# Patient Record
Sex: MALE | Race: WHITE | NOT HISPANIC OR LATINO | Employment: OTHER | URBAN - METROPOLITAN AREA
[De-identification: names, ages, dates, MRNs, and addresses within clinical notes are randomized per-mention and may not be internally consistent; named-entity substitution may affect disease eponyms.]

---

## 2020-05-20 ENCOUNTER — OFFICE VISIT (OUTPATIENT)
Dept: URGENT CARE | Facility: CLINIC | Age: 65
End: 2020-05-20
Payer: MEDICARE

## 2020-05-20 VITALS — HEART RATE: 71 BPM | OXYGEN SATURATION: 96 % | TEMPERATURE: 97 F

## 2020-05-20 DIAGNOSIS — Z11.9 ENCOUNTER FOR SCREENING EXAMINATION FOR INFECTIOUS DISEASE: Primary | ICD-10-CM

## 2020-05-20 PROCEDURE — 86769 SARS-COV-2 COVID-19 ANTIBODY: CPT

## 2020-05-20 PROCEDURE — U0003 INFECTIOUS AGENT DETECTION BY NUCLEIC ACID (DNA OR RNA); SEVERE ACUTE RESPIRATORY SYNDROME CORONAVIRUS 2 (SARS-COV-2) (CORONAVIRUS DISEASE [COVID-19]), AMPLIFIED PROBE TECHNIQUE, MAKING USE OF HIGH THROUGHPUT TECHNOLOGIES AS DESCRIBED BY CMS-2020-01-R: HCPCS

## 2020-05-20 PROCEDURE — 99201 PR OFFICE/OUTPT VISIT,NEW,LEVL I: ICD-10-PCS | Mod: S$GLB,,, | Performed by: PHYSICIAN ASSISTANT

## 2020-05-20 PROCEDURE — 99201 PR OFFICE/OUTPT VISIT,NEW,LEVL I: CPT | Mod: S$GLB,,, | Performed by: PHYSICIAN ASSISTANT

## 2020-05-20 RX ORDER — METFORMIN HYDROCHLORIDE 1000 MG/1
1000 TABLET ORAL 2 TIMES DAILY WITH MEALS
Status: ON HOLD | COMMUNITY
Start: 2020-03-09 | End: 2022-01-13 | Stop reason: HOSPADM

## 2020-05-20 RX ORDER — GLIPIZIDE 10 MG/1
TABLET ORAL
Status: ON HOLD | COMMUNITY
Start: 2020-02-29 | End: 2021-01-20 | Stop reason: HOSPADM

## 2020-05-20 RX ORDER — LOVASTATIN 20 MG/1
20 TABLET ORAL NIGHTLY
Status: ON HOLD | COMMUNITY
Start: 2020-03-09 | End: 2022-01-13 | Stop reason: HOSPADM

## 2020-05-20 RX ORDER — INSULIN GLARGINE 100 [IU]/ML
INJECTION, SOLUTION SUBCUTANEOUS
Status: ON HOLD | COMMUNITY
Start: 2020-03-09 | End: 2021-01-20 | Stop reason: SDUPTHER

## 2020-05-20 RX ORDER — LISINOPRIL 20 MG/1
20 TABLET ORAL DAILY
Status: ON HOLD | COMMUNITY
Start: 2020-03-09 | End: 2021-06-09 | Stop reason: HOSPADM

## 2020-05-20 NOTE — PROGRESS NOTES
Subjective:       Patient ID: Tej Purdy is a 64 y.o. male.    Vitals:  temperature is 97.2 °F (36.2 °C). His pulse is 71. His oxygen saturation is 96%.     Chief Complaint: Covid Swab    Patient presents in need of covid swab. Asymptomatic with no complaints    Other   Pertinent negatives include no arthralgias, chest pain, chills, congestion, coughing, fatigue, fever, headaches, joint swelling, myalgias, nausea, rash, sore throat, vertigo or vomiting.       Constitution: Negative for chills, fatigue and fever.   HENT: Negative for congestion and sore throat.    Neck: Negative for painful lymph nodes.   Cardiovascular: Negative for chest pain and leg swelling.   Eyes: Negative for double vision and blurred vision.   Respiratory: Negative for cough and shortness of breath.    Gastrointestinal: Negative for nausea, vomiting and diarrhea.   Genitourinary: Negative for dysuria, frequency and urgency.   Musculoskeletal: Negative for joint pain, joint swelling, muscle cramps and muscle ache.   Skin: Negative for color change, pale and rash.   Allergic/Immunologic: Negative for seasonal allergies.   Neurological: Negative for dizziness, history of vertigo, light-headedness, passing out and headaches.   Hematologic/Lymphatic: Negative for swollen lymph nodes, easy bruising/bleeding and history of blood clots. Does not bruise/bleed easily.   Psychiatric/Behavioral: Negative for nervous/anxious, sleep disturbance and depression. The patient is not nervous/anxious.        Objective:      Physical Exam   Constitutional: He is oriented to person, place, and time. He appears well-developed and well-nourished. No distress.   HENT:   Head: Normocephalic and atraumatic.   Right Ear: External ear normal.   Left Ear: External ear normal.   Eyes: Conjunctivae are normal.   Neck: Normal range of motion. Neck supple.   Cardiovascular: Normal rate.   Pulmonary/Chest: Effort normal. No respiratory distress.   Neurological: He is  alert and oriented to person, place, and time.   Skin: Skin is not diaphoretic.   Psychiatric: He has a normal mood and affect. His behavior is normal. Judgment and thought content normal.         Assessment:       1. Encounter for screening examination for infectious disease        Plan:       Discussed antibody test and meaning. All questions answered    Encounter for screening examination for infectious disease  -     COVID-19 Routine Screening  -     COVID-19 (SARS CoV-2) IgG Antibody    Labs reviewed, pertinent imaging reviewed, previous medical records, medical history, surgical history, social history, family history reviewed.       Patient Instructions   We will call you with results

## 2020-05-22 LAB
SARS-COV-2 IGG SERPLBLD QL IA.RAPID: NEGATIVE
SARS-COV-2 RNA RESP QL NAA+PROBE: NOT DETECTED

## 2020-05-23 ENCOUNTER — TELEPHONE (OUTPATIENT)
Dept: URGENT CARE | Facility: CLINIC | Age: 65
End: 2020-05-23

## 2020-05-23 NOTE — TELEPHONE ENCOUNTER
----- Message from Geo Navarro NP sent at 5/22/2020  4:39 PM CDT -----  Okay to call pt with result: COVID19 swab negative, follow up with pcp if still having symptoms

## 2020-11-20 ENCOUNTER — TELEPHONE (OUTPATIENT)
Dept: TRANSPLANT | Facility: CLINIC | Age: 65
End: 2020-11-20

## 2020-11-20 ENCOUNTER — TELEPHONE (OUTPATIENT)
Dept: HEPATOLOGY | Facility: CLINIC | Age: 65
End: 2020-11-20

## 2020-11-20 DIAGNOSIS — R16.0 LIVER MASS: Primary | ICD-10-CM

## 2020-11-20 NOTE — PROGRESS NOTES
Patient looking for second opinion for work-up of abdominal mass. Have placed referral to hepatology    Brief history:   Symptomatic with abdominal pain. Had ultrasound then follow up CT 10/2020 noted 3.5cm liver lesion and early cirrhosis. Saw Dr Rk Gibbs at [facility] and recommended laproscopic procedure to remove the lesion and small portion of liver. Then met with Dr Devin Burkett with [facility] surgery and reportedly recommended removal of lesion and 50% of liver (after cardiac stress testing). Now asking for second opinion prior to massive liver resection

## 2020-11-20 NOTE — TELEPHONE ENCOUNTER
"Patient: Tej Purdy       MRN: [id number]      : 1955     Age: 65 y.o.  [address] [facility] [address]    Providers  Hepatologists: Thais Maxwell MD  Surgeons: n/a  Radiologists: n/a  Advanced Practice providers:     Priority of review: Transplant related    Patient Transplant Status: Other resection offered at [facility]    Reason for presentation: Initial staging for transplant    Clinical Summary: Pt will be seen on Tuesday  65 yr old- He is Poppy Eh's "uncle-in-law". Noted to have hypervascular liver lesion with washout: 3.5 cm in segment 5/6 c/w HCC. Noted to have cirrhosis. Plt count 133. Atherosclerosis noted on ct scan. Resection offered at [facility].  Tbil 0.6, creat 1.0, albumin 4.7, Na 139, ALT 22  Imaging to be reviewed: 10/22/20 ct scan at [facility]. To be uploaded on Monday    HCC Treatment History: n/a    ABO:     Platelets: No results found for: PLT, EXTPLATELETS  Creatinine: No results found for: CREATININE, EXTCREATININ  Bilirubin: No results found for: BILITOT, EXTBILITOTAL, EXTBILIRUBIN  AFP Last 3 each if available: No results found for: AFP, EXTAFP    MELD: Computed MELD-Na score unavailable. Necessary lab results were not found in the last year.  Computed MELD score unavailable. Necessary lab results were not found in the last year.    Plan:     Follow-up Provider:   "

## 2020-11-23 PROBLEM — K76.6 PORTAL HYPERTENSION: Status: ACTIVE | Noted: 2020-11-23

## 2020-11-23 PROBLEM — N20.0 KIDNEY STONES: Status: ACTIVE | Noted: 2020-11-23

## 2020-11-23 PROBLEM — I10 HTN (HYPERTENSION): Status: ACTIVE | Noted: 2020-11-23

## 2020-11-23 PROBLEM — K74.60 CIRRHOSIS: Status: ACTIVE | Noted: 2020-11-23

## 2020-11-23 PROBLEM — E11.9 DIABETES: Status: ACTIVE | Noted: 2020-11-23

## 2020-11-23 PROBLEM — C44.90 SKIN CANCER: Status: ACTIVE | Noted: 2020-11-23

## 2020-11-23 PROBLEM — R16.0 LIVER MASS: Status: ACTIVE | Noted: 2020-11-23

## 2020-11-23 PROBLEM — K75.81 NASH (NONALCOHOLIC STEATOHEPATITIS): Status: ACTIVE | Noted: 2020-11-23

## 2020-11-23 PROBLEM — E78.5 HYPERLIPIDEMIA: Status: ACTIVE | Noted: 2020-11-23

## 2020-11-23 NOTE — PROGRESS NOTES
HEPATOLOGY CONSULTATION    Referring Physician: Jessika Carbone MD  Current Corresponding Physician: Jessika Carbone MD, Deepika Stauffer MD    Reason for Consultation: Consultation for evaluation of liver mass in the setting of cirrhosis    HPI  The patient is a 65 yr old male with DM, diabetic neuropathy, HTN, hyperlipidemia, who developed abdominal pain. To evaluate this, he underwent a CT scan 10/20 that revealed a 3.5 cm hypervascular mass in segment 5/6. This is suspicious for HCC. The CT scan also revealed cirrhosis of the liver. He has been offered resection at [facility].    He has cirrhosis, compensated presumed to be from CASTILLO. Stopped all alcohol 15 years ago- not a heavy drinker prior to that. The patient denies any symptoms of decompensated cirrhosis, including no ascites or edema, cognitive problems that would suggest hepatic encephalopathy, or GI bleeding from varices.  Plts <133  Tbil 0.6, creat 1.0, albumin 4.7, Na 139, ALT 22.    Radiology review today 11/25/20: 3.5 cm HCC. TACE and ablation suggested for locoregional therapy    Diabetes: neuropathy on gabapentin  Dental: gum disease  Skin cancer: hx of basal cell ca of the hand 20 yrs ago; no evaluation in ~3 yrs  Possible PVD: gets leg cramps with walking short distances    Nicotine addiction: still currently smokes    Social support: has identified his long-term partner, Aniya as social support for liver transplant.      Past Medical History:   Diagnosis Date    Cirrhosis 11/23/2020    Diabetes 11/23/2020    Diabetes mellitus, type 2     Disorder of kidney and ureter     HTN (hypertension) 11/23/2020    Hyperlipidemia 11/23/2020    Kidney stones 11/23/2020    S/p lithotripsy 2020    Liver mass 11/23/2020    CASTILLO (nonalcoholic steatohepatitis) 11/23/2020    Portal hypertension 11/23/2020    Skin cancer 11/23/2020     Outpatient Encounter Medications as of 11/24/2020   Medication Sig Dispense Refill    glipiZIDE (GLUCOTROL) 10 MG tablet        insulin glargine (LANTUS) 100 unit/mL injection       lisinopriL (PRINIVIL,ZESTRIL) 20 MG tablet Take 20 mg by mouth.      lovastatin (MEVACOR) 20 MG tablet Take 20 mg by mouth.      metFORMIN (GLUCOPHAGE) 1000 MG tablet Take 1,000 mg by mouth.       No facility-administered encounter medications on file as of 11/24/2020.      Review of patient's allergies indicates:   Allergen Reactions    Bee pollens Swelling     BEE STINGS swells body     No family history on file.    Social History     Socioeconomic History    Marital status: Single     Spouse name: Not on file    Number of children: Not on file    Years of education: Not on file    Highest education level: Not on file   Occupational History    Not on file   Social Needs    Financial resource strain: Not on file    Food insecurity     Worry: Not on file     Inability: Not on file    Transportation needs     Medical: Not on file     Non-medical: Not on file   Tobacco Use    Smoking status: Never Smoker    Smokeless tobacco: Never Used   Substance and Sexual Activity    Alcohol use: Not Currently    Drug use: Not on file    Sexual activity: Not on file   Lifestyle    Physical activity     Days per week: Not on file     Minutes per session: Not on file    Stress: Not on file   Relationships    Social connections     Talks on phone: Not on file     Gets together: Not on file     Attends Bahai service: Not on file     Active member of club or organization: Not on file     Attends meetings of clubs or organizations: Not on file     Relationship status: Not on file   Other Topics Concern    Not on file   Social History Narrative    Not on file     Review of Systems   Constitutional: Negative.    HENT: Negative.    Eyes: Negative.    Respiratory: Negative.    Cardiovascular: Negative.    Gastrointestinal: Negative.    Genitourinary: Negative.    Musculoskeletal: Negative.    Skin: Negative.    Neurological: Negative.    Psychiatric/Behavioral:  Negative.      Vitals:    11/24/20 0850   BP: (!) 151/66   Pulse: 94       Physical Exam  Vitals signs reviewed.   Constitutional:       Appearance: He is well-developed.   HENT:      Head: Normocephalic and atraumatic.   Eyes:      General: No scleral icterus.     Conjunctiva/sclera: Conjunctivae normal.      Pupils: Pupils are equal, round, and reactive to light.   Neck:      Musculoskeletal: Normal range of motion and neck supple.      Thyroid: No thyromegaly.   Cardiovascular:      Rate and Rhythm: Normal rate and regular rhythm.      Heart sounds: Normal heart sounds.   Pulmonary:      Effort: Pulmonary effort is normal.      Breath sounds: Normal breath sounds. No rales.   Abdominal:      General: Bowel sounds are normal. There is no distension.      Palpations: Abdomen is soft. There is no mass.      Tenderness: There is no abdominal tenderness.   Musculoskeletal: Normal range of motion.   Skin:     General: Skin is warm and dry.      Findings: No rash.   Neurological:      Mental Status: He is alert and oriented to person, place, and time.           Assessment and Plan:  Patient Active Problem List   Diagnosis    Liver mass    Diabetes    Hyperlipidemia    HTN (hypertension)    CASTILLO (nonalcoholic steatohepatitis)    Skin cancer    Cirrhosis    Portal hypertension    Kidney stones     Tej Purdy is a 65 y.o. male with an HCC in the setting of cirrhosis. Current recommendations:  1. HCC: recommend liver tx evaluation; locoregional therapy with TACE/ablation. Discussed resection. Patient has opted for liver transplant  2. Cirrhosis, compensated: check meld labs every 3 months; full serologic w/u for chronic liver disease.  3. Atherosclerosis seen on ct: needs careful assessment for CAD, including cardiology consultation at the time of evaluation.  4. Gum disease: to see a dentist; amoxicillin 1 hr prior to dental procedures  5. Hx of skin cancer, basal cell: dermatology evaluation now  6. Leg  cramping with walking: vascular evaluation during tx evaluation; to see Dr Corbin  7. Atherosclerosis seen on CT: carfeful evaluation for CAD; will do stress test Monday as scheduled at [facility]; to see cardiology  8. Nicotine addiction: patient instructed to stop smoking  9. Diabetes: check hemoglobin A1c    Return 8 weeks

## 2020-11-24 ENCOUNTER — OFFICE VISIT (OUTPATIENT)
Dept: HEPATOLOGY | Facility: CLINIC | Age: 65
End: 2020-11-24
Payer: MEDICARE

## 2020-11-24 ENCOUNTER — TELEPHONE (OUTPATIENT)
Dept: TRANSPLANT | Facility: CLINIC | Age: 65
End: 2020-11-24

## 2020-11-24 ENCOUNTER — CONFERENCE (OUTPATIENT)
Dept: TRANSPLANT | Facility: CLINIC | Age: 65
End: 2020-11-24

## 2020-11-24 VITALS
OXYGEN SATURATION: 99 % | HEART RATE: 94 BPM | SYSTOLIC BLOOD PRESSURE: 151 MMHG | DIASTOLIC BLOOD PRESSURE: 66 MMHG | WEIGHT: 212.31 LBS | HEIGHT: 72 IN | BODY MASS INDEX: 28.76 KG/M2

## 2020-11-24 DIAGNOSIS — K06.9 GUM DISEASE: Primary | ICD-10-CM

## 2020-11-24 DIAGNOSIS — K75.81 NASH (NONALCOHOLIC STEATOHEPATITIS): ICD-10-CM

## 2020-11-24 DIAGNOSIS — R16.0 LIVER MASS: ICD-10-CM

## 2020-11-24 DIAGNOSIS — K76.6 PORTAL HYPERTENSION: ICD-10-CM

## 2020-11-24 DIAGNOSIS — Z01.818 PRE-TRANSPLANT EVALUATION FOR LIVER TRANSPLANT: ICD-10-CM

## 2020-11-24 DIAGNOSIS — C44.90 SKIN CANCER: ICD-10-CM

## 2020-11-24 DIAGNOSIS — E08.610 DIABETES MELLITUS DUE TO UNDERLYING CONDITION WITH DIABETIC NEUROPATHIC ARTHROPATHY: ICD-10-CM

## 2020-11-24 DIAGNOSIS — E78.5 HYPERLIPIDEMIA, UNSPECIFIED HYPERLIPIDEMIA TYPE: ICD-10-CM

## 2020-11-24 DIAGNOSIS — N20.0 KIDNEY STONES: ICD-10-CM

## 2020-11-24 DIAGNOSIS — I10 HYPERTENSION, UNSPECIFIED TYPE: ICD-10-CM

## 2020-11-24 DIAGNOSIS — K74.60 HEPATIC CIRRHOSIS, UNSPECIFIED HEPATIC CIRRHOSIS TYPE, UNSPECIFIED WHETHER ASCITES PRESENT: ICD-10-CM

## 2020-11-24 DIAGNOSIS — C22.0 HCC (HEPATOCELLULAR CARCINOMA): ICD-10-CM

## 2020-11-24 PROBLEM — E11.40 DIABETIC NEUROPATHY: Status: ACTIVE | Noted: 2020-11-24

## 2020-11-24 PROCEDURE — 1101F PR PT FALLS ASSESS DOC 0-1 FALLS W/OUT INJ PAST YR: ICD-10-PCS | Mod: CPTII,S$GLB,, | Performed by: INTERNAL MEDICINE

## 2020-11-24 PROCEDURE — 1126F AMNT PAIN NOTED NONE PRSNT: CPT | Mod: S$GLB,,, | Performed by: INTERNAL MEDICINE

## 2020-11-24 PROCEDURE — 3288F FALL RISK ASSESSMENT DOCD: CPT | Mod: CPTII,S$GLB,, | Performed by: INTERNAL MEDICINE

## 2020-11-24 PROCEDURE — 99999 PR PBB SHADOW E&M-EST. PATIENT-LVL V: ICD-10-PCS | Mod: PBBFAC,,, | Performed by: INTERNAL MEDICINE

## 2020-11-24 PROCEDURE — 1159F MED LIST DOCD IN RCRD: CPT | Mod: S$GLB,,, | Performed by: INTERNAL MEDICINE

## 2020-11-24 PROCEDURE — 3008F BODY MASS INDEX DOCD: CPT | Mod: CPTII,S$GLB,, | Performed by: INTERNAL MEDICINE

## 2020-11-24 PROCEDURE — 1126F PR PAIN SEVERITY QUANTIFIED, NO PAIN PRESENT: ICD-10-PCS | Mod: S$GLB,,, | Performed by: INTERNAL MEDICINE

## 2020-11-24 PROCEDURE — 1101F PT FALLS ASSESS-DOCD LE1/YR: CPT | Mod: CPTII,S$GLB,, | Performed by: INTERNAL MEDICINE

## 2020-11-24 PROCEDURE — 1159F PR MEDICATION LIST DOCUMENTED IN MEDICAL RECORD: ICD-10-PCS | Mod: S$GLB,,, | Performed by: INTERNAL MEDICINE

## 2020-11-24 PROCEDURE — 99999 PR PBB SHADOW E&M-EST. PATIENT-LVL V: CPT | Mod: PBBFAC,,, | Performed by: INTERNAL MEDICINE

## 2020-11-24 PROCEDURE — 3288F PR FALLS RISK ASSESSMENT DOCUMENTED: ICD-10-PCS | Mod: CPTII,S$GLB,, | Performed by: INTERNAL MEDICINE

## 2020-11-24 PROCEDURE — 99205 OFFICE O/P NEW HI 60 MIN: CPT | Mod: S$GLB,,, | Performed by: INTERNAL MEDICINE

## 2020-11-24 PROCEDURE — 99205 PR OFFICE/OUTPT VISIT, NEW, LEVL V, 60-74 MIN: ICD-10-PCS | Mod: S$GLB,,, | Performed by: INTERNAL MEDICINE

## 2020-11-24 PROCEDURE — 3008F PR BODY MASS INDEX (BMI) DOCUMENTED: ICD-10-PCS | Mod: CPTII,S$GLB,, | Performed by: INTERNAL MEDICINE

## 2020-11-24 RX ORDER — AMOXICILLIN 500 MG/1
2000 CAPSULE ORAL
Qty: 4 CAPSULE | Refills: 2 | Status: SHIPPED | OUTPATIENT
Start: 2020-11-24 | End: 2021-01-15 | Stop reason: CLARIF

## 2020-11-24 RX ORDER — PANTOPRAZOLE SODIUM 20 MG/1
20 TABLET, DELAYED RELEASE ORAL DAILY
COMMUNITY
End: 2021-09-13 | Stop reason: SDUPTHER

## 2020-11-24 RX ORDER — GABAPENTIN 300 MG/1
300 CAPSULE ORAL 3 TIMES DAILY
COMMUNITY
End: 2021-02-17 | Stop reason: SDUPTHER

## 2020-11-24 RX ORDER — AMLODIPINE BESYLATE 5 MG/1
5 TABLET ORAL DAILY
Status: ON HOLD | COMMUNITY
End: 2021-06-09 | Stop reason: HOSPADM

## 2020-11-24 NOTE — PATIENT INSTRUCTIONS
1. Peripheral vascular disease w/u during transplant evaluation  2. Amoxicillin 1 hour prior to dental procedures  3. Liver transplant evaluation  4. IR consult for locoregional therapy  5. Dermatology evaluation  6. Proceed with stress test as scheduled  7. Magnesium gummies for constipation  Return 8 weeks    [id number]

## 2020-11-24 NOTE — TELEPHONE ENCOUNTER
Referral received from Dr Maxwell.     Patient with CASTILLO with HCC.   ICD-10:  k74.60  Referred for liver transplant for  EVALUATION.    Referral completed and forwarded to [facility].          Insurance: EPIC

## 2020-11-24 NOTE — LETTER
November 24, 2020      Deepika Stauffer MD  [address] [facility] [address]           [facility] - Hepatology  [address] [address]  Phone: [phone]  Fax: [phone]          Patient: Tej Purdy   MR Number: [id number]   YOB: 1955   Date of Visit: 11/24/2020       Dear Dr. Deepika Stauffer:    Thank you for referring Tej Purdy to me for evaluation. Attached you will find relevant portions of my assessment and plan of care.    If you have questions, please do not hesitate to call me. I look forward to following Tej Purdy along with you.    Sincerely,    Thais Maxwell MD    Enclosure  CC:  No Recipients    If you would like to receive this communication electronically, please contact [email or web address] or [phone] to request more information on [employer] access.    For providers and/or their staff who would like to refer a patient to [facility], please contact us through our one-stop-shop provider referral line, [facility], at [phone].    If you feel you have received this communication in error or would no longer like to receive these types of communications, please e-mail [email or web address]

## 2020-11-25 NOTE — TELEPHONE ENCOUNTER
"Patient: Tej Purdy       MRN: [id number]      : 1955     Age: 65 y.o.  [address] [facility] [address]    Providers  Hepatologists: Thais Maxwell MD  Surgeons: n/a  Radiologists: n/a  Advanced Practice providers:     Priority of review: Transplant related    Patient Transplant Status: Other resection offered at [facility]    Reason for presentation: Initial staging for transplant    Clinical Summary: Pt will be seen on Tuesday  65 yr old- He is Poppy Eh's "uncle-in-law". Noted to have hypervascular liver lesion with washout: 3.5 cm in segment 5/6 c/w HCC. Noted to have cirrhosis. Plt count 133. Atherosclerosis noted on ct scan. Resection offered at [facility].  Tbil 0.6, creat 1.0, albumin 4.7, Na 139, ALT 22  Imaging to be reviewed: 10/22/20 ct scan at [facility]. To be uploaded on Monday    HCC Treatment History: n/a    ABO:     Platelets: No results found for: PLT, EXTPLATELETS  Creatinine: No results found for: CREATININE, EXTCREATININ  Bilirubin: No results found for: BILITOT, EXTBILITOTAL, EXTBILIRUBIN  AFP Last 3 each if available: No results found for: AFP, EXTAFP    MELD: Computed MELD-Na score unavailable. Necessary lab results were not found in the last year.  Computed MELD score unavailable. Necessary lab results were not found in the last year.    Plan: 3.5cm mass in seg VI with arterial enhancement and PV washout as well as pseudocapsule c/w HCC.  Within [facility].  If transplant candidate, would favor TACE/MWA for bridging.       Committee Discussion: Nodular liver; cirrhotic appearance. 3.5 cm enhancing lesion with washout in segment 6, c/w HCC. No significant evidence of portal HTN. Consider MD dominguez to discuss with patient. IR consult for TACE/Ablate.     Follow-up Provider: Thais Maxwell MD  "

## 2020-12-01 ENCOUNTER — OFFICE VISIT (OUTPATIENT)
Dept: INTERVENTIONAL RADIOLOGY/VASCULAR | Facility: CLINIC | Age: 65
End: 2020-12-01
Payer: MEDICARE

## 2020-12-01 VITALS
HEART RATE: 89 BPM | WEIGHT: 212.75 LBS | BODY MASS INDEX: 28.82 KG/M2 | HEIGHT: 72 IN | SYSTOLIC BLOOD PRESSURE: 140 MMHG | DIASTOLIC BLOOD PRESSURE: 73 MMHG

## 2020-12-01 DIAGNOSIS — C22.0 HCC (HEPATOCELLULAR CARCINOMA): Primary | ICD-10-CM

## 2020-12-01 DIAGNOSIS — Z01.812 PRE-PROCEDURE LAB EXAM: ICD-10-CM

## 2020-12-01 PROCEDURE — 99204 PR OFFICE/OUTPT VISIT, NEW, LEVL IV, 45-59 MIN: ICD-10-PCS | Mod: NTX,S$GLB,, | Performed by: FAMILY MEDICINE

## 2020-12-01 PROCEDURE — 1159F PR MEDICATION LIST DOCUMENTED IN MEDICAL RECORD: ICD-10-PCS | Mod: NTX,S$GLB,, | Performed by: FAMILY MEDICINE

## 2020-12-01 PROCEDURE — 99999 PR PBB SHADOW E&M-EST. PATIENT-LVL III: ICD-10-PCS | Mod: PBBFAC,TXP,, | Performed by: FAMILY MEDICINE

## 2020-12-01 PROCEDURE — 99999 PR PBB SHADOW E&M-EST. PATIENT-LVL III: CPT | Mod: PBBFAC,TXP,, | Performed by: FAMILY MEDICINE

## 2020-12-01 PROCEDURE — 3008F PR BODY MASS INDEX (BMI) DOCUMENTED: ICD-10-PCS | Mod: CPTII,NTX,S$GLB, | Performed by: FAMILY MEDICINE

## 2020-12-01 PROCEDURE — 1159F MED LIST DOCD IN RCRD: CPT | Mod: NTX,S$GLB,, | Performed by: FAMILY MEDICINE

## 2020-12-01 PROCEDURE — 3008F BODY MASS INDEX DOCD: CPT | Mod: CPTII,NTX,S$GLB, | Performed by: FAMILY MEDICINE

## 2020-12-01 PROCEDURE — 99204 OFFICE O/P NEW MOD 45 MIN: CPT | Mod: NTX,S$GLB,, | Performed by: FAMILY MEDICINE

## 2020-12-01 RX ORDER — ASPIRIN 81 MG/1
81 TABLET ORAL DAILY
Status: ON HOLD | COMMUNITY
End: 2024-01-30 | Stop reason: HOSPADM

## 2020-12-01 RX ORDER — AMOXICILLIN 500 MG
1 CAPSULE ORAL DAILY
COMMUNITY

## 2020-12-01 NOTE — LETTER
December 1, 2020    Tej Purdy  [address] [facility]  [place] [address]     [facility] 9th Fl  [address]  [facility] [address]  Phone: [phone] PRE-PROCEDURE INSTRUCTIONS    Your procedure with Interventional Radiology is scheduled for 12/29/2020 and 12/30/2020. Please arrive by 7:00am. Please note your appointment time in [facility] will be different.    You must check-in and receive a wristband before going to your procedure. Your check-in location is Laboratory then Day of Surgery Center on second floor.    DO NOT take aspirin for 5 days before your procedure.  DO NOT take fish oil for 5 days before your procedure.    **Do not eat or drink anything between midnight and the time of your procedure. This includes gum, mints, and candy lemon drops.    **Do not smoke or drink alcoholic beverages 24 hours prior to your procedure.    **If you wear contact lenses, dentures, hearing aids, or glasses, bring a container to put them in during the procedure and give them to a family member for safekeeping.    **If you have been diagnosed with sleep apnea please bring your CPAP machine.    **If your doctor has scheduled you for an overnight stay, bring a small overnight bag with any personal items that you may need.    **Make arrangements in advance for transportation home by a responsible adult. It is not safe to drive a vehicle during the 24 hours following the procedure.    **All [facility] facilities and properties are tobacco free. Smoking is NOT allowed.    PLEASE NOTE: The procedure schedule has many variables which affect the time of your procedure. Family members should be available if your surgery time changes.    If you have any questions about these instructions call Interventional Radiology at [phone] Monday - Friday between 8:00am and 4:00pm or [phone] (ask for interventional radiology resident) for after hours.

## 2020-12-01 NOTE — PROGRESS NOTES
Subjective:       Patient ID: Tej Purdy is a 65 y.o. male.    Chief Complaint: Hepatocellular Carcinoma    Patient referred to Interventional Radiology by Dr. Maxwell to discuss treatment of hepatocellular carcinoma. He has a history of CASTILLO cirrhosis. He tells me approximately 6 months ago he began to have intense nausea that would cause him to have hot and cold flashes. He tells me this would happen every 1-2 weeks. He also has complaints of constipation. Workup revealed a 3.5 cm hypervascular mass in segment 5/6. He endorses relief from the nausea and hot and cold flashes since starting pantoprazole. He continues to have complaints of constipation. He is accompanied by his significant other. He was reviewed in liver conference.     Review of Systems   Constitutional: Positive for appetite change (decreased), chills and fatigue. Negative for activity change and fever.   HENT: Negative for nasal congestion, drooling, ear discharge, postnasal drip, sneezing and trouble swallowing.    Eyes: Negative for pain, discharge, redness and itching.        Wears glasses   Respiratory: Negative for cough, shortness of breath, wheezing and stridor.    Cardiovascular: Negative for chest pain, palpitations and leg swelling.   Gastrointestinal: Positive for abdominal distention, constipation and diarrhea. Negative for abdominal pain, nausea and vomiting.   Genitourinary: Negative for difficulty urinating, dysuria, frequency and urgency.   Musculoskeletal: Negative for arthralgias, back pain, gait problem, joint swelling, myalgias and neck pain.        Legs after walking 1 block start to hurt  Hx of neuropathy   Integumentary:  Negative for color change, pallor and rash.   Neurological: Positive for weakness (with decreased appetite). Negative for dizziness and headaches.         Objective:      Physical Exam  Vitals signs reviewed.   Constitutional:       General: He is not in acute distress.     Appearance: He is  well-developed. He is not diaphoretic.   HENT:      Head: Normocephalic and atraumatic.      Right Ear: External ear normal.      Left Ear: External ear normal.      Nose: Nose normal.      Mouth/Throat:      Pharynx: No oropharyngeal exudate.   Eyes:      General: No scleral icterus.        Right eye: No discharge.         Left eye: No discharge.      Conjunctiva/sclera: Conjunctivae normal.      Pupils: Pupils are equal, round, and reactive to light.   Neck:      Musculoskeletal: Neck supple.      Thyroid: No thyromegaly.      Vascular: No JVD.      Trachea: No tracheal deviation.   Cardiovascular:      Rate and Rhythm: Normal rate and regular rhythm.      Heart sounds: Murmur present. No friction rub. No gallop.    Pulmonary:      Effort: Pulmonary effort is normal. No respiratory distress.      Breath sounds: Normal breath sounds. No stridor. No wheezing or rales.   Abdominal:      General: Bowel sounds are normal. There is no distension.      Palpations: Abdomen is soft. There is no mass.      Tenderness: There is no abdominal tenderness. There is no guarding or rebound.   Lymphadenopathy:      Cervical: No cervical adenopathy.   Skin:     General: Skin is warm and dry.      Nails: There is no clubbing.     Neurological:      Mental Status: He is alert and oriented to person, place, and time.      Gait: Gait normal.       CT 10/22/2020       Assessment:       1. HCC (hepatocellular carcinoma)    2. Pre-procedure lab exam        Plan:         Explained to patient recommendation is to treat with a combination of both chemoembolization and microwave ablation. Chemoembolization procedure discussed in detail with the patient including risks (including, but not limited to, pain, bleeding, infection, damage to nearby structures, and the need for additional procedures), benefits, potential complications, usual pre and post procedure course, as well as potential to develop post-embolization syndrome. Utilized images  from the patient's chart, the internet, and illustrations to help explain procedure.     Discussed how the microwave abltion will be performed, risks (including, but not limited to, pain, bleeding, infection, damage to nearby structures, and the need for additional procedures), benefits, possible complications, pre-post procedure expectations, and alternatives. Explained plan would be to treat with chemoembolization then treat with microwave ablation the next day. Explained 1 month post procedure would need to obtain MRI and labs. Patient tells me he has extreme anxiety with MRI. Will obtain CT scan instead. The patient voices understanding and all questions have been answered.  The patient agrees to proceed as planned. Patient scheduled for TACE on 12/29/2020. Patient scheduled for COVID screen on 12/27/2020. Pre-procedure handout with clinic phone number provided.

## 2020-12-01 NOTE — Clinical Note
"Thank you for referring Mr. Purdy to Interventional Radiology at the [facility]. Please don't hesitate to contact us if there are any questions regarding this evaluation at [phone]. If you have any other patients for whom you would like a consultation, please place an order for "[id number]", and we will be happy to review their case.    Sincerely,  NATE Montesinos, FNP  Interventional Radiology"

## 2020-12-05 NOTE — PROGRESS NOTES
Orders entered and appointment card completed for follow-up clinic appointment with labs the week of December 7th.    Will proceed with scheduling evaluation for liver transplant, including additional consults/testing, as directed by MD

## 2020-12-07 ENCOUNTER — TELEPHONE (OUTPATIENT)
Dept: TRANSPLANT | Facility: CLINIC | Age: 65
End: 2020-12-07

## 2020-12-07 DIAGNOSIS — C44.90 SKIN CANCER: ICD-10-CM

## 2020-12-07 DIAGNOSIS — Z01.818 PRE-TRANSPLANT EVALUATION FOR LIVER TRANSPLANT: ICD-10-CM

## 2020-12-07 DIAGNOSIS — C22.0 HEPATOCELLULAR CARCINOMA: ICD-10-CM

## 2020-12-07 DIAGNOSIS — K76.6 PORTAL HYPERTENSION: Primary | ICD-10-CM

## 2020-12-07 DIAGNOSIS — Z12.5 ENCOUNTER FOR SCREENING FOR MALIGNANT NEOPLASM OF PROSTATE: ICD-10-CM

## 2020-12-07 DIAGNOSIS — E11.40 TYPE 2 DIABETES MELLITUS WITH DIABETIC NEUROPATHY, UNSPECIFIED WHETHER LONG TERM INSULIN USE: ICD-10-CM

## 2020-12-07 DIAGNOSIS — K74.60 HEPATIC CIRRHOSIS, UNSPECIFIED HEPATIC CIRRHOSIS TYPE, UNSPECIFIED WHETHER ASCITES PRESENT: ICD-10-CM

## 2020-12-07 DIAGNOSIS — K75.81 NASH (NONALCOHOLIC STEATOHEPATITIS): ICD-10-CM

## 2020-12-08 NOTE — PROGRESS NOTES
HEPATOLOGY FOLLOW UP    Referring Physician: Jessika Carbone MD  Current Corresponding Physician: Jessika Carbone MD    Tej Purdy is here for follow up of HCC    HPI  Since Tej NARCISO Gurwinder's last visit I was able to review his films in radiology conference: 3.5 cm hypervascular mass in segment 5/6. Combination of TACE and RFA was recommended and is scheduled for the end of the month. At the same time a liver tx evaluation was recommended. His insurance has been authorized for liver transplant evaluation.    He continues to deny any symptoms of decompensated cirrhosis, including no ascites or edema, cognitive problems that would suggest hepatic encephalopathy, or GI bleeding from varices.    Stress test at [facility]: negative for ischemia  2D echo reveals moderate aortic stenosis    He saw the dermatologist today: BCC to R hand, excised over 20 years ago; L groin lesion- underwent shave biopsy today    Outpatient Encounter Medications as of 12/9/2020   Medication Sig Dispense Refill    amLODIPine (NORVASC) 5 MG tablet Take 5 mg by mouth once daily. Take 5 mg by mouth once daily.      amoxicillin (AMOXIL) 500 MG capsule Take 4 capsules (2,000 mg total) by mouth On call Procedure (take one hour before the dental procedure). 4 capsule 2    aspirin (ECOTRIN) 81 MG EC tablet Take 81 mg by mouth once daily.      gabapentin (NEURONTIN) 300 MG capsule Take 300 mg by mouth 3 (three) times daily.      glipiZIDE (GLUCOTROL) 10 MG tablet       insulin glargine (LANTUS) 100 unit/mL injection       lisinopriL (PRINIVIL,ZESTRIL) 20 MG tablet Take 20 mg by mouth.      lovastatin (MEVACOR) 20 MG tablet Take 20 mg by mouth.      metFORMIN (GLUCOPHAGE) 1000 MG tablet Take 1,000 mg by mouth.      omega-3 fatty acids/fish oil (FISH OIL-OMEGA-3 FATTY ACIDS) 300-1,000 mg capsule Take by mouth once daily.      pantoprazole (PROTONIX) 20 MG tablet Take 20 mg by mouth once daily.       No facility-administered encounter medications  on file as of 12/9/2020.      Review of patient's allergies indicates:   Allergen Reactions    Bee pollens Swelling     BEE STINGS swells body     Past Medical History:   Diagnosis Date    Cirrhosis 11/23/2020    Diabetes mellitus, type 2     Diabetic neuropathy 11/24/2020    Disorder of kidney and ureter     HTN (hypertension) 11/23/2020    Hyperlipidemia 11/23/2020    Kidney stones 11/23/2020    S/p lithotripsy 2020    Liver mass 11/23/2020    CASTILLO (nonalcoholic steatohepatitis) 11/23/2020    Portal hypertension 11/23/2020    Skin cancer 11/23/2020       Review of Systems   Constitutional: Negative.    HENT: Negative.    Eyes: Negative.    Respiratory: Negative.    Cardiovascular: Negative.    Gastrointestinal: Negative.    Genitourinary: Negative.    Musculoskeletal: Negative.    Skin: Negative.    Neurological: Negative.    Psychiatric/Behavioral: Negative.      Vitals:    12/09/20 1437   BP: (!) 119/56   Pulse: 82       Physical Exam  Vitals signs reviewed.   Constitutional:       Appearance: He is well-developed.   HENT:      Head: Normocephalic and atraumatic.   Eyes:      General: No scleral icterus.     Conjunctiva/sclera: Conjunctivae normal.      Pupils: Pupils are equal, round, and reactive to light.   Neck:      Musculoskeletal: Normal range of motion and neck supple.      Thyroid: No thyromegaly.   Cardiovascular:      Rate and Rhythm: Normal rate and regular rhythm.      Heart sounds: Normal heart sounds.   Pulmonary:      Effort: Pulmonary effort is normal.      Breath sounds: Normal breath sounds. No rales.   Abdominal:      General: Bowel sounds are normal. There is no distension.      Palpations: Abdomen is soft. There is no mass.      Tenderness: There is no abdominal tenderness.   Musculoskeletal: Normal range of motion.   Skin:     General: Skin is warm and dry.      Findings: No rash.   Neurological:      Mental Status: He is alert and oriented to person, place, and time.          MELD-Na score: 6 at 12/9/2020  9:05 AM  MELD score: 6 at 12/9/2020  9:05 AM  Calculated from:  Serum Creatinine: 0.8 mg/dL (Rounded to 1 mg/dL) at 12/9/2020  9:05 AM  Serum Sodium: 139 mmol/L (Rounded to 137 mmol/L) at 12/9/2020  9:05 AM  Total Bilirubin: 0.6 mg/dL (Rounded to 1 mg/dL) at 12/9/2020  9:05 AM  INR(ratio): 1.0 at 12/9/2020  9:05 AM  Age: 65 years 1 month    No results found for: GLU, BUN, CREATININE, CALCIUM, NA, K, CL, PROT, CO2, ANIONGAP, WBC, RBC, HGB, HCT, MCV, MCH, MCHC  No results found for: RDW, PLT, MPV, GRAN, LYMPH, MONO, EOSINOPHIL, BASOPHIL, EOS, BASO, APTT, GROUPTRH, EZEKIEL, BNP, CHOL, TRIG, HDL, CHOLHDL, TOTALCHOLEST, ALBUMIN, BILIDIR, AST, ALT, ALKPHOS, MG, LABPROT, INR, PSA, QUANTIFERON    Assessment and Plan:  Patient Active Problem List   Diagnosis    Liver mass    Diabetes    Hyperlipidemia    HTN (hypertension)    CASTILLO (nonalcoholic steatohepatitis)    Skin cancer    Cirrhosis    Portal hypertension    Kidney stone    Diabetic neuropathy     Tej Purdy is a 65 y.o. male with compensated cirrhosis and HCC. Current recommendations:  1. HCC: TACE and RFA; complete liver tx evaluation.  2. Compensated cirrhosis: full serologic w/u; monitor meld labs monthly after TACE and RFA x 3 months  3. Atherosclerosis seen on ct: needs careful assessment for CAD, including cardiology consultation at the time of evaluation; negative stress test reassuting  4. Aortic stenosis, moderate- 2D echo now and to see Dr Shelton of cardiology  5. Gum disease: to see a dentist; amoxicillin 1 hr prior to dental procedures  6. Hx of skin cancer, basal cell-remote-cleared for tx; await shave biopsy of left inguinal lesion.  7. Leg cramping with walking: vascular evaluation during tx evaluation; to see Dr Corbni  8. THC addiction: patient instructed to stop smoking THC  9. Diabetes: check hemoglobin A1c- consider coming off metformin  10. Depression- situational given new dx of HCC and need  for liver tx- referral to oncology psychology at [facility] (tx coordinator to arrange)    Return 2 weeks after IR procedures

## 2020-12-08 NOTE — TELEPHONE ENCOUNTER
Spoke with Marcus Persauddelfina in reference to coordinating dates for liver transplant evaluation.  Standard and Fast Pass evaluation procedures reviewed, as well as tentative start dates.  Patient expressed understanding and agreed to Fast Pass evaluation Jan 14th and 15th.   Tentative evaluation schedule reviewed. Patient states recent c-scope done at [facility]; will request results. Denies having prior egd and request to schedule at [facility]. Advised of the need to fast after midnight Jan 13th and need to have caregiver attend.  All questions and concerns addressed. Caregiver and Fast Pass appointment letters mailed.

## 2020-12-09 ENCOUNTER — OFFICE VISIT (OUTPATIENT)
Dept: TRANSPLANT | Facility: CLINIC | Age: 65
End: 2020-12-09
Payer: MEDICARE

## 2020-12-09 ENCOUNTER — LAB VISIT (OUTPATIENT)
Dept: LAB | Facility: HOSPITAL | Age: 65
End: 2020-12-09
Attending: INTERNAL MEDICINE
Payer: MEDICARE

## 2020-12-09 ENCOUNTER — OFFICE VISIT (OUTPATIENT)
Dept: DERMATOLOGY | Facility: CLINIC | Age: 65
End: 2020-12-09
Payer: MEDICARE

## 2020-12-09 VITALS
OXYGEN SATURATION: 97 % | HEART RATE: 82 BPM | SYSTOLIC BLOOD PRESSURE: 119 MMHG | DIASTOLIC BLOOD PRESSURE: 56 MMHG | BODY MASS INDEX: 28.7 KG/M2 | WEIGHT: 211.88 LBS | HEIGHT: 72 IN

## 2020-12-09 DIAGNOSIS — N20.0 KIDNEY STONES: ICD-10-CM

## 2020-12-09 DIAGNOSIS — K75.81 NASH (NONALCOHOLIC STEATOHEPATITIS): ICD-10-CM

## 2020-12-09 DIAGNOSIS — E11.40 TYPE 2 DIABETES MELLITUS WITH DIABETIC NEUROPATHY, UNSPECIFIED WHETHER LONG TERM INSULIN USE: ICD-10-CM

## 2020-12-09 DIAGNOSIS — L82.1 SEBORRHEIC KERATOSES: ICD-10-CM

## 2020-12-09 DIAGNOSIS — K06.9 GUM DISEASE: ICD-10-CM

## 2020-12-09 DIAGNOSIS — Z01.818 PRE-TRANSPLANT EVALUATION FOR LIVER TRANSPLANT: ICD-10-CM

## 2020-12-09 DIAGNOSIS — K74.60 HEPATIC CIRRHOSIS, UNSPECIFIED HEPATIC CIRRHOSIS TYPE, UNSPECIFIED WHETHER ASCITES PRESENT: Primary | ICD-10-CM

## 2020-12-09 DIAGNOSIS — K74.60 HEPATIC CIRRHOSIS, UNSPECIFIED HEPATIC CIRRHOSIS TYPE, UNSPECIFIED WHETHER ASCITES PRESENT: ICD-10-CM

## 2020-12-09 DIAGNOSIS — I10 HYPERTENSION, UNSPECIFIED TYPE: ICD-10-CM

## 2020-12-09 DIAGNOSIS — E08.610 DIABETES MELLITUS DUE TO UNDERLYING CONDITION WITH DIABETIC NEUROPATHIC ARTHROPATHY: ICD-10-CM

## 2020-12-09 DIAGNOSIS — K76.6 PORTAL HYPERTENSION: ICD-10-CM

## 2020-12-09 DIAGNOSIS — E78.5 HYPERLIPIDEMIA, UNSPECIFIED HYPERLIPIDEMIA TYPE: ICD-10-CM

## 2020-12-09 DIAGNOSIS — C22.0 HCC (HEPATOCELLULAR CARCINOMA): ICD-10-CM

## 2020-12-09 DIAGNOSIS — C44.90 SKIN CANCER: ICD-10-CM

## 2020-12-09 DIAGNOSIS — C22.0 HEPATOCELLULAR CARCINOMA: ICD-10-CM

## 2020-12-09 DIAGNOSIS — R16.0 LIVER MASS: ICD-10-CM

## 2020-12-09 DIAGNOSIS — E13.65 OTHER SPECIFIED DIABETES MELLITUS WITH HYPERGLYCEMIA: ICD-10-CM

## 2020-12-09 DIAGNOSIS — Z12.83 SCREENING EXAM FOR SKIN CANCER: ICD-10-CM

## 2020-12-09 DIAGNOSIS — D48.9 NEOPLASM, UNCERTAIN WHETHER BENIGN OR MALIGNANT: Primary | ICD-10-CM

## 2020-12-09 DIAGNOSIS — B35.6 TINEA CRURIS: ICD-10-CM

## 2020-12-09 LAB
AFP SERPL-MCNC: 2.8 NG/ML (ref 0–8.4)
AFP SERPL-MCNC: 2.8 NG/ML (ref 0–8.4)
ALBUMIN SERPL BCP-MCNC: 4.4 G/DL (ref 3.5–5.2)
ALP SERPL-CCNC: 74 U/L (ref 55–135)
ALT SERPL W/O P-5'-P-CCNC: 34 U/L (ref 10–44)
ANION GAP SERPL CALC-SCNC: 12 MMOL/L (ref 8–16)
AST SERPL-CCNC: 29 U/L (ref 10–40)
BASOPHILS # BLD AUTO: 0.07 K/UL (ref 0–0.2)
BASOPHILS NFR BLD: 0.6 % (ref 0–1.9)
BILIRUB SERPL-MCNC: 0.6 MG/DL (ref 0.1–1)
BUN SERPL-MCNC: 24 MG/DL (ref 8–23)
CALCIUM SERPL-MCNC: 9.9 MG/DL (ref 8.7–10.5)
CHLORIDE SERPL-SCNC: 103 MMOL/L (ref 95–110)
CO2 SERPL-SCNC: 24 MMOL/L (ref 23–29)
CREAT SERPL-MCNC: 0.8 MG/DL (ref 0.5–1.4)
DIFFERENTIAL METHOD: ABNORMAL
EOSINOPHIL # BLD AUTO: 0.2 K/UL (ref 0–0.5)
EOSINOPHIL NFR BLD: 1.5 % (ref 0–8)
ERYTHROCYTE [DISTWIDTH] IN BLOOD BY AUTOMATED COUNT: 13.2 % (ref 11.5–14.5)
EST. GFR  (AFRICAN AMERICAN): >60 ML/MIN/1.73 M^2
EST. GFR  (NON AFRICAN AMERICAN): >60 ML/MIN/1.73 M^2
ESTIMATED AVG GLUCOSE: 117 MG/DL (ref 68–131)
GLUCOSE SERPL-MCNC: 113 MG/DL (ref 70–110)
HBA1C MFR BLD HPLC: 5.7 % (ref 4–5.6)
HCT VFR BLD AUTO: 49.9 % (ref 40–54)
HGB BLD-MCNC: 16.2 G/DL (ref 14–18)
IMM GRANULOCYTES # BLD AUTO: 0.1 K/UL (ref 0–0.04)
IMM GRANULOCYTES NFR BLD AUTO: 0.8 % (ref 0–0.5)
INR PPP: 1 (ref 0.8–1.2)
LYMPHOCYTES # BLD AUTO: 1.9 K/UL (ref 1–4.8)
LYMPHOCYTES NFR BLD: 16.3 % (ref 18–48)
MCH RBC QN AUTO: 29.8 PG (ref 27–31)
MCHC RBC AUTO-ENTMCNC: 32.5 G/DL (ref 32–36)
MCV RBC AUTO: 92 FL (ref 82–98)
MONOCYTES # BLD AUTO: 1 K/UL (ref 0.3–1)
MONOCYTES NFR BLD: 8.1 % (ref 4–15)
NEUTROPHILS # BLD AUTO: 8.7 K/UL (ref 1.8–7.7)
NEUTROPHILS NFR BLD: 72.7 % (ref 38–73)
NRBC BLD-RTO: 0 /100 WBC
PLATELET # BLD AUTO: 161 K/UL (ref 150–350)
PMV BLD AUTO: 11.6 FL (ref 9.2–12.9)
POTASSIUM SERPL-SCNC: 4.1 MMOL/L (ref 3.5–5.1)
PROT SERPL-MCNC: 7.9 G/DL (ref 6–8.4)
PROTHROMBIN TIME: 11 SEC (ref 9–12.5)
RBC # BLD AUTO: 5.44 M/UL (ref 4.6–6.2)
SODIUM SERPL-SCNC: 139 MMOL/L (ref 136–145)
WBC # BLD AUTO: 11.91 K/UL (ref 3.9–12.7)

## 2020-12-09 PROCEDURE — 88305 TISSUE EXAM BY PATHOLOGIST: CPT | Mod: 26,NTX,, | Performed by: PATHOLOGY

## 2020-12-09 PROCEDURE — 3008F BODY MASS INDEX DOCD: CPT | Mod: CPTII,S$GLB,TXP, | Performed by: INTERNAL MEDICINE

## 2020-12-09 PROCEDURE — 99203 PR OFFICE/OUTPT VISIT, NEW, LEVL III, 30-44 MIN: ICD-10-PCS | Mod: 25,S$GLB,, | Performed by: DERMATOLOGY

## 2020-12-09 PROCEDURE — 3288F PR FALLS RISK ASSESSMENT DOCUMENTED: ICD-10-PCS | Mod: CPTII,S$GLB,TXP, | Performed by: INTERNAL MEDICINE

## 2020-12-09 PROCEDURE — 1159F MED LIST DOCD IN RCRD: CPT | Mod: S$GLB,TXP,, | Performed by: INTERNAL MEDICINE

## 2020-12-09 PROCEDURE — 99999 PR PBB SHADOW E&M-EST. PATIENT-LVL II: ICD-10-PCS | Mod: PBBFAC,,,

## 2020-12-09 PROCEDURE — 99999 PR PBB SHADOW E&M-EST. PATIENT-LVL IV: ICD-10-PCS | Mod: PBBFAC,TXP,, | Performed by: INTERNAL MEDICINE

## 2020-12-09 PROCEDURE — 99215 OFFICE O/P EST HI 40 MIN: CPT | Mod: S$GLB,TXP,, | Performed by: INTERNAL MEDICINE

## 2020-12-09 PROCEDURE — 88305 TISSUE EXAM BY PATHOLOGIST: ICD-10-PCS | Mod: 26,NTX,, | Performed by: PATHOLOGY

## 2020-12-09 PROCEDURE — 1125F AMNT PAIN NOTED PAIN PRSNT: CPT | Mod: S$GLB,TXP,, | Performed by: INTERNAL MEDICINE

## 2020-12-09 PROCEDURE — 3288F FALL RISK ASSESSMENT DOCD: CPT | Mod: CPTII,S$GLB,TXP, | Performed by: INTERNAL MEDICINE

## 2020-12-09 PROCEDURE — 83036 HEMOGLOBIN GLYCOSYLATED A1C: CPT | Mod: NTX

## 2020-12-09 PROCEDURE — 3044F HG A1C LEVEL LT 7.0%: CPT | Mod: CPTII,S$GLB,TXP, | Performed by: INTERNAL MEDICINE

## 2020-12-09 PROCEDURE — 99215 PR OFFICE/OUTPT VISIT, EST, LEVL V, 40-54 MIN: ICD-10-PCS | Mod: S$GLB,TXP,, | Performed by: INTERNAL MEDICINE

## 2020-12-09 PROCEDURE — 11102 TANGNTL BX SKIN SINGLE LES: CPT | Mod: GC,S$GLB,, | Performed by: STUDENT IN AN ORGANIZED HEALTH CARE EDUCATION/TRAINING PROGRAM

## 2020-12-09 PROCEDURE — 36415 COLL VENOUS BLD VENIPUNCTURE: CPT | Mod: NTX

## 2020-12-09 PROCEDURE — 99203 OFFICE O/P NEW LOW 30 MIN: CPT | Mod: 25,S$GLB,, | Performed by: DERMATOLOGY

## 2020-12-09 PROCEDURE — 1101F PT FALLS ASSESS-DOCD LE1/YR: CPT | Mod: CPTII,S$GLB,TXP, | Performed by: INTERNAL MEDICINE

## 2020-12-09 PROCEDURE — 85610 PROTHROMBIN TIME: CPT | Mod: TXP

## 2020-12-09 PROCEDURE — 1159F PR MEDICATION LIST DOCUMENTED IN MEDICAL RECORD: ICD-10-PCS | Mod: S$GLB,,, | Performed by: DERMATOLOGY

## 2020-12-09 PROCEDURE — 80321 ALCOHOLS BIOMARKERS 1OR 2: CPT | Mod: NTX

## 2020-12-09 PROCEDURE — 1101F PR PT FALLS ASSESS DOC 0-1 FALLS W/OUT INJ PAST YR: ICD-10-PCS | Mod: CPTII,S$GLB,TXP, | Performed by: INTERNAL MEDICINE

## 2020-12-09 PROCEDURE — 99999 PR PBB SHADOW E&M-EST. PATIENT-LVL II: CPT | Mod: PBBFAC,,,

## 2020-12-09 PROCEDURE — 80053 COMPREHEN METABOLIC PANEL: CPT | Mod: TXP

## 2020-12-09 PROCEDURE — 1159F MED LIST DOCD IN RCRD: CPT | Mod: S$GLB,,, | Performed by: DERMATOLOGY

## 2020-12-09 PROCEDURE — 82105 ALPHA-FETOPROTEIN SERUM: CPT | Mod: TXP

## 2020-12-09 PROCEDURE — 3044F PR MOST RECENT HEMOGLOBIN A1C LEVEL <7.0%: ICD-10-PCS | Mod: CPTII,S$GLB,TXP, | Performed by: INTERNAL MEDICINE

## 2020-12-09 PROCEDURE — 3008F PR BODY MASS INDEX (BMI) DOCUMENTED: ICD-10-PCS | Mod: CPTII,S$GLB,TXP, | Performed by: INTERNAL MEDICINE

## 2020-12-09 PROCEDURE — 1159F PR MEDICATION LIST DOCUMENTED IN MEDICAL RECORD: ICD-10-PCS | Mod: S$GLB,TXP,, | Performed by: INTERNAL MEDICINE

## 2020-12-09 PROCEDURE — 99999 PR PBB SHADOW E&M-EST. PATIENT-LVL IV: CPT | Mod: PBBFAC,TXP,, | Performed by: INTERNAL MEDICINE

## 2020-12-09 PROCEDURE — 11102 PR TANGENTIAL BIOPSY, SKIN, SINGLE LESION: ICD-10-PCS | Mod: GC,S$GLB,, | Performed by: STUDENT IN AN ORGANIZED HEALTH CARE EDUCATION/TRAINING PROGRAM

## 2020-12-09 PROCEDURE — 1125F PR PAIN SEVERITY QUANTIFIED, PAIN PRESENT: ICD-10-PCS | Mod: S$GLB,TXP,, | Performed by: INTERNAL MEDICINE

## 2020-12-09 PROCEDURE — 85025 COMPLETE CBC W/AUTO DIFF WBC: CPT | Mod: TXP

## 2020-12-09 PROCEDURE — 88305 TISSUE EXAM BY PATHOLOGIST: CPT | Mod: NTX | Performed by: PATHOLOGY

## 2020-12-09 RX ORDER — SERTRALINE HYDROCHLORIDE 25 MG/1
25 TABLET, FILM COATED ORAL DAILY
Status: ON HOLD | COMMUNITY
Start: 2020-12-01 | End: 2022-01-13 | Stop reason: SDUPTHER

## 2020-12-09 RX ORDER — KETOCONAZOLE 20 MG/G
CREAM TOPICAL
Qty: 60 G | Refills: 6 | Status: ON HOLD | OUTPATIENT
Start: 2020-12-09 | End: 2021-01-20 | Stop reason: HOSPADM

## 2020-12-09 NOTE — PROGRESS NOTES
Subjective:       Patient ID:  Tej Purdy is a 65 y.o. male who presents for   Chief Complaint   Patient presents with    Skin Check     HPI  64 y/o M with h/o hepatocellular carcinoma and NMSC presenting for FBSE. Pt his a h/o BCC to R hand, excised over 20 years ago. Pt reports he has seen a dermatologist in the last year but has not had a FBSE since his skin cancer was diagnosed. Today he reports a lesion on his left groin that has been present for several years. Has gotten darker and enlarged. No other growing, changing, or nonhealing lesions. Noncompliant with daily SPF or sun protective clothing.     Review of Systems   Constitutional: Negative.    HENT: Negative.    Respiratory: Negative.    Gastrointestinal: Negative.    Skin: Negative for itching, rash, daily sunscreen use, activity-related sunscreen use and wears hat.   Allergic/Immunologic: Negative.         Objective:    Physical Exam   Constitutional: He appears well-developed and well-nourished.   Neurological: He is alert and oriented to person, place, and time.   Skin:   Areas Examined (abnormalities noted in diagram):   Scalp / Hair Palpated and Inspected  Head / Face Inspection Performed  Neck Inspection Performed  Chest / Axilla Inspection Performed  Abdomen Inspection Performed  Genitals / Buttocks / Groin Inspection Performed  Back Inspection Performed  RUE Inspected  LUE Inspection Performed  RLE Inspected  LLE Inspection Performed  Nails and Digits Inspection Performed              Diagram Legend     Erythematous scaling macule/papule c/w actinic keratosis       Vascular papule c/w angioma      Pigmented verrucoid papule/plaque c/w seborrheic keratosis      Yellow umbilicated papule c/w sebaceous hyperplasia      Irregularly shaped tan macule c/w lentigo     1-2 mm smooth white papules consistent with Milia      Movable subcutaneous cyst with punctum c/w epidermal inclusion cyst      Subcutaneous movable cyst c/w pilar cyst      Firm  pink to brown papule c/w dermatofibroma      Pedunculated fleshy papule(s) c/w skin tag(s)      Evenly pigmented macule c/w junctional nevus     Mildly variegated pigmented, slightly irregular-bordered macule c/w mildly atypical nevus      Flesh colored to evenly pigmented papule c/w intradermal nevus       Pink pearly papule/plaque c/w basal cell carcinoma      Erythematous hyperkeratotic cursted plaque c/w SCC      Surgical scar with no sign of skin cancer recurrence      Open and closed comedones      Inflammatory papules and pustules      Verrucoid papule consistent consistent with wart     Erythematous eczematous patches and plaques     Dystrophic onycholytic nail with subungual debris c/w onychomycosis     Umbilicated papule    Erythematous-base heme-crusted tan verrucoid plaque consistent with inflamed seborrheic keratosis     Erythematous Silvery Scaling Plaque c/w Psoriasis     See annotation          Assessment / Plan:      Pathology Orders:     Normal Orders This Visit    Specimen to Pathology, Dermatology     Comments:    Number of Specimens:->1  ------------------------->-------------------------  Spec 1 Procedure:->Biopsy  Spec 1 Clinical Impression:->ISK vs MM vs other  Spec 1 Source:->L inguinal fold    Questions:    Procedure Type: Dermatology and skin neoplasms    Number of Specimens: 1    ------------------------: -------------------------    Spec 1 Procedure: Biopsy    Spec 1 Clinical Impression: ISK vs MM vs other    Spec 1 Source: L inguinal fold    Clinical Information: 64 y/o M with h/o dark brown plaque to L groin, gradually enlarging and changing in color        Neoplasm uncertain behavior, left inguinal fold  -ISK vs MN vs MM  -after verbal consent obtained, shave biopsy performed  -wound care discussed with patient   Shave biopsy procedure note:    Shave biopsy performed after verbal consent including risk of infection, scar, recurrence, need for additional treatment of site. Area prepped  with alcohol, anesthetized with approximately 1.0cc of 1% lidocaine with epinephrine. Lesional tissue shaved with razor blade. Hemostasis achieved with application of aluminum chloride. No complications. Dressing applied. Wound care explained.    SK, scalp and R inguinal fold  -provided reassurance  If right scalp SKs    History of NMSC   -counseled on sun protective measures  -continue yearly FBSE   -RTC sooner if concerning lesions develop    Tinea cruris  Ketoconazole cream bid x 4 weeks    HCC -  Pre transplant  Pt will require yearly skin exams         Follow up in about 1 year (around 12/9/2021).

## 2020-12-09 NOTE — PATIENT INSTRUCTIONS
Shave Biopsy Wound Care    Your doctor has performed a shave biopsy today.  A band aid and vaseline ointment has been placed over the site.  This should remain in place for 24 hours.  It is recommended that you keep the area dry for the first 24 hours.  After 24 hours, you may remove the band aid and wash the area with warm soap and water and apply Vaseline jelly.  Many patients prefer to use Neosporin or Bacitracin ointment.  This is acceptable; however, know that you can develop an allergy to this medication even if you have used it safely for years.  It is important to keep the area moist.  Letting it dry out and get air slows healing time, and will worsen the scar.  Band aid is optional after first 24 hours.      If you notice increasing redness, tenderness, pain, or yellow drainage at the biopsy site, please notify your doctor.  These are signs of an infection.    If your biopsy site is bleeding, apply firm pressure for 15 minutes straight.  Repeat for another 15 minutes, if it is still bleeding.   If the surgical site continues to bleed, then please contact your doctor.      For [facility] users:   You will receive your biopsy results in [facility] as soon as they are available. Please be assured that your physician/provider will review your results and will then determine what further treatment, evaluation, or planning is required. You should be contacted by your physician's/provider's office within 5 business days of receiving your results; If not, please reach out to directly. This is one more way [facility] is putting you first.     [address] [facility] [place] [address]/ [phone] FAX/ [email or web address]

## 2020-12-09 NOTE — PATIENT INSTRUCTIONS
1. Moderate aortic stenosis- repeat 2D echo and to see Dr Shelton  2.liver transplant evaluation  3. Referral to oncology psychologist at [facility]    Return 2 weeks after procedures

## 2020-12-09 NOTE — LETTER
December 9, 2020        Thais Maxwell  [address]  [facility] [address]  Phone: [phone]  Fax: [phone]             [facility]  [address]""[address] [address] [address]  Phone: [phone]  Fax: [phone]   Patient: Tej Purdy   MR Number: [id number]   YOB: 1955   Date of Visit: 12/9/2020       Dear Dr. Thais Maxwell    Thank you for referring Tej Purdy to me for evaluation. Attached you will find relevant portions of my assessment and plan of care.    If you have questions, please do not hesitate to call me. I look forward to following Tej Purdy along with you.    Sincerely,    Thais Maxwell MD    Enclosure    If you would like to receive this communication electronically, please contact [email or web address] or [phone] to request [employer] access.    [employer] is a tool which provides read-only access to select patient information with whom you have a relationship. Its easy to use and provides real time access to review your patients record including encounter summaries, notes, results, and demographic information.    If you feel you have received this communication in error or would no longer like to receive these types of communications, please e-mail [email or web address]

## 2020-12-09 NOTE — Clinical Note
1. Moderate aortic stenosis- repeat 2D echo and to see Dr Shelton  2.liver transplant evaluation  3. Referral to oncology psychologist at [facility]    Return 2 weeks after procedures

## 2020-12-11 DIAGNOSIS — I73.9 PAD (PERIPHERAL ARTERY DISEASE): Primary | ICD-10-CM

## 2020-12-11 LAB
FINAL PATHOLOGIC DIAGNOSIS: NORMAL
GROSS: NORMAL
MICROSCOPIC EXAM: NORMAL

## 2020-12-14 ENCOUNTER — TELEPHONE (OUTPATIENT)
Dept: DERMATOLOGY | Facility: CLINIC | Age: 65
End: 2020-12-14

## 2020-12-14 NOTE — TELEPHONE ENCOUNTER
Called patient with biopsy results below. Patient understands benign nature of lesion. No further treatment needed.    Skin, left inguinal fold, shave biopsy:   -SEBORRHEIC KERATOSIS, PIGMENTED

## 2020-12-16 LAB — PHOSPHATIDYLETHANOL (PETH): NEGATIVE NG/ML

## 2020-12-17 ENCOUNTER — INITIAL CONSULT (OUTPATIENT)
Dept: VASCULAR SURGERY | Facility: CLINIC | Age: 65
End: 2020-12-17
Attending: SURGERY
Payer: MEDICARE

## 2020-12-17 ENCOUNTER — HOSPITAL ENCOUNTER (OUTPATIENT)
Dept: VASCULAR SURGERY | Facility: CLINIC | Age: 65
Discharge: HOME | End: 2020-12-17
Attending: SURGERY
Payer: MEDICARE

## 2020-12-17 VITALS
SYSTOLIC BLOOD PRESSURE: 133 MMHG | DIASTOLIC BLOOD PRESSURE: 61 MMHG | BODY MASS INDEX: 29.56 KG/M2 | HEIGHT: 72 IN | HEART RATE: 65 BPM | WEIGHT: 218.25 LBS | TEMPERATURE: 99 F

## 2020-12-17 DIAGNOSIS — I73.9 PAD (PERIPHERAL ARTERY DISEASE): ICD-10-CM

## 2020-12-17 DIAGNOSIS — C22.0 HEPATOCELLULAR CARCINOMA: ICD-10-CM

## 2020-12-17 DIAGNOSIS — K75.81 NASH (NONALCOHOLIC STEATOHEPATITIS): ICD-10-CM

## 2020-12-17 DIAGNOSIS — K76.6 PORTAL HYPERTENSION: ICD-10-CM

## 2020-12-17 DIAGNOSIS — E11.40 TYPE 2 DIABETES MELLITUS WITH DIABETIC NEUROPATHY, UNSPECIFIED WHETHER LONG TERM INSULIN USE: ICD-10-CM

## 2020-12-17 DIAGNOSIS — I73.9 CLAUDICATION: ICD-10-CM

## 2020-12-17 DIAGNOSIS — I73.9 PAD (PERIPHERAL ARTERY DISEASE): Primary | ICD-10-CM

## 2020-12-17 DIAGNOSIS — K74.60 HEPATIC CIRRHOSIS, UNSPECIFIED HEPATIC CIRRHOSIS TYPE, UNSPECIFIED WHETHER ASCITES PRESENT: ICD-10-CM

## 2020-12-17 DIAGNOSIS — Z01.818 PRE-TRANSPLANT EVALUATION FOR LIVER TRANSPLANT: ICD-10-CM

## 2020-12-17 DIAGNOSIS — C44.90 SKIN CANCER: ICD-10-CM

## 2020-12-17 PROCEDURE — 93923 UPR/LXTR ART STDY 3+ LVLS: CPT | Mod: S$GLB,TXP,, | Performed by: SURGERY

## 2020-12-17 PROCEDURE — 99999 PR PBB SHADOW E&M-EST. PATIENT-LVL IV: CPT | Mod: PBBFAC,TXP,, | Performed by: SURGERY

## 2020-12-17 PROCEDURE — 3044F PR MOST RECENT HEMOGLOBIN A1C LEVEL <7.0%: ICD-10-PCS | Mod: CPTII,S$GLB,TXP, | Performed by: SURGERY

## 2020-12-17 PROCEDURE — 99204 PR OFFICE/OUTPT VISIT, NEW, LEVL IV, 45-59 MIN: ICD-10-PCS | Mod: S$GLB,TXP,, | Performed by: SURGERY

## 2020-12-17 PROCEDURE — 3044F HG A1C LEVEL LT 7.0%: CPT | Mod: CPTII,S$GLB,TXP, | Performed by: SURGERY

## 2020-12-17 PROCEDURE — 93923 PR NON-INVASIVE PHYSIOLOGIC STUDY EXTREMITY 3 LEVELS: ICD-10-PCS | Mod: S$GLB,TXP,, | Performed by: SURGERY

## 2020-12-17 PROCEDURE — 1159F PR MEDICATION LIST DOCUMENTED IN MEDICAL RECORD: ICD-10-PCS | Mod: S$GLB,TXP,, | Performed by: SURGERY

## 2020-12-17 PROCEDURE — 99999 PR PBB SHADOW E&M-EST. PATIENT-LVL IV: ICD-10-PCS | Mod: PBBFAC,TXP,, | Performed by: SURGERY

## 2020-12-17 PROCEDURE — 99204 OFFICE O/P NEW MOD 45 MIN: CPT | Mod: S$GLB,TXP,, | Performed by: SURGERY

## 2020-12-17 PROCEDURE — 1159F MED LIST DOCD IN RCRD: CPT | Mod: S$GLB,TXP,, | Performed by: SURGERY

## 2020-12-27 ENCOUNTER — LAB VISIT (OUTPATIENT)
Dept: URGENT CARE | Facility: CLINIC | Age: 65
End: 2020-12-27
Payer: MEDICARE

## 2020-12-27 DIAGNOSIS — Z01.812 PRE-PROCEDURE LAB EXAM: ICD-10-CM

## 2020-12-27 PROCEDURE — 99000 SPECIMEN HANDLING OFFICE-LAB: CPT | Mod: S$GLB,TXP,, | Performed by: FAMILY MEDICINE

## 2020-12-27 PROCEDURE — U0003 INFECTIOUS AGENT DETECTION BY NUCLEIC ACID (DNA OR RNA); SEVERE ACUTE RESPIRATORY SYNDROME CORONAVIRUS 2 (SARS-COV-2) (CORONAVIRUS DISEASE [COVID-19]), AMPLIFIED PROBE TECHNIQUE, MAKING USE OF HIGH THROUGHPUT TECHNOLOGIES AS DESCRIBED BY CMS-2020-01-R: HCPCS | Mod: TXP

## 2020-12-27 PROCEDURE — 99000 PR SPECIMEN HANDLING,DR OFF->LAB: ICD-10-PCS | Mod: S$GLB,TXP,, | Performed by: FAMILY MEDICINE

## 2020-12-28 DIAGNOSIS — C22.0 HCC (HEPATOCELLULAR CARCINOMA): Primary | ICD-10-CM

## 2020-12-28 LAB — SARS-COV-2 RNA RESP QL NAA+PROBE: NOT DETECTED

## 2020-12-28 RX ORDER — MIDAZOLAM HYDROCHLORIDE 1 MG/ML
1 INJECTION INTRAMUSCULAR; INTRAVENOUS
Status: CANCELLED | OUTPATIENT
Start: 2020-12-28

## 2020-12-28 RX ORDER — HEPARIN SODIUM 200 [USP'U]/100ML
1000 INJECTION, SOLUTION INTRAVENOUS CONTINUOUS
Status: CANCELLED | OUTPATIENT
Start: 2020-12-28

## 2020-12-28 RX ORDER — FENTANYL CITRATE 50 UG/ML
50 INJECTION, SOLUTION INTRAMUSCULAR; INTRAVENOUS
Status: CANCELLED | OUTPATIENT
Start: 2020-12-28

## 2020-12-29 ENCOUNTER — HOSPITAL ENCOUNTER (OUTPATIENT)
Dept: INTERVENTIONAL RADIOLOGY/VASCULAR | Facility: HOSPITAL | Age: 65
Discharge: HOME | End: 2020-12-30
Attending: STUDENT IN AN ORGANIZED HEALTH CARE EDUCATION/TRAINING PROGRAM | Admitting: STUDENT IN AN ORGANIZED HEALTH CARE EDUCATION/TRAINING PROGRAM
Payer: MEDICARE

## 2020-12-29 ENCOUNTER — RESEARCH ENCOUNTER (OUTPATIENT)
Dept: RESEARCH | Facility: HOSPITAL | Age: 65
End: 2020-12-29

## 2020-12-29 DIAGNOSIS — C22.0 HCC (HEPATOCELLULAR CARCINOMA): ICD-10-CM

## 2020-12-29 DIAGNOSIS — C22.0 HCC (HEPATOCELLULAR CARCINOMA): Primary | ICD-10-CM

## 2020-12-29 LAB
POCT GLUCOSE: 115 MG/DL (ref 70–110)
POCT GLUCOSE: 153 MG/DL (ref 70–110)

## 2020-12-29 PROCEDURE — 76380 CAT SCAN FOLLOW-UP STUDY: CPT | Mod: 26,59,NTX, | Performed by: STUDENT IN AN ORGANIZED HEALTH CARE EDUCATION/TRAINING PROGRAM

## 2020-12-29 PROCEDURE — 25000003 PHARM REV CODE 250: Mod: NTX | Performed by: STUDENT IN AN ORGANIZED HEALTH CARE EDUCATION/TRAINING PROGRAM

## 2020-12-29 PROCEDURE — 37243 VASC EMBOLIZE/OCCLUDE ORGAN: CPT | Mod: NTX,,, | Performed by: STUDENT IN AN ORGANIZED HEALTH CARE EDUCATION/TRAINING PROGRAM

## 2020-12-29 PROCEDURE — 99152 MOD SED SAME PHYS/QHP 5/>YRS: CPT | Mod: NTX

## 2020-12-29 PROCEDURE — 63600175 PHARM REV CODE 636 W HCPCS: Mod: NTX | Performed by: PHYSICIAN ASSISTANT

## 2020-12-29 PROCEDURE — 75774 ARTERY X-RAY EACH VESSEL: CPT | Mod: 26,59,NTX, | Performed by: STUDENT IN AN ORGANIZED HEALTH CARE EDUCATION/TRAINING PROGRAM

## 2020-12-29 PROCEDURE — 36248 INS CATH ABD/L-EXT ART ADDL: CPT | Mod: NTX

## 2020-12-29 PROCEDURE — 25000003 PHARM REV CODE 250: Mod: NTX | Performed by: PHYSICIAN ASSISTANT

## 2020-12-29 PROCEDURE — 25000003 PHARM REV CODE 250: Mod: TXP | Performed by: PHYSICIAN ASSISTANT

## 2020-12-29 PROCEDURE — 37243 IR EMBOLIZATION COMP FOR TUMOR_ORGAN ISCHEMIA_INFARC: ICD-10-PCS | Mod: NTX,,, | Performed by: STUDENT IN AN ORGANIZED HEALTH CARE EDUCATION/TRAINING PROGRAM

## 2020-12-29 PROCEDURE — 36247 INS CATH ABD/L-EXT ART 3RD: CPT | Mod: NTX

## 2020-12-29 PROCEDURE — G0378 HOSPITAL OBSERVATION PER HR: HCPCS | Mod: NTX

## 2020-12-29 PROCEDURE — C1894 INTRO/SHEATH, NON-LASER: HCPCS | Mod: NTX

## 2020-12-29 PROCEDURE — 36247 INS CATH ABD/L-EXT ART 3RD: CPT | Mod: 51,NTX,, | Performed by: STUDENT IN AN ORGANIZED HEALTH CARE EDUCATION/TRAINING PROGRAM

## 2020-12-29 PROCEDURE — 36248 INS CATH ABD/L-EXT ART ADDL: CPT | Mod: NTX,,, | Performed by: STUDENT IN AN ORGANIZED HEALTH CARE EDUCATION/TRAINING PROGRAM

## 2020-12-29 PROCEDURE — 96420 CHEMO IA PUSH TECNIQUE: CPT | Mod: TXP

## 2020-12-29 PROCEDURE — 11000001 HC ACUTE MED/SURG PRIVATE ROOM: Mod: NTX

## 2020-12-29 PROCEDURE — 76380 CAT SCAN FOLLOW-UP STUDY: CPT | Mod: TC,NTX,59

## 2020-12-29 PROCEDURE — 75726 ARTERY X-RAYS ABDOMEN: CPT | Mod: 26,59,NTX, | Performed by: STUDENT IN AN ORGANIZED HEALTH CARE EDUCATION/TRAINING PROGRAM

## 2020-12-29 PROCEDURE — 36247 PR PLACE CATH SUBSUBSELECT ART,ABD/PEL: ICD-10-PCS | Mod: 51,NTX,, | Performed by: STUDENT IN AN ORGANIZED HEALTH CARE EDUCATION/TRAINING PROGRAM

## 2020-12-29 PROCEDURE — G0269 OCCLUSIVE DEVICE IN VEIN ART: HCPCS | Mod: NTX

## 2020-12-29 PROCEDURE — 75887 PR  PERCUT XHEPATIC PORTOGRAM: ICD-10-PCS | Mod: 26,NTX,, | Performed by: STUDENT IN AN ORGANIZED HEALTH CARE EDUCATION/TRAINING PROGRAM

## 2020-12-29 PROCEDURE — 77263 PR  RADIATION THERAPY PLAN COMPLEX: ICD-10-PCS | Mod: NTX,,, | Performed by: STUDENT IN AN ORGANIZED HEALTH CARE EDUCATION/TRAINING PROGRAM

## 2020-12-29 PROCEDURE — 77263 THER RADIOLOGY TX PLNG CPLX: CPT | Mod: NTX,,, | Performed by: STUDENT IN AN ORGANIZED HEALTH CARE EDUCATION/TRAINING PROGRAM

## 2020-12-29 PROCEDURE — 76377 PR  3D RENDERING W/ IMAGE POSTPROCESS: ICD-10-PCS | Mod: 26,NTX,, | Performed by: STUDENT IN AN ORGANIZED HEALTH CARE EDUCATION/TRAINING PROGRAM

## 2020-12-29 PROCEDURE — G0379 DIRECT REFER HOSPITAL OBSERV: HCPCS | Mod: TXP

## 2020-12-29 PROCEDURE — 76377 3D RENDER W/INTRP POSTPROCES: CPT | Mod: TC,TXP

## 2020-12-29 PROCEDURE — 76377 3D RENDER W/INTRP POSTPROCES: CPT | Mod: 26,NTX,, | Performed by: STUDENT IN AN ORGANIZED HEALTH CARE EDUCATION/TRAINING PROGRAM

## 2020-12-29 PROCEDURE — 76380 PR  CT SCAN,LIMITED/LOCALIZED F/U STUDY: ICD-10-PCS | Mod: 26,59,NTX, | Performed by: STUDENT IN AN ORGANIZED HEALTH CARE EDUCATION/TRAINING PROGRAM

## 2020-12-29 PROCEDURE — 76937 PR  US GUIDE, VASCULAR ACCESS: ICD-10-PCS | Mod: 26,NTX,, | Performed by: STUDENT IN AN ORGANIZED HEALTH CARE EDUCATION/TRAINING PROGRAM

## 2020-12-29 PROCEDURE — 25000003 PHARM REV CODE 250: Mod: TXP | Performed by: STUDENT IN AN ORGANIZED HEALTH CARE EDUCATION/TRAINING PROGRAM

## 2020-12-29 PROCEDURE — 63600175 PHARM REV CODE 636 W HCPCS: Mod: TXP | Performed by: STUDENT IN AN ORGANIZED HEALTH CARE EDUCATION/TRAINING PROGRAM

## 2020-12-29 PROCEDURE — 75726 ARTERY X-RAYS ABDOMEN: CPT | Mod: TC,NTX,59

## 2020-12-29 PROCEDURE — 75774 PR  ANGIO EA ADDNL SELECTV VESSEL: ICD-10-PCS | Mod: 26,59,NTX, | Performed by: STUDENT IN AN ORGANIZED HEALTH CARE EDUCATION/TRAINING PROGRAM

## 2020-12-29 PROCEDURE — 75887 VEIN X-RAY LIVER W/O HEMODYN: CPT | Mod: 26,NTX,, | Performed by: STUDENT IN AN ORGANIZED HEALTH CARE EDUCATION/TRAINING PROGRAM

## 2020-12-29 PROCEDURE — 63600175 PHARM REV CODE 636 W HCPCS: Mod: NTX | Performed by: STUDENT IN AN ORGANIZED HEALTH CARE EDUCATION/TRAINING PROGRAM

## 2020-12-29 PROCEDURE — 27200996 IR EMBOLIZATION COMP FOR TUMOR_ORGAN ISCHEMIA_INFARC: Mod: NTX

## 2020-12-29 PROCEDURE — 82962 GLUCOSE BLOOD TEST: CPT | Mod: NTX

## 2020-12-29 PROCEDURE — 75887 VEIN X-RAY LIVER W/O HEMODYN: CPT | Mod: TC,TXP

## 2020-12-29 PROCEDURE — 75774 ARTERY X-RAY EACH VESSEL: CPT | Mod: TC,TXP

## 2020-12-29 PROCEDURE — 25500020 PHARM REV CODE 255: Mod: NTX | Performed by: STUDENT IN AN ORGANIZED HEALTH CARE EDUCATION/TRAINING PROGRAM

## 2020-12-29 PROCEDURE — 63600175 PHARM REV CODE 636 W HCPCS: Mod: NTX

## 2020-12-29 PROCEDURE — 76937 US GUIDE VASCULAR ACCESS: CPT | Mod: TC,TXP

## 2020-12-29 PROCEDURE — 36248 PR PR INS CATH ABD/L-EXT ART ADDL 2ND ORD/3RD ORD/BYD: ICD-10-PCS | Mod: NTX,,, | Performed by: STUDENT IN AN ORGANIZED HEALTH CARE EDUCATION/TRAINING PROGRAM

## 2020-12-29 PROCEDURE — 75726 CHG ANGIO VISCERAL SELECTV/SUBSELEC: ICD-10-PCS | Mod: 26,59,NTX, | Performed by: STUDENT IN AN ORGANIZED HEALTH CARE EDUCATION/TRAINING PROGRAM

## 2020-12-29 PROCEDURE — 99153 MOD SED SAME PHYS/QHP EA: CPT | Mod: NTX

## 2020-12-29 PROCEDURE — 76937 US GUIDE VASCULAR ACCESS: CPT | Mod: 26,NTX,, | Performed by: STUDENT IN AN ORGANIZED HEALTH CARE EDUCATION/TRAINING PROGRAM

## 2020-12-29 RX ORDER — AMLODIPINE BESYLATE 5 MG/1
5 TABLET ORAL DAILY
Status: DISCONTINUED | OUTPATIENT
Start: 2020-12-30 | End: 2020-12-30 | Stop reason: HOSPADM

## 2020-12-29 RX ORDER — OXYCODONE HYDROCHLORIDE 5 MG/1
5 TABLET ORAL ONCE
Status: DISCONTINUED | OUTPATIENT
Start: 2020-12-29 | End: 2020-12-30 | Stop reason: HOSPADM

## 2020-12-29 RX ORDER — IBUPROFEN 200 MG
24 TABLET ORAL
Status: DISCONTINUED | OUTPATIENT
Start: 2020-12-29 | End: 2020-12-30 | Stop reason: HOSPADM

## 2020-12-29 RX ORDER — ONDANSETRON 2 MG/ML
INJECTION INTRAMUSCULAR; INTRAVENOUS CODE/TRAUMA/SEDATION MEDICATION
Status: COMPLETED | OUTPATIENT
Start: 2020-12-29 | End: 2020-12-29

## 2020-12-29 RX ORDER — FENTANYL CITRATE 50 UG/ML
INJECTION, SOLUTION INTRAMUSCULAR; INTRAVENOUS CODE/TRAUMA/SEDATION MEDICATION
Status: COMPLETED | OUTPATIENT
Start: 2020-12-29 | End: 2020-12-29

## 2020-12-29 RX ORDER — LIDOCAINE HYDROCHLORIDE 10 MG/ML
INJECTION INFILTRATION; PERINEURAL CODE/TRAUMA/SEDATION MEDICATION
Status: COMPLETED | OUTPATIENT
Start: 2020-12-29 | End: 2020-12-29

## 2020-12-29 RX ORDER — MIDAZOLAM HYDROCHLORIDE 1 MG/ML
INJECTION INTRAMUSCULAR; INTRAVENOUS CODE/TRAUMA/SEDATION MEDICATION
Status: COMPLETED | OUTPATIENT
Start: 2020-12-29 | End: 2020-12-29

## 2020-12-29 RX ORDER — LIDOCAINE HYDROCHLORIDE 10 MG/ML
1 INJECTION, SOLUTION EPIDURAL; INFILTRATION; INTRACAUDAL; PERINEURAL ONCE AS NEEDED
Status: COMPLETED | OUTPATIENT
Start: 2020-12-29 | End: 2020-12-29

## 2020-12-29 RX ORDER — HEPARIN SODIUM 200 [USP'U]/100ML
INJECTION, SOLUTION INTRAVENOUS
Status: COMPLETED | OUTPATIENT
Start: 2020-12-29 | End: 2020-12-29

## 2020-12-29 RX ORDER — SODIUM CHLORIDE 9 MG/ML
INJECTION, SOLUTION INTRAVENOUS CONTINUOUS
Status: DISCONTINUED | OUTPATIENT
Start: 2020-12-29 | End: 2020-12-30 | Stop reason: HOSPADM

## 2020-12-29 RX ORDER — GLUCAGON 1 MG
1 KIT INJECTION
Status: DISCONTINUED | OUTPATIENT
Start: 2020-12-29 | End: 2020-12-30 | Stop reason: HOSPADM

## 2020-12-29 RX ORDER — KETOROLAC TROMETHAMINE 30 MG/ML
15 INJECTION, SOLUTION INTRAMUSCULAR; INTRAVENOUS ONCE
Status: COMPLETED | OUTPATIENT
Start: 2020-12-29 | End: 2020-12-29

## 2020-12-29 RX ORDER — PANTOPRAZOLE SODIUM 40 MG/1
40 TABLET, DELAYED RELEASE ORAL DAILY
Status: DISCONTINUED | OUTPATIENT
Start: 2020-12-30 | End: 2020-12-30

## 2020-12-29 RX ORDER — DIPHENHYDRAMINE HYDROCHLORIDE 50 MG/ML
50 INJECTION INTRAMUSCULAR; INTRAVENOUS ONCE
Status: COMPLETED | OUTPATIENT
Start: 2020-12-29 | End: 2020-12-29

## 2020-12-29 RX ORDER — LISINOPRIL 20 MG/1
20 TABLET ORAL ONCE
Status: COMPLETED | OUTPATIENT
Start: 2020-12-29 | End: 2020-12-29

## 2020-12-29 RX ORDER — ONDANSETRON 8 MG/1
8 TABLET, ORALLY DISINTEGRATING ORAL EVERY 6 HOURS PRN
Status: DISCONTINUED | OUTPATIENT
Start: 2020-12-29 | End: 2020-12-30 | Stop reason: HOSPADM

## 2020-12-29 RX ORDER — GABAPENTIN 300 MG/1
300 CAPSULE ORAL 3 TIMES DAILY
Status: DISCONTINUED | OUTPATIENT
Start: 2020-12-29 | End: 2020-12-30 | Stop reason: HOSPADM

## 2020-12-29 RX ORDER — OXYCODONE HYDROCHLORIDE 5 MG/1
5 TABLET ORAL EVERY 4 HOURS PRN
Status: DISCONTINUED | OUTPATIENT
Start: 2020-12-29 | End: 2020-12-30 | Stop reason: HOSPADM

## 2020-12-29 RX ORDER — LISINOPRIL 20 MG/1
20 TABLET ORAL DAILY
Status: DISCONTINUED | OUTPATIENT
Start: 2020-12-30 | End: 2020-12-30 | Stop reason: HOSPADM

## 2020-12-29 RX ORDER — IBUPROFEN 200 MG
16 TABLET ORAL
Status: DISCONTINUED | OUTPATIENT
Start: 2020-12-29 | End: 2020-12-30 | Stop reason: HOSPADM

## 2020-12-29 RX ORDER — INSULIN ASPART 100 [IU]/ML
0-5 INJECTION, SOLUTION INTRAVENOUS; SUBCUTANEOUS
Status: DISCONTINUED | OUTPATIENT
Start: 2020-12-29 | End: 2020-12-30 | Stop reason: HOSPADM

## 2020-12-29 RX ADMIN — SODIUM CHLORIDE 75 ML/HR: 0.9 INJECTION, SOLUTION INTRAVENOUS at 12:12

## 2020-12-29 RX ADMIN — HYDROCORTISONE SODIUM SUCCINATE 200 MG: 100 INJECTION, POWDER, FOR SOLUTION INTRAMUSCULAR; INTRAVENOUS at 02:12

## 2020-12-29 RX ADMIN — FENTANYL CITRATE 50 MCG: 50 INJECTION, SOLUTION INTRAMUSCULAR; INTRAVENOUS at 04:12

## 2020-12-29 RX ADMIN — SODIUM CHLORIDE 75 ML/HR: 0.9 INJECTION, SOLUTION INTRAVENOUS at 09:12

## 2020-12-29 RX ADMIN — OXYCODONE HYDROCHLORIDE 5 MG: 5 TABLET ORAL at 09:12

## 2020-12-29 RX ADMIN — FENTANYL CITRATE 50 MCG: 50 INJECTION, SOLUTION INTRAMUSCULAR; INTRAVENOUS at 03:12

## 2020-12-29 RX ADMIN — KETOROLAC TROMETHAMINE 15 MG: 30 INJECTION, SOLUTION INTRAMUSCULAR; INTRAVENOUS at 07:12

## 2020-12-29 RX ADMIN — LIDOCAINE HYDROCHLORIDE 2 MG: 10 INJECTION, SOLUTION EPIDURAL; INFILTRATION; INTRACAUDAL at 12:12

## 2020-12-29 RX ADMIN — IOHEXOL 60 ML: 300 INJECTION, SOLUTION INTRAVENOUS at 04:12

## 2020-12-29 RX ADMIN — HEPARIN SODIUM IN SODIUM CHLORIDE 2000 UNITS/HR: 200 INJECTION INTRAVENOUS at 03:12

## 2020-12-29 RX ADMIN — LIDOCAINE HYDROCHLORIDE 5 ML: 10 INJECTION, SOLUTION INFILTRATION; PERINEURAL at 03:12

## 2020-12-29 RX ADMIN — ONDANSETRON 4 MG: 2 INJECTION, SOLUTION INTRAMUSCULAR; INTRAVENOUS at 03:12

## 2020-12-29 RX ADMIN — OXYCODONE HYDROCHLORIDE 5 MG: 5 TABLET ORAL at 04:12

## 2020-12-29 RX ADMIN — MIDAZOLAM HYDROCHLORIDE 1 MG: 1 INJECTION, SOLUTION INTRAMUSCULAR; INTRAVENOUS at 03:12

## 2020-12-29 RX ADMIN — DIPHENHYDRAMINE HYDROCHLORIDE 50 MG: 50 INJECTION, SOLUTION INTRAMUSCULAR; INTRAVENOUS at 02:12

## 2020-12-29 RX ADMIN — LISINOPRIL 20 MG: 20 TABLET ORAL at 10:12

## 2020-12-29 RX ADMIN — AMPICILLIN SODIUM AND SULBACTAM SODIUM 3 G: 2; 1 INJECTION, POWDER, FOR SOLUTION INTRAMUSCULAR; INTRAVENOUS at 02:12

## 2020-12-29 RX ADMIN — GABAPENTIN 300 MG: 300 CAPSULE ORAL at 10:12

## 2020-12-29 RX ADMIN — MIDAZOLAM HYDROCHLORIDE 1 MG: 1 INJECTION, SOLUTION INTRAMUSCULAR; INTRAVENOUS at 04:12

## 2020-12-29 NOTE — PROGRESS NOTES
RESEARCH STUDY CONSENT ENCOUNTER  ORGAN TRANSPLANT  [facility] [address]    Study Title: Role of Tumor-Induced Immune Tolerance in the Patient Response to Locoregional Therapy: Implications in Assessment Risk of Hepatocellular Carcinoma Recurrence Following Liver Transplantation    IRB #: [id number].B    IRB Approval Date: 6/8/2016    : Werner Zamarripa MD  Sub-investigator: Lorne Maradiaga, PhD    Patient Number: P1**    Patient was scheduled for a TACE on (date: 12/28/2020).     This study is an observational study to evaluate patients receiving transarterial chemoembolization (TACE) or transarterial radioembolization (TARE) therapy to define the link between tumor-elicited peripheral cell populations, and the risk of hepatocellular carcinoma (HCC) recurrence before orthotopic liver transplantation (OLT). [IRB 2016.131.B]      Present for discussion: Patient was alone   Is LAR Consenting for Subject: YES/NO: no    Prior to the Informed Consent (IC) being signed, or any protocol required testing, procedure, or intervention being performed, the following was done or discussed with Tej Purdy:    Purpose of the Study, Qualifications to Participate: N/A  Study Design, Schedule and Procedures: N/A  Risks, Benefits, Alternative Treatments, Compensation and Costs: N/A  Confidentiality and HIPAA Authorization for Release of Medical Records for the research trial/subject's right/study related injury: N/A  Study related contact information: N/A  Voluntary Participation and Withdrawal from the research trial at any time: N/A  Patient has been offered the opportunity to ask questions regarding the study and all questions were answered satisfactorily: N/A  Patient verbalizes understanding of the study/procedures and agrees to participate: Declined   CRC and PI contact information given to patient:N/A  Verification the ICF was appropriately completed: N/A  Signed copy given to patient: N/A  Copy in patient's  "chart and original uploaded to [facility]: N/A    Research staff present during consent: Gracie Zahng     Mr. Purdy declined on 12/29/2020. During time of consent patient stated he was not interested in any study. Yelled and said " WALK AWAY NOW".       No study procedures (I.e. specimen collection) were performed before the informed consent was signed.     Specimens were collected in [facility] at the time of peripheral IV line placement: N/A  Specimens were collected in [facility] RAD IR: N/A    Gracie Zhang  Admin Research- Liver Transplant    "

## 2020-12-30 ENCOUNTER — ANESTHESIA (OUTPATIENT)
Dept: INTERVENTIONAL RADIOLOGY/VASCULAR | Facility: HOSPITAL | Age: 65
End: 2020-12-30
Payer: MEDICARE

## 2020-12-30 VITALS
HEIGHT: 72 IN | SYSTOLIC BLOOD PRESSURE: 189 MMHG | RESPIRATION RATE: 5 BRPM | OXYGEN SATURATION: 92 % | HEART RATE: 77 BPM | TEMPERATURE: 97 F | DIASTOLIC BLOOD PRESSURE: 77 MMHG | BODY MASS INDEX: 29.44 KG/M2 | WEIGHT: 217.38 LBS

## 2020-12-30 LAB
ALBUMIN SERPL BCP-MCNC: 4 G/DL (ref 3.5–5.2)
ALP SERPL-CCNC: 78 U/L (ref 55–135)
ALT SERPL W/O P-5'-P-CCNC: 23 U/L (ref 10–44)
ANION GAP SERPL CALC-SCNC: 12 MMOL/L (ref 8–16)
AST SERPL-CCNC: 20 U/L (ref 10–40)
BASOPHILS # BLD AUTO: 0.06 K/UL (ref 0–0.2)
BASOPHILS NFR BLD: 0.5 % (ref 0–1.9)
BILIRUB SERPL-MCNC: 0.7 MG/DL (ref 0.1–1)
BUN SERPL-MCNC: 24 MG/DL (ref 8–23)
CALCIUM SERPL-MCNC: 8.6 MG/DL (ref 8.7–10.5)
CHLORIDE SERPL-SCNC: 103 MMOL/L (ref 95–110)
CO2 SERPL-SCNC: 22 MMOL/L (ref 23–29)
CREAT SERPL-MCNC: 0.8 MG/DL (ref 0.5–1.4)
DIFFERENTIAL METHOD: ABNORMAL
EOSINOPHIL # BLD AUTO: 0.1 K/UL (ref 0–0.5)
EOSINOPHIL NFR BLD: 0.5 % (ref 0–8)
ERYTHROCYTE [DISTWIDTH] IN BLOOD BY AUTOMATED COUNT: 13.8 % (ref 11.5–14.5)
EST. GFR  (AFRICAN AMERICAN): >60 ML/MIN/1.73 M^2
EST. GFR  (NON AFRICAN AMERICAN): >60 ML/MIN/1.73 M^2
GLUCOSE SERPL-MCNC: 108 MG/DL (ref 70–110)
HCT VFR BLD AUTO: 44.8 % (ref 40–54)
HGB BLD-MCNC: 14.9 G/DL (ref 14–18)
IMM GRANULOCYTES # BLD AUTO: 0.07 K/UL (ref 0–0.04)
IMM GRANULOCYTES NFR BLD AUTO: 0.5 % (ref 0–0.5)
LYMPHOCYTES # BLD AUTO: 1.2 K/UL (ref 1–4.8)
LYMPHOCYTES NFR BLD: 9.6 % (ref 18–48)
MCH RBC QN AUTO: 30 PG (ref 27–31)
MCHC RBC AUTO-ENTMCNC: 33.3 G/DL (ref 32–36)
MCV RBC AUTO: 90 FL (ref 82–98)
MONOCYTES # BLD AUTO: 1.3 K/UL (ref 0.3–1)
MONOCYTES NFR BLD: 10.5 % (ref 4–15)
NEUTROPHILS # BLD AUTO: 10 K/UL (ref 1.8–7.7)
NEUTROPHILS NFR BLD: 78.4 % (ref 38–73)
NRBC BLD-RTO: 0 /100 WBC
PLATELET # BLD AUTO: 163 K/UL (ref 150–350)
PMV BLD AUTO: 10.6 FL (ref 9.2–12.9)
POCT GLUCOSE: 102 MG/DL (ref 70–110)
POCT GLUCOSE: 117 MG/DL (ref 70–110)
POCT GLUCOSE: 122 MG/DL (ref 70–110)
POCT GLUCOSE: 126 MG/DL (ref 70–110)
POTASSIUM SERPL-SCNC: 4 MMOL/L (ref 3.5–5.1)
PROT SERPL-MCNC: 7.3 G/DL (ref 6–8.4)
RBC # BLD AUTO: 4.97 M/UL (ref 4.6–6.2)
SODIUM SERPL-SCNC: 137 MMOL/L (ref 136–145)
WBC # BLD AUTO: 12.73 K/UL (ref 3.9–12.7)

## 2020-12-30 PROCEDURE — 80053 COMPREHEN METABOLIC PANEL: CPT | Mod: NTX

## 2020-12-30 PROCEDURE — 25000003 PHARM REV CODE 250: Mod: NTX | Performed by: STUDENT IN AN ORGANIZED HEALTH CARE EDUCATION/TRAINING PROGRAM

## 2020-12-30 PROCEDURE — 85025 COMPLETE CBC W/AUTO DIFF WBC: CPT | Mod: TXP

## 2020-12-30 PROCEDURE — 25000003 PHARM REV CODE 250: Mod: TXP | Performed by: STUDENT IN AN ORGANIZED HEALTH CARE EDUCATION/TRAINING PROGRAM

## 2020-12-30 PROCEDURE — 82962 GLUCOSE BLOOD TEST: CPT | Mod: NTX

## 2020-12-30 PROCEDURE — D9220A PRA ANESTHESIA: ICD-10-PCS | Mod: ANES,NTX,, | Performed by: ANESTHESIOLOGY

## 2020-12-30 PROCEDURE — D9220A PRA ANESTHESIA: Mod: CRNA,NTX,, | Performed by: NURSE ANESTHETIST, CERTIFIED REGISTERED

## 2020-12-30 PROCEDURE — D9220A PRA ANESTHESIA: Mod: ANES,NTX,, | Performed by: ANESTHESIOLOGY

## 2020-12-30 PROCEDURE — 63600175 PHARM REV CODE 636 W HCPCS: Mod: NTX | Performed by: NURSE ANESTHETIST, CERTIFIED REGISTERED

## 2020-12-30 PROCEDURE — 36415 COLL VENOUS BLD VENIPUNCTURE: CPT | Mod: NTX

## 2020-12-30 PROCEDURE — 94761 N-INVAS EAR/PLS OXIMETRY MLT: CPT | Mod: NTX

## 2020-12-30 PROCEDURE — 25000003 PHARM REV CODE 250: Mod: NTX | Performed by: NURSE ANESTHETIST, CERTIFIED REGISTERED

## 2020-12-30 PROCEDURE — G0378 HOSPITAL OBSERVATION PER HR: HCPCS | Mod: NTX

## 2020-12-30 PROCEDURE — D9220A PRA ANESTHESIA: ICD-10-PCS | Mod: CRNA,NTX,, | Performed by: NURSE ANESTHETIST, CERTIFIED REGISTERED

## 2020-12-30 RX ORDER — HALOPERIDOL 5 MG/ML
0.5 INJECTION INTRAMUSCULAR EVERY 10 MIN PRN
Status: DISCONTINUED | OUTPATIENT
Start: 2020-12-30 | End: 2020-12-30 | Stop reason: HOSPADM

## 2020-12-30 RX ORDER — ROCURONIUM BROMIDE 10 MG/ML
INJECTION, SOLUTION INTRAVENOUS
Status: DISCONTINUED | OUTPATIENT
Start: 2020-12-30 | End: 2020-12-30

## 2020-12-30 RX ORDER — FENTANYL CITRATE 50 UG/ML
25 INJECTION, SOLUTION INTRAMUSCULAR; INTRAVENOUS EVERY 5 MIN PRN
Status: DISCONTINUED | OUTPATIENT
Start: 2020-12-30 | End: 2020-12-30 | Stop reason: HOSPADM

## 2020-12-30 RX ORDER — FENTANYL CITRATE 50 UG/ML
INJECTION, SOLUTION INTRAMUSCULAR; INTRAVENOUS
Status: DISCONTINUED | OUTPATIENT
Start: 2020-12-30 | End: 2020-12-30

## 2020-12-30 RX ORDER — LIDOCAINE HCL/PF 100 MG/5ML
SYRINGE (ML) INTRAVENOUS
Status: DISCONTINUED | OUTPATIENT
Start: 2020-12-30 | End: 2020-12-30

## 2020-12-30 RX ORDER — SODIUM CHLORIDE 0.9 % (FLUSH) 0.9 %
3 SYRINGE (ML) INJECTION
Status: DISCONTINUED | OUTPATIENT
Start: 2020-12-30 | End: 2020-12-30 | Stop reason: HOSPADM

## 2020-12-30 RX ORDER — SODIUM CHLORIDE 9 MG/ML
INJECTION, SOLUTION INTRAVENOUS CONTINUOUS
Status: DISCONTINUED | OUTPATIENT
Start: 2020-12-30 | End: 2020-12-30 | Stop reason: HOSPADM

## 2020-12-30 RX ORDER — SUCCINYLCHOLINE CHLORIDE 20 MG/ML
INJECTION INTRAMUSCULAR; INTRAVENOUS
Status: DISCONTINUED | OUTPATIENT
Start: 2020-12-30 | End: 2020-12-30

## 2020-12-30 RX ORDER — KETOROLAC TROMETHAMINE 30 MG/ML
15 INJECTION, SOLUTION INTRAMUSCULAR; INTRAVENOUS EVERY 6 HOURS PRN
Status: DISCONTINUED | OUTPATIENT
Start: 2020-12-30 | End: 2020-12-30 | Stop reason: HOSPADM

## 2020-12-30 RX ORDER — CIPROFLOXACIN 500 MG/1
500 TABLET ORAL EVERY 12 HOURS
Status: DISCONTINUED | OUTPATIENT
Start: 2020-12-30 | End: 2020-12-30 | Stop reason: HOSPADM

## 2020-12-30 RX ORDER — PANTOPRAZOLE SODIUM 40 MG/1
40 TABLET, DELAYED RELEASE ORAL DAILY
Status: DISCONTINUED | OUTPATIENT
Start: 2020-12-30 | End: 2020-12-30 | Stop reason: HOSPADM

## 2020-12-30 RX ORDER — LIDOCAINE HYDROCHLORIDE 10 MG/ML
INJECTION INFILTRATION; PERINEURAL CODE/TRAUMA/SEDATION MEDICATION
Status: DISCONTINUED | OUTPATIENT
Start: 2020-12-30 | End: 2020-12-30 | Stop reason: HOSPADM

## 2020-12-30 RX ORDER — MIDAZOLAM HYDROCHLORIDE 1 MG/ML
INJECTION INTRAMUSCULAR; INTRAVENOUS
Status: DISCONTINUED | OUTPATIENT
Start: 2020-12-30 | End: 2020-12-30

## 2020-12-30 RX ORDER — ONDANSETRON 8 MG/1
8 TABLET, ORALLY DISINTEGRATING ORAL EVERY 6 HOURS PRN
Qty: 28 TABLET | Refills: 0 | Status: SHIPPED | OUTPATIENT
Start: 2020-12-30 | End: 2021-01-06

## 2020-12-30 RX ORDER — PHENYLEPHRINE HYDROCHLORIDE 10 MG/ML
INJECTION INTRAVENOUS
Status: DISCONTINUED | OUTPATIENT
Start: 2020-12-30 | End: 2020-12-30

## 2020-12-30 RX ORDER — PROPOFOL 10 MG/ML
VIAL (ML) INTRAVENOUS
Status: DISCONTINUED | OUTPATIENT
Start: 2020-12-30 | End: 2020-12-30

## 2020-12-30 RX ORDER — OXYCODONE HYDROCHLORIDE 5 MG/1
5 TABLET ORAL EVERY 6 HOURS PRN
Qty: 20 TABLET | Refills: 0 | Status: ON HOLD | OUTPATIENT
Start: 2020-12-30 | End: 2021-01-20 | Stop reason: HOSPADM

## 2020-12-30 RX ORDER — NEOSTIGMINE METHYLSULFATE 0.5 MG/ML
INJECTION, SOLUTION INTRAVENOUS
Status: DISCONTINUED | OUTPATIENT
Start: 2020-12-30 | End: 2020-12-30

## 2020-12-30 RX ORDER — CEFAZOLIN SODIUM 1 G/3ML
INJECTION, POWDER, FOR SOLUTION INTRAMUSCULAR; INTRAVENOUS
Status: DISCONTINUED | OUTPATIENT
Start: 2020-12-30 | End: 2020-12-30

## 2020-12-30 RX ORDER — CIPROFLOXACIN 500 MG/1
500 TABLET ORAL EVERY 12 HOURS
Qty: 10 TABLET | Refills: 0 | Status: SHIPPED | OUTPATIENT
Start: 2020-12-30 | End: 2021-01-15 | Stop reason: CLARIF

## 2020-12-30 RX ADMIN — GABAPENTIN 300 MG: 300 CAPSULE ORAL at 03:12

## 2020-12-30 RX ADMIN — GABAPENTIN 300 MG: 300 CAPSULE ORAL at 10:12

## 2020-12-30 RX ADMIN — PHENYLEPHRINE HYDROCHLORIDE 500 MCG: 10 INJECTION INTRAVENOUS at 08:12

## 2020-12-30 RX ADMIN — ONDANSETRON 8 MG: 8 TABLET, ORALLY DISINTEGRATING ORAL at 01:12

## 2020-12-30 RX ADMIN — OXYCODONE HYDROCHLORIDE 5 MG: 5 TABLET ORAL at 03:12

## 2020-12-30 RX ADMIN — CIPROFLOXACIN 500 MG: 500 TABLET, FILM COATED ORAL at 10:12

## 2020-12-30 RX ADMIN — PHENYLEPHRINE HYDROCHLORIDE 500 MCG: 10 INJECTION INTRAVENOUS at 09:12

## 2020-12-30 RX ADMIN — NEOSTIGMINE METHYLSULFATE 2.5 MG: 0.5 INJECTION INTRAVENOUS at 09:12

## 2020-12-30 RX ADMIN — PANTOPRAZOLE SODIUM 40 MG: 40 TABLET, DELAYED RELEASE ORAL at 05:12

## 2020-12-30 RX ADMIN — CEFAZOLIN 2 G: 330 INJECTION, POWDER, FOR SOLUTION INTRAMUSCULAR; INTRAVENOUS at 08:12

## 2020-12-30 RX ADMIN — LIDOCAINE HYDROCHLORIDE 5 ML: 10 INJECTION, SOLUTION INFILTRATION; PERINEURAL at 08:12

## 2020-12-30 RX ADMIN — PROPOFOL 10 MG: 10 INJECTION, EMULSION INTRAVENOUS at 09:12

## 2020-12-30 RX ADMIN — Medication 100 MG: at 08:12

## 2020-12-30 RX ADMIN — PROPOFOL 170 MG: 10 INJECTION, EMULSION INTRAVENOUS at 08:12

## 2020-12-30 RX ADMIN — PROPOFOL 30 MG: 10 INJECTION, EMULSION INTRAVENOUS at 09:12

## 2020-12-30 RX ADMIN — OXYCODONE HYDROCHLORIDE 5 MG: 5 TABLET ORAL at 04:12

## 2020-12-30 RX ADMIN — ROCURONIUM BROMIDE 10 MG: 10 INJECTION, SOLUTION INTRAVENOUS at 08:12

## 2020-12-30 RX ADMIN — ROCURONIUM BROMIDE 30 MG: 10 INJECTION, SOLUTION INTRAVENOUS at 08:12

## 2020-12-30 RX ADMIN — SUCCINYLCHOLINE CHLORIDE 170 MG: 20 INJECTION, SOLUTION INTRAMUSCULAR; INTRAVENOUS at 08:12

## 2020-12-30 RX ADMIN — MIDAZOLAM HYDROCHLORIDE 2 MG: 1 INJECTION, SOLUTION INTRAMUSCULAR; INTRAVENOUS at 08:12

## 2020-12-30 RX ADMIN — LISINOPRIL 20 MG: 10 TABLET ORAL at 02:12

## 2020-12-30 RX ADMIN — FENTANYL CITRATE 100 MCG: 50 INJECTION, SOLUTION INTRAMUSCULAR; INTRAVENOUS at 08:12

## 2020-12-30 RX ADMIN — AMLODIPINE BESYLATE 5 MG: 5 TABLET ORAL at 10:12

## 2020-12-30 RX ADMIN — OXYCODONE HYDROCHLORIDE 5 MG: 5 TABLET ORAL at 10:12

## 2020-12-30 RX ADMIN — ONDANSETRON 8 MG: 8 TABLET, ORALLY DISINTEGRATING ORAL at 08:12

## 2020-12-30 NOTE — TRANSFER OF CARE
Anesthesia Transfer of Care Note    Patient: Tej Purdy    Procedure(s) Performed: * No procedures listed *    Patient location: PACU    Anesthesia Type: general    Transport from OR: Transported from OR on 6-10 L/min O2 by face mask with adequate spontaneous ventilation    Post pain: adequate analgesia    Post assessment: no apparent anesthetic complications and tolerated procedure well    Post vital signs: stable    Level of consciousness: awake and alert    Nausea/Vomiting: no nausea/vomiting    Complications: none    Transfer of care protocol was followed      Last vitals:   Visit Vitals  BP (!) 209/95 (BP Location: Left arm, Patient Position: Lying)   Pulse (!) 128   Temp 36.2 °C (97.2 °F) (Axillary)   Resp 20   Ht 6' (1.829 m)   Wt 98.6 kg (217 lb 6 oz)   SpO2 (!) 94%   BMI 29.48 kg/m²

## 2020-12-30 NOTE — ANESTHESIA PREPROCEDURE EVALUATION
12/30/2020  Pre-operative evaluation for * No procedures listed *    Tej Purdy is a 65 y.o. male HTN, tobacco abuse, HCC s/p TACE (chemo implant) now with active N/V. Here for hepatic tumor embolization.     Patient Active Problem List   Diagnosis    Liver mass    Diabetes    Hyperlipidemia    HTN (hypertension)    CASTILLO (nonalcoholic steatohepatitis)    Skin cancer    Cirrhosis    Portal hypertension    Kidney stone    Diabetic neuropathy    PAD (peripheral artery disease)    HCC (hepatocellular carcinoma)       Review of patient's allergies indicates:   Allergen Reactions    Bee pollens Swelling     BEE STINGS swells body       No current facility-administered medications on file prior to encounter.      Current Outpatient Medications on File Prior to Encounter   Medication Sig Dispense Refill    amLODIPine (NORVASC) 5 MG tablet Take 5 mg by mouth once daily. Take 5 mg by mouth once daily.      gabapentin (NEURONTIN) 300 MG capsule Take 300 mg by mouth 3 (three) times daily.      glipiZIDE (GLUCOTROL) 10 MG tablet       insulin glargine (LANTUS) 100 unit/mL injection       ketoconazole (NIZORAL) 2 % cream Apply to itchy rash on groin twice daily for one month. 60 g 6    lisinopriL (PRINIVIL,ZESTRIL) 20 MG tablet Take 20 mg by mouth.      lovastatin (MEVACOR) 20 MG tablet Take 20 mg by mouth.      metFORMIN (GLUCOPHAGE) 1000 MG tablet Take 1,000 mg by mouth.      pantoprazole (PROTONIX) 20 MG tablet Take 20 mg by mouth once daily.      sertraline (ZOLOFT) 25 MG tablet       amoxicillin (AMOXIL) 500 MG capsule Take 4 capsules (2,000 mg total) by mouth On call Procedure (take one hour before the dental procedure). 4 capsule 2    aspirin (ECOTRIN) 81 MG EC tablet Take 81 mg by mouth once daily.      omega-3 fatty acids/fish oil (FISH OIL-OMEGA-3 FATTY ACIDS) 300-1,000 mg capsule  Take by mouth once daily.         Past Surgical History:   Procedure Laterality Date    COLONOSCOPY  10/2020    ESOPHAGOGASTRODUODENOSCOPY  10/2020    EXCISION OF HYDROCELE Right     hemorroid surgery      hemorroidectomy      KIDNEY STONE SURGERY               CBC:   Recent Labs     20  1008 20  0521   WBC 12.69 12.73*   RBC 5.31 4.97   HGB 15.8 14.9   HCT 49.0 44.8    163   MCV 92 90   MCH 29.8 30.0   MCHC 32.2 33.3       CMP:   Recent Labs     20  1008 20  0521    137   K 4.2 4.0    103   CO2 20* 22*   BUN 23 24*   CREATININE 0.8 0.8   * 108   CALCIUM 9.6 8.6*   ALBUMIN 4.4 4.0   PROT 8.1 7.3   ALKPHOS 86 78   ALT 25 23   AST 21 20   BILITOT 0.9 0.7       INR  Recent Labs     20  1008   INR 1.0           Diagnostic Studies:      EKD Echo:  No results found for this or any previous visit.      Anesthesia Evaluation    I have reviewed the Patient Summary Reports.   I have reviewed the NPO Status.      Review of Systems  Anesthesia Hx:  History of prior surgery of interest to airway management or planning: Denies Family Hx of Anesthesia complications.   Denies Personal Hx of Anesthesia complications.       Physical Exam  General:  Obesity    Airway/Jaw/Neck:  Airway Findings: Mouth Opening: Normal Tongue: Normal  General Airway Assessment: Adult  Mallampati: II  Improves to I with phonation.  TM Distance: Normal, at least 6 cm  Jaw/Neck Findings:  Neck ROM: Normal ROM      Dental:  Dental Findings: Periodontal disease, Mild    Chest/Lungs:  Chest/Lungs Findings: Clear to auscultation, Normal Respiratory Rate         Mental Status:  Mental Status Findings:  Cooperative, Alert and Oriented         Anesthesia Plan  Type of Anesthesia, risks & benefits discussed:  Anesthesia Type:  general  Patient's Preference:   Intra-op Monitoring Plan: standard ASA monitors  Intra-op Monitoring Plan Comments:   Post Op Pain Control Plan: multimodal  analgesia  Post Op Pain Control Plan Comments:   Induction:   IV  Beta Blocker:  Patient is not currently on a Beta-Blocker (No further documentation required).       Informed Consent: Patient understands risks and agrees with Anesthesia plan.  Questions answered. Anesthesia consent signed with patient.  ASA Score: 3     Day of Surgery Review of History & Physical:    H&P update referred to the provider.         Ready For Surgery From Anesthesia Perspective.

## 2021-01-04 ENCOUNTER — TELEPHONE (OUTPATIENT)
Dept: DERMATOLOGY | Facility: CLINIC | Age: 66
End: 2021-01-04

## 2021-01-11 DIAGNOSIS — I35.0 AORTIC STENOSIS, MODERATE: ICD-10-CM

## 2021-01-11 DIAGNOSIS — Z01.818 PRE-TRANSPLANT EVALUATION FOR LIVER TRANSPLANT: Primary | ICD-10-CM

## 2021-01-11 DIAGNOSIS — C22.0 HEPATOCELLULAR CARCINOMA: ICD-10-CM

## 2021-01-12 ENCOUNTER — TELEPHONE (OUTPATIENT)
Dept: TRANSPLANT | Facility: CLINIC | Age: 66
End: 2021-01-12

## 2021-01-13 ENCOUNTER — TELEPHONE (OUTPATIENT)
Dept: TRANSPLANT | Facility: CLINIC | Age: 66
End: 2021-01-13

## 2021-01-13 DIAGNOSIS — C22.0 HEPATOCELLULAR CARCINOMA: ICD-10-CM

## 2021-01-13 DIAGNOSIS — Z01.818 PRE-TRANSPLANT EVALUATION FOR LIVER TRANSPLANT: Primary | ICD-10-CM

## 2021-01-14 ENCOUNTER — TELEPHONE (OUTPATIENT)
Dept: TRANSPLANT | Facility: CLINIC | Age: 66
End: 2021-01-14

## 2021-01-14 NOTE — PROGRESS NOTES
Transplant evaluation scheduled Jan 14th/15th.  Monthly labs and RTC to be scheduled based on LRT  2D echo and Cardiology referral entered with request for consult with Dr. Shelton   A1C added to labs  Will speak with patient about scheduling dental appointment  Referral to Vascular Surgery entered w/request for consult with Dr. Ahumada  Will follow-up on shave biopsy  Will continue to monitor urine compliance per guidelines

## 2021-01-15 ENCOUNTER — OFFICE VISIT (OUTPATIENT)
Dept: TRANSPLANT | Facility: CLINIC | Age: 66
DRG: 815 | End: 2021-01-15
Payer: MEDICARE

## 2021-01-15 ENCOUNTER — HOSPITAL ENCOUNTER (INPATIENT)
Facility: HOSPITAL | Age: 66
LOS: 5 days | Discharge: HOME | DRG: 815 | End: 2021-01-20
Attending: EMERGENCY MEDICINE | Admitting: STUDENT IN AN ORGANIZED HEALTH CARE EDUCATION/TRAINING PROGRAM
Payer: MEDICARE

## 2021-01-15 VITALS
HEART RATE: 95 BPM | OXYGEN SATURATION: 95 % | WEIGHT: 200.5 LBS | TEMPERATURE: 98 F | RESPIRATION RATE: 17 BRPM | DIASTOLIC BLOOD PRESSURE: 75 MMHG | HEIGHT: 72 IN | BODY MASS INDEX: 27.16 KG/M2 | SYSTOLIC BLOOD PRESSURE: 114 MMHG

## 2021-01-15 DIAGNOSIS — E11.9 TYPE 2 DIABETES MELLITUS WITHOUT COMPLICATION, UNSPECIFIED WHETHER LONG TERM INSULIN USE: ICD-10-CM

## 2021-01-15 DIAGNOSIS — K74.60 HEPATIC CIRRHOSIS, UNSPECIFIED HEPATIC CIRRHOSIS TYPE, UNSPECIFIED WHETHER ASCITES PRESENT: ICD-10-CM

## 2021-01-15 DIAGNOSIS — D72.829 LEUKOCYTOSIS, UNSPECIFIED TYPE: ICD-10-CM

## 2021-01-15 DIAGNOSIS — Z01.818 PRE-TRANSPLANT EVALUATION FOR LIVER TRANSPLANT: ICD-10-CM

## 2021-01-15 DIAGNOSIS — R94.31 QT PROLONGATION: ICD-10-CM

## 2021-01-15 DIAGNOSIS — D72.828 OTHER ELEVATED WHITE BLOOD CELL (WBC) COUNT: ICD-10-CM

## 2021-01-15 DIAGNOSIS — C44.90 SKIN CANCER: ICD-10-CM

## 2021-01-15 DIAGNOSIS — D72.829 LEUKOCYTOSIS: ICD-10-CM

## 2021-01-15 DIAGNOSIS — R00.0 TACHYCARDIA: ICD-10-CM

## 2021-01-15 DIAGNOSIS — K76.89 PERIHEPATIC FLUID COLLECTION: Primary | ICD-10-CM

## 2021-01-15 DIAGNOSIS — K75.81 NASH (NONALCOHOLIC STEATOHEPATITIS): ICD-10-CM

## 2021-01-15 DIAGNOSIS — C22.0 HCC (HEPATOCELLULAR CARCINOMA): Primary | ICD-10-CM

## 2021-01-15 DIAGNOSIS — R10.11 RIGHT UPPER QUADRANT ABDOMINAL PAIN: ICD-10-CM

## 2021-01-15 DIAGNOSIS — C24.9 MALIGNANT NEOPLASM OF BILIARY TRACT, UNSPECIFIED: ICD-10-CM

## 2021-01-15 DIAGNOSIS — R16.0 LIVER MASS: ICD-10-CM

## 2021-01-15 DIAGNOSIS — R10.9 ABDOMINAL PAIN, UNSPECIFIED ABDOMINAL LOCATION: ICD-10-CM

## 2021-01-15 DIAGNOSIS — C22.0 HCC (HEPATOCELLULAR CARCINOMA): ICD-10-CM

## 2021-01-15 LAB
BACTERIA #/AREA URNS AUTO: ABNORMAL /HPF
BILIRUB UR QL STRIP: NEGATIVE
CLARITY UR REFRACT.AUTO: ABNORMAL
COLOR UR AUTO: ABNORMAL
CTP QC/QA: YES
GLUCOSE UR QL STRIP: NEGATIVE
HGB UR QL STRIP: NEGATIVE
HYALINE CASTS UR QL AUTO: 14 /LPF
KETONES UR QL STRIP: NEGATIVE
LACTATE SERPL-SCNC: 2.2 MMOL/L (ref 0.5–2.2)
LEUKOCYTE ESTERASE UR QL STRIP: NEGATIVE
MICROSCOPIC COMMENT: ABNORMAL
NITRITE UR QL STRIP: NEGATIVE
PH UR STRIP: 5 [PH] (ref 5–8)
POCT GLUCOSE: 93 MG/DL (ref 70–110)
PROT UR QL STRIP: ABNORMAL
RBC #/AREA URNS AUTO: 1 /HPF (ref 0–4)
SARS-COV-2 RDRP RESP QL NAA+PROBE: NEGATIVE
SP GR UR STRIP: 1.01 (ref 1–1.03)
SQUAMOUS #/AREA URNS AUTO: 0 /HPF
URN SPEC COLLECT METH UR: ABNORMAL
WBC #/AREA URNS AUTO: 4 /HPF (ref 0–5)

## 2021-01-15 PROCEDURE — 99285 EMERGENCY DEPT VISIT HI MDM: CPT | Mod: 25,NTX

## 2021-01-15 PROCEDURE — 99285 PR EMERGENCY DEPT VISIT,LEVEL V: ICD-10-PCS | Mod: NTX,CS,, | Performed by: EMERGENCY MEDICINE

## 2021-01-15 PROCEDURE — 99999 PR PBB SHADOW E&M-EST. PATIENT-LVL V: ICD-10-PCS | Mod: PBBFAC,TXP,, | Performed by: INTERNAL MEDICINE

## 2021-01-15 PROCEDURE — 1125F AMNT PAIN NOTED PAIN PRSNT: CPT | Mod: S$GLB,TXP,, | Performed by: INTERNAL MEDICINE

## 2021-01-15 PROCEDURE — 1125F PR PAIN SEVERITY QUANTIFIED, PAIN PRESENT: ICD-10-PCS | Mod: S$GLB,TXP,, | Performed by: INTERNAL MEDICINE

## 2021-01-15 PROCEDURE — 99215 OFFICE O/P EST HI 40 MIN: CPT | Mod: S$GLB,TXP,, | Performed by: INTERNAL MEDICINE

## 2021-01-15 PROCEDURE — 1159F PR MEDICATION LIST DOCUMENTED IN MEDICAL RECORD: ICD-10-PCS | Mod: S$GLB,TXP,, | Performed by: INTERNAL MEDICINE

## 2021-01-15 PROCEDURE — 1101F PT FALLS ASSESS-DOCD LE1/YR: CPT | Mod: CPTII,S$GLB,TXP, | Performed by: INTERNAL MEDICINE

## 2021-01-15 PROCEDURE — 99223 PR INITIAL HOSPITAL CARE,LEVL III: ICD-10-PCS | Mod: AI,NTX,, | Performed by: PHYSICIAN ASSISTANT

## 2021-01-15 PROCEDURE — 25500020 PHARM REV CODE 255: Mod: NTX | Performed by: EMERGENCY MEDICINE

## 2021-01-15 PROCEDURE — 87040 BLOOD CULTURE FOR BACTERIA: CPT | Mod: 59,NTX

## 2021-01-15 PROCEDURE — 3078F PR MOST RECENT DIASTOLIC BLOOD PRESSURE < 80 MM HG: ICD-10-PCS | Mod: CPTII,S$GLB,TXP, | Performed by: INTERNAL MEDICINE

## 2021-01-15 PROCEDURE — U0002 COVID-19 LAB TEST NON-CDC: HCPCS | Mod: NTX | Performed by: EMERGENCY MEDICINE

## 2021-01-15 PROCEDURE — 99285 EMERGENCY DEPT VISIT HI MDM: CPT | Mod: NTX,CS,, | Performed by: EMERGENCY MEDICINE

## 2021-01-15 PROCEDURE — 25000003 PHARM REV CODE 250: Mod: NTX | Performed by: PHYSICIAN ASSISTANT

## 2021-01-15 PROCEDURE — 1101F PR PT FALLS ASSESS DOC 0-1 FALLS W/OUT INJ PAST YR: ICD-10-PCS | Mod: CPTII,S$GLB,TXP, | Performed by: INTERNAL MEDICINE

## 2021-01-15 PROCEDURE — 3008F PR BODY MASS INDEX (BMI) DOCUMENTED: ICD-10-PCS | Mod: CPTII,S$GLB,TXP, | Performed by: INTERNAL MEDICINE

## 2021-01-15 PROCEDURE — 1159F MED LIST DOCD IN RCRD: CPT | Mod: S$GLB,TXP,, | Performed by: INTERNAL MEDICINE

## 2021-01-15 PROCEDURE — 99223 1ST HOSP IP/OBS HIGH 75: CPT | Mod: AI,NTX,, | Performed by: PHYSICIAN ASSISTANT

## 2021-01-15 PROCEDURE — 25000003 PHARM REV CODE 250: Mod: NTX | Performed by: EMERGENCY MEDICINE

## 2021-01-15 PROCEDURE — 99999 PR PBB SHADOW E&M-EST. PATIENT-LVL V: CPT | Mod: PBBFAC,TXP,, | Performed by: INTERNAL MEDICINE

## 2021-01-15 PROCEDURE — 3288F FALL RISK ASSESSMENT DOCD: CPT | Mod: CPTII,S$GLB,TXP, | Performed by: INTERNAL MEDICINE

## 2021-01-15 PROCEDURE — 3008F BODY MASS INDEX DOCD: CPT | Mod: CPTII,S$GLB,TXP, | Performed by: INTERNAL MEDICINE

## 2021-01-15 PROCEDURE — 3074F PR MOST RECENT SYSTOLIC BLOOD PRESSURE < 130 MM HG: ICD-10-PCS | Mod: CPTII,S$GLB,TXP, | Performed by: INTERNAL MEDICINE

## 2021-01-15 PROCEDURE — 3288F PR FALLS RISK ASSESSMENT DOCUMENTED: ICD-10-PCS | Mod: CPTII,S$GLB,TXP, | Performed by: INTERNAL MEDICINE

## 2021-01-15 PROCEDURE — 63600175 PHARM REV CODE 636 W HCPCS: Mod: NTX | Performed by: PHYSICIAN ASSISTANT

## 2021-01-15 PROCEDURE — 3078F DIAST BP <80 MM HG: CPT | Mod: CPTII,S$GLB,TXP, | Performed by: INTERNAL MEDICINE

## 2021-01-15 PROCEDURE — 99215 PR OFFICE/OUTPT VISIT, EST, LEVL V, 40-54 MIN: ICD-10-PCS | Mod: S$GLB,TXP,, | Performed by: INTERNAL MEDICINE

## 2021-01-15 PROCEDURE — 83605 ASSAY OF LACTIC ACID: CPT | Mod: NTX

## 2021-01-15 PROCEDURE — 20600001 HC STEP DOWN PRIVATE ROOM: Mod: NTX

## 2021-01-15 PROCEDURE — 81001 URINALYSIS AUTO W/SCOPE: CPT | Mod: NTX

## 2021-01-15 PROCEDURE — 3074F SYST BP LT 130 MM HG: CPT | Mod: CPTII,S$GLB,TXP, | Performed by: INTERNAL MEDICINE

## 2021-01-15 RX ORDER — ACETAMINOPHEN 325 MG/1
650 TABLET ORAL EVERY 8 HOURS PRN
Status: DISCONTINUED | OUTPATIENT
Start: 2021-01-15 | End: 2021-01-20 | Stop reason: HOSPADM

## 2021-01-15 RX ORDER — BISACODYL 5 MG
5 TABLET, DELAYED RELEASE (ENTERIC COATED) ORAL DAILY PRN
Status: DISCONTINUED | OUTPATIENT
Start: 2021-01-15 | End: 2021-01-20 | Stop reason: HOSPADM

## 2021-01-15 RX ORDER — ASPIRIN 81 MG/1
81 TABLET ORAL DAILY
Status: DISCONTINUED | OUTPATIENT
Start: 2021-01-16 | End: 2021-01-20 | Stop reason: HOSPADM

## 2021-01-15 RX ORDER — GLUCAGON 1 MG
1 KIT INJECTION
Status: DISCONTINUED | OUTPATIENT
Start: 2021-01-15 | End: 2021-01-20 | Stop reason: HOSPADM

## 2021-01-15 RX ORDER — GABAPENTIN 300 MG/1
300 CAPSULE ORAL 3 TIMES DAILY
Status: DISCONTINUED | OUTPATIENT
Start: 2021-01-15 | End: 2021-01-20 | Stop reason: HOSPADM

## 2021-01-15 RX ORDER — LACTULOSE 10 G/15ML
30 SOLUTION ORAL; RECTAL 3 TIMES DAILY
Status: ON HOLD | COMMUNITY
End: 2022-01-13 | Stop reason: HOSPADM

## 2021-01-15 RX ORDER — ONDANSETRON 8 MG/1
8 TABLET, ORALLY DISINTEGRATING ORAL EVERY 8 HOURS PRN
Status: DISCONTINUED | OUTPATIENT
Start: 2021-01-15 | End: 2021-01-20 | Stop reason: HOSPADM

## 2021-01-15 RX ORDER — CEFEPIME HYDROCHLORIDE 1 G/1
1 INJECTION, POWDER, FOR SOLUTION INTRAMUSCULAR; INTRAVENOUS
Status: DISCONTINUED | OUTPATIENT
Start: 2021-01-15 | End: 2021-01-19

## 2021-01-15 RX ORDER — IBUPROFEN 200 MG
24 TABLET ORAL
Status: DISCONTINUED | OUTPATIENT
Start: 2021-01-15 | End: 2021-01-20 | Stop reason: HOSPADM

## 2021-01-15 RX ORDER — INSULIN ASPART 100 [IU]/ML
0-5 INJECTION, SOLUTION INTRAVENOUS; SUBCUTANEOUS
Status: DISCONTINUED | OUTPATIENT
Start: 2021-01-15 | End: 2021-01-20 | Stop reason: HOSPADM

## 2021-01-15 RX ORDER — BISACODYL 10 MG
10 SUPPOSITORY, RECTAL RECTAL DAILY PRN
Status: DISCONTINUED | OUTPATIENT
Start: 2021-01-15 | End: 2021-01-20 | Stop reason: HOSPADM

## 2021-01-15 RX ORDER — TRAZODONE HYDROCHLORIDE 50 MG/1
50 TABLET ORAL NIGHTLY
Status: DISCONTINUED | OUTPATIENT
Start: 2021-01-15 | End: 2021-01-20 | Stop reason: HOSPADM

## 2021-01-15 RX ORDER — IBUPROFEN 200 MG
16 TABLET ORAL
Status: DISCONTINUED | OUTPATIENT
Start: 2021-01-15 | End: 2021-01-20 | Stop reason: HOSPADM

## 2021-01-15 RX ORDER — TRAZODONE HYDROCHLORIDE 50 MG/1
50 TABLET ORAL NIGHTLY PRN
Qty: 60 TABLET | Refills: 11 | Status: ON HOLD | OUTPATIENT
Start: 2021-01-15 | End: 2021-01-20 | Stop reason: HOSPADM

## 2021-01-15 RX ORDER — LACTULOSE 10 G/15ML
30 SOLUTION ORAL 3 TIMES DAILY
Status: DISCONTINUED | OUTPATIENT
Start: 2021-01-15 | End: 2021-01-20 | Stop reason: HOSPADM

## 2021-01-15 RX ORDER — AMLODIPINE BESYLATE 5 MG/1
5 TABLET ORAL DAILY
Status: DISCONTINUED | OUTPATIENT
Start: 2021-01-16 | End: 2021-01-20 | Stop reason: HOSPADM

## 2021-01-15 RX ORDER — DOCUSATE SODIUM 100 MG/1
100 CAPSULE, LIQUID FILLED ORAL 2 TIMES DAILY
Status: DISCONTINUED | OUTPATIENT
Start: 2021-01-15 | End: 2021-01-20 | Stop reason: HOSPADM

## 2021-01-15 RX ORDER — SODIUM CHLORIDE 0.9 % (FLUSH) 0.9 %
10 SYRINGE (ML) INJECTION
Status: DISCONTINUED | OUTPATIENT
Start: 2021-01-15 | End: 2021-01-20 | Stop reason: HOSPADM

## 2021-01-15 RX ORDER — POLYETHYLENE GLYCOL 3350 17 G/17G
17 POWDER, FOR SOLUTION ORAL DAILY
Status: DISCONTINUED | OUTPATIENT
Start: 2021-01-16 | End: 2021-01-20 | Stop reason: HOSPADM

## 2021-01-15 RX ADMIN — VANCOMYCIN HYDROCHLORIDE 1750 MG: 750 INJECTION, POWDER, LYOPHILIZED, FOR SOLUTION INTRAVENOUS at 11:01

## 2021-01-15 RX ADMIN — CEFEPIME 1 G: 1 INJECTION, POWDER, FOR SOLUTION INTRAMUSCULAR; INTRAVENOUS at 09:01

## 2021-01-15 RX ADMIN — IOHEXOL 100 ML: 350 INJECTION, SOLUTION INTRAVENOUS at 08:01

## 2021-01-15 RX ADMIN — GABAPENTIN 300 MG: 300 CAPSULE ORAL at 09:01

## 2021-01-15 RX ADMIN — TRAZODONE HYDROCHLORIDE 50 MG: 50 TABLET ORAL at 11:01

## 2021-01-15 RX ADMIN — RIFAXIMIN 550 MG: 550 TABLET ORAL at 09:01

## 2021-01-15 RX ADMIN — SODIUM CHLORIDE 500 ML: 0.9 INJECTION, SOLUTION INTRAVENOUS at 07:01

## 2021-01-15 RX ADMIN — DOCUSATE SODIUM 100 MG: 100 CAPSULE, LIQUID FILLED ORAL at 09:01

## 2021-01-16 LAB
ALBUMIN SERPL BCP-MCNC: 2.7 G/DL (ref 3.5–5.2)
ALP SERPL-CCNC: 131 U/L (ref 55–135)
ALT SERPL W/O P-5'-P-CCNC: 26 U/L (ref 10–44)
ANION GAP SERPL CALC-SCNC: 9 MMOL/L (ref 8–16)
AST SERPL-CCNC: 21 U/L (ref 10–40)
BASOPHILS # BLD AUTO: 0.07 K/UL (ref 0–0.2)
BASOPHILS NFR BLD: 0.9 % (ref 0–1.9)
BILIRUB SERPL-MCNC: 0.4 MG/DL (ref 0.1–1)
BUN SERPL-MCNC: 24 MG/DL (ref 8–23)
CALCIUM SERPL-MCNC: 8.4 MG/DL (ref 8.7–10.5)
CHLORIDE SERPL-SCNC: 98 MMOL/L (ref 95–110)
CO2 SERPL-SCNC: 23 MMOL/L (ref 23–29)
CREAT SERPL-MCNC: 0.8 MG/DL (ref 0.5–1.4)
DIFFERENTIAL METHOD: ABNORMAL
EOSINOPHIL # BLD AUTO: 0.1 K/UL (ref 0–0.5)
EOSINOPHIL NFR BLD: 1 % (ref 0–8)
ERYTHROCYTE [DISTWIDTH] IN BLOOD BY AUTOMATED COUNT: 13.6 % (ref 11.5–14.5)
EST. GFR  (AFRICAN AMERICAN): >60 ML/MIN/1.73 M^2
EST. GFR  (NON AFRICAN AMERICAN): >60 ML/MIN/1.73 M^2
GLUCOSE SERPL-MCNC: 84 MG/DL (ref 70–110)
HCT VFR BLD AUTO: 37.7 % (ref 40–54)
HGB BLD-MCNC: 11.7 G/DL (ref 14–18)
IMM GRANULOCYTES # BLD AUTO: 0.05 K/UL (ref 0–0.04)
IMM GRANULOCYTES NFR BLD AUTO: 0.6 % (ref 0–0.5)
INR PPP: 1.1 (ref 0.8–1.2)
LYMPHOCYTES # BLD AUTO: 1.2 K/UL (ref 1–4.8)
LYMPHOCYTES NFR BLD: 14.1 % (ref 18–48)
MAGNESIUM SERPL-MCNC: 1.5 MG/DL (ref 1.6–2.6)
MCH RBC QN AUTO: 29 PG (ref 27–31)
MCHC RBC AUTO-ENTMCNC: 31 G/DL (ref 32–36)
MCV RBC AUTO: 94 FL (ref 82–98)
MONOCYTES # BLD AUTO: 1.1 K/UL (ref 0.3–1)
MONOCYTES NFR BLD: 13.1 % (ref 4–15)
NEUTROPHILS # BLD AUTO: 5.7 K/UL (ref 1.8–7.7)
NEUTROPHILS NFR BLD: 70.3 % (ref 38–73)
NRBC BLD-RTO: 0 /100 WBC
PHOSPHATE SERPL-MCNC: 4.1 MG/DL (ref 2.7–4.5)
PLATELET # BLD AUTO: 277 K/UL (ref 150–350)
PMV BLD AUTO: 11 FL (ref 9.2–12.9)
POCT GLUCOSE: 115 MG/DL (ref 70–110)
POCT GLUCOSE: 151 MG/DL (ref 70–110)
POCT GLUCOSE: 203 MG/DL (ref 70–110)
POCT GLUCOSE: 210 MG/DL (ref 70–110)
POTASSIUM SERPL-SCNC: 4 MMOL/L (ref 3.5–5.1)
PROT SERPL-MCNC: 6.6 G/DL (ref 6–8.4)
PROTHROMBIN TIME: 11.7 SEC (ref 9–12.5)
RBC # BLD AUTO: 4.03 M/UL (ref 4.6–6.2)
SODIUM SERPL-SCNC: 130 MMOL/L (ref 136–145)
WBC # BLD AUTO: 8.15 K/UL (ref 3.9–12.7)

## 2021-01-16 PROCEDURE — 25000003 PHARM REV CODE 250: Mod: NTX | Performed by: PHYSICIAN ASSISTANT

## 2021-01-16 PROCEDURE — 85610 PROTHROMBIN TIME: CPT | Mod: NTX

## 2021-01-16 PROCEDURE — 20600001 HC STEP DOWN PRIVATE ROOM: Mod: NTX

## 2021-01-16 PROCEDURE — 36415 COLL VENOUS BLD VENIPUNCTURE: CPT | Mod: NTX

## 2021-01-16 PROCEDURE — 99232 SBSQ HOSP IP/OBS MODERATE 35: CPT | Mod: NTX,,, | Performed by: INTERNAL MEDICINE

## 2021-01-16 PROCEDURE — 84100 ASSAY OF PHOSPHORUS: CPT | Mod: NTX

## 2021-01-16 PROCEDURE — 83735 ASSAY OF MAGNESIUM: CPT | Mod: NTX

## 2021-01-16 PROCEDURE — 80053 COMPREHEN METABOLIC PANEL: CPT | Mod: NTX

## 2021-01-16 PROCEDURE — 85025 COMPLETE CBC W/AUTO DIFF WBC: CPT | Mod: NTX

## 2021-01-16 PROCEDURE — 25000003 PHARM REV CODE 250: Mod: NTX | Performed by: EMERGENCY MEDICINE

## 2021-01-16 PROCEDURE — 99232 PR SUBSEQUENT HOSPITAL CARE,LEVL II: ICD-10-PCS | Mod: NTX,,, | Performed by: INTERNAL MEDICINE

## 2021-01-16 PROCEDURE — 63600175 PHARM REV CODE 636 W HCPCS: Mod: NTX | Performed by: PHYSICIAN ASSISTANT

## 2021-01-16 PROCEDURE — 63600175 PHARM REV CODE 636 W HCPCS: Mod: NTX | Performed by: EMERGENCY MEDICINE

## 2021-01-16 RX ADMIN — RIFAXIMIN 550 MG: 550 TABLET ORAL at 08:01

## 2021-01-16 RX ADMIN — DOCUSATE SODIUM 100 MG: 100 CAPSULE, LIQUID FILLED ORAL at 08:01

## 2021-01-16 RX ADMIN — CEFEPIME 1 G: 1 INJECTION, POWDER, FOR SOLUTION INTRAMUSCULAR; INTRAVENOUS at 05:01

## 2021-01-16 RX ADMIN — VANCOMYCIN HYDROCHLORIDE 1250 MG: 1.25 INJECTION, POWDER, LYOPHILIZED, FOR SOLUTION INTRAVENOUS at 08:01

## 2021-01-16 RX ADMIN — TRAZODONE HYDROCHLORIDE 50 MG: 50 TABLET ORAL at 08:01

## 2021-01-16 RX ADMIN — CEFEPIME 1 G: 1 INJECTION, POWDER, FOR SOLUTION INTRAMUSCULAR; INTRAVENOUS at 08:01

## 2021-01-16 RX ADMIN — LACTULOSE 30 G: 20 SOLUTION ORAL at 04:01

## 2021-01-16 RX ADMIN — POLYETHYLENE GLYCOL 3350 17 G: 17 POWDER, FOR SOLUTION ORAL at 08:01

## 2021-01-16 RX ADMIN — ASPIRIN 81 MG: 81 TABLET, COATED ORAL at 09:01

## 2021-01-16 RX ADMIN — GABAPENTIN 300 MG: 300 CAPSULE ORAL at 08:01

## 2021-01-16 RX ADMIN — LACTULOSE 30 G: 20 SOLUTION ORAL at 08:01

## 2021-01-16 RX ADMIN — CEFEPIME 1 G: 1 INJECTION, POWDER, FOR SOLUTION INTRAMUSCULAR; INTRAVENOUS at 12:01

## 2021-01-16 RX ADMIN — INSULIN ASPART 1 UNITS: 100 INJECTION, SOLUTION INTRAVENOUS; SUBCUTANEOUS at 08:01

## 2021-01-16 RX ADMIN — AMLODIPINE BESYLATE 5 MG: 5 TABLET ORAL at 08:01

## 2021-01-16 RX ADMIN — GABAPENTIN 300 MG: 300 CAPSULE ORAL at 04:01

## 2021-01-17 LAB
ALBUMIN SERPL BCP-MCNC: 2.9 G/DL (ref 3.5–5.2)
ALP SERPL-CCNC: 145 U/L (ref 55–135)
ALT SERPL W/O P-5'-P-CCNC: 28 U/L (ref 10–44)
ANION GAP SERPL CALC-SCNC: 9 MMOL/L (ref 8–16)
AST SERPL-CCNC: 22 U/L (ref 10–40)
BASOPHILS # BLD AUTO: 0.06 K/UL (ref 0–0.2)
BASOPHILS NFR BLD: 0.7 % (ref 0–1.9)
BILIRUB SERPL-MCNC: 0.4 MG/DL (ref 0.1–1)
BUN SERPL-MCNC: 17 MG/DL (ref 8–23)
CALCIUM SERPL-MCNC: 9.3 MG/DL (ref 8.7–10.5)
CHLORIDE SERPL-SCNC: 101 MMOL/L (ref 95–110)
CO2 SERPL-SCNC: 26 MMOL/L (ref 23–29)
CREAT SERPL-MCNC: 0.7 MG/DL (ref 0.5–1.4)
DIFFERENTIAL METHOD: ABNORMAL
EOSINOPHIL # BLD AUTO: 0.1 K/UL (ref 0–0.5)
EOSINOPHIL NFR BLD: 1 % (ref 0–8)
ERYTHROCYTE [DISTWIDTH] IN BLOOD BY AUTOMATED COUNT: 13.6 % (ref 11.5–14.5)
EST. GFR  (AFRICAN AMERICAN): >60 ML/MIN/1.73 M^2
EST. GFR  (NON AFRICAN AMERICAN): >60 ML/MIN/1.73 M^2
GLUCOSE SERPL-MCNC: 129 MG/DL (ref 70–110)
HCT VFR BLD AUTO: 37.9 % (ref 40–54)
HGB BLD-MCNC: 12.4 G/DL (ref 14–18)
IMM GRANULOCYTES # BLD AUTO: 0.06 K/UL (ref 0–0.04)
IMM GRANULOCYTES NFR BLD AUTO: 0.7 % (ref 0–0.5)
INR PPP: 1.1 (ref 0.8–1.2)
LYMPHOCYTES # BLD AUTO: 1.2 K/UL (ref 1–4.8)
LYMPHOCYTES NFR BLD: 14.4 % (ref 18–48)
MAGNESIUM SERPL-MCNC: 1.7 MG/DL (ref 1.6–2.6)
MCH RBC QN AUTO: 29.7 PG (ref 27–31)
MCHC RBC AUTO-ENTMCNC: 32.7 G/DL (ref 32–36)
MCV RBC AUTO: 91 FL (ref 82–98)
MONOCYTES # BLD AUTO: 1 K/UL (ref 0.3–1)
MONOCYTES NFR BLD: 12.4 % (ref 4–15)
NEUTROPHILS # BLD AUTO: 6 K/UL (ref 1.8–7.7)
NEUTROPHILS NFR BLD: 70.8 % (ref 38–73)
NRBC BLD-RTO: 0 /100 WBC
PHOSPHATE SERPL-MCNC: 3.6 MG/DL (ref 2.7–4.5)
PLATELET # BLD AUTO: 265 K/UL (ref 150–350)
PMV BLD AUTO: 10.6 FL (ref 9.2–12.9)
POCT GLUCOSE: 160 MG/DL (ref 70–110)
POCT GLUCOSE: 181 MG/DL (ref 70–110)
POCT GLUCOSE: 190 MG/DL (ref 70–110)
POCT GLUCOSE: 228 MG/DL (ref 70–110)
POTASSIUM SERPL-SCNC: 4.2 MMOL/L (ref 3.5–5.1)
PROT SERPL-MCNC: 6.9 G/DL (ref 6–8.4)
PROTHROMBIN TIME: 11.6 SEC (ref 9–12.5)
RBC # BLD AUTO: 4.17 M/UL (ref 4.6–6.2)
SODIUM SERPL-SCNC: 136 MMOL/L (ref 136–145)
VANCOMYCIN SERPL-MCNC: 11.8 UG/ML
WBC # BLD AUTO: 8.41 K/UL (ref 3.9–12.7)

## 2021-01-17 PROCEDURE — 25000003 PHARM REV CODE 250: Mod: NTX | Performed by: PHYSICIAN ASSISTANT

## 2021-01-17 PROCEDURE — 84100 ASSAY OF PHOSPHORUS: CPT | Mod: NTX

## 2021-01-17 PROCEDURE — 85025 COMPLETE CBC W/AUTO DIFF WBC: CPT | Mod: NTX

## 2021-01-17 PROCEDURE — 80053 COMPREHEN METABOLIC PANEL: CPT | Mod: NTX

## 2021-01-17 PROCEDURE — 80202 ASSAY OF VANCOMYCIN: CPT | Mod: NTX

## 2021-01-17 PROCEDURE — 63600175 PHARM REV CODE 636 W HCPCS: Mod: NTX | Performed by: EMERGENCY MEDICINE

## 2021-01-17 PROCEDURE — 85610 PROTHROMBIN TIME: CPT | Mod: NTX

## 2021-01-17 PROCEDURE — 36415 COLL VENOUS BLD VENIPUNCTURE: CPT | Mod: NTX

## 2021-01-17 PROCEDURE — 63600175 PHARM REV CODE 636 W HCPCS: Mod: NTX | Performed by: PHYSICIAN ASSISTANT

## 2021-01-17 PROCEDURE — 99232 PR SUBSEQUENT HOSPITAL CARE,LEVL II: ICD-10-PCS | Mod: NTX,,, | Performed by: INTERNAL MEDICINE

## 2021-01-17 PROCEDURE — 20600001 HC STEP DOWN PRIVATE ROOM: Mod: NTX

## 2021-01-17 PROCEDURE — 99232 SBSQ HOSP IP/OBS MODERATE 35: CPT | Mod: NTX,,, | Performed by: INTERNAL MEDICINE

## 2021-01-17 PROCEDURE — 83735 ASSAY OF MAGNESIUM: CPT | Mod: NTX

## 2021-01-17 RX ADMIN — GABAPENTIN 300 MG: 300 CAPSULE ORAL at 02:01

## 2021-01-17 RX ADMIN — VANCOMYCIN HYDROCHLORIDE 1250 MG: 1.25 INJECTION, POWDER, LYOPHILIZED, FOR SOLUTION INTRAVENOUS at 08:01

## 2021-01-17 RX ADMIN — GABAPENTIN 300 MG: 300 CAPSULE ORAL at 08:01

## 2021-01-17 RX ADMIN — ASPIRIN 81 MG: 81 TABLET, COATED ORAL at 08:01

## 2021-01-17 RX ADMIN — LACTULOSE 30 G: 20 SOLUTION ORAL at 08:01

## 2021-01-17 RX ADMIN — DOCUSATE SODIUM 100 MG: 100 CAPSULE, LIQUID FILLED ORAL at 08:01

## 2021-01-17 RX ADMIN — RIFAXIMIN 550 MG: 550 TABLET ORAL at 08:01

## 2021-01-17 RX ADMIN — CEFEPIME 1 G: 1 INJECTION, POWDER, FOR SOLUTION INTRAMUSCULAR; INTRAVENOUS at 01:01

## 2021-01-17 RX ADMIN — POLYETHYLENE GLYCOL 3350 17 G: 17 POWDER, FOR SOLUTION ORAL at 08:01

## 2021-01-17 RX ADMIN — TRAZODONE HYDROCHLORIDE 50 MG: 50 TABLET ORAL at 08:01

## 2021-01-17 RX ADMIN — LACTULOSE 30 G: 20 SOLUTION ORAL at 02:01

## 2021-01-17 RX ADMIN — INSULIN ASPART 2 UNITS: 100 INJECTION, SOLUTION INTRAVENOUS; SUBCUTANEOUS at 12:01

## 2021-01-17 RX ADMIN — CEFEPIME 1 G: 1 INJECTION, POWDER, FOR SOLUTION INTRAMUSCULAR; INTRAVENOUS at 08:01

## 2021-01-17 RX ADMIN — CEFEPIME 1 G: 1 INJECTION, POWDER, FOR SOLUTION INTRAMUSCULAR; INTRAVENOUS at 04:01

## 2021-01-17 RX ADMIN — AMLODIPINE BESYLATE 5 MG: 5 TABLET ORAL at 08:01

## 2021-01-18 LAB
ALBUMIN SERPL BCP-MCNC: 2.9 G/DL (ref 3.5–5.2)
ALP SERPL-CCNC: 138 U/L (ref 55–135)
ALT SERPL W/O P-5'-P-CCNC: 31 U/L (ref 10–44)
ANION GAP SERPL CALC-SCNC: 10 MMOL/L (ref 8–16)
AST SERPL-CCNC: 24 U/L (ref 10–40)
BASOPHILS # BLD AUTO: 0.08 K/UL (ref 0–0.2)
BASOPHILS NFR BLD: 0.9 % (ref 0–1.9)
BILIRUB SERPL-MCNC: 0.5 MG/DL (ref 0.1–1)
BUN SERPL-MCNC: 16 MG/DL (ref 8–23)
CALCIUM SERPL-MCNC: 8.8 MG/DL (ref 8.7–10.5)
CHLORIDE SERPL-SCNC: 102 MMOL/L (ref 95–110)
CO2 SERPL-SCNC: 24 MMOL/L (ref 23–29)
CREAT SERPL-MCNC: 0.7 MG/DL (ref 0.5–1.4)
DIFFERENTIAL METHOD: ABNORMAL
EOSINOPHIL # BLD AUTO: 0.1 K/UL (ref 0–0.5)
EOSINOPHIL NFR BLD: 1.2 % (ref 0–8)
ERYTHROCYTE [DISTWIDTH] IN BLOOD BY AUTOMATED COUNT: 13.4 % (ref 11.5–14.5)
EST. GFR  (AFRICAN AMERICAN): >60 ML/MIN/1.73 M^2
EST. GFR  (NON AFRICAN AMERICAN): >60 ML/MIN/1.73 M^2
GLUCOSE SERPL-MCNC: 134 MG/DL (ref 70–110)
HCT VFR BLD AUTO: 38.3 % (ref 40–54)
HGB BLD-MCNC: 12.2 G/DL (ref 14–18)
IMM GRANULOCYTES # BLD AUTO: 0.06 K/UL (ref 0–0.04)
IMM GRANULOCYTES NFR BLD AUTO: 0.7 % (ref 0–0.5)
INR PPP: 1.1 (ref 0.8–1.2)
LYMPHOCYTES # BLD AUTO: 1.2 K/UL (ref 1–4.8)
LYMPHOCYTES NFR BLD: 13.5 % (ref 18–48)
MAGNESIUM SERPL-MCNC: 1.5 MG/DL (ref 1.6–2.6)
MCH RBC QN AUTO: 29.5 PG (ref 27–31)
MCHC RBC AUTO-ENTMCNC: 31.9 G/DL (ref 32–36)
MCV RBC AUTO: 93 FL (ref 82–98)
MONOCYTES # BLD AUTO: 1 K/UL (ref 0.3–1)
MONOCYTES NFR BLD: 11.5 % (ref 4–15)
NEUTROPHILS # BLD AUTO: 6.6 K/UL (ref 1.8–7.7)
NEUTROPHILS NFR BLD: 72.2 % (ref 38–73)
NRBC BLD-RTO: 0 /100 WBC
PHOSPHATE SERPL-MCNC: 3.1 MG/DL (ref 2.7–4.5)
PLATELET # BLD AUTO: 250 K/UL (ref 150–350)
PMV BLD AUTO: 10.9 FL (ref 9.2–12.9)
POCT GLUCOSE: 183 MG/DL (ref 70–110)
POCT GLUCOSE: 194 MG/DL (ref 70–110)
POCT GLUCOSE: 212 MG/DL (ref 70–110)
POTASSIUM SERPL-SCNC: 3.9 MMOL/L (ref 3.5–5.1)
PROT SERPL-MCNC: 6.8 G/DL (ref 6–8.4)
PROTHROMBIN TIME: 11.7 SEC (ref 9–12.5)
RBC # BLD AUTO: 4.14 M/UL (ref 4.6–6.2)
SODIUM SERPL-SCNC: 136 MMOL/L (ref 136–145)
WBC # BLD AUTO: 9.07 K/UL (ref 3.9–12.7)

## 2021-01-18 PROCEDURE — 25500020 PHARM REV CODE 255: Mod: NTX | Performed by: STUDENT IN AN ORGANIZED HEALTH CARE EDUCATION/TRAINING PROGRAM

## 2021-01-18 PROCEDURE — 63600175 PHARM REV CODE 636 W HCPCS: Mod: NTX | Performed by: PHYSICIAN ASSISTANT

## 2021-01-18 PROCEDURE — 83735 ASSAY OF MAGNESIUM: CPT | Mod: NTX

## 2021-01-18 PROCEDURE — 84100 ASSAY OF PHOSPHORUS: CPT | Mod: NTX

## 2021-01-18 PROCEDURE — 85025 COMPLETE CBC W/AUTO DIFF WBC: CPT | Mod: NTX

## 2021-01-18 PROCEDURE — 36415 COLL VENOUS BLD VENIPUNCTURE: CPT | Mod: NTX

## 2021-01-18 PROCEDURE — 85610 PROTHROMBIN TIME: CPT | Mod: NTX

## 2021-01-18 PROCEDURE — 99233 SBSQ HOSP IP/OBS HIGH 50: CPT | Mod: GC,NTX,, | Performed by: INTERNAL MEDICINE

## 2021-01-18 PROCEDURE — 25000003 PHARM REV CODE 250: Mod: NTX | Performed by: PHYSICIAN ASSISTANT

## 2021-01-18 PROCEDURE — 80053 COMPREHEN METABOLIC PANEL: CPT | Mod: NTX

## 2021-01-18 PROCEDURE — 99233 PR SUBSEQUENT HOSPITAL CARE,LEVL III: ICD-10-PCS | Mod: GC,NTX,, | Performed by: INTERNAL MEDICINE

## 2021-01-18 PROCEDURE — 20600001 HC STEP DOWN PRIVATE ROOM: Mod: NTX

## 2021-01-18 PROCEDURE — 63600175 PHARM REV CODE 636 W HCPCS: Mod: NTX | Performed by: EMERGENCY MEDICINE

## 2021-01-18 RX ADMIN — GABAPENTIN 300 MG: 300 CAPSULE ORAL at 08:01

## 2021-01-18 RX ADMIN — DOCUSATE SODIUM 100 MG: 100 CAPSULE, LIQUID FILLED ORAL at 08:01

## 2021-01-18 RX ADMIN — CEFEPIME 1 G: 1 INJECTION, POWDER, FOR SOLUTION INTRAMUSCULAR; INTRAVENOUS at 09:01

## 2021-01-18 RX ADMIN — GABAPENTIN 300 MG: 300 CAPSULE ORAL at 02:01

## 2021-01-18 RX ADMIN — VANCOMYCIN HYDROCHLORIDE 1250 MG: 1.25 INJECTION, POWDER, LYOPHILIZED, FOR SOLUTION INTRAVENOUS at 09:01

## 2021-01-18 RX ADMIN — VANCOMYCIN HYDROCHLORIDE 1250 MG: 1.25 INJECTION, POWDER, LYOPHILIZED, FOR SOLUTION INTRAVENOUS at 08:01

## 2021-01-18 RX ADMIN — ASPIRIN 81 MG: 81 TABLET, COATED ORAL at 08:01

## 2021-01-18 RX ADMIN — CEFEPIME 1 G: 1 INJECTION, POWDER, FOR SOLUTION INTRAMUSCULAR; INTRAVENOUS at 02:01

## 2021-01-18 RX ADMIN — LACTULOSE 30 G: 20 SOLUTION ORAL at 02:01

## 2021-01-18 RX ADMIN — RIFAXIMIN 550 MG: 550 TABLET ORAL at 08:01

## 2021-01-18 RX ADMIN — TRAZODONE HYDROCHLORIDE 50 MG: 50 TABLET ORAL at 09:01

## 2021-01-18 RX ADMIN — LACTULOSE 30 G: 20 SOLUTION ORAL at 08:01

## 2021-01-18 RX ADMIN — IOHEXOL 75 ML: 350 INJECTION, SOLUTION INTRAVENOUS at 12:01

## 2021-01-18 RX ADMIN — GABAPENTIN 300 MG: 300 CAPSULE ORAL at 09:01

## 2021-01-18 RX ADMIN — INSULIN ASPART 2 UNITS: 100 INJECTION, SOLUTION INTRAVENOUS; SUBCUTANEOUS at 05:01

## 2021-01-18 RX ADMIN — RIFAXIMIN 550 MG: 550 TABLET ORAL at 09:01

## 2021-01-18 RX ADMIN — AMLODIPINE BESYLATE 5 MG: 5 TABLET ORAL at 08:01

## 2021-01-18 RX ADMIN — CEFEPIME 1 G: 1 INJECTION, POWDER, FOR SOLUTION INTRAMUSCULAR; INTRAVENOUS at 05:01

## 2021-01-18 RX ADMIN — POLYETHYLENE GLYCOL 3350 17 G: 17 POWDER, FOR SOLUTION ORAL at 08:01

## 2021-01-19 ENCOUNTER — CONFERENCE (OUTPATIENT)
Dept: TRANSPLANT | Facility: CLINIC | Age: 66
End: 2021-01-19

## 2021-01-19 ENCOUNTER — TELEPHONE (OUTPATIENT)
Dept: TRANSPLANT | Facility: HOSPITAL | Age: 66
End: 2021-01-19

## 2021-01-19 PROBLEM — E11.9 TYPE 2 DIABETES MELLITUS: Status: ACTIVE | Noted: 2021-01-19

## 2021-01-19 LAB
ALBUMIN SERPL BCP-MCNC: 2.8 G/DL (ref 3.5–5.2)
ALP SERPL-CCNC: 139 U/L (ref 55–135)
ALT SERPL W/O P-5'-P-CCNC: 36 U/L (ref 10–44)
ANION GAP SERPL CALC-SCNC: 9 MMOL/L (ref 8–16)
AST SERPL-CCNC: 26 U/L (ref 10–40)
BASOPHILS # BLD AUTO: 0.06 K/UL (ref 0–0.2)
BASOPHILS NFR BLD: 0.6 % (ref 0–1.9)
BILIRUB SERPL-MCNC: 0.3 MG/DL (ref 0.1–1)
BUN SERPL-MCNC: 14 MG/DL (ref 8–23)
CALCIUM SERPL-MCNC: 8.6 MG/DL (ref 8.7–10.5)
CHLORIDE SERPL-SCNC: 102 MMOL/L (ref 95–110)
CO2 SERPL-SCNC: 23 MMOL/L (ref 23–29)
CREAT SERPL-MCNC: 0.8 MG/DL (ref 0.5–1.4)
DIFFERENTIAL METHOD: ABNORMAL
EOSINOPHIL # BLD AUTO: 0.1 K/UL (ref 0–0.5)
EOSINOPHIL NFR BLD: 1.2 % (ref 0–8)
ERYTHROCYTE [DISTWIDTH] IN BLOOD BY AUTOMATED COUNT: 13.2 % (ref 11.5–14.5)
EST. GFR  (AFRICAN AMERICAN): >60 ML/MIN/1.73 M^2
EST. GFR  (NON AFRICAN AMERICAN): >60 ML/MIN/1.73 M^2
GLUCOSE SERPL-MCNC: 272 MG/DL (ref 70–110)
HCT VFR BLD AUTO: 36.9 % (ref 40–54)
HGB BLD-MCNC: 11.8 G/DL (ref 14–18)
IMM GRANULOCYTES # BLD AUTO: 0.08 K/UL (ref 0–0.04)
IMM GRANULOCYTES NFR BLD AUTO: 0.8 % (ref 0–0.5)
INR PPP: 1.1 (ref 0.8–1.2)
LYMPHOCYTES # BLD AUTO: 0.8 K/UL (ref 1–4.8)
LYMPHOCYTES NFR BLD: 7.8 % (ref 18–48)
MAGNESIUM SERPL-MCNC: 1.6 MG/DL (ref 1.6–2.6)
MCH RBC QN AUTO: 29.5 PG (ref 27–31)
MCHC RBC AUTO-ENTMCNC: 32 G/DL (ref 32–36)
MCV RBC AUTO: 92 FL (ref 82–98)
MONOCYTES # BLD AUTO: 1.1 K/UL (ref 0.3–1)
MONOCYTES NFR BLD: 10.7 % (ref 4–15)
NEUTROPHILS # BLD AUTO: 7.9 K/UL (ref 1.8–7.7)
NEUTROPHILS NFR BLD: 78.9 % (ref 38–73)
NRBC BLD-RTO: 0 /100 WBC
PHOSPHATE SERPL-MCNC: 2.6 MG/DL (ref 2.7–4.5)
PLATELET # BLD AUTO: 198 K/UL (ref 150–350)
PMV BLD AUTO: 11.1 FL (ref 9.2–12.9)
POCT GLUCOSE: 176 MG/DL (ref 70–110)
POCT GLUCOSE: 212 MG/DL (ref 70–110)
POCT GLUCOSE: 247 MG/DL (ref 70–110)
POCT GLUCOSE: 261 MG/DL (ref 70–110)
POTASSIUM SERPL-SCNC: 3.5 MMOL/L (ref 3.5–5.1)
PROT SERPL-MCNC: 6.8 G/DL (ref 6–8.4)
PROTHROMBIN TIME: 11.6 SEC (ref 9–12.5)
RBC # BLD AUTO: 4 M/UL (ref 4.6–6.2)
SODIUM SERPL-SCNC: 134 MMOL/L (ref 136–145)
WBC # BLD AUTO: 10.06 K/UL (ref 3.9–12.7)

## 2021-01-19 PROCEDURE — 25000003 PHARM REV CODE 250: Mod: NTX | Performed by: STUDENT IN AN ORGANIZED HEALTH CARE EDUCATION/TRAINING PROGRAM

## 2021-01-19 PROCEDURE — 25000003 PHARM REV CODE 250: Mod: NTX | Performed by: NURSE PRACTITIONER

## 2021-01-19 PROCEDURE — C9399 UNCLASSIFIED DRUGS OR BIOLOG: HCPCS | Mod: NTX | Performed by: NURSE PRACTITIONER

## 2021-01-19 PROCEDURE — 99223 1ST HOSP IP/OBS HIGH 75: CPT | Mod: NTX,,, | Performed by: INTERNAL MEDICINE

## 2021-01-19 PROCEDURE — 85025 COMPLETE CBC W/AUTO DIFF WBC: CPT | Mod: NTX

## 2021-01-19 PROCEDURE — 36415 COLL VENOUS BLD VENIPUNCTURE: CPT | Mod: NTX

## 2021-01-19 PROCEDURE — 83735 ASSAY OF MAGNESIUM: CPT | Mod: NTX

## 2021-01-19 PROCEDURE — 63600175 PHARM REV CODE 636 W HCPCS: Mod: NTX | Performed by: EMERGENCY MEDICINE

## 2021-01-19 PROCEDURE — 25000003 PHARM REV CODE 250: Mod: NTX | Performed by: PHYSICIAN ASSISTANT

## 2021-01-19 PROCEDURE — 99222 1ST HOSP IP/OBS MODERATE 55: CPT | Mod: NTX,,, | Performed by: NURSE PRACTITIONER

## 2021-01-19 PROCEDURE — 85610 PROTHROMBIN TIME: CPT | Mod: NTX

## 2021-01-19 PROCEDURE — 80053 COMPREHEN METABOLIC PANEL: CPT | Mod: NTX

## 2021-01-19 PROCEDURE — 63600175 PHARM REV CODE 636 W HCPCS: Mod: NTX | Performed by: PHYSICIAN ASSISTANT

## 2021-01-19 PROCEDURE — 99222 PR INITIAL HOSPITAL CARE,LEVL II: ICD-10-PCS | Mod: NTX,,, | Performed by: NURSE PRACTITIONER

## 2021-01-19 PROCEDURE — 93010 EKG 12-LEAD: ICD-10-PCS | Mod: NTX,,, | Performed by: INTERNAL MEDICINE

## 2021-01-19 PROCEDURE — 99233 SBSQ HOSP IP/OBS HIGH 50: CPT | Mod: NTX,,, | Performed by: NURSE PRACTITIONER

## 2021-01-19 PROCEDURE — 63600175 PHARM REV CODE 636 W HCPCS: Mod: NTX | Performed by: STUDENT IN AN ORGANIZED HEALTH CARE EDUCATION/TRAINING PROGRAM

## 2021-01-19 PROCEDURE — 93010 ELECTROCARDIOGRAM REPORT: CPT | Mod: NTX,,, | Performed by: INTERNAL MEDICINE

## 2021-01-19 PROCEDURE — 84100 ASSAY OF PHOSPHORUS: CPT | Mod: NTX

## 2021-01-19 PROCEDURE — 20600001 HC STEP DOWN PRIVATE ROOM: Mod: NTX

## 2021-01-19 PROCEDURE — 99233 PR SUBSEQUENT HOSPITAL CARE,LEVL III: ICD-10-PCS | Mod: NTX,,, | Performed by: NURSE PRACTITIONER

## 2021-01-19 PROCEDURE — 99223 PR INITIAL HOSPITAL CARE,LEVL III: ICD-10-PCS | Mod: NTX,,, | Performed by: INTERNAL MEDICINE

## 2021-01-19 PROCEDURE — 93005 ELECTROCARDIOGRAM TRACING: CPT | Mod: NTX

## 2021-01-19 RX ORDER — SERTRALINE HYDROCHLORIDE 25 MG/1
25 TABLET, FILM COATED ORAL DAILY
Status: DISCONTINUED | OUTPATIENT
Start: 2021-01-19 | End: 2021-01-20 | Stop reason: HOSPADM

## 2021-01-19 RX ORDER — FLUCONAZOLE 200 MG/1
400 TABLET ORAL DAILY
Status: DISCONTINUED | OUTPATIENT
Start: 2021-01-20 | End: 2021-01-20 | Stop reason: HOSPADM

## 2021-01-19 RX ADMIN — AMPICILLIN SODIUM AND SULBACTAM SODIUM 3 G: 2; 1 INJECTION, POWDER, FOR SOLUTION INTRAMUSCULAR; INTRAVENOUS at 08:01

## 2021-01-19 RX ADMIN — CEFEPIME 1 G: 1 INJECTION, POWDER, FOR SOLUTION INTRAMUSCULAR; INTRAVENOUS at 05:01

## 2021-01-19 RX ADMIN — VANCOMYCIN HYDROCHLORIDE 1250 MG: 1.25 INJECTION, POWDER, LYOPHILIZED, FOR SOLUTION INTRAVENOUS at 08:01

## 2021-01-19 RX ADMIN — GABAPENTIN 300 MG: 300 CAPSULE ORAL at 08:01

## 2021-01-19 RX ADMIN — AMPICILLIN SODIUM AND SULBACTAM SODIUM 3 G: 2; 1 INJECTION, POWDER, FOR SOLUTION INTRAMUSCULAR; INTRAVENOUS at 03:01

## 2021-01-19 RX ADMIN — GABAPENTIN 300 MG: 300 CAPSULE ORAL at 02:01

## 2021-01-19 RX ADMIN — RIFAXIMIN 550 MG: 550 TABLET ORAL at 08:01

## 2021-01-19 RX ADMIN — LACTULOSE 30 G: 20 SOLUTION ORAL at 05:01

## 2021-01-19 RX ADMIN — SERTRALINE HYDROCHLORIDE 25 MG: 25 TABLET ORAL at 12:01

## 2021-01-19 RX ADMIN — INSULIN ASPART 2 UNITS: 100 INJECTION, SOLUTION INTRAVENOUS; SUBCUTANEOUS at 08:01

## 2021-01-19 RX ADMIN — INSULIN ASPART 1 UNITS: 100 INJECTION, SOLUTION INTRAVENOUS; SUBCUTANEOUS at 08:01

## 2021-01-19 RX ADMIN — INSULIN DETEMIR 24 UNITS: 100 INJECTION, SOLUTION SUBCUTANEOUS at 12:01

## 2021-01-19 RX ADMIN — CEFEPIME 1 G: 1 INJECTION, POWDER, FOR SOLUTION INTRAMUSCULAR; INTRAVENOUS at 01:01

## 2021-01-19 RX ADMIN — ASPIRIN 81 MG: 81 TABLET, COATED ORAL at 08:01

## 2021-01-19 RX ADMIN — TRAZODONE HYDROCHLORIDE 50 MG: 50 TABLET ORAL at 08:01

## 2021-01-19 RX ADMIN — INSULIN ASPART 2 UNITS: 100 INJECTION, SOLUTION INTRAVENOUS; SUBCUTANEOUS at 12:01

## 2021-01-19 RX ADMIN — DOCUSATE SODIUM 100 MG: 100 CAPSULE, LIQUID FILLED ORAL at 08:01

## 2021-01-19 RX ADMIN — AMLODIPINE BESYLATE 5 MG: 5 TABLET ORAL at 08:01

## 2021-01-20 ENCOUNTER — TELEPHONE (OUTPATIENT)
Dept: ENDOCRINOLOGY | Facility: HOSPITAL | Age: 66
End: 2021-01-20

## 2021-01-20 VITALS
HEIGHT: 73 IN | WEIGHT: 205.94 LBS | HEART RATE: 94 BPM | RESPIRATION RATE: 17 BRPM | TEMPERATURE: 98 F | DIASTOLIC BLOOD PRESSURE: 65 MMHG | BODY MASS INDEX: 27.29 KG/M2 | SYSTOLIC BLOOD PRESSURE: 111 MMHG | OXYGEN SATURATION: 97 %

## 2021-01-20 LAB
ALBUMIN SERPL BCP-MCNC: 3 G/DL (ref 3.5–5.2)
ALP SERPL-CCNC: 146 U/L (ref 55–135)
ALT SERPL W/O P-5'-P-CCNC: 36 U/L (ref 10–44)
ANION GAP SERPL CALC-SCNC: 10 MMOL/L (ref 8–16)
AST SERPL-CCNC: 28 U/L (ref 10–40)
BACTERIA BLD CULT: NORMAL
BACTERIA BLD CULT: NORMAL
BASOPHILS # BLD AUTO: 0.08 K/UL (ref 0–0.2)
BASOPHILS NFR BLD: 0.7 % (ref 0–1.9)
BILIRUB SERPL-MCNC: 0.4 MG/DL (ref 0.1–1)
BUN SERPL-MCNC: 15 MG/DL (ref 8–23)
CALCIUM SERPL-MCNC: 8.9 MG/DL (ref 8.7–10.5)
CHLORIDE SERPL-SCNC: 103 MMOL/L (ref 95–110)
CO2 SERPL-SCNC: 25 MMOL/L (ref 23–29)
CREAT SERPL-MCNC: 0.8 MG/DL (ref 0.5–1.4)
DIFFERENTIAL METHOD: ABNORMAL
EOSINOPHIL # BLD AUTO: 0.1 K/UL (ref 0–0.5)
EOSINOPHIL NFR BLD: 0.8 % (ref 0–8)
ERYTHROCYTE [DISTWIDTH] IN BLOOD BY AUTOMATED COUNT: 13.4 % (ref 11.5–14.5)
EST. GFR  (AFRICAN AMERICAN): >60 ML/MIN/1.73 M^2
EST. GFR  (NON AFRICAN AMERICAN): >60 ML/MIN/1.73 M^2
GLUCOSE SERPL-MCNC: 133 MG/DL (ref 70–110)
HCT VFR BLD AUTO: 39.8 % (ref 40–54)
HGB BLD-MCNC: 12.7 G/DL (ref 14–18)
IMM GRANULOCYTES # BLD AUTO: 0.09 K/UL (ref 0–0.04)
IMM GRANULOCYTES NFR BLD AUTO: 0.8 % (ref 0–0.5)
INR PPP: 1 (ref 0.8–1.2)
LYMPHOCYTES # BLD AUTO: 1.4 K/UL (ref 1–4.8)
LYMPHOCYTES NFR BLD: 12.5 % (ref 18–48)
MAGNESIUM SERPL-MCNC: 1.7 MG/DL (ref 1.6–2.6)
MCH RBC QN AUTO: 29.8 PG (ref 27–31)
MCHC RBC AUTO-ENTMCNC: 31.9 G/DL (ref 32–36)
MCV RBC AUTO: 93 FL (ref 82–98)
MONOCYTES # BLD AUTO: 1.5 K/UL (ref 0.3–1)
MONOCYTES NFR BLD: 13.8 % (ref 4–15)
NEUTROPHILS # BLD AUTO: 7.8 K/UL (ref 1.8–7.7)
NEUTROPHILS NFR BLD: 71.4 % (ref 38–73)
NRBC BLD-RTO: 0 /100 WBC
PHOSPHATE SERPL-MCNC: 2.8 MG/DL (ref 2.7–4.5)
PLATELET # BLD AUTO: 251 K/UL (ref 150–350)
PMV BLD AUTO: 11.2 FL (ref 9.2–12.9)
POCT GLUCOSE: 158 MG/DL (ref 70–110)
POCT GLUCOSE: 234 MG/DL (ref 70–110)
POTASSIUM SERPL-SCNC: 3.7 MMOL/L (ref 3.5–5.1)
PROT SERPL-MCNC: 7.4 G/DL (ref 6–8.4)
PROTHROMBIN TIME: 11.2 SEC (ref 9–12.5)
RBC # BLD AUTO: 4.26 M/UL (ref 4.6–6.2)
SODIUM SERPL-SCNC: 138 MMOL/L (ref 136–145)
WBC # BLD AUTO: 10.9 K/UL (ref 3.9–12.7)

## 2021-01-20 PROCEDURE — 85610 PROTHROMBIN TIME: CPT | Mod: NTX

## 2021-01-20 PROCEDURE — 25000003 PHARM REV CODE 250: Mod: NTX | Performed by: STUDENT IN AN ORGANIZED HEALTH CARE EDUCATION/TRAINING PROGRAM

## 2021-01-20 PROCEDURE — 99239 PR HOSPITAL DISCHARGE DAY,>30 MIN: ICD-10-PCS | Mod: NTX,,, | Performed by: NURSE PRACTITIONER

## 2021-01-20 PROCEDURE — 99239 HOSP IP/OBS DSCHRG MGMT >30: CPT | Mod: NTX,,, | Performed by: NURSE PRACTITIONER

## 2021-01-20 PROCEDURE — 63600175 PHARM REV CODE 636 W HCPCS: Mod: NTX | Performed by: STUDENT IN AN ORGANIZED HEALTH CARE EDUCATION/TRAINING PROGRAM

## 2021-01-20 PROCEDURE — 36415 COLL VENOUS BLD VENIPUNCTURE: CPT | Mod: NTX

## 2021-01-20 PROCEDURE — 99232 SBSQ HOSP IP/OBS MODERATE 35: CPT | Mod: NTX,,, | Performed by: NURSE PRACTITIONER

## 2021-01-20 PROCEDURE — 80053 COMPREHEN METABOLIC PANEL: CPT | Mod: NTX

## 2021-01-20 PROCEDURE — 83735 ASSAY OF MAGNESIUM: CPT | Mod: NTX

## 2021-01-20 PROCEDURE — 84100 ASSAY OF PHOSPHORUS: CPT | Mod: NTX

## 2021-01-20 PROCEDURE — A4216 STERILE WATER/SALINE, 10 ML: HCPCS | Mod: NTX | Performed by: INTERNAL MEDICINE

## 2021-01-20 PROCEDURE — 99232 PR SUBSEQUENT HOSPITAL CARE,LEVL II: ICD-10-PCS | Mod: NTX,,, | Performed by: NURSE PRACTITIONER

## 2021-01-20 PROCEDURE — 76937 US GUIDE VASCULAR ACCESS: CPT | Mod: NTX

## 2021-01-20 PROCEDURE — 25000003 PHARM REV CODE 250: Mod: NTX | Performed by: INTERNAL MEDICINE

## 2021-01-20 PROCEDURE — 85025 COMPLETE CBC W/AUTO DIFF WBC: CPT | Mod: NTX

## 2021-01-20 PROCEDURE — 25000003 PHARM REV CODE 250: Mod: NTX | Performed by: PHYSICIAN ASSISTANT

## 2021-01-20 PROCEDURE — 36573 INSJ PICC RS&I 5 YR+: CPT | Mod: NTX

## 2021-01-20 PROCEDURE — C1751 CATH, INF, PER/CENT/MIDLINE: HCPCS | Mod: NTX

## 2021-01-20 PROCEDURE — 25000003 PHARM REV CODE 250: Mod: NTX | Performed by: NURSE PRACTITIONER

## 2021-01-20 RX ORDER — FLUCONAZOLE 200 MG/1
400 TABLET ORAL DAILY
Qty: 24 TABLET | Refills: 0 | Status: SHIPPED | OUTPATIENT
Start: 2021-01-21 | End: 2021-02-03

## 2021-01-20 RX ORDER — SODIUM CHLORIDE 0.9 % (FLUSH) 0.9 %
10 SYRINGE (ML) INJECTION EVERY 6 HOURS
Status: DISCONTINUED | OUTPATIENT
Start: 2021-01-20 | End: 2021-01-20 | Stop reason: HOSPADM

## 2021-01-20 RX ORDER — PANTOPRAZOLE SODIUM 40 MG/1
40 TABLET, DELAYED RELEASE ORAL DAILY
Status: DISCONTINUED | OUTPATIENT
Start: 2021-01-20 | End: 2021-01-20 | Stop reason: HOSPADM

## 2021-01-20 RX ORDER — INSULIN GLARGINE 100 [IU]/ML
34 INJECTION, SOLUTION SUBCUTANEOUS NIGHTLY
Status: ON HOLD | COMMUNITY
End: 2021-06-09 | Stop reason: SDUPTHER

## 2021-01-20 RX ORDER — MUPIROCIN 20 MG/G
OINTMENT TOPICAL 2 TIMES DAILY
Status: DISCONTINUED | OUTPATIENT
Start: 2021-01-20 | End: 2021-01-20 | Stop reason: HOSPADM

## 2021-01-20 RX ORDER — SODIUM CHLORIDE 0.9 % (FLUSH) 0.9 %
10 SYRINGE (ML) INJECTION
Status: DISCONTINUED | OUTPATIENT
Start: 2021-01-20 | End: 2021-01-20 | Stop reason: HOSPADM

## 2021-01-20 RX ORDER — FLUCONAZOLE 200 MG/1
400 TABLET ORAL DAILY
Qty: 26 TABLET | Refills: 0 | Status: SHIPPED | OUTPATIENT
Start: 2021-01-21 | End: 2021-01-20

## 2021-01-20 RX ORDER — FLUCONAZOLE 200 MG/1
400 TABLET ORAL DAILY
Qty: 24 TABLET | Refills: 0 | Status: SHIPPED | OUTPATIENT
Start: 2021-01-21 | End: 2021-01-20

## 2021-01-20 RX ORDER — INSULIN ASPART 100 [IU]/ML
6 INJECTION, SOLUTION INTRAVENOUS; SUBCUTANEOUS
Status: DISCONTINUED | OUTPATIENT
Start: 2021-01-20 | End: 2021-01-20 | Stop reason: HOSPADM

## 2021-01-20 RX ORDER — CALCIUM CARBONATE 200(500)MG
500 TABLET,CHEWABLE ORAL DAILY PRN
Status: DISCONTINUED | OUTPATIENT
Start: 2021-01-20 | End: 2021-01-20 | Stop reason: HOSPADM

## 2021-01-20 RX ORDER — INSULIN GLARGINE 100 [IU]/ML
30 INJECTION, SOLUTION SUBCUTANEOUS NIGHTLY
Qty: 10 ML | Refills: 2 | Status: SHIPPED | OUTPATIENT
Start: 2021-01-20 | End: 2021-01-20 | Stop reason: HOSPADM

## 2021-01-20 RX ADMIN — MUPIROCIN: 20 OINTMENT TOPICAL at 10:01

## 2021-01-20 RX ADMIN — LACTULOSE 30 G: 20 SOLUTION ORAL at 08:01

## 2021-01-20 RX ADMIN — SERTRALINE HYDROCHLORIDE 25 MG: 25 TABLET ORAL at 08:01

## 2021-01-20 RX ADMIN — Medication 10 ML: at 12:01

## 2021-01-20 RX ADMIN — CALCIUM CARBONATE (ANTACID) CHEW TAB 500 MG 500 MG: 500 CHEW TAB at 01:01

## 2021-01-20 RX ADMIN — BISACODYL 10 MG: 10 SUPPOSITORY RECTAL at 10:01

## 2021-01-20 RX ADMIN — RIFAXIMIN 550 MG: 550 TABLET ORAL at 08:01

## 2021-01-20 RX ADMIN — POLYETHYLENE GLYCOL 3350 17 G: 17 POWDER, FOR SOLUTION ORAL at 08:01

## 2021-01-20 RX ADMIN — GABAPENTIN 300 MG: 300 CAPSULE ORAL at 08:01

## 2021-01-20 RX ADMIN — ASPIRIN 81 MG: 81 TABLET, COATED ORAL at 08:01

## 2021-01-20 RX ADMIN — AMPICILLIN SODIUM AND SULBACTAM SODIUM 3 G: 2; 1 INJECTION, POWDER, FOR SOLUTION INTRAMUSCULAR; INTRAVENOUS at 04:01

## 2021-01-20 RX ADMIN — FLUCONAZOLE 400 MG: 200 TABLET ORAL at 08:01

## 2021-01-20 RX ADMIN — DOCUSATE SODIUM 100 MG: 100 CAPSULE, LIQUID FILLED ORAL at 08:01

## 2021-01-20 RX ADMIN — AMPICILLIN SODIUM AND SULBACTAM SODIUM 3 G: 2; 1 INJECTION, POWDER, FOR SOLUTION INTRAMUSCULAR; INTRAVENOUS at 08:01

## 2021-01-20 RX ADMIN — PANTOPRAZOLE SODIUM 40 MG: 40 TABLET, DELAYED RELEASE ORAL at 01:01

## 2021-01-20 RX ADMIN — INSULIN DETEMIR 24 UNITS: 100 INJECTION, SOLUTION SUBCUTANEOUS at 08:01

## 2021-01-20 RX ADMIN — INSULIN ASPART 2 UNITS: 100 INJECTION, SOLUTION INTRAVENOUS; SUBCUTANEOUS at 12:01

## 2021-01-20 RX ADMIN — AMLODIPINE BESYLATE 5 MG: 5 TABLET ORAL at 08:01

## 2021-01-22 ENCOUNTER — TELEPHONE (OUTPATIENT)
Dept: TRANSPLANT | Facility: CLINIC | Age: 66
End: 2021-01-22

## 2021-01-22 ENCOUNTER — HOSPITAL ENCOUNTER (OUTPATIENT)
Dept: RADIOLOGY | Facility: HOSPITAL | Age: 66
Discharge: HOME | End: 2021-01-22
Attending: INTERNAL MEDICINE
Payer: MEDICARE

## 2021-01-22 ENCOUNTER — HOSPITAL ENCOUNTER (OUTPATIENT)
Dept: CARDIOLOGY | Facility: HOSPITAL | Age: 66
Discharge: HOME | End: 2021-01-22
Attending: INTERNAL MEDICINE
Payer: MEDICARE

## 2021-01-22 VITALS
HEIGHT: 73 IN | BODY MASS INDEX: 27.17 KG/M2 | WEIGHT: 205 LBS | SYSTOLIC BLOOD PRESSURE: 111 MMHG | DIASTOLIC BLOOD PRESSURE: 65 MMHG | HEART RATE: 70 BPM

## 2021-01-22 DIAGNOSIS — C24.9 MALIGNANT NEOPLASM OF BILIARY TRACT, UNSPECIFIED: ICD-10-CM

## 2021-01-22 DIAGNOSIS — I35.0 AORTIC STENOSIS, MODERATE: ICD-10-CM

## 2021-01-22 DIAGNOSIS — Z01.818 PRE-TRANSPLANT EVALUATION FOR LIVER TRANSPLANT: ICD-10-CM

## 2021-01-22 DIAGNOSIS — C22.0 HEPATOCELLULAR CARCINOMA: ICD-10-CM

## 2021-01-22 LAB
ASCENDING AORTA: 3.64 CM
AV INDEX (PROSTH): 0.27
AV MEAN GRADIENT: 32 MMHG
AV PEAK GRADIENT: 55 MMHG
AV VALVE AREA: 1.22 CM2
AV VELOCITY RATIO: 0.27
BSA FOR ECHO PROCEDURE: 2.19 M2
CV ECHO LV RWT: 0.42 CM
DOP CALC AO PEAK VEL: 3.7 M/S
DOP CALC AO VTI: 81.7 CM
DOP CALC LVOT AREA: 4.5 CM2
DOP CALC LVOT DIAMETER: 2.4 CM
DOP CALC LVOT PEAK VEL: 1 M/S
DOP CALC LVOT STROKE VOLUME: 99.38 CM3
DOP CALCLVOT PEAK VEL VTI: 21.98 CM
E WAVE DECELERATION TIME: 222.56 MSEC
E/A RATIO: 0.82
E/E' RATIO: 6.24 M/S
ECHO LV POSTERIOR WALL: 1.13 CM (ref 0.6–1.1)
FRACTIONAL SHORTENING: 36 % (ref 28–44)
INTERVENTRICULAR SEPTUM: 0.85 CM (ref 0.6–1.1)
IVRT: 71.36 MSEC
LA MAJOR: 5.54 CM
LA MINOR: 5.69 CM
LA WIDTH: 5.5 CM
LEFT ATRIUM SIZE: 4.13 CM
LEFT ATRIUM VOLUME INDEX MOD: 48.6 ML/M2
LEFT ATRIUM VOLUME INDEX: 49.8 ML/M2
LEFT ATRIUM VOLUME MOD: 105.64 CM3
LEFT ATRIUM VOLUME: 108.39 CM3
LEFT INTERNAL DIMENSION IN SYSTOLE: 3.41 CM (ref 2.1–4)
LEFT VENTRICLE DIASTOLIC VOLUME INDEX: 63.24 ML/M2
LEFT VENTRICLE DIASTOLIC VOLUME: 137.52 ML
LEFT VENTRICLE MASS INDEX: 92 G/M2
LEFT VENTRICLE SYSTOLIC VOLUME INDEX: 21.9 ML/M2
LEFT VENTRICLE SYSTOLIC VOLUME: 47.7 ML
LEFT VENTRICULAR INTERNAL DIMENSION IN DIASTOLE: 5.34 CM (ref 3.5–6)
LEFT VENTRICULAR MASS: 200.24 G
LV LATERAL E/E' RATIO: 6.63 M/S
LV SEPTAL E/E' RATIO: 5.89 M/S
MV A" WAVE DURATION": 17.13 MSEC
MV PEAK A VEL: 0.65 M/S
MV PEAK E VEL: 0.53 M/S
PISA TR MAX VEL: 2.8 M/S
PULM VEIN S/D RATIO: 1.17
PV PEAK D VEL: 0.46 M/S
PV PEAK S VEL: 0.54 M/S
RA MAJOR: 5 CM
RA PRESSURE: 8 MMHG
RA WIDTH: 3.35 CM
RIGHT VENTRICULAR END-DIASTOLIC DIMENSION: 3.48 CM
RV TISSUE DOPPLER FREE WALL SYSTOLIC VELOCITY 1 (APICAL 4 CHAMBER VIEW): 16.12 CM/S
SINUS: 3.69 CM
STJ: 3.21 CM
TDI LATERAL: 0.08 M/S
TDI SEPTAL: 0.09 M/S
TDI: 0.09 M/S
TR MAX PG: 31 MMHG
TRICUSPID ANNULAR PLANE SYSTOLIC EXCURSION: 2.53 CM
TV REST PULMONARY ARTERY PRESSURE: 39 MMHG

## 2021-01-22 PROCEDURE — 74177 CT ABD & PELVIS W/CONTRAST: CPT | Mod: TC,TXP

## 2021-01-22 PROCEDURE — 93306 TTE W/DOPPLER COMPLETE: CPT | Mod: 26,TXP,, | Performed by: INTERNAL MEDICINE

## 2021-01-22 PROCEDURE — G0180 MD CERTIFICATION HHA PATIENT: HCPCS | Mod: NTX,,, | Performed by: NURSE PRACTITIONER

## 2021-01-22 PROCEDURE — 74177 CT ABDOMEN PELVIS WITH CONTRAST: ICD-10-PCS | Mod: 26,TXP,, | Performed by: RADIOLOGY

## 2021-01-22 PROCEDURE — 93306 TTE W/DOPPLER COMPLETE: CPT | Mod: TXP

## 2021-01-22 PROCEDURE — 74177 CT ABD & PELVIS W/CONTRAST: CPT | Mod: 26,TXP,, | Performed by: RADIOLOGY

## 2021-01-22 PROCEDURE — G0180 PR HOME HEALTH MD CERTIFICATION: ICD-10-PCS | Mod: NTX,,, | Performed by: NURSE PRACTITIONER

## 2021-01-22 PROCEDURE — 25500020 PHARM REV CODE 255: Mod: TXP | Performed by: INTERNAL MEDICINE

## 2021-01-22 PROCEDURE — 93306 ECHO (CUPID ONLY): ICD-10-PCS | Mod: 26,TXP,, | Performed by: INTERNAL MEDICINE

## 2021-01-22 RX ADMIN — IOHEXOL 100 ML: 350 INJECTION, SOLUTION INTRAVENOUS at 05:01

## 2021-01-25 ENCOUNTER — OFFICE VISIT (OUTPATIENT)
Dept: CARDIOLOGY | Facility: CLINIC | Age: 66
End: 2021-01-25
Payer: MEDICARE

## 2021-01-25 ENCOUNTER — TELEPHONE (OUTPATIENT)
Dept: TRANSPLANT | Facility: CLINIC | Age: 66
End: 2021-01-25

## 2021-01-25 ENCOUNTER — HOSPITAL ENCOUNTER (OUTPATIENT)
Dept: RADIOLOGY | Facility: CLINIC | Age: 66
Discharge: HOME | End: 2021-01-25
Attending: INTERNAL MEDICINE
Payer: MEDICARE

## 2021-01-25 ENCOUNTER — EDUCATION (OUTPATIENT)
Dept: CARDIOLOGY | Facility: CLINIC | Age: 66
End: 2021-01-25

## 2021-01-25 VITALS
BODY MASS INDEX: 28.4 KG/M2 | DIASTOLIC BLOOD PRESSURE: 65 MMHG | OXYGEN SATURATION: 96 % | SYSTOLIC BLOOD PRESSURE: 128 MMHG | WEIGHT: 209.69 LBS | HEART RATE: 73 BPM | HEIGHT: 72 IN

## 2021-01-25 DIAGNOSIS — C22.0 HEPATOCELLULAR CARCINOMA: ICD-10-CM

## 2021-01-25 DIAGNOSIS — E11.40 TYPE 2 DIABETES MELLITUS WITH DIABETIC NEUROPATHY, UNSPECIFIED WHETHER LONG TERM INSULIN USE: ICD-10-CM

## 2021-01-25 DIAGNOSIS — I25.10 CORONARY ARTERY DISEASE, ANGINA PRESENCE UNSPECIFIED, UNSPECIFIED VESSEL OR LESION TYPE, UNSPECIFIED WHETHER NATIVE OR TRANSPLANTED HEART: Primary | ICD-10-CM

## 2021-01-25 DIAGNOSIS — K75.81 NASH (NONALCOHOLIC STEATOHEPATITIS): ICD-10-CM

## 2021-01-25 DIAGNOSIS — Z01.818 PRE-TRANSPLANT EVALUATION FOR LIVER TRANSPLANT: ICD-10-CM

## 2021-01-25 DIAGNOSIS — C44.90 SKIN CANCER: ICD-10-CM

## 2021-01-25 DIAGNOSIS — C22.0 HCC (HEPATOCELLULAR CARCINOMA): ICD-10-CM

## 2021-01-25 DIAGNOSIS — Z01.812 PRE-PROCEDURE LAB EXAM: Primary | ICD-10-CM

## 2021-01-25 DIAGNOSIS — F32.89 OTHER DEPRESSION: Primary | ICD-10-CM

## 2021-01-25 DIAGNOSIS — K74.60 HEPATIC CIRRHOSIS, UNSPECIFIED HEPATIC CIRRHOSIS TYPE, UNSPECIFIED WHETHER ASCITES PRESENT: ICD-10-CM

## 2021-01-25 DIAGNOSIS — Z01.812 PRE-PROCEDURE LAB EXAM: ICD-10-CM

## 2021-01-25 DIAGNOSIS — K76.6 PORTAL HYPERTENSION: ICD-10-CM

## 2021-01-25 DIAGNOSIS — R07.9 CHEST PAIN, UNSPECIFIED TYPE: ICD-10-CM

## 2021-01-25 DIAGNOSIS — F32.A DEPRESSION, UNSPECIFIED DEPRESSION TYPE: ICD-10-CM

## 2021-01-25 DIAGNOSIS — I48.11 LONGSTANDING PERSISTENT ATRIAL FIBRILLATION: Primary | ICD-10-CM

## 2021-01-25 PROCEDURE — 77080 DEXA BONE DENSITY SPINE HIP: ICD-10-PCS | Mod: 26,TXP,, | Performed by: INTERNAL MEDICINE

## 2021-01-25 PROCEDURE — 3008F BODY MASS INDEX DOCD: CPT | Mod: CPTII,S$GLB,TXP, | Performed by: INTERNAL MEDICINE

## 2021-01-25 PROCEDURE — 77080 DXA BONE DENSITY AXIAL: CPT | Mod: TC,TXP

## 2021-01-25 PROCEDURE — 77080 DXA BONE DENSITY AXIAL: CPT | Mod: 26,TXP,, | Performed by: INTERNAL MEDICINE

## 2021-01-25 PROCEDURE — 3074F PR MOST RECENT SYSTOLIC BLOOD PRESSURE < 130 MM HG: ICD-10-PCS | Mod: CPTII,S$GLB,TXP, | Performed by: INTERNAL MEDICINE

## 2021-01-25 PROCEDURE — 99999 PR PBB SHADOW E&M-EST. PATIENT-LVL IV: CPT | Mod: PBBFAC,TXP,, | Performed by: INTERNAL MEDICINE

## 2021-01-25 PROCEDURE — 3008F PR BODY MASS INDEX (BMI) DOCUMENTED: ICD-10-PCS | Mod: CPTII,S$GLB,TXP, | Performed by: INTERNAL MEDICINE

## 2021-01-25 PROCEDURE — 1125F PR PAIN SEVERITY QUANTIFIED, PAIN PRESENT: ICD-10-PCS | Mod: S$GLB,TXP,, | Performed by: INTERNAL MEDICINE

## 2021-01-25 PROCEDURE — 3078F PR MOST RECENT DIASTOLIC BLOOD PRESSURE < 80 MM HG: ICD-10-PCS | Mod: CPTII,S$GLB,TXP, | Performed by: INTERNAL MEDICINE

## 2021-01-25 PROCEDURE — 99204 PR OFFICE/OUTPT VISIT, NEW, LEVL IV, 45-59 MIN: ICD-10-PCS | Mod: S$GLB,TXP,, | Performed by: INTERNAL MEDICINE

## 2021-01-25 PROCEDURE — 99204 OFFICE O/P NEW MOD 45 MIN: CPT | Mod: S$GLB,TXP,, | Performed by: INTERNAL MEDICINE

## 2021-01-25 PROCEDURE — 3044F HG A1C LEVEL LT 7.0%: CPT | Mod: CPTII,S$GLB,TXP, | Performed by: INTERNAL MEDICINE

## 2021-01-25 PROCEDURE — 1125F AMNT PAIN NOTED PAIN PRSNT: CPT | Mod: S$GLB,TXP,, | Performed by: INTERNAL MEDICINE

## 2021-01-25 PROCEDURE — 3078F DIAST BP <80 MM HG: CPT | Mod: CPTII,S$GLB,TXP, | Performed by: INTERNAL MEDICINE

## 2021-01-25 PROCEDURE — 3044F PR MOST RECENT HEMOGLOBIN A1C LEVEL <7.0%: ICD-10-PCS | Mod: CPTII,S$GLB,TXP, | Performed by: INTERNAL MEDICINE

## 2021-01-25 PROCEDURE — 1159F PR MEDICATION LIST DOCUMENTED IN MEDICAL RECORD: ICD-10-PCS | Mod: S$GLB,TXP,, | Performed by: INTERNAL MEDICINE

## 2021-01-25 PROCEDURE — 99999 PR PBB SHADOW E&M-EST. PATIENT-LVL IV: ICD-10-PCS | Mod: PBBFAC,TXP,, | Performed by: INTERNAL MEDICINE

## 2021-01-25 PROCEDURE — 3074F SYST BP LT 130 MM HG: CPT | Mod: CPTII,S$GLB,TXP, | Performed by: INTERNAL MEDICINE

## 2021-01-25 PROCEDURE — 1159F MED LIST DOCD IN RCRD: CPT | Mod: S$GLB,TXP,, | Performed by: INTERNAL MEDICINE

## 2021-01-25 RX ORDER — SODIUM CHLORIDE 9 MG/ML
INJECTION, SOLUTION INTRAVENOUS CONTINUOUS
Status: CANCELLED | OUTPATIENT
Start: 2021-01-25 | End: 2021-01-25

## 2021-01-25 RX ORDER — ASPIRIN 81 MG/1
81 TABLET ORAL DAILY
COMMUNITY
End: 2021-02-25 | Stop reason: SDUPTHER

## 2021-01-25 RX ORDER — DIPHENHYDRAMINE HCL 25 MG
50 CAPSULE ORAL ONCE
Status: CANCELLED | OUTPATIENT
Start: 2021-01-25 | End: 2021-01-25

## 2021-01-25 RX ORDER — CLOPIDOGREL BISULFATE 75 MG/1
75 TABLET ORAL DAILY
Qty: 5 TABLET | Refills: 0 | Status: ON HOLD | OUTPATIENT
Start: 2021-01-25 | End: 2021-01-27 | Stop reason: HOSPADM

## 2021-01-26 ENCOUNTER — LAB VISIT (OUTPATIENT)
Dept: PRIMARY CARE CLINIC | Facility: CLINIC | Age: 66
End: 2021-01-26
Payer: MEDICARE

## 2021-01-26 ENCOUNTER — TELEPHONE (OUTPATIENT)
Dept: TRANSPLANT | Facility: CLINIC | Age: 66
End: 2021-01-26

## 2021-01-26 ENCOUNTER — CONFERENCE (OUTPATIENT)
Dept: TRANSPLANT | Facility: CLINIC | Age: 66
End: 2021-01-26

## 2021-01-26 ENCOUNTER — TELEPHONE (OUTPATIENT)
Dept: HEPATOLOGY | Facility: CLINIC | Age: 66
End: 2021-01-26

## 2021-01-26 DIAGNOSIS — Z01.812 PRE-PROCEDURE LAB EXAM: ICD-10-CM

## 2021-01-26 PROCEDURE — U0003 INFECTIOUS AGENT DETECTION BY NUCLEIC ACID (DNA OR RNA); SEVERE ACUTE RESPIRATORY SYNDROME CORONAVIRUS 2 (SARS-COV-2) (CORONAVIRUS DISEASE [COVID-19]), AMPLIFIED PROBE TECHNIQUE, MAKING USE OF HIGH THROUGHPUT TECHNOLOGIES AS DESCRIBED BY CMS-2020-01-R: HCPCS | Mod: NTX

## 2021-01-27 ENCOUNTER — TELEPHONE (OUTPATIENT)
Dept: TRANSPLANT | Facility: CLINIC | Age: 66
End: 2021-01-27

## 2021-01-27 ENCOUNTER — HOSPITAL ENCOUNTER (OUTPATIENT)
Facility: HOSPITAL | Age: 66
Discharge: HOME | End: 2021-01-27
Attending: INTERNAL MEDICINE | Admitting: INTERNAL MEDICINE
Payer: MEDICARE

## 2021-01-27 VITALS
RESPIRATION RATE: 18 BRPM | BODY MASS INDEX: 28.31 KG/M2 | HEART RATE: 73 BPM | DIASTOLIC BLOOD PRESSURE: 67 MMHG | WEIGHT: 209 LBS | TEMPERATURE: 98 F | OXYGEN SATURATION: 95 % | SYSTOLIC BLOOD PRESSURE: 142 MMHG | HEIGHT: 72 IN

## 2021-01-27 DIAGNOSIS — Z01.812 PRE-PROCEDURE LAB EXAM: ICD-10-CM

## 2021-01-27 DIAGNOSIS — I25.10 CORONARY ARTERY DISEASE, ANGINA PRESENCE UNSPECIFIED, UNSPECIFIED VESSEL OR LESION TYPE, UNSPECIFIED WHETHER NATIVE OR TRANSPLANTED HEART: ICD-10-CM

## 2021-01-27 DIAGNOSIS — R07.9 CHEST PAIN, UNSPECIFIED TYPE: ICD-10-CM

## 2021-01-27 LAB
ABO + RH BLD: NORMAL
BLD GP AB SCN CELLS X3 SERPL QL: NORMAL
PLATELET RESPONSE PLAVIX: 271 PRU (ref 194–418)
POCT GLUCOSE: 125 MG/DL (ref 70–110)
SARS-COV-2 RNA RESP QL NAA+PROBE: NOT DETECTED

## 2021-01-27 PROCEDURE — C1769 GUIDE WIRE: HCPCS | Mod: TXP | Performed by: INTERNAL MEDICINE

## 2021-01-27 PROCEDURE — 25500020 PHARM REV CODE 255: Mod: TXP | Performed by: INTERNAL MEDICINE

## 2021-01-27 PROCEDURE — C1894 INTRO/SHEATH, NON-LASER: HCPCS | Mod: TXP | Performed by: INTERNAL MEDICINE

## 2021-01-27 PROCEDURE — 99152 MOD SED SAME PHYS/QHP 5/>YRS: CPT | Mod: TXP | Performed by: INTERNAL MEDICINE

## 2021-01-27 PROCEDURE — 93454 CORONARY ARTERY ANGIO S&I: CPT | Mod: 26,TXP,, | Performed by: INTERNAL MEDICINE

## 2021-01-27 PROCEDURE — 25000003 PHARM REV CODE 250: Mod: TXP | Performed by: STUDENT IN AN ORGANIZED HEALTH CARE EDUCATION/TRAINING PROGRAM

## 2021-01-27 PROCEDURE — C1887 CATHETER, GUIDING: HCPCS | Mod: TXP | Performed by: INTERNAL MEDICINE

## 2021-01-27 PROCEDURE — 82962 GLUCOSE BLOOD TEST: CPT | Mod: TXP | Performed by: INTERNAL MEDICINE

## 2021-01-27 PROCEDURE — 93010 EKG 12-LEAD: ICD-10-PCS | Mod: TXP,,, | Performed by: INTERNAL MEDICINE

## 2021-01-27 PROCEDURE — 86900 BLOOD TYPING SEROLOGIC ABO: CPT | Mod: TXP

## 2021-01-27 PROCEDURE — 93005 ELECTROCARDIOGRAM TRACING: CPT | Mod: TXP

## 2021-01-27 PROCEDURE — 93010 ELECTROCARDIOGRAM REPORT: CPT | Mod: TXP,,, | Performed by: INTERNAL MEDICINE

## 2021-01-27 PROCEDURE — 93454 PR CATH PLACE/CORONARY ANGIO, IMG SUPER/INTERP: ICD-10-PCS | Mod: 26,TXP,, | Performed by: INTERNAL MEDICINE

## 2021-01-27 PROCEDURE — 25000003 PHARM REV CODE 250: Mod: TXP | Performed by: INTERNAL MEDICINE

## 2021-01-27 PROCEDURE — 63600175 PHARM REV CODE 636 W HCPCS: Mod: TXP | Performed by: INTERNAL MEDICINE

## 2021-01-27 PROCEDURE — 85576 BLOOD PLATELET AGGREGATION: CPT | Mod: TXP

## 2021-01-27 PROCEDURE — 93454 CORONARY ARTERY ANGIO S&I: CPT | Mod: TXP | Performed by: INTERNAL MEDICINE

## 2021-01-27 RX ORDER — HEPARIN SOD,PORCINE/0.9 % NACL 1000/500ML
INTRAVENOUS SOLUTION INTRAVENOUS
Status: DISCONTINUED | OUTPATIENT
Start: 2021-01-27 | End: 2021-01-27 | Stop reason: HOSPADM

## 2021-01-27 RX ORDER — DIPHENHYDRAMINE HCL 50 MG
50 CAPSULE ORAL ONCE
Status: COMPLETED | OUTPATIENT
Start: 2021-01-27 | End: 2021-01-27

## 2021-01-27 RX ORDER — NAPROXEN SODIUM 220 MG/1
81 TABLET, FILM COATED ORAL ONCE
Status: COMPLETED | OUTPATIENT
Start: 2021-01-27 | End: 2021-01-27

## 2021-01-27 RX ORDER — LIDOCAINE HYDROCHLORIDE 20 MG/ML
INJECTION, SOLUTION INFILTRATION; PERINEURAL
Status: DISCONTINUED | OUTPATIENT
Start: 2021-01-27 | End: 2021-01-27 | Stop reason: HOSPADM

## 2021-01-27 RX ORDER — NITROGLYCERIN 5 MG/ML
INJECTION, SOLUTION INTRAVENOUS
Status: DISCONTINUED | OUTPATIENT
Start: 2021-01-27 | End: 2021-01-27 | Stop reason: HOSPADM

## 2021-01-27 RX ORDER — ONDANSETRON 8 MG/1
8 TABLET, ORALLY DISINTEGRATING ORAL EVERY 8 HOURS PRN
Status: DISCONTINUED | OUTPATIENT
Start: 2021-01-27 | End: 2021-01-27 | Stop reason: HOSPADM

## 2021-01-27 RX ORDER — ACETAMINOPHEN 325 MG/1
650 TABLET ORAL EVERY 4 HOURS PRN
Status: DISCONTINUED | OUTPATIENT
Start: 2021-01-27 | End: 2021-01-27 | Stop reason: HOSPADM

## 2021-01-27 RX ORDER — SODIUM CHLORIDE 9 MG/ML
INJECTION, SOLUTION INTRAVENOUS CONTINUOUS
Status: DISCONTINUED | OUTPATIENT
Start: 2021-01-27 | End: 2021-01-27 | Stop reason: HOSPADM

## 2021-01-27 RX ORDER — HEPARIN SODIUM 1000 [USP'U]/ML
INJECTION, SOLUTION INTRAVENOUS; SUBCUTANEOUS
Status: DISCONTINUED | OUTPATIENT
Start: 2021-01-27 | End: 2021-01-27 | Stop reason: HOSPADM

## 2021-01-27 RX ORDER — MIDAZOLAM HYDROCHLORIDE 1 MG/ML
INJECTION, SOLUTION INTRAMUSCULAR; INTRAVENOUS
Status: DISCONTINUED | OUTPATIENT
Start: 2021-01-27 | End: 2021-01-27 | Stop reason: HOSPADM

## 2021-01-27 RX ORDER — SODIUM CHLORIDE 9 MG/ML
INJECTION, SOLUTION INTRAVENOUS CONTINUOUS
Status: ACTIVE | OUTPATIENT
Start: 2021-01-27 | End: 2021-01-27

## 2021-01-27 RX ORDER — SODIUM CHLORIDE 9 MG/ML
INJECTION, SOLUTION INTRAVENOUS
Status: DISCONTINUED | OUTPATIENT
Start: 2021-01-27 | End: 2021-01-27 | Stop reason: HOSPADM

## 2021-01-27 RX ADMIN — SODIUM CHLORIDE: 0.9 INJECTION, SOLUTION INTRAVENOUS at 08:01

## 2021-01-27 RX ADMIN — DIPHENHYDRAMINE HYDROCHLORIDE 50 MG: 50 CAPSULE ORAL at 08:01

## 2021-01-27 RX ADMIN — ASPIRIN 81 MG: 81 TABLET, CHEWABLE ORAL at 08:01

## 2021-01-28 ENCOUNTER — TELEPHONE (OUTPATIENT)
Dept: TRANSPLANT | Facility: CLINIC | Age: 66
End: 2021-01-28

## 2021-01-28 ENCOUNTER — TELEPHONE (OUTPATIENT)
Dept: INFUSION THERAPY | Facility: HOSPITAL | Age: 66
End: 2021-01-28

## 2021-01-28 ENCOUNTER — OFFICE VISIT (OUTPATIENT)
Dept: HEPATOLOGY | Facility: CLINIC | Age: 66
End: 2021-01-28
Payer: MEDICARE

## 2021-01-28 DIAGNOSIS — K76.6 PORTAL HYPERTENSION: ICD-10-CM

## 2021-01-28 DIAGNOSIS — I73.9 PAD (PERIPHERAL ARTERY DISEASE): ICD-10-CM

## 2021-01-28 DIAGNOSIS — C44.90 SKIN CANCER: ICD-10-CM

## 2021-01-28 DIAGNOSIS — K75.81 NASH (NONALCOHOLIC STEATOHEPATITIS): ICD-10-CM

## 2021-01-28 DIAGNOSIS — K74.60 HEPATIC CIRRHOSIS, UNSPECIFIED HEPATIC CIRRHOSIS TYPE, UNSPECIFIED WHETHER ASCITES PRESENT: ICD-10-CM

## 2021-01-28 DIAGNOSIS — R16.0 LIVER MASS: ICD-10-CM

## 2021-01-28 DIAGNOSIS — R10.9 ABDOMINAL PAIN, UNSPECIFIED ABDOMINAL LOCATION: ICD-10-CM

## 2021-01-28 DIAGNOSIS — C22.0 HCC (HEPATOCELLULAR CARCINOMA): Primary | ICD-10-CM

## 2021-01-28 DIAGNOSIS — R10.11 RIGHT UPPER QUADRANT ABDOMINAL PAIN: ICD-10-CM

## 2021-01-28 PROCEDURE — 99215 OFFICE O/P EST HI 40 MIN: CPT | Mod: 95,TXP,, | Performed by: INTERNAL MEDICINE

## 2021-01-28 PROCEDURE — 1159F MED LIST DOCD IN RCRD: CPT | Mod: 95,TXP,, | Performed by: INTERNAL MEDICINE

## 2021-01-28 PROCEDURE — 99215 PR OFFICE/OUTPT VISIT, EST, LEVL V, 40-54 MIN: ICD-10-PCS | Mod: 95,TXP,, | Performed by: INTERNAL MEDICINE

## 2021-01-28 PROCEDURE — 1159F PR MEDICATION LIST DOCUMENTED IN MEDICAL RECORD: ICD-10-PCS | Mod: 95,TXP,, | Performed by: INTERNAL MEDICINE

## 2021-01-29 ENCOUNTER — LAB VISIT (OUTPATIENT)
Dept: LAB | Facility: HOSPITAL | Age: 66
End: 2021-01-29
Attending: INTERNAL MEDICINE
Payer: MEDICARE

## 2021-01-29 DIAGNOSIS — C22.0 HCC (HEPATOCELLULAR CARCINOMA): ICD-10-CM

## 2021-01-29 DIAGNOSIS — C44.90 SKIN CANCER: ICD-10-CM

## 2021-01-29 DIAGNOSIS — E78.5 HYPERLIPIDEMIA, UNSPECIFIED HYPERLIPIDEMIA TYPE: ICD-10-CM

## 2021-01-29 DIAGNOSIS — K74.60 HEPATIC CIRRHOSIS, UNSPECIFIED HEPATIC CIRRHOSIS TYPE, UNSPECIFIED WHETHER ASCITES PRESENT: ICD-10-CM

## 2021-01-29 DIAGNOSIS — K76.6 PORTAL HYPERTENSION: ICD-10-CM

## 2021-01-29 DIAGNOSIS — R16.0 LIVER MASS: ICD-10-CM

## 2021-01-29 DIAGNOSIS — Z01.818 PRE-TRANSPLANT EVALUATION FOR LIVER TRANSPLANT: ICD-10-CM

## 2021-01-29 DIAGNOSIS — E08.610 DIABETES MELLITUS DUE TO UNDERLYING CONDITION WITH DIABETIC NEUROPATHIC ARTHROPATHY: ICD-10-CM

## 2021-01-29 DIAGNOSIS — N20.0 KIDNEY STONES: ICD-10-CM

## 2021-01-29 DIAGNOSIS — K75.81 NASH (NONALCOHOLIC STEATOHEPATITIS): ICD-10-CM

## 2021-01-29 DIAGNOSIS — E13.65 OTHER SPECIFIED DIABETES MELLITUS WITH HYPERGLYCEMIA: ICD-10-CM

## 2021-01-29 DIAGNOSIS — I10 HYPERTENSION, UNSPECIFIED TYPE: ICD-10-CM

## 2021-01-29 DIAGNOSIS — K06.9 GUM DISEASE: ICD-10-CM

## 2021-01-29 LAB
ALBUMIN SERPL BCP-MCNC: 3.1 G/DL (ref 3.5–5.2)
ALP SERPL-CCNC: 133 U/L (ref 55–135)
ALT SERPL W/O P-5'-P-CCNC: 17 U/L (ref 10–44)
ANION GAP SERPL CALC-SCNC: 12 MMOL/L (ref 8–16)
AST SERPL-CCNC: 18 U/L (ref 10–40)
BASOPHILS # BLD AUTO: 0.06 K/UL (ref 0–0.2)
BASOPHILS NFR BLD: 0.7 % (ref 0–1.9)
BILIRUB SERPL-MCNC: 0.4 MG/DL (ref 0.1–1)
BUN SERPL-MCNC: 19 MG/DL (ref 8–23)
CALCIUM SERPL-MCNC: 9.4 MG/DL (ref 8.7–10.5)
CHLORIDE SERPL-SCNC: 102 MMOL/L (ref 95–110)
CO2 SERPL-SCNC: 26 MMOL/L (ref 23–29)
CREAT SERPL-MCNC: 0.8 MG/DL (ref 0.5–1.4)
DIFFERENTIAL METHOD: ABNORMAL
EOSINOPHIL # BLD AUTO: 0.2 K/UL (ref 0–0.5)
EOSINOPHIL NFR BLD: 2.3 % (ref 0–8)
ERYTHROCYTE [DISTWIDTH] IN BLOOD BY AUTOMATED COUNT: 13.8 % (ref 11.5–14.5)
EST. GFR  (AFRICAN AMERICAN): >60 ML/MIN/1.73 M^2
EST. GFR  (NON AFRICAN AMERICAN): >60 ML/MIN/1.73 M^2
GLUCOSE SERPL-MCNC: 203 MG/DL (ref 70–110)
HCT VFR BLD AUTO: 39.3 % (ref 40–54)
HGB BLD-MCNC: 12.6 G/DL (ref 14–18)
IMM GRANULOCYTES # BLD AUTO: 0.08 K/UL (ref 0–0.04)
IMM GRANULOCYTES NFR BLD AUTO: 1 % (ref 0–0.5)
INR PPP: 1 (ref 0.8–1.2)
LYMPHOCYTES # BLD AUTO: 1.1 K/UL (ref 1–4.8)
LYMPHOCYTES NFR BLD: 13.8 % (ref 18–48)
MCH RBC QN AUTO: 29.9 PG (ref 27–31)
MCHC RBC AUTO-ENTMCNC: 32.1 G/DL (ref 32–36)
MCV RBC AUTO: 93 FL (ref 82–98)
MONOCYTES # BLD AUTO: 0.8 K/UL (ref 0.3–1)
MONOCYTES NFR BLD: 9.5 % (ref 4–15)
NEUTROPHILS # BLD AUTO: 5.9 K/UL (ref 1.8–7.7)
NEUTROPHILS NFR BLD: 72.7 % (ref 38–73)
NRBC BLD-RTO: 0 /100 WBC
PLATELET # BLD AUTO: 210 K/UL (ref 150–350)
PMV BLD AUTO: 11.3 FL (ref 9.2–12.9)
POTASSIUM SERPL-SCNC: 4.4 MMOL/L (ref 3.5–5.1)
PROT SERPL-MCNC: 7.7 G/DL (ref 6–8.4)
PROTHROMBIN TIME: 11.4 SEC (ref 9–12.5)
RBC # BLD AUTO: 4.21 M/UL (ref 4.6–6.2)
SODIUM SERPL-SCNC: 140 MMOL/L (ref 136–145)
WBC # BLD AUTO: 8.17 K/UL (ref 3.9–12.7)

## 2021-01-29 PROCEDURE — 36415 COLL VENOUS BLD VENIPUNCTURE: CPT | Mod: TXP

## 2021-01-29 PROCEDURE — 80053 COMPREHEN METABOLIC PANEL: CPT | Mod: TXP

## 2021-01-29 PROCEDURE — 85025 COMPLETE CBC W/AUTO DIFF WBC: CPT | Mod: TXP

## 2021-01-29 PROCEDURE — 85610 PROTHROMBIN TIME: CPT | Mod: TXP

## 2021-02-01 ENCOUNTER — HOSPITAL ENCOUNTER (OUTPATIENT)
Dept: RADIOLOGY | Facility: HOSPITAL | Age: 66
Discharge: HOME | End: 2021-02-01
Attending: FAMILY MEDICINE
Payer: MEDICARE

## 2021-02-01 DIAGNOSIS — K74.60 HEPATIC CIRRHOSIS, UNSPECIFIED HEPATIC CIRRHOSIS TYPE, UNSPECIFIED WHETHER ASCITES PRESENT: ICD-10-CM

## 2021-02-01 DIAGNOSIS — Z01.812 PRE-PROCEDURE LAB EXAM: ICD-10-CM

## 2021-02-01 DIAGNOSIS — K76.6 PORTAL HYPERTENSION: ICD-10-CM

## 2021-02-01 DIAGNOSIS — C44.90 SKIN CANCER: ICD-10-CM

## 2021-02-01 DIAGNOSIS — C22.0 HCC (HEPATOCELLULAR CARCINOMA): ICD-10-CM

## 2021-02-01 DIAGNOSIS — C22.0 HEPATOCELLULAR CARCINOMA: ICD-10-CM

## 2021-02-01 DIAGNOSIS — E11.40 TYPE 2 DIABETES MELLITUS WITH DIABETIC NEUROPATHY, UNSPECIFIED WHETHER LONG TERM INSULIN USE: ICD-10-CM

## 2021-02-01 DIAGNOSIS — K75.81 NASH (NONALCOHOLIC STEATOHEPATITIS): ICD-10-CM

## 2021-02-01 DIAGNOSIS — Z01.818 PRE-TRANSPLANT EVALUATION FOR LIVER TRANSPLANT: ICD-10-CM

## 2021-02-01 PROCEDURE — 71250 CT THORAX DX C-: CPT | Mod: 26,TXP,, | Performed by: RADIOLOGY

## 2021-02-01 PROCEDURE — 74160 CT ABDOMEN W/CONTRAST: CPT | Mod: TC,TXP

## 2021-02-01 PROCEDURE — 71250 CT THORAX DX C-: CPT | Mod: TC,TXP

## 2021-02-01 PROCEDURE — 25500020 PHARM REV CODE 255: Mod: TXP | Performed by: FAMILY MEDICINE

## 2021-02-01 PROCEDURE — 71250 CT CHEST WITHOUT CONTRAST: ICD-10-PCS | Mod: 26,TXP,, | Performed by: RADIOLOGY

## 2021-02-01 PROCEDURE — 74160 CT ABDOMEN WITH CONTRAST: ICD-10-PCS | Mod: 26,TXP,, | Performed by: INTERNAL MEDICINE

## 2021-02-01 PROCEDURE — 74160 CT ABDOMEN W/CONTRAST: CPT | Mod: 26,TXP,, | Performed by: INTERNAL MEDICINE

## 2021-02-01 RX ADMIN — IOHEXOL 100 ML: 350 INJECTION, SOLUTION INTRAVENOUS at 11:02

## 2021-02-02 ENCOUNTER — OFFICE VISIT (OUTPATIENT)
Dept: INFECTIOUS DISEASES | Facility: CLINIC | Age: 66
End: 2021-02-02
Payer: MEDICARE

## 2021-02-02 VITALS
BODY MASS INDEX: 27.74 KG/M2 | HEART RATE: 118 BPM | TEMPERATURE: 99 F | WEIGHT: 204.81 LBS | SYSTOLIC BLOOD PRESSURE: 162 MMHG | HEIGHT: 72 IN | DIASTOLIC BLOOD PRESSURE: 82 MMHG

## 2021-02-02 DIAGNOSIS — K75.0 ABSCESS OF LIVER: Primary | ICD-10-CM

## 2021-02-02 DIAGNOSIS — Z79.2 RECEIVING INTRAVENOUS ANTIBIOTIC TREATMENT AS OUTPATIENT: ICD-10-CM

## 2021-02-02 PROCEDURE — 1126F PR PAIN SEVERITY QUANTIFIED, NO PAIN PRESENT: ICD-10-PCS | Mod: NTX,S$GLB,, | Performed by: INTERNAL MEDICINE

## 2021-02-02 PROCEDURE — 1159F MED LIST DOCD IN RCRD: CPT | Mod: NTX,S$GLB,, | Performed by: INTERNAL MEDICINE

## 2021-02-02 PROCEDURE — 3079F DIAST BP 80-89 MM HG: CPT | Mod: CPTII,NTX,S$GLB, | Performed by: INTERNAL MEDICINE

## 2021-02-02 PROCEDURE — 3288F FALL RISK ASSESSMENT DOCD: CPT | Mod: CPTII,NTX,S$GLB, | Performed by: INTERNAL MEDICINE

## 2021-02-02 PROCEDURE — 1101F PR PT FALLS ASSESS DOC 0-1 FALLS W/OUT INJ PAST YR: ICD-10-PCS | Mod: CPTII,NTX,S$GLB, | Performed by: INTERNAL MEDICINE

## 2021-02-02 PROCEDURE — 99214 PR OFFICE/OUTPT VISIT, EST, LEVL IV, 30-39 MIN: ICD-10-PCS | Mod: NTX,S$GLB,, | Performed by: INTERNAL MEDICINE

## 2021-02-02 PROCEDURE — 1159F PR MEDICATION LIST DOCUMENTED IN MEDICAL RECORD: ICD-10-PCS | Mod: NTX,S$GLB,, | Performed by: INTERNAL MEDICINE

## 2021-02-02 PROCEDURE — 3079F PR MOST RECENT DIASTOLIC BLOOD PRESSURE 80-89 MM HG: ICD-10-PCS | Mod: CPTII,NTX,S$GLB, | Performed by: INTERNAL MEDICINE

## 2021-02-02 PROCEDURE — 1101F PT FALLS ASSESS-DOCD LE1/YR: CPT | Mod: CPTII,NTX,S$GLB, | Performed by: INTERNAL MEDICINE

## 2021-02-02 PROCEDURE — 3008F BODY MASS INDEX DOCD: CPT | Mod: CPTII,NTX,S$GLB, | Performed by: INTERNAL MEDICINE

## 2021-02-02 PROCEDURE — 3077F SYST BP >= 140 MM HG: CPT | Mod: CPTII,NTX,S$GLB, | Performed by: INTERNAL MEDICINE

## 2021-02-02 PROCEDURE — 99999 PR PBB SHADOW E&M-EST. PATIENT-LVL III: CPT | Mod: PBBFAC,TXP,, | Performed by: INTERNAL MEDICINE

## 2021-02-02 PROCEDURE — 99214 OFFICE O/P EST MOD 30 MIN: CPT | Mod: NTX,S$GLB,, | Performed by: INTERNAL MEDICINE

## 2021-02-02 PROCEDURE — 99999 PR PBB SHADOW E&M-EST. PATIENT-LVL III: ICD-10-PCS | Mod: PBBFAC,TXP,, | Performed by: INTERNAL MEDICINE

## 2021-02-02 PROCEDURE — 1126F AMNT PAIN NOTED NONE PRSNT: CPT | Mod: NTX,S$GLB,, | Performed by: INTERNAL MEDICINE

## 2021-02-02 PROCEDURE — 3077F PR MOST RECENT SYSTOLIC BLOOD PRESSURE >= 140 MM HG: ICD-10-PCS | Mod: CPTII,NTX,S$GLB, | Performed by: INTERNAL MEDICINE

## 2021-02-02 PROCEDURE — 3288F PR FALLS RISK ASSESSMENT DOCUMENTED: ICD-10-PCS | Mod: CPTII,NTX,S$GLB, | Performed by: INTERNAL MEDICINE

## 2021-02-02 PROCEDURE — 3008F PR BODY MASS INDEX (BMI) DOCUMENTED: ICD-10-PCS | Mod: CPTII,NTX,S$GLB, | Performed by: INTERNAL MEDICINE

## 2021-02-03 ENCOUNTER — TELEPHONE (OUTPATIENT)
Dept: ENDOCRINOLOGY | Facility: CLINIC | Age: 66
End: 2021-02-03

## 2021-02-03 ENCOUNTER — OFFICE VISIT (OUTPATIENT)
Dept: ENDOCRINOLOGY | Facility: CLINIC | Age: 66
End: 2021-02-03
Payer: MEDICARE

## 2021-02-03 VITALS
HEIGHT: 72 IN | WEIGHT: 202.81 LBS | OXYGEN SATURATION: 99 % | SYSTOLIC BLOOD PRESSURE: 153 MMHG | DIASTOLIC BLOOD PRESSURE: 71 MMHG | HEART RATE: 102 BPM | BODY MASS INDEX: 27.47 KG/M2

## 2021-02-03 DIAGNOSIS — R20.8 OTHER DISTURBANCES OF SKIN SENSATION (CODE): ICD-10-CM

## 2021-02-03 DIAGNOSIS — Z79.4 TYPE 2 DIABETES MELLITUS WITHOUT COMPLICATION, WITH LONG-TERM CURRENT USE OF INSULIN: Primary | ICD-10-CM

## 2021-02-03 DIAGNOSIS — E78.5 HYPERLIPIDEMIA, UNSPECIFIED HYPERLIPIDEMIA TYPE: ICD-10-CM

## 2021-02-03 DIAGNOSIS — E11.42 DIABETIC POLYNEUROPATHY ASSOCIATED WITH TYPE 2 DIABETES MELLITUS: ICD-10-CM

## 2021-02-03 DIAGNOSIS — I10 ESSENTIAL HYPERTENSION: ICD-10-CM

## 2021-02-03 DIAGNOSIS — E11.9 TYPE 2 DIABETES MELLITUS WITHOUT COMPLICATION, WITH LONG-TERM CURRENT USE OF INSULIN: Primary | ICD-10-CM

## 2021-02-03 DIAGNOSIS — E66.3 OVERWEIGHT (BMI 25.0-29.9): ICD-10-CM

## 2021-02-03 PROCEDURE — 3288F FALL RISK ASSESSMENT DOCD: CPT | Mod: CPTII,NTX,S$GLB, | Performed by: INTERNAL MEDICINE

## 2021-02-03 PROCEDURE — 3008F PR BODY MASS INDEX (BMI) DOCUMENTED: ICD-10-PCS | Mod: CPTII,NTX,S$GLB, | Performed by: INTERNAL MEDICINE

## 2021-02-03 PROCEDURE — 1126F AMNT PAIN NOTED NONE PRSNT: CPT | Mod: NTX,S$GLB,, | Performed by: INTERNAL MEDICINE

## 2021-02-03 PROCEDURE — 1101F PR PT FALLS ASSESS DOC 0-1 FALLS W/OUT INJ PAST YR: ICD-10-PCS | Mod: CPTII,NTX,S$GLB, | Performed by: INTERNAL MEDICINE

## 2021-02-03 PROCEDURE — 99215 PR OFFICE/OUTPT VISIT, EST, LEVL V, 40-54 MIN: ICD-10-PCS | Mod: NTX,S$GLB,, | Performed by: INTERNAL MEDICINE

## 2021-02-03 PROCEDURE — 99215 OFFICE O/P EST HI 40 MIN: CPT | Mod: NTX,S$GLB,, | Performed by: INTERNAL MEDICINE

## 2021-02-03 PROCEDURE — 3008F BODY MASS INDEX DOCD: CPT | Mod: CPTII,NTX,S$GLB, | Performed by: INTERNAL MEDICINE

## 2021-02-03 PROCEDURE — 3288F PR FALLS RISK ASSESSMENT DOCUMENTED: ICD-10-PCS | Mod: CPTII,NTX,S$GLB, | Performed by: INTERNAL MEDICINE

## 2021-02-03 PROCEDURE — 1126F PR PAIN SEVERITY QUANTIFIED, NO PAIN PRESENT: ICD-10-PCS | Mod: NTX,S$GLB,, | Performed by: INTERNAL MEDICINE

## 2021-02-03 PROCEDURE — 1101F PT FALLS ASSESS-DOCD LE1/YR: CPT | Mod: CPTII,NTX,S$GLB, | Performed by: INTERNAL MEDICINE

## 2021-02-03 RX ORDER — INSULIN PUMP SYRINGE, 3 ML
EACH MISCELLANEOUS
Qty: 1 EACH | Refills: 0 | Status: SHIPPED | OUTPATIENT
Start: 2021-02-03 | End: 2021-02-05 | Stop reason: SDUPTHER

## 2021-02-03 RX ORDER — LANCETS
EACH MISCELLANEOUS
Qty: 100 EACH | Refills: 11 | Status: SHIPPED | OUTPATIENT
Start: 2021-02-03 | End: 2021-02-05 | Stop reason: SDUPTHER

## 2021-02-04 ENCOUNTER — PATIENT MESSAGE (OUTPATIENT)
Dept: ENDOCRINOLOGY | Facility: CLINIC | Age: 66
End: 2021-02-04

## 2021-02-04 ENCOUNTER — PATIENT MESSAGE (OUTPATIENT)
Dept: TRANSPLANT | Facility: CLINIC | Age: 66
End: 2021-02-04

## 2021-02-04 ENCOUNTER — OFFICE VISIT (OUTPATIENT)
Dept: INTERVENTIONAL RADIOLOGY/VASCULAR | Facility: CLINIC | Age: 66
End: 2021-02-04
Payer: MEDICARE

## 2021-02-04 VITALS
HEIGHT: 72 IN | HEART RATE: 95 BPM | SYSTOLIC BLOOD PRESSURE: 145 MMHG | DIASTOLIC BLOOD PRESSURE: 71 MMHG | WEIGHT: 204.56 LBS | BODY MASS INDEX: 27.71 KG/M2

## 2021-02-04 DIAGNOSIS — C22.0 HCC (HEPATOCELLULAR CARCINOMA): Primary | ICD-10-CM

## 2021-02-04 PROCEDURE — 3077F PR MOST RECENT SYSTOLIC BLOOD PRESSURE >= 140 MM HG: ICD-10-PCS | Mod: CPTII,NTX,S$GLB, | Performed by: FAMILY MEDICINE

## 2021-02-04 PROCEDURE — 3078F DIAST BP <80 MM HG: CPT | Mod: CPTII,NTX,S$GLB, | Performed by: FAMILY MEDICINE

## 2021-02-04 PROCEDURE — 99999 PR PBB SHADOW E&M-EST. PATIENT-LVL III: CPT | Mod: PBBFAC,TXP,, | Performed by: FAMILY MEDICINE

## 2021-02-04 PROCEDURE — 3008F PR BODY MASS INDEX (BMI) DOCUMENTED: ICD-10-PCS | Mod: CPTII,NTX,S$GLB, | Performed by: FAMILY MEDICINE

## 2021-02-04 PROCEDURE — 1159F MED LIST DOCD IN RCRD: CPT | Mod: NTX,S$GLB,, | Performed by: FAMILY MEDICINE

## 2021-02-04 PROCEDURE — 99213 OFFICE O/P EST LOW 20 MIN: CPT | Mod: NTX,S$GLB,, | Performed by: FAMILY MEDICINE

## 2021-02-04 PROCEDURE — 1159F PR MEDICATION LIST DOCUMENTED IN MEDICAL RECORD: ICD-10-PCS | Mod: NTX,S$GLB,, | Performed by: FAMILY MEDICINE

## 2021-02-04 PROCEDURE — 99213 PR OFFICE/OUTPT VISIT, EST, LEVL III, 20-29 MIN: ICD-10-PCS | Mod: NTX,S$GLB,, | Performed by: FAMILY MEDICINE

## 2021-02-04 PROCEDURE — 99999 PR PBB SHADOW E&M-EST. PATIENT-LVL III: ICD-10-PCS | Mod: PBBFAC,TXP,, | Performed by: FAMILY MEDICINE

## 2021-02-04 PROCEDURE — 3077F SYST BP >= 140 MM HG: CPT | Mod: CPTII,NTX,S$GLB, | Performed by: FAMILY MEDICINE

## 2021-02-04 PROCEDURE — 3078F PR MOST RECENT DIASTOLIC BLOOD PRESSURE < 80 MM HG: ICD-10-PCS | Mod: CPTII,NTX,S$GLB, | Performed by: FAMILY MEDICINE

## 2021-02-04 PROCEDURE — 3008F BODY MASS INDEX DOCD: CPT | Mod: CPTII,NTX,S$GLB, | Performed by: FAMILY MEDICINE

## 2021-02-05 ENCOUNTER — TELEPHONE (OUTPATIENT)
Dept: ENDOCRINOLOGY | Facility: CLINIC | Age: 66
End: 2021-02-05

## 2021-02-05 DIAGNOSIS — Z79.4 TYPE 2 DIABETES MELLITUS WITHOUT COMPLICATION, WITH LONG-TERM CURRENT USE OF INSULIN: ICD-10-CM

## 2021-02-05 DIAGNOSIS — E11.9 TYPE 2 DIABETES MELLITUS WITHOUT COMPLICATION, WITH LONG-TERM CURRENT USE OF INSULIN: ICD-10-CM

## 2021-02-05 RX ORDER — INSULIN PUMP SYRINGE, 3 ML
EACH MISCELLANEOUS
Qty: 1 EACH | Refills: 0 | Status: SHIPPED | OUTPATIENT
Start: 2021-02-05 | End: 2021-07-16 | Stop reason: SDUPTHER

## 2021-02-05 RX ORDER — LANCETS
EACH MISCELLANEOUS
Qty: 100 EACH | Refills: 11 | Status: SHIPPED | OUTPATIENT
Start: 2021-02-05 | End: 2021-07-09 | Stop reason: SDUPTHER

## 2021-02-09 ENCOUNTER — TELEPHONE (OUTPATIENT)
Dept: TRANSPLANT | Facility: CLINIC | Age: 66
End: 2021-02-09

## 2021-02-09 ENCOUNTER — CONFERENCE (OUTPATIENT)
Dept: TRANSPLANT | Facility: CLINIC | Age: 66
End: 2021-02-09

## 2021-02-09 DIAGNOSIS — C22.0 HCC (HEPATOCELLULAR CARCINOMA): Primary | ICD-10-CM

## 2021-02-09 DIAGNOSIS — K75.81 NASH (NONALCOHOLIC STEATOHEPATITIS): ICD-10-CM

## 2021-02-09 DIAGNOSIS — R91.1 SOLITARY PULMONARY NODULE: ICD-10-CM

## 2021-02-09 DIAGNOSIS — C24.9 MALIGNANT NEOPLASM OF BILIARY TRACT, UNSPECIFIED: ICD-10-CM

## 2021-02-09 DIAGNOSIS — Z01.818 PRE-TRANSPLANT EVALUATION FOR LIVER TRANSPLANT: ICD-10-CM

## 2021-02-10 ENCOUNTER — EXTERNAL HOME HEALTH (OUTPATIENT)
Dept: HOME HEALTH SERVICES | Facility: HOSPITAL | Age: 66
End: 2021-02-10
Payer: MEDICARE

## 2021-02-11 ENCOUNTER — TELEPHONE (OUTPATIENT)
Dept: TRANSPLANT | Facility: CLINIC | Age: 66
End: 2021-02-11

## 2021-02-15 ENCOUNTER — PATIENT MESSAGE (OUTPATIENT)
Dept: ENDOCRINOLOGY | Facility: CLINIC | Age: 66
End: 2021-02-15

## 2021-02-17 RX ORDER — GABAPENTIN 300 MG/1
300 CAPSULE ORAL 3 TIMES DAILY PRN
Qty: 90 CAPSULE | Refills: 5 | Status: SHIPPED | OUTPATIENT
Start: 2021-02-17 | End: 2021-03-22 | Stop reason: SDUPTHER

## 2021-02-19 ENCOUNTER — OFFICE VISIT (OUTPATIENT)
Dept: PSYCHIATRY | Facility: CLINIC | Age: 66
End: 2021-02-19
Payer: MEDICARE

## 2021-02-19 ENCOUNTER — TELEPHONE (OUTPATIENT)
Dept: TRANSPLANT | Facility: CLINIC | Age: 66
End: 2021-02-19

## 2021-02-19 DIAGNOSIS — F32.89 OTHER DEPRESSION: ICD-10-CM

## 2021-02-19 DIAGNOSIS — F32.A DEPRESSION, UNSPECIFIED DEPRESSION TYPE: ICD-10-CM

## 2021-02-19 DIAGNOSIS — C22.0 HCC (HEPATOCELLULAR CARCINOMA): ICD-10-CM

## 2021-02-19 DIAGNOSIS — Z01.818 PRE-TRANSPLANT EVALUATION FOR LIVER TRANSPLANT: Primary | ICD-10-CM

## 2021-02-19 DIAGNOSIS — F41.9 ANXIETY: Primary | ICD-10-CM

## 2021-02-19 PROCEDURE — 90832 PSYTX W PT 30 MINUTES: CPT | Mod: S$GLB,TXP,, | Performed by: PSYCHOLOGIST

## 2021-02-19 PROCEDURE — 99999 PR PBB SHADOW E&M-EST. PATIENT-LVL II: ICD-10-PCS | Mod: PBBFAC,TXP,, | Performed by: PSYCHOLOGIST

## 2021-02-19 PROCEDURE — 90832 PR PSYCHOTHERAPY W/PATIENT, 30 MIN: ICD-10-PCS | Mod: S$GLB,TXP,, | Performed by: PSYCHOLOGIST

## 2021-02-19 PROCEDURE — 99999 PR PBB SHADOW E&M-EST. PATIENT-LVL II: CPT | Mod: PBBFAC,TXP,, | Performed by: PSYCHOLOGIST

## 2021-02-22 ENCOUNTER — PATIENT MESSAGE (OUTPATIENT)
Dept: TRANSPLANT | Facility: CLINIC | Age: 66
End: 2021-02-22

## 2021-02-24 ENCOUNTER — TELEPHONE (OUTPATIENT)
Dept: TRANSPLANT | Facility: CLINIC | Age: 66
End: 2021-02-24

## 2021-02-24 ENCOUNTER — PATIENT MESSAGE (OUTPATIENT)
Dept: TRANSPLANT | Facility: CLINIC | Age: 66
End: 2021-02-24

## 2021-02-24 ENCOUNTER — OFFICE VISIT (OUTPATIENT)
Dept: TRANSPLANT | Facility: CLINIC | Age: 66
End: 2021-02-24
Payer: MEDICARE

## 2021-02-24 DIAGNOSIS — K75.81 NASH (NONALCOHOLIC STEATOHEPATITIS): ICD-10-CM

## 2021-02-24 DIAGNOSIS — C22.0 HCC (HEPATOCELLULAR CARCINOMA): ICD-10-CM

## 2021-02-24 DIAGNOSIS — R16.0 LIVER MASS: Primary | ICD-10-CM

## 2021-02-24 PROCEDURE — 1159F PR MEDICATION LIST DOCUMENTED IN MEDICAL RECORD: ICD-10-PCS | Mod: 95,TXP,, | Performed by: INTERNAL MEDICINE

## 2021-02-24 PROCEDURE — 1159F MED LIST DOCD IN RCRD: CPT | Mod: 95,TXP,, | Performed by: INTERNAL MEDICINE

## 2021-02-24 PROCEDURE — 99215 OFFICE O/P EST HI 40 MIN: CPT | Mod: 95,TXP,, | Performed by: INTERNAL MEDICINE

## 2021-02-24 PROCEDURE — 99215 PR OFFICE/OUTPT VISIT, EST, LEVL V, 40-54 MIN: ICD-10-PCS | Mod: 95,TXP,, | Performed by: INTERNAL MEDICINE

## 2021-02-25 ENCOUNTER — HOSPITAL ENCOUNTER (OUTPATIENT)
Dept: RADIOLOGY | Facility: HOSPITAL | Age: 66
Discharge: HOME | End: 2021-02-25
Attending: INTERNAL MEDICINE
Payer: MEDICARE

## 2021-02-25 ENCOUNTER — CLINICAL SUPPORT (OUTPATIENT)
Dept: TRANSPLANT | Facility: CLINIC | Age: 66
End: 2021-02-25
Payer: MEDICARE

## 2021-02-25 ENCOUNTER — INITIAL CONSULT (OUTPATIENT)
Dept: TRANSPLANT | Facility: CLINIC | Age: 66
End: 2021-02-25
Payer: MEDICARE

## 2021-02-25 VITALS
WEIGHT: 214.06 LBS | HEIGHT: 71 IN | DIASTOLIC BLOOD PRESSURE: 74 MMHG | RESPIRATION RATE: 16 BRPM | HEART RATE: 96 BPM | SYSTOLIC BLOOD PRESSURE: 116 MMHG | BODY MASS INDEX: 29.97 KG/M2 | OXYGEN SATURATION: 99 % | TEMPERATURE: 98 F

## 2021-02-25 VITALS
HEART RATE: 96 BPM | RESPIRATION RATE: 16 BRPM | DIASTOLIC BLOOD PRESSURE: 74 MMHG | OXYGEN SATURATION: 99 % | BODY MASS INDEX: 29.97 KG/M2 | SYSTOLIC BLOOD PRESSURE: 116 MMHG | TEMPERATURE: 98 F | WEIGHT: 214.06 LBS | HEIGHT: 71 IN

## 2021-02-25 DIAGNOSIS — K74.60 HEPATIC CIRRHOSIS, UNSPECIFIED HEPATIC CIRRHOSIS TYPE, UNSPECIFIED WHETHER ASCITES PRESENT: ICD-10-CM

## 2021-02-25 DIAGNOSIS — C22.0 HEPATOCELLULAR CARCINOMA: ICD-10-CM

## 2021-02-25 DIAGNOSIS — E11.40 TYPE 2 DIABETES MELLITUS WITH DIABETIC NEUROPATHY, UNSPECIFIED WHETHER LONG TERM INSULIN USE: ICD-10-CM

## 2021-02-25 DIAGNOSIS — Z01.818 PRE-TRANSPLANT EVALUATION FOR LIVER TRANSPLANT: ICD-10-CM

## 2021-02-25 DIAGNOSIS — K76.6 PORTAL HYPERTENSION: ICD-10-CM

## 2021-02-25 DIAGNOSIS — C44.90 SKIN CANCER: ICD-10-CM

## 2021-02-25 DIAGNOSIS — K75.81 NASH (NONALCOHOLIC STEATOHEPATITIS): ICD-10-CM

## 2021-02-25 DIAGNOSIS — C22.0 HCC (HEPATOCELLULAR CARCINOMA): ICD-10-CM

## 2021-02-25 LAB
AMPHET+METHAMPHET UR QL: NEGATIVE
BARBITURATES UR QL SCN>200 NG/ML: NEGATIVE
BENZODIAZ UR QL SCN>200 NG/ML: NEGATIVE
BILIRUB UR QL STRIP: NEGATIVE
BZE UR QL SCN: NEGATIVE
CANNABINOIDS UR QL SCN: NORMAL
CLARITY UR REFRACT.AUTO: CLEAR
COLOR UR AUTO: YELLOW
CREAT UR-MCNC: 109 MG/DL (ref 23–375)
ETHANOL UR-MCNC: <10 MG/DL
GLUCOSE UR QL STRIP: NEGATIVE
HGB UR QL STRIP: NEGATIVE
KETONES UR QL STRIP: NEGATIVE
LEUKOCYTE ESTERASE UR QL STRIP: NEGATIVE
METHADONE UR QL SCN>300 NG/ML: NEGATIVE
NITRITE UR QL STRIP: NEGATIVE
OPIATES UR QL SCN: NEGATIVE
PCP UR QL SCN>25 NG/ML: NEGATIVE
PH UR STRIP: 5 [PH] (ref 5–8)
PROT UR QL STRIP: NEGATIVE
SP GR UR STRIP: 1.02 (ref 1–1.03)
TOXICOLOGY INFORMATION: NORMAL
URN SPEC COLLECT METH UR: NORMAL

## 2021-02-25 PROCEDURE — 71046 X-RAY EXAM CHEST 2 VIEWS: CPT | Mod: 26,TXP,, | Performed by: RADIOLOGY

## 2021-02-25 PROCEDURE — 81003 URINALYSIS AUTO W/O SCOPE: CPT | Mod: TXP

## 2021-02-25 PROCEDURE — 3044F PR MOST RECENT HEMOGLOBIN A1C LEVEL <7.0%: ICD-10-PCS | Mod: CPTII,S$GLB,TXP, | Performed by: SURGERY

## 2021-02-25 PROCEDURE — 3078F PR MOST RECENT DIASTOLIC BLOOD PRESSURE < 80 MM HG: ICD-10-PCS | Mod: CPTII,S$GLB,TXP, | Performed by: SURGERY

## 2021-02-25 PROCEDURE — 3074F SYST BP LT 130 MM HG: CPT | Mod: CPTII,S$GLB,TXP, | Performed by: SURGERY

## 2021-02-25 PROCEDURE — 3044F HG A1C LEVEL LT 7.0%: CPT | Mod: CPTII,S$GLB,TXP, | Performed by: SURGERY

## 2021-02-25 PROCEDURE — 99244 OFF/OP CNSLTJ NEW/EST MOD 40: CPT | Mod: S$GLB,TXP,, | Performed by: SURGERY

## 2021-02-25 PROCEDURE — 99999 PR PBB SHADOW E&M-EST. PATIENT-LVL III: CPT | Mod: PBBFAC,TXP,,

## 2021-02-25 PROCEDURE — 99244 PR OFFICE CONSULTATION,LEVEL IV: ICD-10-PCS | Mod: S$GLB,TXP,, | Performed by: SURGERY

## 2021-02-25 PROCEDURE — 71046 XR CHEST PA AND LATERAL: ICD-10-PCS | Mod: 26,TXP,, | Performed by: RADIOLOGY

## 2021-02-25 PROCEDURE — 3078F DIAST BP <80 MM HG: CPT | Mod: CPTII,S$GLB,TXP, | Performed by: SURGERY

## 2021-02-25 PROCEDURE — 1159F MED LIST DOCD IN RCRD: CPT | Mod: S$GLB,TXP,, | Performed by: SURGERY

## 2021-02-25 PROCEDURE — 97802 MEDICAL NUTRITION INDIV IN: CPT | Mod: S$GLB,TXP,, | Performed by: DIETITIAN, REGISTERED

## 2021-02-25 PROCEDURE — 99999 PR PBB SHADOW E&M-EST. PATIENT-LVL III: ICD-10-PCS | Mod: PBBFAC,TXP,,

## 2021-02-25 PROCEDURE — 99214 OFFICE O/P EST MOD 30 MIN: CPT | Mod: S$GLB,TXP,, | Performed by: SURGERY

## 2021-02-25 PROCEDURE — 3074F PR MOST RECENT SYSTOLIC BLOOD PRESSURE < 130 MM HG: ICD-10-PCS | Mod: CPTII,S$GLB,TXP, | Performed by: SURGERY

## 2021-02-25 PROCEDURE — 80307 DRUG TEST PRSMV CHEM ANLYZR: CPT | Mod: TXP

## 2021-02-25 PROCEDURE — 99214 PR OFFICE/OUTPT VISIT, EST, LEVL IV, 30-39 MIN: ICD-10-PCS | Mod: S$GLB,TXP,, | Performed by: SURGERY

## 2021-02-25 PROCEDURE — 97802 PR MED NUTR THER, 1ST, INDIV, EA 15 MIN: ICD-10-PCS | Mod: S$GLB,TXP,, | Performed by: DIETITIAN, REGISTERED

## 2021-02-25 PROCEDURE — 99999 PR PBB SHADOW E&M-EST. PATIENT-LVL III: CPT | Mod: PBBFAC,TXP,, | Performed by: SURGERY

## 2021-02-25 PROCEDURE — 99999 PR PBB SHADOW E&M-EST. PATIENT-LVL III: ICD-10-PCS | Mod: PBBFAC,TXP,, | Performed by: SURGERY

## 2021-02-25 PROCEDURE — 71046 X-RAY EXAM CHEST 2 VIEWS: CPT | Mod: TC,FY,TXP

## 2021-02-25 PROCEDURE — 1159F PR MEDICATION LIST DOCUMENTED IN MEDICAL RECORD: ICD-10-PCS | Mod: S$GLB,TXP,, | Performed by: SURGERY

## 2021-02-26 ENCOUNTER — HOSPITAL ENCOUNTER (OUTPATIENT)
Dept: RADIOLOGY | Facility: HOSPITAL | Age: 66
Discharge: HOME | End: 2021-02-26
Attending: INTERNAL MEDICINE
Payer: MEDICARE

## 2021-02-26 DIAGNOSIS — K75.0 ABSCESS OF LIVER: ICD-10-CM

## 2021-02-26 PROCEDURE — 25500020 PHARM REV CODE 255: Mod: TXP | Performed by: INTERNAL MEDICINE

## 2021-02-26 PROCEDURE — 74177 CT ABD & PELVIS W/CONTRAST: CPT | Mod: 26,TXP,, | Performed by: RADIOLOGY

## 2021-02-26 PROCEDURE — 74177 CT ABD & PELVIS W/CONTRAST: CPT | Mod: TC,TXP

## 2021-02-26 PROCEDURE — 74177 CT ABDOMEN PELVIS WITH CONTRAST: ICD-10-PCS | Mod: 26,TXP,, | Performed by: RADIOLOGY

## 2021-02-26 RX ADMIN — IOHEXOL 100 ML: 350 INJECTION, SOLUTION INTRAVENOUS at 11:02

## 2021-03-01 ENCOUNTER — TELEPHONE (OUTPATIENT)
Dept: INFECTIOUS DISEASES | Facility: CLINIC | Age: 66
End: 2021-03-01

## 2021-03-01 ENCOUNTER — HOSPITAL ENCOUNTER (OUTPATIENT)
Dept: RADIOLOGY | Facility: HOSPITAL | Age: 66
Discharge: HOME | End: 2021-03-01
Attending: INTERNAL MEDICINE
Payer: MEDICARE

## 2021-03-01 ENCOUNTER — TELEPHONE (OUTPATIENT)
Dept: TRANSPLANT | Facility: CLINIC | Age: 66
End: 2021-03-01

## 2021-03-01 DIAGNOSIS — C22.0 HEPATOCELLULAR CARCINOMA: ICD-10-CM

## 2021-03-01 DIAGNOSIS — Z01.818 PRE-TRANSPLANT EVALUATION FOR LIVER TRANSPLANT: ICD-10-CM

## 2021-03-01 DIAGNOSIS — K75.81 NASH (NONALCOHOLIC STEATOHEPATITIS): ICD-10-CM

## 2021-03-01 DIAGNOSIS — C44.90 SKIN CANCER: ICD-10-CM

## 2021-03-01 DIAGNOSIS — E11.40 TYPE 2 DIABETES MELLITUS WITH DIABETIC NEUROPATHY, UNSPECIFIED WHETHER LONG TERM INSULIN USE: ICD-10-CM

## 2021-03-01 DIAGNOSIS — K74.60 HEPATIC CIRRHOSIS, UNSPECIFIED HEPATIC CIRRHOSIS TYPE, UNSPECIFIED WHETHER ASCITES PRESENT: ICD-10-CM

## 2021-03-01 DIAGNOSIS — K76.6 PORTAL HYPERTENSION: ICD-10-CM

## 2021-03-01 PROCEDURE — 93975 US ABDOMEN COMP WITH DOPPLER_PRE LIVER TRANSPLANT: ICD-10-PCS | Mod: 26,TXP,, | Performed by: RADIOLOGY

## 2021-03-01 PROCEDURE — 93975 VASCULAR STUDY: CPT | Mod: 26,TXP,, | Performed by: RADIOLOGY

## 2021-03-01 PROCEDURE — 76700 US ABDOMEN COMP WITH DOPPLER_PRE LIVER TRANSPLANT: ICD-10-PCS | Mod: 26,TXP,, | Performed by: RADIOLOGY

## 2021-03-01 PROCEDURE — 76700 US EXAM ABDOM COMPLETE: CPT | Mod: 26,TXP,, | Performed by: RADIOLOGY

## 2021-03-01 PROCEDURE — 93975 VASCULAR STUDY: CPT | Mod: TC,TXP

## 2021-03-02 ENCOUNTER — TELEPHONE (OUTPATIENT)
Dept: INFECTIOUS DISEASES | Facility: CLINIC | Age: 66
End: 2021-03-02

## 2021-03-02 ENCOUNTER — TELEPHONE (OUTPATIENT)
Dept: TRANSPLANT | Facility: CLINIC | Age: 66
End: 2021-03-02

## 2021-03-03 ENCOUNTER — COMMITTEE REVIEW (OUTPATIENT)
Dept: TRANSPLANT | Facility: CLINIC | Age: 66
End: 2021-03-03

## 2021-03-03 ENCOUNTER — TELEPHONE (OUTPATIENT)
Dept: TRANSPLANT | Facility: CLINIC | Age: 66
End: 2021-03-03

## 2021-03-03 ENCOUNTER — PATIENT MESSAGE (OUTPATIENT)
Dept: TRANSPLANT | Facility: CLINIC | Age: 66
End: 2021-03-03

## 2021-03-09 ENCOUNTER — PATIENT MESSAGE (OUTPATIENT)
Dept: TRANSPLANT | Facility: CLINIC | Age: 66
End: 2021-03-09

## 2021-03-16 ENCOUNTER — PATIENT MESSAGE (OUTPATIENT)
Dept: TRANSPLANT | Facility: CLINIC | Age: 66
End: 2021-03-16

## 2021-03-16 ENCOUNTER — TELEPHONE (OUTPATIENT)
Dept: TRANSPLANT | Facility: CLINIC | Age: 66
End: 2021-03-16

## 2021-03-25 ENCOUNTER — OFFICE VISIT (OUTPATIENT)
Dept: HEPATOLOGY | Facility: CLINIC | Age: 66
End: 2021-03-25
Payer: MEDICARE

## 2021-03-25 ENCOUNTER — LAB VISIT (OUTPATIENT)
Dept: LAB | Facility: HOSPITAL | Age: 66
End: 2021-03-25
Attending: INTERNAL MEDICINE
Payer: MEDICARE

## 2021-03-25 ENCOUNTER — TELEPHONE (OUTPATIENT)
Dept: HEPATOLOGY | Facility: CLINIC | Age: 66
End: 2021-03-25

## 2021-03-25 VITALS
WEIGHT: 222.44 LBS | OXYGEN SATURATION: 97 % | DIASTOLIC BLOOD PRESSURE: 63 MMHG | SYSTOLIC BLOOD PRESSURE: 116 MMHG | TEMPERATURE: 98 F | HEIGHT: 72 IN | RESPIRATION RATE: 19 BRPM | HEART RATE: 94 BPM | BODY MASS INDEX: 30.13 KG/M2

## 2021-03-25 DIAGNOSIS — K75.81 NASH (NONALCOHOLIC STEATOHEPATITIS): ICD-10-CM

## 2021-03-25 DIAGNOSIS — R16.0 LIVER MASS: ICD-10-CM

## 2021-03-25 DIAGNOSIS — C22.0 HCC (HEPATOCELLULAR CARCINOMA): ICD-10-CM

## 2021-03-25 DIAGNOSIS — E78.5 HYPERLIPIDEMIA, UNSPECIFIED HYPERLIPIDEMIA TYPE: ICD-10-CM

## 2021-03-25 DIAGNOSIS — K74.60 HEPATIC CIRRHOSIS, UNSPECIFIED HEPATIC CIRRHOSIS TYPE, UNSPECIFIED WHETHER ASCITES PRESENT: ICD-10-CM

## 2021-03-25 DIAGNOSIS — C22.0 HCC (HEPATOCELLULAR CARCINOMA): Primary | ICD-10-CM

## 2021-03-25 DIAGNOSIS — K06.9 GUM DISEASE: ICD-10-CM

## 2021-03-25 DIAGNOSIS — Z01.818 PRE-TRANSPLANT EVALUATION FOR LIVER TRANSPLANT: ICD-10-CM

## 2021-03-25 DIAGNOSIS — E08.610 DIABETES MELLITUS DUE TO UNDERLYING CONDITION WITH DIABETIC NEUROPATHIC ARTHROPATHY: ICD-10-CM

## 2021-03-25 DIAGNOSIS — C44.90 SKIN CANCER: ICD-10-CM

## 2021-03-25 DIAGNOSIS — N20.0 KIDNEY STONES: ICD-10-CM

## 2021-03-25 DIAGNOSIS — I10 HYPERTENSION, UNSPECIFIED TYPE: ICD-10-CM

## 2021-03-25 DIAGNOSIS — K76.6 PORTAL HYPERTENSION: ICD-10-CM

## 2021-03-25 DIAGNOSIS — E13.65 OTHER SPECIFIED DIABETES MELLITUS WITH HYPERGLYCEMIA: ICD-10-CM

## 2021-03-25 PROBLEM — R10.11 RIGHT UPPER QUADRANT ABDOMINAL PAIN: Status: RESOLVED | Noted: 2021-01-15 | Resolved: 2021-03-25

## 2021-03-25 LAB
ALBUMIN SERPL BCP-MCNC: 3.7 G/DL (ref 3.5–5.2)
ALP SERPL-CCNC: 71 U/L (ref 55–135)
ALT SERPL W/O P-5'-P-CCNC: 16 U/L (ref 10–44)
ANION GAP SERPL CALC-SCNC: 10 MMOL/L (ref 8–16)
AST SERPL-CCNC: 21 U/L (ref 10–40)
BASOPHILS # BLD AUTO: 0.04 K/UL (ref 0–0.2)
BASOPHILS NFR BLD: 0.8 % (ref 0–1.9)
BILIRUB SERPL-MCNC: 0.5 MG/DL (ref 0.1–1)
BUN SERPL-MCNC: 15 MG/DL (ref 8–23)
CALCIUM SERPL-MCNC: 8.9 MG/DL (ref 8.7–10.5)
CHLORIDE SERPL-SCNC: 103 MMOL/L (ref 95–110)
CO2 SERPL-SCNC: 25 MMOL/L (ref 23–29)
CREAT SERPL-MCNC: 0.9 MG/DL (ref 0.5–1.4)
DIFFERENTIAL METHOD: ABNORMAL
EOSINOPHIL # BLD AUTO: 0.1 K/UL (ref 0–0.5)
EOSINOPHIL NFR BLD: 1.5 % (ref 0–8)
ERYTHROCYTE [DISTWIDTH] IN BLOOD BY AUTOMATED COUNT: 13.5 % (ref 11.5–14.5)
EST. GFR  (AFRICAN AMERICAN): >60 ML/MIN/1.73 M^2
EST. GFR  (NON AFRICAN AMERICAN): >60 ML/MIN/1.73 M^2
GLUCOSE SERPL-MCNC: 236 MG/DL (ref 70–110)
HCT VFR BLD AUTO: 43.7 % (ref 40–54)
HGB BLD-MCNC: 14.3 G/DL (ref 14–18)
IMM GRANULOCYTES # BLD AUTO: 0.01 K/UL (ref 0–0.04)
IMM GRANULOCYTES NFR BLD AUTO: 0.2 % (ref 0–0.5)
INR PPP: 1 (ref 0.8–1.2)
LYMPHOCYTES # BLD AUTO: 0.9 K/UL (ref 1–4.8)
LYMPHOCYTES NFR BLD: 18.4 % (ref 18–48)
MCH RBC QN AUTO: 30.3 PG (ref 27–31)
MCHC RBC AUTO-ENTMCNC: 32.7 G/DL (ref 32–36)
MCV RBC AUTO: 93 FL (ref 82–98)
MONOCYTES # BLD AUTO: 0.6 K/UL (ref 0.3–1)
MONOCYTES NFR BLD: 11.6 % (ref 4–15)
NEUTROPHILS # BLD AUTO: 3.2 K/UL (ref 1.8–7.7)
NEUTROPHILS NFR BLD: 67.5 % (ref 38–73)
NRBC BLD-RTO: 0 /100 WBC
PLATELET # BLD AUTO: 111 K/UL (ref 150–350)
PMV BLD AUTO: 10.9 FL (ref 9.2–12.9)
POTASSIUM SERPL-SCNC: 4.2 MMOL/L (ref 3.5–5.1)
PROT SERPL-MCNC: 7.4 G/DL (ref 6–8.4)
PROTHROMBIN TIME: 11.3 SEC (ref 9–12.5)
RBC # BLD AUTO: 4.72 M/UL (ref 4.6–6.2)
SODIUM SERPL-SCNC: 138 MMOL/L (ref 136–145)
WBC # BLD AUTO: 4.73 K/UL (ref 3.9–12.7)

## 2021-03-25 PROCEDURE — 3288F PR FALLS RISK ASSESSMENT DOCUMENTED: ICD-10-PCS | Mod: CPTII,S$GLB,TXP, | Performed by: INTERNAL MEDICINE

## 2021-03-25 PROCEDURE — 3078F DIAST BP <80 MM HG: CPT | Mod: CPTII,S$GLB,TXP, | Performed by: INTERNAL MEDICINE

## 2021-03-25 PROCEDURE — 99214 OFFICE O/P EST MOD 30 MIN: CPT | Mod: S$GLB,TXP,, | Performed by: INTERNAL MEDICINE

## 2021-03-25 PROCEDURE — 3008F BODY MASS INDEX DOCD: CPT | Mod: CPTII,S$GLB,TXP, | Performed by: INTERNAL MEDICINE

## 2021-03-25 PROCEDURE — 3078F PR MOST RECENT DIASTOLIC BLOOD PRESSURE < 80 MM HG: ICD-10-PCS | Mod: CPTII,S$GLB,TXP, | Performed by: INTERNAL MEDICINE

## 2021-03-25 PROCEDURE — 99999 PR PBB SHADOW E&M-EST. PATIENT-LVL V: CPT | Mod: PBBFAC,TXP,, | Performed by: INTERNAL MEDICINE

## 2021-03-25 PROCEDURE — 85025 COMPLETE CBC W/AUTO DIFF WBC: CPT | Mod: TXP | Performed by: INTERNAL MEDICINE

## 2021-03-25 PROCEDURE — 80053 COMPREHEN METABOLIC PANEL: CPT | Mod: TXP | Performed by: INTERNAL MEDICINE

## 2021-03-25 PROCEDURE — 99999 PR PBB SHADOW E&M-EST. PATIENT-LVL V: ICD-10-PCS | Mod: PBBFAC,TXP,, | Performed by: INTERNAL MEDICINE

## 2021-03-25 PROCEDURE — 3074F SYST BP LT 130 MM HG: CPT | Mod: CPTII,S$GLB,TXP, | Performed by: INTERNAL MEDICINE

## 2021-03-25 PROCEDURE — 1101F PT FALLS ASSESS-DOCD LE1/YR: CPT | Mod: CPTII,S$GLB,TXP, | Performed by: INTERNAL MEDICINE

## 2021-03-25 PROCEDURE — 1159F PR MEDICATION LIST DOCUMENTED IN MEDICAL RECORD: ICD-10-PCS | Mod: S$GLB,TXP,, | Performed by: INTERNAL MEDICINE

## 2021-03-25 PROCEDURE — 1126F AMNT PAIN NOTED NONE PRSNT: CPT | Mod: S$GLB,TXP,, | Performed by: INTERNAL MEDICINE

## 2021-03-25 PROCEDURE — 99214 PR OFFICE/OUTPT VISIT, EST, LEVL IV, 30-39 MIN: ICD-10-PCS | Mod: S$GLB,TXP,, | Performed by: INTERNAL MEDICINE

## 2021-03-25 PROCEDURE — 3074F PR MOST RECENT SYSTOLIC BLOOD PRESSURE < 130 MM HG: ICD-10-PCS | Mod: CPTII,S$GLB,TXP, | Performed by: INTERNAL MEDICINE

## 2021-03-25 PROCEDURE — 3008F PR BODY MASS INDEX (BMI) DOCUMENTED: ICD-10-PCS | Mod: CPTII,S$GLB,TXP, | Performed by: INTERNAL MEDICINE

## 2021-03-25 PROCEDURE — 3288F FALL RISK ASSESSMENT DOCD: CPT | Mod: CPTII,S$GLB,TXP, | Performed by: INTERNAL MEDICINE

## 2021-03-25 PROCEDURE — 1101F PR PT FALLS ASSESS DOC 0-1 FALLS W/OUT INJ PAST YR: ICD-10-PCS | Mod: CPTII,S$GLB,TXP, | Performed by: INTERNAL MEDICINE

## 2021-03-25 PROCEDURE — 36415 COLL VENOUS BLD VENIPUNCTURE: CPT | Mod: TXP | Performed by: INTERNAL MEDICINE

## 2021-03-25 PROCEDURE — 1126F PR PAIN SEVERITY QUANTIFIED, NO PAIN PRESENT: ICD-10-PCS | Mod: S$GLB,TXP,, | Performed by: INTERNAL MEDICINE

## 2021-03-25 PROCEDURE — 1159F MED LIST DOCD IN RCRD: CPT | Mod: S$GLB,TXP,, | Performed by: INTERNAL MEDICINE

## 2021-03-25 PROCEDURE — 85610 PROTHROMBIN TIME: CPT | Mod: TXP | Performed by: INTERNAL MEDICINE

## 2021-03-25 RX ORDER — TRAZODONE HYDROCHLORIDE 50 MG/1
50 TABLET ORAL NIGHTLY
COMMUNITY
End: 2021-10-04

## 2021-03-26 ENCOUNTER — PATIENT MESSAGE (OUTPATIENT)
Dept: TRANSPLANT | Facility: CLINIC | Age: 66
End: 2021-03-26

## 2021-03-29 ENCOUNTER — OFFICE VISIT (OUTPATIENT)
Dept: INFECTIOUS DISEASES | Facility: CLINIC | Age: 66
End: 2021-03-29
Payer: MEDICARE

## 2021-03-29 VITALS
HEIGHT: 72 IN | WEIGHT: 225.06 LBS | BODY MASS INDEX: 30.48 KG/M2 | DIASTOLIC BLOOD PRESSURE: 73 MMHG | HEART RATE: 94 BPM | SYSTOLIC BLOOD PRESSURE: 120 MMHG | TEMPERATURE: 98 F

## 2021-03-29 DIAGNOSIS — Z71.85 VACCINE COUNSELING: Primary | ICD-10-CM

## 2021-03-29 DIAGNOSIS — K76.6 PORTAL HYPERTENSION: ICD-10-CM

## 2021-03-29 DIAGNOSIS — E11.40 TYPE 2 DIABETES MELLITUS WITH DIABETIC NEUROPATHY, UNSPECIFIED WHETHER LONG TERM INSULIN USE: ICD-10-CM

## 2021-03-29 DIAGNOSIS — C22.0 HEPATOCELLULAR CARCINOMA: ICD-10-CM

## 2021-03-29 DIAGNOSIS — K74.60 HEPATIC CIRRHOSIS, UNSPECIFIED HEPATIC CIRRHOSIS TYPE, UNSPECIFIED WHETHER ASCITES PRESENT: ICD-10-CM

## 2021-03-29 DIAGNOSIS — K75.81 NASH (NONALCOHOLIC STEATOHEPATITIS): ICD-10-CM

## 2021-03-29 DIAGNOSIS — Z01.818 PRE-TRANSPLANT EVALUATION FOR LIVER TRANSPLANT: ICD-10-CM

## 2021-03-29 PROCEDURE — 1126F AMNT PAIN NOTED NONE PRSNT: CPT | Mod: S$GLB,TXP,, | Performed by: INTERNAL MEDICINE

## 2021-03-29 PROCEDURE — 1101F PR PT FALLS ASSESS DOC 0-1 FALLS W/OUT INJ PAST YR: ICD-10-PCS | Mod: CPTII,S$GLB,TXP, | Performed by: INTERNAL MEDICINE

## 2021-03-29 PROCEDURE — 3008F PR BODY MASS INDEX (BMI) DOCUMENTED: ICD-10-PCS | Mod: CPTII,S$GLB,TXP, | Performed by: INTERNAL MEDICINE

## 2021-03-29 PROCEDURE — 3074F PR MOST RECENT SYSTOLIC BLOOD PRESSURE < 130 MM HG: ICD-10-PCS | Mod: CPTII,S$GLB,TXP, | Performed by: INTERNAL MEDICINE

## 2021-03-29 PROCEDURE — 1101F PT FALLS ASSESS-DOCD LE1/YR: CPT | Mod: CPTII,S$GLB,TXP, | Performed by: INTERNAL MEDICINE

## 2021-03-29 PROCEDURE — 3288F PR FALLS RISK ASSESSMENT DOCUMENTED: ICD-10-PCS | Mod: CPTII,S$GLB,TXP, | Performed by: INTERNAL MEDICINE

## 2021-03-29 PROCEDURE — 1126F PR PAIN SEVERITY QUANTIFIED, NO PAIN PRESENT: ICD-10-PCS | Mod: S$GLB,TXP,, | Performed by: INTERNAL MEDICINE

## 2021-03-29 PROCEDURE — 1159F PR MEDICATION LIST DOCUMENTED IN MEDICAL RECORD: ICD-10-PCS | Mod: S$GLB,TXP,, | Performed by: INTERNAL MEDICINE

## 2021-03-29 PROCEDURE — 3008F BODY MASS INDEX DOCD: CPT | Mod: CPTII,S$GLB,TXP, | Performed by: INTERNAL MEDICINE

## 2021-03-29 PROCEDURE — 3044F PR MOST RECENT HEMOGLOBIN A1C LEVEL <7.0%: ICD-10-PCS | Mod: CPTII,S$GLB,TXP, | Performed by: INTERNAL MEDICINE

## 2021-03-29 PROCEDURE — 3078F PR MOST RECENT DIASTOLIC BLOOD PRESSURE < 80 MM HG: ICD-10-PCS | Mod: CPTII,S$GLB,TXP, | Performed by: INTERNAL MEDICINE

## 2021-03-29 PROCEDURE — 3288F FALL RISK ASSESSMENT DOCD: CPT | Mod: CPTII,S$GLB,TXP, | Performed by: INTERNAL MEDICINE

## 2021-03-29 PROCEDURE — 99215 PR OFFICE/OUTPT VISIT, EST, LEVL V, 40-54 MIN: ICD-10-PCS | Mod: S$GLB,TXP,, | Performed by: INTERNAL MEDICINE

## 2021-03-29 PROCEDURE — 3078F DIAST BP <80 MM HG: CPT | Mod: CPTII,S$GLB,TXP, | Performed by: INTERNAL MEDICINE

## 2021-03-29 PROCEDURE — 1159F MED LIST DOCD IN RCRD: CPT | Mod: S$GLB,TXP,, | Performed by: INTERNAL MEDICINE

## 2021-03-29 PROCEDURE — 3044F HG A1C LEVEL LT 7.0%: CPT | Mod: CPTII,S$GLB,TXP, | Performed by: INTERNAL MEDICINE

## 2021-03-29 PROCEDURE — 99999 PR PBB SHADOW E&M-EST. PATIENT-LVL V: ICD-10-PCS | Mod: PBBFAC,TXP,, | Performed by: INTERNAL MEDICINE

## 2021-03-29 PROCEDURE — 3074F SYST BP LT 130 MM HG: CPT | Mod: CPTII,S$GLB,TXP, | Performed by: INTERNAL MEDICINE

## 2021-03-29 PROCEDURE — 99215 OFFICE O/P EST HI 40 MIN: CPT | Mod: S$GLB,TXP,, | Performed by: INTERNAL MEDICINE

## 2021-03-29 PROCEDURE — 99999 PR PBB SHADOW E&M-EST. PATIENT-LVL V: CPT | Mod: PBBFAC,TXP,, | Performed by: INTERNAL MEDICINE

## 2021-03-30 ENCOUNTER — TELEPHONE (OUTPATIENT)
Dept: TRANSPLANT | Facility: CLINIC | Age: 66
End: 2021-03-30

## 2021-03-31 DIAGNOSIS — I73.9 PAD (PERIPHERAL ARTERY DISEASE): Primary | ICD-10-CM

## 2021-04-05 ENCOUNTER — PATIENT MESSAGE (OUTPATIENT)
Dept: HEPATOLOGY | Facility: CLINIC | Age: 66
End: 2021-04-05

## 2021-04-12 ENCOUNTER — CLINICAL SUPPORT (OUTPATIENT)
Dept: INFECTIOUS DISEASES | Facility: CLINIC | Age: 66
End: 2021-04-12
Payer: MEDICARE

## 2021-04-12 DIAGNOSIS — Z01.818 PRE-TRANSPLANT EVALUATION FOR LIVER TRANSPLANT: ICD-10-CM

## 2021-04-12 DIAGNOSIS — E11.40 TYPE 2 DIABETES MELLITUS WITH DIABETIC NEUROPATHY, UNSPECIFIED WHETHER LONG TERM INSULIN USE: ICD-10-CM

## 2021-04-12 DIAGNOSIS — C22.0 HEPATOCELLULAR CARCINOMA: ICD-10-CM

## 2021-04-12 DIAGNOSIS — Z71.85 VACCINE COUNSELING: ICD-10-CM

## 2021-04-12 DIAGNOSIS — K74.60 HEPATIC CIRRHOSIS, UNSPECIFIED HEPATIC CIRRHOSIS TYPE, UNSPECIFIED WHETHER ASCITES PRESENT: ICD-10-CM

## 2021-04-12 DIAGNOSIS — K76.6 PORTAL HYPERTENSION: ICD-10-CM

## 2021-04-12 DIAGNOSIS — K75.81 NASH (NONALCOHOLIC STEATOHEPATITIS): ICD-10-CM

## 2021-04-12 PROCEDURE — 90750 HZV VACC RECOMBINANT IM: CPT | Mod: S$GLB,TXP,, | Performed by: INTERNAL MEDICINE

## 2021-04-12 PROCEDURE — 90472 PNEUMOCOCCAL CONJUGATE VACCINE 13-VALENT LESS THAN 5YO & GREATER THAN: ICD-10-PCS | Mod: S$GLB,TXP,, | Performed by: INTERNAL MEDICINE

## 2021-04-12 PROCEDURE — 90670 PNEUMOCOCCAL CONJUGATE VACCINE 13-VALENT LESS THAN 5YO & GREATER THAN: ICD-10-PCS | Mod: S$GLB,TXP,, | Performed by: INTERNAL MEDICINE

## 2021-04-12 PROCEDURE — G0010 HEPATITIS A VACCINE ADULT IM: ICD-10-PCS | Mod: S$GLB,TXP,, | Performed by: INTERNAL MEDICINE

## 2021-04-12 PROCEDURE — G0009 ADMIN PNEUMOCOCCAL VACCINE: HCPCS | Mod: S$GLB,TXP,, | Performed by: INTERNAL MEDICINE

## 2021-04-12 PROCEDURE — 99999 PR PBB SHADOW E&M-EST. PATIENT-LVL III: CPT | Mod: PBBFAC,TXP,,

## 2021-04-12 PROCEDURE — 90670 PCV13 VACCINE IM: CPT | Mod: S$GLB,TXP,, | Performed by: INTERNAL MEDICINE

## 2021-04-12 PROCEDURE — 90750 ZOSTER RECOMBINANT VACCINE: ICD-10-PCS | Mod: S$GLB,TXP,, | Performed by: INTERNAL MEDICINE

## 2021-04-12 PROCEDURE — 99999 PR PBB SHADOW E&M-EST. PATIENT-LVL III: ICD-10-PCS | Mod: PBBFAC,TXP,,

## 2021-04-12 PROCEDURE — G0009 HEPATITIS B (RECOMBINANT) ADJUVANTED, 2 DOSE: ICD-10-PCS | Mod: S$GLB,TXP,, | Performed by: INTERNAL MEDICINE

## 2021-04-12 PROCEDURE — 90472 IMMUNIZATION ADMIN EACH ADD: CPT | Mod: S$GLB,TXP,, | Performed by: INTERNAL MEDICINE

## 2021-04-12 PROCEDURE — 90632 HEPATITIS A VACCINE ADULT IM: ICD-10-PCS | Mod: S$GLB,TXP,, | Performed by: INTERNAL MEDICINE

## 2021-04-12 PROCEDURE — 90632 HEPA VACCINE ADULT IM: CPT | Mod: S$GLB,TXP,, | Performed by: INTERNAL MEDICINE

## 2021-04-12 PROCEDURE — 90739 HEPATITIS B (RECOMBINANT) ADJUVANTED, 2 DOSE: ICD-10-PCS | Mod: S$GLB,TXP,, | Performed by: INTERNAL MEDICINE

## 2021-04-12 PROCEDURE — 90715 TDAP VACCINE 7 YRS/> IM: CPT | Mod: S$GLB,TXP,, | Performed by: INTERNAL MEDICINE

## 2021-04-12 PROCEDURE — G0010 ADMIN HEPATITIS B VACCINE: HCPCS | Mod: S$GLB,TXP,, | Performed by: INTERNAL MEDICINE

## 2021-04-12 PROCEDURE — 90715 TDAP VACCINE GREATER THAN OR EQUAL TO 7YO IM: ICD-10-PCS | Mod: S$GLB,TXP,, | Performed by: INTERNAL MEDICINE

## 2021-04-12 PROCEDURE — 90739 HEPB VACC 2/4 DOSE ADULT IM: CPT | Mod: S$GLB,TXP,, | Performed by: INTERNAL MEDICINE

## 2021-04-15 ENCOUNTER — PATIENT MESSAGE (OUTPATIENT)
Dept: RESEARCH | Facility: HOSPITAL | Age: 66
End: 2021-04-15

## 2021-04-22 ENCOUNTER — OFFICE VISIT (OUTPATIENT)
Dept: VASCULAR SURGERY | Facility: CLINIC | Age: 66
End: 2021-04-22
Attending: SURGERY
Payer: MEDICARE

## 2021-04-22 ENCOUNTER — HOSPITAL ENCOUNTER (OUTPATIENT)
Dept: VASCULAR SURGERY | Facility: CLINIC | Age: 66
Discharge: HOME | End: 2021-04-22
Attending: SURGERY
Payer: MEDICARE

## 2021-04-22 VITALS
SYSTOLIC BLOOD PRESSURE: 134 MMHG | HEART RATE: 106 BPM | BODY MASS INDEX: 30.16 KG/M2 | HEIGHT: 72 IN | DIASTOLIC BLOOD PRESSURE: 88 MMHG | TEMPERATURE: 97 F | WEIGHT: 222.69 LBS

## 2021-04-22 DIAGNOSIS — I73.9 PAD (PERIPHERAL ARTERY DISEASE): ICD-10-CM

## 2021-04-22 DIAGNOSIS — I73.9 CLAUDICATION: ICD-10-CM

## 2021-04-22 DIAGNOSIS — I73.9 PAD (PERIPHERAL ARTERY DISEASE): Primary | ICD-10-CM

## 2021-04-22 PROCEDURE — 1101F PR PT FALLS ASSESS DOC 0-1 FALLS W/OUT INJ PAST YR: ICD-10-PCS | Mod: CPTII,S$GLB,TXP, | Performed by: SURGERY

## 2021-04-22 PROCEDURE — 1101F PT FALLS ASSESS-DOCD LE1/YR: CPT | Mod: CPTII,S$GLB,TXP, | Performed by: SURGERY

## 2021-04-22 PROCEDURE — 93923 UPR/LXTR ART STDY 3+ LVLS: CPT | Mod: S$GLB,TXP,, | Performed by: SURGERY

## 2021-04-22 PROCEDURE — 3008F PR BODY MASS INDEX (BMI) DOCUMENTED: ICD-10-PCS | Mod: CPTII,S$GLB,TXP, | Performed by: SURGERY

## 2021-04-22 PROCEDURE — 3008F BODY MASS INDEX DOCD: CPT | Mod: CPTII,S$GLB,TXP, | Performed by: SURGERY

## 2021-04-22 PROCEDURE — 1126F PR PAIN SEVERITY QUANTIFIED, NO PAIN PRESENT: ICD-10-PCS | Mod: S$GLB,TXP,, | Performed by: SURGERY

## 2021-04-22 PROCEDURE — 99999 PR PBB SHADOW E&M-EST. PATIENT-LVL IV: ICD-10-PCS | Mod: PBBFAC,TXP,, | Performed by: SURGERY

## 2021-04-22 PROCEDURE — 99204 OFFICE O/P NEW MOD 45 MIN: CPT | Mod: S$GLB,TXP,, | Performed by: SURGERY

## 2021-04-22 PROCEDURE — 93925 PR DUPLEX LO EXTREM ART BILAT: ICD-10-PCS | Mod: S$GLB,TXP,, | Performed by: SURGERY

## 2021-04-22 PROCEDURE — 99204 PR OFFICE/OUTPT VISIT, NEW, LEVL IV, 45-59 MIN: ICD-10-PCS | Mod: S$GLB,TXP,, | Performed by: SURGERY

## 2021-04-22 PROCEDURE — 1159F MED LIST DOCD IN RCRD: CPT | Mod: S$GLB,TXP,, | Performed by: SURGERY

## 2021-04-22 PROCEDURE — 1159F PR MEDICATION LIST DOCUMENTED IN MEDICAL RECORD: ICD-10-PCS | Mod: S$GLB,TXP,, | Performed by: SURGERY

## 2021-04-22 PROCEDURE — 93925 LOWER EXTREMITY STUDY: CPT | Mod: S$GLB,TXP,, | Performed by: SURGERY

## 2021-04-22 PROCEDURE — 1126F AMNT PAIN NOTED NONE PRSNT: CPT | Mod: S$GLB,TXP,, | Performed by: SURGERY

## 2021-04-22 PROCEDURE — 99999 PR PBB SHADOW E&M-EST. PATIENT-LVL IV: CPT | Mod: PBBFAC,TXP,, | Performed by: SURGERY

## 2021-04-22 PROCEDURE — 3288F FALL RISK ASSESSMENT DOCD: CPT | Mod: CPTII,S$GLB,TXP, | Performed by: SURGERY

## 2021-04-22 PROCEDURE — 93923 PR NON-INVASIVE PHYSIOLOGIC STUDY EXTREMITY 3 LEVELS: ICD-10-PCS | Mod: S$GLB,TXP,, | Performed by: SURGERY

## 2021-04-22 PROCEDURE — 3288F PR FALLS RISK ASSESSMENT DOCUMENTED: ICD-10-PCS | Mod: CPTII,S$GLB,TXP, | Performed by: SURGERY

## 2021-04-23 ENCOUNTER — TELEPHONE (OUTPATIENT)
Dept: TRANSPLANT | Facility: CLINIC | Age: 66
End: 2021-04-23

## 2021-04-27 ENCOUNTER — PATIENT MESSAGE (OUTPATIENT)
Dept: PSYCHIATRY | Facility: CLINIC | Age: 66
End: 2021-04-27

## 2021-04-28 ENCOUNTER — TELEPHONE (OUTPATIENT)
Dept: TRANSPLANT | Facility: CLINIC | Age: 66
End: 2021-04-28

## 2021-04-28 ENCOUNTER — PATIENT MESSAGE (OUTPATIENT)
Dept: TRANSPLANT | Facility: CLINIC | Age: 66
End: 2021-04-28

## 2021-04-28 DIAGNOSIS — C22.0 HEPATOCELLULAR CARCINOMA: ICD-10-CM

## 2021-04-28 DIAGNOSIS — I35.0 AORTIC STENOSIS, MODERATE: ICD-10-CM

## 2021-04-28 DIAGNOSIS — I25.10 CORONARY ARTERY DISEASE, ANGINA PRESENCE UNSPECIFIED, UNSPECIFIED VESSEL OR LESION TYPE, UNSPECIFIED WHETHER NATIVE OR TRANSPLANTED HEART: ICD-10-CM

## 2021-04-28 DIAGNOSIS — K75.81 NASH (NONALCOHOLIC STEATOHEPATITIS): ICD-10-CM

## 2021-04-28 DIAGNOSIS — Z01.818 PRE-TRANSPLANT EVALUATION FOR LIVER TRANSPLANT: ICD-10-CM

## 2021-04-28 DIAGNOSIS — K74.60 HEPATIC CIRRHOSIS, UNSPECIFIED HEPATIC CIRRHOSIS TYPE, UNSPECIFIED WHETHER ASCITES PRESENT: Primary | ICD-10-CM

## 2021-04-30 ENCOUNTER — LAB VISIT (OUTPATIENT)
Dept: LAB | Facility: HOSPITAL | Age: 66
End: 2021-04-30
Payer: MEDICARE

## 2021-04-30 DIAGNOSIS — R16.0 LIVER MASS: ICD-10-CM

## 2021-04-30 DIAGNOSIS — K75.81 NASH (NONALCOHOLIC STEATOHEPATITIS): ICD-10-CM

## 2021-04-30 DIAGNOSIS — I25.10 CORONARY ARTERY DISEASE INVOLVING NATIVE CORONARY ARTERY OF NATIVE HEART WITHOUT ANGINA PECTORIS: Primary | ICD-10-CM

## 2021-04-30 DIAGNOSIS — C22.0 HCC (HEPATOCELLULAR CARCINOMA): ICD-10-CM

## 2021-04-30 DIAGNOSIS — K74.60 HEPATIC CIRRHOSIS, UNSPECIFIED HEPATIC CIRRHOSIS TYPE, UNSPECIFIED WHETHER ASCITES PRESENT: ICD-10-CM

## 2021-04-30 DIAGNOSIS — C24.9 MALIGNANT NEOPLASM OF BILIARY TRACT, UNSPECIFIED: ICD-10-CM

## 2021-04-30 DIAGNOSIS — Z01.818 PRE-TRANSPLANT EVALUATION FOR LIVER TRANSPLANT: ICD-10-CM

## 2021-04-30 LAB
AFP SERPL-MCNC: 2.6 NG/ML (ref 0–8.4)
ALBUMIN SERPL BCP-MCNC: 3.8 G/DL (ref 3.5–5.2)
ALP SERPL-CCNC: 67 U/L (ref 55–135)
ALT SERPL W/O P-5'-P-CCNC: 18 U/L (ref 10–44)
ANION GAP SERPL CALC-SCNC: 9 MMOL/L (ref 8–16)
AST SERPL-CCNC: 22 U/L (ref 10–40)
BASOPHILS # BLD AUTO: 0.05 K/UL (ref 0–0.2)
BASOPHILS NFR BLD: 0.9 % (ref 0–1.9)
BILIRUB SERPL-MCNC: 0.4 MG/DL (ref 0.1–1)
BUN SERPL-MCNC: 16 MG/DL (ref 8–23)
CALCIUM SERPL-MCNC: 9.7 MG/DL (ref 8.7–10.5)
CHLORIDE SERPL-SCNC: 107 MMOL/L (ref 95–110)
CO2 SERPL-SCNC: 25 MMOL/L (ref 23–29)
CREAT SERPL-MCNC: 0.8 MG/DL (ref 0.5–1.4)
DIFFERENTIAL METHOD: ABNORMAL
EOSINOPHIL # BLD AUTO: 0.1 K/UL (ref 0–0.5)
EOSINOPHIL NFR BLD: 2.3 % (ref 0–8)
ERYTHROCYTE [DISTWIDTH] IN BLOOD BY AUTOMATED COUNT: 13.5 % (ref 11.5–14.5)
EST. GFR  (AFRICAN AMERICAN): >60 ML/MIN/1.73 M^2
EST. GFR  (NON AFRICAN AMERICAN): >60 ML/MIN/1.73 M^2
GLUCOSE SERPL-MCNC: 146 MG/DL (ref 70–110)
HCT VFR BLD AUTO: 45.3 % (ref 40–54)
HGB BLD-MCNC: 14.8 G/DL (ref 14–18)
IMM GRANULOCYTES # BLD AUTO: 0.02 K/UL (ref 0–0.04)
IMM GRANULOCYTES NFR BLD AUTO: 0.4 % (ref 0–0.5)
INR PPP: 1 (ref 0.8–1.2)
LYMPHOCYTES # BLD AUTO: 1.2 K/UL (ref 1–4.8)
LYMPHOCYTES NFR BLD: 20.3 % (ref 18–48)
MCH RBC QN AUTO: 29.2 PG (ref 27–31)
MCHC RBC AUTO-ENTMCNC: 32.7 G/DL (ref 32–36)
MCV RBC AUTO: 89 FL (ref 82–98)
MONOCYTES # BLD AUTO: 0.6 K/UL (ref 0.3–1)
MONOCYTES NFR BLD: 10.9 % (ref 4–15)
NEUTROPHILS # BLD AUTO: 3.7 K/UL (ref 1.8–7.7)
NEUTROPHILS NFR BLD: 65.2 % (ref 38–73)
NRBC BLD-RTO: 0 /100 WBC
PLATELET # BLD AUTO: 119 K/UL (ref 150–450)
PMV BLD AUTO: 11.4 FL (ref 9.2–12.9)
POTASSIUM SERPL-SCNC: 4.3 MMOL/L (ref 3.5–5.1)
PROT SERPL-MCNC: 7.2 G/DL (ref 6–8.4)
PROTHROMBIN TIME: 11 SEC (ref 9–12.5)
RBC # BLD AUTO: 5.07 M/UL (ref 4.6–6.2)
SODIUM SERPL-SCNC: 141 MMOL/L (ref 136–145)
WBC # BLD AUTO: 5.67 K/UL (ref 3.9–12.7)

## 2021-04-30 PROCEDURE — 36415 COLL VENOUS BLD VENIPUNCTURE: CPT | Mod: TXP | Performed by: INTERNAL MEDICINE

## 2021-04-30 PROCEDURE — 85025 COMPLETE CBC W/AUTO DIFF WBC: CPT | Mod: TXP | Performed by: INTERNAL MEDICINE

## 2021-04-30 PROCEDURE — 82105 ALPHA-FETOPROTEIN SERUM: CPT | Mod: TXP | Performed by: INTERNAL MEDICINE

## 2021-04-30 PROCEDURE — 80053 COMPREHEN METABOLIC PANEL: CPT | Mod: TXP | Performed by: INTERNAL MEDICINE

## 2021-04-30 PROCEDURE — 80321 ALCOHOLS BIOMARKERS 1OR 2: CPT | Mod: TXP | Performed by: INTERNAL MEDICINE

## 2021-04-30 PROCEDURE — 85610 PROTHROMBIN TIME: CPT | Mod: TXP | Performed by: INTERNAL MEDICINE

## 2021-05-03 ENCOUNTER — HOSPITAL ENCOUNTER (OUTPATIENT)
Dept: RADIOLOGY | Facility: HOSPITAL | Age: 66
Discharge: HOME | End: 2021-05-03
Attending: INTERNAL MEDICINE
Payer: MEDICARE

## 2021-05-03 DIAGNOSIS — C24.9 MALIGNANT NEOPLASM OF BILIARY TRACT, UNSPECIFIED: ICD-10-CM

## 2021-05-03 PROCEDURE — 25500020 PHARM REV CODE 255: Mod: TXP | Performed by: INTERNAL MEDICINE

## 2021-05-03 PROCEDURE — 74160 CT ABDOMEN W/CONTRAST: CPT | Mod: TC,TXP

## 2021-05-03 PROCEDURE — 74160 CT ABDOMEN WITH CONTRAST: ICD-10-PCS | Mod: 26,TXP,, | Performed by: INTERNAL MEDICINE

## 2021-05-03 PROCEDURE — 74160 CT ABDOMEN W/CONTRAST: CPT | Mod: 26,TXP,, | Performed by: INTERNAL MEDICINE

## 2021-05-03 RX ADMIN — IOHEXOL 100 ML: 350 INJECTION, SOLUTION INTRAVENOUS at 11:05

## 2021-05-04 ENCOUNTER — TELEPHONE (OUTPATIENT)
Dept: TRANSPLANT | Facility: CLINIC | Age: 66
End: 2021-05-04

## 2021-05-04 DIAGNOSIS — R91.8 OTHER NONSPECIFIC ABNORMAL FINDING OF LUNG FIELD: ICD-10-CM

## 2021-05-04 DIAGNOSIS — Z01.818 PRE-TRANSPLANT EVALUATION FOR LIVER TRANSPLANT: Primary | ICD-10-CM

## 2021-05-04 DIAGNOSIS — C24.9 MALIGNANT NEOPLASM OF BILIARY TRACT, UNSPECIFIED: ICD-10-CM

## 2021-05-04 LAB — PHOSPHATIDYLETHANOL (PETH): NEGATIVE NG/ML

## 2021-05-05 ENCOUNTER — DOCUMENTATION ONLY (OUTPATIENT)
Dept: CARDIOLOGY | Facility: CLINIC | Age: 66
End: 2021-05-05

## 2021-05-05 ENCOUNTER — OFFICE VISIT (OUTPATIENT)
Dept: CARDIOLOGY | Facility: CLINIC | Age: 66
End: 2021-05-05
Payer: MEDICARE

## 2021-05-05 ENCOUNTER — PATIENT MESSAGE (OUTPATIENT)
Dept: TRANSPLANT | Facility: CLINIC | Age: 66
End: 2021-05-05

## 2021-05-05 ENCOUNTER — LAB VISIT (OUTPATIENT)
Dept: INTERNAL MEDICINE | Facility: CLINIC | Age: 66
End: 2021-05-05
Payer: MEDICARE

## 2021-05-05 VITALS
SYSTOLIC BLOOD PRESSURE: 157 MMHG | DIASTOLIC BLOOD PRESSURE: 77 MMHG | WEIGHT: 221.56 LBS | OXYGEN SATURATION: 95 % | HEIGHT: 72 IN | HEART RATE: 102 BPM | BODY MASS INDEX: 30.01 KG/M2

## 2021-05-05 DIAGNOSIS — K74.60 HEPATIC CIRRHOSIS, UNSPECIFIED HEPATIC CIRRHOSIS TYPE, UNSPECIFIED WHETHER ASCITES PRESENT: ICD-10-CM

## 2021-05-05 DIAGNOSIS — E66.3 OVERWEIGHT (BMI 25.0-29.9): ICD-10-CM

## 2021-05-05 DIAGNOSIS — Z01.818 PRE-TRANSPLANT EVALUATION FOR LIVER TRANSPLANT: ICD-10-CM

## 2021-05-05 DIAGNOSIS — Z01.818 PREOP TESTING: ICD-10-CM

## 2021-05-05 DIAGNOSIS — I35.0 AORTIC STENOSIS, MODERATE: ICD-10-CM

## 2021-05-05 DIAGNOSIS — I73.9 PAD (PERIPHERAL ARTERY DISEASE): ICD-10-CM

## 2021-05-05 DIAGNOSIS — K75.81 NASH (NONALCOHOLIC STEATOHEPATITIS): ICD-10-CM

## 2021-05-05 DIAGNOSIS — I10 ESSENTIAL HYPERTENSION: ICD-10-CM

## 2021-05-05 DIAGNOSIS — C22.0 HEPATOCELLULAR CARCINOMA: ICD-10-CM

## 2021-05-05 DIAGNOSIS — C22.0 HCC (HEPATOCELLULAR CARCINOMA): ICD-10-CM

## 2021-05-05 DIAGNOSIS — Z76.82 AWAITING LIVER TRANSPLANT: ICD-10-CM

## 2021-05-05 DIAGNOSIS — I25.10 CORONARY ARTERY DISEASE, ANGINA PRESENCE UNSPECIFIED, UNSPECIFIED VESSEL OR LESION TYPE, UNSPECIFIED WHETHER NATIVE OR TRANSPLANTED HEART: ICD-10-CM

## 2021-05-05 DIAGNOSIS — E11.9 TYPE 2 DIABETES MELLITUS WITHOUT COMPLICATION, UNSPECIFIED WHETHER LONG TERM INSULIN USE: ICD-10-CM

## 2021-05-05 DIAGNOSIS — E78.5 HYPERLIPIDEMIA, UNSPECIFIED HYPERLIPIDEMIA TYPE: ICD-10-CM

## 2021-05-05 DIAGNOSIS — I25.10 CORONARY ARTERY DISEASE INVOLVING NATIVE CORONARY ARTERY OF NATIVE HEART WITHOUT ANGINA PECTORIS: ICD-10-CM

## 2021-05-05 PROCEDURE — 1126F AMNT PAIN NOTED NONE PRSNT: CPT | Mod: S$GLB,TXP,, | Performed by: INTERNAL MEDICINE

## 2021-05-05 PROCEDURE — 99215 OFFICE O/P EST HI 40 MIN: CPT | Mod: S$GLB,TXP,, | Performed by: INTERNAL MEDICINE

## 2021-05-05 PROCEDURE — 3008F PR BODY MASS INDEX (BMI) DOCUMENTED: ICD-10-PCS | Mod: CPTII,S$GLB,TXP, | Performed by: INTERNAL MEDICINE

## 2021-05-05 PROCEDURE — 3078F DIAST BP <80 MM HG: CPT | Mod: CPTII,S$GLB,TXP, | Performed by: INTERNAL MEDICINE

## 2021-05-05 PROCEDURE — 3008F BODY MASS INDEX DOCD: CPT | Mod: CPTII,S$GLB,TXP, | Performed by: INTERNAL MEDICINE

## 2021-05-05 PROCEDURE — 3078F PR MOST RECENT DIASTOLIC BLOOD PRESSURE < 80 MM HG: ICD-10-PCS | Mod: CPTII,S$GLB,TXP, | Performed by: INTERNAL MEDICINE

## 2021-05-05 PROCEDURE — 3077F SYST BP >= 140 MM HG: CPT | Mod: CPTII,S$GLB,TXP, | Performed by: INTERNAL MEDICINE

## 2021-05-05 PROCEDURE — U0003 INFECTIOUS AGENT DETECTION BY NUCLEIC ACID (DNA OR RNA); SEVERE ACUTE RESPIRATORY SYNDROME CORONAVIRUS 2 (SARS-COV-2) (CORONAVIRUS DISEASE [COVID-19]), AMPLIFIED PROBE TECHNIQUE, MAKING USE OF HIGH THROUGHPUT TECHNOLOGIES AS DESCRIBED BY CMS-2020-01-R: HCPCS | Performed by: INTERNAL MEDICINE

## 2021-05-05 PROCEDURE — 3077F PR MOST RECENT SYSTOLIC BLOOD PRESSURE >= 140 MM HG: ICD-10-PCS | Mod: CPTII,S$GLB,TXP, | Performed by: INTERNAL MEDICINE

## 2021-05-05 PROCEDURE — 99999 PR PBB SHADOW E&M-EST. PATIENT-LVL V: ICD-10-PCS | Mod: PBBFAC,TXP,, | Performed by: INTERNAL MEDICINE

## 2021-05-05 PROCEDURE — 99999 PR PBB SHADOW E&M-EST. PATIENT-LVL V: CPT | Mod: PBBFAC,TXP,, | Performed by: INTERNAL MEDICINE

## 2021-05-05 PROCEDURE — 1159F MED LIST DOCD IN RCRD: CPT | Mod: S$GLB,TXP,, | Performed by: INTERNAL MEDICINE

## 2021-05-05 PROCEDURE — 1126F PR PAIN SEVERITY QUANTIFIED, NO PAIN PRESENT: ICD-10-PCS | Mod: S$GLB,TXP,, | Performed by: INTERNAL MEDICINE

## 2021-05-05 PROCEDURE — 3044F PR MOST RECENT HEMOGLOBIN A1C LEVEL <7.0%: ICD-10-PCS | Mod: CPTII,S$GLB,TXP, | Performed by: INTERNAL MEDICINE

## 2021-05-05 PROCEDURE — U0005 INFEC AGEN DETEC AMPLI PROBE: HCPCS | Performed by: INTERNAL MEDICINE

## 2021-05-05 PROCEDURE — 1159F PR MEDICATION LIST DOCUMENTED IN MEDICAL RECORD: ICD-10-PCS | Mod: S$GLB,TXP,, | Performed by: INTERNAL MEDICINE

## 2021-05-05 PROCEDURE — 3044F HG A1C LEVEL LT 7.0%: CPT | Mod: CPTII,S$GLB,TXP, | Performed by: INTERNAL MEDICINE

## 2021-05-05 PROCEDURE — 99215 PR OFFICE/OUTPT VISIT, EST, LEVL V, 40-54 MIN: ICD-10-PCS | Mod: S$GLB,TXP,, | Performed by: INTERNAL MEDICINE

## 2021-05-05 RX ORDER — DIPHENHYDRAMINE HCL 50 MG
50 CAPSULE ORAL ONCE
Status: CANCELLED | OUTPATIENT
Start: 2021-05-05 | End: 2021-05-05

## 2021-05-06 ENCOUNTER — TELEPHONE (OUTPATIENT)
Dept: TRANSPLANT | Facility: CLINIC | Age: 66
End: 2021-05-06

## 2021-05-06 PROBLEM — Z76.82 AWAITING LIVER TRANSPLANT: Status: ACTIVE | Noted: 2021-05-06

## 2021-05-06 LAB — SARS-COV-2 RNA RESP QL NAA+PROBE: NOT DETECTED

## 2021-05-07 ENCOUNTER — TELEPHONE (OUTPATIENT)
Dept: TRANSPLANT | Facility: CLINIC | Age: 66
End: 2021-05-07

## 2021-05-12 ENCOUNTER — TELEPHONE (OUTPATIENT)
Dept: ELECTROPHYSIOLOGY | Facility: CLINIC | Age: 66
End: 2021-05-12

## 2021-05-12 DIAGNOSIS — I48.11 LONGSTANDING PERSISTENT ATRIAL FIBRILLATION: Primary | ICD-10-CM

## 2021-05-14 ENCOUNTER — PATIENT MESSAGE (OUTPATIENT)
Dept: ELECTROPHYSIOLOGY | Facility: CLINIC | Age: 66
End: 2021-05-14

## 2021-05-14 ENCOUNTER — OFFICE VISIT (OUTPATIENT)
Dept: ELECTROPHYSIOLOGY | Facility: CLINIC | Age: 66
End: 2021-05-14
Payer: MEDICARE

## 2021-05-14 ENCOUNTER — HOSPITAL ENCOUNTER (OUTPATIENT)
Dept: CARDIOLOGY | Facility: CLINIC | Age: 66
Discharge: HOME | End: 2021-05-14
Payer: MEDICARE

## 2021-05-14 VITALS
HEIGHT: 72 IN | BODY MASS INDEX: 30.73 KG/M2 | WEIGHT: 226.88 LBS | DIASTOLIC BLOOD PRESSURE: 61 MMHG | HEART RATE: 112 BPM | SYSTOLIC BLOOD PRESSURE: 125 MMHG

## 2021-05-14 DIAGNOSIS — R00.0 TACHYCARDIA: ICD-10-CM

## 2021-05-14 DIAGNOSIS — E11.00 TYPE 2 DIABETES MELLITUS WITH HYPEROSMOLARITY WITHOUT COMA, UNSPECIFIED WHETHER LONG TERM INSULIN USE: Primary | ICD-10-CM

## 2021-05-14 DIAGNOSIS — I48.19 PERSISTENT ATRIAL FIBRILLATION: ICD-10-CM

## 2021-05-14 DIAGNOSIS — I48.19 PERSISTENT ATRIAL FIBRILLATION: Primary | ICD-10-CM

## 2021-05-14 DIAGNOSIS — E66.3 OVERWEIGHT (BMI 25.0-29.9): ICD-10-CM

## 2021-05-14 DIAGNOSIS — I25.10 CORONARY ARTERY DISEASE INVOLVING NATIVE CORONARY ARTERY OF NATIVE HEART WITHOUT ANGINA PECTORIS: ICD-10-CM

## 2021-05-14 DIAGNOSIS — I48.11 LONGSTANDING PERSISTENT ATRIAL FIBRILLATION: ICD-10-CM

## 2021-05-14 DIAGNOSIS — I10 ESSENTIAL HYPERTENSION: ICD-10-CM

## 2021-05-14 PROCEDURE — 3288F FALL RISK ASSESSMENT DOCD: CPT | Mod: CPTII,NTX,S$GLB, | Performed by: INTERNAL MEDICINE

## 2021-05-14 PROCEDURE — 99999 PR PBB SHADOW E&M-EST. PATIENT-LVL IV: ICD-10-PCS | Mod: PBBFAC,TXP,, | Performed by: INTERNAL MEDICINE

## 2021-05-14 PROCEDURE — 99205 PR OFFICE/OUTPT VISIT, NEW, LEVL V, 60-74 MIN: ICD-10-PCS | Mod: NTX,S$GLB,, | Performed by: INTERNAL MEDICINE

## 2021-05-14 PROCEDURE — 99205 OFFICE O/P NEW HI 60 MIN: CPT | Mod: NTX,S$GLB,, | Performed by: INTERNAL MEDICINE

## 2021-05-14 PROCEDURE — 3044F PR MOST RECENT HEMOGLOBIN A1C LEVEL <7.0%: ICD-10-PCS | Mod: CPTII,NTX,S$GLB, | Performed by: INTERNAL MEDICINE

## 2021-05-14 PROCEDURE — 99999 PR PBB SHADOW E&M-EST. PATIENT-LVL IV: CPT | Mod: PBBFAC,TXP,, | Performed by: INTERNAL MEDICINE

## 2021-05-14 PROCEDURE — 1159F MED LIST DOCD IN RCRD: CPT | Mod: NTX,S$GLB,, | Performed by: INTERNAL MEDICINE

## 2021-05-14 PROCEDURE — 93005 RHYTHM STRIP: ICD-10-PCS | Mod: NTX,S$GLB,, | Performed by: INTERNAL MEDICINE

## 2021-05-14 PROCEDURE — 93010 ELECTROCARDIOGRAM REPORT: CPT | Mod: NTX,S$GLB,, | Performed by: INTERNAL MEDICINE

## 2021-05-14 PROCEDURE — 1101F PR PT FALLS ASSESS DOC 0-1 FALLS W/OUT INJ PAST YR: ICD-10-PCS | Mod: CPTII,NTX,S$GLB, | Performed by: INTERNAL MEDICINE

## 2021-05-14 PROCEDURE — 1101F PT FALLS ASSESS-DOCD LE1/YR: CPT | Mod: CPTII,NTX,S$GLB, | Performed by: INTERNAL MEDICINE

## 2021-05-14 PROCEDURE — 3008F PR BODY MASS INDEX (BMI) DOCUMENTED: ICD-10-PCS | Mod: CPTII,NTX,S$GLB, | Performed by: INTERNAL MEDICINE

## 2021-05-14 PROCEDURE — 93005 ELECTROCARDIOGRAM TRACING: CPT | Mod: NTX,S$GLB,, | Performed by: INTERNAL MEDICINE

## 2021-05-14 PROCEDURE — 3008F BODY MASS INDEX DOCD: CPT | Mod: CPTII,NTX,S$GLB, | Performed by: INTERNAL MEDICINE

## 2021-05-14 PROCEDURE — 1159F PR MEDICATION LIST DOCUMENTED IN MEDICAL RECORD: ICD-10-PCS | Mod: NTX,S$GLB,, | Performed by: INTERNAL MEDICINE

## 2021-05-14 PROCEDURE — 3044F HG A1C LEVEL LT 7.0%: CPT | Mod: CPTII,NTX,S$GLB, | Performed by: INTERNAL MEDICINE

## 2021-05-14 PROCEDURE — 93010 RHYTHM STRIP: ICD-10-PCS | Mod: NTX,S$GLB,, | Performed by: INTERNAL MEDICINE

## 2021-05-14 PROCEDURE — 3288F PR FALLS RISK ASSESSMENT DOCUMENTED: ICD-10-PCS | Mod: CPTII,NTX,S$GLB, | Performed by: INTERNAL MEDICINE

## 2021-05-14 RX ORDER — METOPROLOL SUCCINATE 25 MG/1
50 TABLET, EXTENDED RELEASE ORAL DAILY
Qty: 30 TABLET | Refills: 3 | Status: SHIPPED | OUTPATIENT
Start: 2021-05-14 | End: 2021-06-18 | Stop reason: SDUPTHER

## 2021-05-17 ENCOUNTER — LAB VISIT (OUTPATIENT)
Dept: INTERNAL MEDICINE | Facility: CLINIC | Age: 66
End: 2021-05-17
Payer: MEDICARE

## 2021-05-17 DIAGNOSIS — Z01.818 PRE-OP TESTING: ICD-10-CM

## 2021-05-17 PROCEDURE — U0003 INFECTIOUS AGENT DETECTION BY NUCLEIC ACID (DNA OR RNA); SEVERE ACUTE RESPIRATORY SYNDROME CORONAVIRUS 2 (SARS-COV-2) (CORONAVIRUS DISEASE [COVID-19]), AMPLIFIED PROBE TECHNIQUE, MAKING USE OF HIGH THROUGHPUT TECHNOLOGIES AS DESCRIBED BY CMS-2020-01-R: HCPCS | Performed by: INTERNAL MEDICINE

## 2021-05-17 PROCEDURE — U0005 INFEC AGEN DETEC AMPLI PROBE: HCPCS | Performed by: INTERNAL MEDICINE

## 2021-05-18 ENCOUNTER — TELEPHONE (OUTPATIENT)
Dept: CARDIOLOGY | Facility: HOSPITAL | Age: 66
End: 2021-05-18

## 2021-05-18 ENCOUNTER — PATIENT MESSAGE (OUTPATIENT)
Dept: CARDIOLOGY | Facility: HOSPITAL | Age: 66
End: 2021-05-18

## 2021-05-18 LAB — SARS-COV-2 RNA RESP QL NAA+PROBE: NOT DETECTED

## 2021-05-19 ENCOUNTER — ANESTHESIA (OUTPATIENT)
Dept: MEDSURG UNIT | Facility: HOSPITAL | Age: 66
End: 2021-05-19
Payer: MEDICARE

## 2021-05-19 ENCOUNTER — CLINICAL SUPPORT (OUTPATIENT)
Dept: CARDIOLOGY | Facility: HOSPITAL | Age: 66
End: 2021-05-19
Attending: INTERNAL MEDICINE
Payer: MEDICARE

## 2021-05-19 ENCOUNTER — ANESTHESIA EVENT (OUTPATIENT)
Dept: MEDSURG UNIT | Facility: HOSPITAL | Age: 66
End: 2021-05-19
Payer: MEDICARE

## 2021-05-19 ENCOUNTER — HOSPITAL ENCOUNTER (OUTPATIENT)
Dept: CARDIOLOGY | Facility: HOSPITAL | Age: 66
Discharge: HOME | End: 2021-05-19
Attending: INTERNAL MEDICINE | Admitting: INTERNAL MEDICINE
Payer: MEDICARE

## 2021-05-19 ENCOUNTER — HOSPITAL ENCOUNTER (OUTPATIENT)
Facility: HOSPITAL | Age: 66
Discharge: HOME | End: 2021-05-19
Attending: INTERNAL MEDICINE | Admitting: INTERNAL MEDICINE
Payer: MEDICARE

## 2021-05-19 VITALS
DIASTOLIC BLOOD PRESSURE: 68 MMHG | SYSTOLIC BLOOD PRESSURE: 108 MMHG | RESPIRATION RATE: 12 BRPM | OXYGEN SATURATION: 94 % | BODY MASS INDEX: 29.8 KG/M2 | HEART RATE: 81 BPM | HEIGHT: 72 IN | WEIGHT: 220 LBS | TEMPERATURE: 97 F

## 2021-05-19 VITALS
HEIGHT: 72 IN | WEIGHT: 220 LBS | SYSTOLIC BLOOD PRESSURE: 128 MMHG | DIASTOLIC BLOOD PRESSURE: 74 MMHG | BODY MASS INDEX: 29.8 KG/M2

## 2021-05-19 DIAGNOSIS — I48.19 PERSISTENT ATRIAL FIBRILLATION: ICD-10-CM

## 2021-05-19 DIAGNOSIS — I48.91 ATRIAL FIBRILLATION, UNSPECIFIED TYPE: ICD-10-CM

## 2021-05-19 DIAGNOSIS — I48.91 ATRIAL FIBRILLATION: ICD-10-CM

## 2021-05-19 DIAGNOSIS — I48.91 AF (ATRIAL FIBRILLATION): ICD-10-CM

## 2021-05-19 LAB
BSA FOR ECHO PROCEDURE: 2.25 M2
EJECTION FRACTION: 53 %
POCT GLUCOSE: 160 MG/DL (ref 70–110)

## 2021-05-19 PROCEDURE — 93312 TRANSESOPHAGEAL ECHO (TEE) (CUPID ONLY): ICD-10-PCS | Mod: 26,NTX,, | Performed by: INTERNAL MEDICINE

## 2021-05-19 PROCEDURE — D9220A PRA ANESTHESIA: Mod: ANES,NTX,, | Performed by: ANESTHESIOLOGY

## 2021-05-19 PROCEDURE — 93005 ELECTROCARDIOGRAM TRACING: CPT | Mod: TXP

## 2021-05-19 PROCEDURE — 93272 ECG/REVIEW INTERPRET ONLY: CPT | Mod: NTX,,, | Performed by: INTERNAL MEDICINE

## 2021-05-19 PROCEDURE — 93312 ECHO TRANSESOPHAGEAL: CPT | Mod: 26,NTX,, | Performed by: INTERNAL MEDICINE

## 2021-05-19 PROCEDURE — 92960 PR CARDIOVERSION, ELECTIVE;EXTERN: ICD-10-PCS | Mod: NTX,,, | Performed by: INTERNAL MEDICINE

## 2021-05-19 PROCEDURE — D9220A PRA ANESTHESIA: Mod: CRNA,NTX,, | Performed by: STUDENT IN AN ORGANIZED HEALTH CARE EDUCATION/TRAINING PROGRAM

## 2021-05-19 PROCEDURE — 93325 TRANSESOPHAGEAL ECHO (TEE) (CUPID ONLY): ICD-10-PCS | Mod: 26,NTX,, | Performed by: INTERNAL MEDICINE

## 2021-05-19 PROCEDURE — 25000003 PHARM REV CODE 250: Mod: TXP | Performed by: INTERNAL MEDICINE

## 2021-05-19 PROCEDURE — 63600175 PHARM REV CODE 636 W HCPCS: Mod: NTX | Performed by: STUDENT IN AN ORGANIZED HEALTH CARE EDUCATION/TRAINING PROGRAM

## 2021-05-19 PROCEDURE — 93010 EKG 12-LEAD: ICD-10-PCS | Mod: NTX,,, | Performed by: INTERNAL MEDICINE

## 2021-05-19 PROCEDURE — 93010 ELECTROCARDIOGRAM REPORT: CPT | Mod: 76,NTX,, | Performed by: INTERNAL MEDICINE

## 2021-05-19 PROCEDURE — 25000003 PHARM REV CODE 250: Mod: TXP | Performed by: STUDENT IN AN ORGANIZED HEALTH CARE EDUCATION/TRAINING PROGRAM

## 2021-05-19 PROCEDURE — 25000003 PHARM REV CODE 250: Mod: NTX | Performed by: INTERNAL MEDICINE

## 2021-05-19 PROCEDURE — 37000009 HC ANESTHESIA EA ADD 15 MINS: Mod: TXP | Performed by: INTERNAL MEDICINE

## 2021-05-19 PROCEDURE — 93010 EKG 12-LEAD: ICD-10-PCS | Mod: 76,NTX,, | Performed by: INTERNAL MEDICINE

## 2021-05-19 PROCEDURE — 93010 ELECTROCARDIOGRAM REPORT: CPT | Mod: NTX,,, | Performed by: INTERNAL MEDICINE

## 2021-05-19 PROCEDURE — 92960 CARDIOVERSION ELECTRIC EXT: CPT | Mod: NTX,,, | Performed by: INTERNAL MEDICINE

## 2021-05-19 PROCEDURE — D9220A PRA ANESTHESIA: ICD-10-PCS | Mod: ANES,NTX,, | Performed by: ANESTHESIOLOGY

## 2021-05-19 PROCEDURE — 93271 ECG/MONITORING AND ANALYSIS: CPT | Mod: NTX

## 2021-05-19 PROCEDURE — 93320 DOPPLER ECHO COMPLETE: CPT | Mod: 26,NTX,, | Performed by: INTERNAL MEDICINE

## 2021-05-19 PROCEDURE — 93325 DOPPLER ECHO COLOR FLOW MAPG: CPT | Mod: 26,NTX,, | Performed by: INTERNAL MEDICINE

## 2021-05-19 PROCEDURE — 93312 ECHO TRANSESOPHAGEAL: CPT | Mod: TXP

## 2021-05-19 PROCEDURE — 93320 TRANSESOPHAGEAL ECHO (TEE) (CUPID ONLY): ICD-10-PCS | Mod: 26,NTX,, | Performed by: INTERNAL MEDICINE

## 2021-05-19 PROCEDURE — 93272 CARDIAC EVENT MONITOR (CUPID ONLY): ICD-10-PCS | Mod: NTX,,, | Performed by: INTERNAL MEDICINE

## 2021-05-19 PROCEDURE — 82962 GLUCOSE BLOOD TEST: CPT | Mod: NTX | Performed by: INTERNAL MEDICINE

## 2021-05-19 PROCEDURE — D9220A PRA ANESTHESIA: ICD-10-PCS | Mod: CRNA,NTX,, | Performed by: STUDENT IN AN ORGANIZED HEALTH CARE EDUCATION/TRAINING PROGRAM

## 2021-05-19 PROCEDURE — 92960 CARDIOVERSION ELECTRIC EXT: CPT | Mod: TXP | Performed by: INTERNAL MEDICINE

## 2021-05-19 PROCEDURE — 37000008 HC ANESTHESIA 1ST 15 MINUTES: Mod: NTX | Performed by: INTERNAL MEDICINE

## 2021-05-19 RX ORDER — SILVER SULFADIAZINE 10 G/1000G
CREAM TOPICAL
Status: DISCONTINUED | OUTPATIENT
Start: 2021-05-19 | End: 2021-05-19 | Stop reason: HOSPADM

## 2021-05-19 RX ORDER — VASOPRESSIN 20 [USP'U]/ML
INJECTION, SOLUTION INTRAMUSCULAR; SUBCUTANEOUS
Status: DISCONTINUED | OUTPATIENT
Start: 2021-05-19 | End: 2021-05-19

## 2021-05-19 RX ORDER — PROPOFOL 10 MG/ML
VIAL (ML) INTRAVENOUS
Status: DISCONTINUED | OUTPATIENT
Start: 2021-05-19 | End: 2021-05-19

## 2021-05-19 RX ORDER — SODIUM CHLORIDE 0.9 % (FLUSH) 0.9 %
3 SYRINGE (ML) INJECTION
Status: DISCONTINUED | OUTPATIENT
Start: 2021-05-19 | End: 2021-05-19 | Stop reason: HOSPADM

## 2021-05-19 RX ORDER — LIDOCAINE HYDROCHLORIDE 20 MG/ML
SOLUTION OROPHARYNGEAL
Status: DISCONTINUED | OUTPATIENT
Start: 2021-05-19 | End: 2021-05-19

## 2021-05-19 RX ORDER — PROPOFOL 10 MG/ML
VIAL (ML) INTRAVENOUS CONTINUOUS PRN
Status: DISCONTINUED | OUTPATIENT
Start: 2021-05-19 | End: 2021-05-19

## 2021-05-19 RX ORDER — FLECAINIDE ACETATE 100 MG/1
100 TABLET ORAL EVERY 12 HOURS
Qty: 180 TABLET | Refills: 11 | Status: ON HOLD | OUTPATIENT
Start: 2021-05-19 | End: 2021-05-31 | Stop reason: SDUPTHER

## 2021-05-19 RX ORDER — LIDOCAINE HYDROCHLORIDE 20 MG/ML
INJECTION, SOLUTION EPIDURAL; INFILTRATION; INTRACAUDAL; PERINEURAL
Status: DISCONTINUED | OUTPATIENT
Start: 2021-05-19 | End: 2021-05-19

## 2021-05-19 RX ORDER — PHENYLEPHRINE HCL IN 0.9% NACL 1 MG/10 ML
SYRINGE (ML) INTRAVENOUS
Status: DISCONTINUED | OUTPATIENT
Start: 2021-05-19 | End: 2021-05-19

## 2021-05-19 RX ORDER — SODIUM CHLORIDE 9 MG/ML
INJECTION, SOLUTION INTRAVENOUS CONTINUOUS PRN
Status: DISCONTINUED | OUTPATIENT
Start: 2021-05-19 | End: 2021-05-19

## 2021-05-19 RX ADMIN — VASOPRESSIN 1 UNITS: 20 INJECTION INTRAVENOUS at 12:05

## 2021-05-19 RX ADMIN — VASOPRESSIN 2 UNITS: 20 INJECTION INTRAVENOUS at 12:05

## 2021-05-19 RX ADMIN — SODIUM CHLORIDE: 9 INJECTION, SOLUTION INTRAVENOUS at 11:05

## 2021-05-19 RX ADMIN — LIDOCAINE HYDROCHLORIDE 80 MG: 20 INJECTION, SOLUTION EPIDURAL; INFILTRATION; INTRACAUDAL; PERINEURAL at 11:05

## 2021-05-19 RX ADMIN — Medication 200 MCG: at 12:05

## 2021-05-19 RX ADMIN — PROPOFOL 80 MG: 10 INJECTION, EMULSION INTRAVENOUS at 11:05

## 2021-05-19 RX ADMIN — Medication 100 MCG: at 12:05

## 2021-05-19 RX ADMIN — PROPOFOL 150 MCG/KG/MIN: 10 INJECTION, EMULSION INTRAVENOUS at 11:05

## 2021-05-19 RX ADMIN — LIDOCAINE HYDROCHLORIDE 15 ML: 20 SOLUTION ORAL; TOPICAL at 11:05

## 2021-05-20 LAB — POCT GLUCOSE: 158 MG/DL (ref 70–110)

## 2021-05-21 ENCOUNTER — TELEPHONE (OUTPATIENT)
Dept: TRANSPLANT | Facility: CLINIC | Age: 66
End: 2021-05-21

## 2021-05-21 ENCOUNTER — HOSPITAL ENCOUNTER (INPATIENT)
Facility: HOSPITAL | Age: 66
LOS: 3 days | Discharge: HOME | DRG: 291 | End: 2021-05-24
Attending: INTERNAL MEDICINE | Admitting: INTERNAL MEDICINE
Payer: MEDICARE

## 2021-05-21 DIAGNOSIS — I48.91 ATRIAL FIBRILLATION: ICD-10-CM

## 2021-05-21 DIAGNOSIS — K76.81 HEPATOPULMONARY SHUNTING: ICD-10-CM

## 2021-05-21 DIAGNOSIS — J96.00 ACUTE RESPIRATORY FAILURE: ICD-10-CM

## 2021-05-21 DIAGNOSIS — I50.9 HEART FAILURE: ICD-10-CM

## 2021-05-21 PROBLEM — I50.33 ACUTE ON CHRONIC DIASTOLIC HEART FAILURE: Status: ACTIVE | Noted: 2021-05-21

## 2021-05-21 PROBLEM — J96.01 ACUTE HYPOXEMIC RESPIRATORY FAILURE: Status: ACTIVE | Noted: 2021-05-21

## 2021-05-21 LAB
ANION GAP SERPL CALC-SCNC: 7 MMOL/L (ref 8–16)
BNP SERPL-MCNC: 178 PG/ML (ref 0–99)
BUN SERPL-MCNC: 26 MG/DL (ref 8–23)
CALCIUM SERPL-MCNC: 9.8 MG/DL (ref 8.7–10.5)
CHLORIDE SERPL-SCNC: 103 MMOL/L (ref 95–110)
CO2 SERPL-SCNC: 27 MMOL/L (ref 23–29)
CREAT SERPL-MCNC: 0.8 MG/DL (ref 0.5–1.4)
EST. GFR  (AFRICAN AMERICAN): >60 ML/MIN/1.73 M^2
EST. GFR  (NON AFRICAN AMERICAN): >60 ML/MIN/1.73 M^2
GLUCOSE SERPL-MCNC: 196 MG/DL (ref 70–110)
POCT GLUCOSE: 211 MG/DL (ref 70–110)
POTASSIUM SERPL-SCNC: 4.1 MMOL/L (ref 3.5–5.1)
SODIUM SERPL-SCNC: 137 MMOL/L (ref 136–145)

## 2021-05-21 PROCEDURE — 99223 1ST HOSP IP/OBS HIGH 75: CPT | Mod: AI,NTX,, | Performed by: HOSPITALIST

## 2021-05-21 PROCEDURE — 93005 ELECTROCARDIOGRAM TRACING: CPT | Mod: NTX

## 2021-05-21 PROCEDURE — 99223 PR INITIAL HOSPITAL CARE,LEVL III: ICD-10-PCS | Mod: AI,NTX,, | Performed by: HOSPITALIST

## 2021-05-21 PROCEDURE — 83880 ASSAY OF NATRIURETIC PEPTIDE: CPT | Mod: NTX | Performed by: HOSPITALIST

## 2021-05-21 PROCEDURE — 36415 COLL VENOUS BLD VENIPUNCTURE: CPT | Mod: NTX | Performed by: HOSPITALIST

## 2021-05-21 PROCEDURE — 93010 EKG 12-LEAD: ICD-10-PCS | Mod: NTX,,, | Performed by: INTERNAL MEDICINE

## 2021-05-21 PROCEDURE — 25000003 PHARM REV CODE 250: Mod: NTX | Performed by: HOSPITALIST

## 2021-05-21 PROCEDURE — 20600001 HC STEP DOWN PRIVATE ROOM: Mod: NTX

## 2021-05-21 PROCEDURE — 80048 BASIC METABOLIC PNL TOTAL CA: CPT | Mod: NTX | Performed by: HOSPITALIST

## 2021-05-21 PROCEDURE — 93010 ELECTROCARDIOGRAM REPORT: CPT | Mod: NTX,,, | Performed by: INTERNAL MEDICINE

## 2021-05-21 RX ORDER — LOVASTATIN 10 MG/1
20 TABLET ORAL NIGHTLY
Status: DISCONTINUED | OUTPATIENT
Start: 2021-05-21 | End: 2021-05-24 | Stop reason: HOSPADM

## 2021-05-21 RX ORDER — MUPIROCIN 20 MG/G
OINTMENT TOPICAL 2 TIMES DAILY
Status: DISCONTINUED | OUTPATIENT
Start: 2021-05-22 | End: 2021-05-24 | Stop reason: HOSPADM

## 2021-05-21 RX ORDER — SODIUM CHLORIDE 0.9 % (FLUSH) 0.9 %
10 SYRINGE (ML) INJECTION
Status: DISCONTINUED | OUTPATIENT
Start: 2021-05-21 | End: 2021-05-24 | Stop reason: HOSPADM

## 2021-05-21 RX ORDER — METOPROLOL SUCCINATE 50 MG/1
50 TABLET, EXTENDED RELEASE ORAL DAILY
Status: DISCONTINUED | OUTPATIENT
Start: 2021-05-22 | End: 2021-05-24 | Stop reason: HOSPADM

## 2021-05-21 RX ORDER — PANTOPRAZOLE SODIUM 20 MG/1
20 TABLET, DELAYED RELEASE ORAL DAILY
Status: DISCONTINUED | OUTPATIENT
Start: 2021-05-22 | End: 2021-05-24 | Stop reason: HOSPADM

## 2021-05-21 RX ORDER — GLUCAGON 1 MG
1 KIT INJECTION
Status: DISCONTINUED | OUTPATIENT
Start: 2021-05-21 | End: 2021-05-24 | Stop reason: HOSPADM

## 2021-05-21 RX ORDER — INSULIN ASPART 100 [IU]/ML
1-10 INJECTION, SOLUTION INTRAVENOUS; SUBCUTANEOUS
Status: DISCONTINUED | OUTPATIENT
Start: 2021-05-21 | End: 2021-05-24 | Stop reason: HOSPADM

## 2021-05-21 RX ORDER — GABAPENTIN 300 MG/1
300 CAPSULE ORAL 3 TIMES DAILY PRN
Status: DISCONTINUED | OUTPATIENT
Start: 2021-05-21 | End: 2021-05-24 | Stop reason: HOSPADM

## 2021-05-21 RX ORDER — IBUPROFEN 200 MG
16 TABLET ORAL
Status: DISCONTINUED | OUTPATIENT
Start: 2021-05-21 | End: 2021-05-24 | Stop reason: HOSPADM

## 2021-05-21 RX ORDER — FUROSEMIDE 10 MG/ML
20 INJECTION INTRAMUSCULAR; INTRAVENOUS 2 TIMES DAILY
Status: DISCONTINUED | OUTPATIENT
Start: 2021-05-21 | End: 2021-05-23

## 2021-05-21 RX ORDER — FLECAINIDE ACETATE 100 MG/1
100 TABLET ORAL EVERY 12 HOURS
Status: DISCONTINUED | OUTPATIENT
Start: 2021-05-21 | End: 2021-05-24 | Stop reason: HOSPADM

## 2021-05-21 RX ORDER — IPRATROPIUM BROMIDE AND ALBUTEROL SULFATE 2.5; .5 MG/3ML; MG/3ML
3 SOLUTION RESPIRATORY (INHALATION) EVERY 6 HOURS PRN
Status: DISCONTINUED | OUTPATIENT
Start: 2021-05-21 | End: 2021-05-24 | Stop reason: HOSPADM

## 2021-05-21 RX ORDER — TALC
6 POWDER (GRAM) TOPICAL NIGHTLY PRN
Status: DISCONTINUED | OUTPATIENT
Start: 2021-05-21 | End: 2021-05-24 | Stop reason: HOSPADM

## 2021-05-21 RX ORDER — IBUPROFEN 200 MG
24 TABLET ORAL
Status: DISCONTINUED | OUTPATIENT
Start: 2021-05-21 | End: 2021-05-24 | Stop reason: HOSPADM

## 2021-05-21 RX ORDER — LISINOPRIL 20 MG/1
20 TABLET ORAL DAILY
Status: DISCONTINUED | OUTPATIENT
Start: 2021-05-22 | End: 2021-05-24 | Stop reason: HOSPADM

## 2021-05-21 RX ORDER — LACTULOSE 10 G/15ML
30 SOLUTION ORAL 3 TIMES DAILY
Status: DISCONTINUED | OUTPATIENT
Start: 2021-05-22 | End: 2021-05-21

## 2021-05-21 RX ORDER — ACETAMINOPHEN 325 MG/1
325 TABLET ORAL EVERY 4 HOURS PRN
Status: DISCONTINUED | OUTPATIENT
Start: 2021-05-21 | End: 2021-05-24 | Stop reason: HOSPADM

## 2021-05-21 RX ORDER — AMLODIPINE BESYLATE 5 MG/1
5 TABLET ORAL DAILY
Status: DISCONTINUED | OUTPATIENT
Start: 2021-05-22 | End: 2021-05-24 | Stop reason: HOSPADM

## 2021-05-21 RX ORDER — ONDANSETRON 2 MG/ML
4 INJECTION INTRAMUSCULAR; INTRAVENOUS EVERY 8 HOURS PRN
Status: DISCONTINUED | OUTPATIENT
Start: 2021-05-21 | End: 2021-05-24 | Stop reason: HOSPADM

## 2021-05-21 RX ORDER — TRAZODONE HYDROCHLORIDE 50 MG/1
50 TABLET ORAL NIGHTLY
Status: DISCONTINUED | OUTPATIENT
Start: 2021-05-21 | End: 2021-05-21

## 2021-05-21 RX ORDER — ASPIRIN 81 MG/1
81 TABLET ORAL DAILY
Status: DISCONTINUED | OUTPATIENT
Start: 2021-05-22 | End: 2021-05-24 | Stop reason: HOSPADM

## 2021-05-21 RX ORDER — LACTULOSE 10 G/15ML
20 SOLUTION ORAL 2 TIMES DAILY
Status: DISCONTINUED | OUTPATIENT
Start: 2021-05-21 | End: 2021-05-24 | Stop reason: HOSPADM

## 2021-05-21 RX ORDER — SERTRALINE HYDROCHLORIDE 25 MG/1
25 TABLET, FILM COATED ORAL DAILY
Status: DISCONTINUED | OUTPATIENT
Start: 2021-05-22 | End: 2021-05-24 | Stop reason: HOSPADM

## 2021-05-21 RX ADMIN — LACTULOSE 20 G: 10 SOLUTION ORAL at 11:05

## 2021-05-21 RX ADMIN — FLECAINIDE ACETATE 100 MG: 100 TABLET ORAL at 10:05

## 2021-05-21 RX ADMIN — MELATONIN TAB 3 MG 6 MG: 3 TAB at 10:05

## 2021-05-21 RX ADMIN — LOVASTATIN 20 MG: 10 TABLET ORAL at 10:05

## 2021-05-21 RX ADMIN — GABAPENTIN 300 MG: 300 CAPSULE ORAL at 10:05

## 2021-05-21 RX ADMIN — APIXABAN 5 MG: 5 TABLET, FILM COATED ORAL at 11:05

## 2021-05-21 RX ADMIN — RIFAXIMIN 550 MG: 550 TABLET ORAL at 10:05

## 2021-05-22 LAB
ALBUMIN SERPL BCP-MCNC: 3.2 G/DL (ref 3.5–5.2)
ALLENS TEST: ABNORMAL
ALP SERPL-CCNC: 57 U/L (ref 55–135)
ALT SERPL W/O P-5'-P-CCNC: 13 U/L (ref 10–44)
AMMONIA PLAS-SCNC: 45 UMOL/L (ref 10–50)
ANION GAP SERPL CALC-SCNC: 8 MMOL/L (ref 8–16)
AST SERPL-CCNC: 19 U/L (ref 10–40)
BASOPHILS # BLD AUTO: 0.04 K/UL (ref 0–0.2)
BASOPHILS NFR BLD: 0.6 % (ref 0–1.9)
BILIRUB SERPL-MCNC: 0.4 MG/DL (ref 0.1–1)
BUN SERPL-MCNC: 27 MG/DL (ref 8–23)
CALCIUM SERPL-MCNC: 9.4 MG/DL (ref 8.7–10.5)
CHLORIDE SERPL-SCNC: 105 MMOL/L (ref 95–110)
CO2 SERPL-SCNC: 27 MMOL/L (ref 23–29)
CREAT SERPL-MCNC: 0.8 MG/DL (ref 0.5–1.4)
DELSYS: ABNORMAL
DIFFERENTIAL METHOD: ABNORMAL
EOSINOPHIL # BLD AUTO: 0.1 K/UL (ref 0–0.5)
EOSINOPHIL NFR BLD: 1.6 % (ref 0–8)
ERYTHROCYTE [DISTWIDTH] IN BLOOD BY AUTOMATED COUNT: 13.7 % (ref 11.5–14.5)
EST. GFR  (AFRICAN AMERICAN): >60 ML/MIN/1.73 M^2
EST. GFR  (NON AFRICAN AMERICAN): >60 ML/MIN/1.73 M^2
FIO2: 21
GLUCOSE SERPL-MCNC: 141 MG/DL (ref 70–110)
HCO3 UR-SCNC: 28.5 MMOL/L (ref 24–28)
HCT VFR BLD AUTO: 39.1 % (ref 40–54)
HGB BLD-MCNC: 12.4 G/DL (ref 14–18)
IMM GRANULOCYTES # BLD AUTO: 0.02 K/UL (ref 0–0.04)
IMM GRANULOCYTES NFR BLD AUTO: 0.3 % (ref 0–0.5)
INR PPP: 1.1 (ref 0.8–1.2)
LYMPHOCYTES # BLD AUTO: 1.3 K/UL (ref 1–4.8)
LYMPHOCYTES NFR BLD: 20.3 % (ref 18–48)
MCH RBC QN AUTO: 28.1 PG (ref 27–31)
MCHC RBC AUTO-ENTMCNC: 31.7 G/DL (ref 32–36)
MCV RBC AUTO: 89 FL (ref 82–98)
MODE: ABNORMAL
MONOCYTES # BLD AUTO: 0.8 K/UL (ref 0.3–1)
MONOCYTES NFR BLD: 11.9 % (ref 4–15)
NEUTROPHILS # BLD AUTO: 4.2 K/UL (ref 1.8–7.7)
NEUTROPHILS NFR BLD: 65.3 % (ref 38–73)
NRBC BLD-RTO: 0 /100 WBC
PCO2 BLDA: 44.5 MMHG (ref 35–45)
PH SMN: 7.42 [PH] (ref 7.35–7.45)
PLATELET # BLD AUTO: 96 K/UL (ref 150–450)
PMV BLD AUTO: 12 FL (ref 9.2–12.9)
PO2 BLDA: 72 MMHG (ref 80–100)
POC BE: 4 MMOL/L
POC SATURATED O2: 94 % (ref 95–100)
POC TCO2: 30 MMOL/L (ref 23–27)
POCT GLUCOSE: 124 MG/DL (ref 70–110)
POCT GLUCOSE: 188 MG/DL (ref 70–110)
POCT GLUCOSE: 215 MG/DL (ref 70–110)
POCT GLUCOSE: 227 MG/DL (ref 70–110)
POTASSIUM SERPL-SCNC: 3.8 MMOL/L (ref 3.5–5.1)
PROT SERPL-MCNC: 6.3 G/DL (ref 6–8.4)
PROTHROMBIN TIME: 11.9 SEC (ref 9–12.5)
RBC # BLD AUTO: 4.41 M/UL (ref 4.6–6.2)
SAMPLE: ABNORMAL
SITE: ABNORMAL
SODIUM SERPL-SCNC: 140 MMOL/L (ref 136–145)
SP02: 92
WBC # BLD AUTO: 6.36 K/UL (ref 3.9–12.7)

## 2021-05-22 PROCEDURE — 99900035 HC TECH TIME PER 15 MIN (STAT): Mod: NTX

## 2021-05-22 PROCEDURE — 99223 1ST HOSP IP/OBS HIGH 75: CPT | Mod: NTX,,, | Performed by: INTERNAL MEDICINE

## 2021-05-22 PROCEDURE — 27000221 HC OXYGEN, UP TO 24 HOURS: Mod: NTX

## 2021-05-22 PROCEDURE — 80053 COMPREHEN METABOLIC PANEL: CPT | Mod: NTX | Performed by: HOSPITALIST

## 2021-05-22 PROCEDURE — 99223 PR INITIAL HOSPITAL CARE,LEVL III: ICD-10-PCS | Mod: NTX,,, | Performed by: INTERNAL MEDICINE

## 2021-05-22 PROCEDURE — 99233 PR SUBSEQUENT HOSPITAL CARE,LEVL III: ICD-10-PCS | Mod: NTX,,, | Performed by: HOSPITALIST

## 2021-05-22 PROCEDURE — 99233 SBSQ HOSP IP/OBS HIGH 50: CPT | Mod: NTX,,, | Performed by: HOSPITALIST

## 2021-05-22 PROCEDURE — 25000003 PHARM REV CODE 250: Mod: NTX | Performed by: HOSPITALIST

## 2021-05-22 PROCEDURE — 82140 ASSAY OF AMMONIA: CPT | Mod: NTX | Performed by: HOSPITALIST

## 2021-05-22 PROCEDURE — 85610 PROTHROMBIN TIME: CPT | Mod: NTX | Performed by: HOSPITALIST

## 2021-05-22 PROCEDURE — 36415 COLL VENOUS BLD VENIPUNCTURE: CPT | Mod: NTX | Performed by: HOSPITALIST

## 2021-05-22 PROCEDURE — 20600001 HC STEP DOWN PRIVATE ROOM: Mod: NTX

## 2021-05-22 PROCEDURE — C9399 UNCLASSIFIED DRUGS OR BIOLOG: HCPCS | Mod: NTX | Performed by: HOSPITALIST

## 2021-05-22 PROCEDURE — 85025 COMPLETE CBC W/AUTO DIFF WBC: CPT | Mod: NTX | Performed by: HOSPITALIST

## 2021-05-22 PROCEDURE — 36600 WITHDRAWAL OF ARTERIAL BLOOD: CPT | Mod: NTX

## 2021-05-22 PROCEDURE — 63600175 PHARM REV CODE 636 W HCPCS: Mod: NTX | Performed by: HOSPITALIST

## 2021-05-22 RX ADMIN — LACTULOSE 20 G: 10 SOLUTION ORAL at 09:05

## 2021-05-22 RX ADMIN — LOVASTATIN 20 MG: 10 TABLET ORAL at 09:05

## 2021-05-22 RX ADMIN — PANTOPRAZOLE SODIUM 20 MG: 20 TABLET, DELAYED RELEASE ORAL at 09:05

## 2021-05-22 RX ADMIN — SERTRALINE HYDROCHLORIDE 25 MG: 25 TABLET ORAL at 09:05

## 2021-05-22 RX ADMIN — MUPIROCIN: 20 OINTMENT TOPICAL at 09:05

## 2021-05-22 RX ADMIN — MELATONIN TAB 3 MG 6 MG: 3 TAB at 09:05

## 2021-05-22 RX ADMIN — INSULIN ASPART 4 UNITS: 100 INJECTION, SOLUTION INTRAVENOUS; SUBCUTANEOUS at 06:05

## 2021-05-22 RX ADMIN — APIXABAN 5 MG: 5 TABLET, FILM COATED ORAL at 09:05

## 2021-05-22 RX ADMIN — INSULIN ASPART 4 UNITS: 100 INJECTION, SOLUTION INTRAVENOUS; SUBCUTANEOUS at 12:05

## 2021-05-22 RX ADMIN — FLECAINIDE ACETATE 100 MG: 100 TABLET ORAL at 09:05

## 2021-05-22 RX ADMIN — LISINOPRIL 20 MG: 20 TABLET ORAL at 09:05

## 2021-05-22 RX ADMIN — FUROSEMIDE 20 MG: 10 INJECTION, SOLUTION INTRAMUSCULAR; INTRAVENOUS at 09:05

## 2021-05-22 RX ADMIN — RIFAXIMIN 550 MG: 550 TABLET ORAL at 09:05

## 2021-05-22 RX ADMIN — INSULIN DETEMIR 30 UNITS: 100 INJECTION, SOLUTION SUBCUTANEOUS at 09:05

## 2021-05-22 RX ADMIN — AMLODIPINE BESYLATE 5 MG: 5 TABLET ORAL at 09:05

## 2021-05-22 RX ADMIN — THERA TABS 1 TABLET: TAB at 09:05

## 2021-05-22 RX ADMIN — FUROSEMIDE 20 MG: 10 INJECTION, SOLUTION INTRAMUSCULAR; INTRAVENOUS at 12:05

## 2021-05-22 RX ADMIN — ASPIRIN 81 MG: 81 TABLET, COATED ORAL at 09:05

## 2021-05-22 RX ADMIN — METOPROLOL SUCCINATE 50 MG: 50 TABLET, EXTENDED RELEASE ORAL at 09:05

## 2021-05-23 LAB
ALBUMIN SERPL BCP-MCNC: 3.2 G/DL (ref 3.5–5.2)
ALP SERPL-CCNC: 64 U/L (ref 55–135)
ALT SERPL W/O P-5'-P-CCNC: 14 U/L (ref 10–44)
ANION GAP SERPL CALC-SCNC: 10 MMOL/L (ref 8–16)
ASCENDING AORTA: 3.45 CM
AST SERPL-CCNC: 17 U/L (ref 10–40)
AV INDEX (PROSTH): 0.27
AV MEAN GRADIENT: 25 MMHG
AV PEAK GRADIENT: 38 MMHG
AV VALVE AREA: 1.21 CM2
AV VELOCITY RATIO: 0.26
BASOPHILS # BLD AUTO: 0.05 K/UL (ref 0–0.2)
BASOPHILS NFR BLD: 1 % (ref 0–1.9)
BILIRUB SERPL-MCNC: 0.4 MG/DL (ref 0.1–1)
BSA FOR ECHO PROCEDURE: 2.33 M2
BUN SERPL-MCNC: 25 MG/DL (ref 8–23)
CALCIUM SERPL-MCNC: 9.1 MG/DL (ref 8.7–10.5)
CHLORIDE SERPL-SCNC: 102 MMOL/L (ref 95–110)
CO2 SERPL-SCNC: 27 MMOL/L (ref 23–29)
CREAT SERPL-MCNC: 0.8 MG/DL (ref 0.5–1.4)
CV ECHO LV RWT: 0.41 CM
DIFFERENTIAL METHOD: ABNORMAL
DOP CALC AO PEAK VEL: 3.08 M/S
DOP CALC AO VTI: 52.93 CM
DOP CALC LVOT AREA: 4.5 CM2
DOP CALC LVOT DIAMETER: 2.4 CM
DOP CALC LVOT PEAK VEL: 0.79 M/S
DOP CALC LVOT STROKE VOLUME: 64.21 CM3
DOP CALCLVOT PEAK VEL VTI: 14.2 CM
E/E' RATIO: 8.29 M/S
ECHO LV POSTERIOR WALL: 1.01 CM (ref 0.6–1.1)
EJECTION FRACTION: 65 %
EOSINOPHIL # BLD AUTO: 0.1 K/UL (ref 0–0.5)
EOSINOPHIL NFR BLD: 2.3 % (ref 0–8)
ERYTHROCYTE [DISTWIDTH] IN BLOOD BY AUTOMATED COUNT: 13.8 % (ref 11.5–14.5)
EST. GFR  (AFRICAN AMERICAN): >60 ML/MIN/1.73 M^2
EST. GFR  (NON AFRICAN AMERICAN): >60 ML/MIN/1.73 M^2
FRACTIONAL SHORTENING: 22 % (ref 28–44)
GLUCOSE SERPL-MCNC: 228 MG/DL (ref 70–110)
HCT VFR BLD AUTO: 41 % (ref 40–54)
HGB BLD-MCNC: 13 G/DL (ref 14–18)
IMM GRANULOCYTES # BLD AUTO: 0.02 K/UL (ref 0–0.04)
IMM GRANULOCYTES NFR BLD AUTO: 0.4 % (ref 0–0.5)
INR PPP: 1.1 (ref 0.8–1.2)
INTERVENTRICULAR SEPTUM: 1.13 CM (ref 0.6–1.1)
IVRT: 65.65 MSEC
LA MAJOR: 6.39 CM
LA MINOR: 6.38 CM
LA WIDTH: 4.58 CM
LEFT ATRIUM SIZE: 4.47 CM
LEFT ATRIUM VOLUME INDEX MOD: 31.1 ML/M2
LEFT ATRIUM VOLUME INDEX: 48.7 ML/M2
LEFT ATRIUM VOLUME MOD: 71.02 CM3
LEFT ATRIUM VOLUME: 111.11 CM3
LEFT INTERNAL DIMENSION IN SYSTOLE: 3.85 CM (ref 2.1–4)
LEFT VENTRICLE DIASTOLIC VOLUME INDEX: 50.8 ML/M2
LEFT VENTRICLE DIASTOLIC VOLUME: 115.83 ML
LEFT VENTRICLE MASS INDEX: 86 G/M2
LEFT VENTRICLE SYSTOLIC VOLUME INDEX: 28 ML/M2
LEFT VENTRICLE SYSTOLIC VOLUME: 63.81 ML
LEFT VENTRICULAR INTERNAL DIMENSION IN DIASTOLE: 4.96 CM (ref 3.5–6)
LEFT VENTRICULAR MASS: 196.86 G
LV LATERAL E/E' RATIO: 8.7 M/S
LV SEPTAL E/E' RATIO: 7.91 M/S
LYMPHOCYTES # BLD AUTO: 1.2 K/UL (ref 1–4.8)
LYMPHOCYTES NFR BLD: 23.4 % (ref 18–48)
MCH RBC QN AUTO: 28.3 PG (ref 27–31)
MCHC RBC AUTO-ENTMCNC: 31.7 G/DL (ref 32–36)
MCV RBC AUTO: 89 FL (ref 82–98)
MONOCYTES # BLD AUTO: 0.7 K/UL (ref 0.3–1)
MONOCYTES NFR BLD: 13.1 % (ref 4–15)
MV PEAK E VEL: 0.87 M/S
NEUTROPHILS # BLD AUTO: 3.1 K/UL (ref 1.8–7.7)
NEUTROPHILS NFR BLD: 59.8 % (ref 38–73)
NRBC BLD-RTO: 0 /100 WBC
PISA TR MAX VEL: 2.9 M/S
PLATELET # BLD AUTO: 101 K/UL (ref 150–450)
PMV BLD AUTO: 12.1 FL (ref 9.2–12.9)
POCT GLUCOSE: 158 MG/DL (ref 70–110)
POCT GLUCOSE: 203 MG/DL (ref 70–110)
POCT GLUCOSE: 239 MG/DL (ref 70–110)
POCT GLUCOSE: 249 MG/DL (ref 70–110)
POTASSIUM SERPL-SCNC: 3.7 MMOL/L (ref 3.5–5.1)
PROT SERPL-MCNC: 6.2 G/DL (ref 6–8.4)
PROTHROMBIN TIME: 11.8 SEC (ref 9–12.5)
RA MAJOR: 5.91 CM
RA PRESSURE: 8 MMHG
RA WIDTH: 4.36 CM
RBC # BLD AUTO: 4.6 M/UL (ref 4.6–6.2)
RIGHT VENTRICULAR END-DIASTOLIC DIMENSION: 3.84 CM
RV TISSUE DOPPLER FREE WALL SYSTOLIC VELOCITY 1 (APICAL 4 CHAMBER VIEW): 11.49 CM/S
SINUS: 3.39 CM
SODIUM SERPL-SCNC: 139 MMOL/L (ref 136–145)
STJ: 2.71 CM
TDI LATERAL: 0.1 M/S
TDI SEPTAL: 0.11 M/S
TDI: 0.11 M/S
TR MAX PG: 34 MMHG
TRICUSPID ANNULAR PLANE SYSTOLIC EXCURSION: 1.55 CM
TV REST PULMONARY ARTERY PRESSURE: 42 MMHG
WBC # BLD AUTO: 5.13 K/UL (ref 3.9–12.7)

## 2021-05-23 PROCEDURE — 80053 COMPREHEN METABOLIC PANEL: CPT | Mod: NTX | Performed by: HOSPITALIST

## 2021-05-23 PROCEDURE — 36415 COLL VENOUS BLD VENIPUNCTURE: CPT | Mod: NTX | Performed by: HOSPITALIST

## 2021-05-23 PROCEDURE — 99233 PR SUBSEQUENT HOSPITAL CARE,LEVL III: ICD-10-PCS | Mod: NTX,,, | Performed by: HOSPITALIST

## 2021-05-23 PROCEDURE — 97116 GAIT TRAINING THERAPY: CPT | Mod: NTX

## 2021-05-23 PROCEDURE — 20600001 HC STEP DOWN PRIVATE ROOM: Mod: NTX

## 2021-05-23 PROCEDURE — 99233 SBSQ HOSP IP/OBS HIGH 50: CPT | Mod: NTX,,, | Performed by: HOSPITALIST

## 2021-05-23 PROCEDURE — 25000003 PHARM REV CODE 250: Mod: NTX | Performed by: HOSPITALIST

## 2021-05-23 PROCEDURE — 97161 PT EVAL LOW COMPLEX 20 MIN: CPT | Mod: NTX

## 2021-05-23 PROCEDURE — 85025 COMPLETE CBC W/AUTO DIFF WBC: CPT | Mod: NTX | Performed by: HOSPITALIST

## 2021-05-23 PROCEDURE — 85610 PROTHROMBIN TIME: CPT | Mod: NTX | Performed by: HOSPITALIST

## 2021-05-23 PROCEDURE — 63600175 PHARM REV CODE 636 W HCPCS: Mod: NTX | Performed by: HOSPITALIST

## 2021-05-23 RX ORDER — FUROSEMIDE 40 MG/1
40 TABLET ORAL 2 TIMES DAILY
Status: DISCONTINUED | OUTPATIENT
Start: 2021-05-23 | End: 2021-05-24 | Stop reason: HOSPADM

## 2021-05-23 RX ORDER — SPIRONOLACTONE 25 MG/1
50 TABLET ORAL DAILY
Status: DISCONTINUED | OUTPATIENT
Start: 2021-05-23 | End: 2021-05-24 | Stop reason: HOSPADM

## 2021-05-23 RX ADMIN — LACTULOSE 20 G: 10 SOLUTION ORAL at 09:05

## 2021-05-23 RX ADMIN — FLECAINIDE ACETATE 100 MG: 100 TABLET ORAL at 09:05

## 2021-05-23 RX ADMIN — RIFAXIMIN 550 MG: 550 TABLET ORAL at 09:05

## 2021-05-23 RX ADMIN — PANTOPRAZOLE SODIUM 20 MG: 20 TABLET, DELAYED RELEASE ORAL at 09:05

## 2021-05-23 RX ADMIN — FUROSEMIDE 20 MG: 10 INJECTION, SOLUTION INTRAMUSCULAR; INTRAVENOUS at 09:05

## 2021-05-23 RX ADMIN — MUPIROCIN: 20 OINTMENT TOPICAL at 09:05

## 2021-05-23 RX ADMIN — AMLODIPINE BESYLATE 5 MG: 5 TABLET ORAL at 09:05

## 2021-05-23 RX ADMIN — INSULIN ASPART 2 UNITS: 100 INJECTION, SOLUTION INTRAVENOUS; SUBCUTANEOUS at 09:05

## 2021-05-23 RX ADMIN — LOVASTATIN 20 MG: 10 TABLET ORAL at 09:05

## 2021-05-23 RX ADMIN — INSULIN ASPART 4 UNITS: 100 INJECTION, SOLUTION INTRAVENOUS; SUBCUTANEOUS at 05:05

## 2021-05-23 RX ADMIN — APIXABAN 5 MG: 5 TABLET, FILM COATED ORAL at 09:05

## 2021-05-23 RX ADMIN — METOPROLOL SUCCINATE 50 MG: 50 TABLET, EXTENDED RELEASE ORAL at 09:05

## 2021-05-23 RX ADMIN — THERA TABS 1 TABLET: TAB at 09:05

## 2021-05-23 RX ADMIN — ASPIRIN 81 MG: 81 TABLET, COATED ORAL at 09:05

## 2021-05-23 RX ADMIN — FUROSEMIDE 40 MG: 40 TABLET ORAL at 09:05

## 2021-05-23 RX ADMIN — SPIRONOLACTONE 50 MG: 25 TABLET, FILM COATED ORAL at 09:05

## 2021-05-23 RX ADMIN — LISINOPRIL 20 MG: 20 TABLET ORAL at 09:05

## 2021-05-23 RX ADMIN — INSULIN ASPART 4 UNITS: 100 INJECTION, SOLUTION INTRAVENOUS; SUBCUTANEOUS at 12:05

## 2021-05-23 RX ADMIN — SERTRALINE HYDROCHLORIDE 25 MG: 25 TABLET ORAL at 09:05

## 2021-05-24 VITALS
SYSTOLIC BLOOD PRESSURE: 94 MMHG | DIASTOLIC BLOOD PRESSURE: 66 MMHG | HEIGHT: 72 IN | BODY MASS INDEX: 31.83 KG/M2 | OXYGEN SATURATION: 92 % | WEIGHT: 235 LBS | RESPIRATION RATE: 18 BRPM | TEMPERATURE: 97 F | HEART RATE: 86 BPM

## 2021-05-24 LAB
ALBUMIN SERPL BCP-MCNC: 3.4 G/DL (ref 3.5–5.2)
ALP SERPL-CCNC: 59 U/L (ref 55–135)
ALT SERPL W/O P-5'-P-CCNC: 16 U/L (ref 10–44)
ANION GAP SERPL CALC-SCNC: 12 MMOL/L (ref 8–16)
AST SERPL-CCNC: 20 U/L (ref 10–40)
BASOPHILS # BLD AUTO: 0.05 K/UL (ref 0–0.2)
BASOPHILS NFR BLD: 0.8 % (ref 0–1.9)
BILIRUB SERPL-MCNC: 0.4 MG/DL (ref 0.1–1)
BSA FOR ECHO PROCEDURE: 2.33 M2
BUN SERPL-MCNC: 25 MG/DL (ref 8–23)
CALCIUM SERPL-MCNC: 9 MG/DL (ref 8.7–10.5)
CHLORIDE SERPL-SCNC: 102 MMOL/L (ref 95–110)
CO2 SERPL-SCNC: 25 MMOL/L (ref 23–29)
CREAT SERPL-MCNC: 0.9 MG/DL (ref 0.5–1.4)
DIFFERENTIAL METHOD: ABNORMAL
EOSINOPHIL # BLD AUTO: 0.1 K/UL (ref 0–0.5)
EOSINOPHIL NFR BLD: 2.2 % (ref 0–8)
ERYTHROCYTE [DISTWIDTH] IN BLOOD BY AUTOMATED COUNT: 13.6 % (ref 11.5–14.5)
EST. GFR  (AFRICAN AMERICAN): >60 ML/MIN/1.73 M^2
EST. GFR  (NON AFRICAN AMERICAN): >60 ML/MIN/1.73 M^2
GLUCOSE SERPL-MCNC: 175 MG/DL (ref 70–110)
HCT VFR BLD AUTO: 42 % (ref 40–54)
HGB BLD-MCNC: 13.6 G/DL (ref 14–18)
IMM GRANULOCYTES # BLD AUTO: 0.02 K/UL (ref 0–0.04)
IMM GRANULOCYTES NFR BLD AUTO: 0.3 % (ref 0–0.5)
INR PPP: 1.1 (ref 0.8–1.2)
LYMPHOCYTES # BLD AUTO: 1.5 K/UL (ref 1–4.8)
LYMPHOCYTES NFR BLD: 24.3 % (ref 18–48)
MCH RBC QN AUTO: 28.6 PG (ref 27–31)
MCHC RBC AUTO-ENTMCNC: 32.4 G/DL (ref 32–36)
MCV RBC AUTO: 88 FL (ref 82–98)
MONOCYTES # BLD AUTO: 0.8 K/UL (ref 0.3–1)
MONOCYTES NFR BLD: 13.1 % (ref 4–15)
NEUTROPHILS # BLD AUTO: 3.6 K/UL (ref 1.8–7.7)
NEUTROPHILS NFR BLD: 59.3 % (ref 38–73)
NRBC BLD-RTO: 0 /100 WBC
PLATELET # BLD AUTO: 113 K/UL (ref 150–450)
PMV BLD AUTO: 12.4 FL (ref 9.2–12.9)
POCT GLUCOSE: 137 MG/DL (ref 70–110)
POCT GLUCOSE: 179 MG/DL (ref 70–110)
POCT GLUCOSE: 203 MG/DL (ref 70–110)
POCT GLUCOSE: 219 MG/DL (ref 70–110)
POTASSIUM SERPL-SCNC: 3.7 MMOL/L (ref 3.5–5.1)
PROT SERPL-MCNC: 6.6 G/DL (ref 6–8.4)
PROTHROMBIN TIME: 11.5 SEC (ref 9–12.5)
RBC # BLD AUTO: 4.76 M/UL (ref 4.6–6.2)
SODIUM SERPL-SCNC: 139 MMOL/L (ref 136–145)
WBC # BLD AUTO: 6.04 K/UL (ref 3.9–12.7)

## 2021-05-24 PROCEDURE — 25000003 PHARM REV CODE 250: Mod: NTX | Performed by: HOSPITALIST

## 2021-05-24 PROCEDURE — 99239 PR HOSPITAL DISCHARGE DAY,>30 MIN: ICD-10-PCS | Mod: NTX,,, | Performed by: HOSPITALIST

## 2021-05-24 PROCEDURE — 94761 N-INVAS EAR/PLS OXIMETRY MLT: CPT | Mod: NTX

## 2021-05-24 PROCEDURE — 85025 COMPLETE CBC W/AUTO DIFF WBC: CPT | Mod: NTX | Performed by: HOSPITALIST

## 2021-05-24 PROCEDURE — 99239 HOSP IP/OBS DSCHRG MGMT >30: CPT | Mod: NTX,,, | Performed by: HOSPITALIST

## 2021-05-24 PROCEDURE — 36415 COLL VENOUS BLD VENIPUNCTURE: CPT | Mod: NTX | Performed by: HOSPITALIST

## 2021-05-24 PROCEDURE — 85610 PROTHROMBIN TIME: CPT | Mod: NTX | Performed by: HOSPITALIST

## 2021-05-24 PROCEDURE — 80053 COMPREHEN METABOLIC PANEL: CPT | Mod: NTX | Performed by: HOSPITALIST

## 2021-05-24 RX ORDER — SPIRONOLACTONE 50 MG/1
50 TABLET, FILM COATED ORAL DAILY
Qty: 30 TABLET | Refills: 2 | Status: SHIPPED | OUTPATIENT
Start: 2021-05-25 | End: 2021-05-26

## 2021-05-24 RX ORDER — FUROSEMIDE 40 MG/1
40 TABLET ORAL 2 TIMES DAILY
Qty: 60 TABLET | Refills: 2 | Status: ON HOLD | OUTPATIENT
Start: 2021-05-24 | End: 2021-06-09 | Stop reason: HOSPADM

## 2021-05-24 RX ADMIN — RIFAXIMIN 550 MG: 550 TABLET ORAL at 09:05

## 2021-05-24 RX ADMIN — PANTOPRAZOLE SODIUM 20 MG: 20 TABLET, DELAYED RELEASE ORAL at 09:05

## 2021-05-24 RX ADMIN — LISINOPRIL 20 MG: 20 TABLET ORAL at 09:05

## 2021-05-24 RX ADMIN — SPIRONOLACTONE 50 MG: 25 TABLET, FILM COATED ORAL at 09:05

## 2021-05-24 RX ADMIN — ASPIRIN 81 MG: 81 TABLET, COATED ORAL at 09:05

## 2021-05-24 RX ADMIN — SERTRALINE HYDROCHLORIDE 25 MG: 25 TABLET ORAL at 09:05

## 2021-05-24 RX ADMIN — METOPROLOL SUCCINATE 50 MG: 50 TABLET, EXTENDED RELEASE ORAL at 09:05

## 2021-05-24 RX ADMIN — AMLODIPINE BESYLATE 5 MG: 5 TABLET ORAL at 09:05

## 2021-05-24 RX ADMIN — FLECAINIDE ACETATE 100 MG: 100 TABLET ORAL at 09:05

## 2021-05-24 RX ADMIN — LACTULOSE 20 G: 10 SOLUTION ORAL at 09:05

## 2021-05-24 RX ADMIN — MUPIROCIN: 20 OINTMENT TOPICAL at 09:05

## 2021-05-24 RX ADMIN — THERA TABS 1 TABLET: TAB at 09:05

## 2021-05-24 RX ADMIN — APIXABAN 5 MG: 5 TABLET, FILM COATED ORAL at 09:05

## 2021-05-24 RX ADMIN — FUROSEMIDE 40 MG: 40 TABLET ORAL at 09:05

## 2021-05-26 ENCOUNTER — TELEPHONE (OUTPATIENT)
Dept: CARDIOLOGY | Facility: HOSPITAL | Age: 66
End: 2021-05-26

## 2021-05-26 ENCOUNTER — OFFICE VISIT (OUTPATIENT)
Dept: TRANSPLANT | Facility: CLINIC | Age: 66
End: 2021-05-26
Payer: MEDICARE

## 2021-05-26 ENCOUNTER — HOSPITAL ENCOUNTER (OUTPATIENT)
Dept: CARDIOLOGY | Facility: CLINIC | Age: 66
Discharge: HOME | End: 2021-05-26
Payer: MEDICARE

## 2021-05-26 VITALS
BODY MASS INDEX: 30.79 KG/M2 | HEART RATE: 96 BPM | DIASTOLIC BLOOD PRESSURE: 97 MMHG | WEIGHT: 227.31 LBS | OXYGEN SATURATION: 98 % | SYSTOLIC BLOOD PRESSURE: 154 MMHG | RESPIRATION RATE: 17 BRPM | HEIGHT: 72 IN | TEMPERATURE: 98 F

## 2021-05-26 DIAGNOSIS — K75.81 NASH (NONALCOHOLIC STEATOHEPATITIS): ICD-10-CM

## 2021-05-26 DIAGNOSIS — Z01.818 PRE-TRANSPLANT EVALUATION FOR LIVER TRANSPLANT: ICD-10-CM

## 2021-05-26 DIAGNOSIS — I48.0 PAROXYSMAL ATRIAL FIBRILLATION: ICD-10-CM

## 2021-05-26 DIAGNOSIS — I48.11 LONGSTANDING PERSISTENT ATRIAL FIBRILLATION: ICD-10-CM

## 2021-05-26 DIAGNOSIS — I10 ESSENTIAL HYPERTENSION: Primary | ICD-10-CM

## 2021-05-26 DIAGNOSIS — K74.60 HEPATIC CIRRHOSIS, UNSPECIFIED HEPATIC CIRRHOSIS TYPE, UNSPECIFIED WHETHER ASCITES PRESENT: ICD-10-CM

## 2021-05-26 DIAGNOSIS — R16.0 LIVER MASS: ICD-10-CM

## 2021-05-26 DIAGNOSIS — C22.0 HCC (HEPATOCELLULAR CARCINOMA): ICD-10-CM

## 2021-05-26 PROCEDURE — 1126F PR PAIN SEVERITY QUANTIFIED, NO PAIN PRESENT: ICD-10-PCS | Mod: S$GLB,TXP,, | Performed by: INTERNAL MEDICINE

## 2021-05-26 PROCEDURE — 1159F MED LIST DOCD IN RCRD: CPT | Mod: S$GLB,TXP,, | Performed by: INTERNAL MEDICINE

## 2021-05-26 PROCEDURE — 1101F PT FALLS ASSESS-DOCD LE1/YR: CPT | Mod: CPTII,S$GLB,TXP, | Performed by: INTERNAL MEDICINE

## 2021-05-26 PROCEDURE — 1159F PR MEDICATION LIST DOCUMENTED IN MEDICAL RECORD: ICD-10-PCS | Mod: S$GLB,TXP,, | Performed by: INTERNAL MEDICINE

## 2021-05-26 PROCEDURE — 3288F FALL RISK ASSESSMENT DOCD: CPT | Mod: CPTII,S$GLB,TXP, | Performed by: INTERNAL MEDICINE

## 2021-05-26 PROCEDURE — 99214 OFFICE O/P EST MOD 30 MIN: CPT | Mod: S$GLB,TXP,, | Performed by: INTERNAL MEDICINE

## 2021-05-26 PROCEDURE — 93010 RHYTHM STRIP: ICD-10-PCS | Mod: NTX,S$GLB,, | Performed by: INTERNAL MEDICINE

## 2021-05-26 PROCEDURE — 93010 ELECTROCARDIOGRAM REPORT: CPT | Mod: NTX,S$GLB,, | Performed by: INTERNAL MEDICINE

## 2021-05-26 PROCEDURE — 99999 PR PBB SHADOW E&M-EST. PATIENT-LVL V: ICD-10-PCS | Mod: PBBFAC,TXP,, | Performed by: INTERNAL MEDICINE

## 2021-05-26 PROCEDURE — 3008F PR BODY MASS INDEX (BMI) DOCUMENTED: ICD-10-PCS | Mod: CPTII,S$GLB,TXP, | Performed by: INTERNAL MEDICINE

## 2021-05-26 PROCEDURE — 1126F AMNT PAIN NOTED NONE PRSNT: CPT | Mod: S$GLB,TXP,, | Performed by: INTERNAL MEDICINE

## 2021-05-26 PROCEDURE — 93005 ELECTROCARDIOGRAM TRACING: CPT | Mod: NTX,S$GLB,, | Performed by: INTERNAL MEDICINE

## 2021-05-26 PROCEDURE — 1101F PR PT FALLS ASSESS DOC 0-1 FALLS W/OUT INJ PAST YR: ICD-10-PCS | Mod: CPTII,S$GLB,TXP, | Performed by: INTERNAL MEDICINE

## 2021-05-26 PROCEDURE — 99214 PR OFFICE/OUTPT VISIT, EST, LEVL IV, 30-39 MIN: ICD-10-PCS | Mod: S$GLB,TXP,, | Performed by: INTERNAL MEDICINE

## 2021-05-26 PROCEDURE — 99999 PR PBB SHADOW E&M-EST. PATIENT-LVL V: CPT | Mod: PBBFAC,TXP,, | Performed by: INTERNAL MEDICINE

## 2021-05-26 PROCEDURE — 3008F BODY MASS INDEX DOCD: CPT | Mod: CPTII,S$GLB,TXP, | Performed by: INTERNAL MEDICINE

## 2021-05-26 PROCEDURE — 93005 RHYTHM STRIP: ICD-10-PCS | Mod: NTX,S$GLB,, | Performed by: INTERNAL MEDICINE

## 2021-05-26 PROCEDURE — 3288F PR FALLS RISK ASSESSMENT DOCUMENTED: ICD-10-PCS | Mod: CPTII,S$GLB,TXP, | Performed by: INTERNAL MEDICINE

## 2021-05-28 ENCOUNTER — TELEPHONE (OUTPATIENT)
Dept: ELECTROPHYSIOLOGY | Facility: CLINIC | Age: 66
End: 2021-05-28

## 2021-05-28 ENCOUNTER — PATIENT MESSAGE (OUTPATIENT)
Dept: ELECTROPHYSIOLOGY | Facility: CLINIC | Age: 66
End: 2021-05-28

## 2021-05-28 DIAGNOSIS — I48.19 PERSISTENT ATRIAL FIBRILLATION: Primary | ICD-10-CM

## 2021-05-31 ENCOUNTER — ANESTHESIA EVENT (OUTPATIENT)
Dept: MEDSURG UNIT | Facility: HOSPITAL | Age: 66
End: 2021-05-31
Payer: MEDICARE

## 2021-05-31 ENCOUNTER — HOSPITAL ENCOUNTER (OUTPATIENT)
Facility: HOSPITAL | Age: 66
Discharge: HOME | End: 2021-05-31
Attending: INTERNAL MEDICINE | Admitting: INTERNAL MEDICINE
Payer: MEDICARE

## 2021-05-31 ENCOUNTER — ANESTHESIA (OUTPATIENT)
Dept: MEDSURG UNIT | Facility: HOSPITAL | Age: 66
End: 2021-05-31
Payer: MEDICARE

## 2021-05-31 ENCOUNTER — TELEPHONE (OUTPATIENT)
Dept: CARDIOLOGY | Facility: HOSPITAL | Age: 66
End: 2021-05-31

## 2021-05-31 ENCOUNTER — TELEPHONE (OUTPATIENT)
Dept: ELECTROPHYSIOLOGY | Facility: CLINIC | Age: 66
End: 2021-05-31

## 2021-05-31 VITALS
RESPIRATION RATE: 22 BRPM | BODY MASS INDEX: 29.8 KG/M2 | HEIGHT: 72 IN | DIASTOLIC BLOOD PRESSURE: 50 MMHG | OXYGEN SATURATION: 98 % | WEIGHT: 220 LBS | HEART RATE: 56 BPM | SYSTOLIC BLOOD PRESSURE: 81 MMHG | TEMPERATURE: 98 F

## 2021-05-31 DIAGNOSIS — I48.91 AFIB: ICD-10-CM

## 2021-05-31 DIAGNOSIS — I48.19 PERSISTENT ATRIAL FIBRILLATION: ICD-10-CM

## 2021-05-31 DIAGNOSIS — I48.11 LONGSTANDING PERSISTENT ATRIAL FIBRILLATION: Primary | ICD-10-CM

## 2021-05-31 DIAGNOSIS — I48.91 ATRIAL FIBRILLATION: ICD-10-CM

## 2021-05-31 LAB
POCT GLUCOSE: 120 MG/DL (ref 70–110)
POCT GLUCOSE: 158 MG/DL (ref 70–110)

## 2021-05-31 PROCEDURE — 37000009 HC ANESTHESIA EA ADD 15 MINS: Mod: TXP | Performed by: INTERNAL MEDICINE

## 2021-05-31 PROCEDURE — 82962 GLUCOSE BLOOD TEST: CPT | Mod: TXP | Performed by: INTERNAL MEDICINE

## 2021-05-31 PROCEDURE — 93010 EKG 12-LEAD: ICD-10-PCS | Mod: NTX,,, | Performed by: INTERNAL MEDICINE

## 2021-05-31 PROCEDURE — 92960 PR CARDIOVERSION, ELECTIVE;EXTERN: ICD-10-PCS | Mod: NTX,,, | Performed by: INTERNAL MEDICINE

## 2021-05-31 PROCEDURE — 93010 ELECTROCARDIOGRAM REPORT: CPT | Mod: NTX,,, | Performed by: INTERNAL MEDICINE

## 2021-05-31 PROCEDURE — 82962 GLUCOSE BLOOD TEST: CPT | Mod: NTX,91 | Performed by: INTERNAL MEDICINE

## 2021-05-31 PROCEDURE — 25000003 PHARM REV CODE 250: Mod: NTX | Performed by: INTERNAL MEDICINE

## 2021-05-31 PROCEDURE — 25000003 PHARM REV CODE 250: Mod: TXP | Performed by: INTERNAL MEDICINE

## 2021-05-31 PROCEDURE — 63600175 PHARM REV CODE 636 W HCPCS: Mod: NTX | Performed by: NURSE ANESTHETIST, CERTIFIED REGISTERED

## 2021-05-31 PROCEDURE — 93005 ELECTROCARDIOGRAM TRACING: CPT | Mod: TXP

## 2021-05-31 PROCEDURE — D9220A PRA ANESTHESIA: Mod: ANES,NTX,, | Performed by: ANESTHESIOLOGY

## 2021-05-31 PROCEDURE — 99499 NO LOS: ICD-10-PCS | Mod: NTX,,, | Performed by: INTERNAL MEDICINE

## 2021-05-31 PROCEDURE — 92960 CARDIOVERSION ELECTRIC EXT: CPT | Mod: NTX,,, | Performed by: INTERNAL MEDICINE

## 2021-05-31 PROCEDURE — D9220A PRA ANESTHESIA: ICD-10-PCS | Mod: ANES,NTX,, | Performed by: ANESTHESIOLOGY

## 2021-05-31 PROCEDURE — D9220A PRA ANESTHESIA: Mod: CRNA,NTX,, | Performed by: NURSE ANESTHETIST, CERTIFIED REGISTERED

## 2021-05-31 PROCEDURE — 92960 CARDIOVERSION ELECTRIC EXT: CPT | Mod: NTX | Performed by: INTERNAL MEDICINE

## 2021-05-31 PROCEDURE — 93010 ELECTROCARDIOGRAM REPORT: CPT | Mod: 59,76,NTX, | Performed by: INTERNAL MEDICINE

## 2021-05-31 PROCEDURE — 37000008 HC ANESTHESIA 1ST 15 MINUTES: Mod: TXP | Performed by: INTERNAL MEDICINE

## 2021-05-31 PROCEDURE — 99499 UNLISTED E&M SERVICE: CPT | Mod: NTX,,, | Performed by: INTERNAL MEDICINE

## 2021-05-31 PROCEDURE — 25000003 PHARM REV CODE 250: Mod: TXP | Performed by: NURSE ANESTHETIST, CERTIFIED REGISTERED

## 2021-05-31 PROCEDURE — 93005 ELECTROCARDIOGRAM TRACING: CPT | Mod: NTX

## 2021-05-31 PROCEDURE — D9220A PRA ANESTHESIA: ICD-10-PCS | Mod: CRNA,NTX,, | Performed by: NURSE ANESTHETIST, CERTIFIED REGISTERED

## 2021-05-31 RX ORDER — LIDOCAINE HYDROCHLORIDE 20 MG/ML
INJECTION INTRAVENOUS
Status: DISCONTINUED | OUTPATIENT
Start: 2021-05-31 | End: 2021-05-31

## 2021-05-31 RX ORDER — TALC
6 POWDER (GRAM) TOPICAL NIGHTLY PRN
COMMUNITY
End: 2021-08-11

## 2021-05-31 RX ORDER — SODIUM CHLORIDE 0.9 % (FLUSH) 0.9 %
3 SYRINGE (ML) INJECTION
Status: DISCONTINUED | OUTPATIENT
Start: 2021-05-31 | End: 2021-05-31 | Stop reason: HOSPADM

## 2021-05-31 RX ORDER — DIPHENHYDRAMINE HYDROCHLORIDE 50 MG/ML
25 INJECTION INTRAMUSCULAR; INTRAVENOUS EVERY 6 HOURS PRN
Status: DISCONTINUED | OUTPATIENT
Start: 2021-05-31 | End: 2021-05-31 | Stop reason: HOSPADM

## 2021-05-31 RX ORDER — PHENYLEPHRINE HYDROCHLORIDE 10 MG/ML
INJECTION INTRAVENOUS
Status: DISCONTINUED | OUTPATIENT
Start: 2021-05-31 | End: 2021-05-31

## 2021-05-31 RX ORDER — MIDAZOLAM HYDROCHLORIDE 1 MG/ML
INJECTION INTRAMUSCULAR; INTRAVENOUS
Status: DISCONTINUED | OUTPATIENT
Start: 2021-05-31 | End: 2021-05-31

## 2021-05-31 RX ORDER — PROPOFOL 10 MG/ML
VIAL (ML) INTRAVENOUS
Status: DISCONTINUED | OUTPATIENT
Start: 2021-05-31 | End: 2021-05-31

## 2021-05-31 RX ORDER — FLECAINIDE ACETATE 150 MG/1
150 TABLET ORAL EVERY 12 HOURS
Qty: 180 TABLET | Refills: 3 | Status: ON HOLD | OUTPATIENT
Start: 2021-05-31 | End: 2021-06-09 | Stop reason: HOSPADM

## 2021-05-31 RX ORDER — EPHEDRINE SULFATE 50 MG/ML
INJECTION, SOLUTION INTRAVENOUS
Status: DISCONTINUED | OUTPATIENT
Start: 2021-05-31 | End: 2021-05-31

## 2021-05-31 RX ORDER — ONDANSETRON 2 MG/ML
4 INJECTION INTRAMUSCULAR; INTRAVENOUS ONCE AS NEEDED
Status: DISCONTINUED | OUTPATIENT
Start: 2021-05-31 | End: 2021-05-31 | Stop reason: HOSPADM

## 2021-05-31 RX ORDER — SILVER SULFADIAZINE 10 G/1000G
CREAM TOPICAL
Status: DISCONTINUED | OUTPATIENT
Start: 2021-05-31 | End: 2021-05-31 | Stop reason: HOSPADM

## 2021-05-31 RX ORDER — FENTANYL CITRATE 50 UG/ML
25 INJECTION, SOLUTION INTRAMUSCULAR; INTRAVENOUS EVERY 5 MIN PRN
Status: DISCONTINUED | OUTPATIENT
Start: 2021-05-31 | End: 2021-05-31 | Stop reason: HOSPADM

## 2021-05-31 RX ORDER — SODIUM CHLORIDE 9 MG/ML
INJECTION, SOLUTION INTRAVENOUS CONTINUOUS PRN
Status: DISCONTINUED | OUTPATIENT
Start: 2021-05-31 | End: 2021-05-31

## 2021-05-31 RX ADMIN — EPHEDRINE SULFATE 25 MG: 50 INJECTION, SOLUTION INTRAMUSCULAR; INTRAVENOUS; SUBCUTANEOUS at 12:05

## 2021-05-31 RX ADMIN — PROPOFOL 80 MG: 10 INJECTION, EMULSION INTRAVENOUS at 11:05

## 2021-05-31 RX ADMIN — SODIUM CHLORIDE: 0.9 INJECTION, SOLUTION INTRAVENOUS at 11:05

## 2021-05-31 RX ADMIN — PHENYLEPHRINE HYDROCHLORIDE 200 MCG: 10 INJECTION, SOLUTION INTRAMUSCULAR; INTRAVENOUS; SUBCUTANEOUS at 11:05

## 2021-05-31 RX ADMIN — PHENYLEPHRINE HYDROCHLORIDE 300 MCG: 10 INJECTION, SOLUTION INTRAMUSCULAR; INTRAVENOUS; SUBCUTANEOUS at 12:05

## 2021-05-31 RX ADMIN — MIDAZOLAM HYDROCHLORIDE 2 MG: 1 INJECTION, SOLUTION INTRAMUSCULAR; INTRAVENOUS at 11:05

## 2021-05-31 RX ADMIN — LIDOCAINE HYDROCHLORIDE 50 MG: 20 INJECTION, SOLUTION INTRAVENOUS at 11:05

## 2021-06-04 ENCOUNTER — TELEPHONE (OUTPATIENT)
Dept: TRANSPLANT | Facility: CLINIC | Age: 66
End: 2021-06-04

## 2021-06-07 ENCOUNTER — TELEPHONE (OUTPATIENT)
Dept: TRANSPLANT | Facility: CLINIC | Age: 66
End: 2021-06-07

## 2021-06-07 ENCOUNTER — HOSPITAL ENCOUNTER (OUTPATIENT)
Dept: CARDIOLOGY | Facility: CLINIC | Age: 66
Discharge: HOME | DRG: 683 | End: 2021-06-07
Payer: MEDICARE

## 2021-06-07 ENCOUNTER — PATIENT MESSAGE (OUTPATIENT)
Dept: TRANSPLANT | Facility: CLINIC | Age: 66
End: 2021-06-07

## 2021-06-07 DIAGNOSIS — I48.19 PERSISTENT ATRIAL FIBRILLATION: ICD-10-CM

## 2021-06-07 PROCEDURE — 93005 ELECTROCARDIOGRAM TRACING: CPT | Mod: NTX,S$GLB,, | Performed by: INTERNAL MEDICINE

## 2021-06-07 PROCEDURE — 93010 RHYTHM STRIP: ICD-10-PCS | Mod: NTX,S$GLB,, | Performed by: INTERNAL MEDICINE

## 2021-06-07 PROCEDURE — 93005 RHYTHM STRIP: ICD-10-PCS | Mod: NTX,S$GLB,, | Performed by: INTERNAL MEDICINE

## 2021-06-07 PROCEDURE — 93010 ELECTROCARDIOGRAM REPORT: CPT | Mod: NTX,S$GLB,, | Performed by: INTERNAL MEDICINE

## 2021-06-08 ENCOUNTER — HOSPITAL ENCOUNTER (INPATIENT)
Facility: HOSPITAL | Age: 66
LOS: 1 days | Discharge: HOME | DRG: 683 | End: 2021-06-09
Attending: EMERGENCY MEDICINE | Admitting: HOSPITALIST
Payer: MEDICARE

## 2021-06-08 ENCOUNTER — TELEPHONE (OUTPATIENT)
Dept: TRANSPLANT | Facility: HOSPITAL | Age: 66
End: 2021-06-08

## 2021-06-08 ENCOUNTER — OFFICE VISIT (OUTPATIENT)
Dept: CARDIOLOGY | Facility: CLINIC | Age: 66
DRG: 683 | End: 2021-06-08
Payer: MEDICARE

## 2021-06-08 VITALS
BODY MASS INDEX: 31.12 KG/M2 | SYSTOLIC BLOOD PRESSURE: 78 MMHG | HEART RATE: 85 BPM | DIASTOLIC BLOOD PRESSURE: 44 MMHG | WEIGHT: 229.75 LBS | HEIGHT: 72 IN

## 2021-06-08 DIAGNOSIS — I95.9 HYPOTENSION, UNSPECIFIED HYPOTENSION TYPE: Primary | ICD-10-CM

## 2021-06-08 DIAGNOSIS — I95.9 HYPOTENSION: ICD-10-CM

## 2021-06-08 DIAGNOSIS — E78.5 HYPERLIPIDEMIA, UNSPECIFIED HYPERLIPIDEMIA TYPE: ICD-10-CM

## 2021-06-08 DIAGNOSIS — I10 ESSENTIAL HYPERTENSION: ICD-10-CM

## 2021-06-08 DIAGNOSIS — N17.9 AKI (ACUTE KIDNEY INJURY): Primary | ICD-10-CM

## 2021-06-08 DIAGNOSIS — R94.31 PROLONGATION OF QRS COMPLEX ON ELECTROCARDIOGRAPHY: ICD-10-CM

## 2021-06-08 DIAGNOSIS — C22.0 HCC (HEPATOCELLULAR CARCINOMA): ICD-10-CM

## 2021-06-08 DIAGNOSIS — I73.9 PAD (PERIPHERAL ARTERY DISEASE): ICD-10-CM

## 2021-06-08 DIAGNOSIS — R94.31 QT PROLONGATION: ICD-10-CM

## 2021-06-08 DIAGNOSIS — E11.9 TYPE 2 DIABETES MELLITUS WITHOUT COMPLICATION, UNSPECIFIED WHETHER LONG TERM INSULIN USE: ICD-10-CM

## 2021-06-08 DIAGNOSIS — I48.0 PAROXYSMAL ATRIAL FIBRILLATION: ICD-10-CM

## 2021-06-08 DIAGNOSIS — Z87.891 FORMER SMOKER, STOPPED SMOKING MANY YEARS AGO: ICD-10-CM

## 2021-06-08 LAB
ALBUMIN SERPL BCP-MCNC: 3.9 G/DL (ref 3.5–5.2)
ALP SERPL-CCNC: 66 U/L (ref 55–135)
ALT SERPL W/O P-5'-P-CCNC: 16 U/L (ref 10–44)
AMMONIA PLAS-SCNC: 35 UMOL/L (ref 10–50)
ANION GAP SERPL CALC-SCNC: 16 MMOL/L (ref 8–16)
AST SERPL-CCNC: 22 U/L (ref 10–40)
BASOPHILS # BLD AUTO: 0.11 K/UL (ref 0–0.2)
BASOPHILS NFR BLD: 1 % (ref 0–1.9)
BILIRUB SERPL-MCNC: 0.5 MG/DL (ref 0.1–1)
BNP SERPL-MCNC: 154 PG/ML (ref 0–99)
BUN SERPL-MCNC: 57 MG/DL (ref 8–23)
CALCIUM SERPL-MCNC: 9.3 MG/DL (ref 8.7–10.5)
CHLORIDE SERPL-SCNC: 99 MMOL/L (ref 95–110)
CO2 SERPL-SCNC: 18 MMOL/L (ref 23–29)
CREAT SERPL-MCNC: 2.3 MG/DL (ref 0.5–1.4)
CREAT UR-MCNC: 113 MG/DL (ref 23–375)
CTP QC/QA: YES
DIFFERENTIAL METHOD: ABNORMAL
EOSINOPHIL # BLD AUTO: 0.3 K/UL (ref 0–0.5)
EOSINOPHIL NFR BLD: 2.4 % (ref 0–8)
EOSINOPHIL URNS QL WRIGHT STN: NORMAL
ERYTHROCYTE [DISTWIDTH] IN BLOOD BY AUTOMATED COUNT: 13.7 % (ref 11.5–14.5)
EST. GFR  (AFRICAN AMERICAN): 32.7 ML/MIN/1.73 M^2
EST. GFR  (NON AFRICAN AMERICAN): 28.3 ML/MIN/1.73 M^2
ESTIMATED AVG GLUCOSE: 180 MG/DL (ref 68–131)
GLUCOSE SERPL-MCNC: 116 MG/DL (ref 70–110)
HBA1C MFR BLD: 7.9 % (ref 4–5.6)
HCT VFR BLD AUTO: 44.2 % (ref 40–54)
HGB BLD-MCNC: 14.6 G/DL (ref 14–18)
IMM GRANULOCYTES # BLD AUTO: 0.1 K/UL (ref 0–0.04)
IMM GRANULOCYTES NFR BLD AUTO: 0.9 % (ref 0–0.5)
LYMPHOCYTES # BLD AUTO: 2 K/UL (ref 1–4.8)
LYMPHOCYTES NFR BLD: 17.2 % (ref 18–48)
MCH RBC QN AUTO: 29 PG (ref 27–31)
MCHC RBC AUTO-ENTMCNC: 33 G/DL (ref 32–36)
MCV RBC AUTO: 88 FL (ref 82–98)
MONOCYTES # BLD AUTO: 1.4 K/UL (ref 0.3–1)
MONOCYTES NFR BLD: 11.9 % (ref 4–15)
NEUTROPHILS # BLD AUTO: 7.6 K/UL (ref 1.8–7.7)
NEUTROPHILS NFR BLD: 66.6 % (ref 38–73)
NRBC BLD-RTO: 0 /100 WBC
PLATELET # BLD AUTO: 167 K/UL (ref 150–450)
PMV BLD AUTO: 11.5 FL (ref 9.2–12.9)
POCT GLUCOSE: 160 MG/DL (ref 70–110)
POTASSIUM SERPL-SCNC: 4.7 MMOL/L (ref 3.5–5.1)
PROT SERPL-MCNC: 7.6 G/DL (ref 6–8.4)
RBC # BLD AUTO: 5.03 M/UL (ref 4.6–6.2)
SARS-COV-2 RDRP RESP QL NAA+PROBE: NEGATIVE
SODIUM SERPL-SCNC: 133 MMOL/L (ref 136–145)
SODIUM UR-SCNC: 48 MMOL/L (ref 20–250)
TROPONIN I SERPL DL<=0.01 NG/ML-MCNC: 0.01 NG/ML (ref 0–0.03)
UUN UR-MCNC: 523 MG/DL (ref 140–1050)
WBC # BLD AUTO: 11.44 K/UL (ref 3.9–12.7)

## 2021-06-08 PROCEDURE — 84300 ASSAY OF URINE SODIUM: CPT | Mod: NTX | Performed by: HOSPITALIST

## 2021-06-08 PROCEDURE — 1111F DSCHRG MED/CURRENT MED MERGE: CPT | Mod: CPTII,S$GLB,TXP, | Performed by: INTERNAL MEDICINE

## 2021-06-08 PROCEDURE — 99285 EMERGENCY DEPT VISIT HI MDM: CPT | Mod: 25,NTX

## 2021-06-08 PROCEDURE — 99223 PR INITIAL HOSPITAL CARE,LEVL III: ICD-10-PCS | Mod: AI,NTX,, | Performed by: HOSPITALIST

## 2021-06-08 PROCEDURE — 84540 ASSAY OF URINE/UREA-N: CPT | Mod: NTX | Performed by: HOSPITALIST

## 2021-06-08 PROCEDURE — 83036 HEMOGLOBIN GLYCOSYLATED A1C: CPT | Mod: NTX | Performed by: STUDENT IN AN ORGANIZED HEALTH CARE EDUCATION/TRAINING PROGRAM

## 2021-06-08 PROCEDURE — 11000001 HC ACUTE MED/SURG PRIVATE ROOM: Mod: NTX

## 2021-06-08 PROCEDURE — 82570 ASSAY OF URINE CREATININE: CPT | Mod: NTX | Performed by: HOSPITALIST

## 2021-06-08 PROCEDURE — 80053 COMPREHEN METABOLIC PANEL: CPT | Mod: NTX | Performed by: STUDENT IN AN ORGANIZED HEALTH CARE EDUCATION/TRAINING PROGRAM

## 2021-06-08 PROCEDURE — 99214 PR OFFICE/OUTPT VISIT, EST, LEVL IV, 30-39 MIN: ICD-10-PCS | Mod: S$GLB,TXP,, | Performed by: INTERNAL MEDICINE

## 2021-06-08 PROCEDURE — 93005 ELECTROCARDIOGRAM TRACING: CPT | Mod: NTX

## 2021-06-08 PROCEDURE — 1159F PR MEDICATION LIST DOCUMENTED IN MEDICAL RECORD: ICD-10-PCS | Mod: S$GLB,TXP,, | Performed by: INTERNAL MEDICINE

## 2021-06-08 PROCEDURE — U0002 COVID-19 LAB TEST NON-CDC: HCPCS | Mod: NTX | Performed by: STUDENT IN AN ORGANIZED HEALTH CARE EDUCATION/TRAINING PROGRAM

## 2021-06-08 PROCEDURE — 93010 EKG 12-LEAD: ICD-10-PCS | Mod: NTX,,, | Performed by: INTERNAL MEDICINE

## 2021-06-08 PROCEDURE — 87205 SMEAR GRAM STAIN: CPT | Mod: NTX | Performed by: HOSPITALIST

## 2021-06-08 PROCEDURE — 99285 PR EMERGENCY DEPT VISIT,LEVEL V: ICD-10-PCS | Mod: CS,,, | Performed by: EMERGENCY MEDICINE

## 2021-06-08 PROCEDURE — 83880 ASSAY OF NATRIURETIC PEPTIDE: CPT | Mod: NTX | Performed by: STUDENT IN AN ORGANIZED HEALTH CARE EDUCATION/TRAINING PROGRAM

## 2021-06-08 PROCEDURE — 1125F PR PAIN SEVERITY QUANTIFIED, PAIN PRESENT: ICD-10-PCS | Mod: S$GLB,TXP,, | Performed by: INTERNAL MEDICINE

## 2021-06-08 PROCEDURE — 93010 EKG 12-LEAD: ICD-10-PCS | Mod: TXP,,, | Performed by: INTERNAL MEDICINE

## 2021-06-08 PROCEDURE — 99999 PR PBB SHADOW E&M-EST. PATIENT-LVL IV: ICD-10-PCS | Mod: PBBFAC,TXP,, | Performed by: INTERNAL MEDICINE

## 2021-06-08 PROCEDURE — 99223 1ST HOSP IP/OBS HIGH 75: CPT | Mod: AI,NTX,, | Performed by: HOSPITALIST

## 2021-06-08 PROCEDURE — 1111F PR DISCHARGE MEDS RECONCILED W/ CURRENT OUTPATIENT MED LIST: ICD-10-PCS | Mod: CPTII,S$GLB,TXP, | Performed by: INTERNAL MEDICINE

## 2021-06-08 PROCEDURE — 3008F PR BODY MASS INDEX (BMI) DOCUMENTED: ICD-10-PCS | Mod: CPTII,S$GLB,TXP, | Performed by: INTERNAL MEDICINE

## 2021-06-08 PROCEDURE — 1159F MED LIST DOCD IN RCRD: CPT | Mod: S$GLB,TXP,, | Performed by: INTERNAL MEDICINE

## 2021-06-08 PROCEDURE — 93010 ELECTROCARDIOGRAM REPORT: CPT | Mod: NTX,,, | Performed by: INTERNAL MEDICINE

## 2021-06-08 PROCEDURE — 82140 ASSAY OF AMMONIA: CPT | Mod: NTX | Performed by: HOSPITALIST

## 2021-06-08 PROCEDURE — 96360 HYDRATION IV INFUSION INIT: CPT | Mod: NTX

## 2021-06-08 PROCEDURE — 99999 PR PBB SHADOW E&M-EST. PATIENT-LVL IV: CPT | Mod: PBBFAC,TXP,, | Performed by: INTERNAL MEDICINE

## 2021-06-08 PROCEDURE — 25000003 PHARM REV CODE 250: Mod: NTX | Performed by: STUDENT IN AN ORGANIZED HEALTH CARE EDUCATION/TRAINING PROGRAM

## 2021-06-08 PROCEDURE — 1125F AMNT PAIN NOTED PAIN PRSNT: CPT | Mod: S$GLB,TXP,, | Performed by: INTERNAL MEDICINE

## 2021-06-08 PROCEDURE — 84484 ASSAY OF TROPONIN QUANT: CPT | Mod: NTX | Performed by: STUDENT IN AN ORGANIZED HEALTH CARE EDUCATION/TRAINING PROGRAM

## 2021-06-08 PROCEDURE — 99285 EMERGENCY DEPT VISIT HI MDM: CPT | Mod: CS,,, | Performed by: EMERGENCY MEDICINE

## 2021-06-08 PROCEDURE — 25000003 PHARM REV CODE 250: Mod: NTX | Performed by: HOSPITALIST

## 2021-06-08 PROCEDURE — 63600175 PHARM REV CODE 636 W HCPCS: Mod: NTX | Performed by: HOSPITALIST

## 2021-06-08 PROCEDURE — 93010 ELECTROCARDIOGRAM REPORT: CPT | Mod: TXP,,, | Performed by: INTERNAL MEDICINE

## 2021-06-08 PROCEDURE — 99223 1ST HOSP IP/OBS HIGH 75: CPT | Mod: NTX,,, | Performed by: INTERNAL MEDICINE

## 2021-06-08 PROCEDURE — 85025 COMPLETE CBC W/AUTO DIFF WBC: CPT | Mod: NTX | Performed by: STUDENT IN AN ORGANIZED HEALTH CARE EDUCATION/TRAINING PROGRAM

## 2021-06-08 PROCEDURE — 99223 PR INITIAL HOSPITAL CARE,LEVL III: ICD-10-PCS | Mod: NTX,,, | Performed by: INTERNAL MEDICINE

## 2021-06-08 PROCEDURE — 3008F BODY MASS INDEX DOCD: CPT | Mod: CPTII,S$GLB,TXP, | Performed by: INTERNAL MEDICINE

## 2021-06-08 PROCEDURE — 99214 OFFICE O/P EST MOD 30 MIN: CPT | Mod: S$GLB,TXP,, | Performed by: INTERNAL MEDICINE

## 2021-06-08 PROCEDURE — 93005 ELECTROCARDIOGRAM TRACING: CPT | Mod: TXP

## 2021-06-08 RX ORDER — ACETAMINOPHEN 325 MG/1
650 TABLET ORAL EVERY 6 HOURS PRN
Status: DISCONTINUED | OUTPATIENT
Start: 2021-06-08 | End: 2021-06-08

## 2021-06-08 RX ORDER — GLUCAGON 1 MG
1 KIT INJECTION
Status: DISCONTINUED | OUTPATIENT
Start: 2021-06-08 | End: 2021-06-09

## 2021-06-08 RX ORDER — IBUPROFEN 200 MG
24 TABLET ORAL
Status: DISCONTINUED | OUTPATIENT
Start: 2021-06-08 | End: 2021-06-09

## 2021-06-08 RX ORDER — SODIUM CHLORIDE 0.9 % (FLUSH) 0.9 %
10 SYRINGE (ML) INJECTION
Status: DISCONTINUED | OUTPATIENT
Start: 2021-06-08 | End: 2021-06-09 | Stop reason: HOSPADM

## 2021-06-08 RX ORDER — SODIUM CHLORIDE, SODIUM LACTATE, POTASSIUM CHLORIDE, CALCIUM CHLORIDE 600; 310; 30; 20 MG/100ML; MG/100ML; MG/100ML; MG/100ML
INJECTION, SOLUTION INTRAVENOUS CONTINUOUS
Status: DISCONTINUED | OUTPATIENT
Start: 2021-06-08 | End: 2021-06-09

## 2021-06-08 RX ORDER — METOPROLOL SUCCINATE 50 MG/1
50 TABLET, EXTENDED RELEASE ORAL DAILY
Status: DISCONTINUED | OUTPATIENT
Start: 2021-06-09 | End: 2021-06-09 | Stop reason: HOSPADM

## 2021-06-08 RX ORDER — ACETAMINOPHEN 500 MG
500 TABLET ORAL EVERY 6 HOURS PRN
Status: DISCONTINUED | OUTPATIENT
Start: 2021-06-08 | End: 2021-06-09 | Stop reason: HOSPADM

## 2021-06-08 RX ORDER — IBUPROFEN 200 MG
16 TABLET ORAL
Status: DISCONTINUED | OUTPATIENT
Start: 2021-06-08 | End: 2021-06-09

## 2021-06-08 RX ORDER — PANTOPRAZOLE SODIUM 20 MG/1
20 TABLET, DELAYED RELEASE ORAL DAILY
Status: DISCONTINUED | OUTPATIENT
Start: 2021-06-08 | End: 2021-06-09 | Stop reason: HOSPADM

## 2021-06-08 RX ADMIN — SODIUM CHLORIDE, SODIUM LACTATE, POTASSIUM CHLORIDE, AND CALCIUM CHLORIDE 500 ML: .6; .31; .03; .02 INJECTION, SOLUTION INTRAVENOUS at 04:06

## 2021-06-08 RX ADMIN — SODIUM CHLORIDE 250 ML: 0.9 INJECTION, SOLUTION INTRAVENOUS at 02:06

## 2021-06-08 RX ADMIN — RIFAXIMIN 550 MG: 550 TABLET ORAL at 08:06

## 2021-06-08 RX ADMIN — APIXABAN 5 MG: 5 TABLET, FILM COATED ORAL at 08:06

## 2021-06-08 RX ADMIN — SODIUM CHLORIDE, SODIUM LACTATE, POTASSIUM CHLORIDE, AND CALCIUM CHLORIDE: .6; .31; .03; .02 INJECTION, SOLUTION INTRAVENOUS at 06:06

## 2021-06-08 RX ADMIN — SODIUM CHLORIDE 500 ML: 0.9 INJECTION, SOLUTION INTRAVENOUS at 12:06

## 2021-06-09 VITALS
OXYGEN SATURATION: 93 % | HEART RATE: 74 BPM | BODY MASS INDEX: 31.42 KG/M2 | TEMPERATURE: 99 F | SYSTOLIC BLOOD PRESSURE: 119 MMHG | HEIGHT: 72 IN | WEIGHT: 231.94 LBS | RESPIRATION RATE: 17 BRPM | DIASTOLIC BLOOD PRESSURE: 70 MMHG

## 2021-06-09 PROBLEM — N17.9 AKI (ACUTE KIDNEY INJURY): Status: RESOLVED | Noted: 2021-06-08 | Resolved: 2021-06-09

## 2021-06-09 PROBLEM — I50.33 ACUTE ON CHRONIC DIASTOLIC HEART FAILURE: Status: RESOLVED | Noted: 2021-05-21 | Resolved: 2021-06-09

## 2021-06-09 LAB
ALBUMIN SERPL BCP-MCNC: 3.5 G/DL (ref 3.5–5.2)
ALBUMIN SERPL BCP-MCNC: 3.5 G/DL (ref 3.5–5.2)
ALP SERPL-CCNC: 67 U/L (ref 55–135)
ALT SERPL W/O P-5'-P-CCNC: 13 U/L (ref 10–44)
ANION GAP SERPL CALC-SCNC: 10 MMOL/L (ref 8–16)
ANION GAP SERPL CALC-SCNC: 12 MMOL/L (ref 8–16)
AST SERPL-CCNC: 22 U/L (ref 10–40)
BASOPHILS # BLD AUTO: 0.06 K/UL (ref 0–0.2)
BASOPHILS NFR BLD: 0.7 % (ref 0–1.9)
BILIRUB SERPL-MCNC: 0.5 MG/DL (ref 0.1–1)
BUN SERPL-MCNC: 30 MG/DL (ref 8–23)
BUN SERPL-MCNC: 47 MG/DL (ref 8–23)
CALCIUM SERPL-MCNC: 9.1 MG/DL (ref 8.7–10.5)
CALCIUM SERPL-MCNC: 9.3 MG/DL (ref 8.7–10.5)
CHLORIDE SERPL-SCNC: 101 MMOL/L (ref 95–110)
CHLORIDE SERPL-SCNC: 102 MMOL/L (ref 95–110)
CHOLEST SERPL-MCNC: 138 MG/DL (ref 120–199)
CHOLEST/HDLC SERPL: 4.8 {RATIO} (ref 2–5)
CO2 SERPL-SCNC: 23 MMOL/L (ref 23–29)
CO2 SERPL-SCNC: 23 MMOL/L (ref 23–29)
CREAT SERPL-MCNC: 1.2 MG/DL (ref 0.5–1.4)
CREAT SERPL-MCNC: 1.6 MG/DL (ref 0.5–1.4)
DIFFERENTIAL METHOD: ABNORMAL
EOSINOPHIL # BLD AUTO: 0.2 K/UL (ref 0–0.5)
EOSINOPHIL NFR BLD: 2.6 % (ref 0–8)
ERYTHROCYTE [DISTWIDTH] IN BLOOD BY AUTOMATED COUNT: 13.6 % (ref 11.5–14.5)
EST. GFR  (AFRICAN AMERICAN): 50.8 ML/MIN/1.73 M^2
EST. GFR  (AFRICAN AMERICAN): >60 ML/MIN/1.73 M^2
EST. GFR  (NON AFRICAN AMERICAN): 43.9 ML/MIN/1.73 M^2
EST. GFR  (NON AFRICAN AMERICAN): >60 ML/MIN/1.73 M^2
GLUCOSE SERPL-MCNC: 128 MG/DL (ref 70–110)
GLUCOSE SERPL-MCNC: 207 MG/DL (ref 70–110)
HCT VFR BLD AUTO: 41.9 % (ref 40–54)
HDLC SERPL-MCNC: 29 MG/DL (ref 40–75)
HDLC SERPL: 21 % (ref 20–50)
HGB BLD-MCNC: 13.6 G/DL (ref 14–18)
IMM GRANULOCYTES # BLD AUTO: 0.06 K/UL (ref 0–0.04)
IMM GRANULOCYTES NFR BLD AUTO: 0.7 % (ref 0–0.5)
LDLC SERPL CALC-MCNC: 61 MG/DL (ref 63–159)
LYMPHOCYTES # BLD AUTO: 1.6 K/UL (ref 1–4.8)
LYMPHOCYTES NFR BLD: 20.3 % (ref 18–48)
MAGNESIUM SERPL-MCNC: 1.5 MG/DL (ref 1.6–2.6)
MCH RBC QN AUTO: 28.6 PG (ref 27–31)
MCHC RBC AUTO-ENTMCNC: 32.5 G/DL (ref 32–36)
MCV RBC AUTO: 88 FL (ref 82–98)
MONOCYTES # BLD AUTO: 0.9 K/UL (ref 0.3–1)
MONOCYTES NFR BLD: 10.6 % (ref 4–15)
NEUTROPHILS # BLD AUTO: 5.2 K/UL (ref 1.8–7.7)
NEUTROPHILS NFR BLD: 65.1 % (ref 38–73)
NONHDLC SERPL-MCNC: 109 MG/DL
NRBC BLD-RTO: 0 /100 WBC
PHOSPHATE SERPL-MCNC: 2.6 MG/DL (ref 2.7–4.5)
PHOSPHATE SERPL-MCNC: 3.4 MG/DL (ref 2.7–4.5)
PLATELET # BLD AUTO: 128 K/UL (ref 150–450)
PMV BLD AUTO: 11.7 FL (ref 9.2–12.9)
POCT GLUCOSE: 141 MG/DL (ref 70–110)
POCT GLUCOSE: 166 MG/DL (ref 70–110)
POCT GLUCOSE: 198 MG/DL (ref 70–110)
POTASSIUM SERPL-SCNC: 4.4 MMOL/L (ref 3.5–5.1)
POTASSIUM SERPL-SCNC: 4.5 MMOL/L (ref 3.5–5.1)
PROT SERPL-MCNC: 6.9 G/DL (ref 6–8.4)
RBC # BLD AUTO: 4.76 M/UL (ref 4.6–6.2)
SODIUM SERPL-SCNC: 135 MMOL/L (ref 136–145)
SODIUM SERPL-SCNC: 136 MMOL/L (ref 136–145)
TRIGL SERPL-MCNC: 240 MG/DL (ref 30–150)
WBC # BLD AUTO: 8.04 K/UL (ref 3.9–12.7)

## 2021-06-09 PROCEDURE — 36415 COLL VENOUS BLD VENIPUNCTURE: CPT | Mod: NTX | Performed by: HOSPITALIST

## 2021-06-09 PROCEDURE — 93005 ELECTROCARDIOGRAM TRACING: CPT | Mod: NTX

## 2021-06-09 PROCEDURE — 93010 EKG 12-LEAD: ICD-10-PCS | Mod: 59,NTX,, | Performed by: INTERNAL MEDICINE

## 2021-06-09 PROCEDURE — 99239 PR HOSPITAL DISCHARGE DAY,>30 MIN: ICD-10-PCS | Mod: NTX,,, | Performed by: HOSPITALIST

## 2021-06-09 PROCEDURE — 93010 ELECTROCARDIOGRAM REPORT: CPT | Mod: 59,NTX,, | Performed by: INTERNAL MEDICINE

## 2021-06-09 PROCEDURE — 80053 COMPREHEN METABOLIC PANEL: CPT | Mod: NTX | Performed by: HOSPITALIST

## 2021-06-09 PROCEDURE — 63600175 PHARM REV CODE 636 W HCPCS: Mod: NTX | Performed by: HOSPITALIST

## 2021-06-09 PROCEDURE — 25000003 PHARM REV CODE 250: Mod: NTX | Performed by: HOSPITALIST

## 2021-06-09 PROCEDURE — 83735 ASSAY OF MAGNESIUM: CPT | Mod: NTX | Performed by: HOSPITALIST

## 2021-06-09 PROCEDURE — 99239 HOSP IP/OBS DSCHRG MGMT >30: CPT | Mod: NTX,,, | Performed by: HOSPITALIST

## 2021-06-09 PROCEDURE — 93010 ELECTROCARDIOGRAM REPORT: CPT | Mod: NTX,,, | Performed by: INTERNAL MEDICINE

## 2021-06-09 PROCEDURE — 80061 LIPID PANEL: CPT | Mod: NTX | Performed by: HOSPITALIST

## 2021-06-09 PROCEDURE — 80069 RENAL FUNCTION PANEL: CPT | Mod: NTX | Performed by: HOSPITALIST

## 2021-06-09 PROCEDURE — 85025 COMPLETE CBC W/AUTO DIFF WBC: CPT | Mod: NTX | Performed by: HOSPITALIST

## 2021-06-09 PROCEDURE — 84100 ASSAY OF PHOSPHORUS: CPT | Mod: NTX | Performed by: HOSPITALIST

## 2021-06-09 RX ORDER — SODIUM,POTASSIUM PHOSPHATES 280-250MG
2 POWDER IN PACKET (EA) ORAL ONCE
Status: COMPLETED | OUTPATIENT
Start: 2021-06-09 | End: 2021-06-09

## 2021-06-09 RX ORDER — OMEGA-3-ACID ETHYL ESTERS 1 G/1
1 CAPSULE, LIQUID FILLED ORAL DAILY
Status: DISCONTINUED | OUTPATIENT
Start: 2021-06-09 | End: 2021-06-09

## 2021-06-09 RX ORDER — IBUPROFEN 200 MG
24 TABLET ORAL
Status: DISCONTINUED | OUTPATIENT
Start: 2021-06-09 | End: 2021-06-09 | Stop reason: HOSPADM

## 2021-06-09 RX ORDER — SERTRALINE HYDROCHLORIDE 25 MG/1
25 TABLET, FILM COATED ORAL DAILY
Status: DISCONTINUED | OUTPATIENT
Start: 2021-06-09 | End: 2021-06-09 | Stop reason: HOSPADM

## 2021-06-09 RX ORDER — INSULIN ASPART 100 [IU]/ML
0-5 INJECTION, SOLUTION INTRAVENOUS; SUBCUTANEOUS
Status: DISCONTINUED | OUTPATIENT
Start: 2021-06-09 | End: 2021-06-09 | Stop reason: HOSPADM

## 2021-06-09 RX ORDER — GLUCAGON 1 MG
1 KIT INJECTION
Status: DISCONTINUED | OUTPATIENT
Start: 2021-06-09 | End: 2021-06-09 | Stop reason: HOSPADM

## 2021-06-09 RX ORDER — MAGNESIUM SULFATE HEPTAHYDRATE 40 MG/ML
2 INJECTION, SOLUTION INTRAVENOUS ONCE
Status: COMPLETED | OUTPATIENT
Start: 2021-06-09 | End: 2021-06-09

## 2021-06-09 RX ORDER — OMEGA-3/DHA/EPA/FISH OIL 300-1000MG
1 CAPSULE,DELAYED RELEASE (ENTERIC COATED) ORAL DAILY
Status: DISCONTINUED | OUTPATIENT
Start: 2021-06-09 | End: 2021-06-09

## 2021-06-09 RX ORDER — TALC
6 POWDER (GRAM) TOPICAL NIGHTLY PRN
Status: DISCONTINUED | OUTPATIENT
Start: 2021-06-09 | End: 2021-06-09 | Stop reason: HOSPADM

## 2021-06-09 RX ORDER — INSULIN GLARGINE 100 [IU]/ML
25 INJECTION, SOLUTION SUBCUTANEOUS NIGHTLY
Qty: 1 BOX | Refills: 1 | Status: SHIPPED | OUTPATIENT
Start: 2021-06-09 | End: 2021-11-17 | Stop reason: SDUPTHER

## 2021-06-09 RX ORDER — IBUPROFEN 200 MG
16 TABLET ORAL
Status: DISCONTINUED | OUTPATIENT
Start: 2021-06-09 | End: 2021-06-09 | Stop reason: HOSPADM

## 2021-06-09 RX ADMIN — PANTOPRAZOLE SODIUM 20 MG: 20 TABLET, DELAYED RELEASE ORAL at 09:06

## 2021-06-09 RX ADMIN — THERA TABS 1 TABLET: TAB at 09:06

## 2021-06-09 RX ADMIN — POTASSIUM & SODIUM PHOSPHATES POWDER PACK 280-160-250 MG 2 PACKET: 280-160-250 PACK at 09:06

## 2021-06-09 RX ADMIN — MAGNESIUM SULFATE HEPTAHYDRATE 2 G: 40 INJECTION, SOLUTION INTRAVENOUS at 09:06

## 2021-06-09 RX ADMIN — APIXABAN 5 MG: 5 TABLET, FILM COATED ORAL at 08:06

## 2021-06-09 RX ADMIN — APIXABAN 5 MG: 5 TABLET, FILM COATED ORAL at 09:06

## 2021-06-09 RX ADMIN — RIFAXIMIN 550 MG: 550 TABLET ORAL at 09:06

## 2021-06-09 RX ADMIN — SERTRALINE HYDROCHLORIDE 25 MG: 25 TABLET ORAL at 11:06

## 2021-06-09 RX ADMIN — RIFAXIMIN 550 MG: 550 TABLET ORAL at 08:06

## 2021-06-09 RX ADMIN — METOPROLOL SUCCINATE 50 MG: 50 TABLET, EXTENDED RELEASE ORAL at 09:06

## 2021-06-11 ENCOUNTER — PATIENT OUTREACH (OUTPATIENT)
Dept: ADMINISTRATIVE | Facility: CLINIC | Age: 66
End: 2021-06-11

## 2021-06-11 ENCOUNTER — TELEPHONE (OUTPATIENT)
Dept: RESEARCH | Facility: HOSPITAL | Age: 66
End: 2021-06-11

## 2021-06-14 ENCOUNTER — CLINICAL SUPPORT (OUTPATIENT)
Dept: INFECTIOUS DISEASES | Facility: CLINIC | Age: 66
End: 2021-06-14
Payer: MEDICARE

## 2021-06-14 DIAGNOSIS — K75.81 NASH (NONALCOHOLIC STEATOHEPATITIS): ICD-10-CM

## 2021-06-14 DIAGNOSIS — K76.6 PORTAL HYPERTENSION: ICD-10-CM

## 2021-06-14 DIAGNOSIS — K74.60 HEPATIC CIRRHOSIS, UNSPECIFIED HEPATIC CIRRHOSIS TYPE, UNSPECIFIED WHETHER ASCITES PRESENT: ICD-10-CM

## 2021-06-14 DIAGNOSIS — Z01.818 PRE-TRANSPLANT EVALUATION FOR LIVER TRANSPLANT: ICD-10-CM

## 2021-06-14 DIAGNOSIS — Z71.85 VACCINE COUNSELING: ICD-10-CM

## 2021-06-14 DIAGNOSIS — C22.0 HEPATOCELLULAR CARCINOMA: ICD-10-CM

## 2021-06-14 DIAGNOSIS — E11.40 TYPE 2 DIABETES MELLITUS WITH DIABETIC NEUROPATHY, UNSPECIFIED WHETHER LONG TERM INSULIN USE: ICD-10-CM

## 2021-06-14 PROCEDURE — G0009 HEPATITIS B (RECOMBINANT) ADJUVANTED, 2 DOSE: ICD-10-PCS | Mod: S$GLB,TXP,, | Performed by: INTERNAL MEDICINE

## 2021-06-14 PROCEDURE — G0010 ADMIN HEPATITIS B VACCINE: HCPCS | Mod: S$GLB,TXP,, | Performed by: INTERNAL MEDICINE

## 2021-06-14 PROCEDURE — 90750 HZV VACC RECOMBINANT IM: CPT | Mod: S$GLB,TXP,, | Performed by: INTERNAL MEDICINE

## 2021-06-14 PROCEDURE — 90732 PPSV23 VACC 2 YRS+ SUBQ/IM: CPT | Mod: S$GLB,TXP,, | Performed by: INTERNAL MEDICINE

## 2021-06-14 PROCEDURE — 90472 ZOSTER RECOMBINANT VACCINE: ICD-10-PCS | Mod: S$GLB,TXP,, | Performed by: INTERNAL MEDICINE

## 2021-06-14 PROCEDURE — G0010 PNEUMOCOCCAL POLYSACCHARIDE VACCINE 23-VALENT =>2YO SQ IM: ICD-10-PCS | Mod: S$GLB,TXP,, | Performed by: INTERNAL MEDICINE

## 2021-06-14 PROCEDURE — 99999 PR PBB SHADOW E&M-EST. PATIENT-LVL III: CPT | Mod: PBBFAC,TXP,,

## 2021-06-14 PROCEDURE — 90739 HEPB VACC 2/4 DOSE ADULT IM: CPT | Mod: S$GLB,TXP,, | Performed by: INTERNAL MEDICINE

## 2021-06-14 PROCEDURE — G0009 ADMIN PNEUMOCOCCAL VACCINE: HCPCS | Mod: S$GLB,TXP,, | Performed by: INTERNAL MEDICINE

## 2021-06-14 PROCEDURE — 90750 ZOSTER RECOMBINANT VACCINE: ICD-10-PCS | Mod: S$GLB,TXP,, | Performed by: INTERNAL MEDICINE

## 2021-06-14 PROCEDURE — 90732 PNEUMOCOCCAL POLYSACCHARIDE VACCINE 23-VALENT =>2YO SQ IM: ICD-10-PCS | Mod: S$GLB,TXP,, | Performed by: INTERNAL MEDICINE

## 2021-06-14 PROCEDURE — 90739 HEPATITIS B (RECOMBINANT) ADJUVANTED, 2 DOSE: ICD-10-PCS | Mod: S$GLB,TXP,, | Performed by: INTERNAL MEDICINE

## 2021-06-14 PROCEDURE — 99999 PR PBB SHADOW E&M-EST. PATIENT-LVL III: ICD-10-PCS | Mod: PBBFAC,TXP,,

## 2021-06-14 PROCEDURE — 90472 IMMUNIZATION ADMIN EACH ADD: CPT | Mod: S$GLB,TXP,, | Performed by: INTERNAL MEDICINE

## 2021-06-18 ENCOUNTER — OFFICE VISIT (OUTPATIENT)
Dept: ELECTROPHYSIOLOGY | Facility: CLINIC | Age: 66
End: 2021-06-18
Payer: MEDICARE

## 2021-06-18 ENCOUNTER — HOSPITAL ENCOUNTER (OUTPATIENT)
Dept: CARDIOLOGY | Facility: CLINIC | Age: 66
Discharge: HOME | End: 2021-06-18
Payer: MEDICARE

## 2021-06-18 VITALS
SYSTOLIC BLOOD PRESSURE: 120 MMHG | HEIGHT: 72 IN | WEIGHT: 221.31 LBS | BODY MASS INDEX: 29.97 KG/M2 | HEART RATE: 99 BPM | DIASTOLIC BLOOD PRESSURE: 55 MMHG

## 2021-06-18 DIAGNOSIS — Z79.01 CURRENT USE OF ANTICOAGULANT THERAPY: ICD-10-CM

## 2021-06-18 DIAGNOSIS — I48.19 PERSISTENT ATRIAL FIBRILLATION: Primary | ICD-10-CM

## 2021-06-18 DIAGNOSIS — Z92.89 HISTORY OF CARDIOVERSION: ICD-10-CM

## 2021-06-18 DIAGNOSIS — I10 ESSENTIAL HYPERTENSION: ICD-10-CM

## 2021-06-18 DIAGNOSIS — N17.9 AKI (ACUTE KIDNEY INJURY): ICD-10-CM

## 2021-06-18 DIAGNOSIS — I48.11 LONGSTANDING PERSISTENT ATRIAL FIBRILLATION: ICD-10-CM

## 2021-06-18 PROCEDURE — 3288F PR FALLS RISK ASSESSMENT DOCUMENTED: ICD-10-PCS | Mod: CPTII,NTX,S$GLB, | Performed by: NURSE PRACTITIONER

## 2021-06-18 PROCEDURE — 99214 OFFICE O/P EST MOD 30 MIN: CPT | Mod: NTX,S$GLB,, | Performed by: NURSE PRACTITIONER

## 2021-06-18 PROCEDURE — 3288F FALL RISK ASSESSMENT DOCD: CPT | Mod: CPTII,NTX,S$GLB, | Performed by: NURSE PRACTITIONER

## 2021-06-18 PROCEDURE — 1159F MED LIST DOCD IN RCRD: CPT | Mod: NTX,S$GLB,, | Performed by: NURSE PRACTITIONER

## 2021-06-18 PROCEDURE — 3008F BODY MASS INDEX DOCD: CPT | Mod: CPTII,NTX,S$GLB, | Performed by: NURSE PRACTITIONER

## 2021-06-18 PROCEDURE — 93010 RHYTHM STRIP: ICD-10-PCS | Mod: NTX,S$GLB,, | Performed by: INTERNAL MEDICINE

## 2021-06-18 PROCEDURE — 93005 ELECTROCARDIOGRAM TRACING: CPT | Mod: NTX,S$GLB,, | Performed by: INTERNAL MEDICINE

## 2021-06-18 PROCEDURE — 1101F PR PT FALLS ASSESS DOC 0-1 FALLS W/OUT INJ PAST YR: ICD-10-PCS | Mod: CPTII,NTX,S$GLB, | Performed by: NURSE PRACTITIONER

## 2021-06-18 PROCEDURE — 93010 ELECTROCARDIOGRAM REPORT: CPT | Mod: NTX,S$GLB,, | Performed by: INTERNAL MEDICINE

## 2021-06-18 PROCEDURE — 1111F PR DISCHARGE MEDS RECONCILED W/ CURRENT OUTPATIENT MED LIST: ICD-10-PCS | Mod: CPTII,NTX,S$GLB, | Performed by: NURSE PRACTITIONER

## 2021-06-18 PROCEDURE — 1126F PR PAIN SEVERITY QUANTIFIED, NO PAIN PRESENT: ICD-10-PCS | Mod: NTX,S$GLB,, | Performed by: NURSE PRACTITIONER

## 2021-06-18 PROCEDURE — 99999 PR PBB SHADOW E&M-EST. PATIENT-LVL IV: ICD-10-PCS | Mod: PBBFAC,TXP,, | Performed by: NURSE PRACTITIONER

## 2021-06-18 PROCEDURE — 1111F DSCHRG MED/CURRENT MED MERGE: CPT | Mod: CPTII,NTX,S$GLB, | Performed by: NURSE PRACTITIONER

## 2021-06-18 PROCEDURE — 3008F PR BODY MASS INDEX (BMI) DOCUMENTED: ICD-10-PCS | Mod: CPTII,NTX,S$GLB, | Performed by: NURSE PRACTITIONER

## 2021-06-18 PROCEDURE — 99999 PR PBB SHADOW E&M-EST. PATIENT-LVL IV: CPT | Mod: PBBFAC,TXP,, | Performed by: NURSE PRACTITIONER

## 2021-06-18 PROCEDURE — 99214 PR OFFICE/OUTPT VISIT, EST, LEVL IV, 30-39 MIN: ICD-10-PCS | Mod: NTX,S$GLB,, | Performed by: NURSE PRACTITIONER

## 2021-06-18 PROCEDURE — 1126F AMNT PAIN NOTED NONE PRSNT: CPT | Mod: NTX,S$GLB,, | Performed by: NURSE PRACTITIONER

## 2021-06-18 PROCEDURE — 1159F PR MEDICATION LIST DOCUMENTED IN MEDICAL RECORD: ICD-10-PCS | Mod: NTX,S$GLB,, | Performed by: NURSE PRACTITIONER

## 2021-06-18 PROCEDURE — 1101F PT FALLS ASSESS-DOCD LE1/YR: CPT | Mod: CPTII,NTX,S$GLB, | Performed by: NURSE PRACTITIONER

## 2021-06-18 PROCEDURE — 93005 RHYTHM STRIP: ICD-10-PCS | Mod: NTX,S$GLB,, | Performed by: INTERNAL MEDICINE

## 2021-06-23 ENCOUNTER — PATIENT MESSAGE (OUTPATIENT)
Dept: ELECTROPHYSIOLOGY | Facility: CLINIC | Age: 66
End: 2021-06-23

## 2021-06-23 ENCOUNTER — TELEPHONE (OUTPATIENT)
Dept: ENDOSCOPY | Facility: HOSPITAL | Age: 66
End: 2021-06-23

## 2021-06-24 ENCOUNTER — PATIENT MESSAGE (OUTPATIENT)
Dept: ELECTROPHYSIOLOGY | Facility: CLINIC | Age: 66
End: 2021-06-24

## 2021-06-28 ENCOUNTER — PATIENT MESSAGE (OUTPATIENT)
Dept: ENDOSCOPY | Facility: HOSPITAL | Age: 66
End: 2021-06-28

## 2021-06-28 ENCOUNTER — TELEPHONE (OUTPATIENT)
Dept: ENDOSCOPY | Facility: HOSPITAL | Age: 66
End: 2021-06-28

## 2021-06-28 DIAGNOSIS — I48.19 PERSISTENT ATRIAL FIBRILLATION: Primary | ICD-10-CM

## 2021-06-28 DIAGNOSIS — K74.60 HEPATIC CIRRHOSIS, UNSPECIFIED HEPATIC CIRRHOSIS TYPE, UNSPECIFIED WHETHER ASCITES PRESENT: Primary | ICD-10-CM

## 2021-06-29 ENCOUNTER — PATIENT MESSAGE (OUTPATIENT)
Dept: ELECTROPHYSIOLOGY | Facility: CLINIC | Age: 66
End: 2021-06-29

## 2021-06-29 ENCOUNTER — TELEPHONE (OUTPATIENT)
Dept: ELECTROPHYSIOLOGY | Facility: CLINIC | Age: 66
End: 2021-06-29

## 2021-06-29 ENCOUNTER — LAB VISIT (OUTPATIENT)
Dept: LAB | Facility: OTHER | Age: 66
End: 2021-06-29
Attending: INTERNAL MEDICINE
Payer: MEDICARE

## 2021-06-29 DIAGNOSIS — E11.9 TYPE 2 DIABETES MELLITUS WITHOUT COMPLICATION, WITH LONG-TERM CURRENT USE OF INSULIN: ICD-10-CM

## 2021-06-29 DIAGNOSIS — I48.19 PERSISTENT ATRIAL FIBRILLATION: Primary | ICD-10-CM

## 2021-06-29 DIAGNOSIS — Z79.4 TYPE 2 DIABETES MELLITUS WITHOUT COMPLICATION, WITH LONG-TERM CURRENT USE OF INSULIN: ICD-10-CM

## 2021-06-29 DIAGNOSIS — R20.8 OTHER DISTURBANCES OF SKIN SENSATION (CODE): ICD-10-CM

## 2021-06-29 DIAGNOSIS — I48.19 PERSISTENT ATRIAL FIBRILLATION: ICD-10-CM

## 2021-06-29 DIAGNOSIS — E11.42 DIABETIC POLYNEUROPATHY ASSOCIATED WITH TYPE 2 DIABETES MELLITUS: ICD-10-CM

## 2021-06-29 LAB
ANION GAP SERPL CALC-SCNC: 11 MMOL/L (ref 8–16)
APTT BLDCRRT: 30.5 SEC (ref 21–32)
BUN SERPL-MCNC: 21 MG/DL (ref 8–23)
CALCIUM SERPL-MCNC: 9.7 MG/DL (ref 8.7–10.5)
CHLORIDE SERPL-SCNC: 103 MMOL/L (ref 95–110)
CHOLEST SERPL-MCNC: 133 MG/DL (ref 120–199)
CHOLEST/HDLC SERPL: 4.2 {RATIO} (ref 2–5)
CO2 SERPL-SCNC: 26 MMOL/L (ref 23–29)
CREAT SERPL-MCNC: 1 MG/DL (ref 0.5–1.4)
ERYTHROCYTE [DISTWIDTH] IN BLOOD BY AUTOMATED COUNT: 14.4 % (ref 11.5–14.5)
EST. GFR  (AFRICAN AMERICAN): >60 ML/MIN/1.73 M^2
EST. GFR  (NON AFRICAN AMERICAN): >60 ML/MIN/1.73 M^2
ESTIMATED AVG GLUCOSE: 160 MG/DL (ref 68–131)
GLUCOSE SERPL-MCNC: 121 MG/DL (ref 70–110)
HBA1C MFR BLD: 7.2 % (ref 4–5.6)
HCT VFR BLD AUTO: 47.8 % (ref 40–54)
HDLC SERPL-MCNC: 32 MG/DL (ref 40–75)
HDLC SERPL: 24.1 % (ref 20–50)
HGB BLD-MCNC: 15.4 G/DL (ref 14–18)
INR PPP: 1 (ref 0.8–1.2)
LDLC SERPL CALC-MCNC: 69.2 MG/DL (ref 63–159)
MCH RBC QN AUTO: 28.8 PG (ref 27–31)
MCHC RBC AUTO-ENTMCNC: 32.2 G/DL (ref 32–36)
MCV RBC AUTO: 90 FL (ref 82–98)
NONHDLC SERPL-MCNC: 101 MG/DL
PLATELET # BLD AUTO: 153 K/UL (ref 150–450)
PMV BLD AUTO: 11.4 FL (ref 9.2–12.9)
POTASSIUM SERPL-SCNC: 4.7 MMOL/L (ref 3.5–5.1)
PROTHROMBIN TIME: 11.4 SEC (ref 9–12.5)
RBC # BLD AUTO: 5.34 M/UL (ref 4.6–6.2)
SODIUM SERPL-SCNC: 140 MMOL/L (ref 136–145)
TRIGL SERPL-MCNC: 159 MG/DL (ref 30–150)
VIT B12 SERPL-MCNC: 291 PG/ML (ref 210–950)
WBC # BLD AUTO: 7.15 K/UL (ref 3.9–12.7)

## 2021-06-29 PROCEDURE — 85027 COMPLETE CBC AUTOMATED: CPT | Mod: NTX | Performed by: INTERNAL MEDICINE

## 2021-06-29 PROCEDURE — 82607 VITAMIN B-12: CPT | Mod: NTX | Performed by: INTERNAL MEDICINE

## 2021-06-29 PROCEDURE — 85610 PROTHROMBIN TIME: CPT | Mod: NTX | Performed by: INTERNAL MEDICINE

## 2021-06-29 PROCEDURE — 80048 BASIC METABOLIC PNL TOTAL CA: CPT | Mod: NTX | Performed by: INTERNAL MEDICINE

## 2021-06-29 PROCEDURE — 36415 COLL VENOUS BLD VENIPUNCTURE: CPT | Mod: NTX | Performed by: INTERNAL MEDICINE

## 2021-06-29 PROCEDURE — 83036 HEMOGLOBIN GLYCOSYLATED A1C: CPT | Mod: TXP | Performed by: INTERNAL MEDICINE

## 2021-06-29 PROCEDURE — 80061 LIPID PANEL: CPT | Mod: NTX | Performed by: INTERNAL MEDICINE

## 2021-06-29 PROCEDURE — 85730 THROMBOPLASTIN TIME PARTIAL: CPT | Mod: NTX | Performed by: INTERNAL MEDICINE

## 2021-06-30 ENCOUNTER — ANESTHESIA EVENT (OUTPATIENT)
Dept: ENDOSCOPY | Facility: HOSPITAL | Age: 66
End: 2021-06-30
Payer: MEDICARE

## 2021-07-01 ENCOUNTER — HOSPITAL ENCOUNTER (OUTPATIENT)
Facility: HOSPITAL | Age: 66
Discharge: HOME | End: 2021-07-01
Attending: INTERNAL MEDICINE | Admitting: INTERNAL MEDICINE
Payer: MEDICARE

## 2021-07-01 ENCOUNTER — ANESTHESIA (OUTPATIENT)
Dept: ENDOSCOPY | Facility: HOSPITAL | Age: 66
End: 2021-07-01
Payer: MEDICARE

## 2021-07-01 VITALS
WEIGHT: 220 LBS | HEIGHT: 72 IN | OXYGEN SATURATION: 95 % | HEART RATE: 85 BPM | RESPIRATION RATE: 16 BRPM | SYSTOLIC BLOOD PRESSURE: 110 MMHG | TEMPERATURE: 98 F | BODY MASS INDEX: 29.8 KG/M2 | DIASTOLIC BLOOD PRESSURE: 74 MMHG

## 2021-07-01 DIAGNOSIS — K74.69 OTHER CIRRHOSIS OF LIVER: Primary | ICD-10-CM

## 2021-07-01 LAB — POCT GLUCOSE: 157 MG/DL (ref 70–110)

## 2021-07-01 PROCEDURE — 43239 EGD BIOPSY SINGLE/MULTIPLE: CPT | Mod: TXP,,, | Performed by: INTERNAL MEDICINE

## 2021-07-01 PROCEDURE — 27201012 HC FORCEPS, HOT/COLD, DISP: Mod: TXP | Performed by: INTERNAL MEDICINE

## 2021-07-01 PROCEDURE — 63600175 PHARM REV CODE 636 W HCPCS: Mod: TXP | Performed by: NURSE ANESTHETIST, CERTIFIED REGISTERED

## 2021-07-01 PROCEDURE — E9220 PRA ENDO ANESTHESIA: HCPCS | Mod: TXP,,, | Performed by: NURSE ANESTHETIST, CERTIFIED REGISTERED

## 2021-07-01 PROCEDURE — 37000008 HC ANESTHESIA 1ST 15 MINUTES: Mod: TXP | Performed by: INTERNAL MEDICINE

## 2021-07-01 PROCEDURE — 88305 TISSUE EXAM BY PATHOLOGIST: CPT | Mod: 59,TXP | Performed by: PATHOLOGY

## 2021-07-01 PROCEDURE — 25000003 PHARM REV CODE 250: Mod: TXP | Performed by: NURSE ANESTHETIST, CERTIFIED REGISTERED

## 2021-07-01 PROCEDURE — 37000009 HC ANESTHESIA EA ADD 15 MINS: Mod: TXP | Performed by: INTERNAL MEDICINE

## 2021-07-01 PROCEDURE — 43239 PR EGD, FLEX, W/BIOPSY, SGL/MULTI: ICD-10-PCS | Mod: TXP,,, | Performed by: INTERNAL MEDICINE

## 2021-07-01 PROCEDURE — 88305 TISSUE EXAM BY PATHOLOGIST: CPT | Mod: 26,TXP,, | Performed by: PATHOLOGY

## 2021-07-01 PROCEDURE — 88305 TISSUE EXAM BY PATHOLOGIST: ICD-10-PCS | Mod: 26,TXP,, | Performed by: PATHOLOGY

## 2021-07-01 PROCEDURE — 25000003 PHARM REV CODE 250: Mod: TXP | Performed by: INTERNAL MEDICINE

## 2021-07-01 PROCEDURE — E9220 PRA ENDO ANESTHESIA: ICD-10-PCS | Mod: TXP,,, | Performed by: NURSE ANESTHETIST, CERTIFIED REGISTERED

## 2021-07-01 PROCEDURE — 43239 EGD BIOPSY SINGLE/MULTIPLE: CPT | Mod: TXP | Performed by: INTERNAL MEDICINE

## 2021-07-01 RX ORDER — PROPOFOL 10 MG/ML
VIAL (ML) INTRAVENOUS CONTINUOUS PRN
Status: DISCONTINUED | OUTPATIENT
Start: 2021-07-01 | End: 2021-07-01

## 2021-07-01 RX ORDER — PHENYLEPHRINE HYDROCHLORIDE 10 MG/ML
INJECTION INTRAVENOUS
Status: DISCONTINUED | OUTPATIENT
Start: 2021-07-01 | End: 2021-07-01

## 2021-07-01 RX ORDER — SODIUM CHLORIDE 9 MG/ML
INJECTION, SOLUTION INTRAVENOUS CONTINUOUS
Status: DISCONTINUED | OUTPATIENT
Start: 2021-07-01 | End: 2021-07-01 | Stop reason: HOSPADM

## 2021-07-01 RX ORDER — LIDOCAINE HYDROCHLORIDE 20 MG/ML
INJECTION INTRAVENOUS
Status: DISCONTINUED | OUTPATIENT
Start: 2021-07-01 | End: 2021-07-01

## 2021-07-01 RX ORDER — PROPOFOL 10 MG/ML
VIAL (ML) INTRAVENOUS
Status: DISCONTINUED | OUTPATIENT
Start: 2021-07-01 | End: 2021-07-01

## 2021-07-01 RX ADMIN — LIDOCAINE HYDROCHLORIDE 100 MG: 20 INJECTION, SOLUTION INTRAVENOUS at 08:07

## 2021-07-01 RX ADMIN — PROPOFOL 80 MG: 10 INJECTION, EMULSION INTRAVENOUS at 08:07

## 2021-07-01 RX ADMIN — SODIUM CHLORIDE: 0.9 INJECTION, SOLUTION INTRAVENOUS at 08:07

## 2021-07-01 RX ADMIN — PHENYLEPHRINE HYDROCHLORIDE 200 MCG: 10 INJECTION INTRAVENOUS at 08:07

## 2021-07-01 RX ADMIN — PHENYLEPHRINE HYDROCHLORIDE 300 MCG: 10 INJECTION INTRAVENOUS at 08:07

## 2021-07-01 RX ADMIN — Medication 175 MCG/KG/MIN: at 08:07

## 2021-07-06 ENCOUNTER — TELEPHONE (OUTPATIENT)
Dept: ELECTROPHYSIOLOGY | Facility: CLINIC | Age: 66
End: 2021-07-06

## 2021-07-07 ENCOUNTER — ANESTHESIA (OUTPATIENT)
Dept: MEDSURG UNIT | Facility: HOSPITAL | Age: 66
End: 2021-07-07
Payer: MEDICARE

## 2021-07-07 ENCOUNTER — ANESTHESIA EVENT (OUTPATIENT)
Dept: MEDSURG UNIT | Facility: HOSPITAL | Age: 66
End: 2021-07-07
Payer: MEDICARE

## 2021-07-07 ENCOUNTER — HOSPITAL ENCOUNTER (OUTPATIENT)
Facility: HOSPITAL | Age: 66
Discharge: HOME | End: 2021-07-07
Attending: INTERNAL MEDICINE | Admitting: INTERNAL MEDICINE
Payer: MEDICARE

## 2021-07-07 ENCOUNTER — HOSPITAL ENCOUNTER (OUTPATIENT)
Dept: CARDIOLOGY | Facility: HOSPITAL | Age: 66
Discharge: HOME | End: 2021-07-07
Attending: INTERNAL MEDICINE
Payer: MEDICARE

## 2021-07-07 VITALS
HEIGHT: 72 IN | HEART RATE: 84 BPM | SYSTOLIC BLOOD PRESSURE: 133 MMHG | RESPIRATION RATE: 15 BRPM | OXYGEN SATURATION: 98 % | WEIGHT: 220 LBS | BODY MASS INDEX: 29.8 KG/M2 | DIASTOLIC BLOOD PRESSURE: 76 MMHG | TEMPERATURE: 98 F

## 2021-07-07 VITALS
BODY MASS INDEX: 29.8 KG/M2 | WEIGHT: 220 LBS | HEIGHT: 72 IN | SYSTOLIC BLOOD PRESSURE: 146 MMHG | DIASTOLIC BLOOD PRESSURE: 78 MMHG

## 2021-07-07 DIAGNOSIS — I48.91 ATRIAL FIBRILLATION: ICD-10-CM

## 2021-07-07 DIAGNOSIS — I48.19 PERSISTENT ATRIAL FIBRILLATION: ICD-10-CM

## 2021-07-07 LAB
BSA FOR ECHO PROCEDURE: 2.25 M2
EJECTION FRACTION: 65 %
POCT GLUCOSE: 122 MG/DL (ref 70–110)
POCT GLUCOSE: 86 MG/DL (ref 70–110)

## 2021-07-07 PROCEDURE — D9220A PRA ANESTHESIA: Mod: CRNA,NTX,, | Performed by: NURSE ANESTHETIST, CERTIFIED REGISTERED

## 2021-07-07 PROCEDURE — D9220A PRA ANESTHESIA: Mod: ANES,NTX,, | Performed by: ANESTHESIOLOGY

## 2021-07-07 PROCEDURE — D9220A PRA ANESTHESIA: ICD-10-PCS | Mod: ANES,NTX,, | Performed by: ANESTHESIOLOGY

## 2021-07-07 PROCEDURE — 63600175 PHARM REV CODE 636 W HCPCS: Mod: TXP | Performed by: NURSE ANESTHETIST, CERTIFIED REGISTERED

## 2021-07-07 PROCEDURE — 93320 DOPPLER ECHO COMPLETE: CPT | Mod: TXP

## 2021-07-07 PROCEDURE — 93325 TRANSESOPHAGEAL ECHO (TEE) (CUPID ONLY): ICD-10-PCS | Mod: 26,NTX,, | Performed by: INTERNAL MEDICINE

## 2021-07-07 PROCEDURE — 93312 ECHO TRANSESOPHAGEAL: CPT | Mod: 26,NTX,, | Performed by: INTERNAL MEDICINE

## 2021-07-07 PROCEDURE — 82962 GLUCOSE BLOOD TEST: CPT | Mod: NTX | Performed by: INTERNAL MEDICINE

## 2021-07-07 PROCEDURE — 93325 DOPPLER ECHO COLOR FLOW MAPG: CPT | Mod: 26,NTX,, | Performed by: INTERNAL MEDICINE

## 2021-07-07 PROCEDURE — 92960 CARDIOVERSION ELECTRIC EXT: CPT | Mod: NTX,,, | Performed by: INTERNAL MEDICINE

## 2021-07-07 PROCEDURE — 93005 ELECTROCARDIOGRAM TRACING: CPT | Mod: TXP

## 2021-07-07 PROCEDURE — 93320 TRANSESOPHAGEAL ECHO (TEE) (CUPID ONLY): ICD-10-PCS | Mod: 26,NTX,, | Performed by: INTERNAL MEDICINE

## 2021-07-07 PROCEDURE — 37000008 HC ANESTHESIA 1ST 15 MINUTES: Mod: TXP | Performed by: INTERNAL MEDICINE

## 2021-07-07 PROCEDURE — 25000003 PHARM REV CODE 250: Mod: NTX | Performed by: NURSE ANESTHETIST, CERTIFIED REGISTERED

## 2021-07-07 PROCEDURE — 37000009 HC ANESTHESIA EA ADD 15 MINS: Mod: TXP | Performed by: INTERNAL MEDICINE

## 2021-07-07 PROCEDURE — 93320 DOPPLER ECHO COMPLETE: CPT | Mod: 26,NTX,, | Performed by: INTERNAL MEDICINE

## 2021-07-07 PROCEDURE — 92960 CARDIOVERSION ELECTRIC EXT: CPT | Mod: TXP | Performed by: INTERNAL MEDICINE

## 2021-07-07 PROCEDURE — 93312 TRANSESOPHAGEAL ECHO (TEE) (CUPID ONLY): ICD-10-PCS | Mod: 26,NTX,, | Performed by: INTERNAL MEDICINE

## 2021-07-07 PROCEDURE — 92960 PR CARDIOVERSION, ELECTIVE;EXTERN: ICD-10-PCS | Mod: NTX,,, | Performed by: INTERNAL MEDICINE

## 2021-07-07 PROCEDURE — 93010 ELECTROCARDIOGRAM REPORT: CPT | Mod: NTX,,, | Performed by: INTERNAL MEDICINE

## 2021-07-07 PROCEDURE — 93010 EKG 12-LEAD: ICD-10-PCS | Mod: 76,NTX,, | Performed by: INTERNAL MEDICINE

## 2021-07-07 PROCEDURE — D9220A PRA ANESTHESIA: ICD-10-PCS | Mod: CRNA,NTX,, | Performed by: NURSE ANESTHETIST, CERTIFIED REGISTERED

## 2021-07-07 RX ORDER — DIPHENHYDRAMINE HYDROCHLORIDE 50 MG/ML
25 INJECTION INTRAMUSCULAR; INTRAVENOUS EVERY 6 HOURS PRN
Status: DISCONTINUED | OUTPATIENT
Start: 2021-07-07 | End: 2021-07-07 | Stop reason: HOSPADM

## 2021-07-07 RX ORDER — SODIUM CHLORIDE 0.9 % (FLUSH) 0.9 %
3 SYRINGE (ML) INJECTION
Status: DISCONTINUED | OUTPATIENT
Start: 2021-07-07 | End: 2021-07-07 | Stop reason: HOSPADM

## 2021-07-07 RX ORDER — LIDOCAINE HYDROCHLORIDE 20 MG/ML
INJECTION INTRAVENOUS
Status: DISCONTINUED | OUTPATIENT
Start: 2021-07-07 | End: 2021-07-07

## 2021-07-07 RX ORDER — EPHEDRINE SULFATE 50 MG/ML
INJECTION, SOLUTION INTRAVENOUS
Status: DISCONTINUED | OUTPATIENT
Start: 2021-07-07 | End: 2021-07-07

## 2021-07-07 RX ORDER — PROPOFOL 10 MG/ML
VIAL (ML) INTRAVENOUS
Status: DISCONTINUED | OUTPATIENT
Start: 2021-07-07 | End: 2021-07-07

## 2021-07-07 RX ORDER — PROCHLORPERAZINE EDISYLATE 5 MG/ML
5 INJECTION INTRAMUSCULAR; INTRAVENOUS EVERY 30 MIN PRN
Status: DISCONTINUED | OUTPATIENT
Start: 2021-07-07 | End: 2021-07-07 | Stop reason: HOSPADM

## 2021-07-07 RX ORDER — LISINOPRIL 20 MG/1
20 TABLET ORAL DAILY
Status: ON HOLD | COMMUNITY
End: 2022-01-13 | Stop reason: HOSPADM

## 2021-07-07 RX ORDER — PHENYLEPHRINE HYDROCHLORIDE 10 MG/ML
INJECTION INTRAVENOUS
Status: DISCONTINUED | OUTPATIENT
Start: 2021-07-07 | End: 2021-07-07

## 2021-07-07 RX ORDER — FENTANYL CITRATE 50 UG/ML
25 INJECTION, SOLUTION INTRAMUSCULAR; INTRAVENOUS EVERY 5 MIN PRN
Status: DISCONTINUED | OUTPATIENT
Start: 2021-07-07 | End: 2021-07-07 | Stop reason: HOSPADM

## 2021-07-07 RX ORDER — AMIODARONE HYDROCHLORIDE 200 MG/1
TABLET ORAL
Qty: 146 TABLET | Refills: 0 | Status: SHIPPED | OUTPATIENT
Start: 2021-07-07 | End: 2021-11-14 | Stop reason: SDUPTHER

## 2021-07-07 RX ORDER — HYDROMORPHONE HYDROCHLORIDE 1 MG/ML
0.2 INJECTION, SOLUTION INTRAMUSCULAR; INTRAVENOUS; SUBCUTANEOUS EVERY 5 MIN PRN
Status: DISCONTINUED | OUTPATIENT
Start: 2021-07-07 | End: 2021-07-07 | Stop reason: HOSPADM

## 2021-07-07 RX ORDER — PROPOFOL 10 MG/ML
VIAL (ML) INTRAVENOUS CONTINUOUS PRN
Status: DISCONTINUED | OUTPATIENT
Start: 2021-07-07 | End: 2021-07-07

## 2021-07-07 RX ORDER — LIDOCAINE HYDROCHLORIDE 20 MG/ML
SOLUTION OROPHARYNGEAL
Status: DISCONTINUED | OUTPATIENT
Start: 2021-07-07 | End: 2021-07-07

## 2021-07-07 RX ADMIN — PHENYLEPHRINE HYDROCHLORIDE 200 MCG: 10 INJECTION, SOLUTION INTRAMUSCULAR; INTRAVENOUS; SUBCUTANEOUS at 09:07

## 2021-07-07 RX ADMIN — EPHEDRINE SULFATE 50 MG: 50 INJECTION, SOLUTION INTRAMUSCULAR; INTRAVENOUS; SUBCUTANEOUS at 10:07

## 2021-07-07 RX ADMIN — PHENYLEPHRINE HYDROCHLORIDE 100 MCG: 10 INJECTION, SOLUTION INTRAMUSCULAR; INTRAVENOUS; SUBCUTANEOUS at 09:07

## 2021-07-07 RX ADMIN — PROPOFOL 50 MG: 10 INJECTION, EMULSION INTRAVENOUS at 09:07

## 2021-07-07 RX ADMIN — Medication 175 MCG/KG/MIN: at 09:07

## 2021-07-07 RX ADMIN — GLYCOPYRROLATE 0.2 MG: 0.2 INJECTION, SOLUTION INTRAMUSCULAR; INTRAVITREAL at 09:07

## 2021-07-07 RX ADMIN — SODIUM CHLORIDE: 0.9 INJECTION, SOLUTION INTRAVENOUS at 09:07

## 2021-07-07 RX ADMIN — LIDOCAINE HYDROCHLORIDE 10 ML: 20 SOLUTION ORAL; TOPICAL at 09:07

## 2021-07-07 RX ADMIN — LIDOCAINE HYDROCHLORIDE 25 MG: 20 INJECTION, SOLUTION INTRAVENOUS at 09:07

## 2021-07-09 ENCOUNTER — TELEPHONE (OUTPATIENT)
Dept: ENDOCRINOLOGY | Facility: CLINIC | Age: 66
End: 2021-07-09

## 2021-07-09 DIAGNOSIS — E11.9 TYPE 2 DIABETES MELLITUS WITHOUT COMPLICATION, WITH LONG-TERM CURRENT USE OF INSULIN: ICD-10-CM

## 2021-07-09 DIAGNOSIS — Z79.4 TYPE 2 DIABETES MELLITUS WITHOUT COMPLICATION, WITH LONG-TERM CURRENT USE OF INSULIN: ICD-10-CM

## 2021-07-09 RX ORDER — LANCETS
EACH MISCELLANEOUS
Qty: 100 EACH | Refills: 11 | Status: SHIPPED | OUTPATIENT
Start: 2021-07-09 | End: 2022-02-15 | Stop reason: SDUPTHER

## 2021-07-12 ENCOUNTER — TELEPHONE (OUTPATIENT)
Dept: CARDIOLOGY | Facility: CLINIC | Age: 66
End: 2021-07-12

## 2021-07-13 ENCOUNTER — PATIENT MESSAGE (OUTPATIENT)
Dept: GASTROENTEROLOGY | Facility: CLINIC | Age: 66
End: 2021-07-13

## 2021-07-13 LAB
FINAL PATHOLOGIC DIAGNOSIS: NORMAL
GROSS: NORMAL
Lab: NORMAL

## 2021-07-14 ENCOUNTER — TELEPHONE (OUTPATIENT)
Dept: GASTROENTEROLOGY | Facility: CLINIC | Age: 66
End: 2021-07-14

## 2021-07-14 ENCOUNTER — PATIENT MESSAGE (OUTPATIENT)
Dept: GASTROENTEROLOGY | Facility: CLINIC | Age: 66
End: 2021-07-14

## 2021-07-15 ENCOUNTER — HOSPITAL ENCOUNTER (OUTPATIENT)
Dept: CARDIOLOGY | Facility: OTHER | Age: 66
Discharge: HOME | End: 2021-07-15
Payer: MEDICARE

## 2021-07-15 DIAGNOSIS — I48.19 PERSISTENT ATRIAL FIBRILLATION: ICD-10-CM

## 2021-07-15 PROCEDURE — 93005 ELECTROCARDIOGRAM TRACING: CPT | Mod: TXP

## 2021-07-15 PROCEDURE — 93010 ELECTROCARDIOGRAM REPORT: CPT | Mod: NTX,,, | Performed by: INTERNAL MEDICINE

## 2021-07-15 PROCEDURE — 93010 EKG 12-LEAD: ICD-10-PCS | Mod: NTX,,, | Performed by: INTERNAL MEDICINE

## 2021-07-16 ENCOUNTER — OFFICE VISIT (OUTPATIENT)
Dept: ENDOCRINOLOGY | Facility: CLINIC | Age: 66
End: 2021-07-16
Payer: MEDICARE

## 2021-07-16 VITALS
WEIGHT: 220 LBS | BODY MASS INDEX: 29.8 KG/M2 | HEIGHT: 72 IN | HEART RATE: 41 BPM | SYSTOLIC BLOOD PRESSURE: 160 MMHG | DIASTOLIC BLOOD PRESSURE: 70 MMHG

## 2021-07-16 DIAGNOSIS — E66.3 OVERWEIGHT (BMI 25.0-29.9): ICD-10-CM

## 2021-07-16 DIAGNOSIS — Z79.4 TYPE 2 DIABETES MELLITUS WITH HYPERGLYCEMIA, WITH LONG-TERM CURRENT USE OF INSULIN: ICD-10-CM

## 2021-07-16 DIAGNOSIS — Z79.4 TYPE 2 DIABETES MELLITUS WITHOUT COMPLICATION, WITH LONG-TERM CURRENT USE OF INSULIN: ICD-10-CM

## 2021-07-16 DIAGNOSIS — E11.42 DIABETIC POLYNEUROPATHY ASSOCIATED WITH TYPE 2 DIABETES MELLITUS: ICD-10-CM

## 2021-07-16 DIAGNOSIS — I10 ESSENTIAL HYPERTENSION: ICD-10-CM

## 2021-07-16 DIAGNOSIS — E78.2 MIXED HYPERLIPIDEMIA: ICD-10-CM

## 2021-07-16 DIAGNOSIS — E11.9 TYPE 2 DIABETES MELLITUS WITHOUT COMPLICATION, WITH LONG-TERM CURRENT USE OF INSULIN: ICD-10-CM

## 2021-07-16 DIAGNOSIS — E11.65 TYPE 2 DIABETES MELLITUS WITH HYPERGLYCEMIA, WITH LONG-TERM CURRENT USE OF INSULIN: ICD-10-CM

## 2021-07-16 PROCEDURE — 3008F PR BODY MASS INDEX (BMI) DOCUMENTED: ICD-10-PCS | Mod: CPTII,NTX,S$GLB, | Performed by: INTERNAL MEDICINE

## 2021-07-16 PROCEDURE — 1101F PT FALLS ASSESS-DOCD LE1/YR: CPT | Mod: CPTII,NTX,S$GLB, | Performed by: INTERNAL MEDICINE

## 2021-07-16 PROCEDURE — 99214 OFFICE O/P EST MOD 30 MIN: CPT | Mod: NTX,S$GLB,, | Performed by: INTERNAL MEDICINE

## 2021-07-16 PROCEDURE — 3051F PR MOST RECENT HEMOGLOBIN A1C LEVEL 7.0 - < 8.0%: ICD-10-PCS | Mod: CPTII,NTX,S$GLB, | Performed by: INTERNAL MEDICINE

## 2021-07-16 PROCEDURE — 3051F HG A1C>EQUAL 7.0%<8.0%: CPT | Mod: CPTII,NTX,S$GLB, | Performed by: INTERNAL MEDICINE

## 2021-07-16 PROCEDURE — 3008F BODY MASS INDEX DOCD: CPT | Mod: CPTII,NTX,S$GLB, | Performed by: INTERNAL MEDICINE

## 2021-07-16 PROCEDURE — 99214 PR OFFICE/OUTPT VISIT, EST, LEVL IV, 30-39 MIN: ICD-10-PCS | Mod: NTX,S$GLB,, | Performed by: INTERNAL MEDICINE

## 2021-07-16 PROCEDURE — 3288F FALL RISK ASSESSMENT DOCD: CPT | Mod: CPTII,NTX,S$GLB, | Performed by: INTERNAL MEDICINE

## 2021-07-16 PROCEDURE — 3288F PR FALLS RISK ASSESSMENT DOCUMENTED: ICD-10-PCS | Mod: CPTII,NTX,S$GLB, | Performed by: INTERNAL MEDICINE

## 2021-07-16 PROCEDURE — 3078F PR MOST RECENT DIASTOLIC BLOOD PRESSURE < 80 MM HG: ICD-10-PCS | Mod: CPTII,NTX,S$GLB, | Performed by: INTERNAL MEDICINE

## 2021-07-16 PROCEDURE — 1126F AMNT PAIN NOTED NONE PRSNT: CPT | Mod: NTX,S$GLB,, | Performed by: INTERNAL MEDICINE

## 2021-07-16 PROCEDURE — 1101F PR PT FALLS ASSESS DOC 0-1 FALLS W/OUT INJ PAST YR: ICD-10-PCS | Mod: CPTII,NTX,S$GLB, | Performed by: INTERNAL MEDICINE

## 2021-07-16 PROCEDURE — 3077F PR MOST RECENT SYSTOLIC BLOOD PRESSURE >= 140 MM HG: ICD-10-PCS | Mod: CPTII,NTX,S$GLB, | Performed by: INTERNAL MEDICINE

## 2021-07-16 PROCEDURE — 1126F PR PAIN SEVERITY QUANTIFIED, NO PAIN PRESENT: ICD-10-PCS | Mod: NTX,S$GLB,, | Performed by: INTERNAL MEDICINE

## 2021-07-16 PROCEDURE — 3077F SYST BP >= 140 MM HG: CPT | Mod: CPTII,NTX,S$GLB, | Performed by: INTERNAL MEDICINE

## 2021-07-16 PROCEDURE — 3078F DIAST BP <80 MM HG: CPT | Mod: CPTII,NTX,S$GLB, | Performed by: INTERNAL MEDICINE

## 2021-07-16 RX ORDER — INSULIN PUMP SYRINGE, 3 ML
EACH MISCELLANEOUS
Qty: 1 EACH | Refills: 0 | Status: SHIPPED | OUTPATIENT
Start: 2021-07-16

## 2021-07-19 ENCOUNTER — PATIENT MESSAGE (OUTPATIENT)
Dept: ELECTROPHYSIOLOGY | Facility: CLINIC | Age: 66
End: 2021-07-19

## 2021-07-20 ENCOUNTER — PATIENT MESSAGE (OUTPATIENT)
Dept: ELECTROPHYSIOLOGY | Facility: CLINIC | Age: 66
End: 2021-07-20

## 2021-07-20 DIAGNOSIS — I48.19 PERSISTENT ATRIAL FIBRILLATION: Primary | ICD-10-CM

## 2021-07-26 ENCOUNTER — TELEPHONE (OUTPATIENT)
Dept: ELECTROPHYSIOLOGY | Facility: CLINIC | Age: 66
End: 2021-07-26

## 2021-07-27 ENCOUNTER — HOSPITAL ENCOUNTER (OUTPATIENT)
Facility: HOSPITAL | Age: 66
Discharge: HOME | End: 2021-07-27
Attending: INTERNAL MEDICINE | Admitting: INTERNAL MEDICINE
Payer: MEDICARE

## 2021-07-27 ENCOUNTER — ANESTHESIA EVENT (OUTPATIENT)
Dept: MEDSURG UNIT | Facility: HOSPITAL | Age: 66
End: 2021-07-27
Payer: MEDICARE

## 2021-07-27 ENCOUNTER — ANESTHESIA (OUTPATIENT)
Dept: MEDSURG UNIT | Facility: HOSPITAL | Age: 66
End: 2021-07-27
Payer: MEDICARE

## 2021-07-27 VITALS
RESPIRATION RATE: 20 BRPM | BODY MASS INDEX: 29.8 KG/M2 | OXYGEN SATURATION: 96 % | HEART RATE: 72 BPM | SYSTOLIC BLOOD PRESSURE: 141 MMHG | TEMPERATURE: 98 F | HEIGHT: 72 IN | DIASTOLIC BLOOD PRESSURE: 89 MMHG | WEIGHT: 220 LBS

## 2021-07-27 DIAGNOSIS — I48.19 PERSISTENT ATRIAL FIBRILLATION: ICD-10-CM

## 2021-07-27 DIAGNOSIS — I48.91 ATRIAL FIBRILLATION: ICD-10-CM

## 2021-07-27 LAB
ANION GAP SERPL CALC-SCNC: 9 MMOL/L (ref 8–16)
BUN SERPL-MCNC: 19 MG/DL (ref 8–23)
CALCIUM SERPL-MCNC: 9.6 MG/DL (ref 8.7–10.5)
CHLORIDE SERPL-SCNC: 106 MMOL/L (ref 95–110)
CO2 SERPL-SCNC: 24 MMOL/L (ref 23–29)
CREAT SERPL-MCNC: 0.9 MG/DL (ref 0.5–1.4)
EST. GFR  (AFRICAN AMERICAN): >60 ML/MIN/1.73 M^2
EST. GFR  (NON AFRICAN AMERICAN): >60 ML/MIN/1.73 M^2
GLUCOSE SERPL-MCNC: 102 MG/DL (ref 70–110)
POCT GLUCOSE: 105 MG/DL (ref 70–110)
POCT GLUCOSE: 99 MG/DL (ref 70–110)
POTASSIUM SERPL-SCNC: 4.3 MMOL/L (ref 3.5–5.1)
SODIUM SERPL-SCNC: 139 MMOL/L (ref 136–145)

## 2021-07-27 PROCEDURE — 37000008 HC ANESTHESIA 1ST 15 MINUTES: Mod: TXP | Performed by: INTERNAL MEDICINE

## 2021-07-27 PROCEDURE — 82962 GLUCOSE BLOOD TEST: CPT | Mod: TXP | Performed by: INTERNAL MEDICINE

## 2021-07-27 PROCEDURE — 93005 ELECTROCARDIOGRAM TRACING: CPT | Mod: NTX,59

## 2021-07-27 PROCEDURE — 63600175 PHARM REV CODE 636 W HCPCS: Mod: NTX | Performed by: NURSE ANESTHETIST, CERTIFIED REGISTERED

## 2021-07-27 PROCEDURE — 92960 PR CARDIOVERSION, ELECTIVE;EXTERN: ICD-10-PCS | Mod: NTX,,, | Performed by: INTERNAL MEDICINE

## 2021-07-27 PROCEDURE — 93010 ELECTROCARDIOGRAM REPORT: CPT | Mod: NTX,,, | Performed by: INTERNAL MEDICINE

## 2021-07-27 PROCEDURE — 93010 EKG 12-LEAD: ICD-10-PCS | Mod: NTX,,, | Performed by: INTERNAL MEDICINE

## 2021-07-27 PROCEDURE — 25000003 PHARM REV CODE 250: Mod: NTX | Performed by: NURSE ANESTHETIST, CERTIFIED REGISTERED

## 2021-07-27 PROCEDURE — 92960 CARDIOVERSION ELECTRIC EXT: CPT | Mod: NTX,,, | Performed by: INTERNAL MEDICINE

## 2021-07-27 PROCEDURE — 93005 ELECTROCARDIOGRAM TRACING: CPT | Mod: NTX

## 2021-07-27 PROCEDURE — D9220A PRA ANESTHESIA: ICD-10-PCS | Mod: CRNA,NTX,, | Performed by: NURSE ANESTHETIST, CERTIFIED REGISTERED

## 2021-07-27 PROCEDURE — D9220A PRA ANESTHESIA: Mod: ANES,NTX,, | Performed by: ANESTHESIOLOGY

## 2021-07-27 PROCEDURE — 80048 BASIC METABOLIC PNL TOTAL CA: CPT | Mod: NTX | Performed by: NURSE PRACTITIONER

## 2021-07-27 PROCEDURE — 37000009 HC ANESTHESIA EA ADD 15 MINS: Mod: NTX | Performed by: INTERNAL MEDICINE

## 2021-07-27 PROCEDURE — D9220A PRA ANESTHESIA: ICD-10-PCS | Mod: ANES,NTX,, | Performed by: ANESTHESIOLOGY

## 2021-07-27 PROCEDURE — 92960 CARDIOVERSION ELECTRIC EXT: CPT | Mod: TXP | Performed by: INTERNAL MEDICINE

## 2021-07-27 PROCEDURE — D9220A PRA ANESTHESIA: Mod: CRNA,NTX,, | Performed by: NURSE ANESTHETIST, CERTIFIED REGISTERED

## 2021-07-27 RX ORDER — SODIUM CHLORIDE 0.9 % (FLUSH) 0.9 %
10 SYRINGE (ML) INJECTION
Status: DISCONTINUED | OUTPATIENT
Start: 2021-07-27 | End: 2021-07-27 | Stop reason: HOSPADM

## 2021-07-27 RX ORDER — PHENYLEPHRINE HYDROCHLORIDE 10 MG/ML
INJECTION INTRAVENOUS
Status: DISCONTINUED | OUTPATIENT
Start: 2021-07-27 | End: 2021-07-27

## 2021-07-27 RX ORDER — LIDOCAINE HYDROCHLORIDE 20 MG/ML
INJECTION INTRAVENOUS
Status: DISCONTINUED | OUTPATIENT
Start: 2021-07-27 | End: 2021-07-27

## 2021-07-27 RX ORDER — PROPOFOL 10 MG/ML
VIAL (ML) INTRAVENOUS
Status: DISCONTINUED | OUTPATIENT
Start: 2021-07-27 | End: 2021-07-27

## 2021-07-27 RX ORDER — PROPOFOL 10 MG/ML
VIAL (ML) INTRAVENOUS CONTINUOUS PRN
Status: DISCONTINUED | OUTPATIENT
Start: 2021-07-27 | End: 2021-07-27

## 2021-07-27 RX ADMIN — LIDOCAINE HYDROCHLORIDE 80 MG: 20 INJECTION, SOLUTION INTRAVENOUS at 10:07

## 2021-07-27 RX ADMIN — SODIUM CHLORIDE: 9 INJECTION, SOLUTION INTRAVENOUS at 10:07

## 2021-07-27 RX ADMIN — Medication 100 MCG/KG/MIN: at 10:07

## 2021-07-27 RX ADMIN — PROPOFOL 50 MG: 10 INJECTION, EMULSION INTRAVENOUS at 10:07

## 2021-07-27 RX ADMIN — PHENYLEPHRINE HYDROCHLORIDE 200 MCG: 10 INJECTION, SOLUTION INTRAMUSCULAR; INTRAVENOUS; SUBCUTANEOUS at 10:07

## 2021-07-27 RX ADMIN — PHENYLEPHRINE HYDROCHLORIDE 100 MCG: 10 INJECTION, SOLUTION INTRAMUSCULAR; INTRAVENOUS; SUBCUTANEOUS at 10:07

## 2021-08-03 ENCOUNTER — HOSPITAL ENCOUNTER (OUTPATIENT)
Dept: CARDIOLOGY | Facility: OTHER | Age: 66
Discharge: HOME | End: 2021-08-03
Payer: MEDICARE

## 2021-08-03 DIAGNOSIS — I48.19 PERSISTENT ATRIAL FIBRILLATION: ICD-10-CM

## 2021-08-11 ENCOUNTER — HOSPITAL ENCOUNTER (OUTPATIENT)
Dept: RADIOLOGY | Facility: HOSPITAL | Age: 66
Discharge: HOME | End: 2021-08-11
Attending: INTERNAL MEDICINE
Payer: MEDICARE

## 2021-08-11 ENCOUNTER — OFFICE VISIT (OUTPATIENT)
Dept: TRANSPLANT | Facility: CLINIC | Age: 66
End: 2021-08-11
Payer: MEDICARE

## 2021-08-11 VITALS
TEMPERATURE: 98 F | SYSTOLIC BLOOD PRESSURE: 130 MMHG | BODY MASS INDEX: 31.69 KG/M2 | WEIGHT: 234 LBS | HEART RATE: 88 BPM | HEIGHT: 72 IN | DIASTOLIC BLOOD PRESSURE: 60 MMHG | OXYGEN SATURATION: 99 % | RESPIRATION RATE: 18 BRPM

## 2021-08-11 DIAGNOSIS — C24.9 MALIGNANT NEOPLASM OF BILIARY TRACT, UNSPECIFIED: ICD-10-CM

## 2021-08-11 DIAGNOSIS — R91.1 SOLITARY PULMONARY NODULE: ICD-10-CM

## 2021-08-11 DIAGNOSIS — G47.00 INSOMNIA, UNSPECIFIED TYPE: Primary | ICD-10-CM

## 2021-08-11 PROCEDURE — 71250 CT THORAX DX C-: CPT | Mod: 26,TXP,, | Performed by: RADIOLOGY

## 2021-08-11 PROCEDURE — 74160 CT ABDOMEN WITH CONTRAST: ICD-10-PCS | Mod: 26,TXP,, | Performed by: STUDENT IN AN ORGANIZED HEALTH CARE EDUCATION/TRAINING PROGRAM

## 2021-08-11 PROCEDURE — 3008F PR BODY MASS INDEX (BMI) DOCUMENTED: ICD-10-PCS | Mod: CPTII,S$GLB,TXP, | Performed by: INTERNAL MEDICINE

## 2021-08-11 PROCEDURE — 3008F BODY MASS INDEX DOCD: CPT | Mod: CPTII,S$GLB,TXP, | Performed by: INTERNAL MEDICINE

## 2021-08-11 PROCEDURE — 1160F PR REVIEW ALL MEDS BY PRESCRIBER/CLIN PHARMACIST DOCUMENTED: ICD-10-PCS | Mod: CPTII,S$GLB,TXP, | Performed by: INTERNAL MEDICINE

## 2021-08-11 PROCEDURE — 1159F PR MEDICATION LIST DOCUMENTED IN MEDICAL RECORD: ICD-10-PCS | Mod: CPTII,S$GLB,TXP, | Performed by: INTERNAL MEDICINE

## 2021-08-11 PROCEDURE — 99214 OFFICE O/P EST MOD 30 MIN: CPT | Mod: S$GLB,TXP,, | Performed by: INTERNAL MEDICINE

## 2021-08-11 PROCEDURE — 3051F PR MOST RECENT HEMOGLOBIN A1C LEVEL 7.0 - < 8.0%: ICD-10-PCS | Mod: CPTII,S$GLB,TXP, | Performed by: INTERNAL MEDICINE

## 2021-08-11 PROCEDURE — 1101F PT FALLS ASSESS-DOCD LE1/YR: CPT | Mod: CPTII,S$GLB,TXP, | Performed by: INTERNAL MEDICINE

## 2021-08-11 PROCEDURE — 1160F RVW MEDS BY RX/DR IN RCRD: CPT | Mod: CPTII,S$GLB,TXP, | Performed by: INTERNAL MEDICINE

## 2021-08-11 PROCEDURE — 3075F PR MOST RECENT SYSTOLIC BLOOD PRESS GE 130-139MM HG: ICD-10-PCS | Mod: CPTII,S$GLB,TXP, | Performed by: INTERNAL MEDICINE

## 2021-08-11 PROCEDURE — 1126F PR PAIN SEVERITY QUANTIFIED, NO PAIN PRESENT: ICD-10-PCS | Mod: CPTII,S$GLB,TXP, | Performed by: INTERNAL MEDICINE

## 2021-08-11 PROCEDURE — 74160 CT ABDOMEN W/CONTRAST: CPT | Mod: TC,TXP

## 2021-08-11 PROCEDURE — 1101F PR PT FALLS ASSESS DOC 0-1 FALLS W/OUT INJ PAST YR: ICD-10-PCS | Mod: CPTII,S$GLB,TXP, | Performed by: INTERNAL MEDICINE

## 2021-08-11 PROCEDURE — 3078F DIAST BP <80 MM HG: CPT | Mod: CPTII,S$GLB,TXP, | Performed by: INTERNAL MEDICINE

## 2021-08-11 PROCEDURE — 99214 PR OFFICE/OUTPT VISIT, EST, LEVL IV, 30-39 MIN: ICD-10-PCS | Mod: S$GLB,TXP,, | Performed by: INTERNAL MEDICINE

## 2021-08-11 PROCEDURE — 3051F HG A1C>EQUAL 7.0%<8.0%: CPT | Mod: CPTII,S$GLB,TXP, | Performed by: INTERNAL MEDICINE

## 2021-08-11 PROCEDURE — 99999 PR PBB SHADOW E&M-EST. PATIENT-LVL V: ICD-10-PCS | Mod: PBBFAC,TXP,, | Performed by: INTERNAL MEDICINE

## 2021-08-11 PROCEDURE — 1159F MED LIST DOCD IN RCRD: CPT | Mod: CPTII,S$GLB,TXP, | Performed by: INTERNAL MEDICINE

## 2021-08-11 PROCEDURE — 71250 CT CHEST WITHOUT CONTRAST: ICD-10-PCS | Mod: 26,TXP,, | Performed by: RADIOLOGY

## 2021-08-11 PROCEDURE — 71250 CT THORAX DX C-: CPT | Mod: TC,TXP

## 2021-08-11 PROCEDURE — 74160 CT ABDOMEN W/CONTRAST: CPT | Mod: 26,TXP,, | Performed by: STUDENT IN AN ORGANIZED HEALTH CARE EDUCATION/TRAINING PROGRAM

## 2021-08-11 PROCEDURE — 25500020 PHARM REV CODE 255: Mod: TXP | Performed by: INTERNAL MEDICINE

## 2021-08-11 PROCEDURE — 3078F PR MOST RECENT DIASTOLIC BLOOD PRESSURE < 80 MM HG: ICD-10-PCS | Mod: CPTII,S$GLB,TXP, | Performed by: INTERNAL MEDICINE

## 2021-08-11 PROCEDURE — 3288F FALL RISK ASSESSMENT DOCD: CPT | Mod: CPTII,S$GLB,TXP, | Performed by: INTERNAL MEDICINE

## 2021-08-11 PROCEDURE — 99999 PR PBB SHADOW E&M-EST. PATIENT-LVL V: CPT | Mod: PBBFAC,TXP,, | Performed by: INTERNAL MEDICINE

## 2021-08-11 PROCEDURE — 3288F PR FALLS RISK ASSESSMENT DOCUMENTED: ICD-10-PCS | Mod: CPTII,S$GLB,TXP, | Performed by: INTERNAL MEDICINE

## 2021-08-11 PROCEDURE — 1126F AMNT PAIN NOTED NONE PRSNT: CPT | Mod: CPTII,S$GLB,TXP, | Performed by: INTERNAL MEDICINE

## 2021-08-11 PROCEDURE — 3075F SYST BP GE 130 - 139MM HG: CPT | Mod: CPTII,S$GLB,TXP, | Performed by: INTERNAL MEDICINE

## 2021-08-11 RX ORDER — ZOLPIDEM TARTRATE 5 MG/1
5 TABLET ORAL NIGHTLY PRN
Qty: 30 TABLET | Refills: 0 | Status: SHIPPED | OUTPATIENT
Start: 2021-08-11 | End: 2021-11-11 | Stop reason: SDUPTHER

## 2021-08-11 RX ADMIN — IOHEXOL 100 ML: 350 INJECTION, SOLUTION INTRAVENOUS at 01:08

## 2021-08-12 ENCOUNTER — TELEPHONE (OUTPATIENT)
Dept: RESEARCH | Facility: HOSPITAL | Age: 66
End: 2021-08-12

## 2021-08-18 ENCOUNTER — ANTI-COAG VISIT (OUTPATIENT)
Dept: CARDIOLOGY | Facility: CLINIC | Age: 66
End: 2021-08-18

## 2021-08-18 ENCOUNTER — HOSPITAL ENCOUNTER (OUTPATIENT)
Dept: CARDIOLOGY | Facility: CLINIC | Age: 66
Discharge: HOME | End: 2021-08-18
Payer: MEDICARE

## 2021-08-18 ENCOUNTER — OFFICE VISIT (OUTPATIENT)
Dept: ELECTROPHYSIOLOGY | Facility: CLINIC | Age: 66
End: 2021-08-18
Payer: MEDICARE

## 2021-08-18 VITALS
HEART RATE: 52 BPM | WEIGHT: 226.19 LBS | DIASTOLIC BLOOD PRESSURE: 73 MMHG | SYSTOLIC BLOOD PRESSURE: 140 MMHG | BODY MASS INDEX: 30.64 KG/M2 | HEIGHT: 72 IN

## 2021-08-18 DIAGNOSIS — Z79.01 CURRENT USE OF ANTICOAGULANT THERAPY: ICD-10-CM

## 2021-08-18 DIAGNOSIS — I48.19 PERSISTENT ATRIAL FIBRILLATION: Primary | ICD-10-CM

## 2021-08-18 DIAGNOSIS — I48.11 LONGSTANDING PERSISTENT ATRIAL FIBRILLATION: ICD-10-CM

## 2021-08-18 DIAGNOSIS — I48.19 PERSISTENT ATRIAL FIBRILLATION: ICD-10-CM

## 2021-08-18 DIAGNOSIS — Z92.89 HISTORY OF CARDIOVERSION: ICD-10-CM

## 2021-08-18 DIAGNOSIS — Z01.818 PRE-TRANSPLANT EVALUATION FOR LIVER TRANSPLANT: ICD-10-CM

## 2021-08-18 DIAGNOSIS — I10 ESSENTIAL HYPERTENSION: ICD-10-CM

## 2021-08-18 DIAGNOSIS — Z79.01 CURRENT USE OF ANTICOAGULANT THERAPY: Primary | ICD-10-CM

## 2021-08-18 PROCEDURE — 3077F SYST BP >= 140 MM HG: CPT | Mod: CPTII,NTX,S$GLB, | Performed by: NURSE PRACTITIONER

## 2021-08-18 PROCEDURE — 3008F PR BODY MASS INDEX (BMI) DOCUMENTED: ICD-10-PCS | Mod: CPTII,NTX,S$GLB, | Performed by: NURSE PRACTITIONER

## 2021-08-18 PROCEDURE — 93005 ELECTROCARDIOGRAM TRACING: CPT | Mod: NTX,S$GLB,, | Performed by: INTERNAL MEDICINE

## 2021-08-18 PROCEDURE — 3008F BODY MASS INDEX DOCD: CPT | Mod: CPTII,NTX,S$GLB, | Performed by: NURSE PRACTITIONER

## 2021-08-18 PROCEDURE — 1101F PR PT FALLS ASSESS DOC 0-1 FALLS W/OUT INJ PAST YR: ICD-10-PCS | Mod: CPTII,NTX,S$GLB, | Performed by: NURSE PRACTITIONER

## 2021-08-18 PROCEDURE — 99999 PR PBB SHADOW E&M-EST. PATIENT-LVL IV: ICD-10-PCS | Mod: PBBFAC,TXP,, | Performed by: NURSE PRACTITIONER

## 2021-08-18 PROCEDURE — 3051F HG A1C>EQUAL 7.0%<8.0%: CPT | Mod: CPTII,NTX,S$GLB, | Performed by: NURSE PRACTITIONER

## 2021-08-18 PROCEDURE — 3288F PR FALLS RISK ASSESSMENT DOCUMENTED: ICD-10-PCS | Mod: CPTII,NTX,S$GLB, | Performed by: NURSE PRACTITIONER

## 2021-08-18 PROCEDURE — 1159F MED LIST DOCD IN RCRD: CPT | Mod: CPTII,NTX,S$GLB, | Performed by: NURSE PRACTITIONER

## 2021-08-18 PROCEDURE — 3288F FALL RISK ASSESSMENT DOCD: CPT | Mod: CPTII,NTX,S$GLB, | Performed by: NURSE PRACTITIONER

## 2021-08-18 PROCEDURE — 3078F DIAST BP <80 MM HG: CPT | Mod: CPTII,NTX,S$GLB, | Performed by: NURSE PRACTITIONER

## 2021-08-18 PROCEDURE — 3051F PR MOST RECENT HEMOGLOBIN A1C LEVEL 7.0 - < 8.0%: ICD-10-PCS | Mod: CPTII,NTX,S$GLB, | Performed by: NURSE PRACTITIONER

## 2021-08-18 PROCEDURE — 3077F PR MOST RECENT SYSTOLIC BLOOD PRESSURE >= 140 MM HG: ICD-10-PCS | Mod: CPTII,NTX,S$GLB, | Performed by: NURSE PRACTITIONER

## 2021-08-18 PROCEDURE — 1126F AMNT PAIN NOTED NONE PRSNT: CPT | Mod: CPTII,NTX,S$GLB, | Performed by: NURSE PRACTITIONER

## 2021-08-18 PROCEDURE — 93010 RHYTHM STRIP: ICD-10-PCS | Mod: NTX,S$GLB,, | Performed by: INTERNAL MEDICINE

## 2021-08-18 PROCEDURE — 1126F PR PAIN SEVERITY QUANTIFIED, NO PAIN PRESENT: ICD-10-PCS | Mod: CPTII,NTX,S$GLB, | Performed by: NURSE PRACTITIONER

## 2021-08-18 PROCEDURE — 99214 PR OFFICE/OUTPT VISIT, EST, LEVL IV, 30-39 MIN: ICD-10-PCS | Mod: NTX,S$GLB,, | Performed by: NURSE PRACTITIONER

## 2021-08-18 PROCEDURE — 99999 PR PBB SHADOW E&M-EST. PATIENT-LVL IV: CPT | Mod: PBBFAC,TXP,, | Performed by: NURSE PRACTITIONER

## 2021-08-18 PROCEDURE — 3078F PR MOST RECENT DIASTOLIC BLOOD PRESSURE < 80 MM HG: ICD-10-PCS | Mod: CPTII,NTX,S$GLB, | Performed by: NURSE PRACTITIONER

## 2021-08-18 PROCEDURE — 93010 ELECTROCARDIOGRAM REPORT: CPT | Mod: NTX,S$GLB,, | Performed by: INTERNAL MEDICINE

## 2021-08-18 PROCEDURE — 93005 RHYTHM STRIP: ICD-10-PCS | Mod: NTX,S$GLB,, | Performed by: INTERNAL MEDICINE

## 2021-08-18 PROCEDURE — 1101F PT FALLS ASSESS-DOCD LE1/YR: CPT | Mod: CPTII,NTX,S$GLB, | Performed by: NURSE PRACTITIONER

## 2021-08-18 PROCEDURE — 1159F PR MEDICATION LIST DOCUMENTED IN MEDICAL RECORD: ICD-10-PCS | Mod: CPTII,NTX,S$GLB, | Performed by: NURSE PRACTITIONER

## 2021-08-18 PROCEDURE — 99214 OFFICE O/P EST MOD 30 MIN: CPT | Mod: NTX,S$GLB,, | Performed by: NURSE PRACTITIONER

## 2021-08-18 RX ORDER — WARFARIN SODIUM 5 MG/1
5 TABLET ORAL DAILY
Qty: 30 TABLET | Refills: 11 | Status: SHIPPED | OUTPATIENT
Start: 2021-08-18 | End: 2021-11-16 | Stop reason: SDUPTHER

## 2021-08-19 ENCOUNTER — TELEPHONE (OUTPATIENT)
Dept: TRANSPLANT | Facility: CLINIC | Age: 66
End: 2021-08-19

## 2021-08-19 DIAGNOSIS — Z01.818 PRE-TRANSPLANT EVALUATION FOR LIVER TRANSPLANT: ICD-10-CM

## 2021-08-19 DIAGNOSIS — K75.81 NASH (NONALCOHOLIC STEATOHEPATITIS): ICD-10-CM

## 2021-08-19 DIAGNOSIS — C22.0 HCC (HEPATOCELLULAR CARCINOMA): Primary | ICD-10-CM

## 2021-08-20 ENCOUNTER — OFFICE VISIT (OUTPATIENT)
Dept: CARDIOLOGY | Facility: CLINIC | Age: 66
End: 2021-08-20
Payer: MEDICARE

## 2021-08-20 VITALS
WEIGHT: 227.75 LBS | SYSTOLIC BLOOD PRESSURE: 140 MMHG | HEIGHT: 72 IN | HEART RATE: 54 BPM | DIASTOLIC BLOOD PRESSURE: 80 MMHG | OXYGEN SATURATION: 92 % | BODY MASS INDEX: 30.85 KG/M2

## 2021-08-20 DIAGNOSIS — Z01.818 PRE-TRANSPLANT EVALUATION FOR LIVER TRANSPLANT: Primary | ICD-10-CM

## 2021-08-20 DIAGNOSIS — I73.9 PAD (PERIPHERAL ARTERY DISEASE): ICD-10-CM

## 2021-08-20 DIAGNOSIS — I35.0 NONRHEUMATIC AORTIC VALVE STENOSIS: ICD-10-CM

## 2021-08-20 DIAGNOSIS — I10 ESSENTIAL HYPERTENSION: ICD-10-CM

## 2021-08-20 DIAGNOSIS — Z87.891 FORMER SMOKER: ICD-10-CM

## 2021-08-20 DIAGNOSIS — I48.0 AF (PAROXYSMAL ATRIAL FIBRILLATION): ICD-10-CM

## 2021-08-20 DIAGNOSIS — E11.9 TYPE 2 DIABETES MELLITUS WITHOUT COMPLICATION, UNSPECIFIED WHETHER LONG TERM INSULIN USE: ICD-10-CM

## 2021-08-20 DIAGNOSIS — C22.0 HCC (HEPATOCELLULAR CARCINOMA): ICD-10-CM

## 2021-08-20 PROCEDURE — 3008F PR BODY MASS INDEX (BMI) DOCUMENTED: ICD-10-PCS | Mod: CPTII,S$GLB,TXP, | Performed by: INTERNAL MEDICINE

## 2021-08-20 PROCEDURE — 3051F HG A1C>EQUAL 7.0%<8.0%: CPT | Mod: CPTII,S$GLB,TXP, | Performed by: INTERNAL MEDICINE

## 2021-08-20 PROCEDURE — 3288F FALL RISK ASSESSMENT DOCD: CPT | Mod: CPTII,S$GLB,TXP, | Performed by: INTERNAL MEDICINE

## 2021-08-20 PROCEDURE — 1159F MED LIST DOCD IN RCRD: CPT | Mod: CPTII,S$GLB,TXP, | Performed by: INTERNAL MEDICINE

## 2021-08-20 PROCEDURE — 1159F PR MEDICATION LIST DOCUMENTED IN MEDICAL RECORD: ICD-10-PCS | Mod: CPTII,S$GLB,TXP, | Performed by: INTERNAL MEDICINE

## 2021-08-20 PROCEDURE — 1101F PR PT FALLS ASSESS DOC 0-1 FALLS W/OUT INJ PAST YR: ICD-10-PCS | Mod: CPTII,S$GLB,TXP, | Performed by: INTERNAL MEDICINE

## 2021-08-20 PROCEDURE — 3079F DIAST BP 80-89 MM HG: CPT | Mod: CPTII,S$GLB,TXP, | Performed by: INTERNAL MEDICINE

## 2021-08-20 PROCEDURE — 99999 PR PBB SHADOW E&M-EST. PATIENT-LVL V: CPT | Mod: PBBFAC,TXP,, | Performed by: INTERNAL MEDICINE

## 2021-08-20 PROCEDURE — 99214 OFFICE O/P EST MOD 30 MIN: CPT | Mod: S$GLB,TXP,, | Performed by: INTERNAL MEDICINE

## 2021-08-20 PROCEDURE — 1126F AMNT PAIN NOTED NONE PRSNT: CPT | Mod: CPTII,S$GLB,TXP, | Performed by: INTERNAL MEDICINE

## 2021-08-20 PROCEDURE — 99999 PR PBB SHADOW E&M-EST. PATIENT-LVL V: ICD-10-PCS | Mod: PBBFAC,TXP,, | Performed by: INTERNAL MEDICINE

## 2021-08-20 PROCEDURE — 3288F PR FALLS RISK ASSESSMENT DOCUMENTED: ICD-10-PCS | Mod: CPTII,S$GLB,TXP, | Performed by: INTERNAL MEDICINE

## 2021-08-20 PROCEDURE — 3051F PR MOST RECENT HEMOGLOBIN A1C LEVEL 7.0 - < 8.0%: ICD-10-PCS | Mod: CPTII,S$GLB,TXP, | Performed by: INTERNAL MEDICINE

## 2021-08-20 PROCEDURE — 99214 PR OFFICE/OUTPT VISIT, EST, LEVL IV, 30-39 MIN: ICD-10-PCS | Mod: S$GLB,TXP,, | Performed by: INTERNAL MEDICINE

## 2021-08-20 PROCEDURE — 3008F BODY MASS INDEX DOCD: CPT | Mod: CPTII,S$GLB,TXP, | Performed by: INTERNAL MEDICINE

## 2021-08-20 PROCEDURE — 3077F SYST BP >= 140 MM HG: CPT | Mod: CPTII,S$GLB,TXP, | Performed by: INTERNAL MEDICINE

## 2021-08-20 PROCEDURE — 1101F PT FALLS ASSESS-DOCD LE1/YR: CPT | Mod: CPTII,S$GLB,TXP, | Performed by: INTERNAL MEDICINE

## 2021-08-20 PROCEDURE — 3079F PR MOST RECENT DIASTOLIC BLOOD PRESSURE 80-89 MM HG: ICD-10-PCS | Mod: CPTII,S$GLB,TXP, | Performed by: INTERNAL MEDICINE

## 2021-08-20 PROCEDURE — 3077F PR MOST RECENT SYSTOLIC BLOOD PRESSURE >= 140 MM HG: ICD-10-PCS | Mod: CPTII,S$GLB,TXP, | Performed by: INTERNAL MEDICINE

## 2021-08-20 PROCEDURE — 1126F PR PAIN SEVERITY QUANTIFIED, NO PAIN PRESENT: ICD-10-PCS | Mod: CPTII,S$GLB,TXP, | Performed by: INTERNAL MEDICINE

## 2021-08-23 ENCOUNTER — ANTI-COAG VISIT (OUTPATIENT)
Dept: CARDIOLOGY | Facility: CLINIC | Age: 66
End: 2021-08-23
Payer: MEDICARE

## 2021-08-23 ENCOUNTER — TELEPHONE (OUTPATIENT)
Dept: TRANSPLANT | Facility: CLINIC | Age: 66
End: 2021-08-23

## 2021-08-23 ENCOUNTER — LAB VISIT (OUTPATIENT)
Dept: LAB | Facility: HOSPITAL | Age: 66
End: 2021-08-23
Attending: INTERNAL MEDICINE
Payer: MEDICARE

## 2021-08-23 DIAGNOSIS — C22.0 HCC (HEPATOCELLULAR CARCINOMA): Primary | ICD-10-CM

## 2021-08-23 DIAGNOSIS — K75.81 NASH (NONALCOHOLIC STEATOHEPATITIS): ICD-10-CM

## 2021-08-23 DIAGNOSIS — Z01.818 PRE-TRANSPLANT EVALUATION FOR LIVER TRANSPLANT: ICD-10-CM

## 2021-08-23 DIAGNOSIS — I48.19 PERSISTENT ATRIAL FIBRILLATION: ICD-10-CM

## 2021-08-23 DIAGNOSIS — I48.19 PERSISTENT ATRIAL FIBRILLATION: Primary | ICD-10-CM

## 2021-08-23 DIAGNOSIS — Z79.01 CURRENT USE OF ANTICOAGULANT THERAPY: ICD-10-CM

## 2021-08-23 LAB
INR PPP: 1.1 (ref 0.8–1.2)
PROTHROMBIN TIME: 12.2 SEC (ref 9–12.5)

## 2021-08-23 PROCEDURE — 93793 PR ANTICOAGULANT MGMT FOR PT TAKING WARFARIN: ICD-10-PCS | Mod: S$GLB,,,

## 2021-08-23 PROCEDURE — 85610 PROTHROMBIN TIME: CPT | Mod: NTX | Performed by: INTERNAL MEDICINE

## 2021-08-23 PROCEDURE — 93793 ANTICOAG MGMT PT WARFARIN: CPT | Mod: S$GLB,,,

## 2021-08-23 PROCEDURE — 36415 COLL VENOUS BLD VENIPUNCTURE: CPT | Mod: PN,TXP | Performed by: INTERNAL MEDICINE

## 2021-08-25 ENCOUNTER — PATIENT MESSAGE (OUTPATIENT)
Dept: TRANSPLANT | Facility: CLINIC | Age: 66
End: 2021-08-25

## 2021-08-25 ENCOUNTER — COMMITTEE REVIEW (OUTPATIENT)
Dept: TRANSPLANT | Facility: CLINIC | Age: 66
End: 2021-08-25

## 2021-08-25 ENCOUNTER — TELEPHONE (OUTPATIENT)
Dept: TRANSPLANT | Facility: CLINIC | Age: 66
End: 2021-08-25

## 2021-08-25 DIAGNOSIS — I35.0 AORTIC STENOSIS, MODERATE: ICD-10-CM

## 2021-08-25 DIAGNOSIS — C22.0 HCC (HEPATOCELLULAR CARCINOMA): Primary | ICD-10-CM

## 2021-08-25 DIAGNOSIS — K75.81 NASH (NONALCOHOLIC STEATOHEPATITIS): ICD-10-CM

## 2021-08-25 DIAGNOSIS — Z01.818 PRE-TRANSPLANT EVALUATION FOR LIVER TRANSPLANT: ICD-10-CM

## 2021-08-25 NOTE — COMMITTEE REVIEW
Tej Purdy's case presented to selection committee.  Patient has been deferred for liver transplant due ECHO on 7/7/21 showing moderate to severe aortic stenosis. Concern is if any evolution of aortic stenosis on recent [facility]. Dr. Maxwell will follow up with cardiology for any concerns or any additional testing needed.     I was present at the committee meeting and attest to the decision of the committee.    Shan Lopez  08/25/2021

## 2021-08-27 ENCOUNTER — ANTI-COAG VISIT (OUTPATIENT)
Dept: CARDIOLOGY | Facility: CLINIC | Age: 66
End: 2021-08-27
Payer: MEDICARE

## 2021-08-27 ENCOUNTER — LAB VISIT (OUTPATIENT)
Dept: LAB | Facility: HOSPITAL | Age: 66
End: 2021-08-27
Payer: MEDICARE

## 2021-08-27 DIAGNOSIS — Z01.818 PRE-TRANSPLANT EVALUATION FOR LIVER TRANSPLANT: ICD-10-CM

## 2021-08-27 DIAGNOSIS — E11.40 TYPE 2 DIABETES MELLITUS WITH DIABETIC NEUROPATHY, UNSPECIFIED WHETHER LONG TERM INSULIN USE: ICD-10-CM

## 2021-08-27 DIAGNOSIS — C22.0 HEPATOCELLULAR CARCINOMA: ICD-10-CM

## 2021-08-27 DIAGNOSIS — K75.81 NASH (NONALCOHOLIC STEATOHEPATITIS): ICD-10-CM

## 2021-08-27 DIAGNOSIS — I48.19 PERSISTENT ATRIAL FIBRILLATION: Primary | ICD-10-CM

## 2021-08-27 DIAGNOSIS — K74.60 HEPATIC CIRRHOSIS, UNSPECIFIED HEPATIC CIRRHOSIS TYPE, UNSPECIFIED WHETHER ASCITES PRESENT: ICD-10-CM

## 2021-08-27 DIAGNOSIS — Z79.01 CURRENT USE OF ANTICOAGULANT THERAPY: ICD-10-CM

## 2021-08-27 DIAGNOSIS — K76.6 PORTAL HYPERTENSION: ICD-10-CM

## 2021-08-27 DIAGNOSIS — C44.90 SKIN CANCER: ICD-10-CM

## 2021-08-27 LAB
INR PPP: 1.7 (ref 0.8–1.2)
PROTHROMBIN TIME: 18.1 SEC (ref 9–12.5)

## 2021-08-27 PROCEDURE — 85610 PROTHROMBIN TIME: CPT | Mod: TXP | Performed by: INTERNAL MEDICINE

## 2021-08-27 PROCEDURE — 93793 PR ANTICOAGULANT MGMT FOR PT TAKING WARFARIN: ICD-10-PCS | Mod: S$GLB,,,

## 2021-08-27 PROCEDURE — 36415 COLL VENOUS BLD VENIPUNCTURE: CPT | Mod: PN,TXP | Performed by: INTERNAL MEDICINE

## 2021-08-27 PROCEDURE — 93793 ANTICOAG MGMT PT WARFARIN: CPT | Mod: S$GLB,,,

## 2021-09-01 ENCOUNTER — PATIENT MESSAGE (OUTPATIENT)
Dept: HEPATOLOGY | Facility: CLINIC | Age: 66
End: 2021-09-01

## 2021-09-01 ENCOUNTER — PATIENT MESSAGE (OUTPATIENT)
Dept: TRANSPLANT | Facility: CLINIC | Age: 66
End: 2021-09-01

## 2021-09-01 ENCOUNTER — PATIENT MESSAGE (OUTPATIENT)
Dept: ELECTROPHYSIOLOGY | Facility: CLINIC | Age: 66
End: 2021-09-01

## 2021-09-02 ENCOUNTER — ANTI-COAG VISIT (OUTPATIENT)
Dept: CARDIOLOGY | Facility: CLINIC | Age: 66
End: 2021-09-02
Payer: MEDICARE

## 2021-09-02 ENCOUNTER — LAB VISIT (OUTPATIENT)
Dept: LAB | Facility: HOSPITAL | Age: 66
End: 2021-09-02
Attending: INTERNAL MEDICINE
Payer: MEDICARE

## 2021-09-02 ENCOUNTER — PATIENT MESSAGE (OUTPATIENT)
Dept: TRANSPLANT | Facility: CLINIC | Age: 66
End: 2021-09-02

## 2021-09-02 DIAGNOSIS — I48.19 PERSISTENT ATRIAL FIBRILLATION: ICD-10-CM

## 2021-09-02 DIAGNOSIS — Z01.818 PRE-TRANSPLANT EVALUATION FOR LIVER TRANSPLANT: ICD-10-CM

## 2021-09-02 DIAGNOSIS — I48.19 PERSISTENT ATRIAL FIBRILLATION: Primary | ICD-10-CM

## 2021-09-02 DIAGNOSIS — Z79.01 CURRENT USE OF ANTICOAGULANT THERAPY: ICD-10-CM

## 2021-09-02 LAB
INR PPP: 1.1 (ref 0.8–1.2)
PROTHROMBIN TIME: 12 SEC (ref 9–12.5)

## 2021-09-02 PROCEDURE — 36415 COLL VENOUS BLD VENIPUNCTURE: CPT | Mod: PN,NTX | Performed by: INTERNAL MEDICINE

## 2021-09-02 PROCEDURE — 85610 PROTHROMBIN TIME: CPT | Mod: NTX | Performed by: INTERNAL MEDICINE

## 2021-09-02 PROCEDURE — 93793 ANTICOAG MGMT PT WARFARIN: CPT | Mod: S$GLB,,,

## 2021-09-02 PROCEDURE — 93793 PR ANTICOAGULANT MGMT FOR PT TAKING WARFARIN: ICD-10-PCS | Mod: S$GLB,,,

## 2021-09-07 ENCOUNTER — PATIENT MESSAGE (OUTPATIENT)
Dept: HEPATOLOGY | Facility: CLINIC | Age: 66
End: 2021-09-07

## 2021-09-07 ENCOUNTER — HOSPITAL ENCOUNTER (OUTPATIENT)
Dept: CARDIOLOGY | Facility: HOSPITAL | Age: 66
Discharge: HOME | End: 2021-09-07
Attending: INTERNAL MEDICINE
Payer: MEDICARE

## 2021-09-07 VITALS
DIASTOLIC BLOOD PRESSURE: 62 MMHG | BODY MASS INDEX: 30.75 KG/M2 | WEIGHT: 227 LBS | HEART RATE: 57 BPM | HEIGHT: 72 IN | SYSTOLIC BLOOD PRESSURE: 136 MMHG

## 2021-09-07 DIAGNOSIS — C22.0 HCC (HEPATOCELLULAR CARCINOMA): ICD-10-CM

## 2021-09-07 DIAGNOSIS — Z01.818 PRE-TRANSPLANT EVALUATION FOR LIVER TRANSPLANT: ICD-10-CM

## 2021-09-07 DIAGNOSIS — I35.0 AORTIC STENOSIS, MODERATE: ICD-10-CM

## 2021-09-07 DIAGNOSIS — K75.81 NASH (NONALCOHOLIC STEATOHEPATITIS): ICD-10-CM

## 2021-09-07 LAB
ASCENDING AORTA: 3.35 CM
AV INDEX (PROSTH): 0.26
AV MEAN GRADIENT: 25 MMHG
AV PEAK GRADIENT: 40 MMHG
AV VALVE AREA: 1.1 CM2
AV VELOCITY RATIO: 0.26
BSA FOR ECHO PROCEDURE: 2.29 M2
CV ECHO LV RWT: 0.37 CM
DOP CALC AO PEAK VEL: 3.17 M/S
DOP CALC AO VTI: 79.43 CM
DOP CALC LVOT AREA: 4.3 CM2
DOP CALC LVOT DIAMETER: 2.33 CM
DOP CALC LVOT PEAK VEL: 0.83 M/S
DOP CALC LVOT STROKE VOLUME: 87.66 CM3
DOP CALCLVOT PEAK VEL VTI: 20.57 CM
E WAVE DECELERATION TIME: 290.96 MSEC
E/A RATIO: 0.72
E/E' RATIO: 8.91 M/S
ECHO LV POSTERIOR WALL: 1.01 CM (ref 0.6–1.1)
EJECTION FRACTION: 60 %
FRACTIONAL SHORTENING: 42 % (ref 28–44)
INTERVENTRICULAR SEPTUM: 1.13 CM (ref 0.6–1.1)
LA MAJOR: 5.13 CM
LA MINOR: 5.29 CM
LA WIDTH: 4.37 CM
LEFT ATRIUM SIZE: 3.62 CM
LEFT ATRIUM VOLUME INDEX MOD: 27.4 ML/M2
LEFT ATRIUM VOLUME INDEX: 31.1 ML/M2
LEFT ATRIUM VOLUME MOD: 61.73 CM3
LEFT ATRIUM VOLUME: 70.04 CM3
LEFT INTERNAL DIMENSION IN SYSTOLE: 3.2 CM (ref 2.1–4)
LEFT VENTRICLE DIASTOLIC VOLUME INDEX: 65.09 ML/M2
LEFT VENTRICLE DIASTOLIC VOLUME: 146.45 ML
LEFT VENTRICLE MASS INDEX: 103 G/M2
LEFT VENTRICLE SYSTOLIC VOLUME INDEX: 18.2 ML/M2
LEFT VENTRICLE SYSTOLIC VOLUME: 40.94 ML
LEFT VENTRICULAR INTERNAL DIMENSION IN DIASTOLE: 5.48 CM (ref 3.5–6)
LEFT VENTRICULAR MASS: 231.8 G
LV LATERAL E/E' RATIO: 8.17 M/S
LV SEPTAL E/E' RATIO: 9.8 M/S
MV A" WAVE DURATION": 9.99 MSEC
MV PEAK A VEL: 0.68 M/S
MV PEAK E VEL: 0.49 M/S
MV STENOSIS PRESSURE HALF TIME: 84.38 MS
MV VALVE AREA P 1/2 METHOD: 2.61 CM2
PISA TR MAX VEL: 2.71 M/S
PULM VEIN S/D RATIO: 0.9
PV PEAK D VEL: 0.41 M/S
PV PEAK S VEL: 0.37 M/S
RA MAJOR: 5.04 CM
RA PRESSURE: 3 MMHG
RA WIDTH: 4.21 CM
RIGHT VENTRICULAR END-DIASTOLIC DIMENSION: 4.29 CM
RV TISSUE DOPPLER FREE WALL SYSTOLIC VELOCITY 1 (APICAL 4 CHAMBER VIEW): 9.84 CM/S
SINUS: 3.04 CM
STJ: 2.69 CM
TDI LATERAL: 0.06 M/S
TDI SEPTAL: 0.05 M/S
TDI: 0.06 M/S
TR MAX PG: 29 MMHG
TRICUSPID ANNULAR PLANE SYSTOLIC EXCURSION: 2.04 CM
TV REST PULMONARY ARTERY PRESSURE: 32 MMHG

## 2021-09-07 PROCEDURE — 93306 TTE W/DOPPLER COMPLETE: CPT | Mod: TXP

## 2021-09-07 PROCEDURE — 93306 ECHO (CUPID ONLY): ICD-10-PCS | Mod: 26,TXP,, | Performed by: INTERNAL MEDICINE

## 2021-09-07 PROCEDURE — 93306 TTE W/DOPPLER COMPLETE: CPT | Mod: 26,TXP,, | Performed by: INTERNAL MEDICINE

## 2021-09-08 ENCOUNTER — ANTI-COAG VISIT (OUTPATIENT)
Dept: CARDIOLOGY | Facility: CLINIC | Age: 66
End: 2021-09-08
Payer: MEDICARE

## 2021-09-08 DIAGNOSIS — Z79.01 CURRENT USE OF ANTICOAGULANT THERAPY: ICD-10-CM

## 2021-09-08 DIAGNOSIS — Z01.818 PRE-TRANSPLANT EVALUATION FOR LIVER TRANSPLANT: ICD-10-CM

## 2021-09-08 DIAGNOSIS — I48.19 PERSISTENT ATRIAL FIBRILLATION: Primary | ICD-10-CM

## 2021-09-08 PROCEDURE — 93793 ANTICOAG MGMT PT WARFARIN: CPT | Mod: S$GLB,,,

## 2021-09-08 PROCEDURE — 93793 PR ANTICOAGULANT MGMT FOR PT TAKING WARFARIN: ICD-10-PCS | Mod: S$GLB,,,

## 2021-09-10 ENCOUNTER — DOCUMENTATION ONLY (OUTPATIENT)
Dept: CARDIOLOGY | Facility: CLINIC | Age: 66
End: 2021-09-10

## 2021-09-13 ENCOUNTER — PATIENT MESSAGE (OUTPATIENT)
Dept: TRANSPLANT | Facility: CLINIC | Age: 66
End: 2021-09-13

## 2021-09-13 DIAGNOSIS — K76.6 PORTAL HYPERTENSION: Primary | ICD-10-CM

## 2021-09-13 DIAGNOSIS — Z76.82 AWAITING LIVER TRANSPLANT: ICD-10-CM

## 2021-09-13 DIAGNOSIS — K74.69 OTHER CIRRHOSIS OF LIVER: ICD-10-CM

## 2021-09-13 RX ORDER — PANTOPRAZOLE SODIUM 20 MG/1
20 TABLET, DELAYED RELEASE ORAL DAILY
Qty: 30 TABLET | Refills: 3 | Status: ON HOLD | OUTPATIENT
Start: 2021-09-13 | End: 2022-01-13 | Stop reason: SDUPTHER

## 2021-09-14 ENCOUNTER — LAB VISIT (OUTPATIENT)
Dept: LAB | Facility: HOSPITAL | Age: 66
End: 2021-09-14
Attending: INTERNAL MEDICINE
Payer: MEDICARE

## 2021-09-14 DIAGNOSIS — Z79.01 CURRENT USE OF ANTICOAGULANT THERAPY: ICD-10-CM

## 2021-09-14 DIAGNOSIS — I48.19 PERSISTENT ATRIAL FIBRILLATION: ICD-10-CM

## 2021-09-14 LAB
INR PPP: 2.3 (ref 0.8–1.2)
PROTHROMBIN TIME: 23.5 SEC (ref 9–12.5)

## 2021-09-14 PROCEDURE — 36415 COLL VENOUS BLD VENIPUNCTURE: CPT | Mod: PN,TXP | Performed by: INTERNAL MEDICINE

## 2021-09-14 PROCEDURE — 85610 PROTHROMBIN TIME: CPT | Mod: TXP | Performed by: INTERNAL MEDICINE

## 2021-09-15 ENCOUNTER — ANTI-COAG VISIT (OUTPATIENT)
Dept: CARDIOLOGY | Facility: CLINIC | Age: 66
End: 2021-09-15
Payer: MEDICARE

## 2021-09-15 ENCOUNTER — TELEPHONE (OUTPATIENT)
Dept: TRANSPLANT | Facility: CLINIC | Age: 66
End: 2021-09-15

## 2021-09-15 ENCOUNTER — COMMITTEE REVIEW (OUTPATIENT)
Dept: TRANSPLANT | Facility: CLINIC | Age: 66
End: 2021-09-15
Payer: MEDICARE

## 2021-09-15 DIAGNOSIS — Z79.01 CURRENT USE OF ANTICOAGULANT THERAPY: ICD-10-CM

## 2021-09-15 DIAGNOSIS — I48.19 PERSISTENT ATRIAL FIBRILLATION: Primary | ICD-10-CM

## 2021-09-15 DIAGNOSIS — Z01.818 PRE-TRANSPLANT EVALUATION FOR LIVER TRANSPLANT: ICD-10-CM

## 2021-09-15 PROCEDURE — 93793 ANTICOAG MGMT PT WARFARIN: CPT | Mod: S$GLB,,,

## 2021-09-15 PROCEDURE — 93793 PR ANTICOAGULANT MGMT FOR PT TAKING WARFARIN: ICD-10-PCS | Mod: S$GLB,,,

## 2021-09-16 ENCOUNTER — PATIENT MESSAGE (OUTPATIENT)
Dept: TRANSPLANT | Facility: CLINIC | Age: 66
End: 2021-09-16

## 2021-09-16 ENCOUNTER — TELEPHONE (OUTPATIENT)
Dept: TRANSPLANT | Facility: CLINIC | Age: 66
End: 2021-09-16

## 2021-09-24 ENCOUNTER — TELEPHONE (OUTPATIENT)
Dept: TRANSPLANT | Facility: CLINIC | Age: 66
End: 2021-09-24

## 2021-09-24 ENCOUNTER — NURSE TRIAGE (OUTPATIENT)
Dept: ADMINISTRATIVE | Facility: CLINIC | Age: 66
End: 2021-09-24

## 2021-09-24 ENCOUNTER — HOSPITAL ENCOUNTER (EMERGENCY)
Facility: HOSPITAL | Age: 66
Discharge: HOME | End: 2021-09-24
Attending: EMERGENCY MEDICINE
Payer: MEDICARE

## 2021-09-24 ENCOUNTER — PATIENT MESSAGE (OUTPATIENT)
Dept: TRANSPLANT | Facility: CLINIC | Age: 66
End: 2021-09-24

## 2021-09-24 VITALS
RESPIRATION RATE: 18 BRPM | TEMPERATURE: 98 F | HEIGHT: 72 IN | BODY MASS INDEX: 29.8 KG/M2 | DIASTOLIC BLOOD PRESSURE: 81 MMHG | WEIGHT: 220 LBS | OXYGEN SATURATION: 97 % | HEART RATE: 54 BPM | SYSTOLIC BLOOD PRESSURE: 187 MMHG

## 2021-09-24 DIAGNOSIS — N13.2 URETERAL STONE WITH HYDRONEPHROSIS: Primary | ICD-10-CM

## 2021-09-24 LAB
BACTERIA #/AREA URNS AUTO: ABNORMAL /HPF
BASOPHILS # BLD AUTO: 0.07 K/UL (ref 0–0.2)
BASOPHILS NFR BLD: 0.7 % (ref 0–1.9)
BILIRUB UR QL STRIP: NEGATIVE
CLARITY UR REFRACT.AUTO: ABNORMAL
COLOR UR AUTO: YELLOW
DIFFERENTIAL METHOD: ABNORMAL
EOSINOPHIL # BLD AUTO: 0.2 K/UL (ref 0–0.5)
EOSINOPHIL NFR BLD: 1.5 % (ref 0–8)
ERYTHROCYTE [DISTWIDTH] IN BLOOD BY AUTOMATED COUNT: 13.2 % (ref 11.5–14.5)
GLUCOSE UR QL STRIP: ABNORMAL
HCT VFR BLD AUTO: 46.7 % (ref 40–54)
HGB BLD-MCNC: 15.4 G/DL (ref 14–18)
HGB UR QL STRIP: ABNORMAL
HYALINE CASTS UR QL AUTO: 0 /LPF
IMM GRANULOCYTES # BLD AUTO: 0.06 K/UL (ref 0–0.04)
IMM GRANULOCYTES NFR BLD AUTO: 0.6 % (ref 0–0.5)
INR PPP: 1.9 (ref 0.8–1.2)
KETONES UR QL STRIP: NEGATIVE
LEUKOCYTE ESTERASE UR QL STRIP: NEGATIVE
LYMPHOCYTES # BLD AUTO: 1 K/UL (ref 1–4.8)
LYMPHOCYTES NFR BLD: 9.8 % (ref 18–48)
MCH RBC QN AUTO: 29.7 PG (ref 27–31)
MCHC RBC AUTO-ENTMCNC: 33 G/DL (ref 32–36)
MCV RBC AUTO: 90 FL (ref 82–98)
MICROSCOPIC COMMENT: ABNORMAL
MONOCYTES # BLD AUTO: 0.9 K/UL (ref 0.3–1)
MONOCYTES NFR BLD: 8.6 % (ref 4–15)
NEUTROPHILS # BLD AUTO: 7.9 K/UL (ref 1.8–7.7)
NEUTROPHILS NFR BLD: 78.8 % (ref 38–73)
NITRITE UR QL STRIP: NEGATIVE
NRBC BLD-RTO: 0 /100 WBC
PH UR STRIP: 5 [PH] (ref 5–8)
PLATELET # BLD AUTO: 147 K/UL (ref 150–450)
PMV BLD AUTO: 11.1 FL (ref 9.2–12.9)
PROT UR QL STRIP: ABNORMAL
PROTHROMBIN TIME: 20.4 SEC (ref 9–12.5)
RBC # BLD AUTO: 5.18 M/UL (ref 4.6–6.2)
RBC #/AREA URNS AUTO: >100 /HPF (ref 0–4)
SP GR UR STRIP: 1.02 (ref 1–1.03)
URN SPEC COLLECT METH UR: ABNORMAL
WBC # BLD AUTO: 10.05 K/UL (ref 3.9–12.7)
WBC #/AREA URNS AUTO: 8 /HPF (ref 0–5)

## 2021-09-24 PROCEDURE — 80047 BASIC METABLC PNL IONIZED CA: CPT | Mod: NTX

## 2021-09-24 PROCEDURE — 96361 HYDRATE IV INFUSION ADD-ON: CPT | Mod: NTX

## 2021-09-24 PROCEDURE — 85610 PROTHROMBIN TIME: CPT | Mod: NTX | Performed by: EMERGENCY MEDICINE

## 2021-09-24 PROCEDURE — 81001 URINALYSIS AUTO W/SCOPE: CPT | Mod: NTX | Performed by: EMERGENCY MEDICINE

## 2021-09-24 PROCEDURE — 99284 PR EMERGENCY DEPT VISIT,LEVEL IV: ICD-10-PCS | Mod: NTX,,, | Performed by: EMERGENCY MEDICINE

## 2021-09-24 PROCEDURE — 25000003 PHARM REV CODE 250: Mod: NTX | Performed by: EMERGENCY MEDICINE

## 2021-09-24 PROCEDURE — 99284 EMERGENCY DEPT VISIT MOD MDM: CPT | Mod: 25,NTX

## 2021-09-24 PROCEDURE — 96374 THER/PROPH/DIAG INJ IV PUSH: CPT | Mod: NTX

## 2021-09-24 PROCEDURE — 63600175 PHARM REV CODE 636 W HCPCS: Mod: NTX | Performed by: EMERGENCY MEDICINE

## 2021-09-24 PROCEDURE — 96376 TX/PRO/DX INJ SAME DRUG ADON: CPT | Mod: NTX

## 2021-09-24 PROCEDURE — 99284 EMERGENCY DEPT VISIT MOD MDM: CPT | Mod: NTX,,, | Performed by: EMERGENCY MEDICINE

## 2021-09-24 PROCEDURE — 85025 COMPLETE CBC W/AUTO DIFF WBC: CPT | Mod: NTX | Performed by: EMERGENCY MEDICINE

## 2021-09-24 RX ORDER — MORPHINE SULFATE 4 MG/ML
4 INJECTION, SOLUTION INTRAMUSCULAR; INTRAVENOUS
Status: COMPLETED | OUTPATIENT
Start: 2021-09-24 | End: 2021-09-24

## 2021-09-24 RX ORDER — TAMSULOSIN HYDROCHLORIDE 0.4 MG/1
0.4 CAPSULE ORAL DAILY
Qty: 10 CAPSULE | Refills: 0 | Status: SHIPPED | OUTPATIENT
Start: 2021-09-24 | End: 2021-09-29

## 2021-09-24 RX ORDER — OXYCODONE AND ACETAMINOPHEN 5; 325 MG/1; MG/1
1 TABLET ORAL EVERY 4 HOURS PRN
Qty: 18 TABLET | Refills: 0 | Status: ON HOLD | OUTPATIENT
Start: 2021-09-24 | End: 2022-01-13 | Stop reason: HOSPADM

## 2021-09-24 RX ORDER — ACETAMINOPHEN 325 MG/1
650 TABLET ORAL
Status: COMPLETED | OUTPATIENT
Start: 2021-09-24 | End: 2021-09-24

## 2021-09-24 RX ADMIN — ACETAMINOPHEN 650 MG: 325 TABLET ORAL at 10:09

## 2021-09-24 RX ADMIN — MORPHINE SULFATE 4 MG: 4 INJECTION INTRAVENOUS at 07:09

## 2021-09-24 RX ADMIN — MORPHINE SULFATE 4 MG: 4 INJECTION INTRAVENOUS at 10:09

## 2021-09-24 RX ADMIN — SODIUM CHLORIDE 500 ML: 0.9 INJECTION, SOLUTION INTRAVENOUS at 10:09

## 2021-09-25 LAB
BUN SERPL-MCNC: 22 MG/DL (ref 6–30)
CHLORIDE SERPL-SCNC: 103 MMOL/L (ref 95–110)
CREAT SERPL-MCNC: 0.9 MG/DL (ref 0.5–1.4)
GLUCOSE SERPL-MCNC: 187 MG/DL (ref 70–110)
HCT VFR BLD CALC: 47 %PCV (ref 36–54)
POC IONIZED CALCIUM: 1.23 MMOL/L (ref 1.06–1.42)
POC TCO2 (MEASURED): 24 MMOL/L (ref 23–29)
POTASSIUM BLD-SCNC: 4.2 MMOL/L (ref 3.5–5.1)
SAMPLE: ABNORMAL
SODIUM BLD-SCNC: 140 MMOL/L (ref 136–145)

## 2021-09-28 ENCOUNTER — LAB VISIT (OUTPATIENT)
Dept: LAB | Facility: HOSPITAL | Age: 66
End: 2021-09-28
Attending: INTERNAL MEDICINE
Payer: MEDICARE

## 2021-09-28 DIAGNOSIS — E78.5 HYPERLIPIDEMIA, UNSPECIFIED HYPERLIPIDEMIA TYPE: ICD-10-CM

## 2021-09-28 DIAGNOSIS — Z79.01 CURRENT USE OF ANTICOAGULANT THERAPY: ICD-10-CM

## 2021-09-28 DIAGNOSIS — I48.19 PERSISTENT ATRIAL FIBRILLATION: ICD-10-CM

## 2021-09-28 DIAGNOSIS — E08.610 DIABETES MELLITUS DUE TO UNDERLYING CONDITION WITH DIABETIC NEUROPATHIC ARTHROPATHY: ICD-10-CM

## 2021-09-28 DIAGNOSIS — K76.6 PORTAL HYPERTENSION: ICD-10-CM

## 2021-09-28 DIAGNOSIS — N20.0 KIDNEY STONES: ICD-10-CM

## 2021-09-28 DIAGNOSIS — K06.9 GUM DISEASE: ICD-10-CM

## 2021-09-28 DIAGNOSIS — R16.0 LIVER MASS: ICD-10-CM

## 2021-09-28 DIAGNOSIS — K75.81 NASH (NONALCOHOLIC STEATOHEPATITIS): ICD-10-CM

## 2021-09-28 DIAGNOSIS — I10 HYPERTENSION, UNSPECIFIED TYPE: ICD-10-CM

## 2021-09-28 DIAGNOSIS — E13.65 OTHER SPECIFIED DIABETES MELLITUS WITH HYPERGLYCEMIA: ICD-10-CM

## 2021-09-28 DIAGNOSIS — C22.0 HCC (HEPATOCELLULAR CARCINOMA): ICD-10-CM

## 2021-09-28 DIAGNOSIS — C44.90 SKIN CANCER: ICD-10-CM

## 2021-09-28 DIAGNOSIS — Z01.818 PRE-TRANSPLANT EVALUATION FOR LIVER TRANSPLANT: ICD-10-CM

## 2021-09-28 DIAGNOSIS — K74.60 HEPATIC CIRRHOSIS, UNSPECIFIED HEPATIC CIRRHOSIS TYPE, UNSPECIFIED WHETHER ASCITES PRESENT: ICD-10-CM

## 2021-09-28 LAB
ALBUMIN SERPL BCP-MCNC: 3.5 G/DL (ref 3.5–5.2)
ALP SERPL-CCNC: 64 U/L (ref 55–135)
ALT SERPL W/O P-5'-P-CCNC: 20 U/L (ref 10–44)
ANION GAP SERPL CALC-SCNC: 12 MMOL/L (ref 8–16)
AST SERPL-CCNC: 22 U/L (ref 10–40)
BILIRUB SERPL-MCNC: 0.5 MG/DL (ref 0.1–1)
BUN SERPL-MCNC: 18 MG/DL (ref 8–23)
CALCIUM SERPL-MCNC: 9.3 MG/DL (ref 8.7–10.5)
CHLORIDE SERPL-SCNC: 104 MMOL/L (ref 95–110)
CO2 SERPL-SCNC: 23 MMOL/L (ref 23–29)
CREAT SERPL-MCNC: 0.8 MG/DL (ref 0.5–1.4)
EST. GFR  (AFRICAN AMERICAN): >60 ML/MIN/1.73 M^2
EST. GFR  (NON AFRICAN AMERICAN): >60 ML/MIN/1.73 M^2
GLUCOSE SERPL-MCNC: 95 MG/DL (ref 70–110)
INR PPP: 2.6 (ref 0.8–1.2)
POTASSIUM SERPL-SCNC: 4.2 MMOL/L (ref 3.5–5.1)
PROT SERPL-MCNC: 7.1 G/DL (ref 6–8.4)
PROTHROMBIN TIME: 27.3 SEC (ref 9–12.5)
SODIUM SERPL-SCNC: 139 MMOL/L (ref 136–145)

## 2021-09-28 PROCEDURE — 36415 COLL VENOUS BLD VENIPUNCTURE: CPT | Mod: PN,TXP | Performed by: INTERNAL MEDICINE

## 2021-09-28 PROCEDURE — 80053 COMPREHEN METABOLIC PANEL: CPT | Mod: NTX | Performed by: INTERNAL MEDICINE

## 2021-09-28 PROCEDURE — 85610 PROTHROMBIN TIME: CPT | Mod: NTX | Performed by: INTERNAL MEDICINE

## 2021-09-29 ENCOUNTER — TELEPHONE (OUTPATIENT)
Dept: TRANSPLANT | Facility: CLINIC | Age: 66
End: 2021-09-29

## 2021-09-29 ENCOUNTER — OFFICE VISIT (OUTPATIENT)
Dept: UROLOGY | Facility: CLINIC | Age: 66
End: 2021-09-29
Payer: MEDICARE

## 2021-09-29 ENCOUNTER — ANTI-COAG VISIT (OUTPATIENT)
Dept: CARDIOLOGY | Facility: CLINIC | Age: 66
End: 2021-09-29
Payer: MEDICARE

## 2021-09-29 VITALS
SYSTOLIC BLOOD PRESSURE: 142 MMHG | BODY MASS INDEX: 31.05 KG/M2 | HEART RATE: 80 BPM | DIASTOLIC BLOOD PRESSURE: 76 MMHG | HEIGHT: 72 IN | WEIGHT: 229.25 LBS

## 2021-09-29 DIAGNOSIS — N20.1 LEFT URETERAL STONE: Primary | ICD-10-CM

## 2021-09-29 DIAGNOSIS — Z79.01 CURRENT USE OF ANTICOAGULANT THERAPY: ICD-10-CM

## 2021-09-29 DIAGNOSIS — N20.0 NEPHROLITHIASIS: ICD-10-CM

## 2021-09-29 DIAGNOSIS — R31.0 GROSS HEMATURIA: ICD-10-CM

## 2021-09-29 DIAGNOSIS — Z01.818 PRE-TRANSPLANT EVALUATION FOR LIVER TRANSPLANT: ICD-10-CM

## 2021-09-29 DIAGNOSIS — I48.19 PERSISTENT ATRIAL FIBRILLATION: Primary | ICD-10-CM

## 2021-09-29 PROCEDURE — 3066F PR DOCUMENTATION OF TREATMENT FOR NEPHROPATHY: ICD-10-PCS | Mod: CPTII,S$GLB,TXP, | Performed by: PHYSICIAN ASSISTANT

## 2021-09-29 PROCEDURE — 3008F PR BODY MASS INDEX (BMI) DOCUMENTED: ICD-10-PCS | Mod: CPTII,S$GLB,TXP, | Performed by: PHYSICIAN ASSISTANT

## 2021-09-29 PROCEDURE — 1126F PR PAIN SEVERITY QUANTIFIED, NO PAIN PRESENT: ICD-10-PCS | Mod: CPTII,S$GLB,TXP, | Performed by: PHYSICIAN ASSISTANT

## 2021-09-29 PROCEDURE — 93793 PR ANTICOAGULANT MGMT FOR PT TAKING WARFARIN: ICD-10-PCS | Mod: S$GLB,,,

## 2021-09-29 PROCEDURE — 3066F NEPHROPATHY DOC TX: CPT | Mod: CPTII,S$GLB,TXP, | Performed by: PHYSICIAN ASSISTANT

## 2021-09-29 PROCEDURE — 88112 CYTOPATH CELL ENHANCE TECH: CPT | Mod: 26,TXP,, | Performed by: PATHOLOGY

## 2021-09-29 PROCEDURE — 3077F PR MOST RECENT SYSTOLIC BLOOD PRESSURE >= 140 MM HG: ICD-10-PCS | Mod: CPTII,S$GLB,TXP, | Performed by: PHYSICIAN ASSISTANT

## 2021-09-29 PROCEDURE — 3078F DIAST BP <80 MM HG: CPT | Mod: CPTII,S$GLB,TXP, | Performed by: PHYSICIAN ASSISTANT

## 2021-09-29 PROCEDURE — 88112 PR  CYTOPATH, CELL ENHANCE TECH: ICD-10-PCS | Mod: 26,TXP,, | Performed by: PATHOLOGY

## 2021-09-29 PROCEDURE — 3078F PR MOST RECENT DIASTOLIC BLOOD PRESSURE < 80 MM HG: ICD-10-PCS | Mod: CPTII,S$GLB,TXP, | Performed by: PHYSICIAN ASSISTANT

## 2021-09-29 PROCEDURE — 3051F HG A1C>EQUAL 7.0%<8.0%: CPT | Mod: CPTII,S$GLB,TXP, | Performed by: PHYSICIAN ASSISTANT

## 2021-09-29 PROCEDURE — 1160F RVW MEDS BY RX/DR IN RCRD: CPT | Mod: CPTII,S$GLB,TXP, | Performed by: PHYSICIAN ASSISTANT

## 2021-09-29 PROCEDURE — 1160F PR REVIEW ALL MEDS BY PRESCRIBER/CLIN PHARMACIST DOCUMENTED: ICD-10-PCS | Mod: CPTII,S$GLB,TXP, | Performed by: PHYSICIAN ASSISTANT

## 2021-09-29 PROCEDURE — 3061F NEG MICROALBUMINURIA REV: CPT | Mod: CPTII,S$GLB,TXP, | Performed by: PHYSICIAN ASSISTANT

## 2021-09-29 PROCEDURE — 1159F MED LIST DOCD IN RCRD: CPT | Mod: CPTII,S$GLB,TXP, | Performed by: PHYSICIAN ASSISTANT

## 2021-09-29 PROCEDURE — 3061F PR NEG MICROALBUMINURIA RESULT DOCUMENTED/REVIEW: ICD-10-PCS | Mod: CPTII,S$GLB,TXP, | Performed by: PHYSICIAN ASSISTANT

## 2021-09-29 PROCEDURE — 99204 OFFICE O/P NEW MOD 45 MIN: CPT | Mod: S$GLB,TXP,, | Performed by: PHYSICIAN ASSISTANT

## 2021-09-29 PROCEDURE — 3077F SYST BP >= 140 MM HG: CPT | Mod: CPTII,S$GLB,TXP, | Performed by: PHYSICIAN ASSISTANT

## 2021-09-29 PROCEDURE — 93793 ANTICOAG MGMT PT WARFARIN: CPT | Mod: S$GLB,,,

## 2021-09-29 PROCEDURE — 88112 CYTOPATH CELL ENHANCE TECH: CPT | Mod: TXP | Performed by: PATHOLOGY

## 2021-09-29 PROCEDURE — 3008F BODY MASS INDEX DOCD: CPT | Mod: CPTII,S$GLB,TXP, | Performed by: PHYSICIAN ASSISTANT

## 2021-09-29 PROCEDURE — 99999 PR PBB SHADOW E&M-EST. PATIENT-LVL IV: CPT | Mod: PBBFAC,TXP,, | Performed by: PHYSICIAN ASSISTANT

## 2021-09-29 PROCEDURE — 4010F ACE/ARB THERAPY RXD/TAKEN: CPT | Mod: CPTII,S$GLB,TXP, | Performed by: PHYSICIAN ASSISTANT

## 2021-09-29 PROCEDURE — 1126F AMNT PAIN NOTED NONE PRSNT: CPT | Mod: CPTII,S$GLB,TXP, | Performed by: PHYSICIAN ASSISTANT

## 2021-09-29 PROCEDURE — 99999 PR PBB SHADOW E&M-EST. PATIENT-LVL IV: ICD-10-PCS | Mod: PBBFAC,TXP,, | Performed by: PHYSICIAN ASSISTANT

## 2021-09-29 PROCEDURE — 4010F PR ACE/ARB THEARPY RXD/TAKEN: ICD-10-PCS | Mod: CPTII,S$GLB,TXP, | Performed by: PHYSICIAN ASSISTANT

## 2021-09-29 PROCEDURE — 99204 PR OFFICE/OUTPT VISIT, NEW, LEVL IV, 45-59 MIN: ICD-10-PCS | Mod: S$GLB,TXP,, | Performed by: PHYSICIAN ASSISTANT

## 2021-09-29 PROCEDURE — 3051F PR MOST RECENT HEMOGLOBIN A1C LEVEL 7.0 - < 8.0%: ICD-10-PCS | Mod: CPTII,S$GLB,TXP, | Performed by: PHYSICIAN ASSISTANT

## 2021-09-29 PROCEDURE — 1159F PR MEDICATION LIST DOCUMENTED IN MEDICAL RECORD: ICD-10-PCS | Mod: CPTII,S$GLB,TXP, | Performed by: PHYSICIAN ASSISTANT

## 2021-09-29 RX ORDER — TAMSULOSIN HYDROCHLORIDE 0.4 MG/1
0.4 CAPSULE ORAL NIGHTLY
Qty: 30 CAPSULE | Refills: 1 | Status: ON HOLD | OUTPATIENT
Start: 2021-09-29 | End: 2022-01-13 | Stop reason: SDUPTHER

## 2021-09-29 RX ORDER — CIPROFLOXACIN 500 MG/1
500 TABLET ORAL ONCE
Status: CANCELLED | OUTPATIENT
Start: 2021-09-29 | End: 2021-09-29

## 2021-09-29 RX ORDER — LIDOCAINE HYDROCHLORIDE 20 MG/ML
JELLY TOPICAL ONCE
Status: CANCELLED | OUTPATIENT
Start: 2021-09-29 | End: 2021-09-29

## 2021-09-30 ENCOUNTER — TELEPHONE (OUTPATIENT)
Dept: UROLOGY | Facility: CLINIC | Age: 66
End: 2021-09-30

## 2021-09-30 DIAGNOSIS — N20.1 URETERAL STONE: Primary | ICD-10-CM

## 2021-10-01 ENCOUNTER — HOSPITAL ENCOUNTER (OUTPATIENT)
Dept: RADIOLOGY | Facility: HOSPITAL | Age: 66
Discharge: HOME | End: 2021-10-01
Attending: PHYSICIAN ASSISTANT
Payer: MEDICARE

## 2021-10-01 DIAGNOSIS — N20.1 URETERAL STONE: ICD-10-CM

## 2021-10-01 LAB
FINAL PATHOLOGIC DIAGNOSIS: NORMAL
Lab: NORMAL

## 2021-10-01 PROCEDURE — 74018 RADEX ABDOMEN 1 VIEW: CPT | Mod: TC,TXP

## 2021-10-01 PROCEDURE — 74018 XR ABDOMEN AP 1 VIEW: ICD-10-PCS | Mod: 26,NTX,, | Performed by: RADIOLOGY

## 2021-10-01 PROCEDURE — 74018 RADEX ABDOMEN 1 VIEW: CPT | Mod: 26,NTX,, | Performed by: RADIOLOGY

## 2021-10-04 ENCOUNTER — OFFICE VISIT (OUTPATIENT)
Dept: UROLOGY | Facility: CLINIC | Age: 66
End: 2021-10-04
Payer: MEDICARE

## 2021-10-04 ENCOUNTER — TELEPHONE (OUTPATIENT)
Dept: TRANSPLANT | Facility: CLINIC | Age: 66
End: 2021-10-04

## 2021-10-04 VITALS
HEIGHT: 72 IN | WEIGHT: 235.44 LBS | BODY MASS INDEX: 31.89 KG/M2 | SYSTOLIC BLOOD PRESSURE: 155 MMHG | HEART RATE: 64 BPM | DIASTOLIC BLOOD PRESSURE: 66 MMHG

## 2021-10-04 DIAGNOSIS — N20.1 URETERAL CALCULUS: Primary | ICD-10-CM

## 2021-10-04 PROCEDURE — 99999 PR PBB SHADOW E&M-EST. PATIENT-LVL V: CPT | Mod: PBBFAC,TXP,, | Performed by: UROLOGY

## 2021-10-04 PROCEDURE — 3061F PR NEG MICROALBUMINURIA RESULT DOCUMENTED/REVIEW: ICD-10-PCS | Mod: CPTII,S$GLB,TXP, | Performed by: UROLOGY

## 2021-10-04 PROCEDURE — 1159F PR MEDICATION LIST DOCUMENTED IN MEDICAL RECORD: ICD-10-PCS | Mod: CPTII,S$GLB,TXP, | Performed by: UROLOGY

## 2021-10-04 PROCEDURE — 99214 PR OFFICE/OUTPT VISIT, EST, LEVL IV, 30-39 MIN: ICD-10-PCS | Mod: S$GLB,TXP,, | Performed by: UROLOGY

## 2021-10-04 PROCEDURE — 3078F PR MOST RECENT DIASTOLIC BLOOD PRESSURE < 80 MM HG: ICD-10-PCS | Mod: CPTII,S$GLB,TXP, | Performed by: UROLOGY

## 2021-10-04 PROCEDURE — 4010F PR ACE/ARB THEARPY RXD/TAKEN: ICD-10-PCS | Mod: CPTII,S$GLB,TXP, | Performed by: UROLOGY

## 2021-10-04 PROCEDURE — 3078F DIAST BP <80 MM HG: CPT | Mod: CPTII,S$GLB,TXP, | Performed by: UROLOGY

## 2021-10-04 PROCEDURE — 3288F PR FALLS RISK ASSESSMENT DOCUMENTED: ICD-10-PCS | Mod: CPTII,S$GLB,TXP, | Performed by: UROLOGY

## 2021-10-04 PROCEDURE — 3077F PR MOST RECENT SYSTOLIC BLOOD PRESSURE >= 140 MM HG: ICD-10-PCS | Mod: CPTII,S$GLB,TXP, | Performed by: UROLOGY

## 2021-10-04 PROCEDURE — 99999 PR PBB SHADOW E&M-EST. PATIENT-LVL V: ICD-10-PCS | Mod: PBBFAC,TXP,, | Performed by: UROLOGY

## 2021-10-04 PROCEDURE — 1160F RVW MEDS BY RX/DR IN RCRD: CPT | Mod: CPTII,S$GLB,TXP, | Performed by: UROLOGY

## 2021-10-04 PROCEDURE — 3288F FALL RISK ASSESSMENT DOCD: CPT | Mod: CPTII,S$GLB,TXP, | Performed by: UROLOGY

## 2021-10-04 PROCEDURE — 1101F PR PT FALLS ASSESS DOC 0-1 FALLS W/OUT INJ PAST YR: ICD-10-PCS | Mod: CPTII,S$GLB,TXP, | Performed by: UROLOGY

## 2021-10-04 PROCEDURE — 3008F BODY MASS INDEX DOCD: CPT | Mod: CPTII,S$GLB,TXP, | Performed by: UROLOGY

## 2021-10-04 PROCEDURE — 1159F MED LIST DOCD IN RCRD: CPT | Mod: CPTII,S$GLB,TXP, | Performed by: UROLOGY

## 2021-10-04 PROCEDURE — 4010F ACE/ARB THERAPY RXD/TAKEN: CPT | Mod: CPTII,S$GLB,TXP, | Performed by: UROLOGY

## 2021-10-04 PROCEDURE — 3051F PR MOST RECENT HEMOGLOBIN A1C LEVEL 7.0 - < 8.0%: ICD-10-PCS | Mod: CPTII,S$GLB,TXP, | Performed by: UROLOGY

## 2021-10-04 PROCEDURE — 3066F PR DOCUMENTATION OF TREATMENT FOR NEPHROPATHY: ICD-10-PCS | Mod: CPTII,S$GLB,TXP, | Performed by: UROLOGY

## 2021-10-04 PROCEDURE — 1126F PR PAIN SEVERITY QUANTIFIED, NO PAIN PRESENT: ICD-10-PCS | Mod: CPTII,S$GLB,TXP, | Performed by: UROLOGY

## 2021-10-04 PROCEDURE — 3051F HG A1C>EQUAL 7.0%<8.0%: CPT | Mod: CPTII,S$GLB,TXP, | Performed by: UROLOGY

## 2021-10-04 PROCEDURE — 1160F PR REVIEW ALL MEDS BY PRESCRIBER/CLIN PHARMACIST DOCUMENTED: ICD-10-PCS | Mod: CPTII,S$GLB,TXP, | Performed by: UROLOGY

## 2021-10-04 PROCEDURE — 3008F PR BODY MASS INDEX (BMI) DOCUMENTED: ICD-10-PCS | Mod: CPTII,S$GLB,TXP, | Performed by: UROLOGY

## 2021-10-04 PROCEDURE — 1101F PT FALLS ASSESS-DOCD LE1/YR: CPT | Mod: CPTII,S$GLB,TXP, | Performed by: UROLOGY

## 2021-10-04 PROCEDURE — 1126F AMNT PAIN NOTED NONE PRSNT: CPT | Mod: CPTII,S$GLB,TXP, | Performed by: UROLOGY

## 2021-10-04 PROCEDURE — 3077F SYST BP >= 140 MM HG: CPT | Mod: CPTII,S$GLB,TXP, | Performed by: UROLOGY

## 2021-10-04 PROCEDURE — 3066F NEPHROPATHY DOC TX: CPT | Mod: CPTII,S$GLB,TXP, | Performed by: UROLOGY

## 2021-10-04 PROCEDURE — 3061F NEG MICROALBUMINURIA REV: CPT | Mod: CPTII,S$GLB,TXP, | Performed by: UROLOGY

## 2021-10-04 PROCEDURE — 99214 OFFICE O/P EST MOD 30 MIN: CPT | Mod: S$GLB,TXP,, | Performed by: UROLOGY

## 2021-10-05 ENCOUNTER — PATIENT MESSAGE (OUTPATIENT)
Dept: TRANSPLANT | Facility: CLINIC | Age: 66
End: 2021-10-05

## 2021-10-12 ENCOUNTER — LAB VISIT (OUTPATIENT)
Dept: LAB | Facility: HOSPITAL | Age: 66
End: 2021-10-12
Attending: INTERNAL MEDICINE
Payer: MEDICARE

## 2021-10-12 ENCOUNTER — CLINICAL SUPPORT (OUTPATIENT)
Dept: INFECTIOUS DISEASES | Facility: CLINIC | Age: 66
End: 2021-10-12
Payer: MEDICARE

## 2021-10-12 ENCOUNTER — ANTI-COAG VISIT (OUTPATIENT)
Dept: CARDIOLOGY | Facility: CLINIC | Age: 66
End: 2021-10-12
Payer: MEDICARE

## 2021-10-12 DIAGNOSIS — K76.6 PORTAL HYPERTENSION: ICD-10-CM

## 2021-10-12 DIAGNOSIS — Z01.818 PRE-TRANSPLANT EVALUATION FOR LIVER TRANSPLANT: ICD-10-CM

## 2021-10-12 DIAGNOSIS — E11.40 TYPE 2 DIABETES MELLITUS WITH DIABETIC NEUROPATHY, UNSPECIFIED WHETHER LONG TERM INSULIN USE: ICD-10-CM

## 2021-10-12 DIAGNOSIS — I48.19 PERSISTENT ATRIAL FIBRILLATION: ICD-10-CM

## 2021-10-12 DIAGNOSIS — Z79.01 CURRENT USE OF ANTICOAGULANT THERAPY: ICD-10-CM

## 2021-10-12 DIAGNOSIS — K74.60 HEPATIC CIRRHOSIS, UNSPECIFIED HEPATIC CIRRHOSIS TYPE, UNSPECIFIED WHETHER ASCITES PRESENT: ICD-10-CM

## 2021-10-12 DIAGNOSIS — K75.81 NASH (NONALCOHOLIC STEATOHEPATITIS): ICD-10-CM

## 2021-10-12 DIAGNOSIS — C22.0 HEPATOCELLULAR CARCINOMA: ICD-10-CM

## 2021-10-12 DIAGNOSIS — I48.19 PERSISTENT ATRIAL FIBRILLATION: Primary | ICD-10-CM

## 2021-10-12 DIAGNOSIS — Z71.85 VACCINE COUNSELING: ICD-10-CM

## 2021-10-12 LAB
INR PPP: 2.6 (ref 0.8–1.2)
PROTHROMBIN TIME: 26.8 SEC (ref 9–12.5)

## 2021-10-12 PROCEDURE — 85610 PROTHROMBIN TIME: CPT | Mod: NTX | Performed by: INTERNAL MEDICINE

## 2021-10-12 PROCEDURE — 90632 HEPA VACCINE ADULT IM: CPT | Mod: S$GLB,TXP,, | Performed by: INTERNAL MEDICINE

## 2021-10-12 PROCEDURE — 90632 HEPATITIS A VACCINE ADULT IM: ICD-10-PCS | Mod: S$GLB,TXP,, | Performed by: INTERNAL MEDICINE

## 2021-10-12 PROCEDURE — 90471 IMMUNIZATION ADMIN: CPT | Mod: S$GLB,TXP,, | Performed by: INTERNAL MEDICINE

## 2021-10-12 PROCEDURE — 90471 HEPATITIS A VACCINE ADULT IM: ICD-10-PCS | Mod: S$GLB,TXP,, | Performed by: INTERNAL MEDICINE

## 2021-10-12 PROCEDURE — 93793 ANTICOAG MGMT PT WARFARIN: CPT | Mod: S$GLB,,,

## 2021-10-12 PROCEDURE — 36415 COLL VENOUS BLD VENIPUNCTURE: CPT | Mod: PN,TXP | Performed by: INTERNAL MEDICINE

## 2021-10-12 PROCEDURE — 93793 PR ANTICOAGULANT MGMT FOR PT TAKING WARFARIN: ICD-10-PCS | Mod: S$GLB,,,

## 2021-10-21 ENCOUNTER — OFFICE VISIT (OUTPATIENT)
Dept: UROLOGY | Facility: CLINIC | Age: 66
End: 2021-10-21
Payer: MEDICARE

## 2021-10-21 ENCOUNTER — HOSPITAL ENCOUNTER (OUTPATIENT)
Dept: RADIOLOGY | Facility: HOSPITAL | Age: 66
Discharge: HOME | End: 2021-10-21
Attending: UROLOGY
Payer: MEDICARE

## 2021-10-21 VITALS
HEIGHT: 73 IN | HEART RATE: 60 BPM | SYSTOLIC BLOOD PRESSURE: 163 MMHG | DIASTOLIC BLOOD PRESSURE: 73 MMHG | BODY MASS INDEX: 30.45 KG/M2 | WEIGHT: 229.75 LBS

## 2021-10-21 DIAGNOSIS — N20.1 URETERAL CALCULUS: ICD-10-CM

## 2021-10-21 DIAGNOSIS — N20.1 URETERAL CALCULUS: Primary | ICD-10-CM

## 2021-10-21 PROCEDURE — 3078F PR MOST RECENT DIASTOLIC BLOOD PRESSURE < 80 MM HG: ICD-10-PCS | Mod: CPTII,NTX,S$GLB, | Performed by: UROLOGY

## 2021-10-21 PROCEDURE — 3008F PR BODY MASS INDEX (BMI) DOCUMENTED: ICD-10-PCS | Mod: CPTII,NTX,S$GLB, | Performed by: UROLOGY

## 2021-10-21 PROCEDURE — 1126F AMNT PAIN NOTED NONE PRSNT: CPT | Mod: CPTII,NTX,S$GLB, | Performed by: UROLOGY

## 2021-10-21 PROCEDURE — 3051F HG A1C>EQUAL 7.0%<8.0%: CPT | Mod: CPTII,NTX,S$GLB, | Performed by: UROLOGY

## 2021-10-21 PROCEDURE — 74018 RADEX ABDOMEN 1 VIEW: CPT | Mod: TC,TXP

## 2021-10-21 PROCEDURE — 99999 PR PBB SHADOW E&M-EST. PATIENT-LVL IV: CPT | Mod: PBBFAC,TXP,, | Performed by: UROLOGY

## 2021-10-21 PROCEDURE — 74018 XR ABDOMEN AP 1 VIEW: ICD-10-PCS | Mod: 26,NTX,, | Performed by: RADIOLOGY

## 2021-10-21 PROCEDURE — 4010F PR ACE/ARB THEARPY RXD/TAKEN: ICD-10-PCS | Mod: CPTII,NTX,S$GLB, | Performed by: UROLOGY

## 2021-10-21 PROCEDURE — 1159F PR MEDICATION LIST DOCUMENTED IN MEDICAL RECORD: ICD-10-PCS | Mod: CPTII,NTX,S$GLB, | Performed by: UROLOGY

## 2021-10-21 PROCEDURE — 3066F PR DOCUMENTATION OF TREATMENT FOR NEPHROPATHY: ICD-10-PCS | Mod: CPTII,NTX,S$GLB, | Performed by: UROLOGY

## 2021-10-21 PROCEDURE — 99213 OFFICE O/P EST LOW 20 MIN: CPT | Mod: NTX,S$GLB,, | Performed by: UROLOGY

## 2021-10-21 PROCEDURE — 3061F NEG MICROALBUMINURIA REV: CPT | Mod: CPTII,NTX,S$GLB, | Performed by: UROLOGY

## 2021-10-21 PROCEDURE — 1126F PR PAIN SEVERITY QUANTIFIED, NO PAIN PRESENT: ICD-10-PCS | Mod: CPTII,NTX,S$GLB, | Performed by: UROLOGY

## 2021-10-21 PROCEDURE — 99213 PR OFFICE/OUTPT VISIT, EST, LEVL III, 20-29 MIN: ICD-10-PCS | Mod: NTX,S$GLB,, | Performed by: UROLOGY

## 2021-10-21 PROCEDURE — 1159F MED LIST DOCD IN RCRD: CPT | Mod: CPTII,NTX,S$GLB, | Performed by: UROLOGY

## 2021-10-21 PROCEDURE — 4010F ACE/ARB THERAPY RXD/TAKEN: CPT | Mod: CPTII,NTX,S$GLB, | Performed by: UROLOGY

## 2021-10-21 PROCEDURE — 3077F PR MOST RECENT SYSTOLIC BLOOD PRESSURE >= 140 MM HG: ICD-10-PCS | Mod: CPTII,NTX,S$GLB, | Performed by: UROLOGY

## 2021-10-21 PROCEDURE — 99999 PR PBB SHADOW E&M-EST. PATIENT-LVL IV: ICD-10-PCS | Mod: PBBFAC,TXP,, | Performed by: UROLOGY

## 2021-10-21 PROCEDURE — 3288F PR FALLS RISK ASSESSMENT DOCUMENTED: ICD-10-PCS | Mod: CPTII,NTX,S$GLB, | Performed by: UROLOGY

## 2021-10-21 PROCEDURE — 1101F PT FALLS ASSESS-DOCD LE1/YR: CPT | Mod: CPTII,NTX,S$GLB, | Performed by: UROLOGY

## 2021-10-21 PROCEDURE — 3078F DIAST BP <80 MM HG: CPT | Mod: CPTII,NTX,S$GLB, | Performed by: UROLOGY

## 2021-10-21 PROCEDURE — 3061F PR NEG MICROALBUMINURIA RESULT DOCUMENTED/REVIEW: ICD-10-PCS | Mod: CPTII,NTX,S$GLB, | Performed by: UROLOGY

## 2021-10-21 PROCEDURE — 1101F PR PT FALLS ASSESS DOC 0-1 FALLS W/OUT INJ PAST YR: ICD-10-PCS | Mod: CPTII,NTX,S$GLB, | Performed by: UROLOGY

## 2021-10-21 PROCEDURE — 3008F BODY MASS INDEX DOCD: CPT | Mod: CPTII,NTX,S$GLB, | Performed by: UROLOGY

## 2021-10-21 PROCEDURE — 74018 RADEX ABDOMEN 1 VIEW: CPT | Mod: 26,NTX,, | Performed by: RADIOLOGY

## 2021-10-21 PROCEDURE — 3077F SYST BP >= 140 MM HG: CPT | Mod: CPTII,NTX,S$GLB, | Performed by: UROLOGY

## 2021-10-21 PROCEDURE — 3051F PR MOST RECENT HEMOGLOBIN A1C LEVEL 7.0 - < 8.0%: ICD-10-PCS | Mod: CPTII,NTX,S$GLB, | Performed by: UROLOGY

## 2021-10-21 PROCEDURE — 3288F FALL RISK ASSESSMENT DOCD: CPT | Mod: CPTII,NTX,S$GLB, | Performed by: UROLOGY

## 2021-10-21 PROCEDURE — 3066F NEPHROPATHY DOC TX: CPT | Mod: CPTII,NTX,S$GLB, | Performed by: UROLOGY

## 2021-10-26 ENCOUNTER — LAB VISIT (OUTPATIENT)
Dept: LAB | Facility: HOSPITAL | Age: 66
End: 2021-10-26
Attending: INTERNAL MEDICINE
Payer: MEDICARE

## 2021-10-26 DIAGNOSIS — I48.19 PERSISTENT ATRIAL FIBRILLATION: ICD-10-CM

## 2021-10-26 DIAGNOSIS — Z79.01 CURRENT USE OF ANTICOAGULANT THERAPY: ICD-10-CM

## 2021-10-26 LAB
INR PPP: 2.7 (ref 0.8–1.2)
PROTHROMBIN TIME: 28.2 SEC (ref 9–12.5)

## 2021-10-26 PROCEDURE — 85610 PROTHROMBIN TIME: CPT | Mod: TXP | Performed by: INTERNAL MEDICINE

## 2021-10-26 PROCEDURE — 36415 COLL VENOUS BLD VENIPUNCTURE: CPT | Mod: PN,NTX | Performed by: INTERNAL MEDICINE

## 2021-10-27 ENCOUNTER — PATIENT MESSAGE (OUTPATIENT)
Dept: UROLOGY | Facility: CLINIC | Age: 66
End: 2021-10-27
Payer: MEDICARE

## 2021-10-27 ENCOUNTER — ANTI-COAG VISIT (OUTPATIENT)
Dept: CARDIOLOGY | Facility: CLINIC | Age: 66
End: 2021-10-27
Payer: MEDICARE

## 2021-10-27 DIAGNOSIS — Z01.818 PRE-TRANSPLANT EVALUATION FOR LIVER TRANSPLANT: ICD-10-CM

## 2021-10-27 DIAGNOSIS — I48.19 PERSISTENT ATRIAL FIBRILLATION: Primary | ICD-10-CM

## 2021-10-27 DIAGNOSIS — Z79.01 CURRENT USE OF ANTICOAGULANT THERAPY: ICD-10-CM

## 2021-10-27 PROCEDURE — 93793 PR ANTICOAGULANT MGMT FOR PT TAKING WARFARIN: ICD-10-PCS | Mod: S$GLB,,,

## 2021-10-27 PROCEDURE — 93793 ANTICOAG MGMT PT WARFARIN: CPT | Mod: S$GLB,,,

## 2021-11-04 ENCOUNTER — TELEPHONE (OUTPATIENT)
Dept: TRANSPLANT | Facility: CLINIC | Age: 66
End: 2021-11-04
Payer: MEDICARE

## 2021-11-10 ENCOUNTER — HOSPITAL ENCOUNTER (OUTPATIENT)
Dept: RADIOLOGY | Facility: HOSPITAL | Age: 66
Discharge: HOME | End: 2021-11-10
Attending: INTERNAL MEDICINE
Payer: MEDICARE

## 2021-11-10 DIAGNOSIS — C24.9 MALIGNANT NEOPLASM OF BILIARY TRACT, UNSPECIFIED: ICD-10-CM

## 2021-11-10 LAB
CREAT SERPL-MCNC: 0.9 MG/DL (ref 0.5–1.4)
SAMPLE: NORMAL

## 2021-11-10 PROCEDURE — 25500020 PHARM REV CODE 255: Mod: TXP | Performed by: INTERNAL MEDICINE

## 2021-11-10 PROCEDURE — 74160 CT ABDOMEN W/CONTRAST: CPT | Mod: 26,TXP,, | Performed by: RADIOLOGY

## 2021-11-10 PROCEDURE — 74160 CT ABDOMEN W/CONTRAST: CPT | Mod: TC,TXP

## 2021-11-10 PROCEDURE — 74160 CT ABDOMEN WITH CONTRAST: ICD-10-PCS | Mod: 26,TXP,, | Performed by: RADIOLOGY

## 2021-11-10 RX ADMIN — IOHEXOL 100 ML: 350 INJECTION, SOLUTION INTRAVENOUS at 01:11

## 2021-11-11 ENCOUNTER — TELEPHONE (OUTPATIENT)
Dept: RESEARCH | Facility: HOSPITAL | Age: 66
End: 2021-11-11
Payer: MEDICARE

## 2021-11-11 ENCOUNTER — OFFICE VISIT (OUTPATIENT)
Dept: HEPATOLOGY | Facility: CLINIC | Age: 66
End: 2021-11-11
Payer: MEDICARE

## 2021-11-11 VITALS
RESPIRATION RATE: 17 BRPM | DIASTOLIC BLOOD PRESSURE: 65 MMHG | SYSTOLIC BLOOD PRESSURE: 154 MMHG | WEIGHT: 229.06 LBS | OXYGEN SATURATION: 97 % | HEART RATE: 58 BPM | HEIGHT: 72 IN | BODY MASS INDEX: 31.03 KG/M2 | TEMPERATURE: 98 F

## 2021-11-11 DIAGNOSIS — Z76.82 AWAITING LIVER TRANSPLANT: ICD-10-CM

## 2021-11-11 DIAGNOSIS — C22.0 HCC (HEPATOCELLULAR CARCINOMA): Primary | ICD-10-CM

## 2021-11-11 DIAGNOSIS — K74.69 OTHER CIRRHOSIS OF LIVER: ICD-10-CM

## 2021-11-11 DIAGNOSIS — K75.81 NASH (NONALCOHOLIC STEATOHEPATITIS): ICD-10-CM

## 2021-11-11 DIAGNOSIS — G47.00 INSOMNIA, UNSPECIFIED TYPE: ICD-10-CM

## 2021-11-11 DIAGNOSIS — K76.6 PORTAL HYPERTENSION: ICD-10-CM

## 2021-11-11 PROCEDURE — 99214 OFFICE O/P EST MOD 30 MIN: CPT | Mod: S$GLB,TXP,, | Performed by: INTERNAL MEDICINE

## 2021-11-11 PROCEDURE — 3288F PR FALLS RISK ASSESSMENT DOCUMENTED: ICD-10-PCS | Mod: CPTII,S$GLB,TXP, | Performed by: INTERNAL MEDICINE

## 2021-11-11 PROCEDURE — 1126F PR PAIN SEVERITY QUANTIFIED, NO PAIN PRESENT: ICD-10-PCS | Mod: CPTII,S$GLB,TXP, | Performed by: INTERNAL MEDICINE

## 2021-11-11 PROCEDURE — 3078F PR MOST RECENT DIASTOLIC BLOOD PRESSURE < 80 MM HG: ICD-10-PCS | Mod: CPTII,S$GLB,TXP, | Performed by: INTERNAL MEDICINE

## 2021-11-11 PROCEDURE — 3008F BODY MASS INDEX DOCD: CPT | Mod: CPTII,S$GLB,TXP, | Performed by: INTERNAL MEDICINE

## 2021-11-11 PROCEDURE — 1126F AMNT PAIN NOTED NONE PRSNT: CPT | Mod: CPTII,S$GLB,TXP, | Performed by: INTERNAL MEDICINE

## 2021-11-11 PROCEDURE — 99999 PR PBB SHADOW E&M-EST. PATIENT-LVL V: ICD-10-PCS | Mod: PBBFAC,TXP,, | Performed by: INTERNAL MEDICINE

## 2021-11-11 PROCEDURE — 3078F DIAST BP <80 MM HG: CPT | Mod: CPTII,S$GLB,TXP, | Performed by: INTERNAL MEDICINE

## 2021-11-11 PROCEDURE — 99214 PR OFFICE/OUTPT VISIT, EST, LEVL IV, 30-39 MIN: ICD-10-PCS | Mod: S$GLB,TXP,, | Performed by: INTERNAL MEDICINE

## 2021-11-11 PROCEDURE — 3288F FALL RISK ASSESSMENT DOCD: CPT | Mod: CPTII,S$GLB,TXP, | Performed by: INTERNAL MEDICINE

## 2021-11-11 PROCEDURE — 3077F SYST BP >= 140 MM HG: CPT | Mod: CPTII,S$GLB,TXP, | Performed by: INTERNAL MEDICINE

## 2021-11-11 PROCEDURE — 1101F PR PT FALLS ASSESS DOC 0-1 FALLS W/OUT INJ PAST YR: ICD-10-PCS | Mod: CPTII,S$GLB,TXP, | Performed by: INTERNAL MEDICINE

## 2021-11-11 PROCEDURE — 3077F PR MOST RECENT SYSTOLIC BLOOD PRESSURE >= 140 MM HG: ICD-10-PCS | Mod: CPTII,S$GLB,TXP, | Performed by: INTERNAL MEDICINE

## 2021-11-11 PROCEDURE — 3008F PR BODY MASS INDEX (BMI) DOCUMENTED: ICD-10-PCS | Mod: CPTII,S$GLB,TXP, | Performed by: INTERNAL MEDICINE

## 2021-11-11 PROCEDURE — 99999 PR PBB SHADOW E&M-EST. PATIENT-LVL V: CPT | Mod: PBBFAC,TXP,, | Performed by: INTERNAL MEDICINE

## 2021-11-11 PROCEDURE — 1101F PT FALLS ASSESS-DOCD LE1/YR: CPT | Mod: CPTII,S$GLB,TXP, | Performed by: INTERNAL MEDICINE

## 2021-11-11 RX ORDER — ZOLPIDEM TARTRATE 5 MG/1
5 TABLET ORAL NIGHTLY PRN
Qty: 30 TABLET | Refills: 0 | Status: ON HOLD | OUTPATIENT
Start: 2021-11-11 | End: 2022-01-13 | Stop reason: HOSPADM

## 2021-11-11 NOTE — PROGRESS NOTES
HEPATOLOGY FOLLOW UP    Referring Physician: Thais Maxwell MD  Current Corresponding Physician: Thais Maxwell MD    Tej Purdy is here for follow up of HCC    HPI   Tej Purdy is now listed for liver transplant with a MELD fo 17 (9/29/21- expires 12/28/21); current MELD 15; MELD exception for HCC pending (due Mar 4th, 2022). He underwent tx for HCC (3.5 cm hypervascular mass in segment 5/6):    HCC Tx:   TACE 12/29/20  RFA 12/30/20  He was admitted shortly after the procedure for pain and leukocytosis and placed on IV abx. Ct scan revealed: Sizeable area of heterogeneous attenuation mostly hypoattenuation within the right hepatic lobe measuring approximately 13.7 x 6.1 x 7.9 cm, the treated area. This was not sampled but concerning for possible evolving abscess/source of infection. He was discharged on a prolonged course of abx and did not develop an abscess.   CT abdomen 11/10/21: Stable post treatment changes of hepatic cm and VI.  No new enhancement.  No new enhancing hepatic lesions elsewhere.  CT chest 8/11/21: no netastases  AFP 11/10/21: 3.1    Cardiac issues:  New onset AFIB with RVR: Patient presented for a RHC on 5/7/2021 and a pre procedure ECG showed AFIB with RVR. This was a new diagnosis for him and he appeared to be fairly asymptomatic. Reported mild dizziness which was was not sure had any associated with afib. He was subsequently started on metoprolol 25 mg  ER daily and apixaban 5 mg bid and referred to arrhythmia clinic. Had successful cardioverion 5/19/21; now on amiodarone and   Admission 5/21/21-5/24/21 for hypertensive emergency and acute respiratory failure form pulmonary edema: Hypoxemic on arrival with SpO2 50%, started on NRB then transitioned to CPAP. Chest imaging revealed diffuse pulmonary edema. Exam with diffuse crackles. CTA- negative for clot, diffuse GGOs and small bilateral pleural effusions; Echo - bedside normal EF, no effusions; ECG sinus tachycardia, left  axis deviation; Troponin <0.05; . IV lasix and CPAP started  Cardioversion 7/27/21: had cardioversion; one week later - ekg shows pt has sinus bradycardia; due to f/u one month after cardioversion with cardiology; on amiodarone and warfarin    Ureteral stone: 9/24/21: Dx w/o bstructing kidney stone- passed spontaneously- not seen on xray done 10/21/21; cleared for liver tx    He feels well. He denies fevers or chills. No SOB  Ascites: none  HE: on rifaximin and lactulose, improved    Esophageal varices: None on EGD 7/21    MELD-Na score: 15 at 11/10/2021 11:45 AM  MELD score: 15 at 11/10/2021 11:45 AM  Calculated from:  Serum Creatinine: 0.9 mg/dL (Using min of 1 mg/dL) at 11/10/2021 11:45 AM  Serum Sodium: 140 mmol/L (Using max of 137 mmol/L) at 11/10/2021 11:45 AM  Total Bilirubin: 0.7 mg/dL (Using min of 1 mg/dL) at 11/10/2021 11:45 AM  INR(ratio): 2.1 at 11/10/2021 11:45 AM  Age: 66 years    Outpatient Encounter Medications as of 11/11/2021   Medication Sig Dispense Refill    amiodarone (PACERONE) 200 MG Tab Take 2 tablets (400 mg total) by mouth 2 (two) times daily for 14 days, THEN 1 tablet (200 mg total) once daily. 146 tablet 0    aspirin (ECOTRIN) 81 MG EC tablet Take 81 mg by mouth once daily.      blood sugar diagnostic Strp To check BG 2 times daily, to use with insurance preferred meter 100 strip 11    blood-glucose meter kit To check BG 2 times daily, to use with insurance preferred meter 1 each 0    gabapentin (NEURONTIN) 300 MG capsule Take 1 capsule (300 mg total) by mouth 3 (three) times daily as needed (neuropathy). 90 capsule 5    insulin (LANTUS SOLOSTAR U-100 INSULIN) glargine 100 units/mL (3mL) SubQ pen Inject 25 Units into the skin every evening. 1 Box 1    lactulose (CHRONULAC) 10 gram/15 mL solution Take 30 g by mouth 3 (three) times daily.       lancets Misc To check BG 2 times daily, to use with insurance preferred meter 100 each 11    lisinopriL (PRINIVIL,ZESTRIL) 20 MG  tablet Take 20 mg by mouth once daily.      lovastatin (MEVACOR) 20 MG tablet Take 20 mg by mouth every evening.       metFORMIN (GLUCOPHAGE) 1000 MG tablet Take 1,000 mg by mouth 2 (two) times daily with meals.       metoprolol succinate (TOPROL-XL) 25 MG 24 hr tablet Take 2 tablets (50 mg total) by mouth once daily. 60 tablet 0    multivitamin capsule Take 1 capsule by mouth once daily.      omega-3 fatty acids/fish oil (FISH OIL-OMEGA-3 FATTY ACIDS) 300-1,000 mg capsule Take 1 capsule by mouth once daily.       oxyCODONE-acetaminophen (PERCOCET) 5-325 mg per tablet Take 1 tablet by mouth every 4 (four) hours as needed for Pain. 18 tablet 0    pantoprazole (PROTONIX) 20 MG tablet Take 1 tablet (20 mg total) by mouth once daily. 30 tablet 3    rifAXIMin (XIFAXAN) 550 mg Tab Take 1 tablet (550 mg total) by mouth 2 (two) times daily. 60 tablet 11    sertraline (ZOLOFT) 25 MG tablet Take 25 mg by mouth once daily.       tamsulosin (FLOMAX) 0.4 mg Cap Take 1 capsule (0.4 mg total) by mouth every evening. 30 capsule 1    warfarin (COUMADIN) 5 MG tablet Take 1 tablet (5 mg total) by mouth Daily. DO NOT START UNTIL DIRECTED BY COUMADIN CLINIC 30 tablet 11    zolpidem (AMBIEN) 5 MG Tab Take 1 tablet (5 mg total) by mouth nightly as needed (insomnia). 30 tablet 0     Facility-Administered Encounter Medications as of 11/11/2021   Medication Dose Route Frequency Provider Last Rate Last Admin    sodium chloride 0.9% bolus 1,000 mL  1,000 mL Intravenous Once Solange Bill NP         Review of patient's allergies indicates:   Allergen Reactions    Bee pollens Swelling     BEE STINGS swells body     Past Medical History:   Diagnosis Date    Cirrhosis 11/23/2020    Diabetes mellitus, type 2     Diabetic neuropathy 11/24/2020    Disorder of kidney and ureter     HTN (hypertension) 11/23/2020    Hyperlipidemia 11/23/2020    Kidney stones 11/23/2020    S/p lithotripsy 2020    Leukocytosis 1/15/2021    Liver  mass 11/23/2020    CASTILLO (nonalcoholic steatohepatitis) 11/23/2020    PAD (peripheral artery disease)     Portal hypertension 11/23/2020    Skin cancer 11/23/2020       Review of Systems   Constitutional: Negative.    HENT: Negative.    Eyes: Negative.    Respiratory: Negative.    Cardiovascular: Negative.    Gastrointestinal: Negative.    Genitourinary: Negative.    Musculoskeletal: Negative.    Skin: Negative.    Neurological: Negative.    Psychiatric/Behavioral: Negative.      Vitals:    11/11/21 1002   BP: (!) 154/65   Pulse: (!) 58   Resp: 17   Temp: 97.8 °F (36.6 °C)       Physical Exam  Vitals reviewed.   Constitutional:       Appearance: He is well-developed.   HENT:      Head: Normocephalic and atraumatic.   Eyes:      General: No scleral icterus.     Conjunctiva/sclera: Conjunctivae normal.      Pupils: Pupils are equal, round, and reactive to light.   Neck:      Thyroid: No thyromegaly.   Cardiovascular:      Rate and Rhythm: Normal rate and regular rhythm.      Heart sounds: Normal heart sounds.   Pulmonary:      Effort: Pulmonary effort is normal.      Breath sounds: Normal breath sounds. No rales.   Abdominal:      General: Bowel sounds are normal. There is no distension.      Palpations: Abdomen is soft. There is no mass.      Tenderness: There is no abdominal tenderness.   Musculoskeletal:         General: Normal range of motion.      Cervical back: Normal range of motion and neck supple.   Skin:     General: Skin is warm and dry.      Findings: No rash.   Neurological:      Mental Status: He is alert and oriented to person, place, and time.         MELD-Na score: 15 at 11/10/2021 11:45 AM  MELD score: 15 at 11/10/2021 11:45 AM  Calculated from:  Serum Creatinine: 0.9 mg/dL (Using min of 1 mg/dL) at 11/10/2021 11:45 AM  Serum Sodium: 140 mmol/L (Using max of 137 mmol/L) at 11/10/2021 11:45 AM  Total Bilirubin: 0.7 mg/dL (Using min of 1 mg/dL) at 11/10/2021 11:45 AM  INR(ratio): 2.1 at 11/10/2021  11:45 AM  Age: 66 years    Lab Results   Component Value Date     11/10/2021    BUN 19 11/10/2021    CREATININE 0.9 11/10/2021    CALCIUM 9.3 11/10/2021     11/10/2021    K 4.4 11/10/2021     11/10/2021    PROT 7.1 11/10/2021    CO2 29 11/10/2021    ANIONGAP 10 11/10/2021    WBC 7.24 11/10/2021    RBC 5.08 11/10/2021    HGB 14.9 11/10/2021    HCT 47.2 11/10/2021    HCT 47 09/24/2021    MCV 93 11/10/2021    MCH 29.3 11/10/2021    MCHC 31.6 (L) 11/10/2021     Lab Results   Component Value Date    RDW 13.7 11/10/2021     (L) 11/10/2021    MPV 10.8 11/10/2021    GRAN 4.9 11/10/2021    GRAN 67.8 11/10/2021    LYMPH 1.4 11/10/2021    LYMPH 18.6 11/10/2021    MONO 0.7 11/10/2021    MONO 9.3 11/10/2021    EOSINOPHIL 2.8 11/10/2021    BASOPHIL 0.7 11/10/2021    EOS 0.2 11/10/2021    BASO 0.05 11/10/2021    APTT 30.5 06/29/2021    GROUPTRH O NEG 02/25/2021     (H) 06/08/2021    CHOL 133 06/29/2021    TRIG 159 (H) 06/29/2021    HDL 32 (L) 06/29/2021    CHOLHDL 24.1 06/29/2021    TOTALCHOLEST 4.2 06/29/2021    ALBUMIN 3.9 11/10/2021    BILIDIR 0.2 11/10/2021    AST 30 11/10/2021    ALT 28 11/10/2021    ALKPHOS 63 11/10/2021    MG 1.5 (L) 06/09/2021    LABPROT 22.0 (H) 11/10/2021    INR 2.1 (H) 11/10/2021    PSA 0.48 02/25/2021       Assessment and Plan:  Tej Purdy is a 66 y.o. male with compensated cirrhosis and HCC s/p TACE and RFA Current recommendations:  1 . HCC: s/p TACE and RFA-f/u imaging stable after procedures; surveillance for tumor every 3 months; ct chest every 6 months  2. Compensated cirrhosis:  monitor meld labs every 8 weeks;   3. HE: continue lactulose and rifaximin  4. AFIB: now s/p cardioversion on amiodarone and coumadin-cleared for liver tx  5. Ureteral stone- cleared; monitor    Return 12 weeks

## 2021-11-11 NOTE — PATIENT INSTRUCTIONS
1. Repeat ct abdo in 3 mos. Will do ct chest at that time as well  2. Will clarify use of amiodarone  3. Continue warfarin  Return 12 weeks

## 2021-11-12 ENCOUNTER — TELEPHONE (OUTPATIENT)
Dept: TRANSPLANT | Facility: CLINIC | Age: 66
End: 2021-11-12
Payer: MEDICARE

## 2021-11-12 ENCOUNTER — PATIENT MESSAGE (OUTPATIENT)
Dept: TRANSPLANT | Facility: CLINIC | Age: 66
End: 2021-11-12
Payer: MEDICARE

## 2021-11-14 DIAGNOSIS — I48.19 PERSISTENT ATRIAL FIBRILLATION: Primary | ICD-10-CM

## 2021-11-14 RX ORDER — AMIODARONE HYDROCHLORIDE 200 MG/1
200 TABLET ORAL DAILY
Qty: 30 TABLET | Refills: 11 | Status: ON HOLD | OUTPATIENT
Start: 2021-11-14 | End: 2022-01-13 | Stop reason: SDUPTHER

## 2021-11-16 ENCOUNTER — LAB VISIT (OUTPATIENT)
Dept: LAB | Facility: HOSPITAL | Age: 66
End: 2021-11-16
Attending: INTERNAL MEDICINE
Payer: MEDICARE

## 2021-11-16 ENCOUNTER — ANTI-COAG VISIT (OUTPATIENT)
Dept: CARDIOLOGY | Facility: CLINIC | Age: 66
End: 2021-11-16
Payer: MEDICARE

## 2021-11-16 DIAGNOSIS — I48.19 PERSISTENT ATRIAL FIBRILLATION: ICD-10-CM

## 2021-11-16 DIAGNOSIS — C24.9 MALIGNANT NEOPLASM OF BILIARY TRACT, UNSPECIFIED: ICD-10-CM

## 2021-11-16 DIAGNOSIS — Z79.01 CURRENT USE OF ANTICOAGULANT THERAPY: Primary | ICD-10-CM

## 2021-11-16 DIAGNOSIS — Z01.818 PRE-TRANSPLANT EVALUATION FOR LIVER TRANSPLANT: ICD-10-CM

## 2021-11-16 DIAGNOSIS — R91.8 OTHER NONSPECIFIC ABNORMAL FINDING OF LUNG FIELD: ICD-10-CM

## 2021-11-16 LAB
INR PPP: 1.8 (ref 0.8–1.2)
PROTHROMBIN TIME: 18.8 SEC (ref 9–12.5)

## 2021-11-16 PROCEDURE — 36415 COLL VENOUS BLD VENIPUNCTURE: CPT | Mod: PN,NTX | Performed by: INTERNAL MEDICINE

## 2021-11-16 PROCEDURE — 93793 PR ANTICOAGULANT MGMT FOR PT TAKING WARFARIN: ICD-10-PCS | Mod: S$GLB,,,

## 2021-11-16 PROCEDURE — 93793 ANTICOAG MGMT PT WARFARIN: CPT | Mod: S$GLB,,,

## 2021-11-16 PROCEDURE — 85610 PROTHROMBIN TIME: CPT | Mod: TXP | Performed by: INTERNAL MEDICINE

## 2021-11-16 RX ORDER — WARFARIN SODIUM 5 MG/1
TABLET ORAL
Qty: 45 TABLET | Refills: 5 | Status: ON HOLD | OUTPATIENT
Start: 2021-11-16 | End: 2022-01-13 | Stop reason: HOSPADM

## 2021-11-17 ENCOUNTER — OFFICE VISIT (OUTPATIENT)
Dept: ENDOCRINOLOGY | Facility: CLINIC | Age: 66
End: 2021-11-17
Payer: MEDICARE

## 2021-11-17 VITALS
BODY MASS INDEX: 31.12 KG/M2 | DIASTOLIC BLOOD PRESSURE: 54 MMHG | HEIGHT: 72 IN | WEIGHT: 229.75 LBS | HEART RATE: 68 BPM | SYSTOLIC BLOOD PRESSURE: 118 MMHG

## 2021-11-17 DIAGNOSIS — E78.2 MIXED HYPERLIPIDEMIA: ICD-10-CM

## 2021-11-17 DIAGNOSIS — E66.9 CLASS 1 OBESITY WITH SERIOUS COMORBIDITY AND BODY MASS INDEX (BMI) OF 31.0 TO 31.9 IN ADULT, UNSPECIFIED OBESITY TYPE: ICD-10-CM

## 2021-11-17 DIAGNOSIS — E11.65 TYPE 2 DIABETES MELLITUS WITH HYPERGLYCEMIA, WITH LONG-TERM CURRENT USE OF INSULIN: ICD-10-CM

## 2021-11-17 DIAGNOSIS — Z79.4 TYPE 2 DIABETES MELLITUS WITH HYPERGLYCEMIA, WITH LONG-TERM CURRENT USE OF INSULIN: ICD-10-CM

## 2021-11-17 DIAGNOSIS — E11.42 DIABETIC POLYNEUROPATHY ASSOCIATED WITH TYPE 2 DIABETES MELLITUS: ICD-10-CM

## 2021-11-17 DIAGNOSIS — I10 PRIMARY HYPERTENSION: ICD-10-CM

## 2021-11-17 PROBLEM — E66.811 CLASS 1 OBESITY WITH SERIOUS COMORBIDITY AND BODY MASS INDEX (BMI) OF 31.0 TO 31.9 IN ADULT: Status: ACTIVE | Noted: 2021-02-03

## 2021-11-17 PROCEDURE — 4010F PR ACE/ARB THEARPY RXD/TAKEN: ICD-10-PCS | Mod: CPTII,NTX,S$GLB, | Performed by: INTERNAL MEDICINE

## 2021-11-17 PROCEDURE — 1159F MED LIST DOCD IN RCRD: CPT | Mod: CPTII,NTX,S$GLB, | Performed by: INTERNAL MEDICINE

## 2021-11-17 PROCEDURE — 3061F PR NEG MICROALBUMINURIA RESULT DOCUMENTED/REVIEW: ICD-10-PCS | Mod: CPTII,NTX,S$GLB, | Performed by: INTERNAL MEDICINE

## 2021-11-17 PROCEDURE — 3044F PR MOST RECENT HEMOGLOBIN A1C LEVEL <7.0%: ICD-10-PCS | Mod: CPTII,NTX,S$GLB, | Performed by: INTERNAL MEDICINE

## 2021-11-17 PROCEDURE — 1159F PR MEDICATION LIST DOCUMENTED IN MEDICAL RECORD: ICD-10-PCS | Mod: CPTII,NTX,S$GLB, | Performed by: INTERNAL MEDICINE

## 2021-11-17 PROCEDURE — 1101F PT FALLS ASSESS-DOCD LE1/YR: CPT | Mod: CPTII,NTX,S$GLB, | Performed by: INTERNAL MEDICINE

## 2021-11-17 PROCEDURE — 4010F ACE/ARB THERAPY RXD/TAKEN: CPT | Mod: CPTII,NTX,S$GLB, | Performed by: INTERNAL MEDICINE

## 2021-11-17 PROCEDURE — 3074F SYST BP LT 130 MM HG: CPT | Mod: CPTII,NTX,S$GLB, | Performed by: INTERNAL MEDICINE

## 2021-11-17 PROCEDURE — 3074F PR MOST RECENT SYSTOLIC BLOOD PRESSURE < 130 MM HG: ICD-10-PCS | Mod: CPTII,NTX,S$GLB, | Performed by: INTERNAL MEDICINE

## 2021-11-17 PROCEDURE — 1126F PR PAIN SEVERITY QUANTIFIED, NO PAIN PRESENT: ICD-10-PCS | Mod: CPTII,NTX,S$GLB, | Performed by: INTERNAL MEDICINE

## 2021-11-17 PROCEDURE — 3288F FALL RISK ASSESSMENT DOCD: CPT | Mod: CPTII,NTX,S$GLB, | Performed by: INTERNAL MEDICINE

## 2021-11-17 PROCEDURE — 3078F PR MOST RECENT DIASTOLIC BLOOD PRESSURE < 80 MM HG: ICD-10-PCS | Mod: CPTII,NTX,S$GLB, | Performed by: INTERNAL MEDICINE

## 2021-11-17 PROCEDURE — 1101F PR PT FALLS ASSESS DOC 0-1 FALLS W/OUT INJ PAST YR: ICD-10-PCS | Mod: CPTII,NTX,S$GLB, | Performed by: INTERNAL MEDICINE

## 2021-11-17 PROCEDURE — 1160F PR REVIEW ALL MEDS BY PRESCRIBER/CLIN PHARMACIST DOCUMENTED: ICD-10-PCS | Mod: CPTII,NTX,S$GLB, | Performed by: INTERNAL MEDICINE

## 2021-11-17 PROCEDURE — 3288F PR FALLS RISK ASSESSMENT DOCUMENTED: ICD-10-PCS | Mod: CPTII,NTX,S$GLB, | Performed by: INTERNAL MEDICINE

## 2021-11-17 PROCEDURE — 99214 PR OFFICE/OUTPT VISIT, EST, LEVL IV, 30-39 MIN: ICD-10-PCS | Mod: NTX,S$GLB,, | Performed by: INTERNAL MEDICINE

## 2021-11-17 PROCEDURE — 3008F BODY MASS INDEX DOCD: CPT | Mod: CPTII,NTX,S$GLB, | Performed by: INTERNAL MEDICINE

## 2021-11-17 PROCEDURE — 3044F HG A1C LEVEL LT 7.0%: CPT | Mod: CPTII,NTX,S$GLB, | Performed by: INTERNAL MEDICINE

## 2021-11-17 PROCEDURE — 3066F NEPHROPATHY DOC TX: CPT | Mod: CPTII,NTX,S$GLB, | Performed by: INTERNAL MEDICINE

## 2021-11-17 PROCEDURE — 3066F PR DOCUMENTATION OF TREATMENT FOR NEPHROPATHY: ICD-10-PCS | Mod: CPTII,NTX,S$GLB, | Performed by: INTERNAL MEDICINE

## 2021-11-17 PROCEDURE — 1160F RVW MEDS BY RX/DR IN RCRD: CPT | Mod: CPTII,NTX,S$GLB, | Performed by: INTERNAL MEDICINE

## 2021-11-17 PROCEDURE — 99214 OFFICE O/P EST MOD 30 MIN: CPT | Mod: NTX,S$GLB,, | Performed by: INTERNAL MEDICINE

## 2021-11-17 PROCEDURE — 3008F PR BODY MASS INDEX (BMI) DOCUMENTED: ICD-10-PCS | Mod: CPTII,NTX,S$GLB, | Performed by: INTERNAL MEDICINE

## 2021-11-17 PROCEDURE — 3061F NEG MICROALBUMINURIA REV: CPT | Mod: CPTII,NTX,S$GLB, | Performed by: INTERNAL MEDICINE

## 2021-11-17 PROCEDURE — 3078F DIAST BP <80 MM HG: CPT | Mod: CPTII,NTX,S$GLB, | Performed by: INTERNAL MEDICINE

## 2021-11-17 PROCEDURE — 1126F AMNT PAIN NOTED NONE PRSNT: CPT | Mod: CPTII,NTX,S$GLB, | Performed by: INTERNAL MEDICINE

## 2021-11-17 RX ORDER — INSULIN GLARGINE 100 [IU]/ML
22 INJECTION, SOLUTION SUBCUTANEOUS NIGHTLY
Qty: 15 ML | Refills: 11 | Status: ON HOLD | OUTPATIENT
Start: 2021-11-17 | End: 2022-01-13 | Stop reason: SDUPTHER

## 2021-11-17 RX ORDER — PEN NEEDLE, DIABETIC 30 GX3/16"
NEEDLE, DISPOSABLE MISCELLANEOUS
Qty: 100 EACH | Refills: 3 | Status: SHIPPED | OUTPATIENT
Start: 2021-11-17 | End: 2022-03-19 | Stop reason: SDUPTHER

## 2021-11-18 ENCOUNTER — TELEPHONE (OUTPATIENT)
Dept: TRANSPLANT | Facility: CLINIC | Age: 66
End: 2021-11-18
Payer: MEDICARE

## 2021-11-19 ENCOUNTER — PATIENT MESSAGE (OUTPATIENT)
Dept: ELECTROPHYSIOLOGY | Facility: CLINIC | Age: 66
End: 2021-11-19
Payer: MEDICARE

## 2021-11-23 ENCOUNTER — CONFERENCE (OUTPATIENT)
Dept: TRANSPLANT | Facility: CLINIC | Age: 66
End: 2021-11-23
Payer: MEDICARE

## 2021-11-30 ENCOUNTER — PATIENT MESSAGE (OUTPATIENT)
Dept: ENDOCRINOLOGY | Facility: CLINIC | Age: 66
End: 2021-11-30
Payer: MEDICARE

## 2021-11-30 ENCOUNTER — PATIENT MESSAGE (OUTPATIENT)
Dept: UROLOGY | Facility: CLINIC | Age: 66
End: 2021-11-30
Payer: MEDICARE

## 2021-11-30 ENCOUNTER — PATIENT MESSAGE (OUTPATIENT)
Dept: ELECTROPHYSIOLOGY | Facility: CLINIC | Age: 66
End: 2021-11-30
Payer: MEDICARE

## 2021-11-30 ENCOUNTER — LAB VISIT (OUTPATIENT)
Dept: LAB | Facility: HOSPITAL | Age: 66
End: 2021-11-30
Attending: INTERNAL MEDICINE
Payer: MEDICARE

## 2021-11-30 DIAGNOSIS — Z79.01 CURRENT USE OF ANTICOAGULANT THERAPY: ICD-10-CM

## 2021-11-30 DIAGNOSIS — I48.19 PERSISTENT ATRIAL FIBRILLATION: ICD-10-CM

## 2021-11-30 LAB
INR PPP: 1.3 (ref 0.8–1.2)
PROTHROMBIN TIME: 14.5 SEC (ref 9–12.5)

## 2021-11-30 PROCEDURE — 85610 PROTHROMBIN TIME: CPT | Mod: NTX | Performed by: INTERNAL MEDICINE

## 2021-11-30 PROCEDURE — 36415 COLL VENOUS BLD VENIPUNCTURE: CPT | Mod: PN,TXP | Performed by: INTERNAL MEDICINE

## 2021-11-30 RX ORDER — GABAPENTIN 300 MG/1
300 CAPSULE ORAL 3 TIMES DAILY PRN
Qty: 90 CAPSULE | Refills: 5 | Status: ON HOLD | OUTPATIENT
Start: 2021-11-30 | End: 2022-01-13 | Stop reason: SDUPTHER

## 2021-12-01 ENCOUNTER — ANTI-COAG VISIT (OUTPATIENT)
Dept: CARDIOLOGY | Facility: CLINIC | Age: 66
End: 2021-12-01
Payer: MEDICARE

## 2021-12-01 DIAGNOSIS — I48.19 PERSISTENT ATRIAL FIBRILLATION: Primary | ICD-10-CM

## 2021-12-01 DIAGNOSIS — Z79.01 CURRENT USE OF ANTICOAGULANT THERAPY: ICD-10-CM

## 2021-12-01 DIAGNOSIS — Z01.818 PRE-TRANSPLANT EVALUATION FOR LIVER TRANSPLANT: ICD-10-CM

## 2021-12-01 PROCEDURE — 93793 ANTICOAG MGMT PT WARFARIN: CPT | Mod: S$GLB,,,

## 2021-12-01 PROCEDURE — 93793 PR ANTICOAGULANT MGMT FOR PT TAKING WARFARIN: ICD-10-PCS | Mod: S$GLB,,,

## 2021-12-01 RX ORDER — METOPROLOL SUCCINATE 25 MG/1
50 TABLET, EXTENDED RELEASE ORAL DAILY
Qty: 180 TABLET | Refills: 3 | Status: ON HOLD | OUTPATIENT
Start: 2021-12-01 | End: 2022-01-13 | Stop reason: HOSPADM

## 2021-12-07 ENCOUNTER — LAB VISIT (OUTPATIENT)
Dept: LAB | Facility: HOSPITAL | Age: 66
End: 2021-12-07
Attending: INTERNAL MEDICINE
Payer: MEDICARE

## 2021-12-07 DIAGNOSIS — Z79.01 CURRENT USE OF ANTICOAGULANT THERAPY: ICD-10-CM

## 2021-12-07 DIAGNOSIS — I48.19 PERSISTENT ATRIAL FIBRILLATION: ICD-10-CM

## 2021-12-07 LAB
INR PPP: 1.3 (ref 0.8–1.2)
PROTHROMBIN TIME: 14.1 SEC (ref 9–12.5)

## 2021-12-07 PROCEDURE — 36415 COLL VENOUS BLD VENIPUNCTURE: CPT | Mod: PN,TXP | Performed by: INTERNAL MEDICINE

## 2021-12-07 PROCEDURE — 85610 PROTHROMBIN TIME: CPT | Mod: TXP | Performed by: INTERNAL MEDICINE

## 2021-12-08 ENCOUNTER — ANTI-COAG VISIT (OUTPATIENT)
Dept: CARDIOLOGY | Facility: CLINIC | Age: 66
End: 2021-12-08
Payer: MEDICARE

## 2021-12-08 DIAGNOSIS — Z79.01 CURRENT USE OF ANTICOAGULANT THERAPY: ICD-10-CM

## 2021-12-08 DIAGNOSIS — Z01.818 PRE-TRANSPLANT EVALUATION FOR LIVER TRANSPLANT: ICD-10-CM

## 2021-12-08 DIAGNOSIS — I48.19 PERSISTENT ATRIAL FIBRILLATION: Primary | ICD-10-CM

## 2021-12-08 PROCEDURE — 93793 PR ANTICOAGULANT MGMT FOR PT TAKING WARFARIN: ICD-10-PCS | Mod: S$GLB,,,

## 2021-12-08 PROCEDURE — 93793 ANTICOAG MGMT PT WARFARIN: CPT | Mod: S$GLB,,,

## 2021-12-14 ENCOUNTER — LAB VISIT (OUTPATIENT)
Dept: LAB | Facility: HOSPITAL | Age: 66
End: 2021-12-14
Attending: INTERNAL MEDICINE
Payer: MEDICARE

## 2021-12-14 DIAGNOSIS — Z79.01 CURRENT USE OF ANTICOAGULANT THERAPY: ICD-10-CM

## 2021-12-14 DIAGNOSIS — I48.19 PERSISTENT ATRIAL FIBRILLATION: ICD-10-CM

## 2021-12-14 LAB
INR PPP: 2.6 (ref 0.8–1.2)
PROTHROMBIN TIME: 26.7 SEC (ref 9–12.5)

## 2021-12-14 PROCEDURE — 36415 COLL VENOUS BLD VENIPUNCTURE: CPT | Mod: PN,TXP | Performed by: INTERNAL MEDICINE

## 2021-12-14 PROCEDURE — 85610 PROTHROMBIN TIME: CPT | Mod: TXP | Performed by: INTERNAL MEDICINE

## 2021-12-15 ENCOUNTER — ANTI-COAG VISIT (OUTPATIENT)
Dept: CARDIOLOGY | Facility: CLINIC | Age: 66
End: 2021-12-15
Payer: MEDICARE

## 2021-12-15 DIAGNOSIS — Z01.818 PRE-TRANSPLANT EVALUATION FOR LIVER TRANSPLANT: ICD-10-CM

## 2021-12-15 DIAGNOSIS — Z79.01 CURRENT USE OF ANTICOAGULANT THERAPY: ICD-10-CM

## 2021-12-15 DIAGNOSIS — I48.19 PERSISTENT ATRIAL FIBRILLATION: Primary | ICD-10-CM

## 2021-12-15 PROCEDURE — 93793 ANTICOAG MGMT PT WARFARIN: CPT | Mod: S$GLB,,,

## 2021-12-15 PROCEDURE — 93793 PR ANTICOAGULANT MGMT FOR PT TAKING WARFARIN: ICD-10-PCS | Mod: S$GLB,,,

## 2021-12-21 ENCOUNTER — LAB VISIT (OUTPATIENT)
Dept: LAB | Facility: HOSPITAL | Age: 66
End: 2021-12-21
Attending: INTERNAL MEDICINE
Payer: MEDICARE

## 2021-12-21 DIAGNOSIS — I48.19 PERSISTENT ATRIAL FIBRILLATION: ICD-10-CM

## 2021-12-21 DIAGNOSIS — Z79.01 CURRENT USE OF ANTICOAGULANT THERAPY: ICD-10-CM

## 2021-12-21 LAB
INR PPP: 3.7 (ref 0.8–1.2)
PROTHROMBIN TIME: 36.9 SEC (ref 9–12.5)

## 2021-12-21 PROCEDURE — 85610 PROTHROMBIN TIME: CPT | Mod: NTX | Performed by: INTERNAL MEDICINE

## 2021-12-21 PROCEDURE — 36415 COLL VENOUS BLD VENIPUNCTURE: CPT | Mod: PN,NTX | Performed by: INTERNAL MEDICINE

## 2021-12-22 ENCOUNTER — ANTI-COAG VISIT (OUTPATIENT)
Dept: CARDIOLOGY | Facility: CLINIC | Age: 66
End: 2021-12-22
Payer: MEDICARE

## 2021-12-22 ENCOUNTER — TELEPHONE (OUTPATIENT)
Dept: TRANSPLANT | Facility: CLINIC | Age: 66
End: 2021-12-22
Payer: MEDICARE

## 2021-12-22 DIAGNOSIS — Z79.01 CURRENT USE OF ANTICOAGULANT THERAPY: ICD-10-CM

## 2021-12-22 DIAGNOSIS — K75.81 NASH (NONALCOHOLIC STEATOHEPATITIS): ICD-10-CM

## 2021-12-22 DIAGNOSIS — I48.19 PERSISTENT ATRIAL FIBRILLATION: Primary | ICD-10-CM

## 2021-12-22 DIAGNOSIS — Z01.818 PRE-TRANSPLANT EVALUATION FOR LIVER TRANSPLANT: ICD-10-CM

## 2021-12-22 DIAGNOSIS — Z76.82 ORGAN TRANSPLANT CANDIDATE: Primary | ICD-10-CM

## 2021-12-22 PROCEDURE — 93793 PR ANTICOAGULANT MGMT FOR PT TAKING WARFARIN: ICD-10-PCS | Mod: S$GLB,,,

## 2021-12-22 PROCEDURE — 93793 ANTICOAG MGMT PT WARFARIN: CPT | Mod: S$GLB,,,

## 2021-12-24 ENCOUNTER — TELEPHONE (OUTPATIENT)
Dept: TRANSPLANT | Facility: CLINIC | Age: 66
End: 2021-12-24
Payer: MEDICARE

## 2021-12-24 ENCOUNTER — PATIENT MESSAGE (OUTPATIENT)
Dept: HEPATOLOGY | Facility: CLINIC | Age: 66
End: 2021-12-24
Payer: MEDICARE

## 2021-12-24 NOTE — TELEPHONE ENCOUNTER
"[facility] RISK CRITERIA BEHAVIOR DONOR ORGAN OFFER NOTE     Notified by Juanpablo Carreon, , of liver offer with donor information.  Donor and recipient information read back and verified.  Spoke with Tej Purdy and identified no acute medical issues during telephone assessment.      Patient verbalized understanding that he/she has been called as backup recipient.    The donor's reported social history includes PHS risk criteria:  Male to Male sex    The risk of getting HIV or other hepatitis viruses from this donor is very small compared to the risk of dying while waiting for another liver. The risk of clinical illness from any transmitted infection is also small due to the availability of highly effective oral drugs for all three of these viruses. While the patient has the right to decline any organ for any reason, available scientific data do not support turning down an organ based on Hepatitis C or behavioral risk factors as the risks associated with waiting longer for the transplant are many times greater. Informed patient that Dr. Lozada thinks that this is a good liver for the patient.     Patient was also informed that if he turned down the offer, "A transplant doctor will call you after we hang up". Patient was also informed that if he turned down this donor, he would not be punished or removed from the transplant list, that he would remain on the list at his current status/MELD score. However, by waiting on the list longer rather than receiving this transplant, he will face a greater risk of death than any risk from the slight possibility of transmitted infection.    Patient was also informed that he would have the opportunity to see and talk to the surgeon when he got to the hospital and before he went down to the operating room.    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 Once a recipient is transplanted with an organ " "from a recipient who is + for syphilis, penicillin treatment will begin as syphilis is curable.   Mr. Purdy then began to ask if he turned the organ down would he be placed lower on the list? Would it count against him.  This coordinator explained that his MELD was 17 and that would not change.    Mr. Purdy stated, "I just don't think I want an organ from someone who had syphilis"  His significant other asked, "Why is the donor going to the operating room? Are they dead? Why are they sick?"   This coordinator explained the reason for the donor's illness could not be divulged. And explained more that organs are not taken from people who are well and must be removed in the operating room.   Mr. Purdy then verbalized concern about HIV.    He was reassured that all testing done on the donor did not reveal HIV.  Nor did it reveal Hep B or Hep C.   He was further informed that on his profile it stated that he would accept organs that were Hep C and Hep B +.   Explained that it may be a good idea for him to see his hepatologist for an appt soon to discuss his concerns and would send request in EPIC    "

## 2021-12-28 ENCOUNTER — LAB VISIT (OUTPATIENT)
Dept: LAB | Facility: HOSPITAL | Age: 66
End: 2021-12-28
Attending: INTERNAL MEDICINE
Payer: MEDICARE

## 2021-12-28 ENCOUNTER — ANTI-COAG VISIT (OUTPATIENT)
Dept: CARDIOLOGY | Facility: CLINIC | Age: 66
End: 2021-12-28
Payer: MEDICARE

## 2021-12-28 ENCOUNTER — TELEPHONE (OUTPATIENT)
Dept: HEPATOLOGY | Facility: CLINIC | Age: 66
End: 2021-12-28
Payer: MEDICARE

## 2021-12-28 DIAGNOSIS — Z79.01 CURRENT USE OF ANTICOAGULANT THERAPY: ICD-10-CM

## 2021-12-28 DIAGNOSIS — Z01.818 PRE-TRANSPLANT EVALUATION FOR LIVER TRANSPLANT: ICD-10-CM

## 2021-12-28 DIAGNOSIS — I48.19 PERSISTENT ATRIAL FIBRILLATION: ICD-10-CM

## 2021-12-28 DIAGNOSIS — I48.19 PERSISTENT ATRIAL FIBRILLATION: Primary | ICD-10-CM

## 2021-12-28 LAB
INR PPP: 4.3 (ref 0.8–1.2)
PROTHROMBIN TIME: 43.4 SEC (ref 9–12.5)

## 2021-12-28 PROCEDURE — 85610 PROTHROMBIN TIME: CPT | Mod: NTX | Performed by: INTERNAL MEDICINE

## 2021-12-28 PROCEDURE — 93793 PR ANTICOAGULANT MGMT FOR PT TAKING WARFARIN: ICD-10-PCS | Mod: S$GLB,,,

## 2021-12-28 PROCEDURE — 36415 COLL VENOUS BLD VENIPUNCTURE: CPT | Mod: PN,TXP | Performed by: INTERNAL MEDICINE

## 2021-12-28 PROCEDURE — 93793 ANTICOAG MGMT PT WARFARIN: CPT | Mod: S$GLB,,,

## 2021-12-29 ENCOUNTER — TELEPHONE (OUTPATIENT)
Dept: TRANSPLANT | Facility: CLINIC | Age: 66
End: 2021-12-29
Payer: MEDICARE

## 2022-01-05 ENCOUNTER — ANTI-COAG VISIT (OUTPATIENT)
Dept: CARDIOLOGY | Facility: CLINIC | Age: 67
End: 2022-01-05
Payer: MEDICARE

## 2022-01-05 ENCOUNTER — TELEPHONE (OUTPATIENT)
Dept: TRANSPLANT | Facility: CLINIC | Age: 67
End: 2022-01-05
Payer: MEDICARE

## 2022-01-05 ENCOUNTER — EPISODE CHANGES (OUTPATIENT)
Dept: TRANSPLANT | Facility: CLINIC | Age: 67
End: 2022-01-05

## 2022-01-05 ENCOUNTER — PATIENT MESSAGE (OUTPATIENT)
Dept: ADMINISTRATIVE | Facility: OTHER | Age: 67
End: 2022-01-05
Payer: MEDICARE

## 2022-01-05 ENCOUNTER — HOSPITAL ENCOUNTER (INPATIENT)
Facility: HOSPITAL | Age: 67
LOS: 8 days | Discharge: HOME | DRG: 006 | End: 2022-01-13
Attending: TRANSPLANT SURGERY | Admitting: TRANSPLANT SURGERY
Payer: MEDICARE

## 2022-01-05 ENCOUNTER — ANESTHESIA EVENT (OUTPATIENT)
Dept: SURGERY | Facility: HOSPITAL | Age: 67
DRG: 006 | End: 2022-01-05
Payer: MEDICARE

## 2022-01-05 DIAGNOSIS — I48.91 ATRIAL FIBRILLATION: ICD-10-CM

## 2022-01-05 DIAGNOSIS — T38.0X5S ADVERSE EFFECT OF ADRENAL CORTICAL STEROIDS, SEQUELA: ICD-10-CM

## 2022-01-05 DIAGNOSIS — K72.10 END STAGE LIVER DISEASE: ICD-10-CM

## 2022-01-05 DIAGNOSIS — Z01.818 PRE-OP EVALUATION: ICD-10-CM

## 2022-01-05 DIAGNOSIS — I48.0 PAROXYSMAL ATRIAL FIBRILLATION: ICD-10-CM

## 2022-01-05 DIAGNOSIS — Z99.11 ENCOUNTER FOR WEANING FROM VENTILATOR: ICD-10-CM

## 2022-01-05 DIAGNOSIS — I48.91 ATRIAL FIBRILLATION WITH RVR: ICD-10-CM

## 2022-01-05 DIAGNOSIS — Z01.818 PRE-TRANSPLANT EVALUATION FOR LIVER TRANSPLANT: ICD-10-CM

## 2022-01-05 DIAGNOSIS — E11.65 TYPE 2 DIABETES MELLITUS WITH HYPERGLYCEMIA, WITH LONG-TERM CURRENT USE OF INSULIN: ICD-10-CM

## 2022-01-05 DIAGNOSIS — I48.0 PAROXYSMAL A-FIB: ICD-10-CM

## 2022-01-05 DIAGNOSIS — I48.19 PERSISTENT ATRIAL FIBRILLATION: Primary | ICD-10-CM

## 2022-01-05 DIAGNOSIS — R00.0 TACHYCARDIA: ICD-10-CM

## 2022-01-05 DIAGNOSIS — K74.69 OTHER CIRRHOSIS OF LIVER: ICD-10-CM

## 2022-01-05 DIAGNOSIS — Z79.01 CURRENT USE OF ANTICOAGULANT THERAPY: ICD-10-CM

## 2022-01-05 DIAGNOSIS — Z91.89 AT RISK FOR OPPORTUNISTIC INFECTIONS: ICD-10-CM

## 2022-01-05 DIAGNOSIS — Z29.89 PROPHYLACTIC IMMUNOTHERAPY: ICD-10-CM

## 2022-01-05 DIAGNOSIS — D62 ACUTE BLOOD LOSS ANEMIA: ICD-10-CM

## 2022-01-05 DIAGNOSIS — Z79.4 TYPE 2 DIABETES MELLITUS WITH HYPERGLYCEMIA, WITH LONG-TERM CURRENT USE OF INSULIN: ICD-10-CM

## 2022-01-05 DIAGNOSIS — K76.6 PORTAL HYPERTENSION: ICD-10-CM

## 2022-01-05 DIAGNOSIS — E11.21 TYPE 2 DIABETES MELLITUS WITH DIABETIC NEPHROPATHY, WITH LONG-TERM CURRENT USE OF INSULIN: ICD-10-CM

## 2022-01-05 DIAGNOSIS — T14.8XXA SURGICAL WOUND PRESENT: ICD-10-CM

## 2022-01-05 DIAGNOSIS — Z76.82 AWAITING LIVER TRANSPLANT: ICD-10-CM

## 2022-01-05 DIAGNOSIS — Z94.4 S/P LIVER TRANSPLANT: Primary | ICD-10-CM

## 2022-01-05 DIAGNOSIS — D63.8 ANEMIA OF CHRONIC DISEASE: ICD-10-CM

## 2022-01-05 DIAGNOSIS — I48.19 PERSISTENT ATRIAL FIBRILLATION: ICD-10-CM

## 2022-01-05 DIAGNOSIS — Z79.4 TYPE 2 DIABETES MELLITUS WITH DIABETIC NEPHROPATHY, WITH LONG-TERM CURRENT USE OF INSULIN: ICD-10-CM

## 2022-01-05 LAB
ABO + RH BLD: NORMAL
ALBUMIN SERPL BCP-MCNC: 3.8 G/DL (ref 3.5–5.2)
ALP SERPL-CCNC: 71 U/L (ref 55–135)
ALT SERPL W/O P-5'-P-CCNC: 24 U/L (ref 10–44)
ANION GAP SERPL CALC-SCNC: 9 MMOL/L (ref 8–16)
APTT BLDCRRT: 39.4 SEC (ref 21–32)
AST SERPL-CCNC: 25 U/L (ref 10–40)
BASOPHILS # BLD AUTO: 0.06 K/UL (ref 0–0.2)
BASOPHILS NFR BLD: 1 % (ref 0–1.9)
BILIRUB DIRECT SERPL-MCNC: 0.1 MG/DL (ref 0.1–0.3)
BILIRUB SERPL-MCNC: 0.4 MG/DL (ref 0.1–1)
BILIRUB UR QL STRIP: NEGATIVE
BLD GP AB SCN CELLS X3 SERPL QL: NORMAL
BLOOD BANK HEPATITIS FREEZE AND HOLD: NORMAL
BUN SERPL-MCNC: 20 MG/DL (ref 8–23)
CALCIUM SERPL-MCNC: 8.9 MG/DL (ref 8.7–10.5)
CHLORIDE SERPL-SCNC: 103 MMOL/L (ref 95–110)
CLARITY UR REFRACT.AUTO: CLEAR
CO2 SERPL-SCNC: 24 MMOL/L (ref 23–29)
COLOR UR AUTO: YELLOW
CREAT SERPL-MCNC: 0.8 MG/DL (ref 0.5–1.4)
DIFFERENTIAL METHOD: ABNORMAL
EOSINOPHIL # BLD AUTO: 0.1 K/UL (ref 0–0.5)
EOSINOPHIL NFR BLD: 2.3 % (ref 0–8)
ERYTHROCYTE [DISTWIDTH] IN BLOOD BY AUTOMATED COUNT: 13.6 % (ref 11.5–14.5)
EST. GFR  (AFRICAN AMERICAN): >60 ML/MIN/1.73 M^2
EST. GFR  (NON AFRICAN AMERICAN): >60 ML/MIN/1.73 M^2
ESTIMATED AVG GLUCOSE: 120 MG/DL (ref 68–131)
FIBRINOGEN PPP-MCNC: 317 MG/DL (ref 182–400)
GLUCOSE SERPL-MCNC: 139 MG/DL (ref 70–110)
GLUCOSE UR QL STRIP: ABNORMAL
HBA1C MFR BLD: 5.8 % (ref 4–5.6)
HCT VFR BLD AUTO: 37.2 % (ref 40–54)
HGB BLD-MCNC: 12.1 G/DL (ref 14–18)
HGB UR QL STRIP: NEGATIVE
IMM GRANULOCYTES # BLD AUTO: 0.04 K/UL (ref 0–0.04)
IMM GRANULOCYTES NFR BLD AUTO: 0.7 % (ref 0–0.5)
INR PPP: 2.8 (ref 0.8–1.2)
KETONES UR QL STRIP: NEGATIVE
LEUKOCYTE ESTERASE UR QL STRIP: NEGATIVE
LYMPHOCYTES # BLD AUTO: 1 K/UL (ref 1–4.8)
LYMPHOCYTES NFR BLD: 16.1 % (ref 18–48)
MAGNESIUM SERPL-MCNC: 1.3 MG/DL (ref 1.6–2.6)
MCH RBC QN AUTO: 30.2 PG (ref 27–31)
MCHC RBC AUTO-ENTMCNC: 32.5 G/DL (ref 32–36)
MCV RBC AUTO: 93 FL (ref 82–98)
MONOCYTES # BLD AUTO: 0.7 K/UL (ref 0.3–1)
MONOCYTES NFR BLD: 11.1 % (ref 4–15)
NEUTROPHILS # BLD AUTO: 4.1 K/UL (ref 1.8–7.7)
NEUTROPHILS NFR BLD: 68.8 % (ref 38–73)
NITRITE UR QL STRIP: NEGATIVE
NRBC BLD-RTO: 0 /100 WBC
PH UR STRIP: 5 [PH] (ref 5–8)
PLATELET # BLD AUTO: 132 K/UL (ref 150–450)
PMV BLD AUTO: 11.1 FL (ref 9.2–12.9)
POTASSIUM SERPL-SCNC: 4 MMOL/L (ref 3.5–5.1)
PROT SERPL-MCNC: 7.1 G/DL (ref 6–8.4)
PROT UR QL STRIP: NEGATIVE
PROTHROMBIN TIME: 29.2 SEC (ref 9–12.5)
RBC # BLD AUTO: 4.01 M/UL (ref 4.6–6.2)
SARS-COV-2 RDRP RESP QL NAA+PROBE: NEGATIVE
SODIUM SERPL-SCNC: 136 MMOL/L (ref 136–145)
SP GR UR STRIP: 1.02 (ref 1–1.03)
URN SPEC COLLECT METH UR: ABNORMAL
WBC # BLD AUTO: 5.97 K/UL (ref 3.9–12.7)

## 2022-01-05 PROCEDURE — 87522 HEPATITIS C REVRS TRNSCRPJ: CPT | Performed by: PHYSICIAN ASSISTANT

## 2022-01-05 PROCEDURE — 86803 HEPATITIS C AB TEST: CPT | Performed by: PHYSICIAN ASSISTANT

## 2022-01-05 PROCEDURE — 83735 ASSAY OF MAGNESIUM: CPT | Performed by: PHYSICIAN ASSISTANT

## 2022-01-05 PROCEDURE — 85610 PROTHROMBIN TIME: CPT | Performed by: PHYSICIAN ASSISTANT

## 2022-01-05 PROCEDURE — 85384 FIBRINOGEN ACTIVITY: CPT | Performed by: PHYSICIAN ASSISTANT

## 2022-01-05 PROCEDURE — 82248 BILIRUBIN DIRECT: CPT | Performed by: PHYSICIAN ASSISTANT

## 2022-01-05 PROCEDURE — 93010 EKG 12-LEAD: ICD-10-PCS | Mod: ,,, | Performed by: INTERNAL MEDICINE

## 2022-01-05 PROCEDURE — 36415 COLL VENOUS BLD VENIPUNCTURE: CPT | Performed by: PHYSICIAN ASSISTANT

## 2022-01-05 PROCEDURE — 93005 ELECTROCARDIOGRAM TRACING: CPT | Mod: NTX

## 2022-01-05 PROCEDURE — 20600001 HC STEP DOWN PRIVATE ROOM: Mod: NTX

## 2022-01-05 PROCEDURE — 99223 1ST HOSP IP/OBS HIGH 75: CPT | Mod: 57,NTX,, | Performed by: PHYSICIAN ASSISTANT

## 2022-01-05 PROCEDURE — 93010 ELECTROCARDIOGRAM REPORT: CPT | Mod: ,,, | Performed by: INTERNAL MEDICINE

## 2022-01-05 PROCEDURE — 85025 COMPLETE CBC W/AUTO DIFF WBC: CPT | Performed by: PHYSICIAN ASSISTANT

## 2022-01-05 PROCEDURE — 86706 HEP B SURFACE ANTIBODY: CPT | Performed by: PHYSICIAN ASSISTANT

## 2022-01-05 PROCEDURE — U0002 COVID-19 LAB TEST NON-CDC: HCPCS | Performed by: PHYSICIAN ASSISTANT

## 2022-01-05 PROCEDURE — 93793 PR ANTICOAGULANT MGMT FOR PT TAKING WARFARIN: ICD-10-PCS | Mod: S$GLB,,,

## 2022-01-05 PROCEDURE — 87340 HEPATITIS B SURFACE AG IA: CPT | Performed by: PHYSICIAN ASSISTANT

## 2022-01-05 PROCEDURE — 86850 RBC ANTIBODY SCREEN: CPT | Performed by: PHYSICIAN ASSISTANT

## 2022-01-05 PROCEDURE — 99000 SPECIMEN HANDLING OFFICE-LAB: CPT | Performed by: PHYSICIAN ASSISTANT

## 2022-01-05 PROCEDURE — 80053 COMPREHEN METABOLIC PANEL: CPT | Performed by: PHYSICIAN ASSISTANT

## 2022-01-05 PROCEDURE — 87389 HIV-1 AG W/HIV-1&-2 AB AG IA: CPT | Performed by: PHYSICIAN ASSISTANT

## 2022-01-05 PROCEDURE — 99223 PR INITIAL HOSPITAL CARE,LEVL III: ICD-10-PCS | Mod: 57,NTX,, | Performed by: PHYSICIAN ASSISTANT

## 2022-01-05 PROCEDURE — 86920 COMPATIBILITY TEST SPIN: CPT | Performed by: PHYSICIAN ASSISTANT

## 2022-01-05 PROCEDURE — 81003 URINALYSIS AUTO W/O SCOPE: CPT | Performed by: PHYSICIAN ASSISTANT

## 2022-01-05 PROCEDURE — 86704 HEP B CORE ANTIBODY TOTAL: CPT | Performed by: PHYSICIAN ASSISTANT

## 2022-01-05 PROCEDURE — 87086 URINE CULTURE/COLONY COUNT: CPT | Performed by: PHYSICIAN ASSISTANT

## 2022-01-05 PROCEDURE — 85730 THROMBOPLASTIN TIME PARTIAL: CPT | Performed by: PHYSICIAN ASSISTANT

## 2022-01-05 PROCEDURE — 93793 ANTICOAG MGMT PT WARFARIN: CPT | Mod: S$GLB,,,

## 2022-01-05 PROCEDURE — 83036 HEMOGLOBIN GLYCOSYLATED A1C: CPT | Performed by: PHYSICIAN ASSISTANT

## 2022-01-05 RX ORDER — MUPIROCIN 20 MG/G
OINTMENT TOPICAL
Status: DISCONTINUED | OUTPATIENT
Start: 2022-01-05 | End: 2022-01-06

## 2022-01-05 RX ORDER — HYDROCODONE BITARTRATE AND ACETAMINOPHEN 500; 5 MG/1; MG/1
TABLET ORAL
Status: DISCONTINUED | OUTPATIENT
Start: 2022-01-05 | End: 2022-01-06

## 2022-01-05 RX ORDER — METHYLPREDNISOLONE SODIUM SUCCINATE 500 MG/8ML
500 INJECTION INTRAMUSCULAR; INTRAVENOUS
Status: DISCONTINUED | OUTPATIENT
Start: 2022-01-05 | End: 2022-01-06

## 2022-01-06 ENCOUNTER — ANESTHESIA (OUTPATIENT)
Dept: SURGERY | Facility: HOSPITAL | Age: 67
DRG: 006 | End: 2022-01-06
Payer: MEDICARE

## 2022-01-06 PROBLEM — T14.8XXA SURGICAL WOUND PRESENT: Status: ACTIVE | Noted: 2022-01-06

## 2022-01-06 PROBLEM — T38.0X5S ADVERSE EFFECT OF ADRENAL CORTICAL STEROIDS, SEQUELA: Status: ACTIVE | Noted: 2022-01-06

## 2022-01-06 PROBLEM — Z29.89 PROPHYLACTIC IMMUNOTHERAPY: Status: ACTIVE | Noted: 2022-01-06

## 2022-01-06 PROBLEM — Z94.4 S/P LIVER TRANSPLANT: Status: ACTIVE | Noted: 2022-01-06

## 2022-01-06 PROBLEM — Z91.89 AT RISK FOR OPPORTUNISTIC INFECTIONS: Status: ACTIVE | Noted: 2022-01-06

## 2022-01-06 LAB
ALBUMIN SERPL BCP-MCNC: 2.6 G/DL (ref 3.5–5.2)
ALBUMIN SERPL BCP-MCNC: 2.9 G/DL (ref 3.5–5.2)
ALBUMIN SERPL BCP-MCNC: 3.3 G/DL (ref 3.5–5.2)
ALLENS TEST: ABNORMAL
ALP SERPL-CCNC: 55 U/L (ref 55–135)
ALT SERPL W/O P-5'-P-CCNC: 404 U/L (ref 10–44)
ALT SERPL W/O P-5'-P-CCNC: 452 U/L (ref 10–44)
AMYLASE SERPL-CCNC: 29 U/L (ref 20–110)
ANION GAP SERPL CALC-SCNC: 14 MMOL/L (ref 8–16)
ANION GAP SERPL CALC-SCNC: 19 MMOL/L (ref 8–16)
APTT BLDCRRT: 32.6 SEC (ref 21–32)
APTT BLDCRRT: 38.8 SEC (ref 21–32)
APTT BLDCRRT: 40.8 SEC (ref 21–32)
AST SERPL-CCNC: 1057 U/L (ref 10–40)
AST SERPL-CCNC: 537 U/L (ref 10–40)
AST SERPL-CCNC: 732 U/L (ref 10–40)
AST SERPL-CCNC: 902 U/L (ref 10–40)
BASOPHILS # BLD AUTO: 0.01 K/UL (ref 0–0.2)
BASOPHILS # BLD AUTO: 0.03 K/UL (ref 0–0.2)
BASOPHILS # BLD AUTO: 0.04 K/UL (ref 0–0.2)
BASOPHILS NFR BLD: 0.1 % (ref 0–1.9)
BASOPHILS NFR BLD: 0.3 % (ref 0–1.9)
BASOPHILS NFR BLD: 0.4 % (ref 0–1.9)
BILIRUB SERPL-MCNC: 1.2 MG/DL (ref 0.1–1)
BLOOD BANK HEPATITIS FREEZE AND HOLD: NORMAL
BUN SERPL-MCNC: 16 MG/DL (ref 8–23)
BUN SERPL-MCNC: 17 MG/DL (ref 8–23)
CA-I BLDV-SCNC: 1.08 MMOL/L (ref 1.06–1.42)
CA-I BLDV-SCNC: 1.16 MMOL/L (ref 1.06–1.42)
CALCIUM SERPL-MCNC: 8 MG/DL (ref 8.7–10.5)
CALCIUM SERPL-MCNC: 8.5 MG/DL (ref 8.7–10.5)
CHLORIDE SERPL-SCNC: 102 MMOL/L (ref 95–110)
CHLORIDE SERPL-SCNC: 104 MMOL/L (ref 95–110)
CO2 SERPL-SCNC: 21 MMOL/L (ref 23–29)
CO2 SERPL-SCNC: 25 MMOL/L (ref 23–29)
CREAT SERPL-MCNC: 1 MG/DL (ref 0.5–1.4)
CREAT SERPL-MCNC: 1.2 MG/DL (ref 0.5–1.4)
DELSYS: ABNORMAL
DIFFERENTIAL METHOD: ABNORMAL
EOSINOPHIL # BLD AUTO: 0 K/UL (ref 0–0.5)
EOSINOPHIL NFR BLD: 0 % (ref 0–8)
EOSINOPHIL NFR BLD: 0.1 % (ref 0–8)
EOSINOPHIL NFR BLD: 0.2 % (ref 0–8)
ERYTHROCYTE [DISTWIDTH] IN BLOOD BY AUTOMATED COUNT: 14.4 % (ref 11.5–14.5)
ERYTHROCYTE [DISTWIDTH] IN BLOOD BY AUTOMATED COUNT: 14.5 % (ref 11.5–14.5)
ERYTHROCYTE [DISTWIDTH] IN BLOOD BY AUTOMATED COUNT: 14.6 % (ref 11.5–14.5)
ERYTHROCYTE [SEDIMENTATION RATE] IN BLOOD BY WESTERGREN METHOD: 18 MM/H
EST. GFR  (AFRICAN AMERICAN): >60 ML/MIN/1.73 M^2
EST. GFR  (AFRICAN AMERICAN): >60 ML/MIN/1.73 M^2
EST. GFR  (NON AFRICAN AMERICAN): >60 ML/MIN/1.73 M^2
EST. GFR  (NON AFRICAN AMERICAN): >60 ML/MIN/1.73 M^2
FIBRINOGEN PPP-MCNC: 176 MG/DL (ref 182–400)
FIBRINOGEN PPP-MCNC: 189 MG/DL (ref 182–400)
FIBRINOGEN PPP-MCNC: 281 MG/DL (ref 182–400)
FIO2: 100
FIO2: 50
FIO2: 50
GLUCOSE SERPL-MCNC: 112 MG/DL (ref 70–110)
GLUCOSE SERPL-MCNC: 118 MG/DL (ref 70–110)
GLUCOSE SERPL-MCNC: 123 MG/DL (ref 70–110)
GLUCOSE SERPL-MCNC: 206 MG/DL (ref 70–110)
GLUCOSE SERPL-MCNC: 224 MG/DL (ref 70–110)
GLUCOSE SERPL-MCNC: 224 MG/DL (ref 70–110)
GLUCOSE SERPL-MCNC: 232 MG/DL (ref 70–110)
GLUCOSE SERPL-MCNC: 234 MG/DL (ref 70–110)
GLUCOSE SERPL-MCNC: 242 MG/DL (ref 70–110)
GLUCOSE SERPL-MCNC: 274 MG/DL (ref 70–110)
HBV CORE AB SERPL QL IA: NEGATIVE
HBV SURFACE AB SER-ACNC: NEGATIVE M[IU]/ML
HBV SURFACE AG SERPL QL IA: NEGATIVE
HCO3 UR-SCNC: 19.8 MMOL/L (ref 24–28)
HCO3 UR-SCNC: 20.6 MMOL/L (ref 24–28)
HCO3 UR-SCNC: 21.3 MMOL/L (ref 24–28)
HCO3 UR-SCNC: 23.1 MMOL/L (ref 24–28)
HCO3 UR-SCNC: 23.9 MMOL/L (ref 24–28)
HCO3 UR-SCNC: 24.7 MMOL/L (ref 24–28)
HCO3 UR-SCNC: 26.4 MMOL/L (ref 24–28)
HCO3 UR-SCNC: 27.8 MMOL/L (ref 24–28)
HCO3 UR-SCNC: 28.7 MMOL/L (ref 24–28)
HCT VFR BLD AUTO: 32.4 % (ref 40–54)
HCT VFR BLD AUTO: 32.9 % (ref 40–54)
HCT VFR BLD AUTO: 32.9 % (ref 40–54)
HCT VFR BLD AUTO: 35.6 % (ref 40–54)
HCT VFR BLD AUTO: 38 % (ref 40–54)
HCT VFR BLD CALC: 24 %PCV (ref 36–54)
HCT VFR BLD CALC: 29 %PCV (ref 36–54)
HCT VFR BLD CALC: 29 %PCV (ref 36–54)
HCT VFR BLD CALC: 30 %PCV (ref 36–54)
HCT VFR BLD CALC: 30 %PCV (ref 36–54)
HCT VFR BLD CALC: 32 %PCV (ref 36–54)
HCT VFR BLD CALC: 33 %PCV (ref 36–54)
HCT VFR BLD CALC: 33 %PCV (ref 36–54)
HCV AB SERPL QL IA: NEGATIVE
HGB BLD-MCNC: 10.6 G/DL (ref 14–18)
HGB BLD-MCNC: 11.1 G/DL (ref 14–18)
HGB BLD-MCNC: 11.4 G/DL (ref 14–18)
HGB BLD-MCNC: 11.5 G/DL (ref 14–18)
HGB BLD-MCNC: 12.5 G/DL (ref 14–18)
HIV 1+2 AB+HIV1 P24 AG SERPL QL IA: NEGATIVE
IMM GRANULOCYTES # BLD AUTO: 0.04 K/UL (ref 0–0.04)
IMM GRANULOCYTES # BLD AUTO: 0.05 K/UL (ref 0–0.04)
IMM GRANULOCYTES # BLD AUTO: 0.07 K/UL (ref 0–0.04)
IMM GRANULOCYTES NFR BLD AUTO: 0.5 % (ref 0–0.5)
IMM GRANULOCYTES NFR BLD AUTO: 0.5 % (ref 0–0.5)
IMM GRANULOCYTES NFR BLD AUTO: 0.6 % (ref 0–0.5)
INR PPP: 1.7 (ref 0.8–1.2)
INR PPP: 1.7 (ref 0.8–1.2)
INR PPP: 1.8 (ref 0.8–1.2)
INR PPP: 2.5 (ref 0.8–1.2)
INR PPP: 3 (ref 0.8–1.2)
LDH SERPL L TO P-CCNC: 1501 U/L (ref 110–260)
LDH SERPL L TO P-CCNC: 4.73 MMOL/L (ref 0.36–1.25)
LDH SERPL L TO P-CCNC: 6.78 MMOL/L (ref 0.36–1.25)
LDH SERPL L TO P-CCNC: 8.63 MMOL/L (ref 0.36–1.25)
LYMPHOCYTES # BLD AUTO: 0.2 K/UL (ref 1–4.8)
LYMPHOCYTES # BLD AUTO: 0.3 K/UL (ref 1–4.8)
LYMPHOCYTES # BLD AUTO: 0.4 K/UL (ref 1–4.8)
LYMPHOCYTES NFR BLD: 2.6 % (ref 18–48)
LYMPHOCYTES NFR BLD: 2.8 % (ref 18–48)
LYMPHOCYTES NFR BLD: 4.3 % (ref 18–48)
MAGNESIUM SERPL-MCNC: 1.3 MG/DL (ref 1.6–2.6)
MAGNESIUM SERPL-MCNC: 1.4 MG/DL (ref 1.6–2.6)
MAGNESIUM SERPL-MCNC: 1.4 MG/DL (ref 1.6–2.6)
MCH RBC QN AUTO: 29.6 PG (ref 27–31)
MCH RBC QN AUTO: 29.9 PG (ref 27–31)
MCH RBC QN AUTO: 30.8 PG (ref 27–31)
MCHC RBC AUTO-ENTMCNC: 32.3 G/DL (ref 32–36)
MCHC RBC AUTO-ENTMCNC: 32.9 G/DL (ref 32–36)
MCHC RBC AUTO-ENTMCNC: 34.7 G/DL (ref 32–36)
MCV RBC AUTO: 89 FL (ref 82–98)
MCV RBC AUTO: 90 FL (ref 82–98)
MCV RBC AUTO: 93 FL (ref 82–98)
MODE: ABNORMAL
MONOCYTES # BLD AUTO: 0.6 K/UL (ref 0.3–1)
MONOCYTES # BLD AUTO: 1 K/UL (ref 0.3–1)
MONOCYTES # BLD AUTO: 1.3 K/UL (ref 0.3–1)
MONOCYTES NFR BLD: 10 % (ref 4–15)
MONOCYTES NFR BLD: 11.4 % (ref 4–15)
MONOCYTES NFR BLD: 7.9 % (ref 4–15)
NEUTROPHILS # BLD AUTO: 6.8 K/UL (ref 1.8–7.7)
NEUTROPHILS # BLD AUTO: 8.1 K/UL (ref 1.8–7.7)
NEUTROPHILS # BLD AUTO: 9.8 K/UL (ref 1.8–7.7)
NEUTROPHILS NFR BLD: 84.6 % (ref 38–73)
NEUTROPHILS NFR BLD: 85 % (ref 38–73)
NEUTROPHILS NFR BLD: 88.7 % (ref 38–73)
NRBC BLD-RTO: 0 /100 WBC
PCO2 BLDA: 36.9 MMHG (ref 35–45)
PCO2 BLDA: 41 MMHG (ref 35–45)
PCO2 BLDA: 41.1 MMHG (ref 35–45)
PCO2 BLDA: 41.1 MMHG (ref 35–45)
PCO2 BLDA: 42.4 MMHG (ref 35–45)
PCO2 BLDA: 43.3 MMHG (ref 35–45)
PCO2 BLDA: 44.4 MMHG (ref 35–45)
PCO2 BLDA: 48.6 MMHG (ref 35–45)
PCO2 BLDA: 48.9 MMHG (ref 35–45)
PEEP: 5
PH SMN: 7.29 [PH] (ref 7.35–7.45)
PH SMN: 7.3 [PH] (ref 7.35–7.45)
PH SMN: 7.31 [PH] (ref 7.35–7.45)
PH SMN: 7.31 [PH] (ref 7.35–7.45)
PH SMN: 7.34 [PH] (ref 7.35–7.45)
PH SMN: 7.36 [PH] (ref 7.35–7.45)
PH SMN: 7.37 [PH] (ref 7.35–7.45)
PH SMN: 7.42 [PH] (ref 7.35–7.45)
PH SMN: 7.48 [PH] (ref 7.35–7.45)
PHOSPHATE SERPL-MCNC: 1.9 MG/DL (ref 2.7–4.5)
PLATELET # BLD AUTO: 117 K/UL (ref 150–450)
PLATELET # BLD AUTO: 118 K/UL (ref 150–450)
PLATELET # BLD AUTO: 122 K/UL (ref 150–450)
PLATELET # BLD AUTO: 134 K/UL (ref 150–450)
PLATELET # BLD AUTO: 71 K/UL (ref 150–450)
PMV BLD AUTO: 10.9 FL (ref 9.2–12.9)
PMV BLD AUTO: 11.2 FL (ref 9.2–12.9)
PMV BLD AUTO: 11.2 FL (ref 9.2–12.9)
PMV BLD AUTO: 11.4 FL (ref 9.2–12.9)
PMV BLD AUTO: 12.1 FL (ref 9.2–12.9)
PO2 BLDA: 142 MMHG (ref 80–100)
PO2 BLDA: 185 MMHG (ref 80–100)
PO2 BLDA: 198 MMHG (ref 80–100)
PO2 BLDA: 204 MMHG (ref 80–100)
PO2 BLDA: 227 MMHG (ref 80–100)
PO2 BLDA: 267 MMHG (ref 80–100)
PO2 BLDA: 294 MMHG (ref 80–100)
PO2 BLDA: 325 MMHG (ref 80–100)
PO2 BLDA: 36 MMHG (ref 40–60)
PO2 BLDA: 76 MMHG (ref 80–100)
POC BE: -1 MMOL/L
POC BE: -2 MMOL/L
POC BE: -3 MMOL/L
POC BE: -5 MMOL/L
POC BE: -6 MMOL/L
POC BE: -7 MMOL/L
POC BE: 1 MMOL/L
POC BE: 4 MMOL/L
POC BE: 4 MMOL/L
POC IONIZED CALCIUM: 0.89 MMOL/L (ref 1.06–1.42)
POC IONIZED CALCIUM: 1.04 MMOL/L (ref 1.06–1.42)
POC IONIZED CALCIUM: 1.07 MMOL/L (ref 1.06–1.42)
POC IONIZED CALCIUM: 1.09 MMOL/L (ref 1.06–1.42)
POC IONIZED CALCIUM: 1.1 MMOL/L (ref 1.06–1.42)
POC IONIZED CALCIUM: 1.12 MMOL/L (ref 1.06–1.42)
POC IONIZED CALCIUM: 1.15 MMOL/L (ref 1.06–1.42)
POC IONIZED CALCIUM: 1.2 MMOL/L (ref 1.06–1.42)
POC SATURATED O2: 100 % (ref 95–100)
POC SATURATED O2: 66 % (ref 95–100)
POC SATURATED O2: 95 % (ref 95–100)
POC SATURATED O2: 99 % (ref 95–100)
POC SATURATED O2: 99 % (ref 95–100)
POC TCO2: 21 MMOL/L (ref 23–27)
POC TCO2: 22 MMOL/L (ref 23–27)
POC TCO2: 23 MMOL/L (ref 23–27)
POC TCO2: 24 MMOL/L (ref 23–27)
POC TCO2: 25 MMOL/L (ref 23–27)
POC TCO2: 26 MMOL/L (ref 23–27)
POC TCO2: 28 MMOL/L (ref 23–27)
POC TCO2: 29 MMOL/L (ref 23–27)
POC TCO2: 30 MMOL/L (ref 23–27)
POCT GLUCOSE: 118 MG/DL (ref 70–110)
POCT GLUCOSE: 121 MG/DL (ref 70–110)
POCT GLUCOSE: 125 MG/DL (ref 70–110)
POCT GLUCOSE: 128 MG/DL (ref 70–110)
POCT GLUCOSE: 144 MG/DL (ref 70–110)
POCT GLUCOSE: 145 MG/DL (ref 70–110)
POCT GLUCOSE: 158 MG/DL (ref 70–110)
POCT GLUCOSE: 164 MG/DL (ref 70–110)
POCT GLUCOSE: 171 MG/DL (ref 70–110)
POCT GLUCOSE: 216 MG/DL (ref 70–110)
POCT GLUCOSE: 222 MG/DL (ref 70–110)
POTASSIUM BLD-SCNC: 3.1 MMOL/L (ref 3.5–5.1)
POTASSIUM BLD-SCNC: 3.1 MMOL/L (ref 3.5–5.1)
POTASSIUM BLD-SCNC: 3.2 MMOL/L (ref 3.5–5.1)
POTASSIUM BLD-SCNC: 3.5 MMOL/L (ref 3.5–5.1)
POTASSIUM BLD-SCNC: 3.6 MMOL/L (ref 3.5–5.1)
POTASSIUM BLD-SCNC: 3.9 MMOL/L (ref 3.5–5.1)
POTASSIUM BLD-SCNC: 4 MMOL/L (ref 3.5–5.1)
POTASSIUM BLD-SCNC: 4.4 MMOL/L (ref 3.5–5.1)
POTASSIUM SERPL-SCNC: 3.2 MMOL/L (ref 3.5–5.1)
POTASSIUM SERPL-SCNC: 3.3 MMOL/L (ref 3.5–5.1)
POTASSIUM SERPL-SCNC: 4.1 MMOL/L (ref 3.5–5.1)
POTASSIUM SERPL-SCNC: 4.6 MMOL/L (ref 3.5–5.1)
PROT SERPL-MCNC: 4.9 G/DL (ref 6–8.4)
PROTHROMBIN TIME: 17.6 SEC (ref 9–12.5)
PROTHROMBIN TIME: 17.8 SEC (ref 9–12.5)
PROTHROMBIN TIME: 18.7 SEC (ref 9–12.5)
PROTHROMBIN TIME: 26 SEC (ref 9–12.5)
PROTHROMBIN TIME: 30.7 SEC (ref 9–12.5)
RBC # BLD AUTO: 3.7 M/UL (ref 4.6–6.2)
RBC # BLD AUTO: 3.85 M/UL (ref 4.6–6.2)
RBC # BLD AUTO: 4.23 M/UL (ref 4.6–6.2)
SAMPLE: ABNORMAL
SITE: ABNORMAL
SODIUM BLD-SCNC: 138 MMOL/L (ref 136–145)
SODIUM BLD-SCNC: 139 MMOL/L (ref 136–145)
SODIUM BLD-SCNC: 140 MMOL/L (ref 136–145)
SODIUM BLD-SCNC: 140 MMOL/L (ref 136–145)
SODIUM BLD-SCNC: 141 MMOL/L (ref 136–145)
SODIUM BLD-SCNC: 142 MMOL/L (ref 136–145)
SODIUM SERPL-SCNC: 137 MMOL/L (ref 136–145)
SODIUM SERPL-SCNC: 140 MMOL/L (ref 136–145)
SODIUM SERPL-SCNC: 142 MMOL/L (ref 136–145)
SODIUM SERPL-SCNC: 143 MMOL/L (ref 136–145)
VT: 550
WBC # BLD AUTO: 11.53 K/UL (ref 3.9–12.7)
WBC # BLD AUTO: 7.62 K/UL (ref 3.9–12.7)
WBC # BLD AUTO: 9.57 K/UL (ref 3.9–12.7)

## 2022-01-06 PROCEDURE — D9220A PRA ANESTHESIA: ICD-10-PCS | Mod: ANES,,, | Performed by: ANESTHESIOLOGY

## 2022-01-06 PROCEDURE — 85014 HEMATOCRIT: CPT

## 2022-01-06 PROCEDURE — C1894 INTRO/SHEATH, NON-LASER: HCPCS | Mod: NTX | Performed by: ANESTHESIOLOGY

## 2022-01-06 PROCEDURE — 63600175 PHARM REV CODE 636 W HCPCS: Mod: JG

## 2022-01-06 PROCEDURE — 27100021 HC MULTIPORT INFUSION MANIFOLD: Mod: NTX | Performed by: ANESTHESIOLOGY

## 2022-01-06 PROCEDURE — 88313 SPECIAL STAINS GROUP 2: CPT | Mod: 26,,, | Performed by: PATHOLOGY

## 2022-01-06 PROCEDURE — 36620 INSERTION CATHETER ARTERY: CPT | Mod: 59,,, | Performed by: ANESTHESIOLOGY

## 2022-01-06 PROCEDURE — 84450 TRANSFERASE (AST) (SGOT): CPT | Mod: 91 | Performed by: STUDENT IN AN ORGANIZED HEALTH CARE EDUCATION/TRAINING PROGRAM

## 2022-01-06 PROCEDURE — 80048 BASIC METABOLIC PNL TOTAL CA: CPT | Performed by: STUDENT IN AN ORGANIZED HEALTH CARE EDUCATION/TRAINING PROGRAM

## 2022-01-06 PROCEDURE — 36415 COLL VENOUS BLD VENIPUNCTURE: CPT | Performed by: TRANSPLANT SURGERY

## 2022-01-06 PROCEDURE — 88342 IMHCHEM/IMCYTCHM 1ST ANTB: CPT | Performed by: PATHOLOGY

## 2022-01-06 PROCEDURE — 84295 ASSAY OF SERUM SODIUM: CPT | Mod: 91 | Performed by: TRANSPLANT SURGERY

## 2022-01-06 PROCEDURE — 63600175 PHARM REV CODE 636 W HCPCS: Mod: JG | Performed by: STUDENT IN AN ORGANIZED HEALTH CARE EDUCATION/TRAINING PROGRAM

## 2022-01-06 PROCEDURE — 85730 THROMBOPLASTIN TIME PARTIAL: CPT | Performed by: STUDENT IN AN ORGANIZED HEALTH CARE EDUCATION/TRAINING PROGRAM

## 2022-01-06 PROCEDURE — 99291 PR CRITICAL CARE, E/M 30-74 MINUTES: ICD-10-PCS | Mod: GC,,, | Performed by: STUDENT IN AN ORGANIZED HEALTH CARE EDUCATION/TRAINING PROGRAM

## 2022-01-06 PROCEDURE — 84460 ALANINE AMINO (ALT) (SGPT): CPT | Performed by: TRANSPLANT SURGERY

## 2022-01-06 PROCEDURE — 83735 ASSAY OF MAGNESIUM: CPT | Performed by: TRANSPLANT SURGERY

## 2022-01-06 PROCEDURE — 47135 TRANSPLANTATION OF LIVER: CPT | Mod: ,,, | Performed by: TRANSPLANT SURGERY

## 2022-01-06 PROCEDURE — 88309 TISSUE EXAM BY PATHOLOGIST: CPT | Mod: 26,,, | Performed by: PATHOLOGY

## 2022-01-06 PROCEDURE — 25000003 PHARM REV CODE 250: Performed by: STUDENT IN AN ORGANIZED HEALTH CARE EDUCATION/TRAINING PROGRAM

## 2022-01-06 PROCEDURE — 85384 FIBRINOGEN ACTIVITY: CPT | Mod: 91 | Performed by: TRANSPLANT SURGERY

## 2022-01-06 PROCEDURE — 88377 M/PHMTRC ALYS ISHQUANT/SEMIQ: CPT | Performed by: PATHOLOGY

## 2022-01-06 PROCEDURE — 27201041 HC RESERVOIR, CARDIOTOMY

## 2022-01-06 PROCEDURE — C1769 GUIDE WIRE: HCPCS | Mod: NTX | Performed by: ANESTHESIOLOGY

## 2022-01-06 PROCEDURE — 27100019 HC AMBU BAG ADULT/PED: Mod: NTX | Performed by: ANESTHESIOLOGY

## 2022-01-06 PROCEDURE — 85384 FIBRINOGEN ACTIVITY: CPT | Mod: 91 | Performed by: STUDENT IN AN ORGANIZED HEALTH CARE EDUCATION/TRAINING PROGRAM

## 2022-01-06 PROCEDURE — 94761 N-INVAS EAR/PLS OXIMETRY MLT: CPT

## 2022-01-06 PROCEDURE — 82150 ASSAY OF AMYLASE: CPT | Performed by: STUDENT IN AN ORGANIZED HEALTH CARE EDUCATION/TRAINING PROGRAM

## 2022-01-06 PROCEDURE — 88341 PR IHC OR ICC EACH ADD'L SINGLE ANTIBODY  STAINPR: ICD-10-PCS | Mod: 26,,, | Performed by: PATHOLOGY

## 2022-01-06 PROCEDURE — 93503 PR INSERT/PLACE FLOW DIRECT CATH: ICD-10-PCS | Mod: 59,,, | Performed by: ANESTHESIOLOGY

## 2022-01-06 PROCEDURE — 88307 PR  SURG PATH,LEVEL V: ICD-10-PCS | Mod: 26,,, | Performed by: PATHOLOGY

## 2022-01-06 PROCEDURE — 27800506 HC CATH, RAPID INFUSION (7.5&8.5): Mod: NTX | Performed by: ANESTHESIOLOGY

## 2022-01-06 PROCEDURE — 84132 ASSAY OF SERUM POTASSIUM: CPT | Performed by: TRANSPLANT SURGERY

## 2022-01-06 PROCEDURE — P9021 RED BLOOD CELLS UNIT: HCPCS | Performed by: PHYSICIAN ASSISTANT

## 2022-01-06 PROCEDURE — 88341 IMHCHEM/IMCYTCHM EA ADD ANTB: CPT | Performed by: PATHOLOGY

## 2022-01-06 PROCEDURE — 47143 PR TRANSPLANT,PREP DONOR LIVER, WHOLE: ICD-10-PCS | Mod: 51,,, | Performed by: TRANSPLANT SURGERY

## 2022-01-06 PROCEDURE — 83615 LACTATE (LD) (LDH) ENZYME: CPT | Performed by: STUDENT IN AN ORGANIZED HEALTH CARE EDUCATION/TRAINING PROGRAM

## 2022-01-06 PROCEDURE — 99223 PR INITIAL HOSPITAL CARE,LEVL III: ICD-10-PCS | Mod: ,,, | Performed by: NURSE PRACTITIONER

## 2022-01-06 PROCEDURE — 85025 COMPLETE CBC W/AUTO DIFF WBC: CPT | Mod: 91 | Performed by: STUDENT IN AN ORGANIZED HEALTH CARE EDUCATION/TRAINING PROGRAM

## 2022-01-06 PROCEDURE — 88313 SPECIAL STAINS GROUP 2: CPT | Mod: 59 | Performed by: PATHOLOGY

## 2022-01-06 PROCEDURE — 47135 TRANSPLANTATION OF LIVER: CPT | Mod: 82,,, | Performed by: TRANSPLANT SURGERY

## 2022-01-06 PROCEDURE — 37000009 HC ANESTHESIA EA ADD 15 MINS: Performed by: TRANSPLANT SURGERY

## 2022-01-06 PROCEDURE — 63600175 PHARM REV CODE 636 W HCPCS: Performed by: STUDENT IN AN ORGANIZED HEALTH CARE EDUCATION/TRAINING PROGRAM

## 2022-01-06 PROCEDURE — 99900017 HC EXTUBATION W/PARAMETERS (STAT)

## 2022-01-06 PROCEDURE — 88342 IMHCHEM/IMCYTCHM 1ST ANTB: CPT | Mod: 26,,, | Performed by: PATHOLOGY

## 2022-01-06 PROCEDURE — 85610 PROTHROMBIN TIME: CPT | Mod: 91 | Performed by: TRANSPLANT SURGERY

## 2022-01-06 PROCEDURE — 82947 ASSAY GLUCOSE BLOOD QUANT: CPT | Mod: 91 | Performed by: TRANSPLANT SURGERY

## 2022-01-06 PROCEDURE — C1751 CATH, INF, PER/CENT/MIDLINE: HCPCS | Mod: NTX | Performed by: ANESTHESIOLOGY

## 2022-01-06 PROCEDURE — 84295 ASSAY OF SERUM SODIUM: CPT

## 2022-01-06 PROCEDURE — 82040 ASSAY OF SERUM ALBUMIN: CPT | Mod: 91 | Performed by: TRANSPLANT SURGERY

## 2022-01-06 PROCEDURE — 82330 ASSAY OF CALCIUM: CPT | Performed by: TRANSPLANT SURGERY

## 2022-01-06 PROCEDURE — 80053 COMPREHEN METABOLIC PANEL: CPT | Performed by: STUDENT IN AN ORGANIZED HEALTH CARE EDUCATION/TRAINING PROGRAM

## 2022-01-06 PROCEDURE — 82803 BLOOD GASES ANY COMBINATION: CPT

## 2022-01-06 PROCEDURE — 37000008 HC ANESTHESIA 1ST 15 MINUTES: Performed by: TRANSPLANT SURGERY

## 2022-01-06 PROCEDURE — 36620 ARTERIAL: ICD-10-PCS | Mod: 59,,, | Performed by: ANESTHESIOLOGY

## 2022-01-06 PROCEDURE — 36000930 HC OR TIME LEV VII 1ST 15 MIN: Performed by: TRANSPLANT SURGERY

## 2022-01-06 PROCEDURE — 27100025 HC TUBING, SET FLUID WARMER: Mod: NTX | Performed by: ANESTHESIOLOGY

## 2022-01-06 PROCEDURE — 84295 ASSAY OF SERUM SODIUM: CPT | Performed by: TRANSPLANT SURGERY

## 2022-01-06 PROCEDURE — 85049 AUTOMATED PLATELET COUNT: CPT | Mod: 91 | Performed by: TRANSPLANT SURGERY

## 2022-01-06 PROCEDURE — 83735 ASSAY OF MAGNESIUM: CPT | Mod: 91 | Performed by: TRANSPLANT SURGERY

## 2022-01-06 PROCEDURE — C1729 CATH, DRAINAGE: HCPCS | Performed by: TRANSPLANT SURGERY

## 2022-01-06 PROCEDURE — 47135 PR TRANSPLANT LIVER,ALLOTRANSPLANT: ICD-10-PCS | Mod: 82,,, | Performed by: TRANSPLANT SURGERY

## 2022-01-06 PROCEDURE — 85730 THROMBOPLASTIN TIME PARTIAL: CPT | Mod: 91 | Performed by: TRANSPLANT SURGERY

## 2022-01-06 PROCEDURE — 82330 ASSAY OF CALCIUM: CPT | Mod: 91 | Performed by: TRANSPLANT SURGERY

## 2022-01-06 PROCEDURE — 85610 PROTHROMBIN TIME: CPT | Performed by: STUDENT IN AN ORGANIZED HEALTH CARE EDUCATION/TRAINING PROGRAM

## 2022-01-06 PROCEDURE — 63600175 PHARM REV CODE 636 W HCPCS: Mod: JG | Performed by: TRANSPLANT SURGERY

## 2022-01-06 PROCEDURE — D9220A PRA ANESTHESIA: Mod: ANES,,, | Performed by: ANESTHESIOLOGY

## 2022-01-06 PROCEDURE — 27201423 OPTIME MED/SURG SUP & DEVICES STERILE SUPPLY: Performed by: TRANSPLANT SURGERY

## 2022-01-06 PROCEDURE — 84132 ASSAY OF SERUM POTASSIUM: CPT | Mod: 91 | Performed by: TRANSPLANT SURGERY

## 2022-01-06 PROCEDURE — 88342 IMHCHEM/IMCYTCHM 1ST ANTB: CPT | Mod: 91 | Performed by: PATHOLOGY

## 2022-01-06 PROCEDURE — 88341 IMHCHEM/IMCYTCHM EA ADD ANTB: CPT | Mod: 59 | Performed by: PATHOLOGY

## 2022-01-06 PROCEDURE — 27000191 HC C-V MONITORING

## 2022-01-06 PROCEDURE — 84132 ASSAY OF SERUM POTASSIUM: CPT

## 2022-01-06 PROCEDURE — 82947 ASSAY GLUCOSE BLOOD QUANT: CPT | Performed by: TRANSPLANT SURGERY

## 2022-01-06 PROCEDURE — P9017 PLASMA 1 DONOR FRZ W/IN 8 HR: HCPCS | Performed by: PHYSICIAN ASSISTANT

## 2022-01-06 PROCEDURE — D9220A PRA ANESTHESIA: ICD-10-PCS | Mod: CRNA,,, | Performed by: NURSE ANESTHETIST, CERTIFIED REGISTERED

## 2022-01-06 PROCEDURE — P9045 ALBUMIN (HUMAN), 5%, 250 ML: HCPCS | Mod: JG | Performed by: TRANSPLANT SURGERY

## 2022-01-06 PROCEDURE — 81300005 HC LIVER TRANSPORT, GROUND 4-5 HOURS

## 2022-01-06 PROCEDURE — 85384 FIBRINOGEN ACTIVITY: CPT | Performed by: TRANSPLANT SURGERY

## 2022-01-06 PROCEDURE — 47147 PR TRANSPLANT,PREP DONOR LIVER/ARTERIAL: ICD-10-PCS | Mod: 51,,, | Performed by: TRANSPLANT SURGERY

## 2022-01-06 PROCEDURE — 82040 ASSAY OF SERUM ALBUMIN: CPT | Performed by: TRANSPLANT SURGERY

## 2022-01-06 PROCEDURE — 99291 CRITICAL CARE FIRST HOUR: CPT | Mod: GC,,, | Performed by: STUDENT IN AN ORGANIZED HEALTH CARE EDUCATION/TRAINING PROGRAM

## 2022-01-06 PROCEDURE — 83605 ASSAY OF LACTIC ACID: CPT

## 2022-01-06 PROCEDURE — 36415 COLL VENOUS BLD VENIPUNCTURE: CPT | Performed by: STUDENT IN AN ORGANIZED HEALTH CARE EDUCATION/TRAINING PROGRAM

## 2022-01-06 PROCEDURE — 36000931 HC OR TIME LEV VII EA ADD 15 MIN: Performed by: TRANSPLANT SURGERY

## 2022-01-06 PROCEDURE — P9045 ALBUMIN (HUMAN), 5%, 250 ML: HCPCS | Mod: JG

## 2022-01-06 PROCEDURE — 94002 VENT MGMT INPAT INIT DAY: CPT

## 2022-01-06 PROCEDURE — 88342 CHG IMMUNOCYTOCHEMISTRY: ICD-10-PCS | Mod: 26,,, | Performed by: PATHOLOGY

## 2022-01-06 PROCEDURE — 27100088 HC CELL SAVER

## 2022-01-06 PROCEDURE — 82330 ASSAY OF CALCIUM: CPT

## 2022-01-06 PROCEDURE — P9045 ALBUMIN (HUMAN), 5%, 250 ML: HCPCS | Mod: JG | Performed by: STUDENT IN AN ORGANIZED HEALTH CARE EDUCATION/TRAINING PROGRAM

## 2022-01-06 PROCEDURE — 88309 PR  SURG PATH,LEVEL VI: ICD-10-PCS | Mod: 26,,, | Performed by: PATHOLOGY

## 2022-01-06 PROCEDURE — 47147 PREP DONOR LIVER/ARTERIAL: CPT | Mod: 51,,, | Performed by: TRANSPLANT SURGERY

## 2022-01-06 PROCEDURE — D9220A PRA ANESTHESIA: Mod: CRNA,,, | Performed by: NURSE ANESTHETIST, CERTIFIED REGISTERED

## 2022-01-06 PROCEDURE — 84450 TRANSFERASE (AST) (SGOT): CPT | Mod: 91 | Performed by: TRANSPLANT SURGERY

## 2022-01-06 PROCEDURE — 99000 SPECIMEN HANDLING OFFICE-LAB: CPT | Performed by: STUDENT IN AN ORGANIZED HEALTH CARE EDUCATION/TRAINING PROGRAM

## 2022-01-06 PROCEDURE — 88341 IMHCHEM/IMCYTCHM EA ADD ANTB: CPT | Mod: 26,,, | Performed by: PATHOLOGY

## 2022-01-06 PROCEDURE — 88313 PR  SPECIAL STAINS,GROUP II: ICD-10-PCS | Mod: 26,,, | Performed by: PATHOLOGY

## 2022-01-06 PROCEDURE — 85002 BLEEDING TIME TEST: CPT

## 2022-01-06 PROCEDURE — 88309 TISSUE EXAM BY PATHOLOGIST: CPT | Performed by: PATHOLOGY

## 2022-01-06 PROCEDURE — 25000003 PHARM REV CODE 250: Performed by: NURSE ANESTHETIST, CERTIFIED REGISTERED

## 2022-01-06 PROCEDURE — 88307 TISSUE EXAM BY PATHOLOGIST: CPT | Performed by: PATHOLOGY

## 2022-01-06 PROCEDURE — 37799 UNLISTED PX VASCULAR SURGERY: CPT

## 2022-01-06 PROCEDURE — 85520 HEPARIN ASSAY: CPT

## 2022-01-06 PROCEDURE — 27200656 HC CATH, FEMORAL ARTERY: Mod: NTX | Performed by: ANESTHESIOLOGY

## 2022-01-06 PROCEDURE — P9045 ALBUMIN (HUMAN), 5%, 250 ML: HCPCS | Mod: JG | Performed by: NURSE ANESTHETIST, CERTIFIED REGISTERED

## 2022-01-06 PROCEDURE — 83735 ASSAY OF MAGNESIUM: CPT | Mod: 91 | Performed by: STUDENT IN AN ORGANIZED HEALTH CARE EDUCATION/TRAINING PROGRAM

## 2022-01-06 PROCEDURE — 99900035 HC TECH TIME PER 15 MIN (STAT)

## 2022-01-06 PROCEDURE — 27000221 HC OXYGEN, UP TO 24 HOURS

## 2022-01-06 PROCEDURE — 93503 INSERT/PLACE HEART CATHETER: CPT | Mod: 59,,, | Performed by: ANESTHESIOLOGY

## 2022-01-06 PROCEDURE — C9248 INJ, CLEVIDIPINE BUTYRATE: HCPCS | Mod: JG | Performed by: STUDENT IN AN ORGANIZED HEALTH CARE EDUCATION/TRAINING PROGRAM

## 2022-01-06 PROCEDURE — 47135 PR TRANSPLANT LIVER,ALLOTRANSPLANT: ICD-10-PCS | Mod: ,,, | Performed by: TRANSPLANT SURGERY

## 2022-01-06 PROCEDURE — 88307 TISSUE EXAM BY PATHOLOGIST: CPT | Mod: 26,,, | Performed by: PATHOLOGY

## 2022-01-06 PROCEDURE — 81300000 HC LIVER ACQUISITION CHARGE

## 2022-01-06 PROCEDURE — 85018 HEMOGLOBIN: CPT | Mod: 91 | Performed by: TRANSPLANT SURGERY

## 2022-01-06 PROCEDURE — 99223 1ST HOSP IP/OBS HIGH 75: CPT | Mod: ,,, | Performed by: NURSE PRACTITIONER

## 2022-01-06 PROCEDURE — 84100 ASSAY OF PHOSPHORUS: CPT | Performed by: STUDENT IN AN ORGANIZED HEALTH CARE EDUCATION/TRAINING PROGRAM

## 2022-01-06 PROCEDURE — 63600175 PHARM REV CODE 636 W HCPCS: Performed by: NURSE ANESTHETIST, CERTIFIED REGISTERED

## 2022-01-06 PROCEDURE — 85014 HEMATOCRIT: CPT | Mod: 91 | Performed by: TRANSPLANT SURGERY

## 2022-01-06 PROCEDURE — 20000000 HC ICU ROOM

## 2022-01-06 PROCEDURE — 85014 HEMATOCRIT: CPT | Performed by: TRANSPLANT SURGERY

## 2022-01-06 PROCEDURE — 85049 AUTOMATED PLATELET COUNT: CPT | Performed by: TRANSPLANT SURGERY

## 2022-01-06 PROCEDURE — 85018 HEMOGLOBIN: CPT | Performed by: TRANSPLANT SURGERY

## 2022-01-06 RX ORDER — VASOPRESSIN 20 [USP'U]/ML
INJECTION, SOLUTION INTRAMUSCULAR; SUBCUTANEOUS
Status: DISCONTINUED | OUTPATIENT
Start: 2022-01-06 | End: 2022-01-06

## 2022-01-06 RX ORDER — MIDAZOLAM HYDROCHLORIDE 1 MG/ML
INJECTION INTRAMUSCULAR; INTRAVENOUS
Status: DISCONTINUED | OUTPATIENT
Start: 2022-01-06 | End: 2022-01-06

## 2022-01-06 RX ORDER — MORPHINE SULFATE 2 MG/ML
1 INJECTION, SOLUTION INTRAMUSCULAR; INTRAVENOUS
Status: DISCONTINUED | OUTPATIENT
Start: 2022-01-06 | End: 2022-01-06

## 2022-01-06 RX ORDER — PROPOFOL 10 MG/ML
VIAL (ML) INTRAVENOUS
Status: DISCONTINUED | OUTPATIENT
Start: 2022-01-06 | End: 2022-01-06

## 2022-01-06 RX ORDER — PROPOFOL 10 MG/ML
0-50 INJECTION, EMULSION INTRAVENOUS CONTINUOUS
Status: DISCONTINUED | OUTPATIENT
Start: 2022-01-06 | End: 2022-01-07

## 2022-01-06 RX ORDER — ALBUMIN HUMAN 50 G/1000ML
SOLUTION INTRAVENOUS CONTINUOUS PRN
Status: DISCONTINUED | OUTPATIENT
Start: 2022-01-06 | End: 2022-01-06

## 2022-01-06 RX ORDER — SODIUM CHLORIDE 0.9 % (FLUSH) 0.9 %
10 SYRINGE (ML) INJECTION
Status: DISCONTINUED | OUTPATIENT
Start: 2022-01-06 | End: 2022-01-13 | Stop reason: HOSPADM

## 2022-01-06 RX ORDER — POTASSIUM CHLORIDE 29.8 MG/ML
40 INJECTION INTRAVENOUS
Status: DISCONTINUED | OUTPATIENT
Start: 2022-01-06 | End: 2022-01-08

## 2022-01-06 RX ORDER — PREDNISONE 20 MG/1
20 TABLET ORAL DAILY
Status: DISCONTINUED | OUTPATIENT
Start: 2022-01-12 | End: 2022-01-13 | Stop reason: HOSPADM

## 2022-01-06 RX ORDER — ROCURONIUM BROMIDE 10 MG/ML
INJECTION, SOLUTION INTRAVENOUS
Status: DISCONTINUED | OUTPATIENT
Start: 2022-01-06 | End: 2022-01-06

## 2022-01-06 RX ORDER — ALBUMIN HUMAN 50 G/1000ML
SOLUTION INTRAVENOUS
Status: COMPLETED
Start: 2022-01-06 | End: 2022-01-06

## 2022-01-06 RX ORDER — OXYCODONE HYDROCHLORIDE 5 MG/1
5 TABLET ORAL EVERY 4 HOURS PRN
Status: DISCONTINUED | OUTPATIENT
Start: 2022-01-06 | End: 2022-01-13 | Stop reason: HOSPADM

## 2022-01-06 RX ORDER — MANNITOL 20 G/100ML
INJECTION, SOLUTION INTRAVENOUS
Status: DISCONTINUED | OUTPATIENT
Start: 2022-01-06 | End: 2022-01-06

## 2022-01-06 RX ORDER — GABAPENTIN 300 MG/1
300 CAPSULE ORAL 3 TIMES DAILY PRN
Status: DISCONTINUED | OUTPATIENT
Start: 2022-01-06 | End: 2022-01-13 | Stop reason: HOSPADM

## 2022-01-06 RX ORDER — HEPARIN SODIUM 5000 [USP'U]/ML
5000 INJECTION, SOLUTION INTRAVENOUS; SUBCUTANEOUS EVERY 8 HOURS
Status: DISCONTINUED | OUTPATIENT
Start: 2022-01-06 | End: 2022-01-11

## 2022-01-06 RX ORDER — TAMSULOSIN HYDROCHLORIDE 0.4 MG/1
0.4 CAPSULE ORAL NIGHTLY
Status: DISCONTINUED | OUTPATIENT
Start: 2022-01-06 | End: 2022-01-13 | Stop reason: HOSPADM

## 2022-01-06 RX ORDER — FENTANYL CITRATE 50 UG/ML
INJECTION, SOLUTION INTRAMUSCULAR; INTRAVENOUS
Status: DISCONTINUED | OUTPATIENT
Start: 2022-01-06 | End: 2022-01-06

## 2022-01-06 RX ORDER — VALGANCICLOVIR 450 MG/1
450 TABLET, FILM COATED ORAL DAILY
Status: DISCONTINUED | OUTPATIENT
Start: 2022-01-16 | End: 2022-01-13 | Stop reason: HOSPADM

## 2022-01-06 RX ORDER — ALBUMIN HUMAN 50 G/1000ML
25 SOLUTION INTRAVENOUS ONCE
Status: COMPLETED | OUTPATIENT
Start: 2022-01-06 | End: 2022-01-06

## 2022-01-06 RX ORDER — MAGNESIUM SULFATE HEPTAHYDRATE 40 MG/ML
4 INJECTION, SOLUTION INTRAVENOUS
Status: DISCONTINUED | OUTPATIENT
Start: 2022-01-06 | End: 2022-01-08

## 2022-01-06 RX ORDER — MORPHINE SULFATE 2 MG/ML
2 INJECTION, SOLUTION INTRAMUSCULAR; INTRAVENOUS
Status: DISCONTINUED | OUTPATIENT
Start: 2022-01-06 | End: 2022-01-06

## 2022-01-06 RX ORDER — ALBUMIN HUMAN 50 G/1000ML
SOLUTION INTRAVENOUS
Status: DISCONTINUED | OUTPATIENT
Start: 2022-01-06 | End: 2022-01-06 | Stop reason: HOSPADM

## 2022-01-06 RX ORDER — POTASSIUM CHLORIDE 14.9 MG/ML
60 INJECTION INTRAVENOUS
Status: DISCONTINUED | OUTPATIENT
Start: 2022-01-06 | End: 2022-01-08

## 2022-01-06 RX ORDER — FAMOTIDINE 10 MG/ML
20 INJECTION INTRAVENOUS EVERY 12 HOURS
Status: DISCONTINUED | OUTPATIENT
Start: 2022-01-06 | End: 2022-01-07

## 2022-01-06 RX ORDER — MAGNESIUM SULFATE HEPTAHYDRATE 40 MG/ML
2 INJECTION, SOLUTION INTRAVENOUS
Status: DISCONTINUED | OUTPATIENT
Start: 2022-01-06 | End: 2022-01-08

## 2022-01-06 RX ORDER — KETAMINE HCL IN 0.9 % NACL 50 MG/5 ML
SYRINGE (ML) INTRAVENOUS
Status: DISCONTINUED | OUTPATIENT
Start: 2022-01-06 | End: 2022-01-06

## 2022-01-06 RX ORDER — NOREPINEPHRINE BITARTRATE/D5W 4MG/250ML
0-3 PLASTIC BAG, INJECTION (ML) INTRAVENOUS CONTINUOUS
Status: DISCONTINUED | OUTPATIENT
Start: 2022-01-06 | End: 2022-01-07

## 2022-01-06 RX ORDER — EPINEPHRINE 1 MG/ML
INJECTION, SOLUTION INTRACARDIAC; INTRAMUSCULAR; INTRAVENOUS; SUBCUTANEOUS
Status: DISCONTINUED | OUTPATIENT
Start: 2022-01-06 | End: 2022-01-06

## 2022-01-06 RX ORDER — MUPIROCIN 20 MG/G
1 OINTMENT TOPICAL 2 TIMES DAILY
Status: DISPENSED | OUTPATIENT
Start: 2022-01-06 | End: 2022-01-11

## 2022-01-06 RX ORDER — ONDANSETRON 2 MG/ML
INJECTION INTRAMUSCULAR; INTRAVENOUS
Status: DISCONTINUED | OUTPATIENT
Start: 2022-01-06 | End: 2022-01-06

## 2022-01-06 RX ORDER — SULFAMETHOXAZOLE AND TRIMETHOPRIM 400; 80 MG/1; MG/1
1 TABLET ORAL EVERY MORNING
Status: DISCONTINUED | OUTPATIENT
Start: 2022-01-13 | End: 2022-01-13 | Stop reason: HOSPADM

## 2022-01-06 RX ORDER — LIDOCAINE HCL/PF 100 MG/5ML
SYRINGE (ML) INTRAVENOUS
Status: DISCONTINUED | OUTPATIENT
Start: 2022-01-06 | End: 2022-01-06

## 2022-01-06 RX ORDER — MYCOPHENOLATE MOFETIL 200 MG/ML
1000 POWDER, FOR SUSPENSION ORAL 2 TIMES DAILY
Status: DISCONTINUED | OUTPATIENT
Start: 2022-01-06 | End: 2022-01-07

## 2022-01-06 RX ORDER — MORPHINE SULFATE 2 MG/ML
2 INJECTION, SOLUTION INTRAMUSCULAR; INTRAVENOUS
Status: DISCONTINUED | OUTPATIENT
Start: 2022-01-06 | End: 2022-01-07

## 2022-01-06 RX ORDER — HEPARIN SODIUM 1000 [USP'U]/ML
INJECTION, SOLUTION INTRAVENOUS; SUBCUTANEOUS
Status: DISCONTINUED | OUTPATIENT
Start: 2022-01-06 | End: 2022-01-06 | Stop reason: HOSPADM

## 2022-01-06 RX ORDER — NYSTATIN 100000 [USP'U]/ML
500000 SUSPENSION ORAL
Status: DISCONTINUED | OUTPATIENT
Start: 2022-01-06 | End: 2022-01-13 | Stop reason: HOSPADM

## 2022-01-06 RX ORDER — FUROSEMIDE 10 MG/ML
INJECTION INTRAMUSCULAR; INTRAVENOUS
Status: DISCONTINUED | OUTPATIENT
Start: 2022-01-06 | End: 2022-01-06

## 2022-01-06 RX ORDER — SODIUM BICARBONATE 42 MG/ML
INJECTION, SOLUTION INTRAVENOUS
Status: DISCONTINUED | OUTPATIENT
Start: 2022-01-06 | End: 2022-01-06

## 2022-01-06 RX ORDER — OXYCODONE HYDROCHLORIDE 10 MG/1
10 TABLET ORAL EVERY 4 HOURS PRN
Status: DISCONTINUED | OUTPATIENT
Start: 2022-01-06 | End: 2022-01-13 | Stop reason: HOSPADM

## 2022-01-06 RX ORDER — HEPARIN SODIUM 1000 [USP'U]/ML
INJECTION, SOLUTION INTRAVENOUS; SUBCUTANEOUS
Status: DISCONTINUED | OUTPATIENT
Start: 2022-01-06 | End: 2022-01-06

## 2022-01-06 RX ORDER — PHENYLEPHRINE HYDROCHLORIDE 10 MG/ML
INJECTION INTRAVENOUS
Status: DISCONTINUED | OUTPATIENT
Start: 2022-01-06 | End: 2022-01-06

## 2022-01-06 RX ORDER — AMIODARONE HYDROCHLORIDE 200 MG/1
200 TABLET ORAL DAILY
Status: DISCONTINUED | OUTPATIENT
Start: 2022-01-07 | End: 2022-01-13 | Stop reason: HOSPADM

## 2022-01-06 RX ORDER — POTASSIUM CHLORIDE 29.8 MG/ML
80 INJECTION INTRAVENOUS
Status: DISCONTINUED | OUTPATIENT
Start: 2022-01-06 | End: 2022-01-08

## 2022-01-06 RX ADMIN — ROCURONIUM BROMIDE 50 MG: 10 INJECTION, SOLUTION INTRAVENOUS at 11:01

## 2022-01-06 RX ADMIN — MANNITOL 25 G: 20 INJECTION, SOLUTION INTRAVENOUS at 10:01

## 2022-01-06 RX ADMIN — SODIUM BICARBONATE 50 MEQ: 42 INJECTION, SOLUTION INTRAVENOUS at 10:01

## 2022-01-06 RX ADMIN — CALCIUM CHLORIDE 500 MG: 100 INJECTION, SOLUTION INTRAVENOUS at 10:01

## 2022-01-06 RX ADMIN — PROPOFOL 120 MG: 10 INJECTION, EMULSION INTRAVENOUS at 07:01

## 2022-01-06 RX ADMIN — OXYCODONE HYDROCHLORIDE 10 MG: 10 TABLET ORAL at 11:01

## 2022-01-06 RX ADMIN — TAMSULOSIN HYDROCHLORIDE 0.4 MG: 0.4 CAPSULE ORAL at 09:01

## 2022-01-06 RX ADMIN — SODIUM CHLORIDE, SODIUM GLUCONATE, SODIUM ACETATE, POTASSIUM CHLORIDE, MAGNESIUM CHLORIDE, SODIUM PHOSPHATE, DIBASIC, AND POTASSIUM PHOSPHATE: .53; .5; .37; .037; .03; .012; .00082 INJECTION, SOLUTION INTRAVENOUS at 08:01

## 2022-01-06 RX ADMIN — OXYCODONE HYDROCHLORIDE 10 MG: 10 TABLET ORAL at 07:01

## 2022-01-06 RX ADMIN — MUPIROCIN 1 G: 20 OINTMENT TOPICAL at 09:01

## 2022-01-06 RX ADMIN — FUROSEMIDE 100 MG: 10 INJECTION, SOLUTION INTRAMUSCULAR; INTRAVENOUS at 08:01

## 2022-01-06 RX ADMIN — MIDAZOLAM HYDROCHLORIDE 2 MG: 1 INJECTION, SOLUTION INTRAMUSCULAR; INTRAVENOUS at 06:01

## 2022-01-06 RX ADMIN — SODIUM CHLORIDE 3 G: 9 INJECTION, SOLUTION INTRAVENOUS at 08:01

## 2022-01-06 RX ADMIN — NOREPINEPHRINE BITARTRATE 0.02 MCG/KG/MIN: 1 INJECTION, SOLUTION, CONCENTRATE INTRAVENOUS at 08:01

## 2022-01-06 RX ADMIN — EPINEPHRINE 10 MCG: 1 INJECTION, SOLUTION INTRAMUSCULAR; SUBCUTANEOUS at 07:01

## 2022-01-06 RX ADMIN — VASOPRESSIN 1 UNITS: 20 INJECTION, SOLUTION INTRAMUSCULAR; SUBCUTANEOUS at 07:01

## 2022-01-06 RX ADMIN — VASOPRESSIN 0.04 UNITS/MIN: 20 INJECTION INTRAVENOUS at 08:01

## 2022-01-06 RX ADMIN — EPINEPHRINE 10 MCG: 1 INJECTION, SOLUTION INTRAMUSCULAR; SUBCUTANEOUS at 10:01

## 2022-01-06 RX ADMIN — EPINEPHRINE 10 MCG: 1 INJECTION, SOLUTION INTRAMUSCULAR; SUBCUTANEOUS at 08:01

## 2022-01-06 RX ADMIN — INSULIN HUMAN 10 UNITS: 100 INJECTION, SOLUTION PARENTERAL at 10:01

## 2022-01-06 RX ADMIN — POTASSIUM CHLORIDE 60 MEQ: 200 INJECTION, SOLUTION INTRAVENOUS at 02:01

## 2022-01-06 RX ADMIN — MORPHINE SULFATE 1 MG: 2 INJECTION, SOLUTION INTRAMUSCULAR; INTRAVENOUS at 05:01

## 2022-01-06 RX ADMIN — GLYCOPYRROLATE 0.2 MG: 0.2 INJECTION, SOLUTION INTRAMUSCULAR; INTRAVITREAL at 07:01

## 2022-01-06 RX ADMIN — ALBUMIN HUMAN 25 G: 50 SOLUTION INTRAVENOUS at 05:01

## 2022-01-06 RX ADMIN — ROCURONIUM BROMIDE 50 MG: 10 INJECTION, SOLUTION INTRAVENOUS at 07:01

## 2022-01-06 RX ADMIN — TACROLIMUS 1 MG: 1 CAPSULE, GELATIN COATED ORAL at 05:01

## 2022-01-06 RX ADMIN — SODIUM CHLORIDE, SODIUM GLUCONATE, SODIUM ACETATE, POTASSIUM CHLORIDE, MAGNESIUM CHLORIDE, SODIUM PHOSPHATE, DIBASIC, AND POTASSIUM PHOSPHATE: .53; .5; .37; .037; .03; .012; .00082 INJECTION, SOLUTION INTRAVENOUS at 07:01

## 2022-01-06 RX ADMIN — FUROSEMIDE 100 MG: 10 INJECTION, SOLUTION INTRAMUSCULAR; INTRAVENOUS at 10:01

## 2022-01-06 RX ADMIN — GLYCOPYRROLATE 0.2 MG: 0.2 INJECTION, SOLUTION INTRAMUSCULAR; INTRAVITREAL at 10:01

## 2022-01-06 RX ADMIN — AMPICILLIN SODIUM AND SULBACTAM SODIUM 3 G: 2; 1 INJECTION, POWDER, FOR SOLUTION INTRAMUSCULAR; INTRAVENOUS at 11:01

## 2022-01-06 RX ADMIN — HEPARIN SODIUM 5000 UNITS: 5000 INJECTION INTRAVENOUS; SUBCUTANEOUS at 09:01

## 2022-01-06 RX ADMIN — INSULIN HUMAN 4.8 UNITS/HR: 1 INJECTION, SOLUTION INTRAVENOUS at 05:01

## 2022-01-06 RX ADMIN — MYCOPHENOLATE MOFETIL 1000 MG: 200 POWDER, FOR SUSPENSION ORAL at 09:01

## 2022-01-06 RX ADMIN — EPINEPHRINE 0.02 MCG/KG/MIN: 1 INJECTION INTRAMUSCULAR; INTRAVENOUS; SUBCUTANEOUS at 10:01

## 2022-01-06 RX ADMIN — LIDOCAINE HYDROCHLORIDE 80 MG: 20 INJECTION, SOLUTION INTRAVENOUS at 07:01

## 2022-01-06 RX ADMIN — PHYTONADIONE 10 MG: 10 INJECTION, EMULSION INTRAMUSCULAR; INTRAVENOUS; SUBCUTANEOUS at 09:01

## 2022-01-06 RX ADMIN — CALCIUM CHLORIDE 500 MG: 100 INJECTION, SOLUTION INTRAVENOUS at 11:01

## 2022-01-06 RX ADMIN — Medication 30 MG: at 08:01

## 2022-01-06 RX ADMIN — SODIUM CHLORIDE 3 UNITS/HR: 9 INJECTION, SOLUTION INTRAVENOUS at 10:01

## 2022-01-06 RX ADMIN — PHYTONADIONE 10 MG: 10 INJECTION, EMULSION INTRAMUSCULAR; INTRAVENOUS; SUBCUTANEOUS at 02:01

## 2022-01-06 RX ADMIN — DEXTROSE MONOHYDRATE 12.5 G: 25 INJECTION, SOLUTION INTRAVENOUS at 10:01

## 2022-01-06 RX ADMIN — ALBUMIN (HUMAN): 12.5 SOLUTION INTRAVENOUS at 10:01

## 2022-01-06 RX ADMIN — ROCURONIUM BROMIDE 50 MG: 10 INJECTION, SOLUTION INTRAVENOUS at 08:01

## 2022-01-06 RX ADMIN — FENTANYL CITRATE 100 MCG: 50 INJECTION, SOLUTION INTRAMUSCULAR; INTRAVENOUS at 07:01

## 2022-01-06 RX ADMIN — PHENYLEPHRINE HYDROCHLORIDE 200 MCG: 10 INJECTION INTRAVENOUS at 07:01

## 2022-01-06 RX ADMIN — FENTANYL CITRATE 25 MCG: 50 INJECTION, SOLUTION INTRAMUSCULAR; INTRAVENOUS at 12:01

## 2022-01-06 RX ADMIN — FAMOTIDINE 20 MG: 10 INJECTION INTRAVENOUS at 09:01

## 2022-01-06 RX ADMIN — CLEVIPIDINE 1 MG/HR: 0.5 EMULSION INTRAVENOUS at 07:01

## 2022-01-06 RX ADMIN — HEPARIN SODIUM 2500 UNITS: 1000 INJECTION, SOLUTION INTRAVENOUS; SUBCUTANEOUS at 10:01

## 2022-01-06 RX ADMIN — Medication 20 MG: at 11:01

## 2022-01-06 RX ADMIN — MORPHINE SULFATE 2 MG: 2 INJECTION, SOLUTION INTRAMUSCULAR; INTRAVENOUS at 09:01

## 2022-01-06 RX ADMIN — MANNITOL 25 G: 20 INJECTION, SOLUTION INTRAVENOUS at 08:01

## 2022-01-06 RX ADMIN — VASOPRESSIN 2 UNITS: 20 INJECTION, SOLUTION INTRAMUSCULAR; SUBCUTANEOUS at 07:01

## 2022-01-06 RX ADMIN — SODIUM CHLORIDE 3 G: 9 INJECTION, SOLUTION INTRAVENOUS at 10:01

## 2022-01-06 RX ADMIN — ALBUMIN (HUMAN) 25 G: 12.5 SOLUTION INTRAVENOUS at 05:01

## 2022-01-06 RX ADMIN — MYCOPHENOLATE MOFETIL 1000 MG: 200 POWDER, FOR SUSPENSION ORAL at 03:01

## 2022-01-06 RX ADMIN — ALBUMIN (HUMAN) 25 G: 12.5 INJECTION, SOLUTION INTRAVENOUS at 05:01

## 2022-01-06 RX ADMIN — NYSTATIN 500000 UNITS: 500000 SUSPENSION ORAL at 07:01

## 2022-01-06 RX ADMIN — CALCIUM CHLORIDE 1000 MG: 100 INJECTION, SOLUTION INTRAVENOUS at 10:01

## 2022-01-06 RX ADMIN — AMPICILLIN SODIUM AND SULBACTAM SODIUM 3 G: 2; 1 INJECTION, POWDER, FOR SOLUTION INTRAMUSCULAR; INTRAVENOUS at 04:01

## 2022-01-06 NOTE — ANESTHESIA PROCEDURE NOTES
[facility]    Diagnosis: HCC  Patient location during procedure: done in OR  Timeout: 1/6/2022 7:30 AM    Staffing  Authorizing Provider: Doug Dominguez MD  Performing Provider: Doug Dominguez MD    Anesthesiologist was present at the time of the procedure.  Preanesthetic Checklist  Completed: patient identified, IV checked, site marked, risks and benefits discussed, surgical consent, monitors and equipment checked, pre-op evaluation, timeout performed and anesthesia consent given  [facility]  Skin Prep: chlorhexidine gluconate and isopropyl alcohol  Local Infiltration: none  Location: right,  internal jugular vein  Vessel Caliber: medium, patent  Vascular Doppler:  not done  Introducer: 9 Fr single lumen, manometry used.  Device: CCO/Oximetric Catheter  Catheter Size: 8 Fr  Catheter placement by yes. Heme positive aspiration all ports. PAC floated with balloon up not wedgedSterile sheath usedInsertion Attempts: 1  Indication: hemodynamic monitoring  Ultrasound Guidance  Needle advanced into vessel with real time Ultrasound guidance.  Guidewire confirmed in vessel.  Sterile sheath used.  Assessment  Central Line Bundle Protocol followed. Hand hygiene before procedure, surgical cap worn, surgical mask worn, sterile surgical gloves worn, large sterile drape used.  Dressing: sutured in place and taped and tegaderm

## 2022-01-06 NOTE — PLAN OF CARE
Pt aaox4. VSS on RA. Standing Bps obtained. Pt NPO overnight for liver tx this am. EKG, chest xray, labs/urine, and preop checklist completed. CHG bath completed this am. SCDs/TEDs applied. Consents signed. Pt picked up in stretcher this morning.

## 2022-01-06 NOTE — OP NOTE
Operative Report    Date of Procedure: 1/6/2022  Date of Transplant (UNOS): 1/6/22    Surgeons:  Surgeon(s) and Role:     * Lizet Garcia MD - Primary     * Melvin Lozada MD - Resident - Assisting     * Cem Fonseca MD - Fellow    First Assistant Attestation:  The presence of an additional attending surgeon functioning as first assistant was required due to the complexity of the procedure relative to any available residents. I certify that no resident was available who was qualified to serve as first assistant. Duties performed by the assistant included assisting the primary surgeon.    Pre-operative Diagnosis (UNOS): End Stage Liver Disease due to Primary Liver Malignancy: Hepatoma (HCC) and Cirrhosis, Cirrhosis: Fatty Liver ([place]) and Liver cell carcinoma C22.0    Post-operative Diagnosis: Same; portal vein status:  patent    Principal Procedure Performed: Orthotopic Liver Transplant  (whole liver, DBD donor, biliary reconstruction end to end donor common hepatic duct to recipient common bile duct  )  Additional Procedures Performed:   None    Anesthesia: General endotracheal  Findings: cirrhosis, portal hypertension and adhesions    Preamble  Indications and Patient Counseling: The patient is a 66 y.o. year-old male with Primary Liver Malignancy: Hepatoma (HCC) and Cirrhosis, Cirrhosis: Fatty Liver ([place]) (UNOS terminology) who has been evaluated for a liver transplant.  The procedure was thoroughly discussed with the patient, including potential risks, complications, and alternatives. Specific complications mentioned included death, graft non-function, bleeding, infection, rejection, renal failure, respiratory failure, and neurologic deficits, as well as the possibility of other complications not specifically mentioned.    Donor Risk Factors:  Prior to the operation, the patient was advised of any donor-specific risk factors requiring specific disclosure. Factors in this case included nothing that required  specific disclosure.      Specific [facility] Donor Risk criteria for the organ donor include:  None    All questions were answered, the patient voiced appropriate understanding, and he agreed to proceed with the planned procedure.    ABO Confirmation: Immediately following arrival of the donor organ and prior to implantation, a formal ABO confirmation was done according to hospital and UNOS policies.  I confirmed the UNOS ID number of the donor organ ([id number]) and the donor and recipient ABO types, directly verifying these data by comparison with the UNOS Match Run report ([id number].  This confirmation was personally done by an attending surgeon and circulating nurse, and is officially documented elsewhere.    Time-Out: A complete time out was carried out prior to incision, with confirmation of patient identity, correct procedure, correct operative site, appropriate antibiotic prophylaxis, review of any known allergies, and presence of all needed equipment.    Procedure in Detail  Following the induction of general endotracheal anesthesia, appropriate arterial and venous lines were placed by the anesthesia team.  Care was taken to pad all pressure points and avoid any potential traction injuries from positioning. The urinary bladder was catheterized.  Sequential compression boots and Keesha Huggers were used. The chest, abdomen, and upper thighs were sterilely prepped and draped.     The abdomen was entered via a wide bilateral subcostal incision. 2,000 mL of ascites was removed. The round ligament was ligated and divided. The falciform, left triangular, and gastrohepatic ligaments were divided using the electrocautery, with 2-0 silk ties as needed. The Sanchez self-retaining retractor was placed to provide exposure. Adhesions between the abdominal viscera and the abdominal wall and the undersurface of the liver were divided as needed using cautery dissection and silk ties or suture ligatures. Hilar dissection was then  carried out, with ligation of the right and left hepatic arteries as well as the cystic duct and the common hepatic duct. The recipient arterial anatomy was found to be: standard. The portal vein was exposed circumferentially from its bifurcation down to the superior border of the pancreas. The portal vein was found to be patent.  Attention was next directed to the right side of the liver where the right triangular ligament was taken down, exposing the bare area of the liver, and the IVC. The infrahepatic IVC was isolated, followed by mobilization of the retrohepatic cava, division of the right adrenal vein, and isolation of the suprahepatic IVC. The patient was given 2500 units of heparin prior to caval cross-clamping. . After assuring adequate stability after cross-clamping, the native liver was excised and the allograft liver was brought to the operative field.  The liver was perfused with cold 5% albumin during implantation to displace the organ preservation solution.  IVC reconstruction technique consisted of end to end ivc using 3-0 and 4-0 polypropylene as appropriate.  The donor portal vein was then anastomosed to the recipient portal inflow site (end portal vein) with 5-0 polypropylene. Once this was completed, anesthesia was notified and the liver was re-perfused. Copious irrigation with warm saline and temporary finger occlusion of portal inflow were used as needed to avoid any problems with hypothermia. Temporary packing, electrocautery, and polypropylene suture ligatures were used as appropriate to provide hemostasis. Once this was satisfactory, attention was directed to the arterial reconstruction. This liver did not come from a DCD donor. Arterial inflow was provided to the graft by anastomosing the recipient right-left hepatic branch patch to the donor  sa branch patch using 6-0 polypropylene. The liver was assessed for adequacy of perfusion, which was satisfactory. The gallbladder was then removed  from the allograft liver, and a temporary packing period was carried out to help assure hemostatis.   Attention was next directed toward the bile duct. The donor bile duct was prepared and assessed for adequate vascular supply. Biliary reconstruction was then performed by anastomosing the recipient common bile duct to the donor duct using 6-0 PDS sutures.  The biliary stent used was: none.  A final check for hemostasis was made. 2 19f Ankit drains were placed through separate incisions below the transverse abdominal incision and placed in the usual positions. The cavity was irrigated with saline. The abdomen was closed in layers using running #1 PDS suture. The incision was irrigated and the skin was closed with staples. At the end of the case all instrument, needle, and sponge counts were correct. The patient was taken to the ICU in good condition.     Fluids Administered:   Crystalloid (mL) 5,000   RBC (Units) 2   FFP (Units) 3   Cell Saver (CCs) 600   Albumin (Units) 500      Specimens: Explant liver  Drains: 19f Ankit drains x 2    Additional Findings:    Estimated Blood Loss: 2,000 mL  Ascites Evacuated: 2,000 mL    Ischemic Times:  Time of portal reperfusion: 1/6/2022 10:45 AM  Time of arterial reperfusion: 1/6/2022 11:13 AM  Anastomosis (warm ischemia) time: 24 minutes  Cold ischemia time: 306 minutes    Surgical Data:  Graft type (whole vs. partial): whole liver  IVC reconstruction: end to end ivc  Portal vein status: patent  Portal graft performed? no  Donor arterial anatomy: replaced left hepatic from left gastric  Donor arterial inflow: sa branch patch  Recipient arterial anatomy: standard  Recipient arterial inflow: right-left hepatic branch patch  Arterial graft performed? no  Biliary reconstruction: end to end (donor common hepatic duct to recipient common bile duct)  Biliary stent: none  Disposition of Vessels from donor of transplanted organ: sent to blood bank  Damage during procurement:  no  Procurement damage comments:      Donor Factors:  UNOS ID: )[id number]  UNOS Match Run: [id number]  Donor type: donation after brain death  Donor with reported PHS increased risk criteria: no  Donor CMV serologic status: Positive  Donor with known cancer: no  Donor HCV serologic status: Negative  Donor HBcAb: Negative  Donor HBV JAYLIN: Negative  Donor HCV JAYLIN: Negative  Liver weight: 1307 grams

## 2022-01-06 NOTE — HPI
Mr Tej Purdy is a 67 y/o M with Afib (on coumadin), CASTILLO cirrhosis, and HCC s/p TACE and RFA who is listed for liver transplant with MELD 24. He now presents to the hospital for liver transplant. He reports being in his usual state of health. Denies fever/chills. Pre op labs and imaging pending.

## 2022-01-06 NOTE — ANESTHESIA PREPROCEDURE EVALUATION
01/06/2022  Procedure(s) (LRB):  TRANSPLANT, LIVER (N/A)     Tej Purdy is a 66 y.o. male here for liver transplant.. Last echo reviewed: normal biventricular systolic function, moderate AS. Hx of CASTILLO cirrhosis with portal HTN    Patient Active Problem List   Diagnosis    Liver mass    Diabetes    Hyperlipidemia    HTN (hypertension)    CASTILLO (nonalcoholic steatohepatitis)    Skin cancer    Cirrhosis    Portal hypertension    Kidney stone    Diabetic neuropathy    PAD (peripheral artery disease)    HCC (hepatocellular carcinoma)    Leukocytosis    Type 2 diabetes mellitus    Tachycardia    Coronary artery disease    Class 1 obesity with serious comorbidity and body mass index (BMI) of 31.0 to 31.9 in adult    Awaiting liver transplant    Persistent atrial fibrillation    Atrial fibrillation    Acute hypoxemic respiratory failure    History of cardioversion    Current use of anticoagulant therapy    Pre-transplant evaluation for liver transplant    End stage liver disease       Review of patient's allergies indicates:   Allergen Reactions    Bee pollens Swelling     BEE STINGS swells body       Current Facility-Administered Medications on File Prior to Visit   Medication Dose Route Frequency Provider Last Rate Last Admin    0.9%  NaCl infusion (for blood administration)   Intravenous Q24H PRN Jane Richards PA-C        0.9%  NaCl infusion (for blood administration)   Intravenous Q24H PRN Jane Richards PA-C        ampicillin-sulbactam 3 g in sodium chloride 0.9 % 100 mL IVPB (ready to mix system)  3 g Intravenous On Call Procedure Jane Richards PA-C        methylPREDNISolone sodium succinate injection 500 mg  500 mg Intravenous On Call Procedure Jane Richards PA-C        mupirocin 2 % ointment   Nasal On Call Procedure Jane Richards PA-C        sodium  chloride 0.9% bolus 1,000 mL  1,000 mL Intravenous Once Solange Bill NP         Current Outpatient Medications on File Prior to Visit   Medication Sig Dispense Refill    amiodarone (PACERONE) 200 MG Tab Take 1 tablet (200 mg total) by mouth once daily. 30 tablet 11    aspirin (ECOTRIN) 81 MG EC tablet Take 81 mg by mouth once daily.      blood sugar diagnostic Strp To check BG 2 times daily, to use with insurance preferred meter 100 strip 11    blood-glucose meter kit To check BG 2 times daily, to use with insurance preferred meter 1 each 0    gabapentin (NEURONTIN) 300 MG capsule Take 1 capsule (300 mg total) by mouth 3 (three) times daily as needed (neuropathy). 90 capsule 5    insulin (LANTUS SOLOSTAR U-100 INSULIN) glargine 100 units/mL (3mL) SubQ pen Inject 22 Units into the skin every evening. Adjust dose as directed until AM glucose at goal . Max daily dose 30 units/day 15 mL 11    lactulose (CHRONULAC) 10 gram/15 mL solution Take 30 g by mouth 3 (three) times daily.       lancets Misc To check BG 2 times daily, to use with insurance preferred meter 100 each 11    lisinopriL (PRINIVIL,ZESTRIL) 20 MG tablet Take 20 mg by mouth once daily.      lovastatin (MEVACOR) 20 MG tablet Take 20 mg by mouth every evening.       metFORMIN (GLUCOPHAGE) 1000 MG tablet Take 1,000 mg by mouth 2 (two) times daily with meals.       metoprolol succinate (TOPROL-XL) 25 MG 24 hr tablet Take 2 tablets (50 mg total) by mouth once daily. 180 tablet 3    multivitamin capsule Take 1 capsule by mouth once daily.      omega-3 fatty acids/fish oil (FISH OIL-OMEGA-3 FATTY ACIDS) 300-1,000 mg capsule Take 1 capsule by mouth once daily.       oxyCODONE-acetaminophen (PERCOCET) 5-325 mg per tablet Take 1 tablet by mouth every 4 (four) hours as needed for Pain. (Patient not taking: Reported on 11/11/2021) 18 tablet 0    pantoprazole (PROTONIX) 20 MG tablet Take 1 tablet (20 mg total) by mouth once daily. 30 tablet 3  "   pen needle, diabetic (BD ULTRA-FINE GÉENSIS PEN NEEDLE) 32 gauge x 5/32" Ndle Use with insulin once daily 100 each 3    rifAXIMin (XIFAXAN) 550 mg Tab Take 1 tablet (550 mg total) by mouth 2 (two) times daily. 60 tablet 11    sertraline (ZOLOFT) 25 MG tablet Take 25 mg by mouth once daily.       tamsulosin (FLOMAX) 0.4 mg Cap Take 1 capsule (0.4 mg total) by mouth every evening. 30 capsule 1    warfarin (COUMADIN) 5 MG tablet 7.5mg PO daily except 5mg PO Tues/Sat -- OR AS DIRECTED BY COUMADIN CLINIC 45 tablet 5    zolpidem (AMBIEN) 5 MG Tab Take 1 tablet (5 mg total) by mouth nightly as needed (insomnia). 30 tablet 0       Past Surgical History:   Procedure Laterality Date    COLONOSCOPY  10/2020    ESOPHAGOGASTRODUODENOSCOPY  10/2020    ESOPHAGOGASTRODUODENOSCOPY N/A 7/1/2021    Procedure: EGD (ESOPHAGOGASTRODUODENOSCOPY);  Surgeon: Jovan David MD;  Location: [facility] ENDO ([address] [place] [address]);  Service: Endoscopy;  Laterality: N/A;  HCC. Listed for liver transplant. EGD for variceal surveillance.  cardiac clearance and blood thinenr approval received, see telephone encounter 6/23/21-BB  fully vaccinated-BB  labs same day of procedure-BB    EXCISION OF HYDROCELE Right     hemorroid surgery      hemorroidectomy      KIDNEY STONE SURGERY      LEFT HEART CATHETERIZATION N/A 1/27/2021    Procedure: Left heart cath;  Surgeon: Kris Shelton MD;  Location: [facility] CATH LAB;  Service: Cardiology;  Laterality: N/A;    liver mass removal      RIGHT HEART CATHETERIZATION Right 5/7/2021    Procedure: INSERTION, CATHETER, RIGHT HEART;  Surgeon: Jaden Schuster MD;  Location: [facility] CATH LAB;  Service: Cardiology;  Laterality: Right;    TREATMENT OF CARDIAC ARRHYTHMIA N/A 5/19/2021    Procedure: CARDIOVERSION;  Surgeon: Didier Tilley MD;  Location: [facility] EP LAB;  Service: Cardiology;  Laterality: N/A;  a fib, dccv/johny, mac, dm. sscu    TREATMENT OF CARDIAC ARRHYTHMIA N/A 5/31/2021    Procedure: CARDIOVERSION;  Surgeon: " Daniel Arambula MD;  Location: [facility] EP LAB;  Service: Cardiology;  Laterality: N/A;  afib, DCCV, anest., DM, 3 prep    TREATMENT OF CARDIAC ARRHYTHMIA N/A 2021    Procedure: Cardioversion or Defibrillation;  Surgeon: Didier Tilley MD;  Location: [facility] EP LAB;  Service: Cardiology;  Laterality: N/A;  AF, ASHISH (cancel if complaint), DCCV, MAC, DM, 3 Prep    TREATMENT OF CARDIAC ARRHYTHMIA N/A 2021    Procedure: Cardioversion or Defibrillation;  Surgeon: Didier Tilley MD;  Location: [facility] EP LAB;  Service: Cardiology;  Laterality: N/A;  AF, ASHISH (cx if complaint), DCCV, MAC, DM, 3 Prep       Social History     Socioeconomic History    Marital status:    Tobacco Use    Smoking status: Former Smoker     Quit date: 1980     Years since quittin.9    Smokeless tobacco: Never Used   Substance and Sexual Activity    Alcohol use: Not Currently    Drug use: Never     Social Determinants of Health     Financial Resource Strain: Unknown    Difficulty of Paying Living Expenses: Patient refused   Food Insecurity: Unknown    Worried About Running Out of Food in the Last Year: Patient refused    Ran Out of Food in the Last Year: Patient refused   Transportation Needs: No Transportation Needs    Lack of Transportation (Medical): No    Lack of Transportation (Non-Medical): No   Physical Activity: Unknown    Days of Exercise per Week: 0 days    Minutes of Exercise per Session: Patient refused   Stress: Stress Concern Present    Feeling of Stress : To some extent   Social Connections: Unknown    Frequency of Communication with Friends and Family: More than three times a week    Frequency of Social Gatherings with Friends and Family: Once a week    Active Member of Clubs or Organizations: Yes    Attends Club or Organization Meetings: 1 to 4 times per year    Marital Status: Living with partner   Housing Stability: Low Risk     Unable to Pay for Housing in the Last Year: No    Number of  Places Lived in the Last Year: 1    Unstable Housing in the Last Year: No           Pre-op Assessment    I have reviewed the Patient Summary Reports.     I have reviewed the Nursing Notes. I have reviewed the NPO Status.      Review of Systems  Anesthesia Hx:  No problems with previous Anesthesia    Cardiovascular:   Hypertension CAD      Pulmonary:  Pulmonary Normal    Renal/:   renal calculi    Hepatic/GI:   Liver Disease, Hepatitis    Endocrine:   Diabetes        Physical Exam  General:  Well nourished, Large Beard    Airway/Jaw/Neck:  Airway Findings: Mouth Opening: Normal Tongue: Normal  Mallampati: II  TM Distance: Normal, at least 6 cm      Dental:  Dental Findings: Upper partial dentures, Lower partial dentures    Chest/Lungs:  Chest/Lungs Findings: Normal Respiratory Rate     Heart/Vascular:  Heart Findings: Rate: Normal             Anesthesia Plan  Type of Anesthesia, risks & benefits discussed:  Anesthesia Type:  general    Patient's Preference:   Plan Factors:          Intra-op Monitoring Plan: standard ASA monitors, arterial line, central line, cardiac output and [place]  Intra-op Monitoring Plan Comments:   Post Op Pain Control Plan: multimodal analgesia and IV/PO Opioids PRN  Post Op Pain Control Plan Comments:     Induction:   IV  Beta Blocker:         Informed Consent: Patient understands risks and agrees with Anesthesia plan.  Questions answered. Anesthesia consent signed with patient.  ASA Score: 4  emergent   Day of Surgery Review of History & Physical:    H&P update referred to the surgeon.     Anesthesia Plan Notes: Discussed a-lines, CVCs, SICU admission post-op and likely staying intubated post-operatively         Ready For Surgery From Anesthesia Perspective.

## 2022-01-06 NOTE — ANESTHESIA POSTPROCEDURE EVALUATION
Anesthesia Post Evaluation    Patient: Tej Purdy    Procedure(s) Performed: Procedure(s) (LRB):  TRANSPLANT, LIVER (N/A)    Final Anesthesia Type: general      Patient location during evaluation: ICU  Patient participation: No - Unable to Participate, Intubation  Level of consciousness: sedated  Post-procedure vital signs: reviewed and stable  Pain management: adequate  Airway patency: patent    PONV status at discharge: No PONV  Anesthetic complications: no      Cardiovascular status: blood pressure returned to baseline  Respiratory status: unassisted  Hydration status: euvolemic  Follow-up not needed.          Vitals Value Taken Time   BP 85/44 01/06/22 1400   Temp 37.7 °C (99.8 °F) 01/06/22 1315   Pulse 57 01/06/22 1441   Resp 4 01/06/22 1441   SpO2 99 % 01/06/22 1441   Vitals shown include unvalidated device data.      No case tracking events are documented in the log.      Pain/Cordelia Score: No data recorded

## 2022-01-06 NOTE — H&P
[facility] - Transplant Stepdown  Liver Transplant  History & Physical    Patient Name: Tej Purdy  MRN: [id number]  Admission Date: 1/5/2022  Code Status: Full Code  Primary Care Provider: Thais Maxwell MD    Subjective:     History of Present Illness:  Mr Tej Purdy is a 67 y/o M with Afib (on coumadin), CASTILLO cirrhosis, and HCC s/p TACE and RFA who is listed for liver transplant with MELD 24. He now presents to the hospital for liver transplant. He reports being in his usual state of health. Denies fever/chills. Pre op labs and imaging pending.           Past Medical History:   Diagnosis Date    Cirrhosis 11/23/2020    Diabetes mellitus, type 2     Diabetic neuropathy 11/24/2020    Disorder of kidney and ureter     HTN (hypertension) 11/23/2020    Hyperlipidemia 11/23/2020    Kidney stones 11/23/2020    S/p lithotripsy 2020    Leukocytosis 1/15/2021    Liver mass 11/23/2020    CASTILLO (nonalcoholic steatohepatitis) 11/23/2020    PAD (peripheral artery disease)     Portal hypertension 11/23/2020    Skin cancer 11/23/2020       Past Surgical History:   Procedure Laterality Date    COLONOSCOPY  10/2020    ESOPHAGOGASTRODUODENOSCOPY  10/2020    ESOPHAGOGASTRODUODENOSCOPY N/A 7/1/2021    Procedure: EGD (ESOPHAGOGASTRODUODENOSCOPY);  Surgeon: Jovan David MD;  Location: [address]);  Service: Endoscopy;  Laterality: N/A;  HCC. Listed for liver transplant. EGD for variceal surveillance.  cardiac clearance and blood thinenr approval received, see telephone encounter 6/23/21-BB  fully vaccinated-BB  labs same day of procedure-BB    EXCISION OF HYDROCELE Right     hemorroid surgery      hemorroidectomy      KIDNEY STONE SURGERY      LEFT HEART CATHETERIZATION N/A 1/27/2021    Procedure: Left heart cath;  Surgeon: Kris Shelton MD;  Location: [facility] CATH LAB;  Service: Cardiology;  Laterality: N/A;    liver mass removal      RIGHT HEART CATHETERIZATION Right 5/7/2021    Procedure:  INSERTION, CATHETER, RIGHT HEART;  Surgeon: Jaden Schuster MD;  Location: [facility] CATH LAB;  Service: Cardiology;  Laterality: Right;    TREATMENT OF CARDIAC ARRHYTHMIA N/A 2021    Procedure: CARDIOVERSION;  Surgeon: Didier Tilley MD;  Location: [facility] EP LAB;  Service: Cardiology;  Laterality: N/A;  a fib, dccv/johny, mac, dm. sscu    TREATMENT OF CARDIAC ARRHYTHMIA N/A 2021    Procedure: CARDIOVERSION;  Surgeon: Daniel Arambula MD;  Location: [facility] EP LAB;  Service: Cardiology;  Laterality: N/A;  afib, DCCV, anest., DM, 3 prep    TREATMENT OF CARDIAC ARRHYTHMIA N/A 2021    Procedure: Cardioversion or Defibrillation;  Surgeon: Didier Tilley MD;  Location: [facility] EP LAB;  Service: Cardiology;  Laterality: N/A;  AF, JOHNY (cancel if complaint), DCCV, MAC, DM, 3 Prep    TREATMENT OF CARDIAC ARRHYTHMIA N/A 2021    Procedure: Cardioversion or Defibrillation;  Surgeon: Didier Tilley MD;  Location: [facility] EP LAB;  Service: Cardiology;  Laterality: N/A;  AF, JOHNY (cx if complaint), DCCV, MAC, DM, 3 Prep       Review of patient's allergies indicates:   Allergen Reactions    Bee pollens Swelling     BEE STINGS swells body       Family History    None       Tobacco Use    Smoking status: Former Smoker     Quit date: 1980     Years since quittin.9    Smokeless tobacco: Never Used   Substance and Sexual Activity    Alcohol use: Not Currently    Drug use: Never    Sexual activity: Not on file       No medications prior to admission.       Review of Systems   Constitutional: Negative for activity change and appetite change.   Respiratory: Negative for shortness of breath.    Cardiovascular: Negative for leg swelling.   Gastrointestinal: Negative for abdominal distention and abdominal pain.   Neurological: Negative for weakness.   Psychiatric/Behavioral: Negative for confusion.     Objective:     Vital Signs (Most Recent):    Vital Signs (24h Range):           There is no height or weight on file  to calculate BMI.    No intake or output data in the 24 hours ending 01/05/22 2128    Physical Exam  Vitals and nursing note reviewed.   Cardiovascular:      Rate and Rhythm: Normal rate and regular rhythm.      Heart sounds: No murmur heard.  No gallop.    Pulmonary:      Effort: Pulmonary effort is normal.      Breath sounds: No wheezing or rales.   Abdominal:      General: There is no distension.      Tenderness: There is no abdominal tenderness.   Neurological:      Mental Status: He is alert.   Psychiatric:         Mood and Affect: Mood normal.         Behavior: Behavior normal.         Thought Content: Thought content normal.         Judgment: Judgment normal.         Laboratory:  CBC:   Recent Labs   Lab 01/05/22 2213   WBC 5.97   RBC 4.01*   HGB 12.1*   HCT 37.2*   *   MCV 93   MCH 30.2   MCHC 32.5     CMP:   Recent Labs   Lab 01/05/22 2213   *   CALCIUM 8.9   ALBUMIN 3.8   PROT 7.1      K 4.0   CO2 24      BUN 20   CREATININE 0.8   ALKPHOS 71   ALT 24   AST 25   BILITOT 0.4     Labs within the past 24 hours have been reviewed.    Diagnostic Results:  Pre op imaging pending    Assessment/Plan:     * End stage liver disease  - Steroid induction  - Primary surgeon Dr. Garcia  - To OR 5 AM          The patient presents for liver transplant.  There are no apparent contraindications to proceeding with the planned transplant.  The patient understands that the transplant could potentially be cancelled pending detailed assessment of the donor organ.    MELD-Na score: 19 at 1/5/2022 10:13 PM  MELD score: 18 at 1/5/2022 10:13 PM  Calculated from:  Serum Creatinine: 0.8 mg/dL (Using min of 1 mg/dL) at 1/5/2022 10:13 PM  Serum Sodium: 136 mmol/L at 1/5/2022 10:13 PM  Total Bilirubin: 0.4 mg/dL (Using min of 1 mg/dL) at 1/5/2022 10:13 PM  INR(ratio): 2.8 at 1/5/2022 10:13 PM  Age: 66 years    He will receive IV steroids pulse induction.    Patient was SARS-CoV-2 /COVID-19 tested with negative  results.     A complete discussion of the transplant procedure, including risks, complications, and alternatives, as well as any donor-specific risk factors requiring specific disclosure, will be carried out by the responsible staff surgeon prior to the procedure.     Discharge Planning: Not a candidate for discharge at this time  No Patient Care Coordination Note on file.      Jane Richards PA-C  Liver Transplant  [facility] - Transplant Stepdown

## 2022-01-06 NOTE — ANESTHESIA PROCEDURE NOTES
Central Line    Diagnosis: HCC  Patient location during procedure: done in OR  Timeout: 1/6/2022 7:30 AM    Staffing  Authorizing Provider: Doug Dominguez MD  Performing Provider: Doug Dominguez MD    Staffing  Performed: anesthesiologist   Anesthesiologist: Doug Dominguez MD  Anesthesiologist was present at the time of the procedure.  Preanesthetic Checklist  Completed: patient identified, IV checked, site marked, risks and benefits discussed, surgical consent, monitors and equipment checked, pre-op evaluation, timeout performed and anesthesia consent given  Indication   Indication: vascular access, med administration     Anesthesia   general anesthesia    Central Line   Skin Prep: skin prepped with ChloraPrep, skin prep agent completely dried prior to procedure  Sterile Barriers Followed: Yes    All five maximal barriers used- gloves, gown, cap, mask, and large sterile sheet    hand hygiene performed prior to central venous catheter insertion  Location: right internal jugular.   Catheter type: triple lumen  Catheter Size: 12 Fr  Ultrasound: vascular probe with ultrasound  Vessel Caliber: medium, patent  Vascular Doppler:  not done  Needle advanced into vessel with real time Ultrasound guidance.  Guidewire confirmed in vessel.  sterile gel and probe cover used in ultrasound-guided central venous catheter insertion   Manometry: Venous cannualation confirmed by visual estimation of blood vessel pressure using manometry.  Insertion Attempts: 1   Securement:chlorhexidine patch, sterile dressing applied, line sutured and blood return through all ports    Post-Procedure   Adverse Events:none      Guidewire Guidewire removed intact.

## 2022-01-06 NOTE — HPI
Reason for Consult: Management of T2DM, Hyperglycemia     Surgical Procedure and Date: Liver Transplant 01/06/2022    Diabetes diagnosis year: 2000    Home Diabetes Medications:  per Dr. CINDA Argueta   Lantus 22 units HS  Metformin 1000 mg BID    How often checking glucose at home?  YUSUF (intubated)    BG readings on regimen: YUSUF  Hypoglycemia on the regimen? YUSUF  Missed doses on regimen? YUSUF    Diabetes Complications include:     Hyperglycemia and Diabetic peripheral neuropathy     Complicating diabetes co morbidities:   CIRRHOSIS and Active Cancer (skin), PAD, CASTILLO, HLD,       HPI:   Patient is a 66 y.o. male with a diagnosis of Afib (on coumadin), CASTILLO cirrhosis, and HCC s/p TACE and RFA who is listed for liver transplant with MELD 24. He now presents to the hospital for liver transplant on 01/06/2022. Endocrinology consulted to manage glycemic control s/p transplant surgery.     Lab Results   Component Value Date    HGBA1C 5.8 (H) 01/05/2022

## 2022-01-06 NOTE — NURSING
Pt admitted to SICU [id number] from OR s/p Liver tx intubated and sedated on Levo, Vaso, Prop, Insulin gtts. Pt connected to ventilator in room and continuous telemetry monitor. VSS. ABG/Labs drawn and sent. Liver tx resident MD Enoch and fellow MD Raymundo at bedside placing orders. Plan to wean to extubate. [facility].   Stable

## 2022-01-06 NOTE — TRANSFER OF CARE
"Anesthesia Transfer of Care Note    Patient: Tej Purdy    Procedure(s) Performed: Procedure(s) (LRB):  TRANSPLANT, LIVER (N/A)    Patient location: ICU    Anesthesia Type: general    Transport from OR: Continuous ECG monitoring in transport. Continuos invasive BP monitoring in transport. Upon arrival to PACU/ICU, patient attached to ventilator and auscultated to confirm bilateral breath sounds and adequate TV. Continuous SpO2 monitoring in transport    Post pain: adequate analgesia    Post assessment: no apparent anesthetic complications and tolerated procedure well    Post vital signs: stable    Level of consciousness: sedated    Nausea/Vomiting: no nausea/vomiting    Complications: none    Transfer of care protocol was followed      Last vitals:   Visit Vitals  BP (!) 120/58 (BP Location: Left arm, Patient Position: Sitting)   Pulse (!) 57   Temp 36.7 °C (98.1 °F) (Oral)   Resp 18   Ht 6' 3" (1.905 m)   Wt 102.5 kg (225 lb 15.5 oz)   SpO2 95%   BMI 28.24 kg/m²     "

## 2022-01-06 NOTE — ANESTHESIA PROCEDURE NOTES
Arterial    Diagnosis: ESLD  Doctor requesting consult: Radha    Patient location during procedure: done in OR  Procedure start time: 1/6/2022 7:19 AM  Timeout: 1/6/2022 7:19 AM  Procedure end time: 1/6/2022 7:23 AM    Staffing  Authorizing Provider: Doug Dominguez MD  Performing Provider: Maira Tanner CRNA    Anesthesiologist was present at the time of the procedure.    Preanesthetic Checklist  Completed: patient identified, IV checked, site marked, risks and benefits discussed, surgical consent, monitors and equipment checked, pre-op evaluation, timeout performed and anesthesia consent givenArterial  Skin Prep: chlorhexidine gluconate  Local Infiltration: none  Orientation: left  Location: radial    Catheter Size: 20 G  Catheter placement by Ultrasound guidance. Heme positive aspiration all ports.  Vessel Caliber: medium, patent, compressibility normal  Vascular Doppler:  not done  Needle advanced into vessel with real time Ultrasound guidance.  Guidewire confirmed in vessel.  Sterile sheath used.Insertion Attempts: 1  Assessment  Dressing: secured with tape and tegaderm  Patient: Tolerated well

## 2022-01-06 NOTE — OP NOTE
Certification of Assistant at Surgery       Surgery Date: 1/6/2022     Participating Surgeons:  Surgeon(s) and Role:     * Lizet Garcia MD - Primary     * Melvin Lozada MD - Resident - Assisting     * Cem Fonseca MD - Fellow    Procedures:  Procedure(s) (LRB):  TRANSPLANT, LIVER (N/A)    Assistant Surgeon's Certification of Necessity:  I understand that section 1842 (b) (6) (d) of the Social Security Act generally prohibits Medicare Part B reasonable charge payment for the services of assistants at surgery in teaching hospitals when qualified residents are available to furnish such services. I certify that the services for which payment is claimed were medically necessary, and that no qualified resident was available to perform the services. I further understand that these services are subject to post-payment review by the Medicare carrier.      Melvin Lozada MD    01/06/2022  12:22 PM

## 2022-01-06 NOTE — PROGRESS NOTES
Autotransfusion/Rapid Infusion Record:      01/06/2022  Autotransfusionist:  Sujatha Bhagat    Surgeon(s) and Role:     * Lizet Garcia MD - Primary     * Melvin Lozada MD - Resident - Assisting     * Cem Fonseca MD - Fellow  Anesthesiologist:  Doug Dominguez MD    Past Medical History:   Diagnosis Date    Cirrhosis 11/23/2020    Diabetes mellitus, type 2     Diabetic neuropathy 11/24/2020    Disorder of kidney and ureter     HTN (hypertension) 11/23/2020    Hyperlipidemia 11/23/2020    Kidney stones 11/23/2020    S/p lithotripsy 2020    Leukocytosis 1/15/2021    Liver mass 11/23/2020    CASTILLO (nonalcoholic steatohepatitis) 11/23/2020    PAD (peripheral artery disease)     Portal hypertension 11/23/2020    Skin cancer 11/23/2020       Procedure(s) (LRB):  TRANSPLANT, LIVER (N/A)     1:10 PM    Equipment:    Cell Saver     R.I.S.  : [employer] Model: CATSmart or CATSplus : [employer]   Model: [id number]     Serial number: [id number]   Serial number:    Disposable lot #:    Disposable lot #:     Were extra cardiotomies used for cell saver?  Yes   if yes, #:  1    Solutions:  Anticoagulant: ACD-A   Expiration date: 03/2023 Volume used: 1500 ml   Wash solution: 0.9% NaCl   Expiration date: 10/2024 Volume used: 7870 ml     Cell saver checklist  Time completed:           [x]   Circuit assembled correctly     [x]   Cell saver powered and operational     [x]   Vacuum connected, functional, adjust to max -150mmHg     [x]   Anticoagulant drip rate adjusted     [x]   Transfer bag properly labeled with patient name, expiration time, volume,       anticoagulant, OR number, and initials     [x]   Cell saver disinfected after use (completed at end of case)       Cell Saver volumes:    Total volume processed:     2541 mL     Total volume pRBCs recovered     847 mL     Volume pRBCs infused     847 mL         RIS checklist   Time completed:  []   RIS circuit assembled correctly     []   RIS  power and operational     []   RIS disinfected after use (completed at end of case)       RIS volumes:    Total volume infused:    (see anesthesia record for blood       product information)    mL       Additional comments:

## 2022-01-06 NOTE — TELEPHONE ENCOUNTER
ORGAN OFFER NOTE    Notified by Procurement Juanpablo Segura , , of liver offer with donor information.  Donor and recipient information read back and verified.  Spoke with Tej Purdy and identified no acute medical issues during telephone assessment.   Patient instructed to leave within 30 minutes of this phone call and report to the ER/admit office.  Patient verbalized understanding and willingness to come in for transplant.  ETA 30 minutes.    Patient was asked if they have had a positive COVID-19 test or if they have any signs or symptoms. Informed patient that they will be tested for COVID-19 upon arrival to the hospital, unless have a previous positive result. If tested and result is positive, the transplant will not be able to occur, they will be inactivated on the wait list for 28 days per protocol and required to quarantine.

## 2022-01-06 NOTE — OP NOTE
Operative Report    Date of Procedure: 1/6/2022    Surgeon: Melvin Lozada MD  First Assistant: BASSAM Chakraborty    Pre-operative Diagnosis: Allograft liver for transplantation  Post-operative Diagnosis: Same    Procedure(s) Performed:   1. Back Table Preparation of Liver with needle biopsy vascular reconstruction of replaced left hepatic artery.    Anesthesia: Not applicable  Estimated Blood Loss: Not applicable  Fluids Administered: Not applicable    Findings: Estimated steatosis 0-10%, replaced left hepatic artery.  Drains: Not applicable  Specimens: Core biopsy of allograft liver      Preamble  Indications: This report describes only the backbench preparation of the liver prior to transplantation.  The transplant operation itself is described in a separate report.    ABO Confirmation: Immediately following arrival of the donor organ and prior to implantation, a formal ABO confirmation was done according to hospital and UNOS policies.  I confirmed the UNOS ID number of the donor organ and the donor and recipient ABO types, directly verifying these data by comparison with the UNOS [facility] report.  This confirmation was personally done by an attending surgeon and circulating nurse, and is officially documented elsewhere.    Time-Out: A complete time out was carried out prior to the procedure, with confirmation of patient identity, correct procedure, correct operative site, appropriate antibiotic prophylaxis, review of any known allergies, and presence of all needed equipment.    Procedure in Detail  The liver was recovered from the transport cooler and inspected for vascular anatomy and overall suitability for transplantation, with findings as noted above.  The remnant of the diaphragm was dissected away.  The phrenic veins and adrenal vein were identified and ligated or sutured as appropriate.  The portal vein and hepatic artery were mobilized toward the hilum of the liver, and extrahepatic branches were  ligated.  Replaced left hepatic artery was reconstructed to the common hepatic artery in an [id number]-fashion using 7-0 Prolene suture. .  The vessels were tested for leaks.  A needle biopsy was obtained for permanent section histology using a spring-loaded biopsy device. The liver was kept in ice temperature organ preservation solution until the time of implantation.

## 2022-01-06 NOTE — PLAN OF CARE
Pre-operative Discussion Note  Liver Transplant Surgery    Tej Prudy is a 66 y.o. male admitted for liver transplant.  I discussed the planned procedure in detail, including expected hospital course and outcomes, benefits, risks, and potential complications.  Complications discussed included death, graft failure, bleeding, infection, rejection, and neurologic problems.  I discussed the risks of anesthesia, as well as the potential need for re-operation.  The possibility of other complications not specifically mentioned was also discussed.  Also, I discussed the need for lifelong immunosuppression and the possibility of serious complications from immunosuppressive drugs.    The discussion included the risks that the patient will incur if he elects to not have the proposed procedure.    Relevant donor-specific risk factors were disclosed and discussed with the patient, including:   none    Specific PHS donor risk criteria for the organ donor include:  None    HCV Infection Not applicable.    Hepatocellular Carcinoma Recurrence: I explained that the transplant procedure would begin with a search for extrahepatic tumor, and if such tumor is found, the transplant operation will be aborted.  If there is no detectable tumor outside the liver and the transplant proceeds as planned, there is still a risk of tumor recurrence following transplant, which can be fatal.    The patient was SARS-CoV-2 /COVID-19 tested with negative results.    COVID-19: I discussed the possibility of COVID-19 transmission with the patient. Although JAYLIN testing is available for the virus using a technique which in theory should be very accurate, there is no data yet regarding the likelihood of a  false-negative test leading to virus transmission. Based on accuracy of testing for other viruses, it is expected that this risk is extremely small.     I also discussed that transplant immunosuppression will increase susceptibility to COVID-19 and  other viruses, and that although we use stringent precautions to protect patients from infection, it is possible for a transplant recipient to contract this infection. If COVID-19 infection should occur, it would be a serious matter with a significant risk of death.    All questions were answered.  The patient and available family members voice understanding and agree to proceed with the transplant.    UNOS Patient Status  Note on scores:  ICU = 10 = total assistance  TSU = 20-30 = partial assistance  Outpatient admitted for transplant requiring medical care in last year = 40-50 = partial assistance  Scores 60 or higher indicate no assistance, meaning no need for medical care in last year. This would be very unusual for a transplant candidate.    Functional Status: 50% - Requires considerable assistance and frequent medical care  Physical Capacity: No Limitations

## 2022-01-06 NOTE — SUBJECTIVE & OBJECTIVE
Past Medical History:   Diagnosis Date    Cirrhosis 11/23/2020    Diabetes mellitus, type 2     Diabetic neuropathy 11/24/2020    Disorder of kidney and ureter     HTN (hypertension) 11/23/2020    Hyperlipidemia 11/23/2020    Kidney stones 11/23/2020    S/p lithotripsy 2020    Leukocytosis 1/15/2021    Liver mass 11/23/2020    CASTILLO (nonalcoholic steatohepatitis) 11/23/2020    PAD (peripheral artery disease)     Portal hypertension 11/23/2020    Skin cancer 11/23/2020       Past Surgical History:   Procedure Laterality Date    COLONOSCOPY  10/2020    ESOPHAGOGASTRODUODENOSCOPY  10/2020    ESOPHAGOGASTRODUODENOSCOPY N/A 7/1/2021    Procedure: EGD (ESOPHAGOGASTRODUODENOSCOPY);  Surgeon: Jovan David MD;  Location: [address]);  Service: Endoscopy;  Laterality: N/A;  HCC. Listed for liver transplant. EGD for variceal surveillance.  cardiac clearance and blood thinenr approval received, see telephone encounter 6/23/21-BB  fully vaccinated-BB  labs same day of procedure-BB    EXCISION OF HYDROCELE Right     hemorroid surgery      hemorroidectomy      KIDNEY STONE SURGERY      LEFT HEART CATHETERIZATION N/A 1/27/2021    Procedure: Left heart cath;  Surgeon: Kris Shelton MD;  Location: [facility] CATH LAB;  Service: Cardiology;  Laterality: N/A;    liver mass removal      RIGHT HEART CATHETERIZATION Right 5/7/2021    Procedure: INSERTION, CATHETER, RIGHT HEART;  Surgeon: Jaden Schuster MD;  Location: [facility] CATH LAB;  Service: Cardiology;  Laterality: Right;    TREATMENT OF CARDIAC ARRHYTHMIA N/A 5/19/2021    Procedure: CARDIOVERSION;  Surgeon: Didier Tilley MD;  Location: [facility] EP LAB;  Service: Cardiology;  Laterality: N/A;  a fib, dccv/johny, mac, dm. sscu    TREATMENT OF CARDIAC ARRHYTHMIA N/A 5/31/2021    Procedure: CARDIOVERSION;  Surgeon: Daniel Arambula MD;  Location: [facility] EP LAB;  Service: Cardiology;  Laterality: N/A;  afib, DCCV, anest., DM, 3 prep    TREATMENT OF CARDIAC  ARRHYTHMIA N/A 2021    Procedure: Cardioversion or Defibrillation;  Surgeon: Didier Tilley MD;  Location: [facility] EP LAB;  Service: Cardiology;  Laterality: N/A;  AF, ASHISH (cancel if complaint), DCCV, MAC, DM, 3 Prep    TREATMENT OF CARDIAC ARRHYTHMIA N/A 2021    Procedure: Cardioversion or Defibrillation;  Surgeon: Didier Tilley MD;  Location: [facility] EP LAB;  Service: Cardiology;  Laterality: N/A;  AF, ASHISH (cx if complaint), DCCV, MAC, DM, 3 Prep       Review of patient's allergies indicates:   Allergen Reactions    Bee pollens Swelling     BEE STINGS swells body       Family History    None       Tobacco Use    Smoking status: Former Smoker     Quit date: 1980     Years since quittin.9    Smokeless tobacco: Never Used   Substance and Sexual Activity    Alcohol use: Not Currently    Drug use: Never    Sexual activity: Not on file       No medications prior to admission.       Review of Systems   Constitutional: Negative for activity change and appetite change.   Respiratory: Negative for shortness of breath.    Cardiovascular: Negative for leg swelling.   Gastrointestinal: Negative for abdominal distention and abdominal pain.   Neurological: Negative for weakness.   Psychiatric/Behavioral: Negative for confusion.     Objective:     Vital Signs (Most Recent):    Vital Signs (24h Range):           There is no height or weight on file to calculate BMI.    No intake or output data in the 24 hours ending 22    Physical Exam  Vitals and nursing note reviewed.   Cardiovascular:      Rate and Rhythm: Normal rate and regular rhythm.      Heart sounds: No murmur heard.  No gallop.    Pulmonary:      Effort: Pulmonary effort is normal.      Breath sounds: No wheezing or rales.   Abdominal:      General: There is no distension.      Tenderness: There is no abdominal tenderness.   Neurological:      Mental Status: He is alert.   Psychiatric:         Mood and Affect: Mood normal.          Behavior: Behavior normal.         Thought Content: Thought content normal.         Judgment: Judgment normal.         Laboratory:  CBC:   Recent Labs   Lab 01/05/22  2213   WBC 5.97   RBC 4.01*   HGB 12.1*   HCT 37.2*   *   MCV 93   MCH 30.2   MCHC 32.5     CMP:   Recent Labs   Lab 01/05/22  2213   *   CALCIUM 8.9   ALBUMIN 3.8   PROT 7.1      K 4.0   CO2 24      BUN 20   CREATININE 0.8   ALKPHOS 71   ALT 24   AST 25   BILITOT 0.4     Labs within the past 24 hours have been reviewed.    Diagnostic Results:  Pre op imaging pending

## 2022-01-06 NOTE — SUBJECTIVE & OBJECTIVE
"Interval HPI:   Overnight events:  Patient Intubated in SICU. BG above goal with elevated insulin infusion rates. Endo will continue to follow, and manage glycemic control while inpatient.   Diet NPO Except for: Sips with Medication  Day of Surgery    Eating:   NPO  Nausea: No  Hypoglycemia and intervention: No  Fever: No  TPN and/or TF: No  If yes, type of TF/TPN and rate: None    PMH, PSH, FH, SH updated and reviewed     ROS:  Unable to obtain due to: Sedation,Intubation,Altered mental status,Critical illness,Reviewed ROS from note dated 01/05/2021 per primary team.       Current Medications and/or Treatments Impacting Glycemic Control  Immunotherapy:    Immunosuppressants         Stop Route Frequency     tacrolimus (PROGRAF) 1 mg/mL oral syringe         -- Oral 2 times daily     mycophenolate mofetil 200 mg/mL suspension 1,000 mg         -- Oral 2 times daily        Steroids:   Hormones (From admission, onward)            Start     Stop Route Frequency Ordered    01/12/22 0900  predniSONE tablet 20 mg  (methylprednisolone taper panel)        "Followed by" Linked Group Details    -- Oral Daily 01/06/22 1314    01/11/22 0900  methylPREDNISolone sodium succinate injection 40 mg  (methylprednisolone taper panel)        "Followed by" Linked Group Details    01/12 0859 IV Daily 01/06/22 1314    01/10/22 0900  methylPREDNISolone sodium succinate injection 80 mg  (methylprednisolone taper panel)        "Followed by" Linked Group Details    01/11 0859 IV Daily 01/06/22 1314    01/09/22 0900  methylPREDNISolone sodium succinate injection 120 mg  (methylprednisolone taper panel)        "Followed by" Linked Group Details    01/10 0859 IV Daily 01/06/22 1314    01/08/22 0900  methylPREDNISolone sodium succinate injection 160 mg  (methylprednisolone taper panel)        "Followed by" Linked Group Details    01/09 0859 IV Daily 01/06/22 1314    01/07/22 0900  methylPREDNISolone sodium succinate injection 200 mg  " "(methylprednisolone taper panel)        "Followed by" Linked Group Details    01/08 0859 IV Daily 01/06/22 1314    01/06/22 1445  vasopressin (PITRESSIN) 0.2 Units/mL in dextrose 5 % 100 mL infusion         -- IV Continuous 01/06/22 1332        Pressors:    Autonomic Drugs (From admission, onward)            Start     Stop Route Frequency Ordered    01/06/22 1445  NORepinephrine 4 mg in dextrose 5% 250 mL infusion (premix) (titrating)        Question Answer Comment   Begin at (in mcg/kg/min): 0.06    Titrate by: (in mcg/kg/min) 0.01    Titrate interval: (in minutes) 10    Titrate to maintain: (MAP or SBP) MAP    Greater than: (in mmHg) 65    Maximum dose: (in mcg/kg/min) 0.15        -- IV Continuous 01/06/22 1332        Hyperglycemia/Diabetes Medications:   Antihyperglycemics (From admission, onward)            Start     Stop Route Frequency Ordered    01/06/22 1500  insulin regular in 0.9 % NaCl 100 unit/100 mL (1 unit/mL) infusion        Question:  Enter initial dose from Infusion Protocol Chart (Units/hr):  Answer:  6    -- IV Continuous 01/06/22 1350             PHYSICAL EXAMINATION:  Vitals:    01/06/22 1406   BP:    Pulse: 60   Resp: (!) 6   Temp:      Body mass index is 28.24 kg/m².    Physical Exam   Constitutional:  Well developed, well nourished, NAD.  ENT: External ears no masses with nose patent  Neck:  Supple; trachea midline  Cardiovascular: Normal heart sounds, no LE edema.     Lungs:  Normal effort; lungs anterior bilaterally clear to auscultation.  Intubated on a ventilator.  Abdomen:  Soft, no masses, no hernias. Hypoactive BS noted.  MS: No clubbing or cyanosis of nails noted; unable to assess gait.  Skin: No rashes, lesions, or ulcers; no nodules.  Injection sites are ok. No lipo hypertropthy or atrophy.  Chevron abdominal incision with dressing; DANNIELLE x 2 to RLQ.  Psychiatric: YUSUF  Neurological: YUSUF  Foot: Nails in good condition, no amputations noted.  "

## 2022-01-06 NOTE — PROGRESS NOTES
Admit Note/Transplant Note    Met with significant other to assess patients needs due to pt being intubated and in recovery s/p liver transplant. Pt's s/o, Reena, is at home and will be here later today.  Patient is a 66 y.o.  male, admitted for liver transplant.     Patient admitted from home on 1/5/2022 .  At this time, patient presents as intubated.  At this time, patients caregiver presents as alert and oriented x 4 and communicative.      Household/Family Systems (as reported by patients caregiver)     Patient resides with patient's significant other, at:   [address]  [address]  [place] [address]   This is a 2nd floor apt, pt has had no issues getting up and down the stairs prior to this admission.    Support system includes s/o, reena and son, Addison Purdy.  Patient does not have dependents that are need of being cared for.   Reena, c: [phone]  Addisonrodriguez Purdy, pt son, c: [phone]     Patients primary caregiver is Reena, patients significant other, phone number [phone].  Confirmed patients contact information is [phone] (home); Pt's cell.  Telephone Information:   Mobile [phone]     During admission, patient's caregiver plans to stay at home/in pt's room.  Confirmed patient and patients caregivers do have access to reliable transportation.    Cognitive Status/Learning     Patients caregiver reports patients reading ability as college and states patient does have difficulty with seeing and wears glasses.  Patients caregiver reports patient learns best by reading/hand on.   Needed: No.   Highest education level: Associate/Bachelor Degree    Vocation/Disability (as reported by patients caregiver)    Working for Income: No  If no, reason not working: Patient Choice - Retired  Patient is retired. Pt previously worked as a teacher in [employer].    Adherence     Patients caregiver reports patient has a high level of adherence to patients health care  regimen.  Adherence counseling and education provided.  Patient's caregiver verbalizes understanding.    Substance Use    Patients caregiver reports patients substance usage as the following:    Tobacco: none, patient denies any use.  Alcohol: No use since , pt drank socially in the past.  Illicit Drugs/Non-prescribed Medications: none, patient denies any use.  Patients caregiver states clear understanding of the potential impact of substance use.  Substance abstinence/cessation counseling, education and resources provided and reviewed.     Services Utilizing/ADLS (as reported by patients caregiver)    Infusion Service: Prior to admission, patient utilizing? no, [facility] in the past  Home Health: Prior to admission, patient utilizing? no, [facility] in the past  DME: Prior to admission, no  Pulmonary/Cardiac Rehab: Prior to admission, no  Dialysis:  Prior to admission, no  Transplant Specialty Pharmacy:  Prior to admission, no.    Prior to admission, patients caregiver reports patient was independent with ADLS and was driving.  Patients caregiver reports patient is not able to care for self at this time due to compromised medical condition (as documented in medical record) and physical weakness.  Patients caregiver reports patient indicates a willingness to care for self once medically cleared to do so.    Insurance/Medications    Insured by   Payer/Plan Subscr  Sex Relation Sub. Ins. ID Effective Group Num   1. [employer]* KENYA VEGA 1955 Male Self [id number]      Primary Insurance (for UNOS reporting): Public Insurance - Medicare & Choice  Secondary Insurance (for UNOS reporting): None    Patients caregiver reports patient is able to obtain and afford medications at this time and at time of discharge.    Living Will/Healthcare Power of     Patients caregiver reports patient does not have a LW and/or HCPA.    provided education regarding LW and HCPA and the completion of forms.    Coping/Mental Health (as reported by patients caregiver)    Patient is coping adequately with the aid of  family members. Patients caregiver is coping adequately with the aid of  family members.      Discharge Planning (as reported by patients caregiver)    At time of discharge, patient plans to return to patient's home under the care of self, s/o and son.  Patients significant other will transport patient.  Per rounds today, expected discharge date has not been medically determined at this time. Patients caretaker verbalizes understanding and is involved in treatment planning and discharge process.    Additional Concerns    Patient's caretaker denies additional needs and/or concerns at this time.  providing ongoing psychosocial support, education, resources and d/c planning as needed.  SW remains available.  remains available.

## 2022-01-06 NOTE — ASSESSMENT & PLAN NOTE
BG goal 140 - 180     - Continue IV insulin infusion protocol  - Requires intensive BG monitoring while on protocol   - BG check q1hr    ** Please call Endocrine for any BG related issues **  ** Please notify Endocrine for any change and/or advance in diet**  Lab Results   Component Value Date    HGBA1C 5.8 (H) 01/05/2022       Discharge planning: YANIRA

## 2022-01-06 NOTE — RESPIRATORY THERAPY
Received patient from OR. Placed on ventilator with documented settings. ABG done. Will continue to monitor.

## 2022-01-06 NOTE — PLAN OF CARE
POC reviewed with pt and pt S.O. at the bedside. Pt extubated, on 3 L NC, SpO2 98%. ABGs trended q4hr, lactic trending down. HR NSR/sinus cornel 50s-60s. SBP maintained 100-160, Levo and Vaso weaned to off. 1 L Albumin given for low CVP and hypotension with improvement noted. Accuchecks performed q1hr, Insulin gtt titrated per nomogram. TMAX 99.8. Pt AAOx4, follows commands, moves all extremities. 2 DANNIELLE drains with minimal bloody output-see assessment flowsheets for more details. Alvares UOP ~200-500 cc/hr, ~2 L/shift. Labs trended and electrolytes replaced per order. AST still trending up, MD aware. Skin: foams applied to heels and sacrum, waffle mattress inflated, chevron incision with island/border and DANNIELLE drain dsgs CDI, no skin breakdown noted.

## 2022-01-06 NOTE — PROGRESS NOTES
Notified K ZAFAR emerson of pts arrival to floor. Oriented to room, visitor restriction and call bell. Covid swab sent.

## 2022-01-06 NOTE — ANESTHESIA PROCEDURE NOTES
Intubation    Date/Time: 1/6/2022 7:16 AM  Performed by: Bev Quintana CRNA  Authorized by: Doug Dominguez MD     Intubation:     Induction:  Intravenous    Intubated:  Postinduction    Mask Ventilation:  Moderately difficult with oral airway    Attempts:  1    Attempted By:  CRNA    Method of Intubation:  Video laryngoscopy    Blade:  Glidescope 3    Laryngeal View Grade: Grade I - full view of cords      Difficult Airway Encountered?: No      Complications:  None    Airway Device:  Oral endotracheal tube    Airway Device Size:  7.5    Style/Cuff Inflation:  Cuffed (inflated to minimal occlusive pressure)    Tube secured:  23    Secured at:  The lips    Placement Verified By:  Capnometry    Complicating Factors:  None    Findings Post-Intubation:  BS equal bilateral and atraumatic/condition of teeth unchanged

## 2022-01-06 NOTE — CARE UPDATE
Advanced ETT 2cm from 23 to 25 at the lip per MD. Patient displayed no apparent distress. Receiving appropriate tidal volumes. Bilateral breath sounds. Will continue to monitor.

## 2022-01-07 LAB
ALBUMIN SERPL BCP-MCNC: 3.6 G/DL (ref 3.5–5.2)
ALBUMIN SERPL BCP-MCNC: 3.9 G/DL (ref 3.5–5.2)
ALLENS TEST: ABNORMAL
ALLENS TEST: ABNORMAL
ALP SERPL-CCNC: 55 U/L (ref 55–135)
ALP SERPL-CCNC: 56 U/L (ref 55–135)
ALT SERPL W/O P-5'-P-CCNC: 507 U/L (ref 10–44)
ALT SERPL W/O P-5'-P-CCNC: 533 U/L (ref 10–44)
ANION GAP SERPL CALC-SCNC: 11 MMOL/L (ref 8–16)
ANION GAP SERPL CALC-SCNC: 11 MMOL/L (ref 8–16)
ANION GAP SERPL CALC-SCNC: 13 MMOL/L (ref 8–16)
APTT BLDCRRT: 31.9 SEC (ref 21–32)
AST SERPL-CCNC: 448 U/L (ref 10–40)
AST SERPL-CCNC: 549 U/L (ref 10–40)
AST SERPL-CCNC: 655 U/L (ref 10–40)
AST SERPL-CCNC: 671 U/L (ref 10–40)
AST SERPL-CCNC: 792 U/L (ref 10–40)
AST SERPL-CCNC: 816 U/L (ref 10–40)
BACTERIA UR CULT: NORMAL
BASOPHILS # BLD AUTO: 0.01 K/UL (ref 0–0.2)
BASOPHILS NFR BLD: 0.1 % (ref 0–1.9)
BILIRUB DIRECT SERPL-MCNC: 1.1 MG/DL (ref 0.1–0.3)
BILIRUB SERPL-MCNC: 2.6 MG/DL (ref 0.1–1)
BILIRUB SERPL-MCNC: 2.6 MG/DL (ref 0.1–1)
BUN SERPL-MCNC: 18 MG/DL (ref 8–23)
BUN SERPL-MCNC: 19 MG/DL (ref 8–23)
BUN SERPL-MCNC: 19 MG/DL (ref 8–23)
CA-I BLDV-SCNC: 1.01 MMOL/L (ref 1.06–1.42)
CALCIUM SERPL-MCNC: 8.1 MG/DL (ref 8.7–10.5)
CALCIUM SERPL-MCNC: 8.4 MG/DL (ref 8.7–10.5)
CALCIUM SERPL-MCNC: 8.7 MG/DL (ref 8.7–10.5)
CHLORIDE SERPL-SCNC: 103 MMOL/L (ref 95–110)
CHLORIDE SERPL-SCNC: 103 MMOL/L (ref 95–110)
CHLORIDE SERPL-SCNC: 105 MMOL/L (ref 95–110)
CO2 SERPL-SCNC: 24 MMOL/L (ref 23–29)
CO2 SERPL-SCNC: 26 MMOL/L (ref 23–29)
CO2 SERPL-SCNC: 27 MMOL/L (ref 23–29)
CREAT SERPL-MCNC: 1 MG/DL (ref 0.5–1.4)
CREAT SERPL-MCNC: 1.1 MG/DL (ref 0.5–1.4)
CREAT SERPL-MCNC: 1.2 MG/DL (ref 0.5–1.4)
DELSYS: ABNORMAL
DELSYS: ABNORMAL
DIFFERENTIAL METHOD: ABNORMAL
EOSINOPHIL # BLD AUTO: 0 K/UL (ref 0–0.5)
EOSINOPHIL NFR BLD: 0 % (ref 0–8)
ERYTHROCYTE [DISTWIDTH] IN BLOOD BY AUTOMATED COUNT: 14.2 % (ref 11.5–14.5)
ERYTHROCYTE [DISTWIDTH] IN BLOOD BY AUTOMATED COUNT: 14.3 % (ref 11.5–14.5)
ERYTHROCYTE [DISTWIDTH] IN BLOOD BY AUTOMATED COUNT: 14.4 % (ref 11.5–14.5)
ERYTHROCYTE [DISTWIDTH] IN BLOOD BY AUTOMATED COUNT: 14.6 % (ref 11.5–14.5)
ERYTHROCYTE [DISTWIDTH] IN BLOOD BY AUTOMATED COUNT: 14.8 % (ref 11.5–14.5)
EST. GFR  (AFRICAN AMERICAN): >60 ML/MIN/1.73 M^2
EST. GFR  (NON AFRICAN AMERICAN): >60 ML/MIN/1.73 M^2
GLUCOSE SERPL-MCNC: 152 MG/DL (ref 70–110)
GLUCOSE SERPL-MCNC: 154 MG/DL (ref 70–110)
GLUCOSE SERPL-MCNC: 157 MG/DL (ref 70–110)
HCO3 UR-SCNC: 29.3 MMOL/L (ref 24–28)
HCO3 UR-SCNC: 30.6 MMOL/L (ref 24–28)
HCT VFR BLD AUTO: 33.8 % (ref 40–54)
HCT VFR BLD AUTO: 34.4 % (ref 40–54)
HCT VFR BLD AUTO: 34.9 % (ref 40–54)
HCT VFR BLD AUTO: 35.3 % (ref 40–54)
HCT VFR BLD AUTO: 36.9 % (ref 40–54)
HGB BLD-MCNC: 11.3 G/DL (ref 14–18)
HGB BLD-MCNC: 11.3 G/DL (ref 14–18)
HGB BLD-MCNC: 11.6 G/DL (ref 14–18)
HGB BLD-MCNC: 11.8 G/DL (ref 14–18)
HGB BLD-MCNC: 12.1 G/DL (ref 14–18)
IMM GRANULOCYTES # BLD AUTO: 0.03 K/UL (ref 0–0.04)
IMM GRANULOCYTES # BLD AUTO: 0.03 K/UL (ref 0–0.04)
IMM GRANULOCYTES # BLD AUTO: 0.05 K/UL (ref 0–0.04)
IMM GRANULOCYTES # BLD AUTO: 0.06 K/UL (ref 0–0.04)
IMM GRANULOCYTES # BLD AUTO: 0.07 K/UL (ref 0–0.04)
IMM GRANULOCYTES NFR BLD AUTO: 0.3 % (ref 0–0.5)
IMM GRANULOCYTES NFR BLD AUTO: 0.3 % (ref 0–0.5)
IMM GRANULOCYTES NFR BLD AUTO: 0.5 % (ref 0–0.5)
IMM GRANULOCYTES NFR BLD AUTO: 0.5 % (ref 0–0.5)
IMM GRANULOCYTES NFR BLD AUTO: 0.6 % (ref 0–0.5)
INR PPP: 1.3 (ref 0.8–1.2)
INR PPP: 1.4 (ref 0.8–1.2)
INR PPP: 1.6 (ref 0.8–1.2)
LYMPHOCYTES # BLD AUTO: 0.2 K/UL (ref 1–4.8)
LYMPHOCYTES NFR BLD: 1.3 % (ref 18–48)
LYMPHOCYTES NFR BLD: 1.9 % (ref 18–48)
LYMPHOCYTES NFR BLD: 2 % (ref 18–48)
LYMPHOCYTES NFR BLD: 2.3 % (ref 18–48)
LYMPHOCYTES NFR BLD: 2.3 % (ref 18–48)
MAGNESIUM SERPL-MCNC: 1.2 MG/DL (ref 1.6–2.6)
MCH RBC QN AUTO: 29.2 PG (ref 27–31)
MCH RBC QN AUTO: 29.7 PG (ref 27–31)
MCH RBC QN AUTO: 29.8 PG (ref 27–31)
MCH RBC QN AUTO: 30 PG (ref 27–31)
MCH RBC QN AUTO: 30.4 PG (ref 27–31)
MCHC RBC AUTO-ENTMCNC: 32.8 G/DL (ref 32–36)
MCHC RBC AUTO-ENTMCNC: 32.8 G/DL (ref 32–36)
MCHC RBC AUTO-ENTMCNC: 32.9 G/DL (ref 32–36)
MCHC RBC AUTO-ENTMCNC: 33.4 G/DL (ref 32–36)
MCHC RBC AUTO-ENTMCNC: 33.8 G/DL (ref 32–36)
MCV RBC AUTO: 88 FL (ref 82–98)
MCV RBC AUTO: 89 FL (ref 82–98)
MCV RBC AUTO: 89 FL (ref 82–98)
MCV RBC AUTO: 92 FL (ref 82–98)
MCV RBC AUTO: 92 FL (ref 82–98)
MODE: ABNORMAL
MODE: ABNORMAL
MONOCYTES # BLD AUTO: 0.5 K/UL (ref 0.3–1)
MONOCYTES # BLD AUTO: 0.6 K/UL (ref 0.3–1)
MONOCYTES # BLD AUTO: 0.7 K/UL (ref 0.3–1)
MONOCYTES NFR BLD: 5.1 % (ref 4–15)
MONOCYTES NFR BLD: 5.2 % (ref 4–15)
MONOCYTES NFR BLD: 5.7 % (ref 4–15)
MONOCYTES NFR BLD: 5.9 % (ref 4–15)
MONOCYTES NFR BLD: 6.2 % (ref 4–15)
NEUTROPHILS # BLD AUTO: 10.1 K/UL (ref 1.8–7.7)
NEUTROPHILS # BLD AUTO: 13.4 K/UL (ref 1.8–7.7)
NEUTROPHILS # BLD AUTO: 8.2 K/UL (ref 1.8–7.7)
NEUTROPHILS # BLD AUTO: 9.2 K/UL (ref 1.8–7.7)
NEUTROPHILS # BLD AUTO: 9.3 K/UL (ref 1.8–7.7)
NEUTROPHILS NFR BLD: 91.1 % (ref 38–73)
NEUTROPHILS NFR BLD: 91.5 % (ref 38–73)
NEUTROPHILS NFR BLD: 91.8 % (ref 38–73)
NEUTROPHILS NFR BLD: 92 % (ref 38–73)
NEUTROPHILS NFR BLD: 93 % (ref 38–73)
NRBC BLD-RTO: 0 /100 WBC
PCO2 BLDA: 45.3 MMHG (ref 35–45)
PCO2 BLDA: 47.6 MMHG (ref 35–45)
PH SMN: 7.42 [PH] (ref 7.35–7.45)
PH SMN: 7.42 [PH] (ref 7.35–7.45)
PLATELET # BLD AUTO: 68 K/UL (ref 150–450)
PLATELET # BLD AUTO: 73 K/UL (ref 150–450)
PLATELET # BLD AUTO: 92 K/UL (ref 150–450)
PMV BLD AUTO: 11.1 FL (ref 9.2–12.9)
PMV BLD AUTO: 11.5 FL (ref 9.2–12.9)
PMV BLD AUTO: 11.6 FL (ref 9.2–12.9)
PMV BLD AUTO: 11.7 FL (ref 9.2–12.9)
PMV BLD AUTO: 12 FL (ref 9.2–12.9)
PO2 BLDA: 74 MMHG (ref 80–100)
PO2 BLDA: 80 MMHG (ref 80–100)
POC BE: 5 MMOL/L
POC BE: 6 MMOL/L
POC SATURATED O2: 95 % (ref 95–100)
POC SATURATED O2: 96 % (ref 95–100)
POC TCO2: 31 MMOL/L (ref 23–27)
POC TCO2: 32 MMOL/L (ref 23–27)
POCT GLUCOSE: 147 MG/DL (ref 70–110)
POCT GLUCOSE: 149 MG/DL (ref 70–110)
POCT GLUCOSE: 151 MG/DL (ref 70–110)
POCT GLUCOSE: 153 MG/DL (ref 70–110)
POCT GLUCOSE: 157 MG/DL (ref 70–110)
POCT GLUCOSE: 159 MG/DL (ref 70–110)
POCT GLUCOSE: 161 MG/DL (ref 70–110)
POCT GLUCOSE: 165 MG/DL (ref 70–110)
POCT GLUCOSE: 180 MG/DL (ref 70–110)
POCT GLUCOSE: 225 MG/DL (ref 70–110)
POTASSIUM SERPL-SCNC: 4.2 MMOL/L (ref 3.5–5.1)
POTASSIUM SERPL-SCNC: 4.4 MMOL/L (ref 3.5–5.1)
POTASSIUM SERPL-SCNC: 4.7 MMOL/L (ref 3.5–5.1)
PROT SERPL-MCNC: 5.4 G/DL (ref 6–8.4)
PROT SERPL-MCNC: 5.7 G/DL (ref 6–8.4)
PROTHROMBIN TIME: 14.1 SEC (ref 9–12.5)
PROTHROMBIN TIME: 15.2 SEC (ref 9–12.5)
PROTHROMBIN TIME: 17 SEC (ref 9–12.5)
RBC # BLD AUTO: 3.8 M/UL (ref 4.6–6.2)
RBC # BLD AUTO: 3.82 M/UL (ref 4.6–6.2)
RBC # BLD AUTO: 3.87 M/UL (ref 4.6–6.2)
RBC # BLD AUTO: 3.96 M/UL (ref 4.6–6.2)
RBC # BLD AUTO: 4.03 M/UL (ref 4.6–6.2)
SAMPLE: ABNORMAL
SAMPLE: ABNORMAL
SITE: ABNORMAL
SITE: ABNORMAL
SODIUM SERPL-SCNC: 140 MMOL/L (ref 136–145)
SODIUM SERPL-SCNC: 141 MMOL/L (ref 136–145)
SODIUM SERPL-SCNC: 142 MMOL/L (ref 136–145)
TACROLIMUS BLD-MCNC: 4.9 NG/ML (ref 5–15)
WBC # BLD AUTO: 10.08 K/UL (ref 3.9–12.7)
WBC # BLD AUTO: 10.12 K/UL (ref 3.9–12.7)
WBC # BLD AUTO: 10.97 K/UL (ref 3.9–12.7)
WBC # BLD AUTO: 14.4 K/UL (ref 3.9–12.7)
WBC # BLD AUTO: 9.03 K/UL (ref 3.9–12.7)

## 2022-01-07 PROCEDURE — 82330 ASSAY OF CALCIUM: CPT | Performed by: TRANSPLANT SURGERY

## 2022-01-07 PROCEDURE — 83735 ASSAY OF MAGNESIUM: CPT | Performed by: TRANSPLANT SURGERY

## 2022-01-07 PROCEDURE — 25000003 PHARM REV CODE 250: Performed by: STUDENT IN AN ORGANIZED HEALTH CARE EDUCATION/TRAINING PROGRAM

## 2022-01-07 PROCEDURE — 97530 THERAPEUTIC ACTIVITIES: CPT

## 2022-01-07 PROCEDURE — 85025 COMPLETE CBC W/AUTO DIFF WBC: CPT | Performed by: STUDENT IN AN ORGANIZED HEALTH CARE EDUCATION/TRAINING PROGRAM

## 2022-01-07 PROCEDURE — 84450 TRANSFERASE (AST) (SGOT): CPT | Performed by: STUDENT IN AN ORGANIZED HEALTH CARE EDUCATION/TRAINING PROGRAM

## 2022-01-07 PROCEDURE — 99232 PR SUBSEQUENT HOSPITAL CARE,LEVL II: ICD-10-PCS | Mod: ,,, | Performed by: NURSE PRACTITIONER

## 2022-01-07 PROCEDURE — 82803 BLOOD GASES ANY COMBINATION: CPT

## 2022-01-07 PROCEDURE — 99232 SBSQ HOSP IP/OBS MODERATE 35: CPT | Mod: ,,, | Performed by: NURSE PRACTITIONER

## 2022-01-07 PROCEDURE — 63600175 PHARM REV CODE 636 W HCPCS: Performed by: STUDENT IN AN ORGANIZED HEALTH CARE EDUCATION/TRAINING PROGRAM

## 2022-01-07 PROCEDURE — 94761 N-INVAS EAR/PLS OXIMETRY MLT: CPT

## 2022-01-07 PROCEDURE — 85610 PROTHROMBIN TIME: CPT | Mod: 91 | Performed by: STUDENT IN AN ORGANIZED HEALTH CARE EDUCATION/TRAINING PROGRAM

## 2022-01-07 PROCEDURE — 85730 THROMBOPLASTIN TIME PARTIAL: CPT | Performed by: STUDENT IN AN ORGANIZED HEALTH CARE EDUCATION/TRAINING PROGRAM

## 2022-01-07 PROCEDURE — 25000003 PHARM REV CODE 250: Performed by: NURSE PRACTITIONER

## 2022-01-07 PROCEDURE — 82248 BILIRUBIN DIRECT: CPT | Performed by: STUDENT IN AN ORGANIZED HEALTH CARE EDUCATION/TRAINING PROGRAM

## 2022-01-07 PROCEDURE — 97161 PT EVAL LOW COMPLEX 20 MIN: CPT

## 2022-01-07 PROCEDURE — 27000221 HC OXYGEN, UP TO 24 HOURS

## 2022-01-07 PROCEDURE — 80053 COMPREHEN METABOLIC PANEL: CPT | Performed by: STUDENT IN AN ORGANIZED HEALTH CARE EDUCATION/TRAINING PROGRAM

## 2022-01-07 PROCEDURE — 84450 TRANSFERASE (AST) (SGOT): CPT | Mod: 91 | Performed by: STUDENT IN AN ORGANIZED HEALTH CARE EDUCATION/TRAINING PROGRAM

## 2022-01-07 PROCEDURE — 99900035 HC TECH TIME PER 15 MIN (STAT)

## 2022-01-07 PROCEDURE — 36415 COLL VENOUS BLD VENIPUNCTURE: CPT | Performed by: TRANSPLANT SURGERY

## 2022-01-07 PROCEDURE — 99291 CRITICAL CARE FIRST HOUR: CPT | Mod: GC,,, | Performed by: STUDENT IN AN ORGANIZED HEALTH CARE EDUCATION/TRAINING PROGRAM

## 2022-01-07 PROCEDURE — 99291 PR CRITICAL CARE, E/M 30-74 MINUTES: ICD-10-PCS | Mod: GC,,, | Performed by: STUDENT IN AN ORGANIZED HEALTH CARE EDUCATION/TRAINING PROGRAM

## 2022-01-07 PROCEDURE — 85610 PROTHROMBIN TIME: CPT | Performed by: STUDENT IN AN ORGANIZED HEALTH CARE EDUCATION/TRAINING PROGRAM

## 2022-01-07 PROCEDURE — 37799 UNLISTED PX VASCULAR SURGERY: CPT

## 2022-01-07 PROCEDURE — 80048 BASIC METABOLIC PNL TOTAL CA: CPT | Performed by: STUDENT IN AN ORGANIZED HEALTH CARE EDUCATION/TRAINING PROGRAM

## 2022-01-07 PROCEDURE — 63600175 PHARM REV CODE 636 W HCPCS: Performed by: NURSE PRACTITIONER

## 2022-01-07 PROCEDURE — 85025 COMPLETE CBC W/AUTO DIFF WBC: CPT | Mod: 91 | Performed by: TRANSPLANT SURGERY

## 2022-01-07 PROCEDURE — 20600001 HC STEP DOWN PRIVATE ROOM

## 2022-01-07 PROCEDURE — 80197 ASSAY OF TACROLIMUS: CPT | Performed by: STUDENT IN AN ORGANIZED HEALTH CARE EDUCATION/TRAINING PROGRAM

## 2022-01-07 PROCEDURE — 80053 COMPREHEN METABOLIC PANEL: CPT | Mod: 91 | Performed by: TRANSPLANT SURGERY

## 2022-01-07 PROCEDURE — 84450 TRANSFERASE (AST) (SGOT): CPT | Mod: 91 | Performed by: TRANSPLANT SURGERY

## 2022-01-07 RX ORDER — FAMOTIDINE 20 MG/1
20 TABLET, FILM COATED ORAL NIGHTLY
Status: DISCONTINUED | OUTPATIENT
Start: 2022-01-07 | End: 2022-01-11

## 2022-01-07 RX ORDER — SERTRALINE HYDROCHLORIDE 25 MG/1
25 TABLET, FILM COATED ORAL DAILY
Status: DISCONTINUED | OUTPATIENT
Start: 2022-01-07 | End: 2022-01-13 | Stop reason: HOSPADM

## 2022-01-07 RX ORDER — PREDNISONE 5 MG/1
TABLET ORAL
Qty: 75 TABLET | Refills: 0 | Status: SHIPPED | OUTPATIENT
Start: 2022-01-07 | End: 2022-02-15 | Stop reason: ALTCHOICE

## 2022-01-07 RX ORDER — MYCOPHENOLATE MOFETIL 250 MG/1
1000 CAPSULE ORAL 2 TIMES DAILY
Status: DISCONTINUED | OUTPATIENT
Start: 2022-01-07 | End: 2022-01-13 | Stop reason: HOSPADM

## 2022-01-07 RX ORDER — IBUPROFEN 200 MG
16 TABLET ORAL
Status: DISCONTINUED | OUTPATIENT
Start: 2022-01-07 | End: 2022-01-08

## 2022-01-07 RX ORDER — VALGANCICLOVIR 450 MG/1
450 TABLET, FILM COATED ORAL DAILY
Qty: 30 TABLET | Refills: 5 | Status: SHIPPED | OUTPATIENT
Start: 2022-01-16 | End: 2022-05-24 | Stop reason: SINTOL

## 2022-01-07 RX ORDER — IBUPROFEN 200 MG
24 TABLET ORAL
Status: DISCONTINUED | OUTPATIENT
Start: 2022-01-07 | End: 2022-01-08

## 2022-01-07 RX ORDER — METOPROLOL SUCCINATE 50 MG/1
50 TABLET, EXTENDED RELEASE ORAL DAILY
Status: DISCONTINUED | OUTPATIENT
Start: 2022-01-07 | End: 2022-01-09

## 2022-01-07 RX ORDER — NYSTATIN 100000 [USP'U]/ML
500000 SUSPENSION ORAL
Qty: 250 ML | Refills: 0 | Status: ON HOLD | OUTPATIENT
Start: 2022-01-07 | End: 2022-02-06 | Stop reason: HOSPADM

## 2022-01-07 RX ORDER — SULFAMETHOXAZOLE AND TRIMETHOPRIM 400; 80 MG/1; MG/1
1 TABLET ORAL EVERY MORNING
Qty: 30 TABLET | Refills: 5 | Status: SHIPPED | OUTPATIENT
Start: 2022-01-13 | End: 2022-05-24 | Stop reason: SINTOL

## 2022-01-07 RX ORDER — MYCOPHENOLATE MOFETIL 250 MG/1
1000 CAPSULE ORAL 2 TIMES DAILY
Qty: 240 CAPSULE | Refills: 2 | Status: ON HOLD | OUTPATIENT
Start: 2022-01-07 | End: 2022-02-06 | Stop reason: SDUPTHER

## 2022-01-07 RX ORDER — INSULIN ASPART 100 [IU]/ML
0-10 INJECTION, SOLUTION INTRAVENOUS; SUBCUTANEOUS
Status: DISCONTINUED | OUTPATIENT
Start: 2022-01-07 | End: 2022-01-08

## 2022-01-07 RX ORDER — TACROLIMUS 1 MG/1
6 CAPSULE ORAL EVERY 12 HOURS
Qty: 360 CAPSULE | Refills: 11 | Status: SHIPPED | OUTPATIENT
Start: 2022-01-07 | End: 2022-01-13

## 2022-01-07 RX ORDER — METOPROLOL SUCCINATE 50 MG/1
50 TABLET, EXTENDED RELEASE ORAL DAILY
Status: DISCONTINUED | OUTPATIENT
Start: 2022-01-07 | End: 2022-01-07

## 2022-01-07 RX ORDER — LIDOCAINE HYDROCHLORIDE 10 MG/ML
INJECTION INFILTRATION; PERINEURAL
Status: DISPENSED
Start: 2022-01-07 | End: 2022-01-07

## 2022-01-07 RX ORDER — DOCUSATE SODIUM 100 MG/1
100 CAPSULE, LIQUID FILLED ORAL 3 TIMES DAILY
Status: DISCONTINUED | OUTPATIENT
Start: 2022-01-07 | End: 2022-01-13 | Stop reason: HOSPADM

## 2022-01-07 RX ORDER — GLUCAGON 1 MG
1 KIT INJECTION
Status: DISCONTINUED | OUTPATIENT
Start: 2022-01-07 | End: 2022-01-08

## 2022-01-07 RX ORDER — TACROLIMUS 1 MG/1
1 CAPSULE ORAL 2 TIMES DAILY
Status: DISCONTINUED | OUTPATIENT
Start: 2022-01-07 | End: 2022-01-08

## 2022-01-07 RX ADMIN — DOCUSATE SODIUM 100 MG: 100 CAPSULE ORAL at 03:01

## 2022-01-07 RX ADMIN — TACROLIMUS 1 MG: 1 CAPSULE ORAL at 05:01

## 2022-01-07 RX ADMIN — INSULIN ASPART 2 UNITS: 100 INJECTION, SOLUTION INTRAVENOUS; SUBCUTANEOUS at 09:01

## 2022-01-07 RX ADMIN — CALCIUM GLUCONATE 2 G: 98 INJECTION, SOLUTION INTRAVENOUS at 04:01

## 2022-01-07 RX ADMIN — FAMOTIDINE 20 MG: 20 TABLET ORAL at 09:01

## 2022-01-07 RX ADMIN — METOPROLOL SUCCINATE 50 MG: 50 TABLET, EXTENDED RELEASE ORAL at 09:01

## 2022-01-07 RX ADMIN — OXYCODONE HYDROCHLORIDE 10 MG: 10 TABLET ORAL at 05:01

## 2022-01-07 RX ADMIN — MAGNESIUM SULFATE 4 G: 2 INJECTION INTRAVENOUS at 01:01

## 2022-01-07 RX ADMIN — PHYTONADIONE 10 MG: 10 INJECTION, EMULSION INTRAMUSCULAR; INTRAVENOUS; SUBCUTANEOUS at 05:01

## 2022-01-07 RX ADMIN — HEPARIN SODIUM 5000 UNITS: 5000 INJECTION INTRAVENOUS; SUBCUTANEOUS at 09:01

## 2022-01-07 RX ADMIN — OXYCODONE 5 MG: 5 TABLET ORAL at 12:01

## 2022-01-07 RX ADMIN — MUPIROCIN 1 G: 20 OINTMENT TOPICAL at 10:01

## 2022-01-07 RX ADMIN — MYCOPHENOLATE MOFETIL 1000 MG: 200 POWDER, FOR SUSPENSION ORAL at 09:01

## 2022-01-07 RX ADMIN — HEPARIN SODIUM 5000 UNITS: 5000 INJECTION INTRAVENOUS; SUBCUTANEOUS at 03:01

## 2022-01-07 RX ADMIN — MORPHINE SULFATE 2 MG: 2 INJECTION, SOLUTION INTRAMUSCULAR; INTRAVENOUS at 08:01

## 2022-01-07 RX ADMIN — OXYCODONE HYDROCHLORIDE 10 MG: 10 TABLET ORAL at 09:01

## 2022-01-07 RX ADMIN — OXYCODONE HYDROCHLORIDE 10 MG: 10 TABLET ORAL at 10:01

## 2022-01-07 RX ADMIN — NYSTATIN 500000 UNITS: 500000 SUSPENSION ORAL at 09:01

## 2022-01-07 RX ADMIN — NYSTATIN 500000 UNITS: 500000 SUSPENSION ORAL at 07:01

## 2022-01-07 RX ADMIN — NYSTATIN 500000 UNITS: 500000 SUSPENSION ORAL at 03:01

## 2022-01-07 RX ADMIN — MORPHINE SULFATE 2 MG: 2 INJECTION, SOLUTION INTRAMUSCULAR; INTRAVENOUS at 10:01

## 2022-01-07 RX ADMIN — AMPICILLIN SODIUM AND SULBACTAM SODIUM 3 G: 2; 1 INJECTION, POWDER, FOR SOLUTION INTRAMUSCULAR; INTRAVENOUS at 03:01

## 2022-01-07 RX ADMIN — HEPARIN SODIUM 5000 UNITS: 5000 INJECTION INTRAVENOUS; SUBCUTANEOUS at 05:01

## 2022-01-07 RX ADMIN — AMIODARONE HYDROCHLORIDE 200 MG: 200 TABLET ORAL at 09:01

## 2022-01-07 RX ADMIN — TAMSULOSIN HYDROCHLORIDE 0.4 MG: 0.4 CAPSULE ORAL at 09:01

## 2022-01-07 RX ADMIN — TACROLIMUS 1 MG: 1 CAPSULE, GELATIN COATED ORAL at 09:01

## 2022-01-07 RX ADMIN — DOCUSATE SODIUM 100 MG: 100 CAPSULE ORAL at 09:01

## 2022-01-07 RX ADMIN — MORPHINE SULFATE 2 MG: 2 INJECTION, SOLUTION INTRAMUSCULAR; INTRAVENOUS at 03:01

## 2022-01-07 RX ADMIN — METHYLPREDNISOLONE SODIUM SUCCINATE 200 MG: 40 INJECTION, POWDER, FOR SOLUTION INTRAMUSCULAR; INTRAVENOUS at 09:01

## 2022-01-07 RX ADMIN — MYCOPHENOLATE MOFETIL 1000 MG: 250 CAPSULE ORAL at 09:01

## 2022-01-07 RX ADMIN — FAMOTIDINE 20 MG: 10 INJECTION INTRAVENOUS at 09:01

## 2022-01-07 RX ADMIN — INSULIN HUMAN 1.2 UNITS/HR: 1 INJECTION, SOLUTION INTRAVENOUS at 10:01

## 2022-01-07 NOTE — PLAN OF CARE
Recommendations    1. Continue Diabetic diet     2. If PO intake < 50%, add Boost Glucose Control TID     3. Post transplant diet education to be provided    Goals: Pt to meet % EEN/EPN over the course of 7 days  Nutrition Goal Status: new  Communication of NICKO Recs:  (POC)

## 2022-01-07 NOTE — CONSULTS
[facility]  Endocrinology  Diabetes Consult Note    Consult Requested by: Leland Munoz MD   Reason for admit: End stage liver disease    HISTORY OF PRESENT ILLNESS:  Reason for Consult: Management of T2DM, Hyperglycemia     Surgical Procedure and Date: Liver Transplant 01/06/2022    Diabetes diagnosis year: 2000    Home Diabetes Medications:  per Dr. CINDA Argueta   Lantus 22 units HS  Metformin 1000 mg BID    How often checking glucose at home?  YUSUF (intubated)    BG readings on regimen: YUSUF  Hypoglycemia on the regimen? YUSUF  Missed doses on regimen? YUSUF    Diabetes Complications include:     Hyperglycemia and Diabetic peripheral neuropathy     Complicating diabetes co morbidities:   CIRRHOSIS and Active Cancer (skin), PAD, CASTILLO, HLD,       HPI:   Patient is a 66 y.o. male with a diagnosis of Afib (on coumadin), CASTILLO cirrhosis, and HCC s/p TACE and RFA who is listed for liver transplant with MELD 24. He now presents to the hospital for liver transplant on 01/06/2022. Endocrinology consulted to manage glycemic control s/p transplant surgery.     Lab Results   Component Value Date    HGBA1C 5.8 (H) 01/05/2022                 Interval HPI:   Overnight events:  Patient Intubated in SICU. BG above goal with elevated insulin infusion rates. Endo will continue to follow, and manage glycemic control while inpatient.   Diet NPO Except for: Sips with Medication  Day of Surgery    Eating:   NPO  Nausea: No  Hypoglycemia and intervention: No  Fever: No  TPN and/or TF: No  If yes, type of TF/TPN and rate: None    PMH, PSH, FH, SH updated and reviewed     ROS:  Unable to obtain due to: Sedation,Intubation,Altered mental status,Critical illness,Reviewed ROS from note dated 01/05/2021 per primary team.       Current Medications and/or Treatments Impacting Glycemic Control  Immunotherapy:    Immunosuppressants         Stop Route Frequency     tacrolimus (PROGRAF) 1 mg/mL oral syringe         -- Oral 2  "times daily     mycophenolate mofetil 200 mg/mL suspension 1,000 mg         -- Oral 2 times daily        Steroids:   Hormones (From admission, onward)            Start     Stop Route Frequency Ordered    01/12/22 0900  predniSONE tablet 20 mg  (methylprednisolone taper panel)        "Followed by" Linked Group Details    -- Oral Daily 01/06/22 1314    01/11/22 0900  methylPREDNISolone sodium succinate injection 40 mg  (methylprednisolone taper panel)        "Followed by" Linked Group Details    01/12 0859 IV Daily 01/06/22 1314    01/10/22 0900  methylPREDNISolone sodium succinate injection 80 mg  (methylprednisolone taper panel)        "Followed by" Linked Group Details    01/11 0859 IV Daily 01/06/22 1314    01/09/22 0900  methylPREDNISolone sodium succinate injection 120 mg  (methylprednisolone taper panel)        "Followed by" Linked Group Details    01/10 0859 IV Daily 01/06/22 1314    01/08/22 0900  methylPREDNISolone sodium succinate injection 160 mg  (methylprednisolone taper panel)        "Followed by" Linked Group Details    01/09 0859 IV Daily 01/06/22 1314    01/07/22 0900  methylPREDNISolone sodium succinate injection 200 mg  (methylprednisolone taper panel)        "Followed by" Linked Group Details    01/08 0859 IV Daily 01/06/22 1314    01/06/22 1445  vasopressin (PITRESSIN) 0.2 Units/mL in dextrose 5 % 100 mL infusion         -- IV Continuous 01/06/22 1332        Pressors:    Autonomic Drugs (From admission, onward)            Start     Stop Route Frequency Ordered    01/06/22 1445  NORepinephrine 4 mg in dextrose 5% 250 mL infusion (premix) (titrating)        Question Answer Comment   Begin at (in mcg/kg/min): 0.06    Titrate by: (in mcg/kg/min) 0.01    Titrate interval: (in minutes) 10    Titrate to maintain: (MAP or SBP) MAP    Greater than: (in mmHg) 65    Maximum dose: (in mcg/kg/min) 0.15        -- IV Continuous 01/06/22 1332        Hyperglycemia/Diabetes Medications:   Antihyperglycemics (From " admission, onward)            Start     Stop Route Frequency Ordered    01/06/22 1500  insulin regular in 0.9 % NaCl 100 unit/100 mL (1 unit/mL) infusion        Question:  Enter initial dose from Infusion Protocol Chart (Units/hr):  Answer:  6    -- IV Continuous 01/06/22 1350             PHYSICAL EXAMINATION:  Vitals:    01/06/22 1406   BP:    Pulse: 60   Resp: (!) 6   Temp:      Body mass index is 28.24 kg/m².    Physical Exam   Constitutional:  Well developed, well nourished, NAD.  ENT: External ears no masses with nose patent  Neck:  Supple; trachea midline  Cardiovascular: Normal heart sounds, no LE edema.     Lungs:  Normal effort; lungs anterior bilaterally clear to auscultation.  Intubated on a ventilator.  Abdomen:  Soft, no masses, no hernias. Hypoactive BS noted.  MS: No clubbing or cyanosis of nails noted; unable to assess gait.  Skin: No rashes, lesions, or ulcers; no nodules.  Injection sites are ok. No lipo hypertropthy or atrophy.  Chevron abdominal incision with dressing; DANNIELLE x 2 to RLQ.  Psychiatric: YUSUF  Neurological: YUSUF  Foot: Nails in good condition, no amputations noted.      Labs Reviewed and Include   Recent Labs   Lab 01/06/22  1319 01/06/22  1319 01/06/22  1600   *  --   --    CALCIUM 8.0*  --   --    ALBUMIN 2.9*  --   --    PROT 4.9*  --   --      --   --    K 3.3*  --   --    CO2 21*  --   --      --   --    BUN 16  --   --    CREATININE 1.0  --   --    ALKPHOS 55  --   --    *  --   --    *   < > 1,057*   BILITOT 1.2*  --   --     < > = values in this interval not displayed.     Lab Results   Component Value Date    WBC 11.53 01/06/2022    HGB 12.5 (L) 01/06/2022    HCT 33 (L) 01/06/2022    MCV 90 01/06/2022     (L) 01/06/2022     No results for input(s): TSH, FREET4 in the last 168 hours.  Lab Results   Component Value Date    HGBA1C 5.8 (H) 01/05/2022       Nutritional status:   Body mass index is 28.24 kg/m².  Lab Results   Component Value Date     ALBUMIN 2.9 (L) 01/06/2022    ALBUMIN 2.6 (L) 01/06/2022    ALBUMIN 3.3 (L) 01/06/2022     No results found for: PREALBUMIN    Estimated Creatinine Clearance: 91.7 mL/min (based on SCr of 1 mg/dL).    Accu-Checks  Recent Labs     01/06/22  1316 01/06/22  1407 01/06/22  1506 01/06/22  1606 01/06/22  1701 01/06/22  1803   POCTGLUCOSE 222* 216* 171* 164* 145* 125*        ASSESSMENT and PLAN    * End stage liver disease  Managed per primary team  Avoid hypoglycemia        Type 2 diabetes mellitus  BG goal 140 - 180     - Continue IV insulin infusion protocol  - Requires intensive BG monitoring while on protocol   - BG check q1hr    ** Please call Endocrine for any BG related issues **  ** Please notify Endocrine for any change and/or advance in diet**  Lab Results   Component Value Date    HGBA1C 5.8 (H) 01/05/2022       Discharge planning: TBD          S/P liver transplant  Managed per primary team  Avoid hypoglycemia        Adverse effect of adrenal cortical steroids, sequela  On steroid therapy per transplant team; may elevate BG readings        Surgical wound present  Optimize BG control to improve wound healing            Plan discussed with patient, family, and RN at bedside.        João Bender, DNP, FNP  Endocrinology  [facility]

## 2022-01-07 NOTE — PROGRESS NOTES
Patient arrived from ICU via wheelchair.  Patient AAOx4, VSS, and in NAD.  Patient with skin intact.  Patient ambulated to restroom with one person standby assist.  Patient and significant other, Aniya, orientated to room and visitor policy.  Insulin drip infusing at 1.2 units/hr.  Will continue to monitor patient.

## 2022-01-07 NOTE — SUBJECTIVE & OBJECTIVE
Interval History/Significant Events:     POD1 s/p liver transplantation  NAEON. AF, HDS now off pressors  Extubated in ICU last night  Pain well controlled  Tolerating clear liquids  Not yet OOB  Drain #1 148ml out, drain #2 252ml out  UOP 5400ml     Follow-up For: Procedure(s) (LRB):  TRANSPLANT, LIVER (N/A)    Post-Operative Day: 1 Day Post-Op    Objective:     Vital Signs (Most Recent):  Temp: 99.1 °F (37.3 °C) (01/07/22 0300)  Pulse: 78 (01/07/22 0600)  Resp: 10 (01/07/22 0600)  BP: 128/60 (01/07/22 0400)  SpO2: 96 % (01/07/22 0600) Vital Signs (24h Range):  Temp:  [99.1 °F (37.3 °C)-99.8 °F (37.7 °C)] 99.1 °F (37.3 °C)  Pulse:  [54-83] 78  Resp:  [6-26] 10  SpO2:  [92 %-100 %] 96 %  BP: ()/(44-67) 128/60  Arterial Line BP: ()/(44-64) 147/58     Weight: 102.6 kg (226 lb 3.1 oz)  Body mass index is 28.27 kg/m².      Intake/Output Summary (Last 24 hours) at 1/7/2022 0648  Last data filed at 1/7/2022 0600  Gross per 24 hour   Intake 8587.71 ml   Output 7800 ml   Net 787.71 ml       Physical Exam  Constitutional:       General: He is not in acute distress.  Eyes:      Extraocular Movements: Extraocular movements intact.      Conjunctiva/sclera: Conjunctivae normal.   Cardiovascular:      Rate and Rhythm: Normal rate and regular rhythm.      Pulses: Normal pulses.   Pulmonary:      Effort: Pulmonary effort is normal.   Abdominal:      General: There is no distension.      Palpations: Abdomen is soft.      Comments: Incision with dressings in place, c/d/i  Drains with serous output  Soft, depressible, appropriately tender   Musculoskeletal:         General: No swelling.      Cervical back: Normal range of motion and neck supple.   Skin:     General: Skin is warm and dry.      Coloration: Skin is not jaundiced.   Neurological:      General: No focal deficit present.      Mental Status: He is alert and oriented to person, place, and time.   Psychiatric:         Mood and Affect: Mood normal.          Vents:  Vent Mode: Spont (01/06/22 1737)  Ventilator Initiated: Yes (01/06/22 1311)  Set Rate: 18 BPM (01/06/22 1701)  Vt Set: 550 mL (01/06/22 1701)  Pressure Support: 10 cmH20 (01/06/22 1737)  PEEP/CPAP: 5 cmH20 (01/06/22 1737)  Oxygen Concentration (%): 50 (01/06/22 1737)  Peak Airway Pressure: 16 cmH2O (01/06/22 1737)  Plateau Pressure: 0 cmH20 (01/06/22 1737)  Total Ve: 4.95 mL (01/06/22 1737)  Negative Inspiratory Force (cm H2O): -25 (01/06/22 1737)  F/VT Ratio<105 (RSBI): (!) 31.47 (01/06/22 1701)    Lines/Drains/Airways     Central Venous Catheter Line            Introducer 01/06/22 right internal jugular 1 day    Trialysis (Dialysis) Catheter 01/06/22 right internal jugular 1 day    Pulmonary Artery Catheter Assessment  01/06/22 0813 <1 day          Drain                 Closed/Suction Drain 01/06/22 1222 Right Abdomen Bulb 19 Fr. <1 day         Closed/Suction Drain 01/06/22 1222 Right Abdomen Bulb 19 Fr. <1 day         Urethral Catheter 01/06/22 0730 Double-lumen 16 Fr. <1 day          Arterial Line            Arterial Line 01/06/22 0719 Left Radial <1 day    Arterial Line 01/06/22 0756 Left Femoral <1 day          Peripheral Intravenous Line                 Peripheral IV - Single Lumen 01/05/22 2300 20 G Posterior;Right Forearm 1 day         LEONCIO 01/06/22 0737 Left Antecubital <1 day                Significant Labs:    CBC/Anemia Profile:  Recent Labs   Lab 01/06/22 2000 01/07/22  0002 01/07/22  0353   WBC 7.62 9.03 10.08   HGB 11.4* 11.3* 11.3*   HCT 32.9* 33.8* 34.4*   PLT 71* 73* 73*   MCV 89 89 89   RDW 14.6* 14.4 14.3        Chemistries:  Recent Labs   Lab 01/06/22  1122 01/06/22  1122 01/06/22  1319 01/06/22  1600 01/06/22 2000 01/07/22  0002 01/07/22  0353      < > 142   < > 143 142 140   K 3.2*   < > 3.3*   < > 4.6 4.7 4.4   CL  --   --  102   < > 104 105 103   CO2  --   --  21*   < > 25 24 26   BUN  --   --  16   < > 17 19 19   CREATININE  --   --  1.0   < > 1.2 1.2 1.1   CALCIUM   --   --  8.0*   < > 8.5* 8.4* 8.1*   ALBUMIN 2.6*   < > 2.9*  --   --  3.9 3.6   PROT  --   --  4.9*  --   --  5.7* 5.4*   BILITOT  --   --  1.2*  --   --  2.6* 2.6*   ALKPHOS  --   --  55  --   --  55 56   *   < > 452*  --   --  533* 507*   *   < > 732*   < > 902* 816*  792* 655*  671*   MG 1.4*  --  1.4*  --   --  1.2*  --    PHOS  --   --  1.9*  --   --   --   --     < > = values in this interval not displayed.         Significant Imaging:  I have reviewed all pertinent imaging results/findings within the past 24 hours.

## 2022-01-07 NOTE — PLAN OF CARE
Discharge Recommendation: Home, no PT needs.    Evaluation completed today. PT goals appropriate.    Patient is safe to perform ambulation with CGA and HHA with nursing staff.    Please continue Progressive Mobility Protocol as appropriate.    Solange Caruso, PT, DPT  2022  Pager: [phone]      Problem: Physical Therapy Goal  Goal: Physical Therapy Goal  Description: Goals to be met by: 2022     Patient will increase functional independence with mobility by performin. Sit to stand transfer with Modified [place]  2. Bed to chair transfer with Supervision using LRAD  3. Gait  x 250 feet with Supervision using LRAD and standing rest breaks prn.   4. Ascend/descend 19 stair with left Handrails Stand-by Assistance using LRAD.   5. Lower extremity exercise program x30 reps per handout, with independence    Outcome: Ongoing, Progressing

## 2022-01-07 NOTE — CONSULTS
"[facility]  Adult Nutrition  Consult Note    SUMMARY     Recommendations    1. Continue Diabetic diet     2. If PO intake < 50%, add Boost Glucose Control TID     3. Post transplant diet education to be provided    Goals: Pt to meet % EEN/EPN over the course of 7 days  Nutrition Goal Status: new  Communication of RD Recs:  (POC)    Assessment and Plan    Nutrition Problem  Increased nutrient needs, protein    Related to (etiology):   Physiological demands, healing    Signs and Symptoms (as evidenced by):   S/p OLTx on 1/6    Interventions (treatment strategy):  Collaboration with other providers  CHO modified diet  Nutrition education    Nutrition Diagnosis Status:   New       Reason for Assessment    Reason For Assessment: consult  Diagnosis: liver disease  Relevant Medical History: Afib , CASTILLO cirrhosis, HCC s/p TACE and RFA, DM2, HTN, HLD    General Information Comments: S/p OLTx on 1/6. Per chart review, pt noted with intentional wt loss due to portion control and better food choices. Good appetite PTA with good PO intake per outpt RD note. Unable to assess recent PTA PO intake and nutrition hx. #. NFPE to be completed upon f/u. No s/s of malnutrition with current information.    Nutrition Discharge Planning: Post transplant diet education to be provided on TSU    Nutrition Risk Screen    Nutrition Risk Screen: no indicators present    Nutrition/Diet History    Spiritual, Cultural Beliefs, Baptism Practices, Values that Affect Care: no  Food Allergies: NKFA  Factors Affecting Nutritional Intake: None identified at this time    Anthropometrics    Temp: 99.1 °F (37.3 °C)  Height Method: Stated  Height: 6' 3" (190.5 cm)  Height (inches): 75 in  Weight Method: Bed Scale  Weight: 102.6 kg (226 lb 3.1 oz)  Weight (lb): 226.19 lb  Ideal Body Weight (IBW), Male: 196 lb  % Ideal Body Weight, Male (lb): 115.29 %  BMI (Calculated): 28.3  BMI Grade: 25 - 29.9 - overweight  Weight " Loss: intentional       Lab/Procedures/Meds    Pertinent Labs Reviewed: reviewed  Pertinent Labs Comments: glu 157, bili 2.6, ,   Pertinent Medications Reviewed: reviewed  Pertinent Medications Comments: famotidine, heparin, methylprednisolone, prednisone, metoprolol, mycophenolate, statin, tacrolimus, valganciclovir, cleviprex, insulin    Estimated/Assessed Needs    Weight Used For Calorie Calculations: 102.6 kg (226 lb 3.1 oz)  Energy Calorie Requirements (kcal): 2258 kcal/day  Energy Need Method: [place]-St Jeor (PAL 1.2)  Protein Requirements: 103-123 g/day (1.0-1.2 g/kg)  Weight Used For Protein Calculations: 102.6 kg (226 lb 3.1 oz)  Fluid Requirements (mL): 1 mL/kcal or per MD  Estimated Fluid Requirement Method: RDA Method  RDA Method (mL): 2258  CHO Requirement: 282 gm      Nutrition Prescription Ordered    Current Diet Order: Diabetic 2000 kcal    Evaluation of Received Nutrient/Fluid Intake    I/O: +878mL since admit  Energy Calories Required: not meeting needs  Protein Required: not meeting needs  Fluid Required: other (see comments) (Per MD)  Comments: LBM: 1/5  Tolerance: tolerating  % Intake of Estimated Energy Needs: 25 - 50 %  % Meal Intake: 25 - 50 %    Nutrition Risk    Level of Risk/Frequency of Follow-up: high       Monitor and Evaluation    Food and Nutrient Intake: energy intake  Food and Nutrient Adminstration: diet order  Knowledge/Beliefs/Attitudes: food and nutrition knowledge/skill  Anthropometric Measurements: weight,weight change  Biochemical Data, Medical Tests and Procedures: electrolyte and renal panel,gastrointestinal profile,glucose/endocrine profile,inflammatory profile,lipid profile  Nutrition-Focused Physical Findings: overall appearance       Nutrition Follow-Up    RD Follow-up?: Yes

## 2022-01-07 NOTE — NURSING TRANSFER
Nursing Transfer Note      1/7/2022     Reason patient is being transferred: Lower Level of care    Transfer To: [id number]    Transfer via wheelchair    Transfer with cardiac monitoring    Transported by RN x2    Medicines sent: none    Any special needs or follow-up needed: CBC and AST due at 1600    Chart send with patient: Yes    Notified: spouse    Patient reassessed at: 1300 1/7/22     Upon arrival to floor: cardiac monitor applied, patient oriented to room, call bell in reach and bed in lowest position

## 2022-01-07 NOTE — H&P
Transplant Surgery  History & Physical    SUBJECTIVE:       History of Present Illness:  Mr Tej Purdy is a 67 y/o M with Afib (on coumadin), CASTILLO cirrhosis, and HCC s/p TACE and RFA who is listed for liver transplant with MELD 24. He now presents to the hospital for liver transplant. He reports being in his usual state of health. Denies fever/chills. Patient transported to the ICU after liver transplant, intubated, sedated, paralyzed, and requiring pressor support. Details regarding the operation are outlined below:     Intra-op fluids and products:    Crystalloid (mL) 5,000   RBC (Units) 2   FFP (Units) 3   Cell Saver (CCs) 600   Albumin (Units) 500     Surgical data:  Graft type (whole vs. partial): whole liver  IVC reconstruction: end to end ivc  Portal vein status: patent  Portal graft performed? no  Donor arterial anatomy: replaced left hepatic from left gastric  Donor arterial inflow: sa branch patch  Recipient arterial anatomy: standard  Recipient arterial inflow: right-left hepatic branch patch  Arterial graft performed? no  Biliary reconstruction: end to end (donor common hepatic duct to recipient common bile duct)  Biliary stent: none  Disposition of Vessels from donor of transplanted organ: sent to blood bank  Damage during procurement: no  Procurement damage comments:       Donor Factors:  UNOS ID: [id number]  UNOS Match Run: [id number]  Donor type: donation after brain death  Donor with reported PHS risk criteria: no  Donor CMV serologic status: Positive  Donor with known cancer: no  Donor HCV serologic status: Negative  Donor HBcAb: Negative  Donor HBV JAYLIN: Negative  Donor HCV JAYLIN: Negative  Liver weight: 1307 grams      Allergies:  Review of patient's allergies indicates:   Allergen Reactions    Bee pollens Swelling     BEE STINGS swells body       Home Medications:  Current Facility-Administered Medications on File Prior to Encounter   Medication    sodium chloride 0.9% bolus 1,000 mL     Current  "Outpatient Medications on File Prior to Encounter   Medication Sig    amiodarone (PACERONE) 200 MG Tab Take 1 tablet (200 mg total) by mouth once daily.    aspirin (ECOTRIN) 81 MG EC tablet Take 81 mg by mouth once daily.    blood sugar diagnostic Strp To check BG 2 times daily, to use with insurance preferred meter    blood-glucose meter kit To check BG 2 times daily, to use with insurance preferred meter    gabapentin (NEURONTIN) 300 MG capsule Take 1 capsule (300 mg total) by mouth 3 (three) times daily as needed (neuropathy).    insulin (LANTUS SOLOSTAR U-100 INSULIN) glargine 100 units/mL (3mL) SubQ pen Inject 22 Units into the skin every evening. Adjust dose as directed until AM glucose at goal . Max daily dose 30 units/day    lactulose (CHRONULAC) 10 gram/15 mL solution Take 30 g by mouth 3 (three) times daily.     lancets Misc To check BG 2 times daily, to use with insurance preferred meter    lisinopriL (PRINIVIL,ZESTRIL) 20 MG tablet Take 20 mg by mouth once daily.    lovastatin (MEVACOR) 20 MG tablet Take 20 mg by mouth every evening.     metFORMIN (GLUCOPHAGE) 1000 MG tablet Take 1,000 mg by mouth 2 (two) times daily with meals.     metoprolol succinate (TOPROL-XL) 25 MG 24 hr tablet Take 2 tablets (50 mg total) by mouth once daily.    multivitamin capsule Take 1 capsule by mouth once daily.    omega-3 fatty acids/fish oil (FISH OIL-OMEGA-3 FATTY ACIDS) 300-1,000 mg capsule Take 1 capsule by mouth once daily.     oxyCODONE-acetaminophen (PERCOCET) 5-325 mg per tablet Take 1 tablet by mouth every 4 (four) hours as needed for Pain. (Patient not taking: Reported on 11/11/2021)    pantoprazole (PROTONIX) 20 MG tablet Take 1 tablet (20 mg total) by mouth once daily.    pen needle, diabetic (BD ULTRA-FINE GÉNESIS PEN NEEDLE) 32 gauge x 5/32" Ndle Use with insulin once daily    rifAXIMin (XIFAXAN) 550 mg Tab Take 1 tablet (550 mg total) by mouth 2 (two) times daily.    sertraline (ZOLOFT) " 25 MG tablet Take 25 mg by mouth once daily.     tamsulosin (FLOMAX) 0.4 mg Cap Take 1 capsule (0.4 mg total) by mouth every evening.    warfarin (COUMADIN) 5 MG tablet 7.5mg PO daily except 5mg PO Tues/Sat -- OR AS DIRECTED BY COUMADIN CLINIC    zolpidem (AMBIEN) 5 MG Tab Take 1 tablet (5 mg total) by mouth nightly as needed (insomnia).       Past Medical History:   Diagnosis Date    Cirrhosis 11/23/2020    Diabetes mellitus, type 2     Diabetic neuropathy 11/24/2020    Disorder of kidney and ureter     HTN (hypertension) 11/23/2020    Hyperlipidemia 11/23/2020    Kidney stones 11/23/2020    S/p lithotripsy 2020    Leukocytosis 1/15/2021    Liver mass 11/23/2020    CASTILLO (nonalcoholic steatohepatitis) 11/23/2020    PAD (peripheral artery disease)     Portal hypertension 11/23/2020    Skin cancer 11/23/2020     Past Surgical History:   Procedure Laterality Date    COLONOSCOPY  10/2020    ESOPHAGOGASTRODUODENOSCOPY  10/2020    ESOPHAGOGASTRODUODENOSCOPY N/A 7/1/2021    Procedure: EGD (ESOPHAGOGASTRODUODENOSCOPY);  Surgeon: Jovan David MD;  Location: [facility] (4TH FLR);  Service: Endoscopy;  Laterality: N/A;  HCC. Listed for liver transplant. EGD for variceal surveillance.  cardiac clearance and blood thinenr approval received, see telephone encounter 6/23/21-BB  fully vaccinated-BB  labs same day of procedure-BB    EXCISION OF HYDROCELE Right     hemorroid surgery      hemorroidectomy      KIDNEY STONE SURGERY      LEFT HEART CATHETERIZATION N/A 1/27/2021    Procedure: Left heart cath;  Surgeon: Kris Shelton MD;  Location: [facility] CATH LAB;  Service: Cardiology;  Laterality: N/A;    liver mass removal      LIVER TRANSPLANT N/A 1/6/2022    Procedure: TRANSPLANT, LIVER;  Surgeon: Lizet Garcia MD;  [facility];  Service: Transplant;  Laterality: N/A;    RIGHT HEART CATHETERIZATION Right 5/7/2021    Procedure: INSERTION, CATHETER, RIGHT HEART;  Surgeon: Jaden Schuster  MD;  Location: [facility] CATH LAB;  Service: Cardiology;  Laterality: Right;    TREATMENT OF CARDIAC ARRHYTHMIA N/A 2021    Procedure: CARDIOVERSION;  Surgeon: Didier Tilley MD;  Location: [facility] EP LAB;  Service: Cardiology;  Laterality: N/A;  a fib, dccv/johny, mac, dm. sscu    TREATMENT OF CARDIAC ARRHYTHMIA N/A 2021    Procedure: CARDIOVERSION;  Surgeon: Daniel Arambula MD;  Location: [facility] EP LAB;  Service: Cardiology;  Laterality: N/A;  afib, DCCV, anest., DM, 3 prep    TREATMENT OF CARDIAC ARRHYTHMIA N/A 2021    Procedure: Cardioversion or Defibrillation;  Surgeon: Didier Tilley MD;  Location: [facility] EP LAB;  Service: Cardiology;  Laterality: N/A;  AF, JOHNY (cancel if complaint), DCCV, MAC, DM, 3 Prep    TREATMENT OF CARDIAC ARRHYTHMIA N/A 2021    Procedure: Cardioversion or Defibrillation;  Surgeon: Didier Tilley MD;  Location: [facility] EP LAB;  Service: Cardiology;  Laterality: N/A;  AF, JOHNY (cx if complaint), DCCV, MAC, DM, 3 Prep     History reviewed. No pertinent family history.  Social History     Tobacco Use    Smoking status: Former Smoker     Quit date: 1980     Years since quittin.9    Smokeless tobacco: Never Used   Substance Use Topics    Alcohol use: Not Currently    Drug use: Never        Review of Systems:  Unable to obtain while pt remains intubated/sedated    OBJECTIVE:     Vital Signs:  Temp: 99.1 °F (37.3 °C) (22 0300)  Pulse: 76 (22 07)  Resp: (!) 8 (22)  BP: 117/63 (22)  SpO2: 96 % (22)    Physical Exam:  General: intubated, sedated, and paralyzed  HEENT: normocephalic, atraumatic,no scleral icterus  Neck: HD cath, pulm artery cath, ETT, OGT in place  Lungs: equal chest movement b/l, mechanically ventilated on minimal settings  Cardiovascular: regular rate and rhythm.  On multiple pressor support  Extremities: no cyanosis or edema, or clubbing, distal pulses palpable and symmetric  Abdomen: soft, non-tender to  palpation, no distention, chevron incision with dressings in place, R sided DANNIELLE drains with serosanguinous output  Skin: Skin color, texture, turgor normal. No rashes or lesions  Musculoskeletal:no clubbing, cyanosis, no deformities  Neurologic: intubated, sedated, paralyzed    Labs and Imaging:     Post op labs and imaging pending at this time      ASSESSMENT:     66 year old male with PMH significant for HCC, CASTILLO, a-fib (on warfarin), HTN, CAD. Presents to ICU following liver transplant, intubated, sedated, paralyzed, and requiring multiple pressor support. No immediate post operative complications.    PLAN:       Neurological  - wean sedation as tolerated  - PRN pain control once weaned off     Respiratory  - On minimal vent settings  - Wean to extubate tonight if able  - post op CXR pending     Cardiovascular  - wean pressor support  - restart home rate and rhythm control once off pressors     Endocrine  - continue insulin drip per endocrinology     FEN/GI  - NPO with OGT  - PRN electrolyte replacements  - Will resuscitate with albumin boluses as needed  - Liver ultrasound in AM  - Continue q4h AST, CBC, PT/INR - may decrease frequency if remain stable tomorrow  - Daily tacro and CMP  - Pepcid for GI ppx/ baseline GERD  - Drains serosang, continue at this time     Renal  - Trend hourly UOP  - Continue irwin  - Continue strict ins and outs     Heme/ID  - Afebrile, WBC in expected range post op  - Continue valgancyclovir, bactrim ppx  - On home warfarin for a-fib, reversed pre-op. Continue to hold therapeutic anticoagulation at this time  - ppx SQH and SCDs     LDAs  - L femoral a-line  - L radial a-line  - RIJ introducer and PA catheter  - RIJ HD catheter  - Irwin  - DANNIELLE drains x2  - ETT  - OGT     Ppx  - SQH, SCDs  - Pepcid  - Bactrim

## 2022-01-07 NOTE — PLAN OF CARE
Pt remains AAO x4. Follows commands. OLEARY. NC 1 L, O2 sat >95%. Temp max 99.6*F. CVP 3-5 flat. MD aware. SBP<160 mmHg maintained. SVO2 65%. S1S2. Clear, diminished breath sounds bilat. Abd tender c palpation. Hypoactive bowel sounds noted. Chevron incision dressing clean, dry, and intact. Minimal serous/serosanguinous drainage to bilat DANNIELLE drains. Liver US performed. UO >50 ml/hr. Bilat radial and dp pulses 2+, bilat pt 1+. Remains on insulin. Cleviprex weaned off. Pain managed well c prn oxycodone and morphine. Repositioned q 2 hours. No skin breakdown noted. Bilat heals floated in boots, waffle mattress in use. Pt and family updated on POC. See chart and doc flowsheet for details. Will continue to monitor.

## 2022-01-07 NOTE — PT/OT/SLP EVAL
Physical Therapy Evaluation and Treatment    Patient Name:  Tej Purdy   MRN:  [id number]  Admit Date: 1/5/2022  Admitting Diagnosis:  End stage liver disease   Length of Stay: 2 days  Recent Surgery: Procedure(s) (LRB):  TRANSPLANT, LIVER (N/A) 1 Day Post-Op    Recommendations:     Discharge Recommendations:  home   Discharge Equipment Recommendations: none   Barriers to discharge: post-op, pain, decreased activity tolerance, impaired endurance, increased risk for falls, inaccessible home    Assessment:     Tej Purdy is a 66 y.o. male admitted with a medical diagnosis of End stage liver disease.  He presents with the following impairments/functional limitations: impaired endurance,impaired functional mobilty,gait instability,impaired balance,decreased lower extremity function,pain,impaired skin,decreased upper extremity function,impaired self care skills. Pt tolerated session well today. Pt presents s/p liver transplant 1/6/2022. Pt with good functional strength and required no assist for mobility on this date. At this time pt is primarily limited by post op pain as well as decreased activity tolerance. Pt not at baseline level of function and would benefit from skilled PT services. Pt will continue to benefit from skilled PT services during this hospital admit to continue to improve transfer ability and efficiency as well as continue to progress pt's ambulation distance and cardiopulmonary endurance to maximize pt's functional independence and return to PLOF.      Rehab Prognosis: Good; patient would benefit from acute skilled PT services to address these deficits and reach maximum level of function.      Plan:     During this hospitalization, patient to be seen 4 x/week to address the identified rehab impairments via gait training,therapeutic activities,therapeutic exercises,neuromuscular re-education and progress towards the established goals.    · Plan of Care Expires:  02/06/22    Subjective     RN,  "Samara, notified prior to session. No family/friends present upon PT entrance into room.    Chief Complaint: "my incision hurts when I move"  Patient/Family Comments/goals: get stronger, go home  Pain/Comfort:  · Pain Rating 1: other (see comments) (reporting increased pain with transitional movements but did not rate)  · Location - Side 1: Bilateral  · Location - Orientation 1: generalized  · Location 1: abdomen  · Pain Addressed 1: Distraction,Cessation of Activity  · Pain Rating Post-Intervention 1: 0/10 (once laying down or standing up)    Social History:  Residence: lives with significant other, Aniya, in [place] in a 1-story house with 19 CAPRICE and Lt HR in front entrance and BHR in back entrance. Pt's bathroom has a tub/shower.  Support available: family  Equipment Used: none  Equipment Owned (not using): shower chair  Prior level of function: independent  Hand Dominance: right.   Work: retired .   Drive: yes.   Managing Medicines/Managing Home: yes.   Hobbies: spending time with family    Patient reports they will have assistance from significant other upon discharge.    Objective:     Additional staff present: none    Patient found HOB elevated with: peripheral IV (VISI monitor)     General Precautions: Standard, Cardiac fall   Orthopedic Precautions:N/A   Braces: N/A   Body mass index is 28.27 kg/m².  Oxygen Device: Room Air  Vitals: /74 (BP Location: Right arm, Patient Position: Lying)   Pulse 83   Temp 98 °F (36.7 °C) (Oral)   Resp 19   Ht 6' 3" (1.905 m)   Wt 102.6 kg (226 lb 3.1 oz)   SpO2 (!) 93%   BMI 28.27 kg/m²     Exams:  · Cognition:   · Alert and Cooperative  · AxOx4  · Command following: Follows multistep verbal commands  · Fluency: clear/fluent  · Hearing: Intact  · Vision:  Intact  · Skin Integrity: Visible skin intact and Intact incision present along abdomen s/p liver transplant  · Postural Assessment: no deviations noted  · Physical Exam:    " Left UE Left LE Right UE Right LE   Edema absent absent absent absent   ROM AROM WFL AROM WFL AROM WFL AROM WFL   Modified Victoria Scale 0: No increase in muscle tone 0: No increase in muscle tone 0: No increase in muscle tone 0: No increase in muscle tone   Strength within normal limits within normal limits within normal limits within normal limits   Sensation intact to light touch intact to light touch intact to light touch intact to light touch   Coordination normal normal normal normal     Outcome Measures:  AM-PAC 6 CLICK MOBILITY  Turning over in bed (including adjusting bedclothes, sheets and blankets)?: 3  Sitting down on and standing up from a chair with arms (e.g., wheelchair, bedside commode, etc.): 3  Moving from lying on back to sitting on the side of the bed?: 3  Moving to and from a bed to a chair (including a wheelchair)?: 3  Need to walk in hospital room?: 3  Climbing 3-5 steps with a railing?: 3  Basic Mobility Total Score: 18     Functional Mobility:    Bed Mobility:   · Supine to Sit: contact guard assistance and with side rail; from Rt side of bed  · Cueing for log roll technique to decrease pressure on abdomen  · Scooting anteriorly to EOB to have both feet planted on floor: stand by assistance  · Sit to Supine: stand by assistance; to Rt side of bed    Sitting Balance at Edge of Bed:   Static Sitting Balance: Good- : able to accept minimal resistance   Dynamic Sitting Balance: Good- : able to sit unsupported and weight shift across midline minimally   Assistance Level Required: Stand-by Assistance    Transfers:   · Sit <> Stand Transfer: stand by assistance with no assistive device   · Stand <> Sit Transfer: stand by assistance with no assistive device   · [id number] from EOB    Standing Balance:   Static Standing Balance: Fair : able to stand unsupported without UE support and without LOB for 1 minute   Dynamic Standing Balance: Fair : stand independently unsupported, weight shift, and  reach ipsilaterally. LOB noted when crossing midline.   Assistance Level Required: Contact Guard Assistance   Patient used: no assistive device     Gait:   · Patient ambulated: 20 ft   · Patient required: contact guard  · Patient used:  IV pole for balance   · Gait Pattern observed: reciprocal gait  · Gait Deviation(s): decreased step length, wide base of support, decreased weight shift, shuffle gait, decreased mik and decreased arm swing  · Impairments due to: pain and decreased endurance  · all lines remained intact throughout ambulation trial  · Comments: Pt with wide ANA and decreased step length with no foot clearance noted when no UE support given. Pt able to clear feet with use of IV pole for balance - likely due to increased sensory input and ability to offload LE in gait. No LOB noted but unsteady 2/2 lack of foot clearance in swing.     Education:   Time provided for education, counseling and discussion of health disposition in regards to patient's current status   All questions answered within PT scope of practice and to patient's satisfaction   PT role in POC to address current functional deficits   Pt educated on proper body mechanics, safety techniques, and energy conservation with PT facilitation and cueing throughout session   Call nursing/pct to transfer to chair/use bathroom. Pt stated understanding.   Whiteboard updated with therapist name and pt's current mobility status documented above   Safe to perform step transfer to/from chair/bedside commode CGA and HHA or IV pole w/ nursing/PCT present   Pt to ambulate 2x/day CGA and IV pole or HHA with nsg/PCT/family in order to maintain functional mobility   Importance of OOB tolerance prn hrs/day to improve lung ventilation and expansion as well as strengthen postural musculature    Patient left HOB elevated with all lines intact, call button in reach and RN notified.    GOALS:   Multidisciplinary Problems     Physical Therapy Goals         Problem: Physical Therapy Goal    Goal Priority Disciplines Outcome Goal Variances Interventions   Physical Therapy Goal     PT, PT/OT Ongoing, Progressing     Description: Goals to be met by: 2022     Patient will increase functional independence with mobility by performin. Sit to stand transfer with Modified [place]  2. Bed to chair transfer with Supervision using LRAD  3. Gait  x 250 feet with Supervision using LRAD and standing rest breaks prn.   4. Ascend/descend 19 stair with left Handrails Stand-by Assistance using LRAD.   5. Lower extremity exercise program x30 reps per handout, with independence                     History:     Past Medical History:   Diagnosis Date    Cirrhosis 2020    Diabetes mellitus, type 2     Diabetic neuropathy 2020    Disorder of kidney and ureter     HTN (hypertension) 2020    Hyperlipidemia 2020    Kidney stones 2020    S/p lithotripsy     Leukocytosis 1/15/2021    Liver mass 2020    CASTILLO (nonalcoholic steatohepatitis) 2020    PAD (peripheral artery disease)     Portal hypertension 2020    Skin cancer 2020       Past Surgical History:   Procedure Laterality Date    COLONOSCOPY  10/2020    ESOPHAGOGASTRODUODENOSCOPY  10/2020    ESOPHAGOGASTRODUODENOSCOPY N/A 2021    Procedure: EGD (ESOPHAGOGASTRODUODENOSCOPY);  Surgeon: Jovan David MD;  Location: [address]);  Service: Endoscopy;  Laterality: N/A;  HCC. Listed for liver transplant. EGD for variceal surveillance.  cardiac clearance and blood thinenr approval received, see telephone encounter 21-BB  fully vaccinated-BB  labs same day of procedure-BB    EXCISION OF HYDROCELE Right     hemorroid surgery      hemorroidectomy      KIDNEY STONE SURGERY      LEFT HEART CATHETERIZATION N/A 2021    Procedure: Left heart cath;  Surgeon: Kris Shelton MD;  Location: [facility] CATH LAB;  Service: Cardiology;   Laterality: N/A;    liver mass removal      LIVER TRANSPLANT N/A 1/6/2022    Procedure: TRANSPLANT, LIVER;  Surgeon: Lizet Garcia MD;  Location: [facility] FLR;  Service: Transplant;  Laterality: N/A;    RIGHT HEART CATHETERIZATION Right 5/7/2021    Procedure: INSERTION, CATHETER, RIGHT HEART;  Surgeon: Jaden Schuster MD;  Location: [facility] CATH LAB;  Service: Cardiology;  Laterality: Right;    TREATMENT OF CARDIAC ARRHYTHMIA N/A 5/19/2021    Procedure: CARDIOVERSION;  Surgeon: Didier Tilley MD;  Location: [facility] EP LAB;  Service: Cardiology;  Laterality: N/A;  a fib, dccv/johny, mac, dm. sscu    TREATMENT OF CARDIAC ARRHYTHMIA N/A 5/31/2021    Procedure: CARDIOVERSION;  Surgeon: Daniel Arambula MD;  Location: [facility] EP LAB;  Service: Cardiology;  Laterality: N/A;  afib, DCCV, anest., DM, 3 prep    TREATMENT OF CARDIAC ARRHYTHMIA N/A 7/7/2021    Procedure: Cardioversion or Defibrillation;  Surgeon: Didier Tilley MD;  Location: [facility] EP LAB;  Service: Cardiology;  Laterality: N/A;  AF, JOHNY (cancel if complaint), DCCV, MAC, DM, 3 Prep    TREATMENT OF CARDIAC ARRHYTHMIA N/A 7/27/2021    Procedure: Cardioversion or Defibrillation;  Surgeon: Didier Tilley MD;  Location: [facility] EP LAB;  Service: Cardiology;  Laterality: N/A;  AF, JOHNY (cx if complaint), DCCV, MAC, DM, 3 Prep       Time Tracking:     PT Received On: 01/07/22  PT Start Time: 1435     PT Stop Time: 1454  PT Total Time (min): 19 min     Billable Minutes: Evaluation 1 procedure and Therapeutic Activity 9 minutes    Solange Caruso, PT, DPT  1/7/2022  Pager: [phone]

## 2022-01-07 NOTE — ASSESSMENT & PLAN NOTE
BG goal 140 - 180     - Discontinue IV insulin infusion protocol  - Start transition drip at 1.2 units/hr (based on steady state while on IIP).  - Start Novolog MDC (150/25) prn for BG excursions.   - BG checks AC/HS/0200    ** Please call Endocrine for any BG related issues **  ** Please notify Endocrine for any change and/or advance in diet**  Lab Results   Component Value Date    HGBA1C 5.8 (H) 01/05/2022       Discharge planning: YANIRA

## 2022-01-07 NOTE — PROGRESS NOTES
TRANSPLANT NOTE:    Admit Date: 1/5/2022    ORGAN:   LIVER  Disease Etiology: Primary Liver Malignancy: Hepatoma (HCC) and Cirrhosis  Donor CMV Status: Positive  Donor HCV Status: Negative  Donor HBcAb: Negative  Donor HBV JAYLIN: Negative  Donor HCV JAYLIN: Negative  Whole or Partial: Whole Liver  Biliary Anastomosis: End to End  Arterial Anatomy: Replaced Left Hepatic from Left Gastric    Tej Purdy is a 66 y.o. male s/p    Donation after Brain Death liver transplant on 1/6/2022 (Liver) for Primary Liver Malignancy: Hepatoma (HCC) and Cirrhosis.  This patient will follow the Steroid Induction protocol.  This patients immunosuppression will include a steroid taper over 5 weeks, Cellcept for 3 month and Prograf maintenance.  Opportunistic infection prophylaxis will include Valcyte for 6 months (CMV D+ R-), Bactrim for 6 months, and nystatin for 4 weeks.  Osteoporosis risk assessment identifies as normal, therapy will include daily ca/vit D.  I have reviewed the pre-op medications and those have been restarted those, as appropriate.

## 2022-01-07 NOTE — PROGRESS NOTES
"[facility] - [facility] Intensive Care  Endocrinology  Progress Note    Admit Date: 1/5/2022     Reason for Consult: Management of T2DM, Hyperglycemia     Surgical Procedure and Date: Liver Transplant 01/06/2022    Diabetes diagnosis year: 2000    Home Diabetes Medications:  per Dr. CINDA Haines 22 units HS  Metformin 1000 mg BID    How often checking glucose at home?  YUSUF (intubated)    BG readings on regimen: YUSUF  Hypoglycemia on the regimen? YUSUF  Missed doses on regimen? YUSUF    Diabetes Complications include:     Hyperglycemia and Diabetic peripheral neuropathy     Complicating diabetes co morbidities:   CIRRHOSIS and Active Cancer (skin), PAD, CASTILLO, HLD,       HPI:   Patient is a 66 y.o. male with a diagnosis of Afib (on coumadin), CASTILLO cirrhosis, and HCC s/p TACE and RFA who is listed for liver transplant with MELD 24. He now presents to the hospital for liver transplant on 01/06/2022. Endocrinology consulted to manage glycemic control s/p transplant surgery.     Lab Results   Component Value Date    HGBA1C 5.8 (H) 01/05/2022                 Interval HPI:   Overnight events: No acute events overnight. Patient on the SICU in room 92908 CVICU/83733 CVICU A. Blood glucose improving. BG at goal on current insulin regimen. Steroid use- Solumedrol 200 mg. 1 Day Post-Op  Renal function- Normal  Vasopressors-  None      Diet diabetic [facility]; 2000 Calorie     Eating:   <25%  Nausea: No  Hypoglycemia and intervention: No  Fever: No  TPN and/or TF: No    /73 (BP Location: Right arm, Patient Position: Lying)   Pulse 79   Temp 99.1 °F (37.3 °C) (Core ([place]))   Resp 10   Ht 6' 3" (1.905 m)   Wt 102.6 kg (226 lb 3.1 oz)   SpO2 (!) 88%   BMI 28.27 kg/m²     Labs Reviewed and Include    Recent Labs   Lab 01/07/22  0353 01/07/22  0353 01/07/22  0800   *   < > 157*   CALCIUM 8.1*   < > 8.7   ALBUMIN 3.6  --   --    PROT 5.4*  --   --       < > 141   K 4.4   < > 4.2   CO2 26   < > 27   CL " "103   < > 103   BUN 19   < > 18   CREATININE 1.1   < > 1.0   ALKPHOS 56  --   --    *  --   --    *  671*   < > 549*   BILITOT 2.6*  --   --     < > = values in this interval not displayed.     Lab Results   Component Value Date    WBC 10.97 01/07/2022    HGB 11.8 (L) 01/07/2022    HCT 34.9 (L) 01/07/2022    MCV 88 01/07/2022    PLT 73 (L) 01/07/2022     No results for input(s): TSH, FREET4 in the last 168 hours.  Lab Results   Component Value Date    HGBA1C 5.8 (H) 01/05/2022       Nutritional status:   Body mass index is 28.27 kg/m².  Lab Results   Component Value Date    ALBUMIN 3.6 01/07/2022    ALBUMIN 3.9 01/07/2022    ALBUMIN 2.9 (L) 01/06/2022     No results found for: PREALBUMIN    Estimated Creatinine Clearance: 91.7 mL/min (based on SCr of 1 mg/dL).    Accu-Checks  Recent Labs     01/06/22  2103 01/06/22  2210 01/06/22  2303 01/07/22  0001 01/07/22  0105 01/07/22  0204 01/07/22  0352 01/07/22  0454 01/07/22  0559 01/07/22  0817   POCTGLUCOSE 128* 144* 158* 147* 157* 165* 151* 149* 161* 159*       Current Medications and/or Treatments Impacting Glycemic Control  Immunotherapy:    Immunosuppressants         Stop Route Frequency     tacrolimus (PROGRAF) 1 mg/mL oral syringe         -- Oral 2 times daily     mycophenolate mofetil 200 mg/mL suspension 1,000 mg         -- Oral 2 times daily        Steroids:   Hormones (From admission, onward)            Start     Stop Route Frequency Ordered    01/12/22 0900  predniSONE tablet 20 mg  (methylprednisolone taper panel)        "Followed by" Linked Group Details    -- Oral Daily 01/06/22 1314    01/11/22 0900  methylPREDNISolone sodium succinate injection 40 mg  (methylprednisolone taper panel)        "Followed by" Linked Group Details    01/12 0859 IV Daily 01/06/22 1314    01/10/22 0900  methylPREDNISolone sodium succinate injection 80 mg  (methylprednisolone taper panel)        "Followed by" Linked Group Details    01/11 8718 IV Daily 01/06/22 2984 " "   01/09/22 0900  methylPREDNISolone sodium succinate injection 120 mg  (methylprednisolone taper panel)        "Followed by" Linked Group Details    01/10 0859 IV Daily 01/06/22 1314    01/08/22 0900  methylPREDNISolone sodium succinate injection 160 mg  (methylprednisolone taper panel)        "Followed by" Linked Group Details    01/09 0859 IV Daily 01/06/22 1314        Pressors:    Autonomic Drugs (From admission, onward)            None        Hyperglycemia/Diabetes Medications:   Antihyperglycemics (From admission, onward)            Start     Stop Route Frequency Ordered    01/07/22 0930  insulin regular in 0.9 % NaCl 100 unit/100 mL (1 unit/mL) infusion        Question:  Enter initial dose (Units/hr):  Answer:  1.2    -- IV Continuous 01/07/22 0826 01/07/22 0925  insulin aspart U-100 pen 0-10 Units         -- SubQ As needed (PRN) 01/07/22 0826          ASSESSMENT and PLAN    * End stage liver disease  Managed per primary team  Avoid hypoglycemia        Type 2 diabetes mellitus  BG goal 140 - 180     - Discontinue IV insulin infusion protocol  - Start transition drip at 1.2 units/hr (based on steady state while on IIP).  - Start Novolog MDC (150/25) prn for BG excursions.   - BG checks AC/HS/0200    ** Please call Endocrine for any BG related issues **  ** Please notify Endocrine for any change and/or advance in diet**  Lab Results   Component Value Date    HGBA1C 5.8 (H) 01/05/2022       Discharge planning: TBD          S/P liver transplant  Managed per primary team  Avoid hypoglycemia        Adverse effect of adrenal cortical steroids, sequela  On steroid therapy per transplant team; may elevate BG readings        Surgical wound present  Optimize BG control to improve wound healing             João Bender, DNP, FNP  Endocrinology  [facility] - Surgical Intensive Care  "

## 2022-01-07 NOTE — PROGRESS NOTES
[facility] - [facility]  Critical Care - Surgery  Progress Note    Patient Name: Tej Purdy  MRN: [id number]  Admission Date: 1/5/2022  Hospital Length of Stay: 2 days  Code Status: Prior  Attending Provider: Leland Munoz MD  Primary Care Provider: Thais Maxwell MD   Principal Problem: End stage liver disease    Subjective:     Hospital/ICU Course:  No notes on file    Interval History/Significant Events:     POD1 s/p liver transplantation  NAEON. AF, HDS now off pressors  Extubated in ICU last night  Pain well controlled  Tolerating clear liquids  Not yet OOB  Drain #1 148ml out, drain #2 252ml out  UOP 5400ml     Follow-up For: Procedure(s) (LRB):  TRANSPLANT, LIVER (N/A)    Post-Operative Day: 1 Day Post-Op    Objective:     Vital Signs (Most Recent):  Temp: 99.1 °F (37.3 °C) (01/07/22 0300)  Pulse: 78 (01/07/22 0600)  Resp: 10 (01/07/22 0600)  BP: 128/60 (01/07/22 0400)  SpO2: 96 % (01/07/22 0600) Vital Signs (24h Range):  Temp:  [99.1 °F (37.3 °C)-99.8 °F (37.7 °C)] 99.1 °F (37.3 °C)  Pulse:  [54-83] 78  Resp:  [6-26] 10  SpO2:  [92 %-100 %] 96 %  BP: ()/(44-67) 128/60  Arterial Line BP: ()/(44-64) 147/58     Weight: 102.6 kg (226 lb 3.1 oz)  Body mass index is 28.27 kg/m².      Intake/Output Summary (Last 24 hours) at 1/7/2022 0648  Last data filed at 1/7/2022 0600  Gross per 24 hour   Intake 8587.71 ml   Output 7800 ml   Net 787.71 ml       Physical Exam  Constitutional:       General: He is not in acute distress.  Eyes:      Extraocular Movements: Extraocular movements intact.      Conjunctiva/sclera: Conjunctivae normal.   Cardiovascular:      Rate and Rhythm: Normal rate and regular rhythm.      Pulses: Normal pulses.   Pulmonary:      Effort: Pulmonary effort is normal.   Abdominal:      General: There is no distension.      Palpations: Abdomen is soft.      Comments: Incision with dressings in place, c/d/i  Drains with serous output  Soft, depressible, appropriately  tender   Musculoskeletal:         General: No swelling.      Cervical back: Normal range of motion and neck supple.   Skin:     General: Skin is warm and dry.      Coloration: Skin is not jaundiced.   Neurological:      General: No focal deficit present.      Mental Status: He is alert and oriented to person, place, and time.   Psychiatric:         Mood and Affect: Mood normal.         Vents:  Vent Mode: Spont (01/06/22 1737)  Ventilator Initiated: Yes (01/06/22 1311)  Set Rate: 18 BPM (01/06/22 1701)  Vt Set: 550 mL (01/06/22 1701)  Pressure Support: 10 cmH20 (01/06/22 1737)  PEEP/CPAP: 5 cmH20 (01/06/22 1737)  Oxygen Concentration (%): 50 (01/06/22 1737)  Peak Airway Pressure: 16 cmH2O (01/06/22 1737)  Plateau Pressure: 0 cmH20 (01/06/22 1737)  Total Ve: 4.95 mL (01/06/22 1737)  Negative Inspiratory Force (cm H2O): -25 (01/06/22 1737)  F/VT Ratio<105 (RSBI): (!) 31.47 (01/06/22 1701)    Lines/Drains/Airways     Central Venous Catheter Line            Introducer 01/06/22 right internal jugular 1 day    Trialysis (Dialysis) Catheter 01/06/22 right internal jugular 1 day    Pulmonary Artery Catheter Assessment  01/06/22 0813 <1 day          Drain                 Closed/Suction Drain 01/06/22 1222 Right Abdomen Bulb 19 Fr. <1 day         Closed/Suction Drain 01/06/22 1222 Right Abdomen Bulb 19 Fr. <1 day         Urethral Catheter 01/06/22 0730 Double-lumen 16 Fr. <1 day          Arterial Line            Arterial Line 01/06/22 0719 Left Radial <1 day    Arterial Line 01/06/22 0756 Left Femoral <1 day          Peripheral Intravenous Line                 Peripheral IV - Single Lumen 01/05/22 2300 20 G Posterior;Right Forearm 1 day         LEONCIO 01/06/22 0737 Left Antecubital <1 day                Significant Labs:    CBC/Anemia Profile:  Recent Labs   Lab 01/06/22  2000 01/07/22  0002 01/07/22  0353   WBC 7.62 9.03 10.08   HGB 11.4* 11.3* 11.3*   HCT 32.9* 33.8* 34.4*   PLT 71* 73* 73*   MCV 89 89 89   RDW 14.6* 14.4 14.3         Chemistries:  Recent Labs   Lab 01/06/22  1122 01/06/22  1122 01/06/22  1319 01/06/22  1600 01/06/22  2000 01/07/22  0002 01/07/22  0353      < > 142   < > 143 142 140   K 3.2*   < > 3.3*   < > 4.6 4.7 4.4   CL  --   --  102   < > 104 105 103   CO2  --   --  21*   < > 25 24 26   BUN  --   --  16   < > 17 19 19   CREATININE  --   --  1.0   < > 1.2 1.2 1.1   CALCIUM  --   --  8.0*   < > 8.5* 8.4* 8.1*   ALBUMIN 2.6*   < > 2.9*  --   --  3.9 3.6   PROT  --   --  4.9*  --   --  5.7* 5.4*   BILITOT  --   --  1.2*  --   --  2.6* 2.6*   ALKPHOS  --   --  55  --   --  55 56   *   < > 452*  --   --  533* 507*   *   < > 732*   < > 902* 816*  792* 655*  671*   MG 1.4*  --  1.4*  --   --  1.2*  --    PHOS  --   --  1.9*  --   --   --   --     < > = values in this interval not displayed.         Significant Imaging:  I have reviewed all pertinent imaging results/findings within the past 24 hours.    Assessment/Plan:     * End stage liver disease  Neurological  - Pain well controlled, on oxy, morphine, gabapentin  - A&Ox4    Respiratory  - Sating well on 1L NC O2  - no increased work of breathing  - Encourage IS  - CXR without acute process, lines in good position    Cardiovascular  - on pressors immediately after OR, now weaned off  - remains hemodynamically stable  - restarting home ammiodarone and metoprolol    Endocrine  - continue insulin drip  - weaned to 1.2 U/hr yesterday    FEN/GI  - Continue CLD  - PRN electrolyte replacements  - Will resuscitate with albumin boluses as needed  - Liver ultrasound pending read this AM  - Continue q4h AST, CBC, PT/INR - may decrease frequency if remain stable today  - Daily tacro and CMP  - Pepcid for GI ppx/ baseline GERD  - Drains serosang, continue at this time    Renal  - High UOP noted post op, trending down  - Continue strict ins and outs  - Alvares in place, may discontinue today if pt remains stable    Heme/ID  - Afebrile, WBC in expected range post  op  - Continue valgancyclovir, bactrim ppx  - On home warfarin for a-fib, reversed pre-op. Continue to hold therapeutic anticoagulation at this time    LDAs  - L femoral a-line  - L radial a-line  - RIJ introducer and PA catheter  - RIJ HD catheter  - Irwin  - DANNIELLE drains x2    Ppx  - SQH, SCDs  - Pepcid  - Bactrim           Critical Care Daily Checklist:    A: Awake: RASS Goal/Actual Goal: RASS Goal: 0-->alert and calm  Actual: [place] Agitation Sedation Scale (RASS): Alert and calm   B: Spontaneous Breathing Trial Performed?     C: SAT & SBT Coordinated?  n/a                      D: Delirium: CAM-ICU Overall CAM-ICU: Negative   E: Early Mobility Performed? No   F: Feeding Goal:    Status:     Current Diet Order   Procedures    Diet clear liquid [facility]; Consistent Carbohydrate; Thin     Order Specific Question:   Indicate patient location for additional diet options:     Answer:   [facility]     Order Specific Question:   Additional Diet Options:     Answer:   Consistent Carbohydrate     Order Specific Question:   Fluid consistency:     Answer:   Thin      AS: Analgesia/Sedation Oxy, morphine, gabapentin   T: Thromboembolic Prophylaxis SQH, SCDs   H: HOB > 300 Yes   U: Stress Ulcer Prophylaxis (if needed) Pepcid   G: Glucose Control Insulin gtt   B: Bowel Function Stool Occurrence: 0   I: Indwelling Catheter (Lines & Irwin) Necessity irwin   D: De-escalation of Antimicrobials/Pharmacotherapies n/a    Plan for the day/ETD Continue current level of care. Discontinue lines if remains stable, follow up ultrasound. Mobilize OOB    Code Status:  Family/Goals of Care: Prior  Continue current level of care       Critical secondary to Patient has a condition that poses threat to life and bodily function: liver failure s/p liver transplant     Critical care was time spent personally by me on the following activities: development of treatment plan with patient or surrogate and bedside caregivers, discussions  with consultants, evaluation of patient's response to treatment, examination of patient, ordering and performing treatments and interventions, ordering and review of laboratory studies, ordering and review of radiographic studies, pulse oximetry, re-evaluation of patient's condition.  This critical care time did not overlap with that of any other provider or involve time for any procedures.     Dalia Kendall MD  Critical Care - Surgery  [facility] - [facility]

## 2022-01-07 NOTE — ASSESSMENT & PLAN NOTE
Neurological  - Pain well controlled, on oxy, morphine, gabapentin  - A&Ox4    Respiratory  - Sating well on 1L NC O2  - no increased work of breathing  - Encourage IS  - CXR without acute process, lines in good position    Cardiovascular  - on pressors immediately after OR, now weaned off  - remains hemodynamically stable  - restarting home ammiodarone and metoprolol    Endocrine  - continue insulin drip  - weaned to 1.2 U/hr yesterday    FEN/GI  - Continue CLD  - PRN electrolyte replacements  - Will resuscitate with albumin boluses as needed  - Liver ultrasound pending read this AM  - Continue q4h AST, CBC, PT/INR - may decrease frequency if remain stable today  - Daily tacro and CMP  - Pepcid for GI ppx/ baseline GERD  - Drains serosang, continue at this time    Renal  - High UOP noted post op, trending down  - Continue strict ins and outs  - Alvares in place, may discontinue today if pt remains stable    Heme/ID  - Afebrile, WBC in expected range post op  - Continue valgancyclovir, bactrim ppx  - On home warfarin for a-fib, reversed pre-op. Continue to hold therapeutic anticoagulation at this time    LDAs  - L femoral a-line  - L radial a-line  - RIJ introducer and PA catheter  - RIJ HD catheter  - Alvares  - DANNIELLE drains x2    Ppx  - SQH, SCDs  - Pepcid  - Bactrim

## 2022-01-07 NOTE — PROGRESS NOTES
Dr. Garcia notified -170s, prn pain med given. MD ordered cleviprex. TORB. See orders for details.Will continue to monitor.

## 2022-01-07 NOTE — SUBJECTIVE & OBJECTIVE
"Interval HPI:   Overnight events: No acute events overnight. Patient on the SICU in room 65159 CVICU/68299 CVICU A. Blood glucose improving. BG at goal on current insulin regimen. Steroid use- Solumedrol 200 mg. 1 Day Post-Op  Renal function- Normal  Vasopressors-  None      Diet diabetic [facility]; 2000 Calorie     Eating:   <25%  Nausea: No  Hypoglycemia and intervention: No  Fever: No  TPN and/or TF: No    /73 (BP Location: Right arm, Patient Position: Lying)   Pulse 79   Temp 99.1 °F (37.3 °C) (Core ([place]))   Resp 10   Ht 6' 3" (1.905 m)   Wt 102.6 kg (226 lb 3.1 oz)   SpO2 (!) 88%   BMI 28.27 kg/m²     Labs Reviewed and Include    Recent Labs   Lab 01/07/22  0353 01/07/22  0353 01/07/22  0800   *   < > 157*   CALCIUM 8.1*   < > 8.7   ALBUMIN 3.6  --   --    PROT 5.4*  --   --       < > 141   K 4.4   < > 4.2   CO2 26   < > 27      < > 103   BUN 19   < > 18   CREATININE 1.1   < > 1.0   ALKPHOS 56  --   --    *  --   --    *  671*   < > 549*   BILITOT 2.6*  --   --     < > = values in this interval not displayed.     Lab Results   Component Value Date    WBC 10.97 01/07/2022    HGB 11.8 (L) 01/07/2022    HCT 34.9 (L) 01/07/2022    MCV 88 01/07/2022    PLT 73 (L) 01/07/2022     No results for input(s): TSH, FREET4 in the last 168 hours.  Lab Results   Component Value Date    HGBA1C 5.8 (H) 01/05/2022       Nutritional status:   Body mass index is 28.27 kg/m².  Lab Results   Component Value Date    ALBUMIN 3.6 01/07/2022    ALBUMIN 3.9 01/07/2022    ALBUMIN 2.9 (L) 01/06/2022     No results found for: PREALBUMIN    Estimated Creatinine Clearance: 91.7 mL/min (based on SCr of 1 mg/dL).    Accu-Checks  Recent Labs     01/06/22  2103 01/06/22  2210 01/06/22  2303 01/07/22  0001 01/07/22  0105 01/07/22  0204 01/07/22  0352 01/07/22  0454 01/07/22  0559 01/07/22  0817   POCTGLUCOSE 128* 144* 158* 147* 157* 165* 151* 149* 161* 159*       Current Medications and/or " "Treatments Impacting Glycemic Control  Immunotherapy:    Immunosuppressants         Stop Route Frequency     tacrolimus (PROGRAF) 1 mg/mL oral syringe         -- Oral 2 times daily     mycophenolate mofetil 200 mg/mL suspension 1,000 mg         -- Oral 2 times daily        Steroids:   Hormones (From admission, onward)            Start     Stop Route Frequency Ordered    01/12/22 0900  predniSONE tablet 20 mg  (methylprednisolone taper panel)        "Followed by" Linked Group Details    -- Oral Daily 01/06/22 1314    01/11/22 0900  methylPREDNISolone sodium succinate injection 40 mg  (methylprednisolone taper panel)        "Followed by" Linked Group Details    01/12 0859 IV Daily 01/06/22 1314    01/10/22 0900  methylPREDNISolone sodium succinate injection 80 mg  (methylprednisolone taper panel)        "Followed by" Linked Group Details    01/11 0859 IV Daily 01/06/22 1314    01/09/22 0900  methylPREDNISolone sodium succinate injection 120 mg  (methylprednisolone taper panel)        "Followed by" Linked Group Details    01/10 0859 IV Daily 01/06/22 1314    01/08/22 0900  methylPREDNISolone sodium succinate injection 160 mg  (methylprednisolone taper panel)        "Followed by" Linked Group Details    01/09 0859 IV Daily 01/06/22 1314        Pressors:    Autonomic Drugs (From admission, onward)            None        Hyperglycemia/Diabetes Medications:   Antihyperglycemics (From admission, onward)            Start     Stop Route Frequency Ordered    01/07/22 0930  insulin regular in 0.9 % NaCl 100 unit/100 mL (1 unit/mL) infusion        Question:  Enter initial dose (Units/hr):  Answer:  1.2    -- IV Continuous 01/07/22 0826 01/07/22 0925  insulin aspart U-100 pen 0-10 Units         -- SubQ As needed (PRN) 01/07/22 0826        "

## 2022-01-07 NOTE — PROGRESS NOTES
Met with patient and his wife in the ICU this morning.  Gave him My New Journey: Living Smart After My Liver Transplant.  Asked them to read the book in preparation for education.  Allowed time for questions and answers.

## 2022-01-08 PROBLEM — T14.8XXA SURGICAL WOUND PRESENT: Status: RESOLVED | Noted: 2022-01-06 | Resolved: 2022-01-08

## 2022-01-08 PROBLEM — K72.10 END STAGE LIVER DISEASE: Status: RESOLVED | Noted: 2022-01-05 | Resolved: 2022-01-08

## 2022-01-08 LAB
ALBUMIN SERPL BCP-MCNC: 3.4 G/DL (ref 3.5–5.2)
ALBUMIN SERPL BCP-MCNC: 3.7 G/DL (ref 3.5–5.2)
ALP SERPL-CCNC: 63 U/L (ref 55–135)
ALP SERPL-CCNC: 64 U/L (ref 55–135)
ALT SERPL W/O P-5'-P-CCNC: 411 U/L (ref 10–44)
ALT SERPL W/O P-5'-P-CCNC: 483 U/L (ref 10–44)
ANION GAP SERPL CALC-SCNC: 10 MMOL/L (ref 8–16)
ANION GAP SERPL CALC-SCNC: 12 MMOL/L (ref 8–16)
APTT BLDCRRT: 24.8 SEC (ref 21–32)
AST SERPL-CCNC: 231 U/L (ref 10–40)
AST SERPL-CCNC: 326 U/L (ref 10–40)
BASOPHILS # BLD AUTO: 0.01 K/UL (ref 0–0.2)
BASOPHILS # BLD AUTO: 0.01 K/UL (ref 0–0.2)
BASOPHILS NFR BLD: 0.1 % (ref 0–1.9)
BASOPHILS NFR BLD: 0.1 % (ref 0–1.9)
BILIRUB SERPL-MCNC: 1.2 MG/DL (ref 0.1–1)
BILIRUB SERPL-MCNC: 1.4 MG/DL (ref 0.1–1)
BLD PROD TYP BPU: NORMAL
BLOOD UNIT EXPIRATION DATE: NORMAL
BLOOD UNIT TYPE CODE: 5100
BLOOD UNIT TYPE CODE: 9500
BLOOD UNIT TYPE: NORMAL
BUN SERPL-MCNC: 36 MG/DL (ref 8–23)
BUN SERPL-MCNC: 46 MG/DL (ref 8–23)
CALCIUM SERPL-MCNC: 8.3 MG/DL (ref 8.7–10.5)
CALCIUM SERPL-MCNC: 8.7 MG/DL (ref 8.7–10.5)
CHLORIDE SERPL-SCNC: 95 MMOL/L (ref 95–110)
CHLORIDE SERPL-SCNC: 97 MMOL/L (ref 95–110)
CO2 SERPL-SCNC: 27 MMOL/L (ref 23–29)
CO2 SERPL-SCNC: 28 MMOL/L (ref 23–29)
CODING SYSTEM: NORMAL
CREAT SERPL-MCNC: 1.4 MG/DL (ref 0.5–1.4)
CREAT SERPL-MCNC: 1.5 MG/DL (ref 0.5–1.4)
DIFFERENTIAL METHOD: ABNORMAL
DIFFERENTIAL METHOD: ABNORMAL
DISPENSE STATUS: NORMAL
EOSINOPHIL # BLD AUTO: 0 K/UL (ref 0–0.5)
EOSINOPHIL # BLD AUTO: 0 K/UL (ref 0–0.5)
EOSINOPHIL NFR BLD: 0 % (ref 0–8)
EOSINOPHIL NFR BLD: 0 % (ref 0–8)
ERYTHROCYTE [DISTWIDTH] IN BLOOD BY AUTOMATED COUNT: 14 % (ref 11.5–14.5)
ERYTHROCYTE [DISTWIDTH] IN BLOOD BY AUTOMATED COUNT: 14.1 % (ref 11.5–14.5)
EST. GFR  (AFRICAN AMERICAN): 54.5 ML/MIN/1.73 M^2
EST. GFR  (AFRICAN AMERICAN): 59.3 ML/MIN/1.73 M^2
EST. GFR  (NON AFRICAN AMERICAN): 47.2 ML/MIN/1.73 M^2
EST. GFR  (NON AFRICAN AMERICAN): 51.3 ML/MIN/1.73 M^2
GLUCOSE SERPL-MCNC: 171 MG/DL (ref 70–110)
GLUCOSE SERPL-MCNC: 233 MG/DL (ref 70–110)
HCT VFR BLD AUTO: 34 % (ref 40–54)
HCT VFR BLD AUTO: 36.5 % (ref 40–54)
HEPATITIS C VIRUS (HCV) RNA DETECTION/QUANTIFICATION RT-PCR: NORMAL IU/ML
HGB BLD-MCNC: 11.2 G/DL (ref 14–18)
HGB BLD-MCNC: 12 G/DL (ref 14–18)
IMM GRANULOCYTES # BLD AUTO: 0.18 K/UL (ref 0–0.04)
IMM GRANULOCYTES # BLD AUTO: 0.25 K/UL (ref 0–0.04)
IMM GRANULOCYTES NFR BLD AUTO: 1.2 % (ref 0–0.5)
IMM GRANULOCYTES NFR BLD AUTO: 2.1 % (ref 0–0.5)
INR PPP: 1.1 (ref 0.8–1.2)
LYMPHOCYTES # BLD AUTO: 0.2 K/UL (ref 1–4.8)
LYMPHOCYTES # BLD AUTO: 0.4 K/UL (ref 1–4.8)
LYMPHOCYTES NFR BLD: 1.5 % (ref 18–48)
LYMPHOCYTES NFR BLD: 2.6 % (ref 18–48)
MCH RBC QN AUTO: 29.7 PG (ref 27–31)
MCH RBC QN AUTO: 30 PG (ref 27–31)
MCHC RBC AUTO-ENTMCNC: 32.9 G/DL (ref 32–36)
MCHC RBC AUTO-ENTMCNC: 32.9 G/DL (ref 32–36)
MCV RBC AUTO: 90 FL (ref 82–98)
MCV RBC AUTO: 91 FL (ref 82–98)
MONOCYTES # BLD AUTO: 0.7 K/UL (ref 0.3–1)
MONOCYTES # BLD AUTO: 1.2 K/UL (ref 0.3–1)
MONOCYTES NFR BLD: 5.6 % (ref 4–15)
MONOCYTES NFR BLD: 7.4 % (ref 4–15)
NEUTROPHILS # BLD AUTO: 11.1 K/UL (ref 1.8–7.7)
NEUTROPHILS # BLD AUTO: 13.9 K/UL (ref 1.8–7.7)
NEUTROPHILS NFR BLD: 88.7 % (ref 38–73)
NEUTROPHILS NFR BLD: 90.7 % (ref 38–73)
NRBC BLD-RTO: 0 /100 WBC
NRBC BLD-RTO: 0 /100 WBC
NUM UNITS TRANS FFP: NORMAL
PLATELET # BLD AUTO: 89 K/UL (ref 150–450)
PLATELET # BLD AUTO: 91 K/UL (ref 150–450)
PMV BLD AUTO: 11.5 FL (ref 9.2–12.9)
PMV BLD AUTO: 12.3 FL (ref 9.2–12.9)
POCT GLUCOSE: 179 MG/DL (ref 70–110)
POCT GLUCOSE: 199 MG/DL (ref 70–110)
POCT GLUCOSE: 215 MG/DL (ref 70–110)
POCT GLUCOSE: 241 MG/DL (ref 70–110)
POCT GLUCOSE: 255 MG/DL (ref 70–110)
POTASSIUM SERPL-SCNC: 4.6 MMOL/L (ref 3.5–5.1)
POTASSIUM SERPL-SCNC: 4.8 MMOL/L (ref 3.5–5.1)
PROT SERPL-MCNC: 5.5 G/DL (ref 6–8.4)
PROT SERPL-MCNC: 5.9 G/DL (ref 6–8.4)
PROTHROMBIN TIME: 11.9 SEC (ref 9–12.5)
RBC # BLD AUTO: 3.73 M/UL (ref 4.6–6.2)
RBC # BLD AUTO: 4.04 M/UL (ref 4.6–6.2)
SODIUM SERPL-SCNC: 133 MMOL/L (ref 136–145)
SODIUM SERPL-SCNC: 136 MMOL/L (ref 136–145)
TACROLIMUS BLD-MCNC: 9.4 NG/ML (ref 5–15)
WBC # BLD AUTO: 12.19 K/UL (ref 3.9–12.7)
WBC # BLD AUTO: 15.6 K/UL (ref 3.9–12.7)

## 2022-01-08 PROCEDURE — 86644 CMV ANTIBODY: CPT | Performed by: STUDENT IN AN ORGANIZED HEALTH CARE EDUCATION/TRAINING PROGRAM

## 2022-01-08 PROCEDURE — 63600175 PHARM REV CODE 636 W HCPCS: Performed by: STUDENT IN AN ORGANIZED HEALTH CARE EDUCATION/TRAINING PROGRAM

## 2022-01-08 PROCEDURE — 85610 PROTHROMBIN TIME: CPT | Performed by: STUDENT IN AN ORGANIZED HEALTH CARE EDUCATION/TRAINING PROGRAM

## 2022-01-08 PROCEDURE — 93005 ELECTROCARDIOGRAM TRACING: CPT

## 2022-01-08 PROCEDURE — 63600175 PHARM REV CODE 636 W HCPCS: Performed by: NURSE PRACTITIONER

## 2022-01-08 PROCEDURE — 85025 COMPLETE CBC W/AUTO DIFF WBC: CPT | Mod: 91 | Performed by: NURSE PRACTITIONER

## 2022-01-08 PROCEDURE — C9399 UNCLASSIFIED DRUGS OR BIOLOG: HCPCS | Performed by: NURSE PRACTITIONER

## 2022-01-08 PROCEDURE — 85730 THROMBOPLASTIN TIME PARTIAL: CPT | Performed by: STUDENT IN AN ORGANIZED HEALTH CARE EDUCATION/TRAINING PROGRAM

## 2022-01-08 PROCEDURE — 25000003 PHARM REV CODE 250: Performed by: STUDENT IN AN ORGANIZED HEALTH CARE EDUCATION/TRAINING PROGRAM

## 2022-01-08 PROCEDURE — 99232 PR SUBSEQUENT HOSPITAL CARE,LEVL II: ICD-10-PCS | Mod: ,,, | Performed by: NURSE PRACTITIONER

## 2022-01-08 PROCEDURE — 80053 COMPREHEN METABOLIC PANEL: CPT | Mod: 91 | Performed by: NURSE PRACTITIONER

## 2022-01-08 PROCEDURE — 93010 EKG 12-LEAD: ICD-10-PCS | Mod: ,,, | Performed by: INTERNAL MEDICINE

## 2022-01-08 PROCEDURE — 93010 ELECTROCARDIOGRAM REPORT: CPT | Mod: ,,, | Performed by: INTERNAL MEDICINE

## 2022-01-08 PROCEDURE — 85025 COMPLETE CBC W/AUTO DIFF WBC: CPT | Performed by: STUDENT IN AN ORGANIZED HEALTH CARE EDUCATION/TRAINING PROGRAM

## 2022-01-08 PROCEDURE — 20600001 HC STEP DOWN PRIVATE ROOM

## 2022-01-08 PROCEDURE — 25000003 PHARM REV CODE 250: Performed by: NURSE PRACTITIONER

## 2022-01-08 PROCEDURE — 99232 SBSQ HOSP IP/OBS MODERATE 35: CPT | Mod: ,,, | Performed by: NURSE PRACTITIONER

## 2022-01-08 PROCEDURE — 80053 COMPREHEN METABOLIC PANEL: CPT | Performed by: STUDENT IN AN ORGANIZED HEALTH CARE EDUCATION/TRAINING PROGRAM

## 2022-01-08 PROCEDURE — 80197 ASSAY OF TACROLIMUS: CPT | Performed by: STUDENT IN AN ORGANIZED HEALTH CARE EDUCATION/TRAINING PROGRAM

## 2022-01-08 PROCEDURE — 86850 RBC ANTIBODY SCREEN: CPT | Performed by: TRANSPLANT SURGERY

## 2022-01-08 RX ORDER — IBUPROFEN 200 MG
16 TABLET ORAL
Status: DISCONTINUED | OUTPATIENT
Start: 2022-01-08 | End: 2022-01-13 | Stop reason: HOSPADM

## 2022-01-08 RX ORDER — METOPROLOL TARTRATE 25 MG/1
25 TABLET, FILM COATED ORAL ONCE
Status: COMPLETED | OUTPATIENT
Start: 2022-01-08 | End: 2022-01-08

## 2022-01-08 RX ORDER — INSULIN ASPART 100 [IU]/ML
1-10 INJECTION, SOLUTION INTRAVENOUS; SUBCUTANEOUS
Status: DISCONTINUED | OUTPATIENT
Start: 2022-01-08 | End: 2022-01-13 | Stop reason: HOSPADM

## 2022-01-08 RX ORDER — GLUCAGON 1 MG
1 KIT INJECTION
Status: DISCONTINUED | OUTPATIENT
Start: 2022-01-08 | End: 2022-01-13 | Stop reason: HOSPADM

## 2022-01-08 RX ORDER — TACROLIMUS 0.5 MG/1
0.5 CAPSULE ORAL 2 TIMES DAILY
Status: DISCONTINUED | OUTPATIENT
Start: 2022-01-09 | End: 2022-01-10

## 2022-01-08 RX ORDER — IBUPROFEN 200 MG
24 TABLET ORAL
Status: DISCONTINUED | OUTPATIENT
Start: 2022-01-08 | End: 2022-01-13 | Stop reason: HOSPADM

## 2022-01-08 RX ORDER — LIDOCAINE HYDROCHLORIDE 10 MG/ML
5 INJECTION INFILTRATION; PERINEURAL ONCE
Status: COMPLETED | OUTPATIENT
Start: 2022-01-08 | End: 2022-01-08

## 2022-01-08 RX ADMIN — INSULIN ASPART 2 UNITS: 100 INJECTION, SOLUTION INTRAVENOUS; SUBCUTANEOUS at 08:01

## 2022-01-08 RX ADMIN — OXYCODONE HYDROCHLORIDE 10 MG: 10 TABLET ORAL at 03:01

## 2022-01-08 RX ADMIN — INSULIN ASPART 4 UNITS: 100 INJECTION, SOLUTION INTRAVENOUS; SUBCUTANEOUS at 06:01

## 2022-01-08 RX ADMIN — INSULIN ASPART 4 UNITS: 100 INJECTION, SOLUTION INTRAVENOUS; SUBCUTANEOUS at 12:01

## 2022-01-08 RX ADMIN — TAMSULOSIN HYDROCHLORIDE 0.4 MG: 0.4 CAPSULE ORAL at 08:01

## 2022-01-08 RX ADMIN — MYCOPHENOLATE MOFETIL 1000 MG: 250 CAPSULE ORAL at 08:01

## 2022-01-08 RX ADMIN — OXYCODONE HYDROCHLORIDE 10 MG: 10 TABLET ORAL at 06:01

## 2022-01-08 RX ADMIN — HEPARIN SODIUM 5000 UNITS: 5000 INJECTION INTRAVENOUS; SUBCUTANEOUS at 01:01

## 2022-01-08 RX ADMIN — MUPIROCIN 1 G: 20 OINTMENT TOPICAL at 08:01

## 2022-01-08 RX ADMIN — MUPIROCIN 1 G: 20 OINTMENT TOPICAL at 09:01

## 2022-01-08 RX ADMIN — METOPROLOL TARTRATE 25 MG: 25 TABLET, FILM COATED ORAL at 01:01

## 2022-01-08 RX ADMIN — SERTRALINE HYDROCHLORIDE 25 MG: 25 TABLET ORAL at 09:01

## 2022-01-08 RX ADMIN — DOCUSATE SODIUM 100 MG: 100 CAPSULE ORAL at 09:01

## 2022-01-08 RX ADMIN — DOCUSATE SODIUM 100 MG: 100 CAPSULE ORAL at 08:01

## 2022-01-08 RX ADMIN — NYSTATIN 500000 UNITS: 500000 SUSPENSION ORAL at 08:01

## 2022-01-08 RX ADMIN — INSULIN ASPART 2 UNITS: 100 INJECTION, SOLUTION INTRAVENOUS; SUBCUTANEOUS at 02:01

## 2022-01-08 RX ADMIN — OXYCODONE HYDROCHLORIDE 10 MG: 10 TABLET ORAL at 07:01

## 2022-01-08 RX ADMIN — NYSTATIN 500000 UNITS: 500000 SUSPENSION ORAL at 01:01

## 2022-01-08 RX ADMIN — AMIODARONE HYDROCHLORIDE 200 MG: 200 TABLET ORAL at 09:01

## 2022-01-08 RX ADMIN — OXYCODONE HYDROCHLORIDE 10 MG: 10 TABLET ORAL at 02:01

## 2022-01-08 RX ADMIN — FAMOTIDINE 20 MG: 20 TABLET ORAL at 08:01

## 2022-01-08 RX ADMIN — MYCOPHENOLATE MOFETIL 1000 MG: 250 CAPSULE ORAL at 09:01

## 2022-01-08 RX ADMIN — METOPROLOL SUCCINATE 50 MG: 50 TABLET, EXTENDED RELEASE ORAL at 09:01

## 2022-01-08 RX ADMIN — METHYLPREDNISOLONE SODIUM SUCCINATE 160 MG: 40 INJECTION, POWDER, FOR SOLUTION INTRAMUSCULAR; INTRAVENOUS at 09:01

## 2022-01-08 RX ADMIN — TACROLIMUS 1 MG: 1 CAPSULE ORAL at 09:01

## 2022-01-08 RX ADMIN — LIDOCAINE HYDROCHLORIDE 5 ML: 10 INJECTION, SOLUTION INFILTRATION; PERINEURAL at 02:01

## 2022-01-08 RX ADMIN — DOCUSATE SODIUM 100 MG: 100 CAPSULE ORAL at 01:01

## 2022-01-08 RX ADMIN — INSULIN DETEMIR 28 UNITS: 100 INJECTION, SOLUTION SUBCUTANEOUS at 12:01

## 2022-01-08 RX ADMIN — HEPARIN SODIUM 5000 UNITS: 5000 INJECTION INTRAVENOUS; SUBCUTANEOUS at 08:01

## 2022-01-08 RX ADMIN — OXYCODONE HYDROCHLORIDE 10 MG: 10 TABLET ORAL at 11:01

## 2022-01-08 RX ADMIN — HEPARIN SODIUM 5000 UNITS: 5000 INJECTION INTRAVENOUS; SUBCUTANEOUS at 06:01

## 2022-01-08 RX ADMIN — NYSTATIN 500000 UNITS: 500000 SUSPENSION ORAL at 09:01

## 2022-01-08 NOTE — ASSESSMENT & PLAN NOTE
-H/O A fib restarted Hhome metoprolol and amiodarone (holding Coumadin).   -Plan for Eliquis over wkd verses Monday.

## 2022-01-08 NOTE — SUBJECTIVE & OBJECTIVE
"Interval HPI:   Overnight events: No acute events overnight. Patient on the TSU in room 30309/36274 A. Blood glucose stable. BG at goal on current insulin regimen. Steroid use- Solumedrol 160 mg. 2 Days Post-Op  Renal function- Abnormal - Creatinine 1.5  Vasopressors-  None      Diet diabetic [facility];  Calorie     Eatin%  Nausea: No  Hypoglycemia and intervention: No  Fever: No  TPN and/or TF: No      /73   Pulse 79   Temp 98.3 °F (36.8 °C)   Resp 18   Ht 6' 3" (1.905 m)   Wt 102.2 kg (225 lb 5 oz)   SpO2 (!) 93%   BMI 28.16 kg/m²     Labs Reviewed and Include    Recent Labs   Lab 22  0353   *   CALCIUM 8.7   ALBUMIN 3.7   PROT 5.9*      K 4.8   CO2 27   CL 97   BUN 36*   CREATININE 1.5*   ALKPHOS 64   *   *   BILITOT 1.4*     Lab Results   Component Value Date    WBC 15.60 (H) 2022    HGB 12.0 (L) 2022    HCT 36.5 (L) 2022    MCV 90 2022    PLT 91 (L) 2022     No results for input(s): TSH, FREET4 in the last 168 hours.  Lab Results   Component Value Date    HGBA1C 5.8 (H) 2022       Nutritional status:   Body mass index is 28.16 kg/m².  Lab Results   Component Value Date    ALBUMIN 3.7 2022    ALBUMIN 3.6 2022    ALBUMIN 3.9 2022     No results found for: PREALBUMIN    Estimated Creatinine Clearance: 61.1 mL/min (A) (based on SCr of 1.5 mg/dL (H)).    Accu-Checks  Recent Labs     22  0204 22  0352 22  0454 22  0559 22  0817 22  1233 22  1726 22  2147 22  0158 22  0747   POCTGLUCOSE 165* 151* 149* 161* 159* 180* 153* 225* 199* 179*       Current Medications and/or Treatments Impacting Glycemic Control  Immunotherapy:    Immunosuppressants         Stop Route Frequency     tacrolimus capsule 0.5 mg         -- Oral 2 times daily     mycophenolate capsule 1,000 mg         -- Oral 2 times daily        Steroids:   Hormones (From admission, " "onward)            Start     Stop Route Frequency Ordered    01/12/22 0900  predniSONE tablet 20 mg  (methylprednisolone taper panel)        "Followed by" Linked Group Details    -- Oral Daily 01/06/22 1314    01/11/22 0900  methylPREDNISolone sodium succinate injection 40 mg  (methylprednisolone taper panel)        "Followed by" Linked Group Details    01/12 0859 IV Daily 01/06/22 1314    01/10/22 0900  methylPREDNISolone sodium succinate injection 80 mg  (methylprednisolone taper panel)        "Followed by" Linked Group Details    01/11 0859 IV Daily 01/06/22 1314    01/09/22 0900  methylPREDNISolone sodium succinate injection 120 mg  (methylprednisolone taper panel)        "Followed by" Linked Group Details    01/10 0859 IV Daily 01/06/22 1314        Pressors:    Autonomic Drugs (From admission, onward)            None        Hyperglycemia/Diabetes Medications:   Antihyperglycemics (From admission, onward)            Start     Stop Route Frequency Ordered    01/08/22 1100  insulin detemir U-100 pen 28 Units         -- SubQ Daily 01/08/22 0947    01/08/22 1047  insulin aspart U-100 pen 1-10 Units         -- SubQ Before meals & nightly PRN 01/08/22 0947        "

## 2022-01-08 NOTE — PROGRESS NOTES
[facility]  Liver Transplant  Progress Note    Patient Name: Tej Purdy  MRN: [id number]  Admission Date: 1/5/2022  Hospital Length of Stay: 3 days  Code Status: Full Code  Primary Care Provider: Thais Maxwell MD  Post-Operative Day: 2    ORGAN:   LIVER  Disease Etiology: Primary Liver Malignancy: Hepatoma (HCC) and Cirrhosis  Donor Type:   Donation after Brain Death  CDC High Risk:   No  Donor CMV Status:   Donor CMV Status: Positive  Donor HBcAB:   Negative  Donor HCV Status:   Negative  Donor HBV JAYLIN: Negative  Donor HCV JAYLIN: Negative  Whole or Partial: Whole Liver  Biliary Anastomosis: End to End  Arterial Anatomy: Replaced Left Hepatic from Left Gastric  Subjective:     History of Present Illness:  Mr Tej Purdy is a 65 y/o M with Afib (on coumadin), CASTILLO cirrhosis, and HCC s/p TACE and RFA who is listed for liver transplant with MELD 24. He now presents to the hospital for liver transplant. He reports being in his usual state of health. Denies fever/chills. Pre op labs and imaging pending.           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 Replaced LHA, reconstructed.  H/O A fib resumed home metoprolol and amiodarone holding Warfarin, Plan for Eliquis over wkd verses Monday. No acute events overnight. Pt reports feeling well this am. Progressing well post-op. Tolerating diet, ambulating, +flatus -BM.  Cr level elevated today encouraged fluid intake.  Pt improving UOP. LFTs stable.  VSS, monitor.         Scheduled Meds:   amiodarone  200 mg Oral Daily    docusate sodium  100 mg Oral TID    famotidine  20 mg Oral Nightly    heparin (porcine)  5,000 Units Subcutaneous Q8H    insulin detemir U-100  28 Units Subcutaneous Daily    LIDOcaine HCL 10 mg/ml (1%)  5 mL Other Once    [START ON 1/9/2022] methylPREDNISolone sodium succinate injection  120 mg Intravenous Daily    Followed by    [START ON  1/10/2022] methylPREDNISolone sodium succinate injection  80 mg Intravenous Daily    Followed by    [START ON 1/11/2022] methylPREDNISolone sodium succinate injection  40 mg Intravenous Daily    Followed by    [START ON 1/12/2022] predniSONE  20 mg Oral Daily    metoprolol succinate  50 mg Oral Daily    mupirocin  1 g Nasal BID    mycophenolate  1,000 mg Oral BID    nystatin  500,000 Units Mouth/Throat TID PC    sertraline  25 mg Oral Daily    [START ON 1/13/2022] sulfamethoxazole-trimethoprim 400-80mg  1 tablet Oral Daily AM    [START ON 1/9/2022] tacrolimus  0.5 mg Oral BID    tamsulosin  0.4 mg Oral QHS    [START ON 1/16/2022] valGANciclovir  450 mg Oral Daily     Continuous Infusions:  PRN Meds:dextrose 50%, dextrose 50%, gabapentin, glucagon (human recombinant), glucose, glucose, insulin aspart U-100, oxyCODONE, oxyCODONE, sodium chloride 0.9%    Review of Systems   Constitutional: Positive for activity change and appetite change.   Eyes: Negative for visual disturbance.   Respiratory: Negative for cough and shortness of breath.    Cardiovascular: Negative for chest pain, palpitations and leg swelling.   Gastrointestinal: Positive for abdominal pain. Negative for abdominal distention, constipation, diarrhea, nausea and vomiting.   Genitourinary: Negative for difficulty urinating.   Musculoskeletal: Negative for arthralgias.   Skin: Negative for wound.   Allergic/Immunologic: Positive for immunocompromised state.   Neurological: Negative for dizziness.   Hematological: Negative for adenopathy. Does not bruise/bleed easily.   Psychiatric/Behavioral: Negative for agitation.     Objective:     Vital Signs (Most Recent):  Temp: 98.3 °F (36.8 °C) (01/08/22 0845)  Pulse: 79 (01/08/22 0845)  Resp: 18 (01/08/22 1120)  BP: 138/73 (01/08/22 0930)  SpO2: (!) 93 % (01/08/22 0845) Vital Signs (24h Range):  Temp:  [98 °F (36.7 °C)-98.9 °F (37.2 °C)] 98.3 °F (36.8 °C)  Pulse:  [60-79] 79  Resp:  [18-24] 18  SpO2:   [92 %-94 %] 93 %  BP: (104-138)/(57-73) 138/73     Weight: 102.2 kg (225 lb 5 oz)  Body mass index is 28.16 kg/m².    Intake/Output - Last 3 Shifts       01/06 0700  01/07 0659 01/07 0700 01/08 0659 01/08 0700 01/09 0659    P.O.  840     I.V. (mL/kg) 6494.7 (63.3) 23.9 (0.2)     Blood 1288      IV Piggyback 805.1 89.1     Total Intake(mL/kg) 8587.7 (83.7) 953 (9.3)     Urine (mL/kg/hr) 5400 (2.2) 1250 (0.5) 150 (0.2)    Drains 400 200 70    Stool  0 0    Blood 2000      Total Output 7800 1450 220    Net +787.7 -497 -220           Stool Occurrence  0 x 0 x          Physical Exam  Vitals and nursing note reviewed.   Constitutional:       Appearance: He is well-developed and well-nourished.   HENT:      Head: Normocephalic.   Eyes:      Conjunctiva/sclera: Conjunctivae normal.   Cardiovascular:      Rate and Rhythm: Normal rate and regular rhythm.      Heart sounds: No murmur heard.      Pulmonary:      Effort: Pulmonary effort is normal.      Breath sounds: Normal breath sounds.   Abdominal:      General: Bowel sounds are normal. There is no distension.      Palpations: Abdomen is soft.      Tenderness: There is no abdominal tenderness.          Comments: Staples CDI    Musculoskeletal:         General: Normal range of motion.      Cervical back: Normal range of motion.   Skin:     General: Skin is warm and dry.      Capillary Refill: Capillary refill takes less than 2 seconds.   Neurological:      Mental Status: He is alert and oriented to person, place, and time.   Psychiatric:         Mood and Affect: Mood and affect normal.         Behavior: Behavior normal.         Thought Content: Thought content normal.         Judgment: Judgment normal.         Laboratory:  Immunosuppressants         Stop Route Frequency     tacrolimus capsule 0.5 mg         -- Oral 2 times daily     mycophenolate capsule 1,000 mg         -- Oral 2 times daily        CBC:   Recent Labs   Lab 01/08/22  0353   WBC 15.60*   RBC 4.04*   HGB  12.0*   HCT 36.5*   PLT 91*   MCV 90   MCH 29.7   MCHC 32.9     BMP:   Recent Labs   Lab 01/08/22  0353   *      K 4.8   CL 97   CO2 27   BUN 36*   CREATININE 1.5*   CALCIUM 8.7     CMP:   Recent Labs   Lab 01/08/22  0353   *   CALCIUM 8.7   ALBUMIN 3.7   PROT 5.9*      K 4.8   CO2 27   CL 97   BUN 36*   CREATININE 1.5*   ALKPHOS 64   *   *   BILITOT 1.4*     Coagulation:   Recent Labs   Lab 01/08/22  0353   INR 1.1   APTT 24.8     Labs within the past 24 hours have been reviewed.    Diagnostic Results:  US Liver Transplant Post:  Results for orders placed during the hospital encounter of 01/05/22    US Doppler Liver Transplant Post (xpd)    Narrative  EXAMINATION:  US DOPPLER LIVER TRANSPLANT POST (XPD)    CLINICAL HISTORY:  Assess perfusion following liver transplant;    TECHNIQUE:  Limited abdominal ultrasound of the transplant liver with Doppler evaluation.  Color and spectral Doppler were performed.    COMPARISON:  None.    FINDINGS:  Patient is status post orthotopic liver transplant 01/06/2022.  Liver allograft is normal in size.  The liver demonstrates homogeneous echotexture.  No focal hepatic lesions are seen.  No fluid collections.    The common duct is not dilated, measuring 3 mm.  No dilated intrahepatic radicles are seen.    Color flow and spectral waveform analysis was performed.  The main portal vein, right portal vein, left portal vein, middle hepatic vein, right hepatic vein, left hepatic vein, and IVC are patent with proper directional flow.    Anastomosis site of the main hepatic artery demonstrates a peak systolic velocity 103 cm/sec.    Main hepatic artery demonstrates resistive index 0.82 with normal waveform.    Left hepatic artery shows resistive index 0.71 with normal waveform.    Anterior branch of the right hepatic artery demonstrates resistive index 0.76 with normal waveform.    Posterior branch of the right hepatic artery demonstrates resistive  index 0.81 with normal waveform.    Impression  Satisfactory Doppler evaluation of liver allograft.    Findings were discussed with Dr. Munoz by Dr. Alvarenga at approximately 08:10 a.m. on 01/07/2022.    Electronically signed by resident: David Meléndez  Date:    01/07/2022  Time:    06:28    Electronically signed by: Kris Alvarenga MD  Date:    01/07/2022  Time:    08:12    Debility/Functional status: Patient debilitated by evidence of Weakness. Physical and occupational therapy ordered daily to evaluate and treat. Debility was: present on admission.    Assessment/Plan:     Adverse effect of adrenal cortical steroids, sequela  -monitor       Prophylactic immunotherapy  - continue prograf  - continue to monitor for toxic side effects and check daily levels. Will adjust for therapeutic dose        At risk for opportunistic infections  - Bactrim for PCP ppx.  - Valcyte for CMV ppx.      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 Replaced LHA, reconstructed.      Atrial fibrillation  -H/O A fib restarted Hhome metoprolol and amiodarone (holding Coumadin).   -Plan for Eliquis over wkd verses Monday.         Type 2 diabetes mellitus  - Endocrine following patient, appreciate rec's   - Monitor         VTE Risk Mitigation (From admission, onward)         Ordered     heparin (porcine) injection 5,000 Units  Every 8 hours         01/06/22 1314     IP VTE HIGH RISK PATIENT  Once         01/06/22 1314                The patients clinical status was discussed at multidisplinary rounds, involving transplant surgery, transplant medicine, pharmacy, nursing, nutrition, and social work    Discharge Planning:  No Patient Care Coordination Note on file.      GENE Hou  Liver Transplant  [facility]

## 2022-01-08 NOTE — PROGRESS NOTES
"[facility] - Transplant [facility]  Endocrinology  Progress Note    Admit Date: 2022     Reason for Consult: Management of T2DM, Hyperglycemia     Surgical Procedure and Date: Liver Transplant 2022    Diabetes diagnosis year:     Home Diabetes Medications:  per Dr. CINDA Argueta   Lantus 22 units HS  Metformin 1000 mg BID    How often checking glucose at home?  YUSUF (intubated)    BG readings on regimen: YUSUF  Hypoglycemia on the regimen? YUSUF  Missed doses on regimen? YUSUF    Diabetes Complications include:     Hyperglycemia and Diabetic peripheral neuropathy     Complicating diabetes co morbidities:   CIRRHOSIS and Active Cancer (skin), PAD, CASTILLO, HLD,       HPI:   Patient is a 66 y.o. male with a diagnosis of Afib (on coumadin), CASTILLO cirrhosis, and HCC s/p TACE and RFA who is listed for liver transplant with MELD 24. He now presents to the hospital for liver transplant on 2022. Endocrinology consulted to manage glycemic control s/p transplant surgery.     Lab Results   Component Value Date    HGBA1C 5.8 (H) 2022                 Interval HPI:   Overnight events: No acute events overnight. Patient on the TSU in room 95603/10242 A. Blood glucose stable. BG at goal on current insulin regimen. Steroid use- Solumedrol 160 mg. 2 Days Post-Op  Renal function- Abnormal - Creatinine 1.5  Vasopressors-  None      Diet diabetic [facility];  Calorie     Eatin%  Nausea: No  Hypoglycemia and intervention: No  Fever: No  TPN and/or TF: No      /73   Pulse 79   Temp 98.3 °F (36.8 °C)   Resp 18   Ht 6' 3" (1.905 m)   Wt 102.2 kg (225 lb 5 oz)   SpO2 (!) 93%   BMI 28.16 kg/m²     Labs Reviewed and Include    Recent Labs   Lab 22  0353   *   CALCIUM 8.7   ALBUMIN 3.7   PROT 5.9*      K 4.8   CO2 27   CL 97   BUN 36*   CREATININE 1.5*   ALKPHOS 64   *   *   BILITOT 1.4*     Lab Results   Component Value Date    WBC 15.60 (H) 2022    HGB 12.0 (L) " "01/08/2022    HCT 36.5 (L) 01/08/2022    MCV 90 01/08/2022    PLT 91 (L) 01/08/2022     No results for input(s): TSH, FREET4 in the last 168 hours.  Lab Results   Component Value Date    HGBA1C 5.8 (H) 01/05/2022       Nutritional status:   Body mass index is 28.16 kg/m².  Lab Results   Component Value Date    ALBUMIN 3.7 01/08/2022    ALBUMIN 3.6 01/07/2022    ALBUMIN 3.9 01/07/2022     No results found for: PREALBUMIN    Estimated Creatinine Clearance: 61.1 mL/min (A) (based on SCr of 1.5 mg/dL (H)).    Accu-Checks  Recent Labs     01/07/22  0204 01/07/22  0352 01/07/22  0454 01/07/22  0559 01/07/22  0817 01/07/22  1233 01/07/22  1726 01/07/22  2147 01/08/22  0158 01/08/22  0747   POCTGLUCOSE 165* 151* 149* 161* 159* 180* 153* 225* 199* 179*       Current Medications and/or Treatments Impacting Glycemic Control  Immunotherapy:    Immunosuppressants         Stop Route Frequency     tacrolimus capsule 0.5 mg         -- Oral 2 times daily     mycophenolate capsule 1,000 mg         -- Oral 2 times daily        Steroids:   Hormones (From admission, onward)            Start     Stop Route Frequency Ordered    01/12/22 0900  predniSONE tablet 20 mg  (methylprednisolone taper panel)        "Followed by" Linked Group Details    -- Oral Daily 01/06/22 1314    01/11/22 0900  methylPREDNISolone sodium succinate injection 40 mg  (methylprednisolone taper panel)        "Followed by" Linked Group Details    01/12 0859 IV Daily 01/06/22 1314    01/10/22 0900  methylPREDNISolone sodium succinate injection 80 mg  (methylprednisolone taper panel)        "Followed by" Linked Group Details    01/11 0859 IV Daily 01/06/22 1314    01/09/22 0900  methylPREDNISolone sodium succinate injection 120 mg  (methylprednisolone taper panel)        "Followed by" Linked Group Details    01/10 0859 IV Daily 01/06/22 1314        Pressors:    Autonomic Drugs (From admission, onward)            None        Hyperglycemia/Diabetes Medications: "   Antihyperglycemics (From admission, onward)            Start     Stop Route Frequency Ordered    01/08/22 1100  insulin detemir U-100 pen 28 Units         -- SubQ Daily 01/08/22 0947    01/08/22 1047  insulin aspart U-100 pen 1-10 Units         -- SubQ Before meals & nightly PRN 01/08/22 0947          ASSESSMENT and PLAN    * End stage liver disease  Managed per primary team  Avoid hypoglycemia        Type 2 diabetes mellitus  BG goal 140 - 180     - D/C transition drip at 1.2 units/hr (based on steady state while on IIP).  - Start Levemir 28 units qd (based on insulin needs while on the drip).   - Start Novolog MDC (150/25) prn for BG excursions.   - BG checks AC/HS    ** Please call Endocrine for any BG related issues **  ** Please notify Endocrine for any change and/or advance in diet**  Lab Results   Component Value Date    HGBA1C 5.8 (H) 01/05/2022       Discharge planning: TBD          S/P liver transplant  Managed per primary team  Avoid hypoglycemia        Adverse effect of adrenal cortical steroids, sequela  On steroid therapy per transplant team; may elevate BG readings        Surgical wound present  Optimize BG control to improve wound healing             João eBnder, DNP, FNP  Endocrinology  [facility]

## 2022-01-08 NOTE — SUBJECTIVE & OBJECTIVE
Scheduled Meds:   amiodarone  200 mg Oral Daily    docusate sodium  100 mg Oral TID    famotidine  20 mg Oral Nightly    heparin (porcine)  5,000 Units Subcutaneous Q8H    insulin detemir U-100  28 Units Subcutaneous Daily    LIDOcaine HCL 10 mg/ml (1%)  5 mL Other Once    [START ON 1/9/2022] methylPREDNISolone sodium succinate injection  120 mg Intravenous Daily    Followed by    [START ON 1/10/2022] methylPREDNISolone sodium succinate injection  80 mg Intravenous Daily    Followed by    [START ON 1/11/2022] methylPREDNISolone sodium succinate injection  40 mg Intravenous Daily    Followed by    [START ON 1/12/2022] predniSONE  20 mg Oral Daily    metoprolol succinate  50 mg Oral Daily    mupirocin  1 g Nasal BID    mycophenolate  1,000 mg Oral BID    nystatin  500,000 Units Mouth/Throat TID PC    sertraline  25 mg Oral Daily    [START ON 1/13/2022] sulfamethoxazole-trimethoprim 400-80mg  1 tablet Oral Daily AM    [START ON 1/9/2022] tacrolimus  0.5 mg Oral BID    tamsulosin  0.4 mg Oral QHS    [START ON 1/16/2022] valGANciclovir  450 mg Oral Daily     Continuous Infusions:  PRN Meds:dextrose 50%, dextrose 50%, gabapentin, glucagon (human recombinant), glucose, glucose, insulin aspart U-100, oxyCODONE, oxyCODONE, sodium chloride 0.9%    Review of Systems   Constitutional: Positive for activity change and appetite change.   Eyes: Negative for visual disturbance.   Respiratory: Negative for cough and shortness of breath.    Cardiovascular: Negative for chest pain, palpitations and leg swelling.   Gastrointestinal: Positive for abdominal pain. Negative for abdominal distention, constipation, diarrhea, nausea and vomiting.   Genitourinary: Negative for difficulty urinating.   Musculoskeletal: Negative for arthralgias.   Skin: Negative for wound.   Allergic/Immunologic: Positive for immunocompromised state.   Neurological: Negative for dizziness.   Hematological: Negative for adenopathy. Does not  bruise/bleed easily.   Psychiatric/Behavioral: Negative for agitation.     Objective:     Vital Signs (Most Recent):  Temp: 98.3 °F (36.8 °C) (01/08/22 0845)  Pulse: 79 (01/08/22 0845)  Resp: 18 (01/08/22 1120)  BP: 138/73 (01/08/22 0930)  SpO2: (!) 93 % (01/08/22 0845) Vital Signs (24h Range):  Temp:  [98 °F (36.7 °C)-98.9 °F (37.2 °C)] 98.3 °F (36.8 °C)  Pulse:  [60-79] 79  Resp:  [18-24] 18  SpO2:  [92 %-94 %] 93 %  BP: (104-138)/(57-73) 138/73     Weight: 102.2 kg (225 lb 5 oz)  Body mass index is 28.16 kg/m².    Intake/Output - Last 3 Shifts       01/06 0700  01/07 0659 01/07 0700 01/08 0659 01/08 0700 01/09 0659    P.O.  840     I.V. (mL/kg) 6494.7 (63.3) 23.9 (0.2)     Blood 1288      IV Piggyback 805.1 89.1     Total Intake(mL/kg) 8587.7 (83.7) 953 (9.3)     Urine (mL/kg/hr) 5400 (2.2) 1250 (0.5) 150 (0.2)    Drains 400 200 70    Stool  0 0    Blood 2000      Total Output 7800 1450 220    Net +787.7 -497 -220           Stool Occurrence  0 x 0 x          Physical Exam  Vitals and nursing note reviewed.   Constitutional:       Appearance: He is well-developed and well-nourished.   HENT:      Head: Normocephalic.   Eyes:      Conjunctiva/sclera: Conjunctivae normal.   Cardiovascular:      Rate and Rhythm: Normal rate and regular rhythm.      Heart sounds: No murmur heard.      Pulmonary:      Effort: Pulmonary effort is normal.      Breath sounds: Normal breath sounds.   Abdominal:      General: Bowel sounds are normal. There is no distension.      Palpations: Abdomen is soft.      Tenderness: There is no abdominal tenderness.          Comments: Staples CDI    Musculoskeletal:         General: Normal range of motion.      Cervical back: Normal range of motion.   Skin:     General: Skin is warm and dry.      Capillary Refill: Capillary refill takes less than 2 seconds.   Neurological:      Mental Status: He is alert and oriented to person, place, and time.   Psychiatric:         Mood and Affect: Mood and  affect normal.         Behavior: Behavior normal.         Thought Content: Thought content normal.         Judgment: Judgment normal.         Laboratory:  Immunosuppressants         Stop Route Frequency     tacrolimus capsule 0.5 mg         -- Oral 2 times daily     mycophenolate capsule 1,000 mg         -- Oral 2 times daily        CBC:   Recent Labs   Lab 01/08/22  0353   WBC 15.60*   RBC 4.04*   HGB 12.0*   HCT 36.5*   PLT 91*   MCV 90   MCH 29.7   MCHC 32.9     BMP:   Recent Labs   Lab 01/08/22  0353   *      K 4.8   CL 97   CO2 27   BUN 36*   CREATININE 1.5*   CALCIUM 8.7     CMP:   Recent Labs   Lab 01/08/22  0353   *   CALCIUM 8.7   ALBUMIN 3.7   PROT 5.9*      K 4.8   CO2 27   CL 97   BUN 36*   CREATININE 1.5*   ALKPHOS 64   *   *   BILITOT 1.4*     Coagulation:   Recent Labs   Lab 01/08/22 0353   INR 1.1   APTT 24.8     Labs within the past 24 hours have been reviewed.    Diagnostic Results:  US Liver Transplant Post:  Results for orders placed during the hospital encounter of 01/05/22    US Doppler Liver Transplant Post (xpd)    Narrative  EXAMINATION:  US DOPPLER LIVER TRANSPLANT POST (XPD)    CLINICAL HISTORY:  Assess perfusion following liver transplant;    TECHNIQUE:  Limited abdominal ultrasound of the transplant liver with Doppler evaluation.  Color and spectral Doppler were performed.    COMPARISON:  None.    FINDINGS:  Patient is status post orthotopic liver transplant 01/06/2022.  Liver allograft is normal in size.  The liver demonstrates homogeneous echotexture.  No focal hepatic lesions are seen.  No fluid collections.    The common duct is not dilated, measuring 3 mm.  No dilated intrahepatic radicles are seen.    Color flow and spectral waveform analysis was performed.  The main portal vein, right portal vein, left portal vein, middle hepatic vein, right hepatic vein, left hepatic vein, and IVC are patent with proper directional flow.    Anastomosis  site of the main hepatic artery demonstrates a peak systolic velocity 103 cm/sec.    Main hepatic artery demonstrates resistive index 0.82 with normal waveform.    Left hepatic artery shows resistive index 0.71 with normal waveform.    Anterior branch of the right hepatic artery demonstrates resistive index 0.76 with normal waveform.    Posterior branch of the right hepatic artery demonstrates resistive index 0.81 with normal waveform.    Impression  Satisfactory Doppler evaluation of liver allograft.    Findings were discussed with Dr. Munoz by Dr. Alvarenga at approximately 08:10 a.m. on 01/07/2022.    Electronically signed by resident: David Meléndez  Date:    01/07/2022  Time:    06:28    Electronically signed by: Kris Alvarenga MD  Date:    01/07/2022  Time:    08:12    Debility/Functional status: Patient debilitated by evidence of Weakness. Physical and occupational therapy ordered daily to evaluate and treat. Debility was: present on admission.

## 2022-01-08 NOTE — HOSPITAL COURSE
"Stepdown to TSU on 1/7 Mr Purdy is now S/p DBD LT 1/6 for ESLD 2/2 CASTILLO/HCC. Replaced LHA, reconstructed. POD#1 and POD#5 liver US satisfactory. H/O A fib resumed home metoprolol and amiodarone, held Warfarin. Stepped down to TSU 1/7. On 1/8, patient converted to A-fib on 1/8 heart rates from 90's 140's with ambulation. Cardiology consulted.Followed recs and increased Toprol to 50 BID 1/9 then 100 mg daily 1/10. Cont Amio at current dose (200 mg daily). Rate better controlled 1/10. No plan for cardioversion at this time, per cards. Resumed Eliquis 1/11. Sinus cornel (40s) 1/12 with lightheadedness. Hydrated with albumin and decreased Toprol dose to 50 mg daily with hold parameters. Pt not feeling well 1/11 - c/o abdominal pain/distention and indigestion. +flatus, no BM. Started Protonix, adjusted bowel regimen, added suppository. Patient was able to have a BM with suppository.    Interval History: Pt reports feeling "lousy" this AM primarily due to heartburn and abdominal pain. He was able to have a BM  yesterday after suppository, but will repeat suppository this AM in case constipation is contributing. Continue Protonix and PRN Tums/Maalox for heartburn and keep head of bed elevated. Pt remains bradycardic (sinus) in 40s with occasionally lightheadedness with standing. He did not receive his Toprol this AM due to hold parameters. Spoke with cardiology who recommended discontinuing all beta blockers for now (done). Pt slightly hypertensive, may need non BB anti-hypertensive added if remains so. LFTs trending down. Hope to be able to dc patient tomorrow. Monitor.  "

## 2022-01-08 NOTE — PROGRESS NOTES
Patient noted to be in Afib 100-110's.  Notified Karmen Blum NP and obtained order for EKG.  Additional 25mg Lopressor administered.  Will continue to monitor patient.

## 2022-01-08 NOTE — ASSESSMENT & PLAN NOTE
- continue prograf  - continue to monitor for toxic side effects and check daily levels. Will adjust for therapeutic dose

## 2022-01-08 NOTE — PROGRESS NOTES
Patient being assisted to restroom.  Patient reports about half way there that he was feeling lightheaded.  Patient noted on telemetry 140's Afib.  Patient began to lean backwards and stated dizziness.  Significant other able to roll chair behind patient and sit patient.  Patient BP lying back in 130's/70's.  Sitting forward in chair noted 89/60.  BG checked and noted at 255.  Patient O2 sats > 92% on room air.  Karmen Blum NP notified.  Patient also noted to only have about 250cc urine output for the day and low oral intake.  Patient assisted back into bed.  HR fluctuating in 100's-120's.  BP now 120's/70's.  Patient states he waould like to rest.  2L nasal cannula O2 placed for comfort and sats = 94%.  Will continue to monitor patient.

## 2022-01-08 NOTE — ASSESSMENT & PLAN NOTE
BG goal 140 - 180     - D/C transition drip at 1.2 units/hr (based on steady state while on IIP).  - Start Levemir 28 units qd (based on insulin needs while on the drip).   - Start Novolog MDC (150/25) prn for BG excursions.   - BG checks AC/HS    ** Please call Endocrine for any BG related issues **  ** Please notify Endocrine for any change and/or advance in diet**  Lab Results   Component Value Date    HGBA1C 5.8 (H) 01/05/2022       Discharge planning: TBD

## 2022-01-08 NOTE — PROGRESS NOTES
Insulin drip discontinued and Levamir administered.  Patient eating approximately 25% of meals.  Patient belching but not passing gas and noted hypoactive bowel sounds.  Patient in afib at about 1300 today.  Patient asymptomatic at rest but noted to get hypotensive and lightheaded with ambulation.  Patient assisted into chair when episode occurred and patient verbalizes understand ing that he is not to get OOB without assistance.  Extra dose of Metaprolol added and labs drawn.  Reviewed self medications with patient and patient able to correctly state what medications to take and proper amount.  Patient reports pain 9/10 with ambulation and states moderate relief with 10mg Oxycodone.  Incision painted with betadine and educated patient's significant other on incision care.  DANNIELLE drain with 70cc of serous output.  Drain removal attempted, but drain left in place.  Patient voiding only 250cc for 8 hours and encouraged to increase oral intake.  Central line dressing changed.  No s/s of infection noted and patient remains afebrile.  Patient sat up in chair for 4 hours today.  No skin breakdown noted to buttocks or heels.

## 2022-01-08 NOTE — PLAN OF CARE
Pt aao x 4. VSS.Bed in low locked pos, call light within reach. Pt calls for assistance when needed. Insulin gtt cont as ordered. Ted carmona with staples, DANNIELLE x 1 - serosanguinous drainage. Pain controlled with prn meds. AM labs ordered.

## 2022-01-08 NOTE — PLAN OF CARE
Patient remaining free from injury and ambulating with standby assist.  Patient has central line CDI and will change dressing tomorrow.  Patient incision CDI.  Patient liver enzymes trending down.  Patient BG being monitored and < 180.  Diet advanced and patient tolerating well.  Plan to transition to Levamir tomorrow.  Patient reports pain well controlled with pain medication and when at rest.  Patient worked with PT.  Began reviewing medications with patient.  Patient bradycardic in 50's when resting.  No afib noted at this time.  No skin breakdown.  DANNIELLE drain with small amount of serosang drainage.  Patient voided after irwin removed.

## 2022-01-09 LAB
ABO + RH BLD: NORMAL
ALBUMIN SERPL BCP-MCNC: 3.4 G/DL (ref 3.5–5.2)
ALP SERPL-CCNC: 67 U/L (ref 55–135)
ALT SERPL W/O P-5'-P-CCNC: 364 U/L (ref 10–44)
ANION GAP SERPL CALC-SCNC: 11 MMOL/L (ref 8–16)
APTT BLDCRRT: 25.4 SEC (ref 21–32)
AST SERPL-CCNC: 159 U/L (ref 10–40)
BASOPHILS # BLD AUTO: 0.02 K/UL (ref 0–0.2)
BASOPHILS NFR BLD: 0.1 % (ref 0–1.9)
BILIRUB SERPL-MCNC: 1 MG/DL (ref 0.1–1)
BLD GP AB SCN CELLS X3 SERPL QL: NORMAL
BLD PROD TYP BPU: NORMAL
BLOOD UNIT EXPIRATION DATE: NORMAL
BLOOD UNIT TYPE CODE: 5100
BLOOD UNIT TYPE: NORMAL
BUN SERPL-MCNC: 52 MG/DL (ref 8–23)
CALCIUM SERPL-MCNC: 8.4 MG/DL (ref 8.7–10.5)
CHLORIDE SERPL-SCNC: 94 MMOL/L (ref 95–110)
CO2 SERPL-SCNC: 27 MMOL/L (ref 23–29)
CODING SYSTEM: NORMAL
CREAT SERPL-MCNC: 1.4 MG/DL (ref 0.5–1.4)
DIFFERENTIAL METHOD: ABNORMAL
DISPENSE STATUS: NORMAL
EOSINOPHIL # BLD AUTO: 0 K/UL (ref 0–0.5)
EOSINOPHIL NFR BLD: 0 % (ref 0–8)
ERYTHROCYTE [DISTWIDTH] IN BLOOD BY AUTOMATED COUNT: 13.9 % (ref 11.5–14.5)
EST. GFR  (AFRICAN AMERICAN): 59.3 ML/MIN/1.73 M^2
EST. GFR  (NON AFRICAN AMERICAN): 51.3 ML/MIN/1.73 M^2
GLUCOSE SERPL-MCNC: 214 MG/DL (ref 70–110)
HCT VFR BLD AUTO: 36.8 % (ref 40–54)
HGB BLD-MCNC: 12.2 G/DL (ref 14–18)
IMM GRANULOCYTES # BLD AUTO: 0.5 K/UL (ref 0–0.04)
IMM GRANULOCYTES NFR BLD AUTO: 2.8 % (ref 0–0.5)
INR PPP: 1.1 (ref 0.8–1.2)
LYMPHOCYTES # BLD AUTO: 0.4 K/UL (ref 1–4.8)
LYMPHOCYTES NFR BLD: 2.5 % (ref 18–48)
MAGNESIUM SERPL-MCNC: 2.3 MG/DL (ref 1.6–2.6)
MAGNESIUM SERPL-MCNC: 2.3 MG/DL (ref 1.6–2.6)
MCH RBC QN AUTO: 29.5 PG (ref 27–31)
MCHC RBC AUTO-ENTMCNC: 33.2 G/DL (ref 32–36)
MCV RBC AUTO: 89 FL (ref 82–98)
MONOCYTES # BLD AUTO: 1.2 K/UL (ref 0.3–1)
MONOCYTES NFR BLD: 6.5 % (ref 4–15)
NEUTROPHILS # BLD AUTO: 15.8 K/UL (ref 1.8–7.7)
NEUTROPHILS NFR BLD: 88.1 % (ref 38–73)
NRBC BLD-RTO: 0 /100 WBC
PLATELET # BLD AUTO: 135 K/UL (ref 150–450)
PMV BLD AUTO: 11.6 FL (ref 9.2–12.9)
POCT GLUCOSE: 181 MG/DL (ref 70–110)
POCT GLUCOSE: 182 MG/DL (ref 70–110)
POCT GLUCOSE: 248 MG/DL (ref 70–110)
POCT GLUCOSE: 331 MG/DL (ref 70–110)
POTASSIUM SERPL-SCNC: 4.5 MMOL/L (ref 3.5–5.1)
PROT SERPL-MCNC: 5.7 G/DL (ref 6–8.4)
PROTHROMBIN TIME: 11.7 SEC (ref 9–12.5)
RBC # BLD AUTO: 4.13 M/UL (ref 4.6–6.2)
SODIUM SERPL-SCNC: 132 MMOL/L (ref 136–145)
TACROLIMUS BLD-MCNC: 6.8 NG/ML (ref 5–15)
TRANS ERYTHROCYTES VOL PATIENT: NORMAL ML
WBC # BLD AUTO: 17.93 K/UL (ref 3.9–12.7)

## 2022-01-09 PROCEDURE — 25000003 PHARM REV CODE 250: Performed by: STUDENT IN AN ORGANIZED HEALTH CARE EDUCATION/TRAINING PROGRAM

## 2022-01-09 PROCEDURE — 99232 SBSQ HOSP IP/OBS MODERATE 35: CPT | Mod: ,,, | Performed by: NURSE PRACTITIONER

## 2022-01-09 PROCEDURE — 85730 THROMBOPLASTIN TIME PARTIAL: CPT | Performed by: STUDENT IN AN ORGANIZED HEALTH CARE EDUCATION/TRAINING PROGRAM

## 2022-01-09 PROCEDURE — 63600175 PHARM REV CODE 636 W HCPCS: Performed by: STUDENT IN AN ORGANIZED HEALTH CARE EDUCATION/TRAINING PROGRAM

## 2022-01-09 PROCEDURE — 63600175 PHARM REV CODE 636 W HCPCS: Performed by: NURSE PRACTITIONER

## 2022-01-09 PROCEDURE — 94761 N-INVAS EAR/PLS OXIMETRY MLT: CPT

## 2022-01-09 PROCEDURE — 80053 COMPREHEN METABOLIC PANEL: CPT | Performed by: STUDENT IN AN ORGANIZED HEALTH CARE EDUCATION/TRAINING PROGRAM

## 2022-01-09 PROCEDURE — 85025 COMPLETE CBC W/AUTO DIFF WBC: CPT | Performed by: STUDENT IN AN ORGANIZED HEALTH CARE EDUCATION/TRAINING PROGRAM

## 2022-01-09 PROCEDURE — 83735 ASSAY OF MAGNESIUM: CPT | Mod: 91 | Performed by: STUDENT IN AN ORGANIZED HEALTH CARE EDUCATION/TRAINING PROGRAM

## 2022-01-09 PROCEDURE — 99223 PR INITIAL HOSPITAL CARE,LEVL III: ICD-10-PCS | Mod: ,,, | Performed by: INTERNAL MEDICINE

## 2022-01-09 PROCEDURE — 85610 PROTHROMBIN TIME: CPT | Performed by: STUDENT IN AN ORGANIZED HEALTH CARE EDUCATION/TRAINING PROGRAM

## 2022-01-09 PROCEDURE — 99232 PR SUBSEQUENT HOSPITAL CARE,LEVL II: ICD-10-PCS | Mod: ,,, | Performed by: NURSE PRACTITIONER

## 2022-01-09 PROCEDURE — 99900035 HC TECH TIME PER 15 MIN (STAT)

## 2022-01-09 PROCEDURE — 25000003 PHARM REV CODE 250: Performed by: NURSE PRACTITIONER

## 2022-01-09 PROCEDURE — 80197 ASSAY OF TACROLIMUS: CPT | Performed by: STUDENT IN AN ORGANIZED HEALTH CARE EDUCATION/TRAINING PROGRAM

## 2022-01-09 PROCEDURE — 99223 1ST HOSP IP/OBS HIGH 75: CPT | Mod: ,,, | Performed by: INTERNAL MEDICINE

## 2022-01-09 PROCEDURE — 83735 ASSAY OF MAGNESIUM: CPT | Performed by: NURSE PRACTITIONER

## 2022-01-09 PROCEDURE — 20600001 HC STEP DOWN PRIVATE ROOM

## 2022-01-09 RX ORDER — METOPROLOL SUCCINATE 50 MG/1
50 TABLET, EXTENDED RELEASE ORAL 2 TIMES DAILY
Status: DISCONTINUED | OUTPATIENT
Start: 2022-01-09 | End: 2022-01-09

## 2022-01-09 RX ORDER — METOPROLOL SUCCINATE 50 MG/1
100 TABLET, EXTENDED RELEASE ORAL DAILY
Status: DISCONTINUED | OUTPATIENT
Start: 2022-01-10 | End: 2022-01-11

## 2022-01-09 RX ORDER — FUROSEMIDE 10 MG/ML
20 INJECTION INTRAMUSCULAR; INTRAVENOUS ONCE
Status: COMPLETED | OUTPATIENT
Start: 2022-01-09 | End: 2022-01-09

## 2022-01-09 RX ORDER — FUROSEMIDE 10 MG/ML
20 INJECTION INTRAMUSCULAR; INTRAVENOUS ONCE
Status: CANCELLED | OUTPATIENT
Start: 2022-01-09

## 2022-01-09 RX ORDER — INSULIN ASPART 100 [IU]/ML
4-8 INJECTION, SOLUTION INTRAVENOUS; SUBCUTANEOUS
Status: DISCONTINUED | OUTPATIENT
Start: 2022-01-09 | End: 2022-01-13 | Stop reason: HOSPADM

## 2022-01-09 RX ORDER — METOPROLOL SUCCINATE 50 MG/1
50 TABLET, EXTENDED RELEASE ORAL 2 TIMES DAILY
Status: COMPLETED | OUTPATIENT
Start: 2022-01-09 | End: 2022-01-09

## 2022-01-09 RX ADMIN — OXYCODONE HYDROCHLORIDE 10 MG: 10 TABLET ORAL at 04:01

## 2022-01-09 RX ADMIN — OXYCODONE HYDROCHLORIDE 10 MG: 10 TABLET ORAL at 03:01

## 2022-01-09 RX ADMIN — INSULIN ASPART 2 UNITS: 100 INJECTION, SOLUTION INTRAVENOUS; SUBCUTANEOUS at 05:01

## 2022-01-09 RX ADMIN — MYCOPHENOLATE MOFETIL 1000 MG: 250 CAPSULE ORAL at 08:01

## 2022-01-09 RX ADMIN — MUPIROCIN 1 G: 20 OINTMENT TOPICAL at 08:01

## 2022-01-09 RX ADMIN — NYSTATIN 500000 UNITS: 500000 SUSPENSION ORAL at 08:01

## 2022-01-09 RX ADMIN — TACROLIMUS 0.5 MG: 0.5 CAPSULE ORAL at 08:01

## 2022-01-09 RX ADMIN — TAMSULOSIN HYDROCHLORIDE 0.4 MG: 0.4 CAPSULE ORAL at 08:01

## 2022-01-09 RX ADMIN — HEPARIN SODIUM 5000 UNITS: 5000 INJECTION INTRAVENOUS; SUBCUTANEOUS at 01:01

## 2022-01-09 RX ADMIN — DOCUSATE SODIUM 100 MG: 100 CAPSULE ORAL at 08:01

## 2022-01-09 RX ADMIN — HEPARIN SODIUM 5000 UNITS: 5000 INJECTION INTRAVENOUS; SUBCUTANEOUS at 08:01

## 2022-01-09 RX ADMIN — HEPARIN SODIUM 5000 UNITS: 5000 INJECTION INTRAVENOUS; SUBCUTANEOUS at 05:01

## 2022-01-09 RX ADMIN — NYSTATIN 500000 UNITS: 500000 SUSPENSION ORAL at 05:01

## 2022-01-09 RX ADMIN — FUROSEMIDE 20 MG: 10 INJECTION, SOLUTION INTRAVENOUS at 11:01

## 2022-01-09 RX ADMIN — NYSTATIN 500000 UNITS: 500000 SUSPENSION ORAL at 01:01

## 2022-01-09 RX ADMIN — AMIODARONE HYDROCHLORIDE 200 MG: 200 TABLET ORAL at 08:01

## 2022-01-09 RX ADMIN — INSULIN ASPART 8 UNITS: 100 INJECTION, SOLUTION INTRAVENOUS; SUBCUTANEOUS at 12:01

## 2022-01-09 RX ADMIN — INSULIN ASPART 4 UNITS: 100 INJECTION, SOLUTION INTRAVENOUS; SUBCUTANEOUS at 12:01

## 2022-01-09 RX ADMIN — OXYCODONE HYDROCHLORIDE 10 MG: 10 TABLET ORAL at 08:01

## 2022-01-09 RX ADMIN — SERTRALINE HYDROCHLORIDE 25 MG: 25 TABLET ORAL at 08:01

## 2022-01-09 RX ADMIN — METHYLPREDNISOLONE SODIUM SUCCINATE 120 MG: 40 INJECTION, POWDER, FOR SOLUTION INTRAMUSCULAR; INTRAVENOUS at 08:01

## 2022-01-09 RX ADMIN — FAMOTIDINE 20 MG: 20 TABLET ORAL at 08:01

## 2022-01-09 RX ADMIN — DOCUSATE SODIUM 100 MG: 100 CAPSULE ORAL at 01:01

## 2022-01-09 RX ADMIN — METOPROLOL SUCCINATE 50 MG: 50 TABLET, EXTENDED RELEASE ORAL at 08:01

## 2022-01-09 RX ADMIN — INSULIN ASPART 4 UNITS: 100 INJECTION, SOLUTION INTRAVENOUS; SUBCUTANEOUS at 05:01

## 2022-01-09 RX ADMIN — INSULIN DETEMIR 28 UNITS: 100 INJECTION, SOLUTION SUBCUTANEOUS at 08:01

## 2022-01-09 RX ADMIN — INSULIN ASPART 4 UNITS: 100 INJECTION, SOLUTION INTRAVENOUS; SUBCUTANEOUS at 08:01

## 2022-01-09 RX ADMIN — TACROLIMUS 0.5 MG: 0.5 CAPSULE ORAL at 05:01

## 2022-01-09 NOTE — PROGRESS NOTES
"[facility] - [facility]  Endocrinology  Progress Note    Admit Date: 2022     Reason for Consult: Management of T2DM, Hyperglycemia     Surgical Procedure and Date: Liver Transplant 2022    Diabetes diagnosis year:     Home Diabetes Medications:  per Dr. CINDA Argueta   Lantus 22 units HS  Metformin 1000 mg BID    How often checking glucose at home?  YUSUF (intubated)    BG readings on regimen: YUSUF  Hypoglycemia on the regimen? YUSUF  Missed doses on regimen? YUSUF    Diabetes Complications include:     Hyperglycemia and Diabetic peripheral neuropathy     Complicating diabetes co morbidities:   CIRRHOSIS and Active Cancer (skin), PAD, CASTILLO, HLD,       HPI:   Patient is a 66 y.o. male with a diagnosis of Afib (on coumadin), CASTILLO cirrhosis, and HCC s/p TACE and RFA who is listed for liver transplant with MELD 24. He now presents to the hospital for liver transplant on 2022. Endocrinology consulted to manage glycemic control s/p transplant surgery.     Lab Results   Component Value Date    HGBA1C 5.8 (H) 2022                 Interval HPI:   Overnight events: No acute events overnight. Patient on the TSU in room 54964/37821 A. Blood glucose worsening. BG above goal on current insulin regimen. Steroid use- Solumedrol 120 mg. 3 Days Post-Op  Renal function- Normal  Vasopressors-  None      Diet diabetic [facility];  Calorie     Eatin%  Nausea: No  Hypoglycemia and intervention: No  Fever: No  TPN and/or TF: No    /71 (BP Location: Left arm, Patient Position: Sitting)   Pulse 100   Temp 98 °F (36.7 °C) (Oral)   Resp 18   Ht 6' 3" (1.905 m)   Wt 103.7 kg (228 lb 9.9 oz)   SpO2 95%   BMI 28.58 kg/m²     Labs Reviewed and Include    Recent Labs   Lab 22  0524   *   CALCIUM 8.4*   ALBUMIN 3.4*   PROT 5.7*   *   K 4.5   CO2 27   CL 94*   BUN 52*   CREATININE 1.4   ALKPHOS 67   *   *   BILITOT 1.0     Lab Results   Component Value Date    WBC " "17.93 (H) 01/09/2022    HGB 12.2 (L) 01/09/2022    HCT 36.8 (L) 01/09/2022    MCV 89 01/09/2022     (L) 01/09/2022     No results for input(s): TSH, FREET4 in the last 168 hours.  Lab Results   Component Value Date    HGBA1C 5.8 (H) 01/05/2022       Nutritional status:   Body mass index is 28.58 kg/m².  Lab Results   Component Value Date    ALBUMIN 3.4 (L) 01/09/2022    ALBUMIN 3.4 (L) 01/08/2022    ALBUMIN 3.7 01/08/2022     No results found for: PREALBUMIN    Estimated Creatinine Clearance: 65.9 mL/min (based on SCr of 1.4 mg/dL).    Accu-Checks  Recent Labs     01/07/22  0817 01/07/22  1233 01/07/22  1726 01/07/22  2147 01/08/22  0158 01/08/22  0747 01/08/22  1201 01/08/22  1523 01/08/22  2029 01/09/22  0756   POCTGLUCOSE 159* 180* 153* 225* 199* 179* 215* 255* 241* 248*       Current Medications and/or Treatments Impacting Glycemic Control  Immunotherapy:    Immunosuppressants         Stop Route Frequency     tacrolimus capsule 0.5 mg         -- Oral 2 times daily     mycophenolate capsule 1,000 mg         -- Oral 2 times daily        Steroids:   Hormones (From admission, onward)            Start     Stop Route Frequency Ordered    01/12/22 0900  predniSONE tablet 20 mg  (methylprednisolone taper panel)        "Followed by" Linked Group Details    -- Oral Daily 01/06/22 1314    01/11/22 0900  methylPREDNISolone sodium succinate injection 40 mg  (methylprednisolone taper panel)        "Followed by" Linked Group Details    01/12 0859 IV Daily 01/06/22 1314    01/10/22 0900  methylPREDNISolone sodium succinate injection 80 mg  (methylprednisolone taper panel)        "Followed by" Linked Group Details    01/11 0859 IV Daily 01/06/22 1314        Pressors:    Autonomic Drugs (From admission, onward)            None        Hyperglycemia/Diabetes Medications:   Antihyperglycemics (From admission, onward)            Start     Stop Route Frequency Ordered    01/09/22 1130  insulin aspart U-100 pen 4-8 Units         " -- SubQ 3 times daily with meals 01/09/22 0812    01/08/22 1100  insulin detemir U-100 pen 28 Units         -- SubQ Daily 01/08/22 0947    01/08/22 1047  insulin aspart U-100 pen 1-10 Units         -- SubQ Before meals & nightly PRN 01/08/22 0947          ASSESSMENT and PLAN    Type 2 diabetes mellitus  BG goal 140 - 180     - Levemir 28 units qd  - Start Novolog 4-8 TIDWM (Weight-based at 0.5 units/kg/day; started due prandial excursions).   - Start Novolog MDC (150/25) prn for BG excursions.   - BG checks AC/HS    ** Please call Endocrine for any BG related issues **  ** Please notify Endocrine for any change and/or advance in diet**  Lab Results   Component Value Date    HGBA1C 5.8 (H) 01/05/2022       Discharge planning: TBD          S/P liver transplant  Managed per primary team  Avoid hypoglycemia        Adverse effect of adrenal cortical steroids, sequela  On steroid therapy per transplant team; may elevate BG readings             João Bender, DNP, FNP  Endocrinology  [facility] - [facility]

## 2022-01-09 NOTE — SUBJECTIVE & OBJECTIVE
"Interval HPI:   Overnight events: No acute events overnight. Patient on the TSU in room 86947/59970 A. Blood glucose worsening. BG above goal on current insulin regimen. Steroid use- Solumedrol 120 mg. 3 Days Post-Op  Renal function- Normal  Vasopressors-  None      Diet diabetic [facility];  Calorie     Eatin%  Nausea: No  Hypoglycemia and intervention: No  Fever: No  TPN and/or TF: No    /71 (BP Location: Left arm, Patient Position: Sitting)   Pulse 100   Temp 98 °F (36.7 °C) (Oral)   Resp 18   Ht 6' 3" (1.905 m)   Wt 103.7 kg (228 lb 9.9 oz)   SpO2 95%   BMI 28.58 kg/m²     Labs Reviewed and Include    Recent Labs   Lab 22  0524   *   CALCIUM 8.4*   ALBUMIN 3.4*   PROT 5.7*   *   K 4.5   CO2 27   CL 94*   BUN 52*   CREATININE 1.4   ALKPHOS 67   *   *   BILITOT 1.0     Lab Results   Component Value Date    WBC 17.93 (H) 2022    HGB 12.2 (L) 2022    HCT 36.8 (L) 2022    MCV 89 2022     (L) 2022     No results for input(s): TSH, FREET4 in the last 168 hours.  Lab Results   Component Value Date    HGBA1C 5.8 (H) 2022       Nutritional status:   Body mass index is 28.58 kg/m².  Lab Results   Component Value Date    ALBUMIN 3.4 (L) 2022    ALBUMIN 3.4 (L) 2022    ALBUMIN 3.7 2022     No results found for: PREALBUMIN    Estimated Creatinine Clearance: 65.9 mL/min (based on SCr of 1.4 mg/dL).    Accu-Checks  Recent Labs     22  0817 22  1233 22  1726 22  2147 22  0158 22  0747 22  1201 22  1523 22  0756   POCTGLUCOSE 159* 180* 153* 225* 199* 179* 215* 255* 241* 248*       Current Medications and/or Treatments Impacting Glycemic Control  Immunotherapy:    Immunosuppressants         Stop Route Frequency     tacrolimus capsule 0.5 mg         -- Oral 2 times daily     mycophenolate capsule 1,000 mg         -- Oral 2 times daily    " "    Steroids:   Hormones (From admission, onward)            Start     Stop Route Frequency Ordered    01/12/22 0900  predniSONE tablet 20 mg  (methylprednisolone taper panel)        "Followed by" Linked Group Details    -- Oral Daily 01/06/22 1314    01/11/22 0900  methylPREDNISolone sodium succinate injection 40 mg  (methylprednisolone taper panel)        "Followed by" Linked Group Details    01/12 0859 IV Daily 01/06/22 1314    01/10/22 0900  methylPREDNISolone sodium succinate injection 80 mg  (methylprednisolone taper panel)        "Followed by" Linked Group Details    01/11 0859 IV Daily 01/06/22 1314        Pressors:    Autonomic Drugs (From admission, onward)            None        Hyperglycemia/Diabetes Medications:   Antihyperglycemics (From admission, onward)            Start     Stop Route Frequency Ordered    01/09/22 1130  insulin aspart U-100 pen 4-8 Units         -- SubQ 3 times daily with meals 01/09/22 0812    01/08/22 1100  insulin detemir U-100 pen 28 Units         -- SubQ Daily 01/08/22 0947    01/08/22 1047  insulin aspart U-100 pen 1-10 Units         -- SubQ Before meals & nightly PRN 01/08/22 0947        "

## 2022-01-09 NOTE — PLAN OF CARE
Patient is AAOx4.  Afebrile  RA  AC&HS glucose monitoring.   Telemetry monitoring in place.  RIJ trialysis CDI.   RLQ DANNIELLE drain removed inadvertently per patient. Site leaking. Abdominal pad applied.  Chevron incision IVANIA with staples, patient applied betadine to incision per self med.   Importance of pulling self medications reinforced.   Instructed to call for assistance.   Nonskid socks when oob.   Bed is in lowest position with wheels locked.   [facility]

## 2022-01-09 NOTE — ASSESSMENT & PLAN NOTE
-H/O A fib restarted Hhome metoprolol and amiodarone (holding Coumadin).   -Plan for Eliquis  Monday.   - converted to A-fib on 1/8 Cards consulted- apprec rec's   - monitor

## 2022-01-09 NOTE — CONSULTS
[facility] [place]  Cardiology Consult       Tej Purdy  YOB: 1955  Medical Record Number:  [id number]  Attending Physician:  Leland Munoz MD   Date of Admission: 1/5/2022       Hospital Day:  4  Current Principal Problem:  End stage liver disease      History     Cc: post op atrial fibrillation     HPI  Mr Tej Purdy is a 66 year old male with PMHx of CASTILLO cirrhosis, HTN, HLD, paroxysmal atrial fibrillation, DM2 who presented to [facility] for liver transplant. He underwent surgery on 1/6 without apparent complication. Had been doing well post operatively, extubated on 1/7 and stepped down to floor. Tolerating diet, however on afternoon of 1/8 he developed atrial fibrillation with RVR. Cardiology was consulted for further recommendations.     Regarding his cardiac history, afib was first diagnosed around 5/2021. He follows with Dr. Tilley. He was initially cardioverted and placed on flecainide for rhythm control, and anticoagulated with warfarin. He had several recurrences of his atrial fib requiring multiple repeat cardioversions, last one 7/27/21. He was transitioned from flecainide to amiodarone prior to that. He is often asymptomatic when in AF but hasn't had any documented recurrences since then, or at least none of clinical significance. He has required hospitalization for RVR and acute pulmonary edema in the past.     Review of telemetry shows he went into AF ~12:45 on afternoon of 1/8. He has been continued on his home toprol 50 mg daily and amiodarone 200 mg daily. Anticoagulation currently held in the perioperative period. His rates have ranged ~100-130 over the past 24 hours. He has remained hemodynamically stable. Largely asymptomatic but reports he felt weak and lightheaded briefly yesterday, no symptoms today.       Medications - Outpatient  Prior to Admission medications    Medication Sig Start Date End Date Taking? Authorizing Provider   amiodarone (PACERONE)  200 MG Tab Take 1 tablet (200 mg total) by mouth once daily. 11/14/21   Solange Bill NP   apixaban (ELIQUIS) 5 mg Tab Take 1 tablet (5 mg total) by mouth 2 (two) times daily. 1/7/22   Leland Munoz MD   aspirin (ECOTRIN) 81 MG EC tablet Take 81 mg by mouth once daily.    Historical Provider   blood sugar diagnostic Strp To check BG 2 times daily, to use with insurance preferred meter 7/9/21   Carlos Argueta MD   blood-glucose meter kit To check BG 2 times daily, to use with insurance preferred meter 7/16/21   Carlos Argueta MD   gabapentin (NEURONTIN) 300 MG capsule Take 1 capsule (300 mg total) by mouth 3 (three) times daily as needed (neuropathy). 11/30/21   Carlos Arugeta MD   insulin (LANTUS SOLOSTAR U-100 INSULIN) glargine 100 units/mL (3mL) SubQ pen Inject 22 Units into the skin every evening. Adjust dose as directed until AM glucose at goal . Max daily dose 30 units/day 11/17/21   Carlos Argueta MD   lactulose (CHRONULAC) 10 gram/15 mL solution Take 30 g by mouth 3 (three) times daily.     Historical Provider   lancets Misc To check BG 2 times daily, to use with insurance preferred meter 7/9/21   Carlos Argueta MD   lisinopriL (PRINIVIL,ZESTRIL) 20 MG tablet Take 20 mg by mouth once daily.    Historical Provider   lovastatin (MEVACOR) 20 MG tablet Take 20 mg by mouth every evening.  3/9/20   Historical Provider   metFORMIN (GLUCOPHAGE) 1000 MG tablet Take 1,000 mg by mouth 2 (two) times daily with meals.  3/9/20   Historical Provider   metoprolol succinate (TOPROL-XL) 25 MG 24 hr tablet Take 2 tablets (50 mg total) by mouth once daily. 12/1/21   Didier Tilley MD   multivitamin capsule Take 1 capsule by mouth once daily.    Historical Provider   mycophenolate (CELLCEPT) 250 mg Cap Take 4 capsules (1,000 mg total) by mouth 2 (two) times daily. 1/7/22   Leland Munoz MD   nystatin (MYCOSTATIN) 100,000 unit/mL suspension Take 5 mLs (500,000 Units total) by mouth 3 (three)  "times daily after meals. STOP 1/26/22 1/7/22   Leland Munoz MD   omega-3 fatty acids/fish oil (FISH OIL-OMEGA-3 FATTY ACIDS) 300-1,000 mg capsule Take 1 capsule by mouth once daily.     Historical Provider   oxyCODONE-acetaminophen (PERCOCET) 5-325 mg per tablet Take 1 tablet by mouth every 4 (four) hours as needed for Pain.  Patient not taking: Reported on 11/11/2021 9/24/21   Susie Green MD   pantoprazole (PROTONIX) 20 MG tablet Take 1 tablet (20 mg total) by mouth once daily. 9/13/21   Thais Maxwell MD   pen needle, diabetic (BD ULTRA-FINE GÉNESIS PEN NEEDLE) 32 gauge x 5/32" Ndle Use with insulin once daily 11/17/21   Carlos Argueta MD   predniSONE (DELTASONE) 5 MG tablet Take by mouth daily:  20mg 1/12-1/18, 15mg 1/19-1/25, 10mg 1/26-2/1, 5mg 2/2-2/8, STOP 2/9/22 1/7/22   Leland Munoz MD   rifAXIMin (XIFAXAN) 550 mg Tab Take 1 tablet (550 mg total) by mouth 2 (two) times daily. 6/7/21   Thais Maxwell MD   sertraline (ZOLOFT) 25 MG tablet Take 25 mg by mouth once daily.  12/1/20   Historical Provider   sulfamethoxazole-trimethoprim 400-80mg (BACTRIM,SEPTRA) 400-80 mg per tablet Take 1 tablet by mouth every morning. STOP 7/7/22 1/13/22   Leland Munoz MD   tacrolimus (PROGRAF) 1 MG Cap Take 6 capsules (6 mg total) by mouth every 12 (twelve) hours. 1/7/22   Leland Munoz MD   tamsulosin (FLOMAX) 0.4 mg Cap Take 1 capsule (0.4 mg total) by mouth every evening. 9/29/21 9/29/22  Sandy Kay PA-C   valGANciclovir (VALCYTE) 450 mg Tab Take 1 tablet (450 mg total) by mouth once daily. STOP 7/7/22 1/16/22 1/16/23  Leland Munoz MD   warfarin (COUMADIN) 5 MG tablet 7.5mg PO daily except 5mg PO Tues/Sat -- OR AS DIRECTED BY COUMADIN CLINIC 11/16/21   Didier Tilley MD   zolpidem (AMBIEN) 5 MG Tab Take 1 tablet (5 mg total) by mouth nightly as needed (insomnia). 11/11/21 5/12/22  Thais Maxwell MD         Medications - Current  Scheduled Meds:   " amiodarone  200 mg Oral Daily    docusate sodium  100 mg Oral TID    famotidine  20 mg Oral Nightly    heparin (porcine)  5,000 Units Subcutaneous Q8H    insulin aspart U-100  4-8 Units Subcutaneous TIDWM    insulin detemir U-100  28 Units Subcutaneous Daily    [START ON 1/10/2022] methylPREDNISolone sodium succinate injection  80 mg Intravenous Daily    Followed by    [START ON 1/11/2022] methylPREDNISolone sodium succinate injection  40 mg Intravenous Daily    Followed by    [START ON 1/12/2022] predniSONE  20 mg Oral Daily    metoprolol succinate  50 mg Oral Daily    mupirocin  1 g Nasal BID    mycophenolate  1,000 mg Oral BID    nystatin  500,000 Units Mouth/Throat TID PC    sertraline  25 mg Oral Daily    [START ON 1/13/2022] sulfamethoxazole-trimethoprim 400-80mg  1 tablet Oral Daily AM    tacrolimus  0.5 mg Oral BID    tamsulosin  0.4 mg Oral QHS    [START ON 1/16/2022] valGANciclovir  450 mg Oral Daily     Continuous Infusions:  PRN Meds:.dextrose 50%, dextrose 50%, gabapentin, glucagon (human recombinant), glucose, glucose, insulin aspart U-100, oxyCODONE, oxyCODONE, sodium chloride 0.9%      Allergies  Review of patient's allergies indicates:   Allergen Reactions    Bee pollens Swelling     BEE STINGS swells body         Past Medical History  Past Medical History:   Diagnosis Date    Cirrhosis 11/23/2020    Diabetes mellitus, type 2     Diabetic neuropathy 11/24/2020    Disorder of kidney and ureter     HTN (hypertension) 11/23/2020    Hyperlipidemia 11/23/2020    Kidney stones 11/23/2020    S/p lithotripsy 2020    Leukocytosis 1/15/2021    Liver mass 11/23/2020    CASTILLO (nonalcoholic steatohepatitis) 11/23/2020    PAD (peripheral artery disease)     Portal hypertension 11/23/2020    Skin cancer 11/23/2020         Past Surgical History  Past Surgical History:   Procedure Laterality Date    COLONOSCOPY  10/2020    ESOPHAGOGASTRODUODENOSCOPY  10/2020     ESOPHAGOGASTRODUODENOSCOPY N/A 7/1/2021    Procedure: EGD (ESOPHAGOGASTRODUODENOSCOPY);  Surgeon: Jovan David MD;  Location: [facility] ENDO (4TH FLR);  Service: Endoscopy;  Laterality: N/A;  HCC. Listed for liver transplant. EGD for variceal surveillance.  cardiac clearance and blood thinenr approval received, see telephone encounter 6/23/21-BB  fully vaccinated-BB  labs same day of procedure-BB    EXCISION OF HYDROCELE Right     hemorroid surgery      hemorroidectomy      KIDNEY STONE SURGERY      LEFT HEART CATHETERIZATION N/A 1/27/2021    Procedure: Left heart cath;  Surgeon: Kris Shelton MD;  Location: [facility] CATH LAB;  Service: Cardiology;  Laterality: N/A;    liver mass removal      LIVER TRANSPLANT N/A 1/6/2022    Procedure: TRANSPLANT, LIVER;  Surgeon: Lizet Garcia MD;  Location: [facility] OR 2ND FLR;  Service: Transplant;  Laterality: N/A;    RIGHT HEART CATHETERIZATION Right 5/7/2021    Procedure: INSERTION, CATHETER, RIGHT HEART;  Surgeon: Jaden Schuster MD;  Location: [facility] CATH LAB;  Service: Cardiology;  Laterality: Right;    TREATMENT OF CARDIAC ARRHYTHMIA N/A 5/19/2021    Procedure: CARDIOVERSION;  Surgeon: Didier Tilley MD;  Location: [facility] EP LAB;  Service: Cardiology;  Laterality: N/A;  a fib, dccv/johny, mac, dm. sscu    TREATMENT OF CARDIAC ARRHYTHMIA N/A 5/31/2021    Procedure: CARDIOVERSION;  Surgeon: Daniel Arambula MD;  Location: [facility] EP LAB;  Service: Cardiology;  Laterality: N/A;  afib, DCCV, anest., DM, 3 prep    TREATMENT OF CARDIAC ARRHYTHMIA N/A 7/7/2021    Procedure: Cardioversion or Defibrillation;  Surgeon: Didier Tilley MD;  Location: [facility] EP LAB;  Service: Cardiology;  Laterality: N/A;  AF, JOHNY (cancel if complaint), DCCV, MAC, DM, 3 Prep    TREATMENT OF CARDIAC ARRHYTHMIA N/A 7/27/2021    Procedure: Cardioversion or Defibrillation;  Surgeon: Didier Tilley MD;  Location: [facility] EP LAB;  Service: Cardiology;  Laterality: N/A;  AF, JOHNY (cx if complaint), DCCV,  MAC, DM, 3 Prep         Social History  Social History     Socioeconomic History    Marital status:    Tobacco Use    Smoking status: Former Smoker     Quit date: 1980     Years since quittin.9    Smokeless tobacco: Never Used   Substance and Sexual Activity    Alcohol use: Not Currently    Drug use: Never     Social Determinants of Health     Financial Resource Strain: Unknown    Difficulty of Paying Living Expenses: Patient refused   Food Insecurity: Unknown    Worried About Running Out of Food in the Last Year: Patient refused    Ran Out of Food in the Last Year: Patient refused   Transportation Needs: No Transportation Needs    Lack of Transportation (Medical): No    Lack of Transportation (Non-Medical): No   Physical Activity: Unknown    Days of Exercise per Week: 0 days    Minutes of Exercise per Session: Patient refused   Stress: Stress Concern Present    Feeling of Stress : To some extent   Social Connections: Unknown    Frequency of Communication with Friends and Family: More than three times a week    Frequency of Social Gatherings with Friends and Family: Once a week    Active Member of Clubs or Organizations: Yes    Attends Club or Organization Meetings: 1 to 4 times per year    Marital Status: Living with partner   Housing Stability: Low Risk     Unable to Pay for Housing in the Last Year: No    Number of Places Lived in the Last Year: 1    Unstable Housing in the Last Year: No         ROS  10 point ROS performed and negative except as stated in HPI     Physical Examination         Vital Signs  Vitals  Temp: 98 °F (36.7 °C)  Temp src: Oral  Pulse: 100  Heart Rate Source: Monitor  Resp: 18  SpO2: 95 %  Pulse Oximetry Type: Continuous  Flow (L/min): 2  Oxygen Concentration (%): 50  O2 Device (Oxygen Therapy): room air  BP: 120/71  MAP (mmHg): 90  BP Location: Left arm  BP Method: Automatic  Patient Position: Sitting    Invasive Hemodynamic Monitoring  PAP:   PAP  (Mean): 16 mmHg  CVP (mean): 3 mmHg (flat)  SVO2 (%): 65 %  CO (L/min): 6.7 L/min  CI (L/min/m2): 2.9 L/min/m2  SVR (dyne*sec)/cm5: 996 (dyne*sec)/cm5  PVR (dyne*sec)/cm5: 91 (dyne*sec)/cm5  SQI: 1     24 Hour VS Range    Temp:  [98 °F (36.7 °C)-99.5 °F (37.5 °C)]   Pulse:  []   Resp:  [16-20]   BP: ()/(60-75)   SpO2:  [90 %-99 %]     Intake/Output Summary (Last 24 hours) at 1/9/2022 1353  Last data filed at 1/9/2022 0523  Gross per 24 hour   Intake 990 ml   Output 975 ml   Net 15 ml         Physical Exam:   Constitutional: no acute distress  HEENT: NCAT, EOMI, no scleral icterus  Cardiovascular: Irregularly irregular rhythm, 3/6 crescendo-decrescendo systolic murmur at RUSB, 2+ carotid, radial pulses bilaterally. No JVD or edema   Pulmonary: Clear to auscultation bilaterally   Abdomen: nontender, non-distended   Neuro: alert and oriented, no focal deficits  Extremities: warm, no edema   MSK: no deformities  Integument: intact, no rashes       Data       Recent Labs   Lab 01/07/22  0800 01/07/22  1513 01/08/22  0353 01/08/22  1628 01/09/22  0524   WBC 10.97 14.40* 15.60* 12.19 17.93*   HGB 11.8* 12.1* 12.0* 11.2* 12.2*   HCT 34.9* 36.9* 36.5* 34.0* 36.8*   PLT 73* 92* 91* 89* 135*        Recent Labs   Lab 01/07/22  0002 01/07/22  0353 01/07/22  0800 01/08/22  0353 01/09/22  0524   INR 1.6* 1.4* 1.3* 1.1 1.1        Recent Labs   Lab 01/07/22  0353 01/07/22  0800 01/08/22  0353 01/08/22  1628 01/09/22  0524    141 136 133* 132*   K 4.4 4.2 4.8 4.6 4.5    103 97 95 94*   CO2 26 27 27 28 27   BUN 19 18 36* 46* 52*   CREATININE 1.1 1.0 1.5* 1.4 1.4   ANIONGAP 11 11 12 10 11   CALCIUM 8.1* 8.7 8.7 8.3* 8.4*        Recent Labs   Lab 01/07/22  0002 01/07/22  0002 01/07/22  0353 01/07/22  0353 01/07/22  0800 01/07/22  1513 01/08/22  0353 01/08/22  1628 01/09/22  0524   PROT 5.7*  --  5.4*  --   --   --  5.9* 5.5* 5.7*   ALBUMIN 3.9  --  3.6  --   --   --  3.7 3.4* 3.4*   BILITOT 2.6*  --  2.6*  --    --   --  1.4* 1.2* 1.0   ALKPHOS 55  --  56  --   --   --  64 63 67   *  792*   < > 655*  671*   < > 549* 448* 326* 231* 159*   *  --  507*  --   --   --  483* 411* 364*    < > = values in this interval not displayed.        No results for input(s): TROPONINI in the last 168 hours.     BNP (pg/mL)   Date Value   06/08/2021 154 (H)   05/21/2021 178 (H)       No results for input(s): LABBLOO in the last 168 hours.     Telemetry: onset of atrial fibrillation 12:45 PM on 1/8. Telemetry only dates back to morning of 1/8, unable to see strips prior to that.         Assessment & Plan   66 year old male with PMHx of CASTILLO cirrhosis, HTN, HLD, paroxysmal atrial fibrillation, DM2 [place] [facility] [place] Did well in surgery however went back into atrial fib with RVR yesterday afternoon.     Atrial fibrillation with RVR:   S/p multiple cardioversions, previously failed flecainide and most recently on amiodarone. Was doing well over the last few months but had recurrence in the post op setting. Given multiple failed cardioversions in the past, will attempt rate control for the time being. If this proves difficult will get EP involved.   -Increase toprol to 50 BID tonight then 100 mg daily tomorrow  -Continue amiodarone at current dose  -Restart anticoagulation when primary team comfortable. DOAC preferred   -If rate controlled and asymptomatic may just continue that strategy and follow up with EP outpatient   -NPO at midnight for possibility of cardioversion       Thank you for the consult. We will follow up with patient.     Cody Birmingham MD  [facility]   Cardiovascular Disease PGY-IV

## 2022-01-09 NOTE — SUBJECTIVE & OBJECTIVE
Scheduled Meds:   amiodarone  200 mg Oral Daily    docusate sodium  100 mg Oral TID    famotidine  20 mg Oral Nightly    heparin (porcine)  5,000 Units Subcutaneous Q8H    insulin aspart U-100  4-8 Units Subcutaneous TIDWM    insulin detemir U-100  28 Units Subcutaneous Daily    [START ON 1/10/2022] methylPREDNISolone sodium succinate injection  80 mg Intravenous Daily    Followed by    [START ON 1/11/2022] methylPREDNISolone sodium succinate injection  40 mg Intravenous Daily    Followed by    [START ON 1/12/2022] predniSONE  20 mg Oral Daily    metoprolol succinate  50 mg Oral Daily    mupirocin  1 g Nasal BID    mycophenolate  1,000 mg Oral BID    nystatin  500,000 Units Mouth/Throat TID PC    sertraline  25 mg Oral Daily    [START ON 1/13/2022] sulfamethoxazole-trimethoprim 400-80mg  1 tablet Oral Daily AM    tacrolimus  0.5 mg Oral BID    tamsulosin  0.4 mg Oral QHS    [START ON 1/16/2022] valGANciclovir  450 mg Oral Daily     Continuous Infusions:  PRN Meds:dextrose 50%, dextrose 50%, gabapentin, glucagon (human recombinant), glucose, glucose, insulin aspart U-100, oxyCODONE, oxyCODONE, sodium chloride 0.9%    Review of Systems   Constitutional: Positive for activity change and appetite change.   Eyes: Negative for visual disturbance.   Respiratory: Negative for cough and shortness of breath.    Cardiovascular: Negative for chest pain, palpitations and leg swelling.   Gastrointestinal: Positive for abdominal pain. Negative for abdominal distention, constipation, diarrhea, nausea and vomiting.   Genitourinary: Negative for difficulty urinating.   Musculoskeletal: Negative for arthralgias.   Skin: Negative for wound.   Allergic/Immunologic: Positive for immunocompromised state.   Neurological: Negative for dizziness.   Hematological: Negative for adenopathy. Does not bruise/bleed easily.   Psychiatric/Behavioral: Negative for agitation.     Objective:     Vital Signs (Most Recent):  Temp: 98 °F  (36.7 °C) (01/09/22 0800)  Pulse: 100 (01/09/22 0800)  Resp: 18 (01/09/22 0800)  BP: 120/71 (01/09/22 0800)  SpO2: 95 % (01/09/22 0846) Vital Signs (24h Range):  Temp:  [98 °F (36.7 °C)-99.5 °F (37.5 °C)] 98 °F (36.7 °C)  Pulse:  [] 100  Resp:  [16-20] 18  SpO2:  [90 %-99 %] 95 %  BP: ()/(60-75) 120/71     Weight: 103.7 kg (228 lb 9.9 oz)  Body mass index is 28.58 kg/m².    Intake/Output - Last 3 Shifts       01/07 0700  01/08 0659 01/08 0700 01/09 0659 01/09 0700  01/10 0659    P.O. 840 1350     I.V. (mL/kg) 23.9 (0.2)      Blood       IV Piggyback 89.1      Total Intake(mL/kg) 953 (9.3) 1350 (13)     Urine (mL/kg/hr) 1250 (0.5) 950 (0.4)     Drains 200 245     Stool 0 0     Blood       Total Output 1450 1195     Net -497 +155            Stool Occurrence 0 x 0 x           Physical Exam  Vitals and nursing note reviewed.   Constitutional:       Appearance: He is well-developed.   HENT:      Head: Normocephalic.   Eyes:      Conjunctiva/sclera: Conjunctivae normal.   Cardiovascular:      Rate and Rhythm: Normal rate and regular rhythm.      Heart sounds: No murmur heard.      Pulmonary:      Effort: Pulmonary effort is normal.      Breath sounds: Normal breath sounds.   Abdominal:      General: Bowel sounds are normal. There is no distension.      Palpations: Abdomen is soft.      Tenderness: There is no abdominal tenderness.          Comments: Staples CDI        Musculoskeletal:         General: Normal range of motion.      Cervical back: Normal range of motion.   Skin:     General: Skin is warm and dry.      Capillary Refill: Capillary refill takes less than 2 seconds.   Neurological:      Mental Status: He is alert and oriented to person, place, and time.   Psychiatric:         Behavior: Behavior normal.         Thought Content: Thought content normal.         Judgment: Judgment normal.         Laboratory:  Immunosuppressants         Stop Route Frequency     tacrolimus capsule 0.5 mg         -- Oral 2  times daily     mycophenolate capsule 1,000 mg         -- Oral 2 times daily        CBC:   Recent Labs   Lab 01/09/22 0524   WBC 17.93*   RBC 4.13*   HGB 12.2*   HCT 36.8*   *   MCV 89   MCH 29.5   MCHC 33.2     BMP:   Recent Labs   Lab 01/09/22 0524   *   *   K 4.5   CL 94*   CO2 27   BUN 52*   CREATININE 1.4   CALCIUM 8.4*     CMP:   Recent Labs   Lab 01/09/22 0524   *   CALCIUM 8.4*   ALBUMIN 3.4*   PROT 5.7*   *   K 4.5   CO2 27   CL 94*   BUN 52*   CREATININE 1.4   ALKPHOS 67   *   *   BILITOT 1.0     Coagulation:   Recent Labs   Lab 01/09/22 0524   INR 1.1   APTT 25.4     Labs within the past 24 hours have been reviewed.    Diagnostic Results:  US Liver Transplant Post:  Results for orders placed during the hospital encounter of 01/05/22    US Doppler Liver Transplant Post (xpd)    Narrative  EXAMINATION:  US DOPPLER LIVER TRANSPLANT POST (XPD)    CLINICAL HISTORY:  Assess perfusion following liver transplant;    TECHNIQUE:  Limited abdominal ultrasound of the transplant liver with Doppler evaluation.  Color and spectral Doppler were performed.    COMPARISON:  None.    FINDINGS:  Patient is status post orthotopic liver transplant 01/06/2022.  Liver allograft is normal in size.  The liver demonstrates homogeneous echotexture.  No focal hepatic lesions are seen.  No fluid collections.    The common duct is not dilated, measuring 3 mm.  No dilated intrahepatic radicles are seen.    Color flow and spectral waveform analysis was performed.  The main portal vein, right portal vein, left portal vein, middle hepatic vein, right hepatic vein, left hepatic vein, and IVC are patent with proper directional flow.    Anastomosis site of the main hepatic artery demonstrates a peak systolic velocity 103 cm/sec.    Main hepatic artery demonstrates resistive index 0.82 with normal waveform.    Left hepatic artery shows resistive index 0.71 with normal waveform.    Anterior  branch of the right hepatic artery demonstrates resistive index 0.76 with normal waveform.    Posterior branch of the right hepatic artery demonstrates resistive index 0.81 with normal waveform.    Impression  Satisfactory Doppler evaluation of liver allograft.    Findings were discussed with Dr. Munoz by Dr. Alvarenga at approximately 08:10 a.m. on 01/07/2022.    Electronically signed by resident: David Meléndez  Date:    01/07/2022  Time:    06:28    Electronically signed by: Kris Alvarenga MD  Date:    01/07/2022  Time:    08:12    Debility/Functional status: Patient debilitated by evidence of Weakness. Physical and occupational therapy ordered daily to evaluate and treat. Debility was: present on admission.

## 2022-01-09 NOTE — PROGRESS NOTES
[facility]  Liver Transplant  Progress Note    Patient Name: Tej Purdy  MRN: [id number]  Admission Date: 1/5/2022  Hospital Length of Stay: 4 days  Code Status: Full Code  Primary Care Provider: Thais Maxwell MD  Post-Operative Day: 3    ORGAN:   LIVER  Disease Etiology: Primary Liver Malignancy: Hepatoma (HCC) and Cirrhosis  Donor Type:   Donation after Brain Death  CDC High Risk:   No  Donor CMV Status:   Donor CMV Status: Positive  Donor HBcAB:   Negative  Donor HCV Status:   Negative  Donor HBV JAYLIN: Negative  Donor HCV JAYLIN: Negative  Whole or Partial: Whole Liver  Biliary Anastomosis: End to End  Arterial Anatomy: Replaced Left Hepatic from Left Gastric  Subjective:     History of Present Illness:  Mr Tej Purdy is a 67 y/o M with Afib (on coumadin), CASTILLO cirrhosis, and HCC s/p TACE and RFA who is listed for liver transplant with MELD 24. He now presents to the hospital for liver transplant. He reports being in his usual state of health. Denies fever/chills. Pre op labs and imaging pending.           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 Replaced LHA, reconstructed. H/O A fib resumed home metoprolol and amiodarone holding Warfarin, Plan for Eliquis Monday.    HOSPITAL INTERVAL: Patient converted to A-fib on 1/8 heart rates from 90's 140's with ambulation. Cards consulted otherwise  Pt reports feeling well this am. Progressing well post-op. Tolerating diet, ambulating, +flatus -BM.  Encouraged oral  intake.  Pt improving UOP. LFTs stable, monitoring Cr giving one dose of IV lasix today.   VSS, monitor.         Scheduled Meds:   amiodarone  200 mg Oral Daily    docusate sodium  100 mg Oral TID    famotidine  20 mg Oral Nightly    heparin (porcine)  5,000 Units Subcutaneous Q8H    insulin aspart U-100  4-8 Units Subcutaneous TIDWM    insulin detemir U-100  28 Units Subcutaneous Daily    [START ON 1/10/2022]  methylPREDNISolone sodium succinate injection  80 mg Intravenous Daily    Followed by    [START ON 1/11/2022] methylPREDNISolone sodium succinate injection  40 mg Intravenous Daily    Followed by    [START ON 1/12/2022] predniSONE  20 mg Oral Daily    metoprolol succinate  50 mg Oral Daily    mupirocin  1 g Nasal BID    mycophenolate  1,000 mg Oral BID    nystatin  500,000 Units Mouth/Throat TID PC    sertraline  25 mg Oral Daily    [START ON 1/13/2022] sulfamethoxazole-trimethoprim 400-80mg  1 tablet Oral Daily AM    tacrolimus  0.5 mg Oral BID    tamsulosin  0.4 mg Oral QHS    [START ON 1/16/2022] valGANciclovir  450 mg Oral Daily     Continuous Infusions:  PRN Meds:dextrose 50%, dextrose 50%, gabapentin, glucagon (human recombinant), glucose, glucose, insulin aspart U-100, oxyCODONE, oxyCODONE, sodium chloride 0.9%    Review of Systems   Constitutional: Positive for activity change and appetite change.   Eyes: Negative for visual disturbance.   Respiratory: Negative for cough and shortness of breath.    Cardiovascular: Negative for chest pain, palpitations and leg swelling.   Gastrointestinal: Positive for abdominal pain. Negative for abdominal distention, constipation, diarrhea, nausea and vomiting.   Genitourinary: Negative for difficulty urinating.   Musculoskeletal: Negative for arthralgias.   Skin: Negative for wound.   Allergic/Immunologic: Positive for immunocompromised state.   Neurological: Negative for dizziness.   Hematological: Negative for adenopathy. Does not bruise/bleed easily.   Psychiatric/Behavioral: Negative for agitation.     Objective:     Vital Signs (Most Recent):  Temp: 98 °F (36.7 °C) (01/09/22 0800)  Pulse: 100 (01/09/22 0800)  Resp: 18 (01/09/22 0800)  BP: 120/71 (01/09/22 0800)  SpO2: 95 % (01/09/22 0846) Vital Signs (24h Range):  Temp:  [98 °F (36.7 °C)-99.5 °F (37.5 °C)] 98 °F (36.7 °C)  Pulse:  [] 100  Resp:  [16-20] 18  SpO2:  [90 %-99 %] 95 %  BP:  ()/(60-75) 120/71     Weight: 103.7 kg (228 lb 9.9 oz)  Body mass index is 28.58 kg/m².    Intake/Output - Last 3 Shifts       01/07 0700 01/08 0659 01/08 0700 01/09 0659 01/09 0700  01/10 0659    P.O. 840 1350     I.V. (mL/kg) 23.9 (0.2)      Blood       IV Piggyback 89.1      Total Intake(mL/kg) 953 (9.3) 1350 (13)     Urine (mL/kg/hr) 1250 (0.5) 950 (0.4)     Drains 200 245     Stool 0 0     Blood       Total Output 1450 1195     Net -497 +155            Stool Occurrence 0 x 0 x           Physical Exam  Vitals and nursing note reviewed.   Constitutional:       Appearance: He is well-developed.   HENT:      Head: Normocephalic.   Eyes:      Conjunctiva/sclera: Conjunctivae normal.   Cardiovascular:      Rate and Rhythm: Normal rate and regular rhythm.      Heart sounds: No murmur heard.      Pulmonary:      Effort: Pulmonary effort is normal.      Breath sounds: Normal breath sounds.   Abdominal:      General: Bowel sounds are normal. There is no distension.      Palpations: Abdomen is soft.      Tenderness: There is no abdominal tenderness.          Comments: Staples CDI        Musculoskeletal:         General: Normal range of motion.      Cervical back: Normal range of motion.   Skin:     General: Skin is warm and dry.      Capillary Refill: Capillary refill takes less than 2 seconds.   Neurological:      Mental Status: He is alert and oriented to person, place, and time.   Psychiatric:         Behavior: Behavior normal.         Thought Content: Thought content normal.         Judgment: Judgment normal.         Laboratory:  Immunosuppressants         Stop Route Frequency     tacrolimus capsule 0.5 mg         -- Oral 2 times daily     mycophenolate capsule 1,000 mg         -- Oral 2 times daily        CBC:   Recent Labs   Lab 01/09/22  0524   WBC 17.93*   RBC 4.13*   HGB 12.2*   HCT 36.8*   *   MCV 89   MCH 29.5   MCHC 33.2     BMP:   Recent Labs   Lab 01/09/22 0524   *   *   K 4.5    CL 94*   CO2 27   BUN 52*   CREATININE 1.4   CALCIUM 8.4*     CMP:   Recent Labs   Lab 01/09/22  0524   *   CALCIUM 8.4*   ALBUMIN 3.4*   PROT 5.7*   *   K 4.5   CO2 27   CL 94*   BUN 52*   CREATININE 1.4   ALKPHOS 67   *   *   BILITOT 1.0     Coagulation:   Recent Labs   Lab 01/09/22  0524   INR 1.1   APTT 25.4     Labs within the past 24 hours have been reviewed.    Diagnostic Results:  US Liver Transplant Post:  Results for orders placed during the hospital encounter of 01/05/22    US Doppler Liver Transplant Post (xpd)    Narrative  EXAMINATION:  US DOPPLER LIVER TRANSPLANT POST (XPD)    CLINICAL HISTORY:  Assess perfusion following liver transplant;    TECHNIQUE:  Limited abdominal ultrasound of the transplant liver with Doppler evaluation.  Color and spectral Doppler were performed.    COMPARISON:  None.    FINDINGS:  Patient is status post orthotopic liver transplant 01/06/2022.  Liver allograft is normal in size.  The liver demonstrates homogeneous echotexture.  No focal hepatic lesions are seen.  No fluid collections.    The common duct is not dilated, measuring 3 mm.  No dilated intrahepatic radicles are seen.    Color flow and spectral waveform analysis was performed.  The main portal vein, right portal vein, left portal vein, middle hepatic vein, right hepatic vein, left hepatic vein, and IVC are patent with proper directional flow.    Anastomosis site of the main hepatic artery demonstrates a peak systolic velocity 103 cm/sec.    Main hepatic artery demonstrates resistive index 0.82 with normal waveform.    Left hepatic artery shows resistive index 0.71 with normal waveform.    Anterior branch of the right hepatic artery demonstrates resistive index 0.76 with normal waveform.    Posterior branch of the right hepatic artery demonstrates resistive index 0.81 with normal waveform.    Impression  Satisfactory Doppler evaluation of liver allograft.    Findings were discussed with  Dr. Munoz by Dr. Alvarenga at approximately 08:10 a.m. on 01/07/2022.    Electronically signed by resident: David Meléndez  Date:    01/07/2022  Time:    06:28    Electronically signed by: Kris Alvarenga MD  Date:    01/07/2022  Time:    08:12    Debility/Functional status: Patient debilitated by evidence of Weakness. Physical and occupational therapy ordered daily to evaluate and treat. Debility was: present on admission.    Assessment/Plan:     Adverse effect of adrenal cortical steroids, sequela  -monitor       Prophylactic immunotherapy  - continue prograf  - continue to monitor for toxic side effects and check daily levels. Will adjust for therapeutic dose        At risk for opportunistic infections  - Bactrim for PCP ppx.  - Valcyte for CMV ppx.      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 Replaced LHA, reconstructed.      Atrial fibrillation  -H/O A fib restarted Hhome metoprolol and amiodarone (holding Coumadin).   -Plan for Eliquis  Monday.   - converted to A-fib on 1/8 Cards consulted- apprec rec's   - monitor          Type 2 diabetes mellitus  - Endocrine following patient, appreciate rec's   - Monitor         VTE Risk Mitigation (From admission, onward)         Ordered     heparin (porcine) injection 5,000 Units  Every 8 hours         01/06/22 1314     IP VTE HIGH RISK PATIENT  Once         01/06/22 1314                The patients clinical status was discussed at multidisplinary rounds, involving transplant surgery, transplant medicine, pharmacy, nursing, nutrition, and social work    Discharge Planning:  No Patient Care Coordination Note on file.      GENE Hou  Liver Transplant  [facility]

## 2022-01-10 ENCOUNTER — TELEPHONE (OUTPATIENT)
Dept: TRANSPLANT | Facility: CLINIC | Age: 67
End: 2022-01-10
Payer: MEDICARE

## 2022-01-10 PROBLEM — K76.6 PORTAL HYPERTENSION: Status: RESOLVED | Noted: 2020-11-23 | Resolved: 2022-01-10

## 2022-01-10 PROBLEM — C22.0 HCC (HEPATOCELLULAR CARCINOMA): Status: RESOLVED | Noted: 2020-12-29 | Resolved: 2022-01-10

## 2022-01-10 PROBLEM — K75.81 NASH (NONALCOHOLIC STEATOHEPATITIS): Status: RESOLVED | Noted: 2020-11-23 | Resolved: 2022-01-10

## 2022-01-10 PROBLEM — R16.0 LIVER MASS: Status: RESOLVED | Noted: 2020-11-23 | Resolved: 2022-01-10

## 2022-01-10 PROBLEM — K74.60 CIRRHOSIS: Status: RESOLVED | Noted: 2020-11-23 | Resolved: 2022-01-10

## 2022-01-10 PROBLEM — J96.01 ACUTE HYPOXEMIC RESPIRATORY FAILURE: Status: RESOLVED | Noted: 2021-05-21 | Resolved: 2022-01-10

## 2022-01-10 PROBLEM — Z76.82 AWAITING LIVER TRANSPLANT: Status: RESOLVED | Noted: 2021-05-06 | Resolved: 2022-01-10

## 2022-01-10 PROBLEM — I10 HTN (HYPERTENSION): Status: RESOLVED | Noted: 2020-11-23 | Resolved: 2022-01-10

## 2022-01-10 PROBLEM — D62 ACUTE BLOOD LOSS ANEMIA: Status: ACTIVE | Noted: 2022-01-10

## 2022-01-10 PROBLEM — D63.8 ANEMIA OF CHRONIC DISEASE: Status: ACTIVE | Noted: 2022-01-10

## 2022-01-10 PROBLEM — Z01.818 PRE-TRANSPLANT EVALUATION FOR LIVER TRANSPLANT: Status: RESOLVED | Noted: 2021-08-18 | Resolved: 2022-01-10

## 2022-01-10 LAB
ALBUMIN SERPL BCP-MCNC: 3.3 G/DL (ref 3.5–5.2)
ALP SERPL-CCNC: 71 U/L (ref 55–135)
ALT SERPL W/O P-5'-P-CCNC: 280 U/L (ref 10–44)
ANION GAP SERPL CALC-SCNC: 9 MMOL/L (ref 8–16)
APTT BLDCRRT: 22.5 SEC (ref 21–32)
AST SERPL-CCNC: 95 U/L (ref 10–40)
BASOPHILS # BLD AUTO: 0.03 K/UL (ref 0–0.2)
BASOPHILS NFR BLD: 0.2 % (ref 0–1.9)
BILIRUB SERPL-MCNC: 1.1 MG/DL (ref 0.1–1)
BUN SERPL-MCNC: 54 MG/DL (ref 8–23)
CALCIUM SERPL-MCNC: 8.9 MG/DL (ref 8.7–10.5)
CHLORIDE SERPL-SCNC: 97 MMOL/L (ref 95–110)
CO2 SERPL-SCNC: 29 MMOL/L (ref 23–29)
CREAT SERPL-MCNC: 1.1 MG/DL (ref 0.5–1.4)
DIFFERENTIAL METHOD: ABNORMAL
EOSINOPHIL # BLD AUTO: 0 K/UL (ref 0–0.5)
EOSINOPHIL NFR BLD: 0.1 % (ref 0–8)
ERYTHROCYTE [DISTWIDTH] IN BLOOD BY AUTOMATED COUNT: 13.9 % (ref 11.5–14.5)
EST. GFR  (AFRICAN AMERICAN): >60 ML/MIN/1.73 M^2
EST. GFR  (NON AFRICAN AMERICAN): >60 ML/MIN/1.73 M^2
GLUCOSE SERPL-MCNC: 141 MG/DL (ref 70–110)
HCT VFR BLD AUTO: 37.7 % (ref 40–54)
HGB BLD-MCNC: 12.6 G/DL (ref 14–18)
IMM GRANULOCYTES # BLD AUTO: 0.29 K/UL (ref 0–0.04)
IMM GRANULOCYTES NFR BLD AUTO: 1.9 % (ref 0–0.5)
INR PPP: 1.1 (ref 0.8–1.2)
LYMPHOCYTES # BLD AUTO: 0.7 K/UL (ref 1–4.8)
LYMPHOCYTES NFR BLD: 4.7 % (ref 18–48)
MAGNESIUM SERPL-MCNC: 2.2 MG/DL (ref 1.6–2.6)
MCH RBC QN AUTO: 30.3 PG (ref 27–31)
MCHC RBC AUTO-ENTMCNC: 33.4 G/DL (ref 32–36)
MCV RBC AUTO: 91 FL (ref 82–98)
MONOCYTES # BLD AUTO: 1.3 K/UL (ref 0.3–1)
MONOCYTES NFR BLD: 8.3 % (ref 4–15)
NEUTROPHILS # BLD AUTO: 13.1 K/UL (ref 1.8–7.7)
NEUTROPHILS NFR BLD: 84.8 % (ref 38–73)
NRBC BLD-RTO: 0 /100 WBC
PLATELET # BLD AUTO: 151 K/UL (ref 150–450)
PMV BLD AUTO: 11.5 FL (ref 9.2–12.9)
POCT GLUCOSE: 135 MG/DL (ref 70–110)
POCT GLUCOSE: 145 MG/DL (ref 70–110)
POCT GLUCOSE: 213 MG/DL (ref 70–110)
POCT GLUCOSE: 229 MG/DL (ref 70–110)
POTASSIUM SERPL-SCNC: 4.3 MMOL/L (ref 3.5–5.1)
PROT SERPL-MCNC: 5.7 G/DL (ref 6–8.4)
PROTHROMBIN TIME: 12 SEC (ref 9–12.5)
RBC # BLD AUTO: 4.16 M/UL (ref 4.6–6.2)
SODIUM SERPL-SCNC: 135 MMOL/L (ref 136–145)
TACROLIMUS BLD-MCNC: 5.4 NG/ML (ref 5–15)
WBC # BLD AUTO: 15.47 K/UL (ref 3.9–12.7)

## 2022-01-10 PROCEDURE — 80197 ASSAY OF TACROLIMUS: CPT | Performed by: STUDENT IN AN ORGANIZED HEALTH CARE EDUCATION/TRAINING PROGRAM

## 2022-01-10 PROCEDURE — 63600175 PHARM REV CODE 636 W HCPCS: Performed by: STUDENT IN AN ORGANIZED HEALTH CARE EDUCATION/TRAINING PROGRAM

## 2022-01-10 PROCEDURE — 80053 COMPREHEN METABOLIC PANEL: CPT | Performed by: STUDENT IN AN ORGANIZED HEALTH CARE EDUCATION/TRAINING PROGRAM

## 2022-01-10 PROCEDURE — 99233 SBSQ HOSP IP/OBS HIGH 50: CPT | Mod: 24,,, | Performed by: PHYSICIAN ASSISTANT

## 2022-01-10 PROCEDURE — 25000003 PHARM REV CODE 250: Performed by: STUDENT IN AN ORGANIZED HEALTH CARE EDUCATION/TRAINING PROGRAM

## 2022-01-10 PROCEDURE — 63600175 PHARM REV CODE 636 W HCPCS: Performed by: PHYSICIAN ASSISTANT

## 2022-01-10 PROCEDURE — 25000003 PHARM REV CODE 250: Performed by: PHYSICIAN ASSISTANT

## 2022-01-10 PROCEDURE — 85730 THROMBOPLASTIN TIME PARTIAL: CPT | Performed by: STUDENT IN AN ORGANIZED HEALTH CARE EDUCATION/TRAINING PROGRAM

## 2022-01-10 PROCEDURE — 85610 PROTHROMBIN TIME: CPT | Performed by: STUDENT IN AN ORGANIZED HEALTH CARE EDUCATION/TRAINING PROGRAM

## 2022-01-10 PROCEDURE — 83735 ASSAY OF MAGNESIUM: CPT | Performed by: NURSE PRACTITIONER

## 2022-01-10 PROCEDURE — 85025 COMPLETE CBC W/AUTO DIFF WBC: CPT | Performed by: STUDENT IN AN ORGANIZED HEALTH CARE EDUCATION/TRAINING PROGRAM

## 2022-01-10 PROCEDURE — 99233 PR SUBSEQUENT HOSPITAL CARE,LEVL III: ICD-10-PCS | Mod: 24,,, | Performed by: PHYSICIAN ASSISTANT

## 2022-01-10 PROCEDURE — 25000003 PHARM REV CODE 250: Performed by: NURSE PRACTITIONER

## 2022-01-10 PROCEDURE — 20600001 HC STEP DOWN PRIVATE ROOM

## 2022-01-10 RX ORDER — TACROLIMUS 1 MG/1
1 CAPSULE ORAL 2 TIMES DAILY
Status: DISCONTINUED | OUTPATIENT
Start: 2022-01-10 | End: 2022-01-11

## 2022-01-10 RX ORDER — LIDOCAINE HYDROCHLORIDE 10 MG/ML
1 INJECTION INFILTRATION; PERINEURAL ONCE
Status: COMPLETED | OUTPATIENT
Start: 2022-01-10 | End: 2022-01-10

## 2022-01-10 RX ORDER — TACROLIMUS 0.5 MG/1
0.5 CAPSULE ORAL ONCE
Status: COMPLETED | OUTPATIENT
Start: 2022-01-10 | End: 2022-01-10

## 2022-01-10 RX ORDER — CALCIUM CARBONATE 200(500)MG
500 TABLET,CHEWABLE ORAL 3 TIMES DAILY PRN
Status: DISCONTINUED | OUTPATIENT
Start: 2022-01-10 | End: 2022-01-13 | Stop reason: HOSPADM

## 2022-01-10 RX ADMIN — CALCIUM CARBONATE (ANTACID) CHEW TAB 500 MG 500 MG: 500 CHEW TAB at 01:01

## 2022-01-10 RX ADMIN — MYCOPHENOLATE MOFETIL 1000 MG: 250 CAPSULE ORAL at 09:01

## 2022-01-10 RX ADMIN — DOCUSATE SODIUM 100 MG: 100 CAPSULE ORAL at 03:01

## 2022-01-10 RX ADMIN — INSULIN ASPART 8 UNITS: 100 INJECTION, SOLUTION INTRAVENOUS; SUBCUTANEOUS at 01:01

## 2022-01-10 RX ADMIN — INSULIN ASPART 8 UNITS: 100 INJECTION, SOLUTION INTRAVENOUS; SUBCUTANEOUS at 05:01

## 2022-01-10 RX ADMIN — NYSTATIN 500000 UNITS: 500000 SUSPENSION ORAL at 01:01

## 2022-01-10 RX ADMIN — LIDOCAINE HYDROCHLORIDE 1 ML: 10 INJECTION, SOLUTION INFILTRATION; PERINEURAL at 08:01

## 2022-01-10 RX ADMIN — TAMSULOSIN HYDROCHLORIDE 0.4 MG: 0.4 CAPSULE ORAL at 09:01

## 2022-01-10 RX ADMIN — METHYLPREDNISOLONE SODIUM SUCCINATE 80 MG: 40 INJECTION, POWDER, FOR SOLUTION INTRAMUSCULAR; INTRAVENOUS at 08:01

## 2022-01-10 RX ADMIN — DOCUSATE SODIUM 100 MG: 100 CAPSULE ORAL at 09:01

## 2022-01-10 RX ADMIN — FAMOTIDINE 20 MG: 20 TABLET ORAL at 09:01

## 2022-01-10 RX ADMIN — HEPARIN SODIUM 5000 UNITS: 5000 INJECTION INTRAVENOUS; SUBCUTANEOUS at 05:01

## 2022-01-10 RX ADMIN — TACROLIMUS 0.5 MG: 0.5 CAPSULE ORAL at 08:01

## 2022-01-10 RX ADMIN — OXYCODONE HYDROCHLORIDE 10 MG: 10 TABLET ORAL at 11:01

## 2022-01-10 RX ADMIN — NYSTATIN 500000 UNITS: 500000 SUSPENSION ORAL at 09:01

## 2022-01-10 RX ADMIN — HEPARIN SODIUM 5000 UNITS: 5000 INJECTION INTRAVENOUS; SUBCUTANEOUS at 01:01

## 2022-01-10 RX ADMIN — INSULIN DETEMIR 28 UNITS: 100 INJECTION, SOLUTION SUBCUTANEOUS at 08:01

## 2022-01-10 RX ADMIN — DOCUSATE SODIUM 100 MG: 100 CAPSULE ORAL at 08:01

## 2022-01-10 RX ADMIN — TACROLIMUS 0.5 MG: 0.5 CAPSULE ORAL at 11:01

## 2022-01-10 RX ADMIN — SERTRALINE HYDROCHLORIDE 25 MG: 25 TABLET ORAL at 08:01

## 2022-01-10 RX ADMIN — INSULIN ASPART 4 UNITS: 100 INJECTION, SOLUTION INTRAVENOUS; SUBCUTANEOUS at 05:01

## 2022-01-10 RX ADMIN — MUPIROCIN 1 G: 20 OINTMENT TOPICAL at 08:01

## 2022-01-10 RX ADMIN — INSULIN ASPART 4 UNITS: 100 INJECTION, SOLUTION INTRAVENOUS; SUBCUTANEOUS at 01:01

## 2022-01-10 RX ADMIN — TACROLIMUS 1 MG: 1 CAPSULE ORAL at 05:01

## 2022-01-10 RX ADMIN — METOPROLOL SUCCINATE 100 MG: 50 TABLET, EXTENDED RELEASE ORAL at 08:01

## 2022-01-10 RX ADMIN — NYSTATIN 500000 UNITS: 500000 SUSPENSION ORAL at 05:01

## 2022-01-10 RX ADMIN — MUPIROCIN 1 G: 20 OINTMENT TOPICAL at 09:01

## 2022-01-10 RX ADMIN — MYCOPHENOLATE MOFETIL 1000 MG: 250 CAPSULE ORAL at 08:01

## 2022-01-10 RX ADMIN — CALCIUM CARBONATE (ANTACID) CHEW TAB 500 MG 500 MG: 500 CHEW TAB at 05:01

## 2022-01-10 RX ADMIN — HEPARIN SODIUM 5000 UNITS: 5000 INJECTION INTRAVENOUS; SUBCUTANEOUS at 09:01

## 2022-01-10 RX ADMIN — AMIODARONE HYDROCHLORIDE 200 MG: 200 TABLET ORAL at 08:01

## 2022-01-10 NOTE — PROGRESS NOTES
[facility]  Liver Transplant  Progress Note    Patient Name: Tej Purdy  MRN: [id number]  Admission Date: 1/5/2022  Hospital Length of Stay: 5 days  Code Status: Full Code  Primary Care Provider: Thais Maxwell MD  Post-Operative Day: 4    ORGAN:   LIVER  Disease Etiology: Primary Liver Malignancy: Hepatoma (HCC) and Cirrhosis  Donor Type:   Donation after Brain Death  CDC High Risk:   No  Donor CMV Status:   Donor CMV Status: Positive  Donor HBcAB:   Negative  Donor HCV Status:   Negative  Donor HBV JAYLIN: Negative  Donor HCV JAYLIN: Negative  Whole or Partial: Whole Liver  Biliary Anastomosis: End to End  Arterial Anatomy: Replaced Left Hepatic from Left Gastric  Subjective:     History of Present Illness:  Mr Tej Purdy is a 65 y/o M with Afib (on coumadin), CASTILLO cirrhosis, and HCC s/p TACE and RFA who is listed for liver transplant with MELD 24. He now presents to the hospital for liver transplant. He reports being in his usual state of health. Denies fever/chills. Pre op labs and imaging pending.           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 Replaced LHA, reconstructed. POD#1 liver US satisfactory. H/O A fib resumed home metoprolol and amiodarone, held Warfarin. Stepped down to TSU 1/7. On 1/8, patient converted to A-fib on 1/8 heart rates from 90's 140's with ambulation. Cardiology consulted.    Interval History: No acute events overnight. Cardiology consulted for recurrent afib -> Followed recs and increased Toprol to 50 BID 1/9 then 100 mg daily 1/10. Cont Amio at current dose (200 mg daily). Rate better controlled 1/10. No plan for cardioversion at this time, per cards. Plan to resume Eliquis 1/11. Patient is tolerating diet, ambulating, +flatus -BM, pain well controlled. Sutured medial drain site that had been leaking. Cr stable. LFTs trending down. Plan for liver US in AM with possible dc to follow (POD#5).  Monitor.      Scheduled Meds:   amiodarone  200 mg Oral Daily    docusate sodium  100 mg Oral TID    famotidine  20 mg Oral Nightly    heparin (porcine)  5,000 Units Subcutaneous Q8H    insulin aspart U-100  4-8 Units Subcutaneous TIDWM    insulin detemir U-100  28 Units Subcutaneous Daily    [START ON 1/11/2022] methylPREDNISolone sodium succinate injection  40 mg Intravenous Daily    Followed by    [START ON 1/12/2022] predniSONE  20 mg Oral Daily    metoprolol succinate  100 mg Oral Daily    mupirocin  1 g Nasal BID    mycophenolate  1,000 mg Oral BID    nystatin  500,000 Units Mouth/Throat TID PC    sertraline  25 mg Oral Daily    [START ON 1/13/2022] sulfamethoxazole-trimethoprim 400-80mg  1 tablet Oral Daily AM    tacrolimus  0.5 mg Oral Once    tacrolimus  1 mg Oral BID    tamsulosin  0.4 mg Oral QHS    [START ON 1/16/2022] valGANciclovir  450 mg Oral Daily     Continuous Infusions:  PRN Meds:dextrose 50%, dextrose 50%, gabapentin, glucagon (human recombinant), glucose, glucose, insulin aspart U-100, oxyCODONE, oxyCODONE, sodium chloride 0.9%    Review of Systems   Constitutional: Negative for chills and fever.   HENT: Negative for facial swelling and trouble swallowing.    Eyes: Negative for photophobia and visual disturbance.   Respiratory: Negative for cough, shortness of breath, wheezing and stridor.    Cardiovascular: Negative for chest pain, palpitations and leg swelling.   Gastrointestinal: Positive for abdominal pain. Negative for abdominal distention, constipation, diarrhea, nausea and vomiting.   Genitourinary: Negative for difficulty urinating and dysuria.   Musculoskeletal: Negative for arthralgias.   Skin: Positive for wound.   Allergic/Immunologic: Positive for immunocompromised state.   Neurological: Negative for dizziness and tremors.   Hematological: Negative for adenopathy. Does not bruise/bleed easily.   Psychiatric/Behavioral: Positive for dysphoric mood. Negative for  agitation, behavioral problems, confusion and decreased concentration.     Objective:     Vital Signs (Most Recent):  Temp: 97.7 °F (36.5 °C) (01/10/22 0735)  Pulse: 76 (01/10/22 0735)  Resp: 16 (01/10/22 0735)  BP: 121/65 (01/10/22 0735)  SpO2: (!) 94 % (01/10/22 0735) Vital Signs (24h Range):  Temp:  [97.7 °F (36.5 °C)-98.6 °F (37 °C)] 97.7 °F (36.5 °C)  Pulse:  [76-91] 76  Resp:  [16-20] 16  SpO2:  [91 %-95 %] 94 %  BP: (113-124)/(60-87) 121/65     Weight: 103.9 kg (229 lb 0.9 oz)  Body mass index is 28.63 kg/m².    Intake/Output - Last 3 Shifts       01/08 0700 01/09 0659 01/09 0700  01/10 0659 01/10 0700  01/11 0659    P.O. 1350 780     I.V. (mL/kg)       IV Piggyback       Total Intake(mL/kg) 1350 (13) 780 (7.5)     Urine (mL/kg/hr) 950 (0.4) 1200 (0.5)     Emesis/NG output  0     Drains 245      Stool 0 0     Total Output 1195 1200     Net +155 -420            Stool Occurrence 0 x 0 x     Emesis Occurrence  0 x           Physical Exam  Vitals and nursing note reviewed.   Constitutional:       General: He is not in acute distress.     Appearance: He is well-developed.   HENT:      Head: Normocephalic.   Eyes:      General: No scleral icterus.        Right eye: No discharge.         Left eye: No discharge.      Conjunctiva/sclera: Conjunctivae normal.   Cardiovascular:      Rate and Rhythm: Normal rate. Rhythm irregular.      Heart sounds: No murmur heard.       Comments: afib  Pulmonary:      Effort: Pulmonary effort is normal. No respiratory distress.      Breath sounds: No wheezing or rales.   Abdominal:      General: Bowel sounds are normal. There is no distension.      Palpations: Abdomen is soft.      Tenderness: There is no abdominal tenderness. There is no guarding.          Comments: Staples CDI   R DANNIELLE drain sites sutured no s/s/i     Musculoskeletal:         General: Normal range of motion.      Cervical back: Normal range of motion.      Right lower leg: No edema.      Left lower leg: No edema.    Skin:     General: Skin is warm and dry.      Capillary Refill: Capillary refill takes less than 2 seconds.      Coloration: Skin is not jaundiced.   Neurological:      Mental Status: He is alert and oriented to person, place, and time.   Psychiatric:         Behavior: Behavior normal.         Thought Content: Thought content normal.         Judgment: Judgment normal.         Laboratory:  Immunosuppressants         Stop Route Frequency     tacrolimus capsule 1 mg         -- Oral 2 times daily     tacrolimus capsule 0.5 mg         -- Oral Once     mycophenolate capsule 1,000 mg         -- Oral 2 times daily        CBC:   Recent Labs   Lab 01/10/22  0605   WBC 15.47*   RBC 4.16*   HGB 12.6*   HCT 37.7*      MCV 91   MCH 30.3   MCHC 33.4     BMP:   Recent Labs   Lab 01/10/22  0605   *   *   K 4.3   CL 97   CO2 29   BUN 54*   CREATININE 1.1   CALCIUM 8.9     CMP:   Recent Labs   Lab 01/10/22  0605   *   CALCIUM 8.9   ALBUMIN 3.3*   PROT 5.7*   *   K 4.3   CO2 29   CL 97   BUN 54*   CREATININE 1.1   ALKPHOS 71   *   AST 95*   BILITOT 1.1*     Coagulation:   Recent Labs   Lab 01/10/22  0605   INR 1.1   APTT 22.5     Labs within the past 24 hours have been reviewed.    Diagnostic Results:  US Liver Transplant Post:  Results for orders placed during the hospital encounter of 01/05/22    US Doppler Liver Transplant Post (xpd)    Narrative  EXAMINATION:  US DOPPLER LIVER TRANSPLANT POST (XPD)    CLINICAL HISTORY:  Assess perfusion following liver transplant;    TECHNIQUE:  Limited abdominal ultrasound of the transplant liver with Doppler evaluation.  Color and spectral Doppler were performed.    COMPARISON:  None.    FINDINGS:  Patient is status post orthotopic liver transplant 01/06/2022.  Liver allograft is normal in size.  The liver demonstrates homogeneous echotexture.  No focal hepatic lesions are seen.  No fluid collections.    The common duct is not dilated, measuring 3 mm.  No  "dilated intrahepatic radicles are seen.    Color flow and spectral waveform analysis was performed.  The main portal vein, right portal vein, left portal vein, middle hepatic vein, right hepatic vein, left hepatic vein, and IVC are patent with proper directional flow.    Anastomosis site of the main hepatic artery demonstrates a peak systolic velocity 103 cm/sec.    Main hepatic artery demonstrates resistive index 0.82 with normal waveform.    Left hepatic artery shows resistive index 0.71 with normal waveform.    Anterior branch of the right hepatic artery demonstrates resistive index 0.76 with normal waveform.    Posterior branch of the right hepatic artery demonstrates resistive index 0.81 with normal waveform.    Impression  Satisfactory Doppler evaluation of liver allograft.    Findings were discussed with Dr. Munoz by Dr. Alvarenga at approximately 08:10 a.m. on 01/07/2022.    Electronically signed by resident: David Meléndez  Date:    01/07/2022  Time:    06:28    Electronically signed by: Kris Alvarenga MD  Date:    01/07/2022  Time:    08:12    Debility/Functional status: Patient debilitated by evidence of Weakness. Physical and occupational therapy ordered daily to evaluate and treat. Debility was: present on admission.    Assessment/Plan:     Acute blood loss anemia  - Expected post liver transplant + anemia of chronic disease (ESLD)  - H/H stable.  - Monitor with daily cbc.      Anemia of chronic disease  - See "acute blood loss anemia"      Adverse effect of adrenal cortical steroids, sequela  - Monitor.      Prophylactic immunotherapy  - Continue Prograf.  - Continue to monitor for toxic side effects and check daily levels. Will adjust for therapeutic dose.  - Continue Cellcept.   - Continue steroids.      At risk for opportunistic infections  - Bactrim for PCP ppx.  - Valcyte for CMV ppx.  - Nystatin for thrush ppx.    S/P liver transplant  - S/p DBD LT 1/6 for ESLD 2/2 CASTILLO/HCC. Replaced LHA, " reconstructed.  - POD#1 US satisfactory  - Tolerating diet, ambulating, +flatus -BM, pain well controlled.  - Cont bowel regimen and encourage ambulation.  - Drains and irwin out.  - LFTs trending down.   - Plan for liver US 1/11 with possible dc to follow (POD#5).   - Monitor    Atrial fibrillation  - H/O A fib restarted Hhome metoprolol and amiodarone (holding Coumadin).   - Initial plan was for Eliquis 1/10   - Converted to A-fib on 1/8  - Cardiology consulted for recurrent afib  -> Followed recs and increased Toprol to 50 BID 1/9 then 100 mg daily 1/10. Cont Amio at current dose (200 mg daily).  - Rate better controlled 1/10. No plan for cardioversion at this time, per cards.   - Plan to resume Eliquis 1/11.   - Will need outpatient cards/EP f/u.  - Monitor          Type 2 diabetes mellitus  - Endocrine following patient, appreciate rec's   - Monitor           VTE Risk Mitigation (From admission, onward)         Ordered     heparin (porcine) injection 5,000 Units  Every 8 hours         01/06/22 1314     IP VTE HIGH RISK PATIENT  Once         01/06/22 1314                The patients clinical status was discussed at multidisplinary rounds, involving transplant surgery, transplant medicine, pharmacy, nursing, nutrition, and social work    Discharge Planning:  Tentative dc tomorrow, 1/11.    Blanche Shafer PA-C  Liver Transplant  [facility]

## 2022-01-10 NOTE — TELEPHONE ENCOUNTER
Spoke with patient to schedule teaching appt at 2 pm. Informed patient that attempted to call SO and unable to LVM.  He stated she would be coming to the hospital.

## 2022-01-10 NOTE — PROGRESS NOTES
EDUCATION NOTE:    Met with Tej STUART Hungdelfina and his caregivers to provide teaching re: immunosuppressant medications.  Reviewed medication section of the Liver Transplant Education book that was provided.  Emphasized the importance of compliance, role of the blue medication card, concerns for drug interactions, and process of obtaining refills.  Counseled regarding Prograf, Cellcept , prednisone, including directions for use, monitoring, how to handle missed doses, and side effects.  Mr. Purdy and his wife verbalized understanding and had the opportunity to ask questions.

## 2022-01-10 NOTE — ASSESSMENT & PLAN NOTE
66 year old male with PMHx of CASTILLO cirrhosis, HTN, HLD, paroxysmal atrial fibrillation, DM2 who [place] [facility] [place]. Did well in surgery however went back into atrial fib with RVR on 1/8/22.      Atrial fibrillation with RVR:   S/p multiple cardioversions, previously failed flecainide and most recently on amiodarone. Was doing well over the last few months but had recurrence in the post op setting. Given multiple failed cardioversions in the past, will attempt rate control for the time being.    Plan/recs:  -Continue metoprolol 100 mg daily  -Continue amiodarone at current dose  -Restart anticoagulation when primary team comfortable (Eliquis preferred, pt has been on in past).  -EP outpatient f/u to consider ablation therapy         Thank you for the consult. Cardiology will sign off at this time.

## 2022-01-10 NOTE — ASSESSMENT & PLAN NOTE
- Continue Prograf.  - Continue to monitor for toxic side effects and check daily levels. Will adjust for therapeutic dose.  - Continue Cellcept.   - Continue steroids.

## 2022-01-10 NOTE — DISCHARGE SUMMARY
[facility]  Liver Transplant  Discharge Summary      Patient Name: Tej Purdy  MRN: [id number]  Admission Date: 1/5/2022  Hospital Length of Stay: 8 days  Discharge Date and Time:  01/13/2022 12:09 PM  Attending Physician: Gus Campos MD   Discharging Provider: Blanche Shafer PA-C  Primary Care Provider: Thais Maxwell MD     Post-Operative Day: 7     ORGAN:   LIVER  Disease Etiology: Primary Liver Malignancy: Hepatoma (HCC) and Cirrhosis  Donor Type:   Donation after Brain Death  CDC High Risk:   No  Donor CMV Status:   Donor CMV Status: Positive  Donor HBcAB:   Negative  Donor HCV Status:   Negative  Whole or Partial: Whole Liver  Biliary Anastomosis: End to End  Arterial Anatomy: Replaced Left Hepatic from Left Gastric    HPI:   Mr Tej Purdy is a 67 y/o M with Afib (on coumadin), CASTILLO cirrhosis, and HCC s/p TACE and RFA who is listed for liver transplant with MELD 24. He now presents to the hospital for liver transplant. He reports being in his usual state of health. Denies fever/chills. Pre op labs and imaging pending.           Procedure(s) (LRB):  TRANSPLANT, LIVER (N/A)     Hospital Course:    Mr. 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 Operation notable for replaced LHA, reconstructed. POD#1 liver US satisfactory. LFTs have trended down daily post operatively. Drains and irwin are out. POD#5 US 1/11 satisfactory.    Patient has a h/o A fib. Warfarin held and home Metoprolol and Amiodarone resumed post op. Stepped down to TSU 1/7. On 1/8, patient converted to A-fib on 1/8 heart rates from 90's 140's with ambulation. Cardiology consulted. Followed recs and increased Toprol to 50 BID 1/9 then 100 mg daily 1/10. Continued Amiodarone at same dose (200 mg daily). Eliquis resumed 1/11. He converted to sinus cornel 1/11 with HR dipping sustaining in the 40s + lightheadedness with standing. Hydrated with albumin and decreased Toprol dose. HR remained too low to  receive dose. Stopped beta blocker on 1/12 per cardiology recommendation (d/w cards on phone). Patient's HR improved to 60s, but BP increased as well. Amlodipine started for BP control on 1/13 AM with improvement noted. Patient needs to follow up with cards/EP outpatient.    Patient c/o heartburn/indigestion post transplant. Switched Pepsid to Protonix and gave PRN tums/maalox. Also gave suppository on 1/11 at which time patient had his first post op BM. He refused another suppository on 1/12. He is passing flatus. Senna added to bowel regimen on 1/13. Pt reported feeling better 1/13 PM prior to discharge.    Patient is stable and ready for discharge at this time. He is tolerating diet, ambulating, +flatus +BM, pain well controlled. Follow up to include labs and transplant clinic appointment tomorrow, 1/14/22. Patient is in agreement with discharge from the hospital today and follow up plan.    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] 01/06/2022 Not Applicable    Type 2 diabetes mellitus [E11.9] 01/19/2021 Yes      Problems Resolved During this Admission:    Diagnosis Date Noted Date Resolved POA    Acute blood loss anemia [D62] 01/10/2022 01/13/2022 Yes    Encounter for weaning from ventilator [Z99.11]  01/08/2022 Not Applicable    Surgical wound present [T14.8XXA] 01/06/2022 01/08/2022 No    End stage liver disease [K72.10] 01/05/2022 01/08/2022 Yes    Atrial fibrillation with RVR [I48.91] 05/19/2021 01/13/2022 Yes       Consults (From admission, onward)        Status Ordering  Provider     Inpatient consult to Cardiology  Once        Provider:  (Not yet assigned)    Completed CEASR CHAPMAN     Inpatient consult to Endocrinology  Once        Provider:  (Not yet assigned)    Completed SHARON MONTES DE OCA     Inpatient consult to Registered Dietitian/Nutritionist  Once        Provider:  (Not yet assigned)    Completed SHARON MONTES DE OCA          Pending Diagnostic Studies:     Procedure Component Value Units Date/Time    AST (SGOT) [[id number]] Collected: 01/07/22 2021    Order Status: Sent Lab Status: In process Updated: 01/07/22 2021    Specimen: Blood     CBC auto differential [[id number]] Collected: 01/07/22 2021    Order Status: Sent Lab Status: In process Updated: 01/07/22 2021    Specimen: Blood     Specimen to Pathology, Surgery Liver [[id number]] Collected: 01/06/22 1226    Order Status: Sent Lab Status: In process Updated: 01/06/22 1556        Significant Diagnostic Studies: Labs:   CMP   Recent Labs   Lab 01/12/22  0650 01/13/22  0635    134*   K 4.3 4.2    101   CO2 29 24   * 136*   BUN 42* 41*   CREATININE 1.1 0.9   CALCIUM 9.1 8.6*   PROT 5.8* 5.1*   ALBUMIN 3.4* 2.9*   BILITOT 1.1* 0.9   ALKPHOS 60 54*   AST 42* 31   * 114*   ANIONGAP 9 9   ESTGFRAFRICA >60.0 >60.0   EGFRNONAA >60.0 >60.0   , CBC   Recent Labs   Lab 01/12/22  0650 01/12/22  0650 01/13/22  0635   WBC 9.05  --  9.70   HGB 11.8*  --  12.2*   HCT 35.9*   < > 36.9*   *  --  109*    < > = values in this interval not displayed.    and INR   Lab Results   Component Value Date    INR 1.2 01/13/2022    INR 1.2 01/12/2022    INR 1.2 01/11/2022       The patients clinical status was discussed at multidisplinary rounds, involving transplant surgery, transplant medicine, pharmacy, nursing, nutrition, and social work    Discharged Condition: stable    Disposition: Home or Self CareDc home    Follow Up: See above    Patient Instructions:      Diet diabetic     Notify your health care provider if you  experience any of the following:  increased confusion or weakness     Notify your health care provider if you experience any of the following:  persistent dizziness, light-headedness, or visual disturbances     Notify your health care provider if you experience any of the following:  worsening rash     Notify your health care provider if you experience any of the following:  severe persistent headache     Notify your health care provider if you experience any of the following:  difficulty breathing or increased cough     Notify your health care provider if you experience any of the following:  redness, tenderness, or signs of infection (pain, swelling, redness, odor or green/yellow discharge around incision site)     Notify your health care provider if you experience any of the following:  severe uncontrolled pain     Notify your health care provider if you experience any of the following:  temperature >100.4     Notify your health care provider if you experience any of the following:  persistent nausea and vomiting or diarrhea     Activity as tolerated     Medications:  Reconciled Home Medications:      Medication List      START taking these medications    amLODIPine 10 MG tablet  Commonly known as: NORVASC  Take 1 tablet (10 mg total) by mouth once daily.  Start taking on: January 14, 2022     apixaban 5 mg Tab  Commonly known as: ELIQUIS  Take 1 tablet (5 mg total) by mouth 2 (two) times daily.     calcium carbonate 200 mg calcium (500 mg) chewable tablet  Commonly known as: TUMS  Take 1 tablet (500 mg total) by mouth 3 (three) times daily as needed for Heartburn.     calcium carbonate-vitamin D3 600 mg-20 mcg (800 unit) Tab  Take 1 tablet by mouth once daily.     HumaLOG KwikPen Insulin 100 unit/mL pen  Generic drug: insulin lispro  Inject 6 Units into the skin 3 (three) times daily. Plus sliding scale, MDD: 48 units     mycophenolate 250 mg Cap  Commonly known as: CELLCEPT  Take 4 capsules (1,000 mg total) by  mouth 2 (two) times daily.     nystatin 100,000 unit/mL suspension  Commonly known as: MYCOSTATIN  Take 5 mLs (500,000 Units total) by mouth 3 (three) times daily after meals. STOP 1/26/22     oxyCODONE 10 mg Tab immediate release tablet  Commonly known as: ROXICODONE  Take 1 tablet (10 mg total) by mouth every 4 (four) hours as needed for Pain.     predniSONE 5 MG tablet  Commonly known as: DELTASONE  Take by mouth daily:  20mg 1/12-1/18, 15mg 1/19-1/25, 10mg 1/26-2/1, 5mg 2/2-2/8, STOP 2/9/22     sulfamethoxazole-trimethoprim 400-80mg 400-80 mg per tablet  Commonly known as: BACTRIM,SEPTRA  Take 1 tablet by mouth every morning. STOP 7/7/22     tacrolimus 1 MG Cap  Commonly known as: PROGRAF  Take 2 capsules (2 mg total) by mouth every 12 (twelve) hours.     valGANciclovir 450 mg Tab  Commonly known as: VALCYTE  Take 1 tablet (450 mg total) by mouth once daily. STOP 7/7/22  Start taking on: January 16, 2022        CHANGE how you take these medications    LANTUS SOLOSTAR U-100 INSULIN glargine 100 units/mL (3mL) SubQ pen  Generic drug: insulin  Inject 24 Units into the skin every evening.  What changed:   · how much to take  · additional instructions     pantoprazole 40 MG tablet  Commonly known as: PROTONIX  Take 1 tablet (40 mg total) by mouth once daily.  What changed:   · medication strength  · how much to take        CONTINUE taking these medications    amiodarone 200 MG Tab  Commonly known as: PACERONE  Take 1 tablet (200 mg total) by mouth once daily.     aspirin 81 MG EC tablet  Commonly known as: ECOTRIN  Take 81 mg by mouth once daily.     blood sugar diagnostic Strp  To check BG 2 times daily, to use with insurance preferred meter     blood-glucose meter kit  To check BG 2 times daily, to use with insurance preferred meter     fish oil-omega-3 fatty acids 300-1,000 mg capsule  Take 1 capsule by mouth once daily.     gabapentin 300 MG capsule  Commonly known as: NEURONTIN  Take 1 capsule (300 mg total) by  "mouth 3 (three) times daily as needed (neuropathy).     lancets Misc  To check BG 2 times daily, to use with insurance preferred meter     multivitamin capsule  Take 1 capsule by mouth once daily.     pen needle, diabetic 32 gauge x 5/32" Ndle  Commonly known as: BD ULTRA-FINE GÉNESIS PEN NEEDLE  Use with insulin once daily     sertraline 25 MG tablet  Commonly known as: ZOLOFT  Take 1 tablet (25 mg total) by mouth once daily.     tamsulosin 0.4 mg Cap  Commonly known as: FLOMAX  Take 1 capsule (0.4 mg total) by mouth every evening.        STOP taking these medications    lactulose 10 gram/15 mL solution  Commonly known as: CHRONULAC     lisinopriL 20 MG tablet  Commonly known as: PRINIVIL,ZESTRIL     lovastatin 20 MG tablet  Commonly known as: MEVACOR     metFORMIN 1000 MG tablet  Commonly known as: GLUCOPHAGE     metoprolol succinate 25 MG 24 hr tablet  Commonly known as: TOPROL-XL     oxyCODONE-acetaminophen 5-325 mg per tablet  Commonly known as: PERCOCET     rifAXIMin 550 mg Tab  Commonly known as: XIFAXAN     warfarin 5 MG tablet  Commonly known as: COUMADIN     zolpidem 5 MG Tab  Commonly known as: AMBIEN          Time spent caring for patient (Greater than 1/2 spent in direct face-to-face contact): > 30 minutes    Blanche Shafer PA-C  Liver Transplant  [facility]  "

## 2022-01-10 NOTE — ASSESSMENT & PLAN NOTE
- Expected post liver transplant + anemia of chronic disease (ESLD)  - H/H stable.  - Monitor with daily cbc.

## 2022-01-10 NOTE — ASSESSMENT & PLAN NOTE
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 Replaced LHA, reconstructed.  - POD#1 US satisfactory  - Tolerating diet, ambulating, +flatus -BM, pain well controlled.  - Cont bowel regimen and encourage ambulation.  - Drains and irwin out.  - LFTs trending down.   - Plan for liver US 1/11 with possible dc to follow (POD#5).   - Monitor

## 2022-01-10 NOTE — HPI
HPI  Mr Tej Purdy is a 66 year old male with PMHx of CASTILLO cirrhosis, HTN, HLD, paroxysmal atrial fibrillation, DM2 who presented to [facility] for liver transplant. He underwent surgery on 1/6 without apparent complication. Had been doing well post operatively, extubated on 1/7 and stepped down to floor. Tolerating diet, however on afternoon of 1/8 he developed atrial fibrillation with RVR. Cardiology was consulted for further recommendations.      Regarding his cardiac history, afib was first diagnosed around 5/2021. He follows with Dr. Tilley. He was initially cardioverted and placed on flecainide for rhythm control, and anticoagulated with warfarin. He had several recurrences of his atrial fib requiring multiple repeat cardioversions, last one 7/27/21. He was transitioned from flecainide to amiodarone prior to that. He is often asymptomatic when in AF but hasn't had any documented recurrences since then, or at least none of clinical significance. He has required hospitalization for RVR and acute pulmonary edema in the past.      Review of telemetry shows he went into AF ~12:45 on afternoon of 1/8. He has been continued on his home toprol 50 mg daily and amiodarone 200 mg daily. Anticoagulation currently held in the perioperative period. His rates have ranged ~100-130 over the past 24 hours. He has remained hemodynamically stable. Largely asymptomatic but reports he felt weak and lightheaded briefly yesterday, no symptoms today.

## 2022-01-10 NOTE — SUBJECTIVE & OBJECTIVE
Scheduled Meds:   amiodarone  200 mg Oral Daily    docusate sodium  100 mg Oral TID    famotidine  20 mg Oral Nightly    heparin (porcine)  5,000 Units Subcutaneous Q8H    insulin aspart U-100  4-8 Units Subcutaneous TIDWM    insulin detemir U-100  28 Units Subcutaneous Daily    [START ON 1/11/2022] methylPREDNISolone sodium succinate injection  40 mg Intravenous Daily    Followed by    [START ON 1/12/2022] predniSONE  20 mg Oral Daily    metoprolol succinate  100 mg Oral Daily    mupirocin  1 g Nasal BID    mycophenolate  1,000 mg Oral BID    nystatin  500,000 Units Mouth/Throat TID PC    sertraline  25 mg Oral Daily    [START ON 1/13/2022] sulfamethoxazole-trimethoprim 400-80mg  1 tablet Oral Daily AM    tacrolimus  0.5 mg Oral Once    tacrolimus  1 mg Oral BID    tamsulosin  0.4 mg Oral QHS    [START ON 1/16/2022] valGANciclovir  450 mg Oral Daily     Continuous Infusions:  PRN Meds:dextrose 50%, dextrose 50%, gabapentin, glucagon (human recombinant), glucose, glucose, insulin aspart U-100, oxyCODONE, oxyCODONE, sodium chloride 0.9%    Review of Systems   Constitutional: Negative for chills and fever.   HENT: Negative for facial swelling and trouble swallowing.    Eyes: Negative for photophobia and visual disturbance.   Respiratory: Negative for cough, shortness of breath, wheezing and stridor.    Cardiovascular: Negative for chest pain, palpitations and leg swelling.   Gastrointestinal: Positive for abdominal pain. Negative for abdominal distention, constipation, diarrhea, nausea and vomiting.   Genitourinary: Negative for difficulty urinating and dysuria.   Musculoskeletal: Negative for arthralgias.   Skin: Positive for wound.   Allergic/Immunologic: Positive for immunocompromised state.   Neurological: Negative for dizziness and tremors.   Hematological: Negative for adenopathy. Does not bruise/bleed easily.   Psychiatric/Behavioral: Positive for dysphoric mood. Negative for agitation,  behavioral problems, confusion and decreased concentration.     Objective:     Vital Signs (Most Recent):  Temp: 97.7 °F (36.5 °C) (01/10/22 0735)  Pulse: 76 (01/10/22 0735)  Resp: 16 (01/10/22 0735)  BP: 121/65 (01/10/22 0735)  SpO2: (!) 94 % (01/10/22 0735) Vital Signs (24h Range):  Temp:  [97.7 °F (36.5 °C)-98.6 °F (37 °C)] 97.7 °F (36.5 °C)  Pulse:  [76-91] 76  Resp:  [16-20] 16  SpO2:  [91 %-95 %] 94 %  BP: (113-124)/(60-87) 121/65     Weight: 103.9 kg (229 lb 0.9 oz)  Body mass index is 28.63 kg/m².    Intake/Output - Last 3 Shifts       01/08 0700  01/09 0659 01/09 0700  01/10 0659 01/10 0700  01/11 0659    P.O. 1350 780     I.V. (mL/kg)       IV Piggyback       Total Intake(mL/kg) 1350 (13) 780 (7.5)     Urine (mL/kg/hr) 950 (0.4) 1200 (0.5)     Emesis/NG output  0     Drains 245      Stool 0 0     Total Output 1195 1200     Net +155 -420            Stool Occurrence 0 x 0 x     Emesis Occurrence  0 x           Physical Exam  Vitals and nursing note reviewed.   Constitutional:       General: He is not in acute distress.     Appearance: He is well-developed.   HENT:      Head: Normocephalic.   Eyes:      General: No scleral icterus.        Right eye: No discharge.         Left eye: No discharge.      Conjunctiva/sclera: Conjunctivae normal.   Cardiovascular:      Rate and Rhythm: Normal rate. Rhythm irregular.      Heart sounds: No murmur heard.       Comments: afib  Pulmonary:      Effort: Pulmonary effort is normal. No respiratory distress.      Breath sounds: No wheezing or rales.   Abdominal:      General: Bowel sounds are normal. There is no distension.      Palpations: Abdomen is soft.      Tenderness: There is no abdominal tenderness. There is no guarding.          Comments: Staples CDI   R DANNIELLE drain sites sutured no s/s/i     Musculoskeletal:         General: Normal range of motion.      Cervical back: Normal range of motion.      Right lower leg: No edema.      Left lower leg: No edema.   Skin:      General: Skin is warm and dry.      Capillary Refill: Capillary refill takes less than 2 seconds.      Coloration: Skin is not jaundiced.   Neurological:      Mental Status: He is alert and oriented to person, place, and time.   Psychiatric:         Behavior: Behavior normal.         Thought Content: Thought content normal.         Judgment: Judgment normal.         Laboratory:  Immunosuppressants         Stop Route Frequency     tacrolimus capsule 1 mg         -- Oral 2 times daily     tacrolimus capsule 0.5 mg         -- Oral Once     mycophenolate capsule 1,000 mg         -- Oral 2 times daily        CBC:   Recent Labs   Lab 01/10/22  0605   WBC 15.47*   RBC 4.16*   HGB 12.6*   HCT 37.7*      MCV 91   MCH 30.3   MCHC 33.4     BMP:   Recent Labs   Lab 01/10/22  0605   *   *   K 4.3   CL 97   CO2 29   BUN 54*   CREATININE 1.1   CALCIUM 8.9     CMP:   Recent Labs   Lab 01/10/22  0605   *   CALCIUM 8.9   ALBUMIN 3.3*   PROT 5.7*   *   K 4.3   CO2 29   CL 97   BUN 54*   CREATININE 1.1   ALKPHOS 71   *   AST 95*   BILITOT 1.1*     Coagulation:   Recent Labs   Lab 01/10/22  0605   INR 1.1   APTT 22.5     Labs within the past 24 hours have been reviewed.    Diagnostic Results:  US Liver Transplant Post:  Results for orders placed during the hospital encounter of 01/05/22    US Doppler Liver Transplant Post (xpd)    Narrative  EXAMINATION:  US DOPPLER LIVER TRANSPLANT POST (XPD)    CLINICAL HISTORY:  Assess perfusion following liver transplant;    TECHNIQUE:  Limited abdominal ultrasound of the transplant liver with Doppler evaluation.  Color and spectral Doppler were performed.    COMPARISON:  None.    FINDINGS:  Patient is status post orthotopic liver transplant 01/06/2022.  Liver allograft is normal in size.  The liver demonstrates homogeneous echotexture.  No focal hepatic lesions are seen.  No fluid collections.    The common duct is not dilated, measuring 3 mm.  No dilated  intrahepatic radicles are seen.    Color flow and spectral waveform analysis was performed.  The main portal vein, right portal vein, left portal vein, middle hepatic vein, right hepatic vein, left hepatic vein, and IVC are patent with proper directional flow.    Anastomosis site of the main hepatic artery demonstrates a peak systolic velocity 103 cm/sec.    Main hepatic artery demonstrates resistive index 0.82 with normal waveform.    Left hepatic artery shows resistive index 0.71 with normal waveform.    Anterior branch of the right hepatic artery demonstrates resistive index 0.76 with normal waveform.    Posterior branch of the right hepatic artery demonstrates resistive index 0.81 with normal waveform.    Impression  Satisfactory Doppler evaluation of liver allograft.    Findings were discussed with Dr. Munoz by Dr. Alvarenga at approximately 08:10 a.m. on 01/07/2022.    Electronically signed by resident: David Meléndez  Date:    01/07/2022  Time:    06:28    Electronically signed by: Kris Alvarenga MD  Date:    01/07/2022  Time:    08:12    Debility/Functional status: Patient debilitated by evidence of Weakness. Physical and occupational therapy ordered daily to evaluate and treat. Debility was: present on admission.

## 2022-01-10 NOTE — PROGRESS NOTES
[facility] Stepdown  Adult Nutrition  Progress Note    SUMMARY       Recommendations    1. Continue Diabetic diet     2. If PO intake <50%, add Boost Glucose Control BID     3. Intensify bowel regimen prn (LBM 1/5)     4. Post-transplant nutrition education provided 1/10    Goals: Pt to meet % EEN/EPN over the course of 7 days  Nutrition Goal Status: progressing towards goal    Communication of RD Recs: reviewed with RN    Assessment and Plan    Nutrition Problem  Increased nutrient needs, protein    Related to (etiology):   Physiological demands, healing    Signs and Symptoms (as evidenced by):   S/p OLTx on 1/6    Interventions/Recommendations (treatment strategy):  Collaboration with other providers  Nutrition education    Nutrition Diagnosis Status:   Continues     Malnutrition Assessment       Orbital Region (Subcutaneous Fat Loss): well nourished  Upper Arm Region (Subcutaneous Fat Loss): well nourished   [place] Region (Muscle Loss): well nourished  Clavicle Bone Region (Muscle Loss): well nourished  Clavicle and Acromion Bone Region (Muscle Loss): well nourished  Scapular Bone Region (Muscle Loss): well nourished  Dorsal Hand (Muscle Loss): well nourished   Edema (Fluid Accumulation): 2-->mild   Subcutaneous Fat Loss (Final Summary): well nourished  Muscle Loss Evaluation (Final Summary): well nourished         Reason for Assessment    Reason For Assessment: RD follow-up  Diagnosis:  (S/p OLTx)  Relevant Medical History: Afib , CASTILLO cirrhosis, HCC s/p TACE and RFA, DM2, HTN, HLD  Interdisciplinary Rounds: did not attend    General Information Comments: S/p OLTx on 1/6. Pt reports good appetite since surgery tolerating 25-75% PO intake. Good appetite PTA following Low Na diet at home. Pt reports some nausea but denies v/d/c or difficulties chewing/swallowing. Reports constipation (LBM 1/5) and no gas yet, but hiccups. # per pt; no recent wt loss noted. NFPE completed 1/10; pt appears  "nourished with no s/s of malnutrition.    Nutrition Discharge Planning: Post-transplant nutrition education provided on 1/10. Food safety/drug interactions emphasized. General heatlhy/low salt diet recommended. Education material left. No other needs identified.    Nutrition Risk Screen    Nutrition Risk Screen: no indicators present    Nutrition/Diet History    Patient Reported Diet/Restrictions/Preferences: low salt  Spiritual, Cultural Beliefs, Hoahaoism Practices, Values that Affect Care: no  Food Allergies: NKFA  Factors Affecting Nutritional Intake: None identified at this time    Anthropometrics    Temp: 98.2 °F (36.8 °C)  Height Method: Stated  Height: 6' 3" (190.5 cm)  Height (inches): 75 in  Weight Method: Bed Scale  Weight: 103.9 kg (229 lb 0.9 oz)  Weight (lb): 229.06 lb  Ideal Body Weight (IBW), Male: 196 lb  % Ideal Body Weight, Male (lb): 115.29 %  BMI (Calculated): 28.6  BMI Grade: 25 - 29.9 - overweight  Weight Loss: intentional  Usual Body Weight (UBW), k.45 kg  % Usual Body Weight: 109.08       Lab/Procedures/Meds    Pertinent Labs Reviewed: reviewed  Pertinent Labs Comments: Na 135, BUN 54, Glu 141, Total protein 5.7, Alb 3.3, AST 95,   Pertinent Medications Reviewed: reviewed  Pertinent Medications Comments: amiodorane, senna, famotidine, heparin, insulin, prednisone, mycophenolate, tacrolimus, tamsulosin    Estimated/Assessed Needs    Weight Used For Calorie Calculations: 102.6 kg (226 lb 3.1 oz)  Energy Calorie Requirements (kcal): 2258 kcal/day  Energy Need Method: [place] (PAL 1.2)  Protein Requirements: 103-123 g/day (1.0-1.2 g/kg)  Weight Used For Protein Calculations: 102.6 kg (226 lb 3.1 oz)  Fluid Requirements (mL): 1 mL/kcal or per MD  Estimated Fluid Requirement Method: RDA Method  RDA Method (mL): 2258  CHO Requirement: 282 gm    Nutrition Prescription Ordered    Current Diet Order: Diabetic Diet 2000 kcal    Evaluation of Received Nutrient/Fluid Intake    I/O: " +26 mL since admit  Energy Calories Required: not meeting needs  Protein Required: not meeting needs  Fluid Required: other (see comments) (Per MD)  Comments: LBM 1/5  Tolerance: tolerating  % Intake of Estimated Energy Needs: 50 - 75 %  % Meal Intake: 50 - 75 %    Nutrition Risk    Level of Risk/Frequency of Follow-up: low     Monitor and Evaluation    Food and Nutrient Intake: energy intake,food and beverage intake  Food and Nutrient Adminstration: diet order  Knowledge/Beliefs/Attitudes: food and nutrition knowledge/skill  Physical Activity and Function: nutrition-related ADLs and IADLs  Anthropometric Measurements: weight,weight change  Biochemical Data, Medical Tests and Procedures: electrolyte and renal panel,gastrointestinal profile,glucose/endocrine profile,inflammatory profile,lipid profile  Nutrition-Focused Physical Findings: overall appearance,extremities, muscles and bones     Nutrition Follow-Up    RD Follow-up?: Yes

## 2022-01-10 NOTE — CARE UPDATE
Care Update:     No acute events overnight. Patient on the TSU in room 56763/81415 A. Blood glucose stable. BG at and slightly above goal on current insulin regimen. Steroid use- Solumedrol 80 mg. 4 Days Post-Op  Renal function- Normal  Vasopressors-  None      Diet diabetic [facility]; 2000 Calorie     POCT Glucose   Date Value Ref Range Status   01/10/2022 229 (H) 70 - 110 mg/dL Final   01/10/2022 145 (H) 70 - 110 mg/dL Final   01/09/2022 181 (H) 70 - 110 mg/dL Final   01/09/2022 182 (H) 70 - 110 mg/dL Final   01/09/2022 331 (H) 70 - 110 mg/dL Final   01/09/2022 248 (H) 70 - 110 mg/dL Final   01/08/2022 241 (H) 70 - 110 mg/dL Final   01/08/2022 255 (H) 70 - 110 mg/dL Final   01/08/2022 215 (H) 70 - 110 mg/dL Final   01/08/2022 179 (H) 70 - 110 mg/dL Final   01/08/2022 199 (H) 70 - 110 mg/dL Final   01/07/2022 225 (H) 70 - 110 mg/dL Final   01/07/2022 153 (H) 70 - 110 mg/dL Final     Lab Results   Component Value Date    HGBA1C 5.8 (H) 01/05/2022       Endocrinology consulted for BG management.   BG goal 140-180    - Levemir 28 units qd  - Start Novolog 4-8 TIDWM (Weight-based at 0.5 units/kg/day; started due prandial excursions).   - Start Novolog MDC (150/25) prn for BG excursions.   - BG checks AC/HS    ** Please notify Endocrine for any change and/or advance in diet**  ** Please call Endocrine for any BG related issues **    Discharge Planning:   TBD. Please notify endocrinology prior to discharge.      João Bender DNP, FNP-C  Department of Endocrinology  Inpatient Glycemic Management

## 2022-01-10 NOTE — PROGRESS NOTES
[facility] - [facility] Step[facility]  Cardiology  Progress Note    Patient Name: Tej Purdy  MRN: [id number]  Admission Date: 1/5/2022  Hospital Length of Stay: 5 days  Code Status: Full Code   Attending Physician: Werner Zamarripa MD   Primary Care Physician: Thais Maxwell MD  Expected Discharge Date: 1/13/2022  Principal Problem:End stage liver disease    Subjective:     Hospital Course:   66 y.o m who developed atrial fibrillation with RVR s/p liver tx surgery on 1/6/22. Patient was rate controlled with metoprolol 50mg BID and eventually started on metoprolol 100 mg daily. His home amiodarone 200mg daily was continued. Cardioversion was not done during this admission.       Interval History: Patient sitting in chair, no respiratory distress. Patient with some abdominal pain post liver tx surgery on 1/6. Patient with heart rates in the 60's.    Review of Systems   Constitutional: Negative for decreased appetite and fever.   Cardiovascular: Positive for irregular heartbeat. Negative for chest pain, leg swelling, near-syncope, orthopnea and syncope.   Respiratory: Negative for shortness of breath.    Gastrointestinal: Positive for abdominal pain and flatus.   Neurological: Positive for dizziness and light-headedness.   Psychiatric/Behavioral: Negative for memory loss.     Objective:     Vital Signs (Most Recent):  Temp: 98.2 °F (36.8 °C) (01/10/22 1200)  Pulse: 61 (01/10/22 1200)  Resp: 18 (01/10/22 1200)  BP: 109/61 (01/10/22 1200)  SpO2: (!) 90 % (01/10/22 1200) Vital Signs (24h Range):  Temp:  [97.7 °F (36.5 °C)-98.6 °F (37 °C)] 98.2 °F (36.8 °C)  Pulse:  [] 61  Resp:  [16-20] 18  SpO2:  [90 %-95 %] 90 %  BP: (109-124)/(60-87) 109/61     Weight: 103.9 kg (229 lb 0.9 oz)  Body mass index is 28.63 kg/m².     SpO2: (!) 90 %  O2 Device (Oxygen Therapy): room air      Intake/Output Summary (Last 24 hours) at 1/10/2022 1357  Last data filed at 1/10/2022 0600  Gross per 24 hour   Intake 300 ml   Output 1200 ml   Net  -900 ml       Lines/Drains/Airways     Peripheral Intravenous Line                 Peripheral IV - Single Lumen 01/10/22 1231 22 G Distal;Left;Posterior Forearm <1 day                Physical Exam  Constitutional:       General: He is not in acute distress.     Appearance: He is ill-appearing. He is not toxic-appearing or diaphoretic.   HENT:      Head: Normocephalic.      Right Ear: External ear normal.      Left Ear: External ear normal.   Eyes:      General:         Right eye: No discharge.         Left eye: No discharge.   Cardiovascular:      Rate and Rhythm: Normal rate. Rhythm irregular.      Heart sounds: Murmur heard.       Pulmonary:      Effort: No respiratory distress.      Breath sounds: Normal breath sounds.   Abdominal:      Tenderness: There is abdominal tenderness.   Musculoskeletal:      Right lower leg: No edema.      Left lower leg: No edema.   Skin:     General: Skin is warm and dry.   Neurological:      Mental Status: He is alert.   Psychiatric:         Mood and Affect: Mood normal.         Behavior: Behavior normal.         Significant Labs:      Recent Labs   Lab 01/09/22  0524 01/09/22  1128 01/10/22  0605   *  --  141*   *  --  135*   K 4.5  --  4.3   CL 94*  --  97   CO2 27  --  29   BUN 52*  --  54*   CREATININE 1.4  --  1.1   CALCIUM 8.4*  --  8.9   MG 2.3 2.3 2.2     Recent Labs   Lab 01/09/22  0524 01/10/22  0605   * 135*   K 4.5 4.3   CL 94* 97   CO2 27 29   * 141*   BUN 52* 54*   CREATININE 1.4 1.1   CALCIUM 8.4* 8.9   PROT 5.7* 5.7*   ALBUMIN 3.4* 3.3*   BILITOT 1.0 1.1*   ALKPHOS 67 71   * 95*   * 280*   ANIONGAP 11 9   ESTGFRAFRICA 59.3* >60.0   EGFRNONAA 51.3* >60.0      Recent Labs   Lab 01/09/22  0524 01/09/22  0524 01/10/22  0605   WBC 17.93*  --  15.47*   HGB 12.2*  --  12.6*   HCT 36.8*   < > 37.7*   *  --  151    < > = values in this interval not displayed.     Recent Labs   Lab 01/09/22  0524 01/10/22  0605   INR 1.1 1.1        Significant Imaging: All pertinent imaging from past 24 hours has been reviewed.    Assessment and Plan:         Atrial fibrillation with RVR  66 year old male with PMHx of CASTILLO cirrhosis, HTN, HLD, paroxysmal atrial fibrillation, DM2 who presented to [facility] for liver transplant. Did well in surgery however went back into atrial fib with RVR on 1/8/22.      S/p multiple cardioversions, previously failed flecainide and most recently on amiodarone. Was doing well over the last few months but had recurrence in the post op setting. Given multiple failed cardioversions in the past, will attempt rate control for the time being. Patient responding well to medications and has been rate controlled since episode on 1/8/22.    Plan/recs:  -Continue metoprolol 100 mg daily  -Continue amiodarone at current dose  -Restart anticoagulation when primary team comfortable (Eliquis preferred, pt has been on in past).  -EP outpatient f/u to consider ablation therapy         Thank you for the consult. Cardiology will sign off at this time.       VTE Risk Mitigation (From admission, onward)         Ordered     heparin (porcine) injection 5,000 Units  Every 8 hours         01/06/22 1314     IP VTE HIGH RISK PATIENT  Once         01/06/22 1314                Pradip Dickerson MD  Cardiology  [facility] - [facility]

## 2022-01-10 NOTE — ASSESSMENT & PLAN NOTE
- H/O A fib restarted Hhome metoprolol and amiodarone (holding Coumadin).   - Initial plan was for Eliquis 1/10   - Converted to A-fib on 1/8  - Cardiology consulted for recurrent afib  -> Followed recs and increased Toprol to 50 BID 1/9 then 100 mg daily 1/10. Cont Amio at current dose (200 mg daily).  - Rate better controlled 1/10. No plan for cardioversion at this time, per cards.   - Plan to resume Eliquis 1/11.   - Will need outpatient cards/EP f/u.  - Monitor

## 2022-01-10 NOTE — HOSPITAL COURSE
66 y.o m who developed atrial fibrillation with RVR s/p liver tx surgery on 1/6/22. Patient was rate controlled with metoprolol 50mg BID and eventually started on metoprolol 100 mg daily. His home amiodarone 200mg daily was continued. Cardioversion was not done during this admission.

## 2022-01-10 NOTE — PROGRESS NOTES
Met with patient and his significant other, Aniya, for  discharge teaching.  Reviewed My New Journey: Living Smart After My Liver Transplant.  Sections reviewed were: First Steps, when to call your coordinator, lab and dr Appointments and infection and rejection Prevention, Medication will be reviewed by Pharm D.  Allowed time for questions and answers.  Asked patient to complete the review questions in the discharge book.

## 2022-01-10 NOTE — SUBJECTIVE & OBJECTIVE
Interval History: Patient sitting in chair, no respiratory distress. Patient with some abdominal pain post liver tx surgery on 1/6. Patient with heart rates in the 60's.    Review of Systems   Constitutional: Negative for decreased appetite and fever.   Cardiovascular: Positive for irregular heartbeat. Negative for chest pain, leg swelling, near-syncope, orthopnea and syncope.   Respiratory: Negative for shortness of breath.    Gastrointestinal: Positive for abdominal pain and flatus.   Neurological: Positive for dizziness and light-headedness.   Psychiatric/Behavioral: Negative for memory loss.     Objective:     Vital Signs (Most Recent):  Temp: 98.2 °F (36.8 °C) (01/10/22 1200)  Pulse: 61 (01/10/22 1200)  Resp: 18 (01/10/22 1200)  BP: 109/61 (01/10/22 1200)  SpO2: (!) 90 % (01/10/22 1200) Vital Signs (24h Range):  Temp:  [97.7 °F (36.5 °C)-98.6 °F (37 °C)] 98.2 °F (36.8 °C)  Pulse:  [] 61  Resp:  [16-20] 18  SpO2:  [90 %-95 %] 90 %  BP: (109-124)/(60-87) 109/61     Weight: 103.9 kg (229 lb 0.9 oz)  Body mass index is 28.63 kg/m².     SpO2: (!) 90 %  O2 Device (Oxygen Therapy): room air      Intake/Output Summary (Last 24 hours) at 1/10/2022 1357  Last data filed at 1/10/2022 0600  Gross per 24 hour   Intake 300 ml   Output 1200 ml   Net -900 ml       Lines/Drains/Airways     Peripheral Intravenous Line                 Peripheral IV - Single Lumen 01/10/22 1231 22 G Distal;Left;Posterior Forearm <1 day                Physical Exam  Constitutional:       General: He is not in acute distress.     Appearance: He is ill-appearing. He is not toxic-appearing or diaphoretic.   HENT:      Head: Normocephalic.      Right Ear: External ear normal.      Left Ear: External ear normal.   Eyes:      General:         Right eye: No discharge.         Left eye: No discharge.   Cardiovascular:      Rate and Rhythm: Normal rate. Rhythm irregular.      Heart sounds: Murmur heard.       Pulmonary:      Effort: No respiratory  distress.      Breath sounds: Normal breath sounds.   Abdominal:      Tenderness: There is abdominal tenderness.   Musculoskeletal:      Right lower leg: No edema.      Left lower leg: No edema.   Skin:     General: Skin is warm and dry.   Neurological:      Mental Status: He is alert.   Psychiatric:         Mood and Affect: Mood normal.         Behavior: Behavior normal.         Significant Labs:      Recent Labs   Lab 01/09/22  0524 01/09/22  1128 01/10/22  0605   *  --  141*   *  --  135*   K 4.5  --  4.3   CL 94*  --  97   CO2 27  --  29   BUN 52*  --  54*   CREATININE 1.4  --  1.1   CALCIUM 8.4*  --  8.9   MG 2.3 2.3 2.2     Recent Labs   Lab 01/09/22  0524 01/10/22  0605   * 135*   K 4.5 4.3   CL 94* 97   CO2 27 29   * 141*   BUN 52* 54*   CREATININE 1.4 1.1   CALCIUM 8.4* 8.9   PROT 5.7* 5.7*   ALBUMIN 3.4* 3.3*   BILITOT 1.0 1.1*   ALKPHOS 67 71   * 95*   * 280*   ANIONGAP 11 9   ESTGFRAFRICA 59.3* >60.0   EGFRNONAA 51.3* >60.0      Recent Labs   Lab 01/09/22 0524 01/09/22  0524 01/10/22  0605   WBC 17.93*  --  15.47*   HGB 12.2*  --  12.6*   HCT 36.8*   < > 37.7*   *  --  151    < > = values in this interval not displayed.     Recent Labs   Lab 01/09/22  0524 01/10/22  0605   INR 1.1 1.1       Significant Imaging: All pertinent imaging from past 24 hours has been reviewed.

## 2022-01-11 LAB
ALBUMIN SERPL BCP-MCNC: 2.9 G/DL (ref 3.5–5.2)
ALP SERPL-CCNC: 55 U/L (ref 55–135)
ALT SERPL W/O P-5'-P-CCNC: 186 U/L (ref 10–44)
ANION GAP SERPL CALC-SCNC: 6 MMOL/L (ref 8–16)
APTT BLDCRRT: 22.5 SEC (ref 21–32)
AST SERPL-CCNC: 53 U/L (ref 10–40)
BASOPHILS # BLD AUTO: 0.03 K/UL (ref 0–0.2)
BASOPHILS NFR BLD: 0.4 % (ref 0–1.9)
BILIRUB SERPL-MCNC: 1 MG/DL (ref 0.1–1)
BUN SERPL-MCNC: 45 MG/DL (ref 8–23)
CALCIUM SERPL-MCNC: 8.6 MG/DL (ref 8.7–10.5)
CHLORIDE SERPL-SCNC: 99 MMOL/L (ref 95–110)
CMV IGG SERPL QL IA: REACTIVE
CO2 SERPL-SCNC: 31 MMOL/L (ref 23–29)
CREAT SERPL-MCNC: 1 MG/DL (ref 0.5–1.4)
DIFFERENTIAL METHOD: ABNORMAL
EOSINOPHIL # BLD AUTO: 0.1 K/UL (ref 0–0.5)
EOSINOPHIL NFR BLD: 1.2 % (ref 0–8)
ERYTHROCYTE [DISTWIDTH] IN BLOOD BY AUTOMATED COUNT: 13.9 % (ref 11.5–14.5)
EST. GFR  (AFRICAN AMERICAN): >60 ML/MIN/1.73 M^2
EST. GFR  (NON AFRICAN AMERICAN): >60 ML/MIN/1.73 M^2
GLUCOSE SERPL-MCNC: 97 MG/DL (ref 70–110)
HCT VFR BLD AUTO: 37.7 % (ref 40–54)
HGB BLD-MCNC: 12.3 G/DL (ref 14–18)
IMM GRANULOCYTES # BLD AUTO: 0.19 K/UL (ref 0–0.04)
IMM GRANULOCYTES NFR BLD AUTO: 2.3 % (ref 0–0.5)
INR PPP: 1.2 (ref 0.8–1.2)
LYMPHOCYTES # BLD AUTO: 0.5 K/UL (ref 1–4.8)
LYMPHOCYTES NFR BLD: 6.2 % (ref 18–48)
MAGNESIUM SERPL-MCNC: 1.9 MG/DL (ref 1.6–2.6)
MCH RBC QN AUTO: 30.1 PG (ref 27–31)
MCHC RBC AUTO-ENTMCNC: 32.6 G/DL (ref 32–36)
MCV RBC AUTO: 92 FL (ref 82–98)
MONOCYTES # BLD AUTO: 0.9 K/UL (ref 0.3–1)
MONOCYTES NFR BLD: 10.7 % (ref 4–15)
NEUTROPHILS # BLD AUTO: 6.7 K/UL (ref 1.8–7.7)
NEUTROPHILS NFR BLD: 79.2 % (ref 38–73)
NRBC BLD-RTO: 0 /100 WBC
PHOSPHATE SERPL-MCNC: 2.8 MG/DL (ref 2.7–4.5)
PLATELET # BLD AUTO: 105 K/UL (ref 150–450)
PMV BLD AUTO: 11.4 FL (ref 9.2–12.9)
POCT GLUCOSE: 114 MG/DL (ref 70–110)
POCT GLUCOSE: 155 MG/DL (ref 70–110)
POCT GLUCOSE: 186 MG/DL (ref 70–110)
POCT GLUCOSE: 202 MG/DL (ref 70–110)
POTASSIUM SERPL-SCNC: 4.2 MMOL/L (ref 3.5–5.1)
PROT SERPL-MCNC: 5.3 G/DL (ref 6–8.4)
PROTHROMBIN TIME: 12.6 SEC (ref 9–12.5)
RBC # BLD AUTO: 4.09 M/UL (ref 4.6–6.2)
SODIUM SERPL-SCNC: 136 MMOL/L (ref 136–145)
TACROLIMUS BLD-MCNC: 4.4 NG/ML (ref 5–15)
WBC # BLD AUTO: 8.42 K/UL (ref 3.9–12.7)

## 2022-01-11 PROCEDURE — 25000003 PHARM REV CODE 250: Performed by: NURSE PRACTITIONER

## 2022-01-11 PROCEDURE — 25000003 PHARM REV CODE 250: Performed by: STUDENT IN AN ORGANIZED HEALTH CARE EDUCATION/TRAINING PROGRAM

## 2022-01-11 PROCEDURE — 25000003 PHARM REV CODE 250: Performed by: PHYSICIAN ASSISTANT

## 2022-01-11 PROCEDURE — 99233 PR SUBSEQUENT HOSPITAL CARE,LEVL III: ICD-10-PCS | Mod: 24,,, | Performed by: PHYSICIAN ASSISTANT

## 2022-01-11 PROCEDURE — 84100 ASSAY OF PHOSPHORUS: CPT | Performed by: PHYSICIAN ASSISTANT

## 2022-01-11 PROCEDURE — 85610 PROTHROMBIN TIME: CPT | Performed by: STUDENT IN AN ORGANIZED HEALTH CARE EDUCATION/TRAINING PROGRAM

## 2022-01-11 PROCEDURE — 80053 COMPREHEN METABOLIC PANEL: CPT | Performed by: STUDENT IN AN ORGANIZED HEALTH CARE EDUCATION/TRAINING PROGRAM

## 2022-01-11 PROCEDURE — 97116 GAIT TRAINING THERAPY: CPT | Mod: CQ

## 2022-01-11 PROCEDURE — 63600175 PHARM REV CODE 636 W HCPCS: Performed by: STUDENT IN AN ORGANIZED HEALTH CARE EDUCATION/TRAINING PROGRAM

## 2022-01-11 PROCEDURE — 85025 COMPLETE CBC W/AUTO DIFF WBC: CPT | Performed by: STUDENT IN AN ORGANIZED HEALTH CARE EDUCATION/TRAINING PROGRAM

## 2022-01-11 PROCEDURE — 99233 SBSQ HOSP IP/OBS HIGH 50: CPT | Mod: 24,,, | Performed by: PHYSICIAN ASSISTANT

## 2022-01-11 PROCEDURE — 94761 N-INVAS EAR/PLS OXIMETRY MLT: CPT

## 2022-01-11 PROCEDURE — 93010 EKG 12-LEAD: ICD-10-PCS | Mod: ,,, | Performed by: INTERNAL MEDICINE

## 2022-01-11 PROCEDURE — 93010 ELECTROCARDIOGRAM REPORT: CPT | Mod: ,,, | Performed by: INTERNAL MEDICINE

## 2022-01-11 PROCEDURE — 93005 ELECTROCARDIOGRAM TRACING: CPT

## 2022-01-11 PROCEDURE — 63600175 PHARM REV CODE 636 W HCPCS: Performed by: PHYSICIAN ASSISTANT

## 2022-01-11 PROCEDURE — 85730 THROMBOPLASTIN TIME PARTIAL: CPT | Performed by: STUDENT IN AN ORGANIZED HEALTH CARE EDUCATION/TRAINING PROGRAM

## 2022-01-11 PROCEDURE — 80197 ASSAY OF TACROLIMUS: CPT | Performed by: STUDENT IN AN ORGANIZED HEALTH CARE EDUCATION/TRAINING PROGRAM

## 2022-01-11 PROCEDURE — 20600001 HC STEP DOWN PRIVATE ROOM

## 2022-01-11 PROCEDURE — P9045 ALBUMIN (HUMAN), 5%, 250 ML: HCPCS | Mod: JG | Performed by: PHYSICIAN ASSISTANT

## 2022-01-11 PROCEDURE — 83735 ASSAY OF MAGNESIUM: CPT | Performed by: NURSE PRACTITIONER

## 2022-01-11 RX ORDER — ALUMINUM HYDROXIDE, MAGNESIUM HYDROXIDE, AND SIMETHICONE 2400; 240; 2400 MG/30ML; MG/30ML; MG/30ML
30 SUSPENSION ORAL EVERY 6 HOURS PRN
Status: DISCONTINUED | OUTPATIENT
Start: 2022-01-11 | End: 2022-01-13 | Stop reason: HOSPADM

## 2022-01-11 RX ORDER — BISACODYL 10 MG
10 SUPPOSITORY, RECTAL RECTAL DAILY PRN
Status: DISCONTINUED | OUTPATIENT
Start: 2022-01-11 | End: 2022-01-13 | Stop reason: HOSPADM

## 2022-01-11 RX ORDER — TACROLIMUS 1 MG/1
1 CAPSULE ORAL ONCE
Status: COMPLETED | OUTPATIENT
Start: 2022-01-11 | End: 2022-01-11

## 2022-01-11 RX ORDER — METOPROLOL SUCCINATE 50 MG/1
50 TABLET, EXTENDED RELEASE ORAL DAILY
Status: DISCONTINUED | OUTPATIENT
Start: 2022-01-12 | End: 2022-01-12

## 2022-01-11 RX ORDER — ALBUMIN HUMAN 50 G/1000ML
25 SOLUTION INTRAVENOUS ONCE
Status: COMPLETED | OUTPATIENT
Start: 2022-01-11 | End: 2022-01-11

## 2022-01-11 RX ORDER — TACROLIMUS 1 MG/1
2 CAPSULE ORAL 2 TIMES DAILY
Status: DISCONTINUED | OUTPATIENT
Start: 2022-01-11 | End: 2022-01-13 | Stop reason: HOSPADM

## 2022-01-11 RX ORDER — PANTOPRAZOLE SODIUM 40 MG/1
40 TABLET, DELAYED RELEASE ORAL DAILY
Status: DISCONTINUED | OUTPATIENT
Start: 2022-01-11 | End: 2022-01-13 | Stop reason: HOSPADM

## 2022-01-11 RX ORDER — BISACODYL 5 MG
5 TABLET, DELAYED RELEASE (ENTERIC COATED) ORAL NIGHTLY
Status: DISCONTINUED | OUTPATIENT
Start: 2022-01-11 | End: 2022-01-13 | Stop reason: HOSPADM

## 2022-01-11 RX ADMIN — ALBUMIN (HUMAN) 25 G: 12.5 SOLUTION INTRAVENOUS at 11:01

## 2022-01-11 RX ADMIN — HEPARIN SODIUM 5000 UNITS: 5000 INJECTION INTRAVENOUS; SUBCUTANEOUS at 05:01

## 2022-01-11 RX ADMIN — ALUMINUM HYDROXIDE, MAGNESIUM HYDROXIDE, AND DIMETHICONE 30 ML: 400; 400; 40 SUSPENSION ORAL at 05:01

## 2022-01-11 RX ADMIN — MYCOPHENOLATE MOFETIL 1000 MG: 250 CAPSULE ORAL at 08:01

## 2022-01-11 RX ADMIN — OXYCODONE HYDROCHLORIDE 10 MG: 10 TABLET ORAL at 01:01

## 2022-01-11 RX ADMIN — TACROLIMUS 2 MG: 1 CAPSULE ORAL at 05:01

## 2022-01-11 RX ADMIN — CALCIUM CARBONATE (ANTACID) CHEW TAB 500 MG 500 MG: 500 CHEW TAB at 07:01

## 2022-01-11 RX ADMIN — BISACODYL 10 MG: 10 SUPPOSITORY RECTAL at 10:01

## 2022-01-11 RX ADMIN — TACROLIMUS 1 MG: 1 CAPSULE ORAL at 10:01

## 2022-01-11 RX ADMIN — TACROLIMUS 1 MG: 1 CAPSULE ORAL at 07:01

## 2022-01-11 RX ADMIN — INSULIN DETEMIR 28 UNITS: 100 INJECTION, SOLUTION SUBCUTANEOUS at 08:01

## 2022-01-11 RX ADMIN — DOCUSATE SODIUM 100 MG: 100 CAPSULE ORAL at 08:01

## 2022-01-11 RX ADMIN — MUPIROCIN 1 G: 20 OINTMENT TOPICAL at 08:01

## 2022-01-11 RX ADMIN — INSULIN ASPART 2 UNITS: 100 INJECTION, SOLUTION INTRAVENOUS; SUBCUTANEOUS at 05:01

## 2022-01-11 RX ADMIN — METOPROLOL SUCCINATE 100 MG: 50 TABLET, EXTENDED RELEASE ORAL at 08:01

## 2022-01-11 RX ADMIN — INSULIN ASPART 4 UNITS: 100 INJECTION, SOLUTION INTRAVENOUS; SUBCUTANEOUS at 08:01

## 2022-01-11 RX ADMIN — TAMSULOSIN HYDROCHLORIDE 0.4 MG: 0.4 CAPSULE ORAL at 08:01

## 2022-01-11 RX ADMIN — METHYLPREDNISOLONE SODIUM SUCCINATE 40 MG: 40 INJECTION, POWDER, FOR SOLUTION INTRAMUSCULAR; INTRAVENOUS at 08:01

## 2022-01-11 RX ADMIN — CALCIUM CARBONATE (ANTACID) CHEW TAB 500 MG 500 MG: 500 CHEW TAB at 09:01

## 2022-01-11 RX ADMIN — AMIODARONE HYDROCHLORIDE 200 MG: 200 TABLET ORAL at 08:01

## 2022-01-11 RX ADMIN — HEPARIN SODIUM 5000 UNITS: 5000 INJECTION INTRAVENOUS; SUBCUTANEOUS at 08:01

## 2022-01-11 RX ADMIN — DOCUSATE SODIUM 100 MG: 100 CAPSULE ORAL at 03:01

## 2022-01-11 RX ADMIN — INSULIN ASPART 2 UNITS: 100 INJECTION, SOLUTION INTRAVENOUS; SUBCUTANEOUS at 08:01

## 2022-01-11 RX ADMIN — BISACODYL 5 MG: 5 TABLET, COATED ORAL at 10:01

## 2022-01-11 RX ADMIN — NYSTATIN 500000 UNITS: 500000 SUSPENSION ORAL at 08:01

## 2022-01-11 RX ADMIN — PANTOPRAZOLE SODIUM 40 MG: 40 TABLET, DELAYED RELEASE ORAL at 10:01

## 2022-01-11 RX ADMIN — INSULIN ASPART 4 UNITS: 100 INJECTION, SOLUTION INTRAVENOUS; SUBCUTANEOUS at 05:01

## 2022-01-11 RX ADMIN — OXYCODONE HYDROCHLORIDE 10 MG: 10 TABLET ORAL at 12:01

## 2022-01-11 RX ADMIN — APIXABAN 5 MG: 5 TABLET, FILM COATED ORAL at 08:01

## 2022-01-11 RX ADMIN — HEPARIN SODIUM 5000 UNITS: 5000 INJECTION INTRAVENOUS; SUBCUTANEOUS at 03:01

## 2022-01-11 RX ADMIN — OXYCODONE HYDROCHLORIDE 10 MG: 10 TABLET ORAL at 07:01

## 2022-01-11 RX ADMIN — OXYCODONE HYDROCHLORIDE 10 MG: 10 TABLET ORAL at 05:01

## 2022-01-11 RX ADMIN — NYSTATIN 500000 UNITS: 500000 SUSPENSION ORAL at 03:01

## 2022-01-11 RX ADMIN — SERTRALINE HYDROCHLORIDE 25 MG: 25 TABLET ORAL at 08:01

## 2022-01-11 RX ADMIN — INSULIN ASPART 4 UNITS: 100 INJECTION, SOLUTION INTRAVENOUS; SUBCUTANEOUS at 12:01

## 2022-01-11 RX ADMIN — NYSTATIN 500000 UNITS: 500000 SUSPENSION ORAL at 05:01

## 2022-01-11 RX ADMIN — ALUMINUM HYDROXIDE, MAGNESIUM HYDROXIDE, AND DIMETHICONE 30 ML: 400; 400; 40 SUSPENSION ORAL at 10:01

## 2022-01-11 RX ADMIN — APIXABAN 5 MG: 5 TABLET, FILM COATED ORAL at 10:01

## 2022-01-11 RX ADMIN — CALCIUM CARBONATE (ANTACID) CHEW TAB 500 MG 500 MG: 500 CHEW TAB at 01:01

## 2022-01-11 NOTE — ASSESSMENT & PLAN NOTE
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 Replaced LHA, reconstructed.  - POD#1 US satisfactory  - Tolerating diet, ambulating, +flatus -BM.  - Add dulcolax and suppository to bowel regimen.   - Drains and irwin out.  - LFTs trending down. POD 5 Liver US satisfactory.   - Monitor

## 2022-01-11 NOTE — SUBJECTIVE & OBJECTIVE
Scheduled Meds:   albumin human 5%  25 g Intravenous Once    amiodarone  200 mg Oral Daily    apixaban  5 mg Oral BID    bisacodyL  5 mg Oral QHS    docusate sodium  100 mg Oral TID    heparin (porcine)  5,000 Units Subcutaneous Q8H    insulin aspart U-100  4-8 Units Subcutaneous TIDWM    insulin detemir U-100  28 Units Subcutaneous Daily    metoprolol succinate  100 mg Oral Daily    mupirocin  1 g Nasal BID    mycophenolate  1,000 mg Oral BID    nystatin  500,000 Units Mouth/Throat TID PC    pantoprazole  40 mg Oral Daily    [START ON 1/12/2022] predniSONE  20 mg Oral Daily    sertraline  25 mg Oral Daily    [START ON 1/13/2022] sulfamethoxazole-trimethoprim 400-80mg  1 tablet Oral Daily AM    tacrolimus  2 mg Oral BID    tamsulosin  0.4 mg Oral QHS    [START ON 1/16/2022] valGANciclovir  450 mg Oral Daily     Continuous Infusions:  PRN Meds:aluminum & magnesium hydroxide-simethicone, bisacodyL, calcium carbonate, dextrose 50%, dextrose 50%, gabapentin, glucagon (human recombinant), glucose, glucose, insulin aspart U-100, oxyCODONE, oxyCODONE, sodium chloride 0.9%    Review of Systems   Constitutional: Negative for chills and fever.   HENT: Negative for facial swelling and trouble swallowing.    Eyes: Negative for photophobia and visual disturbance.   Respiratory: Negative for cough, shortness of breath and wheezing.    Cardiovascular: Negative for chest pain, palpitations and leg swelling.   Gastrointestinal: Positive for abdominal distention and abdominal pain. Negative for constipation, diarrhea, nausea and vomiting.        +indigestion   Genitourinary: Negative for difficulty urinating and dysuria.   Musculoskeletal: Negative for arthralgias.   Skin: Positive for wound.   Allergic/Immunologic: Positive for immunocompromised state.   Neurological: Positive for light-headedness. Negative for dizziness and tremors.   Hematological: Negative for adenopathy. Does not bruise/bleed easily.    Psychiatric/Behavioral: Positive for dysphoric mood. Negative for agitation, behavioral problems, confusion and decreased concentration.     Objective:     Vital Signs (Most Recent):  Temp: 97.5 °F (36.4 °C) (01/11/22 1158)  Pulse: (!) 52 (01/11/22 1158)  Resp: 18 (01/11/22 1158)  BP: (!) 146/65 (01/11/22 0800)  SpO2: (!) 93 % (01/11/22 1158) Vital Signs (24h Range):  Temp:  [97.5 °F (36.4 °C)-98.6 °F (37 °C)] 97.5 °F (36.4 °C)  Pulse:  [] 52  Resp:  [17-18] 18  SpO2:  [90 %-96 %] 93 %  BP: (126-168)/(60-78) 146/65     Weight: 99 kg (218 lb 4.1 oz)  Body mass index is 27.28 kg/m².    Intake/Output - Last 3 Shifts       01/09 0700  01/10 0659 01/10 0700 01/11 0659 01/11 0700 01/12 0659    P.O. 780 610 360    I.V. (mL/kg)   500 (5.1)    Total Intake(mL/kg) 780 (7.5) 610 (6.2) 860 (8.7)    Urine (mL/kg/hr) 1200 (0.5) 900 (0.4) 200 (0.4)    Emesis/NG output 0 0     Drains       Other  0     Stool 0 0     Blood  0     Total Output 1200 900 200    Net -420 -290 +660           Urine Occurrence  0 x     Stool Occurrence 0 x 0 x     Emesis Occurrence 0 x 0 x           Physical Exam  Vitals and nursing note reviewed.   Constitutional:       General: He is not in acute distress.     Appearance: He is well-developed.   HENT:      Head: Normocephalic.   Eyes:      General: No scleral icterus.        Right eye: No discharge.         Left eye: No discharge.      Conjunctiva/sclera: Conjunctivae normal.   Cardiovascular:      Rate and Rhythm: Regular rhythm. Bradycardia present.      Pulses: Normal pulses.      Heart sounds: Normal heart sounds.      Comments:    Pulmonary:      Effort: Pulmonary effort is normal. No respiratory distress.      Breath sounds: No wheezing or rales.   Abdominal:      General: Bowel sounds are normal. There is no distension.      Palpations: Abdomen is soft.      Tenderness: There is no abdominal tenderness. There is no guarding.          Comments: Staples CDI   R DANNIELLE drain sites sutured no  s/s/i     Musculoskeletal:         General: Normal range of motion.      Cervical back: Normal range of motion.      Right lower leg: No edema.      Left lower leg: No edema.   Skin:     General: Skin is warm and dry.      Coloration: Skin is not jaundiced.   Neurological:      General: No focal deficit present.      Mental Status: He is alert and oriented to person, place, and time.   Psychiatric:         Behavior: Behavior normal.         Thought Content: Thought content normal.         Judgment: Judgment normal.         Laboratory:  Immunosuppressants         Stop Route Frequency     tacrolimus capsule 2 mg         -- Oral 2 times daily     mycophenolate capsule 1,000 mg         -- Oral 2 times daily        CBC:   Recent Labs   Lab 01/11/22  0650   WBC 8.42   RBC 4.09*   HGB 12.3*   HCT 37.7*   *   MCV 92   MCH 30.1   MCHC 32.6     CMP:   Recent Labs   Lab 01/11/22  0651   GLU 97   CALCIUM 8.6*   ALBUMIN 2.9*   PROT 5.3*      K 4.2   CO2 31*   CL 99   BUN 45*   CREATININE 1.0   ALKPHOS 55   *   AST 53*   BILITOT 1.0     Labs within the past 24 hours have been reviewed.    Diagnostic Results:  I have personally reviewed all pertinent imaging studies.    Debility/Functional status: No debility noted.

## 2022-01-11 NOTE — PROGRESS NOTES
[facility] - [facility]  Liver Transplant  Progress Note    Patient Name: Tej Purdy  MRN: [id number]  Admission Date: 1/5/2022  Hospital Length of Stay: 6 days  Code Status: Full Code  Primary Care Provider: Thais Maxwell MD  Post-Operative Day: 5    ORGAN:   LIVER  Disease Etiology: Primary Liver Malignancy: Hepatoma (HCC) and Cirrhosis  Donor Type:   Donation after Brain Death  CDC High Risk:   No  Donor CMV Status:   Donor CMV Status: Positive  Donor HBcAB:   Negative  Donor HCV Status:   Negative  Donor HBV JAYLIN: Negative  Donor HCV JAYLIN: Negative  Whole or Partial: Whole Liver  Biliary Anastomosis: End to End  Arterial Anatomy: Replaced Left Hepatic from Left Gastric  Subjective:     History of Present Illness:  Mr Tej Purdy is a 65 y/o M with Afib (on coumadin), CASTILLO cirrhosis, and HCC s/p TACE and RFA who is listed for liver transplant with MELD 24. He now presents to the hospital for liver transplant. He reports being in his usual state of health. Denies fever/chills. Pre op labs and imaging pending.     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 Replaced LHA, reconstructed. POD#1 liver US satisfactory. H/O A fib resumed home metoprolol and amiodarone, held Warfarin. Stepped down to TSU 1/7. On 1/8, patient converted to A-fib on 1/8 heart rates from 90's 140's with ambulation. Cardiology consulted.    Interval History: No acute events overnight. Pt not feeling well today - c/o abdominal pain/distention and indigestion. +flatus, no BM. Start protonix, adjust bowel regimen, add suppository. Feeling lightheaded with standing, will hydrate with 5% albumin. Cardiology consulted for recurrent afib -> Followed recs and increased Toprol to 50 BID 1/9 then 100 mg daily 1/10. Cont Amio at current dose (200 mg daily). Rate better controlled 1/10. No plan for cardioversion at this time, per cards. Resumed Eliquis 1/11.  Cr stable. LFTs trending down.  Liver US today satisfactory. Monitor.       Scheduled Meds:   albumin human 5%  25 g Intravenous Once    amiodarone  200 mg Oral Daily    apixaban  5 mg Oral BID    bisacodyL  5 mg Oral QHS    docusate sodium  100 mg Oral TID    heparin (porcine)  5,000 Units Subcutaneous Q8H    insulin aspart U-100  4-8 Units Subcutaneous TIDWM    insulin detemir U-100  28 Units Subcutaneous Daily    metoprolol succinate  100 mg Oral Daily    mupirocin  1 g Nasal BID    mycophenolate  1,000 mg Oral BID    nystatin  500,000 Units Mouth/Throat TID PC    pantoprazole  40 mg Oral Daily    [START ON 1/12/2022] predniSONE  20 mg Oral Daily    sertraline  25 mg Oral Daily    [START ON 1/13/2022] sulfamethoxazole-trimethoprim 400-80mg  1 tablet Oral Daily AM    tacrolimus  2 mg Oral BID    tamsulosin  0.4 mg Oral QHS    [START ON 1/16/2022] valGANciclovir  450 mg Oral Daily     Continuous Infusions:  PRN Meds:aluminum & magnesium hydroxide-simethicone, bisacodyL, calcium carbonate, dextrose 50%, dextrose 50%, gabapentin, glucagon (human recombinant), glucose, glucose, insulin aspart U-100, oxyCODONE, oxyCODONE, sodium chloride 0.9%    Review of Systems   Constitutional: Negative for chills and fever.   HENT: Negative for facial swelling and trouble swallowing.    Eyes: Negative for photophobia and visual disturbance.   Respiratory: Negative for cough, shortness of breath and wheezing.    Cardiovascular: Negative for chest pain, palpitations and leg swelling.   Gastrointestinal: Positive for abdominal distention and abdominal pain. Negative for constipation, diarrhea, nausea and vomiting.        +indigestion   Genitourinary: Negative for difficulty urinating and dysuria.   Musculoskeletal: Negative for arthralgias.   Skin: Positive for wound.   Allergic/Immunologic: Positive for immunocompromised state.   Neurological: Positive for light-headedness. Negative for dizziness and tremors.   Hematological: Negative for  adenopathy. Does not bruise/bleed easily.   Psychiatric/Behavioral: Positive for dysphoric mood. Negative for agitation, behavioral problems, confusion and decreased concentration.     Objective:     Vital Signs (Most Recent):  Temp: 97.5 °F (36.4 °C) (01/11/22 1158)  Pulse: (!) 52 (01/11/22 1158)  Resp: 18 (01/11/22 1158)  BP: (!) 146/65 (01/11/22 0800)  SpO2: (!) 93 % (01/11/22 1158) Vital Signs (24h Range):  Temp:  [97.5 °F (36.4 °C)-98.6 °F (37 °C)] 97.5 °F (36.4 °C)  Pulse:  [] 52  Resp:  [17-18] 18  SpO2:  [90 %-96 %] 93 %  BP: (126-168)/(60-78) 146/65     Weight: 99 kg (218 lb 4.1 oz)  Body mass index is 27.28 kg/m².    Intake/Output - Last 3 Shifts       01/09 0700  01/10 0659 01/10 0700  01/11 0659 01/11 0700  01/12 0659    P.O. 780 610 360    I.V. (mL/kg)   500 (5.1)    Total Intake(mL/kg) 780 (7.5) 610 (6.2) 860 (8.7)    Urine (mL/kg/hr) 1200 (0.5) 900 (0.4) 200 (0.4)    Emesis/NG output 0 0     Drains       Other  0     Stool 0 0     Blood  0     Total Output 1200 900 200    Net -420 -290 +660           Urine Occurrence  0 x     Stool Occurrence 0 x 0 x     Emesis Occurrence 0 x 0 x           Physical Exam  Vitals and nursing note reviewed.   Constitutional:       General: He is not in acute distress.     Appearance: He is well-developed.   HENT:      Head: Normocephalic.   Eyes:      General: No scleral icterus.        Right eye: No discharge.         Left eye: No discharge.      Conjunctiva/sclera: Conjunctivae normal.   Cardiovascular:      Rate and Rhythm: Regular rhythm. Bradycardia present.      Pulses: Normal pulses.      Heart sounds: Normal heart sounds.      Comments:    Pulmonary:      Effort: Pulmonary effort is normal. No respiratory distress.      Breath sounds: No wheezing or rales.   Abdominal:      General: Bowel sounds are normal. There is no distension.      Palpations: Abdomen is soft.      Tenderness: There is no abdominal tenderness. There is no guarding.          Comments:  "Staples CDI   R DANNIELLE drain sites sutured no s/s/i     Musculoskeletal:         General: Normal range of motion.      Cervical back: Normal range of motion.      Right lower leg: No edema.      Left lower leg: No edema.   Skin:     General: Skin is warm and dry.      Coloration: Skin is not jaundiced.   Neurological:      General: No focal deficit present.      Mental Status: He is alert and oriented to person, place, and time.   Psychiatric:         Behavior: Behavior normal.         Thought Content: Thought content normal.         Judgment: Judgment normal.         Laboratory:  Immunosuppressants         Stop Route Frequency     tacrolimus capsule 2 mg         -- Oral 2 times daily     mycophenolate capsule 1,000 mg         -- Oral 2 times daily        CBC:   Recent Labs   Lab 01/11/22  0650   WBC 8.42   RBC 4.09*   HGB 12.3*   HCT 37.7*   *   MCV 92   MCH 30.1   MCHC 32.6     CMP:   Recent Labs   Lab 01/11/22  0651   GLU 97   CALCIUM 8.6*   ALBUMIN 2.9*   PROT 5.3*      K 4.2   CO2 31*   CL 99   BUN 45*   CREATININE 1.0   ALKPHOS 55   *   AST 53*   BILITOT 1.0     Labs within the past 24 hours have been reviewed.    Diagnostic Results:  I have personally reviewed all pertinent imaging studies.    Debility/Functional status: No debility noted.    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 Replaced LHA, reconstructed.  - POD#1 US satisfactory  - Tolerating diet, ambulating, +flatus -BM.  - Add dulcolax and suppository to bowel regimen.   - Drains and irwin out.  - LFTs trending down. POD 5 Liver US satisfactory.   - Monitor      Acute blood loss anemia  - Expected post liver transplant + anemia of chronic disease (ESLD)  - H/H stable.  - Monitor with daily cbc.      Anemia of chronic disease  - See "acute blood loss anemia"      Adverse effect of adrenal cortical steroids, sequela  - Monitor.      Prophylactic immunotherapy  - Continue Prograf.  - Continue to " monitor for toxic side effects and check daily levels. Will adjust for therapeutic dose.  - Continue Cellcept.   - Continue steroids.      At risk for opportunistic infections  - Bactrim for PCP ppx.  - Valcyte for CMV ppx.  - Nystatin for thrush ppx.      Atrial fibrillation with RVR  - H/O A fib restarted Hhome metoprolol and amiodarone (holding Coumadin).   - Initial plan was for Eliquis 1/10   - Converted to A-fib on 1/8  - Cardiology consulted for recurrent afib  -> Followed recs and increased Toprol to 50 BID 1/9 then 100 mg daily 1/10. Cont Amio at current dose (200 mg daily).  - Rate better controlled 1/10. No plan for cardioversion at this time, per cards.   - Eliquis restarted 1/11.  HR now 50-60s. Repeat EKG.   - Will need outpatient cards/EP f/u.  - Monitor          Type 2 diabetes mellitus  - Endocrine following patient, appreciate rec's   - Monitor           VTE Risk Mitigation (From admission, onward)         Ordered     apixaban tablet 5 mg  2 times daily         01/11/22 0954     heparin (porcine) injection 5,000 Units  Every 8 hours         01/06/22 1314     IP VTE HIGH RISK PATIENT  Once         01/06/22 1314                The patients clinical status was discussed at multidisplinary rounds, involving transplant surgery, transplant medicine, pharmacy, nursing, nutrition, and social work    Discharge Planning: Not a candidate for discharge at this time. Possible d/c in next 1-2 days if continues to progress well.     Ana Alvarado PA-C  Liver Transplant  [facility]

## 2022-01-11 NOTE — PLAN OF CARE
Patient is AAOx3. [employer] IVANIA with staples, patient is painting the incision with betadine. Patient had a liver US this morning. Patient received albumin and a dulcolax suppository. Patient reports 1 BM. Blue card updated and meds filled. PT/OT are working with the patient. Patient sat up in the chair for a few hours today. Patient is SB on telemetry. Monitoring the patient's blood sugar AC&HS. Reminded the patient to call for assistance. Call light and personal items are within reach.

## 2022-01-11 NOTE — PROGRESS NOTES
Patient's HR alarming 40s on telemetry. Upon entering the patient's room, patient is asleep. Woke patient up, patient reports that he feels sleepy after taking the pain medication. All of VSS. OT at the bedside, patient now sitting up on side of the bed. HR is now in the lower 50s. Paige STEPHEN notified and reports that she will change the dose of the lopressor and add hold parameters.

## 2022-01-11 NOTE — CARE UPDATE
BG goal 140-180    -A1C   Lab Results   Component Value Date    HGBA1C 5.8 (H) 01/05/2022         -HOME REGIMEN: Lantus 22 units HS  Metformin 1000 mg BID    -INPATIENT REGIMEN: levemir 28 units and novolog 4 units with meals     -GLUCOSE TREND FOR THE PAST 24HRS:     -NO HYPOGYCEMIAS NOTED     -TOLERATING % OF PO DIET     -Diet: Diet diabetic [facility]; 2000 Calorie    -Steroids - solumedrol 40 mg       Remains in TSU, NAEON. BG reasonably well controlled with current regimen.       Plan:  Decrease to levemir 24 units daily; FBG slightly below goal  Continue novolog 4-8 units with meals.   BG monitoring ac/hs and moderate dose correction scale.     Discharge planning:tbd     Endocrine to continue to follow    ** Please call Endocrine for any BG related issues **

## 2022-01-11 NOTE — PLAN OF CARE
Patient is AAOx4.  Afebrile  RA  AC&HS glucose monitoring.   Telemetry monitoring in place.  RIJ trialysis pulled. Dressing in placed CDI. No signs of bleeding.   RLQ DANNIELLE site with dressing. Stitch placed this AM.   Chevron incision IVANIA with staples, patient applied betadine to incision per self med.   Importance of pulling self medications reinforced. Blue card updated.   Instructed to call for assistance.   Nonskid socks when oob.   Bed is in lowest position with wheels locked.   TM

## 2022-01-11 NOTE — PLAN OF CARE
Pt. AAOx4, VSS, spo2> 94% on room air. Sinus cornel on tele (50s). Pt. Ambulating independently. Prn pain medication administered per pt request, PRN tums given per pt request. Self medications opened and taken before RN could check--wrappers pulled from trash can and checked--RN re-enforced to pt that all medications are not to be opened before being checked by RN. Bed in low locked position, call light within reach, pt instructed to call for assistance needed, pt verbalized understanding, remains free from falls this shift. WCTM.

## 2022-01-11 NOTE — PT/OT/SLP PROGRESS
Physical Therapy Treatment    Patient Name:  Tej Purdy   MRN:  [id number]    Recommendations:     Discharge Recommendations:  home   Discharge Equipment Recommendations: none   Barriers to discharge: decrease endurance    Assessment:     Tej Purdy is a 66 y.o. male admitted with a medical diagnosis of End stage liver disease.  He presents with the following impairments/functional limitations:  weakness,impaired endurance,impaired functional mobilty,gait instability,decreased coordination.    Pt appear very fatigue upon therapist entering room, pt was easily awaken. Treatment session limited secondary to high pain and fatigue. Pt ambulated 25ft with CGA and RW, slightly more than previous visit. Pt heart maintained within the 50s throughout session. Nurse was notified. Pt is progressing well and continues to benefit from therapy to improve functional mobilities.     Rehab Prognosis: Good; patient would benefit from acute skilled PT services to address these deficits and reach maximum level of function.    Recent Surgery: Procedure(s) (LRB):  TRANSPLANT, LIVER (N/A) 5 Days Post-Op    Plan:     During this hospitalization, patient to be seen 4 x/week to address the identified rehab impairments via gait training,therapeutic activities,therapeutic exercises,neuromuscular re-education and progress toward the following goals:    · Plan of Care Expires:  02/06/22    Subjective     Patient/Family Comments/goals: pt agreeable to therapy  Pain/Comfort:  · Pain Rating 1: 10/10  · Location - Side 1: Right  · Location - Orientation 1: generalized  · Location 1: abdomen  · Pain Addressed 1: Pre-medicate for activity,Reposition,Cessation of Activity  · Pain Rating Post-Intervention 1: 10/10      Objective:     Communicated with nurse prior to session.  Patient found HOB elevated with peripheral IV upon PT entry to room.     General Precautions: Standard, fall   Orthopedic Precautions:N/A   Braces: N/A  Respiratory Status:  Room air     Functional Mobility:  · Bed Mobility:     · Supine to Sit: stand by assistance  · Sit to Supine: minimum assistance  · Transfers:     · Sit to Stand:  contact guard assistance with rolling walker  · Gait: pt ambulated 25ft with CGA and RW. Pt demonstrated mildly unsteady gait with flexed posture, decrease mik, and decrease step length.       AM-PAC 6 CLICK MOBILITY  Turning over in bed (including adjusting bedclothes, sheets and blankets)?: 3  Sitting down on and standing up from a chair with arms (e.g., wheelchair, bedside commode, etc.): 3  Moving from lying on back to sitting on the side of the bed?: 3  Moving to and from a bed to a chair (including a wheelchair)?: 3  Need to walk in hospital room?: 3  Climbing 3-5 steps with a railing?: 3  Basic Mobility Total Score: 18       Therapeutic Activities and Exercises:   Pt's heart was in the 50s during therapy session. Nurse notified.   Pt appear fatigue, but was easily awoken upon sounds.   Pt demonstrated good EOB sitting balance while performing seated therex.   Seated BLE therex x10: LAQ, marching, toe/heel raise.   Pt educated about performing therex throughout the day. Pt verbalized understanding.     Patient left HOB elevated with all lines intact, call button in reach and nurse notified..    GOALS:   Multidisciplinary Problems     Physical Therapy Goals        Problem: Physical Therapy Goal    Goal Priority Disciplines Outcome Goal Variances Interventions   Physical Therapy Goal     PT, PT/OT Ongoing, Progressing     Description: Goals to be met by: 2022     Patient will increase functional independence with mobility by performin. Sit to stand transfer with Modified [place]  2. Bed to chair transfer with Supervision using LRAD  3. Gait  x 250 feet with Supervision using LRAD and standing rest breaks prn.   4. Ascend/descend 19 stair with left Handrails Stand-by Assistance using LRAD.   5. Lower extremity exercise program x30  reps per handout, with independence                     Time Tracking:     PT Received On: 01/11/22  PT Start Time: 1447     PT Stop Time: 1459  PT Total Time (min): 12 min     Billable Minutes: Gait Training 12    Treatment Type: Treatment  PT/PTA: PTA     PTA Visit Number: 1     01/11/2022

## 2022-01-11 NOTE — ASSESSMENT & PLAN NOTE
- H/O A fib restarted Hhome metoprolol and amiodarone (holding Coumadin).   - Initial plan was for Eliquis 1/10   - Converted to A-fib on 1/8  - Cardiology consulted for recurrent afib  -> Followed recs and increased Toprol to 50 BID 1/9 then 100 mg daily 1/10. Cont Amio at current dose (200 mg daily).  - Rate better controlled 1/10. No plan for cardioversion at this time, per cards.   - Eliquis restarted 1/11.  HR now 50-60s. Repeat EKG.   - Will need outpatient cards/EP f/u.  - Monitor

## 2022-01-12 PROBLEM — D72.829 LEUKOCYTOSIS: Status: RESOLVED | Noted: 2021-01-15 | Resolved: 2022-01-12

## 2022-01-12 LAB
ALBUMIN SERPL BCP-MCNC: 3.4 G/DL (ref 3.5–5.2)
ALP SERPL-CCNC: 60 U/L (ref 55–135)
ALT SERPL W/O P-5'-P-CCNC: 152 U/L (ref 10–44)
ANION GAP SERPL CALC-SCNC: 9 MMOL/L (ref 8–16)
APTT BLDCRRT: 26.4 SEC (ref 21–32)
AST SERPL-CCNC: 42 U/L (ref 10–40)
BASOPHILS # BLD AUTO: 0.03 K/UL (ref 0–0.2)
BASOPHILS NFR BLD: 0.3 % (ref 0–1.9)
BILIRUB SERPL-MCNC: 1.1 MG/DL (ref 0.1–1)
BUN SERPL-MCNC: 42 MG/DL (ref 8–23)
CALCIUM SERPL-MCNC: 9.1 MG/DL (ref 8.7–10.5)
CHLORIDE SERPL-SCNC: 100 MMOL/L (ref 95–110)
CO2 SERPL-SCNC: 29 MMOL/L (ref 23–29)
CREAT SERPL-MCNC: 1.1 MG/DL (ref 0.5–1.4)
DIFFERENTIAL METHOD: ABNORMAL
EOSINOPHIL # BLD AUTO: 0.2 K/UL (ref 0–0.5)
EOSINOPHIL NFR BLD: 2.7 % (ref 0–8)
ERYTHROCYTE [DISTWIDTH] IN BLOOD BY AUTOMATED COUNT: 13.5 % (ref 11.5–14.5)
EST. GFR  (AFRICAN AMERICAN): >60 ML/MIN/1.73 M^2
EST. GFR  (NON AFRICAN AMERICAN): >60 ML/MIN/1.73 M^2
GLUCOSE SERPL-MCNC: 127 MG/DL (ref 70–110)
HCT VFR BLD AUTO: 35.9 % (ref 40–54)
HGB BLD-MCNC: 11.8 G/DL (ref 14–18)
IMM GRANULOCYTES # BLD AUTO: 0.19 K/UL (ref 0–0.04)
IMM GRANULOCYTES NFR BLD AUTO: 2.1 % (ref 0–0.5)
INR PPP: 1.2 (ref 0.8–1.2)
LYMPHOCYTES # BLD AUTO: 0.5 K/UL (ref 1–4.8)
LYMPHOCYTES NFR BLD: 5 % (ref 18–48)
MAGNESIUM SERPL-MCNC: 1.9 MG/DL (ref 1.6–2.6)
MCH RBC QN AUTO: 29.6 PG (ref 27–31)
MCHC RBC AUTO-ENTMCNC: 32.9 G/DL (ref 32–36)
MCV RBC AUTO: 90 FL (ref 82–98)
MONOCYTES # BLD AUTO: 1.1 K/UL (ref 0.3–1)
MONOCYTES NFR BLD: 11.9 % (ref 4–15)
NEUTROPHILS # BLD AUTO: 7.1 K/UL (ref 1.8–7.7)
NEUTROPHILS NFR BLD: 78 % (ref 38–73)
NRBC BLD-RTO: 0 /100 WBC
PHOSPHATE SERPL-MCNC: 2.8 MG/DL (ref 2.7–4.5)
PLATELET # BLD AUTO: 111 K/UL (ref 150–450)
PMV BLD AUTO: 11.3 FL (ref 9.2–12.9)
POCT GLUCOSE: 136 MG/DL (ref 70–110)
POCT GLUCOSE: 144 MG/DL (ref 70–110)
POCT GLUCOSE: 162 MG/DL (ref 70–110)
POCT GLUCOSE: 165 MG/DL (ref 70–110)
POTASSIUM SERPL-SCNC: 4.3 MMOL/L (ref 3.5–5.1)
PROT SERPL-MCNC: 5.8 G/DL (ref 6–8.4)
PROTHROMBIN TIME: 12.9 SEC (ref 9–12.5)
RBC # BLD AUTO: 3.99 M/UL (ref 4.6–6.2)
SODIUM SERPL-SCNC: 138 MMOL/L (ref 136–145)
TACROLIMUS BLD-MCNC: 7.1 NG/ML (ref 5–15)
WBC # BLD AUTO: 9.05 K/UL (ref 3.9–12.7)

## 2022-01-12 PROCEDURE — 80053 COMPREHEN METABOLIC PANEL: CPT | Performed by: STUDENT IN AN ORGANIZED HEALTH CARE EDUCATION/TRAINING PROGRAM

## 2022-01-12 PROCEDURE — 63600175 PHARM REV CODE 636 W HCPCS: Performed by: STUDENT IN AN ORGANIZED HEALTH CARE EDUCATION/TRAINING PROGRAM

## 2022-01-12 PROCEDURE — 80197 ASSAY OF TACROLIMUS: CPT | Performed by: STUDENT IN AN ORGANIZED HEALTH CARE EDUCATION/TRAINING PROGRAM

## 2022-01-12 PROCEDURE — 84100 ASSAY OF PHOSPHORUS: CPT | Performed by: PHYSICIAN ASSISTANT

## 2022-01-12 PROCEDURE — 83735 ASSAY OF MAGNESIUM: CPT | Performed by: NURSE PRACTITIONER

## 2022-01-12 PROCEDURE — 85025 COMPLETE CBC W/AUTO DIFF WBC: CPT | Performed by: STUDENT IN AN ORGANIZED HEALTH CARE EDUCATION/TRAINING PROGRAM

## 2022-01-12 PROCEDURE — 99233 PR SUBSEQUENT HOSPITAL CARE,LEVL III: ICD-10-PCS | Mod: 24,,, | Performed by: PHYSICIAN ASSISTANT

## 2022-01-12 PROCEDURE — 99233 SBSQ HOSP IP/OBS HIGH 50: CPT | Mod: 24,,, | Performed by: PHYSICIAN ASSISTANT

## 2022-01-12 PROCEDURE — 97116 GAIT TRAINING THERAPY: CPT | Mod: CQ

## 2022-01-12 PROCEDURE — 85730 THROMBOPLASTIN TIME PARTIAL: CPT | Performed by: STUDENT IN AN ORGANIZED HEALTH CARE EDUCATION/TRAINING PROGRAM

## 2022-01-12 PROCEDURE — 99232 PR SUBSEQUENT HOSPITAL CARE,LEVL II: ICD-10-PCS | Mod: ,,, | Performed by: NURSE PRACTITIONER

## 2022-01-12 PROCEDURE — 25000003 PHARM REV CODE 250: Performed by: STUDENT IN AN ORGANIZED HEALTH CARE EDUCATION/TRAINING PROGRAM

## 2022-01-12 PROCEDURE — 63600175 PHARM REV CODE 636 W HCPCS: Performed by: PHYSICIAN ASSISTANT

## 2022-01-12 PROCEDURE — 99232 SBSQ HOSP IP/OBS MODERATE 35: CPT | Mod: ,,, | Performed by: NURSE PRACTITIONER

## 2022-01-12 PROCEDURE — 20600001 HC STEP DOWN PRIVATE ROOM

## 2022-01-12 PROCEDURE — 25000003 PHARM REV CODE 250: Performed by: PHYSICIAN ASSISTANT

## 2022-01-12 PROCEDURE — 85610 PROTHROMBIN TIME: CPT | Performed by: STUDENT IN AN ORGANIZED HEALTH CARE EDUCATION/TRAINING PROGRAM

## 2022-01-12 RX ORDER — HYDRALAZINE HYDROCHLORIDE 20 MG/ML
10 INJECTION INTRAMUSCULAR; INTRAVENOUS EVERY 6 HOURS PRN
Status: DISCONTINUED | OUTPATIENT
Start: 2022-01-12 | End: 2022-01-12

## 2022-01-12 RX ORDER — HYDRALAZINE HYDROCHLORIDE 20 MG/ML
10 INJECTION INTRAMUSCULAR; INTRAVENOUS EVERY 6 HOURS PRN
Status: DISCONTINUED | OUTPATIENT
Start: 2022-01-12 | End: 2022-01-13 | Stop reason: HOSPADM

## 2022-01-12 RX ADMIN — INSULIN ASPART 2 UNITS: 100 INJECTION, SOLUTION INTRAVENOUS; SUBCUTANEOUS at 05:01

## 2022-01-12 RX ADMIN — INSULIN ASPART 4 UNITS: 100 INJECTION, SOLUTION INTRAVENOUS; SUBCUTANEOUS at 07:01

## 2022-01-12 RX ADMIN — HYDRALAZINE HYDROCHLORIDE 10 MG: 20 INJECTION, SOLUTION INTRAMUSCULAR; INTRAVENOUS at 05:01

## 2022-01-12 RX ADMIN — NYSTATIN 500000 UNITS: 500000 SUSPENSION ORAL at 07:01

## 2022-01-12 RX ADMIN — MYCOPHENOLATE MOFETIL 1000 MG: 250 CAPSULE ORAL at 07:01

## 2022-01-12 RX ADMIN — PANTOPRAZOLE SODIUM 40 MG: 40 TABLET, DELAYED RELEASE ORAL at 07:01

## 2022-01-12 RX ADMIN — SERTRALINE HYDROCHLORIDE 25 MG: 25 TABLET ORAL at 07:01

## 2022-01-12 RX ADMIN — INSULIN ASPART 4 UNITS: 100 INJECTION, SOLUTION INTRAVENOUS; SUBCUTANEOUS at 12:01

## 2022-01-12 RX ADMIN — OXYCODONE HYDROCHLORIDE 10 MG: 10 TABLET ORAL at 11:01

## 2022-01-12 RX ADMIN — DOCUSATE SODIUM 100 MG: 100 CAPSULE ORAL at 03:01

## 2022-01-12 RX ADMIN — APIXABAN 5 MG: 5 TABLET, FILM COATED ORAL at 07:01

## 2022-01-12 RX ADMIN — NYSTATIN 500000 UNITS: 500000 SUSPENSION ORAL at 03:01

## 2022-01-12 RX ADMIN — OXYCODONE HYDROCHLORIDE 10 MG: 10 TABLET ORAL at 05:01

## 2022-01-12 RX ADMIN — NYSTATIN 500000 UNITS: 500000 SUSPENSION ORAL at 05:01

## 2022-01-12 RX ADMIN — AMIODARONE HYDROCHLORIDE 200 MG: 200 TABLET ORAL at 07:01

## 2022-01-12 RX ADMIN — TAMSULOSIN HYDROCHLORIDE 0.4 MG: 0.4 CAPSULE ORAL at 08:01

## 2022-01-12 RX ADMIN — PREDNISONE 20 MG: 20 TABLET ORAL at 07:01

## 2022-01-12 RX ADMIN — TACROLIMUS 2 MG: 1 CAPSULE ORAL at 07:01

## 2022-01-12 RX ADMIN — APIXABAN 5 MG: 5 TABLET, FILM COATED ORAL at 08:01

## 2022-01-12 RX ADMIN — DOCUSATE SODIUM 100 MG: 100 CAPSULE ORAL at 08:01

## 2022-01-12 RX ADMIN — BISACODYL 5 MG: 5 TABLET, COATED ORAL at 08:01

## 2022-01-12 RX ADMIN — DOCUSATE SODIUM 100 MG: 100 CAPSULE ORAL at 07:01

## 2022-01-12 RX ADMIN — INSULIN ASPART 2 UNITS: 100 INJECTION, SOLUTION INTRAVENOUS; SUBCUTANEOUS at 12:01

## 2022-01-12 RX ADMIN — TACROLIMUS 2 MG: 1 CAPSULE ORAL at 05:01

## 2022-01-12 RX ADMIN — ALUMINUM HYDROXIDE, MAGNESIUM HYDROXIDE, AND DIMETHICONE 30 ML: 400; 400; 40 SUSPENSION ORAL at 12:01

## 2022-01-12 RX ADMIN — MYCOPHENOLATE MOFETIL 1000 MG: 250 CAPSULE ORAL at 08:01

## 2022-01-12 NOTE — ASSESSMENT & PLAN NOTE
BG goal 140 - 180     - Levemir 24 units qd  - continue Novolog 4-8 TIDWM (Weight-based at 0.5 units/kg/day; started due prandial excursions).   - continue Novolog MDC (150/25) prn for BG excursions.   - BG checks AC/HS    ** Please call Endocrine for any BG related issues **  ** Please notify Endocrine for any change and/or advance in diet**  Lab Results   Component Value Date    HGBA1C 5.8 (H) 01/05/2022       Discharge planning: TBD

## 2022-01-12 NOTE — PT/OT/SLP PROGRESS
"Physical Therapy Treatment    Patient Name:  Tej Purdy   MRN:  [id number]    Recommendations:     Discharge Recommendations:  home   Discharge Equipment Recommendations: none   Barriers to discharge: decrease functional mobility     Assessment:     Tej Purdy is a 66 y.o. male admitted with a medical diagnosis of S/P liver transplant.  He presents with the following impairments/functional limitations:  weakness,impaired endurance,impaired self care skills,impaired functional mobilty,gait instability,decreased coordination,pain.  Pt appear fatigue upon initial visit, but agreeable to therapy after getting pain medication. Pt required less assistance with bed mobility, but limited gait training today. Pt reports feeling dizzy during gait training. BP was taken seated in chair, 198/88. Nurse was notified and present at end of treatment session.     Rehab Prognosis: Fair; patient would benefit from acute skilled PT services to address these deficits and reach maximum level of function.    Recent Surgery: Procedure(s) (LRB):  TRANSPLANT, LIVER (N/A) 6 Days Post-Op    Plan:     During this hospitalization, patient to be seen 4 x/week to address the identified rehab impairments via gait training,therapeutic activities,therapeutic exercises,neuromuscular re-education and progress toward the following goals:    · Plan of Care Expires:  02/06/22    Subjective     Chief Complaint: "I'm tired"  Pain/Comfort:  · Pain Rating 1: 6/10  · Location - Side 1: Right  · Location - Orientation 1: generalized  · Location 1: abdomen  · Pain Addressed 1: Pre-medicate for activity,Reposition,Distraction,Nurse notified  · Pain Rating Post-Intervention 1: 6/10      Objective:     Communicated with nurse prior to session.  Patient found HOB elevated with telemetry,peripheral IV upon PT entry to room.     General Precautions: Standard, fall   Orthopedic Precautions:N/A   Braces: N/A  Respiratory Status: Room air     Functional " Mobility:  · Bed Mobility:     · Supine to Sit: stand by assistance  · Transfers:     · Sit to Stand:  contact guard assistance with rolling walker  · Bed to Chair: contact guard assistance with  rolling walker  using  Step Transfer  · Gait: pt ambulated ~16ft with CGA and RW. Pt demonstrated mildly unsteady gait with decrease heel strike, narrow ANA, and decrease step length. Verbal cues to  feet with minimal compliance.   · Pt reports feeling dizzy during gait training. Pt seated in bedside chair while PCT measured BP and took vitals.   · BP seated post gait: 198/88  SpO2: 94%  · Nurse notified and present at end of treatment session      AM-PAC 6 CLICK MOBILITY  Turning over in bed (including adjusting bedclothes, sheets and blankets)?: 4  Sitting down on and standing up from a chair with arms (e.g., wheelchair, bedside commode, etc.): 3  Moving from lying on back to sitting on the side of the bed?: 3  Moving to and from a bed to a chair (including a wheelchair)?: 3  Need to walk in hospital room?: 3  Climbing 3-5 steps with a railing?: 3  Basic Mobility Total Score: 19       Therapeutic Activities and Exercises:   pt appear very fatigue upon therapist entering room. Pt was easily awoken by sound.   Treatment session was limited secondary to increase BP following gait training.    Patient left up in chair with all lines intact, call button in reach, nurse notified and present..    GOALS:   Multidisciplinary Problems     Physical Therapy Goals        Problem: Physical Therapy Goal    Goal Priority Disciplines Outcome Goal Variances Interventions   Physical Therapy Goal     PT, PT/OT Ongoing, Progressing     Description: Goals to be met by: 2022     Patient will increase functional independence with mobility by performin. Sit to stand transfer with Modified [place]  2. Bed to chair transfer with Supervision using LRAD  3. Gait  x 250 feet with Supervision using LRAD and standing rest breaks  prn.   4. Ascend/descend 19 stair with left Handrails Stand-by Assistance using LRAD.   5. Lower extremity exercise program x30 reps per handout, with independence                     Time Tracking:     PT Received On: 01/12/22  PT Start Time: 1155     PT Stop Time: 1213  PT Total Time (min): 18 min     Billable Minutes: Gait Training 18    Treatment Type: Treatment  PT/PTA: PTA     PTA Visit Number: 2     01/12/2022

## 2022-01-12 NOTE — SUBJECTIVE & OBJECTIVE
"Interval HPI:   Overnight events:  Remains in TSU. NAEON. Tentative discharge today but c/o pain - on hold. BG reasonably well controlled with current regimen. Prednisone 20 mg daily.   Eating:  Diet diabetic [facility]; 2000 Calorie 50%  Nausea: No  Hypoglycemia and intervention: No  Fever: No  TPN and/or TF: No    BP (!) 198/88 (BP Location: Right arm, Patient Position: Sitting)   Pulse (!) 51   Temp 97.9 °F (36.6 °C) (Oral)   Resp 20   Ht 6' 3" (1.905 m)   Wt 102 kg (224 lb 13.9 oz)   SpO2 95%   BMI 28.11 kg/m²     Labs Reviewed and Include    Recent Labs   Lab 01/12/22  0650   *   CALCIUM 9.1   ALBUMIN 3.4*   PROT 5.8*      K 4.3   CO2 29      BUN 42*   CREATININE 1.1   ALKPHOS 60   *   AST 42*   BILITOT 1.1*     Lab Results   Component Value Date    WBC 9.05 01/12/2022    HGB 11.8 (L) 01/12/2022    HCT 35.9 (L) 01/12/2022    MCV 90 01/12/2022     (L) 01/12/2022     No results for input(s): TSH, FREET4 in the last 168 hours.  Lab Results   Component Value Date    HGBA1C 5.8 (H) 01/05/2022       Nutritional status:   Body mass index is 28.11 kg/m².  Lab Results   Component Value Date    ALBUMIN 3.4 (L) 01/12/2022    ALBUMIN 2.9 (L) 01/11/2022    ALBUMIN 3.3 (L) 01/10/2022     No results found for: PREALBUMIN    Estimated Creatinine Clearance: 83.2 mL/min (based on SCr of 1.1 mg/dL).    Accu-Checks  Recent Labs     01/10/22  0757 01/10/22  1226 01/10/22  1733 01/10/22  2134 01/11/22  0848 01/11/22  1225 01/11/22  1742 01/11/22  2052 01/12/22  0736 01/12/22  1214   POCTGLUCOSE 145* 229* 213* 135* 114* 155* 186* 202* 144* 162*       Current Medications and/or Treatments Impacting Glycemic Control  Immunotherapy:    Immunosuppressants         Stop Route Frequency     tacrolimus capsule 2 mg         -- Oral 2 times daily     mycophenolate capsule 1,000 mg         -- Oral 2 times daily        Steroids:   Hormones (From admission, onward)            Start     Stop Route " "Frequency Ordered    01/12/22 0900  predniSONE tablet 20 mg  (methylprednisolone taper panel)        "Followed by" Linked Group Details    -- Oral Daily 01/06/22 1314        Pressors:    Autonomic Drugs (From admission, onward)            None        Hyperglycemia/Diabetes Medications:   Antihyperglycemics (From admission, onward)            Start     Stop Route Frequency Ordered    01/12/22 0900  insulin detemir U-100 pen 24 Units         -- SubQ Daily 01/12/22 0717    01/09/22 1130  insulin aspart U-100 pen 4-8 Units         -- SubQ 3 times daily with meals 01/09/22 0812    01/08/22 1047  insulin aspart U-100 pen 1-10 Units         -- SubQ Before meals & nightly PRN 01/08/22 0947        "

## 2022-01-12 NOTE — ASSESSMENT & PLAN NOTE
- H/O A fib restarted [facility] metoprolol and amiodarone (holding Coumadin).   - Initial plan was for Eliquis 1/10   - Converted to A-fib on 1/8  - Cardiology consulted for recurrent afib  -> Followed recs and increased Toprol to 50 BID 1/9 then 100 mg daily 1/10. Cont Amio at current dose (200 mg daily).  - Rate better controlled 1/10. No plan for cardioversion at this time, per cards.   - Eliquis restarted 1/11.  HR now 40-60s. Repeat EKG sinus cornel. Decreased Toprol to 50 mg daily with hold parameters.   - Still cornel in 40s 1/12 -> d/w cards. DC beta blocker and monitor.  - Will need outpatient cards/EP f/u.  - Monitor

## 2022-01-12 NOTE — PLAN OF CARE
Patient is AAOx3. [employer] IVANIA with staples, patient is painting the incision with betadine.  Patient refused a dulcolax suppository today. Blue card updated and meds filled. PT/OT are working with the patient. Patient sat up in the chair for a few hours today. Patient is SB on telemetry. Monitoring the patient's blood sugar AC&HS. Reminded the patient to call for assistance. Call light and personal items are within reach.

## 2022-01-12 NOTE — SUBJECTIVE & OBJECTIVE
"Interval HPI:   Overnight events: Remains in TSU. PITAON. Tentative discharge. BG reasonably well controlled with current regimen. Prednisone 20 mg daily.   Eating:  Diet diabetic [facility]; 2000 Calorie  Diet diabetic 25%   Nausea: No  Hypoglycemia and intervention: No  Fever: No  TPN and/or TF: No    /66   Pulse 76   Temp 98.3 °F (36.8 °C) (Oral)   Resp 18   Ht 6' 3" (1.905 m)   Wt 101.6 kg (223 lb 15.8 oz)   SpO2 95%   BMI 28.00 kg/m²     Labs Reviewed and Include    Recent Labs   Lab 01/13/22  0635   *   CALCIUM 8.6*   ALBUMIN 2.9*   PROT 5.1*   *   K 4.2   CO2 24      BUN 41*   CREATININE 0.9   ALKPHOS 54*   *   AST 31   BILITOT 0.9     Lab Results   Component Value Date    WBC 9.70 01/13/2022    HGB 12.2 (L) 01/13/2022    HCT 36.9 (L) 01/13/2022    MCV 92 01/13/2022     (L) 01/13/2022     No results for input(s): TSH, FREET4 in the last 168 hours.  Lab Results   Component Value Date    HGBA1C 5.8 (H) 01/05/2022       Nutritional status:   Body mass index is 28 kg/m².  Lab Results   Component Value Date    ALBUMIN 2.9 (L) 01/13/2022    ALBUMIN 3.4 (L) 01/12/2022    ALBUMIN 2.9 (L) 01/11/2022     No results found for: PREALBUMIN    Estimated Creatinine Clearance: 101.4 mL/min (based on SCr of 0.9 mg/dL).    Accu-Checks  Recent Labs     01/11/22  0848 01/11/22  1225 01/11/22  1742 01/11/22 2052 01/12/22  0736 01/12/22  1214 01/12/22  1721 01/12/22  2056 01/13/22  0753 01/13/22  1240   POCTGLUCOSE 114* 155* 186* 202* 144* 162* 165* 136* 146* 183*       Current Medications and/or Treatments Impacting Glycemic Control  Immunotherapy:    Immunosuppressants         Stop Route Frequency     tacrolimus capsule 2 mg         -- Oral 2 times daily     mycophenolate capsule 1,000 mg         -- Oral 2 times daily        Steroids:   Hormones (From admission, onward)            Start     Stop Route Frequency Ordered    01/12/22 0900  predniSONE tablet 20 mg  " "(methylprednisolone taper panel)        "Followed by" Linked Group Details    -- Oral Daily 01/06/22 1314        Pressors:    Autonomic Drugs (From admission, onward)            None        Hyperglycemia/Diabetes Medications:   Antihyperglycemics (From admission, onward)            Start     Stop Route Frequency Ordered    01/12/22 0900  insulin detemir U-100 pen 24 Units         -- SubQ Daily 01/12/22 0717    01/09/22 1130  insulin aspart U-100 pen 4-8 Units         -- SubQ 3 times daily with meals 01/09/22 0812    01/08/22 1047  insulin aspart U-100 pen 1-10 Units         -- SubQ Before meals & nightly PRN 01/08/22 0947        "

## 2022-01-12 NOTE — PROVIDER TRANSFER
Patient's HR remains in the 40s, all other VSS. Paige STEPHEN notified. No new orders at this time.

## 2022-01-12 NOTE — ASSESSMENT & PLAN NOTE
BG goal 140 - 180     - Levemir 24 units qd  - continue Novolog 4-8 TIDWM (Weight-based at 0.5 units/kg/day; started due prandial excursions).   - continue Novolog MDC (150/25) prn for BG excursions.   - BG checks AC/HS    ** Please call Endocrine for any BG related issues **  ** Please notify Endocrine for any change and/or advance in diet**  Lab Results   Component Value Date    HGBA1C 5.8 (H) 01/05/2022       Discharge planning: lantus 24 units daily and novolog 6 units with meals plus moderate dose correction scale 180/25. Reviewed s/sx of hypoglycemia and treatment. Reviewed and given bg logs at bedside. Denies questions- on insulin prior to transplant. BG logs in 1 week. Follow up in 1-2 weeks.

## 2022-01-12 NOTE — ASSESSMENT & PLAN NOTE
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 Replaced LHA, reconstructed.  - POD#1 US satisfactory  - Tolerating diet, ambulating, +flatus -BM.  - Added dulcolax and suppository to bowel regimen 1/11- able to have BM. Repeat suppository 1/12.   - Drains and irwin out.  - LFTs trending down. POD 5 Liver US satisfactory.   - Monitor

## 2022-01-12 NOTE — CARE UPDATE
BG goal 140-180    -A1C   Lab Results   Component Value Date    HGBA1C 5.8 (H) 01/05/2022         -HOME REGIMEN: Lantus 22 units HS  Metformin 1000 mg BID    -INPATIENT REGIMEN: levemir 28 units and novolog 4-8 units with meals     -GLUCOSE TREND FOR THE PAST 24HRS: 114-202    -NO HYPOGYCEMIAS NOTED     -TOLERATING % OF PO DIET     -Diet: Diet diabetic [facility]; 2000 Calorie    -Steroids - prednisone 20 mg today       Remains in U, [facility]. BG reasonably well controlled with current regimen.       Plan:  Decrease to levemir 24 units daily; FBG slightly below goal  Continue novolog 4-8 units with meals.   BG monitoring ac/hs and moderate dose correction scale.     Discharge planning:tbd     Endocrine to continue to follow    ** Please call Endocrine for any BG related issues **

## 2022-01-12 NOTE — SUBJECTIVE & OBJECTIVE
Scheduled Meds:   amiodarone  200 mg Oral Daily    apixaban  5 mg Oral BID    bisacodyL  5 mg Oral QHS    docusate sodium  100 mg Oral TID    insulin aspart U-100  4-8 Units Subcutaneous TIDWM    insulin detemir U-100  24 Units Subcutaneous Daily    metoprolol succinate  50 mg Oral Daily    mycophenolate  1,000 mg Oral BID    nystatin  500,000 Units Mouth/Throat TID PC    pantoprazole  40 mg Oral Daily    predniSONE  20 mg Oral Daily    sertraline  25 mg Oral Daily    [START ON 1/13/2022] sulfamethoxazole-trimethoprim 400-80mg  1 tablet Oral Daily AM    tacrolimus  2 mg Oral BID    tamsulosin  0.4 mg Oral QHS    [START ON 1/16/2022] valGANciclovir  450 mg Oral Daily     Continuous Infusions:  PRN Meds:aluminum & magnesium hydroxide-simethicone, bisacodyL, calcium carbonate, dextrose 50%, dextrose 50%, gabapentin, glucagon (human recombinant), glucose, glucose, insulin aspart U-100, oxyCODONE, oxyCODONE, sodium chloride 0.9%    Review of Systems   Constitutional: Negative for chills and fever.   HENT: Negative for facial swelling and trouble swallowing.    Eyes: Negative for photophobia and visual disturbance.   Respiratory: Negative for cough, shortness of breath and wheezing.    Cardiovascular: Negative for chest pain, palpitations and leg swelling.   Gastrointestinal: Positive for abdominal distention and abdominal pain. Negative for constipation, diarrhea, nausea and vomiting.        +indigestion   Genitourinary: Negative for decreased urine volume, difficulty urinating and dysuria.   Musculoskeletal: Negative for arthralgias.   Skin: Positive for wound.   Allergic/Immunologic: Positive for immunocompromised state.   Neurological: Positive for weakness and light-headedness. Negative for dizziness and tremors.   Hematological: Negative for adenopathy. Does not bruise/bleed easily.   Psychiatric/Behavioral: Positive for dysphoric mood and sleep disturbance. Negative for agitation, behavioral  problems, confusion and decreased concentration.     Objective:     Vital Signs (Most Recent):  Temp: 97.9 °F (36.6 °C) (01/12/22 1209)  Pulse: (!) 51 (01/12/22 1209)  Resp: 20 (01/12/22 1209)  BP: (!) 198/88 (01/12/22 1209)  SpO2: 95 % (01/12/22 1209) Vital Signs (24h Range):  Temp:  [97.7 °F (36.5 °C)-98.6 °F (37 °C)] 97.9 °F (36.6 °C)  Pulse:  [45-59] 51  Resp:  [16-20] 20  SpO2:  [92 %-96 %] 95 %  BP: (140-198)/(66-88) 198/88     Weight: 102 kg (224 lb 13.9 oz)  Body mass index is 28.11 kg/m².    Intake/Output - Last 3 Shifts       01/10 0700  01/11 0659 01/11 0700  01/12 0659 01/12 0700  01/13 0659    P.O. 610 1080     I.V. (mL/kg)  500 (4.9)     Total Intake(mL/kg) 610 (6.2) 1580 (15.5)     Urine (mL/kg/hr) 900 (0.4) 800 (0.3)     Emesis/NG output 0      Other 0      Stool 0 0     Blood 0      Total Output 900 800     Net -290 +780            Urine Occurrence 0 x 1 x     Stool Occurrence 0 x 1 x     Emesis Occurrence 0 x            Physical Exam  Vitals and nursing note reviewed.   Constitutional:       General: He is not in acute distress.     Appearance: He is well-developed.   HENT:      Head: Normocephalic.   Eyes:      General: No scleral icterus.        Right eye: No discharge.         Left eye: No discharge.      Conjunctiva/sclera: Conjunctivae normal.   Cardiovascular:      Rate and Rhythm: Regular rhythm. Bradycardia present.      Pulses: Normal pulses.      Heart sounds: Normal heart sounds.      Comments:    Pulmonary:      Effort: Pulmonary effort is normal. No respiratory distress.      Breath sounds: No wheezing or rales.   Abdominal:      General: Bowel sounds are normal. There is distension.      Palpations: Abdomen is soft.      Tenderness: There is abdominal tenderness. There is no guarding.          Comments: Staples CDI   R DANNIELLE drain sites sutured no s/s/i     Musculoskeletal:         General: Normal range of motion.      Cervical back: Normal range of motion.      Right lower leg: No  edema.      Left lower leg: No edema.   Skin:     General: Skin is warm and dry.      Coloration: Skin is not jaundiced.   Neurological:      General: No focal deficit present.      Mental Status: He is alert and oriented to person, place, and time.   Psychiatric:         Behavior: Behavior normal.         Thought Content: Thought content normal.         Judgment: Judgment normal.         Laboratory:  Immunosuppressants         Stop Route Frequency     tacrolimus capsule 2 mg         -- Oral 2 times daily     mycophenolate capsule 1,000 mg         -- Oral 2 times daily        CBC:   Recent Labs   Lab 01/12/22  0650   WBC 9.05   RBC 3.99*   HGB 11.8*   HCT 35.9*   *   MCV 90   MCH 29.6   MCHC 32.9     CMP:   Recent Labs   Lab 01/12/22  0650   *   CALCIUM 9.1   ALBUMIN 3.4*   PROT 5.8*      K 4.3   CO2 29      BUN 42*   CREATININE 1.1   ALKPHOS 60   *   AST 42*   BILITOT 1.1*     Labs within the past 24 hours have been reviewed.    Diagnostic Results:  I have personally reviewed all pertinent imaging studies.    Debility/Functional status: No debility noted.

## 2022-01-12 NOTE — PROGRESS NOTES
"[facility] - [facility]  Endocrinology  Progress Note    Admit Date: 1/5/2022     Reason for Consult: Management of T2DM, Hyperglycemia     Surgical Procedure and Date: Liver Transplant 01/06/2022    Diabetes diagnosis year: 2000    Home Diabetes Medications:  per Dr. CINDA Argueta   Lantus 22 units HS  Metformin 1000 mg BID    How often checking glucose at home?  YUSUF (intubated)    BG readings on regimen: YUSUF  Hypoglycemia on the regimen? YUSUF  Missed doses on regimen? YUSUF    Diabetes Complications include:     Hyperglycemia and Diabetic peripheral neuropathy     Complicating diabetes co morbidities:   CIRRHOSIS and Active Cancer (skin), PAD, CASTILLO, HLD,       HPI:   Patient is a 66 y.o. male with a diagnosis of Afib (on coumadin), CASTILLO cirrhosis, and HCC s/p TACE and RFA who is listed for liver transplant with MELD 24. He now presents to the hospital for liver transplant on 01/06/2022. Endocrinology consulted to manage glycemic control s/p transplant surgery.     Lab Results   Component Value Date    HGBA1C 5.8 (H) 01/05/2022                 Interval HPI:   Overnight events:  Remains in TSU. NAEON. Tentative discharge today but c/o pain - on hold. BG reasonably well controlled with current regimen. Prednisone 20 mg daily.   Eating:  Diet diabetic [facility]; 2000 Calorie 50%  Nausea: No  Hypoglycemia and intervention: No  Fever: No  TPN and/or TF: No    BP (!) 198/88 (BP Location: Right arm, Patient Position: Sitting)   Pulse (!) 51   Temp 97.9 °F (36.6 °C) (Oral)   Resp 20   Ht 6' 3" (1.905 m)   Wt 102 kg (224 lb 13.9 oz)   SpO2 95%   BMI 28.11 kg/m²     Labs Reviewed and Include    Recent Labs   Lab 01/12/22  0650   *   CALCIUM 9.1   ALBUMIN 3.4*   PROT 5.8*      K 4.3   CO2 29      BUN 42*   CREATININE 1.1   ALKPHOS 60   *   AST 42*   BILITOT 1.1*     Lab Results   Component Value Date    WBC 9.05 01/12/2022    HGB 11.8 (L) 01/12/2022    HCT 35.9 (L) 01/12/2022    MCV 90 " "01/12/2022     (L) 01/12/2022     No results for input(s): TSH, FREET4 in the last 168 hours.  Lab Results   Component Value Date    HGBA1C 5.8 (H) 01/05/2022       Nutritional status:   Body mass index is 28.11 kg/m².  Lab Results   Component Value Date    ALBUMIN 3.4 (L) 01/12/2022    ALBUMIN 2.9 (L) 01/11/2022    ALBUMIN 3.3 (L) 01/10/2022     No results found for: PREALBUMIN    Estimated Creatinine Clearance: 83.2 mL/min (based on SCr of 1.1 mg/dL).    Accu-Checks  Recent Labs     01/10/22  0757 01/10/22  1226 01/10/22  1733 01/10/22  2134 01/11/22  0848 01/11/22  1225 01/11/22  1742 01/11/22  2052 01/12/22  0736 01/12/22  1214   POCTGLUCOSE 145* 229* 213* 135* 114* 155* 186* 202* 144* 162*       Current Medications and/or Treatments Impacting Glycemic Control  Immunotherapy:    Immunosuppressants         Stop Route Frequency     tacrolimus capsule 2 mg         -- Oral 2 times daily     mycophenolate capsule 1,000 mg         -- Oral 2 times daily        Steroids:   Hormones (From admission, onward)            Start     Stop Route Frequency Ordered    01/12/22 0900  predniSONE tablet 20 mg  (methylprednisolone taper panel)        "Followed by" Linked Group Details    -- Oral Daily 01/06/22 1314        Pressors:    Autonomic Drugs (From admission, onward)            None        Hyperglycemia/Diabetes Medications:   Antihyperglycemics (From admission, onward)            Start     Stop Route Frequency Ordered    01/12/22 0900  insulin detemir U-100 pen 24 Units         -- SubQ Daily 01/12/22 0717    01/09/22 1130  insulin aspart U-100 pen 4-8 Units         -- SubQ 3 times daily with meals 01/09/22 0812    01/08/22 1047  insulin aspart U-100 pen 1-10 Units         -- SubQ Before meals & nightly PRN 01/08/22 0947          ASSESSMENT and PLAN    * S/P liver transplant  Managed per primary team  Avoid hypoglycemia        Type 2 diabetes mellitus  BG goal 140 - 180     - Levemir 24 units qd  - continue Novolog 4-8 " TIDWM (Weight-based at 0.5 units/kg/day; started due prandial excursions).   - continue Novolog MDC (150/25) prn for BG excursions.   - BG checks AC/HS    ** Please call Endocrine for any BG related issues **  ** Please notify Endocrine for any change and/or advance in diet**  Lab Results   Component Value Date    HGBA1C 5.8 (H) 01/05/2022       Discharge planning: TBD          Prophylactic immunotherapy  May increase insulin resistance.         Adverse effect of adrenal cortical steroids, sequela  On steroid therapy per transplant team; may elevate BG readings            Lizet Rivero NP  Endocrinology  [facility] - [facility]

## 2022-01-13 ENCOUNTER — PATIENT MESSAGE (OUTPATIENT)
Dept: ENDOCRINOLOGY | Facility: HOSPITAL | Age: 67
End: 2022-01-13
Payer: MEDICARE

## 2022-01-13 VITALS
OXYGEN SATURATION: 95 % | TEMPERATURE: 98 F | RESPIRATION RATE: 18 BRPM | DIASTOLIC BLOOD PRESSURE: 66 MMHG | HEIGHT: 75 IN | WEIGHT: 224 LBS | SYSTOLIC BLOOD PRESSURE: 138 MMHG | HEART RATE: 76 BPM | BODY MASS INDEX: 27.85 KG/M2

## 2022-01-13 DIAGNOSIS — Z94.4 LIVER REPLACED BY TRANSPLANT: Primary | ICD-10-CM

## 2022-01-13 DIAGNOSIS — E83.42 HYPOMAGNESEMIA: ICD-10-CM

## 2022-01-13 PROBLEM — I48.91 ATRIAL FIBRILLATION WITH RVR: Status: RESOLVED | Noted: 2021-05-19 | Resolved: 2022-01-13

## 2022-01-13 PROBLEM — D62 ACUTE BLOOD LOSS ANEMIA: Status: RESOLVED | Noted: 2022-01-10 | Resolved: 2022-01-13

## 2022-01-13 LAB
ALBUMIN SERPL BCP-MCNC: 2.9 G/DL (ref 3.5–5.2)
ALP SERPL-CCNC: 54 U/L (ref 55–135)
ALT SERPL W/O P-5'-P-CCNC: 114 U/L (ref 10–44)
ANION GAP SERPL CALC-SCNC: 9 MMOL/L (ref 8–16)
APTT BLDCRRT: 25.6 SEC (ref 21–32)
AST SERPL-CCNC: 31 U/L (ref 10–40)
BASOPHILS # BLD AUTO: 0.04 K/UL (ref 0–0.2)
BASOPHILS NFR BLD: 0.4 % (ref 0–1.9)
BILIRUB SERPL-MCNC: 0.9 MG/DL (ref 0.1–1)
BUN SERPL-MCNC: 41 MG/DL (ref 8–23)
CALCIUM SERPL-MCNC: 8.6 MG/DL (ref 8.7–10.5)
CHLORIDE SERPL-SCNC: 101 MMOL/L (ref 95–110)
CO2 SERPL-SCNC: 24 MMOL/L (ref 23–29)
CREAT SERPL-MCNC: 0.9 MG/DL (ref 0.5–1.4)
DIFFERENTIAL METHOD: ABNORMAL
EOSINOPHIL # BLD AUTO: 0.2 K/UL (ref 0–0.5)
EOSINOPHIL NFR BLD: 2.5 % (ref 0–8)
ERYTHROCYTE [DISTWIDTH] IN BLOOD BY AUTOMATED COUNT: 14 % (ref 11.5–14.5)
EST. GFR  (AFRICAN AMERICAN): >60 ML/MIN/1.73 M^2
EST. GFR  (NON AFRICAN AMERICAN): >60 ML/MIN/1.73 M^2
GLUCOSE SERPL-MCNC: 136 MG/DL (ref 70–110)
HCT VFR BLD AUTO: 36.9 % (ref 40–54)
HGB BLD-MCNC: 12.2 G/DL (ref 14–18)
IMM GRANULOCYTES # BLD AUTO: 0.41 K/UL (ref 0–0.04)
IMM GRANULOCYTES NFR BLD AUTO: 4.2 % (ref 0–0.5)
INR PPP: 1.2 (ref 0.8–1.2)
LYMPHOCYTES # BLD AUTO: 0.6 K/UL (ref 1–4.8)
LYMPHOCYTES NFR BLD: 6.2 % (ref 18–48)
MAGNESIUM SERPL-MCNC: 1.8 MG/DL (ref 1.6–2.6)
MCH RBC QN AUTO: 30.3 PG (ref 27–31)
MCHC RBC AUTO-ENTMCNC: 33.1 G/DL (ref 32–36)
MCV RBC AUTO: 92 FL (ref 82–98)
MONOCYTES # BLD AUTO: 1.1 K/UL (ref 0.3–1)
MONOCYTES NFR BLD: 11.2 % (ref 4–15)
NEUTROPHILS # BLD AUTO: 7.3 K/UL (ref 1.8–7.7)
NEUTROPHILS NFR BLD: 75.5 % (ref 38–73)
NRBC BLD-RTO: 0 /100 WBC
PHOSPHATE SERPL-MCNC: 3.2 MG/DL (ref 2.7–4.5)
PLATELET # BLD AUTO: 109 K/UL (ref 150–450)
PMV BLD AUTO: 11.9 FL (ref 9.2–12.9)
POCT GLUCOSE: 146 MG/DL (ref 70–110)
POCT GLUCOSE: 183 MG/DL (ref 70–110)
POTASSIUM SERPL-SCNC: 4.2 MMOL/L (ref 3.5–5.1)
PROT SERPL-MCNC: 5.1 G/DL (ref 6–8.4)
PROTHROMBIN TIME: 12.9 SEC (ref 9–12.5)
RBC # BLD AUTO: 4.02 M/UL (ref 4.6–6.2)
SODIUM SERPL-SCNC: 134 MMOL/L (ref 136–145)
TACROLIMUS BLD-MCNC: 8.6 NG/ML (ref 5–15)
WBC # BLD AUTO: 9.7 K/UL (ref 3.9–12.7)

## 2022-01-13 PROCEDURE — 83735 ASSAY OF MAGNESIUM: CPT | Performed by: NURSE PRACTITIONER

## 2022-01-13 PROCEDURE — 85025 COMPLETE CBC W/AUTO DIFF WBC: CPT | Performed by: STUDENT IN AN ORGANIZED HEALTH CARE EDUCATION/TRAINING PROGRAM

## 2022-01-13 PROCEDURE — 25000003 PHARM REV CODE 250: Performed by: PHYSICIAN ASSISTANT

## 2022-01-13 PROCEDURE — 25000003 PHARM REV CODE 250: Performed by: STUDENT IN AN ORGANIZED HEALTH CARE EDUCATION/TRAINING PROGRAM

## 2022-01-13 PROCEDURE — 85610 PROTHROMBIN TIME: CPT | Performed by: STUDENT IN AN ORGANIZED HEALTH CARE EDUCATION/TRAINING PROGRAM

## 2022-01-13 PROCEDURE — 80197 ASSAY OF TACROLIMUS: CPT | Performed by: STUDENT IN AN ORGANIZED HEALTH CARE EDUCATION/TRAINING PROGRAM

## 2022-01-13 PROCEDURE — 63600175 PHARM REV CODE 636 W HCPCS: Performed by: PHYSICIAN ASSISTANT

## 2022-01-13 PROCEDURE — 63600175 PHARM REV CODE 636 W HCPCS: Performed by: STUDENT IN AN ORGANIZED HEALTH CARE EDUCATION/TRAINING PROGRAM

## 2022-01-13 PROCEDURE — 84100 ASSAY OF PHOSPHORUS: CPT | Performed by: PHYSICIAN ASSISTANT

## 2022-01-13 PROCEDURE — 97116 GAIT TRAINING THERAPY: CPT

## 2022-01-13 PROCEDURE — 80053 COMPREHEN METABOLIC PANEL: CPT | Performed by: STUDENT IN AN ORGANIZED HEALTH CARE EDUCATION/TRAINING PROGRAM

## 2022-01-13 PROCEDURE — 85730 THROMBOPLASTIN TIME PARTIAL: CPT | Performed by: STUDENT IN AN ORGANIZED HEALTH CARE EDUCATION/TRAINING PROGRAM

## 2022-01-13 PROCEDURE — 99232 SBSQ HOSP IP/OBS MODERATE 35: CPT | Mod: ,,, | Performed by: NURSE PRACTITIONER

## 2022-01-13 PROCEDURE — 99232 PR SUBSEQUENT HOSPITAL CARE,LEVL II: ICD-10-PCS | Mod: ,,, | Performed by: NURSE PRACTITIONER

## 2022-01-13 PROCEDURE — 99239 HOSP IP/OBS DSCHRG MGMT >30: CPT | Mod: 24,,, | Performed by: PHYSICIAN ASSISTANT

## 2022-01-13 PROCEDURE — 99239 PR HOSPITAL DISCHARGE DAY,>30 MIN: ICD-10-PCS | Mod: 24,,, | Performed by: PHYSICIAN ASSISTANT

## 2022-01-13 RX ORDER — TAMSULOSIN HYDROCHLORIDE 0.4 MG/1
0.4 CAPSULE ORAL NIGHTLY
Qty: 30 CAPSULE | Refills: 1 | Status: SHIPPED | OUTPATIENT
Start: 2022-01-13 | End: 2022-05-09 | Stop reason: SDUPTHER

## 2022-01-13 RX ORDER — GABAPENTIN 300 MG/1
300 CAPSULE ORAL 3 TIMES DAILY PRN
Qty: 90 CAPSULE | Refills: 5 | Status: SHIPPED | OUTPATIENT
Start: 2022-01-13 | End: 2022-10-03

## 2022-01-13 RX ORDER — OXYCODONE HYDROCHLORIDE 10 MG/1
10 TABLET ORAL EVERY 4 HOURS PRN
Qty: 30 TABLET | Refills: 0 | Status: SHIPPED | OUTPATIENT
Start: 2022-01-13 | End: 2022-03-30

## 2022-01-13 RX ORDER — AMLODIPINE BESYLATE 10 MG/1
10 TABLET ORAL DAILY
Qty: 30 TABLET | Refills: 11 | Status: SHIPPED | OUTPATIENT
Start: 2022-01-14 | End: 2022-04-01

## 2022-01-13 RX ORDER — AMIODARONE HYDROCHLORIDE 200 MG/1
200 TABLET ORAL DAILY
Qty: 30 TABLET | Refills: 11 | Status: SHIPPED | OUTPATIENT
Start: 2022-01-13 | End: 2023-03-08 | Stop reason: SDUPTHER

## 2022-01-13 RX ORDER — ACETAMINOPHEN 500 MG
1 TABLET ORAL DAILY
Qty: 30 TABLET | Refills: 5 | Status: ON HOLD | OUTPATIENT
Start: 2022-01-13 | End: 2024-01-30 | Stop reason: CLARIF

## 2022-01-13 RX ORDER — INSULIN GLARGINE 100 [IU]/ML
24 INJECTION, SOLUTION SUBCUTANEOUS NIGHTLY
Qty: 9 ML | Refills: 11 | Status: SHIPPED | OUTPATIENT
Start: 2022-01-13 | End: 2022-11-25

## 2022-01-13 RX ORDER — PANTOPRAZOLE SODIUM 40 MG/1
40 TABLET, DELAYED RELEASE ORAL DAILY
Qty: 30 TABLET | Refills: 5 | Status: SHIPPED | OUTPATIENT
Start: 2022-01-13 | End: 2022-07-13 | Stop reason: SDUPTHER

## 2022-01-13 RX ORDER — TACROLIMUS 1 MG/1
2 CAPSULE ORAL EVERY 12 HOURS
Qty: 120 CAPSULE | Refills: 11 | Status: SHIPPED | OUTPATIENT
Start: 2022-01-13 | End: 2022-01-15

## 2022-01-13 RX ORDER — AMLODIPINE BESYLATE 10 MG/1
10 TABLET ORAL DAILY
Status: DISCONTINUED | OUTPATIENT
Start: 2022-01-13 | End: 2022-01-13 | Stop reason: HOSPADM

## 2022-01-13 RX ORDER — CALCIUM CARBONATE 200(500)MG
500 TABLET,CHEWABLE ORAL 3 TIMES DAILY PRN
Qty: 90 TABLET | Refills: 5 | Status: SHIPPED | OUTPATIENT
Start: 2022-01-13 | End: 2023-12-11 | Stop reason: SDUPTHER

## 2022-01-13 RX ORDER — SENNOSIDES 8.6 MG/1
8.6 TABLET ORAL DAILY PRN
Status: DISCONTINUED | OUTPATIENT
Start: 2022-01-13 | End: 2022-01-13 | Stop reason: HOSPADM

## 2022-01-13 RX ORDER — SERTRALINE HYDROCHLORIDE 25 MG/1
25 TABLET, FILM COATED ORAL DAILY
Qty: 30 TABLET | Refills: 5 | Status: SHIPPED | OUTPATIENT
Start: 2022-01-13 | End: 2023-08-17 | Stop reason: SDUPTHER

## 2022-01-13 RX ORDER — INSULIN LISPRO 100 [IU]/ML
6 INJECTION, SOLUTION INTRAVENOUS; SUBCUTANEOUS 3 TIMES DAILY
Qty: 15 ML | Refills: 4 | Status: SHIPPED | OUTPATIENT
Start: 2022-01-13 | End: 2023-03-08 | Stop reason: SDUPTHER

## 2022-01-13 RX ADMIN — INSULIN ASPART 4 UNITS: 100 INJECTION, SOLUTION INTRAVENOUS; SUBCUTANEOUS at 07:01

## 2022-01-13 RX ADMIN — SENNOSIDES 8.6 MG: 8.6 TABLET, FILM COATED ORAL at 10:01

## 2022-01-13 RX ADMIN — TACROLIMUS 2 MG: 1 CAPSULE ORAL at 07:01

## 2022-01-13 RX ADMIN — SERTRALINE HYDROCHLORIDE 25 MG: 25 TABLET ORAL at 07:01

## 2022-01-13 RX ADMIN — BISACODYL 5 MG: 5 TABLET, COATED ORAL at 10:01

## 2022-01-13 RX ADMIN — INSULIN ASPART 4 UNITS: 100 INJECTION, SOLUTION INTRAVENOUS; SUBCUTANEOUS at 12:01

## 2022-01-13 RX ADMIN — PREDNISONE 20 MG: 20 TABLET ORAL at 08:01

## 2022-01-13 RX ADMIN — AMIODARONE HYDROCHLORIDE 200 MG: 200 TABLET ORAL at 07:01

## 2022-01-13 RX ADMIN — MYCOPHENOLATE MOFETIL 1000 MG: 250 CAPSULE ORAL at 08:01

## 2022-01-13 RX ADMIN — PANTOPRAZOLE SODIUM 40 MG: 40 TABLET, DELAYED RELEASE ORAL at 07:01

## 2022-01-13 RX ADMIN — SULFAMETHOXAZOLE AND TRIMETHOPRIM 1 TABLET: 400; 80 TABLET ORAL at 07:01

## 2022-01-13 RX ADMIN — APIXABAN 5 MG: 5 TABLET, FILM COATED ORAL at 07:01

## 2022-01-13 RX ADMIN — DOCUSATE SODIUM 100 MG: 100 CAPSULE ORAL at 07:01

## 2022-01-13 RX ADMIN — INSULIN ASPART 2 UNITS: 100 INJECTION, SOLUTION INTRAVENOUS; SUBCUTANEOUS at 12:01

## 2022-01-13 RX ADMIN — AMLODIPINE BESYLATE 10 MG: 10 TABLET ORAL at 09:01

## 2022-01-13 NOTE — PT/OT/SLP PROGRESS
Physical Therapy Treatment    Patient Name:  Tej Purdy   MRN:  [id number]    Recommendations:     Discharge Recommendations:  home   Discharge Equipment Recommendations: none   Barriers to discharge: None    Assessment:     Tej Purdy is a 66 y.o. male admitted with a medical diagnosis of S/P liver transplant.  He presents with the following impairments/functional limitations:  weakness,impaired endurance,impaired self care skills,impaired functional mobilty,gait instability,impaired balance Pt. cooperative and tolerated treatment well. Pt. progressing with mobility.    Rehab Prognosis: Good; patient would benefit from acute skilled PT services to address these deficits and reach maximum level of function.    Recent Surgery: Procedure(s) (LRB):  TRANSPLANT, LIVER (N/A) 7 Days Post-Op    Plan:     During this hospitalization, patient to be seen 4 x/week to address the identified rehab impairments via gait training,therapeutic activities,therapeutic exercises and progress toward the following goals:    · Plan of Care Expires:  02/06/22    Subjective     Chief Complaint: weakness  Patient/Family Comments/goals: to go home  Pain/Comfort:  · Pain Rating 1:  (pt. did not rate)      Objective:     Communicated with nursing prior to session.  Patient found supine with peripheral IV,telemetry upon PT entry to room.     General Precautions: Standard, fall   Orthopedic Precautions:N/A   Braces: N/A  Respiratory Status: Room air     Functional Mobility:  · Bed Mobility:     · Rolling Left:  supervision  · Scooting: supervision  · Supine to Sit: supervision  · Sit to Supine: supervision  · Transfers:     · Sit to Stand:  supervision with no AD  · Gait: 75' with Supervision without AD or LOB. Pt. amb. with decreased step length/mik  · Balance: fair+  · Stairs:  Pt ascended/descended 11 stair(s) with No Assistive Device with bilateral handrails with Stand-by Assistance.       AM-PAC 6 CLICK MOBILITY  Turning over in  bed (including adjusting bedclothes, sheets and blankets)?: 4  Sitting down on and standing up from a chair with arms (e.g., wheelchair, bedside commode, etc.): 4  Moving from lying on back to sitting on the side of the bed?: 4  Moving to and from a bed to a chair (including a wheelchair)?: 4  Need to walk in hospital room?: 3  Climbing 3-5 steps with a railing?: 3  Basic Mobility Total Score: 22       Therapeutic Activities and Exercises:   Pt. amb. to bathroom and stood to use urinal on his own. Pt. then amb. in hallway towards stairs, but required to sit on rolling office chair due to fatigue. Pt. transported to stairs via rolling office chair. Pt. Ascended 11 stairs before he needed to sit on stairs due to fatigue. Pt. descended stairs and was transported back to his room via rolling office chair.    Patient left supine with all lines intact and call button in reach..    GOALS:   Multidisciplinary Problems     Physical Therapy Goals        Problem: Physical Therapy Goal    Goal Priority Disciplines Outcome Goal Variances Interventions   Physical Therapy Goal     PT, PT/OT Ongoing, Progressing     Description: Goals to be met by: 2022     Patient will increase functional independence with mobility by performin. Sit to stand transfer with Modified [place]  2. Bed to chair transfer with Supervision using LRAD  3. Gait  x 250 feet with Supervision using LRAD and standing rest breaks prn.   4. Ascend/descend 19 stair with left Handrails Stand-by Assistance using LRAD.   5. Lower extremity exercise program x30 reps per handout, with independence                     Time Tracking:     PT Received On: 22  PT Start Time: 1015     PT Stop Time: 1038  PT Total Time (min): 23 min     Billable Minutes: Gait Training 23    Treatment Type: Treatment  PT/PTA: PT     PTA Visit Number: 0     2022

## 2022-01-13 NOTE — PROGRESS NOTES
Met with patient and wife in preparation for discharge today.  Reviewed their answers to the questions in [facility].  One question was missed.  Rationale for correct answer given.  These questions cover: post op care, medication management, infection prevention and emergency contacts.  Outpatient coordinator assigned. Outpatient appointments reviewed.  Allowed time for questions and answers.

## 2022-01-13 NOTE — PLAN OF CARE
Pt has call bell in reach, non slip socks on, and bedrails up x2.  Pt encouraged to wash hands.  Pt on tele monitoring with sinus bradycardia and dr are aware and adjusted the heart po medications.  Pt on maalox and tums for acid reflux. I have him sitting up in bed too.  Pt on self meds.

## 2022-01-13 NOTE — PROGRESS NOTES
Social Work Discharge Note    Social Work met with patient to assess for discharge plan and any concerns or needs. Patient presented sitting up in bed and alert and oriented x 4. Patient reported his significant other Aniya  will be transporting patient. Patient will discharge home today with no needs. Patient reported understanding discharge process and is without any psychosocial concerns or questions at this time. Social work will continue to follow and remains available.

## 2022-01-13 NOTE — NURSING
"Pt offered x 3 for assistance with showering. Pt adamantly refused shower stating "I'm going home. I want to take a shower at home."  "

## 2022-01-13 NOTE — PROGRESS NOTES
"[facility] - [facility]plant Stepdown  Endocrinology  Progress Note    Admit Date: 1/5/2022     Reason for Consult: Management of T2DM, Hyperglycemia     Surgical Procedure and Date: Liver Transplant 01/06/2022    Diabetes diagnosis year: 2000    Home Diabetes Medications:  per Dr. CINDA Argueta   Lanankitus 22 units HS  Metformin 1000 mg BID    How often checking glucose at home?  YUSUF (intubated)    BG readings on regimen: YUSUF  Hypoglycemia on the regimen? YUSUF  Missed doses on regimen? YUSUF    Diabetes Complications include:     Hyperglycemia and Diabetic peripheral neuropathy     Complicating diabetes co morbidities:   CIRRHOSIS and Active Cancer (skin), PAD, CASTILLO, HLD,       HPI:   Patient is a 66 y.o. male with a diagnosis of Afib (on coumadin), CASTILLO cirrhosis, and HCC s/p TACE and RFA who is listed for liver transplant with MELD 24. He now presents to the hospital for liver transplant on 01/06/2022. Endocrinology consulted to manage glycemic control s/p transplant surgery.     Lab Results   Component Value Date    HGBA1C 5.8 (H) 01/05/2022                 Interval HPI:   Overnight events: Remains in TSU. NAEON. Tentative discharge. BG reasonably well controlled with current regimen. Prednisone 20 mg daily.   Eating:  Diet diabetic [facility]; 2000 Calorie  Diet diabetic 25%   Nausea: No  Hypoglycemia and intervention: No  Fever: No  TPN and/or TF: No    /66   Pulse 76   Temp 98.3 °F (36.8 °C) (Oral)   Resp 18   Ht 6' 3" (1.905 m)   Wt 101.6 kg (223 lb 15.8 oz)   SpO2 95%   BMI 28.00 kg/m²     Labs Reviewed and Include    Recent Labs   Lab 01/13/22  0635   *   CALCIUM 8.6*   ALBUMIN 2.9*   PROT 5.1*   *   K 4.2   CO2 24      BUN 41*   CREATININE 0.9   ALKPHOS 54*   *   AST 31   BILITOT 0.9     Lab Results   Component Value Date    WBC 9.70 01/13/2022    HGB 12.2 (L) 01/13/2022    HCT 36.9 (L) 01/13/2022    MCV 92 01/13/2022     (L) 01/13/2022     No results for input(s): TSH, " "FREET4 in the last 168 hours.  Lab Results   Component Value Date    HGBA1C 5.8 (H) 01/05/2022       Nutritional status:   Body mass index is 28 kg/m².  Lab Results   Component Value Date    ALBUMIN 2.9 (L) 01/13/2022    ALBUMIN 3.4 (L) 01/12/2022    ALBUMIN 2.9 (L) 01/11/2022     No results found for: PREALBUMIN    Estimated Creatinine Clearance: 101.4 mL/min (based on SCr of 0.9 mg/dL).    Accu-Checks  Recent Labs     01/11/22  0848 01/11/22  1225 01/11/22  1742 01/11/22 2052 01/12/22  0736 01/12/22  1214 01/12/22  1721 01/12/22 2056 01/13/22  0753 01/13/22  1240   POCTGLUCOSE 114* 155* 186* 202* 144* 162* 165* 136* 146* 183*       Current Medications and/or Treatments Impacting Glycemic Control  Immunotherapy:    Immunosuppressants         Stop Route Frequency     tacrolimus capsule 2 mg         -- Oral 2 times daily     mycophenolate capsule 1,000 mg         -- Oral 2 times daily        Steroids:   Hormones (From admission, onward)            Start     Stop Route Frequency Ordered    01/12/22 0900  predniSONE tablet 20 mg  (methylprednisolone taper panel)        "Followed by" Linked Group Details    -- Oral Daily 01/06/22 1314        Pressors:    Autonomic Drugs (From admission, onward)            None        Hyperglycemia/Diabetes Medications:   Antihyperglycemics (From admission, onward)            Start     Stop Route Frequency Ordered    01/12/22 0900  insulin detemir U-100 pen 24 Units         -- SubQ Daily 01/12/22 0717    01/09/22 1130  insulin aspart U-100 pen 4-8 Units         -- SubQ 3 times daily with meals 01/09/22 0812    01/08/22 1047  insulin aspart U-100 pen 1-10 Units         -- SubQ Before meals & nightly PRN 01/08/22 0947          ASSESSMENT and PLAN    * S/P liver transplant  Managed per primary team  Avoid hypoglycemia        Type 2 diabetes mellitus  BG goal 140 - 180     - Levemir 24 units qd  - continue Novolog 4-8 TIDWM (Weight-based at 0.5 units/kg/day; started due prandial " excursions).   - continue Novolog [facility] [place] [place] (150/25) prn for BG excursions.   - BG checks AC/HS    ** Please call Endocrine for any BG related issues **  ** Please notify Endocrine for any change and/or advance in diet**  Lab Results   Component Value Date    HGBA1C 5.8 (H) 01/05/2022       Discharge planning: lantus 24 units daily and novolog 6 units with meals plus moderate dose correction scale 180/25. Reviewed s/sx of hypoglycemia and treatment. Reviewed and given bg logs at bedside. Denies questions- on insulin prior to transplant. BG logs in 1 week. Follow up in 1-2 weeks.           Prophylactic immunotherapy  May increase insulin resistance.         Adverse effect of adrenal cortical steroids, sequela  On steroid therapy per transplant team; may elevate BG readings            Lizet Rivero NP  Endocrinology  [facility] - Transplant Stepdown

## 2022-01-14 ENCOUNTER — PATIENT MESSAGE (OUTPATIENT)
Dept: TRANSPLANT | Facility: CLINIC | Age: 67
End: 2022-01-14

## 2022-01-14 ENCOUNTER — CLINICAL SUPPORT (OUTPATIENT)
Dept: TRANSPLANT | Facility: CLINIC | Age: 67
End: 2022-01-14
Payer: MEDICARE

## 2022-01-14 ENCOUNTER — LAB VISIT (OUTPATIENT)
Dept: LAB | Facility: HOSPITAL | Age: 67
End: 2022-01-14
Attending: SURGERY
Payer: MEDICARE

## 2022-01-14 ENCOUNTER — TELEPHONE (OUTPATIENT)
Dept: TRANSPLANT | Facility: CLINIC | Age: 67
End: 2022-01-14

## 2022-01-14 ENCOUNTER — PATIENT OUTREACH (OUTPATIENT)
Dept: ADMINISTRATIVE | Facility: CLINIC | Age: 67
End: 2022-01-14
Payer: MEDICARE

## 2022-01-14 VITALS
HEART RATE: 97 BPM | HEIGHT: 72 IN | RESPIRATION RATE: 16 BRPM | WEIGHT: 225.75 LBS | BODY MASS INDEX: 30.58 KG/M2 | TEMPERATURE: 97 F | WEIGHT: 225.75 LBS | RESPIRATION RATE: 16 BRPM | DIASTOLIC BLOOD PRESSURE: 74 MMHG | DIASTOLIC BLOOD PRESSURE: 74 MMHG | BODY MASS INDEX: 30.58 KG/M2 | SYSTOLIC BLOOD PRESSURE: 155 MMHG | OXYGEN SATURATION: 96 % | TEMPERATURE: 97 F | HEART RATE: 97 BPM | SYSTOLIC BLOOD PRESSURE: 155 MMHG | OXYGEN SATURATION: 96 % | HEIGHT: 72 IN

## 2022-01-14 DIAGNOSIS — Z94.4 LIVER REPLACED BY TRANSPLANT: ICD-10-CM

## 2022-01-14 LAB
ALBUMIN SERPL BCP-MCNC: 3.3 G/DL (ref 3.5–5.2)
ALP SERPL-CCNC: 64 U/L (ref 55–135)
ALT SERPL W/O P-5'-P-CCNC: 101 U/L (ref 10–44)
ANION GAP SERPL CALC-SCNC: 10 MMOL/L (ref 8–16)
ANISOCYTOSIS BLD QL SMEAR: SLIGHT
AST SERPL-CCNC: 31 U/L (ref 10–40)
BASOPHILS # BLD AUTO: ABNORMAL K/UL (ref 0–0.2)
BASOPHILS NFR BLD: 1 % (ref 0–1.9)
BILIRUB DIRECT SERPL-MCNC: 0.5 MG/DL (ref 0.1–0.3)
BILIRUB SERPL-MCNC: 1.2 MG/DL (ref 0.1–1)
BUN SERPL-MCNC: 38 MG/DL (ref 8–23)
CALCIUM SERPL-MCNC: 8.7 MG/DL (ref 8.7–10.5)
CHLORIDE SERPL-SCNC: 99 MMOL/L (ref 95–110)
CO2 SERPL-SCNC: 26 MMOL/L (ref 23–29)
CREAT SERPL-MCNC: 1.1 MG/DL (ref 0.5–1.4)
DIFFERENTIAL METHOD: ABNORMAL
EOSINOPHIL # BLD AUTO: ABNORMAL K/UL (ref 0–0.5)
EOSINOPHIL NFR BLD: 1 % (ref 0–8)
ERYTHROCYTE [DISTWIDTH] IN BLOOD BY AUTOMATED COUNT: 14.1 % (ref 11.5–14.5)
EST. GFR  (AFRICAN AMERICAN): >60 ML/MIN/1.73 M^2
EST. GFR  (NON AFRICAN AMERICAN): >60 ML/MIN/1.73 M^2
GLUCOSE SERPL-MCNC: 126 MG/DL (ref 70–110)
HCT VFR BLD AUTO: 37.8 % (ref 40–54)
HGB BLD-MCNC: 12.7 G/DL (ref 14–18)
HYPOCHROMIA BLD QL SMEAR: ABNORMAL
IMM GRANULOCYTES # BLD AUTO: ABNORMAL K/UL (ref 0–0.04)
IMM GRANULOCYTES NFR BLD AUTO: ABNORMAL % (ref 0–0.5)
LYMPHOCYTES # BLD AUTO: ABNORMAL K/UL (ref 1–4.8)
LYMPHOCYTES NFR BLD: 6 % (ref 18–48)
MCH RBC QN AUTO: 30.2 PG (ref 27–31)
MCHC RBC AUTO-ENTMCNC: 33.6 G/DL (ref 32–36)
MCV RBC AUTO: 90 FL (ref 82–98)
MONOCYTES # BLD AUTO: ABNORMAL K/UL (ref 0.3–1)
MONOCYTES NFR BLD: 5 % (ref 4–15)
NEUTROPHILS NFR BLD: 86 % (ref 38–73)
NEUTS BAND NFR BLD MANUAL: 1 %
NRBC BLD-RTO: 0 /100 WBC
OVALOCYTES BLD QL SMEAR: ABNORMAL
PLATELET # BLD AUTO: 171 K/UL (ref 150–450)
PMV BLD AUTO: 11.3 FL (ref 9.2–12.9)
POIKILOCYTOSIS BLD QL SMEAR: SLIGHT
POLYCHROMASIA BLD QL SMEAR: ABNORMAL
POTASSIUM SERPL-SCNC: 3.9 MMOL/L (ref 3.5–5.1)
PROT SERPL-MCNC: 5.4 G/DL (ref 6–8.4)
RBC # BLD AUTO: 4.2 M/UL (ref 4.6–6.2)
SODIUM SERPL-SCNC: 135 MMOL/L (ref 136–145)
TACROLIMUS BLD-MCNC: 16.9 NG/ML (ref 5–15)
WBC # BLD AUTO: 11.47 K/UL (ref 3.9–12.7)

## 2022-01-14 PROCEDURE — 85007 BL SMEAR W/DIFF WBC COUNT: CPT | Performed by: SURGERY

## 2022-01-14 PROCEDURE — 36415 COLL VENOUS BLD VENIPUNCTURE: CPT | Performed by: SURGERY

## 2022-01-14 PROCEDURE — 82248 BILIRUBIN DIRECT: CPT | Performed by: SURGERY

## 2022-01-14 PROCEDURE — 80197 ASSAY OF TACROLIMUS: CPT | Performed by: SURGERY

## 2022-01-14 PROCEDURE — 99999 PR PBB SHADOW E&M-EST. PATIENT-LVL II: ICD-10-PCS | Mod: PBBFAC,,,

## 2022-01-14 PROCEDURE — 85027 COMPLETE CBC AUTOMATED: CPT | Performed by: SURGERY

## 2022-01-14 PROCEDURE — 99999 PR PBB SHADOW E&M-EST. PATIENT-LVL IV: ICD-10-PCS | Mod: PBBFAC,,,

## 2022-01-14 PROCEDURE — 99999 PR PBB SHADOW E&M-EST. PATIENT-LVL II: CPT | Mod: PBBFAC,,,

## 2022-01-14 PROCEDURE — 99999 PR PBB SHADOW E&M-EST. PATIENT-LVL IV: CPT | Mod: PBBFAC,,,

## 2022-01-14 PROCEDURE — 80053 COMPREHEN METABOLIC PANEL: CPT | Performed by: SURGERY

## 2022-01-14 NOTE — PROGRESS NOTES
Clinic Note: First Return to Clinic Post-  Liver Transplant    Tej Purdy  is a 66 y.o. male  S/p   LIVER transplant on 1/6/2022 (Liver) for Primary Liver Malignancy: Hepatoma (HCC) and Cirrhosis.      Discharge Course (Issues/Concerns):none    Objective:   There were no vitals filed for this visit.    Met with patient and his caregiver in the clinic to review current medication list.     Current Outpatient Medications   Medication Sig Dispense Refill    amiodarone (PACERONE) 200 MG Tab Take 1 tablet (200 mg total) by mouth once daily. 30 tablet 11    amLODIPine (NORVASC) 10 MG tablet Take 1 tablet (10 mg total) by mouth once daily. 30 tablet 11    apixaban (ELIQUIS) 5 mg Tab Take 1 tablet (5 mg total) by mouth 2 (two) times daily. 60 tablet 11    aspirin (ECOTRIN) 81 MG EC tablet Take 81 mg by mouth once daily.      blood sugar diagnostic Strp To check BG 2 times daily, to use with insurance preferred meter 100 strip 11    blood-glucose meter kit To check BG 2 times daily, to use with insurance preferred meter 1 each 0    calcium carbonate (TUMS) 200 mg calcium (500 mg) chewable tablet Take 1 tablet (500 mg total) by mouth 3 (three) times daily as needed for Heartburn. 90 tablet 5    calcium carbonate-vitamin D3 600 mg-20 mcg (800 unit) Tab Take 1 tablet by mouth once daily. 30 tablet 5    gabapentin (NEURONTIN) 300 MG capsule Take 1 capsule (300 mg total) by mouth 3 (three) times daily as needed (neuropathy). 90 capsule 5    insulin (LANTUS SOLOSTAR U-100 INSULIN) glargine 100 units/mL (3mL) SubQ pen Inject 24 Units into the skin every evening. 9 mL 11    insulin lispro (HUMALOG KWIKPEN INSULIN) 100 unit/mL pen Inject 6 Units into the skin 3 (three) times daily. Plus sliding scale, MDD: 48 units 15 mL 4    lancets Misc To check BG 2 times daily, to use with insurance preferred meter 100 each 11    multivitamin capsule Take 1 capsule by mouth once daily.      mycophenolate (CELLCEPT) 250 mg Cap  "Take 4 capsules (1,000 mg total) by mouth 2 (two) times daily. 240 capsule 2    nystatin (MYCOSTATIN) 100,000 unit/mL suspension Take 5 mLs (500,000 Units total) by mouth 3 (three) times daily after meals. STOP 1/26/22 250 mL 0    omega-3 fatty acids/fish oil (FISH OIL-OMEGA-3 FATTY ACIDS) 300-1,000 mg capsule Take 1 capsule by mouth once daily.       oxyCODONE (ROXICODONE) 10 mg Tab immediate release tablet Take 1 tablet (10 mg total) by mouth every 4 (four) hours as needed for Pain. 30 tablet 0    pantoprazole (PROTONIX) 40 MG tablet Take 1 tablet (40 mg total) by mouth once daily. 30 tablet 5    pen needle, diabetic (BD ULTRA-FINE GÉNESIS PEN NEEDLE) 32 gauge x 5/32" Ndle Use with insulin once daily 100 each 3    predniSONE (DELTASONE) 5 MG tablet Take by mouth daily:  20mg 1/12-1/18, 15mg 1/19-1/25, 10mg 1/26-2/1, 5mg 2/2-2/8, STOP 2/9/22 75 tablet 0    sertraline (ZOLOFT) 25 MG tablet Take 1 tablet (25 mg total) by mouth once daily. 30 tablet 5    sulfamethoxazole-trimethoprim 400-80mg (BACTRIM,SEPTRA) 400-80 mg per tablet Take 1 tablet by mouth every morning. STOP 7/7/22 30 tablet 5    tacrolimus (PROGRAF) 1 MG Cap Take 2 capsules (2 mg total) by mouth every 12 (twelve) hours. 120 capsule 11    tamsulosin (FLOMAX) 0.4 mg Cap Take 1 capsule (0.4 mg total) by mouth every evening. 30 capsule 1    [START ON 1/16/2022] valGANciclovir (VALCYTE) 450 mg Tab Take 1 tablet (450 mg total) by mouth once daily. STOP 7/7/22 30 tablet 5     No current facility-administered medications for this visit.     Facility-Administered Medications Ordered in Other Visits   Medication Dose Route Frequency Provider Last Rate Last Admin    sodium chloride 0.9% bolus 1,000 mL  1,000 mL Intravenous Once Solange Bill NP           Pharmacy Interventions/Recommendations:     1) Graft Function & Immunosuppression Issues: tac/MMF/prednisone taper per protocol    2) Opportunistic Infection prophylaxis:   PCP ppx: bactrim x 6 " months  CMV ppx: Valcyte x 6 months.   Fungal ppx: nystatin    3) Donor Serologies & Monitoring:     Donor CMV Status: Positive  Donor HCV Status: Negative  Donor HBcAb: Negative  Donor HBV JAYLIN: Negative  Donor HCV JAYLIN: Negative      4) Pain Management & Bowel Regimen: Discharged with oxycodone 10 mg every 4 hours as needed    5) Blood Pressure Management: amlodipine started during admit, beta blocker held for bradycardia-- history of pA-fib    6) Blood Sugar Management & Follow-up: pre-transplant history of DM, discharged with lantus 24 units daily and novolog 6 units with meals plus moderate dose correction scale 180/25. Patient to follow BG logs in 1 week. Follow up in 1-2 weeks.    7) Electrolyte Management: n/a    8) Osteoporosis Prevention Strategy (Liver Transplant): pre-txp DEXA did not demonstrate osteopenia or osteoporosis, continue Calcium for prevention of steroid-induced bone disease    8) OTHER medication follow-up (patient assistance, held medications, etc): none    9) Reinforced medication education conducted in the hospital, including medication indications, dosing, administration, side effects, monitoring-- including timing of immunosuppressant levels.     Patient received their FIRST fill of medications from [facility].  Discussed the process for obtaining refills of medications, including verifying the dose and mailing address to have medications delivered.     Tej and his caregivers verbalized understanding and had the opportunity to ask questions.

## 2022-01-14 NOTE — TELEPHONE ENCOUNTER
Called to let patient know labs reviewed with Dr Zamarripa.  Tacrolimus level too high, will need to hold Tacrolimus and not take it until labs come back Saturday.

## 2022-01-14 NOTE — TELEPHONE ENCOUNTER
Returned call to clarify that patient is holding Prograf tonight and tomorrow until we call with labs results.    ----- Message from Trent Blevins sent at 1/14/2022  4:07 PM CST -----  Regarding: call back  Pt's wife returning Aniya call    Call

## 2022-01-14 NOTE — PROGRESS NOTES
1ST NURSING VISIT POST DISCHARGE NOTE    1st RN appointment with Tej Purdy post discharge 1/14/2022 s/p liver transplant 1/6/22.  Patient's patient and spouse accompanied him.  Upon assessment, patient complains of incisional pain.  Incision intact with staples.  Patient that he is able to explain daily incision care and showering instructions.  Medication list and rationale were reviewed.  Patient states  did bring blue medication card and medication bottles for review.  Self-assessment reviewed with patient and patient and spouse; time allowed for questions.  Patient expressed understanding of daily care including BID VS and medications.  Patient aware that nurse will review today's labs with a transplant physician and call with any does changes indicated.  Next labs and first post-operative transplant team appointment with labs scheduled for 1/18/2022.

## 2022-01-15 ENCOUNTER — LAB VISIT (OUTPATIENT)
Dept: LAB | Facility: HOSPITAL | Age: 67
End: 2022-01-15
Attending: SURGERY
Payer: MEDICARE

## 2022-01-15 DIAGNOSIS — E83.42 HYPOMAGNESEMIA: ICD-10-CM

## 2022-01-15 DIAGNOSIS — Z94.4 LIVER REPLACED BY TRANSPLANT: ICD-10-CM

## 2022-01-15 LAB
ALBUMIN SERPL BCP-MCNC: 3.3 G/DL (ref 3.5–5.2)
ALP SERPL-CCNC: 67 U/L (ref 55–135)
ALT SERPL W/O P-5'-P-CCNC: 93 U/L (ref 10–44)
ANION GAP SERPL CALC-SCNC: 9 MMOL/L (ref 8–16)
ANISOCYTOSIS BLD QL SMEAR: SLIGHT
AST SERPL-CCNC: 27 U/L (ref 10–40)
BASO STIPL BLD QL SMEAR: ABNORMAL
BASOPHILS NFR BLD: 1 % (ref 0–1.9)
BILIRUB DIRECT SERPL-MCNC: 0.4 MG/DL (ref 0.1–0.3)
BILIRUB SERPL-MCNC: 0.8 MG/DL (ref 0.1–1)
BUN SERPL-MCNC: 35 MG/DL (ref 8–23)
CALCIUM SERPL-MCNC: 9.1 MG/DL (ref 8.7–10.5)
CHLORIDE SERPL-SCNC: 98 MMOL/L (ref 95–110)
CO2 SERPL-SCNC: 28 MMOL/L (ref 23–29)
CREAT SERPL-MCNC: 1.1 MG/DL (ref 0.5–1.4)
DIFFERENTIAL METHOD: ABNORMAL
EOSINOPHIL NFR BLD: 4 % (ref 0–8)
ERYTHROCYTE [DISTWIDTH] IN BLOOD BY AUTOMATED COUNT: 14.2 % (ref 11.5–14.5)
EST. GFR  (AFRICAN AMERICAN): >60 ML/MIN/1.73 M^2
EST. GFR  (NON AFRICAN AMERICAN): >60 ML/MIN/1.73 M^2
GLUCOSE SERPL-MCNC: 101 MG/DL (ref 70–110)
HCT VFR BLD AUTO: 38.8 % (ref 40–54)
HGB BLD-MCNC: 12.6 G/DL (ref 14–18)
IMM GRANULOCYTES # BLD AUTO: ABNORMAL K/UL (ref 0–0.04)
IMM GRANULOCYTES NFR BLD AUTO: ABNORMAL % (ref 0–0.5)
LYMPHOCYTES NFR BLD: 15 % (ref 18–48)
MAGNESIUM SERPL-MCNC: 1.7 MG/DL (ref 1.6–2.6)
MCH RBC QN AUTO: 29.9 PG (ref 27–31)
MCHC RBC AUTO-ENTMCNC: 32.5 G/DL (ref 32–36)
MCV RBC AUTO: 92 FL (ref 82–98)
METAMYELOCYTES NFR BLD MANUAL: 2 %
MONOCYTES NFR BLD: 8 % (ref 4–15)
MYELOCYTES NFR BLD MANUAL: 1 %
NEUTROPHILS NFR BLD: 67 % (ref 38–73)
NEUTS BAND NFR BLD MANUAL: 2 %
NRBC BLD-RTO: 0 /100 WBC
PLATELET # BLD AUTO: 179 K/UL (ref 150–450)
PLATELET BLD QL SMEAR: ABNORMAL
PMV BLD AUTO: 11 FL (ref 9.2–12.9)
POTASSIUM SERPL-SCNC: 4.5 MMOL/L (ref 3.5–5.1)
PROT SERPL-MCNC: 5.6 G/DL (ref 6–8.4)
RBC # BLD AUTO: 4.22 M/UL (ref 4.6–6.2)
SODIUM SERPL-SCNC: 135 MMOL/L (ref 136–145)
TACROLIMUS BLD-MCNC: 8.7 NG/ML (ref 5–15)
WBC # BLD AUTO: 11.06 K/UL (ref 3.9–12.7)

## 2022-01-15 PROCEDURE — 80053 COMPREHEN METABOLIC PANEL: CPT | Performed by: SURGERY

## 2022-01-15 PROCEDURE — 85007 BL SMEAR W/DIFF WBC COUNT: CPT | Performed by: SURGERY

## 2022-01-15 PROCEDURE — 80197 ASSAY OF TACROLIMUS: CPT | Performed by: SURGERY

## 2022-01-15 PROCEDURE — 83735 ASSAY OF MAGNESIUM: CPT | Performed by: SURGERY

## 2022-01-15 PROCEDURE — 36415 COLL VENOUS BLD VENIPUNCTURE: CPT | Performed by: SURGERY

## 2022-01-15 PROCEDURE — 85027 COMPLETE CBC AUTOMATED: CPT | Performed by: SURGERY

## 2022-01-15 PROCEDURE — 82248 BILIRUBIN DIRECT: CPT | Performed by: SURGERY

## 2022-01-15 NOTE — TELEPHONE ENCOUNTER
Labs reviewed by Dr Zamarripa. Instructed pt to start prograf 1mg twice a day . Starting tonight. Repeat labs Tuesday. Pt understood

## 2022-01-16 RX ORDER — TACROLIMUS 1 MG/1
1 CAPSULE ORAL EVERY 12 HOURS
Qty: 60 CAPSULE | Refills: 11 | Status: SHIPPED | OUTPATIENT
Start: 2022-01-16 | End: 2022-01-18

## 2022-01-18 ENCOUNTER — LAB VISIT (OUTPATIENT)
Dept: LAB | Facility: HOSPITAL | Age: 67
End: 2022-01-18
Attending: SURGERY
Payer: MEDICARE

## 2022-01-18 ENCOUNTER — OFFICE VISIT (OUTPATIENT)
Dept: TRANSPLANT | Facility: CLINIC | Age: 67
End: 2022-01-18
Payer: MEDICARE

## 2022-01-18 VITALS
OXYGEN SATURATION: 93 % | HEART RATE: 99 BPM | TEMPERATURE: 98 F | WEIGHT: 231.25 LBS | HEIGHT: 72 IN | SYSTOLIC BLOOD PRESSURE: 162 MMHG | BODY MASS INDEX: 31.32 KG/M2 | RESPIRATION RATE: 18 BRPM | DIASTOLIC BLOOD PRESSURE: 71 MMHG

## 2022-01-18 DIAGNOSIS — Z51.81 ENCOUNTER FOR THERAPEUTIC DRUG MONITORING: ICD-10-CM

## 2022-01-18 DIAGNOSIS — E83.42 HYPOMAGNESEMIA: ICD-10-CM

## 2022-01-18 DIAGNOSIS — Z94.4 LIVER REPLACED BY TRANSPLANT: Primary | ICD-10-CM

## 2022-01-18 DIAGNOSIS — Z94.4 LIVER REPLACED BY TRANSPLANT: ICD-10-CM

## 2022-01-18 LAB
ALBUMIN SERPL BCP-MCNC: 3.3 G/DL (ref 3.5–5.2)
ALP SERPL-CCNC: 76 U/L (ref 55–135)
ALT SERPL W/O P-5'-P-CCNC: 52 U/L (ref 10–44)
ANION GAP SERPL CALC-SCNC: 9 MMOL/L (ref 8–16)
AST SERPL-CCNC: 20 U/L (ref 10–40)
BASOPHILS # BLD AUTO: 0.07 K/UL (ref 0–0.2)
BASOPHILS NFR BLD: 0.4 % (ref 0–1.9)
BILIRUB DIRECT SERPL-MCNC: 0.2 MG/DL (ref 0.1–0.3)
BILIRUB SERPL-MCNC: 0.5 MG/DL (ref 0.1–1)
BUN SERPL-MCNC: 29 MG/DL (ref 8–23)
CALCIUM SERPL-MCNC: 9 MG/DL (ref 8.7–10.5)
CHLORIDE SERPL-SCNC: 101 MMOL/L (ref 95–110)
CO2 SERPL-SCNC: 27 MMOL/L (ref 23–29)
COMMENT: NORMAL
CREAT SERPL-MCNC: 1 MG/DL (ref 0.5–1.4)
DIFFERENTIAL METHOD: ABNORMAL
EOSINOPHIL # BLD AUTO: 0.1 K/UL (ref 0–0.5)
EOSINOPHIL NFR BLD: 0.8 % (ref 0–8)
ERYTHROCYTE [DISTWIDTH] IN BLOOD BY AUTOMATED COUNT: 14.7 % (ref 11.5–14.5)
EST. GFR  (AFRICAN AMERICAN): >60 ML/MIN/1.73 M^2
EST. GFR  (NON AFRICAN AMERICAN): >60 ML/MIN/1.73 M^2
FINAL PATHOLOGIC DIAGNOSIS: NORMAL
GLUCOSE SERPL-MCNC: 179 MG/DL (ref 70–110)
GROSS: NORMAL
HCT VFR BLD AUTO: 34.5 % (ref 40–54)
HGB BLD-MCNC: 11.2 G/DL (ref 14–18)
IMM GRANULOCYTES # BLD AUTO: 0.36 K/UL (ref 0–0.04)
IMM GRANULOCYTES NFR BLD AUTO: 2.3 % (ref 0–0.5)
LYMPHOCYTES # BLD AUTO: 1.3 K/UL (ref 1–4.8)
LYMPHOCYTES NFR BLD: 8.1 % (ref 18–48)
Lab: NORMAL
MAGNESIUM SERPL-MCNC: 1.4 MG/DL (ref 1.6–2.6)
MCH RBC QN AUTO: 30.4 PG (ref 27–31)
MCHC RBC AUTO-ENTMCNC: 32.5 G/DL (ref 32–36)
MCV RBC AUTO: 94 FL (ref 82–98)
MONOCYTES # BLD AUTO: 1 K/UL (ref 0.3–1)
MONOCYTES NFR BLD: 6.1 % (ref 4–15)
NEUTROPHILS # BLD AUTO: 12.9 K/UL (ref 1.8–7.7)
NEUTROPHILS NFR BLD: 82.3 % (ref 38–73)
NRBC BLD-RTO: 0 /100 WBC
PLATELET # BLD AUTO: 162 K/UL (ref 150–450)
PMV BLD AUTO: 10.4 FL (ref 9.2–12.9)
POTASSIUM SERPL-SCNC: 4.9 MMOL/L (ref 3.5–5.1)
PROT SERPL-MCNC: 5.7 G/DL (ref 6–8.4)
RBC # BLD AUTO: 3.69 M/UL (ref 4.6–6.2)
SODIUM SERPL-SCNC: 137 MMOL/L (ref 136–145)
TACROLIMUS BLD-MCNC: 5.1 NG/ML (ref 5–15)
WBC # BLD AUTO: 15.69 K/UL (ref 3.9–12.7)

## 2022-01-18 PROCEDURE — 99999 PR PBB SHADOW E&M-EST. PATIENT-LVL V: ICD-10-PCS | Mod: PBBFAC,,, | Performed by: SURGERY

## 2022-01-18 PROCEDURE — 83735 ASSAY OF MAGNESIUM: CPT | Performed by: SURGERY

## 2022-01-18 PROCEDURE — 99214 PR OFFICE/OUTPT VISIT, EST, LEVL IV, 30-39 MIN: ICD-10-PCS | Mod: 24,S$GLB,, | Performed by: SURGERY

## 2022-01-18 PROCEDURE — 3044F HG A1C LEVEL LT 7.0%: CPT | Mod: CPTII,S$GLB,, | Performed by: SURGERY

## 2022-01-18 PROCEDURE — 3077F PR MOST RECENT SYSTOLIC BLOOD PRESSURE >= 140 MM HG: ICD-10-PCS | Mod: CPTII,S$GLB,, | Performed by: SURGERY

## 2022-01-18 PROCEDURE — 1159F MED LIST DOCD IN RCRD: CPT | Mod: CPTII,S$GLB,, | Performed by: SURGERY

## 2022-01-18 PROCEDURE — 3008F BODY MASS INDEX DOCD: CPT | Mod: CPTII,S$GLB,, | Performed by: SURGERY

## 2022-01-18 PROCEDURE — 99214 OFFICE O/P EST MOD 30 MIN: CPT | Mod: 24,S$GLB,, | Performed by: SURGERY

## 2022-01-18 PROCEDURE — 1101F PT FALLS ASSESS-DOCD LE1/YR: CPT | Mod: CPTII,S$GLB,, | Performed by: SURGERY

## 2022-01-18 PROCEDURE — 36415 COLL VENOUS BLD VENIPUNCTURE: CPT | Performed by: SURGERY

## 2022-01-18 PROCEDURE — 82248 BILIRUBIN DIRECT: CPT | Performed by: SURGERY

## 2022-01-18 PROCEDURE — 3044F PR MOST RECENT HEMOGLOBIN A1C LEVEL <7.0%: ICD-10-PCS | Mod: CPTII,S$GLB,, | Performed by: SURGERY

## 2022-01-18 PROCEDURE — 3288F PR FALLS RISK ASSESSMENT DOCUMENTED: ICD-10-PCS | Mod: CPTII,S$GLB,, | Performed by: SURGERY

## 2022-01-18 PROCEDURE — 3078F DIAST BP <80 MM HG: CPT | Mod: CPTII,S$GLB,, | Performed by: SURGERY

## 2022-01-18 PROCEDURE — 1101F PR PT FALLS ASSESS DOC 0-1 FALLS W/OUT INJ PAST YR: ICD-10-PCS | Mod: CPTII,S$GLB,, | Performed by: SURGERY

## 2022-01-18 PROCEDURE — 3078F PR MOST RECENT DIASTOLIC BLOOD PRESSURE < 80 MM HG: ICD-10-PCS | Mod: CPTII,S$GLB,, | Performed by: SURGERY

## 2022-01-18 PROCEDURE — 99999 PR PBB SHADOW E&M-EST. PATIENT-LVL V: CPT | Mod: PBBFAC,,, | Performed by: SURGERY

## 2022-01-18 PROCEDURE — 3077F SYST BP >= 140 MM HG: CPT | Mod: CPTII,S$GLB,, | Performed by: SURGERY

## 2022-01-18 PROCEDURE — 1111F PR DISCHARGE MEDS RECONCILED W/ CURRENT OUTPATIENT MED LIST: ICD-10-PCS | Mod: CPTII,S$GLB,, | Performed by: SURGERY

## 2022-01-18 PROCEDURE — 1125F AMNT PAIN NOTED PAIN PRSNT: CPT | Mod: CPTII,S$GLB,, | Performed by: SURGERY

## 2022-01-18 PROCEDURE — 1159F PR MEDICATION LIST DOCUMENTED IN MEDICAL RECORD: ICD-10-PCS | Mod: CPTII,S$GLB,, | Performed by: SURGERY

## 2022-01-18 PROCEDURE — 1125F PR PAIN SEVERITY QUANTIFIED, PAIN PRESENT: ICD-10-PCS | Mod: CPTII,S$GLB,, | Performed by: SURGERY

## 2022-01-18 PROCEDURE — 80053 COMPREHEN METABOLIC PANEL: CPT | Performed by: SURGERY

## 2022-01-18 PROCEDURE — 85025 COMPLETE CBC W/AUTO DIFF WBC: CPT | Performed by: SURGERY

## 2022-01-18 PROCEDURE — 80197 ASSAY OF TACROLIMUS: CPT | Performed by: SURGERY

## 2022-01-18 PROCEDURE — 3008F PR BODY MASS INDEX (BMI) DOCUMENTED: ICD-10-PCS | Mod: CPTII,S$GLB,, | Performed by: SURGERY

## 2022-01-18 PROCEDURE — 3288F FALL RISK ASSESSMENT DOCD: CPT | Mod: CPTII,S$GLB,, | Performed by: SURGERY

## 2022-01-18 PROCEDURE — 1111F DSCHRG MED/CURRENT MED MERGE: CPT | Mod: CPTII,S$GLB,, | Performed by: SURGERY

## 2022-01-18 RX ORDER — TACROLIMUS 1 MG/1
2 CAPSULE ORAL EVERY 12 HOURS
Qty: 120 CAPSULE | Refills: 11 | Status: SHIPPED | OUTPATIENT
Start: 2022-01-18 | End: 2022-02-14

## 2022-01-18 RX ORDER — AMOXICILLIN 500 MG/1
500 CAPSULE ORAL EVERY 8 HOURS
Qty: 21 CAPSULE | Refills: 0 | Status: SHIPPED | OUTPATIENT
Start: 2022-01-18 | End: 2022-01-21

## 2022-01-18 NOTE — PROGRESS NOTES
Admitted 1/5-1/13 and is now s/p liver transplant. Warfarin was d/c so we will d/c patient from our care.

## 2022-01-18 NOTE — LETTER
January 18, 2022        Jessika Carbone  [address]  [facility] [address]  Phone: [phone]  Fax: [phone]             [facility]  [address]  [facility] [address]  Phone: [phone]   Patient: Tej Purdy   MR Number: [id number]   YOB: 1955   Date of Visit: 1/18/2022       Dear Dr. Jessika Carbone    Thank you for referring Tej Purdy to me for evaluation. Attached you will find relevant portions of my assessment and plan of care.    If you have questions, please do not hesitate to call me. I look forward to following Tej Purdy along with you.    Sincerely,    Sagar Gauthier MD    Enclosure    If you would like to receive this communication electronically, please contact [email or web address] or [phone] to request [employer] access.    [employer] is a tool which provides read-only access to select patient information with whom you have a relationship. Its easy to use and provides real time access to review your patients record including encounter summaries, notes, results, and demographic information.    If you feel you have received this communication in error or would no longer like to receive these types of communications, please e-mail [email or web address]

## 2022-01-18 NOTE — PROGRESS NOTES
Transplant Surgery  Liver Transplant Recipient Follow-up    Original Referring Physician: Jessika Carbone  Current Corresponding Physician: Jessika Carbone    Chief Complaint: Tej is here for follow up of his liver transplant performed 1/6/2022 for the primary diagnosis (UNOS) of Primary Liver Malignancy: Hepatoma (HCC) and Cirrhosis    ORGAN: LIVER  Whole or Partial: whole liver  Donor Type: donation after brain death  PHS Increased Risk: no  Donor CMV Status: Positive  Donor HCV Status: Negative  Donor HBcAb: Negative  Donor HBV JAYLIN: Negative  Donor HCV JAYLIN: Negative    Biliary Anastomosis: end to end  Arterial Anatomy: replaced left hepatic from left gastric  IVC reconstruction: end to end ivc  Portal vein status: patent    Subjective:     History of Present Illness: He has had the following complications since transplant: iv wound infection area.  The noted complications needs to be addressed more thoroughly today.    Interval History: Currently, he is doing well.  Current complaints include none.  Tej is here for management of his immunosuppression medication.    External provider notes reviewed: No    Review of Systems   Constitutional: Negative for fatigue.   HENT: Negative for drooling, postnasal drip and sore throat.    Eyes: Negative for discharge and itching.   Respiratory: Negative for choking and stridor.    Gastrointestinal: Negative for rectal pain.   Endocrine: Negative for polydipsia.   Genitourinary: Negative for enuresis and genital sores.   Musculoskeletal: Negative for back pain, neck pain and neck stiffness.   Allergic/Immunologic: Positive for immunocompromised state.   Neurological: Negative for facial asymmetry and numbness.   Hematological: Negative for adenopathy.   Psychiatric/Behavioral: Negative for behavioral problems, self-injury and suicidal ideas.     Objective:     Physical Exam  Abdominal:          Comments: Wound healing well    Has a slight area of infection at left upper  extremity at antecubital area       Lab Results   Component Value Date    BILITOT 0.5 01/18/2022    AST 20 01/18/2022    ALT 52 (H) 01/18/2022    ALKPHOS 76 01/18/2022    CREATININE 1.0 01/18/2022    ALBUMIN 3.3 (L) 01/18/2022     Lab Results   Component Value Date    WBC 15.69 (H) 01/18/2022    HGB 11.2 (L) 01/18/2022    HCT 34.5 (L) 01/18/2022    HCT 33 (L) 01/06/2022     01/18/2022     Lab Results   Component Value Date    TACROLIMUS 5.1 01/18/2022       Diagnostics:  The following labs have been reviewed: CBC  CMP      Assessment/Plan:          · S/P liver transplant.  · Chronic immunosuppressive medications for rejection prophylaxis at target.  Plan: no adjustment needed.  · Continue monitoring symptoms, labs and drug levels for drug-related toxicity and side effects.  · Incision: staples in place; wound clean, dry, and intact  · Femoral arterial line site: no complications evident    Additional testing to be completed according to Written Order Guidelines for Post-Liver and Combined Liver/Kidney Transplant Follow-up (LI-09)    Interpretation of tests and discussion of patient management involves all members of the multidisciplinary transplant team  Patient advised that it is recommended that all transplanted patients, and their close contacts and household members receive Covid vaccination.  Sagar Gauthier MD       [facility] Patient Status  Functional Status: 80% - Normal activity with effort: some symptoms of disease  Physical Capacity: No Limitations

## 2022-01-20 ENCOUNTER — PATIENT MESSAGE (OUTPATIENT)
Dept: ADMINISTRATIVE | Facility: OTHER | Age: 67
End: 2022-01-20
Payer: MEDICARE

## 2022-01-20 ENCOUNTER — TELEPHONE (OUTPATIENT)
Dept: TRANSPLANT | Facility: CLINIC | Age: 67
End: 2022-01-20
Payer: MEDICARE

## 2022-01-20 ENCOUNTER — CONFERENCE (OUTPATIENT)
Dept: TRANSPLANT | Facility: CLINIC | Age: 67
End: 2022-01-20
Payer: MEDICARE

## 2022-01-20 ENCOUNTER — PATIENT MESSAGE (OUTPATIENT)
Dept: TRANSPLANT | Facility: CLINIC | Age: 67
End: 2022-01-20
Payer: MEDICARE

## 2022-01-20 DIAGNOSIS — C22.1 CHOLANGIOCARCINOMA: Primary | ICD-10-CM

## 2022-01-20 NOTE — TELEPHONE ENCOUNTER
Spoke to Dr Zamarripa about patients abdominal pain, Eugenio DALY also went over his meds.  He will be seen in clinic tomorrow, so he will look at it then.  Hold Amoxaillin for now.  Called patient to let him know sent message to patient, they did not answer.

## 2022-01-20 NOTE — TELEPHONE ENCOUNTER
Patient: Tej Purdy       MRN: [id number]      : 1955     Age: 66 y.o.  [address]   [place] [address]    Providers  Hepatologists: Thais Maxwell MD  Surgeons: Lizet Garcia MD  Radiologists: Pierre Dobbs MD  Advanced Practice providers:     Priority of review: Cancer- explant shows cholangiocarcinoma and not HCC; review pre-transplant films -we treated him like he had HCC    Patient Transplant Status: Other post-transplant    Reason for presentation: Reassessment of films pre transplant that led to diagnosis of HCC    Clinical Summary: post transplant; pre transplant had TACE 20  RFA 20    Imaging to be reviewed: pre-transplant films  HCC Treatment History: as above    ABO: O NEG    Platelets:   Lab Results   Component Value Date/Time     2022 10:11 AM     Creatinine:   Lab Results   Component Value Date/Time    CREATININE 1.0 2022 10:11 AM     Bilirubin:   Lab Results   Component Value Date/Time    BILITOT 0.5 2022 10:11 AM     AFP Last 3 each if available:   Lab Results   Component Value Date/Time    AFP 3.1 11/10/2021 11:45 AM    AFP 3.1 2021 11:50 AM    AFP 2.6 2021 01:10 PM       MELD: MELD-Na score: 9 at 1/15/2022  8:09 AM  MELD score: 9 at 1/15/2022  8:09 AM  Calculated from:  Serum Creatinine: 1.1 mg/dL at 1/15/2022  8:09 AM  Serum Sodium: 135 mmol/L at 1/15/2022  8:09 AM  Total Bilirubin: 0.8 mg/dL (Using min of 1 mg/dL) at 1/15/2022  8:09 AM  INR(ratio): 1.2 at 2022  6:35 AM  Age: 66 years    Plan:     Follow-up Provider:

## 2022-01-20 NOTE — TELEPHONE ENCOUNTER
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

## 2022-01-21 ENCOUNTER — HOSPITAL ENCOUNTER (OUTPATIENT)
Dept: CARDIOLOGY | Facility: CLINIC | Age: 67
Discharge: HOME | End: 2022-01-21
Payer: MEDICARE

## 2022-01-21 ENCOUNTER — TELEPHONE (OUTPATIENT)
Dept: TRANSPLANT | Facility: CLINIC | Age: 67
End: 2022-01-21

## 2022-01-21 ENCOUNTER — OFFICE VISIT (OUTPATIENT)
Dept: TRANSPLANT | Facility: CLINIC | Age: 67
End: 2022-01-21
Payer: MEDICARE

## 2022-01-21 ENCOUNTER — TELEPHONE (OUTPATIENT)
Dept: HEMATOLOGY/ONCOLOGY | Facility: CLINIC | Age: 67
End: 2022-01-21
Payer: MEDICARE

## 2022-01-21 ENCOUNTER — OFFICE VISIT (OUTPATIENT)
Dept: ELECTROPHYSIOLOGY | Facility: CLINIC | Age: 67
End: 2022-01-21
Payer: MEDICARE

## 2022-01-21 VITALS
BODY MASS INDEX: 30.81 KG/M2 | HEIGHT: 72 IN | OXYGEN SATURATION: 95 % | DIASTOLIC BLOOD PRESSURE: 73 MMHG | SYSTOLIC BLOOD PRESSURE: 127 MMHG | TEMPERATURE: 97 F | HEART RATE: 102 BPM | RESPIRATION RATE: 17 BRPM | WEIGHT: 227.5 LBS

## 2022-01-21 VITALS
BODY MASS INDEX: 30.75 KG/M2 | HEART RATE: 92 BPM | WEIGHT: 227.06 LBS | DIASTOLIC BLOOD PRESSURE: 73 MMHG | SYSTOLIC BLOOD PRESSURE: 127 MMHG | HEIGHT: 72 IN

## 2022-01-21 DIAGNOSIS — I48.11 LONGSTANDING PERSISTENT ATRIAL FIBRILLATION: ICD-10-CM

## 2022-01-21 DIAGNOSIS — C22.1 CHOLANGIOCARCINOMA: Primary | ICD-10-CM

## 2022-01-21 DIAGNOSIS — I25.10 CORONARY ARTERY DISEASE INVOLVING NATIVE CORONARY ARTERY OF NATIVE HEART WITHOUT ANGINA PECTORIS: ICD-10-CM

## 2022-01-21 DIAGNOSIS — Z51.81 ENCOUNTER FOR THERAPEUTIC DRUG MONITORING: Primary | ICD-10-CM

## 2022-01-21 DIAGNOSIS — Z94.4 S/P LIVER TRANSPLANT: ICD-10-CM

## 2022-01-21 DIAGNOSIS — I48.19 PERSISTENT ATRIAL FIBRILLATION: ICD-10-CM

## 2022-01-21 DIAGNOSIS — Z92.89 HISTORY OF CARDIOVERSION: ICD-10-CM

## 2022-01-21 DIAGNOSIS — Z94.4 LIVER REPLACED BY TRANSPLANT: ICD-10-CM

## 2022-01-21 DIAGNOSIS — I48.0 PAROXYSMAL A-FIB: Primary | ICD-10-CM

## 2022-01-21 DIAGNOSIS — E78.00 PURE HYPERCHOLESTEROLEMIA: ICD-10-CM

## 2022-01-21 PROCEDURE — 3078F DIAST BP <80 MM HG: CPT | Mod: CPTII,S$GLB,, | Performed by: SURGERY

## 2022-01-21 PROCEDURE — 1125F PR PAIN SEVERITY QUANTIFIED, PAIN PRESENT: ICD-10-PCS | Mod: CPTII,S$GLB,, | Performed by: SURGERY

## 2022-01-21 PROCEDURE — 1160F PR REVIEW ALL MEDS BY PRESCRIBER/CLIN PHARMACIST DOCUMENTED: ICD-10-PCS | Mod: CPTII,S$GLB,, | Performed by: SURGERY

## 2022-01-21 PROCEDURE — 4010F PR ACE/ARB THEARPY RXD/TAKEN: ICD-10-PCS | Mod: CPTII,S$GLB,, | Performed by: INTERNAL MEDICINE

## 2022-01-21 PROCEDURE — 3008F PR BODY MASS INDEX (BMI) DOCUMENTED: ICD-10-PCS | Mod: CPTII,S$GLB,, | Performed by: SURGERY

## 2022-01-21 PROCEDURE — 3288F PR FALLS RISK ASSESSMENT DOCUMENTED: ICD-10-PCS | Mod: CPTII,S$GLB,, | Performed by: INTERNAL MEDICINE

## 2022-01-21 PROCEDURE — 3288F FALL RISK ASSESSMENT DOCD: CPT | Mod: CPTII,S$GLB,, | Performed by: INTERNAL MEDICINE

## 2022-01-21 PROCEDURE — 99999 PR PBB SHADOW E&M-EST. PATIENT-LVL V: ICD-10-PCS | Mod: PBBFAC,,, | Performed by: INTERNAL MEDICINE

## 2022-01-21 PROCEDURE — 3074F SYST BP LT 130 MM HG: CPT | Mod: CPTII,S$GLB,, | Performed by: SURGERY

## 2022-01-21 PROCEDURE — 4010F ACE/ARB THERAPY RXD/TAKEN: CPT | Mod: CPTII,S$GLB,, | Performed by: SURGERY

## 2022-01-21 PROCEDURE — 99214 PR OFFICE/OUTPT VISIT, EST, LEVL IV, 30-39 MIN: ICD-10-PCS | Mod: S$GLB,,, | Performed by: INTERNAL MEDICINE

## 2022-01-21 PROCEDURE — 1160F RVW MEDS BY RX/DR IN RCRD: CPT | Mod: CPTII,S$GLB,, | Performed by: INTERNAL MEDICINE

## 2022-01-21 PROCEDURE — 3074F SYST BP LT 130 MM HG: CPT | Mod: CPTII,S$GLB,, | Performed by: INTERNAL MEDICINE

## 2022-01-21 PROCEDURE — 99999 PR PBB SHADOW E&M-EST. PATIENT-LVL V: CPT | Mod: PBBFAC,,,

## 2022-01-21 PROCEDURE — 93010 RHYTHM STRIP: ICD-10-PCS | Mod: S$GLB,,, | Performed by: INTERNAL MEDICINE

## 2022-01-21 PROCEDURE — 1111F DSCHRG MED/CURRENT MED MERGE: CPT | Mod: CPTII,S$GLB,, | Performed by: INTERNAL MEDICINE

## 2022-01-21 PROCEDURE — 3008F PR BODY MASS INDEX (BMI) DOCUMENTED: ICD-10-PCS | Mod: CPTII,S$GLB,, | Performed by: INTERNAL MEDICINE

## 2022-01-21 PROCEDURE — 1111F PR DISCHARGE MEDS RECONCILED W/ CURRENT OUTPATIENT MED LIST: ICD-10-PCS | Mod: CPTII,S$GLB,, | Performed by: INTERNAL MEDICINE

## 2022-01-21 PROCEDURE — 1101F PR PT FALLS ASSESS DOC 0-1 FALLS W/OUT INJ PAST YR: ICD-10-PCS | Mod: CPTII,S$GLB,, | Performed by: SURGERY

## 2022-01-21 PROCEDURE — 3044F HG A1C LEVEL LT 7.0%: CPT | Mod: CPTII,S$GLB,, | Performed by: INTERNAL MEDICINE

## 2022-01-21 PROCEDURE — 3288F FALL RISK ASSESSMENT DOCD: CPT | Mod: CPTII,S$GLB,, | Performed by: SURGERY

## 2022-01-21 PROCEDURE — 3074F PR MOST RECENT SYSTOLIC BLOOD PRESSURE < 130 MM HG: ICD-10-PCS | Mod: CPTII,S$GLB,, | Performed by: SURGERY

## 2022-01-21 PROCEDURE — 99499 NO LOS: ICD-10-PCS | Mod: S$GLB,,, | Performed by: SURGERY

## 2022-01-21 PROCEDURE — 1126F PR PAIN SEVERITY QUANTIFIED, NO PAIN PRESENT: ICD-10-PCS | Mod: CPTII,S$GLB,, | Performed by: INTERNAL MEDICINE

## 2022-01-21 PROCEDURE — 3044F PR MOST RECENT HEMOGLOBIN A1C LEVEL <7.0%: ICD-10-PCS | Mod: CPTII,S$GLB,, | Performed by: SURGERY

## 2022-01-21 PROCEDURE — 3078F DIAST BP <80 MM HG: CPT | Mod: CPTII,S$GLB,, | Performed by: INTERNAL MEDICINE

## 2022-01-21 PROCEDURE — 1159F PR MEDICATION LIST DOCUMENTED IN MEDICAL RECORD: ICD-10-PCS | Mod: CPTII,S$GLB,, | Performed by: INTERNAL MEDICINE

## 2022-01-21 PROCEDURE — 1160F PR REVIEW ALL MEDS BY PRESCRIBER/CLIN PHARMACIST DOCUMENTED: ICD-10-PCS | Mod: CPTII,S$GLB,, | Performed by: INTERNAL MEDICINE

## 2022-01-21 PROCEDURE — 1101F PR PT FALLS ASSESS DOC 0-1 FALLS W/OUT INJ PAST YR: ICD-10-PCS | Mod: CPTII,S$GLB,, | Performed by: INTERNAL MEDICINE

## 2022-01-21 PROCEDURE — 1126F AMNT PAIN NOTED NONE PRSNT: CPT | Mod: CPTII,S$GLB,, | Performed by: INTERNAL MEDICINE

## 2022-01-21 PROCEDURE — 1101F PT FALLS ASSESS-DOCD LE1/YR: CPT | Mod: CPTII,S$GLB,, | Performed by: INTERNAL MEDICINE

## 2022-01-21 PROCEDURE — 3044F HG A1C LEVEL LT 7.0%: CPT | Mod: CPTII,S$GLB,, | Performed by: SURGERY

## 2022-01-21 PROCEDURE — 93010 ELECTROCARDIOGRAM REPORT: CPT | Mod: S$GLB,,, | Performed by: INTERNAL MEDICINE

## 2022-01-21 PROCEDURE — 99999 PR PBB SHADOW E&M-EST. PATIENT-LVL V: ICD-10-PCS | Mod: PBBFAC,,,

## 2022-01-21 PROCEDURE — 99999 PR PBB SHADOW E&M-EST. PATIENT-LVL V: CPT | Mod: PBBFAC,,, | Performed by: INTERNAL MEDICINE

## 2022-01-21 PROCEDURE — 3078F PR MOST RECENT DIASTOLIC BLOOD PRESSURE < 80 MM HG: ICD-10-PCS | Mod: CPTII,S$GLB,, | Performed by: SURGERY

## 2022-01-21 PROCEDURE — 3008F BODY MASS INDEX DOCD: CPT | Mod: CPTII,S$GLB,, | Performed by: SURGERY

## 2022-01-21 PROCEDURE — 1159F MED LIST DOCD IN RCRD: CPT | Mod: CPTII,S$GLB,, | Performed by: INTERNAL MEDICINE

## 2022-01-21 PROCEDURE — 4010F PR ACE/ARB THEARPY RXD/TAKEN: ICD-10-PCS | Mod: CPTII,S$GLB,, | Performed by: SURGERY

## 2022-01-21 PROCEDURE — 1159F MED LIST DOCD IN RCRD: CPT | Mod: CPTII,S$GLB,, | Performed by: SURGERY

## 2022-01-21 PROCEDURE — 4010F ACE/ARB THERAPY RXD/TAKEN: CPT | Mod: CPTII,S$GLB,, | Performed by: INTERNAL MEDICINE

## 2022-01-21 PROCEDURE — 3074F PR MOST RECENT SYSTOLIC BLOOD PRESSURE < 130 MM HG: ICD-10-PCS | Mod: CPTII,S$GLB,, | Performed by: INTERNAL MEDICINE

## 2022-01-21 PROCEDURE — 1159F PR MEDICATION LIST DOCUMENTED IN MEDICAL RECORD: ICD-10-PCS | Mod: CPTII,S$GLB,, | Performed by: SURGERY

## 2022-01-21 PROCEDURE — 3008F BODY MASS INDEX DOCD: CPT | Mod: CPTII,S$GLB,, | Performed by: INTERNAL MEDICINE

## 2022-01-21 PROCEDURE — 99499 UNLISTED E&M SERVICE: CPT | Mod: S$GLB,,, | Performed by: SURGERY

## 2022-01-21 PROCEDURE — 1101F PT FALLS ASSESS-DOCD LE1/YR: CPT | Mod: CPTII,S$GLB,, | Performed by: SURGERY

## 2022-01-21 PROCEDURE — 93005 ELECTROCARDIOGRAM TRACING: CPT | Mod: S$GLB,,, | Performed by: INTERNAL MEDICINE

## 2022-01-21 PROCEDURE — 99214 OFFICE O/P EST MOD 30 MIN: CPT | Mod: S$GLB,,, | Performed by: INTERNAL MEDICINE

## 2022-01-21 PROCEDURE — 1125F AMNT PAIN NOTED PAIN PRSNT: CPT | Mod: CPTII,S$GLB,, | Performed by: SURGERY

## 2022-01-21 PROCEDURE — 3044F PR MOST RECENT HEMOGLOBIN A1C LEVEL <7.0%: ICD-10-PCS | Mod: CPTII,S$GLB,, | Performed by: INTERNAL MEDICINE

## 2022-01-21 PROCEDURE — 93005 RHYTHM STRIP: ICD-10-PCS | Mod: S$GLB,,, | Performed by: INTERNAL MEDICINE

## 2022-01-21 PROCEDURE — 3288F PR FALLS RISK ASSESSMENT DOCUMENTED: ICD-10-PCS | Mod: CPTII,S$GLB,, | Performed by: SURGERY

## 2022-01-21 PROCEDURE — 1160F RVW MEDS BY RX/DR IN RCRD: CPT | Mod: CPTII,S$GLB,, | Performed by: SURGERY

## 2022-01-21 PROCEDURE — 3078F PR MOST RECENT DIASTOLIC BLOOD PRESSURE < 80 MM HG: ICD-10-PCS | Mod: CPTII,S$GLB,, | Performed by: INTERNAL MEDICINE

## 2022-01-21 RX ORDER — LOVASTATIN 20 MG/1
20 TABLET ORAL DAILY
COMMUNITY
Start: 2022-01-20 | End: 2023-12-11

## 2022-01-21 RX ORDER — LISINOPRIL 20 MG/1
TABLET ORAL
COMMUNITY
Start: 2022-01-20 | End: 2022-01-21

## 2022-01-21 NOTE — TELEPHONE ENCOUNTER
Reviewed labs with Dr Zamarripa, stable no changes.  Called patient to let him know to repeat labs on Monday.

## 2022-01-21 NOTE — PROGRESS NOTES
Subjective:   Patient ID:  Tej Purdy is a 66 y.o. male who presents for follow-up of atrial fibrillation s/p DCCV    Mr. Purdy is a 66 y.o. male with history of HTN, HLD, DM2 , PAD, HCC s/p TACE/RFA (12/2020), and persistent AF.   OLT 1/6/2021    On 5/7/21, Mr. Purdy presented for RHC and pre-procedure ECG showed AF with RVR. He was relatively asymptomatic, noting some mild dizziness. He was subsequently started on metoprolol 25 mg ER daily and apixaban 5 mg bid. He was referred to EP.      At initial EP visit, ECG showed AF with RVR. Metoprolol was increased to 50 mg daily and ASHISH/DCCV recommended. On 5/19/21, Mr. Neumann underwent successful cardioversion and was started on flecainide 100 mg bid at discharge. Several days later on 5/24/21 he presented to the ED with SOB and HTN, resulting in flash pulmonary edema. He was started on lasix. He returned to [facility] on 5/31/21 for ASHISH/DCCV. He had restoration of sinus rhythm and flecainide was increased to 150 mg bid. Approximately one week later, Mr. Neumann was seen in cardiology clinic where he was found to be hypotensive with NEVIN due to over diuresis.  He was sent to the ED where ECG showed AF. Flecainide and lasix were stopped.    6/16/2021 he did not notice any significant symptom improvement following DCCV and has continued to feel lightheaded and weak. Returned on 7/7/21 for ASHISH/DCCV. He had restoration of sinus rhythm and was then started on amiodarone with oral load. ECG at one week follow up showed recurrence of AF.   7/27/21 Mr. Purdy returned for cardioversion and had restoration of sinus rhythm.   He was continued on amiodarone and eliquis.      He underwent OLT on 1/6/21. Of note, path shows cholangiocarcinoma, not HCC as previously diagnosed. He's had ups and downs since then, but nothing c/w AF.    My interpretation of today's ECG is NSR      Current Outpatient Medications   Medication Sig    amiodarone (PACERONE) 200 MG Tab Take 1 tablet (200 mg  "total) by mouth once daily.    amLODIPine (NORVASC) 10 MG tablet Take 1 tablet (10 mg total) by mouth once daily.    apixaban (ELIQUIS) 5 mg Tab Take 1 tablet (5 mg total) by mouth 2 (two) times daily.    aspirin (ECOTRIN) 81 MG EC tablet Take 81 mg by mouth once daily.    blood sugar diagnostic Strp To check BG 2 times daily, to use with insurance preferred meter    blood-glucose meter kit To check BG 2 times daily, to use with insurance preferred meter    calcium carbonate (TUMS) 200 mg calcium (500 mg) chewable tablet Take 1 tablet (500 mg total) by mouth 3 (three) times daily as needed for Heartburn.    calcium carbonate-vitamin D3 600 mg-20 mcg (800 unit) Tab Take 1 tablet by mouth once daily.    gabapentin (NEURONTIN) 300 MG capsule Take 1 capsule (300 mg total) by mouth 3 (three) times daily as needed (neuropathy).    insulin (LANTUS SOLOSTAR U-100 INSULIN) glargine 100 units/mL (3mL) SubQ pen Inject 24 Units into the skin every evening.    insulin lispro (HUMALOG KWIKPEN INSULIN) 100 unit/mL pen Inject 6 Units into the skin 3 (three) times daily. Plus sliding scale, MDD: 48 units    lancets Misc To check BG 2 times daily, to use with insurance preferred meter    multivitamin capsule Take 1 capsule by mouth once daily.    mycophenolate (CELLCEPT) 250 mg Cap Take 4 capsules (1,000 mg total) by mouth 2 (two) times daily.    nystatin (MYCOSTATIN) 100,000 unit/mL suspension Take 5 mLs (500,000 Units total) by mouth 3 (three) times daily after meals. STOP 1/26/22    omega-3 fatty acids/fish oil (FISH OIL-OMEGA-3 FATTY ACIDS) 300-1,000 mg capsule Take 1 capsule by mouth once daily.     oxyCODONE (ROXICODONE) 10 mg Tab immediate release tablet Take 1 tablet (10 mg total) by mouth every 4 (four) hours as needed for Pain.    pantoprazole (PROTONIX) 40 MG tablet Take 1 tablet (40 mg total) by mouth once daily.    pen needle, diabetic (BD ULTRA-FINE GÉNESIS PEN NEEDLE) 32 gauge x 5/32" Ndle Use with " insulin once daily    predniSONE (DELTASONE) 5 MG tablet Take by mouth daily:  20mg 1/12-1/18, 15mg 1/19-1/25, 10mg 1/26-2/1, 5mg 2/2-2/8, STOP 2/9/22    sertraline (ZOLOFT) 25 MG tablet Take 1 tablet (25 mg total) by mouth once daily.    sulfamethoxazole-trimethoprim 400-80mg (BACTRIM,SEPTRA) 400-80 mg per tablet Take 1 tablet by mouth every morning. STOP 7/7/22    tacrolimus (PROGRAF) 1 MG Cap Take 2 capsules (2 mg total) by mouth every 12 (twelve) hours.    tamsulosin (FLOMAX) 0.4 mg Cap Take 1 capsule (0.4 mg total) by mouth every evening.    valGANciclovir (VALCYTE) 450 mg Tab Take 1 tablet (450 mg total) by mouth once daily. STOP 7/7/22    lovastatin (MEVACOR) 20 MG tablet      No current facility-administered medications for this visit.     Facility-Administered Medications Ordered in Other Visits   Medication    sodium chloride 0.9% bolus 1,000 mL       Review of Systems   Constitutional: Positive for malaise/fatigue.   HENT: Negative.  Negative for ear pain and tinnitus.    Eyes: Negative for blurred vision.   Cardiovascular: Negative.  Negative for chest pain, dyspnea on exertion, irregular heartbeat, leg swelling, near-syncope, palpitations and syncope.   Respiratory: Negative.  Negative for shortness of breath.    Endocrine: Negative.  Negative for polyuria.   Hematologic/Lymphatic: Negative for bleeding problem. Does not bruise/bleed easily.   Skin: Negative.  Negative for rash.   Musculoskeletal: Negative.  Negative for joint pain and muscle weakness.   Gastrointestinal: Negative.  Negative for abdominal pain and change in bowel habit.   Genitourinary: Negative for frequency.   Neurological: Negative.  Negative for dizziness, light-headedness and weakness.   Psychiatric/Behavioral: Negative.  Negative for depression. The patient is not nervous/anxious.    Allergic/Immunologic: Negative for environmental allergies.       Objective:          /73   Pulse 92   Ht 6' (1.829 m)   Wt 103 kg  (227 lb 1.2 oz)   BMI 30.80 kg/m²     Physical Exam  Vitals and nursing note reviewed.   Constitutional:       Appearance: He is well-developed.   HENT:      Head: Normocephalic and atraumatic.   Eyes:      General: Lids are normal. No scleral icterus.     Conjunctiva/sclera: Conjunctivae normal.   Neck:      Thyroid: No thyromegaly.      Vascular: No JVD.      Trachea: No tracheal deviation.   Cardiovascular:      Rate and Rhythm: Normal rate and regular rhythm.      Pulses: Intact distal pulses.           Radial pulses are 2+ on the right side and 2+ on the left side.      Heart sounds: Normal heart sounds, S1 normal and S2 normal.   Pulmonary:      Effort: Pulmonary effort is normal. No tachypnea, accessory muscle usage or respiratory distress.      Breath sounds: Normal breath sounds. No wheezing.   Abdominal:      General: There is no distension.   Musculoskeletal:         General: Normal range of motion.      Cervical back: Normal range of motion.   Skin:     General: Skin is warm and dry.   Neurological:      Mental Status: He is alert and oriented to person, place, and time.      Motor: No abnormal muscle tone.      Deep Tendon Reflexes: Reflexes are normal and symmetric.   Psychiatric:         Behavior: Behavior normal.       Lab Results   Component Value Date     (L) 01/21/2022    K 4.2 01/21/2022    MG 1.4 (L) 01/21/2022    BUN 28 (H) 01/21/2022    CREATININE 1.3 01/21/2022    ALT 33 01/21/2022    AST 17 01/21/2022    HGB 11.1 (L) 01/21/2022    HCT 34.5 (L) 01/21/2022    HCT 33 (L) 01/06/2022    TSH 2.671 11/10/2021    LDLCALC 69.2 06/29/2021       Recent Labs   Lab 01/10/22  0605 01/11/22  0650 01/12/22  0650 01/13/22  0635   INR 1.1 1.2 1.2 1.2         Assessment:     1. Paroxysmal A-fib    2. Pure hypercholesterolemia    3. History of cardioversion    4. Coronary artery disease involving native coronary artery of native heart without angina pectoris    5. Persistent atrial fibrillation    6. S/P  liver transplant        Plan:     In summary, Mr. Purdy is a 66 y.o. male with AF, HTN, PAD, DM here for follow up after OLT.    Maintaining NSR on amio; continue for now.  On f/u in 6 mos, we'll consider continuing amio, vs. trying other options (e.g., trial of return to a 1C agent, or tikosyn, or PVI).

## 2022-01-21 NOTE — PROGRESS NOTES
Reviewed pathology report and patient given a copy.  Patient understands he has a cholangiocarcinoma and will be referred to oncology for any further treatment recommendations.  He will need ongoing surveillance.  All questions answered.

## 2022-01-22 ENCOUNTER — PATIENT MESSAGE (OUTPATIENT)
Dept: TRANSPLANT | Facility: CLINIC | Age: 67
End: 2022-01-22
Payer: MEDICARE

## 2022-01-24 ENCOUNTER — TELEPHONE (OUTPATIENT)
Dept: TRANSPLANT | Facility: CLINIC | Age: 67
End: 2022-01-24
Payer: MEDICARE

## 2022-01-24 ENCOUNTER — LAB VISIT (OUTPATIENT)
Dept: LAB | Facility: HOSPITAL | Age: 67
End: 2022-01-24
Attending: SURGERY
Payer: MEDICARE

## 2022-01-24 DIAGNOSIS — Z94.4 LIVER REPLACED BY TRANSPLANT: ICD-10-CM

## 2022-01-24 DIAGNOSIS — E83.42 HYPOMAGNESEMIA: ICD-10-CM

## 2022-01-24 LAB
ALBUMIN SERPL BCP-MCNC: 3.2 G/DL (ref 3.5–5.2)
ALP SERPL-CCNC: 61 U/L (ref 55–135)
ALT SERPL W/O P-5'-P-CCNC: 22 U/L (ref 10–44)
ANION GAP SERPL CALC-SCNC: 9 MMOL/L (ref 8–16)
AST SERPL-CCNC: 15 U/L (ref 10–40)
BASOPHILS # BLD AUTO: 0.03 K/UL (ref 0–0.2)
BASOPHILS NFR BLD: 0.4 % (ref 0–1.9)
BILIRUB DIRECT SERPL-MCNC: 0.3 MG/DL (ref 0.1–0.3)
BILIRUB SERPL-MCNC: 0.7 MG/DL (ref 0.1–1)
BUN SERPL-MCNC: 29 MG/DL (ref 8–23)
CALCIUM SERPL-MCNC: 8.8 MG/DL (ref 8.7–10.5)
CHLORIDE SERPL-SCNC: 103 MMOL/L (ref 95–110)
CO2 SERPL-SCNC: 26 MMOL/L (ref 23–29)
CREAT SERPL-MCNC: 1.2 MG/DL (ref 0.5–1.4)
DIFFERENTIAL METHOD: ABNORMAL
EOSINOPHIL # BLD AUTO: 0 K/UL (ref 0–0.5)
EOSINOPHIL NFR BLD: 0.6 % (ref 0–8)
ERYTHROCYTE [DISTWIDTH] IN BLOOD BY AUTOMATED COUNT: 14.9 % (ref 11.5–14.5)
EST. GFR  (AFRICAN AMERICAN): >60 ML/MIN/1.73 M^2
EST. GFR  (NON AFRICAN AMERICAN): >60 ML/MIN/1.73 M^2
GLUCOSE SERPL-MCNC: 143 MG/DL (ref 70–110)
HCT VFR BLD AUTO: 31.8 % (ref 40–54)
HGB BLD-MCNC: 10.1 G/DL (ref 14–18)
IMM GRANULOCYTES # BLD AUTO: 0.04 K/UL (ref 0–0.04)
IMM GRANULOCYTES NFR BLD AUTO: 0.6 % (ref 0–0.5)
LYMPHOCYTES # BLD AUTO: 0.9 K/UL (ref 1–4.8)
LYMPHOCYTES NFR BLD: 12.2 % (ref 18–48)
MAGNESIUM SERPL-MCNC: 1.3 MG/DL (ref 1.6–2.6)
MCH RBC QN AUTO: 30.5 PG (ref 27–31)
MCHC RBC AUTO-ENTMCNC: 31.8 G/DL (ref 32–36)
MCV RBC AUTO: 96 FL (ref 82–98)
MONOCYTES # BLD AUTO: 0.4 K/UL (ref 0.3–1)
MONOCYTES NFR BLD: 5.7 % (ref 4–15)
NEUTROPHILS # BLD AUTO: 5.8 K/UL (ref 1.8–7.7)
NEUTROPHILS NFR BLD: 80.5 % (ref 38–73)
NRBC BLD-RTO: 0 /100 WBC
PLATELET # BLD AUTO: 154 K/UL (ref 150–450)
PMV BLD AUTO: 10.3 FL (ref 9.2–12.9)
POTASSIUM SERPL-SCNC: 4.4 MMOL/L (ref 3.5–5.1)
PROT SERPL-MCNC: 5.8 G/DL (ref 6–8.4)
RBC # BLD AUTO: 3.31 M/UL (ref 4.6–6.2)
SODIUM SERPL-SCNC: 138 MMOL/L (ref 136–145)
TACROLIMUS BLD-MCNC: 7.1 NG/ML (ref 5–15)
WBC # BLD AUTO: 7.14 K/UL (ref 3.9–12.7)

## 2022-01-24 PROCEDURE — 36415 COLL VENOUS BLD VENIPUNCTURE: CPT | Performed by: SURGERY

## 2022-01-24 PROCEDURE — 85025 COMPLETE CBC W/AUTO DIFF WBC: CPT | Performed by: SURGERY

## 2022-01-24 PROCEDURE — 83735 ASSAY OF MAGNESIUM: CPT | Performed by: SURGERY

## 2022-01-24 PROCEDURE — 80053 COMPREHEN METABOLIC PANEL: CPT | Performed by: SURGERY

## 2022-01-24 PROCEDURE — 80197 ASSAY OF TACROLIMUS: CPT | Performed by: SURGERY

## 2022-01-24 PROCEDURE — 82248 BILIRUBIN DIRECT: CPT | Performed by: SURGERY

## 2022-01-24 NOTE — TELEPHONE ENCOUNTER
Called patient to let him know labs stable, no changes.  Will repeat labs Monday, closer to his home.  Will be seen in clinic tomorrow.    ----- Message from Werner Zamarripa MD sent at 1/24/2022  2:12 PM CST -----  Results ok. No action needed

## 2022-01-25 ENCOUNTER — CONFERENCE (OUTPATIENT)
Dept: TRANSPLANT | Facility: CLINIC | Age: 67
End: 2022-01-25
Payer: MEDICARE

## 2022-01-25 ENCOUNTER — PATIENT MESSAGE (OUTPATIENT)
Dept: TRANSPLANT | Facility: CLINIC | Age: 67
End: 2022-01-25

## 2022-01-25 ENCOUNTER — OFFICE VISIT (OUTPATIENT)
Dept: TRANSPLANT | Facility: CLINIC | Age: 67
End: 2022-01-25
Payer: MEDICARE

## 2022-01-25 VITALS
HEART RATE: 98 BPM | DIASTOLIC BLOOD PRESSURE: 60 MMHG | BODY MASS INDEX: 30.7 KG/M2 | WEIGHT: 226.63 LBS | OXYGEN SATURATION: 92 % | RESPIRATION RATE: 18 BRPM | SYSTOLIC BLOOD PRESSURE: 122 MMHG | TEMPERATURE: 98 F | HEIGHT: 72 IN

## 2022-01-25 DIAGNOSIS — Z79.899 ENCOUNTER FOR LONG-TERM (CURRENT) USE OF OTHER MEDICATIONS: ICD-10-CM

## 2022-01-25 DIAGNOSIS — Z51.81 ENCOUNTER FOR THERAPEUTIC DRUG MONITORING: Primary | ICD-10-CM

## 2022-01-25 DIAGNOSIS — Z94.4 LIVER REPLACED BY TRANSPLANT: ICD-10-CM

## 2022-01-25 PROCEDURE — 3078F DIAST BP <80 MM HG: CPT | Mod: CPTII,S$GLB,, | Performed by: TRANSPLANT SURGERY

## 2022-01-25 PROCEDURE — 3008F BODY MASS INDEX DOCD: CPT | Mod: CPTII,S$GLB,, | Performed by: TRANSPLANT SURGERY

## 2022-01-25 PROCEDURE — 99999 PR PBB SHADOW E&M-EST. PATIENT-LVL III: CPT | Mod: PBBFAC,,,

## 2022-01-25 PROCEDURE — 3008F PR BODY MASS INDEX (BMI) DOCUMENTED: ICD-10-PCS | Mod: CPTII,S$GLB,, | Performed by: TRANSPLANT SURGERY

## 2022-01-25 PROCEDURE — 3074F PR MOST RECENT SYSTOLIC BLOOD PRESSURE < 130 MM HG: ICD-10-PCS | Mod: CPTII,S$GLB,, | Performed by: TRANSPLANT SURGERY

## 2022-01-25 PROCEDURE — 99999 PR PBB SHADOW E&M-EST. PATIENT-LVL III: ICD-10-PCS | Mod: PBBFAC,,,

## 2022-01-25 PROCEDURE — 1111F DSCHRG MED/CURRENT MED MERGE: CPT | Mod: CPTII,S$GLB,, | Performed by: TRANSPLANT SURGERY

## 2022-01-25 PROCEDURE — 3078F PR MOST RECENT DIASTOLIC BLOOD PRESSURE < 80 MM HG: ICD-10-PCS | Mod: CPTII,S$GLB,, | Performed by: TRANSPLANT SURGERY

## 2022-01-25 PROCEDURE — 1111F PR DISCHARGE MEDS RECONCILED W/ CURRENT OUTPATIENT MED LIST: ICD-10-PCS | Mod: CPTII,S$GLB,, | Performed by: TRANSPLANT SURGERY

## 2022-01-25 PROCEDURE — 99215 OFFICE O/P EST HI 40 MIN: CPT | Mod: 24,S$GLB,, | Performed by: TRANSPLANT SURGERY

## 2022-01-25 PROCEDURE — 3074F SYST BP LT 130 MM HG: CPT | Mod: CPTII,S$GLB,, | Performed by: TRANSPLANT SURGERY

## 2022-01-25 PROCEDURE — 4010F ACE/ARB THERAPY RXD/TAKEN: CPT | Mod: CPTII,S$GLB,, | Performed by: TRANSPLANT SURGERY

## 2022-01-25 PROCEDURE — 3044F PR MOST RECENT HEMOGLOBIN A1C LEVEL <7.0%: ICD-10-PCS | Mod: CPTII,S$GLB,, | Performed by: TRANSPLANT SURGERY

## 2022-01-25 PROCEDURE — 1159F MED LIST DOCD IN RCRD: CPT | Mod: CPTII,S$GLB,, | Performed by: TRANSPLANT SURGERY

## 2022-01-25 PROCEDURE — 1125F PR PAIN SEVERITY QUANTIFIED, PAIN PRESENT: ICD-10-PCS | Mod: CPTII,S$GLB,, | Performed by: TRANSPLANT SURGERY

## 2022-01-25 PROCEDURE — 4010F PR ACE/ARB THEARPY RXD/TAKEN: ICD-10-PCS | Mod: CPTII,S$GLB,, | Performed by: TRANSPLANT SURGERY

## 2022-01-25 PROCEDURE — 3044F HG A1C LEVEL LT 7.0%: CPT | Mod: CPTII,S$GLB,, | Performed by: TRANSPLANT SURGERY

## 2022-01-25 PROCEDURE — 1125F AMNT PAIN NOTED PAIN PRSNT: CPT | Mod: CPTII,S$GLB,, | Performed by: TRANSPLANT SURGERY

## 2022-01-25 PROCEDURE — 99215 PR OFFICE/OUTPT VISIT, EST, LEVL V, 40-54 MIN: ICD-10-PCS | Mod: 24,S$GLB,, | Performed by: TRANSPLANT SURGERY

## 2022-01-25 PROCEDURE — 1159F PR MEDICATION LIST DOCUMENTED IN MEDICAL RECORD: ICD-10-PCS | Mod: CPTII,S$GLB,, | Performed by: TRANSPLANT SURGERY

## 2022-01-25 NOTE — PROGRESS NOTES
Transplant Surgery  Liver Transplant Recipient Follow-up    Original Referring Physician: Jessika Carbone  Current Corresponding Physician: Jessika Carbone    Chief Complaint: Tej is here for follow up of his liver transplant performed 1/6/2022 for the primary diagnosis (UNOS) of Primary Liver Malignancy: Hepatoma (HCC) and Cirrhosis    ORGAN: LIVER  Whole or Partial: whole liver  Donor Type: donation after brain death  PHS Increased Risk: no  Donor CMV Status: Positive  Donor HCV Status: Negative  Donor HBcAb: Negative  Donor HBV JAYLIN: Negative  Donor HCV JAYLIN: Negative    Biliary Anastomosis: end to end  Arterial Anatomy: replaced left hepatic from left gastric  IVC reconstruction: end to end ivc  Portal vein status: patent    Subjective:     History of Present Illness: He has had the following complications since transplant: none.  The noted complications are well controlled.    Interval History: Currently, he is doing adequately.  Current complaints include none.  Tej is here for management of his immunosuppression medication.    External provider notes reviewed: Yes    Review of Systems   Constitutional: Negative for activity change and appetite change.   HENT: Negative for congestion and tinnitus.    Eyes: Negative for redness and visual disturbance.   Respiratory: Negative for cough and shortness of breath.    Cardiovascular: Negative for chest pain and palpitations.   Gastrointestinal: Positive for abdominal distention. Negative for abdominal pain.   Endocrine: Negative for polydipsia and polyuria.   Genitourinary: Negative for decreased urine volume and dysuria.   Musculoskeletal: Negative for arthralgias and back pain.   Skin: Negative for pallor and rash.   Allergic/Immunologic: Positive for immunocompromised state. Negative for environmental allergies.   Neurological: Negative for tremors and headaches.   Hematological: Negative for adenopathy. Does not bruise/bleed easily.   Psychiatric/Behavioral:  Negative for behavioral problems and confusion.     Objective:     Physical Exam  Constitutional:       General: He is not in acute distress.     Appearance: He is well-developed and well-nourished. He is not diaphoretic.   HENT:      Head: Normocephalic.   Eyes:      General: No scleral icterus.  Neck:      Vascular: No JVD.   Cardiovascular:      Rate and Rhythm: Normal rate and regular rhythm.      Pulses: Intact distal pulses.   Pulmonary:      Effort: Pulmonary effort is normal. No respiratory distress.   Abdominal:      Palpations: Abdomen is soft. There is no mass.      Tenderness: There is no guarding or rebound.       Musculoskeletal:         General: Normal range of motion.      Cervical back: Normal range of motion and neck supple.   Skin:     General: Skin is warm and dry.   Neurological:      Mental Status: He is alert and oriented to person, place, and time.   Psychiatric:         Mood and Affect: Mood and affect normal.         Behavior: Behavior normal.         Thought Content: Thought content normal.         Judgment: Judgment normal.       Lab Results   Component Value Date    BILITOT 0.7 01/24/2022    AST 15 01/24/2022    ALT 22 01/24/2022    ALKPHOS 61 01/24/2022    CREATININE 1.2 01/24/2022    ALBUMIN 3.2 (L) 01/24/2022     Lab Results   Component Value Date    WBC 7.14 01/24/2022    HGB 10.1 (L) 01/24/2022    HCT 31.8 (L) 01/24/2022    HCT 33 (L) 01/06/2022     01/24/2022     Lab Results   Component Value Date    TACROLIMUS 7.1 01/24/2022       Diagnostics:  The following labs have been reviewed: CBC  CMP  TACROLIMUS LEVEL  The following radiology images have been independently reviewed and interpreted:     Assessment/Plan:          · S/P liver transplant.  · Chronic immunosuppressive medications for rejection prophylaxis at target.  Plan: no adjustment needed.  · Continue monitoring symptoms, labs and drug levels for drug-related toxicity and side effects.  · Incision: staples in place;  wound clean, dry, and intact - Remove staples  · Femoral arterial line site: no complications evident   · Appt with medical oncology tomorrow to discuss potential role for systemic therapy    Additional testing to be completed according to Written Order Guidelines for Post-Liver and Combined Liver/Kidney Transplant Follow-up (LI-09)    Interpretation of tests and discussion of patient management involves all members of the multidisciplinary transplant team  Patient advised that it is recommended that all transplanted patients, and their close contacts and household members receive Covid vaccination.  Melvin Lozada MD       [facility] Patient Status  Functional Status: 50% - Requires considerable assistance and frequent medical care  Physical Capacity: Limited Mobility

## 2022-01-25 NOTE — TELEPHONE ENCOUNTER
Patient: Tej Purdy       MRN: [id number]      : 1955     Age: 66 y.o.  [address]  [address] [place] [address]    Providers  Hepatologists: Thais Maxwell MD  Surgeons: Lizet Garcia MD  Radiologists: Pierre Dobbs MD  Advanced Practice providers:     Priority of review: Cancer- explant shows cholangiocarcinoma and not HCC; review pre-transplant films -we treated him like he had HCC    Patient Transplant Status: Other post-transplant    Reason for presentation: Reassessment of films pre transplant that led to diagnosis of HCC    Clinical Summary: post transplant; pre transplant had TACE 20  RFA 20    Imaging to be reviewed: pre-transplant films  HCC Treatment History: as above    ABO: O NEG    Platelets:   Lab Results   Component Value Date/Time     2022 09:05 AM     Creatinine:   Lab Results   Component Value Date/Time    CREATININE 1.2 2022 09:05 AM     Bilirubin:   Lab Results   Component Value Date/Time    BILITOT 0.7 2022 09:05 AM     AFP Last 3 each if available:   Lab Results   Component Value Date/Time    AFP 3.1 11/10/2021 11:45 AM    AFP 3.1 2021 11:50 AM    AFP 2.6 2021 01:10 PM       MELD: MELD-Na score: 9 at 1/15/2022  8:09 AM  MELD score: 9 at 1/15/2022  8:09 AM  Calculated from:  Serum Creatinine: 1.1 mg/dL at 1/15/2022  8:09 AM  Serum Sodium: 135 mmol/L at 1/15/2022  8:09 AM  Total Bilirubin: 0.8 mg/dL (Using min of 1 mg/dL) at 1/15/2022  8:09 AM  INR(ratio): 1.2 at 2022  6:35 AM  Age: 66 years    Plan: Initial imaging had 3.2 cm lesion with enhancement, washout and pseudocapsule in segment 5/6. Patient had TACE/ablation and developed large hypodense area following procedure that was believed to represent infarct. this gradually decreased in size, but never fully regressed. This area never demonstrated enhancement following treatment.      Committee Discussion: First imaging we have is an outside study done 10/2020  which showed a 3.5 cm mass demonstrating arterial enhancement; delayed phase shows pseudocapsule and washout, all of which are consistent with HCC. No capsular retraction or ductal dilatation. All of this on CT would favor HCC. s/p TACE/Ablate w/development of infarct post procedure (??abscess). This samreen would gradually decrease over time on surveillance imaging. ~10 months after treatment the ablation cavity; still has area of what looks like infarct with no real arterial enhancement or duct dilatation.  Stable. November study shows ablation cavity with a somewhat smaller area of hypodensity. All favors cancer resolution.     Rec: Proceed with Oncology as scheduled, will discuss  adjuvant therapy.     Note forwarded to Aniya Amaya RN to coordinate follow-up.     Follow-up Provider: Thais Maxwell MD

## 2022-01-25 NOTE — PROGRESS NOTES
STAPLE REMOVAL NOTE    Staples removed from liver transplant incision, steri-strips applied with Benzoin per Dr. Lozada's order.  Patient tolerated procedure well.  Skin dry and intact.  Patient instructed to shower with back to water spray and to pat dry incision and to let the steri-strips wear off on their own.  Patient instructed to report any redness, warmth, or drainage from incision to transplant coordinators.  All questions answered.

## 2022-01-26 ENCOUNTER — TELEPHONE (OUTPATIENT)
Dept: TRANSPLANT | Facility: CLINIC | Age: 67
End: 2022-01-26
Payer: MEDICARE

## 2022-01-26 ENCOUNTER — RESEARCH ENCOUNTER (OUTPATIENT)
Dept: RESEARCH | Facility: HOSPITAL | Age: 67
End: 2022-01-26
Payer: MEDICARE

## 2022-01-26 ENCOUNTER — LAB VISIT (OUTPATIENT)
Dept: TRANSPLANT | Facility: CLINIC | Age: 67
End: 2022-01-26
Payer: MEDICARE

## 2022-01-26 DIAGNOSIS — U07.1 COVID-19: Primary | ICD-10-CM

## 2022-01-26 DIAGNOSIS — R05.9 COUGH: Primary | ICD-10-CM

## 2022-01-26 LAB — SARS-COV-2 RDRP RESP QL NAA+PROBE: POSITIVE

## 2022-01-26 PROCEDURE — U0002 COVID-19 LAB TEST NON-CDC: HCPCS | Performed by: SURGERY

## 2022-01-26 NOTE — TELEPHONE ENCOUNTER
Called patient to let him know he has to go to the [facility] to get the rapid COVID test done.  He will need to make a appointment on My chart.

## 2022-01-26 NOTE — PROGRESS NOTES
The Patient was called today regarding the patient's participation in (IRB #[id number] PI: Mynor).   The Verbal Informed Consent was read and discussed by the consenter. The following was discussed:   Types of specimens to be collected   All medical information released to researchers will be stripped of identifiers and no patient information will be given to anyone outside of this research project.    Participating in a research study is not the same as getting regular medical care and will not improve the patient's health. The purpose of a research study is to gather information.  Being in this study does not interfere with your regular medical care.   The patient/LAR does not have to participate in this study. If they do not join, their care at [facility] will not be affected.  The person granting permission was provided adequate time to ask questions regarding the scope and purpose of the study.  Permission was obtained by telephone.   The above statements were read by the person obtaining permission to the person granting permission and witnessed by Dona Lowe, PhD, who also confirmed the patient's consent to the study. The witness information was documented on the verbal consent form as well.  This Verbal Informed Consent process was conducted prior to initiation of any study procedures.

## 2022-01-27 ENCOUNTER — PATIENT MESSAGE (OUTPATIENT)
Dept: TRANSPLANT | Facility: CLINIC | Age: 67
End: 2022-01-27
Payer: MEDICARE

## 2022-01-27 ENCOUNTER — PATIENT MESSAGE (OUTPATIENT)
Dept: ADMINISTRATIVE | Facility: OTHER | Age: 67
End: 2022-01-27
Payer: MEDICARE

## 2022-01-27 ENCOUNTER — PATIENT MESSAGE (OUTPATIENT)
Dept: RESEARCH | Facility: HOSPITAL | Age: 67
End: 2022-01-27
Payer: MEDICARE

## 2022-01-27 ENCOUNTER — INFUSION (OUTPATIENT)
Dept: INFECTIOUS DISEASES | Facility: HOSPITAL | Age: 67
End: 2022-01-27
Attending: INTERNAL MEDICINE
Payer: MEDICARE

## 2022-01-27 VITALS
WEIGHT: 220 LBS | BODY MASS INDEX: 29.8 KG/M2 | TEMPERATURE: 98 F | HEIGHT: 72 IN | SYSTOLIC BLOOD PRESSURE: 117 MMHG | DIASTOLIC BLOOD PRESSURE: 58 MMHG | RESPIRATION RATE: 18 BRPM | HEART RATE: 88 BPM | OXYGEN SATURATION: 98 %

## 2022-01-27 DIAGNOSIS — U07.1 COVID-19: ICD-10-CM

## 2022-01-27 DIAGNOSIS — U07.1 COVID-19: Primary | ICD-10-CM

## 2022-01-27 PROCEDURE — 25000003 PHARM REV CODE 250: Performed by: INTERNAL MEDICINE

## 2022-01-27 PROCEDURE — M0247 HC IV INFUSION, SOTROVIMAB, INCL POST ADMIN MONIT: HCPCS | Performed by: INTERNAL MEDICINE

## 2022-01-27 PROCEDURE — 63600175 PHARM REV CODE 636 W HCPCS: Performed by: INTERNAL MEDICINE

## 2022-01-27 RX ORDER — EPINEPHRINE 0.3 MG/.3ML
0.3 INJECTION SUBCUTANEOUS
Status: DISCONTINUED | OUTPATIENT
Start: 2022-01-27 | End: 2022-02-06 | Stop reason: HOSPADM

## 2022-01-27 RX ORDER — ACETAMINOPHEN 325 MG/1
650 TABLET ORAL ONCE AS NEEDED
Status: DISCONTINUED | OUTPATIENT
Start: 2022-01-27 | End: 2022-02-06 | Stop reason: HOSPADM

## 2022-01-27 RX ORDER — ALBUTEROL SULFATE 90 UG/1
2 AEROSOL, METERED RESPIRATORY (INHALATION)
Status: DISCONTINUED | OUTPATIENT
Start: 2022-01-27 | End: 2022-02-06 | Stop reason: HOSPADM

## 2022-01-27 RX ORDER — SODIUM CHLORIDE 0.9 % (FLUSH) 0.9 %
10 SYRINGE (ML) INJECTION
Status: DISCONTINUED | OUTPATIENT
Start: 2022-01-27 | End: 2022-02-06 | Stop reason: HOSPADM

## 2022-01-27 RX ORDER — DIPHENHYDRAMINE HYDROCHLORIDE 50 MG/ML
25 INJECTION INTRAMUSCULAR; INTRAVENOUS ONCE AS NEEDED
Status: DISCONTINUED | OUTPATIENT
Start: 2022-01-27 | End: 2022-02-06 | Stop reason: HOSPADM

## 2022-01-27 RX ORDER — ONDANSETRON 4 MG/1
4 TABLET, ORALLY DISINTEGRATING ORAL ONCE AS NEEDED
Status: DISCONTINUED | OUTPATIENT
Start: 2022-01-27 | End: 2022-02-06 | Stop reason: HOSPADM

## 2022-01-27 RX ADMIN — SODIUM CHLORIDE 500 MG: 9 INJECTION, SOLUTION INTRAVENOUS at 01:01

## 2022-01-27 NOTE — PROGRESS NOTES
Patient remains with no signs of complications noted. Patient received sotrovimab infusion with filtered tubing according to FDA recommendations and [facility] SOP without complications noted and left with mask in place. Drug fact sheet provided. Pt discharged home. Ambulatory. Remains AAox3. No distress noted. RR even and unlabored.

## 2022-01-27 NOTE — PROGRESS NOTES
Patient remains with no signs of complications noted. Patient received sotrovimab infusion with filtered tubing according to FDA recommendations and [facility] SOP without complications noted and left with mask in place. Drug fact sheet provided. Pt discharged home. Ambulatory. Remains AAox3. No distress noted. RR even and unlabored.

## 2022-01-27 NOTE — PROGRESS NOTES
Patient arrives for sotrovimab infusion. Ambulatory. Pt AAox3. No distress noted. RR even and unlabored.     Symptoms and onset date: Cough, weakness. Onset 1/24/22.      Tested COVID + on 1/26/22.

## 2022-01-31 ENCOUNTER — TELEPHONE (OUTPATIENT)
Dept: HEMATOLOGY/ONCOLOGY | Facility: CLINIC | Age: 67
End: 2022-01-31
Payer: MEDICARE

## 2022-01-31 ENCOUNTER — TELEPHONE (OUTPATIENT)
Dept: TRANSPLANT | Facility: CLINIC | Age: 67
End: 2022-01-31
Payer: MEDICARE

## 2022-01-31 ENCOUNTER — PATIENT MESSAGE (OUTPATIENT)
Dept: TRANSPLANT | Facility: CLINIC | Age: 67
End: 2022-01-31
Payer: MEDICARE

## 2022-01-31 DIAGNOSIS — Z94.4 LIVER REPLACED BY TRANSPLANT: Primary | ICD-10-CM

## 2022-01-31 NOTE — TELEPHONE ENCOUNTER
Called to see why patient canceled labs.  He reports just feeling tired when he does stuff.  I will let Dr Zamarripa know and let patient know of any recommendation.s  He will try to go to lab tomorrow by his home.

## 2022-02-01 ENCOUNTER — HOSPITAL ENCOUNTER (INPATIENT)
Facility: HOSPITAL | Age: 67
LOS: 5 days | Discharge: HOME | DRG: 441 | End: 2022-02-06
Attending: TRANSPLANT SURGERY | Admitting: TRANSPLANT SURGERY
Payer: MEDICARE

## 2022-02-01 ENCOUNTER — TELEPHONE (OUTPATIENT)
Dept: TRANSPLANT | Facility: CLINIC | Age: 67
End: 2022-02-01
Payer: MEDICARE

## 2022-02-01 DIAGNOSIS — D63.8 ANEMIA OF CHRONIC DISEASE: ICD-10-CM

## 2022-02-01 DIAGNOSIS — E78.5 HYPERLIPIDEMIA, UNSPECIFIED HYPERLIPIDEMIA TYPE: ICD-10-CM

## 2022-02-01 DIAGNOSIS — Z29.89 PROPHYLACTIC IMMUNOTHERAPY: ICD-10-CM

## 2022-02-01 DIAGNOSIS — Z79.4 TYPE 2 DIABETES MELLITUS WITH DIABETIC NEPHROPATHY, WITH LONG-TERM CURRENT USE OF INSULIN: ICD-10-CM

## 2022-02-01 DIAGNOSIS — Z91.89 AT RISK FOR OPPORTUNISTIC INFECTIONS: ICD-10-CM

## 2022-02-01 DIAGNOSIS — Z94.4 S/P LIVER TRANSPLANT: ICD-10-CM

## 2022-02-01 DIAGNOSIS — R53.1 WEAKNESS: ICD-10-CM

## 2022-02-01 DIAGNOSIS — D64.9 ANEMIA DUE TO UNKNOWN MECHANISM: Primary | ICD-10-CM

## 2022-02-01 DIAGNOSIS — I48.91 ATRIAL FIBRILLATION: ICD-10-CM

## 2022-02-01 DIAGNOSIS — Z79.4 TYPE 2 DIABETES MELLITUS WITH HYPERGLYCEMIA, WITH LONG-TERM CURRENT USE OF INSULIN: ICD-10-CM

## 2022-02-01 DIAGNOSIS — I48.19 PERSISTENT ATRIAL FIBRILLATION: ICD-10-CM

## 2022-02-01 DIAGNOSIS — E11.42 DIABETIC POLYNEUROPATHY ASSOCIATED WITH TYPE 2 DIABETES MELLITUS: ICD-10-CM

## 2022-02-01 DIAGNOSIS — E11.65 TYPE 2 DIABETES MELLITUS WITH HYPERGLYCEMIA, WITH LONG-TERM CURRENT USE OF INSULIN: ICD-10-CM

## 2022-02-01 DIAGNOSIS — Z79.60 LONG-TERM USE OF IMMUNOSUPPRESSANT MEDICATION: ICD-10-CM

## 2022-02-01 DIAGNOSIS — U07.1 COVID-19: ICD-10-CM

## 2022-02-01 DIAGNOSIS — E11.21 TYPE 2 DIABETES MELLITUS WITH DIABETIC NEPHROPATHY, WITH LONG-TERM CURRENT USE OF INSULIN: ICD-10-CM

## 2022-02-01 LAB
ABO + RH BLD: NORMAL
ALBUMIN SERPL BCP-MCNC: 3 G/DL (ref 3.5–5.2)
ALP SERPL-CCNC: 47 U/L (ref 55–135)
ALT SERPL W/O P-5'-P-CCNC: 12 U/L (ref 10–44)
ANION GAP SERPL CALC-SCNC: 7 MMOL/L (ref 8–16)
ANISOCYTOSIS BLD QL SMEAR: SLIGHT
AST SERPL-CCNC: 11 U/L (ref 10–40)
BASO STIPL BLD QL SMEAR: ABNORMAL
BASOPHILS # BLD AUTO: 0.01 K/UL (ref 0–0.2)
BASOPHILS NFR BLD: 0.2 % (ref 0–1.9)
BILIRUB SERPL-MCNC: 0.5 MG/DL (ref 0.1–1)
BLD GP AB SCN CELLS X3 SERPL QL: NORMAL
BLD PROD TYP BPU: NORMAL
BLOOD UNIT EXPIRATION DATE: NORMAL
BLOOD UNIT TYPE CODE: 9500
BLOOD UNIT TYPE: NORMAL
BNP SERPL-MCNC: 45 PG/ML (ref 0–99)
BUN SERPL-MCNC: 30 MG/DL (ref 8–23)
CALCIUM SERPL-MCNC: 9.2 MG/DL (ref 8.7–10.5)
CHLORIDE SERPL-SCNC: 97 MMOL/L (ref 95–110)
CK SERPL-CCNC: 30 U/L (ref 20–200)
CO2 SERPL-SCNC: 27 MMOL/L (ref 23–29)
CODING SYSTEM: NORMAL
CREAT SERPL-MCNC: 1.3 MG/DL (ref 0.5–1.4)
CRP SERPL-MCNC: 6.7 MG/L (ref 0–8.2)
D DIMER PPP IA.FEU-MCNC: 1.75 MG/L FEU
DIFFERENTIAL METHOD: ABNORMAL
DISPENSE STATUS: NORMAL
EOSINOPHIL # BLD AUTO: 0 K/UL (ref 0–0.5)
EOSINOPHIL NFR BLD: 0.5 % (ref 0–8)
ERYTHROCYTE [DISTWIDTH] IN BLOOD BY AUTOMATED COUNT: 16.4 % (ref 11.5–14.5)
EST. GFR  (AFRICAN AMERICAN): >60 ML/MIN/1.73 M^2
EST. GFR  (NON AFRICAN AMERICAN): 56.1 ML/MIN/1.73 M^2
FERRITIN SERPL-MCNC: 77 NG/ML (ref 20–300)
FERRITIN SERPL-MCNC: 81 NG/ML (ref 20–300)
GLUCOSE SERPL-MCNC: 269 MG/DL (ref 70–110)
HAPTOGLOB SERPL-MCNC: 135 MG/DL (ref 30–250)
HCT VFR BLD AUTO: 16.8 % (ref 40–54)
HCT VFR BLD AUTO: 17.9 % (ref 40–54)
HGB BLD-MCNC: 5.2 G/DL (ref 14–18)
HGB BLD-MCNC: 5.5 G/DL (ref 14–18)
HYPOCHROMIA BLD QL SMEAR: ABNORMAL
IMM GRANULOCYTES # BLD AUTO: 0.11 K/UL (ref 0–0.04)
IMM GRANULOCYTES NFR BLD AUTO: 1.8 % (ref 0–0.5)
INR PPP: 1.2 (ref 0.8–1.2)
IRON SERPL-MCNC: 22 UG/DL (ref 45–160)
LDH SERPL L TO P-CCNC: 139 U/L (ref 110–260)
LDH SERPL L TO P-CCNC: 160 U/L (ref 110–260)
LYMPHOCYTES # BLD AUTO: 0.3 K/UL (ref 1–4.8)
LYMPHOCYTES NFR BLD: 5.3 % (ref 18–48)
MAGNESIUM SERPL-MCNC: 1.4 MG/DL (ref 1.6–2.6)
MCH RBC QN AUTO: 30.2 PG (ref 27–31)
MCHC RBC AUTO-ENTMCNC: 30.7 G/DL (ref 32–36)
MCV RBC AUTO: 98 FL (ref 82–98)
MONOCYTES # BLD AUTO: 0.3 K/UL (ref 0.3–1)
MONOCYTES NFR BLD: 5.7 % (ref 4–15)
NEUTROPHILS # BLD AUTO: 5.2 K/UL (ref 1.8–7.7)
NEUTROPHILS NFR BLD: 86.5 % (ref 38–73)
NRBC BLD-RTO: 1 /100 WBC
PLATELET # BLD AUTO: 220 K/UL (ref 150–450)
PMV BLD AUTO: 10.3 FL (ref 9.2–12.9)
POCT GLUCOSE: 304 MG/DL (ref 70–110)
POLYCHROMASIA BLD QL SMEAR: ABNORMAL
POTASSIUM SERPL-SCNC: 4.9 MMOL/L (ref 3.5–5.1)
PROCALCITONIN SERPL IA-MCNC: 0.13 NG/ML
PROT SERPL-MCNC: 5.4 G/DL (ref 6–8.4)
PROTHROMBIN TIME: 12.8 SEC (ref 9–12.5)
RBC # BLD AUTO: 1.82 M/UL (ref 4.6–6.2)
RETICS/RBC NFR AUTO: 10 % (ref 0.4–2)
SATURATED IRON: 5 % (ref 20–50)
SODIUM SERPL-SCNC: 131 MMOL/L (ref 136–145)
TOTAL IRON BINDING CAPACITY: 410 UG/DL (ref 250–450)
TRANS ERYTHROCYTES VOL PATIENT: NORMAL ML
TRANSFERRIN SERPL-MCNC: 277 MG/DL (ref 200–375)
TROPONIN I SERPL DL<=0.01 NG/ML-MCNC: 0.02 NG/ML (ref 0–0.03)
WBC # BLD AUTO: 6.01 K/UL (ref 3.9–12.7)

## 2022-02-01 PROCEDURE — 84484 ASSAY OF TROPONIN QUANT: CPT

## 2022-02-01 PROCEDURE — P9021 RED BLOOD CELLS UNIT: HCPCS | Performed by: NURSE PRACTITIONER

## 2022-02-01 PROCEDURE — 20600001 HC STEP DOWN PRIVATE ROOM

## 2022-02-01 PROCEDURE — 86920 COMPATIBILITY TEST SPIN: CPT | Performed by: PHYSICIAN ASSISTANT

## 2022-02-01 PROCEDURE — 82550 ASSAY OF CK (CPK): CPT

## 2022-02-01 PROCEDURE — 85018 HEMOGLOBIN: CPT | Performed by: NURSE PRACTITIONER

## 2022-02-01 PROCEDURE — 82728 ASSAY OF FERRITIN: CPT

## 2022-02-01 PROCEDURE — 93010 ELECTROCARDIOGRAM REPORT: CPT | Mod: ,,, | Performed by: INTERNAL MEDICINE

## 2022-02-01 PROCEDURE — 93005 ELECTROCARDIOGRAM TRACING: CPT

## 2022-02-01 PROCEDURE — 82955 ASSAY OF G6PD ENZYME: CPT

## 2022-02-01 PROCEDURE — 25000003 PHARM REV CODE 250

## 2022-02-01 PROCEDURE — 85014 HEMATOCRIT: CPT | Performed by: NURSE PRACTITIONER

## 2022-02-01 PROCEDURE — 85045 AUTOMATED RETICULOCYTE COUNT: CPT | Performed by: NURSE PRACTITIONER

## 2022-02-01 PROCEDURE — 84145 PROCALCITONIN (PCT): CPT

## 2022-02-01 PROCEDURE — 99222 1ST HOSP IP/OBS MODERATE 55: CPT | Mod: ,,, | Performed by: NURSE PRACTITIONER

## 2022-02-01 PROCEDURE — 83010 ASSAY OF HAPTOGLOBIN QUANT: CPT | Performed by: NURSE PRACTITIONER

## 2022-02-01 PROCEDURE — 85610 PROTHROMBIN TIME: CPT | Performed by: NURSE PRACTITIONER

## 2022-02-01 PROCEDURE — 99223 PR INITIAL HOSPITAL CARE,LEVL III: ICD-10-PCS | Mod: AI,,,

## 2022-02-01 PROCEDURE — 36415 COLL VENOUS BLD VENIPUNCTURE: CPT

## 2022-02-01 PROCEDURE — 83735 ASSAY OF MAGNESIUM: CPT

## 2022-02-01 PROCEDURE — 83880 ASSAY OF NATRIURETIC PEPTIDE: CPT

## 2022-02-01 PROCEDURE — 25000003 PHARM REV CODE 250: Performed by: NURSE PRACTITIONER

## 2022-02-01 PROCEDURE — 63600175 PHARM REV CODE 636 W HCPCS

## 2022-02-01 PROCEDURE — 85379 FIBRIN DEGRADATION QUANT: CPT

## 2022-02-01 PROCEDURE — 36415 COLL VENOUS BLD VENIPUNCTURE: CPT | Performed by: NURSE PRACTITIONER

## 2022-02-01 PROCEDURE — 86920 COMPATIBILITY TEST SPIN: CPT | Performed by: NURSE PRACTITIONER

## 2022-02-01 PROCEDURE — 83615 LACTATE (LD) (LDH) ENZYME: CPT

## 2022-02-01 PROCEDURE — C9399 UNCLASSIFIED DRUGS OR BIOLOG: HCPCS

## 2022-02-01 PROCEDURE — 99222 PR INITIAL HOSPITAL CARE,LEVL II: ICD-10-PCS | Mod: ,,, | Performed by: NURSE PRACTITIONER

## 2022-02-01 PROCEDURE — 93010 EKG 12-LEAD: ICD-10-PCS | Mod: ,,, | Performed by: INTERNAL MEDICINE

## 2022-02-01 PROCEDURE — 84466 ASSAY OF TRANSFERRIN: CPT | Performed by: NURSE PRACTITIONER

## 2022-02-01 PROCEDURE — 80053 COMPREHEN METABOLIC PANEL: CPT

## 2022-02-01 PROCEDURE — 85025 COMPLETE CBC W/AUTO DIFF WBC: CPT

## 2022-02-01 PROCEDURE — 83615 LACTATE (LD) (LDH) ENZYME: CPT | Mod: 91 | Performed by: NURSE PRACTITIONER

## 2022-02-01 PROCEDURE — 99223 1ST HOSP IP/OBS HIGH 75: CPT | Mod: AI,,,

## 2022-02-01 PROCEDURE — 86140 C-REACTIVE PROTEIN: CPT

## 2022-02-01 PROCEDURE — 87040 BLOOD CULTURE FOR BACTERIA: CPT | Mod: 59

## 2022-02-01 PROCEDURE — 27000207 HC ISOLATION

## 2022-02-01 PROCEDURE — 82728 ASSAY OF FERRITIN: CPT | Mod: 91 | Performed by: NURSE PRACTITIONER

## 2022-02-01 PROCEDURE — 86901 BLOOD TYPING SEROLOGIC RH(D): CPT | Performed by: NURSE PRACTITIONER

## 2022-02-01 RX ORDER — PRAVASTATIN SODIUM 20 MG/1
20 TABLET ORAL DAILY
Status: DISCONTINUED | OUTPATIENT
Start: 2022-02-02 | End: 2022-02-06 | Stop reason: HOSPADM

## 2022-02-01 RX ORDER — VALGANCICLOVIR 450 MG/1
450 TABLET, FILM COATED ORAL DAILY
Status: DISCONTINUED | OUTPATIENT
Start: 2022-02-02 | End: 2022-02-06 | Stop reason: HOSPADM

## 2022-02-01 RX ORDER — BISACODYL 5 MG
10 TABLET, DELAYED RELEASE (ENTERIC COATED) ORAL DAILY PRN
Status: DISCONTINUED | OUTPATIENT
Start: 2022-02-01 | End: 2022-02-06 | Stop reason: HOSPADM

## 2022-02-01 RX ORDER — SERTRALINE HYDROCHLORIDE 25 MG/1
25 TABLET, FILM COATED ORAL DAILY
Status: DISCONTINUED | OUTPATIENT
Start: 2022-02-02 | End: 2022-02-06 | Stop reason: HOSPADM

## 2022-02-01 RX ORDER — TALC
6 POWDER (GRAM) TOPICAL NIGHTLY PRN
Status: DISCONTINUED | OUTPATIENT
Start: 2022-02-01 | End: 2022-02-04

## 2022-02-01 RX ORDER — ACETAMINOPHEN 325 MG/1
650 TABLET ORAL EVERY 8 HOURS PRN
Status: DISCONTINUED | OUTPATIENT
Start: 2022-02-01 | End: 2022-02-06 | Stop reason: HOSPADM

## 2022-02-01 RX ORDER — AMLODIPINE BESYLATE 10 MG/1
10 TABLET ORAL DAILY
Status: DISCONTINUED | OUTPATIENT
Start: 2022-02-02 | End: 2022-02-06 | Stop reason: HOSPADM

## 2022-02-01 RX ORDER — ASPIRIN 81 MG/1
81 TABLET ORAL DAILY
Status: DISCONTINUED | OUTPATIENT
Start: 2022-02-02 | End: 2022-02-01

## 2022-02-01 RX ORDER — ONDANSETRON 2 MG/ML
4 INJECTION INTRAMUSCULAR; INTRAVENOUS EVERY 6 HOURS PRN
Status: DISCONTINUED | OUTPATIENT
Start: 2022-02-01 | End: 2022-02-06 | Stop reason: HOSPADM

## 2022-02-01 RX ORDER — PANTOPRAZOLE SODIUM 40 MG/1
40 TABLET, DELAYED RELEASE ORAL DAILY
Status: DISCONTINUED | OUTPATIENT
Start: 2022-02-02 | End: 2022-02-06 | Stop reason: HOSPADM

## 2022-02-01 RX ORDER — INSULIN ASPART 100 [IU]/ML
4 INJECTION, SOLUTION INTRAVENOUS; SUBCUTANEOUS
Status: DISCONTINUED | OUTPATIENT
Start: 2022-02-01 | End: 2022-02-02

## 2022-02-01 RX ORDER — INSULIN ASPART 100 [IU]/ML
0-5 INJECTION, SOLUTION INTRAVENOUS; SUBCUTANEOUS
Status: DISCONTINUED | OUTPATIENT
Start: 2022-02-01 | End: 2022-02-06 | Stop reason: HOSPADM

## 2022-02-01 RX ORDER — GABAPENTIN 300 MG/1
300 CAPSULE ORAL 3 TIMES DAILY PRN
Status: DISCONTINUED | OUTPATIENT
Start: 2022-02-01 | End: 2022-02-06 | Stop reason: HOSPADM

## 2022-02-01 RX ORDER — PREDNISONE 5 MG/1
5 TABLET ORAL DAILY
Status: DISCONTINUED | OUTPATIENT
Start: 2022-02-02 | End: 2022-02-06 | Stop reason: HOSPADM

## 2022-02-01 RX ORDER — GLUCAGON 1 MG
1 KIT INJECTION
Status: DISCONTINUED | OUTPATIENT
Start: 2022-02-01 | End: 2022-02-06 | Stop reason: HOSPADM

## 2022-02-01 RX ORDER — LANOLIN ALCOHOL/MO/W.PET/CERES
400 CREAM (GRAM) TOPICAL 2 TIMES DAILY
Status: DISCONTINUED | OUTPATIENT
Start: 2022-02-01 | End: 2022-02-06 | Stop reason: HOSPADM

## 2022-02-01 RX ORDER — AMIODARONE HYDROCHLORIDE 200 MG/1
200 TABLET ORAL DAILY
Status: DISCONTINUED | OUTPATIENT
Start: 2022-02-02 | End: 2022-02-06 | Stop reason: HOSPADM

## 2022-02-01 RX ORDER — TAMSULOSIN HYDROCHLORIDE 0.4 MG/1
0.4 CAPSULE ORAL NIGHTLY
Status: DISCONTINUED | OUTPATIENT
Start: 2022-02-01 | End: 2022-02-06 | Stop reason: HOSPADM

## 2022-02-01 RX ORDER — HEPARIN SODIUM 5000 [USP'U]/ML
5000 INJECTION, SOLUTION INTRAVENOUS; SUBCUTANEOUS EVERY 8 HOURS
Status: DISCONTINUED | OUTPATIENT
Start: 2022-02-01 | End: 2022-02-01

## 2022-02-01 RX ORDER — IBUPROFEN 200 MG
24 TABLET ORAL
Status: DISCONTINUED | OUTPATIENT
Start: 2022-02-01 | End: 2022-02-06 | Stop reason: HOSPADM

## 2022-02-01 RX ORDER — HYDROCODONE BITARTRATE AND ACETAMINOPHEN 500; 5 MG/1; MG/1
TABLET ORAL
Status: DISCONTINUED | OUTPATIENT
Start: 2022-02-01 | End: 2022-02-06 | Stop reason: HOSPADM

## 2022-02-01 RX ORDER — SULFAMETHOXAZOLE AND TRIMETHOPRIM 400; 80 MG/1; MG/1
1 TABLET ORAL EVERY MORNING
Status: DISCONTINUED | OUTPATIENT
Start: 2022-02-02 | End: 2022-02-06 | Stop reason: HOSPADM

## 2022-02-01 RX ORDER — SODIUM CHLORIDE 0.9 % (FLUSH) 0.9 %
10 SYRINGE (ML) INJECTION
Status: DISCONTINUED | OUTPATIENT
Start: 2022-02-01 | End: 2022-02-06 | Stop reason: HOSPADM

## 2022-02-01 RX ORDER — TACROLIMUS 1 MG/1
2 CAPSULE ORAL 2 TIMES DAILY
Status: DISCONTINUED | OUTPATIENT
Start: 2022-02-01 | End: 2022-02-02

## 2022-02-01 RX ORDER — ALBUMIN HUMAN 50 G/1000ML
25 SOLUTION INTRAVENOUS ONCE
Status: DISCONTINUED | OUTPATIENT
Start: 2022-02-01 | End: 2022-02-01

## 2022-02-01 RX ORDER — IBUPROFEN 200 MG
16 TABLET ORAL
Status: DISCONTINUED | OUTPATIENT
Start: 2022-02-01 | End: 2022-02-06 | Stop reason: HOSPADM

## 2022-02-01 RX ADMIN — DOCUSATE SODIUM 50 MG: 50 CAPSULE, LIQUID FILLED ORAL at 09:02

## 2022-02-01 RX ADMIN — INSULIN DETEMIR 18 UNITS: 100 INJECTION, SOLUTION SUBCUTANEOUS at 09:02

## 2022-02-01 RX ADMIN — TACROLIMUS 2 MG: 1 CAPSULE ORAL at 06:02

## 2022-02-01 RX ADMIN — INSULIN ASPART 4 UNITS: 100 INJECTION, SOLUTION INTRAVENOUS; SUBCUTANEOUS at 06:02

## 2022-02-01 RX ADMIN — HEPARIN SODIUM 5000 UNITS: 5000 INJECTION, SOLUTION INTRAVENOUS; SUBCUTANEOUS at 03:02

## 2022-02-01 RX ADMIN — TAMSULOSIN HYDROCHLORIDE 0.4 MG: 0.4 CAPSULE ORAL at 09:02

## 2022-02-01 RX ADMIN — Medication 400 MG: at 09:02

## 2022-02-01 RX ADMIN — REMDESIVIR 200 MG: 100 INJECTION, POWDER, LYOPHILIZED, FOR SOLUTION INTRAVENOUS at 06:02

## 2022-02-01 NOTE — ASSESSMENT & PLAN NOTE
- patient unable to obtain labs because of weakness   - tested positive 1/26  - infusion 1/27 without complication   - ID consulted for treatment recs  - start remdesivir 2/1  - Sats 92-94% on RA, obtain CXR  - consider dexamethasone vs PO pred  - cellcept held

## 2022-02-01 NOTE — HPI
Mr. 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 Operation notable for replaced LHA, reconstructed. Post op course without complication. Patient with history of afib followed by cardiology who recommends continuing amiodarone with elliquis anticoagulation. 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 Patient to follow up with medical oncology for possible further treatment. Appointment scheduled for 1/26 cancelled due to testing positive for COVID19 1/26. He had an infusion for COVID on 1/27 without complication. Patient is being admitted for COVID treatment and monitoring. He was unable to make his lab appointments due to weakness. Patient reports generalized weakness, especially with activity, as well as decreased appetite, light headedness, and constipation. Denies cough, SOB, CP, palpitations, NVD, changes in urinary function. Due to recent transplant, infectious workup started on admission. Will treat COVID with remdesivir. Cellcept held for infection. Cardiac workup in process as well.

## 2022-02-01 NOTE — SUBJECTIVE & OBJECTIVE
Interval HPI:   Overnight events:  Admitted to COVID floor. No POCT glucose on file. Prednisone 5 mg daily ordered. Diet diabetic [facility];  Calorie  Eatin%  Nausea: No  Hypoglycemia and intervention: No  Fever: No  TPN and/or TF: No  If yes, type of TF/TPN and rate: N/A    PMH, PSH, FH, SH reviewed     ROS:  Constitutional: Positive for activity change, appetite decreased, and chills.  Eyes: Negative for visual disturbance.  Respiratory: Negative for cough.   Cardiovascular: Negative for chest pain.  Gastrointestinal: Negative for nausea.  Endocrine: Negative for polyuria, polydipsia.  Musculoskeletal: Negative for back pain.  Skin: Negative for rash.  Neurological: Positive for weakness.  Psychiatric/Behavioral: Negative for depression.    Review of Systems    Current Medications and/or Treatments Impacting Glycemic Control  Immunotherapy:    Immunosuppressants         Stop Route Frequency     tacrolimus capsule 2 mg         -- Oral 2 times daily        Steroids:   Hormones (From admission, onward)            Start     Stop Route Frequency Ordered    22 1448  melatonin tablet 6 mg         -- Oral Nightly PRN 22 1449        Pressors:    Autonomic Drugs (From admission, onward)            None        Hyperglycemia/Diabetes Medications:   Antihyperglycemics (From admission, onward)            Start     Stop Route Frequency Ordered    22 2100  insulin detemir U-100 pen 18 Units         -- SubQ Nightly 22 1449    22 1645  insulin aspart U-100 pen 4 Units         -- SubQ 3 times daily with meals 22 1449    22 1449  insulin aspart U-100 pen 0-5 Units         -- SubQ Before meals & nightly PRN 22 1449             PHYSICAL EXAMINATION:  There were no vitals filed for this visit.  Body mass index is 29.84 kg/m².    Physical Exam   Deferred to prevent risk/exposure of COVID-19.

## 2022-02-01 NOTE — ASSESSMENT & PLAN NOTE
- patient with covid 1/26   - missed labs due to weakness   - will treat covid, w/u for other potential infectious process, EKG for a fib  - labs and imaging pending

## 2022-02-01 NOTE — ASSESSMENT & PLAN NOTE
- 67 y/o man s/p DBD LTx on 1/6/21 for 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 Patient to follow up with medical oncology for possible further treatment. Appointment scheduled for 1/26 cancelled due to testing positive for COVID19 1/26.

## 2022-02-01 NOTE — HPI
Reason for Consult: Management of T2DM, Hyperglycemia     Surgical Procedure and Date: Liver Transplant 01/06/2022    Diabetes diagnosis year: 2000    Home Diabetes Medications:  Lantus 24 units q HS, Humalog 6 units TID with meals with correction scale    How often checking glucose at home?  4x daily    BG readings on regimen: 90-low 100s in the am and 200s throughout the afternoon  Hypoglycemia on the regimen?  No  Missed doses on regimen?  No    Diabetes Complications include:     Hyperglycemia and Diabetic peripheral neuropathy     Complicating diabetes co morbidities:   CIRRHOSIS and Active Cancer (skin), PAD, CASTILLO, HLD      HPI:   Patient is a 66 y.o. male with a diagnosis of s/p DBD LTx on 1/6/21 for ESLD 2/2 CASTILLO/HCC. Operation notable for replaced LHA, reconstructed. Post op course without complication. Patient with history of afib followed by cardiology who recommends continuing amiodarone with elliquis anticoagulation. Appointment scheduled for 1/26 cancelled due to testing positive for COVID19 1/26. He had an infusion for COVID on 1/27 without complication. Patient is being admitted for COVID treatment and monitoring. He was unable to make his lab appointments due to weakness. Patient reports generalized weakness, especially with activity, as well as decreased appetite, light headedness, and constipation. Denies cough, SOB, CP, palpitations, NVD, changes in urinary function. Due to recent transplant, infectious workup started on admission. Will treat COVID with remdesivir. Endocrinology consulted for management of T2DM.        Lab Results   Component Value Date    HGBA1C 5.8 (H) 01/05/2022

## 2022-02-01 NOTE — TELEPHONE ENCOUNTER
Spoke to patient's wife he could not get labs, at [facility] they refused him.  She is concerned with him feeling very weak.  Let Dr Zamarripa know he wants to admit.  Called admit, Dr Zamarripa to speak to Dr Lozada, I will let MOY know.

## 2022-02-01 NOTE — CONSULTS
[facility] - Tele[facility] Stepdown  Endocrinology  Diabetes Consult Note    Consult Requested by: Melvin Lozada MD   Reason for admit: COVID-19    HISTORY OF PRESENT ILLNESS:  Reason for Consult: Management of T2DM, Hyperglycemia     Surgical Procedure and Date: Liver Transplant 01/06/2022    Diabetes diagnosis year: 2000    Home Diabetes Medications:  Lantus 24 units q HS, Humalog 6 units TID with meals with correction scale    How often checking glucose at home?  4x daily    BG readings on regimen: 90-low 100s in the am and 200s throughout the afternoon  Hypoglycemia on the regimen?  No  Missed doses on regimen?  No    Diabetes Complications include:     Hyperglycemia and Diabetic peripheral neuropathy     Complicating diabetes co morbidities:   CIRRHOSIS and Active Cancer (skin), PAD, CASTILLO, HLD      HPI:   Patient is a 66 y.o. male with a diagnosis of s/p DBD LTx on 1/6/21 for ESLD 2/2 CASTILLO/HCC. Operation notable for replaced LHA, reconstructed. Post op course without complication. Patient with history of afib followed by cardiology who recommends continuing amiodarone with elliquis anticoagulation. Appointment scheduled for 1/26 cancelled due to testing positive for COVID19 1/26. He had an infusion for COVID on 1/27 without complication. Patient is being admitted for COVID treatment and monitoring. He was unable to make his lab appointments due to weakness. Patient reports generalized weakness, especially with activity, as well as decreased appetite, light headedness, and constipation. Denies cough, SOB, CP, palpitations, NVD, changes in urinary function. Due to recent transplant, infectious workup started on admission. Will treat COVID with remdesivir. Endocrinology consulted for management of T2DM.        Lab Results   Component Value Date    HGBA1C 5.8 (H) 01/05/2022           Interval HPI:   Overnight events:  Admitted to COVID floor. No POCT glucose on file. Prednisone 5 mg daily ordered. Diet diabetic [facility];  Calorie  Eatin%  Nausea: No  Hypoglycemia and intervention: No  Fever: No  TPN and/or TF: No  If yes, type of TF/TPN and rate: N/A    PMH, PSH, FH, SH reviewed     ROS:  Constitutional: Positive for activity change, appetite decreased, and chills.  Eyes: Negative for visual disturbance.  Respiratory: Negative for cough.   Cardiovascular: Negative for chest pain.  Gastrointestinal: Negative for nausea.  Endocrine: Negative for polyuria, polydipsia.  Musculoskeletal: Negative for back pain.  Skin: Negative for rash.  Neurological: Positive for weakness.  Psychiatric/Behavioral: Negative for depression.    Review of Systems    Current Medications and/or Treatments Impacting Glycemic Control  Immunotherapy:    Immunosuppressants         Stop Route Frequency     tacrolimus capsule 2 mg         -- Oral 2 times daily        Steroids:   Hormones (From admission, onward)            Start     Stop Route Frequency Ordered    22 1448  melatonin tablet 6 mg         -- Oral Nightly PRN 22 1449        Pressors:    Autonomic Drugs (From admission, onward)            None        Hyperglycemia/Diabetes Medications:   Antihyperglycemics (From admission, onward)            Start     Stop Route Frequency Ordered    22 2100  insulin detemir U-100 pen 18 Units         -- SubQ Nightly 22 1449    22 1645  insulin aspart U-100 pen 4 Units         -- SubQ 3 times daily with meals 22 1449    22 1449  insulin aspart U-100 pen 0-5 Units         -- SubQ Before meals & nightly PRN 22 1449             PHYSICAL EXAMINATION:  There were no vitals filed for this visit.  Body mass index is 29.84 kg/m².    Physical Exam   Deferred to prevent risk/exposure of COVID-19.       Labs Reviewed and Include   No results for input(s): GLU, CALCIUM, ALBUMIN, PROT, NA, K, CO2, CL, BUN, CREATININE, ALKPHOS, ALT, AST, BILITOT in the last 24 hours.  Lab Results   Component Value Date    WBC 7.14  01/24/2022    HGB 10.1 (L) 01/24/2022    HCT 31.8 (L) 01/24/2022    MCV 96 01/24/2022     01/24/2022     No results for input(s): TSH, FREET4 in the last 168 hours.  Lab Results   Component Value Date    HGBA1C 5.8 (H) 01/05/2022       Nutritional status:   Body mass index is 29.84 kg/m².  Lab Results   Component Value Date    ALBUMIN 3.2 (L) 01/24/2022    ALBUMIN 3.2 (L) 01/21/2022    ALBUMIN 3.3 (L) 01/18/2022     No results found for: PREALBUMIN    CrCl cannot be calculated (Patient's most recent lab result is older than the maximum 7 days allowed.).    Accu-Checks  No results for input(s): POCTGLUCOSE in the last 72 hours.     ASSESSMENT and PLAN    * COVID-19  May increase insulin resistance.         Type 2 diabetes mellitus with hyperglycemia  BG goal 140-180    Start Levemir 18 units q HS (30% dose reduction)  Start Novolog 4 units TID with meals (30% dose reduction)  Low Dose Correction Scale  BG monitoring ac/hs    ** Please call Endocrine for any BG related issues **    Discharge plans: TBD      Hyperlipidemia  May increase insulin resistance.         S/P liver transplant  Managed per primary team  Optimize BG control        Prophylactic immunotherapy  May increase insulin resistance.             Plan discussed with patient via phone do to HERRERAID isolationMarcus Lorenz NP  Endocrinology  [facility] - Tele[facility] Stepdown

## 2022-02-01 NOTE — H&P
[facility] - Tele[facility] Stepdown  Liver Transplant  History & Physical    Patient Name: Tej Purdy  MRN: [id number]  Admission Date: 2/1/2022  Code Status: Full Code  Primary Care Provider: Thais Maxwell MD  Post-Operative Day: 26     ORGAN:   LIVER  Disease Etiology: Primary Liver Malignancy: Hepatoma (HCC) and Cirrhosis  Donor Type:   Donation after Brain Death  CDC High Risk:   No  Donor CMV Status:   Donor CMV Status: Positive  Donor HBcAB:   Negative  Donor HCV Status:   Negative  Donor HBV JAYLIN: Negative  Donor HCV JAYLIN: Negative  Whole or Partial: Whole Liver  Biliary Anastomosis: End to End  Arterial Anatomy: Replaced Left Hepatic from Left Gastric    Subjective:     History of Present Illness:  Mr. 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 Operation notable for replaced LHA, reconstructed. Post op course without complication. Patient with history of afib followed by cardiology who recommends continuing amiodarone with elliquis anticoagulation. 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 Patient to follow up with medical oncology for possible further treatment. Appointment scheduled for 1/26 cancelled due to testing positive for COVID19 1/26. He had an infusion for COVID on 1/27 without complication. Patient is being admitted for COVID treatment and monitoring. He was unable to make his lab appointments due to weakness. Patient reports generalized weakness, especially with activity, as well as decreased appetite, light headedness, and constipation. Denies cough, SOB, CP, palpitations, NVD, changes in urinary function. Due to recent transplant, infectious workup started on admission. Will treat COVID with remdesivir. Cellcept held for infection. Cardiac workup in process as well.       Past Medical History:   Diagnosis Date    Atrial fibrillation     Cirrhosis 11/23/2020    Diabetes mellitus, type 2     Diabetic neuropathy 11/24/2020    Disorder of  kidney and ureter     HTN (hypertension) 11/23/2020    Hyperlipidemia 11/23/2020    Kidney stones 11/23/2020    S/p lithotripsy 2020    Leukocytosis 1/15/2021    Liver mass 11/23/2020    CASTILLO (nonalcoholic steatohepatitis) 11/23/2020    PAD (peripheral artery disease)     Portal hypertension 11/23/2020    Skin cancer 11/23/2020       Past Surgical History:   Procedure Laterality Date    COLONOSCOPY  10/2020    ESOPHAGOGASTRODUODENOSCOPY  10/2020    ESOPHAGOGASTRODUODENOSCOPY N/A 7/1/2021    Procedure: EGD (ESOPHAGOGASTRODUODENOSCOPY);  Surgeon: Jovan David MD;  Location: [facility] (4TH FLR);  Service: Endoscopy;  Laterality: N/A;  HCC. Listed for liver transplant. EGD for variceal surveillance.  cardiac clearance and blood thinenr approval received, see telephone encounter 6/23/21-BB  fully vaccinated-BB  labs same day of procedure-BB    EXCISION OF HYDROCELE Right     hemorroid surgery      hemorroidectomy      KIDNEY STONE SURGERY      LEFT HEART CATHETERIZATION N/A 1/27/2021    Procedure: Left heart cath;  Surgeon: Kris Shelton MD;  Location: [facility] CATH LAB;  Service: Cardiology;  Laterality: N/A;    liver mass removal      LIVER TRANSPLANT N/A 1/6/2022    Procedure: TRANSPLANT, LIVER;  Surgeon: Lizet Garcia MD;  Location: [facility] OR 2ND FLR;  Service: Transplant;  Laterality: N/A;    RIGHT HEART CATHETERIZATION Right 5/7/2021    Procedure: INSERTION, CATHETER, RIGHT HEART;  Surgeon: Jaden Schuster MD;  Location: [facility] CATH LAB;  Service: Cardiology;  Laterality: Right;    TREATMENT OF CARDIAC ARRHYTHMIA N/A 5/19/2021    Procedure: CARDIOVERSION;  Surgeon: Didier Tilley MD;  Location: [facility] EP LAB;  Service: Cardiology;  Laterality: N/A;  a fib, dccv/johny, mac, dm. sscu    TREATMENT OF CARDIAC ARRHYTHMIA N/A 5/31/2021    Procedure: CARDIOVERSION;  Surgeon: Daniel Arambula MD;  Location: [facility] EP LAB;  Service: Cardiology;  Laterality: N/A;  afib, DCCV, anest., DM, 3 prep     TREATMENT OF CARDIAC ARRHYTHMIA N/A 2021    Procedure: Cardioversion or Defibrillation;  Surgeon: Didier Tilley MD;  Location: [facility] EP LAB;  Service: Cardiology;  Laterality: N/A;  AF, ASHISH (cancel if complaint), DCCV, MAC, DM, 3 Prep    TREATMENT OF CARDIAC ARRHYTHMIA N/A 2021    Procedure: Cardioversion or Defibrillation;  Surgeon: Didier Tilley MD;  Location: [facility] EP LAB;  Service: Cardiology;  Laterality: N/A;  AF, ASHISH (cx if complaint), DCCV, MAC, DM, 3 Prep       Review of patient's allergies indicates:   Allergen Reactions    Bee pollens Swelling     BEE STINGS swells body       Family History    None       Tobacco Use    Smoking status: Former Smoker     Quit date: 1980     Years since quittin.0    Smokeless tobacco: Never Used   Substance and Sexual Activity    Alcohol use: Not Currently    Drug use: Never    Sexual activity: Not on file       Facility-Administered Medications Prior to Admission   Medication    acetaminophen tablet 650 mg    albuterol inhaler 2 puff    diphenhydrAMINE injection 25 mg    EPINEPHrine (EPIPEN) 0.3 mg/0.3 mL pen injection 0.3 mg    methylPREDNISolone sodium succinate injection 40 mg    ondansetron disintegrating tablet 4 mg    sodium chloride 0.9% 500 mL flush bag    sodium chloride 0.9% flush 10 mL     PTA Medications   Medication Sig    amiodarone (PACERONE) 200 MG Tab Take 1 tablet (200 mg total) by mouth once daily.    amLODIPine (NORVASC) 10 MG tablet Take 1 tablet (10 mg total) by mouth once daily.    apixaban (ELIQUIS) 5 mg Tab Take 1 tablet (5 mg total) by mouth 2 (two) times daily.    aspirin (ECOTRIN) 81 MG EC tablet Take 81 mg by mouth once daily.    blood sugar diagnostic Strp To check BG 2 times daily, to use with insurance preferred meter    blood-glucose meter kit To check BG 2 times daily, to use with insurance preferred meter    calcium carbonate (TUMS) 200 mg calcium (500 mg) chewable tablet Take 1 tablet (500 mg  "total) by mouth 3 (three) times daily as needed for Heartburn.    calcium carbonate-vitamin D3 600 mg-20 mcg (800 unit) Tab Take 1 tablet by mouth once daily.    gabapentin (NEURONTIN) 300 MG capsule Take 1 capsule (300 mg total) by mouth 3 (three) times daily as needed (neuropathy).    insulin (LANTUS SOLOSTAR U-100 INSULIN) glargine 100 units/mL (3mL) SubQ pen Inject 24 Units into the skin every evening.    insulin lispro (HUMALOG KWIKPEN INSULIN) 100 unit/mL pen Inject 6 Units into the skin 3 (three) times daily. Plus sliding scale, MDD: 48 units    lancets Misc To check BG 2 times daily, to use with insurance preferred meter    lovastatin (MEVACOR) 20 MG tablet     multivitamin capsule Take 1 capsule by mouth once daily.    mycophenolate (CELLCEPT) 250 mg Cap Take 4 capsules (1,000 mg total) by mouth 2 (two) times daily.    nystatin (MYCOSTATIN) 100,000 unit/mL suspension Take 5 mLs (500,000 Units total) by mouth 3 (three) times daily after meals. STOP 1/26/22    omega-3 fatty acids/fish oil (FISH OIL-OMEGA-3 FATTY ACIDS) 300-1,000 mg capsule Take 1 capsule by mouth once daily.     oxyCODONE (ROXICODONE) 10 mg Tab immediate release tablet Take 1 tablet (10 mg total) by mouth every 4 (four) hours as needed for Pain.    pantoprazole (PROTONIX) 40 MG tablet Take 1 tablet (40 mg total) by mouth once daily.    pen needle, diabetic (BD ULTRA-FINE GÉNESIS PEN NEEDLE) 32 gauge x 5/32" Ndle Use with insulin once daily    predniSONE (DELTASONE) 5 MG tablet Take by mouth daily:  20mg 1/12-1/18, 15mg 1/19-1/25, 10mg 1/26-2/1, 5mg 2/2-2/8, STOP 2/9/22    sertraline (ZOLOFT) 25 MG tablet Take 1 tablet (25 mg total) by mouth once daily.    sulfamethoxazole-trimethoprim 400-80mg (BACTRIM,SEPTRA) 400-80 mg per tablet Take 1 tablet by mouth every morning. STOP 7/7/22    tacrolimus (PROGRAF) 1 MG Cap Take 2 capsules (2 mg total) by mouth every 12 (twelve) hours.    tamsulosin (FLOMAX) 0.4 mg Cap Take 1 capsule (0.4 " mg total) by mouth every evening.    valGANciclovir (VALCYTE) 450 mg Tab Take 1 tablet (450 mg total) by mouth once daily. STOP 7/7/22       Review of Systems   Constitutional: Positive for activity change (low energy), appetite change (decreased) and chills. Negative for fatigue and fever.   HENT: Negative for congestion, sinus pressure and sore throat.    Respiratory: Negative for cough, chest tightness, shortness of breath and wheezing.    Cardiovascular: Negative for chest pain, palpitations and leg swelling.   Gastrointestinal: Positive for abdominal distention and constipation. Negative for abdominal pain, diarrhea, nausea and vomiting.   Genitourinary: Negative for decreased urine volume, difficulty urinating, dysuria and frequency.   Musculoskeletal: Negative for arthralgias and back pain.   Skin: Negative for wound.   Allergic/Immunologic: Positive for immunocompromised state.   Neurological: Positive for light-headedness. Negative for dizziness, syncope (+ for near syncope), weakness and headaches.   Hematological: Bruises/bleeds easily.     Objective:     Vital Signs (Most Recent):  Temp: 97.8 °F (36.6 °C) (02/01/22 1503)  Pulse: 87 (02/01/22 1503)  Resp: 17 (02/01/22 1503)  BP: (!) 119/56 (02/01/22 1503)  SpO2: (!) 93 % (02/01/22 1503) Vital Signs (24h Range):  Temp:  [97.8 °F (36.6 °C)] 97.8 °F (36.6 °C)  Pulse:  [87] 87  Resp:  [17] 17  SpO2:  [93 %] 93 %  BP: (119)/(56) 119/56     Weight: 99.8 kg (220 lb 0.3 oz)  Body mass index is 29.84 kg/m².    No intake or output data in the 24 hours ending 02/01/22 1534    Physical Exam  Vitals reviewed.   Constitutional:       General: He is not in acute distress.     Appearance: Normal appearance.   HENT:      Head: Normocephalic and atraumatic.      Nose: No congestion.      Mouth/Throat:      Mouth: Mucous membranes are moist.   Eyes:      General: No scleral icterus.     Extraocular Movements: Extraocular movements intact.      Conjunctiva/sclera:  Conjunctivae normal.      Pupils: Pupils are equal, round, and reactive to light.   Cardiovascular:      Rate and Rhythm: Normal rate and regular rhythm.      Pulses: Normal pulses.      Heart sounds: Murmur (systolic in nature) heard.       Pulmonary:      Effort: Pulmonary effort is normal. No respiratory distress.      Breath sounds: Normal breath sounds. No wheezing, rhonchi or rales.   Abdominal:      General: Bowel sounds are normal. There is no distension.      Palpations: Abdomen is soft.      Tenderness: There is no abdominal tenderness. There is no guarding.       Musculoskeletal:         General: Normal range of motion.      Cervical back: Normal range of motion and neck supple.   Skin:     General: Skin is warm and dry.      Findings: Bruising present.   Neurological:      General: No focal deficit present.      Mental Status: He is alert and oriented to person, place, and time. Mental status is at baseline.      Motor: No weakness.   Psychiatric:         Mood and Affect: Mood normal.         Behavior: Behavior normal.         Thought Content: Thought content normal.         Judgment: Judgment normal.         Laboratory:  Pending     Diagnostic Results:  Pending     Assessment/Plan:     * Weakness  - patient with covid 1/26   - missed labs due to weakness   - will treat covid, w/u for other potential infectious process, EKG for a fib  - labs and imaging pending       Long-term use of immunosuppressant medication  - see prophylactic immunotherapy       COVID-19  - patient unable to obtain labs because of weakness   - tested positive 1/26  - infusion 1/27 without complication   - ID consulted for treatment recs  - start remdesivir 2/1  - Sats 92-94% on RA, obtain CXR  - consider dexamethasone vs PO pred  - cellcept held       Anemia of chronic disease  - h/h stable    - continue to monitor with CBC     Prophylactic immunotherapy  - maintanence on prograf, cellcept, prednisone   - prograf levels monitored  daily for toxicity, therapeutic levels   - cellcept held for covid infection     At risk for opportunistic infections  - continue OI prophylaxis per protocol       S/P liver transplant  - 67 y/o man s/p DBD LTx on 1/6/21 for 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 Patient to follow up with medical oncology for possible further treatment. Appointment scheduled for 1/26 cancelled due to testing positive for COVID19 1/26.    Persistent atrial fibrillation  - continue home amiodarone   - continue home elliquis   - admit EKG pending     Type 2 diabetes mellitus  - monitor bg closely   - endocrine consulted, appreciate help    Diabetic neuropathy  - continuing home gabapentin      Hyperlipidemia  - continuing home asa and statin        Discharge Planning:  Not yet stable for dc    KYE SavageC  Liver Transplant  [facility] - Telemetry Stepdown

## 2022-02-01 NOTE — SUBJECTIVE & OBJECTIVE
Past Medical History:   Diagnosis Date    Atrial fibrillation     Cirrhosis 11/23/2020    Diabetes mellitus, type 2     Diabetic neuropathy 11/24/2020    Disorder of kidney and ureter     HTN (hypertension) 11/23/2020    Hyperlipidemia 11/23/2020    Kidney stones 11/23/2020    S/p lithotripsy 2020    Leukocytosis 1/15/2021    Liver mass 11/23/2020    CASTILLO (nonalcoholic steatohepatitis) 11/23/2020    PAD (peripheral artery disease)     Portal hypertension 11/23/2020    Skin cancer 11/23/2020       Past Surgical History:   Procedure Laterality Date    COLONOSCOPY  10/2020    ESOPHAGOGASTRODUODENOSCOPY  10/2020    ESOPHAGOGASTRODUODENOSCOPY N/A 7/1/2021    Procedure: EGD (ESOPHAGOGASTRODUODENOSCOPY);  Surgeon: Jovan David MD;  Location: [facility] (4TH FLR);  Service: Endoscopy;  Laterality: N/A;  HCC. Listed for liver transplant. EGD for variceal surveillance.  cardiac clearance and blood thinenr approval received, see telephone encounter 6/23/21-BB  fully vaccinated-BB  labs same day of procedure-BB    EXCISION OF HYDROCELE Right     hemorroid surgery      hemorroidectomy      KIDNEY STONE SURGERY      LEFT HEART CATHETERIZATION N/A 1/27/2021    Procedure: Left heart cath;  Surgeon: Kris Shelton MD;  Location: [facility] CATH LAB;  Service: Cardiology;  Laterality: N/A;    liver mass removal      LIVER TRANSPLANT N/A 1/6/2022    Procedure: TRANSPLANT, LIVER;  Surgeon: Lizet Garcia MD;  Location: [facility] OR 2ND FLR;  Service: Transplant;  Laterality: N/A;    RIGHT HEART CATHETERIZATION Right 5/7/2021    Procedure: INSERTION, CATHETER, RIGHT HEART;  Surgeon: Jaden Schuster MD;  Location: [facility] CATH LAB;  Service: Cardiology;  Laterality: Right;    TREATMENT OF CARDIAC ARRHYTHMIA N/A 5/19/2021    Procedure: CARDIOVERSION;  Surgeon: Didier Tilley MD;  Location: [facility] EP LAB;  Service: Cardiology;  Laterality: N/A;  a fib, dccv/johny, mac, dm. sscu    TREATMENT OF CARDIAC ARRHYTHMIA N/A  2021    Procedure: CARDIOVERSION;  Surgeon: Daniel Arambula MD;  Location: [facility] EP LAB;  Service: Cardiology;  Laterality: N/A;  afib, DCCV, anest., DM, 3 prep    TREATMENT OF CARDIAC ARRHYTHMIA N/A 2021    Procedure: Cardioversion or Defibrillation;  Surgeon: Didier Tilley MD;  Location: [facility] EP LAB;  Service: Cardiology;  Laterality: N/A;  AF, ASHISH (cancel if complaint), DCCV, MAC, DM, 3 Prep    TREATMENT OF CARDIAC ARRHYTHMIA N/A 2021    Procedure: Cardioversion or Defibrillation;  Surgeon: Didier Tilley MD;  Location: [facility] EP LAB;  Service: Cardiology;  Laterality: N/A;  AF, ASHISH (cx if complaint), DCCV, MAC, DM, 3 Prep       Review of patient's allergies indicates:   Allergen Reactions    Bee pollens Swelling     BEE STINGS swells body       Family History    None       Tobacco Use    Smoking status: Former Smoker     Quit date: 1980     Years since quittin.0    Smokeless tobacco: Never Used   Substance and Sexual Activity    Alcohol use: Not Currently    Drug use: Never    Sexual activity: Not on file       Facility-Administered Medications Prior to Admission   Medication    acetaminophen tablet 650 mg    albuterol inhaler 2 puff    diphenhydrAMINE injection 25 mg    EPINEPHrine (EPIPEN) 0.3 mg/0.3 mL pen injection 0.3 mg    methylPREDNISolone sodium succinate injection 40 mg    ondansetron disintegrating tablet 4 mg    sodium chloride 0.9% 500 mL flush bag    sodium chloride 0.9% flush 10 mL     PTA Medications   Medication Sig    amiodarone (PACERONE) 200 MG Tab Take 1 tablet (200 mg total) by mouth once daily.    amLODIPine (NORVASC) 10 MG tablet Take 1 tablet (10 mg total) by mouth once daily.    apixaban (ELIQUIS) 5 mg Tab Take 1 tablet (5 mg total) by mouth 2 (two) times daily.    aspirin (ECOTRIN) 81 MG EC tablet Take 81 mg by mouth once daily.    blood sugar diagnostic Strp To check BG 2 times daily, to use with insurance preferred meter     "blood-glucose meter kit To check BG 2 times daily, to use with insurance preferred meter    calcium carbonate (TUMS) 200 mg calcium (500 mg) chewable tablet Take 1 tablet (500 mg total) by mouth 3 (three) times daily as needed for Heartburn.    calcium carbonate-vitamin D3 600 mg-20 mcg (800 unit) Tab Take 1 tablet by mouth once daily.    gabapentin (NEURONTIN) 300 MG capsule Take 1 capsule (300 mg total) by mouth 3 (three) times daily as needed (neuropathy).    insulin (LANTUS SOLOSTAR U-100 INSULIN) glargine 100 units/mL (3mL) SubQ pen Inject 24 Units into the skin every evening.    insulin lispro (HUMALOG KWIKPEN INSULIN) 100 unit/mL pen Inject 6 Units into the skin 3 (three) times daily. Plus sliding scale, MDD: 48 units    lancets Misc To check BG 2 times daily, to use with insurance preferred meter    lovastatin (MEVACOR) 20 MG tablet     multivitamin capsule Take 1 capsule by mouth once daily.    mycophenolate (CELLCEPT) 250 mg Cap Take 4 capsules (1,000 mg total) by mouth 2 (two) times daily.    nystatin (MYCOSTATIN) 100,000 unit/mL suspension Take 5 mLs (500,000 Units total) by mouth 3 (three) times daily after meals. STOP 1/26/22    omega-3 fatty acids/fish oil (FISH OIL-OMEGA-3 FATTY ACIDS) 300-1,000 mg capsule Take 1 capsule by mouth once daily.     oxyCODONE (ROXICODONE) 10 mg Tab immediate release tablet Take 1 tablet (10 mg total) by mouth every 4 (four) hours as needed for Pain.    pantoprazole (PROTONIX) 40 MG tablet Take 1 tablet (40 mg total) by mouth once daily.    pen needle, diabetic (BD ULTRA-FINE GÉNESIS PEN NEEDLE) 32 gauge x 5/32" Ndle Use with insulin once daily    predniSONE (DELTASONE) 5 MG tablet Take by mouth daily:  20mg 1/12-1/18, 15mg 1/19-1/25, 10mg 1/26-2/1, 5mg 2/2-2/8, STOP 2/9/22    sertraline (ZOLOFT) 25 MG tablet Take 1 tablet (25 mg total) by mouth once daily.    sulfamethoxazole-trimethoprim 400-80mg (BACTRIM,SEPTRA) 400-80 mg per tablet Take 1 tablet by mouth " every morning. STOP 7/7/22    tacrolimus (PROGRAF) 1 MG Cap Take 2 capsules (2 mg total) by mouth every 12 (twelve) hours.    tamsulosin (FLOMAX) 0.4 mg Cap Take 1 capsule (0.4 mg total) by mouth every evening.    valGANciclovir (VALCYTE) 450 mg Tab Take 1 tablet (450 mg total) by mouth once daily. STOP 7/7/22       Review of Systems   Constitutional: Positive for activity change (low energy), appetite change (decreased) and chills. Negative for fatigue and fever.   HENT: Negative for congestion, sinus pressure and sore throat.    Respiratory: Negative for cough, chest tightness, shortness of breath and wheezing.    Cardiovascular: Negative for chest pain, palpitations and leg swelling.   Gastrointestinal: Positive for abdominal distention and constipation. Negative for abdominal pain, diarrhea, nausea and vomiting.   Genitourinary: Negative for decreased urine volume, difficulty urinating, dysuria and frequency.   Musculoskeletal: Negative for arthralgias and back pain.   Skin: Negative for wound.   Allergic/Immunologic: Positive for immunocompromised state.   Neurological: Positive for light-headedness. Negative for dizziness, syncope (+ for near syncope), weakness and headaches.   Hematological: Bruises/bleeds easily.     Objective:     Vital Signs (Most Recent):  Temp: 97.8 °F (36.6 °C) (02/01/22 1503)  Pulse: 87 (02/01/22 1503)  Resp: 17 (02/01/22 1503)  BP: (!) 119/56 (02/01/22 1503)  SpO2: (!) 93 % (02/01/22 1503) Vital Signs (24h Range):  Temp:  [97.8 °F (36.6 °C)] 97.8 °F (36.6 °C)  Pulse:  [87] 87  Resp:  [17] 17  SpO2:  [93 %] 93 %  BP: (119)/(56) 119/56     Weight: 99.8 kg (220 lb 0.3 oz)  Body mass index is 29.84 kg/m².    No intake or output data in the 24 hours ending 02/01/22 1534    Physical Exam  Vitals reviewed.   Constitutional:       General: He is not in acute distress.     Appearance: Normal appearance.   HENT:      Head: Normocephalic and atraumatic.      Nose: No congestion.       Mouth/Throat:      Mouth: Mucous membranes are moist.   Eyes:      General: No scleral icterus.     Extraocular Movements: Extraocular movements intact.      Conjunctiva/sclera: Conjunctivae normal.      Pupils: Pupils are equal, round, and reactive to light.   Cardiovascular:      Rate and Rhythm: Normal rate and regular rhythm.      Pulses: Normal pulses.      Heart sounds: Murmur (systolic in nature) heard.       Pulmonary:      Effort: Pulmonary effort is normal. No respiratory distress.      Breath sounds: Normal breath sounds. No wheezing, rhonchi or rales.   Abdominal:      General: Bowel sounds are normal. There is no distension.      Palpations: Abdomen is soft.      Tenderness: There is no abdominal tenderness. There is no guarding.       Musculoskeletal:         General: Normal range of motion.      Cervical back: Normal range of motion and neck supple.   Skin:     General: Skin is warm and dry.      Findings: Bruising present.   Neurological:      General: No focal deficit present.      Mental Status: He is alert and oriented to person, place, and time. Mental status is at baseline.      Motor: No weakness.   Psychiatric:         Mood and Affect: Mood normal.         Behavior: Behavior normal.         Thought Content: Thought content normal.         Judgment: Judgment normal.         Laboratory:  Pending     Diagnostic Results:  Pending

## 2022-02-01 NOTE — ASSESSMENT & PLAN NOTE
BG goal 140-180    Start Levemir 18 units q HS (30% dose reduction)  Start Novolog 4 units TID with meals (30% dose reduction)  Low Dose Correction Scale  BG monitoring ac/hs    ** Please call Endocrine for any BG related issues **    Discharge plans: TBD

## 2022-02-01 NOTE — ASSESSMENT & PLAN NOTE
- maintanence on prograf, cellcept, prednisone   - prograf levels monitored daily for toxicity, therapeutic levels   - cellcept held for covid infection

## 2022-02-02 PROBLEM — D64.9 ANEMIA DUE TO UNKNOWN MECHANISM: Status: ACTIVE | Noted: 2022-02-02

## 2022-02-02 LAB
ALBUMIN SERPL BCP-MCNC: 2.8 G/DL (ref 3.5–5.2)
ALP SERPL-CCNC: 45 U/L (ref 55–135)
ALT SERPL W/O P-5'-P-CCNC: 8 U/L (ref 10–44)
ANION GAP SERPL CALC-SCNC: 6 MMOL/L (ref 8–16)
AST SERPL-CCNC: 11 U/L (ref 10–40)
BACTERIA #/AREA URNS AUTO: ABNORMAL /HPF
BASOPHILS # BLD AUTO: 0.02 K/UL (ref 0–0.2)
BASOPHILS # BLD AUTO: 0.03 K/UL (ref 0–0.2)
BASOPHILS # BLD AUTO: 0.03 K/UL (ref 0–0.2)
BASOPHILS NFR BLD: 0.3 % (ref 0–1.9)
BASOPHILS NFR BLD: 0.5 % (ref 0–1.9)
BASOPHILS NFR BLD: 0.6 % (ref 0–1.9)
BILIRUB SERPL-MCNC: 0.7 MG/DL (ref 0.1–1)
BILIRUB UR QL STRIP: NEGATIVE
BLD PROD TYP BPU: NORMAL
BLOOD UNIT EXPIRATION DATE: NORMAL
BLOOD UNIT TYPE CODE: 9500
BLOOD UNIT TYPE: NORMAL
BUN SERPL-MCNC: 35 MG/DL (ref 8–23)
CALCIUM SERPL-MCNC: 9 MG/DL (ref 8.7–10.5)
CHLORIDE SERPL-SCNC: 97 MMOL/L (ref 95–110)
CLARITY UR REFRACT.AUTO: CLEAR
CO2 SERPL-SCNC: 28 MMOL/L (ref 23–29)
CODING SYSTEM: NORMAL
COLOR UR AUTO: YELLOW
CREAT SERPL-MCNC: 1.7 MG/DL (ref 0.5–1.4)
DIFFERENTIAL METHOD: ABNORMAL
DISPENSE STATUS: NORMAL
EOSINOPHIL # BLD AUTO: 0 K/UL (ref 0–0.5)
EOSINOPHIL # BLD AUTO: 0.1 K/UL (ref 0–0.5)
EOSINOPHIL # BLD AUTO: 0.1 K/UL (ref 0–0.5)
EOSINOPHIL NFR BLD: 0.3 % (ref 0–8)
EOSINOPHIL NFR BLD: 1.1 % (ref 0–8)
EOSINOPHIL NFR BLD: 1.1 % (ref 0–8)
ERYTHROCYTE [DISTWIDTH] IN BLOOD BY AUTOMATED COUNT: 15.3 % (ref 11.5–14.5)
ERYTHROCYTE [DISTWIDTH] IN BLOOD BY AUTOMATED COUNT: 16.1 % (ref 11.5–14.5)
ERYTHROCYTE [DISTWIDTH] IN BLOOD BY AUTOMATED COUNT: 16.3 % (ref 11.5–14.5)
EST. GFR  (AFRICAN AMERICAN): 46.9 ML/MIN/1.73 M^2
EST. GFR  (NON AFRICAN AMERICAN): 40.5 ML/MIN/1.73 M^2
GLUCOSE SERPL-MCNC: 176 MG/DL (ref 70–110)
GLUCOSE UR QL STRIP: NEGATIVE
HCT VFR BLD AUTO: 19.6 % (ref 40–54)
HCT VFR BLD AUTO: 19.7 % (ref 40–54)
HCT VFR BLD AUTO: 25.6 % (ref 40–54)
HCT VFR BLD AUTO: 25.7 % (ref 40–54)
HGB BLD-MCNC: 6 G/DL (ref 14–18)
HGB BLD-MCNC: 6 G/DL (ref 14–18)
HGB BLD-MCNC: 8 G/DL (ref 14–18)
HGB BLD-MCNC: 8.3 G/DL (ref 14–18)
HGB UR QL STRIP: NEGATIVE
HYALINE CASTS UR QL AUTO: 7 /LPF
IMM GRANULOCYTES # BLD AUTO: 0.11 K/UL (ref 0–0.04)
IMM GRANULOCYTES # BLD AUTO: 0.12 K/UL (ref 0–0.04)
IMM GRANULOCYTES # BLD AUTO: 0.13 K/UL (ref 0–0.04)
IMM GRANULOCYTES NFR BLD AUTO: 1.9 % (ref 0–0.5)
IMM GRANULOCYTES NFR BLD AUTO: 2 % (ref 0–0.5)
IMM GRANULOCYTES NFR BLD AUTO: 2 % (ref 0–0.5)
KETONES UR QL STRIP: NEGATIVE
LEUKOCYTE ESTERASE UR QL STRIP: NEGATIVE
LYMPHOCYTES # BLD AUTO: 0.2 K/UL (ref 1–4.8)
LYMPHOCYTES # BLD AUTO: 0.6 K/UL (ref 1–4.8)
LYMPHOCYTES # BLD AUTO: 0.6 K/UL (ref 1–4.8)
LYMPHOCYTES NFR BLD: 11.6 % (ref 18–48)
LYMPHOCYTES NFR BLD: 3.8 % (ref 18–48)
LYMPHOCYTES NFR BLD: 8.5 % (ref 18–48)
MAGNESIUM SERPL-MCNC: 1.4 MG/DL (ref 1.6–2.6)
MCH RBC QN AUTO: 29.2 PG (ref 27–31)
MCH RBC QN AUTO: 29.6 PG (ref 27–31)
MCH RBC QN AUTO: 30.3 PG (ref 27–31)
MCHC RBC AUTO-ENTMCNC: 30.6 G/DL (ref 32–36)
MCHC RBC AUTO-ENTMCNC: 31.1 G/DL (ref 32–36)
MCHC RBC AUTO-ENTMCNC: 32.4 G/DL (ref 32–36)
MCV RBC AUTO: 93 FL (ref 82–98)
MCV RBC AUTO: 94 FL (ref 82–98)
MCV RBC AUTO: 97 FL (ref 82–98)
MICROSCOPIC COMMENT: ABNORMAL
MONOCYTES # BLD AUTO: 0.5 K/UL (ref 0.3–1)
MONOCYTES NFR BLD: 7.9 % (ref 4–15)
MONOCYTES NFR BLD: 8.1 % (ref 4–15)
MONOCYTES NFR BLD: 8.8 % (ref 4–15)
NEUTROPHILS # BLD AUTO: 4.1 K/UL (ref 1.8–7.7)
NEUTROPHILS # BLD AUTO: 5.1 K/UL (ref 1.8–7.7)
NEUTROPHILS # BLD AUTO: 5.4 K/UL (ref 1.8–7.7)
NEUTROPHILS NFR BLD: 75.9 % (ref 38–73)
NEUTROPHILS NFR BLD: 79.8 % (ref 38–73)
NEUTROPHILS NFR BLD: 85.8 % (ref 38–73)
NITRITE UR QL STRIP: NEGATIVE
NRBC BLD-RTO: 1 /100 WBC
NRBC BLD-RTO: 1 /100 WBC
NRBC BLD-RTO: 2 /100 WBC
PH UR STRIP: 5 [PH] (ref 5–8)
PLATELET # BLD AUTO: 197 K/UL (ref 150–450)
PLATELET # BLD AUTO: 204 K/UL (ref 150–450)
PLATELET # BLD AUTO: 222 K/UL (ref 150–450)
PMV BLD AUTO: 10 FL (ref 9.2–12.9)
PMV BLD AUTO: 10.1 FL (ref 9.2–12.9)
PMV BLD AUTO: 9.9 FL (ref 9.2–12.9)
POCT GLUCOSE: 214 MG/DL (ref 70–110)
POCT GLUCOSE: 235 MG/DL (ref 70–110)
POCT GLUCOSE: 242 MG/DL (ref 70–110)
POCT GLUCOSE: 255 MG/DL (ref 70–110)
POCT GLUCOSE: 274 MG/DL (ref 70–110)
POTASSIUM SERPL-SCNC: 5 MMOL/L (ref 3.5–5.1)
PROT SERPL-MCNC: 5.1 G/DL (ref 6–8.4)
PROT UR QL STRIP: NEGATIVE
RBC # BLD AUTO: 2.03 M/UL (ref 4.6–6.2)
RBC # BLD AUTO: 2.74 M/UL (ref 4.6–6.2)
RBC # BLD AUTO: 2.74 M/UL (ref 4.6–6.2)
RBC #/AREA URNS AUTO: 2 /HPF (ref 0–4)
SODIUM SERPL-SCNC: 131 MMOL/L (ref 136–145)
SP GR UR STRIP: 1.02 (ref 1–1.03)
TACROLIMUS BLD-MCNC: 9.1 NG/ML (ref 5–15)
TRANS ERYTHROCYTES VOL PATIENT: NORMAL ML
URN SPEC COLLECT METH UR: NORMAL
WBC # BLD AUTO: 5.44 K/UL (ref 3.9–12.7)
WBC # BLD AUTO: 6.24 K/UL (ref 3.9–12.7)
WBC # BLD AUTO: 6.44 K/UL (ref 3.9–12.7)
WBC #/AREA URNS AUTO: 2 /HPF (ref 0–5)

## 2022-02-02 PROCEDURE — 25000003 PHARM REV CODE 250: Performed by: NURSE PRACTITIONER

## 2022-02-02 PROCEDURE — 99232 PR SUBSEQUENT HOSPITAL CARE,LEVL II: ICD-10-PCS | Mod: ,,, | Performed by: NURSE PRACTITIONER

## 2022-02-02 PROCEDURE — 99223 1ST HOSP IP/OBS HIGH 75: CPT | Mod: ,,, | Performed by: INTERNAL MEDICINE

## 2022-02-02 PROCEDURE — 83735 ASSAY OF MAGNESIUM: CPT

## 2022-02-02 PROCEDURE — 25000003 PHARM REV CODE 250: Performed by: PHYSICIAN ASSISTANT

## 2022-02-02 PROCEDURE — 80197 ASSAY OF TACROLIMUS: CPT

## 2022-02-02 PROCEDURE — 27000207 HC ISOLATION

## 2022-02-02 PROCEDURE — 80053 COMPREHEN METABOLIC PANEL: CPT

## 2022-02-02 PROCEDURE — 36415 COLL VENOUS BLD VENIPUNCTURE: CPT | Performed by: PHYSICIAN ASSISTANT

## 2022-02-02 PROCEDURE — P9021 RED BLOOD CELLS UNIT: HCPCS | Performed by: PHYSICIAN ASSISTANT

## 2022-02-02 PROCEDURE — 85014 HEMATOCRIT: CPT | Performed by: PHYSICIAN ASSISTANT

## 2022-02-02 PROCEDURE — 81001 URINALYSIS AUTO W/SCOPE: CPT

## 2022-02-02 PROCEDURE — 99223 PR INITIAL HOSPITAL CARE,LEVL III: ICD-10-PCS | Mod: ,,, | Performed by: INTERNAL MEDICINE

## 2022-02-02 PROCEDURE — 99233 SBSQ HOSP IP/OBS HIGH 50: CPT | Mod: ,,,

## 2022-02-02 PROCEDURE — 25000003 PHARM REV CODE 250

## 2022-02-02 PROCEDURE — 85025 COMPLETE CBC W/AUTO DIFF WBC: CPT

## 2022-02-02 PROCEDURE — 99233 PR SUBSEQUENT HOSPITAL CARE,LEVL III: ICD-10-PCS | Mod: ,,,

## 2022-02-02 PROCEDURE — 63600175 PHARM REV CODE 636 W HCPCS: Performed by: PHYSICIAN ASSISTANT

## 2022-02-02 PROCEDURE — 20600001 HC STEP DOWN PRIVATE ROOM

## 2022-02-02 PROCEDURE — 63600175 PHARM REV CODE 636 W HCPCS

## 2022-02-02 PROCEDURE — 99232 SBSQ HOSP IP/OBS MODERATE 35: CPT | Mod: ,,, | Performed by: NURSE PRACTITIONER

## 2022-02-02 PROCEDURE — 85018 HEMOGLOBIN: CPT | Performed by: PHYSICIAN ASSISTANT

## 2022-02-02 PROCEDURE — 85025 COMPLETE CBC W/AUTO DIFF WBC: CPT | Mod: 91 | Performed by: PHYSICIAN ASSISTANT

## 2022-02-02 PROCEDURE — 82272 OCCULT BLD FECES 1-3 TESTS: CPT

## 2022-02-02 RX ORDER — BISACODYL 10 MG
10 SUPPOSITORY, RECTAL RECTAL DAILY PRN
Status: DISCONTINUED | OUTPATIENT
Start: 2022-02-02 | End: 2022-02-02

## 2022-02-02 RX ORDER — POLYETHYLENE GLYCOL 3350 17 G/17G
17 POWDER, FOR SOLUTION ORAL 2 TIMES DAILY PRN
Status: DISCONTINUED | OUTPATIENT
Start: 2022-02-02 | End: 2022-02-02

## 2022-02-02 RX ORDER — INSULIN ASPART 100 [IU]/ML
6 INJECTION, SOLUTION INTRAVENOUS; SUBCUTANEOUS
Status: DISCONTINUED | OUTPATIENT
Start: 2022-02-02 | End: 2022-02-06 | Stop reason: HOSPADM

## 2022-02-02 RX ORDER — TACROLIMUS 1 MG/1
2 CAPSULE ORAL EVERY MORNING
Status: DISCONTINUED | OUTPATIENT
Start: 2022-02-03 | End: 2022-02-03

## 2022-02-02 RX ORDER — BISACODYL 10 MG
10 SUPPOSITORY, RECTAL RECTAL DAILY
Status: DISCONTINUED | OUTPATIENT
Start: 2022-02-02 | End: 2022-02-04

## 2022-02-02 RX ORDER — POLYETHYLENE GLYCOL 3350 17 G/17G
17 POWDER, FOR SOLUTION ORAL 2 TIMES DAILY
Status: DISCONTINUED | OUTPATIENT
Start: 2022-02-02 | End: 2022-02-05

## 2022-02-02 RX ORDER — MAGNESIUM SULFATE HEPTAHYDRATE 40 MG/ML
2 INJECTION, SOLUTION INTRAVENOUS ONCE
Status: CANCELLED | OUTPATIENT
Start: 2022-02-02 | End: 2022-02-02

## 2022-02-02 RX ORDER — HYDROCODONE BITARTRATE AND ACETAMINOPHEN 500; 5 MG/1; MG/1
TABLET ORAL
Status: DISCONTINUED | OUTPATIENT
Start: 2022-02-02 | End: 2022-02-06 | Stop reason: HOSPADM

## 2022-02-02 RX ORDER — TACROLIMUS 1 MG/1
1 CAPSULE ORAL EVERY EVENING
Status: DISCONTINUED | OUTPATIENT
Start: 2022-02-02 | End: 2022-02-03

## 2022-02-02 RX ORDER — MAGNESIUM SULFATE HEPTAHYDRATE 40 MG/ML
2 INJECTION, SOLUTION INTRAVENOUS ONCE
Status: COMPLETED | OUTPATIENT
Start: 2022-02-02 | End: 2022-02-02

## 2022-02-02 RX ADMIN — AMIODARONE HYDROCHLORIDE 200 MG: 200 TABLET ORAL at 09:02

## 2022-02-02 RX ADMIN — VALGANCICLOVIR 450 MG: 450 TABLET, FILM COATED ORAL at 09:02

## 2022-02-02 RX ADMIN — Medication 400 MG: at 09:02

## 2022-02-02 RX ADMIN — SERTRALINE HYDROCHLORIDE 25 MG: 25 TABLET ORAL at 09:02

## 2022-02-02 RX ADMIN — SULFAMETHOXAZOLE AND TRIMETHOPRIM 1 TABLET: 400; 80 TABLET ORAL at 09:02

## 2022-02-02 RX ADMIN — BISACODYL 10 MG: 5 TABLET, COATED ORAL at 03:02

## 2022-02-02 RX ADMIN — PANTOPRAZOLE SODIUM 40 MG: 40 TABLET, DELAYED RELEASE ORAL at 09:02

## 2022-02-02 RX ADMIN — DOCUSATE SODIUM 50 MG: 50 CAPSULE, LIQUID FILLED ORAL at 09:02

## 2022-02-02 RX ADMIN — PRAVASTATIN SODIUM 20 MG: 20 TABLET ORAL at 09:02

## 2022-02-02 RX ADMIN — TAMSULOSIN HYDROCHLORIDE 0.4 MG: 0.4 CAPSULE ORAL at 08:02

## 2022-02-02 RX ADMIN — AMLODIPINE BESYLATE 10 MG: 10 TABLET ORAL at 09:02

## 2022-02-02 RX ADMIN — DOCUSATE SODIUM 50 MG: 50 CAPSULE, LIQUID FILLED ORAL at 03:02

## 2022-02-02 RX ADMIN — INSULIN ASPART 4 UNITS: 100 INJECTION, SOLUTION INTRAVENOUS; SUBCUTANEOUS at 09:02

## 2022-02-02 RX ADMIN — REMDESIVIR 100 MG: 100 INJECTION, POWDER, LYOPHILIZED, FOR SOLUTION INTRAVENOUS at 09:02

## 2022-02-02 RX ADMIN — INSULIN ASPART 2 UNITS: 100 INJECTION, SOLUTION INTRAVENOUS; SUBCUTANEOUS at 06:02

## 2022-02-02 RX ADMIN — DOCUSATE SODIUM 50 MG: 50 CAPSULE, LIQUID FILLED ORAL at 08:02

## 2022-02-02 RX ADMIN — Medication 400 MG: at 08:02

## 2022-02-02 RX ADMIN — TACROLIMUS 1 MG: 1 CAPSULE ORAL at 06:02

## 2022-02-02 RX ADMIN — INSULIN ASPART 2 UNITS: 100 INJECTION, SOLUTION INTRAVENOUS; SUBCUTANEOUS at 09:02

## 2022-02-02 RX ADMIN — PREDNISONE 5 MG: 5 TABLET ORAL at 09:02

## 2022-02-02 RX ADMIN — TACROLIMUS 2 MG: 1 CAPSULE ORAL at 09:02

## 2022-02-02 RX ADMIN — MAGNESIUM SULFATE 2 G: 2 INJECTION INTRAVENOUS at 09:02

## 2022-02-02 RX ADMIN — INSULIN ASPART 6 UNITS: 100 INJECTION, SOLUTION INTRAVENOUS; SUBCUTANEOUS at 06:02

## 2022-02-02 NOTE — PLAN OF CARE
[facility] - Telemetry Stepdown  Initial Discharge Assessment       Primary Care Provider: Thais Maxwell MD    Admission Diagnosis: COVID-19 [U07.1]    Admission Date: 2/1/2022  Expected Discharge Date:          Payor: [employer] / Plan: [employer] / Product Type: Medicare Advantage /     Extended Emergency Contact Information  Primary Emergency Contact: Aniya Berkowitz  Mobile Phone: [phone]  Relation: Significant other              [facility]  [address]  [facility] [address]  Phone: [phone] Fax: [phone]    [id number] - [place], [place] - [address]  [address]  [place] [address]  Phone: [phone] Fax: [phone]    [facility]  [address]  [facility] [address]  Phone: [phone] Fax: [phone]      Initial Assessment (most recent)     Adult Discharge Assessment - 02/02/22 0740        Discharge Assessment    Assessment Type Discharge Planning Assessment                 Pt will be followed by transplant CM/SW/UR.

## 2022-02-02 NOTE — ASSESSMENT & PLAN NOTE
- patient unable to obtain labs because of weakness   - tested positive 1/26  - infusion 1/27 without complication   - ID consulted for treatment recs - started remdesivir 2/1, continuing home PO pred   - Sats 92-94% on RA, CXR without evidence of cardiopulmonary disease  - cellcept held

## 2022-02-02 NOTE — PLAN OF CARE
Problem: Adult Inpatient Plan of Care  Goal: Plan of Care Review  Outcome: Ongoing, Progressing  Flowsheets (Taken 2/2/2022 1657)  Plan of Care Reviewed With: patient     Problem: Diabetes Comorbidity  Goal: Blood Glucose Level Within Targeted Range  Outcome: Ongoing, Progressing  Intervention: Monitor and Manage Glycemia  Flowsheets (Taken 2/2/2022 1657)  Glycemic Management:   blood glucose monitored   supplemental insulin given     Safety maintained. VSS. Pt ambulated. No complaints of pain. 1 unit of PRBC given per order .H/H improved

## 2022-02-02 NOTE — ASSESSMENT & PLAN NOTE
66 y.o. male with PMH CASTILLO Cirrhosis c/b HE and HCC s/p TACE and RFA in 12/2020, s/p DBDLT on 1/6/2022 (intra-op with replaced LHA reconstructed; CMV D+/R-, on [place]/MMF/Pred), recent COVID-19 positive on 1/26 s/p sotrovimab on 1/27, T2DM, HTN, Afib, HLD; admitted on 2/1/2022 with progressive weakness and fatigue for 1 week.  Work up with new decreased Hgb (from 10 on 1/24 to 5 on 2/1), elevated retic 10. CT ab/pelvis with moderate ascites and R pleural effusion. Unknown source of blood loss, but not likely DIC/hemolysis (no schistocyte, normal LDH/haptoglobin). ID consulted for COVID-19 management.    Recommendations:  - Continue remdesivir (day 2) per protocol. Given recent sotrovimab, patient does not have to stay inpatient to complete full remdesivir course.   - Follow up CMV.  - Agreed with IR paracentesis. Will follow up results.

## 2022-02-02 NOTE — CONSULTS
[facility] - Telemetry Stepdown  Infectious Diseases  Consult Note    Patient Name: Tej Purdy  MRN: [id number]  Admission Date: 2/1/2022  Hospital Length of Stay: 1 days  Attending Physician: Melvin Lozada MD  Primary Care Provider: Thais Maxwell MD     Isolation Status: Airborne and Contact and Droplet    Patient information was obtained from patient, past medical records and ER records.      Inpatient consult to Infectious Diseases  Consult performed by: Yosef Amezquita MD  Consult ordered by: Blanche Parker PA-C        Assessment/Plan:     COVID-19  66 y.o. male with PMH CASTILLO Cirrhosis c/b HE and HCC s/p TACE and RFA in 12/2020, s/p DBDLT on 1/6/2022 (intra-op with replaced LHA reconstructed; CMV D+/R-, on [place]/MMF/Pred), recent COVID-19 positive on 1/26 s/p sotrovimab on 1/27, T2DM, HTN, Afib, HLD; admitted on 2/1/2022 with progressive weakness and fatigue for 1 week.  Work up with new decreased Hgb (from 10 on 1/24 to 5 on 2/1), elevated retic 10. CT ab/pelvis with moderate ascites and R pleural effusion. Unknown source of blood loss, but not likely DIC/hemolysis (no schistocyte, normal LDH/haptoglobin). ID consulted for COVID-19 management.    Recommendations:  - Continue remdesivir (day 2) per protocol. Given recent sotrovimab, patient does not have to stay inpatient to complete full remdesivir course.   - Follow up CMV.  - Agreed with IR paracentesis. Will follow up results.         Thank you for your consult. I will follow-up with patient. Please contact us if you have any additional questions.    Yosef Amezquita MD  Infectious Diseases  [facility] - Telemetry Stepdown    Subjective:     Principal Problem: Anemia due to unknown mechanism    HPI: Mr. Tej Purdy is a 66 y.o.  male with PMH CASTILLO Cirrhosis c/b HE and HCC s/p TACE and RFA in 12/2020, s/p DBDLT (intra-op with replaced LHA reconstructed; CMV D+/R-, on [place]/MMF/Pred), recent COVID-19 positive on 1/26 s/p sotrovimab on 1/27,  "T2DM, HTN, Afib, HLD; admitted on 2/1/2022 with progressive weakness and fatigue for 1 week. He reports abdominal distension "swelling" and constipation, but otherwise no fever/chills, N/V/D, dysuria, cough/SOB, chest pain. He reports chronic abdominal pain at transplant/surgery site, but no change in intensity/frequency of pain. Work up with new decreased in Hgb (from 10 on 1/24 to 5 on 2/1), elevated retic 10. CT ab/pelvis with moderate ascites and R pleural effusion. IR consulted for paracentesis. Our service is consulted for recommendations on COVID-19.          Past Medical History:   Diagnosis Date    Atrial fibrillation     Cirrhosis 11/23/2020    Diabetes mellitus, type 2     Diabetic neuropathy 11/24/2020    Disorder of kidney and ureter     HTN (hypertension) 11/23/2020    Hyperlipidemia 11/23/2020    Kidney stones 11/23/2020    S/p lithotripsy 2020    Leukocytosis 1/15/2021    Liver mass 11/23/2020    CASTILLO (nonalcoholic steatohepatitis) 11/23/2020    PAD (peripheral artery disease)     Portal hypertension 11/23/2020    Skin cancer 11/23/2020       Past Surgical History:   Procedure Laterality Date    COLONOSCOPY  10/2020    ESOPHAGOGASTRODUODENOSCOPY  10/2020    ESOPHAGOGASTRODUODENOSCOPY N/A 7/1/2021    Procedure: EGD (ESOPHAGOGASTRODUODENOSCOPY);  Surgeon: Jovan David MD;  Location: [facility] ([address] [place] [address]);  Service: Endoscopy;  Laterality: N/A;  HCC. Listed for liver transplant. EGD for variceal surveillance.  cardiac clearance and blood thinenr approval received, see telephone encounter 6/23/21-BB  fully vaccinated-BB  labs same day of procedure-BB    EXCISION OF HYDROCELE Right     hemorroid surgery      hemorroidectomy      KIDNEY STONE SURGERY      LEFT HEART CATHETERIZATION N/A 1/27/2021    Procedure: Left heart cath;  Surgeon: Kris Shelton MD;  Location: [facility] CATH LAB;  Service: Cardiology;  Laterality: N/A;    liver mass removal      LIVER TRANSPLANT N/A 1/6/2022 "    Procedure: TRANSPLANT, LIVER;  Surgeon: Lizet Garcia MD;  Location: [facility] OR [facility] FLR;  Service: Transplant;  Laterality: N/A;    RIGHT HEART CATHETERIZATION Right 5/7/2021    Procedure: INSERTION, CATHETER, RIGHT HEART;  Surgeon: Jaden Schuster MD;  Location: [facility] CATH LAB;  Service: Cardiology;  Laterality: Right;    TREATMENT OF CARDIAC ARRHYTHMIA N/A 5/19/2021    Procedure: CARDIOVERSION;  Surgeon: Didier Tilley MD;  Location: [facility] EP LAB;  Service: Cardiology;  Laterality: N/A;  a fib, dccv/johny, mac, dm. sscu    TREATMENT OF CARDIAC ARRHYTHMIA N/A 5/31/2021    Procedure: CARDIOVERSION;  Surgeon: Daniel Arambula MD;  Location: [facility] EP LAB;  Service: Cardiology;  Laterality: N/A;  afib, DCCV, anest., DM, 3 prep    TREATMENT OF CARDIAC ARRHYTHMIA N/A 7/7/2021    Procedure: Cardioversion or Defibrillation;  Surgeon: Didier Tilley MD;  Location: [facility] EP LAB;  Service: Cardiology;  Laterality: N/A;  AF, JOHNY (cancel if complaint), DCCV, MAC, DM, 3 Prep    TREATMENT OF CARDIAC ARRHYTHMIA N/A 7/27/2021    Procedure: Cardioversion or Defibrillation;  Surgeon: Didier Tilley MD;  Location: [facility] EP LAB;  Service: Cardiology;  Laterality: N/A;  AF, JOHNY (cx if complaint), DCCV, MAC, DM, 3 Prep       Review of patient's allergies indicates:   Allergen Reactions    Bee pollens Swelling     BEE STINGS swells body       Medications:  Facility-Administered Medications Prior to Admission   Medication    acetaminophen tablet 650 mg    albuterol inhaler 2 puff    diphenhydrAMINE injection 25 mg    EPINEPHrine (EPIPEN) 0.3 mg/0.3 mL pen injection 0.3 mg    methylPREDNISolone sodium succinate injection 40 mg    ondansetron disintegrating tablet 4 mg    sodium chloride 0.9% 500 mL flush bag    sodium chloride 0.9% flush 10 mL     Medications Prior to Admission   Medication Sig    amiodarone (PACERONE) 200 MG Tab Take 1 tablet (200 mg total) by mouth once daily.    amLODIPine (NORVASC) 10 MG tablet Take 1  "tablet (10 mg total) by mouth once daily.    apixaban (ELIQUIS) 5 mg Tab Take 1 tablet (5 mg total) by mouth 2 (two) times daily.    aspirin (ECOTRIN) 81 MG EC tablet Take 81 mg by mouth once daily.    blood sugar diagnostic Strp To check BG 2 times daily, to use with insurance preferred meter    blood-glucose meter kit To check BG 2 times daily, to use with insurance preferred meter    calcium carbonate (TUMS) 200 mg calcium (500 mg) chewable tablet Take 1 tablet (500 mg total) by mouth 3 (three) times daily as needed for Heartburn.    calcium carbonate-vitamin D3 600 mg-20 mcg (800 unit) Tab Take 1 tablet by mouth once daily.    gabapentin (NEURONTIN) 300 MG capsule Take 1 capsule (300 mg total) by mouth 3 (three) times daily as needed (neuropathy).    insulin (LANTUS SOLOSTAR U-100 INSULIN) glargine 100 units/mL (3mL) SubQ pen Inject 24 Units into the skin every evening.    insulin lispro (HUMALOG KWIKPEN INSULIN) 100 unit/mL pen Inject 6 Units into the skin 3 (three) times daily. Plus sliding scale, MDD: 48 units    lancets Misc To check BG 2 times daily, to use with insurance preferred meter    lovastatin (MEVACOR) 20 MG tablet     multivitamin capsule Take 1 capsule by mouth once daily.    mycophenolate (CELLCEPT) 250 mg Cap Take 4 capsules (1,000 mg total) by mouth 2 (two) times daily.    nystatin (MYCOSTATIN) 100,000 unit/mL suspension Take 5 mLs (500,000 Units total) by mouth 3 (three) times daily after meals. STOP 1/26/22    omega-3 fatty acids/fish oil (FISH OIL-OMEGA-3 FATTY ACIDS) 300-1,000 mg capsule Take 1 capsule by mouth once daily.     oxyCODONE (ROXICODONE) 10 mg Tab immediate release tablet Take 1 tablet (10 mg total) by mouth every 4 (four) hours as needed for Pain.    pantoprazole (PROTONIX) 40 MG tablet Take 1 tablet (40 mg total) by mouth once daily.    pen needle, diabetic (BD ULTRA-FINE GÉNESIS PEN NEEDLE) 32 gauge x 5/32" Ndle Use with insulin once daily    predniSONE " "(DELTASONE) 5 MG tablet Take by mouth daily:  20mg 1/12-1/18, 15mg 1/19-1/25, 10mg 1/26-2/1, 5mg 2/2-2/8, STOP 2/9/22    sertraline (ZOLOFT) 25 MG tablet Take 1 tablet (25 mg total) by mouth once daily.    sulfamethoxazole-trimethoprim 400-80mg (BACTRIM,SEPTRA) 400-80 mg per tablet Take 1 tablet by mouth every morning. STOP 7/7/22    tacrolimus (PROGRAF) 1 MG Cap Take 2 capsules (2 mg total) by mouth every 12 (twelve) hours.    tamsulosin (FLOMAX) 0.4 mg Cap Take 1 capsule (0.4 mg total) by mouth every evening.    valGANciclovir (VALCYTE) 450 mg Tab Take 1 tablet (450 mg total) by mouth once daily. STOP 7/7/22     Antibiotics (From admission, onward)            Start     Stop Route Frequency Ordered    02/02/22 0700  sulfamethoxazole-trimethoprim 400-80mg per tablet 1 tablet         -- Oral Every morning 02/01/22 1449        Antifungals (From admission, onward)            None        Antivirals (From admission, onward)        Stop Route Frequency     valGANciclovir         -- Oral Daily     remdesivir 100 mg        "Followed by" Linked Group Details    02/06 0859 IV Daily           Immunization History   Administered Date(s) Administered    COVID-19, MRNA, LN-S, PF (Pfizer) (Purple Cap) 03/05/2021, 03/26/2021, 09/24/2021    Hepatitis A, Adult 04/12/2021, 10/12/2021    Hepatitis B (recombinant) Adjuvanted, 2 dose 04/12/2021, 06/14/2021    Influenza 11/17/2009, 11/17/2009, 02/29/2020    Influenza - Quadrivalent - MDCK - PF 02/29/2020    Influenza - Trivalent (ADULT) 11/17/2009    Influenza A (H1N1) 2009 Monovalent - IM 11/17/2009    Pneumococcal Conjugate - 13 Valent 12/18/2018, 04/12/2021    Pneumococcal Polysaccharide - 23 Valent 06/14/2021    Tdap 04/12/2021    Zoster Recombinant 04/12/2021, 06/14/2021       Family History    None       Social History     Socioeconomic History    Marital status:    Tobacco Use    Smoking status: Former Smoker     Quit date: 1/25/1980     Years since " quittin.0    Smokeless tobacco: Never Used   Substance and Sexual Activity    Alcohol use: Not Currently    Drug use: Never     Social Determinants of Health     Financial Resource Strain: Low Risk     Difficulty of Paying Living Expenses: Not very hard   Food Insecurity: No Food Insecurity    Worried About Running Out of Food in the Last Year: Never true    Ran Out of Food in the Last Year: Never true   Transportation Needs: No Transportation Needs    Lack of Transportation (Medical): No    Lack of Transportation (Non-Medical): No   Physical Activity: Unknown    Days of Exercise per Week: 0 days    Minutes of Exercise per Session: Patient refused   Stress: Stress Concern Present    Feeling of Stress : To some extent   Social Connections: Unknown    Frequency of Communication with Friends and Family: Once a week    Frequency of Social Gatherings with Friends and Family: Once a week    Active Member of Clubs or Organizations: Yes    Attends Club or Organization Meetings: 1 to 4 times per year    Marital Status:    Housing Stability: Low Risk     Unable to Pay for Housing in the Last Year: No    Number of Places Lived in the Last Year: 1    Unstable Housing in the Last Year: No     Review of Systems   Constitutional: Positive for activity change, appetite change and chills. Negative for fatigue and fever.   HENT: Negative for congestion and sore throat.    Eyes: Negative for visual disturbance.   Respiratory: Negative for cough, chest tightness, shortness of breath and wheezing.    Cardiovascular: Negative for chest pain and leg swelling.   Gastrointestinal: Positive for abdominal distention and constipation. Negative for abdominal pain, diarrhea, nausea and vomiting.   Genitourinary: Negative for decreased urine volume, dysuria and frequency.   Musculoskeletal: Negative for arthralgias and back pain.   Skin: Negative for wound.   Allergic/Immunologic: Positive for immunocompromised  state.   Neurological: Positive for light-headedness. Negative for dizziness, weakness and headaches.   Hematological: Bruises/bleeds easily.   Psychiatric/Behavioral: Positive for sleep disturbance. Negative for confusion.     Objective:     Vital Signs (Most Recent):  Temp: 97.9 °F (36.6 °C) (02/02/22 1554)  Pulse: 95 (02/02/22 1554)  Resp: (!) 22 (02/02/22 1554)  BP: (!) 122/57 (02/02/22 1554)  SpO2: (!) 91 % (02/02/22 1554) Vital Signs (24h Range):  Temp:  [97.8 °F (36.6 °C)-98.7 °F (37.1 °C)] 97.9 °F (36.6 °C)  Pulse:  [69-95] 95  Resp:  [14-22] 22  SpO2:  [91 %-96 %] 91 %  BP: (117-134)/(57-75) 122/57     Weight: 99.8 kg (220 lb 0.3 oz)  Body mass index is 29.84 kg/m².    Estimated Creatinine Clearance: 50.9 mL/min (A) (based on SCr of 1.7 mg/dL (H)).    Physical Exam  Vitals and nursing note reviewed.   Constitutional:       General: He is not in acute distress.  HENT:      Head: Normocephalic and atraumatic.      Right Ear: External ear normal.      Left Ear: External ear normal.      Nose: Nose normal.      Mouth/Throat:      Mouth: Mucous membranes are moist.   Eyes:      General: No scleral icterus.     Extraocular Movements: Extraocular movements intact.      Conjunctiva/sclera: Conjunctivae normal.   Cardiovascular:      Rate and Rhythm: Normal rate and regular rhythm.      Pulses: Normal pulses.      Heart sounds: Murmur (grade 1/6 systolic murmur along LSB) heard.       Pulmonary:      Effort: Pulmonary effort is normal. No respiratory distress.      Breath sounds: Normal breath sounds. No rhonchi.   Abdominal:      General: Bowel sounds are normal. There is no distension.      Palpations: Abdomen is soft.      Tenderness: There is no abdominal tenderness.      Comments: Surgical scar intact.    Musculoskeletal:      Cervical back: Normal range of motion and neck supple.      Right lower leg: No edema.      Left lower leg: No edema.   Skin:     General: Skin is warm and dry.      Capillary Refill:  Capillary refill takes less than 2 seconds.   Neurological:      General: No focal deficit present.      Mental Status: He is alert and oriented to person, place, and time. Mental status is at baseline.      Motor: No weakness.   Psychiatric:         Mood and Affect: Mood normal.         Behavior: Behavior normal.         Thought Content: Thought content normal.         Judgment: Judgment normal.         Significant Labs:   CBC:   Recent Labs   Lab 02/01/22  1557 02/01/22  1557 02/01/22  1716 02/02/22  0431 02/02/22  1407   WBC 6.01  --   --  5.44 6.44   HGB 5.5*   < > 5.2* 6.0*  6.0* 8.0*   HCT 17.9*   < > 16.8* 19.7*  19.6* 25.7*     --   --  197 222    < > = values in this interval not displayed.     CMP:   Recent Labs   Lab 02/01/22  1557 02/02/22  0431   * 131*   K 4.9 5.0   CL 97 97   CO2 27 28   * 176*   BUN 30* 35*   CREATININE 1.3 1.7*   CALCIUM 9.2 9.0   PROT 5.4* 5.1*   ALBUMIN 3.0* 2.8*   BILITOT 0.5 0.7   ALKPHOS 47* 45*   AST 11 11   ALT 12 8*   ANIONGAP 7* 6*   EGFRNONAA 56.1* 40.5*     All pertinent labs within the past 24 hours have been reviewed.    Significant Imaging: I have reviewed all pertinent imaging results/findings within the past 24 hours.

## 2022-02-02 NOTE — SUBJECTIVE & OBJECTIVE
Past Medical History:   Diagnosis Date    Atrial fibrillation     Cirrhosis 11/23/2020    Diabetes mellitus, type 2     Diabetic neuropathy 11/24/2020    Disorder of kidney and ureter     HTN (hypertension) 11/23/2020    Hyperlipidemia 11/23/2020    Kidney stones 11/23/2020    S/p lithotripsy 2020    Leukocytosis 1/15/2021    Liver mass 11/23/2020    CASTILLO (nonalcoholic steatohepatitis) 11/23/2020    PAD (peripheral artery disease)     Portal hypertension 11/23/2020    Skin cancer 11/23/2020       Past Surgical History:   Procedure Laterality Date    COLONOSCOPY  10/2020    ESOPHAGOGASTRODUODENOSCOPY  10/2020    ESOPHAGOGASTRODUODENOSCOPY N/A 7/1/2021    Procedure: EGD (ESOPHAGOGASTRODUODENOSCOPY);  Surgeon: Jovan David MD;  Location: [facility] (4TH FLR);  Service: Endoscopy;  Laterality: N/A;  HCC. Listed for liver transplant. EGD for variceal surveillance.  cardiac clearance and blood thinenr approval received, see telephone encounter 6/23/21-BB  fully vaccinated-BB  labs same day of procedure-BB    EXCISION OF HYDROCELE Right     hemorroid surgery      hemorroidectomy      KIDNEY STONE SURGERY      LEFT HEART CATHETERIZATION N/A 1/27/2021    Procedure: Left heart cath;  Surgeon: Kris Shelton MD;  Location: [facility] CATH LAB;  Service: Cardiology;  Laterality: N/A;    liver mass removal      LIVER TRANSPLANT N/A 1/6/2022    Procedure: TRANSPLANT, LIVER;  Surgeon: Lizet Garcia MD;  Location: [facility] OR 2ND FLR;  Service: Transplant;  Laterality: N/A;    RIGHT HEART CATHETERIZATION Right 5/7/2021    Procedure: INSERTION, CATHETER, RIGHT HEART;  Surgeon: Jaden Schuster MD;  Location: [facility] CATH LAB;  Service: Cardiology;  Laterality: Right;    TREATMENT OF CARDIAC ARRHYTHMIA N/A 5/19/2021    Procedure: CARDIOVERSION;  Surgeon: Didier Tilley MD;  Location: [facility] EP LAB;  Service: Cardiology;  Laterality: N/A;  a fib, dccv/johny, mac, dm. sscu    TREATMENT OF CARDIAC ARRHYTHMIA N/A  5/31/2021    Procedure: CARDIOVERSION;  Surgeon: Daniel Arambula MD;  Location: [facility] EP LAB;  Service: Cardiology;  Laterality: N/A;  afib, DCCV, anest., DM, 3 prep    TREATMENT OF CARDIAC ARRHYTHMIA N/A 7/7/2021    Procedure: Cardioversion or Defibrillation;  Surgeon: Didier Tilley MD;  Location: [facility] EP LAB;  Service: Cardiology;  Laterality: N/A;  AF, ASHISH (cancel if complaint), DCCV, MAC, DM, 3 Prep    TREATMENT OF CARDIAC ARRHYTHMIA N/A 7/27/2021    Procedure: Cardioversion or Defibrillation;  Surgeon: Didier Tilley MD;  Location: [facility] EP LAB;  Service: Cardiology;  Laterality: N/A;  AF, ASHISH (cx if complaint), DCCV, MAC, DM, 3 Prep       Review of patient's allergies indicates:   Allergen Reactions    Bee pollens Swelling     BEE STINGS swells body       Medications:  Facility-Administered Medications Prior to Admission   Medication    acetaminophen tablet 650 mg    albuterol inhaler 2 puff    diphenhydrAMINE injection 25 mg    EPINEPHrine (EPIPEN) 0.3 mg/0.3 mL pen injection 0.3 mg    methylPREDNISolone sodium succinate injection 40 mg    ondansetron disintegrating tablet 4 mg    sodium chloride 0.9% 500 mL flush bag    sodium chloride 0.9% flush 10 mL     Medications Prior to Admission   Medication Sig    amiodarone (PACERONE) 200 MG Tab Take 1 tablet (200 mg total) by mouth once daily.    amLODIPine (NORVASC) 10 MG tablet Take 1 tablet (10 mg total) by mouth once daily.    apixaban (ELIQUIS) 5 mg Tab Take 1 tablet (5 mg total) by mouth 2 (two) times daily.    aspirin (ECOTRIN) 81 MG EC tablet Take 81 mg by mouth once daily.    blood sugar diagnostic Strp To check BG 2 times daily, to use with insurance preferred meter    blood-glucose meter kit To check BG 2 times daily, to use with insurance preferred meter    calcium carbonate (TUMS) 200 mg calcium (500 mg) chewable tablet Take 1 tablet (500 mg total) by mouth 3 (three) times daily as needed for Heartburn.    calcium  "carbonate-vitamin D3 600 mg-20 mcg (800 unit) Tab Take 1 tablet by mouth once daily.    gabapentin (NEURONTIN) 300 MG capsule Take 1 capsule (300 mg total) by mouth 3 (three) times daily as needed (neuropathy).    insulin (LANTUS SOLOSTAR U-100 INSULIN) glargine 100 units/mL (3mL) SubQ pen Inject 24 Units into the skin every evening.    insulin lispro (HUMALOG KWIKPEN INSULIN) 100 unit/mL pen Inject 6 Units into the skin 3 (three) times daily. Plus sliding scale, MDD: 48 units    lancets Misc To check BG 2 times daily, to use with insurance preferred meter    lovastatin (MEVACOR) 20 MG tablet     multivitamin capsule Take 1 capsule by mouth once daily.    mycophenolate (CELLCEPT) 250 mg Cap Take 4 capsules (1,000 mg total) by mouth 2 (two) times daily.    nystatin (MYCOSTATIN) 100,000 unit/mL suspension Take 5 mLs (500,000 Units total) by mouth 3 (three) times daily after meals. STOP 1/26/22    omega-3 fatty acids/fish oil (FISH OIL-OMEGA-3 FATTY ACIDS) 300-1,000 mg capsule Take 1 capsule by mouth once daily.     oxyCODONE (ROXICODONE) 10 mg Tab immediate release tablet Take 1 tablet (10 mg total) by mouth every 4 (four) hours as needed for Pain.    pantoprazole (PROTONIX) 40 MG tablet Take 1 tablet (40 mg total) by mouth once daily.    pen needle, diabetic (BD ULTRA-FINE GÉNESIS PEN NEEDLE) 32 gauge x 5/32" Ndle Use with insulin once daily    predniSONE (DELTASONE) 5 MG tablet Take by mouth daily:  20mg 1/12-1/18, 15mg 1/19-1/25, 10mg 1/26-2/1, 5mg 2/2-2/8, STOP 2/9/22    sertraline (ZOLOFT) 25 MG tablet Take 1 tablet (25 mg total) by mouth once daily.    sulfamethoxazole-trimethoprim 400-80mg (BACTRIM,SEPTRA) 400-80 mg per tablet Take 1 tablet by mouth every morning. STOP 7/7/22    tacrolimus (PROGRAF) 1 MG Cap Take 2 capsules (2 mg total) by mouth every 12 (twelve) hours.    tamsulosin (FLOMAX) 0.4 mg Cap Take 1 capsule (0.4 mg total) by mouth every evening.    valGANciclovir (VALCYTE) 450 mg Tab " "Take 1 tablet (450 mg total) by mouth once daily. STOP 22     Antibiotics (From admission, onward)            Start     Stop Route Frequency Ordered    22 0700  sulfamethoxazole-trimethoprim 400-80mg per tablet 1 tablet         -- Oral Every morning 22 1449        Antifungals (From admission, onward)            None        Antivirals (From admission, onward)        Stop Route Frequency     valGANciclovir         -- Oral Daily     remdesivir 100 mg        "Followed by" Linked Group Details     0859 IV Daily           Immunization History   Administered Date(s) Administered    COVID-19, MRNA, LN-S, PF (Pfizer) (Purple Cap) 2021, 2021, 2021    Hepatitis A, Adult 2021, 10/12/2021    Hepatitis B (recombinant) Adjuvanted, 2 dose 2021, 2021    Influenza 2009, 2009, 2020    Influenza - Quadrivalent - MDCK - PF 2020    Influenza - Trivalent (ADULT) 2009    Influenza A (H1N1) 2009 Monovalent - IM 2009    Pneumococcal Conjugate - 13 Valent 2018, 2021    Pneumococcal Polysaccharide - 23 Valent 2021    Tdap 2021    Zoster Recombinant 2021, 2021       Family History    None       Social History     Socioeconomic History    Marital status:    Tobacco Use    Smoking status: Former Smoker     Quit date: 1980     Years since quittin.0    Smokeless tobacco: Never Used   Substance and Sexual Activity    Alcohol use: Not Currently    Drug use: Never     Social Determinants of Health     Financial Resource Strain: Low Risk     Difficulty of Paying Living Expenses: Not very hard   Food Insecurity: No Food Insecurity    Worried About Running Out of Food in the Last Year: Never true    Ran Out of Food in the Last Year: Never true   Transportation Needs: No Transportation Needs    Lack of Transportation (Medical): No    Lack of Transportation (Non-Medical): No   Physical " Activity: Unknown    Days of Exercise per Week: 0 days    Minutes of Exercise per Session: Patient refused   Stress: Stress Concern Present    Feeling of Stress : To some extent   Social Connections: Unknown    Frequency of Communication with Friends and Family: Once a week    Frequency of Social Gatherings with Friends and Family: Once a week    Active Member of Clubs or Organizations: Yes    Attends Club or Organization Meetings: 1 to 4 times per year    Marital Status:    Housing Stability: Low Risk     Unable to Pay for Housing in the Last Year: No    Number of Places Lived in the Last Year: 1    Unstable Housing in the Last Year: No     Review of Systems   Constitutional: Positive for activity change, appetite change and chills. Negative for fatigue and fever.   HENT: Negative for congestion and sore throat.    Eyes: Negative for visual disturbance.   Respiratory: Negative for cough, chest tightness, shortness of breath and wheezing.    Cardiovascular: Negative for chest pain and leg swelling.   Gastrointestinal: Positive for abdominal distention and constipation. Negative for abdominal pain, diarrhea, nausea and vomiting.   Genitourinary: Negative for decreased urine volume, dysuria and frequency.   Musculoskeletal: Negative for arthralgias and back pain.   Skin: Negative for wound.   Allergic/Immunologic: Positive for immunocompromised state.   Neurological: Positive for light-headedness. Negative for dizziness, weakness and headaches.   Hematological: Bruises/bleeds easily.   Psychiatric/Behavioral: Positive for sleep disturbance. Negative for confusion.     Objective:     Vital Signs (Most Recent):  Temp: 97.9 °F (36.6 °C) (02/02/22 1554)  Pulse: 95 (02/02/22 1554)  Resp: (!) 22 (02/02/22 1554)  BP: (!) 122/57 (02/02/22 1554)  SpO2: (!) 91 % (02/02/22 1554) Vital Signs (24h Range):  Temp:  [97.8 °F (36.6 °C)-98.7 °F (37.1 °C)] 97.9 °F (36.6 °C)  Pulse:  [69-95] 95  Resp:  [14-22]  22  SpO2:  [91 %-96 %] 91 %  BP: (117-134)/(57-75) 122/57     Weight: 99.8 kg (220 lb 0.3 oz)  Body mass index is 29.84 kg/m².    Estimated Creatinine Clearance: 50.9 mL/min (A) (based on SCr of 1.7 mg/dL (H)).    Physical Exam  Vitals and nursing note reviewed.   Constitutional:       General: He is not in acute distress.  HENT:      Head: Normocephalic and atraumatic.      Right Ear: External ear normal.      Left Ear: External ear normal.      Nose: Nose normal.      Mouth/Throat:      Mouth: Mucous membranes are moist.   Eyes:      General: No scleral icterus.     Extraocular Movements: Extraocular movements intact.      Conjunctiva/sclera: Conjunctivae normal.   Cardiovascular:      Rate and Rhythm: Normal rate and regular rhythm.      Pulses: Normal pulses.      Heart sounds: Murmur (grade 1/6 systolic murmur along LSB) heard.       Pulmonary:      Effort: Pulmonary effort is normal. No respiratory distress.      Breath sounds: Normal breath sounds. No rhonchi.   Abdominal:      General: Bowel sounds are normal. There is no distension.      Palpations: Abdomen is soft.      Tenderness: There is no abdominal tenderness.      Comments: Surgical scar intact.    Musculoskeletal:      Cervical back: Normal range of motion and neck supple.      Right lower leg: No edema.      Left lower leg: No edema.   Skin:     General: Skin is warm and dry.      Capillary Refill: Capillary refill takes less than 2 seconds.   Neurological:      General: No focal deficit present.      Mental Status: He is alert and oriented to person, place, and time. Mental status is at baseline.      Motor: No weakness.   Psychiatric:         Mood and Affect: Mood normal.         Behavior: Behavior normal.         Thought Content: Thought content normal.         Judgment: Judgment normal.         Significant Labs:   CBC:   Recent Labs   Lab 02/01/22  1557 02/01/22  1557 02/01/22  1716 02/02/22  0431 02/02/22  1407   WBC 6.01  --   --  5.44 6.44    HGB 5.5*   < > 5.2* 6.0*  6.0* 8.0*   HCT 17.9*   < > 16.8* 19.7*  19.6* 25.7*     --   --  197 222    < > = values in this interval not displayed.     CMP:   Recent Labs   Lab 02/01/22  1557 02/02/22  0431   * 131*   K 4.9 5.0   CL 97 97   CO2 27 28   * 176*   BUN 30* 35*   CREATININE 1.3 1.7*   CALCIUM 9.2 9.0   PROT 5.4* 5.1*   ALBUMIN 3.0* 2.8*   BILITOT 0.5 0.7   ALKPHOS 47* 45*   AST 11 11   ALT 12 8*   ANIONGAP 7* 6*   EGFRNONAA 56.1* 40.5*     All pertinent labs within the past 24 hours have been reviewed.    Significant Imaging: I have reviewed all pertinent imaging results/findings within the past 24 hours.

## 2022-02-02 NOTE — ASSESSMENT & PLAN NOTE
BG goal 140-180    Increase Levemir to 24 units q HS (home dose; fasting BG above goal ranges)   Increase Novolog to 6 units TID with meals (20% dose increase; prandial BG excursions noted)   Low Dose Correction Scale  BG monitoring ac/hs    ** Please call Endocrine for any BG related issues **    Discharge plans: TBD

## 2022-02-02 NOTE — PLAN OF CARE
Problem: Adult Inpatient Plan of Care  Goal: Plan of Care Review  Outcome: Ongoing, Not Progressing  Goal: Patient-Specific Goal (Individualized)  Outcome: Ongoing, Not Progressing  Goal: Absence of Hospital-Acquired Illness or Injury  Outcome: Ongoing, Not Progressing  Goal: Optimal Comfort and Wellbeing  Outcome: Ongoing, Not Progressing     Problem: Adult Inpatient Plan of Care  Goal: Absence of Hospital-Acquired Illness or Injury  Outcome: Ongoing, Not Progressing     Problem: Diabetes Comorbidity  Goal: Blood Glucose Level Within Targeted Range  Outcome: Ongoing, Not Progressing    Patient direct admit. Patient v/s assessed. Patient A&0x4 . Patient get SOB when standing. Patient orthostatics complete. Patient Ivs started. Patient has no noted distress at this time.

## 2022-02-02 NOTE — HOSPITAL COURSE
Mr. Purdy was admitted 2/1 for increasing weakness. Labs on admission revealed a h/h of 5.5/17.9. Has received 2 units of PRBCs as of 2/2, h/h responded appropriately. ASA and elliquis held. CT abd/pelvis was attained to assess for hemorrhage. CT revealed new moderate abdominopelvic ascites that appears slightly complex in the pelvis. Para 2/3 with 4.5L of bloody fluid drained, labs with some concern for SBP, cefepime started. Occult blood stool negative. Cardiac w/u without acute findings, infectious w/u with BCs NGTD, clean UA. CXR with no acute cardiopulmonary disease. Now transitioned to cefpoxodine  on 2/5 for 7 days Patient has remained afebrile. At discharge resumed ASA and apixaban.  Patient reports feeling stronger OF NOTE: Resume Cellcept on 2/11.   Discharge instructions reviewed by pharmacist, nursing and transplant coordinator.  Patient  verbalized understanding and in agreement with discharge from hospital today.  Patient is medically stable for discharge. Patient will f/u with labs per transplant coordinator.

## 2022-02-02 NOTE — PROGRESS NOTES
Admit Note     Spoke with patient via phone to assess needs, due to pt being admitted for covid and is on the covid unit. Patient is a 66 y.o.  male, admitted for covid.      Patient admitted from the ED on 2/1/2022 .  At this time, patient presents as alert and oriented x 4 and agitated.  At this time, patients caregiver presents as not present.    Household/Family Systems     Patient resides with patient's significant other, at:     [address] [facility]  [place] [address].      S/O Aniya, c: [phone]  Pt cell: [phone]  Addison Purdy, pt son, c: [phone]    Support system includes s/o, Aniya and son.  Patient does not have dependents that are need of being cared for.     Patients primary caregiver is Anyia, patients significant other.  Confirmed patients contact information is [phone] (home);   Telephone Information:   Mobile [phone]     During admission, patient's caregiver plans to stay at home.  Confirmed patient and patients caregivers do have access to reliable transportation.    Cognitive Status/Learning     Patient reports reading ability as college and states patient does have difficulty with seeing and wears glasses.  Patient reports patient learns best by reading and hands on.   Needed: No.   Highest education level: Associate/Bachelor Degree    Vocation/Disability     Working for Income: No  If no, reason not working: Patient Choice - Retired  Patient is retired, pt previously a teacher in [employer].    Adherence     Patient reports a high level of adherence to patients health care regimen.  Adherence counseling and education provided. Patient verbalizes understanding.    Substance Use    Patient reports the following substance usage.    Tobacco: none, patient denies any use.  Alcohol: No use since 2006, pt reports social usage prior to this.  Illicit Drugs/Non-prescribed Medications: none, patient denies any use.  Patient states clear  understanding of the potential impact of substance use.  Substance abstinence/cessation counseling, education and resources provided and reviewed.     Services Utilizing/ADLS    Infusion Service: Prior to admission, patient utilizing? no, [facility] in the past  Home Health: Prior to admission, patient utilizing? no, [facility] in the past  DME: Prior to admission, no  Pulmonary/Cardiac Rehab: Prior to admission, no  Dialysis:  Prior to admission, no  Transplant Specialty Pharmacy:  Prior to admission, yes; [facility].    Prior to admission, patient reports patient was not independent with ADLS and was not driving.  Patient reports patient is not able to care for self at this time due to compromised medical condition (as documented in medical record) and physical weakness..  Patient indicates a willingness to care for self once medically cleared to do so.    Insurance/Medications    Insured by   Payer/Plan Subscr  Sex Relation Sub. Ins. ID Effective Group Num   1. [employer]* KENYA VEGA 1955 Male Self [id number]                                   [address]      Primary Insurance (for UNOS reporting): Public Insurance - Medicare FFS (Fee For Service)  Secondary Insurance (for UNOS reporting): None    Patient reports patient is able to obtain and afford medications at this time and at time of discharge.    Living Will/Healthcare Power of     Patient states patient does not have a LW and/or HCPA.   provided education regarding LW and HCPA and the completion of forms.    Coping/Mental Health    Patient is coping adequately with the aid of  family members.   Patient denies mental health difficulties.     Discharge Planning    At time of discharge, patient plans to return to patient's home under the care of self, s/o and son.  Patients significant other will transport patient.  Per rounds today, expected discharge date has not been medically determined at  this time. Patient and patients caregiver  verbalize understanding and are involved in treatment planning and discharge process.    Additional Concerns    Patient is being followed for needs, education, resources, information, emotional support, supportive counseling, and for supportive and skilled discharge plan of care.  providing ongoing psychosocial support, education, resources and d/c planning as needed.  SW remains available.  remains available.

## 2022-02-02 NOTE — SUBJECTIVE & OBJECTIVE
Interval HPI:   Overnight events: Remains on COVID floor. BG at or above goal ranges on current SQ insulin regimen. Remains on Prednisone 5 mg daily. Diet diabetic [facility]; 2000 Calorie  Eating 100%  Nausea: No  Hypoglycemia and intervention: No  Fever: No  TPN and/or TF: No  If yes, type of TF/TPN and rate: n/a    /75   Pulse 91   Temp 97.8 °F (36.6 °C)   Resp 16   Ht 6' (1.829 m)   Wt 99.8 kg (220 lb 0.3 oz)   SpO2 95%   BMI 29.84 kg/m²     Labs Reviewed and Include    Recent Labs   Lab 02/02/22  0431   *   CALCIUM 9.0   ALBUMIN 2.8*   PROT 5.1*   *   K 5.0   CO2 28   CL 97   BUN 35*   CREATININE 1.7*   ALKPHOS 45*   ALT 8*   AST 11   BILITOT 0.7     Lab Results   Component Value Date    WBC 5.44 02/02/2022    HGB 6.0 (L) 02/02/2022    HGB 6.0 (L) 02/02/2022    HCT 19.6 (LL) 02/02/2022    HCT 19.7 (LL) 02/02/2022    MCV 97 02/02/2022     02/02/2022     No results for input(s): TSH, FREET4 in the last 168 hours.  Lab Results   Component Value Date    HGBA1C 5.8 (H) 01/05/2022       Nutritional status:   Body mass index is 29.84 kg/m².  Lab Results   Component Value Date    ALBUMIN 2.8 (L) 02/02/2022    ALBUMIN 3.0 (L) 02/01/2022    ALBUMIN 3.2 (L) 01/24/2022     No results found for: PREALBUMIN    Estimated Creatinine Clearance: 50.9 mL/min (A) (based on SCr of 1.7 mg/dL (H)).    Accu-Checks  Recent Labs     02/01/22 2124   POCTGLUCOSE 304*       Current Medications and/or Treatments Impacting Glycemic Control  Immunotherapy:    Immunosuppressants         Stop Route Frequency     tacrolimus capsule 2 mg         -- Oral 2 times daily        Steroids:   Hormones (From admission, onward)            Start     Stop Route Frequency Ordered    02/02/22 0900  predniSONE tablet 5 mg         02/09 0859 Oral Daily 02/01/22 1528    02/01/22 1448  melatonin tablet 6 mg         -- Oral Nightly PRN 02/01/22 1449        Pressors:    Autonomic Drugs (From admission, onward)            None         Hyperglycemia/Diabetes Medications:   Antihyperglycemics (From admission, onward)            Start     Stop Route Frequency Ordered    02/01/22 2100  insulin detemir U-100 pen 18 Units         -- SubQ Nightly 02/01/22 1449    02/01/22 1645  insulin aspart U-100 pen 4 Units         -- SubQ 3 times daily with meals 02/01/22 1449    02/01/22 1449  insulin aspart U-100 pen 0-5 Units         -- SubQ Before meals & nightly PRN 02/01/22 1449

## 2022-02-02 NOTE — HPI
"Mr. Tej Purdy is a 66 y.o.  male with PMH CASTILLO Cirrhosis c/b HE and HCC s/p TACE and RFA in 12/2020, s/p DBDLT (intra-op with replaced LHA reconstructed; CMV D+/R-, on [place]ro/MMF/Pred), recent COVID-19 positive on 1/26 s/p sotrovimab on 1/27, T2DM, HTN, Afib, HLD; admitted on 2/1/2022 with progressive weakness and fatigue for 1 week. He reports abdominal distension "swelling" and constipation, but otherwise no fever/chills, N/V/D, dysuria, cough/SOB, chest pain. He reports chronic abdominal pain at transplant/surgery site, but no change in intensity/frequency of pain. Work up with new decreased in Hgb (from 10 on 1/24 to 5 on 2/1), elevated retic 10. CT ab/pelvis with moderate ascites and R pleural effusion. IR consulted for paracentesis. Our service is consulted for recommendations on COVID-19.      "

## 2022-02-02 NOTE — ASSESSMENT & PLAN NOTE
- patient with covid 1/26   - missed labs due to weakness   - will treat covid  - w/u for other potential infectious process negative thus far   - EKG with NSR, troponin/bnp negative, cxr stable   - h/h low, see anemia of unknown origin

## 2022-02-02 NOTE — ASSESSMENT & PLAN NOTE
- hgb 5.5 on admission   - ASA and elliquis held  - 2 units PRBCs transfused 2/2  - 1st unit with hgb response to 6   - 2nd unit response hgb pending   - RBC studies with some type of potential hemolytic process, retics elevated   - continuing evaluation with plan for para, CMV, occult blood stool  - monitor closely

## 2022-02-02 NOTE — PROGRESS NOTES
[facility] Tele[facility]  Liver Transplant  Progress Note    Patient Name: Tej Purdy  MRN: [id number]  Admission Date: 2/1/2022  Hospital Length of Stay: 1 days  Code Status: Full Code  Primary Care Provider: Thais Maxwell MD  Post-Operative Day: 27    ORGAN:   LIVER  Disease Etiology: Primary Liver Malignancy: Hepatoma (HCC) and Cirrhosis  Donor Type:   Donation after Brain Death  CDC High Risk:   No  Donor CMV Status:   Donor CMV Status: Positive  Donor HBcAB:   Negative  Donor HCV Status:   Negative  Donor HBV JAYLIN: Negative  Donor HCV JAYLIN: Negative  Whole or Partial: Whole Liver  Biliary Anastomosis: End to End  Arterial Anatomy: Replaced Left Hepatic from Left Gastric  Subjective:     History of Present Illness:  Mr. 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 Operation notable for replaced LHA, reconstructed. Post op course without complication. Patient with history of afib followed by cardiology who recommends continuing amiodarone with elliquis anticoagulation. 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 Patient to follow up with medical oncology for possible further treatment. Appointment scheduled for 1/26 cancelled due to testing positive for COVID19 1/26. He had an infusion for COVID on 1/27 without complication. Patient is being admitted for COVID treatment and monitoring. He was unable to make his lab appointments due to weakness. Patient reports generalized weakness, especially with activity, as well as decreased appetite, light headedness, and constipation. Denies cough, SOB, CP, palpitations, NVD, changes in urinary function. Due to recent transplant, infectious workup started on admission. Will treat COVID with remdesivir. Cellcept held for infection. Cardiac workup in process as well.       Hospital Course:  Mr. Purdy was admitted 2/1 for increasing weakness. Labs on admission revealed a h/h of 5.5/17.9. 1 unit of PRBCs was administered  with hgb response of 6. ASA and elliquis held. CT abd/pelvis was attained to assess for hemorrhage. CT revealed new moderate abdominopelvic ascites that appears slightly complex in the pelvis. Discussed CT with Dr. Lozada, unsure if this is where blood loss has come from. Para ordered 2/2 for evaluation of fluid. Cardiac w/u without acute findings, infectious w/u with BCs NGTD, clean UA. CXR with no acute cardiopulmonary disease.    Interval History   Patient feeling well this morning but tired. Continuing investigation into anemia today. Has received 2 units of PRBCs as of 2/2. Repeat CBC this afternoon. Ordered para for eval of fluid 2/2. Occult blood stool ordered. He has not had a bowel movement in ~3 days. Added to regimen. Replaced electrolytes. VSS, labs otherwise stable. Continue to monitor patient and labs closely.      Scheduled Meds:   amiodarone  200 mg Oral Daily    amLODIPine  10 mg Oral Daily    docusate sodium  50 mg Oral BID    insulin aspart U-100  4 Units Subcutaneous TIDWM    insulin detemir U-100  18 Units Subcutaneous QHS    magnesium oxide  400 mg Oral BID    pantoprazole  40 mg Oral Daily    pravastatin  20 mg Oral Daily    predniSONE  5 mg Oral Daily    remdesivir infusion  100 mg Intravenous Daily    sertraline  25 mg Oral Daily    sulfamethoxazole-trimethoprim 400-80mg  1 tablet Oral QAM    tacrolimus  2 mg Oral BID    tamsulosin  0.4 mg Oral QHS    valGANciclovir  450 mg Oral Daily     Continuous Infusions:  PRN Meds:sodium chloride, sodium chloride, acetaminophen, bisacodyL, dextrose 50%, dextrose 50%, gabapentin, glucagon (human recombinant), glucose, glucose, insulin aspart U-100, melatonin, ondansetron, sodium chloride 0.9%    Review of Systems   Constitutional: Positive for activity change (low energy), appetite change (decreased) and fatigue. Negative for chills and fever.   HENT: Negative for congestion, sinus pressure and sore throat.    Respiratory: Negative for  cough, chest tightness, shortness of breath and wheezing.    Cardiovascular: Negative for chest pain, palpitations and leg swelling.   Gastrointestinal: Positive for abdominal distention and constipation. Negative for abdominal pain, diarrhea, nausea and vomiting.   Genitourinary: Negative for decreased urine volume, difficulty urinating, dysuria and frequency.   Musculoskeletal: Negative for arthralgias and back pain.   Skin: Negative for wound.   Allergic/Immunologic: Positive for immunocompromised state.   Neurological: Positive for light-headedness. Negative for dizziness, syncope, weakness and headaches.   Hematological: Bruises/bleeds easily.     Objective:     Vital Signs (Most Recent):  Temp: 98 °F (36.7 °C) (02/02/22 1112)  Pulse: 84 (02/02/22 1112)  Resp: 15 (02/02/22 1112)  BP: 124/61 (02/02/22 1112)  SpO2: (!) 94 % (02/02/22 1112) Vital Signs (24h Range):  Temp:  [97 °F (36.1 °C)-98.7 °F (37.1 °C)] 98 °F (36.7 °C)  Pulse:  [69-97] 84  Resp:  [14-22] 15  SpO2:  [91 %-96 %] 94 %  BP: (103-147)/(55-92) 124/61     Weight: 99.8 kg (220 lb 0.3 oz)  Body mass index is 29.84 kg/m².    Intake/Output - Last 3 Shifts       01/31 0700  02/01 0659 02/01 0700  02/02 0659 02/02 0700  02/03 0659    P.O.  550     Blood  338.8     Other  300     IV Piggyback  250     Total Intake(mL/kg)  1438.8 (14.4)     Urine (mL/kg/hr)  420     Total Output  420     Net  +1018.8                  Physical Exam  Vitals reviewed.   Constitutional:       General: He is not in acute distress.     Appearance: Normal appearance.   HENT:      Head: Normocephalic and atraumatic.      Nose: No congestion.      Mouth/Throat:      Mouth: Mucous membranes are moist.   Eyes:      General: No scleral icterus.     Extraocular Movements: Extraocular movements intact.      Conjunctiva/sclera: Conjunctivae normal.      Pupils: Pupils are equal, round, and reactive to light.   Cardiovascular:      Rate and Rhythm: Normal rate and regular rhythm.       Pulses: Normal pulses.      Heart sounds: Murmur (systolic in nature) heard.       Pulmonary:      Effort: Pulmonary effort is normal. No respiratory distress.      Breath sounds: Normal breath sounds. No wheezing, rhonchi or rales.   Abdominal:      General: Bowel sounds are normal. There is no distension.      Palpations: Abdomen is soft.      Tenderness: There is no abdominal tenderness. There is no guarding.       Musculoskeletal:         General: Normal range of motion.      Cervical back: Normal range of motion and neck supple.   Skin:     General: Skin is warm and dry.      Findings: Bruising present.   Neurological:      General: No focal deficit present.      Mental Status: He is alert and oriented to person, place, and time. Mental status is at baseline.      Motor: No weakness.   Psychiatric:         Mood and Affect: Mood normal.         Behavior: Behavior normal.         Thought Content: Thought content normal.         Judgment: Judgment normal.         Laboratory:  Immunosuppressants         Stop Route Frequency     tacrolimus capsule 2 mg         -- Oral 2 times daily        CBC:   Recent Labs   Lab 02/02/22  0431   WBC 5.44   RBC 2.03*   HGB 6.0*  6.0*   HCT 19.7*  19.6*      MCV 97   MCH 29.6   MCHC 30.6*     CMP:   Recent Labs   Lab 02/02/22  0431   *   CALCIUM 9.0   ALBUMIN 2.8*   PROT 5.1*   *   K 5.0   CO2 28   CL 97   BUN 35*   CREATININE 1.7*   ALKPHOS 45*   ALT 8*   AST 11   BILITOT 0.7     Coagulation:   Recent Labs   Lab 02/01/22  1716   INR 1.2     Labs within the past 24 hours have been reviewed.    Diagnostic Results:  CT Abd/Pelvis: Results for orders placed during the hospital encounter of 02/01/22    CT Abdomen Pelvis  Without Contrast    Narrative  EXAMINATION:  CT ABDOMEN PELVIS WITHOUT CONTRAST    CLINICAL HISTORY:  s/p liver transplant 26 days ago. Admit with weakness, significant anemia. r/o bleeding. covid +;    TECHNIQUE:  Low dose axial images, sagittal  and coronal reformations were obtained from the lung bases to the pubic symphysis, Oral contrast was not administered.    COMPARISON:  Liver Doppler ultrasound 01/11/2022.  CT abdomen 11/10/2021.    FINDINGS:  Heart: Normal in size.  Trace pericardial effusion.  Aortic valve and coronary artery calcifications.    Lung Bases: Small right pleural effusion with subsegmental consolidation and air bronchogram involving the right lower lobe.  Minimal atelectatic changes involving the left lower lobe.    Liver: Status post liver transplant.  The hepatic parenchyma appears unremarkable.  Moderate abdominopelvic ascites that appears slightly complex and in the pelvis.    Gallbladder: Surgically absent.    Bile Ducts: No evidence of dilated ducts.    Pancreas: Multiple punctate pancreatic calcifications.    Spleen: Punctate calcified granuloma.    Adrenals: Unremarkable.    Kidneys/ Ureters: Bilateral renal hypodensities some are consistent with simple cysts and some are too small to characterize.  0.6 cm hyperdense focus in the left kidney which is too small to characterize and may represent a hemorrhagic/proteinaceous cyst (series 2, image 99).  No hydronephrosis or hydroureter.  Bilateral renal calcifications may represent vascular calcifications and/or nonobstructing stones.    Bladder: No evidence of wall thickening.    Reproductive organs: Dystrophic prostate calcifications.    GI Tract/Mesentery: Colonic diverticulosis without evidence of diverticulitis.  No evidence of bowel obstruction or inflammation.  The appendix is visualized and appear unremarkable.  No free intraperitoneal air.    Retroperitoneum: No significant adenopathy.    Abdominal wall: There is an umbilical hernia containing omental fat.  There are bilateral fat containing inguinal hernias.  There is minimal generalized body wall edema..    Vasculature: Atherosclerotic changes of the aorta and its branches.  No aneurysmal dilatation.    Bones: No acute  fracture or destructive process.  Degenerative changes of the spine.    Impression  Status post liver transplant.  New moderate abdominopelvic ascites that appears slightly complex in the pelvis.  No obvious hematocrit level to suggest hemorrhage.  Continued short-term follow-up is suggested.    Small right pleural effusion with subsegmental consolidation involving the right lower lobe.  These findings could be related to pneumonia or atelectasis.    Additional stable findings as above.    Electronically signed by resident: Toni Campoverde MD  Date:    02/01/2022  Time:    19:13    Electronically signed by: Rodrigo Temple MD  Date:    02/01/2022  Time:    19:45  XR Chest: Results for orders placed during the hospital encounter of 02/01/22    X-Ray Chest 1 View    Narrative  EXAMINATION:  XR CHEST 1 VIEW    CLINICAL HISTORY:  covid;    TECHNIQUE:  Single frontal view of the chest was performed.    COMPARISON:  01/13/2022    FINDINGS:  The heart size is upper normal.  Mediastinum shows aortic atherosclerosis.  Lungs are expanded and clear.  No acute lung consolidation or pleural fluid is detected.  He may have an old fracture of the right 5th rib.    Impression  No acute cardiopulmonary disease      Electronically signed by: Heriberto Hopkins MD  Date:    02/01/2022  Time:    15:26      Assessment/Plan:     * Anemia due to unknown mechanism  - hgb 5.5 on admission   - ASA and elliquis held  - 2 units PRBCs transfused 2/2  - 1st unit with hgb response to 6   - 2nd unit response hgb pending   - RBC studies with some type of potential hemolytic process, retics elevated   - continuing evaluation with plan for para, CMV, occult blood stool  - monitor closely     Type 2 diabetes mellitus with hyperglycemia  - endocrine consulted, appreciate help    Weakness  - patient with covid 1/26   - missed labs due to weakness   - will treat covid  - w/u for other potential infectious process negative thus far   - EKG with NSR, troponin/bnp  negative, cxr stable   - h/h low, see anemia of unknown origin       Long-term use of immunosuppressant medication  - see prophylactic immunotherapy       COVID-19  - patient unable to obtain labs because of weakness   - tested positive 1/26  - infusion 1/27 without complication   - ID consulted for treatment recs - started remdesivir 2/1, continuing home PO pred   - Sats 92-94% on RA, CXR without evidence of cardiopulmonary disease  - cellcept held       Anemia of chronic disease  - h/h decreased  - see anemia of unknown mechanism   - continue to monitor with CBC     Prophylactic immunotherapy  - maintanence on prograf, cellcept, prednisone   - prograf levels monitored daily for toxicity, therapeutic levels   - cellcept held for covid infection     At risk for opportunistic infections  - continue OI prophylaxis per protocol       S/P liver transplant  - 67 y/o man s/p DBD LTx on 1/6/21 for 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 Patient to follow up with medical oncology for possible further treatment. Appointment scheduled for 1/26 cancelled due to testing positive for COVID19 1/26.    Persistent atrial fibrillation  - continue home amiodarone   - holding home elliquis for decreased h/h  - admit EKG NSR    Type 2 diabetes mellitus  - monitor bg closely   - endocrine consulted, appreciate help    Diabetic neuropathy  - continuing home gabapentin      Hyperlipidemia  - continuing home statin        VTE Risk Mitigation (From admission, onward)         Ordered     IP VTE HIGH RISK PATIENT  Once         02/01/22 1449     Place sequential compression device  Until discontinued         02/01/22 1449                The patients clinical status was discussed at multidisplinary rounds, involving transplant surgery, transplant medicine, pharmacy, nursing, nutrition, and  social work    Discharge Planning:  PharmD review: will need endocrine follow up ( 1/24/22)  not yet stable for dc     Blanche Parker PA-C  Liver Transplant  [facility]

## 2022-02-02 NOTE — PROGRESS NOTES
[facility] - Tele[facility] Step[facility]  Endocrinology  Progress Note    Admit Date: 2/1/2022     Reason for Consult: Management of T2DM, Hyperglycemia     Surgical Procedure and Date: Liver Transplant 01/06/2022    Diabetes diagnosis year: 2000    Home Diabetes Medications:  Lantus 24 units q HS, Humalog 6 units TID with meals with correction scale    How often checking glucose at home?  4x daily    BG readings on regimen: 90-low 100s in the am and 200s throughout the afternoon  Hypoglycemia on the regimen?  No  Missed doses on regimen?  No    Diabetes Complications include:     Hyperglycemia and Diabetic peripheral neuropathy     Complicating diabetes co morbidities:   CIRRHOSIS and Active Cancer (skin), PAD, CASTILLO, HLD      HPI:   Patient is a 66 y.o. male with a diagnosis of s/p DBD LTx on 1/6/21 for ESLD 2/2 [facility]O/HCC. Operation notable for replaced LHA, reconstructed. Post op course without complication. Patient with history of afib followed by cardiology who recommends continuing amiodarone with elliquis anticoagulation. Appointment scheduled for 1/26 cancelled due to testing positive for COVID19 1/26. He had an infusion for COVID on 1/27 without complication. Patient is being admitted for COVID treatment and monitoring. He was unable to make his lab appointments due to weakness. Patient reports generalized weakness, especially with activity, as well as decreased appetite, light headedness, and constipation. Denies cough, SOB, CP, palpitations, NVD, changes in urinary function. Due to recent transplant, infectious workup started on admission. Will treat COVID with remdesivir. Endocrinology consulted for management of T2DM.        Lab Results   Component Value Date    HGBA1C 5.8 (H) 01/05/2022           Interval HPI:   Overnight events: Remains on COVID floor. BG at or above goal ranges on current SQ insulin regimen. Remains on Prednisone 5 mg daily. Diet diabetic [facility]; 2000 Calorie  Eating 100%  Nausea:  No  Hypoglycemia and intervention: No  Fever: No  TPN and/or TF: No  If yes, type of TF/TPN and rate: n/a    /75   Pulse 91   Temp 97.8 °F (36.6 °C)   Resp 16   Ht 6' (1.829 m)   Wt 99.8 kg (220 lb 0.3 oz)   SpO2 95%   BMI 29.84 kg/m²     Labs Reviewed and Include    Recent Labs   Lab 02/02/22  0431   *   CALCIUM 9.0   ALBUMIN 2.8*   PROT 5.1*   *   K 5.0   CO2 28   CL 97   BUN 35*   CREATININE 1.7*   ALKPHOS 45*   ALT 8*   AST 11   BILITOT 0.7     Lab Results   Component Value Date    WBC 5.44 02/02/2022    HGB 6.0 (L) 02/02/2022    HGB 6.0 (L) 02/02/2022    HCT 19.6 (LL) 02/02/2022    HCT 19.7 (LL) 02/02/2022    MCV 97 02/02/2022     02/02/2022     No results for input(s): TSH, FREET4 in the last 168 hours.  Lab Results   Component Value Date    HGBA1C 5.8 (H) 01/05/2022       Nutritional status:   Body mass index is 29.84 kg/m².  Lab Results   Component Value Date    ALBUMIN 2.8 (L) 02/02/2022    ALBUMIN 3.0 (L) 02/01/2022    ALBUMIN 3.2 (L) 01/24/2022     No results found for: PREALBUMIN    Estimated Creatinine Clearance: 50.9 mL/min (A) (based on SCr of 1.7 mg/dL (H)).    Accu-Checks  Recent Labs     02/01/22 2124   POCTGLUCOSE 304*       Current Medications and/or Treatments Impacting Glycemic Control  Immunotherapy:    Immunosuppressants         Stop Route Frequency     tacrolimus capsule 2 mg         -- Oral 2 times daily        Steroids:   Hormones (From admission, onward)            Start     Stop Route Frequency Ordered    02/02/22 0900  predniSONE tablet 5 mg         02/09 0859 Oral Daily 02/01/22 1528    02/01/22 1448  melatonin tablet 6 mg         -- Oral Nightly PRN 02/01/22 1449        Pressors:    Autonomic Drugs (From admission, onward)            None        Hyperglycemia/Diabetes Medications:   Antihyperglycemics (From admission, onward)            Start     Stop Route Frequency Ordered    02/01/22 2100  insulin detemir U-100 pen 18 Units         -- SubQ Nightly  02/01/22 1449    02/01/22 1645  insulin aspart U-100 pen 4 Units         -- SubQ 3 times daily with meals 02/01/22 1449    02/01/22 1449  insulin aspart U-100 pen 0-5 Units         -- SubQ Before meals & nightly PRN 02/01/22 1449          ASSESSMENT and PLAN    * Anemia due to unknown mechanism  Managed per primary team  May alter the accuracy of hemoglobin a1c        Type 2 diabetes mellitus with hyperglycemia  BG goal 140-180    Increase Levemir to 24 units q HS (home dose; fasting BG above goal ranges)   Increase Novolog to 6 units TID with meals (20% dose increase; prandial BG excursions noted)   Low Dose Correction Scale  BG monitoring ac/hs    ** Please call Endocrine for any BG related issues **    Discharge plans: TBD      COVID-19  May increase insulin resistance.         S/P liver transplant  Managed per primary team  Optimize BG control        Prophylactic immunotherapy  May increase insulin resistance.         Hyperlipidemia  May increase insulin resistance.             Yari Lorenz NP  Endocrinology  [facility] Tele[facility]

## 2022-02-02 NOTE — SUBJECTIVE & OBJECTIVE
Scheduled Meds:   amiodarone  200 mg Oral Daily    amLODIPine  10 mg Oral Daily    docusate sodium  50 mg Oral BID    insulin aspart U-100  4 Units Subcutaneous TIDWM    insulin detemir U-100  18 Units Subcutaneous QHS    magnesium oxide  400 mg Oral BID    pantoprazole  40 mg Oral Daily    pravastatin  20 mg Oral Daily    predniSONE  5 mg Oral Daily    remdesivir infusion  100 mg Intravenous Daily    sertraline  25 mg Oral Daily    sulfamethoxazole-trimethoprim 400-80mg  1 tablet Oral QAM    tacrolimus  2 mg Oral BID    tamsulosin  0.4 mg Oral QHS    valGANciclovir  450 mg Oral Daily     Continuous Infusions:  PRN Meds:sodium chloride, sodium chloride, acetaminophen, bisacodyL, dextrose 50%, dextrose 50%, gabapentin, glucagon (human recombinant), glucose, glucose, insulin aspart U-100, melatonin, ondansetron, sodium chloride 0.9%    Review of Systems   Constitutional: Positive for activity change (low energy), appetite change (decreased) and fatigue. Negative for chills and fever.   HENT: Negative for congestion, sinus pressure and sore throat.    Respiratory: Negative for cough, chest tightness, shortness of breath and wheezing.    Cardiovascular: Negative for chest pain, palpitations and leg swelling.   Gastrointestinal: Positive for abdominal distention and constipation. Negative for abdominal pain, diarrhea, nausea and vomiting.   Genitourinary: Negative for decreased urine volume, difficulty urinating, dysuria and frequency.   Musculoskeletal: Negative for arthralgias and back pain.   Skin: Negative for wound.   Allergic/Immunologic: Positive for immunocompromised state.   Neurological: Positive for light-headedness. Negative for dizziness, syncope, weakness and headaches.   Hematological: Bruises/bleeds easily.     Objective:     Vital Signs (Most Recent):  Temp: 98 °F (36.7 °C) (02/02/22 1112)  Pulse: 84 (02/02/22 1112)  Resp: 15 (02/02/22 1112)  BP: 124/61 (02/02/22 1112)  SpO2: (!) 94 %  (02/02/22 1112) Vital Signs (24h Range):  Temp:  [97 °F (36.1 °C)-98.7 °F (37.1 °C)] 98 °F (36.7 °C)  Pulse:  [69-97] 84  Resp:  [14-22] 15  SpO2:  [91 %-96 %] 94 %  BP: (103-147)/(55-92) 124/61     Weight: 99.8 kg (220 lb 0.3 oz)  Body mass index is 29.84 kg/m².    Intake/Output - Last 3 Shifts       01/31 0700  02/01 0659 02/01 0700  02/02 0659 02/02 0700  02/03 0659    P.O.  550     Blood  338.8     Other  300     IV Piggyback  250     Total Intake(mL/kg)  1438.8 (14.4)     Urine (mL/kg/hr)  420     Total Output  420     Net  +1018.8                  Physical Exam  Vitals reviewed.   Constitutional:       General: He is not in acute distress.     Appearance: Normal appearance.   HENT:      Head: Normocephalic and atraumatic.      Nose: No congestion.      Mouth/Throat:      Mouth: Mucous membranes are moist.   Eyes:      General: No scleral icterus.     Extraocular Movements: Extraocular movements intact.      Conjunctiva/sclera: Conjunctivae normal.      Pupils: Pupils are equal, round, and reactive to light.   Cardiovascular:      Rate and Rhythm: Normal rate and regular rhythm.      Pulses: Normal pulses.      Heart sounds: Murmur (systolic in nature) heard.       Pulmonary:      Effort: Pulmonary effort is normal. No respiratory distress.      Breath sounds: Normal breath sounds. No wheezing, rhonchi or rales.   Abdominal:      General: Bowel sounds are normal. There is no distension.      Palpations: Abdomen is soft.      Tenderness: There is no abdominal tenderness. There is no guarding.       Musculoskeletal:         General: Normal range of motion.      Cervical back: Normal range of motion and neck supple.   Skin:     General: Skin is warm and dry.      Findings: Bruising present.   Neurological:      General: No focal deficit present.      Mental Status: He is alert and oriented to person, place, and time. Mental status is at baseline.      Motor: No weakness.   Psychiatric:         Mood and Affect:  Mood normal.         Behavior: Behavior normal.         Thought Content: Thought content normal.         Judgment: Judgment normal.         Laboratory:  Immunosuppressants         Stop Route Frequency     tacrolimus capsule 2 mg         -- Oral 2 times daily        CBC:   Recent Labs   Lab 02/02/22  0431   WBC 5.44   RBC 2.03*   HGB 6.0*  6.0*   HCT 19.7*  19.6*      MCV 97   MCH 29.6   MCHC 30.6*     CMP:   Recent Labs   Lab 02/02/22  0431   *   CALCIUM 9.0   ALBUMIN 2.8*   PROT 5.1*   *   K 5.0   CO2 28   CL 97   BUN 35*   CREATININE 1.7*   ALKPHOS 45*   ALT 8*   AST 11   BILITOT 0.7     Coagulation:   Recent Labs   Lab 02/01/22  1716   INR 1.2     Labs within the past 24 hours have been reviewed.    Diagnostic Results:  CT Abd/Pelvis: Results for orders placed during the hospital encounter of 02/01/22    CT Abdomen Pelvis  Without Contrast    Narrative  EXAMINATION:  CT ABDOMEN PELVIS WITHOUT CONTRAST    CLINICAL HISTORY:  s/p liver transplant 26 days ago. Admit with weakness, significant anemia. r/o bleeding. covid +;    TECHNIQUE:  Low dose axial images, sagittal and coronal reformations were obtained from the lung bases to the pubic symphysis, Oral contrast was not administered.    COMPARISON:  Liver Doppler ultrasound 01/11/2022.  CT abdomen 11/10/2021.    FINDINGS:  Heart: Normal in size.  Trace pericardial effusion.  Aortic valve and coronary artery calcifications.    Lung Bases: Small right pleural effusion with subsegmental consolidation and air bronchogram involving the right lower lobe.  Minimal atelectatic changes involving the left lower lobe.    Liver: Status post liver transplant.  The hepatic parenchyma appears unremarkable.  Moderate abdominopelvic ascites that appears slightly complex and in the pelvis.    Gallbladder: Surgically absent.    Bile Ducts: No evidence of dilated ducts.    Pancreas: Multiple punctate pancreatic calcifications.    Spleen: Punctate calcified  granuloma.    Adrenals: Unremarkable.    Kidneys/ Ureters: Bilateral renal hypodensities some are consistent with simple cysts and some are too small to characterize.  0.6 cm hyperdense focus in the left kidney which is too small to characterize and may represent a hemorrhagic/proteinaceous cyst (series 2, image 99).  No hydronephrosis or hydroureter.  Bilateral renal calcifications may represent vascular calcifications and/or nonobstructing stones.    Bladder: No evidence of wall thickening.    Reproductive organs: Dystrophic prostate calcifications.    GI Tract/Mesentery: Colonic diverticulosis without evidence of diverticulitis.  No evidence of bowel obstruction or inflammation.  The appendix is visualized and appear unremarkable.  No free intraperitoneal air.    Retroperitoneum: No significant adenopathy.    Abdominal wall: There is an umbilical hernia containing omental fat.  There are bilateral fat containing inguinal hernias.  There is minimal generalized body wall edema..    Vasculature: Atherosclerotic changes of the aorta and its branches.  No aneurysmal dilatation.    Bones: No acute fracture or destructive process.  Degenerative changes of the spine.    Impression  Status post liver transplant.  New moderate abdominopelvic ascites that appears slightly complex in the pelvis.  No obvious hematocrit level to suggest hemorrhage.  Continued short-term follow-up is suggested.    Small right pleural effusion with subsegmental consolidation involving the right lower lobe.  These findings could be related to pneumonia or atelectasis.    Additional stable findings as above.    Electronically signed by resident: Toni Campoverde MD  Date:    02/01/2022  Time:    19:13    Electronically signed by: Rodrigo Temple MD  Date:    02/01/2022  Time:    19:45  XR Chest: Results for orders placed during the hospital encounter of 02/01/22    X-Ray Chest 1 View    Narrative  EXAMINATION:  XR CHEST 1 VIEW    CLINICAL  HISTORY:  covid;    TECHNIQUE:  Single frontal view of the chest was performed.    COMPARISON:  01/13/2022    FINDINGS:  The heart size is upper normal.  Mediastinum shows aortic atherosclerosis.  Lungs are expanded and clear.  No acute lung consolidation or pleural fluid is detected.  He may have an old fracture of the right 5th rib.    Impression  No acute cardiopulmonary disease      Electronically signed by: Heriberto Hopkins MD  Date:    02/01/2022  Time:    15:26

## 2022-02-03 LAB
ALBUMIN SERPL BCP-MCNC: 2.9 G/DL (ref 3.5–5.2)
ALP SERPL-CCNC: 48 U/L (ref 55–135)
ALT SERPL W/O P-5'-P-CCNC: 10 U/L (ref 10–44)
ANION GAP SERPL CALC-SCNC: 8 MMOL/L (ref 8–16)
APPEARANCE FLD: NORMAL
AST SERPL-CCNC: 11 U/L (ref 10–40)
BASOPHILS # BLD AUTO: 0.01 K/UL (ref 0–0.2)
BASOPHILS # BLD AUTO: 0.02 K/UL (ref 0–0.2)
BASOPHILS NFR BLD: 0.3 % (ref 0–1.9)
BASOPHILS NFR BLD: 0.3 % (ref 0–1.9)
BASOPHILS NFR BLD: 0.4 % (ref 0–1.9)
BASOPHILS NFR BLD: 0.4 % (ref 0–1.9)
BILIRUB FLD-MCNC: 2.4 MG/DL
BILIRUB SERPL-MCNC: 0.7 MG/DL (ref 0.1–1)
BODY FLD TYPE: NORMAL
BODY FLUID COMMENTS: NORMAL
BODY FLUID SOURCE, BILIRUBIN: NORMAL
BUN SERPL-MCNC: 32 MG/DL (ref 8–23)
CALCIUM SERPL-MCNC: 8.7 MG/DL (ref 8.7–10.5)
CHLORIDE SERPL-SCNC: 100 MMOL/L (ref 95–110)
CO2 SERPL-SCNC: 27 MMOL/L (ref 23–29)
COLOR FLD: NORMAL
CREAT SERPL-MCNC: 1.3 MG/DL (ref 0.5–1.4)
DIFFERENTIAL METHOD: ABNORMAL
EOSINOPHIL # BLD AUTO: 0 K/UL (ref 0–0.5)
EOSINOPHIL NFR BLD: 0.5 % (ref 0–8)
EOSINOPHIL NFR BLD: 0.6 % (ref 0–8)
ERYTHROCYTE [DISTWIDTH] IN BLOOD BY AUTOMATED COUNT: 16.2 % (ref 11.5–14.5)
ERYTHROCYTE [DISTWIDTH] IN BLOOD BY AUTOMATED COUNT: 16.3 % (ref 11.5–14.5)
ERYTHROCYTE [DISTWIDTH] IN BLOOD BY AUTOMATED COUNT: 16.4 % (ref 11.5–14.5)
ERYTHROCYTE [DISTWIDTH] IN BLOOD BY AUTOMATED COUNT: 16.5 % (ref 11.5–14.5)
EST. GFR  (AFRICAN AMERICAN): >60 ML/MIN/1.73 M^2
EST. GFR  (NON AFRICAN AMERICAN): 56.1 ML/MIN/1.73 M^2
GLUCOSE SERPL-MCNC: 169 MG/DL (ref 70–110)
HCT VFR BLD AUTO: 23.9 % (ref 40–54)
HCT VFR BLD AUTO: 24 % (ref 40–54)
HCT VFR BLD AUTO: 25.6 % (ref 40–54)
HCT VFR BLD AUTO: 27.9 % (ref 40–54)
HGB BLD-MCNC: 7.5 G/DL (ref 14–18)
HGB BLD-MCNC: 7.5 G/DL (ref 14–18)
HGB BLD-MCNC: 8.4 G/DL (ref 14–18)
HGB BLD-MCNC: 8.7 G/DL (ref 14–18)
IMM GRANULOCYTES # BLD AUTO: 0.05 K/UL (ref 0–0.04)
IMM GRANULOCYTES # BLD AUTO: 0.09 K/UL (ref 0–0.04)
IMM GRANULOCYTES # BLD AUTO: 0.09 K/UL (ref 0–0.04)
IMM GRANULOCYTES # BLD AUTO: 0.11 K/UL (ref 0–0.04)
IMM GRANULOCYTES NFR BLD AUTO: 1.3 % (ref 0–0.5)
IMM GRANULOCYTES NFR BLD AUTO: 1.4 % (ref 0–0.5)
IMM GRANULOCYTES NFR BLD AUTO: 1.7 % (ref 0–0.5)
IMM GRANULOCYTES NFR BLD AUTO: 2 % (ref 0–0.5)
LYMPHOCYTES # BLD AUTO: 0.2 K/UL (ref 1–4.8)
LYMPHOCYTES # BLD AUTO: 0.3 K/UL (ref 1–4.8)
LYMPHOCYTES # BLD AUTO: 0.4 K/UL (ref 1–4.8)
LYMPHOCYTES # BLD AUTO: 0.4 K/UL (ref 1–4.8)
LYMPHOCYTES NFR BLD: 4.6 % (ref 18–48)
LYMPHOCYTES NFR BLD: 6.3 % (ref 18–48)
LYMPHOCYTES NFR BLD: 6.5 % (ref 18–48)
LYMPHOCYTES NFR BLD: 7.6 % (ref 18–48)
LYMPHOCYTES NFR FLD MANUAL: 9 %
MAGNESIUM SERPL-MCNC: 1.6 MG/DL (ref 1.6–2.6)
MCH RBC QN AUTO: 29.3 PG (ref 27–31)
MCH RBC QN AUTO: 29.4 PG (ref 27–31)
MCH RBC QN AUTO: 29.5 PG (ref 27–31)
MCH RBC QN AUTO: 29.7 PG (ref 27–31)
MCHC RBC AUTO-ENTMCNC: 31.2 G/DL (ref 32–36)
MCHC RBC AUTO-ENTMCNC: 31.3 G/DL (ref 32–36)
MCHC RBC AUTO-ENTMCNC: 31.4 G/DL (ref 32–36)
MCHC RBC AUTO-ENTMCNC: 32.8 G/DL (ref 32–36)
MCV RBC AUTO: 91 FL (ref 82–98)
MCV RBC AUTO: 93 FL (ref 82–98)
MCV RBC AUTO: 94 FL (ref 82–98)
MCV RBC AUTO: 95 FL (ref 82–98)
MONOCYTES # BLD AUTO: 0.4 K/UL (ref 0.3–1)
MONOCYTES # BLD AUTO: 0.5 K/UL (ref 0.3–1)
MONOCYTES NFR BLD: 7.1 % (ref 4–15)
MONOCYTES NFR BLD: 8.7 % (ref 4–15)
MONOCYTES NFR BLD: 9.5 % (ref 4–15)
MONOCYTES NFR BLD: 9.6 % (ref 4–15)
MONOS+MACROS NFR FLD MANUAL: 16 %
NEUTROPHILS # BLD AUTO: 3.3 K/UL (ref 1.8–7.7)
NEUTROPHILS # BLD AUTO: 4.3 K/UL (ref 1.8–7.7)
NEUTROPHILS # BLD AUTO: 4.5 K/UL (ref 1.8–7.7)
NEUTROPHILS # BLD AUTO: 5.5 K/UL (ref 1.8–7.7)
NEUTROPHILS NFR BLD: 79.9 % (ref 38–73)
NEUTROPHILS NFR BLD: 82.3 % (ref 38–73)
NEUTROPHILS NFR BLD: 83.8 % (ref 38–73)
NEUTROPHILS NFR BLD: 84.2 % (ref 38–73)
NEUTROPHILS NFR FLD MANUAL: 75 %
NRBC BLD-RTO: 1 /100 WBC
OB PNL STL: NEGATIVE
PLATELET # BLD AUTO: 166 K/UL (ref 150–450)
PLATELET # BLD AUTO: 197 K/UL (ref 150–450)
PLATELET # BLD AUTO: 204 K/UL (ref 150–450)
PLATELET # BLD AUTO: 253 K/UL (ref 150–450)
PMV BLD AUTO: 10.3 FL (ref 9.2–12.9)
PMV BLD AUTO: 9.5 FL (ref 9.2–12.9)
PMV BLD AUTO: 9.7 FL (ref 9.2–12.9)
PMV BLD AUTO: 9.8 FL (ref 9.2–12.9)
POCT GLUCOSE: 148 MG/DL (ref 70–110)
POCT GLUCOSE: 164 MG/DL (ref 70–110)
POCT GLUCOSE: 165 MG/DL (ref 70–110)
POCT GLUCOSE: 167 MG/DL (ref 70–110)
POCT GLUCOSE: 183 MG/DL (ref 70–110)
POTASSIUM SERPL-SCNC: 4.7 MMOL/L (ref 3.5–5.1)
PROT SERPL-MCNC: 5.2 G/DL (ref 6–8.4)
RBC # BLD AUTO: 2.55 M/UL (ref 4.6–6.2)
RBC # BLD AUTO: 2.56 M/UL (ref 4.6–6.2)
RBC # BLD AUTO: 2.83 M/UL (ref 4.6–6.2)
RBC # BLD AUTO: 2.95 M/UL (ref 4.6–6.2)
SODIUM SERPL-SCNC: 135 MMOL/L (ref 136–145)
TACROLIMUS BLD-MCNC: 9.2 NG/ML (ref 5–15)
WBC # BLD AUTO: 3.91 K/UL (ref 3.9–12.7)
WBC # BLD AUTO: 5.2 K/UL (ref 3.9–12.7)
WBC # BLD AUTO: 5.64 K/UL (ref 3.9–12.7)
WBC # BLD AUTO: 6.58 K/UL (ref 3.9–12.7)
WBC # FLD: 686 /CU MM

## 2022-02-03 PROCEDURE — 85025 COMPLETE CBC W/AUTO DIFF WBC: CPT | Mod: 91 | Performed by: PHYSICIAN ASSISTANT

## 2022-02-03 PROCEDURE — 20600001 HC STEP DOWN PRIVATE ROOM

## 2022-02-03 PROCEDURE — 36415 COLL VENOUS BLD VENIPUNCTURE: CPT | Performed by: PHYSICIAN ASSISTANT

## 2022-02-03 PROCEDURE — 83735 ASSAY OF MAGNESIUM: CPT

## 2022-02-03 PROCEDURE — 99233 SBSQ HOSP IP/OBS HIGH 50: CPT | Mod: ,,,

## 2022-02-03 PROCEDURE — 80053 COMPREHEN METABOLIC PANEL: CPT

## 2022-02-03 PROCEDURE — 87102 FUNGUS ISOLATION CULTURE: CPT

## 2022-02-03 PROCEDURE — 25000003 PHARM REV CODE 250: Performed by: NURSE PRACTITIONER

## 2022-02-03 PROCEDURE — 99233 PR SUBSEQUENT HOSPITAL CARE,LEVL III: ICD-10-PCS | Mod: ,,,

## 2022-02-03 PROCEDURE — 89051 BODY FLUID CELL COUNT: CPT

## 2022-02-03 PROCEDURE — 87070 CULTURE OTHR SPECIMN AEROBIC: CPT

## 2022-02-03 PROCEDURE — 87075 CULTR BACTERIA EXCEPT BLOOD: CPT

## 2022-02-03 PROCEDURE — 63600175 PHARM REV CODE 636 W HCPCS

## 2022-02-03 PROCEDURE — 87205 SMEAR GRAM STAIN: CPT

## 2022-02-03 PROCEDURE — 27000207 HC ISOLATION

## 2022-02-03 PROCEDURE — 82247 BILIRUBIN TOTAL: CPT

## 2022-02-03 PROCEDURE — 36415 COLL VENOUS BLD VENIPUNCTURE: CPT

## 2022-02-03 PROCEDURE — 25000003 PHARM REV CODE 250: Performed by: PHYSICIAN ASSISTANT

## 2022-02-03 PROCEDURE — 25000003 PHARM REV CODE 250

## 2022-02-03 PROCEDURE — 63600175 PHARM REV CODE 636 W HCPCS: Performed by: TRANSPLANT SURGERY

## 2022-02-03 PROCEDURE — 94761 N-INVAS EAR/PLS OXIMETRY MLT: CPT

## 2022-02-03 PROCEDURE — 80197 ASSAY OF TACROLIMUS: CPT

## 2022-02-03 RX ORDER — MAGNESIUM SULFATE HEPTAHYDRATE 40 MG/ML
2 INJECTION, SOLUTION INTRAVENOUS ONCE
Status: DISCONTINUED | OUTPATIENT
Start: 2022-02-03 | End: 2022-02-04

## 2022-02-03 RX ORDER — TACROLIMUS 1 MG/1
1 CAPSULE ORAL 2 TIMES DAILY
Status: DISCONTINUED | OUTPATIENT
Start: 2022-02-03 | End: 2022-02-06

## 2022-02-03 RX ORDER — CEFEPIME HYDROCHLORIDE 1 G/50ML
2 INJECTION, SOLUTION INTRAVENOUS
Status: DISCONTINUED | OUTPATIENT
Start: 2022-02-03 | End: 2022-02-04

## 2022-02-03 RX ORDER — CEFEPIME HYDROCHLORIDE 1 G/50ML
2 INJECTION, SOLUTION INTRAVENOUS
Status: DISCONTINUED | OUTPATIENT
Start: 2022-02-03 | End: 2022-02-03

## 2022-02-03 RX ADMIN — AMIODARONE HYDROCHLORIDE 200 MG: 200 TABLET ORAL at 08:02

## 2022-02-03 RX ADMIN — INSULIN ASPART 6 UNITS: 100 INJECTION, SOLUTION INTRAVENOUS; SUBCUTANEOUS at 08:02

## 2022-02-03 RX ADMIN — TAMSULOSIN HYDROCHLORIDE 0.4 MG: 0.4 CAPSULE ORAL at 09:02

## 2022-02-03 RX ADMIN — Medication 400 MG: at 08:02

## 2022-02-03 RX ADMIN — PRAVASTATIN SODIUM 20 MG: 20 TABLET ORAL at 08:02

## 2022-02-03 RX ADMIN — TACROLIMUS 2 MG: 1 CAPSULE ORAL at 08:02

## 2022-02-03 RX ADMIN — REMDESIVIR 100 MG: 100 INJECTION, POWDER, LYOPHILIZED, FOR SOLUTION INTRAVENOUS at 10:02

## 2022-02-03 RX ADMIN — Medication 400 MG: at 09:02

## 2022-02-03 RX ADMIN — DOCUSATE SODIUM 50 MG: 50 CAPSULE, LIQUID FILLED ORAL at 08:02

## 2022-02-03 RX ADMIN — DOCUSATE SODIUM 50 MG: 50 CAPSULE, LIQUID FILLED ORAL at 09:02

## 2022-02-03 RX ADMIN — SERTRALINE HYDROCHLORIDE 25 MG: 25 TABLET ORAL at 08:02

## 2022-02-03 RX ADMIN — PREDNISONE 5 MG: 5 TABLET ORAL at 08:02

## 2022-02-03 RX ADMIN — VALGANCICLOVIR 450 MG: 450 TABLET, FILM COATED ORAL at 08:02

## 2022-02-03 RX ADMIN — TACROLIMUS 1 MG: 1 CAPSULE ORAL at 05:02

## 2022-02-03 RX ADMIN — SULFAMETHOXAZOLE AND TRIMETHOPRIM 1 TABLET: 400; 80 TABLET ORAL at 08:02

## 2022-02-03 RX ADMIN — CEFEPIME HYDROCHLORIDE 2 G: 2 INJECTION, SOLUTION INTRAVENOUS at 11:02

## 2022-02-03 RX ADMIN — AMLODIPINE BESYLATE 10 MG: 10 TABLET ORAL at 08:02

## 2022-02-03 RX ADMIN — PANTOPRAZOLE SODIUM 40 MG: 40 TABLET, DELAYED RELEASE ORAL at 08:02

## 2022-02-03 NOTE — PROCEDURES
Radiology Post-Procedure Note    Pre Op Diagnosis: Ascites  Post Op Diagnosis: Same    Procedure: Ultrasound Guided Paracentesis    Procedure performed by: Jes Peñaloza PA-C    Written Informed Consent Obtained: Yes  Specimen Removed: YES max  Estimated Blood Loss: Minimal    Findings:   Successful paracentesis.  RLQ.  Albumin administered PRN per protocol.    Patient tolerated procedure well.    Jes Peñaloza PA-C  Interventional Radiology  Clinic [phone]

## 2022-02-03 NOTE — ASSESSMENT & PLAN NOTE
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 Patient to follow up with medical oncology for possible further treatment. Appointment scheduled for 1/26 cancelled due to testing positive for COVID19 1/26.

## 2022-02-03 NOTE — H&P
Inpatient Radiology Pre-procedure Note    History of Present Illness:  Tej Purdy is a 66 y.o. male who presents for ultrasound guided paracentesis.  Admission H&P reviewed.  Past Medical History:   Diagnosis Date    Atrial fibrillation     Cirrhosis 11/23/2020    Diabetes mellitus, type 2     Diabetic neuropathy 11/24/2020    Disorder of kidney and ureter     HTN (hypertension) 11/23/2020    Hyperlipidemia 11/23/2020    Kidney stones 11/23/2020    S/p lithotripsy 2020    Leukocytosis 1/15/2021    Liver mass 11/23/2020    CASITLLO (nonalcoholic steatohepatitis) 11/23/2020    PAD (peripheral artery disease)     Portal hypertension 11/23/2020    Skin cancer 11/23/2020     Past Surgical History:   Procedure Laterality Date    COLONOSCOPY  10/2020    ESOPHAGOGASTRODUODENOSCOPY  10/2020    ESOPHAGOGASTRODUODENOSCOPY N/A 7/1/2021    Procedure: EGD (ESOPHAGOGASTRODUODENOSCOPY);  Surgeon: Jovan David MD;  Location: [facility] (4TH FLR);  Service: Endoscopy;  Laterality: N/A;  HCC. Listed for liver transplant. EGD for variceal surveillance.  cardiac clearance and blood thinenr approval received, see telephone encounter 6/23/21-BB  fully vaccinated-BB  labs same day of procedure-BB    EXCISION OF HYDROCELE Right     hemorroid surgery      hemorroidectomy      KIDNEY STONE SURGERY      LEFT HEART CATHETERIZATION N/A 1/27/2021    Procedure: Left heart cath;  Surgeon: Kris Shelton MD;  Location: [facility] CATH LAB;  Service: Cardiology;  Laterality: N/A;    liver mass removal      LIVER TRANSPLANT N/A 1/6/2022    Procedure: TRANSPLANT, LIVER;  Surgeon: Lizet Garcia MD;  Location: [facility] 2ND FLR;  Service: Transplant;  Laterality: N/A;    RIGHT HEART CATHETERIZATION Right 5/7/2021    Procedure: INSERTION, CATHETER, RIGHT HEART;  Surgeon: Jaden Schuster MD;  Location: [facility] CATH LAB;  Service: Cardiology;  Laterality: Right;    TREATMENT OF CARDIAC ARRHYTHMIA N/A 5/19/2021    Procedure:  CARDIOVERSION;  Surgeon: Didier Tilley MD;  Location: [facility] EP LAB;  Service: Cardiology;  Laterality: N/A;  a fib, dccv/johny, mac, dm. sscu    TREATMENT OF CARDIAC ARRHYTHMIA N/A 5/31/2021    Procedure: CARDIOVERSION;  Surgeon: Daniel Arambula MD;  Location: [facility] EP LAB;  Service: Cardiology;  Laterality: N/A;  afib, DCCV, anest., DM, 3 prep    TREATMENT OF CARDIAC ARRHYTHMIA N/A 7/7/2021    Procedure: Cardioversion or Defibrillation;  Surgeon: Didier Tilley MD;  Location: [facility] EP LAB;  Service: Cardiology;  Laterality: N/A;  AF, JOHNY (cancel if complaint), DCCV, MAC, DM, 3 Prep    TREATMENT OF CARDIAC ARRHYTHMIA N/A 7/27/2021    Procedure: Cardioversion or Defibrillation;  Surgeon: Didier Tilley MD;  Location: [facility] EP LAB;  Service: Cardiology;  Laterality: N/A;  AF, JOHNY (cx if complaint), DCCV, MAC, DM, 3 Prep       Review of Systems:   As documented in primary team H&P    Home Meds:   Prior to Admission medications    Medication Sig Start Date End Date Taking? Authorizing Provider   amiodarone (PACERONE) 200 MG Tab Take 1 tablet (200 mg total) by mouth once daily. 1/13/22   Werner Zamarripa MD   amLODIPine (NORVASC) 10 MG tablet Take 1 tablet (10 mg total) by mouth once daily. 1/14/22 1/14/23  Werner Zamarripa MD   apixaban (ELIQUIS) 5 mg Tab Take 1 tablet (5 mg total) by mouth 2 (two) times daily. 1/7/22   Leland Munoz MD   aspirin (ECOTRIN) 81 MG EC tablet Take 81 mg by mouth once daily.    Historical Provider   blood sugar diagnostic Strp To check BG 2 times daily, to use with insurance preferred meter 7/9/21   Carlos Argueta MD   blood-glucose meter kit To check BG 2 times daily, to use with insurance preferred meter 7/16/21   Carlos Argueta MD   calcium carbonate (TUMS) 200 mg calcium (500 mg) chewable tablet Take 1 tablet (500 mg total) by mouth 3 (three) times daily as needed for Heartburn. 1/13/22 1/13/23  Werner Zamarripa MD   calcium carbonate-vitamin D3 600 mg-20 mcg (800 unit) Tab Take  "1 tablet by mouth once daily. 1/13/22   Werner Zamarripa MD   gabapentin (NEURONTIN) 300 MG capsule Take 1 capsule (300 mg total) by mouth 3 (three) times daily as needed (neuropathy). 1/13/22   Werner Zamarripa MD   insulin (LANTUS SOLOSTAR U-100 INSULIN) glargine 100 units/mL (3mL) SubQ pen Inject 24 Units into the skin every evening. 1/13/22   Werner Zamarripa MD   insulin lispro (HUMALOG KWIKPEN INSULIN) 100 unit/mL pen Inject 6 Units into the skin 3 (three) times daily. Plus sliding scale, MDD: 48 units 1/13/22 1/13/23  Werner Zamarripa MD   lancets [facility] To check BG 2 times daily, to use with insurance preferred meter 7/9/21   Carlos Argueta MD   lovastatin (MEVACOR) 20 MG tablet  1/20/22   Historical Provider   multivitamin capsule Take 1 capsule by mouth once daily.    Historical Provider   mycophenolate (CELLCEPT) 250 mg Cap Take 4 capsules (1,000 mg total) by mouth 2 (two) times daily. 1/7/22   Leland Munoz MD   nystatin (MYCOSTATIN) 100,000 unit/mL suspension Take 5 mLs (500,000 Units total) by mouth 3 (three) times daily after meals. STOP 1/26/22 1/7/22   Leland Munoz MD   omega-3 fatty acids/fish oil (FISH OIL-OMEGA-3 FATTY ACIDS) 300-1,000 mg capsule Take 1 capsule by mouth once daily.     Historical Provider   oxyCODONE (ROXICODONE) 10 mg Tab immediate release tablet Take 1 tablet (10 mg total) by mouth every 4 (four) hours as needed for Pain. 1/13/22   Blanche Shafer PA-C   pantoprazole (PROTONIX) 40 MG tablet Take 1 tablet (40 mg total) by mouth once daily. 1/13/22   Werner Zamarripa MD   pen needle, diabetic (BD ULTRA-FINE GÉNESIS PEN NEEDLE) 32 gauge x 5/32" Ndle Use with insulin once daily 11/17/21   Carlos Argueta MD   predniSONE (DELTASONE) 5 MG tablet Take by mouth daily:  20mg 1/12-1/18, 15mg 1/19-1/25, 10mg 1/26-2/1, 5mg 2/2-2/8, STOP 2/9/22 1/7/22   Leland Munoz MD   sertraline (ZOLOFT) 25 MG tablet Take 1 tablet (25 mg total) by mouth once daily. 1/13/22   Werner Zamarripa, " MD   sulfamethoxazole-trimethoprim 400-80mg (BACTRIM,SEPTRA) 400-80 mg per tablet Take 1 tablet by mouth every morning. STOP 7/7/22 1/13/22   Leland Munoz MD   tacrolimus (PROGRAF) 1 MG Cap Take 2 capsules (2 mg total) by mouth every 12 (twelve) hours. 1/18/22   Sagar Gauthier MD   tamsulosin (FLOMAX) 0.4 mg Cap Take 1 capsule (0.4 mg total) by mouth every evening. 1/13/22 1/13/23  Werner Zamarripa MD   valGANciclovir (VALCYTE) 450 mg Tab Take 1 tablet (450 mg total) by mouth once daily. STOP 7/7/22 1/16/22 1/16/23  Leland Munoz MD     Scheduled Meds:    amiodarone  200 mg Oral Daily    amLODIPine  10 mg Oral Daily    bisacodyL  10 mg Rectal Daily    docusate sodium  50 mg Oral BID    insulin aspart U-100  6 Units Subcutaneous TIDWM    insulin detemir U-100  24 Units Subcutaneous QHS    magnesium oxide  400 mg Oral BID    magnesium sulfate IVPB  2 g Intravenous Once    pantoprazole  40 mg Oral Daily    polyethylene glycol  17 g Oral BID    pravastatin  20 mg Oral Daily    predniSONE  5 mg Oral Daily    sertraline  25 mg Oral Daily    sulfamethoxazole-trimethoprim 400-80mg  1 tablet Oral QAM    tacrolimus  1 mg Oral Daily PM    tacrolimus  2 mg Oral Daily AM    tamsulosin  0.4 mg Oral QHS    valGANciclovir  450 mg Oral Daily     Continuous Infusions:   PRN Meds:sodium chloride, sodium chloride, acetaminophen, bisacodyL, dextrose 50%, dextrose 50%, gabapentin, glucagon (human recombinant), glucose, glucose, insulin aspart U-100, melatonin, ondansetron, sodium chloride 0.9%  Anticoagulants/Antiplatelets: aspirin and eliquis    Allergies:   Review of patient's allergies indicates:   Allergen Reactions    Bee pollens Swelling     BEE STINGS swells body     Sedation Hx: have not been any systemic reactions    Vitals:  Temp: 98.1 °F (36.7 °C) (02/03/22 0723)  Pulse: 83 (02/03/22 0955)  Resp: 16 (02/03/22 0723)  BP: (!) 122/57 (02/03/22 0955)  SpO2: (!) 92 % (02/03/22 0723)     Physical  Exam:  ASA: 3  Mallampati: n/a    General: no acute distress  Mental Status: alert and oriented to person, place and time  HEENT: normocephalic, atraumatic  Chest: unlabored breathing  Heart: regular heart rate  Abdomen: nondistended  Extremity: moves all extremities    Plan: ultrasound guided paracentesis  Sedation Plan: local    Jes Peñaloza PA-C  Interventional Radiology  Clinic [phone]

## 2022-02-03 NOTE — ASSESSMENT & PLAN NOTE
- hgb 5.5 on admission   - ASA and elliquis held  - 2 units PRBCs transfused 2/2  - 1st unit with hgb response to 6   - 2nd unit response hgb ~8.5  - RBC studies with some type of potential hemolytic process, retics elevated   - para with 4.5L max fluid out  - CMV pending, occult blood stool negative  - monitor closely

## 2022-02-03 NOTE — PROGRESS NOTES
[facility] - Telemetry Stepdown  Liver Transplant  Progress Note    Patient Name: Tej Purdy  MRN: [id number]  Admission Date: 2/1/2022  Hospital Length of Stay: 2 days  Code Status: Full Code  Primary Care Provider: Thais Maxwell MD  Post-Operative Day: 28    ORGAN:   LIVER  Disease Etiology: Primary Liver Malignancy: Hepatoma (HCC) and Cirrhosis  Donor Type:   Donation after Brain Death  CDC High Risk:   No  Donor CMV Status:   Donor CMV Status: Positive  Donor HBcAB:   Negative  Donor HCV Status:   Negative  Donor HBV JAYLIN: Negative  Donor HCV JAYLIN: Negative  Whole or Partial: Whole Liver  Biliary Anastomosis: End to End  Arterial Anatomy: Replaced Left Hepatic from Left Gastric  Subjective:     History of Present Illness:  Mr. 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 Operation notable for replaced LHA, reconstructed. Post op course without complication. Patient with history of afib followed by cardiology who recommends continuing amiodarone with elliquis anticoagulation. 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 Patient to follow up with medical oncology for possible further treatment. Appointment scheduled for 1/26 cancelled due to testing positive for COVID19 1/26. He had an infusion for COVID on 1/27 without complication. Patient is being admitted for COVID treatment and monitoring. He was unable to make his lab appointments due to weakness. Patient reports generalized weakness, especially with activity, as well as decreased appetite, light headedness, and constipation. Denies cough, SOB, CP, palpitations, NVD, changes in urinary function. Due to recent transplant, infectious workup started on admission. Will treat COVID with remdesivir. Cellcept held for infection. Cardiac workup in process as well.       Hospital Course:  Mr. Purdy was admitted 2/1 for increasing weakness. Labs on admission revealed a h/h of 5.5/17.9. Has received 2 units of PRBCs as  of 2/2, h/h responded appropriately. ASA and elliquis held. CT abd/pelvis was attained to assess for hemorrhage. CT revealed new moderate abdominopelvic ascites that appears slightly complex in the pelvis. Discussed CT with Dr. Lozada, unsure if this is where blood loss has come from. Para 2/3 with 4.5L of max fluid drained, labs pending. Occult blood stool negative. Cardiac w/u without acute findings, infectious w/u with BCs NGTD, clean UA. CXR with no acute cardiopulmonary disease.     Interval History   Patient not feeling great this AM. Very light headed with walking despite 2 units of blood. Episodes of nausea overnight. Continuing investigation into anemia. Repeat CBC this afternoon. Occult blood stool negative. Para 2/3 with 4.5L of max colored fluid out, labs pending. Replaced electrolytes. VSS, labs otherwise stable. CMV pending.       Scheduled Meds:   amiodarone  200 mg Oral Daily    amLODIPine  10 mg Oral Daily    bisacodyL  10 mg Rectal Daily    docusate sodium  50 mg Oral BID    insulin aspart U-100  6 Units Subcutaneous TIDWM    insulin detemir U-100  24 Units Subcutaneous QHS    magnesium oxide  400 mg Oral BID    magnesium sulfate IVPB  2 g Intravenous Once    pantoprazole  40 mg Oral Daily    polyethylene glycol  17 g Oral BID    pravastatin  20 mg Oral Daily    predniSONE  5 mg Oral Daily    sertraline  25 mg Oral Daily    sulfamethoxazole-trimethoprim 400-80mg  1 tablet Oral QAM    tacrolimus  1 mg Oral BID    tamsulosin  0.4 mg Oral QHS    valGANciclovir  450 mg Oral Daily     Continuous Infusions:  PRN Meds:sodium chloride, sodium chloride, acetaminophen, bisacodyL, dextrose 50%, dextrose 50%, gabapentin, glucagon (human recombinant), glucose, glucose, insulin aspart U-100, melatonin, ondansetron, sodium chloride 0.9%    Review of Systems   Constitutional: Positive for activity change (low energy), appetite change (decreased) and fatigue. Negative for chills and fever.    HENT: Negative for congestion, sinus pressure and sore throat.    Respiratory: Negative for cough, chest tightness, shortness of breath and wheezing.    Cardiovascular: Negative for chest pain, palpitations and leg swelling.   Gastrointestinal: Positive for abdominal distention, abdominal pain, constipation and nausea. Negative for diarrhea and vomiting.   Genitourinary: Negative for decreased urine volume, difficulty urinating, dysuria and frequency.   Musculoskeletal: Negative for arthralgias and back pain.   Skin: Negative for wound.   Allergic/Immunologic: Positive for immunocompromised state.   Neurological: Positive for weakness and light-headedness. Negative for dizziness, syncope and headaches.   Hematological: Bruises/bleeds easily.     Objective:     Vital Signs (Most Recent):  Temp: 98.1 °F (36.7 °C) (02/03/22 1114)  Pulse: 73 (02/03/22 1150)  Resp: 16 (02/03/22 1150)  BP: 133/63 (02/03/22 1150)  SpO2: (!) 94 % (02/03/22 1150) Vital Signs (24h Range):  Temp:  [97.8 °F (36.6 °C)-98.1 °F (36.7 °C)] 98.1 °F (36.7 °C)  Pulse:  [69-95] 73  Resp:  [16-22] 16  SpO2:  [91 %-98 %] 94 %  BP: (121-138)/(57-70) 133/63     Weight: 99.8 kg (220 lb 0.3 oz)  Body mass index is 29.84 kg/m².    Intake/Output - Last 3 Shifts       02/01 0700  02/02 0659 02/02 0700 02/03 0659 02/03 0700  02/04 0659    P.O. 550 1000     I.V. (mL/kg)  49.8 (0.5)     Blood 338.8 287.5     Other 300      IV Piggyback 250 247.3     Total Intake(mL/kg) 1438.8 (14.4) 1584.6 (15.9)     Urine (mL/kg/hr) 420 200 (0.1)     Other   4450    Total Output     Net +1018.8 +1384.6 -4450                 Physical Exam  Vitals and nursing note reviewed.   Constitutional:       General: He is not in acute distress.     Appearance: Normal appearance.   HENT:      Head: Normocephalic and atraumatic.      Nose: No congestion.      Mouth/Throat:      Mouth: Mucous membranes are moist.   Eyes:      General: No scleral icterus.     Extraocular  Movements: Extraocular movements intact.      Conjunctiva/sclera: Conjunctivae normal.      Pupils: Pupils are equal, round, and reactive to light.   Cardiovascular:      Rate and Rhythm: Normal rate and regular rhythm.      Pulses: Normal pulses.      Heart sounds: Murmur (systolic in nature) heard.       Pulmonary:      Effort: Pulmonary effort is normal. No respiratory distress.      Breath sounds: Normal breath sounds. No wheezing, rhonchi or rales.   Abdominal:      General: Bowel sounds are normal. There is no distension.      Palpations: Abdomen is soft.      Tenderness: There is no abdominal tenderness. There is no guarding.       Musculoskeletal:         General: Normal range of motion.      Cervical back: Normal range of motion and neck supple.      Right lower leg: No edema.      Left lower leg: No edema.   Skin:     General: Skin is warm and dry.      Findings: Bruising present.   Neurological:      General: No focal deficit present.      Mental Status: He is alert and oriented to person, place, and time. Mental status is at baseline.      Motor: No weakness.   Psychiatric:         Mood and Affect: Mood normal.         Behavior: Behavior normal.         Thought Content: Thought content normal.         Judgment: Judgment normal.         Laboratory:  Immunosuppressants         Stop Route Frequency     tacrolimus capsule 1 mg         -- Oral 2 times daily        CBC:   Recent Labs   Lab 02/03/22  1333   WBC 3.91   RBC 2.56*   HGB 7.5*   HCT 23.9*      MCV 93   MCH 29.3   MCHC 31.4*     CMP:   Recent Labs   Lab 02/03/22  0551   *   CALCIUM 8.7   ALBUMIN 2.9*   PROT 5.2*   *   K 4.7   CO2 27      BUN 32*   CREATININE 1.3   ALKPHOS 48*   ALT 10   AST 11   BILITOT 0.7     Labs within the past 24 hours have been reviewed.    Diagnostic Results:  Reviewed or pending     Assessment/Plan:     * Anemia due to unknown mechanism  - hgb 5.5 on admission   - ASA and elliquis held  - 2 units  PRBCs transfused 2/2  - 1st unit with hgb response to 6   - 2nd unit response hgb ~8.5  - RBC studies with some type of potential hemolytic process, retics elevated   - para with 4.5L max fluid out  - CMV pending, occult blood stool negative  - monitor closely     Type 2 diabetes mellitus with hyperglycemia  - endocrine consulted, appreciate help    Weakness  - patient with covid 1/26   - missed labs due to weakness   - will treat covid  - w/u for other potential infectious process negative thus far   - EKG with NSR, troponin/bnp negative, cxr stable   - h/h low, see anemia of unknown origin       Long-term use of immunosuppressant medication  - see prophylactic immunotherapy       COVID-19  - patient unable to obtain labs because of weakness   - tested positive 1/26  - infusion 1/27 without complication   - ID consulted for treatment recs - started remdesivir 2/1-2/3, continuing home PO pred   - Sats 92-94% on RA, CXR without evidence of cardiopulmonary disease  - cellcept held       Anemia of chronic disease  - h/h decreased  - see anemia of unknown mechanism   - continue to monitor with CBC   - CT without definite bleed and occult blood stool negative   - CMV pending, para results pending     Prophylactic immunotherapy  - maintanence on prograf, cellcept, prednisone   - prograf levels monitored daily for toxicity, therapeutic levels   - cellcept held for covid infection     At risk for opportunistic infections  - continue OI prophylaxis per protocol       S/P liver transplant  - 67 y/o man s/p DBD LTx on 1/6/21 for 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 Patient to follow up with medical oncology for possible further treatment. Appointment scheduled for 1/26 cancelled due to testing positive for COVID19 1/26.    Persistent atrial fibrillation  - continue home  amiodarone   - holding home elliquis for decreased h/h  - admit EKG NSR    Type 2 diabetes mellitus  - monitor bg closely   - endocrine consulted, appreciate help    Diabetic neuropathy  - continuing home gabapentin      Hyperlipidemia  - continuing home statin        VTE Risk Mitigation (From admission, onward)         Ordered     IP VTE HIGH RISK PATIENT  Once         02/01/22 1449     Place sequential compression device  Until discontinued         02/01/22 1449                The patients clinical status was discussed at multidisplinary rounds, involving transplant surgery, transplant medicine, pharmacy, nursing, nutrition, and social work    Discharge Planning:  PharmD review: will need endocrine follow up ( 1/24/22)  not yet stable for dc    Blanche Parker PA-C  Liver Transplant  [facility] - Tele[facility] Step[facility]

## 2022-02-03 NOTE — CARE UPDATE
"BG goal 140 -180   Diet NPO       BG stable while on current SQ insulin regimen, which is comparable to home insulin regimen. Paracentesis performed today. Endo will continue to follow, and manage glycemic control while inpatient.     - Continue Levemir 24 units HS.   - Continue Novolog 6 units TIDWM.   - Low Dose SQ Insulin Correction Scale.  - BG Monitoring AC/HS     ** Please call Endocrine for any BG related issues **  ** Please notify Endocrine for any change and/or advance in diet**  Lab Results   Component Value Date    HGBA1C 5.8 (H) 01/05/2022       Discharge Planning:   TBD. Please notify endocrinology prior to discharge.     - Decrease Lantus to 24 units HS.  - Continue Humalog 6 units in addition to the following. Correction scale:    150 - 200 + 1 unit    201 - 250 + 2 units    251 - 300 + 3 units    301 - 350 + 4 units     > 350   + 5 units  - Insurance preferred diabetes testing supplies  - Ultra-Fine Sarah Pen Needles 4mm x 32 G (5/32" x 0.23mm).   - Patient is to monitor BG ACHS, and follow-up with PCP for continued DM control and maintenance.         "

## 2022-02-03 NOTE — PROGRESS NOTES
Paracentesis complete, 4450 MLs removed. Specimen sent to lab. Dressing applied to RLQ puncture site, dressing clean dry and intact. Pt given site care instructions  pt verbalizes understanding. Questions answered. Pt denies pain and discomfort. Report given to RN

## 2022-02-03 NOTE — ASSESSMENT & PLAN NOTE
- h/h decreased  - see anemia of unknown mechanism   - continue to monitor with CBC   - CT without definite bleed and occult blood stool negative   - CMV pending, para results pending

## 2022-02-03 NOTE — SUBJECTIVE & OBJECTIVE
Scheduled Meds:   amiodarone  200 mg Oral Daily    amLODIPine  10 mg Oral Daily    bisacodyL  10 mg Rectal Daily    docusate sodium  50 mg Oral BID    insulin aspart U-100  6 Units Subcutaneous TIDWM    insulin detemir U-100  24 Units Subcutaneous QHS    magnesium oxide  400 mg Oral BID    magnesium sulfate IVPB  2 g Intravenous Once    pantoprazole  40 mg Oral Daily    polyethylene glycol  17 g Oral BID    pravastatin  20 mg Oral Daily    predniSONE  5 mg Oral Daily    sertraline  25 mg Oral Daily    sulfamethoxazole-trimethoprim 400-80mg  1 tablet Oral QAM    tacrolimus  1 mg Oral BID    tamsulosin  0.4 mg Oral QHS    valGANciclovir  450 mg Oral Daily     Continuous Infusions:  PRN Meds:sodium chloride, sodium chloride, acetaminophen, bisacodyL, dextrose 50%, dextrose 50%, gabapentin, glucagon (human recombinant), glucose, glucose, insulin aspart U-100, melatonin, ondansetron, sodium chloride 0.9%    Review of Systems   Constitutional: Positive for activity change (low energy), appetite change (decreased) and fatigue. Negative for chills and fever.   HENT: Negative for congestion, sinus pressure and sore throat.    Respiratory: Negative for cough, chest tightness, shortness of breath and wheezing.    Cardiovascular: Negative for chest pain, palpitations and leg swelling.   Gastrointestinal: Positive for abdominal distention, abdominal pain, constipation and nausea. Negative for diarrhea and vomiting.   Genitourinary: Negative for decreased urine volume, difficulty urinating, dysuria and frequency.   Musculoskeletal: Negative for arthralgias and back pain.   Skin: Negative for wound.   Allergic/Immunologic: Positive for immunocompromised state.   Neurological: Positive for weakness and light-headedness. Negative for dizziness, syncope and headaches.   Hematological: Bruises/bleeds easily.     Objective:     Vital Signs (Most Recent):  Temp: 98.1 °F (36.7 °C) (02/03/22 1114)  Pulse: 73 (02/03/22  1150)  Resp: 16 (02/03/22 1150)  BP: 133/63 (02/03/22 1150)  SpO2: (!) 94 % (02/03/22 1150) Vital Signs (24h Range):  Temp:  [97.8 °F (36.6 °C)-98.1 °F (36.7 °C)] 98.1 °F (36.7 °C)  Pulse:  [69-95] 73  Resp:  [16-22] 16  SpO2:  [91 %-98 %] 94 %  BP: (121-138)/(57-70) 133/63     Weight: 99.8 kg (220 lb 0.3 oz)  Body mass index is 29.84 kg/m².    Intake/Output - Last 3 Shifts       02/01 0700  02/02 0659 02/02 0700  02/03 0659 02/03 0700  02/04 0659    P.O. 550 1000     I.V. (mL/kg)  49.8 (0.5)     Blood 338.8 287.5     Other 300      IV Piggyback 250 247.3     Total Intake(mL/kg) 1438.8 (14.4) 1584.6 (15.9)     Urine (mL/kg/hr) 420 200 (0.1)     Other   4450    Total Output     Net +1018.8 +1384.6 -4450                 Physical Exam  Vitals and nursing note reviewed.   Constitutional:       General: He is not in acute distress.     Appearance: Normal appearance.   HENT:      Head: Normocephalic and atraumatic.      Nose: No congestion.      Mouth/Throat:      Mouth: Mucous membranes are moist.   Eyes:      General: No scleral icterus.     Extraocular Movements: Extraocular movements intact.      Conjunctiva/sclera: Conjunctivae normal.      Pupils: Pupils are equal, round, and reactive to light.   Cardiovascular:      Rate and Rhythm: Normal rate and regular rhythm.      Pulses: Normal pulses.      Heart sounds: Murmur (systolic in nature) heard.       Pulmonary:      Effort: Pulmonary effort is normal. No respiratory distress.      Breath sounds: Normal breath sounds. No wheezing, rhonchi or rales.   Abdominal:      General: Bowel sounds are normal. There is no distension.      Palpations: Abdomen is soft.      Tenderness: There is no abdominal tenderness. There is no guarding.       Musculoskeletal:         General: Normal range of motion.      Cervical back: Normal range of motion and neck supple.      Right lower leg: No edema.      Left lower leg: No edema.   Skin:     General: Skin is warm and dry.       Findings: Bruising present.   Neurological:      General: No focal deficit present.      Mental Status: He is alert and oriented to person, place, and time. Mental status is at baseline.      Motor: No weakness.   Psychiatric:         Mood and Affect: Mood normal.         Behavior: Behavior normal.         Thought Content: Thought content normal.         Judgment: Judgment normal.         Laboratory:  Immunosuppressants         Stop Route Frequency     tacrolimus capsule 1 mg         -- Oral 2 times daily        CBC:   Recent Labs   Lab 02/03/22  1333   WBC 3.91   RBC 2.56*   HGB 7.5*   HCT 23.9*      MCV 93   MCH 29.3   MCHC 31.4*     CMP:   Recent Labs   Lab 02/03/22  0551   *   CALCIUM 8.7   ALBUMIN 2.9*   PROT 5.2*   *   K 4.7   CO2 27      BUN 32*   CREATININE 1.3   ALKPHOS 48*   ALT 10   AST 11   BILITOT 0.7     Labs within the past 24 hours have been reviewed.    Diagnostic Results:  Reviewed or pending

## 2022-02-03 NOTE — ASSESSMENT & PLAN NOTE
- patient unable to obtain labs because of weakness   - tested positive 1/26  - infusion 1/27 without complication   - ID consulted for treatment recs - started remdesivir 2/1-2/3, continuing home PO pred   - Sats 92-94% on RA, CXR without evidence of cardiopulmonary disease  - cellcept held

## 2022-02-04 ENCOUNTER — PATIENT MESSAGE (OUTPATIENT)
Dept: TRANSPLANT | Facility: CLINIC | Age: 67
End: 2022-02-04
Payer: MEDICARE

## 2022-02-04 LAB
ALBUMIN SERPL BCP-MCNC: 2.7 G/DL (ref 3.5–5.2)
ALP SERPL-CCNC: 44 U/L (ref 55–135)
ALT SERPL W/O P-5'-P-CCNC: 10 U/L (ref 10–44)
ANION GAP SERPL CALC-SCNC: 5 MMOL/L (ref 8–16)
AST SERPL-CCNC: 12 U/L (ref 10–40)
BASOPHILS # BLD AUTO: 0.02 K/UL (ref 0–0.2)
BASOPHILS # BLD AUTO: 0.02 K/UL (ref 0–0.2)
BASOPHILS NFR BLD: 0.5 % (ref 0–1.9)
BASOPHILS NFR BLD: 0.5 % (ref 0–1.9)
BILIRUB SERPL-MCNC: 0.7 MG/DL (ref 0.1–1)
BUN SERPL-MCNC: 24 MG/DL (ref 8–23)
CALCIUM SERPL-MCNC: 8.1 MG/DL (ref 8.7–10.5)
CHLORIDE SERPL-SCNC: 100 MMOL/L (ref 95–110)
CO2 SERPL-SCNC: 28 MMOL/L (ref 23–29)
CREAT SERPL-MCNC: 1 MG/DL (ref 0.5–1.4)
CYTOMEGALOVIRUS DNA: NOT DETECTED
CYTOMEGALOVIRUS LOG (IU/ML): NOT DETECTED LOGIU/ML
CYTOMEGALOVIRUS PCR, QUANT: NOT DETECTED IU/ML
DIFFERENTIAL METHOD: ABNORMAL
DIFFERENTIAL METHOD: ABNORMAL
EOSINOPHIL # BLD AUTO: 0 K/UL (ref 0–0.5)
EOSINOPHIL # BLD AUTO: 0 K/UL (ref 0–0.5)
EOSINOPHIL NFR BLD: 0.5 % (ref 0–8)
EOSINOPHIL NFR BLD: 1.1 % (ref 0–8)
ERYTHROCYTE [DISTWIDTH] IN BLOOD BY AUTOMATED COUNT: 16.1 % (ref 11.5–14.5)
ERYTHROCYTE [DISTWIDTH] IN BLOOD BY AUTOMATED COUNT: 16.2 % (ref 11.5–14.5)
EST. GFR  (AFRICAN AMERICAN): >60 ML/MIN/1.73 M^2
EST. GFR  (NON AFRICAN AMERICAN): >60 ML/MIN/1.73 M^2
GLUCOSE SERPL-MCNC: 138 MG/DL (ref 70–110)
HCT VFR BLD AUTO: 23.7 % (ref 40–54)
HCT VFR BLD AUTO: 25.6 % (ref 40–54)
HGB BLD-MCNC: 7.6 G/DL (ref 14–18)
HGB BLD-MCNC: 7.9 G/DL (ref 14–18)
IMM GRANULOCYTES # BLD AUTO: 0.07 K/UL (ref 0–0.04)
IMM GRANULOCYTES # BLD AUTO: 0.08 K/UL (ref 0–0.04)
IMM GRANULOCYTES NFR BLD AUTO: 1.9 % (ref 0–0.5)
IMM GRANULOCYTES NFR BLD AUTO: 1.9 % (ref 0–0.5)
LYMPHOCYTES # BLD AUTO: 0.2 K/UL (ref 1–4.8)
LYMPHOCYTES # BLD AUTO: 0.3 K/UL (ref 1–4.8)
LYMPHOCYTES NFR BLD: 5.6 % (ref 18–48)
LYMPHOCYTES NFR BLD: 8.7 % (ref 18–48)
MAGNESIUM SERPL-MCNC: 1.5 MG/DL (ref 1.6–2.6)
MCH RBC QN AUTO: 28.8 PG (ref 27–31)
MCH RBC QN AUTO: 29.5 PG (ref 27–31)
MCHC RBC AUTO-ENTMCNC: 30.9 G/DL (ref 32–36)
MCHC RBC AUTO-ENTMCNC: 32.1 G/DL (ref 32–36)
MCV RBC AUTO: 92 FL (ref 82–98)
MCV RBC AUTO: 93 FL (ref 82–98)
MONOCYTES # BLD AUTO: 0.4 K/UL (ref 0.3–1)
MONOCYTES # BLD AUTO: 0.4 K/UL (ref 0.3–1)
MONOCYTES NFR BLD: 10.3 % (ref 4–15)
MONOCYTES NFR BLD: 10.3 % (ref 4–15)
NEUTROPHILS # BLD AUTO: 2.9 K/UL (ref 1.8–7.7)
NEUTROPHILS # BLD AUTO: 3.5 K/UL (ref 1.8–7.7)
NEUTROPHILS NFR BLD: 77.5 % (ref 38–73)
NEUTROPHILS NFR BLD: 81.2 % (ref 38–73)
NRBC BLD-RTO: 0 /100 WBC
NRBC BLD-RTO: 1 /100 WBC
PLATELET # BLD AUTO: 182 K/UL (ref 150–450)
PLATELET # BLD AUTO: 194 K/UL (ref 150–450)
PMV BLD AUTO: 10.1 FL (ref 9.2–12.9)
PMV BLD AUTO: 10.1 FL (ref 9.2–12.9)
POCT GLUCOSE: 154 MG/DL (ref 70–110)
POCT GLUCOSE: 179 MG/DL (ref 70–110)
POCT GLUCOSE: 201 MG/DL (ref 70–110)
POCT GLUCOSE: 217 MG/DL (ref 70–110)
POCT GLUCOSE: 222 MG/DL (ref 70–110)
POCT GLUCOSE: 230 MG/DL (ref 70–110)
POTASSIUM SERPL-SCNC: 4.1 MMOL/L (ref 3.5–5.1)
PROT SERPL-MCNC: 4.9 G/DL (ref 6–8.4)
RBC # BLD AUTO: 2.58 M/UL (ref 4.6–6.2)
RBC # BLD AUTO: 2.74 M/UL (ref 4.6–6.2)
SODIUM SERPL-SCNC: 133 MMOL/L (ref 136–145)
T4 FREE SERPL-MCNC: 0.96 NG/DL (ref 0.71–1.51)
TACROLIMUS BLD-MCNC: 8.2 NG/ML (ref 5–15)
TSH SERPL DL<=0.005 MIU/L-ACNC: 1.42 UIU/ML (ref 0.4–4)
WBC # BLD AUTO: 3.69 K/UL (ref 3.9–12.7)
WBC # BLD AUTO: 4.27 K/UL (ref 3.9–12.7)

## 2022-02-04 PROCEDURE — 80197 ASSAY OF TACROLIMUS: CPT

## 2022-02-04 PROCEDURE — 12000002 HC ACUTE/MED SURGE SEMI-PRIVATE ROOM

## 2022-02-04 PROCEDURE — 25000003 PHARM REV CODE 250

## 2022-02-04 PROCEDURE — 25000003 PHARM REV CODE 250: Performed by: NURSE PRACTITIONER

## 2022-02-04 PROCEDURE — 80053 COMPREHEN METABOLIC PANEL: CPT

## 2022-02-04 PROCEDURE — 27000207 HC ISOLATION

## 2022-02-04 PROCEDURE — 94760 N-INVAS EAR/PLS OXIMETRY 1: CPT

## 2022-02-04 PROCEDURE — 85025 COMPLETE CBC W/AUTO DIFF WBC: CPT | Mod: 91

## 2022-02-04 PROCEDURE — 85025 COMPLETE CBC W/AUTO DIFF WBC: CPT | Performed by: PHYSICIAN ASSISTANT

## 2022-02-04 PROCEDURE — 99233 PR SUBSEQUENT HOSPITAL CARE,LEVL III: ICD-10-PCS | Mod: CR,,,

## 2022-02-04 PROCEDURE — 84439 ASSAY OF FREE THYROXINE: CPT

## 2022-02-04 PROCEDURE — 84443 ASSAY THYROID STIM HORMONE: CPT

## 2022-02-04 PROCEDURE — 36415 COLL VENOUS BLD VENIPUNCTURE: CPT

## 2022-02-04 PROCEDURE — 83735 ASSAY OF MAGNESIUM: CPT

## 2022-02-04 PROCEDURE — 63600175 PHARM REV CODE 636 W HCPCS

## 2022-02-04 PROCEDURE — 99233 SBSQ HOSP IP/OBS HIGH 50: CPT | Mod: CR,,,

## 2022-02-04 PROCEDURE — 25000003 PHARM REV CODE 250: Performed by: PHYSICIAN ASSISTANT

## 2022-02-04 PROCEDURE — 94761 N-INVAS EAR/PLS OXIMETRY MLT: CPT

## 2022-02-04 PROCEDURE — 63600175 PHARM REV CODE 636 W HCPCS: Performed by: TRANSPLANT SURGERY

## 2022-02-04 RX ORDER — TALC
6 POWDER (GRAM) TOPICAL NIGHTLY
Status: DISCONTINUED | OUTPATIENT
Start: 2022-02-04 | End: 2022-02-06 | Stop reason: HOSPADM

## 2022-02-04 RX ORDER — BISACODYL 10 MG
10 SUPPOSITORY, RECTAL RECTAL DAILY PRN
Status: DISCONTINUED | OUTPATIENT
Start: 2022-02-04 | End: 2022-02-06 | Stop reason: HOSPADM

## 2022-02-04 RX ORDER — MAGNESIUM SULFATE HEPTAHYDRATE 40 MG/ML
2 INJECTION, SOLUTION INTRAVENOUS ONCE
Status: COMPLETED | OUTPATIENT
Start: 2022-02-04 | End: 2022-02-04

## 2022-02-04 RX ORDER — CEFEPIME HYDROCHLORIDE 1 G/50ML
1 INJECTION, SOLUTION INTRAVENOUS
Status: DISCONTINUED | OUTPATIENT
Start: 2022-02-04 | End: 2022-02-05

## 2022-02-04 RX ADMIN — DOCUSATE SODIUM 50 MG: 50 CAPSULE, LIQUID FILLED ORAL at 09:02

## 2022-02-04 RX ADMIN — INSULIN ASPART 6 UNITS: 100 INJECTION, SOLUTION INTRAVENOUS; SUBCUTANEOUS at 06:02

## 2022-02-04 RX ADMIN — CEFEPIME HYDROCHLORIDE 1 G: 1 INJECTION, SOLUTION INTRAVENOUS at 11:02

## 2022-02-04 RX ADMIN — PANTOPRAZOLE SODIUM 40 MG: 40 TABLET, DELAYED RELEASE ORAL at 09:02

## 2022-02-04 RX ADMIN — SULFAMETHOXAZOLE AND TRIMETHOPRIM 1 TABLET: 400; 80 TABLET ORAL at 06:02

## 2022-02-04 RX ADMIN — TACROLIMUS 1 MG: 1 CAPSULE ORAL at 09:02

## 2022-02-04 RX ADMIN — PREDNISONE 5 MG: 5 TABLET ORAL at 09:02

## 2022-02-04 RX ADMIN — VALGANCICLOVIR 450 MG: 450 TABLET, FILM COATED ORAL at 09:02

## 2022-02-04 RX ADMIN — INSULIN ASPART 2 UNITS: 100 INJECTION, SOLUTION INTRAVENOUS; SUBCUTANEOUS at 06:02

## 2022-02-04 RX ADMIN — INSULIN ASPART 6 UNITS: 100 INJECTION, SOLUTION INTRAVENOUS; SUBCUTANEOUS at 09:02

## 2022-02-04 RX ADMIN — CEFEPIME HYDROCHLORIDE 1 G: 1 INJECTION, SOLUTION INTRAVENOUS at 04:02

## 2022-02-04 RX ADMIN — TAMSULOSIN HYDROCHLORIDE 0.4 MG: 0.4 CAPSULE ORAL at 09:02

## 2022-02-04 RX ADMIN — CEFEPIME HYDROCHLORIDE 2 G: 2 INJECTION, SOLUTION INTRAVENOUS at 06:02

## 2022-02-04 RX ADMIN — PRAVASTATIN SODIUM 20 MG: 20 TABLET ORAL at 09:02

## 2022-02-04 RX ADMIN — MAGNESIUM SULFATE IN WATER 2 G: 40 INJECTION, SOLUTION INTRAVENOUS at 09:02

## 2022-02-04 RX ADMIN — Medication 400 MG: at 09:02

## 2022-02-04 RX ADMIN — INSULIN ASPART 2 UNITS: 100 INJECTION, SOLUTION INTRAVENOUS; SUBCUTANEOUS at 01:02

## 2022-02-04 RX ADMIN — INSULIN ASPART 6 UNITS: 100 INJECTION, SOLUTION INTRAVENOUS; SUBCUTANEOUS at 01:02

## 2022-02-04 RX ADMIN — AMLODIPINE BESYLATE 10 MG: 10 TABLET ORAL at 09:02

## 2022-02-04 RX ADMIN — SERTRALINE HYDROCHLORIDE 25 MG: 25 TABLET ORAL at 09:02

## 2022-02-04 RX ADMIN — INSULIN ASPART 1 UNITS: 100 INJECTION, SOLUTION INTRAVENOUS; SUBCUTANEOUS at 10:02

## 2022-02-04 RX ADMIN — TACROLIMUS 1 MG: 1 CAPSULE ORAL at 06:02

## 2022-02-04 RX ADMIN — AMIODARONE HYDROCHLORIDE 200 MG: 200 TABLET ORAL at 09:02

## 2022-02-04 RX ADMIN — Medication 6 MG: at 09:02

## 2022-02-04 NOTE — CARE UPDATE
"BG goal 140 -180   Diet diabetic [facility]; 2000 Calorie; Isolation Tray - Styrofoam       BG stable while on current SQ insulin regimen, which is comparable to home insulin regimen. Endo will continue to follow, and manage glycemic control while inpatient.     - Continue Levemir 24 units HS.   - Continue Novolog 6 units TIDWM.   - Low Dose SQ Insulin Correction Scale.  - BG Monitoring AC/HS     ** Please call Endocrine for any BG related issues **  ** Please notify Endocrine for any change and/or advance in diet**    Lab Results   Component Value Date    HGBA1C 5.8 (H) 01/05/2022       Discharge Planning:   TBD. Please notify endocrinology prior to discharge.     -  Lantus to 24 units HS.  - Continue Humalog 6 units in addition to the following. Correction scale:    150 - 200 + 1 unit    201 - 250 + 2 units    251 - 300 + 3 units    301 - 350 + 4 units     > 350   + 5 units  - Insurance preferred diabetes testing supplies  - Ultra-Fine Sarah Pen Needles 4mm x 32 G (5/32" x 0.23mm).   - Patient is to monitor BG ACHS, and follow-up with PCP for continued DM control and maintenance.         "

## 2022-02-04 NOTE — SUBJECTIVE & OBJECTIVE
Scheduled Meds:   amiodarone  200 mg Oral Daily    amLODIPine  10 mg Oral Daily    ceFEPime (MAXIPIME) IVPB  1 g Intravenous Q8H    docusate sodium  50 mg Oral BID    insulin aspart U-100  6 Units Subcutaneous TIDWM    insulin detemir U-100  24 Units Subcutaneous QHS    magnesium oxide  400 mg Oral BID    melatonin  6 mg Oral Nightly    pantoprazole  40 mg Oral Daily    polyethylene glycol  17 g Oral BID    pravastatin  20 mg Oral Daily    predniSONE  5 mg Oral Daily    sertraline  25 mg Oral Daily    sulfamethoxazole-trimethoprim 400-80mg  1 tablet Oral QAM    tacrolimus  1 mg Oral BID    tamsulosin  0.4 mg Oral QHS    valGANciclovir  450 mg Oral Daily     Continuous Infusions:  PRN Meds:sodium chloride, sodium chloride, acetaminophen, bisacodyL, bisacodyL, dextrose 50%, dextrose 50%, gabapentin, glucagon (human recombinant), glucose, glucose, insulin aspart U-100, ondansetron, sodium chloride 0.9%    Review of Systems   Constitutional: Positive for activity change (low energy), appetite change (decreased) and fatigue. Negative for chills and fever.   HENT: Negative for congestion, sinus pressure and sore throat.    Respiratory: Negative for cough, chest tightness, shortness of breath and wheezing.    Cardiovascular: Negative for chest pain, palpitations and leg swelling.   Gastrointestinal: Negative for abdominal distention, abdominal pain, constipation, diarrhea, nausea and vomiting.   Genitourinary: Negative for decreased urine volume, difficulty urinating, dysuria and frequency.   Musculoskeletal: Negative for arthralgias and back pain.   Skin: Negative for wound.   Allergic/Immunologic: Positive for immunocompromised state.   Neurological: Positive for weakness and light-headedness. Negative for dizziness, syncope and headaches.   Hematological: Bruises/bleeds easily.     Objective:     Vital Signs (Most Recent):  Temp: 97.8 °F (36.6 °C) (02/04/22 1144)  Pulse: 87 (02/04/22 1144)  Resp: 18  (02/04/22 1144)  BP: (!) 108/55 (02/04/22 1144)  SpO2: (!) 94 % (02/04/22 1144) Vital Signs (24h Range):  Temp:  [97.6 °F (36.4 °C)-98.5 °F (36.9 °C)] 97.8 °F (36.6 °C)  Pulse:  [] 87  Resp:  [14-18] 18  SpO2:  [92 %-99 %] 94 %  BP: (100-163)/(51-97) 108/55     Weight: 99.8 kg (220 lb 0.3 oz)  Body mass index is 29.84 kg/m².    Intake/Output - Last 3 Shifts       02/02 0700  02/03 0659 02/03 0700  02/04 0659 02/04 0700  02/05 0659    P.O. 1000 500     I.V. (mL/kg) 49.8 (0.5)      Blood 287.5      Other       IV Piggyback 247.3 50     Total Intake(mL/kg) 1584.6 (15.9) 550 (5.5)     Urine (mL/kg/hr) 200 (0.1) 1400 (0.6)     Other  4450     Stool  0     Total Output 200 5850     Net +1384.6 -5300            Stool Occurrence  0 x           Physical Exam  Vitals and nursing note reviewed.   Constitutional:       General: He is not in acute distress.     Appearance: Normal appearance.   HENT:      Head: Normocephalic and atraumatic.      Nose: No congestion.      Mouth/Throat:      Mouth: Mucous membranes are moist.   Eyes:      General: No scleral icterus.     Extraocular Movements: Extraocular movements intact.      Conjunctiva/sclera: Conjunctivae normal.      Pupils: Pupils are equal, round, and reactive to light.   Cardiovascular:      Rate and Rhythm: Normal rate and regular rhythm.      Pulses: Normal pulses.      Heart sounds: Murmur (systolic in nature) heard.       Pulmonary:      Effort: Pulmonary effort is normal. No respiratory distress.      Breath sounds: Normal breath sounds. No wheezing, rhonchi or rales.   Abdominal:      General: Bowel sounds are normal. There is no distension.      Palpations: Abdomen is soft.      Tenderness: There is no abdominal tenderness. There is no guarding.       Musculoskeletal:         General: Normal range of motion.      Cervical back: Normal range of motion and neck supple.      Right lower leg: No edema.      Left lower leg: No edema.   Skin:     General: Skin is  warm and dry.      Findings: Bruising present.   Neurological:      General: No focal deficit present.      Mental Status: He is alert and oriented to person, place, and time. Mental status is at baseline.      Motor: No weakness.   Psychiatric:         Mood and Affect: Mood normal.         Behavior: Behavior normal.         Thought Content: Thought content normal.         Judgment: Judgment normal.         Laboratory:  Immunosuppressants         Stop Route Frequency     tacrolimus capsule 1 mg         -- Oral 2 times daily        CBC:   Recent Labs   Lab 02/04/22  0536   WBC 3.69*   RBC 2.58*   HGB 7.6*   HCT 23.7*      MCV 92   MCH 29.5   MCHC 32.1     CMP:   Recent Labs   Lab 02/04/22  0536   *   CALCIUM 8.1*   ALBUMIN 2.7*   PROT 4.9*   *   K 4.1   CO2 28      BUN 24*   CREATININE 1.0   ALKPHOS 44*   ALT 10   AST 12   BILITOT 0.7     Labs within the past 24 hours have been reviewed.    Diagnostic Results:  I have personally reviewed all pertinent imaging studies.

## 2022-02-04 NOTE — ASSESSMENT & PLAN NOTE
- hgb 5.5 on admission   - ASA and elliquis held  - 2 units PRBCs transfused 2/2  - 1st unit with hgb response to 6   - 2nd unit response hgb ~8.5  - RBC studies with some type of potential hemolytic process, retics elevated   - para with 4.5L bloody fluid out  - CMV pending, occult blood stool negative  - monitor closely, afternoon CBC 2/4 pending

## 2022-02-04 NOTE — PROGRESS NOTES
[facility] - Tele[facility] Stepdown  Liver Transplant  Progress Note    Patient Name: Tej Purdy  MRN: [id number]  Admission Date: 2/1/2022  Hospital Length of Stay: 3 days  Code Status: Full Code  Primary Care Provider: Thais Maxwell MD  Post-Operative Day: 29    ORGAN:   LIVER  Disease Etiology: Primary Liver Malignancy: Hepatoma (HCC) and Cirrhosis  Donor Type:   Donation after Brain Death  CDC High Risk:   No  Donor CMV Status:   Donor CMV Status: Positive  Donor HBcAB:   Negative  Donor HCV Status:   Negative  Donor HBV JAYLIN: Negative  Donor HCV JAYLIN: Negative  Whole or Partial: Whole Liver  Biliary Anastomosis: End to End  Arterial Anatomy: Replaced Left Hepatic from Left Gastric  Subjective:     History of Present Illness:  Mr. 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 Operation notable for replaced LHA, reconstructed. Post op course without complication. Patient with history of afib followed by cardiology who recommends continuing amiodarone with elliquis anticoagulation. 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 Patient to follow up with medical oncology for possible further treatment. Appointment scheduled for 1/26 cancelled due to testing positive for COVID19 1/26. He had an infusion for COVID on 1/27 without complication. Patient is being admitted for COVID treatment and monitoring. He was unable to make his lab appointments due to weakness. Patient reports generalized weakness, especially with activity, as well as decreased appetite, light headedness, and constipation. Denies cough, SOB, CP, palpitations, NVD, changes in urinary function. Due to recent transplant, infectious workup started on admission. Will treat COVID with remdesivir. Cellcept held for infection. Cardiac workup in process as well.       Hospital Course:  Mr. Purdy was admitted 2/1 for increasing weakness. Labs on admission revealed a h/h of 5.5/17.9. Has received 2 units of PRBCs as  of 2/2, h/h responded appropriately. ASA and elliquis held. CT abd/pelvis was attained to assess for hemorrhage. CT revealed new moderate abdominopelvic ascites that appears slightly complex in the pelvis. Para 2/3 with 4.5L of bloody fluid drained, labs with some concern for SBP, cefepime started. Occult blood stool negative. Cardiac w/u without acute findings, infectious w/u with BCs NGTD, clean UA. CXR with no acute cardiopulmonary disease.    Interval History   NAEON. Patient feeling ok this AM. Remains light headed with walking/moving. Of note, patient with low appetite, boost ordered. Continuing investigation into anemia/weakness. Thyroid studied normal. Repeat CBC this afternoon to assess for need of more blood pending. Replaced electrolytes. VSS, labs otherwise stable. CMV pending.     Spoke to patient's wife, Aniya ([phone]) this AM about patient's care and progress since admission. She understands and is without questions at the moment.       Scheduled Meds:   amiodarone  200 mg Oral Daily    amLODIPine  10 mg Oral Daily    ceFEPime (MAXIPIME) IVPB  1 g Intravenous Q8H    docusate sodium  50 mg Oral BID    insulin aspart U-100  6 Units Subcutaneous TIDWM    insulin detemir U-100  24 Units Subcutaneous QHS    magnesium oxide  400 mg Oral BID    melatonin  6 mg Oral Nightly    pantoprazole  40 mg Oral Daily    polyethylene glycol  17 g Oral BID    pravastatin  20 mg Oral Daily    predniSONE  5 mg Oral Daily    sertraline  25 mg Oral Daily    sulfamethoxazole-trimethoprim 400-80mg  1 tablet Oral QAM    tacrolimus  1 mg Oral BID    tamsulosin  0.4 mg Oral QHS    valGANciclovir  450 mg Oral Daily     Continuous Infusions:  PRN Meds:sodium chloride, sodium chloride, acetaminophen, bisacodyL, bisacodyL, dextrose 50%, dextrose 50%, gabapentin, glucagon (human recombinant), glucose, glucose, insulin aspart U-100, ondansetron, sodium chloride 0.9%    Review of Systems   Constitutional: Positive  for activity change (low energy), appetite change (decreased) and fatigue. Negative for chills and fever.   HENT: Negative for congestion, sinus pressure and sore throat.    Respiratory: Negative for cough, chest tightness, shortness of breath and wheezing.    Cardiovascular: Negative for chest pain, palpitations and leg swelling.   Gastrointestinal: Negative for abdominal distention, abdominal pain, constipation, diarrhea, nausea and vomiting.   Genitourinary: Negative for decreased urine volume, difficulty urinating, dysuria and frequency.   Musculoskeletal: Negative for arthralgias and back pain.   Skin: Negative for wound.   Allergic/Immunologic: Positive for immunocompromised state.   Neurological: Positive for weakness and light-headedness. Negative for dizziness, syncope and headaches.   Hematological: Bruises/bleeds easily.     Objective:     Vital Signs (Most Recent):  Temp: 97.8 °F (36.6 °C) (02/04/22 1144)  Pulse: 87 (02/04/22 1144)  Resp: 18 (02/04/22 1144)  BP: (!) 108/55 (02/04/22 1144)  SpO2: (!) 94 % (02/04/22 1144) Vital Signs (24h Range):  Temp:  [97.6 °F (36.4 °C)-98.5 °F (36.9 °C)] 97.8 °F (36.6 °C)  Pulse:  [] 87  Resp:  [14-18] 18  SpO2:  [92 %-99 %] 94 %  BP: (100-163)/(51-97) 108/55     Weight: 99.8 kg (220 lb 0.3 oz)  Body mass index is 29.84 kg/m².    Intake/Output - Last 3 Shifts       02/02 0700  02/03 0659 02/03 0700  02/04 0659 02/04 0700  02/05 0659    P.O. 1000 500     I.V. (mL/kg) 49.8 (0.5)      Blood 287.5      Other       IV Piggyback 247.3 50     Total Intake(mL/kg) 1584.6 (15.9) 550 (5.5)     Urine (mL/kg/hr) 200 (0.1) 1400 (0.6)     Other  4450     Stool  0     Total Output 200 5850     Net +1384.6 -5300            Stool Occurrence  0 x           Physical Exam  Vitals and nursing note reviewed.   Constitutional:       General: He is not in acute distress.     Appearance: Normal appearance.   HENT:      Head: Normocephalic and atraumatic.      Nose: No congestion.       Mouth/Throat:      Mouth: Mucous membranes are moist.   Eyes:      General: No scleral icterus.     Extraocular Movements: Extraocular movements intact.      Conjunctiva/sclera: Conjunctivae normal.      Pupils: Pupils are equal, round, and reactive to light.   Cardiovascular:      Rate and Rhythm: Normal rate and regular rhythm.      Pulses: Normal pulses.      Heart sounds: Murmur (systolic in nature) heard.       Pulmonary:      Effort: Pulmonary effort is normal. No respiratory distress.      Breath sounds: Normal breath sounds. No wheezing, rhonchi or rales.   Abdominal:      General: Bowel sounds are normal. There is no distension.      Palpations: Abdomen is soft.      Tenderness: There is no abdominal tenderness. There is no guarding.       Musculoskeletal:         General: Normal range of motion.      Cervical back: Normal range of motion and neck supple.      Right lower leg: No edema.      Left lower leg: No edema.   Skin:     General: Skin is warm and dry.      Findings: Bruising present.   Neurological:      General: No focal deficit present.      Mental Status: He is alert and oriented to person, place, and time. Mental status is at baseline.      Motor: No weakness.   Psychiatric:         Mood and Affect: Mood normal.         Behavior: Behavior normal.         Thought Content: Thought content normal.         Judgment: Judgment normal.         Laboratory:  Immunosuppressants         Stop Route Frequency     tacrolimus capsule 1 mg         -- Oral 2 times daily        CBC:   Recent Labs   Lab 02/04/22  0536   WBC 3.69*   RBC 2.58*   HGB 7.6*   HCT 23.7*      MCV 92   MCH 29.5   MCHC 32.1     CMP:   Recent Labs   Lab 02/04/22  0536   *   CALCIUM 8.1*   ALBUMIN 2.7*   PROT 4.9*   *   K 4.1   CO2 28      BUN 24*   CREATININE 1.0   ALKPHOS 44*   ALT 10   AST 12   BILITOT 0.7     Labs within the past 24 hours have been reviewed.    Diagnostic Results:  I have personally reviewed all  pertinent imaging studies.      Assessment/Plan:     * Anemia due to unknown mechanism  - hgb 5.5 on admission   - ASA and elliquis held  - 2 units PRBCs transfused 2/2  - 1st unit with hgb response to 6   - 2nd unit response hgb ~8.5  - RBC studies with some type of potential hemolytic process, retics elevated   - para with 4.5L bloody fluid out  - CMV pending, occult blood stool negative  - monitor closely, afternoon CBC 2/4 pending     Type 2 diabetes mellitus with hyperglycemia  - endocrine consulted, appreciate help    Weakness  - patient with covid 1/26   - missed labs due to weakness   - will treat covid  - w/u for other potential infectious process negative thus far   - EKG with NSR, troponin/bnp negative, cxr stable   - h/h low, see anemia of unknown origin   - PT ordered      Long-term use of immunosuppressant medication  - see prophylactic immunotherapy       COVID-19  - patient unable to obtain labs because of weakness   - tested positive 1/26  - infusion 1/27 without complication   - ID consulted for treatment recs - started remdesivir 2/1-2/3, continuing home PO pred   - Sats 92-94% on RA, CXR without evidence of cardiopulmonary disease  - cellcept held       Anemia of chronic disease  - h/h decreased  - see anemia of unknown mechanism   - continue to monitor with CBC   - CT without definite bleed and occult blood stool negative   - CMV pending  - para with bloody fluid, 4.5L out, increased WBC/segs - started cefepime     Prophylactic immunotherapy  - maintanence on prograf, cellcept, prednisone   - prograf levels monitored daily for toxicity, therapeutic levels   - cellcept held for covid infection     At risk for opportunistic infections  - continue OI prophylaxis per protocol       S/P liver transplant  - 67 y/o man s/p DBD LTx on 1/6/21 for 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 Post op course without complication  - Trend LFTs    Of note: pathology from transplant  with cholangiocarcinoma, not HCC. Patient to follow up with medical oncology for possible further treatment. Appointment scheduled for 1/26 cancelled due to testing positive for COVID19 1/26.    Persistent atrial fibrillation  - continue home amiodarone   - holding home elliquis for decreased h/h  - admit EKG NSR    Type 2 diabetes mellitus  - monitor bg closely   - endocrine consulted, appreciate help    Diabetic neuropathy  - continuing home gabapentin      Hyperlipidemia  - continuing home statin        VTE Risk Mitigation (From admission, onward)         Ordered     IP VTE HIGH RISK PATIENT  Once         02/01/22 1449     Place sequential compression device  Until discontinued         02/01/22 1449                The patients clinical status was discussed at multidisplinary rounds, involving transplant surgery, transplant medicine, pharmacy, nursing, nutrition, and social work    Discharge Planning:  PharmD review: will need endocrine follow up ( 1/24/22)  not yet stable for dc     Blanche Parker PARositaC  Liver Transplant  [facility] - Tele[facility] Step[facility]

## 2022-02-04 NOTE — PLAN OF CARE
Problem: Adult Inpatient Plan of Care  Goal: Plan of Care Review  Outcome: Ongoing, Progressing  Goal: Patient-Specific Goal (Individualized)  Outcome: Ongoing, Progressing  Goal: Absence of Hospital-Acquired Illness or Injury  Outcome: Ongoing, Progressing     Safety maintained. VSS. Q2 turns. No complaints of pain.

## 2022-02-04 NOTE — ASSESSMENT & PLAN NOTE
- patient with covid 1/26   - missed labs due to weakness   - will treat covid  - w/u for other potential infectious process negative thus far   - EKG with NSR, troponin/bnp negative, cxr stable   - h/h low, see anemia of unknown origin   - PT ordered

## 2022-02-04 NOTE — ASSESSMENT & PLAN NOTE
- h/h decreased  - see anemia of unknown mechanism   - continue to monitor with CBC   - CT without definite bleed and occult blood stool negative   - CMV pending  - para with bloody fluid, 4.5L out, increased WBC/segs - started cefepime

## 2022-02-05 LAB
ALBUMIN SERPL BCP-MCNC: 2.6 G/DL (ref 3.5–5.2)
ALP SERPL-CCNC: 43 U/L (ref 55–135)
ALT SERPL W/O P-5'-P-CCNC: 11 U/L (ref 10–44)
ANION GAP SERPL CALC-SCNC: 7 MMOL/L (ref 8–16)
AST SERPL-CCNC: 12 U/L (ref 10–40)
BASOPHILS # BLD AUTO: 0.02 K/UL (ref 0–0.2)
BASOPHILS NFR BLD: 0.6 % (ref 0–1.9)
BILIRUB SERPL-MCNC: 0.6 MG/DL (ref 0.1–1)
BUN SERPL-MCNC: 22 MG/DL (ref 8–23)
CALCIUM SERPL-MCNC: 8.1 MG/DL (ref 8.7–10.5)
CHLORIDE SERPL-SCNC: 99 MMOL/L (ref 95–110)
CO2 SERPL-SCNC: 25 MMOL/L (ref 23–29)
CREAT SERPL-MCNC: 0.9 MG/DL (ref 0.5–1.4)
DIFFERENTIAL METHOD: ABNORMAL
EOSINOPHIL # BLD AUTO: 0 K/UL (ref 0–0.5)
EOSINOPHIL NFR BLD: 1.1 % (ref 0–8)
ERYTHROCYTE [DISTWIDTH] IN BLOOD BY AUTOMATED COUNT: 15.6 % (ref 11.5–14.5)
EST. GFR  (AFRICAN AMERICAN): >60 ML/MIN/1.73 M^2
EST. GFR  (NON AFRICAN AMERICAN): >60 ML/MIN/1.73 M^2
GLUCOSE SERPL-MCNC: 162 MG/DL (ref 70–110)
HCT VFR BLD AUTO: 23.4 % (ref 40–54)
HGB BLD-MCNC: 7.4 G/DL (ref 14–18)
IMM GRANULOCYTES # BLD AUTO: 0.04 K/UL (ref 0–0.04)
IMM GRANULOCYTES NFR BLD AUTO: 1.1 % (ref 0–0.5)
LYMPHOCYTES # BLD AUTO: 0.4 K/UL (ref 1–4.8)
LYMPHOCYTES NFR BLD: 11.8 % (ref 18–48)
MAGNESIUM SERPL-MCNC: 1.7 MG/DL (ref 1.6–2.6)
MCH RBC QN AUTO: 29.1 PG (ref 27–31)
MCHC RBC AUTO-ENTMCNC: 31.6 G/DL (ref 32–36)
MCV RBC AUTO: 92 FL (ref 82–98)
MONOCYTES # BLD AUTO: 0.4 K/UL (ref 0.3–1)
MONOCYTES NFR BLD: 11.8 % (ref 4–15)
NEUTROPHILS # BLD AUTO: 2.6 K/UL (ref 1.8–7.7)
NEUTROPHILS NFR BLD: 73.6 % (ref 38–73)
NRBC BLD-RTO: 0 /100 WBC
PLATELET # BLD AUTO: 182 K/UL (ref 150–450)
PMV BLD AUTO: 10.1 FL (ref 9.2–12.9)
POCT GLUCOSE: 139 MG/DL (ref 70–110)
POCT GLUCOSE: 203 MG/DL (ref 70–110)
POCT GLUCOSE: 208 MG/DL (ref 70–110)
POCT GLUCOSE: 239 MG/DL (ref 70–110)
POCT GLUCOSE: 259 MG/DL (ref 70–110)
POTASSIUM SERPL-SCNC: 4.1 MMOL/L (ref 3.5–5.1)
PROT SERPL-MCNC: 4.8 G/DL (ref 6–8.4)
RBC # BLD AUTO: 2.54 M/UL (ref 4.6–6.2)
SODIUM SERPL-SCNC: 131 MMOL/L (ref 136–145)
TACROLIMUS BLD-MCNC: 7.5 NG/ML (ref 5–15)
WBC # BLD AUTO: 3.57 K/UL (ref 3.9–12.7)

## 2022-02-05 PROCEDURE — 80197 ASSAY OF TACROLIMUS: CPT

## 2022-02-05 PROCEDURE — 99900035 HC TECH TIME PER 15 MIN (STAT)

## 2022-02-05 PROCEDURE — 12000002 HC ACUTE/MED SURGE SEMI-PRIVATE ROOM

## 2022-02-05 PROCEDURE — 25000003 PHARM REV CODE 250: Performed by: NURSE PRACTITIONER

## 2022-02-05 PROCEDURE — 36415 COLL VENOUS BLD VENIPUNCTURE: CPT

## 2022-02-05 PROCEDURE — 25000003 PHARM REV CODE 250

## 2022-02-05 PROCEDURE — 63600175 PHARM REV CODE 636 W HCPCS

## 2022-02-05 PROCEDURE — 27000207 HC ISOLATION

## 2022-02-05 PROCEDURE — 85025 COMPLETE CBC W/AUTO DIFF WBC: CPT

## 2022-02-05 PROCEDURE — 80053 COMPREHEN METABOLIC PANEL: CPT

## 2022-02-05 PROCEDURE — 94761 N-INVAS EAR/PLS OXIMETRY MLT: CPT

## 2022-02-05 PROCEDURE — 83735 ASSAY OF MAGNESIUM: CPT

## 2022-02-05 PROCEDURE — 25000003 PHARM REV CODE 250: Performed by: PHYSICIAN ASSISTANT

## 2022-02-05 RX ORDER — CEFPODOXIME PROXETIL 200 MG/1
400 TABLET, FILM COATED ORAL EVERY 12 HOURS
Status: DISCONTINUED | OUTPATIENT
Start: 2022-02-05 | End: 2022-02-06

## 2022-02-05 RX ORDER — POLYETHYLENE GLYCOL 3350 17 G/17G
17 POWDER, FOR SOLUTION ORAL 2 TIMES DAILY PRN
Status: DISCONTINUED | OUTPATIENT
Start: 2022-02-05 | End: 2022-02-06 | Stop reason: HOSPADM

## 2022-02-05 RX ADMIN — INSULIN ASPART 6 UNITS: 100 INJECTION, SOLUTION INTRAVENOUS; SUBCUTANEOUS at 01:02

## 2022-02-05 RX ADMIN — DOCUSATE SODIUM 50 MG: 50 CAPSULE, LIQUID FILLED ORAL at 08:02

## 2022-02-05 RX ADMIN — PRAVASTATIN SODIUM 20 MG: 20 TABLET ORAL at 10:02

## 2022-02-05 RX ADMIN — CEFEPIME HYDROCHLORIDE 1 G: 1 INJECTION, SOLUTION INTRAVENOUS at 08:02

## 2022-02-05 RX ADMIN — INSULIN ASPART 6 UNITS: 100 INJECTION, SOLUTION INTRAVENOUS; SUBCUTANEOUS at 05:02

## 2022-02-05 RX ADMIN — DOCUSATE SODIUM 50 MG: 50 CAPSULE, LIQUID FILLED ORAL at 09:02

## 2022-02-05 RX ADMIN — INSULIN ASPART 2 UNITS: 100 INJECTION, SOLUTION INTRAVENOUS; SUBCUTANEOUS at 05:02

## 2022-02-05 RX ADMIN — TAMSULOSIN HYDROCHLORIDE 0.4 MG: 0.4 CAPSULE ORAL at 09:02

## 2022-02-05 RX ADMIN — TACROLIMUS 1 MG: 1 CAPSULE ORAL at 08:02

## 2022-02-05 RX ADMIN — Medication 6 MG: at 09:02

## 2022-02-05 RX ADMIN — SULFAMETHOXAZOLE AND TRIMETHOPRIM 1 TABLET: 400; 80 TABLET ORAL at 11:02

## 2022-02-05 RX ADMIN — INSULIN ASPART 6 UNITS: 100 INJECTION, SOLUTION INTRAVENOUS; SUBCUTANEOUS at 08:02

## 2022-02-05 RX ADMIN — INSULIN ASPART 2 UNITS: 100 INJECTION, SOLUTION INTRAVENOUS; SUBCUTANEOUS at 01:02

## 2022-02-05 RX ADMIN — VALGANCICLOVIR 450 MG: 450 TABLET, FILM COATED ORAL at 10:02

## 2022-02-05 RX ADMIN — PANTOPRAZOLE SODIUM 40 MG: 40 TABLET, DELAYED RELEASE ORAL at 08:02

## 2022-02-05 RX ADMIN — Medication 400 MG: at 09:02

## 2022-02-05 RX ADMIN — Medication 400 MG: at 10:02

## 2022-02-05 RX ADMIN — CEFPODOXIME PROXETIL 400 MG: 200 TABLET, FILM COATED ORAL at 09:02

## 2022-02-05 RX ADMIN — PREDNISONE 5 MG: 5 TABLET ORAL at 10:02

## 2022-02-05 RX ADMIN — AMIODARONE HYDROCHLORIDE 200 MG: 200 TABLET ORAL at 10:02

## 2022-02-05 RX ADMIN — CEFPODOXIME PROXETIL 400 MG: 200 TABLET, FILM COATED ORAL at 01:02

## 2022-02-05 RX ADMIN — AMLODIPINE BESYLATE 10 MG: 10 TABLET ORAL at 10:02

## 2022-02-05 RX ADMIN — INSULIN ASPART 1 UNITS: 100 INJECTION, SOLUTION INTRAVENOUS; SUBCUTANEOUS at 09:02

## 2022-02-05 RX ADMIN — TACROLIMUS 1 MG: 1 CAPSULE ORAL at 05:02

## 2022-02-05 RX ADMIN — SERTRALINE HYDROCHLORIDE 25 MG: 25 TABLET ORAL at 10:02

## 2022-02-05 NOTE — ASSESSMENT & PLAN NOTE
- hgb 5.5 on admission   - ASA and elliquis held  - 2 units PRBCs transfused 2/2  - 1st unit with hgb response to 6   - 2nd unit response hgb ~8.5  - RBC studies with some type of potential hemolytic process, retics elevated   - para with 4.5L bloody fluid out  - CMV NOT detected, occult blood stool negative  - monitor closely

## 2022-02-05 NOTE — SUBJECTIVE & OBJECTIVE
Scheduled Meds:   amiodarone  200 mg Oral Daily    amLODIPine  10 mg Oral Daily    cefpodoxime  400 mg Oral Q12H    docusate sodium  50 mg Oral BID    insulin aspart U-100  6 Units Subcutaneous TIDWM    insulin detemir U-100  24 Units Subcutaneous QHS    magnesium oxide  400 mg Oral BID    melatonin  6 mg Oral Nightly    pantoprazole  40 mg Oral Daily    pravastatin  20 mg Oral Daily    predniSONE  5 mg Oral Daily    sertraline  25 mg Oral Daily    sulfamethoxazole-trimethoprim 400-80mg  1 tablet Oral QAM    tacrolimus  1 mg Oral BID    tamsulosin  0.4 mg Oral QHS    valGANciclovir  450 mg Oral Daily     Continuous Infusions:  PRN Meds:sodium chloride, sodium chloride, acetaminophen, bisacodyL, bisacodyL, dextrose 50%, dextrose 50%, gabapentin, glucagon (human recombinant), glucose, glucose, insulin aspart U-100, ondansetron, polyethylene glycol, sodium chloride 0.9%    Review of Systems   Constitutional: Positive for activity change (low energy), appetite change (decreased) and fatigue. Negative for chills and fever.   HENT: Negative for congestion, sinus pressure and sore throat.    Respiratory: Negative for cough, chest tightness, shortness of breath and wheezing.    Cardiovascular: Negative for chest pain, palpitations and leg swelling.   Gastrointestinal: Negative for abdominal distention, abdominal pain, constipation, diarrhea, nausea and vomiting.   Genitourinary: Negative for decreased urine volume, difficulty urinating, dysuria and frequency.   Musculoskeletal: Negative for arthralgias and back pain.   Skin: Negative for wound.   Allergic/Immunologic: Positive for immunocompromised state.   Neurological: Positive for weakness and light-headedness. Negative for dizziness, syncope and headaches.   Hematological: Bruises/bleeds easily.     Objective:     Vital Signs (Most Recent):  Temp: 98.3 °F (36.8 °C) (02/05/22 1144)  Pulse: 68 (02/05/22 1144)  Resp: 18 (02/05/22 1144)  BP: (!) 116/57  (02/05/22 1144)  SpO2: (!) 94 % (02/05/22 1144) Vital Signs (24h Range):  Temp:  [97.6 °F (36.4 °C)-98.3 °F (36.8 °C)] 98.3 °F (36.8 °C)  Pulse:  [61-78] 68  Resp:  [18-19] 18  SpO2:  [93 %-97 %] 94 %  BP: (106-121)/(55-57) 116/57     Weight: 99.6 kg (219 lb 9.3 oz)  Body mass index is 29.78 kg/m².    Intake/Output - Last 3 Shifts       02/03 0700  02/04 0659 02/04 0700  02/05 0659 02/05 0700  02/06 0659    P.O. 500 340     I.V. (mL/kg)       Blood       IV Piggyback 50      Total Intake(mL/kg) 550 (5.5) 340 (3.4)     Urine (mL/kg/hr) 1400 (0.6)      Other 4450      Stool 0      Total Output 5850      Net -5300 +340            Urine Occurrence  1 x 2 x    Stool Occurrence 0 x  0 x          Physical Exam  Vitals and nursing note reviewed.   Constitutional:       General: He is not in acute distress.     Appearance: Normal appearance.   HENT:      Head: Normocephalic and atraumatic.      Nose: No congestion.      Mouth/Throat:      Mouth: Mucous membranes are moist.   Eyes:      General: No scleral icterus.     Extraocular Movements: Extraocular movements intact.      Conjunctiva/sclera: Conjunctivae normal.      Pupils: Pupils are equal, round, and reactive to light.   Cardiovascular:      Rate and Rhythm: Normal rate and regular rhythm.      Pulses: Normal pulses.      Heart sounds: Murmur (systolic in nature) heard.       Pulmonary:      Effort: Pulmonary effort is normal. No respiratory distress.      Breath sounds: Normal breath sounds. No wheezing, rhonchi or rales.   Abdominal:      General: Bowel sounds are normal. There is no distension.      Palpations: Abdomen is soft.      Tenderness: There is no abdominal tenderness. There is no guarding.       Musculoskeletal:         General: Normal range of motion.      Cervical back: Normal range of motion and neck supple.      Right lower leg: No edema.      Left lower leg: No edema.   Skin:     General: Skin is warm and dry.      Findings: Bruising present.    Neurological:      General: No focal deficit present.      Mental Status: He is alert and oriented to person, place, and time. Mental status is at baseline.      Motor: No weakness.   Psychiatric:         Mood and Affect: Mood normal.         Behavior: Behavior normal.         Thought Content: Thought content normal.         Judgment: Judgment normal.         Laboratory:  Immunosuppressants         Stop Route Frequency     tacrolimus capsule 1 mg         -- Oral 2 times daily        CBC:   Recent Labs   Lab 02/05/22 0222   WBC 3.57*   RBC 2.54*   HGB 7.4*   HCT 23.4*      MCV 92   MCH 29.1   MCHC 31.6*     CMP:   Recent Labs   Lab 02/05/22 0222   *   CALCIUM 8.1*   ALBUMIN 2.6*   PROT 4.8*   *   K 4.1   CO2 25   CL 99   BUN 22   CREATININE 0.9   ALKPHOS 43*   ALT 11   AST 12   BILITOT 0.6     Labs within the past 24 hours have been reviewed.    Diagnostic Results:  I have personally reviewed all pertinent imaging studies.

## 2022-02-05 NOTE — PLAN OF CARE
Problem: Adult Inpatient Plan of Care  Goal: Plan of Care Review  Outcome: Ongoing, Progressing     Problem: Fluid Volume Deficit  Goal: Fluid Balance  Outcome: Ongoing, Progressing  Bleeding assessed and no signs of bleeding noted.      Problem: Diabetes Comorbidity  Goal: Blood Glucose Level Within Targeted Range  Outcome: Ongoing, Progressing  Cbg monitored to manage pt DM2 and insulin administered per MAR ordered.     Will continue to monitor pt.

## 2022-02-05 NOTE — ASSESSMENT & PLAN NOTE
- h/h decreased  - see anemia of unknown mechanism   - continue to monitor with CBC   - CT without definite bleed and occult blood stool negative   - CMV pending  - para with bloody fluid, 4.5L out, increased WBC/segs - started cefepime, now changed to cefpodoxime for 7 days   - monitor

## 2022-02-05 NOTE — PROGRESS NOTES
[facility] ([place] [address])  Liver Transplant  Progress Note    Patient Name: Tej Purdy  MRN: [id number]  Admission Date: 2/1/2022  Hospital Length of Stay: 4 days  Code Status: Full Code  Primary Care Provider: Thais Maxwell MD  Post-Operative Day: 30    ORGAN:   LIVER  Disease Etiology: Primary Liver Malignancy: Hepatoma (HCC) and Cirrhosis  Donor Type:   Donation after Brain Death  CDC High Risk:   No  Donor CMV Status:   Donor CMV Status: Positive  Donor HBcAB:   Negative  Donor HCV Status:   Negative  Donor HBV JAYLIN: Negative  Donor HCV JAYLIN: Negative  Whole or Partial: Whole Liver  Biliary Anastomosis: End to End  Arterial Anatomy: Replaced Left Hepatic from Left Gastric  Subjective:     History of Present Illness:  Mr. 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 Operation notable for replaced LHA, reconstructed. Post op course without complication. Patient with history of afib followed by cardiology who recommends continuing amiodarone with elliquis anticoagulation. 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 Patient to follow up with medical oncology for possible further treatment. Appointment scheduled for 1/26 cancelled due to testing positive for COVID19 1/26. He had an infusion for COVID on 1/27 without complication. Patient is being admitted for COVID treatment and monitoring. He was unable to make his lab appointments due to weakness. Patient reports generalized weakness, especially with activity, as well as decreased appetite, light headedness, and constipation. Denies cough, SOB, CP, palpitations, NVD, changes in urinary function. Due to recent transplant, infectious workup started on admission. Will treat COVID with remdesivir. Cellcept held for infection. Cardiac workup in process as well.       Hospital Course:  Mr. Purdy was admitted 2/1 for increasing weakness. Labs on admission revealed a h/h of 5.5/17.9. Has received 2 units  of PRBCs as of 2/2, h/h responded appropriately. ASA and elliquis held. CT abd/pelvis was attained to assess for hemorrhage. CT revealed new moderate abdominopelvic ascites that appears slightly complex in the pelvis. Para 2/3 with 4.5L of bloody fluid drained, labs with some concern for SBP, cefepime started. Occult blood stool negative. Cardiac w/u without acute findings, infectious w/u with BCs NGTD, clean UA. CXR with no acute cardiopulmonary disease.    Interval History   NAEON. Patient feeling ok this AM.  H/H stable.  Changed IV Abx to oral, will monitor if remains afebrile, then possible D/C on Sunday.  Thyroid studied normal. Replace electrolytes PRN. VSS, labs otherwise stable. CMV pending.           Scheduled Meds:   amiodarone  200 mg Oral Daily    amLODIPine  10 mg Oral Daily    cefpodoxime  400 mg Oral Q12H    docusate sodium  50 mg Oral BID    insulin aspart U-100  6 Units Subcutaneous TIDWM    insulin detemir U-100  24 Units Subcutaneous QHS    magnesium oxide  400 mg Oral BID    melatonin  6 mg Oral Nightly    pantoprazole  40 mg Oral Daily    pravastatin  20 mg Oral Daily    predniSONE  5 mg Oral Daily    sertraline  25 mg Oral Daily    sulfamethoxazole-trimethoprim 400-80mg  1 tablet Oral QAM    tacrolimus  1 mg Oral BID    tamsulosin  0.4 mg Oral QHS    valGANciclovir  450 mg Oral Daily     Continuous Infusions:  PRN Meds:sodium chloride, sodium chloride, acetaminophen, bisacodyL, bisacodyL, dextrose 50%, dextrose 50%, gabapentin, glucagon (human recombinant), glucose, glucose, insulin aspart U-100, ondansetron, polyethylene glycol, sodium chloride 0.9%    Review of Systems   Constitutional: Positive for activity change (low energy), appetite change (decreased) and fatigue. Negative for chills and fever.   HENT: Negative for congestion, sinus pressure and sore throat.    Respiratory: Negative for cough, chest tightness, shortness of breath and wheezing.    Cardiovascular: Negative  for chest pain, palpitations and leg swelling.   Gastrointestinal: Negative for abdominal distention, abdominal pain, constipation, diarrhea, nausea and vomiting.   Genitourinary: Negative for decreased urine volume, difficulty urinating, dysuria and frequency.   Musculoskeletal: Negative for arthralgias and back pain.   Skin: Negative for wound.   Allergic/Immunologic: Positive for immunocompromised state.   Neurological: Positive for weakness and light-headedness. Negative for dizziness, syncope and headaches.   Hematological: Bruises/bleeds easily.     Objective:     Vital Signs (Most Recent):  Temp: 98.3 °F (36.8 °C) (02/05/22 1144)  Pulse: 68 (02/05/22 1144)  Resp: 18 (02/05/22 1144)  BP: (!) 116/57 (02/05/22 1144)  SpO2: (!) 94 % (02/05/22 1144) Vital Signs (24h Range):  Temp:  [97.6 °F (36.4 °C)-98.3 °F (36.8 °C)] 98.3 °F (36.8 °C)  Pulse:  [61-78] 68  Resp:  [18-19] 18  SpO2:  [93 %-97 %] 94 %  BP: (106-121)/(55-57) 116/57     Weight: 99.6 kg (219 lb 9.3 oz)  Body mass index is 29.78 kg/m².    Intake/Output - Last 3 Shifts       02/03 0700  02/04 0659 02/04 0700  02/05 0659 02/05 0700  02/06 0659    P.O. 500 340     I.V. (mL/kg)       Blood       IV Piggyback 50      Total Intake(mL/kg) 550 (5.5) 340 (3.4)     Urine (mL/kg/hr) 1400 (0.6)      Other 4450      Stool 0      Total Output 5850      Net -5300 +340            Urine Occurrence  1 x 2 x    Stool Occurrence 0 x  0 x          Physical Exam  Vitals and nursing note reviewed.   Constitutional:       General: He is not in acute distress.     Appearance: Normal appearance.   HENT:      Head: Normocephalic and atraumatic.      Nose: No congestion.      Mouth/Throat:      Mouth: Mucous membranes are moist.   Eyes:      General: No scleral icterus.     Extraocular Movements: Extraocular movements intact.      Conjunctiva/sclera: Conjunctivae normal.      Pupils: Pupils are equal, round, and reactive to light.   Cardiovascular:      Rate and Rhythm: Normal  rate and regular rhythm.      Pulses: Normal pulses.      Heart sounds: Murmur (systolic in nature) heard.       Pulmonary:      Effort: Pulmonary effort is normal. No respiratory distress.      Breath sounds: Normal breath sounds. No wheezing, rhonchi or rales.   Abdominal:      General: Bowel sounds are normal. There is no distension.      Palpations: Abdomen is soft.      Tenderness: There is no abdominal tenderness. There is no guarding.       Musculoskeletal:         General: Normal range of motion.      Cervical back: Normal range of motion and neck supple.      Right lower leg: No edema.      Left lower leg: No edema.   Skin:     General: Skin is warm and dry.      Findings: Bruising present.   Neurological:      General: No focal deficit present.      Mental Status: He is alert and oriented to person, place, and time. Mental status is at baseline.      Motor: No weakness.   Psychiatric:         Mood and Affect: Mood normal.         Behavior: Behavior normal.         Thought Content: Thought content normal.         Judgment: Judgment normal.         Laboratory:  Immunosuppressants         Stop Route Frequency     tacrolimus capsule 1 mg         -- Oral 2 times daily        CBC:   Recent Labs   Lab 02/05/22 0222   WBC 3.57*   RBC 2.54*   HGB 7.4*   HCT 23.4*      MCV 92   MCH 29.1   MCHC 31.6*     CMP:   Recent Labs   Lab 02/05/22 0222   *   CALCIUM 8.1*   ALBUMIN 2.6*   PROT 4.8*   *   K 4.1   CO2 25   CL 99   BUN 22   CREATININE 0.9   ALKPHOS 43*   ALT 11   AST 12   BILITOT 0.6     Labs within the past 24 hours have been reviewed.    Diagnostic Results:  I have personally reviewed all pertinent imaging studies.      Assessment/Plan:     * Anemia due to unknown mechanism  - hgb 5.5 on admission   - ASA and elliquis held  - 2 units PRBCs transfused 2/2  - 1st unit with hgb response to 6   - 2nd unit response hgb ~8.5  - RBC studies with some type of potential hemolytic process, retics  elevated   - para with 4.5L bloody fluid out  - CMV NOT detected, occult blood stool negative  - monitor closely    Type 2 diabetes mellitus with hyperglycemia  - endocrine consulted, appreciate help    Weakness  - patient with covid 1/26   - missed labs due to weakness   - will treat covid  - w/u for other potential infectious process negative thus far   - EKG with NSR, troponin/bnp negative, cxr stable   - h/h low, see anemia of unknown origin   - PT ordered      Long-term use of immunosuppressant medication  - see prophylactic immunotherapy       COVID-19  - patient unable to obtain labs because of weakness   - tested positive 1/26  - infusion 1/27 without complication   - ID consulted for treatment recs - started remdesivir 2/1-2/3, continuing home PO pred   - Sats 92-94% on RA, CXR without evidence of cardiopulmonary disease  - cellcept held       Anemia of chronic disease  - h/h decreased  - see anemia of unknown mechanism   - continue to monitor with CBC   - CT without definite bleed and occult blood stool negative   - CMV pending  - para with bloody fluid, 4.5L out, increased WBC/segs - started cefepime, now changed to cefpodoxime for 7 days   - monitor      Prophylactic immunotherapy  - maintanence on prograf, cellcept, prednisone   - prograf levels monitored daily for toxicity, therapeutic levels   - cellcept held for covid infection     At risk for opportunistic infections  - continue OI prophylaxis per protocol       S/P liver transplant  - 67 y/o man s/p DBD LTx on 1/6/21 for 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 Patient to follow up with medical oncology for possible further treatment. Appointment scheduled for 1/26 cancelled due to testing positive for COVID19 1/26.    Persistent atrial fibrillation  - continue home amiodarone   - holding home  ban for decreased h/h  - admit EKG NSR    Type 2 diabetes mellitus  - monitor bg closely   - endocrine consulted, appreciate help    Diabetic neuropathy  - continuing home gabapentin      Hyperlipidemia  - continuing home statin      VTE Risk Mitigation (From admission, onward)         Ordered     IP VTE HIGH RISK PATIENT  Once         02/01/22 1449     Place sequential compression device  Until discontinued         02/01/22 1449                The patients clinical status was discussed at multidisplinary rounds, involving transplant surgery, transplant medicine, pharmacy, nursing, nutrition, and social work    Discharge Planning:  PharmD review: will need endocrine follow up ( 1/24/22)      GENE Hou  Liver Transplant  [facility] ([place])

## 2022-02-05 NOTE — PLAN OF CARE
Problem: Adult Inpatient Plan of Care  Goal: Plan of Care Review  Outcome: Ongoing, Progressing     Problem: Adult Inpatient Plan of Care  Goal: Patient-Specific Goal (Individualized)  Outcome: Ongoing, Progressing     Problem: Adult Inpatient Plan of Care  Goal: Absence of Hospital-Acquired Illness or Injury  Outcome: Ongoing, Progressing     Problem: Adult Inpatient Plan of Care  Goal: Optimal Comfort and Wellbeing  Outcome: Ongoing, Progressing     Problem: Adult Inpatient Plan of Care  Goal: Readiness for Transition of Care  Outcome: Ongoing, Progressing     Problem: Diabetes Comorbidity  Goal: Blood Glucose Level Within Targeted Range  Outcome: Ongoing, Progressing     Problem: Fluid Volume Deficit  Goal: Fluid Balance  Outcome: Ongoing, Progressing     Problem: Pain Acute  Goal: Acceptable Pain Control and Functional Ability  Outcome: Ongoing, Progressing     Problem: Fall Injury Risk  Goal: Absence of Fall and Fall-Related Injury  Outcome: Ongoing, Progressing   Patient AAOX4 no acute distress noted denies any complaint of pain or discomfort remains afebrile throughout this shift. safety measures maintained will cont to monitor.

## 2022-02-06 VITALS
HEIGHT: 72 IN | DIASTOLIC BLOOD PRESSURE: 56 MMHG | OXYGEN SATURATION: 94 % | HEART RATE: 69 BPM | SYSTOLIC BLOOD PRESSURE: 120 MMHG | BODY MASS INDEX: 29.74 KG/M2 | TEMPERATURE: 98 F | WEIGHT: 219.56 LBS | RESPIRATION RATE: 16 BRPM

## 2022-02-06 LAB
ALBUMIN SERPL BCP-MCNC: 2.7 G/DL (ref 3.5–5.2)
ALP SERPL-CCNC: 47 U/L (ref 55–135)
ALT SERPL W/O P-5'-P-CCNC: 10 U/L (ref 10–44)
ANION GAP SERPL CALC-SCNC: 7 MMOL/L (ref 8–16)
AST SERPL-CCNC: 11 U/L (ref 10–40)
BACTERIA BLD CULT: NORMAL
BACTERIA BLD CULT: NORMAL
BASOPHILS # BLD AUTO: 0.02 K/UL (ref 0–0.2)
BASOPHILS NFR BLD: 0.5 % (ref 0–1.9)
BILIRUB SERPL-MCNC: 0.6 MG/DL (ref 0.1–1)
BUN SERPL-MCNC: 21 MG/DL (ref 8–23)
CALCIUM SERPL-MCNC: 8.2 MG/DL (ref 8.7–10.5)
CHLORIDE SERPL-SCNC: 103 MMOL/L (ref 95–110)
CO2 SERPL-SCNC: 26 MMOL/L (ref 23–29)
CREAT SERPL-MCNC: 0.9 MG/DL (ref 0.5–1.4)
DIFFERENTIAL METHOD: ABNORMAL
EOSINOPHIL # BLD AUTO: 0 K/UL (ref 0–0.5)
EOSINOPHIL NFR BLD: 0.7 % (ref 0–8)
ERYTHROCYTE [DISTWIDTH] IN BLOOD BY AUTOMATED COUNT: 15.3 % (ref 11.5–14.5)
EST. GFR  (AFRICAN AMERICAN): >60 ML/MIN/1.73 M^2
EST. GFR  (NON AFRICAN AMERICAN): >60 ML/MIN/1.73 M^2
GLUCOSE SERPL-MCNC: 200 MG/DL (ref 70–110)
HCT VFR BLD AUTO: 24.5 % (ref 40–54)
HGB BLD-MCNC: 7.8 G/DL (ref 14–18)
IMM GRANULOCYTES # BLD AUTO: 0.04 K/UL (ref 0–0.04)
IMM GRANULOCYTES NFR BLD AUTO: 1 % (ref 0–0.5)
LYMPHOCYTES # BLD AUTO: 0.5 K/UL (ref 1–4.8)
LYMPHOCYTES NFR BLD: 13.1 % (ref 18–48)
MAGNESIUM SERPL-MCNC: 1.6 MG/DL (ref 1.6–2.6)
MCH RBC QN AUTO: 29.1 PG (ref 27–31)
MCHC RBC AUTO-ENTMCNC: 31.8 G/DL (ref 32–36)
MCV RBC AUTO: 91 FL (ref 82–98)
MONOCYTES # BLD AUTO: 0.4 K/UL (ref 0.3–1)
MONOCYTES NFR BLD: 10.9 % (ref 4–15)
NEUTROPHILS # BLD AUTO: 3 K/UL (ref 1.8–7.7)
NEUTROPHILS NFR BLD: 73.8 % (ref 38–73)
NRBC BLD-RTO: 0 /100 WBC
PLATELET # BLD AUTO: 171 K/UL (ref 150–450)
PMV BLD AUTO: 10.3 FL (ref 9.2–12.9)
POCT GLUCOSE: 110 MG/DL (ref 70–110)
POCT GLUCOSE: 160 MG/DL (ref 70–110)
POTASSIUM SERPL-SCNC: 4.2 MMOL/L (ref 3.5–5.1)
PROT SERPL-MCNC: 4.9 G/DL (ref 6–8.4)
RBC # BLD AUTO: 2.68 M/UL (ref 4.6–6.2)
SODIUM SERPL-SCNC: 136 MMOL/L (ref 136–145)
TACROLIMUS BLD-MCNC: 6.9 NG/ML (ref 5–15)
WBC # BLD AUTO: 4.05 K/UL (ref 3.9–12.7)

## 2022-02-06 PROCEDURE — 80053 COMPREHEN METABOLIC PANEL: CPT

## 2022-02-06 PROCEDURE — 99238 HOSP IP/OBS DSCHRG MGMT 30/<: CPT | Mod: ,,, | Performed by: NURSE PRACTITIONER

## 2022-02-06 PROCEDURE — 99900035 HC TECH TIME PER 15 MIN (STAT)

## 2022-02-06 PROCEDURE — 83735 ASSAY OF MAGNESIUM: CPT

## 2022-02-06 PROCEDURE — 25000003 PHARM REV CODE 250: Performed by: NURSE PRACTITIONER

## 2022-02-06 PROCEDURE — 99238 PR HOSPITAL DISCHARGE DAY,<30 MIN: ICD-10-PCS | Mod: ,,, | Performed by: NURSE PRACTITIONER

## 2022-02-06 PROCEDURE — 63600175 PHARM REV CODE 636 W HCPCS

## 2022-02-06 PROCEDURE — 85025 COMPLETE CBC W/AUTO DIFF WBC: CPT

## 2022-02-06 PROCEDURE — 97161 PT EVAL LOW COMPLEX 20 MIN: CPT

## 2022-02-06 PROCEDURE — 94761 N-INVAS EAR/PLS OXIMETRY MLT: CPT

## 2022-02-06 PROCEDURE — 80197 ASSAY OF TACROLIMUS: CPT

## 2022-02-06 PROCEDURE — 25000003 PHARM REV CODE 250

## 2022-02-06 PROCEDURE — 36415 COLL VENOUS BLD VENIPUNCTURE: CPT

## 2022-02-06 PROCEDURE — 25000003 PHARM REV CODE 250: Performed by: PHYSICIAN ASSISTANT

## 2022-02-06 RX ORDER — MYCOPHENOLATE MOFETIL 250 MG/1
1000 CAPSULE ORAL 2 TIMES DAILY
Qty: 240 CAPSULE | Refills: 2 | Status: SHIPPED | OUTPATIENT
Start: 2022-02-11 | End: 2022-04-18

## 2022-02-06 RX ORDER — CEFPODOXIME PROXETIL 200 MG/1
400 TABLET, FILM COATED ORAL EVERY 12 HOURS
Qty: 16 TABLET | Refills: 0 | Status: SHIPPED | OUTPATIENT
Start: 2022-02-06 | End: 2022-02-10

## 2022-02-06 RX ORDER — LANOLIN ALCOHOL/MO/W.PET/CERES
400 CREAM (GRAM) TOPICAL 2 TIMES DAILY
Qty: 60 TABLET | Refills: 5 | Status: SHIPPED | OUTPATIENT
Start: 2022-02-06 | End: 2022-04-05

## 2022-02-06 RX ORDER — TACROLIMUS 1 MG/1
2 CAPSULE ORAL 2 TIMES DAILY
Status: DISCONTINUED | OUTPATIENT
Start: 2022-02-06 | End: 2022-02-06 | Stop reason: HOSPADM

## 2022-02-06 RX ORDER — CEFPODOXIME PROXETIL 200 MG/1
400 TABLET, FILM COATED ORAL EVERY 12 HOURS
Status: DISCONTINUED | OUTPATIENT
Start: 2022-02-06 | End: 2022-02-06 | Stop reason: HOSPADM

## 2022-02-06 RX ADMIN — DOCUSATE SODIUM 50 MG: 50 CAPSULE, LIQUID FILLED ORAL at 08:02

## 2022-02-06 RX ADMIN — PREDNISONE 5 MG: 5 TABLET ORAL at 08:02

## 2022-02-06 RX ADMIN — INSULIN ASPART 6 UNITS: 100 INJECTION, SOLUTION INTRAVENOUS; SUBCUTANEOUS at 08:02

## 2022-02-06 RX ADMIN — SULFAMETHOXAZOLE AND TRIMETHOPRIM 1 TABLET: 400; 80 TABLET ORAL at 07:02

## 2022-02-06 RX ADMIN — CEFPODOXIME PROXETIL 400 MG: 200 TABLET, FILM COATED ORAL at 08:02

## 2022-02-06 RX ADMIN — TACROLIMUS 1 MG: 1 CAPSULE ORAL at 07:02

## 2022-02-06 RX ADMIN — APIXABAN 5 MG: 5 TABLET, FILM COATED ORAL at 11:02

## 2022-02-06 RX ADMIN — VALGANCICLOVIR 450 MG: 450 TABLET, FILM COATED ORAL at 08:02

## 2022-02-06 RX ADMIN — PRAVASTATIN SODIUM 20 MG: 20 TABLET ORAL at 08:02

## 2022-02-06 RX ADMIN — AMIODARONE HYDROCHLORIDE 200 MG: 200 TABLET ORAL at 08:02

## 2022-02-06 RX ADMIN — Medication 400 MG: at 08:02

## 2022-02-06 RX ADMIN — SERTRALINE HYDROCHLORIDE 25 MG: 25 TABLET ORAL at 08:02

## 2022-02-06 RX ADMIN — INSULIN ASPART 6 UNITS: 100 INJECTION, SOLUTION INTRAVENOUS; SUBCUTANEOUS at 01:02

## 2022-02-06 RX ADMIN — PANTOPRAZOLE SODIUM 40 MG: 40 TABLET, DELAYED RELEASE ORAL at 08:02

## 2022-02-06 NOTE — PT/OT/SLP EVAL
Physical Therapy Evaluation    Patient Name:  Tej Purdy   MRN:  [id number]    Recommendations:     Discharge Recommendations:  home health PT   Discharge Equipment Recommendations: none   Barriers to discharge: Inaccessible home (1 flight of stairs to enter home)    Assessment:     Tej Purdy is a 66 y.o. male admitted with a medical diagnosis of Anemia due to unknown mechanism.  He presents with the following impairments/functional limitations:  weakness,impaired balance,impaired endurance,impaired cardiopulmonary response to activity,impaired functional mobilty,gait instability. Pt completing functional mobility without physical assist or use of DME. Ambulated short household distance within room without LOB or major gait deviations noted. However, pt with onset of lightheadedness during gait trial, limiting further progression. Pt would continue to benefit from skilled acute PT in order to address current deficits and progress functional mobility.     Rehab Prognosis: Good; patient would benefit from acute skilled PT services to address these deficits and reach maximum level of function.    Recent Surgery: * No surgery found *      Plan:     During this hospitalization, patient to be seen 3 x/week to address the identified rehab impairments via gait training,therapeutic activities,therapeutic exercises,neuromuscular re-education and progress toward the following goals:    · Plan of Care Expires:  03/07/22    Subjective     Chief Complaint: lightheadedness during ambulation  Patient/Family Comments/goals: return home  Pain/Comfort:  · Pain Rating 1: 0/10    Patients cultural, spiritual, Jainism conflicts given the current situation: no    Living Environment:  Pt lives with his S.O. in a 2nd floor apt with 1 flight of stairs to enter, L handrail.   Prior to admission, patients level of function was independent with ambulation and ADLs. Pt recently d/c home following liver transplant 1/6/22, and has  required some assist with ADLs and mobility during recovery. Equipment used at home: cane, straight.  DME owned (not currently used): none.  Upon discharge, patient will have assistance from S.O..    Objective:     Communicated with RN prior to session.  Patient found supine with telemetry  upon PT entry to room.    General Precautions: Standard, fall,airborne,contact,droplet   Orthopedic Precautions:N/A   Braces: N/A  Respiratory Status: Room air    Exams:  · Cognitive Exam:  Patient is oriented to Person, Place, Time and Situation  · Sensation:    · -       Intact  · RLE ROM: WFL  · RLE Strength: WFL  · LLE ROM: WFL  · LLE Strength: WFL    Functional Mobility:  · Bed Mobility:     · Supine to Sit: supervision with HOB elevated and using bed rail   · Transfers:     · Sit to Stand:  stand by assistance with no AD from EOB   · Gait: ~25 ft. within room with SBA without AD  · No LOB or major deviations noted  · Onset of lightheadedness reported during gait trial, requiring return to chair     Therapeutic Activities and Exercises:  Pt educated on role of PT and PT POC. Pt verbalized understanding.   Pt educated on the effects of bed rest and the importance of OOB activity. Pt encouraged to sit UIC majority of day as tolerated and continue daily ambulation with nursing assist. Pt verbalized understanding.  Pt oriented to call bell and instructed to call for staff assist with standing tasks/transfers. Pt verbalized understanding.     AM-PAC 6 CLICK MOBILITY  Total Score:20     Patient left up in chair with all lines intact, call button in reach and RN notified.    GOALS:   Multidisciplinary Problems     Physical Therapy Goals        Problem: Physical Therapy Goal    Goal Priority Disciplines Outcome Goal Variances Interventions   Physical Therapy Goal     PT, PT/OT Ongoing, Progressing     Description: Goals to be met by: 2022     Patient will increase functional independence with mobility by performin.  Supine to sit with Modified [place]  2. Sit to stand transfer with Supervision  3. Gait  x 150 feet with Supervision without AD or using LRAD as needed.   4. Ascend/descend 1 flight of stairs with left Handrails Stand-by Assistance.   5. Lower extremity exercise program x15 reps, with supervision, in order to increase LE strength and (I) with functional mobility.                       History:     Past Medical History:   Diagnosis Date    Atrial fibrillation     Cirrhosis 11/23/2020    Diabetes mellitus, type 2     Diabetic neuropathy 11/24/2020    Disorder of kidney and ureter     HTN (hypertension) 11/23/2020    Hyperlipidemia 11/23/2020    Kidney stones 11/23/2020    S/p lithotripsy 2020    Leukocytosis 1/15/2021    Liver mass 11/23/2020    CASTILLO (nonalcoholic steatohepatitis) 11/23/2020    PAD (peripheral artery disease)     Portal hypertension 11/23/2020    Skin cancer 11/23/2020       Past Surgical History:   Procedure Laterality Date    COLONOSCOPY  10/2020    ESOPHAGOGASTRODUODENOSCOPY  10/2020    ESOPHAGOGASTRODUODENOSCOPY N/A 7/1/2021    Procedure: EGD (ESOPHAGOGASTRODUODENOSCOPY);  Surgeon: Jovan David MD;  [facility]  Service: Endoscopy;  Laterality: N/A;  HCC. Listed for liver transplant. EGD for variceal surveillance.  cardiac clearance and blood thinenr approval received, see telephone encounter 6/23/21-BB  fully vaccinated-BB  labs same day of procedure-BB    EXCISION OF HYDROCELE Right     hemorroid surgery      hemorroidectomy      KIDNEY STONE SURGERY      LEFT HEART CATHETERIZATION N/A 1/27/2021    Procedure: Left heart cath;  Surgeon: Kris Shelton MD;  Location: [facility] CATH LAB;  Service: Cardiology;  Laterality: N/A;    liver mass removal      LIVER TRANSPLANT N/A 1/6/2022    Procedure: TRANSPLANT, LIVER;  Surgeon: Lizet Garcia MD;  Location: [facility] OR 2ND FLR;  Service: Transplant;  Laterality: N/A;    RIGHT HEART CATHETERIZATION  Right 5/7/2021    Procedure: INSERTION, CATHETER, RIGHT HEART;  Surgeon: Jaden Schuster MD;  Location: [facility] CATH LAB;  Service: Cardiology;  Laterality: Right;    TREATMENT OF CARDIAC ARRHYTHMIA N/A 5/19/2021    Procedure: CARDIOVERSION;  Surgeon: Didier Tilley MD;  Location: [facility] EP LAB;  Service: Cardiology;  Laterality: N/A;  a fib, dccv/johny, mac, dm. sscu    TREATMENT OF CARDIAC ARRHYTHMIA N/A 5/31/2021    Procedure: CARDIOVERSION;  Surgeon: Daneil Arambula MD;  Location: [facility] EP LAB;  Service: Cardiology;  Laterality: N/A;  afib, DCCV, anest., DM, 3 prep    TREATMENT OF CARDIAC ARRHYTHMIA N/A 7/7/2021    Procedure: Cardioversion or Defibrillation;  Surgeon: Didier Tilley MD;  Location: [facility] EP LAB;  Service: Cardiology;  Laterality: N/A;  AF, JOHNY (cancel if complaint), DCCV, MAC, DM, 3 Prep    TREATMENT OF CARDIAC ARRHYTHMIA N/A 7/27/2021    Procedure: Cardioversion or Defibrillation;  Surgeon: Didier Tilley MD;  Location: [facility] EP LAB;  Service: Cardiology;  Laterality: N/A;  AF, JOHNY (cx if complaint), DCCV, MAC, DM, 3 Prep       Time Tracking:     PT Received On: 02/06/22  PT Start Time: 1144     PT Stop Time: 1200  PT Total Time (min): 16 min     Billable Minutes: Evaluation 16      02/06/2022

## 2022-02-06 NOTE — NURSING
Nurse went to bedside to ask if pt wants to shower before discharge but pt refused. He said he rather shower at home.

## 2022-02-06 NOTE — PLAN OF CARE
PT evaluation complete and appropriate goals established.    2022    Problem: Physical Therapy Goal  Goal: Physical Therapy Goal  Description: Goals to be met by: 2022     Patient will increase functional independence with mobility by performin. Supine to sit with Modified [place]  2. Sit to stand transfer with Supervision  3. Gait  x 150 feet with Supervision without AD or using LRAD as needed.   4. Ascend/descend 1 flight of stairs with left Handrails Stand-by Assistance.   5. Lower extremity exercise program x15 reps, with supervision, in order to increase LE strength and (I) with functional mobility.      Outcome: Ongoing, Progressing

## 2022-02-06 NOTE — CARE UPDATE
"BG goal 140 -180   Diet diabetic [facility]; 2000 Calorie; Isolation Tray - Styrofoam  Diet Adult Regular       BG stable while on current SQ insulin regimen, which is comparable to home insulin regimen. Endo will continue to follow, and manage glycemic control while inpatient.     - Continue Levemir 24 units HS.   - Continue Novolog 6 units TIDWM.   - Low Dose SQ Insulin Correction Scale.  - BG Monitoring AC/HS     ** Please call Endocrine for any BG related issues **  ** Please notify Endocrine for any change and/or advance in diet**    Lab Results   Component Value Date    HGBA1C 5.8 (H) 01/05/2022       Discharge Planning:   TBD. Please notify endocrinology prior to discharge.     -  Lantus to 24 units HS.  - Continue Humalog 6 units in addition to the following. Correction scale:    150 - 200 + 1 unit    201 - 250 + 2 units    251 - 300 + 3 units    301 - 350 + 4 units     > 350   + 5 units  - Insurance preferred diabetes testing supplies  - Ultra-Fine Sarah Pen Needles 4mm x 32 G (5/32" x 0.23mm).   - Patient is to monitor BG ACHS, and follow-up with PCP for continued DM control and maintenance.         "

## 2022-02-06 NOTE — PROGRESS NOTES
Discharge Medication Note:    Hospital Course: PIO Purdy is a 66 y.o. 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   Admitted 2/1 for weakness, discovered to also have COVID, being treated with remdesivir.  Paracentesis done on 2/4 concerning for SBP, to complete 7 days with cefpodoxime.      Met with Tej Purdy at discharge to review discharge medications and to update the blue medication card.           Medication List        START taking these medications      cefpodoxime 200 MG tablet  Commonly known as: VANTIN  Take 2 tablets (400 mg total) by mouth every 12 (twelve) hours. for 4 days     magnesium oxide 400 mg (241.3 mg magnesium) tablet  Commonly known as: MAG-OX  Take 1 tablet (400 mg total) by mouth 2 (two) times daily.     pulse oximeter device  Commonly known as: pulse oximeter  by Apply Externally route 2 (two) times a day. Use twice daily at 8 AM and 3 PM and record the value in [facility] as directed.            CHANGE how you take these medications      mycophenolate 250 mg Cap  Commonly known as: CELLCEPT  Take 4 capsules (1,000 mg total) by mouth 2 (two) times daily. Resume on 2/11/22  Start taking on: February 11, 2022  What changed:   additional instructions  These instructions start on February 11, 2022. If you are unsure what to do until then, ask your doctor or other care provider.            CONTINUE taking these medications      amiodarone 200 MG Tab  Commonly known as: PACERONE  Take 1 tablet (200 mg total) by mouth once daily.     amLODIPine 10 MG tablet  Commonly known as: NORVASC  Take 1 tablet (10 mg total) by mouth once daily.     aspirin 81 MG EC tablet  Commonly known as: ECOTRIN     blood sugar diagnostic Strp  To check BG 2 times daily, to use with insurance preferred meter     blood-glucose meter kit  To check BG 2 times daily, to use with insurance preferred meter     RADHA-GEST  "ANTACID 200 mg calcium (500 mg) chewable tablet  Generic drug: calcium carbonate  Take 1 tablet (500 mg total) by mouth 3 (three) times daily as needed for Heartburn.     calcium carbonate-vitamin D3 600 mg-20 mcg (800 unit) Tab  Take 1 tablet by mouth once daily.     ELIQUIS 5 mg Tab  Generic drug: apixaban  Take 1 tablet (5 mg total) by mouth 2 (two) times daily.     fish oil-omega-3 fatty acids 300-1,000 mg capsule     gabapentin 300 MG capsule  Commonly known as: NEURONTIN  Take 1 capsule (300 mg total) by mouth 3 (three) times daily as needed (neuropathy).     HumaLOG KwikPen Insulin 100 unit/mL pen  Generic drug: insulin lispro  Inject 6 Units into the skin 3 (three) times daily. Plus sliding scale, MDD: 48 units     lancets Misc  To check BG 2 times daily, to use with insurance preferred meter     LANTUS SOLOSTAR U-100 INSULIN glargine 100 units/mL (3mL) SubQ pen  Generic drug: insulin  Inject 24 Units into the skin every evening.     lovastatin 20 MG tablet  Commonly known as: MEVACOR     multivitamin capsule     oxyCODONE 10 mg Tab immediate release tablet  Commonly known as: ROXICODONE  Take 1 tablet (10 mg total) by mouth every 4 (four) hours as needed for Pain.     pantoprazole 40 MG tablet  Commonly known as: PROTONIX  Take 1 tablet (40 mg total) by mouth once daily.     pen needle, diabetic 32 gauge x 5/32" Ndle  Commonly known as: BD ULTRA-FINE GÉNESIS PEN NEEDLE  Use with insulin once daily     predniSONE 5 MG tablet  Commonly known as: DELTASONE  Take by mouth daily:  20mg 1/12-1/18, 15mg 1/19-1/25, 10mg 1/26-2/1, 5mg 2/2-2/8, STOP 2/9/22     sertraline 25 MG tablet  Commonly known as: ZOLOFT  Take 1 tablet (25 mg total) by mouth once daily.     sulfamethoxazole-trimethoprim 400-80mg 400-80 mg per tablet  Commonly known as: BACTRIM,SEPTRA  Take 1 tablet by mouth every morning. STOP 7/7/22     tacrolimus 1 MG Cap  Commonly known as: PROGRAF  Take 2 capsules (2 mg total) by mouth every 12 (twelve) " hours.     tamsulosin 0.4 mg Cap  Commonly known as: FLOMAX  Take 1 capsule (0.4 mg total) by mouth every evening.     valGANciclovir 450 mg Tab  Commonly known as: VALCYTE  Take 1 tablet (450 mg total) by mouth once daily. STOP 7/7/22            STOP taking these medications      nystatin 100,000 unit/mL suspension  Commonly known as: MYCOSTATIN               Where to Get Your Medications        These medications were sent to [facility]  [address] [place], [place] [address] [address]      Hours: Mon-Fri 7a-7p, Sat-Sun 10a-4p Phone: [phone]   cefpodoxime 200 MG tablet  magnesium oxide 400 mg (241.3 mg magnesium) tablet  mycophenolate 250 mg Cap  pulse oximeter device            The following medications have been placed on HOLD and should be restarted in the outpatient setting (when appropriate): MMF to resume on 2/11    Tej Purdy verbalized understanding and had the opportunity to ask questions.

## 2022-02-06 NOTE — DISCHARGE SUMMARY
[facility] - [facility] ([place] [address])  Liver Transplant  Discharge Summary      Patient Name: Tej Purdy  MRN: [id number]  Admission Date: 2/1/2022  Hospital Length of Stay: 5 days  Discharge Date and Time:  02/06/2022 10:59 AM  Attending Physician: Melvin Lozada MD   Discharging Provider: GENE Hou  Primary Care Provider: Thais Maxwell MD  Post-Operative Day: 31     ORGAN:   LIVER  Disease Etiology: Primary Liver Malignancy: Hepatoma (HCC) and Cirrhosis  Donor Type:   Donation after Brain Death  CDC High Risk:   No  Donor CMV Status:   Donor CMV Status: Positive  Donor HBcAB:   Negative  Donor HCV Status:   Negative  Whole or Partial: Whole Liver  Biliary Anastomosis: End to End  Arterial Anatomy: Replaced Left Hepatic from Left Gastric    HPI:   Mr. 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 Operation notable for replaced LHA, reconstructed. Post op course without complication. Patient with history of afib followed by cardiology who recommends continuing amiodarone with elliquis anticoagulation. 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 Patient to follow up with medical oncology for possible further treatment. Appointment scheduled for 1/26 cancelled due to testing positive for COVID19 1/26. He had an infusion for COVID on 1/27 without complication. Patient is being admitted for COVID treatment and monitoring. He was unable to make his lab appointments due to weakness. Patient reports generalized weakness, especially with activity, as well as decreased appetite, light headedness, and constipation. Denies cough, SOB, CP, palpitations, NVD, changes in urinary function. Due to recent transplant, infectious workup started on admission. Will treat COVID with remdesivir. Cellcept held for infection. Cardiac workup in process as well.     Hospital Course:    Mr. Purdy was admitted 2/1 for increasing weakness. Labs on admission revealed a h/h of  5.5/17.9. Has received 2 units of PRBCs as of 2/2, h/h responded appropriately. ASA and elliquis held. CT abd/pelvis was attained to assess for hemorrhage. CT revealed new moderate abdominopelvic ascites that appears slightly complex in the pelvis. Para 2/3 with 4.5L of bloody fluid drained, labs with some concern for SBP, cefepime started. Occult blood stool negative. Cardiac w/u without acute findings, infectious w/u with BCs NGTD, clean UA. CXR with no acute cardiopulmonary disease. Now transitioned to cefpoxodine  on 2/5 for 7 days Patient has remained afebrile. At discharge resumed ASA and apixaban.  Patient reports feeling stronger OF NOTE: Resume Cellcept on 2/11.   Discharge instructions reviewed by pharmacist, nursing and transplant coordinator.  Patient  verbalized understanding and in agreement with discharge from hospital today.  Patient is medically stable for discharge. Patient will f/u with labs per transplant coordinator.             Goals of Care Treatment Preferences:  Code Status: Full Code      Final Active Diagnoses:    Diagnosis Date Noted POA    PRINCIPAL PROBLEM:  Anemia due to unknown mechanism [D64.9] 02/02/2022 Yes    COVID-19 [U07.1] 02/01/2022 Yes    Long-term use of immunosuppressant medication [Z79.899] 02/01/2022 Not Applicable    Weakness [R53.1] 02/01/2022 Yes    Type 2 diabetes mellitus with hyperglycemia [E11.65] 02/01/2022 Yes    Anemia of chronic disease [D63.8] 01/10/2022 Yes    Prophylactic immunotherapy [Z29.8] 01/06/2022 Not Applicable    At risk for opportunistic infections [Z91.89] 01/06/2022 Yes    S/P liver transplant [Z94.4] 01/06/2022 Not Applicable    Persistent atrial fibrillation [I48.19] 05/14/2021 Yes    Diabetic neuropathy [E11.40] 11/24/2020 Yes    Hyperlipidemia [E78.5] 11/23/2020 Yes      Problems Resolved During this Admission:    Diagnosis Date Noted Date Resolved POA    Atrial fibrillation [I48.91]  02/02/2022 Yes       Consults (From  admission, onward)        Status Ordering Provider     Inpatient consult to Endocrinology  Once        Provider:  (Not yet assigned)    Completed TARIQ LYONS     Inpatient consult to Infectious Diseases  Once        Provider:  (Not yet assigned)    Completed TARIQ LYONS          Pending Diagnostic Studies:     Procedure Component Value Units Date/Time    G6PD,Quantitative [[id number]] Collected: 02/01/22 1557    Order Status: Sent Lab Status: In process Updated: 02/01/22 1557    Specimen: Blood         Significant Diagnostic Studies: Labs:   BMP:   Recent Labs   Lab 02/05/22 0222 02/06/22 0259   * 200*   * 136   K 4.1 4.2   CL 99 103   CO2 25 26   BUN 22 21   CREATININE 0.9 0.9   CALCIUM 8.1* 8.2*   MG 1.7 1.6   , CMP   Recent Labs   Lab 02/05/22 0222 02/06/22 0259   * 136   K 4.1 4.2   CL 99 103   CO2 25 26   * 200*   BUN 22 21   CREATININE 0.9 0.9   CALCIUM 8.1* 8.2*   PROT 4.8* 4.9*   ALBUMIN 2.6* 2.7*   BILITOT 0.6 0.6   ALKPHOS 43* 47*   AST 12 11   ALT 11 10   ANIONGAP 7* 7*   ESTGFRAFRICA >60.0 >60.0   EGFRNONAA >60.0 >60.0   , CBC   Recent Labs   Lab 02/05/22 0222 02/05/22 0222 02/06/22 0259   WBC 3.57*  --  4.05   HGB 7.4*  --  7.8*   HCT 23.4*   < > 24.5*     --  171    < > = values in this interval not displayed.    and All labs within the past 24 hours have been reviewed    The patients clinical status was discussed at multidisplinary rounds, involving transplant surgery, transplant medicine, pharmacy, nursing, nutrition, and social work    Discharged Condition: stable    Disposition:     Follow Up:    Patient Instructions:      Diet Adult Regular     Notify your health care provider if you experience any of the following:     Notify your health care provider if you experience any of the following:  increased confusion or weakness     Notify your health care provider if you experience any of the following:  persistent dizziness,  light-headedness, or visual disturbances     Notify your health care provider if you experience any of the following:  worsening rash     Notify your health care provider if you experience any of the following:  severe persistent headache     Notify your health care provider if you experience any of the following:  difficulty breathing or increased cough     Notify your health care provider if you experience any of the following:  redness, tenderness, or signs of infection (pain, swelling, redness, odor or green/yellow discharge around incision site)     Notify your health care provider if you experience any of the following:  severe uncontrolled pain     Notify your health care provider if you experience any of the following:  persistent nausea and vomiting or diarrhea     Notify your health care provider if you experience any of the following:  temperature >100.4     COVID-19 Surveillance Program     Order Specific Question Answer Comments   Does patient have a smartphone? Yes    Does patient have the [facility] on their smartphone? Yes    While in surveillance program, will patient be using home oxygen? No      Activity as tolerated     Medications:  Reconciled Home Medications:      Medication List      START taking these medications    cefpodoxime 200 MG tablet  Commonly known as: VANTIN  Take 2 tablets (400 mg total) by mouth every 12 (twelve) hours. for 4 days     magnesium oxide 400 mg (241.3 mg magnesium) tablet  Commonly known as: MAG-OX  Take 1 tablet (400 mg total) by mouth 2 (two) times daily.     pulse oximeter device  Commonly known as: pulse oximeter  by Apply Externally route 2 (two) times a day. Use twice daily at 8 AM and 3 PM and record the value in MyChart as directed.        CHANGE how you take these medications    mycophenolate 250 mg Cap  Commonly known as: CELLCEPT  Take 4 capsules (1,000 mg total) by mouth 2 (two) times daily. Resume on 2/11/22  Start taking on: February 11, 2022  What  changed:   · additional instructions  · These instructions start on February 11, 2022. If you are unsure what to do until then, ask your doctor or other care provider.        CONTINUE taking these medications    amiodarone 200 MG Tab  Commonly known as: PACERONE  Take 1 tablet (200 mg total) by mouth once daily.     amLODIPine 10 MG tablet  Commonly known as: NORVASC  Take 1 tablet (10 mg total) by mouth once daily.     aspirin 81 MG EC tablet  Commonly known as: ECOTRIN  Take 81 mg by mouth once daily.     blood sugar diagnostic Strp  To check BG 2 times daily, to use with insurance preferred meter     blood-glucose meter kit  To check BG 2 times daily, to use with insurance preferred meter     RADHA-GEST ANTACID 200 mg calcium (500 mg) chewable tablet  Generic drug: calcium carbonate  Take 1 tablet (500 mg total) by mouth 3 (three) times daily as needed for Heartburn.     calcium carbonate-vitamin D3 600 mg-20 mcg (800 unit) Tab  Take 1 tablet by mouth once daily.     ELIQUIS 5 mg Tab  Generic drug: apixaban  Take 1 tablet (5 mg total) by mouth 2 (two) times daily.     fish oil-omega-3 fatty acids 300-1,000 mg capsule  Take 1 capsule by mouth once daily.     gabapentin 300 MG capsule  Commonly known as: NEURONTIN  Take 1 capsule (300 mg total) by mouth 3 (three) times daily as needed (neuropathy).     HumaLOG KwikPen Insulin 100 unit/mL pen  Generic drug: insulin lispro  Inject 6 Units into the skin 3 (three) times daily. Plus sliding scale, MDD: 48 units     lancets Misc  To check BG 2 times daily, to use with insurance preferred meter     LANTUS SOLOSTAR U-100 INSULIN glargine 100 units/mL (3mL) SubQ pen  Generic drug: insulin  Inject 24 Units into the skin every evening.     lovastatin 20 MG tablet  Commonly known as: MEVACOR  Take 20 mg by mouth once daily.     multivitamin capsule  Take 1 capsule by mouth once daily.     oxyCODONE 10 mg Tab immediate release tablet  Commonly known as: ROXICODONE  Take 1 tablet  "(10 mg total) by mouth every 4 (four) hours as needed for Pain.     pantoprazole 40 MG tablet  Commonly known as: PROTONIX  Take 1 tablet (40 mg total) by mouth once daily.     pen needle, diabetic 32 gauge x 5/32" Ndle  Commonly known as: BD ULTRA-FINE GÉNESIS PEN NEEDLE  Use with insulin once daily     predniSONE 5 MG tablet  Commonly known as: DELTASONE  Take by mouth daily:  20mg 1/12-1/18, 15mg 1/19-1/25, 10mg 1/26-2/1, 5mg 2/2-2/8, STOP 2/9/22     sertraline 25 MG tablet  Commonly known as: ZOLOFT  Take 1 tablet (25 mg total) by mouth once daily.     sulfamethoxazole-trimethoprim 400-80mg 400-80 mg per tablet  Commonly known as: BACTRIM,SEPTRA  Take 1 tablet by mouth every morning. STOP 7/7/22     tacrolimus 1 MG Cap  Commonly known as: PROGRAF  Take 2 capsules (2 mg total) by mouth every 12 (twelve) hours.     tamsulosin 0.4 mg Cap  Commonly known as: FLOMAX  Take 1 capsule (0.4 mg total) by mouth every evening.     valGANciclovir 450 mg Tab  Commonly known as: VALCYTE  Take 1 tablet (450 mg total) by mouth once daily. STOP 7/7/22        STOP taking these medications    nystatin 100,000 unit/mL suspension  Commonly known as: MYCOSTATIN          Time spent caring for patient (Greater than 1/2 spent in direct face-to-face contact): > 30 minutes    GENE Hou  Liver Transplant  [facility] - Intensive Care ([place] [address])  "

## 2022-02-07 ENCOUNTER — TELEPHONE (OUTPATIENT)
Dept: TRANSPLANT | Facility: HOSPITAL | Age: 67
End: 2022-02-07
Payer: MEDICARE

## 2022-02-07 ENCOUNTER — PATIENT OUTREACH (OUTPATIENT)
Dept: ADMINISTRATIVE | Facility: CLINIC | Age: 67
End: 2022-02-07
Payer: MEDICARE

## 2022-02-07 ENCOUNTER — TELEPHONE (OUTPATIENT)
Dept: HEMATOLOGY/ONCOLOGY | Facility: CLINIC | Age: 67
End: 2022-02-07
Payer: MEDICARE

## 2022-02-07 LAB
BACTERIA FLD AEROBE CULT: NO GROWTH
G6PD RBC-CCNT: 13.3 U/G HB (ref 8–11.9)
GRAM STN SPEC: NORMAL
GRAM STN SPEC: NORMAL

## 2022-02-07 NOTE — TELEPHONE ENCOUNTER
ALEXSANDRA Unexpected Weekend Discharge Note    SW called the patient and spoke with him over the phone. Pt unexpectedly discharged over the weekend and returned to his apartment in [place], [place] under the care of his wife. Pt's wife transported the pt home. Pt states that he has been feeling better since he returned home. Pt states he is glad to be back in his bed. SW informed the pt that PT had recommended the pt receive home health PT in the home while he was admitted. Pt denies the need for any home health services at this time and states he is ambulating on his own. Pt states he is coping well and has no current needs. Pt states his wife is taking good care of him in the home. Pt's wife denies any concerns or needs. SW providing continued psychosocial and counseling support, education, resources, assistance, and discharge planning as indicated. SW remains available.

## 2022-02-08 ENCOUNTER — LAB VISIT (OUTPATIENT)
Dept: LAB | Facility: HOSPITAL | Age: 67
End: 2022-02-08
Attending: SURGERY
Payer: MEDICARE

## 2022-02-08 DIAGNOSIS — E83.42 HYPOMAGNESEMIA: ICD-10-CM

## 2022-02-08 DIAGNOSIS — Z94.4 LIVER REPLACED BY TRANSPLANT: ICD-10-CM

## 2022-02-08 LAB
ALBUMIN SERPL BCP-MCNC: 3 G/DL (ref 3.5–5.2)
ALP SERPL-CCNC: 60 U/L (ref 55–135)
ALT SERPL W/O P-5'-P-CCNC: 11 U/L (ref 10–44)
ANION GAP SERPL CALC-SCNC: 4 MMOL/L (ref 8–16)
AST SERPL-CCNC: 14 U/L (ref 10–40)
BASOPHILS # BLD AUTO: 0.05 K/UL (ref 0–0.2)
BASOPHILS NFR BLD: 1.1 % (ref 0–1.9)
BILIRUB DIRECT SERPL-MCNC: 0.3 MG/DL (ref 0.1–0.3)
BILIRUB SERPL-MCNC: 0.6 MG/DL (ref 0.1–1)
BUN SERPL-MCNC: 14 MG/DL (ref 8–23)
CALCIUM SERPL-MCNC: 8.7 MG/DL (ref 8.7–10.5)
CHLORIDE SERPL-SCNC: 104 MMOL/L (ref 95–110)
CO2 SERPL-SCNC: 29 MMOL/L (ref 23–29)
CREAT SERPL-MCNC: 0.8 MG/DL (ref 0.5–1.4)
DIFFERENTIAL METHOD: ABNORMAL
EOSINOPHIL # BLD AUTO: 0 K/UL (ref 0–0.5)
EOSINOPHIL NFR BLD: 0.9 % (ref 0–8)
ERYTHROCYTE [DISTWIDTH] IN BLOOD BY AUTOMATED COUNT: 15.1 % (ref 11.5–14.5)
EST. GFR  (AFRICAN AMERICAN): >60 ML/MIN/1.73 M^2
EST. GFR  (NON AFRICAN AMERICAN): >60 ML/MIN/1.73 M^2
GLUCOSE SERPL-MCNC: 171 MG/DL (ref 70–110)
HCT VFR BLD AUTO: 28.9 % (ref 40–54)
HGB BLD-MCNC: 8.6 G/DL (ref 14–18)
IMM GRANULOCYTES # BLD AUTO: 0.06 K/UL (ref 0–0.04)
IMM GRANULOCYTES NFR BLD AUTO: 1.3 % (ref 0–0.5)
LYMPHOCYTES # BLD AUTO: 0.7 K/UL (ref 1–4.8)
LYMPHOCYTES NFR BLD: 14 % (ref 18–48)
MAGNESIUM SERPL-MCNC: 1.3 MG/DL (ref 1.6–2.6)
MCH RBC QN AUTO: 28.4 PG (ref 27–31)
MCHC RBC AUTO-ENTMCNC: 29.8 G/DL (ref 32–36)
MCV RBC AUTO: 95 FL (ref 82–98)
MONOCYTES # BLD AUTO: 0.5 K/UL (ref 0.3–1)
MONOCYTES NFR BLD: 9.7 % (ref 4–15)
NEUTROPHILS # BLD AUTO: 3.4 K/UL (ref 1.8–7.7)
NEUTROPHILS NFR BLD: 73 % (ref 38–73)
NRBC BLD-RTO: 0 /100 WBC
PLATELET # BLD AUTO: 189 K/UL (ref 150–450)
PMV BLD AUTO: 10.6 FL (ref 9.2–12.9)
POTASSIUM SERPL-SCNC: 4.5 MMOL/L (ref 3.5–5.1)
PROT SERPL-MCNC: 5.7 G/DL (ref 6–8.4)
RBC # BLD AUTO: 3.03 M/UL (ref 4.6–6.2)
SODIUM SERPL-SCNC: 137 MMOL/L (ref 136–145)
TACROLIMUS BLD-MCNC: 8.2 NG/ML (ref 5–15)
WBC # BLD AUTO: 4.65 K/UL (ref 3.9–12.7)

## 2022-02-08 PROCEDURE — 80197 ASSAY OF TACROLIMUS: CPT | Performed by: SURGERY

## 2022-02-08 PROCEDURE — 82248 BILIRUBIN DIRECT: CPT | Performed by: SURGERY

## 2022-02-08 PROCEDURE — 83735 ASSAY OF MAGNESIUM: CPT | Performed by: SURGERY

## 2022-02-08 PROCEDURE — 80053 COMPREHEN METABOLIC PANEL: CPT | Performed by: SURGERY

## 2022-02-08 PROCEDURE — 85025 COMPLETE CBC W/AUTO DIFF WBC: CPT | Performed by: SURGERY

## 2022-02-08 PROCEDURE — 36415 COLL VENOUS BLD VENIPUNCTURE: CPT | Mod: PN | Performed by: SURGERY

## 2022-02-09 ENCOUNTER — PATIENT MESSAGE (OUTPATIENT)
Dept: ADMINISTRATIVE | Facility: OTHER | Age: 67
End: 2022-02-09
Payer: MEDICARE

## 2022-02-09 ENCOUNTER — TELEPHONE (OUTPATIENT)
Dept: TRANSPLANT | Facility: CLINIC | Age: 67
End: 2022-02-09
Payer: MEDICARE

## 2022-02-09 ENCOUNTER — PATIENT MESSAGE (OUTPATIENT)
Dept: ADMINISTRATIVE | Facility: CLINIC | Age: 67
End: 2022-02-09
Payer: MEDICARE

## 2022-02-09 NOTE — TELEPHONE ENCOUNTER
Reviewed Tacrolimus level with Dr Zamarripa, no changes.  Patent will have labs again on Monday 02/14/22m called to let him know.

## 2022-02-10 ENCOUNTER — PATIENT MESSAGE (OUTPATIENT)
Dept: ADMINISTRATIVE | Facility: OTHER | Age: 67
End: 2022-02-10
Payer: MEDICARE

## 2022-02-10 ENCOUNTER — PATIENT MESSAGE (OUTPATIENT)
Dept: ADMINISTRATIVE | Facility: CLINIC | Age: 67
End: 2022-02-10
Payer: MEDICARE

## 2022-02-10 ENCOUNTER — HOSPITAL ENCOUNTER (OUTPATIENT)
Dept: RADIOLOGY | Facility: HOSPITAL | Age: 67
Discharge: HOME | End: 2022-02-10
Attending: SURGERY
Payer: MEDICARE

## 2022-02-10 DIAGNOSIS — Z94.4 LIVER REPLACED BY TRANSPLANT: ICD-10-CM

## 2022-02-10 LAB — BACTERIA SPEC ANAEROBE CULT: NORMAL

## 2022-02-10 PROCEDURE — 93976 US DOPPLER LIVER TRANSPLANT POST (XPD): ICD-10-PCS | Mod: 26,,, | Performed by: INTERNAL MEDICINE

## 2022-02-10 PROCEDURE — 76705 US DOPPLER LIVER TRANSPLANT POST (XPD): ICD-10-PCS | Mod: 26,XS,, | Performed by: INTERNAL MEDICINE

## 2022-02-10 PROCEDURE — 76705 ECHO EXAM OF ABDOMEN: CPT | Mod: 26,XS,, | Performed by: INTERNAL MEDICINE

## 2022-02-10 PROCEDURE — 76705 ECHO EXAM OF ABDOMEN: CPT | Mod: 59,TC

## 2022-02-10 PROCEDURE — 93976 VASCULAR STUDY: CPT | Mod: 26,,, | Performed by: INTERNAL MEDICINE

## 2022-02-10 PROCEDURE — 93976 VASCULAR STUDY: CPT | Mod: TC

## 2022-02-11 ENCOUNTER — PATIENT MESSAGE (OUTPATIENT)
Dept: ADMINISTRATIVE | Facility: CLINIC | Age: 67
End: 2022-02-11
Payer: MEDICARE

## 2022-02-11 ENCOUNTER — PATIENT MESSAGE (OUTPATIENT)
Dept: TRANSPLANT | Facility: CLINIC | Age: 67
End: 2022-02-11
Payer: MEDICARE

## 2022-02-11 ENCOUNTER — TELEPHONE (OUTPATIENT)
Dept: HEMATOLOGY/ONCOLOGY | Facility: CLINIC | Age: 67
End: 2022-02-11
Payer: MEDICARE

## 2022-02-11 ENCOUNTER — TELEPHONE (OUTPATIENT)
Dept: TRANSPLANT | Facility: CLINIC | Age: 67
End: 2022-02-11
Payer: MEDICARE

## 2022-02-11 ENCOUNTER — PATIENT MESSAGE (OUTPATIENT)
Dept: ADMINISTRATIVE | Facility: OTHER | Age: 67
End: 2022-02-11
Payer: MEDICARE

## 2022-02-12 ENCOUNTER — PATIENT MESSAGE (OUTPATIENT)
Dept: ADMINISTRATIVE | Facility: OTHER | Age: 67
End: 2022-02-12
Payer: MEDICARE

## 2022-02-12 ENCOUNTER — PATIENT MESSAGE (OUTPATIENT)
Dept: ADMINISTRATIVE | Facility: CLINIC | Age: 67
End: 2022-02-12
Payer: MEDICARE

## 2022-02-13 ENCOUNTER — NURSE TRIAGE (OUTPATIENT)
Dept: ADMINISTRATIVE | Facility: CLINIC | Age: 67
End: 2022-02-13
Payer: MEDICARE

## 2022-02-13 ENCOUNTER — PATIENT MESSAGE (OUTPATIENT)
Dept: ADMINISTRATIVE | Facility: OTHER | Age: 67
End: 2022-02-13
Payer: MEDICARE

## 2022-02-13 ENCOUNTER — PATIENT MESSAGE (OUTPATIENT)
Dept: ADMINISTRATIVE | Facility: CLINIC | Age: 67
End: 2022-02-13
Payer: MEDICARE

## 2022-02-13 NOTE — TELEPHONE ENCOUNTER
Pt escalated for SpO2 of 93. Pt had just woken up from a nap. Recheck of SpO2 is 97. Pt denies any SOB at rest or worsening with activity. States mostly has fatigue with activity. Advised to make sure to stay hydrated, eat healthy meals, and slowly increase activity to regain strength. Pt has number to OOC for further concerns. Will continue to monitor.    Reason for Disposition   [1] Follow-up call to recent contact AND [2] information only call, no triage required    Protocols used: INFORMATION ONLY CALL - NO TRIAGE-A-

## 2022-02-14 ENCOUNTER — PATIENT MESSAGE (OUTPATIENT)
Dept: ADMINISTRATIVE | Facility: OTHER | Age: 67
End: 2022-02-14
Payer: MEDICARE

## 2022-02-14 ENCOUNTER — PATIENT MESSAGE (OUTPATIENT)
Dept: ADMINISTRATIVE | Facility: CLINIC | Age: 67
End: 2022-02-14
Payer: MEDICARE

## 2022-02-14 ENCOUNTER — PATIENT MESSAGE (OUTPATIENT)
Dept: TRANSPLANT | Facility: CLINIC | Age: 67
End: 2022-02-14
Payer: MEDICARE

## 2022-02-14 ENCOUNTER — LAB VISIT (OUTPATIENT)
Dept: LAB | Facility: HOSPITAL | Age: 67
End: 2022-02-14
Attending: SURGERY
Payer: MEDICARE

## 2022-02-14 DIAGNOSIS — E83.42 HYPOMAGNESEMIA: ICD-10-CM

## 2022-02-14 DIAGNOSIS — Z94.4 LIVER REPLACED BY TRANSPLANT: ICD-10-CM

## 2022-02-14 LAB
ALBUMIN SERPL BCP-MCNC: 3.2 G/DL (ref 3.5–5.2)
ALP SERPL-CCNC: 75 U/L (ref 55–135)
ALT SERPL W/O P-5'-P-CCNC: 21 U/L (ref 10–44)
ANION GAP SERPL CALC-SCNC: 8 MMOL/L (ref 8–16)
AST SERPL-CCNC: 19 U/L (ref 10–40)
BASOPHILS # BLD AUTO: 0.04 K/UL (ref 0–0.2)
BASOPHILS NFR BLD: 0.8 % (ref 0–1.9)
BILIRUB DIRECT SERPL-MCNC: 0.2 MG/DL (ref 0.1–0.3)
BILIRUB SERPL-MCNC: 0.5 MG/DL (ref 0.1–1)
BUN SERPL-MCNC: 16 MG/DL (ref 8–23)
CALCIUM SERPL-MCNC: 9.2 MG/DL (ref 8.7–10.5)
CHLORIDE SERPL-SCNC: 101 MMOL/L (ref 95–110)
CO2 SERPL-SCNC: 31 MMOL/L (ref 23–29)
CREAT SERPL-MCNC: 0.9 MG/DL (ref 0.5–1.4)
DIFFERENTIAL METHOD: ABNORMAL
EOSINOPHIL # BLD AUTO: 0 K/UL (ref 0–0.5)
EOSINOPHIL NFR BLD: 0.6 % (ref 0–8)
ERYTHROCYTE [DISTWIDTH] IN BLOOD BY AUTOMATED COUNT: 14.7 % (ref 11.5–14.5)
EST. GFR  (AFRICAN AMERICAN): >60 ML/MIN/1.73 M^2
EST. GFR  (NON AFRICAN AMERICAN): >60 ML/MIN/1.73 M^2
GLUCOSE SERPL-MCNC: 97 MG/DL (ref 70–110)
HCT VFR BLD AUTO: 32 % (ref 40–54)
HGB BLD-MCNC: 9.4 G/DL (ref 14–18)
IMM GRANULOCYTES # BLD AUTO: 0.05 K/UL (ref 0–0.04)
IMM GRANULOCYTES NFR BLD AUTO: 1 % (ref 0–0.5)
LYMPHOCYTES # BLD AUTO: 0.7 K/UL (ref 1–4.8)
LYMPHOCYTES NFR BLD: 15 % (ref 18–48)
MAGNESIUM SERPL-MCNC: 1.3 MG/DL (ref 1.6–2.6)
MCH RBC QN AUTO: 27.6 PG (ref 27–31)
MCHC RBC AUTO-ENTMCNC: 29.4 G/DL (ref 32–36)
MCV RBC AUTO: 94 FL (ref 82–98)
MONOCYTES # BLD AUTO: 0.5 K/UL (ref 0.3–1)
MONOCYTES NFR BLD: 10.2 % (ref 4–15)
NEUTROPHILS # BLD AUTO: 3.6 K/UL (ref 1.8–7.7)
NEUTROPHILS NFR BLD: 72.4 % (ref 38–73)
NRBC BLD-RTO: 0 /100 WBC
PLATELET # BLD AUTO: 166 K/UL (ref 150–450)
PMV BLD AUTO: 10.7 FL (ref 9.2–12.9)
POTASSIUM SERPL-SCNC: 4.7 MMOL/L (ref 3.5–5.1)
PROT SERPL-MCNC: 6 G/DL (ref 6–8.4)
RBC # BLD AUTO: 3.4 M/UL (ref 4.6–6.2)
SODIUM SERPL-SCNC: 140 MMOL/L (ref 136–145)
TACROLIMUS BLD-MCNC: 12.8 NG/ML (ref 5–15)
WBC # BLD AUTO: 4.92 K/UL (ref 3.9–12.7)

## 2022-02-14 PROCEDURE — 80197 ASSAY OF TACROLIMUS: CPT | Performed by: SURGERY

## 2022-02-14 PROCEDURE — 87517 HEPATITIS B DNA QUANT: CPT | Performed by: SURGERY

## 2022-02-14 PROCEDURE — 87536 HIV-1 QUANT&REVRSE TRNSCRPJ: CPT | Performed by: SURGERY

## 2022-02-14 PROCEDURE — 85025 COMPLETE CBC W/AUTO DIFF WBC: CPT | Performed by: SURGERY

## 2022-02-14 PROCEDURE — 82248 BILIRUBIN DIRECT: CPT | Performed by: SURGERY

## 2022-02-14 PROCEDURE — 83735 ASSAY OF MAGNESIUM: CPT | Performed by: SURGERY

## 2022-02-14 PROCEDURE — 80053 COMPREHEN METABOLIC PANEL: CPT | Performed by: SURGERY

## 2022-02-14 PROCEDURE — 36415 COLL VENOUS BLD VENIPUNCTURE: CPT | Mod: PN | Performed by: SURGERY

## 2022-02-14 PROCEDURE — 87522 HEPATITIS C REVRS TRNSCRPJ: CPT | Performed by: SURGERY

## 2022-02-14 NOTE — TELEPHONE ENCOUNTER
Sent message to let patient know labs are stable, Tacrolimus level too high.  Please decrease Tacrolimus dose to 1 mg two times daily.  Labs again on Thursday.    ----- Message from Werner Zamarripa MD sent at 2/14/2022  3:11 PM CST -----  Results ok. No action needed

## 2022-02-15 ENCOUNTER — PATIENT MESSAGE (OUTPATIENT)
Dept: TRANSPLANT | Facility: CLINIC | Age: 67
End: 2022-02-15

## 2022-02-15 ENCOUNTER — OFFICE VISIT (OUTPATIENT)
Dept: TRANSPLANT | Facility: CLINIC | Age: 67
End: 2022-02-15
Payer: MEDICARE

## 2022-02-15 ENCOUNTER — PATIENT MESSAGE (OUTPATIENT)
Dept: ADMINISTRATIVE | Facility: OTHER | Age: 67
End: 2022-02-15
Payer: MEDICARE

## 2022-02-15 ENCOUNTER — NURSE TRIAGE (OUTPATIENT)
Dept: ADMINISTRATIVE | Facility: CLINIC | Age: 67
End: 2022-02-15
Payer: MEDICARE

## 2022-02-15 ENCOUNTER — PATIENT MESSAGE (OUTPATIENT)
Dept: ENDOCRINOLOGY | Facility: CLINIC | Age: 67
End: 2022-02-15
Payer: MEDICARE

## 2022-02-15 VITALS
WEIGHT: 226.19 LBS | SYSTOLIC BLOOD PRESSURE: 107 MMHG | TEMPERATURE: 97 F | HEART RATE: 86 BPM | BODY MASS INDEX: 30.64 KG/M2 | OXYGEN SATURATION: 92 % | RESPIRATION RATE: 16 BRPM | DIASTOLIC BLOOD PRESSURE: 55 MMHG | HEIGHT: 72 IN

## 2022-02-15 DIAGNOSIS — Z94.4 S/P LIVER TRANSPLANT: Primary | ICD-10-CM

## 2022-02-15 DIAGNOSIS — Z79.60 LONG-TERM USE OF IMMUNOSUPPRESSANT MEDICATION: ICD-10-CM

## 2022-02-15 DIAGNOSIS — E11.9 TYPE 2 DIABETES MELLITUS WITHOUT COMPLICATION, WITH LONG-TERM CURRENT USE OF INSULIN: ICD-10-CM

## 2022-02-15 DIAGNOSIS — Z51.81 ENCOUNTER FOR THERAPEUTIC DRUG MONITORING: ICD-10-CM

## 2022-02-15 DIAGNOSIS — Z79.4 TYPE 2 DIABETES MELLITUS WITHOUT COMPLICATION, WITH LONG-TERM CURRENT USE OF INSULIN: ICD-10-CM

## 2022-02-15 PROCEDURE — 1159F PR MEDICATION LIST DOCUMENTED IN MEDICAL RECORD: ICD-10-PCS | Mod: CPTII,S$GLB,, | Performed by: TRANSPLANT SURGERY

## 2022-02-15 PROCEDURE — 1111F PR DISCHARGE MEDS RECONCILED W/ CURRENT OUTPATIENT MED LIST: ICD-10-PCS | Mod: CPTII,S$GLB,, | Performed by: TRANSPLANT SURGERY

## 2022-02-15 PROCEDURE — 3008F BODY MASS INDEX DOCD: CPT | Mod: CPTII,S$GLB,, | Performed by: TRANSPLANT SURGERY

## 2022-02-15 PROCEDURE — 99999 PR PBB SHADOW E&M-EST. PATIENT-LVL IV: CPT | Mod: PBBFAC,,,

## 2022-02-15 PROCEDURE — 3044F PR MOST RECENT HEMOGLOBIN A1C LEVEL <7.0%: ICD-10-PCS | Mod: CPTII,S$GLB,, | Performed by: TRANSPLANT SURGERY

## 2022-02-15 PROCEDURE — 1101F PR PT FALLS ASSESS DOC 0-1 FALLS W/OUT INJ PAST YR: ICD-10-PCS | Mod: CPTII,S$GLB,, | Performed by: TRANSPLANT SURGERY

## 2022-02-15 PROCEDURE — 1125F AMNT PAIN NOTED PAIN PRSNT: CPT | Mod: CPTII,S$GLB,, | Performed by: TRANSPLANT SURGERY

## 2022-02-15 PROCEDURE — 3008F PR BODY MASS INDEX (BMI) DOCUMENTED: ICD-10-PCS | Mod: CPTII,S$GLB,, | Performed by: TRANSPLANT SURGERY

## 2022-02-15 PROCEDURE — 99215 OFFICE O/P EST HI 40 MIN: CPT | Mod: 24,S$GLB,, | Performed by: TRANSPLANT SURGERY

## 2022-02-15 PROCEDURE — 1125F PR PAIN SEVERITY QUANTIFIED, PAIN PRESENT: ICD-10-PCS | Mod: CPTII,S$GLB,, | Performed by: TRANSPLANT SURGERY

## 2022-02-15 PROCEDURE — 3044F HG A1C LEVEL LT 7.0%: CPT | Mod: CPTII,S$GLB,, | Performed by: TRANSPLANT SURGERY

## 2022-02-15 PROCEDURE — 3074F SYST BP LT 130 MM HG: CPT | Mod: CPTII,S$GLB,, | Performed by: TRANSPLANT SURGERY

## 2022-02-15 PROCEDURE — 1159F MED LIST DOCD IN RCRD: CPT | Mod: CPTII,S$GLB,, | Performed by: TRANSPLANT SURGERY

## 2022-02-15 PROCEDURE — 99999 PR PBB SHADOW E&M-EST. PATIENT-LVL IV: ICD-10-PCS | Mod: PBBFAC,,,

## 2022-02-15 PROCEDURE — 99215 PR OFFICE/OUTPT VISIT, EST, LEVL V, 40-54 MIN: ICD-10-PCS | Mod: 24,S$GLB,, | Performed by: TRANSPLANT SURGERY

## 2022-02-15 PROCEDURE — 4010F ACE/ARB THERAPY RXD/TAKEN: CPT | Mod: CPTII,S$GLB,, | Performed by: TRANSPLANT SURGERY

## 2022-02-15 PROCEDURE — 3288F PR FALLS RISK ASSESSMENT DOCUMENTED: ICD-10-PCS | Mod: CPTII,S$GLB,, | Performed by: TRANSPLANT SURGERY

## 2022-02-15 PROCEDURE — 3074F PR MOST RECENT SYSTOLIC BLOOD PRESSURE < 130 MM HG: ICD-10-PCS | Mod: CPTII,S$GLB,, | Performed by: TRANSPLANT SURGERY

## 2022-02-15 PROCEDURE — 1111F DSCHRG MED/CURRENT MED MERGE: CPT | Mod: CPTII,S$GLB,, | Performed by: TRANSPLANT SURGERY

## 2022-02-15 PROCEDURE — 4010F PR ACE/ARB THEARPY RXD/TAKEN: ICD-10-PCS | Mod: CPTII,S$GLB,, | Performed by: TRANSPLANT SURGERY

## 2022-02-15 PROCEDURE — 1101F PT FALLS ASSESS-DOCD LE1/YR: CPT | Mod: CPTII,S$GLB,, | Performed by: TRANSPLANT SURGERY

## 2022-02-15 PROCEDURE — 3288F FALL RISK ASSESSMENT DOCD: CPT | Mod: CPTII,S$GLB,, | Performed by: TRANSPLANT SURGERY

## 2022-02-15 PROCEDURE — 3078F DIAST BP <80 MM HG: CPT | Mod: CPTII,S$GLB,, | Performed by: TRANSPLANT SURGERY

## 2022-02-15 PROCEDURE — 3078F PR MOST RECENT DIASTOLIC BLOOD PRESSURE < 80 MM HG: ICD-10-PCS | Mod: CPTII,S$GLB,, | Performed by: TRANSPLANT SURGERY

## 2022-02-15 RX ORDER — TACROLIMUS 1 MG/1
1 CAPSULE ORAL EVERY 12 HOURS
Qty: 60 CAPSULE | Refills: 11 | Status: SHIPPED | OUTPATIENT
Start: 2022-02-15 | End: 2022-03-16

## 2022-02-15 RX ORDER — LANCETS
EACH MISCELLANEOUS
Qty: 100 EACH | Refills: 11 | Status: SHIPPED | OUTPATIENT
Start: 2022-02-15 | End: 2022-02-16 | Stop reason: SDUPTHER

## 2022-02-15 NOTE — PROGRESS NOTES
Transplant Surgery  Liver Transplant Recipient Follow-up    Original Referring Physician: Jessika Carbone  Current Corresponding Physician: Jessika Carbone    Chief Complaint: Tej is here for follow up of his liver transplant performed 1/6/2022 for the primary diagnosis (UNOS) of Primary Liver Malignancy: Hepatoma (HCC) and Cirrhosis    ORGAN: LIVER  Whole or Partial: whole liver  Donor Type: donation after brain death  PHS Increased Risk: no  Donor CMV Status: Positive  Donor HCV Status: Negative  Donor HBcAb: Negative  Donor HBV JAYLIN: Negative  Donor HCV JAYLIN: Negative    Biliary Anastomosis: end to end  Arterial Anatomy: replaced left hepatic from left gastric  IVC reconstruction: end to end ivc  Portal vein status: patent    Subjective:     History of Present Illness: He has had the following complications since transplant: ascites, deconditioning, covid admission.  The noted complications are well controlled.    Interval History: Currently, he is doing with difficulty.  Current complaints include fatigue, abdominal distention and tremor.  Tej is here for management of his immunosuppression medication.    External provider notes reviewed: Yes    Review of Systems  Objective:     Physical Exam  Constitutional:       Appearance: He is well-developed and well-nourished.   HENT:      Head: Normocephalic and atraumatic.   Eyes:      Extraocular Movements: EOM normal.      Pupils: Pupils are equal, round, and reactive to light.   Neck:      Vascular: No JVD.   Cardiovascular:      Rate and Rhythm: Normal rate and regular rhythm.      Heart sounds: Normal heart sounds.   Pulmonary:      Effort: Pulmonary effort is normal.      Breath sounds: Normal breath sounds. No stridor.   Abdominal:      General: There is no distension.      Palpations: Abdomen is soft.      Tenderness: There is no abdominal tenderness.      Comments: Mild distention; incision well healed   Musculoskeletal:         General: No edema. Normal  range of motion.   Skin:     General: Skin is warm and dry.   Neurological:      Mental Status: He is alert and oriented to person, place, and time.      Comments: Moderate tremor   Psychiatric:         Mood and Affect: Mood and affect normal.         Behavior: Behavior normal.       Lab Results   Component Value Date    BILITOT 0.5 02/14/2022    AST 19 02/14/2022    ALT 21 02/14/2022    ALKPHOS 75 02/14/2022    CREATININE 0.9 02/14/2022    ALBUMIN 3.2 (L) 02/14/2022     Lab Results   Component Value Date    WBC 4.92 02/14/2022    HGB 9.4 (L) 02/14/2022    HCT 32.0 (L) 02/14/2022    HCT 33 (L) 01/06/2022     02/14/2022     Lab Results   Component Value Date    TACROLIMUS 12.8 02/14/2022       Diagnostics:  The following labs have been reviewed: CBC  CMP  The following radiology images have been independently reviewed and interpreted: Liver US    Assessment/Plan:          · S/P liver transplant.  · covid recovery - fatigue contnues, needs PT  · Chronic immunosuppressive medications for rejection prophylaxis supratherapeutic.  Plan: decreased yesterday.  · Continue monitoring symptoms, labs and drug levels for drug-related toxicity and side effects.  · Incision: staples out, wound well-healed, no evidence of hernia  · Femoral arterial line site: no complications evident    Additional testing to be completed according to Written Order Guidelines for Post-Liver and Combined Liver/Kidney Transplant Follow-up (LI-09)    Interpretation of tests and discussion of patient management involves all members of the multidisciplinary transplant team  Patient advised that it is recommended that all transplanted patients, and their close contacts and household members receive Covid vaccination.  Rolando Antunez Jr, MD       [facility] Patient Status  Functional Status: 60% - Requires occasional assistance but is able to care for needs  Physical Capacity: Wheelchair bound or more limited

## 2022-02-15 NOTE — TELEPHONE ENCOUNTER
Pt escalated for SOB w/activity. Spoke to pt who is currently at appt with transplant. No triage done at this time. Pt has advised them of his symptoms. Pt has number to OOC for further concerns. Will continue to monitor.    Reason for Disposition   Patient already left for the hospital/clinic    Protocols used: NO CONTACT OR DUPLICATE CONTACT CALL-A-[place]

## 2022-02-15 NOTE — TELEPHONE ENCOUNTER
Escalation done per 3 for SOB with activity. Speaking with patient, light headed also,feels he is the same but now understands how to score in a more accurate way. No audible S/S distress noted. 96%, pulse 77, temp 98.8, NO to fever, NO to worsening S/S, 0 SOB with rest and 3 SOB with activity. Offered MOY call, patient states he has appointment with his transplant provider today and he will address all his concerns. Advised I would send message to provider and coordinator. Verb understanding. Instructed to call back for any further questions or concerns or worsening S/S.

## 2022-02-16 ENCOUNTER — PATIENT MESSAGE (OUTPATIENT)
Dept: ADMINISTRATIVE | Facility: OTHER | Age: 67
End: 2022-02-16
Payer: MEDICARE

## 2022-02-16 ENCOUNTER — NURSE TRIAGE (OUTPATIENT)
Dept: ADMINISTRATIVE | Facility: CLINIC | Age: 67
End: 2022-02-16
Payer: MEDICARE

## 2022-02-16 ENCOUNTER — TELEPHONE (OUTPATIENT)
Dept: TRANSPLANT | Facility: CLINIC | Age: 67
End: 2022-02-16
Payer: MEDICARE

## 2022-02-16 LAB
HEPATITIS C VIRUS (HCV) RNA DETECTION/QUANTIFICATION RT-PCR: NORMAL IU/ML
HIV1 RNA # PLAS NAA DL=20: NORMAL COPIES/ML

## 2022-02-16 NOTE — TELEPHONE ENCOUNTER
ALEXSANDRA attempted to contact pt and pt's wife. No answer, unable to leave VM.   ALEXSANDRA faxed home health orders and demographics information to [facility] ([phone]). ALEXSANDRA called Hilary ([facility] ), no answer left a VM requesting a call back. [facility] will need to provide authorization and then they will make home health placement.     ALEXSANDRA remains available.       ----- Message from Aniya Amaya RN sent at 2/15/2022  3:47 PM CST -----  Patient was seen in clinic today.  Put in order for PT/OT to evaluate and treat.

## 2022-02-16 NOTE — TELEPHONE ENCOUNTER
Pt called and he said that he is about the same and no change SOB rated 0/3 which escalated him and pt will reach out if starts running a temp or SOB increased or O2 sat  <,94  Reason for Disposition   Shock suspected (e.g., cold/pale/clammy skin, too weak to stand, low BP, rapid pulse)   Pulse oximetry, questions about    Additional Information   Negative: Passed out (i.e., lost consciousness, collapsed and was not responding)   Negative: SEVERE difficulty breathing (e.g., struggling for each breath, speaks in single words, pulse > 120)   Negative: Difficult to awaken or acting confused (e.g., disoriented, slurred speech)   Negative: Bluish (or gray) lips or face now   Negative: Slow, shallow and weak breathing   Negative: Sounds like a life-threatening emergency to the triager   Negative: [1] MODERATE difficulty breathing (e.g., speaks in phrases, SOB even at rest, pulse 100 - 120) AND [2] new-onset or worse than normal   Negative: [1] MODERATE difficulty breathing AND [2] oxygen level (e.g., pulse oximetry) 91 to 94 percent   Negative: Oxygen level (e.g., pulse oximetry) 90 percent or lower   Negative: Patient sounds very sick or weak to the triager   Negative: [1] MODERATE difficulty breathing (e.g., speaks in phrases, SOB even at rest, pulse 100 - 120) AND [2] NOT new-onset or worse than normal   Negative: [1] Drinking very little AND [2] dehydration suspected (e.g., no urine > 12 hours, very dry mouth, very lightheaded)   Negative: [1] MILD difficulty breathing (e.g., minimal/no SOB at rest, SOB with walking, pulse <100) AND [2] new-onset   Negative: Fever > 100.4 F (38.0 C)   Negative: Nurse judgment   Negative: [1] Fall in oxygen level 4% or more (below known patient baseline, while awake and resting) AND [2] new or worse difficulty breathing   Negative: Oxygen level (e.g., pulse oximetry) 91 to 94 percent   Negative: Pulse (heart rate) > 120 beats per minute   Negative: [1] Caller has  URGENT question AND [2] triager unable to answer question   Negative: [1] MILD difficulty breathing  (e.g., minimal/no SOB at rest, SOB with walking) AND [2] worse than normal   Negative: Fever present > 3 days (72 hours)   Negative: [1] Fever returns after gone for over 24 hours AND [2] symptoms worse or not improved   Negative: [1] Receiving oxygen therapy (e.g., nasal cannula, mask) AND [2] oxygen level 96% or higher AND [3] three times in 1 week   Negative: Pulse (heart rate) > 100 beats per minute   Negative: Coughing up yellow-green sputum   Negative: Coughing up renu-colored (reddish-brown) sputum   Negative: [1] Caller has NON-URGENT question AND [2] triager unable to answer question   Negative: [1] Fall in oxygen level 2% or more (below known patient baseline, awake while at rest) AND [2] three times in 1 week   Negative: [1] Rise in oxygen level 2% or more (above known patient baseline, awake while at rest) AND [2] three times in 1 week.    Protocols used: PELVIC PAIN - FEMALE-A-OH, OXYGEN MONITORING AND [id number]

## 2022-02-16 NOTE — TELEPHONE ENCOUNTER
Pt contacted for Surveillance escalation - SOB 3/5 with activity.. Denies any fever or increased SOB, pt able to speak in full sentences without noted SOB or cough. Denies any need for triage at this time reporting no new symptoms. Info only  protocol used and pt advised on home care. Pt instructed to call OOC for worsening of symptoms or health questions.    Reason for Disposition   Information only question and nurse able to answer    Protocols used: INFORMATION ONLY CALL - NO TRIAGE-A-OH

## 2022-02-17 ENCOUNTER — OFFICE VISIT (OUTPATIENT)
Dept: HEMATOLOGY/ONCOLOGY | Facility: CLINIC | Age: 67
End: 2022-02-17
Payer: MEDICARE

## 2022-02-17 ENCOUNTER — NURSE TRIAGE (OUTPATIENT)
Dept: ADMINISTRATIVE | Facility: CLINIC | Age: 67
End: 2022-02-17
Payer: MEDICARE

## 2022-02-17 ENCOUNTER — PATIENT MESSAGE (OUTPATIENT)
Dept: ADMINISTRATIVE | Facility: OTHER | Age: 67
End: 2022-02-17
Payer: MEDICARE

## 2022-02-17 VITALS
HEART RATE: 75 BPM | RESPIRATION RATE: 16 BRPM | DIASTOLIC BLOOD PRESSURE: 56 MMHG | SYSTOLIC BLOOD PRESSURE: 116 MMHG | OXYGEN SATURATION: 94 % | WEIGHT: 226.19 LBS | BODY MASS INDEX: 30.68 KG/M2

## 2022-02-17 DIAGNOSIS — Z94.4 S/P LIVER TRANSPLANT: ICD-10-CM

## 2022-02-17 DIAGNOSIS — Z79.899 IMMUNODEFICIENCY DUE TO DRUGS: ICD-10-CM

## 2022-02-17 DIAGNOSIS — D63.0 ANEMIA IN NEOPLASTIC DISEASE: ICD-10-CM

## 2022-02-17 DIAGNOSIS — C22.1 CHOLANGIOCARCINOMA: Primary | ICD-10-CM

## 2022-02-17 DIAGNOSIS — D84.821 IMMUNODEFICIENCY DUE TO DRUGS: ICD-10-CM

## 2022-02-17 DIAGNOSIS — E78.5 HYPERLIPIDEMIA, UNSPECIFIED HYPERLIPIDEMIA TYPE: ICD-10-CM

## 2022-02-17 DIAGNOSIS — I73.9 PAD (PERIPHERAL ARTERY DISEASE): ICD-10-CM

## 2022-02-17 DIAGNOSIS — I70.0 AORTIC ATHEROSCLEROSIS: ICD-10-CM

## 2022-02-17 DIAGNOSIS — E11.00 TYPE 2 DIABETES MELLITUS WITH HYPEROSMOLARITY WITHOUT COMA, UNSPECIFIED WHETHER LONG TERM INSULIN USE: ICD-10-CM

## 2022-02-17 LAB
HBV DNA SERPL NAA+PROBE-ACNC: <10 IU/ML
HBV DNA SERPL NAA+PROBE-LOG IU: <1 LOG (10) IU/ML
HBV DNA SERPL QL NAA+PROBE: NOT DETECTED

## 2022-02-17 PROCEDURE — 3074F SYST BP LT 130 MM HG: CPT | Mod: CPTII,S$GLB,, | Performed by: INTERNAL MEDICINE

## 2022-02-17 PROCEDURE — 3008F BODY MASS INDEX DOCD: CPT | Mod: CPTII,S$GLB,, | Performed by: INTERNAL MEDICINE

## 2022-02-17 PROCEDURE — 1111F PR DISCHARGE MEDS RECONCILED W/ CURRENT OUTPATIENT MED LIST: ICD-10-PCS | Mod: CPTII,S$GLB,, | Performed by: INTERNAL MEDICINE

## 2022-02-17 PROCEDURE — 3044F HG A1C LEVEL LT 7.0%: CPT | Mod: CPTII,S$GLB,, | Performed by: INTERNAL MEDICINE

## 2022-02-17 PROCEDURE — 3044F PR MOST RECENT HEMOGLOBIN A1C LEVEL <7.0%: ICD-10-PCS | Mod: CPTII,S$GLB,, | Performed by: INTERNAL MEDICINE

## 2022-02-17 PROCEDURE — 1111F DSCHRG MED/CURRENT MED MERGE: CPT | Mod: CPTII,S$GLB,, | Performed by: INTERNAL MEDICINE

## 2022-02-17 PROCEDURE — 4010F PR ACE/ARB THEARPY RXD/TAKEN: ICD-10-PCS | Mod: CPTII,S$GLB,, | Performed by: INTERNAL MEDICINE

## 2022-02-17 PROCEDURE — 1126F AMNT PAIN NOTED NONE PRSNT: CPT | Mod: CPTII,S$GLB,, | Performed by: INTERNAL MEDICINE

## 2022-02-17 PROCEDURE — 3008F PR BODY MASS INDEX (BMI) DOCUMENTED: ICD-10-PCS | Mod: CPTII,S$GLB,, | Performed by: INTERNAL MEDICINE

## 2022-02-17 PROCEDURE — 99999 PR PBB SHADOW E&M-EST. PATIENT-LVL III: CPT | Mod: PBBFAC,,, | Performed by: INTERNAL MEDICINE

## 2022-02-17 PROCEDURE — 3078F PR MOST RECENT DIASTOLIC BLOOD PRESSURE < 80 MM HG: ICD-10-PCS | Mod: CPTII,S$GLB,, | Performed by: INTERNAL MEDICINE

## 2022-02-17 PROCEDURE — 4010F ACE/ARB THERAPY RXD/TAKEN: CPT | Mod: CPTII,S$GLB,, | Performed by: INTERNAL MEDICINE

## 2022-02-17 PROCEDURE — 99999 PR PBB SHADOW E&M-EST. PATIENT-LVL III: ICD-10-PCS | Mod: PBBFAC,,, | Performed by: INTERNAL MEDICINE

## 2022-02-17 PROCEDURE — 3078F DIAST BP <80 MM HG: CPT | Mod: CPTII,S$GLB,, | Performed by: INTERNAL MEDICINE

## 2022-02-17 PROCEDURE — 3074F PR MOST RECENT SYSTOLIC BLOOD PRESSURE < 130 MM HG: ICD-10-PCS | Mod: CPTII,S$GLB,, | Performed by: INTERNAL MEDICINE

## 2022-02-17 PROCEDURE — 99204 PR OFFICE/OUTPT VISIT, NEW, LEVL IV, 45-59 MIN: ICD-10-PCS | Mod: S$GLB,,, | Performed by: INTERNAL MEDICINE

## 2022-02-17 PROCEDURE — 99204 OFFICE O/P NEW MOD 45 MIN: CPT | Mod: S$GLB,,, | Performed by: INTERNAL MEDICINE

## 2022-02-17 PROCEDURE — 1126F PR PAIN SEVERITY QUANTIFIED, NO PAIN PRESENT: ICD-10-PCS | Mod: CPTII,S$GLB,, | Performed by: INTERNAL MEDICINE

## 2022-02-17 NOTE — PROGRESS NOTES
MEDICAL ONCOLOGY - NEW PATIENT VISIT    Reason for visit: Intrahepatic cholangiocarcinoma    Best Contact Phone Number(s): [phone] (home)      Cancer/Stage/TNM:   Cancer Staging  No matching staging information was found for the patient.     Oncology History   HCC (hepatocellular carcinoma) (Resolved)   12/29/2020 Initial Diagnosis    HCC (hepatocellular carcinoma)          HPI:   66 y.o. male with CASTILLO cirrhosis s/p OLT 1/2022, HTN, a-fib, T2DM who presents for discussion of management after his liver explant showed cholangiocarcinoma on pathology.  He was initially noted to have a liver mass in late 2020 and was treated with TACE and RFA by IR in December 2020 - this was presumed to be HCC based on imaging characteristics.  He was followed thereafter without progression.  He was taken for liver transplantation on 1/6/22.  Since transplant he contracted Covid 19 infection.  Then he was in the hospital at the beginning of February for acute blood loss anemia with hgb of 5.5 with findings of bloody ascites that was drained.  No clear site of bleeding, improved with blood transfusions.  Now back on Eliquis, feels fatigued and weak still but not nearly as bad as when he was anemic with hgb < 6.  Needs assistance to walk more than two steps.  Denies pain, wound is well-healing.    History has been obtained by chart review and discussion with the patient.    ROS:   Review of Systems   Constitutional: Positive for malaise/fatigue and weight loss. Negative for chills and fever.   HENT: Negative for sore throat.    Eyes: Negative for blurred vision and pain.   Respiratory: Positive for shortness of breath. Negative for cough.    Cardiovascular: Negative for chest pain and leg swelling.   Gastrointestinal: Negative for abdominal pain, constipation, diarrhea, nausea and vomiting.   Genitourinary: Negative for dysuria and frequency.   Musculoskeletal: Negative for back pain, falls and myalgias.   Skin: Negative for rash.    Neurological: Positive for weakness. Negative for dizziness and headaches.   Endo/Heme/Allergies: Does not bruise/bleed easily.   Psychiatric/Behavioral: Negative for depression. The patient is not nervous/anxious.    All other systems reviewed and are negative.      Past Medical History:   Past Medical History:   Diagnosis Date    Atrial fibrillation     Cirrhosis 11/23/2020    Diabetes mellitus, type 2     Diabetic neuropathy 11/24/2020    Disorder of kidney and ureter     HTN (hypertension) 11/23/2020    Hyperlipidemia 11/23/2020    Kidney stones 11/23/2020    S/p lithotripsy 2020    Leukocytosis 1/15/2021    Liver mass 11/23/2020    CASTILLO (nonalcoholic steatohepatitis) 11/23/2020    PAD (peripheral artery disease)     Portal hypertension 11/23/2020    Skin cancer 11/23/2020        Past Surgical History:   Past Surgical History:   Procedure Laterality Date    COLONOSCOPY  10/2020    ESOPHAGOGASTRODUODENOSCOPY  10/2020    ESOPHAGOGASTRODUODENOSCOPY N/A 7/1/2021    Procedure: EGD (ESOPHAGOGASTRODUODENOSCOPY);  Surgeon: Jovan David MD;  Location: [facility] (4TH FLR);  Service: Endoscopy;  Laterality: N/A;  HCC. Listed for liver transplant. EGD for variceal surveillance.  cardiac clearance and blood thinenr approval received, see telephone encounter 6/23/21-BB  fully vaccinated-BB  labs same day of procedure-BB    EXCISION OF HYDROCELE Right     hemorroid surgery      hemorroidectomy      KIDNEY STONE SURGERY      LEFT HEART CATHETERIZATION N/A 1/27/2021    Procedure: Left heart cath;  Surgeon: Kris Shelton MD;  Location: [facility] CATH LAB;  Service: Cardiology;  Laterality: N/A;    liver mass removal      LIVER TRANSPLANT N/A 1/6/2022    Procedure: TRANSPLANT, LIVER;  Surgeon: Lizet Garcia MD;  Location: [facility] OR 2ND FLR;  Service: Transplant;  Laterality: N/A;    RIGHT HEART CATHETERIZATION Right 5/7/2021    Procedure: INSERTION, CATHETER, RIGHT HEART;  Surgeon: Jaden Schuster  MD;  Location: [facility] CATH LAB;  Service: Cardiology;  Laterality: Right;    TREATMENT OF CARDIAC ARRHYTHMIA N/A 2021    Procedure: CARDIOVERSION;  Surgeon: Didier Tilley MD;  Location: [facility] EP LAB;  Service: Cardiology;  Laterality: N/A;  a fib, dccv/johny, mac, dm. sscu    TREATMENT OF CARDIAC ARRHYTHMIA N/A 2021    Procedure: CARDIOVERSION;  Surgeon: Daniel Arambula MD;  Location: [facility] EP LAB;  Service: Cardiology;  Laterality: N/A;  afib, DCCV, anest., DM, 3 prep    TREATMENT OF CARDIAC ARRHYTHMIA N/A 2021    Procedure: Cardioversion or Defibrillation;  Surgeon: Didier Tilley MD;  Location: [facility] EP LAB;  Service: Cardiology;  Laterality: N/A;  AF, JOHNY (cancel if complaint), DCCV, MAC, DM, 3 Prep    TREATMENT OF CARDIAC ARRHYTHMIA N/A 2021    Procedure: Cardioversion or Defibrillation;  Surgeon: Didier Tilley MD;  Location: [facility] EP LAB;  Service: Cardiology;  Laterality: N/A;  AF, JOHNY (cx if complaint), DCCV, MAC, DM, 3 Prep        Family History:   No family history on file.     Social History:   Social History     Tobacco Use    Smoking status: Former Smoker     Quit date: 1980     Years since quittin.0    Smokeless tobacco: Never Used   Substance Use Topics    Alcohol use: Not Currently        I have reviewed and updated the patient's past medical, surgical, family and social histories.    Allergies:   Review of patient's allergies indicates:   Allergen Reactions    Bee pollens Swelling     BEE STINGS swells body        Medications:   Current Outpatient Medications   Medication Sig Dispense Refill    amiodarone (PACERONE) 200 MG Tab Take 1 tablet (200 mg total) by mouth once daily. 30 tablet 11    amLODIPine (NORVASC) 10 MG tablet Take 1 tablet (10 mg total) by mouth once daily. 30 tablet 11    apixaban (ELIQUIS) 5 mg Tab Take 1 tablet (5 mg total) by mouth 2 (two) times daily. 60 tablet 11    aspirin (ECOTRIN) 81 MG EC tablet Take 81 mg by mouth once daily.       "blood sugar diagnostic Strp To check BG 2 times daily, to use with insurance preferred meter (patient has a [employer] self-monitoring glucose meter) 100 strip 11    blood-glucose meter kit To check BG 2 times daily, to use with insurance preferred meter 1 each 0    calcium carbonate (TUMS) 200 mg calcium (500 mg) chewable tablet Take 1 tablet (500 mg total) by mouth 3 (three) times daily as needed for Heartburn. 90 tablet 5    calcium carbonate-vitamin D3 600 mg-20 mcg (800 unit) Tab Take 1 tablet by mouth once daily. 30 tablet 5    gabapentin (NEURONTIN) 300 MG capsule Take 1 capsule (300 mg total) by mouth 3 (three) times daily as needed (neuropathy). 90 capsule 5    insulin (LANTUS SOLOSTAR U-100 INSULIN) glargine 100 units/mL (3mL) SubQ pen Inject 24 Units into the skin every evening. 9 mL 11    insulin lispro (HUMALOG KWIKPEN INSULIN) 100 unit/mL pen Inject 6 Units into the skin 3 (three) times daily. Plus sliding scale, MDD: 48 units 15 mL 4    lancets Misc To check BG 2 times daily, to use with insurance preferred meter 100 each 11    lovastatin (MEVACOR) 20 MG tablet Take 20 mg by mouth once daily.      magnesium oxide (MAG-OX) 400 mg (241.3 mg magnesium) tablet Take 1 tablet (400 mg total) by mouth 2 (two) times daily. 60 tablet 5    multivitamin capsule Take 1 capsule by mouth once daily.      mycophenolate (CELLCEPT) 250 mg Cap Take 4 capsules (1,000 mg total) by mouth 2 (two) times daily. Resume on 2/11/22 240 capsule 2    omega-3 fatty acids/fish oil (FISH OIL-OMEGA-3 FATTY ACIDS) 300-1,000 mg capsule Take 1 capsule by mouth once daily.       oxyCODONE (ROXICODONE) 10 mg Tab immediate release tablet Take 1 tablet (10 mg total) by mouth every 4 (four) hours as needed for Pain. 30 tablet 0    pantoprazole (PROTONIX) 40 MG tablet Take 1 tablet (40 mg total) by mouth once daily. 30 tablet 5    pen needle, diabetic (BD ULTRA-FINE GÉNESIS PEN NEEDLE) 32 gauge x 5/32" Ndle Use with insulin once " daily 100 each 3    pulse oximeter (PULSE OXIMETER) device by Apply Externally route 2 (two) times a day. Use twice daily at 8 AM and 3 PM and [facility] as directed. 1 each 0    sertraline (ZOLOFT) 25 MG tablet Take 1 tablet (25 mg total) by mouth once daily. 30 tablet 5    sulfamethoxazole-trimethoprim 400-80mg (BACTRIM,SEPTRA) 400-80 mg per tablet Take 1 tablet by mouth every morning. STOP 7/7/22 30 tablet 5    tacrolimus (PROGRAF) 1 MG Cap Take 1 capsule (1 mg total) by mouth every 12 (twelve) hours. 60 capsule 11    tamsulosin (FLOMAX) 0.4 mg Cap Take 1 capsule (0.4 mg total) by mouth every evening. 30 capsule 1    valGANciclovir (VALCYTE) 450 mg Tab Take 1 tablet (450 mg total) by mouth once daily. STOP 7/7/22 30 tablet 5     No current facility-administered medications for this visit.     Facility-Administered Medications Ordered in Other Visits   Medication Dose Route Frequency Provider Last Rate Last Admin    sodium chloride 0.9% bolus 1,000 mL  1,000 mL Intravenous Once Solange Bill NP            Physical Exam:   BP (!) 116/56 (BP Location: Left arm, Patient Position: Sitting)   Pulse 75   Resp 16   Wt 102.6 kg (226 lb 3.1 oz)   SpO2 (!) 94%   BMI 30.68 kg/m²      ECOG Performance status: 3            Physical Exam  Vitals reviewed.   Constitutional:       General: He is not in acute distress.     Appearance: Normal appearance. He is obese.   HENT:      Head: Normocephalic and atraumatic.      Right Ear: External ear normal.      Left Ear: External ear normal.      Nose: Nose normal.      Mouth/Throat:      Mouth: Mucous membranes are moist.      Pharynx: Oropharynx is clear. No posterior oropharyngeal erythema.   Eyes:      General: No scleral icterus.     Extraocular Movements: Extraocular movements intact.      Conjunctiva/sclera: Conjunctivae normal.      Pupils: Pupils are equal, round, and reactive to light.   Cardiovascular:      Rate and Rhythm: Normal rate and  regular rhythm.      Pulses: Normal pulses.      Heart sounds: Normal heart sounds.   Pulmonary:      Effort: Pulmonary effort is normal.      Breath sounds: Normal breath sounds. No wheezing or rales.   Abdominal:      General: Bowel sounds are normal. There is no distension.      Palpations: Abdomen is soft.      Tenderness: There is no abdominal tenderness.      Comments: Horizontal abdominal surgical wound well-healing   Musculoskeletal:         General: No swelling. Normal range of motion.      Cervical back: Normal range of motion and neck supple.   Skin:     General: Skin is warm.      Coloration: Skin is not jaundiced.      Findings: No erythema or rash.   Neurological:      General: No focal deficit present.      Mental Status: He is alert and oriented to person, place, and time. Mental status is at baseline.      Gait: Gait normal.   Psychiatric:         Mood and Affect: Mood normal.         Behavior: Behavior normal.         Thought Content: Thought content normal.         Judgment: Judgment normal.           Labs:   No results found for this or any previous visit (from the past 48 hour(s)).     I have reviewed the pertinent labs from 2/14/22 which are notable for hgb 9.4, normal renal and hepatic function.    Imaging:       I have personally reviewed the 2/1 CT A/P imaging which is notable for ascites, no clear evidence of metastatic disease.    Path:   Component 1 mo ago   Final Pathologic Diagnosis 1. Allograft liver, time-zero, needle biopsy:   - Mild large- and small-droplet macrovesicular steatosis (5-10%)   - No significant fibrosis   - See comment   2. Explant liver, total hepatectomy:   - Adenocarcinoma involving liver, consistent with cholangiocarcinoma   - Background liver with steatohepatitis and cirrhosis (stage 4 of 4);   etiology CASTILLO per clinical history   - Pathologic stage: [id number]   - See synoptic report   SYNOPTIC   Procedure: Total hepatectomy   Histologic Type: Cholangiocarcinoma NOS    Histologic Grade: G3, poorly differentiated   Tumor Focality: Solitary tumor   Tumor Size: 7.0 cm   Tumor Extent: Confined to hepatic parenchyma   Lymphovascular Invasion: Not identified   Margin Status for Invasive Carcinoma:     All margins negative for invasive carcinoma   Margin Status for High-Grade Intraepithelial Neoplasia:     All margins negative for high-grade intraepithelial neoplasia   Regional Lymph Nodes:      Number of Lymph Nodes with Tumor: 0      Number of Lymph Nodes Examined: 1   Pathologic Stage (AJCC 8th Edition):   TNM Descriptors: y (post-treatment)   pT Category: pT1b; Solitary tumor greater than 5 cm without vascular invasion   pN Category: pN0; No regional lymph node metastases   Additional findings:   - Gallbladder with no diagnostic histopathologic alterations            Assessment:       1. 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 S/P liver transplant    4. Type 2 diabetes mellitus with hyperosmolarity without coma, unspecified whether long term insulin use    5. Aortic atherosclerosis    6. PAD (peripheral artery disease)    7. Hyperlipidemia, unspecified hyperlipidemia type    8. Anemia in neoplastic disease          Plan:             # Cholangiocarcinoma  His explant pathology after OLT on 1/6/22 showed cholangiocarcinoma.  He had received TACE and RFA to the mass that was presumed HCC in 12/2020.  No progression of disease until his transplant.  I discussed with him that the pathology demonstrated cholangiocarcinoma instead of HCC, which does confer a worsened prognosis in the way of higher chance of recurrence. There is essentially no data on optimal management in this scenario in terms of adjuvant chemotherapy - only that the risk of recurrence is relatively high.  Adjuvant therapy would be an extrapolation from surgically resected cholangiocarcinoma in the way of the BILCAP trial with Xeloda x 6 months.      Given his relatively poor performance status,  anemia and the marginal survival benefit of adjuvant chemotherapy even in non-transplant patients, I do not think that the benefits of chemotherapy outweigh the risk for Mr. Purdy. He is in agreement with this plan.  We will instead proceed with surveillance.    I will repeat a CT CAP in 3 months with plan for q3 months imaging for year 1.      # Liver transplant  Continue close f/u with liver transplant team.  Immunosuppressive therapy per transplant.    # T2DM, PAD, HLD, a-fib, aortic atherosclerosis.  Continue current medical management.    # Anemia  Had apparent blood loss anemia two weeks ago.  Stabilized, now restarted on Eliquis.  Not symptomatically severely anemic.  Last CBC 2/14 with improved hemoglobin.  He will continue to get short interval labs with transplant team.    Follow up: 3 months with imaging, labs.     The above information has been reviewed with the patient and all questions have been answered to their apparent satisfaction.  They understand that they can call the clinic with any questions.    John Ordoñez MD  Hematology/Oncology  [facility]    Route Chart for Scheduling    Med Onc Chart Routing      Follow up with physician 3 months.   Follow up with [facility]    Labs CBC, CMP and CA 19-9   Lab interval:     Imaging CT chest abdomen pelvis      Pharmacy appointment    Other referrals

## 2022-02-17 NOTE — TELEPHONE ENCOUNTER
Escalation done 3/5 for SOB. SpO2 of 96%, pulse 98, temp 98.9, NO to fever, NO to worsening S/S. And 0/3 for SOB with rest and activity. Speaking with patient, well educated due to daily calls. States he is no worse just the same. Will have HH soon. Instructed to call back for any further questions or concerns or worsening S/S. Verb understanding.

## 2022-02-18 ENCOUNTER — LAB VISIT (OUTPATIENT)
Dept: LAB | Facility: HOSPITAL | Age: 67
End: 2022-02-18
Attending: SURGERY
Payer: MEDICARE

## 2022-02-18 ENCOUNTER — PATIENT MESSAGE (OUTPATIENT)
Dept: ADMINISTRATIVE | Facility: OTHER | Age: 67
End: 2022-02-18
Payer: MEDICARE

## 2022-02-18 ENCOUNTER — TELEPHONE (OUTPATIENT)
Dept: TRANSPLANT | Facility: CLINIC | Age: 67
End: 2022-02-18
Payer: MEDICARE

## 2022-02-18 ENCOUNTER — NURSE TRIAGE (OUTPATIENT)
Dept: ADMINISTRATIVE | Facility: CLINIC | Age: 67
End: 2022-02-18
Payer: MEDICARE

## 2022-02-18 ENCOUNTER — PATIENT MESSAGE (OUTPATIENT)
Dept: ADMINISTRATIVE | Facility: CLINIC | Age: 67
End: 2022-02-18
Payer: MEDICARE

## 2022-02-18 DIAGNOSIS — Z94.4 LIVER REPLACED BY TRANSPLANT: ICD-10-CM

## 2022-02-18 LAB
ALBUMIN SERPL BCP-MCNC: 3.4 G/DL (ref 3.5–5.2)
ALP SERPL-CCNC: 93 U/L (ref 55–135)
ALT SERPL W/O P-5'-P-CCNC: 18 U/L (ref 10–44)
ANION GAP SERPL CALC-SCNC: 11 MMOL/L (ref 8–16)
AST SERPL-CCNC: 20 U/L (ref 10–40)
BASOPHILS # BLD AUTO: 0.03 K/UL (ref 0–0.2)
BASOPHILS NFR BLD: 0.6 % (ref 0–1.9)
BILIRUB DIRECT SERPL-MCNC: 0.3 MG/DL (ref 0.1–0.3)
BILIRUB SERPL-MCNC: 0.6 MG/DL (ref 0.1–1)
BUN SERPL-MCNC: 12 MG/DL (ref 8–23)
CALCIUM SERPL-MCNC: 9.2 MG/DL (ref 8.7–10.5)
CHLORIDE SERPL-SCNC: 101 MMOL/L (ref 95–110)
CO2 SERPL-SCNC: 28 MMOL/L (ref 23–29)
CREAT SERPL-MCNC: 1.2 MG/DL (ref 0.5–1.4)
DIFFERENTIAL METHOD: ABNORMAL
EOSINOPHIL # BLD AUTO: 0 K/UL (ref 0–0.5)
EOSINOPHIL NFR BLD: 0.4 % (ref 0–8)
ERYTHROCYTE [DISTWIDTH] IN BLOOD BY AUTOMATED COUNT: 14.5 % (ref 11.5–14.5)
EST. GFR  (AFRICAN AMERICAN): >60 ML/MIN/1.73 M^2
EST. GFR  (NON AFRICAN AMERICAN): >60 ML/MIN/1.73 M^2
GLUCOSE SERPL-MCNC: 159 MG/DL (ref 70–110)
HCT VFR BLD AUTO: 33.3 % (ref 40–54)
HGB BLD-MCNC: 9.7 G/DL (ref 14–18)
IMM GRANULOCYTES # BLD AUTO: 0.04 K/UL (ref 0–0.04)
IMM GRANULOCYTES NFR BLD AUTO: 0.8 % (ref 0–0.5)
LYMPHOCYTES # BLD AUTO: 0.5 K/UL (ref 1–4.8)
LYMPHOCYTES NFR BLD: 11.1 % (ref 18–48)
MCH RBC QN AUTO: 27.4 PG (ref 27–31)
MCHC RBC AUTO-ENTMCNC: 29.1 G/DL (ref 32–36)
MCV RBC AUTO: 94 FL (ref 82–98)
MONOCYTES # BLD AUTO: 0.5 K/UL (ref 0.3–1)
MONOCYTES NFR BLD: 10.9 % (ref 4–15)
NEUTROPHILS # BLD AUTO: 3.7 K/UL (ref 1.8–7.7)
NEUTROPHILS NFR BLD: 76.2 % (ref 38–73)
NRBC BLD-RTO: 0 /100 WBC
PLATELET # BLD AUTO: 179 K/UL (ref 150–450)
PMV BLD AUTO: 11.1 FL (ref 9.2–12.9)
POTASSIUM SERPL-SCNC: 4.4 MMOL/L (ref 3.5–5.1)
PROT SERPL-MCNC: 6.4 G/DL (ref 6–8.4)
RBC # BLD AUTO: 3.54 M/UL (ref 4.6–6.2)
SODIUM SERPL-SCNC: 140 MMOL/L (ref 136–145)
WBC # BLD AUTO: 4.88 K/UL (ref 3.9–12.7)

## 2022-02-18 PROCEDURE — 80197 ASSAY OF TACROLIMUS: CPT | Performed by: SURGERY

## 2022-02-18 PROCEDURE — 80053 COMPREHEN METABOLIC PANEL: CPT | Performed by: SURGERY

## 2022-02-18 PROCEDURE — 36415 COLL VENOUS BLD VENIPUNCTURE: CPT | Mod: PN | Performed by: SURGERY

## 2022-02-18 PROCEDURE — 85025 COMPLETE CBC W/AUTO DIFF WBC: CPT | Performed by: SURGERY

## 2022-02-18 PROCEDURE — 82248 BILIRUBIN DIRECT: CPT | Performed by: SURGERY

## 2022-02-18 NOTE — TELEPHONE ENCOUNTER
Received medium Priority Escalation via COVID Surveillance Program. Patient submitted the following abnormal data 3/5 SAMSON. Patient called. Patient reports the following feeling the same. No worsening in breathing. Only SOB with activity. No SOB at rest. Cough improved/ resolving. Speaking in complete sentences without difficulty. No audile wheezes noted. Pt states he has no additional concerns at this time. Advised to call back with concerns or worsening symptoms. Verbalized understanding.     Reason for Disposition   Pulse oximetry, questions about    Additional Information   Negative: SEVERE difficulty breathing (e.g., struggling for each breath, speaks in single words, pulse > 120)   Negative: Bluish (or gray) lips or face now   Negative: Difficult to awaken or acting confused (e.g., disoriented, slurred speech)   Negative: Slow, shallow and weak breathing   Negative: Sounds like a life-threatening emergency to the triager   Negative: [1] MODERATE difficulty breathing (e.g., speaks in phrases, SOB even at rest, pulse 100 - 120) AND [2] new-onset or worse than normal   Negative: [1] MODERATE difficulty breathing AND [2] oxygen level (e.g., pulse oximetry) 91 to 94 percent   Negative: Oxygen level (e.g., pulse oximetry) 90 percent or lower   Negative: Patient sounds very sick or weak to the triager   Negative: [1] MODERATE difficulty breathing (e.g., speaks in phrases, SOB even at rest, pulse 100 - 120) AND [2] NOT new-onset or worse than normal   Negative: [1] Drinking very little AND [2] dehydration suspected (e.g., no urine > 12 hours, very dry mouth, very lightheaded)   Negative: [1] MILD difficulty breathing (e.g., minimal/no SOB at rest, SOB with walking, pulse <100) AND [2] new-onset   Negative: Fever > 100.4 F (38.0 C)   Negative: Nurse judgment   Negative: [1] Fall in oxygen level 4% or more (below known patient baseline, while awake and resting) AND [2] new or worse difficulty breathing    Negative: Oxygen level (e.g., pulse oximetry) 91 to 94 percent   Negative: Pulse (heart rate) > 120 beats per minute   Negative: [1] Caller has URGENT question AND [2] triager unable to answer question   Negative: [1] MILD difficulty breathing  (e.g., minimal/no SOB at rest, SOB with walking) AND [2] worse than normal   Negative: Fever present > 3 days (72 hours)   Negative: [1] Fever returns after gone for over 24 hours AND [2] symptoms worse or not improved   Negative: [1] Receiving oxygen therapy (e.g., nasal cannula, mask) AND [2] oxygen level 96% or higher AND [3] three times in 1 week   Negative: Pulse (heart rate) > 100 beats per minute   Negative: Coughing up yellow-green sputum   Negative: Coughing up renu-colored (reddish-brown) sputum   Negative: [1] Caller has NON-URGENT question AND [2] triager unable to answer question   Negative: [1] Fall in oxygen level 2% or more (below known patient baseline, awake while at rest) AND [2] three times in 1 week   Negative: [1] Rise in oxygen level 2% or more (above known patient baseline, awake while at rest) AND [2] three times in 1 week.    Protocols used: OXYGEN MONITORING AND [id number]

## 2022-02-18 NOTE — TELEPHONE ENCOUNTER
Pt contacted for no response - pt reports he did in fact enter VS but will resubmit if needed. Denies any increased sob or need for triage and declines from re doing VS with NT on line.     Reason for Disposition   Information only question and nurse able to answer    Protocols used: INFORMATION ONLY CALL - NO TRIAGE-A-OH

## 2022-02-19 ENCOUNTER — PATIENT MESSAGE (OUTPATIENT)
Dept: ADMINISTRATIVE | Facility: OTHER | Age: 67
End: 2022-02-19
Payer: MEDICARE

## 2022-02-19 ENCOUNTER — NURSE TRIAGE (OUTPATIENT)
Dept: ADMINISTRATIVE | Facility: CLINIC | Age: 67
End: 2022-02-19
Payer: MEDICARE

## 2022-02-19 LAB — TACROLIMUS BLD-MCNC: 9.7 NG/ML (ref 5–15)

## 2022-02-19 NOTE — TELEPHONE ENCOUNTER
Pt contacted for Surveillance escalation - SOB 3/5 with activity. Denies any fever or increased SOB, pt able to speak in full sentences without noted SOB or cough. Covid 19  protocol used and pt advised to contact PCP now and on home care. Pt reports he has already spoken with provider and does not feel symptoms have worsened or any new symptoms. Pt instructed to call OOC for worsening of symptoms or health questions.    Reason for Disposition   MILD difficulty breathing (e.g., minimal/no SOB at rest, SOB with walking, pulse <100)    Additional Information   Negative: Severe difficulty breathing (e.g., struggling for each breath, speaks in single words)   Negative: Difficult to awaken or acting confused (e.g., disoriented, slurred speech)   Negative: Bluish (or gray) lips or face now   Negative: Shock suspected (e.g., cold/pale/clammy skin, too weak to stand, low BP, rapid pulse)   Negative: Sounds like a life-threatening emergency to the triager   Negative: [1] COVID-19 suspected (e.g., cough, fever, shortness of breath) AND [2] mild symptoms AND [3] public health department recommends testing   Negative: [1] COVID-19 exposure AND [2] no symptoms   Negative: COVID-19 and Breastfeeding, questions about   Negative: SEVERE or constant chest pain (Exception: mild central chest pain, present only when coughing)   Negative: MODERATE difficulty breathing (e.g., speaks in phrases, SOB even at rest, pulse 100-120)   Negative: Patient sounds very sick or weak to the triager    Protocols used: CORONAVIRUS (COVID-19) - DIAGNOSED OR [id number]

## 2022-02-19 NOTE — TELEPHONE ENCOUNTER
Received medium Priority Escalation via COVID Surveillance Program. Patient submitted the following abnormal data .3/5 SOB with activity. Patient called. Patient reports the following feels the same as previous. No worsening SOB with activity and no decrease in the amount of activity he can tolerate. Speaking in complete sentences without difficulty. No audile wheezes noted. Pt had triage earlier this morning and reports unchanged.  Pt advised to call back with concerns or worsening condition. Verbalized understanding.     Reason for Disposition   [1] Follow-up call to recent contact AND [2] information only call, no triage required    Additional Information   Negative: RN needs further essential information from caller in order to complete triage   Negative: Requesting regular office appointment   Negative: [1] Caller requesting NON-URGENT health information AND [2] PCP's office is the best resource    Protocols used: INFORMATION ONLY CALL - NO TRIAGE-A-

## 2022-02-20 ENCOUNTER — NURSE TRIAGE (OUTPATIENT)
Dept: ADMINISTRATIVE | Facility: CLINIC | Age: 67
End: 2022-02-20
Payer: MEDICARE

## 2022-02-20 ENCOUNTER — PATIENT MESSAGE (OUTPATIENT)
Dept: ADMINISTRATIVE | Facility: OTHER | Age: 67
End: 2022-02-20
Payer: MEDICARE

## 2022-02-20 ENCOUNTER — TELEPHONE (OUTPATIENT)
Dept: TRANSPLANT | Facility: CLINIC | Age: 67
End: 2022-02-20
Payer: MEDICARE

## 2022-02-20 NOTE — TELEPHONE ENCOUNTER
DATE OF PROCEDURE:  09/27/2019.    PREOPERATIVE DIAGNOSIS:  Transitional cell carcinoma of the bladder, initially   diagnosed in 2016, was found to have a low-grade recurrence on 03/12/2019.    POSTOPERATIVE DIAGNOSES:  Transitional cell carcinoma of the bladder, initially   diagnosed in 2016, was found to have a low-grade recurrence on 03/12/2019.  Evidence of a right lateral lobe bladder lesion.    PROCEDURES PERFORMED:  Cystourethroscopy, biopsy and fulguration.    SURGEON:  Augustus Melchor M.D.    ANESTHESIA:  Local.    PROCEDURE IN DETAIL:  The patient was placed in lithotomy position.  Genitalia   were prepped and draped in usual manner.  The flexible cystoscope was introduced   under direct vision into the urethra.  Examination of the urethra was   unremarkable.  The instrument was advanced into the bladder and the interior of   the bladder was then examined.  The trigone was symmetrically configurated.    Both orifices were slit-like and had clear efflux.  Bladder mucosa revealed   areas of whitish scarring on the right lateral wall of the bladder where a prior   lesion had been resected and careful examination of the bladder mucosa revealed   an approximately 4 to 5 mm frond-like lesion adjacent to the above-mentioned   whitish scarring towards the bladder neck area, but the lesion was actually on   the right lateral wall of the bladder.  The above-mentioned lesion was then   biopsied and in fact, the process of the biopsy did in fact appear to remove the   whole lesion.  The biopsy site was then fulgurated with Bugbee electrode.    Examination of the rest of the bladder did not identify any other lesions other   than that noted on the right lateral wall of the bladder.  At the end of the   procedure, there was no evidence of any undue bleeding.  The instrument was   removed.  The patient emptied the bladder.    FINAL IMPRESSION:  History of transitional cell carcinoma of the bladder,   initially diagnosed  Pt escalated due to reporting SOB 3/5 with activity.Pt.denies worsening SOB, and CP. Pt pt noted to be speaking in full complete sentences at this time. declined triage at this time. Advised to call back with concerns.    Reason for Disposition   Health Information question, no triage required and triager able to answer question    Protocols used: INFORMATION ONLY CALL - NO TRIAGE-A-       in 2016, evidence of a lesion noted on today's evaluation.    It was biopsied and fulgurated.    RECOMMENDATIONS:  Keflex 500 mg p.o. t.i.d.  We will recheck in the office in 10   to 14 days to discuss the further treatment plan pending the pathology report.      BRONSON  dd: 09/27/2019 11:54:52 (CDT)  td: 09/28/2019 02:27:13 (CDT)  Doc ID   #[id number]  Job ID #[id number]    CC:

## 2022-02-21 ENCOUNTER — PATIENT MESSAGE (OUTPATIENT)
Dept: TRANSPLANT | Facility: CLINIC | Age: 67
End: 2022-02-21
Payer: MEDICARE

## 2022-02-21 NOTE — TELEPHONE ENCOUNTER
Spoke to pt 2/19/22 @ 2;22 PM.  Labs reviewed with .  Stable FK level, no changes. Next labs 2/22/22.  Pt verbalized understanding.

## 2022-02-22 ENCOUNTER — LAB VISIT (OUTPATIENT)
Dept: LAB | Facility: HOSPITAL | Age: 67
End: 2022-02-22
Attending: SURGERY
Payer: MEDICARE

## 2022-02-22 DIAGNOSIS — Z94.4 LIVER REPLACED BY TRANSPLANT: ICD-10-CM

## 2022-02-22 DIAGNOSIS — D84.9 IMMUNOSUPPRESSED STATUS: ICD-10-CM

## 2022-02-22 DIAGNOSIS — E83.42 HYPOMAGNESEMIA: ICD-10-CM

## 2022-02-22 LAB
ALBUMIN SERPL BCP-MCNC: 3.2 G/DL (ref 3.5–5.2)
ALP SERPL-CCNC: 90 U/L (ref 55–135)
ALT SERPL W/O P-5'-P-CCNC: 15 U/L (ref 10–44)
ANION GAP SERPL CALC-SCNC: 10 MMOL/L (ref 8–16)
AST SERPL-CCNC: 18 U/L (ref 10–40)
BASOPHILS # BLD AUTO: 0.04 K/UL (ref 0–0.2)
BASOPHILS NFR BLD: 1 % (ref 0–1.9)
BILIRUB DIRECT SERPL-MCNC: 0.2 MG/DL (ref 0.1–0.3)
BILIRUB SERPL-MCNC: 0.5 MG/DL (ref 0.1–1)
BUN SERPL-MCNC: 11 MG/DL (ref 8–23)
CALCIUM SERPL-MCNC: 9.3 MG/DL (ref 8.7–10.5)
CHLORIDE SERPL-SCNC: 100 MMOL/L (ref 95–110)
CO2 SERPL-SCNC: 27 MMOL/L (ref 23–29)
CREAT SERPL-MCNC: 1.2 MG/DL (ref 0.5–1.4)
DIFFERENTIAL METHOD: ABNORMAL
EOSINOPHIL # BLD AUTO: 0 K/UL (ref 0–0.5)
EOSINOPHIL NFR BLD: 0.7 % (ref 0–8)
ERYTHROCYTE [DISTWIDTH] IN BLOOD BY AUTOMATED COUNT: 14.6 % (ref 11.5–14.5)
EST. GFR  (AFRICAN AMERICAN): >60 ML/MIN/1.73 M^2
EST. GFR  (NON AFRICAN AMERICAN): >60 ML/MIN/1.73 M^2
GLUCOSE SERPL-MCNC: 86 MG/DL (ref 70–110)
HCT VFR BLD AUTO: 30.7 % (ref 40–54)
HGB BLD-MCNC: 9.1 G/DL (ref 14–18)
IMM GRANULOCYTES # BLD AUTO: 0.03 K/UL (ref 0–0.04)
IMM GRANULOCYTES NFR BLD AUTO: 0.7 % (ref 0–0.5)
LYMPHOCYTES # BLD AUTO: 0.7 K/UL (ref 1–4.8)
LYMPHOCYTES NFR BLD: 17.2 % (ref 18–48)
MAGNESIUM SERPL-MCNC: 1.6 MG/DL (ref 1.6–2.6)
MCH RBC QN AUTO: 27.4 PG (ref 27–31)
MCHC RBC AUTO-ENTMCNC: 29.6 G/DL (ref 32–36)
MCV RBC AUTO: 93 FL (ref 82–98)
MONOCYTES # BLD AUTO: 0.5 K/UL (ref 0.3–1)
MONOCYTES NFR BLD: 12.7 % (ref 4–15)
NEUTROPHILS # BLD AUTO: 2.7 K/UL (ref 1.8–7.7)
NEUTROPHILS NFR BLD: 67.7 % (ref 38–73)
NRBC BLD-RTO: 0 /100 WBC
PLATELET # BLD AUTO: 194 K/UL (ref 150–450)
PMV BLD AUTO: 10.7 FL (ref 9.2–12.9)
POTASSIUM SERPL-SCNC: 4.3 MMOL/L (ref 3.5–5.1)
PROT SERPL-MCNC: 6.1 G/DL (ref 6–8.4)
RBC # BLD AUTO: 3.32 M/UL (ref 4.6–6.2)
SODIUM SERPL-SCNC: 137 MMOL/L (ref 136–145)
WBC # BLD AUTO: 4.01 K/UL (ref 3.9–12.7)

## 2022-02-22 PROCEDURE — 83735 ASSAY OF MAGNESIUM: CPT | Performed by: SURGERY

## 2022-02-22 PROCEDURE — 80053 COMPREHEN METABOLIC PANEL: CPT | Performed by: SURGERY

## 2022-02-22 PROCEDURE — 85025 COMPLETE CBC W/AUTO DIFF WBC: CPT | Performed by: SURGERY

## 2022-02-22 PROCEDURE — 82248 BILIRUBIN DIRECT: CPT | Performed by: SURGERY

## 2022-02-22 PROCEDURE — G0180 PR HOME HEALTH MD CERTIFICATION: ICD-10-PCS | Mod: ,,, | Performed by: TRANSPLANT SURGERY

## 2022-02-22 PROCEDURE — 80197 ASSAY OF TACROLIMUS: CPT | Performed by: SURGERY

## 2022-02-22 PROCEDURE — 36415 COLL VENOUS BLD VENIPUNCTURE: CPT | Mod: PN | Performed by: SURGERY

## 2022-02-22 PROCEDURE — G0180 MD CERTIFICATION HHA PATIENT: HCPCS | Mod: ,,, | Performed by: TRANSPLANT SURGERY

## 2022-02-23 ENCOUNTER — TELEPHONE (OUTPATIENT)
Dept: TRANSPLANT | Facility: CLINIC | Age: 67
End: 2022-02-23
Payer: MEDICARE

## 2022-02-23 ENCOUNTER — PATIENT MESSAGE (OUTPATIENT)
Dept: TRANSPLANT | Facility: CLINIC | Age: 67
End: 2022-02-23
Payer: MEDICARE

## 2022-02-23 LAB — TACROLIMUS BLD-MCNC: 8.8 NG/ML (ref 5–15)

## 2022-02-23 NOTE — TELEPHONE ENCOUNTER
Returned call let voicemail for call back    ----- Message from Barak Fernandez sent at 2/23/2022  1:18 PM CST -----  Regarding: Medication interactions  Mai @ [facility] calling regarding medication interaction between these Rx    lovastatin (MEVACOR) 20 MG tablet  amiodarone (PACERONE) 200 MG Tab    Call: [phone] ext [phone]

## 2022-02-23 NOTE — TELEPHONE ENCOUNTER
Called Mai at patient's home health  ext [phone] to let her know should be ok, will monitor.    ----- Message from Uri Ugarte PharmD sent at 2/23/2022  1:58 PM CST -----  Lovastatin at doses over 40 mg/day with amiodarone are to be used with caution and switched to pravastatin.  Patient is on lovastatin 20 mg daily therefore the interaction is okay but still should be monitored for side effects such as, increased LFTs, rhabdo and myalgias.      Uri  ----- Message -----  From: Aniya Amaya RN  Sent: 2/23/2022   1:30 PM CST  To: Uri Ugarte PharmD    He is on Amiodarone and Lovastatin?  The  called because of interaction.  Is this ok?        
PAST SURGICAL HISTORY:  No significant past surgical history

## 2022-02-23 NOTE — TELEPHONE ENCOUNTER
Sent patient message via portal to let [facility] know labs are stable.  No medication changes, next labs due on 02/2/22      ----- Message from Werner Zamarripa MD sent at 2/23/2022 12:56 PM CST -----  Results ok. No action needed

## 2022-02-28 ENCOUNTER — LAB VISIT (OUTPATIENT)
Dept: LAB | Facility: HOSPITAL | Age: 67
End: 2022-02-28
Attending: SURGERY
Payer: MEDICARE

## 2022-02-28 DIAGNOSIS — Z94.4 LIVER REPLACED BY TRANSPLANT: ICD-10-CM

## 2022-02-28 DIAGNOSIS — E83.42 HYPOMAGNESEMIA: ICD-10-CM

## 2022-02-28 LAB
ALBUMIN SERPL BCP-MCNC: 3.3 G/DL (ref 3.5–5.2)
ALP SERPL-CCNC: 83 U/L (ref 55–135)
ALT SERPL W/O P-5'-P-CCNC: 11 U/L (ref 10–44)
ANION GAP SERPL CALC-SCNC: 13 MMOL/L (ref 8–16)
AST SERPL-CCNC: 19 U/L (ref 10–40)
BASOPHILS # BLD AUTO: 0.06 K/UL (ref 0–0.2)
BASOPHILS NFR BLD: 1.2 % (ref 0–1.9)
BILIRUB DIRECT SERPL-MCNC: 0.2 MG/DL (ref 0.1–0.3)
BILIRUB SERPL-MCNC: 0.4 MG/DL (ref 0.1–1)
BUN SERPL-MCNC: 9 MG/DL (ref 8–23)
CALCIUM SERPL-MCNC: 8.9 MG/DL (ref 8.7–10.5)
CHLORIDE SERPL-SCNC: 102 MMOL/L (ref 95–110)
CO2 SERPL-SCNC: 24 MMOL/L (ref 23–29)
CREAT SERPL-MCNC: 1 MG/DL (ref 0.5–1.4)
DIFFERENTIAL METHOD: ABNORMAL
EOSINOPHIL # BLD AUTO: 0.1 K/UL (ref 0–0.5)
EOSINOPHIL NFR BLD: 1 % (ref 0–8)
ERYTHROCYTE [DISTWIDTH] IN BLOOD BY AUTOMATED COUNT: 14.5 % (ref 11.5–14.5)
EST. GFR  (AFRICAN AMERICAN): >60 ML/MIN/1.73 M^2
EST. GFR  (NON AFRICAN AMERICAN): >60 ML/MIN/1.73 M^2
GLUCOSE SERPL-MCNC: 114 MG/DL (ref 70–110)
HCT VFR BLD AUTO: 31.9 % (ref 40–54)
HGB BLD-MCNC: 9.4 G/DL (ref 14–18)
IMM GRANULOCYTES # BLD AUTO: 0.03 K/UL (ref 0–0.04)
IMM GRANULOCYTES NFR BLD AUTO: 0.6 % (ref 0–0.5)
LYMPHOCYTES # BLD AUTO: 0.6 K/UL (ref 1–4.8)
LYMPHOCYTES NFR BLD: 11.5 % (ref 18–48)
MAGNESIUM SERPL-MCNC: 1.2 MG/DL (ref 1.6–2.6)
MCH RBC QN AUTO: 26.1 PG (ref 27–31)
MCHC RBC AUTO-ENTMCNC: 29.5 G/DL (ref 32–36)
MCV RBC AUTO: 89 FL (ref 82–98)
MONOCYTES # BLD AUTO: 0.6 K/UL (ref 0.3–1)
MONOCYTES NFR BLD: 11.5 % (ref 4–15)
NEUTROPHILS # BLD AUTO: 3.9 K/UL (ref 1.8–7.7)
NEUTROPHILS NFR BLD: 74.2 % (ref 38–73)
NRBC BLD-RTO: 0 /100 WBC
PLATELET # BLD AUTO: 244 K/UL (ref 150–450)
PMV BLD AUTO: 10.7 FL (ref 9.2–12.9)
POTASSIUM SERPL-SCNC: 3.8 MMOL/L (ref 3.5–5.1)
PROT SERPL-MCNC: 6.2 G/DL (ref 6–8.4)
RBC # BLD AUTO: 3.6 M/UL (ref 4.6–6.2)
SODIUM SERPL-SCNC: 139 MMOL/L (ref 136–145)
WBC # BLD AUTO: 5.2 K/UL (ref 3.9–12.7)

## 2022-02-28 PROCEDURE — 80053 COMPREHEN METABOLIC PANEL: CPT | Performed by: SURGERY

## 2022-02-28 PROCEDURE — 80197 ASSAY OF TACROLIMUS: CPT | Performed by: SURGERY

## 2022-02-28 PROCEDURE — 36415 COLL VENOUS BLD VENIPUNCTURE: CPT | Mod: PN | Performed by: SURGERY

## 2022-02-28 PROCEDURE — 83735 ASSAY OF MAGNESIUM: CPT | Performed by: SURGERY

## 2022-02-28 PROCEDURE — 85025 COMPLETE CBC W/AUTO DIFF WBC: CPT | Performed by: SURGERY

## 2022-02-28 PROCEDURE — 82248 BILIRUBIN DIRECT: CPT | Performed by: SURGERY

## 2022-03-01 ENCOUNTER — TELEPHONE (OUTPATIENT)
Dept: TRANSPLANT | Facility: CLINIC | Age: 67
End: 2022-03-01
Payer: MEDICARE

## 2022-03-01 LAB — TACROLIMUS BLD-MCNC: 9.4 NG/ML (ref 5–15)

## 2022-03-01 NOTE — TELEPHONE ENCOUNTER
ON CALL NOTE @1045:     Labs dated 2/28/2022 reviewed with Dr. Garcia. Labs stable, including Tacrolimus level 9.4.     Plan: No changes. Continue Prograf 1 mg bid.     Patient notified and advised to proceed with future appointments as scheduled. Understanding expressed.

## 2022-03-02 LAB — FUNGUS SPEC CULT: NORMAL

## 2022-03-02 RX ORDER — ONDANSETRON 4 MG/1
4 TABLET, ORALLY DISINTEGRATING ORAL EVERY 8 HOURS PRN
Qty: 15 TABLET | Refills: 0 | Status: SHIPPED | OUTPATIENT
Start: 2022-03-02 | End: 2022-03-30 | Stop reason: SDUPTHER

## 2022-03-04 ENCOUNTER — TELEPHONE (OUTPATIENT)
Dept: TRANSPLANT | Facility: CLINIC | Age: 67
End: 2022-03-04
Payer: MEDICARE

## 2022-03-04 NOTE — TELEPHONE ENCOUNTER
"Called patient to follow up on the nausea her reported earlier in the week.  He reports, "The medicine you gave me really helped"  Advised let me know if he has any other issues.  "

## 2022-03-07 ENCOUNTER — LAB VISIT (OUTPATIENT)
Dept: LAB | Facility: HOSPITAL | Age: 67
End: 2022-03-07
Attending: SURGERY
Payer: MEDICARE

## 2022-03-07 DIAGNOSIS — Z94.4 LIVER REPLACED BY TRANSPLANT: ICD-10-CM

## 2022-03-07 DIAGNOSIS — E83.42 HYPOMAGNESEMIA: ICD-10-CM

## 2022-03-07 LAB
ALBUMIN SERPL BCP-MCNC: 3.5 G/DL (ref 3.5–5.2)
ALP SERPL-CCNC: 81 U/L (ref 55–135)
ALT SERPL W/O P-5'-P-CCNC: 10 U/L (ref 10–44)
ANION GAP SERPL CALC-SCNC: 13 MMOL/L (ref 8–16)
AST SERPL-CCNC: 21 U/L (ref 10–40)
BASOPHILS # BLD AUTO: 0.07 K/UL (ref 0–0.2)
BASOPHILS NFR BLD: 1.3 % (ref 0–1.9)
BILIRUB DIRECT SERPL-MCNC: 0.2 MG/DL (ref 0.1–0.3)
BILIRUB SERPL-MCNC: 0.4 MG/DL (ref 0.1–1)
BUN SERPL-MCNC: 12 MG/DL (ref 8–23)
CALCIUM SERPL-MCNC: 9 MG/DL (ref 8.7–10.5)
CHLORIDE SERPL-SCNC: 102 MMOL/L (ref 95–110)
CO2 SERPL-SCNC: 24 MMOL/L (ref 23–29)
CREAT SERPL-MCNC: 1 MG/DL (ref 0.5–1.4)
DIFFERENTIAL METHOD: ABNORMAL
EOSINOPHIL # BLD AUTO: 0 K/UL (ref 0–0.5)
EOSINOPHIL NFR BLD: 0.7 % (ref 0–8)
ERYTHROCYTE [DISTWIDTH] IN BLOOD BY AUTOMATED COUNT: 14.8 % (ref 11.5–14.5)
EST. GFR  (AFRICAN AMERICAN): >60 ML/MIN/1.73 M^2
EST. GFR  (NON AFRICAN AMERICAN): >60 ML/MIN/1.73 M^2
GLUCOSE SERPL-MCNC: 68 MG/DL (ref 70–110)
HCT VFR BLD AUTO: 31.3 % (ref 40–54)
HGB BLD-MCNC: 9.4 G/DL (ref 14–18)
IMM GRANULOCYTES # BLD AUTO: 0.07 K/UL (ref 0–0.04)
IMM GRANULOCYTES NFR BLD AUTO: 1.3 % (ref 0–0.5)
LYMPHOCYTES # BLD AUTO: 0.8 K/UL (ref 1–4.8)
LYMPHOCYTES NFR BLD: 13.8 % (ref 18–48)
MAGNESIUM SERPL-MCNC: 1.4 MG/DL (ref 1.6–2.6)
MCH RBC QN AUTO: 26.1 PG (ref 27–31)
MCHC RBC AUTO-ENTMCNC: 30 G/DL (ref 32–36)
MCV RBC AUTO: 87 FL (ref 82–98)
MONOCYTES # BLD AUTO: 0.6 K/UL (ref 0.3–1)
MONOCYTES NFR BLD: 11.8 % (ref 4–15)
NEUTROPHILS # BLD AUTO: 3.9 K/UL (ref 1.8–7.7)
NEUTROPHILS NFR BLD: 71.1 % (ref 38–73)
NRBC BLD-RTO: 0 /100 WBC
PLATELET # BLD AUTO: 209 K/UL (ref 150–450)
PMV BLD AUTO: 11.4 FL (ref 9.2–12.9)
POTASSIUM SERPL-SCNC: 4.1 MMOL/L (ref 3.5–5.1)
PROT SERPL-MCNC: 6.4 G/DL (ref 6–8.4)
RBC # BLD AUTO: 3.6 M/UL (ref 4.6–6.2)
SODIUM SERPL-SCNC: 139 MMOL/L (ref 136–145)
WBC # BLD AUTO: 5.42 K/UL (ref 3.9–12.7)

## 2022-03-07 PROCEDURE — 80053 COMPREHEN METABOLIC PANEL: CPT | Performed by: SURGERY

## 2022-03-07 PROCEDURE — 80197 ASSAY OF TACROLIMUS: CPT | Performed by: SURGERY

## 2022-03-07 PROCEDURE — 85025 COMPLETE CBC W/AUTO DIFF WBC: CPT | Performed by: SURGERY

## 2022-03-07 PROCEDURE — 82248 BILIRUBIN DIRECT: CPT | Performed by: SURGERY

## 2022-03-07 PROCEDURE — 83735 ASSAY OF MAGNESIUM: CPT | Performed by: SURGERY

## 2022-03-07 PROCEDURE — 36415 COLL VENOUS BLD VENIPUNCTURE: CPT | Mod: PN | Performed by: SURGERY

## 2022-03-08 ENCOUNTER — TELEPHONE (OUTPATIENT)
Dept: TRANSPLANT | Facility: CLINIC | Age: 67
End: 2022-03-08
Payer: MEDICARE

## 2022-03-08 ENCOUNTER — PATIENT MESSAGE (OUTPATIENT)
Dept: TRANSPLANT | Facility: CLINIC | Age: 67
End: 2022-03-08
Payer: MEDICARE

## 2022-03-08 LAB — TACROLIMUS BLD-MCNC: 7.7 NG/ML (ref 5–15)

## 2022-03-08 NOTE — TELEPHONE ENCOUNTER
Reviewed labs with Dr Munoz, no changes.  Next labs on 03/14/22.  Sent message to let patient know

## 2022-03-14 ENCOUNTER — LAB VISIT (OUTPATIENT)
Dept: LAB | Facility: HOSPITAL | Age: 67
End: 2022-03-14
Attending: SURGERY
Payer: MEDICARE

## 2022-03-14 DIAGNOSIS — Z94.4 LIVER REPLACED BY TRANSPLANT: ICD-10-CM

## 2022-03-14 LAB
ALBUMIN SERPL BCP-MCNC: 3.6 G/DL (ref 3.5–5.2)
ALP SERPL-CCNC: 86 U/L (ref 55–135)
ALT SERPL W/O P-5'-P-CCNC: 12 U/L (ref 10–44)
ANION GAP SERPL CALC-SCNC: 8 MMOL/L (ref 8–16)
AST SERPL-CCNC: 20 U/L (ref 10–40)
BASOPHILS # BLD AUTO: 0.07 K/UL (ref 0–0.2)
BASOPHILS NFR BLD: 1.4 % (ref 0–1.9)
BILIRUB DIRECT SERPL-MCNC: 0.2 MG/DL (ref 0.1–0.3)
BILIRUB SERPL-MCNC: 0.4 MG/DL (ref 0.1–1)
BUN SERPL-MCNC: 9 MG/DL (ref 8–23)
CALCIUM SERPL-MCNC: 9.6 MG/DL (ref 8.7–10.5)
CHLORIDE SERPL-SCNC: 101 MMOL/L (ref 95–110)
CO2 SERPL-SCNC: 29 MMOL/L (ref 23–29)
CREAT SERPL-MCNC: 1 MG/DL (ref 0.5–1.4)
DIFFERENTIAL METHOD: ABNORMAL
EOSINOPHIL # BLD AUTO: 0.1 K/UL (ref 0–0.5)
EOSINOPHIL NFR BLD: 1.6 % (ref 0–8)
ERYTHROCYTE [DISTWIDTH] IN BLOOD BY AUTOMATED COUNT: 15.1 % (ref 11.5–14.5)
EST. GFR  (AFRICAN AMERICAN): >60 ML/MIN/1.73 M^2
EST. GFR  (NON AFRICAN AMERICAN): >60 ML/MIN/1.73 M^2
GLUCOSE SERPL-MCNC: 103 MG/DL (ref 70–110)
HCT VFR BLD AUTO: 31.4 % (ref 40–54)
HGB BLD-MCNC: 9.1 G/DL (ref 14–18)
IMM GRANULOCYTES # BLD AUTO: 0.11 K/UL (ref 0–0.04)
IMM GRANULOCYTES NFR BLD AUTO: 2.2 % (ref 0–0.5)
LYMPHOCYTES # BLD AUTO: 0.8 K/UL (ref 1–4.8)
LYMPHOCYTES NFR BLD: 14.7 % (ref 18–48)
MCH RBC QN AUTO: 25.3 PG (ref 27–31)
MCHC RBC AUTO-ENTMCNC: 29 G/DL (ref 32–36)
MCV RBC AUTO: 87 FL (ref 82–98)
MONOCYTES # BLD AUTO: 0.6 K/UL (ref 0.3–1)
MONOCYTES NFR BLD: 11.6 % (ref 4–15)
NEUTROPHILS # BLD AUTO: 3.5 K/UL (ref 1.8–7.7)
NEUTROPHILS NFR BLD: 68.5 % (ref 38–73)
NRBC BLD-RTO: 0 /100 WBC
PLATELET # BLD AUTO: 195 K/UL (ref 150–450)
PMV BLD AUTO: 11.2 FL (ref 9.2–12.9)
POTASSIUM SERPL-SCNC: 4.4 MMOL/L (ref 3.5–5.1)
PROT SERPL-MCNC: 6.5 G/DL (ref 6–8.4)
RBC # BLD AUTO: 3.6 M/UL (ref 4.6–6.2)
SODIUM SERPL-SCNC: 138 MMOL/L (ref 136–145)
WBC # BLD AUTO: 5.1 K/UL (ref 3.9–12.7)

## 2022-03-14 PROCEDURE — 80053 COMPREHEN METABOLIC PANEL: CPT | Performed by: SURGERY

## 2022-03-14 PROCEDURE — 82248 BILIRUBIN DIRECT: CPT | Performed by: SURGERY

## 2022-03-14 PROCEDURE — 36415 COLL VENOUS BLD VENIPUNCTURE: CPT | Mod: PN | Performed by: SURGERY

## 2022-03-14 PROCEDURE — 80197 ASSAY OF TACROLIMUS: CPT | Performed by: SURGERY

## 2022-03-14 PROCEDURE — 85025 COMPLETE CBC W/AUTO DIFF WBC: CPT | Performed by: SURGERY

## 2022-03-15 ENCOUNTER — TELEPHONE (OUTPATIENT)
Dept: TRANSPLANT | Facility: CLINIC | Age: 67
End: 2022-03-15
Payer: MEDICARE

## 2022-03-15 ENCOUNTER — EXTERNAL HOME HEALTH (OUTPATIENT)
Dept: HOME HEALTH SERVICES | Facility: HOSPITAL | Age: 67
End: 2022-03-15
Payer: MEDICARE

## 2022-03-15 ENCOUNTER — PATIENT MESSAGE (OUTPATIENT)
Dept: TRANSPLANT | Facility: CLINIC | Age: 67
End: 2022-03-15
Payer: MEDICARE

## 2022-03-15 DIAGNOSIS — Z94.4 S/P LIVER TRANSPLANT: Primary | ICD-10-CM

## 2022-03-15 DIAGNOSIS — E83.42 HYPOMAGNESEMIA: ICD-10-CM

## 2022-03-15 LAB — TACROLIMUS BLD-MCNC: 9.2 NG/ML (ref 5–15)

## 2022-03-15 NOTE — TELEPHONE ENCOUNTER
Sent patient message via portal to let him know labs are stable.  No medication changes, next labs due on 3/21/22      ----- Message from Janna Fernandez sent at 3/9/2022 12:54 PM CST -----

## 2022-03-16 ENCOUNTER — OFFICE VISIT (OUTPATIENT)
Dept: TRANSPLANT | Facility: CLINIC | Age: 67
End: 2022-03-16
Payer: MEDICARE

## 2022-03-16 VITALS
WEIGHT: 235.81 LBS | DIASTOLIC BLOOD PRESSURE: 56 MMHG | BODY MASS INDEX: 31.94 KG/M2 | SYSTOLIC BLOOD PRESSURE: 116 MMHG | TEMPERATURE: 98 F | HEART RATE: 75 BPM | OXYGEN SATURATION: 96 % | HEIGHT: 72 IN | RESPIRATION RATE: 18 BRPM

## 2022-03-16 DIAGNOSIS — C22.1 CHOLANGIOCARCINOMA: ICD-10-CM

## 2022-03-16 DIAGNOSIS — Z29.89 PROPHYLACTIC IMMUNOTHERAPY: ICD-10-CM

## 2022-03-16 DIAGNOSIS — Z94.4 S/P LIVER TRANSPLANT: Primary | ICD-10-CM

## 2022-03-16 DIAGNOSIS — Z79.60 LONG-TERM USE OF IMMUNOSUPPRESSANT MEDICATION: ICD-10-CM

## 2022-03-16 DIAGNOSIS — R53.1 WEAKNESS: ICD-10-CM

## 2022-03-16 DIAGNOSIS — R18.8 OTHER ASCITES: ICD-10-CM

## 2022-03-16 DIAGNOSIS — Z91.89 AT RISK FOR OPPORTUNISTIC INFECTIONS: ICD-10-CM

## 2022-03-16 DIAGNOSIS — R53.83 FATIGUE, UNSPECIFIED TYPE: ICD-10-CM

## 2022-03-16 PROBLEM — T38.0X5S ADVERSE EFFECT OF ADRENAL CORTICAL STEROIDS, SEQUELA: Status: RESOLVED | Noted: 2022-01-06 | Resolved: 2022-03-16

## 2022-03-16 PROBLEM — U07.1 COVID-19: Status: RESOLVED | Noted: 2022-02-01 | Resolved: 2022-03-16

## 2022-03-16 PROCEDURE — 99999 PR PBB SHADOW E&M-EST. PATIENT-LVL V: CPT | Mod: PBBFAC,,, | Performed by: INTERNAL MEDICINE

## 2022-03-16 PROCEDURE — 99999 PR PBB SHADOW E&M-EST. PATIENT-LVL V: ICD-10-PCS | Mod: PBBFAC,,, | Performed by: INTERNAL MEDICINE

## 2022-03-16 PROCEDURE — 1160F PR REVIEW ALL MEDS BY PRESCRIBER/CLIN PHARMACIST DOCUMENTED: ICD-10-PCS | Mod: CPTII,S$GLB,, | Performed by: INTERNAL MEDICINE

## 2022-03-16 PROCEDURE — 3078F DIAST BP <80 MM HG: CPT | Mod: CPTII,S$GLB,, | Performed by: INTERNAL MEDICINE

## 2022-03-16 PROCEDURE — 3044F PR MOST RECENT HEMOGLOBIN A1C LEVEL <7.0%: ICD-10-PCS | Mod: CPTII,S$GLB,, | Performed by: INTERNAL MEDICINE

## 2022-03-16 PROCEDURE — 1125F PR PAIN SEVERITY QUANTIFIED, PAIN PRESENT: ICD-10-PCS | Mod: CPTII,S$GLB,, | Performed by: INTERNAL MEDICINE

## 2022-03-16 PROCEDURE — 99215 OFFICE O/P EST HI 40 MIN: CPT | Mod: 24,S$GLB,, | Performed by: INTERNAL MEDICINE

## 2022-03-16 PROCEDURE — 1125F AMNT PAIN NOTED PAIN PRSNT: CPT | Mod: CPTII,S$GLB,, | Performed by: INTERNAL MEDICINE

## 2022-03-16 PROCEDURE — 4010F PR ACE/ARB THEARPY RXD/TAKEN: ICD-10-PCS | Mod: CPTII,S$GLB,, | Performed by: INTERNAL MEDICINE

## 2022-03-16 PROCEDURE — 1101F PR PT FALLS ASSESS DOC 0-1 FALLS W/OUT INJ PAST YR: ICD-10-PCS | Mod: CPTII,S$GLB,, | Performed by: INTERNAL MEDICINE

## 2022-03-16 PROCEDURE — 3008F PR BODY MASS INDEX (BMI) DOCUMENTED: ICD-10-PCS | Mod: CPTII,S$GLB,, | Performed by: INTERNAL MEDICINE

## 2022-03-16 PROCEDURE — 3074F PR MOST RECENT SYSTOLIC BLOOD PRESSURE < 130 MM HG: ICD-10-PCS | Mod: CPTII,S$GLB,, | Performed by: INTERNAL MEDICINE

## 2022-03-16 PROCEDURE — 3288F FALL RISK ASSESSMENT DOCD: CPT | Mod: CPTII,S$GLB,, | Performed by: INTERNAL MEDICINE

## 2022-03-16 PROCEDURE — 3044F HG A1C LEVEL LT 7.0%: CPT | Mod: CPTII,S$GLB,, | Performed by: INTERNAL MEDICINE

## 2022-03-16 PROCEDURE — 99215 PR OFFICE/OUTPT VISIT, EST, LEVL V, 40-54 MIN: ICD-10-PCS | Mod: 24,S$GLB,, | Performed by: INTERNAL MEDICINE

## 2022-03-16 PROCEDURE — 4010F ACE/ARB THERAPY RXD/TAKEN: CPT | Mod: CPTII,S$GLB,, | Performed by: INTERNAL MEDICINE

## 2022-03-16 PROCEDURE — 3288F PR FALLS RISK ASSESSMENT DOCUMENTED: ICD-10-PCS | Mod: CPTII,S$GLB,, | Performed by: INTERNAL MEDICINE

## 2022-03-16 PROCEDURE — 1160F RVW MEDS BY RX/DR IN RCRD: CPT | Mod: CPTII,S$GLB,, | Performed by: INTERNAL MEDICINE

## 2022-03-16 PROCEDURE — 1159F PR MEDICATION LIST DOCUMENTED IN MEDICAL RECORD: ICD-10-PCS | Mod: CPTII,S$GLB,, | Performed by: INTERNAL MEDICINE

## 2022-03-16 PROCEDURE — 3078F PR MOST RECENT DIASTOLIC BLOOD PRESSURE < 80 MM HG: ICD-10-PCS | Mod: CPTII,S$GLB,, | Performed by: INTERNAL MEDICINE

## 2022-03-16 PROCEDURE — 1101F PT FALLS ASSESS-DOCD LE1/YR: CPT | Mod: CPTII,S$GLB,, | Performed by: INTERNAL MEDICINE

## 2022-03-16 PROCEDURE — 1159F MED LIST DOCD IN RCRD: CPT | Mod: CPTII,S$GLB,, | Performed by: INTERNAL MEDICINE

## 2022-03-16 PROCEDURE — 3074F SYST BP LT 130 MM HG: CPT | Mod: CPTII,S$GLB,, | Performed by: INTERNAL MEDICINE

## 2022-03-16 PROCEDURE — 3008F BODY MASS INDEX DOCD: CPT | Mod: CPTII,S$GLB,, | Performed by: INTERNAL MEDICINE

## 2022-03-16 RX ORDER — TACROLIMUS 0.5 MG/1
CAPSULE ORAL
Qty: 90 CAPSULE | Refills: 11 | Status: SHIPPED | OUTPATIENT
Start: 2022-03-16 | End: 2022-04-01

## 2022-03-16 RX ORDER — FUROSEMIDE 40 MG/1
40 TABLET ORAL 2 TIMES DAILY
Qty: 60 TABLET | Refills: 11 | Status: SHIPPED | OUTPATIENT
Start: 2022-03-16 | End: 2022-04-01

## 2022-03-16 NOTE — PATIENT INSTRUCTIONS
Ct scan abdo pelvis  Echo of heart  Lower prograf to 1/0.5  Check cmv  Cxr  Paracentesis  Diuretic-lasix 40 mg daily  Abdo US with doppler  Return 2 weeks

## 2022-03-16 NOTE — LETTER
March 16, 2022        Jessika Carbone  [address]  [facility] [address]  Phone: [phone]  Fax: [phone]             [facility]  [address] [address]  Phone: [phone]  Fax: [phone]   Patient: Tej Purdy   MR Number: [id number]   YOB: 1955   Date of Visit: 3/16/2022       Dear Dr. Jessika Carbone    Thank you for referring Tej Purdy to me for evaluation. Attached you will find relevant portions of my assessment and plan of care.    If you have questions, please do not hesitate to call me. I look forward to following Tej Purdy along with you.    Sincerely,    Thais Maxwell MD    Enclosure    If you would like to receive this communication electronically, please contact [email or web address] or [phone] to request Patient Education Systems Link access.    Patient Education Systems Link is a tool which provides read-only access to select patient information with whom you have a relationship. Its easy to use and provides real time access to review your patients record including encounter summaries, notes, results, and demographic information.    If you feel you have received this communication in error or would no longer like to receive these types of communications, please e-mail [email or web address]

## 2022-03-16 NOTE — Clinical Note
1. Ct scan abdo pelvis 2. Echo of heart 3. Lower prograf to 1/0.5 4. Check cmv 5. Cxr 6. Paracentesis 7. Diuretic-lasix 40 mg daily Return 2 weeks

## 2022-03-16 NOTE — PROGRESS NOTES
Transplant Hepatology  Liver Transplant Recipient Follow-up    Transplant Date: 1/6/2022  UNOS Native Liver Dx: Primary Liver Malignancy: Hepatoma (HCC) and Cirrhosis    Tej is here for follow up of his liver transplant.    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     Subjective:     Interval History: Tej is now 69 days post liver transplant. Currently, he is feeling weak and gets SOB with only walking a few steps. He has gained ~15 lbs despite not really eating. I note that he did have a 4.4 L paracentesis during a recent readmission. He denies N/V or diarrhea. He does have RLQ pain.    --had covid early 01/26/22-received monoclonal antibody    Allograft function 3/14/22: ALT 12, AST 20, ALKP 86, Tbil 0.4, creat 1.0, prograf level 9.2  IS: prograf 1/1 and cellcept 1000/1000  Immunoprophylaxis:  PCP PROPHYLAXIS: Bactrim 7/7/2022  CMV PROPHYLAXIS: valcyte until 7/7/2022  FUNGAL PROPHYLAXISL nystatin  Aspirin: YES/NO(WHY) DOSE: 81 mg   Apixaban for AFIB      Hosp 2/1/22-2/6/22: Mr. Purdy was admitted 2/1 for increasing weakness. Labs on admission revealed a h/h of 5.5/17.9. Has received 2 units of PRBCs as of 2/2, h/h responded appropriately. ASA and elliquis held. CT abd/pelvis was attained to assess for hemorrhage. CT revealed new moderate abdominopelvic ascites that appears slightly complex in the pelvis. Para 2/3 with 4.5L of bloody fluid drained, labs with some concern for SBP, cefepime  started. Occult blood stool negative. Cardiac w/u without acute findings, infectious w/u with BCs NGTD, clean UA. CXR with no acute cardiopulmonary disease. Transitioned to cefpoxodine  on 2/5 for 7 days     Abdo US with doppler 2/10/22: Satisfactory Doppler evaluation of the liver allograft.  Intrahepatic arterial resistive indices have normalized.    Hx cholangiocarcinoma: saw oncology: Given his relatively poor performance status, anemia and the marginal survival benefit of adjuvant chemotherapy even in non-transplant patients, I do not think that the benefits of chemotherapy outweigh the risk for Mr. Purdy. He is in agreement with this plan.  We will instead proceed with surveillance.  I will repeat a CT CAP in 3 months with plan for q3 months imaging for year 1.      Review of Systems   Constitutional: Positive for fatigue.   HENT: Negative.    Eyes: Negative.    Respiratory: Negative.    Cardiovascular: Negative.    Gastrointestinal: Negative.    Genitourinary: Negative.    Musculoskeletal: Negative.    Skin: Negative.    Neurological: Positive for weakness.   Psychiatric/Behavioral: Negative.        Objective:     Vitals:    03/16/22 1514   BP: (!) 116/56   Pulse: 75   Resp: 18   Temp: 98.3 °F (36.8 °C)     Physical Exam  Vitals reviewed.   Constitutional:       Appearance: He is well-developed.   HENT:      Head: Normocephalic and atraumatic.   Eyes:      General: No scleral icterus.     Conjunctiva/sclera: Conjunctivae normal.      Pupils: Pupils are equal, round, and reactive to light.   Neck:      Thyroid: No thyromegaly.   Cardiovascular:      Rate and Rhythm: Normal rate and regular rhythm.      Heart sounds: Normal heart sounds.   Pulmonary:      Effort: Pulmonary effort is normal.      Breath sounds: Normal breath sounds. No rales.   Abdominal:      General: Bowel sounds are normal. There is distension.      Palpations: Abdomen is soft. There is no mass.      Tenderness: There is no abdominal  tenderness.   Musculoskeletal:         General: Normal range of motion.      Cervical back: Normal range of motion and neck supple.   Skin:     General: Skin is warm and dry.      Findings: No rash.   Neurological:      Mental Status: He is alert and oriented to person, place, and time.       Lab Results   Component Value Date    BILITOT 0.4 03/14/2022    AST 20 03/14/2022    ALT 12 03/14/2022    ALKPHOS 86 03/14/2022    CREATININE 1.0 03/14/2022    ALBUMIN 3.6 03/14/2022     Lab Results   Component Value Date    WBC 5.10 03/14/2022    HGB 9.1 (L) 03/14/2022    HCT 31.4 (L) 03/14/2022    HCT 33 (L) 01/06/2022     03/14/2022     Lab Results   Component Value Date    TACROLIMUS 9.2 03/14/2022       Assessment/Plan:   The patient is a 66 year old male who is 69 days post liver transplant. His post transplant course has been complicated by a covid infection and signifcant anemia requiring 2 units of PRBC and ascites. He currently feels weak, fatigued. Despite not eating he has gained 15 lbs and on exam has ascites. He has good allograft and renal function. Hemoglobin is adequate. Current recommendations:  1. S/p liver transplant and control of IS: good allograft function. Will lower prograf to 1/0.5 from 1/1 and target a prograf level of 6-8 until he is 90 days post liver tranpslant; then will lower to 4-6 -will try to minimize IS given hx of cholangiocarcinoma on explant; continue cellcept until 90 days post liver tx -then d/c  2. Immunoprophylaxis:  PCP PROPHYLAXIS: Bactrim 7/7/2022  CMV PROPHYLAXIS: valcyte until 7/7/2022  FUNGAL PROPHYLAXISL nystatin  Aspirin: YES/NO(WHY) DOSE: 81 mg   *Apixaban for AFIB*  3. Cholangiocarcinoma: chemotx deferred; sruveillance imaging and cea/ca19-9 every 3 months  4. FAtigue and weakness with RLQ pain: unclear etiolgoy; recommend CT abdo/pelvis w wo contrast; cxr pa and lat; 2D echo; check cmv pcr;   5. AScites: repeat abdo US with doppler; start lasix 40 mg daily; large vol  paracentesis    Return 2 weeks    Patient advised that it is recommended that all transplant candidates, and their close contacts and household members receive Covid vaccination.      Thais Maxwell MD           OS Patient Status  Functional Status: 70% - Cares for self: unable to carry on normal activity or active work  Physical Capacity: No Limitations    New diabetes onset between last follow-up to the current follow-up: No  Did patient have any acute rejection episodes during the follow-up period: No  Post transplant malignancy: No

## 2022-03-16 NOTE — Clinical Note
1. Ct scan abdo pelvis 2. Echo of heart 3. Lower prograf to 1/0.5 4. Check cmv 5. Cxr 6. Paracentesis 7. Diuretic-lasix 40 mg daily 8. Abdo US with doppler 9. Return 2 weeks

## 2022-03-17 ENCOUNTER — TELEPHONE (OUTPATIENT)
Dept: TRANSPLANT | Facility: CLINIC | Age: 67
End: 2022-03-17
Payer: MEDICARE

## 2022-03-17 ENCOUNTER — PATIENT MESSAGE (OUTPATIENT)
Dept: TRANSPLANT | Facility: CLINIC | Age: 67
End: 2022-03-17
Payer: MEDICARE

## 2022-03-17 DIAGNOSIS — Z94.4 S/P LIVER TRANSPLANT: ICD-10-CM

## 2022-03-17 DIAGNOSIS — R06.02 SOB (SHORTNESS OF BREATH): ICD-10-CM

## 2022-03-17 DIAGNOSIS — R18.8 OTHER ASCITES: Primary | ICD-10-CM

## 2022-03-17 NOTE — TELEPHONE ENCOUNTER
----- Message from Thais Maxwell MD sent at 3/16/2022  8:54 PM CDT -----  1. Ct scan abdo pelvis  2. Echo of heart  3. Lower prograf to 1/0.5  4. Check cmv  5. Cxr  6. Paracentesis  7. Diuretic-lasix 40 mg daily  8. Abdo US with doppler  9. Return 2 weeks

## 2022-03-18 ENCOUNTER — PATIENT MESSAGE (OUTPATIENT)
Dept: TRANSPLANT | Facility: CLINIC | Age: 67
End: 2022-03-18
Payer: MEDICARE

## 2022-03-18 ENCOUNTER — TELEPHONE (OUTPATIENT)
Dept: TRANSPLANT | Facility: CLINIC | Age: 67
End: 2022-03-18
Payer: MEDICARE

## 2022-03-18 ENCOUNTER — HOSPITAL ENCOUNTER (OUTPATIENT)
Dept: RADIOLOGY | Facility: HOSPITAL | Age: 67
Discharge: HOME | End: 2022-03-18
Attending: INTERNAL MEDICINE
Payer: MEDICARE

## 2022-03-18 DIAGNOSIS — R06.02 SOB (SHORTNESS OF BREATH): ICD-10-CM

## 2022-03-18 PROCEDURE — 71046 XR CHEST PA AND LATERAL: ICD-10-PCS | Mod: 26,,, | Performed by: RADIOLOGY

## 2022-03-18 PROCEDURE — 71046 X-RAY EXAM CHEST 2 VIEWS: CPT | Mod: TC,PN

## 2022-03-18 PROCEDURE — 71046 X-RAY EXAM CHEST 2 VIEWS: CPT | Mod: 26,,, | Performed by: RADIOLOGY

## 2022-03-18 NOTE — TELEPHONE ENCOUNTER
Sent message to let patient know below findings.    ----- Message from Thais Maxwell MD sent at 3/18/2022 12:30 PM CDT -----  No findings to account for SOB and weakness

## 2022-03-19 DIAGNOSIS — E11.65 TYPE 2 DIABETES MELLITUS WITH HYPERGLYCEMIA, WITH LONG-TERM CURRENT USE OF INSULIN: ICD-10-CM

## 2022-03-19 DIAGNOSIS — Z79.4 TYPE 2 DIABETES MELLITUS WITH HYPERGLYCEMIA, WITH LONG-TERM CURRENT USE OF INSULIN: ICD-10-CM

## 2022-03-21 ENCOUNTER — LAB VISIT (OUTPATIENT)
Dept: LAB | Facility: HOSPITAL | Age: 67
End: 2022-03-21
Attending: INTERNAL MEDICINE
Payer: MEDICARE

## 2022-03-21 DIAGNOSIS — Z94.4 S/P LIVER TRANSPLANT: ICD-10-CM

## 2022-03-21 DIAGNOSIS — E83.42 HYPOMAGNESEMIA: ICD-10-CM

## 2022-03-21 LAB
ALBUMIN SERPL BCP-MCNC: 3.6 G/DL (ref 3.5–5.2)
ALP SERPL-CCNC: 80 U/L (ref 55–135)
ALT SERPL W/O P-5'-P-CCNC: 10 U/L (ref 10–44)
ANION GAP SERPL CALC-SCNC: 12 MMOL/L (ref 8–16)
AST SERPL-CCNC: 20 U/L (ref 10–40)
BASOPHILS # BLD AUTO: 0.07 K/UL (ref 0–0.2)
BASOPHILS NFR BLD: 1.8 % (ref 0–1.9)
BILIRUB SERPL-MCNC: 0.4 MG/DL (ref 0.1–1)
BUN SERPL-MCNC: 11 MG/DL (ref 8–23)
CALCIUM SERPL-MCNC: 9.3 MG/DL (ref 8.7–10.5)
CHLORIDE SERPL-SCNC: 99 MMOL/L (ref 95–110)
CO2 SERPL-SCNC: 29 MMOL/L (ref 23–29)
CREAT SERPL-MCNC: 1.2 MG/DL (ref 0.5–1.4)
DIFFERENTIAL METHOD: ABNORMAL
EOSINOPHIL # BLD AUTO: 0.1 K/UL (ref 0–0.5)
EOSINOPHIL NFR BLD: 1.5 % (ref 0–8)
ERYTHROCYTE [DISTWIDTH] IN BLOOD BY AUTOMATED COUNT: 15.6 % (ref 11.5–14.5)
EST. GFR  (AFRICAN AMERICAN): >60 ML/MIN/1.73 M^2
EST. GFR  (NON AFRICAN AMERICAN): >60 ML/MIN/1.73 M^2
GLUCOSE SERPL-MCNC: 108 MG/DL (ref 70–110)
HCT VFR BLD AUTO: 31.3 % (ref 40–54)
HGB BLD-MCNC: 9.1 G/DL (ref 14–18)
IMM GRANULOCYTES # BLD AUTO: 0.15 K/UL (ref 0–0.04)
IMM GRANULOCYTES NFR BLD AUTO: 3.8 % (ref 0–0.5)
LYMPHOCYTES # BLD AUTO: 0.6 K/UL (ref 1–4.8)
LYMPHOCYTES NFR BLD: 16 % (ref 18–48)
MAGNESIUM SERPL-MCNC: 1.5 MG/DL (ref 1.6–2.6)
MCH RBC QN AUTO: 24.9 PG (ref 27–31)
MCHC RBC AUTO-ENTMCNC: 29.1 G/DL (ref 32–36)
MCV RBC AUTO: 86 FL (ref 82–98)
MONOCYTES # BLD AUTO: 0.4 K/UL (ref 0.3–1)
MONOCYTES NFR BLD: 10.3 % (ref 4–15)
NEUTROPHILS # BLD AUTO: 2.7 K/UL (ref 1.8–7.7)
NEUTROPHILS NFR BLD: 66.6 % (ref 38–73)
NRBC BLD-RTO: 0 /100 WBC
PLATELET # BLD AUTO: 212 K/UL (ref 150–450)
PMV BLD AUTO: 11.3 FL (ref 9.2–12.9)
POTASSIUM SERPL-SCNC: 4.3 MMOL/L (ref 3.5–5.1)
PROT SERPL-MCNC: 6.7 G/DL (ref 6–8.4)
RBC # BLD AUTO: 3.65 M/UL (ref 4.6–6.2)
SODIUM SERPL-SCNC: 140 MMOL/L (ref 136–145)
WBC # BLD AUTO: 3.99 K/UL (ref 3.9–12.7)

## 2022-03-21 PROCEDURE — 85025 COMPLETE CBC W/AUTO DIFF WBC: CPT | Performed by: INTERNAL MEDICINE

## 2022-03-21 PROCEDURE — 36415 COLL VENOUS BLD VENIPUNCTURE: CPT | Mod: PN | Performed by: INTERNAL MEDICINE

## 2022-03-21 PROCEDURE — 80197 ASSAY OF TACROLIMUS: CPT | Performed by: INTERNAL MEDICINE

## 2022-03-21 PROCEDURE — 80053 COMPREHEN METABOLIC PANEL: CPT | Performed by: INTERNAL MEDICINE

## 2022-03-21 PROCEDURE — 83735 ASSAY OF MAGNESIUM: CPT | Performed by: INTERNAL MEDICINE

## 2022-03-21 RX ORDER — PEN NEEDLE, DIABETIC 30 GX3/16"
NEEDLE, DISPOSABLE MISCELLANEOUS
Qty: 100 EACH | Refills: 3 | Status: SHIPPED | OUTPATIENT
Start: 2022-03-21 | End: 2022-05-17 | Stop reason: SDUPTHER

## 2022-03-22 ENCOUNTER — HOSPITAL ENCOUNTER (OUTPATIENT)
Dept: INTERVENTIONAL RADIOLOGY/VASCULAR | Facility: HOSPITAL | Age: 67
Discharge: HOME | End: 2022-03-22
Attending: INTERNAL MEDICINE
Payer: MEDICARE

## 2022-03-22 VITALS
OXYGEN SATURATION: 95 % | RESPIRATION RATE: 20 BRPM | DIASTOLIC BLOOD PRESSURE: 70 MMHG | SYSTOLIC BLOOD PRESSURE: 142 MMHG | HEART RATE: 72 BPM

## 2022-03-22 DIAGNOSIS — R18.8 OTHER ASCITES: ICD-10-CM

## 2022-03-22 LAB
APPEARANCE FLD: NORMAL
BODY FLD TYPE: NORMAL
COLOR FLD: NORMAL
LYMPHOCYTES NFR FLD MANUAL: 83 %
MESOTHL CELL NFR FLD MANUAL: 1 %
MONOS+MACROS NFR FLD MANUAL: 14 %
NEUTROPHILS NFR FLD MANUAL: 2 %
TACROLIMUS BLD-MCNC: 7.2 NG/ML (ref 5–15)
WBC # FLD: 578 /CU MM

## 2022-03-22 PROCEDURE — 49083 IR PARACENTESIS WITH IMAGING: ICD-10-PCS | Mod: ,,, | Performed by: PHYSICIAN ASSISTANT

## 2022-03-22 PROCEDURE — 49083 ABD PARACENTESIS W/IMAGING: CPT | Mod: ,,, | Performed by: PHYSICIAN ASSISTANT

## 2022-03-22 PROCEDURE — 89051 BODY FLUID CELL COUNT: CPT | Performed by: INTERNAL MEDICINE

## 2022-03-22 PROCEDURE — C1729 CATH, DRAINAGE: HCPCS

## 2022-03-22 PROCEDURE — 49083 ABD PARACENTESIS W/IMAGING: CPT | Performed by: RADIOLOGY

## 2022-03-22 NOTE — SEDATION DOCUMENTATION
Paracentesis complete.2600 mLs peritoneal fluid drained. Pt tolerated well. Dressing to right clean, dry, and intact. . Specimens sent per lab order. Pt discharged

## 2022-03-22 NOTE — PROCEDURES
Radiology Post-Procedure Note    Pre Op Diagnosis: Ascites  Post Op Diagnosis: Same    Procedure: Ultrasound Guided Paracentesis    Procedure performed by: Jes Peñaloza PA-C    Written Informed Consent Obtained: Yes  Specimen Removed: YES cloudy, yellow  Estimated Blood Loss: Minimal    Findings:   Successful paracentesis.  RLQ. Albumin administered PRN per protocol.    Patient tolerated procedure well.    Jes Peñaloza PA-C  Interventional Radiology  Clinic [phone]

## 2022-03-22 NOTE — H&P
Radiology History & Physical      SUBJECTIVE:     Chief Complaint: abdominal distention    History of Present Illness:  Tej Purdy is a 66 y.o. male who presents for ultrasound guided paracentesis  Past Medical History:   Diagnosis Date    Atrial fibrillation     Cholangiocarcinoma 3/16/2022    Cirrhosis 11/23/2020    Diabetes mellitus, type 2     Diabetic neuropathy 11/24/2020    Disorder of kidney and ureter     HTN (hypertension) 11/23/2020    Hyperlipidemia 11/23/2020    Kidney stones 11/23/2020    S/p lithotripsy 2020    Leukocytosis 1/15/2021    Liver mass 11/23/2020    CASTILLO (nonalcoholic steatohepatitis) 11/23/2020    Other ascites 3/16/2022    PAD (peripheral artery disease)     Portal hypertension 11/23/2020    Skin cancer 11/23/2020     Past Surgical History:   Procedure Laterality Date    COLONOSCOPY  10/2020    ESOPHAGOGASTRODUODENOSCOPY  10/2020    ESOPHAGOGASTRODUODENOSCOPY N/A 7/1/2021    Procedure: EGD (ESOPHAGOGASTRODUODENOSCOPY);  Surgeon: Jovan David MD;  Location: [facility] (4TH FLR);  Service: Endoscopy;  Laterality: N/A;  HCC. Listed for liver transplant. EGD for variceal surveillance.  cardiac clearance and blood thinenr approval received, see telephone encounter 6/23/21-BB  fully vaccinated-BB  labs same day of procedure-BB    EXCISION OF HYDROCELE Right     hemorroid surgery      hemorroidectomy      KIDNEY STONE SURGERY      LEFT HEART CATHETERIZATION N/A 1/27/2021    Procedure: Left heart cath;  Surgeon: Kris Shelton MD;  Location: [facility] CATH LAB;  Service: Cardiology;  Laterality: N/A;    liver mass removal      LIVER TRANSPLANT N/A 1/6/2022    Procedure: TRANSPLANT, LIVER;  Surgeon: Lizet Garcia MD;  Location: [facility] OR 2ND FLR;  Service: Transplant;  Laterality: N/A;    RIGHT HEART CATHETERIZATION Right 5/7/2021    Procedure: INSERTION, CATHETER, RIGHT HEART;  Surgeon: Jaden Schuster MD;  Location: [facility] CATH LAB;  Service: Cardiology;   Laterality: Right;    TREATMENT OF CARDIAC ARRHYTHMIA N/A 5/19/2021    Procedure: CARDIOVERSION;  Surgeon: Didier Tilley MD;  Location: [facility] EP LAB;  Service: Cardiology;  Laterality: N/A;  a fib, dccv/johny, mac, dm. sscu    TREATMENT OF CARDIAC ARRHYTHMIA N/A 5/31/2021    Procedure: CARDIOVERSION;  Surgeon: Daniel Arambula MD;  Location: [facility] EP LAB;  Service: Cardiology;  Laterality: N/A;  afib, DCCV, anest., DM, 3 prep    TREATMENT OF CARDIAC ARRHYTHMIA N/A 7/7/2021    Procedure: Cardioversion or Defibrillation;  Surgeon: Didier Tilley MD;  Location: [facility] EP LAB;  Service: Cardiology;  Laterality: N/A;  AF, JOHNY (cancel if complaint), DCCV, MAC, DM, 3 Prep    TREATMENT OF CARDIAC ARRHYTHMIA N/A 7/27/2021    Procedure: Cardioversion or Defibrillation;  Surgeon: Didier Tilley MD;  Location: [facility] EP LAB;  Service: Cardiology;  Laterality: N/A;  AF, JOHNY (cx if complaint), DCCV, MAC, DM, 3 Prep       Home Meds:   Prior to Admission medications    Medication Sig Start Date End Date Taking? Authorizing Provider   amiodarone (PACERONE) 200 MG Tab Take 1 tablet (200 mg total) by mouth once daily. 1/13/22   Werner Zamarripa MD   amLODIPine (NORVASC) 10 MG tablet Take 1 tablet (10 mg total) by mouth once daily. 1/14/22 1/14/23  Werner Zamarripa MD   apixaban (ELIQUIS) 5 mg Tab Take 1 tablet (5 mg total) by mouth 2 (two) times daily. 1/7/22   Leland Munoz MD   aspirin (ECOTRIN) 81 MG EC tablet Take 81 mg by mouth once daily.    Historical Provider   blood sugar diagnostic Strp To check BG 2 times daily, to use with insurance preferred meter (patient has a True[employer] self-monitoring glucose meter) 2/16/22   Carlos Argueta MD   blood-glucose meter kit To check BG 2 times daily, to use with insurance preferred meter 7/16/21   Carlos Argueta MD   calcium carbonate (TUMS) 200 mg calcium (500 mg) chewable tablet Take 1 tablet (500 mg total) by mouth 3 (three) times daily as needed for Heartburn. 1/13/22 1/13/23   Werner Zamarripa MD   calcium carbonate-vitamin D3 600 mg-20 mcg (800 unit) Tab Take 1 tablet by mouth once daily. 1/13/22   Werner Zamarripa MD   furosemide (LASIX) 40 MG tablet Take 1 tablet (40 mg total) by mouth 2 (two) times daily. 3/16/22 3/16/23  Thais Maxwell MD   gabapentin (NEURONTIN) 300 MG capsule Take 1 capsule (300 mg total) by mouth 3 (three) times daily as needed (neuropathy). 1/13/22   Werner Zamarripa MD   insulin (LANTUS SOLOSTAR U-100 INSULIN) glargine 100 units/mL (3mL) SubQ pen Inject 24 Units into the skin every evening. 1/13/22   Werner Zamarripa MD   insulin lispro (HUMALOG KWIKPEN INSULIN) 100 unit/mL pen Inject 6 Units into the skin 3 (three) times daily. Plus sliding scale, MDD: 48 units 1/13/22 1/13/23  Werner Zamarripa MD   lancets Misc To check BG 2 times daily, to use with insurance preferred meter 2/16/22   Carlos Argueta MD   lovastatin (MEVACOR) 20 MG tablet Take 20 mg by mouth once daily. 1/20/22   Historical Provider   magnesium oxide (MAG-OX) 400 mg (241.3 mg magnesium) tablet Take 1 tablet (400 mg total) by mouth 2 (two) times daily. 2/6/22   Melvin Lozada MD   multivitamin capsule Take 1 capsule by mouth once daily.    Historical Provider   mycophenolate (CELLCEPT) 250 mg Cap Take 4 capsules (1,000 mg total) by mouth 2 (two) times daily. Resume on 2/11/22 2/11/22   Melvin Lozada MD   omega-3 fatty acids/fish oil (FISH OIL-OMEGA-3 FATTY ACIDS) 300-1,000 mg capsule Take 1 capsule by mouth once daily.     Historical Provider   ondansetron (ZOFRAN-ODT) 4 MG TbDL Take 1 tablet (4 mg total) by mouth every 8 (eight) hours as needed (nausea). 3/2/22   Genaro Otoole MD   oxyCODONE (ROXICODONE) 10 mg Tab immediate release tablet Take 1 tablet (10 mg total) by mouth every 4 (four) hours as needed for Pain.  Patient not taking: Reported on 3/16/2022 1/13/22   Blanche Shafer PA-C   pantoprazole (PROTONIX) 40 MG tablet Take 1 tablet (40 mg total) by mouth once daily. 1/13/22   Werner Zamarripa MD  "  pen needle, diabetic (BD ULTRA-FINE GÉNESIS PEN NEEDLE) 32 gauge x 5/32" Ndle Use with insulin once daily 3/21/22   Carlos Argueta MD   pulse oximeter (PULSE OXIMETER) device by Apply Externally route 2 (two) times a day. Use twice daily at 8 AM and 3 PM and record the value in MyChart as directed. 2/6/22   GENE Hou   sertraline (ZOLOFT) 25 MG tablet Take 1 tablet (25 mg total) by mouth once daily. 1/13/22   Werner Zamarripa MD   sulfamethoxazole-trimethoprim 400-80mg (BACTRIM,SEPTRA) 400-80 mg per tablet Take 1 tablet by mouth every morning. STOP 7/7/22 1/13/22   Leland Munoz MD   tacrolimus (PROGRAF) 0.5 MG Cap Take 1 mg in the am and 0.5 mg in the pm (total is 1.5 mg per day) 3/16/22   Thais Maxwell MD   tamsulosin (FLOMAX) 0.4 mg Cap Take 1 capsule (0.4 mg total) by mouth every evening. 1/13/22 1/13/23  Werner Zamarripa MD   valGANciclovir (VALCYTE) 450 mg Tab Take 1 tablet (450 mg total) by mouth once daily. STOP 7/7/22 1/16/22 1/16/23  Leland Munoz MD     Anticoagulants/Antiplatelets: aspirin and eliquis    Allergies:   Review of patient's allergies indicates:   Allergen Reactions    Bee pollens Swelling     BEE STINGS swells body     Sedation History:  no adverse reactions    Review of Systems:   Hematological: no known coagulopathies  Respiratory: no shortness of breath  Cardiovascular: no chest pain  Gastrointestinal: no abdominal pain  Genito-Urinary: no dysuria  Musculoskeletal: negative  Neurological: no TIA or stroke symptoms         OBJECTIVE:     Vital Signs (Most Recent)  Pulse: 72 (03/22/22 1016)  Resp: 20 (03/22/22 1016)  BP: (!) 142/70 (03/22/22 1016)  SpO2: 95 % (03/22/22 1016)    Physical Exam:  ASA: 2  Mallampati: n/a    General: no acute distress  Mental Status: alert and oriented to person, place and time  HEENT: normocephalic, atraumatic  Chest: unlabored breathing  Heart: regular heart rate  Abdomen: distended  Extremity: moves all " extremities    ASSESSMENT/PLAN:     Sedation Plan: local  Patient will undergo ultrasound guided paracentesis.    Jes Peñaloza PA-C  Interventional Radiology  Clinic [phone]

## 2022-03-23 LAB
CYTOMEGALOVIRUS DNA: NOT DETECTED
CYTOMEGALOVIRUS LOG (IU/ML): NOT DETECTED LOGIU/ML
CYTOMEGALOVIRUS PCR, QUANT: NOT DETECTED IU/ML

## 2022-03-25 ENCOUNTER — PATIENT MESSAGE (OUTPATIENT)
Dept: TRANSPLANT | Facility: CLINIC | Age: 67
End: 2022-03-25
Payer: MEDICARE

## 2022-03-25 ENCOUNTER — TELEPHONE (OUTPATIENT)
Dept: TRANSPLANT | Facility: CLINIC | Age: 67
End: 2022-03-25
Payer: MEDICARE

## 2022-03-25 ENCOUNTER — HOSPITAL ENCOUNTER (OUTPATIENT)
Dept: CARDIOLOGY | Facility: HOSPITAL | Age: 67
Discharge: HOME | End: 2022-03-25
Attending: INTERNAL MEDICINE
Payer: MEDICARE

## 2022-03-25 VITALS
DIASTOLIC BLOOD PRESSURE: 72 MMHG | SYSTOLIC BLOOD PRESSURE: 124 MMHG | WEIGHT: 235 LBS | HEART RATE: 73 BPM | HEIGHT: 72 IN | BODY MASS INDEX: 31.83 KG/M2

## 2022-03-25 DIAGNOSIS — R18.8 OTHER ASCITES: Primary | ICD-10-CM

## 2022-03-25 DIAGNOSIS — R06.02 SOB (SHORTNESS OF BREATH): ICD-10-CM

## 2022-03-25 LAB
ASCENDING AORTA: 3.41 CM
AV INDEX (PROSTH): 0.23
AV MEAN GRADIENT: 35 MMHG
AV PEAK GRADIENT: 57 MMHG
AV VALVE AREA: 0.97 CM2
AV VELOCITY RATIO: 0.24
BSA FOR ECHO PROCEDURE: 2.33 M2
CV ECHO LV RWT: 0.35 CM
DOP CALC AO PEAK VEL: 3.77 M/S
DOP CALC AO VTI: 93.4 CM
DOP CALC LVOT AREA: 4.2 CM2
DOP CALC LVOT DIAMETER: 2.3 CM
DOP CALC LVOT PEAK VEL: 0.91 M/S
DOP CALC LVOT STROKE VOLUME: 90.98 CM3
DOP CALCLVOT PEAK VEL VTI: 21.91 CM
E WAVE DECELERATION TIME: 215.83 MSEC
E/A RATIO: 0.88
E/E' RATIO: 9.23 M/S
ECHO LV POSTERIOR WALL: 0.91 CM (ref 0.6–1.1)
EJECTION FRACTION: 60 %
FRACTIONAL SHORTENING: 33 % (ref 28–44)
INTERVENTRICULAR SEPTUM: 0.99 CM (ref 0.6–1.1)
IVRT: 97.05 MSEC
LA MAJOR: 5.96 CM
LA MINOR: 6.25 CM
LA WIDTH: 5.19 CM
LEFT ATRIUM SIZE: 4.13 CM
LEFT ATRIUM VOLUME INDEX MOD: 41.6 ML/M2
LEFT ATRIUM VOLUME INDEX: 48.8 ML/M2
LEFT ATRIUM VOLUME MOD: 94.82 CM3
LEFT ATRIUM VOLUME: 111.17 CM3
LEFT INTERNAL DIMENSION IN SYSTOLE: 3.44 CM (ref 2.1–4)
LEFT VENTRICLE DIASTOLIC VOLUME INDEX: 55.75 ML/M2
LEFT VENTRICLE DIASTOLIC VOLUME: 127.11 ML
LEFT VENTRICLE MASS INDEX: 79 G/M2
LEFT VENTRICLE SYSTOLIC VOLUME INDEX: 21.5 ML/M2
LEFT VENTRICLE SYSTOLIC VOLUME: 48.93 ML
LEFT VENTRICULAR INTERNAL DIMENSION IN DIASTOLE: 5.16 CM (ref 3.5–6)
LEFT VENTRICULAR MASS: 179.07 G
LV LATERAL E/E' RATIO: 8.57 M/S
LV SEPTAL E/E' RATIO: 10 M/S
MV A" WAVE DURATION": 7.99 MSEC
MV PEAK A VEL: 0.68 M/S
MV PEAK E VEL: 0.6 M/S
MV STENOSIS PRESSURE HALF TIME: 62.59 MS
MV VALVE AREA P 1/2 METHOD: 3.51 CM2
PISA TR MAX VEL: 3.07 M/S
PULM VEIN S/D RATIO: 1.56
PV PEAK D VEL: 0.27 M/S
PV PEAK S VEL: 0.42 M/S
RA MAJOR: 5.59 CM
RA PRESSURE: 3 MMHG
RA WIDTH: 4.04 CM
RIGHT VENTRICULAR END-DIASTOLIC DIMENSION: 4.41 CM
RV TISSUE DOPPLER FREE WALL SYSTOLIC VELOCITY 1 (APICAL 4 CHAMBER VIEW): 16.94 CM/S
SINUS: 3.2 CM
STJ: 2.71 CM
TDI LATERAL: 0.07 M/S
TDI SEPTAL: 0.06 M/S
TDI: 0.07 M/S
TR MAX PG: 38 MMHG
TRICUSPID ANNULAR PLANE SYSTOLIC EXCURSION: 2.24 CM
TV REST PULMONARY ARTERY PRESSURE: 41 MMHG

## 2022-03-25 PROCEDURE — 93306 TTE W/DOPPLER COMPLETE: CPT

## 2022-03-25 PROCEDURE — 93306 TTE W/DOPPLER COMPLETE: CPT | Mod: 26,,, | Performed by: INTERNAL MEDICINE

## 2022-03-25 PROCEDURE — 93306 ECHO (CUPID ONLY): ICD-10-PCS | Mod: 26,,, | Performed by: INTERNAL MEDICINE

## 2022-03-25 NOTE — TELEPHONE ENCOUNTER
Sent message to patient to let him know    ----- Message from Thais Maxwell MD sent at 3/25/2022 11:52 AM CDT -----  Fluid not infected; not sent for albumin or protein; schedule another paracentesis and check cell count, protien, albumin, cytology; await US with doppler

## 2022-03-28 ENCOUNTER — TELEPHONE (OUTPATIENT)
Dept: TRANSPLANT | Facility: CLINIC | Age: 67
End: 2022-03-28
Payer: MEDICARE

## 2022-03-28 ENCOUNTER — DOCUMENT SCAN (OUTPATIENT)
Dept: HOME HEALTH SERVICES | Facility: HOSPITAL | Age: 67
End: 2022-03-28
Payer: MEDICARE

## 2022-03-28 ENCOUNTER — PATIENT MESSAGE (OUTPATIENT)
Dept: TRANSPLANT | Facility: CLINIC | Age: 67
End: 2022-03-28
Payer: MEDICARE

## 2022-03-28 ENCOUNTER — LAB VISIT (OUTPATIENT)
Dept: LAB | Facility: HOSPITAL | Age: 67
End: 2022-03-28
Attending: INTERNAL MEDICINE
Payer: MEDICARE

## 2022-03-28 DIAGNOSIS — Z94.4 S/P LIVER TRANSPLANT: ICD-10-CM

## 2022-03-28 DIAGNOSIS — R06.02 SOB (SHORTNESS OF BREATH): Primary | ICD-10-CM

## 2022-03-28 DIAGNOSIS — E83.42 HYPOMAGNESEMIA: ICD-10-CM

## 2022-03-28 LAB
ALBUMIN SERPL BCP-MCNC: 3.6 G/DL (ref 3.5–5.2)
ALP SERPL-CCNC: 69 U/L (ref 55–135)
ALT SERPL W/O P-5'-P-CCNC: 8 U/L (ref 10–44)
ANION GAP SERPL CALC-SCNC: 13 MMOL/L (ref 8–16)
AST SERPL-CCNC: 18 U/L (ref 10–40)
BASOPHILS # BLD AUTO: 0.07 K/UL (ref 0–0.2)
BASOPHILS NFR BLD: 2.2 % (ref 0–1.9)
BILIRUB SERPL-MCNC: 0.4 MG/DL (ref 0.1–1)
BUN SERPL-MCNC: 15 MG/DL (ref 8–23)
CALCIUM SERPL-MCNC: 9.4 MG/DL (ref 8.7–10.5)
CHLORIDE SERPL-SCNC: 96 MMOL/L (ref 95–110)
CO2 SERPL-SCNC: 31 MMOL/L (ref 23–29)
CREAT SERPL-MCNC: 1.4 MG/DL (ref 0.5–1.4)
DIFFERENTIAL METHOD: ABNORMAL
EOSINOPHIL # BLD AUTO: 0 K/UL (ref 0–0.5)
EOSINOPHIL NFR BLD: 1.2 % (ref 0–8)
ERYTHROCYTE [DISTWIDTH] IN BLOOD BY AUTOMATED COUNT: 15.7 % (ref 11.5–14.5)
EST. GFR  (AFRICAN AMERICAN): 59.3 ML/MIN/1.73 M^2
EST. GFR  (NON AFRICAN AMERICAN): 51.3 ML/MIN/1.73 M^2
GLUCOSE SERPL-MCNC: 77 MG/DL (ref 70–110)
HCT VFR BLD AUTO: 31.4 % (ref 40–54)
HGB BLD-MCNC: 9.2 G/DL (ref 14–18)
IMM GRANULOCYTES # BLD AUTO: 0.06 K/UL (ref 0–0.04)
IMM GRANULOCYTES NFR BLD AUTO: 1.9 % (ref 0–0.5)
LYMPHOCYTES # BLD AUTO: 0.6 K/UL (ref 1–4.8)
LYMPHOCYTES NFR BLD: 17.6 % (ref 18–48)
MAGNESIUM SERPL-MCNC: 1.4 MG/DL (ref 1.6–2.6)
MCH RBC QN AUTO: 24.8 PG (ref 27–31)
MCHC RBC AUTO-ENTMCNC: 29.3 G/DL (ref 32–36)
MCV RBC AUTO: 85 FL (ref 82–98)
MONOCYTES # BLD AUTO: 0.4 K/UL (ref 0.3–1)
MONOCYTES NFR BLD: 13.3 % (ref 4–15)
NEUTROPHILS # BLD AUTO: 2.1 K/UL (ref 1.8–7.7)
NEUTROPHILS NFR BLD: 63.8 % (ref 38–73)
NRBC BLD-RTO: 0 /100 WBC
PLATELET # BLD AUTO: 210 K/UL (ref 150–450)
PMV BLD AUTO: 11.2 FL (ref 9.2–12.9)
POTASSIUM SERPL-SCNC: 4 MMOL/L (ref 3.5–5.1)
PROT SERPL-MCNC: 6.3 G/DL (ref 6–8.4)
RBC # BLD AUTO: 3.71 M/UL (ref 4.6–6.2)
SODIUM SERPL-SCNC: 140 MMOL/L (ref 136–145)
WBC # BLD AUTO: 3.23 K/UL (ref 3.9–12.7)

## 2022-03-28 PROCEDURE — 83735 ASSAY OF MAGNESIUM: CPT | Performed by: INTERNAL MEDICINE

## 2022-03-28 PROCEDURE — 80197 ASSAY OF TACROLIMUS: CPT | Performed by: INTERNAL MEDICINE

## 2022-03-28 PROCEDURE — 80053 COMPREHEN METABOLIC PANEL: CPT | Performed by: INTERNAL MEDICINE

## 2022-03-28 PROCEDURE — 85025 COMPLETE CBC W/AUTO DIFF WBC: CPT | Performed by: INTERNAL MEDICINE

## 2022-03-28 PROCEDURE — 36415 COLL VENOUS BLD VENIPUNCTURE: CPT | Mod: PN | Performed by: INTERNAL MEDICINE

## 2022-03-28 NOTE — TELEPHONE ENCOUNTER
Sent patient a message to let him know    ----- Message from Thais Maxwell MD sent at 3/27/2022  5:41 PM CDT -----  Please have pt f/u with cardiology re AS and PA pressures. Will need another paracentesis with albumin, protein and cytology analysis

## 2022-03-29 LAB — TACROLIMUS BLD-MCNC: 7.5 NG/ML (ref 5–15)

## 2022-03-30 ENCOUNTER — PATIENT MESSAGE (OUTPATIENT)
Dept: TRANSPLANT | Facility: CLINIC | Age: 67
End: 2022-03-30
Payer: MEDICARE

## 2022-03-30 RX ORDER — ONDANSETRON 4 MG/1
4 TABLET, ORALLY DISINTEGRATING ORAL EVERY 8 HOURS PRN
Qty: 15 TABLET | Refills: 0 | Status: SHIPPED | OUTPATIENT
Start: 2022-03-30 | End: 2022-04-01 | Stop reason: SDUPTHER

## 2022-04-01 ENCOUNTER — OFFICE VISIT (OUTPATIENT)
Dept: TRANSPLANT | Facility: CLINIC | Age: 67
End: 2022-04-01
Payer: MEDICARE

## 2022-04-01 VITALS
HEIGHT: 72 IN | RESPIRATION RATE: 17 BRPM | TEMPERATURE: 98 F | OXYGEN SATURATION: 97 % | SYSTOLIC BLOOD PRESSURE: 108 MMHG | WEIGHT: 225.88 LBS | DIASTOLIC BLOOD PRESSURE: 59 MMHG | BODY MASS INDEX: 30.6 KG/M2 | HEART RATE: 72 BPM

## 2022-04-01 DIAGNOSIS — Z94.4 S/P LIVER TRANSPLANT: ICD-10-CM

## 2022-04-01 DIAGNOSIS — C22.1 CHOLANGIOCARCINOMA: Primary | ICD-10-CM

## 2022-04-01 DIAGNOSIS — R18.8 OTHER ASCITES: ICD-10-CM

## 2022-04-01 DIAGNOSIS — Z79.60 LONG-TERM USE OF IMMUNOSUPPRESSANT MEDICATION: ICD-10-CM

## 2022-04-01 PROCEDURE — 99999 PR PBB SHADOW E&M-EST. PATIENT-LVL V: ICD-10-PCS | Mod: PBBFAC,,, | Performed by: INTERNAL MEDICINE

## 2022-04-01 PROCEDURE — 3074F SYST BP LT 130 MM HG: CPT | Mod: CPTII,S$GLB,, | Performed by: INTERNAL MEDICINE

## 2022-04-01 PROCEDURE — 3288F PR FALLS RISK ASSESSMENT DOCUMENTED: ICD-10-PCS | Mod: CPTII,S$GLB,, | Performed by: INTERNAL MEDICINE

## 2022-04-01 PROCEDURE — 3078F PR MOST RECENT DIASTOLIC BLOOD PRESSURE < 80 MM HG: ICD-10-PCS | Mod: CPTII,S$GLB,, | Performed by: INTERNAL MEDICINE

## 2022-04-01 PROCEDURE — 3044F PR MOST RECENT HEMOGLOBIN A1C LEVEL <7.0%: ICD-10-PCS | Mod: CPTII,S$GLB,, | Performed by: INTERNAL MEDICINE

## 2022-04-01 PROCEDURE — 99214 OFFICE O/P EST MOD 30 MIN: CPT | Mod: 24,S$GLB,, | Performed by: INTERNAL MEDICINE

## 2022-04-01 PROCEDURE — 1125F PR PAIN SEVERITY QUANTIFIED, PAIN PRESENT: ICD-10-PCS | Mod: CPTII,S$GLB,, | Performed by: INTERNAL MEDICINE

## 2022-04-01 PROCEDURE — 1125F AMNT PAIN NOTED PAIN PRSNT: CPT | Mod: CPTII,S$GLB,, | Performed by: INTERNAL MEDICINE

## 2022-04-01 PROCEDURE — 3044F HG A1C LEVEL LT 7.0%: CPT | Mod: CPTII,S$GLB,, | Performed by: INTERNAL MEDICINE

## 2022-04-01 PROCEDURE — 4010F ACE/ARB THERAPY RXD/TAKEN: CPT | Mod: CPTII,S$GLB,, | Performed by: INTERNAL MEDICINE

## 2022-04-01 PROCEDURE — 4010F PR ACE/ARB THEARPY RXD/TAKEN: ICD-10-PCS | Mod: CPTII,S$GLB,, | Performed by: INTERNAL MEDICINE

## 2022-04-01 PROCEDURE — 1101F PR PT FALLS ASSESS DOC 0-1 FALLS W/OUT INJ PAST YR: ICD-10-PCS | Mod: CPTII,S$GLB,, | Performed by: INTERNAL MEDICINE

## 2022-04-01 PROCEDURE — 99214 PR OFFICE/OUTPT VISIT, EST, LEVL IV, 30-39 MIN: ICD-10-PCS | Mod: 24,S$GLB,, | Performed by: INTERNAL MEDICINE

## 2022-04-01 PROCEDURE — 3288F FALL RISK ASSESSMENT DOCD: CPT | Mod: CPTII,S$GLB,, | Performed by: INTERNAL MEDICINE

## 2022-04-01 PROCEDURE — 3078F DIAST BP <80 MM HG: CPT | Mod: CPTII,S$GLB,, | Performed by: INTERNAL MEDICINE

## 2022-04-01 PROCEDURE — 1101F PT FALLS ASSESS-DOCD LE1/YR: CPT | Mod: CPTII,S$GLB,, | Performed by: INTERNAL MEDICINE

## 2022-04-01 PROCEDURE — 3008F PR BODY MASS INDEX (BMI) DOCUMENTED: ICD-10-PCS | Mod: CPTII,S$GLB,, | Performed by: INTERNAL MEDICINE

## 2022-04-01 PROCEDURE — 3008F BODY MASS INDEX DOCD: CPT | Mod: CPTII,S$GLB,, | Performed by: INTERNAL MEDICINE

## 2022-04-01 PROCEDURE — 3074F PR MOST RECENT SYSTOLIC BLOOD PRESSURE < 130 MM HG: ICD-10-PCS | Mod: CPTII,S$GLB,, | Performed by: INTERNAL MEDICINE

## 2022-04-01 PROCEDURE — 99999 PR PBB SHADOW E&M-EST. PATIENT-LVL V: CPT | Mod: PBBFAC,,, | Performed by: INTERNAL MEDICINE

## 2022-04-01 RX ORDER — TACROLIMUS 0.5 MG/1
0.5 CAPSULE ORAL EVERY 12 HOURS
Qty: 60 CAPSULE | Refills: 11 | Status: SHIPPED | OUTPATIENT
Start: 2022-04-01 | End: 2022-04-04

## 2022-04-01 RX ORDER — FUROSEMIDE 40 MG/1
40 TABLET ORAL DAILY
Qty: 30 TABLET | Refills: 11 | Status: SHIPPED | OUTPATIENT
Start: 2022-04-01 | End: 2022-04-05 | Stop reason: SINTOL

## 2022-04-01 RX ORDER — ZOLPIDEM TARTRATE 5 MG/1
5 TABLET ORAL NIGHTLY PRN
Qty: 20 TABLET | Refills: 0 | Status: SHIPPED | OUTPATIENT
Start: 2022-04-01 | End: 2022-06-20 | Stop reason: SDUPTHER

## 2022-04-01 RX ORDER — AMLODIPINE BESYLATE 10 MG/1
5 TABLET ORAL DAILY
Qty: 15 TABLET | Refills: 11 | Status: SHIPPED | OUTPATIENT
Start: 2022-04-01 | End: 2022-04-05 | Stop reason: SINTOL

## 2022-04-01 RX ORDER — ONDANSETRON 4 MG/1
4 TABLET, ORALLY DISINTEGRATING ORAL EVERY 8 HOURS PRN
Qty: 15 TABLET | Refills: 0 | Status: SHIPPED | OUTPATIENT
Start: 2022-04-01 | End: 2022-04-01 | Stop reason: SDUPTHER

## 2022-04-01 RX ORDER — ONDANSETRON 4 MG/1
4 TABLET, ORALLY DISINTEGRATING ORAL EVERY 8 HOURS PRN
Qty: 30 TABLET | Refills: 5 | Status: SHIPPED | OUTPATIENT
Start: 2022-04-01 | End: 2023-03-27 | Stop reason: SDUPTHER

## 2022-04-01 RX ORDER — ZOLPIDEM TARTRATE 5 MG/1
5 TABLET ORAL NIGHTLY PRN
Qty: 20 TABLET | Refills: 0 | Status: SHIPPED | OUTPATIENT
Start: 2022-04-01 | End: 2022-04-01 | Stop reason: SDUPTHER

## 2022-04-01 NOTE — PATIENT INSTRUCTIONS
Decrease norvasc to 5 mg daily from 10 mg daily and hold altogether if top BP number is <120 mm Hg  Decrease lasix to 40 mg in the am from 40 mg twice daily  Lower prograf to 0.5/0.5 from 1/0.5; continue weekly labs  Ct now-chest/abdo/pelvis and CA 19-9  Next week - paracentesis, US and cardiology appts as scheduled  I will see you early the following week- Monday  Ambien sparingly

## 2022-04-01 NOTE — PROGRESS NOTES
Transplant Hepatology  Liver Transplant Recipient Follow-up    Transplant Date: 1/6/2022  UNOS Native Liver Dx: Primary Liver Malignancy: Hepatoma (HCC) and Cirrhosis    Tej is here for follow up of his liver transplant.    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     Subjective:     Interval History: Tej is now almost 3 months post liver transplant. Currently, he continues to feel weak and SOB.    Since his last visit, he has undergone evaluation of ascites:  --Paracentesis 3/22/22: 2.6 L, not infected; not sent for albumin or protein (he did have a 4.4 L paracentesis during a recent readmission); repeat paracentesis scheduled   --US abdo with doppler: scheduled; Abdo US with doppler 2/10/22: Satisfactory Doppler evaluation of the liver allograft.  Intrahepatic arterial resistive indices have normalized  --2D echo 3/25/22: EF 60%; mild RV enlargement with normal RVSP; grade II diastolic dysfunction; severe atrial enlargement; mod-severe aortic stenosis; PAP 41 mm Hg; normal central venous pressure (3 mmHg)  --lasix: 40 mg bid; down 10 lbs    --had covid early 01/26/22-received monoclonal antibody    Allograft function 3/28/22: ALT 8, AST 18, ALKP 69, Tbil 0.4, creat 1.4, prograf level 7.5  IS: prograf 1/1 and cellcept 1000/1000  Immunoprophylaxis:  PCP PROPHYLAXIS: Bactrim 7/7/2022  CMV PROPHYLAXIS: valcyte until 7/7/2022  FUNGAL PROPHYLAXISL nystatin completed  Aspirin: YES/NO(WHY) DOSE: 81 mg    Apixaban for AFIB      Hosp 2/1/22-2/6/22: Mr. Purdy was admitted 2/1 for increasing weakness. Labs on admission revealed a h/h of 5.5/17.9. Has received 2 units of PRBCs as of 2/2, h/h responded appropriately. ASA and elliquis held. CT abd/pelvis was attained to assess for hemorrhage. CT revealed new moderate abdominopelvic ascites that appears slightly complex in the pelvis. Para 2/3 with 4.5L of bloody fluid drained, labs with some concern for SBP, cefepime started. Occult blood stool negative. Cardiac w/u without acute findings, infectious w/u with BCs NGTD, clean UA. CXR with no acute cardiopulmonary disease. Transitioned to cefpoxodine  on 2/5 for 7 days     Hx cholangiocarcinoma: saw oncology: Given his relatively poor performance status, anemia and the marginal survival benefit of adjuvant chemotherapy even in non-transplant patients, I do not think that the benefits of chemotherapy outweigh the risk for Mr. Purdy. He is in agreement with this plan.  We will instead proceed with surveillance.  I will repeat a CT CAP in 3 months with plan for q3 months imaging for year 1.      Review of Systems   Constitutional: Positive for fatigue.   HENT: Negative.    Eyes: Negative.    Respiratory: Negative.    Cardiovascular: Negative.    Gastrointestinal: Negative.    Genitourinary: Negative.    Musculoskeletal: Negative.    Skin: Negative.    Neurological: Positive for weakness.   Psychiatric/Behavioral: Negative.        Objective:     Vitals:    04/01/22 1044   BP: (!) 108/59   Pulse: 72   Resp: 17   Temp: 98.4 °F (36.9 °C)     Physical Exam  Vitals reviewed.   Constitutional:       Appearance: He is well-developed.   HENT:      Head: Normocephalic and atraumatic.   Eyes:      General: No scleral icterus.     Conjunctiva/sclera: Conjunctivae normal.      Pupils: Pupils are equal, round, and reactive to light.   Neck:      Thyroid: No thyromegaly.   Cardiovascular:      Rate and Rhythm: Normal rate and regular  rhythm.      Heart sounds: Normal heart sounds.   Pulmonary:      Effort: Pulmonary effort is normal.      Breath sounds: Normal breath sounds. No rales.   Abdominal:      General: Bowel sounds are normal. There is distension.      Palpations: Abdomen is soft. There is no mass.      Tenderness: There is no abdominal tenderness.   Musculoskeletal:         General: Normal range of motion.      Cervical back: Normal range of motion and neck supple.   Skin:     General: Skin is warm and dry.      Findings: No rash.   Neurological:      Mental Status: He is alert and oriented to person, place, and time.       Lab Results   Component Value Date    BILITOT 0.4 03/28/2022    AST 18 03/28/2022    ALT 8 (L) 03/28/2022    ALKPHOS 69 03/28/2022    CREATININE 1.4 03/28/2022    ALBUMIN 3.6 03/28/2022     Lab Results   Component Value Date    WBC 3.23 (L) 03/28/2022    HGB 9.2 (L) 03/28/2022    HCT 31.4 (L) 03/28/2022    HCT 33 (L) 01/06/2022     03/28/2022     Lab Results   Component Value Date    TACROLIMUS 7.5 03/28/2022       Assessment/Plan:   The patient is a 66 year old male who is now almost 3 months post liver transplant. His post transplant course has been complicated by a covid infection and signifcant anemia requiring 2 units of PRBC and ascites. With paracentesis and lasix he has lost 10 lbs. W/u for ascites etiology not yet completed. He has good allograft and renal function. Hemoglobin is adequate. Current recommendations:  1. S/p liver transplant and control of IS: good allograft function; lower prograf to 0.5/0.5 from 1/0.5; continue cellcept; continue weeklh labs  2. Immunoprophylaxis:  PCP PROPHYLAXIS: Bactrim 7/7/2022  CMV PROPHYLAXIS: valcyte until 7/7/2022  FUNGAL PROPHYLAXISL nystatin  Aspirin: YES/NO(WHY) DOSE: 81 mg   *Apixaban for AFIB*  3. Cholangiocarcinoma: chemotx deferred; surveillance imaging and cea/ca19-9 every 3 months  4. FAtigue and weakness with RLQ pain: unclear etiolgoy; recommend CT  abdo/pelvis w wo contrast; cxr pa and lat; 2D echo; check cmv pcr;   5. AScites: repeat abdo US with doppler; lower lasix to 40 mg daily; large vol paracentesis-repeat  6. HTN: decrease nrovasc to 5 mg from 10 mg daily    Return 10 dyas    Patient advised that it is recommended that all transplant candidates, and their close contacts and household members receive Covid vaccination.      Thais Maxwell MD           OS Patient Status  Functional Status: 70% - Cares for self: unable to carry on normal activity or active work  Physical Capacity: No Limitations    New diabetes onset between last follow-up to the current follow-up: No  Did patient have any acute rejection episodes during the follow-up period: No  Post transplant malignancy: No

## 2022-04-01 NOTE — LETTER
April 16, 2022        Jessika Carbone  [address]  [facility] [address]  Phone: [phone]  Fax: [phone]             [facility]  [address] [address]  Phone: [phone]  Fax: [phone]   Patient: Tej Purdy   MR Number: [id number]   YOB: 1955   Date of Visit: 4/1/2022       Dear Dr. Jessika Carbone    Thank you for referring Tej Purdy to me for evaluation. Attached you will find relevant portions of my assessment and plan of care.    If you have questions, please do not hesitate to call me. I look forward to following Tej Purdy along with you.    Sincerely,    Thais Maxwell MD    Enclosure    If you would like to receive this communication electronically, please contact [email or web address] or [phone] to request [facility] Link access.    [facility] is a tool which provides read-only access to select patient information with whom you have a relationship. Its easy to use and provides real time access to review your patients record including encounter summaries, notes, results, and demographic information.    If you feel you have received this communication in error or would no longer like to receive these types of communications, please e-mail [email or web address]

## 2022-04-04 ENCOUNTER — PATIENT MESSAGE (OUTPATIENT)
Dept: TRANSPLANT | Facility: CLINIC | Age: 67
End: 2022-04-04
Payer: MEDICARE

## 2022-04-04 ENCOUNTER — TELEPHONE (OUTPATIENT)
Dept: TRANSPLANT | Facility: CLINIC | Age: 67
End: 2022-04-04
Payer: MEDICARE

## 2022-04-04 ENCOUNTER — HOSPITAL ENCOUNTER (OUTPATIENT)
Dept: RADIOLOGY | Facility: HOSPITAL | Age: 67
Discharge: HOME | End: 2022-04-04
Attending: INTERNAL MEDICINE
Payer: MEDICARE

## 2022-04-04 DIAGNOSIS — Z94.4 S/P LIVER TRANSPLANT: ICD-10-CM

## 2022-04-04 PROCEDURE — 76705 ECHO EXAM OF ABDOMEN: CPT | Mod: 59,TC

## 2022-04-04 PROCEDURE — 93976 US DOPPLER LIVER TRANSPLANT POST (XPD): ICD-10-PCS | Mod: 26,,, | Performed by: INTERNAL MEDICINE

## 2022-04-04 PROCEDURE — 76705 US DOPPLER LIVER TRANSPLANT POST (XPD): ICD-10-PCS | Mod: 26,XS,, | Performed by: INTERNAL MEDICINE

## 2022-04-04 PROCEDURE — 93976 VASCULAR STUDY: CPT | Mod: 26,,, | Performed by: INTERNAL MEDICINE

## 2022-04-04 PROCEDURE — 76705 ECHO EXAM OF ABDOMEN: CPT | Mod: 26,XS,, | Performed by: INTERNAL MEDICINE

## 2022-04-04 PROCEDURE — 93976 VASCULAR STUDY: CPT | Mod: TC

## 2022-04-04 RX ORDER — TACROLIMUS 0.5 MG/1
0.5 CAPSULE ORAL EVERY 12 HOURS
Qty: 60 CAPSULE | Refills: 11 | Status: SHIPPED | OUTPATIENT
Start: 2022-04-04 | End: 2022-09-01

## 2022-04-04 NOTE — TELEPHONE ENCOUNTER
----- Message from Thais Maxwell MD sent at 4/4/2022  9:50 AM CDT -----  The Labs are stable - I lowered his porgraf a bit last Friday- he was tremulous and overall still does not feel well

## 2022-04-04 NOTE — TELEPHONE ENCOUNTER
Sent message to let patient know US ok    ----- Message from Thais Maxwell MD sent at 4/4/2022  4:06 PM CDT -----  Satisfactory doppler

## 2022-04-05 ENCOUNTER — OFFICE VISIT (OUTPATIENT)
Dept: CARDIOLOGY | Facility: CLINIC | Age: 67
End: 2022-04-05
Payer: MEDICARE

## 2022-04-05 ENCOUNTER — PATIENT MESSAGE (OUTPATIENT)
Dept: TRANSPLANT | Facility: CLINIC | Age: 67
End: 2022-04-05
Payer: MEDICARE

## 2022-04-05 ENCOUNTER — HOSPITAL ENCOUNTER (OUTPATIENT)
Dept: INTERVENTIONAL RADIOLOGY/VASCULAR | Facility: HOSPITAL | Age: 67
Discharge: HOME | End: 2022-04-05
Attending: INTERNAL MEDICINE
Payer: MEDICARE

## 2022-04-05 VITALS
HEART RATE: 82 BPM | OXYGEN SATURATION: 94 % | RESPIRATION RATE: 18 BRPM | DIASTOLIC BLOOD PRESSURE: 59 MMHG | SYSTOLIC BLOOD PRESSURE: 123 MMHG

## 2022-04-05 VITALS
OXYGEN SATURATION: 92 % | WEIGHT: 223.13 LBS | HEART RATE: 69 BPM | BODY MASS INDEX: 30.22 KG/M2 | SYSTOLIC BLOOD PRESSURE: 120 MMHG | DIASTOLIC BLOOD PRESSURE: 57 MMHG | HEIGHT: 72 IN

## 2022-04-05 DIAGNOSIS — I51.89 DIASTOLIC DYSFUNCTION: ICD-10-CM

## 2022-04-05 DIAGNOSIS — C22.0 HCC (HEPATOCELLULAR CARCINOMA): ICD-10-CM

## 2022-04-05 DIAGNOSIS — Z94.4 HISTORY OF LIVER TRANSPLANT: ICD-10-CM

## 2022-04-05 DIAGNOSIS — E83.42 HYPOMAGNESEMIA: Primary | ICD-10-CM

## 2022-04-05 DIAGNOSIS — D64.9 ANEMIA, UNSPECIFIED TYPE: ICD-10-CM

## 2022-04-05 DIAGNOSIS — R18.8 OTHER ASCITES: ICD-10-CM

## 2022-04-05 DIAGNOSIS — Z94.4 S/P LIVER TRANSPLANT: Primary | ICD-10-CM

## 2022-04-05 DIAGNOSIS — E11.9 TYPE 2 DIABETES MELLITUS WITHOUT COMPLICATION, UNSPECIFIED WHETHER LONG TERM INSULIN USE: ICD-10-CM

## 2022-04-05 DIAGNOSIS — R06.02 SOB (SHORTNESS OF BREATH): Primary | ICD-10-CM

## 2022-04-05 DIAGNOSIS — I35.0 NONRHEUMATIC AORTIC VALVE STENOSIS: ICD-10-CM

## 2022-04-05 LAB
ALBUMIN FLD-MCNC: 2.3 G/DL
APPEARANCE FLD: NORMAL
BODY FLD TYPE: NORMAL
COLOR FLD: YELLOW
EOSINOPHIL NFR FLD MANUAL: 1 %
LYMPHOCYTES NFR FLD MANUAL: 87 %
MESOTHL CELL NFR FLD MANUAL: 1 %
MONOS+MACROS NFR FLD MANUAL: 9 %
NEUTROPHILS NFR FLD MANUAL: 2 %
PROT FLD-MCNC: 3.2 G/DL
SPECIMEN SOURCE: NORMAL
SPECIMEN SOURCE: NORMAL
WBC # FLD: 485 /CU MM

## 2022-04-05 PROCEDURE — 49083 ABD PARACENTESIS W/IMAGING: CPT | Mod: ,,, | Performed by: PHYSICIAN ASSISTANT

## 2022-04-05 PROCEDURE — C1729 CATH, DRAINAGE: HCPCS

## 2022-04-05 PROCEDURE — 1101F PR PT FALLS ASSESS DOC 0-1 FALLS W/OUT INJ PAST YR: ICD-10-PCS | Mod: CPTII,S$GLB,, | Performed by: INTERNAL MEDICINE

## 2022-04-05 PROCEDURE — 25000003 PHARM REV CODE 250: Performed by: PHYSICIAN ASSISTANT

## 2022-04-05 PROCEDURE — 3008F PR BODY MASS INDEX (BMI) DOCUMENTED: ICD-10-PCS | Mod: CPTII,S$GLB,, | Performed by: INTERNAL MEDICINE

## 2022-04-05 PROCEDURE — 1101F PT FALLS ASSESS-DOCD LE1/YR: CPT | Mod: CPTII,S$GLB,, | Performed by: INTERNAL MEDICINE

## 2022-04-05 PROCEDURE — 82042 OTHER SOURCE ALBUMIN QUAN EA: CPT | Performed by: INTERNAL MEDICINE

## 2022-04-05 PROCEDURE — 4010F ACE/ARB THERAPY RXD/TAKEN: CPT | Mod: CPTII,S$GLB,, | Performed by: INTERNAL MEDICINE

## 2022-04-05 PROCEDURE — 3074F SYST BP LT 130 MM HG: CPT | Mod: CPTII,S$GLB,, | Performed by: INTERNAL MEDICINE

## 2022-04-05 PROCEDURE — 1126F PR PAIN SEVERITY QUANTIFIED, NO PAIN PRESENT: ICD-10-PCS | Mod: CPTII,S$GLB,, | Performed by: INTERNAL MEDICINE

## 2022-04-05 PROCEDURE — 3078F PR MOST RECENT DIASTOLIC BLOOD PRESSURE < 80 MM HG: ICD-10-PCS | Mod: CPTII,S$GLB,, | Performed by: INTERNAL MEDICINE

## 2022-04-05 PROCEDURE — 84157 ASSAY OF PROTEIN OTHER: CPT | Performed by: INTERNAL MEDICINE

## 2022-04-05 PROCEDURE — 49083 ABD PARACENTESIS W/IMAGING: CPT | Performed by: RADIOLOGY

## 2022-04-05 PROCEDURE — 49083 IR PARACENTESIS WITH IMAGING: ICD-10-PCS | Mod: ,,, | Performed by: PHYSICIAN ASSISTANT

## 2022-04-05 PROCEDURE — 3044F HG A1C LEVEL LT 7.0%: CPT | Mod: CPTII,S$GLB,, | Performed by: INTERNAL MEDICINE

## 2022-04-05 PROCEDURE — 3044F PR MOST RECENT HEMOGLOBIN A1C LEVEL <7.0%: ICD-10-PCS | Mod: CPTII,S$GLB,, | Performed by: INTERNAL MEDICINE

## 2022-04-05 PROCEDURE — 3074F PR MOST RECENT SYSTOLIC BLOOD PRESSURE < 130 MM HG: ICD-10-PCS | Mod: CPTII,S$GLB,, | Performed by: INTERNAL MEDICINE

## 2022-04-05 PROCEDURE — 1159F PR MEDICATION LIST DOCUMENTED IN MEDICAL RECORD: ICD-10-PCS | Mod: CPTII,S$GLB,, | Performed by: INTERNAL MEDICINE

## 2022-04-05 PROCEDURE — 3008F BODY MASS INDEX DOCD: CPT | Mod: CPTII,S$GLB,, | Performed by: INTERNAL MEDICINE

## 2022-04-05 PROCEDURE — 89051 BODY FLUID CELL COUNT: CPT | Performed by: INTERNAL MEDICINE

## 2022-04-05 PROCEDURE — 87070 CULTURE OTHR SPECIMN AEROBIC: CPT | Performed by: INTERNAL MEDICINE

## 2022-04-05 PROCEDURE — 4010F PR ACE/ARB THEARPY RXD/TAKEN: ICD-10-PCS | Mod: CPTII,S$GLB,, | Performed by: INTERNAL MEDICINE

## 2022-04-05 PROCEDURE — 99999 PR PBB SHADOW E&M-EST. PATIENT-LVL V: CPT | Mod: PBBFAC,,, | Performed by: INTERNAL MEDICINE

## 2022-04-05 PROCEDURE — 3288F PR FALLS RISK ASSESSMENT DOCUMENTED: ICD-10-PCS | Mod: CPTII,S$GLB,, | Performed by: INTERNAL MEDICINE

## 2022-04-05 PROCEDURE — 99214 OFFICE O/P EST MOD 30 MIN: CPT | Mod: S$GLB,,, | Performed by: INTERNAL MEDICINE

## 2022-04-05 PROCEDURE — 1159F MED LIST DOCD IN RCRD: CPT | Mod: CPTII,S$GLB,, | Performed by: INTERNAL MEDICINE

## 2022-04-05 PROCEDURE — 99214 PR OFFICE/OUTPT VISIT, EST, LEVL IV, 30-39 MIN: ICD-10-PCS | Mod: S$GLB,,, | Performed by: INTERNAL MEDICINE

## 2022-04-05 PROCEDURE — 3288F FALL RISK ASSESSMENT DOCD: CPT | Mod: CPTII,S$GLB,, | Performed by: INTERNAL MEDICINE

## 2022-04-05 PROCEDURE — 3078F DIAST BP <80 MM HG: CPT | Mod: CPTII,S$GLB,, | Performed by: INTERNAL MEDICINE

## 2022-04-05 PROCEDURE — 1126F AMNT PAIN NOTED NONE PRSNT: CPT | Mod: CPTII,S$GLB,, | Performed by: INTERNAL MEDICINE

## 2022-04-05 PROCEDURE — 87075 CULTR BACTERIA EXCEPT BLOOD: CPT | Performed by: INTERNAL MEDICINE

## 2022-04-05 PROCEDURE — 99999 PR PBB SHADOW E&M-EST. PATIENT-LVL V: ICD-10-PCS | Mod: PBBFAC,,, | Performed by: INTERNAL MEDICINE

## 2022-04-05 RX ORDER — LIDOCAINE HYDROCHLORIDE 20 MG/ML
INJECTION, SOLUTION INFILTRATION; PERINEURAL CODE/TRAUMA/SEDATION MEDICATION
Status: COMPLETED | OUTPATIENT
Start: 2022-04-05 | End: 2022-04-05

## 2022-04-05 RX ORDER — LANOLIN ALCOHOL/MO/W.PET/CERES
400 CREAM (GRAM) TOPICAL 3 TIMES DAILY
Qty: 90 TABLET | Refills: 6 | Status: SHIPPED | OUTPATIENT
Start: 2022-04-05 | End: 2022-06-15

## 2022-04-05 RX ADMIN — LIDOCAINE HYDROCHLORIDE 5 ML: 20 INJECTION, SOLUTION INFILTRATION; PERINEURAL at 11:04

## 2022-04-05 NOTE — PROGRESS NOTES
Subjective    Patient ID:  Tej Purdy is a 66 y.o. male who presents for follow-up of paroxsymal atrial fibrillation    HPI   Mr. Purdy is a 66 year old  male with hypertension, hyperlipidemia, type 2 diabetes, symptomatic PAD, aortic stenosis, hepatocellular carcinoma, post TACE/RFA [ 12/20] was initially seen 6/8/21 . At the time he had new onset atrial fibrillation and was post DCCV  5/19/21 and 5/31/21 . He had an admit to [facility] 5/24/21 with acute heart failure. He was followed by Dr Tilley in  on flecainide but had recurrence of AF and underwent a DCCV 7/7/21 following loading with amiodarone. A PVI was discussed at the time. He is now post liver transplant 1/6/22 and hospitalized early February with COVID and anemia that required transfusion. . He present today for evaluation. He has had ascites and paracentesis [bloody ?] 2 weeks ago and his weakness and SAMSON got worse. Also his AS appears to have progressed since last Echo September 2021    Summary 3/25/22    · The left ventricle is normal in size with normal systolic function. The estimated ejection fraction is 60%.  · Mild right ventricular enlargement with normal right ventricular systolic function.  · Grade II left ventricular diastolic dysfunction.  · Severe left atrial enlargement.  · There is moderate-to-severe aortic valve stenosis. Aortic valve area is 0.97 cm2; peak velocity is 3.8 m/s; mean gradient is 35 mmHg.  · The estimated PA systolic pressure is 41 mmHg.  · Normal central venous pressure (3 mmHg).     Summary9/7/21    · The left ventricle is normal in size with normal systolic function. The estimated ejection fraction is 60%.  · Normal left ventricular diastolic function.  · Normal right ventricular size with normal right ventricular systolic function.  · Aortic valve area is 1.10 cm2; peak velocity is 3.17 m/s; mean gradient is 25 mmHg.  · There is mild aortic valve stenosis.  · Mild tricuspid regurgitation.  · The  estimated PA systolic pressure is 32 mmHg.  · Normal central venous pressure (3 mmHg).         Lab Results   Component Value Date     04/04/2022    K 3.9 04/04/2022    CL 98 04/04/2022    CO2 30 (H) 04/04/2022    BUN 11 04/04/2022    CREATININE 1.2 04/04/2022    GLU 82 04/04/2022    HGBA1C 5.8 (H) 01/05/2022    MG 1.4 (L) 04/04/2022    AST 18 04/04/2022    ALT 8 (L) 04/04/2022    ALBUMIN 3.4 (L) 04/04/2022    PROT 6.3 04/04/2022    BILITOT 0.4 04/04/2022    WBC 3.27 (L) 04/04/2022    HGB 8.8 (L) 04/04/2022    HCT 29.7 (L) 04/04/2022    HCT 33 (L) 01/06/2022    MCV 80 (L) 04/04/2022     04/04/2022    INR 1.2 02/01/2022    PSA 0.48 02/25/2021    TSH 1.418 02/04/2022         Lab Results   Component Value Date    CHOL 133 06/29/2021    HDL 32 (L) 06/29/2021    TRIG 159 (H) 06/29/2021       Lab Results   Component Value Date    LDLCALC 69.2 06/29/2021       Past Medical History:   Diagnosis Date    Atrial fibrillation     Cholangiocarcinoma 3/16/2022    Cirrhosis 11/23/2020    Diabetes mellitus, type 2     Diabetic neuropathy 11/24/2020    Disorder of kidney and ureter     HTN (hypertension) 11/23/2020    Hyperlipidemia 11/23/2020    Kidney stones 11/23/2020    S/p lithotripsy 2020    Leukocytosis 1/15/2021    Liver mass 11/23/2020    CASTILLO (nonalcoholic steatohepatitis) 11/23/2020    Other ascites 3/16/2022    PAD (peripheral artery disease)     Portal hypertension 11/23/2020    Skin cancer 11/23/2020       Current Outpatient Medications:     amiodarone (PACERONE) 200 MG Tab, Take 1 tablet (200 mg total) by mouth once daily., Disp: 30 tablet, Rfl: 11    apixaban (ELIQUIS) 5 mg Tab, Take 1 tablet (5 mg total) by mouth 2 (two) times daily., Disp: 60 tablet, Rfl: 11    aspirin (ECOTRIN) 81 MG EC tablet, Take 81 mg by mouth once daily., Disp: , Rfl:     blood sugar diagnostic Strp, To check BG 2 times daily, to use with insurance preferred meter (patient has a [employer] self-monitoring  glucose meter), Disp: 100 strip, Rfl: 11    blood-glucose meter kit, To check BG 2 times daily, to use with insurance preferred meter, Disp: 1 each, Rfl: 0    calcium carbonate (TUMS) 200 mg calcium (500 mg) chewable tablet, Take 1 tablet (500 mg total) by mouth 3 (three) times daily as needed for Heartburn., Disp: 90 tablet, Rfl: 5    calcium carbonate-vitamin D3 600 mg-20 mcg (800 unit) Tab, Take 1 tablet by mouth once daily., Disp: 30 tablet, Rfl: 5    gabapentin (NEURONTIN) 300 MG capsule, Take 1 capsule (300 mg total) by mouth 3 (three) times daily as needed (neuropathy)., Disp: 90 capsule, Rfl: 5    insulin (LANTUS SOLOSTAR U-100 INSULIN) glargine 100 units/mL (3mL) SubQ pen, Inject 24 Units into the skin every evening., Disp: 9 mL, Rfl: 11    insulin lispro (HUMALOG KWIKPEN INSULIN) 100 unit/mL pen, Inject 6 Units into the skin 3 (three) times daily. Plus sliding scale, MDD: 48 units, Disp: 15 mL, Rfl: 4    lancets Misc, To check BG 2 times daily, to use with insurance preferred meter, Disp: 100 each, Rfl: 11    lovastatin (MEVACOR) 20 MG tablet, Take 20 mg by mouth once daily., Disp: , Rfl:     magnesium oxide (MAG-OX) 400 mg (241.3 mg magnesium) tablet, Take 1 tablet (400 mg total) by mouth 2 (two) times daily., Disp: 60 tablet, Rfl: 5    multivitamin capsule, Take 1 capsule by mouth once daily., Disp: , Rfl:     mycophenolate (CELLCEPT) 250 mg Cap, Take 4 capsules (1,000 mg total) by mouth 2 (two) times daily. Resume on 2/11/22, Disp: 240 capsule, Rfl: 2    omega-3 fatty acids/fish oil (FISH OIL-OMEGA-3 FATTY ACIDS) 300-1,000 mg capsule, Take 1 capsule by mouth once daily. , Disp: , Rfl:     ondansetron (ZOFRAN-ODT) 4 MG TbDL, Take 1 tablet (4 mg total) by mouth every 8 (eight) hours as needed (nausea)., Disp: 30 tablet, Rfl: 5    pantoprazole (PROTONIX) 40 MG tablet, Take 1 tablet (40 mg total) by mouth once daily., Disp: 30 tablet, Rfl: 5    pen needle, diabetic (BD ULTRA-FINE GÉNESIS PEN  "NEEDLE) 32 gauge x 5/32" Ndle, Use with insulin once daily, Disp: 100 each, Rfl: 3    pulse oximeter (PULSE OXIMETER) device, by Apply Externally route 2 (two) times a day. Use twice daily at 8 AM and 3 PM and record the value in MyChart as directed., Disp: 1 each, Rfl: 0    sertraline (ZOLOFT) 25 MG tablet, Take 1 tablet (25 mg total) by mouth once daily., Disp: 30 tablet, Rfl: 5    sulfamethoxazole-trimethoprim 400-80mg (BACTRIM,SEPTRA) 400-80 mg per tablet, Take 1 tablet by mouth every morning. STOP 7/7/22, Disp: 30 tablet, Rfl: 5    tacrolimus (PROGRAF) 0.5 MG Cap, Take 1 capsule (0.5 mg total) by mouth every 12 (twelve) hours., Disp: 60 capsule, Rfl: 11    tamsulosin (FLOMAX) 0.4 mg Cap, Take 1 capsule (0.4 mg total) by mouth every evening., Disp: 30 capsule, Rfl: 1    valGANciclovir (VALCYTE) 450 mg Tab, Take 1 tablet (450 mg total) by mouth once daily. STOP 7/7/22, Disp: 30 tablet, Rfl: 5    zolpidem (AMBIEN) 5 MG Tab, Take 1 tablet (5 mg total) by mouth nightly as needed (insomnia)., Disp: 20 tablet, Rfl: 0  No current facility-administered medications for this visit.    Facility-Administered Medications Ordered in Other Visits:     sodium chloride 0.9% bolus 1,000 mL, 1,000 mL, Intravenous, Once, Solange Bill NP          Review of Systems   Constitutional: Positive for decreased appetite and malaise/fatigue. Negative for diaphoresis, fever, weight gain and weight loss.   HENT: Negative for congestion, ear discharge, ear pain and nosebleeds.    Eyes: Negative for blurred vision, double vision and visual disturbance.   Cardiovascular: Positive for dyspnea on exertion. Negative for chest pain, claudication, cyanosis, irregular heartbeat, leg swelling, near-syncope, orthopnea, palpitations, paroxysmal nocturnal dyspnea and syncope.   Respiratory: Positive for shortness of breath. Negative for cough, hemoptysis, sleep disturbances due to breathing, snoring, sputum production and wheezing.  "   Endocrine: Negative for polydipsia, polyphagia and polyuria.   Hematologic/Lymphatic: Negative for adenopathy and bleeding problem. Does not bruise/bleed easily.   Skin: Negative for color change, nail changes, poor wound healing and rash.   Musculoskeletal: Negative for muscle cramps and muscle weakness.   Gastrointestinal: Positive for nausea. Negative for abdominal pain, anorexia, change in bowel habit, hematochezia and vomiting.   Genitourinary: Negative for dysuria, frequency and hematuria.   Neurological: Negative for brief paralysis, difficulty with concentration, excessive daytime sleepiness, dizziness, focal weakness, headaches, light-headedness, seizures, vertigo and weakness.   Psychiatric/Behavioral: Negative for altered mental status and depression.   Allergic/Immunologic: Negative for persistent infections.        Objective:BP (!) 120/57 (BP Location: Left arm, Patient Position: Sitting, BP Method: Large (Automatic))   Pulse 69   Ht 6' (1.829 m)   Wt 101.2 kg (223 lb 1.7 oz)   SpO2 (!) 92%   BMI 30.26 kg/m²             Physical Exam  Constitutional:       Appearance: He is well-developed. He is ill-appearing.   HENT:      Head: Normocephalic.      Right Ear: External ear normal.      Left Ear: External ear normal.      Nose: Nose normal.   Eyes:      General: No scleral icterus.     Pupils: Pupils are equal, round, and reactive to light.   Neck:      Thyroid: No thyromegaly.      Vascular: No JVD.      Trachea: No tracheal deviation.   Cardiovascular:      Rate and Rhythm: Normal rate and regular rhythm.      Pulses: Intact distal pulses.      Heart sounds: Murmur heard.    Harsh early systolic murmur of grade 3/6 is also present at the upper right sternal border radiating to the neck.    No friction rub. No gallop.      Comments: No edema  JVP low but upright  Pulmonary:      Effort: Pulmonary effort is normal.      Breath sounds: Normal breath sounds.   Abdominal:      General: Bowel sounds  are normal. There is no distension.      Tenderness: There is no abdominal tenderness. There is no guarding.   Musculoskeletal:         General: No tenderness. Normal range of motion.      Cervical back: Normal range of motion and neck supple.   Lymphadenopathy:      Comments: Palpation of neck and groin lymph nodes normal   Skin:     General: Skin is dry.      Comments: Palpation of skin normal   Neurological:      Mental Status: He is alert and oriented to person, place, and time.      Cranial Nerves: No cranial nerve deficit.      Motor: No abnormal muscle tone.      Coordination: Coordination normal.   Psychiatric:         Behavior: Behavior normal.         Thought Content: Thought content normal.         Judgment: Judgment normal.           Assessment:       1. SOB (shortness of breath)    2. HCC (hepatocellular carcinoma)    3. History of liver transplant    4. Anemia, unspecified type    5. Nonrheumatic aortic valve stenosis    6. Type 2 diabetes mellitus without complication, unspecified whether long term insulin use    7. Diastolic dysfunction         Plan:       Tej was seen today for follow-up, establish care, dizziness and fatigue.    Diagnoses and all orders for this visit:    SOB (shortness of breath)  -     Ambulatory referral/consult to Cardiology  -     Six Minute Walk Test to qualify for Home Oxygen; Future  -     B-TYPE NATRIURETIC PEPTIDE; Future; Expected date: 04/05/2022    HCC (hepatocellular carcinoma)    History of liver transplant    Anemia, unspecified type    Nonrheumatic aortic valve stenosis  -     Six Minute Walk Test to qualify for Home Oxygen; Future  -     B-TYPE NATRIURETIC PEPTIDE; Future; Expected date: 04/05/2022    Type 2 diabetes mellitus without complication, unspecified whether long term insulin use    Diastolic dysfunction

## 2022-04-05 NOTE — TELEPHONE ENCOUNTER
Sent a message to let patient know to increase magnesium to 400 mg three times a day    ----- Message from Thais Maxwell MD sent at 4/4/2022  8:29 PM CDT -----  The Labs are stable - increase mg oxide to 400 gm tid -please let patient know.

## 2022-04-05 NOTE — PROCEDURES
Radiology Post-Procedure Note    Pre Op Diagnosis: Ascites  Post Op Diagnosis: Same    Procedure: Ultrasound Guided Paracentesis    Procedure performed by: Jes Peñaloza PA-C    Written Informed Consent Obtained: Yes  Specimen Removed: YES clear, yellow  Estimated Blood Loss: Minimal    Findings:   Successful paracentesis.   RLQ. Albumin administered PRN per protocol.    Patient tolerated procedure well.    Jes Peñaloza PA-C  Interventional Radiology  Clinic [phone]

## 2022-04-05 NOTE — H&P
Radiology History & Physical      SUBJECTIVE:     Chief Complaint: abdominal distention    History of Present Illness:  Tej Purdy is a 66 y.o. male who presents for ultrasound guided paracentesis.  Past Medical History:   Diagnosis Date    Atrial fibrillation     Cholangiocarcinoma 3/16/2022    Cirrhosis 11/23/2020    Diabetes mellitus, type 2     Diabetic neuropathy 11/24/2020    Disorder of kidney and ureter     HTN (hypertension) 11/23/2020    Hyperlipidemia 11/23/2020    Kidney stones 11/23/2020    S/p lithotripsy 2020    Leukocytosis 1/15/2021    Liver mass 11/23/2020    CASTILLO (nonalcoholic steatohepatitis) 11/23/2020    Other ascites 3/16/2022    PAD (peripheral artery disease)     Portal hypertension 11/23/2020    Skin cancer 11/23/2020     Past Surgical History:   Procedure Laterality Date    COLONOSCOPY  10/2020    ESOPHAGOGASTRODUODENOSCOPY  10/2020    ESOPHAGOGASTRODUODENOSCOPY N/A 7/1/2021    Procedure: EGD (ESOPHAGOGASTRODUODENOSCOPY);  Surgeon: Jovan David MD;  Location: [facility] (4TH FLR);  Service: Endoscopy;  Laterality: N/A;  HCC. Listed for liver transplant. EGD for variceal surveillance.  cardiac clearance and blood thinenr approval received, see telephone encounter 6/23/21-BB  fully vaccinated-BB  labs same day of procedure-BB    EXCISION OF HYDROCELE Right     hemorroid surgery      hemorroidectomy      KIDNEY STONE SURGERY      LEFT HEART CATHETERIZATION N/A 1/27/2021    Procedure: Left heart cath;  Surgeon: Kris Shelton MD;  Location: [facility] CATH LAB;  Service: Cardiology;  Laterality: N/A;    liver mass removal      LIVER TRANSPLANT N/A 1/6/2022    Procedure: TRANSPLANT, LIVER;  Surgeon: Lizet Garcia MD;  Location: [facility] OR 2ND FLR;  Service: Transplant;  Laterality: N/A;    RIGHT HEART CATHETERIZATION Right 5/7/2021    Procedure: INSERTION, CATHETER, RIGHT HEART;  Surgeon: Jaden Schuster MD;  Location: [facility] CATH LAB;  Service: Cardiology;   Laterality: Right;    TREATMENT OF CARDIAC ARRHYTHMIA N/A 5/19/2021    Procedure: CARDIOVERSION;  Surgeon: Didier Tilley MD;  Location: [facility] EP LAB;  Service: Cardiology;  Laterality: N/A;  a fib, dccv/johny, mac, dm. sscu    TREATMENT OF CARDIAC ARRHYTHMIA N/A 5/31/2021    Procedure: CARDIOVERSION;  Surgeon: Daniel Arambula MD;  Location: [facility] EP LAB;  Service: Cardiology;  Laterality: N/A;  afib, DCCV, anest., DM, 3 prep    TREATMENT OF CARDIAC ARRHYTHMIA N/A 7/7/2021    Procedure: Cardioversion or Defibrillation;  Surgeon: Didier Tilley MD;  Location: [facility] EP LAB;  Service: Cardiology;  Laterality: N/A;  AF, JOHNY (cancel if complaint), DCCV, MAC, DM, 3 Prep    TREATMENT OF CARDIAC ARRHYTHMIA N/A 7/27/2021    Procedure: Cardioversion or Defibrillation;  Surgeon: Didier Tilley MD;  Location: [facility] EP LAB;  Service: Cardiology;  Laterality: N/A;  AF, JOHNY (cx if complaint), DCCV, MAC, DM, 3 Prep       Home Meds:   Prior to Admission medications    Medication Sig Start Date End Date Taking? Authorizing Provider   amiodarone (PACERONE) 200 MG Tab Take 1 tablet (200 mg total) by mouth once daily. 1/13/22   Werner Zamarripa MD   apixaban (ELIQUIS) 5 mg Tab Take 1 tablet (5 mg total) by mouth 2 (two) times daily. 1/7/22   Leland Munoz MD   aspirin (ECOTRIN) 81 MG EC tablet Take 81 mg by mouth once daily.    Historical Provider   blood sugar diagnostic Strp To check BG 2 times daily, to use with insurance preferred meter (patient has a [employer] self-monitoring glucose meter) 2/16/22   Carlos Argueta MD   blood-glucose meter kit To check BG 2 times daily, to use with insurance preferred meter 7/16/21   Carlos Argueta MD   calcium carbonate (TUMS) 200 mg calcium (500 mg) chewable tablet Take 1 tablet (500 mg total) by mouth 3 (three) times daily as needed for Heartburn. 1/13/22 1/13/23  Werner Zamarripa MD   calcium carbonate-vitamin D3 600 mg-20 mcg (800 unit) Tab Take 1 tablet by mouth once daily.  "1/13/22   Werner Zamarripa MD   gabapentin (NEURONTIN) 300 MG capsule Take 1 capsule (300 mg total) by mouth 3 (three) times daily as needed (neuropathy). 1/13/22   Werner Zamarripa MD   insulin (LANTUS SOLOSTAR U-100 INSULIN) glargine 100 units/mL (3mL) SubQ pen Inject 24 Units into the skin every evening. 1/13/22   Werner Zamarripa MD   insulin lispro (HUMALOG KWIKPEN INSULIN) 100 unit/mL pen Inject 6 Units into the skin 3 (three) times daily. Plus sliding scale, MDD: 48 units 1/13/22 1/13/23  Werner Zamarripa MD   lancets Misc To check BG 2 times daily, to use with insurance preferred meter 2/16/22   Carlos Argueta MD   lovastatin (MEVACOR) 20 MG tablet Take 20 mg by mouth once daily. 1/20/22   Historical Provider   magnesium oxide (MAG-OX) 400 mg (241.3 mg magnesium) tablet Take 1 tablet (400 mg total) by mouth 2 (two) times daily. 2/6/22   Melvin Lozada MD   multivitamin capsule Take 1 capsule by mouth once daily.    Historical Provider   mycophenolate (CELLCEPT) 250 mg Cap Take 4 capsules (1,000 mg total) by mouth 2 (two) times daily. Resume on 2/11/22 2/11/22   Melvin Lozada MD   omega-3 fatty acids/fish oil (FISH OIL-OMEGA-3 FATTY ACIDS) 300-1,000 mg capsule Take 1 capsule by mouth once daily.     Historical Provider   ondansetron (ZOFRAN-ODT) 4 MG TbDL Take 1 tablet (4 mg total) by mouth every 8 (eight) hours as needed (nausea). 4/1/22   Thais Maxwell MD   pantoprazole (PROTONIX) 40 MG tablet Take 1 tablet (40 mg total) by mouth once daily. 1/13/22   Werner Zamarripa MD   pen needle, diabetic (BD ULTRA-FINE GÉNESSI PEN NEEDLE) 32 gauge x 5/32" Ndle Use with insulin once daily 3/21/22   Carlos Argueta MD   pulse oximeter (PULSE OXIMETER) device by Apply Externally route 2 (two) times a day. Use twice daily at 8 AM and 3 PM and record the value in MyChart as directed. 2/6/22   GENE Hou   sertraline (ZOLOFT) 25 MG tablet Take 1 tablet (25 mg total) by mouth once daily. 1/13/22   Werner Zamarripa MD "   sulfamethoxazole-trimethoprim 400-80mg (BACTRIM,SEPTRA) 400-80 mg per tablet Take 1 tablet by mouth every morning. STOP 7/7/22 1/13/22   Leland Munoz MD   tacrolimus (PROGRAF) 0.5 MG Cap Take 1 capsule (0.5 mg total) by mouth every 12 (twelve) hours. 4/4/22   Thais Maxwell MD   tamsulosin (FLOMAX) 0.4 mg Cap Take 1 capsule (0.4 mg total) by mouth every evening. 1/13/22 1/13/23  Werner Zamarripa MD   valGANciclovir (VALCYTE) 450 mg Tab Take 1 tablet (450 mg total) by mouth once daily. STOP 7/7/22 1/16/22 1/16/23  Leland Munoz MD   zolpidem (AMBIEN) 5 MG Tab Take 1 tablet (5 mg total) by mouth nightly as needed (insomnia). 4/1/22 9/30/22  Thais Maxwell MD   amLODIPine (NORVASC) 10 MG tablet Take 0.5 tablets (5 mg total) by mouth once daily. 4/1/22 4/5/22  Thais Maxwell MD   furosemide (LASIX) 40 MG tablet Take 1 tablet (40 mg total) by mouth once daily.  Patient taking differently: Take 20 mg by mouth once daily. 4/1/22 4/5/22  Thais Maxwell MD     Anticoagulants/Antiplatelets: aspirin and eliquis    Allergies:   Review of patient's allergies indicates:   Allergen Reactions    Bee pollens Swelling     BEE STINGS swells body     Sedation History:  no adverse reactions    Review of Systems:   Hematological: no known coagulopathies  Respiratory: no shortness of breath  Cardiovascular: no chest pain  Gastrointestinal: no abdominal pain  Genito-Urinary: no dysuria  Musculoskeletal: negative  Neurological: no TIA or stroke symptoms         OBJECTIVE:     Vital Signs (Most Recent)       Physical Exam:  ASA: 2  Mallampati: n/a    General: no acute distress  Mental Status: alert and oriented to person, place and time  HEENT: normocephalic, atraumatic  Chest: unlabored breathing  Heart: regular heart rate  Abdomen: distended  Extremity: moves all extremities    ASSESSMENT/PLAN:     Sedation Plan: local  Patient will undergo ultrasound guided paracentesis.    Jes Peñaloza  [facility]  Interventional Radiology  Clinic [phone]

## 2022-04-05 NOTE — PLAN OF CARE
Patient awake and alert, no distress noted, respirations even and unlabored. Allergies reviewed. Waiting for eval and consent.  Vitals:    04/05/22 1112   BP: 131/60   Pulse: 70   Resp: 20

## 2022-04-05 NOTE — PLAN OF CARE
Paracentesis done. Removed 2900 ml. Patient awake and alert, no distress noted, respirations even and unlabored.   Vitals:    04/05/22 1206   BP: (!) 123/59   Pulse: 82   Resp: 18

## 2022-04-08 LAB — BACTERIA SPEC AEROBE CULT: NO GROWTH

## 2022-04-11 ENCOUNTER — HOSPITAL ENCOUNTER (OUTPATIENT)
Dept: PULMONOLOGY | Facility: CLINIC | Age: 67
Discharge: HOME | End: 2022-04-11
Payer: MEDICARE

## 2022-04-11 VITALS — HEIGHT: 72 IN | WEIGHT: 223.13 LBS | BODY MASS INDEX: 30.22 KG/M2

## 2022-04-11 DIAGNOSIS — R06.02 SOB (SHORTNESS OF BREATH): ICD-10-CM

## 2022-04-11 DIAGNOSIS — I35.0 NONRHEUMATIC AORTIC VALVE STENOSIS: ICD-10-CM

## 2022-04-11 PROCEDURE — 94618 PULMONARY STRESS TESTING: ICD-10-PCS | Mod: S$GLB,,, | Performed by: INTERNAL MEDICINE

## 2022-04-11 PROCEDURE — 94618 PULMONARY STRESS TESTING: CPT | Mod: S$GLB,,, | Performed by: INTERNAL MEDICINE

## 2022-04-11 NOTE — PROCEDURES
Tej Purdy is a 66 y.o.  male patient, who presents for a 6 minute walk test ordered by MD Yogesh.  The diagnosis is Qualify for Oxygen.  The patient's BMI is 30.3 kg/m2.  Predicted distance (lower limit of normal) is 345.1 meters.      Test Results:    The test was completed with stops.  The patient stopped 2 times for a total of 20 seconds.  The total time walked was 340 seconds.  During walking, the patient reported:  Dyspnea, Leg pain, Lightheadedness. The patient used no assistive devices  during testing.     04/11/2022---------Distance: 152.4 meters (500 feet)     O2 Sat % Supplemental Oxygen Heart Rate Blood Pressure Jacqueline Scale   Pre-exercise  (Resting) 97 % Room Air 80 bpm 115/55 mmHg 0.5   During Exercise 97 % Room Air 104 bpm 117/57 mmHg 4   Post-exercise  (Recovery) 97 % Room Air  96 bpm   mmHg       Recovery Time: 65 seconds    Performing nurse/tech: Anila LOZADA      PREVIOUS STUDY:   The patient has not had a previous study.      CLINICAL INTERPRETATION:  Six minute walk distance is 152.4 meters (500 feet) with somewhat heavy dyspnea.  During exercise, there was no significant desaturation while breathing room air.  Both blood pressure and heart rate remained stable with walking.  The patient reported non-pulmonary symptoms during exercise.  Significant exercise impairment is likely due to subjective symptoms.  The patient did complete the study, walking 340 seconds of the 360 second test.  No previous study performed.  Based upon age and body mass index, exercise capacity is less than predicted.

## 2022-04-12 ENCOUNTER — LAB VISIT (OUTPATIENT)
Dept: LAB | Facility: HOSPITAL | Age: 67
End: 2022-04-12
Attending: INTERNAL MEDICINE
Payer: MEDICARE

## 2022-04-12 DIAGNOSIS — Z94.4 S/P LIVER TRANSPLANT: ICD-10-CM

## 2022-04-12 DIAGNOSIS — E83.42 HYPOMAGNESEMIA: ICD-10-CM

## 2022-04-12 LAB
ALBUMIN SERPL BCP-MCNC: 3.4 G/DL (ref 3.5–5.2)
ALP SERPL-CCNC: 56 U/L (ref 55–135)
ALT SERPL W/O P-5'-P-CCNC: 8 U/L (ref 10–44)
ANION GAP SERPL CALC-SCNC: 8 MMOL/L (ref 8–16)
ANISOCYTOSIS BLD QL SMEAR: SLIGHT
AST SERPL-CCNC: 18 U/L (ref 10–40)
BACTERIA SPEC ANAEROBE CULT: NORMAL
BASO STIPL BLD QL SMEAR: ABNORMAL
BASOPHILS # BLD AUTO: ABNORMAL K/UL (ref 0–0.2)
BASOPHILS NFR BLD: 1 % (ref 0–1.9)
BILIRUB DIRECT SERPL-MCNC: 0.2 MG/DL (ref 0.1–0.3)
BILIRUB SERPL-MCNC: 0.4 MG/DL (ref 0.1–1)
BUN SERPL-MCNC: 8 MG/DL (ref 8–23)
BURR CELLS BLD QL SMEAR: ABNORMAL
CALCIUM SERPL-MCNC: 9.3 MG/DL (ref 8.7–10.5)
CHLORIDE SERPL-SCNC: 102 MMOL/L (ref 95–110)
CO2 SERPL-SCNC: 28 MMOL/L (ref 23–29)
CREAT SERPL-MCNC: 0.9 MG/DL (ref 0.5–1.4)
DACRYOCYTES BLD QL SMEAR: ABNORMAL
DIFFERENTIAL METHOD: ABNORMAL
EOSINOPHIL # BLD AUTO: ABNORMAL K/UL (ref 0–0.5)
EOSINOPHIL NFR BLD: 4 % (ref 0–8)
ERYTHROCYTE [DISTWIDTH] IN BLOOD BY AUTOMATED COUNT: 15.9 % (ref 11.5–14.5)
EST. GFR  (AFRICAN AMERICAN): >60 ML/MIN/1.73 M^2
EST. GFR  (NON AFRICAN AMERICAN): >60 ML/MIN/1.73 M^2
GLUCOSE SERPL-MCNC: 69 MG/DL (ref 70–110)
HCT VFR BLD AUTO: 31.3 % (ref 40–54)
HGB BLD-MCNC: 9 G/DL (ref 14–18)
HYPOCHROMIA BLD QL SMEAR: ABNORMAL
IMM GRANULOCYTES # BLD AUTO: ABNORMAL K/UL (ref 0–0.04)
IMM GRANULOCYTES NFR BLD AUTO: ABNORMAL % (ref 0–0.5)
LYMPHOCYTES # BLD AUTO: ABNORMAL K/UL (ref 1–4.8)
LYMPHOCYTES NFR BLD: 25 % (ref 18–48)
MAGNESIUM SERPL-MCNC: 1.5 MG/DL (ref 1.6–2.6)
MCH RBC QN AUTO: 23 PG (ref 27–31)
MCHC RBC AUTO-ENTMCNC: 28.8 G/DL (ref 32–36)
MCV RBC AUTO: 80 FL (ref 82–98)
METAMYELOCYTES NFR BLD MANUAL: 5 %
MONOCYTES # BLD AUTO: ABNORMAL K/UL (ref 0.3–1)
MONOCYTES NFR BLD: 5 % (ref 4–15)
NEUTROPHILS NFR BLD: 57 % (ref 38–73)
NEUTS BAND NFR BLD MANUAL: 3 %
NRBC BLD-RTO: 0 /100 WBC
OVALOCYTES BLD QL SMEAR: ABNORMAL
PLATELET # BLD AUTO: 191 K/UL (ref 150–450)
PLATELET BLD QL SMEAR: ABNORMAL
PMV BLD AUTO: 10.7 FL (ref 9.2–12.9)
POIKILOCYTOSIS BLD QL SMEAR: SLIGHT
POLYCHROMASIA BLD QL SMEAR: ABNORMAL
POTASSIUM SERPL-SCNC: 4 MMOL/L (ref 3.5–5.1)
PROT SERPL-MCNC: 6.2 G/DL (ref 6–8.4)
RBC # BLD AUTO: 3.92 M/UL (ref 4.6–6.2)
SODIUM SERPL-SCNC: 138 MMOL/L (ref 136–145)
WBC # BLD AUTO: 3.3 K/UL (ref 3.9–12.7)

## 2022-04-12 PROCEDURE — 36415 COLL VENOUS BLD VENIPUNCTURE: CPT | Mod: PN | Performed by: INTERNAL MEDICINE

## 2022-04-12 PROCEDURE — 85007 BL SMEAR W/DIFF WBC COUNT: CPT | Performed by: INTERNAL MEDICINE

## 2022-04-12 PROCEDURE — 80053 COMPREHEN METABOLIC PANEL: CPT | Performed by: INTERNAL MEDICINE

## 2022-04-12 PROCEDURE — 85027 COMPLETE CBC AUTOMATED: CPT | Performed by: INTERNAL MEDICINE

## 2022-04-12 PROCEDURE — 82248 BILIRUBIN DIRECT: CPT | Performed by: INTERNAL MEDICINE

## 2022-04-12 PROCEDURE — 83735 ASSAY OF MAGNESIUM: CPT | Performed by: INTERNAL MEDICINE

## 2022-04-12 PROCEDURE — 80197 ASSAY OF TACROLIMUS: CPT | Performed by: INTERNAL MEDICINE

## 2022-04-13 LAB — TACROLIMUS BLD-MCNC: 3.5 NG/ML (ref 5–15)

## 2022-04-18 ENCOUNTER — PATIENT MESSAGE (OUTPATIENT)
Dept: TRANSPLANT | Facility: CLINIC | Age: 67
End: 2022-04-18
Payer: MEDICARE

## 2022-04-18 DIAGNOSIS — Z94.4 S/P LIVER TRANSPLANT: Primary | ICD-10-CM

## 2022-04-18 RX ORDER — MYCOPHENOLATE MOFETIL 250 MG/1
500 CAPSULE ORAL 2 TIMES DAILY
Qty: 120 CAPSULE | Refills: 2 | Status: SHIPPED | OUTPATIENT
Start: 2022-04-18 | End: 2022-05-20 | Stop reason: SINTOL

## 2022-04-18 NOTE — TELEPHONE ENCOUNTER
Sent message to patient to let him know to decrease Cellcept to 500 mg two times daily.  Next labs 4/20/22    ----- Message from Thais Maxwell MD sent at 4/15/2022  5:25 PM CDT -----  The Labs are stable - lower but continue cellcept to 500/500-please let patient know.

## 2022-04-20 ENCOUNTER — LAB VISIT (OUTPATIENT)
Dept: LAB | Facility: HOSPITAL | Age: 67
End: 2022-04-20
Attending: INTERNAL MEDICINE
Payer: MEDICARE

## 2022-04-20 ENCOUNTER — PATIENT MESSAGE (OUTPATIENT)
Dept: TRANSPLANT | Facility: CLINIC | Age: 67
End: 2022-04-20
Payer: MEDICARE

## 2022-04-20 ENCOUNTER — TELEPHONE (OUTPATIENT)
Dept: TRANSPLANT | Facility: CLINIC | Age: 67
End: 2022-04-20
Payer: MEDICARE

## 2022-04-20 ENCOUNTER — OFFICE VISIT (OUTPATIENT)
Dept: TRANSPLANT | Facility: CLINIC | Age: 67
End: 2022-04-20
Payer: MEDICARE

## 2022-04-20 VITALS
HEART RATE: 84 BPM | BODY MASS INDEX: 30.04 KG/M2 | SYSTOLIC BLOOD PRESSURE: 124 MMHG | TEMPERATURE: 98 F | HEIGHT: 72 IN | DIASTOLIC BLOOD PRESSURE: 61 MMHG | WEIGHT: 221.81 LBS | OXYGEN SATURATION: 97 % | RESPIRATION RATE: 18 BRPM

## 2022-04-20 DIAGNOSIS — R06.02 SOB (SHORTNESS OF BREATH): ICD-10-CM

## 2022-04-20 DIAGNOSIS — Z94.4 S/P LIVER TRANSPLANT: ICD-10-CM

## 2022-04-20 DIAGNOSIS — Z29.89 PROPHYLACTIC IMMUNOTHERAPY: ICD-10-CM

## 2022-04-20 DIAGNOSIS — R18.8 OTHER ASCITES: ICD-10-CM

## 2022-04-20 DIAGNOSIS — E83.42 HYPOMAGNESEMIA: ICD-10-CM

## 2022-04-20 DIAGNOSIS — C22.1 CHOLANGIOCARCINOMA: ICD-10-CM

## 2022-04-20 DIAGNOSIS — Z79.60 LONG-TERM USE OF IMMUNOSUPPRESSANT MEDICATION: ICD-10-CM

## 2022-04-20 DIAGNOSIS — R53.83 FATIGUE, UNSPECIFIED TYPE: Primary | ICD-10-CM

## 2022-04-20 DIAGNOSIS — R53.1 WEAKNESS: ICD-10-CM

## 2022-04-20 LAB
ALBUMIN SERPL BCP-MCNC: 3.6 G/DL (ref 3.5–5.2)
ALP SERPL-CCNC: 58 U/L (ref 55–135)
ALT SERPL W/O P-5'-P-CCNC: 8 U/L (ref 10–44)
ANION GAP SERPL CALC-SCNC: 10 MMOL/L (ref 8–16)
ANISOCYTOSIS BLD QL SMEAR: SLIGHT
AST SERPL-CCNC: 20 U/L (ref 10–40)
BASOPHILS # BLD AUTO: ABNORMAL K/UL (ref 0–0.2)
BASOPHILS NFR BLD: 0 % (ref 0–1.9)
BILIRUB SERPL-MCNC: 0.4 MG/DL (ref 0.1–1)
BUN SERPL-MCNC: 9 MG/DL (ref 8–23)
CALCIUM SERPL-MCNC: 9.4 MG/DL (ref 8.7–10.5)
CHLORIDE SERPL-SCNC: 104 MMOL/L (ref 95–110)
CO2 SERPL-SCNC: 25 MMOL/L (ref 23–29)
CREAT SERPL-MCNC: 0.9 MG/DL (ref 0.5–1.4)
DIFFERENTIAL METHOD: ABNORMAL
EOSINOPHIL # BLD AUTO: ABNORMAL K/UL (ref 0–0.5)
EOSINOPHIL NFR BLD: 1 % (ref 0–8)
ERYTHROCYTE [DISTWIDTH] IN BLOOD BY AUTOMATED COUNT: 16.3 % (ref 11.5–14.5)
EST. GFR  (AFRICAN AMERICAN): >60 ML/MIN/1.73 M^2
EST. GFR  (NON AFRICAN AMERICAN): >60 ML/MIN/1.73 M^2
GLUCOSE SERPL-MCNC: 75 MG/DL (ref 70–110)
HCT VFR BLD AUTO: 30.4 % (ref 40–54)
HGB BLD-MCNC: 8.9 G/DL (ref 14–18)
IMM GRANULOCYTES # BLD AUTO: ABNORMAL K/UL (ref 0–0.04)
IMM GRANULOCYTES NFR BLD AUTO: ABNORMAL % (ref 0–0.5)
LYMPHOCYTES # BLD AUTO: ABNORMAL K/UL (ref 1–4.8)
LYMPHOCYTES NFR BLD: 21 % (ref 18–48)
MAGNESIUM SERPL-MCNC: 1.7 MG/DL (ref 1.6–2.6)
MCH RBC QN AUTO: 23.3 PG (ref 27–31)
MCHC RBC AUTO-ENTMCNC: 29.3 G/DL (ref 32–36)
MCV RBC AUTO: 80 FL (ref 82–98)
METAMYELOCYTES NFR BLD MANUAL: 6 %
MONOCYTES # BLD AUTO: ABNORMAL K/UL (ref 0.3–1)
MONOCYTES NFR BLD: 14 % (ref 4–15)
MYELOCYTES NFR BLD MANUAL: 1 %
NEUTROPHILS NFR BLD: 54 % (ref 38–73)
NEUTS BAND NFR BLD MANUAL: 3 %
NRBC BLD-RTO: 0 /100 WBC
PLATELET # BLD AUTO: 186 K/UL (ref 150–450)
PLATELET BLD QL SMEAR: ABNORMAL
PMV BLD AUTO: 11.2 FL (ref 9.2–12.9)
POTASSIUM SERPL-SCNC: 4.2 MMOL/L (ref 3.5–5.1)
PROT SERPL-MCNC: 6.5 G/DL (ref 6–8.4)
RBC # BLD AUTO: 3.82 M/UL (ref 4.6–6.2)
SODIUM SERPL-SCNC: 139 MMOL/L (ref 136–145)
WBC # BLD AUTO: 3.06 K/UL (ref 3.9–12.7)

## 2022-04-20 PROCEDURE — 1160F RVW MEDS BY RX/DR IN RCRD: CPT | Mod: CPTII,S$GLB,, | Performed by: INTERNAL MEDICINE

## 2022-04-20 PROCEDURE — 99999 PR PBB SHADOW E&M-EST. PATIENT-LVL V: CPT | Mod: PBBFAC,,, | Performed by: INTERNAL MEDICINE

## 2022-04-20 PROCEDURE — 1126F AMNT PAIN NOTED NONE PRSNT: CPT | Mod: CPTII,S$GLB,, | Performed by: INTERNAL MEDICINE

## 2022-04-20 PROCEDURE — 1160F PR REVIEW ALL MEDS BY PRESCRIBER/CLIN PHARMACIST DOCUMENTED: ICD-10-PCS | Mod: CPTII,S$GLB,, | Performed by: INTERNAL MEDICINE

## 2022-04-20 PROCEDURE — 36415 COLL VENOUS BLD VENIPUNCTURE: CPT | Mod: PN | Performed by: INTERNAL MEDICINE

## 2022-04-20 PROCEDURE — 4010F ACE/ARB THERAPY RXD/TAKEN: CPT | Mod: CPTII,S$GLB,, | Performed by: INTERNAL MEDICINE

## 2022-04-20 PROCEDURE — 85027 COMPLETE CBC AUTOMATED: CPT | Performed by: INTERNAL MEDICINE

## 2022-04-20 PROCEDURE — 85007 BL SMEAR W/DIFF WBC COUNT: CPT | Performed by: INTERNAL MEDICINE

## 2022-04-20 PROCEDURE — 3078F DIAST BP <80 MM HG: CPT | Mod: CPTII,S$GLB,, | Performed by: INTERNAL MEDICINE

## 2022-04-20 PROCEDURE — 3074F PR MOST RECENT SYSTOLIC BLOOD PRESSURE < 130 MM HG: ICD-10-PCS | Mod: CPTII,S$GLB,, | Performed by: INTERNAL MEDICINE

## 2022-04-20 PROCEDURE — 99215 PR OFFICE/OUTPT VISIT, EST, LEVL V, 40-54 MIN: ICD-10-PCS | Mod: S$GLB,,, | Performed by: INTERNAL MEDICINE

## 2022-04-20 PROCEDURE — 1159F PR MEDICATION LIST DOCUMENTED IN MEDICAL RECORD: ICD-10-PCS | Mod: CPTII,S$GLB,, | Performed by: INTERNAL MEDICINE

## 2022-04-20 PROCEDURE — 3008F PR BODY MASS INDEX (BMI) DOCUMENTED: ICD-10-PCS | Mod: CPTII,S$GLB,, | Performed by: INTERNAL MEDICINE

## 2022-04-20 PROCEDURE — 99999 PR PBB SHADOW E&M-EST. PATIENT-LVL V: ICD-10-PCS | Mod: PBBFAC,,, | Performed by: INTERNAL MEDICINE

## 2022-04-20 PROCEDURE — 3078F PR MOST RECENT DIASTOLIC BLOOD PRESSURE < 80 MM HG: ICD-10-PCS | Mod: CPTII,S$GLB,, | Performed by: INTERNAL MEDICINE

## 2022-04-20 PROCEDURE — 3288F PR FALLS RISK ASSESSMENT DOCUMENTED: ICD-10-PCS | Mod: CPTII,S$GLB,, | Performed by: INTERNAL MEDICINE

## 2022-04-20 PROCEDURE — 1101F PR PT FALLS ASSESS DOC 0-1 FALLS W/OUT INJ PAST YR: ICD-10-PCS | Mod: CPTII,S$GLB,, | Performed by: INTERNAL MEDICINE

## 2022-04-20 PROCEDURE — 1159F MED LIST DOCD IN RCRD: CPT | Mod: CPTII,S$GLB,, | Performed by: INTERNAL MEDICINE

## 2022-04-20 PROCEDURE — 3288F FALL RISK ASSESSMENT DOCD: CPT | Mod: CPTII,S$GLB,, | Performed by: INTERNAL MEDICINE

## 2022-04-20 PROCEDURE — 3074F SYST BP LT 130 MM HG: CPT | Mod: CPTII,S$GLB,, | Performed by: INTERNAL MEDICINE

## 2022-04-20 PROCEDURE — 3044F HG A1C LEVEL LT 7.0%: CPT | Mod: CPTII,S$GLB,, | Performed by: INTERNAL MEDICINE

## 2022-04-20 PROCEDURE — 3044F PR MOST RECENT HEMOGLOBIN A1C LEVEL <7.0%: ICD-10-PCS | Mod: CPTII,S$GLB,, | Performed by: INTERNAL MEDICINE

## 2022-04-20 PROCEDURE — 1126F PR PAIN SEVERITY QUANTIFIED, NO PAIN PRESENT: ICD-10-PCS | Mod: CPTII,S$GLB,, | Performed by: INTERNAL MEDICINE

## 2022-04-20 PROCEDURE — 3008F BODY MASS INDEX DOCD: CPT | Mod: CPTII,S$GLB,, | Performed by: INTERNAL MEDICINE

## 2022-04-20 PROCEDURE — 4010F PR ACE/ARB THEARPY RXD/TAKEN: ICD-10-PCS | Mod: CPTII,S$GLB,, | Performed by: INTERNAL MEDICINE

## 2022-04-20 PROCEDURE — 99215 OFFICE O/P EST HI 40 MIN: CPT | Mod: S$GLB,,, | Performed by: INTERNAL MEDICINE

## 2022-04-20 PROCEDURE — 80197 ASSAY OF TACROLIMUS: CPT | Performed by: INTERNAL MEDICINE

## 2022-04-20 PROCEDURE — 1101F PT FALLS ASSESS-DOCD LE1/YR: CPT | Mod: CPTII,S$GLB,, | Performed by: INTERNAL MEDICINE

## 2022-04-20 PROCEDURE — 80053 COMPREHEN METABOLIC PANEL: CPT | Performed by: INTERNAL MEDICINE

## 2022-04-20 PROCEDURE — 83735 ASSAY OF MAGNESIUM: CPT | Performed by: INTERNAL MEDICINE

## 2022-04-20 NOTE — TELEPHONE ENCOUNTER
----- Message from Jeanette Fountain sent at 4/20/2022  3:44 PM CDT -----    ----- Message -----  From: Thais Maxwell MD  Sent: 4/6/2022   4:47 PM CDT  To: Alexi Zuñiga MD, #    SAAG 3.4-2.3= 1.1  Protein 3.2    C/w cardiac ascites. Cardiology to evaluate and manage

## 2022-04-20 NOTE — PROGRESS NOTES
Transplant Hepatology  Liver Transplant Recipient Follow-up    Transplant Date: 1/6/2022  UNOS Native Liver Dx: Primary Liver Malignancy: Hepatoma (HCC) and Cirrhosis    Tej is here for follow up of his liver transplant.    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     Subjective:     Interval History: Tej is now 3 months post liver transplant. Currently, he overall much better. He has better energy and is less fatigued.    He has undergone evaluation of ascites:  --Paracentesis 4/5/22: 2.9 L; albumin 2.3, protein 3.2, peripheral albumin 3.4; SAAG 3.4-2.3= 1.1; c/w cardiac ascites  --Paracentesis 3/22/22: 2.6 L, not infected; not sent for albumin or protein (he did have a 4.4 L paracentesis during a recent readmission); repeat paracentesis scheduled   --US abdo with doppler 4/4/22: Interval increase in resistive indices now normal to upper normal/mildly elevated.  Recommend follow-up ultrasound; Mild ascites about the liver, stable to minimally increased compared to prior exam; Right pleural effusion.  Abdo US with doppler 2/10/22: Satisfactory Doppler evaluation of the liver allograft.  Intrahepatic arterial resistive indices have normalized  --2D echo 3/25/22: EF 60%; mild RV enlargement with normal RVSP; grade II diastolic dysfunction; severe atrial enlargement; mod-severe aortic stenosis; PAP 41 mm Hg; normal central venous pressure (3 mmHg); BNP 4/11/22: 91  CXR 3 18/22: Normal heart size.   Arch calcification.  Normal pulmonary vasculature.  Interval developed trace-small left pleural effusion.  No left lung infiltrate and no left pneumothorax.  As was noted on the earlier CT, elevated posterior right hemidiaphragm.  Interval developed trace-small right pleural effusion and minimal right basilar infiltrate likely compressive atelectatic change.  No right pneumothorax.  --lasix: now off    --had covid early 01/26/22-received monoclonal antibody    Six minute walk test 4/11/22: Six minute walk distance is 152.4 meters (500 feet) with somewhat heavy dyspnea.  During exercise, there was no significant desaturation while breathing room air.  Both blood pressure and heart rate remained stable with walking.  The patient reported non-pulmonary symptoms during exercise.  Significant exercise impairment is likely due to subjective symptoms.  The patient did complete the study, walking 340 seconds of the 360 second test.  No previous study performed.  Based upon age and body mass index, exercise capacity is less than predicted.       O2 Sat % Supplemental Oxygen Heart Rate Blood Pressure Jacqueline Scale   Pre-exercise  (Resting) 97 % Room Air 80 bpm 115/55 mmHg 0.5   During Exercise 97 % Room Air 104 bpm 117/57 mmHg 4   Post-exercise  (Recovery) 97 % Room Air  96 bpm   mmHg          Allograft function 4/12/22: ALT 8, AST 18, ALKP 56, Tbil 0.4, creat 0.9, prograf level 3.5  IS: prograf and cellcept 500/500  Immunoprophylaxis:  PCP PROPHYLAXIS: Bactrim 7/7/2022  CMV PROPHYLAXIS: valcyte until 7/7/2022; CMV negative 3/21/22  FUNGAL PROPHYLAXISL nystatin completed  Aspirin: YES/NO(WHY) DOSE: 81 mg   Apixaban for AFIB      Hosp 2/1/22-2/6/22: Mr. Purdy was admitted 2/1 for increasing weakness. Labs on admission revealed a h/h of 5.5/17.9. Has received 2 units of PRBCs as of 2/2, h/h responded appropriately. ASA and elliquis held. CT abd/pelvis was attained to assess for hemorrhage. CT revealed new moderate abdominopelvic  ascites that appears slightly complex in the pelvis. Para 2/3 with 4.5L of bloody fluid drained, labs with some concern for SBP, cefepime started. Occult blood stool negative. Cardiac w/u without acute findings, infectious w/u with BCs NGTD, clean UA. CXR with no acute cardiopulmonary disease. Transitioned to cefpoxodine  on 2/5 for 7 days     Hx cholangiocarcinoma: saw oncology: Given his relatively poor performance status, anemia and the marginal survival benefit of adjuvant chemotherapy even in non-transplant patients, I do not think that the benefits of chemotherapy outweigh the risk for Mr. Purdy. He is in agreement with this plan.  We will instead proceed with surveillance.  I will repeat a CT CAP in 3 months with plan for q3 months imaging for year 1.    CA 19-9 4/4/22: <2.1    Review of Systems   Constitutional: Positive for fatigue.   HENT: Negative.    Eyes: Negative.    Respiratory: Negative.    Cardiovascular: Negative.    Gastrointestinal: Negative.    Genitourinary: Negative.    Musculoskeletal: Negative.    Skin: Negative.    Neurological: Positive for weakness.   Psychiatric/Behavioral: Negative.        Objective:     Vitals:    04/20/22 1446   BP: 124/61   Pulse: 84   Resp: 18   Temp: 98.3 °F (36.8 °C)     Physical Exam  Vitals reviewed.   Constitutional:       Appearance: He is well-developed.   HENT:      Head: Normocephalic and atraumatic.   Eyes:      General: No scleral icterus.     Conjunctiva/sclera: Conjunctivae normal.      Pupils: Pupils are equal, round, and reactive to light.   Neck:      Thyroid: No thyromegaly.   Cardiovascular:      Rate and Rhythm: Normal rate and regular rhythm.      Heart sounds: Normal heart sounds.   Pulmonary:      Effort: Pulmonary effort is normal.      Breath sounds: Normal breath sounds. No rales.   Abdominal:      General: Bowel sounds are normal. There is distension.      Palpations: Abdomen is soft. There is no mass.      Tenderness: There is no  abdominal tenderness.   Musculoskeletal:         General: Normal range of motion.      Cervical back: Normal range of motion and neck supple.   Skin:     General: Skin is warm and dry.      Findings: No rash.   Neurological:      Mental Status: He is alert and oriented to person, place, and time.       Lab Results   Component Value Date    BILITOT 0.4 04/12/2022    AST 18 04/12/2022    ALT 8 (L) 04/12/2022    ALKPHOS 56 04/12/2022    CREATININE 0.9 04/12/2022    ALBUMIN 3.4 (L) 04/12/2022     Lab Results   Component Value Date    WBC 3.30 (L) 04/12/2022    HGB 9.0 (L) 04/12/2022    HCT 31.3 (L) 04/12/2022    HCT 33 (L) 01/06/2022     04/12/2022     Lab Results   Component Value Date    TACROLIMUS 3.5 (L) 04/12/2022       Assessment/Plan:   The patient is a 66 year old male who is now 3 months post liver transplant. He is finally feeling better. His post transplant course has been complicated by a covid infection and signifcant anemia requiring 2 units of PRBC and ascites. With paracentesis and lasix he has lost 10 lbs. W/u for ascites suggestive of cardiac ascites, but echo not significant for heart failure. He has good allograft and renal function. Hemoglobin is adequate. Current recommendations:  1. S/p liver transplant and control of IS: good allograft function;  prograf target 3-5 continue cellcept; labs every 2 weeks  2. Immunoprophylaxis:  PCP PROPHYLAXIS: Bactrim 7/7/2022  CMV PROPHYLAXIS: valcyte until 7/7/2022  FUNGAL PROPHYLAXISL nystatin  Aspirin: YES/NO(WHY) DOSE: 81 mg   *Apixaban for AFIB*  3. Cholangiocarcinoma: chemotx deferred; surveillance imaging and cea/ca19-9 every 3 months  4. FAtigue and weakness with RLQ pain: unclear etiolgoy; recommend CT abdo/pelvis w wo contrast; cxr pa and lat; 2D echo; check cmv pcr;   5. AScites: monitor  6. HTN: now off norvasc; monitor    Return 4 weeks    Patient bers receive Covid vaccination    Thais Maxwell MD           UNOS Patient Status  Functional  Status: 70% - Cares for self: unable to carry on normal activity or active work  Physical Capacity: No Limitations    New diabetes onset between last follow-up to the current follow-up: No  Did patient have any acute rejection episodes during the follow-up period: No  Post transplant malignancy: No

## 2022-04-20 NOTE — PATIENT INSTRUCTIONS
No changes today  I will communicate with Dr Zuñiga  Monitor fluid- if reaccumulates then retap and reanalyze fluid

## 2022-04-20 NOTE — LETTER
April 30, 2022        Jessika Carbone  [address]  [facility] [address]  Phone: [phone]  Fax: [phone]             [facility]  [address] [address]  Phone: [phone]  Fax: [phone]   Patient: Tej Purdy   MR Number: [id number]   YOB: 1955   Date of Visit: 4/20/2022       Dear Dr. Jessika Carbone    Thank you for referring Tej Purdy to me for evaluation. Attached you will find relevant portions of my assessment and plan of care.    If you have questions, please do not hesitate to call me. I look forward to following Tej Purdy along with you.    Sincerely,    Thais Maxwell MD    Enclosure    If you would like to receive this communication electronically, please contact [email or web address] or [phone] to request [employer] access.    [employer] is a tool which provides read-only access to select patient information with whom you have a relationship. Its easy to use and provides real time access to review your patients record including encounter summaries, notes, results, and demographic information.    If you feel you have received this communication in error or would no longer like to receive these types of communications, please e-mail [email or web address]

## 2022-04-21 LAB — TACROLIMUS BLD-MCNC: 3.8 NG/ML (ref 5–15)

## 2022-04-28 ENCOUNTER — PATIENT MESSAGE (OUTPATIENT)
Dept: TRANSPLANT | Facility: CLINIC | Age: 67
End: 2022-04-28
Payer: MEDICARE

## 2022-04-28 ENCOUNTER — TELEPHONE (OUTPATIENT)
Dept: TRANSPLANT | Facility: CLINIC | Age: 67
End: 2022-04-28
Payer: MEDICARE

## 2022-04-28 NOTE — TELEPHONE ENCOUNTER
Sent patient message via portal to let him know labs are stable.  No medication changes, next labs due on 5/02/22      ----- Message from Thais Maxwell MD sent at 4/27/2022 10:36 PM CDT -----  The Labs are stable - please let patient know.

## 2022-05-02 ENCOUNTER — LAB VISIT (OUTPATIENT)
Dept: LAB | Facility: HOSPITAL | Age: 67
End: 2022-05-02
Attending: INTERNAL MEDICINE
Payer: MEDICARE

## 2022-05-02 DIAGNOSIS — Z94.4 S/P LIVER TRANSPLANT: ICD-10-CM

## 2022-05-02 DIAGNOSIS — E83.42 HYPOMAGNESEMIA: ICD-10-CM

## 2022-05-02 LAB
ALBUMIN SERPL BCP-MCNC: 3.5 G/DL (ref 3.5–5.2)
ALP SERPL-CCNC: 58 U/L (ref 55–135)
ALT SERPL W/O P-5'-P-CCNC: 9 U/L (ref 10–44)
ANION GAP SERPL CALC-SCNC: 9 MMOL/L (ref 8–16)
AST SERPL-CCNC: 16 U/L (ref 10–40)
BASOPHILS # BLD AUTO: 0.05 K/UL (ref 0–0.2)
BASOPHILS NFR BLD: 1.7 % (ref 0–1.9)
BILIRUB SERPL-MCNC: 0.4 MG/DL (ref 0.1–1)
BUN SERPL-MCNC: 10 MG/DL (ref 8–23)
CALCIUM SERPL-MCNC: 9.2 MG/DL (ref 8.7–10.5)
CHLORIDE SERPL-SCNC: 103 MMOL/L (ref 95–110)
CO2 SERPL-SCNC: 27 MMOL/L (ref 23–29)
CREAT SERPL-MCNC: 1 MG/DL (ref 0.5–1.4)
DIFFERENTIAL METHOD: ABNORMAL
EOSINOPHIL # BLD AUTO: 0 K/UL (ref 0–0.5)
EOSINOPHIL NFR BLD: 1.4 % (ref 0–8)
ERYTHROCYTE [DISTWIDTH] IN BLOOD BY AUTOMATED COUNT: 16.8 % (ref 11.5–14.5)
EST. GFR  (AFRICAN AMERICAN): >60 ML/MIN/1.73 M^2
EST. GFR  (NON AFRICAN AMERICAN): >60 ML/MIN/1.73 M^2
GLUCOSE SERPL-MCNC: 130 MG/DL (ref 70–110)
HCT VFR BLD AUTO: 30.6 % (ref 40–54)
HGB BLD-MCNC: 8.8 G/DL (ref 14–18)
IMM GRANULOCYTES # BLD AUTO: 0.1 K/UL (ref 0–0.04)
IMM GRANULOCYTES NFR BLD AUTO: 3.4 % (ref 0–0.5)
LYMPHOCYTES # BLD AUTO: 0.4 K/UL (ref 1–4.8)
LYMPHOCYTES NFR BLD: 15.1 % (ref 18–48)
MAGNESIUM SERPL-MCNC: 1.6 MG/DL (ref 1.6–2.6)
MCH RBC QN AUTO: 22.6 PG (ref 27–31)
MCHC RBC AUTO-ENTMCNC: 28.8 G/DL (ref 32–36)
MCV RBC AUTO: 79 FL (ref 82–98)
MONOCYTES # BLD AUTO: 0.4 K/UL (ref 0.3–1)
MONOCYTES NFR BLD: 13.4 % (ref 4–15)
NEUTROPHILS # BLD AUTO: 1.9 K/UL (ref 1.8–7.7)
NEUTROPHILS NFR BLD: 65 % (ref 38–73)
NRBC BLD-RTO: 0 /100 WBC
PLATELET # BLD AUTO: 165 K/UL (ref 150–450)
PMV BLD AUTO: 11.4 FL (ref 9.2–12.9)
POTASSIUM SERPL-SCNC: 4.3 MMOL/L (ref 3.5–5.1)
PROT SERPL-MCNC: 6.4 G/DL (ref 6–8.4)
RBC # BLD AUTO: 3.89 M/UL (ref 4.6–6.2)
SODIUM SERPL-SCNC: 139 MMOL/L (ref 136–145)
WBC # BLD AUTO: 2.91 K/UL (ref 3.9–12.7)

## 2022-05-02 PROCEDURE — 36415 COLL VENOUS BLD VENIPUNCTURE: CPT | Mod: PN | Performed by: INTERNAL MEDICINE

## 2022-05-02 PROCEDURE — 85025 COMPLETE CBC W/AUTO DIFF WBC: CPT | Performed by: INTERNAL MEDICINE

## 2022-05-02 PROCEDURE — 83735 ASSAY OF MAGNESIUM: CPT | Performed by: INTERNAL MEDICINE

## 2022-05-02 PROCEDURE — 80197 ASSAY OF TACROLIMUS: CPT | Performed by: INTERNAL MEDICINE

## 2022-05-02 PROCEDURE — 80053 COMPREHEN METABOLIC PANEL: CPT | Performed by: INTERNAL MEDICINE

## 2022-05-03 ENCOUNTER — TELEPHONE (OUTPATIENT)
Dept: TRANSPLANT | Facility: CLINIC | Age: 67
End: 2022-05-03
Payer: MEDICARE

## 2022-05-03 ENCOUNTER — PATIENT MESSAGE (OUTPATIENT)
Dept: TRANSPLANT | Facility: CLINIC | Age: 67
End: 2022-05-03
Payer: MEDICARE

## 2022-05-03 LAB — TACROLIMUS BLD-MCNC: 3.5 NG/ML (ref 5–15)

## 2022-05-03 NOTE — TELEPHONE ENCOUNTER
Sent patient message via portal to let him know labs are stable.  No medication changes, next labs due on 05/09/22    ----- Message from Thais Maxwell MD sent at 5/3/2022 11:19 AM CDT -----  The Labs are stable - please let patient know.

## 2022-05-07 RX ORDER — TAMSULOSIN HYDROCHLORIDE 0.4 MG/1
0.4 CAPSULE ORAL NIGHTLY
Qty: 30 CAPSULE | Refills: 1 | Status: CANCELLED | OUTPATIENT
Start: 2022-05-07 | End: 2023-05-07

## 2022-05-09 ENCOUNTER — LAB VISIT (OUTPATIENT)
Dept: LAB | Facility: HOSPITAL | Age: 67
End: 2022-05-09
Attending: INTERNAL MEDICINE
Payer: MEDICARE

## 2022-05-09 DIAGNOSIS — Z94.4 S/P LIVER TRANSPLANT: ICD-10-CM

## 2022-05-09 DIAGNOSIS — E83.42 HYPOMAGNESEMIA: ICD-10-CM

## 2022-05-09 LAB
ALBUMIN SERPL BCP-MCNC: 3.5 G/DL (ref 3.5–5.2)
ALP SERPL-CCNC: 62 U/L (ref 55–135)
ALT SERPL W/O P-5'-P-CCNC: 11 U/L (ref 10–44)
ANION GAP SERPL CALC-SCNC: 10 MMOL/L (ref 8–16)
AST SERPL-CCNC: 17 U/L (ref 10–40)
BASOPHILS # BLD AUTO: 0.04 K/UL (ref 0–0.2)
BASOPHILS NFR BLD: 1.7 % (ref 0–1.9)
BILIRUB SERPL-MCNC: 0.4 MG/DL (ref 0.1–1)
BUN SERPL-MCNC: 11 MG/DL (ref 8–23)
CALCIUM SERPL-MCNC: 9.4 MG/DL (ref 8.7–10.5)
CHLORIDE SERPL-SCNC: 103 MMOL/L (ref 95–110)
CO2 SERPL-SCNC: 26 MMOL/L (ref 23–29)
CREAT SERPL-MCNC: 0.9 MG/DL (ref 0.5–1.4)
DIFFERENTIAL METHOD: ABNORMAL
EOSINOPHIL # BLD AUTO: 0 K/UL (ref 0–0.5)
EOSINOPHIL NFR BLD: 1.7 % (ref 0–8)
ERYTHROCYTE [DISTWIDTH] IN BLOOD BY AUTOMATED COUNT: 16.8 % (ref 11.5–14.5)
EST. GFR  (AFRICAN AMERICAN): >60 ML/MIN/1.73 M^2
EST. GFR  (NON AFRICAN AMERICAN): >60 ML/MIN/1.73 M^2
GLUCOSE SERPL-MCNC: 84 MG/DL (ref 70–110)
HCT VFR BLD AUTO: 30.8 % (ref 40–54)
HGB BLD-MCNC: 8.9 G/DL (ref 14–18)
IMM GRANULOCYTES # BLD AUTO: 0.04 K/UL (ref 0–0.04)
IMM GRANULOCYTES NFR BLD AUTO: 1.7 % (ref 0–0.5)
LYMPHOCYTES # BLD AUTO: 0.6 K/UL (ref 1–4.8)
LYMPHOCYTES NFR BLD: 25.1 % (ref 18–48)
MAGNESIUM SERPL-MCNC: 1.6 MG/DL (ref 1.6–2.6)
MCH RBC QN AUTO: 21.9 PG (ref 27–31)
MCHC RBC AUTO-ENTMCNC: 28.9 G/DL (ref 32–36)
MCV RBC AUTO: 76 FL (ref 82–98)
MONOCYTES # BLD AUTO: 0.3 K/UL (ref 0.3–1)
MONOCYTES NFR BLD: 11.9 % (ref 4–15)
NEUTROPHILS # BLD AUTO: 1.4 K/UL (ref 1.8–7.7)
NEUTROPHILS NFR BLD: 57.9 % (ref 38–73)
NRBC BLD-RTO: 0 /100 WBC
PLATELET # BLD AUTO: 151 K/UL (ref 150–450)
PMV BLD AUTO: 11.4 FL (ref 9.2–12.9)
POTASSIUM SERPL-SCNC: 4.1 MMOL/L (ref 3.5–5.1)
PROT SERPL-MCNC: 6.4 G/DL (ref 6–8.4)
RBC # BLD AUTO: 4.06 M/UL (ref 4.6–6.2)
SODIUM SERPL-SCNC: 139 MMOL/L (ref 136–145)
WBC # BLD AUTO: 2.35 K/UL (ref 3.9–12.7)

## 2022-05-09 PROCEDURE — 80197 ASSAY OF TACROLIMUS: CPT | Performed by: INTERNAL MEDICINE

## 2022-05-09 PROCEDURE — 36415 COLL VENOUS BLD VENIPUNCTURE: CPT | Mod: PN | Performed by: INTERNAL MEDICINE

## 2022-05-09 PROCEDURE — 85025 COMPLETE CBC W/AUTO DIFF WBC: CPT | Performed by: INTERNAL MEDICINE

## 2022-05-09 PROCEDURE — 80053 COMPREHEN METABOLIC PANEL: CPT | Performed by: INTERNAL MEDICINE

## 2022-05-09 PROCEDURE — 83735 ASSAY OF MAGNESIUM: CPT | Performed by: INTERNAL MEDICINE

## 2022-05-10 ENCOUNTER — TELEPHONE (OUTPATIENT)
Dept: TRANSPLANT | Facility: CLINIC | Age: 67
End: 2022-05-10
Payer: MEDICARE

## 2022-05-10 LAB — TACROLIMUS BLD-MCNC: 3.7 NG/ML (ref 5–15)

## 2022-05-10 RX ORDER — TAMSULOSIN HYDROCHLORIDE 0.4 MG/1
0.4 CAPSULE ORAL NIGHTLY
Qty: 30 CAPSULE | Refills: 1 | Status: SHIPPED | OUTPATIENT
Start: 2022-05-10 | End: 2022-06-30

## 2022-05-11 ENCOUNTER — PATIENT MESSAGE (OUTPATIENT)
Dept: RESEARCH | Facility: CLINIC | Age: 67
End: 2022-05-11
Payer: MEDICARE

## 2022-05-13 ENCOUNTER — HOSPITAL ENCOUNTER (OUTPATIENT)
Dept: RADIOLOGY | Facility: HOSPITAL | Age: 67
Discharge: HOME | End: 2022-05-13
Attending: INTERNAL MEDICINE
Payer: MEDICARE

## 2022-05-13 ENCOUNTER — DOCUMENTATION ONLY (OUTPATIENT)
Dept: HEMATOLOGY/ONCOLOGY | Facility: CLINIC | Age: 67
End: 2022-05-13
Payer: MEDICARE

## 2022-05-13 DIAGNOSIS — C22.1 CHOLANGIOCARCINOMA: ICD-10-CM

## 2022-05-13 PROCEDURE — 74176 CT ABD & PELVIS W/O CONTRAST: CPT | Mod: TC

## 2022-05-13 PROCEDURE — 74176 CT ABD & PELVIS W/O CONTRAST: CPT | Mod: 26,,, | Performed by: RADIOLOGY

## 2022-05-13 PROCEDURE — 71250 CT THORAX DX C-: CPT | Mod: 26,,, | Performed by: RADIOLOGY

## 2022-05-13 PROCEDURE — 74176 CT CHEST ABDOMEN PELVIS WITHOUT CONTRAST(XPD): ICD-10-PCS | Mod: 26,,, | Performed by: RADIOLOGY

## 2022-05-13 PROCEDURE — 71250 CT CHEST ABDOMEN PELVIS WITHOUT CONTRAST(XPD): ICD-10-PCS | Mod: 26,,, | Performed by: RADIOLOGY

## 2022-05-16 ENCOUNTER — OFFICE VISIT (OUTPATIENT)
Dept: HEMATOLOGY/ONCOLOGY | Facility: CLINIC | Age: 67
End: 2022-05-16
Payer: MEDICARE

## 2022-05-16 ENCOUNTER — TELEPHONE (OUTPATIENT)
Dept: TRANSPLANT | Facility: CLINIC | Age: 67
End: 2022-05-16
Payer: MEDICARE

## 2022-05-16 ENCOUNTER — PATIENT MESSAGE (OUTPATIENT)
Dept: TRANSPLANT | Facility: CLINIC | Age: 67
End: 2022-05-16
Payer: MEDICARE

## 2022-05-16 VITALS
WEIGHT: 217.81 LBS | SYSTOLIC BLOOD PRESSURE: 145 MMHG | HEART RATE: 83 BPM | TEMPERATURE: 98 F | RESPIRATION RATE: 18 BRPM | BODY MASS INDEX: 29.5 KG/M2 | DIASTOLIC BLOOD PRESSURE: 67 MMHG | OXYGEN SATURATION: 95 % | HEIGHT: 72 IN

## 2022-05-16 DIAGNOSIS — I48.19 PERSISTENT ATRIAL FIBRILLATION: ICD-10-CM

## 2022-05-16 DIAGNOSIS — R18.8 OTHER ASCITES: ICD-10-CM

## 2022-05-16 DIAGNOSIS — D84.821 IMMUNODEFICIENCY DUE TO DRUGS: ICD-10-CM

## 2022-05-16 DIAGNOSIS — Z79.899 IMMUNODEFICIENCY DUE TO DRUGS: ICD-10-CM

## 2022-05-16 DIAGNOSIS — C22.1 CHOLANGIOCARCINOMA: Primary | ICD-10-CM

## 2022-05-16 DIAGNOSIS — E78.5 HYPERLIPIDEMIA, UNSPECIFIED HYPERLIPIDEMIA TYPE: ICD-10-CM

## 2022-05-16 DIAGNOSIS — Z94.4 S/P LIVER TRANSPLANT: ICD-10-CM

## 2022-05-16 DIAGNOSIS — D63.0 ANEMIA IN NEOPLASTIC DISEASE: ICD-10-CM

## 2022-05-16 DIAGNOSIS — R06.02 SOB (SHORTNESS OF BREATH): ICD-10-CM

## 2022-05-16 DIAGNOSIS — E11.42 DIABETIC POLYNEUROPATHY ASSOCIATED WITH TYPE 2 DIABETES MELLITUS: ICD-10-CM

## 2022-05-16 DIAGNOSIS — R53.83 FATIGUE, UNSPECIFIED TYPE: ICD-10-CM

## 2022-05-16 DIAGNOSIS — Z91.89 AT RISK FOR OPPORTUNISTIC INFECTIONS: ICD-10-CM

## 2022-05-16 DIAGNOSIS — E66.9 CLASS 1 OBESITY WITH SERIOUS COMORBIDITY AND BODY MASS INDEX (BMI) OF 31.0 TO 31.9 IN ADULT, UNSPECIFIED OBESITY TYPE: ICD-10-CM

## 2022-05-16 DIAGNOSIS — I73.9 PAD (PERIPHERAL ARTERY DISEASE): ICD-10-CM

## 2022-05-16 PROCEDURE — 99214 PR OFFICE/OUTPT VISIT, EST, LEVL IV, 30-39 MIN: ICD-10-PCS | Mod: S$GLB,,, | Performed by: INTERNAL MEDICINE

## 2022-05-16 PROCEDURE — 3288F PR FALLS RISK ASSESSMENT DOCUMENTED: ICD-10-PCS | Mod: CPTII,S$GLB,, | Performed by: INTERNAL MEDICINE

## 2022-05-16 PROCEDURE — 3008F PR BODY MASS INDEX (BMI) DOCUMENTED: ICD-10-PCS | Mod: CPTII,S$GLB,, | Performed by: INTERNAL MEDICINE

## 2022-05-16 PROCEDURE — 99214 OFFICE O/P EST MOD 30 MIN: CPT | Mod: S$GLB,,, | Performed by: INTERNAL MEDICINE

## 2022-05-16 PROCEDURE — 1101F PT FALLS ASSESS-DOCD LE1/YR: CPT | Mod: CPTII,S$GLB,, | Performed by: INTERNAL MEDICINE

## 2022-05-16 PROCEDURE — 3044F PR MOST RECENT HEMOGLOBIN A1C LEVEL <7.0%: ICD-10-PCS | Mod: CPTII,S$GLB,, | Performed by: INTERNAL MEDICINE

## 2022-05-16 PROCEDURE — 99999 PR PBB SHADOW E&M-EST. PATIENT-LVL V: ICD-10-PCS | Mod: PBBFAC,,, | Performed by: INTERNAL MEDICINE

## 2022-05-16 PROCEDURE — 4010F ACE/ARB THERAPY RXD/TAKEN: CPT | Mod: CPTII,S$GLB,, | Performed by: INTERNAL MEDICINE

## 2022-05-16 PROCEDURE — 3288F FALL RISK ASSESSMENT DOCD: CPT | Mod: CPTII,S$GLB,, | Performed by: INTERNAL MEDICINE

## 2022-05-16 PROCEDURE — 1101F PR PT FALLS ASSESS DOC 0-1 FALLS W/OUT INJ PAST YR: ICD-10-PCS | Mod: CPTII,S$GLB,, | Performed by: INTERNAL MEDICINE

## 2022-05-16 PROCEDURE — 1159F MED LIST DOCD IN RCRD: CPT | Mod: CPTII,S$GLB,, | Performed by: INTERNAL MEDICINE

## 2022-05-16 PROCEDURE — 99999 PR PBB SHADOW E&M-EST. PATIENT-LVL V: CPT | Mod: PBBFAC,,, | Performed by: INTERNAL MEDICINE

## 2022-05-16 PROCEDURE — 3078F DIAST BP <80 MM HG: CPT | Mod: CPTII,S$GLB,, | Performed by: INTERNAL MEDICINE

## 2022-05-16 PROCEDURE — 3077F PR MOST RECENT SYSTOLIC BLOOD PRESSURE >= 140 MM HG: ICD-10-PCS | Mod: CPTII,S$GLB,, | Performed by: INTERNAL MEDICINE

## 2022-05-16 PROCEDURE — 1160F PR REVIEW ALL MEDS BY PRESCRIBER/CLIN PHARMACIST DOCUMENTED: ICD-10-PCS | Mod: CPTII,S$GLB,, | Performed by: INTERNAL MEDICINE

## 2022-05-16 PROCEDURE — 1160F RVW MEDS BY RX/DR IN RCRD: CPT | Mod: CPTII,S$GLB,, | Performed by: INTERNAL MEDICINE

## 2022-05-16 PROCEDURE — 4010F PR ACE/ARB THEARPY RXD/TAKEN: ICD-10-PCS | Mod: CPTII,S$GLB,, | Performed by: INTERNAL MEDICINE

## 2022-05-16 PROCEDURE — 3077F SYST BP >= 140 MM HG: CPT | Mod: CPTII,S$GLB,, | Performed by: INTERNAL MEDICINE

## 2022-05-16 PROCEDURE — 3078F PR MOST RECENT DIASTOLIC BLOOD PRESSURE < 80 MM HG: ICD-10-PCS | Mod: CPTII,S$GLB,, | Performed by: INTERNAL MEDICINE

## 2022-05-16 PROCEDURE — 1159F PR MEDICATION LIST DOCUMENTED IN MEDICAL RECORD: ICD-10-PCS | Mod: CPTII,S$GLB,, | Performed by: INTERNAL MEDICINE

## 2022-05-16 PROCEDURE — 3008F BODY MASS INDEX DOCD: CPT | Mod: CPTII,S$GLB,, | Performed by: INTERNAL MEDICINE

## 2022-05-16 PROCEDURE — 3044F HG A1C LEVEL LT 7.0%: CPT | Mod: CPTII,S$GLB,, | Performed by: INTERNAL MEDICINE

## 2022-05-16 NOTE — PROGRESS NOTES
MEDICAL ONCOLOGY - NEW PATIENT VISIT    Reason for visit: Intrahepatic cholangiocarcinoma    Best Contact Phone Number(s): [phone] (home)      Cancer/Stage/TNM:   Cancer Staging  No matching staging information was found for the patient.     Oncology History   HCC (hepatocellular carcinoma) (Resolved)   12/29/2020 Initial Diagnosis    HCC (hepatocellular carcinoma)          HPI:   66 y.o. male with CASTILLO cirrhosis s/p OLT 1/2022, HTN, a-fib, T2DM who presents for discussion of management after his liver explant showed cholangiocarcinoma on pathology.  He was initially noted to have a liver mass in late 2020 and was treated with TACE and RFA by IR in December 2020 - this was presumed to be HCC based on imaging characteristics.  He was followed thereafter without progression.  He was taken for liver transplantation on 1/6/22.  Since transplant he contracted Covid 19 infection.  Then he was in the hospital at the beginning of February for acute blood loss anemia with hgb of 5.5 with findings of bloody ascites that was drained.  No clear site of bleeding, improved with blood transfusions.     Patient returns to clinic today with wife for follow-up. Reports decreased energy, though slight improvement from prior visit. He has been walking more with the help of a cane. He states he continues to have some dizziness and lightheadedness, particularly with walking and position changes. Admits to not drinking enough water. Reports mild SOB with exertion and consistent but stable neuropathy relieved with intermittent use of gabapentin. His appetite has slightly increased and reports relief of mild nausea with use of zofran. He does report constipation rarely needing miralax. He continues to have some discomfort on his right side, and he has undergone paracentesis x 3 since surgery. Overall feeling good. Denies fever, chills, and chest pain.     History has been obtained by chart review and discussion with the  patient.    ROS:   Review of Systems   Constitutional: Positive for malaise/fatigue and weight loss. Negative for chills and fever.   HENT: Negative for congestion, hearing loss and sore throat.    Eyes: Negative for blurred vision and pain.   Respiratory: Positive for shortness of breath (exertional). Negative for cough.    Cardiovascular: Negative for chest pain, palpitations and leg swelling.   Gastrointestinal: Positive for abdominal pain (right sided), constipation (intermittent) and nausea. Negative for blood in stool, diarrhea and vomiting.   Genitourinary: Negative for dysuria, frequency and hematuria.   Musculoskeletal: Negative for back pain, falls and myalgias.   Skin: Negative for rash.   Neurological: Positive for dizziness, sensory change (stable neuropathy) and weakness (generalized, improving). Negative for headaches.   Endo/Heme/Allergies: Does not bruise/bleed easily.   Psychiatric/Behavioral: Negative for depression. The patient is not nervous/anxious and does not have insomnia.    All other systems reviewed and are negative.      Past Medical History:   Past Medical History:   Diagnosis Date    Atrial fibrillation     Cholangiocarcinoma 3/16/2022    Cirrhosis 11/23/2020    Diabetes mellitus, type 2     Diabetic neuropathy 11/24/2020    Disorder of kidney and ureter     HTN (hypertension) 11/23/2020    Hyperlipidemia 11/23/2020    Kidney stones 11/23/2020    S/p lithotripsy 2020    Leukocytosis 1/15/2021    Liver mass 11/23/2020    CASTILLO (nonalcoholic steatohepatitis) 11/23/2020    Other ascites 3/16/2022    PAD (peripheral artery disease)     Portal hypertension 11/23/2020    Skin cancer 11/23/2020        Past Surgical History:   Past Surgical History:   Procedure Laterality Date    COLONOSCOPY  10/2020    ESOPHAGOGASTRODUODENOSCOPY  10/2020    ESOPHAGOGASTRODUODENOSCOPY N/A 7/1/2021    Procedure: EGD (ESOPHAGOGASTRODUODENOSCOPY);  Surgeon: Jovan David MD;  Location: [facility]  ENDO (4TH FLR);  Service: Endoscopy;  Laterality: N/A;  HCC. Listed for liver transplant. EGD for variceal surveillance.  cardiac clearance and blood thinenr approval received, see telephone encounter 6/23/21-BB  fully vaccinated-BB  labs same day of procedure-BB    EXCISION OF HYDROCELE Right     hemorroid surgery      hemorroidectomy      KIDNEY STONE SURGERY      LEFT HEART CATHETERIZATION N/A 1/27/2021    Procedure: Left heart cath;  Surgeon: Kris Shelton MD;  Location: [facility] CATH LAB;  Service: Cardiology;  Laterality: N/A;    liver mass removal      LIVER TRANSPLANT N/A 1/6/2022    Procedure: TRANSPLANT, LIVER;  Surgeon: Lizet Garcia MD;  Location: [facility] OR 2ND FLR;  Service: Transplant;  Laterality: N/A;    RIGHT HEART CATHETERIZATION Right 5/7/2021    Procedure: INSERTION, CATHETER, RIGHT HEART;  Surgeon: Jaden Schuster MD;  Location: [facility] CATH LAB;  Service: Cardiology;  Laterality: Right;    TREATMENT OF CARDIAC ARRHYTHMIA N/A 5/19/2021    Procedure: CARDIOVERSION;  Surgeon: Didier Tilley MD;  Location: [facility] EP LAB;  Service: Cardiology;  Laterality: N/A;  a fib, dccv/johny, mac, dm. sscu    TREATMENT OF CARDIAC ARRHYTHMIA N/A 5/31/2021    Procedure: CARDIOVERSION;  Surgeon: Daniel Arambula MD;  Location: [facility] EP LAB;  Service: Cardiology;  Laterality: N/A;  afib, DCCV, anest., DM, 3 prep    TREATMENT OF CARDIAC ARRHYTHMIA N/A 7/7/2021    Procedure: Cardioversion or Defibrillation;  Surgeon: Didier Tilley MD;  Location: [facility] EP LAB;  Service: Cardiology;  Laterality: N/A;  AF, JOHNY (cancel if complaint), DCCV, MAC, DM, 3 Prep    TREATMENT OF CARDIAC ARRHYTHMIA N/A 7/27/2021    Procedure: Cardioversion or Defibrillation;  Surgeon: Didier Tilley MD;  Location: [facility] EP LAB;  Service: Cardiology;  Laterality: N/A;  AF, JOHNY (cx if complaint), DCCV, MAC, DM, 3 Prep        Family History:   No family history on file.     Social History:   Social History     Tobacco Use    Smoking  status: Former Smoker     Quit date: 1980     Years since quittin.3    Smokeless tobacco: Never Used   Substance Use Topics    Alcohol use: Not Currently        I have reviewed and updated the patient's past medical, surgical, family and social histories.    Allergies:   Review of patient's allergies indicates:   Allergen Reactions    Bee pollens Swelling     BEE STINGS swells body        Medications:   Current Outpatient Medications   Medication Sig Dispense Refill    amiodarone (PACERONE) 200 MG Tab Take 1 tablet (200 mg total) by mouth once daily. 30 tablet 11    apixaban (ELIQUIS) 5 mg Tab Take 1 tablet (5 mg total) by mouth 2 (two) times daily. 60 tablet 11    aspirin (ECOTRIN) 81 MG EC tablet Take 81 mg by mouth once daily.      blood sugar diagnostic Strp To check BG 2 times daily, to use with insurance preferred meter (patient has a [employer] self-monitoring glucose meter) 100 strip 11    blood-glucose meter kit To check BG 2 times daily, to use with insurance preferred meter 1 each 0    calcium carbonate (TUMS) 200 mg calcium (500 mg) chewable tablet Take 1 tablet (500 mg total) by mouth 3 (three) times daily as needed for Heartburn. 90 tablet 5    calcium carbonate-vitamin D3 600 mg-20 mcg (800 unit) Tab Take 1 tablet by mouth once daily. 30 tablet 5    gabapentin (NEURONTIN) 300 MG capsule Take 1 capsule (300 mg total) by mouth 3 (three) times daily as needed (neuropathy). (Patient not taking: Reported on 2022) 90 capsule 5    insulin (LANTUS SOLOSTAR U-100 INSULIN) glargine 100 units/mL (3mL) SubQ pen Inject 24 Units into the skin every evening. 9 mL 11    insulin lispro (HUMALOG KWIKPEN INSULIN) 100 unit/mL pen Inject 6 Units into the skin 3 (three) times daily. Plus sliding scale, MDD: 48 units 15 mL 4    lancets Misc To check BG 2 times daily, to use with insurance preferred meter 100 each 11    lovastatin (MEVACOR) 20 MG tablet Take 20 mg by mouth once daily.       "magnesium oxide (MAG-OX) 400 mg (241.3 mg magnesium) tablet Take 1 tablet (400 mg total) by mouth 3 (three) times daily. 90 tablet 6    multivitamin capsule Take 1 capsule by mouth once daily.      mycophenolate (CELLCEPT) 250 mg Cap Take 2 capsules (500 mg total) by mouth 2 (two) times daily. Resume on 2/11/22 120 capsule 2    omega-3 fatty acids/fish oil (FISH OIL-OMEGA-3 FATTY ACIDS) 300-1,000 mg capsule Take 1 capsule by mouth once daily.       ondansetron (ZOFRAN-ODT) 4 MG TbDL Take 1 tablet (4 mg total) by mouth every 8 (eight) hours as needed (nausea). 30 tablet 5    pantoprazole (PROTONIX) 40 MG tablet Take 1 tablet (40 mg total) by mouth once daily. 30 tablet 5    pen needle, diabetic (BD ULTRA-FINE GÉNESIS PEN NEEDLE) 32 gauge x 5/32" Ndle Use with insulin once daily 100 each 3    pulse oximeter (PULSE OXIMETER) device by Apply Externally route 2 (two) times a day. Use twice daily at 8 AM and 3 PM and record the value in [place] as directed. 1 each 0    sertraline (ZOLOFT) 25 MG tablet Take 1 tablet (25 mg total) by mouth once daily. 30 tablet 5    sulfamethoxazole-trimethoprim 400-80mg (BACTRIM,SEPTRA) 400-80 mg per tablet Take 1 tablet by mouth every morning. STOP 7/7/22 30 tablet 5    tacrolimus (PROGRAF) 0.5 MG Cap Take 1 capsule (0.5 mg total) by mouth every 12 (twelve) hours. 60 capsule 11    tamsulosin (FLOMAX) 0.4 mg Cap Take 1 capsule (0.4 mg total) by mouth every evening. 30 capsule 1    valGANciclovir (VALCYTE) 450 mg Tab Take 1 tablet (450 mg total) by mouth once daily. STOP 7/7/22 30 tablet 5    zolpidem (AMBIEN) 5 MG Tab Take 1 tablet (5 mg total) by mouth nightly as needed (insomnia). 20 tablet 0     No current facility-administered medications for this visit.     Facility-Administered Medications Ordered in Other Visits   Medication Dose Route Frequency Provider Last Rate Last Admin    sodium chloride 0.9% bolus 1,000 mL  1,000 mL Intravenous Once Solange Bill NP        "     Physical Exam:   BP (!) 145/67 (BP Location: Right arm, Patient Position: Sitting, BP Method: Large (Automatic))   Pulse 83   Temp 98.1 °F (36.7 °C) (Oral)   Resp 18   Ht 6' (1.829 m)   Wt 98.8 kg (217 lb 13 oz)   SpO2 95%   BMI 29.54 kg/m²      ECOG Performance status: 3            Physical Exam  Vitals reviewed.   Constitutional:       General: He is not in acute distress.     Appearance: Normal appearance. He is obese. He is not ill-appearing, toxic-appearing or diaphoretic.   HENT:      Head: Normocephalic and atraumatic.      Right Ear: External ear normal.      Left Ear: External ear normal.      Nose: Nose normal.      Mouth/Throat:      Mouth: Mucous membranes are moist.      Pharynx: Oropharynx is clear. No posterior oropharyngeal erythema.   Eyes:      General: No scleral icterus.     Extraocular Movements: Extraocular movements intact.      Conjunctiva/sclera: Conjunctivae normal.      Pupils: Pupils are equal, round, and reactive to light.   Cardiovascular:      Rate and Rhythm: Normal rate and regular rhythm.      Pulses: Normal pulses.      Heart sounds: Normal heart sounds.   Pulmonary:      Effort: Pulmonary effort is normal. No respiratory distress.      Breath sounds: Normal breath sounds. No wheezing or rales.   Chest:      Chest wall: No tenderness.   Abdominal:      General: Bowel sounds are normal. There is no distension.      Palpations: Abdomen is soft.      Tenderness: There is no abdominal tenderness. There is no right CVA tenderness or left CVA tenderness.      Comments: Horizontal abdominal surgical wound well-healing   Musculoskeletal:         General: No swelling. Normal range of motion.      Cervical back: Normal range of motion and neck supple.   Skin:     General: Skin is warm.      Coloration: Skin is not jaundiced.      Findings: No erythema or rash.   Neurological:      General: No focal deficit present.      Mental Status: He is alert and oriented to person, place, and  time. Mental status is at baseline.      Cranial Nerves: No cranial nerve deficit.      Motor: No weakness.      Gait: Gait normal.   Psychiatric:         Mood and Affect: Mood normal.         Behavior: Behavior normal.         Thought Content: Thought content normal.         Judgment: Judgment normal.           Labs:   No results found for this or any previous visit (from the past 48 hour(s)).     I have reviewed the pertinent labs from 5/13/22 which are notable for hgb 9.4, normal renal and hepatic function. WBC 1.71, no signs of infection.     Imaging:       I have personally reviewed the 5/13/22 CT A/P imaging which is notable for ascites, no clear evidence of metastatic disease.    Path:   Component 1 mo ago   Final Pathologic Diagnosis 1. Allograft liver, time-zero, needle biopsy:   - Mild large- and small-droplet macrovesicular steatosis (5-10%)   - No significant fibrosis   - See comment   2. Explant liver, total hepatectomy:   - Adenocarcinoma involving liver, consistent with cholangiocarcinoma   - Background liver with steatohepatitis and cirrhosis (stage 4 of 4);   etiology CASTILLO per clinical history   - Pathologic stage: [id number]   - See synoptic report   SYNOPTIC   Procedure: Total hepatectomy   Histologic Type: Cholangiocarcinoma NOS   Histologic Grade: G3, poorly differentiated   Tumor Focality: Solitary tumor   Tumor Size: 7.0 cm   Tumor Extent: Confined to hepatic parenchyma   Lymphovascular Invasion: Not identified   Margin Status for Invasive Carcinoma:     All margins negative for invasive carcinoma   Margin Status for High-Grade Intraepithelial Neoplasia:     All margins negative for high-grade intraepithelial neoplasia   Regional Lymph Nodes:      Number of Lymph Nodes with Tumor: 0      Number of Lymph Nodes Examined: 1   Pathologic Stage (AJCC 8th Edition):   TNM Descriptors: y (post-treatment)   pT Category: pT1b; Solitary tumor greater than 5 cm without vascular invasion   pN Category: pN0;  No regional lymph node metastases   Additional findings:   - Gallbladder with no diagnostic histopathologic alterations            Assessment:       1. Cholangiocarcinoma          Plan:     # Cholangiocarcinoma  His explant pathology after OLT on 1/6/22 showed cholangiocarcinoma.  He had received TACE and RFA to the mass that was presumed HCC in 12/2020.  No progression of disease until his transplant.  I discussed with him that the pathology demonstrated cholangiocarcinoma instead of HCC, which does confer a worsened prognosis in the way of higher chance of recurrence. There is essentially no data on optimal management in this scenario in terms of adjuvant chemotherapy - only that the risk of recurrence is relatively high.  Adjuvant therapy would be an extrapolation from surgically resected cholangiocarcinoma in the way of the BILCAP trial with Xeloda x 6 months.      Given his relatively poor performance status, anemia and the marginal survival benefit of adjuvant chemotherapy even in non-transplant patients, I do not think that the benefits of chemotherapy outweigh the risk for Mr. Purdy. He is in agreement with this plan.  We will instead proceed with surveillance.    I will repeat a CT CAP in 3 months with plan for q3 months imaging for year 1.      # Liver transplant  Continue close f/u with liver transplant team.  Immunosuppressive therapy per transplant.  WBC critically low. Will discuss tacrolimus with Dr. Maxwell.     # Ascites  S/p paracentesis x 3.   Removed 5L first drainage, followed by 2-3L each time after.   Moderate ascites noted on scans.   Repeat para per Dr. Maxwell.   Continue to f/u with hepatology.     # T2DM, PAD, HLD, a-fib, aortic atherosclerosis, obesity  Continue current medical management.  Weight stable in clinic.     # Anemia  Continues on Eliquis. H&H stable.   He will continue to get short interval labs with transplant team.     # SOB  On exertion.   Likely r/t deconditioning and  ascites.   Scans negative for metastatic disease or infectious process.   Continue to monitor.     Follow up: 3 months with imaging, labs.     Patient is in agreement with the proposed treatment plan. All questions were answered to the patient's satisfaction. Pt knows to call clinic if anything is needed before the next clinic visit.    Patient discussed with collaborating physician, Dr. Ordoñez.        Sindy Cole, MSN, APRN, [facility]  Hematology and Medical Oncology  Clinical Nurse Specialist to Dr. Ordoñez & Dr. Hayes            Route Chart for Scheduling    Med Onc Chart Routing      Follow up with physician 3 months.   Follow up with [facility]    Labs CBC, CMP and CA 19-9   Lab interval:     Imaging CT chest abdomen pelvis      Pharmacy appointment    Other referrals

## 2022-05-16 NOTE — Clinical Note
Laurence Maxwell, we saw Mr. Purdy in clinic yesterday. WBC are 1.7. Is there anything we need to do on our end to improve this, or do you think it may be r/t tacrolimus/transplant?

## 2022-05-16 NOTE — TELEPHONE ENCOUNTER
Sent patient message via portal to let him know labs are stable.  No medication changes, next labs due on 05/23/22      ----- Message from Thais Maxwell MD sent at 5/15/2022  5:14 PM CDT -----  The Labs are stable - please let patient know.

## 2022-05-17 ENCOUNTER — PATIENT MESSAGE (OUTPATIENT)
Dept: ENDOCRINOLOGY | Facility: CLINIC | Age: 67
End: 2022-05-17
Payer: MEDICARE

## 2022-05-17 ENCOUNTER — LAB VISIT (OUTPATIENT)
Dept: LAB | Facility: HOSPITAL | Age: 67
End: 2022-05-17
Attending: INTERNAL MEDICINE
Payer: MEDICARE

## 2022-05-17 DIAGNOSIS — Z79.4 TYPE 2 DIABETES MELLITUS WITH HYPERGLYCEMIA, WITH LONG-TERM CURRENT USE OF INSULIN: ICD-10-CM

## 2022-05-17 DIAGNOSIS — E11.65 TYPE 2 DIABETES MELLITUS WITH HYPERGLYCEMIA, WITH LONG-TERM CURRENT USE OF INSULIN: ICD-10-CM

## 2022-05-17 LAB
ANION GAP SERPL CALC-SCNC: 9 MMOL/L (ref 8–16)
BUN SERPL-MCNC: 14 MG/DL (ref 8–23)
CALCIUM SERPL-MCNC: 9.5 MG/DL (ref 8.7–10.5)
CHLORIDE SERPL-SCNC: 104 MMOL/L (ref 95–110)
CO2 SERPL-SCNC: 26 MMOL/L (ref 23–29)
CREAT SERPL-MCNC: 1 MG/DL (ref 0.5–1.4)
EST. GFR  (AFRICAN AMERICAN): >60 ML/MIN/1.73 M^2
EST. GFR  (NON AFRICAN AMERICAN): >60 ML/MIN/1.73 M^2
ESTIMATED AVG GLUCOSE: 120 MG/DL (ref 68–131)
GLUCOSE SERPL-MCNC: 75 MG/DL (ref 70–110)
HBA1C MFR BLD: 5.8 % (ref 4–5.6)
POTASSIUM SERPL-SCNC: 4.2 MMOL/L (ref 3.5–5.1)
SODIUM SERPL-SCNC: 139 MMOL/L (ref 136–145)

## 2022-05-17 PROCEDURE — 36415 COLL VENOUS BLD VENIPUNCTURE: CPT | Mod: PN | Performed by: INTERNAL MEDICINE

## 2022-05-17 PROCEDURE — 80048 BASIC METABOLIC PNL TOTAL CA: CPT | Performed by: INTERNAL MEDICINE

## 2022-05-17 PROCEDURE — 83036 HEMOGLOBIN GLYCOSYLATED A1C: CPT | Performed by: INTERNAL MEDICINE

## 2022-05-17 RX ORDER — PEN NEEDLE, DIABETIC 30 GX3/16"
NEEDLE, DISPOSABLE MISCELLANEOUS
Qty: 200 EACH | Refills: 11 | Status: SHIPPED | OUTPATIENT
Start: 2022-05-17 | End: 2022-08-23 | Stop reason: SDUPTHER

## 2022-05-17 RX ORDER — PEN NEEDLE, DIABETIC 30 GX3/16"
NEEDLE, DISPOSABLE MISCELLANEOUS
Qty: 100 EACH | Refills: 11 | Status: SHIPPED | OUTPATIENT
Start: 2022-05-17 | End: 2022-05-17 | Stop reason: SDUPTHER

## 2022-05-17 NOTE — PROGRESS NOTES
Patient, Tej Purdy (MRN #[id number]), presented with a recent Platelet count less than 150 K/uL consistent with the definition of thrombocytopenia (ICD10 - D69.6).    Platelets   Date Value Ref Range Status   05/13/2022 140 (L) 150 - 450 K/uL Final     The patient's thrombocytopenia was monitored, evaluated, addressed and/or treated. This addendum to the medical record is made on 05/17/2022.

## 2022-05-18 ENCOUNTER — PATIENT MESSAGE (OUTPATIENT)
Dept: ENDOCRINOLOGY | Facility: CLINIC | Age: 67
End: 2022-05-18
Payer: MEDICARE

## 2022-05-20 ENCOUNTER — TELEPHONE (OUTPATIENT)
Dept: TRANSPLANT | Facility: CLINIC | Age: 67
End: 2022-05-20
Payer: MEDICARE

## 2022-05-20 NOTE — TELEPHONE ENCOUNTER
Per ASHIA Ochoa: hold MMF due to leukopenia; repeat  labs on Monday.       Patient notified and above instructions were given; he was able to repeat instructions and voiced understanding.

## 2022-05-23 ENCOUNTER — OFFICE VISIT (OUTPATIENT)
Dept: ENDOCRINOLOGY | Facility: CLINIC | Age: 67
End: 2022-05-23
Payer: MEDICARE

## 2022-05-23 ENCOUNTER — LAB VISIT (OUTPATIENT)
Dept: LAB | Facility: HOSPITAL | Age: 67
End: 2022-05-23
Attending: INTERNAL MEDICINE
Payer: MEDICARE

## 2022-05-23 VITALS
BODY MASS INDEX: 29.26 KG/M2 | HEART RATE: 75 BPM | DIASTOLIC BLOOD PRESSURE: 68 MMHG | WEIGHT: 216 LBS | SYSTOLIC BLOOD PRESSURE: 152 MMHG | HEIGHT: 72 IN

## 2022-05-23 DIAGNOSIS — E78.2 MIXED HYPERLIPIDEMIA: ICD-10-CM

## 2022-05-23 DIAGNOSIS — E11.42 DIABETIC POLYNEUROPATHY ASSOCIATED WITH TYPE 2 DIABETES MELLITUS: ICD-10-CM

## 2022-05-23 DIAGNOSIS — Z79.4 TYPE 2 DIABETES MELLITUS WITH HYPERGLYCEMIA, WITH LONG-TERM CURRENT USE OF INSULIN: Primary | ICD-10-CM

## 2022-05-23 DIAGNOSIS — E11.65 TYPE 2 DIABETES MELLITUS WITH HYPERGLYCEMIA, WITH LONG-TERM CURRENT USE OF INSULIN: Primary | ICD-10-CM

## 2022-05-23 DIAGNOSIS — E83.42 HYPOMAGNESEMIA: ICD-10-CM

## 2022-05-23 DIAGNOSIS — Z94.4 S/P LIVER TRANSPLANT: ICD-10-CM

## 2022-05-23 PROBLEM — E66.9 CLASS 1 OBESITY WITH SERIOUS COMORBIDITY AND BODY MASS INDEX (BMI) OF 31.0 TO 31.9 IN ADULT: Status: RESOLVED | Noted: 2021-02-03 | Resolved: 2022-05-23

## 2022-05-23 PROBLEM — E66.811 CLASS 1 OBESITY WITH SERIOUS COMORBIDITY AND BODY MASS INDEX (BMI) OF 31.0 TO 31.9 IN ADULT: Status: RESOLVED | Noted: 2021-02-03 | Resolved: 2022-05-23

## 2022-05-23 LAB
ALBUMIN SERPL BCP-MCNC: 3.6 G/DL (ref 3.5–5.2)
ALP SERPL-CCNC: 68 U/L (ref 55–135)
ALT SERPL W/O P-5'-P-CCNC: 10 U/L (ref 10–44)
ANION GAP SERPL CALC-SCNC: 7 MMOL/L (ref 8–16)
ANISOCYTOSIS BLD QL SMEAR: SLIGHT
AST SERPL-CCNC: 18 U/L (ref 10–40)
BASO STIPL BLD QL SMEAR: ABNORMAL
BASOPHILS # BLD AUTO: 0.06 K/UL (ref 0–0.2)
BASOPHILS NFR BLD: 3.1 % (ref 0–1.9)
BILIRUB SERPL-MCNC: 0.5 MG/DL (ref 0.1–1)
BUN SERPL-MCNC: 14 MG/DL (ref 8–23)
CALCIUM SERPL-MCNC: 9.1 MG/DL (ref 8.7–10.5)
CHLORIDE SERPL-SCNC: 105 MMOL/L (ref 95–110)
CO2 SERPL-SCNC: 27 MMOL/L (ref 23–29)
CREAT SERPL-MCNC: 0.9 MG/DL (ref 0.5–1.4)
DIFFERENTIAL METHOD: ABNORMAL
EOSINOPHIL # BLD AUTO: 0.1 K/UL (ref 0–0.5)
EOSINOPHIL NFR BLD: 4.1 % (ref 0–8)
ERYTHROCYTE [DISTWIDTH] IN BLOOD BY AUTOMATED COUNT: 16.8 % (ref 11.5–14.5)
EST. GFR  (AFRICAN AMERICAN): >60 ML/MIN/1.73 M^2
EST. GFR  (NON AFRICAN AMERICAN): >60 ML/MIN/1.73 M^2
GLUCOSE SERPL-MCNC: 98 MG/DL (ref 70–110)
HCT VFR BLD AUTO: 34.2 % (ref 40–54)
HGB BLD-MCNC: 9.7 G/DL (ref 14–18)
HYPOCHROMIA BLD QL SMEAR: ABNORMAL
IMM GRANULOCYTES # BLD AUTO: 0 K/UL (ref 0–0.04)
IMM GRANULOCYTES NFR BLD AUTO: 0 % (ref 0–0.5)
LYMPHOCYTES # BLD AUTO: 1 K/UL (ref 1–4.8)
LYMPHOCYTES NFR BLD: 50.8 % (ref 18–48)
MAGNESIUM SERPL-MCNC: 1.6 MG/DL (ref 1.6–2.6)
MCH RBC QN AUTO: 21.1 PG (ref 27–31)
MCHC RBC AUTO-ENTMCNC: 28.4 G/DL (ref 32–36)
MCV RBC AUTO: 74 FL (ref 82–98)
MONOCYTES # BLD AUTO: 0.5 K/UL (ref 0.3–1)
MONOCYTES NFR BLD: 27.5 % (ref 4–15)
NEUTROPHILS # BLD AUTO: 0.3 K/UL (ref 1.8–7.7)
NEUTROPHILS NFR BLD: 14.5 % (ref 38–73)
NRBC BLD-RTO: 0 /100 WBC
OVALOCYTES BLD QL SMEAR: ABNORMAL
PLATELET # BLD AUTO: 160 K/UL (ref 150–450)
PLATELET BLD QL SMEAR: ABNORMAL
PMV BLD AUTO: 11.8 FL (ref 9.2–12.9)
POIKILOCYTOSIS BLD QL SMEAR: SLIGHT
POLYCHROMASIA BLD QL SMEAR: ABNORMAL
POTASSIUM SERPL-SCNC: 4.4 MMOL/L (ref 3.5–5.1)
PROT SERPL-MCNC: 6.5 G/DL (ref 6–8.4)
RBC # BLD AUTO: 4.6 M/UL (ref 4.6–6.2)
SODIUM SERPL-SCNC: 139 MMOL/L (ref 136–145)
TARGETS BLD QL SMEAR: ABNORMAL
WBC # BLD AUTO: 2 K/UL (ref 3.9–12.7)

## 2022-05-23 PROCEDURE — 3078F DIAST BP <80 MM HG: CPT | Mod: CPTII,S$GLB,, | Performed by: INTERNAL MEDICINE

## 2022-05-23 PROCEDURE — 1159F PR MEDICATION LIST DOCUMENTED IN MEDICAL RECORD: ICD-10-PCS | Mod: CPTII,S$GLB,, | Performed by: INTERNAL MEDICINE

## 2022-05-23 PROCEDURE — 3288F FALL RISK ASSESSMENT DOCD: CPT | Mod: CPTII,S$GLB,, | Performed by: INTERNAL MEDICINE

## 2022-05-23 PROCEDURE — 3078F PR MOST RECENT DIASTOLIC BLOOD PRESSURE < 80 MM HG: ICD-10-PCS | Mod: CPTII,S$GLB,, | Performed by: INTERNAL MEDICINE

## 2022-05-23 PROCEDURE — 3288F PR FALLS RISK ASSESSMENT DOCUMENTED: ICD-10-PCS | Mod: CPTII,S$GLB,, | Performed by: INTERNAL MEDICINE

## 2022-05-23 PROCEDURE — 1159F MED LIST DOCD IN RCRD: CPT | Mod: CPTII,S$GLB,, | Performed by: INTERNAL MEDICINE

## 2022-05-23 PROCEDURE — 1160F PR REVIEW ALL MEDS BY PRESCRIBER/CLIN PHARMACIST DOCUMENTED: ICD-10-PCS | Mod: CPTII,S$GLB,, | Performed by: INTERNAL MEDICINE

## 2022-05-23 PROCEDURE — 3008F BODY MASS INDEX DOCD: CPT | Mod: CPTII,S$GLB,, | Performed by: INTERNAL MEDICINE

## 2022-05-23 PROCEDURE — 3008F PR BODY MASS INDEX (BMI) DOCUMENTED: ICD-10-PCS | Mod: CPTII,S$GLB,, | Performed by: INTERNAL MEDICINE

## 2022-05-23 PROCEDURE — 1101F PR PT FALLS ASSESS DOC 0-1 FALLS W/OUT INJ PAST YR: ICD-10-PCS | Mod: CPTII,S$GLB,, | Performed by: INTERNAL MEDICINE

## 2022-05-23 PROCEDURE — 80197 ASSAY OF TACROLIMUS: CPT | Performed by: INTERNAL MEDICINE

## 2022-05-23 PROCEDURE — 1101F PT FALLS ASSESS-DOCD LE1/YR: CPT | Mod: CPTII,S$GLB,, | Performed by: INTERNAL MEDICINE

## 2022-05-23 PROCEDURE — 4010F ACE/ARB THERAPY RXD/TAKEN: CPT | Mod: CPTII,S$GLB,, | Performed by: INTERNAL MEDICINE

## 2022-05-23 PROCEDURE — 4010F PR ACE/ARB THEARPY RXD/TAKEN: ICD-10-PCS | Mod: CPTII,S$GLB,, | Performed by: INTERNAL MEDICINE

## 2022-05-23 PROCEDURE — 99214 OFFICE O/P EST MOD 30 MIN: CPT | Mod: S$GLB,,, | Performed by: INTERNAL MEDICINE

## 2022-05-23 PROCEDURE — 85025 COMPLETE CBC W/AUTO DIFF WBC: CPT | Performed by: INTERNAL MEDICINE

## 2022-05-23 PROCEDURE — 1160F RVW MEDS BY RX/DR IN RCRD: CPT | Mod: CPTII,S$GLB,, | Performed by: INTERNAL MEDICINE

## 2022-05-23 PROCEDURE — 1126F PR PAIN SEVERITY QUANTIFIED, NO PAIN PRESENT: ICD-10-PCS | Mod: CPTII,S$GLB,, | Performed by: INTERNAL MEDICINE

## 2022-05-23 PROCEDURE — 83735 ASSAY OF MAGNESIUM: CPT | Performed by: INTERNAL MEDICINE

## 2022-05-23 PROCEDURE — 80053 COMPREHEN METABOLIC PANEL: CPT | Performed by: INTERNAL MEDICINE

## 2022-05-23 PROCEDURE — 1126F AMNT PAIN NOTED NONE PRSNT: CPT | Mod: CPTII,S$GLB,, | Performed by: INTERNAL MEDICINE

## 2022-05-23 PROCEDURE — 36415 COLL VENOUS BLD VENIPUNCTURE: CPT | Mod: PN | Performed by: INTERNAL MEDICINE

## 2022-05-23 PROCEDURE — 3044F HG A1C LEVEL LT 7.0%: CPT | Mod: CPTII,S$GLB,, | Performed by: INTERNAL MEDICINE

## 2022-05-23 PROCEDURE — 3077F SYST BP >= 140 MM HG: CPT | Mod: CPTII,S$GLB,, | Performed by: INTERNAL MEDICINE

## 2022-05-23 PROCEDURE — 3044F PR MOST RECENT HEMOGLOBIN A1C LEVEL <7.0%: ICD-10-PCS | Mod: CPTII,S$GLB,, | Performed by: INTERNAL MEDICINE

## 2022-05-23 PROCEDURE — 99214 PR OFFICE/OUTPT VISIT, EST, LEVL IV, 30-39 MIN: ICD-10-PCS | Mod: S$GLB,,, | Performed by: INTERNAL MEDICINE

## 2022-05-23 PROCEDURE — 3077F PR MOST RECENT SYSTOLIC BLOOD PRESSURE >= 140 MM HG: ICD-10-PCS | Mod: CPTII,S$GLB,, | Performed by: INTERNAL MEDICINE

## 2022-05-23 NOTE — ASSESSMENT & PLAN NOTE
Reviewed goals of therapy - to get the best control we can without hypoglycemia. Goal a1c <7.5   - last a1c remains below goal   glucose log with AM sugar into 70s often    Medication changes:   Continue insulin, but decrease to 20 units.    Try to decrease humalog to once a day with his largest meal     Reviewed dose titration. Details in AVS     -Advised occasional self blood glucose monitoring. Patient encouraged to document glucose results and bring them to every clinic visit      -Hypoglycemia precautions discussed. Instructed on precautions before driving. Had discussed with liver disease, in theory higher risks associated with hypoglycemia if unable to mount normal gluconeogenesis response    -Close adherence to lifestyle changes recommended.    -Periodic follow ups for eye evaluations, foot care suggested.

## 2022-05-23 NOTE — PROGRESS NOTES
Subjective:      Chief Complaint: Diabetes    HPI: Tej Purdy is a 66 y.o. male who is here for a follow-up evaluation for diabetes. Last seen 11/17/2021    Of note, pt has been diagnosed with HCC. S/p TACE and RFA.   S/p liver transplant 1/2022. 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    Also had COVID later in Jan 2022    Also found to have afib (during evaluation for RHC as part of liver transplant evaluation). S/p 3x DCCV, now on amiodarone. On coumadin.    Has HTN. BP elevated today.     With regards to the diabetes:  Diagnosed with T2DM since around 2000.     Known complications:     Retinopathy? No    Nephropathy? No    Neuropathy? Yes    CAD? No    CVA? No     Current regimen:   Lantus 24 units qHS (previously was as high as 42 units before a hospitalization)   humalog 6 units plus scale (+2 if 160-230, +4 if above that)    Missed doses? denies     Prior treatments:    Glipizide 5 mg BID    Metformin 1,000 mg bid    Self-Monitored blood glucose.  # times a day testing: 3-4 times daily  Log reviewed: Yes, has log with him    Pre breakfast:   Pre-lunch:   After lunch: 120-160s  qHS: 120-150s    Hypoglycemic events? Not lately, though down to 70s pretty often    Current Symptoms  No   Yes  [x]    []  Polydipsia  []    [x]  Polyuria, chronic  [x]    []  Vision changes  [x]    []  Nausea  []    [x]  Diarrhea (occasional)  [x]    []  Weight gain  []    [x]  Weight loss    Some weakness after covid still. Sometimes dizziness/lightheadedness    Diabetes Management Status    Statin: Taking  ACE/ARB: Taking    Screening or Prevention Patient's value Goal Complete/Controlled?   HgA1C Testing and Control   Lab Results   Component Value Date    HGBA1C 5.8 (H) 05/17/2022      q6months/Less than 7.5% Yes   Lipid profile : 06/29/2021 Annually Yes   LDL control Lab Results   Component Value Date    LDLCALC 69.2 06/29/2021    Annually/Less than 100 mg/dl  Yes   Nephropathy screening  Lab Results   Component Value Date    MICALBCREAT 17.4 06/29/2021     Lab Results   Component Value Date    CREATININE 1.0 05/17/2022     Lab Results   Component Value Date    PROTEINUA Negative 02/02/2022    Annually Yes   Blood pressure BP Readings from Last 1 Encounters:   05/16/22 (!) 145/67    Less than 140/90 No   Dilated retinal exam Most Recent Eye Exam Date: Not Found Annually no   Foot exam   : 02/03/2021 Annually no     Reviewed past medical, family, social history and updated as appropriate.    Review of Systems  As above    Objective:     Vitals:    05/23/22 1507   BP: (!) 152/68   Pulse: 75     BP Readings from Last 5 Encounters:   05/16/22 (!) 145/67   04/20/22 124/61   04/05/22 (!) 123/59   04/05/22 (!) 120/57   04/01/22 (!) 108/59     Physical Exam  Vitals reviewed.   Constitutional:       General: He is not in acute distress.  Neck:      Thyroid: No thyromegaly.   Cardiovascular:      Rate and Rhythm: Regular rhythm.      Heart sounds: Murmur heard.   Pulmonary:      Effort: Pulmonary effort is normal.       Wt Readings from Last 10 Encounters:   05/23/22 1507 98 kg (216 lb)   05/16/22 1546 98.8 kg (217 lb 13 oz)   04/20/22 1446 100.6 kg (221 lb 12.5 oz)   04/11/22 1022 101.2 kg (223 lb 1.7 oz)   04/05/22 0946 101.2 kg (223 lb 1.7 oz)   04/01/22 1044 102.4 kg (225 lb 13.8 oz)   03/25/22 1345 106.6 kg (235 lb)   03/16/22 1514 106.9 kg (235 lb 12.5 oz)   02/17/22 1500 102.6 kg (226 lb 3.1 oz)   02/15/22 1417 102.6 kg (226 lb 3.1 oz)     Lab Results   Component Value Date    HGBA1C 5.8 (H) 05/17/2022     Lab Results   Component Value Date    CHOL 133 06/29/2021    HDL 32 (L) 06/29/2021    LDLCALC 69.2 06/29/2021    TRIG 159 (H) 06/29/2021    CHOLHDL 24.1 06/29/2021     Lab Results   Component Value Date     05/17/2022    K 4.2 05/17/2022     05/17/2022    CO2 26 05/17/2022    GLU 75 05/17/2022    BUN 14 05/17/2022    CREATININE 1.0 05/17/2022    CALCIUM 9.5 05/17/2022    PROT 6.3  05/13/2022    ALBUMIN 3.6 05/13/2022    BILITOT 0.4 05/13/2022    ALKPHOS 71 05/13/2022    AST 18 05/13/2022    ALT 12 05/13/2022    ANIONGAP 9 05/17/2022    ESTGFRAFRICA >60.0 05/17/2022    EGFRNONAA >60.0 05/17/2022    TSH 1.418 02/04/2022      Lab Results   Component Value Date    MICALBCREAT 17.4 06/29/2021       Assessment/Plan:     Type 2 diabetes mellitus  Reviewed goals of therapy - to get the best control we can without hypoglycemia. Goal a1c <7.5   - last a1c remains below goal   glucose log with AM sugar into 70s often    Medication changes:   Continue insulin, but decrease to 20 units.    Try to decrease humalog to once a day with his largest meal     Reviewed dose titration. Details in AVS     -Advised occasional self blood glucose monitoring. Patient encouraged to document glucose results and bring them to every clinic visit      -Hypoglycemia precautions discussed. Instructed on precautions before driving. Had discussed with liver disease, in theory higher risks associated with hypoglycemia if unable to mount normal gluconeogenesis response    -Close adherence to lifestyle changes recommended.    -Periodic follow ups for eye evaluations, foot care suggested.        Hyperlipidemia  Lab Results   Component Value Date    LDLCALC 69.2 06/29/2021     LDL at goal  Continue meds  Monitor yearly      Diabetic neuropathy   Encourage glycemic control as above   monitor      Follow up in about 4 months (around 9/23/2022) for lab review, further monitoring.      Carlos Argueta MD  Endocrinology

## 2022-05-23 NOTE — ASSESSMENT & PLAN NOTE
Lab Results   Component Value Date    LDLCALC 69.2 06/29/2021     LDL at goal  Continue meds  Monitor yearly

## 2022-05-23 NOTE — PATIENT INSTRUCTIONS
For the diabetes:    Decrease insulin doses    Basal Insulin  Take 20 units of lantus insulin.    Check your glucose in the morning before breakfast and keep a log.  Goal AM glucose:       After 3-5 days, if your average glucose is above 140, increase your dose by 2 units  If your AM glucose is under 90, decrease by 2 units.     Continue humalog, can try using just once a day with the largest meal of the day.

## 2022-05-24 ENCOUNTER — CLINICAL SUPPORT (OUTPATIENT)
Dept: TRANSPLANT | Facility: CLINIC | Age: 67
End: 2022-05-24
Payer: MEDICARE

## 2022-05-24 ENCOUNTER — TELEPHONE (OUTPATIENT)
Dept: TRANSPLANT | Facility: CLINIC | Age: 67
End: 2022-05-24
Payer: MEDICARE

## 2022-05-24 VITALS
WEIGHT: 217.13 LBS | BODY MASS INDEX: 29.41 KG/M2 | DIASTOLIC BLOOD PRESSURE: 65 MMHG | HEIGHT: 72 IN | SYSTOLIC BLOOD PRESSURE: 143 MMHG

## 2022-05-24 DIAGNOSIS — Z94.4 S/P LIVER TRANSPLANT: Primary | ICD-10-CM

## 2022-05-24 LAB — TACROLIMUS BLD-MCNC: 4 NG/ML (ref 5–15)

## 2022-05-24 PROCEDURE — 99999 PR PBB SHADOW E&M-EST. PATIENT-LVL I: CPT | Mod: PBBFAC,,,

## 2022-05-24 PROCEDURE — 99999 PR PBB SHADOW E&M-EST. PATIENT-LVL I: ICD-10-PCS | Mod: PBBFAC,,,

## 2022-05-24 NOTE — TELEPHONE ENCOUNTER
Sent secure chat to Dr Maxwell patient's ANC is 290 from yesterday's lab.  Called to have his stop Valcyte and Bactrim.  He will get Neupogen today.

## 2022-05-24 NOTE — PROGRESS NOTES
Zarxio 480 mcg given per MD order to right posterior arm. Patient tolerated with no complaints. Patient instructed on possible side effects of this medication including pain. Verbalized understanding.

## 2022-05-26 ENCOUNTER — LAB VISIT (OUTPATIENT)
Dept: LAB | Facility: HOSPITAL | Age: 67
End: 2022-05-26
Attending: INTERNAL MEDICINE
Payer: MEDICARE

## 2022-05-26 DIAGNOSIS — Z94.4 S/P LIVER TRANSPLANT: ICD-10-CM

## 2022-05-26 LAB
BASOPHILS # BLD AUTO: 0.11 K/UL (ref 0–0.2)
BASOPHILS NFR BLD: 0.8 % (ref 0–1.9)
DIFFERENTIAL METHOD: ABNORMAL
EOSINOPHIL # BLD AUTO: 0.1 K/UL (ref 0–0.5)
EOSINOPHIL NFR BLD: 0.9 % (ref 0–8)
ERYTHROCYTE [DISTWIDTH] IN BLOOD BY AUTOMATED COUNT: 16.9 % (ref 11.5–14.5)
HCT VFR BLD AUTO: 34.4 % (ref 40–54)
HGB BLD-MCNC: 9.6 G/DL (ref 14–18)
IMM GRANULOCYTES # BLD AUTO: 0.32 K/UL (ref 0–0.04)
IMM GRANULOCYTES NFR BLD AUTO: 2.3 % (ref 0–0.5)
LYMPHOCYTES # BLD AUTO: 1.2 K/UL (ref 1–4.8)
LYMPHOCYTES NFR BLD: 8.6 % (ref 18–48)
MCH RBC QN AUTO: 21.6 PG (ref 27–31)
MCHC RBC AUTO-ENTMCNC: 27.9 G/DL (ref 32–36)
MCV RBC AUTO: 78 FL (ref 82–98)
MONOCYTES # BLD AUTO: 1 K/UL (ref 0.3–1)
MONOCYTES NFR BLD: 7.1 % (ref 4–15)
NEUTROPHILS # BLD AUTO: 11.1 K/UL (ref 1.8–7.7)
NEUTROPHILS NFR BLD: 80.3 % (ref 38–73)
NRBC BLD-RTO: 0 /100 WBC
PLATELET # BLD AUTO: 183 K/UL (ref 150–450)
PMV BLD AUTO: 11.9 FL (ref 9.2–12.9)
RBC # BLD AUTO: 4.44 M/UL (ref 4.6–6.2)
WBC # BLD AUTO: 13.88 K/UL (ref 3.9–12.7)

## 2022-05-26 PROCEDURE — 85025 COMPLETE CBC W/AUTO DIFF WBC: CPT | Performed by: INTERNAL MEDICINE

## 2022-05-26 PROCEDURE — 36415 COLL VENOUS BLD VENIPUNCTURE: CPT | Mod: PN | Performed by: INTERNAL MEDICINE

## 2022-05-31 ENCOUNTER — PATIENT MESSAGE (OUTPATIENT)
Dept: TRANSPLANT | Facility: CLINIC | Age: 67
End: 2022-05-31
Payer: MEDICARE

## 2022-06-01 ENCOUNTER — LAB VISIT (OUTPATIENT)
Dept: LAB | Facility: HOSPITAL | Age: 67
End: 2022-06-01
Attending: INTERNAL MEDICINE
Payer: MEDICARE

## 2022-06-01 DIAGNOSIS — E83.42 HYPOMAGNESEMIA: ICD-10-CM

## 2022-06-01 DIAGNOSIS — Z94.4 S/P LIVER TRANSPLANT: ICD-10-CM

## 2022-06-01 LAB
ALBUMIN SERPL BCP-MCNC: 3.6 G/DL (ref 3.5–5.2)
ALP SERPL-CCNC: 71 U/L (ref 55–135)
ALT SERPL W/O P-5'-P-CCNC: 13 U/L (ref 10–44)
ANION GAP SERPL CALC-SCNC: 9 MMOL/L (ref 8–16)
AST SERPL-CCNC: 21 U/L (ref 10–40)
BASOPHILS # BLD AUTO: 0.07 K/UL (ref 0–0.2)
BASOPHILS NFR BLD: 1.6 % (ref 0–1.9)
BILIRUB SERPL-MCNC: 0.6 MG/DL (ref 0.1–1)
BUN SERPL-MCNC: 16 MG/DL (ref 8–23)
CALCIUM SERPL-MCNC: 9.1 MG/DL (ref 8.7–10.5)
CHLORIDE SERPL-SCNC: 102 MMOL/L (ref 95–110)
CO2 SERPL-SCNC: 27 MMOL/L (ref 23–29)
CREAT SERPL-MCNC: 0.9 MG/DL (ref 0.5–1.4)
DIFFERENTIAL METHOD: ABNORMAL
EOSINOPHIL # BLD AUTO: 0.1 K/UL (ref 0–0.5)
EOSINOPHIL NFR BLD: 1.8 % (ref 0–8)
ERYTHROCYTE [DISTWIDTH] IN BLOOD BY AUTOMATED COUNT: 17.6 % (ref 11.5–14.5)
EST. GFR  (AFRICAN AMERICAN): >60 ML/MIN/1.73 M^2
EST. GFR  (NON AFRICAN AMERICAN): >60 ML/MIN/1.73 M^2
GLUCOSE SERPL-MCNC: 82 MG/DL (ref 70–110)
HCT VFR BLD AUTO: 34.4 % (ref 40–54)
HGB BLD-MCNC: 9.6 G/DL (ref 14–18)
IMM GRANULOCYTES # BLD AUTO: 0.05 K/UL (ref 0–0.04)
IMM GRANULOCYTES NFR BLD AUTO: 1.1 % (ref 0–0.5)
LYMPHOCYTES # BLD AUTO: 0.8 K/UL (ref 1–4.8)
LYMPHOCYTES NFR BLD: 18.4 % (ref 18–48)
MAGNESIUM SERPL-MCNC: 1.7 MG/DL (ref 1.6–2.6)
MCH RBC QN AUTO: 21.9 PG (ref 27–31)
MCHC RBC AUTO-ENTMCNC: 27.9 G/DL (ref 32–36)
MCV RBC AUTO: 78 FL (ref 82–98)
MONOCYTES # BLD AUTO: 0.8 K/UL (ref 0.3–1)
MONOCYTES NFR BLD: 18.6 % (ref 4–15)
NEUTROPHILS # BLD AUTO: 2.6 K/UL (ref 1.8–7.7)
NEUTROPHILS NFR BLD: 58.5 % (ref 38–73)
NRBC BLD-RTO: 0 /100 WBC
PLATELET # BLD AUTO: 109 K/UL (ref 150–450)
PMV BLD AUTO: 11.2 FL (ref 9.2–12.9)
POTASSIUM SERPL-SCNC: 4.1 MMOL/L (ref 3.5–5.1)
PROT SERPL-MCNC: 6.5 G/DL (ref 6–8.4)
RBC # BLD AUTO: 4.39 M/UL (ref 4.6–6.2)
SODIUM SERPL-SCNC: 138 MMOL/L (ref 136–145)
WBC # BLD AUTO: 4.4 K/UL (ref 3.9–12.7)

## 2022-06-01 PROCEDURE — 36415 COLL VENOUS BLD VENIPUNCTURE: CPT | Mod: PN | Performed by: INTERNAL MEDICINE

## 2022-06-01 PROCEDURE — 83735 ASSAY OF MAGNESIUM: CPT | Performed by: INTERNAL MEDICINE

## 2022-06-01 PROCEDURE — 85025 COMPLETE CBC W/AUTO DIFF WBC: CPT | Performed by: INTERNAL MEDICINE

## 2022-06-01 PROCEDURE — 80053 COMPREHEN METABOLIC PANEL: CPT | Performed by: INTERNAL MEDICINE

## 2022-06-01 PROCEDURE — 80197 ASSAY OF TACROLIMUS: CPT | Performed by: INTERNAL MEDICINE

## 2022-06-02 LAB — TACROLIMUS BLD-MCNC: 3.2 NG/ML (ref 5–15)

## 2022-06-02 RX ORDER — ZOLPIDEM TARTRATE 5 MG/1
5 TABLET ORAL NIGHTLY PRN
Qty: 20 TABLET | Refills: 0 | OUTPATIENT
Start: 2022-06-02 | End: 2022-12-01

## 2022-06-07 ENCOUNTER — PATIENT MESSAGE (OUTPATIENT)
Dept: TRANSPLANT | Facility: CLINIC | Age: 67
End: 2022-06-07
Payer: MEDICARE

## 2022-06-07 ENCOUNTER — TELEPHONE (OUTPATIENT)
Dept: TRANSPLANT | Facility: CLINIC | Age: 67
End: 2022-06-07
Payer: MEDICARE

## 2022-06-07 NOTE — TELEPHONE ENCOUNTER
Sent patient message via portal to let him know labs are stable.  No medication changes, next labs due on 06/13/22      ----- Message from Thais Maxwell MD sent at 6/5/2022 12:21 PM CDT -----  The Labs are stable - please let patient know.

## 2022-06-13 ENCOUNTER — LAB VISIT (OUTPATIENT)
Dept: LAB | Facility: HOSPITAL | Age: 67
End: 2022-06-13
Attending: INTERNAL MEDICINE
Payer: MEDICARE

## 2022-06-13 DIAGNOSIS — Z94.4 S/P LIVER TRANSPLANT: ICD-10-CM

## 2022-06-13 DIAGNOSIS — E83.42 HYPOMAGNESEMIA: ICD-10-CM

## 2022-06-13 LAB
ALBUMIN SERPL BCP-MCNC: 3.4 G/DL (ref 3.5–5.2)
ALP SERPL-CCNC: 82 U/L (ref 55–135)
ALT SERPL W/O P-5'-P-CCNC: 31 U/L (ref 10–44)
ANION GAP SERPL CALC-SCNC: 11 MMOL/L (ref 8–16)
AST SERPL-CCNC: 42 U/L (ref 10–40)
BASOPHILS # BLD AUTO: 0.08 K/UL (ref 0–0.2)
BASOPHILS NFR BLD: 1.9 % (ref 0–1.9)
BILIRUB SERPL-MCNC: 0.7 MG/DL (ref 0.1–1)
BUN SERPL-MCNC: 14 MG/DL (ref 8–23)
CALCIUM SERPL-MCNC: 8.9 MG/DL (ref 8.7–10.5)
CHLORIDE SERPL-SCNC: 104 MMOL/L (ref 95–110)
CO2 SERPL-SCNC: 24 MMOL/L (ref 23–29)
CREAT SERPL-MCNC: 0.9 MG/DL (ref 0.5–1.4)
DIFFERENTIAL METHOD: ABNORMAL
EOSINOPHIL # BLD AUTO: 0.1 K/UL (ref 0–0.5)
EOSINOPHIL NFR BLD: 3.3 % (ref 0–8)
ERYTHROCYTE [DISTWIDTH] IN BLOOD BY AUTOMATED COUNT: 18.4 % (ref 11.5–14.5)
EST. GFR  (AFRICAN AMERICAN): >60 ML/MIN/1.73 M^2
EST. GFR  (NON AFRICAN AMERICAN): >60 ML/MIN/1.73 M^2
GLUCOSE SERPL-MCNC: 118 MG/DL (ref 70–110)
HCT VFR BLD AUTO: 36.2 % (ref 40–54)
HGB BLD-MCNC: 10 G/DL (ref 14–18)
IMM GRANULOCYTES # BLD AUTO: 0.02 K/UL (ref 0–0.04)
IMM GRANULOCYTES NFR BLD AUTO: 0.5 % (ref 0–0.5)
LYMPHOCYTES # BLD AUTO: 0.7 K/UL (ref 1–4.8)
LYMPHOCYTES NFR BLD: 16.7 % (ref 18–48)
MAGNESIUM SERPL-MCNC: 1.5 MG/DL (ref 1.6–2.6)
MCH RBC QN AUTO: 21.8 PG (ref 27–31)
MCHC RBC AUTO-ENTMCNC: 27.6 G/DL (ref 32–36)
MCV RBC AUTO: 79 FL (ref 82–98)
MONOCYTES # BLD AUTO: 0.8 K/UL (ref 0.3–1)
MONOCYTES NFR BLD: 19.6 % (ref 4–15)
NEUTROPHILS # BLD AUTO: 2.4 K/UL (ref 1.8–7.7)
NEUTROPHILS NFR BLD: 58 % (ref 38–73)
NRBC BLD-RTO: 0 /100 WBC
PLATELET # BLD AUTO: 171 K/UL (ref 150–450)
PMV BLD AUTO: 11.6 FL (ref 9.2–12.9)
POTASSIUM SERPL-SCNC: 4.1 MMOL/L (ref 3.5–5.1)
PROT SERPL-MCNC: 6.4 G/DL (ref 6–8.4)
RBC # BLD AUTO: 4.58 M/UL (ref 4.6–6.2)
SODIUM SERPL-SCNC: 139 MMOL/L (ref 136–145)
WBC # BLD AUTO: 4.19 K/UL (ref 3.9–12.7)

## 2022-06-13 PROCEDURE — 80197 ASSAY OF TACROLIMUS: CPT | Performed by: INTERNAL MEDICINE

## 2022-06-13 PROCEDURE — 85025 COMPLETE CBC W/AUTO DIFF WBC: CPT | Performed by: INTERNAL MEDICINE

## 2022-06-13 PROCEDURE — 80053 COMPREHEN METABOLIC PANEL: CPT | Performed by: INTERNAL MEDICINE

## 2022-06-13 PROCEDURE — 83735 ASSAY OF MAGNESIUM: CPT | Performed by: INTERNAL MEDICINE

## 2022-06-13 PROCEDURE — 36415 COLL VENOUS BLD VENIPUNCTURE: CPT | Mod: PN | Performed by: INTERNAL MEDICINE

## 2022-06-14 LAB — TACROLIMUS BLD-MCNC: 3.6 NG/ML (ref 5–15)

## 2022-06-15 ENCOUNTER — PATIENT MESSAGE (OUTPATIENT)
Dept: TRANSPLANT | Facility: CLINIC | Age: 67
End: 2022-06-15
Payer: MEDICARE

## 2022-06-15 DIAGNOSIS — E83.42 HYPOMAGNESEMIA: ICD-10-CM

## 2022-06-15 RX ORDER — LANOLIN ALCOHOL/MO/W.PET/CERES
CREAM (GRAM) TOPICAL
Qty: 120 TABLET | Refills: 6 | Status: SHIPPED | OUTPATIENT
Start: 2022-06-15 | End: 2022-06-30

## 2022-06-15 NOTE — TELEPHONE ENCOUNTER
Sent patient to let him know about increase in Mag.    ----- Message from Thais Maxwell MD sent at 6/14/2022  9:02 PM CDT -----  The Labs are stable; increase mg oxdide to 800 in am, 400 mg at lunch, 400 mg in the eveing- please let patient know.

## 2022-06-20 ENCOUNTER — LAB VISIT (OUTPATIENT)
Dept: LAB | Facility: HOSPITAL | Age: 67
End: 2022-06-20
Attending: INTERNAL MEDICINE
Payer: MEDICARE

## 2022-06-20 ENCOUNTER — OFFICE VISIT (OUTPATIENT)
Dept: TRANSPLANT | Facility: CLINIC | Age: 67
End: 2022-06-20
Payer: MEDICARE

## 2022-06-20 VITALS
SYSTOLIC BLOOD PRESSURE: 187 MMHG | TEMPERATURE: 98 F | BODY MASS INDEX: 30.95 KG/M2 | HEIGHT: 72 IN | OXYGEN SATURATION: 96 % | RESPIRATION RATE: 17 BRPM | HEART RATE: 80 BPM | WEIGHT: 228.5 LBS | DIASTOLIC BLOOD PRESSURE: 80 MMHG

## 2022-06-20 DIAGNOSIS — Z29.89 PROPHYLACTIC IMMUNOTHERAPY: ICD-10-CM

## 2022-06-20 DIAGNOSIS — Z94.4 S/P LIVER TRANSPLANT: ICD-10-CM

## 2022-06-20 DIAGNOSIS — C22.1 CHOLANGIOCARCINOMA: ICD-10-CM

## 2022-06-20 DIAGNOSIS — R53.83 FATIGUE, UNSPECIFIED TYPE: Primary | ICD-10-CM

## 2022-06-20 DIAGNOSIS — R18.8 OTHER ASCITES: ICD-10-CM

## 2022-06-20 DIAGNOSIS — Z76.89 ENCOUNTER TO ESTABLISH CARE: ICD-10-CM

## 2022-06-20 DIAGNOSIS — E83.42 HYPOMAGNESEMIA: ICD-10-CM

## 2022-06-20 DIAGNOSIS — Z79.60 LONG-TERM USE OF IMMUNOSUPPRESSANT MEDICATION: ICD-10-CM

## 2022-06-20 DIAGNOSIS — R53.1 WEAKNESS: ICD-10-CM

## 2022-06-20 LAB
ALBUMIN SERPL BCP-MCNC: 3.4 G/DL (ref 3.5–5.2)
ALP SERPL-CCNC: 88 U/L (ref 55–135)
ALT SERPL W/O P-5'-P-CCNC: 27 U/L (ref 10–44)
ANION GAP SERPL CALC-SCNC: 6 MMOL/L (ref 8–16)
AST SERPL-CCNC: 38 U/L (ref 10–40)
BASOPHILS # BLD AUTO: 0.05 K/UL (ref 0–0.2)
BASOPHILS NFR BLD: 1 % (ref 0–1.9)
BILIRUB SERPL-MCNC: 0.5 MG/DL (ref 0.1–1)
BUN SERPL-MCNC: 17 MG/DL (ref 8–23)
CALCIUM SERPL-MCNC: 9.3 MG/DL (ref 8.7–10.5)
CHLORIDE SERPL-SCNC: 105 MMOL/L (ref 95–110)
CO2 SERPL-SCNC: 29 MMOL/L (ref 23–29)
CREAT SERPL-MCNC: 1 MG/DL (ref 0.5–1.4)
DIFFERENTIAL METHOD: ABNORMAL
EOSINOPHIL # BLD AUTO: 0.2 K/UL (ref 0–0.5)
EOSINOPHIL NFR BLD: 2.9 % (ref 0–8)
ERYTHROCYTE [DISTWIDTH] IN BLOOD BY AUTOMATED COUNT: 18.8 % (ref 11.5–14.5)
EST. GFR  (AFRICAN AMERICAN): >60 ML/MIN/1.73 M^2
EST. GFR  (NON AFRICAN AMERICAN): >60 ML/MIN/1.73 M^2
GLUCOSE SERPL-MCNC: 131 MG/DL (ref 70–110)
HCT VFR BLD AUTO: 35.3 % (ref 40–54)
HGB BLD-MCNC: 9.9 G/DL (ref 14–18)
IMM GRANULOCYTES # BLD AUTO: 0.01 K/UL (ref 0–0.04)
IMM GRANULOCYTES NFR BLD AUTO: 0.2 % (ref 0–0.5)
LYMPHOCYTES # BLD AUTO: 0.6 K/UL (ref 1–4.8)
LYMPHOCYTES NFR BLD: 12.2 % (ref 18–48)
MAGNESIUM SERPL-MCNC: 1.7 MG/DL (ref 1.6–2.6)
MCH RBC QN AUTO: 21.4 PG (ref 27–31)
MCHC RBC AUTO-ENTMCNC: 28 G/DL (ref 32–36)
MCV RBC AUTO: 76 FL (ref 82–98)
MONOCYTES # BLD AUTO: 0.8 K/UL (ref 0.3–1)
MONOCYTES NFR BLD: 15.2 % (ref 4–15)
NEUTROPHILS # BLD AUTO: 3.6 K/UL (ref 1.8–7.7)
NEUTROPHILS NFR BLD: 68.5 % (ref 38–73)
NRBC BLD-RTO: 0 /100 WBC
PLATELET # BLD AUTO: 150 K/UL (ref 150–450)
PMV BLD AUTO: 10.5 FL (ref 9.2–12.9)
POTASSIUM SERPL-SCNC: 4.2 MMOL/L (ref 3.5–5.1)
PROT SERPL-MCNC: 6.3 G/DL (ref 6–8.4)
RBC # BLD AUTO: 4.62 M/UL (ref 4.6–6.2)
SODIUM SERPL-SCNC: 140 MMOL/L (ref 136–145)
WBC # BLD AUTO: 5.25 K/UL (ref 3.9–12.7)

## 2022-06-20 PROCEDURE — 99999 PR PBB SHADOW E&M-EST. PATIENT-LVL V: ICD-10-PCS | Mod: PBBFAC,,, | Performed by: INTERNAL MEDICINE

## 2022-06-20 PROCEDURE — 1101F PR PT FALLS ASSESS DOC 0-1 FALLS W/OUT INJ PAST YR: ICD-10-PCS | Mod: CPTII,S$GLB,, | Performed by: INTERNAL MEDICINE

## 2022-06-20 PROCEDURE — 3008F PR BODY MASS INDEX (BMI) DOCUMENTED: ICD-10-PCS | Mod: CPTII,S$GLB,, | Performed by: INTERNAL MEDICINE

## 2022-06-20 PROCEDURE — 3079F PR MOST RECENT DIASTOLIC BLOOD PRESSURE 80-89 MM HG: ICD-10-PCS | Mod: CPTII,S$GLB,, | Performed by: INTERNAL MEDICINE

## 2022-06-20 PROCEDURE — 3044F PR MOST RECENT HEMOGLOBIN A1C LEVEL <7.0%: ICD-10-PCS | Mod: CPTII,S$GLB,, | Performed by: INTERNAL MEDICINE

## 2022-06-20 PROCEDURE — 3079F DIAST BP 80-89 MM HG: CPT | Mod: CPTII,S$GLB,, | Performed by: INTERNAL MEDICINE

## 2022-06-20 PROCEDURE — 99214 OFFICE O/P EST MOD 30 MIN: CPT | Mod: S$GLB,,, | Performed by: INTERNAL MEDICINE

## 2022-06-20 PROCEDURE — 1125F PR PAIN SEVERITY QUANTIFIED, PAIN PRESENT: ICD-10-PCS | Mod: CPTII,S$GLB,, | Performed by: INTERNAL MEDICINE

## 2022-06-20 PROCEDURE — 3077F PR MOST RECENT SYSTOLIC BLOOD PRESSURE >= 140 MM HG: ICD-10-PCS | Mod: CPTII,S$GLB,, | Performed by: INTERNAL MEDICINE

## 2022-06-20 PROCEDURE — 80053 COMPREHEN METABOLIC PANEL: CPT | Performed by: INTERNAL MEDICINE

## 2022-06-20 PROCEDURE — 3008F BODY MASS INDEX DOCD: CPT | Mod: CPTII,S$GLB,, | Performed by: INTERNAL MEDICINE

## 2022-06-20 PROCEDURE — 83735 ASSAY OF MAGNESIUM: CPT | Performed by: INTERNAL MEDICINE

## 2022-06-20 PROCEDURE — 3044F HG A1C LEVEL LT 7.0%: CPT | Mod: CPTII,S$GLB,, | Performed by: INTERNAL MEDICINE

## 2022-06-20 PROCEDURE — 1159F PR MEDICATION LIST DOCUMENTED IN MEDICAL RECORD: ICD-10-PCS | Mod: CPTII,S$GLB,, | Performed by: INTERNAL MEDICINE

## 2022-06-20 PROCEDURE — 3288F FALL RISK ASSESSMENT DOCD: CPT | Mod: CPTII,S$GLB,, | Performed by: INTERNAL MEDICINE

## 2022-06-20 PROCEDURE — 1101F PT FALLS ASSESS-DOCD LE1/YR: CPT | Mod: CPTII,S$GLB,, | Performed by: INTERNAL MEDICINE

## 2022-06-20 PROCEDURE — 4010F PR ACE/ARB THEARPY RXD/TAKEN: ICD-10-PCS | Mod: CPTII,S$GLB,, | Performed by: INTERNAL MEDICINE

## 2022-06-20 PROCEDURE — 3077F SYST BP >= 140 MM HG: CPT | Mod: CPTII,S$GLB,, | Performed by: INTERNAL MEDICINE

## 2022-06-20 PROCEDURE — 36415 COLL VENOUS BLD VENIPUNCTURE: CPT | Mod: PN | Performed by: INTERNAL MEDICINE

## 2022-06-20 PROCEDURE — 80197 ASSAY OF TACROLIMUS: CPT | Performed by: INTERNAL MEDICINE

## 2022-06-20 PROCEDURE — 3288F PR FALLS RISK ASSESSMENT DOCUMENTED: ICD-10-PCS | Mod: CPTII,S$GLB,, | Performed by: INTERNAL MEDICINE

## 2022-06-20 PROCEDURE — 99999 PR PBB SHADOW E&M-EST. PATIENT-LVL V: CPT | Mod: PBBFAC,,, | Performed by: INTERNAL MEDICINE

## 2022-06-20 PROCEDURE — 85025 COMPLETE CBC W/AUTO DIFF WBC: CPT | Performed by: INTERNAL MEDICINE

## 2022-06-20 PROCEDURE — 4010F ACE/ARB THERAPY RXD/TAKEN: CPT | Mod: CPTII,S$GLB,, | Performed by: INTERNAL MEDICINE

## 2022-06-20 PROCEDURE — 1159F MED LIST DOCD IN RCRD: CPT | Mod: CPTII,S$GLB,, | Performed by: INTERNAL MEDICINE

## 2022-06-20 PROCEDURE — 1125F AMNT PAIN NOTED PAIN PRSNT: CPT | Mod: CPTII,S$GLB,, | Performed by: INTERNAL MEDICINE

## 2022-06-20 PROCEDURE — 99214 PR OFFICE/OUTPT VISIT, EST, LEVL IV, 30-39 MIN: ICD-10-PCS | Mod: S$GLB,,, | Performed by: INTERNAL MEDICINE

## 2022-06-20 RX ORDER — ZOLPIDEM TARTRATE 5 MG/1
5 TABLET ORAL NIGHTLY PRN
Qty: 30 TABLET | Refills: 0 | Status: SHIPPED | OUTPATIENT
Start: 2022-06-20 | End: 2023-12-11

## 2022-06-20 RX ORDER — FUROSEMIDE 40 MG/1
40 TABLET ORAL DAILY
Qty: 30 TABLET | Refills: 11 | Status: SHIPPED | OUTPATIENT
Start: 2022-06-20 | End: 2022-09-12 | Stop reason: SDUPTHER

## 2022-06-20 RX ORDER — FUROSEMIDE 40 MG/1
40 TABLET ORAL 2 TIMES DAILY
Qty: 60 TABLET | Refills: 11 | Status: SHIPPED | OUTPATIENT
Start: 2022-06-20 | End: 2022-06-20 | Stop reason: SDUPTHER

## 2022-06-20 NOTE — PROGRESS NOTES
Transplant Hepatology  Liver Transplant Recipient Follow-up    Transplant Date: 1/6/2022  UNOS Native Liver Dx: Primary Liver Malignancy: Hepatoma (HCC) and Cirrhosis    eTj is here for follow up of his liver transplant.    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     Subjective:     Interval History: Tej is now almost 6 months post liver transplant. Currently, he overall much better. He has better energy and is less fatigued.    Ascites post liver tx: He has undergone evaluation of ascites:  --Paracentesis 4/5/22: 2.9 L; albumin 2.3, protein 3.2, peripheral albumin 3.4; SAAG 3.4-2.3= 1.1; c/w cardiac ascites  --Paracentesis 3/22/22: 2.6 L, not infected; not sent for albumin or protein (he did have a 4.4 L paracentesis during a recent readmission); repeat paracentesis scheduled   --US abdo with doppler 4/4/22: Interval increase in resistive indices now normal to upper normal/mildly elevated.  Recommend follow-up ultrasound; Mild ascites about the liver, stable to minimally increased compared to prior exam; Right pleural effusion.  Abdo US with doppler 2/10/22: Satisfactory Doppler evaluation of the liver allograft.  Intrahepatic arterial resistive indices have normalized  --2D echo 3/25/22: EF 60%; mild RV enlargement with normal RVSP; grade II diastolic dysfunction; severe atrial enlargement; mod-severe aortic stenosis; PAP 41 mm Hg; normal central venous pressure (3 mmHg); BNP 4/11/22: 91  CXR  3 18/22: Normal heart size.  Arch calcification.  Normal pulmonary vasculature.  Interval developed trace-small left pleural effusion.  No left lung infiltrate and no left pneumothorax.  As was noted on the earlier CT, elevated posterior right hemidiaphragm.  Interval developed trace-small right pleural effusion and minimal right basilar infiltrate likely compressive atelectatic change.  No right pneumothorax.  --lasix: now off;  CT abdo/pelvis wo contrast 5/13/22: mod ascites    --had covid early 01/26/22-received monoclonal antibody    Six minute walk test 4/11/22: Six minute walk distance is 152.4 meters (500 feet) with somewhat heavy dyspnea.  During exercise, there was no significant desaturation while breathing room air.  Both blood pressure and heart rate remained stable with walking.  The patient reported non-pulmonary symptoms during exercise.  Significant exercise impairment is likely due to subjective symptoms.  The patient did complete the study, walking 340 seconds of the 360 second test.  No previous study performed.  Based upon age and body mass index, exercise capacity is less than predicted.       O2 Sat % Supplemental Oxygen Heart Rate Blood Pressure Jacqueline Scale   Pre-exercise  (Resting) 97 % Room Air 80 bpm 115/55 mmHg 0.5   During Exercise 97 % Room Air 104 bpm 117/57 mmHg 4   Post-exercise  (Recovery) 97 % Room Air  96 bpm   mmHg          Allograft function 6/13/22: ALT 31, AST 42, ALKP 56, Tbil 0.4, creat 0.9, prograf level 3.6  IS: prograf and cellcept 500/500  Immunoprophylaxis:  PCP PROPHYLAXIS: Bactrim 7/7/2022  CMV PROPHYLAXIS: valcyte until 7/7/2022; CMV negative 3/21/22  FUNGAL PROPHYLAXISL nystatin completed  Aspirin: YES/NO(WHY) DOSE: 81 mg   Apixaban for AFIB      Hosp 2/1/22-2/6/22: Mr. Purdy was admitted 2/1 for increasing weakness. Labs on admission revealed a h/h of 5.5/17.9. Has received 2 units of PRBCs as of 2/2, h/h responded appropriately. ASA and elliquis held. CT abd/pelvis  was attained to assess for hemorrhage. CT revealed new moderate abdominopelvic ascites that appears slightly complex in the pelvis. Para 2/3 with 4.5L of bloody fluid drained, labs with some concern for SBP, cefepime started. Occult blood stool negative. Cardiac w/u without acute findings, infectious w/u with BCs NGTD, clean UA. CXR with no acute cardiopulmonary disease. Transitioned to cefpoxodine  on 2/5 for 7 days     Hx cholangiocarcinoma: saw oncology: Given his relatively poor performance status, anemia and the marginal survival benefit of adjuvant chemotherapy even in non-transplant patients, I do not think that the benefits of chemotherapy outweigh the risk for Mr. Purdy. He is in agreement with this plan.  We will instead proceed with surveillance.  Plan for imaging every 3 months.  Last ct wo contrast 5/22: no obvious cholangio  CA 19-9 4/4/22: <2.1    Review of Systems   Constitutional: Positive for fatigue.   HENT: Negative.    Eyes: Negative.    Respiratory: Negative.    Cardiovascular: Negative.    Gastrointestinal: Positive for abdominal distention.   Genitourinary: Negative.    Musculoskeletal: Negative.    Skin: Negative.    Neurological: Positive for weakness.   Psychiatric/Behavioral: Negative.        Objective:     Vitals:    06/20/22 1614   BP: (!) 187/80   Pulse: 80   Resp: 17   Temp: 98.3 °F (36.8 °C)       Physical Exam  Vitals reviewed.   Constitutional:       Appearance: He is well-developed.   HENT:      Head: Normocephalic and atraumatic.   Eyes:      General: No scleral icterus.     Conjunctiva/sclera: Conjunctivae normal.      Pupils: Pupils are equal, round, and reactive to light.   Neck:      Thyroid: No thyromegaly.   Cardiovascular:      Rate and Rhythm: Normal rate and regular rhythm.      Heart sounds: Normal heart sounds.   Pulmonary:      Effort: Pulmonary effort is normal.      Breath sounds: Normal breath sounds. No rales.   Abdominal:      General: Bowel sounds are normal.  There is distension (+dullness to percussion).      Palpations: Abdomen is soft. There is no mass.      Tenderness: There is no abdominal tenderness.   Musculoskeletal:         General: Normal range of motion.      Cervical back: Normal range of motion and neck supple.   Skin:     General: Skin is warm and dry.      Findings: No rash.   Neurological:      Mental Status: He is alert and oriented to person, place, and time.       Lab Results   Component Value Date    BILITOT 0.7 06/13/2022    AST 42 (H) 06/13/2022    ALT 31 06/13/2022    ALKPHOS 82 06/13/2022    CREATININE 0.9 06/13/2022    ALBUMIN 3.4 (L) 06/13/2022     Lab Results   Component Value Date    WBC 4.19 06/13/2022    HGB 10.0 (L) 06/13/2022    HCT 36.2 (L) 06/13/2022    HCT 33 (L) 01/06/2022     06/13/2022     Lab Results   Component Value Date    TACROLIMUS 3.6 (L) 06/13/2022       Assessment/Plan:   The patient is a 66 year old male who is now almost 6 months post liver transplant. He was feeling better but now c/o weakness, abdominal distension- blames it on covid    His post transplant course has been complicated by a covid infection and signifcant anemia requiring 2 units of PRBC and ascites. W/u for ascites suggestive of cardiac ascites, but echo not significant for heart failure. Current recommendations:  1. S/p liver transplant and control of IS: good allograft function, although most recent liver tests are up a bit- will repeat this week;  prograf target 3-5 continue cellcept; labs this week and then every 2 weeks unless liver enzymes rising  2. Immunoprophylaxis:  PCP PROPHYLAXIS: Bactrim 7/7/2022  CMV PROPHYLAXIS: valcyte until 7/7/2022  FUNGAL PROPHYLAXISL nystatin  Aspirin: YES/NO(WHY) DOSE: 81 mg   *Apixaban for AFIB*  3. Cholangiocarcinoma: chemotx deferred; surveillance imaging and cea/ca19-9 every 3 months  4. Fatigue and weakness: etology unclear: monitor; f/u with cardiology   5. Ascites, large: paracentesis; check albumin and  protein and cell count on fluid; restart lasix 40 mg velasco       Return 8 weeks    MD BRANDIN Menon Patient Status  Functional Status: 70% - Cares for self: unable to carry on normal activity or active work  Physical Capacity: No Limitations    New diabetes onset between last follow-up to the current follow-up: No  Did patient have any acute rejection episodes during the follow-up period: No  Post transplant malignancy: No

## 2022-06-20 NOTE — LETTER
June 20, 2022        Jessika Carbone  [address]  [facility] [address]  Phone: [phone]  Fax: [phone]             [facility] [place] [facility]"" - Liver Transplant  [address] [address]  Phone: [phone]  Fax: [phone]   Patient: Tej Purdy   MR Number: [id number]   YOB: 1955   Date of Visit: 6/20/2022       Dear Dr. Jessika Carbone    Thank you for referring Tej Purdy to me for evaluation. Attached you will find relevant portions of my assessment and plan of care.    If you have questions, please do not hesitate to call me. I look forward to following Tej Purdy along with you.    Sincerely,    Thais Maxwell MD    Enclosure    If you would like to receive this communication electronically, please contact [email or web address] or [phone] to request [employer] access.    [employer] is a tool which provides read-only access to select patient information with whom you have a relationship. Its easy to use and provides real time access to review your patients record including encounter summaries, notes, results, and demographic information.    If you feel you have received this communication in error or would no longer like to receive these types of communications, please e-mail [email or web address]

## 2022-06-20 NOTE — PATIENT INSTRUCTIONS
Labs this week with BNP  Paracentesis with protein, albumin, cell count  F/u cardiology  Referral to Dr Forrester, new primary care MD

## 2022-06-21 ENCOUNTER — PATIENT MESSAGE (OUTPATIENT)
Dept: TRANSPLANT | Facility: CLINIC | Age: 67
End: 2022-06-21
Payer: MEDICARE

## 2022-06-21 DIAGNOSIS — Z94.4 LIVER REPLACED BY TRANSPLANT: ICD-10-CM

## 2022-06-21 DIAGNOSIS — Z79.60 LONG-TERM USE OF IMMUNOSUPPRESSANT MEDICATION: Primary | ICD-10-CM

## 2022-06-21 LAB — TACROLIMUS BLD-MCNC: 3.9 NG/ML (ref 5–15)

## 2022-06-21 RX ORDER — MYCOPHENOLATE MOFETIL 250 MG/1
500 CAPSULE ORAL 2 TIMES DAILY
Qty: 120 CAPSULE | Refills: 11 | Status: SHIPPED | OUTPATIENT
Start: 2022-06-21 | End: 2023-05-03 | Stop reason: SINTOL

## 2022-06-21 NOTE — TELEPHONE ENCOUNTER
Sent message to patient with med change    ----- Message from Thais Maxwell MD sent at 6/21/2022 12:50 PM CDT -----  The Labs are up a bit- add back cellcept 500 mg bid - please let patient know.

## 2022-06-23 ENCOUNTER — HOSPITAL ENCOUNTER (OUTPATIENT)
Dept: INTERVENTIONAL RADIOLOGY/VASCULAR | Facility: HOSPITAL | Age: 67
Discharge: HOME | End: 2022-06-23
Attending: INTERNAL MEDICINE
Payer: MEDICARE

## 2022-06-23 VITALS
DIASTOLIC BLOOD PRESSURE: 70 MMHG | OXYGEN SATURATION: 97 % | HEART RATE: 66 BPM | RESPIRATION RATE: 16 BRPM | SYSTOLIC BLOOD PRESSURE: 158 MMHG

## 2022-06-23 DIAGNOSIS — R18.8 OTHER ASCITES: ICD-10-CM

## 2022-06-23 LAB
ALBUMIN FLD-MCNC: 2.2 G/DL
APPEARANCE FLD: CLEAR
BODY FLD TYPE: NORMAL
COLOR FLD: YELLOW
LYMPHOCYTES NFR FLD MANUAL: 82 %
MESOTHL CELL NFR FLD MANUAL: 2 %
MONOS+MACROS NFR FLD MANUAL: 11 %
NEUTROPHILS NFR FLD MANUAL: 5 %
PROT FLD-MCNC: 3.2 G/DL
SPECIMEN SOURCE: NORMAL
SPECIMEN SOURCE: NORMAL
WBC # FLD: 919 /CU MM

## 2022-06-23 PROCEDURE — 84157 ASSAY OF PROTEIN OTHER: CPT | Performed by: INTERNAL MEDICINE

## 2022-06-23 PROCEDURE — 63600175 PHARM REV CODE 636 W HCPCS: Mod: JG | Performed by: INTERNAL MEDICINE

## 2022-06-23 PROCEDURE — 49083 ABD PARACENTESIS W/IMAGING: CPT | Performed by: RADIOLOGY

## 2022-06-23 PROCEDURE — 82042 OTHER SOURCE ALBUMIN QUAN EA: CPT | Performed by: INTERNAL MEDICINE

## 2022-06-23 PROCEDURE — 89051 BODY FLUID CELL COUNT: CPT | Performed by: INTERNAL MEDICINE

## 2022-06-23 PROCEDURE — C1729 CATH, DRAINAGE: HCPCS

## 2022-06-23 PROCEDURE — P9047 ALBUMIN (HUMAN), 25%, 50ML: HCPCS | Mod: JG | Performed by: INTERNAL MEDICINE

## 2022-06-23 PROCEDURE — 49083 IR PARACENTESIS WITH IMAGING: ICD-10-PCS | Mod: GC,,, | Performed by: RADIOLOGY

## 2022-06-23 PROCEDURE — 25000003 PHARM REV CODE 250: Performed by: INTERNAL MEDICINE

## 2022-06-23 RX ORDER — LIDOCAINE HYDROCHLORIDE 10 MG/ML
INJECTION INFILTRATION; PERINEURAL CODE/TRAUMA/SEDATION MEDICATION
Status: COMPLETED | OUTPATIENT
Start: 2022-06-23 | End: 2022-06-23

## 2022-06-23 RX ORDER — ALBUMIN HUMAN 250 G/1000ML
SOLUTION INTRAVENOUS
Status: COMPLETED | OUTPATIENT
Start: 2022-06-23 | End: 2022-06-23

## 2022-06-23 RX ORDER — ALBUMIN HUMAN 250 G/1000ML
SOLUTION INTRAVENOUS
Status: DISPENSED
Start: 2022-06-23 | End: 2022-06-23

## 2022-06-23 RX ADMIN — LIDOCAINE HYDROCHLORIDE 5 ML: 10 INJECTION, SOLUTION INFILTRATION; PERINEURAL at 08:06

## 2022-06-23 RX ADMIN — ALBUMIN (HUMAN) 50 G: 25 SOLUTION INTRAVENOUS at 09:06

## 2022-06-23 NOTE — DISCHARGE SUMMARY
Radiology Discharge Summary      Hospital Course: No complications    Admit Date: 6/23/2022  Discharge Date: 06/23/2022     Instructions Given to Patient: Yes  Diet: Resume prior diet  Activity: activity as tolerated    Description of Condition on Discharge: Stable  Vital Signs (Most Recent): Pulse: 89 (06/23/22 0818)  Resp: 18 (06/23/22 0818)  BP: (!) 191/88 (06/23/22 0852)  SpO2: 98 % (06/23/22 0818)    Discharge Disposition: Home    Discharge Diagnosis: Ascites      Follow-up: Per primary    Ayesha Sahni MD  Radiology Resident PGY- 2  [facility]   Pager: [phone]

## 2022-06-23 NOTE — PROCEDURES
Radiology Post-Procedure Note    Pre Op Diagnosis: Ascites  Post Op Diagnosis: Same    Procedure: Paracentesis    Procedure performed by: Yassine Wheatley MD and Ayesha Sahni MD    Written Informed Consent Obtained: Yes  Specimen Removed: YES 20cc of serous fluid   Estimated Blood Loss: Minimal    Findings:   Successful paracentesis.  Albumin administered PRN per protocol.    Patient tolerated procedure well.      Ayesha Sahni MD  Radiology Resident PGY- 2  [facility]

## 2022-06-23 NOTE — H&P
Radiology Pre-procedure Note    History of Present Illness:  Tej Purdy is a 66 y.o. male who presented for paracentesis.    Admission H&P reviewed.    Past Medical History:   Diagnosis Date    Atrial fibrillation     Cholangiocarcinoma 3/16/2022    Cirrhosis 11/23/2020    Diabetes mellitus, type 2     Diabetic neuropathy 11/24/2020    Disorder of kidney and ureter     HTN (hypertension) 11/23/2020    Hyperlipidemia 11/23/2020    Kidney stones 11/23/2020    S/p lithotripsy 2020    Leukocytosis 1/15/2021    Liver mass 11/23/2020    CASTILLO (nonalcoholic steatohepatitis) 11/23/2020    Other ascites 3/16/2022    PAD (peripheral artery disease)     Portal hypertension 11/23/2020    Skin cancer 11/23/2020     Past Surgical History:   Procedure Laterality Date    COLONOSCOPY  10/2020    ESOPHAGOGASTRODUODENOSCOPY  10/2020    ESOPHAGOGASTRODUODENOSCOPY N/A 7/1/2021    Procedure: EGD (ESOPHAGOGASTRODUODENOSCOPY);  Surgeon: Jovan David MD;  Location: [facility] (4TH FLR);  Service: Endoscopy;  Laterality: N/A;  HCC. Listed for liver transplant. EGD for variceal surveillance.  cardiac clearance and blood thinenr approval received, see telephone encounter 6/23/21-BB  fully vaccinated-BB  labs same day of procedure-BB    EXCISION OF HYDROCELE Right     hemorroid surgery      hemorroidectomy      KIDNEY STONE SURGERY      LEFT HEART CATHETERIZATION N/A 1/27/2021    Procedure: Left heart cath;  Surgeon: Kris Shelton MD;  Location: [facility] CATH LAB;  Service: Cardiology;  Laterality: N/A;    liver mass removal      LIVER TRANSPLANT N/A 1/6/2022    Procedure: TRANSPLANT, LIVER;  Surgeon: Lizet Garcia MD;  Location: [facility] OR 2ND FLR;  Service: Transplant;  Laterality: N/A;    RIGHT HEART CATHETERIZATION Right 5/7/2021    Procedure: INSERTION, CATHETER, RIGHT HEART;  Surgeon: Jaden Schuster MD;  Location: [facility] CATH LAB;  Service: Cardiology;  Laterality: Right;    TREATMENT OF CARDIAC  ARRHYTHMIA N/A 5/19/2021    Procedure: CARDIOVERSION;  Surgeon: Didier Tilley MD;  Location: [facility] EP LAB;  Service: Cardiology;  Laterality: N/A;  a fib, dccv/johny, mac, dm. sscu    TREATMENT OF CARDIAC ARRHYTHMIA N/A 5/31/2021    Procedure: CARDIOVERSION;  Surgeon: Daniel Arambula MD;  Location: [facility] EP LAB;  Service: Cardiology;  Laterality: N/A;  afib, DCCV, anest., DM, 3 prep    TREATMENT OF CARDIAC ARRHYTHMIA N/A 7/7/2021    Procedure: Cardioversion or Defibrillation;  Surgeon: Didier Tilley MD;  Location: [facility] EP LAB;  Service: Cardiology;  Laterality: N/A;  AF, JOHNY (cancel if complaint), DCCV, MAC, DM, 3 Prep    TREATMENT OF CARDIAC ARRHYTHMIA N/A 7/27/2021    Procedure: Cardioversion or Defibrillation;  Surgeon: Didier Tilley MD;  Location: [facility] EP LAB;  Service: Cardiology;  Laterality: N/A;  AF, JOHNY (cx if complaint), DCCV, MAC, DM, 3 Prep       Review of Systems:   As documented in primary team H&P    Home Meds:   Prior to Admission medications    Medication Sig Start Date End Date Taking? Authorizing Provider   amiodarone (PACERONE) 200 MG Tab Take 1 tablet (200 mg total) by mouth once daily. 1/13/22   Werner Zamarripa MD   apixaban (ELIQUIS) 5 mg Tab Take 1 tablet (5 mg total) by mouth 2 (two) times daily. 1/7/22   Leland Munoz MD   aspirin (ECOTRIN) 81 MG EC tablet Take 81 mg by mouth once daily.    Historical Provider   blood sugar diagnostic Strp To check BG 2 times daily, to use with insurance preferred meter (patient has a [employer] self-monitoring glucose meter) 2/16/22   Carlos Argueta MD   blood-glucose meter kit To check BG 2 times daily, to use with insurance preferred meter 7/16/21   Carlos Argueta MD   calcium carbonate (TUMS) 200 mg calcium (500 mg) chewable tablet Take 1 tablet (500 mg total) by mouth 3 (three) times daily as needed for Heartburn. 1/13/22 1/13/23  Werner Zamarripa MD   calcium carbonate-vitamin D3 600 mg-20 mcg (800 unit) Tab Take 1 tablet by mouth once  daily. 1/13/22   Werner Zamarripa MD   furosemide (LASIX) 40 MG tablet Take 1 tablet (40 mg total) by mouth once daily. 6/20/22 6/20/23  Thais Maxwell MD   gabapentin (NEURONTIN) 300 MG capsule Take 1 capsule (300 mg total) by mouth 3 (three) times daily as needed (neuropathy). 1/13/22   Werner Zamarripa MD   insulin (LANTUS SOLOSTAR U-100 INSULIN) glargine 100 units/mL (3mL) SubQ pen Inject 24 Units into the skin every evening.  Patient taking differently: Inject 20 Units into the skin every evening. 1/13/22   Werner Zamarripa MD   insulin lispro (HUMALOG KWIKPEN INSULIN) 100 unit/mL pen Inject 6 Units into the skin 3 (three) times daily. Plus sliding scale, MDD: 48 units  Patient taking differently: Inject 6 Units into the skin once daily. Plus sliding scale, MDD: 48 units 1/13/22 1/13/23  Werner Zamarripa MD   lancets Misc To check BG 2 times daily, to use with insurance preferred meter 2/16/22   Carlos Argueta MD   lovastatin (MEVACOR) 20 MG tablet Take 20 mg by mouth once daily. 1/20/22   Historical Provider   magnesium oxide (MAG-OX) 400 mg (241.3 mg magnesium) tablet Take 2 tablets (800 mg total) by mouth every morning AND 1 tablet (400 mg total) with lunch AND 1 tablet (400 mg total) every evening. 6/15/22   Thais Maxwell MD   multivitamin capsule Take 1 capsule by mouth once daily.    Historical Provider   mycophenolate (CELLCEPT) 250 mg Cap Take 2 capsules (500 mg total) by mouth 2 (two) times daily. 6/21/22 6/21/23  Thais Maxwell MD   omega-3 fatty acids/fish oil (FISH OIL-OMEGA-3 FATTY ACIDS) 300-1,000 mg capsule Take 1 capsule by mouth once daily.     Historical Provider   ondansetron (ZOFRAN-ODT) 4 MG TbDL Take 1 tablet (4 mg total) by mouth every 8 (eight) hours as needed (nausea).  Patient not taking: Reported on 6/20/2022 4/1/22   Thais Maxwell MD   pantoprazole (PROTONIX) 40 MG tablet Take 1 tablet (40 mg total) by mouth once daily. 1/13/22   Werner Zamarripa MD   pen needle, diabetic (BD  "ULTRA-FINE GÉNESIS PEN NEEDLE) 32 gauge x 5/32" Ndle Use to inject insulin 4 times daily 5/17/22   Carlos Argueta MD   pulse oximeter (PULSE OXIMETER) device by Apply Externally route 2 (two) times a day. Use twice daily at 8 AM and 3 PM and record the value in MyChart as directed. 2/6/22   GENE Hou   sertraline (ZOLOFT) 25 MG tablet Take 1 tablet (25 mg total) by mouth once daily. 1/13/22   Werner Zamarripa MD   tacrolimus (PROGRAF) 0.5 MG Cap Take 1 capsule (0.5 mg total) by mouth every 12 (twelve) hours. 4/4/22   Thais Maxwell MD   tamsulosin (FLOMAX) 0.4 mg Cap Take 1 capsule (0.4 mg total) by mouth every evening.  Patient not taking: Reported on 6/20/2022 5/10/22 5/10/23  Werner Zamarripa MD   zolpidem (AMBIEN) 5 MG Tab Take 1 tablet (5 mg total) by mouth nightly as needed (insomnia). 6/20/22 12/19/22  Thais Maxwell MD     Scheduled Meds:   Continuous Infusions:   PRN Meds:  Anticoagulants/Antiplatelets: no anticoagulation    Allergies:   Review of patient's allergies indicates:   Allergen Reactions    Bee pollens Swelling     BEE STINGS swells body     Sedation Hx: have not been any systemic reactions    Labs:  No results for input(s): INR in the last 168 hours.    Invalid input(s):  PT,  PTT    Recent Labs   Lab 06/20/22  1030   WBC 5.25   HGB 9.9*   HCT 35.3*   MCV 76*         Recent Labs   Lab 06/20/22  1030   *      K 4.2      CO2 29   BUN 17   CREATININE 1.0   CALCIUM 9.3   MG 1.7   ALT 27   AST 38   ALBUMIN 3.4*   BILITOT 0.5         Vitals:  Pulse: 89 (06/23/22 0818)  Resp: 18 (06/23/22 0818)  BP: (!) 201/85 (06/23/22 0818)  SpO2: 98 % (06/23/22 0818)     Physical Exam:  ASA: II  Mallampati: II    General: no acute distress  Mental Status: alert and oriented to person, place and time  HEENT: normocephalic, atraumatic  Chest: unlabored breathing  Heart: regular heart rate  Abdomen: nondistended  Extremity: moves all extremities      Plan:  Sedation Plan: Local "   Patient will undergo paracentesis .          Ayesha Sahni MD  Radiology Resident PGY- 2  [facility]

## 2022-06-23 NOTE — PLAN OF CARE
Patient tolerated US-guided paracentesis well. Removed 6250 ml serous ascites. Albumin 50g IV administered per protocol. Patient awake and alert, no distress noted, respirations even and unlabored. Stable for discharge home with wife.  Vitals:    06/23/22 0959   BP: (!) 158/70   Pulse: 66   Resp: 16

## 2022-06-23 NOTE — PLAN OF CARE
Patient presents for paracentesis. Patient is awake and alert, no distress noted, respirations even and unlabored. Allergies reviewed. Waiting for eval and consent.  Vitals:    06/23/22 0818   BP: (!) 201/85   Pulse: 89   Resp: 18

## 2022-06-27 ENCOUNTER — LAB VISIT (OUTPATIENT)
Dept: LAB | Facility: HOSPITAL | Age: 67
End: 2022-06-27
Attending: INTERNAL MEDICINE
Payer: MEDICARE

## 2022-06-27 DIAGNOSIS — Z94.4 S/P LIVER TRANSPLANT: ICD-10-CM

## 2022-06-27 DIAGNOSIS — E83.42 HYPOMAGNESEMIA: ICD-10-CM

## 2022-06-27 LAB
ALBUMIN SERPL BCP-MCNC: 3.3 G/DL (ref 3.5–5.2)
ALP SERPL-CCNC: 97 U/L (ref 55–135)
ALT SERPL W/O P-5'-P-CCNC: 29 U/L (ref 10–44)
ANION GAP SERPL CALC-SCNC: 13 MMOL/L (ref 8–16)
AST SERPL-CCNC: 40 U/L (ref 10–40)
BASOPHILS # BLD AUTO: 0.06 K/UL (ref 0–0.2)
BASOPHILS NFR BLD: 1 % (ref 0–1.9)
BILIRUB SERPL-MCNC: 0.6 MG/DL (ref 0.1–1)
BUN SERPL-MCNC: 17 MG/DL (ref 8–23)
CALCIUM SERPL-MCNC: 8.7 MG/DL (ref 8.7–10.5)
CHLORIDE SERPL-SCNC: 104 MMOL/L (ref 95–110)
CO2 SERPL-SCNC: 24 MMOL/L (ref 23–29)
CREAT SERPL-MCNC: 0.9 MG/DL (ref 0.5–1.4)
DIFFERENTIAL METHOD: ABNORMAL
EOSINOPHIL # BLD AUTO: 0.1 K/UL (ref 0–0.5)
EOSINOPHIL NFR BLD: 2.4 % (ref 0–8)
ERYTHROCYTE [DISTWIDTH] IN BLOOD BY AUTOMATED COUNT: 18.3 % (ref 11.5–14.5)
EST. GFR  (AFRICAN AMERICAN): >60 ML/MIN/1.73 M^2
EST. GFR  (NON AFRICAN AMERICAN): >60 ML/MIN/1.73 M^2
GLUCOSE SERPL-MCNC: 109 MG/DL (ref 70–110)
HCT VFR BLD AUTO: 35.9 % (ref 40–54)
HGB BLD-MCNC: 10.1 G/DL (ref 14–18)
IMM GRANULOCYTES # BLD AUTO: 0.02 K/UL (ref 0–0.04)
IMM GRANULOCYTES NFR BLD AUTO: 0.3 % (ref 0–0.5)
LYMPHOCYTES # BLD AUTO: 0.9 K/UL (ref 1–4.8)
LYMPHOCYTES NFR BLD: 15.4 % (ref 18–48)
MAGNESIUM SERPL-MCNC: 1.5 MG/DL (ref 1.6–2.6)
MCH RBC QN AUTO: 21.3 PG (ref 27–31)
MCHC RBC AUTO-ENTMCNC: 28.1 G/DL (ref 32–36)
MCV RBC AUTO: 76 FL (ref 82–98)
MONOCYTES # BLD AUTO: 0.8 K/UL (ref 0.3–1)
MONOCYTES NFR BLD: 13.7 % (ref 4–15)
NEUTROPHILS # BLD AUTO: 3.9 K/UL (ref 1.8–7.7)
NEUTROPHILS NFR BLD: 67.2 % (ref 38–73)
NRBC BLD-RTO: 0 /100 WBC
PLATELET # BLD AUTO: 142 K/UL (ref 150–450)
PMV BLD AUTO: 10.8 FL (ref 9.2–12.9)
POTASSIUM SERPL-SCNC: 4 MMOL/L (ref 3.5–5.1)
PROT SERPL-MCNC: 5.9 G/DL (ref 6–8.4)
RBC # BLD AUTO: 4.75 M/UL (ref 4.6–6.2)
SODIUM SERPL-SCNC: 141 MMOL/L (ref 136–145)
WBC # BLD AUTO: 5.86 K/UL (ref 3.9–12.7)

## 2022-06-27 PROCEDURE — 36415 COLL VENOUS BLD VENIPUNCTURE: CPT | Mod: PN | Performed by: INTERNAL MEDICINE

## 2022-06-27 PROCEDURE — 85025 COMPLETE CBC W/AUTO DIFF WBC: CPT | Performed by: INTERNAL MEDICINE

## 2022-06-27 PROCEDURE — 83735 ASSAY OF MAGNESIUM: CPT | Performed by: INTERNAL MEDICINE

## 2022-06-27 PROCEDURE — 80197 ASSAY OF TACROLIMUS: CPT | Performed by: INTERNAL MEDICINE

## 2022-06-27 PROCEDURE — 80053 COMPREHEN METABOLIC PANEL: CPT | Performed by: INTERNAL MEDICINE

## 2022-06-28 LAB — TACROLIMUS BLD-MCNC: 26.5 NG/ML (ref 5–15)

## 2022-06-28 NOTE — PROGRESS NOTES
Clinic Note  2022      Subjective:       Patient ID:  Tej is a 66 y.o. male being seen for an new visit to establish primary care.     Chief Complaint:   Patient presents to clinic as a new patient to establish primary care.     HPI  Mr. Purdy underwent a liver transplant in January due to liver carcinoma. He denies any SOB, chest pain, N/V, unintentional weight loss, loss of appetite, fatigue, diarrhea, constipation. He is active daily and remains independent with ADL's.  He has intermittent ascites which is being drained by the transplant team. He will be following up with cardiology next week due to parenthesis fluid may be associated with cardiac issues.     Family History   Problem Relation Age of Onset    Coronary artery disease Father      Social History     Socioeconomic History    Marital status:    Tobacco Use    Smoking status: Former Smoker     Quit date: 1980     Years since quittin.4    Smokeless tobacco: Former User   Substance and Sexual Activity    Alcohol use: Not Currently    Drug use: Never     Social Determinants of Health     Financial Resource Strain: Low Risk     Difficulty of Paying Living Expenses: Not very hard   Food Insecurity: No Food Insecurity    Worried About Running Out of Food in the Last Year: Never true    Ran Out of Food in the Last Year: Never true   Transportation Needs: No Transportation Needs    Lack of Transportation (Medical): No    Lack of Transportation (Non-Medical): No   Physical Activity: Unknown    Days of Exercise per Week: 0 days    Minutes of Exercise per Session: Patient refused   Stress: Stress Concern Present    Feeling of Stress : To some extent   Social Connections: Unknown    Frequency of Communication with Friends and Family: Once a week    Frequency of Social Gatherings with Friends and Family: Once a week    Active Member of Clubs or Organizations: Yes    Attends Club or Organization Meetings: 1 to 4 times per  year    Marital Status:    Housing Stability: Low Risk     Unable to Pay for Housing in the Last Year: No    Number of Places Lived in the Last Year: 1    Unstable Housing in the Last Year: No     Past Surgical History:   Procedure Laterality Date    COLONOSCOPY  10/2020    ESOPHAGOGASTRODUODENOSCOPY  10/2020    ESOPHAGOGASTRODUODENOSCOPY N/A 7/1/2021    Procedure: EGD (ESOPHAGOGASTRODUODENOSCOPY);  Surgeon: Jovan David MD;  Location: [facility] (4TH FLR);  Service: Endoscopy;  Laterality: N/A;  HCC. Listed for liver transplant. EGD for variceal surveillance.  cardiac clearance and blood thinenr approval received, see telephone encounter 6/23/21-BB  fully vaccinated-BB  labs same day of procedure-BB    EXCISION OF HYDROCELE Right     hemorroid surgery      hemorroidectomy      KIDNEY STONE SURGERY      LEFT HEART CATHETERIZATION N/A 1/27/2021    Procedure: Left heart cath;  Surgeon: Kris Shelton MD;  Location: [facility] CATH LAB;  Service: Cardiology;  Laterality: N/A;    liver mass removal      LIVER TRANSPLANT N/A 1/6/2022    Procedure: TRANSPLANT, LIVER;  Surgeon: Lizet Garcia MD;  Location: [facility] OR 2ND FLR;  Service: Transplant;  Laterality: N/A;    RIGHT HEART CATHETERIZATION Right 5/7/2021    Procedure: INSERTION, CATHETER, RIGHT HEART;  Surgeon: Jaden Schuster MD;  Location: [facility] CATH LAB;  Service: Cardiology;  Laterality: Right;    TREATMENT OF CARDIAC ARRHYTHMIA N/A 5/19/2021    Procedure: CARDIOVERSION;  Surgeon: Didier Tilley MD;  Location: [facility] EP LAB;  Service: Cardiology;  Laterality: N/A;  a fib, dccv/johny, mac, dm. sscu    TREATMENT OF CARDIAC ARRHYTHMIA N/A 5/31/2021    Procedure: CARDIOVERSION;  Surgeon: Daniel Arambula MD;  Location: [facility] EP LAB;  Service: Cardiology;  Laterality: N/A;  afib, DCCV, anest., DM, 3 prep    TREATMENT OF CARDIAC ARRHYTHMIA N/A 7/7/2021    Procedure: Cardioversion or Defibrillation;  Surgeon: Didier Tilley MD;  Location: [facility] EP  LAB;  Service: Cardiology;  Laterality: N/A;  AF, ASHISH (cancel if complaint), DCCV, MAC, DM, 3 Prep    TREATMENT OF CARDIAC ARRHYTHMIA N/A 7/27/2021    Procedure: Cardioversion or Defibrillation;  Surgeon: Didier Tilley MD;  Location: [facility] EP LAB;  Service: Cardiology;  Laterality: N/A;  AF, ASHISH (cx if complaint), DCCV, MAC, DM, 3 Prep     Medication List with Changes/Refills   Current Medications    AMIODARONE (PACERONE) 200 MG TAB    Take 1 tablet (200 mg total) by mouth once daily.    APIXABAN (ELIQUIS) 5 MG TAB    Take 1 tablet (5 mg total) by mouth 2 (two) times daily.    ASPIRIN (ECOTRIN) 81 MG EC TABLET    Take 81 mg by mouth once daily.    BLOOD SUGAR DIAGNOSTIC STRP    To check BG 2 times daily, to use with insurance preferred meter (patient has a [employer] self-monitoring glucose meter)    BLOOD-GLUCOSE METER KIT    To check BG 2 times daily, to use with insurance preferred meter    CALCIUM CARBONATE (TUMS) 200 MG CALCIUM (500 MG) CHEWABLE TABLET    Take 1 tablet (500 mg total) by mouth 3 (three) times daily as needed for Heartburn.    CALCIUM CARBONATE-VITAMIN D3 600 MG-20 MCG (800 UNIT) TAB    Take 1 tablet by mouth once daily.    FUROSEMIDE (LASIX) 40 MG TABLET    Take 1 tablet (40 mg total) by mouth once daily.    GABAPENTIN (NEURONTIN) 300 MG CAPSULE    Take 1 capsule (300 mg total) by mouth 3 (three) times daily as needed (neuropathy).    INSULIN (LANTUS SOLOSTAR U-100 INSULIN) GLARGINE 100 UNITS/ML (3ML) SUBQ PEN    Inject 24 Units into the skin every evening.    INSULIN LISPRO (HUMALOG KWIKPEN INSULIN) 100 UNIT/ML PEN    Inject 6 Units into the skin 3 (three) times daily. Plus sliding scale, MDD: 48 units    LANCETS MISC    To check BG 2 times daily, to use with insurance preferred meter    LOVASTATIN (MEVACOR) 20 MG TABLET    Take 20 mg by mouth once daily.    MAGNESIUM OXIDE (MAG-OX) 400 MG (241.3 MG MAGNESIUM) TABLET    Take 2 tablets (800 mg total) by mouth 3 (three) times daily.     "MULTIVITAMIN CAPSULE    Take 1 capsule by mouth once daily.    MYCOPHENOLATE (CELLCEPT) 250 MG CAP    Take 2 capsules (500 mg total) by mouth 2 (two) times daily.    OMEGA-3 FATTY ACIDS/FISH OIL (FISH OIL-OMEGA-3 FATTY ACIDS) 300-1,000 MG CAPSULE    Take 1 capsule by mouth once daily.     ONDANSETRON (ZOFRAN-ODT) 4 MG TBDL    Take 1 tablet (4 mg total) by mouth every 8 (eight) hours as needed (nausea).    PANTOPRAZOLE (PROTONIX) 40 MG TABLET    Take 1 tablet (40 mg total) by mouth once daily.    PEN NEEDLE, DIABETIC (BD ULTRA-FINE GÉNESIS PEN NEEDLE) 32 GAUGE X 5/32" NDLE    Use to inject insulin 4 times daily    PULSE OXIMETER (PULSE OXIMETER) DEVICE    by Apply Externally route 2 (two) times a day. Use twice daily at 8 AM and 3 PM and record the value in MyChart as directed.    SERTRALINE (ZOLOFT) 25 MG TABLET    Take 1 tablet (25 mg total) by mouth once daily.    TACROLIMUS (PROGRAF) 0.5 MG CAP    Take 1 capsule (0.5 mg total) by mouth every 12 (twelve) hours.    ZOLPIDEM (AMBIEN) 5 MG TAB    Take 1 tablet (5 mg total) by mouth nightly as needed (insomnia).   Discontinued Medications    TAMSULOSIN (FLOMAX) 0.4 MG CAP    Take 1 capsule (0.4 mg total) by mouth every evening.     Patient Active Problem List   Diagnosis    Hyperlipidemia    Skin cancer    Kidney stone    Diabetic neuropathy    PAD (peripheral artery disease)    Type 2 diabetes mellitus    Coronary artery disease    Persistent atrial fibrillation    History of cardioversion    Current use of anticoagulant therapy    S/P liver transplant    At risk for opportunistic infections    Prophylactic immunotherapy    Anemia of chronic disease    Long-term use of immunosuppressant medication    Weakness    Type 2 diabetes mellitus with hyperglycemia    Anemia due to unknown mechanism    Fatigue    Other ascites    Cholangiocarcinoma    SOB (shortness of breath)    Nonrheumatic aortic valve stenosis     Review of Systems   Constitutional: " Negative.    HENT: Negative.    Eyes: Negative.    Respiratory: Negative.    Cardiovascular: Negative.    Gastrointestinal: Negative.         + intermittent ascites   Genitourinary: Negative.    Musculoskeletal: Negative.    Skin: Negative.    Neurological: Negative.    Endo/Heme/Allergies: Negative.    Psychiatric/Behavioral: Negative.          Objective:      /62   Pulse 91   Temp 98.4 °F (36.9 °C)   Resp 18   Ht 6' (1.829 m)   Wt 99.8 kg (220 lb 0.3 oz)   SpO2 96%   BMI 29.84 kg/m²   Estimated body mass index is 29.84 kg/m² as calculated from the following:    Height as of this encounter: 6' (1.829 m).    Weight as of this encounter: 99.8 kg (220 lb 0.3 oz).  Physical Exam  Vitals reviewed.   Constitutional:       Appearance: Normal appearance. He is normal weight.   HENT:      Head: Normocephalic and atraumatic.      Right Ear: Tympanic membrane, ear canal and external ear normal.      Left Ear: Tympanic membrane, ear canal and external ear normal.      Nose: Nose normal.      Mouth/Throat:      Mouth: Mucous membranes are moist.      Pharynx: Oropharynx is clear.   Eyes:      Extraocular Movements: Extraocular movements intact.      Conjunctiva/sclera: Conjunctivae normal.      Pupils: Pupils are equal, round, and reactive to light.   Cardiovascular:      Rate and Rhythm: Normal rate and regular rhythm.      Pulses: Normal pulses.      Heart sounds: Murmur heard.   Pulmonary:      Effort: Pulmonary effort is normal.      Breath sounds: Normal breath sounds.   Abdominal:      General: Abdomen is flat. Bowel sounds are normal.      Palpations: Abdomen is soft.   Musculoskeletal:         General: Normal range of motion.      Cervical back: Normal range of motion and neck supple.   Skin:     General: Skin is warm and dry.      Capillary Refill: Capillary refill takes less than 2 seconds.   Neurological:      General: No focal deficit present.      Mental Status: He is alert and oriented to person,  place, and time. Mental status is at baseline.   Psychiatric:         Mood and Affect: Mood normal.         Behavior: Behavior normal.         Thought Content: Thought content normal.         Judgment: Judgment normal.           Assessment and Plan:           Problem List Items Addressed This Visit        Cardiac/Vascular    Hyperlipidemia    Relevant Orders    Lipid Panel       Endocrine    Type 2 diabetes mellitus    Relevant Orders    Hemoglobin A1C       GI    Other ascites      Other Visit Diagnoses     Wellness examination    -  Primary    Status post liver transplant        Prostate cancer screening        Relevant Orders    PSA, Screening    Encounter to establish care        Colon cancer screening              Follow up:   No follow-ups on file.     Other Orders Placed This Visit:  Orders Placed This Encounter   Procedures    PSA, Screening     Standing Status:   Future     Standing Expiration Date:   8/27/2023    Lipid Panel     Standing Status:   Future     Standing Expiration Date:   8/27/2023    Hemoglobin A1C     Standing Status:   Future     Standing Expiration Date:   8/27/2023           Hilaria Mcgarry

## 2022-06-29 ENCOUNTER — PATIENT MESSAGE (OUTPATIENT)
Dept: TRANSPLANT | Facility: CLINIC | Age: 67
End: 2022-06-29
Payer: MEDICARE

## 2022-06-29 ENCOUNTER — TELEPHONE (OUTPATIENT)
Dept: TRANSPLANT | Facility: CLINIC | Age: 67
End: 2022-06-29
Payer: MEDICARE

## 2022-06-29 NOTE — TELEPHONE ENCOUNTER
Left voicemail and sent message for patient to get back to me.  Tacrolimus level was 26.5.  Needed to know if he took medication before his labs, creatinine and potassium were normal.

## 2022-06-30 ENCOUNTER — OFFICE VISIT (OUTPATIENT)
Dept: PRIMARY CARE CLINIC | Facility: CLINIC | Age: 67
End: 2022-06-30
Payer: MEDICARE

## 2022-06-30 ENCOUNTER — PATIENT MESSAGE (OUTPATIENT)
Dept: TRANSPLANT | Facility: CLINIC | Age: 67
End: 2022-06-30
Payer: MEDICARE

## 2022-06-30 ENCOUNTER — LAB VISIT (OUTPATIENT)
Dept: LAB | Facility: HOSPITAL | Age: 67
End: 2022-06-30
Attending: INTERNAL MEDICINE
Payer: MEDICARE

## 2022-06-30 VITALS
TEMPERATURE: 98 F | BODY MASS INDEX: 29.8 KG/M2 | HEART RATE: 91 BPM | WEIGHT: 220 LBS | SYSTOLIC BLOOD PRESSURE: 128 MMHG | DIASTOLIC BLOOD PRESSURE: 62 MMHG | OXYGEN SATURATION: 96 % | HEIGHT: 72 IN | RESPIRATION RATE: 18 BRPM

## 2022-06-30 DIAGNOSIS — E11.21 TYPE 2 DIABETES MELLITUS WITH DIABETIC NEPHROPATHY, WITH LONG-TERM CURRENT USE OF INSULIN: ICD-10-CM

## 2022-06-30 DIAGNOSIS — Z94.4 STATUS POST LIVER TRANSPLANT: ICD-10-CM

## 2022-06-30 DIAGNOSIS — Z12.5 PROSTATE CANCER SCREENING: ICD-10-CM

## 2022-06-30 DIAGNOSIS — E83.42 HYPOMAGNESEMIA: ICD-10-CM

## 2022-06-30 DIAGNOSIS — Z79.4 TYPE 2 DIABETES MELLITUS WITH DIABETIC NEPHROPATHY, WITH LONG-TERM CURRENT USE OF INSULIN: ICD-10-CM

## 2022-06-30 DIAGNOSIS — Z94.4 S/P LIVER TRANSPLANT: ICD-10-CM

## 2022-06-30 DIAGNOSIS — Z94.4 LIVER REPLACED BY TRANSPLANT: Primary | ICD-10-CM

## 2022-06-30 DIAGNOSIS — R18.8 OTHER ASCITES: ICD-10-CM

## 2022-06-30 DIAGNOSIS — Z00.00 WELLNESS EXAMINATION: Primary | ICD-10-CM

## 2022-06-30 DIAGNOSIS — Z76.89 ENCOUNTER TO ESTABLISH CARE: ICD-10-CM

## 2022-06-30 DIAGNOSIS — E78.5 HYPERLIPIDEMIA, UNSPECIFIED HYPERLIPIDEMIA TYPE: ICD-10-CM

## 2022-06-30 DIAGNOSIS — Z12.11 COLON CANCER SCREENING: ICD-10-CM

## 2022-06-30 PROCEDURE — 4010F PR ACE/ARB THEARPY RXD/TAKEN: ICD-10-PCS | Mod: CPTII,S$GLB,, | Performed by: NURSE PRACTITIONER

## 2022-06-30 PROCEDURE — 99204 OFFICE O/P NEW MOD 45 MIN: CPT | Mod: S$GLB,,, | Performed by: NURSE PRACTITIONER

## 2022-06-30 PROCEDURE — 3074F SYST BP LT 130 MM HG: CPT | Mod: CPTII,S$GLB,, | Performed by: NURSE PRACTITIONER

## 2022-06-30 PROCEDURE — 1126F AMNT PAIN NOTED NONE PRSNT: CPT | Mod: CPTII,S$GLB,, | Performed by: NURSE PRACTITIONER

## 2022-06-30 PROCEDURE — 1101F PR PT FALLS ASSESS DOC 0-1 FALLS W/OUT INJ PAST YR: ICD-10-PCS | Mod: CPTII,S$GLB,, | Performed by: NURSE PRACTITIONER

## 2022-06-30 PROCEDURE — 3074F PR MOST RECENT SYSTOLIC BLOOD PRESSURE < 130 MM HG: ICD-10-PCS | Mod: CPTII,S$GLB,, | Performed by: NURSE PRACTITIONER

## 2022-06-30 PROCEDURE — 1159F PR MEDICATION LIST DOCUMENTED IN MEDICAL RECORD: ICD-10-PCS | Mod: CPTII,S$GLB,, | Performed by: NURSE PRACTITIONER

## 2022-06-30 PROCEDURE — 99999 PR PBB SHADOW E&M-EST. PATIENT-LVL V: CPT | Mod: PBBFAC,,, | Performed by: NURSE PRACTITIONER

## 2022-06-30 PROCEDURE — 1160F RVW MEDS BY RX/DR IN RCRD: CPT | Mod: CPTII,S$GLB,, | Performed by: NURSE PRACTITIONER

## 2022-06-30 PROCEDURE — 3078F PR MOST RECENT DIASTOLIC BLOOD PRESSURE < 80 MM HG: ICD-10-PCS | Mod: CPTII,S$GLB,, | Performed by: NURSE PRACTITIONER

## 2022-06-30 PROCEDURE — 4010F ACE/ARB THERAPY RXD/TAKEN: CPT | Mod: CPTII,S$GLB,, | Performed by: NURSE PRACTITIONER

## 2022-06-30 PROCEDURE — 3288F PR FALLS RISK ASSESSMENT DOCUMENTED: ICD-10-PCS | Mod: CPTII,S$GLB,, | Performed by: NURSE PRACTITIONER

## 2022-06-30 PROCEDURE — 3044F HG A1C LEVEL LT 7.0%: CPT | Mod: CPTII,S$GLB,, | Performed by: NURSE PRACTITIONER

## 2022-06-30 PROCEDURE — 1126F PR PAIN SEVERITY QUANTIFIED, NO PAIN PRESENT: ICD-10-PCS | Mod: CPTII,S$GLB,, | Performed by: NURSE PRACTITIONER

## 2022-06-30 PROCEDURE — 36415 COLL VENOUS BLD VENIPUNCTURE: CPT | Mod: PN | Performed by: INTERNAL MEDICINE

## 2022-06-30 PROCEDURE — 99204 PR OFFICE/OUTPT VISIT, NEW, LEVL IV, 45-59 MIN: ICD-10-PCS | Mod: S$GLB,,, | Performed by: NURSE PRACTITIONER

## 2022-06-30 PROCEDURE — 1160F PR REVIEW ALL MEDS BY PRESCRIBER/CLIN PHARMACIST DOCUMENTED: ICD-10-PCS | Mod: CPTII,S$GLB,, | Performed by: NURSE PRACTITIONER

## 2022-06-30 PROCEDURE — 3078F DIAST BP <80 MM HG: CPT | Mod: CPTII,S$GLB,, | Performed by: NURSE PRACTITIONER

## 2022-06-30 PROCEDURE — 99999 PR PBB SHADOW E&M-EST. PATIENT-LVL V: ICD-10-PCS | Mod: PBBFAC,,, | Performed by: NURSE PRACTITIONER

## 2022-06-30 PROCEDURE — 1159F MED LIST DOCD IN RCRD: CPT | Mod: CPTII,S$GLB,, | Performed by: NURSE PRACTITIONER

## 2022-06-30 PROCEDURE — 1101F PT FALLS ASSESS-DOCD LE1/YR: CPT | Mod: CPTII,S$GLB,, | Performed by: NURSE PRACTITIONER

## 2022-06-30 PROCEDURE — 80197 ASSAY OF TACROLIMUS: CPT | Performed by: INTERNAL MEDICINE

## 2022-06-30 PROCEDURE — 3008F BODY MASS INDEX DOCD: CPT | Mod: CPTII,S$GLB,, | Performed by: NURSE PRACTITIONER

## 2022-06-30 PROCEDURE — 3008F PR BODY MASS INDEX (BMI) DOCUMENTED: ICD-10-PCS | Mod: CPTII,S$GLB,, | Performed by: NURSE PRACTITIONER

## 2022-06-30 PROCEDURE — 3044F PR MOST RECENT HEMOGLOBIN A1C LEVEL <7.0%: ICD-10-PCS | Mod: CPTII,S$GLB,, | Performed by: NURSE PRACTITIONER

## 2022-06-30 PROCEDURE — 3288F FALL RISK ASSESSMENT DOCD: CPT | Mod: CPTII,S$GLB,, | Performed by: NURSE PRACTITIONER

## 2022-06-30 RX ORDER — LANOLIN ALCOHOL/MO/W.PET/CERES
800 CREAM (GRAM) TOPICAL 3 TIMES DAILY
Qty: 180 TABLET | Refills: 6 | Status: ON HOLD | OUTPATIENT
Start: 2022-06-30 | End: 2023-07-16 | Stop reason: SDUPTHER

## 2022-06-30 NOTE — TELEPHONE ENCOUNTER
Sent message to let patient know to get labs on 7/06/22 and for med change.    ----- Message from Thais Maxwell MD sent at 6/27/2022  5:17 PM CDT -----  The Labs are stable -increase mg oxide to 800 mg tid- please let patient know.

## 2022-07-01 LAB — TACROLIMUS BLD-MCNC: 9.9 NG/ML (ref 5–15)

## 2022-07-05 ENCOUNTER — HOSPITAL ENCOUNTER (OUTPATIENT)
Dept: CARDIOLOGY | Facility: HOSPITAL | Age: 67
Discharge: HOME | End: 2022-07-05
Attending: INTERNAL MEDICINE
Payer: MEDICARE

## 2022-07-05 VITALS
WEIGHT: 228 LBS | HEART RATE: 87 BPM | HEIGHT: 72 IN | DIASTOLIC BLOOD PRESSURE: 78 MMHG | BODY MASS INDEX: 30.88 KG/M2 | SYSTOLIC BLOOD PRESSURE: 122 MMHG

## 2022-07-05 DIAGNOSIS — I35.0 NONRHEUMATIC AORTIC VALVE STENOSIS: ICD-10-CM

## 2022-07-05 DIAGNOSIS — R06.02 SOB (SHORTNESS OF BREATH): ICD-10-CM

## 2022-07-05 LAB
ASCENDING AORTA: 3.74 CM
AV INDEX (PROSTH): 0.23
AV MEAN GRADIENT: 32 MMHG
AV PEAK GRADIENT: 52 MMHG
AV VALVE AREA: 1.01 CM2
AV VELOCITY RATIO: 0.23
BSA FOR ECHO PROCEDURE: 2.29 M2
CV ECHO LV RWT: 0.45 CM
DOP CALC AO PEAK VEL: 3.59 M/S
DOP CALC AO VTI: 78 CM
DOP CALC LVOT AREA: 4.4 CM2
DOP CALC LVOT DIAMETER: 2.36 CM
DOP CALC LVOT PEAK VEL: 0.83 M/S
DOP CALC LVOT STROKE VOLUME: 78.7 CM3
DOP CALCLVOT PEAK VEL VTI: 18 CM
E WAVE DECELERATION TIME: 167.75 MSEC
E/A RATIO: 0.52
E/E' RATIO: 8.18 M/S
ECHO LV POSTERIOR WALL: 1.19 CM (ref 0.6–1.1)
EJECTION FRACTION: 60 %
FRACTIONAL SHORTENING: 38 % (ref 28–44)
INTERVENTRICULAR SEPTUM: 1.2 CM (ref 0.6–1.1)
LA MAJOR: 5.27 CM
LA MINOR: 4.66 CM
LA WIDTH: 3.83 CM
LEFT ATRIUM SIZE: 4.52 CM
LEFT ATRIUM VOLUME INDEX MOD: 27.3 ML/M2
LEFT ATRIUM VOLUME INDEX: 32.3 ML/M2
LEFT ATRIUM VOLUME MOD: 61.34 CM3
LEFT ATRIUM VOLUME: 72.78 CM3
LEFT INTERNAL DIMENSION IN SYSTOLE: 3.25 CM (ref 2.1–4)
LEFT VENTRICLE DIASTOLIC VOLUME INDEX: 58.62 ML/M2
LEFT VENTRICLE DIASTOLIC VOLUME: 131.9 ML
LEFT VENTRICLE MASS INDEX: 111 G/M2
LEFT VENTRICLE SYSTOLIC VOLUME INDEX: 18.9 ML/M2
LEFT VENTRICLE SYSTOLIC VOLUME: 42.57 ML
LEFT VENTRICULAR INTERNAL DIMENSION IN DIASTOLE: 5.24 CM (ref 3.5–6)
LEFT VENTRICULAR MASS: 250.46 G
LV LATERAL E/E' RATIO: 7.5 M/S
LV SEPTAL E/E' RATIO: 9 M/S
MV A" WAVE DURATION": 9.71 MSEC
MV PEAK A VEL: 0.86 M/S
MV PEAK E VEL: 0.45 M/S
MV STENOSIS PRESSURE HALF TIME: 48.65 MS
MV VALVE AREA P 1/2 METHOD: 4.52 CM2
PISA TR MAX VEL: 1.79 M/S
PULM VEIN S/D RATIO: 1.35
PV PEAK D VEL: 0.34 M/S
PV PEAK S VEL: 0.46 M/S
RA MAJOR: 4.83 CM
RA PRESSURE: 3 MMHG
RA WIDTH: 3.06 CM
RIGHT VENTRICULAR END-DIASTOLIC DIMENSION: 2.98 CM
RV TISSUE DOPPLER FREE WALL SYSTOLIC VELOCITY 1 (APICAL 4 CHAMBER VIEW): 14.74 CM/S
SINUS: 3.41 CM
STJ: 3.46 CM
TDI LATERAL: 0.06 M/S
TDI SEPTAL: 0.05 M/S
TDI: 0.06 M/S
TR MAX PG: 13 MMHG
TRICUSPID ANNULAR PLANE SYSTOLIC EXCURSION: 2.05 CM
TV REST PULMONARY ARTERY PRESSURE: 16 MMHG

## 2022-07-05 PROCEDURE — 93306 ECHO (CUPID ONLY): ICD-10-PCS | Mod: 26,,, | Performed by: INTERNAL MEDICINE

## 2022-07-05 PROCEDURE — 93306 TTE W/DOPPLER COMPLETE: CPT | Mod: 26,,, | Performed by: INTERNAL MEDICINE

## 2022-07-05 PROCEDURE — 93306 TTE W/DOPPLER COMPLETE: CPT

## 2022-07-06 ENCOUNTER — LAB VISIT (OUTPATIENT)
Dept: LAB | Facility: HOSPITAL | Age: 67
End: 2022-07-06
Attending: INTERNAL MEDICINE
Payer: MEDICARE

## 2022-07-06 DIAGNOSIS — Z94.4 S/P LIVER TRANSPLANT: ICD-10-CM

## 2022-07-06 DIAGNOSIS — E83.42 HYPOMAGNESEMIA: ICD-10-CM

## 2022-07-06 LAB
ALBUMIN SERPL BCP-MCNC: 3.3 G/DL (ref 3.5–5.2)
ALP SERPL-CCNC: 95 U/L (ref 55–135)
ALT SERPL W/O P-5'-P-CCNC: 23 U/L (ref 10–44)
ANION GAP SERPL CALC-SCNC: 11 MMOL/L (ref 8–16)
AST SERPL-CCNC: 31 U/L (ref 10–40)
BASOPHILS # BLD AUTO: 0.04 K/UL (ref 0–0.2)
BASOPHILS NFR BLD: 0.9 % (ref 0–1.9)
BILIRUB SERPL-MCNC: 0.4 MG/DL (ref 0.1–1)
BUN SERPL-MCNC: 17 MG/DL (ref 8–23)
CALCIUM SERPL-MCNC: 9.1 MG/DL (ref 8.7–10.5)
CHLORIDE SERPL-SCNC: 102 MMOL/L (ref 95–110)
CO2 SERPL-SCNC: 26 MMOL/L (ref 23–29)
CREAT SERPL-MCNC: 1 MG/DL (ref 0.5–1.4)
DIFFERENTIAL METHOD: ABNORMAL
EOSINOPHIL # BLD AUTO: 0.2 K/UL (ref 0–0.5)
EOSINOPHIL NFR BLD: 3.8 % (ref 0–8)
ERYTHROCYTE [DISTWIDTH] IN BLOOD BY AUTOMATED COUNT: 18 % (ref 11.5–14.5)
EST. GFR  (AFRICAN AMERICAN): >60 ML/MIN/1.73 M^2
EST. GFR  (NON AFRICAN AMERICAN): >60 ML/MIN/1.73 M^2
GLUCOSE SERPL-MCNC: 163 MG/DL (ref 70–110)
HCT VFR BLD AUTO: 33.5 % (ref 40–54)
HGB BLD-MCNC: 9.3 G/DL (ref 14–18)
IMM GRANULOCYTES # BLD AUTO: 0.02 K/UL (ref 0–0.04)
IMM GRANULOCYTES NFR BLD AUTO: 0.4 % (ref 0–0.5)
LYMPHOCYTES # BLD AUTO: 0.8 K/UL (ref 1–4.8)
LYMPHOCYTES NFR BLD: 17.1 % (ref 18–48)
MAGNESIUM SERPL-MCNC: 1.7 MG/DL (ref 1.6–2.6)
MCH RBC QN AUTO: 21.2 PG (ref 27–31)
MCHC RBC AUTO-ENTMCNC: 27.8 G/DL (ref 32–36)
MCV RBC AUTO: 77 FL (ref 82–98)
MONOCYTES # BLD AUTO: 0.6 K/UL (ref 0.3–1)
MONOCYTES NFR BLD: 12 % (ref 4–15)
NEUTROPHILS # BLD AUTO: 3.1 K/UL (ref 1.8–7.7)
NEUTROPHILS NFR BLD: 65.8 % (ref 38–73)
NRBC BLD-RTO: 0 /100 WBC
PLATELET # BLD AUTO: 157 K/UL (ref 150–450)
PMV BLD AUTO: 11 FL (ref 9.2–12.9)
POTASSIUM SERPL-SCNC: 4.1 MMOL/L (ref 3.5–5.1)
PROT SERPL-MCNC: 6.4 G/DL (ref 6–8.4)
RBC # BLD AUTO: 4.38 M/UL (ref 4.6–6.2)
SODIUM SERPL-SCNC: 139 MMOL/L (ref 136–145)
WBC # BLD AUTO: 4.68 K/UL (ref 3.9–12.7)

## 2022-07-06 PROCEDURE — 80197 ASSAY OF TACROLIMUS: CPT | Performed by: INTERNAL MEDICINE

## 2022-07-06 PROCEDURE — 80053 COMPREHEN METABOLIC PANEL: CPT | Performed by: INTERNAL MEDICINE

## 2022-07-06 PROCEDURE — 83735 ASSAY OF MAGNESIUM: CPT | Performed by: INTERNAL MEDICINE

## 2022-07-06 PROCEDURE — 85025 COMPLETE CBC W/AUTO DIFF WBC: CPT | Performed by: INTERNAL MEDICINE

## 2022-07-07 ENCOUNTER — TELEPHONE (OUTPATIENT)
Dept: TRANSPLANT | Facility: CLINIC | Age: 67
End: 2022-07-07
Payer: MEDICARE

## 2022-07-07 ENCOUNTER — PATIENT MESSAGE (OUTPATIENT)
Dept: TRANSPLANT | Facility: CLINIC | Age: 67
End: 2022-07-07
Payer: MEDICARE

## 2022-07-07 LAB — TACROLIMUS BLD-MCNC: 6.9 NG/ML (ref 5–15)

## 2022-07-07 NOTE — TELEPHONE ENCOUNTER
Sent patient message via portal to let him know labs are stable.  No medication changes, next labs due on 7/18/22      ----- Message from Eula Castle MD sent at 7/7/2022  3:33 AM CDT -----  Please inform patient results are OK. Continue routine labs.

## 2022-07-08 ENCOUNTER — TELEPHONE (OUTPATIENT)
Dept: TRANSPLANT | Facility: CLINIC | Age: 67
End: 2022-07-08
Payer: MEDICARE

## 2022-07-08 NOTE — TELEPHONE ENCOUNTER
Patient already being followed by Hematology/Oncology    ----- Message from Eula Castle MD sent at 7/8/2022  6:59 AM CDT -----  Chronic anemia - has he been evaluated by hematology, and for GI blood loss? His iron saturation was very low (2) in 2/2022. If not, give appt. For GI eval as well.

## 2022-07-13 ENCOUNTER — PATIENT MESSAGE (OUTPATIENT)
Dept: PRIMARY CARE CLINIC | Facility: CLINIC | Age: 67
End: 2022-07-13
Payer: MEDICARE

## 2022-07-13 DIAGNOSIS — K76.6 PORTAL HYPERTENSION: ICD-10-CM

## 2022-07-13 DIAGNOSIS — Z76.82 AWAITING LIVER TRANSPLANT: ICD-10-CM

## 2022-07-13 DIAGNOSIS — K74.69 OTHER CIRRHOSIS OF LIVER: ICD-10-CM

## 2022-07-13 RX ORDER — PANTOPRAZOLE SODIUM 40 MG/1
40 TABLET, DELAYED RELEASE ORAL DAILY
Qty: 30 TABLET | Refills: 5 | Status: SHIPPED | OUTPATIENT
Start: 2022-07-13 | End: 2023-01-04 | Stop reason: SDUPTHER

## 2022-07-15 ENCOUNTER — TELEPHONE (OUTPATIENT)
Dept: TRANSPLANT | Facility: CLINIC | Age: 67
End: 2022-07-15
Payer: MEDICARE

## 2022-07-18 ENCOUNTER — LAB VISIT (OUTPATIENT)
Dept: LAB | Facility: HOSPITAL | Age: 67
End: 2022-07-18
Attending: INTERNAL MEDICINE
Payer: MEDICARE

## 2022-07-18 DIAGNOSIS — E83.42 HYPOMAGNESEMIA: ICD-10-CM

## 2022-07-18 DIAGNOSIS — Z94.4 S/P LIVER TRANSPLANT: ICD-10-CM

## 2022-07-18 LAB
ALBUMIN SERPL BCP-MCNC: 3.5 G/DL (ref 3.5–5.2)
ALP SERPL-CCNC: 95 U/L (ref 55–135)
ALT SERPL W/O P-5'-P-CCNC: 17 U/L (ref 10–44)
ANION GAP SERPL CALC-SCNC: 12 MMOL/L (ref 8–16)
AST SERPL-CCNC: 25 U/L (ref 10–40)
BASOPHILS # BLD AUTO: 0.06 K/UL (ref 0–0.2)
BASOPHILS NFR BLD: 1.1 % (ref 0–1.9)
BILIRUB SERPL-MCNC: 0.6 MG/DL (ref 0.1–1)
BUN SERPL-MCNC: 17 MG/DL (ref 8–23)
CALCIUM SERPL-MCNC: 9.4 MG/DL (ref 8.7–10.5)
CHLORIDE SERPL-SCNC: 100 MMOL/L (ref 95–110)
CO2 SERPL-SCNC: 30 MMOL/L (ref 23–29)
CREAT SERPL-MCNC: 1 MG/DL (ref 0.5–1.4)
DIFFERENTIAL METHOD: ABNORMAL
EOSINOPHIL # BLD AUTO: 0.1 K/UL (ref 0–0.5)
EOSINOPHIL NFR BLD: 2.5 % (ref 0–8)
ERYTHROCYTE [DISTWIDTH] IN BLOOD BY AUTOMATED COUNT: 18.2 % (ref 11.5–14.5)
EST. GFR  (AFRICAN AMERICAN): >60 ML/MIN/1.73 M^2
EST. GFR  (NON AFRICAN AMERICAN): >60 ML/MIN/1.73 M^2
GLUCOSE SERPL-MCNC: 94 MG/DL (ref 70–110)
HCT VFR BLD AUTO: 36.3 % (ref 40–54)
HGB BLD-MCNC: 10.1 G/DL (ref 14–18)
IMM GRANULOCYTES # BLD AUTO: 0.02 K/UL (ref 0–0.04)
IMM GRANULOCYTES NFR BLD AUTO: 0.4 % (ref 0–0.5)
LYMPHOCYTES # BLD AUTO: 0.9 K/UL (ref 1–4.8)
LYMPHOCYTES NFR BLD: 16.6 % (ref 18–48)
MAGNESIUM SERPL-MCNC: 2 MG/DL (ref 1.6–2.6)
MCH RBC QN AUTO: 20.8 PG (ref 27–31)
MCHC RBC AUTO-ENTMCNC: 27.8 G/DL (ref 32–36)
MCV RBC AUTO: 75 FL (ref 82–98)
MONOCYTES # BLD AUTO: 0.7 K/UL (ref 0.3–1)
MONOCYTES NFR BLD: 11.7 % (ref 4–15)
NEUTROPHILS # BLD AUTO: 3.8 K/UL (ref 1.8–7.7)
NEUTROPHILS NFR BLD: 67.7 % (ref 38–73)
NRBC BLD-RTO: 0 /100 WBC
PLATELET # BLD AUTO: 194 K/UL (ref 150–450)
PMV BLD AUTO: 10.5 FL (ref 9.2–12.9)
POTASSIUM SERPL-SCNC: 3.9 MMOL/L (ref 3.5–5.1)
PROT SERPL-MCNC: 7 G/DL (ref 6–8.4)
RBC # BLD AUTO: 4.86 M/UL (ref 4.6–6.2)
SODIUM SERPL-SCNC: 142 MMOL/L (ref 136–145)
WBC # BLD AUTO: 5.54 K/UL (ref 3.9–12.7)

## 2022-07-18 PROCEDURE — 80197 ASSAY OF TACROLIMUS: CPT | Performed by: INTERNAL MEDICINE

## 2022-07-18 PROCEDURE — 80053 COMPREHEN METABOLIC PANEL: CPT | Performed by: INTERNAL MEDICINE

## 2022-07-18 PROCEDURE — 36415 COLL VENOUS BLD VENIPUNCTURE: CPT | Mod: PN | Performed by: INTERNAL MEDICINE

## 2022-07-18 PROCEDURE — 85025 COMPLETE CBC W/AUTO DIFF WBC: CPT | Performed by: INTERNAL MEDICINE

## 2022-07-18 PROCEDURE — 83735 ASSAY OF MAGNESIUM: CPT | Performed by: INTERNAL MEDICINE

## 2022-07-19 LAB — TACROLIMUS BLD-MCNC: 4.2 NG/ML (ref 5–15)

## 2022-07-22 ENCOUNTER — TELEPHONE (OUTPATIENT)
Dept: TRANSPLANT | Facility: CLINIC | Age: 67
End: 2022-07-22
Payer: MEDICARE

## 2022-07-22 ENCOUNTER — PATIENT MESSAGE (OUTPATIENT)
Dept: TRANSPLANT | Facility: CLINIC | Age: 67
End: 2022-07-22
Payer: MEDICARE

## 2022-07-22 NOTE — TELEPHONE ENCOUNTER
Sent patient message via portal to let him know labs are stable.  No medication changes, next labs due on 8/1/22      ----- Message from Thais Maxwell MD sent at 7/22/2022  4:35 PM CDT -----  The Labs are stable - please let patient know.

## 2022-07-28 ENCOUNTER — HOSPITAL ENCOUNTER (OUTPATIENT)
Dept: RADIOLOGY | Facility: HOSPITAL | Age: 67
Discharge: HOME | End: 2022-07-28
Attending: INTERNAL MEDICINE
Payer: MEDICARE

## 2022-07-28 ENCOUNTER — PATIENT MESSAGE (OUTPATIENT)
Dept: TRANSPLANT | Facility: CLINIC | Age: 67
End: 2022-07-28
Payer: MEDICARE

## 2022-07-28 ENCOUNTER — TELEPHONE (OUTPATIENT)
Dept: TRANSPLANT | Facility: CLINIC | Age: 67
End: 2022-07-28
Payer: MEDICARE

## 2022-07-28 DIAGNOSIS — Z94.4 LIVER REPLACED BY TRANSPLANT: ICD-10-CM

## 2022-07-28 PROCEDURE — 93976 US DOPPLER LIVER TRANSPLANT POST (XPD): ICD-10-PCS | Mod: 26,,, | Performed by: RADIOLOGY

## 2022-07-28 PROCEDURE — 76705 ECHO EXAM OF ABDOMEN: CPT | Mod: 59,TC

## 2022-07-28 PROCEDURE — 93976 VASCULAR STUDY: CPT | Mod: 26,,, | Performed by: RADIOLOGY

## 2022-07-28 PROCEDURE — 93976 VASCULAR STUDY: CPT | Mod: TC

## 2022-07-28 PROCEDURE — 76705 US DOPPLER LIVER TRANSPLANT POST (XPD): ICD-10-PCS | Mod: 26,XS,, | Performed by: RADIOLOGY

## 2022-07-28 PROCEDURE — 76705 ECHO EXAM OF ABDOMEN: CPT | Mod: 26,XS,, | Performed by: RADIOLOGY

## 2022-07-28 NOTE — TELEPHONE ENCOUNTER
Sent message to let patient know.    ----- Message from Thais Maxwell MD sent at 7/28/2022 10:03 AM CDT -----  Doppler ok and less fluid

## 2022-08-01 ENCOUNTER — LAB VISIT (OUTPATIENT)
Dept: LAB | Facility: HOSPITAL | Age: 67
End: 2022-08-01
Attending: INTERNAL MEDICINE
Payer: MEDICARE

## 2022-08-01 DIAGNOSIS — E83.42 HYPOMAGNESEMIA: ICD-10-CM

## 2022-08-01 DIAGNOSIS — Z94.4 S/P LIVER TRANSPLANT: ICD-10-CM

## 2022-08-01 LAB
ALBUMIN SERPL BCP-MCNC: 3.6 G/DL (ref 3.5–5.2)
ALP SERPL-CCNC: 85 U/L (ref 55–135)
ALT SERPL W/O P-5'-P-CCNC: 21 U/L (ref 10–44)
ANION GAP SERPL CALC-SCNC: 8 MMOL/L (ref 8–16)
AST SERPL-CCNC: 28 U/L (ref 10–40)
BASOPHILS # BLD AUTO: 0.06 K/UL (ref 0–0.2)
BASOPHILS NFR BLD: 0.9 % (ref 0–1.9)
BILIRUB SERPL-MCNC: 0.6 MG/DL (ref 0.1–1)
BUN SERPL-MCNC: 15 MG/DL (ref 8–23)
CALCIUM SERPL-MCNC: 9.1 MG/DL (ref 8.7–10.5)
CHLORIDE SERPL-SCNC: 102 MMOL/L (ref 95–110)
CO2 SERPL-SCNC: 30 MMOL/L (ref 23–29)
CREAT SERPL-MCNC: 1 MG/DL (ref 0.5–1.4)
DIFFERENTIAL METHOD: ABNORMAL
EOSINOPHIL # BLD AUTO: 0.2 K/UL (ref 0–0.5)
EOSINOPHIL NFR BLD: 3 % (ref 0–8)
ERYTHROCYTE [DISTWIDTH] IN BLOOD BY AUTOMATED COUNT: 17.6 % (ref 11.5–14.5)
EST. GFR  (NO RACE VARIABLE): >60 ML/MIN/1.73 M^2
GLUCOSE SERPL-MCNC: 124 MG/DL (ref 70–110)
HCT VFR BLD AUTO: 35.5 % (ref 40–54)
HGB BLD-MCNC: 10 G/DL (ref 14–18)
IMM GRANULOCYTES # BLD AUTO: 0.02 K/UL (ref 0–0.04)
IMM GRANULOCYTES NFR BLD AUTO: 0.3 % (ref 0–0.5)
LYMPHOCYTES # BLD AUTO: 1.1 K/UL (ref 1–4.8)
LYMPHOCYTES NFR BLD: 17.3 % (ref 18–48)
MAGNESIUM SERPL-MCNC: 1.7 MG/DL (ref 1.6–2.6)
MCH RBC QN AUTO: 21.6 PG (ref 27–31)
MCHC RBC AUTO-ENTMCNC: 28.2 G/DL (ref 32–36)
MCV RBC AUTO: 77 FL (ref 82–98)
MONOCYTES # BLD AUTO: 0.8 K/UL (ref 0.3–1)
MONOCYTES NFR BLD: 12.4 % (ref 4–15)
NEUTROPHILS # BLD AUTO: 4.2 K/UL (ref 1.8–7.7)
NEUTROPHILS NFR BLD: 66.1 % (ref 38–73)
NRBC BLD-RTO: 0 /100 WBC
PLATELET # BLD AUTO: 203 K/UL (ref 150–450)
PMV BLD AUTO: 11.6 FL (ref 9.2–12.9)
POTASSIUM SERPL-SCNC: 3.9 MMOL/L (ref 3.5–5.1)
PROT SERPL-MCNC: 6.8 G/DL (ref 6–8.4)
RBC # BLD AUTO: 4.64 M/UL (ref 4.6–6.2)
SODIUM SERPL-SCNC: 140 MMOL/L (ref 136–145)
WBC # BLD AUTO: 6.35 K/UL (ref 3.9–12.7)

## 2022-08-01 PROCEDURE — 83735 ASSAY OF MAGNESIUM: CPT | Performed by: INTERNAL MEDICINE

## 2022-08-01 PROCEDURE — 80197 ASSAY OF TACROLIMUS: CPT | Performed by: INTERNAL MEDICINE

## 2022-08-01 PROCEDURE — 80053 COMPREHEN METABOLIC PANEL: CPT | Performed by: INTERNAL MEDICINE

## 2022-08-01 PROCEDURE — 85025 COMPLETE CBC W/AUTO DIFF WBC: CPT | Performed by: INTERNAL MEDICINE

## 2022-08-01 PROCEDURE — 36415 COLL VENOUS BLD VENIPUNCTURE: CPT | Mod: PN | Performed by: INTERNAL MEDICINE

## 2022-08-02 LAB — TACROLIMUS BLD-MCNC: 4.6 NG/ML (ref 5–15)

## 2022-08-03 ENCOUNTER — PATIENT MESSAGE (OUTPATIENT)
Dept: TRANSPLANT | Facility: CLINIC | Age: 67
End: 2022-08-03
Payer: MEDICARE

## 2022-08-03 ENCOUNTER — TELEPHONE (OUTPATIENT)
Dept: TRANSPLANT | Facility: CLINIC | Age: 67
End: 2022-08-03
Payer: MEDICARE

## 2022-08-03 NOTE — TELEPHONE ENCOUNTER
Sent patient message via portal to let him know labs are stable.  No medication changes, next labs due on 08/15/22      ----- Message from Thais Maxwell MD sent at 8/2/2022  4:59 PM CDT -----  The Labs are stable - please let patient know.

## 2022-08-15 ENCOUNTER — LAB VISIT (OUTPATIENT)
Dept: LAB | Facility: HOSPITAL | Age: 67
End: 2022-08-15
Attending: INTERNAL MEDICINE
Payer: MEDICARE

## 2022-08-15 DIAGNOSIS — Z94.4 S/P LIVER TRANSPLANT: ICD-10-CM

## 2022-08-15 DIAGNOSIS — E83.42 HYPOMAGNESEMIA: ICD-10-CM

## 2022-08-15 LAB
ALBUMIN SERPL BCP-MCNC: 3.8 G/DL (ref 3.5–5.2)
ALP SERPL-CCNC: 86 U/L (ref 55–135)
ALT SERPL W/O P-5'-P-CCNC: 16 U/L (ref 10–44)
ANION GAP SERPL CALC-SCNC: 13 MMOL/L (ref 8–16)
AST SERPL-CCNC: 27 U/L (ref 10–40)
BASOPHILS # BLD AUTO: 0.04 K/UL (ref 0–0.2)
BASOPHILS NFR BLD: 0.6 % (ref 0–1.9)
BILIRUB SERPL-MCNC: 0.6 MG/DL (ref 0.1–1)
BUN SERPL-MCNC: 20 MG/DL (ref 8–23)
CALCIUM SERPL-MCNC: 9.4 MG/DL (ref 8.7–10.5)
CHLORIDE SERPL-SCNC: 101 MMOL/L (ref 95–110)
CO2 SERPL-SCNC: 26 MMOL/L (ref 23–29)
CREAT SERPL-MCNC: 1 MG/DL (ref 0.5–1.4)
DIFFERENTIAL METHOD: ABNORMAL
EOSINOPHIL # BLD AUTO: 0.1 K/UL (ref 0–0.5)
EOSINOPHIL NFR BLD: 2 % (ref 0–8)
ERYTHROCYTE [DISTWIDTH] IN BLOOD BY AUTOMATED COUNT: 17.4 % (ref 11.5–14.5)
EST. GFR  (NO RACE VARIABLE): >60 ML/MIN/1.73 M^2
GLUCOSE SERPL-MCNC: 139 MG/DL (ref 70–110)
HCT VFR BLD AUTO: 35.6 % (ref 40–54)
HGB BLD-MCNC: 10.3 G/DL (ref 14–18)
IMM GRANULOCYTES # BLD AUTO: 0.02 K/UL (ref 0–0.04)
IMM GRANULOCYTES NFR BLD AUTO: 0.3 % (ref 0–0.5)
LYMPHOCYTES # BLD AUTO: 0.9 K/UL (ref 1–4.8)
LYMPHOCYTES NFR BLD: 14.3 % (ref 18–48)
MAGNESIUM SERPL-MCNC: 1.5 MG/DL (ref 1.6–2.6)
MCH RBC QN AUTO: 21.4 PG (ref 27–31)
MCHC RBC AUTO-ENTMCNC: 28.9 G/DL (ref 32–36)
MCV RBC AUTO: 74 FL (ref 82–98)
MONOCYTES # BLD AUTO: 0.7 K/UL (ref 0.3–1)
MONOCYTES NFR BLD: 11.3 % (ref 4–15)
NEUTROPHILS # BLD AUTO: 4.6 K/UL (ref 1.8–7.7)
NEUTROPHILS NFR BLD: 71.5 % (ref 38–73)
NRBC BLD-RTO: 0 /100 WBC
PLATELET # BLD AUTO: 170 K/UL (ref 150–450)
PMV BLD AUTO: 11.4 FL (ref 9.2–12.9)
POTASSIUM SERPL-SCNC: 4.2 MMOL/L (ref 3.5–5.1)
PROT SERPL-MCNC: 7.1 G/DL (ref 6–8.4)
RBC # BLD AUTO: 4.82 M/UL (ref 4.6–6.2)
SODIUM SERPL-SCNC: 140 MMOL/L (ref 136–145)
WBC # BLD AUTO: 6.45 K/UL (ref 3.9–12.7)

## 2022-08-15 PROCEDURE — 80053 COMPREHEN METABOLIC PANEL: CPT | Performed by: INTERNAL MEDICINE

## 2022-08-15 PROCEDURE — 80197 ASSAY OF TACROLIMUS: CPT | Performed by: INTERNAL MEDICINE

## 2022-08-15 PROCEDURE — 85025 COMPLETE CBC W/AUTO DIFF WBC: CPT | Performed by: INTERNAL MEDICINE

## 2022-08-15 PROCEDURE — 83735 ASSAY OF MAGNESIUM: CPT | Performed by: INTERNAL MEDICINE

## 2022-08-15 PROCEDURE — 36415 COLL VENOUS BLD VENIPUNCTURE: CPT | Mod: PN | Performed by: INTERNAL MEDICINE

## 2022-08-16 LAB — TACROLIMUS BLD-MCNC: 5.8 NG/ML (ref 5–15)

## 2022-08-17 ENCOUNTER — TELEPHONE (OUTPATIENT)
Dept: TRANSPLANT | Facility: CLINIC | Age: 67
End: 2022-08-17
Payer: MEDICARE

## 2022-08-17 ENCOUNTER — PATIENT MESSAGE (OUTPATIENT)
Dept: TRANSPLANT | Facility: CLINIC | Age: 67
End: 2022-08-17
Payer: MEDICARE

## 2022-08-17 NOTE — TELEPHONE ENCOUNTER
Sent patient message via portal to let him know labs are stable.  No medication changes, next labs due on 8/29/22      ----- Message from Thais Maxwell MD sent at 8/16/2022  8:18 PM CDT -----  The Labs are stable; repeat labs in 2 weeks; prograf level a bit high- await repeat labs - please let patient know.

## 2022-08-19 ENCOUNTER — HOSPITAL ENCOUNTER (OUTPATIENT)
Dept: RADIOLOGY | Facility: HOSPITAL | Age: 67
Discharge: HOME | End: 2022-08-19
Attending: INTERNAL MEDICINE
Payer: MEDICARE

## 2022-08-19 DIAGNOSIS — C22.1 CHOLANGIOCARCINOMA: ICD-10-CM

## 2022-08-19 PROCEDURE — 74176 CT ABD & PELVIS W/O CONTRAST: CPT | Mod: 26,,, | Performed by: RADIOLOGY

## 2022-08-19 PROCEDURE — 71250 CT THORAX DX C-: CPT | Mod: 26,,, | Performed by: RADIOLOGY

## 2022-08-19 PROCEDURE — 71250 CT CHEST ABDOMEN PELVIS WITHOUT CONTRAST(XPD): ICD-10-PCS | Mod: 26,,, | Performed by: RADIOLOGY

## 2022-08-19 PROCEDURE — 74176 CT CHEST ABDOMEN PELVIS WITHOUT CONTRAST(XPD): ICD-10-PCS | Mod: 26,,, | Performed by: RADIOLOGY

## 2022-08-19 PROCEDURE — 74176 CT ABD & PELVIS W/O CONTRAST: CPT | Mod: TC

## 2022-08-22 ENCOUNTER — OFFICE VISIT (OUTPATIENT)
Dept: TRANSPLANT | Facility: CLINIC | Age: 67
End: 2022-08-22
Payer: MEDICARE

## 2022-08-22 ENCOUNTER — LAB VISIT (OUTPATIENT)
Dept: LAB | Facility: HOSPITAL | Age: 67
End: 2022-08-22
Attending: INTERNAL MEDICINE
Payer: MEDICARE

## 2022-08-22 ENCOUNTER — OFFICE VISIT (OUTPATIENT)
Dept: HEMATOLOGY/ONCOLOGY | Facility: CLINIC | Age: 67
End: 2022-08-22
Payer: MEDICARE

## 2022-08-22 VITALS
HEIGHT: 72 IN | BODY MASS INDEX: 28.92 KG/M2 | SYSTOLIC BLOOD PRESSURE: 142 MMHG | TEMPERATURE: 98 F | OXYGEN SATURATION: 97 % | RESPIRATION RATE: 18 BRPM | HEART RATE: 70 BPM | WEIGHT: 213.5 LBS | DIASTOLIC BLOOD PRESSURE: 71 MMHG

## 2022-08-22 VITALS
SYSTOLIC BLOOD PRESSURE: 156 MMHG | RESPIRATION RATE: 18 BRPM | HEART RATE: 75 BPM | OXYGEN SATURATION: 95 % | HEIGHT: 72 IN | DIASTOLIC BLOOD PRESSURE: 74 MMHG | TEMPERATURE: 98 F | BODY MASS INDEX: 28.88 KG/M2 | WEIGHT: 213.19 LBS

## 2022-08-22 DIAGNOSIS — C22.1 CHOLANGIOCARCINOMA: Primary | ICD-10-CM

## 2022-08-22 DIAGNOSIS — Z94.4 S/P LIVER TRANSPLANT: ICD-10-CM

## 2022-08-22 DIAGNOSIS — C22.1 CHOLANGIOCARCINOMA: ICD-10-CM

## 2022-08-22 DIAGNOSIS — Z91.89 AT RISK FOR OPPORTUNISTIC INFECTIONS: ICD-10-CM

## 2022-08-22 DIAGNOSIS — E66.9 CLASS 1 OBESITY WITH SERIOUS COMORBIDITY AND BODY MASS INDEX (BMI) OF 31.0 TO 31.9 IN ADULT, UNSPECIFIED OBESITY TYPE: ICD-10-CM

## 2022-08-22 DIAGNOSIS — D84.821 IMMUNODEFICIENCY DUE TO DRUGS: ICD-10-CM

## 2022-08-22 DIAGNOSIS — R18.8 OTHER ASCITES: ICD-10-CM

## 2022-08-22 DIAGNOSIS — D63.0 ANEMIA IN NEOPLASTIC DISEASE: ICD-10-CM

## 2022-08-22 DIAGNOSIS — R06.02 SOB (SHORTNESS OF BREATH): ICD-10-CM

## 2022-08-22 DIAGNOSIS — I48.19 PERSISTENT ATRIAL FIBRILLATION: ICD-10-CM

## 2022-08-22 DIAGNOSIS — E78.5 HYPERLIPIDEMIA, UNSPECIFIED HYPERLIPIDEMIA TYPE: ICD-10-CM

## 2022-08-22 DIAGNOSIS — I73.9 PAD (PERIPHERAL ARTERY DISEASE): ICD-10-CM

## 2022-08-22 DIAGNOSIS — E11.42 DIABETIC POLYNEUROPATHY ASSOCIATED WITH TYPE 2 DIABETES MELLITUS: ICD-10-CM

## 2022-08-22 DIAGNOSIS — Z79.899 IMMUNODEFICIENCY DUE TO DRUGS: ICD-10-CM

## 2022-08-22 DIAGNOSIS — Z79.60 LONG-TERM USE OF IMMUNOSUPPRESSANT MEDICATION: Primary | ICD-10-CM

## 2022-08-22 LAB
ALBUMIN SERPL BCP-MCNC: 3.7 G/DL (ref 3.5–5.2)
ALP SERPL-CCNC: 90 U/L (ref 55–135)
ALT SERPL W/O P-5'-P-CCNC: 17 U/L (ref 10–44)
ANION GAP SERPL CALC-SCNC: 9 MMOL/L (ref 8–16)
AST SERPL-CCNC: 24 U/L (ref 10–40)
BILIRUB SERPL-MCNC: 0.6 MG/DL (ref 0.1–1)
BUN SERPL-MCNC: 18 MG/DL (ref 8–23)
CALCIUM SERPL-MCNC: 9.2 MG/DL (ref 8.7–10.5)
CANCER AG19-9 SERPL-ACNC: <2.1 U/ML (ref 0–40)
CHLORIDE SERPL-SCNC: 101 MMOL/L (ref 95–110)
CO2 SERPL-SCNC: 30 MMOL/L (ref 23–29)
CREAT SERPL-MCNC: 1.2 MG/DL (ref 0.5–1.4)
ERYTHROCYTE [DISTWIDTH] IN BLOOD BY AUTOMATED COUNT: 17 % (ref 11.5–14.5)
EST. GFR  (NO RACE VARIABLE): >60 ML/MIN/1.73 M^2
GLUCOSE SERPL-MCNC: 150 MG/DL (ref 70–110)
HCT VFR BLD AUTO: 34.5 % (ref 40–54)
HGB BLD-MCNC: 9.7 G/DL (ref 14–18)
IMM GRANULOCYTES # BLD AUTO: 0.02 K/UL (ref 0–0.04)
MCH RBC QN AUTO: 21 PG (ref 27–31)
MCHC RBC AUTO-ENTMCNC: 28.1 G/DL (ref 32–36)
MCV RBC AUTO: 75 FL (ref 82–98)
NEUTROPHILS # BLD AUTO: 4.3 K/UL (ref 1.8–7.7)
PLATELET # BLD AUTO: 189 K/UL (ref 150–450)
PMV BLD AUTO: 11.3 FL (ref 9.2–12.9)
POTASSIUM SERPL-SCNC: 3.9 MMOL/L (ref 3.5–5.1)
PROT SERPL-MCNC: 7 G/DL (ref 6–8.4)
RBC # BLD AUTO: 4.62 M/UL (ref 4.6–6.2)
SODIUM SERPL-SCNC: 140 MMOL/L (ref 136–145)
WBC # BLD AUTO: 6.37 K/UL (ref 3.9–12.7)

## 2022-08-22 PROCEDURE — 85027 COMPLETE CBC AUTOMATED: CPT | Performed by: INTERNAL MEDICINE

## 2022-08-22 PROCEDURE — 99999 PR PBB SHADOW E&M-EST. PATIENT-LVL V: CPT | Mod: PBBFAC,,, | Performed by: INTERNAL MEDICINE

## 2022-08-22 PROCEDURE — 86301 IMMUNOASSAY TUMOR CA 19-9: CPT | Performed by: INTERNAL MEDICINE

## 2022-08-22 PROCEDURE — 1159F PR MEDICATION LIST DOCUMENTED IN MEDICAL RECORD: ICD-10-PCS | Mod: CPTII,S$GLB,, | Performed by: INTERNAL MEDICINE

## 2022-08-22 PROCEDURE — 3008F PR BODY MASS INDEX (BMI) DOCUMENTED: ICD-10-PCS | Mod: CPTII,S$GLB,, | Performed by: INTERNAL MEDICINE

## 2022-08-22 PROCEDURE — 99999 PR PBB SHADOW E&M-EST. PATIENT-LVL V: ICD-10-PCS | Mod: PBBFAC,,, | Performed by: INTERNAL MEDICINE

## 2022-08-22 PROCEDURE — 3077F SYST BP >= 140 MM HG: CPT | Mod: CPTII,S$GLB,, | Performed by: INTERNAL MEDICINE

## 2022-08-22 PROCEDURE — 1101F PR PT FALLS ASSESS DOC 0-1 FALLS W/OUT INJ PAST YR: ICD-10-PCS | Mod: CPTII,S$GLB,, | Performed by: INTERNAL MEDICINE

## 2022-08-22 PROCEDURE — 1159F MED LIST DOCD IN RCRD: CPT | Mod: CPTII,S$GLB,, | Performed by: INTERNAL MEDICINE

## 2022-08-22 PROCEDURE — 3078F PR MOST RECENT DIASTOLIC BLOOD PRESSURE < 80 MM HG: ICD-10-PCS | Mod: CPTII,S$GLB,, | Performed by: INTERNAL MEDICINE

## 2022-08-22 PROCEDURE — 3078F DIAST BP <80 MM HG: CPT | Mod: CPTII,S$GLB,, | Performed by: INTERNAL MEDICINE

## 2022-08-22 PROCEDURE — 4010F PR ACE/ARB THEARPY RXD/TAKEN: ICD-10-PCS | Mod: CPTII,S$GLB,, | Performed by: INTERNAL MEDICINE

## 2022-08-22 PROCEDURE — 80053 COMPREHEN METABOLIC PANEL: CPT | Performed by: INTERNAL MEDICINE

## 2022-08-22 PROCEDURE — 3044F HG A1C LEVEL LT 7.0%: CPT | Mod: CPTII,S$GLB,, | Performed by: INTERNAL MEDICINE

## 2022-08-22 PROCEDURE — 99214 OFFICE O/P EST MOD 30 MIN: CPT | Mod: S$GLB,,, | Performed by: INTERNAL MEDICINE

## 2022-08-22 PROCEDURE — 3008F BODY MASS INDEX DOCD: CPT | Mod: CPTII,S$GLB,, | Performed by: INTERNAL MEDICINE

## 2022-08-22 PROCEDURE — 4010F ACE/ARB THERAPY RXD/TAKEN: CPT | Mod: CPTII,S$GLB,, | Performed by: INTERNAL MEDICINE

## 2022-08-22 PROCEDURE — 1126F PR PAIN SEVERITY QUANTIFIED, NO PAIN PRESENT: ICD-10-PCS | Mod: CPTII,S$GLB,, | Performed by: INTERNAL MEDICINE

## 2022-08-22 PROCEDURE — 3077F PR MOST RECENT SYSTOLIC BLOOD PRESSURE >= 140 MM HG: ICD-10-PCS | Mod: CPTII,S$GLB,, | Performed by: INTERNAL MEDICINE

## 2022-08-22 PROCEDURE — 1126F AMNT PAIN NOTED NONE PRSNT: CPT | Mod: CPTII,S$GLB,, | Performed by: INTERNAL MEDICINE

## 2022-08-22 PROCEDURE — 99214 PR OFFICE/OUTPT VISIT, EST, LEVL IV, 30-39 MIN: ICD-10-PCS | Mod: S$GLB,,, | Performed by: INTERNAL MEDICINE

## 2022-08-22 PROCEDURE — 1160F RVW MEDS BY RX/DR IN RCRD: CPT | Mod: CPTII,S$GLB,, | Performed by: INTERNAL MEDICINE

## 2022-08-22 PROCEDURE — 1101F PT FALLS ASSESS-DOCD LE1/YR: CPT | Mod: CPTII,S$GLB,, | Performed by: INTERNAL MEDICINE

## 2022-08-22 PROCEDURE — 36415 COLL VENOUS BLD VENIPUNCTURE: CPT | Performed by: INTERNAL MEDICINE

## 2022-08-22 PROCEDURE — 3288F PR FALLS RISK ASSESSMENT DOCUMENTED: ICD-10-PCS | Mod: CPTII,S$GLB,, | Performed by: INTERNAL MEDICINE

## 2022-08-22 PROCEDURE — 3044F PR MOST RECENT HEMOGLOBIN A1C LEVEL <7.0%: ICD-10-PCS | Mod: CPTII,S$GLB,, | Performed by: INTERNAL MEDICINE

## 2022-08-22 PROCEDURE — 1160F PR REVIEW ALL MEDS BY PRESCRIBER/CLIN PHARMACIST DOCUMENTED: ICD-10-PCS | Mod: CPTII,S$GLB,, | Performed by: INTERNAL MEDICINE

## 2022-08-22 PROCEDURE — 3288F FALL RISK ASSESSMENT DOCD: CPT | Mod: CPTII,S$GLB,, | Performed by: INTERNAL MEDICINE

## 2022-08-22 NOTE — PROGRESS NOTES
Transplant Hepatology  Liver Transplant Recipient Follow-up    Transplant Date: 1/6/2022  UNOS Native Liver Dx: Primary Liver Malignancy: Hepatoma (HCC) and Cirrhosis    Tej is here for follow up of his liver transplant.    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     Subjective:     Interval History: Tej is now 9 months post liver transplant. Currently, he overall much better. He has better energy and is less fatigued. He remains on 40 mg of lasix per day, but is no longer retaining as much fluid as before. Lat paracentesis was 6/23/22.    Allograft function 8/22/22: ALT 17, AST 24, ALKP 90, Tbil 0.6, creat 1.2, prograf level pending (5.8 on 8/15/22), CA 19-9 <2.0  IS: prograf and cellcept 500/500  Immunoprophylaxis:  PCP PROPHYLAXIS: Bactrim completed 7/7/2022  CMV PROPHYLAXIS: valcyte completed 7/7/2022;   FUNGAL PROPHYLAXISL nystatin completed  Aspirin: YES/NO(WHY) DOSE: 81 mg   Apixaban for AFIB    Hx cholangiocarcinoma: saw oncology: Given his relatively poor performance status, anemia and the marginal survival benefit of adjuvant chemotherapy even in non-transplant patients, I do not think that the benefits of chemotherapy outweigh the risk for Mr. Purdy. He is in agreement with this plan.  We will instead proceed with surveillance.  Plan for imaging every 3 months.    Ct wo contrast 5/22: no obvious cholangio; repeat w contrast scheduled for 11/22  CA 19-9 4/4/22: <2.1    Review of  Systems   Constitutional: Negative for fatigue.   HENT: Negative.    Eyes: Negative.    Respiratory: Negative.    Cardiovascular: Negative.    Genitourinary: Negative.    Musculoskeletal: Negative.         Leg pain   Skin: Negative.    Neurological: Positive for weakness.   Psychiatric/Behavioral: Negative.        Objective:     Vitals:    08/22/22 1511   BP: (!) 142/71   Pulse: 70   Resp: 18   Temp: 98 °F (36.7 °C)       Physical Exam  Vitals reviewed.   Constitutional:       Appearance: He is well-developed.   HENT:      Head: Normocephalic and atraumatic.   Eyes:      General: No scleral icterus.     Conjunctiva/sclera: Conjunctivae normal.      Pupils: Pupils are equal, round, and reactive to light.   Neck:      Thyroid: No thyromegaly.   Cardiovascular:      Rate and Rhythm: Normal rate and regular rhythm.      Heart sounds: Normal heart sounds.   Pulmonary:      Effort: Pulmonary effort is normal.      Breath sounds: Normal breath sounds. No rales.   Abdominal:      General: Bowel sounds are normal. There is distension (+dullness to percussion).      Palpations: Abdomen is soft. There is no mass.      Tenderness: There is no abdominal tenderness.   Musculoskeletal:         General: Normal range of motion.      Cervical back: Normal range of motion and neck supple.   Skin:     General: Skin is warm and dry.      Findings: No rash.   Neurological:      Mental Status: He is alert and oriented to person, place, and time.       Lab Results   Component Value Date    BILITOT 0.6 08/22/2022    AST 24 08/22/2022    ALT 17 08/22/2022    ALKPHOS 90 08/22/2022    CREATININE 1.2 08/22/2022    ALBUMIN 3.7 08/22/2022     Lab Results   Component Value Date    WBC 6.37 08/22/2022    HGB 9.7 (L) 08/22/2022    HCT 34.5 (L) 08/22/2022    HCT 33 (L) 01/06/2022     08/22/2022     Lab Results   Component Value Date    TACROLIMUS 5.8 08/15/2022       Assessment/Plan:   The patient is a 66 year old male who is now 9 months post  liver transplant. He is overall improved.    His post transplant course has been complicated by a covid infection and signifcant anemia requiring 2 units of PRBC and ascites. W/u for ascites suggestive of cardiac ascites, but echo not significant for heart failure. Current recommendations:  1. S/p liver transplant and control of IS: good allograft function, prograf target 3-5 continue cellcept;   2. Immunoprophylaxis:  Aspirin: YES/NO(WHY) DOSE: 81 mg   *Apixaban for AFIB*  3. Cholangiocarcinoma: chemotx deferred; surveillance imaging and cea/ca19-9 every 3 months-next due 11/22  4. Ascites, continue lasix 40 mg velasco    Return 01/23 at one year anniversary post liver tx    Thais Maxwell MD           [facility] Patient Status  Functional Status: 70% - Cares for self: unable to carry on normal activity or active work  Physical Capacity: No Limitations    New diabetes onset between last follow-up to the current follow-up: No  Did patient have any acute rejection episodes during the follow-up period: No  Post transplant malignancy: No

## 2022-08-22 NOTE — PROGRESS NOTES
MEDICAL ONCOLOGY - ESTABLISHED PATIENT VISIT    Reason for visit: Intrahepatic cholangiocarcinoma    Best Contact Phone Number(s): [phone] (home)      Cancer/Stage/TNM:   Cancer Staging  No matching staging information was found for the patient.     Oncology History   HCC (hepatocellular carcinoma) (Resolved)   12/29/2020 Initial Diagnosis    HCC (hepatocellular carcinoma)          Interim History:   66 y.o. male with CASTILLO cirrhosis s/p OLT 1/2022, HTN, a-fib, T2DM and explant pathology of cholangiocarcinoma after his liver transplantation on 1/6/22.  He states he is feeling well physically.  Ascites has been less problematic recently - last para was > 1 month ago.  No pain.  Does have some weakness in his legs when he is walking long distances; no dyspnea on exertion.  His wife does explain that he has been more dysthymic but the patient declines any intervention or referrals.        ROS:   Review of Systems   Constitutional: Positive for malaise/fatigue. Negative for chills, fever and weight loss.   HENT: Negative for congestion, hearing loss and sore throat.    Eyes: Negative for blurred vision and pain.   Respiratory: Positive for shortness of breath (exertional). Negative for cough.    Cardiovascular: Negative for chest pain, palpitations and leg swelling.   Gastrointestinal: Positive for constipation (intermittent). Negative for abdominal pain, blood in stool, diarrhea, nausea and vomiting.   Genitourinary: Negative for dysuria, frequency and hematuria.   Musculoskeletal: Negative for back pain, falls and myalgias.   Skin: Negative for rash.   Neurological: Positive for sensory change (stable neuropathy). Negative for dizziness, weakness and headaches.   Endo/Heme/Allergies: Does not bruise/bleed easily.   Psychiatric/Behavioral: Negative for depression. The patient is not nervous/anxious and does not have insomnia.    All other systems reviewed and are negative.      Past Medical History:   Past Medical  History:   Diagnosis Date    Atrial fibrillation     Cholangiocarcinoma 3/16/2022    Cirrhosis 11/23/2020    Diabetes mellitus, type 2     Diabetic neuropathy 11/24/2020    Disorder of kidney and ureter     HTN (hypertension) 11/23/2020    Hyperlipidemia 11/23/2020    Kidney stones 11/23/2020    S/p lithotripsy 2020    Leukocytosis 1/15/2021    Liver mass 11/23/2020    CASTILLO (nonalcoholic steatohepatitis) 11/23/2020    Other ascites 3/16/2022    PAD (peripheral artery disease)     Portal hypertension 11/23/2020    Skin cancer 11/23/2020        Past Surgical History:   Past Surgical History:   Procedure Laterality Date    COLONOSCOPY  10/2020    ESOPHAGOGASTRODUODENOSCOPY  10/2020    ESOPHAGOGASTRODUODENOSCOPY N/A 7/1/2021    Procedure: EGD (ESOPHAGOGASTRODUODENOSCOPY);  Surgeon: Jovan David MD;  Location: [facility] (4TH FLR);  Service: Endoscopy;  Laterality: N/A;  HCC. Listed for liver transplant. EGD for variceal surveillance.  cardiac clearance and blood thinenr approval received, see telephone encounter 6/23/21-BB  fully vaccinated-BB  labs same day of procedure-BB    EXCISION OF HYDROCELE Right     hemorroid surgery      hemorroidectomy      KIDNEY STONE SURGERY      LEFT HEART CATHETERIZATION N/A 1/27/2021    Procedure: Left heart cath;  Surgeon: Kris Shelton MD;  Location: [facility] CATH LAB;  Service: Cardiology;  Laterality: N/A;    liver mass removal      LIVER TRANSPLANT N/A 1/6/2022    Procedure: TRANSPLANT, LIVER;  Surgeon: Lizet Garcia MD;  Location: [facility] OR 2ND FLR;  Service: Transplant;  Laterality: N/A;    RIGHT HEART CATHETERIZATION Right 5/7/2021    Procedure: INSERTION, CATHETER, RIGHT HEART;  Surgeon: Jaden Schuster MD;  Location: [facility] CATH LAB;  Service: Cardiology;  Laterality: Right;    TREATMENT OF CARDIAC ARRHYTHMIA N/A 5/19/2021    Procedure: CARDIOVERSION;  Surgeon: Didier Tilley MD;  Location: [facility] EP LAB;  Service: Cardiology;  Laterality: N/A;   a fib, dccv/johny, mac, dm. sscu    TREATMENT OF CARDIAC ARRHYTHMIA N/A 2021    Procedure: CARDIOVERSION;  Surgeon: Daniel Arambula MD;  Location: [facility] EP LAB;  Service: Cardiology;  Laterality: N/A;  afib, DCCV, anest., DM, 3 prep    TREATMENT OF CARDIAC ARRHYTHMIA N/A 2021    Procedure: Cardioversion or Defibrillation;  Surgeon: Didier Tilley MD;  Location: [facility] EP LAB;  Service: Cardiology;  Laterality: N/A;  AF, JOHNY (cancel if complaint), DCCV, MAC, DM, 3 Prep    TREATMENT OF CARDIAC ARRHYTHMIA N/A 2021    Procedure: Cardioversion or Defibrillation;  Surgeon: Didier Tilley MD;  Location: [facility] EP LAB;  Service: Cardiology;  Laterality: N/A;  AF, JOHNY (cx if complaint), DCCV, MAC, DM, 3 Prep        Family History:   Family History   Problem Relation Age of Onset    Coronary artery disease Father         Social History:   Social History     Tobacco Use    Smoking status: Former Smoker     Quit date: 1980     Years since quittin.6    Smokeless tobacco: Former User   Substance Use Topics    Alcohol use: Not Currently        I have reviewed and updated the patient's past medical, surgical, family and social histories.    Allergies:   Review of patient's allergies indicates:   Allergen Reactions    Bee pollens Swelling     BEE STINGS swells body        Medications:   Current Outpatient Medications   Medication Sig Dispense Refill    amiodarone (PACERONE) 200 MG Tab Take 1 tablet (200 mg total) by mouth once daily. 30 tablet 11    apixaban (ELIQUIS) 5 mg Tab Take 1 tablet (5 mg total) by mouth 2 (two) times daily. 60 tablet 11    aspirin (ECOTRIN) 81 MG EC tablet Take 81 mg by mouth once daily.      blood sugar diagnostic Strp To check BG 2 times daily, to use with insurance preferred meter (patient has a [employer] self-monitoring glucose meter) 100 strip 11    blood-glucose meter kit To check BG 2 times daily, to use with insurance preferred meter 1 each 0    calcium carbonate  "(TUMS) 200 mg calcium (500 mg) chewable tablet Take 1 tablet (500 mg total) by mouth 3 (three) times daily as needed for Heartburn. 90 tablet 5    calcium carbonate-vitamin D3 600 mg-20 mcg (800 unit) Tab Take 1 tablet by mouth once daily. 30 tablet 5    furosemide (LASIX) 40 MG tablet Take 1 tablet (40 mg total) by mouth once daily. 30 tablet 11    gabapentin (NEURONTIN) 300 MG capsule Take 1 capsule (300 mg total) by mouth 3 (three) times daily as needed (neuropathy). 90 capsule 5    insulin (LANTUS SOLOSTAR U-100 INSULIN) glargine 100 units/mL (3mL) SubQ pen Inject 24 Units into the skin every evening. (Patient taking differently: Inject 20 Units into the skin every evening.) 9 mL 11    insulin lispro (HUMALOG KWIKPEN INSULIN) 100 unit/mL pen Inject 6 Units into the skin 3 (three) times daily. Plus sliding scale, MDD: 48 units (Patient taking differently: Inject 6 Units into the skin once daily. Plus sliding scale, MDD: 48 units) 15 mL 4    lancets Misc To check BG 2 times daily, to use with insurance preferred meter 100 each 11    lovastatin (MEVACOR) 20 MG tablet Take 20 mg by mouth once daily.      magnesium oxide (MAG-OX) 400 mg (241.3 mg magnesium) tablet Take 2 tablets (800 mg total) by mouth 3 (three) times daily. 180 tablet 6    multivitamin capsule Take 1 capsule by mouth once daily.      mycophenolate (CELLCEPT) 250 mg Cap Take 2 capsules (500 mg total) by mouth 2 (two) times daily. 120 capsule 11    omega-3 fatty acids/fish oil (FISH OIL-OMEGA-3 FATTY ACIDS) 300-1,000 mg capsule Take 1 capsule by mouth once daily.       ondansetron (ZOFRAN-ODT) 4 MG TbDL Take 1 tablet (4 mg total) by mouth every 8 (eight) hours as needed (nausea). (Patient not taking: No sig reported) 30 tablet 5    pantoprazole (PROTONIX) 40 MG tablet Take 1 tablet (40 mg total) by mouth once daily. 30 tablet 5    pen needle, diabetic (BD ULTRA-FINE GÉNESIS PEN NEEDLE) 32 gauge x 5/32" Ndle Use to inject insulin 4 times " daily 200 each 11    pulse oximeter (PULSE OXIMETER) device by Apply Externally route 2 (two) times a day. Use twice daily at 8 AM and 3 PM and record the value in [employer] as directed. 1 each 0    sertraline (ZOLOFT) 25 MG tablet Take 1 tablet (25 mg total) by mouth once daily. 30 tablet 5    tacrolimus (PROGRAF) 0.5 MG Cap Take 1 capsule (0.5 mg total) by mouth every 12 (twelve) hours. (Patient not taking: Reported on 6/30/2022) 60 capsule 11    zolpidem (AMBIEN) 5 MG Tab Take 1 tablet (5 mg total) by mouth nightly as needed (insomnia). 30 tablet 0     No current facility-administered medications for this visit.     Facility-Administered Medications Ordered in Other Visits   Medication Dose Route Frequency Provider Last Rate Last Admin    sodium chloride 0.9% bolus 1,000 mL  1,000 mL Intravenous Once Solange Bill, PARESH            Physical Exam:   BP (!) 156/74 (BP Location: Left arm, Patient Position: Sitting, BP Method: Large (Automatic))   Pulse 75   Temp 98.1 °F (36.7 °C) (Other (see comments))   Resp 18   Ht 6' (1.829 m)   Wt 96.7 kg (213 lb 3 oz)   SpO2 95%   BMI 28.91 kg/m²      ECOG Performance status: 2            Physical Exam  Vitals reviewed.   Constitutional:       General: He is not in acute distress.     Appearance: Normal appearance. He is obese. He is not ill-appearing, toxic-appearing or diaphoretic.   HENT:      Head: Normocephalic and atraumatic.      Right Ear: External ear normal.      Left Ear: External ear normal.      Nose: Nose normal.      Mouth/Throat:      Mouth: Mucous membranes are moist.      Pharynx: Oropharynx is clear. No posterior oropharyngeal erythema.   Eyes:      General: No scleral icterus.     Extraocular Movements: Extraocular movements intact.      Conjunctiva/sclera: Conjunctivae normal.      Pupils: Pupils are equal, round, and reactive to light.   Cardiovascular:      Rate and Rhythm: Normal rate and regular rhythm.      Pulses: Normal pulses.      Heart  sounds: Normal heart sounds.   Pulmonary:      Effort: Pulmonary effort is normal. No respiratory distress.      Breath sounds: Normal breath sounds. No wheezing or rales.   Chest:      Chest wall: No tenderness.   Abdominal:      General: Bowel sounds are normal. There is no distension.      Palpations: Abdomen is soft.      Tenderness: There is no abdominal tenderness. There is no right CVA tenderness or left CVA tenderness.      Comments: Horizontal abdominal surgical wound well-healing   Musculoskeletal:         General: No swelling. Normal range of motion.      Cervical back: Normal range of motion and neck supple.   Skin:     General: Skin is warm.      Coloration: Skin is not jaundiced.      Findings: No erythema or rash.   Neurological:      General: No focal deficit present.      Mental Status: He is alert and oriented to person, place, and time. Mental status is at baseline.      Cranial Nerves: No cranial nerve deficit.      Motor: No weakness.      Gait: Gait normal.   Psychiatric:         Mood and Affect: Mood normal.         Behavior: Behavior normal.         Thought Content: Thought content normal.         Judgment: Judgment normal.           Labs:   Recent Results (from the past 48 hour(s))   CBC Oncology    Collection Time: 08/22/22 10:56 AM   Result Value Ref Range    WBC 6.37 3.90 - 12.70 K/uL    RBC 4.62 4.60 - 6.20 M/uL    Hemoglobin 9.7 (L) 14.0 - 18.0 g/dL    Hematocrit 34.5 (L) 40.0 - 54.0 %    MCV 75 (L) 82 - 98 fL    MCH 21.0 (L) 27.0 - 31.0 pg    MCHC 28.1 (L) 32.0 - 36.0 g/dL    RDW 17.0 (H) 11.5 - 14.5 %    Platelets 189 150 - 450 K/uL    MPV 11.3 9.2 - 12.9 fL    Gran # (ANC) 4.3 1.8 - 7.7 K/uL    Immature Grans (Abs) 0.02 0.00 - 0.04 K/uL   Comprehensive Metabolic Panel    Collection Time: 08/22/22 10:56 AM   Result Value Ref Range    Sodium 140 136 - 145 mmol/L    Potassium 3.9 3.5 - 5.1 mmol/L    Chloride 101 95 - 110 mmol/L    CO2 30 (H) 23 - 29 mmol/L    Glucose 150 (H) 70 - 110  mg/dL    BUN 18 8 - 23 mg/dL    Creatinine 1.2 0.5 - 1.4 mg/dL    Calcium 9.2 8.7 - 10.5 mg/dL    Total Protein 7.0 6.0 - 8.4 g/dL    Albumin 3.7 3.5 - 5.2 g/dL    Total Bilirubin 0.6 0.1 - 1.0 mg/dL    Alkaline Phosphatase 90 55 - 135 U/L    AST 24 10 - 40 U/L    ALT 17 10 - 44 U/L    Anion Gap 9 8 - 16 mmol/L    eGFR >60.0 >60 mL/min/1.73 m^2        I have reviewed the pertinent labs from 5/13/22 which are notable for hgb 9.4, normal renal and hepatic function. WBC 1.71, no signs of infection.     Imaging:       I have personally reviewed the 5/13/22 CT A/P imaging which is notable for ascites, no clear evidence of metastatic disease.    Path:   Component 1 mo ago   Final Pathologic Diagnosis 1. Allograft liver, time-zero, needle biopsy:   - Mild large- and small-droplet macrovesicular steatosis (5-10%)   - No significant fibrosis   - See comment   2. Explant liver, total hepatectomy:   - Adenocarcinoma involving liver, consistent with cholangiocarcinoma   - Background liver with steatohepatitis and cirrhosis (stage 4 of 4);   etiology CASTILLO per clinical history   - Pathologic stage: [id number]   - See synoptic report   SYNOPTIC   Procedure: Total hepatectomy   Histologic Type: Cholangiocarcinoma NOS   Histologic Grade: G3, poorly differentiated   Tumor Focality: Solitary tumor   Tumor Size: 7.0 cm   Tumor Extent: Confined to hepatic parenchyma   Lymphovascular Invasion: Not identified   Margin Status for Invasive Carcinoma:     All margins negative for invasive carcinoma   Margin Status for High-Grade Intraepithelial Neoplasia:     All margins negative for high-grade intraepithelial neoplasia   Regional Lymph Nodes:      Number of Lymph Nodes with Tumor: 0      Number of Lymph Nodes Examined: 1   Pathologic Stage (AJCC 8th Edition):   TNM Descriptors: y (post-treatment)   pT Category: pT1b; Solitary tumor greater than 5 cm without vascular invasion   pN Category: pN0; No regional lymph node metastases   Additional  findings:   - Gallbladder with no diagnostic histopathologic alterations            Assessment:       1. 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 At risk for opportunistic infections    4. S/P liver transplant    5. Other ascites    6. Diabetic polyneuropathy associated with type 2 diabetes mellitus    7. PAD (peripheral artery disease)    8. Hyperlipidemia, unspecified hyperlipidemia type    9. Persistent atrial fibrillation    10. Class 1 obesity with serious comorbidity and body mass index (BMI) of 31.0 to 31.9 in adult, unspecified obesity type    11. Anemia in neoplastic disease    12. SOB (shortness of breath)          Plan:     # Cholangiocarcinoma  His explant pathology after OLT on 1/6/22 showed cholangiocarcinoma.  He had received TACE and RFA to the mass that was presumed HCC in 12/2020.  No progression of disease until his transplant.  I discussed with him that the pathology demonstrated cholangiocarcinoma instead of HCC, which does confer a worsened prognosis in the way of higher chance of recurrence. There is essentially no data on optimal management in this scenario in terms of adjuvant chemotherapy - only that the risk of recurrence is relatively high.  Adjuvant therapy would be an extrapolation from surgically resected cholangiocarcinoma in the way of the BILCAP trial with Xeloda x 6 months.      Given his relatively poor performance status at the time of initial consultation, anemia and the marginal survival benefit of adjuvant chemotherapy even in non-transplant patients, I did not think that the benefits of chemotherapy outweigh the risk for Mr. Purdy. He was in agreement with this plan.  We instead proceeded with surveillance.    Surveillance CT CAP on 8/19/22 reviewed and shows LUMA.  Repeat imaging in 3 months.  Plan for q3 month scans x 1 year and then q6 months if remains LUMA.    # Liver transplant  Continue close f/u with liver transplant team.  Immunosuppressive therapy  per transplant.    # Ascites  S/p multiple paracenteses.   Removed 5L first drainage, followed by 2-3L each time after.   Moderate ascites noted on scans but clinically stable.  Continue to f/u with hepatology.     # T2DM, PAD, HLD, a-fib, aortic atherosclerosis, obesity  Continue current medical management.  Weight stable in clinic.     # Anemia  Continues on Eliquis. H&H stable.   He will continue to get short interval labs with transplant team.     # SOB  Improving.  2/2 ascites and deconditioning.    Follow up: 3 months with imaging, labs.     Patient is in agreement with the proposed treatment plan. All questions were answered to the patient's satisfaction. Pt knows to call clinic if anything is needed before the next clinic visit.    John Ordoñez MD  Hematology/Oncology  [facility]    Route Chart for Scheduling    Med Onc Chart Routing      Follow up with physician 3 months.   Follow up with [facility]    Infusion scheduling note    Injection scheduling note    Labs CBC, CMP and CA 19-9   Lab interval:     Imaging CT chest abdomen pelvis      Pharmacy appointment    Other referrals            Therapy Plan Information  filgrastim-sndz (ZARXIO) injection 480 mcg/0.8 mL (Preferred Regimen)  480 mcg, Subcutaneous, PRN

## 2022-08-22 NOTE — LETTER
August 22, 2022        Jessika Carbone  [address]  [facility] [address]  Phone: [phone]  Fax: [phone]             [facility]  [address] [address]  Phone: [phone]  Fax: [phone]   Patient: Tej Purdy   MR Number: [id number]   YOB: 1955   Date of Visit: 8/22/2022       Dear Dr. Jessika Carbone    Thank you for referring Tej Purdy to me for evaluation. Attached you will find relevant portions of my assessment and plan of care.    If you have questions, please do not hesitate to call me. I look forward to following Tej Purdy along with you.    Sincerely,    Thais Maxwell MD    Enclosure    If you would like to receive this communication electronically, please contact [email or web address] or [phone] to request [employer] access.    [employer] is a tool which provides read-only access to select patient information with whom you have a relationship. Its easy to use and provides real time access to review your patients record including encounter summaries, notes, results, and demographic information.    If you feel you have received this communication in error or would no longer like to receive these types of communications, please e-mail [email or web address]

## 2022-08-29 ENCOUNTER — LAB VISIT (OUTPATIENT)
Dept: LAB | Facility: HOSPITAL | Age: 67
End: 2022-08-29
Attending: INTERNAL MEDICINE
Payer: MEDICARE

## 2022-08-29 DIAGNOSIS — Z94.4 S/P LIVER TRANSPLANT: ICD-10-CM

## 2022-08-29 LAB
ALBUMIN SERPL BCP-MCNC: 3.7 G/DL (ref 3.5–5.2)
ALP SERPL-CCNC: 91 U/L (ref 55–135)
ALT SERPL W/O P-5'-P-CCNC: 21 U/L (ref 10–44)
ANION GAP SERPL CALC-SCNC: 10 MMOL/L (ref 8–16)
AST SERPL-CCNC: 28 U/L (ref 10–40)
BASOPHILS # BLD AUTO: 0.06 K/UL (ref 0–0.2)
BASOPHILS NFR BLD: 1 % (ref 0–1.9)
BILIRUB SERPL-MCNC: 0.5 MG/DL (ref 0.1–1)
BUN SERPL-MCNC: 19 MG/DL (ref 8–23)
CALCIUM SERPL-MCNC: 9.2 MG/DL (ref 8.7–10.5)
CHLORIDE SERPL-SCNC: 102 MMOL/L (ref 95–110)
CO2 SERPL-SCNC: 31 MMOL/L (ref 23–29)
CREAT SERPL-MCNC: 1.1 MG/DL (ref 0.5–1.4)
DIFFERENTIAL METHOD: ABNORMAL
EOSINOPHIL # BLD AUTO: 0.1 K/UL (ref 0–0.5)
EOSINOPHIL NFR BLD: 2 % (ref 0–8)
ERYTHROCYTE [DISTWIDTH] IN BLOOD BY AUTOMATED COUNT: 17.1 % (ref 11.5–14.5)
EST. GFR  (NO RACE VARIABLE): >60 ML/MIN/1.73 M^2
GLUCOSE SERPL-MCNC: 138 MG/DL (ref 70–110)
HCT VFR BLD AUTO: 35.4 % (ref 40–54)
HGB BLD-MCNC: 9.7 G/DL (ref 14–18)
IMM GRANULOCYTES # BLD AUTO: 0.03 K/UL (ref 0–0.04)
IMM GRANULOCYTES NFR BLD AUTO: 0.5 % (ref 0–0.5)
LYMPHOCYTES # BLD AUTO: 1 K/UL (ref 1–4.8)
LYMPHOCYTES NFR BLD: 16.5 % (ref 18–48)
MCH RBC QN AUTO: 20.6 PG (ref 27–31)
MCHC RBC AUTO-ENTMCNC: 27.4 G/DL (ref 32–36)
MCV RBC AUTO: 75 FL (ref 82–98)
MONOCYTES # BLD AUTO: 0.8 K/UL (ref 0.3–1)
MONOCYTES NFR BLD: 12.4 % (ref 4–15)
NEUTROPHILS # BLD AUTO: 4.1 K/UL (ref 1.8–7.7)
NEUTROPHILS NFR BLD: 67.6 % (ref 38–73)
NRBC BLD-RTO: 0 /100 WBC
PLATELET # BLD AUTO: 183 K/UL (ref 150–450)
PMV BLD AUTO: 11.1 FL (ref 9.2–12.9)
POTASSIUM SERPL-SCNC: 4.2 MMOL/L (ref 3.5–5.1)
PROT SERPL-MCNC: 6.9 G/DL (ref 6–8.4)
RBC # BLD AUTO: 4.7 M/UL (ref 4.6–6.2)
SODIUM SERPL-SCNC: 143 MMOL/L (ref 136–145)
WBC # BLD AUTO: 6.12 K/UL (ref 3.9–12.7)

## 2022-08-29 PROCEDURE — 85025 COMPLETE CBC W/AUTO DIFF WBC: CPT | Performed by: INTERNAL MEDICINE

## 2022-08-29 PROCEDURE — 80197 ASSAY OF TACROLIMUS: CPT | Performed by: INTERNAL MEDICINE

## 2022-08-29 PROCEDURE — 36415 COLL VENOUS BLD VENIPUNCTURE: CPT | Mod: PN | Performed by: INTERNAL MEDICINE

## 2022-08-29 PROCEDURE — 80053 COMPREHEN METABOLIC PANEL: CPT | Performed by: INTERNAL MEDICINE

## 2022-08-30 LAB — TACROLIMUS BLD-MCNC: 7.4 NG/ML (ref 5–15)

## 2022-09-01 ENCOUNTER — PATIENT MESSAGE (OUTPATIENT)
Dept: TRANSPLANT | Facility: CLINIC | Age: 67
End: 2022-09-01
Payer: MEDICARE

## 2022-09-01 DIAGNOSIS — Z94.4 S/P LIVER TRANSPLANT: ICD-10-CM

## 2022-09-01 RX ORDER — TACROLIMUS 0.5 MG/1
0.5 CAPSULE ORAL DAILY
Qty: 30 CAPSULE | Refills: 11 | Status: SHIPPED | OUTPATIENT
Start: 2022-09-01 | End: 2022-09-14

## 2022-09-01 NOTE — TELEPHONE ENCOUNTER
Sent message to patient with changes.    ----- Message from Thais Maxwell MD sent at 8/31/2022 10:04 PM CDT -----  The Labs are stable; prograf level high- lower to 0.5 mg daily from 0.5/0.5 and repeat labs in  2 weeks- please let patient know.

## 2022-09-12 ENCOUNTER — LAB VISIT (OUTPATIENT)
Dept: LAB | Facility: HOSPITAL | Age: 67
End: 2022-09-12
Attending: INTERNAL MEDICINE
Payer: MEDICARE

## 2022-09-12 DIAGNOSIS — E83.42 HYPOMAGNESEMIA: ICD-10-CM

## 2022-09-12 DIAGNOSIS — Z94.4 S/P LIVER TRANSPLANT: ICD-10-CM

## 2022-09-12 LAB
ALBUMIN SERPL BCP-MCNC: 4.1 G/DL (ref 3.5–5.2)
ALP SERPL-CCNC: 76 U/L (ref 55–135)
ALT SERPL W/O P-5'-P-CCNC: 23 U/L (ref 10–44)
ANION GAP SERPL CALC-SCNC: 8 MMOL/L (ref 8–16)
AST SERPL-CCNC: 28 U/L (ref 10–40)
BASOPHILS # BLD AUTO: 0.05 K/UL (ref 0–0.2)
BASOPHILS NFR BLD: 0.8 % (ref 0–1.9)
BILIRUB SERPL-MCNC: 0.7 MG/DL (ref 0.1–1)
BUN SERPL-MCNC: 18 MG/DL (ref 8–23)
CALCIUM SERPL-MCNC: 9.5 MG/DL (ref 8.7–10.5)
CHLORIDE SERPL-SCNC: 102 MMOL/L (ref 95–110)
CO2 SERPL-SCNC: 28 MMOL/L (ref 23–29)
CREAT SERPL-MCNC: 1.1 MG/DL (ref 0.5–1.4)
DIFFERENTIAL METHOD: ABNORMAL
EOSINOPHIL # BLD AUTO: 0.2 K/UL (ref 0–0.5)
EOSINOPHIL NFR BLD: 2.9 % (ref 0–8)
ERYTHROCYTE [DISTWIDTH] IN BLOOD BY AUTOMATED COUNT: 17.6 % (ref 11.5–14.5)
EST. GFR  (NO RACE VARIABLE): >60 ML/MIN/1.73 M^2
GLUCOSE SERPL-MCNC: 130 MG/DL (ref 70–110)
HCT VFR BLD AUTO: 35.1 % (ref 40–54)
HGB BLD-MCNC: 10.2 G/DL (ref 14–18)
IMM GRANULOCYTES # BLD AUTO: 0.02 K/UL (ref 0–0.04)
IMM GRANULOCYTES NFR BLD AUTO: 0.3 % (ref 0–0.5)
LYMPHOCYTES # BLD AUTO: 1 K/UL (ref 1–4.8)
LYMPHOCYTES NFR BLD: 17.3 % (ref 18–48)
MAGNESIUM SERPL-MCNC: 1.6 MG/DL (ref 1.6–2.6)
MCH RBC QN AUTO: 21.1 PG (ref 27–31)
MCHC RBC AUTO-ENTMCNC: 29.1 G/DL (ref 32–36)
MCV RBC AUTO: 73 FL (ref 82–98)
MONOCYTES # BLD AUTO: 0.8 K/UL (ref 0.3–1)
MONOCYTES NFR BLD: 14.1 % (ref 4–15)
NEUTROPHILS # BLD AUTO: 3.8 K/UL (ref 1.8–7.7)
NEUTROPHILS NFR BLD: 64.6 % (ref 38–73)
NRBC BLD-RTO: 0 /100 WBC
PLATELET # BLD AUTO: 168 K/UL (ref 150–450)
PMV BLD AUTO: 11.2 FL (ref 9.2–12.9)
POTASSIUM SERPL-SCNC: 3.6 MMOL/L (ref 3.5–5.1)
PROT SERPL-MCNC: 7.5 G/DL (ref 6–8.4)
RBC # BLD AUTO: 4.84 M/UL (ref 4.6–6.2)
SODIUM SERPL-SCNC: 138 MMOL/L (ref 136–145)
WBC # BLD AUTO: 5.89 K/UL (ref 3.9–12.7)

## 2022-09-12 PROCEDURE — 36415 COLL VENOUS BLD VENIPUNCTURE: CPT | Mod: PN | Performed by: INTERNAL MEDICINE

## 2022-09-12 PROCEDURE — 85025 COMPLETE CBC W/AUTO DIFF WBC: CPT | Performed by: INTERNAL MEDICINE

## 2022-09-12 PROCEDURE — 83735 ASSAY OF MAGNESIUM: CPT | Performed by: INTERNAL MEDICINE

## 2022-09-12 PROCEDURE — 80053 COMPREHEN METABOLIC PANEL: CPT | Performed by: INTERNAL MEDICINE

## 2022-09-12 PROCEDURE — 80197 ASSAY OF TACROLIMUS: CPT | Performed by: INTERNAL MEDICINE

## 2022-09-13 LAB — TACROLIMUS BLD-MCNC: 2.2 NG/ML (ref 5–15)

## 2022-09-14 ENCOUNTER — PATIENT MESSAGE (OUTPATIENT)
Dept: TRANSPLANT | Facility: CLINIC | Age: 67
End: 2022-09-14
Payer: MEDICARE

## 2022-09-14 DIAGNOSIS — Z94.4 S/P LIVER TRANSPLANT: ICD-10-CM

## 2022-09-14 RX ORDER — TACROLIMUS 0.5 MG/1
0.5 CAPSULE ORAL EVERY 12 HOURS
Qty: 60 CAPSULE | Refills: 11 | Status: SHIPPED | OUTPATIENT
Start: 2022-09-14 | End: 2023-02-01

## 2022-09-14 NOTE — TELEPHONE ENCOUNTER
Sent message to patient with changes and next labs on 9/26/22    ----- Message from Thais Maxwell MD sent at 9/13/2022  8:27 PM CDT -----  The Labs are stable; increase prograf to 0.5 mg bid and repeat labs in 2 weeks - please let patient know.

## 2022-09-19 ENCOUNTER — LAB VISIT (OUTPATIENT)
Dept: LAB | Facility: HOSPITAL | Age: 67
End: 2022-09-19
Attending: INTERNAL MEDICINE
Payer: MEDICARE

## 2022-09-19 DIAGNOSIS — E78.2 MIXED HYPERLIPIDEMIA: ICD-10-CM

## 2022-09-19 DIAGNOSIS — Z79.4 TYPE 2 DIABETES MELLITUS WITH HYPERGLYCEMIA, WITH LONG-TERM CURRENT USE OF INSULIN: ICD-10-CM

## 2022-09-19 DIAGNOSIS — E11.65 TYPE 2 DIABETES MELLITUS WITH HYPERGLYCEMIA, WITH LONG-TERM CURRENT USE OF INSULIN: ICD-10-CM

## 2022-09-19 LAB
ANION GAP SERPL CALC-SCNC: 11 MMOL/L (ref 8–16)
BUN SERPL-MCNC: 16 MG/DL (ref 8–23)
CALCIUM SERPL-MCNC: 9.7 MG/DL (ref 8.7–10.5)
CHLORIDE SERPL-SCNC: 101 MMOL/L (ref 95–110)
CHOLEST SERPL-MCNC: 130 MG/DL (ref 120–199)
CHOLEST/HDLC SERPL: 3.6 {RATIO} (ref 2–5)
CO2 SERPL-SCNC: 27 MMOL/L (ref 23–29)
CREAT SERPL-MCNC: 0.9 MG/DL (ref 0.5–1.4)
EST. GFR  (NO RACE VARIABLE): >60 ML/MIN/1.73 M^2
ESTIMATED AVG GLUCOSE: 154 MG/DL (ref 68–131)
GLUCOSE SERPL-MCNC: 149 MG/DL (ref 70–110)
HBA1C MFR BLD: 7 % (ref 4–5.6)
HDLC SERPL-MCNC: 36 MG/DL (ref 40–75)
HDLC SERPL: 27.7 % (ref 20–50)
LDLC SERPL CALC-MCNC: 65.2 MG/DL (ref 63–159)
NONHDLC SERPL-MCNC: 94 MG/DL
POTASSIUM SERPL-SCNC: 4 MMOL/L (ref 3.5–5.1)
SODIUM SERPL-SCNC: 139 MMOL/L (ref 136–145)
TRIGL SERPL-MCNC: 144 MG/DL (ref 30–150)

## 2022-09-19 PROCEDURE — 80048 BASIC METABOLIC PNL TOTAL CA: CPT | Performed by: INTERNAL MEDICINE

## 2022-09-19 PROCEDURE — 83036 HEMOGLOBIN GLYCOSYLATED A1C: CPT | Performed by: INTERNAL MEDICINE

## 2022-09-19 PROCEDURE — 80061 LIPID PANEL: CPT | Performed by: INTERNAL MEDICINE

## 2022-09-19 PROCEDURE — 36415 COLL VENOUS BLD VENIPUNCTURE: CPT | Mod: PN | Performed by: INTERNAL MEDICINE

## 2022-09-26 ENCOUNTER — PATIENT MESSAGE (OUTPATIENT)
Dept: TRANSPLANT | Facility: CLINIC | Age: 67
End: 2022-09-26
Payer: MEDICARE

## 2022-09-26 ENCOUNTER — LAB VISIT (OUTPATIENT)
Dept: LAB | Facility: HOSPITAL | Age: 67
End: 2022-09-26
Attending: INTERNAL MEDICINE
Payer: MEDICARE

## 2022-09-26 DIAGNOSIS — Z94.4 S/P LIVER TRANSPLANT: ICD-10-CM

## 2022-09-26 DIAGNOSIS — E83.42 HYPOMAGNESEMIA: ICD-10-CM

## 2022-09-26 LAB
ALBUMIN SERPL BCP-MCNC: 3.9 G/DL (ref 3.5–5.2)
ALP SERPL-CCNC: 86 U/L (ref 55–135)
ALT SERPL W/O P-5'-P-CCNC: 20 U/L (ref 10–44)
ANION GAP SERPL CALC-SCNC: 8 MMOL/L (ref 8–16)
AST SERPL-CCNC: 26 U/L (ref 10–40)
BASOPHILS # BLD AUTO: 0.07 K/UL (ref 0–0.2)
BASOPHILS NFR BLD: 1.3 % (ref 0–1.9)
BILIRUB SERPL-MCNC: 0.5 MG/DL (ref 0.1–1)
BUN SERPL-MCNC: 19 MG/DL (ref 8–23)
CALCIUM SERPL-MCNC: 9.3 MG/DL (ref 8.7–10.5)
CHLORIDE SERPL-SCNC: 103 MMOL/L (ref 95–110)
CO2 SERPL-SCNC: 29 MMOL/L (ref 23–29)
CREAT SERPL-MCNC: 1.2 MG/DL (ref 0.5–1.4)
DIFFERENTIAL METHOD: ABNORMAL
EOSINOPHIL # BLD AUTO: 0.1 K/UL (ref 0–0.5)
EOSINOPHIL NFR BLD: 2.4 % (ref 0–8)
ERYTHROCYTE [DISTWIDTH] IN BLOOD BY AUTOMATED COUNT: 17.2 % (ref 11.5–14.5)
EST. GFR  (NO RACE VARIABLE): >60 ML/MIN/1.73 M^2
GLUCOSE SERPL-MCNC: 160 MG/DL (ref 70–110)
HCT VFR BLD AUTO: 35.1 % (ref 40–54)
HGB BLD-MCNC: 9.7 G/DL (ref 14–18)
IMM GRANULOCYTES # BLD AUTO: 0.01 K/UL (ref 0–0.04)
IMM GRANULOCYTES NFR BLD AUTO: 0.2 % (ref 0–0.5)
LYMPHOCYTES # BLD AUTO: 1.2 K/UL (ref 1–4.8)
LYMPHOCYTES NFR BLD: 22.7 % (ref 18–48)
MAGNESIUM SERPL-MCNC: 1.7 MG/DL (ref 1.6–2.6)
MCH RBC QN AUTO: 20.2 PG (ref 27–31)
MCHC RBC AUTO-ENTMCNC: 27.6 G/DL (ref 32–36)
MCV RBC AUTO: 73 FL (ref 82–98)
MONOCYTES # BLD AUTO: 0.7 K/UL (ref 0.3–1)
MONOCYTES NFR BLD: 12.6 % (ref 4–15)
NEUTROPHILS # BLD AUTO: 3.3 K/UL (ref 1.8–7.7)
NEUTROPHILS NFR BLD: 60.8 % (ref 38–73)
NRBC BLD-RTO: 0 /100 WBC
PLATELET # BLD AUTO: 172 K/UL (ref 150–450)
PMV BLD AUTO: 10.4 FL (ref 9.2–12.9)
POTASSIUM SERPL-SCNC: 3.9 MMOL/L (ref 3.5–5.1)
PROT SERPL-MCNC: 7 G/DL (ref 6–8.4)
RBC # BLD AUTO: 4.81 M/UL (ref 4.6–6.2)
SODIUM SERPL-SCNC: 140 MMOL/L (ref 136–145)
WBC # BLD AUTO: 5.47 K/UL (ref 3.9–12.7)

## 2022-09-26 PROCEDURE — 80053 COMPREHEN METABOLIC PANEL: CPT | Performed by: INTERNAL MEDICINE

## 2022-09-26 PROCEDURE — 36415 COLL VENOUS BLD VENIPUNCTURE: CPT | Mod: PN | Performed by: INTERNAL MEDICINE

## 2022-09-26 PROCEDURE — 83735 ASSAY OF MAGNESIUM: CPT | Performed by: INTERNAL MEDICINE

## 2022-09-26 PROCEDURE — 85025 COMPLETE CBC W/AUTO DIFF WBC: CPT | Performed by: INTERNAL MEDICINE

## 2022-09-26 PROCEDURE — 80197 ASSAY OF TACROLIMUS: CPT | Performed by: INTERNAL MEDICINE

## 2022-09-27 LAB — TACROLIMUS BLD-MCNC: 4.3 NG/ML (ref 5–15)

## 2022-09-29 ENCOUNTER — TELEPHONE (OUTPATIENT)
Dept: TRANSPLANT | Facility: CLINIC | Age: 67
End: 2022-09-29
Payer: MEDICARE

## 2022-09-29 ENCOUNTER — PATIENT MESSAGE (OUTPATIENT)
Dept: TRANSPLANT | Facility: CLINIC | Age: 67
End: 2022-09-29
Payer: MEDICARE

## 2022-09-29 DIAGNOSIS — Z94.4 S/P LIVER TRANSPLANT: Primary | ICD-10-CM

## 2022-09-29 DIAGNOSIS — E83.42 HYPOMAGNESEMIA: ICD-10-CM

## 2022-09-29 NOTE — TELEPHONE ENCOUNTER
Sent patient message via portal to let him know labs are stable.  No medication changes, next labs due on 10/17/22      ----- Message from Thais Maxwell MD sent at 9/29/2022  8:35 AM CDT -----  The Labs are stable - please let patient know.

## 2022-10-17 ENCOUNTER — LAB VISIT (OUTPATIENT)
Dept: LAB | Facility: HOSPITAL | Age: 67
End: 2022-10-17
Attending: INTERNAL MEDICINE
Payer: MEDICARE

## 2022-10-17 DIAGNOSIS — Z94.4 S/P LIVER TRANSPLANT: ICD-10-CM

## 2022-10-17 DIAGNOSIS — E83.42 HYPOMAGNESEMIA: ICD-10-CM

## 2022-10-17 LAB
ALBUMIN SERPL BCP-MCNC: 3.9 G/DL (ref 3.5–5.2)
ALP SERPL-CCNC: 65 U/L (ref 55–135)
ALT SERPL W/O P-5'-P-CCNC: 24 U/L (ref 10–44)
ANION GAP SERPL CALC-SCNC: 12 MMOL/L (ref 8–16)
AST SERPL-CCNC: 28 U/L (ref 10–40)
BASOPHILS # BLD AUTO: 0.07 K/UL (ref 0–0.2)
BASOPHILS NFR BLD: 1.2 % (ref 0–1.9)
BILIRUB SERPL-MCNC: 0.5 MG/DL (ref 0.1–1)
BUN SERPL-MCNC: 20 MG/DL (ref 8–23)
CALCIUM SERPL-MCNC: 9.3 MG/DL (ref 8.7–10.5)
CHLORIDE SERPL-SCNC: 102 MMOL/L (ref 95–110)
CO2 SERPL-SCNC: 26 MMOL/L (ref 23–29)
CREAT SERPL-MCNC: 1.1 MG/DL (ref 0.5–1.4)
DIFFERENTIAL METHOD: ABNORMAL
EOSINOPHIL # BLD AUTO: 0.2 K/UL (ref 0–0.5)
EOSINOPHIL NFR BLD: 2.9 % (ref 0–8)
ERYTHROCYTE [DISTWIDTH] IN BLOOD BY AUTOMATED COUNT: 17.7 % (ref 11.5–14.5)
EST. GFR  (NO RACE VARIABLE): >60 ML/MIN/1.73 M^2
GLUCOSE SERPL-MCNC: 133 MG/DL (ref 70–110)
HCT VFR BLD AUTO: 35.6 % (ref 40–54)
HGB BLD-MCNC: 9.8 G/DL (ref 14–18)
IMM GRANULOCYTES # BLD AUTO: 0.02 K/UL (ref 0–0.04)
IMM GRANULOCYTES NFR BLD AUTO: 0.3 % (ref 0–0.5)
LYMPHOCYTES # BLD AUTO: 1.1 K/UL (ref 1–4.8)
LYMPHOCYTES NFR BLD: 18.4 % (ref 18–48)
MAGNESIUM SERPL-MCNC: 1.7 MG/DL (ref 1.6–2.6)
MCH RBC QN AUTO: 20.3 PG (ref 27–31)
MCHC RBC AUTO-ENTMCNC: 27.5 G/DL (ref 32–36)
MCV RBC AUTO: 74 FL (ref 82–98)
MONOCYTES # BLD AUTO: 0.7 K/UL (ref 0.3–1)
MONOCYTES NFR BLD: 11.1 % (ref 4–15)
NEUTROPHILS # BLD AUTO: 3.9 K/UL (ref 1.8–7.7)
NEUTROPHILS NFR BLD: 66.1 % (ref 38–73)
NRBC BLD-RTO: 0 /100 WBC
PLATELET # BLD AUTO: 157 K/UL (ref 150–450)
PMV BLD AUTO: 11 FL (ref 9.2–12.9)
POTASSIUM SERPL-SCNC: 3.9 MMOL/L (ref 3.5–5.1)
PROT SERPL-MCNC: 7.1 G/DL (ref 6–8.4)
RBC # BLD AUTO: 4.82 M/UL (ref 4.6–6.2)
SODIUM SERPL-SCNC: 140 MMOL/L (ref 136–145)
WBC # BLD AUTO: 5.93 K/UL (ref 3.9–12.7)

## 2022-10-17 PROCEDURE — 85025 COMPLETE CBC W/AUTO DIFF WBC: CPT | Performed by: INTERNAL MEDICINE

## 2022-10-17 PROCEDURE — 80197 ASSAY OF TACROLIMUS: CPT | Performed by: INTERNAL MEDICINE

## 2022-10-17 PROCEDURE — 36415 COLL VENOUS BLD VENIPUNCTURE: CPT | Mod: PN | Performed by: INTERNAL MEDICINE

## 2022-10-17 PROCEDURE — 80053 COMPREHEN METABOLIC PANEL: CPT | Performed by: INTERNAL MEDICINE

## 2022-10-17 PROCEDURE — 83735 ASSAY OF MAGNESIUM: CPT | Performed by: INTERNAL MEDICINE

## 2022-10-18 ENCOUNTER — TELEPHONE (OUTPATIENT)
Dept: TRANSPLANT | Facility: CLINIC | Age: 67
End: 2022-10-18
Payer: MEDICARE

## 2022-10-18 ENCOUNTER — PATIENT MESSAGE (OUTPATIENT)
Dept: TRANSPLANT | Facility: CLINIC | Age: 67
End: 2022-10-18
Payer: MEDICARE

## 2022-10-18 LAB — TACROLIMUS BLD-MCNC: 3.4 NG/ML (ref 5–15)

## 2022-10-18 NOTE — TELEPHONE ENCOUNTER
Sent patient message via portal to let him know labs are stable.  No medication changes, next labs due on 11/14/22      ----- Message from Thais Maxwell MD sent at 10/18/2022 12:27 PM CDT -----  The Labs are stable - please let patient know.

## 2022-11-11 ENCOUNTER — HOSPITAL ENCOUNTER (OUTPATIENT)
Dept: RADIOLOGY | Facility: HOSPITAL | Age: 67
Discharge: HOME | End: 2022-11-11
Attending: INTERNAL MEDICINE
Payer: MEDICARE

## 2022-11-11 DIAGNOSIS — C22.1 CHOLANGIOCARCINOMA: ICD-10-CM

## 2022-11-11 PROCEDURE — 25500020 PHARM REV CODE 255: Performed by: INTERNAL MEDICINE

## 2022-11-11 PROCEDURE — 74177 CT CHEST ABDOMEN PELVIS WITH CONTRAST (XPD): ICD-10-PCS | Mod: 26,,, | Performed by: STUDENT IN AN ORGANIZED HEALTH CARE EDUCATION/TRAINING PROGRAM

## 2022-11-11 PROCEDURE — 71260 CT THORAX DX C+: CPT | Mod: 26,,, | Performed by: STUDENT IN AN ORGANIZED HEALTH CARE EDUCATION/TRAINING PROGRAM

## 2022-11-11 PROCEDURE — 71260 CT CHEST ABDOMEN PELVIS WITH CONTRAST (XPD): ICD-10-PCS | Mod: 26,,, | Performed by: STUDENT IN AN ORGANIZED HEALTH CARE EDUCATION/TRAINING PROGRAM

## 2022-11-11 PROCEDURE — 74177 CT ABD & PELVIS W/CONTRAST: CPT | Mod: 26,,, | Performed by: STUDENT IN AN ORGANIZED HEALTH CARE EDUCATION/TRAINING PROGRAM

## 2022-11-11 PROCEDURE — 71260 CT THORAX DX C+: CPT | Mod: TC

## 2022-11-11 PROCEDURE — 74177 CT ABD & PELVIS W/CONTRAST: CPT | Mod: TC

## 2022-11-11 RX ADMIN — IOHEXOL 100 ML: 350 INJECTION, SOLUTION INTRAVENOUS at 11:11

## 2022-11-14 ENCOUNTER — LAB VISIT (OUTPATIENT)
Dept: LAB | Facility: HOSPITAL | Age: 67
End: 2022-11-14
Attending: INTERNAL MEDICINE
Payer: MEDICARE

## 2022-11-14 ENCOUNTER — OFFICE VISIT (OUTPATIENT)
Dept: HEMATOLOGY/ONCOLOGY | Facility: CLINIC | Age: 67
End: 2022-11-14
Payer: MEDICARE

## 2022-11-14 VITALS
HEIGHT: 72 IN | RESPIRATION RATE: 18 BRPM | OXYGEN SATURATION: 95 % | HEART RATE: 79 BPM | DIASTOLIC BLOOD PRESSURE: 70 MMHG | SYSTOLIC BLOOD PRESSURE: 148 MMHG | WEIGHT: 220.25 LBS | BODY MASS INDEX: 29.83 KG/M2 | TEMPERATURE: 98 F

## 2022-11-14 DIAGNOSIS — Z91.89 AT RISK FOR OPPORTUNISTIC INFECTIONS: ICD-10-CM

## 2022-11-14 DIAGNOSIS — E66.9 CLASS 1 OBESITY WITH SERIOUS COMORBIDITY AND BODY MASS INDEX (BMI) OF 31.0 TO 31.9 IN ADULT, UNSPECIFIED OBESITY TYPE: ICD-10-CM

## 2022-11-14 DIAGNOSIS — R18.8 OTHER ASCITES: ICD-10-CM

## 2022-11-14 DIAGNOSIS — D84.821 IMMUNODEFICIENCY DUE TO DRUGS: ICD-10-CM

## 2022-11-14 DIAGNOSIS — E78.5 HYPERLIPIDEMIA, UNSPECIFIED HYPERLIPIDEMIA TYPE: ICD-10-CM

## 2022-11-14 DIAGNOSIS — C22.1 CHOLANGIOCARCINOMA: ICD-10-CM

## 2022-11-14 DIAGNOSIS — C22.1 CHOLANGIOCARCINOMA: Primary | ICD-10-CM

## 2022-11-14 DIAGNOSIS — R06.02 SOB (SHORTNESS OF BREATH): ICD-10-CM

## 2022-11-14 DIAGNOSIS — E11.42 DIABETIC POLYNEUROPATHY ASSOCIATED WITH TYPE 2 DIABETES MELLITUS: ICD-10-CM

## 2022-11-14 DIAGNOSIS — Z79.899 IMMUNODEFICIENCY DUE TO DRUGS: ICD-10-CM

## 2022-11-14 DIAGNOSIS — Z94.4 S/P LIVER TRANSPLANT: ICD-10-CM

## 2022-11-14 DIAGNOSIS — I73.9 PAD (PERIPHERAL ARTERY DISEASE): ICD-10-CM

## 2022-11-14 DIAGNOSIS — I48.19 PERSISTENT ATRIAL FIBRILLATION: ICD-10-CM

## 2022-11-14 DIAGNOSIS — D63.0 ANEMIA IN NEOPLASTIC DISEASE: ICD-10-CM

## 2022-11-14 DIAGNOSIS — I70.0 AORTIC ATHEROSCLEROSIS: ICD-10-CM

## 2022-11-14 LAB
ALBUMIN SERPL BCP-MCNC: 3.9 G/DL (ref 3.5–5.2)
ALP SERPL-CCNC: 64 U/L (ref 55–135)
ALT SERPL W/O P-5'-P-CCNC: 29 U/L (ref 10–44)
ANION GAP SERPL CALC-SCNC: 10 MMOL/L (ref 8–16)
AST SERPL-CCNC: 33 U/L (ref 10–40)
BILIRUB SERPL-MCNC: 0.7 MG/DL (ref 0.1–1)
BUN SERPL-MCNC: 19 MG/DL (ref 8–23)
CALCIUM SERPL-MCNC: 9.3 MG/DL (ref 8.7–10.5)
CANCER AG19-9 SERPL-ACNC: <2.1 U/ML (ref 0–40)
CHLORIDE SERPL-SCNC: 99 MMOL/L (ref 95–110)
CO2 SERPL-SCNC: 28 MMOL/L (ref 23–29)
CREAT SERPL-MCNC: 1.2 MG/DL (ref 0.5–1.4)
ERYTHROCYTE [DISTWIDTH] IN BLOOD BY AUTOMATED COUNT: 18.3 % (ref 11.5–14.5)
EST. GFR  (NO RACE VARIABLE): >60 ML/MIN/1.73 M^2
GLUCOSE SERPL-MCNC: 245 MG/DL (ref 70–110)
HCT VFR BLD AUTO: 35.6 % (ref 40–54)
HGB BLD-MCNC: 10 G/DL (ref 14–18)
IMM GRANULOCYTES # BLD AUTO: 0.02 K/UL (ref 0–0.04)
MCH RBC QN AUTO: 20.2 PG (ref 27–31)
MCHC RBC AUTO-ENTMCNC: 28.1 G/DL (ref 32–36)
MCV RBC AUTO: 72 FL (ref 82–98)
NEUTROPHILS # BLD AUTO: 3.9 K/UL (ref 1.8–7.7)
PLATELET # BLD AUTO: 144 K/UL (ref 150–450)
PMV BLD AUTO: 11.3 FL (ref 9.2–12.9)
POTASSIUM SERPL-SCNC: 4 MMOL/L (ref 3.5–5.1)
PROT SERPL-MCNC: 7.3 G/DL (ref 6–8.4)
RBC # BLD AUTO: 4.96 M/UL (ref 4.6–6.2)
SODIUM SERPL-SCNC: 137 MMOL/L (ref 136–145)
WBC # BLD AUTO: 5.65 K/UL (ref 3.9–12.7)

## 2022-11-14 PROCEDURE — 99214 PR OFFICE/OUTPT VISIT, EST, LEVL IV, 30-39 MIN: ICD-10-PCS | Mod: S$GLB,,, | Performed by: INTERNAL MEDICINE

## 2022-11-14 PROCEDURE — 1159F MED LIST DOCD IN RCRD: CPT | Mod: CPTII,S$GLB,, | Performed by: INTERNAL MEDICINE

## 2022-11-14 PROCEDURE — 3008F PR BODY MASS INDEX (BMI) DOCUMENTED: ICD-10-PCS | Mod: CPTII,S$GLB,, | Performed by: INTERNAL MEDICINE

## 2022-11-14 PROCEDURE — 3078F DIAST BP <80 MM HG: CPT | Mod: CPTII,S$GLB,, | Performed by: INTERNAL MEDICINE

## 2022-11-14 PROCEDURE — 99999 PR PBB SHADOW E&M-EST. PATIENT-LVL V: CPT | Mod: PBBFAC,,, | Performed by: INTERNAL MEDICINE

## 2022-11-14 PROCEDURE — 3008F BODY MASS INDEX DOCD: CPT | Mod: CPTII,S$GLB,, | Performed by: INTERNAL MEDICINE

## 2022-11-14 PROCEDURE — 1101F PR PT FALLS ASSESS DOC 0-1 FALLS W/OUT INJ PAST YR: ICD-10-PCS | Mod: CPTII,S$GLB,, | Performed by: INTERNAL MEDICINE

## 2022-11-14 PROCEDURE — 99999 PR PBB SHADOW E&M-EST. PATIENT-LVL V: ICD-10-PCS | Mod: PBBFAC,,, | Performed by: INTERNAL MEDICINE

## 2022-11-14 PROCEDURE — 1160F PR REVIEW ALL MEDS BY PRESCRIBER/CLIN PHARMACIST DOCUMENTED: ICD-10-PCS | Mod: CPTII,S$GLB,, | Performed by: INTERNAL MEDICINE

## 2022-11-14 PROCEDURE — 3077F PR MOST RECENT SYSTOLIC BLOOD PRESSURE >= 140 MM HG: ICD-10-PCS | Mod: CPTII,S$GLB,, | Performed by: INTERNAL MEDICINE

## 2022-11-14 PROCEDURE — 1160F RVW MEDS BY RX/DR IN RCRD: CPT | Mod: CPTII,S$GLB,, | Performed by: INTERNAL MEDICINE

## 2022-11-14 PROCEDURE — 3077F SYST BP >= 140 MM HG: CPT | Mod: CPTII,S$GLB,, | Performed by: INTERNAL MEDICINE

## 2022-11-14 PROCEDURE — 3288F PR FALLS RISK ASSESSMENT DOCUMENTED: ICD-10-PCS | Mod: CPTII,S$GLB,, | Performed by: INTERNAL MEDICINE

## 2022-11-14 PROCEDURE — 3051F HG A1C>EQUAL 7.0%<8.0%: CPT | Mod: CPTII,S$GLB,, | Performed by: INTERNAL MEDICINE

## 2022-11-14 PROCEDURE — 1126F PR PAIN SEVERITY QUANTIFIED, NO PAIN PRESENT: ICD-10-PCS | Mod: CPTII,S$GLB,, | Performed by: INTERNAL MEDICINE

## 2022-11-14 PROCEDURE — 1126F AMNT PAIN NOTED NONE PRSNT: CPT | Mod: CPTII,S$GLB,, | Performed by: INTERNAL MEDICINE

## 2022-11-14 PROCEDURE — 36415 COLL VENOUS BLD VENIPUNCTURE: CPT | Performed by: INTERNAL MEDICINE

## 2022-11-14 PROCEDURE — 1159F PR MEDICATION LIST DOCUMENTED IN MEDICAL RECORD: ICD-10-PCS | Mod: CPTII,S$GLB,, | Performed by: INTERNAL MEDICINE

## 2022-11-14 PROCEDURE — 4010F PR ACE/ARB THEARPY RXD/TAKEN: ICD-10-PCS | Mod: CPTII,S$GLB,, | Performed by: INTERNAL MEDICINE

## 2022-11-14 PROCEDURE — 99214 OFFICE O/P EST MOD 30 MIN: CPT | Mod: S$GLB,,, | Performed by: INTERNAL MEDICINE

## 2022-11-14 PROCEDURE — 85027 COMPLETE CBC AUTOMATED: CPT | Mod: 59 | Performed by: INTERNAL MEDICINE

## 2022-11-14 PROCEDURE — 3078F PR MOST RECENT DIASTOLIC BLOOD PRESSURE < 80 MM HG: ICD-10-PCS | Mod: CPTII,S$GLB,, | Performed by: INTERNAL MEDICINE

## 2022-11-14 PROCEDURE — 1101F PT FALLS ASSESS-DOCD LE1/YR: CPT | Mod: CPTII,S$GLB,, | Performed by: INTERNAL MEDICINE

## 2022-11-14 PROCEDURE — 80053 COMPREHEN METABOLIC PANEL: CPT | Performed by: INTERNAL MEDICINE

## 2022-11-14 PROCEDURE — 3288F FALL RISK ASSESSMENT DOCD: CPT | Mod: CPTII,S$GLB,, | Performed by: INTERNAL MEDICINE

## 2022-11-14 PROCEDURE — 3051F PR MOST RECENT HEMOGLOBIN A1C LEVEL 7.0 - < 8.0%: ICD-10-PCS | Mod: CPTII,S$GLB,, | Performed by: INTERNAL MEDICINE

## 2022-11-14 PROCEDURE — 86301 IMMUNOASSAY TUMOR CA 19-9: CPT | Performed by: INTERNAL MEDICINE

## 2022-11-14 PROCEDURE — 4010F ACE/ARB THERAPY RXD/TAKEN: CPT | Mod: CPTII,S$GLB,, | Performed by: INTERNAL MEDICINE

## 2022-11-14 NOTE — PROGRESS NOTES
MEDICAL ONCOLOGY - ESTABLISHED PATIENT VISIT    Reason for visit: Intrahepatic cholangiocarcinoma    Best Contact Phone Number(s): [phone] (home)      Cancer/Stage/TNM:    Cancer Staging   No matching staging information was found for the patient.     Oncology History   HCC (hepatocellular carcinoma) (Resolved)   12/29/2020 Initial Diagnosis    HCC (hepatocellular carcinoma)          Interim History:   67 y.o. male with CASTILLO cirrhosis s/p OLT 1/2022, HTN, a-fib, T2DM and explant pathology of cholangiocarcinoma after his liver transplantation on 1/6/22. Continues with fatigue, though slightly improved since last visit. Notes his hips and legs are getting tired/painful after only a few steps, so physical activity has been limited. Otherwise, feeling well. Has not needed to have paracentesis done in several months and ascites minimal.     Presents to clinic with his spouse, Aniya. ECOG 1.     ROS:   Review of Systems   Constitutional:  Positive for malaise/fatigue. Negative for chills, fever and weight loss.   HENT:  Negative for congestion, hearing loss and sore throat.    Eyes:  Negative for blurred vision and pain.   Respiratory:  Positive for shortness of breath (exertional). Negative for cough.    Cardiovascular:  Negative for chest pain, palpitations and leg swelling.   Gastrointestinal:  Positive for constipation (intermittent). Negative for abdominal pain, blood in stool, diarrhea, nausea and vomiting.   Genitourinary:  Negative for dysuria, frequency and hematuria.   Musculoskeletal:  Positive for joint pain (hips, pain with physical activity). Negative for back pain, falls and myalgias.   Skin:  Negative for rash.   Neurological:  Positive for sensory change (stable neuropathy). Negative for dizziness, weakness and headaches.   Endo/Heme/Allergies:  Does not bruise/bleed easily.   Psychiatric/Behavioral:  Negative for depression. The patient is not nervous/anxious and does not have insomnia.    All other  systems reviewed and are negative.    Past Medical History:   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    Cirrhosis 11/23/2020    Diabetes mellitus, type 2     Diabetic neuropathy 11/24/2020    Disorder of kidney and ureter     HTN (hypertension) 11/23/2020    Hyperlipidemia 11/23/2020    Kidney stones 11/23/2020    S/p lithotripsy 2020    Leukocytosis 1/15/2021    Liver mass 11/23/2020    CASTILLO (nonalcoholic steatohepatitis) 11/23/2020    Other ascites 3/16/2022    PAD (peripheral artery disease)     Portal hypertension 11/23/2020    Skin cancer 11/23/2020        Past Surgical History:   Past Surgical History:   Procedure Laterality Date    COLONOSCOPY  10/2020    ESOPHAGOGASTRODUODENOSCOPY  10/2020    ESOPHAGOGASTRODUODENOSCOPY N/A 7/1/2021    Procedure: EGD (ESOPHAGOGASTRODUODENOSCOPY);  Surgeon: Jovan David MD;  Location: [facility] (4TH FLR);  Service: Endoscopy;  Laterality: N/A;  HCC. Listed for liver transplant. EGD for variceal surveillance.  cardiac clearance and blood thinenr approval received, see telephone encounter 6/23/21-BB  fully vaccinated-BB  labs same day of procedure-BB    EXCISION OF HYDROCELE Right     hemorroid surgery      hemorroidectomy      KIDNEY STONE SURGERY      LEFT HEART CATHETERIZATION N/A 1/27/2021    Procedure: Left heart cath;  Surgeon: Kris Shelton MD;  Location: [facility] CATH LAB;  Service: Cardiology;  Laterality: N/A;    liver mass removal      LIVER TRANSPLANT N/A 1/6/2022    Procedure: TRANSPLANT, LIVER;  Surgeon: Lizet Garcia MD;  Location: [facility] 2ND FLR;  Service: Transplant;  Laterality: N/A;    RIGHT HEART CATHETERIZATION Right 5/7/2021    Procedure: INSERTION, CATHETER, RIGHT HEART;  Surgeon: Jaden Schuster MD;  Location: [facility] CATH LAB;  Service: Cardiology;  Laterality: Right;    TREATMENT OF CARDIAC ARRHYTHMIA N/A 5/19/2021    Procedure: CARDIOVERSION;  Surgeon: Didier Tilley MD;  Location: [facility] EP  LAB;  Service: Cardiology;  Laterality: N/A;  a fib, dccv/johny, mac, dm. sscu    TREATMENT OF CARDIAC ARRHYTHMIA N/A 2021    Procedure: CARDIOVERSION;  Surgeon: Daniel Arambula MD;  Location: [facility] EP LAB;  Service: Cardiology;  Laterality: N/A;  afib, DCCV, anest., DM, 3 prep    TREATMENT OF CARDIAC ARRHYTHMIA N/A 2021    Procedure: Cardioversion or Defibrillation;  Surgeon: Didier Tilley MD;  Location: [facility] EP LAB;  Service: Cardiology;  Laterality: N/A;  AF, JOHNY (cancel if complaint), DCCV, MAC, DM, 3 Prep    TREATMENT OF CARDIAC ARRHYTHMIA N/A 2021    Procedure: Cardioversion or Defibrillation;  Surgeon: Didier Tilley MD;  Location: [facility] EP LAB;  Service: Cardiology;  Laterality: N/A;  AF, JOHNY (cx if complaint), DCCV, MAC, DM, 3 Prep        Family History:   Family History   Problem Relation Age of Onset    Coronary artery disease Father         Social History:   Social History     Tobacco Use    Smoking status: Former     Types: Cigarettes     Quit date: 1980     Years since quittin.8    Smokeless tobacco: Former   Substance Use Topics    Alcohol use: Not Currently        I have reviewed and updated the patient's past medical, surgical, family and social histories.    Allergies:   Review of patient's allergies indicates:   Allergen Reactions    Bee pollens Swelling     BEE STINGS swells body        Medications:   Current Outpatient Medications   Medication Sig Dispense Refill    amiodarone (PACERONE) 200 MG Tab Take 1 tablet (200 mg total) by mouth once daily. 30 tablet 11    apixaban (ELIQUIS) 5 mg Tab Take 1 tablet (5 mg total) by mouth 2 (two) times daily. 60 tablet 11    aspirin (ECOTRIN) 81 MG EC tablet Take 81 mg by mouth once daily.      blood sugar diagnostic Strp To check BG 2 times daily, to use with insurance preferred meter (patient has a [employer] self-monitoring glucose meter) 100 strip 11    blood-glucose meter kit To check BG 2 times daily, to use with insurance  "preferred meter 1 each 0    calcium carbonate (TUMS) 200 mg calcium (500 mg) chewable tablet Take 1 tablet (500 mg total) by mouth 3 (three) times daily as needed for Heartburn. 90 tablet 5    calcium carbonate-vitamin D3 600 mg-20 mcg (800 unit) Tab Take 1 tablet by mouth once daily. 30 tablet 5    furosemide (LASIX) 40 MG tablet Take 1 tablet (40 mg total) by mouth once daily. 30 tablet 11    gabapentin (NEURONTIN) 300 MG capsule TAKE 1 CAPSULE (300 MG TOTAL) BY MOUTH 3 (THREE) TIMES DAILY AS NEEDED (NEUROPATHY). 90 capsule 5    insulin (LANTUS SOLOSTAR U-100 INSULIN) glargine 100 units/mL (3mL) SubQ pen Inject 24 Units into the skin every evening. (Patient taking differently: Inject 20 Units into the skin every evening.) 9 mL 11    insulin lispro (HUMALOG KWIKPEN INSULIN) 100 unit/mL pen Inject 6 Units into the skin 3 (three) times daily. Plus sliding scale, MDD: 48 units (Patient taking differently: Inject 6 Units into the skin once daily. Plus sliding scale, MDD: 48 units) 15 mL 4    lancets Misc To check BG 2 times daily, to use with insurance preferred meter 100 each 11    lovastatin (MEVACOR) 20 MG tablet Take 20 mg by mouth once daily.      magnesium oxide (MAG-OX) 400 mg (241.3 mg magnesium) tablet Take 2 tablets (800 mg total) by mouth 3 (three) times daily. 180 tablet 6    multivitamin capsule Take 1 capsule by mouth once daily.      mycophenolate (CELLCEPT) 250 mg Cap Take 2 capsules (500 mg total) by mouth 2 (two) times daily. 120 capsule 11    omega-3 fatty acids/fish oil (FISH OIL-OMEGA-3 FATTY ACIDS) 300-1,000 mg capsule Take 1 capsule by mouth once daily.       ondansetron (ZOFRAN-ODT) 4 MG TbDL Take 1 tablet (4 mg total) by mouth every 8 (eight) hours as needed (nausea). 30 tablet 5    pantoprazole (PROTONIX) 40 MG tablet Take 1 tablet (40 mg total) by mouth once daily. 30 tablet 5    pen needle, diabetic (BD ULTRA-FINE GÉNESIS PEN NEEDLE) 32 gauge x 5/32" Ndle Use to inject insulin 4 times daily 200 " each 11    pulse oximeter (PULSE OXIMETER) device by Apply Externally route 2 (two) times a day. Use twice daily at 8 AM and 3 PM and record the value in MyChart as directed. (Patient not taking: Reported on 8/22/2022) 1 each 0    sertraline (ZOLOFT) 25 MG tablet Take 1 tablet (25 mg total) by mouth once daily. 30 tablet 5    tacrolimus (PROGRAF) 0.5 MG Cap Take 1 capsule (0.5 mg total) by mouth every 12 (twelve) hours. 60 capsule 11    zolpidem (AMBIEN) 5 MG Tab Take 1 tablet (5 mg total) by mouth nightly as needed (insomnia). 30 tablet 0     No current facility-administered medications for this visit.     Facility-Administered Medications Ordered in Other Visits   Medication Dose Route Frequency Provider Last Rate Last Admin    sodium chloride 0.9% bolus 1,000 mL  1,000 mL Intravenous Once Solange Bill, PARESH            Physical Exam:   BP (!) 148/70 (BP Location: Left arm, Patient Position: Sitting, BP Method: Medium (Automatic))   Pulse 79   Temp 98.2 °F (36.8 °C) (Oral)   Resp 18   Ht 6' (1.829 m)   Wt 99.9 kg (220 lb 3.8 oz)   SpO2 95%   BMI 29.87 kg/m²      ECOG Performance status: 1    Physical Exam  Vitals reviewed.   Constitutional:       General: He is not in acute distress.     Appearance: Normal appearance. He is obese. He is not ill-appearing, toxic-appearing or diaphoretic.   HENT:      Head: Normocephalic and atraumatic.      Right Ear: External ear normal.      Left Ear: External ear normal.      Nose: Nose normal.      Mouth/Throat:      Mouth: Mucous membranes are moist.      Pharynx: Oropharynx is clear. No posterior oropharyngeal erythema.   Eyes:      General: No scleral icterus.     Extraocular Movements: Extraocular movements intact.      Conjunctiva/sclera: Conjunctivae normal.      Pupils: Pupils are equal, round, and reactive to light.   Cardiovascular:      Rate and Rhythm: Normal rate and regular rhythm.      Pulses: Normal pulses.      Heart sounds: Normal heart sounds.    Pulmonary:      Effort: Pulmonary effort is normal. No respiratory distress.      Breath sounds: Normal breath sounds. No wheezing or rales.   Chest:      Chest wall: No tenderness.   Abdominal:      General: Bowel sounds are normal. There is no distension.      Palpations: Abdomen is soft.      Tenderness: There is no abdominal tenderness. There is no right CVA tenderness or left CVA tenderness.      Comments: Horizontal abdominal surgical wound well-healing   Musculoskeletal:         General: No swelling. Normal range of motion.      Cervical back: Normal range of motion and neck supple.   Skin:     General: Skin is warm.      Coloration: Skin is not jaundiced.      Findings: No erythema or rash.   Neurological:      General: No focal deficit present.      Mental Status: He is alert and oriented to person, place, and time. Mental status is at baseline.      Cranial Nerves: No cranial nerve deficit.      Motor: No weakness.      Gait: Gait normal.   Psychiatric:         Mood and Affect: Mood normal.         Behavior: Behavior normal.         Thought Content: Thought content normal.         Judgment: Judgment normal.         Labs:   Recent Results (from the past 48 hour(s))   CBC Oncology    Collection Time: 11/14/22 12:36 PM   Result Value Ref Range    WBC 5.65 3.90 - 12.70 K/uL    RBC 4.96 4.60 - 6.20 M/uL    Hemoglobin 10.0 (L) 14.0 - 18.0 g/dL    Hematocrit 35.6 (L) 40.0 - 54.0 %    MCV 72 (L) 82 - 98 fL    MCH 20.2 (L) 27.0 - 31.0 pg    MCHC 28.1 (L) 32.0 - 36.0 g/dL    RDW 18.3 (H) 11.5 - 14.5 %    Platelets 144 (L) 150 - 450 K/uL    MPV 11.3 9.2 - 12.9 fL    Gran # (ANC) 3.9 1.8 - 7.7 K/uL    Immature Grans (Abs) 0.02 0.00 - 0.04 K/uL     CMP pending at time of visit.     Imaging:       I have personally reviewed the 11/11/22 CT A/P imaging which is notable for mild ascites, no clear evidence of metastatic disease.    Path:   Component 1 mo ago   Final Pathologic Diagnosis 1. Allograft liver, time-zero,  needle biopsy:   - Mild large- and small-droplet macrovesicular steatosis (5-10%)   - No significant fibrosis   - See comment   2. Explant liver, total hepatectomy:   - Adenocarcinoma involving liver, consistent with cholangiocarcinoma   - Background liver with steatohepatitis and cirrhosis (stage 4 of 4);   etiology CASTILLO per clinical history   - Pathologic stage: [id number]   - See synoptic report   SYNOPTIC   Procedure: Total hepatectomy   Histologic Type: Cholangiocarcinoma NOS   Histologic Grade: G3, poorly differentiated   Tumor Focality: Solitary tumor   Tumor Size: 7.0 cm   Tumor Extent: Confined to hepatic parenchyma   Lymphovascular Invasion: Not identified   Margin Status for Invasive Carcinoma:     All margins negative for invasive carcinoma   Margin Status for High-Grade Intraepithelial Neoplasia:     All margins negative for high-grade intraepithelial neoplasia   Regional Lymph Nodes:      Number of Lymph Nodes with Tumor: 0      Number of Lymph Nodes Examined: 1   Pathologic Stage (AJCC 8th Edition):   TNM Descriptors: y (post-treatment)   pT Category: pT1b; Solitary tumor greater than 5 cm without vascular invasion   pN Category: pN0; No regional lymph node metastases   Additional findings:   - Gallbladder with no diagnostic histopathologic alterations            Assessment:       1. 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 At risk for opportunistic infections    4. S/P liver transplant    5. Other ascites    6. Diabetic polyneuropathy associated with type 2 diabetes mellitus    7. PAD (peripheral artery disease)    8. Hyperlipidemia, unspecified hyperlipidemia type    9. Persistent atrial fibrillation    10. Class 1 obesity with serious comorbidity and body mass index (BMI) of 31.0 to 31.9 in adult, unspecified obesity type    11. Anemia in neoplastic disease    12. SOB (shortness of breath)    13. Aortic atherosclerosis            Plan:     # Cholangiocarcinoma  His explant  pathology after OLT on 1/6/22 showed cholangiocarcinoma.  He had received TACE and RFA to the mass that was presumed HCC in 12/2020.  No progression of disease until his transplant.  I discussed with him that the pathology demonstrated cholangiocarcinoma instead of HCC, which does confer a worsened prognosis in the way of higher chance of recurrence. There is essentially no data on optimal management in this scenario in terms of adjuvant chemotherapy - only that the risk of recurrence is relatively high.  Adjuvant therapy would be an extrapolation from surgically resected cholangiocarcinoma in the way of the BILCAP trial with Xeloda x 6 months.      Given his relatively poor performance status at the time of initial consultation, anemia and the marginal survival benefit of adjuvant chemotherapy even in non-transplant patients, I did not think that the benefits of chemotherapy outweigh the risk for Mr. Purdy. He was in agreement with this plan.  We instead proceeded with surveillance.    Surveillance CT CAP on 11/11/22 reviewed and shows LUMA.  Repeat labs in 3 months, then repeat labs and scans in 6 months.    Plan for q6 month scans going forward.     # Liver transplant  S/p transplant on 1/6/2022. Doing well without significant complications.   Continue close f/u with liver transplant team.  Immunosuppressive therapy per transplant.    # Ascites  S/p multiple paracenteses.   Removed 5L first drainage, followed by 2-3L each time after.   Mild ascites noted on scans but clinically stable.  Has not required intervention in several months.   Continue to f/u with hepatology.     # T2DM, PAD, HLD, a-fib, aortic atherosclerosis, obesity  Continue current medical management.  Weight stable in clinic.     # Anemia  Continues on Eliquis. H&H stable.   He will continue to get short interval labs with transplant team.     # SOB  Improving. 2/2 ascites and deconditioning.  Working on increasing physical activity as tolerated.      Follow up: 3 months with labs.     Patient is in agreement with the proposed treatment plan. All questions were answered to the patient's satisfaction. Pt knows to call clinic if anything is needed before the next clinic visit.    Patient discussed with and also seen by collaborating physician, Dr. Ordoñez.      Sindy Cole, MSN, APRN, [facility]  Hematology and Medical Oncology  Clinical Nurse Specialist to Dr. Ordoñez, Dr. Paniagua & Dr. Hayes      Route Chart for Scheduling    Med Onc Chart Routing      Follow up with physician 3 months. RTC in 3 months with labs (CBC,CMP,Ca19-9) to see Dr. Ordoñez   Follow up with [facility]    Infusion scheduling note    Injection scheduling note    Labs CBC, CMP and CA 19-9   Lab interval: every 12 weeks     Imaging    Pharmacy appointment    Other referrals          Therapy Plan Information  filgrastim-sndz (ZARXIO) injection 480 mcg/0.8 mL (Preferred Regimen)  480 mcg, Subcutaneous, PRN

## 2022-11-16 ENCOUNTER — PATIENT MESSAGE (OUTPATIENT)
Dept: TRANSPLANT | Facility: CLINIC | Age: 67
End: 2022-11-16
Payer: MEDICARE

## 2022-11-16 ENCOUNTER — TELEPHONE (OUTPATIENT)
Dept: TRANSPLANT | Facility: CLINIC | Age: 67
End: 2022-11-16
Payer: MEDICARE

## 2022-11-16 NOTE — TELEPHONE ENCOUNTER
Sent patient message via portal to let him know labs are stable.  No medication changes, next labs due on 12/12/22      ----- Message from Thais Maxwell MD sent at 11/15/2022  8:51 PM CST -----  The Labs are stable - please let patient know.

## 2022-12-07 ENCOUNTER — TELEPHONE (OUTPATIENT)
Dept: ENDOCRINOLOGY | Facility: CLINIC | Age: 67
End: 2022-12-07
Payer: MEDICARE

## 2022-12-07 DIAGNOSIS — Z79.4 TYPE 2 DIABETES MELLITUS WITH HYPERGLYCEMIA, WITH LONG-TERM CURRENT USE OF INSULIN: Primary | ICD-10-CM

## 2022-12-07 DIAGNOSIS — E11.65 TYPE 2 DIABETES MELLITUS WITH HYPERGLYCEMIA, WITH LONG-TERM CURRENT USE OF INSULIN: Primary | ICD-10-CM

## 2022-12-07 NOTE — TELEPHONE ENCOUNTER
----- Message from Carlos Argueta MD sent at 12/7/2022  2:19 PM CST -----  Please set pt up for labs before the next appointment

## 2022-12-12 ENCOUNTER — LAB VISIT (OUTPATIENT)
Dept: LAB | Facility: HOSPITAL | Age: 67
End: 2022-12-12
Attending: INTERNAL MEDICINE
Payer: MEDICARE

## 2022-12-12 DIAGNOSIS — Z94.4 S/P LIVER TRANSPLANT: ICD-10-CM

## 2022-12-12 DIAGNOSIS — E83.42 HYPOMAGNESEMIA: ICD-10-CM

## 2022-12-12 LAB
ALBUMIN SERPL BCP-MCNC: 3.8 G/DL (ref 3.5–5.2)
ALP SERPL-CCNC: 69 U/L (ref 55–135)
ALT SERPL W/O P-5'-P-CCNC: 24 U/L (ref 10–44)
ANION GAP SERPL CALC-SCNC: 11 MMOL/L (ref 8–16)
AST SERPL-CCNC: 26 U/L (ref 10–40)
BASOPHILS # BLD AUTO: 0.06 K/UL (ref 0–0.2)
BASOPHILS NFR BLD: 1.2 % (ref 0–1.9)
BILIRUB SERPL-MCNC: 0.5 MG/DL (ref 0.1–1)
BUN SERPL-MCNC: 17 MG/DL (ref 8–23)
CALCIUM SERPL-MCNC: 9.4 MG/DL (ref 8.7–10.5)
CHLORIDE SERPL-SCNC: 101 MMOL/L (ref 95–110)
CO2 SERPL-SCNC: 27 MMOL/L (ref 23–29)
CREAT SERPL-MCNC: 1.2 MG/DL (ref 0.5–1.4)
DIFFERENTIAL METHOD: ABNORMAL
EOSINOPHIL # BLD AUTO: 0.2 K/UL (ref 0–0.5)
EOSINOPHIL NFR BLD: 3.8 % (ref 0–8)
ERYTHROCYTE [DISTWIDTH] IN BLOOD BY AUTOMATED COUNT: 18.6 % (ref 11.5–14.5)
EST. GFR  (NO RACE VARIABLE): >60 ML/MIN/1.73 M^2
GLUCOSE SERPL-MCNC: 205 MG/DL (ref 70–110)
HCT VFR BLD AUTO: 34.4 % (ref 40–54)
HGB BLD-MCNC: 9.6 G/DL (ref 14–18)
IMM GRANULOCYTES # BLD AUTO: 0.02 K/UL (ref 0–0.04)
IMM GRANULOCYTES NFR BLD AUTO: 0.4 % (ref 0–0.5)
LYMPHOCYTES # BLD AUTO: 1 K/UL (ref 1–4.8)
LYMPHOCYTES NFR BLD: 20.7 % (ref 18–48)
MAGNESIUM SERPL-MCNC: 1.7 MG/DL (ref 1.6–2.6)
MCH RBC QN AUTO: 20.3 PG (ref 27–31)
MCHC RBC AUTO-ENTMCNC: 27.9 G/DL (ref 32–36)
MCV RBC AUTO: 73 FL (ref 82–98)
MONOCYTES # BLD AUTO: 0.6 K/UL (ref 0.3–1)
MONOCYTES NFR BLD: 12.7 % (ref 4–15)
NEUTROPHILS # BLD AUTO: 3.1 K/UL (ref 1.8–7.7)
NEUTROPHILS NFR BLD: 61.2 % (ref 38–73)
NRBC BLD-RTO: 0 /100 WBC
PLATELET # BLD AUTO: 128 K/UL (ref 150–450)
PMV BLD AUTO: 11.4 FL (ref 9.2–12.9)
POTASSIUM SERPL-SCNC: 4.2 MMOL/L (ref 3.5–5.1)
PROT SERPL-MCNC: 7.1 G/DL (ref 6–8.4)
RBC # BLD AUTO: 4.72 M/UL (ref 4.6–6.2)
SODIUM SERPL-SCNC: 139 MMOL/L (ref 136–145)
WBC # BLD AUTO: 4.98 K/UL (ref 3.9–12.7)

## 2022-12-12 PROCEDURE — 80053 COMPREHEN METABOLIC PANEL: CPT | Performed by: INTERNAL MEDICINE

## 2022-12-12 PROCEDURE — 80197 ASSAY OF TACROLIMUS: CPT | Performed by: INTERNAL MEDICINE

## 2022-12-12 PROCEDURE — 36415 COLL VENOUS BLD VENIPUNCTURE: CPT | Mod: PN | Performed by: INTERNAL MEDICINE

## 2022-12-12 PROCEDURE — 83735 ASSAY OF MAGNESIUM: CPT | Performed by: INTERNAL MEDICINE

## 2022-12-12 PROCEDURE — 85025 COMPLETE CBC W/AUTO DIFF WBC: CPT | Performed by: INTERNAL MEDICINE

## 2022-12-13 LAB — TACROLIMUS BLD-MCNC: 4.8 NG/ML (ref 5–15)

## 2022-12-15 ENCOUNTER — PATIENT MESSAGE (OUTPATIENT)
Dept: TRANSPLANT | Facility: CLINIC | Age: 67
End: 2022-12-15
Payer: MEDICARE

## 2022-12-15 ENCOUNTER — TELEPHONE (OUTPATIENT)
Dept: TRANSPLANT | Facility: CLINIC | Age: 67
End: 2022-12-15
Payer: MEDICARE

## 2022-12-15 DIAGNOSIS — Z94.4 S/P LIVER TRANSPLANT: Primary | ICD-10-CM

## 2022-12-15 NOTE — TELEPHONE ENCOUNTER
Sent patient message via portal to let him know labs are stable.  No medication changes, next labs due on 1/9/23    ----- Message from Thais Maxwell MD sent at 12/14/2022  4:11 PM CST -----  The Labs are stable - please let patient know.

## 2023-01-04 DIAGNOSIS — Z76.82 AWAITING LIVER TRANSPLANT: ICD-10-CM

## 2023-01-04 DIAGNOSIS — K74.69 OTHER CIRRHOSIS OF LIVER: ICD-10-CM

## 2023-01-04 DIAGNOSIS — K76.6 PORTAL HYPERTENSION: ICD-10-CM

## 2023-01-04 RX ORDER — PANTOPRAZOLE SODIUM 40 MG/1
40 TABLET, DELAYED RELEASE ORAL DAILY
Qty: 30 TABLET | Refills: 5 | Status: SHIPPED | OUTPATIENT
Start: 2023-01-04 | End: 2023-07-24 | Stop reason: SDUPTHER

## 2023-01-09 ENCOUNTER — LAB VISIT (OUTPATIENT)
Dept: LAB | Facility: HOSPITAL | Age: 68
End: 2023-01-09
Attending: INTERNAL MEDICINE
Payer: MEDICARE

## 2023-01-09 DIAGNOSIS — Z94.4 S/P LIVER TRANSPLANT: ICD-10-CM

## 2023-01-09 LAB
ALBUMIN SERPL BCP-MCNC: 3.9 G/DL (ref 3.5–5.2)
ALP SERPL-CCNC: 71 U/L (ref 55–135)
ALT SERPL W/O P-5'-P-CCNC: 25 U/L (ref 10–44)
ANION GAP SERPL CALC-SCNC: 10 MMOL/L (ref 8–16)
AST SERPL-CCNC: 36 U/L (ref 10–40)
BASOPHILS # BLD AUTO: 0.07 K/UL (ref 0–0.2)
BASOPHILS NFR BLD: 1.3 % (ref 0–1.9)
BILIRUB SERPL-MCNC: 0.6 MG/DL (ref 0.1–1)
BUN SERPL-MCNC: 22 MG/DL (ref 8–23)
CALCIUM SERPL-MCNC: 9.5 MG/DL (ref 8.7–10.5)
CHLORIDE SERPL-SCNC: 100 MMOL/L (ref 95–110)
CO2 SERPL-SCNC: 32 MMOL/L (ref 23–29)
CREAT SERPL-MCNC: 1.4 MG/DL (ref 0.5–1.4)
DIFFERENTIAL METHOD: ABNORMAL
EOSINOPHIL # BLD AUTO: 0.3 K/UL (ref 0–0.5)
EOSINOPHIL NFR BLD: 4.5 % (ref 0–8)
ERYTHROCYTE [DISTWIDTH] IN BLOOD BY AUTOMATED COUNT: 18.1 % (ref 11.5–14.5)
EST. GFR  (NO RACE VARIABLE): 54.4 ML/MIN/1.73 M^2
GLUCOSE SERPL-MCNC: 178 MG/DL (ref 70–110)
HCT VFR BLD AUTO: 36 % (ref 40–54)
HGB BLD-MCNC: 9.8 G/DL (ref 14–18)
IMM GRANULOCYTES # BLD AUTO: 0.02 K/UL (ref 0–0.04)
IMM GRANULOCYTES NFR BLD AUTO: 0.4 % (ref 0–0.5)
LYMPHOCYTES # BLD AUTO: 1.3 K/UL (ref 1–4.8)
LYMPHOCYTES NFR BLD: 23 % (ref 18–48)
MCH RBC QN AUTO: 20 PG (ref 27–31)
MCHC RBC AUTO-ENTMCNC: 27.2 G/DL (ref 32–36)
MCV RBC AUTO: 74 FL (ref 82–98)
MONOCYTES # BLD AUTO: 0.7 K/UL (ref 0.3–1)
MONOCYTES NFR BLD: 13.1 % (ref 4–15)
NEUTROPHILS # BLD AUTO: 3.2 K/UL (ref 1.8–7.7)
NEUTROPHILS NFR BLD: 57.7 % (ref 38–73)
NRBC BLD-RTO: 0 /100 WBC
PLATELET # BLD AUTO: 147 K/UL (ref 150–450)
PMV BLD AUTO: 11 FL (ref 9.2–12.9)
POTASSIUM SERPL-SCNC: 3.7 MMOL/L (ref 3.5–5.1)
PROT SERPL-MCNC: 7.3 G/DL (ref 6–8.4)
RBC # BLD AUTO: 4.9 M/UL (ref 4.6–6.2)
SODIUM SERPL-SCNC: 142 MMOL/L (ref 136–145)
WBC # BLD AUTO: 5.56 K/UL (ref 3.9–12.7)

## 2023-01-09 PROCEDURE — 80197 ASSAY OF TACROLIMUS: CPT | Performed by: INTERNAL MEDICINE

## 2023-01-09 PROCEDURE — 80053 COMPREHEN METABOLIC PANEL: CPT | Performed by: INTERNAL MEDICINE

## 2023-01-09 PROCEDURE — 85025 COMPLETE CBC W/AUTO DIFF WBC: CPT | Performed by: INTERNAL MEDICINE

## 2023-01-09 PROCEDURE — 87517 HEPATITIS B DNA QUANT: CPT | Performed by: INTERNAL MEDICINE

## 2023-01-09 PROCEDURE — 36415 COLL VENOUS BLD VENIPUNCTURE: CPT | Mod: PN | Performed by: INTERNAL MEDICINE

## 2023-01-10 LAB — TACROLIMUS BLD-MCNC: 5.3 NG/ML (ref 5–15)

## 2023-01-12 ENCOUNTER — TELEPHONE (OUTPATIENT)
Dept: TRANSPLANT | Facility: CLINIC | Age: 68
End: 2023-01-12
Payer: MEDICARE

## 2023-01-12 ENCOUNTER — PATIENT MESSAGE (OUTPATIENT)
Dept: TRANSPLANT | Facility: CLINIC | Age: 68
End: 2023-01-12
Payer: MEDICARE

## 2023-01-12 DIAGNOSIS — Z94.4 S/P LIVER TRANSPLANT: Primary | ICD-10-CM

## 2023-01-12 NOTE — TELEPHONE ENCOUNTER
Sent patient message via portal to let him know labs are stable.  No medication changes, next labs due on 2/20/23      ----- Message from Thais Maxwell MD sent at 1/12/2023  8:43 AM CST -----  The Labs are stable - please let patient know.

## 2023-01-23 ENCOUNTER — OFFICE VISIT (OUTPATIENT)
Dept: ENDOCRINOLOGY | Facility: CLINIC | Age: 68
End: 2023-01-23
Payer: MEDICARE

## 2023-01-23 VITALS
HEART RATE: 83 BPM | DIASTOLIC BLOOD PRESSURE: 83 MMHG | OXYGEN SATURATION: 93 % | HEIGHT: 72 IN | WEIGHT: 222.69 LBS | BODY MASS INDEX: 30.16 KG/M2 | SYSTOLIC BLOOD PRESSURE: 179 MMHG

## 2023-01-23 DIAGNOSIS — Z79.4 TYPE 2 DIABETES MELLITUS WITH HYPERGLYCEMIA, WITH LONG-TERM CURRENT USE OF INSULIN: Primary | ICD-10-CM

## 2023-01-23 DIAGNOSIS — E11.65 TYPE 2 DIABETES MELLITUS WITH HYPERGLYCEMIA, WITH LONG-TERM CURRENT USE OF INSULIN: Primary | ICD-10-CM

## 2023-01-23 DIAGNOSIS — I10 PRIMARY HYPERTENSION: ICD-10-CM

## 2023-01-23 DIAGNOSIS — E11.42 DIABETIC POLYNEUROPATHY ASSOCIATED WITH TYPE 2 DIABETES MELLITUS: ICD-10-CM

## 2023-01-23 DIAGNOSIS — E78.5 HYPERLIPIDEMIA, UNSPECIFIED HYPERLIPIDEMIA TYPE: ICD-10-CM

## 2023-01-23 PROCEDURE — 99214 OFFICE O/P EST MOD 30 MIN: CPT | Mod: S$GLB,,, | Performed by: INTERNAL MEDICINE

## 2023-01-23 PROCEDURE — 1101F PT FALLS ASSESS-DOCD LE1/YR: CPT | Mod: CPTII,S$GLB,, | Performed by: INTERNAL MEDICINE

## 2023-01-23 PROCEDURE — 1126F AMNT PAIN NOTED NONE PRSNT: CPT | Mod: CPTII,S$GLB,, | Performed by: INTERNAL MEDICINE

## 2023-01-23 PROCEDURE — 1159F MED LIST DOCD IN RCRD: CPT | Mod: CPTII,S$GLB,, | Performed by: INTERNAL MEDICINE

## 2023-01-23 PROCEDURE — 1160F PR REVIEW ALL MEDS BY PRESCRIBER/CLIN PHARMACIST DOCUMENTED: ICD-10-PCS | Mod: CPTII,S$GLB,, | Performed by: INTERNAL MEDICINE

## 2023-01-23 PROCEDURE — 3077F SYST BP >= 140 MM HG: CPT | Mod: CPTII,S$GLB,, | Performed by: INTERNAL MEDICINE

## 2023-01-23 PROCEDURE — 99214 PR OFFICE/OUTPT VISIT, EST, LEVL IV, 30-39 MIN: ICD-10-PCS | Mod: S$GLB,,, | Performed by: INTERNAL MEDICINE

## 2023-01-23 PROCEDURE — 3008F PR BODY MASS INDEX (BMI) DOCUMENTED: ICD-10-PCS | Mod: CPTII,S$GLB,, | Performed by: INTERNAL MEDICINE

## 2023-01-23 PROCEDURE — 1159F PR MEDICATION LIST DOCUMENTED IN MEDICAL RECORD: ICD-10-PCS | Mod: CPTII,S$GLB,, | Performed by: INTERNAL MEDICINE

## 2023-01-23 PROCEDURE — 1101F PR PT FALLS ASSESS DOC 0-1 FALLS W/OUT INJ PAST YR: ICD-10-PCS | Mod: CPTII,S$GLB,, | Performed by: INTERNAL MEDICINE

## 2023-01-23 PROCEDURE — 3079F PR MOST RECENT DIASTOLIC BLOOD PRESSURE 80-89 MM HG: ICD-10-PCS | Mod: CPTII,S$GLB,, | Performed by: INTERNAL MEDICINE

## 2023-01-23 PROCEDURE — 3077F PR MOST RECENT SYSTOLIC BLOOD PRESSURE >= 140 MM HG: ICD-10-PCS | Mod: CPTII,S$GLB,, | Performed by: INTERNAL MEDICINE

## 2023-01-23 PROCEDURE — 3079F DIAST BP 80-89 MM HG: CPT | Mod: CPTII,S$GLB,, | Performed by: INTERNAL MEDICINE

## 2023-01-23 PROCEDURE — 3288F PR FALLS RISK ASSESSMENT DOCUMENTED: ICD-10-PCS | Mod: CPTII,S$GLB,, | Performed by: INTERNAL MEDICINE

## 2023-01-23 PROCEDURE — 1126F PR PAIN SEVERITY QUANTIFIED, NO PAIN PRESENT: ICD-10-PCS | Mod: CPTII,S$GLB,, | Performed by: INTERNAL MEDICINE

## 2023-01-23 PROCEDURE — 3008F BODY MASS INDEX DOCD: CPT | Mod: CPTII,S$GLB,, | Performed by: INTERNAL MEDICINE

## 2023-01-23 PROCEDURE — 1160F RVW MEDS BY RX/DR IN RCRD: CPT | Mod: CPTII,S$GLB,, | Performed by: INTERNAL MEDICINE

## 2023-01-23 PROCEDURE — 3288F FALL RISK ASSESSMENT DOCD: CPT | Mod: CPTII,S$GLB,, | Performed by: INTERNAL MEDICINE

## 2023-01-23 RX ORDER — GABAPENTIN 300 MG/1
300 CAPSULE ORAL 3 TIMES DAILY PRN
Qty: 90 CAPSULE | Refills: 5 | Status: ON HOLD | OUTPATIENT
Start: 2023-01-23 | End: 2024-01-30 | Stop reason: CLARIF

## 2023-01-23 NOTE — ASSESSMENT & PLAN NOTE
Lab Results   Component Value Date    LDLCALC 65.2 09/19/2022     LDL at goal  Continue meds  Monitor yearly

## 2023-01-23 NOTE — PATIENT INSTRUCTIONS
For diabetes:    Continue insulins for now.    Basal Insulin  Take 22 units of lantus insulin.    Check your glucose in the morning before breakfast and keep a log.  Goal AM glucose:       After 3-5 days, if your average glucose is above 140, increase your dose by 2 units  If your AM glucose is under 90, decrease by 2 units.    Meal-time insulin    Take 6 units of humalog with each meal (works best when taken about 5-10 minutes before you eat). If you skip a meal, skip this meal-time insulin.    Plus the scale based on sugar levels.

## 2023-01-23 NOTE — ASSESSMENT & PLAN NOTE
Reviewed goals of therapy - to get the best control we can without hypoglycemia. Goal a1c <7.5   - last a1c remains below goal    Glucose high in AM. Close to goal in PM    Medication changes:   Increase lantus to 22 units qHS   continue humalog 6 units TIDWM + scale     Reviewed dose titration. Details in AVS     -Advised occasional self blood glucose monitoring. Patient encouraged to document glucose results and bring them to every clinic visit      -Hypoglycemia precautions discussed. Instructed on precautions before driving. Had discussed with liver disease, in theory higher risks associated with hypoglycemia if unable to mount normal gluconeogenesis response    -Close adherence to lifestyle changes recommended.    -Periodic follow ups for eye evaluations, foot care suggested.

## 2023-01-23 NOTE — ASSESSMENT & PLAN NOTE
BP high today   pt notes white coat HTN   recommend check BP at home once a week, keep track, if often higher there too then would benefit from medication adjustments.   f/u with PCP, monitor BP

## 2023-01-23 NOTE — PROGRESS NOTES
Subjective:      Chief Complaint: Diabetes    HPI: Tej Purdy is a 67 y.o. male who is here for a follow-up evaluation for diabetes.  Last seen 2022. Had recommended f/u later in , not done. Had also recommended labs before this appointment, not done.    Of note, pt PMH includes HCC. S/p TACE and RFA.   S/p liver transplant 2022. 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  Also found to have afib (during evaluation for RHC as part of liver transplant evaluation). S/p 3x DCCV, now on amiodarone. On coumadin.    Has HTN. BP elevated today.   Pt notes white coat HTN   Doesn't check at home, has a machine to measure it though    With regards to the diabetes:  Diagnosed with T2DM since around .     Known complications:     Retinopathy? No    Nephropathy? No    Neuropathy? Yes    CAD? No    CVA? No     Current regimen:   Lantus 20 units qHS (previously was as high as 42 units before a hospitalization)   humalog 6 units plus scale (+2 if 160-230, +4 if above that, up to 10 units)    Missed doses? denies     Prior treatments:    Glipizide 5 mg BID    Metformin 1,000 mg bid    Self-Monitored blood glucose.  # times a day testin/day  Log reviewed: No    178 on last labs at 830am.    qHS: 180-190s mostly    Hypoglycemic events? Not lately, though down to 70s pretty often    Current Symptoms  No   Yes  [x]    []  Polydipsia  []    [x]  Polyuria, chronic  [x]    []  Vision changes  [x]    []  Nausea  [x]    []  Diarrhea  [x]    []  Weight gain  [x]    []  Weight loss    Neuropathy    Diabetes Management Status    Statin: Taking  ACE/ARB: Taking    Screening or Prevention Patient's value Goal Complete/Controlled?   HgA1C Testing and Control   Lab Results   Component Value Date    HGBA1C 7.0 (H) 2022      q6months/Less than 7.5% Yes   Lipid profile : 2022 Annually Yes   LDL control Lab Results   Component Value Date    LDLCALC 65.2 2022    Annually/Less than 100 mg/dl   Yes   Nephropathy screening Lab Results   Component Value Date    MICALBCREAT 17.4 06/29/2021     Lab Results   Component Value Date    CREATININE 1.4 01/09/2023     Lab Results   Component Value Date    PROTEINUA Negative 02/02/2022    Annually no   Blood pressure BP Readings from Last 1 Encounters:   01/23/23 (!) 179/83    Less than 140/90 No   Dilated retinal exam Most Recent Eye Exam Date: Not Found Annually no   Foot exam   : 02/03/2021 Annually no     Reviewed past medical, family, social history and updated as appropriate.    Review of Systems  As above    Objective:     Vitals:    01/23/23 1416   BP: (!) 179/83   Pulse: 83     BP Readings from Last 5 Encounters:   01/23/23 (!) 179/83   11/14/22 (!) 148/70   08/22/22 (!) 142/71   08/22/22 (!) 156/74   07/05/22 122/78     Physical Exam  Vitals reviewed.   Constitutional:       General: He is not in acute distress.  Neck:      Thyroid: No thyromegaly.   Cardiovascular:      Rate and Rhythm: Regular rhythm.      Heart sounds: Murmur heard.   Pulmonary:      Effort: Pulmonary effort is normal.     Wt Readings from Last 10 Encounters:   01/23/23 1416 101 kg (222 lb 10.6 oz)   11/14/22 1312 99.9 kg (220 lb 3.8 oz)   08/22/22 1511 96.9 kg (213 lb 8.3 oz)   08/22/22 1116 96.7 kg (213 lb 3 oz)   07/05/22 1138 103.4 kg (228 lb)   06/30/22 1501 99.8 kg (220 lb 0.3 oz)   06/20/22 1614 103.7 kg (228 lb 8.1 oz)   05/24/22 1353 98.5 kg (217 lb 2.5 oz)   05/23/22 1507 98 kg (216 lb)   05/16/22 1546 98.8 kg (217 lb 13 oz)     Lab Results   Component Value Date    HGBA1C 7.0 (H) 09/19/2022     Lab Results   Component Value Date    CHOL 130 09/19/2022    HDL 36 (L) 09/19/2022    LDLCALC 65.2 09/19/2022    TRIG 144 09/19/2022    CHOLHDL 27.7 09/19/2022     Lab Results   Component Value Date     01/09/2023    K 3.7 01/09/2023     01/09/2023    CO2 32 (H) 01/09/2023     (H) 01/09/2023    BUN 22 01/09/2023    CREATININE 1.4 01/09/2023    CALCIUM 9.5 01/09/2023     PROT 7.3 01/09/2023    ALBUMIN 3.9 01/09/2023    BILITOT 0.6 01/09/2023    ALKPHOS 71 01/09/2023    AST 36 01/09/2023    ALT 25 01/09/2023    ANIONGAP 10 01/09/2023    ESTGFRAFRICA >60.0 07/18/2022    EGFRNONAA >60.0 07/18/2022    TSH 1.418 02/04/2022      Lab Results   Component Value Date    MICALBCREAT 17.4 06/29/2021       Assessment/Plan:     Type 2 diabetes mellitus  Reviewed goals of therapy - to get the best control we can without hypoglycemia. Goal a1c <7.5   - last a1c remains below goal    Glucose high in AM. Close to goal in PM    Medication changes:   Increase lantus to 22 units qHS   continue humalog 6 units TIDWM + scale     Reviewed dose titration. Details in AVS     -Advised occasional self blood glucose monitoring. Patient encouraged to document glucose results and bring them to every clinic visit      -Hypoglycemia precautions discussed. Instructed on precautions before driving. Had discussed with liver disease, in theory higher risks associated with hypoglycemia if unable to mount normal gluconeogenesis response    -Close adherence to lifestyle changes recommended.    -Periodic follow ups for eye evaluations, foot care suggested.        Hyperlipidemia  Lab Results   Component Value Date    LDLCALC 65.2 09/19/2022     LDL at goal  Continue meds  Monitor yearly      Diabetic neuropathy   Encourage glycemic control as above   refill gabapentin   monitor      HTN (hypertension)  BP high today   pt notes white coat HTN   recommend check BP at home once a week, keep track, if often higher there too then would benefit from medication adjustments.   f/u with PCP, monitor BP    Follow up in 4 months (on 5/23/2023) for further monitoring, lab review.      Carlos Argueta MD  Endocrinology

## 2023-01-25 ENCOUNTER — HOSPITAL ENCOUNTER (OUTPATIENT)
Dept: RADIOLOGY | Facility: HOSPITAL | Age: 68
Discharge: HOME | End: 2023-01-25
Attending: INTERNAL MEDICINE
Payer: MEDICARE

## 2023-01-25 ENCOUNTER — TELEPHONE (OUTPATIENT)
Dept: TRANSPLANT | Facility: CLINIC | Age: 68
End: 2023-01-25
Payer: MEDICARE

## 2023-01-25 ENCOUNTER — PATIENT MESSAGE (OUTPATIENT)
Dept: TRANSPLANT | Facility: CLINIC | Age: 68
End: 2023-01-25
Payer: MEDICARE

## 2023-01-25 DIAGNOSIS — Z94.4 S/P LIVER TRANSPLANT: ICD-10-CM

## 2023-01-25 PROCEDURE — 93976 VASCULAR STUDY: CPT | Mod: TC

## 2023-01-25 PROCEDURE — 76705 ECHO EXAM OF ABDOMEN: CPT | Mod: 26,XS,, | Performed by: RADIOLOGY

## 2023-01-25 PROCEDURE — 76705 US DOPPLER LIVER TRANSPLANT POST (XPD): ICD-10-PCS | Mod: 26,XS,, | Performed by: RADIOLOGY

## 2023-01-25 PROCEDURE — 93976 VASCULAR STUDY: CPT | Mod: 26,,, | Performed by: RADIOLOGY

## 2023-01-25 PROCEDURE — 93976 US DOPPLER LIVER TRANSPLANT POST (XPD): ICD-10-PCS | Mod: 26,,, | Performed by: RADIOLOGY

## 2023-01-25 RX ORDER — SERTRALINE HYDROCHLORIDE 25 MG/1
25 TABLET, FILM COATED ORAL DAILY
Qty: 30 TABLET | Refills: 5 | Status: SHIPPED | OUTPATIENT
Start: 2023-01-25 | End: 2023-10-24

## 2023-01-25 NOTE — TELEPHONE ENCOUNTER
Sent message to let patient know    ----- Message from Thais Maxwell MD sent at 1/25/2023  1:42 PM CST -----  Satisfactory US - please let patient know.

## 2023-02-01 ENCOUNTER — OFFICE VISIT (OUTPATIENT)
Dept: TRANSPLANT | Facility: CLINIC | Age: 68
End: 2023-02-01
Payer: MEDICARE

## 2023-02-01 VITALS
TEMPERATURE: 98 F | SYSTOLIC BLOOD PRESSURE: 182 MMHG | OXYGEN SATURATION: 97 % | WEIGHT: 226.19 LBS | HEIGHT: 72 IN | HEART RATE: 65 BPM | BODY MASS INDEX: 30.64 KG/M2 | DIASTOLIC BLOOD PRESSURE: 79 MMHG | RESPIRATION RATE: 18 BRPM

## 2023-02-01 DIAGNOSIS — Z79.60 LONG-TERM USE OF IMMUNOSUPPRESSANT MEDICATION: ICD-10-CM

## 2023-02-01 DIAGNOSIS — Z94.4 S/P LIVER TRANSPLANT: ICD-10-CM

## 2023-02-01 DIAGNOSIS — Z29.89 PROPHYLACTIC IMMUNOTHERAPY: Primary | ICD-10-CM

## 2023-02-01 DIAGNOSIS — Z86.010 PERSONAL HISTORY OF COLONIC POLYPS: ICD-10-CM

## 2023-02-01 DIAGNOSIS — R97.0 ELEVATED CARCINOEMBRYONIC ANTIGEN (CEA): ICD-10-CM

## 2023-02-01 DIAGNOSIS — C44.90 SKIN CANCER: ICD-10-CM

## 2023-02-01 DIAGNOSIS — C22.1 CHOLANGIOCARCINOMA: Primary | ICD-10-CM

## 2023-02-01 DIAGNOSIS — R18.8 OTHER ASCITES: ICD-10-CM

## 2023-02-01 DIAGNOSIS — Z79.818 LONG TERM (CURRENT) USE OF OTHER AGENTS AFFECTING ESTROGEN RECEPTORS AND ESTROGEN LEVELS: ICD-10-CM

## 2023-02-01 DIAGNOSIS — C22.1 CHOLANGIOCARCINOMA: ICD-10-CM

## 2023-02-01 PROCEDURE — 1160F RVW MEDS BY RX/DR IN RCRD: CPT | Mod: CPTII,S$GLB,, | Performed by: INTERNAL MEDICINE

## 2023-02-01 PROCEDURE — 3008F PR BODY MASS INDEX (BMI) DOCUMENTED: ICD-10-PCS | Mod: CPTII,S$GLB,, | Performed by: INTERNAL MEDICINE

## 2023-02-01 PROCEDURE — 1101F PR PT FALLS ASSESS DOC 0-1 FALLS W/OUT INJ PAST YR: ICD-10-PCS | Mod: CPTII,S$GLB,, | Performed by: INTERNAL MEDICINE

## 2023-02-01 PROCEDURE — 99214 PR OFFICE/OUTPT VISIT, EST, LEVL IV, 30-39 MIN: ICD-10-PCS | Mod: S$GLB,,, | Performed by: INTERNAL MEDICINE

## 2023-02-01 PROCEDURE — 1160F PR REVIEW ALL MEDS BY PRESCRIBER/CLIN PHARMACIST DOCUMENTED: ICD-10-PCS | Mod: CPTII,S$GLB,, | Performed by: INTERNAL MEDICINE

## 2023-02-01 PROCEDURE — 3077F PR MOST RECENT SYSTOLIC BLOOD PRESSURE >= 140 MM HG: ICD-10-PCS | Mod: CPTII,S$GLB,, | Performed by: INTERNAL MEDICINE

## 2023-02-01 PROCEDURE — 1159F PR MEDICATION LIST DOCUMENTED IN MEDICAL RECORD: ICD-10-PCS | Mod: CPTII,S$GLB,, | Performed by: INTERNAL MEDICINE

## 2023-02-01 PROCEDURE — 99214 OFFICE O/P EST MOD 30 MIN: CPT | Mod: S$GLB,,, | Performed by: INTERNAL MEDICINE

## 2023-02-01 PROCEDURE — 3078F PR MOST RECENT DIASTOLIC BLOOD PRESSURE < 80 MM HG: ICD-10-PCS | Mod: CPTII,S$GLB,, | Performed by: INTERNAL MEDICINE

## 2023-02-01 PROCEDURE — 3288F FALL RISK ASSESSMENT DOCD: CPT | Mod: CPTII,S$GLB,, | Performed by: INTERNAL MEDICINE

## 2023-02-01 PROCEDURE — 3077F SYST BP >= 140 MM HG: CPT | Mod: CPTII,S$GLB,, | Performed by: INTERNAL MEDICINE

## 2023-02-01 PROCEDURE — 1126F AMNT PAIN NOTED NONE PRSNT: CPT | Mod: CPTII,S$GLB,, | Performed by: INTERNAL MEDICINE

## 2023-02-01 PROCEDURE — 1101F PT FALLS ASSESS-DOCD LE1/YR: CPT | Mod: CPTII,S$GLB,, | Performed by: INTERNAL MEDICINE

## 2023-02-01 PROCEDURE — 1159F MED LIST DOCD IN RCRD: CPT | Mod: CPTII,S$GLB,, | Performed by: INTERNAL MEDICINE

## 2023-02-01 PROCEDURE — 3078F DIAST BP <80 MM HG: CPT | Mod: CPTII,S$GLB,, | Performed by: INTERNAL MEDICINE

## 2023-02-01 PROCEDURE — 99999 PR PBB SHADOW E&M-EST. PATIENT-LVL V: CPT | Mod: PBBFAC,,, | Performed by: INTERNAL MEDICINE

## 2023-02-01 PROCEDURE — 3008F BODY MASS INDEX DOCD: CPT | Mod: CPTII,S$GLB,, | Performed by: INTERNAL MEDICINE

## 2023-02-01 PROCEDURE — 3288F PR FALLS RISK ASSESSMENT DOCUMENTED: ICD-10-PCS | Mod: CPTII,S$GLB,, | Performed by: INTERNAL MEDICINE

## 2023-02-01 PROCEDURE — 1126F PR PAIN SEVERITY QUANTIFIED, NO PAIN PRESENT: ICD-10-PCS | Mod: CPTII,S$GLB,, | Performed by: INTERNAL MEDICINE

## 2023-02-01 PROCEDURE — 99999 PR PBB SHADOW E&M-EST. PATIENT-LVL V: ICD-10-PCS | Mod: PBBFAC,,, | Performed by: INTERNAL MEDICINE

## 2023-02-01 RX ORDER — TACROLIMUS 0.5 MG/1
0.5 CAPSULE ORAL DAILY
Qty: 60 CAPSULE | Refills: 11 | Status: SHIPPED | OUTPATIENT
Start: 2023-02-01 | End: 2023-02-23

## 2023-02-01 RX ORDER — FUROSEMIDE 40 MG/1
20 TABLET ORAL DAILY
Qty: 15 TABLET | Refills: 11 | Status: SHIPPED | OUTPATIENT
Start: 2023-02-01 | End: 2023-03-01

## 2023-02-01 NOTE — LETTER
February 1, 2023        Jessika Carbone  [address]  [facility] [address]  Phone: [phone]  Fax: [phone]             [facility] - Liver Transplant  [address] [address]  Phone: [phone]  Fax: [phone]   Patient: Tej Purdy   MR Number: [id number]   YOB: 1955   Date of Visit: 2/1/2023       Dear Dr. Jessika Carbone    Thank you for referring Tej Purdy to me for evaluation. Attached you will find relevant portions of my assessment and plan of care.    If you have questions, please do not hesitate to call me. I look forward to following Tej Purdy along with you.    Sincerely,    Thais Maxwell MD    Enclosure    If you would like to receive this communication electronically, please contact [email or web address] or [phone] to request [employer] Link access.    [employer] Link is a tool which provides read-only access to select patient information with whom you have a relationship. Its easy to use and provides real time access to review your patients record including encounter summaries, notes, results, and demographic information.    If you feel you have received this communication in error or would no longer like to receive these types of communications, please e-mail [email or web address]

## 2023-02-01 NOTE — PROGRESS NOTES
Transplant Hepatology  Liver Transplant Recipient Follow-up    Transplant Date: 1/6/2022  UNOS Native Liver Dx: Primary Liver Malignancy: Hepatoma (HCC) (final path revealed cholangiocarcinoma) and Cirrhosis    Tej is here for follow up of his liver transplant.    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     Subjective:     Interval History: Tej is now 1 year post liver transplant. Currently, he is well. He has better energy and is less fatigued. He remains on 40 mg of lasix per day, but is no longer retaining as much fluid. Last paracentesis was 6/23/22.    Allograft function 1/9/23: ALT 25, AST 36, ALKP 71, Tbil 0.6, creat 1.4, prograf 5.3, CA 19-9 <2.0 on 11/14/22  IS: prograf and cellcept 500/500  Immunoprophylaxis:  PCP PROPHYLAXIS: Bactrim completed 7/7/2022  CMV PROPHYLAXIS: valcyte completed 7/7/2022;   FUNGAL PROPHYLAXISL nystatin completed  Aspirin: YES/NO(WHY) DOSE: 81 mg   Apixaban for AFIB    Abdo US doppler 1/25/23: satisfactory    Hx cholangiocarcinoma: saw oncology: Given his relatively poor performance status, anemia and the marginal survival benefit of adjuvant chemotherapy even in non-transplant patients, I do not think that the benefits of chemotherapy outweigh the risk for Mr. Purdy. He is in agreement with this plan.  We will instead proceed with surveillance.  Plan for imaging every 3 months.    Ct chest/abdo/pelvis w contrast 11/11/22: no obvious cholangio; small  fluid    Review of Systems   Constitutional:  Positive for fatigue.   HENT: Negative.     Eyes: Negative.    Respiratory: Negative.     Cardiovascular: Negative.    Genitourinary: Negative.    Musculoskeletal: Negative.    Skin: Negative.    Psychiatric/Behavioral: Negative.       Objective:     Vitals:    02/01/23 1434   BP: (!) 182/79   Pulse: 65   Resp: 18   Temp: 98 °F (36.7 °C)       Physical Exam  Vitals reviewed.   Constitutional:       Appearance: He is well-developed.   HENT:      Head: Normocephalic and atraumatic.   Eyes:      General: No scleral icterus.     Conjunctiva/sclera: Conjunctivae normal.      Pupils: Pupils are equal, round, and reactive to light.   Neck:      Thyroid: No thyromegaly.   Cardiovascular:      Rate and Rhythm: Normal rate and regular rhythm.      Heart sounds: Normal heart sounds.   Pulmonary:      Effort: Pulmonary effort is normal.      Breath sounds: Normal breath sounds. No rales.   Abdominal:      General: Bowel sounds are normal. There is no distension.      Palpations: Abdomen is soft. There is no mass.      Tenderness: There is no abdominal tenderness.   Musculoskeletal:         General: Normal range of motion.      Cervical back: Normal range of motion and neck supple.   Skin:     General: Skin is warm and dry.      Findings: No rash.   Neurological:      Mental Status: He is alert and oriented to person, place, and time.     Lab Results   Component Value Date    BILITOT 0.6 01/09/2023    AST 36 01/09/2023    ALT 25 01/09/2023    ALKPHOS 71 01/09/2023    CREATININE 1.4 01/09/2023    ALBUMIN 3.9 01/09/2023     Lab Results   Component Value Date    WBC 5.56 01/09/2023    HGB 9.8 (L) 01/09/2023    HCT 36.0 (L) 01/09/2023    HCT 33 (L) 01/06/2022     (L) 01/09/2023     Lab Results   Component Value Date    TACROLIMUS 5.3 01/09/2023       Assessment/Plan:   The patient is a 67 year old male who is now 1 year post liver transplant. He is overall doing well. Current  recommendations:  1. S/p liver transplant and control of IS: good allograft function, prograf target 3-4; will lower to 0.5 mg daily; labs Monday as scheduled and then in 2 weeks-if stable then can reduce to monthly; continue cellcept;   2. Prophylaxis:  Aspirin: YES/NO(WHY) DOSE: 81 mg   *Apixaban for AFIB*  3. Cholangiocarcinoma: chemotx deferred; surveillance imaging and cea/ca19-9 every 3 months-next due 11/22  4. Ascites: reduce lasix to 20 mg from 40 mg daily  5.  Health maintenance: the patient should see a dermatologist annually to screen for skin cancer, perform regular colonoscopies (due 2023)  6. R/O osteoporosis. I am recommending a bone density to r/o osteoporosis.     Return 6 months    Thais Maxwell MD           [facility] Patient Status  Functional Status: 70% - Cares for self: unable to carry on normal activity or active work  Physical Capacity: No Limitations    New diabetes onset between last follow-up to the current follow-up: No  Did patient have any acute rejection episodes during the follow-up period: No  Post transplant malignancy: No

## 2023-02-01 NOTE — PATIENT INSTRUCTIONS
Annual dermatology evaluation  Colonoscopy 2023 since multiple polyps 2020  Bone density  Lower prograf to 0.5 mg daily and do labs as scheduled on Monday and then in 2 weeks and if stable can reduce to every 4 weeks  Surveillance for cholangio per oncology  Decrease lasix to 20 mg daily and monitor for fluid retention  Return in 6 months

## 2023-02-06 ENCOUNTER — LAB VISIT (OUTPATIENT)
Dept: LAB | Facility: HOSPITAL | Age: 68
End: 2023-02-06
Attending: INTERNAL MEDICINE
Payer: MEDICARE

## 2023-02-06 ENCOUNTER — PATIENT MESSAGE (OUTPATIENT)
Dept: TRANSPLANT | Facility: CLINIC | Age: 68
End: 2023-02-06
Payer: MEDICARE

## 2023-02-06 DIAGNOSIS — Z94.4 S/P LIVER TRANSPLANT: ICD-10-CM

## 2023-02-06 DIAGNOSIS — E83.42 HYPOMAGNESEMIA: ICD-10-CM

## 2023-02-06 LAB
ALBUMIN SERPL BCP-MCNC: 4 G/DL (ref 3.5–5.2)
ALP SERPL-CCNC: 60 U/L (ref 55–135)
ALT SERPL W/O P-5'-P-CCNC: 27 U/L (ref 10–44)
ANION GAP SERPL CALC-SCNC: 11 MMOL/L (ref 8–16)
AST SERPL-CCNC: 35 U/L (ref 10–40)
BASOPHILS # BLD AUTO: 0.1 K/UL (ref 0–0.2)
BASOPHILS NFR BLD: 1.3 % (ref 0–1.9)
BILIRUB SERPL-MCNC: 0.7 MG/DL (ref 0.1–1)
BUN SERPL-MCNC: 16 MG/DL (ref 8–23)
CALCIUM SERPL-MCNC: 9.5 MG/DL (ref 8.7–10.5)
CHLORIDE SERPL-SCNC: 100 MMOL/L (ref 95–110)
CO2 SERPL-SCNC: 25 MMOL/L (ref 23–29)
CREAT SERPL-MCNC: 1.2 MG/DL (ref 0.5–1.4)
DIFFERENTIAL METHOD: ABNORMAL
EOSINOPHIL # BLD AUTO: 0.3 K/UL (ref 0–0.5)
EOSINOPHIL NFR BLD: 3.6 % (ref 0–8)
ERYTHROCYTE [DISTWIDTH] IN BLOOD BY AUTOMATED COUNT: 19.4 % (ref 11.5–14.5)
EST. GFR  (NO RACE VARIABLE): >60 ML/MIN/1.73 M^2
GLUCOSE SERPL-MCNC: 116 MG/DL (ref 70–110)
HCT VFR BLD AUTO: 41.5 % (ref 40–54)
HGB BLD-MCNC: 11.5 G/DL (ref 14–18)
IMM GRANULOCYTES # BLD AUTO: 0.03 K/UL (ref 0–0.04)
IMM GRANULOCYTES NFR BLD AUTO: 0.4 % (ref 0–0.5)
LYMPHOCYTES # BLD AUTO: 2 K/UL (ref 1–4.8)
LYMPHOCYTES NFR BLD: 25.4 % (ref 18–48)
MAGNESIUM SERPL-MCNC: 1.7 MG/DL (ref 1.6–2.6)
MCH RBC QN AUTO: 20.4 PG (ref 27–31)
MCHC RBC AUTO-ENTMCNC: 27.7 G/DL (ref 32–36)
MCV RBC AUTO: 74 FL (ref 82–98)
MONOCYTES # BLD AUTO: 0.8 K/UL (ref 0.3–1)
MONOCYTES NFR BLD: 10 % (ref 4–15)
NEUTROPHILS # BLD AUTO: 4.7 K/UL (ref 1.8–7.7)
NEUTROPHILS NFR BLD: 59.3 % (ref 38–73)
NRBC BLD-RTO: 0 /100 WBC
PLATELET # BLD AUTO: 196 K/UL (ref 150–450)
PMV BLD AUTO: 11.7 FL (ref 9.2–12.9)
POTASSIUM SERPL-SCNC: 3.9 MMOL/L (ref 3.5–5.1)
PROT SERPL-MCNC: 7.4 G/DL (ref 6–8.4)
RBC # BLD AUTO: 5.63 M/UL (ref 4.6–6.2)
SODIUM SERPL-SCNC: 136 MMOL/L (ref 136–145)
WBC # BLD AUTO: 7.88 K/UL (ref 3.9–12.7)

## 2023-02-06 PROCEDURE — 80053 COMPREHEN METABOLIC PANEL: CPT | Performed by: INTERNAL MEDICINE

## 2023-02-06 PROCEDURE — 80197 ASSAY OF TACROLIMUS: CPT | Performed by: INTERNAL MEDICINE

## 2023-02-06 PROCEDURE — 85025 COMPLETE CBC W/AUTO DIFF WBC: CPT | Performed by: INTERNAL MEDICINE

## 2023-02-06 PROCEDURE — 83735 ASSAY OF MAGNESIUM: CPT | Performed by: INTERNAL MEDICINE

## 2023-02-06 PROCEDURE — 36415 COLL VENOUS BLD VENIPUNCTURE: CPT | Mod: PN | Performed by: INTERNAL MEDICINE

## 2023-02-07 ENCOUNTER — TELEPHONE (OUTPATIENT)
Dept: TRANSPLANT | Facility: CLINIC | Age: 68
End: 2023-02-07
Payer: MEDICARE

## 2023-02-07 ENCOUNTER — PATIENT MESSAGE (OUTPATIENT)
Dept: TRANSPLANT | Facility: CLINIC | Age: 68
End: 2023-02-07
Payer: MEDICARE

## 2023-02-07 LAB — TACROLIMUS BLD-MCNC: 3.2 NG/ML (ref 5–15)

## 2023-02-07 NOTE — TELEPHONE ENCOUNTER
Sent patient message via portal to let him know labs are stable.  No medication changes, next labs due on 02/20/23      ----- Message from Thais Maxwell MD sent at 2/7/2023 12:17 PM CST -----  The Labs are stable - please let patient know.

## 2023-02-10 ENCOUNTER — TELEPHONE (OUTPATIENT)
Dept: RESEARCH | Facility: HOSPITAL | Age: 68
End: 2023-02-10
Payer: MEDICARE

## 2023-02-10 NOTE — TELEPHONE ENCOUNTER
I called Mr. Purdy to introduce the PROGRESS clinical trial to him. I gave a thorough synopsis of the trial. He stated he is not looking to participate in any trial that involved an intervention/procedure at this time. I thanked him for his time.

## 2023-02-20 ENCOUNTER — PATIENT MESSAGE (OUTPATIENT)
Dept: TRANSPLANT | Facility: CLINIC | Age: 68
End: 2023-02-20
Payer: MEDICARE

## 2023-02-20 NOTE — ASSESSMENT & PLAN NOTE
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 Replaced LHA, reconstructed.     [facility] Medicine Discharge Summary    Admit Date: 2/19/2023  Discharge Date and Time: 2/20/20235:38 AM  Admitting Physician:  Team  Discharging Physician: Jef Nichols MD.  Primary Care Physician: Nando Albarado MD  Consults: None    Discharge Diagnoses:  Sickle cell vaso-occlusive pain crisis   Hemolysis secondary to above    Hospital Course:    Please see documented titled H&P, patient never really was admitted to the hospital he just received blood in the ER and he was cleared for discharge by me.      Vitals:  VITAL SIGNS: 24 HRS MIN & MAX LAST   Temp  Min: 97.9 °F (36.6 °C)  Max: 98.3 °F (36.8 °C) 97.9 °F (36.6 °C)   BP  Min: 100/67  Max: 120/68 119/78   Pulse  Min: 66  Max: 90  66   Resp  Min: 12  Max: 20 12   SpO2  Min: 96 %  Max: 100 % 100 %       Physical Exam:  GENERAL: awake, alert, oriented and in no acute distress, non-toxic appearing. Currently without complaint.  HEENT: normocephalic atraumatic   NECK: supple   LUNGS: Clear bilaterally, no wheezing or rales, no accessory muscle use   CVS: Regular rate and rhythm, normal peripheral perfusion  ABD: Soft, non-tender, non-distended, bowel sounds present  EXTREMITIES: no clubbing or cyanosis  SKIN: Warm, dry.   NEURO: alert and oriented, grossly without focal deficits   PSYCHIATRIC: Cooperative    Procedures Performed: No admission procedures for hospital encounter.     Significant Diagnostic Studies: See Full reports for all details    Recent Labs   Lab 02/19/23  1458   WBC 14.7*   RBC 2.05*   HGB 6.0*   HCT 17.3*   MCV 84.4   MCH 29.3   MCHC 34.7   RDW 30.0*   *   MPV 10.2       Recent Labs   Lab 02/19/23  1458      K 3.3*   CO2 24   BUN 4.0*   CREATININE 0.56*   CALCIUM 8.5   ALBUMIN 3.4*   ALKPHOS 91   ALT 11   AST 15   BILITOT 4.2*        Microbiology Results (last 7 days)       ** No results found for the last 168 hours. **             X-Ray Shoulder Complete 2 View  Right  Narrative: EXAMINATION:  XR SHOULDER COMPLETE 2 OR MORE VIEWS RIGHT    CLINICAL HISTORY:  Pain in unspecified shoulder    TECHNIQUE:  Three views of the right shoulder.    COMPARISON:  No prior imaging available for comparison    FINDINGS:  No displaced fracture.  The glenohumeral articulation is congruent.  The visualized lungs are unremarkable.  Impression: No acute osseous abnormality, fracture, or dislocation.    There is no significant degenerative change.    Electronically signed by: Yandel Key  Date:    02/19/2023  Time:    15:01         Medication List        ASK your doctor about these medications      amoxicillin 500 MG capsule  Commonly known as: AMOXIL     ATORVASTATIN ORAL     cefdinir 300 MG capsule  Commonly known as: OMNICEF     ezetimibe 10 mg tablet  Commonly known as: ZETIA     OXBRYTA 500 mg Tab  Generic drug: voxelotor     OXcarbazepine 150 MG Tab  Commonly known as: TRILEPTAL     promethazine-dextromethorphan 6.25-15 mg/5 mL Syrp  Commonly known as: PROMETHAZINE-DM     valACYclovir 1000 MG tablet  Commonly known as: VALTREX               Explained in detail to the patient about the discharge plan, medications, and follow-up visits. Pt understands and agrees with the treatment plan  Discharge Disposition: Home or Self Care   Discharged Condition: stable  Diet-    Medications Per DC med rec  Activities as tolerated    For further questions contact hospitalist office    Discharge time 33 minutes    For worsening symptoms, chest pain, shortness of breath, increased abdominal pain, high grade fever, stroke or stroke like symptoms, immediately go to the nearest Emergency Room or call 911 as soon as possible.      Jef Alonzo M.D, on 2/20/2023. at 5:38 AM.

## 2023-02-22 ENCOUNTER — LAB VISIT (OUTPATIENT)
Dept: LAB | Facility: HOSPITAL | Age: 68
End: 2023-02-22
Attending: INTERNAL MEDICINE
Payer: MEDICARE

## 2023-02-22 ENCOUNTER — PATIENT MESSAGE (OUTPATIENT)
Dept: TRANSPLANT | Facility: CLINIC | Age: 68
End: 2023-02-22
Payer: MEDICARE

## 2023-02-22 DIAGNOSIS — Z94.4 S/P LIVER TRANSPLANT: ICD-10-CM

## 2023-02-22 LAB
ALBUMIN SERPL BCP-MCNC: 4 G/DL (ref 3.5–5.2)
ALP SERPL-CCNC: 66 U/L (ref 55–135)
ALT SERPL W/O P-5'-P-CCNC: 21 U/L (ref 10–44)
ANION GAP SERPL CALC-SCNC: 12 MMOL/L (ref 8–16)
AST SERPL-CCNC: 26 U/L (ref 10–40)
BASOPHILS # BLD AUTO: 0.08 K/UL (ref 0–0.2)
BASOPHILS NFR BLD: 1 % (ref 0–1.9)
BILIRUB SERPL-MCNC: 0.7 MG/DL (ref 0.1–1)
BUN SERPL-MCNC: 17 MG/DL (ref 8–23)
CALCIUM SERPL-MCNC: 9.3 MG/DL (ref 8.7–10.5)
CHLORIDE SERPL-SCNC: 101 MMOL/L (ref 95–110)
CO2 SERPL-SCNC: 27 MMOL/L (ref 23–29)
CREAT SERPL-MCNC: 1.1 MG/DL (ref 0.5–1.4)
DIFFERENTIAL METHOD: ABNORMAL
EOSINOPHIL # BLD AUTO: 0.3 K/UL (ref 0–0.5)
EOSINOPHIL NFR BLD: 4.2 % (ref 0–8)
ERYTHROCYTE [DISTWIDTH] IN BLOOD BY AUTOMATED COUNT: 18.1 % (ref 11.5–14.5)
EST. GFR  (NO RACE VARIABLE): >60 ML/MIN/1.73 M^2
GLUCOSE SERPL-MCNC: 121 MG/DL (ref 70–110)
HCT VFR BLD AUTO: 38.6 % (ref 40–54)
HGB BLD-MCNC: 10.6 G/DL (ref 14–18)
IMM GRANULOCYTES # BLD AUTO: 0.03 K/UL (ref 0–0.04)
IMM GRANULOCYTES NFR BLD AUTO: 0.4 % (ref 0–0.5)
LYMPHOCYTES # BLD AUTO: 1.7 K/UL (ref 1–4.8)
LYMPHOCYTES NFR BLD: 22.1 % (ref 18–48)
MCH RBC QN AUTO: 19.9 PG (ref 27–31)
MCHC RBC AUTO-ENTMCNC: 27.5 G/DL (ref 32–36)
MCV RBC AUTO: 73 FL (ref 82–98)
MONOCYTES # BLD AUTO: 0.8 K/UL (ref 0.3–1)
MONOCYTES NFR BLD: 10.1 % (ref 4–15)
NEUTROPHILS # BLD AUTO: 4.9 K/UL (ref 1.8–7.7)
NEUTROPHILS NFR BLD: 62.2 % (ref 38–73)
NRBC BLD-RTO: 0 /100 WBC
PLATELET # BLD AUTO: 147 K/UL (ref 150–450)
PMV BLD AUTO: 11.2 FL (ref 9.2–12.9)
POTASSIUM SERPL-SCNC: 4.1 MMOL/L (ref 3.5–5.1)
PROT SERPL-MCNC: 7.4 G/DL (ref 6–8.4)
RBC # BLD AUTO: 5.32 M/UL (ref 4.6–6.2)
SODIUM SERPL-SCNC: 140 MMOL/L (ref 136–145)
WBC # BLD AUTO: 7.86 K/UL (ref 3.9–12.7)

## 2023-02-22 PROCEDURE — 85025 COMPLETE CBC W/AUTO DIFF WBC: CPT | Performed by: INTERNAL MEDICINE

## 2023-02-22 PROCEDURE — 80197 ASSAY OF TACROLIMUS: CPT | Performed by: INTERNAL MEDICINE

## 2023-02-22 PROCEDURE — 36415 COLL VENOUS BLD VENIPUNCTURE: CPT | Mod: PN | Performed by: INTERNAL MEDICINE

## 2023-02-22 PROCEDURE — 80053 COMPREHEN METABOLIC PANEL: CPT | Performed by: INTERNAL MEDICINE

## 2023-02-23 ENCOUNTER — PATIENT MESSAGE (OUTPATIENT)
Dept: TRANSPLANT | Facility: CLINIC | Age: 68
End: 2023-02-23
Payer: MEDICARE

## 2023-02-23 DIAGNOSIS — Z94.4 S/P LIVER TRANSPLANT: ICD-10-CM

## 2023-02-23 LAB — TACROLIMUS BLD-MCNC: 2.2 NG/ML (ref 5–15)

## 2023-02-23 RX ORDER — TACROLIMUS 0.5 MG/1
0.5 CAPSULE ORAL EVERY 12 HOURS
Qty: 60 CAPSULE | Refills: 11 | Status: SHIPPED | OUTPATIENT
Start: 2023-02-23 | End: 2023-09-07

## 2023-02-23 NOTE — TELEPHONE ENCOUNTER
Sent patient message with change and to labs on 3/20/23  ----- Message from Thais Maxwell MD sent at 2/23/2023  3:02 PM CST -----  The Labs are stable; increase prograf to 0.5/0.5 from 0.5 mg daily and repaeat labs in  4 weeks- please let patient know.

## 2023-02-28 DIAGNOSIS — Z94.4 S/P LIVER TRANSPLANT: ICD-10-CM

## 2023-02-28 DIAGNOSIS — R18.8 OTHER ASCITES: ICD-10-CM

## 2023-02-28 RX ORDER — FUROSEMIDE 40 MG/1
20 TABLET ORAL DAILY
Qty: 15 TABLET | Refills: 11 | Status: CANCELLED | OUTPATIENT
Start: 2023-02-28 | End: 2024-02-28

## 2023-03-01 ENCOUNTER — HOSPITAL ENCOUNTER (OUTPATIENT)
Dept: RADIOLOGY | Facility: HOSPITAL | Age: 68
Discharge: HOME | End: 2023-03-01
Attending: INTERNAL MEDICINE
Payer: MEDICARE

## 2023-03-01 DIAGNOSIS — C22.1 CHOLANGIOCARCINOMA: ICD-10-CM

## 2023-03-01 RX ORDER — FUROSEMIDE 20 MG/1
20 TABLET ORAL DAILY
Qty: 30 TABLET | Refills: 2 | Status: SHIPPED | OUTPATIENT
Start: 2023-03-01 | End: 2023-06-12 | Stop reason: SDUPTHER

## 2023-03-07 ENCOUNTER — HOSPITAL ENCOUNTER (OUTPATIENT)
Dept: RADIOLOGY | Facility: HOSPITAL | Age: 68
Discharge: HOME | End: 2023-03-07
Attending: INTERNAL MEDICINE
Payer: MEDICARE

## 2023-03-07 DIAGNOSIS — I48.19 PERSISTENT ATRIAL FIBRILLATION: ICD-10-CM

## 2023-03-07 DIAGNOSIS — C22.1 CHOLANGIOCARCINOMA: ICD-10-CM

## 2023-03-07 PROCEDURE — 74160 CT ABDOMEN W/CONTRAST: CPT | Mod: TC

## 2023-03-07 PROCEDURE — 74160 CT ABDOMEN WITH CONTRAST: ICD-10-PCS | Mod: 26,,, | Performed by: STUDENT IN AN ORGANIZED HEALTH CARE EDUCATION/TRAINING PROGRAM

## 2023-03-07 PROCEDURE — 71260 CT THORAX DX C+: CPT | Mod: TC

## 2023-03-07 PROCEDURE — 71260 CT CHEST WITH CONTRAST: ICD-10-PCS | Mod: 26,,, | Performed by: STUDENT IN AN ORGANIZED HEALTH CARE EDUCATION/TRAINING PROGRAM

## 2023-03-07 PROCEDURE — 71260 CT THORAX DX C+: CPT | Mod: 26,,, | Performed by: STUDENT IN AN ORGANIZED HEALTH CARE EDUCATION/TRAINING PROGRAM

## 2023-03-07 PROCEDURE — 74160 CT ABDOMEN W/CONTRAST: CPT | Mod: 26,,, | Performed by: STUDENT IN AN ORGANIZED HEALTH CARE EDUCATION/TRAINING PROGRAM

## 2023-03-07 PROCEDURE — 25500020 PHARM REV CODE 255: Performed by: INTERNAL MEDICINE

## 2023-03-07 RX ORDER — AMIODARONE HYDROCHLORIDE 200 MG/1
200 TABLET ORAL DAILY
Qty: 30 TABLET | Refills: 11 | Status: CANCELLED | OUTPATIENT
Start: 2023-03-07

## 2023-03-07 RX ORDER — INSULIN LISPRO 100 [IU]/ML
6 INJECTION, SOLUTION INTRAVENOUS; SUBCUTANEOUS 3 TIMES DAILY
Qty: 15 ML | Refills: 4 | Status: CANCELLED | OUTPATIENT
Start: 2023-03-07 | End: 2024-03-06

## 2023-03-07 RX ADMIN — IOHEXOL 100 ML: 350 INJECTION, SOLUTION INTRAVENOUS at 04:03

## 2023-03-08 ENCOUNTER — PATIENT MESSAGE (OUTPATIENT)
Dept: TRANSPLANT | Facility: CLINIC | Age: 68
End: 2023-03-08
Payer: MEDICARE

## 2023-03-08 ENCOUNTER — TELEPHONE (OUTPATIENT)
Dept: TRANSPLANT | Facility: CLINIC | Age: 68
End: 2023-03-08
Payer: MEDICARE

## 2023-03-08 DIAGNOSIS — Z79.4 TYPE 2 DIABETES MELLITUS WITH HYPERGLYCEMIA, WITH LONG-TERM CURRENT USE OF INSULIN: Primary | ICD-10-CM

## 2023-03-08 DIAGNOSIS — E11.65 TYPE 2 DIABETES MELLITUS WITH HYPERGLYCEMIA, WITH LONG-TERM CURRENT USE OF INSULIN: Primary | ICD-10-CM

## 2023-03-08 DIAGNOSIS — I48.19 PERSISTENT ATRIAL FIBRILLATION: ICD-10-CM

## 2023-03-08 RX ORDER — INSULIN LISPRO 100 [IU]/ML
6 INJECTION, SOLUTION INTRAVENOUS; SUBCUTANEOUS
Qty: 15 ML | Refills: 11 | Status: SHIPPED | OUTPATIENT
Start: 2023-03-08 | End: 2024-02-14 | Stop reason: SDUPTHER

## 2023-03-08 RX ORDER — AMIODARONE HYDROCHLORIDE 200 MG/1
200 TABLET ORAL DAILY
Qty: 30 TABLET | Refills: 11 | Status: ON HOLD | OUTPATIENT
Start: 2023-03-08 | End: 2024-01-02 | Stop reason: HOSPADM

## 2023-03-08 NOTE — TELEPHONE ENCOUNTER
LV 1/23/23. LL 9/19/22.    Patient is requesting a refill on Lispro to be sent to [facility].  Dr Zamarripa prescribe it when he got his liver transplant but now he is under the care of endocrine.

## 2023-03-08 NOTE — TELEPHONE ENCOUNTER
----- Message from Thais Maxwell MD sent at 3/8/2023 12:29 PM CST -----  No recurrence of malignancy - please let patient know.

## 2023-03-14 ENCOUNTER — APPOINTMENT (OUTPATIENT)
Dept: RADIOLOGY | Facility: CLINIC | Age: 68
End: 2023-03-14
Attending: INTERNAL MEDICINE
Payer: MEDICARE

## 2023-03-14 DIAGNOSIS — Z94.4 S/P LIVER TRANSPLANT: ICD-10-CM

## 2023-03-14 DIAGNOSIS — Z79.818 LONG TERM (CURRENT) USE OF OTHER AGENTS AFFECTING ESTROGEN RECEPTORS AND ESTROGEN LEVELS: ICD-10-CM

## 2023-03-14 PROCEDURE — 77080 DXA BONE DENSITY AXIAL: CPT | Mod: 26,,, | Performed by: INTERNAL MEDICINE

## 2023-03-14 PROCEDURE — 77080 DEXA BONE DENSITY SPINE HIP: ICD-10-PCS | Mod: 26,,, | Performed by: INTERNAL MEDICINE

## 2023-03-14 PROCEDURE — 77080 DXA BONE DENSITY AXIAL: CPT | Mod: TC,PO

## 2023-03-20 ENCOUNTER — LAB VISIT (OUTPATIENT)
Dept: LAB | Facility: HOSPITAL | Age: 68
End: 2023-03-20
Attending: INTERNAL MEDICINE
Payer: MEDICARE

## 2023-03-20 DIAGNOSIS — Z94.4 S/P LIVER TRANSPLANT: ICD-10-CM

## 2023-03-20 LAB
ALBUMIN SERPL BCP-MCNC: 4 G/DL (ref 3.5–5.2)
ALP SERPL-CCNC: 73 U/L (ref 55–135)
ALT SERPL W/O P-5'-P-CCNC: 19 U/L (ref 10–44)
ANION GAP SERPL CALC-SCNC: 12 MMOL/L (ref 8–16)
AST SERPL-CCNC: 24 U/L (ref 10–40)
BASOPHILS # BLD AUTO: 0.09 K/UL (ref 0–0.2)
BASOPHILS NFR BLD: 1.2 % (ref 0–1.9)
BILIRUB SERPL-MCNC: 0.7 MG/DL (ref 0.1–1)
BUN SERPL-MCNC: 22 MG/DL (ref 8–23)
CALCIUM SERPL-MCNC: 9.5 MG/DL (ref 8.7–10.5)
CHLORIDE SERPL-SCNC: 103 MMOL/L (ref 95–110)
CO2 SERPL-SCNC: 27 MMOL/L (ref 23–29)
CREAT SERPL-MCNC: 1.2 MG/DL (ref 0.5–1.4)
DIFFERENTIAL METHOD: ABNORMAL
EOSINOPHIL # BLD AUTO: 0.2 K/UL (ref 0–0.5)
EOSINOPHIL NFR BLD: 2.7 % (ref 0–8)
ERYTHROCYTE [DISTWIDTH] IN BLOOD BY AUTOMATED COUNT: 18.6 % (ref 11.5–14.5)
EST. GFR  (NO RACE VARIABLE): >60 ML/MIN/1.73 M^2
GLUCOSE SERPL-MCNC: 131 MG/DL (ref 70–110)
HCT VFR BLD AUTO: 39.4 % (ref 40–54)
HGB BLD-MCNC: 10.9 G/DL (ref 14–18)
IMM GRANULOCYTES # BLD AUTO: 0.03 K/UL (ref 0–0.04)
IMM GRANULOCYTES NFR BLD AUTO: 0.4 % (ref 0–0.5)
LYMPHOCYTES # BLD AUTO: 1.5 K/UL (ref 1–4.8)
LYMPHOCYTES NFR BLD: 18.7 % (ref 18–48)
MCH RBC QN AUTO: 20.2 PG (ref 27–31)
MCHC RBC AUTO-ENTMCNC: 27.7 G/DL (ref 32–36)
MCV RBC AUTO: 73 FL (ref 82–98)
MONOCYTES # BLD AUTO: 0.8 K/UL (ref 0.3–1)
MONOCYTES NFR BLD: 9.8 % (ref 4–15)
NEUTROPHILS # BLD AUTO: 5.2 K/UL (ref 1.8–7.7)
NEUTROPHILS NFR BLD: 67.2 % (ref 38–73)
NRBC BLD-RTO: 0 /100 WBC
PLATELET # BLD AUTO: 155 K/UL (ref 150–450)
PMV BLD AUTO: ABNORMAL FL (ref 9.2–12.9)
POTASSIUM SERPL-SCNC: 4.1 MMOL/L (ref 3.5–5.1)
PROT SERPL-MCNC: 7.3 G/DL (ref 6–8.4)
RBC # BLD AUTO: 5.4 M/UL (ref 4.6–6.2)
SODIUM SERPL-SCNC: 142 MMOL/L (ref 136–145)
WBC # BLD AUTO: 7.75 K/UL (ref 3.9–12.7)

## 2023-03-20 PROCEDURE — 80197 ASSAY OF TACROLIMUS: CPT | Performed by: INTERNAL MEDICINE

## 2023-03-20 PROCEDURE — 80053 COMPREHEN METABOLIC PANEL: CPT | Performed by: INTERNAL MEDICINE

## 2023-03-20 PROCEDURE — 85025 COMPLETE CBC W/AUTO DIFF WBC: CPT | Performed by: INTERNAL MEDICINE

## 2023-03-20 PROCEDURE — 36415 COLL VENOUS BLD VENIPUNCTURE: CPT | Mod: PN | Performed by: INTERNAL MEDICINE

## 2023-03-21 LAB — TACROLIMUS BLD-MCNC: 7 NG/ML (ref 5–15)

## 2023-03-27 DIAGNOSIS — Z94.4 S/P LIVER TRANSPLANT: ICD-10-CM

## 2023-03-27 RX ORDER — ZOLPIDEM TARTRATE 5 MG/1
5 TABLET ORAL NIGHTLY PRN
Qty: 30 TABLET | Refills: 0 | Status: CANCELLED | OUTPATIENT
Start: 2023-03-27 | End: 2023-09-25

## 2023-03-28 ENCOUNTER — PATIENT MESSAGE (OUTPATIENT)
Dept: TRANSPLANT | Facility: CLINIC | Age: 68
End: 2023-03-28
Payer: MEDICARE

## 2023-03-28 ENCOUNTER — TELEPHONE (OUTPATIENT)
Dept: TRANSPLANT | Facility: CLINIC | Age: 68
End: 2023-03-28
Payer: MEDICARE

## 2023-03-28 NOTE — TELEPHONE ENCOUNTER
Sent patient message via portal to let him know labs are stable.  No medication changes, next labs due on 5/08/23    ----- Message from Thais Maxwell MD sent at 3/27/2023  9:45 PM CDT -----  The Labs are stable - please let patient know.

## 2023-03-28 NOTE — TELEPHONE ENCOUNTER
----- Message from Thais Maxwell MD sent at 3/27/2023  9:40 PM CDT -----  Osteopenia on bone density  Daily calcium intake 9723-8920 mg, dietary sources preferred; Vitamin D 6264-9510 IU daily.  Weight bearing exercise and fall precautions.  Repeat BMD in 2 years.

## 2023-03-29 RX ORDER — ONDANSETRON 4 MG/1
4 TABLET, ORALLY DISINTEGRATING ORAL EVERY 8 HOURS PRN
Qty: 30 TABLET | Refills: 5 | Status: ON HOLD | OUTPATIENT
Start: 2023-03-29 | End: 2024-01-30 | Stop reason: CLARIF

## 2023-03-29 RX ORDER — ZOLPIDEM TARTRATE 5 MG/1
5 TABLET ORAL NIGHTLY PRN
Qty: 30 TABLET | Refills: 1 | OUTPATIENT
Start: 2023-03-29 | End: 2023-09-27

## 2023-04-28 ENCOUNTER — TELEPHONE (OUTPATIENT)
Dept: TRANSPLANT | Facility: CLINIC | Age: 68
End: 2023-04-28
Payer: MEDICARE

## 2023-05-02 ENCOUNTER — PATIENT MESSAGE (OUTPATIENT)
Dept: TRANSPLANT | Facility: CLINIC | Age: 68
End: 2023-05-02
Payer: MEDICARE

## 2023-05-03 ENCOUNTER — TELEPHONE (OUTPATIENT)
Dept: TRANSPLANT | Facility: CLINIC | Age: 68
End: 2023-05-03
Payer: MEDICARE

## 2023-05-03 DIAGNOSIS — R11.0 NAUSEA: Primary | ICD-10-CM

## 2023-05-05 ENCOUNTER — PATIENT MESSAGE (OUTPATIENT)
Dept: TRANSPLANT | Facility: CLINIC | Age: 68
End: 2023-05-05
Payer: MEDICARE

## 2023-05-05 ENCOUNTER — TELEPHONE (OUTPATIENT)
Dept: ENDOSCOPY | Facility: HOSPITAL | Age: 68
End: 2023-05-05
Payer: MEDICARE

## 2023-05-06 NOTE — TELEPHONE ENCOUNTER
----- Message from Ronel Cast MA sent at 5/5/2023 11:54 AM CDT -----  Regarding: Urgent Appointment needed.  Good Afternoon,  An Urgent appointment is needed for the patient attached above for Nausea/ Loss of appetite. Please assist patient with scheduling this matter. Patient is being referred by Dr. Maxwell. Thanks for all your help.

## 2023-05-08 ENCOUNTER — LAB VISIT (OUTPATIENT)
Dept: LAB | Facility: HOSPITAL | Age: 68
End: 2023-05-08
Attending: INTERNAL MEDICINE
Payer: MEDICARE

## 2023-05-08 DIAGNOSIS — Z94.4 S/P LIVER TRANSPLANT: ICD-10-CM

## 2023-05-08 DIAGNOSIS — C22.1 CHOLANGIOCARCINOMA: ICD-10-CM

## 2023-05-08 DIAGNOSIS — R97.0 ELEVATED CARCINOEMBRYONIC ANTIGEN (CEA): ICD-10-CM

## 2023-05-08 LAB
ALBUMIN SERPL BCP-MCNC: 3.7 G/DL (ref 3.5–5.2)
ALP SERPL-CCNC: 79 U/L (ref 55–135)
ALT SERPL W/O P-5'-P-CCNC: 21 U/L (ref 10–44)
ANION GAP SERPL CALC-SCNC: 9 MMOL/L (ref 8–16)
AST SERPL-CCNC: 26 U/L (ref 10–40)
BASOPHILS # BLD AUTO: 0.05 K/UL (ref 0–0.2)
BASOPHILS NFR BLD: 0.8 % (ref 0–1.9)
BILIRUB SERPL-MCNC: 0.3 MG/DL (ref 0.1–1)
BUN SERPL-MCNC: 15 MG/DL (ref 8–23)
CALCIUM SERPL-MCNC: 9.1 MG/DL (ref 8.7–10.5)
CANCER AG19-9 SERPL-ACNC: <2.1 U/ML (ref 0–40)
CEA SERPL-MCNC: <1.7 NG/ML (ref 0–5)
CHLORIDE SERPL-SCNC: 104 MMOL/L (ref 95–110)
CO2 SERPL-SCNC: 29 MMOL/L (ref 23–29)
CREAT SERPL-MCNC: 0.9 MG/DL (ref 0.5–1.4)
DIFFERENTIAL METHOD: ABNORMAL
EOSINOPHIL # BLD AUTO: 0.2 K/UL (ref 0–0.5)
EOSINOPHIL NFR BLD: 2.6 % (ref 0–8)
ERYTHROCYTE [DISTWIDTH] IN BLOOD BY AUTOMATED COUNT: 19.6 % (ref 11.5–14.5)
EST. GFR  (NO RACE VARIABLE): >60 ML/MIN/1.73 M^2
GLUCOSE SERPL-MCNC: 146 MG/DL (ref 70–110)
HCT VFR BLD AUTO: 39.6 % (ref 40–54)
HGB BLD-MCNC: 10.7 G/DL (ref 14–18)
IMM GRANULOCYTES # BLD AUTO: 0.03 K/UL (ref 0–0.04)
IMM GRANULOCYTES NFR BLD AUTO: 0.5 % (ref 0–0.5)
LYMPHOCYTES # BLD AUTO: 1.4 K/UL (ref 1–4.8)
LYMPHOCYTES NFR BLD: 23 % (ref 18–48)
MCH RBC QN AUTO: 20.3 PG (ref 27–31)
MCHC RBC AUTO-ENTMCNC: 27 G/DL (ref 32–36)
MCV RBC AUTO: 75 FL (ref 82–98)
MONOCYTES # BLD AUTO: 0.7 K/UL (ref 0.3–1)
MONOCYTES NFR BLD: 11.4 % (ref 4–15)
NEUTROPHILS # BLD AUTO: 3.9 K/UL (ref 1.8–7.7)
NEUTROPHILS NFR BLD: 61.7 % (ref 38–73)
NRBC BLD-RTO: 0 /100 WBC
PLATELET # BLD AUTO: 144 K/UL (ref 150–450)
PMV BLD AUTO: 11.4 FL (ref 9.2–12.9)
POTASSIUM SERPL-SCNC: 4.4 MMOL/L (ref 3.5–5.1)
PROT SERPL-MCNC: 6.8 G/DL (ref 6–8.4)
RBC # BLD AUTO: 5.26 M/UL (ref 4.6–6.2)
SODIUM SERPL-SCNC: 142 MMOL/L (ref 136–145)
WBC # BLD AUTO: 6.25 K/UL (ref 3.9–12.7)

## 2023-05-08 PROCEDURE — 36415 COLL VENOUS BLD VENIPUNCTURE: CPT | Mod: PN | Performed by: INTERNAL MEDICINE

## 2023-05-08 PROCEDURE — 85025 COMPLETE CBC W/AUTO DIFF WBC: CPT | Performed by: INTERNAL MEDICINE

## 2023-05-08 PROCEDURE — 86301 IMMUNOASSAY TUMOR CA 19-9: CPT | Performed by: INTERNAL MEDICINE

## 2023-05-08 PROCEDURE — 80197 ASSAY OF TACROLIMUS: CPT | Performed by: INTERNAL MEDICINE

## 2023-05-08 PROCEDURE — 82378 CARCINOEMBRYONIC ANTIGEN: CPT | Performed by: INTERNAL MEDICINE

## 2023-05-08 PROCEDURE — 80053 COMPREHEN METABOLIC PANEL: CPT | Performed by: INTERNAL MEDICINE

## 2023-05-09 ENCOUNTER — PATIENT MESSAGE (OUTPATIENT)
Dept: TRANSPLANT | Facility: CLINIC | Age: 68
End: 2023-05-09
Payer: MEDICARE

## 2023-05-09 ENCOUNTER — TELEPHONE (OUTPATIENT)
Dept: TRANSPLANT | Facility: CLINIC | Age: 68
End: 2023-05-09
Payer: MEDICARE

## 2023-05-09 LAB — TACROLIMUS BLD-MCNC: 4.7 NG/ML (ref 5–15)

## 2023-05-09 NOTE — TELEPHONE ENCOUNTER
Sent patient message via portal to let him know labs are stable.  No medication changes, next labs due on 6/19/23      ----- Message from Thais Maxwell MD sent at 5/9/2023 11:57 AM CDT -----  The Labs are stable - please let patient know.

## 2023-05-25 ENCOUNTER — TELEPHONE (OUTPATIENT)
Dept: GASTROENTEROLOGY | Facility: CLINIC | Age: 68
End: 2023-05-25
Payer: MEDICARE

## 2023-05-25 NOTE — TELEPHONE ENCOUNTER
Ma called and talked to patient about rescheduling his apt that pt missed today   Ma stated to patient that nothing is open at this moment and will put patient on waiting list   Patient verbalize understanding

## 2023-05-25 NOTE — TELEPHONE ENCOUNTER
----- Message from Heidy Carver MA sent at 5/25/2023  1:31 PM CDT -----  Regarding: FW: Reschedule Needed  Contact: @[phone]    ----- Message -----  From: Osmar Clarke  Sent: 5/25/2023  11:52 AM CDT  To: [facility] Staff  Subject: Reschedule Needed                                Pt requesting a call back to reschedule his appt today at 1:30pm due to a conflict.  I attempted to reschedule with no available date. Please call to discuss further.

## 2023-06-01 ENCOUNTER — PATIENT MESSAGE (OUTPATIENT)
Dept: TRANSPLANT | Facility: CLINIC | Age: 68
End: 2023-06-01
Payer: MEDICARE

## 2023-06-01 ENCOUNTER — HOSPITAL ENCOUNTER (INPATIENT)
Facility: HOSPITAL | Age: 68
LOS: 6 days | Discharge: HOME HEALTH | DRG: 338 | End: 2023-06-08
Attending: EMERGENCY MEDICINE | Admitting: SURGERY
Payer: MEDICARE

## 2023-06-01 DIAGNOSIS — K35.80 ACUTE APPENDICITIS, UNSPECIFIED ACUTE APPENDICITIS TYPE: Primary | ICD-10-CM

## 2023-06-01 DIAGNOSIS — R10.9 ABDOMINAL PAIN: ICD-10-CM

## 2023-06-01 LAB
ALBUMIN SERPL BCP-MCNC: 3.9 G/DL (ref 3.5–5.2)
ALP SERPL-CCNC: 76 U/L (ref 55–135)
ALT SERPL W/O P-5'-P-CCNC: 26 U/L (ref 10–44)
ANION GAP SERPL CALC-SCNC: 14 MMOL/L (ref 8–16)
ANISOCYTOSIS BLD QL SMEAR: SLIGHT
AST SERPL-CCNC: 23 U/L (ref 10–40)
BASOPHILS # BLD AUTO: 0.06 K/UL (ref 0–0.2)
BASOPHILS NFR BLD: 0.2 % (ref 0–1.9)
BILIRUB SERPL-MCNC: 1.2 MG/DL (ref 0.1–1)
BUN SERPL-MCNC: 17 MG/DL (ref 8–23)
CALCIUM SERPL-MCNC: 9.7 MG/DL (ref 8.7–10.5)
CHLORIDE SERPL-SCNC: 102 MMOL/L (ref 95–110)
CO2 SERPL-SCNC: 22 MMOL/L (ref 23–29)
CREAT SERPL-MCNC: 1.2 MG/DL (ref 0.5–1.4)
DIFFERENTIAL METHOD: ABNORMAL
EOSINOPHIL # BLD AUTO: 0 K/UL (ref 0–0.5)
EOSINOPHIL NFR BLD: 0 % (ref 0–8)
ERYTHROCYTE [DISTWIDTH] IN BLOOD BY AUTOMATED COUNT: 19.5 % (ref 11.5–14.5)
EST. GFR  (NO RACE VARIABLE): >60 ML/MIN/1.73 M^2
GLUCOSE SERPL-MCNC: 176 MG/DL (ref 70–110)
HCT VFR BLD AUTO: 40.6 % (ref 40–54)
HGB BLD-MCNC: 11.5 G/DL (ref 14–18)
IMM GRANULOCYTES # BLD AUTO: 0.27 K/UL (ref 0–0.04)
IMM GRANULOCYTES NFR BLD AUTO: 1 % (ref 0–0.5)
INR PPP: 1.3 (ref 0.8–1.2)
LACTATE SERPL-SCNC: 2.8 MMOL/L (ref 0.5–2.2)
LIPASE SERPL-CCNC: 8 U/L (ref 4–60)
LYMPHOCYTES # BLD AUTO: 0.5 K/UL (ref 1–4.8)
LYMPHOCYTES NFR BLD: 1.9 % (ref 18–48)
MCH RBC QN AUTO: 20.1 PG (ref 27–31)
MCHC RBC AUTO-ENTMCNC: 28.3 G/DL (ref 32–36)
MCV RBC AUTO: 71 FL (ref 82–98)
MONOCYTES # BLD AUTO: 2.6 K/UL (ref 0.3–1)
MONOCYTES NFR BLD: 9.9 % (ref 4–15)
NEUTROPHILS # BLD AUTO: 22.8 K/UL (ref 1.8–7.7)
NEUTROPHILS NFR BLD: 87 % (ref 38–73)
NRBC BLD-RTO: 0 /100 WBC
PLATELET # BLD AUTO: 139 K/UL (ref 150–450)
PLATELET BLD QL SMEAR: ABNORMAL
PMV BLD AUTO: ABNORMAL FL (ref 9.2–12.9)
POIKILOCYTOSIS BLD QL SMEAR: SLIGHT
POLYCHROMASIA BLD QL SMEAR: ABNORMAL
POTASSIUM SERPL-SCNC: 3.9 MMOL/L (ref 3.5–5.1)
PROT SERPL-MCNC: 7.5 G/DL (ref 6–8.4)
PROTHROMBIN TIME: 13.4 SEC (ref 9–12.5)
RBC # BLD AUTO: 5.71 M/UL (ref 4.6–6.2)
SODIUM SERPL-SCNC: 138 MMOL/L (ref 136–145)
WBC # BLD AUTO: 26.19 K/UL (ref 3.9–12.7)

## 2023-06-01 PROCEDURE — 96375 TX/PRO/DX INJ NEW DRUG ADDON: CPT

## 2023-06-01 PROCEDURE — 96365 THER/PROPH/DIAG IV INF INIT: CPT

## 2023-06-01 PROCEDURE — 99285 EMERGENCY DEPT VISIT HI MDM: CPT | Mod: 25

## 2023-06-01 PROCEDURE — 83690 ASSAY OF LIPASE: CPT | Performed by: EMERGENCY MEDICINE

## 2023-06-01 PROCEDURE — 99285 PR EMERGENCY DEPT VISIT,LEVEL V: ICD-10-PCS | Mod: GC,,, | Performed by: EMERGENCY MEDICINE

## 2023-06-01 PROCEDURE — 87040 BLOOD CULTURE FOR BACTERIA: CPT | Mod: 59

## 2023-06-01 PROCEDURE — 83036 HEMOGLOBIN GLYCOSYLATED A1C: CPT | Performed by: EMERGENCY MEDICINE

## 2023-06-01 PROCEDURE — 25000003 PHARM REV CODE 250

## 2023-06-01 PROCEDURE — 80053 COMPREHEN METABOLIC PANEL: CPT | Performed by: EMERGENCY MEDICINE

## 2023-06-01 PROCEDURE — 63600175 PHARM REV CODE 636 W HCPCS

## 2023-06-01 PROCEDURE — 85025 COMPLETE CBC W/AUTO DIFF WBC: CPT | Performed by: EMERGENCY MEDICINE

## 2023-06-01 PROCEDURE — 85610 PROTHROMBIN TIME: CPT | Performed by: EMERGENCY MEDICINE

## 2023-06-01 PROCEDURE — 83605 ASSAY OF LACTIC ACID: CPT

## 2023-06-01 PROCEDURE — 99285 EMERGENCY DEPT VISIT HI MDM: CPT | Mod: GC,,, | Performed by: EMERGENCY MEDICINE

## 2023-06-01 RX ORDER — ONDANSETRON 2 MG/ML
8 INJECTION INTRAMUSCULAR; INTRAVENOUS
Status: COMPLETED | OUTPATIENT
Start: 2023-06-01 | End: 2023-06-01

## 2023-06-01 RX ORDER — MORPHINE SULFATE 4 MG/ML
4 INJECTION, SOLUTION INTRAMUSCULAR; INTRAVENOUS
Status: COMPLETED | OUTPATIENT
Start: 2023-06-01 | End: 2023-06-01

## 2023-06-01 RX ORDER — MORPHINE SULFATE 4 MG/ML
INJECTION, SOLUTION INTRAMUSCULAR; INTRAVENOUS
Status: COMPLETED
Start: 2023-06-01 | End: 2023-06-01

## 2023-06-01 RX ADMIN — PIPERACILLIN AND TAZOBACTAM 4.5 G: 4; .5 INJECTION, POWDER, LYOPHILIZED, FOR SOLUTION INTRAVENOUS; PARENTERAL at 11:06

## 2023-06-01 RX ADMIN — MORPHINE SULFATE 4 MG: 4 INJECTION, SOLUTION INTRAMUSCULAR; INTRAVENOUS at 10:06

## 2023-06-01 RX ADMIN — MORPHINE SULFATE 4 MG: 4 INJECTION INTRAVENOUS at 10:06

## 2023-06-01 RX ADMIN — SODIUM CHLORIDE 1000 ML: 0.9 INJECTION, SOLUTION INTRAVENOUS at 10:06

## 2023-06-01 RX ADMIN — ONDANSETRON 8 MG: 2 INJECTION INTRAMUSCULAR; INTRAVENOUS at 10:06

## 2023-06-02 ENCOUNTER — ANESTHESIA (OUTPATIENT)
Dept: SURGERY | Facility: HOSPITAL | Age: 68
DRG: 338 | End: 2023-06-02
Payer: MEDICARE

## 2023-06-02 ENCOUNTER — TELEPHONE (OUTPATIENT)
Dept: TRANSPLANT | Facility: CLINIC | Age: 68
End: 2023-06-02
Payer: MEDICARE

## 2023-06-02 ENCOUNTER — ANESTHESIA EVENT (OUTPATIENT)
Dept: SURGERY | Facility: HOSPITAL | Age: 68
DRG: 338 | End: 2023-06-02
Payer: MEDICARE

## 2023-06-02 PROBLEM — K35.80 ACUTE APPENDICITIS: Status: ACTIVE | Noted: 2023-06-02

## 2023-06-02 LAB
ALLENS TEST: ABNORMAL
BILIRUB UR QL STRIP: NEGATIVE
CLARITY UR REFRACT.AUTO: CLEAR
COLOR UR AUTO: YELLOW
ESTIMATED AVG GLUCOSE: 131 MG/DL (ref 68–131)
GLUCOSE UR QL STRIP: ABNORMAL
HBA1C MFR BLD: 6.2 % (ref 4–5.6)
HGB UR QL STRIP: NEGATIVE
KETONES UR QL STRIP: NEGATIVE
LACTATE SERPL-SCNC: 3.3 MMOL/L (ref 0.5–2.2)
LDH SERPL L TO P-CCNC: 2.8 MMOL/L (ref 0.5–2.2)
LEUKOCYTE ESTERASE UR QL STRIP: NEGATIVE
NITRITE UR QL STRIP: NEGATIVE
PH UR STRIP: 6 [PH] (ref 5–8)
POCT GLUCOSE: 134 MG/DL (ref 70–110)
POCT GLUCOSE: 139 MG/DL (ref 70–110)
POCT GLUCOSE: 203 MG/DL (ref 70–110)
PROT UR QL STRIP: ABNORMAL
SAMPLE: ABNORMAL
SITE: ABNORMAL
SP GR UR STRIP: 1.03 (ref 1–1.03)
URN SPEC COLLECT METH UR: ABNORMAL

## 2023-06-02 PROCEDURE — 63600175 PHARM REV CODE 636 W HCPCS: Performed by: STUDENT IN AN ORGANIZED HEALTH CARE EDUCATION/TRAINING PROGRAM

## 2023-06-02 PROCEDURE — 25000003 PHARM REV CODE 250: Performed by: SURGERY

## 2023-06-02 PROCEDURE — 82962 GLUCOSE BLOOD TEST: CPT | Performed by: SURGERY

## 2023-06-02 PROCEDURE — D9220A PRA ANESTHESIA: Mod: CRNA,,, | Performed by: NURSE ANESTHETIST, CERTIFIED REGISTERED

## 2023-06-02 PROCEDURE — 99223 PR INITIAL HOSPITAL CARE,LEVL III: ICD-10-PCS | Mod: 57,AI,, | Performed by: SURGERY

## 2023-06-02 PROCEDURE — 71000015 HC POSTOP RECOV 1ST HR: Performed by: SURGERY

## 2023-06-02 PROCEDURE — 37000009 HC ANESTHESIA EA ADD 15 MINS: Performed by: SURGERY

## 2023-06-02 PROCEDURE — 36000709 HC OR TIME LEV III EA ADD 15 MIN: Performed by: SURGERY

## 2023-06-02 PROCEDURE — 83605 ASSAY OF LACTIC ACID: CPT

## 2023-06-02 PROCEDURE — D9220A PRA ANESTHESIA: ICD-10-PCS | Mod: ANES,,, | Performed by: ANESTHESIOLOGY

## 2023-06-02 PROCEDURE — 63600175 PHARM REV CODE 636 W HCPCS

## 2023-06-02 PROCEDURE — 27201423 OPTIME MED/SURG SUP & DEVICES STERILE SUPPLY: Performed by: SURGERY

## 2023-06-02 PROCEDURE — 81003 URINALYSIS AUTO W/O SCOPE: CPT | Performed by: EMERGENCY MEDICINE

## 2023-06-02 PROCEDURE — 99900035 HC TECH TIME PER 15 MIN (STAT)

## 2023-06-02 PROCEDURE — 36000708 HC OR TIME LEV III 1ST 15 MIN: Performed by: SURGERY

## 2023-06-02 PROCEDURE — 71000016 HC POSTOP RECOV ADDL HR: Performed by: SURGERY

## 2023-06-02 PROCEDURE — 88304 PR  SURG PATH,LEVEL III: ICD-10-PCS | Mod: 26,,, | Performed by: PATHOLOGY

## 2023-06-02 PROCEDURE — 25000003 PHARM REV CODE 250: Performed by: STUDENT IN AN ORGANIZED HEALTH CARE EDUCATION/TRAINING PROGRAM

## 2023-06-02 PROCEDURE — C1729 CATH, DRAINAGE: HCPCS | Performed by: SURGERY

## 2023-06-02 PROCEDURE — 82803 BLOOD GASES ANY COMBINATION: CPT

## 2023-06-02 PROCEDURE — 88304 TISSUE EXAM BY PATHOLOGIST: CPT | Mod: 26,,, | Performed by: PATHOLOGY

## 2023-06-02 PROCEDURE — 83605 ASSAY OF LACTIC ACID: CPT | Performed by: STUDENT IN AN ORGANIZED HEALTH CARE EDUCATION/TRAINING PROGRAM

## 2023-06-02 PROCEDURE — 25000003 PHARM REV CODE 250: Performed by: NURSE ANESTHETIST, CERTIFIED REGISTERED

## 2023-06-02 PROCEDURE — 63600175 PHARM REV CODE 636 W HCPCS: Performed by: NURSE ANESTHETIST, CERTIFIED REGISTERED

## 2023-06-02 PROCEDURE — 25000003 PHARM REV CODE 250

## 2023-06-02 PROCEDURE — 96367 TX/PROPH/DG ADDL SEQ IV INF: CPT

## 2023-06-02 PROCEDURE — 94761 N-INVAS EAR/PLS OXIMETRY MLT: CPT

## 2023-06-02 PROCEDURE — 25500020 PHARM REV CODE 255: Performed by: EMERGENCY MEDICINE

## 2023-06-02 PROCEDURE — 11000001 HC ACUTE MED/SURG PRIVATE ROOM

## 2023-06-02 PROCEDURE — D9220A PRA ANESTHESIA: ICD-10-PCS | Mod: CRNA,,, | Performed by: NURSE ANESTHETIST, CERTIFIED REGISTERED

## 2023-06-02 PROCEDURE — D9220A PRA ANESTHESIA: Mod: ANES,,, | Performed by: ANESTHESIOLOGY

## 2023-06-02 PROCEDURE — 44970 PR LAP,APPENDECTOMY: ICD-10-PCS | Mod: ,,, | Performed by: SURGERY

## 2023-06-02 PROCEDURE — 96376 TX/PRO/DX INJ SAME DRUG ADON: CPT

## 2023-06-02 PROCEDURE — 99223 1ST HOSP IP/OBS HIGH 75: CPT | Mod: 57,AI,, | Performed by: SURGERY

## 2023-06-02 PROCEDURE — 96366 THER/PROPH/DIAG IV INF ADDON: CPT

## 2023-06-02 PROCEDURE — 37000008 HC ANESTHESIA 1ST 15 MINUTES: Performed by: SURGERY

## 2023-06-02 PROCEDURE — 71000033 HC RECOVERY, INTIAL HOUR: Performed by: SURGERY

## 2023-06-02 PROCEDURE — 63600175 PHARM REV CODE 636 W HCPCS: Performed by: EMERGENCY MEDICINE

## 2023-06-02 PROCEDURE — 44970 LAPAROSCOPY APPENDECTOMY: CPT | Mod: ,,, | Performed by: SURGERY

## 2023-06-02 PROCEDURE — 88304 TISSUE EXAM BY PATHOLOGIST: CPT | Performed by: PATHOLOGY

## 2023-06-02 RX ORDER — LIDOCAINE HYDROCHLORIDE 10 MG/ML
1 INJECTION, SOLUTION EPIDURAL; INFILTRATION; INTRACAUDAL; PERINEURAL ONCE AS NEEDED
Status: DISCONTINUED | OUTPATIENT
Start: 2023-06-02 | End: 2023-06-03

## 2023-06-02 RX ORDER — TALC
6 POWDER (GRAM) TOPICAL NIGHTLY PRN
Status: DISCONTINUED | OUTPATIENT
Start: 2023-06-02 | End: 2023-06-03

## 2023-06-02 RX ORDER — ONDANSETRON 2 MG/ML
4 INJECTION INTRAMUSCULAR; INTRAVENOUS EVERY 6 HOURS PRN
Status: DISCONTINUED | OUTPATIENT
Start: 2023-06-02 | End: 2023-06-08 | Stop reason: HOSPADM

## 2023-06-02 RX ORDER — SODIUM CHLORIDE 0.9 % (FLUSH) 0.9 %
10 SYRINGE (ML) INJECTION
Status: DISCONTINUED | OUTPATIENT
Start: 2023-06-02 | End: 2023-06-02

## 2023-06-02 RX ORDER — PROPOFOL 10 MG/ML
VIAL (ML) INTRAVENOUS
Status: DISCONTINUED | OUTPATIENT
Start: 2023-06-02 | End: 2023-06-02

## 2023-06-02 RX ORDER — MORPHINE SULFATE 4 MG/ML
4 INJECTION, SOLUTION INTRAMUSCULAR; INTRAVENOUS
Status: COMPLETED | OUTPATIENT
Start: 2023-06-02 | End: 2023-06-02

## 2023-06-02 RX ORDER — ROCURONIUM BROMIDE 10 MG/ML
INJECTION, SOLUTION INTRAVENOUS
Status: DISCONTINUED | OUTPATIENT
Start: 2023-06-02 | End: 2023-06-02

## 2023-06-02 RX ORDER — AMIODARONE HYDROCHLORIDE 200 MG/1
200 TABLET ORAL DAILY
Status: DISCONTINUED | OUTPATIENT
Start: 2023-06-02 | End: 2023-06-08 | Stop reason: HOSPADM

## 2023-06-02 RX ORDER — HALOPERIDOL 5 MG/ML
INJECTION INTRAMUSCULAR
Status: DISCONTINUED | OUTPATIENT
Start: 2023-06-02 | End: 2023-06-02

## 2023-06-02 RX ORDER — ACETAMINOPHEN 325 MG/1
650 TABLET ORAL EVERY 8 HOURS PRN
Status: DISCONTINUED | OUTPATIENT
Start: 2023-06-02 | End: 2023-06-03

## 2023-06-02 RX ORDER — LIDOCAINE HYDROCHLORIDE 20 MG/ML
INJECTION INTRAVENOUS
Status: DISCONTINUED | OUTPATIENT
Start: 2023-06-02 | End: 2023-06-02

## 2023-06-02 RX ORDER — INSULIN ASPART 100 [IU]/ML
0-5 INJECTION, SOLUTION INTRAVENOUS; SUBCUTANEOUS EVERY 6 HOURS PRN
Status: DISCONTINUED | OUTPATIENT
Start: 2023-06-02 | End: 2023-06-03

## 2023-06-02 RX ORDER — MORPHINE SULFATE 2 MG/ML
2 INJECTION, SOLUTION INTRAMUSCULAR; INTRAVENOUS EVERY 4 HOURS PRN
Status: DISCONTINUED | OUTPATIENT
Start: 2023-06-02 | End: 2023-06-02

## 2023-06-02 RX ORDER — PHENYLEPHRINE HYDROCHLORIDE 10 MG/ML
INJECTION INTRAVENOUS
Status: DISCONTINUED | OUTPATIENT
Start: 2023-06-02 | End: 2023-06-02

## 2023-06-02 RX ORDER — ACETAMINOPHEN 10 MG/ML
INJECTION, SOLUTION INTRAVENOUS
Status: DISCONTINUED | OUTPATIENT
Start: 2023-06-02 | End: 2023-06-02

## 2023-06-02 RX ORDER — OXYCODONE HYDROCHLORIDE 5 MG/1
5 TABLET ORAL EVERY 4 HOURS PRN
Status: DISCONTINUED | OUTPATIENT
Start: 2023-06-02 | End: 2023-06-08 | Stop reason: HOSPADM

## 2023-06-02 RX ORDER — ONDANSETRON 2 MG/ML
INJECTION INTRAMUSCULAR; INTRAVENOUS
Status: DISCONTINUED | OUTPATIENT
Start: 2023-06-02 | End: 2023-06-02

## 2023-06-02 RX ORDER — HALOPERIDOL 5 MG/ML
0.5 INJECTION INTRAMUSCULAR EVERY 10 MIN PRN
Status: DISCONTINUED | OUTPATIENT
Start: 2023-06-02 | End: 2023-06-02

## 2023-06-02 RX ORDER — HYDROMORPHONE HYDROCHLORIDE 1 MG/ML
0.2 INJECTION, SOLUTION INTRAMUSCULAR; INTRAVENOUS; SUBCUTANEOUS EVERY 5 MIN PRN
Status: DISCONTINUED | OUTPATIENT
Start: 2023-06-02 | End: 2023-06-02

## 2023-06-02 RX ORDER — BUPIVACAINE HYDROCHLORIDE 2.5 MG/ML
INJECTION, SOLUTION EPIDURAL; INFILTRATION; INTRACAUDAL
Status: DISCONTINUED | OUTPATIENT
Start: 2023-06-02 | End: 2023-06-02 | Stop reason: HOSPADM

## 2023-06-02 RX ORDER — ONDANSETRON 8 MG/1
8 TABLET, ORALLY DISINTEGRATING ORAL EVERY 8 HOURS PRN
Status: DISCONTINUED | OUTPATIENT
Start: 2023-06-02 | End: 2023-06-02

## 2023-06-02 RX ORDER — OXYCODONE HYDROCHLORIDE 10 MG/1
10 TABLET ORAL EVERY 4 HOURS PRN
Status: DISCONTINUED | OUTPATIENT
Start: 2023-06-02 | End: 2023-06-04

## 2023-06-02 RX ORDER — FENTANYL CITRATE 50 UG/ML
INJECTION, SOLUTION INTRAMUSCULAR; INTRAVENOUS
Status: DISCONTINUED | OUTPATIENT
Start: 2023-06-02 | End: 2023-06-02

## 2023-06-02 RX ORDER — CEFAZOLIN SODIUM 1 G/3ML
INJECTION, POWDER, FOR SOLUTION INTRAMUSCULAR; INTRAVENOUS
Status: DISCONTINUED | OUTPATIENT
Start: 2023-06-02 | End: 2023-06-02

## 2023-06-02 RX ORDER — MORPHINE SULFATE 4 MG/ML
4 INJECTION, SOLUTION INTRAMUSCULAR; INTRAVENOUS EVERY 4 HOURS PRN
Status: DISCONTINUED | OUTPATIENT
Start: 2023-06-02 | End: 2023-06-02

## 2023-06-02 RX ORDER — SODIUM CHLORIDE 0.9 % (FLUSH) 0.9 %
10 SYRINGE (ML) INJECTION
Status: DISCONTINUED | OUTPATIENT
Start: 2023-06-02 | End: 2023-06-08 | Stop reason: HOSPADM

## 2023-06-02 RX ORDER — MIDAZOLAM HYDROCHLORIDE 1 MG/ML
INJECTION, SOLUTION INTRAMUSCULAR; INTRAVENOUS
Status: DISCONTINUED | OUTPATIENT
Start: 2023-06-02 | End: 2023-06-02

## 2023-06-02 RX ORDER — TACROLIMUS 0.5 MG/1
0.5 CAPSULE ORAL 2 TIMES DAILY
Status: DISCONTINUED | OUTPATIENT
Start: 2023-06-02 | End: 2023-06-08 | Stop reason: HOSPADM

## 2023-06-02 RX ORDER — KETAMINE HCL IN 0.9 % NACL 50 MG/5 ML
SYRINGE (ML) INTRAVENOUS
Status: DISCONTINUED | OUTPATIENT
Start: 2023-06-02 | End: 2023-06-02

## 2023-06-02 RX ORDER — SUCCINYLCHOLINE CHLORIDE 20 MG/ML
INJECTION INTRAMUSCULAR; INTRAVENOUS
Status: DISCONTINUED | OUTPATIENT
Start: 2023-06-02 | End: 2023-06-02

## 2023-06-02 RX ORDER — HYDROMORPHONE HYDROCHLORIDE 1 MG/ML
0.5 INJECTION, SOLUTION INTRAMUSCULAR; INTRAVENOUS; SUBCUTANEOUS EVERY 6 HOURS PRN
Status: DISCONTINUED | OUTPATIENT
Start: 2023-06-02 | End: 2023-06-04

## 2023-06-02 RX ORDER — EPHEDRINE SULFATE 50 MG/ML
INJECTION, SOLUTION INTRAVENOUS
Status: DISCONTINUED | OUTPATIENT
Start: 2023-06-02 | End: 2023-06-02

## 2023-06-02 RX ORDER — ACETAMINOPHEN 500 MG
1000 TABLET ORAL EVERY 8 HOURS
Status: DISCONTINUED | OUTPATIENT
Start: 2023-06-02 | End: 2023-06-08 | Stop reason: HOSPADM

## 2023-06-02 RX ORDER — DEXAMETHASONE SODIUM PHOSPHATE 4 MG/ML
INJECTION, SOLUTION INTRA-ARTICULAR; INTRALESIONAL; INTRAMUSCULAR; INTRAVENOUS; SOFT TISSUE
Status: DISCONTINUED | OUTPATIENT
Start: 2023-06-02 | End: 2023-06-02

## 2023-06-02 RX ORDER — GLUCAGON 1 MG
1 KIT INJECTION
Status: DISCONTINUED | OUTPATIENT
Start: 2023-06-02 | End: 2023-06-08 | Stop reason: HOSPADM

## 2023-06-02 RX ADMIN — PIPERACILLIN AND TAZOBACTAM 4.5 G: 4; .5 INJECTION, POWDER, LYOPHILIZED, FOR SOLUTION INTRAVENOUS; PARENTERAL at 02:06

## 2023-06-02 RX ADMIN — HYDROMORPHONE HYDROCHLORIDE 0.5 MG: 1 INJECTION, SOLUTION INTRAMUSCULAR; INTRAVENOUS; SUBCUTANEOUS at 05:06

## 2023-06-02 RX ADMIN — MORPHINE SULFATE 4 MG: 4 INJECTION INTRAVENOUS at 03:06

## 2023-06-02 RX ADMIN — MORPHINE SULFATE 2 MG: 2 INJECTION, SOLUTION INTRAMUSCULAR; INTRAVENOUS at 06:06

## 2023-06-02 RX ADMIN — ONDANSETRON 4 MG: 2 INJECTION INTRAMUSCULAR; INTRAVENOUS at 12:06

## 2023-06-02 RX ADMIN — ACETAMINOPHEN 1000 MG: 10 INJECTION INTRAVENOUS at 11:06

## 2023-06-02 RX ADMIN — Medication 6 MG: at 08:06

## 2023-06-02 RX ADMIN — PIPERACILLIN AND TAZOBACTAM 4.5 G: 4; .5 INJECTION, POWDER, LYOPHILIZED, FOR SOLUTION INTRAVENOUS; PARENTERAL at 09:06

## 2023-06-02 RX ADMIN — FENTANYL CITRATE 50 MCG: 50 INJECTION, SOLUTION INTRAMUSCULAR; INTRAVENOUS at 11:06

## 2023-06-02 RX ADMIN — OXYCODONE HYDROCHLORIDE 10 MG: 10 TABLET ORAL at 04:06

## 2023-06-02 RX ADMIN — Medication 30 MG: at 11:06

## 2023-06-02 RX ADMIN — MIDAZOLAM HYDROCHLORIDE 2 MG: 1 INJECTION, SOLUTION INTRAMUSCULAR; INTRAVENOUS at 11:06

## 2023-06-02 RX ADMIN — HALOPERIDOL LACTATE 0.5 MG: 5 INJECTION, SOLUTION INTRAMUSCULAR at 11:06

## 2023-06-02 RX ADMIN — DEXAMETHASONE SODIUM PHOSPHATE 4 MG: 4 INJECTION, SOLUTION INTRAMUSCULAR; INTRAVENOUS at 11:06

## 2023-06-02 RX ADMIN — VANCOMYCIN HYDROCHLORIDE 2000 MG: 500 INJECTION, POWDER, LYOPHILIZED, FOR SOLUTION INTRAVENOUS at 12:06

## 2023-06-02 RX ADMIN — ROCURONIUM BROMIDE 50 MG: 10 INJECTION INTRAVENOUS at 11:06

## 2023-06-02 RX ADMIN — EPHEDRINE SULFATE 50 MG: 50 INJECTION INTRAVENOUS at 11:06

## 2023-06-02 RX ADMIN — PHENYLEPHRINE HYDROCHLORIDE 200 MCG: 10 INJECTION INTRAVENOUS at 11:06

## 2023-06-02 RX ADMIN — PHENYLEPHRINE HYDROCHLORIDE 300 MCG: 10 INJECTION INTRAVENOUS at 11:06

## 2023-06-02 RX ADMIN — INSULIN ASPART 2 UNITS: 100 INJECTION, SOLUTION INTRAVENOUS; SUBCUTANEOUS at 04:06

## 2023-06-02 RX ADMIN — PIPERACILLIN AND TAZOBACTAM 4.5 G: 4; .5 INJECTION, POWDER, LYOPHILIZED, FOR SOLUTION INTRAVENOUS; PARENTERAL at 06:06

## 2023-06-02 RX ADMIN — MORPHINE SULFATE 4 MG: 4 INJECTION INTRAVENOUS at 01:06

## 2023-06-02 RX ADMIN — PROPOFOL 150 MG: 10 INJECTION, EMULSION INTRAVENOUS at 11:06

## 2023-06-02 RX ADMIN — Medication 20 MG: at 12:06

## 2023-06-02 RX ADMIN — SODIUM CHLORIDE: 0.9 INJECTION, SOLUTION INTRAVENOUS at 11:06

## 2023-06-02 RX ADMIN — GLYCOPYRROLATE 0.1 MCG: 0.2 INJECTION, SOLUTION INTRAMUSCULAR; INTRAVENOUS at 11:06

## 2023-06-02 RX ADMIN — IOHEXOL 100 ML: 350 INJECTION, SOLUTION INTRAVENOUS at 01:06

## 2023-06-02 RX ADMIN — CEFAZOLIN 2 G: 1 INJECTION, POWDER, FOR SOLUTION INTRAMUSCULAR; INTRAVENOUS at 11:06

## 2023-06-02 RX ADMIN — SODIUM CHLORIDE, SODIUM ACETATE ANHYDROUS, SODIUM GLUCONATE, POTASSIUM CHLORIDE, AND MAGNESIUM CHLORIDE: 526; 222; 502; 37; 30 INJECTION, SOLUTION INTRAVENOUS at 01:06

## 2023-06-02 RX ADMIN — SUGAMMADEX 400 MG: 100 INJECTION, SOLUTION INTRAVENOUS at 01:06

## 2023-06-02 RX ADMIN — OXYCODONE HYDROCHLORIDE 10 MG: 10 TABLET ORAL at 08:06

## 2023-06-02 RX ADMIN — PROPOFOL 30 MG: 10 INJECTION, EMULSION INTRAVENOUS at 01:06

## 2023-06-02 RX ADMIN — SODIUM CHLORIDE 1500 ML: 9 INJECTION, SOLUTION INTRAVENOUS at 12:06

## 2023-06-02 RX ADMIN — SUCCINYLCHOLINE CHLORIDE 140 MG: 20 INJECTION, SOLUTION INTRAMUSCULAR; INTRAVENOUS; PARENTERAL at 11:06

## 2023-06-02 RX ADMIN — LIDOCAINE HYDROCHLORIDE 100 MG: 20 INJECTION INTRAVENOUS at 11:06

## 2023-06-02 RX ADMIN — AMIODARONE HYDROCHLORIDE 200 MG: 200 TABLET ORAL at 08:06

## 2023-06-02 RX ADMIN — ACETAMINOPHEN 1000 MG: 500 TABLET ORAL at 08:06

## 2023-06-02 NOTE — ED PROVIDER NOTES
Encounter Date: 6/1/2023       History     Chief Complaint   Patient presents with    Abdominal Pain     RLQ abdominal pain x 2 days. Nausea/ vomiting. Concerns for appendicitis. Hx liver transplant last year, no complications      This is a 67-year-old male with significant history of liver transplant January '22 who presents with 2 day history of nausea, vomiting and significant abdominal tenderness that is diffuse throughout the entire abdomen.  He denies fever, chills, chest pain, shortness breast, or diarrhea, constipation.  He follows up regularly with his hepatologist, has had no complications from liver transplant, and has repeat CT scans every 3 months.  He is currently off Cellcept due to GI upset but he does take tacro.     The history is provided by the patient, the spouse and medical records.   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    Cirrhosis 11/23/2020    Diabetes mellitus, type 2     Diabetic neuropathy 11/24/2020    Disorder of kidney and ureter     HTN (hypertension) 11/23/2020    Hyperlipidemia 11/23/2020    Kidney stones 11/23/2020    S/p lithotripsy 2020    Leukocytosis 1/15/2021    Liver mass 11/23/2020    CASTILLO (nonalcoholic steatohepatitis) 11/23/2020    Other ascites 3/16/2022    PAD (peripheral artery disease)     Portal hypertension 11/23/2020    Skin cancer 11/23/2020     Past Surgical History:   Procedure Laterality Date    COLONOSCOPY  10/2020    ESOPHAGOGASTRODUODENOSCOPY  10/2020    ESOPHAGOGASTRODUODENOSCOPY N/A 7/1/2021    Procedure: EGD (ESOPHAGOGASTRODUODENOSCOPY);  Surgeon: Jovan David MD;  Location: [facility] ([address] [place] [address]);  Service: Endoscopy;  Laterality: N/A;  HCC. Listed for liver transplant. EGD for variceal surveillance.  cardiac clearance and blood thinenr approval received, see telephone encounter  21-BB  fully vaccinated-BB  labs same day of procedure-BB    EXCISION OF HYDROCELE Right     hemorroid surgery      hemorroidectomy      KIDNEY STONE SURGERY      LEFT HEART CATHETERIZATION N/A 2021    Procedure: Left heart cath;  Surgeon: Kris Shelton MD;  Location: [facility] CATH LAB;  Service: Cardiology;  Laterality: N/A;    liver mass removal      LIVER TRANSPLANT N/A 2022    Procedure: TRANSPLANT, LIVER;  Surgeon: Lizet Garcia MD;  Location: [facility] OR [facility] FLR;  Service: Transplant;  Laterality: N/A;    RIGHT HEART CATHETERIZATION Right 2021    Procedure: INSERTION, CATHETER, RIGHT HEART;  Surgeon: Jaden Schuster MD;  Location: [facility] CATH LAB;  Service: Cardiology;  Laterality: Right;    TREATMENT OF CARDIAC ARRHYTHMIA N/A 2021    Procedure: CARDIOVERSION;  Surgeon: Didier Tilley MD;  Location: [facility] EP LAB;  Service: Cardiology;  Laterality: N/A;  a fib, dccv/johny, mac, dm. sscu    TREATMENT OF CARDIAC ARRHYTHMIA N/A 2021    Procedure: CARDIOVERSION;  Surgeon: Daniel Arambula MD;  Location: [facility] EP LAB;  Service: Cardiology;  Laterality: N/A;  afib, DCCV, anest., DM, 3 prep    TREATMENT OF CARDIAC ARRHYTHMIA N/A 2021    Procedure: Cardioversion or Defibrillation;  Surgeon: Didier Tilley MD;  Location: [facility] EP LAB;  Service: Cardiology;  Laterality: N/A;  AF, JOHNY (cancel if complaint), DCCV, MAC, DM, 3 Prep    TREATMENT OF CARDIAC ARRHYTHMIA N/A 2021    Procedure: Cardioversion or Defibrillation;  Surgeon: Didier Tilley MD;  Location: [facility] EP LAB;  Service: Cardiology;  Laterality: N/A;  AF, JOHNY (cx if complaint), DCCV, MAC, DM, 3 Prep     Family History   Problem Relation Age of Onset    Coronary artery disease Father      Social History     Tobacco Use    Smoking status: Former     Types: Cigarettes     Quit date: 1980     Years since quittin.3    Smokeless tobacco: Former   Substance Use Topics    Alcohol use: Not Currently    Drug use: Never     Review  of Systems   Constitutional:  Positive for fatigue. Negative for diaphoresis and fever.   HENT:  Negative for sore throat.    Respiratory:  Negative for shortness of breath and wheezing.    Cardiovascular:  Negative for chest pain.   Gastrointestinal:  Positive for abdominal distention, abdominal pain, nausea and vomiting. Negative for constipation and diarrhea.   Genitourinary:  Negative for dysuria.   Musculoskeletal:  Negative for back pain.   Skin:  Negative for rash.   Neurological:  Negative for speech difficulty and weakness.   Hematological:  Does not bruise/bleed easily.   Psychiatric/Behavioral:  Negative for agitation, behavioral problems and confusion.      Physical Exam     Initial Vitals [06/01/23 2116]   BP Pulse Resp Temp SpO2   124/62 102 16 98.1 °F (36.7 °C) 96 %      MAP       --         Physical Exam    Constitutional: He appears well-developed and well-nourished. He is diaphoretic. He appears distressed.   HENT:   Head: Normocephalic and atraumatic.   Cardiovascular:  Normal rate, regular rhythm and normal heart sounds.           Pulmonary/Chest: No respiratory distress. He has no wheezes.   Abdominal: He exhibits distension. There is abdominal tenderness. There is rebound and guarding.     Neurological: He is alert and oriented to person, place, and time.   Skin: Skin is warm.   Psychiatric: He has a normal mood and affect. His behavior is normal. Judgment and thought content normal.       ED Course   Procedures  Labs Reviewed   CBC W/ AUTO DIFFERENTIAL - Abnormal; Notable for the following components:       Result Value    WBC 26.19 (*)     Hemoglobin 11.5 (*)     MCV 71 (*)     MCH 20.1 (*)     MCHC 28.3 (*)     RDW 19.5 (*)     Platelets 139 (*)     Immature Granulocytes 1.0 (*)     Gran # (ANC) 22.8 (*)     Immature Grans (Abs) 0.27 (*)     Lymph # 0.5 (*)     Mono # 2.6 (*)     Gran % 87.0 (*)     Lymph % 1.9 (*)     All other components within normal limits   COMPREHENSIVE METABOLIC PANEL  - Abnormal; Notable for the following components:    CO2 22 (*)     Glucose 176 (*)     Total Bilirubin 1.2 (*)     All other components within normal limits   URINALYSIS, REFLEX TO URINE CULTURE - Abnormal; Notable for the following components:    Protein, UA Trace (*)     Glucose, UA Trace (*)     All other components within normal limits    Narrative:     Specimen Source->Urine   LACTIC ACID, PLASMA - Abnormal; Notable for the following components:    Lactate (Lactic Acid) 2.8 (*)     All other components within normal limits   PROTIME-INR - Abnormal; Notable for the following components:    Prothrombin Time 13.4 (*)     INR 1.3 (*)     All other components within normal limits   ISTAT LACTATE - Abnormal; Notable for the following components:    POC Lactate 2.80 (*)     All other components within normal limits   CULTURE, BLOOD   CULTURE, BLOOD   LIPASE          Imaging Results               CT Abdomen Pelvis With Contrast (Final result)  Result time 06/02/23 02:43:31      Final result by Doug Hart MD (06/02/23 02:43:31)                   Impression:      Dilated appendix demonstrating wall thickening and surrounding inflammatory stranding as well as adjacent free fluid concerning for acute appendicitis.  Peripheral calcified structure, likely an appendicolith at its orifice.  No adjacent free air.  No organized fluid collections to suggest abscess formation.    Sigmoid diverticulosis noting diverticula closely approximated to the inflamed appendix demonstrating surrounding inflammatory stranding, likely reactive.    Postoperative changes of orthotopic liver transplant.    Splenomegaly.    Additional findings above.    This report was flagged in Epic as abnormal.    The critical information above was relayed directly by Doug Hart MD via epic secure chat to Landy Norris MD on 6/2/2023 at 02:37.    Electronically signed by resident: Clark  Darnell  Date:    06/02/2023  Time:    02:11    Electronically signed by: Doug Hart MD  Date:    06/02/2023  Time:    02:43               Narrative:    EXAMINATION:  CT ABDOMEN PELVIS WITH CONTRAST    CLINICAL HISTORY:  Nausea/vomiting;Abdominal pain, acute, nonlocalized;    TECHNIQUE:  Low dose axial images, sagittal and coronal reformations were obtained from the lung bases to the pubic symphysis following the IV administration of 100 mL of Omnipaque 350 .  Oral contrast was not administered.    COMPARISON:  CT abdomen 03/07/2023, CT chest abdomen pelvis 11/11/2022    FINDINGS:  Heart: Aortic valve calcifications.  Right coronary artery stent versus calcification.  Normal size.  No pericardial fusion.    Lung Bases: Lingular bandlike opacity, atelectasis or scar.  Bilateral dependent atelectasis.  No consolidation or pleural effusion.    Liver: Postoperative changes of liver transplant.  Mild periportal edema, unchanged.  No focal lesions.    Gallbladder: Surgically absent    Bile Ducts: Similar mild intrahepatic biliary ductal dilatation.  No extrahepatic biliary ductal dilatation.    Pancreas: No mass or peripancreatic fat stranding.    Spleen: Enlarged measuring 14.7 cm maximally.  Punctate calcification.    Adrenals: Unremarkable. No focal lesions.    Kidneys/ Ureters: Kidneys enhance symmetrically.  Few stable punctate bilateral nonobstructive nephroliths versus vascular calcifications.  Stable bilateral renal cysts and additional hypodensities which are too small to adequately characterize.  No hydroureteronephrosis.    Bladder: No evidence of wall thickening.    Reproductive organs: Dystrophic prostatic calcifications.    GI Tract/Mesentery: The appendix is enlarged measuring 1.3 cm in diameter with wall thickening and surrounding inflammatory stranding.  Peripherally calcified structure at its orifice which may reflect a fecalith.  Surrounding abdominal free fluid is noted.  The sigmoid colon courses  adjacent to the inflamed appendix noting several diverticula.  The diverticulae closest to the appendix demonstrates surrounding inflammatory stranding favored reactive.  Hypervascularity and inflammatory stranding of the mesentery surrounding the appendix and extending to the right upper quadrant.  No definite free air.  No evidence of bowel obstruction.    Peritoneal Space: Small volume abdominal free fluid centered around the inflamed appendix and traveling along the bilateral pericolic gutters.  No free air.    Retroperitoneum: No significant adenopathy.    Abdominal wall: Small fat containing paraumbilical hernia.    Vasculature: Dense aortoiliac calcific atherosclerosis.  Atherosclerotic disease includes the origin of the visceral branches.  No aneurysm.  The portal veins are grossly patent.    Bones: Osseous degenerative changes without acute fracture. No osseous destructive lesions.                                       X-Ray Chest AP Portable (Final result)  Result time 06/01/23 23:30:04      Final result by Jan Narvaez DO (06/01/23 23:30:04)                   Impression:      No acute abnormality.      Electronically signed by: Jan Narvaez  Date:    06/01/2023  Time:    23:30               Narrative:    EXAMINATION:  XR CHEST AP PORTABLE    CLINICAL HISTORY:  Unspecified abdominal pain    TECHNIQUE:  Single frontal view of the chest was performed.    COMPARISON:  03/18/2022.    FINDINGS:  The lungs are well expanded and clear. No focal opacities are seen. The pleural spaces are clear.    The cardiac silhouette is unremarkable.  There are calcifications of the aortic arch.    The visualized osseous structures demonstrate degenerative changes.                                       Medications   ondansetron injection 8 mg (8 mg Intravenous Given 6/1/23 0584)   sodium chloride 0.9% bolus 1,000 mL 1,000 mL (0 mLs Intravenous Stopped 6/2/23 0018)   morphine injection 4 mg (4 mg Intravenous Given 6/1/23  "3046)   piperacillin-tazobactam (ZOSYN) 4.5 g in dextrose 5 % in water (D5W) 5 % 100 mL IVPB (MB+) (0 g Intravenous Stopped 6/2/23 0018)   vancomycin 2 g in dextrose 5 % 500 mL IVPB (2,000 mg Intravenous New Bag 6/2/23 0018)   sodium chloride 0.9% bolus 1,500 mL 1,500 mL (0 mLs Intravenous Stopped 6/2/23 0126)   morphine injection 4 mg (4 mg Intravenous Given 6/2/23 0115)   iohexoL (OMNIPAQUE 350) injection 100 mL (100 mLs Intravenous Given 6/2/23 0139)     Medical Decision Making:   Initial Assessment:   67-year-old male with recent liver transplant January 2022 presents with abdominal tenderness, nausea and vomiting.  Differential Diagnosis:   Intra-abdominal infection  Hepatobiliary insufficiency  Appendicitis  SBP    Clinical Tests:   Lab Tests: Ordered  Radiological Study: Ordered  Medical Tests: Ordered  Sepsis Perfusion Assessment: "I attest a sepsis perfusion exam was performed within 6 hours of sepsis, severe sepsis, or septic shock presentation, following fluid resuscitation."  ED Management:  Septic workup ordered, antiemetic, and fluids given. New WBC of 22. CT abdominal pelvis, ordered. Given vanc and zosyn, concerns for peritoneal signs.  CT scan concern for acute appendicitis.  General surgery consult and in paged.          Attending Attestation:   Physician Attestation Statement for Resident:  As the supervising MD   Physician Attestation Statement: I have personally seen and examined this patient.   I agree with the above history.  -:   As the supervising MD I agree with the above PE.     As the supervising MD I agree with the above treatment, course, plan, and disposition.   -: 67-year-old man status post liver transplant a year and a half ago presents for abdominal pain for the last 2 days located on the right side, nonradiating, getting worse, associated with vomiting unable to tolerate p.o.   denies any urinary symptoms/dysuria/foul-smelling urine   he also denies diarrhea but reports " constipation    2 weeks ago he developed abdominal pain and CellCept was discontinued, he continues to take tacrolimus   he is compliant with his medications, has history of AFib and he is on apixaban      Comfortable in the room, abd soft, + diffusely ttp but more intense in the RLQ with peritoneal signs    Elevated wbc, antibiotics, IVF  CT a/p with acute appendicitis   consulted and patient admitted to                  ED Course as of 06/02/23 0313   Thu Jun 01, 2023   2259 WBC(!): 26.19 [CW]   2319 Lactate, Alejandro(!): 2.8 [CW]   Fri Jun 02, 2023   0002 INR(!): 1.3 [CW]   0002 X-Ray Chest AP Portable  Chest x-ray unremarkable [CW]   0117 Lipase: 8 [CW]   0214 POC Lactate(!): 2.80 [CW]   0301 CT Abdomen Pelvis With Contrast(!)  Concern for acute appendicitis.  Surgery paged and aware patient. [CW]      ED Course User Index  [CW] Rob Jones DO                   Clinical Impression:   Final diagnoses:  [R10.9] Abdominal pain  [K35.80] Acute appendicitis, unspecified acute appendicitis type (Primary)     67-year-old patient is status post liver transplant over 1 year presents with acute tenderness, with associated nausea vomiting to the abdomen.  White blood cell count elevated over 22, lactic acidosis over to post fluid resuscitation.  Broad-spectrum antibiotics including vanc and Zosyn given.  General surgery consulted for acute appendicitis seen on CT scan.          Rob Jones DO  Resident  06/02/23 0313       Jaylin Norris MD  06/02/23 1722

## 2023-06-02 NOTE — PROGRESS NOTES
Attempted to call report to transport pt to surgery x2 attempts. [phone]. No answer. Will try again.

## 2023-06-02 NOTE — CARE UPDATE
General Surgery    Plan for OR today for lap appendectomy    - NPO  - Continue Zosyn  - Hold Eliquis

## 2023-06-02 NOTE — CONSULTS
Eric Bañuelos - Surgery ([facility])  Hepatology  Consult Note    Patient Name: Tej Purdy  MRN: [id number]  Admission Date: 6/1/2023  Hospital Length of Stay: 0 days  Attending Provider: Shan Ross MD   Primary Care Physician: Thais Maxwell MD  Principal Problem:Acute appendicitis    Inpatient consult to Hepatology  Consult performed by: Mamadou Lyn MD  Consult ordered by: Juanpablo Campoverde MD        Subjective:     Transplant status: Post-transplant    HPI:  Mr. Tej Purdy is a 67 year old male for whom hepatology is consulted for immunosuppression management post-liver transplant. He has a PMH significant for cholangiocarcinoma (status post liver transplant in 01/2022), aortic stenosis, atrial fibrillation (on Apixaban), and peripheral artery disease (on ASA).     Hospital Course:   Patient presented to [facility] on 06/01 with approximately two day duration of right lower quadrant abdominal pain associated with nausea and non-bloody vomiting. Presentation notable for normal vital signs on arrival and labs showing leukocytosis (26), elevated INR (1.3), and normal LFT's. CT A/P showed acute appendicitis. He was admitted to general surgery with plans for appendectomy on 06/02. Hepatology consulted for management of immunosuppression.       Review of Systems   Constitutional:  Positive for fatigue. Negative for appetite change, chills and fever.   HENT:  Negative for congestion, sore throat and trouble swallowing.    Eyes:  Negative for photophobia, pain and discharge.   Respiratory:  Negative for cough and shortness of breath.    Cardiovascular:  Negative for chest pain and palpitations.   Gastrointestinal:  Positive for abdominal pain and nausea. Negative for diarrhea and vomiting.   Genitourinary:  Negative for difficulty urinating.   Musculoskeletal:  Negative for back pain, myalgias and neck pain.   Skin:  Negative for pallor and rash.   Neurological:  Negative for light-headedness, numbness and  headaches.     Past Medical History:   Diagnosis Date    Atrial fibrillation     Cholangiocarcinoma 3/16/2022    Cirrhosis 11/23/2020    Diabetes mellitus, type 2     Diabetic neuropathy 11/24/2020    Disorder of kidney and ureter     HTN (hypertension) 11/23/2020    Hyperlipidemia 11/23/2020    Kidney stones 11/23/2020    S/p lithotripsy 2020    Leukocytosis 1/15/2021    Liver mass 11/23/2020    CASTILLO (nonalcoholic steatohepatitis) 11/23/2020    Other ascites 3/16/2022    PAD (peripheral artery disease)     Portal hypertension 11/23/2020    Skin cancer 11/23/2020       Past Surgical History:   Procedure Laterality Date    COLONOSCOPY  10/2020    ESOPHAGOGASTRODUODENOSCOPY  10/2020    ESOPHAGOGASTRODUODENOSCOPY N/A 7/1/2021    Procedure: EGD (ESOPHAGOGASTRODUODENOSCOPY);  Surgeon: Jovan David MD;  Location: [facility] (4TH FLR);  Service: Endoscopy;  Laterality: N/A;  HCC. Listed for liver transplant. EGD for variceal surveillance.  cardiac clearance and blood thinenr approval received, see telephone encounter 6/23/21-BB  fully vaccinated-BB  labs same day of procedure-BB    EXCISION OF HYDROCELE Right     hemorroid surgery      hemorroidectomy      KIDNEY STONE SURGERY      LEFT HEART CATHETERIZATION N/A 1/27/2021    Procedure: Left heart cath;  Surgeon: Kris Shelton MD;  Location: [facility] CATH LAB;  Service: Cardiology;  Laterality: N/A;    liver mass removal      LIVER TRANSPLANT N/A 1/6/2022    Procedure: TRANSPLANT, LIVER;  Surgeon: Lizet Garcia MD;  Location: [facility] OR 2ND FLR;  Service: Transplant;  Laterality: N/A;    RIGHT HEART CATHETERIZATION Right 5/7/2021    Procedure: INSERTION, CATHETER, RIGHT HEART;  Surgeon: Jaden Schuster MD;  Location: [facility] CATH LAB;  Service: Cardiology;  Laterality: Right;    TREATMENT OF CARDIAC ARRHYTHMIA N/A 5/19/2021    Procedure: CARDIOVERSION;  Surgeon: Didier Tilley MD;  Location: [facility] EP LAB;  Service: Cardiology;  Laterality: N/A;   a fib, dccv/johny, mac, dm. sscu    TREATMENT OF CARDIAC ARRHYTHMIA N/A 2021    Procedure: CARDIOVERSION;  Surgeon: Daniel Arambula MD;  Location: [facility] EP LAB;  Service: Cardiology;  Laterality: N/A;  afib, DCCV, anest., DM, 3 prep    TREATMENT OF CARDIAC ARRHYTHMIA N/A 2021    Procedure: Cardioversion or Defibrillation;  Surgeon: Didier Tilley MD;  Location: [facility] EP LAB;  Service: Cardiology;  Laterality: N/A;  AF, JOHNY (cancel if complaint), DCCV, MAC, DM, 3 Prep    TREATMENT OF CARDIAC ARRHYTHMIA N/A 2021    Procedure: Cardioversion or Defibrillation;  Surgeon: Didier Tilley MD;  Location: [facility] EP LAB;  Service: Cardiology;  Laterality: N/A;  AF, JOHNY (cx if complaint), DCCV, MAC, DM, 3 Prep     Review of patient's allergies indicates:   Allergen Reactions    Bee pollens Swelling     BEE STINGS swells body         Tobacco Use    Smoking status: Former     Types: Cigarettes     Quit date: 1980     Years since quittin.3    Smokeless tobacco: Former   Substance and Sexual Activity    Alcohol use: Not Currently    Drug use: Never    Sexual activity: Not on file       Medications Prior to Admission   Medication Sig Dispense Refill Last Dose    amiodarone (PACERONE) 200 MG Tab Take 1 tablet (200 mg total) by mouth once daily. 30 tablet 11 2023    apixaban (ELIQUIS) 5 mg Tab Take 1 tablet (5 mg total) by mouth 2 (two) times daily. 60 tablet 3 2023    aspirin (ECOTRIN) 81 MG EC tablet Take 81 mg by mouth once daily.   2023    calcium carbonate-vitamin D3 600 mg-20 mcg (800 unit) Tab Take 1 tablet by mouth once daily. 30 tablet 5 2023 at 0900    gabapentin (NEURONTIN) 300 MG capsule Take 1 capsule (300 mg total) by mouth 3 (three) times daily as needed (neuropathy). 90 capsule 5 Past Month    insulin (LANTUS SOLOSTAR U-100 INSULIN) glargine 100 units/mL SubQ pen INJECT 22 UNITS INTO THE SKIN EVERY EVENING. ADJUST DOSE UNTIL AM GLUCOSE GOAL . MAX DOSE 30  "UNITS/DY 15 mL 11 Past Week at 0900    insulin lispro (HUMALOG KWIKPEN INSULIN) 100 unit/mL pen Inject 6 Units into the skin 3 (three) times daily with meals. Plus sliding scale, MDD: 48 units 15 mL 11 Past Week    lovastatin (MEVACOR) 20 MG tablet Take 20 mg by mouth once daily.   6/1/2023 at 0900    magnesium oxide (MAG-OX) 400 mg (241.3 mg magnesium) tablet Take 2 tablets (800 mg total) by mouth 3 (three) times daily. 180 tablet 6 6/1/2023    multivitamin capsule Take 1 capsule by mouth once daily.   6/1/2023    omega-3 fatty acids/fish oil (FISH OIL-OMEGA-3 FATTY ACIDS) 300-1,000 mg capsule Take 1 capsule by mouth once daily.    6/1/2023    ondansetron (ZOFRAN-ODT) 4 MG TbDL Dissolve 1 tablet (4 mg total) by mouth every 8 (eight) hours as needed (nausea). 30 tablet 5 6/2/2023 at 00    pantoprazole (PROTONIX) 40 MG tablet Take 1 tablet (40 mg total) by mouth once daily. 30 tablet 5 6/1/2023 at 00    sertraline (ZOLOFT) 25 MG tablet Take 1 tablet (25 mg total) by mouth once daily. 30 tablet 5 6/1/2023    tacrolimus (PROGRAF) 0.5 MG Cap Take 1 capsule (0.5 mg total) by mouth every 12 (twelve) hours. 60 capsule 11 6/1/2023    blood sugar diagnostic Strp To check BG 2 times daily, to use with insurance preferred meter (patient has a [employer] self-monitoring glucose meter) 100 strip 11 Unknown    blood-glucose meter kit To check BG 2 times daily, to use with insurance preferred meter 1 each 0 Unknown    calcium carbonate (TUMS) 200 mg calcium (500 mg) chewable tablet Take 1 tablet (500 mg total) by mouth 3 (three) times daily as needed for Heartburn. 90 tablet 5     furosemide (LASIX) 20 MG tablet Take 1 tablet (20 mg total) by mouth once daily. 30 tablet 2 More than a month    lancets Misc To check BG 2 times daily, to use with insurance preferred meter 100 each 11 Unknown    pen needle, diabetic (BD ULTRA-FINE GÉNESIS PEN NEEDLE) 32 gauge x 5/32" Ndle Use to inject insulin 4 times daily 200 each 11 " Unknown    pulse oximeter (PULSE OXIMETER) device by Apply Externally route 2 (two) times a day. Use twice daily at 8 AM and 3 PM and record the value in MyChart as directed. (Patient not taking: Reported on 8/22/2022) 1 each 0     zolpidem (AMBIEN) 5 MG Tab Take 1 tablet (5 mg total) by mouth nightly as needed (insomnia). 30 tablet 0        Objective:     Vital Signs (Most Recent):  Temp: 98.8 °F (37.1 °C) (06/02/23 1012)  Pulse: 90 (06/02/23 1012)  Resp: 20 (06/02/23 1012)  BP: 139/65 (06/02/23 1012)  SpO2: (!) 93 % (06/02/23 1012) Vital Signs (24h Range):  Temp:  [98.1 °F (36.7 °C)-98.8 °F (37.1 °C)] 98.8 °F (37.1 °C)  Pulse:  [] 90  Resp:  [13-20] 20  SpO2:  [93 %-98 %] 93 %  BP: (111-154)/(53-71) 139/65     Weight: 102.5 kg (226 lb) (06/02/23 1012)  Body mass index is 30.65 kg/m².       Physical Exam  Constitutional:       Appearance: Normal appearance. He is obese. He is not ill-appearing.   Eyes:      General: No scleral icterus.  Cardiovascular:      Rate and Rhythm: Normal rate and regular rhythm.      Pulses: Normal pulses.      Heart sounds: Normal heart sounds.   Pulmonary:      Effort: Pulmonary effort is normal. No respiratory distress.      Breath sounds: Normal breath sounds.   Abdominal:      General: Bowel sounds are normal. There is no distension.      Palpations: Abdomen is soft.      Tenderness: There is abdominal tenderness in the right lower quadrant. There is guarding.   Skin:     General: Skin is warm and dry.      Coloration: Skin is not jaundiced.   Neurological:      Mental Status: He is alert and oriented to person, place, and time.          MELD-Na: 12 at 6/1/2023 11:09 PM  MELD: 12 at 6/1/2023 11:09 PM  Calculated from:  Serum Creatinine: 1.2 mg/dL at 6/1/2023 10:27 PM  Serum Sodium: 138 mmol/L (Using max of 137 mmol/L) at 6/1/2023 10:27 PM  Total Bilirubin: 1.2 mg/dL at 6/1/2023 10:27 PM  INR(ratio): 1.3 at 6/1/2023 11:09 PM      Significant Labs:  Labs within the past month  have been reviewed.    Significant Imaging:  CT: Reviewed    Assessment/Plan:     GI  S/P liver transplant  This is a 67 year old male with PMH significant for cholangiocarcinoma (status post liver transplant in 01/2022), aortic stenosis, atrial fibrillation (on Apixaban), and peripheral artery disease (on ASA) who was admitted to [facility] on 06/01 for management of acute appendicitis. He is pending appendectomy today. Hepatology consulted for management of immunosuppression. LFTs normal.     Recommendations:     -Tacrolimus 0.5 mg BID.   -CMP, INR, and Tacrolimus level daily.         Thank you for your consult. I will follow-up with patient. Please contact us if you have any additional questions.    Mamadou Lyn MD  Hepatology  [facility] - Surgery (2nd Fl)

## 2023-06-02 NOTE — ANESTHESIA PREPROCEDURE EVALUATION
06/02/2023  Tej Purdy is a 67 y.o., male.    Patient Active Problem List   Diagnosis    Hyperlipidemia    HTN (hypertension)    Skin cancer    Kidney stone    Diabetic neuropathy    PAD (peripheral artery disease)    Type 2 diabetes mellitus    Coronary artery disease    Persistent atrial fibrillation    History of cardioversion    Current use of anticoagulant therapy    S/P liver transplant    At risk for opportunistic infections    Prophylactic immunotherapy    Anemia of chronic disease    Long-term use of immunosuppressant medication    Weakness    Type 2 diabetes mellitus with hyperglycemia    Anemia due to unknown mechanism    Fatigue    Other ascites    Cholangiocarcinoma    SOB (shortness of breath)    Nonrheumatic aortic valve stenosis     Past Surgical History:   Procedure Laterality Date    COLONOSCOPY  10/2020    ESOPHAGOGASTRODUODENOSCOPY  10/2020    ESOPHAGOGASTRODUODENOSCOPY N/A 7/1/2021    Procedure: EGD (ESOPHAGOGASTRODUODENOSCOPY);  Surgeon: Jovan David MD;  Location: [facility] (4TH FLR);  Service: Endoscopy;  Laterality: N/A;  HCC. Listed for liver transplant. EGD for variceal surveillance.  cardiac clearance and blood thinenr approval received, see telephone encounter 6/23/21-BB  fully vaccinated-BB  labs same day of procedure-BB    EXCISION OF HYDROCELE Right     hemorroid surgery      hemorroidectomy      KIDNEY STONE SURGERY      LEFT HEART CATHETERIZATION N/A 1/27/2021    Procedure: Left heart cath;  Surgeon: Kris Shelton MD;  Location: [facility] CATH LAB;  Service: Cardiology;  Laterality: N/A;    liver mass removal      LIVER TRANSPLANT N/A 1/6/2022    Procedure: TRANSPLANT, LIVER;  Surgeon: Lizet Garcia MD;  Location: [facility] OR 2ND FLR;  Service: Transplant;  Laterality: N/A;    RIGHT HEART CATHETERIZATION Right 5/7/2021    Procedure:  INSERTION, CATHETER, RIGHT HEART;  Surgeon: Jaden Schuster MD;  Location: [facility] CATH LAB;  Service: Cardiology;  Laterality: Right;    TREATMENT OF CARDIAC ARRHYTHMIA N/A 5/19/2021    Procedure: CARDIOVERSION;  Surgeon: Didier Tilley MD;  Location: [facility] EP LAB;  Service: Cardiology;  Laterality: N/A;  a fib, dccv/johny, mac, dm. sscu    TREATMENT OF CARDIAC ARRHYTHMIA N/A 5/31/2021    Procedure: CARDIOVERSION;  Surgeon: Daniel Arambula MD;  Location: [facility] EP LAB;  Service: Cardiology;  Laterality: N/A;  afib, DCCV, anest., DM, 3 prep    TREATMENT OF CARDIAC ARRHYTHMIA N/A 7/7/2021    Procedure: Cardioversion or Defibrillation;  Surgeon: Didier Tilley MD;  Location: [facility] EP LAB;  Service: Cardiology;  Laterality: N/A;  AF, JOHNY (cancel if complaint), DCCV, MAC, DM, 3 Prep    TREATMENT OF CARDIAC ARRHYTHMIA N/A 7/27/2021    Procedure: Cardioversion or Defibrillation;  Surgeon: Didier Tilley MD;  Location: [facility] EP LAB;  Service: Cardiology;  Laterality: N/A;  AF, JOHNY (cx if complaint), DCCV, MAC, DM, 3 Prep         Pre-op Assessment    I have reviewed the Patient Summary Reports.     I have reviewed the Nursing Notes. I have reviewed the NPO Status.      Review of Systems      Physical Exam  General: Well nourished    Airway:  Mallampati: II   Mouth Opening: Normal  TM Distance: Normal  Tongue: Normal  Neck ROM: Normal ROM        Anesthesia Plan  Type of Anesthesia, risks & benefits discussed:    Anesthesia Type: Gen ETT, Gen Natural Airway  Intra-op Monitoring Plan: Standard ASA Monitors  Post Op Pain Control Plan: multimodal analgesia  Induction:  IV  Airway Plan: Direct  Informed Consent: Informed consent signed with the Patient and all parties understand the risks and agree with anesthesia plan.  All questions answered.   ASA Score: 2  Day of Surgery Review of History & Physical: H&P Update referred to the surgeon/provider.    Ready For Surgery From Anesthesia Perspective.     .

## 2023-06-02 NOTE — PROGRESS NOTES
Admitted to pacu 22, oral airway in place, vs stable, abd dressing CDI, drain full with serous drainage.

## 2023-06-02 NOTE — ED NOTES
Care assumed from [facility] and Raf RN. Hourly rounding complete. Patient resting in stretcher and is in NAD at this time. Pt is awake alert and oriented x4, VSS. Patient rates pain in RQ abdomen 8/10. Pt updated on POC. Bed low and locked, SR up x2, call bell in patient reach. Pt remains on continuous cardiac monitor, continuous pulse ox, and auto BP cuff. Pt voices no needs at this time.     Patient identifiers verified and correct for Tej Gurwinder  LOC: The patient is awake, alert and aware of environment with an appropriate affect, the patient is oriented x 3 and speaking appropriately.   APPEARANCE: Patient appears comfortable and in no acute distress, patient is clean and well groomed.  SKIN: The skin is warm and dry, color consistent with ethnicity, patient has normal skin turgor and moist mucus membranes, skin intact, no breakdown or bruising noted.   MUSCULOSKELETAL: Patient moving all extremities spontaneously, no swelling noted.  RESPIRATORY: Airway is open and patent, respirations are spontaneous, patient has a normal effort and rate, no accessory muscle use noted, pt placed on continuous pulse ox with O2 sats noted at 94% on room air.  CARDIAC: Pt placed on cardiac monitor. Patient has a normal rate and regular rhythm, no edema noted, capillary refill < 3 seconds. Denies chest pain  GASTRO: R sided abdominal pain rates pain 8/10  : Pt denies any pain or frequency with urination.  NEURO: Pt opens eyes spontaneously, behavior appropriate to situation, follows commands, facial expression symmetrical, bilateral hand grasp equal and even, purposeful motor response noted, normal sensation in all extremities when touched with a finger.

## 2023-06-02 NOTE — ANESTHESIA POSTPROCEDURE EVALUATION
Anesthesia Post Evaluation    Patient: Tej Purdy    Procedure(s) Performed: Procedure(s) (LRB):  APPENDECTOMY, LAPAROSCOPIC (N/A)    Final Anesthesia Type: general      Patient location during evaluation: PACU  Patient participation: Yes- Able to Participate  Level of consciousness: awake and alert and oriented  Pain management: adequate  Airway patency: patent    PONV status at discharge: No PONV  Anesthetic complications: no      Cardiovascular status: blood pressure returned to baseline and hemodynamically stable  Respiratory status: unassisted  Hydration status: euvolemic  Follow-up not needed.          Vitals Value Taken Time   /56 06/02/23 1340   Temp 36.6 °C (97.9 °F) 06/02/23 1325   Pulse 79 06/02/23 1349   Resp 13 06/02/23 1349   SpO2 100 % 06/02/23 1349   Vitals shown include unvalidated device data.      No case tracking events are documented in the log.      Pain/Cordelia Score: Pain Rating Prior to Med Admin: 8 (6/2/2023  6:43 AM)  Pain Rating Post Med Admin: 7 (6/2/2023  8:15 AM)  Cordelia Score: 4 (6/2/2023  1:25 PM)

## 2023-06-02 NOTE — SUBJECTIVE & OBJECTIVE
Review of Systems   Constitutional:  Positive for fatigue. Negative for appetite change, chills and fever.   HENT:  Negative for congestion, sore throat and trouble swallowing.    Eyes:  Negative for photophobia, pain and discharge.   Respiratory:  Negative for cough and shortness of breath.    Cardiovascular:  Negative for chest pain and palpitations.   Gastrointestinal:  Positive for abdominal pain and nausea. Negative for diarrhea and vomiting.   Genitourinary:  Negative for difficulty urinating.   Musculoskeletal:  Negative for back pain, myalgias and neck pain.   Skin:  Negative for pallor and rash.   Neurological:  Negative for light-headedness, numbness and headaches.     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    Cirrhosis 11/23/2020    Diabetes mellitus, type 2     Diabetic neuropathy 11/24/2020    Disorder of kidney and ureter     HTN (hypertension) 11/23/2020    Hyperlipidemia 11/23/2020    Kidney stones 11/23/2020    S/p lithotripsy 2020    Leukocytosis 1/15/2021    Liver mass 11/23/2020    CASTILLO (nonalcoholic steatohepatitis) 11/23/2020    Other ascites 3/16/2022    PAD (peripheral artery disease)     Portal hypertension 11/23/2020    Skin cancer 11/23/2020       Past Surgical History:   Procedure Laterality Date    COLONOSCOPY  10/2020    ESOPHAGOGASTRODUODENOSCOPY  10/2020    ESOPHAGOGASTRODUODENOSCOPY N/A 7/1/2021    Procedure: EGD (ESOPHAGOGASTRODUODENOSCOPY);  Surgeon: Jovan David MD;  Location: [address];  Service: Endoscopy;  Laterality: N/A;  HCC. Listed for liver transplant. EGD for variceal surveillance.  cardiac clearance and blood thinenr approval received, see telephone encounter 6/23/21-BB  fully vaccinated-BB  labs same day of procedure-BB    EXCISION OF HYDROCELE Right     hemorroid surgery      hemorroidectomy      KIDNEY STONE SURGERY      LEFT HEART CATHETERIZATION N/A 1/27/2021    Procedure: Left heart cath;   Surgeon: Kris Shelton MD;  Location: [facility] CATH LAB;  Service: Cardiology;  Laterality: N/A;    liver mass removal      LIVER TRANSPLANT N/A 2022    Procedure: TRANSPLANT, LIVER;  Surgeon: Lizet Garcia MD;  Location: [facility] OR 2ND FLR;  Service: Transplant;  Laterality: N/A;    RIGHT HEART CATHETERIZATION Right 2021    Procedure: INSERTION, CATHETER, RIGHT HEART;  Surgeon: Jaden Schuster MD;  Location: [facility] CATH LAB;  Service: Cardiology;  Laterality: Right;    TREATMENT OF CARDIAC ARRHYTHMIA N/A 2021    Procedure: CARDIOVERSION;  Surgeon: Didier Tilley MD;  Location: [facility] EP LAB;  Service: Cardiology;  Laterality: N/A;  a fib, dccv/johny, mac, dm. sscu    TREATMENT OF CARDIAC ARRHYTHMIA N/A 2021    Procedure: CARDIOVERSION;  Surgeon: Daniel Arambula MD;  Location: [facility] EP LAB;  Service: Cardiology;  Laterality: N/A;  afib, DCCV, anest., DM, 3 prep    TREATMENT OF CARDIAC ARRHYTHMIA N/A 2021    Procedure: Cardioversion or Defibrillation;  Surgeon: Didier Tilley MD;  Location: [facility] EP LAB;  Service: Cardiology;  Laterality: N/A;  AF, JOHNY (cancel if complaint), DCCV, MAC, DM, 3 Prep    TREATMENT OF CARDIAC ARRHYTHMIA N/A 2021    Procedure: Cardioversion or Defibrillation;  Surgeon: Didier Tilley MD;  Location: [facility] EP LAB;  Service: Cardiology;  Laterality: N/A;  AF, JOHNY (cx if complaint), DCCV, MAC, DM, 3 Prep     Review of patient's allergies indicates:   Allergen Reactions    Bee pollens Swelling     BEE STINGS swells body         Tobacco Use    Smoking status: Former     Types: Cigarettes     Quit date: 1980     Years since quittin.3    Smokeless tobacco: Former   Substance and Sexual Activity    Alcohol use: Not Currently    Drug use: Never    Sexual activity: Not on file       Medications Prior to Admission   Medication Sig Dispense Refill Last Dose    amiodarone (PACERONE) 200 MG Tab Take 1 tablet (200 mg total) by mouth once daily. 30 tablet 11 2023     apixaban (ELIQUIS) 5 mg Tab Take 1 tablet (5 mg total) by mouth 2 (two) times daily. 60 tablet 3 6/1/2023    aspirin (ECOTRIN) 81 MG EC tablet Take 81 mg by mouth once daily.   6/1/2023    calcium carbonate-vitamin D3 600 mg-20 mcg (800 unit) Tab Take 1 tablet by mouth once daily. 30 tablet 5 6/1/2023 at 0900    gabapentin (NEURONTIN) 300 MG capsule Take 1 capsule (300 mg total) by mouth 3 (three) times daily as needed (neuropathy). 90 capsule 5 Past Month    insulin (LANTUS SOLOSTAR U-100 INSULIN) glargine 100 units/mL SubQ pen INJECT 22 UNITS INTO THE SKIN EVERY EVENING. ADJUST DOSE UNTIL AM GLUCOSE GOAL . MAX DOSE 30 UNITS/DY 15 mL 11 Past Week at 0900    insulin lispro (HUMALOG KWIKPEN INSULIN) 100 unit/mL pen Inject 6 Units into the skin 3 (three) times daily with meals. Plus sliding scale, MDD: 48 units 15 mL 11 Past Week    lovastatin (MEVACOR) 20 MG tablet Take 20 mg by mouth once daily.   6/1/2023 at 0900    magnesium oxide (MAG-OX) 400 mg (241.3 mg magnesium) tablet Take 2 tablets (800 mg total) by mouth 3 (three) times daily. 180 tablet 6 6/1/2023    multivitamin capsule Take 1 capsule by mouth once daily.   6/1/2023    omega-3 fatty acids/fish oil (FISH OIL-OMEGA-3 FATTY ACIDS) 300-1,000 mg capsule Take 1 capsule by mouth once daily.    6/1/2023    ondansetron (ZOFRAN-ODT) 4 MG TbDL Dissolve 1 tablet (4 mg total) by mouth every 8 (eight) hours as needed (nausea). 30 tablet 5 6/2/2023 at 00    pantoprazole (PROTONIX) 40 MG tablet Take 1 tablet (40 mg total) by mouth once daily. 30 tablet 5 6/1/2023 at 00    sertraline (ZOLOFT) 25 MG tablet Take 1 tablet (25 mg total) by mouth once daily. 30 tablet 5 6/1/2023    tacrolimus (PROGRAF) 0.5 MG Cap Take 1 capsule (0.5 mg total) by mouth every 12 (twelve) hours. 60 capsule 11 6/1/2023    blood sugar diagnostic Strp To check BG 2 times daily, to use with insurance preferred meter (patient has a [employer] self-monitoring glucose meter) 100 strip 11 Unknown  "   blood-glucose meter kit To check BG 2 times daily, to use with insurance preferred meter 1 each 0 Unknown    calcium carbonate (TUMS) 200 mg calcium (500 mg) chewable tablet Take 1 tablet (500 mg total) by mouth 3 (three) times daily as needed for Heartburn. 90 tablet 5     furosemide (LASIX) 20 MG tablet Take 1 tablet (20 mg total) by mouth once daily. 30 tablet 2 More than a month    lancets Misc To check BG 2 times daily, to use with insurance preferred meter 100 each 11 Unknown    pen needle, diabetic (BD ULTRA-FINE GÉNESIS PEN NEEDLE) 32 gauge x 5/32" Ndle Use to inject insulin 4 times daily 200 each 11 Unknown    pulse oximeter (PULSE OXIMETER) device by Apply Externally route 2 (two) times a day. Use twice daily at 8 AM and 3 PM and record the value in MyChart as directed. (Patient not taking: Reported on 8/22/2022) 1 each 0     zolpidem (AMBIEN) 5 MG Tab Take 1 tablet (5 mg total) by mouth nightly as needed (insomnia). 30 tablet 0        Objective:     Vital Signs (Most Recent):  Temp: 98.8 °F (37.1 °C) (06/02/23 1012)  Pulse: 90 (06/02/23 1012)  Resp: 20 (06/02/23 1012)  BP: 139/65 (06/02/23 1012)  SpO2: (!) 93 % (06/02/23 1012) Vital Signs (24h Range):  Temp:  [98.1 °F (36.7 °C)-98.8 °F (37.1 °C)] 98.8 °F (37.1 °C)  Pulse:  [] 90  Resp:  [13-20] 20  SpO2:  [93 %-98 %] 93 %  BP: (111-154)/(53-71) 139/65     Weight: 102.5 kg (226 lb) (06/02/23 1012)  Body mass index is 30.65 kg/m².       Physical Exam  Constitutional:       Appearance: Normal appearance. He is obese. He is not ill-appearing.   Eyes:      General: No scleral icterus.  Cardiovascular:      Rate and Rhythm: Normal rate and regular rhythm.      Pulses: Normal pulses.      Heart sounds: Normal heart sounds.   Pulmonary:      Effort: Pulmonary effort is normal. No respiratory distress.      Breath sounds: Normal breath sounds.   Abdominal:      General: Bowel sounds are normal. There is no distension.      Palpations: Abdomen is soft.      " Tenderness: There is abdominal tenderness in the right lower quadrant. There is guarding.   Skin:     General: Skin is warm and dry.      Coloration: Skin is not jaundiced.   Neurological:      Mental Status: He is alert and oriented to person, place, and time.          MELD-Na: 12 at 6/1/2023 11:09 PM  MELD: 12 at 6/1/2023 11:09 PM  Calculated from:  Serum Creatinine: 1.2 mg/dL at 6/1/2023 10:27 PM  Serum Sodium: 138 mmol/L (Using max of 137 mmol/L) at 6/1/2023 10:27 PM  Total Bilirubin: 1.2 mg/dL at 6/1/2023 10:27 PM  INR(ratio): 1.3 at 6/1/2023 11:09 PM      Significant Labs:  Labs within the past month have been reviewed.    Significant Imaging:  CT: Reviewed

## 2023-06-02 NOTE — TRANSFER OF CARE
Anesthesia Transfer of Care Note    Patient: Tej Purdy    Procedure(s) Performed: Procedure(s) (LRB):  APPENDECTOMY, LAPAROSCOPIC (N/A)    Patient location: PACU    Anesthesia Type: general    Transport from OR: Transported from OR on 6-10 L/min O2 by face mask with adequate spontaneous ventilation    Post pain: adequate analgesia    Post assessment: no apparent anesthetic complications    Post vital signs: stable    Level of consciousness: sedated    Nausea/Vomiting: no nausea/vomiting    Complications: none    Transfer of care protocol was followed      Last vitals:   Visit Vitals  /65 (BP Location: Right arm, Patient Position: Lying)   Pulse 90   Temp 37.1 °C (98.8 °F) (Oral)   Resp 20   Ht 6' (1.829 m)   Wt 102.5 kg (226 lb)   SpO2 (!) 93%   BMI 30.65 kg/m²

## 2023-06-02 NOTE — CONSULTS
General Surgery    Reason for Consult: Appendicitis    History of Present Illness:  67 y.o. male comes in w/ RLQ pain x 48 hours w/ associated nausea and emesis.  Says he is still having bowel function, denies any diarrhea.  He has never had similar episodes to this before.  Last took Eliquis on 6/1    Allergies: NKDA  PMHx: DM2, pA fib on Eliquis, hx of cholangiocarcinoma (reason for txp)  PSHx: Liver Transplant, Jan 2022    ROS - Negative except above    Vital Signs (Most Recent)  Temp: 98.6 °F (37 °C) (06/02/23 0100)  Pulse: 82 (06/02/23 0430)  Resp: 14 (06/02/23 0430)  BP: (!) 124/53 (06/02/23 0430)  SpO2: 95 % (06/02/23 0430)    Physical Exam   Constitutional: He is oriented to person, place, and time. He appears well-developed and well-nourished. No distress.   HENT:   Head: Normocephalic and atraumatic.   Eyes: Pupils are equal, round, and reactive to light. Right eye exhibits no discharge. Left eye exhibits no discharge.   Cardiovascular: Normal rate and regular rhythm. Pulmonary:      Effort: Pulmonary effort is normal. No respiratory distress.      Breath sounds: Normal breath sounds.     Abdominal:   Soft, moderately distended  Localized peritonitis in RLQ  LLQ palpation produces severe pain in RLQ   Musculoskeletal:         General: No tenderness or deformity. Normal range of motion.      Cervical back: Normal range of motion and neck supple.   Neurological: He is alert and oriented to person, place, and time.   Skin: Skin is warm and dry. Capillary refill takes less than 2 seconds. No rash noted. He is not diaphoretic.   Psychiatric: His behavior is normal.   Vitals reviewed.     Labs- WBC 26, hgb 11.5, Plt 139, lactate 2.8, T bili 1.2, other LFTs normal    MELD-Na: 12 at 6/1/2023 11:09 PM  MELD: 12 at 6/1/2023 11:09 PM  Calculated from:  Serum Creatinine: 1.2 mg/dL at 6/1/2023 10:27 PM  Serum Sodium: 138 mmol/L (Using max of 137 mmol/L) at 6/1/2023 10:27 PM  Total Bilirubin: 1.2 mg/dL at 6/1/2023  10:27 PM  INR(ratio): 1.3 at 6/1/2023 11:09 PM     Imaging- CT reviewed, dilated appendix with surrounding    Assessment and Plan:  67 y.o. male w/ acute appendicitis    -Will discuss with staff, not very healthy at baseline, but with appendicolith at base of appendix and dilated appendix, likely appendectomy today vs tomorrow    -Zosyn, home meds, holding eliquis, symptom management, Hepatology c/s for immunosuppression management    Juanpablo Campoverde MD  General Surgery, PGY-5  [phone]       I have personally performed a detailed history and physical examination on this patient. My findings are summarized in the resident's note included in the record.   To surgery today for appendectomy

## 2023-06-02 NOTE — PROGRESS NOTES
Spoke with Dr. Wright (gen surg resident) regarding admit order; states he'll put it in. Awaiting admit orders.

## 2023-06-02 NOTE — CLINICAL REVIEW
IP Sepsis Screen (most recent)       Sepsis Screen (IP) - 06/02/23 0905       Is the patient's history or complaint suggestive of a possible infection? Yes  -DD    Are there at least two of the following signs and symptoms present? Yes  -DD    Sepsis signs/symptoms - Tachycardia Tachycardia     >90  -DD    Sepsis signs/symptoms - WBC WBC < 4,000 or WBC > 12,000  -DD    Are any of the following organ dysfunction criteria present and not considered to be due to a chronic condition? Yes  -DD    Organ Dysfunction Criteria Lactate > 2.0  -DD    Organ Dysfunction Criteria - O2 O2 Saturation < 95% on room air  -DD    Initiate Sepsis Protocol No  -DD    Reason sepsis not considered Pt. receiving appropriate management  -DD              User Key  (r) = Recorded By, (t) = Taken By, (c) = Cosigned By      Initials Name    AMARIS Tidwell RN

## 2023-06-02 NOTE — ASSESSMENT & PLAN NOTE
This is a 67 year old male with PMH significant for cholangiocarcinoma (status post liver transplant in 01/2022), aortic stenosis, atrial fibrillation (on Apixaban), and peripheral artery disease (on ASA) who was admitted to [facility] on 06/01 for management of acute appendicitis. He is pending appendectomy today. Hepatology consulted for management of immunosuppression. LFTs normal.     Recommendations:     -Tacrolimus 0.5 mg BID.   -CMP, INR, and Tacrolimus level daily.

## 2023-06-02 NOTE — OP NOTE
Date of procedure - 06/02/2023  Preoperative diagnosis - perforated appendicitis and a liver transplant patient  Postoperative diagnosis - same  Procedure - laparoscopic appendectomy and drainage of intra-abdominal abscess  Surgeon - Cody Saini - Yolanda  Blood loss - minimal  Indications - 67-year-old patient admitted with clinical radiographic evidence of appendicitis nearly a year and a half out from liver transplantation  The operative report in detail - patient brought the operating room placed in supine position prepped draped sterile fashion after satisfactory general anesthesia was induced  Incision was made the base of the umbilicus and a 12 mm balloon trocar was inserted under direct vision into the peritoneal cavity  2 5 mm trocars were placed 1 in the left lower quadrant the other in the suprapubic area  Omentum was draped over the viscera and the right mid abdomen this was retracted medially exposing the cecum with a obviously a perforated appendix at its tip  The appendix was grasped retracted inferiorly and a window was able to be created between the base of the appendix and the mesoappendix  Blue load AMOS stapler was fired across the base of the appendix  White load vascular cartridge was fired across the base of the mesoappendix  Gross purulence was noted in the right upper quadrant and the pelvis  Vigorous irrigation and drainage of the infected material was accomplished until clear  A 15 mm Ankit drain was brought in through a left mid abdominal stab incision that had been used for the trocar site placed in the pelvis in the right lower quadrant  Trocars were removed under direct vision and the umbilical fascial defect was closed with a single figure-of-eight 0 Vicryl suture  All 3 trocar sites were irrigated and closed with a subcuticular stitch  Needle sponge instrument counts were correct

## 2023-06-02 NOTE — ANESTHESIA PROCEDURE NOTES
Intubation    Date/Time: 6/2/2023 11:37 AM  Performed by: Aleena Kendall CRNA  Authorized by: Klarissa Avila MD     Intubation:     Induction:  Intravenous    Intubated:  Postinduction    Mask Ventilation:  Not attempted    Attempts:  1    Attempted By:  CRNA    Method of Intubation:  Video laryngoscopy    Blade:  Mireles 3    Laryngeal View Grade: Grade I - full view of cords      Difficult Airway Encountered?: No      Complications:  None    Airway Device:  Oral endotracheal tube    Airway Device Size:  7.5    Style/Cuff Inflation:  Cuffed    Inflation Amount (mL):  8    Tube secured:  23    Secured at:  The lips    Placement Verified By:  Capnometry    Complicating Factors:  None    Findings Post-Intubation:  BS equal bilateral and atraumatic/condition of teeth unchanged

## 2023-06-02 NOTE — HPI
Mr. Tej Purdy is a 67 year old male for whom hepatology is consulted for immunosuppression management post-liver transplant. He has a PMH significant for cholangiocarcinoma (status post liver transplant in 01/2022), aortic stenosis, atrial fibrillation (on Apixaban), and peripheral artery disease (on ASA).     Hospital Course:   Patient presented to [facility] on 06/01 with approximately two day duration of right lower quadrant abdominal pain associated with nausea and non-bloody vomiting. Presentation notable for normal vital signs on arrival and labs showing leukocytosis (26), elevated INR (1.3), and normal LFT's. CT A/P showed acute appendicitis. He was admitted to general surgery with plans for appendectomy on 06/02. Hepatology consulted for management of immunosuppression.

## 2023-06-02 NOTE — ED TRIAGE NOTES
Pt presents to ED with c/o RLQ abd pain x2 days with multiple episode of N/V. Hx of liver transplant. Denies any complications with transplant. Denies recent fever, chills, CP, SOB, dizziness, diarrhea, dysuria, and diaphoresis

## 2023-06-02 NOTE — NURSING
Patient complained of abdominal paid earlier. I administered oxycodone and on reassessment a hour later he felt much much better. However I then started to strip the DANNIELLE drain to empty it and he screamed in pain while grabbing his lower abdomen. I administered Dialudid PRN however I want to make note that each time I strip the drain his pain intensifies. Communicated to General Surgery via secure chat.

## 2023-06-03 LAB
ALBUMIN SERPL BCP-MCNC: 2.7 G/DL (ref 3.5–5.2)
ALP SERPL-CCNC: 53 U/L (ref 55–135)
ALT SERPL W/O P-5'-P-CCNC: 19 U/L (ref 10–44)
ANION GAP SERPL CALC-SCNC: 10 MMOL/L (ref 8–16)
AST SERPL-CCNC: 19 U/L (ref 10–40)
BASOPHILS # BLD AUTO: 0.01 K/UL (ref 0–0.2)
BASOPHILS NFR BLD: 0.1 % (ref 0–1.9)
BILIRUB SERPL-MCNC: 0.5 MG/DL (ref 0.1–1)
BUN SERPL-MCNC: 26 MG/DL (ref 8–23)
CALCIUM SERPL-MCNC: 8.8 MG/DL (ref 8.7–10.5)
CHLORIDE SERPL-SCNC: 103 MMOL/L (ref 95–110)
CO2 SERPL-SCNC: 23 MMOL/L (ref 23–29)
CREAT SERPL-MCNC: 1.4 MG/DL (ref 0.5–1.4)
DIFFERENTIAL METHOD: ABNORMAL
EOSINOPHIL # BLD AUTO: 0 K/UL (ref 0–0.5)
EOSINOPHIL NFR BLD: 0 % (ref 0–8)
ERYTHROCYTE [DISTWIDTH] IN BLOOD BY AUTOMATED COUNT: 19 % (ref 11.5–14.5)
EST. GFR  (NO RACE VARIABLE): 54.4 ML/MIN/1.73 M^2
GLUCOSE SERPL-MCNC: 167 MG/DL (ref 70–110)
HCT VFR BLD AUTO: 33.1 % (ref 40–54)
HGB BLD-MCNC: 9.2 G/DL (ref 14–18)
IMM GRANULOCYTES # BLD AUTO: 0.07 K/UL (ref 0–0.04)
IMM GRANULOCYTES NFR BLD AUTO: 0.7 % (ref 0–0.5)
LYMPHOCYTES # BLD AUTO: 0.3 K/UL (ref 1–4.8)
LYMPHOCYTES NFR BLD: 2.5 % (ref 18–48)
MAGNESIUM SERPL-MCNC: 1.7 MG/DL (ref 1.6–2.6)
MCH RBC QN AUTO: 20.3 PG (ref 27–31)
MCHC RBC AUTO-ENTMCNC: 27.8 G/DL (ref 32–36)
MCV RBC AUTO: 73 FL (ref 82–98)
MONOCYTES # BLD AUTO: 0.7 K/UL (ref 0.3–1)
MONOCYTES NFR BLD: 6.4 % (ref 4–15)
NEUTROPHILS # BLD AUTO: 9.6 K/UL (ref 1.8–7.7)
NEUTROPHILS NFR BLD: 90.3 % (ref 38–73)
NRBC BLD-RTO: 0 /100 WBC
PHOSPHATE SERPL-MCNC: 3.1 MG/DL (ref 2.7–4.5)
PLATELET # BLD AUTO: 104 K/UL (ref 150–450)
PMV BLD AUTO: ABNORMAL FL (ref 9.2–12.9)
POCT GLUCOSE: 155 MG/DL (ref 70–110)
POCT GLUCOSE: 156 MG/DL (ref 70–110)
POCT GLUCOSE: 162 MG/DL (ref 70–110)
POCT GLUCOSE: 177 MG/DL (ref 70–110)
POCT GLUCOSE: 182 MG/DL (ref 70–110)
POTASSIUM SERPL-SCNC: 3.7 MMOL/L (ref 3.5–5.1)
PROT SERPL-MCNC: 5.9 G/DL (ref 6–8.4)
RBC # BLD AUTO: 4.54 M/UL (ref 4.6–6.2)
SODIUM SERPL-SCNC: 136 MMOL/L (ref 136–145)
TACROLIMUS BLD-MCNC: 5 NG/ML (ref 5–15)
WBC # BLD AUTO: 10.59 K/UL (ref 3.9–12.7)

## 2023-06-03 PROCEDURE — 63600175 PHARM REV CODE 636 W HCPCS: Performed by: STUDENT IN AN ORGANIZED HEALTH CARE EDUCATION/TRAINING PROGRAM

## 2023-06-03 PROCEDURE — 25000003 PHARM REV CODE 250: Performed by: STUDENT IN AN ORGANIZED HEALTH CARE EDUCATION/TRAINING PROGRAM

## 2023-06-03 PROCEDURE — 80053 COMPREHEN METABOLIC PANEL: CPT | Performed by: STUDENT IN AN ORGANIZED HEALTH CARE EDUCATION/TRAINING PROGRAM

## 2023-06-03 PROCEDURE — 83735 ASSAY OF MAGNESIUM: CPT | Performed by: STUDENT IN AN ORGANIZED HEALTH CARE EDUCATION/TRAINING PROGRAM

## 2023-06-03 PROCEDURE — 84100 ASSAY OF PHOSPHORUS: CPT | Performed by: STUDENT IN AN ORGANIZED HEALTH CARE EDUCATION/TRAINING PROGRAM

## 2023-06-03 PROCEDURE — 80197 ASSAY OF TACROLIMUS: CPT | Performed by: STUDENT IN AN ORGANIZED HEALTH CARE EDUCATION/TRAINING PROGRAM

## 2023-06-03 PROCEDURE — 85025 COMPLETE CBC W/AUTO DIFF WBC: CPT | Performed by: STUDENT IN AN ORGANIZED HEALTH CARE EDUCATION/TRAINING PROGRAM

## 2023-06-03 PROCEDURE — 36415 COLL VENOUS BLD VENIPUNCTURE: CPT | Performed by: STUDENT IN AN ORGANIZED HEALTH CARE EDUCATION/TRAINING PROGRAM

## 2023-06-03 PROCEDURE — 11000001 HC ACUTE MED/SURG PRIVATE ROOM

## 2023-06-03 RX ORDER — AMLODIPINE BESYLATE 5 MG/1
10 TABLET ORAL DAILY
Status: DISCONTINUED | OUTPATIENT
Start: 2023-06-03 | End: 2023-06-08 | Stop reason: HOSPADM

## 2023-06-03 RX ORDER — ASPIRIN 81 MG/1
81 TABLET ORAL DAILY
Status: DISCONTINUED | OUTPATIENT
Start: 2023-06-03 | End: 2023-06-08 | Stop reason: HOSPADM

## 2023-06-03 RX ORDER — ATORVASTATIN CALCIUM 20 MG/1
20 TABLET, FILM COATED ORAL NIGHTLY
Status: DISCONTINUED | OUTPATIENT
Start: 2023-06-03 | End: 2023-06-08 | Stop reason: HOSPADM

## 2023-06-03 RX ORDER — INSULIN ASPART 100 [IU]/ML
0-5 INJECTION, SOLUTION INTRAVENOUS; SUBCUTANEOUS
Status: DISCONTINUED | OUTPATIENT
Start: 2023-06-03 | End: 2023-06-08 | Stop reason: HOSPADM

## 2023-06-03 RX ORDER — HYDRALAZINE HYDROCHLORIDE 20 MG/ML
10 INJECTION INTRAMUSCULAR; INTRAVENOUS EVERY 6 HOURS PRN
Status: DISCONTINUED | OUTPATIENT
Start: 2023-06-03 | End: 2023-06-05

## 2023-06-03 RX ORDER — AMLODIPINE BESYLATE 10 MG/1
10 TABLET ORAL DAILY
Status: ON HOLD | COMMUNITY
End: 2024-01-02 | Stop reason: HOSPADM

## 2023-06-03 RX ORDER — SERTRALINE HYDROCHLORIDE 25 MG/1
25 TABLET, FILM COATED ORAL DAILY
Status: DISCONTINUED | OUTPATIENT
Start: 2023-06-03 | End: 2023-06-08 | Stop reason: HOSPADM

## 2023-06-03 RX ADMIN — ASPIRIN 81 MG: 81 TABLET, COATED ORAL at 01:06

## 2023-06-03 RX ADMIN — PIPERACILLIN AND TAZOBACTAM 4.5 G: 4; .5 INJECTION, POWDER, LYOPHILIZED, FOR SOLUTION INTRAVENOUS; PARENTERAL at 01:06

## 2023-06-03 RX ADMIN — HYDROMORPHONE HYDROCHLORIDE 0.5 MG: 1 INJECTION, SOLUTION INTRAMUSCULAR; INTRAVENOUS; SUBCUTANEOUS at 07:06

## 2023-06-03 RX ADMIN — OXYCODONE HYDROCHLORIDE 10 MG: 10 TABLET ORAL at 09:06

## 2023-06-03 RX ADMIN — TACROLIMUS 0.5 MG: 0.5 CAPSULE ORAL at 08:06

## 2023-06-03 RX ADMIN — PIPERACILLIN AND TAZOBACTAM 4.5 G: 4; .5 INJECTION, POWDER, LYOPHILIZED, FOR SOLUTION INTRAVENOUS; PARENTERAL at 09:06

## 2023-06-03 RX ADMIN — ACETAMINOPHEN 1000 MG: 500 TABLET ORAL at 09:06

## 2023-06-03 RX ADMIN — ACETAMINOPHEN 1000 MG: 500 TABLET ORAL at 05:06

## 2023-06-03 RX ADMIN — SERTRALINE HYDROCHLORIDE 25 MG: 25 TABLET ORAL at 01:06

## 2023-06-03 RX ADMIN — ATORVASTATIN CALCIUM 20 MG: 20 TABLET, FILM COATED ORAL at 09:06

## 2023-06-03 RX ADMIN — ACETAMINOPHEN 1000 MG: 500 TABLET ORAL at 01:06

## 2023-06-03 RX ADMIN — OXYCODONE HYDROCHLORIDE 10 MG: 10 TABLET ORAL at 03:06

## 2023-06-03 RX ADMIN — AMLODIPINE BESYLATE 10 MG: 5 TABLET ORAL at 03:06

## 2023-06-03 RX ADMIN — HYDROMORPHONE HYDROCHLORIDE 0.5 MG: 1 INJECTION, SOLUTION INTRAMUSCULAR; INTRAVENOUS; SUBCUTANEOUS at 12:06

## 2023-06-03 RX ADMIN — OXYCODONE HYDROCHLORIDE 10 MG: 10 TABLET ORAL at 05:06

## 2023-06-03 RX ADMIN — TACROLIMUS 0.5 MG: 0.5 CAPSULE ORAL at 05:06

## 2023-06-03 RX ADMIN — PIPERACILLIN AND TAZOBACTAM 4.5 G: 4; .5 INJECTION, POWDER, LYOPHILIZED, FOR SOLUTION INTRAVENOUS; PARENTERAL at 05:06

## 2023-06-03 RX ADMIN — AMIODARONE HYDROCHLORIDE 200 MG: 200 TABLET ORAL at 08:06

## 2023-06-03 NOTE — PLAN OF CARE
Problem: Adult Inpatient Plan of Care  Goal: Plan of Care Review  Outcome: Ongoing, Progressing  Goal: Patient-Specific Goal (Individualized)  Outcome: Met  Goal: Absence of Hospital-Acquired Illness or Injury  Outcome: Ongoing, Progressing  Goal: Optimal Comfort and Wellbeing  Outcome: Ongoing, Progressing  Goal: Readiness for Transition of Care  Outcome: Ongoing, Progressing     Problem: Adult Inpatient Plan of Care  Goal: Plan of Care Review  Outcome: Ongoing, Progressing     Problem: Adult Inpatient Plan of Care  Goal: Patient-Specific Goal (Individualized)  Outcome: Met     Problem: Adult Inpatient Plan of Care  Goal: Absence of Hospital-Acquired Illness or Injury  Outcome: Ongoing, Progressing     Problem: Adult Inpatient Plan of Care  Goal: Optimal Comfort and Wellbeing  Outcome: Ongoing, Progressing     Problem: Adult Inpatient Plan of Care  Goal: Readiness for Transition of Care  Outcome: Ongoing, Progressing     Problem: Diabetes Comorbidity  Goal: Blood Glucose Level Within Targeted Range  Outcome: Ongoing, Progressing  Intervention: Monitor and Manage Glycemia  Flowsheets (Taken 6/3/2023 0430)  Glycemic Management:   blood glucose monitored   supplemental insulin given     Problem: Diabetes Comorbidity  Goal: Blood Glucose Level Within Targeted Range  Outcome: Ongoing, Progressing  Intervention: Monitor and Manage Glycemia  Flowsheets (Taken 6/3/2023 0430)  Glycemic Management:   blood glucose monitored   supplemental insulin given      PRN medication effective for pain . Explained plan of care, verbalized understanding. Educated pt .on new medicine . No injury during shift, Side rails up x 2, call light by bedside.   Ambulated in room

## 2023-06-03 NOTE — TREATMENT PLAN
Hepatology Treatment Plan    Tej Purdy is a 67 y.o. male admitted to hospital 6/1/2023 (Hospital Day: 3) due to Acute appendicitis.     Interval History  Patient status post appendectomy yesterday showing perforated appendix with intra-abdominal abscess; abdominal drain placed. Vital signs stable on room air. Labs notable for resolving leukocytosis, normal LFT's, and Tacrolimus level 5.     Objective  Temp:  [97.7 °F (36.5 °C)-98.8 °F (37.1 °C)] 98.4 °F (36.9 °C) (06/03 0743)  Pulse:  [76-84] 77 (06/03 0743)  BP: ()/(50-72) 166/72 (06/03 0743)  Resp:  [13-20] 18 (06/03 0932)  SpO2:  [91 %-100 %] 94 % (06/03 0743)    Laboratory    Lab Results   Component Value Date    WBC 10.59 06/03/2023    HGB 9.2 (L) 06/03/2023    HCT 33.1 (L) 06/03/2023    MCV 73 (L) 06/03/2023     (L) 06/03/2023       Lab Results   Component Value Date     06/03/2023    K 3.7 06/03/2023     06/03/2023    CO2 23 06/03/2023    BUN 26 (H) 06/03/2023    CREATININE 1.4 06/03/2023    CALCIUM 8.8 06/03/2023       Lab Results   Component Value Date    ALBUMIN 2.7 (L) 06/03/2023    ALT 19 06/03/2023    AST 19 06/03/2023    GGT 48 02/25/2021    ALKPHOS 53 (L) 06/03/2023    BILITOT 0.5 06/03/2023       Lab Results   Component Value Date    INR 1.3 (H) 06/01/2023    INR 1.2 02/01/2022    INR 1.2 01/13/2022       MELD-Na: 14 at 6/3/2023  4:18 AM  MELD: 13 at 6/3/2023  4:18 AM  Calculated from:  Serum Creatinine: 1.4 mg/dL at 6/3/2023  4:18 AM  Serum Sodium: 136 mmol/L at 6/3/2023  4:18 AM  Total Bilirubin: 0.5 mg/dL (Using min of 1 mg/dL) at 6/3/2023  4:18 AM  INR(ratio): 1.3 at 6/1/2023 11:09 PM      Assessment  This is a 67 year old male with PMH significant for cholangiocarcinoma (status post liver transplant in 01/2022), aortic stenosis, atrial fibrillation (on Apixaban), and peripheral artery disease (on ASA) who was admitted to [facility] on 06/01 for management of acute appendicitis. He is status post appendectomy on 06/02  showing perforated appendix with intra-abdominal abscess; abdominal drain placed.Hepatology consulted for management of immunosuppression. LFTs normal.      Recommendations:      -Continue Zosyn and follow-up blood cultures from admission.   -Tacrolimus 0.5 mg BID.   -CMP, INR, and Tacrolimus level daily.     Thank you for involving us in the care of Tej Purdy. Please call with any additional concerns or questions.        Mamadou Lyn MD, PGY-V  Gastroenterology Fellow  [facility]

## 2023-06-03 NOTE — ASSESSMENT & PLAN NOTE
68 yo M with history of liver transplant with perforated appendicitis s/p laparoscopic appendectomy and drainage of intra-abdominal abscess.    - regular diet  - d/c irwin  - PO meds  - MM pain   - PT/OT  - labs  - Abx  - Hepatology following for liver transplant.

## 2023-06-03 NOTE — SUBJECTIVE & OBJECTIVE
Interval History: NAEON. Tolerating clears without nausea, vomiting. Pain controlled. No flatus.     Medications:  Continuous Infusions:  Scheduled Meds:   acetaminophen  1,000 mg Oral Q8H    amiodarone  200 mg Oral Daily    piperacillin-tazobactam (ZOSYN) IVPB  4.5 g Intravenous Q8H    tacrolimus  0.5 mg Oral BID     PRN Meds:acetaminophen, dextrose 10%, dextrose 10%, glucagon (human recombinant), HYDROmorphone, insulin aspart U-100, LIDOcaine (PF) 10 mg/ml (1%), melatonin, ondansetron, oxyCODONE, oxyCODONE, sodium chloride 0.9%     Review of patient's allergies indicates:   Allergen Reactions    Bee pollens Swelling     BEE STINGS swells body     Objective:     Vital Signs (Most Recent):  Temp: 98.4 °F (36.9 °C) (06/03/23 0743)  Pulse: 77 (06/03/23 0743)  Resp: 18 (06/03/23 0932)  BP: (!) 166/72 (06/03/23 0743)  SpO2: (!) 94 % (06/03/23 0743) Vital Signs (24h Range):  Temp:  [97.7 °F (36.5 °C)-98.8 °F (37.1 °C)] 98.4 °F (36.9 °C)  Pulse:  [76-84] 77  Resp:  [13-20] 18  SpO2:  [91 %-100 %] 94 %  BP: ()/(50-72) 166/72     Weight: 102.5 kg (226 lb)  Body mass index is 30.65 kg/m².    Intake/Output - Last 3 Shifts         06/01 0700  06/02 0659 06/02 0700  06/03 0659 06/03 0700  06/04 0659    P.O.   150    IV Piggyback 1050 1250     Total Intake(mL/kg) 1050 (10.2) 1250 (12.2) 150 (1.5)    Urine (mL/kg/hr)  1025 (0.4) 250 (0.7)    Emesis/NG output  150     Drains  270 30    Total Output  1445 280    Net +1050 -195 -130                    Physical Exam  Vitals reviewed.   Constitutional:       Appearance: He is obese.   HENT:      Head: Normocephalic and atraumatic.   Cardiovascular:      Rate and Rhythm: Normal rate and regular rhythm.      Pulses: Normal pulses.      Heart sounds: Normal heart sounds.   Pulmonary:      Effort: Pulmonary effort is normal.      Breath sounds: Normal breath sounds.   Abdominal:      General: Abdomen is flat. Bowel sounds are normal.      Palpations: Abdomen is soft.      Comments:  Incision CDI  DANNIELLE drain seropurulent   Genitourinary:     Comments: [place]  Skin:     General: Skin is warm and dry.   Neurological:      General: No focal deficit present.      Mental Status: He is alert and oriented to person, place, and time.   Psychiatric:         Thought Content: Thought content normal.        Significant Labs:  I have reviewed all pertinent lab results within the past 24 hours.  CBC:   Recent Labs   Lab 06/03/23 0418   WBC 10.59   RBC 4.54*   HGB 9.2*   HCT 33.1*   *   MCV 73*   MCH 20.3*   MCHC 27.8*     CMP:   Recent Labs   Lab 06/03/23 0418   *   CALCIUM 8.8   ALBUMIN 2.7*   PROT 5.9*      K 3.7   CO2 23      BUN 26*   CREATININE 1.4   ALKPHOS 53*   ALT 19   AST 19   BILITOT 0.5       Significant Diagnostics:  I have reviewed all pertinent imaging results/findings within the past 24 hours.

## 2023-06-03 NOTE — PLAN OF CARE
[facility]  Initial Discharge Assessment       Primary Care Provider: Thais Maxwell MD    Admission Diagnosis: Abdominal pain [R10.9]  Acute appendicitis, unspecified acute appendicitis type [K35.80]    Admission Date: 6/1/2023  Expected Discharge Date:     Transition of Care Barriers: None    Payor: [employer] MEDICARE / Plan: [employer] / Product Type: Medicare Advantage /     Extended Emergency Contact Information  Primary Emergency Contact: Aniya Berkowitz  Mobile Phone: [phone]  Relation: Significant other    Discharge Plan A: Home with family  Discharge Plan B: Home with family      [id number] - [place], [place] - [address]  [address]  [place] [address]  Phone: [phone] Fax: [phone]    [facility]  [address] [place] [address]  Phone: [phone] Fax: [phone]    [facility]  [address] [place] [address]  Phone: [phone] Fax: [phone]    CM at bedside to discuss discharge planning assessment.   Patient lives with significant other in a two-story home.  Independent with ADL and and  has a shower chair and glucometer.   Explained CM services during patient's stay in hospital.   My Health packet discussed and left with patient.     Initial Assessment (most recent)       Adult Discharge Assessment - 06/03/23 1355          Discharge Assessment    Assessment Type Discharge Planning Assessment     Confirmed/corrected address, phone number and insurance Yes     Confirmed Demographics Correct on Facesheet     Source of Information patient     Reason For Admission Abdominal pain     People in Home spouse     Facility Arrived From: home     Do you have help at home or someone to help you manage your care at home? Yes     Who are your caregiver(s) and their phone number(s)? Aniya Berkowitz (s/o) [phone]     Prior to hospitilization cognitive status: Alert/Oriented      Current cognitive status: Alert/Oriented     Home Accessibility stairs within home     Equipment Currently Used at Home shower chair;glucometer;cane, straight     Readmission within 30 days? No     Patient currently being followed by outpatient case management? No     Do you currently have service(s) that help you manage your care at home? No     Do you take prescription medications? Yes     Do you have prescription coverage? Yes     [facility]     Do you have any problems affording any of your prescribed medications? No     Is the patient taking medications as prescribed? yes     Who is going to help you get home at discharge? Aniya Berkowitz (s/o) [phone]     How do you get to doctors appointments? family or friend will provide     Are you on dialysis? No     Do you take coumadin? No     Discharge Plan A Home with family     Discharge Plan B Home with family     DME Needed Upon Discharge  --   tbd    Discharge Plan discussed with: Patient;Spouse/sig other     Transition of Care Barriers None        Physical Activity    On average, how many days per week do you engage in moderate to strenuous exercise (like a brisk walk)? 1 day     On average, how many minutes do you engage in exercise at this level? 30 min        Financial Resource Strain    How hard is it for you to pay for the very basics like food, housing, medical care, and heating? Not very hard        Housing Stability    In the last 12 months, was there a time when you were not able to pay the mortgage or rent on time? No     In the last 12 months, how many places have you lived? 1     In the last 12 months, was there a time when you did not have a steady place to sleep or slept in a shelter (including now)? No        Transportation Needs    In the past 12 months, has lack of transportation kept you from medical appointments or from getting medications? No     In the past 12 months, has lack of transportation kept you from meetings, work, or  from getting things needed for daily living? No        Food Insecurity    Within the past 12 months, you worried that your food would run out before you got the money to buy more. Never true     Within the past 12 months, the food you bought just didn't last and you didn't have money to get more. Never true        Stress    Do you feel stress - tense, restless, nervous, or anxious, or unable to sleep at night because your mind is troubled all the time - these days? To some extent        Social Connections    In a typical week, how many times do you talk on the phone with family, friends, or neighbors? More than three times a week     How often do you get together with friends or relatives? More than three times a week     How often do you attend Jewish or Bahai services? Never     Do you belong to any clubs or organizations such as Jewish groups, unions, fraternal or athletic groups, or school groups? Yes     How often do you attend meetings of the clubs or organizations you belong to? 1 to 4 times per year     Are you , , , , never , or living with a partner?         Alcohol Use    Q1: How often do you have a drink containing alcohol? Never     Q2: How many drinks containing alcohol do you have on a typical day when you are drinking? Patient does not drink     Q3: How often do you have six or more drinks on one occasion? Never        OTHER    Name(s) of People in Home Aniya Berkowitz (s/o) [phone]

## 2023-06-03 NOTE — PROGRESS NOTES
[facility]  General Surgery  Progress Note    Subjective:     History of Present Illness:  No notes on file    Post-Op Info:  Procedure(s) (LRB):  APPENDECTOMY, LAPAROSCOPIC (N/A)   1 Day Post-Op     Interval History: NAEON. Tolerating clears without nausea, vomiting. Pain controlled. No flatus.     Medications:  Continuous Infusions:  Scheduled Meds:   acetaminophen  1,000 mg Oral Q8H    amiodarone  200 mg Oral Daily    piperacillin-tazobactam (ZOSYN) IVPB  4.5 g Intravenous Q8H    tacrolimus  0.5 mg Oral BID     PRN Meds:acetaminophen, dextrose 10%, dextrose 10%, glucagon (human recombinant), HYDROmorphone, insulin aspart U-100, LIDOcaine (PF) 10 mg/ml (1%), melatonin, ondansetron, oxyCODONE, oxyCODONE, sodium chloride 0.9%     Review of patient's allergies indicates:   Allergen Reactions    Bee pollens Swelling     BEE STINGS swells body     Objective:     Vital Signs (Most Recent):  Temp: 98.4 °F (36.9 °C) (06/03/23 0743)  Pulse: 77 (06/03/23 0743)  Resp: 18 (06/03/23 0932)  BP: (!) 166/72 (06/03/23 0743)  SpO2: (!) 94 % (06/03/23 0743) Vital Signs (24h Range):  Temp:  [97.7 °F (36.5 °C)-98.8 °F (37.1 °C)] 98.4 °F (36.9 °C)  Pulse:  [76-84] 77  Resp:  [13-20] 18  SpO2:  [91 %-100 %] 94 %  BP: ()/(50-72) 166/72     Weight: 102.5 kg (226 lb)  Body mass index is 30.65 kg/m².    Intake/Output - Last 3 Shifts         06/01 0700  06/02 0659 06/02 0700 06/03 0659 06/03 0700  06/04 0659    P.O.   150    IV Piggyback 1050 1250     Total Intake(mL/kg) 1050 (10.2) 1250 (12.2) 150 (1.5)    Urine (mL/kg/hr)  1025 (0.4) 250 (0.7)    Emesis/NG output  150     Drains  270 30    Total Output  1445 280    Net +1050 -195 -130                    Physical Exam  Vitals reviewed.   Constitutional:       Appearance: He is obese.   HENT:      Head: Normocephalic and atraumatic.   Cardiovascular:      Rate and Rhythm: Normal rate and regular rhythm.      Pulses: Normal pulses.      Heart sounds: Normal heart sounds.    Pulmonary:      Effort: Pulmonary effort is normal.      Breath sounds: Normal breath sounds.   Abdominal:      General: Abdomen is flat. Bowel sounds are normal.      Palpations: Abdomen is soft.      Comments: Incision CDI  DANNIELLE drain seropurulent   Genitourinary:     Comments: Irwin  Skin:     General: Skin is warm and dry.   Neurological:      General: No focal deficit present.      Mental Status: He is alert and oriented to person, place, and time.   Psychiatric:         Thought Content: Thought content normal.        Significant Labs:  I have reviewed all pertinent lab results within the past 24 hours.  CBC:   Recent Labs   Lab 06/03/23 0418   WBC 10.59   RBC 4.54*   HGB 9.2*   HCT 33.1*   *   MCV 73*   MCH 20.3*   MCHC 27.8*     CMP:   Recent Labs   Lab 06/03/23 0418   *   CALCIUM 8.8   ALBUMIN 2.7*   PROT 5.9*      K 3.7   CO2 23      BUN 26*   CREATININE 1.4   ALKPHOS 53*   ALT 19   AST 19   BILITOT 0.5       Significant Diagnostics:  I have reviewed all pertinent imaging results/findings within the past 24 hours.    Assessment/Plan:     * Acute appendicitis  68 yo M with history of liver transplant with perforated appendicitis s/p laparoscopic appendectomy and drainage of intra-abdominal abscess.    - regular diet  - d/c irwin  - PO meds  - MM pain   - PT/OT  - labs  - Abx  - Hepatology following for liver transplant.          Derik Carroll MD  General Surgery  [facility] - [facility]

## 2023-06-04 LAB
ALBUMIN SERPL BCP-MCNC: 2.8 G/DL (ref 3.5–5.2)
ALP SERPL-CCNC: 62 U/L (ref 55–135)
ALT SERPL W/O P-5'-P-CCNC: 16 U/L (ref 10–44)
ANION GAP SERPL CALC-SCNC: 10 MMOL/L (ref 8–16)
AST SERPL-CCNC: 23 U/L (ref 10–40)
BASOPHILS # BLD AUTO: 0.01 K/UL (ref 0–0.2)
BASOPHILS NFR BLD: 0.1 % (ref 0–1.9)
BILIRUB SERPL-MCNC: 0.5 MG/DL (ref 0.1–1)
BUN SERPL-MCNC: 34 MG/DL (ref 8–23)
CALCIUM SERPL-MCNC: 8.8 MG/DL (ref 8.7–10.5)
CHLORIDE SERPL-SCNC: 103 MMOL/L (ref 95–110)
CO2 SERPL-SCNC: 24 MMOL/L (ref 23–29)
CREAT SERPL-MCNC: 1.3 MG/DL (ref 0.5–1.4)
DIFFERENTIAL METHOD: ABNORMAL
EOSINOPHIL # BLD AUTO: 0 K/UL (ref 0–0.5)
EOSINOPHIL NFR BLD: 0.3 % (ref 0–8)
ERYTHROCYTE [DISTWIDTH] IN BLOOD BY AUTOMATED COUNT: 18.6 % (ref 11.5–14.5)
EST. GFR  (NO RACE VARIABLE): 59.5 ML/MIN/1.73 M^2
GLUCOSE SERPL-MCNC: 146 MG/DL (ref 70–110)
HCT VFR BLD AUTO: 34.1 % (ref 40–54)
HGB BLD-MCNC: 10.1 G/DL (ref 14–18)
IMM GRANULOCYTES # BLD AUTO: 0.1 K/UL (ref 0–0.04)
IMM GRANULOCYTES NFR BLD AUTO: 0.9 % (ref 0–0.5)
LYMPHOCYTES # BLD AUTO: 0.4 K/UL (ref 1–4.8)
LYMPHOCYTES NFR BLD: 3.8 % (ref 18–48)
MAGNESIUM SERPL-MCNC: 1.7 MG/DL (ref 1.6–2.6)
MCH RBC QN AUTO: 20.7 PG (ref 27–31)
MCHC RBC AUTO-ENTMCNC: 29.6 G/DL (ref 32–36)
MCV RBC AUTO: 70 FL (ref 82–98)
MONOCYTES # BLD AUTO: 0.8 K/UL (ref 0.3–1)
MONOCYTES NFR BLD: 7.1 % (ref 4–15)
NEUTROPHILS # BLD AUTO: 10.1 K/UL (ref 1.8–7.7)
NEUTROPHILS NFR BLD: 87.8 % (ref 38–73)
NRBC BLD-RTO: 0 /100 WBC
PHOSPHATE SERPL-MCNC: 2.7 MG/DL (ref 2.7–4.5)
PLATELET # BLD AUTO: 120 K/UL (ref 150–450)
PMV BLD AUTO: ABNORMAL FL (ref 9.2–12.9)
POCT GLUCOSE: 155 MG/DL (ref 70–110)
POCT GLUCOSE: 158 MG/DL (ref 70–110)
POCT GLUCOSE: 167 MG/DL (ref 70–110)
POTASSIUM SERPL-SCNC: 3.9 MMOL/L (ref 3.5–5.1)
PROT SERPL-MCNC: 6.2 G/DL (ref 6–8.4)
RBC # BLD AUTO: 4.88 M/UL (ref 4.6–6.2)
SODIUM SERPL-SCNC: 137 MMOL/L (ref 136–145)
TACROLIMUS BLD-MCNC: 6.4 NG/ML (ref 5–15)
WBC # BLD AUTO: 11.44 K/UL (ref 3.9–12.7)

## 2023-06-04 PROCEDURE — 63600175 PHARM REV CODE 636 W HCPCS: Performed by: STUDENT IN AN ORGANIZED HEALTH CARE EDUCATION/TRAINING PROGRAM

## 2023-06-04 PROCEDURE — 11000001 HC ACUTE MED/SURG PRIVATE ROOM

## 2023-06-04 PROCEDURE — 36415 COLL VENOUS BLD VENIPUNCTURE: CPT | Performed by: STUDENT IN AN ORGANIZED HEALTH CARE EDUCATION/TRAINING PROGRAM

## 2023-06-04 PROCEDURE — 83735 ASSAY OF MAGNESIUM: CPT | Performed by: STUDENT IN AN ORGANIZED HEALTH CARE EDUCATION/TRAINING PROGRAM

## 2023-06-04 PROCEDURE — 85025 COMPLETE CBC W/AUTO DIFF WBC: CPT | Performed by: STUDENT IN AN ORGANIZED HEALTH CARE EDUCATION/TRAINING PROGRAM

## 2023-06-04 PROCEDURE — 25000003 PHARM REV CODE 250: Performed by: STUDENT IN AN ORGANIZED HEALTH CARE EDUCATION/TRAINING PROGRAM

## 2023-06-04 PROCEDURE — 84100 ASSAY OF PHOSPHORUS: CPT | Performed by: STUDENT IN AN ORGANIZED HEALTH CARE EDUCATION/TRAINING PROGRAM

## 2023-06-04 PROCEDURE — 80197 ASSAY OF TACROLIMUS: CPT | Performed by: STUDENT IN AN ORGANIZED HEALTH CARE EDUCATION/TRAINING PROGRAM

## 2023-06-04 PROCEDURE — 80053 COMPREHEN METABOLIC PANEL: CPT | Performed by: STUDENT IN AN ORGANIZED HEALTH CARE EDUCATION/TRAINING PROGRAM

## 2023-06-04 RX ADMIN — TACROLIMUS 0.5 MG: 0.5 CAPSULE ORAL at 08:06

## 2023-06-04 RX ADMIN — PIPERACILLIN AND TAZOBACTAM 4.5 G: 4; .5 INJECTION, POWDER, LYOPHILIZED, FOR SOLUTION INTRAVENOUS; PARENTERAL at 11:06

## 2023-06-04 RX ADMIN — OXYCODONE HYDROCHLORIDE 5 MG: 5 TABLET ORAL at 01:06

## 2023-06-04 RX ADMIN — APIXABAN 5 MG: 5 TABLET, FILM COATED ORAL at 11:06

## 2023-06-04 RX ADMIN — OXYCODONE HYDROCHLORIDE 10 MG: 10 TABLET ORAL at 08:06

## 2023-06-04 RX ADMIN — OXYCODONE HYDROCHLORIDE 5 MG: 5 TABLET ORAL at 05:06

## 2023-06-04 RX ADMIN — INSULIN ASPART 0 UNITS: 100 INJECTION, SOLUTION INTRAVENOUS; SUBCUTANEOUS at 08:06

## 2023-06-04 RX ADMIN — ASPIRIN 81 MG: 81 TABLET, COATED ORAL at 08:06

## 2023-06-04 RX ADMIN — AMLODIPINE BESYLATE 10 MG: 5 TABLET ORAL at 08:06

## 2023-06-04 RX ADMIN — ACETAMINOPHEN 1000 MG: 500 TABLET ORAL at 01:06

## 2023-06-04 RX ADMIN — OXYCODONE HYDROCHLORIDE 5 MG: 5 TABLET ORAL at 10:06

## 2023-06-04 RX ADMIN — PIPERACILLIN AND TAZOBACTAM 4.5 G: 4; .5 INJECTION, POWDER, LYOPHILIZED, FOR SOLUTION INTRAVENOUS; PARENTERAL at 05:06

## 2023-06-04 RX ADMIN — PIPERACILLIN AND TAZOBACTAM 4.5 G: 4; .5 INJECTION, POWDER, LYOPHILIZED, FOR SOLUTION INTRAVENOUS; PARENTERAL at 01:06

## 2023-06-04 RX ADMIN — TACROLIMUS 0.5 MG: 0.5 CAPSULE ORAL at 05:06

## 2023-06-04 RX ADMIN — SERTRALINE HYDROCHLORIDE 25 MG: 25 TABLET ORAL at 08:06

## 2023-06-04 RX ADMIN — AMIODARONE HYDROCHLORIDE 200 MG: 200 TABLET ORAL at 08:06

## 2023-06-04 RX ADMIN — ATORVASTATIN CALCIUM 20 MG: 20 TABLET, FILM COATED ORAL at 08:06

## 2023-06-04 RX ADMIN — ACETAMINOPHEN 1000 MG: 500 TABLET ORAL at 05:06

## 2023-06-04 RX ADMIN — APIXABAN 5 MG: 5 TABLET, FILM COATED ORAL at 08:06

## 2023-06-04 RX ADMIN — ACETAMINOPHEN 1000 MG: 500 TABLET ORAL at 09:06

## 2023-06-04 RX ADMIN — OXYCODONE HYDROCHLORIDE 10 MG: 10 TABLET ORAL at 01:06

## 2023-06-04 NOTE — SUBJECTIVE & OBJECTIVE
Interval History: NAEON. Doing better after irwin removed. Denies nausea, vomiting, fever. Passing flatus.    Medications:  Continuous Infusions:  Scheduled Meds:   acetaminophen  1,000 mg Oral Q8H    amiodarone  200 mg Oral Daily    amLODIPine  10 mg Oral Daily    aspirin  81 mg Oral Daily    atorvastatin  20 mg Oral QHS    insulin aspart U-100  0-5 Units Subcutaneous TIDWM    piperacillin-tazobactam (ZOSYN) IVPB  4.5 g Intravenous Q8H    sertraline  25 mg Oral Daily    tacrolimus  0.5 mg Oral BID     PRN Meds:dextrose 10%, dextrose 10%, glucagon (human recombinant), hydrALAZINE, HYDROmorphone, ondansetron, oxyCODONE, oxyCODONE, sodium chloride 0.9%     Review of patient's allergies indicates:   Allergen Reactions    Bee pollens Swelling     BEE STINGS swells body     Objective:     Vital Signs (Most Recent):  Temp: 97.4 °F (36.3 °C) (06/04/23 0827)  Pulse: 73 (06/04/23 0827)  Resp: 15 (06/04/23 0827)  BP: (!) 178/82 (06/04/23 0827)  SpO2: (!) 91 % (06/04/23 0827) Vital Signs (24h Range):  Temp:  [97.4 °F (36.3 °C)-98.5 °F (36.9 °C)] 97.4 °F (36.3 °C)  Pulse:  [65-80] 73  Resp:  [14-18] 15  SpO2:  [90 %-93 %] 91 %  BP: (146-188)/(62-84) 178/82     Weight: 102.5 kg (226 lb)  Body mass index is 30.65 kg/m².    Intake/Output - Last 3 Shifts         06/02 0700  06/03 0659 06/03 0700  06/04 0659 06/04 0700  06/05 0659    P.O.  570     IV Piggyback 1250 425.8     Total Intake(mL/kg) 1250 (12.2) 995.8 (9.7)     Urine (mL/kg/hr) 1025 (0.4) 775 (0.3) 500 (2.8)    Emesis/NG output 150      Drains 270 360 90    Stool  0     Total Output 1445 1135 590    Net -195 -139.2 -590           Urine Occurrence  1 x     Stool Occurrence  0 x              Physical Exam  Vitals reviewed.   Constitutional:       Appearance: He is obese.   HENT:      Head: Normocephalic and atraumatic.   Cardiovascular:      Rate and Rhythm: Normal rate and regular rhythm.      Pulses: Normal pulses.      Heart sounds: Normal heart sounds.   Pulmonary:       Effort: Pulmonary effort is normal.      Breath sounds: Normal breath sounds.   Abdominal:      General: Abdomen is flat. Bowel sounds are normal.      Palpations: Abdomen is soft.      Comments: Incision CDI  DANNIELLE drain with clear output  Skin:     General: Skin is warm and dry.   Neurological:      General: No focal deficit present.      Mental Status: He is alert and oriented to person, place, and time.   Psychiatric:         Thought Content: Thought content normal.      Significant Labs:  I have reviewed all pertinent lab results within the past 24 hours.  CBC:   Recent Labs   Lab 06/04/23  0258   WBC 11.44   RBC 4.88   HGB 10.1*   HCT 34.1*   *   MCV 70*   MCH 20.7*   MCHC 29.6*     CMP:   Recent Labs   Lab 06/04/23  0258   *   CALCIUM 8.8   ALBUMIN 2.8*   PROT 6.2      K 3.9   CO2 24      BUN 34*   CREATININE 1.3   ALKPHOS 62   ALT 16   AST 23   BILITOT 0.5       Significant Diagnostics:  I have reviewed all pertinent imaging results/findings within the past 24 hours.

## 2023-06-04 NOTE — PROGRESS NOTES
[facility]  General Surgery  Progress Note    Subjective:     History of Present Illness:  No notes on file    Post-Op Info:  Procedure(s) (LRB):  APPENDECTOMY, LAPAROSCOPIC (N/A)   2 Days Post-Op     Interval History: NAEON. Doing better after irwin removed. Denies nausea, vomiting, fever. Passing flatus.    Medications:  Continuous Infusions:  Scheduled Meds:   acetaminophen  1,000 mg Oral Q8H    amiodarone  200 mg Oral Daily    amLODIPine  10 mg Oral Daily    aspirin  81 mg Oral Daily    atorvastatin  20 mg Oral QHS    insulin aspart U-100  0-5 Units Subcutaneous TIDWM    piperacillin-tazobactam (ZOSYN) IVPB  4.5 g Intravenous Q8H    sertraline  25 mg Oral Daily    tacrolimus  0.5 mg Oral BID     PRN Meds:dextrose 10%, dextrose 10%, glucagon (human recombinant), hydrALAZINE, HYDROmorphone, ondansetron, oxyCODONE, oxyCODONE, sodium chloride 0.9%     Review of patient's allergies indicates:   Allergen Reactions    Bee pollens Swelling     BEE STINGS swells body     Objective:     Vital Signs (Most Recent):  Temp: 97.4 °F (36.3 °C) (06/04/23 0827)  Pulse: 73 (06/04/23 0827)  Resp: 15 (06/04/23 0827)  BP: (!) 178/82 (06/04/23 0827)  SpO2: (!) 91 % (06/04/23 0827) Vital Signs (24h Range):  Temp:  [97.4 °F (36.3 °C)-98.5 °F (36.9 °C)] 97.4 °F (36.3 °C)  Pulse:  [65-80] 73  Resp:  [14-18] 15  SpO2:  [90 %-93 %] 91 %  BP: (146-188)/(62-84) 178/82     Weight: 102.5 kg (226 lb)  Body mass index is 30.65 kg/m².    Intake/Output - Last 3 Shifts         06/02 0700  06/03 0659 06/03 0700  06/04 0659 06/04 0700  06/05 0659    P.O.  570     IV Piggyback 1250 425.8     Total Intake(mL/kg) 1250 (12.2) 995.8 (9.7)     Urine (mL/kg/hr) 1025 (0.4) 775 (0.3) 500 (2.8)    Emesis/NG output 150      Drains 270 360 90    Stool  0     Total Output 1445 1135 590    Net -195 -139.2 -590           Urine Occurrence  1 x     Stool Occurrence  0 x              Physical Exam  Vitals reviewed.   Constitutional:       Appearance:  He is obese.   HENT:      Head: Normocephalic and atraumatic.   Cardiovascular:      Rate and Rhythm: Normal rate and regular rhythm.      Pulses: Normal pulses.      Heart sounds: Normal heart sounds.   Pulmonary:      Effort: Pulmonary effort is normal.      Breath sounds: Normal breath sounds.   Abdominal:      General: Abdomen is flat. Bowel sounds are normal.      Palpations: Abdomen is soft.      Comments: Incision CDI  DANNIELLE drain with clear output  Skin:     General: Skin is warm and dry.   Neurological:      General: No focal deficit present.      Mental Status: He is alert and oriented to person, place, and time.   Psychiatric:         Thought Content: Thought content normal.      Significant Labs:  I have reviewed all pertinent lab results within the past 24 hours.  CBC:   Recent Labs   Lab 06/04/23  0258   WBC 11.44   RBC 4.88   HGB 10.1*   HCT 34.1*   *   MCV 70*   MCH 20.7*   MCHC 29.6*     CMP:   Recent Labs   Lab 06/04/23  0258   *   CALCIUM 8.8   ALBUMIN 2.8*   PROT 6.2      K 3.9   CO2 24      BUN 34*   CREATININE 1.3   ALKPHOS 62   ALT 16   AST 23   BILITOT 0.5       Significant Diagnostics:  I have reviewed all pertinent imaging results/findings within the past 24 hours.    Assessment/Plan:     * Acute appendicitis  66 yo M with history of liver transplant with perforated appendicitis s/p laparoscopic appendectomy and drainage of intra-abdominal abscess.    - regular diet  - D/C fluids  - PO meds  - MM pain   - PT/OT  - labs  - Abx  - Hepatology following for liver transplant.          Derik Carroll MD  General Surgery  [facility] - Surgery

## 2023-06-04 NOTE — ASSESSMENT & PLAN NOTE
68 yo M with history of liver transplant with perforated appendicitis s/p laparoscopic appendectomy and drainage of intra-abdominal abscess.    - regular diet  - D/C fluids  - PO meds  - MM pain   - PT/OT  - labs  - Abx  - Hepatology following for liver transplant.

## 2023-06-04 NOTE — PLAN OF CARE
Pt AAOx4. PRN pain medication administered. IV abx administered. No s/s of infection. DANNIELLE drain intact with leaking at the site. New dressing applied. Drain output document in chart. No BM this shift, but pt passing gas. Blood glucose monitored ACHS.     Problem: Adult Inpatient Plan of Care  Goal: Plan of Care Review  Outcome: Ongoing, Progressing     Problem: Diabetes Comorbidity  Goal: Blood Glucose Level Within Targeted Range  Outcome: Ongoing, Progressing     Problem: Infection  Goal: Absence of Infection Signs and Symptoms  Outcome: Ongoing, Progressing     Problem: Fall Injury Risk  Goal: Absence of Fall and Fall-Related Injury  Outcome: Ongoing, Progressing

## 2023-06-05 LAB
ALBUMIN SERPL BCP-MCNC: 2.6 G/DL (ref 3.5–5.2)
ALP SERPL-CCNC: 61 U/L (ref 55–135)
ALT SERPL W/O P-5'-P-CCNC: 13 U/L (ref 10–44)
ANION GAP SERPL CALC-SCNC: 10 MMOL/L (ref 8–16)
AST SERPL-CCNC: 16 U/L (ref 10–40)
BASOPHILS # BLD AUTO: 0.04 K/UL (ref 0–0.2)
BASOPHILS NFR BLD: 0.3 % (ref 0–1.9)
BILIRUB SERPL-MCNC: 0.8 MG/DL (ref 0.1–1)
BUN SERPL-MCNC: 23 MG/DL (ref 8–23)
CALCIUM SERPL-MCNC: 8.8 MG/DL (ref 8.7–10.5)
CHLORIDE SERPL-SCNC: 102 MMOL/L (ref 95–110)
CO2 SERPL-SCNC: 27 MMOL/L (ref 23–29)
CREAT SERPL-MCNC: 0.8 MG/DL (ref 0.5–1.4)
DIFFERENTIAL METHOD: ABNORMAL
EOSINOPHIL # BLD AUTO: 0.1 K/UL (ref 0–0.5)
EOSINOPHIL NFR BLD: 0.6 % (ref 0–8)
ERYTHROCYTE [DISTWIDTH] IN BLOOD BY AUTOMATED COUNT: 19.4 % (ref 11.5–14.5)
EST. GFR  (NO RACE VARIABLE): >60 ML/MIN/1.73 M^2
GLUCOSE SERPL-MCNC: 170 MG/DL (ref 70–110)
HCT VFR BLD AUTO: 39.9 % (ref 40–54)
HGB BLD-MCNC: 11.7 G/DL (ref 14–18)
IMM GRANULOCYTES # BLD AUTO: 0.21 K/UL (ref 0–0.04)
IMM GRANULOCYTES NFR BLD AUTO: 1.6 % (ref 0–0.5)
LYMPHOCYTES # BLD AUTO: 0.7 K/UL (ref 1–4.8)
LYMPHOCYTES NFR BLD: 5.1 % (ref 18–48)
MAGNESIUM SERPL-MCNC: 1.4 MG/DL (ref 1.6–2.6)
MCH RBC QN AUTO: 21 PG (ref 27–31)
MCHC RBC AUTO-ENTMCNC: 29.3 G/DL (ref 32–36)
MCV RBC AUTO: 72 FL (ref 82–98)
MONOCYTES # BLD AUTO: 1.5 K/UL (ref 0.3–1)
MONOCYTES NFR BLD: 11.2 % (ref 4–15)
NEUTROPHILS # BLD AUTO: 10.9 K/UL (ref 1.8–7.7)
NEUTROPHILS NFR BLD: 81.2 % (ref 38–73)
NRBC BLD-RTO: 0 /100 WBC
PHOSPHATE SERPL-MCNC: 2.5 MG/DL (ref 2.7–4.5)
PLATELET # BLD AUTO: 163 K/UL (ref 150–450)
PMV BLD AUTO: ABNORMAL FL (ref 9.2–12.9)
POCT GLUCOSE: 136 MG/DL (ref 70–110)
POCT GLUCOSE: 146 MG/DL (ref 70–110)
POCT GLUCOSE: 146 MG/DL (ref 70–110)
POCT GLUCOSE: 150 MG/DL (ref 70–110)
POCT GLUCOSE: 165 MG/DL (ref 70–110)
POTASSIUM SERPL-SCNC: 3.3 MMOL/L (ref 3.5–5.1)
PROT SERPL-MCNC: 5.8 G/DL (ref 6–8.4)
RBC # BLD AUTO: 5.56 M/UL (ref 4.6–6.2)
SODIUM SERPL-SCNC: 139 MMOL/L (ref 136–145)
TACROLIMUS BLD-MCNC: 6.6 NG/ML (ref 5–15)
WBC # BLD AUTO: 13.38 K/UL (ref 3.9–12.7)

## 2023-06-05 PROCEDURE — 84100 ASSAY OF PHOSPHORUS: CPT | Performed by: STUDENT IN AN ORGANIZED HEALTH CARE EDUCATION/TRAINING PROGRAM

## 2023-06-05 PROCEDURE — 25000003 PHARM REV CODE 250

## 2023-06-05 PROCEDURE — 80053 COMPREHEN METABOLIC PANEL: CPT | Performed by: STUDENT IN AN ORGANIZED HEALTH CARE EDUCATION/TRAINING PROGRAM

## 2023-06-05 PROCEDURE — 63600175 PHARM REV CODE 636 W HCPCS: Performed by: STUDENT IN AN ORGANIZED HEALTH CARE EDUCATION/TRAINING PROGRAM

## 2023-06-05 PROCEDURE — 25000003 PHARM REV CODE 250: Performed by: STUDENT IN AN ORGANIZED HEALTH CARE EDUCATION/TRAINING PROGRAM

## 2023-06-05 PROCEDURE — 97161 PT EVAL LOW COMPLEX 20 MIN: CPT

## 2023-06-05 PROCEDURE — 97165 OT EVAL LOW COMPLEX 30 MIN: CPT

## 2023-06-05 PROCEDURE — 83735 ASSAY OF MAGNESIUM: CPT | Performed by: STUDENT IN AN ORGANIZED HEALTH CARE EDUCATION/TRAINING PROGRAM

## 2023-06-05 PROCEDURE — 97530 THERAPEUTIC ACTIVITIES: CPT

## 2023-06-05 PROCEDURE — 85025 COMPLETE CBC W/AUTO DIFF WBC: CPT | Performed by: STUDENT IN AN ORGANIZED HEALTH CARE EDUCATION/TRAINING PROGRAM

## 2023-06-05 PROCEDURE — 36415 COLL VENOUS BLD VENIPUNCTURE: CPT | Performed by: STUDENT IN AN ORGANIZED HEALTH CARE EDUCATION/TRAINING PROGRAM

## 2023-06-05 PROCEDURE — 80197 ASSAY OF TACROLIMUS: CPT | Performed by: STUDENT IN AN ORGANIZED HEALTH CARE EDUCATION/TRAINING PROGRAM

## 2023-06-05 PROCEDURE — 25500020 PHARM REV CODE 255: Performed by: SURGERY

## 2023-06-05 PROCEDURE — 97116 GAIT TRAINING THERAPY: CPT

## 2023-06-05 PROCEDURE — 11000001 HC ACUTE MED/SURG PRIVATE ROOM

## 2023-06-05 RX ORDER — CALCIUM CARBONATE 200(500)MG
500 TABLET,CHEWABLE ORAL DAILY PRN
Status: DISCONTINUED | OUTPATIENT
Start: 2023-06-05 | End: 2023-06-05

## 2023-06-05 RX ORDER — SODIUM,POTASSIUM PHOSPHATES 280-250MG
2 POWDER IN PACKET (EA) ORAL ONCE
Status: COMPLETED | OUTPATIENT
Start: 2023-06-05 | End: 2023-06-05

## 2023-06-05 RX ORDER — OXYCODONE HYDROCHLORIDE 10 MG/1
10 TABLET ORAL EVERY 4 HOURS PRN
Status: DISCONTINUED | OUTPATIENT
Start: 2023-06-05 | End: 2023-06-08 | Stop reason: HOSPADM

## 2023-06-05 RX ORDER — HYDROMORPHONE HYDROCHLORIDE 1 MG/ML
0.5 INJECTION, SOLUTION INTRAMUSCULAR; INTRAVENOUS; SUBCUTANEOUS EVERY 6 HOURS PRN
Status: DISCONTINUED | OUTPATIENT
Start: 2023-06-05 | End: 2023-06-06

## 2023-06-05 RX ORDER — LANOLIN ALCOHOL/MO/W.PET/CERES
400 CREAM (GRAM) TOPICAL 2 TIMES DAILY
Status: COMPLETED | OUTPATIENT
Start: 2023-06-05 | End: 2023-06-05

## 2023-06-05 RX ORDER — OXYCODONE HYDROCHLORIDE 10 MG/1
10 TABLET ORAL EVERY 6 HOURS PRN
Status: DISCONTINUED | OUTPATIENT
Start: 2023-06-05 | End: 2023-06-05

## 2023-06-05 RX ORDER — POTASSIUM CHLORIDE 20 MEQ/1
20 TABLET, EXTENDED RELEASE ORAL 2 TIMES DAILY
Status: COMPLETED | OUTPATIENT
Start: 2023-06-05 | End: 2023-06-05

## 2023-06-05 RX ORDER — CALCIUM CARBONATE 200(500)MG
500 TABLET,CHEWABLE ORAL 3 TIMES DAILY PRN
Status: DISCONTINUED | OUTPATIENT
Start: 2023-06-05 | End: 2023-06-08 | Stop reason: HOSPADM

## 2023-06-05 RX ORDER — GABAPENTIN 300 MG/1
300 CAPSULE ORAL 3 TIMES DAILY
Status: DISCONTINUED | OUTPATIENT
Start: 2023-06-05 | End: 2023-06-08 | Stop reason: HOSPADM

## 2023-06-05 RX ADMIN — IOHEXOL 100 ML: 350 INJECTION, SOLUTION INTRAVENOUS at 05:06

## 2023-06-05 RX ADMIN — HYDROMORPHONE HYDROCHLORIDE 0.5 MG: 1 INJECTION, SOLUTION INTRAMUSCULAR; INTRAVENOUS; SUBCUTANEOUS at 02:06

## 2023-06-05 RX ADMIN — ASPIRIN 81 MG: 81 TABLET, COATED ORAL at 08:06

## 2023-06-05 RX ADMIN — OXYCODONE HYDROCHLORIDE 10 MG: 10 TABLET ORAL at 01:06

## 2023-06-05 RX ADMIN — PIPERACILLIN AND TAZOBACTAM 4.5 G: 4; .5 INJECTION, POWDER, LYOPHILIZED, FOR SOLUTION INTRAVENOUS; PARENTERAL at 09:06

## 2023-06-05 RX ADMIN — GABAPENTIN 300 MG: 300 CAPSULE ORAL at 08:06

## 2023-06-05 RX ADMIN — APIXABAN 5 MG: 5 TABLET, FILM COATED ORAL at 08:06

## 2023-06-05 RX ADMIN — PIPERACILLIN AND TAZOBACTAM 4.5 G: 4; .5 INJECTION, POWDER, LYOPHILIZED, FOR SOLUTION INTRAVENOUS; PARENTERAL at 01:06

## 2023-06-05 RX ADMIN — SERTRALINE HYDROCHLORIDE 25 MG: 25 TABLET ORAL at 08:06

## 2023-06-05 RX ADMIN — POTASSIUM CHLORIDE 20 MEQ: 1500 TABLET, EXTENDED RELEASE ORAL at 08:06

## 2023-06-05 RX ADMIN — POTASSIUM & SODIUM PHOSPHATES POWDER PACK 280-160-250 MG 2 PACKET: 280-160-250 PACK at 08:06

## 2023-06-05 RX ADMIN — TACROLIMUS 0.5 MG: 0.5 CAPSULE ORAL at 06:06

## 2023-06-05 RX ADMIN — CALCIUM CARBONATE (ANTACID) CHEW TAB 500 MG 500 MG: 500 CHEW TAB at 08:06

## 2023-06-05 RX ADMIN — ACETAMINOPHEN 1000 MG: 500 TABLET ORAL at 06:06

## 2023-06-05 RX ADMIN — ACETAMINOPHEN 1000 MG: 500 TABLET ORAL at 01:06

## 2023-06-05 RX ADMIN — HYDROMORPHONE HYDROCHLORIDE 0.5 MG: 1 INJECTION, SOLUTION INTRAMUSCULAR; INTRAVENOUS; SUBCUTANEOUS at 08:06

## 2023-06-05 RX ADMIN — AMIODARONE HYDROCHLORIDE 200 MG: 200 TABLET ORAL at 08:06

## 2023-06-05 RX ADMIN — GABAPENTIN 300 MG: 300 CAPSULE ORAL at 01:06

## 2023-06-05 RX ADMIN — ATORVASTATIN CALCIUM 20 MG: 20 TABLET, FILM COATED ORAL at 08:06

## 2023-06-05 RX ADMIN — ACETAMINOPHEN 1000 MG: 500 TABLET ORAL at 09:06

## 2023-06-05 RX ADMIN — GABAPENTIN 300 MG: 300 CAPSULE ORAL at 04:06

## 2023-06-05 RX ADMIN — CALCIUM CARBONATE (ANTACID) CHEW TAB 500 MG 500 MG: 500 CHEW TAB at 01:06

## 2023-06-05 RX ADMIN — Medication 400 MG: at 08:06

## 2023-06-05 RX ADMIN — PIPERACILLIN AND TAZOBACTAM 4.5 G: 4; .5 INJECTION, POWDER, LYOPHILIZED, FOR SOLUTION INTRAVENOUS; PARENTERAL at 06:06

## 2023-06-05 RX ADMIN — AMLODIPINE BESYLATE 10 MG: 5 TABLET ORAL at 08:06

## 2023-06-05 RX ADMIN — POTASSIUM CHLORIDE 20 MEQ: 1500 TABLET, EXTENDED RELEASE ORAL at 09:06

## 2023-06-05 RX ADMIN — TACROLIMUS 0.5 MG: 0.5 CAPSULE ORAL at 08:06

## 2023-06-05 NOTE — PLAN OF CARE
[facility] - Surgery      HOME HEALTH ORDERS  FACE TO FACE ENCOUNTER    Patient Name: Tej Purdy  YOB: 1955    PCP: Thais Maxwell MD   [facility] [address] [facility] [address]  PCP Phone Number: [phone]  PCP Fax: [phone]    Encounter Date: 6/1/23    Admit to Home Health    Diagnoses:  Active Hospital Problems    Diagnosis  POA    *Acute appendicitis [K35.80]  Yes    S/P liver transplant [Z94.4]  Not Applicable      Resolved Hospital Problems   No resolved problems to display.       Follow Up Appointments:  Future Appointments   Date Time Provider Department Center   6/19/2023  8:00 AM LAB, [place] [facility] [place]       Allergies:  Review of patient's allergies indicates:   Allergen Reactions    Bee pollens Swelling     BEE STINGS swells body       Medications: Review discharge medications with patient and family and provide education.    Current Facility-Administered Medications   Medication Dose Route Frequency Provider Last Rate Last Admin    acetaminophen tablet 1,000 mg  1,000 mg Oral Q8H Ashu Mahajan MD   1,000 mg at 06/05/23 0602    amiodarone tablet 200 mg  200 mg Oral Daily Ashu Mahajan MD   200 mg at 06/05/23 0814    amLODIPine tablet 10 mg  10 mg Oral Daily Doron Chowdary MD   10 mg at 06/05/23 0815    apixaban tablet 5 mg  5 mg Oral BID Fabrizio Cantu MD   5 mg at 06/05/23 0815    aspirin EC tablet 81 mg  81 mg Oral Daily Fabrizio Cantu MD   81 mg at 06/05/23 0815    atorvastatin tablet 20 mg  20 mg Oral QHS Fabrizio Cantu MD   20 mg at 06/04/23 2038    calcium carbonate 200 mg calcium (500 mg) chewable tablet 500 mg  500 mg Oral TID PRN Fabrizio Cantu MD   500 mg at 06/05/23 0823    dextrose 10% bolus 125 mL 125 mL  12.5 g Intravenous PRN Ashu Mahajan MD        dextrose 10% bolus 250 mL 250 mL  25 g Intravenous PRN Ashu Mahajan MD        gabapentin capsule 300 mg  300 mg Oral TID Heriberto Anna MD   300 mg at 06/05/23 0814    glucagon  (human recombinant) injection 1 mg  1 mg Intramuscular PRN Ashu Mahajan MD        HYDROmorphone injection 0.5 mg  0.5 mg Intravenous Q6H PRN Fabrizio Cantu MD   0.5 mg at 06/05/23 0813    insulin aspart U-100 pen 0-5 Units  0-5 Units Subcutaneous TIDWM Doron Chowdary MD   0 Units at 06/04/23 0828    magnesium oxide tablet 400 mg  400 mg Oral BID Fabrizio Cantu MD   400 mg at 06/05/23 0815    ondansetron injection 4 mg  4 mg Intravenous Q6H PRN Ashu Mahajan MD        oxyCODONE immediate release tablet 5 mg  5 mg Oral Q4H PRN Ashu Mahajan MD   5 mg at 06/04/23 2201    oxyCODONE immediate release tablet Tab 10 mg  10 mg Oral Q4H PRN Heriberto Anna MD   10 mg at 06/05/23 0152    piperacillin-tazobactam (ZOSYN) 4.5 g in dextrose 5 % in water (D5W) 5 % 100 mL IVPB (MB+)  4.5 g Intravenous Q8H Ashu Mahajan MD   Stopped at 06/05/23 1012    potassium chloride SA CR tablet 20 mEq  20 mEq Oral BID Keyshawn Summers MD   20 mEq at 06/05/23 0900    sertraline tablet 25 mg  25 mg Oral Daily Fabrizio Cantu MD   25 mg at 06/05/23 0815    sodium chloride 0.9% flush 10 mL  10 mL Intravenous PRN Ashu Mahajan MD        tacrolimus capsule 0.5 mg  0.5 mg Oral BID Ashu Mahajan MD   0.5 mg at 06/05/23 0816     Facility-Administered Medications Ordered in Other Encounters   Medication Dose Route Frequency Provider Last Rate Last Admin    sodium chloride 0.9% bolus 1,000 mL  1,000 mL Intravenous Once Solange iBll NP         Current Discharge Medication List        CONTINUE these medications which have NOT CHANGED    Details   amiodarone (PACERONE) 200 MG Tab Take 1 tablet (200 mg total) by mouth once daily.  Qty: 30 tablet, Refills: 11    Associated Diagnoses: Persistent atrial fibrillation      apixaban (ELIQUIS) 5 mg Tab Take 1 tablet (5 mg total) by mouth 2 (two) times daily.  Qty: 60 tablet, Refills: 3      aspirin (ECOTRIN) 81 MG EC tablet Take 81 mg by mouth once daily.      calcium carbonate-vitamin D3  600 mg-20 mcg (800 unit) Tab Take 1 tablet by mouth once daily.  Qty: 30 tablet, Refills: 5      gabapentin (NEURONTIN) 300 MG capsule Take 1 capsule (300 mg total) by mouth 3 (three) times daily as needed (neuropathy).  Qty: 90 capsule, Refills: 5    Associated Diagnoses: Diabetic polyneuropathy associated with type 2 diabetes mellitus      insulin (LANTUS SOLOSTAR U-100 INSULIN) glargine 100 units/mL SubQ pen INJECT 22 UNITS INTO THE SKIN EVERY EVENING. ADJUST DOSE UNTIL AM GLUCOSE GOAL . MAX DOSE 30 UNITS/DY  Qty: 15 mL, Refills: 11    Associated Diagnoses: Type 2 diabetes mellitus with hyperglycemia, with long-term current use of insulin      insulin lispro (HUMALOG KWIKPEN INSULIN) 100 unit/mL pen Inject 6 Units into the skin 3 (three) times daily with meals. Plus sliding scale, MDD: 48 units  Qty: 15 mL, Refills: 11    Associated Diagnoses: Type 2 diabetes mellitus with hyperglycemia, with long-term current use of insulin      lovastatin (MEVACOR) 20 MG tablet Take 20 mg by mouth once daily.      magnesium oxide (MAG-OX) 400 mg (241.3 mg magnesium) tablet Take 2 tablets (800 mg total) by mouth 3 (three) times daily.  Qty: 180 tablet, Refills: 6    Associated Diagnoses: Hypomagnesemia      multivitamin capsule Take 1 capsule by mouth once daily.      omega-3 fatty acids/fish oil (FISH OIL-OMEGA-3 FATTY ACIDS) 300-1,000 mg capsule Take 1 capsule by mouth once daily.       ondansetron (ZOFRAN-ODT) 4 MG TbDL Dissolve 1 tablet (4 mg total) by mouth every 8 (eight) hours as needed (nausea).  Qty: 30 tablet, Refills: 5    Associated Diagnoses: S/P liver transplant      pantoprazole (PROTONIX) 40 MG tablet Take 1 tablet (40 mg total) by mouth once daily.  Qty: 30 tablet, Refills: 5    Associated Diagnoses: Portal hypertension; Other cirrhosis of liver; Awaiting liver transplant      sertraline (ZOLOFT) 25 MG tablet Take 1 tablet (25 mg total) by mouth once daily.  Qty: 30 tablet, Refills: 5      tacrolimus  "(PROGRAF) 0.5 MG Cap Take 1 capsule (0.5 mg total) by mouth every 12 (twelve) hours.  Qty: 60 capsule, Refills: 11    Comments: Txp Date:1/6/2022 (Liver) Disch Date:1/13/2022 ICD10:Z94.4 Txp Location:[place]  Associated Diagnoses: S/P liver transplant      amLODIPine (NORVASC) 10 MG tablet Take 10 mg by mouth once daily.      blood sugar diagnostic Strp To check BG 2 times daily, to use with insurance preferred meter (patient has a [employer] self-monitoring glucose meter)  Qty: 100 strip, Refills: 11    Associated Diagnoses: Type 2 diabetes mellitus without complication, with long-term current use of insulin      blood-glucose meter kit To check BG 2 times daily, to use with insurance preferred meter  Qty: 1 each, Refills: 0    Associated Diagnoses: Type 2 diabetes mellitus without complication, with long-term current use of insulin      calcium carbonate (TUMS) 200 mg calcium (500 mg) chewable tablet Take 1 tablet (500 mg total) by mouth 3 (three) times daily as needed for Heartburn.  Qty: 90 tablet, Refills: 5      furosemide (LASIX) 20 MG tablet Take 1 tablet (20 mg total) by mouth once daily.  Qty: 30 tablet, Refills: 2      lancets Misc To check BG 2 times daily, to use with insurance preferred meter  Qty: 100 each, Refills: 11    Associated Diagnoses: Type 2 diabetes mellitus without complication, with long-term current use of insulin      pen needle, diabetic (BD ULTRA-FINE GÉNESIS PEN NEEDLE) 32 gauge x 5/32" Ndle Use to inject insulin 4 times daily  Qty: 200 each, Refills: 11    Associated Diagnoses: Type 2 diabetes mellitus with hyperglycemia, with long-term current use of insulin      pulse oximeter (PULSE OXIMETER) device by Apply Externally route 2 (two) times a day. Use twice daily at 8 AM and 3 PM and record the value in MyChart as directed.  Qty: 1 each, Refills: 0    Comments: This is a NO CHARGE item.  Please override price to zero.  DO NOT PRINT.  NORMAL MODE e-PRESCRIBE ONLY.      zolpidem (AMBIEN) 5 " MG Tab Take 1 tablet (5 mg total) by mouth nightly as needed (insomnia).  Qty: 30 tablet, Refills: 0    Associated Diagnoses: S/P liver transplant               I have seen and examined this patient within the last 30 days. My clinical findings that support the need for the home health skilled services and home bound status are the following:no   Weakness/numbness causing balance and gait disturbance due to Weakness/Debility and Surgery making it taxing to leave home.     Diet:   regular diet    Referrals/ Consults  Physical Therapy to evaluate and treat. Evaluate for home safety and equipment needs; Establish/upgrade home exercise program. Perform / instruct on therapeutic exercises, gait training, transfer training, and Range of Motion.  Occupational Therapy to evaluate and treat. Evaluate home environment for safety and equipment needs. Perform/Instruct on transfers, ADL training, ROM, and therapeutic exercises.    Activities:   activity as tolerated, ambulate in house , no driving while on analgesics, no strenuous exercise for 6 weeks post op, and no heavy lifting for 6 weeks post op    Nursing:   Agency to admit patient within 24 hours of hospital discharge unless specified on physician order or at patient request    SN to complete comprehensive assessment including routine vital signs. Instruct on disease process and s/s of complications to report to MD. Review/verify medication list sent home with the patient at time of discharge  and instruct patient/caregiver as needed. Frequency may be adjusted depending on start of care date.     Skilled nurse to perform up to 3 visits PRN for symptoms related to diagnosis    Notify MD if SBP > 160 or < 90; DBP > 90 or < 50; HR > 120 or < 50; Temp > 101; O2 < 88%    Ok to schedule additional visits based on staff availability and patient request on consecutive days within the home health episode.    When multiple disciplines ordered:    Start of Care occurs on Sunday -  Wednesday schedule remaining discipline evaluations as ordered on separate consecutive days following the start of care.    Thursday SOC -schedule subsequent evaluations Friday and Monday the following week.     Friday - Saturday SOC - schedule subsequent discipline evaluations on consecutive days starting Monday of the following week.    Miscellaneous   Diabetic Care:   SN to perform and educate Diabetic management with blood glucose monitoring:, Fingerstick blood sugar a.m. and p.m., and Report CBG < 60 or > 350 to physician.      Home Health Aide:  Nursing Twice weekly, Physical Therapy Three times weekly, and Occupational Therapy Three times weekly    Wound Care Orders  yes:  Surgical Wound:  Location: abdomen  Keep clean and dry. Monitor for skin changes and signs of infection including but not limited to erythema, drainage, increased warmth.       I certify that this patient is confined to his home and needs intermittent skilled nursing care, physical therapy, and occupational therapy.

## 2023-06-05 NOTE — SUBJECTIVE & OBJECTIVE
Interval History: Increased pain this morning on rounds. Looks uncomfortable. No fevers or chills. No nausea or vomiting. Some flatus, but not much. Lots of reflux.     Medications:  Continuous Infusions:  Scheduled Meds:   acetaminophen  1,000 mg Oral Q8H    amiodarone  200 mg Oral Daily    amLODIPine  10 mg Oral Daily    apixaban  5 mg Oral BID    aspirin  81 mg Oral Daily    atorvastatin  20 mg Oral QHS    gabapentin  300 mg Oral TID    insulin aspart U-100  0-5 Units Subcutaneous TIDWM    magnesium oxide  400 mg Oral BID    piperacillin-tazobactam (ZOSYN) IVPB  4.5 g Intravenous Q8H    potassium chloride  20 mEq Oral BID    potassium, sodium phosphates  2 packet Oral Once    sertraline  25 mg Oral Daily    tacrolimus  0.5 mg Oral BID     PRN Meds:calcium carbonate, dextrose 10%, dextrose 10%, glucagon (human recombinant), HYDROmorphone, ondansetron, oxyCODONE, oxyCODONE, sodium chloride 0.9%     Review of patient's allergies indicates:   Allergen Reactions    Bee pollens Swelling     BEE STINGS swells body     Objective:     Vital Signs (Most Recent):  Temp: 98.4 °F (36.9 °C) (06/05/23 0724)  Pulse: 91 (06/05/23 0724)  Resp: 17 (06/05/23 0724)  BP: (!) 189/89 (06/05/23 0724)  SpO2: (!) 93 % (06/05/23 0724) Vital Signs (24h Range):  Temp:  [97.4 °F (36.3 °C)-98.5 °F (36.9 °C)] 98.4 °F (36.9 °C)  Pulse:  [73-98] 91  Resp:  [15-18] 17  SpO2:  [91 %-93 %] 93 %  BP: (120-189)/(73-89) 189/89     Weight: 102.5 kg (226 lb)  Body mass index is 30.65 kg/m².    Intake/Output - Last 3 Shifts         06/03 0700  06/04 0659 06/04 0700  06/05 0659 06/05 0700  06/06 0659    P.O. 570 240     IV Piggyback 425.8      Total Intake(mL/kg) 995.8 (9.7) 240 (2.3)     Urine (mL/kg/hr) 775 (0.3) 1540 (0.6)     Emesis/NG output       Drains 360 710     Stool 0      Total Output 1135 2250     Net -139.2 -2010            Urine Occurrence 1 x      Stool Occurrence 0 x               Physical Exam  Vitals and nursing note reviewed.    Cardiovascular:      Rate and Rhythm: Normal rate.   Pulmonary:      Effort: Pulmonary effort is normal.   Abdominal:      Tenderness: There is abdominal tenderness.      Comments: Increased tenderness especially over right hemiabdomen. Incision looks okay. Drain is thin with ascites.         Significant Labs:  I have reviewed all pertinent lab results within the past 24 hours.  CBC:   Recent Labs   Lab 06/05/23  0324   WBC 13.38*   RBC 5.56   HGB 11.7*   HCT 39.9*      MCV 72*   MCH 21.0*   MCHC 29.3*     CMP:   Recent Labs   Lab 06/05/23  0325   *   CALCIUM 8.8   ALBUMIN 2.6*   PROT 5.8*      K 3.3*   CO2 27      BUN 23   CREATININE 0.8   ALKPHOS 61   ALT 13   AST 16   BILITOT 0.8       Significant Diagnostics:  CT abdomen/pelvis pending.

## 2023-06-05 NOTE — PT/OT/SLP EVAL
Occupational Therapy   Co-Evaluation  Co-treatment with PT for maximal pt participation, safety, and activity tolerance     Name: Tej Purdy  MRN: [id number]  Admitting Diagnosis: Acute appendicitis  Recent Surgery: Procedure(s) (LRB):  APPENDECTOMY, LAPAROSCOPIC (N/A) 3 Days Post-Op    Recommendations:     Discharge Recommendations: other (see comments)  Discharge Equipment Recommendations:  none  Barriers to discharge:  Other (Comment)    Assessment:     Tej Purdy is a 67 y.o. male with a medical diagnosis of Acute appendicitis.  He presents with c/o pain at abdomen however completed functional mobility from the EOB  bathroom> 130ft in hallway> bedside chair . Performance deficits affecting function: weakness, impaired endurance, impaired functional mobility, impaired cardiopulmonary response to activity, impaired self care skills, impaired balance, gait instability.      Rehab Prognosis: Good; patient would benefit from acute skilled OT services to address these deficits and reach maximum level of function.       Plan:     Patient to be seen 3 x/week to address the above listed problems via self-care/home management, therapeutic activities, therapeutic exercises, neuromuscular re-education  Plan of Care Expires: 07/05/23  Plan of Care Reviewed with: patient    Subjective     Chief Complaint: pain at abdomen   Patient/Family Comments/goals: Pt. Reports he has been having a lot of gas     Occupational Profile:  Living Environment: Lives in a H with ~20STE (L HR) with his s/o. The home has a tub/shower combo  Previous level of function: Ind in all ADLs and functional mobility   Roles and Routines: S/o and retired  Equipment Used at Home: glucometer, cane, straight, shower chair  Assistance upon Discharge: s/o    Pain/Comfort:  Pain Rating 1: 6/10  Location - Side 1: Right  Location - Orientation 1: generalized  Location 1: abdomen  Pain Addressed 1: Reposition, Distraction  Pain Rating Post-Intervention 1:  7/10    Patients cultural, spiritual, Cheondoism conflicts given the current situation:      Objective:     Communicated with: RN prior to session.  Patient found HOB elevated with DANNIELLE drain, peripheral IV upon OT entry to room.    General Precautions: Standard, fall, hearing impaired  Orthopedic Precautions: N/A  Braces: N/A  Respiratory Status: Room air    Occupational Performance:    Bed Mobility:    Patient completed Supine to Sit with stand by assistance    Functional Mobility/Transfers:  Patient completed Sit <> Stand Transfer with stand by assistance  with  no assistive device   Functional Mobility: Pt demonstrated functional mobility training to simulate household from EOB> bathroom> 130ft in hallway> bedside chair  with RW with contact guard assistance and 1 noted LOB    Activities of Daily Living:  Grooming: stand by assistance hand hygiene standing at sink   Toileting: stand by assistance standing at toilet     Cognitive/Visual Perceptual:  Cognitive/Psychosocial Skills:     -       Oriented to: Person, Place, and Situation   -       Follows Commands/attention:Follows multistep  commands  -       Communication: clear/fluent  -       Memory: No Deficits noted  -       Safety awareness/insight to disability: intact   -       Mood/Affect/Coping skills/emotional control: Appropriate to situation  Visual/Perceptual:      -Wear Eyeglasses    Hearing: impaired     Physical Exam:  Balance:    -Static Sitting:  Ind  Dynamic Sitting: Ind  Static Standing: Sup    Postural examination/scapula alignment:    -       Rounded shoulders  Upper Extremity Range of Motion:    -       Right Upper Extremity: WFL  -       Left Upper Extremity: WFL  Upper Extremity Strength:    -       Right Upper Extremity: WFL  -       Left Upper Extremity: WFL   Strength:    -       Right Upper Extremity: WFL  -       Left Upper Extremity: WFL    AMPAC 6 Click ADL:  AMPAC Total Score: 20    Treatment & Education:  Pt. Educated on energy  conservation techniques upon discharge to return home to maintain roles  Pt educated on role of occupational therapy, POC, and safety during ADLs and functional mobility. Pt and OT discussed importance of safe, continued mobility to optimize daily living skills. Pt verbalized understanding. Pt given instruction to call for medical staff/nurse for assistance.     Patient left up in chair with call button in reach    GOALS:   Multidisciplinary Problems       Occupational Therapy Goals          Problem: Occupational Therapy    Goal Priority Disciplines Outcome Interventions   Occupational Therapy Goal     OT, PT/OT Ongoing, Progressing    Description: Goals to be met by: 6/13/23     Patient will increase functional independence with ADLs by performing:    UE Dressing with [place].  LE Dressing with [place].  Grooming while standing at sink with [place].  Toileting from toilet with [place] for hygiene and clothing management.   Toilet transfer to toilet with [place].                         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    Cirrhosis 11/23/2020    Diabetes mellitus, type 2     Diabetic neuropathy 11/24/2020    Disorder of kidney and ureter     HTN (hypertension) 11/23/2020    Hyperlipidemia 11/23/2020    Kidney stones 11/23/2020    S/p lithotripsy 2020    Leukocytosis 1/15/2021    Liver mass 11/23/2020    CASTILLO (nonalcoholic steatohepatitis) 11/23/2020    Other ascites 3/16/2022    PAD (peripheral artery disease)     Portal hypertension 11/23/2020    Skin cancer 11/23/2020         Past Surgical History:   Procedure Laterality Date    COLONOSCOPY  10/2020    ESOPHAGOGASTRODUODENOSCOPY  10/2020    ESOPHAGOGASTRODUODENOSCOPY N/A 7/1/2021    Procedure: EGD (ESOPHAGOGASTRODUODENOSCOPY);  Surgeon: Jovan David MD;  Location: [address];  Service: Endoscopy;  Laterality: N/A;  HCC. Listed for liver transplant. EGD  for variceal surveillance.  cardiac clearance and blood thinenr approval received, see telephone encounter 6/23/21-BB  fully vaccinated-BB  labs same day of procedure-BB    EXCISION OF HYDROCELE Right     hemorroid surgery      hemorroidectomy      KIDNEY STONE SURGERY      LAPAROSCOPIC APPENDECTOMY N/A 6/2/2023    Procedure: APPENDECTOMY, LAPAROSCOPIC;  Surgeon: Shan Ross MD;  [facility];  Service: General;  Laterality: N/A;    LEFT HEART CATHETERIZATION N/A 1/27/2021    Procedure: Left heart cath;  Surgeon: Kris Shelton MD;  Location: [facility] CATH LAB;  Service: Cardiology;  Laterality: N/A;    liver mass removal      LIVER TRANSPLANT N/A 1/6/2022    Procedure: TRANSPLANT, LIVER;  Surgeon: Lizet Garcia MD;  [facility];  Service: Transplant;  Laterality: N/A;    RIGHT HEART CATHETERIZATION Right 5/7/2021    Procedure: INSERTION, CATHETER, RIGHT HEART;  Surgeon: Jaden Schuster MD;  Location: [facility] CATH LAB;  Service: Cardiology;  Laterality: Right;    TREATMENT OF CARDIAC ARRHYTHMIA N/A 5/19/2021    Procedure: CARDIOVERSION;  Surgeon: Didier Tilley MD;  Location: [facility] EP LAB;  Service: Cardiology;  Laterality: N/A;  a fib, dccv/johny, mac, dm. sscu    TREATMENT OF CARDIAC ARRHYTHMIA N/A 5/31/2021    Procedure: CARDIOVERSION;  Surgeon: Daniel Arambula MD;  Location: [facility] EP LAB;  Service: Cardiology;  Laterality: N/A;  afib, DCCV, anest., DM, 3 prep    TREATMENT OF CARDIAC ARRHYTHMIA N/A 7/7/2021    Procedure: Cardioversion or Defibrillation;  Surgeon: Didier Tilley MD;  Location: [facility] EP LAB;  Service: Cardiology;  Laterality: N/A;  AF, JOHNY (cancel if complaint), DCCV, MAC, DM, 3 Prep    TREATMENT OF CARDIAC ARRHYTHMIA N/A 7/27/2021    Procedure: Cardioversion or Defibrillation;  Surgeon: Didier Tilley MD;  Location: [facility] EP LAB;  Service: Cardiology;  Laterality: N/A;  AF, JOHNY (cx if complaint), DCCV, MAC, DM, 3 Prep       Time Tracking:     OT Date of Treatment:  06/05/23  OT Start Time: 1122  OT Stop Time: 1138  OT Total Time (min): 16 min    Billable Minutes:Evaluation 8  Therapeutic Activity 8    6/5/2023

## 2023-06-05 NOTE — PLAN OF CARE
Tej Purdy is a 67 y.o. male admitted to [facility] on 2023 for Acute appendicitis, underwent laparoscopic appendectomy with abscess drainage on 23. Tej Purdy tolerated evaluation well today. Mr. Burnham was very pleasant and agreeable to mobilize, pain consistently 6-7/10 at rest and with activity (at abdomen). Demonstrates stand-by assistance for bed mobility and transfers. Ambulates 130 ft in hallways with mostly stand-by assistance, 1 episode of unsteadiness, uses railing in hallway occasionally for support. Discussed PT role, POC, and goals with patient; verbalized understanding. Tej Purdy would benefit from acute PT services to promote mobility during this admission and improve return to PLOF.    Problem: Physical Therapy  Goal: Physical Therapy Goal  Description: Goals to be met by: 23     Patient will increase functional independence with mobility by performin. Supine to sit with [place] - Not met  2. Sit to stand transfer with [place] - Not met  3. Gait  x 300 feet with Supervision with or without single point cane - Not met  4. Ascend/descend 20 stairs with left Handrail with Stand-by Assistance with or without single point cane in opposite hand - Not met  Outcome: Ongoing, Progressing    Henrique Lopez, PT  2023

## 2023-06-05 NOTE — PLAN OF CARE
Problem: Occupational Therapy  Goal: Occupational Therapy Goal  Description: Goals to be met by: 6/13/23     Patient will increase functional independence with ADLs by performing:    UE Dressing with [place].  LE Dressing with [place].  Grooming while standing at sink with [place].  Toileting from toilet with [place] for hygiene and clothing management.   Toilet transfer to toilet with [place].    Outcome: Ongoing, Progressing   Pt. Evaluated and goals established

## 2023-06-05 NOTE — ASSESSMENT & PLAN NOTE
66 yo M with history of liver transplant with perforated appendicitis s/p laparoscopic appendectomy and drainage of intra-abdominal abscess.    -back down to clears  -keep drain  -continue IV abx  -PRN pain and nausea meds   -added dilaudid today  -okay for tums  -home meds  -plan for CT abdomen/pelvis today with contrast  - Hepatology following for liver transplant.

## 2023-06-05 NOTE — PT/OT/SLP EVAL
Physical Therapy  Co-Evaluation and Treatment    Tej Purdy   [id number]    Time Tracking:     PT Received On: 06/05/23   PT Start Time: 1122   PT Stop Time: 1138   PT Total Time (min): 16 min    Billable Minutes: Evaluation 8 and Gait Training 8 minutes       Recommendations:     Discharge recommendations: Other (would benefit from post-acute PT upon D/C)     Equipment recommendations: None    Barriers to Discharge: Inaccessible home environment (has ~20 stairs leading into his apartment)    Patient Information:     Recent Surgery: Procedure(s) (LRB):  APPENDECTOMY, LAPAROSCOPIC (N/A) 3 Days Post-Op    Diagnosis: Acute appendicitis    Length of Stay: 3 days    General Precautions: Standard, fall, hearing impaired, clear liquid diet  Orthopedic Precautions: None  Brace: None    Assessment:     Tej Purdy is a 67 y.o. male admitted to [facility] [place] [facility] on 6/1/2023 for Acute appendicitis, underwent laparoscopic appendectomy with abscess drainage on 6/2/23. Tej Purdy tolerated evaluation well today. Mr. Burnham was very pleasant and agreeable to mobilize, pain consistently 6-7/10 at rest and with activity (at abdomen). Demonstrates stand-by assistance for bed mobility and transfers. Ambulates 130 ft in hallways with mostly stand-by assistance, 1 episode of unsteadiness, uses railing in hallway occasionally for support. Discussed PT role, POC, and goals with patient; verbalized understanding. Tej Purdy would benefit from acute PT services to promote mobility during this admission and improve return to PLOF.    Problem List: weakness, decreased endurance, impaired self-care skills, impaired mobility, decreased sitting or standing balance, gait instability, impaired cardiopulmonary response to activity, and pain    Rehab Prognosis: Good; patient would benefit from acute skilled PT services to address these deficits and reach maximum level of function.    Plan:     Patient to be seen 3 x/week to address the above  "listed problems via gait training, therapeutic activities, therapeutic exercises, neuromuscular re-education    Plan of Care Expires: 07/05/23  Plan of Care reviewed with: patient    Subjective:     Communicated with RN prior to evaluation, appropriate to see for evaluation.    Pt found supine in bed (HOB elevated) upon PT entry to room, agreeable to evaluation.    Patient commenting: "I've just been really low energy since my transplant last year."    Does this patient have any cultural, spiritual, Druze conflicts given the current situation? Patient has no barriers to learning. Patient verbalizes understanding of his/her program and goals and demonstrates them correctly. No cultural, spiritual, or educational needs identified.    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    Cirrhosis 11/23/2020    Diabetes mellitus, type 2     Diabetic neuropathy 11/24/2020    Disorder of kidney and ureter     HTN (hypertension) 11/23/2020    Hyperlipidemia 11/23/2020    Kidney stones 11/23/2020    S/p lithotripsy 2020    Leukocytosis 1/15/2021    Liver mass 11/23/2020    CASTILLO (nonalcoholic steatohepatitis) 11/23/2020    Other ascites 3/16/2022    PAD (peripheral artery disease)     Portal hypertension 11/23/2020    Skin cancer 11/23/2020      Past Surgical History:   Procedure Laterality Date    COLONOSCOPY  10/2020    ESOPHAGOGASTRODUODENOSCOPY  10/2020    ESOPHAGOGASTRODUODENOSCOPY N/A 7/1/2021    Procedure: EGD (ESOPHAGOGASTRODUODENOSCOPY);  Surgeon: Jovan David MD;  Location: [address];  Service: Endoscopy;  Laterality: N/A;  HCC. Listed for liver transplant. EGD for variceal surveillance.  cardiac clearance and blood thinenr approval received, see telephone encounter 6/23/21-BB  fully vaccinated-BB  labs same day of procedure-BB    EXCISION OF HYDROCELE Right     hemorroid surgery      hemorroidectomy      KIDNEY STONE SURGERY      LAPAROSCOPIC " APPENDECTOMY N/A 6/2/2023    Procedure: APPENDECTOMY, LAPAROSCOPIC;  Surgeon: Shan Ross MD;  Location: [facility] OR [facility] FLR;  Service: General;  Laterality: N/A;    LEFT HEART CATHETERIZATION N/A 1/27/2021    Procedure: Left heart cath;  Surgeon: Kris Shelton MD;  Location: [facility] CATH LAB;  Service: Cardiology;  Laterality: N/A;    liver mass removal      LIVER TRANSPLANT N/A 1/6/2022    Procedure: TRANSPLANT, LIVER;  Surgeon: Lizet Garcia MD;  Location: [facility] OR [facility] FLR;  Service: Transplant;  Laterality: N/A;    RIGHT HEART CATHETERIZATION Right 5/7/2021    Procedure: INSERTION, CATHETER, RIGHT HEART;  Surgeon: Jaden Schuster MD;  Location: [facility] CATH LAB;  Service: Cardiology;  Laterality: Right;    TREATMENT OF CARDIAC ARRHYTHMIA N/A 5/19/2021    Procedure: CARDIOVERSION;  Surgeon: Didier Tilley MD;  Location: [facility] EP LAB;  Service: Cardiology;  Laterality: N/A;  a fib, dccv/johny, mac, dm. sscu    TREATMENT OF CARDIAC ARRHYTHMIA N/A 5/31/2021    Procedure: CARDIOVERSION;  Surgeon: Daniel Arambula MD;  Location: [facility] EP LAB;  Service: Cardiology;  Laterality: N/A;  afib, DCCV, anest., DM, 3 prep    TREATMENT OF CARDIAC ARRHYTHMIA N/A 7/7/2021    Procedure: Cardioversion or Defibrillation;  Surgeon: Didier Tilley MD;  Location: [facility] EP LAB;  Service: Cardiology;  Laterality: N/A;  AF, JOHNY (cancel if complaint), DCCV, MAC, DM, 3 Prep    TREATMENT OF CARDIAC ARRHYTHMIA N/A 7/27/2021    Procedure: Cardioversion or Defibrillation;  Surgeon: Didier Tilley MD;  Location: [facility] EP LAB;  Service: Cardiology;  Laterality: N/A;  AF, JOHNY (cx if complaint), DCCV, MAC, DM, 3 Prep       Living Environment:  Pt lives with his significant other (Aniya) in a 2nd story apartment with ~20 steps to enter, L HR available.    PLOF:  Prior to admission, patient was independent with mobility but does have a single point cane to use as needed. Independent with self-care but does use a shower chair for bathing  safety.    DME:  Patient owns or has access to the following DME: Single Point Cane, Shower Chair, and glucometer    Upon discharge, patient will have assistance from significant other (Aniya).    Objective:     Patient found with: peripheral IV, DANNIELLE drain    Pain:  Pain Rating 1: 6/10 at generalized abdomen  Pain Rating Post-Intervention 1: 7/10 (same, see above) post activity    Cognitive Exam:  Patient is oriented to Person, Place, Time, and Situation.  Patient follows 100% of single-step commands.    Sensation:   Intact    Lower Extremity Range of Motion:  Right Lower Extremity: WFL actively  Left Lower Extremity: WFL actively    Lower Extremity Strength:  Right Lower Extremity: grossly 4/5 via MMT  Left Lower Extremity: grossly 4/5 via MMT    Functional Mobility:    Bed Mobility:  Supine to Sitting: stand-by assistance    Transfers:  Sit to Stand: stand-by assistance from EOB with no AD x 1 trial(s)    Gait:  130 feet in hallways with mostly stand-by assistance, 1 episode of unsteadiness, uses railing in hallway occasionally for support    Assist level: Stand-By Assist  Device: no AD    Balance:  Static Sit: Independent at EOB    Static Stand: Supervision with no AD    AM-PAC 6 CLICK MOBILITY  Turning over in bed (including adjusting bedclothes, sheets and blankets)?: 4  Sitting down on and standing up from a chair with arms (e.g., wheelchair, bedside commode, etc.): 4  Moving from lying on back to sitting on the side of the bed?: 4  Moving to and from a bed to a chair (including a wheelchair)?: 4  Need to walk in hospital room?: 3  Climbing 3-5 steps with a railing?: 3  Basic Mobility Total Score: 22    Patient was left reclined in bedside chair with all lines intact and call button in reach.    Clinical Decision Making for Evaluation Complexity:  1. Body System(s) Examination: 1-2  2. Clinical Presentation: Evolving  3. Evaluation Complexity: Low    GOALS:   Multidisciplinary Problems       Physical Therapy  Goals          Problem: Physical Therapy    Goal Priority Disciplines Outcome Goal Variances Interventions   Physical Therapy Goal     PT, PT/OT      Description: Goals to be met by: 23     Patient will increase functional independence with mobility by performin. Supine to sit with [place] - Not met  2. Sit to stand transfer with [place] - Not met  3. Gait  x 300 feet with Supervision with or without single point cane - Not met  4. Ascend/descend 20 stairs with left Handrail with Stand-by Assistance with or without single point cane in opposite hand - Not met                     Henrique Lopez, PT  2023

## 2023-06-05 NOTE — PLAN OF CARE
POC reviewed at bedside. Questions and concerns addressed. DANNIELLE drain to LLQ with straw colored drainage. Pain not controlled with po med. Requesting Dilaudid be restarted. Dr. Anna notified and declined to to reorder Dilaudid. VSS. Placed bed in low and locked position. Call light within reach. Side rails up x2. Instructed to call for any needs. Verbalized understanding of all instructions. Frequent rounds. No falls/injuries this shift.

## 2023-06-05 NOTE — NURSING
"Notified Dr. Anna of pt. Yelling at this writer regarding a not being able to get a Tums without a doctoirs orders and wanting Dilaudid restarted. Informed that pt. Is dissatisfied with pain management and stated that Tums is an OTC med and should be able to be given Attempted to explain to patient that no meds can be given without an order. Pt yelling and cursing and stated "insurance companies are the ones that drive this shit" Informed will adm med as soon as ordered and profilled.   "

## 2023-06-05 NOTE — PLAN OF CARE
06/05/23 1412   Post-Acute Status   Post-Acute Authorization Home Health   Home Health Status Referrals Sent     SW faxed referrals to [facility] via [facility] for review. SW will follow up as needed.    2:54 PM  [facility]- Accepted, HH order in.    Brunilda Gonzalez LMSW  Case Management   [facility]   Ext. [phone]

## 2023-06-05 NOTE — PROGRESS NOTES
[facility]  General Surgery  Progress Note    Subjective:       Post-Op Info:  Procedure(s) (LRB):  APPENDECTOMY, LAPAROSCOPIC (N/A)   3 Days Post-Op     Interval History: Increased pain this morning on rounds. Looks uncomfortable. No fevers or chills. No nausea or vomiting. Some flatus, but not much. Lots of reflux.     Medications:  Continuous Infusions:  Scheduled Meds:   acetaminophen  1,000 mg Oral Q8H    amiodarone  200 mg Oral Daily    amLODIPine  10 mg Oral Daily    apixaban  5 mg Oral BID    aspirin  81 mg Oral Daily    atorvastatin  20 mg Oral QHS    gabapentin  300 mg Oral TID    insulin aspart U-100  0-5 Units Subcutaneous TIDWM    magnesium oxide  400 mg Oral BID    piperacillin-tazobactam (ZOSYN) IVPB  4.5 g Intravenous Q8H    potassium chloride  20 mEq Oral BID    potassium, sodium phosphates  2 packet Oral Once    sertraline  25 mg Oral Daily    tacrolimus  0.5 mg Oral BID     PRN Meds:calcium carbonate, dextrose 10%, dextrose 10%, glucagon (human recombinant), HYDROmorphone, ondansetron, oxyCODONE, oxyCODONE, sodium chloride 0.9%     Review of patient's allergies indicates:   Allergen Reactions    Bee pollens Swelling     BEE STINGS swells body     Objective:     Vital Signs (Most Recent):  Temp: 98.4 °F (36.9 °C) (06/05/23 0724)  Pulse: 91 (06/05/23 0724)  Resp: 17 (06/05/23 0724)  BP: (!) 189/89 (06/05/23 0724)  SpO2: (!) 93 % (06/05/23 0724) Vital Signs (24h Range):  Temp:  [97.4 °F (36.3 °C)-98.5 °F (36.9 °C)] 98.4 °F (36.9 °C)  Pulse:  [73-98] 91  Resp:  [15-18] 17  SpO2:  [91 %-93 %] 93 %  BP: (120-189)/(73-89) 189/89     Weight: 102.5 kg (226 lb)  Body mass index is 30.65 kg/m².    Intake/Output - Last 3 Shifts         06/03 0700  06/04 0659 06/04 0700 06/05 0659 06/05 0700 06/06 0659    P.O. 570 240     IV Piggyback 425.8      Total Intake(mL/kg) 995.8 (9.7) 240 (2.3)     Urine (mL/kg/hr) 775 (0.3) 1540 (0.6)     Emesis/NG output       Drains 360 710     Stool 0       Total Output 1135 2250     Net -139.2 -2010            Urine Occurrence 1 x      Stool Occurrence 0 x               Physical Exam  Vitals and nursing note reviewed.   Cardiovascular:      Rate and Rhythm: Normal rate.   Pulmonary:      Effort: Pulmonary effort is normal.   Abdominal:      Tenderness: There is abdominal tenderness.      Comments: Increased tenderness especially over right hemiabdomen. Incision looks okay. Drain is thin with ascites.         Significant Labs:  I have reviewed all pertinent lab results within the past 24 hours.  CBC:   Recent Labs   Lab 06/05/23  0324   WBC 13.38*   RBC 5.56   HGB 11.7*   HCT 39.9*      MCV 72*   MCH 21.0*   MCHC 29.3*     CMP:   Recent Labs   Lab 06/05/23  0325   *   CALCIUM 8.8   ALBUMIN 2.6*   PROT 5.8*      K 3.3*   CO2 27      BUN 23   CREATININE 0.8   ALKPHOS 61   ALT 13   AST 16   BILITOT 0.8       Significant Diagnostics:  CT abdomen/pelvis pending.     Assessment/Plan:     * Acute appendicitis  66 yo M with history of liver transplant with perforated appendicitis s/p laparoscopic appendectomy and drainage of intra-abdominal abscess.    -back down to clears  -keep drain  -continue IV abx  -PRN pain and nausea meds   -added dilaudid today  -okay for tums  -home meds  -plan for CT abdomen/pelvis today with contrast  - Hepatology following for liver transplant.          Fabrizio Cantu MD  General Surgery  [facility]

## 2023-06-06 LAB
ALBUMIN SERPL BCP-MCNC: 2.4 G/DL (ref 3.5–5.2)
ALP SERPL-CCNC: 56 U/L (ref 55–135)
ALT SERPL W/O P-5'-P-CCNC: 11 U/L (ref 10–44)
ANION GAP SERPL CALC-SCNC: 9 MMOL/L (ref 8–16)
AST SERPL-CCNC: 16 U/L (ref 10–40)
BASOPHILS # BLD AUTO: 0.05 K/UL (ref 0–0.2)
BASOPHILS NFR BLD: 0.7 % (ref 0–1.9)
BILIRUB SERPL-MCNC: 0.5 MG/DL (ref 0.1–1)
BUN SERPL-MCNC: 22 MG/DL (ref 8–23)
CALCIUM SERPL-MCNC: 8.5 MG/DL (ref 8.7–10.5)
CHLORIDE SERPL-SCNC: 101 MMOL/L (ref 95–110)
CO2 SERPL-SCNC: 29 MMOL/L (ref 23–29)
CREAT SERPL-MCNC: 0.9 MG/DL (ref 0.5–1.4)
DIFFERENTIAL METHOD: ABNORMAL
EOSINOPHIL # BLD AUTO: 0.1 K/UL (ref 0–0.5)
EOSINOPHIL NFR BLD: 2 % (ref 0–8)
ERYTHROCYTE [DISTWIDTH] IN BLOOD BY AUTOMATED COUNT: 18.6 % (ref 11.5–14.5)
EST. GFR  (NO RACE VARIABLE): >60 ML/MIN/1.73 M^2
GLUCOSE SERPL-MCNC: 110 MG/DL (ref 70–110)
HCT VFR BLD AUTO: 34.6 % (ref 40–54)
HGB BLD-MCNC: 10 G/DL (ref 14–18)
IMM GRANULOCYTES # BLD AUTO: 0.17 K/UL (ref 0–0.04)
IMM GRANULOCYTES NFR BLD AUTO: 2.4 % (ref 0–0.5)
LYMPHOCYTES # BLD AUTO: 1 K/UL (ref 1–4.8)
LYMPHOCYTES NFR BLD: 13.4 % (ref 18–48)
MAGNESIUM SERPL-MCNC: 1.3 MG/DL (ref 1.6–2.6)
MCH RBC QN AUTO: 21.1 PG (ref 27–31)
MCHC RBC AUTO-ENTMCNC: 28.9 G/DL (ref 32–36)
MCV RBC AUTO: 73 FL (ref 82–98)
MONOCYTES # BLD AUTO: 1 K/UL (ref 0.3–1)
MONOCYTES NFR BLD: 13.4 % (ref 4–15)
NEUTROPHILS # BLD AUTO: 4.8 K/UL (ref 1.8–7.7)
NEUTROPHILS NFR BLD: 68.1 % (ref 38–73)
NRBC BLD-RTO: 0 /100 WBC
PHOSPHATE SERPL-MCNC: 3 MG/DL (ref 2.7–4.5)
PLATELET # BLD AUTO: ABNORMAL K/UL (ref 150–450)
PLATELET BLD QL SMEAR: ABNORMAL
PMV BLD AUTO: ABNORMAL FL (ref 9.2–12.9)
POCT GLUCOSE: 123 MG/DL (ref 70–110)
POCT GLUCOSE: 124 MG/DL (ref 70–110)
POCT GLUCOSE: 142 MG/DL (ref 70–110)
POCT GLUCOSE: 156 MG/DL (ref 70–110)
POCT GLUCOSE: 176 MG/DL (ref 70–110)
POTASSIUM SERPL-SCNC: 3.5 MMOL/L (ref 3.5–5.1)
PROT SERPL-MCNC: 5.4 G/DL (ref 6–8.4)
RBC # BLD AUTO: 4.75 M/UL (ref 4.6–6.2)
SODIUM SERPL-SCNC: 139 MMOL/L (ref 136–145)
TACROLIMUS BLD-MCNC: 5.2 NG/ML (ref 5–15)
WBC # BLD AUTO: 7.07 K/UL (ref 3.9–12.7)

## 2023-06-06 PROCEDURE — 25000003 PHARM REV CODE 250: Performed by: STUDENT IN AN ORGANIZED HEALTH CARE EDUCATION/TRAINING PROGRAM

## 2023-06-06 PROCEDURE — 25000003 PHARM REV CODE 250

## 2023-06-06 PROCEDURE — 63600175 PHARM REV CODE 636 W HCPCS: Performed by: STUDENT IN AN ORGANIZED HEALTH CARE EDUCATION/TRAINING PROGRAM

## 2023-06-06 PROCEDURE — 11000001 HC ACUTE MED/SURG PRIVATE ROOM

## 2023-06-06 PROCEDURE — 63600175 PHARM REV CODE 636 W HCPCS

## 2023-06-06 PROCEDURE — 85025 COMPLETE CBC W/AUTO DIFF WBC: CPT | Performed by: STUDENT IN AN ORGANIZED HEALTH CARE EDUCATION/TRAINING PROGRAM

## 2023-06-06 PROCEDURE — 83735 ASSAY OF MAGNESIUM: CPT | Performed by: STUDENT IN AN ORGANIZED HEALTH CARE EDUCATION/TRAINING PROGRAM

## 2023-06-06 PROCEDURE — 80197 ASSAY OF TACROLIMUS: CPT | Performed by: STUDENT IN AN ORGANIZED HEALTH CARE EDUCATION/TRAINING PROGRAM

## 2023-06-06 PROCEDURE — 84100 ASSAY OF PHOSPHORUS: CPT | Performed by: STUDENT IN AN ORGANIZED HEALTH CARE EDUCATION/TRAINING PROGRAM

## 2023-06-06 PROCEDURE — 80053 COMPREHEN METABOLIC PANEL: CPT | Performed by: STUDENT IN AN ORGANIZED HEALTH CARE EDUCATION/TRAINING PROGRAM

## 2023-06-06 PROCEDURE — 36415 COLL VENOUS BLD VENIPUNCTURE: CPT | Performed by: STUDENT IN AN ORGANIZED HEALTH CARE EDUCATION/TRAINING PROGRAM

## 2023-06-06 RX ORDER — HYDROMORPHONE HYDROCHLORIDE 1 MG/ML
0.5 INJECTION, SOLUTION INTRAMUSCULAR; INTRAVENOUS; SUBCUTANEOUS EVERY 4 HOURS PRN
Status: DISCONTINUED | OUTPATIENT
Start: 2023-06-06 | End: 2023-06-08

## 2023-06-06 RX ORDER — SODIUM CHLORIDE, SODIUM LACTATE, POTASSIUM CHLORIDE, CALCIUM CHLORIDE 600; 310; 30; 20 MG/100ML; MG/100ML; MG/100ML; MG/100ML
INJECTION, SOLUTION INTRAVENOUS CONTINUOUS
Status: DISCONTINUED | OUTPATIENT
Start: 2023-06-06 | End: 2023-06-07

## 2023-06-06 RX ADMIN — ACETAMINOPHEN 1000 MG: 500 TABLET ORAL at 05:06

## 2023-06-06 RX ADMIN — ACETAMINOPHEN 1000 MG: 500 TABLET ORAL at 09:06

## 2023-06-06 RX ADMIN — HYDROMORPHONE HYDROCHLORIDE 0.5 MG: 1 INJECTION, SOLUTION INTRAMUSCULAR; INTRAVENOUS; SUBCUTANEOUS at 12:06

## 2023-06-06 RX ADMIN — TACROLIMUS 0.5 MG: 0.5 CAPSULE ORAL at 05:06

## 2023-06-06 RX ADMIN — SERTRALINE HYDROCHLORIDE 25 MG: 25 TABLET ORAL at 08:06

## 2023-06-06 RX ADMIN — ONDANSETRON 4 MG: 2 INJECTION INTRAMUSCULAR; INTRAVENOUS at 09:06

## 2023-06-06 RX ADMIN — PIPERACILLIN AND TAZOBACTAM 4.5 G: 4; .5 INJECTION, POWDER, LYOPHILIZED, FOR SOLUTION INTRAVENOUS; PARENTERAL at 05:06

## 2023-06-06 RX ADMIN — HYDROMORPHONE HYDROCHLORIDE 0.5 MG: 1 INJECTION, SOLUTION INTRAMUSCULAR; INTRAVENOUS; SUBCUTANEOUS at 05:06

## 2023-06-06 RX ADMIN — SODIUM CHLORIDE, POTASSIUM CHLORIDE, SODIUM LACTATE AND CALCIUM CHLORIDE: 600; 310; 30; 20 INJECTION, SOLUTION INTRAVENOUS at 11:06

## 2023-06-06 RX ADMIN — GABAPENTIN 300 MG: 300 CAPSULE ORAL at 02:06

## 2023-06-06 RX ADMIN — AMLODIPINE BESYLATE 10 MG: 5 TABLET ORAL at 08:06

## 2023-06-06 RX ADMIN — PIPERACILLIN AND TAZOBACTAM 4.5 G: 4; .5 INJECTION, POWDER, LYOPHILIZED, FOR SOLUTION INTRAVENOUS; PARENTERAL at 02:06

## 2023-06-06 RX ADMIN — APIXABAN 5 MG: 5 TABLET, FILM COATED ORAL at 08:06

## 2023-06-06 RX ADMIN — OXYCODONE HYDROCHLORIDE 10 MG: 10 TABLET ORAL at 08:06

## 2023-06-06 RX ADMIN — GABAPENTIN 300 MG: 300 CAPSULE ORAL at 09:06

## 2023-06-06 RX ADMIN — PIPERACILLIN AND TAZOBACTAM 4.5 G: 4; .5 INJECTION, POWDER, LYOPHILIZED, FOR SOLUTION INTRAVENOUS; PARENTERAL at 10:06

## 2023-06-06 RX ADMIN — GABAPENTIN 300 MG: 300 CAPSULE ORAL at 08:06

## 2023-06-06 RX ADMIN — APIXABAN 5 MG: 5 TABLET, FILM COATED ORAL at 09:06

## 2023-06-06 RX ADMIN — AMIODARONE HYDROCHLORIDE 200 MG: 200 TABLET ORAL at 08:06

## 2023-06-06 RX ADMIN — ASPIRIN 81 MG: 81 TABLET, COATED ORAL at 08:06

## 2023-06-06 RX ADMIN — TACROLIMUS 0.5 MG: 0.5 CAPSULE ORAL at 08:06

## 2023-06-06 RX ADMIN — ATORVASTATIN CALCIUM 20 MG: 20 TABLET, FILM COATED ORAL at 09:06

## 2023-06-06 NOTE — TREATMENT PLAN
Hepatology Treatment Plan    Tej Purdy is a 67 y.o. male admitted to hospital 6/1/2023 (Hospital Day: 6) due to Acute appendicitis.     Interval History  NAEON    Objective  Temp:  [97.1 °F (36.2 °C)-98.7 °F (37.1 °C)] 98.1 °F (36.7 °C) (06/06 1141)  Pulse:  [56-84] 84 (06/06 1141)  BP: (122-200)/(64-87) 144/70 (06/06 1141)  Resp:  [15-18] 17 (06/06 1141)  SpO2:  [91 %-94 %] 94 % (06/06 1152)    Laboratory    Lab Results   Component Value Date    WBC 7.07 06/06/2023    HGB 10.0 (L) 06/06/2023    HCT 34.6 (L) 06/06/2023    MCV 73 (L) 06/06/2023    PLT SEE COMMENT 06/06/2023       Lab Results   Component Value Date     06/06/2023    K 3.5 06/06/2023     06/06/2023    CO2 29 06/06/2023    BUN 22 06/06/2023    CREATININE 0.9 06/06/2023    CALCIUM 8.5 (L) 06/06/2023       Lab Results   Component Value Date    ALBUMIN 2.4 (L) 06/06/2023    ALT 11 06/06/2023    AST 16 06/06/2023    GGT 48 02/25/2021    ALKPHOS 56 06/06/2023    BILITOT 0.5 06/06/2023       Lab Results   Component Value Date    INR 1.3 (H) 06/01/2023    INR 1.2 02/01/2022    INR 1.2 01/13/2022       MELD-Na: 14 at 6/3/2023  4:18 AM  MELD: 13 at 6/3/2023  4:18 AM  Calculated from:  Serum Creatinine: 1.4 mg/dL at 6/3/2023  4:18 AM  Serum Sodium: 136 mmol/L at 6/3/2023  4:18 AM  Total Bilirubin: 0.5 mg/dL (Using min of 1 mg/dL) at 6/3/2023  4:18 AM  INR(ratio): 1.3 at 6/1/2023 11:09 PM      Assessment  This is a 67 year old male with PMH significant for cholangiocarcinoma (status post liver transplant in 01/2022), aortic stenosis, atrial fibrillation (on Apixaban), and peripheral artery disease (on ASA) who was admitted to [facility] on 06/01 for management of acute appendicitis. He is status post appendectomy on 06/02 showing perforated appendix with intra-abdominal abscess; abdominal drain placed.Hepatology consulted for management of immunosuppression. LFTs normal.      Recommendations:     -Tacrolimus 0.5 mg BID.   -CMP, INR, and Tacrolimus  level daily.     Thank you for involving us in the care of Tej STUART Hungdelfina. Please call with any additional concerns or questions.        Patrick Palomo MD, PGY-V  Gastroenterology Fellow  [facility]

## 2023-06-06 NOTE — SUBJECTIVE & OBJECTIVE
Interval History: No acute events overnight. Did well with clears. No nausea or vomiting. Having bowel function.     Medications:  Continuous Infusions:  Scheduled Meds:   acetaminophen  1,000 mg Oral Q8H    amiodarone  200 mg Oral Daily    amLODIPine  10 mg Oral Daily    apixaban  5 mg Oral BID    aspirin  81 mg Oral Daily    atorvastatin  20 mg Oral QHS    gabapentin  300 mg Oral TID    insulin aspart U-100  0-5 Units Subcutaneous TIDWM    piperacillin-tazobactam (ZOSYN) IVPB  4.5 g Intravenous Q8H    sertraline  25 mg Oral Daily    tacrolimus  0.5 mg Oral BID     PRN Meds:calcium carbonate, dextrose 10%, dextrose 10%, glucagon (human recombinant), HYDROmorphone, ondansetron, oxyCODONE, oxyCODONE, sodium chloride 0.9%     Review of patient's allergies indicates:   Allergen Reactions    Bee pollens Swelling     BEE STINGS swells body     Objective:     Vital Signs (Most Recent):  Temp: 97.1 °F (36.2 °C) (06/06/23 0351)  Pulse: (!) 56 (06/06/23 0351)  Resp: 16 (06/06/23 0503)  BP: 137/64 (06/06/23 0351)  SpO2: (!) 94 % (06/06/23 0351) Vital Signs (24h Range):  Temp:  [97.1 °F (36.2 °C)-97.9 °F (36.6 °C)] 97.1 °F (36.2 °C)  Pulse:  [56-81] 56  Resp:  [15-18] 16  SpO2:  [89 %-94 %] 94 %  BP: (122-179)/(64-82) 137/64     Weight: 102.5 kg (226 lb)  Body mass index is 30.65 kg/m².    Intake/Output - Last 3 Shifts         06/04 0700  06/05 0659 06/05 0700  06/06 0659 06/06 0700  06/07 0659    P.O. 240      IV Piggyback       Total Intake(mL/kg) 240 (2.3)      Urine (mL/kg/hr) 1540 (0.6) 250 (0.1)     Drains 710 320     Stool       Total Output 2250 570     Net -2010 -570                     Physical Exam  Vitals and nursing note reviewed.   Cardiovascular:      Rate and Rhythm: Normal rate.   Pulmonary:      Effort: Pulmonary effort is normal.   Abdominal:      Tenderness: There is abdominal tenderness.      Comments: mild tenderness especially over right hemiabdomen. Incision looks okay. Drain is thin with ascites.          Significant Labs:  I have reviewed all pertinent lab results within the past 24 hours.  CBC:   Recent Labs   Lab 06/05/23  0324 06/06/23 0416   WBC 13.38* 7.07   RBC 5.56 4.75   HGB 11.7* 10.0*   HCT 39.9* 34.6*     --    MCV 72* 73*   MCH 21.0* 21.1*   MCHC 29.3* 28.9*     CMP:   Recent Labs   Lab 06/06/23 0416      CALCIUM 8.5*   ALBUMIN 2.4*   PROT 5.4*      K 3.5   CO2 29      BUN 22   CREATININE 0.9   ALKPHOS 56   ALT 11   AST 16   BILITOT 0.5       Significant Diagnostics:  none

## 2023-06-06 NOTE — NURSING
Pt began to vomit large amount when MD was at bedside pulling out DANNIELLE drain. MD ordered NG tube, but pt is refusing and getting upset about having an NG tube when he does not feel nauseous or vomiting anymore. Notified MD Summers, Still wants to get KUB even if refusing NG tube.

## 2023-06-06 NOTE — PT/OT/SLP PROGRESS
"Occupational Therapy      Patient Name:  Tej Purdy   MRN:  [id number]    Patient not seen today secondary to Increased agitation when presented with therapy including ADL re-training. Patient began raising voice stating, "I do not feel like answering all these questions right now-- go ask the medical doctor." Will follow-up next scheduled visit per OT POC.    6/6/2023  "

## 2023-06-06 NOTE — ASSESSMENT & PLAN NOTE
68 yo M with history of liver transplant with perforated appendicitis s/p laparoscopic appendectomy and drainage of intra-abdominal abscess.    -regular diet  -d/c drain  -d/c IV abx, switch to PO augmentin   -possible home later today  -back down to clears  -PRN pain and nausea meds  -okay for tums  -home meds  - Hepatology following for liver transplant.

## 2023-06-06 NOTE — PLAN OF CARE
Pt is AAOX4,VSS.Pain management has been assessed. Pt reports pain, PRN meds are given and pain is reassessed. Vomited X 1 today when DANNIELLE drain was removed. MD ordered NG tube, but pt refused. NPO for now. Ambulated in hallway. Fall precautions in place, no report of falls. Safety measures are in place bed in lowest position, wheels locked, call light within reach.

## 2023-06-06 NOTE — PROGRESS NOTES
[facility]  General Surgery  Progress Note    Subjective:     Post-Op Info:  Procedure(s) (LRB):  APPENDECTOMY, LAPAROSCOPIC (N/A)   4 Days Post-Op     Interval History: No acute events overnight. Did well with clears. No nausea or vomiting. Having bowel function.     Medications:  Continuous Infusions:  Scheduled Meds:   acetaminophen  1,000 mg Oral Q8H    amiodarone  200 mg Oral Daily    amLODIPine  10 mg Oral Daily    apixaban  5 mg Oral BID    aspirin  81 mg Oral Daily    atorvastatin  20 mg Oral QHS    gabapentin  300 mg Oral TID    insulin aspart U-100  0-5 Units Subcutaneous TIDWM    piperacillin-tazobactam (ZOSYN) IVPB  4.5 g Intravenous Q8H    sertraline  25 mg Oral Daily    tacrolimus  0.5 mg Oral BID     PRN Meds:calcium carbonate, dextrose 10%, dextrose 10%, glucagon (human recombinant), HYDROmorphone, ondansetron, oxyCODONE, oxyCODONE, sodium chloride 0.9%     Review of patient's allergies indicates:   Allergen Reactions    Bee pollens Swelling     BEE STINGS swells body     Objective:     Vital Signs (Most Recent):  Temp: 97.1 °F (36.2 °C) (06/06/23 0351)  Pulse: (!) 56 (06/06/23 0351)  Resp: 16 (06/06/23 0503)  BP: 137/64 (06/06/23 0351)  SpO2: (!) 94 % (06/06/23 0351) Vital Signs (24h Range):  Temp:  [97.1 °F (36.2 °C)-97.9 °F (36.6 °C)] 97.1 °F (36.2 °C)  Pulse:  [56-81] 56  Resp:  [15-18] 16  SpO2:  [89 %-94 %] 94 %  BP: (122-179)/(64-82) 137/64     Weight: 102.5 kg (226 lb)  Body mass index is 30.65 kg/m².    Intake/Output - Last 3 Shifts         06/04 0700  06/05 0659 06/05 0700  06/06 0659 06/06 0700  06/07 0659    P.O. 240      IV Piggyback       Total Intake(mL/kg) 240 (2.3)      Urine (mL/kg/hr) 1540 (0.6) 250 (0.1)     Drains 710 320     Stool       Total Output 2250 570     Net -2010 -570                     Physical Exam  Vitals and nursing note reviewed.   Cardiovascular:      Rate and Rhythm: Normal rate.   Pulmonary:      Effort: Pulmonary effort is normal.    Abdominal:      Tenderness: There is abdominal tenderness.      Comments: mild tenderness especially over right hemiabdomen. Incision looks okay. Drain is thin with ascites.         Significant Labs:  I have reviewed all pertinent lab results within the past 24 hours.  CBC:   Recent Labs   Lab 06/05/23  0324 06/06/23 0416   WBC 13.38* 7.07   RBC 5.56 4.75   HGB 11.7* 10.0*   HCT 39.9* 34.6*     --    MCV 72* 73*   MCH 21.0* 21.1*   MCHC 29.3* 28.9*     CMP:   Recent Labs   Lab 06/06/23 0416      CALCIUM 8.5*   ALBUMIN 2.4*   PROT 5.4*      K 3.5   CO2 29      BUN 22   CREATININE 0.9   ALKPHOS 56   ALT 11   AST 16   BILITOT 0.5       Significant Diagnostics:  none    Assessment/Plan:     * Acute appendicitis  68 yo M with history of liver transplant with perforated appendicitis s/p laparoscopic appendectomy and drainage of intra-abdominal abscess.    -regular diet  -d/c drain  -d/c IV abx, switch to PO augmentin   -possible home later today  -back down to clears  -PRN pain and nausea meds  -okay for tums  -home meds  - Hepatology following for liver transplant.          Fabrizio Cantu MD  General Surgery  [facility]

## 2023-06-07 LAB
ALBUMIN SERPL BCP-MCNC: 2.3 G/DL (ref 3.5–5.2)
ALP SERPL-CCNC: 56 U/L (ref 55–135)
ALT SERPL W/O P-5'-P-CCNC: 10 U/L (ref 10–44)
ANION GAP SERPL CALC-SCNC: 11 MMOL/L (ref 8–16)
ANISOCYTOSIS BLD QL SMEAR: SLIGHT
AST SERPL-CCNC: 16 U/L (ref 10–40)
BACTERIA BLD CULT: NORMAL
BACTERIA BLD CULT: NORMAL
BASOPHILS # BLD AUTO: ABNORMAL K/UL (ref 0–0.2)
BASOPHILS NFR BLD: 0 % (ref 0–1.9)
BILIRUB SERPL-MCNC: 0.4 MG/DL (ref 0.1–1)
BUN SERPL-MCNC: 21 MG/DL (ref 8–23)
CALCIUM SERPL-MCNC: 8.7 MG/DL (ref 8.7–10.5)
CHLORIDE SERPL-SCNC: 101 MMOL/L (ref 95–110)
CO2 SERPL-SCNC: 27 MMOL/L (ref 23–29)
CREAT SERPL-MCNC: 0.8 MG/DL (ref 0.5–1.4)
DIFFERENTIAL METHOD: ABNORMAL
EOSINOPHIL # BLD AUTO: ABNORMAL K/UL (ref 0–0.5)
EOSINOPHIL NFR BLD: 4 % (ref 0–8)
ERYTHROCYTE [DISTWIDTH] IN BLOOD BY AUTOMATED COUNT: 18.9 % (ref 11.5–14.5)
EST. GFR  (NO RACE VARIABLE): >60 ML/MIN/1.73 M^2
GLUCOSE SERPL-MCNC: 111 MG/DL (ref 70–110)
HCT VFR BLD AUTO: 33.9 % (ref 40–54)
HGB BLD-MCNC: 9.7 G/DL (ref 14–18)
IMM GRANULOCYTES # BLD AUTO: ABNORMAL K/UL (ref 0–0.04)
IMM GRANULOCYTES NFR BLD AUTO: ABNORMAL % (ref 0–0.5)
LYMPHOCYTES # BLD AUTO: ABNORMAL K/UL (ref 1–4.8)
LYMPHOCYTES NFR BLD: 12 % (ref 18–48)
MAGNESIUM SERPL-MCNC: 1.5 MG/DL (ref 1.6–2.6)
MCH RBC QN AUTO: 20.7 PG (ref 27–31)
MCHC RBC AUTO-ENTMCNC: 28.6 G/DL (ref 32–36)
MCV RBC AUTO: 72 FL (ref 82–98)
METAMYELOCYTES NFR BLD MANUAL: 1 %
MONOCYTES # BLD AUTO: ABNORMAL K/UL (ref 0.3–1)
MONOCYTES NFR BLD: 7 % (ref 4–15)
MYELOCYTES NFR BLD MANUAL: 3 %
NEUTROPHILS NFR BLD: 71 % (ref 38–73)
NEUTS BAND NFR BLD MANUAL: 2 %
NRBC BLD-RTO: 0 /100 WBC
OVALOCYTES BLD QL SMEAR: ABNORMAL
PHOSPHATE SERPL-MCNC: 2.9 MG/DL (ref 2.7–4.5)
PLATELET # BLD AUTO: ABNORMAL K/UL (ref 150–450)
PLATELET BLD QL SMEAR: ABNORMAL
PMV BLD AUTO: ABNORMAL FL (ref 9.2–12.9)
POCT GLUCOSE: 119 MG/DL (ref 70–110)
POCT GLUCOSE: 123 MG/DL (ref 70–110)
POIKILOCYTOSIS BLD QL SMEAR: SLIGHT
POTASSIUM SERPL-SCNC: 3.2 MMOL/L (ref 3.5–5.1)
PROT SERPL-MCNC: 5.3 G/DL (ref 6–8.4)
RBC # BLD AUTO: 4.68 M/UL (ref 4.6–6.2)
SODIUM SERPL-SCNC: 139 MMOL/L (ref 136–145)
TACROLIMUS BLD-MCNC: 5.4 NG/ML (ref 5–15)
WBC # BLD AUTO: 8.11 K/UL (ref 3.9–12.7)

## 2023-06-07 PROCEDURE — 63600175 PHARM REV CODE 636 W HCPCS: Performed by: STUDENT IN AN ORGANIZED HEALTH CARE EDUCATION/TRAINING PROGRAM

## 2023-06-07 PROCEDURE — 84100 ASSAY OF PHOSPHORUS: CPT | Performed by: STUDENT IN AN ORGANIZED HEALTH CARE EDUCATION/TRAINING PROGRAM

## 2023-06-07 PROCEDURE — 25000003 PHARM REV CODE 250: Performed by: STUDENT IN AN ORGANIZED HEALTH CARE EDUCATION/TRAINING PROGRAM

## 2023-06-07 PROCEDURE — 63600175 PHARM REV CODE 636 W HCPCS

## 2023-06-07 PROCEDURE — 25000003 PHARM REV CODE 250

## 2023-06-07 PROCEDURE — 36415 COLL VENOUS BLD VENIPUNCTURE: CPT | Performed by: STUDENT IN AN ORGANIZED HEALTH CARE EDUCATION/TRAINING PROGRAM

## 2023-06-07 PROCEDURE — 80197 ASSAY OF TACROLIMUS: CPT | Performed by: STUDENT IN AN ORGANIZED HEALTH CARE EDUCATION/TRAINING PROGRAM

## 2023-06-07 PROCEDURE — 85027 COMPLETE CBC AUTOMATED: CPT | Performed by: STUDENT IN AN ORGANIZED HEALTH CARE EDUCATION/TRAINING PROGRAM

## 2023-06-07 PROCEDURE — 97530 THERAPEUTIC ACTIVITIES: CPT

## 2023-06-07 PROCEDURE — 80053 COMPREHEN METABOLIC PANEL: CPT | Performed by: STUDENT IN AN ORGANIZED HEALTH CARE EDUCATION/TRAINING PROGRAM

## 2023-06-07 PROCEDURE — 83735 ASSAY OF MAGNESIUM: CPT | Performed by: STUDENT IN AN ORGANIZED HEALTH CARE EDUCATION/TRAINING PROGRAM

## 2023-06-07 PROCEDURE — 85007 BL SMEAR W/DIFF WBC COUNT: CPT | Performed by: STUDENT IN AN ORGANIZED HEALTH CARE EDUCATION/TRAINING PROGRAM

## 2023-06-07 PROCEDURE — 11000001 HC ACUTE MED/SURG PRIVATE ROOM

## 2023-06-07 RX ORDER — LANOLIN ALCOHOL/MO/W.PET/CERES
800 CREAM (GRAM) TOPICAL ONCE
Status: COMPLETED | OUTPATIENT
Start: 2023-06-07 | End: 2023-06-07

## 2023-06-07 RX ORDER — AMOXICILLIN AND CLAVULANATE POTASSIUM 875; 125 MG/1; MG/1
1 TABLET, FILM COATED ORAL EVERY 12 HOURS
Status: DISCONTINUED | OUTPATIENT
Start: 2023-06-07 | End: 2023-06-08 | Stop reason: HOSPADM

## 2023-06-07 RX ORDER — POTASSIUM CHLORIDE 20 MEQ/1
40 TABLET, EXTENDED RELEASE ORAL
Status: COMPLETED | OUTPATIENT
Start: 2023-06-07 | End: 2023-06-07

## 2023-06-07 RX ADMIN — GABAPENTIN 300 MG: 300 CAPSULE ORAL at 08:06

## 2023-06-07 RX ADMIN — SERTRALINE HYDROCHLORIDE 25 MG: 25 TABLET ORAL at 08:06

## 2023-06-07 RX ADMIN — AMOXICILLIN AND CLAVULANATE POTASSIUM 1 TABLET: 875; 125 TABLET, FILM COATED ORAL at 11:06

## 2023-06-07 RX ADMIN — HYDROMORPHONE HYDROCHLORIDE 0.5 MG: 1 INJECTION, SOLUTION INTRAMUSCULAR; INTRAVENOUS; SUBCUTANEOUS at 06:06

## 2023-06-07 RX ADMIN — AMIODARONE HYDROCHLORIDE 200 MG: 200 TABLET ORAL at 08:06

## 2023-06-07 RX ADMIN — APIXABAN 5 MG: 5 TABLET, FILM COATED ORAL at 08:06

## 2023-06-07 RX ADMIN — ATORVASTATIN CALCIUM 20 MG: 20 TABLET, FILM COATED ORAL at 09:06

## 2023-06-07 RX ADMIN — HYDROMORPHONE HYDROCHLORIDE 0.5 MG: 1 INJECTION, SOLUTION INTRAMUSCULAR; INTRAVENOUS; SUBCUTANEOUS at 11:06

## 2023-06-07 RX ADMIN — AMLODIPINE BESYLATE 10 MG: 5 TABLET ORAL at 08:06

## 2023-06-07 RX ADMIN — ACETAMINOPHEN 1000 MG: 500 TABLET ORAL at 09:06

## 2023-06-07 RX ADMIN — GABAPENTIN 300 MG: 300 CAPSULE ORAL at 09:06

## 2023-06-07 RX ADMIN — GABAPENTIN 300 MG: 300 CAPSULE ORAL at 03:06

## 2023-06-07 RX ADMIN — ASPIRIN 81 MG: 81 TABLET, COATED ORAL at 08:06

## 2023-06-07 RX ADMIN — ACETAMINOPHEN 1000 MG: 500 TABLET ORAL at 05:06

## 2023-06-07 RX ADMIN — AMOXICILLIN AND CLAVULANATE POTASSIUM 1 TABLET: 875; 125 TABLET, FILM COATED ORAL at 09:06

## 2023-06-07 RX ADMIN — POTASSIUM CHLORIDE 40 MEQ: 1500 TABLET, EXTENDED RELEASE ORAL at 12:06

## 2023-06-07 RX ADMIN — Medication 800 MG: at 10:06

## 2023-06-07 RX ADMIN — ACETAMINOPHEN 1000 MG: 500 TABLET ORAL at 03:06

## 2023-06-07 RX ADMIN — PIPERACILLIN AND TAZOBACTAM 4.5 G: 4; .5 INJECTION, POWDER, LYOPHILIZED, FOR SOLUTION INTRAVENOUS; PARENTERAL at 05:06

## 2023-06-07 RX ADMIN — TACROLIMUS 0.5 MG: 0.5 CAPSULE ORAL at 06:06

## 2023-06-07 RX ADMIN — POTASSIUM CHLORIDE 40 MEQ: 1500 TABLET, EXTENDED RELEASE ORAL at 10:06

## 2023-06-07 RX ADMIN — TACROLIMUS 0.5 MG: 0.5 CAPSULE ORAL at 08:06

## 2023-06-07 RX ADMIN — APIXABAN 5 MG: 5 TABLET, FILM COATED ORAL at 09:06

## 2023-06-07 NOTE — SUBJECTIVE & OBJECTIVE
Interval History: Patient upset due to NPO status. Passing flatus. Denies nausea/vomiting since the drain was pulled. Pain controlled.     Medications:  Continuous Infusions:   lactated ringers 50 mL/hr at 06/06/23 1113     Scheduled Meds:   acetaminophen  1,000 mg Oral Q8H    amiodarone  200 mg Oral Daily    amLODIPine  10 mg Oral Daily    apixaban  5 mg Oral BID    aspirin  81 mg Oral Daily    atorvastatin  20 mg Oral QHS    gabapentin  300 mg Oral TID    insulin aspart U-100  0-5 Units Subcutaneous TIDWM    piperacillin-tazobactam (Zosyn) IV (PEDS and ADULTS) (extended infusion is not appropriate)  4.5 g Intravenous Q8H    sertraline  25 mg Oral Daily    tacrolimus  0.5 mg Oral BID     PRN Meds:calcium carbonate, dextrose 10%, dextrose 10%, glucagon (human recombinant), HYDROmorphone, ondansetron, oxyCODONE, oxyCODONE, sodium chloride 0.9%     Review of patient's allergies indicates:   Allergen Reactions    Bee pollens Swelling     BEE STINGS swells body     Objective:     Vital Signs (Most Recent):  Temp: 98.3 °F (36.8 °C) (06/07/23 0705)  Pulse: 74 (06/07/23 0705)  Resp: 18 (06/07/23 0705)  BP: (!) 145/67 (06/07/23 0705)  SpO2: (!) 90 % (06/07/23 0705) Vital Signs (24h Range):  Temp:  [97.5 °F (36.4 °C)-98.3 °F (36.8 °C)] 98.3 °F (36.8 °C)  Pulse:  [67-88] 74  Resp:  [17-18] 18  SpO2:  [90 %-96 %] 90 %  BP: (134-179)/(58-88) 145/67     Weight: 102.5 kg (226 lb)  Body mass index is 30.65 kg/m².    Intake/Output - Last 3 Shifts         06/05 0700  06/06 0659 06/06 0700  06/07 0659 06/07 0700  06/08 0659    P.O.       Total Intake(mL/kg)       Urine (mL/kg/hr) 250 (0.1) 650 (0.3)     Emesis/NG output  0     Drains 320 90     Total Output 570 740     Net -570 -740            Emesis Occurrence  1 x              Physical Exam  Vitals and nursing note reviewed.   Constitutional:       Appearance: He is not ill-appearing.   Eyes:      Pupils: Pupils are equal, round, and reactive to light.   Cardiovascular:      Rate  and Rhythm: Normal rate.   Pulmonary:      Effort: Pulmonary effort is normal. No respiratory distress.      Breath sounds: No wheezing.   Abdominal:      General: Abdomen is flat.      Tenderness: There is abdominal tenderness.      Comments: mild tenderness. Incisions healing well. Drain site with dressing in place.   Musculoskeletal:         General: Normal range of motion.      Cervical back: Normal range of motion.   Skin:     General: Skin is warm and dry.   Neurological:      General: No focal deficit present.        Significant Labs:  I have reviewed all pertinent lab results within the past 24 hours.  CBC:   Recent Labs   Lab 06/07/23  0530   WBC 8.11   RBC 4.68   HGB 9.7*   HCT 33.9*   PLT SEE COMMENT   MCV 72*   MCH 20.7*   MCHC 28.6*     CMP:   Recent Labs   Lab 06/07/23  0530   *   CALCIUM 8.7   ALBUMIN 2.3*   PROT 5.3*      K 3.2*   CO2 27      BUN 21   CREATININE 0.8   ALKPHOS 56   ALT 10   AST 16   BILITOT 0.4       Significant Diagnostics:  I have reviewed all pertinent imaging results/findings within the past 24 hours.

## 2023-06-07 NOTE — PROGRESS NOTES
[facility] - Surgery  General Surgery  Progress Note    Subjective:     History of Present Illness:  No notes on file    Post-Op Info:  Procedure(s) (LRB):  APPENDECTOMY, LAPAROSCOPIC (N/A)   5 Days Post-Op     Interval History: Patient upset due to NPO status. Passing flatus. Denies nausea/vomiting since the drain was pulled. Pain controlled.     Medications:  Continuous Infusions:   lactated ringers 50 mL/hr at 06/06/23 1113     Scheduled Meds:   acetaminophen  1,000 mg Oral Q8H    amiodarone  200 mg Oral Daily    amLODIPine  10 mg Oral Daily    apixaban  5 mg Oral BID    aspirin  81 mg Oral Daily    atorvastatin  20 mg Oral QHS    gabapentin  300 mg Oral TID    insulin aspart U-100  0-5 Units Subcutaneous TIDWM    piperacillin-tazobactam (Zosyn) IV (PEDS and ADULTS) (extended infusion is not appropriate)  4.5 g Intravenous Q8H    sertraline  25 mg Oral Daily    tacrolimus  0.5 mg Oral BID     PRN Meds:calcium carbonate, dextrose 10%, dextrose 10%, glucagon (human recombinant), HYDROmorphone, ondansetron, oxyCODONE, oxyCODONE, sodium chloride 0.9%     Review of patient's allergies indicates:   Allergen Reactions    Bee pollens Swelling     BEE STINGS swells body     Objective:     Vital Signs (Most Recent):  Temp: 98.3 °F (36.8 °C) (06/07/23 0705)  Pulse: 74 (06/07/23 0705)  Resp: 18 (06/07/23 0705)  BP: (!) 145/67 (06/07/23 0705)  SpO2: (!) 90 % (06/07/23 0705) Vital Signs (24h Range):  Temp:  [97.5 °F (36.4 °C)-98.3 °F (36.8 °C)] 98.3 °F (36.8 °C)  Pulse:  [67-88] 74  Resp:  [17-18] 18  SpO2:  [90 %-96 %] 90 %  BP: (134-179)/(58-88) 145/67     Weight: 102.5 kg (226 lb)  Body mass index is 30.65 kg/m².    Intake/Output - Last 3 Shifts         06/05 0700 06/06 0659 06/06 0700 06/07 0659 06/07 0700  06/08 0659    P.O.       Total Intake(mL/kg)       Urine (mL/kg/hr) 250 (0.1) 650 (0.3)     Emesis/NG output  0     Drains 320 90     Total Output 570 740     Net -570 -740            Emesis Occurrence  1 x               Physical Exam  Vitals and nursing note reviewed.   Constitutional:       Appearance: He is not ill-appearing.   Eyes:      Pupils: Pupils are equal, round, and reactive to light.   Cardiovascular:      Rate and Rhythm: Normal rate.   Pulmonary:      Effort: Pulmonary effort is normal. No respiratory distress.      Breath sounds: No wheezing.   Abdominal:      General: Abdomen is flat.      Tenderness: There is abdominal tenderness.      Comments: mild tenderness. Incisions healing well. Drain site with dressing in place.   Musculoskeletal:         General: Normal range of motion.      Cervical back: Normal range of motion.   Skin:     General: Skin is warm and dry.   Neurological:      General: No focal deficit present.        Significant Labs:  I have reviewed all pertinent lab results within the past 24 hours.  CBC:   Recent Labs   Lab 06/07/23  0530   WBC 8.11   RBC 4.68   HGB 9.7*   HCT 33.9*   PLT SEE COMMENT   MCV 72*   MCH 20.7*   MCHC 28.6*     CMP:   Recent Labs   Lab 06/07/23  0530   *   CALCIUM 8.7   ALBUMIN 2.3*   PROT 5.3*      K 3.2*   CO2 27      BUN 21   CREATININE 0.8   ALKPHOS 56   ALT 10   AST 16   BILITOT 0.4       Significant Diagnostics:  I have reviewed all pertinent imaging results/findings within the past 24 hours.    Assessment/Plan:     * Acute appendicitis  66 yo M with history of liver transplant with perforated appendicitis s/p laparoscopic appendectomy and drainage of intra-abdominal abscess.    -CLD  -PO augmentin   -mIVF DC'd  -PRN pain and nausea meds  -okay for tums  -home meds  -Hepatology following for liver transplant  -Daily labs    Dispo: Awaiting LM Mahajan MD  General Surgery  [facility] - Surgery

## 2023-06-07 NOTE — ASSESSMENT & PLAN NOTE
68 yo M with history of liver transplant with perforated appendicitis s/p laparoscopic appendectomy and drainage of intra-abdominal abscess.    -CLD  -PO augmentin   -mIVF DC'd  -PRN pain and nausea meds  -okay for tums  -home meds  -Hepatology following for liver transplant  -Daily labs    Dispo: Awaiting ROBF

## 2023-06-07 NOTE — PT/OT/SLP PROGRESS
Occupational Therapy   Treatment    Name: Tej Purdy  MRN: [id number]  Admitting Diagnosis:  Acute appendicitis  5 Days Post-Op    Recommendations:     Discharge Recommendations: other (see comments)  Discharge Equipment Recommendations:  none  Barriers to discharge:  None    Assessment:     Tej Purdy is a 67 y.o. male with a medical diagnosis of Acute appendicitis.  He presents with no c/o pain however concern about his liquid diet. Performance deficits affecting function are weakness, impaired endurance, impaired self care skills, gait instability, impaired functional mobility.     Rehab Prognosis:  Good; patient would benefit from acute skilled OT services to address these deficits and reach maximum level of function.       Plan:     Patient to be seen 3 x/week to address the above listed problems via self-care/home management, neuromuscular re-education, therapeutic exercises, therapeutic activities  Plan of Care Expires: 07/05/23  Plan of Care Reviewed with: patient    Subjective     Chief Complaint: C/o liquid   Patient/Family Comments/goals: Pt. Reports he would like to just take a walk  Pain/Comfort:  Pain Rating 1:  (did not rate)    Objective:     Communicated with: RN prior to session.  Patient found HOB elevated with  (no active line) upon OT entry to room.    General Precautions: Standard, fall, hearing impaired    Orthopedic Precautions:N/A  Braces: N/A  Respiratory Status: Room air     Occupational Performance:     Bed Mobility:    Patient completed Supine to Sit with supervision  Patient completed Sit to Supine with supervision     Functional Mobility/Transfers:  Patient completed Sit <> Stand Transfer with supervision  with  no assistive device   Functional Mobility: Pt demonstrated functional mobility training to simulate household of ~100 ft with No AD with Stand By Assistance  and no LOB a    Activities of Daily Living:  - Upper Body Dressing: supervision don personal robe standing at EOB      [facility] 6 Click ADL: 22    Treatment & Education:  Pt.educated on edema management,dressing, shoe etiquette, and energy conservation upon d/c returning to home to maintain roles  Pt educated on role of occupational therapy, POC, and safety during ADLs and functional mobility. Pt and OT discussed importance of safe, continued mobility to optimize daily living skills. Pt verbalized understanding. Pt given instruction to call for medical staff/nurse for assistance.       Patient left HOB elevated with call button in reach and RN notified    GOALS:   Multidisciplinary Problems       Occupational Therapy Goals          Problem: Occupational Therapy    Goal Priority Disciplines Outcome Interventions   Occupational Therapy Goal     OT, PT/OT Ongoing, Progressing    Description: Goals to be met by: 6/13/23     Patient will increase functional independence with ADLs by performing:    UE Dressing with [place].  LE Dressing with [place].  Grooming while standing at sink with [place].  Toileting from toilet with [place] for hygiene and clothing management.   Toilet transfer to toilet with [place].                         Time Tracking:     OT Date of Treatment: 06/07/23  OT Start Time: 1315  OT Stop Time: 1325  OT Total Time (min): 10 min    Billable Minutes:Therapeutic Activity 10    OT/IVANIA: OT          6/7/2023

## 2023-06-08 VITALS
RESPIRATION RATE: 18 BRPM | SYSTOLIC BLOOD PRESSURE: 166 MMHG | WEIGHT: 226 LBS | BODY MASS INDEX: 30.61 KG/M2 | TEMPERATURE: 98 F | OXYGEN SATURATION: 90 % | HEIGHT: 72 IN | HEART RATE: 60 BPM | DIASTOLIC BLOOD PRESSURE: 74 MMHG

## 2023-06-08 LAB
ALBUMIN SERPL BCP-MCNC: 2.5 G/DL (ref 3.5–5.2)
ALP SERPL-CCNC: 59 U/L (ref 55–135)
ALT SERPL W/O P-5'-P-CCNC: 10 U/L (ref 10–44)
ANION GAP SERPL CALC-SCNC: 5 MMOL/L (ref 8–16)
AST SERPL-CCNC: 18 U/L (ref 10–40)
BASOPHILS NFR BLD: 0 % (ref 0–1.9)
BILIRUB SERPL-MCNC: 0.3 MG/DL (ref 0.1–1)
BUN SERPL-MCNC: 19 MG/DL (ref 8–23)
CALCIUM SERPL-MCNC: 8.5 MG/DL (ref 8.7–10.5)
CHLORIDE SERPL-SCNC: 103 MMOL/L (ref 95–110)
CO2 SERPL-SCNC: 31 MMOL/L (ref 23–29)
CREAT SERPL-MCNC: 0.7 MG/DL (ref 0.5–1.4)
DIFFERENTIAL METHOD: ABNORMAL
EOSINOPHIL NFR BLD: 3 % (ref 0–8)
ERYTHROCYTE [DISTWIDTH] IN BLOOD BY AUTOMATED COUNT: 19.1 % (ref 11.5–14.5)
EST. GFR  (NO RACE VARIABLE): >60 ML/MIN/1.73 M^2
FINAL PATHOLOGIC DIAGNOSIS: NORMAL
GLUCOSE SERPL-MCNC: 126 MG/DL (ref 70–110)
HCT VFR BLD AUTO: 32.3 % (ref 40–54)
HGB BLD-MCNC: 9.3 G/DL (ref 14–18)
IMM GRANULOCYTES # BLD AUTO: ABNORMAL K/UL (ref 0–0.04)
IMM GRANULOCYTES NFR BLD AUTO: ABNORMAL % (ref 0–0.5)
LYMPHOCYTES NFR BLD: 18 % (ref 18–48)
Lab: NORMAL
MAGNESIUM SERPL-MCNC: 1.5 MG/DL (ref 1.6–2.6)
MCH RBC QN AUTO: 20.8 PG (ref 27–31)
MCHC RBC AUTO-ENTMCNC: 28.8 G/DL (ref 32–36)
MCV RBC AUTO: 72 FL (ref 82–98)
MONOCYTES NFR BLD: 2 % (ref 4–15)
MYELOCYTES NFR BLD MANUAL: 1 %
NEUTROPHILS NFR BLD: 76 % (ref 38–73)
NRBC BLD-RTO: 0 /100 WBC
PHOSPHATE SERPL-MCNC: 2.6 MG/DL (ref 2.7–4.5)
PLATELET # BLD AUTO: 131 K/UL (ref 150–450)
PLATELET BLD QL SMEAR: ABNORMAL
PMV BLD AUTO: ABNORMAL FL (ref 9.2–12.9)
POCT GLUCOSE: 123 MG/DL (ref 70–110)
POCT GLUCOSE: 126 MG/DL (ref 70–110)
POTASSIUM SERPL-SCNC: 3.8 MMOL/L (ref 3.5–5.1)
PROT SERPL-MCNC: 5.5 G/DL (ref 6–8.4)
RBC # BLD AUTO: 4.47 M/UL (ref 4.6–6.2)
SODIUM SERPL-SCNC: 139 MMOL/L (ref 136–145)
TACROLIMUS BLD-MCNC: 5.2 NG/ML (ref 5–15)
WBC # BLD AUTO: 6.49 K/UL (ref 3.9–12.7)

## 2023-06-08 PROCEDURE — 36415 COLL VENOUS BLD VENIPUNCTURE: CPT | Performed by: STUDENT IN AN ORGANIZED HEALTH CARE EDUCATION/TRAINING PROGRAM

## 2023-06-08 PROCEDURE — 80197 ASSAY OF TACROLIMUS: CPT | Performed by: STUDENT IN AN ORGANIZED HEALTH CARE EDUCATION/TRAINING PROGRAM

## 2023-06-08 PROCEDURE — 63600175 PHARM REV CODE 636 W HCPCS: Performed by: STUDENT IN AN ORGANIZED HEALTH CARE EDUCATION/TRAINING PROGRAM

## 2023-06-08 PROCEDURE — 25000003 PHARM REV CODE 250: Performed by: STUDENT IN AN ORGANIZED HEALTH CARE EDUCATION/TRAINING PROGRAM

## 2023-06-08 PROCEDURE — 25000003 PHARM REV CODE 250: Performed by: SURGERY

## 2023-06-08 PROCEDURE — 85007 BL SMEAR W/DIFF WBC COUNT: CPT | Performed by: SURGERY

## 2023-06-08 PROCEDURE — 25000003 PHARM REV CODE 250

## 2023-06-08 PROCEDURE — 83735 ASSAY OF MAGNESIUM: CPT | Performed by: STUDENT IN AN ORGANIZED HEALTH CARE EDUCATION/TRAINING PROGRAM

## 2023-06-08 PROCEDURE — 85027 COMPLETE CBC AUTOMATED: CPT | Performed by: SURGERY

## 2023-06-08 PROCEDURE — 36415 COLL VENOUS BLD VENIPUNCTURE: CPT | Performed by: SURGERY

## 2023-06-08 PROCEDURE — 63600175 PHARM REV CODE 636 W HCPCS

## 2023-06-08 PROCEDURE — 80053 COMPREHEN METABOLIC PANEL: CPT | Performed by: STUDENT IN AN ORGANIZED HEALTH CARE EDUCATION/TRAINING PROGRAM

## 2023-06-08 PROCEDURE — 84100 ASSAY OF PHOSPHORUS: CPT | Performed by: STUDENT IN AN ORGANIZED HEALTH CARE EDUCATION/TRAINING PROGRAM

## 2023-06-08 RX ORDER — SODIUM,POTASSIUM PHOSPHATES 280-250MG
1 POWDER IN PACKET (EA) ORAL
Status: DISCONTINUED | OUTPATIENT
Start: 2023-06-08 | End: 2023-06-08 | Stop reason: HOSPADM

## 2023-06-08 RX ORDER — LANOLIN ALCOHOL/MO/W.PET/CERES
800 CREAM (GRAM) TOPICAL ONCE
Status: COMPLETED | OUTPATIENT
Start: 2023-06-08 | End: 2023-06-08

## 2023-06-08 RX ORDER — AMOXICILLIN AND CLAVULANATE POTASSIUM 875; 125 MG/1; MG/1
1 TABLET, FILM COATED ORAL 2 TIMES DAILY
Qty: 12 TABLET | Refills: 0 | Status: SHIPPED | OUTPATIENT
Start: 2023-06-08 | End: 2023-06-14

## 2023-06-08 RX ORDER — OXYCODONE HYDROCHLORIDE 5 MG/1
5 TABLET ORAL EVERY 4 HOURS PRN
Qty: 12 TABLET | Refills: 0 | Status: SHIPPED | OUTPATIENT
Start: 2023-06-08 | End: 2023-07-12

## 2023-06-08 RX ORDER — TALC
500 POWDER (GRAM) TOPICAL ONCE
Status: DISCONTINUED | OUTPATIENT
Start: 2023-06-08 | End: 2023-06-08

## 2023-06-08 RX ADMIN — POTASSIUM & SODIUM PHOSPHATES POWDER PACK 280-160-250 MG 1 PACKET: 280-160-250 PACK at 11:06

## 2023-06-08 RX ADMIN — ACETAMINOPHEN 1000 MG: 500 TABLET ORAL at 05:06

## 2023-06-08 RX ADMIN — AMIODARONE HYDROCHLORIDE 200 MG: 200 TABLET ORAL at 08:06

## 2023-06-08 RX ADMIN — Medication 800 MG: at 08:06

## 2023-06-08 RX ADMIN — SERTRALINE HYDROCHLORIDE 25 MG: 25 TABLET ORAL at 08:06

## 2023-06-08 RX ADMIN — AMOXICILLIN AND CLAVULANATE POTASSIUM 1 TABLET: 875; 125 TABLET, FILM COATED ORAL at 08:06

## 2023-06-08 RX ADMIN — TACROLIMUS 0.5 MG: 0.5 CAPSULE ORAL at 08:06

## 2023-06-08 RX ADMIN — OXYCODONE HYDROCHLORIDE 10 MG: 10 TABLET ORAL at 11:06

## 2023-06-08 RX ADMIN — ASPIRIN 81 MG: 81 TABLET, COATED ORAL at 08:06

## 2023-06-08 RX ADMIN — APIXABAN 5 MG: 5 TABLET, FILM COATED ORAL at 08:06

## 2023-06-08 RX ADMIN — AMLODIPINE BESYLATE 10 MG: 5 TABLET ORAL at 08:06

## 2023-06-08 RX ADMIN — HYDROMORPHONE HYDROCHLORIDE 0.5 MG: 1 INJECTION, SOLUTION INTRAMUSCULAR; INTRAVENOUS; SUBCUTANEOUS at 05:06

## 2023-06-08 RX ADMIN — GABAPENTIN 300 MG: 300 CAPSULE ORAL at 08:06

## 2023-06-08 NOTE — TREATMENT PLAN
Hepatology Treatment Plan    Tej Purdy is a 67 y.o. male admitted to hospital 6/1/2023 (Hospital Day: 8) due to Acute appendicitis.     Interval History  NAEON    Objective  Temp:  [96 °F (35.6 °C)-98.5 °F (36.9 °C)] 97.7 °F (36.5 °C) (06/08 0713)  Pulse:  [60-71] 60 (06/08 0713)  BP: (151-176)/(56-74) 166/74 (06/08 0713)  Resp:  [17-18] 18 (06/08 0713)  SpO2:  [90 %-94 %] 90 % (06/08 0713)    Laboratory    Lab Results   Component Value Date    WBC 6.49 06/08/2023    HGB 9.3 (L) 06/08/2023    HCT 32.3 (L) 06/08/2023    MCV 72 (L) 06/08/2023     (L) 06/08/2023       Lab Results   Component Value Date     06/08/2023    K 3.8 06/08/2023     06/08/2023    CO2 31 (H) 06/08/2023    BUN 19 06/08/2023    CREATININE 0.7 06/08/2023    CALCIUM 8.5 (L) 06/08/2023       Lab Results   Component Value Date    ALBUMIN 2.5 (L) 06/08/2023    ALT 10 06/08/2023    AST 18 06/08/2023    GGT 48 02/25/2021    ALKPHOS 59 06/08/2023    BILITOT 0.3 06/08/2023       Lab Results   Component Value Date    INR 1.3 (H) 06/01/2023    INR 1.2 02/01/2022    INR 1.2 01/13/2022       MELD-Na: 14 at 6/3/2023  4:18 AM  MELD: 13 at 6/3/2023  4:18 AM  Calculated from:  Serum Creatinine: 1.4 mg/dL at 6/3/2023  4:18 AM  Serum Sodium: 136 mmol/L at 6/3/2023  4:18 AM  Total Bilirubin: 0.5 mg/dL (Using min of 1 mg/dL) at 6/3/2023  4:18 AM  INR(ratio): 1.3 at 6/1/2023 11:09 PM      Assessment  This is a 67 year old male with PMH significant for cholangiocarcinoma (status post liver transplant in 01/2022), aortic stenosis, atrial fibrillation (on Apixaban), and peripheral artery disease (on ASA) who was admitted to [facility] on 06/01 for management of acute appendicitis. He is status post appendectomy on 06/02 showing perforated appendix with intra-abdominal abscess; abdominal drain placed.Hepatology consulted for management of immunosuppression. LFTs normal.      Recommendations:     -Tacrolimus 0.5 mg BID.   -CMP, INR, and Tacrolimus  level daily.     Thank you for involving us in the care of Tej STUART Hungdlefina. Please call with any additional concerns or questions.        Patrick Palomo MD, PGY-V  Gastroenterology Fellow  [facility]

## 2023-06-08 NOTE — DISCHARGE SUMMARY
[facility] - Surgery  General Surgery  Discharge Summary      Patient Name: Tej Purdy  MRN: [id number]  Admission Date: 6/1/2023  Hospital Length of Stay: 6 days  Discharge Date and Time:  06/08/2023 7:37 AM  Attending Physician: Shan Ross MD   Discharging Provider: Keyshawn Summers MD  Primary Care Provider: Thais Maxwell MD    HPI:   67 y.o. male comes in w/ RLQ pain x 48 hours w/ associated nausea and emesis.  Says he is still having bowel function, denies any diarrhea.  He has never had similar episodes to this before.  Last took Eliquis on 6/1     Allergies: NKDA  PMHx: DM2, pA fib on Eliquis, hx of cholangiocarcinoma (reason for txp)  PSHx: Liver Transplant, Jan 2022        Procedure(s) (LRB):  APPENDECTOMY, LAPAROSCOPIC (N/A)      Indwelling Lines/Drains at time of discharge:   Lines/Drains/Airways     None               Hospital Course: Pt admitted for acute appendcitis. Did well initially post-op but his course was delayed by issues with ileus. Now on POD#6 he is stable and ready for discharge. He is tolerating a diet, having bowel function, pain is controled, no fever or increase in WBC and is ambulating without issue. His drain was removed and benign a couple of days prior to discharge.  He will follow-up in clinic in 2 week and finish course of PO antibiotics.     General: No acute distress  HEENT: atraumatic  Resp: No resp distress, effort normal, normal chest movements   Cardiac: Normal rate  Abdomen: Soft, aTTP, incisions clear.Prior  Drain site with dressing.   Skin: warm and dry   Neuro: AOX4, at baseline  Psych: Behavior normal     Goals of Care Treatment Preferences:  Code Status: Full Code      Consults:   Consults (From admission, onward)        Status Ordering Provider     Inpatient consult to Hepatology  Once        Provider:  (Not yet assigned)    Completed NABOR LOZADA     Inpatient consult to General surgery  Once        Provider:  (Not yet assigned)    Completed JUDD  NICHOL OSBORNE          Significant Diagnostic Studies: N/A  See EMR    Pending Diagnostic Studies:     Procedure Component Value Units Date/Time    Specimen to Pathology, Surgery General Surgery [[id number]] Collected: 06/02/23 1248    Order Status: Sent Lab Status: In process Updated: 06/02/23 1744    Specimen: Tissue     Tacrolimus level [[id number]] Collected: 06/08/23 0459    Order Status: Sent Lab Status: In process Updated: 06/08/23 0519    Specimen: Blood         Final Active Diagnoses:    Diagnosis Date Noted POA    PRINCIPAL PROBLEM:  Acute appendicitis [K35.80] 06/02/2023 Yes    S/P liver transplant [Z94.4] 01/06/2022 Not Applicable      Problems Resolved During this Admission:      Discharged Condition: stable    Disposition: Home or Self Care    Follow Up:   Follow-up Information     [facility] Follow up.    Specialty: Home Health Services  Contact information:  [address]  [address]  [place] [id number] [phone]             Shan Ross MD Follow up in 2 week(s).    Specialties: General Surgery, Surgery  Why: After surgery follow-up  Contact information:  [address] [facility] [id number] [phone]                       Patient Instructions:      Diet Adult Regular     Lifting restrictions   Order Comments: No lifting greater than 10 pounds for 6 weeks from day of surgery.  No pushing/pulling such as vacuuming or raking.  No straining, avoid constipation and take stool softeners as described and laxatives as needed.  No driving while on narcotics and until you can react quickly without pain.     No dressing needed   Order Comments: WOUND CARE  You have skin glue over your incision(s)  This will slowly flake away in about 10 days  It is okay to shower starting the day after surgery  Can pat your incision dry  Please do not scrub hard over your incisions  Please do not pick the glue off or it will reopen the wound     Notify your health care provider if you experience  "any of the following:  temperature >100.4     Notify your health care provider if you experience any of the following:  persistent nausea and vomiting or diarrhea     Notify your health care provider if you experience any of the following:  redness, tenderness, or signs of infection (pain, swelling, redness, odor or green/yellow discharge around incision site)     Notify your health care provider if you experience any of the following:  difficulty breathing or increased cough     Medications:  Reconciled Home Medications:      Medication List      START taking these medications    amoxicillin-clavulanate 875-125mg 875-125 mg per tablet  Commonly known as: AUGMENTIN  Take 1 tablet by mouth 2 (two) times daily. for 6 days     oxyCODONE 5 MG immediate release tablet  Commonly known as: ROXICODONE  Take 1 tablet (5 mg total) by mouth every 4 (four) hours as needed for Pain.        CONTINUE taking these medications    amiodarone 200 MG Tab  Commonly known as: PACERONE  Take 1 tablet (200 mg total) by mouth once daily.     amLODIPine 10 MG tablet  Commonly known as: NORVASC  Take 10 mg by mouth once daily.     aspirin 81 MG EC tablet  Commonly known as: ECOTRIN  Take 81 mg by mouth once daily.     BD ULTRA-FINE GÉNESIS PEN NEEDLE 32 gauge x 5/32" Ndle  Generic drug: pen needle, diabetic  Use to inject insulin 4 times daily     blood sugar diagnostic Strp  To check BG 2 times daily, to use with insurance preferred meter (patient has a [employer] self-monitoring glucose meter)     blood-glucose meter kit  To check BG 2 times daily, to use with insurance preferred meter     RADHA-GEST ANTACID 200 mg calcium (500 mg) chewable tablet  Generic drug: calcium carbonate  Take 1 tablet (500 mg total) by mouth 3 (three) times daily as needed for Heartburn.     calcium carbonate-vitamin D3 600 mg-20 mcg (800 unit) Tab  Take 1 tablet by mouth once daily.     ELIQUIS 5 mg Tab  Generic drug: apixaban  Take 1 tablet (5 mg total) by mouth 2 " (two) times daily.     fish oil-omega-3 fatty acids 300-1,000 mg capsule  Take 1 capsule by mouth once daily.     furosemide 20 MG tablet  Commonly known as: LASIX  Take 1 tablet (20 mg total) by mouth once daily.     gabapentin 300 MG capsule  Commonly known as: NEURONTIN  Take 1 capsule (300 mg total) by mouth 3 (three) times daily as needed (neuropathy).     HumaLOG KwikPen Insulin 100 unit/mL pen  Generic drug: insulin lispro  Inject 6 Units into the skin 3 (three) times daily with meals. Plus sliding scale, MDD: 48 units     lancets Misc  To check BG 2 times daily, to use with insurance preferred meter     LANTUS SOLOSTAR U-100 INSULIN glargine 100 units/mL SubQ pen  Generic drug: insulin  INJECT 22 UNITS INTO THE SKIN EVERY EVENING. ADJUST DOSE UNTIL AM GLUCOSE GOAL . MAX DOSE 30 UNITS/DY     lovastatin 20 MG tablet  Commonly known as: MEVACOR  Take 20 mg by mouth once daily.     magnesium oxide 400 mg (241.3 mg magnesium) tablet  Commonly known as: MAG-OX  Take 2 tablets (800 mg total) by mouth 3 (three) times daily.     multivitamin capsule  Take 1 capsule by mouth once daily.     ondansetron 4 MG Tbdl  Commonly known as: ZOFRAN-ODT  Dissolve 1 tablet (4 mg total) by mouth every 8 (eight) hours as needed (nausea).     pantoprazole 40 MG tablet  Commonly known as: PROTONIX  Take 1 tablet (40 mg total) by mouth once daily.     sertraline 25 MG tablet  Commonly known as: ZOLOFT  Take 1 tablet (25 mg total) by mouth once daily.     tacrolimus 0.5 MG Cap  Commonly known as: PROGRAF  Take 1 capsule (0.5 mg total) by mouth every 12 (twelve) hours.     zolpidem 5 MG Tab  Commonly known as: AMBIEN  Take 1 tablet (5 mg total) by mouth nightly as needed (insomnia).        ASK your doctor about these medications    pulse oximeter device  Commonly known as: pulse oximeter  by Apply Externally route 2 (two) times a day. Use twice daily at 8 AM and 3 PM and record the value in [employer] as directed.          Time spent  on the discharge of patient: 10 minutes    Keyshawn Summers MD  General Surgery  [facility] - Surgery

## 2023-06-08 NOTE — PLAN OF CARE
CHW met with patient/family at bedside. Patient experience rounding completed and reviewed the following.     Do you know your discharge plan? Yes        If yes, what is the plan? Home    If you are discharging home, do you have help at home? Yes    Do you think you will need help at home at discharge? No     Have you discussed your needs and preferences with your SW/CM? Yes     Assigned SW/CM notified of any patient/family needs or concerns.

## 2023-06-08 NOTE — HOSPITAL COURSE
Pt admitted for acute appendcitis. Did well initially post-op but his course was delayed by issues with ileus. Now on POD#6 he is stable and ready for discharge. He is tolerating a diet, having bowel function, pain is controled, no fever or increase in WBC and is ambulating without issue. He will follow-up in clinic in 2 week and finish course of PO antibiotics.

## 2023-06-08 NOTE — PHYSICIAN QUERY
PT Name: Tej Purdy  MR #: [id number]    DOCUMENTATION CLARIFICATION     CDS/: Kerry Nye               Contact information: [email or web address]  This form is a permanent document in the medical record.     Query Date: June 8, 2023    By submitting this query, we are merely seeking further clarification of documentation. Please utilize your independent clinical judgment when addressing the question(s) below.       The Medical Record contains the following:   Indicators   Supporting Clinical Findings Location in Medical Record   x Postoperative Ileus documented Pt admitted for acute appendcitis. Did well initially post-op but his course was delayed by issues with ileus. Now on POD#6 he is stable and ready for discharge.   Discharge Summary 06/08   x Subjective/Objective GI assessment No nausea or vomiting. Some flatus, but not much. Lots of reflux.     Did well with clears. No nausea or vomiting. Having bowel function.    General Surgery PN 06/05    General Surgery PN 06/06   x Radiology Reports Stomach and distal esophagus are normal caliber.  The distal duodenum and proximal jejunum are decompressed.  There is caliber change of the small bowel about the proximal jejunum (axial series 2, image 57).  Distension of the small bowel loops measuring up to 4.0 cm.  Suspected distal transition point is favored to be in the distal ileum adjacent to the surgical track (axial series 2, image 145). Postoperative change of appendectomy.  The majority of the colon is distended with air.       Moderate gaseous distension of small bowel throughout the abdomen suggestive of ileus in this patient with recent surgery, though suggest continued follow-up if there is strong clinical concern for bowel obstruction.   CT Abdomen Pelvis 06/05                XR Abdomen AP 1 View 06/06   x Surgical Procedure History of liver transplant with perforated appendicitis s/p laparoscopic appendectomy and drainage of  intra-abdominal abscess   General Surgery PN 06/05       x Treatment/Medication Back down to clears  Keep drain  Continue IV abx  PRN pain and nausea meds  Added dilaudid today  Okay for tums    Regular diet  D/c drain  D/c IV abx, switch to PO augmentin   Possible home later today    Vomited X 1 today when DANNIELLE drain was removed. MD ordered NG tube, but pt refused. NPO for now.     CLD  Dispo: Awaiting ROBF   General Surgery PN 06/05              General Surgery PN 06/06          Nurse's Plan of Care 06/06      General Surgery PN 06/07      Other           Please further specify the postoperative ileus:    [   ] Paralytic/Adynamic ileus, complication of surgery    [  x ] Paralytic/Adynamic ileus -- occurring in the post-operative period but not a complication of the surgery    [   ] Other clarification (please specify): _____________   [  ] Clinically Undetermined       Please document in your progress notes daily for the duration of treatment, until resolved, and include in your discharge summary.       Form No. [id number]

## 2023-06-12 DIAGNOSIS — Z94.4 S/P LIVER TRANSPLANT: ICD-10-CM

## 2023-06-12 DIAGNOSIS — R18.8 OTHER ASCITES: Primary | ICD-10-CM

## 2023-06-12 PROCEDURE — G0180 MD CERTIFICATION HHA PATIENT: HCPCS | Mod: ,,, | Performed by: SURGERY

## 2023-06-12 PROCEDURE — G0180 PR HOME HEALTH MD CERTIFICATION: ICD-10-PCS | Mod: ,,, | Performed by: SURGERY

## 2023-06-14 NOTE — PLAN OF CARE
[facility] - [facility]  Discharge Final Note    Primary Care Provider: Thais Maxwell MD    Expected Discharge Date: 6/8/2023    Final Discharge Note (most recent)       Final Note - 06/08/23 1259          Final Note    Assessment Type Final Discharge Note     Anticipated Discharge Disposition Home or Self Care     Hospital Resources/Appts/Education Provided Provided patient/caregiver with written discharge plan information;Appointments scheduled by Navigator/Coordinator                   Future Appointments   Date Time Provider Department Center   6/19/2023  8:00 AM [facility]   6/22/2023  2:00 PM Shan Ross MD [facility]     Important Message from Medicare  Important Message from Medicare regarding Discharge Appeal Rights: Given to patient/caregiver, Explained to patient/caregiver, Signed/date by patient/caregiver     Date IMM was signed: 06/08/23  Time IMM was signed: 1220    Contact Info       [facility]   Specialty: Home Health Services    [address]  [address]  [place] [address]   Phone: [phone]       Next Steps: Follow up    Shan Ross MD   Specialty: General Surgery, Surgery    [address]  [facility] [address]   Phone: [phone]       Next Steps: Follow up in 2 week(s)    Instructions: After surgery follow-up

## 2023-06-16 RX ORDER — FUROSEMIDE 20 MG/1
20 TABLET ORAL DAILY
Qty: 30 TABLET | Refills: 2 | Status: SHIPPED | OUTPATIENT
Start: 2023-06-16 | End: 2023-09-20 | Stop reason: SDUPTHER

## 2023-06-19 ENCOUNTER — LAB VISIT (OUTPATIENT)
Dept: LAB | Facility: HOSPITAL | Age: 68
End: 2023-06-19
Attending: INTERNAL MEDICINE
Payer: MEDICARE

## 2023-06-19 DIAGNOSIS — Z94.4 S/P LIVER TRANSPLANT: ICD-10-CM

## 2023-06-19 LAB
ALBUMIN SERPL BCP-MCNC: 3.4 G/DL (ref 3.5–5.2)
ALP SERPL-CCNC: 67 U/L (ref 55–135)
ALT SERPL W/O P-5'-P-CCNC: 16 U/L (ref 10–44)
ANION GAP SERPL CALC-SCNC: 13 MMOL/L (ref 8–16)
ANISOCYTOSIS BLD QL SMEAR: SLIGHT
AST SERPL-CCNC: 27 U/L (ref 10–40)
BASOPHILS # BLD AUTO: 0.06 K/UL (ref 0–0.2)
BASOPHILS NFR BLD: 0.9 % (ref 0–1.9)
BILIRUB SERPL-MCNC: 0.7 MG/DL (ref 0.1–1)
BUN SERPL-MCNC: 14 MG/DL (ref 8–23)
CALCIUM SERPL-MCNC: 9.5 MG/DL (ref 8.7–10.5)
CHLORIDE SERPL-SCNC: 99 MMOL/L (ref 95–110)
CO2 SERPL-SCNC: 26 MMOL/L (ref 23–29)
CREAT SERPL-MCNC: 0.9 MG/DL (ref 0.5–1.4)
DACRYOCYTES BLD QL SMEAR: ABNORMAL
DIFFERENTIAL METHOD: ABNORMAL
EOSINOPHIL # BLD AUTO: 0.1 K/UL (ref 0–0.5)
EOSINOPHIL NFR BLD: 1.6 % (ref 0–8)
ERYTHROCYTE [DISTWIDTH] IN BLOOD BY AUTOMATED COUNT: 20.7 % (ref 11.5–14.5)
EST. GFR  (NO RACE VARIABLE): >60 ML/MIN/1.73 M^2
GLUCOSE SERPL-MCNC: 132 MG/DL (ref 70–110)
HCT VFR BLD AUTO: 40.2 % (ref 40–54)
HGB BLD-MCNC: 11.4 G/DL (ref 14–18)
HYPOCHROMIA BLD QL SMEAR: ABNORMAL
IMM GRANULOCYTES # BLD AUTO: 0.02 K/UL (ref 0–0.04)
IMM GRANULOCYTES NFR BLD AUTO: 0.3 % (ref 0–0.5)
LYMPHOCYTES # BLD AUTO: 1.5 K/UL (ref 1–4.8)
LYMPHOCYTES NFR BLD: 21.9 % (ref 18–48)
MCH RBC QN AUTO: 21.2 PG (ref 27–31)
MCHC RBC AUTO-ENTMCNC: 28.4 G/DL (ref 32–36)
MCV RBC AUTO: 75 FL (ref 82–98)
MONOCYTES # BLD AUTO: 0.9 K/UL (ref 0.3–1)
MONOCYTES NFR BLD: 13.6 % (ref 4–15)
NEUTROPHILS # BLD AUTO: 4.2 K/UL (ref 1.8–7.7)
NEUTROPHILS NFR BLD: 61.7 % (ref 38–73)
NRBC BLD-RTO: 0 /100 WBC
OVALOCYTES BLD QL SMEAR: ABNORMAL
PLATELET # BLD AUTO: ABNORMAL K/UL (ref 150–450)
PLATELET BLD QL SMEAR: ABNORMAL
PMV BLD AUTO: ABNORMAL FL (ref 9.2–12.9)
POIKILOCYTOSIS BLD QL SMEAR: SLIGHT
POTASSIUM SERPL-SCNC: 4.3 MMOL/L (ref 3.5–5.1)
PROT SERPL-MCNC: 7.2 G/DL (ref 6–8.4)
RBC # BLD AUTO: 5.38 M/UL (ref 4.6–6.2)
SODIUM SERPL-SCNC: 138 MMOL/L (ref 136–145)
WBC # BLD AUTO: 6.85 K/UL (ref 3.9–12.7)

## 2023-06-19 PROCEDURE — 85025 COMPLETE CBC W/AUTO DIFF WBC: CPT | Performed by: INTERNAL MEDICINE

## 2023-06-19 PROCEDURE — 36415 COLL VENOUS BLD VENIPUNCTURE: CPT | Mod: PN | Performed by: INTERNAL MEDICINE

## 2023-06-19 PROCEDURE — 80053 COMPREHEN METABOLIC PANEL: CPT | Performed by: INTERNAL MEDICINE

## 2023-06-19 PROCEDURE — 80197 ASSAY OF TACROLIMUS: CPT | Performed by: INTERNAL MEDICINE

## 2023-06-20 LAB — TACROLIMUS BLD-MCNC: 4.8 NG/ML (ref 5–15)

## 2023-06-22 ENCOUNTER — PATIENT MESSAGE (OUTPATIENT)
Dept: TRANSPLANT | Facility: CLINIC | Age: 68
End: 2023-06-22
Payer: MEDICARE

## 2023-06-22 ENCOUNTER — TELEPHONE (OUTPATIENT)
Dept: TRANSPLANT | Facility: CLINIC | Age: 68
End: 2023-06-22
Payer: MEDICARE

## 2023-06-22 NOTE — TELEPHONE ENCOUNTER
Pt advised. Repeat labs requested 7/3/23.  ----- Message from Radha Avilez MD sent at 6/21/2023  6:52 PM CDT -----  Labs okay but tacrolimus level is running low.  Repeat labs in 2 weeks.

## 2023-06-29 ENCOUNTER — PATIENT MESSAGE (OUTPATIENT)
Dept: SURGERY | Facility: CLINIC | Age: 68
End: 2023-06-29
Payer: MEDICARE

## 2023-07-03 ENCOUNTER — PATIENT MESSAGE (OUTPATIENT)
Dept: TRANSPLANT | Facility: CLINIC | Age: 68
End: 2023-07-03
Payer: MEDICARE

## 2023-07-05 ENCOUNTER — LAB VISIT (OUTPATIENT)
Dept: LAB | Facility: HOSPITAL | Age: 68
End: 2023-07-05
Attending: INTERNAL MEDICINE
Payer: MEDICARE

## 2023-07-05 DIAGNOSIS — Z94.4 S/P LIVER TRANSPLANT: ICD-10-CM

## 2023-07-05 LAB
ALBUMIN SERPL BCP-MCNC: 3.8 G/DL (ref 3.5–5.2)
ALP SERPL-CCNC: 74 U/L (ref 55–135)
ALT SERPL W/O P-5'-P-CCNC: 20 U/L (ref 10–44)
ANION GAP SERPL CALC-SCNC: 12 MMOL/L (ref 8–16)
AST SERPL-CCNC: 28 U/L (ref 10–40)
BASOPHILS # BLD AUTO: 0.1 K/UL (ref 0–0.2)
BASOPHILS NFR BLD: 0.7 % (ref 0–1.9)
BILIRUB SERPL-MCNC: 0.9 MG/DL (ref 0.1–1)
BUN SERPL-MCNC: 11 MG/DL (ref 8–23)
CALCIUM SERPL-MCNC: 9.7 MG/DL (ref 8.7–10.5)
CHLORIDE SERPL-SCNC: 99 MMOL/L (ref 95–110)
CO2 SERPL-SCNC: 26 MMOL/L (ref 23–29)
CREAT SERPL-MCNC: 1 MG/DL (ref 0.5–1.4)
DIFFERENTIAL METHOD: ABNORMAL
EOSINOPHIL # BLD AUTO: 0.2 K/UL (ref 0–0.5)
EOSINOPHIL NFR BLD: 1.2 % (ref 0–8)
ERYTHROCYTE [DISTWIDTH] IN BLOOD BY AUTOMATED COUNT: 20.5 % (ref 11.5–14.5)
EST. GFR  (NO RACE VARIABLE): >60 ML/MIN/1.73 M^2
GLUCOSE SERPL-MCNC: 137 MG/DL (ref 70–110)
HCT VFR BLD AUTO: 41 % (ref 40–54)
HGB BLD-MCNC: 11.9 G/DL (ref 14–18)
IMM GRANULOCYTES # BLD AUTO: 0.1 K/UL (ref 0–0.04)
IMM GRANULOCYTES NFR BLD AUTO: 0.7 % (ref 0–0.5)
LYMPHOCYTES # BLD AUTO: 2.4 K/UL (ref 1–4.8)
LYMPHOCYTES NFR BLD: 17.8 % (ref 18–48)
MCH RBC QN AUTO: 21.1 PG (ref 27–31)
MCHC RBC AUTO-ENTMCNC: 29 G/DL (ref 32–36)
MCV RBC AUTO: 73 FL (ref 82–98)
MONOCYTES # BLD AUTO: 1.5 K/UL (ref 0.3–1)
MONOCYTES NFR BLD: 11.1 % (ref 4–15)
NEUTROPHILS # BLD AUTO: 9.2 K/UL (ref 1.8–7.7)
NEUTROPHILS NFR BLD: 68.5 % (ref 38–73)
NRBC BLD-RTO: 0 /100 WBC
PLATELET # BLD AUTO: 217 K/UL (ref 150–450)
PLATELET BLD QL SMEAR: ABNORMAL
PMV BLD AUTO: ABNORMAL FL (ref 9.2–12.9)
POTASSIUM SERPL-SCNC: 4 MMOL/L (ref 3.5–5.1)
PROT SERPL-MCNC: 8.1 G/DL (ref 6–8.4)
RBC # BLD AUTO: 5.65 M/UL (ref 4.6–6.2)
SODIUM SERPL-SCNC: 137 MMOL/L (ref 136–145)
WBC # BLD AUTO: 13.44 K/UL (ref 3.9–12.7)
WBC TOXIC VACUOLES BLD QL SMEAR: PRESENT

## 2023-07-05 PROCEDURE — 85025 COMPLETE CBC W/AUTO DIFF WBC: CPT | Performed by: INTERNAL MEDICINE

## 2023-07-05 PROCEDURE — 36415 COLL VENOUS BLD VENIPUNCTURE: CPT | Mod: PN | Performed by: INTERNAL MEDICINE

## 2023-07-05 PROCEDURE — 80197 ASSAY OF TACROLIMUS: CPT | Performed by: INTERNAL MEDICINE

## 2023-07-05 PROCEDURE — 80053 COMPREHEN METABOLIC PANEL: CPT | Performed by: INTERNAL MEDICINE

## 2023-07-06 ENCOUNTER — PATIENT MESSAGE (OUTPATIENT)
Dept: TRANSPLANT | Facility: CLINIC | Age: 68
End: 2023-07-06
Payer: MEDICARE

## 2023-07-06 LAB — TACROLIMUS BLD-MCNC: 4.4 NG/ML (ref 5–15)

## 2023-07-10 ENCOUNTER — PATIENT MESSAGE (OUTPATIENT)
Dept: TRANSPLANT | Facility: CLINIC | Age: 68
End: 2023-07-10
Payer: MEDICARE

## 2023-07-10 ENCOUNTER — PATIENT MESSAGE (OUTPATIENT)
Dept: ENDOSCOPY | Facility: HOSPITAL | Age: 68
End: 2023-07-10
Payer: MEDICARE

## 2023-07-10 ENCOUNTER — TELEPHONE (OUTPATIENT)
Dept: TRANSPLANT | Facility: CLINIC | Age: 68
End: 2023-07-10
Payer: MEDICARE

## 2023-07-10 ENCOUNTER — TELEPHONE (OUTPATIENT)
Dept: ENDOSCOPY | Facility: HOSPITAL | Age: 68
End: 2023-07-10
Payer: MEDICARE

## 2023-07-10 DIAGNOSIS — Z94.4 S/P LIVER TRANSPLANT: Primary | ICD-10-CM

## 2023-07-10 NOTE — TELEPHONE ENCOUNTER
Sent patient message via portal to let him know labs are stable.  No medication changes, next labs due on 7/24/23      ----- Message from Thais Maxwell MD sent at 7/10/2023 12:29 AM CDT -----  The Labs are stable - please let patient know.

## 2023-07-11 ENCOUNTER — PATIENT MESSAGE (OUTPATIENT)
Dept: TRANSPLANT | Facility: CLINIC | Age: 68
End: 2023-07-11
Payer: MEDICARE

## 2023-07-11 NOTE — PROGRESS NOTES
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 Diagnoses of Loss of appetite, Weight loss, and Nausea were also pertinent to this visit.  Referring Provider/PCP: Thais Maxwell MD    History of Present Illness:  Tej Purdy is a 67 y.o. male with a history of cholangiocarcinoma (status post liver transplant in 01/2022), aortic stenosis, atrial fibrillation (on Apixaban), and peripheral artery disease (on ASA) who is presenting for initial evaluation of nausea & anorexia.     The patient reports that since his transplant, his appetite has reduced considerably.  We will often go for several days on just a sandwich.  Endorses bouts of nausea and regurgitation of food, which can occur with some retching.  He reports that he regurgitates food soon after eating and will taste the food that he just ate.  Denies significant diarrhea, constipation, odynophagia, blood in stool, severe abdominal pain or acid reflux.  He has been compliant with p.o. PPI q.day.    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 He had a recent admission to the hospital for acute appendicitis. He is status post appendectomy on 06/02 showing perforated appendix with intra-abdominal abscess; abdominal drain placed.    PEndoHx:  EGD (7/1/21):                          - Esophageal mucosal changes suspicious for                          short-segment Aragon's esophagus, biopsied (Intestinal metaplasia, negative                           for dysplasia).                          - Small hiatal hernia.                          - No esophageal or gastric varices.    Colonoscopy in 2020 showed no polyps (report not available).      Review of Systems   Constitutional:  Positive for weight loss. Negative for chills and fever.   Respiratory:  Negative for cough.    Cardiovascular:  Negative for chest pain.   Gastrointestinal:  Positive for  nausea. Negative for blood in stool, constipation, diarrhea and vomiting.        Loss of appetite, anorexia   Skin:  Negative for itching and rash.   Psychiatric/Behavioral:  Negative for substance abuse.      Medical History:  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    Cirrhosis 11/23/2020    Diabetes mellitus, type 2     Diabetic neuropathy 11/24/2020    Disorder of kidney and ureter     HTN (hypertension) 11/23/2020    Hyperlipidemia 11/23/2020    Kidney stones 11/23/2020    S/p lithotripsy 2020    Leukocytosis 1/15/2021    Liver mass 11/23/2020    CASTILLO (nonalcoholic steatohepatitis) 11/23/2020    Other ascites 3/16/2022    PAD (peripheral artery disease)     Portal hypertension 11/23/2020    Skin cancer 11/23/2020       Past Surgical History:   Procedure Laterality Date    COLONOSCOPY  10/2020    ESOPHAGOGASTRODUODENOSCOPY  10/2020    ESOPHAGOGASTRODUODENOSCOPY N/A 7/1/2021    Procedure: EGD (ESOPHAGOGASTRODUODENOSCOPY);  Surgeon: Jovan David MD;  [facility]: [facility] ([facility]  Service: Endoscopy;  Laterality: N/A;  HCC. Listed for liver transplant. EGD for variceal surveillance.  cardiac clearance and blood thinenr approval received, see telephone encounter 6/23/21-BB  fully vaccinated-BB  labs same day of procedure-BB    EXCISION OF HYDROCELE Right     hemorroid surgery      hemorroidectomy      KIDNEY STONE SURGERY      LAPAROSCOPIC APPENDECTOMY N/A 6/2/2023    Procedure: APPENDECTOMY, LAPAROSCOPIC;  Surgeon: Shan Ross MD;  Location: [facility] OR [address] [place] [address];  Service: General;  Laterality: N/A;    LEFT HEART CATHETERIZATION N/A 1/27/2021    Procedure: Left heart cath;  Surgeon: Kris Shelton MD;  Location: [facility] CATH LAB;  Service: Cardiology;  Laterality: N/A;    liver mass removal      LIVER TRANSPLANT N/A 1/6/2022    Procedure: TRANSPLANT, LIVER;  Surgeon: Lizet Garcia MD;  Location: [facility] OR [address] [place] [address];  Service: Transplant;  Laterality:  N/A;    RIGHT HEART CATHETERIZATION Right 2021    Procedure: INSERTION, CATHETER, RIGHT HEART;  Surgeon: Jaden Schuster MD;  Location: [facility] CATH LAB;  Service: Cardiology;  Laterality: Right;    TREATMENT OF CARDIAC ARRHYTHMIA N/A 2021    Procedure: CARDIOVERSION;  Surgeon: Didier Tilley MD;  Location: [facility] EP LAB;  Service: Cardiology;  Laterality: N/A;  a fib, dccv/johny, mac, dm. sscu    TREATMENT OF CARDIAC ARRHYTHMIA N/A 2021    Procedure: CARDIOVERSION;  Surgeon: Daniel Arambula MD;  Location: [facility] EP LAB;  Service: Cardiology;  Laterality: N/A;  afib, DCCV, anest., DM, 3 prep    TREATMENT OF CARDIAC ARRHYTHMIA N/A 2021    Procedure: Cardioversion or Defibrillation;  Surgeon: Didier Tilley MD;  Location: [facility] EP LAB;  Service: Cardiology;  Laterality: N/A;  AF, JOHNY (cancel if complaint), DCCV, MAC, DM, 3 Prep    TREATMENT OF CARDIAC ARRHYTHMIA N/A 2021    Procedure: Cardioversion or Defibrillation;  Surgeon: Didier Tilley MD;  Location: [facility] EP LAB;  Service: Cardiology;  Laterality: N/A;  AF, JOHNY (cx if complaint), DCCV, MAC, DM, 3 Prep       Family History   Problem Relation Age of Onset    Coronary artery disease Father     Colon cancer Neg Hx     Esophageal cancer Neg Hx        Social History     Socioeconomic History    Marital status:    Tobacco Use    Smoking status: Former     Types: Cigarettes     Quit date: 1980     Years since quittin.4    Smokeless tobacco: Former   Substance and Sexual Activity    Alcohol use: Not Currently    Drug use: Never     Social Determinants of Health     Financial Resource Strain: Low Risk     Difficulty of Paying Living Expenses: Not very hard   Food Insecurity: No Food Insecurity    Worried About Running Out of Food in the Last Year: Never true    Ran Out of Food in the Last Year: Never true   Transportation Needs: No Transportation Needs    Lack of Transportation (Medical): No    Lack of Transportation (Non-Medical): No    Physical Activity: Insufficiently Active    Days of Exercise per Week: 1 day    Minutes of Exercise per Session: 30 min   Stress: Stress Concern Present    Feeling of Stress : To some extent   Social Connections: Moderately Isolated    Frequency of Communication with Friends and Family: More than three times a week    Frequency of Social Gatherings with Friends and Family: More than three times a week    Attends Temple Services: Never    Active Member of Clubs or Organizations: Yes    Attends Club or Organization Meetings: 1 to 4 times per year    Marital Status:    Housing Stability: Low Risk     Unable to Pay for Housing in the Last Year: No    Number of Places Lived in the Last Year: 1    Unstable Housing in the Last Year: No       Current Outpatient Medications on File Prior to Visit   Medication Sig Dispense Refill    amiodarone (PACERONE) 200 MG Tab Take 1 tablet (200 mg total) by mouth once daily. 30 tablet 11    amLODIPine (NORVASC) 10 MG tablet Take 10 mg by mouth once daily.      apixaban (ELIQUIS) 5 mg Tab Take 1 tablet (5 mg total) by mouth 2 (two) times daily. 60 tablet 3    aspirin (ECOTRIN) 81 MG EC tablet Take 81 mg by mouth once daily.      blood sugar diagnostic Strp To check BG 2 times daily, to use with insurance preferred meter (patient has a [employer] self-monitoring glucose meter) 100 strip 11    blood-glucose meter kit To check BG 2 times daily, to use with insurance preferred meter 1 each 0    calcium carbonate-vitamin D3 600 mg-20 mcg (800 unit) Tab Take 1 tablet by mouth once daily. 30 tablet 5    furosemide (LASIX) 20 MG tablet Take 1 tablet (20 mg total) by mouth once daily. 30 tablet 2    gabapentin (NEURONTIN) 300 MG capsule Take 1 capsule (300 mg total) by mouth 3 (three) times daily as needed (neuropathy). 90 capsule 5    insulin (LANTUS SOLOSTAR U-100 INSULIN) glargine 100 units/mL SubQ pen INJECT 22 UNITS INTO THE SKIN EVERY EVENING. ADJUST DOSE UNTIL AM GLUCOSE GOAL  ". MAX DOSE 30 UNITS/DY 15 mL 11    insulin lispro (HUMALOG KWIKPEN INSULIN) 100 unit/mL pen Inject 6 Units into the skin 3 (three) times daily with meals. Plus sliding scale, MDD: 48 units 15 mL 11    lancets Misc To check BG 2 times daily, to use with insurance preferred meter 100 each 11    lovastatin (MEVACOR) 20 MG tablet Take 20 mg by mouth once daily.      magnesium oxide (MAG-OX) 400 mg (241.3 mg magnesium) tablet Take 2 tablets (800 mg total) by mouth 3 (three) times daily. 180 tablet 6    multivitamin capsule Take 1 capsule by mouth once daily.      omega-3 fatty acids/fish oil (FISH OIL-OMEGA-3 FATTY ACIDS) 300-1,000 mg capsule Take 1 capsule by mouth once daily.       ondansetron (ZOFRAN-ODT) 4 MG TbDL Dissolve 1 tablet (4 mg total) by mouth every 8 (eight) hours as needed (nausea). 30 tablet 5    pantoprazole (PROTONIX) 40 MG tablet Take 1 tablet (40 mg total) by mouth once daily. 30 tablet 5    pen needle, diabetic (BD ULTRA-FINE GÉNESIS PEN NEEDLE) 32 gauge x 5/32" Ndle Use to inject insulin 4 times daily 200 each 11    pulse oximeter (PULSE OXIMETER) device by Apply Externally route 2 (two) times a day. Use twice daily at 8 AM and 3 PM and record the value in [facility] as directed. 1 each 0    sertraline (ZOLOFT) 25 MG tablet Take 1 tablet (25 mg total) by mouth once daily. 30 tablet 5    tacrolimus (PROGRAF) 0.5 MG Cap Take 1 capsule (0.5 mg total) by mouth every 12 (twelve) hours. 60 capsule 11    calcium carbonate (TUMS) 200 mg calcium (500 mg) chewable tablet Take 1 tablet (500 mg total) by mouth 3 (three) times daily as needed for Heartburn. 90 tablet 5    oxyCODONE (ROXICODONE) 5 MG immediate release tablet Take 1 tablet (5 mg total) by mouth every 4 (four) hours as needed for Pain. 12 tablet 0    zolpidem (AMBIEN) 5 MG Tab Take 1 tablet (5 mg total) by mouth nightly as needed (insomnia). 30 tablet 0     Current Facility-Administered Medications on File Prior to Visit   Medication Dose Route " Frequency Provider Last Rate Last Admin    sodium chloride 0.9% bolus 1,000 mL  1,000 mL Intravenous Once Solange Bill NP           Review of patient's allergies indicates:   Allergen Reactions    Bee pollens Swelling     BEE STINGS swells body       Physical Exam:  Constitutional: healthy,  alert,  not in acute distress, oriented to time, place, and person, and well developed  HENT: Head: Normal, normocephalic, atraumatic.  Poor dentition  Eyes: conjunctiva clear and sclera nonicteric  GI: soft, non-tender, without masses or organomegaly, nondistended, without guarding, without rebound, and no masses palpated  Musculoskeletal: no muscular tenderness noted  Skin: normal color and no jaundice  Neurological: alert, oriented x3  speech normal in context and clarity  memory intact grossly      Laboratory:  Lab Results   Component Value Date     07/05/2023    K 4.0 07/05/2023    CL 99 07/05/2023    CO2 26 07/05/2023    BUN 11 07/05/2023    CREATININE 1.0 07/05/2023    CALCIUM 9.7 07/05/2023    ANIONGAP 12 07/05/2023    ESTGFRAFRICA >60.0 07/18/2022    EGFRNONAA >60.0 07/18/2022       Lab Results   Component Value Date    ALT 20 07/05/2023    AST 28 07/05/2023    GGT 48 02/25/2021    ALKPHOS 74 07/05/2023    BILITOT 0.9 07/05/2023       Lab Results   Component Value Date    WBC 13.44 (H) 07/05/2023    HGB 11.9 (L) 07/05/2023    HCT 41.0 07/05/2023    MCV 73 (L) 07/05/2023     07/05/2023       Microbiology:  No Pertinent Microbiology    Imaging:  - CT abdomen and pelvis with contrast (6/5/23): Scattered pneumoperitoneum in the lower anterior mesentery, noting postoperative change of recent appendectomy. Small bowel obstruction with suspected transition points in the proximal jejunum and distal ileum as detailed above. Sigmoid diverticulosis adjacent to the postoperative region. Splenomegaly.    - US Doppler Liver (1/25/23): Satisfactory post op hepatic vascular status. Small amount of  ascites.    Assessment:  Tej Purdy is a 67 y.o. male who is presenting for initial evaluation of nausea & loss of appetite.  He is status post appendectomy on 06/02 showing perforated appendix with intra-abdominal abscess. Has been having anorexia with significant weight loss. Has regurgitation concerning for rumination syndrome. Last colonoscopy in 2020 apparently showed multiple polyps (records not available). EGD in 2021 confirmed Aragon's esophagus without dysplasia. On sertraline, PPI & PRN Zofran.    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    Given suspicion of rumination syndrome, will order esophageal manometry.  Patient counseled on diaphragmatic breathing & shown video in clinic.  P.r.n. Zofran for nausea.  Continue p.o. PPI q.day.  On follow-up visit, may consider starting patient on Remeron for appetite stimulation.  We will discuss this with hepatologist beforehand.    Given poor dentition, advised patient to follow up regularly with a dentist.  Management of immunosuppression per hepatologist.   Follow up in about 2 months (around 9/12/2023).    Radames Lam MD  Gastroenterology Fellow    No orders of the defined types were placed in this encounter.

## 2023-07-11 NOTE — TELEPHONE ENCOUNTER
----- Message from Aniya Amaya RN sent at 7/7/2023 10:42 AM CDT -----  We referred him to see Gastro in May for loss of appetite and nausea.  He was given an appointment, and needed to reschedule, in the interim needed an emergent appendectomy.  He sent me a message today with still with loss of appetite and energy.  So I wanted to see if we could get him seen.  Let me know if I need a new referral.    Thanks   Aniya

## 2023-07-12 ENCOUNTER — OFFICE VISIT (OUTPATIENT)
Dept: GASTROENTEROLOGY | Facility: CLINIC | Age: 68
End: 2023-07-12
Payer: MEDICARE

## 2023-07-12 VITALS
BODY MASS INDEX: 27.26 KG/M2 | HEART RATE: 89 BPM | DIASTOLIC BLOOD PRESSURE: 81 MMHG | SYSTOLIC BLOOD PRESSURE: 149 MMHG | HEIGHT: 72 IN | WEIGHT: 201.25 LBS

## 2023-07-12 DIAGNOSIS — R63.0 LOSS OF APPETITE: ICD-10-CM

## 2023-07-12 DIAGNOSIS — R63.4 WEIGHT LOSS: ICD-10-CM

## 2023-07-12 DIAGNOSIS — R11.0 NAUSEA: ICD-10-CM

## 2023-07-12 DIAGNOSIS — R11.10 RUMINATION SYNDROME OF INGESTED FOOD IN ADULT: Primary | ICD-10-CM

## 2023-07-12 PROCEDURE — 3077F SYST BP >= 140 MM HG: CPT | Mod: CPTII,GC,S$GLB, | Performed by: STUDENT IN AN ORGANIZED HEALTH CARE EDUCATION/TRAINING PROGRAM

## 2023-07-12 PROCEDURE — 1101F PR PT FALLS ASSESS DOC 0-1 FALLS W/OUT INJ PAST YR: ICD-10-PCS | Mod: CPTII,GC,S$GLB, | Performed by: STUDENT IN AN ORGANIZED HEALTH CARE EDUCATION/TRAINING PROGRAM

## 2023-07-12 PROCEDURE — 3079F PR MOST RECENT DIASTOLIC BLOOD PRESSURE 80-89 MM HG: ICD-10-PCS | Mod: CPTII,GC,S$GLB, | Performed by: STUDENT IN AN ORGANIZED HEALTH CARE EDUCATION/TRAINING PROGRAM

## 2023-07-12 PROCEDURE — 99204 OFFICE O/P NEW MOD 45 MIN: CPT | Mod: GC,S$GLB,, | Performed by: STUDENT IN AN ORGANIZED HEALTH CARE EDUCATION/TRAINING PROGRAM

## 2023-07-12 PROCEDURE — 3044F HG A1C LEVEL LT 7.0%: CPT | Mod: CPTII,GC,S$GLB, | Performed by: STUDENT IN AN ORGANIZED HEALTH CARE EDUCATION/TRAINING PROGRAM

## 2023-07-12 PROCEDURE — 99204 PR OFFICE/OUTPT VISIT, NEW, LEVL IV, 45-59 MIN: ICD-10-PCS | Mod: GC,S$GLB,, | Performed by: STUDENT IN AN ORGANIZED HEALTH CARE EDUCATION/TRAINING PROGRAM

## 2023-07-12 PROCEDURE — 3079F DIAST BP 80-89 MM HG: CPT | Mod: CPTII,GC,S$GLB, | Performed by: STUDENT IN AN ORGANIZED HEALTH CARE EDUCATION/TRAINING PROGRAM

## 2023-07-12 PROCEDURE — 1126F AMNT PAIN NOTED NONE PRSNT: CPT | Mod: CPTII,GC,S$GLB, | Performed by: STUDENT IN AN ORGANIZED HEALTH CARE EDUCATION/TRAINING PROGRAM

## 2023-07-12 PROCEDURE — 3008F BODY MASS INDEX DOCD: CPT | Mod: CPTII,GC,S$GLB, | Performed by: STUDENT IN AN ORGANIZED HEALTH CARE EDUCATION/TRAINING PROGRAM

## 2023-07-12 PROCEDURE — 99999 PR PBB SHADOW E&M-EST. PATIENT-LVL V: ICD-10-PCS | Mod: PBBFAC,GC,, | Performed by: STUDENT IN AN ORGANIZED HEALTH CARE EDUCATION/TRAINING PROGRAM

## 2023-07-12 PROCEDURE — 3077F PR MOST RECENT SYSTOLIC BLOOD PRESSURE >= 140 MM HG: ICD-10-PCS | Mod: CPTII,GC,S$GLB, | Performed by: STUDENT IN AN ORGANIZED HEALTH CARE EDUCATION/TRAINING PROGRAM

## 2023-07-12 PROCEDURE — 1159F MED LIST DOCD IN RCRD: CPT | Mod: CPTII,GC,S$GLB, | Performed by: STUDENT IN AN ORGANIZED HEALTH CARE EDUCATION/TRAINING PROGRAM

## 2023-07-12 PROCEDURE — 3288F PR FALLS RISK ASSESSMENT DOCUMENTED: ICD-10-PCS | Mod: CPTII,GC,S$GLB, | Performed by: STUDENT IN AN ORGANIZED HEALTH CARE EDUCATION/TRAINING PROGRAM

## 2023-07-12 PROCEDURE — 3044F PR MOST RECENT HEMOGLOBIN A1C LEVEL <7.0%: ICD-10-PCS | Mod: CPTII,GC,S$GLB, | Performed by: STUDENT IN AN ORGANIZED HEALTH CARE EDUCATION/TRAINING PROGRAM

## 2023-07-12 PROCEDURE — 99999 PR PBB SHADOW E&M-EST. PATIENT-LVL V: CPT | Mod: PBBFAC,GC,, | Performed by: STUDENT IN AN ORGANIZED HEALTH CARE EDUCATION/TRAINING PROGRAM

## 2023-07-12 PROCEDURE — 3008F PR BODY MASS INDEX (BMI) DOCUMENTED: ICD-10-PCS | Mod: CPTII,GC,S$GLB, | Performed by: STUDENT IN AN ORGANIZED HEALTH CARE EDUCATION/TRAINING PROGRAM

## 2023-07-12 PROCEDURE — 1159F PR MEDICATION LIST DOCUMENTED IN MEDICAL RECORD: ICD-10-PCS | Mod: CPTII,GC,S$GLB, | Performed by: STUDENT IN AN ORGANIZED HEALTH CARE EDUCATION/TRAINING PROGRAM

## 2023-07-12 PROCEDURE — 3288F FALL RISK ASSESSMENT DOCD: CPT | Mod: CPTII,GC,S$GLB, | Performed by: STUDENT IN AN ORGANIZED HEALTH CARE EDUCATION/TRAINING PROGRAM

## 2023-07-12 PROCEDURE — 1126F PR PAIN SEVERITY QUANTIFIED, NO PAIN PRESENT: ICD-10-PCS | Mod: CPTII,GC,S$GLB, | Performed by: STUDENT IN AN ORGANIZED HEALTH CARE EDUCATION/TRAINING PROGRAM

## 2023-07-12 PROCEDURE — 1101F PT FALLS ASSESS-DOCD LE1/YR: CPT | Mod: CPTII,GC,S$GLB, | Performed by: STUDENT IN AN ORGANIZED HEALTH CARE EDUCATION/TRAINING PROGRAM

## 2023-07-12 NOTE — PATIENT INSTRUCTIONS
Please perform diaphragmatic breathing 5 mins before meals and during meals:  [email or web address]://[email or web address]/[email or web address]     Use Zofran as needed for nausea    Continue Protonix - take 1 tablet 40-60 minutes before breakfast    Continue sertraline     See a dentist regularly     Please schedule EGD, colonoscopy & manometry    Follow up with me in 2 months

## 2023-07-12 NOTE — PROGRESS NOTES
GENERAL GI PATIENT INTAKE:    COVID symptoms in the last 7 days (runny nose, sore throat, congestion, cough, fever): No  PCP: Thais Maxwell  If not PCP-  number given to establish [phone]: N/A    ALLERGIES REVIEWED:  Yes    CHIEF COMPLAINT:    Chief Complaint   Patient presents with    Initial Visit    Nausea    Emesis    Abdominal Pain    Constipation    Weight Loss       VITAL SIGNS:  Ht 6' (1.829 m)   Wt 91.3 kg (201 lb 4.5 oz)   BMI 27.30 kg/m²      Change in medical, surgical, family or social history: No      REVIEWED MEDICATION LIST RECONCILED INCLUDING ABOVE MEDS:  Yes

## 2023-07-12 NOTE — Clinical Note
67 y.o. male who is presenting for initial evaluation of nausea & loss of appetite.  He is status post appendectomy on 06/02 showing perforated appendix with intra-abdominal abscess. Has been having anorexia with significant weight loss. Has regurgitation concerning for rumination syndrome. Last colonoscopy in 2020 apparently showed multiple polyps (records not available). EGD in 2021 confirmed Aragon's esophagus without dysplasia.    - Ordering EGD, Cscope & manometry.  - Continue Zofran and PPI. Will consider Remeron on next visit.  - Diaphragmatic breathing

## 2023-07-12 NOTE — PROGRESS NOTES
Patient examined, record reviewed, agree with findings and recommendations as documented above.   S/P laminectomy

## 2023-07-13 ENCOUNTER — TELEPHONE (OUTPATIENT)
Dept: ENDOSCOPY | Facility: HOSPITAL | Age: 68
End: 2023-07-13
Payer: MEDICARE

## 2023-07-13 VITALS — BODY MASS INDEX: 27.09 KG/M2 | HEIGHT: 72 IN | WEIGHT: 200 LBS

## 2023-07-13 NOTE — TELEPHONE ENCOUNTER
Spoke to Tej to schedule procedure(s) Esophageal Manometry       Physician to perform procedure(s) Dr. HEATHER Hinkle  Date of Procedure (s) 7/17/23  Arrival Time 9:00 AM  Time of Procedure(s) 10:00 AM   Location of Procedure(s) [place] 4th Floor  Type of Rx Prep sent to patient: N/A  Instructions provided to patient via [email or web address]/email    Patient was informed on the following information and verbalized understanding. Screening questionnaire reviewed with patient and complete. If procedure requires anesthesia, a responsible adult needs to be present to accompany the patient home, patient cannot drive after receiving anesthesia. Appointment details are tentative, especially check-in time. Patient will receive a prep-op call 4 days prior to confirm check-in time for procedure. If applicable the patient should contact their pharmacy to verify Rx for procedure prep is ready for pick-up. Patient was advised to call the scheduling department at [phone] if pharmacy states no Rx is available. Patient was advised to call the endoscopy scheduling department if any questions or concerns arise.      [facility] Scheduling Department

## 2023-07-13 NOTE — TELEPHONE ENCOUNTER
"----- Message from Radames Lam MD sent at 2023  1:28 PM CDT -----  Regarding: EGD/Colonoscopy  Procedure: EGD/Colonoscopy AND ESOPHAGEAL MANOMETRY    Diagnosis: Surveillance colonoscopy - Hx of colon polyps, Weight loss, and Nausea/Vomiting    Procedure Timin-4 weeks    #If within 4 weeks selected, please martha as high priority#    #If greater than 12 weeks, please select "5-12 weeks" and delay sending until 2 months prior to requested date#     Provider: Any GI provider    Location: [address]    Additional Scheduling Information: Blood thinners (Eliquis)    Prep Specifications:Standard prep    Have you attached a patient to this message: yes     PLEASE NOTE: ALSO ORDERING ESOPHAGEAL MANOMETRY TO BE DONE ON SAME DAY    "

## 2023-07-16 DIAGNOSIS — E83.42 HYPOMAGNESEMIA: ICD-10-CM

## 2023-07-17 ENCOUNTER — HOSPITAL ENCOUNTER (OUTPATIENT)
Facility: HOSPITAL | Age: 68
Discharge: HOME | End: 2023-07-17
Attending: INTERNAL MEDICINE | Admitting: STUDENT IN AN ORGANIZED HEALTH CARE EDUCATION/TRAINING PROGRAM
Payer: MEDICARE

## 2023-07-17 VITALS
BODY MASS INDEX: 27.09 KG/M2 | TEMPERATURE: 98 F | WEIGHT: 200 LBS | HEIGHT: 72 IN | RESPIRATION RATE: 16 BRPM | OXYGEN SATURATION: 95 % | HEART RATE: 62 BPM | DIASTOLIC BLOOD PRESSURE: 63 MMHG | SYSTOLIC BLOOD PRESSURE: 142 MMHG

## 2023-07-17 DIAGNOSIS — R11.10 REGURGITATION OF FOOD: ICD-10-CM

## 2023-07-17 LAB — POCT GLUCOSE: 131 MG/DL (ref 70–110)

## 2023-07-17 PROCEDURE — 25000003 PHARM REV CODE 250: Performed by: INTERNAL MEDICINE

## 2023-07-17 PROCEDURE — 91010 ESOPHAGUS MOTILITY STUDY: CPT | Mod: 74 | Performed by: INTERNAL MEDICINE

## 2023-07-17 RX ORDER — LANOLIN ALCOHOL/MO/W.PET/CERES
800 CREAM (GRAM) TOPICAL 3 TIMES DAILY
Qty: 180 TABLET | Refills: 6 | Status: ON HOLD | OUTPATIENT
Start: 2023-07-17 | End: 2024-01-30 | Stop reason: CLARIF

## 2023-07-17 RX ORDER — LIDOCAINE HYDROCHLORIDE 20 MG/ML
JELLY TOPICAL ONCE
Status: COMPLETED | OUTPATIENT
Start: 2023-07-17 | End: 2023-07-17

## 2023-07-17 RX ADMIN — LIDOCAINE HYDROCHLORIDE 10 ML: 20 JELLY TOPICAL at 10:07

## 2023-07-17 NOTE — OR NURSING
Esophageal manometry aborted during catheter placement phase due do extremely sensitive gag reflex. Unable to tolerate. Catheter removed per patient request. Patient stated he may not be able to tolerate during the admit process of today's visit. Catheter passed easily but patient unable to suppress gag reflex. Plan to reschedule for sedation assisted placement. Informed this is alternative but no guarantee. Gag reflex may still prevent completing exam. Patient agreed with plan.  notified. No pain at time of discharge.

## 2023-07-18 ENCOUNTER — TELEPHONE (OUTPATIENT)
Dept: ENDOSCOPY | Facility: HOSPITAL | Age: 68
End: 2023-07-18
Payer: MEDICARE

## 2023-07-18 VITALS — WEIGHT: 200 LBS | BODY MASS INDEX: 27.09 KG/M2 | HEIGHT: 72 IN

## 2023-07-18 DIAGNOSIS — R13.10 DYSPHAGIA, UNSPECIFIED TYPE: ICD-10-CM

## 2023-07-18 DIAGNOSIS — R11.10 REGURGITATION OF FOOD: Primary | ICD-10-CM

## 2023-07-18 NOTE — TELEPHONE ENCOUNTER
"----- Message from Radames Lam MD sent at 2023 10:57 AM CDT -----  Regarding: Re-schedule EGD & esophageal manometry  Procedure: EGD with esophageal manometry (catheter to be placed under EGD guidance)    Diagnosis: Dysphagia and Nausea/Vomiting    Procedure Timin-4 weeks    #If within 4 weeks selected, please martha as high priority#    #If greater than 12 weeks, please select "5-12 weeks" and delay sending until 2 months prior to requested date#     Provider: Any GI provider    Location: [address]    Additional Scheduling Information: s/p transplant    Prep Specifications:N/A    Have you attached a patient to this message: yes       Note: Patient could not tolerate manometry catheter placement. So we will do an EGD first & catheter to be placed under EGD guidance.    "

## 2023-07-18 NOTE — TELEPHONE ENCOUNTER
Contacted the patient to schedule an endoscopy procedure(s) 7/18/23. The patient did not answer the call and left a voice message requesting a call back.

## 2023-07-18 NOTE — TELEPHONE ENCOUNTER
Dear Dr Tilley,    Patient has a scheduled procedure Upper Endoscopy (EGD) on 9/28/23 and is currently taking a blood thinner prescribed by your office. In order to ensure patient safety, we would like to confirm that the patient can place their blood thinner medication on hold for the procedure. Can he/she discontinue Eliquis (apixaban) for a minimum of 2 days prior to the procedure?     Thank you for your prompt reply.    [facility] Endoscopy Scheduling

## 2023-07-18 NOTE — TELEPHONE ENCOUNTER
"----- Message from Radames Lam MD sent at 2023 10:57 AM CDT -----  Regarding: Re-schedule EGD & esophageal manometry  Procedure: EGD with esophageal manometry (catheter to be placed under EGD guidance)    Diagnosis: Dysphagia and Nausea/Vomiting    Procedure Timin-4 weeks    #If within 4 weeks selected, please martha as high priority#    #If greater than 12 weeks, please select "5-12 weeks" and delay sending until 2 months prior to requested date#     Provider: Any GI provider    Location: [address]    Additional Scheduling Information: s/p transplant    Prep Specifications:N/A    Have you attached a patient to this message: yes       Note: Patient could not tolerate manometry catheter placement. So we will do an EGD first & catheter to be placed under EGD guidance.    "

## 2023-07-18 NOTE — TELEPHONE ENCOUNTER
Our clearance to hold anticoagulation is typically only good for 30 days (in case patient has to undergo a cardiac procedure and be unable to hold OAC post procedure). Please resubmit clearance request within the 30 day window.  Thanks!

## 2023-07-18 NOTE — TELEPHONE ENCOUNTER
Spoke to Tej to schedule procedure(s) Esophageal Manometry/egd       Physician to perform procedure(s) Dr. JS Samano  Date of Procedure (s) 9/28/23  Arrival Time 7:30 AM  Time of Procedure(s) 8:30 AM   Location of Procedure(s) [address]  Type of Rx Prep sent to patient: Other  Instructions provided to patient via [facility]    Patient was informed on the following information and verbalized understanding. Screening questionnaire reviewed with patient and complete. If procedure requires anesthesia, a responsible adult needs to be present to accompany the patient home, patient cannot drive after receiving anesthesia. Appointment details are tentative, especially check-in time. Patient will receive a prep-op call 4 days prior to confirm check-in time for procedure. If applicable the patient should contact their pharmacy to verify Rx for procedure prep is ready for pick-up. Patient was advised to call the scheduling department at [phone] if pharmacy states no Rx is available. Patient was advised to call the endoscopy scheduling department if any questions or concerns arise.      [facility]oscopy Scheduling Department

## 2023-07-21 ENCOUNTER — EXTERNAL HOME HEALTH (OUTPATIENT)
Dept: HOME HEALTH SERVICES | Facility: HOSPITAL | Age: 68
End: 2023-07-21
Payer: MEDICARE

## 2023-07-24 ENCOUNTER — PATIENT MESSAGE (OUTPATIENT)
Dept: TRANSPLANT | Facility: CLINIC | Age: 68
End: 2023-07-24
Payer: MEDICARE

## 2023-07-24 DIAGNOSIS — K76.6 PORTAL HYPERTENSION: ICD-10-CM

## 2023-07-24 DIAGNOSIS — Z76.82 AWAITING LIVER TRANSPLANT: ICD-10-CM

## 2023-07-24 DIAGNOSIS — E83.42 HYPOMAGNESEMIA: Primary | ICD-10-CM

## 2023-07-24 DIAGNOSIS — K74.69 OTHER CIRRHOSIS OF LIVER: ICD-10-CM

## 2023-07-24 RX ORDER — PANTOPRAZOLE SODIUM 40 MG/1
40 TABLET, DELAYED RELEASE ORAL DAILY
Qty: 30 TABLET | Refills: 5 | Status: SHIPPED | OUTPATIENT
Start: 2023-07-24 | End: 2024-02-14 | Stop reason: SDUPTHER

## 2023-07-25 ENCOUNTER — LAB VISIT (OUTPATIENT)
Dept: LAB | Facility: HOSPITAL | Age: 68
End: 2023-07-25
Attending: INTERNAL MEDICINE
Payer: MEDICARE

## 2023-07-25 DIAGNOSIS — Z94.4 S/P LIVER TRANSPLANT: ICD-10-CM

## 2023-07-25 DIAGNOSIS — E83.42 HYPOMAGNESEMIA: ICD-10-CM

## 2023-07-25 LAB
ALBUMIN SERPL BCP-MCNC: 3.7 G/DL (ref 3.5–5.2)
ALP SERPL-CCNC: 72 U/L (ref 55–135)
ALT SERPL W/O P-5'-P-CCNC: 16 U/L (ref 10–44)
ANION GAP SERPL CALC-SCNC: 10 MMOL/L (ref 8–16)
AST SERPL-CCNC: 19 U/L (ref 10–40)
BASOPHILS # BLD AUTO: 0.1 K/UL (ref 0–0.2)
BASOPHILS NFR BLD: 1.2 % (ref 0–1.9)
BILIRUB SERPL-MCNC: 0.4 MG/DL (ref 0.1–1)
BUN SERPL-MCNC: 15 MG/DL (ref 8–23)
CALCIUM SERPL-MCNC: 9 MG/DL (ref 8.7–10.5)
CHLORIDE SERPL-SCNC: 102 MMOL/L (ref 95–110)
CO2 SERPL-SCNC: 27 MMOL/L (ref 23–29)
CREAT SERPL-MCNC: 1 MG/DL (ref 0.5–1.4)
DIFFERENTIAL METHOD: ABNORMAL
EOSINOPHIL # BLD AUTO: 0.2 K/UL (ref 0–0.5)
EOSINOPHIL NFR BLD: 2.8 % (ref 0–8)
ERYTHROCYTE [DISTWIDTH] IN BLOOD BY AUTOMATED COUNT: 19 % (ref 11.5–14.5)
EST. GFR  (NO RACE VARIABLE): >60 ML/MIN/1.73 M^2
GLUCOSE SERPL-MCNC: 119 MG/DL (ref 70–110)
HCT VFR BLD AUTO: 37.8 % (ref 40–54)
HGB BLD-MCNC: 10.7 G/DL (ref 14–18)
IMM GRANULOCYTES # BLD AUTO: 0.03 K/UL (ref 0–0.04)
IMM GRANULOCYTES NFR BLD AUTO: 0.4 % (ref 0–0.5)
LYMPHOCYTES # BLD AUTO: 1.6 K/UL (ref 1–4.8)
LYMPHOCYTES NFR BLD: 19.7 % (ref 18–48)
MAGNESIUM SERPL-MCNC: 1.9 MG/DL (ref 1.6–2.6)
MCH RBC QN AUTO: 20.9 PG (ref 27–31)
MCHC RBC AUTO-ENTMCNC: 28.3 G/DL (ref 32–36)
MCV RBC AUTO: 74 FL (ref 82–98)
MONOCYTES # BLD AUTO: 1 K/UL (ref 0.3–1)
MONOCYTES NFR BLD: 11.9 % (ref 4–15)
NEUTROPHILS # BLD AUTO: 5.3 K/UL (ref 1.8–7.7)
NEUTROPHILS NFR BLD: 64 % (ref 38–73)
NRBC BLD-RTO: 0 /100 WBC
PLATELET # BLD AUTO: ABNORMAL K/UL (ref 150–450)
PLATELET BLD QL SMEAR: ABNORMAL
PMV BLD AUTO: ABNORMAL FL (ref 9.2–12.9)
POTASSIUM SERPL-SCNC: 4.2 MMOL/L (ref 3.5–5.1)
PROT SERPL-MCNC: 7.2 G/DL (ref 6–8.4)
RBC # BLD AUTO: 5.13 M/UL (ref 4.6–6.2)
SODIUM SERPL-SCNC: 139 MMOL/L (ref 136–145)
WBC # BLD AUTO: 8.32 K/UL (ref 3.9–12.7)

## 2023-07-25 PROCEDURE — 83735 ASSAY OF MAGNESIUM: CPT | Performed by: INTERNAL MEDICINE

## 2023-07-25 PROCEDURE — 80053 COMPREHEN METABOLIC PANEL: CPT | Performed by: INTERNAL MEDICINE

## 2023-07-25 PROCEDURE — 85025 COMPLETE CBC W/AUTO DIFF WBC: CPT | Performed by: INTERNAL MEDICINE

## 2023-07-25 PROCEDURE — 36415 COLL VENOUS BLD VENIPUNCTURE: CPT | Mod: PN | Performed by: INTERNAL MEDICINE

## 2023-07-25 PROCEDURE — 80197 ASSAY OF TACROLIMUS: CPT | Performed by: INTERNAL MEDICINE

## 2023-07-26 LAB — TACROLIMUS BLD-MCNC: 4.9 NG/ML (ref 5–15)

## 2023-07-28 ENCOUNTER — TELEPHONE (OUTPATIENT)
Dept: TRANSPLANT | Facility: CLINIC | Age: 68
End: 2023-07-28
Payer: MEDICARE

## 2023-07-28 ENCOUNTER — PATIENT MESSAGE (OUTPATIENT)
Dept: TRANSPLANT | Facility: CLINIC | Age: 68
End: 2023-07-28
Payer: MEDICARE

## 2023-07-28 NOTE — TELEPHONE ENCOUNTER
Sent patient message via portal to let him know labs are stable.  No medication changes, next labs due on 08/21/23      ----- Message from Thais Maxwell MD sent at 7/28/2023  3:00 PM CDT -----  The Labs are stable - please let patient know.

## 2023-08-01 PROCEDURE — 91010 PR ESOPHAGEAL MOTILITY STUDY, MA2METRY: ICD-10-PCS | Mod: 26,53,, | Performed by: INTERNAL MEDICINE

## 2023-08-01 PROCEDURE — 91010 ESOPHAGUS MOTILITY STUDY: CPT | Mod: 26,53,, | Performed by: INTERNAL MEDICINE

## 2023-08-01 PROCEDURE — 91037 ESOPH IMPED FUNCTION TEST: CPT | Mod: 26,53,, | Performed by: INTERNAL MEDICINE

## 2023-08-01 PROCEDURE — 91037 PR GERD TST W/ NASAL IMPEDENCE ELECTROD: ICD-10-PCS | Mod: 26,53,, | Performed by: INTERNAL MEDICINE

## 2023-08-01 NOTE — PROVATION PATIENT INSTRUCTIONS
Discharge Summary/Instructions after an Endoscopic Procedure  Patient Name: Tej Purdy  Patient MRN: [id number]  Patient YOB: 1955  Monday, July 17, 2023  Judy Hinkle MD  Dear patient,  As a result of recent federal legislation (The Federal Cures Act), you may   receive lab or pathology results from your procedure in your [facility]   account before your physician is able to contact you. Your physician or   their representative will relay the results to you with their   recommendations at their soonest availability.  Thank you,  RESTRICTIONS:  During your procedure today, you received medications for sedation.  These   medications may affect your judgment, balance and coordination.  Therefore,   for 24 hours, you have the following restrictions:   - DO NOT drive a car, operate machinery, make legal/financial decisions,   sign important papers or drink alcohol.    ACTIVITY:  Today: no heavy lifting, straining or running due to procedural   sedation/anesthesia.  The following day: return to full activity including work.  DIET:  Eat and drink normally unless instructed otherwise.     TREATMENT FOR COMMON SIDE EFFECTS:  - Mild abdominal pain, nausea, belching, bloating or excessive gas:  rest,   eat lightly and use a heating pad.  - Sore Throat: treat with throat lozenges and/or gargle with warm salt   water.  - Because air was used during the procedure, expelling large amounts of air   from your rectum or belching is normal.  - If a bowel prep was taken, you may not have a bowel movement for 1-3 days.    This is normal.  SYMPTOMS TO WATCH FOR AND REPORT TO YOUR PHYSICIAN:  1. Abdominal pain or bloating, other than gas cramps.  2. Chest pain.  3. Back pain.  4. Signs of infection such as: chills or fever occurring within 24 hours   after the procedure.  5. Rectal bleeding, which would show as bright red, maroon, or black stools.   (A tablespoon of blood from the rectum is not serious, especially if    hemorrhoids are present.)  6. Vomiting.  7. Weakness or dizziness.  GO DIRECTLY TO THE NEAREST EMERGENCY ROOM IF YOU HAVE ANY OF THE FOLLOWING:      Difficulty breathing              Chills and/or fever over 101 F   Persistent vomiting and/or vomiting blood   Severe abdominal pain   Severe chest pain   Black, tarry stools   Bleeding- more than one tablespoon   Any other symptom or condition that you feel may need urgent attention  Your doctor recommends these additional instructions:  If any biopsies were taken, your doctors clinic will contact you in 1 to 2   weeks with any results.  - Return to referring physician as previously scheduled.  For questions, problems or results please call your physician - Judy Hinkle MD at Work:  [phone].  [facility], EMERGENCY ROOM PHONE NUMBER: [phone]  IF A COMPLICATION OR EMERGENCY SITUATION ARISES AND YOU ARE UNABLE TO REACH   YOUR PHYSICIAN - GO DIRECTLY TO THE EMERGENCY ROOM.  Judy Hinkle MD  8/1/2023 8:59:29 AM  This report has been verified and signed electronically.  Dear patient,  As a result of recent federal legislation (The Federal Cures Act), you may   receive lab or pathology results from your procedure in your [facility]   account before your physician is able to contact you. Your physician or   their representative will relay the results to you with their   recommendations at their soonest availability.  Thank you,  PROVATION

## 2023-08-13 ENCOUNTER — PATIENT MESSAGE (OUTPATIENT)
Dept: TRANSPLANT | Facility: CLINIC | Age: 68
End: 2023-08-13
Payer: MEDICARE

## 2023-08-14 RX ORDER — SERTRALINE HYDROCHLORIDE 25 MG/1
25 TABLET, FILM COATED ORAL DAILY
Qty: 30 TABLET | Refills: 5 | OUTPATIENT
Start: 2023-08-14

## 2023-08-17 ENCOUNTER — PATIENT MESSAGE (OUTPATIENT)
Dept: TRANSPLANT | Facility: CLINIC | Age: 68
End: 2023-08-17
Payer: MEDICARE

## 2023-08-18 RX ORDER — SERTRALINE HYDROCHLORIDE 25 MG/1
25 TABLET, FILM COATED ORAL DAILY
Qty: 30 TABLET | Refills: 5 | Status: SHIPPED | OUTPATIENT
Start: 2023-08-18 | End: 2024-01-26

## 2023-08-21 ENCOUNTER — OFFICE VISIT (OUTPATIENT)
Dept: TRANSPLANT | Facility: CLINIC | Age: 68
End: 2023-08-21
Attending: INTERNAL MEDICINE
Payer: MEDICARE

## 2023-08-21 ENCOUNTER — PATIENT MESSAGE (OUTPATIENT)
Dept: TRANSPLANT | Facility: CLINIC | Age: 68
End: 2023-08-21

## 2023-08-21 DIAGNOSIS — F41.9 ANXIETY: ICD-10-CM

## 2023-08-21 DIAGNOSIS — C44.90 SKIN CANCER: ICD-10-CM

## 2023-08-21 DIAGNOSIS — Z29.89 PROPHYLACTIC IMMUNOTHERAPY: Primary | ICD-10-CM

## 2023-08-21 DIAGNOSIS — C22.1 CHOLANGIOCARCINOMA: ICD-10-CM

## 2023-08-21 DIAGNOSIS — Z79.60 LONG-TERM USE OF IMMUNOSUPPRESSANT MEDICATION: ICD-10-CM

## 2023-08-21 DIAGNOSIS — Z94.4 S/P LIVER TRANSPLANT: ICD-10-CM

## 2023-08-21 DIAGNOSIS — R11.2 NAUSEA AND VOMITING, UNSPECIFIED VOMITING TYPE: ICD-10-CM

## 2023-08-21 DIAGNOSIS — R53.1 WEAKNESS: ICD-10-CM

## 2023-08-21 PROCEDURE — 3044F HG A1C LEVEL LT 7.0%: CPT | Mod: CPTII,95,, | Performed by: INTERNAL MEDICINE

## 2023-08-21 PROCEDURE — 99213 OFFICE O/P EST LOW 20 MIN: CPT | Mod: 95,,, | Performed by: INTERNAL MEDICINE

## 2023-08-21 PROCEDURE — 99213 PR OFFICE/OUTPT VISIT, EST, LEVL III, 20-29 MIN: ICD-10-PCS | Mod: 95,,, | Performed by: INTERNAL MEDICINE

## 2023-08-21 PROCEDURE — 3044F PR MOST RECENT HEMOGLOBIN A1C LEVEL <7.0%: ICD-10-PCS | Mod: CPTII,95,, | Performed by: INTERNAL MEDICINE

## 2023-08-21 PROCEDURE — 1159F PR MEDICATION LIST DOCUMENTED IN MEDICAL RECORD: ICD-10-PCS | Mod: CPTII,95,, | Performed by: INTERNAL MEDICINE

## 2023-08-21 PROCEDURE — 1159F MED LIST DOCD IN RCRD: CPT | Mod: CPTII,95,, | Performed by: INTERNAL MEDICINE

## 2023-08-21 PROCEDURE — 1160F RVW MEDS BY RX/DR IN RCRD: CPT | Mod: CPTII,95,, | Performed by: INTERNAL MEDICINE

## 2023-08-21 PROCEDURE — 1160F PR REVIEW ALL MEDS BY PRESCRIBER/CLIN PHARMACIST DOCUMENTED: ICD-10-PCS | Mod: CPTII,95,, | Performed by: INTERNAL MEDICINE

## 2023-08-21 NOTE — PATIENT INSTRUCTIONS
Speak to cardiologist re medicine for cholesterol  Referral to psychiatry for anxiety  GI referral for N/V  F/u with DR Zuñiga- could amiodarone be causing N/V (>10% of people get that)

## 2023-08-21 NOTE — Clinical Note
Dr Washburn, Hoping you can see this tx pt for me. Liver doing well (1 year and 7 months post tx) now with N/V/dreaves. I have ordered EGD. Stopped cellcept -did not help

## 2023-08-21 NOTE — LETTER
August 21, 2023        Jessika Carbone  [address]  [facility] [address]  Phone: [phone]  Fax: [phone]             [facility] - Liver Transplant  [address] [address]  Phone: [phone]  Fax: [phone]   Patient: Tej Puryd   MR Number: [id number]   YOB: 1955   Date of Visit: 8/21/2023       Dear Dr. Jessika Carbone    Thank you for referring Tej Purdy to me for evaluation. Attached you will find relevant portions of my assessment and plan of care.    If you have questions, please do not hesitate to call me. I look forward to following Tej Purdy along with you.    Sincerely,    Thais Maxwell MD    Enclosure    If you would like to receive this communication electronically, please contact [email or web address] or [phone] to request [employer] access.    [employer] is a tool which provides read-only access to select patient information with whom you have a relationship. Its easy to use and provides real time access to review your patients record including encounter summaries, notes, results, and demographic information.    If you feel you have received this communication in error or would no longer like to receive these types of communications, please e-mail [email or web address]

## 2023-08-21 NOTE — PROGRESS NOTES
The patient location is: [place], [place]  The chief complaint leading to consultation is: f/u transplanted liver    Visit type: audiovisual    Face to Face time with patient: 15 minutes  30 minutes of total time spent on the encounter, which includes face to face time and non-face to face time preparing to see the patient (eg, review of tests), Obtaining and/or reviewing separately obtained history, Documenting clinical information in the electronic or other health record, Independently interpreting results (not separately reported) and communicating results to the patient/family/caregiver, or Care coordination (not separately reported).         Each patient to whom he or she provides medical services by telemedicine is:  (1) informed of the relationship between the physician and patient and the respective role of any other health care provider with respect to management of the patient; and (2) notified that he or she may decline to receive medical services by telemedicine and may withdraw from such care at any time.    Notes: Transplant Hepatology  Liver Transplant Recipient Follow-up    Transplant Date: 1/6/2022  UNOS Native Liver Dx: Primary Liver Malignancy: Hepatoma (HCC) (final path revealed cholangiocarcinoma) and Cirrhosis    Tej is here for follow up of his liver transplant.    ORGAN: LIVER  Whole or Partial: whole liver  Donor Type: donation after brain death  CDC High Risk: no  Donor CMV Status: Positive  Donor HCV Status: Negative  Donor HBcAb: Negative  Donor HBV JAYLIN:   Donor HCV JAYLIN: Negative  Biliary Anastomosis: end to end  Arterial Anatomy: replaced left hepatic from left gastric  IVC reconstruction: end to end ivc  Portal vein status: patent    EXPLANT: 7 cm tumor cholangiocarcinoma, necrotic, no vascular invasion, cirrhosis, 1 lymph node negative    He has had the following complications since transplant: none.     Subjective:     Interval History: Tej is now 1 year and 7 months post  liver transplant. Currently, he is not feeling well. Despite stopping cellcept because of GI symptoms he continues to have GI symptoms: intermittent nausea and dry heaving. He also has tremendout insomnia and anxiety. He could not complete the virtual visit today. His S.O. stayed on the line to review my plan.    Allograft function 7/25/23: ALT 16, AST 19, ALKP 72, Tbil 0.4, creat 1.0, prograf 4.9, CA 19-9 <2.1 on 5/8/23  IS: prograf   Immunoprophylaxis:  Aspirin: YES/NO(WHY) DOSE: 81 mg   Apixaban for AFIB    Abdo US doppler 1/25/23: satisfactory    Hx cholangiocarcinoma: saw oncology: adjuvant chemo deferred   Ct abdo/pelvis w contrast 6/5/23: no obvious cholangio; small fluid    Health maintenance  --colonoscopy-repeat 2023  --bone density 3/14/23: Osteopenia; Daily calcium intake 4038-5739 mg, dietary sources preferred; Vitamin D 7356-1872 IU daily; Weight bearing exercise and fall precautions; Repeat BMD in 2 years; repeat bone density 3/25    Review of Systems   Constitutional:  Positive for fatigue.   HENT: Negative.     Eyes: Negative.    Respiratory: Negative.     Cardiovascular: Negative.    Genitourinary: Negative.    Musculoskeletal: Negative.    Skin: Negative.    Psychiatric/Behavioral: Negative.         Objective:     There were no vitals filed for this visit.      Lab Results   Component Value Date    BILITOT 0.4 07/25/2023    AST 19 07/25/2023    ALT 16 07/25/2023    ALKPHOS 72 07/25/2023    CREATININE 1.0 07/25/2023    ALBUMIN 3.7 07/25/2023     Lab Results   Component Value Date    WBC 8.32 07/25/2023    HGB 10.7 (L) 07/25/2023    HCT 37.8 (L) 07/25/2023    HCT 33 (L) 01/06/2022    PLT SEE COMMENT 07/25/2023     Lab Results   Component Value Date    TACROLIMUS 4.9 (L) 07/25/2023       Assessment/Plan:   The patient is a 67 year old male who is now 1 year and 7 months post liver transplant. He has good allograft function but is overwhelmed with anxiety and GI symptoms. I wonder if the amiodarone he  is on may be causing his N/V (reported to occur in 10% who take this med). Current recommendations:  1. S/p liver transplant and control of IS: good allograft function, prograf target 5-6; 2. Prophylaxis:  Aspirin: YES/NO(WHY) DOSE: 81 mg   *Apixaban for AFIB*  3. Cholangiocarcinoma: chemotx deferred; surveillance imaging and cea/ca19-9 every 3 months  4. Ascites: resolved- continue off lasix  5.  Health maintenance: the patient should see a dermatologist annually to screen for skin cancer, perform regular colonoscopies (due  this year - 2023)  6. R/O osteoporosis. I am recommending a repeat bone density to r/o osteoporosis in 2025  7. Referral to psychiatry for evaluation of anxiety; rechekc TSH  8. F/u with cardiology re changing amiodarone to alt med to see if N/V improve/resolve; discuss cholesterol med with cardiologist  9. N/V: urgent GI referrall and EGD    Return 3 months    Thais Maxwell MD           [facility] Patient Status  Functional Status: 70% - Cares for self: unable to carry on normal activity or active work  Physical Capacity: No Limitations    New diabetes onset between last follow-up to the current follow-up: No  Did patient have any acute rejection episodes during the follow-up period: No  Post transplant malignancy: No

## 2023-08-22 ENCOUNTER — PATIENT MESSAGE (OUTPATIENT)
Dept: TRANSPLANT | Facility: CLINIC | Age: 68
End: 2023-08-22
Payer: MEDICARE

## 2023-08-23 ENCOUNTER — TELEPHONE (OUTPATIENT)
Dept: HEPATOLOGY | Facility: CLINIC | Age: 68
End: 2023-08-23
Payer: MEDICARE

## 2023-08-23 ENCOUNTER — TELEPHONE (OUTPATIENT)
Dept: TRANSPLANT | Facility: CLINIC | Age: 68
End: 2023-08-23
Payer: MEDICARE

## 2023-08-23 ENCOUNTER — HOSPITAL ENCOUNTER (EMERGENCY)
Facility: HOSPITAL | Age: 68
Discharge: PSYCHIATRIC OR SUBSTANCE USE FACILITY | End: 2023-08-23
Attending: STUDENT IN AN ORGANIZED HEALTH CARE EDUCATION/TRAINING PROGRAM
Payer: MEDICARE

## 2023-08-23 VITALS
SYSTOLIC BLOOD PRESSURE: 166 MMHG | TEMPERATURE: 99 F | HEART RATE: 65 BPM | WEIGHT: 200 LBS | BODY MASS INDEX: 27.09 KG/M2 | DIASTOLIC BLOOD PRESSURE: 76 MMHG | HEIGHT: 72 IN | RESPIRATION RATE: 18 BRPM | OXYGEN SATURATION: 99 %

## 2023-08-23 DIAGNOSIS — R45.851 DEPRESSION WITH SUICIDAL IDEATION: ICD-10-CM

## 2023-08-23 DIAGNOSIS — F32.A DEPRESSION WITH SUICIDAL IDEATION: ICD-10-CM

## 2023-08-23 DIAGNOSIS — R45.851 SUICIDAL IDEATION: Primary | ICD-10-CM

## 2023-08-23 DIAGNOSIS — Z00.8 MEDICAL CLEARANCE FOR PSYCHIATRIC ADMISSION: ICD-10-CM

## 2023-08-23 LAB
ADDITIONAL FINDINGS (OHS): ABNORMAL
ALBUMIN SERPL-MCNC: 4.2 G/DL (ref 3.4–4.8)
ALBUMIN/GLOB SERPL: 1.2 RATIO (ref 1.1–2)
ALP SERPL-CCNC: 71 UNIT/L (ref 40–150)
ALT SERPL-CCNC: 22 UNIT/L (ref 0–55)
AMPHET UR QL SCN: NEGATIVE
ANISOCYTOSIS BLD QL SMEAR: ABNORMAL
APAP SERPL-MCNC: <17.4 UG/ML (ref 17.4–30)
APPEARANCE UR: CLEAR
AST SERPL-CCNC: 24 UNIT/L (ref 5–34)
BACTERIA #/AREA URNS AUTO: NORMAL /HPF
BARBITURATE SCN PRESENT UR: NEGATIVE
BASOPHILS # BLD AUTO: 0.1 X10(3)/MCL
BASOPHILS NFR BLD AUTO: 1 %
BENZODIAZ UR QL SCN: NEGATIVE
BILIRUB SERPL-MCNC: 0.9 MG/DL
BILIRUB UR QL STRIP.AUTO: NEGATIVE
BUN SERPL-MCNC: 21.8 MG/DL (ref 8.4–25.7)
CALCIUM SERPL-MCNC: 10.2 MG/DL (ref 8.8–10)
CANNABINOIDS UR QL SCN: POSITIVE
CHLORIDE SERPL-SCNC: 100 MMOL/L (ref 98–107)
CO2 SERPL-SCNC: 24 MMOL/L (ref 23–31)
COCAINE UR QL SCN: NEGATIVE
COLOR UR: YELLOW
CREAT SERPL-MCNC: 1.16 MG/DL (ref 0.73–1.18)
EOSINOPHIL # BLD AUTO: 0.15 X10(3)/MCL (ref 0–0.9)
EOSINOPHIL NFR BLD AUTO: 1.6 %
ERYTHROCYTE [DISTWIDTH] IN BLOOD BY AUTOMATED COUNT: 19.2 % (ref 11.5–17)
ETHANOL SERPL-MCNC: <10 MG/DL
FENTANYL UR QL SCN: NEGATIVE
GFR SERPLBLD CREATININE-BSD FMLA CKD-EPI: >60 MLS/MIN/1.73/M2
GLOBULIN SER-MCNC: 3.6 GM/DL (ref 2.4–3.5)
GLUCOSE SERPL-MCNC: 125 MG/DL (ref 82–115)
GLUCOSE UR QL STRIP.AUTO: NEGATIVE
HCT VFR BLD AUTO: 39.4 % (ref 42–52)
HGB BLD-MCNC: 11.7 G/DL (ref 14–18)
IMM GRANULOCYTES # BLD AUTO: 0.03 X10(3)/MCL (ref 0–0.04)
IMM GRANULOCYTES NFR BLD AUTO: 0.3 %
KETONES UR QL STRIP.AUTO: ABNORMAL
LEUKOCYTE ESTERASE UR QL STRIP.AUTO: NEGATIVE
LYMPHOCYTES # BLD AUTO: 1.88 X10(3)/MCL (ref 0.6–4.6)
LYMPHOCYTES NFR BLD AUTO: 19.6 %
MACROCYTES BLD QL SMEAR: ABNORMAL
MCH RBC QN AUTO: 20 PG (ref 27–31)
MCHC RBC AUTO-ENTMCNC: 29.7 G/DL (ref 33–36)
MCV RBC AUTO: 67.2 FL (ref 80–94)
MDMA UR QL SCN: NEGATIVE
MICROCYTES BLD QL SMEAR: ABNORMAL
MONOCYTES # BLD AUTO: 1.07 X10(3)/MCL (ref 0.1–1.3)
MONOCYTES NFR BLD AUTO: 11.2 %
NEUTROPHILS # BLD AUTO: 6.35 X10(3)/MCL (ref 2.1–9.2)
NEUTROPHILS NFR BLD AUTO: 66.3 %
NITRITE UR QL STRIP.AUTO: NEGATIVE
NRBC BLD AUTO-RTO: 0 %
OPIATES UR QL SCN: NEGATIVE
PCP UR QL: NEGATIVE
PH UR STRIP.AUTO: 5.5 [PH]
PH UR: 5.5 [PH] (ref 3–11)
PLATELET # BLD AUTO: 204 X10(3)/MCL (ref 130–400)
PLATELET # BLD EST: NORMAL 10*3/UL
PMV BLD AUTO: 10.5 FL (ref 7.4–10.4)
POLYCHROMASIA BLD QL SMEAR: ABNORMAL
POTASSIUM SERPL-SCNC: 3.9 MMOL/L (ref 3.5–5.1)
PROT SERPL-MCNC: 7.8 GM/DL (ref 5.8–7.6)
PROT UR QL STRIP.AUTO: ABNORMAL
RBC # BLD AUTO: 5.86 X10(6)/MCL (ref 4.7–6.1)
RBC #/AREA URNS AUTO: <5 /HPF
RBC MORPH BLD: ABNORMAL
RBC UR QL AUTO: NEGATIVE
SALICYLATES SERPL-MCNC: <5 MG/DL (ref 15–30)
SARS-COV-2 RDRP RESP QL NAA+PROBE: NEGATIVE
SODIUM SERPL-SCNC: 137 MMOL/L (ref 136–145)
SP GR UR STRIP.AUTO: 1.02 (ref 1–1.03)
SQUAMOUS #/AREA URNS AUTO: <5 /HPF
TSH SERPL-ACNC: 1.61 UIU/ML (ref 0.35–4.94)
UROBILINOGEN UR STRIP-ACNC: 0.2
WBC # SPEC AUTO: 9.58 X10(3)/MCL (ref 4.5–11.5)
WBC #/AREA URNS AUTO: <5 /HPF

## 2023-08-23 PROCEDURE — 80179 DRUG ASSAY SALICYLATE: CPT | Performed by: PHYSICIAN ASSISTANT

## 2023-08-23 PROCEDURE — 84443 ASSAY THYROID STIM HORMONE: CPT | Performed by: PHYSICIAN ASSISTANT

## 2023-08-23 PROCEDURE — 80307 DRUG TEST PRSMV CHEM ANLYZR: CPT | Performed by: PHYSICIAN ASSISTANT

## 2023-08-23 PROCEDURE — 87635 SARS-COV-2 COVID-19 AMP PRB: CPT | Performed by: STUDENT IN AN ORGANIZED HEALTH CARE EDUCATION/TRAINING PROGRAM

## 2023-08-23 PROCEDURE — 85025 COMPLETE CBC W/AUTO DIFF WBC: CPT | Performed by: PHYSICIAN ASSISTANT

## 2023-08-23 PROCEDURE — 80143 DRUG ASSAY ACETAMINOPHEN: CPT | Performed by: PHYSICIAN ASSISTANT

## 2023-08-23 PROCEDURE — 99285 EMERGENCY DEPT VISIT HI MDM: CPT

## 2023-08-23 PROCEDURE — 82077 ASSAY SPEC XCP UR&BREATH IA: CPT | Performed by: PHYSICIAN ASSISTANT

## 2023-08-23 PROCEDURE — 80053 COMPREHEN METABOLIC PANEL: CPT | Performed by: PHYSICIAN ASSISTANT

## 2023-08-23 PROCEDURE — 25000003 PHARM REV CODE 250: Performed by: STUDENT IN AN ORGANIZED HEALTH CARE EDUCATION/TRAINING PROGRAM

## 2023-08-23 PROCEDURE — 81001 URINALYSIS AUTO W/SCOPE: CPT | Mod: 59 | Performed by: PHYSICIAN ASSISTANT

## 2023-08-23 RX ORDER — LORAZEPAM 1 MG/1
2 TABLET ORAL
Status: COMPLETED | OUTPATIENT
Start: 2023-08-23 | End: 2023-08-23

## 2023-08-23 RX ADMIN — LORAZEPAM 2 MG: 1 TABLET ORAL at 10:08

## 2023-08-23 RX ADMIN — LORAZEPAM 2 MG: 1 TABLET ORAL at 04:08

## 2023-08-23 NOTE — TELEPHONE ENCOUNTER
Received a message this morning via my chart from patient that seem to contain suicidal ideations, immediately sent secure chat to Dr Maxwell to let her know patient had not been sleeping and was not feeling well.  He was asking for me to make an appointment for him to see someone. I sent a message back to him to go to the ER.  He is currently in [place] with his significant other and had a video visit on Monday with Dr Maxwell.  Dr Maxwell reached out to her and she confirmed he was having thoughts of suicide, which it out of character for patient.  She advised her to take him to ER and if he refused to call 911.

## 2023-08-23 NOTE — ED PROVIDER NOTES
"Encounter Date: 8/23/2023    SCRIBE #1 NOTE: I, Lizz Mckinley, am scribing for, and in the presence of,  Hair Mock IV, MD. I have scribed the following portions of the note - Other sections scribed: HPI, ROS, PE, MDM.       History     Chief Complaint   Patient presents with    Suicidal     POV, ambulatory with steady gait, GCS 15. Pt reports liver transplant last year and has had difficulty with relaxing; has felt very anxious. Reports difficulty sleeping, "wanting to crawl out of my skin", suicidal thoughts. Lives in [place], here for personal visit; transplant team advised ED visit today. Pt tearful in triage "I just want to close my eyes and sleep." Denies auditory and visual hallucinations.     67 Y.O. male with a history of liver cirrohis s/p liver transplant, Afib, DM II, HTN, and HLD presents to the ED for SI onset 2 weeks ago. Pt states that he has not slept for more than an hour at a time in "a while". Having suicidal "thoughts". Will not really elaborate. Repeats that he just wants to be put to sleep. Complains of feeling "out of body", his senses being magnified, and the inability to turn his thoughts off. States that his lack of sleep is making him weak. Denies hx of depression or similar episodes. Pt's transplant coordinator is Thais Maxwell MD. PEC at 1036.    Per wife at bedside: Pt's transplant coordinator advised her to bring him to the ED after he expressed SI to them over the phone. She states that he told them that he has felt increasingly "lowsy" since his transplant and that he might hurt himself due to it. Pt's wife states that his mood/temper has always been bad, but that it is not as bad as it has been in the past. She denies violence at home. She further states that he has to be soothed to sleep due to his anxiety, but that he definitely sleeps through the night. Pt was taken of Ambien 6 months ago. Currently on Zoloft.     The history is provided by the patient and the spouse. 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    Cirrhosis 11/23/2020    Diabetes mellitus, type 2     Diabetic neuropathy 11/24/2020    Disorder of kidney and ureter     HTN (hypertension) 11/23/2020    Hyperlipidemia 11/23/2020    Kidney stones 11/23/2020    S/p lithotripsy 2020    Leukocytosis 1/15/2021    Liver mass 11/23/2020    CASTILLO (nonalcoholic steatohepatitis) 11/23/2020    Nausea and vomiting 8/21/2023    Other ascites 3/16/2022    PAD (peripheral artery disease)     Portal hypertension 11/23/2020    Skin cancer 11/23/2020     Past Surgical History:   Procedure Laterality Date    COLONOSCOPY  10/2020    ESOPHAGEAL MANOMETRY WITH MEASUREMENT OF IMPEDANCE N/A 7/17/2023    Procedure: MANOMETRY, ESOPHAGUS, WITH IMPEDANCE MEASUREMENT;  Surgeon: Klarissa Zamora MD;  Location: [facility] (4TH FLR);  Service: Gastroenterology;  Laterality: N/A;  pt diabetic  liver txp  prep insructions sent to pt via email and portal -Jm    ESOPHAGOGASTRODUODENOSCOPY  10/2020    ESOPHAGOGASTRODUODENOSCOPY N/A 7/1/2021    Procedure: EGD (ESOPHAGOGASTRODUODENOSCOPY);  Surgeon: Jovan David MD;  Location: [facility] (4TH FLR);  Service: Endoscopy;  Laterality: N/A;  HCC. Listed for liver transplant. EGD for variceal surveillance.  cardiac clearance and blood thinenr approval received, see telephone encounter 6/23/21-BB  fully vaccinated-BB  labs same day of procedure-BB    EXCISION OF HYDROCELE Right     hemorroid surgery      hemorroidectomy      KIDNEY STONE SURGERY      LAPAROSCOPIC APPENDECTOMY N/A 6/2/2023    Procedure: APPENDECTOMY, LAPAROSCOPIC;  Surgeon: Shan Rsos MD;  Location: [facility] OR [facility] FLR;  Service: General;  Laterality: N/A;    LEFT HEART CATHETERIZATION N/A 1/27/2021    Procedure: Left heart cath;  Surgeon: Kris Shelton MD;   Location: [facility] CATH LAB;  Service: Cardiology;  Laterality: N/A;    liver mass removal      LIVER TRANSPLANT N/A 2022    Procedure: TRANSPLANT, LIVER;  Surgeon: Lizet Garcia MD;  Location: [facility] OR [facility] FLR;  Service: Transplant;  Laterality: N/A;    RIGHT HEART CATHETERIZATION Right 2021    Procedure: INSERTION, CATHETER, RIGHT HEART;  Surgeon: Jaden Schuster MD;  Location: [facility] CATH LAB;  Service: Cardiology;  Laterality: Right;    TREATMENT OF CARDIAC ARRHYTHMIA N/A 2021    Procedure: CARDIOVERSION;  Surgeon: Didier Tilley MD;  Location: [facility] EP LAB;  Service: Cardiology;  Laterality: N/A;  a fib, dccv/johny, mac, dm. sscu    TREATMENT OF CARDIAC ARRHYTHMIA N/A 2021    Procedure: CARDIOVERSION;  Surgeon: Daniel Arambula MD;  Location: [facility] EP LAB;  Service: Cardiology;  Laterality: N/A;  afib, DCCV, anest., DM, 3 prep    TREATMENT OF CARDIAC ARRHYTHMIA N/A 2021    Procedure: Cardioversion or Defibrillation;  Surgeon: Didier Tilley MD;  Location: [facility] EP LAB;  Service: Cardiology;  Laterality: N/A;  AF, JOHNY (cancel if complaint), DCCV, MAC, DM, 3 Prep    TREATMENT OF CARDIAC ARRHYTHMIA N/A 2021    Procedure: Cardioversion or Defibrillation;  Surgeon: Didier Tilley MD;  Location: [facility] EP LAB;  Service: Cardiology;  Laterality: N/A;  AF, JOHNY (cx if complaint), DCCV, MAC, DM, 3 Prep     Family History   Problem Relation Age of Onset    Coronary artery disease Father     Colon cancer Neg Hx     Esophageal cancer Neg Hx      Social History     Tobacco Use    Smoking status: Former     Current packs/day: 0.00     Types: Cigarettes     Quit date: 1980     Years since quittin.6    Smokeless tobacco: Former   Substance Use Topics    Alcohol use: Not Currently    Drug use: Never     Review of Systems   Constitutional:  Negative for chills and fever.   HENT:  Negative for congestion, rhinorrhea and sore throat.    Eyes:  Negative for visual disturbance.   Respiratory:  Negative  for cough and shortness of breath.    Cardiovascular:  Negative for chest pain.   Gastrointestinal:  Negative for abdominal pain, nausea and vomiting.   Genitourinary:  Negative for dysuria and hematuria.   Musculoskeletal:  Negative for joint swelling.   Skin:  Negative for rash.   Neurological:  Negative for weakness.   Psychiatric/Behavioral:  Positive for dysphoric mood, sleep disturbance and suicidal ideas. Negative for confusion.    All other systems reviewed and are negative.      Physical Exam     Initial Vitals [08/23/23 0920]   BP Pulse Resp Temp SpO2   130/69 94 (!) 22 98.1 °F (36.7 °C) 100 %      MAP       --         Physical Exam    Nursing note and vitals reviewed.  Constitutional: He is not diaphoretic. He appears distressed.   Cardiovascular:  Normal rate and regular rhythm.           Pulmonary/Chest: No respiratory distress.   Abdominal: Abdomen is soft. He exhibits no distension. There is no abdominal tenderness.     Neurological: He is alert.   Psychiatric: His speech is rapid and/or pressured. He is agitated. He expresses suicidal ideation.         ED Course   Procedures  Labs Reviewed   COMPREHENSIVE METABOLIC PANEL - Abnormal; Notable for the following components:       Result Value    Glucose Level 125 (*)     Calcium Level Total 10.2 (*)     Protein Total 7.8 (*)     Globulin 3.6 (*)     All other components within normal limits   URINALYSIS, REFLEX TO URINE CULTURE - Abnormal; Notable for the following components:    Protein, UA Trace (*)     Ketones, UA 1+ (*)     All other components within normal limits   ACETAMINOPHEN LEVEL - Abnormal; Notable for the following components:    Acetaminophen Level <17.4 (*)     All other components within normal limits   SALICYLATE LEVEL - Abnormal; Notable for the following components:    Salicylate Level <5.0 (*)     All other components within normal limits   CBC WITH DIFFERENTIAL - Abnormal; Notable for the following components:    Hgb 11.7 (*)     Hct  39.4 (*)     MCV 67.2 (*)     MCH 20.0 (*)     MCHC 29.7 (*)     RDW 19.2 (*)     MPV 10.5 (*)     All other components within normal limits   BLOOD SMEAR MICROSCOPIC EXAM (OLG) - Abnormal; Notable for the following components:    RBC Morph Abnormal (*)     Anisocytosis 2+ (*)     Macrocytosis 1+ (*)     Microcytosis 1+ (*)     Polychromasia 1+ (*)     All other components within normal limits   TSH - Normal   ALCOHOL,MEDICAL (ETHANOL) - Normal   SARS-COV-2 RNA AMPLIFICATION, QUAL - Normal    Narrative:     The IDNOW COVID-19 assay is a rapid molecular in vitro diagnostic test utilizing an isothermal nucleic acid amplification technology intended for the qualitative detection of nucleic acid from the SARS-CoV-2 viral RNA in direct nasal, nasopharyngeal or throat swabs from individuals who are suspected of COVID-19 by their healthcare provider.   URINALYSIS, MICROSCOPIC - Normal   CBC W/ AUTO DIFFERENTIAL    Narrative:     The following orders were created for panel order CBC auto differential.  Procedure                               Abnormality         Status                     ---------                               -----------         ------                     CBC with Differential[[id number]]        Abnormal            Edited Result - FINAL        Please view results for these tests on the individual orders.   DRUG SCREEN, URINE (BEAKER)          Imaging Results    None          Medications   LORazepam tablet 2 mg (2 mg Oral Given 8/23/23 1041)     Medical Decision Making  Problems Addressed:  Depression with suicidal ideation: acute illness or injury that poses a threat to life or bodily functions  Medical clearance for psychiatric admission: acute illness or injury that poses a threat to life or bodily functions  Suicidal ideation: acute illness or injury that poses a threat to life or bodily functions    Risk  Prescription drug management.      ED assessment:    67-year-old with a history of liver transplant  "presenting with depression insomnia suicidal ideations  Do believe patient is very disabled danger to himself unable to seek voluntary admission  Exam as above pec filed will obtain medical clearance for psych transfer and evaluation    Differential diagnosis:   suicidal ideations, homicidal ideations, auditory or visual hallucinations, drug/etoh intoxication, bipolar disorder, schizophrenia, schizoaffective, delusional disorder, major depressive disorder, PTSD, substance induced mood disorder, violent behavior, suicidal attempt, non-psychiatric medical illness, malingering        ED management:   PEC    Amount and/or Complexity of Data Reviewed  Independent historian: spouse    Summary of history: Per wife at bedside: Pt's transplant coordinator advised her to bring him to the ED after he expressed SI to them over the phone. She states that he told them that he has felt increasingly "lowsy" since his transplant and that he might hurt himself due to it. Pt's wife states that his mood/temper has always been bad, but that it is not as bad as it has been in the past. She denies violence at home. She further states that he has to be soothed to sleep due to his anxiety, but that he definitely sleeps through the night. Pt was taken of Ambien 6 months ago. Currently on Zoloft.   External data reviewed: notes from previous admissions, notes from previous ED visits, and prescription medications   Risk and benefits of testing: discussed   Labs: ordered and reviewed    Risk  Diagnosis or treatment significantly impacted by social determinants of health: psychiatric illness    Critical Care  none    Hair YOUNGBLOOD MD personally performed the history, PE, MDM, and procedures as documented above and agree with the scribe's documentation.          Scribe Attestation:   Scribe #1: I performed the above scribed service and the documentation accurately describes the services I performed. I attest to the accuracy of the " note.    Attending Attestation:           Physician Attestation for Scribe:  Physician Attestation Statement for Scribe #1: Hair YOUNGBLOOD IV, MD, reviewed documentation, as scribed by Lizz Mckinley in my presence, and it is both accurate and complete.             ED Course as of 08/23/23 1147   Wed Aug 23, 2023   1131 Medically cleared for psych evaluation    [AC]      ED Course User Index  [AC] Hair Mock IV, MD       Medically cleared for psychiatry placement: 8/23/2023 11:30 AM            Clinical Impression:   Final diagnoses:  [R45.851] Suicidal ideation (Primary)  [F32.A, R45.851] Depression with suicidal ideation  [Z00.8] Medical clearance for psychiatric admission        ED Disposition Condition    Transfer to Psych Facility Stable          ED Prescriptions    None       Follow-up Information    None          Hair Mock IV, MD  08/23/23 1143

## 2023-08-23 NOTE — FIRST PROVIDER EVALUATION
Medical screening examination initiated.  I have conducted a focused provider triage encounter, findings are as follows:    Brief history of present illness:  68 yo male presents to ED for evaluation of insomnia, anxiety and suicidal ideation. Patient states feels like jumping out of his skin. Patient reports history of liver transplant.     There were no vitals filed for this visit.    Pertinent physical exam:  Patient is awake and alert and oriented.  Ambulatory to triage.  In no acute distress.      Brief workup plan:  psychiatric evaluation    Preliminary workup initiated; this workup will be continued and followed by the physician or advanced practice provider that is assigned to the patient when roomed.

## 2023-08-23 NOTE — TELEPHONE ENCOUNTER
Call to S.O after received notification from coord that he has suicidal ideation. Indicated pt needs to go to ER. If he refuses then would call 911. Coordinator spoke with pt directly and told him to go to ER as well.

## 2023-08-23 NOTE — ED NOTES
"Assumed care of pt. At this time.. pt. Arrives via POV C/o difficulty sleeping and "coming out of my head". Pt. Very anxious upon arrival and requesting something to put him to sleep and has only been able to sleep for 1 hour at a time.. Wife reports that patient has been sleeping more than he is stating.. Pt. Denies any psychiatric dx or medications.. when asked about medications he states "they should all be in your system".. denies any changes to medications, wife reports pt. Is on a lot of medications. Reports hx liver transplant last year.  Pt. Is dressed in what looks like pajamas and hair is not fixed upon arrival. Noted darting eye contact. Pt. Anxious appearing and agitation noted. Noted steady gait, with hyperactive movements.. affect Lively and inappropriate to situation. Pt. Angry during nursing encounter and "would like to be put to sleep" noted moderate anxiety level. Pressured Speech rapid, loud, relevant, and lively. Noted impulsive behavior continuously requesting for medication to be put to sleep.   "

## 2023-08-28 ENCOUNTER — TELEPHONE (OUTPATIENT)
Dept: ENDOSCOPY | Facility: HOSPITAL | Age: 68
End: 2023-08-28
Payer: MEDICARE

## 2023-08-28 ENCOUNTER — TELEPHONE (OUTPATIENT)
Dept: TRANSPLANT | Facility: CLINIC | Age: 68
End: 2023-08-28
Payer: MEDICARE

## 2023-08-28 NOTE — TELEPHONE ENCOUNTER
Dear Dr Tilley,     Patient has a scheduled procedure Upper Endoscopy (EGD) on 9/28/23 and is currently taking a blood thinner prescribed by your office. In order to ensure patient safety, we would like to confirm that the patient can place their blood thinner medication on hold for the procedure. Can he/she discontinue Eliquis (apixaban) for a minimum of 2 days prior to the procedure?     Thank you for your prompt reply.     [facility] Endoscopy Scheduling

## 2023-08-28 NOTE — TELEPHONE ENCOUNTER
----- Message from Justice Trejo MA sent at 2023  2:10 PM CDT -----  Regardin/28 BT  The patient is currently under an internal cardiologist Alexi guajardo and requires a blood thinner Eliquis (apixaban) for their upcoming scheduled Upper Endoscopy (EGD) on 23.     Notes: Pt has a missed cardiology appointment from 3/2023

## 2023-08-29 ENCOUNTER — TELEPHONE (OUTPATIENT)
Dept: OPHTHALMOLOGY | Facility: CLINIC | Age: 68
End: 2023-08-29
Payer: MEDICARE

## 2023-08-29 ENCOUNTER — TELEPHONE (OUTPATIENT)
Dept: TRANSPLANT | Facility: CLINIC | Age: 68
End: 2023-08-29
Payer: MEDICARE

## 2023-08-29 NOTE — TELEPHONE ENCOUNTER
Called Meg to get update on patient he was discharged today.  He will be following up with  Psychiatry while on wait list to see Psychiatry at [facility].  He also has an appointment with a Therapist.  He was given 2 months of medication.  He will have a follow up visit with us and needs a follow up with PCP.    ----- Message from Ronel Cast MA sent at 8/29/2023 11:48 AM CDT -----  Regarding: FW: Appointment    ----- Message -----  From: Jes Ken  Sent: 8/29/2023  10:52 AM CDT  To: [facility] Post-Liver Transplant Non-Clinical  Subject: Appointment                                          Name Of Caller:    Meg Martinez      Contact Preference:    [phone]       Nature of call:    Returning call to office. She wants to schedule the pt for a hospital f/u.

## 2023-08-29 NOTE — TELEPHONE ENCOUNTER
----- Message from Jes Ken sent at 8/29/2023 10:48 AM CDT -----  Regarding: Clinical Notes      Name Of Caller:     Ms. Rachel hightower/ [facility]      Contact Preference:     Phone #: [phone], Fax #: [phone]      Nature of call:    Requesting clinical notes for the pt's visit on 06/07/2023.

## 2023-09-01 ENCOUNTER — PATIENT MESSAGE (OUTPATIENT)
Dept: TRANSPLANT | Facility: CLINIC | Age: 68
End: 2023-09-01
Payer: MEDICARE

## 2023-09-06 ENCOUNTER — LAB VISIT (OUTPATIENT)
Dept: LAB | Facility: HOSPITAL | Age: 68
End: 2023-09-06
Attending: INTERNAL MEDICINE
Payer: MEDICARE

## 2023-09-06 ENCOUNTER — PATIENT MESSAGE (OUTPATIENT)
Dept: TRANSPLANT | Facility: CLINIC | Age: 68
End: 2023-09-06
Payer: MEDICARE

## 2023-09-06 DIAGNOSIS — Z94.4 S/P LIVER TRANSPLANT: ICD-10-CM

## 2023-09-06 DIAGNOSIS — E83.42 HYPOMAGNESEMIA: ICD-10-CM

## 2023-09-06 DIAGNOSIS — C22.1 CHOLANGIOCARCINOMA: ICD-10-CM

## 2023-09-06 DIAGNOSIS — R97.0 ELEVATED CARCINOEMBRYONIC ANTIGEN (CEA): ICD-10-CM

## 2023-09-06 LAB
ALBUMIN SERPL BCP-MCNC: 3.4 G/DL (ref 3.5–5.2)
ALP SERPL-CCNC: 87 U/L (ref 55–135)
ALT SERPL W/O P-5'-P-CCNC: 22 U/L (ref 10–44)
ANION GAP SERPL CALC-SCNC: 10 MMOL/L (ref 8–16)
ANISOCYTOSIS BLD QL SMEAR: SLIGHT
AST SERPL-CCNC: 26 U/L (ref 10–40)
BASOPHILS # BLD AUTO: 0.05 K/UL (ref 0–0.2)
BASOPHILS NFR BLD: 1 % (ref 0–1.9)
BILIRUB SERPL-MCNC: 0.3 MG/DL (ref 0.1–1)
BUN SERPL-MCNC: 16 MG/DL (ref 8–23)
CALCIUM SERPL-MCNC: 8.6 MG/DL (ref 8.7–10.5)
CANCER AG19-9 SERPL-ACNC: <2.1 U/ML (ref 0–40)
CEA SERPL-MCNC: <1.7 NG/ML (ref 0–5)
CHLORIDE SERPL-SCNC: 104 MMOL/L (ref 95–110)
CO2 SERPL-SCNC: 25 MMOL/L (ref 23–29)
CREAT SERPL-MCNC: 1 MG/DL (ref 0.5–1.4)
DIFFERENTIAL METHOD: ABNORMAL
EOSINOPHIL # BLD AUTO: 0.2 K/UL (ref 0–0.5)
EOSINOPHIL NFR BLD: 3.4 % (ref 0–8)
ERYTHROCYTE [DISTWIDTH] IN BLOOD BY AUTOMATED COUNT: 19 % (ref 11.5–14.5)
EST. GFR  (NO RACE VARIABLE): >60 ML/MIN/1.73 M^2
GLUCOSE SERPL-MCNC: 139 MG/DL (ref 70–110)
HCT VFR BLD AUTO: 33.2 % (ref 40–54)
HGB BLD-MCNC: 9.5 G/DL (ref 14–18)
IMM GRANULOCYTES # BLD AUTO: 0.03 K/UL (ref 0–0.04)
IMM GRANULOCYTES NFR BLD AUTO: 0.6 % (ref 0–0.5)
LYMPHOCYTES # BLD AUTO: 0.9 K/UL (ref 1–4.8)
LYMPHOCYTES NFR BLD: 18.2 % (ref 18–48)
MAGNESIUM SERPL-MCNC: 1.8 MG/DL (ref 1.6–2.6)
MCH RBC QN AUTO: 21 PG (ref 27–31)
MCHC RBC AUTO-ENTMCNC: 28.6 G/DL (ref 32–36)
MCV RBC AUTO: 74 FL (ref 82–98)
MONOCYTES # BLD AUTO: 0.5 K/UL (ref 0.3–1)
MONOCYTES NFR BLD: 10.5 % (ref 4–15)
NEUTROPHILS # BLD AUTO: 3.3 K/UL (ref 1.8–7.7)
NEUTROPHILS NFR BLD: 66.3 % (ref 38–73)
NRBC BLD-RTO: 0 /100 WBC
OVALOCYTES BLD QL SMEAR: ABNORMAL
PLATELET # BLD AUTO: 146 K/UL (ref 150–450)
PLATELET BLD QL SMEAR: ABNORMAL
PMV BLD AUTO: 10.9 FL (ref 9.2–12.9)
POIKILOCYTOSIS BLD QL SMEAR: SLIGHT
POTASSIUM SERPL-SCNC: 4.5 MMOL/L (ref 3.5–5.1)
PROT SERPL-MCNC: 6.9 G/DL (ref 6–8.4)
RBC # BLD AUTO: 4.52 M/UL (ref 4.6–6.2)
SODIUM SERPL-SCNC: 139 MMOL/L (ref 136–145)
WBC # BLD AUTO: 4.95 K/UL (ref 3.9–12.7)

## 2023-09-06 PROCEDURE — 83735 ASSAY OF MAGNESIUM: CPT | Performed by: INTERNAL MEDICINE

## 2023-09-06 PROCEDURE — 85025 COMPLETE CBC W/AUTO DIFF WBC: CPT | Performed by: INTERNAL MEDICINE

## 2023-09-06 PROCEDURE — 80197 ASSAY OF TACROLIMUS: CPT | Performed by: INTERNAL MEDICINE

## 2023-09-06 PROCEDURE — 36415 COLL VENOUS BLD VENIPUNCTURE: CPT | Mod: PN | Performed by: INTERNAL MEDICINE

## 2023-09-06 PROCEDURE — 82378 CARCINOEMBRYONIC ANTIGEN: CPT | Performed by: INTERNAL MEDICINE

## 2023-09-06 PROCEDURE — 86301 IMMUNOASSAY TUMOR CA 19-9: CPT | Performed by: INTERNAL MEDICINE

## 2023-09-06 PROCEDURE — 80053 COMPREHEN METABOLIC PANEL: CPT | Performed by: INTERNAL MEDICINE

## 2023-09-07 ENCOUNTER — PATIENT MESSAGE (OUTPATIENT)
Dept: TRANSPLANT | Facility: CLINIC | Age: 68
End: 2023-09-07
Payer: MEDICARE

## 2023-09-07 DIAGNOSIS — Z94.4 S/P LIVER TRANSPLANT: ICD-10-CM

## 2023-09-07 DIAGNOSIS — C22.1 CHOLANGIOCARCINOMA: Primary | ICD-10-CM

## 2023-09-07 LAB — TACROLIMUS BLD-MCNC: 4.3 NG/ML (ref 5–15)

## 2023-09-07 RX ORDER — TACROLIMUS 0.5 MG/1
CAPSULE ORAL
Qty: 90 CAPSULE | Refills: 11 | Status: SHIPPED | OUTPATIENT
Start: 2023-09-07 | End: 2023-09-13 | Stop reason: SDUPTHER

## 2023-09-07 NOTE — TELEPHONE ENCOUNTER
Sent message to patient with change and to get labs on 10/02/23    ----- Message from Thais Maxwell MD sent at 9/7/2023  9:46 AM CDT -----  The Labs are stable; increase prograf to 1/o.5 from 0.5/0.5 and repeat labs in one month - please let patient know.

## 2023-09-13 DIAGNOSIS — Z94.4 S/P LIVER TRANSPLANT: ICD-10-CM

## 2023-09-14 RX ORDER — TACROLIMUS 0.5 MG/1
CAPSULE ORAL
Qty: 90 CAPSULE | Refills: 11 | Status: SHIPPED | OUTPATIENT
Start: 2023-09-14 | End: 2023-10-03

## 2023-09-18 ENCOUNTER — HOSPITAL ENCOUNTER (OUTPATIENT)
Dept: RADIOLOGY | Facility: HOSPITAL | Age: 68
Discharge: HOME | End: 2023-09-18
Attending: INTERNAL MEDICINE
Payer: MEDICARE

## 2023-09-18 DIAGNOSIS — C22.1 CHOLANGIOCARCINOMA: ICD-10-CM

## 2023-09-18 PROCEDURE — 71250 CT THORAX DX C-: CPT | Mod: TC

## 2023-09-18 PROCEDURE — 71250 CT THORAX DX C-: CPT | Mod: 26,,, | Performed by: RADIOLOGY

## 2023-09-18 PROCEDURE — 74160 CT ABDOMEN WITH CONTRAST: ICD-10-PCS | Mod: 26,,, | Performed by: RADIOLOGY

## 2023-09-18 PROCEDURE — 25500020 PHARM REV CODE 255: Performed by: INTERNAL MEDICINE

## 2023-09-18 PROCEDURE — 71250 CT CHEST WITHOUT CONTRAST: ICD-10-PCS | Mod: 26,,, | Performed by: RADIOLOGY

## 2023-09-18 PROCEDURE — 74160 CT ABDOMEN W/CONTRAST: CPT | Mod: TC

## 2023-09-18 PROCEDURE — 74160 CT ABDOMEN W/CONTRAST: CPT | Mod: 26,,, | Performed by: RADIOLOGY

## 2023-09-18 RX ADMIN — IOHEXOL 75 ML: 350 INJECTION, SOLUTION INTRAVENOUS at 03:09

## 2023-09-20 ENCOUNTER — TELEPHONE (OUTPATIENT)
Dept: TRANSPLANT | Facility: CLINIC | Age: 68
End: 2023-09-20
Payer: MEDICARE

## 2023-09-20 ENCOUNTER — PATIENT MESSAGE (OUTPATIENT)
Dept: TRANSPLANT | Facility: CLINIC | Age: 68
End: 2023-09-20
Payer: MEDICARE

## 2023-09-20 ENCOUNTER — OFFICE VISIT (OUTPATIENT)
Dept: TRANSPLANT | Facility: CLINIC | Age: 68
End: 2023-09-20
Payer: MEDICARE

## 2023-09-20 VITALS
DIASTOLIC BLOOD PRESSURE: 83 MMHG | WEIGHT: 213.5 LBS | OXYGEN SATURATION: 98 % | TEMPERATURE: 98 F | HEART RATE: 69 BPM | RESPIRATION RATE: 18 BRPM | SYSTOLIC BLOOD PRESSURE: 188 MMHG | HEIGHT: 72 IN | BODY MASS INDEX: 28.92 KG/M2

## 2023-09-20 DIAGNOSIS — F32.A DEPRESSION, UNSPECIFIED DEPRESSION TYPE: ICD-10-CM

## 2023-09-20 DIAGNOSIS — C44.90 SKIN CANCER: ICD-10-CM

## 2023-09-20 DIAGNOSIS — Z91.89 AT RISK FOR OPPORTUNISTIC INFECTIONS: ICD-10-CM

## 2023-09-20 DIAGNOSIS — R18.8 OTHER ASCITES: ICD-10-CM

## 2023-09-20 DIAGNOSIS — C22.1 CHOLANGIOCARCINOMA: ICD-10-CM

## 2023-09-20 DIAGNOSIS — Z29.89 PROPHYLACTIC IMMUNOTHERAPY: Primary | ICD-10-CM

## 2023-09-20 DIAGNOSIS — Z94.4 S/P LIVER TRANSPLANT: ICD-10-CM

## 2023-09-20 DIAGNOSIS — Z79.60 LONG-TERM USE OF IMMUNOSUPPRESSANT MEDICATION: ICD-10-CM

## 2023-09-20 PROCEDURE — 99214 OFFICE O/P EST MOD 30 MIN: CPT | Mod: S$GLB,,, | Performed by: INTERNAL MEDICINE

## 2023-09-20 PROCEDURE — 3079F PR MOST RECENT DIASTOLIC BLOOD PRESSURE 80-89 MM HG: ICD-10-PCS | Mod: CPTII,S$GLB,, | Performed by: INTERNAL MEDICINE

## 2023-09-20 PROCEDURE — 99999 PR PBB SHADOW E&M-EST. PATIENT-LVL V: CPT | Mod: PBBFAC,,, | Performed by: INTERNAL MEDICINE

## 2023-09-20 PROCEDURE — 1126F PR PAIN SEVERITY QUANTIFIED, NO PAIN PRESENT: ICD-10-PCS | Mod: CPTII,S$GLB,, | Performed by: INTERNAL MEDICINE

## 2023-09-20 PROCEDURE — 1101F PT FALLS ASSESS-DOCD LE1/YR: CPT | Mod: CPTII,S$GLB,, | Performed by: INTERNAL MEDICINE

## 2023-09-20 PROCEDURE — 1160F RVW MEDS BY RX/DR IN RCRD: CPT | Mod: CPTII,S$GLB,, | Performed by: INTERNAL MEDICINE

## 2023-09-20 PROCEDURE — 3288F FALL RISK ASSESSMENT DOCD: CPT | Mod: CPTII,S$GLB,, | Performed by: INTERNAL MEDICINE

## 2023-09-20 PROCEDURE — 3008F BODY MASS INDEX DOCD: CPT | Mod: CPTII,S$GLB,, | Performed by: INTERNAL MEDICINE

## 2023-09-20 PROCEDURE — 3044F HG A1C LEVEL LT 7.0%: CPT | Mod: CPTII,S$GLB,, | Performed by: INTERNAL MEDICINE

## 2023-09-20 PROCEDURE — 1159F PR MEDICATION LIST DOCUMENTED IN MEDICAL RECORD: ICD-10-PCS | Mod: CPTII,S$GLB,, | Performed by: INTERNAL MEDICINE

## 2023-09-20 PROCEDURE — 1126F AMNT PAIN NOTED NONE PRSNT: CPT | Mod: CPTII,S$GLB,, | Performed by: INTERNAL MEDICINE

## 2023-09-20 PROCEDURE — 1159F MED LIST DOCD IN RCRD: CPT | Mod: CPTII,S$GLB,, | Performed by: INTERNAL MEDICINE

## 2023-09-20 PROCEDURE — 99214 PR OFFICE/OUTPT VISIT, EST, LEVL IV, 30-39 MIN: ICD-10-PCS | Mod: S$GLB,,, | Performed by: INTERNAL MEDICINE

## 2023-09-20 PROCEDURE — 3288F PR FALLS RISK ASSESSMENT DOCUMENTED: ICD-10-PCS | Mod: CPTII,S$GLB,, | Performed by: INTERNAL MEDICINE

## 2023-09-20 PROCEDURE — 1160F PR REVIEW ALL MEDS BY PRESCRIBER/CLIN PHARMACIST DOCUMENTED: ICD-10-PCS | Mod: CPTII,S$GLB,, | Performed by: INTERNAL MEDICINE

## 2023-09-20 PROCEDURE — 3077F PR MOST RECENT SYSTOLIC BLOOD PRESSURE >= 140 MM HG: ICD-10-PCS | Mod: CPTII,S$GLB,, | Performed by: INTERNAL MEDICINE

## 2023-09-20 PROCEDURE — 1101F PR PT FALLS ASSESS DOC 0-1 FALLS W/OUT INJ PAST YR: ICD-10-PCS | Mod: CPTII,S$GLB,, | Performed by: INTERNAL MEDICINE

## 2023-09-20 PROCEDURE — 3008F PR BODY MASS INDEX (BMI) DOCUMENTED: ICD-10-PCS | Mod: CPTII,S$GLB,, | Performed by: INTERNAL MEDICINE

## 2023-09-20 PROCEDURE — 99999 PR PBB SHADOW E&M-EST. PATIENT-LVL V: ICD-10-PCS | Mod: PBBFAC,,, | Performed by: INTERNAL MEDICINE

## 2023-09-20 PROCEDURE — 3044F PR MOST RECENT HEMOGLOBIN A1C LEVEL <7.0%: ICD-10-PCS | Mod: CPTII,S$GLB,, | Performed by: INTERNAL MEDICINE

## 2023-09-20 PROCEDURE — 3077F SYST BP >= 140 MM HG: CPT | Mod: CPTII,S$GLB,, | Performed by: INTERNAL MEDICINE

## 2023-09-20 PROCEDURE — 3079F DIAST BP 80-89 MM HG: CPT | Mod: CPTII,S$GLB,, | Performed by: INTERNAL MEDICINE

## 2023-09-20 RX ORDER — QUETIAPINE FUMARATE 100 MG/1
100 TABLET, FILM COATED ORAL NIGHTLY
COMMUNITY
Start: 2023-08-29 | End: 2023-09-20 | Stop reason: SDUPTHER

## 2023-09-20 RX ORDER — BUPROPION HYDROCHLORIDE 150 MG/1
150 TABLET ORAL EVERY MORNING
Qty: 30 TABLET | Refills: 2 | Status: SHIPPED | OUTPATIENT
Start: 2023-09-20 | End: 2023-09-21 | Stop reason: SDUPTHER

## 2023-09-20 RX ORDER — FUROSEMIDE 20 MG/1
20 TABLET ORAL DAILY
Qty: 30 TABLET | Refills: 2 | Status: ON HOLD | OUTPATIENT
Start: 2023-09-20 | End: 2023-12-23

## 2023-09-20 RX ORDER — QUETIAPINE FUMARATE 100 MG/1
100 TABLET, FILM COATED ORAL NIGHTLY
Qty: 30 TABLET | Refills: 2 | Status: SHIPPED | OUTPATIENT
Start: 2023-09-20 | End: 2023-09-21 | Stop reason: SDUPTHER

## 2023-09-20 RX ORDER — BUPROPION HYDROCHLORIDE 150 MG/1
150 TABLET ORAL EVERY MORNING
COMMUNITY
Start: 2023-08-29 | End: 2023-09-20 | Stop reason: SDUPTHER

## 2023-09-20 NOTE — TELEPHONE ENCOUNTER
Sent message to patient about both scans not showing any signs of cancer.    ----- Message from Thais Maxwell MD sent at 9/19/2023 11:53 AM CDT -----  Ct is stable. No obvious cancer

## 2023-09-20 NOTE — PROGRESS NOTES
Transplant Hepatology  Liver Transplant Recipient Follow-up    Transplant Date: 1/6/2022  UNOS Native Liver Dx: Primary Liver Malignancy: Hepatoma (HCC) (final path revealed cholangiocarcinoma) and Cirrhosis    Tej is here for follow up of his liver transplant.    ORGAN: LIVER  Whole or Partial: whole liver  Donor Type: donation after brain death  CDC High Risk: no  Donor CMV Status: Positive  Donor HCV Status: Negative  Donor HBcAb: Negative  Donor HBV JAYLIN:   Donor HCV JYALIN: Negative  Biliary Anastomosis: end to end  Arterial Anatomy: replaced left hepatic from left gastric  IVC reconstruction: end to end ivc  Portal vein status: patent    EXPLANT: 7 cm tumor cholangiocarcinoma, necrotic, no vascular invasion, cirrhosis, 1 lymph node negative    He has had the following complications since transplant: none.     Subjective:     Interval History: Tej is now 1 year and 8 months post liver transplant. Currently, he recently was admitted for suicidal ideation. He is now improved on zoloft/wellbutrin/seroquel.     Allograft function 9/6/23: ALT 22, AST 26, ALKP 87, Tbil 0.3, creat 1.0, prograf 4.3, CA 19-9 <2.1  IS: prograf   Immunoprophylaxis:  Aspirin: YES/NO(WHY) DOSE: 81 mg   Apixaban for AFIB    Abdo US doppler 1/25/23: satisfactory    Hx cholangiocarcinoma: saw oncology: adjuvant chemo deferred   Ct abdo w contrast 9/18/23: no obvious recurrence of cholangio;  Ct chest wo contrast 9/18/23: no metastases    Health maintenance  --colonoscopy-repeat 2023  --bone density 3/14/23: Osteopenia; Daily calcium intake 0883-1529 mg, dietary sources preferred; Vitamin D 0829-5257 IU daily; Weight bearing exercise and fall precautions; Repeat BMD in 2 years; repeat bone density 3/25  Dermatology: ND    Review of Systems   HENT: Negative.     Eyes: Negative.    Respiratory: Negative.     Cardiovascular: Negative.    Genitourinary: Negative.    Musculoskeletal: Negative.    Skin: Negative.    Psychiatric/Behavioral:  Negative.         Objective:     Vitals:    09/20/23 1549   BP: (!) 188/83   Pulse: 69   Resp: 18   Temp: 98 °F (36.7 °C)       Physical Exam  Constitutional:       Appearance: Normal appearance.   Eyes:      General: No scleral icterus.  Cardiovascular:      Rate and Rhythm: Normal rate and regular rhythm.   Pulmonary:      Effort: Pulmonary effort is normal.   Abdominal:      General: Abdomen is flat. There is no distension.      Palpations: Abdomen is soft. There is no mass.      Tenderness: There is no abdominal tenderness.   Musculoskeletal:         General: No swelling.   Skin:     General: Skin is warm and dry.   Neurological:      Mental Status: He is alert and oriented to person, place, and time.   Psychiatric:         Mood and Affect: Mood normal.            Lab Results   Component Value Date    BILITOT 0.3 09/06/2023    AST 26 09/06/2023    ALT 22 09/06/2023    ALKPHOS 87 09/06/2023    CREATININE 1.0 09/06/2023    ALBUMIN 3.4 (L) 09/06/2023     Lab Results   Component Value Date    WBC 4.95 09/06/2023    HGB 9.5 (L) 09/06/2023    HCT 33.2 (L) 09/06/2023    HCT 33 (L) 01/06/2022     (L) 09/06/2023     Lab Results   Component Value Date    TACROLIMUS 4.3 (L) 09/06/2023       Assessment/Plan:   The patient is a 67 year old male who is now 1 year and 8 months post liver transplant. He has good allograft function. Recent severe depression-now improved. Current recommendations:  1. S/p liver transplant and control of IS: good allograft function, prograf target 4-6;  2. Prophylaxis:  Aspirin: YES/NO(WHY) DOSE: 81 mg   *Apixaban for AFIB*  3. Cholangiocarcinoma: chemotx deferred; surveillance imaging and cea/ca19-9 every 3 months  4. Ascites: resolved- continue off lasix  5.  Health maintenance: the patient should see a dermatologist annually to screen for skin cancer, perform regular colonoscopies (due  this year - 2023)  6. R/O osteoporosis. I am recommending a repeat bone density to r/o osteoporosis in  2025  7. Referral to neuropsych; to see psych as scheduled   8. F/u with cardiology re changing amiodarone to alt med to see if N/V improve/resolve; discuss cholesterol med with cardiologist    Return 3 months    MD BRANDIN Menon Patient Status  Functional Status: 70% - Cares for self: unable to carry on normal activity or active work  Physical Capacity: No Limitations    New diabetes onset between last follow-up to the current follow-up: No  Did patient have any acute rejection episodes during the follow-up period: No  Post transplant malignancy: No

## 2023-09-20 NOTE — LETTER
September 20, 2023        Jessika Carbone  [address]  [facility] [address]  Phone: [phone]  Fax: [phone]             [facility]  [address] [address]  Phone: [phone]  Fax: [phone]   Patient: Tej Purdy   MR Number: [id number]   YOB: 1955   Date of Visit: 9/20/2023       Dear Dr. Jessika Carbone    Thank you for referring Tej Purdy to me for evaluation. Attached you will find relevant portions of my assessment and plan of care.    If you have questions, please do not hesitate to call me. I look forward to following Tej Purdy along with you.    Sincerely,    Thais Maxwell MD    Enclosure    If you would like to receive this communication electronically, please contact [email or web address] or [phone] to request [employer] Link access.    [employer] Link is a tool which provides read-only access to select patient information with whom you have a relationship. Its easy to use and provides real time access to review your patients record including encounter summaries, notes, results, and demographic information.    If you feel you have received this communication in error or would no longer like to receive these types of communications, please e-mail [email or web address]

## 2023-09-21 ENCOUNTER — PATIENT MESSAGE (OUTPATIENT)
Dept: TRANSPLANT | Facility: CLINIC | Age: 68
End: 2023-09-21
Payer: MEDICARE

## 2023-09-21 DIAGNOSIS — F32.A DEPRESSION, UNSPECIFIED DEPRESSION TYPE: ICD-10-CM

## 2023-09-21 RX ORDER — BUPROPION HYDROCHLORIDE 150 MG/1
150 TABLET ORAL EVERY MORNING
Qty: 30 TABLET | Refills: 2 | Status: SHIPPED | OUTPATIENT
Start: 2023-09-21 | End: 2023-09-21 | Stop reason: SDUPTHER

## 2023-09-21 RX ORDER — QUETIAPINE FUMARATE 100 MG/1
100 TABLET, FILM COATED ORAL NIGHTLY
Qty: 30 TABLET | Refills: 2 | Status: SHIPPED | OUTPATIENT
Start: 2023-09-21 | End: 2023-09-21 | Stop reason: SDUPTHER

## 2023-09-21 RX ORDER — QUETIAPINE FUMARATE 100 MG/1
100 TABLET, FILM COATED ORAL NIGHTLY
Qty: 30 TABLET | Refills: 2 | Status: SHIPPED | OUTPATIENT
Start: 2023-09-21 | End: 2023-12-10 | Stop reason: SDUPTHER

## 2023-09-21 RX ORDER — BUPROPION HYDROCHLORIDE 150 MG/1
150 TABLET ORAL EVERY MORNING
Qty: 30 TABLET | Refills: 2 | Status: ON HOLD | OUTPATIENT
Start: 2023-09-21 | End: 2023-12-21 | Stop reason: SDUPTHER

## 2023-09-22 ENCOUNTER — PATIENT MESSAGE (OUTPATIENT)
Dept: TRANSPLANT | Facility: CLINIC | Age: 68
End: 2023-09-22
Payer: MEDICARE

## 2023-09-22 DIAGNOSIS — Z12.83 SCREENING FOR SKIN CANCER: Primary | ICD-10-CM

## 2023-09-27 ENCOUNTER — TELEPHONE (OUTPATIENT)
Dept: ENDOSCOPY | Facility: HOSPITAL | Age: 68
End: 2023-09-27
Payer: MEDICARE

## 2023-09-27 ENCOUNTER — TELEPHONE (OUTPATIENT)
Dept: DERMATOLOGY | Facility: CLINIC | Age: 68
End: 2023-09-27
Payer: MEDICARE

## 2023-09-27 NOTE — TELEPHONE ENCOUNTER
----- Message from Maria C Barraza sent at 9/27/2023  3:39 PM CDT -----  Regarding: FW: Procedure  Contact: Pt [phone]    ----- Message -----  From: Hortencia Hanson RN  Sent: 9/27/2023   1:07 PM CDT  To: [facility] Endo Schedulers  Subject: FW: Procedure                                      ----- Message -----  From: Frieda Red  Sent: 9/27/2023  12:00 PM CDT  To: [facility] Staff  Subject: Procedure                                        Pt is calling to cancel appt please call

## 2023-10-02 ENCOUNTER — LAB VISIT (OUTPATIENT)
Dept: LAB | Facility: HOSPITAL | Age: 68
End: 2023-10-02
Attending: INTERNAL MEDICINE
Payer: MEDICARE

## 2023-10-02 DIAGNOSIS — Z94.4 S/P LIVER TRANSPLANT: ICD-10-CM

## 2023-10-02 LAB
ALBUMIN SERPL BCP-MCNC: 3.9 G/DL (ref 3.5–5.2)
ALP SERPL-CCNC: 74 U/L (ref 55–135)
ALT SERPL W/O P-5'-P-CCNC: 23 U/L (ref 10–44)
ANION GAP SERPL CALC-SCNC: 9 MMOL/L (ref 8–16)
AST SERPL-CCNC: 29 U/L (ref 10–40)
BASOPHILS # BLD AUTO: 0.08 K/UL (ref 0–0.2)
BASOPHILS NFR BLD: 1.5 % (ref 0–1.9)
BILIRUB SERPL-MCNC: 0.6 MG/DL (ref 0.1–1)
BUN SERPL-MCNC: 11 MG/DL (ref 8–23)
CALCIUM SERPL-MCNC: 9.1 MG/DL (ref 8.7–10.5)
CHLORIDE SERPL-SCNC: 102 MMOL/L (ref 95–110)
CO2 SERPL-SCNC: 26 MMOL/L (ref 23–29)
CREAT SERPL-MCNC: 0.9 MG/DL (ref 0.5–1.4)
DIFFERENTIAL METHOD: ABNORMAL
EOSINOPHIL # BLD AUTO: 0.2 K/UL (ref 0–0.5)
EOSINOPHIL NFR BLD: 3.7 % (ref 0–8)
ERYTHROCYTE [DISTWIDTH] IN BLOOD BY AUTOMATED COUNT: 19.4 % (ref 11.5–14.5)
EST. GFR  (NO RACE VARIABLE): >60 ML/MIN/1.73 M^2
GLUCOSE SERPL-MCNC: 137 MG/DL (ref 70–110)
HCT VFR BLD AUTO: 38.5 % (ref 40–54)
HGB BLD-MCNC: 10.5 G/DL (ref 14–18)
IMM GRANULOCYTES # BLD AUTO: 0.03 K/UL (ref 0–0.04)
IMM GRANULOCYTES NFR BLD AUTO: 0.6 % (ref 0–0.5)
LYMPHOCYTES # BLD AUTO: 0.8 K/UL (ref 1–4.8)
LYMPHOCYTES NFR BLD: 15.3 % (ref 18–48)
MCH RBC QN AUTO: 19.3 PG (ref 27–31)
MCHC RBC AUTO-ENTMCNC: 27.3 G/DL (ref 32–36)
MCV RBC AUTO: 71 FL (ref 82–98)
MONOCYTES # BLD AUTO: 0.8 K/UL (ref 0.3–1)
MONOCYTES NFR BLD: 15.1 % (ref 4–15)
NEUTROPHILS # BLD AUTO: 3.4 K/UL (ref 1.8–7.7)
NEUTROPHILS NFR BLD: 63.8 % (ref 38–73)
NRBC BLD-RTO: 0 /100 WBC
PLATELET # BLD AUTO: 142 K/UL (ref 150–450)
PMV BLD AUTO: 10.2 FL (ref 9.2–12.9)
POTASSIUM SERPL-SCNC: 4.3 MMOL/L (ref 3.5–5.1)
PROT SERPL-MCNC: 7.6 G/DL (ref 6–8.4)
RBC # BLD AUTO: 5.43 M/UL (ref 4.6–6.2)
SODIUM SERPL-SCNC: 137 MMOL/L (ref 136–145)
TACROLIMUS BLD-MCNC: 3.1 NG/ML (ref 5–15)
WBC # BLD AUTO: 5.37 K/UL (ref 3.9–12.7)

## 2023-10-02 PROCEDURE — 80053 COMPREHEN METABOLIC PANEL: CPT | Performed by: INTERNAL MEDICINE

## 2023-10-02 PROCEDURE — 36415 COLL VENOUS BLD VENIPUNCTURE: CPT | Mod: PN | Performed by: INTERNAL MEDICINE

## 2023-10-02 PROCEDURE — 85025 COMPLETE CBC W/AUTO DIFF WBC: CPT | Performed by: INTERNAL MEDICINE

## 2023-10-02 PROCEDURE — 80197 ASSAY OF TACROLIMUS: CPT | Performed by: INTERNAL MEDICINE

## 2023-10-03 ENCOUNTER — PATIENT MESSAGE (OUTPATIENT)
Dept: TRANSPLANT | Facility: CLINIC | Age: 68
End: 2023-10-03
Payer: MEDICARE

## 2023-10-03 DIAGNOSIS — Z94.4 S/P LIVER TRANSPLANT: ICD-10-CM

## 2023-10-03 RX ORDER — TACROLIMUS 0.5 MG/1
1 CAPSULE ORAL EVERY 12 HOURS
Qty: 120 CAPSULE | Refills: 11 | Status: SHIPPED | OUTPATIENT
Start: 2023-10-03 | End: 2024-01-04

## 2023-10-03 NOTE — TELEPHONE ENCOUNTER
Sent message to patient with change and to get labs on 10/16/23    ----- Message from Thais Maxwell MD sent at 10/2/2023  9:31 PM CDT -----  The Labs are stable; increase prograf to 1 mg bid from 1/0.5 and repeat labs in 2 weeks - please let patient know.

## 2023-10-11 ENCOUNTER — HOSPITAL ENCOUNTER (EMERGENCY)
Facility: HOSPITAL | Age: 68
Discharge: HOME | End: 2023-10-11
Attending: EMERGENCY MEDICINE
Payer: MEDICARE

## 2023-10-11 ENCOUNTER — TELEPHONE (OUTPATIENT)
Dept: ORTHOPEDICS | Facility: CLINIC | Age: 68
End: 2023-10-11
Payer: MEDICARE

## 2023-10-11 VITALS
RESPIRATION RATE: 16 BRPM | SYSTOLIC BLOOD PRESSURE: 152 MMHG | HEART RATE: 65 BPM | BODY MASS INDEX: 29.8 KG/M2 | DIASTOLIC BLOOD PRESSURE: 72 MMHG | HEIGHT: 72 IN | TEMPERATURE: 98 F | OXYGEN SATURATION: 96 % | WEIGHT: 220 LBS

## 2023-10-11 DIAGNOSIS — S49.91XA RIGHT SHOULDER INJURY: ICD-10-CM

## 2023-10-11 DIAGNOSIS — E04.1 THYROID NODULE: ICD-10-CM

## 2023-10-11 DIAGNOSIS — S09.90XA INJURY OF HEAD, INITIAL ENCOUNTER: ICD-10-CM

## 2023-10-11 DIAGNOSIS — R59.9 LYMPH NODE ENLARGEMENT: ICD-10-CM

## 2023-10-11 DIAGNOSIS — S42.201A TRAUMATIC CLOSED FX OF PROXIMAL HUMERUS WITH MINIMAL DISPLACEMENT, RIGHT, INITIAL ENCOUNTER: Primary | ICD-10-CM

## 2023-10-11 PROCEDURE — 96375 TX/PRO/DX INJ NEW DRUG ADDON: CPT

## 2023-10-11 PROCEDURE — 63600175 PHARM REV CODE 636 W HCPCS: Performed by: PHYSICIAN ASSISTANT

## 2023-10-11 PROCEDURE — 96374 THER/PROPH/DIAG INJ IV PUSH: CPT

## 2023-10-11 PROCEDURE — 99285 EMERGENCY DEPT VISIT HI MDM: CPT | Mod: 25

## 2023-10-11 PROCEDURE — 25000003 PHARM REV CODE 250: Performed by: PHYSICIAN ASSISTANT

## 2023-10-11 PROCEDURE — 96376 TX/PRO/DX INJ SAME DRUG ADON: CPT

## 2023-10-11 RX ORDER — MORPHINE SULFATE 2 MG/ML
2 INJECTION, SOLUTION INTRAMUSCULAR; INTRAVENOUS
Status: COMPLETED | OUTPATIENT
Start: 2023-10-11 | End: 2023-10-11

## 2023-10-11 RX ORDER — OXYCODONE HYDROCHLORIDE 5 MG/1
5 TABLET ORAL
Status: COMPLETED | OUTPATIENT
Start: 2023-10-11 | End: 2023-10-11

## 2023-10-11 RX ORDER — ONDANSETRON 2 MG/ML
4 INJECTION INTRAMUSCULAR; INTRAVENOUS
Status: COMPLETED | OUTPATIENT
Start: 2023-10-11 | End: 2023-10-11

## 2023-10-11 RX ORDER — MORPHINE SULFATE 4 MG/ML
4 INJECTION, SOLUTION INTRAMUSCULAR; INTRAVENOUS
Status: COMPLETED | OUTPATIENT
Start: 2023-10-11 | End: 2023-10-11

## 2023-10-11 RX ORDER — OXYCODONE AND ACETAMINOPHEN 5; 325 MG/1; MG/1
1 TABLET ORAL EVERY 6 HOURS PRN
Qty: 15 TABLET | Refills: 0 | Status: SHIPPED | OUTPATIENT
Start: 2023-10-11 | End: 2023-10-16

## 2023-10-11 RX ADMIN — OXYCODONE HYDROCHLORIDE 5 MG: 5 TABLET ORAL at 06:10

## 2023-10-11 RX ADMIN — MORPHINE SULFATE 4 MG: 4 INJECTION INTRAVENOUS at 02:10

## 2023-10-11 RX ADMIN — MORPHINE SULFATE 2 MG: 2 INJECTION, SOLUTION INTRAMUSCULAR; INTRAVENOUS at 04:10

## 2023-10-11 RX ADMIN — ONDANSETRON 4 MG: 2 INJECTION INTRAMUSCULAR; INTRAVENOUS at 04:10

## 2023-10-11 NOTE — DISCHARGE INSTRUCTIONS
You were diagnosed with a proximal humeral fracture today.  You also have a right shoulder hemarthrosis.  This means that you have bleeding anterior joint.  No acute treatment is needed for this  Please follow up with orthopedic surgery for fracture   Please wear arm sling until you are evaluated by Orthopedic  You were given a short course of Norco for pain.  Take as needed and directed.  Cautioned as this medication is sedating.  Do not drink alcohol or drive while taking this medication.  Cautioned as this medication is addictive.    Recommend rest, ice to affected area  A thyroid nodule concerning for malignancy an enlarged lymph node were noted on your CT scan today.  Please follow-up with your PCP for further evaluation of this finding.  You were given head injury observation discharge instructions.  Please return to the emergency department if you develop any new or concerning symptoms including but not limited to headache unrelieved by Tylenol, dizziness, visual changes, intractable nausea or vomiting

## 2023-10-11 NOTE — ED PROVIDER NOTES
Encounter Date: 10/11/2023       History     Chief Complaint   Patient presents with    Fall     Fall last night when carrying groceries up the stairs. Feet slipped on stairs. Landed on floor. Did not fall down stairs. No loc. Right shoulder pain; can't lift shoulder     Shoulder Injury     67 y.o. male with PMHx of PAD, Afib (on eliquis), cholangiocarcinoma, cirrhosis, s/p liver transplant, T2DM, HTN, HLD, nephrolithiasis presents to the ED s/p fall.  Last night he was carrying groceries up the stairs when he fell forward landing on his right shoulder.  He also hit his head.  He did not lose consciousness.  Today he complains of right shoulder pain.  He has decreased motion of his shoulder. His current pain level is a 10/10.  He is taking Advil without improvement.  He was denies paresthesias, weakness, headache, visual changes, dizziness, N/V.    The history is provided by the patient.     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    Cirrhosis 11/23/2020    Diabetes mellitus, type 2     Diabetic neuropathy 11/24/2020    Disorder of kidney and ureter     HTN (hypertension) 11/23/2020    Hyperlipidemia 11/23/2020    Kidney stones 11/23/2020    S/p lithotripsy 2020    Leukocytosis 1/15/2021    Liver mass 11/23/2020    CASTILLO (nonalcoholic steatohepatitis) 11/23/2020    Nausea and vomiting 8/21/2023    Other ascites 3/16/2022    PAD (peripheral artery disease)     Portal hypertension 11/23/2020    Skin cancer 11/23/2020     Past Surgical History:   Procedure Laterality Date    COLONOSCOPY  10/2020    ESOPHAGEAL MANOMETRY WITH MEASUREMENT OF IMPEDANCE N/A 7/17/2023    Procedure: MANOMETRY, ESOPHAGUS, WITH IMPEDANCE MEASUREMENT;  Surgeon: Klarissa Zamora MD;  Location: [facility] ([address] [place] [address]);  Service: Gastroenterology;  Laterality: N/A;  pt  diabetic  liver txp  prep insructions sent to pt via email and portal -Jm    ESOPHAGOGASTRODUODENOSCOPY  10/2020    ESOPHAGOGASTRODUODENOSCOPY N/A 7/1/2021    Procedure: EGD (ESOPHAGOGASTRODUODENOSCOPY);  Surgeon: Jovan David MD;  Location: [facility] ENDO (4TH FLR);  Service: Endoscopy;  Laterality: N/A;  HCC. Listed for liver transplant. EGD for variceal surveillance.  cardiac clearance and blood thinenr approval received, see telephone encounter 6/23/21-BB  fully vaccinated-BB  labs same day of procedure-BB    EXCISION OF HYDROCELE Right     hemorroid surgery      hemorroidectomy      KIDNEY STONE SURGERY      LAPAROSCOPIC APPENDECTOMY N/A 6/2/2023    Procedure: APPENDECTOMY, LAPAROSCOPIC;  Surgeon: Shan Ross MD;  Location: [facility] OR 2ND FLR;  Service: General;  Laterality: N/A;    LEFT HEART CATHETERIZATION N/A 1/27/2021    Procedure: Left heart cath;  Surgeon: Kris Shelton MD;  Location: [facility] CATH LAB;  Service: Cardiology;  Laterality: N/A;    liver mass removal      LIVER TRANSPLANT N/A 1/6/2022    Procedure: TRANSPLANT, LIVER;  Surgeon: Lizet Garcia MD;  Location: [facility] OR 2ND FLR;  Service: Transplant;  Laterality: N/A;    RIGHT HEART CATHETERIZATION Right 5/7/2021    Procedure: INSERTION, CATHETER, RIGHT HEART;  Surgeon: Jaden Schuster MD;  Location: [facility] CATH LAB;  Service: Cardiology;  Laterality: Right;    TREATMENT OF CARDIAC ARRHYTHMIA N/A 5/19/2021    Procedure: CARDIOVERSION;  Surgeon: Didier Tilley MD;  Location: [facility] EP LAB;  Service: Cardiology;  Laterality: N/A;  a fib, dccv/johny, mac, dm. sscu    TREATMENT OF CARDIAC ARRHYTHMIA N/A 5/31/2021    Procedure: CARDIOVERSION;  Surgeon: Daniel Arambula MD;  Location: [facility] EP LAB;  Service: Cardiology;  Laterality: N/A;  afib, DCCV, anest., DM, 3 prep    TREATMENT OF CARDIAC ARRHYTHMIA N/A 7/7/2021    Procedure: Cardioversion or Defibrillation;  Surgeon: Didier Tilley MD;  Location: [facility] EP LAB;  Service: Cardiology;  Laterality:  N/A;  AF, ASHISH (cancel if complaint), DCCV, MAC, DM, 3 Prep    TREATMENT OF CARDIAC ARRHYTHMIA N/A 2021    Procedure: Cardioversion or Defibrillation;  Surgeon: Didier Tilely MD;  Location: [facility] EP LAB;  Service: Cardiology;  Laterality: N/A;  AF, ASHISH (cx if complaint), DCCV, MAC, DM, 3 Prep     Family History   Problem Relation Age of Onset    Coronary artery disease Father     Colon cancer Neg Hx     Esophageal cancer Neg Hx      Social History     Tobacco Use    Smoking status: Former     Current packs/day: 0.00     Types: Cigarettes     Quit date: 1980     Years since quittin.7    Smokeless tobacco: Former   Substance Use Topics    Alcohol use: Not Currently    Drug use: Never     Review of Systems   Constitutional:  Negative for fever.   Eyes:  Negative for visual disturbance.   Musculoskeletal:  Positive for arthralgias and myalgias.   Skin:  Positive for color change (contusion). Negative for wound.   Neurological:  Negative for dizziness, weakness, numbness and headaches.       Physical Exam     Initial Vitals [10/11/23 1258]   BP Pulse Resp Temp SpO2   (!) 131/96 82 19 98.3 °F (36.8 °C) 97 %      MAP       --         Physical Exam    Nursing note and vitals reviewed.  Constitutional: He appears well-developed and well-nourished. He is not diaphoretic. No distress.   HENT:   Head: Normocephalic. Head is with contusion (Right side of forehead).   Nose: Nose normal.   Eyes: Conjunctivae and EOM are normal.   Neck: Neck supple.   Cardiovascular:  Normal rate.           Pulmonary/Chest: No respiratory distress.   Musculoskeletal:      Right shoulder: Swelling and tenderness present. Decreased range of motion.      Right upper arm: No tenderness.      Right elbow: No tenderness.      Right forearm: No tenderness.      Cervical back: Neck supple.      Comments: Right radial pulse intact.  Normal sensation.   strength intact.  Cap refill intact.     Neurological: He is alert and oriented to  person, place, and time. He has normal strength. He exhibits normal muscle tone. GCS eye subscore is 4. GCS verbal subscore is 5. GCS motor subscore is 6.   Skin: No rash noted.   Psychiatric: He has a normal mood and affect. His speech is normal and behavior is normal. Judgment and thought content normal. Cognition and memory are normal.         ED Course   Procedures  Labs Reviewed - No data to display         Imaging Results               CT Cervical Spine Without Contrast (Final result)  Result time 10/11/23 17:54:07      Final result by Binu Gilbert MD (10/11/23 17:54:07)                   Impression:      Incidental 2 cm right thyroid nodule, suspicious for possible malignancy in this 67-year-old.    A 1 cm short-axis-diameter borderline-enlarged right lower jugular lymph node could possibly represent metastatic disease.    Correlate clinically, and with follow-up imaging (perhaps starting with follow-up outpatient thyroid sonogram).    Scattered degenerative changes with varying degrees of spinal and foraminal stenosis in the cervical spine.    Allowing for the degenerative changes, no acute fracture deformity, no traumatic vertebral malalignment, is demonstrated in the cervical spine.    Bilateral carotid and vertebral artery atherosclerotic calcifications.    This report was flagged in Epic as abnormal.      Electronically signed by: Binu Gilbert  Date:    10/11/2023  Time:    17:54               Narrative:    EXAMINATION:  CT CERVICAL SPINE WITHOUT CONTRAST    CLINICAL HISTORY:  Neck trauma (Age >= 65y);    TECHNIQUE:  Low dose axial images, sagittal and coronal reformations were performed though the cervical spine.  Contrast was not administered.    COMPARISON:  None    FINDINGS:  Preserved cervical lordosis.    Scattered multilevel degenerative disc disease and arthritic changes.  Mild to moderate posterior disc protrusions at C2-C3, C3-C4, C4-C5, and C5-C6.  This is most prominent C2-C3, where the  disc protrusion is contacts and slightly indents the mainly right posterior paracentral and contacts and slightly indents the ventral contour of the dura/spinal cord.    Mild uncovertebral hypertrophy in the midcervical spine with varying degrees of bilateral neural foraminal stenosis.    Small posterior disc osteophyte complex at C6-C7, causing moderate spinal canal stenosis.    Bilateral C6-7 uncovertebral hypertrophy, causing distension bilateral neural foraminal stenosis.    Some of the hypertrophic degenerative changes demonstrated fragment in regular appearance.  The this is favored to be on a chronic basis; acute osteophyte fractures are considered less likely.    Allowing for the above, no acute cervical vertebral fracture deformity is demonstrated.    There is no evidence of acute traumatic vertebral malalignment in the cervical spine.    No cervical prevertebral soft tissue swelling.  Scattered intrathoracic, cervical, and intracranial atherosclerotic calcifications.    Incidental 2 cm predominately hypodense, focally calcified right thyroid nodule for which malignancy is a consideration.  Subcentimeter left thyroid hypodense nodules, of dubious clinical significance.    Borderline enlarged (10 mm long axis and) right lower cervical lymph node, lateral to the right jugular vein and right thyroid lobe thyroid (series 2, image 332).  Metastatic disease not excluded.    Incidentally imaged portions of the intracranial contents demonstrate diffuse mild to moderate cerebellar parenchymal volume loss and some parenchymal volume loss in the partially visualized cerebral hemispheres bilaterally.    Visualized portions of the upper chest demonstrate no pulmonary or pleural injuries, allowing for presumed dependent atelectasis.    Prominent upper mediastinal fat, likely representing partially visualized mediastinal lipomatosis.                                       CT Head Without Contrast (Final result)  Result  time 10/11/23 17:29:52      Final result by Tej Perez MD (10/11/23 17:29:52)                   Impression:      No acute intracranial hemorrhage/injury.      Electronically signed by: Tej Perez  Date:    10/11/2023  Time:    17:29               Narrative:    EXAMINATION:  CT HEAD WITHOUT CONTRAST    CLINICAL HISTORY:  Head trauma, minor (Age >= 65y);    TECHNIQUE:  Low dose axial images were obtained through the head.  Coronal and sagittal reformations were also performed. Contrast was not administered.    COMPARISON:  None.    FINDINGS:  There is no evidence of intracranial hemorrhage or parenchymal contusion.    No hydrocephalus with an element volume loss.  No mass effect or acute territorial infarct.  Decreased attenuation within the periventricular white matter is nonspecific but may reflect mild chronic small vessel ischemic change.    Prominent atherosclerotic vascular calcifications are identified at the skull base.    The calvarium is intact.  The visualized sinuses and mastoid air cells are clear.                                       X-Ray Shoulder Trauma Right (Final result)  Result time 10/11/23 16:00:21      Final result by Binu Gilbert MD (10/11/23 16:00:21)                   Impression:      Right proximal humeral fracture with right shoulder joint hemarthrosis.      Electronically signed by: Binu Gilbert  Date:    10/11/2023  Time:    16:00               Narrative:    EXAMINATION:  XR SHOULDER TRAUMA 3 VIEW RIGHT    CLINICAL HISTORY:  Unspecified injury of right shoulder and upper arm, initial encounter    TECHNIQUE:  Three or four views of the right shoulder were performed.    COMPARISON:  None    FINDINGS:  Slightly foreshortened/impacted fracture of the right proximal humeral surgical neck.    Slight caudal displacement of the humeral head relative to the osseous glenoid fossa, likely related to a right shoulder hemarthrosis.    No high-grade shoulder separation or deloris  glenohumeral dislocation apparent.                                       Medications   morphine injection 4 mg (4 mg Intravenous Given 10/11/23 1427)   morphine injection 2 mg (2 mg Intravenous Given 10/11/23 1630)   ondansetron injection 4 mg (4 mg Intravenous Given 10/11/23 1630)   oxyCODONE immediate release tablet 5 mg (5 mg Oral Given 10/11/23 1822)     Medical Decision Making  67 y.o. male with PMHx of PAD, Afib (on eliquis), cholangiocarcinoma, cirrhosis, s/p liver transplant, T2DM, HTN, HLD, nephrolithiasis presents to the ED s/p fall.  He appears in pain on presentation. Hemodynamically stable.  Oriented x3.  Neurovascularly intact. Exam as above. I will initiate workup and reassess.      Ddx:  Fracture, dislocation, contusion, intracranial hemorrhage    X-ray significant for Slightly foreshortened/impacted fracture of the right proximal humeral surgical neck.  Minimal displacement due to joint hemarthrosis.  I discussed case with orthopedic surgery who recommends follow up outpatient.  No acute intervention is indicated at this time.  CT head negative for intracranial hemorrhage.  CT neck negative for fracture or dislocation.  Thyroid nodule concerning for malignancy and jugular lymphadenopathy noted.  Patient made aware of this finding.  I recommended that he follow up with his PCP for further evaluation.  He was agreeable with this plan.  I placed patient in shoulder sling today.  Pain control provided.  A referral was placed with orthopedic surgery.  Strict ED precautions given to return immediately for new, concerning or worsening symptoms    Amount and/or Complexity of Data Reviewed  Radiology: ordered.    Risk  Prescription drug management.                               Clinical Impression:   Final diagnoses:  [S49.91XA] Right shoulder injury  [S42.201A] Traumatic closed fx of proximal humerus with minimal displacement, right, initial encounter (Primary)  [S09.90XA] Injury of head, initial  encounter  [E04.1] Thyroid nodule  [R59.9] Lymph node enlargement        ED Disposition Condition    Discharge Stable          ED Prescriptions       Medication Sig Dispense Start Date End Date Auth. Provider    oxyCODONE-acetaminophen (PERCOCET) 5-325 mg per tablet Take 1 tablet by mouth every 6 (six) hours as needed for Pain. 15 tablet 10/11/2023 10/15/2023 Mya Sharif PA-C          Follow-up Information       Follow up With Specialties Details Why Contact Info Additional Information    [facility] - Orthopedics [facility]  If symptoms worsen [address] [facility], [address]th Floor  [facility] [id number] [phone] [facility] - Main Building, 5th Floor Please park in [facility] and take Atrium elevator    [facility] - Emergency Dept Emergency Medicine Schedule an appointment as soon as possible for a visit   [address] [facility] [address] [phone]     Thais Maxwell MD Transplant, Hepatology   [address] [facility] [address]  [phone]                Mya Sharif PA-C  10/11/23 2004

## 2023-10-11 NOTE — TELEPHONE ENCOUNTER
Spoke with pt's wife.  Pt is currently in the ED for evaluation.  Advised wife if something needs to be addressed by orthopedics, the ED staff can page the Orthopedist on call to see him in the ED.   Wife verbalized understanding

## 2023-10-11 NOTE — TELEPHONE ENCOUNTER
----- Message from Sandy Bell MA sent at 10/11/2023 11:39 AM CDT -----  Regarding: FW: appt    ----- Message -----  From: Hansa Key  Sent: 10/11/2023  11:24 AM CDT  To: [facility] Ortho Clinical Support  Subject: appt                                             Pt spouse calling in regards to pt falling last night, feels he torn something in his right shoulder ,wants to be seen today if possible , please call     Confirmed patient's contact info below:  Contact Name: Tej Purdy  Phone Number: [phone]

## 2023-10-11 NOTE — ED TRIAGE NOTES
Patient is a 67 year old male that presents to the ED with c/o right shoulder pain/injury due to a fall yesterday. Pt had a mechanical fall yesterday going up the stairs while carrying groceries, denies LOC, did hit his head and on blood thinners.

## 2023-10-16 ENCOUNTER — OFFICE VISIT (OUTPATIENT)
Dept: ORTHOPEDICS | Facility: CLINIC | Age: 68
End: 2023-10-16
Payer: MEDICARE

## 2023-10-16 VITALS — WEIGHT: 210.56 LBS | HEIGHT: 72 IN | BODY MASS INDEX: 28.52 KG/M2

## 2023-10-16 DIAGNOSIS — S42.201A TRAUMATIC CLOSED FX OF PROXIMAL HUMERUS WITH MINIMAL DISPLACEMENT, RIGHT, INITIAL ENCOUNTER: Primary | ICD-10-CM

## 2023-10-16 PROCEDURE — 1101F PR PT FALLS ASSESS DOC 0-1 FALLS W/OUT INJ PAST YR: ICD-10-PCS | Mod: CPTII,S$GLB,, | Performed by: PHYSICIAN ASSISTANT

## 2023-10-16 PROCEDURE — 99999 PR PBB SHADOW E&M-EST. PATIENT-LVL III: CPT | Mod: PBBFAC,,, | Performed by: PHYSICIAN ASSISTANT

## 2023-10-16 PROCEDURE — 3288F PR FALLS RISK ASSESSMENT DOCUMENTED: ICD-10-PCS | Mod: CPTII,S$GLB,, | Performed by: PHYSICIAN ASSISTANT

## 2023-10-16 PROCEDURE — 1160F PR REVIEW ALL MEDS BY PRESCRIBER/CLIN PHARMACIST DOCUMENTED: ICD-10-PCS | Mod: CPTII,S$GLB,, | Performed by: PHYSICIAN ASSISTANT

## 2023-10-16 PROCEDURE — 99203 PR OFFICE/OUTPT VISIT, NEW, LEVL III, 30-44 MIN: ICD-10-PCS | Mod: S$GLB,,, | Performed by: PHYSICIAN ASSISTANT

## 2023-10-16 PROCEDURE — 1159F MED LIST DOCD IN RCRD: CPT | Mod: CPTII,S$GLB,, | Performed by: PHYSICIAN ASSISTANT

## 2023-10-16 PROCEDURE — 99999 PR PBB SHADOW E&M-EST. PATIENT-LVL III: ICD-10-PCS | Mod: PBBFAC,,, | Performed by: PHYSICIAN ASSISTANT

## 2023-10-16 PROCEDURE — 1125F AMNT PAIN NOTED PAIN PRSNT: CPT | Mod: CPTII,S$GLB,, | Performed by: PHYSICIAN ASSISTANT

## 2023-10-16 PROCEDURE — 1125F PR PAIN SEVERITY QUANTIFIED, PAIN PRESENT: ICD-10-PCS | Mod: CPTII,S$GLB,, | Performed by: PHYSICIAN ASSISTANT

## 2023-10-16 PROCEDURE — 1159F PR MEDICATION LIST DOCUMENTED IN MEDICAL RECORD: ICD-10-PCS | Mod: CPTII,S$GLB,, | Performed by: PHYSICIAN ASSISTANT

## 2023-10-16 PROCEDURE — 1160F RVW MEDS BY RX/DR IN RCRD: CPT | Mod: CPTII,S$GLB,, | Performed by: PHYSICIAN ASSISTANT

## 2023-10-16 PROCEDURE — 1101F PT FALLS ASSESS-DOCD LE1/YR: CPT | Mod: CPTII,S$GLB,, | Performed by: PHYSICIAN ASSISTANT

## 2023-10-16 PROCEDURE — 3044F HG A1C LEVEL LT 7.0%: CPT | Mod: CPTII,S$GLB,, | Performed by: PHYSICIAN ASSISTANT

## 2023-10-16 PROCEDURE — 3044F PR MOST RECENT HEMOGLOBIN A1C LEVEL <7.0%: ICD-10-PCS | Mod: CPTII,S$GLB,, | Performed by: PHYSICIAN ASSISTANT

## 2023-10-16 PROCEDURE — 3008F BODY MASS INDEX DOCD: CPT | Mod: CPTII,S$GLB,, | Performed by: PHYSICIAN ASSISTANT

## 2023-10-16 PROCEDURE — 3288F FALL RISK ASSESSMENT DOCD: CPT | Mod: CPTII,S$GLB,, | Performed by: PHYSICIAN ASSISTANT

## 2023-10-16 PROCEDURE — 3008F PR BODY MASS INDEX (BMI) DOCUMENTED: ICD-10-PCS | Mod: CPTII,S$GLB,, | Performed by: PHYSICIAN ASSISTANT

## 2023-10-16 PROCEDURE — 99203 OFFICE O/P NEW LOW 30 MIN: CPT | Mod: S$GLB,,, | Performed by: PHYSICIAN ASSISTANT

## 2023-10-16 RX ORDER — OXYCODONE AND ACETAMINOPHEN 10; 325 MG/1; MG/1
1 TABLET ORAL EVERY 6 HOURS PRN
Qty: 20 TABLET | Refills: 0 | Status: SHIPPED | OUTPATIENT
Start: 2023-10-16 | End: 2023-10-30 | Stop reason: ALTCHOICE

## 2023-10-16 NOTE — PROGRESS NOTES
SUBJECTIVE:     Chief Complaint & History of Present Illness:  Tej Purdy is a 67 y.o. year old male s/p liver transplant who presents today with right shoulder pain s/p fall on 10/10/23. He was seen in the ED and diagnosed with proximal humerus fracture. The pain is located in the  lateral and  upper arm aspect of the shoulder.  The pain is described as achy.  He has swelling and bruising.  He has been in a sling.  He is taking percocet for pain.  He is in Eliquis.  There is not a history of previous injury or surgery to the shoulder.      Review of patient's allergies indicates:   Allergen Reactions    Bee pollens Swelling     BEE STINGS swells body         Current Outpatient Medications   Medication Sig Dispense Refill    amiodarone (PACERONE) 200 MG Tab Take 1 tablet (200 mg total) by mouth once daily. 30 tablet 11    amLODIPine (NORVASC) 10 MG tablet Take 10 mg by mouth once daily.      apixaban (ELIQUIS) 5 mg Tab Take 1 tablet (5 mg total) by mouth 2 (two) times daily. 60 tablet 3    aspirin (ECOTRIN) 81 MG EC tablet Take 81 mg by mouth once daily.      blood sugar diagnostic Strp To check BG 2 times daily, to use with insurance preferred meter (patient has a [employer] self-monitoring glucose meter) 100 strip 11    blood-glucose meter kit To check BG 2 times daily, to use with insurance preferred meter 1 each 0    buPROPion (WELLBUTRIN XL) 150 MG TB24 tablet Take 1 tablet (150 mg total) by mouth every morning. 30 tablet 2    calcium carbonate-vitamin D3 600 mg-20 mcg (800 unit) Tab Take 1 tablet by mouth once daily. 30 tablet 5    furosemide (LASIX) 20 MG tablet Take 1 tablet (20 mg total) by mouth once daily. 30 tablet 2    gabapentin (NEURONTIN) 300 MG capsule Take 1 capsule (300 mg total) by mouth 3 (three) times daily as needed (neuropathy). 90 capsule 5    insulin (LANTUS SOLOSTAR U-100 INSULIN) glargine 100 units/mL SubQ pen INJECT 22 UNITS INTO THE SKIN EVERY EVENING. ADJUST DOSE UNTIL AM  "GLUCOSE GOAL . MAX DOSE 30 UNITS/DY 15 mL 11    insulin lispro (HUMALOG KWIKPEN INSULIN) 100 unit/mL pen Inject 6 Units into the skin 3 (three) times daily with meals. Plus sliding scale, MDD: 48 units 15 mL 11    lancets Misc To check BG 2 times daily, to use with insurance preferred meter 100 each 11    lovastatin (MEVACOR) 20 MG tablet Take 20 mg by mouth once daily.      magnesium oxide (MAG-OX) 400 mg (241.3 mg magnesium) tablet Take 2 tablets (800 mg total) by mouth 3 (three) times daily. 180 tablet 6    multivitamin capsule Take 1 capsule by mouth once daily.      omega-3 fatty acids/fish oil (FISH OIL-OMEGA-3 FATTY ACIDS) 300-1,000 mg capsule Take 1 capsule by mouth once daily.       ondansetron (ZOFRAN-ODT) 4 MG TbDL Dissolve 1 tablet (4 mg total) by mouth every 8 (eight) hours as needed (nausea). 30 tablet 5    pantoprazole (PROTONIX) 40 MG tablet Take 1 tablet (40 mg total) by mouth once daily. 30 tablet 5    pen needle, diabetic (BD ULTRA-FINE GÉNESIS PEN NEEDLE) 32 gauge x 5/32" Ndle Use to inject insulin 4 times daily 200 each 11    pulse oximeter (PULSE OXIMETER) device by Apply Externally route 2 (two) times a day. Use twice daily at 8 AM and 3 PM and record the value in [facility] as directed. 1 each 0    QUEtiapine (SEROQUEL) 100 MG Tab Take 1 tablet (100 mg total) by mouth every evening. 30 tablet 2    sertraline (ZOLOFT) 25 MG tablet Take 1 tablet (25 mg total) by mouth once daily. 30 tablet 5    tacrolimus (PROGRAF) 0.5 MG Cap Take 2 capsules (1 mg total) by mouth every 12 (twelve) hours. 120 capsule 11    calcium carbonate (TUMS) 200 mg calcium (500 mg) chewable tablet Take 1 tablet (500 mg total) by mouth 3 (three) times daily as needed for Heartburn. 90 tablet 5    oxyCODONE-acetaminophen (PERCOCET)  mg per tablet Take 1 tablet by mouth every 6 (six) hours as needed for Pain. 20 tablet 0    sertraline (ZOLOFT) 25 MG tablet Take 1 tablet (25 mg total) by mouth once daily. 30 tablet 5    " zolpidem (AMBIEN) 5 MG Tab Take 1 tablet (5 mg total) by mouth nightly as needed (insomnia). 30 tablet 0     No current facility-administered medications for this visit.     Facility-Administered Medications Ordered in Other Visits   Medication Dose Route Frequency Provider Last Rate Last Admin    sodium chloride 0.9% bolus 1,000 mL  1,000 mL Intravenous Once Solange Bill, NP           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    Cirrhosis 11/23/2020    Diabetes mellitus, type 2     Diabetic neuropathy 11/24/2020    Disorder of kidney and ureter     HTN (hypertension) 11/23/2020    Hyperlipidemia 11/23/2020    Kidney stones 11/23/2020    S/p lithotripsy 2020    Leukocytosis 1/15/2021    Liver mass 11/23/2020    CASTILLO (nonalcoholic steatohepatitis) 11/23/2020    Nausea and vomiting 8/21/2023    Other ascites 3/16/2022    PAD (peripheral artery disease)     Portal hypertension 11/23/2020    Skin cancer 11/23/2020       Past Surgical History:   Procedure Laterality Date    COLONOSCOPY  10/2020    ESOPHAGEAL MANOMETRY WITH MEASUREMENT OF IMPEDANCE N/A 7/17/2023    Procedure: MANOMETRY, ESOPHAGUS, WITH IMPEDANCE MEASUREMENT;  Surgeon: Klarissa Zamora MD;  Location: [facility] ([address] [place] [address]);  Service: Gastroenterology;  Laterality: N/A;  pt diabetic  liver txp  prep insructions sent to pt via email and portal -    ESOPHAGOGASTRODUODENOSCOPY  10/2020    ESOPHAGOGASTRODUODENOSCOPY N/A 7/1/2021    Procedure: EGD (ESOPHAGOGASTRODUODENOSCOPY);  Surgeon: Jovan David MD;  Location: [facility] ([facility]  Service: Endoscopy;  Laterality: N/A;  HCC. Listed for liver transplant. EGD for variceal surveillance.  cardiac clearance and blood thinenr approval received, see telephone encounter 6/23/21-BB  fully vaccinated-BB  labs same day of procedure-BB    EXCISION OF HYDROCELE Right     hemorroid surgery      hemorroidectomy      KIDNEY STONE SURGERY      LAPAROSCOPIC  APPENDECTOMY N/A 6/2/2023    Procedure: APPENDECTOMY, LAPAROSCOPIC;  Surgeon: Shan Ross MD;  Location: [facility] OR [facility] FLR;  Service: General;  Laterality: N/A;    LEFT HEART CATHETERIZATION N/A 1/27/2021    Procedure: Left heart cath;  Surgeon: Kris Shelton MD;  Location: [facility] CATH LAB;  Service: Cardiology;  Laterality: N/A;    liver mass removal      LIVER TRANSPLANT N/A 1/6/2022    Procedure: TRANSPLANT, LIVER;  Surgeon: Lizet Garcia MD;  Location: [facility] OR 2ND FLR;  Service: Transplant;  Laterality: N/A;    RIGHT HEART CATHETERIZATION Right 5/7/2021    Procedure: INSERTION, CATHETER, RIGHT HEART;  Surgeon: Jaden Schuster MD;  Location: [facility] CATH LAB;  Service: Cardiology;  Laterality: Right;    TREATMENT OF CARDIAC ARRHYTHMIA N/A 5/19/2021    Procedure: CARDIOVERSION;  Surgeon: Didier Tilley MD;  Location: [facility] EP LAB;  Service: Cardiology;  Laterality: N/A;  a fib, dccv/johny, mac, dm. sscu    TREATMENT OF CARDIAC ARRHYTHMIA N/A 5/31/2021    Procedure: CARDIOVERSION;  Surgeon: Daniel Arambula MD;  Location: [facility] EP LAB;  Service: Cardiology;  Laterality: N/A;  afib, DCCV, anest., DM, 3 prep    TREATMENT OF CARDIAC ARRHYTHMIA N/A 7/7/2021    Procedure: Cardioversion or Defibrillation;  Surgeon: Didier Tilley MD;  Location: [facility] EP LAB;  Service: Cardiology;  Laterality: N/A;  AF, JOHNY (cancel if complaint), DCCV, MAC, DM, 3 Prep    TREATMENT OF CARDIAC ARRHYTHMIA N/A 7/27/2021    Procedure: Cardioversion or Defibrillation;  Surgeon: Didier Tilley MD;  Location: [facility] EP LAB;  Service: Cardiology;  Laterality: N/A;  AF, JOHNY (cx if complaint), DCCV, MAC, DM, 3 Prep       Review of Systems:  ROS:  Constitutional: no fever or chills  Eyes: no visual changes  ENT: no nasal congestion or sore throat  Respiratory: no cough or shortness of breath  Musculoskeletal: no arthralgias or myalgias      OBJECTIVE:     PHYSICAL EXAM:    General: Weight: 95.5 kg (210 lb 8.6 oz) Body mass index is 28.55  kg/m².  Patient is alert, awake and oriented to time, place and person. Mood and affect are appropriate.  Patient does not appear to be in any distress, denies any constitutional symptoms and appears stated age.   HEENT: Pupils are equal and round, sclera are not injected. External examination of ears and nose reveals no abnormalities. Cranial nerves II-X are grossly intact  Neck: examination demonstrates painless active range of motion. Spurling's sign is negative  Skin: no rashes, abrasions or open wounds on the affected extremity   Resp: No respiratory distress or audible wheezing   Psych:  normal mood and behavior  CV: 2+  pulses, all extremities warm and well perfused   Right Shoulder   Skin intact  Diffuse ecchymosis upper arm  No deformity  Tenderness: globally  Range of motion is painful   ROM: not tested due to fracture  Shoulder Strength: not tested  sensory exam normal and motor exam normal      IMAGING:  X-rays of the right shoulder, personally reviewed by me, demonstrate proximal humerus fracture in satisfactory position.      ASSESSMENT     1. Traumatic closed fx of proximal humerus with minimal displacement, right, initial encounter      PLAN:     Discussed with the patient at length all the different treatment options available for his right shoulder including anti-inflammatories, acetaminophen, rest, ice, Physical therapy, occasional cortisone injections for temporary relief, and shoulder arthroscopy    - Remain NWB RUE  - Continue sling  - Ok to remove for elbow, wrist, finger ROM  - Percocet refill -  reviewed  - Follow up 2 weeks with x-rays right shoulder (2v AP, SCAP-Y)

## 2023-10-17 ENCOUNTER — LAB VISIT (OUTPATIENT)
Dept: LAB | Facility: HOSPITAL | Age: 68
End: 2023-10-17
Attending: INTERNAL MEDICINE
Payer: MEDICARE

## 2023-10-17 DIAGNOSIS — E83.42 HYPOMAGNESEMIA: ICD-10-CM

## 2023-10-17 DIAGNOSIS — Z94.4 S/P LIVER TRANSPLANT: ICD-10-CM

## 2023-10-17 LAB
ALBUMIN SERPL BCP-MCNC: 3.7 G/DL (ref 3.5–5.2)
ALP SERPL-CCNC: 77 U/L (ref 55–135)
ALT SERPL W/O P-5'-P-CCNC: 18 U/L (ref 10–44)
ANION GAP SERPL CALC-SCNC: 11 MMOL/L (ref 8–16)
AST SERPL-CCNC: 24 U/L (ref 10–40)
BASOPHILS # BLD AUTO: 0.15 K/UL (ref 0–0.2)
BASOPHILS NFR BLD: 1.1 % (ref 0–1.9)
BILIRUB SERPL-MCNC: 0.7 MG/DL (ref 0.1–1)
BUN SERPL-MCNC: 22 MG/DL (ref 8–23)
CALCIUM SERPL-MCNC: 9.2 MG/DL (ref 8.7–10.5)
CHLORIDE SERPL-SCNC: 102 MMOL/L (ref 95–110)
CO2 SERPL-SCNC: 25 MMOL/L (ref 23–29)
CREAT SERPL-MCNC: 1.3 MG/DL (ref 0.5–1.4)
DIFFERENTIAL METHOD: ABNORMAL
EOSINOPHIL # BLD AUTO: 0.3 K/UL (ref 0–0.5)
EOSINOPHIL NFR BLD: 2.2 % (ref 0–8)
ERYTHROCYTE [DISTWIDTH] IN BLOOD BY AUTOMATED COUNT: 20.9 % (ref 11.5–14.5)
EST. GFR  (NO RACE VARIABLE): 59.5 ML/MIN/1.73 M^2
GLUCOSE SERPL-MCNC: 130 MG/DL (ref 70–110)
HCT VFR BLD AUTO: 41.2 % (ref 40–54)
HGB BLD-MCNC: 11.1 G/DL (ref 14–18)
IMM GRANULOCYTES # BLD AUTO: 0.1 K/UL (ref 0–0.04)
IMM GRANULOCYTES NFR BLD AUTO: 0.8 % (ref 0–0.5)
LYMPHOCYTES # BLD AUTO: 2.3 K/UL (ref 1–4.8)
LYMPHOCYTES NFR BLD: 17.1 % (ref 18–48)
MAGNESIUM SERPL-MCNC: 2.3 MG/DL (ref 1.6–2.6)
MCH RBC QN AUTO: 19.4 PG (ref 27–31)
MCHC RBC AUTO-ENTMCNC: 26.9 G/DL (ref 32–36)
MCV RBC AUTO: 72 FL (ref 82–98)
MONOCYTES # BLD AUTO: 1.2 K/UL (ref 0.3–1)
MONOCYTES NFR BLD: 9.1 % (ref 4–15)
NEUTROPHILS # BLD AUTO: 9.3 K/UL (ref 1.8–7.7)
NEUTROPHILS NFR BLD: 69.7 % (ref 38–73)
NRBC BLD-RTO: 0 /100 WBC
PLATELET # BLD AUTO: 285 K/UL (ref 150–450)
PMV BLD AUTO: ABNORMAL FL (ref 9.2–12.9)
POTASSIUM SERPL-SCNC: 4.4 MMOL/L (ref 3.5–5.1)
PROT SERPL-MCNC: 7.6 G/DL (ref 6–8.4)
RBC # BLD AUTO: 5.73 M/UL (ref 4.6–6.2)
SODIUM SERPL-SCNC: 138 MMOL/L (ref 136–145)
WBC # BLD AUTO: 13.31 K/UL (ref 3.9–12.7)

## 2023-10-17 PROCEDURE — 80053 COMPREHEN METABOLIC PANEL: CPT | Performed by: INTERNAL MEDICINE

## 2023-10-17 PROCEDURE — 36415 COLL VENOUS BLD VENIPUNCTURE: CPT | Mod: PN | Performed by: INTERNAL MEDICINE

## 2023-10-17 PROCEDURE — 83735 ASSAY OF MAGNESIUM: CPT | Performed by: INTERNAL MEDICINE

## 2023-10-17 PROCEDURE — 85025 COMPLETE CBC W/AUTO DIFF WBC: CPT | Performed by: INTERNAL MEDICINE

## 2023-10-17 PROCEDURE — 80197 ASSAY OF TACROLIMUS: CPT | Performed by: INTERNAL MEDICINE

## 2023-10-18 LAB — TACROLIMUS BLD-MCNC: 7.3 NG/ML (ref 5–15)

## 2023-10-19 ENCOUNTER — TELEPHONE (OUTPATIENT)
Dept: TRANSPLANT | Facility: CLINIC | Age: 68
End: 2023-10-19
Payer: MEDICARE

## 2023-10-19 ENCOUNTER — PATIENT MESSAGE (OUTPATIENT)
Dept: TRANSPLANT | Facility: CLINIC | Age: 68
End: 2023-10-19
Payer: MEDICARE

## 2023-10-19 NOTE — TELEPHONE ENCOUNTER
Sent patient message via portal to let him know labs are stable.  No medication changes, next labs due on 11/13/23      ----- Message from Thais Maxwell MD sent at 10/18/2023  5:47 PM CDT -----  The Labs are stable - please let patient know.  Repeat in 4 weeks

## 2023-10-24 ENCOUNTER — OFFICE VISIT (OUTPATIENT)
Dept: DERMATOLOGY | Facility: CLINIC | Age: 68
End: 2023-10-24
Payer: MEDICARE

## 2023-10-24 DIAGNOSIS — Z12.83 SCREENING EXAM FOR SKIN CANCER: Primary | ICD-10-CM

## 2023-10-24 DIAGNOSIS — Z85.828 HISTORY OF NONMELANOMA SKIN CANCER: ICD-10-CM

## 2023-10-24 DIAGNOSIS — Z12.83 SCREENING FOR SKIN CANCER: ICD-10-CM

## 2023-10-24 DIAGNOSIS — L82.1 SEBORRHEIC KERATOSES: ICD-10-CM

## 2023-10-24 PROCEDURE — 99999 PR PBB SHADOW E&M-EST. PATIENT-LVL IV: CPT | Mod: PBBFAC,,, | Performed by: DERMATOLOGY

## 2023-10-24 PROCEDURE — 1160F PR REVIEW ALL MEDS BY PRESCRIBER/CLIN PHARMACIST DOCUMENTED: ICD-10-PCS | Mod: CPTII,S$GLB,, | Performed by: DERMATOLOGY

## 2023-10-24 PROCEDURE — 99213 OFFICE O/P EST LOW 20 MIN: CPT | Mod: S$GLB,,, | Performed by: DERMATOLOGY

## 2023-10-24 PROCEDURE — 1100F PTFALLS ASSESS-DOCD GE2>/YR: CPT | Mod: CPTII,S$GLB,, | Performed by: DERMATOLOGY

## 2023-10-24 PROCEDURE — 3288F PR FALLS RISK ASSESSMENT DOCUMENTED: ICD-10-PCS | Mod: CPTII,S$GLB,, | Performed by: DERMATOLOGY

## 2023-10-24 PROCEDURE — 1100F PR PT FALLS ASSESS DOC 2+ FALLS/FALL W/INJURY/YR: ICD-10-PCS | Mod: CPTII,S$GLB,, | Performed by: DERMATOLOGY

## 2023-10-24 PROCEDURE — 99999 PR PBB SHADOW E&M-EST. PATIENT-LVL IV: ICD-10-PCS | Mod: PBBFAC,,, | Performed by: DERMATOLOGY

## 2023-10-24 PROCEDURE — 3044F PR MOST RECENT HEMOGLOBIN A1C LEVEL <7.0%: ICD-10-PCS | Mod: CPTII,S$GLB,, | Performed by: DERMATOLOGY

## 2023-10-24 PROCEDURE — 1160F RVW MEDS BY RX/DR IN RCRD: CPT | Mod: CPTII,S$GLB,, | Performed by: DERMATOLOGY

## 2023-10-24 PROCEDURE — 3288F FALL RISK ASSESSMENT DOCD: CPT | Mod: CPTII,S$GLB,, | Performed by: DERMATOLOGY

## 2023-10-24 PROCEDURE — 1159F PR MEDICATION LIST DOCUMENTED IN MEDICAL RECORD: ICD-10-PCS | Mod: CPTII,S$GLB,, | Performed by: DERMATOLOGY

## 2023-10-24 PROCEDURE — 1125F PR PAIN SEVERITY QUANTIFIED, PAIN PRESENT: ICD-10-PCS | Mod: CPTII,S$GLB,, | Performed by: DERMATOLOGY

## 2023-10-24 PROCEDURE — 3044F HG A1C LEVEL LT 7.0%: CPT | Mod: CPTII,S$GLB,, | Performed by: DERMATOLOGY

## 2023-10-24 PROCEDURE — 1125F AMNT PAIN NOTED PAIN PRSNT: CPT | Mod: CPTII,S$GLB,, | Performed by: DERMATOLOGY

## 2023-10-24 PROCEDURE — 99213 PR OFFICE/OUTPT VISIT, EST, LEVL III, 20-29 MIN: ICD-10-PCS | Mod: S$GLB,,, | Performed by: DERMATOLOGY

## 2023-10-24 PROCEDURE — 1159F MED LIST DOCD IN RCRD: CPT | Mod: CPTII,S$GLB,, | Performed by: DERMATOLOGY

## 2023-10-24 NOTE — PROGRESS NOTES
Subjective:      Patient ID:  Tej Purdy is a 67 y.o. male who presents for   Chief Complaint   Patient presents with    Skin Check     TBSE     History of Present Illness: The patient presents for TBSE.    The patient was last seen on: 12/9/2020 for shave bx to L inguinal fold - pigmented SK.  This is a high risk patient here to check for the development of new lesions.   Hx of hepatocellular carcinoma and NMSC.    Other skin complaints: none     Had recent fall R shoulder; arm is bruised and is in sling. Here with wife today.    Review of Systems   Skin:  Positive for activity-related sunscreen use and wears hat. Negative for daily sunscreen use and recent sunburn.   Hematologic/Lymphatic: Bruises/bleeds easily.       Objective:   Physical Exam   Constitutional: He appears well-developed and well-nourished.   Neurological: He is alert and oriented to person, place, and time.   Skin:   Areas Examined (abnormalities noted in diagram):   Scalp / Hair Palpated and Inspected  Head / Face Inspection Performed  Neck Inspection Performed  Chest / Axilla Inspection Performed  Abdomen Inspection Performed  Genitals / Buttocks / Groin Inspection Performed  Back Inspection Performed  RUE Inspected  LUE Inspection Performed  RLE Inspected  LLE Inspection Performed  Nails and Digits Inspection Performed            Diagram Legend     Erythematous scaling macule/papule c/w actinic keratosis       Vascular papule c/w angioma      Pigmented verrucoid papule/plaque c/w seborrheic keratosis      Yellow umbilicated papule c/w sebaceous hyperplasia      Irregularly shaped tan macule c/w lentigo     1-2 mm smooth white papules consistent with Milia      Movable subcutaneous cyst with punctum c/w epidermal inclusion cyst      Subcutaneous movable cyst c/w pilar cyst      Firm pink to brown papule c/w dermatofibroma      Pedunculated fleshy papule(s) c/w skin tag(s)      Evenly pigmented macule c/w junctional nevus     Mildly  variegated pigmented, slightly irregular-bordered macule c/w mildly atypical nevus      Flesh colored to evenly pigmented papule c/w intradermal nevus       Pink pearly papule/plaque c/w basal cell carcinoma      Erythematous hyperkeratotic cursted plaque c/w SCC      Surgical scar with no sign of skin cancer recurrence      Open and closed comedones      Inflammatory papules and pustules      Verrucoid papule consistent consistent with wart     Erythematous eczematous patches and plaques     Dystrophic onycholytic nail with subungual debris c/w onychomycosis     Umbilicated papule    Erythematous-base heme-crusted tan verrucoid plaque consistent with inflamed seborrheic keratosis     Erythematous Silvery Scaling Plaque c/w Psoriasis     See annotation      Assessment / Plan:          Screening for skin cancer  Total body skin examination performed today including at least 12 points as noted in physical examination. No lesions suspicious for malignancy noted.    Recommend daily sun protection/avoidance, use of at least SPF 30, broad spectrum sunscreen (OTC drug), skin self examinations, and routine physician surveillance to optimize early detection    History of nonmelanoma skin cancer  Patients that have undergone transplants have a significantly higher rate of skin cancer development over time due to immunosuppression.  Total body skin cancer screenings should be performed at least once a year.    We reviewed the importance of proper sun protection, including wearing a hat, sunblock of at least SPF 30, and sun protective clothing.  It is especially important to reapply sunblock for any prolonged sun exposure.    Seborrheic keratoses    These are benign inherited growths without a malignant potential. Reassurance given to patient. No treatment is necessary.            Follow up in about 1 year (around 10/24/2024) for for TBSE.

## 2023-10-24 NOTE — PROGRESS NOTES
Subjective:      Patient ID:  Tej Purdy is a 67 y.o. male who presents for   Chief Complaint   Patient presents with    Skin Check     TBSE     History of Present Illness: The patient presents for TBSE.    The patient was last seen on: 12/9/2020 for shave bx to L inguinal fold - pigmented SK.  This is a high risk patient here to check for the development of new lesions.   Hx of hepatocellular carcinoma and NMSC.    Other skin complaints: none     Review of Systems   Skin:  Positive for activity-related sunscreen use and wears hat. Negative for daily sunscreen use and recent sunburn.   Hematologic/Lymphatic: Bruises/bleeds easily.       Objective:   Physical Exam   Constitutional: He appears well-developed and well-nourished.   Neurological: He is alert and oriented to person, place, and time.   Skin:   Areas Examined (abnormalities noted in diagram):   Scalp / Hair Palpated and Inspected  Head / Face Inspection Performed  Neck Inspection Performed  Chest / Axilla Inspection Performed  Abdomen Inspection Performed  Genitals / Buttocks / Groin Inspection Performed  Back Inspection Performed  RUE Inspected  LUE Inspection Performed  RLE Inspected  LLE Inspection Performed  Nails and Digits Inspection Performed          Diagram Legend     Erythematous scaling macule/papule c/w actinic keratosis       Vascular papule c/w angioma      Pigmented verrucoid papule/plaque c/w seborrheic keratosis      Yellow umbilicated papule c/w sebaceous hyperplasia      Irregularly shaped tan macule c/w lentigo     1-2 mm smooth white papules consistent with Milia      Movable subcutaneous cyst with punctum c/w epidermal inclusion cyst      Subcutaneous movable cyst c/w pilar cyst      Firm pink to brown papule c/w dermatofibroma      Pedunculated fleshy papule(s) c/w skin tag(s)      Evenly pigmented macule c/w junctional nevus     Mildly variegated pigmented, slightly irregular-bordered macule c/w mildly atypical nevus       Flesh colored to evenly pigmented papule c/w intradermal nevus       Pink pearly papule/plaque c/w basal cell carcinoma      Erythematous hyperkeratotic cursted plaque c/w SCC      Surgical scar with no sign of skin cancer recurrence      Open and closed comedones      Inflammatory papules and pustules      Verrucoid papule consistent consistent with wart     Erythematous eczematous patches and plaques     Dystrophic onycholytic nail with subungual debris c/w onychomycosis     Umbilicated papule    Erythematous-base heme-crusted tan verrucoid plaque consistent with inflamed seborrheic keratosis     Erythematous Silvery Scaling Plaque c/w Psoriasis     See annotation      Assessment / Plan:        Screening exam for skin cancer    Screening for skin cancer  -     Ambulatory referral/consult to Dermatology    History of nonmelanoma skin cancer             Follow up in about 1 year (around 10/24/2024) for for TBSE.

## 2023-10-30 RX ORDER — OXYCODONE AND ACETAMINOPHEN 10; 325 MG/1; MG/1
1 TABLET ORAL EVERY 6 HOURS PRN
Qty: 20 TABLET | Refills: 0 | OUTPATIENT
Start: 2023-10-30

## 2023-10-30 RX ORDER — OXYCODONE HYDROCHLORIDE 10 MG/1
10 TABLET ORAL EVERY 6 HOURS PRN
Qty: 28 TABLET | Refills: 0 | Status: SHIPPED | OUTPATIENT
Start: 2023-10-30 | End: 2023-11-06

## 2023-11-02 ENCOUNTER — HOSPITAL ENCOUNTER (OUTPATIENT)
Dept: RADIOLOGY | Facility: HOSPITAL | Age: 68
Discharge: HOME | End: 2023-11-02
Attending: PHYSICIAN ASSISTANT
Payer: MEDICARE

## 2023-11-02 ENCOUNTER — OFFICE VISIT (OUTPATIENT)
Dept: ORTHOPEDICS | Facility: CLINIC | Age: 68
End: 2023-11-02
Payer: MEDICARE

## 2023-11-02 DIAGNOSIS — S42.201A TRAUMATIC CLOSED FX OF PROXIMAL HUMERUS WITH MINIMAL DISPLACEMENT, RIGHT, INITIAL ENCOUNTER: ICD-10-CM

## 2023-11-02 DIAGNOSIS — S42.294D OTHER CLOSED NONDISPLACED FRACTURE OF PROXIMAL END OF RIGHT HUMERUS WITH ROUTINE HEALING, SUBSEQUENT ENCOUNTER: Primary | ICD-10-CM

## 2023-11-02 PROCEDURE — 99999 PR PBB SHADOW E&M-EST. PATIENT-LVL III: ICD-10-PCS | Mod: PBBFAC,,, | Performed by: PHYSICIAN ASSISTANT

## 2023-11-02 PROCEDURE — 99213 PR OFFICE/OUTPT VISIT, EST, LEVL III, 20-29 MIN: ICD-10-PCS | Mod: S$GLB,,, | Performed by: PHYSICIAN ASSISTANT

## 2023-11-02 PROCEDURE — 73030 X-RAY EXAM OF SHOULDER: CPT | Mod: TC,RT

## 2023-11-02 PROCEDURE — 73030 XR SHOULDER COMPLETE 2 OR MORE VIEWS RIGHT: ICD-10-PCS | Mod: 26,RT,, | Performed by: INTERNAL MEDICINE

## 2023-11-02 PROCEDURE — 1160F RVW MEDS BY RX/DR IN RCRD: CPT | Mod: CPTII,S$GLB,, | Performed by: PHYSICIAN ASSISTANT

## 2023-11-02 PROCEDURE — 1160F PR REVIEW ALL MEDS BY PRESCRIBER/CLIN PHARMACIST DOCUMENTED: ICD-10-PCS | Mod: CPTII,S$GLB,, | Performed by: PHYSICIAN ASSISTANT

## 2023-11-02 PROCEDURE — 1159F MED LIST DOCD IN RCRD: CPT | Mod: CPTII,S$GLB,, | Performed by: PHYSICIAN ASSISTANT

## 2023-11-02 PROCEDURE — 3044F PR MOST RECENT HEMOGLOBIN A1C LEVEL <7.0%: ICD-10-PCS | Mod: CPTII,S$GLB,, | Performed by: PHYSICIAN ASSISTANT

## 2023-11-02 PROCEDURE — 99999 PR PBB SHADOW E&M-EST. PATIENT-LVL III: CPT | Mod: PBBFAC,,, | Performed by: PHYSICIAN ASSISTANT

## 2023-11-02 PROCEDURE — 3044F HG A1C LEVEL LT 7.0%: CPT | Mod: CPTII,S$GLB,, | Performed by: PHYSICIAN ASSISTANT

## 2023-11-02 PROCEDURE — 1125F PR PAIN SEVERITY QUANTIFIED, PAIN PRESENT: ICD-10-PCS | Mod: CPTII,S$GLB,, | Performed by: PHYSICIAN ASSISTANT

## 2023-11-02 PROCEDURE — 1125F AMNT PAIN NOTED PAIN PRSNT: CPT | Mod: CPTII,S$GLB,, | Performed by: PHYSICIAN ASSISTANT

## 2023-11-02 PROCEDURE — 99213 OFFICE O/P EST LOW 20 MIN: CPT | Mod: S$GLB,,, | Performed by: PHYSICIAN ASSISTANT

## 2023-11-02 PROCEDURE — 1159F PR MEDICATION LIST DOCUMENTED IN MEDICAL RECORD: ICD-10-PCS | Mod: CPTII,S$GLB,, | Performed by: PHYSICIAN ASSISTANT

## 2023-11-02 PROCEDURE — 73030 X-RAY EXAM OF SHOULDER: CPT | Mod: 26,RT,, | Performed by: INTERNAL MEDICINE

## 2023-11-02 NOTE — PROGRESS NOTES
Patient ID: Tej Purdy is a 68 y.o. male.    Chief Complaint: Pain of the Right Shoulder      HISTORY:  Tej Purdy is a 68 y.o. male who returns to me today for follow up of right shoulder proximal humerus fracture (DOI 10/10/23).  Today he reports some improvement.  Overall, his pain is better.  He is coming out of the sling.  No numbness or tingling.       PMH/PSH/FamHx/SocHx:    Unchanged from prior visit.    ROS:  Constitution: Negative for chills, fever and weakness.   Respiratory: Negative for cough and shortness of breath.   Musculoskeletal: Positive for right shoulder  Psychiatric/Behavioral: The patient is not nervous/anxious.       PHYSICAL EXAM:   Right shoulder  Skin intact  Resolving ecchymosis  ROM testing deferred  FROM in elbow, wrist, fingers    IMAGING: X-rays of the right shoulder, personally reviewed by me, demonstrate stable healing proximal humerus fracture.    ASSESSMENT/PLAN:    Tej was seen today for pain.    Diagnoses and all orders for this visit:    Other closed nondisplaced fracture of proximal end of right humerus with routine healing, subsequent encounter  -     X-ray Shoulder 2 or More Views Right; Future  -     Ambulatory referral/consult to Physical/Occupational Therapy; Future    - Begin pendulums, PROM as tolerated  - Remain NWB  - PT referral  - D/c sling  - Refill of oxycodone- begin weaning off of this  - Follow up 4 weeks with x-rays right shoulder- 2V shoulder AP, SCAP-Y

## 2023-11-13 ENCOUNTER — LAB VISIT (OUTPATIENT)
Dept: LAB | Facility: HOSPITAL | Age: 68
End: 2023-11-13
Attending: INTERNAL MEDICINE
Payer: MEDICARE

## 2023-11-13 DIAGNOSIS — Z94.4 S/P LIVER TRANSPLANT: ICD-10-CM

## 2023-11-13 LAB
ALBUMIN SERPL BCP-MCNC: 3.7 G/DL (ref 3.5–5.2)
ALP SERPL-CCNC: 93 U/L (ref 55–135)
ALT SERPL W/O P-5'-P-CCNC: 14 U/L (ref 10–44)
ANION GAP SERPL CALC-SCNC: 9 MMOL/L (ref 8–16)
ANISOCYTOSIS BLD QL SMEAR: SLIGHT
AST SERPL-CCNC: 20 U/L (ref 10–40)
BASOPHILS # BLD AUTO: 0.07 K/UL (ref 0–0.2)
BASOPHILS NFR BLD: 1.1 % (ref 0–1.9)
BILIRUB SERPL-MCNC: 0.4 MG/DL (ref 0.1–1)
BUN SERPL-MCNC: 17 MG/DL (ref 8–23)
CALCIUM SERPL-MCNC: 9 MG/DL (ref 8.7–10.5)
CHLORIDE SERPL-SCNC: 103 MMOL/L (ref 95–110)
CO2 SERPL-SCNC: 28 MMOL/L (ref 23–29)
CREAT SERPL-MCNC: 1 MG/DL (ref 0.5–1.4)
DACRYOCYTES BLD QL SMEAR: ABNORMAL
DIFFERENTIAL METHOD: ABNORMAL
EOSINOPHIL # BLD AUTO: 0.3 K/UL (ref 0–0.5)
EOSINOPHIL NFR BLD: 4.1 % (ref 0–8)
ERYTHROCYTE [DISTWIDTH] IN BLOOD BY AUTOMATED COUNT: 20.5 % (ref 11.5–14.5)
EST. GFR  (NO RACE VARIABLE): >60 ML/MIN/1.73 M^2
GLUCOSE SERPL-MCNC: 102 MG/DL (ref 70–110)
HCT VFR BLD AUTO: 37.7 % (ref 40–54)
HGB BLD-MCNC: 10.1 G/DL (ref 14–18)
HYPOCHROMIA BLD QL SMEAR: ABNORMAL
IMM GRANULOCYTES # BLD AUTO: 0.02 K/UL (ref 0–0.04)
IMM GRANULOCYTES NFR BLD AUTO: 0.3 % (ref 0–0.5)
LYMPHOCYTES # BLD AUTO: 1.3 K/UL (ref 1–4.8)
LYMPHOCYTES NFR BLD: 21.2 % (ref 18–48)
MCH RBC QN AUTO: 19.2 PG (ref 27–31)
MCHC RBC AUTO-ENTMCNC: 26.8 G/DL (ref 32–36)
MCV RBC AUTO: 72 FL (ref 82–98)
MONOCYTES # BLD AUTO: 0.8 K/UL (ref 0.3–1)
MONOCYTES NFR BLD: 12.5 % (ref 4–15)
NEUTROPHILS # BLD AUTO: 3.8 K/UL (ref 1.8–7.7)
NEUTROPHILS NFR BLD: 60.8 % (ref 38–73)
NRBC BLD-RTO: 0 /100 WBC
OVALOCYTES BLD QL SMEAR: ABNORMAL
PLATELET # BLD AUTO: 153 K/UL (ref 150–450)
PLATELET BLD QL SMEAR: ABNORMAL
PMV BLD AUTO: ABNORMAL FL (ref 9.2–12.9)
POIKILOCYTOSIS BLD QL SMEAR: SLIGHT
POTASSIUM SERPL-SCNC: 4.1 MMOL/L (ref 3.5–5.1)
PROT SERPL-MCNC: 7 G/DL (ref 6–8.4)
RBC # BLD AUTO: 5.26 M/UL (ref 4.6–6.2)
SODIUM SERPL-SCNC: 140 MMOL/L (ref 136–145)
SPHEROCYTES BLD QL SMEAR: ABNORMAL
WBC # BLD AUTO: 6.17 K/UL (ref 3.9–12.7)

## 2023-11-13 PROCEDURE — 85025 COMPLETE CBC W/AUTO DIFF WBC: CPT | Performed by: INTERNAL MEDICINE

## 2023-11-13 PROCEDURE — 80053 COMPREHEN METABOLIC PANEL: CPT | Performed by: INTERNAL MEDICINE

## 2023-11-13 PROCEDURE — 80197 ASSAY OF TACROLIMUS: CPT | Performed by: INTERNAL MEDICINE

## 2023-11-13 PROCEDURE — 36415 COLL VENOUS BLD VENIPUNCTURE: CPT | Mod: PN | Performed by: INTERNAL MEDICINE

## 2023-11-14 LAB — TACROLIMUS BLD-MCNC: 4.3 NG/ML (ref 5–15)

## 2023-11-16 ENCOUNTER — TELEPHONE (OUTPATIENT)
Dept: TRANSPLANT | Facility: CLINIC | Age: 68
End: 2023-11-16
Payer: MEDICARE

## 2023-11-16 ENCOUNTER — PATIENT MESSAGE (OUTPATIENT)
Dept: TRANSPLANT | Facility: CLINIC | Age: 68
End: 2023-11-16
Payer: MEDICARE

## 2023-11-16 NOTE — TELEPHONE ENCOUNTER
Sent patient message via portal to let him know labs are stable.  No medication changes, next labs due on 1/08/24    ----- Message from Thais Maxwell MD sent at 11/16/2023  9:01 AM CST -----  The Labs are stable - please let patient know.

## 2023-11-21 ENCOUNTER — PATIENT MESSAGE (OUTPATIENT)
Dept: ORTHOPEDICS | Facility: CLINIC | Age: 68
End: 2023-11-21
Payer: MEDICARE

## 2023-11-29 ENCOUNTER — PATIENT MESSAGE (OUTPATIENT)
Dept: ORTHOPEDICS | Facility: CLINIC | Age: 68
End: 2023-11-29
Payer: MEDICARE

## 2023-12-05 DIAGNOSIS — S42.294D OTHER CLOSED NONDISPLACED FRACTURE OF PROXIMAL END OF RIGHT HUMERUS WITH ROUTINE HEALING, SUBSEQUENT ENCOUNTER: Primary | ICD-10-CM

## 2023-12-10 DIAGNOSIS — F32.A DEPRESSION, UNSPECIFIED DEPRESSION TYPE: ICD-10-CM

## 2023-12-11 RX ORDER — QUETIAPINE FUMARATE 100 MG/1
100 TABLET, FILM COATED ORAL NIGHTLY
Qty: 30 TABLET | Refills: 2 | Status: SHIPPED | OUTPATIENT
Start: 2023-12-11 | End: 2024-02-14 | Stop reason: SDUPTHER

## 2023-12-21 DIAGNOSIS — F32.A DEPRESSION, UNSPECIFIED DEPRESSION TYPE: ICD-10-CM

## 2023-12-22 ENCOUNTER — HOSPITAL ENCOUNTER (INPATIENT)
Facility: HOSPITAL | Age: 68
LOS: 10 days | Discharge: HOME | DRG: 321 | End: 2024-01-02
Attending: EMERGENCY MEDICINE | Admitting: EMERGENCY MEDICINE
Payer: MEDICARE

## 2023-12-22 DIAGNOSIS — E87.70 HYPERVOLEMIA, UNSPECIFIED HYPERVOLEMIA TYPE: Primary | ICD-10-CM

## 2023-12-22 DIAGNOSIS — R52 PAIN: ICD-10-CM

## 2023-12-22 DIAGNOSIS — I48.91 A-FIB: ICD-10-CM

## 2023-12-22 DIAGNOSIS — Z94.4 S/P LIVER TRANSPLANT: ICD-10-CM

## 2023-12-22 DIAGNOSIS — R53.83 FATIGUE: ICD-10-CM

## 2023-12-22 DIAGNOSIS — I50.20 HFREF (HEART FAILURE WITH REDUCED EJECTION FRACTION): ICD-10-CM

## 2023-12-22 DIAGNOSIS — I35.0 AORTIC STENOSIS: ICD-10-CM

## 2023-12-22 DIAGNOSIS — Z95.5 PRESENCE OF DRUG-ELUTING STENT IN LEFT CIRCUMFLEX CORONARY ARTERY: ICD-10-CM

## 2023-12-22 DIAGNOSIS — R07.9 CHEST PAIN: ICD-10-CM

## 2023-12-22 DIAGNOSIS — I50.31 ACUTE DIASTOLIC (CONGESTIVE) HEART FAILURE: ICD-10-CM

## 2023-12-22 DIAGNOSIS — R06.02 SOB (SHORTNESS OF BREATH): ICD-10-CM

## 2023-12-22 DIAGNOSIS — R06.02 SHORTNESS OF BREATH: ICD-10-CM

## 2023-12-22 PROBLEM — N20.1 CALCULUS OF URETER: Status: RESOLVED | Noted: 2023-12-22 | Resolved: 2023-12-22

## 2023-12-22 PROBLEM — I10 PRIMARY HYPERTENSION: Chronic | Status: ACTIVE | Noted: 2020-11-23

## 2023-12-22 PROBLEM — C22.1 CHOLANGIOCARCINOMA: Chronic | Status: ACTIVE | Noted: 2022-03-16

## 2023-12-22 PROBLEM — D64.9 ANEMIA DUE TO UNKNOWN MECHANISM: Chronic | Status: ACTIVE | Noted: 2022-02-02

## 2023-12-22 PROBLEM — R18.8 OTHER ASCITES: Chronic | Status: ACTIVE | Noted: 2022-03-16

## 2023-12-22 PROBLEM — N20.1 CALCULUS OF URETER: Status: ACTIVE | Noted: 2023-12-22

## 2023-12-22 PROBLEM — R11.2 NAUSEA AND VOMITING: Status: RESOLVED | Noted: 2023-08-21 | Resolved: 2023-12-22

## 2023-12-22 PROBLEM — K35.80 ACUTE APPENDICITIS: Status: RESOLVED | Noted: 2023-06-02 | Resolved: 2023-12-22

## 2023-12-22 LAB
ALBUMIN SERPL BCP-MCNC: 3.7 G/DL (ref 3.5–5.2)
ALLENS TEST: ABNORMAL
ALP SERPL-CCNC: 75 U/L (ref 55–135)
ALT SERPL W/O P-5'-P-CCNC: 16 U/L (ref 10–44)
ANION GAP SERPL CALC-SCNC: 9 MMOL/L (ref 8–16)
AST SERPL-CCNC: 19 U/L (ref 10–40)
BACTERIA #/AREA URNS AUTO: NORMAL /HPF
BASOPHILS # BLD AUTO: 0.06 K/UL (ref 0–0.2)
BASOPHILS NFR BLD: 0.9 % (ref 0–1.9)
BILIRUB SERPL-MCNC: 1 MG/DL (ref 0.1–1)
BILIRUB UR QL STRIP: NEGATIVE
BNP SERPL-MCNC: 539 PG/ML (ref 0–99)
BUN SERPL-MCNC: 19 MG/DL (ref 8–23)
CALCIUM SERPL-MCNC: 8.9 MG/DL (ref 8.7–10.5)
CHLORIDE SERPL-SCNC: 106 MMOL/L (ref 95–110)
CLARITY UR REFRACT.AUTO: CLEAR
CO2 SERPL-SCNC: 24 MMOL/L (ref 23–29)
COLOR UR AUTO: YELLOW
CREAT SERPL-MCNC: 1.1 MG/DL (ref 0.5–1.4)
DIFFERENTIAL METHOD BLD: ABNORMAL
EOSINOPHIL # BLD AUTO: 0.1 K/UL (ref 0–0.5)
EOSINOPHIL NFR BLD: 1.7 % (ref 0–8)
ERYTHROCYTE [DISTWIDTH] IN BLOOD BY AUTOMATED COUNT: 20.4 % (ref 11.5–14.5)
EST. GFR  (NO RACE VARIABLE): >60 ML/MIN/1.73 M^2
GLUCOSE SERPL-MCNC: 185 MG/DL (ref 70–110)
GLUCOSE UR QL STRIP: NEGATIVE
HCO3 UR-SCNC: 24.5 MMOL/L (ref 24–28)
HCT VFR BLD AUTO: 35 % (ref 40–54)
HCV AB SERPL QL IA: NORMAL
HGB BLD-MCNC: 9.7 G/DL (ref 14–18)
HGB UR QL STRIP: NEGATIVE
HIV 1+2 AB+HIV1 P24 AG SERPL QL IA: NORMAL
HYALINE CASTS UR QL AUTO: 0 /LPF
IMM GRANULOCYTES # BLD AUTO: 0.02 K/UL (ref 0–0.04)
IMM GRANULOCYTES NFR BLD AUTO: 0.3 % (ref 0–0.5)
INFLUENZA A, MOLECULAR: NOT DETECTED
INFLUENZA B, MOLECULAR: NOT DETECTED
KETONES UR QL STRIP: NEGATIVE
LEUKOCYTE ESTERASE UR QL STRIP: NEGATIVE
LYMPHOCYTES # BLD AUTO: 1 K/UL (ref 1–4.8)
LYMPHOCYTES NFR BLD: 14.9 % (ref 18–48)
MCH RBC QN AUTO: 19.1 PG (ref 27–31)
MCHC RBC AUTO-ENTMCNC: 27.7 G/DL (ref 32–36)
MCV RBC AUTO: 69 FL (ref 82–98)
MICROSCOPIC COMMENT: NORMAL
MONOCYTES # BLD AUTO: 0.8 K/UL (ref 0.3–1)
MONOCYTES NFR BLD: 12.9 % (ref 4–15)
NEUTROPHILS # BLD AUTO: 4.4 K/UL (ref 1.8–7.7)
NEUTROPHILS NFR BLD: 69.3 % (ref 38–73)
NITRITE UR QL STRIP: NEGATIVE
NRBC BLD-RTO: 0 /100 WBC
PCO2 BLDA: 39.8 MMHG (ref 35–45)
PH SMN: 7.4 [PH] (ref 7.35–7.45)
PH UR STRIP: 7 [PH] (ref 5–8)
PLATELET # BLD AUTO: 152 K/UL (ref 150–450)
PMV BLD AUTO: ABNORMAL FL (ref 9.2–12.9)
PO2 BLDA: 35 MMHG (ref 40–60)
POC BE: 0 MMOL/L
POC SATURATED O2: 67 % (ref 95–100)
POC TCO2: 26 MMOL/L (ref 24–29)
POCT GLUCOSE: 189 MG/DL (ref 70–110)
POTASSIUM SERPL-SCNC: 4.4 MMOL/L (ref 3.5–5.1)
PROT SERPL-MCNC: 7.2 G/DL (ref 6–8.4)
PROT UR QL STRIP: ABNORMAL
RBC # BLD AUTO: 5.08 M/UL (ref 4.6–6.2)
RBC #/AREA URNS AUTO: 0 /HPF (ref 0–4)
RSV AG BY MOLECULAR METHOD: NOT DETECTED
SAMPLE: ABNORMAL
SARS-COV-2 RNA RESP QL NAA+PROBE: NOT DETECTED
SITE: ABNORMAL
SODIUM SERPL-SCNC: 139 MMOL/L (ref 136–145)
SP GR UR STRIP: 1.02 (ref 1–1.03)
SQUAMOUS #/AREA URNS AUTO: 0 /HPF
TROPONIN I SERPL DL<=0.01 NG/ML-MCNC: 0.01 NG/ML (ref 0–0.03)
TROPONIN I SERPL DL<=0.01 NG/ML-MCNC: 0.02 NG/ML (ref 0–0.03)
TSH SERPL DL<=0.005 MIU/L-ACNC: 2.22 UIU/ML (ref 0.4–4)
URN SPEC COLLECT METH UR: ABNORMAL
WBC # BLD AUTO: 6.36 K/UL (ref 3.9–12.7)
WBC #/AREA URNS AUTO: 0 /HPF (ref 0–5)

## 2023-12-22 PROCEDURE — 80053 COMPREHEN METABOLIC PANEL: CPT

## 2023-12-22 PROCEDURE — G0378 HOSPITAL OBSERVATION PER HR: HCPCS

## 2023-12-22 PROCEDURE — 83880 ASSAY OF NATRIURETIC PEPTIDE: CPT

## 2023-12-22 PROCEDURE — 84484 ASSAY OF TROPONIN QUANT: CPT | Mod: 91

## 2023-12-22 PROCEDURE — 84443 ASSAY THYROID STIM HORMONE: CPT

## 2023-12-22 PROCEDURE — 93010 ELECTROCARDIOGRAM REPORT: CPT | Mod: 76,,, | Performed by: INTERNAL MEDICINE

## 2023-12-22 PROCEDURE — 93010 ELECTROCARDIOGRAM REPORT: CPT | Mod: ,,, | Performed by: INTERNAL MEDICINE

## 2023-12-22 PROCEDURE — 96374 THER/PROPH/DIAG INJ IV PUSH: CPT

## 2023-12-22 PROCEDURE — 82962 GLUCOSE BLOOD TEST: CPT

## 2023-12-22 PROCEDURE — 85025 COMPLETE CBC W/AUTO DIFF WBC: CPT

## 2023-12-22 PROCEDURE — 63600175 PHARM REV CODE 636 W HCPCS

## 2023-12-22 PROCEDURE — 87389 HIV-1 AG W/HIV-1&-2 AB AG IA: CPT | Performed by: EMERGENCY MEDICINE

## 2023-12-22 PROCEDURE — 93005 ELECTROCARDIOGRAM TRACING: CPT

## 2023-12-22 PROCEDURE — 86803 HEPATITIS C AB TEST: CPT | Performed by: EMERGENCY MEDICINE

## 2023-12-22 PROCEDURE — 81001 URINALYSIS AUTO W/SCOPE: CPT

## 2023-12-22 PROCEDURE — 0241U SARS-COV2 (COVID) WITH FLU/RSV BY PCR: CPT

## 2023-12-22 PROCEDURE — 99285 EMERGENCY DEPT VISIT HI MDM: CPT | Mod: 25

## 2023-12-22 RX ORDER — FUROSEMIDE 10 MG/ML
40 INJECTION INTRAMUSCULAR; INTRAVENOUS
Status: COMPLETED | OUTPATIENT
Start: 2023-12-22 | End: 2023-12-22

## 2023-12-22 RX ADMIN — FUROSEMIDE 40 MG: 10 INJECTION, SOLUTION INTRAVENOUS at 08:12

## 2023-12-22 NOTE — Clinical Note
The catheter was inserted into the ostium   left main. An angiography was performed of the left coronary arteries. Multiple views were taken.  And was removed.

## 2023-12-22 NOTE — Clinical Note
The catheter was repositioned into the ostium   right coronary artery. An angiography was performed of the right coronary arteries. Multiple views were taken.  And was removed.

## 2023-12-22 NOTE — Clinical Note
Diagnosis: Hypervolemia, unspecified hypervolemia type [[id number]]   Future Attending Provider: JESSICA WEST [[id number]]   Admitting Provider:: DIO ARRIAGA [[id number]]

## 2023-12-23 PROBLEM — I25.10 CORONARY ARTERY DISEASE INVOLVING NATIVE CORONARY ARTERY OF NATIVE HEART WITHOUT ANGINA PECTORIS: Chronic | Status: ACTIVE | Noted: 2021-01-25

## 2023-12-23 PROBLEM — I50.31 ACUTE DIASTOLIC (CONGESTIVE) HEART FAILURE: Status: ACTIVE | Noted: 2023-12-22

## 2023-12-23 PROBLEM — Z79.4 TYPE 2 DIABETES MELLITUS, WITH LONG-TERM CURRENT USE OF INSULIN: Status: ACTIVE | Noted: 2021-01-19

## 2023-12-23 LAB
ANION GAP SERPL CALC-SCNC: 10 MMOL/L (ref 8–16)
ASCENDING AORTA: 3.47 CM
AV INDEX (PROSTH): 0.21
AV MEAN GRADIENT: 28 MMHG
AV PEAK GRADIENT: 42 MMHG
AV VALVE AREA BY VELOCITY RATIO: 0.92 CM²
AV VALVE AREA: 0.97 CM²
AV VELOCITY RATIO: 0.2
BASOPHILS # BLD AUTO: 0.04 K/UL (ref 0–0.2)
BASOPHILS NFR BLD: 0.8 % (ref 0–1.9)
BSA FOR ECHO PROCEDURE: 2.25 M2
BUN SERPL-MCNC: 18 MG/DL (ref 8–23)
CALCIUM SERPL-MCNC: 8.7 MG/DL (ref 8.7–10.5)
CHLORIDE SERPL-SCNC: 105 MMOL/L (ref 95–110)
CO2 SERPL-SCNC: 25 MMOL/L (ref 23–29)
CREAT SERPL-MCNC: 1.2 MG/DL (ref 0.5–1.4)
CREAT UR-MCNC: 99 MG/DL (ref 23–375)
CV ECHO LV RWT: 0.47 CM
DIFFERENTIAL METHOD BLD: ABNORMAL
DOP CALC AO PEAK VEL: 3.24 M/S
DOP CALC AO VTI: 64.65 CM
DOP CALC LVOT AREA: 4.5 CM2
DOP CALC LVOT DIAMETER: 2.4 CM
DOP CALC LVOT PEAK VEL: 0.66 M/S
DOP CALC LVOT STROKE VOLUME: 62.49 CM3
DOP CALCLVOT PEAK VEL VTI: 13.82 CM
E/E' RATIO: 10.57 M/S
ECHO LV POSTERIOR WALL: 1.14 CM (ref 0.6–1.1)
EOSINOPHIL # BLD AUTO: 0.1 K/UL (ref 0–0.5)
EOSINOPHIL NFR BLD: 2.1 % (ref 0–8)
ERYTHROCYTE [DISTWIDTH] IN BLOOD BY AUTOMATED COUNT: 19.9 % (ref 11.5–14.5)
EST. GFR  (NO RACE VARIABLE): >60 ML/MIN/1.73 M^2
ESTIMATED AVG GLUCOSE: 140 MG/DL (ref 68–131)
FERRITIN SERPL-MCNC: 15 NG/ML (ref 20–300)
FOLATE SERPL-MCNC: 15 NG/ML (ref 4–24)
FRACTIONAL SHORTENING: 37 % (ref 28–44)
GLUCOSE SERPL-MCNC: 173 MG/DL (ref 70–110)
HBA1C MFR BLD: 6.5 % (ref 4–5.6)
HCT VFR BLD AUTO: 31.2 % (ref 40–54)
HGB BLD-MCNC: 8.7 G/DL (ref 14–18)
IMM GRANULOCYTES # BLD AUTO: 0.02 K/UL (ref 0–0.04)
IMM GRANULOCYTES NFR BLD AUTO: 0.4 % (ref 0–0.5)
INTERVENTRICULAR SEPTUM: 1.11 CM (ref 0.6–1.1)
IRON SERPL-MCNC: 18 UG/DL (ref 45–160)
IVRT: 97.05 MSEC
LA MAJOR: 7.04 CM
LA MINOR: 6.36 CM
LA WIDTH: 5.17 CM
LEFT ATRIUM SIZE: 5 CM
LEFT ATRIUM VOLUME INDEX MOD: 54.6 ML/M2
LEFT ATRIUM VOLUME INDEX: 66.1 ML/M2
LEFT ATRIUM VOLUME MOD: 121.15 CM3
LEFT ATRIUM VOLUME: 146.84 CM3
LEFT INTERNAL DIMENSION IN SYSTOLE: 3.07 CM (ref 2.1–4)
LEFT VENTRICLE DIASTOLIC VOLUME INDEX: 49.77 ML/M2
LEFT VENTRICLE DIASTOLIC VOLUME: 110.48 ML
LEFT VENTRICLE MASS INDEX: 92 G/M2
LEFT VENTRICLE SYSTOLIC VOLUME INDEX: 16.7 ML/M2
LEFT VENTRICLE SYSTOLIC VOLUME: 37.15 ML
LEFT VENTRICULAR INTERNAL DIMENSION IN DIASTOLE: 4.86 CM (ref 3.5–6)
LEFT VENTRICULAR MASS: 204.14 G
LV LATERAL E/E' RATIO: 7.4 M/S
LV SEPTAL E/E' RATIO: 18.5 M/S
LYMPHOCYTES # BLD AUTO: 1.1 K/UL (ref 1–4.8)
LYMPHOCYTES NFR BLD: 20.4 % (ref 18–48)
MAGNESIUM SERPL-MCNC: 1.4 MG/DL (ref 1.6–2.6)
MCH RBC QN AUTO: 19 PG (ref 27–31)
MCHC RBC AUTO-ENTMCNC: 27.9 G/DL (ref 32–36)
MCV RBC AUTO: 68 FL (ref 82–98)
MONOCYTES # BLD AUTO: 0.7 K/UL (ref 0.3–1)
MONOCYTES NFR BLD: 12.8 % (ref 4–15)
MV PEAK E VEL: 0.74 M/S
NEUTROPHILS # BLD AUTO: 3.3 K/UL (ref 1.8–7.7)
NEUTROPHILS NFR BLD: 63.5 % (ref 38–73)
NRBC BLD-RTO: 0 /100 WBC
PHOSPHATE SERPL-MCNC: 3.3 MG/DL (ref 2.7–4.5)
PISA TR MAX VEL: 2.94 M/S
PLATELET # BLD AUTO: 119 K/UL (ref 150–450)
PMV BLD AUTO: ABNORMAL FL (ref 9.2–12.9)
POCT GLUCOSE: 127 MG/DL (ref 70–110)
POCT GLUCOSE: 131 MG/DL (ref 70–110)
POCT GLUCOSE: 151 MG/DL (ref 70–110)
POCT GLUCOSE: 167 MG/DL (ref 70–110)
POTASSIUM SERPL-SCNC: 4.1 MMOL/L (ref 3.5–5.1)
PROT UR-MCNC: <7 MG/DL (ref 0–15)
PROT/CREAT UR: NORMAL MG/G{CREAT} (ref 0–0.2)
RA MAJOR: 5.89 CM
RA WIDTH: 4.01 CM
RBC # BLD AUTO: 4.57 M/UL (ref 4.6–6.2)
RETICS/RBC NFR AUTO: 2 % (ref 0.4–2)
RIGHT VENTRICULAR END-DIASTOLIC DIMENSION: 3.78 CM
SATURATED IRON: 4 % (ref 20–50)
SINUS: 3.23 CM
SODIUM SERPL-SCNC: 140 MMOL/L (ref 136–145)
STJ: 2.23 CM
TACROLIMUS BLD-MCNC: 11.4 NG/ML (ref 5–15)
TDI LATERAL: 0.1 M/S
TDI SEPTAL: 0.04 M/S
TDI: 0.07 M/S
TOTAL IRON BINDING CAPACITY: 511 UG/DL (ref 250–450)
TR MAX PG: 35 MMHG
TRANSFERRIN SERPL-MCNC: 345 MG/DL (ref 200–375)
TRICUSPID ANNULAR PLANE SYSTOLIC EXCURSION: 1.5 CM
VIT B12 SERPL-MCNC: 412 PG/ML (ref 210–950)
WBC # BLD AUTO: 5.25 K/UL (ref 3.9–12.7)
Z-SCORE OF LEFT VENTRICULAR DIMENSION IN END DIASTOLE: -4.67
Z-SCORE OF LEFT VENTRICULAR DIMENSION IN END SYSTOLE: -3.37

## 2023-12-23 PROCEDURE — 25000003 PHARM REV CODE 250: Performed by: HOSPITALIST

## 2023-12-23 PROCEDURE — 80197 ASSAY OF TACROLIMUS: CPT | Performed by: HOSPITALIST

## 2023-12-23 PROCEDURE — 63600175 PHARM REV CODE 636 W HCPCS: Performed by: HOSPITALIST

## 2023-12-23 PROCEDURE — 85045 AUTOMATED RETICULOCYTE COUNT: CPT | Performed by: HOSPITALIST

## 2023-12-23 PROCEDURE — 84100 ASSAY OF PHOSPHORUS: CPT | Performed by: HOSPITALIST

## 2023-12-23 PROCEDURE — 82728 ASSAY OF FERRITIN: CPT | Performed by: HOSPITALIST

## 2023-12-23 PROCEDURE — 84156 ASSAY OF PROTEIN URINE: CPT | Performed by: HOSPITALIST

## 2023-12-23 PROCEDURE — 82746 ASSAY OF FOLIC ACID SERUM: CPT | Performed by: HOSPITALIST

## 2023-12-23 PROCEDURE — 83036 HEMOGLOBIN GLYCOSYLATED A1C: CPT | Performed by: HOSPITALIST

## 2023-12-23 PROCEDURE — 36415 COLL VENOUS BLD VENIPUNCTURE: CPT | Performed by: HOSPITALIST

## 2023-12-23 PROCEDURE — 96372 THER/PROPH/DIAG INJ SC/IM: CPT | Performed by: HOSPITALIST

## 2023-12-23 PROCEDURE — 63600175 PHARM REV CODE 636 W HCPCS: Performed by: STUDENT IN AN ORGANIZED HEALTH CARE EDUCATION/TRAINING PROGRAM

## 2023-12-23 PROCEDURE — 80048 BASIC METABOLIC PNL TOTAL CA: CPT | Performed by: HOSPITALIST

## 2023-12-23 PROCEDURE — 99223 1ST HOSP IP/OBS HIGH 75: CPT | Mod: GC,,, | Performed by: INTERNAL MEDICINE

## 2023-12-23 PROCEDURE — 83540 ASSAY OF IRON: CPT | Performed by: HOSPITALIST

## 2023-12-23 PROCEDURE — 85025 COMPLETE CBC W/AUTO DIFF WBC: CPT | Performed by: HOSPITALIST

## 2023-12-23 PROCEDURE — 82607 VITAMIN B-12: CPT | Performed by: HOSPITALIST

## 2023-12-23 PROCEDURE — 21400001 HC TELEMETRY ROOM

## 2023-12-23 PROCEDURE — 83735 ASSAY OF MAGNESIUM: CPT | Performed by: HOSPITALIST

## 2023-12-23 RX ORDER — GABAPENTIN 300 MG/1
300 CAPSULE ORAL 3 TIMES DAILY PRN
Status: DISCONTINUED | OUTPATIENT
Start: 2023-12-23 | End: 2024-01-02 | Stop reason: HOSPADM

## 2023-12-23 RX ORDER — GLUCAGON 1 MG
1 KIT INJECTION
Status: DISCONTINUED | OUTPATIENT
Start: 2023-12-23 | End: 2024-01-02 | Stop reason: HOSPADM

## 2023-12-23 RX ORDER — MAGNESIUM SULFATE HEPTAHYDRATE 40 MG/ML
2 INJECTION, SOLUTION INTRAVENOUS ONCE
Status: COMPLETED | OUTPATIENT
Start: 2023-12-23 | End: 2023-12-23

## 2023-12-23 RX ORDER — INSULIN ASPART 100 [IU]/ML
4 INJECTION, SOLUTION INTRAVENOUS; SUBCUTANEOUS
Status: DISCONTINUED | OUTPATIENT
Start: 2023-12-23 | End: 2024-01-02 | Stop reason: HOSPADM

## 2023-12-23 RX ORDER — IBUPROFEN 200 MG
24 TABLET ORAL
Status: DISCONTINUED | OUTPATIENT
Start: 2023-12-23 | End: 2024-01-02 | Stop reason: HOSPADM

## 2023-12-23 RX ORDER — QUETIAPINE FUMARATE 25 MG/1
100 TABLET, FILM COATED ORAL NIGHTLY
Status: DISCONTINUED | OUTPATIENT
Start: 2023-12-23 | End: 2024-01-02 | Stop reason: HOSPADM

## 2023-12-23 RX ORDER — FUROSEMIDE 10 MG/ML
60 INJECTION INTRAMUSCULAR; INTRAVENOUS DAILY
Status: DISCONTINUED | OUTPATIENT
Start: 2023-12-23 | End: 2023-12-25

## 2023-12-23 RX ORDER — PROCHLORPERAZINE EDISYLATE 5 MG/ML
5 INJECTION INTRAMUSCULAR; INTRAVENOUS EVERY 6 HOURS PRN
Status: DISCONTINUED | OUTPATIENT
Start: 2023-12-23 | End: 2024-01-02 | Stop reason: HOSPADM

## 2023-12-23 RX ORDER — OMEGA-3/DHA/EPA/FISH OIL 300-1000MG
1 CAPSULE,DELAYED RELEASE (ENTERIC COATED) ORAL DAILY
Status: DISCONTINUED | OUTPATIENT
Start: 2023-12-23 | End: 2024-01-02 | Stop reason: HOSPADM

## 2023-12-23 RX ORDER — PANTOPRAZOLE SODIUM 40 MG/1
40 TABLET, DELAYED RELEASE ORAL DAILY
Status: DISCONTINUED | OUTPATIENT
Start: 2023-12-23 | End: 2024-01-02 | Stop reason: HOSPADM

## 2023-12-23 RX ORDER — TALC
6 POWDER (GRAM) TOPICAL NIGHTLY PRN
Status: DISCONTINUED | OUTPATIENT
Start: 2023-12-23 | End: 2024-01-02 | Stop reason: HOSPADM

## 2023-12-23 RX ORDER — INSULIN ASPART 100 [IU]/ML
0-5 INJECTION, SOLUTION INTRAVENOUS; SUBCUTANEOUS
Status: DISCONTINUED | OUTPATIENT
Start: 2023-12-23 | End: 2024-01-02 | Stop reason: HOSPADM

## 2023-12-23 RX ORDER — TACROLIMUS 1 MG/1
1 CAPSULE ORAL 2 TIMES DAILY
Status: DISCONTINUED | OUTPATIENT
Start: 2023-12-23 | End: 2023-12-24

## 2023-12-23 RX ORDER — IBUPROFEN 200 MG
16 TABLET ORAL
Status: DISCONTINUED | OUTPATIENT
Start: 2023-12-23 | End: 2024-01-02 | Stop reason: HOSPADM

## 2023-12-23 RX ORDER — ONDANSETRON 2 MG/ML
4 INJECTION INTRAMUSCULAR; INTRAVENOUS EVERY 8 HOURS PRN
Status: DISCONTINUED | OUTPATIENT
Start: 2023-12-23 | End: 2024-01-02 | Stop reason: HOSPADM

## 2023-12-23 RX ORDER — AMLODIPINE BESYLATE 10 MG/1
10 TABLET ORAL DAILY
Status: DISCONTINUED | OUTPATIENT
Start: 2023-12-23 | End: 2023-12-25

## 2023-12-23 RX ORDER — POLYETHYLENE GLYCOL 3350 17 G/17G
17 POWDER, FOR SOLUTION ORAL 2 TIMES DAILY PRN
Status: DISCONTINUED | OUTPATIENT
Start: 2023-12-23 | End: 2024-01-02 | Stop reason: HOSPADM

## 2023-12-23 RX ORDER — SODIUM CHLORIDE 0.9 % (FLUSH) 0.9 %
10 SYRINGE (ML) INJECTION EVERY 6 HOURS PRN
Status: DISCONTINUED | OUTPATIENT
Start: 2023-12-23 | End: 2024-01-02 | Stop reason: HOSPADM

## 2023-12-23 RX ORDER — NALOXONE HCL 0.4 MG/ML
0.02 VIAL (ML) INJECTION
Status: DISCONTINUED | OUTPATIENT
Start: 2023-12-23 | End: 2024-01-02 | Stop reason: HOSPADM

## 2023-12-23 RX ORDER — LANOLIN ALCOHOL/MO/W.PET/CERES
800 CREAM (GRAM) TOPICAL 3 TIMES DAILY
Status: DISCONTINUED | OUTPATIENT
Start: 2023-12-23 | End: 2024-01-02 | Stop reason: HOSPADM

## 2023-12-23 RX ORDER — ACETAMINOPHEN 325 MG/1
650 TABLET ORAL EVERY 4 HOURS PRN
Status: DISCONTINUED | OUTPATIENT
Start: 2023-12-23 | End: 2024-01-02 | Stop reason: HOSPADM

## 2023-12-23 RX ORDER — BUPROPION HYDROCHLORIDE 150 MG/1
150 TABLET ORAL EVERY MORNING
Status: DISCONTINUED | OUTPATIENT
Start: 2023-12-23 | End: 2024-01-02 | Stop reason: HOSPADM

## 2023-12-23 RX ORDER — SERTRALINE HYDROCHLORIDE 25 MG/1
25 TABLET, FILM COATED ORAL DAILY
Status: DISCONTINUED | OUTPATIENT
Start: 2023-12-23 | End: 2024-01-02 | Stop reason: HOSPADM

## 2023-12-23 RX ADMIN — Medication 800 MG: at 04:12

## 2023-12-23 RX ADMIN — Medication 800 MG: at 09:12

## 2023-12-23 RX ADMIN — TACROLIMUS 1 MG: 1 CAPSULE ORAL at 06:12

## 2023-12-23 RX ADMIN — QUETIAPINE FUMARATE 100 MG: 25 TABLET ORAL at 01:12

## 2023-12-23 RX ADMIN — BUPROPION HYDROCHLORIDE 150 MG: 150 TABLET, FILM COATED, EXTENDED RELEASE ORAL at 07:12

## 2023-12-23 RX ADMIN — FUROSEMIDE 60 MG: 10 INJECTION, SOLUTION INTRAMUSCULAR; INTRAVENOUS at 08:12

## 2023-12-23 RX ADMIN — QUETIAPINE FUMARATE 100 MG: 25 TABLET ORAL at 09:12

## 2023-12-23 RX ADMIN — APIXABAN 5 MG: 5 TABLET, FILM COATED ORAL at 01:12

## 2023-12-23 RX ADMIN — SERTRALINE HYDROCHLORIDE 25 MG: 25 TABLET ORAL at 08:12

## 2023-12-23 RX ADMIN — TACROLIMUS 1 MG: 1 CAPSULE ORAL at 08:12

## 2023-12-23 RX ADMIN — APIXABAN 5 MG: 5 TABLET, FILM COATED ORAL at 08:12

## 2023-12-23 RX ADMIN — APIXABAN 5 MG: 5 TABLET, FILM COATED ORAL at 09:12

## 2023-12-23 RX ADMIN — Medication 1 CAPSULE: at 08:12

## 2023-12-23 RX ADMIN — INSULIN ASPART 4 UNITS: 100 INJECTION, SOLUTION INTRAVENOUS; SUBCUTANEOUS at 04:12

## 2023-12-23 RX ADMIN — AMLODIPINE BESYLATE 10 MG: 10 TABLET ORAL at 08:12

## 2023-12-23 RX ADMIN — INSULIN ASPART 4 UNITS: 100 INJECTION, SOLUTION INTRAVENOUS; SUBCUTANEOUS at 12:12

## 2023-12-23 RX ADMIN — INSULIN DETEMIR 8 UNITS: 100 INJECTION, SOLUTION SUBCUTANEOUS at 08:12

## 2023-12-23 RX ADMIN — INSULIN DETEMIR 8 UNITS: 100 INJECTION, SOLUTION SUBCUTANEOUS at 09:12

## 2023-12-23 RX ADMIN — THERA TABS 1 TABLET: TAB at 08:12

## 2023-12-23 RX ADMIN — TACROLIMUS 1 MG: 1 CAPSULE ORAL at 01:12

## 2023-12-23 RX ADMIN — INSULIN DETEMIR 8 UNITS: 100 INJECTION, SOLUTION SUBCUTANEOUS at 01:12

## 2023-12-23 RX ADMIN — INSULIN ASPART 4 UNITS: 100 INJECTION, SOLUTION INTRAVENOUS; SUBCUTANEOUS at 08:12

## 2023-12-23 RX ADMIN — MAGNESIUM SULFATE HEPTAHYDRATE 2 G: 40 INJECTION, SOLUTION INTRAVENOUS at 09:12

## 2023-12-23 RX ADMIN — PANTOPRAZOLE SODIUM 40 MG: 40 TABLET, DELAYED RELEASE ORAL at 08:12

## 2023-12-23 NOTE — PLAN OF CARE
Problem: Adult Inpatient Plan of Care  Goal: Plan of Care Review  Outcome: Ongoing, Progressing  Flowsheets (Taken 12/23/2023 0418)  Plan of Care Reviewed With: patient  Goal: Patient-Specific Goal (Individualized)  Outcome: Ongoing, Progressing  Goal: Absence of Hospital-Acquired Illness or Injury  Outcome: Ongoing, Progressing  Intervention: Identify and Manage Fall Risk  Flowsheets (Taken 12/23/2023 0418)  Safety Promotion/Fall Prevention:   assistive device/personal item within reach   side rails raised x 2  Intervention: Prevent Skin Injury  Flowsheets (Taken 12/23/2023 0418)  Skin Protection: incontinence pads utilized  Intervention: Prevent and Manage VTE (Venous Thromboembolism) Risk  Flowsheets (Taken 12/23/2023 0418)  Activity Management: Ambulated to bathroom - L4  VTE Prevention/Management:   bleeding precations maintained   bleeding risk assessed  Range of Motion: active ROM (range of motion) encouraged  Intervention: Prevent Infection  Flowsheets (Taken 12/23/2023 0418)  Infection Prevention:   hand hygiene promoted   single patient room provided  Goal: Optimal Comfort and Wellbeing  Outcome: Ongoing, Progressing  Intervention: Monitor Pain and Promote Comfort  Flowsheets (Taken 12/23/2023 0418)  Pain Management Interventions: care clustered  Goal: Readiness for Transition of Care  Outcome: Ongoing, Progressing     Problem: Diabetes Comorbidity  Goal: Blood Glucose Level Within Targeted Range  Outcome: Ongoing, Progressing  Intervention: Monitor and Manage Glycemia  Flowsheets (Taken 12/23/2023 0418)  Glycemic Management: blood glucose monitored     Problem: Infection  Goal: Absence of Infection Signs and Symptoms  Outcome: Ongoing, Progressing     Problem: Fall Injury Risk  Goal: Absence of Fall and Fall-Related Injury  Outcome: Ongoing, Progressing  Intervention: Identify and Manage Contributors  Flowsheets (Taken 12/23/2023 0418)  Self-Care Promotion: independence encouraged  Medication  Review/Management: medications reviewed  Intervention: Promote Injury-Free Environment  Flowsheets (Taken 12/23/2023 6892)  Safety Promotion/Fall Prevention:   assistive device/personal item within reach   side rails raised x 2

## 2023-12-23 NOTE — ED NOTES
Received bedside report from NICHELLE Jones  Pt AAOx4, resting comfortably in bed, NAD, respirations E/UL, updated on POC, wheels locked and in low position, call bell with in reach, Comfort positioning and restroom needs were addressed. Necessary items were placed with in reach and was advised when a reassessment would take place.

## 2023-12-23 NOTE — HPI
Tej Purdy is a 68 year old white man with former smoking, (quit in 1980), marijuana use, hypertension, diabetes mellitus type 2 (treated with insulin) with neuropathy, hyperlipidemia, nonalcoholic steatohepatitis, history of cirrhosis, history of cholangiocarcinoma, history of liver transplant on 1/6/2022 (immunosuppressed on tacrolimus), persistent atrial fibrillation status post cardioversion on 5/19/2021, 5/31/2021, 7/7/2021, 7/27/2021 (anticoagulated on apixaban), aortic stenosis, heart failure with reduced ejection fraction, peripheral artery disease, anemia, history of skin cancer. He lives in [place], [place]. He is . His hepatologist is Dr. Thais Maxwell.               He presented to [facility] Emergency Department on 12/22/2023 with fatigue and progressive dyspnea over the last 3 days. He had dyspnea with minimal exertion (walking 2 to 3 feet) and orthopnea. He could not slep on his side and had been getting up and sitting in his recliner. He had difficulty sleeping resulting in fatigue from insomnia.              Chest X-ray showed patchy lung opacities. BNP was 539 pg/mL. He was given 40 mg of intravenous furosemide. He urinated 900 mL while in the emergency department. He was admitted to Hospital Medicine Team [facility]

## 2023-12-23 NOTE — ED NOTES
Past few days increased SOB, more so on exertion and when lying down, improves when sitting up. Cold like symptoms 2 weeks ago but sx had resolved. Also states chest tightness    Patient identifiers for Tej Purdy 68 y.o. male checked and correct.  Chief Complaint   Patient presents with    Fatigue     Worsens with exertion x 3 days, liver tx 2 years ago     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    Cirrhosis 11/23/2020    Diabetes mellitus, type 2     Diabetic neuropathy 11/24/2020    Disorder of kidney and ureter     HTN (hypertension) 11/23/2020    Hyperlipidemia 11/23/2020    Kidney stones 11/23/2020    S/p lithotripsy 2020    Leukocytosis 1/15/2021    Liver mass 11/23/2020    CASTILLO (nonalcoholic steatohepatitis) 11/23/2020    Nausea and vomiting 8/21/2023    Other ascites 3/16/2022    PAD (peripheral artery disease)     Portal hypertension 11/23/2020    Skin cancer 11/23/2020     Allergies reported:   Review of patient's allergies indicates:   Allergen Reactions    Bee pollens Swelling     BEE STINGS swells body       Appearance: Pt awake, alert & oriented to person, place & time. Pt in no acute distress at present time. Pt is clean and well groomed with clothes appropriately fastened.   Skin: Skin warm, dry & intact. Color consistent with ethnicity. Mucous membranes moist. No breakdown or brusing noted.   Musculoskeletal: Patient moving all extremities well, no obvious swelling or deformities noted.   Respiratory: Respirations spontaneous, even, and non-labored. Visible chest rise noted. Airway is open and patent. No accessory muscle use noted.   Neurologic: Sensation is intact. Speech is clear and appropriate. Eyes open spontaneously, behavior appropriate to situation, follows commands, facial expression symmetrical, bilateral hand grasp equal and even, purposeful motor response noted.  Cardiac: All peripheral pulses present. No Bilateral lower  extremity edema. Cap refill is <3 seconds.  Abdomen: Abdomen soft, non distended, non tender to palpation.   : Pt voids independently, denies dysuria, hematuria, frequency.

## 2023-12-23 NOTE — ASSESSMENT & PLAN NOTE
Patient with Persistent (7 days or more) atrial fibrillation which is controlled currently with Amiodarone. Patient is currently in atrial fibrillation.[id number] Score: 3. HASBLED Score: . Anticoagulation indicated. Anticoagulation done with apixaban .

## 2023-12-23 NOTE — ED PROVIDER NOTES
Encounter Date: 12/22/2023       History     Chief Complaint   Patient presents with    Fatigue     Worsens with exertion x 3 days, liver tx 2 years ago     Tej Purdy is a 68 y.o. male with PMH of atrial fibrillation on Eliquis, diabetes, Nevarez cirrhosis status post liver transplant, presenting to [facility] ED for shortness of breath. patient states that he has been fatigued over the last several days.  He has had progressively worsening shortness of breath with exertion over the last 3 days.  Denies any infectious symptoms such as fever, chills, recent nausea/vomiting, diarrhea.  Denies any lower extremity swelling.  Denies chest pain.      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    Cirrhosis 11/23/2020    Diabetes mellitus, type 2     Diabetic neuropathy 11/24/2020    Disorder of kidney and ureter     HTN (hypertension) 11/23/2020    Hyperlipidemia 11/23/2020    Kidney stones 11/23/2020    S/p lithotripsy 2020    Leukocytosis 1/15/2021    Liver mass 11/23/2020    NEVAREZ (nonalcoholic steatohepatitis) 11/23/2020    Nausea and vomiting 8/21/2023    Other ascites 3/16/2022    PAD (peripheral artery disease)     Portal hypertension 11/23/2020    Skin cancer 11/23/2020     Past Surgical History:   Procedure Laterality Date    COLONOSCOPY  10/2020    ESOPHAGEAL MANOMETRY WITH MEASUREMENT OF IMPEDANCE N/A 7/17/2023    Procedure: MANOMETRY, ESOPHAGUS, WITH IMPEDANCE MEASUREMENT;  Surgeon: Klarissa Zamora MD;  Location: [facility] ([address] [place] [address]);  Service: Gastroenterology;  Laterality: N/A;  pt diabetic  liver txp  prep insructions sent to pt via email and portal -    ESOPHAGOGASTRODUODENOSCOPY  10/2020    ESOPHAGOGASTRODUODENOSCOPY N/A 7/1/2021    Procedure: EGD (ESOPHAGOGASTRODUODENOSCOPY);  Surgeon: Jovan David MD;  Location: [facility] (4TH FLR);   Service: Endoscopy;  Laterality: N/A;  HCC. Listed for liver transplant. EGD for variceal surveillance.  cardiac clearance and blood thinenr approval received, see telephone encounter 6/23/21-BB  fully vaccinated-BB  labs same day of procedure-BB    EXCISION OF HYDROCELE Right     hemorroid surgery      hemorroidectomy      KIDNEY STONE SURGERY      LAPAROSCOPIC APPENDECTOMY N/A 6/2/2023    Procedure: APPENDECTOMY, LAPAROSCOPIC;  Surgeon: Shan Ross MD;  Location: [facility] FLR;  Service: General;  Laterality: N/A;    LEFT HEART CATHETERIZATION N/A 1/27/2021    Procedure: Left heart cath;  Surgeon: Kris Shelton MD;  Location: [facility] CATH LAB;  Service: Cardiology;  Laterality: N/A;    liver mass removal      LIVER TRANSPLANT N/A 1/6/2022    Procedure: TRANSPLANT, LIVER;  Surgeon: Lizet Garcia MD;  [facility];  Service: Transplant;  Laterality: N/A;    RIGHT HEART CATHETERIZATION Right 5/7/2021    Procedure: INSERTION, CATHETER, RIGHT HEART;  Surgeon: Jaden Schuster MD;  Location: [facility] CATH LAB;  Service: Cardiology;  Laterality: Right;    TREATMENT OF CARDIAC ARRHYTHMIA N/A 5/19/2021    Procedure: CARDIOVERSION;  Surgeon: Didier Tilley MD;  Location: [facility] EP LAB;  Service: Cardiology;  Laterality: N/A;  a fib, dccv/johny, mac, dm. sscu    TREATMENT OF CARDIAC ARRHYTHMIA N/A 5/31/2021    Procedure: CARDIOVERSION;  Surgeon: Daniel Arambula MD;  Location: [facility] EP LAB;  Service: Cardiology;  Laterality: N/A;  afib, DCCV, anest., DM, 3 prep    TREATMENT OF CARDIAC ARRHYTHMIA N/A 7/7/2021    Procedure: Cardioversion or Defibrillation;  Surgeon: Didier Tilley MD;  Location: [facility] EP LAB;  Service: Cardiology;  Laterality: N/A;  AF, JOHNY (cancel if complaint), DCCV, MAC, DM, 3 Prep    TREATMENT OF CARDIAC ARRHYTHMIA N/A 7/27/2021    Procedure: Cardioversion or Defibrillation;  Surgeon: Didier Tilley MD;  Location: [facility] EP LAB;  Service: Cardiology;  Laterality: N/A;  AF, JOHNY (cx if  complaint), DCCV, MAC, DM, 3 Prep     Family History   Problem Relation Age of Onset    Coronary artery disease Father     Colon cancer Neg Hx     Esophageal cancer Neg Hx      Social History     Tobacco Use    Smoking status: Former     Current packs/day: 0.00     Types: Cigarettes     Quit date: 1980     Years since quittin.9    Smokeless tobacco: Former   Substance Use Topics    Alcohol use: Not Currently    Drug use: Never     Review of Systems   Constitutional:  Negative for fever.   HENT:  Negative for congestion, rhinorrhea and sore throat.    Eyes:  Negative for visual disturbance.   Respiratory:  Positive for shortness of breath. Negative for cough.    Cardiovascular:  Negative for chest pain and leg swelling.   Gastrointestinal:  Positive for vomiting (1 week ago). Negative for abdominal pain, diarrhea and nausea.   Genitourinary:  Negative for dysuria and hematuria.   Neurological:  Negative for weakness.       Physical Exam     Initial Vitals [23 1723]   BP Pulse Resp Temp SpO2   135/84 103 16 97.7 °F (36.5 °C) 98 %      MAP       --         Physical Exam    Nursing note and vitals reviewed.  Constitutional: He appears well-developed and well-nourished. He is cooperative.  Non-toxic appearance. He does not appear ill.   HENT:   Head: Normocephalic and atraumatic.   Mouth/Throat: Mucous membranes are normal. Mucous membranes are not dry.   Eyes: Conjunctivae are normal. Pupils are equal, round, and reactive to light.   Neck: Trachea normal and phonation normal.   Cardiovascular:  Normal rate, regular rhythm, normal heart sounds, intact distal pulses and normal pulses.     Exam reveals no gallop, no S3, no S4 and no friction rub.       No murmur heard.  Pulmonary/Chest: Breath sounds normal. No respiratory distress. He has no wheezes. He has no rhonchi. He has no rales.   Abdominal: Abdomen is soft. He exhibits no distension. There is no abdominal tenderness.   Musculoskeletal:      Right  lower leg: No edema.      Left lower leg: No edema.     Neurological: He is alert.   Skin: Skin is warm, dry and intact. Capillary refill takes less than 2 seconds.   Psychiatric: He has a normal mood and affect. His speech is normal.         ED Course   Procedures  Labs Reviewed   CBC W/ AUTO DIFFERENTIAL - Abnormal; Notable for the following components:       Result Value    Hemoglobin 9.7 (*)     Hematocrit 35.0 (*)     MCV 69 (*)     MCH 19.1 (*)     MCHC 27.7 (*)     RDW 20.4 (*)     Lymph % 14.9 (*)     All other components within normal limits    Narrative:     Release to patient->Immediate   COMPREHENSIVE METABOLIC PANEL - Abnormal; Notable for the following components:    Glucose 185 (*)     All other components within normal limits    Narrative:     Release to patient->Immediate   URINALYSIS, REFLEX TO URINE CULTURE - Abnormal; Notable for the following components:    Protein, UA 1+ (*)     All other components within normal limits    Narrative:     Specimen Source->Urine   B-TYPE NATRIURETIC PEPTIDE - Abnormal; Notable for the following components:     (*)     All other components within normal limits    Narrative:     Release to patient->Immediate   POCT GLUCOSE - Abnormal; Notable for the following components:    POCT Glucose 189 (*)     All other components within normal limits   ISTAT PROCEDURE - Abnormal; Notable for the following components:    POC PO2 35 (*)     All other components within normal limits   HIV 1 / 2 ANTIBODY    Narrative:     Release to patient->Immediate   HEPATITIS C ANTIBODY    Narrative:     Release to patient->Immediate   TSH    Narrative:     Release to patient->Immediate   TROPONIN I    Narrative:     Release to patient->Immediate   SARS-COV2 (COVID) WITH FLU/RSV BY PCR   URINALYSIS MICROSCOPIC    Narrative:     Specimen Source->Urine   POCT GLUCOSE MONITORING CONTINUOUS          Imaging Results              X-Ray Chest AP Portable (Final result)  Result time 12/22/23  19:39:53      Final result by Stan Braun MD (12/22/23 19:39:53)                   Impression:      Bilateral mid to lower lung zone new patchy subtle opacities which may reflect subsegmental atelectasis versus multifocal airspace process including aspiration or pneumonia in the proper clinical setting.  Consider short-term follow-up chest radiography after therapy to ensure resolution.      Electronically signed by: Stan Braun MD  Date:    12/22/2023  Time:    19:39               Narrative:    EXAMINATION:  XR CHEST AP PORTABLE    CLINICAL HISTORY:  Shortness of breath    TECHNIQUE:  Single frontal view of the chest was performed.    COMPARISON:  Chest radiograph 06/01/2023, CT chest 09/18/2023    FINDINGS:  Monitoring leads overlie the chest.  Patient is rotated.  Resolution is limited by body habitus with underpenetration.    Cardiomediastinal silhouette is midline and prominent similar to prior.  Scattered linear opacities throughout the lungs consistent with minimal platelike scarring versus atelectasis.  Few scattered new subtle patchy subsegmental opacities at the bilateral mid to lower lung zones.  The lungs are otherwise well expanded without consolidation, pleural effusion or pneumothorax.  No acute osseous process seen.  PA and lateral views can be obtained.                                       Medications   furosemide injection 40 mg (40 mg Intravenous Given 12/22/23 2003)     Medical Decision Making  68-year-old male with history of Nevarez cirrhosis status post transplant, diabetes, aortic stenosis presenting for fatigue and dyspnea on exertion.  Initially, patient is hemodynamically stable and well-appearing.      Patient with loud systolic murmur on exam.  Patient has mildly distended abdomen, possibly sign of fluid overload.  Lungs are generally clear but patient possibly having progression of his aortic valve disease.  Will assess for anemia.  Patient has a smoking history will assess for  hypercarbia.  Will evaluate for thyroid disease.  Despite lack of infectious symptoms, will swab for COVID/flu.      Patient has elevation in BNP.  Chest x-ray showing patchy opacity in lower lung zones, atelectasis versus pulmonary edema.  Will diurese with 40 of IV Lasix.  Discussed patient's case with Hospital Medicine who agreed to admit patient to their service for further management.    Amount and/or Complexity of Data Reviewed  Independent Historian: cheng  External Data Reviewed: labs, radiology, ECG and notes.  Labs: ordered. Decision-making details documented in ED Course.  Radiology: ordered. Decision-making details documented in ED Course.    Risk  Prescription drug management.               ED Course as of 12/22/23 2103   Fri Dec 22, 2023   1917 BNP(!): 539 [ES]   1917 Hemoglobin(!): 9.7 [ES]   2026 SARS-CoV2 (COVID-19) Qualitative PCR: Not Detected [ES]   2026 Influenza A, Molecular: Not Detected [ES]   2026 Influenza B, Molecular: Not Detected [ES]   2026 RSV Ag by Molecular Method: Not Detected [ES]   2026 TSH: 2.223 [ES]   2026 Troponin I: 0.019 [ES]   2026 Comprehensive metabolic panel(!)  No significant electrolyte abnormalities [ES]   2026 POC PH: 7.396 [ES]   2026 POC PCO2: 39.8 [ES]   2026 POC HCO3: 24.5 [ES]   2027 Hemoglobin(!): 9.7  Microcytic anemia consistent with baseline. [ES]   2041 X-Ray Chest AP Portable  Bilateral mid to lower lung zone new patchy subtle opacities which may reflect subsegmental atelectasis versus multifocal airspace process including aspiration or pneumonia in the proper clinical setting.  Consider short-term follow-up chest radiography after therapy to ensure resolution. [ES]   2058 Troponin I: 0.019 [ES]      ED Course User Index  [ES] Lizet Chan MD                           Clinical Impression:  Final diagnoses:  [R53.83] Fatigue  [R06.02] Shortness of breath  [R06.02] SOB (shortness of breath)  [E87.70] Hypervolemia, unspecified hypervolemia type  (Primary)          ED Disposition Condition    Observation Stable                Lizet Chan MD  Resident  12/22/23 2764

## 2023-12-23 NOTE — NURSING
Nurses Note -- 4 Eyes      12/23/2023   4:21 AM      Skin assessed during: Admit      [x] No Altered Skin Integrity Present    []Prevention Measures Documented      [] Yes- Altered Skin Integrity Present or Discovered   [] LDA Added if Not in Epic (Describe Wound)   [] New Altered Skin Integrity was Present on Admit and Documented in LDA   [] Wound Image Taken    Wound Care Consulted? No    Attending Nurse:  Carla Lacey RN    Second RN/Staff Member:  JOSE Barrera

## 2023-12-23 NOTE — ASSESSMENT & PLAN NOTE
Patient's FSGs are controlled on current medication regimen.  Last A1c reviewed-   Lab Results   Component Value Date    HGBA1C 6.5 (H) 12/23/2023     Most recent fingerstick glucose reviewed-   Recent Labs   Lab 12/22/23  1842   POCTGLUCOSE 189*     Current correctional scale  Low  Maintain anti-hyperglycemic dose as follows-   Antihyperglycemics (From admission, onward)      Start     Stop Route Frequency Ordered    12/23/23 0715  insulin aspart U-100 pen 4 Units         -- SubQ 3 times daily with meals 12/23/23 0020    12/23/23 0120  insulin aspart U-100 pen 0-5 Units         -- SubQ Before meals & nightly PRN 12/23/23 0021    12/23/23 0030  insulin detemir U-100 (Levemir) pen 8 Units         -- SubQ 2 times daily 12/23/23 0020          Hold Oral hypoglycemics while patient is in the hospital.

## 2023-12-23 NOTE — SUBJECTIVE & OBJECTIVE
Past Medical History:   Diagnosis Date    Acute appendicitis 06/02/2023    Atrial fibrillation     Calculus of ureter 12/22/2023    Cholangiocarcinoma 03/16/2022    Cirrhosis 11/23/2020    Diabetes mellitus, type 2     Diabetic neuropathy 11/24/2020    Disorder of kidney and ureter     HTN (hypertension) 11/23/2020    Hyperlipidemia 11/23/2020    Kidney stones 11/23/2020    S/p lithotripsy 2020    Leukocytosis 01/15/2021    Liver mass 11/23/2020    CASTILLO (nonalcoholic steatohepatitis) 11/23/2020    Nausea and vomiting 08/21/2023    Other ascites 03/16/2022    PAD (peripheral artery disease)     Portal hypertension 11/23/2020    Skin cancer 11/23/2020       Past Surgical History:   Procedure Laterality Date    COLONOSCOPY  10/2020    ESOPHAGEAL MANOMETRY WITH MEASUREMENT OF IMPEDANCE N/A 7/17/2023    Procedure: MANOMETRY, ESOPHAGUS, WITH IMPEDANCE MEASUREMENT;  Surgeon: Klarissa Zamora MD;  Location: [facility] (4TH FLR);  Service: Gastroenterology;  Laterality: N/A;  pt diabetic  liver txp  prep insructions sent to pt via email and portal -Jm    ESOPHAGOGASTRODUODENOSCOPY  10/2020    ESOPHAGOGASTRODUODENOSCOPY N/A 7/1/2021    Procedure: EGD (ESOPHAGOGASTRODUODENOSCOPY);  Surgeon: Jovan David MD;  Location: [facility] (4TH FLR);  Service: Endoscopy;  Laterality: N/A;  HCC. Listed for liver transplant. EGD for variceal surveillance.  cardiac clearance and blood thinenr approval received, see telephone encounter 6/23/21-BB  fully vaccinated-BB  labs same day of procedure-BB    EXCISION OF HYDROCELE Right     hemorroid surgery      hemorroidectomy      KIDNEY STONE SURGERY      LAPAROSCOPIC APPENDECTOMY N/A 6/2/2023    Procedure: APPENDECTOMY, LAPAROSCOPIC;  Surgeon: Shan Ross MD;  Location: [facility] OR 2ND FLR;  Service: General;  Laterality: N/A;    LEFT HEART CATHETERIZATION N/A 1/27/2021    Procedure: Left heart cath;  Surgeon: Kris Shelton MD;  Location: [facility] CATH LAB;  Service: Cardiology;   Laterality: N/A;    liver mass removal      LIVER TRANSPLANT N/A 1/6/2022    Procedure: TRANSPLANT, LIVER;  Surgeon: Lizet Garcia MD;  Location: [facility] OR [facility] FLR;  Service: Transplant;  Laterality: N/A;    RIGHT HEART CATHETERIZATION Right 5/7/2021    Procedure: INSERTION, CATHETER, RIGHT HEART;  Surgeon: Jaden Schuster MD;  Location: [facility] CATH LAB;  Service: Cardiology;  Laterality: Right;    TREATMENT OF CARDIAC ARRHYTHMIA N/A 5/19/2021    Procedure: CARDIOVERSION;  Surgeon: Didier Tilley MD;  Location: [facility] EP LAB;  Service: Cardiology;  Laterality: N/A;  a fib, dccv/johny, mac, dm. sscu    TREATMENT OF CARDIAC ARRHYTHMIA N/A 5/31/2021    Procedure: CARDIOVERSION;  Surgeon: Daniel Arambula MD;  Location: [facility] EP LAB;  Service: Cardiology;  Laterality: N/A;  afib, DCCV, anest., DM, 3 prep    TREATMENT OF CARDIAC ARRHYTHMIA N/A 7/7/2021    Procedure: Cardioversion or Defibrillation;  Surgeon: Didier Tilley MD;  Location: [facility] EP LAB;  Service: Cardiology;  Laterality: N/A;  AF, JOHNY (cancel if complaint), DCCV, MAC, DM, 3 Prep    TREATMENT OF CARDIAC ARRHYTHMIA N/A 7/27/2021    Procedure: Cardioversion or Defibrillation;  Surgeon: Didier Tilley MD;  Location: [facility] EP LAB;  Service: Cardiology;  Laterality: N/A;  AF, JOHNY (cx if complaint), DCCV, MAC, DM, 3 Prep       Review of patient's allergies indicates:   Allergen Reactions    Bee pollens Swelling     BEE STINGS swells body       Current Facility-Administered Medications on File Prior to Encounter   Medication    sodium chloride 0.9% bolus 1,000 mL     Current Outpatient Medications on File Prior to Encounter   Medication Sig    amiodarone (PACERONE) 200 MG Tab Take 1 tablet (200 mg total) by mouth once daily.    amLODIPine (NORVASC) 10 MG tablet Take 10 mg by mouth once daily.    apixaban (ELIQUIS) 5 mg Tab Take 1 tablet (5 mg total) by mouth 2 (two) times daily.    buPROPion (WELLBUTRIN XL) 150 MG TB24 tablet Take 1 tablet (150 mg total) by mouth  "every morning.    gabapentin (NEURONTIN) 300 MG capsule Take 1 capsule (300 mg total) by mouth 3 (three) times daily as needed (neuropathy).    insulin (LANTUS SOLOSTAR U-100 INSULIN) glargine 100 units/mL SubQ pen INJECT 22 UNITS INTO THE SKIN EVERY EVENING. ADJUST DOSE UNTIL AM GLUCOSE GOAL . MAX DOSE 30 UNITS/DY    insulin lispro (HUMALOG KWIKPEN INSULIN) 100 unit/mL pen Inject 6 Units into the skin 3 (three) times daily with meals. Plus sliding scale, MDD: 48 units    magnesium oxide (MAG-OX) 400 mg (241.3 mg magnesium) tablet Take 2 tablets (800 mg total) by mouth 3 (three) times daily.    multivitamin capsule Take 1 capsule by mouth once daily.    omega-3 fatty acids/fish oil (FISH OIL-OMEGA-3 FATTY ACIDS) 300-1,000 mg capsule Take 1 capsule by mouth once daily.     ondansetron (ZOFRAN-ODT) 4 MG TbDL Dissolve 1 tablet (4 mg total) by mouth every 8 (eight) hours as needed (nausea).    pantoprazole (PROTONIX) 40 MG tablet Take 1 tablet (40 mg total) by mouth once daily.    QUEtiapine (SEROQUEL) 100 MG Tab Take 1 tablet (100 mg total) by mouth every evening.    sertraline (ZOLOFT) 25 MG tablet Take 1 tablet (25 mg total) by mouth once daily.    tacrolimus (PROGRAF) 0.5 MG Cap Take 2 capsules (1 mg total) by mouth every 12 (twelve) hours.    aspirin (ECOTRIN) 81 MG EC tablet Take 81 mg by mouth once daily.    blood sugar diagnostic Strp To check BG 2 times daily, to use with insurance preferred meter (patient has a [employer] self-monitoring glucose meter)    blood-glucose meter kit To check BG 2 times daily, to use with insurance preferred meter    calcium carbonate-vitamin D3 600 mg-20 mcg (800 unit) Tab Take 1 tablet by mouth once daily.    lancets Misc To check BG 2 times daily, to use with insurance preferred meter    pen needle, diabetic (BD ULTRA-FINE GÉNESIS PEN NEEDLE) 32 gauge x 5/32" Ndle Use to inject insulin 4 times daily    [DISCONTINUED] furosemide (LASIX) 20 MG tablet Take 1 tablet (20 mg total) " by mouth once daily.    [DISCONTINUED] pulse oximeter (PULSE OXIMETER) device by Apply Externally route 2 (two) times a day. Use twice daily at 8 AM and 3 PM and record the value in MyChart as directed.     Family History       Problem Relation (Age of Onset)    Coronary artery disease Father          Tobacco Use    Smoking status: Former     Current packs/day: 0.00     Types: Cigarettes     Quit date: 1980     Years since quittin.9    Smokeless tobacco: Former   Substance and Sexual Activity    Alcohol use: Not Currently    Drug use: Never    Sexual activity: Not on file     Review of Systems   Constitutional:  Positive for activity change. Negative for appetite change, chills and fever.   Respiratory:  Positive for shortness of breath. Negative for wheezing.    Cardiovascular:  Negative for chest pain and leg swelling.   Gastrointestinal:  Negative for abdominal pain, nausea and vomiting.   Genitourinary: Negative.    Musculoskeletal: Negative.    Skin: Negative.    Neurological:  Positive for weakness.     Objective:     Vital Signs (Most Recent):  Temp: 98.5 °F (36.9 °C) (23)  Pulse: 101 (23 0337)  Resp: 18 (23)  BP: 124/70 (23)  SpO2: (!) 93 % (23) Vital Signs (24h Range):  Temp:  [97.7 °F (36.5 °C)-98.5 °F (36.9 °C)] 98.5 °F (36.9 °C)  Pulse:  [] 101  Resp:  [16-22] 18  SpO2:  [93 %-99 %] 93 %  BP: (124-140)/(69-84) 124/70     Weight: 99.8 kg (220 lb)  Body mass index is 29.84 kg/m².     Physical Exam  Vitals and nursing note reviewed.   Constitutional:       General: He is not in acute distress.  HENT:      Head: Normocephalic and atraumatic.   Eyes:      General: No scleral icterus.     Pupils: Pupils are equal, round, and reactive to light.   Cardiovascular:      Rate and Rhythm: Normal rate.      Pulses: Normal pulses.      Heart sounds: Murmur (systolic) heard.   Pulmonary:      Effort: Pulmonary effort is normal. No respiratory distress.  "     Breath sounds: Rales present. No wheezing.   Abdominal:      General: Bowel sounds are normal. There is no distension.      Palpations: Abdomen is soft.      Tenderness: There is no abdominal tenderness. There is no guarding.   Musculoskeletal:      Cervical back: Neck supple.      Right lower leg: No edema.      Left lower leg: No edema.   Skin:     General: Skin is warm and dry.   Neurological:      General: No focal deficit present.      Mental Status: He is alert.              CRANIAL NERVES     CN III, IV, VI   Pupils are equal, round, and reactive to light.       Significant Labs: All pertinent labs within the past 24 hours have been reviewed.  CMP:   Recent Labs   Lab 12/22/23  1840      K 4.4      CO2 24   *   BUN 19   CREATININE 1.1   CALCIUM 8.9   PROT 7.2   ALBUMIN 3.7   BILITOT 1.0   ALKPHOS 75   AST 19   ALT 16   ANIONGAP 9     Cardiac Markers:   Recent Labs   Lab 12/22/23  1840   *     Coagulation: No results for input(s): "PT", "INR", "APTT" in the last 48 hours.  Urine Studies:   Recent Labs   Lab 12/22/23  1931   COLORU Yellow   APPEARANCEUA Clear   PHUR 7.0   SPECGRAV 1.020   PROTEINUA 1+*   GLUCUA Negative   KETONESU Negative   BILIRUBINUA Negative   OCCULTUA Negative   NITRITE Negative   LEUKOCYTESUR Negative   RBCUA 0   WBCUA 0   BACTERIA None   SQUAMEPITHEL 0   HYALINECASTS 0       Significant Imaging: I have reviewed all pertinent imaging results/findings within the past 24 hours.    XR CHEST AP PORTABLE     CLINICAL HISTORY:  Shortness of breath     TECHNIQUE:  Single frontal view of the chest was performed.     COMPARISON:  Chest radiograph 06/01/2023, CT chest 09/18/2023     FINDINGS:  Monitoring leads overlie the chest.  Patient is rotated.  Resolution is limited by body habitus with underpenetration.     Cardiomediastinal silhouette is midline and prominent similar to prior.  Scattered linear opacities throughout the lungs consistent with minimal platelike " scarring versus atelectasis.  Few scattered new subtle patchy subsegmental opacities at the bilateral mid to lower lung zones.  The lungs are otherwise well expanded without consolidation, pleural effusion or pneumothorax.  No acute osseous process seen.  PA and lateral views can be obtained.     Impression:     Bilateral mid to lower lung zone new patchy subtle opacities which may reflect subsegmental atelectasis versus multifocal airspace process including aspiration or pneumonia in the proper clinical setting.  Consider short-term follow-up chest radiography after therapy to ensure resolution.        Electronically signed by: Stan Braun MD  Date:                                            12/22/2023  Time:                                           19:39

## 2023-12-23 NOTE — ASSESSMENT & PLAN NOTE
Patient's anemia is currently uncontrolled. Has not received any PRBCs to date. Etiology likely d/t Iron deficiency  Current CBC reviewed-   Lab Results   Component Value Date    HGB 8.7 (L) 12/23/2023    HCT 31.2 (L) 12/23/2023     Monitor serial CBC and transfuse if patient becomes hemodynamically unstable, symptomatic or H/H drops below 7/21.

## 2023-12-23 NOTE — ASSESSMENT & PLAN NOTE
Chronic, controlled. Latest blood pressure and vitals reviewed-     Temp:  [97.7 °F (36.5 °C)-98.5 °F (36.9 °C)]   Pulse:  []   Resp:  [16-22]   BP: (124-140)/(69-84)   SpO2:  [93 %-99 %] .   Home meds for hypertension were reviewed and noted below.   Hypertension Medications               amLODIPine (NORVASC) 10 MG tablet Take 10 mg by mouth once daily.            While in the hospital, will manage blood pressure as follows; Continue home antihypertensive regimen

## 2023-12-23 NOTE — H&P
Eric [facility]uel[facility] - Intensive Care ([address] Medicine  History & Physical    Patient Name: Tej Purdy  MRN: [id number]  Patient Class: OP- Observation  Admission Date: 12/22/2023  Attending Physician: Sherri Vanessa MD [facility] HOSP MED D  Admitting Physician: Jose Kaye MD  Primary Care Provider: Thais Maxwell MD      Patient information was obtained from patient, past medical records, and ER records.     Subjective:     Principal Problem:Acute diastolic (congestive) heart failure    Chief Complaint:   Chief Complaint   Patient presents with    Fatigue     Worsens with exertion x 3 days, liver tx 2 years ago        HPI: 68-year-old WM, with history CASTILLO cirrhosis s/p Liver Transplant, Aortic Stenosis, Type 2 Diabetes, Hypertension presents to the ED with complaints of fatigue and dyspnea.   He reports that he has been having progressive dyspnea over the last 3 days.  Currently dyspnea present at rest, with minimal exertion - reporting walking 2-3 feet would cause dyspnea, he has orthopnea as well.  States he can't sleep on his side at home and has been getting up and sitting in his recliner.  Having difficulty sleeping and resulting fatigue from insomnia.      He is not taking diuretics as outpatient regularly, his wife assists in organizing/managing his medications, she is not present at bedside tonight.   He denies any chest pain, has had some chest tightness this evening.   He has received 40mg IV lasix in the ED and during my interview had total 900cc UOP in urinal (approx 23:00)     He is a former tobacco smoker, reports marijuana use(smoked) recently, no alcohol use       Past Medical History:   Diagnosis Date    Acute appendicitis 06/02/2023    Atrial fibrillation     Calculus of ureter 12/22/2023    Cholangiocarcinoma 03/16/2022    Cirrhosis 11/23/2020    Diabetes mellitus, type 2     Diabetic neuropathy 11/24/2020    Disorder of kidney and ureter     HTN (hypertension) 11/23/2020     Hyperlipidemia 11/23/2020    Kidney stones 11/23/2020    S/p lithotripsy 2020    Leukocytosis 01/15/2021    Liver mass 11/23/2020    CSATILLO (nonalcoholic steatohepatitis) 11/23/2020    Nausea and vomiting 08/21/2023    Other ascites 03/16/2022    PAD (peripheral artery disease)     Portal hypertension 11/23/2020    Skin cancer 11/23/2020       Past Surgical History:   Procedure Laterality Date    COLONOSCOPY  10/2020    ESOPHAGEAL MANOMETRY WITH MEASUREMENT OF IMPEDANCE N/A 7/17/2023    Procedure: MANOMETRY, ESOPHAGUS, WITH IMPEDANCE MEASUREMENT;  Surgeon: Klarissa Zamora MD;  Location: [facility] ENDO (4TH FLR);  Service: Gastroenterology;  Laterality: N/A;  pt diabetic  liver txp  prep insructions sent to pt via email and portal -Jm    ESOPHAGOGASTRODUODENOSCOPY  10/2020    ESOPHAGOGASTRODUODENOSCOPY N/A 7/1/2021    Procedure: EGD (ESOPHAGOGASTRODUODENOSCOPY);  Surgeon: Jovan David MD;  Location: [facility] ENDO (4TH FLR);  Service: Endoscopy;  Laterality: N/A;  HCC. Listed for liver transplant. EGD for variceal surveillance.  cardiac clearance and blood thinenr approval received, see telephone encounter 6/23/21-BB  fully vaccinated-BB  labs same day of procedure-BB    EXCISION OF HYDROCELE Right     hemorroid surgery      hemorroidectomy      KIDNEY STONE SURGERY      LAPAROSCOPIC APPENDECTOMY N/A 6/2/2023    Procedure: APPENDECTOMY, LAPAROSCOPIC;  Surgeon: Shan Ross MD;  [facility];  Service: General;  Laterality: N/A;    LEFT HEART CATHETERIZATION N/A 1/27/2021    Procedure: Left heart cath;  Surgeon: Kris Shelton MD;  Location: [facility] CATH LAB;  Service: Cardiology;  Laterality: N/A;    liver mass removal      LIVER TRANSPLANT N/A 1/6/2022    Procedure: TRANSPLANT, LIVER;  Surgeon: Lizet Garcia MD;  [facility];  Service: Transplant;  Laterality: N/A;    RIGHT HEART CATHETERIZATION Right 5/7/2021    Procedure: INSERTION, CATHETER, RIGHT HEART;  Surgeon: Jaden Schuster  MD;  Location: [facility] CATH LAB;  Service: Cardiology;  Laterality: Right;    TREATMENT OF CARDIAC ARRHYTHMIA N/A 5/19/2021    Procedure: CARDIOVERSION;  Surgeon: Didier Tilley MD;  Location: [facility] EP LAB;  Service: Cardiology;  Laterality: N/A;  a fib, dccv/johny, mac, dm. sscu    TREATMENT OF CARDIAC ARRHYTHMIA N/A 5/31/2021    Procedure: CARDIOVERSION;  Surgeon: Daniel Arambula MD;  Location: [facility] EP LAB;  Service: Cardiology;  Laterality: N/A;  afib, DCCV, anest., DM, 3 prep    TREATMENT OF CARDIAC ARRHYTHMIA N/A 7/7/2021    Procedure: Cardioversion or Defibrillation;  Surgeon: Didier Tilley MD;  Location: [facility] EP LAB;  Service: Cardiology;  Laterality: N/A;  AF, JOHNY (cancel if complaint), DCCV, MAC, DM, 3 Prep    TREATMENT OF CARDIAC ARRHYTHMIA N/A 7/27/2021    Procedure: Cardioversion or Defibrillation;  Surgeon: Didier Tilley MD;  Location: [facility] EP LAB;  Service: Cardiology;  Laterality: N/A;  AF, JOHNY (cx if complaint), DCCV, MAC, DM, 3 Prep       Review of patient's allergies indicates:   Allergen Reactions    Bee pollens Swelling     BEE STINGS swells body       Current Facility-Administered Medications on File Prior to Encounter   Medication    sodium chloride 0.9% bolus 1,000 mL     Current Outpatient Medications on File Prior to Encounter   Medication Sig    amiodarone (PACERONE) 200 MG Tab Take 1 tablet (200 mg total) by mouth once daily.    amLODIPine (NORVASC) 10 MG tablet Take 10 mg by mouth once daily.    apixaban (ELIQUIS) 5 mg Tab Take 1 tablet (5 mg total) by mouth 2 (two) times daily.    buPROPion (WELLBUTRIN XL) 150 MG TB24 tablet Take 1 tablet (150 mg total) by mouth every morning.    gabapentin (NEURONTIN) 300 MG capsule Take 1 capsule (300 mg total) by mouth 3 (three) times daily as needed (neuropathy).    insulin (LANTUS SOLOSTAR U-100 INSULIN) glargine 100 units/mL SubQ pen INJECT 22 UNITS INTO THE SKIN EVERY EVENING. ADJUST DOSE UNTIL AM GLUCOSE GOAL . MAX DOSE 30 UNITS/DY     "insulin lispro (HUMALOG KWIKPEN INSULIN) 100 unit/mL pen Inject 6 Units into the skin 3 (three) times daily with meals. Plus sliding scale, MDD: 48 units    magnesium oxide (MAG-OX) 400 mg (241.3 mg magnesium) tablet Take 2 tablets (800 mg total) by mouth 3 (three) times daily.    multivitamin capsule Take 1 capsule by mouth once daily.    omega-3 fatty acids/fish oil (FISH OIL-OMEGA-3 FATTY ACIDS) 300-1,000 mg capsule Take 1 capsule by mouth once daily.     ondansetron (ZOFRAN-ODT) 4 MG TbDL Dissolve 1 tablet (4 mg total) by mouth every 8 (eight) hours as needed (nausea).    pantoprazole (PROTONIX) 40 MG tablet Take 1 tablet (40 mg total) by mouth once daily.    QUEtiapine (SEROQUEL) 100 MG Tab Take 1 tablet (100 mg total) by mouth every evening.    sertraline (ZOLOFT) 25 MG tablet Take 1 tablet (25 mg total) by mouth once daily.    tacrolimus (PROGRAF) 0.5 MG Cap Take 2 capsules (1 mg total) by mouth every 12 (twelve) hours.    aspirin (ECOTRIN) 81 MG EC tablet Take 81 mg by mouth once daily.    blood sugar diagnostic Strp To check BG 2 times daily, to use with insurance preferred meter (patient has a [employer] self-monitoring glucose meter)    blood-glucose meter kit To check BG 2 times daily, to use with insurance preferred meter    calcium carbonate-vitamin D3 600 mg-20 mcg (800 unit) Tab Take 1 tablet by mouth once daily.    lancets Misc To check BG 2 times daily, to use with insurance preferred meter    pen needle, diabetic (BD ULTRA-FINE GÉNESIS PEN NEEDLE) 32 gauge x 5/32" Ndle Use to inject insulin 4 times daily    [DISCONTINUED] furosemide (LASIX) 20 MG tablet Take 1 tablet (20 mg total) by mouth once daily.    [DISCONTINUED] pulse oximeter (PULSE OXIMETER) device by Apply Externally route 2 (two) times a day. Use twice daily at 8 AM and 3 PM and record the value in MyChart as directed.     Family History       Problem Relation (Age of Onset)    Coronary artery disease Father          Tobacco Use    Smoking " status: Former     Current packs/day: 0.00     Types: Cigarettes     Quit date: 1980     Years since quittin.9    Smokeless tobacco: Former   Substance and Sexual Activity    Alcohol use: Not Currently    Drug use: Never    Sexual activity: Not on file     Review of Systems   Constitutional:  Positive for activity change. Negative for appetite change, chills and fever.   Respiratory:  Positive for shortness of breath. Negative for wheezing.    Cardiovascular:  Negative for chest pain and leg swelling.   Gastrointestinal:  Negative for abdominal pain, nausea and vomiting.   Genitourinary: Negative.    Musculoskeletal: Negative.    Skin: Negative.    Neurological:  Positive for weakness.     Objective:     Vital Signs (Most Recent):  Temp: 98.5 °F (36.9 °C) (23)  Pulse: 101 (23 0337)  Resp: 18 (23)  BP: 124/70 (23)  SpO2: (!) 93 % (23) Vital Signs (24h Range):  Temp:  [97.7 °F (36.5 °C)-98.5 °F (36.9 °C)] 98.5 °F (36.9 °C)  Pulse:  [] 101  Resp:  [16-22] 18  SpO2:  [93 %-99 %] 93 %  BP: (124-140)/(69-84) 124/70     Weight: 99.8 kg (220 lb)  Body mass index is 29.84 kg/m².     Physical Exam  Vitals and nursing note reviewed.   Constitutional:       General: He is not in acute distress.  HENT:      Head: Normocephalic and atraumatic.   Eyes:      General: No scleral icterus.     Pupils: Pupils are equal, round, and reactive to light.   Cardiovascular:      Rate and Rhythm: Normal rate.      Pulses: Normal pulses.      Heart sounds: Murmur (systolic) heard.   Pulmonary:      Effort: Pulmonary effort is normal. No respiratory distress.      Breath sounds: Rales present. No wheezing.   Abdominal:      General: Bowel sounds are normal. There is no distension.      Palpations: Abdomen is soft.      Tenderness: There is no abdominal tenderness. There is no guarding.   Musculoskeletal:      Cervical back: Neck supple.      Right lower leg: No edema.      Left  "lower leg: No edema.   Skin:     General: Skin is warm and dry.   Neurological:      General: No focal deficit present.      Mental Status: He is alert.              CRANIAL NERVES     CN III, IV, VI   Pupils are equal, round, and reactive to light.       Significant Labs: All pertinent labs within the past 24 hours have been reviewed.  CMP:   Recent Labs   Lab 12/22/23  1840      K 4.4      CO2 24   *   BUN 19   CREATININE 1.1   CALCIUM 8.9   PROT 7.2   ALBUMIN 3.7   BILITOT 1.0   ALKPHOS 75   AST 19   ALT 16   ANIONGAP 9     Cardiac Markers:   Recent Labs   Lab 12/22/23  1840   *     Coagulation: No results for input(s): "PT", "INR", "APTT" in the last 48 hours.  Urine Studies:   Recent Labs   Lab 12/22/23  1931   COLORU Yellow   APPEARANCEUA Clear   PHUR 7.0   SPECGRAV 1.020   PROTEINUA 1+*   GLUCUA Negative   KETONESU Negative   BILIRUBINUA Negative   OCCULTUA Negative   NITRITE Negative   LEUKOCYTESUR Negative   RBCUA 0   WBCUA 0   BACTERIA None   SQUAMEPITHEL 0   HYALINECASTS 0       Significant Imaging: I have reviewed all pertinent imaging results/findings within the past 24 hours.    XR CHEST AP PORTABLE     CLINICAL HISTORY:  Shortness of breath     TECHNIQUE:  Single frontal view of the chest was performed.     COMPARISON:  Chest radiograph 06/01/2023, CT chest 09/18/2023     FINDINGS:  Monitoring leads overlie the chest.  Patient is rotated.  Resolution is limited by body habitus with underpenetration.     Cardiomediastinal silhouette is midline and prominent similar to prior.  Scattered linear opacities throughout the lungs consistent with minimal platelike scarring versus atelectasis.  Few scattered new subtle patchy subsegmental opacities at the bilateral mid to lower lung zones.  The lungs are otherwise well expanded without consolidation, pleural effusion or pneumothorax.  No acute osseous process seen.  PA and lateral views can be obtained.     Impression:     Bilateral " mid to lower lung zone new patchy subtle opacities which may reflect subsegmental atelectasis versus multifocal airspace process including aspiration or pneumonia in the proper clinical setting.  Consider short-term follow-up chest radiography after therapy to ensure resolution.        Electronically signed by: Stan Braun MD  Date:                                            12/22/2023  Time:                                           19:39  Assessment/Plan:     * Acute diastolic (congestive) heart failure  -patient with orthopnea, dyspnea at rest and exertion  -hx of preserved EF  -patient with hx of aortic stenosis, Repeat ECHO to evaluate if worsening of valvular disease  -s/p 40mg IV lasix in ED and diuresing - given 60mg IV lasix daily in Am      Nonrheumatic aortic valve stenosis  Checking ECHO to evaluate for progression of disease      Primary hypertension  Chronic, controlled. Latest blood pressure and vitals reviewed-     Temp:  [97.7 °F (36.5 °C)-98.5 °F (36.9 °C)]   Pulse:  []   Resp:  [16-22]   BP: (124-140)/(69-84)   SpO2:  [93 %-99 %] .   Home meds for hypertension were reviewed and noted below.   Hypertension Medications               amLODIPine (NORVASC) 10 MG tablet Take 10 mg by mouth once daily.            While in the hospital, will manage blood pressure as follows; Continue home antihypertensive regimen    S/P liver transplant  -continue tacrolimus  -hepatology consult  -check AM tacrolimus level      Type 2 diabetes mellitus, with long-term current use of insulin  Patient's FSGs are controlled on current medication regimen.  Last A1c reviewed-   Lab Results   Component Value Date    HGBA1C 6.5 (H) 12/23/2023     Most recent fingerstick glucose reviewed-   Recent Labs   Lab 12/22/23  1842   POCTGLUCOSE 189*     Current correctional scale  Low  Maintain anti-hyperglycemic dose as follows-   Antihyperglycemics (From admission, onward)      Start     Stop Route Frequency Ordered    12/23/23  0715  insulin aspart U-100 pen 4 Units         -- SubQ 3 times daily with meals 12/23/23 0020 12/23/23 0120  insulin aspart U-100 pen 0-5 Units         -- SubQ Before meals & nightly PRN 12/23/23 0021 12/23/23 0030  insulin detemir U-100 (Levemir) pen 8 Units         -- SubQ 2 times daily 12/23/23 0020          Hold Oral hypoglycemics while patient is in the hospital.        Persistent atrial fibrillation  Patient with Persistent (7 days or more) atrial fibrillation which is controlled currently with Amiodarone. Patient is currently in atrial fibrillation.[id number] Score: 3. HASBLED Score: . Anticoagulation indicated. Anticoagulation done with apixaban .    Anemia due to unknown mechanism  Patient's anemia is currently uncontrolled. Has not received any PRBCs to date. Etiology likely d/t Iron deficiency  Current CBC reviewed-   Lab Results   Component Value Date    HGB 8.7 (L) 12/23/2023    HCT 31.2 (L) 12/23/2023     Monitor serial CBC and transfuse if patient becomes hemodynamically unstable, symptomatic or H/H drops below 7/21.      VTE Risk Mitigation (From admission, onward)           Ordered     apixaban tablet 5 mg  2 times daily         12/23/23 0020     Reason for No Pharmacological VTE Prophylaxis  Once        Question:  Reasons:  Answer:  Already adequately anticoagulated on oral Anticoagulants    12/23/23 0020     IP VTE HIGH RISK PATIENT  Once         12/23/23 0020     Place sequential compression device  Until discontinued         12/23/23 0020                       On 12/23/2023, patient should be placed in hospital observation services under my care.             Jose Kaye MD  Department of Hospital Medicine  [facility] - Intensive Care ([place] [id number])

## 2023-12-23 NOTE — ASSESSMENT & PLAN NOTE
-patient with orthopnea, dyspnea at rest and exertion  -hx of preserved EF  -patient with hx of aortic stenosis, Repeat ECHO to evaluate if worsening of valvular disease  -s/p 40mg IV lasix in ED and diuresing - given 60mg IV lasix daily in Am

## 2023-12-23 NOTE — NURSING
Pt arrived to room via stretcher with personal belongings and in no immediate distress. Pt alert and oriented x 4, VS stable, ambulates without difficulty. Pt oriented to room and call light use. Side rails up x 2, bed locked and in lowest position, nonskid socks and urinal provided, call light within reach. No concerns at this time.

## 2023-12-23 NOTE — CONSULTS
[facility]  Hepatology  Consult Note    Patient Name: Tej Purdy  MRN: [id number]  Admission Date: 12/22/2023  Hospital Length of Stay: 0 days  Code Status: Full Code   Attending Provider:  Dr. Rios  Consulting Provider: Radames Lam MD  Primary Care Physician: Thais Maxwell MD  Principal Problem:Acute diastolic (congestive) heart failure    Inpatient consult to Hepatology  Consult performed by: Radames Lam MD  Consult ordered by: Jose Kaye MD        Subjective:     HPI: Tej Purdy is a 68 y.o. male with history of cholangiocarcinoma (status post liver transplant in 01/2022),  Aortic Stenosis, Type 2 Diabetes, Hypertension presents to the ED with complaints of fatigue and dyspnea. CXR showed bilateral lower opacities. Treatment was started with IV diuretics. Hepatology consulted for IS management. Labs - Cr 1.2, AST 19, ALT 16, Prograf 11.4.     He is currently on Prograf 1 mg BID.  He was last seen by Dr. Chapin crawford in clinic in September 2023.  At the time, he had excellent allograft function on Prograf monotherapy. Patient denies missed doses, or any EtOH use.     PEndoHx:  EGD (7/1/21):                          - Esophageal mucosal changes suspicious for                          short-segment Aragon's esophagus, biopsied (Intestinal                           metaplasia, negative for dysplasia).                          - Small hiatal hernia.                          - No esophageal or gastric varices.     Colonoscopy in 2020 showed no polyps (report not available).    Review of Systems   Constitutional:  Negative for chills and fever.   Respiratory:  Positive for shortness of breath. Negative for cough.    Cardiovascular:  Positive for leg swelling. Negative for chest pain.   Gastrointestinal:  Negative for abdominal pain, nausea and vomiting.   Psychiatric/Behavioral:  Negative for substance abuse.         Objective:     Vitals:    12/23/23 0337   BP:     Pulse: 101   Resp:    Temp:          Constitutional:  alert,  not in acute distress, oriented to time, place, and person, and well developed  HENT: Head: Normal, normocephalic, atraumatic.  Eyes: conjunctiva clear and sclera nonicteric  GI: soft, non-tender, without masses or organomegaly, nondistended, and well healed surgical scar seen  Skin: normal color and no jaundice  Neurological: alert, oriented x3  speech normal in context and clarity  memory intact grossly  Psychiatric: mood and affect are within normal limits, pt is a good historian; no memory problems were noted      Significant Labs:  Recent Labs   Lab 12/22/23  1840 12/23/23  0320   HGB 9.7* 8.7*       Lab Results   Component Value Date    WBC 5.25 12/23/2023    HGB 8.7 (L) 12/23/2023    HCT 31.2 (L) 12/23/2023    MCV 68 (L) 12/23/2023     (L) 12/23/2023       Lab Results   Component Value Date     12/23/2023    K 4.1 12/23/2023     12/23/2023    CO2 25 12/23/2023    BUN 18 12/23/2023    CREATININE 1.2 12/23/2023    CALCIUM 8.7 12/23/2023    ANIONGAP 10 12/23/2023    ESTGFRAFRICA >60.0 07/18/2022    EGFRNONAA >60.0 07/18/2022       Lab Results   Component Value Date    ALT 16 12/22/2023    AST 19 12/22/2023    GGT 48 02/25/2021    ALKPHOS 75 12/22/2023    BILITOT 1.0 12/22/2023       Lab Results   Component Value Date    INR 1.3 (H) 06/01/2023    INR 1.2 02/01/2022    INR 1.2 01/13/2022       Significant Imaging:  Reviewed pertinent radiology findings.       Assessment/Plan:     Tej Purdy is a 68 y.o. male with history of history of cholangiocarcinoma (status post liver transplant in 01/2022),  Aortic Stenosis, Type 2 Diabetes, Hypertension presents to the ED with CHF exacerbation. Hepatology consulted for IS management. Labs show excellent allograft function on prograf monotherapy. Patient reports good compliance. Follows up with Dr. Bzowej in clinic.     Problem List:  Cholangiocarcinoma (status post liver transplant in  01/2022)  On chronic immunosuppression      Recommendations:  - Obtain US liver with Doppler to r/o thrombosis. Also obtain US abdomen to r/o ascites.   - Continue Prograf 1 mg BID  - Check CEA, CA 19-9  - Daily CBC, CMP, AM Prograf level    Thank you for involving us in the care of Tej Purdy. Please call with any additional questions, concerns or changes in the patient's clinical status. We will continue to follow.     Radames Lam MD  Gastroenterology Fellow PGY V  [facility]

## 2023-12-24 ENCOUNTER — PATIENT MESSAGE (OUTPATIENT)
Dept: TRANSPLANT | Facility: CLINIC | Age: 68
End: 2023-12-24
Payer: MEDICARE

## 2023-12-24 ENCOUNTER — PATIENT MESSAGE (OUTPATIENT)
Dept: HEMATOLOGY/ONCOLOGY | Facility: CLINIC | Age: 68
End: 2023-12-24
Payer: MEDICARE

## 2023-12-24 PROBLEM — I50.20 HFREF (HEART FAILURE WITH REDUCED EJECTION FRACTION): Status: ACTIVE | Noted: 2023-12-24

## 2023-12-24 PROBLEM — I50.31 ACUTE DIASTOLIC (CONGESTIVE) HEART FAILURE: Status: RESOLVED | Noted: 2023-12-22 | Resolved: 2023-12-24

## 2023-12-24 LAB
ANION GAP SERPL CALC-SCNC: 8 MMOL/L (ref 8–16)
BASOPHILS # BLD AUTO: 0.05 K/UL (ref 0–0.2)
BASOPHILS NFR BLD: 0.9 % (ref 0–1.9)
BUN SERPL-MCNC: 21 MG/DL (ref 8–23)
CALCIUM SERPL-MCNC: 8.6 MG/DL (ref 8.7–10.5)
CANCER AG19-9 SERPL-ACNC: <2.1 U/ML (ref 0–40)
CEA SERPL-MCNC: <1.7 NG/ML (ref 0–5)
CHLORIDE SERPL-SCNC: 103 MMOL/L (ref 95–110)
CO2 SERPL-SCNC: 27 MMOL/L (ref 23–29)
CREAT SERPL-MCNC: 1.2 MG/DL (ref 0.5–1.4)
DIFFERENTIAL METHOD BLD: ABNORMAL
EOSINOPHIL # BLD AUTO: 0.1 K/UL (ref 0–0.5)
EOSINOPHIL NFR BLD: 2.4 % (ref 0–8)
ERYTHROCYTE [DISTWIDTH] IN BLOOD BY AUTOMATED COUNT: 19.9 % (ref 11.5–14.5)
EST. GFR  (NO RACE VARIABLE): >60 ML/MIN/1.73 M^2
GLUCOSE SERPL-MCNC: 136 MG/DL (ref 70–110)
HCT VFR BLD AUTO: 30.9 % (ref 40–54)
HGB BLD-MCNC: 8.6 G/DL (ref 14–18)
IMM GRANULOCYTES # BLD AUTO: 0.02 K/UL (ref 0–0.04)
IMM GRANULOCYTES NFR BLD AUTO: 0.3 % (ref 0–0.5)
LYMPHOCYTES # BLD AUTO: 1.3 K/UL (ref 1–4.8)
LYMPHOCYTES NFR BLD: 22.3 % (ref 18–48)
MAGNESIUM SERPL-MCNC: 1.9 MG/DL (ref 1.6–2.6)
MCH RBC QN AUTO: 18.9 PG (ref 27–31)
MCHC RBC AUTO-ENTMCNC: 27.8 G/DL (ref 32–36)
MCV RBC AUTO: 68 FL (ref 82–98)
MONOCYTES # BLD AUTO: 0.8 K/UL (ref 0.3–1)
MONOCYTES NFR BLD: 13.6 % (ref 4–15)
NEUTROPHILS # BLD AUTO: 3.6 K/UL (ref 1.8–7.7)
NEUTROPHILS NFR BLD: 60.5 % (ref 38–73)
NRBC BLD-RTO: 0 /100 WBC
PHOSPHATE SERPL-MCNC: 3.4 MG/DL (ref 2.7–4.5)
PLATELET # BLD AUTO: 130 K/UL (ref 150–450)
PMV BLD AUTO: ABNORMAL FL (ref 9.2–12.9)
POCT GLUCOSE: 146 MG/DL (ref 70–110)
POCT GLUCOSE: 162 MG/DL (ref 70–110)
POCT GLUCOSE: 180 MG/DL (ref 70–110)
POCT GLUCOSE: 186 MG/DL (ref 70–110)
POTASSIUM SERPL-SCNC: 4.1 MMOL/L (ref 3.5–5.1)
RBC # BLD AUTO: 4.54 M/UL (ref 4.6–6.2)
SODIUM SERPL-SCNC: 138 MMOL/L (ref 136–145)
TACROLIMUS BLD-MCNC: 9.1 NG/ML (ref 5–15)
WBC # BLD AUTO: 5.87 K/UL (ref 3.9–12.7)

## 2023-12-24 PROCEDURE — 93005 ELECTROCARDIOGRAM TRACING: CPT

## 2023-12-24 PROCEDURE — 82378 CARCINOEMBRYONIC ANTIGEN: CPT | Performed by: STUDENT IN AN ORGANIZED HEALTH CARE EDUCATION/TRAINING PROGRAM

## 2023-12-24 PROCEDURE — 63600175 PHARM REV CODE 636 W HCPCS: Performed by: HOSPITALIST

## 2023-12-24 PROCEDURE — 84100 ASSAY OF PHOSPHORUS: CPT | Performed by: HOSPITALIST

## 2023-12-24 PROCEDURE — 83735 ASSAY OF MAGNESIUM: CPT | Performed by: HOSPITALIST

## 2023-12-24 PROCEDURE — 80197 ASSAY OF TACROLIMUS: CPT | Performed by: STUDENT IN AN ORGANIZED HEALTH CARE EDUCATION/TRAINING PROGRAM

## 2023-12-24 PROCEDURE — 63600175 PHARM REV CODE 636 W HCPCS: Performed by: STUDENT IN AN ORGANIZED HEALTH CARE EDUCATION/TRAINING PROGRAM

## 2023-12-24 PROCEDURE — 86301 IMMUNOASSAY TUMOR CA 19-9: CPT | Performed by: STUDENT IN AN ORGANIZED HEALTH CARE EDUCATION/TRAINING PROGRAM

## 2023-12-24 PROCEDURE — 36415 COLL VENOUS BLD VENIPUNCTURE: CPT | Performed by: HOSPITALIST

## 2023-12-24 PROCEDURE — 93010 ELECTROCARDIOGRAM REPORT: CPT | Mod: ,,, | Performed by: INTERNAL MEDICINE

## 2023-12-24 PROCEDURE — 80048 BASIC METABOLIC PNL TOTAL CA: CPT | Performed by: HOSPITALIST

## 2023-12-24 PROCEDURE — 99233 SBSQ HOSP IP/OBS HIGH 50: CPT | Mod: GC,,, | Performed by: INTERNAL MEDICINE

## 2023-12-24 PROCEDURE — 21400001 HC TELEMETRY ROOM

## 2023-12-24 PROCEDURE — 25000003 PHARM REV CODE 250: Performed by: HOSPITALIST

## 2023-12-24 PROCEDURE — 85025 COMPLETE CBC W/AUTO DIFF WBC: CPT | Performed by: HOSPITALIST

## 2023-12-24 RX ORDER — TACROLIMUS 0.5 MG/1
0.5 CAPSULE ORAL 2 TIMES DAILY
Status: DISCONTINUED | OUTPATIENT
Start: 2023-12-24 | End: 2024-01-02 | Stop reason: HOSPADM

## 2023-12-24 RX ADMIN — Medication 800 MG: at 05:12

## 2023-12-24 RX ADMIN — PANTOPRAZOLE SODIUM 40 MG: 40 TABLET, DELAYED RELEASE ORAL at 08:12

## 2023-12-24 RX ADMIN — TACROLIMUS 0.5 MG: 0.5 CAPSULE ORAL at 05:12

## 2023-12-24 RX ADMIN — INSULIN ASPART 4 UNITS: 100 INJECTION, SOLUTION INTRAVENOUS; SUBCUTANEOUS at 08:12

## 2023-12-24 RX ADMIN — AMLODIPINE BESYLATE 10 MG: 10 TABLET ORAL at 08:12

## 2023-12-24 RX ADMIN — QUETIAPINE FUMARATE 100 MG: 25 TABLET ORAL at 08:12

## 2023-12-24 RX ADMIN — SERTRALINE HYDROCHLORIDE 25 MG: 25 TABLET ORAL at 08:12

## 2023-12-24 RX ADMIN — THERA TABS 1 TABLET: TAB at 08:12

## 2023-12-24 RX ADMIN — INSULIN DETEMIR 8 UNITS: 100 INJECTION, SOLUTION SUBCUTANEOUS at 08:12

## 2023-12-24 RX ADMIN — FUROSEMIDE 60 MG: 10 INJECTION, SOLUTION INTRAMUSCULAR; INTRAVENOUS at 08:12

## 2023-12-24 RX ADMIN — TACROLIMUS 1 MG: 1 CAPSULE ORAL at 08:12

## 2023-12-24 RX ADMIN — Medication 800 MG: at 08:12

## 2023-12-24 RX ADMIN — INSULIN ASPART 4 UNITS: 100 INJECTION, SOLUTION INTRAVENOUS; SUBCUTANEOUS at 12:12

## 2023-12-24 RX ADMIN — APIXABAN 5 MG: 5 TABLET, FILM COATED ORAL at 08:12

## 2023-12-24 RX ADMIN — Medication 1 CAPSULE: at 08:12

## 2023-12-24 RX ADMIN — INSULIN ASPART 4 UNITS: 100 INJECTION, SOLUTION INTRAVENOUS; SUBCUTANEOUS at 05:12

## 2023-12-24 RX ADMIN — BUPROPION HYDROCHLORIDE 150 MG: 150 TABLET, FILM COATED, EXTENDED RELEASE ORAL at 07:12

## 2023-12-24 NOTE — ASSESSMENT & PLAN NOTE
-patient with orthopnea, dyspnea at rest and exertion  -hx of preserved EF  -patient with hx of aortic stenosis, Repeat ECHO showed severe reduced EF of 20-25%, moderate to severe low gradient aortic stenosis.  -s/p 40mg IV lasix in ED and diuresing - on 60mg IV lasix daily   -cardiology consulted appreciate recommendation

## 2023-12-24 NOTE — PROGRESS NOTES
[facility] - Intensive Care ([place] [address])  [facility] Medicine  Progress Note    Patient Name: Tej Purdy  MRN: [id number]  Patient Class: IP- Inpatient   Admission Date: 12/22/2023  Length of Stay: 1 days  Attending Physician: Sherri Vanessa MD  Primary Care Provider: Thais Maxwell MD        Subjective:     Principal Problem:Acute diastolic (congestive) heart failure        HPI:  68-year-old WM, with history CASTILLO cirrhosis s/p Liver Transplant, Aortic Stenosis, Type 2 Diabetes, Hypertension presents to the ED with complaints of fatigue and dyspnea.   He reports that he has been having progressive dyspnea over the last 3 days.  Currently dyspnea present at rest, with minimal exertion - reporting walking 2-3 feet would cause dyspnea, he has orthopnea as well.  States he can't sleep on his side at home and has been getting up and sitting in his recliner.  Having difficulty sleeping and resulting fatigue from insomnia.      He is not taking diuretics as outpatient regularly, his wife assists in organizing/managing his medications, she is not present at bedside tonight.   He denies any chest pain, has had some chest tightness this evening.   He has received 40mg IV lasix in the ED and during my interview had total 900cc UOP in urinal (approx 23:00)     He is a former tobacco smoker, reports marijuana use(smoked) recently, no alcohol use       Overview/Hospital Course:  Echo showed severe reduced EF of 20-25%, moderate to severe low gradient aortic stenosis.    Interval History:  Patient reports significant improvement in breathing.  Cardiology consulted.  Wife was updated over phone.      Review of Systems   Constitutional:  Positive for activity change. Negative for appetite change, chills and fever.   Respiratory:  Positive for shortness of breath. Negative for wheezing.    Cardiovascular:  Negative for chest pain and leg swelling.   Gastrointestinal:  Negative for abdominal pain, nausea and vomiting.    Genitourinary: Negative.    Musculoskeletal: Negative.    Skin: Negative.    Neurological:  Positive for weakness.     Objective:     Vital Signs (Most Recent):  Temp: 98 °F (36.7 °C) (12/24/23 0808)  Pulse: 109 (12/24/23 1136)  Resp: 16 (12/24/23 0808)  BP: 109/72 (12/24/23 0808)  SpO2: (!) 94 % (12/24/23 0808) Vital Signs (24h Range):  Temp:  [98 °F (36.7 °C)-98.5 °F (36.9 °C)] 98 °F (36.7 °C)  Pulse:  [] 109  Resp:  [15-18] 16  SpO2:  [92 %-96 %] 94 %  BP: ()/(52-76) 109/72     Weight: 99.8 kg (220 lb)  Body mass index is 29.84 kg/m².     Physical Exam  Vitals and nursing note reviewed.   Constitutional:       General: He is not in acute distress.  HENT:      Head: Normocephalic and atraumatic.   Eyes:      General: No scleral icterus.     Pupils: Pupils are equal, round, and reactive to light.   Cardiovascular:      Rate and Rhythm: Normal rate.      Pulses: Normal pulses.      Heart sounds: Murmur (systolic) heard.   Pulmonary:      Effort: Pulmonary effort is normal. No respiratory distress.      Breath sounds: Rales present. No wheezing.   Abdominal:      General: Bowel sounds are normal. There is no distension.      Palpations: Abdomen is soft.      Tenderness: There is no abdominal tenderness. There is no guarding.   Musculoskeletal:      Cervical back: Neck supple.      Right lower leg: No edema.      Left lower leg: No edema.   Skin:     General: Skin is warm and dry.   Neurological:      General: No focal deficit present.      Mental Status: He is alert.              CRANIAL NERVES     CN III, IV, VI   Pupils are equal, round, and reactive to light.       Significant Labs: All pertinent labs within the past 24 hours have been reviewed.  CMP:   Recent Labs   Lab 12/22/23  1840 12/23/23  0320 12/24/23  0502    140 138   K 4.4 4.1 4.1    105 103   CO2 24 25 27   * 173* 136*   BUN 19 18 21   CREATININE 1.1 1.2 1.2   CALCIUM 8.9 8.7 8.6*   PROT 7.2  --   --    ALBUMIN 3.7  --   " --    BILITOT 1.0  --   --    ALKPHOS 75  --   --    AST 19  --   --    ALT 16  --   --    ANIONGAP 9 10 8       Cardiac Markers:   Recent Labs   Lab 12/22/23  1840   *       Coagulation: No results for input(s): "PT", "INR", "APTT" in the last 48 hours.  Urine Studies:   Recent Labs   Lab 12/22/23  1931   COLORU Yellow   APPEARANCEUA Clear   PHUR 7.0   SPECGRAV 1.020   PROTEINUA 1+*   GLUCUA Negative   KETONESU Negative   BILIRUBINUA Negative   OCCULTUA Negative   NITRITE Negative   LEUKOCYTESUR Negative   RBCUA 0   WBCUA 0   BACTERIA None   SQUAMEPITHEL 0   HYALINECASTS 0         Significant Imaging: I have reviewed all pertinent imaging results/findings within the past 24 hours.    XR CHEST AP PORTABLE     CLINICAL HISTORY:  Shortness of breath     TECHNIQUE:  Single frontal view of the chest was performed.     COMPARISON:  Chest radiograph 06/01/2023, CT chest 09/18/2023     FINDINGS:  Monitoring leads overlie the chest.  Patient is rotated.  Resolution is limited by body habitus with underpenetration.     Cardiomediastinal silhouette is midline and prominent similar to prior.  Scattered linear opacities throughout the lungs consistent with minimal platelike scarring versus atelectasis.  Few scattered new subtle patchy subsegmental opacities at the bilateral mid to lower lung zones.  The lungs are otherwise well expanded without consolidation, pleural effusion or pneumothorax.  No acute osseous process seen.  PA and lateral views can be obtained.     Impression:     Bilateral mid to lower lung zone new patchy subtle opacities which may reflect subsegmental atelectasis versus multifocal airspace process including aspiration or pneumonia in the proper clinical setting.  Consider short-term follow-up chest radiography after therapy to ensure resolution.        Electronically signed by: Stan Braun MD  Date:                                            12/22/2023  Time:                                         "   19:39    Assessment/Plan:      HFrEF (heart failure with reduced ejection fraction)    -patient with orthopnea, dyspnea at rest and exertion  -hx of preserved EF  -patient with hx of aortic stenosis, Repeat ECHO showed severe reduced EF of 20-25%, moderate to severe low gradient aortic stenosis.  -s/p 40mg IV lasix in ED and diuresing - on 60mg IV lasix daily   -cardiology consulted appreciate recommendation    Nonrheumatic aortic valve stenosis  Echo showed severe reduced EF of 20-25%, moderate to severe low gradient aortic stenosis.      Anemia due to unknown mechanism  Patient's anemia is currently uncontrolled. Has not received any PRBCs to date. Etiology likely d/t Iron deficiency  Current CBC reviewed-   Lab Results   Component Value Date    HGB 8.7 (L) 12/23/2023    HCT 31.2 (L) 12/23/2023     Monitor serial CBC and transfuse if patient becomes hemodynamically unstable, symptomatic or H/H drops below 7/21.    S/P liver transplant  -continue tacrolimus  -hepatology following    Persistent atrial fibrillation  Patient with Persistent (7 days or more) atrial fibrillation which is controlled currently with Amiodarone. Patient is currently in atrial fibrillation.[id number] Score: 3. HASBLED Score: . Anticoagulation indicated. Anticoagulation done with apixaban .    Type 2 diabetes mellitus, with long-term current use of insulin  Patient's FSGs are controlled on current medication regimen.  Last A1c reviewed-   Lab Results   Component Value Date    HGBA1C 6.5 (H) 12/23/2023     Most recent fingerstick glucose reviewed-   Recent Labs   Lab 12/23/23  0153 12/23/23  0835 12/23/23  1233 12/23/23  1651   POCTGLUCOSE 151* 127* 131* 167*       Current correctional scale  Low  Maintain anti-hyperglycemic dose as follows-   Antihyperglycemics (From admission, onward)      Start     Stop Route Frequency Ordered    12/23/23 0715  insulin aspart U-100 pen 4 Units         -- SubQ 3 times daily with meals 12/23/23 0020     12/23/23 0120  insulin aspart U-100 pen 0-5 Units         -- SubQ Before meals & nightly PRN 12/23/23 0021 12/23/23 0030  insulin detemir U-100 (Levemir) pen 8 Units         -- SubQ 2 times daily 12/23/23 0020          Hold Oral hypoglycemics while patient is in the hospital.        Primary hypertension  Chronic, controlled. Latest blood pressure and vitals reviewed-     Temp:  [97.6 °F (36.4 °C)-98.5 °F (36.9 °C)]   Pulse:  []   Resp:  [15-18]   BP: (105-140)/(59-77)   SpO2:  [93 %-96 %] .   Home meds for hypertension were reviewed and noted below.   Hypertension Medications               amLODIPine (NORVASC) 10 MG tablet Take 10 mg by mouth once daily.            While in the hospital, will manage blood pressure as follows; Continue home antihypertensive regimen      VTE Risk Mitigation (From admission, onward)           Ordered     apixaban tablet 5 mg  2 times daily         12/23/23 0020     Reason for No Pharmacological VTE Prophylaxis  Once        Question:  Reasons:  Answer:  Already adequately anticoagulated on oral Anticoagulants    12/23/23 0020     IP VTE HIGH RISK PATIENT  Once         12/23/23 0020     Place sequential compression device  Until discontinued         12/23/23 0020                    Discharge Planning   FELISHA:      Code Status: Full Code   Is the patient medically ready for discharge?: No    Reason for patient still in hospital (select all that apply): Patient trending condition, Laboratory test, Treatment, and Consult recommendations                     Sherri Vanessa MD  Department of Hospital Medicine   [facility] - Intensive Care ([place])

## 2023-12-24 NOTE — SUBJECTIVE & OBJECTIVE
Interval History:  Patient reports significant improvement in breathing.  Urine output in past 24 hours 1.7 L.  Echo revealed significant decreased EF when compared to prior echo.    Review of Systems   Constitutional:  Positive for activity change. Negative for appetite change, chills and fever.   Respiratory:  Positive for shortness of breath. Negative for wheezing.    Cardiovascular:  Negative for chest pain and leg swelling.   Gastrointestinal:  Negative for abdominal pain, nausea and vomiting.   Genitourinary: Negative.    Musculoskeletal: Negative.    Skin: Negative.    Neurological:  Positive for weakness.     Objective:     Vital Signs (Most Recent):  Temp: 98.2 °F (36.8 °C) (12/23/23 1957)  Pulse: 109 (12/23/23 1957)  Resp: 18 (12/23/23 1957)  BP: 121/71 (12/23/23 1957)  SpO2: 95 % (12/23/23 1957) Vital Signs (24h Range):  Temp:  [97.6 °F (36.4 °C)-98.5 °F (36.9 °C)] 98.2 °F (36.8 °C)  Pulse:  [] 109  Resp:  [15-18] 18  SpO2:  [93 %-96 %] 95 %  BP: (105-140)/(59-77) 121/71     Weight: 99.8 kg (220 lb)  Body mass index is 29.84 kg/m².     Physical Exam  Vitals and nursing note reviewed.   Constitutional:       General: He is not in acute distress.  HENT:      Head: Normocephalic and atraumatic.   Eyes:      General: No scleral icterus.     Pupils: Pupils are equal, round, and reactive to light.   Cardiovascular:      Rate and Rhythm: Normal rate.      Pulses: Normal pulses.      Heart sounds: Murmur (systolic) heard.   Pulmonary:      Effort: Pulmonary effort is normal. No respiratory distress.      Breath sounds: Rales present. No wheezing.   Abdominal:      General: Bowel sounds are normal. There is no distension.      Palpations: Abdomen is soft.      Tenderness: There is no abdominal tenderness. There is no guarding.   Musculoskeletal:      Cervical back: Neck supple.      Right lower leg: No edema.      Left lower leg: No edema.   Skin:     General: Skin is warm and dry.   Neurological:       "General: No focal deficit present.      Mental Status: He is alert.              CRANIAL NERVES     CN III, IV, VI   Pupils are equal, round, and reactive to light.       Significant Labs: All pertinent labs within the past 24 hours have been reviewed.  CMP:   Recent Labs   Lab 12/22/23  1840 12/23/23  0320    140   K 4.4 4.1    105   CO2 24 25   * 173*   BUN 19 18   CREATININE 1.1 1.2   CALCIUM 8.9 8.7   PROT 7.2  --    ALBUMIN 3.7  --    BILITOT 1.0  --    ALKPHOS 75  --    AST 19  --    ALT 16  --    ANIONGAP 9 10       Cardiac Markers:   Recent Labs   Lab 12/22/23  1840   *       Coagulation: No results for input(s): "PT", "INR", "APTT" in the last 48 hours.  Urine Studies:   Recent Labs   Lab 12/22/23  1931   COLORU Yellow   APPEARANCEUA Clear   PHUR 7.0   SPECGRAV 1.020   PROTEINUA 1+*   GLUCUA Negative   KETONESU Negative   BILIRUBINUA Negative   OCCULTUA Negative   NITRITE Negative   LEUKOCYTESUR Negative   RBCUA 0   WBCUA 0   BACTERIA None   SQUAMEPITHEL 0   HYALINECASTS 0         Significant Imaging: I have reviewed all pertinent imaging results/findings within the past 24 hours.    XR CHEST AP PORTABLE     CLINICAL HISTORY:  Shortness of breath     TECHNIQUE:  Single frontal view of the chest was performed.     COMPARISON:  Chest radiograph 06/01/2023, CT chest 09/18/2023     FINDINGS:  Monitoring leads overlie the chest.  Patient is rotated.  Resolution is limited by body habitus with underpenetration.     Cardiomediastinal silhouette is midline and prominent similar to prior.  Scattered linear opacities throughout the lungs consistent with minimal platelike scarring versus atelectasis.  Few scattered new subtle patchy subsegmental opacities at the bilateral mid to lower lung zones.  The lungs are otherwise well expanded without consolidation, pleural effusion or pneumothorax.  No acute osseous process seen.  PA and lateral views can be obtained.     Impression:     Bilateral " mid to lower lung zone new patchy subtle opacities which may reflect subsegmental atelectasis versus multifocal airspace process including aspiration or pneumonia in the proper clinical setting.  Consider short-term follow-up chest radiography after therapy to ensure resolution.        Electronically signed by: Stan Braun MD  Date:                                            12/22/2023  Time:                                           19:39

## 2023-12-24 NOTE — HPI
68 y.o. male with history of HTN, HLD, DM2 , PAD, HCC s/p TACE/RFA (12/2020), and persistent AF ( s/p DCCV 5/2021 and 7/2021), CASTILLO cirrhosis OLT 1/6/2021 presented with complaints of fatigue and dyspnea.    He reports that he has been having progressive dyspnea over the last 3 days. At that time he had  episodes of chest tightness that has recurred with exertions since then.  Currently dyspnea present at rest, with minimal exertion - reporting walking 2-3 feet would cause dyspnea, he had orthopnea as well.  States he can't sleep on his side at home and has been getting up and sitting in his recliner.  Having difficulty sleeping and resulting fatigue from insomnia.   Orthopnea has improved after diuresis however still has dyspnea on exertion.      He is a former tobacco smoker, reports marijuana use(smoked) recently, no alcohol use       Echo showed severe reduced EF of 20-25%, moderate to severe low gradient aortic stenosis.

## 2023-12-24 NOTE — ASSESSMENT & PLAN NOTE
Chronic, controlled. Latest blood pressure and vitals reviewed-     Temp:  [97.6 °F (36.4 °C)-98.5 °F (36.9 °C)]   Pulse:  []   Resp:  [15-18]   BP: (105-140)/(59-77)   SpO2:  [93 %-96 %] .   Home meds for hypertension were reviewed and noted below.   Hypertension Medications               amLODIPine (NORVASC) 10 MG tablet Take 10 mg by mouth once daily.            While in the hospital, will manage blood pressure as follows; Continue home antihypertensive regimen

## 2023-12-24 NOTE — PLAN OF CARE
Pt AAO x 4, VS monitored and stable. Pt ambulates in room w/o difficulty/. Medications given as ordered. Blood sugars monitored. Fluid restriction enforced. No concerns at this time. Side rails up x 2, urinal at bedside, bed locked and in lowest position. Call light within reach.     Problem: Adult Inpatient Plan of Care  Goal: Plan of Care Review  Outcome: Ongoing, Progressing  Goal: Patient-Specific Goal (Individualized)  Outcome: Ongoing, Progressing  Goal: Absence of Hospital-Acquired Illness or Injury  Outcome: Ongoing, Progressing  Intervention: Prevent Skin Injury  Flowsheets (Taken 12/24/2023 0344)  Body Position: position changed independently  Skin Protection: incontinence pads utilized  Intervention: Prevent and Manage VTE (Venous Thromboembolism) Risk  Flowsheets (Taken 12/24/2023 0344)  Activity Management: Ambulated to bathroom - L4  VTE Prevention/Management:   bleeding precations maintained   bleeding risk assessed  Range of Motion: active ROM (range of motion) encouraged  Intervention: Prevent Infection  Flowsheets (Taken 12/24/2023 0344)  Infection Prevention:   hand hygiene promoted   single patient room provided  Goal: Optimal Comfort and Wellbeing  Outcome: Ongoing, Progressing  Intervention: Monitor Pain and Promote Comfort  Flowsheets (Taken 12/24/2023 0344)  Pain Management Interventions: care clustered  Intervention: Provide Person-Centered Care  Flowsheets (Taken 12/24/2023 0344)  Trust Relationship/Rapport:   care explained   thoughts/feelings acknowledged  Goal: Readiness for Transition of Care  Outcome: Ongoing, Progressing     Problem: Diabetes Comorbidity  Goal: Blood Glucose Level Within Targeted Range  Outcome: Ongoing, Progressing  Intervention: Monitor and Manage Glycemia  Flowsheets (Taken 12/24/2023 0344)  Glycemic Management: blood glucose monitored     Problem: Infection  Goal: Absence of Infection Signs and Symptoms  Outcome: Ongoing, Progressing     Problem: Fall Injury  Risk  Goal: Absence of Fall and Fall-Related Injury  Outcome: Ongoing, Progressing  Intervention: Identify and Manage Contributors  Flowsheets (Taken 12/24/2023 3284)  Self-Care Promotion: independence encouraged  Medication Review/Management: medications reviewed

## 2023-12-24 NOTE — SUBJECTIVE & OBJECTIVE
Interval History:  Patient reports significant improvement in breathing.  Cardiology consulted.  Wife was updated over phone.      Review of Systems   Constitutional:  Positive for activity change. Negative for appetite change, chills and fever.   Respiratory:  Positive for shortness of breath. Negative for wheezing.    Cardiovascular:  Negative for chest pain and leg swelling.   Gastrointestinal:  Negative for abdominal pain, nausea and vomiting.   Genitourinary: Negative.    Musculoskeletal: Negative.    Skin: Negative.    Neurological:  Positive for weakness.     Objective:     Vital Signs (Most Recent):  Temp: 98 °F (36.7 °C) (12/24/23 0808)  Pulse: 109 (12/24/23 1136)  Resp: 16 (12/24/23 0808)  BP: 109/72 (12/24/23 0808)  SpO2: (!) 94 % (12/24/23 0808) Vital Signs (24h Range):  Temp:  [98 °F (36.7 °C)-98.5 °F (36.9 °C)] 98 °F (36.7 °C)  Pulse:  [] 109  Resp:  [15-18] 16  SpO2:  [92 %-96 %] 94 %  BP: ()/(52-76) 109/72     Weight: 99.8 kg (220 lb)  Body mass index is 29.84 kg/m².     Physical Exam  Vitals and nursing note reviewed.   Constitutional:       General: He is not in acute distress.  HENT:      Head: Normocephalic and atraumatic.   Eyes:      General: No scleral icterus.     Pupils: Pupils are equal, round, and reactive to light.   Cardiovascular:      Rate and Rhythm: Normal rate.      Pulses: Normal pulses.      Heart sounds: Murmur (systolic) heard.   Pulmonary:      Effort: Pulmonary effort is normal. No respiratory distress.      Breath sounds: Rales present. No wheezing.   Abdominal:      General: Bowel sounds are normal. There is no distension.      Palpations: Abdomen is soft.      Tenderness: There is no abdominal tenderness. There is no guarding.   Musculoskeletal:      Cervical back: Neck supple.      Right lower leg: No edema.      Left lower leg: No edema.   Skin:     General: Skin is warm and dry.   Neurological:      General: No focal deficit present.      Mental Status: He is  "alert.              CRANIAL NERVES     CN III, IV, VI   Pupils are equal, round, and reactive to light.       Significant Labs: All pertinent labs within the past 24 hours have been reviewed.  CMP:   Recent Labs   Lab 12/22/23  1840 12/23/23  0320 12/24/23  0502    140 138   K 4.4 4.1 4.1    105 103   CO2 24 25 27   * 173* 136*   BUN 19 18 21   CREATININE 1.1 1.2 1.2   CALCIUM 8.9 8.7 8.6*   PROT 7.2  --   --    ALBUMIN 3.7  --   --    BILITOT 1.0  --   --    ALKPHOS 75  --   --    AST 19  --   --    ALT 16  --   --    ANIONGAP 9 10 8       Cardiac Markers:   Recent Labs   Lab 12/22/23  1840   *       Coagulation: No results for input(s): "PT", "INR", "APTT" in the last 48 hours.  Urine Studies:   Recent Labs   Lab 12/22/23  1931   COLORU Yellow   APPEARANCEUA Clear   PHUR 7.0   SPECGRAV 1.020   PROTEINUA 1+*   GLUCUA Negative   KETONESU Negative   BILIRUBINUA Negative   OCCULTUA Negative   NITRITE Negative   LEUKOCYTESUR Negative   RBCUA 0   WBCUA 0   BACTERIA None   SQUAMEPITHEL 0   HYALINECASTS 0         Significant Imaging: I have reviewed all pertinent imaging results/findings within the past 24 hours.    XR CHEST AP PORTABLE     CLINICAL HISTORY:  Shortness of breath     TECHNIQUE:  Single frontal view of the chest was performed.     COMPARISON:  Chest radiograph 06/01/2023, CT chest 09/18/2023     FINDINGS:  Monitoring leads overlie the chest.  Patient is rotated.  Resolution is limited by body habitus with underpenetration.     Cardiomediastinal silhouette is midline and prominent similar to prior.  Scattered linear opacities throughout the lungs consistent with minimal platelike scarring versus atelectasis.  Few scattered new subtle patchy subsegmental opacities at the bilateral mid to lower lung zones.  The lungs are otherwise well expanded without consolidation, pleural effusion or pneumothorax.  No acute osseous process seen.  PA and lateral views can be obtained.   "   Impression:     Bilateral mid to lower lung zone new patchy subtle opacities which may reflect subsegmental atelectasis versus multifocal airspace process including aspiration or pneumonia in the proper clinical setting.  Consider short-term follow-up chest radiography after therapy to ensure resolution.        Electronically signed by: Stan Braun MD  Date:                                            12/22/2023  Time:                                           19:39

## 2023-12-24 NOTE — TREATMENT PLAN
Hepatology Treatment Plan    Tej Purdy is a 68 y.o. male admitted to hospital 2023 (Hospital Day: 3) due to Acute diastolic (congestive) heart failure.     Interval History  No acute events overnight.  Patient alert and oriented x4 this a.m..    Prograf level 9.1.  CA 19-9 and CEA within normal limits.    Doppler evaluation of liver was satisfactory with new mild intrahepatic ductal dilation in the left hepatic lobe.      Objective  Temp:  [98 °F (36.7 °C)-98.5 °F (36.9 °C)] 98 °F (36.7 °C) (808)  Pulse:  [] 109 ( 1136)  BP: ()/(52-76) 109/72 (808)  Resp:  [15-18] 16 (808)  SpO2:  [92 %-96 %] 94 % (808)        Laboratory    Lab Results   Component Value Date    WBC 5.87 2023    HGB 8.6 (L) 2023    HCT 30.9 (L) 2023    MCV 68 (L) 2023     (L) 2023       Lab Results   Component Value Date     2023    K 4.1 2023     2023    CO2 27 2023    BUN 21 2023    CREATININE 1.2 2023    CALCIUM 8.6 (L) 2023       Lab Results   Component Value Date    ALBUMIN 3.7 2023    ALT 16 2023    AST 19 2023    GGT 48 2021    ALKPHOS 75 2023    BILITOT 1.0 2023       Lab Results   Component Value Date    INR 1.3 (H) 2023    INR 1.2 2022    INR 1.2 2022       MELD 3.0: 14 at 6/3/2023  4:18 AM  MELD-Na: 14 at 6/3/2023  4:18 AM  Calculated from:  Serum Creatinine: 1.4 mg/dL at 6/3/2023  4:18 AM  Serum Sodium: 136 mmol/L at 6/3/2023  4:18 AM  Total Bilirubin: 0.5 mg/dL (Using min of 1 mg/dL) at 6/3/2023  4:18 AM  Serum Albumin: 2.7 g/dL at 6/3/2023  4:18 AM  INR(ratio): 1.3 at 2023 11:09 PM  Age at listin years  Sex: Male at 6/3/2023  4:18 AM      Assessment  Tej Purdy is a 68 y.o. male with history of history of cholangiocarcinoma (status post liver transplant in 2022),  Aortic Stenosis, Type 2 Diabetes, Hypertension presents to  the ED with CHF exacerbation. Labs show excellent allograft function on prograf monotherapy. Patient reports good compliance. Follows up with Dr. Maxwell in clinic.  Hepatology consulted for IS management. Doppler evaluation of liver was satisfactory with new mild intrahepatic ductal dilation in the left hepatic lobe; trace ascites in LLQ. CA 19-9 and CEA within normal limits.        Problem List:  Cholangiocarcinoma (status post liver transplant in 01/2022)  On chronic immunosuppression    Plan  - Decrease Prograf from 1 mg BID to 0.5 mg BID.   - please obtain daily CBC, CMP, AM Prograf level  - Plan of care was discussed with primary team    Thank you for involving us in the care of Tej Purdy. Please call with any additional concerns or questions.    Radames Lam MD, PGY-V  Gastroenterology Fellow  [facility]

## 2023-12-24 NOTE — ASSESSMENT & PLAN NOTE
-patient with orthopnea, dyspnea at rest and exertion  -hx of preserved EF  -patient with hx of aortic stenosis, Repeat ECHO showed severe reduced EF of 20-25%, moderate to severe low gradient aortic stenosis.  -s/p 40mg IV lasix in ED and diuresing - on 60mg IV lasix daily

## 2023-12-24 NOTE — HOSPITAL COURSE
Echocardiogram showed severely reduced ejection fraction of 20 to 25%, moderate to severe low gradient aortic stenosis. Cardiology was consulted. He had Q waves on EKG. There was concern that he had a myocardial infarction. His amiodarone was discontinued. Interventional Cardiology was consulted. He was started on aspirin, a statin, and a beta-blocker. He underwent left heart catheterization via right radial artery showing 90% stenosis in the lateral circumflex artery. A stent was placed. He was started on dual antiplatelet therapy and isosorbide mononitrate. On 12/27/2023, he started having dizziness after taking his morning medications. Blood pressures were as low as 90s/50s while supine. The same happened the next morning, even without furosemide. Isosorbide mononitrate was stopped. He was orthostatic. Isosorbide mononitrate was stopped. He was given 250 mL of normal saline. He also developed new right lower quadrant abdominal pain that felt similar to prior nephrolithiasis. CT showed bilateral nephrolithiasis without evidence of obstruction. He continued to feel lightheaded with low blood pressures when standing. He was given 500 mL of normal saline. He also had shortness of breath and became hypoxic. Repeat chest X-ray showed nothing new. BNP was higher at 743 pg/mL, but his net fluid status was negative as he had produced more urine in relation to the small amounts of intravenous fluids he was given. D-dimer was elevated at 1.86 mg/L. Chest CTA showed no pulmonary embolism, instead showing interstitial pulmonary edema and trace bilateral pleural effusions and enlarged main pulmonary artery suggesting pulmonary hypertension. Metoprolol dose was decreased. He was put on midodrine, which improved supine blood pressure to 140/83, with standing blood pressure of 105/55. He still felt lightheadedness. He was still hypoxic. Midodrine dose was increased and diuresis with furosemide was resumed. He developed right  second toe pain and swelling at the toenail the night of 12/30/2023. Topical mupirocin was applied. He had a temperature of 100.8° Fahrenheit on 1/1/2024. He felt well and had no new symptoms. On the contrary, his toe pain improved. On 1/3/2024 he was taken off supplemental oxygen and felt well enough to go home.

## 2023-12-24 NOTE — ASSESSMENT & PLAN NOTE
Patient with Persistent (7 days or more) atrial fibrillation which is controlled currently with Amiodarone. Patient is currently in atrial fibrillation.[id number] Score: 3. HASBLED Score: . Anticoagulation indicated. Anticoagulation done with apixaban .

## 2023-12-24 NOTE — PROGRESS NOTES
[facility] - Intensive Care ([place] [id number])  [facility] Medicine  Progress Note    Patient Name: Tej Purdy  MRN: [id number]  Patient Class: IP- Inpatient   Admission Date: 12/22/2023  Length of Stay: 0 days  Attending Physician: Sherri Vanessa MD  Primary Care Provider: Thais Maxwell MD        Subjective:     Principal Problem:Acute diastolic (congestive) heart failure        HPI:  68-year-old WM, with history CASTILLO cirrhosis s/p Liver Transplant, Aortic Stenosis, Type 2 Diabetes, Hypertension presents to the ED with complaints of fatigue and dyspnea.   He reports that he has been having progressive dyspnea over the last 3 days.  Currently dyspnea present at rest, with minimal exertion - reporting walking 2-3 feet would cause dyspnea, he has orthopnea as well.  States he can't sleep on his side at home and has been getting up and sitting in his recliner.  Having difficulty sleeping and resulting fatigue from insomnia.      He is not taking diuretics as outpatient regularly, his wife assists in organizing/managing his medications, she is not present at bedside tonight.   He denies any chest pain, has had some chest tightness this evening.   He has received 40mg IV lasix in the ED and during my interview had total 900cc UOP in urinal (approx 23:00)     He is a former tobacco smoker, reports marijuana use(smoked) recently, no alcohol use       Overview/Hospital Course:  Echo showed severe reduced EF of 20-25%, moderate to severe low gradient aortic stenosis.    Interval History:  Patient reports significant improvement in breathing.  Urine output in past 24 hours 1.7 L.  Echo revealed significant decreased EF when compared to prior echo.    Review of Systems   Constitutional:  Positive for activity change. Negative for appetite change, chills and fever.   Respiratory:  Positive for shortness of breath. Negative for wheezing.    Cardiovascular:  Negative for chest pain and leg swelling.   Gastrointestinal:   Negative for abdominal pain, nausea and vomiting.   Genitourinary: Negative.    Musculoskeletal: Negative.    Skin: Negative.    Neurological:  Positive for weakness.     Objective:     Vital Signs (Most Recent):  Temp: 98.2 °F (36.8 °C) (12/23/23 1957)  Pulse: 109 (12/23/23 1957)  Resp: 18 (12/23/23 1957)  BP: 121/71 (12/23/23 1957)  SpO2: 95 % (12/23/23 1957) Vital Signs (24h Range):  Temp:  [97.6 °F (36.4 °C)-98.5 °F (36.9 °C)] 98.2 °F (36.8 °C)  Pulse:  [] 109  Resp:  [15-18] 18  SpO2:  [93 %-96 %] 95 %  BP: (105-140)/(59-77) 121/71     Weight: 99.8 kg (220 lb)  Body mass index is 29.84 kg/m².     Physical Exam  Vitals and nursing note reviewed.   Constitutional:       General: He is not in acute distress.  HENT:      Head: Normocephalic and atraumatic.   Eyes:      General: No scleral icterus.     Pupils: Pupils are equal, round, and reactive to light.   Cardiovascular:      Rate and Rhythm: Normal rate.      Pulses: Normal pulses.      Heart sounds: Murmur (systolic) heard.   Pulmonary:      Effort: Pulmonary effort is normal. No respiratory distress.      Breath sounds: Rales present. No wheezing.   Abdominal:      General: Bowel sounds are normal. There is no distension.      Palpations: Abdomen is soft.      Tenderness: There is no abdominal tenderness. There is no guarding.   Musculoskeletal:      Cervical back: Neck supple.      Right lower leg: No edema.      Left lower leg: No edema.   Skin:     General: Skin is warm and dry.   Neurological:      General: No focal deficit present.      Mental Status: He is alert.              CRANIAL NERVES     CN III, IV, VI   Pupils are equal, round, and reactive to light.       Significant Labs: All pertinent labs within the past 24 hours have been reviewed.  CMP:   Recent Labs   Lab 12/22/23  1840 12/23/23  0320    140   K 4.4 4.1    105   CO2 24 25   * 173*   BUN 19 18   CREATININE 1.1 1.2   CALCIUM 8.9 8.7   PROT 7.2  --    ALBUMIN 3.7  --  "   BILITOT 1.0  --    ALKPHOS 75  --    AST 19  --    ALT 16  --    ANIONGAP 9 10       Cardiac Markers:   Recent Labs   Lab 12/22/23  1840   *       Coagulation: No results for input(s): "PT", "INR", "APTT" in the last 48 hours.  Urine Studies:   Recent Labs   Lab 12/22/23  1931   COLORU Yellow   APPEARANCEUA Clear   PHUR 7.0   SPECGRAV 1.020   PROTEINUA 1+*   GLUCUA Negative   KETONESU Negative   BILIRUBINUA Negative   OCCULTUA Negative   NITRITE Negative   LEUKOCYTESUR Negative   RBCUA 0   WBCUA 0   BACTERIA None   SQUAMEPITHEL 0   HYALINECASTS 0         Significant Imaging: I have reviewed all pertinent imaging results/findings within the past 24 hours.    XR CHEST AP PORTABLE     CLINICAL HISTORY:  Shortness of breath     TECHNIQUE:  Single frontal view of the chest was performed.     COMPARISON:  Chest radiograph 06/01/2023, CT chest 09/18/2023     FINDINGS:  Monitoring leads overlie the chest.  Patient is rotated.  Resolution is limited by body habitus with underpenetration.     Cardiomediastinal silhouette is midline and prominent similar to prior.  Scattered linear opacities throughout the lungs consistent with minimal platelike scarring versus atelectasis.  Few scattered new subtle patchy subsegmental opacities at the bilateral mid to lower lung zones.  The lungs are otherwise well expanded without consolidation, pleural effusion or pneumothorax.  No acute osseous process seen.  PA and lateral views can be obtained.     Impression:     Bilateral mid to lower lung zone new patchy subtle opacities which may reflect subsegmental atelectasis versus multifocal airspace process including aspiration or pneumonia in the proper clinical setting.  Consider short-term follow-up chest radiography after therapy to ensure resolution.        Electronically signed by: Stan Braun MD  Date:                                            12/22/2023  Time:                                           " 19:39    Assessment/Plan:      * Acute diastolic (congestive) heart failure  -patient with orthopnea, dyspnea at rest and exertion  -hx of preserved EF  -patient with hx of aortic stenosis, Repeat ECHO showed severe reduced EF of 20-25%, moderate to severe low gradient aortic stenosis.  -s/p 40mg IV lasix in ED and diuresing - on 60mg IV lasix daily       Nonrheumatic aortic valve stenosis  Echo showed severe reduced EF of 20-25%, moderate to severe low gradient aortic stenosis.      Anemia due to unknown mechanism  Patient's anemia is currently uncontrolled. Has not received any PRBCs to date. Etiology likely d/t Iron deficiency  Current CBC reviewed-   Lab Results   Component Value Date    HGB 8.7 (L) 12/23/2023    HCT 31.2 (L) 12/23/2023     Monitor serial CBC and transfuse if patient becomes hemodynamically unstable, symptomatic or H/H drops below 7/21.    S/P liver transplant  -continue tacrolimus  -hepatology following    Persistent atrial fibrillation  Patient with Persistent (7 days or more) atrial fibrillation which is controlled currently with Amiodarone. Patient is currently in atrial fibrillation.[id number] Score: 3. HASBLED Score: . Anticoagulation indicated. Anticoagulation done with apixaban .    Type 2 diabetes mellitus, with long-term current use of insulin  Patient's FSGs are controlled on current medication regimen.  Last A1c reviewed-   Lab Results   Component Value Date    HGBA1C 6.5 (H) 12/23/2023     Most recent fingerstick glucose reviewed-   Recent Labs   Lab 12/23/23  0153 12/23/23  0835 12/23/23  1233 12/23/23  1651   POCTGLUCOSE 151* 127* 131* 167*       Current correctional scale  Low  Maintain anti-hyperglycemic dose as follows-   Antihyperglycemics (From admission, onward)      Start     Stop Route Frequency Ordered    12/23/23 0715  insulin aspart U-100 pen 4 Units         -- SubQ 3 times daily with meals 12/23/23 0020    12/23/23 0120  insulin aspart U-100 pen 0-5 Units         --  SubQ Before meals & nightly PRN 12/23/23 0021 12/23/23 0030  insulin detemir U-100 (Levemir) pen 8 Units         -- SubQ 2 times daily 12/23/23 0020          Hold Oral hypoglycemics while patient is in the hospital.        Primary hypertension  Chronic, controlled. Latest blood pressure and vitals reviewed-     Temp:  [97.6 °F (36.4 °C)-98.5 °F (36.9 °C)]   Pulse:  []   Resp:  [15-18]   BP: (105-140)/(59-77)   SpO2:  [93 %-96 %] .   Home meds for hypertension were reviewed and noted below.   Hypertension Medications               amLODIPine (NORVASC) 10 MG tablet Take 10 mg by mouth once daily.            While in the hospital, will manage blood pressure as follows; Continue home antihypertensive regimen      VTE Risk Mitigation (From admission, onward)           Ordered     apixaban tablet 5 mg  2 times daily         12/23/23 0020     Reason for No Pharmacological VTE Prophylaxis  Once        Question:  Reasons:  Answer:  Already adequately anticoagulated on oral Anticoagulants    12/23/23 0020     IP VTE HIGH RISK PATIENT  Once         12/23/23 0020     Place sequential compression device  Until discontinued         12/23/23 0020                    Discharge Planning   FELISHA:      Code Status: Full Code   Is the patient medically ready for discharge?: No    Reason for patient still in hospital (select all that apply): Patient trending condition, Treatment, and Consult recommendations                     Sherri Vanessa MD  Department of Hospital Medicine   [facility] - Intensive Care ([place]-[id number])

## 2023-12-24 NOTE — ASSESSMENT & PLAN NOTE
Patient's FSGs are controlled on current medication regimen.  Last A1c reviewed-   Lab Results   Component Value Date    HGBA1C 6.5 (H) 12/23/2023     Most recent fingerstick glucose reviewed-   Recent Labs   Lab 12/23/23  0153 12/23/23  0835 12/23/23  1233 12/23/23  1651   POCTGLUCOSE 151* 127* 131* 167*       Current correctional scale  Low  Maintain anti-hyperglycemic dose as follows-   Antihyperglycemics (From admission, onward)    Start     Stop Route Frequency Ordered    12/23/23 0715  insulin aspart U-100 pen 4 Units         -- SubQ 3 times daily with meals 12/23/23 0020    12/23/23 0120  insulin aspart U-100 pen 0-5 Units         -- SubQ Before meals & nightly PRN 12/23/23 0021    12/23/23 0030  insulin detemir U-100 (Levemir) pen 8 Units         -- SubQ 2 times daily 12/23/23 0020        Hold Oral hypoglycemics while patient is in the hospital.

## 2023-12-25 PROBLEM — I20.0 UNSTABLE ANGINA: Status: ACTIVE | Noted: 2023-12-25

## 2023-12-25 LAB
ALBUMIN SERPL BCP-MCNC: 3.4 G/DL (ref 3.5–5.2)
ALP SERPL-CCNC: 69 U/L (ref 55–135)
ALT SERPL W/O P-5'-P-CCNC: 12 U/L (ref 10–44)
ANION GAP SERPL CALC-SCNC: 11 MMOL/L (ref 8–16)
AST SERPL-CCNC: 14 U/L (ref 10–40)
BASOPHILS # BLD AUTO: 0.03 K/UL (ref 0–0.2)
BASOPHILS NFR BLD: 0.6 % (ref 0–1.9)
BILIRUB DIRECT SERPL-MCNC: 0.2 MG/DL (ref 0.1–0.3)
BILIRUB SERPL-MCNC: 0.7 MG/DL (ref 0.1–1)
BUN SERPL-MCNC: 22 MG/DL (ref 8–23)
CALCIUM SERPL-MCNC: 8.6 MG/DL (ref 8.7–10.5)
CHLORIDE SERPL-SCNC: 101 MMOL/L (ref 95–110)
CO2 SERPL-SCNC: 25 MMOL/L (ref 23–29)
CREAT SERPL-MCNC: 1.2 MG/DL (ref 0.5–1.4)
DIFFERENTIAL METHOD BLD: ABNORMAL
EOSINOPHIL # BLD AUTO: 0.2 K/UL (ref 0–0.5)
EOSINOPHIL NFR BLD: 2.8 % (ref 0–8)
ERYTHROCYTE [DISTWIDTH] IN BLOOD BY AUTOMATED COUNT: 20.2 % (ref 11.5–14.5)
EST. GFR  (NO RACE VARIABLE): >60 ML/MIN/1.73 M^2
GLUCOSE SERPL-MCNC: 154 MG/DL (ref 70–110)
HCT VFR BLD AUTO: 30.9 % (ref 40–54)
HGB BLD-MCNC: 8.6 G/DL (ref 14–18)
IMM GRANULOCYTES # BLD AUTO: 0.05 K/UL (ref 0–0.04)
IMM GRANULOCYTES NFR BLD AUTO: 0.9 % (ref 0–0.5)
LYMPHOCYTES # BLD AUTO: 1.1 K/UL (ref 1–4.8)
LYMPHOCYTES NFR BLD: 20.2 % (ref 18–48)
MAGNESIUM SERPL-MCNC: 1.7 MG/DL (ref 1.6–2.6)
MCH RBC QN AUTO: 19.1 PG (ref 27–31)
MCHC RBC AUTO-ENTMCNC: 27.8 G/DL (ref 32–36)
MCV RBC AUTO: 69 FL (ref 82–98)
MONOCYTES # BLD AUTO: 0.7 K/UL (ref 0.3–1)
MONOCYTES NFR BLD: 13.6 % (ref 4–15)
NEUTROPHILS # BLD AUTO: 3.4 K/UL (ref 1.8–7.7)
NEUTROPHILS NFR BLD: 61.9 % (ref 38–73)
NRBC BLD-RTO: 0 /100 WBC
PHOSPHATE SERPL-MCNC: 3.2 MG/DL (ref 2.7–4.5)
PLATELET # BLD AUTO: 128 K/UL (ref 150–450)
PMV BLD AUTO: ABNORMAL FL (ref 9.2–12.9)
POCT GLUCOSE: 134 MG/DL (ref 70–110)
POCT GLUCOSE: 144 MG/DL (ref 70–110)
POCT GLUCOSE: 181 MG/DL (ref 70–110)
POTASSIUM SERPL-SCNC: 4 MMOL/L (ref 3.5–5.1)
PROT SERPL-MCNC: 6.4 G/DL (ref 6–8.4)
RBC # BLD AUTO: 4.5 M/UL (ref 4.6–6.2)
SODIUM SERPL-SCNC: 137 MMOL/L (ref 136–145)
WBC # BLD AUTO: 5.45 K/UL (ref 3.9–12.7)

## 2023-12-25 PROCEDURE — 85025 COMPLETE CBC W/AUTO DIFF WBC: CPT | Performed by: HOSPITALIST

## 2023-12-25 PROCEDURE — 80048 BASIC METABOLIC PNL TOTAL CA: CPT | Performed by: HOSPITALIST

## 2023-12-25 PROCEDURE — 25000003 PHARM REV CODE 250: Performed by: HOSPITALIST

## 2023-12-25 PROCEDURE — 63600175 PHARM REV CODE 636 W HCPCS: Performed by: HOSPITALIST

## 2023-12-25 PROCEDURE — 80076 HEPATIC FUNCTION PANEL: CPT | Performed by: INTERNAL MEDICINE

## 2023-12-25 PROCEDURE — 21400001 HC TELEMETRY ROOM

## 2023-12-25 PROCEDURE — 63600175 PHARM REV CODE 636 W HCPCS: Performed by: STUDENT IN AN ORGANIZED HEALTH CARE EDUCATION/TRAINING PROGRAM

## 2023-12-25 PROCEDURE — 25000003 PHARM REV CODE 250: Performed by: STUDENT IN AN ORGANIZED HEALTH CARE EDUCATION/TRAINING PROGRAM

## 2023-12-25 PROCEDURE — 84100 ASSAY OF PHOSPHORUS: CPT | Performed by: HOSPITALIST

## 2023-12-25 PROCEDURE — 36415 COLL VENOUS BLD VENIPUNCTURE: CPT | Performed by: HOSPITALIST

## 2023-12-25 PROCEDURE — 83735 ASSAY OF MAGNESIUM: CPT | Performed by: HOSPITALIST

## 2023-12-25 PROCEDURE — 99232 SBSQ HOSP IP/OBS MODERATE 35: CPT | Mod: GC,,, | Performed by: INTERNAL MEDICINE

## 2023-12-25 RX ORDER — FUROSEMIDE 10 MG/ML
40 INJECTION INTRAMUSCULAR; INTRAVENOUS DAILY
Status: DISCONTINUED | OUTPATIENT
Start: 2023-12-26 | End: 2023-12-28

## 2023-12-25 RX ORDER — ATORVASTATIN CALCIUM 40 MG/1
80 TABLET, FILM COATED ORAL DAILY
Status: DISCONTINUED | OUTPATIENT
Start: 2023-12-25 | End: 2024-01-02 | Stop reason: HOSPADM

## 2023-12-25 RX ORDER — ASPIRIN 325 MG
325 TABLET ORAL ONCE
Status: COMPLETED | OUTPATIENT
Start: 2023-12-25 | End: 2023-12-25

## 2023-12-25 RX ORDER — ISOSORBIDE MONONITRATE 30 MG/1
30 TABLET, EXTENDED RELEASE ORAL DAILY
Status: DISCONTINUED | OUTPATIENT
Start: 2023-12-25 | End: 2023-12-28

## 2023-12-25 RX ADMIN — Medication 800 MG: at 09:12

## 2023-12-25 RX ADMIN — PANTOPRAZOLE SODIUM 40 MG: 40 TABLET, DELAYED RELEASE ORAL at 09:12

## 2023-12-25 RX ADMIN — INSULIN ASPART 4 UNITS: 100 INJECTION, SOLUTION INTRAVENOUS; SUBCUTANEOUS at 12:12

## 2023-12-25 RX ADMIN — INSULIN ASPART 4 UNITS: 100 INJECTION, SOLUTION INTRAVENOUS; SUBCUTANEOUS at 05:12

## 2023-12-25 RX ADMIN — TACROLIMUS 0.5 MG: 0.5 CAPSULE ORAL at 09:12

## 2023-12-25 RX ADMIN — APIXABAN 5 MG: 5 TABLET, FILM COATED ORAL at 08:12

## 2023-12-25 RX ADMIN — APIXABAN 5 MG: 5 TABLET, FILM COATED ORAL at 09:12

## 2023-12-25 RX ADMIN — INSULIN ASPART 4 UNITS: 100 INJECTION, SOLUTION INTRAVENOUS; SUBCUTANEOUS at 09:12

## 2023-12-25 RX ADMIN — TACROLIMUS 0.5 MG: 0.5 CAPSULE ORAL at 05:12

## 2023-12-25 RX ADMIN — ISOSORBIDE MONONITRATE 30 MG: 30 TABLET, EXTENDED RELEASE ORAL at 02:12

## 2023-12-25 RX ADMIN — QUETIAPINE FUMARATE 100 MG: 25 TABLET ORAL at 08:12

## 2023-12-25 RX ADMIN — SERTRALINE HYDROCHLORIDE 25 MG: 25 TABLET ORAL at 09:12

## 2023-12-25 RX ADMIN — INSULIN DETEMIR 8 UNITS: 100 INJECTION, SOLUTION SUBCUTANEOUS at 09:12

## 2023-12-25 RX ADMIN — INSULIN DETEMIR 8 UNITS: 100 INJECTION, SOLUTION SUBCUTANEOUS at 08:12

## 2023-12-25 RX ADMIN — Medication 800 MG: at 02:12

## 2023-12-25 RX ADMIN — AMLODIPINE BESYLATE 10 MG: 10 TABLET ORAL at 09:12

## 2023-12-25 RX ADMIN — THERA TABS 1 TABLET: TAB at 09:12

## 2023-12-25 RX ADMIN — ATORVASTATIN CALCIUM 80 MG: 40 TABLET, FILM COATED ORAL at 02:12

## 2023-12-25 RX ADMIN — ASPIRIN 325 MG ORAL TABLET 325 MG: 325 PILL ORAL at 02:12

## 2023-12-25 RX ADMIN — FUROSEMIDE 60 MG: 10 INJECTION, SOLUTION INTRAMUSCULAR; INTRAVENOUS at 09:12

## 2023-12-25 RX ADMIN — Medication 800 MG: at 08:12

## 2023-12-25 RX ADMIN — BUPROPION HYDROCHLORIDE 150 MG: 150 TABLET, FILM COATED, EXTENDED RELEASE ORAL at 06:12

## 2023-12-25 RX ADMIN — Medication 1 CAPSULE: at 09:12

## 2023-12-25 NOTE — ASSESSMENT & PLAN NOTE
Patient's anemia is currently uncontrolled. Has not received any PRBCs to date. Etiology likely d/t Iron deficiency  Current CBC reviewed-   Lab Results   Component Value Date    HGB 8.6 (L) 12/25/2023    HCT 30.9 (L) 12/25/2023     Monitor serial CBC and transfuse if patient becomes hemodynamically unstable, symptomatic or H/H drops below 7/21.

## 2023-12-25 NOTE — TREATMENT PLAN
Hepatology Treatment Plan    Tej Purdy is a 68 y.o. male admitted to hospital 2023 (Hospital Day: 4) due to Acute diastolic (congestive) heart failure.     Interval History  No acute events overnight.  EF 20-25%, moderate to severe AS on TTE.     Prograf level 9.1 yesterday; level pending today.      AST 14, ALT 12, T bili 0.7    Objective  Temp:  [97.6 °F (36.4 °C)-98.8 °F (37.1 °C)] 98 °F (36.7 °C) (1143)  Pulse:  [] 103 (1143)  BP: (100-124)/(57-75) 108/67 (1143)  Resp:  [16-18] 18 (1143)  SpO2:  [92 %-96 %] 92 % (1143)        Laboratory    Lab Results   Component Value Date    WBC 5.45 2023    HGB 8.6 (L) 2023    HCT 30.9 (L) 2023    MCV 69 (L) 2023     (L) 2023       Lab Results   Component Value Date     2023    K 4.0 2023     2023    CO2 25 2023    BUN 22 2023    CREATININE 1.2 2023    CALCIUM 8.6 (L) 2023       Lab Results   Component Value Date    ALBUMIN 3.4 (L) 2023    ALT 12 2023    AST 14 2023    GGT 48 2021    ALKPHOS 69 2023    BILITOT 0.7 2023       Lab Results   Component Value Date    INR 1.3 (H) 2023    INR 1.2 2022    INR 1.2 2022       MELD 3.0: 14 at 6/3/2023  4:18 AM  MELD-Na: 14 at 6/3/2023  4:18 AM  Calculated from:  Serum Creatinine: 1.4 mg/dL at 6/3/2023  4:18 AM  Serum Sodium: 136 mmol/L at 6/3/2023  4:18 AM  Total Bilirubin: 0.5 mg/dL (Using min of 1 mg/dL) at 6/3/2023  4:18 AM  Serum Albumin: 2.7 g/dL at 6/3/2023  4:18 AM  INR(ratio): 1.3 at 2023 11:09 PM  Age at listin years  Sex: Male at 6/3/2023  4:18 AM      Assessment  Tej Purdy is a 68 y.o. male with history of history of cholangiocarcinoma (status post liver transplant in 2022),  Aortic Stenosis, Type 2 Diabetes, Hypertension presents to the ED with CHF exacerbation (EF 20-25%, moderate to severe AS). Labs show excellent  allograft function on prograf monotherapy. Patient reports good compliance. Follows up with Dr. Maxwell in clinic.  Hepatology consulted for IS management. Doppler evaluation of liver was satisfactory with new mild intrahepatic ductal dilation in the left hepatic lobe; trace ascites in LLQ. CA 19-9 and CEA within normal limits. Decrease Prograf from 1 mg BID to 0.5 mg BID on 12/24.       Problem List:  Cholangiocarcinoma (status post liver transplant in 01/2022)  On chronic immunosuppression      Plan  - Continue Prograf 0.5 mg BID.   - please obtain daily CBC, CMP, AM Prograf level  - Plan of care was discussed with primary team      Thank you for involving us in the care of Tej Purdy. Please call with any additional concerns or questions.      Radames Lam MD, PGY-V  Gastroenterology Fellow  [facility]

## 2023-12-25 NOTE — HOSPITAL COURSE
Diuresed with IV Lasix. Continued dyspnea on exertion. PCI to Lcx on 12/26.   Glycopyrrolate Counseling:  I discussed with the patient the risks of glycopyrrolate including but not limited to skin rash, drowsiness, dry mouth, difficulty urinating, and blurred vision.

## 2023-12-25 NOTE — HPI
Consult for new HFrEF.    68M pmhx CASTILLO Cirrhosis s/p liver transplant (1/2022), moderate AS, type 2 DM, HTN admitted for new HFrEF with exacerbation. NYHA IV symptoms with dyspnea at rest, SAMSON, orthopnea that has become unbearable over the past 3 days. New diagnosis of HFrEF and has not been taking his outpatient diuretics regularly (cirrhosis). Evaluated by General Cardiology, no concern for ACS, but will need ischemic evaluation for his newly reduced EF 25% from normal. Patient had no rise in troponin, , EKG stable, hemodynamically and electrically stable. No prior stress test. Currently on eliquis, aspirin, statin, beta blocker.       - Will evaluate with PCI/LHC. Patient is a CLARENCE candidate  - Hgb 8.6, plt 128, cr 1.2   - Anti-platelet Therapy: aspirin, plavix  - Access: R Radial  - Allergies: No shellfish / Iodine contrast allergy  - Pre-Hydration: NS  - Pre-Op Med: Bendaryl 50mg pO   - All patient's questions were answered.  -The risks, benefits and alternatives of the procedure were explained to the patient.   -The risks of coronary angiography include but are not limited to: bleeding, infection, heart rhythm abnormalities, allergic reactions, kidney injury and potential need for dialysis, stroke and death.   - Should stenting be indicated, the patient has agreed to dual anti-platelet therapy for 1-consecutive year with a drug-eluting stent and a minimum of 1-month with the use of a bare metal stent  - Additionally, pt is aware that non-compliance is likely to result in stent clotting with heart attack, heart failure, and/or death  -The risks of moderate sedation include hypotension, respiratory depression, arrhythmias, bronchospasm, and death.   - Informed consent was obtained and the  patient is agreeable to proceed with the procedure.

## 2023-12-25 NOTE — PROGRESS NOTES
[facility] - Intensive Care ([address] Medicine  Progress Note    Patient Name: Tej Purdy  MRN: [id number]  Patient Class: IP- Inpatient   Admission Date: 12/22/2023  Length of Stay: 2 days  Attending Physician: Sherri Vanessa MD  Primary Care Provider: Thais Maxwell MD        Subjective:     Principal Problem:Acute diastolic (congestive) heart failure        HPI:  68-year-old WM, with history CASTILLO cirrhosis s/p Liver Transplant, Aortic Stenosis, Type 2 Diabetes, Hypertension presents to the ED with complaints of fatigue and dyspnea.   He reports that he has been having progressive dyspnea over the last 3 days.  Currently dyspnea present at rest, with minimal exertion - reporting walking 2-3 feet would cause dyspnea, he has orthopnea as well.  States he can't sleep on his side at home and has been getting up and sitting in his recliner.  Having difficulty sleeping and resulting fatigue from insomnia.      He is not taking diuretics as outpatient regularly, his wife assists in organizing/managing his medications, she is not present at bedside tonight.   He denies any chest pain, has had some chest tightness this evening.   He has received 40mg IV lasix in the ED and during my interview had total 900cc UOP in urinal (approx 23:00)     He is a former tobacco smoker, reports marijuana use(smoked) recently, no alcohol use       Overview/Hospital Course:  Echo showed severe reduced EF of 20-25%, moderate to severe low gradient aortic stenosis.  Cardiology was consulted.  As patient has Q-waves on the EKG and significant reduction in EF, there is a concern that patient had an MI prior to presentation.  Plan for angiography.  Interventional Cardiology consulted.  Started patient on aspirin, statin and a beta blocker.    Interval History:  No active complaints.  No overnight events.  Discussed echo findings with the patient and planned for angiography.  Review of Systems   Constitutional:   Positive for activity change. Negative for appetite change, chills and fever.   Respiratory:  Negative for wheezing.    Cardiovascular:  Negative for chest pain and leg swelling.   Gastrointestinal:  Negative for abdominal pain, nausea and vomiting.   Genitourinary: Negative.    Musculoskeletal: Negative.    Skin: Negative.    Neurological:  Positive for weakness.     Objective:     Vital Signs (Most Recent):  Temp: 98 °F (36.7 °C) (12/25/23 1143)  Pulse: 103 (12/25/23 1143)  Resp: 18 (12/25/23 1143)  BP: 108/67 (12/25/23 1143)  SpO2: (!) 92 % (12/25/23 1143) Vital Signs (24h Range):  Temp:  [97.6 °F (36.4 °C)-98.8 °F (37.1 °C)] 98 °F (36.7 °C)  Pulse:  [] 103  Resp:  [16-18] 18  SpO2:  [92 %-96 %] 92 %  BP: (100-124)/(57-75) 108/67     Weight: 95.4 kg (210 lb 5.1 oz)  Body mass index is 28.52 kg/m².     Physical Exam  Vitals and nursing note reviewed.   Constitutional:       General: He is not in acute distress.  HENT:      Head: Normocephalic and atraumatic.   Eyes:      General: No scleral icterus.     Pupils: Pupils are equal, round, and reactive to light.   Cardiovascular:      Rate and Rhythm: Normal rate.      Pulses: Normal pulses.      Heart sounds: Murmur (systolic) heard.   Pulmonary:      Effort: Pulmonary effort is normal. No respiratory distress.      Breath sounds: Rales present. No wheezing.   Abdominal:      General: Bowel sounds are normal. There is no distension.      Palpations: Abdomen is soft.      Tenderness: There is no abdominal tenderness. There is no guarding.   Musculoskeletal:      Cervical back: Neck supple.      Right lower leg: No edema.      Left lower leg: No edema.   Skin:     General: Skin is warm and dry.   Neurological:      General: No focal deficit present.      Mental Status: He is alert.              CRANIAL NERVES     CN III, IV, VI   Pupils are equal, round, and reactive to light.       Significant Labs: All pertinent labs within the past 24 hours have been  "reviewed.  CMP:   Recent Labs   Lab 12/24/23  0502 12/25/23  0342    137   K 4.1 4.0    101   CO2 27 25   * 154*   BUN 21 22   CREATININE 1.2 1.2   CALCIUM 8.6* 8.6*   PROT  --  6.4   ALBUMIN  --  3.4*   BILITOT  --  0.7   ALKPHOS  --  69   AST  --  14   ALT  --  12   ANIONGAP 8 11       Cardiac Markers:   No results for input(s): "CKMB", "MYOGLOBIN", "BNP", "TROPISTAT" in the last 48 hours.    Coagulation: No results for input(s): "PT", "INR", "APTT" in the last 48 hours.  Urine Studies:   No results for input(s): "COLORU", "APPEARANCEUA", "PHUR", "SPECGRAV", "PROTEINUA", "GLUCUA", "KETONESU", "BILIRUBINUA", "OCCULTUA", "NITRITE", "UROBILINOGEN", "LEUKOCYTESUR", "RBCUA", "WBCUA", "BACTERIA", "SQUAMEPITHEL", "HYALINECASTS" in the last 48 hours.    Invalid input(s): "WRIGHTSUR"      Significant Imaging: I have reviewed all pertinent imaging results/findings within the past 24 hours.    XR CHEST AP PORTABLE     CLINICAL HISTORY:  Shortness of breath     TECHNIQUE:  Single frontal view of the chest was performed.     COMPARISON:  Chest radiograph 06/01/2023, CT chest 09/18/2023     FINDINGS:  Monitoring leads overlie the chest.  Patient is rotated.  Resolution is limited by body habitus with underpenetration.     Cardiomediastinal silhouette is midline and prominent similar to prior.  Scattered linear opacities throughout the lungs consistent with minimal platelike scarring versus atelectasis.  Few scattered new subtle patchy subsegmental opacities at the bilateral mid to lower lung zones.  The lungs are otherwise well expanded without consolidation, pleural effusion or pneumothorax.  No acute osseous process seen.  PA and lateral views can be obtained.     Impression:     Bilateral mid to lower lung zone new patchy subtle opacities which may reflect subsegmental atelectasis versus multifocal airspace process including aspiration or pneumonia in the proper clinical setting.  Consider short-term " follow-up chest radiography after therapy to ensure resolution.        Electronically signed by: Stan Braun MD  Date:                                            12/22/2023  Time:                                           19:39    Assessment/Plan:      Unstable angina  Echo showed severe reduced EF of 20-25%, moderate to severe low gradient aortic stenosis.  Cardiology was consulted.  As patient has Q-waves on the EKG and significant reduction in EF, there is a concern that patient had an MI prior to presentation.  Plan for angiography.  Interventional Cardiology consulted.  Started patient on aspirin, statin and a beta blocker.      HFrEF (heart failure with reduced ejection fraction)    -patient with orthopnea, dyspnea at rest and exertion  -hx of preserved EF  -patient with hx of aortic stenosis, Repeat ECHO showed severe reduced EF of 20-25%, moderate to severe low gradient aortic stenosis.  -s/p 40mg IV lasix in ED and diuresing - on 60mg IV lasix daily   -cardiology consulted appreciate recommendation, concern for recent MI    Nonrheumatic aortic valve stenosis  Echo showed severe reduced EF of 20-25%, moderate to severe low gradient aortic stenosis.      Anemia due to unknown mechanism  Patient's anemia is currently uncontrolled. Has not received any PRBCs to date. Etiology likely d/t Iron deficiency  Current CBC reviewed-   Lab Results   Component Value Date    HGB 8.6 (L) 12/25/2023    HCT 30.9 (L) 12/25/2023     Monitor serial CBC and transfuse if patient becomes hemodynamically unstable, symptomatic or H/H drops below 7/21.    S/P liver transplant  -continue tacrolimus  -hepatology following    Persistent atrial fibrillation  Patient with Persistent (7 days or more) atrial fibrillation which is controlled currently with Amiodarone. Patient is currently in atrial fibrillation.[id number] Score: 3. HASBLED Score: . Anticoagulation indicated. Anticoagulation done with apixaban .    Type 2 diabetes mellitus,  with long-term current use of insulin  Patient's FSGs are controlled on current medication regimen.  Last A1c reviewed-   Lab Results   Component Value Date    HGBA1C 6.5 (H) 12/23/2023     Most recent fingerstick glucose reviewed-   Recent Labs   Lab 12/23/23  0153 12/23/23  0835 12/23/23  1233 12/23/23  1651   POCTGLUCOSE 151* 127* 131* 167*       Current correctional scale  Low  Maintain anti-hyperglycemic dose as follows-   Antihyperglycemics (From admission, onward)      Start     Stop Route Frequency Ordered    12/23/23 0715  insulin aspart U-100 pen 4 Units         -- SubQ 3 times daily with meals 12/23/23 0020    12/23/23 0120  insulin aspart U-100 pen 0-5 Units         -- SubQ Before meals & nightly PRN 12/23/23 0021    12/23/23 0030  insulin detemir U-100 (Levemir) pen 8 Units         -- SubQ 2 times daily 12/23/23 0020          Hold Oral hypoglycemics while patient is in the hospital.        Primary hypertension  Chronic, controlled. Latest blood pressure and vitals reviewed-     Temp:  [97.6 °F (36.4 °C)-98.8 °F (37.1 °C)]   Pulse:  []   Resp:  [16-18]   BP: (100-124)/(57-75)   SpO2:  [92 %-96 %] .     Amlodipine discontinued.  Started patient on metoprolol  While in the hospital, will manage blood pressure as follows; Continue home antihypertensive regimen      VTE Risk Mitigation (From admission, onward)           Ordered     apixaban tablet 5 mg  2 times daily         12/23/23 0020     Reason for No Pharmacological VTE Prophylaxis  Once        Question:  Reasons:  Answer:  Already adequately anticoagulated on oral Anticoagulants    12/23/23 0020     IP VTE HIGH RISK PATIENT  Once         12/23/23 0020     Place sequential compression device  Until discontinued         12/23/23 0020                    Discharge Planning   FELISHA:      Code Status: Full Code   Is the patient medically ready for discharge?: No    Reason for patient still in hospital (select all that apply): Patient trending condition,  Laboratory test, Treatment, and Consult recommendations                     Sherri Vanessa MD  Department of Hospital Medicine   [facility] - Intensive Care ([place])

## 2023-12-25 NOTE — PLAN OF CARE
Pt AAO x 4, VS monitored and stable. Medications given as ordered. Blood sugars monitored. Fluid restriction maintained. Side rails up x 2, bed locked and in lowest position, urinals provided. No concerns at this time. Call light within reach.     Problem: Adult Inpatient Plan of Care  Goal: Plan of Care Review  Outcome: Ongoing, Progressing  Flowsheets (Taken 12/25/2023 0532)  Plan of Care Reviewed With: patient  Goal: Patient-Specific Goal (Individualized)  Outcome: Ongoing, Progressing  Goal: Absence of Hospital-Acquired Illness or Injury  Outcome: Ongoing, Progressing  Intervention: Prevent Skin Injury  Flowsheets (Taken 12/25/2023 0532)  Body Position: position changed independently  Intervention: Prevent and Manage VTE (Venous Thromboembolism) Risk  Flowsheets (Taken 12/25/2023 0532)  VTE Prevention/Management:   bleeding precations maintained   bleeding risk assessed  Range of Motion: active ROM (range of motion) encouraged  Intervention: Prevent Infection  Flowsheets (Taken 12/25/2023 0532)  Infection Prevention:   hand hygiene promoted   single patient room provided  Goal: Optimal Comfort and Wellbeing  Outcome: Ongoing, Progressing  Intervention: Monitor Pain and Promote Comfort  Flowsheets (Taken 12/25/2023 0532)  Pain Management Interventions: care clustered  Intervention: Provide Person-Centered Care  Flowsheets (Taken 12/25/2023 0532)  Trust Relationship/Rapport:   care explained   thoughts/feelings acknowledged  Goal: Readiness for Transition of Care  Outcome: Ongoing, Progressing     Problem: Diabetes Comorbidity  Goal: Blood Glucose Level Within Targeted Range  Outcome: Ongoing, Progressing  Intervention: Monitor and Manage Glycemia  Flowsheets (Taken 12/25/2023 0532)  Glycemic Management: blood glucose monitored     Problem: Infection  Goal: Absence of Infection Signs and Symptoms  Outcome: Ongoing, Progressing     Problem: Fall Injury Risk  Goal: Absence of Fall and Fall-Related Injury  Outcome:  Ongoing, Progressing

## 2023-12-25 NOTE — ASSESSMENT & PLAN NOTE
Chronic, controlled. Latest blood pressure and vitals reviewed-     Temp:  [97.6 °F (36.4 °C)-98.8 °F (37.1 °C)]   Pulse:  []   Resp:  [16-18]   BP: (100-124)/(57-75)   SpO2:  [92 %-96 %] .     Amlodipine discontinued.  Started patient on metoprolol  While in the hospital, will manage blood pressure as follows; Continue home antihypertensive regimen

## 2023-12-25 NOTE — SUBJECTIVE & OBJECTIVE
Interval History: Patient endorses shortness of breath when walking to the bathroom. Denies active chest pain or chest tightness. Denies dyspnea at rest.    Review of Systems   Constitutional: Negative for chills and fever.   Cardiovascular:  Positive for dyspnea on exertion and irregular heartbeat. Negative for chest pain, leg swelling, near-syncope, orthopnea, palpitations and syncope.   Respiratory:  Positive for shortness of breath. Negative for cough and wheezing.    Gastrointestinal:  Negative for nausea and vomiting.     Objective:     Vital Signs (Most Recent):  Temp: 98 °F (36.7 °C) (12/25/23 1143)  Pulse: 103 (12/25/23 1143)  Resp: 18 (12/25/23 1143)  BP: 108/67 (12/25/23 1143)  SpO2: (!) 92 % (12/25/23 1143) Vital Signs (24h Range):  Temp:  [97.6 °F (36.4 °C)-98.8 °F (37.1 °C)] 98 °F (36.7 °C)  Pulse:  [] 103  Resp:  [16-18] 18  SpO2:  [92 %-96 %] 92 %  BP: (100-124)/(57-75) 108/67     Weight: 95.4 kg (210 lb 5.1 oz)  Body mass index is 28.52 kg/m².     SpO2: (!) 92 %         Intake/Output Summary (Last 24 hours) at 12/25/2023 1204  Last data filed at 12/25/2023 0631  Gross per 24 hour   Intake 360 ml   Output 1410 ml   Net -1050 ml       Lines/Drains/Airways       Peripheral Intravenous Line  Duration                  Peripheral IV - Single Lumen 12/22/23 1829 18 G Anterior;Right Forearm 2 days                       Physical Exam  Constitutional:       General: He is not in acute distress.     Appearance: Normal appearance.   HENT:      Head: Normocephalic and atraumatic.      Nose: Nose normal. No congestion.      Mouth/Throat:      Mouth: Mucous membranes are moist.      Pharynx: Oropharynx is clear.   Eyes:      General:         Right eye: No discharge.         Left eye: No discharge.      Extraocular Movements: Extraocular movements intact.      Conjunctiva/sclera: Conjunctivae normal.   Neck:      Comments: No JVD   Cardiovascular:      Rate and Rhythm: Regular rhythm. Tachycardia present.       Heart sounds: Murmur heard.      Comments: Crescendo-decrescendo murmur heard loudest at right sternal border  Pulmonary:      Effort: Pulmonary effort is normal. No respiratory distress.      Breath sounds: Normal breath sounds. No wheezing, rhonchi or rales.   Abdominal:      General: Abdomen is flat.      Palpations: Abdomen is soft.   Musculoskeletal:         General: No swelling.      Right lower leg: No edema.      Left lower leg: No edema.   Skin:     General: Skin is warm and dry.      Coloration: Skin is not jaundiced.   Neurological:      General: No focal deficit present.      Mental Status: He is alert and oriented to person, place, and time.   Psychiatric:         Mood and Affect: Mood normal.         Behavior: Behavior normal.         Thought Content: Thought content normal.            Significant Labs: All pertinent lab results from the last 24 hours have been reviewed.    Significant Imaging: Echocardiogram: Transthoracic echo (TTE) complete (Cupid Only):   Results for orders placed or performed during the hospital encounter of 12/22/23   Echo   Result Value Ref Range    RA Width 4.01 cm    LA volume (mod) 121.15 cm3    Left Atrium Major Axis 7.04 cm    Left Atrium Minor Axis 6.36 cm    RA Major Axis 5.89 cm    LV Diastolic Volume 110.48 mL    LV Systolic Volume 37.15 mL    TR Max Chandra 2.94 m/s    MV Peak E Chandra 0.74 m/s    Ao VTI 64.65 cm    Ao peak chandra 3.24 m/s    LVOT peak VTI 13.82 cm    LVOT peak chandra 0.66 m/s    LVOT diameter 2.4 cm    IVRT 97.05 msec    AV mean gradient 28 mmHg    TAPSE 1.50 cm    RVDD 3.78 cm    LA size 5.00 cm    Ascending aorta 3.47 cm    STJ 2.23 cm    Sinus 3.23 cm    LVIDs 3.07 2.1 - 4.0 cm    Posterior Wall 1.14 (A) 0.6 - 1.1 cm    IVS 1.11 (A) 0.6 - 1.1 cm    LVIDd 4.86 3.5 - 6.0 cm    TDI LATERAL 0.10 m/s    LA WIDTH 5.17 cm    TDI SEPTAL 0.04 m/s    LV LATERAL E/E' RATIO 7.40 m/s    LV SEPTAL E/E' RATIO 18.50 m/s    FS 37 28 - 44 %    LA volume 146.84 cm3    LV mass  204.14 g    ZLVIDD -4.67     ZLVIDS -3.37     Left Ventricle Relative Wall Thickness 0.47 cm    AV valve area 0.97 cm²    AV Velocity Ratio 0.20     AV index (prosthetic) 0.21     Mean e' 0.07 m/s    LVOT area 4.5 cm2    LVOT stroke volume 62.49 cm3    AV peak gradient 42 mmHg    E/E' ratio 10.57 m/s    LV Systolic Volume Index 16.7 mL/m2    LV Diastolic Volume Index 49.77 mL/m2    LA Volume Index 66.1 mL/m2    LV Mass Index 92 g/m2    Triscuspid Valve Regurgitation Peak Gradient 35 mmHg    LA Volume Index (Mod) 54.6 mL/m2    CHRIS by Velocity Ratio 0.92 cm²    BSA 2.25 m2    Narrative      Left Ventricle: The left ventricle is normal in size. Mildly increased   wall thickness. There is concentric remodeling. There is severely reduced   systolic function with a visually estimated ejection fraction of 20 - 25%.   All walls except for the basal septum are hypokinetic. When directly   compared to images from 7/5/2022 there has been a significant change in   left ventricular systolic function.    LV Diastolic function: Unable to assess diastolic function due to E-A   fusion. Average E/e' ratio is 11.    Right Ventricle: Normal right ventricular cavity size. Wall thickness   is normal. Right ventricle wall motion  is normal. Systolic function is   normal.    Left Atrium: Left atrium is severely dilated.    Aortic Valve: There is moderate to severe low gradient aortic stenosis.   Aortic valve area by VTI is 0.97 cm². LVOT diameter is 2.4 cm. Aortic   valve peak velocity is 3.24 m/s. Mean gradient is 28 mmHg. The   dimensionless index is 0.21. There is no significant regurgitation.    Mitral Valve: There is mild to moderate regurgitation.    Tricuspid Valve: There is mild to moderate regurgitation.    Pulmonary Artery: The estimated pulmonary artery systolic pressure is   38 mm Hg.    IVC/SVC: Low central venous pressure.    Pericardium: There is a very small circumferential effusion; slightly   more fluid is located  laterally.        Consent Type: Consent 1 (Standard)

## 2023-12-25 NOTE — ASSESSMENT & PLAN NOTE
Echo showed severe reduced EF of 20-25%, moderate to severe low gradient aortic stenosis.  Cardiology was consulted.  As patient has Q-waves on the EKG and significant reduction in EF, there is a concern that patient had an MI prior to presentation.  Plan for angiography.  Interventional Cardiology consulted.  Started patient on aspirin, statin and a beta blocker.

## 2023-12-25 NOTE — SUBJECTIVE & OBJECTIVE
Past Medical History:   Diagnosis Date    Acute appendicitis 06/02/2023    Atrial fibrillation     Calculus of ureter 12/22/2023    Cholangiocarcinoma 03/16/2022    Cirrhosis 11/23/2020    Diabetes mellitus, type 2     Diabetic neuropathy 11/24/2020    Disorder of kidney and ureter     HTN (hypertension) 11/23/2020    Hyperlipidemia 11/23/2020    Kidney stones 11/23/2020    S/p lithotripsy 2020    Leukocytosis 01/15/2021    Liver mass 11/23/2020    CASTILLO (nonalcoholic steatohepatitis) 11/23/2020    Nausea and vomiting 08/21/2023    Other ascites 03/16/2022    PAD (peripheral artery disease)     Portal hypertension 11/23/2020    Skin cancer 11/23/2020       Past Surgical History:   Procedure Laterality Date    COLONOSCOPY  10/2020    ESOPHAGEAL MANOMETRY WITH MEASUREMENT OF IMPEDANCE N/A 7/17/2023    Procedure: MANOMETRY, ESOPHAGUS, WITH IMPEDANCE MEASUREMENT;  Surgeon: Klarissa Zamora MD;  Location: [facility] (4TH FLR);  Service: Gastroenterology;  Laterality: N/A;  pt diabetic  liver txp  prep insructions sent to pt via email and portal -Jm    ESOPHAGOGASTRODUODENOSCOPY  10/2020    ESOPHAGOGASTRODUODENOSCOPY N/A 7/1/2021    Procedure: EGD (ESOPHAGOGASTRODUODENOSCOPY);  Surgeon: Jovan David MD;  Location: [facility] (4TH FLR);  Service: Endoscopy;  Laterality: N/A;  HCC. Listed for liver transplant. EGD for variceal surveillance.  cardiac clearance and blood thinenr approval received, see telephone encounter 6/23/21-BB  fully vaccinated-BB  labs same day of procedure-BB    EXCISION OF HYDROCELE Right     hemorroid surgery      hemorroidectomy      KIDNEY STONE SURGERY      LAPAROSCOPIC APPENDECTOMY N/A 6/2/2023    Procedure: APPENDECTOMY, LAPAROSCOPIC;  Surgeon: Shan Ross MD;  Location: [facility] OR 2ND FLR;  Service: General;  Laterality: N/A;    LEFT HEART CATHETERIZATION N/A 1/27/2021    Procedure: Left heart cath;  Surgeon: Kris Shelton MD;  Location: [facility] CATH LAB;  Service: Cardiology;   Laterality: N/A;    liver mass removal      LIVER TRANSPLANT N/A 1/6/2022    Procedure: TRANSPLANT, LIVER;  Surgeon: Lizet Garcia MD;  Location: [facility] OR [facility] FLR;  Service: Transplant;  Laterality: N/A;    RIGHT HEART CATHETERIZATION Right 5/7/2021    Procedure: INSERTION, CATHETER, RIGHT HEART;  Surgeon: Jaden Schuster MD;  Location: [facility] CATH LAB;  Service: Cardiology;  Laterality: Right;    TREATMENT OF CARDIAC ARRHYTHMIA N/A 5/19/2021    Procedure: CARDIOVERSION;  Surgeon: Didier Tilley MD;  Location: [facility] EP LAB;  Service: Cardiology;  Laterality: N/A;  a fib, dccv/johny, mac, dm. sscu    TREATMENT OF CARDIAC ARRHYTHMIA N/A 5/31/2021    Procedure: CARDIOVERSION;  Surgeon: Daniel Arambula MD;  Location: [facility] EP LAB;  Service: Cardiology;  Laterality: N/A;  afib, DCCV, anest., DM, 3 prep    TREATMENT OF CARDIAC ARRHYTHMIA N/A 7/7/2021    Procedure: Cardioversion or Defibrillation;  Surgeon: Didier Tilley MD;  Location: [facility] EP LAB;  Service: Cardiology;  Laterality: N/A;  AF, JOHNY (cancel if complaint), DCCV, MAC, DM, 3 Prep    TREATMENT OF CARDIAC ARRHYTHMIA N/A 7/27/2021    Procedure: Cardioversion or Defibrillation;  Surgeon: Didier Tilley MD;  Location: [facility] EP LAB;  Service: Cardiology;  Laterality: N/A;  AF, JOHNY (cx if complaint), DCCV, MAC, DM, 3 Prep       Review of patient's allergies indicates:   Allergen Reactions    Bee pollens Swelling     BEE STINGS swells body       Current Facility-Administered Medications on File Prior to Encounter   Medication    sodium chloride 0.9% bolus 1,000 mL     Current Outpatient Medications on File Prior to Encounter   Medication Sig    amiodarone (PACERONE) 200 MG Tab Take 1 tablet (200 mg total) by mouth once daily.    amLODIPine (NORVASC) 10 MG tablet Take 10 mg by mouth once daily.    apixaban (ELIQUIS) 5 mg Tab Take 1 tablet (5 mg total) by mouth 2 (two) times daily.    buPROPion (WELLBUTRIN XL) 150 MG TB24 tablet Take 1 tablet (150 mg total) by mouth  "every morning.    gabapentin (NEURONTIN) 300 MG capsule Take 1 capsule (300 mg total) by mouth 3 (three) times daily as needed (neuropathy).    insulin (LANTUS SOLOSTAR U-100 INSULIN) glargine 100 units/mL SubQ pen INJECT 22 UNITS INTO THE SKIN EVERY EVENING. ADJUST DOSE UNTIL AM GLUCOSE GOAL . MAX DOSE 30 UNITS/DY    insulin lispro (HUMALOG KWIKPEN INSULIN) 100 unit/mL pen Inject 6 Units into the skin 3 (three) times daily with meals. Plus sliding scale, MDD: 48 units    magnesium oxide (MAG-OX) 400 mg (241.3 mg magnesium) tablet Take 2 tablets (800 mg total) by mouth 3 (three) times daily.    multivitamin capsule Take 1 capsule by mouth once daily.    omega-3 fatty acids/fish oil (FISH OIL-OMEGA-3 FATTY ACIDS) 300-1,000 mg capsule Take 1 capsule by mouth once daily.     ondansetron (ZOFRAN-ODT) 4 MG TbDL Dissolve 1 tablet (4 mg total) by mouth every 8 (eight) hours as needed (nausea).    pantoprazole (PROTONIX) 40 MG tablet Take 1 tablet (40 mg total) by mouth once daily.    QUEtiapine (SEROQUEL) 100 MG Tab Take 1 tablet (100 mg total) by mouth every evening.    sertraline (ZOLOFT) 25 MG tablet Take 1 tablet (25 mg total) by mouth once daily.    tacrolimus (PROGRAF) 0.5 MG Cap Take 2 capsules (1 mg total) by mouth every 12 (twelve) hours.    aspirin (ECOTRIN) 81 MG EC tablet Take 81 mg by mouth once daily.    blood sugar diagnostic Strp To check BG 2 times daily, to use with insurance preferred meter (patient has a [employer] self-monitoring glucose meter)    blood-glucose meter kit To check BG 2 times daily, to use with insurance preferred meter    calcium carbonate-vitamin D3 600 mg-20 mcg (800 unit) Tab Take 1 tablet by mouth once daily.    lancets Misc To check BG 2 times daily, to use with insurance preferred meter    pen needle, diabetic (BD ULTRA-FINE GÉNESIS PEN NEEDLE) 32 gauge x 5/32" Ndle Use to inject insulin 4 times daily     Family History       Problem Relation (Age of Onset)    Coronary artery " disease Father          Tobacco Use    Smoking status: Former     Current packs/day: 0.00     Types: Cigarettes     Quit date: 1980     Years since quittin.9    Smokeless tobacco: Former   Substance and Sexual Activity    Alcohol use: Not Currently    Drug use: Never    Sexual activity: Not on file     Review of Systems   Constitutional:  Positive for activity change. Negative for appetite change, chills and fever.   Respiratory:  Positive for shortness of breath. Negative for wheezing.    Cardiovascular:  Negative for chest pain and leg swelling.   Gastrointestinal:  Negative for abdominal pain, nausea and vomiting.   Genitourinary: Negative.    Musculoskeletal: Negative.    Skin: Negative.    Neurological:  Positive for weakness.     Objective:     Vital Signs (Most Recent):  Temp: 98.4 °F (36.9 °C) (23)  Pulse: 106 (23)  Resp: 17 (23)  BP: 124/75 (23)  SpO2: 95 % (23) Vital Signs (24h Range):  Temp:  [98 °F (36.7 °C)-98.7 °F (37.1 °C)] 98.4 °F (36.9 °C)  Pulse:  [101-111] 106  Resp:  [16-18] 17  SpO2:  [92 %-96 %] 95 %  BP: ()/(52-75) 124/75     Weight: 99.8 kg (220 lb)  Body mass index is 29.84 kg/m².     Physical Exam  Vitals and nursing note reviewed.   Constitutional:       General: He is not in acute distress.  HENT:      Head: Normocephalic and atraumatic.   Eyes:      General: No scleral icterus.     Pupils: Pupils are equal, round, and reactive to light.   Cardiovascular:      Rate and Rhythm: Normal rate.      Pulses: Normal pulses.      Heart sounds: Murmur (systolic) heard.   Pulmonary:      Effort: Pulmonary effort is normal. No respiratory distress.      Breath sounds: Rales present. No wheezing.   Abdominal:      General: Bowel sounds are normal. There is no distension.      Palpations: Abdomen is soft.      Tenderness: There is no abdominal tenderness. There is no guarding.   Musculoskeletal:      Cervical back: Neck supple.      " Right lower leg: No edema.      Left lower leg: No edema.   Skin:     General: Skin is warm and dry.   Neurological:      General: No focal deficit present.      Mental Status: He is alert.              CRANIAL NERVES     CN III, IV, VI   Pupils are equal, round, and reactive to light.       Significant Labs: All pertinent labs within the past 24 hours have been reviewed.  CMP:   Recent Labs   Lab 12/23/23  0320 12/24/23  0502    138   K 4.1 4.1    103   CO2 25 27   * 136*   BUN 18 21   CREATININE 1.2 1.2   CALCIUM 8.7 8.6*   ANIONGAP 10 8       Cardiac Markers:   No results for input(s): "CKMB", "MYOGLOBIN", "BNP", "TROPISTAT" in the last 48 hours.    Coagulation: No results for input(s): "PT", "INR", "APTT" in the last 48 hours.  Urine Studies:   No results for input(s): "COLORU", "APPEARANCEUA", "PHUR", "SPECGRAV", "PROTEINUA", "GLUCUA", "KETONESU", "BILIRUBINUA", "OCCULTUA", "NITRITE", "UROBILINOGEN", "LEUKOCYTESUR", "RBCUA", "WBCUA", "BACTERIA", "SQUAMEPITHEL", "HYALINECASTS" in the last 48 hours.    Invalid input(s): "WRIGHTSUR"      Significant Imaging: I have reviewed all pertinent imaging results/findings within the past 24 hours.    XR CHEST AP PORTABLE     CLINICAL HISTORY:  Shortness of breath     TECHNIQUE:  Single frontal view of the chest was performed.     COMPARISON:  Chest radiograph 06/01/2023, CT chest 09/18/2023     FINDINGS:  Monitoring leads overlie the chest.  Patient is rotated.  Resolution is limited by body habitus with underpenetration.     Cardiomediastinal silhouette is midline and prominent similar to prior.  Scattered linear opacities throughout the lungs consistent with minimal platelike scarring versus atelectasis.  Few scattered new subtle patchy subsegmental opacities at the bilateral mid to lower lung zones.  The lungs are otherwise well expanded without consolidation, pleural effusion or pneumothorax.  No acute osseous process seen.  PA and lateral views can " be obtained.     Impression:     Bilateral mid to lower lung zone new patchy subtle opacities which may reflect subsegmental atelectasis versus multifocal airspace process including aspiration or pneumonia in the proper clinical setting.  Consider short-term follow-up chest radiography after therapy to ensure resolution.        Electronically signed by: Stan Barun MD  Date:                                            12/22/2023  Time:                                           19:39  Review of Systems   Constitutional: Positive for activity change. Negative for appetite change, chills and fever.   Cardiovascular:  Negative for chest pain and leg swelling.   Respiratory:  Positive for shortness of breath. Negative for wheezing.    Skin: Negative.    Musculoskeletal: Negative.    Gastrointestinal:  Negative for abdominal pain, nausea and vomiting.   Genitourinary: Negative.    Neurological:  Positive for weakness.     Physical Exam  Vitals and nursing note reviewed.   Constitutional:       General: He is not in acute distress.  HENT:      Head: Normocephalic and atraumatic.   Eyes:      General: No scleral icterus.     Pupils: Pupils are equal, round, and reactive to light.   Cardiovascular:      Rate and Rhythm: Normal rate.      Pulses: Normal pulses.      Heart sounds: Murmur (systolic) heard.   Pulmonary:      Effort: Pulmonary effort is normal. No respiratory distress.      Breath sounds: Rales present. No wheezing.   Abdominal:      General: Bowel sounds are normal. There is no distension.      Palpations: Abdomen is soft.      Tenderness: There is no abdominal tenderness. There is no guarding.   Musculoskeletal:      Cervical back: Neck supple.      Right lower leg: No edema.      Left lower leg: No edema.   Skin:     General: Skin is warm and dry.   Neurological:      General: No focal deficit present.      Mental Status: He is alert.

## 2023-12-25 NOTE — ASSESSMENT & PLAN NOTE
New onset HFrEF  Patient is identified as having Combined Systolic and Diastolic heart failure that is Acute. CHF is currently uncontrolled due to >3 pillow orthopnea, Rales/crackles on pulmonary exam, and Pulmonary edema/pleural effusion on CXR. Latest ECHO performed and demonstrates- Results for orders placed during the hospital encounter of 12/22/23    Echo    Interpretation Summary    Left Ventricle: The left ventricle is normal in size. Mildly increased wall thickness. There is concentric remodeling. There is severely reduced systolic function with a visually estimated ejection fraction of 20 - 25%. All walls except for the basal septum are hypokinetic. When directly compared to images from 7/5/2022 there has been a significant change in left ventricular systolic function.    LV Diastolic function: Unable to assess diastolic function due to E-A fusion. Average E/e' ratio is 11.    Right Ventricle: Normal right ventricular cavity size. Wall thickness is normal. Right ventricle wall motion  is normal. Systolic function is normal.    Left Atrium: Left atrium is severely dilated.    Aortic Valve: There is moderate to severe low gradient aortic stenosis. Aortic valve area by VTI is 0.97 cm². LVOT diameter is 2.4 cm. Aortic valve peak velocity is 3.24 m/s. Mean gradient is 28 mmHg. The dimensionless index is 0.21. There is no significant regurgitation.    Mitral Valve: There is mild to moderate regurgitation.    Tricuspid Valve: There is mild to moderate regurgitation.    Pulmonary Artery: The estimated pulmonary artery systolic pressure is 38 mm Hg.    IVC/SVC: Low central venous pressure.    Pericardium: There is a very small circumferential effusion; slightly more fluid is located laterally.    -- Start Entresto 24-26 mg BID   -- Start Toprol 25 mg   -- continue Lasix   -- IC consult for ischemic workup in setting of recurrent SAMSON while IP with newly reduced EF.    Monitor on telemetry   Monitor strict Is&Os and  daily weights.  Place on fluid restriction of 1.5 L.  Continue to stress to patient importance of self efficacy and  on diet for CHF. Last BNP reviewed- and noted below   Recent Labs   Lab 12/22/23  1840   *   .

## 2023-12-25 NOTE — SUBJECTIVE & OBJECTIVE
Interval History:  No active complaints.  No overnight events.  Discussed echo findings with the patient and planned for angiography.  Review of Systems   Constitutional:  Positive for activity change. Negative for appetite change, chills and fever.   Respiratory:  Negative for wheezing.    Cardiovascular:  Negative for chest pain and leg swelling.   Gastrointestinal:  Negative for abdominal pain, nausea and vomiting.   Genitourinary: Negative.    Musculoskeletal: Negative.    Skin: Negative.    Neurological:  Positive for weakness.     Objective:     Vital Signs (Most Recent):  Temp: 98 °F (36.7 °C) (12/25/23 1143)  Pulse: 103 (12/25/23 1143)  Resp: 18 (12/25/23 1143)  BP: 108/67 (12/25/23 1143)  SpO2: (!) 92 % (12/25/23 1143) Vital Signs (24h Range):  Temp:  [97.6 °F (36.4 °C)-98.8 °F (37.1 °C)] 98 °F (36.7 °C)  Pulse:  [] 103  Resp:  [16-18] 18  SpO2:  [92 %-96 %] 92 %  BP: (100-124)/(57-75) 108/67     Weight: 95.4 kg (210 lb 5.1 oz)  Body mass index is 28.52 kg/m².     Physical Exam  Vitals and nursing note reviewed.   Constitutional:       General: He is not in acute distress.  HENT:      Head: Normocephalic and atraumatic.   Eyes:      General: No scleral icterus.     Pupils: Pupils are equal, round, and reactive to light.   Cardiovascular:      Rate and Rhythm: Normal rate.      Pulses: Normal pulses.      Heart sounds: Murmur (systolic) heard.   Pulmonary:      Effort: Pulmonary effort is normal. No respiratory distress.      Breath sounds: Rales present. No wheezing.   Abdominal:      General: Bowel sounds are normal. There is no distension.      Palpations: Abdomen is soft.      Tenderness: There is no abdominal tenderness. There is no guarding.   Musculoskeletal:      Cervical back: Neck supple.      Right lower leg: No edema.      Left lower leg: No edema.   Skin:     General: Skin is warm and dry.   Neurological:      General: No focal deficit present.      Mental Status: He is alert.         "      CRANIAL NERVES     CN III, IV, VI   Pupils are equal, round, and reactive to light.       Significant Labs: All pertinent labs within the past 24 hours have been reviewed.  CMP:   Recent Labs   Lab 12/24/23  0502 12/25/23  0342    137   K 4.1 4.0    101   CO2 27 25   * 154*   BUN 21 22   CREATININE 1.2 1.2   CALCIUM 8.6* 8.6*   PROT  --  6.4   ALBUMIN  --  3.4*   BILITOT  --  0.7   ALKPHOS  --  69   AST  --  14   ALT  --  12   ANIONGAP 8 11       Cardiac Markers:   No results for input(s): "CKMB", "MYOGLOBIN", "BNP", "TROPISTAT" in the last 48 hours.    Coagulation: No results for input(s): "PT", "INR", "APTT" in the last 48 hours.  Urine Studies:   No results for input(s): "COLORU", "APPEARANCEUA", "PHUR", "SPECGRAV", "PROTEINUA", "GLUCUA", "KETONESU", "BILIRUBINUA", "OCCULTUA", "NITRITE", "UROBILINOGEN", "LEUKOCYTESUR", "RBCUA", "WBCUA", "BACTERIA", "SQUAMEPITHEL", "HYALINECASTS" in the last 48 hours.    Invalid input(s): "WRIGHTSUR"      Significant Imaging: I have reviewed all pertinent imaging results/findings within the past 24 hours.    XR CHEST AP PORTABLE     CLINICAL HISTORY:  Shortness of breath     TECHNIQUE:  Single frontal view of the chest was performed.     COMPARISON:  Chest radiograph 06/01/2023, CT chest 09/18/2023     FINDINGS:  Monitoring leads overlie the chest.  Patient is rotated.  Resolution is limited by body habitus with underpenetration.     Cardiomediastinal silhouette is midline and prominent similar to prior.  Scattered linear opacities throughout the lungs consistent with minimal platelike scarring versus atelectasis.  Few scattered new subtle patchy subsegmental opacities at the bilateral mid to lower lung zones.  The lungs are otherwise well expanded without consolidation, pleural effusion or pneumothorax.  No acute osseous process seen.  PA and lateral views can be obtained.     Impression:     Bilateral mid to lower lung zone new patchy subtle opacities " which may reflect subsegmental atelectasis versus multifocal airspace process including aspiration or pneumonia in the proper clinical setting.  Consider short-term follow-up chest radiography after therapy to ensure resolution.        Electronically signed by: Stan Braun MD  Date:                                            12/22/2023  Time:                                           19:39

## 2023-12-25 NOTE — ASSESSMENT & PLAN NOTE
- Maintain K > 4, Mag > 2 and Ca/iCal WNL to decrease arrhythmogenic potential  - Rate control with Toprol 25 mg   - Rhythm control with Amio 200 ( home dose)   - Anticoagulation with Eliquis    --> [id number] score 4   - Maintain on telemetry and daily morning EKGs for the next few days

## 2023-12-25 NOTE — ASSESSMENT & PLAN NOTE
EF 20-25% with decreased wall motion. Previous EF WNL in 2022. Suspect 2/2 ischemic cardiomyopathy.     Recommendations:  - Continue diuresis  - Metoprolol started today  - Likely start low-dose Entresto or RAAS inhibitor but will re-evaluate

## 2023-12-25 NOTE — PLAN OF CARE
IV lasix administered as ordered. Pat TENORIO. Tele monitor in place. Strict Is& Os. Safety maintain. Care ongoing.     Problem: Adult Inpatient Plan of Care  Goal: Plan of Care Review  Outcome: Ongoing, Progressing     Problem: Diabetes Comorbidity  Goal: Blood Glucose Level Within Targeted Range  Outcome: Ongoing, Progressing     Problem: Fall Injury Risk  Goal: Absence of Fall and Fall-Related Injury  Outcome: Ongoing, Progressing

## 2023-12-25 NOTE — PROGRESS NOTES
[facility] - Intensive Care ([place] [address])  Cardiology  Progress Note    Patient Name: Tej Purdy  MRN: [id number]  Admission Date: 12/22/2023  Hospital Length of Stay: 2 days  Code Status: Full Code   Attending Physician: Sherri Vanessa MD   Primary Care Physician: Thais Maxwell MD  Expected Discharge Date:   Principal Problem:Acute diastolic (congestive) heart failure    Subjective:     HPI:   Mr. Purdy is a 68 year old male with PMH CASTILLO cirrhosis s/p liver transplant, aortic stenosis, persistent Afib, T2DM, PAD, HLD and HTN who [place] [facility] [place] and dyspnea. Patient states that he experienced chest tightness, SOB and fatigue starting 12/17. On 12/22 he was admitted for HF exacerbation and has experienced persistent dyspnea and SOB during his stay. He has been diuresed with IV Lasix with good UOP.     Prior to admission, patient states he has not been taking his medications regularly. He is a former tobacco smoker, reports marijuana use(smoked) recently, no alcohol use.     Troponin .019 > .015 at admission.  ECHO on 12/23 with EF 20-25% and noted wall hypokinesis.   Cardiology was consulted for continued dyspnea on exertion.     Hospital Course:   Diuresed with IV Lasix. Continued dyspnea on exertion.     Interval History: Patient endorses shortness of breath when walking to the bathroom. Denies active chest pain or chest tightness. Denies dyspnea at rest.    Review of Systems   Constitutional: Negative for chills and fever.   Cardiovascular:  Positive for dyspnea on exertion and irregular heartbeat. Negative for chest pain, leg swelling, near-syncope, orthopnea, palpitations and syncope.   Respiratory:  Positive for shortness of breath. Negative for cough and wheezing.    Gastrointestinal:  Negative for nausea and vomiting.     Objective:     Vital Signs (Most Recent):  Temp: 98 °F (36.7 °C) (12/25/23 1143)  Pulse: 103 (12/25/23 1143)  Resp: 18 (12/25/23 1143)  BP: 108/67 (12/25/23  1143)  SpO2: (!) 92 % (12/25/23 1143) Vital Signs (24h Range):  Temp:  [97.6 °F (36.4 °C)-98.8 °F (37.1 °C)] 98 °F (36.7 °C)  Pulse:  [] 103  Resp:  [16-18] 18  SpO2:  [92 %-96 %] 92 %  BP: (100-124)/(57-75) 108/67     Weight: 95.4 kg (210 lb 5.1 oz)  Body mass index is 28.52 kg/m².     SpO2: (!) 92 %         Intake/Output Summary (Last 24 hours) at 12/25/2023 1204  Last data filed at 12/25/2023 0631  Gross per 24 hour   Intake 360 ml   Output 1410 ml   Net -1050 ml       Lines/Drains/Airways       Peripheral Intravenous Line  Duration                  Peripheral IV - Single Lumen 12/22/23 1829 18 G Anterior;Right Forearm 2 days                       Physical Exam  Constitutional:       General: He is not in acute distress.     Appearance: Normal appearance.   HENT:      Head: Normocephalic and atraumatic.      Nose: Nose normal. No congestion.      Mouth/Throat:      Mouth: Mucous membranes are moist.      Pharynx: Oropharynx is clear.   Eyes:      General:         Right eye: No discharge.         Left eye: No discharge.      Extraocular Movements: Extraocular movements intact.      Conjunctiva/sclera: Conjunctivae normal.   Neck:      Comments: No JVD   Cardiovascular:      Rate and Rhythm: Regular rhythm. Tachycardia present.      Heart sounds: Murmur heard.      Comments: Crescendo-decrescendo murmur heard loudest at right sternal border  Pulmonary:      Effort: Pulmonary effort is normal. No respiratory distress.      Breath sounds: Normal breath sounds. No wheezing, rhonchi or rales.   Abdominal:      General: Abdomen is flat.      Palpations: Abdomen is soft.   Musculoskeletal:         General: No swelling.      Right lower leg: No edema.      Left lower leg: No edema.   Skin:     General: Skin is warm and dry.      Coloration: Skin is not jaundiced.   Neurological:      General: No focal deficit present.      Mental Status: He is alert and oriented to person, place, and time.   Psychiatric:          Mood and Affect: Mood normal.         Behavior: Behavior normal.         Thought Content: Thought content normal.            Significant Labs: All pertinent lab results from the last 24 hours have been reviewed.    Significant Imaging: Echocardiogram: Transthoracic echo (TTE) complete (Cupid Only):   Results for orders placed or performed during the hospital encounter of 12/22/23   Echo   Result Value Ref Range    RA Width 4.01 cm    LA volume (mod) 121.15 cm3    Left Atrium Major Axis 7.04 cm    Left Atrium Minor Axis 6.36 cm    RA Major Axis 5.89 cm    LV Diastolic Volume 110.48 mL    LV Systolic Volume 37.15 mL    TR Max Chandra 2.94 m/s    MV Peak E Chandra 0.74 m/s    Ao VTI 64.65 cm    Ao peak chandra 3.24 m/s    LVOT peak VTI 13.82 cm    LVOT peak chandra 0.66 m/s    LVOT diameter 2.4 cm    IVRT 97.05 msec    AV mean gradient 28 mmHg    TAPSE 1.50 cm    RVDD 3.78 cm    LA size 5.00 cm    Ascending aorta 3.47 cm    STJ 2.23 cm    Sinus 3.23 cm    LVIDs 3.07 2.1 - 4.0 cm    Posterior Wall 1.14 (A) 0.6 - 1.1 cm    IVS 1.11 (A) 0.6 - 1.1 cm    LVIDd 4.86 3.5 - 6.0 cm    TDI LATERAL 0.10 m/s    LA WIDTH 5.17 cm    TDI SEPTAL 0.04 m/s    LV LATERAL E/E' RATIO 7.40 m/s    LV SEPTAL E/E' RATIO 18.50 m/s    FS 37 28 - 44 %    LA volume 146.84 cm3    LV mass 204.14 g    ZLVIDD -4.67     ZLVIDS -3.37     Left Ventricle Relative Wall Thickness 0.47 cm    AV valve area 0.97 cm²    AV Velocity Ratio 0.20     AV index (prosthetic) 0.21     Mean e' 0.07 m/s    LVOT area 4.5 cm2    LVOT stroke volume 62.49 cm3    AV peak gradient 42 mmHg    E/E' ratio 10.57 m/s    LV Systolic Volume Index 16.7 mL/m2    LV Diastolic Volume Index 49.77 mL/m2    LA Volume Index 66.1 mL/m2    LV Mass Index 92 g/m2    Triscuspid Valve Regurgitation Peak Gradient 35 mmHg    LA Volume Index (Mod) 54.6 mL/m2    CHRIS by Velocity Ratio 0.92 cm²    BSA 2.25 m2    Narrative      Left Ventricle: The left ventricle is normal in size. Mildly increased   wall thickness.  There is concentric remodeling. There is severely reduced   systolic function with a visually estimated ejection fraction of 20 - 25%.   All walls except for the basal septum are hypokinetic. When directly   compared to images from 7/5/2022 there has been a significant change in   left ventricular systolic function.    LV Diastolic function: Unable to assess diastolic function due to E-A   fusion. Average E/e' ratio is 11.    Right Ventricle: Normal right ventricular cavity size. Wall thickness   is normal. Right ventricle wall motion  is normal. Systolic function is   normal.    Left Atrium: Left atrium is severely dilated.    Aortic Valve: There is moderate to severe low gradient aortic stenosis.   Aortic valve area by VTI is 0.97 cm². LVOT diameter is 2.4 cm. Aortic   valve peak velocity is 3.24 m/s. Mean gradient is 28 mmHg. The   dimensionless index is 0.21. There is no significant regurgitation.    Mitral Valve: There is mild to moderate regurgitation.    Tricuspid Valve: There is mild to moderate regurgitation.    Pulmonary Artery: The estimated pulmonary artery systolic pressure is   38 mm Hg.    IVC/SVC: Low central venous pressure.    Pericardium: There is a very small circumferential effusion; slightly   more fluid is located laterally.       Assessment and Plan:       Unstable angina  Patient with chest tightness, shortness of breath, and dyspnea on exertion starting 12/17. Patient presented to hospital on 12/22 at which time his troponin was negative (<0.02). EKG with Q waves, no ST elevation or T wave inversions.    Suspect that patient had MI on 12/17 and by the time he presented to the hospital his troponin was WNL. Due to patient's continued SOB and dyspnea on exertion with adequate diuresis and euvolemia on examination, patient's symptoms likely due to cardiac ischemia.    Recommendations:  - Restart home ASA and statin  - Continue metoprolol and eliquis (patient does not need triple therapy at  this time)  - Start IMDUR for chest pain  - Cath lab on 12/26    HFrEF (heart failure with reduced ejection fraction)  EF 20-25% with decreased wall motion. Previous EF WNL in 2022. Suspect 2/2 ischemic cardiomyopathy.     Recommendations:  - Continue diuresis  - Metoprolol started today  - Likely start low-dose Entresto or RAAS inhibitor but will re-evaluate       VTE Risk Mitigation (From admission, onward)           Ordered     apixaban tablet 5 mg  2 times daily         12/23/23 0020     Reason for No Pharmacological VTE Prophylaxis  Once        Question:  Reasons:  Answer:  Already adequately anticoagulated on oral Anticoagulants    12/23/23 0020     IP VTE HIGH RISK PATIENT  Once         12/23/23 0020     Place sequential compression device  Until discontinued         12/23/23 0020                    Riya Steele MD  Cardiology  [facility] - Intensive Care ([place])

## 2023-12-25 NOTE — HPI
Mr. Purdy is a 68 year old male with PMH CASTILLO cirrhosis s/p liver transplant, aortic stenosis, persistent Afib, T2DM, PAD, HLD and HTN who presented to [facility] ED for fatigue and dyspnea. Patient states that he experienced chest tightness, SOB and fatigue starting 12/17. On 12/22 he was admitted for HF exacerbation and has experienced persistent dyspnea and SOB during his stay. He has been diuresed with IV Lasix with good UOP.     Prior to admission, patient states he has not been taking his medications regularly. He is a former tobacco smoker, reports marijuana use(smoked) recently, no alcohol use.     Troponin .019 > .015 at admission.  ECHO on 12/23 with EF 20-25% and noted wall hypokinesis.   Cardiology was consulted for continued dyspnea on exertion.

## 2023-12-25 NOTE — ASSESSMENT & PLAN NOTE
-patient with orthopnea, dyspnea at rest and exertion  -hx of preserved EF  -patient with hx of aortic stenosis, Repeat ECHO showed severe reduced EF of 20-25%, moderate to severe low gradient aortic stenosis.  -s/p 40mg IV lasix in ED and diuresing - on 60mg IV lasix daily   -cardiology consulted appreciate recommendation, concern for recent MI

## 2023-12-25 NOTE — ASSESSMENT & PLAN NOTE
Patient with chest tightness, shortness of breath, and dyspnea on exertion starting 12/17. Patient presented to hospital on 12/22 at which time his troponin was negative (<0.02). EKG with Q waves, no ST elevation or T wave inversions.    Suspect that patient had MI on 12/17 and by the time he presented to the hospital his troponin was WNL. Due to patient's continued SOB and dyspnea on exertion with adequate diuresis and euvolemia on examination, patient's symptoms likely due to cardiac ischemia.    Recommendations:  - Restart home ASA and statin  - Continue metoprolol and eliquis (patient does not need triple therapy at this time)  - Start IMDUR for chest pain  - Cath lab on 12/26

## 2023-12-26 LAB
ABO + RH BLD: NORMAL
ALBUMIN SERPL BCP-MCNC: 3.5 G/DL (ref 3.5–5.2)
ALBUMIN SERPL BCP-MCNC: 3.6 G/DL (ref 3.5–5.2)
ALP SERPL-CCNC: 68 U/L (ref 55–135)
ALP SERPL-CCNC: 68 U/L (ref 55–135)
ALT SERPL W/O P-5'-P-CCNC: 12 U/L (ref 10–44)
ALT SERPL W/O P-5'-P-CCNC: 13 U/L (ref 10–44)
ANION GAP SERPL CALC-SCNC: 8 MMOL/L (ref 8–16)
AST SERPL-CCNC: 16 U/L (ref 10–40)
AST SERPL-CCNC: 17 U/L (ref 10–40)
BASOPHILS # BLD AUTO: 0.05 K/UL (ref 0–0.2)
BASOPHILS NFR BLD: 0.7 % (ref 0–1.9)
BILIRUB DIRECT SERPL-MCNC: 0.2 MG/DL (ref 0.1–0.3)
BILIRUB SERPL-MCNC: 0.7 MG/DL (ref 0.1–1)
BILIRUB SERPL-MCNC: 1.1 MG/DL (ref 0.1–1)
BLD GP AB SCN CELLS X3 SERPL QL: NORMAL
BUN SERPL-MCNC: 23 MG/DL (ref 8–23)
CALCIUM SERPL-MCNC: 8.6 MG/DL (ref 8.7–10.5)
CHLORIDE SERPL-SCNC: 101 MMOL/L (ref 95–110)
CO2 SERPL-SCNC: 27 MMOL/L (ref 23–29)
CREAT SERPL-MCNC: 1.2 MG/DL (ref 0.5–1.4)
DIFFERENTIAL METHOD BLD: ABNORMAL
EOSINOPHIL # BLD AUTO: 0.2 K/UL (ref 0–0.5)
EOSINOPHIL NFR BLD: 2 % (ref 0–8)
ERYTHROCYTE [DISTWIDTH] IN BLOOD BY AUTOMATED COUNT: 20 % (ref 11.5–14.5)
EST. GFR  (NO RACE VARIABLE): >60 ML/MIN/1.73 M^2
GLUCOSE SERPL-MCNC: 149 MG/DL (ref 70–110)
HCT VFR BLD AUTO: 31.4 % (ref 40–54)
HGB BLD-MCNC: 8.7 G/DL (ref 14–18)
IMM GRANULOCYTES # BLD AUTO: 0.04 K/UL (ref 0–0.04)
IMM GRANULOCYTES NFR BLD AUTO: 0.5 % (ref 0–0.5)
LYMPHOCYTES # BLD AUTO: 1 K/UL (ref 1–4.8)
LYMPHOCYTES NFR BLD: 12.4 % (ref 18–48)
MCH RBC QN AUTO: 19 PG (ref 27–31)
MCHC RBC AUTO-ENTMCNC: 27.7 G/DL (ref 32–36)
MCV RBC AUTO: 69 FL (ref 82–98)
MONOCYTES # BLD AUTO: 0.9 K/UL (ref 0.3–1)
MONOCYTES NFR BLD: 11.3 % (ref 4–15)
NEUTROPHILS # BLD AUTO: 5.6 K/UL (ref 1.8–7.7)
NEUTROPHILS NFR BLD: 73.1 % (ref 38–73)
NRBC BLD-RTO: 0 /100 WBC
PLATELET # BLD AUTO: 145 K/UL (ref 150–450)
PMV BLD AUTO: ABNORMAL FL (ref 9.2–12.9)
POCT GLUCOSE: 150 MG/DL (ref 70–110)
POCT GLUCOSE: 151 MG/DL (ref 70–110)
POCT GLUCOSE: 171 MG/DL (ref 70–110)
POCT GLUCOSE: 192 MG/DL (ref 70–110)
POTASSIUM SERPL-SCNC: 3.8 MMOL/L (ref 3.5–5.1)
PROT SERPL-MCNC: 6.4 G/DL (ref 6–8.4)
PROT SERPL-MCNC: 6.6 G/DL (ref 6–8.4)
RBC # BLD AUTO: 4.57 M/UL (ref 4.6–6.2)
SODIUM SERPL-SCNC: 136 MMOL/L (ref 136–145)
SPECIMEN OUTDATE: NORMAL
TACROLIMUS BLD-MCNC: 6.6 NG/ML (ref 5–15)
WBC # BLD AUTO: 7.69 K/UL (ref 3.9–12.7)

## 2023-12-26 PROCEDURE — 25000003 PHARM REV CODE 250: Performed by: STUDENT IN AN ORGANIZED HEALTH CARE EDUCATION/TRAINING PROGRAM

## 2023-12-26 PROCEDURE — C1894 INTRO/SHEATH, NON-LASER: HCPCS | Performed by: INTERNAL MEDICINE

## 2023-12-26 PROCEDURE — 93005 ELECTROCARDIOGRAM TRACING: CPT

## 2023-12-26 PROCEDURE — 86901 BLOOD TYPING SEROLOGIC RH(D): CPT

## 2023-12-26 PROCEDURE — 36415 COLL VENOUS BLD VENIPUNCTURE: CPT | Performed by: INTERNAL MEDICINE

## 2023-12-26 PROCEDURE — C1725 CATH, TRANSLUMIN NON-LASER: HCPCS | Performed by: INTERNAL MEDICINE

## 2023-12-26 PROCEDURE — 85347 COAGULATION TIME ACTIVATED: CPT | Performed by: INTERNAL MEDICINE

## 2023-12-26 PROCEDURE — 80197 ASSAY OF TACROLIMUS: CPT | Performed by: STUDENT IN AN ORGANIZED HEALTH CARE EDUCATION/TRAINING PROGRAM

## 2023-12-26 PROCEDURE — 99153 MOD SED SAME PHYS/QHP EA: CPT | Performed by: INTERNAL MEDICINE

## 2023-12-26 PROCEDURE — 99233 SBSQ HOSP IP/OBS HIGH 50: CPT | Mod: ,,, | Performed by: INTERNAL MEDICINE

## 2023-12-26 PROCEDURE — 027034Z DILATION OF CORONARY ARTERY, ONE ARTERY WITH DRUG-ELUTING INTRALUMINAL DEVICE, PERCUTANEOUS APPROACH: ICD-10-PCS | Performed by: INTERNAL MEDICINE

## 2023-12-26 PROCEDURE — 36415 COLL VENOUS BLD VENIPUNCTURE: CPT | Mod: XB

## 2023-12-26 PROCEDURE — 25000003 PHARM REV CODE 250: Performed by: HOSPITALIST

## 2023-12-26 PROCEDURE — C1874 STENT, COATED/COV W/DEL SYS: HCPCS | Performed by: INTERNAL MEDICINE

## 2023-12-26 PROCEDURE — 63600175 PHARM REV CODE 636 W HCPCS: Performed by: STUDENT IN AN ORGANIZED HEALTH CARE EDUCATION/TRAINING PROGRAM

## 2023-12-26 PROCEDURE — 93458 L HRT ARTERY/VENTRICLE ANGIO: CPT | Mod: 26,59,, | Performed by: INTERNAL MEDICINE

## 2023-12-26 PROCEDURE — B2111ZZ FLUOROSCOPY OF MULTIPLE CORONARY ARTERIES USING LOW OSMOLAR CONTRAST: ICD-10-PCS | Performed by: INTERNAL MEDICINE

## 2023-12-26 PROCEDURE — 92928 PRQ TCAT PLMT NTRAC ST 1 LES: CPT | Mod: LC,,, | Performed by: INTERNAL MEDICINE

## 2023-12-26 PROCEDURE — 20000000 HC ICU ROOM

## 2023-12-26 PROCEDURE — C9600 PERC DRUG-EL COR STENT SING: HCPCS | Mod: LC | Performed by: INTERNAL MEDICINE

## 2023-12-26 PROCEDURE — 25500020 PHARM REV CODE 255: Performed by: INTERNAL MEDICINE

## 2023-12-26 PROCEDURE — 4A023N7 MEASUREMENT OF CARDIAC SAMPLING AND PRESSURE, LEFT HEART, PERCUTANEOUS APPROACH: ICD-10-PCS | Performed by: INTERNAL MEDICINE

## 2023-12-26 PROCEDURE — 93458 L HRT ARTERY/VENTRICLE ANGIO: CPT | Mod: 59 | Performed by: INTERNAL MEDICINE

## 2023-12-26 PROCEDURE — 63600175 PHARM REV CODE 636 W HCPCS: Performed by: INTERNAL MEDICINE

## 2023-12-26 PROCEDURE — 93010 ELECTROCARDIOGRAM REPORT: CPT | Mod: ,,, | Performed by: INTERNAL MEDICINE

## 2023-12-26 PROCEDURE — 99232 SBSQ HOSP IP/OBS MODERATE 35: CPT | Mod: GC,,, | Performed by: INTERNAL MEDICINE

## 2023-12-26 PROCEDURE — 25000003 PHARM REV CODE 250: Performed by: INTERNAL MEDICINE

## 2023-12-26 PROCEDURE — 63600175 PHARM REV CODE 636 W HCPCS: Mod: JZ | Performed by: INTERNAL MEDICINE

## 2023-12-26 PROCEDURE — 99152 MOD SED SAME PHYS/QHP 5/>YRS: CPT | Mod: ,,, | Performed by: INTERNAL MEDICINE

## 2023-12-26 PROCEDURE — 85025 COMPLETE CBC W/AUTO DIFF WBC: CPT | Performed by: STUDENT IN AN ORGANIZED HEALTH CARE EDUCATION/TRAINING PROGRAM

## 2023-12-26 PROCEDURE — C1887 CATHETER, GUIDING: HCPCS | Performed by: INTERNAL MEDICINE

## 2023-12-26 PROCEDURE — 36415 COLL VENOUS BLD VENIPUNCTURE: CPT | Mod: XB | Performed by: STUDENT IN AN ORGANIZED HEALTH CARE EDUCATION/TRAINING PROGRAM

## 2023-12-26 PROCEDURE — 80053 COMPREHEN METABOLIC PANEL: CPT | Performed by: STUDENT IN AN ORGANIZED HEALTH CARE EDUCATION/TRAINING PROGRAM

## 2023-12-26 PROCEDURE — 99152 MOD SED SAME PHYS/QHP 5/>YRS: CPT | Performed by: INTERNAL MEDICINE

## 2023-12-26 PROCEDURE — 80076 HEPATIC FUNCTION PANEL: CPT | Performed by: INTERNAL MEDICINE

## 2023-12-26 PROCEDURE — C1769 GUIDE WIRE: HCPCS | Performed by: INTERNAL MEDICINE

## 2023-12-26 DEVICE — EVEROLIMUS-ELUTING PLATINUM CHROMIUM CORONARY STENT SYSTEM
Type: IMPLANTABLE DEVICE | Site: CORONARY | Status: FUNCTIONAL
Brand: SYNERGY™ XD

## 2023-12-26 RX ORDER — NITROGLYCERIN 5 MG/ML
INJECTION, SOLUTION INTRAVENOUS
Status: DISCONTINUED | OUTPATIENT
Start: 2023-12-26 | End: 2023-12-26 | Stop reason: HOSPADM

## 2023-12-26 RX ORDER — HEPARIN SODIUM,PORCINE/D5W 25000/250
0-40 INTRAVENOUS SOLUTION INTRAVENOUS CONTINUOUS
Status: DISCONTINUED | OUTPATIENT
Start: 2023-12-26 | End: 2023-12-26

## 2023-12-26 RX ORDER — BUPROPION HYDROCHLORIDE 150 MG/1
150 TABLET ORAL EVERY MORNING
Qty: 30 TABLET | Refills: 0 | Status: SHIPPED | OUTPATIENT
Start: 2023-12-26 | End: 2024-01-19 | Stop reason: SDUPTHER

## 2023-12-26 RX ORDER — ASPIRIN 81 MG/1
81 TABLET ORAL DAILY
Status: DISCONTINUED | OUTPATIENT
Start: 2023-12-27 | End: 2024-01-02 | Stop reason: HOSPADM

## 2023-12-26 RX ORDER — HEPARIN SODIUM 1000 [USP'U]/ML
INJECTION, SOLUTION INTRAVENOUS; SUBCUTANEOUS
Status: DISCONTINUED | OUTPATIENT
Start: 2023-12-26 | End: 2023-12-26 | Stop reason: HOSPADM

## 2023-12-26 RX ORDER — GUAIFENESIN 100 MG/5ML
81 LIQUID (ML) ORAL ONCE
Status: COMPLETED | OUTPATIENT
Start: 2023-12-26 | End: 2023-12-26

## 2023-12-26 RX ORDER — SODIUM CHLORIDE 0.9 % (FLUSH) 0.9 %
3 SYRINGE (ML) INJECTION EVERY 6 HOURS PRN
Status: DISCONTINUED | OUTPATIENT
Start: 2023-12-26 | End: 2024-01-02 | Stop reason: HOSPADM

## 2023-12-26 RX ORDER — HEPARIN SOD,PORCINE/0.9 % NACL 1000/500ML
INTRAVENOUS SOLUTION INTRAVENOUS
Status: DISCONTINUED | OUTPATIENT
Start: 2023-12-26 | End: 2023-12-26 | Stop reason: HOSPADM

## 2023-12-26 RX ORDER — DIPHENHYDRAMINE HCL 25 MG
50 CAPSULE ORAL ONCE
Status: COMPLETED | OUTPATIENT
Start: 2023-12-26 | End: 2023-12-26

## 2023-12-26 RX ORDER — CLOPIDOGREL BISULFATE 75 MG/1
75 TABLET ORAL DAILY
Status: DISCONTINUED | OUTPATIENT
Start: 2023-12-27 | End: 2024-01-02 | Stop reason: HOSPADM

## 2023-12-26 RX ORDER — MIDAZOLAM HYDROCHLORIDE 1 MG/ML
INJECTION INTRAMUSCULAR; INTRAVENOUS
Status: DISCONTINUED | OUTPATIENT
Start: 2023-12-26 | End: 2023-12-26 | Stop reason: HOSPADM

## 2023-12-26 RX ORDER — LIDOCAINE HYDROCHLORIDE 20 MG/ML
INJECTION, SOLUTION EPIDURAL; INFILTRATION; INTRACAUDAL; PERINEURAL
Status: DISCONTINUED | OUTPATIENT
Start: 2023-12-26 | End: 2023-12-26 | Stop reason: HOSPADM

## 2023-12-26 RX ORDER — SODIUM CHLORIDE 9 MG/ML
INJECTION, SOLUTION INTRAVENOUS CONTINUOUS
Status: ACTIVE | OUTPATIENT
Start: 2023-12-26 | End: 2023-12-26

## 2023-12-26 RX ORDER — FENTANYL CITRATE 50 UG/ML
INJECTION, SOLUTION INTRAMUSCULAR; INTRAVENOUS
Status: DISCONTINUED | OUTPATIENT
Start: 2023-12-26 | End: 2023-12-26 | Stop reason: HOSPADM

## 2023-12-26 RX ORDER — CLOPIDOGREL 300 MG/1
600 TABLET, FILM COATED ORAL ONCE
Status: COMPLETED | OUTPATIENT
Start: 2023-12-26 | End: 2023-12-26

## 2023-12-26 RX ORDER — PHENYLEPHRINE HCL IN 0.9% NACL 1 MG/10 ML
SYRINGE (ML) INTRAVENOUS
Status: DISCONTINUED | OUTPATIENT
Start: 2023-12-26 | End: 2023-12-26 | Stop reason: HOSPADM

## 2023-12-26 RX ADMIN — FUROSEMIDE 40 MG: 10 INJECTION, SOLUTION INTRAVENOUS at 08:12

## 2023-12-26 RX ADMIN — ISOSORBIDE MONONITRATE 30 MG: 30 TABLET, EXTENDED RELEASE ORAL at 08:12

## 2023-12-26 RX ADMIN — TACROLIMUS 0.5 MG: 0.5 CAPSULE ORAL at 08:12

## 2023-12-26 RX ADMIN — Medication 1 CAPSULE: at 08:12

## 2023-12-26 RX ADMIN — DIPHENHYDRAMINE HYDROCHLORIDE 50 MG: 25 CAPSULE ORAL at 09:12

## 2023-12-26 RX ADMIN — Medication 800 MG: at 09:12

## 2023-12-26 RX ADMIN — Medication 800 MG: at 04:12

## 2023-12-26 RX ADMIN — INSULIN DETEMIR 8 UNITS: 100 INJECTION, SOLUTION SUBCUTANEOUS at 09:12

## 2023-12-26 RX ADMIN — INSULIN ASPART 4 UNITS: 100 INJECTION, SOLUTION INTRAVENOUS; SUBCUTANEOUS at 05:12

## 2023-12-26 RX ADMIN — CLOPIDOGREL BISULFATE 600 MG: 300 TABLET, FILM COATED ORAL at 10:12

## 2023-12-26 RX ADMIN — TACROLIMUS 0.5 MG: 0.5 CAPSULE ORAL at 05:12

## 2023-12-26 RX ADMIN — THERA TABS 1 TABLET: TAB at 08:12

## 2023-12-26 RX ADMIN — APIXABAN 5 MG: 5 TABLET, FILM COATED ORAL at 09:12

## 2023-12-26 RX ADMIN — INSULIN DETEMIR 8 UNITS: 100 INJECTION, SOLUTION SUBCUTANEOUS at 08:12

## 2023-12-26 RX ADMIN — ACETAMINOPHEN 650 MG: 325 TABLET ORAL at 01:12

## 2023-12-26 RX ADMIN — Medication 6 MG: at 09:12

## 2023-12-26 RX ADMIN — ATORVASTATIN CALCIUM 80 MG: 40 TABLET, FILM COATED ORAL at 08:12

## 2023-12-26 RX ADMIN — BUPROPION HYDROCHLORIDE 150 MG: 150 TABLET, FILM COATED, EXTENDED RELEASE ORAL at 08:12

## 2023-12-26 RX ADMIN — SODIUM CHLORIDE: 0.9 INJECTION, SOLUTION INTRAVENOUS at 01:12

## 2023-12-26 RX ADMIN — SERTRALINE HYDROCHLORIDE 25 MG: 25 TABLET ORAL at 08:12

## 2023-12-26 RX ADMIN — ASPIRIN 81 MG CHEWABLE TABLET 81 MG: 81 TABLET CHEWABLE at 09:12

## 2023-12-26 RX ADMIN — QUETIAPINE FUMARATE 100 MG: 25 TABLET ORAL at 09:12

## 2023-12-26 RX ADMIN — Medication 800 MG: at 08:12

## 2023-12-26 RX ADMIN — METOPROLOL SUCCINATE 25 MG: 25 TABLET, EXTENDED RELEASE ORAL at 08:12

## 2023-12-26 RX ADMIN — PANTOPRAZOLE SODIUM 40 MG: 40 TABLET, DELAYED RELEASE ORAL at 08:12

## 2023-12-26 NOTE — PLAN OF CARE
[facility] - Cath Lab  Initial Discharge Assessment       Primary Care Provider: Thais Maxwell MD    Admission Diagnosis: Shortness of breath [R06.02]  Fatigue [R53.83]  SOB (shortness of breath) [R06.02]  Hypervolemia, unspecified hypervolemia type [E87.70]  Acute diastolic (congestive) heart failure [I50.31]    Admission Date: 12/22/2023  Expected Discharge Date: 12/28/2023    Transition of Care Barriers: (P) None    Payor: [employer] MEDICARE / Plan: [email or web address] HEALTH / Product Type: Medicare Advantage /     Extended Emergency Contact Information  Primary Emergency Contact: Aniya Berkowitz  Mobile Phone: [phone]  Relation: Significant other    Discharge Plan A: (P) Home  Discharge Plan B: (P) Home with family      [facility] - [facility] [address]  [address]  [facility] [address]  Phone: [phone] Fax: [phone]    [id number] - [place], [place] - [address]  [address]  [place] [address]  Phone: [phone] Fax: [phone]    [facility]  [address] [facility] [address]  Phone: [phone] Fax: [phone]    [facility]  [address] [place] [address]  Phone: [phone] Fax: [phone]    [facility]  [address] [facility] [address]  Phone: [phone] Fax: [phone]      Initial Assessment (most recent)       Adult Discharge Assessment - 12/26/23 1148          Discharge Assessment    Assessment Type Discharge Planning Assessment (P)      Confirmed/corrected address, phone number and insurance Yes (P)      Confirmed Demographics Correct on Facesheet (P)      Source of Information patient (P)      Communicated FELISHA with patient/caregiver Date not available/Unable to determine (P)      Reason For Admission SOB (P)      People in Home significant other (P)      Facility Arrived From: home  (P)      Do you expect to return to your current living situation? Yes (P)      Do you have help at home or someone to help you manage your care at home? Yes (P)      Who are your caregiver(s) and their phone number(s)? Aniya Berkowitz s.o., [phone] (P)      Prior to hospitilization cognitive status: Unable to Assess (P)      Current cognitive status: Alert/Oriented (P)      Walking or Climbing Stairs Difficulty yes (P)      Walking or Climbing Stairs -- (P)    Pt c/o now he's having trouble navigating stairs, takes it slow, 1 step at a time.    Mobility Management Has a cane but does not use (P)      Home Accessibility stairs within home (P)      Equipment Currently Used at Home cane, straight;shower chair (P)      Readmission within 30 days? No (P)      Do you currently have service(s) that help you manage your care at home? No (P)      Do you take prescription medications? Yes (P)      Do you have prescription coverage? Yes (P)      Coverage PHN (P)      Do you have any problems affording any of your prescribed medications? No (P)      Who is going to help you get home at discharge? Aniya Berkowitz s.o., [phone] (P)      How do you get to doctors appointments? family or friend will provide (P)      Are you on dialysis? No (P)      Do you take coumadin? No (P)      Discharge Plan A Home (P)      Discharge Plan B Home with family (P)      DME Needed Upon Discharge  none (P)      Discharge Plan discussed with: Patient (P)      Name(s) and Number(s) Aniya Berkowitz s.o., [phone] (P)      Transition of Care Barriers None (P)         Physical Activity    On average, how many days per week do you engage in moderate to strenuous exercise (like a brisk walk)? 0 days (P)      On average, how many minutes do you engage in exercise at this level? 0 min (P)         Financial Resource Strain    How hard is it for you to pay for the very basics like food, housing, medical care, and heating? Not hard at all (P)          Housing Stability    In the last 12 months, was there a time when you were not able to pay the mortgage or rent on time? No (P)      In the last 12 months, how many places have you lived? 1 (P)      In the last 12 months, was there a time when you did not have a steady place to sleep or slept in a shelter (including now)? No (P)         Transportation Needs    In the past 12 months, has lack of transportation kept you from medical appointments or from getting medications? No (P)      In the past 12 months, has lack of transportation kept you from meetings, work, or from getting things needed for daily living? No (P)         Food Insecurity    Within the past 12 months, you worried that your food would run out before you got the money to buy more. Never true (P)      Within the past 12 months, the food you bought just didn't last and you didn't have money to get more. Never true (P)         Stress    Do you feel stress - tense, restless, nervous, or anxious, or unable to sleep at night because your mind is troubled all the time - these days? Rather much (P)         Social Connections    In a typical week, how many times do you talk on the phone with family, friends, or neighbors? Once a week (P)      How often do you get together with friends or relatives? Once a week (P)      How often do you attend Buddhism or Confucianism services? Never (P)      Do you belong to any clubs or organizations such as Buddhism groups, unions, fraternal or athletic groups, or school groups? No (P)      How often do you attend meetings of the clubs or organizations you belong to? Never (P)      Are you , , , , never , or living with a partner? Living with partner (P)         Alcohol Use    Q1: How often do you have a drink containing alcohol? Never (P)      Q2: How many drinks containing alcohol do you have on a typical day when you are drinking? Patient does not drink (P)      Q3: How often do you have  six or more drinks on one occasion? Never (P)         OTHER    Name(s) of People in Home Aniya Berkowitz, s.o., [phone] (P)                  CM spoke with pt in room.  He lives on second floor of apartment, normally doesn't have any trouble navigating stairs, but with current health problems has some trouble now, takes it slowly, one step at a time.  He lives with s.lisette Kwan.  Aniya will drive home and drives to MD appts.  No 30D readmission.  No HH, coumadin or HD.  He states he has tub bench and straight cane, denies further DME needs.  He is indep with ADL's.  Pharmacy [facility].     Grace Gupta, POLLYN, BS, RN, CCM

## 2023-12-26 NOTE — CONSULTS
[facility] ([place] [address])  Interventional Cardiology  Consult Note    Patient Name: Tej Purdy  MRN: [id number]  Admission Date: 12/22/2023  Hospital Length of Stay: 3 days  Code Status: Full Code   Attending Provider: Sherri Vanessa MD  Consulting Provider: Godfrey Ward MD  Primary Care Physician: Thais Maxwell MD  Principal Problem:Acute diastolic (congestive) heart failure    Patient information was obtained from patient and ER records.     Inpatient consult to Interventional Cardiology  Consult performed by: Godfrey Ward MD  Consult ordered by: Joann Santos MD  Reason for consult: reduced ef        Subjective:     Chief Complaint:  sob     HPI:  Consult for new HFrEF.    68M pmhx CASTILLO Cirrhosis s/p liver transplant (1/2022), moderate AS, type 2 DM, HTN admitted for new HFrEF with exacerbation. NYHA IV symptoms with dyspnea at rest, SAMSON, orthopnea that has become unbearable over the past 3 days. Had an episode of severe chest pain 1 week ago that occurred spontaneously and lasted half the day New diagnosis of HFrEF and has not been taking his outpatient diuretics regularly (cirrhosis). Evaluated by General Cardiology, no concern for ACS, but will need ischemic evaluation for his newly reduced EF 25% from normal. Patient had no rise in troponin, , EKG stable, hemodynamically and electrically stable. No prior stress test. Currently on eliquis, aspirin, statin, beta blocker.       Past Medical History:   Diagnosis Date    Acute appendicitis 06/02/2023    Atrial fibrillation     Calculus of ureter 12/22/2023    Cholangiocarcinoma 03/16/2022    Cirrhosis 11/23/2020    Diabetes mellitus, type 2     Diabetic neuropathy 11/24/2020    Disorder of kidney and ureter     HTN (hypertension) 11/23/2020    Hyperlipidemia 11/23/2020    Kidney stones 11/23/2020    S/p lithotripsy 2020    Leukocytosis 01/15/2021    Liver mass 11/23/2020    CASTILLO (nonalcoholic steatohepatitis) 11/23/2020     Nausea and vomiting 08/21/2023    Other ascites 03/16/2022    PAD (peripheral artery disease)     Portal hypertension 11/23/2020    Skin cancer 11/23/2020       Past Surgical History:   Procedure Laterality Date    COLONOSCOPY  10/2020    ESOPHAGEAL MANOMETRY WITH MEASUREMENT OF IMPEDANCE N/A 7/17/2023    Procedure: MANOMETRY, ESOPHAGUS, WITH IMPEDANCE MEASUREMENT;  Surgeon: Klarissa Zamora MD;  Location: [facility] (4TH FLR);  Service: Gastroenterology;  Laterality: N/A;  pt diabetic  liver txp  prep insructions sent to pt via email and portal -Jm    ESOPHAGOGASTRODUODENOSCOPY  10/2020    ESOPHAGOGASTRODUODENOSCOPY N/A 7/1/2021    Procedure: EGD (ESOPHAGOGASTRODUODENOSCOPY);  Surgeon: Jovan David MD;  Location: [facility] (4TH FLR);  Service: Endoscopy;  Laterality: N/A;  HCC. Listed for liver transplant. EGD for variceal surveillance.  cardiac clearance and blood thinenr approval received, see telephone encounter 6/23/21-BB  fully vaccinated-BB  labs same day of procedure-BB    EXCISION OF HYDROCELE Right     hemorroid surgery      hemorroidectomy      KIDNEY STONE SURGERY      LAPAROSCOPIC APPENDECTOMY N/A 6/2/2023    Procedure: APPENDECTOMY, LAPAROSCOPIC;  Surgeon: Shan Ross MD;  [facility];  Service: General;  Laterality: N/A;    LEFT HEART CATHETERIZATION N/A 1/27/2021    Procedure: Left heart cath;  Surgeon: Kris Shelton MD;  Location: [facility] CATH LAB;  Service: Cardiology;  Laterality: N/A;    liver mass removal      LIVER TRANSPLANT N/A 1/6/2022    Procedure: TRANSPLANT, LIVER;  Surgeon: Lizet Gacria MD;  [facility];  Service: Transplant;  Laterality: N/A;    RIGHT HEART CATHETERIZATION Right 5/7/2021    Procedure: INSERTION, CATHETER, RIGHT HEART;  Surgeon: Jaden Schuster MD;  Location: [facility] CATH LAB;  Service: Cardiology;  Laterality: Right;    TREATMENT OF CARDIAC ARRHYTHMIA N/A 5/19/2021    Procedure: CARDIOVERSION;  Surgeon: Didier Tilley MD;   Location: [facility] EP LAB;  Service: Cardiology;  Laterality: N/A;  a fib, dccv/johny, mac, dm. sscu    TREATMENT OF CARDIAC ARRHYTHMIA N/A 5/31/2021    Procedure: CARDIOVERSION;  Surgeon: Daniel Arambula MD;  Location: [facility] EP LAB;  Service: Cardiology;  Laterality: N/A;  afib, DCCV, anest., DM, 3 prep    TREATMENT OF CARDIAC ARRHYTHMIA N/A 7/7/2021    Procedure: Cardioversion or Defibrillation;  Surgeon: Didier Tilley MD;  Location: [facility] EP LAB;  Service: Cardiology;  Laterality: N/A;  AF, JOHNY (cancel if complaint), DCCV, MAC, DM, 3 Prep    TREATMENT OF CARDIAC ARRHYTHMIA N/A 7/27/2021    Procedure: Cardioversion or Defibrillation;  Surgeon: Didier Tilley MD;  Location: [facility] EP LAB;  Service: Cardiology;  Laterality: N/A;  AF, JOHNY (cx if complaint), DCCV, MAC, DM, 3 Prep       Review of patient's allergies indicates:   Allergen Reactions    Bee pollens Swelling     BEE STINGS swells body       PTA Medications   Medication Sig    amiodarone (PACERONE) 200 MG Tab Take 1 tablet (200 mg total) by mouth once daily.    amLODIPine (NORVASC) 10 MG tablet Take 10 mg by mouth once daily.    apixaban (ELIQUIS) 5 mg Tab Take 1 tablet (5 mg total) by mouth 2 (two) times daily.    gabapentin (NEURONTIN) 300 MG capsule Take 1 capsule (300 mg total) by mouth 3 (three) times daily as needed (neuropathy).    insulin (LANTUS SOLOSTAR U-100 INSULIN) glargine 100 units/mL SubQ pen INJECT 22 UNITS INTO THE SKIN EVERY EVENING. ADJUST DOSE UNTIL AM GLUCOSE GOAL . MAX DOSE 30 UNITS/DY    insulin lispro (HUMALOG KWIKPEN INSULIN) 100 unit/mL pen Inject 6 Units into the skin 3 (three) times daily with meals. Plus sliding scale, MDD: 48 units    magnesium oxide (MAG-OX) 400 mg (241.3 mg magnesium) tablet Take 2 tablets (800 mg total) by mouth 3 (three) times daily.    multivitamin capsule Take 1 capsule by mouth once daily.    omega-3 fatty acids/fish oil (FISH OIL-OMEGA-3 FATTY ACIDS) 300-1,000 mg capsule Take 1 capsule by mouth once  "daily.     ondansetron (ZOFRAN-ODT) 4 MG TbDL Dissolve 1 tablet (4 mg total) by mouth every 8 (eight) hours as needed (nausea).    pantoprazole (PROTONIX) 40 MG tablet Take 1 tablet (40 mg total) by mouth once daily.    QUEtiapine (SEROQUEL) 100 MG Tab Take 1 tablet (100 mg total) by mouth every evening.    sertraline (ZOLOFT) 25 MG tablet Take 1 tablet (25 mg total) by mouth once daily.    tacrolimus (PROGRAF) 0.5 MG Cap Take 2 capsules (1 mg total) by mouth every 12 (twelve) hours.    aspirin (ECOTRIN) 81 MG EC tablet Take 81 mg by mouth once daily.    blood sugar diagnostic Strp To check BG 2 times daily, to use with insurance preferred meter (patient has a [employer] self-monitoring glucose meter)    blood-glucose meter kit To check BG 2 times daily, to use with insurance preferred meter    calcium carbonate-vitamin D3 600 mg-20 mcg (800 unit) Tab Take 1 tablet by mouth once daily.    lancets Misc To check BG 2 times daily, to use with insurance preferred meter    pen needle, diabetic (BD ULTRA-FINE GÉNESIS PEN NEEDLE) 32 gauge x 5/32" Ndle Use to inject insulin 4 times daily    [DISCONTINUED] furosemide (LASIX) 20 MG tablet Take 1 tablet (20 mg total) by mouth once daily.     Family History       Problem Relation (Age of Onset)    Coronary artery disease Father          Tobacco Use    Smoking status: Former     Current packs/day: 0.00     Types: Cigarettes     Quit date: 1980     Years since quittin.9    Smokeless tobacco: Former   Substance and Sexual Activity    Alcohol use: Not Currently    Drug use: Never    Sexual activity: Not on file     Review of Systems   Constitutional: Negative for chills, fever, malaise/fatigue and night sweats.   HENT:  Negative for congestion, nosebleeds, sore throat, stridor and tinnitus.    Eyes:  Negative for blurred vision, discharge, double vision, pain, vision loss in left eye, vision loss in right eye, visual disturbance and visual halos.   Cardiovascular:  Positive " for chest pain and dyspnea on exertion. Negative for leg swelling, near-syncope, orthopnea, palpitations and syncope.   Respiratory:  Negative for cough, hemoptysis, shortness of breath, snoring, sputum production and wheezing.    Endocrine: Negative for cold intolerance, heat intolerance and polydipsia.   Hematologic/Lymphatic: Negative for adenopathy and bleeding problem. Does not bruise/bleed easily.   Skin:  Negative for color change, dry skin, flushing, poor wound healing and suspicious lesions.   Musculoskeletal:  Negative for arthritis, back pain, gout, joint pain and joint swelling.   Gastrointestinal:  Negative for bloating, abdominal pain, constipation and diarrhea.   Genitourinary:  Negative for dysuria, frequency and hematuria.   Neurological:  Negative for dizziness, focal weakness, headaches, light-headedness, loss of balance, numbness and weakness.   Psychiatric/Behavioral:  Negative for altered mental status, hallucinations and hypervigilance. The patient is not nervous/anxious.      Objective:     Vital Signs (Most Recent):  Temp: 97.9 °F (36.6 °C) (12/26/23 0807)  Pulse: 106 (12/26/23 0807)  Resp: 18 (12/26/23 0807)  BP: 108/75 (12/26/23 0807)  SpO2: (!) 92 % (12/26/23 0807) Vital Signs (24h Range):  Temp:  [97.8 °F (36.6 °C)-98.7 °F (37.1 °C)] 97.9 °F (36.6 °C)  Pulse:  [] 106  Resp:  [17-19] 18  SpO2:  [90 %-93 %] 92 %  BP: ()/(59-75) 108/75     Weight: 95.4 kg (210 lb 5.1 oz)  Body mass index is 28.52 kg/m².    SpO2: (!) 92 %       No intake or output data in the 24 hours ending 12/26/23 1017    Lines/Drains/Airways       Peripheral Intravenous Line  Duration                  Peripheral IV - Single Lumen 12/22/23 1829 18 G Anterior;Right Forearm 3 days         Peripheral IV - Single Lumen 12/26/23 1004 18 G Anterior;Left;Proximal Forearm <1 day                     Physical Exam  Constitutional:       General: He is not in acute distress.     Appearance: Normal appearance. He is  normal weight. He is not toxic-appearing.   HENT:      Head: Normocephalic and atraumatic.   Eyes:      General:         Right eye: No discharge.         Left eye: No discharge.      Extraocular Movements: Extraocular movements intact.      Conjunctiva/sclera: Conjunctivae normal.      Pupils: Pupils are equal, round, and reactive to light.   Cardiovascular:      Rate and Rhythm: Normal rate and regular rhythm.      Pulses: Normal pulses.      Heart sounds: Normal heart sounds. No murmur heard.     No friction rub.   Pulmonary:      Effort: Pulmonary effort is normal. No respiratory distress.      Breath sounds: Normal breath sounds. No wheezing or rales.   Abdominal:      General: Abdomen is flat. Bowel sounds are normal. There is no distension.      Palpations: Abdomen is soft.      Tenderness: There is no abdominal tenderness. There is no guarding.   Musculoskeletal:         General: No swelling, tenderness or deformity. Normal range of motion.      Cervical back: Normal range of motion and neck supple. No rigidity.      Right lower leg: No edema.      Left lower leg: No edema.   Skin:     General: Skin is warm and dry.      Capillary Refill: Capillary refill takes less than 2 seconds.      Coloration: Skin is not jaundiced or pale.      Findings: No bruising.   Neurological:      General: No focal deficit present.      Mental Status: He is alert and oriented to person, place, and time. Mental status is at baseline.   Psychiatric:         Mood and Affect: Mood normal.         Thought Content: Thought content normal.         Judgment: Judgment normal.            Assessment and Plan:     Cardiac/Vascular  HFrEF (heart failure with reduced ejection fraction)  68M pmhx CASTILLO Cirrhosis s/p liver transplant (1/2022), moderate AS, type 2 DM, HTN admitted for new HFrEF with exacerbation.   Had spontaneous acute chest pain 1 week ago that resolved after a day. No concern for ACS at this time, but will need ischemic  evaluation for his newly reduced EF 25% from normal. Negative troponin, , EKG stable, hemodynamically and electrically stable. No prior stress test.   -Can resume eliquis at night after procedure.  -GDMT as tolerated (ACE/ARB, MRA, BB, SGLT2i) after procedure.    - Will evaluate with PCI/LHC. Patient is a CLARENCE candidate  - Hgb 8.6, plt 128, cr 1.2   - Anti-platelet Therapy: aspirin, plavix  - Access: R Radial  - Allergies: No shellfish / Iodine contrast allergy  - Pre-Hydration: NS  - Pre-Op Med: Bendaryl 50mg pO   - All patient's questions were answered.  -The risks, benefits and alternatives of the procedure were explained to the patient.   -The risks of coronary angiography include but are not limited to: bleeding, infection, heart rhythm abnormalities, allergic reactions, kidney injury and potential need for dialysis, stroke and death.   - Should stenting be indicated, the patient has agreed to dual anti-platelet therapy for 1-consecutive year with a drug-eluting stent and a minimum of 1-month with the use of a bare metal stent  - Additionally, pt is aware that non-compliance is likely to result in stent clotting with heart attack, heart failure, and/or death  -The risks of moderate sedation include hypotension, respiratory depression, arrhythmias, bronchospasm, and death.   - Informed consent was obtained and the  patient is agreeable to proceed with the procedure.          VTE Risk Mitigation (From admission, onward)           Ordered     Reason for No Pharmacological VTE Prophylaxis  Once        Question:  Reasons:  Answer:  Already adequately anticoagulated on oral Anticoagulants    12/23/23 0020     IP VTE HIGH RISK PATIENT  Once         12/23/23 0020     Place sequential compression device  Until discontinued         12/23/23 0020                    Thank you for your consult. I will follow-up with patient. Please contact us if you have any additional questions.    Godfrey Ward MD  Interventional  Cardiology   [facility] ([place]

## 2023-12-26 NOTE — ASSESSMENT & PLAN NOTE
-patient with orthopnea, dyspnea at rest and exertion  -hx of preserved EF  -patient with hx of aortic stenosis, Repeat ECHO showed severe reduced EF of 20-25%, moderate to severe low gradient aortic stenosis.  -s/p 40mg IV lasix in ED and diuresing - on 60mg IV lasix daily   -cardiology consulted appreciate recommendation, concern for recent MI.  Underwent PCI with stenting placement .  -currently on aspirin, beta blocker and statin.  Can initiate GDM T as blood pressure tolerates.

## 2023-12-26 NOTE — ASSESSMENT & PLAN NOTE
Echo showed severe reduced EF of 20-25%, moderate to severe low gradient aortic stenosis.  Cardiology was consulted.  As patient has Q-waves on the EKG and significant reduction in EF, there is a concern that patient had an MI prior to presentation.   Started patient on aspirin, statin and a beta blocker.  -cardiology consulted appreciate recommendation, concern for recent MI.  Underwent PCI with stenting placement .  -currently on aspirin, beta blocker and statin.  Can initiate GDM T as blood pressure tolerates.

## 2023-12-26 NOTE — ASSESSMENT & PLAN NOTE
EF 20-25% with decreased wall motion. Previous EF WNL in 2022. Suspect 2/2 ischemic cardiomyopathy.     Recommendations:  - GDMT titration outpatient

## 2023-12-26 NOTE — NURSING
Pt c/o right throbbing shoulder pain.  Pt states he fell about 8 weeks ago and hurt his shoulder.  Orders noted.  Dr. Ward spoke with patient at bedside and also Sig other via telephone.  Deflating vasc band as per protocol.

## 2023-12-26 NOTE — ASSESSMENT & PLAN NOTE
Chronic, controlled. Latest blood pressure and vitals reviewed-     Temp:  [97.8 °F (36.6 °C)-98.7 °F (37.1 °C)]   Pulse:  []   Resp:  [17-19]   BP: ()/(59-75)   SpO2:  [90 %-97 %] .     Amlodipine discontinued.  Started patient on metoprolol  While in the hospital, will manage blood pressure as follows; Continue home antihypertensive regimen

## 2023-12-26 NOTE — NURSING TRANSFER
Nursing Transfer Note      12/26/2023   3:16 PM    Nurse giving handoff:NICHELLE Phelps  Nurse receiving handoff:NICHELLE Souza    Reason patient is being transferred: back to inpatient bed    Transfer To: [address] [place] [address]    Transfer via stretcher    Transfer with cardiac monitoring    Transported by transport tech    Transfer Vital Signs:  Blood Pressure:105/61  Heart Rate:77  O2:97  Temperature:97.9  Respirations:18    Telemetry: yes  Order for Tele Monitor? Yes    Additional Lines: Normal saline infusing at 150 ml/hr    Medicines sent: in patient's folder    Any special needs or follow-up needed: check radial site    Patient belongings transferred with patient: Yes    Chart send with patient: Yes    Notified: spouse accompanying patient

## 2023-12-26 NOTE — NURSING
Received report from NICHELLE Carr. Patient s/p [facility], AAOx3. VSS, no c/o pain or discomfort at this time, resp even and unlabored. Vascband to right radial is CDI. No active bleeding. No hematoma noted. Normal saline infusing at 150 ml/hr. Blood glucose= 151. Post procedure protocol reviewed with patient. Understanding verbalized. Wife updated via telephone. Nurse call bell within reach.

## 2023-12-26 NOTE — PROGRESS NOTES
[facility] - Intensive Care ([place] [id number])  [facility] Medicine  Progress Note    Patient Name: Tej Purdy  MRN: [id number]  Patient Class: IP- Inpatient   Admission Date: 12/22/2023  Length of Stay: 3 days  Attending Physician: Sherri Vanessa MD  Primary Care Provider: Thais Maxwell MD        Subjective:     Principal Problem:Acute diastolic (congestive) heart failure        HPI:  68-year-old WM, with history CASTILLO cirrhosis s/p Liver Transplant, Aortic Stenosis, Type 2 Diabetes, Hypertension presents to the ED with complaints of fatigue and dyspnea.   He reports that he has been having progressive dyspnea over the last 3 days.  Currently dyspnea present at rest, with minimal exertion - reporting walking 2-3 feet would cause dyspnea, he has orthopnea as well.  States he can't sleep on his side at home and has been getting up and sitting in his recliner.  Having difficulty sleeping and resulting fatigue from insomnia.      He is not taking diuretics as outpatient regularly, his wife assists in organizing/managing his medications, she is not present at bedside tonight.   He denies any chest pain, has had some chest tightness this evening.   He has received 40mg IV lasix in the ED and during my interview had total 900cc UOP in urinal (approx 23:00)     He is a former tobacco smoker, reports marijuana use(smoked) recently, no alcohol use       Overview/Hospital Course:  Echo showed severe reduced EF of 20-25%, moderate to severe low gradient aortic stenosis.  Cardiology was consulted.  As patient has Q-waves on the EKG and significant reduction in EF, there is a concern that patient had an MI prior to presentation.  Interventional Cardiology consulted.  Started patient on aspirin, statin and a beta blocker.  Patient underwent angiography, 90% stenosis noted in the marginal vessel, stent placed.  Patient started on dual antiplatelet therapy.    Interval History:  Patient is seen post stent placement.   Patient and his wife had multiple questions regarding stent and aftercare.  All the questions and concerns were addressed.  Patient reports significant improvement in breathing.      Review of Systems   Constitutional:  Positive for activity change. Negative for appetite change, chills and fever.   Respiratory:  Negative for wheezing.    Cardiovascular:  Negative for chest pain and leg swelling.   Gastrointestinal:  Negative for abdominal pain, nausea and vomiting.   Genitourinary: Negative.    Musculoskeletal: Negative.    Skin: Negative.    Neurological:  Positive for weakness.     Objective:     Vital Signs (Most Recent):  Temp: 97.9 °F (36.6 °C) (12/26/23 1515)  Pulse: 100 (12/26/23 1515)  Resp: 18 (12/26/23 1515)  BP: 105/61 (12/26/23 1515)  SpO2: 96 % (12/26/23 1245) Vital Signs (24h Range):  Temp:  [97.8 °F (36.6 °C)-98.7 °F (37.1 °C)] 97.9 °F (36.6 °C)  Pulse:  [] 100  Resp:  [17-19] 18  SpO2:  [90 %-97 %] 96 %  BP: ()/(59-75) 105/61     Weight: 95.4 kg (210 lb 5.1 oz)  Body mass index is 28.52 kg/m².     Physical Exam  Vitals and nursing note reviewed.   Constitutional:       General: He is not in acute distress.  HENT:      Head: Normocephalic and atraumatic.   Eyes:      General: No scleral icterus.     Pupils: Pupils are equal, round, and reactive to light.   Cardiovascular:      Rate and Rhythm: Normal rate.      Pulses: Normal pulses.      Heart sounds: Murmur (systolic) heard.   Pulmonary:      Effort: Pulmonary effort is normal. No respiratory distress.      Breath sounds: Rales present. No wheezing.   Abdominal:      General: Bowel sounds are normal. There is no distension.      Palpations: Abdomen is soft.      Tenderness: There is no abdominal tenderness. There is no guarding.   Musculoskeletal:      Cervical back: Neck supple.      Right lower leg: No edema.      Left lower leg: No edema.   Skin:     General: Skin is warm and dry.   Neurological:      General: No focal deficit  "present.      Mental Status: He is alert.              CRANIAL NERVES     CN III, IV, VI   Pupils are equal, round, and reactive to light.       Significant Labs: All pertinent labs within the past 24 hours have been reviewed.  CMP:   Recent Labs   Lab 12/25/23  0342 12/26/23  0257 12/26/23  1314     --  136   K 4.0  --  3.8     --  101   CO2 25  --  27   *  --  149*   BUN 22  --  23   CREATININE 1.2  --  1.2   CALCIUM 8.6*  --  8.6*   PROT 6.4 6.4 6.6   ALBUMIN 3.4* 3.5 3.6   BILITOT 0.7 0.7 1.1*   ALKPHOS 69 68 68   AST 14 17 16   ALT 12 13 12   ANIONGAP 11  --  8       Cardiac Markers:   No results for input(s): "CKMB", "MYOGLOBIN", "BNP", "TROPISTAT" in the last 48 hours.    Coagulation: No results for input(s): "PT", "INR", "APTT" in the last 48 hours.  Urine Studies:   No results for input(s): "COLORU", "APPEARANCEUA", "PHUR", "SPECGRAV", "PROTEINUA", "GLUCUA", "KETONESU", "BILIRUBINUA", "OCCULTUA", "NITRITE", "UROBILINOGEN", "LEUKOCYTESUR", "RBCUA", "WBCUA", "BACTERIA", "SQUAMEPITHEL", "HYALINECASTS" in the last 48 hours.    Invalid input(s): "WRIGHTSUR"      Significant Imaging: I have reviewed all pertinent imaging results/findings within the past 24 hours.    XR CHEST AP PORTABLE     CLINICAL HISTORY:  Shortness of breath     TECHNIQUE:  Single frontal view of the chest was performed.     COMPARISON:  Chest radiograph 06/01/2023, CT chest 09/18/2023     FINDINGS:  Monitoring leads overlie the chest.  Patient is rotated.  Resolution is limited by body habitus with underpenetration.     Cardiomediastinal silhouette is midline and prominent similar to prior.  Scattered linear opacities throughout the lungs consistent with minimal platelike scarring versus atelectasis.  Few scattered new subtle patchy subsegmental opacities at the bilateral mid to lower lung zones.  The lungs are otherwise well expanded without consolidation, pleural effusion or pneumothorax.  No acute osseous process seen.  " PA and lateral views can be obtained.     Impression:     Bilateral mid to lower lung zone new patchy subtle opacities which may reflect subsegmental atelectasis versus multifocal airspace process including aspiration or pneumonia in the proper clinical setting.  Consider short-term follow-up chest radiography after therapy to ensure resolution.        Electronically signed by: Stan Braun MD  Date:                                            12/22/2023  Time:                                           19:39    Assessment/Plan:      Unstable angina  Echo showed severe reduced EF of 20-25%, moderate to severe low gradient aortic stenosis.  Cardiology was consulted.  As patient has Q-waves on the EKG and significant reduction in EF, there is a concern that patient had an MI prior to presentation.   Started patient on aspirin, statin and a beta blocker.  -cardiology consulted appreciate recommendation, concern for recent MI.  Underwent PCI with stenting placement .  -currently on aspirin, beta blocker and statin.  Can initiate GDM T as blood pressure tolerates.      HFrEF (heart failure with reduced ejection fraction)    -patient with orthopnea, dyspnea at rest and exertion  -hx of preserved EF  -patient with hx of aortic stenosis, Repeat ECHO showed severe reduced EF of 20-25%, moderate to severe low gradient aortic stenosis.  -s/p 40mg IV lasix in ED and diuresing - on 60mg IV lasix daily   -cardiology consulted appreciate recommendation, concern for recent MI.  Underwent PCI with stenting placement .  -currently on aspirin, beta blocker and statin.  Can initiate GDM T as blood pressure tolerates.    Nonrheumatic aortic valve stenosis  Echo showed severe reduced EF of 20-25%, moderate to severe low gradient aortic stenosis.      Anemia due to unknown mechanism  Patient's anemia is currently uncontrolled. Has not received any PRBCs to date. Etiology likely d/t Iron deficiency  Current CBC reviewed-   Lab Results    Component Value Date    HGB 8.6 (L) 12/25/2023    HCT 30.9 (L) 12/25/2023     Monitor serial CBC and transfuse if patient becomes hemodynamically unstable, symptomatic or H/H drops below 7/21.    S/P liver transplant  -continue tacrolimus  -hepatology following    Persistent atrial fibrillation  Patient with Persistent (7 days or more) atrial fibrillation which is controlled currently with Amiodarone. Patient is currently in atrial fibrillation.[id number] Score: 3. HASBLED Score: . Anticoagulation indicated. Anticoagulation done with apixaban .    Type 2 diabetes mellitus, with long-term current use of insulin  Patient's FSGs are controlled on current medication regimen.  Last A1c reviewed-   Lab Results   Component Value Date    HGBA1C 6.5 (H) 12/23/2023     Most recent fingerstick glucose reviewed-   Recent Labs   Lab 12/23/23  0153 12/23/23  0835 12/23/23  1233 12/23/23  1651   POCTGLUCOSE 151* 127* 131* 167*       Current correctional scale  Low  Maintain anti-hyperglycemic dose as follows-   Antihyperglycemics (From admission, onward)      Start     Stop Route Frequency Ordered    12/23/23 0715  insulin aspart U-100 pen 4 Units         -- SubQ 3 times daily with meals 12/23/23 0020    12/23/23 0120  insulin aspart U-100 pen 0-5 Units         -- SubQ Before meals & nightly PRN 12/23/23 0021    12/23/23 0030  insulin detemir U-100 (Levemir) pen 8 Units         -- SubQ 2 times daily 12/23/23 0020          Hold Oral hypoglycemics while patient is in the hospital.        Primary hypertension  Chronic, controlled. Latest blood pressure and vitals reviewed-     Temp:  [97.8 °F (36.6 °C)-98.7 °F (37.1 °C)]   Pulse:  []   Resp:  [17-19]   BP: ()/(59-75)   SpO2:  [90 %-97 %] .     Amlodipine discontinued.  Started patient on metoprolol  While in the hospital, will manage blood pressure as follows; Continue home antihypertensive regimen      VTE Risk Mitigation (From admission, onward)           Ordered      apixaban tablet 5 mg  2 times daily         12/26/23 1202     Reason for No Pharmacological VTE Prophylaxis  Once        Question:  Reasons:  Answer:  Already adequately anticoagulated on oral Anticoagulants    12/23/23 0020     IP VTE HIGH RISK PATIENT  Once         12/23/23 0020     Place sequential compression device  Until discontinued         12/23/23 0020                    Discharge Planning   FELISHA: 12/28/2023     Code Status: Full Code   Is the patient medically ready for discharge?: No    Reason for patient still in hospital (select all that apply): Patient trending condition, Laboratory test, and Treatment  Discharge Plan A: Home                  Sherri Vanessa MD  Department of Hospital Medicine   [facility] - Intensive Care ([place])

## 2023-12-26 NOTE — SUBJECTIVE & OBJECTIVE
Past Medical History:   Diagnosis Date    Acute appendicitis 06/02/2023    Atrial fibrillation     Calculus of ureter 12/22/2023    Cholangiocarcinoma 03/16/2022    Cirrhosis 11/23/2020    Diabetes mellitus, type 2     Diabetic neuropathy 11/24/2020    Disorder of kidney and ureter     HTN (hypertension) 11/23/2020    Hyperlipidemia 11/23/2020    Kidney stones 11/23/2020    S/p lithotripsy 2020    Leukocytosis 01/15/2021    Liver mass 11/23/2020    CASTILLO (nonalcoholic steatohepatitis) 11/23/2020    Nausea and vomiting 08/21/2023    Other ascites 03/16/2022    PAD (peripheral artery disease)     Portal hypertension 11/23/2020    Skin cancer 11/23/2020       Past Surgical History:   Procedure Laterality Date    COLONOSCOPY  10/2020    ESOPHAGEAL MANOMETRY WITH MEASUREMENT OF IMPEDANCE N/A 7/17/2023    Procedure: MANOMETRY, ESOPHAGUS, WITH IMPEDANCE MEASUREMENT;  Surgeon: Klarissa Zamora MD;  Location: [facility] (4TH FLR);  Service: Gastroenterology;  Laterality: N/A;  pt diabetic  liver txp  prep insructions sent to pt via email and portal -Jm    ESOPHAGOGASTRODUODENOSCOPY  10/2020    ESOPHAGOGASTRODUODENOSCOPY N/A 7/1/2021    Procedure: EGD (ESOPHAGOGASTRODUODENOSCOPY);  Surgeon: Jovan David MD;  Location: [facility] (4TH FLR);  Service: Endoscopy;  Laterality: N/A;  HCC. Listed for liver transplant. EGD for variceal surveillance.  cardiac clearance and blood thinenr approval received, see telephone encounter 6/23/21-BB  fully vaccinated-BB  labs same day of procedure-BB    EXCISION OF HYDROCELE Right     hemorroid surgery      hemorroidectomy      KIDNEY STONE SURGERY      LAPAROSCOPIC APPENDECTOMY N/A 6/2/2023    Procedure: APPENDECTOMY, LAPAROSCOPIC;  Surgeon: Shan Ross MD;  Location: [facility] OR 2ND FLR;  Service: General;  Laterality: N/A;    LEFT HEART CATHETERIZATION N/A 1/27/2021    Procedure: Left heart cath;  Surgeon: Kris Shelton MD;  Location: [facility] CATH LAB;  Service: Cardiology;   Laterality: N/A;    liver mass removal      LIVER TRANSPLANT N/A 1/6/2022    Procedure: TRANSPLANT, LIVER;  Surgeon: Lizet Garcia MD;  Location: [facility] OR [facility] FLR;  Service: Transplant;  Laterality: N/A;    RIGHT HEART CATHETERIZATION Right 5/7/2021    Procedure: INSERTION, CATHETER, RIGHT HEART;  Surgeon: Jaden Schuster MD;  Location: [facility] CATH LAB;  Service: Cardiology;  Laterality: Right;    TREATMENT OF CARDIAC ARRHYTHMIA N/A 5/19/2021    Procedure: CARDIOVERSION;  Surgeon: Didier Tilley MD;  Location: [facility] EP LAB;  Service: Cardiology;  Laterality: N/A;  a fib, dccv/johny, mac, dm. sscu    TREATMENT OF CARDIAC ARRHYTHMIA N/A 5/31/2021    Procedure: CARDIOVERSION;  Surgeon: Daniel Arambula MD;  Location: [facility] EP LAB;  Service: Cardiology;  Laterality: N/A;  afib, DCCV, anest., DM, 3 prep    TREATMENT OF CARDIAC ARRHYTHMIA N/A 7/7/2021    Procedure: Cardioversion or Defibrillation;  Surgeon: Didier Tilley MD;  Location: [facility] EP LAB;  Service: Cardiology;  Laterality: N/A;  AF, JOHNY (cancel if complaint), DCCV, MAC, DM, 3 Prep    TREATMENT OF CARDIAC ARRHYTHMIA N/A 7/27/2021    Procedure: Cardioversion or Defibrillation;  Surgeon: Didier Tilley MD;  Location: [facility] EP LAB;  Service: Cardiology;  Laterality: N/A;  AF, JOHNY (cx if complaint), DCCV, MAC, DM, 3 Prep       Review of patient's allergies indicates:   Allergen Reactions    Bee pollens Swelling     BEE STINGS swells body       PTA Medications   Medication Sig    amiodarone (PACERONE) 200 MG Tab Take 1 tablet (200 mg total) by mouth once daily.    amLODIPine (NORVASC) 10 MG tablet Take 10 mg by mouth once daily.    apixaban (ELIQUIS) 5 mg Tab Take 1 tablet (5 mg total) by mouth 2 (two) times daily.    gabapentin (NEURONTIN) 300 MG capsule Take 1 capsule (300 mg total) by mouth 3 (three) times daily as needed (neuropathy).    insulin (LANTUS SOLOSTAR U-100 INSULIN) glargine 100 units/mL SubQ pen INJECT 22 UNITS INTO THE SKIN EVERY EVENING. ADJUST DOSE  "UNTIL AM GLUCOSE GOAL . MAX DOSE 30 UNITS/DY    insulin lispro (HUMALOG KWIKPEN INSULIN) 100 unit/mL pen Inject 6 Units into the skin 3 (three) times daily with meals. Plus sliding scale, MDD: 48 units    magnesium oxide (MAG-OX) 400 mg (241.3 mg magnesium) tablet Take 2 tablets (800 mg total) by mouth 3 (three) times daily.    multivitamin capsule Take 1 capsule by mouth once daily.    omega-3 fatty acids/fish oil (FISH OIL-OMEGA-3 FATTY ACIDS) 300-1,000 mg capsule Take 1 capsule by mouth once daily.     ondansetron (ZOFRAN-ODT) 4 MG TbDL Dissolve 1 tablet (4 mg total) by mouth every 8 (eight) hours as needed (nausea).    pantoprazole (PROTONIX) 40 MG tablet Take 1 tablet (40 mg total) by mouth once daily.    QUEtiapine (SEROQUEL) 100 MG Tab Take 1 tablet (100 mg total) by mouth every evening.    sertraline (ZOLOFT) 25 MG tablet Take 1 tablet (25 mg total) by mouth once daily.    tacrolimus (PROGRAF) 0.5 MG Cap Take 2 capsules (1 mg total) by mouth every 12 (twelve) hours.    aspirin (ECOTRIN) 81 MG EC tablet Take 81 mg by mouth once daily.    blood sugar diagnostic Strp To check BG 2 times daily, to use with insurance preferred meter (patient has a [employer] self-monitoring glucose meter)    blood-glucose meter kit To check BG 2 times daily, to use with insurance preferred meter    calcium carbonate-vitamin D3 600 mg-20 mcg (800 unit) Tab Take 1 tablet by mouth once daily.    lancets Misc To check BG 2 times daily, to use with insurance preferred meter    pen needle, diabetic (BD ULTRA-FINE GÉNESIS PEN NEEDLE) 32 gauge x 5/32" Ndle Use to inject insulin 4 times daily    [DISCONTINUED] furosemide (LASIX) 20 MG tablet Take 1 tablet (20 mg total) by mouth once daily.     Family History       Problem Relation (Age of Onset)    Coronary artery disease Father          Tobacco Use    Smoking status: Former     Current packs/day: 0.00     Types: Cigarettes     Quit date: 1980     Years since quittin.9    " Smokeless tobacco: Former   Substance and Sexual Activity    Alcohol use: Not Currently    Drug use: Never    Sexual activity: Not on file     Review of Systems   Constitutional: Negative for chills, fever, malaise/fatigue and night sweats.   HENT:  Negative for congestion, nosebleeds, sore throat, stridor and tinnitus.    Eyes:  Negative for blurred vision, discharge, double vision, pain, vision loss in left eye, vision loss in right eye, visual disturbance and visual halos.   Cardiovascular:  Positive for chest pain and dyspnea on exertion. Negative for leg swelling, near-syncope, orthopnea, palpitations and syncope.   Respiratory:  Negative for cough, hemoptysis, shortness of breath, snoring, sputum production and wheezing.    Endocrine: Negative for cold intolerance, heat intolerance and polydipsia.   Hematologic/Lymphatic: Negative for adenopathy and bleeding problem. Does not bruise/bleed easily.   Skin:  Negative for color change, dry skin, flushing, poor wound healing and suspicious lesions.   Musculoskeletal:  Negative for arthritis, back pain, gout, joint pain and joint swelling.   Gastrointestinal:  Negative for bloating, abdominal pain, constipation and diarrhea.   Genitourinary:  Negative for dysuria, frequency and hematuria.   Neurological:  Negative for dizziness, focal weakness, headaches, light-headedness, loss of balance, numbness and weakness.   Psychiatric/Behavioral:  Negative for altered mental status, hallucinations and hypervigilance. The patient is not nervous/anxious.      Objective:     Vital Signs (Most Recent):  Temp: 97.9 °F (36.6 °C) (12/26/23 0807)  Pulse: 106 (12/26/23 0807)  Resp: 18 (12/26/23 0807)  BP: 108/75 (12/26/23 0807)  SpO2: (!) 92 % (12/26/23 0807) Vital Signs (24h Range):  Temp:  [97.8 °F (36.6 °C)-98.7 °F (37.1 °C)] 97.9 °F (36.6 °C)  Pulse:  [] 106  Resp:  [17-19] 18  SpO2:  [90 %-93 %] 92 %  BP: ()/(59-75) 108/75     Weight: 95.4 kg (210 lb 5.1 oz)  Body  mass index is 28.52 kg/m².    SpO2: (!) 92 %       No intake or output data in the 24 hours ending 12/26/23 1017    Lines/Drains/Airways       Peripheral Intravenous Line  Duration                  Peripheral IV - Single Lumen 12/22/23 1829 18 G Anterior;Right Forearm 3 days         Peripheral IV - Single Lumen 12/26/23 1004 18 G Anterior;Left;Proximal Forearm <1 day                     Physical Exam  Constitutional:       General: He is not in acute distress.     Appearance: Normal appearance. He is normal weight. He is not toxic-appearing.   HENT:      Head: Normocephalic and atraumatic.   Eyes:      General:         Right eye: No discharge.         Left eye: No discharge.      Extraocular Movements: Extraocular movements intact.      Conjunctiva/sclera: Conjunctivae normal.      Pupils: Pupils are equal, round, and reactive to light.   Cardiovascular:      Rate and Rhythm: Normal rate and regular rhythm.      Pulses: Normal pulses.      Heart sounds: Normal heart sounds. No murmur heard.     No friction rub.   Pulmonary:      Effort: Pulmonary effort is normal. No respiratory distress.      Breath sounds: Normal breath sounds. No wheezing or rales.   Abdominal:      General: Abdomen is flat. Bowel sounds are normal. There is no distension.      Palpations: Abdomen is soft.      Tenderness: There is no abdominal tenderness. There is no guarding.   Musculoskeletal:         General: No swelling, tenderness or deformity. Normal range of motion.      Cervical back: Normal range of motion and neck supple. No rigidity.      Right lower leg: No edema.      Left lower leg: No edema.   Skin:     General: Skin is warm and dry.      Capillary Refill: Capillary refill takes less than 2 seconds.      Coloration: Skin is not jaundiced or pale.      Findings: No bruising.   Neurological:      General: No focal deficit present.      Mental Status: He is alert and oriented to person, place, and time. Mental status is at baseline.    Psychiatric:         Mood and Affect: Mood normal.         Thought Content: Thought content normal.         Judgment: Judgment normal.

## 2023-12-26 NOTE — BRIEF OP NOTE
Brief Operative Note:    : Carlos King MD     Referring Physician: Self,Aaareferral     All Operators: Surgeon(s):  Maninder Nova MD Jenkins, James S., MD Kim, Godfrey De MD     Preoperative Diagnosis: Chest pain [R07.9]     Postop Diagnosis: Chest pain [R07.9]    Treatments/Procedures: Procedure(s) (LRB):  Angiogram, Coronary, with Left Heart Cath (N/A)  Stent, Drug Eluting, Single Vessel, Coronary    Access: Right radial artery    Findings:Severe coronary artery disease is present in the Lcx    See catheterization report for full details.    Intervention: PCI of Lcx    See catheterization report for full details.    Closure device:  VASC Band        Plan:  - Post cath protocol   - IVF @ 200 cc/hr x 3 hours  - Bed rest x 2 hours   - Aspirin, plavix, eliquis for 1 month. Then dc aspirin for plavix and eliquis for 1 year.  - Optimize GDMT (ACE/ARB/ARNI, BB, MRA, SGLT2i)  - Continue high intensity statin therapy (LDL goal < 70)  - Risk factor reduction (BP <130/80 mmHg, glycemic control, etc)  - Cardiac rehab referral  - Give follow up with outpatient cardiologist, Dr. Zuñiga on discharge.    Estimated Blood loss: 20 cc    Specimens removed: No    Godfrey Ward MD  Interventional Cardiology PGY-4

## 2023-12-26 NOTE — ASSESSMENT & PLAN NOTE
Patient with chest tightness, shortness of breath, and dyspnea on exertion starting 12/17. Patient presented to hospital on 12/22 at which time his troponin was negative (<0.02). EKG with Q waves, no ST elevation or T wave inversions.    Suspect that patient had MI on 12/17 and by the time he presented to the hospital his troponin was WNL. Due to patient's continued SOB and dyspnea on exertion with adequate diuresis and euvolemia on examination, patient's symptoms likely due to cardiac ischemia.    [facility] with severe coronary artery disease in the Lcx. S/p PCI.    Recommendations:  - Per IC: ASA for 1 month. Plavix and Eliquis for 1 year.   - Continue statin. Continue BB.  - Optimize GDMT, titrate outpatient  - Cardiac rehab outpatient

## 2023-12-26 NOTE — NURSING
Vascband removed per protocol. Pt tolerated well. R wrist with gauze and tegaderm CDI. Wife at bedside. Radial site care instructions given to both patient and spouse. Instructions printed and given to patient's wife. Stent card given to patient's wife. Call light in reach.

## 2023-12-26 NOTE — ASSESSMENT & PLAN NOTE
68M pmhx CASTILLO Cirrhosis s/p liver transplant (1/2022), moderate AS, type 2 DM, HTN admitted for new HFrEF with exacerbation.   No concern for ACS, but will need ischemic evaluation for his newly reduced EF 25% from normal. Negative troponin, , EKG stable, hemodynamically and electrically stable. No prior stress test.   -Can resume eliquis at night after procedure.  -GDMT as tolerated (ACE/ARB, MRA, BB, SGLT2i) after procedure.    - Will evaluate with PCI/LHC. Patient is a [facility] candidate  - Hgb 8.6, plt 128, cr 1.2   - Anti-platelet Therapy: aspirin, plavix  - Access: R Radial  - Allergies: No shellfish / Iodine contrast allergy  - Pre-Hydration: NS  - Pre-Op Med: Bendaryl 50mg pO   - All patient's questions were answered.  -The risks, benefits and alternatives of the procedure were explained to the patient.   -The risks of coronary angiography include but are not limited to: bleeding, infection, heart rhythm abnormalities, allergic reactions, kidney injury and potential need for dialysis, stroke and death.   - Should stenting be indicated, the patient has agreed to dual anti-platelet therapy for 1-consecutive year with a drug-eluting stent and a minimum of 1-month with the use of a bare metal stent  - Additionally, pt is aware that non-compliance is likely to result in stent clotting with heart attack, heart failure, and/or death  -The risks of moderate sedation include hypotension, respiratory depression, arrhythmias, bronchospasm, and death.   - Informed consent was obtained and the  patient is agreeable to proceed with the procedure.

## 2023-12-26 NOTE — SUBJECTIVE & OBJECTIVE
Interval History: NAEON. Pt reports no chest pain or shortness of breath at rest. Reports SOB when walking to the bathroom.    Review of Systems   Constitutional: Negative for chills and fever.   Cardiovascular:  Positive for dyspnea on exertion. Negative for chest pain, leg swelling, near-syncope, orthopnea, palpitations and syncope.   Respiratory:  Positive for shortness of breath.    Musculoskeletal:  Negative for joint pain and joint swelling.   Neurological:  Negative for headaches and light-headedness.   Psychiatric/Behavioral:  Negative for altered mental status.      Objective:     Vital Signs (Most Recent):  Temp: 97.9 °F (36.6 °C) (12/26/23 1515)  Pulse: 100 (12/26/23 1515)  Resp: 18 (12/26/23 1515)  BP: 105/61 (12/26/23 1515)  SpO2: 96 % (12/26/23 1245) Vital Signs (24h Range):  Temp:  [97.8 °F (36.6 °C)-98.3 °F (36.8 °C)] 97.9 °F (36.6 °C)  Pulse:  [] 100  Resp:  [17-19] 18  SpO2:  [90 %-97 %] 96 %  BP: ()/(59-75) 105/61     Weight: 95.4 kg (210 lb 5.1 oz)  Body mass index is 28.52 kg/m².     SpO2: 96 %         Intake/Output Summary (Last 24 hours) at 12/26/2023 1652  Last data filed at 12/26/2023 1638  Gross per 24 hour   Intake --   Output 2200 ml   Net -2200 ml       Lines/Drains/Airways       Peripheral Intravenous Line  Duration                  Peripheral IV - Single Lumen 12/22/23 1829 18 G Anterior;Left Forearm 3 days         Peripheral IV - Single Lumen 12/26/23 1004 18 G Anterior;Left;Proximal Forearm <1 day                       Physical Exam  Constitutional:       General: He is not in acute distress.     Appearance: Normal appearance.   HENT:      Head: Normocephalic and atraumatic.      Nose: Nose normal. No congestion.      Mouth/Throat:      Mouth: Mucous membranes are moist.      Pharynx: Oropharynx is clear.   Eyes:      General:         Right eye: No discharge.         Left eye: No discharge.      Extraocular Movements: Extraocular movements intact.      Conjunctiva/sclera:  Conjunctivae normal.   Cardiovascular:      Rate and Rhythm: Regular rhythm. Tachycardia present.      Pulses: Normal pulses.      Heart sounds: Murmur heard.      Comments: Crescendo-decrescendo systolic ejection murmur  Pulmonary:      Effort: Pulmonary effort is normal.      Breath sounds: Normal breath sounds. No wheezing, rhonchi or rales.   Abdominal:      General: Abdomen is flat.      Palpations: Abdomen is soft.   Musculoskeletal:         General: No swelling.      Right lower leg: No edema.      Left lower leg: No edema.   Skin:     General: Skin is warm and dry.   Neurological:      General: No focal deficit present.      Mental Status: He is alert and oriented to person, place, and time. Mental status is at baseline.   Psychiatric:         Mood and Affect: Mood normal.         Behavior: Behavior normal.          Significant Labs: All pertinent lab results from the last 24 hours have been reviewed.    Significant Imaging: Cardiac Cath: PCI to LCx

## 2023-12-26 NOTE — SUBJECTIVE & OBJECTIVE
Interval History:  Patient is seen post stent placement.  Patient and his wife had multiple questions regarding stent and aftercare.  All the questions and concerns were addressed.  Patient reports significant improvement in breathing.      Review of Systems   Constitutional:  Positive for activity change. Negative for appetite change, chills and fever.   Respiratory:  Negative for wheezing.    Cardiovascular:  Negative for chest pain and leg swelling.   Gastrointestinal:  Negative for abdominal pain, nausea and vomiting.   Genitourinary: Negative.    Musculoskeletal: Negative.    Skin: Negative.    Neurological:  Positive for weakness.     Objective:     Vital Signs (Most Recent):  Temp: 97.9 °F (36.6 °C) (12/26/23 1515)  Pulse: 100 (12/26/23 1515)  Resp: 18 (12/26/23 1515)  BP: 105/61 (12/26/23 1515)  SpO2: 96 % (12/26/23 1245) Vital Signs (24h Range):  Temp:  [97.8 °F (36.6 °C)-98.7 °F (37.1 °C)] 97.9 °F (36.6 °C)  Pulse:  [] 100  Resp:  [17-19] 18  SpO2:  [90 %-97 %] 96 %  BP: ()/(59-75) 105/61     Weight: 95.4 kg (210 lb 5.1 oz)  Body mass index is 28.52 kg/m².     Physical Exam  Vitals and nursing note reviewed.   Constitutional:       General: He is not in acute distress.  HENT:      Head: Normocephalic and atraumatic.   Eyes:      General: No scleral icterus.     Pupils: Pupils are equal, round, and reactive to light.   Cardiovascular:      Rate and Rhythm: Normal rate.      Pulses: Normal pulses.      Heart sounds: Murmur (systolic) heard.   Pulmonary:      Effort: Pulmonary effort is normal. No respiratory distress.      Breath sounds: Rales present. No wheezing.   Abdominal:      General: Bowel sounds are normal. There is no distension.      Palpations: Abdomen is soft.      Tenderness: There is no abdominal tenderness. There is no guarding.   Musculoskeletal:      Cervical back: Neck supple.      Right lower leg: No edema.      Left lower leg: No edema.   Skin:     General: Skin is warm and  "dry.   Neurological:      General: No focal deficit present.      Mental Status: He is alert.              CRANIAL NERVES     CN III, IV, VI   Pupils are equal, round, and reactive to light.       Significant Labs: All pertinent labs within the past 24 hours have been reviewed.  CMP:   Recent Labs   Lab 12/25/23  0342 12/26/23  0257 12/26/23  1314     --  136   K 4.0  --  3.8     --  101   CO2 25  --  27   *  --  149*   BUN 22  --  23   CREATININE 1.2  --  1.2   CALCIUM 8.6*  --  8.6*   PROT 6.4 6.4 6.6   ALBUMIN 3.4* 3.5 3.6   BILITOT 0.7 0.7 1.1*   ALKPHOS 69 68 68   AST 14 17 16   ALT 12 13 12   ANIONGAP 11  --  8       Cardiac Markers:   No results for input(s): "CKMB", "MYOGLOBIN", "BNP", "TROPISTAT" in the last 48 hours.    Coagulation: No results for input(s): "PT", "INR", "APTT" in the last 48 hours.  Urine Studies:   No results for input(s): "COLORU", "APPEARANCEUA", "PHUR", "SPECGRAV", "PROTEINUA", "GLUCUA", "KETONESU", "BILIRUBINUA", "OCCULTUA", "NITRITE", "UROBILINOGEN", "LEUKOCYTESUR", "RBCUA", "WBCUA", "BACTERIA", "SQUAMEPITHEL", "HYALINECASTS" in the last 48 hours.    Invalid input(s): "WRIGHTSUR"      Significant Imaging: I have reviewed all pertinent imaging results/findings within the past 24 hours.    XR CHEST AP PORTABLE     CLINICAL HISTORY:  Shortness of breath     TECHNIQUE:  Single frontal view of the chest was performed.     COMPARISON:  Chest radiograph 06/01/2023, CT chest 09/18/2023     FINDINGS:  Monitoring leads overlie the chest.  Patient is rotated.  Resolution is limited by body habitus with underpenetration.     Cardiomediastinal silhouette is midline and prominent similar to prior.  Scattered linear opacities throughout the lungs consistent with minimal platelike scarring versus atelectasis.  Few scattered new subtle patchy subsegmental opacities at the bilateral mid to lower lung zones.  The lungs are otherwise well expanded without consolidation, pleural " effusion or pneumothorax.  No acute osseous process seen.  PA and lateral views can be obtained.     Impression:     Bilateral mid to lower lung zone new patchy subtle opacities which may reflect subsegmental atelectasis versus multifocal airspace process including aspiration or pneumonia in the proper clinical setting.  Consider short-term follow-up chest radiography after therapy to ensure resolution.        Electronically signed by: Stan Braun MD  Date:                                            12/22/2023  Time:                                           19:39

## 2023-12-26 NOTE — DISCHARGE INSTRUCTIONS
Discharge Instructions and Care of Your Wrist After a Cardiac Catheterization Procedure Performed via the Radial Artery    For 1 week following the procedure:  Do not subject your affected hand/arm to any forceful movements (i.e. supporting weight when rising from a chair or bed)  Avoid excessive (extension/flexion) wrist movement (i.e. supporting weight when rising from a chair or bed, push-ups, lifting garage doors, etc.)  Do not drive a car for 24 hours  The dressing on the puncture site may be removed after 24 hours and left open to air. If there is minor oozing, you may apply a Band-aid and remove after 12 hours  You may shower on the day following your procedure. Do not take a tub bath or submerge the puncture site in water for 1 week following the procedure  Do not lift anything heavier than 5 pounds with the affected hand/arm  Do not operate a lawnmower, motorcycle, chainsaw, or all-terrain vehicle   Do not engage in vigorous exercise (i.e. Tennis, Golf, Bowling) using the affected arm    If bleeding should occur following discharge:  Sit down and apply firm pressure to the puncture site with your fingers for 10 minutes   If the bleeding stops, continue to sit quietly, keeping your wrist straight for 2 hours. Notify your physician as soon as possible   If bleeding does not stop after 10 minutes or if there is a large amount of bleeding or spurting, call 911 immediately. Do not drive yourself to the hospital.     You should expect mild tingling in your hand and tenderness at the puncture site for up to 3 days.     Notify your physician if these symptoms persist or if you experience:  Change in color or temperature of the hand or arm  Redness, heat, or pus at the puncture site  Chills or fever greater than 101.0 F

## 2023-12-26 NOTE — PROGRESS NOTES
[facility] ([place] [address])  Cardiology  Progress Note    Patient Name: Tej Purdy  MRN: [id number]  Admission Date: 12/22/2023  Hospital Length of Stay: 3 days  Code Status: Full Code   Attending Physician: Sherri Vanessa MD   Primary Care Physician: Thais Maxwell MD  Expected Discharge Date: 12/27/2023  Principal Problem:Acute diastolic (congestive) heart failure    Subjective:     Hospital Course:   Diuresed with IV Lasix. Continued dyspnea on exertion. PCI to Lcx on 12/26.    Interval History: NAEON. Pt reports no chest pain or shortness of breath at rest. Reports SOB when walking to the bathroom.    Review of Systems   Constitutional: Negative for chills and fever.   Cardiovascular:  Positive for dyspnea on exertion. Negative for chest pain, leg swelling, near-syncope, orthopnea, palpitations and syncope.   Respiratory:  Positive for shortness of breath.    Musculoskeletal:  Negative for joint pain and joint swelling.   Neurological:  Negative for headaches and light-headedness.   Psychiatric/Behavioral:  Negative for altered mental status.      Objective:     Vital Signs (Most Recent):  Temp: 97.9 °F (36.6 °C) (12/26/23 1515)  Pulse: 100 (12/26/23 1515)  Resp: 18 (12/26/23 1515)  BP: 105/61 (12/26/23 1515)  SpO2: 96 % (12/26/23 1245) Vital Signs (24h Range):  Temp:  [97.8 °F (36.6 °C)-98.3 °F (36.8 °C)] 97.9 °F (36.6 °C)  Pulse:  [] 100  Resp:  [17-19] 18  SpO2:  [90 %-97 %] 96 %  BP: ()/(59-75) 105/61     Weight: 95.4 kg (210 lb 5.1 oz)  Body mass index is 28.52 kg/m².     SpO2: 96 %         Intake/Output Summary (Last 24 hours) at 12/26/2023 1652  Last data filed at 12/26/2023 1638  Gross per 24 hour   Intake --   Output 2200 ml   Net -2200 ml       Lines/Drains/Airways       Peripheral Intravenous Line  Duration                  Peripheral IV - Single Lumen 12/22/23 1829 18 G Anterior;Left Forearm 3 days         Peripheral IV - Single Lumen 12/26/23 1004 18 G  Anterior;Left;Proximal Forearm <1 day                       Physical Exam  Constitutional:       General: He is not in acute distress.     Appearance: Normal appearance.   HENT:      Head: Normocephalic and atraumatic.      Nose: Nose normal. No congestion.      Mouth/Throat:      Mouth: Mucous membranes are moist.      Pharynx: Oropharynx is clear.   Eyes:      General:         Right eye: No discharge.         Left eye: No discharge.      Extraocular Movements: Extraocular movements intact.      Conjunctiva/sclera: Conjunctivae normal.   Cardiovascular:      Rate and Rhythm: Regular rhythm. Tachycardia present.      Pulses: Normal pulses.      Heart sounds: Murmur heard.      Comments: Crescendo-decrescendo systolic ejection murmur  Pulmonary:      Effort: Pulmonary effort is normal.      Breath sounds: Normal breath sounds. No wheezing, rhonchi or rales.   Abdominal:      General: Abdomen is flat.      Palpations: Abdomen is soft.   Musculoskeletal:         General: No swelling.      Right lower leg: No edema.      Left lower leg: No edema.   Skin:     General: Skin is warm and dry.   Neurological:      General: No focal deficit present.      Mental Status: He is alert and oriented to person, place, and time. Mental status is at baseline.   Psychiatric:         Mood and Affect: Mood normal.         Behavior: Behavior normal.          Significant Labs: All pertinent lab results from the last 24 hours have been reviewed.    Significant Imaging: Cardiac Cath: PCI to LCx  Assessment and Plan:       Unstable angina  Patient with chest tightness, shortness of breath, and dyspnea on exertion starting 12/17. Patient presented to hospital on 12/22 at which time his troponin was negative (<0.02). EKG with Q waves, no ST elevation or T wave inversions.    Suspect that patient had MI on 12/17 and by the time he presented to the hospital his troponin was WNL. Due to patient's continued SOB and dyspnea on exertion with adequate  diuresis and euvolemia on examination, patient's symptoms likely due to cardiac ischemia.    [facility] with severe coronary artery disease in the Lcx. S/p PCI.    Recommendations:  - Per IC: ASA for 1 month. Plavix and Eliquis for 1 year.   - Continue statin. Continue BB.  - Optimize GDMT, titrate outpatient  - Cardiac rehab outpatient      HFrEF (heart failure with reduced ejection fraction)  EF 20-25% with decreased wall motion. Previous EF WNL in 2022. Suspect 2/2 ischemic cardiomyopathy.     Recommendations:  - GDMT titration outpatient        VTE Risk Mitigation (From admission, onward)           Ordered     apixaban tablet 5 mg  2 times daily         12/26/23 1202     Reason for No Pharmacological VTE Prophylaxis  Once        Question:  Reasons:  Answer:  Already adequately anticoagulated on oral Anticoagulants    12/23/23 0020     IP VTE HIGH RISK PATIENT  Once         12/23/23 0020     Place sequential compression device  Until discontinued         12/23/23 0020                    Riya Steele MD  Cardiology  [facility] - Intensive Care ([place]-16)

## 2023-12-27 ENCOUNTER — TELEPHONE (OUTPATIENT)
Dept: CARDIAC REHAB | Facility: CLINIC | Age: 68
End: 2023-12-27
Payer: MEDICARE

## 2023-12-27 PROBLEM — Z95.5 PRESENCE OF DRUG-ELUTING STENT IN LEFT CIRCUMFLEX CORONARY ARTERY: Status: ACTIVE | Noted: 2023-12-26

## 2023-12-27 PROBLEM — Z79.4 TYPE 2 DIABETES MELLITUS, WITH LONG-TERM CURRENT USE OF INSULIN: Chronic | Status: ACTIVE | Noted: 2021-01-19

## 2023-12-27 PROBLEM — I48.19 PERSISTENT ATRIAL FIBRILLATION: Chronic | Status: ACTIVE | Noted: 2021-05-14

## 2023-12-27 PROBLEM — R42 DIZZINESS: Status: ACTIVE | Noted: 2023-12-27

## 2023-12-27 PROBLEM — Z94.4 S/P LIVER TRANSPLANT: Chronic | Status: ACTIVE | Noted: 2022-01-06

## 2023-12-27 PROBLEM — E11.9 TYPE 2 DIABETES MELLITUS, WITH LONG-TERM CURRENT USE OF INSULIN: Chronic | Status: ACTIVE | Noted: 2021-01-19

## 2023-12-27 PROBLEM — I50.20 HFREF (HEART FAILURE WITH REDUCED EJECTION FRACTION): Chronic | Status: ACTIVE | Noted: 2023-12-24

## 2023-12-27 LAB
ALBUMIN SERPL BCP-MCNC: 3.5 G/DL (ref 3.5–5.2)
ALBUMIN SERPL BCP-MCNC: 3.5 G/DL (ref 3.5–5.2)
ALBUMIN SERPL BCP-MCNC: 3.6 G/DL (ref 3.5–5.2)
ALP SERPL-CCNC: 68 U/L (ref 55–135)
ALP SERPL-CCNC: 68 U/L (ref 55–135)
ALP SERPL-CCNC: 72 U/L (ref 55–135)
ALT SERPL W/O P-5'-P-CCNC: 13 U/L (ref 10–44)
ALT SERPL W/O P-5'-P-CCNC: 13 U/L (ref 10–44)
ALT SERPL W/O P-5'-P-CCNC: 14 U/L (ref 10–44)
ANION GAP SERPL CALC-SCNC: 8 MMOL/L (ref 8–16)
ANION GAP SERPL CALC-SCNC: 9 MMOL/L (ref 8–16)
AST SERPL-CCNC: 16 U/L (ref 10–40)
BASOPHILS # BLD AUTO: 0.06 K/UL (ref 0–0.2)
BASOPHILS NFR BLD: 0.9 % (ref 0–1.9)
BILIRUB DIRECT SERPL-MCNC: 0.3 MG/DL (ref 0.1–0.3)
BILIRUB SERPL-MCNC: 0.7 MG/DL (ref 0.1–1)
BUN SERPL-MCNC: 25 MG/DL (ref 8–23)
BUN SERPL-MCNC: 27 MG/DL (ref 8–23)
CALCIUM SERPL-MCNC: 8.7 MG/DL (ref 8.7–10.5)
CALCIUM SERPL-MCNC: 8.9 MG/DL (ref 8.7–10.5)
CHLORIDE SERPL-SCNC: 100 MMOL/L (ref 95–110)
CHLORIDE SERPL-SCNC: 101 MMOL/L (ref 95–110)
CO2 SERPL-SCNC: 28 MMOL/L (ref 23–29)
CO2 SERPL-SCNC: 30 MMOL/L (ref 23–29)
CREAT SERPL-MCNC: 1.3 MG/DL (ref 0.5–1.4)
CREAT SERPL-MCNC: 1.3 MG/DL (ref 0.5–1.4)
DIFFERENTIAL METHOD BLD: ABNORMAL
EOSINOPHIL # BLD AUTO: 0.2 K/UL (ref 0–0.5)
EOSINOPHIL NFR BLD: 2.8 % (ref 0–8)
ERYTHROCYTE [DISTWIDTH] IN BLOOD BY AUTOMATED COUNT: 19.9 % (ref 11.5–14.5)
EST. GFR  (NO RACE VARIABLE): 59.1 ML/MIN/1.73 M^2
EST. GFR  (NO RACE VARIABLE): 59.1 ML/MIN/1.73 M^2
GLUCOSE SERPL-MCNC: 138 MG/DL (ref 70–110)
GLUCOSE SERPL-MCNC: 151 MG/DL (ref 70–110)
HCT VFR BLD AUTO: 30.9 % (ref 40–54)
HGB BLD-MCNC: 8.5 G/DL (ref 14–18)
IMM GRANULOCYTES # BLD AUTO: 0.08 K/UL (ref 0–0.04)
IMM GRANULOCYTES NFR BLD AUTO: 1.3 % (ref 0–0.5)
LACTATE SERPL-SCNC: 0.9 MMOL/L (ref 0.5–2.2)
LYMPHOCYTES # BLD AUTO: 1.1 K/UL (ref 1–4.8)
LYMPHOCYTES NFR BLD: 16.8 % (ref 18–48)
MCH RBC QN AUTO: 18.9 PG (ref 27–31)
MCHC RBC AUTO-ENTMCNC: 27.5 G/DL (ref 32–36)
MCV RBC AUTO: 69 FL (ref 82–98)
MONOCYTES # BLD AUTO: 0.8 K/UL (ref 0.3–1)
MONOCYTES NFR BLD: 13 % (ref 4–15)
NEUTROPHILS # BLD AUTO: 4.1 K/UL (ref 1.8–7.7)
NEUTROPHILS NFR BLD: 65.2 % (ref 38–73)
NRBC BLD-RTO: 0 /100 WBC
PLATELET # BLD AUTO: 151 K/UL (ref 150–450)
PMV BLD AUTO: ABNORMAL FL (ref 9.2–12.9)
POC ACTIVATED CLOTTING TIME K: 304 SEC (ref 74–137)
POCT GLUCOSE: 103 MG/DL (ref 70–110)
POCT GLUCOSE: 163 MG/DL (ref 70–110)
POCT GLUCOSE: 180 MG/DL (ref 70–110)
POCT GLUCOSE: 184 MG/DL (ref 70–110)
POCT GLUCOSE: 200 MG/DL (ref 70–110)
POTASSIUM SERPL-SCNC: 4.2 MMOL/L (ref 3.5–5.1)
POTASSIUM SERPL-SCNC: 4.2 MMOL/L (ref 3.5–5.1)
PROT SERPL-MCNC: 6.6 G/DL (ref 6–8.4)
PROT SERPL-MCNC: 6.6 G/DL (ref 6–8.4)
PROT SERPL-MCNC: 6.8 G/DL (ref 6–8.4)
RBC # BLD AUTO: 4.49 M/UL (ref 4.6–6.2)
SAMPLE: ABNORMAL
SODIUM SERPL-SCNC: 137 MMOL/L (ref 136–145)
SODIUM SERPL-SCNC: 139 MMOL/L (ref 136–145)
TACROLIMUS BLD-MCNC: 5.7 NG/ML (ref 5–15)
TROPONIN I SERPL DL<=0.01 NG/ML-MCNC: 0.03 NG/ML (ref 0–0.03)
WBC # BLD AUTO: 6.32 K/UL (ref 3.9–12.7)

## 2023-12-27 PROCEDURE — 36415 COLL VENOUS BLD VENIPUNCTURE: CPT | Mod: XB | Performed by: STUDENT IN AN ORGANIZED HEALTH CARE EDUCATION/TRAINING PROGRAM

## 2023-12-27 PROCEDURE — 25000003 PHARM REV CODE 250: Performed by: STUDENT IN AN ORGANIZED HEALTH CARE EDUCATION/TRAINING PROGRAM

## 2023-12-27 PROCEDURE — 80053 COMPREHEN METABOLIC PANEL: CPT | Performed by: STUDENT IN AN ORGANIZED HEALTH CARE EDUCATION/TRAINING PROGRAM

## 2023-12-27 PROCEDURE — 63600175 PHARM REV CODE 636 W HCPCS: Performed by: STUDENT IN AN ORGANIZED HEALTH CARE EDUCATION/TRAINING PROGRAM

## 2023-12-27 PROCEDURE — 25000003 PHARM REV CODE 250: Performed by: HOSPITALIST

## 2023-12-27 PROCEDURE — 80076 HEPATIC FUNCTION PANEL: CPT | Performed by: INTERNAL MEDICINE

## 2023-12-27 PROCEDURE — 36415 COLL VENOUS BLD VENIPUNCTURE: CPT | Performed by: INTERNAL MEDICINE

## 2023-12-27 PROCEDURE — 63600175 PHARM REV CODE 636 W HCPCS: Performed by: HOSPITALIST

## 2023-12-27 PROCEDURE — 21400001 HC TELEMETRY ROOM

## 2023-12-27 PROCEDURE — 93010 ELECTROCARDIOGRAM REPORT: CPT | Mod: ,,, | Performed by: INTERNAL MEDICINE

## 2023-12-27 PROCEDURE — 93005 ELECTROCARDIOGRAM TRACING: CPT

## 2023-12-27 PROCEDURE — 80197 ASSAY OF TACROLIMUS: CPT | Performed by: STUDENT IN AN ORGANIZED HEALTH CARE EDUCATION/TRAINING PROGRAM

## 2023-12-27 PROCEDURE — 84484 ASSAY OF TROPONIN QUANT: CPT | Performed by: STUDENT IN AN ORGANIZED HEALTH CARE EDUCATION/TRAINING PROGRAM

## 2023-12-27 PROCEDURE — 85025 COMPLETE CBC W/AUTO DIFF WBC: CPT | Performed by: STUDENT IN AN ORGANIZED HEALTH CARE EDUCATION/TRAINING PROGRAM

## 2023-12-27 PROCEDURE — 20000000 HC ICU ROOM

## 2023-12-27 PROCEDURE — 83605 ASSAY OF LACTIC ACID: CPT | Performed by: STUDENT IN AN ORGANIZED HEALTH CARE EDUCATION/TRAINING PROGRAM

## 2023-12-27 RX ADMIN — THERA TABS 1 TABLET: TAB at 09:12

## 2023-12-27 RX ADMIN — CLOPIDOGREL BISULFATE 75 MG: 75 TABLET ORAL at 09:12

## 2023-12-27 RX ADMIN — Medication 800 MG: at 08:12

## 2023-12-27 RX ADMIN — Medication 800 MG: at 09:12

## 2023-12-27 RX ADMIN — APIXABAN 5 MG: 5 TABLET, FILM COATED ORAL at 09:12

## 2023-12-27 RX ADMIN — SERTRALINE HYDROCHLORIDE 25 MG: 25 TABLET ORAL at 09:12

## 2023-12-27 RX ADMIN — FUROSEMIDE 40 MG: 10 INJECTION, SOLUTION INTRAVENOUS at 09:12

## 2023-12-27 RX ADMIN — INSULIN DETEMIR 8 UNITS: 100 INJECTION, SOLUTION SUBCUTANEOUS at 08:12

## 2023-12-27 RX ADMIN — INSULIN DETEMIR 8 UNITS: 100 INJECTION, SOLUTION SUBCUTANEOUS at 09:12

## 2023-12-27 RX ADMIN — ATORVASTATIN CALCIUM 80 MG: 40 TABLET, FILM COATED ORAL at 09:12

## 2023-12-27 RX ADMIN — TACROLIMUS 0.5 MG: 0.5 CAPSULE ORAL at 06:12

## 2023-12-27 RX ADMIN — INSULIN ASPART 4 UNITS: 100 INJECTION, SOLUTION INTRAVENOUS; SUBCUTANEOUS at 04:12

## 2023-12-27 RX ADMIN — ISOSORBIDE MONONITRATE 30 MG: 30 TABLET, EXTENDED RELEASE ORAL at 09:12

## 2023-12-27 RX ADMIN — APIXABAN 5 MG: 5 TABLET, FILM COATED ORAL at 08:12

## 2023-12-27 RX ADMIN — INSULIN ASPART 4 UNITS: 100 INJECTION, SOLUTION INTRAVENOUS; SUBCUTANEOUS at 12:12

## 2023-12-27 RX ADMIN — Medication 800 MG: at 04:12

## 2023-12-27 RX ADMIN — INSULIN ASPART 4 UNITS: 100 INJECTION, SOLUTION INTRAVENOUS; SUBCUTANEOUS at 09:12

## 2023-12-27 RX ADMIN — BUPROPION HYDROCHLORIDE 150 MG: 150 TABLET, FILM COATED, EXTENDED RELEASE ORAL at 06:12

## 2023-12-27 RX ADMIN — METOPROLOL SUCCINATE 25 MG: 25 TABLET, EXTENDED RELEASE ORAL at 09:12

## 2023-12-27 RX ADMIN — ASPIRIN 81 MG: 81 TABLET, COATED ORAL at 09:12

## 2023-12-27 RX ADMIN — Medication 1 CAPSULE: at 09:12

## 2023-12-27 RX ADMIN — ONDANSETRON 4 MG: 2 INJECTION INTRAMUSCULAR; INTRAVENOUS at 12:12

## 2023-12-27 RX ADMIN — QUETIAPINE FUMARATE 100 MG: 25 TABLET ORAL at 08:12

## 2023-12-27 RX ADMIN — PANTOPRAZOLE SODIUM 40 MG: 40 TABLET, DELAYED RELEASE ORAL at 09:12

## 2023-12-27 RX ADMIN — TACROLIMUS 0.5 MG: 0.5 CAPSULE ORAL at 09:12

## 2023-12-27 NOTE — LETTER
December 27, 2023    Tej Purdy  [address]  [place] [address]             [facility]  [address]  [place] [address]  Phone: [phone]                                  [facility]   [address]   [place] [address]  [phone]         [facility]   [address] [place] [address] [phone]         [facility]    [address]  [place] [address] [phone]   Re: Tej Purdy  Clinic number: [id number]    Dear Mr. Purdy:    You were recently admitted to an [facility] facility for cardiac (heart) problem.  Your physician has referred you to [facility].  Cardiac Rehab Phase 2 is an educational and exercise program, conducted in a outpatient setting, proven to help reduce your risk for recurrent heart events.    Cardiac rehab has two major parts:    1. Exercise training to help you achieve cardiovascular fitness while learning how to exercise safely and improve muscle strength and endurance.  Your exercise prescription will be based on the results of the cardiopulmonary stress test (CPX) which will be done before entering the program and at completion.  2. Education, counseling and training to help you understand your heart condition and find ways to reduce your risk of future heart problems.  The cardiac rehab team will help you learn how to cope with the stress of adjusting to a new lifestyle and to deal with your fears about the future.    Phase 2 is a 36-session program, meeting 3 times a week for 12 weeks.  Each session consists of an hour of exercise and half-hour dedicated to the educational topic of the day.  Class days vary per location.  Please contact your nearest facility for details.    Through cardiac rehab you will learn:  About your heart condition, medical therapies, and medication  Risk factors in y our lifestyle contributing to  heart disease  New strategies to modify your risk factors  About a healthy diet that can lower your blood cholesterol, control weight, help prevent or control high blood pressure, and diabetes  How to stop smoking  How to manage stress    If you are interested in getting started, call the [facility] of your choosing.     Sincerely,     [facility]

## 2023-12-27 NOTE — TELEPHONE ENCOUNTER
Information packet & letter regarding Phase II cardiac rehab was sent to patient.  Will contact patient in 2 weeks to see if interested.  Also, information letter sent to [facility].  Alma Cerna RN  Cardiac Rehab Nurse

## 2023-12-27 NOTE — SUBJECTIVE & OBJECTIVE
Interval History:  Patient was seen this morning, has no active complaints.  However later in the day patient started complaining of dizziness.  Reports that it started after he took his pills this morning.  Vital signs were stable.  Repeat blood work and stat EKG was obtained.      Review of Systems   Constitutional:  Positive for activity change. Negative for appetite change, chills and fever.   Respiratory:  Negative for wheezing.    Cardiovascular:  Negative for chest pain and leg swelling.   Gastrointestinal:  Negative for abdominal pain, nausea and vomiting.   Genitourinary: Negative.    Musculoskeletal: Negative.    Skin: Negative.    Neurological:  Positive for weakness.     Objective:     Vital Signs (Most Recent):  Temp: 98.1 °F (36.7 °C) (12/27/23 1522)  Pulse: 99 (12/27/23 1522)  Resp: 16 (12/27/23 1522)  BP: 109/72 (12/27/23 1522)  SpO2: (!) 91 % (12/27/23 1522) Vital Signs (24h Range):  Temp:  [97.9 °F (36.6 °C)-98.3 °F (36.8 °C)] 98.1 °F (36.7 °C)  Pulse:  [] 99  Resp:  [16-18] 16  SpO2:  [91 %-96 %] 91 %  BP: ()/(52-75) 109/72     Weight: 95.4 kg (210 lb 5.1 oz)  Body mass index is 28.52 kg/m².     Physical Exam  Vitals and nursing note reviewed.   Constitutional:       General: He is not in acute distress.  HENT:      Head: Normocephalic and atraumatic.   Eyes:      General: No scleral icterus.     Pupils: Pupils are equal, round, and reactive to light.   Cardiovascular:      Rate and Rhythm: Normal rate.      Pulses: Normal pulses.      Heart sounds: Murmur (systolic) heard.   Pulmonary:      Effort: Pulmonary effort is normal. No respiratory distress.      Breath sounds: No wheezing.   Abdominal:      General: Bowel sounds are normal. There is no distension.      Palpations: Abdomen is soft.      Tenderness: There is no abdominal tenderness. There is no guarding.   Musculoskeletal:      Cervical back: Neck supple.      Right lower leg: No edema.      Left lower leg: No edema.   Skin:    "  General: Skin is warm and dry.   Neurological:      General: No focal deficit present.      Mental Status: He is alert.              CRANIAL NERVES     CN III, IV, VI   Pupils are equal, round, and reactive to light.       Significant Labs: All pertinent labs within the past 24 hours have been reviewed.  CMP:   Recent Labs   Lab 12/26/23  1314 12/27/23  0533 12/27/23  1322    137 139   K 3.8 4.2 4.2    101 100   CO2 27 28 30*   * 138* 151*   BUN 23 27* 25*   CREATININE 1.2 1.3 1.3   CALCIUM 8.6* 8.7 8.9   PROT 6.6 6.6  6.6 6.8   ALBUMIN 3.6 3.5  3.5 3.6   BILITOT 1.1* 0.7  0.7 0.7   ALKPHOS 68 68  68 72   AST 16 16  16 16   ALT 12 13  13 14   ANIONGAP 8 8 9       Cardiac Markers:   No results for input(s): "CKMB", "MYOGLOBIN", "BNP", "TROPISTAT" in the last 48 hours.    Coagulation: No results for input(s): "PT", "INR", "APTT" in the last 48 hours.  Urine Studies:   No results for input(s): "COLORU", "APPEARANCEUA", "PHUR", "SPECGRAV", "PROTEINUA", "GLUCUA", "KETONESU", "BILIRUBINUA", "OCCULTUA", "NITRITE", "UROBILINOGEN", "LEUKOCYTESUR", "RBCUA", "WBCUA", "BACTERIA", "SQUAMEPITHEL", "HYALINECASTS" in the last 48 hours.    Invalid input(s): "WRIGHTSUR"      Significant Imaging: I have reviewed all pertinent imaging results/findings within the past 24 hours.    XR CHEST AP PORTABLE     CLINICAL HISTORY:  Shortness of breath     TECHNIQUE:  Single frontal view of the chest was performed.     COMPARISON:  Chest radiograph 06/01/2023, CT chest 09/18/2023     FINDINGS:  Monitoring leads overlie the chest.  Patient is rotated.  Resolution is limited by body habitus with underpenetration.     Cardiomediastinal silhouette is midline and prominent similar to prior.  Scattered linear opacities throughout the lungs consistent with minimal platelike scarring versus atelectasis.  Few scattered new subtle patchy subsegmental opacities at the bilateral mid to lower lung zones.  The lungs are otherwise " well expanded without consolidation, pleural effusion or pneumothorax.  No acute osseous process seen.  PA and lateral views can be obtained.     Impression:     Bilateral mid to lower lung zone new patchy subtle opacities which may reflect subsegmental atelectasis versus multifocal airspace process including aspiration or pneumonia in the proper clinical setting.  Consider short-term follow-up chest radiography after therapy to ensure resolution.        Electronically signed by: Stan Braun MD  Date:                                            12/22/2023  Time:                                           19:39

## 2023-12-27 NOTE — LETTER
December 27, 2023    Tej Purdy  [address]  [place] [address]             [facility]  [address] [facility].  [place] [address]  Phone: [phone]                                  [facility]   [address] [facility]   [place] [address]  [phone]         [facility]   [address] [place] [address] [phone]         [facility]    [address] [facility] [address]  [place] [address] [phone]   Re: Tej Purdy  Clinic number: [id number]    Dear Mr. Purdy:    You were recently admitted to an [facility] facility for cardiac (heart) problem.  Your physician has referred you to [facility].  Cardiac Rehab Phase 2 is an educational and exercise program, conducted in a outpatient setting, proven to help reduce your risk for recurrent heart events.    Cardiac rehab has two major parts:    1. Exercise training to help you achieve cardiovascular fitness while learning how to exercise safely and improve muscle strength and endurance.  Your exercise prescription will be based on the results of the cardiopulmonary stress test (CPX) which will be done before entering the program and at completion.  2. Education, counseling and training to help you understand your heart condition and find ways to reduce your risk of future heart problems.  The cardiac rehab team will help you learn how to cope with the stress of adjusting to a new lifestyle and to deal with your fears about the future.    Phase 2 is a 36-session program, meeting 3 times a week for 12 weeks.  Each session consists of an hour of exercise and half-hour dedicated to the educational topic of the day.  Class days vary per location.  Please contact your nearest facility for details.    Through cardiac rehab you will learn:  About your heart condition, medical therapies, and medication  Risk factors in y our lifestyle contributing  to heart disease  New strategies to modify your risk factors  About a healthy diet that can lower your blood cholesterol, control weight, help prevent or control high blood pressure, and diabetes  How to stop smoking  How to manage stress    If you are interested in getting started, call the [facility] of your choosing.     Sincerely,     [facility]

## 2023-12-27 NOTE — TREATMENT PLAN
Hepatology Treatment Plan    Tej Purdy is a 68 y.o. male admitted to hospital 2023 (Hospital Day: 6) due to Acute diastolic (congestive) heart failure.     Interval History  LHC yesterday for newly reduced EF.   Had PCI in Lcx  Cardiology recommending triple therapy x1 month then plavix/eliquis x 1 year     Prograf level 5.7    LFTs stable    Objective  Temp:  [97.9 °F (36.6 °C)-98.3 °F (36.8 °C)] 97.9 °F (36.6 °C) (1214)  Pulse:  [] 101 (1214)  BP: ()/(52-75) 96/53 (1214)  Resp:  [17-18] 18 (1214)  SpO2:  [91 %-96 %] 91 % (1214)        Laboratory    Lab Results   Component Value Date    WBC 6.32 2023    HGB 8.5 (L) 2023    HCT 30.9 (L) 2023    MCV 69 (L) 2023     2023       Lab Results   Component Value Date     2023    K 4.2 2023     2023    CO2 28 2023    BUN 27 (H) 2023    CREATININE 1.3 2023    CALCIUM 8.7 2023       Lab Results   Component Value Date    ALBUMIN 3.5 2023    ALBUMIN 3.5 2023    ALT 13 2023    ALT 13 2023    AST 16 2023    AST 16 2023    GGT 48 2021    ALKPHOS 68 2023    ALKPHOS 68 2023    BILITOT 0.7 2023    BILITOT 0.7 2023       Lab Results   Component Value Date    INR 1.3 (H) 2023    INR 1.2 2022    INR 1.2 2022       MELD 3.0: 14 at 6/3/2023  4:18 AM  MELD-Na: 14 at 6/3/2023  4:18 AM  Calculated from:  Serum Creatinine: 1.4 mg/dL at 6/3/2023  4:18 AM  Serum Sodium: 136 mmol/L at 6/3/2023  4:18 AM  Total Bilirubin: 0.5 mg/dL (Using min of 1 mg/dL) at 6/3/2023  4:18 AM  Serum Albumin: 2.7 g/dL at 6/3/2023  4:18 AM  INR(ratio): 1.3 at 2023 11:09 PM  Age at listin years  Sex: Male at 6/3/2023  4:18 AM      Assessment  Tej Purdy is a 68 y.o. male with history of history of cholangiocarcinoma (status post liver transplant in 2022),  Aortic Stenosis, Type  2 Diabetes, Hypertension presents to the ED with CHF exacerbation (EF 20-25%, moderate to severe AS). Labs show excellent allograft function on prograf monotherapy. Patient reports good compliance. Follows up with Dr. Maxwell in clinic.  Hepatology consulted for IS management. Doppler evaluation of liver was satisfactory with new mild intrahepatic ductal dilation in the left hepatic lobe; trace ascites in LLQ. CA 19-9 and CEA within normal limits. Decrease Prograf from 1 mg BID to 0.5 mg BID on 12/24. Underwent LHC on 12/26 for newly reduced EF. Severe CAD in Lcx, underwent PCI. Now on triple therapy for one month than plavix/eliquis for one year. Discharging.      Problem List:  Cholangiocarcinoma (status post liver transplant in 01/2022)  On chronic immunosuppression      Plan  - Continue Prograf 0.5 mg BID at discharge  - Will arrange for outpatient lab check next week  - Has follow up with Dr. Maxwell in one month  - Plan of care was discussed with primary team      Thank you for involving us in the care of Tej Purdy. Please call with any additional concerns or questions.    Michael Izquierdo  Gastroenterology and Hepatology Fellow, PGY-V

## 2023-12-27 NOTE — ASSESSMENT & PLAN NOTE
Patient's FSGs are controlled on current medication regimen.  Last A1c reviewed-   Lab Results   Component Value Date    HGBA1C 6.5 (H) 12/23/2023     Most recent fingerstick glucose reviewed-   Recent Labs   Lab 12/27/23  0924 12/27/23  1050 12/27/23  1213 12/27/23  1524   POCTGLUCOSE 200* 180* 184* 103       Current correctional scale  Low  Maintain anti-hyperglycemic dose as follows-   Antihyperglycemics (From admission, onward)    Start     Stop Route Frequency Ordered    12/23/23 0715  insulin aspart U-100 pen 4 Units         -- SubQ 3 times daily with meals 12/23/23 0020    12/23/23 0120  insulin aspart U-100 pen 0-5 Units         -- SubQ Before meals & nightly PRN 12/23/23 0021    12/23/23 0030  insulin detemir U-100 (Levemir) pen 8 Units         -- SubQ 2 times daily 12/23/23 0020        Hold Oral hypoglycemics while patient is in the hospital.

## 2023-12-27 NOTE — ASSESSMENT & PLAN NOTE
Chronic, controlled. Latest blood pressure and vitals reviewed-     Temp:  [97.9 °F (36.6 °C)-98.3 °F (36.8 °C)]   Pulse:  []   Resp:  [16-18]   BP: ()/(52-75)   SpO2:  [91 %-96 %] .     Amlodipine discontinued.  Started patient on metoprolol  While in the hospital, will manage blood pressure as follows; Continue home antihypertensive regimen

## 2023-12-27 NOTE — PLAN OF CARE
Adequate for discharge.     Problem: Adult Inpatient Plan of Care  Goal: Plan of Care Review  Outcome: Met  Goal: Patient-Specific Goal (Individualized)  Outcome: Met  Goal: Absence of Hospital-Acquired Illness or Injury  Outcome: Met  Goal: Optimal Comfort and Wellbeing  Outcome: Met  Goal: Readiness for Transition of Care  Outcome: Met     Problem: Diabetes Comorbidity  Goal: Blood Glucose Level Within Targeted Range  Outcome: Met     Problem: Infection  Goal: Absence of Infection Signs and Symptoms  Outcome: Met     Problem: Fall Injury Risk  Goal: Absence of Fall and Fall-Related Injury  Outcome: Met

## 2023-12-27 NOTE — PLAN OF CARE
CM spoke with pt in room to discuss d/c plan.  He states he was going to be d/c'd, but now has developed light headedness and nausea.  He took some new meds this morning and feels like he's having bad reaction.  He has notified nurse.    POLLY ÁlvarezN, BS, RN, [facility]

## 2023-12-27 NOTE — PROGRESS NOTES
[facility] ([address] Medicine  Progress Note    Patient Name: Tej Purdy  MRN: [id number]  Patient Class: IP- Inpatient   Admission Date: 12/22/2023  Length of Stay: 4 days  Attending Physician: Sherri Vanessa MD  Primary Care Provider: Thais Maxwell MD        Subjective:     Principal Problem:Acute diastolic (congestive) heart failure        HPI:  68-year-old WM, with history CASTILLO cirrhosis s/p Liver Transplant, Aortic Stenosis, Type 2 Diabetes, Hypertension presents to the ED with complaints of fatigue and dyspnea.   He reports that he has been having progressive dyspnea over the last 3 days.  Currently dyspnea present at rest, with minimal exertion - reporting walking 2-3 feet would cause dyspnea, he has orthopnea as well.  States he can't sleep on his side at home and has been getting up and sitting in his recliner.  Having difficulty sleeping and resulting fatigue from insomnia.      He is not taking diuretics as outpatient regularly, his wife assists in organizing/managing his medications, she is not present at bedside tonight.   He denies any chest pain, has had some chest tightness this evening.   He has received 40mg IV lasix in the ED and during my interview had total 900cc UOP in urinal (approx 23:00)     He is a former tobacco smoker, reports marijuana use(smoked) recently, no alcohol use       Overview/Hospital Course:  Echo showed severe reduced EF of 20-25%, moderate to severe low gradient aortic stenosis.  Cardiology was consulted.  As patient has Q-waves on the EKG and significant reduction in EF, there is a concern that patient had an MI prior to presentation.  Interventional Cardiology consulted.  Started patient on aspirin, statin and a beta blocker.  Patient underwent angiography, 90% stenosis noted in the left circumflex artery, stent placed.  Patient started on dual antiplatelet therapy.    Interval History:  Patient was seen this morning, has no  active complaints.  However later in the day patient started complaining of dizziness.  Reports that it started after he took his pills this morning.  Vital signs were stable.  Repeat blood work and stat EKG was obtained.      Review of Systems   Constitutional:  Positive for activity change. Negative for appetite change, chills and fever.   Respiratory:  Negative for wheezing.    Cardiovascular:  Negative for chest pain and leg swelling.   Gastrointestinal:  Negative for abdominal pain, nausea and vomiting.   Genitourinary: Negative.    Musculoskeletal: Negative.    Skin: Negative.    Neurological:  Positive for weakness.     Objective:     Vital Signs (Most Recent):  Temp: 98.1 °F (36.7 °C) (12/27/23 1522)  Pulse: 99 (12/27/23 1522)  Resp: 16 (12/27/23 1522)  BP: 109/72 (12/27/23 1522)  SpO2: (!) 91 % (12/27/23 1522) Vital Signs (24h Range):  Temp:  [97.9 °F (36.6 °C)-98.3 °F (36.8 °C)] 98.1 °F (36.7 °C)  Pulse:  [] 99  Resp:  [16-18] 16  SpO2:  [91 %-96 %] 91 %  BP: ()/(52-75) 109/72     Weight: 95.4 kg (210 lb 5.1 oz)  Body mass index is 28.52 kg/m².     Physical Exam  Vitals and nursing note reviewed.   Constitutional:       General: He is not in acute distress.  HENT:      Head: Normocephalic and atraumatic.   Eyes:      General: No scleral icterus.     Pupils: Pupils are equal, round, and reactive to light.   Cardiovascular:      Rate and Rhythm: Normal rate.      Pulses: Normal pulses.      Heart sounds: Murmur (systolic) heard.   Pulmonary:      Effort: Pulmonary effort is normal. No respiratory distress.      Breath sounds: No wheezing.   Abdominal:      General: Bowel sounds are normal. There is no distension.      Palpations: Abdomen is soft.      Tenderness: There is no abdominal tenderness. There is no guarding.   Musculoskeletal:      Cervical back: Neck supple.      Right lower leg: No edema.      Left lower leg: No edema.   Skin:     General: Skin is warm and dry.   Neurological:       "General: No focal deficit present.      Mental Status: He is alert.              CRANIAL NERVES     CN III, IV, VI   Pupils are equal, round, and reactive to light.       Significant Labs: All pertinent labs within the past 24 hours have been reviewed.  CMP:   Recent Labs   Lab 12/26/23  1314 12/27/23  0533 12/27/23  1322    137 139   K 3.8 4.2 4.2    101 100   CO2 27 28 30*   * 138* 151*   BUN 23 27* 25*   CREATININE 1.2 1.3 1.3   CALCIUM 8.6* 8.7 8.9   PROT 6.6 6.6  6.6 6.8   ALBUMIN 3.6 3.5  3.5 3.6   BILITOT 1.1* 0.7  0.7 0.7   ALKPHOS 68 68  68 72   AST 16 16  16 16   ALT 12 13  13 14   ANIONGAP 8 8 9       Cardiac Markers:   No results for input(s): "CKMB", "MYOGLOBIN", "BNP", "TROPISTAT" in the last 48 hours.    Coagulation: No results for input(s): "PT", "INR", "APTT" in the last 48 hours.  Urine Studies:   No results for input(s): "COLORU", "APPEARANCEUA", "PHUR", "SPECGRAV", "PROTEINUA", "GLUCUA", "KETONESU", "BILIRUBINUA", "OCCULTUA", "NITRITE", "UROBILINOGEN", "LEUKOCYTESUR", "RBCUA", "WBCUA", "BACTERIA", "SQUAMEPITHEL", "HYALINECASTS" in the last 48 hours.    Invalid input(s): "WRIGHTSUR"      Significant Imaging: I have reviewed all pertinent imaging results/findings within the past 24 hours.    XR CHEST AP PORTABLE     CLINICAL HISTORY:  Shortness of breath     TECHNIQUE:  Single frontal view of the chest was performed.     COMPARISON:  Chest radiograph 06/01/2023, CT chest 09/18/2023     FINDINGS:  Monitoring leads overlie the chest.  Patient is rotated.  Resolution is limited by body habitus with underpenetration.     Cardiomediastinal silhouette is midline and prominent similar to prior.  Scattered linear opacities throughout the lungs consistent with minimal platelike scarring versus atelectasis.  Few scattered new subtle patchy subsegmental opacities at the bilateral mid to lower lung zones.  The lungs are otherwise well expanded without consolidation, pleural effusion or " pneumothorax.  No acute osseous process seen.  PA and lateral views can be obtained.     Impression:     Bilateral mid to lower lung zone new patchy subtle opacities which may reflect subsegmental atelectasis versus multifocal airspace process including aspiration or pneumonia in the proper clinical setting.  Consider short-term follow-up chest radiography after therapy to ensure resolution.        Electronically signed by: Stan Braun MD  Date:                                            12/22/2023  Time:                                           19:39    Assessment/Plan:      Dizziness     later in the day patient started complaining of dizziness.  No weakness or numbness.  Denies chest pain or palpitations.      Reports that it started after he took his pills this morning.  Vital signs were stable.  Repeat blood work and stat EKG was obtained.    Unstable angina  Echo showed severe reduced EF of 20-25%, moderate to severe low gradient aortic stenosis.  Cardiology was consulted.  As patient has Q-waves on the EKG and significant reduction in EF, there is a concern that patient had an MI prior to presentation.   Started patient on aspirin, statin and a beta blocker.  -cardiology consulted appreciate recommendation, concern for recent MI.  Underwent PCI with stenting placement .  -currently on aspirin, beta blocker and statin.  Can initiate GDM T as blood pressure tolerates.      HFrEF (heart failure with reduced ejection fraction)    -patient with orthopnea, dyspnea at rest and exertion  -hx of preserved EF  -patient with hx of aortic stenosis, Repeat ECHO showed severe reduced EF of 20-25%, moderate to severe low gradient aortic stenosis.  -s/p 40mg IV lasix in ED and diuresing - on 60mg IV lasix daily   -cardiology consulted appreciate recommendation, concern for recent MI.  Underwent PCI with stenting placement .  -currently on aspirin, beta blocker and statin.  Can initiate GDM T as blood pressure  tolerates.    Nonrheumatic aortic valve stenosis  Echo showed severe reduced EF of 20-25%, moderate to severe low gradient aortic stenosis.      Anemia due to unknown mechanism  Patient's anemia is currently uncontrolled. Has not received any PRBCs to date. Etiology likely d/t Iron deficiency  Current CBC reviewed-   Lab Results   Component Value Date    HGB 8.6 (L) 12/25/2023    HCT 30.9 (L) 12/25/2023     Monitor serial CBC and transfuse if patient becomes hemodynamically unstable, symptomatic or H/H drops below 7/21.    S/P liver transplant  -continue tacrolimus  -hepatology following    Persistent atrial fibrillation  Patient with Persistent (7 days or more) atrial fibrillation which is controlled currently with Amiodarone. Patient is currently in atrial fibrillation.[id number] Score: 3. HASBLED Score: . Anticoagulation indicated. Anticoagulation done with apixaban .    Type 2 diabetes mellitus, with long-term current use of insulin  Patient's FSGs are controlled on current medication regimen.  Last A1c reviewed-   Lab Results   Component Value Date    HGBA1C 6.5 (H) 12/23/2023     Most recent fingerstick glucose reviewed-   Recent Labs   Lab 12/27/23  0924 12/27/23  1050 12/27/23  1213 12/27/23  1524   POCTGLUCOSE 200* 180* 184* 103       Current correctional scale  Low  Maintain anti-hyperglycemic dose as follows-   Antihyperglycemics (From admission, onward)      Start     Stop Route Frequency Ordered    12/23/23 0715  insulin aspart U-100 pen 4 Units         -- SubQ 3 times daily with meals 12/23/23 0020    12/23/23 0120  insulin aspart U-100 pen 0-5 Units         -- SubQ Before meals & nightly PRN 12/23/23 0021    12/23/23 0030  insulin detemir U-100 (Levemir) pen 8 Units         -- SubQ 2 times daily 12/23/23 0020          Hold Oral hypoglycemics while patient is in the hospital.        Primary hypertension  Chronic, controlled. Latest blood pressure and vitals reviewed-     Temp:  [97.9 °F (36.6 °C)-98.3  °F (36.8 °C)]   Pulse:  []   Resp:  [16-18]   BP: ()/(52-75)   SpO2:  [91 %-96 %] .     Amlodipine discontinued.  Started patient on metoprolol  While in the hospital, will manage blood pressure as follows; Continue home antihypertensive regimen      VTE Risk Mitigation (From admission, onward)           Ordered     apixaban tablet 5 mg  2 times daily         12/26/23 1202     Reason for No Pharmacological VTE Prophylaxis  Once        Question:  Reasons:  Answer:  Already adequately anticoagulated on oral Anticoagulants    12/23/23 0020     IP VTE HIGH RISK PATIENT  Once         12/23/23 0020     Place sequential compression device  Until discontinued         12/23/23 0020                    Discharge Planning   FELISHA: 12/27/2023     Code Status: Full Code   Is the patient medically ready for discharge?: No    Reason for patient still in hospital (select all that apply): Patient trending condition, Laboratory test, and Treatment  Discharge Plan A: Home                  Sherri Vanessa MD  Department of Hospital Medicine   [facility] - Intensive Care ([id number])

## 2023-12-28 PROBLEM — R10.31 RIGHT LOWER QUADRANT PAIN: Status: ACTIVE | Noted: 2023-12-28

## 2023-12-28 PROBLEM — N23 RENAL COLIC ON RIGHT SIDE: Status: ACTIVE | Noted: 2023-12-28

## 2023-12-28 LAB
ALBUMIN SERPL BCP-MCNC: 3.5 G/DL (ref 3.5–5.2)
ALBUMIN SERPL BCP-MCNC: 3.5 G/DL (ref 3.5–5.2)
ALP SERPL-CCNC: 74 U/L (ref 55–135)
ALP SERPL-CCNC: 74 U/L (ref 55–135)
ALT SERPL W/O P-5'-P-CCNC: 13 U/L (ref 10–44)
ALT SERPL W/O P-5'-P-CCNC: 13 U/L (ref 10–44)
ANION GAP SERPL CALC-SCNC: 10 MMOL/L (ref 8–16)
ANISOCYTOSIS BLD QL SMEAR: SLIGHT
AST SERPL-CCNC: 15 U/L (ref 10–40)
AST SERPL-CCNC: 15 U/L (ref 10–40)
BASOPHILS # BLD AUTO: 0.07 K/UL (ref 0–0.2)
BASOPHILS NFR BLD: 1 % (ref 0–1.9)
BILIRUB DIRECT SERPL-MCNC: 0.3 MG/DL (ref 0.1–0.3)
BILIRUB SERPL-MCNC: 0.6 MG/DL (ref 0.1–1)
BILIRUB SERPL-MCNC: 0.6 MG/DL (ref 0.1–1)
BUN SERPL-MCNC: 28 MG/DL (ref 8–23)
CALCIUM SERPL-MCNC: 8.6 MG/DL (ref 8.7–10.5)
CHLORIDE SERPL-SCNC: 100 MMOL/L (ref 95–110)
CO2 SERPL-SCNC: 27 MMOL/L (ref 23–29)
CREAT SERPL-MCNC: 1.3 MG/DL (ref 0.5–1.4)
DIFFERENTIAL METHOD BLD: ABNORMAL
EOSINOPHIL # BLD AUTO: 0.2 K/UL (ref 0–0.5)
EOSINOPHIL NFR BLD: 2.4 % (ref 0–8)
ERYTHROCYTE [DISTWIDTH] IN BLOOD BY AUTOMATED COUNT: 19.6 % (ref 11.5–14.5)
EST. GFR  (NO RACE VARIABLE): 59.1 ML/MIN/1.73 M^2
GLUCOSE SERPL-MCNC: 135 MG/DL (ref 70–110)
HCT VFR BLD AUTO: 30.3 % (ref 40–54)
HGB BLD-MCNC: 8.4 G/DL (ref 14–18)
IMM GRANULOCYTES # BLD AUTO: 0.1 K/UL (ref 0–0.04)
IMM GRANULOCYTES NFR BLD AUTO: 1.4 % (ref 0–0.5)
LYMPHOCYTES # BLD AUTO: 1.2 K/UL (ref 1–4.8)
LYMPHOCYTES NFR BLD: 16.4 % (ref 18–48)
MCH RBC QN AUTO: 19 PG (ref 27–31)
MCHC RBC AUTO-ENTMCNC: 27.7 G/DL (ref 32–36)
MCV RBC AUTO: 68 FL (ref 82–98)
MONOCYTES # BLD AUTO: 1 K/UL (ref 0.3–1)
MONOCYTES NFR BLD: 13.2 % (ref 4–15)
NEUTROPHILS # BLD AUTO: 4.7 K/UL (ref 1.8–7.7)
NEUTROPHILS NFR BLD: 65.6 % (ref 38–73)
NRBC BLD-RTO: 0 /100 WBC
OVALOCYTES BLD QL SMEAR: ABNORMAL
PLATELET # BLD AUTO: 154 K/UL (ref 150–450)
PMV BLD AUTO: ABNORMAL FL (ref 9.2–12.9)
POCT GLUCOSE: 133 MG/DL (ref 70–110)
POCT GLUCOSE: 133 MG/DL (ref 70–110)
POCT GLUCOSE: 142 MG/DL (ref 70–110)
POCT GLUCOSE: 184 MG/DL (ref 70–110)
POIKILOCYTOSIS BLD QL SMEAR: SLIGHT
POLYCHROMASIA BLD QL SMEAR: ABNORMAL
POTASSIUM SERPL-SCNC: 4 MMOL/L (ref 3.5–5.1)
PROT SERPL-MCNC: 6.5 G/DL (ref 6–8.4)
PROT SERPL-MCNC: 6.5 G/DL (ref 6–8.4)
RBC # BLD AUTO: 4.43 M/UL (ref 4.6–6.2)
SODIUM SERPL-SCNC: 137 MMOL/L (ref 136–145)
SPHEROCYTES BLD QL SMEAR: ABNORMAL
TACROLIMUS BLD-MCNC: 5.4 NG/ML (ref 5–15)
WBC # BLD AUTO: 7.2 K/UL (ref 3.9–12.7)

## 2023-12-28 PROCEDURE — 25000003 PHARM REV CODE 250: Performed by: STUDENT IN AN ORGANIZED HEALTH CARE EDUCATION/TRAINING PROGRAM

## 2023-12-28 PROCEDURE — 80197 ASSAY OF TACROLIMUS: CPT | Performed by: STUDENT IN AN ORGANIZED HEALTH CARE EDUCATION/TRAINING PROGRAM

## 2023-12-28 PROCEDURE — 80076 HEPATIC FUNCTION PANEL: CPT | Performed by: INTERNAL MEDICINE

## 2023-12-28 PROCEDURE — 63600175 PHARM REV CODE 636 W HCPCS: Performed by: STUDENT IN AN ORGANIZED HEALTH CARE EDUCATION/TRAINING PROGRAM

## 2023-12-28 PROCEDURE — 20000000 HC ICU ROOM

## 2023-12-28 PROCEDURE — 25000003 PHARM REV CODE 250: Performed by: HOSPITALIST

## 2023-12-28 PROCEDURE — 36415 COLL VENOUS BLD VENIPUNCTURE: CPT | Performed by: INTERNAL MEDICINE

## 2023-12-28 PROCEDURE — 80053 COMPREHEN METABOLIC PANEL: CPT | Performed by: STUDENT IN AN ORGANIZED HEALTH CARE EDUCATION/TRAINING PROGRAM

## 2023-12-28 PROCEDURE — 85025 COMPLETE CBC W/AUTO DIFF WBC: CPT | Performed by: STUDENT IN AN ORGANIZED HEALTH CARE EDUCATION/TRAINING PROGRAM

## 2023-12-28 PROCEDURE — 21400001 HC TELEMETRY ROOM

## 2023-12-28 RX ORDER — HYDROCODONE BITARTRATE AND ACETAMINOPHEN 5; 325 MG/1; MG/1
1 TABLET ORAL EVERY 6 HOURS PRN
Status: DISCONTINUED | OUTPATIENT
Start: 2023-12-28 | End: 2024-01-02 | Stop reason: HOSPADM

## 2023-12-28 RX ADMIN — INSULIN ASPART 4 UNITS: 100 INJECTION, SOLUTION INTRAVENOUS; SUBCUTANEOUS at 12:12

## 2023-12-28 RX ADMIN — ISOSORBIDE MONONITRATE 30 MG: 30 TABLET, EXTENDED RELEASE ORAL at 08:12

## 2023-12-28 RX ADMIN — QUETIAPINE FUMARATE 100 MG: 25 TABLET ORAL at 09:12

## 2023-12-28 RX ADMIN — APIXABAN 5 MG: 5 TABLET, FILM COATED ORAL at 09:12

## 2023-12-28 RX ADMIN — Medication 1 CAPSULE: at 08:12

## 2023-12-28 RX ADMIN — THERA TABS 1 TABLET: TAB at 08:12

## 2023-12-28 RX ADMIN — INSULIN DETEMIR 8 UNITS: 100 INJECTION, SOLUTION SUBCUTANEOUS at 09:12

## 2023-12-28 RX ADMIN — Medication 800 MG: at 09:12

## 2023-12-28 RX ADMIN — HYDROCODONE BITARTRATE AND ACETAMINOPHEN 1 TABLET: 5; 325 TABLET ORAL at 02:12

## 2023-12-28 RX ADMIN — PANTOPRAZOLE SODIUM 40 MG: 40 TABLET, DELAYED RELEASE ORAL at 08:12

## 2023-12-28 RX ADMIN — CLOPIDOGREL BISULFATE 75 MG: 75 TABLET ORAL at 08:12

## 2023-12-28 RX ADMIN — APIXABAN 5 MG: 5 TABLET, FILM COATED ORAL at 08:12

## 2023-12-28 RX ADMIN — SERTRALINE HYDROCHLORIDE 25 MG: 25 TABLET ORAL at 08:12

## 2023-12-28 RX ADMIN — SODIUM CHLORIDE 250 ML: 9 INJECTION, SOLUTION INTRAVENOUS at 12:12

## 2023-12-28 RX ADMIN — METOPROLOL SUCCINATE 25 MG: 25 TABLET, EXTENDED RELEASE ORAL at 08:12

## 2023-12-28 RX ADMIN — Medication 800 MG: at 02:12

## 2023-12-28 RX ADMIN — TACROLIMUS 0.5 MG: 0.5 CAPSULE ORAL at 05:12

## 2023-12-28 RX ADMIN — TACROLIMUS 0.5 MG: 0.5 CAPSULE ORAL at 08:12

## 2023-12-28 RX ADMIN — HYDROCODONE BITARTRATE AND ACETAMINOPHEN 1 TABLET: 5; 325 TABLET ORAL at 09:12

## 2023-12-28 RX ADMIN — Medication 800 MG: at 08:12

## 2023-12-28 RX ADMIN — BUPROPION HYDROCHLORIDE 150 MG: 150 TABLET, FILM COATED, EXTENDED RELEASE ORAL at 06:12

## 2023-12-28 RX ADMIN — ASPIRIN 81 MG: 81 TABLET, COATED ORAL at 08:12

## 2023-12-28 RX ADMIN — INSULIN DETEMIR 8 UNITS: 100 INJECTION, SOLUTION SUBCUTANEOUS at 08:12

## 2023-12-28 RX ADMIN — INSULIN ASPART 4 UNITS: 100 INJECTION, SOLUTION INTRAVENOUS; SUBCUTANEOUS at 05:12

## 2023-12-28 RX ADMIN — ATORVASTATIN CALCIUM 80 MG: 40 TABLET, FILM COATED ORAL at 08:12

## 2023-12-28 RX ADMIN — INSULIN ASPART 4 UNITS: 100 INJECTION, SOLUTION INTRAVENOUS; SUBCUTANEOUS at 08:12

## 2023-12-28 NOTE — SUBJECTIVE & OBJECTIVE
Interval History: Having new pain.    Review of Systems   Constitutional:  Negative for chills and fever.   Gastrointestinal:  Positive for abdominal pain. Negative for vomiting.   Neurological:  Positive for dizziness and light-headedness. Negative for syncope.     Objective:     Vital Signs (Most Recent):  Temp: 98 °F (36.7 °C) (12/28/23 1156)  Pulse: 69 (12/28/23 1206)  Resp: 18 (12/28/23 1156)  BP: (!) 85/53 (12/28/23 1206)  SpO2: (!) 92 % (12/28/23 1156) Vital Signs (24h Range):  Temp:  [97.9 °F (36.6 °C)-98.4 °F (36.9 °C)] 98 °F (36.7 °C)  Pulse:  [] 69  Resp:  [16-18] 18  SpO2:  [89 %-93 %] 92 %  BP: ()/(53-72) 85/53     Weight: 95.4 kg (210 lb 5.1 oz)  Body mass index is 28.52 kg/m².    Intake/Output Summary (Last 24 hours) at 12/28/2023 1359  Last data filed at 12/28/2023 0856  Gross per 24 hour   Intake 240 ml   Output 800 ml   Net -560 ml         Physical Exam  Vitals and nursing note reviewed.   Constitutional:       General: He is not in acute distress.     Appearance: He is well-developed. He is not toxic-appearing or diaphoretic.   Pulmonary:      Effort: Pulmonary effort is normal. No respiratory distress.   Abdominal:      Palpations: Abdomen is soft.      Tenderness: There is abdominal tenderness. There is no right CVA tenderness or rebound.   Neurological:      Mental Status: He is alert and oriented to person, place, and time. Mental status is at baseline.      Motor: No seizure activity.   Psychiatric:         Attention and Perception: Attention normal.         Mood and Affect: Mood and affect normal.         Behavior: Behavior is cooperative.             Significant Labs: All pertinent labs within the past 24 hours have been reviewed.    Significant Imaging: I have reviewed all pertinent imaging results/findings within the past 24 hours.  X-Ray Chest AP Portable 12/22/23: FINDINGS:   Monitoring leads overlie the chest.  Patient is rotated.  Resolution is limited by body habitus with  underpenetration.   Cardiomediastinal silhouette is midline and prominent similar to prior.  Scattered linear opacities throughout the lungs consistent with minimal platelike scarring versus atelectasis.  Few scattered new subtle patchy subsegmental opacities at the bilateral mid to lower lung zones.  The lungs are otherwise well expanded without consolidation, pleural effusion or pneumothorax.  No acute osseous process seen.  PA and lateral views can be obtained.   Impression:  Bilateral mid to lower lung zone new patchy subtle opacities which may reflect subsegmental atelectasis versus multifocal airspace process including aspiration or pneumonia in the proper clinical setting.  Consider short-term follow-up chest radiography after therapy to ensure resolution.   US Doppler Liver Transplant Post 12/23/23: FINDINGS:   Patient is status post orthotopic liver transplant 01/06/2022.  The liver demonstrates slightly heterogeneous echotexture, but to a lesser degree from prior.  No focal hepatic lesions are seen.  No fluid collections.   The common duct is not dilated, measuring 3 mm, previously 4 mm.  Mildly dilated intrahepatic ducts within left hepatic lobe, new from prior.   Color flow and spectral waveform analysis was performed.  The main portal vein, right portal vein, left portal vein, middle hepatic vein, right hepatic vein, left hepatic vein, and IVC are patent with proper directional flow.   Main portal vein velocity measures up to 27 cm/sec, previously 35 cm/sec.   IVC velocity measures 94 cm/sec, previously 150 cm/sec.   Anastomosis site of the main hepatic artery demonstrates a peak systolic velocity 91 cm/sec, previously 94 cm/s.   Main hepatic artery demonstrates resistive index 0.73 with normal waveform, previously 0.73.   Left hepatic artery shows resistive index 0.62 with normal waveform, previously 0.59.   Anterior branch of the right hepatic artery demonstrates resistive index 0.56 with normal  waveform, previously 0.69.   Posterior branch of the right hepatic artery demonstrates resistive index 0.74 with normal waveform, previously 0.68.   Bilateral pleural effusions.  Trace volume free fluid within the left lower quadrant.   Impression:  Satisfactory Doppler evaluation of the liver.   New mild left hepatic lobe intrahepatic ductal dilatation.   Bilateral pleural effusions.   Trace ascites within left lower quadrant.   Transthoracic echo complete 12/23/23:     Left Ventricle: The left ventricle is normal in size. Mildly increased wall thickness. There is concentric remodeling. There is severely reduced systolic function with a visually estimated ejection fraction of 20 - 25%. All walls except for the basal septum are hypokinetic. When directly compared to images from 7/5/2022 there has been a significant change in left ventricular systolic function.    LV Diastolic function: Unable to assess diastolic function due to E-A fusion. Average E/e' ratio is 11.    Right Ventricle: Normal right ventricular cavity size. Wall thickness is normal. Right ventricle wall motion  is normal. Systolic function is normal.    Left Atrium: Left atrium is severely dilated.    Aortic Valve: There is moderate to severe low gradient aortic stenosis. Aortic valve area by VTI is 0.97 cm². LVOT diameter is 2.4 cm. Aortic valve peak velocity is 3.24 m/s. Mean gradient is 28 mmHg. The dimensionless index is 0.21. There is no significant regurgitation.    Mitral Valve: There is mild to moderate regurgitation.    Tricuspid Valve: There is mild to moderate regurgitation.    Pulmonary Artery: The estimated pulmonary artery systolic pressure is 38 mm Hg.    IVC/SVC: Low central venous pressure.    Pericardium: There is a very small circumferential effusion; slightly more fluid is located laterally.  Left heart catheterization 12/26/23:    The 1st Mrg lesion was 90% stenosed with 0% stenosis post-intervention.    1st Mrg lesion: A .    1st  Mrg lesion: A STENT SYNERGY XD 3.0X24MM .    The estimated blood loss was none.    There was single vessel coronary artery disease.    The PCI was successful.

## 2023-12-28 NOTE — PROGRESS NOTES
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 He lives in [place], [place]. He is . His hepatologist is Dr. Thais Maxwell.               He presented to [facility] Emergency Department on 12/22/2023 with fatigue and progressive dyspnea over the last 3 days. He had dyspnea with minimal exertion (walking 2 to 3 feet) and orthopnea. He could not slep on his side and had been getting up and sitting in his recliner. He had difficulty sleeping resulting in fatigue from insomnia.              Chest X-ray showed patchy lung opacities. BNP was 539 pg/mL. He was given 40 mg of intravenous furosemide. He urinated 900 mL while in the emergency department. He was admitted to Hospital Medicine Team D.      Overview/Hospital Course:  Echocardiogram showed severely reduced ejection fraction of 20 to 25%, moderate to severe low gradient aortic stenosis. Cardiology was consulted. He had Q waves on EKG. There was concern that he had a myocardial infarction.  Interventional Cardiology was consulted. He was started on aspirin, a statin, and a beta-blocker. He underwent left heart catheterization via right radial artery showing 90% stenosis in the lateral circumflex artery. A stent was placed. He was started on dual antiplatelet therapy and isosorbide mononitrate. On 12/27/2023, he started having dizziness after taking his morning medications. Blood pressures were as low as 90s/50s while supine. The same happened the next morning, even without furosemide. Isosorbide mononitrate was stopped. He was orthostatic. He also developed new right lower quadrant abdominal pain that felt similar to prior nephrolithiasis.     Interval History: Having new pain.    Review of Systems   Constitutional:  Negative for chills and fever.   Gastrointestinal:  Positive for abdominal pain. Negative for vomiting.   Neurological:  Positive for dizziness and light-headedness. Negative for syncope.     Objective:     Vital Signs (Most Recent):  Temp: 98 °F (36.7 °C) (12/28/23 1156)  Pulse: 69 (12/28/23 1206)  Resp: 18 (12/28/23 1156)  BP: (!) 85/53 (12/28/23 1206)  SpO2: (!) 92 % (12/28/23 1156) Vital Signs (24h Range):  Temp:  [97.9 °F (36.6 °C)-98.4 °F (36.9 °C)] 98 °F (36.7 °C)  Pulse:  [] 69  Resp:  [16-18] 18  SpO2:  [89 %-93 %] 92 %  BP: ()/(53-72) 85/53     Weight: 95.4 kg (210 lb 5.1 oz)  Body mass index is 28.52 kg/m².    Intake/Output Summary (Last 24 hours) at 12/28/2023 1359  Last data filed at 12/28/2023 0856  Gross per 24 hour   Intake 240 ml   Output 800 ml   Net -560 ml         Physical Exam  Vitals and nursing note reviewed.   Constitutional:       General: He is not in acute distress.     Appearance: He is well-developed. He is not toxic-appearing or diaphoretic.   Pulmonary:      Effort: Pulmonary effort is normal. No respiratory distress.   Abdominal:      Palpations: Abdomen is soft.      Tenderness: There is abdominal tenderness. There is no right CVA tenderness or  rebound.   Neurological:      Mental Status: He is alert and oriented to person, place, and time. Mental status is at baseline.      Motor: No seizure activity.   Psychiatric:         Attention and Perception: Attention normal.         Mood and Affect: Mood and affect normal.         Behavior: Behavior is cooperative.             Significant Labs: All pertinent labs within the past 24 hours have been reviewed.    Significant Imaging: I have reviewed all pertinent imaging results/findings within the past 24 hours.  X-Ray Chest AP Portable 12/22/23: FINDINGS:   Monitoring leads overlie the chest.  Patient is rotated.  Resolution is limited by body habitus with underpenetration.   Cardiomediastinal silhouette is midline and prominent similar to prior.  Scattered linear opacities throughout the lungs consistent with minimal platelike scarring versus atelectasis.  Few scattered new subtle patchy subsegmental opacities at the bilateral mid to lower lung zones.  The lungs are otherwise well expanded without consolidation, pleural effusion or pneumothorax.  No acute osseous process seen.  PA and lateral views can be obtained.   Impression:  Bilateral mid to lower lung zone new patchy subtle opacities which may reflect subsegmental atelectasis versus multifocal airspace process including aspiration or pneumonia in the proper clinical setting.  Consider short-term follow-up chest radiography after therapy to ensure resolution.   US Doppler Liver Transplant Post 12/23/23: FINDINGS:   Patient is status post orthotopic liver transplant 01/06/2022.  The liver demonstrates slightly heterogeneous echotexture, but to a lesser degree from prior.  No focal hepatic lesions are seen.  No fluid collections.   The common duct is not dilated, measuring 3 mm, previously 4 mm.  Mildly dilated intrahepatic ducts within left hepatic lobe, new from prior.   Color flow and spectral waveform analysis was performed.  The main portal vein, right  portal vein, left portal vein, middle hepatic vein, right hepatic vein, left hepatic vein, and IVC are patent with proper directional flow.   Main portal vein velocity measures up to 27 cm/sec, previously 35 cm/sec.   IVC velocity measures 94 cm/sec, previously 150 cm/sec.   Anastomosis site of the main hepatic artery demonstrates a peak systolic velocity 91 cm/sec, previously 94 cm/s.   Main hepatic artery demonstrates resistive index 0.73 with normal waveform, previously 0.73.   Left hepatic artery shows resistive index 0.62 with normal waveform, previously 0.59.   Anterior branch of the right hepatic artery demonstrates resistive index 0.56 with normal waveform, previously 0.69.   Posterior branch of the right hepatic artery demonstrates resistive index 0.74 with normal waveform, previously 0.68.   Bilateral pleural effusions.  Trace volume free fluid within the left lower quadrant.   Impression:  Satisfactory Doppler evaluation of the liver.   New mild left hepatic lobe intrahepatic ductal dilatation.   Bilateral pleural effusions.   Trace ascites within left lower quadrant.   Transthoracic echo complete 12/23/23:     Left Ventricle: The left ventricle is normal in size. Mildly increased wall thickness. There is concentric remodeling. There is severely reduced systolic function with a visually estimated ejection fraction of 20 - 25%. All walls except for the basal septum are hypokinetic. When directly compared to images from 7/5/2022 there has been a significant change in left ventricular systolic function.    LV Diastolic function: Unable to assess diastolic function due to E-A fusion. Average E/e' ratio is 11.    Right Ventricle: Normal right ventricular cavity size. Wall thickness is normal. Right ventricle wall motion  is normal. Systolic function is normal.    Left Atrium: Left atrium is severely dilated.    Aortic Valve: There is moderate to severe low gradient aortic stenosis. Aortic valve area by VTI is  0.97 cm². LVOT diameter is 2.4 cm. Aortic valve peak velocity is 3.24 m/s. Mean gradient is 28 mmHg. The dimensionless index is 0.21. There is no significant regurgitation.    Mitral Valve: There is mild to moderate regurgitation.    Tricuspid Valve: There is mild to moderate regurgitation.    Pulmonary Artery: The estimated pulmonary artery systolic pressure is 38 mm Hg.    IVC/SVC: Low central venous pressure.    Pericardium: There is a very small circumferential effusion; slightly more fluid is located laterally.  Left heart catheterization 12/26/23:    The 1st Mrg lesion was 90% stenosed with 0% stenosis post-intervention.    1st Mrg lesion: A .    1st Mrg lesion: A STENT SYNERGY XD 3.0X24MM .    The estimated blood loss was none.    There was single vessel coronary artery disease.    The PCI was successful.    Assessment/Plan:      Right lower quadrant pain  Give hydrocodone-acetaminophen prn. Get CT renal stone study.    Orthostatic dizziness  Stop isosorbide mononitrate, which is a new medication. Give 250 mL of fluids. Recheck afterward.    Unstable angina  Coronary artery disease involving native coronary artery of native heart without angina pectoris   Presence of drug-eluting stent in left circumflex coronary artery   Stent placed. Started aspirin, clopidrogel, metoprolol, isosorbide mononitrate. Stop isosorbide mononitrate due to orthostatic hypotension.     HFrEF (heart failure with reduced ejection fraction)  Diuresed.    Nonrheumatic aortic valve stenosis  Echo showed severe reduced EF of 20-25%, moderate to severe low gradient aortic stenosis.      Anemia due to unknown mechanism  Patient's anemia is currently uncontrolled. Has not received any PRBCs to date. Etiology likely d/t Iron deficiency  Current CBC reviewed-   Lab Results   Component Value Date    HGB 8.6 (L) 12/25/2023    HCT 30.9 (L) 12/25/2023     Monitor serial CBC and transfuse if patient becomes hemodynamically unstable, symptomatic or  H/H drops below 7/21.    S/P liver transplant  Continue home tacrolimus.    Persistent atrial fibrillation  Continue home amiodarone, apixaban. Started metoprolol.    Type 2 diabetes mellitus, with long-term current use of insulin  He takes insulin glargine 22 units HS, insulin lispro 6 units TID with maels at home. Giving insulin detemir 8 units BID, insulin aspart 4 units TID with meals, sliding scale. Monitor Accuchecks.    Primary hypertension  Home amlodipine changed to metoprolol. Monitor blood pressures.      VTE Risk Mitigation (From admission, onward)           Ordered     apixaban tablet 5 mg  2 times daily         12/26/23 1202     Reason for No Pharmacological VTE Prophylaxis  Once        Question:  Reasons:  Answer:  Already adequately anticoagulated on oral Anticoagulants    12/23/23 0020     IP VTE HIGH RISK PATIENT  Once         12/23/23 0020     Place sequential compression device  Until discontinued         12/23/23 0020                    Discharge Planning   FEILSHA: 12/29/2023     Code Status: Full Code   Is the patient medically ready for discharge?: No    Reason for patient still in hospital (select all that apply): Patient new problem, Patient trending condition, and Treatment  Discharge Plan A: Home                  Sae Pearl MD  Department of Hospital Medicine   [facility] - Intensive Care ([place])

## 2023-12-28 NOTE — ASSESSMENT & PLAN NOTE
Coronary artery disease involving native coronary artery of native heart without angina pectoris   Presence of drug-eluting stent in left circumflex coronary artery   Stent placed. Started aspirin, clopidrogel, metoprolol, isosorbide mononitrate. Stop isosorbide mononitrate due to orthostatic hypotension.

## 2023-12-28 NOTE — NURSING
Pt report having dizziness and lightheadedness after AM medications. Orthostatic BP +. VS documented in flowsheet. Dr. Quevedo notified. Isosorbide d/c. NS 250cc bolus ordered. RN administered. No other orders at this time. Care ongoing.

## 2023-12-28 NOTE — ASSESSMENT & PLAN NOTE
He takes insulin glargine 22 units HS, insulin lispro 6 units TID with maels at home. Giving insulin detemir 8 units BID, insulin aspart 4 units TID with meals, sliding scale. Monitor Accuchecks.

## 2023-12-29 PROBLEM — R09.02 HYPOXIA: Status: ACTIVE | Noted: 2023-12-29

## 2023-12-29 PROBLEM — N20.0 NEPHROLITHIASIS: Status: ACTIVE | Noted: 2023-12-28

## 2023-12-29 LAB
ALBUMIN SERPL BCP-MCNC: 3.4 G/DL (ref 3.5–5.2)
ALBUMIN SERPL BCP-MCNC: 3.4 G/DL (ref 3.5–5.2)
ALP SERPL-CCNC: 77 U/L (ref 55–135)
ALP SERPL-CCNC: 77 U/L (ref 55–135)
ALT SERPL W/O P-5'-P-CCNC: 10 U/L (ref 10–44)
ALT SERPL W/O P-5'-P-CCNC: 10 U/L (ref 10–44)
ANION GAP SERPL CALC-SCNC: 5 MMOL/L (ref 8–16)
AST SERPL-CCNC: 14 U/L (ref 10–40)
AST SERPL-CCNC: 14 U/L (ref 10–40)
BASOPHILS # BLD AUTO: 0.06 K/UL (ref 0–0.2)
BASOPHILS NFR BLD: 1 % (ref 0–1.9)
BILIRUB DIRECT SERPL-MCNC: 0.2 MG/DL (ref 0.1–0.3)
BILIRUB SERPL-MCNC: 0.6 MG/DL (ref 0.1–1)
BILIRUB SERPL-MCNC: 0.6 MG/DL (ref 0.1–1)
BNP SERPL-MCNC: 743 PG/ML (ref 0–99)
BUN SERPL-MCNC: 28 MG/DL (ref 8–23)
CALCIUM SERPL-MCNC: 8.6 MG/DL (ref 8.7–10.5)
CHLORIDE SERPL-SCNC: 102 MMOL/L (ref 95–110)
CO2 SERPL-SCNC: 29 MMOL/L (ref 23–29)
CREAT SERPL-MCNC: 1.3 MG/DL (ref 0.5–1.4)
D DIMER PPP IA.FEU-MCNC: 1.86 MG/L FEU
DIFFERENTIAL METHOD BLD: ABNORMAL
EOSINOPHIL # BLD AUTO: 0.2 K/UL (ref 0–0.5)
EOSINOPHIL NFR BLD: 2.6 % (ref 0–8)
ERYTHROCYTE [DISTWIDTH] IN BLOOD BY AUTOMATED COUNT: 19.1 % (ref 11.5–14.5)
EST. GFR  (NO RACE VARIABLE): 59.1 ML/MIN/1.73 M^2
GLUCOSE SERPL-MCNC: 169 MG/DL (ref 70–110)
HCT VFR BLD AUTO: 30.1 % (ref 40–54)
HGB BLD-MCNC: 8.3 G/DL (ref 14–18)
IMM GRANULOCYTES # BLD AUTO: 0.02 K/UL (ref 0–0.04)
IMM GRANULOCYTES NFR BLD AUTO: 0.3 % (ref 0–0.5)
LYMPHOCYTES # BLD AUTO: 1.1 K/UL (ref 1–4.8)
LYMPHOCYTES NFR BLD: 18.3 % (ref 18–48)
MCH RBC QN AUTO: 19.5 PG (ref 27–31)
MCHC RBC AUTO-ENTMCNC: 27.6 G/DL (ref 32–36)
MCV RBC AUTO: 71 FL (ref 82–98)
MONOCYTES # BLD AUTO: 0.8 K/UL (ref 0.3–1)
MONOCYTES NFR BLD: 13.6 % (ref 4–15)
NEUTROPHILS # BLD AUTO: 3.7 K/UL (ref 1.8–7.7)
NEUTROPHILS NFR BLD: 64.2 % (ref 38–73)
NRBC BLD-RTO: 0 /100 WBC
PLATELET # BLD AUTO: 134 K/UL (ref 150–450)
PMV BLD AUTO: ABNORMAL FL (ref 9.2–12.9)
POCT GLUCOSE: 144 MG/DL (ref 70–110)
POCT GLUCOSE: 147 MG/DL (ref 70–110)
POCT GLUCOSE: 181 MG/DL (ref 70–110)
POCT GLUCOSE: 187 MG/DL (ref 70–110)
POTASSIUM SERPL-SCNC: 3.9 MMOL/L (ref 3.5–5.1)
PROT SERPL-MCNC: 6.4 G/DL (ref 6–8.4)
PROT SERPL-MCNC: 6.4 G/DL (ref 6–8.4)
RBC # BLD AUTO: 4.26 M/UL (ref 4.6–6.2)
SODIUM SERPL-SCNC: 136 MMOL/L (ref 136–145)
TACROLIMUS BLD-MCNC: 4.5 NG/ML (ref 5–15)
WBC # BLD AUTO: 5.8 K/UL (ref 3.9–12.7)

## 2023-12-29 PROCEDURE — 20000000 HC ICU ROOM

## 2023-12-29 PROCEDURE — 25000003 PHARM REV CODE 250: Performed by: STUDENT IN AN ORGANIZED HEALTH CARE EDUCATION/TRAINING PROGRAM

## 2023-12-29 PROCEDURE — 94761 N-INVAS EAR/PLS OXIMETRY MLT: CPT

## 2023-12-29 PROCEDURE — 25000003 PHARM REV CODE 250: Performed by: HOSPITALIST

## 2023-12-29 PROCEDURE — 63600175 PHARM REV CODE 636 W HCPCS: Performed by: STUDENT IN AN ORGANIZED HEALTH CARE EDUCATION/TRAINING PROGRAM

## 2023-12-29 PROCEDURE — 80053 COMPREHEN METABOLIC PANEL: CPT | Performed by: STUDENT IN AN ORGANIZED HEALTH CARE EDUCATION/TRAINING PROGRAM

## 2023-12-29 PROCEDURE — 80076 HEPATIC FUNCTION PANEL: CPT | Performed by: INTERNAL MEDICINE

## 2023-12-29 PROCEDURE — 83880 ASSAY OF NATRIURETIC PEPTIDE: CPT | Performed by: HOSPITALIST

## 2023-12-29 PROCEDURE — 21400001 HC TELEMETRY ROOM

## 2023-12-29 PROCEDURE — 36415 COLL VENOUS BLD VENIPUNCTURE: CPT | Mod: XB | Performed by: HOSPITALIST

## 2023-12-29 PROCEDURE — 80197 ASSAY OF TACROLIMUS: CPT | Performed by: STUDENT IN AN ORGANIZED HEALTH CARE EDUCATION/TRAINING PROGRAM

## 2023-12-29 PROCEDURE — 85379 FIBRIN DEGRADATION QUANT: CPT | Performed by: HOSPITALIST

## 2023-12-29 PROCEDURE — 85025 COMPLETE CBC W/AUTO DIFF WBC: CPT | Performed by: STUDENT IN AN ORGANIZED HEALTH CARE EDUCATION/TRAINING PROGRAM

## 2023-12-29 PROCEDURE — 36415 COLL VENOUS BLD VENIPUNCTURE: CPT | Performed by: INTERNAL MEDICINE

## 2023-12-29 RX ORDER — MIDODRINE HYDROCHLORIDE 5 MG/1
5 TABLET ORAL
Status: DISCONTINUED | OUTPATIENT
Start: 2023-12-29 | End: 2023-12-30

## 2023-12-29 RX ADMIN — TACROLIMUS 0.5 MG: 0.5 CAPSULE ORAL at 05:12

## 2023-12-29 RX ADMIN — Medication 800 MG: at 08:12

## 2023-12-29 RX ADMIN — METOPROLOL SUCCINATE 12.5 MG: 25 TABLET, EXTENDED RELEASE ORAL at 08:12

## 2023-12-29 RX ADMIN — INSULIN ASPART 4 UNITS: 100 INJECTION, SOLUTION INTRAVENOUS; SUBCUTANEOUS at 08:12

## 2023-12-29 RX ADMIN — SODIUM CHLORIDE 500 ML: 9 INJECTION, SOLUTION INTRAVENOUS at 08:12

## 2023-12-29 RX ADMIN — MIDODRINE HYDROCHLORIDE 5 MG: 5 TABLET ORAL at 12:12

## 2023-12-29 RX ADMIN — BUPROPION HYDROCHLORIDE 150 MG: 150 TABLET, FILM COATED, EXTENDED RELEASE ORAL at 09:12

## 2023-12-29 RX ADMIN — TACROLIMUS 0.5 MG: 0.5 CAPSULE ORAL at 08:12

## 2023-12-29 RX ADMIN — Medication 1 CAPSULE: at 08:12

## 2023-12-29 RX ADMIN — ASPIRIN 81 MG: 81 TABLET, COATED ORAL at 08:12

## 2023-12-29 RX ADMIN — Medication 800 MG: at 02:12

## 2023-12-29 RX ADMIN — APIXABAN 5 MG: 5 TABLET, FILM COATED ORAL at 08:12

## 2023-12-29 RX ADMIN — QUETIAPINE FUMARATE 100 MG: 25 TABLET ORAL at 08:12

## 2023-12-29 RX ADMIN — MIDODRINE HYDROCHLORIDE 5 MG: 5 TABLET ORAL at 05:12

## 2023-12-29 RX ADMIN — CLOPIDOGREL BISULFATE 75 MG: 75 TABLET ORAL at 08:12

## 2023-12-29 RX ADMIN — INSULIN DETEMIR 8 UNITS: 100 INJECTION, SOLUTION SUBCUTANEOUS at 08:12

## 2023-12-29 RX ADMIN — ATORVASTATIN CALCIUM 80 MG: 40 TABLET, FILM COATED ORAL at 08:12

## 2023-12-29 RX ADMIN — INSULIN ASPART 4 UNITS: 100 INJECTION, SOLUTION INTRAVENOUS; SUBCUTANEOUS at 05:12

## 2023-12-29 RX ADMIN — SERTRALINE HYDROCHLORIDE 25 MG: 25 TABLET ORAL at 08:12

## 2023-12-29 RX ADMIN — THERA TABS 1 TABLET: TAB at 08:12

## 2023-12-29 RX ADMIN — PANTOPRAZOLE SODIUM 40 MG: 40 TABLET, DELAYED RELEASE ORAL at 08:12

## 2023-12-29 RX ADMIN — INSULIN ASPART 4 UNITS: 100 INJECTION, SOLUTION INTRAVENOUS; SUBCUTANEOUS at 12:12

## 2023-12-29 NOTE — SUBJECTIVE & OBJECTIVE
Interval History: Having new hypoxia. Still lightheaded. Kidney stone pain resolved.    Review of Systems   Constitutional:  Negative for chills and fever.   Respiratory:  Positive for shortness of breath.    Gastrointestinal:  Negative for abdominal pain and vomiting.   Neurological:  Positive for dizziness and light-headedness. Negative for syncope.     Objective:     Vital Signs (Most Recent):  Temp: 98 °F (36.7 °C) (12/29/23 1151)  Pulse: 73 (12/29/23 1455)  Resp: 18 (12/29/23 1151)  BP: (!) 90/51 (12/29/23 1151)  SpO2: (!) 90 % (12/29/23 1151) Vital Signs (24h Range):  Temp:  [98 °F (36.7 °C)-98.3 °F (36.8 °C)] 98 °F (36.7 °C)  Pulse:  [73-99] 73  Resp:  [16-18] 18  SpO2:  [87 %-96 %] 90 %  BP: ()/(50-66) 90/51     Weight: 95.4 kg (210 lb 5.1 oz)  Body mass index is 28.52 kg/m².    Intake/Output Summary (Last 24 hours) at 12/29/2023 1531  Last data filed at 12/29/2023 1452  Gross per 24 hour   Intake --   Output 1025 ml   Net -1025 ml           Physical Exam  Vitals and nursing note reviewed.   Constitutional:       General: He is not in acute distress.     Appearance: He is well-developed. He is not toxic-appearing or diaphoretic.      Interventions: Nasal cannula in place.   Pulmonary:      Effort: Pulmonary effort is normal. No respiratory distress.   Skin:     General: Skin is warm and dry.      Coloration: Skin is not jaundiced or pale.   Neurological:      Mental Status: He is alert and oriented to person, place, and time. Mental status is at baseline.      Motor: No seizure activity.   Psychiatric:         Attention and Perception: Attention normal.         Mood and Affect: Mood and affect normal.         Behavior: Behavior is cooperative.             Significant Labs: All pertinent labs within the past 24 hours have been reviewed.    Significant Imaging: I have reviewed all pertinent imaging results/findings within the past 24 hours.  CT Renal Stone Study Abd Pelvis WO 12/28/23: FINDINGS:   CHEST:    Lungs/Pleura: Bibasilar subsegmental atelectasis versus scarring.   No focal consolidation, pneumothorax, or pleural effusion is present.   Heart: The visualized portions of the heart are normal. No significant pericardial effusion.  Dense multi-vessel coronary atherosclerotic plaque.   Thoracic soft tissues: Unremarkable   ABDOMEN:   Liver: Postoperative changes of liver transplantation.  No focal hepatic abnormality.   Gallbladder/Bile ducts: Gallbladder surgically absent.  No extrahepatic biliary ductal dilatation.   Spleen:Mildly enlarged, measuring 13.6 cm.  Perisplenic collateral vessels present.  Punctate calcified granuloma.   Stomach: Unremarkable.   Pancreas: Few calcifications, which may indicate prior pancreatitis.  No ductal dilatation, mass, or peripancreatic fat stranding.   Adrenals: Stable 1.2 cm left adrenal nodule.   Renal/Ureters: The kidneys are normal in size and location. No hydronephrosis. Stable bilateral cysts, the largest measuring 1.8 cm on the right and 3.4 cm on the left.  Few nonobstructive nephroliths bilaterally.  No evidence of ureteral dilatation or obstruction.  No ureterolithiasis.  Moderate plaque burden in the bilateral renal arteries.  The ureters are normal in course and caliber. The urinary bladder is unremarkable.   Reproductive: Dystrophic prostatic calcifications.   Bowel: The visualized loops of small and large bowel show no evidence of obstruction or inflammation.  Appendix is not visualized, appendectomy June 2023 per chart review.   Peritoneum: Small amount of pelvic and perihepatic free fluid.  No intraperitoneal free air.   Lymph Nodes: No pathologic bird enlargement in the abdomen or pelvis.   Vasculature: The abdominal aorta is normal in course and caliber.  Dense aortoiliac atherosclerotic calcifications.   Bones: Degenerative changes of the spine.  No acute fractures or osseous destructive lesions.  Soft Tissues: the extraperitoneal soft tissues are  unremarkable.   X-Ray Chest AP Portable 12/29/23: FINDINGS:   Mild basilar reticular opacities suggesting either scarring or mild atelectasis.  No new focal consolidation, large effusion or pneumothorax.  No radiographic evidence of significant cardiac decompensation.  Cardiac silhouette is stable in size.  There are aortic calcifications.  Osseous structures show no acute abnormalities.   Impression:  No acute radiographic findings on single view of the chest.  No significant detrimental changes from prior.

## 2023-12-29 NOTE — NURSING
Patient is awake and oriented x 4. Complained  of dizziness. BP 87/50, manually rechecked 89/60 in a sitting position. 500 ml fluid bolus ordered by Ryanne RODRIGUEZ. BP rechecked manualy 90/60 lying position.   Desaturation 88-90%. O2 inhalation at 2 LPM via nasal cannula started. Midodrine given . CXR done at bedside.  Urinal at bedside. Bowel movement x 1.

## 2023-12-29 NOTE — PROGRESS NOTES
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 He lives in [place], [place]. He is . His hepatologist is Dr. Thais Maxwell.               He presented to [facility] Emergency Department on 12/22/2023 with fatigue and progressive dyspnea over the last 3 days. He had dyspnea with minimal exertion (walking 2 to 3 feet) and orthopnea. He could not slep on his side and had been getting up and sitting in his recliner. He had difficulty sleeping resulting in fatigue from insomnia.              Chest X-ray showed patchy lung opacities. BNP was 539 pg/mL. He was given 40 mg of intravenous furosemide. He urinated 900 mL while in the emergency department. He was admitted to Hospital Medicine Team D.      Overview/Hospital Course:  Echocardiogram showed severely reduced ejection fraction of 20 to 25%, moderate to severe low gradient aortic stenosis. Cardiology was consulted. He had Q waves on EKG. There was concern that he had a myocardial infarction.  Interventional Cardiology was consulted. He was started on aspirin, a statin, and a beta-blocker. He underwent left heart catheterization via right radial artery showing 90% stenosis in the lateral circumflex artery. A stent was placed. He was started on dual antiplatelet therapy and isosorbide mononitrate. On 12/27/2023, he started having dizziness after taking his morning medications. Blood pressures were as low as 90s/50s while supine. The same happened the next morning, even without furosemide. Isosorbide mononitrate was stopped. He was orthostatic. Isosorbide mononitrate was stopped. He also developed new right lower quadrant abdominal pain that felt similar to prior nephrolithiasis. CT showed bilateral nephrolithiasis without evidence of obstruction. He continued to feel lightheaded. He also had shortness of breath and became hypoxic. Repeat chest X-ray showed nothing new. BNP was higher at 743 pg/mL.     Interval History: Having new hypoxia. Still lightheaded. Kidney stone pain resolved.    Review of Systems   Constitutional:  Negative for chills and fever.   Respiratory:  Positive for shortness of breath.    Gastrointestinal:  Negative for abdominal pain and vomiting.   Neurological:  Positive for dizziness and light-headedness. Negative for syncope.     Objective:     Vital Signs (Most Recent):  Temp: 98 °F (36.7 °C) (12/29/23 1151)  Pulse: 73 (12/29/23 1455)  Resp: 18 (12/29/23 1151)  BP: (!) 90/51 (12/29/23 1151)  SpO2: (!) 90 % (12/29/23 1151) Vital Signs (24h Range):  Temp:  [98 °F (36.7 °C)-98.3 °F (36.8 °C)] 98 °F (36.7 °C)  Pulse:  [73-99] 73  Resp:  [16-18] 18  SpO2:  [87 %-96 %] 90 %  BP: ()/(50-66) 90/51     Weight: 95.4 kg (210 lb 5.1 oz)  Body mass index is 28.52 kg/m².    Intake/Output Summary (Last 24 hours) at 12/29/2023 1531  Last data filed at 12/29/2023 1452  Gross per 24 hour   Intake --   Output 1025 ml   Net -1025 ml           Physical Exam  Vitals and nursing note reviewed.    Constitutional:       General: He is not in acute distress.     Appearance: He is well-developed. He is not toxic-appearing or diaphoretic.      Interventions: Nasal cannula in place.   Pulmonary:      Effort: Pulmonary effort is normal. No respiratory distress.   Skin:     General: Skin is warm and dry.      Coloration: Skin is not jaundiced or pale.   Neurological:      Mental Status: He is alert and oriented to person, place, and time. Mental status is at baseline.      Motor: No seizure activity.   Psychiatric:         Attention and Perception: Attention normal.         Mood and Affect: Mood and affect normal.         Behavior: Behavior is cooperative.             Significant Labs: All pertinent labs within the past 24 hours have been reviewed.    Significant Imaging: I have reviewed all pertinent imaging results/findings within the past 24 hours.  CT Renal Stone Study Abd Pelvis WO 12/28/23: FINDINGS:   CHEST:   Lungs/Pleura: Bibasilar subsegmental atelectasis versus scarring.   No focal consolidation, pneumothorax, or pleural effusion is present.   Heart: The visualized portions of the heart are normal. No significant pericardial effusion.  Dense multi-vessel coronary atherosclerotic plaque.   Thoracic soft tissues: Unremarkable   ABDOMEN:   Liver: Postoperative changes of liver transplantation.  No focal hepatic abnormality.   Gallbladder/Bile ducts: Gallbladder surgically absent.  No extrahepatic biliary ductal dilatation.   Spleen:Mildly enlarged, measuring 13.6 cm.  Perisplenic collateral vessels present.  Punctate calcified granuloma.   Stomach: Unremarkable.   Pancreas: Few calcifications, which may indicate prior pancreatitis.  No ductal dilatation, mass, or peripancreatic fat stranding.   Adrenals: Stable 1.2 cm left adrenal nodule.   Renal/Ureters: The kidneys are normal in size and location. No hydronephrosis. Stable bilateral cysts, the largest measuring 1.8 cm on the right and 3.4 cm on the left.   Few nonobstructive nephroliths bilaterally.  No evidence of ureteral dilatation or obstruction.  No ureterolithiasis.  Moderate plaque burden in the bilateral renal arteries.  The ureters are normal in course and caliber. The urinary bladder is unremarkable.   Reproductive: Dystrophic prostatic calcifications.   Bowel: The visualized loops of small and large bowel show no evidence of obstruction or inflammation.  Appendix is not visualized, appendectomy June 2023 per chart review.   Peritoneum: Small amount of pelvic and perihepatic free fluid.  No intraperitoneal free air.   Lymph Nodes: No pathologic bird enlargement in the abdomen or pelvis.   Vasculature: The abdominal aorta is normal in course and caliber.  Dense aortoiliac atherosclerotic calcifications.   Bones: Degenerative changes of the spine.  No acute fractures or osseous destructive lesions.  Soft Tissues: the extraperitoneal soft tissues are unremarkable.   X-Ray Chest AP Portable 12/29/23: FINDINGS:   Mild basilar reticular opacities suggesting either scarring or mild atelectasis.  No new focal consolidation, large effusion or pneumothorax.  No radiographic evidence of significant cardiac decompensation.  Cardiac silhouette is stable in size.  There are aortic calcifications.  Osseous structures show no acute abnormalities.   Impression:  No acute radiographic findings on single view of the chest.  No significant detrimental changes from prior.     Assessment/Plan:      Hypoxia  Chest X-ray showed nothing new. Check D-dimer.    Nephrolithiasis  Give hydrocodone-acetaminophen prn. Get CT renal stone study.    Orthostatic dizziness  Stopped isosorbide mononitrate. Persistent. Start midodrine.    Unstable angina  Coronary artery disease involving native coronary artery of native heart without angina pectoris   Presence of drug-eluting stent in left circumflex coronary artery   Stent placed. Started aspirin, clopidrogel, metoprolol, isosorbide  mononitrate. Stop isosorbide mononitrate due to orthostatic hypotension.     HFrEF (heart failure with reduced ejection fraction)  Diuresed.    Nonrheumatic aortic valve stenosis  Echo showed severe reduced EF of 20-25%, moderate to severe low gradient aortic stenosis.      Anemia due to unknown mechanism  Patient's anemia is currently uncontrolled. Has not received any PRBCs to date. Etiology likely d/t Iron deficiency  Current CBC reviewed-   Lab Results   Component Value Date    HGB 8.6 (L) 12/25/2023    HCT 30.9 (L) 12/25/2023     Monitor serial CBC and transfuse if patient becomes hemodynamically unstable, symptomatic or H/H drops below 7/21.    S/P liver transplant  Continue home tacrolimus.    Persistent atrial fibrillation  Continue home amiodarone, apixaban. Started metoprolol.    Type 2 diabetes mellitus, with long-term current use of insulin  He takes insulin glargine 22 units HS, insulin lispro 6 units TID with maels at home. Giving insulin detemir 8 units BID, insulin aspart 4 units TID with meals, sliding scale. Monitor Accuchecks.    Primary hypertension  Home amlodipine changed to metoprolol. Monitor blood pressures.      VTE Risk Mitigation (From admission, onward)           Ordered     apixaban tablet 5 mg  2 times daily         12/26/23 1202     Reason for No Pharmacological VTE Prophylaxis  Once        Question:  Reasons:  Answer:  Already adequately anticoagulated on oral Anticoagulants    12/23/23 0020     IP VTE HIGH RISK PATIENT  Once         12/23/23 0020     Place sequential compression device  Until discontinued         12/23/23 0020                    Discharge Planning   FELIHSA: 12/31/2023     Code Status: Full Code   Is the patient medically ready for discharge?: No    Reason for patient still in hospital (select all that apply): Patient new problem, Patient trending condition, and Treatment  Discharge Plan A: Home                  Sae Pearl MD  Department of Hospital Medicine    [facility] - Intensive Care ([place]

## 2023-12-30 LAB
ALBUMIN SERPL BCP-MCNC: 3.6 G/DL (ref 3.5–5.2)
ALBUMIN SERPL BCP-MCNC: 3.6 G/DL (ref 3.5–5.2)
ALP SERPL-CCNC: 72 U/L (ref 55–135)
ALP SERPL-CCNC: 72 U/L (ref 55–135)
ALT SERPL W/O P-5'-P-CCNC: 11 U/L (ref 10–44)
ALT SERPL W/O P-5'-P-CCNC: 11 U/L (ref 10–44)
ANION GAP SERPL CALC-SCNC: 8 MMOL/L (ref 8–16)
AST SERPL-CCNC: 17 U/L (ref 10–40)
AST SERPL-CCNC: 17 U/L (ref 10–40)
BASOPHILS # BLD AUTO: 0.07 K/UL (ref 0–0.2)
BASOPHILS NFR BLD: 0.9 % (ref 0–1.9)
BILIRUB DIRECT SERPL-MCNC: 0.3 MG/DL (ref 0.1–0.3)
BILIRUB SERPL-MCNC: 0.8 MG/DL (ref 0.1–1)
BILIRUB SERPL-MCNC: 0.8 MG/DL (ref 0.1–1)
BUN SERPL-MCNC: 24 MG/DL (ref 8–23)
CALCIUM SERPL-MCNC: 8.8 MG/DL (ref 8.7–10.5)
CHLORIDE SERPL-SCNC: 101 MMOL/L (ref 95–110)
CO2 SERPL-SCNC: 27 MMOL/L (ref 23–29)
CREAT SERPL-MCNC: 1.3 MG/DL (ref 0.5–1.4)
DIFFERENTIAL METHOD BLD: ABNORMAL
EOSINOPHIL # BLD AUTO: 0.2 K/UL (ref 0–0.5)
EOSINOPHIL NFR BLD: 2.2 % (ref 0–8)
ERYTHROCYTE [DISTWIDTH] IN BLOOD BY AUTOMATED COUNT: 19.6 % (ref 11.5–14.5)
EST. GFR  (NO RACE VARIABLE): 59.1 ML/MIN/1.73 M^2
GLUCOSE SERPL-MCNC: 129 MG/DL (ref 70–110)
HCT VFR BLD AUTO: 31.1 % (ref 40–54)
HGB BLD-MCNC: 8.9 G/DL (ref 14–18)
IMM GRANULOCYTES # BLD AUTO: 0.04 K/UL (ref 0–0.04)
IMM GRANULOCYTES NFR BLD AUTO: 0.5 % (ref 0–0.5)
LYMPHOCYTES # BLD AUTO: 1.1 K/UL (ref 1–4.8)
LYMPHOCYTES NFR BLD: 13.7 % (ref 18–48)
MCH RBC QN AUTO: 19.1 PG (ref 27–31)
MCHC RBC AUTO-ENTMCNC: 28.6 G/DL (ref 32–36)
MCV RBC AUTO: 67 FL (ref 82–98)
MONOCYTES # BLD AUTO: 1 K/UL (ref 0.3–1)
MONOCYTES NFR BLD: 12.7 % (ref 4–15)
NEUTROPHILS # BLD AUTO: 5.5 K/UL (ref 1.8–7.7)
NEUTROPHILS NFR BLD: 70 % (ref 38–73)
NRBC BLD-RTO: 0 /100 WBC
PLATELET # BLD AUTO: 171 K/UL (ref 150–450)
PMV BLD AUTO: ABNORMAL FL (ref 9.2–12.9)
POCT GLUCOSE: 158 MG/DL (ref 70–110)
POCT GLUCOSE: 187 MG/DL (ref 70–110)
POCT GLUCOSE: 208 MG/DL (ref 70–110)
POCT GLUCOSE: 210 MG/DL (ref 70–110)
POCT GLUCOSE: 228 MG/DL (ref 70–110)
POTASSIUM SERPL-SCNC: 4.1 MMOL/L (ref 3.5–5.1)
PROT SERPL-MCNC: 6.8 G/DL (ref 6–8.4)
PROT SERPL-MCNC: 6.8 G/DL (ref 6–8.4)
RBC # BLD AUTO: 4.66 M/UL (ref 4.6–6.2)
SODIUM SERPL-SCNC: 136 MMOL/L (ref 136–145)
TACROLIMUS BLD-MCNC: 4.1 NG/ML (ref 5–15)
WBC # BLD AUTO: 7.9 K/UL (ref 3.9–12.7)

## 2023-12-30 PROCEDURE — 27000221 HC OXYGEN, UP TO 24 HOURS

## 2023-12-30 PROCEDURE — 20000000 HC ICU ROOM

## 2023-12-30 PROCEDURE — 99900035 HC TECH TIME PER 15 MIN (STAT)

## 2023-12-30 PROCEDURE — 25000003 PHARM REV CODE 250: Performed by: STUDENT IN AN ORGANIZED HEALTH CARE EDUCATION/TRAINING PROGRAM

## 2023-12-30 PROCEDURE — 94761 N-INVAS EAR/PLS OXIMETRY MLT: CPT

## 2023-12-30 PROCEDURE — 80197 ASSAY OF TACROLIMUS: CPT | Performed by: STUDENT IN AN ORGANIZED HEALTH CARE EDUCATION/TRAINING PROGRAM

## 2023-12-30 PROCEDURE — 85025 COMPLETE CBC W/AUTO DIFF WBC: CPT | Performed by: STUDENT IN AN ORGANIZED HEALTH CARE EDUCATION/TRAINING PROGRAM

## 2023-12-30 PROCEDURE — 80076 HEPATIC FUNCTION PANEL: CPT | Performed by: INTERNAL MEDICINE

## 2023-12-30 PROCEDURE — 94640 AIRWAY INHALATION TREATMENT: CPT

## 2023-12-30 PROCEDURE — 25000242 PHARM REV CODE 250 ALT 637 W/ HCPCS: Performed by: INTERNAL MEDICINE

## 2023-12-30 PROCEDURE — 80053 COMPREHEN METABOLIC PANEL: CPT | Performed by: STUDENT IN AN ORGANIZED HEALTH CARE EDUCATION/TRAINING PROGRAM

## 2023-12-30 PROCEDURE — 25500020 PHARM REV CODE 255: Performed by: HOSPITALIST

## 2023-12-30 PROCEDURE — 63600175 PHARM REV CODE 636 W HCPCS: Performed by: HOSPITALIST

## 2023-12-30 PROCEDURE — 21400001 HC TELEMETRY ROOM

## 2023-12-30 PROCEDURE — 25000003 PHARM REV CODE 250: Performed by: INTERNAL MEDICINE

## 2023-12-30 PROCEDURE — 25000003 PHARM REV CODE 250: Performed by: HOSPITALIST

## 2023-12-30 PROCEDURE — 36415 COLL VENOUS BLD VENIPUNCTURE: CPT | Performed by: INTERNAL MEDICINE

## 2023-12-30 PROCEDURE — 63600175 PHARM REV CODE 636 W HCPCS: Performed by: STUDENT IN AN ORGANIZED HEALTH CARE EDUCATION/TRAINING PROGRAM

## 2023-12-30 RX ORDER — HYDROXYZINE HYDROCHLORIDE 25 MG/1
25 TABLET, FILM COATED ORAL 3 TIMES DAILY PRN
Status: DISCONTINUED | OUTPATIENT
Start: 2023-12-30 | End: 2024-01-02 | Stop reason: HOSPADM

## 2023-12-30 RX ORDER — MIDODRINE HYDROCHLORIDE 5 MG/1
10 TABLET ORAL
Status: DISCONTINUED | OUTPATIENT
Start: 2023-12-30 | End: 2024-01-02 | Stop reason: HOSPADM

## 2023-12-30 RX ORDER — IPRATROPIUM BROMIDE AND ALBUTEROL SULFATE 2.5; .5 MG/3ML; MG/3ML
3 SOLUTION RESPIRATORY (INHALATION) EVERY 6 HOURS PRN
Status: DISCONTINUED | OUTPATIENT
Start: 2023-12-30 | End: 2024-01-02 | Stop reason: HOSPADM

## 2023-12-30 RX ORDER — FUROSEMIDE 10 MG/ML
40 INJECTION INTRAMUSCULAR; INTRAVENOUS DAILY
Status: DISCONTINUED | OUTPATIENT
Start: 2023-12-30 | End: 2024-01-02 | Stop reason: HOSPADM

## 2023-12-30 RX ADMIN — IOHEXOL 100 ML: 350 INJECTION, SOLUTION INTRAVENOUS at 12:12

## 2023-12-30 RX ADMIN — SERTRALINE HYDROCHLORIDE 25 MG: 25 TABLET ORAL at 09:12

## 2023-12-30 RX ADMIN — FUROSEMIDE 40 MG: 10 INJECTION, SOLUTION INTRAVENOUS at 06:12

## 2023-12-30 RX ADMIN — Medication 800 MG: at 09:12

## 2023-12-30 RX ADMIN — INSULIN DETEMIR 8 UNITS: 100 INJECTION, SOLUTION SUBCUTANEOUS at 09:12

## 2023-12-30 RX ADMIN — ASPIRIN 81 MG: 81 TABLET, COATED ORAL at 09:12

## 2023-12-30 RX ADMIN — MIDODRINE HYDROCHLORIDE 5 MG: 5 TABLET ORAL at 09:12

## 2023-12-30 RX ADMIN — IPRATROPIUM BROMIDE AND ALBUTEROL SULFATE 3 ML: .5; 3 SOLUTION RESPIRATORY (INHALATION) at 05:12

## 2023-12-30 RX ADMIN — INSULIN ASPART 4 UNITS: 100 INJECTION, SOLUTION INTRAVENOUS; SUBCUTANEOUS at 06:12

## 2023-12-30 RX ADMIN — INSULIN ASPART 2 UNITS: 100 INJECTION, SOLUTION INTRAVENOUS; SUBCUTANEOUS at 09:12

## 2023-12-30 RX ADMIN — APIXABAN 5 MG: 5 TABLET, FILM COATED ORAL at 08:12

## 2023-12-30 RX ADMIN — BUPROPION HYDROCHLORIDE 150 MG: 150 TABLET, FILM COATED, EXTENDED RELEASE ORAL at 06:12

## 2023-12-30 RX ADMIN — INSULIN ASPART 1 UNITS: 100 INJECTION, SOLUTION INTRAVENOUS; SUBCUTANEOUS at 08:12

## 2023-12-30 RX ADMIN — QUETIAPINE FUMARATE 100 MG: 25 TABLET ORAL at 08:12

## 2023-12-30 RX ADMIN — INSULIN ASPART 4 UNITS: 100 INJECTION, SOLUTION INTRAVENOUS; SUBCUTANEOUS at 12:12

## 2023-12-30 RX ADMIN — Medication 1 CAPSULE: at 09:12

## 2023-12-30 RX ADMIN — ATORVASTATIN CALCIUM 80 MG: 40 TABLET, FILM COATED ORAL at 09:12

## 2023-12-30 RX ADMIN — APIXABAN 5 MG: 5 TABLET, FILM COATED ORAL at 09:12

## 2023-12-30 RX ADMIN — TACROLIMUS 0.5 MG: 0.5 CAPSULE ORAL at 09:12

## 2023-12-30 RX ADMIN — HYDROXYZINE HYDROCHLORIDE 25 MG: 25 TABLET, FILM COATED ORAL at 04:12

## 2023-12-30 RX ADMIN — INSULIN DETEMIR 8 UNITS: 100 INJECTION, SOLUTION SUBCUTANEOUS at 08:12

## 2023-12-30 RX ADMIN — CLOPIDOGREL BISULFATE 75 MG: 75 TABLET ORAL at 09:12

## 2023-12-30 RX ADMIN — THERA TABS 1 TABLET: TAB at 09:12

## 2023-12-30 RX ADMIN — PANTOPRAZOLE SODIUM 40 MG: 40 TABLET, DELAYED RELEASE ORAL at 09:12

## 2023-12-30 RX ADMIN — TACROLIMUS 0.5 MG: 0.5 CAPSULE ORAL at 06:12

## 2023-12-30 RX ADMIN — INSULIN ASPART 4 UNITS: 100 INJECTION, SOLUTION INTRAVENOUS; SUBCUTANEOUS at 09:12

## 2023-12-30 RX ADMIN — MIDODRINE HYDROCHLORIDE 10 MG: 5 TABLET ORAL at 06:12

## 2023-12-30 RX ADMIN — Medication 800 MG: at 06:12

## 2023-12-30 NOTE — PROGRESS NOTES
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 He lives in [place], [place]. He is . His hepatologist is Dr. Thais Maxwell.               He presented to [facility] Emergency Department on 12/22/2023 with fatigue and progressive dyspnea over the last 3 days. He had dyspnea with minimal exertion (walking 2 to 3 feet) and orthopnea. He could not slep on his side and had been getting up and sitting in his recliner. He had difficulty sleeping resulting in fatigue from insomnia.              Chest X-ray showed patchy lung opacities. BNP was 539 pg/mL. He was given 40 mg of intravenous furosemide. He urinated 900 mL while in the emergency department. He was admitted to Hospital Medicine Team D.      Overview/Hospital Course:  Echocardiogram showed severely reduced ejection fraction of 20 to 25%, moderate to severe low gradient aortic stenosis. Cardiology was consulted. He had Q waves on EKG. There was concern that he had a myocardial infarction.  Interventional Cardiology was consulted. He was started on aspirin, a statin, and a beta-blocker. He underwent left heart catheterization via right radial artery showing 90% stenosis in the lateral circumflex artery. A stent was placed. He was started on dual antiplatelet therapy and isosorbide mononitrate. On 12/27/2023, he started having dizziness after taking his morning medications. Blood pressures were as low as 90s/50s while supine. The same happened the next morning, even without furosemide. Isosorbide mononitrate was stopped. He was orthostatic. Isosorbide mononitrate was stopped. He was given 250 mL of normal saline. He also developed new right lower quadrant abdominal pain that felt similar to prior nephrolithiasis. CT showed bilateral nephrolithiasis without evidence of obstruction. He continued to feel lightheaded with low blood pressures when standing. He was given 500 mL of normal saline. He also had shortness of breath and became hypoxic. Repeat chest X-ray showed nothing new. BNP was higher at 743 pg/mL, but his net fluid status was negative as he had produced more urine in relation to the small amounts of intravenous fluids he was given. D-dimer was elevated at 1.86 mg/L. Chest CTA showed no pulmonary embolism, instead showing interstitial pulmonary edema and trace bilateral pleural effusions and enlarged main pulmonary artery suggesting pulmonary hypertension. Metoprolol was also held. He was put on midodrine, which improved supine blood pressure to 140/83, with standing blood pressure of 105/55. He still felt lightheadedness. He was still hypoxic.    Interval History: Still has pulmonary edema and hypoxia but still orthostatic and lightheaded.    Review of Systems   Constitutional:  Negative for chills and fever.   Respiratory:  Positive for shortness of breath.    Gastrointestinal:  Negative for abdominal pain and vomiting.   Neurological:  Positive for light-headedness. Negative for syncope.      Objective:     Vital Signs (Most Recent):  Temp: 97.5 °F (36.4 °C) (12/30/23 1245)  Pulse: 80 (12/30/23 1245)  Resp: 17 (12/30/23 1245)  BP: (!) 105/55 (12/30/23 1307)  SpO2: 97 % (12/30/23 1302) Vital Signs (24h Range):  Temp:  [97.2 °F (36.2 °C)-98.2 °F (36.8 °C)] 97.5 °F (36.4 °C)  Pulse:  [71-99] 80  Resp:  [17-19] 17  SpO2:  [93 %-100 %] 97 %  BP: ()/(50-83) 105/55     Weight: 95.4 kg (210 lb 5.1 oz)  Body mass index is 28.52 kg/m².    Intake/Output Summary (Last 24 hours) at 12/30/2023 1402  Last data filed at 12/30/2023 0746  Gross per 24 hour   Intake --   Output 1925 ml   Net -1925 ml           Physical Exam  Vitals and nursing note reviewed.   Constitutional:       General: He is not in acute distress.     Appearance: He is well-developed. He is not toxic-appearing or diaphoretic.      Interventions: Nasal cannula in place.   Pulmonary:      Effort: Pulmonary effort is normal. No respiratory distress.   Skin:     General: Skin is warm and dry.      Coloration: Skin is not jaundiced or pale.   Neurological:      Mental Status: He is alert and oriented to person, place, and time. Mental status is at baseline.      Motor: No seizure activity.   Psychiatric:         Attention and Perception: Attention normal.         Mood and Affect: Mood and affect normal.         Behavior: Behavior is cooperative.             Significant Labs: All pertinent labs within the past 24 hours have been reviewed.    Significant Imaging: I have reviewed all pertinent imaging results/findings within the past 24 hours.  CTA Chest Non-Coronary (PE Studies) 12/30/23: FINDINGS:   LINES/TUBES: None.   SOFT TISSUES: No axillary or subpectoral lymphadenopathy. The visualized thyroid gland is unremarkable.   HEART AND MEDIASTINUM: No mediastinal or hilar lymphadenopathy. Heart and pericardium are within normal limits. Calcifications of the aortic valve, coronary arteries, thoracic aorta, and arch vessels.  The main pulmonary artery  is enlarged measuring 3.3 cm.  No filling defects in the pulmonary arteries to suggest thromboembolism.   PLEURA: Trace pleural effusions bilaterally.  No pneumothorax.   LUNGS AND AIRWAYS: Airways are patent.  Diffuse bronchial wall thickening.  Subsegmental atelectasis in the dependent lungs adjacent to the pleural effusions.  No focal consolidation.  There is smooth interlobular septal thickening throughout both lungs.   UPPER ABDOMEN: Postoperative changes from liver transplant.  The gallbladder is surgically absent.  Splenomegaly measuring 13.9 cm.  Small calcified splenic granuloma.  Parenchymal calcifications in the pancreas suggesting chronic pancreatitis.  Bilateral renal cysts.  Trace perihepatic ascites.   BONES: Remote fractures involving the right humerus and multiple right-sided ribs.  There is a nondisplaced fracture involving the right anterolateral 6th rib (2:344), which is new from 09/18/2023 but demonstrates callus formation suggesting subacute injury.  No aggressive osseous lesion.   Impression:  No pulmonary thromboembolism.   Interstitial pulmonary edema and trace bilateral pleural effusions.   Enlarged main pulmonary artery suggesting pulmonary hypertension.   Other findings as described.     Assessment/Plan:      Hypoxia  Pulmonary embolism ruled out by chest CTA, which showed residual fluid. Resuming diuresis, however, may exacerbate orthostatic hypotension.     Nephrolithiasis  Give hydrocodone-acetaminophen prn. Get CT renal stone study.    Orthostatic dizziness  Stopped home amlodipine. Stopped isosorbide mononitrate and metoprolol, which were new. Persistent. Started midodrine. Persistent. Increase midodrine dose.    Unstable angina  Coronary artery disease involving native coronary artery of native heart without angina pectoris   Presence of drug-eluting stent in left circumflex coronary artery   Stent placed. Started aspirin, clopidrogel, metoprolol, isosorbide mononitrate. Stop  isosorbide mononitrate due to orthostatic hypotension.     HFrEF (heart failure with reduced ejection fraction)  Diuresed but still has residual fluid. Diuresing further may exacerbate orthostatic hypotension. Consult Cardiology for recommendations.    Nonrheumatic aortic valve stenosis  Echo showed severe reduced EF of 20-25%, moderate to severe low gradient aortic stenosis.      Anemia due to unknown mechanism  Patient's anemia is currently uncontrolled. Has not received any PRBCs to date. Etiology likely d/t Iron deficiency  Current CBC reviewed-   Lab Results   Component Value Date    HGB 8.6 (L) 12/25/2023    HCT 30.9 (L) 12/25/2023     Monitor serial CBC and transfuse if patient becomes hemodynamically unstable, symptomatic or H/H drops below 7/21.    S/P liver transplant  Continue home tacrolimus.    Persistent atrial fibrillation  Continue home amiodarone, apixaban. Started metoprolol.    Type 2 diabetes mellitus, with long-term current use of insulin  He takes insulin glargine 22 units HS, insulin lispro 6 units TID with maels at home. Giving insulin detemir 8 units BID, insulin aspart 4 units TID with meals, sliding scale. Monitor Accuchecks.    Primary hypertension  Home amlodipine changed to metoprolol but metoprolol held due to orthostatic hypotension. Monitor blood pressures.      VTE Risk Mitigation (From admission, onward)           Ordered     apixaban tablet 5 mg  2 times daily         12/26/23 1202     Reason for No Pharmacological VTE Prophylaxis  Once        Question:  Reasons:  Answer:  Already adequately anticoagulated on oral Anticoagulants    12/23/23 0020     IP VTE HIGH RISK PATIENT  Once         12/23/23 0020     Place sequential compression device  Until discontinued         12/23/23 0020                    Discharge Planning   FELISHA: 12/31/2023     Code Status: Full Code   Is the patient medically ready for discharge?: No    Reason for patient still in hospital (select all that apply):  Patient unstable, Patient trending condition, and Treatment  Discharge Plan A: Home                  Sae Pearl MD  Department of Hospital Medicine   [facility] - Intensive Care ([place]

## 2023-12-30 NOTE — ASSESSMENT & PLAN NOTE
Diuresed but still has residual fluid. Diuresing further may exacerbate orthostatic hypotension. Consult Cardiology for recommendations.

## 2023-12-30 NOTE — ASSESSMENT & PLAN NOTE
Stopped home amlodipine. Stopped isosorbide mononitrate and metoprolol, which were new. Persistent. Started midodrine. Persistent. Increase midodrine dose.

## 2023-12-30 NOTE — SUBJECTIVE & OBJECTIVE
Interval History: Still has pulmonary edema and hypoxia but still orthostatic and lightheaded.    Review of Systems   Constitutional:  Negative for chills and fever.   Respiratory:  Positive for shortness of breath.    Gastrointestinal:  Negative for abdominal pain and vomiting.   Neurological:  Positive for light-headedness. Negative for syncope.     Objective:     Vital Signs (Most Recent):  Temp: 97.5 °F (36.4 °C) (12/30/23 1245)  Pulse: 80 (12/30/23 1245)  Resp: 17 (12/30/23 1245)  BP: (!) 105/55 (12/30/23 1307)  SpO2: 97 % (12/30/23 1302) Vital Signs (24h Range):  Temp:  [97.2 °F (36.2 °C)-98.2 °F (36.8 °C)] 97.5 °F (36.4 °C)  Pulse:  [71-99] 80  Resp:  [17-19] 17  SpO2:  [93 %-100 %] 97 %  BP: ()/(50-83) 105/55     Weight: 95.4 kg (210 lb 5.1 oz)  Body mass index is 28.52 kg/m².    Intake/Output Summary (Last 24 hours) at 12/30/2023 1402  Last data filed at 12/30/2023 0746  Gross per 24 hour   Intake --   Output 1925 ml   Net -1925 ml           Physical Exam  Vitals and nursing note reviewed.   Constitutional:       General: He is not in acute distress.     Appearance: He is well-developed. He is not toxic-appearing or diaphoretic.      Interventions: Nasal cannula in place.   Pulmonary:      Effort: Pulmonary effort is normal. No respiratory distress.   Skin:     General: Skin is warm and dry.      Coloration: Skin is not jaundiced or pale.   Neurological:      Mental Status: He is alert and oriented to person, place, and time. Mental status is at baseline.      Motor: No seizure activity.   Psychiatric:         Attention and Perception: Attention normal.         Mood and Affect: Mood and affect normal.         Behavior: Behavior is cooperative.             Significant Labs: All pertinent labs within the past 24 hours have been reviewed.    Significant Imaging: I have reviewed all pertinent imaging results/findings within the past 24 hours.  CTA Chest Non-Coronary (PE Studies) 12/30/23: FINDINGS:    LINES/TUBES: None.   SOFT TISSUES: No axillary or subpectoral lymphadenopathy. The visualized thyroid gland is unremarkable.   HEART AND MEDIASTINUM: No mediastinal or hilar lymphadenopathy. Heart and pericardium are within normal limits. Calcifications of the aortic valve, coronary arteries, thoracic aorta, and arch vessels.  The main pulmonary artery is enlarged measuring 3.3 cm.  No filling defects in the pulmonary arteries to suggest thromboembolism.   PLEURA: Trace pleural effusions bilaterally.  No pneumothorax.   LUNGS AND AIRWAYS: Airways are patent.  Diffuse bronchial wall thickening.  Subsegmental atelectasis in the dependent lungs adjacent to the pleural effusions.  No focal consolidation.  There is smooth interlobular septal thickening throughout both lungs.   UPPER ABDOMEN: Postoperative changes from liver transplant.  The gallbladder is surgically absent.  Splenomegaly measuring 13.9 cm.  Small calcified splenic granuloma.  Parenchymal calcifications in the pancreas suggesting chronic pancreatitis.  Bilateral renal cysts.  Trace perihepatic ascites.   BONES: Remote fractures involving the right humerus and multiple right-sided ribs.  There is a nondisplaced fracture involving the right anterolateral 6th rib (2:344), which is new from 09/18/2023 but demonstrates callus formation suggesting subacute injury.  No aggressive osseous lesion.   Impression:  No pulmonary thromboembolism.   Interstitial pulmonary edema and trace bilateral pleural effusions.   Enlarged main pulmonary artery suggesting pulmonary hypertension.   Other findings as described.

## 2023-12-30 NOTE — ASSESSMENT & PLAN NOTE
Pulmonary embolism ruled out by chest CTA, which showed residual fluid. Resuming diuresis, however, may exacerbate orthostatic hypotension.

## 2023-12-30 NOTE — ASSESSMENT & PLAN NOTE
Home amlodipine changed to metoprolol but metoprolol held due to orthostatic hypotension. Monitor blood pressures.

## 2023-12-31 PROBLEM — M79.674 PAIN IN TOE OF RIGHT FOOT: Status: ACTIVE | Noted: 2023-12-31

## 2023-12-31 LAB
ALBUMIN SERPL BCP-MCNC: 3.4 G/DL (ref 3.5–5.2)
ALBUMIN SERPL BCP-MCNC: 3.4 G/DL (ref 3.5–5.2)
ALP SERPL-CCNC: 83 U/L (ref 55–135)
ALP SERPL-CCNC: 83 U/L (ref 55–135)
ALT SERPL W/O P-5'-P-CCNC: 9 U/L (ref 10–44)
ALT SERPL W/O P-5'-P-CCNC: 9 U/L (ref 10–44)
ANION GAP SERPL CALC-SCNC: 5 MMOL/L (ref 8–16)
AST SERPL-CCNC: 14 U/L (ref 10–40)
AST SERPL-CCNC: 14 U/L (ref 10–40)
BASOPHILS # BLD AUTO: 0.06 K/UL (ref 0–0.2)
BASOPHILS NFR BLD: 0.9 % (ref 0–1.9)
BILIRUB DIRECT SERPL-MCNC: 0.3 MG/DL (ref 0.1–0.3)
BILIRUB SERPL-MCNC: 0.7 MG/DL (ref 0.1–1)
BILIRUB SERPL-MCNC: 0.7 MG/DL (ref 0.1–1)
BUN SERPL-MCNC: 22 MG/DL (ref 8–23)
CALCIUM SERPL-MCNC: 8.6 MG/DL (ref 8.7–10.5)
CHLORIDE SERPL-SCNC: 100 MMOL/L (ref 95–110)
CO2 SERPL-SCNC: 29 MMOL/L (ref 23–29)
CREAT SERPL-MCNC: 1.3 MG/DL (ref 0.5–1.4)
DIFFERENTIAL METHOD BLD: ABNORMAL
EOSINOPHIL # BLD AUTO: 0.1 K/UL (ref 0–0.5)
EOSINOPHIL NFR BLD: 1.7 % (ref 0–8)
ERYTHROCYTE [DISTWIDTH] IN BLOOD BY AUTOMATED COUNT: 19.1 % (ref 11.5–14.5)
EST. GFR  (NO RACE VARIABLE): 59.1 ML/MIN/1.73 M^2
GLUCOSE SERPL-MCNC: 178 MG/DL (ref 70–110)
HCT VFR BLD AUTO: 31 % (ref 40–54)
HGB BLD-MCNC: 8.4 G/DL (ref 14–18)
IMM GRANULOCYTES # BLD AUTO: 0.05 K/UL (ref 0–0.04)
IMM GRANULOCYTES NFR BLD AUTO: 0.7 % (ref 0–0.5)
LYMPHOCYTES # BLD AUTO: 0.8 K/UL (ref 1–4.8)
LYMPHOCYTES NFR BLD: 11.5 % (ref 18–48)
MCH RBC QN AUTO: 18.9 PG (ref 27–31)
MCHC RBC AUTO-ENTMCNC: 27.1 G/DL (ref 32–36)
MCV RBC AUTO: 70 FL (ref 82–98)
MONOCYTES # BLD AUTO: 0.9 K/UL (ref 0.3–1)
MONOCYTES NFR BLD: 12.7 % (ref 4–15)
NEUTROPHILS # BLD AUTO: 5 K/UL (ref 1.8–7.7)
NEUTROPHILS NFR BLD: 72.5 % (ref 38–73)
NRBC BLD-RTO: 0 /100 WBC
PLATELET # BLD AUTO: 161 K/UL (ref 150–450)
PMV BLD AUTO: 11.3 FL (ref 9.2–12.9)
POCT GLUCOSE: 135 MG/DL (ref 70–110)
POCT GLUCOSE: 150 MG/DL (ref 70–110)
POCT GLUCOSE: 161 MG/DL (ref 70–110)
POCT GLUCOSE: 191 MG/DL (ref 70–110)
POTASSIUM SERPL-SCNC: 4 MMOL/L (ref 3.5–5.1)
PROT SERPL-MCNC: 6.4 G/DL (ref 6–8.4)
PROT SERPL-MCNC: 6.4 G/DL (ref 6–8.4)
RBC # BLD AUTO: 4.44 M/UL (ref 4.6–6.2)
SODIUM SERPL-SCNC: 134 MMOL/L (ref 136–145)
TACROLIMUS BLD-MCNC: 3.3 NG/ML (ref 5–15)
WBC # BLD AUTO: 6.93 K/UL (ref 3.9–12.7)

## 2023-12-31 PROCEDURE — 36415 COLL VENOUS BLD VENIPUNCTURE: CPT | Performed by: INTERNAL MEDICINE

## 2023-12-31 PROCEDURE — 25000003 PHARM REV CODE 250: Performed by: STUDENT IN AN ORGANIZED HEALTH CARE EDUCATION/TRAINING PROGRAM

## 2023-12-31 PROCEDURE — 80053 COMPREHEN METABOLIC PANEL: CPT | Performed by: STUDENT IN AN ORGANIZED HEALTH CARE EDUCATION/TRAINING PROGRAM

## 2023-12-31 PROCEDURE — 21400001 HC TELEMETRY ROOM

## 2023-12-31 PROCEDURE — 80076 HEPATIC FUNCTION PANEL: CPT | Performed by: INTERNAL MEDICINE

## 2023-12-31 PROCEDURE — 25000003 PHARM REV CODE 250: Performed by: HOSPITALIST

## 2023-12-31 PROCEDURE — 20000000 HC ICU ROOM

## 2023-12-31 PROCEDURE — 63600175 PHARM REV CODE 636 W HCPCS: Performed by: STUDENT IN AN ORGANIZED HEALTH CARE EDUCATION/TRAINING PROGRAM

## 2023-12-31 PROCEDURE — 85025 COMPLETE CBC W/AUTO DIFF WBC: CPT | Performed by: STUDENT IN AN ORGANIZED HEALTH CARE EDUCATION/TRAINING PROGRAM

## 2023-12-31 PROCEDURE — 63600175 PHARM REV CODE 636 W HCPCS: Performed by: HOSPITALIST

## 2023-12-31 PROCEDURE — 80197 ASSAY OF TACROLIMUS: CPT | Performed by: STUDENT IN AN ORGANIZED HEALTH CARE EDUCATION/TRAINING PROGRAM

## 2023-12-31 RX ORDER — MUPIROCIN 20 MG/G
OINTMENT TOPICAL DAILY
Status: DISCONTINUED | OUTPATIENT
Start: 2024-01-01 | End: 2023-12-31

## 2023-12-31 RX ORDER — MUPIROCIN 20 MG/G
OINTMENT TOPICAL 2 TIMES DAILY
Status: DISCONTINUED | OUTPATIENT
Start: 2023-12-31 | End: 2024-01-02 | Stop reason: HOSPADM

## 2023-12-31 RX ADMIN — INSULIN ASPART 4 UNITS: 100 INJECTION, SOLUTION INTRAVENOUS; SUBCUTANEOUS at 01:12

## 2023-12-31 RX ADMIN — THERA TABS 1 TABLET: TAB at 10:12

## 2023-12-31 RX ADMIN — TACROLIMUS 0.5 MG: 0.5 CAPSULE ORAL at 10:12

## 2023-12-31 RX ADMIN — BUPROPION HYDROCHLORIDE 150 MG: 150 TABLET, FILM COATED, EXTENDED RELEASE ORAL at 06:12

## 2023-12-31 RX ADMIN — QUETIAPINE FUMARATE 100 MG: 25 TABLET ORAL at 08:12

## 2023-12-31 RX ADMIN — INSULIN ASPART 4 UNITS: 100 INJECTION, SOLUTION INTRAVENOUS; SUBCUTANEOUS at 05:12

## 2023-12-31 RX ADMIN — FUROSEMIDE 40 MG: 10 INJECTION, SOLUTION INTRAVENOUS at 10:12

## 2023-12-31 RX ADMIN — INSULIN DETEMIR 8 UNITS: 100 INJECTION, SOLUTION SUBCUTANEOUS at 08:12

## 2023-12-31 RX ADMIN — CLOPIDOGREL BISULFATE 75 MG: 75 TABLET ORAL at 10:12

## 2023-12-31 RX ADMIN — INSULIN DETEMIR 8 UNITS: 100 INJECTION, SOLUTION SUBCUTANEOUS at 10:12

## 2023-12-31 RX ADMIN — APIXABAN 5 MG: 5 TABLET, FILM COATED ORAL at 10:12

## 2023-12-31 RX ADMIN — PANTOPRAZOLE SODIUM 40 MG: 40 TABLET, DELAYED RELEASE ORAL at 10:12

## 2023-12-31 RX ADMIN — MIDODRINE HYDROCHLORIDE 10 MG: 5 TABLET ORAL at 01:12

## 2023-12-31 RX ADMIN — MIDODRINE HYDROCHLORIDE 10 MG: 5 TABLET ORAL at 06:12

## 2023-12-31 RX ADMIN — APIXABAN 5 MG: 5 TABLET, FILM COATED ORAL at 08:12

## 2023-12-31 RX ADMIN — MUPIROCIN: 20 OINTMENT TOPICAL at 08:12

## 2023-12-31 RX ADMIN — INSULIN ASPART 4 UNITS: 100 INJECTION, SOLUTION INTRAVENOUS; SUBCUTANEOUS at 10:12

## 2023-12-31 RX ADMIN — ASPIRIN 81 MG: 81 TABLET, COATED ORAL at 10:12

## 2023-12-31 RX ADMIN — MIDODRINE HYDROCHLORIDE 10 MG: 5 TABLET ORAL at 05:12

## 2023-12-31 RX ADMIN — Medication 800 MG: at 08:12

## 2023-12-31 RX ADMIN — TACROLIMUS 0.5 MG: 0.5 CAPSULE ORAL at 05:12

## 2023-12-31 RX ADMIN — Medication 800 MG: at 05:12

## 2023-12-31 RX ADMIN — ATORVASTATIN CALCIUM 80 MG: 40 TABLET, FILM COATED ORAL at 10:12

## 2023-12-31 RX ADMIN — Medication 800 MG: at 10:12

## 2023-12-31 RX ADMIN — SERTRALINE HYDROCHLORIDE 25 MG: 25 TABLET ORAL at 10:12

## 2023-12-31 RX ADMIN — Medication 1 CAPSULE: at 10:12

## 2023-12-31 RX ADMIN — METOPROLOL SUCCINATE 12.5 MG: 25 TABLET, EXTENDED RELEASE ORAL at 10:12

## 2023-12-31 NOTE — PLAN OF CARE
Problem: Adult Inpatient Plan of Care  Goal: Plan of Care Review  Outcome: Ongoing, Progressing  Goal: Patient-Specific Goal (Individualized)  Outcome: Ongoing, Progressing  Goal: Absence of Hospital-Acquired Illness or Injury  Outcome: Ongoing, Progressing  Goal: Optimal Comfort and Wellbeing  Outcome: Ongoing, Progressing  Goal: Readiness for Transition of Care  Outcome: Ongoing, Progressing     Problem: Fluid Volume Excess  Goal: Fluid Balance  Outcome: Ongoing, Progressing

## 2023-12-31 NOTE — PLAN OF CARE
Problem: Adult Inpatient Plan of Care  Goal: Plan of Care Review  Outcome: Ongoing, Not Progressing  Goal: Optimal Comfort and Wellbeing  Outcome: Ongoing, Not Progressing     Problem: Fluid Volume Excess  Goal: Fluid Balance  Outcome: Ongoing, Not Progressing     AAOx4. 02 sats wnl to ra. Continues with iv diuretic. No acute issues at present time.

## 2023-12-31 NOTE — SUBJECTIVE & OBJECTIVE
Interval History: Still short of breath when getting up to walk to the bathroom without supplemental oxygen. Still having some lightheadedness. New right 2nd toe pain.    Review of Systems   Constitutional:  Negative for chills and fever.   Respiratory:  Positive for shortness of breath.    Gastrointestinal:  Negative for abdominal pain and vomiting.   Neurological:  Positive for light-headedness. Negative for syncope.     Objective:     Vital Signs (Most Recent):  Temp: 98 °F (36.7 °C) (12/31/23 1559)  Pulse: 88 (12/31/23 1559)  Resp: 18 (12/31/23 1559)  BP: 113/68 (12/31/23 1559)  SpO2: 98 % (12/31/23 1559) Vital Signs (24h Range):  Temp:  [98 °F (36.7 °C)-98.5 °F (36.9 °C)] 98 °F (36.7 °C)  Pulse:  [] 88  Resp:  [17-18] 18  SpO2:  [95 %-98 %] 98 %  BP: ()/(38-78) 113/68     Weight: 95.4 kg (210 lb 5.1 oz)  Body mass index is 28.52 kg/m².    Intake/Output Summary (Last 24 hours) at 12/31/2023 1614  Last data filed at 12/31/2023 1338  Gross per 24 hour   Intake 480 ml   Output 2000 ml   Net -1520 ml           Physical Exam  Vitals and nursing note reviewed.   Constitutional:       General: He is not in acute distress.     Appearance: He is well-developed. He is not toxic-appearing or diaphoretic.      Interventions: Nasal cannula in place.   Pulmonary:      Effort: Pulmonary effort is normal. No respiratory distress.   Musculoskeletal:      Comments: Right 2nd toe swelling around toenail.   Skin:     General: Skin is warm and dry.      Coloration: Skin is not jaundiced or pale.   Neurological:      Mental Status: He is alert and oriented to person, place, and time. Mental status is at baseline.      Motor: No seizure activity.   Psychiatric:         Attention and Perception: Attention normal.         Mood and Affect: Mood and affect normal.         Behavior: Behavior is cooperative.             Significant Labs: All pertinent labs within the past 24 hours have been reviewed.    Significant Imaging: I  have reviewed all pertinent imaging results/findings within the past 24 hours.

## 2023-12-31 NOTE — PROGRESS NOTES
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 He lives in [place], [place]. He is . His hepatologist is Dr. Thais Maxwell.               He presented to [facility] Emergency Department on 12/22/2023 with fatigue and progressive dyspnea over the last 3 days. He had dyspnea with minimal exertion (walking 2 to 3 feet) and orthopnea. He could not slep on his side and had been getting up and sitting in his recliner. He had difficulty sleeping resulting in fatigue from insomnia.              Chest X-ray showed patchy lung opacities. BNP was 539 pg/mL. He was given 40 mg of intravenous furosemide. He urinated 900 mL while in the emergency department. He was admitted to Hospital Medicine Team D.      Overview/Hospital Course:  Echocardiogram showed severely reduced ejection fraction of 20 to 25%, moderate to severe low gradient aortic stenosis. Cardiology was consulted. He had Q waves on EKG. There was concern that he had a myocardial infarction. His  amiodarone was discontinued. Interventional Cardiology was consulted. He was started on aspirin, a statin, and a beta-blocker. He underwent left heart catheterization via right radial artery showing 90% stenosis in the lateral circumflex artery. A stent was placed. He was started on dual antiplatelet therapy and isosorbide mononitrate. On 12/27/2023, he started having dizziness after taking his morning medications. Blood pressures were as low as 90s/50s while supine. The same happened the next morning, even without furosemide. Isosorbide mononitrate was stopped. He was orthostatic. Isosorbide mononitrate was stopped. He was given 250 mL of normal saline. He also developed new right lower quadrant abdominal pain that felt similar to prior nephrolithiasis. CT showed bilateral nephrolithiasis without evidence of obstruction. He continued to feel lightheaded with low blood pressures when standing. He was given 500 mL of normal saline. He also had shortness of breath and became hypoxic. Repeat chest X-ray showed nothing new. BNP was higher at 743 pg/mL, but his net fluid status was negative as he had produced more urine in relation to the small amounts of intravenous fluids he was given. D-dimer was elevated at 1.86 mg/L. Chest CTA showed no pulmonary embolism, instead showing interstitial pulmonary edema and trace bilateral pleural effusions and enlarged main pulmonary artery suggesting pulmonary hypertension. Metoprolol was also held. He was put on midodrine, which improved supine blood pressure to 140/83, with standing blood pressure of 105/55. He still felt lightheadedness. He was still hypoxic. Midodrine dose was increased and diuresis with furosemide was resumed. He developed right second toe pain and swelling at the toenail the night of 12/30/2023.     Interval History: Still short of breath when getting up to walk to the bathroom without supplemental oxygen. Still having some lightheadedness. New right 2nd toe  pain.    Review of Systems   Constitutional:  Negative for chills and fever.   Respiratory:  Positive for shortness of breath.    Gastrointestinal:  Negative for abdominal pain and vomiting.   Neurological:  Positive for light-headedness. Negative for syncope.     Objective:     Vital Signs (Most Recent):  Temp: 98 °F (36.7 °C) (12/31/23 1559)  Pulse: 88 (12/31/23 1559)  Resp: 18 (12/31/23 1559)  BP: 113/68 (12/31/23 1559)  SpO2: 98 % (12/31/23 1559) Vital Signs (24h Range):  Temp:  [98 °F (36.7 °C)-98.5 °F (36.9 °C)] 98 °F (36.7 °C)  Pulse:  [] 88  Resp:  [17-18] 18  SpO2:  [95 %-98 %] 98 %  BP: ()/(38-78) 113/68     Weight: 95.4 kg (210 lb 5.1 oz)  Body mass index is 28.52 kg/m².    Intake/Output Summary (Last 24 hours) at 12/31/2023 1614  Last data filed at 12/31/2023 1338  Gross per 24 hour   Intake 480 ml   Output 2000 ml   Net -1520 ml           Physical Exam  Vitals and nursing note reviewed.   Constitutional:       General: He is not in acute distress.     Appearance: He is well-developed. He is not toxic-appearing or diaphoretic.      Interventions: Nasal cannula in place.   Pulmonary:      Effort: Pulmonary effort is normal. No respiratory distress.   Musculoskeletal:      Comments: Right 2nd toe swelling around toenail.   Skin:     General: Skin is warm and dry.      Coloration: Skin is not jaundiced or pale.   Neurological:      Mental Status: He is alert and oriented to person, place, and time. Mental status is at baseline.      Motor: No seizure activity.   Psychiatric:         Attention and Perception: Attention normal.         Mood and Affect: Mood and affect normal.         Behavior: Behavior is cooperative.             Significant Labs: All pertinent labs within the past 24 hours have been reviewed.    Significant Imaging: I have reviewed all pertinent imaging results/findings within the past 24 hours.    Assessment/Plan:      Pain in toe of right foot  Give topical mupirocin for now.  No history of gout.     Hypoxia  Due to heart failure.     Nephrolithiasis  Give hydrocodone-acetaminophen prn. Get CT renal stone study.    Orthostatic dizziness  Stopped home amlodipine. Stopped isosorbide mononitrate and metoprolol, which were new. Persistent. Started midodrine. Persistent. Increase midodrine dose.    Unstable angina  Coronary artery disease involving native coronary artery of native heart without angina pectoris   Presence of drug-eluting stent in left circumflex coronary artery   Stent placed. Started aspirin, clopidrogel, metoprolol, isosorbide mononitrate. Stop isosorbide mononitrate due to orthostatic hypotension.     HFrEF (heart failure with reduced ejection fraction)  Continue diuresis.     Nonrheumatic aortic valve stenosis  Echo showed severe reduced EF of 20-25%, moderate to severe low gradient aortic stenosis.      Anemia due to unknown mechanism  Patient's anemia is currently uncontrolled. Has not received any PRBCs to date. Etiology likely d/t Iron deficiency  Current CBC reviewed-   Lab Results   Component Value Date    HGB 8.6 (L) 12/25/2023    HCT 30.9 (L) 12/25/2023     Monitor serial CBC and transfuse if patient becomes hemodynamically unstable, symptomatic or H/H drops below 7/21.    S/P liver transplant  Continue home tacrolimus.    Persistent atrial fibrillation  Continue home amiodarone, apixaban. Started metoprolol.    Type 2 diabetes mellitus, with long-term current use of insulin  He takes insulin glargine 22 units HS, insulin lispro 6 units TID with maels at home. Giving insulin detemir 8 units BID, insulin aspart 4 units TID with meals, sliding scale. Monitor Accuchecks.    Primary hypertension  Home amlodipine changed to metoprolol but metoprolol held due to orthostatic hypotension. Monitor blood pressures.      VTE Risk Mitigation (From admission, onward)           Ordered     apixaban tablet 5 mg  2 times daily         12/26/23 1202     Reason for No Pharmacological  VTE Prophylaxis  Once        Question:  Reasons:  Answer:  Already adequately anticoagulated on oral Anticoagulants    12/23/23 0020     IP VTE HIGH RISK PATIENT  Once         12/23/23 0020     Place sequential compression device  Until discontinued         12/23/23 0020                    Discharge Planning   FELISHA: 1/1/2024     Code Status: Full Code   Is the patient medically ready for discharge?: No    Reason for patient still in hospital (select all that apply): Treatment  Discharge Plan A: Home                  Sae Pearl MD  Department of Hospital Medicine   [facility] - Intensive Care ([address]

## 2024-01-01 LAB
ALBUMIN SERPL BCP-MCNC: 3.4 G/DL (ref 3.5–5.2)
ALBUMIN SERPL BCP-MCNC: 3.4 G/DL (ref 3.5–5.2)
ALP SERPL-CCNC: 71 U/L (ref 55–135)
ALP SERPL-CCNC: 71 U/L (ref 55–135)
ALT SERPL W/O P-5'-P-CCNC: 13 U/L (ref 10–44)
ALT SERPL W/O P-5'-P-CCNC: 13 U/L (ref 10–44)
ANION GAP SERPL CALC-SCNC: 8 MMOL/L (ref 8–16)
AST SERPL-CCNC: 12 U/L (ref 10–40)
AST SERPL-CCNC: 12 U/L (ref 10–40)
BASOPHILS # BLD AUTO: 0.08 K/UL (ref 0–0.2)
BASOPHILS NFR BLD: 1.1 % (ref 0–1.9)
BILIRUB DIRECT SERPL-MCNC: 0.3 MG/DL (ref 0.1–0.3)
BILIRUB SERPL-MCNC: 0.7 MG/DL (ref 0.1–1)
BILIRUB SERPL-MCNC: 0.7 MG/DL (ref 0.1–1)
BUN SERPL-MCNC: 20 MG/DL (ref 8–23)
CALCIUM SERPL-MCNC: 8.6 MG/DL (ref 8.7–10.5)
CHLORIDE SERPL-SCNC: 101 MMOL/L (ref 95–110)
CO2 SERPL-SCNC: 29 MMOL/L (ref 23–29)
CREAT SERPL-MCNC: 1.3 MG/DL (ref 0.5–1.4)
DIFFERENTIAL METHOD BLD: ABNORMAL
EOSINOPHIL # BLD AUTO: 0.2 K/UL (ref 0–0.5)
EOSINOPHIL NFR BLD: 2.2 % (ref 0–8)
ERYTHROCYTE [DISTWIDTH] IN BLOOD BY AUTOMATED COUNT: 19.7 % (ref 11.5–14.5)
EST. GFR  (NO RACE VARIABLE): 59.1 ML/MIN/1.73 M^2
GLUCOSE SERPL-MCNC: 206 MG/DL (ref 70–110)
HCT VFR BLD AUTO: 29.9 % (ref 40–54)
HGB BLD-MCNC: 8.2 G/DL (ref 14–18)
IMM GRANULOCYTES # BLD AUTO: 0.04 K/UL (ref 0–0.04)
IMM GRANULOCYTES NFR BLD AUTO: 0.6 % (ref 0–0.5)
LYMPHOCYTES # BLD AUTO: 1.1 K/UL (ref 1–4.8)
LYMPHOCYTES NFR BLD: 15.7 % (ref 18–48)
MCH RBC QN AUTO: 19.1 PG (ref 27–31)
MCHC RBC AUTO-ENTMCNC: 27.4 G/DL (ref 32–36)
MCV RBC AUTO: 70 FL (ref 82–98)
MONOCYTES # BLD AUTO: 0.8 K/UL (ref 0.3–1)
MONOCYTES NFR BLD: 11.7 % (ref 4–15)
NEUTROPHILS # BLD AUTO: 4.9 K/UL (ref 1.8–7.7)
NEUTROPHILS NFR BLD: 68.7 % (ref 38–73)
NRBC BLD-RTO: 0 /100 WBC
PLATELET # BLD AUTO: 167 K/UL (ref 150–450)
PMV BLD AUTO: ABNORMAL FL (ref 9.2–12.9)
POCT GLUCOSE: 132 MG/DL (ref 70–110)
POCT GLUCOSE: 145 MG/DL (ref 70–110)
POCT GLUCOSE: 155 MG/DL (ref 70–110)
POCT GLUCOSE: 182 MG/DL (ref 70–110)
POTASSIUM SERPL-SCNC: 3.6 MMOL/L (ref 3.5–5.1)
PROT SERPL-MCNC: 6.4 G/DL (ref 6–8.4)
PROT SERPL-MCNC: 6.4 G/DL (ref 6–8.4)
RBC # BLD AUTO: 4.3 M/UL (ref 4.6–6.2)
SODIUM SERPL-SCNC: 138 MMOL/L (ref 136–145)
TACROLIMUS BLD-MCNC: 3.2 NG/ML (ref 5–15)
WBC # BLD AUTO: 7.19 K/UL (ref 3.9–12.7)

## 2024-01-01 PROCEDURE — 80076 HEPATIC FUNCTION PANEL: CPT | Performed by: INTERNAL MEDICINE

## 2024-01-01 PROCEDURE — 80053 COMPREHEN METABOLIC PANEL: CPT | Performed by: STUDENT IN AN ORGANIZED HEALTH CARE EDUCATION/TRAINING PROGRAM

## 2024-01-01 PROCEDURE — 25000003 PHARM REV CODE 250: Performed by: STUDENT IN AN ORGANIZED HEALTH CARE EDUCATION/TRAINING PROGRAM

## 2024-01-01 PROCEDURE — 20000000 HC ICU ROOM

## 2024-01-01 PROCEDURE — 63600175 PHARM REV CODE 636 W HCPCS: Performed by: STUDENT IN AN ORGANIZED HEALTH CARE EDUCATION/TRAINING PROGRAM

## 2024-01-01 PROCEDURE — 21400001 HC TELEMETRY ROOM

## 2024-01-01 PROCEDURE — 85025 COMPLETE CBC W/AUTO DIFF WBC: CPT | Performed by: STUDENT IN AN ORGANIZED HEALTH CARE EDUCATION/TRAINING PROGRAM

## 2024-01-01 PROCEDURE — 63600175 PHARM REV CODE 636 W HCPCS: Performed by: HOSPITALIST

## 2024-01-01 PROCEDURE — 80197 ASSAY OF TACROLIMUS: CPT | Performed by: STUDENT IN AN ORGANIZED HEALTH CARE EDUCATION/TRAINING PROGRAM

## 2024-01-01 PROCEDURE — 25000003 PHARM REV CODE 250: Performed by: HOSPITALIST

## 2024-01-01 PROCEDURE — 36415 COLL VENOUS BLD VENIPUNCTURE: CPT | Performed by: INTERNAL MEDICINE

## 2024-01-01 RX ORDER — POTASSIUM CHLORIDE 20 MEQ/1
40 TABLET, EXTENDED RELEASE ORAL ONCE
Status: COMPLETED | OUTPATIENT
Start: 2024-01-01 | End: 2024-01-01

## 2024-01-01 RX ADMIN — QUETIAPINE FUMARATE 100 MG: 25 TABLET ORAL at 09:01

## 2024-01-01 RX ADMIN — Medication 800 MG: at 09:01

## 2024-01-01 RX ADMIN — SERTRALINE HYDROCHLORIDE 25 MG: 25 TABLET ORAL at 09:01

## 2024-01-01 RX ADMIN — MUPIROCIN: 20 OINTMENT TOPICAL at 09:01

## 2024-01-01 RX ADMIN — CLOPIDOGREL BISULFATE 75 MG: 75 TABLET ORAL at 09:01

## 2024-01-01 RX ADMIN — PANTOPRAZOLE SODIUM 40 MG: 40 TABLET, DELAYED RELEASE ORAL at 09:01

## 2024-01-01 RX ADMIN — INSULIN DETEMIR 8 UNITS: 100 INJECTION, SOLUTION SUBCUTANEOUS at 09:01

## 2024-01-01 RX ADMIN — ASPIRIN 81 MG: 81 TABLET, COATED ORAL at 09:01

## 2024-01-01 RX ADMIN — POTASSIUM CHLORIDE 40 MEQ: 1500 TABLET, EXTENDED RELEASE ORAL at 09:01

## 2024-01-01 RX ADMIN — Medication 1 CAPSULE: at 09:01

## 2024-01-01 RX ADMIN — ATORVASTATIN CALCIUM 80 MG: 40 TABLET, FILM COATED ORAL at 09:01

## 2024-01-01 RX ADMIN — Medication 800 MG: at 05:01

## 2024-01-01 RX ADMIN — MIDODRINE HYDROCHLORIDE 10 MG: 5 TABLET ORAL at 09:01

## 2024-01-01 RX ADMIN — INSULIN ASPART 4 UNITS: 100 INJECTION, SOLUTION INTRAVENOUS; SUBCUTANEOUS at 09:01

## 2024-01-01 RX ADMIN — MIDODRINE HYDROCHLORIDE 10 MG: 5 TABLET ORAL at 12:01

## 2024-01-01 RX ADMIN — APIXABAN 5 MG: 5 TABLET, FILM COATED ORAL at 09:01

## 2024-01-01 RX ADMIN — INSULIN ASPART 4 UNITS: 100 INJECTION, SOLUTION INTRAVENOUS; SUBCUTANEOUS at 05:01

## 2024-01-01 RX ADMIN — TACROLIMUS 0.5 MG: 0.5 CAPSULE ORAL at 09:01

## 2024-01-01 RX ADMIN — TACROLIMUS 0.5 MG: 0.5 CAPSULE ORAL at 05:01

## 2024-01-01 RX ADMIN — INSULIN ASPART 4 UNITS: 100 INJECTION, SOLUTION INTRAVENOUS; SUBCUTANEOUS at 12:01

## 2024-01-01 RX ADMIN — FUROSEMIDE 40 MG: 10 INJECTION, SOLUTION INTRAVENOUS at 09:01

## 2024-01-01 RX ADMIN — MIDODRINE HYDROCHLORIDE 10 MG: 5 TABLET ORAL at 05:01

## 2024-01-01 RX ADMIN — BUPROPION HYDROCHLORIDE 150 MG: 150 TABLET, FILM COATED, EXTENDED RELEASE ORAL at 05:01

## 2024-01-01 RX ADMIN — THERA TABS 1 TABLET: TAB at 09:01

## 2024-01-01 NOTE — SUBJECTIVE & OBJECTIVE
Interval History: Still short of breath when getting up to walk to the bathroom without supplemental oxygen. Still having some lightheadedness. Diuresing.    Review of Systems   Constitutional:  Negative for chills and fever.   Respiratory:  Positive for shortness of breath.    Gastrointestinal:  Negative for abdominal pain and vomiting.   Neurological:  Positive for light-headedness. Negative for syncope.     Objective:     Vital Signs (Most Recent):  Temp: 98.9 °F (37.2 °C) (01/01/24 1200)  Pulse: 83 (01/01/24 1200)  Resp: 17 (01/01/24 1200)  BP: 109/60 (01/01/24 1200)  SpO2: 98 % (01/01/24 1200) Vital Signs (24h Range):  Temp:  [97.6 °F (36.4 °C)-98.9 °F (37.2 °C)] 98.9 °F (37.2 °C)  Pulse:  [] 83  Resp:  [17-18] 17  SpO2:  [96 %-99 %] 98 %  BP: ()/(53-73) 109/60     Weight: 95.4 kg (210 lb 5.1 oz)  Body mass index is 28.52 kg/m².    Intake/Output Summary (Last 24 hours) at 1/1/2024 1310  Last data filed at 1/1/2024 1000  Gross per 24 hour   Intake 480 ml   Output 1200 ml   Net -720 ml           Physical Exam  Vitals and nursing note reviewed.   Constitutional:       General: He is not in acute distress.     Appearance: He is well-developed. He is not toxic-appearing or diaphoretic.      Interventions: Nasal cannula in place.   Pulmonary:      Effort: Pulmonary effort is normal. No respiratory distress.      Breath sounds: No stridor. No wheezing.   Skin:     General: Skin is warm and dry.      Coloration: Skin is not jaundiced or pale.   Neurological:      Mental Status: He is alert and oriented to person, place, and time. Mental status is at baseline.      Motor: No seizure activity.   Psychiatric:         Attention and Perception: Attention normal.         Mood and Affect: Mood and affect normal.         Behavior: Behavior is cooperative.             Significant Labs: All pertinent labs within the past 24 hours have been reviewed.    Significant Imaging: I have reviewed all pertinent imaging  results/findings within the past 24 hours.

## 2024-01-01 NOTE — PLAN OF CARE
Problem: Adult Inpatient Plan of Care  Goal: Plan of Care Review  Outcome: Ongoing, Not Progressing  Goal: Optimal Comfort and Wellbeing  Outcome: Ongoing, Not Progressing     Problem: Fluid Volume Excess  Goal: Fluid Balance  Outcome: Ongoing, Not Progressing     AAOx4. Continues iv lasix. Midodrine in place for hypotensive episodes. B/p currently 113/68. No complaints voiced.

## 2024-01-01 NOTE — ASSESSMENT & PLAN NOTE
Continue diuresis. Monitor electrolytes, intake and output. Treat hypokalemia with potassium chloride.

## 2024-01-01 NOTE — PROGRESS NOTES
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 He lives in [place], [place]. He is . His hepatologist is Dr. Thais Maxwell.               He presented to [facility] Emergency Department on 12/22/2023 with fatigue and progressive dyspnea over the last 3 days. He had dyspnea with minimal exertion (walking 2 to 3 feet) and orthopnea. He could not slep on his side and had been getting up and sitting in his recliner. He had difficulty sleeping resulting in fatigue from insomnia.              Chest X-ray showed patchy lung opacities. BNP was 539 pg/mL. He was given 40 mg of intravenous furosemide. He urinated 900 mL while in the emergency department. He was admitted to Hospital Medicine Team D.      Overview/Hospital Course:  Echocardiogram showed severely reduced ejection fraction of 20 to 25%, moderate to severe low gradient aortic stenosis. Cardiology was consulted. He had Q waves on EKG. There was concern that he had a myocardial infarction. His  amiodarone was discontinued. Interventional Cardiology was consulted. He was started on aspirin, a statin, and a beta-blocker. He underwent left heart catheterization via right radial artery showing 90% stenosis in the lateral circumflex artery. A stent was placed. He was started on dual antiplatelet therapy and isosorbide mononitrate. On 12/27/2023, he started having dizziness after taking his morning medications. Blood pressures were as low as 90s/50s while supine. The same happened the next morning, even without furosemide. Isosorbide mononitrate was stopped. He was orthostatic. Isosorbide mononitrate was stopped. He was given 250 mL of normal saline. He also developed new right lower quadrant abdominal pain that felt similar to prior nephrolithiasis. CT showed bilateral nephrolithiasis without evidence of obstruction. He continued to feel lightheaded with low blood pressures when standing. He was given 500 mL of normal saline. He also had shortness of breath and became hypoxic. Repeat chest X-ray showed nothing new. BNP was higher at 743 pg/mL, but his net fluid status was negative as he had produced more urine in relation to the small amounts of intravenous fluids he was given. D-dimer was elevated at 1.86 mg/L. Chest CTA showed no pulmonary embolism, instead showing interstitial pulmonary edema and trace bilateral pleural effusions and enlarged main pulmonary artery suggesting pulmonary hypertension. Metoprolol was also held. He was put on midodrine, which improved supine blood pressure to 140/83, with standing blood pressure of 105/55. He still felt lightheadedness. He was still hypoxic. Midodrine dose was increased and diuresis with furosemide was resumed. He developed right second toe pain and swelling at the toenail the night of 12/30/2023. Topical mupirocin was applied.     Interval History: Still short of breath when getting up to walk to the bathroom without supplemental oxygen. Still having some  lightheadedness. Diuresing.    Review of Systems   Constitutional:  Negative for chills and fever.   Respiratory:  Positive for shortness of breath.    Gastrointestinal:  Negative for abdominal pain and vomiting.   Neurological:  Positive for light-headedness. Negative for syncope.     Objective:     Vital Signs (Most Recent):  Temp: 98.9 °F (37.2 °C) (01/01/24 1200)  Pulse: 83 (01/01/24 1200)  Resp: 17 (01/01/24 1200)  BP: 109/60 (01/01/24 1200)  SpO2: 98 % (01/01/24 1200) Vital Signs (24h Range):  Temp:  [97.6 °F (36.4 °C)-98.9 °F (37.2 °C)] 98.9 °F (37.2 °C)  Pulse:  [] 83  Resp:  [17-18] 17  SpO2:  [96 %-99 %] 98 %  BP: ()/(53-73) 109/60     Weight: 95.4 kg (210 lb 5.1 oz)  Body mass index is 28.52 kg/m².    Intake/Output Summary (Last 24 hours) at 1/1/2024 1310  Last data filed at 1/1/2024 1000  Gross per 24 hour   Intake 480 ml   Output 1200 ml   Net -720 ml           Physical Exam  Vitals and nursing note reviewed.   Constitutional:       General: He is not in acute distress.     Appearance: He is well-developed. He is not toxic-appearing or diaphoretic.      Interventions: Nasal cannula in place.   Pulmonary:      Effort: Pulmonary effort is normal. No respiratory distress.      Breath sounds: No stridor. No wheezing.   Skin:     General: Skin is warm and dry.      Coloration: Skin is not jaundiced or pale.   Neurological:      Mental Status: He is alert and oriented to person, place, and time. Mental status is at baseline.      Motor: No seizure activity.   Psychiatric:         Attention and Perception: Attention normal.         Mood and Affect: Mood and affect normal.         Behavior: Behavior is cooperative.             Significant Labs: All pertinent labs within the past 24 hours have been reviewed.    Significant Imaging: I have reviewed all pertinent imaging results/findings within the past 24 hours.    Assessment/Plan:      Pain in toe of right foot  Giving topical mupirocin for now. No  history of gout.     Hypoxia  Due to heart failure.     Nephrolithiasis  Give hydrocodone-acetaminophen prn. Get CT renal stone study.    Orthostatic dizziness  Stopped home amlodipine. Stopped isosorbide mononitrate and metoprolol, which were new. Persistent. Started midodrine. Persistent. Increase midodrine dose.    Unstable angina  Coronary artery disease involving native coronary artery of native heart without angina pectoris   Presence of drug-eluting stent in left circumflex coronary artery   Stent placed. Started aspirin, clopidrogel, metoprolol, isosorbide mononitrate. Stop isosorbide mononitrate due to orthostatic hypotension.     HFrEF (heart failure with reduced ejection fraction)  Continue diuresis. Monitor electrolytes, intake and output. Treat hypokalemia with potassium chloride.    Nonrheumatic aortic valve stenosis  Echo showed severe reduced EF of 20-25%, moderate to severe low gradient aortic stenosis.      Anemia due to unknown mechanism  Patient's anemia is currently uncontrolled. Has not received any PRBCs to date. Etiology likely d/t Iron deficiency  Current CBC reviewed-   Lab Results   Component Value Date    HGB 8.6 (L) 12/25/2023    HCT 30.9 (L) 12/25/2023     Monitor serial CBC and transfuse if patient becomes hemodynamically unstable, symptomatic or H/H drops below 7/21.    S/P liver transplant  Continue home tacrolimus.    Persistent atrial fibrillation  Continue home amiodarone, apixaban. Started metoprolol.    Type 2 diabetes mellitus, with long-term current use of insulin  He takes insulin glargine 22 units HS, insulin lispro 6 units TID with maels at home. Giving insulin detemir 8 units BID, insulin aspart 4 units TID with meals, sliding scale. Monitor Accuchecks.    Primary hypertension  Home amlodipine changed to metoprolol but metoprolol held due to orthostatic hypotension. Monitor blood pressures.      VTE Risk Mitigation (From admission, onward)           Ordered     apixaban  tablet 5 mg  2 times daily         12/26/23 1202     Reason for No Pharmacological VTE Prophylaxis  Once        Question:  Reasons:  Answer:  Already adequately anticoagulated on oral Anticoagulants    12/23/23 0020     IP VTE HIGH RISK PATIENT  Once         12/23/23 0020     Place sequential compression device  Until discontinued         12/23/23 0020                    Discharge Planning   FELISHA: 1/1/2024     Code Status: Full Code   Is the patient medically ready for discharge?: No    Reason for patient still in hospital (select all that apply): Patient trending condition and Treatment  Discharge Plan A: Home                  Sae Pearl MD  Department of Hospital Medicine   [facility] - Intensive Care ([address]

## 2024-01-01 NOTE — PLAN OF CARE
Problem: Adult Inpatient Plan of Care  Goal: Plan of Care Review  Outcome: Ongoing, Not Progressing  Goal: Optimal Comfort and Wellbeing  Outcome: Ongoing, Not Progressing     Problem: Fluid Volume Excess  Goal: Fluid Balance  Outcome: Ongoing, Not Progressing     AAOx4. Amb in room without difficulty no c/o vertigo or lightheadedness at present time; No c/o sob. B/p currently 135/73.

## 2024-01-02 VITALS
HEIGHT: 72 IN | RESPIRATION RATE: 18 BRPM | DIASTOLIC BLOOD PRESSURE: 71 MMHG | SYSTOLIC BLOOD PRESSURE: 132 MMHG | BODY MASS INDEX: 28.48 KG/M2 | WEIGHT: 210.31 LBS | TEMPERATURE: 98 F | OXYGEN SATURATION: 96 % | HEART RATE: 86 BPM

## 2024-01-02 PROBLEM — M79.674 PAIN IN TOE OF RIGHT FOOT: Status: RESOLVED | Noted: 2023-12-31 | Resolved: 2024-01-02

## 2024-01-02 PROBLEM — R09.02 HYPOXIA: Status: RESOLVED | Noted: 2023-12-29 | Resolved: 2024-01-02

## 2024-01-02 LAB
ALBUMIN SERPL BCP-MCNC: 3.4 G/DL (ref 3.5–5.2)
ALBUMIN SERPL BCP-MCNC: 3.4 G/DL (ref 3.5–5.2)
ALP SERPL-CCNC: 74 U/L (ref 55–135)
ALP SERPL-CCNC: 74 U/L (ref 55–135)
ALT SERPL W/O P-5'-P-CCNC: 9 U/L (ref 10–44)
ALT SERPL W/O P-5'-P-CCNC: 9 U/L (ref 10–44)
ANION GAP SERPL CALC-SCNC: 11 MMOL/L (ref 8–16)
AST SERPL-CCNC: 15 U/L (ref 10–40)
AST SERPL-CCNC: 15 U/L (ref 10–40)
BASOPHILS # BLD AUTO: 0.08 K/UL (ref 0–0.2)
BASOPHILS NFR BLD: 1 % (ref 0–1.9)
BILIRUB DIRECT SERPL-MCNC: 0.3 MG/DL (ref 0.1–0.3)
BILIRUB SERPL-MCNC: 0.7 MG/DL (ref 0.1–1)
BILIRUB SERPL-MCNC: 0.7 MG/DL (ref 0.1–1)
BUN SERPL-MCNC: 26 MG/DL (ref 8–23)
CALCIUM SERPL-MCNC: 8.8 MG/DL (ref 8.7–10.5)
CHLORIDE SERPL-SCNC: 100 MMOL/L (ref 95–110)
CO2 SERPL-SCNC: 26 MMOL/L (ref 23–29)
CREAT SERPL-MCNC: 1.2 MG/DL (ref 0.5–1.4)
DIFFERENTIAL METHOD BLD: ABNORMAL
EOSINOPHIL # BLD AUTO: 0.1 K/UL (ref 0–0.5)
EOSINOPHIL NFR BLD: 1.6 % (ref 0–8)
ERYTHROCYTE [DISTWIDTH] IN BLOOD BY AUTOMATED COUNT: 19.1 % (ref 11.5–14.5)
EST. GFR  (NO RACE VARIABLE): >60 ML/MIN/1.73 M^2
GLUCOSE SERPL-MCNC: 210 MG/DL (ref 70–110)
HCT VFR BLD AUTO: 30.4 % (ref 40–54)
HGB BLD-MCNC: 8.3 G/DL (ref 14–18)
IMM GRANULOCYTES # BLD AUTO: 0.03 K/UL (ref 0–0.04)
IMM GRANULOCYTES NFR BLD AUTO: 0.4 % (ref 0–0.5)
LYMPHOCYTES # BLD AUTO: 1.1 K/UL (ref 1–4.8)
LYMPHOCYTES NFR BLD: 13.6 % (ref 18–48)
MCH RBC QN AUTO: 18.9 PG (ref 27–31)
MCHC RBC AUTO-ENTMCNC: 27.3 G/DL (ref 32–36)
MCV RBC AUTO: 69 FL (ref 82–98)
MONOCYTES # BLD AUTO: 1.1 K/UL (ref 0.3–1)
MONOCYTES NFR BLD: 13 % (ref 4–15)
NEUTROPHILS # BLD AUTO: 5.8 K/UL (ref 1.8–7.7)
NEUTROPHILS NFR BLD: 70.4 % (ref 38–73)
NRBC BLD-RTO: 0 /100 WBC
PLATELET # BLD AUTO: 194 K/UL (ref 150–450)
PMV BLD AUTO: ABNORMAL FL (ref 9.2–12.9)
POCT GLUCOSE: 157 MG/DL (ref 70–110)
POCT GLUCOSE: 182 MG/DL (ref 70–110)
POTASSIUM SERPL-SCNC: 4 MMOL/L (ref 3.5–5.1)
PROT SERPL-MCNC: 6.6 G/DL (ref 6–8.4)
PROT SERPL-MCNC: 6.6 G/DL (ref 6–8.4)
RBC # BLD AUTO: 4.39 M/UL (ref 4.6–6.2)
SODIUM SERPL-SCNC: 137 MMOL/L (ref 136–145)
TACROLIMUS BLD-MCNC: 3.9 NG/ML (ref 5–15)
WBC # BLD AUTO: 8.16 K/UL (ref 3.9–12.7)

## 2024-01-02 PROCEDURE — 80053 COMPREHEN METABOLIC PANEL: CPT | Performed by: STUDENT IN AN ORGANIZED HEALTH CARE EDUCATION/TRAINING PROGRAM

## 2024-01-02 PROCEDURE — 63600175 PHARM REV CODE 636 W HCPCS: Performed by: STUDENT IN AN ORGANIZED HEALTH CARE EDUCATION/TRAINING PROGRAM

## 2024-01-02 PROCEDURE — 25000003 PHARM REV CODE 250: Performed by: HOSPITALIST

## 2024-01-02 PROCEDURE — 80197 ASSAY OF TACROLIMUS: CPT | Performed by: STUDENT IN AN ORGANIZED HEALTH CARE EDUCATION/TRAINING PROGRAM

## 2024-01-02 PROCEDURE — 63600175 PHARM REV CODE 636 W HCPCS: Performed by: HOSPITALIST

## 2024-01-02 PROCEDURE — 85025 COMPLETE CBC W/AUTO DIFF WBC: CPT | Performed by: STUDENT IN AN ORGANIZED HEALTH CARE EDUCATION/TRAINING PROGRAM

## 2024-01-02 PROCEDURE — 80076 HEPATIC FUNCTION PANEL: CPT | Performed by: INTERNAL MEDICINE

## 2024-01-02 PROCEDURE — 25000003 PHARM REV CODE 250: Performed by: STUDENT IN AN ORGANIZED HEALTH CARE EDUCATION/TRAINING PROGRAM

## 2024-01-02 PROCEDURE — 36415 COLL VENOUS BLD VENIPUNCTURE: CPT | Performed by: INTERNAL MEDICINE

## 2024-01-02 RX ORDER — CLOPIDOGREL BISULFATE 75 MG/1
75 TABLET ORAL DAILY
Qty: 30 TABLET | Refills: 11 | Status: SHIPPED | OUTPATIENT
Start: 2024-01-03 | End: 2025-01-02

## 2024-01-02 RX ORDER — MIDODRINE HYDROCHLORIDE 10 MG/1
10 TABLET ORAL
Qty: 90 TABLET | Refills: 11 | Status: SHIPPED | OUTPATIENT
Start: 2024-01-02 | End: 2024-01-26

## 2024-01-02 RX ORDER — METOPROLOL SUCCINATE 25 MG/1
12.5 TABLET, EXTENDED RELEASE ORAL DAILY
Qty: 15 TABLET | Refills: 11 | Status: SHIPPED | OUTPATIENT
Start: 2024-01-03 | End: 2024-01-24

## 2024-01-02 RX ORDER — ATORVASTATIN CALCIUM 80 MG/1
80 TABLET, FILM COATED ORAL DAILY
Qty: 30 TABLET | Refills: 11 | Status: SHIPPED | OUTPATIENT
Start: 2024-01-03 | End: 2025-01-02

## 2024-01-02 RX ADMIN — PANTOPRAZOLE SODIUM 40 MG: 40 TABLET, DELAYED RELEASE ORAL at 10:01

## 2024-01-02 RX ADMIN — TACROLIMUS 0.5 MG: 0.5 CAPSULE ORAL at 10:01

## 2024-01-02 RX ADMIN — FUROSEMIDE 40 MG: 10 INJECTION, SOLUTION INTRAVENOUS at 10:01

## 2024-01-02 RX ADMIN — MIDODRINE HYDROCHLORIDE 10 MG: 5 TABLET ORAL at 04:01

## 2024-01-02 RX ADMIN — INSULIN ASPART 4 UNITS: 100 INJECTION, SOLUTION INTRAVENOUS; SUBCUTANEOUS at 12:01

## 2024-01-02 RX ADMIN — INSULIN ASPART 4 UNITS: 100 INJECTION, SOLUTION INTRAVENOUS; SUBCUTANEOUS at 04:01

## 2024-01-02 RX ADMIN — CLOPIDOGREL BISULFATE 75 MG: 75 TABLET ORAL at 10:01

## 2024-01-02 RX ADMIN — Medication 1 CAPSULE: at 10:01

## 2024-01-02 RX ADMIN — THERA TABS 1 TABLET: TAB at 10:01

## 2024-01-02 RX ADMIN — Medication 800 MG: at 04:01

## 2024-01-02 RX ADMIN — APIXABAN 5 MG: 5 TABLET, FILM COATED ORAL at 10:01

## 2024-01-02 RX ADMIN — Medication 800 MG: at 10:01

## 2024-01-02 RX ADMIN — INSULIN DETEMIR 8 UNITS: 100 INJECTION, SOLUTION SUBCUTANEOUS at 10:01

## 2024-01-02 RX ADMIN — SERTRALINE HYDROCHLORIDE 25 MG: 25 TABLET ORAL at 10:01

## 2024-01-02 RX ADMIN — MIDODRINE HYDROCHLORIDE 10 MG: 5 TABLET ORAL at 10:01

## 2024-01-02 RX ADMIN — MIDODRINE HYDROCHLORIDE 10 MG: 5 TABLET ORAL at 12:01

## 2024-01-02 RX ADMIN — INSULIN ASPART 4 UNITS: 100 INJECTION, SOLUTION INTRAVENOUS; SUBCUTANEOUS at 10:01

## 2024-01-02 RX ADMIN — ASPIRIN 81 MG: 81 TABLET, COATED ORAL at 10:01

## 2024-01-02 RX ADMIN — BUPROPION HYDROCHLORIDE 150 MG: 150 TABLET, FILM COATED, EXTENDED RELEASE ORAL at 06:01

## 2024-01-02 RX ADMIN — ATORVASTATIN CALCIUM 80 MG: 40 TABLET, FILM COATED ORAL at 10:01

## 2024-01-02 RX ADMIN — METOPROLOL SUCCINATE 12.5 MG: 25 TABLET, EXTENDED RELEASE ORAL at 10:01

## 2024-01-02 NOTE — PLAN OF CARE
CM spoke with pt in room.  He states partner Aniya will give him a ride home.  Denies DME needs.  CM instructed in d/c process.  Pt v/u.    POLLY ÁlvarezN, BS, RN, CCM       Carac Counseling:  I discussed with the patient the risks of Carac including but not limited to erythema, scaling, itching, weeping, crusting, and pain.

## 2024-01-02 NOTE — PROGRESS NOTES
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 He lives in [place], [place]. He is . His hepatologist is Dr. Thais Maxwell.               He presented to [facility] Emergency Department on 12/22/2023 with fatigue and progressive dyspnea over the last 3 days. He had dyspnea with minimal exertion (walking 2 to 3 feet) and orthopnea. He could not slep on his side and had been getting up and sitting in his recliner. He had difficulty sleeping resulting in fatigue from insomnia.              Chest X-ray showed patchy lung opacities. BNP was 539 pg/mL. He was given 40 mg of intravenous furosemide. He urinated 900 mL while in the emergency department. He was admitted to Hospital Medicine Team D.      Overview/Hospital Course:  Echocardiogram showed severely reduced ejection fraction of 20 to 25%, moderate to severe low gradient aortic stenosis. Cardiology was consulted. He had Q waves on EKG. There was concern that he had a myocardial infarction. His  amiodarone was discontinued. Interventional Cardiology was consulted. He was started on aspirin, a statin, and a beta-blocker. He underwent left heart catheterization via right radial artery showing 90% stenosis in the lateral circumflex artery. A stent was placed. He was started on dual antiplatelet therapy and isosorbide mononitrate. On 12/27/2023, he started having dizziness after taking his morning medications. Blood pressures were as low as 90s/50s while supine. The same happened the next morning, even without furosemide. Isosorbide mononitrate was stopped. He was orthostatic. Isosorbide mononitrate was stopped. He was given 250 mL of normal saline. He also developed new right lower quadrant abdominal pain that felt similar to prior nephrolithiasis. CT showed bilateral nephrolithiasis without evidence of obstruction. He continued to feel lightheaded with low blood pressures when standing. He was given 500 mL of normal saline. He also had shortness of breath and became hypoxic. Repeat chest X-ray showed nothing new. BNP was higher at 743 pg/mL, but his net fluid status was negative as he had produced more urine in relation to the small amounts of intravenous fluids he was given. D-dimer was elevated at 1.86 mg/L. Chest CTA showed no pulmonary embolism, instead showing interstitial pulmonary edema and trace bilateral pleural effusions and enlarged main pulmonary artery suggesting pulmonary hypertension. Metoprolol dose was decreased. He was put on midodrine, which improved supine blood pressure to 140/83, with standing blood pressure of 105/55. He still felt lightheadedness. He was still hypoxic. Midodrine dose was increased and diuresis with furosemide was resumed. He developed right second toe pain and swelling at the toenail the night of 12/30/2023. Topical mupirocin was applied. He had a temperature of 100.8° Fahrenheit on 1/1/2024. He felt well and had no new symptoms. On the contrary, his toe pain improved.  On 1/3/2024 he was taken off supplemental oxygen and felt well enough to go home.    Interval History: Had a lone febrile temperature. He thinks it was an error as he felt well. Toe pain has improved. No new symptoms. Off oxygen.    Review of Systems   Constitutional:  Negative for chills and fever.   Respiratory:  Negative for shortness of breath.    Gastrointestinal:  Negative for abdominal pain and vomiting.   Neurological:  Negative for seizures and syncope.     Objective:     Vital Signs (Most Recent):  Temp: 98.4 °F (36.9 °C) (01/02/24 1204)  Pulse: 86 (01/02/24 1204)  Resp: 18 (01/02/24 1204)  BP: 132/71 (01/02/24 1204)  SpO2: 96 % (01/02/24 1204) Vital Signs (24h Range):  Temp:  [98.3 °F (36.8 °C)-100.8 °F (38.2 °C)] 98.4 °F (36.9 °C)  Pulse:  [77-91] 86  Resp:  [17-18] 18  SpO2:  [96 %-98 %] 96 %  BP: (102-149)/(57-77) 132/71     Weight: 95.4 kg (210 lb 5.1 oz)  Body mass index is 28.52 kg/m².    Intake/Output Summary (Last 24 hours) at 1/2/2024 1453  Last data filed at 1/2/2024 1341  Gross per 24 hour   Intake 360 ml   Output 2100 ml   Net -1740 ml           Physical Exam  Vitals and nursing note reviewed.   Constitutional:       General: He is not in acute distress.     Appearance: He is well-developed. He is not toxic-appearing or diaphoretic.      Interventions: Nasal cannula in place.   Pulmonary:      Effort: Pulmonary effort is normal. No respiratory distress.      Breath sounds: No stridor. No wheezing.   Skin:     General: Skin is warm and dry.      Coloration: Skin is not jaundiced or pale.   Neurological:      Mental Status: He is alert and oriented to person, place, and time. Mental status is at baseline.      Motor: No seizure activity.   Psychiatric:         Attention and Perception: Attention normal.         Mood and Affect: Mood and affect normal.         Behavior: Behavior is cooperative.             Significant Labs: All pertinent labs within the past 24 hours have been  reviewed.    Significant Imaging: I have reviewed all pertinent imaging results/findings within the past 24 hours.    Assessment/Plan:      Nephrolithiasis  Give hydrocodone-acetaminophen prn. Get CT renal stone study.    Orthostatic dizziness  Stopped home amlodipine. Stopped isosorbide mononitrate, which was new. Decreased dose of metoprolol, which was new. Started midodrine. Improved.    Unstable angina  Coronary artery disease involving native coronary artery of native heart without angina pectoris   Presence of drug-eluting stent in left circumflex coronary artery   Stent placed. Started aspirin, clopidrogel, metoprolol, isosorbide mononitrate. Stop isosorbide mononitrate due to orthostatic hypotension.     HFrEF (heart failure with reduced ejection fraction)  Diuresed. Monitor electrolytes, intake and output.     Nonrheumatic aortic valve stenosis  Echo showed severe reduced EF of 20-25%, moderate to severe low gradient aortic stenosis.      Anemia due to unknown mechanism  Patient's anemia is currently uncontrolled. Has not received any PRBCs to date. Etiology likely d/t Iron deficiency  Current CBC reviewed-   Lab Results   Component Value Date    HGB 8.6 (L) 12/25/2023    HCT 30.9 (L) 12/25/2023     Monitor serial CBC and transfuse if patient becomes hemodynamically unstable, symptomatic or H/H drops below 7/21.    S/P liver transplant  Continue home tacrolimus.    Persistent atrial fibrillation  Continue home apixaban. Amiodarone changed to metoprolol.    Type 2 diabetes mellitus, with long-term current use of insulin  He takes insulin glargine 22 units HS, insulin lispro 6 units TID with maels at home. Giving insulin detemir 8 units BID, insulin aspart 4 units TID with meals, sliding scale. Monitor Accuchecks.    Primary hypertension  Home amlodipine changed to metoprolol. Monitor blood pressures.      VTE Risk Mitigation (From admission, onward)           Ordered     apixaban tablet 5 mg  2 times daily          12/26/23 1202     Reason for No Pharmacological VTE Prophylaxis  Once        Question:  Reasons:  Answer:  Already adequately anticoagulated on oral Anticoagulants    12/23/23 0020     IP VTE HIGH RISK PATIENT  Once         12/23/23 0020     Place sequential compression device  Until discontinued         12/23/23 0020                    Discharge Planning   FELISHA: 1/2/2024     Code Status: Full Code   Is the patient medically ready for discharge?: Yes      Discharge Plan A: Home                  Sae Pearl MD  Department of Hospital Medicine   [facility] - Intensive Care ([address]

## 2024-01-02 NOTE — SUBJECTIVE & OBJECTIVE
Interval History: Had a lone febrile temperature. He thinks it was an error as he felt well. Toe pain has improved. No new symptoms. Off oxygen.    Review of Systems   Constitutional:  Negative for chills and fever.   Respiratory:  Negative for shortness of breath.    Gastrointestinal:  Negative for abdominal pain and vomiting.   Neurological:  Negative for seizures and syncope.     Objective:     Vital Signs (Most Recent):  Temp: 98.4 °F (36.9 °C) (01/02/24 1204)  Pulse: 86 (01/02/24 1204)  Resp: 18 (01/02/24 1204)  BP: 132/71 (01/02/24 1204)  SpO2: 96 % (01/02/24 1204) Vital Signs (24h Range):  Temp:  [98.3 °F (36.8 °C)-100.8 °F (38.2 °C)] 98.4 °F (36.9 °C)  Pulse:  [77-91] 86  Resp:  [17-18] 18  SpO2:  [96 %-98 %] 96 %  BP: (102-149)/(57-77) 132/71     Weight: 95.4 kg (210 lb 5.1 oz)  Body mass index is 28.52 kg/m².    Intake/Output Summary (Last 24 hours) at 1/2/2024 1453  Last data filed at 1/2/2024 1341  Gross per 24 hour   Intake 360 ml   Output 2100 ml   Net -1740 ml           Physical Exam  Vitals and nursing note reviewed.   Constitutional:       General: He is not in acute distress.     Appearance: He is well-developed. He is not toxic-appearing or diaphoretic.      Interventions: Nasal cannula in place.   Pulmonary:      Effort: Pulmonary effort is normal. No respiratory distress.      Breath sounds: No stridor. No wheezing.   Skin:     General: Skin is warm and dry.      Coloration: Skin is not jaundiced or pale.   Neurological:      Mental Status: He is alert and oriented to person, place, and time. Mental status is at baseline.      Motor: No seizure activity.   Psychiatric:         Attention and Perception: Attention normal.         Mood and Affect: Mood and affect normal.         Behavior: Behavior is cooperative.             Significant Labs: All pertinent labs within the past 24 hours have been reviewed.    Significant Imaging: I have reviewed all pertinent imaging results/findings within the past  24 hours.

## 2024-01-02 NOTE — PHYSICIAN QUERY
PT Name: Tej Purdy  MR #: [id number]     DOCUMENTATION CLARIFICATION      CDS/: Madeline Phelps RN              Contact information:[email or web address]    This form is a permanent document in the medical record.     Query Date: January 2, 2024    Dear Provider,  By submitting this query, we are merely seeking further clarification of documentation.  Please utilize your independent clinical judgment when addressing the question(s) below.     The Medical Record contains the following:    Supporting Clinical Findings Location in Medical Record   Unstable angina  Patient with chest tightness, shortness of breath, and dyspnea on exertion starting 12/17. Patient presented to hospital on 12/22 at which time his troponin was negative (<0.02). EKG with Q waves, no ST elevation or T wave inversions.     Suspect that patient had MI on 12/17 and by the time he presented to the hospital his troponin was WNL. Due to patient's continued SOB and dyspnea on exertion with adequate diuresis and euvolemia on examination, patient's symptoms likely due to cardiac ischemia.     [facility] with severe coronary artery disease in the Lcx. S/p PCI.     Recommendations:  - Per IC: ASA for 1 month. Plavix and Eliquis for 1 year.   - Continue statin. Continue BB.  - Optimize GDMT, titrate outpatient  - Cardiac rehab outpatient   Cardiology note 12-26                 Please clarify if the NSTEMI diagnosis has been:    [  x] Ruled In   [  ] Ruled Out   [  ] Other/Clarification of findings (please specify): _______________    [   ] Clinically undetermined       Please document in your progress notes daily for the duration of treatment, until resolved, and include in your discharge summary.    Form No. [id number]

## 2024-01-02 NOTE — ASSESSMENT & PLAN NOTE
Stopped home amlodipine. Stopped isosorbide mononitrate, which was new. Decreased dose of metoprolol, which was new. Started midodrine. Improved.

## 2024-01-02 NOTE — PHYSICIAN QUERY
PT Name: Tej Purdy  MR #: [id number]     DOCUMENTATION CLARIFICATION     CDS/: Madeline Phelps RN            Contact information:[email or web address]  This form is a permanent document in the medical record.     Query Date: January 2, 2024    By submitting this query, we are merely seeking further clarification of documentation.  Please utilize your independent clinical judgment when addressing the question(s) below.    The Medical Record contains the following   Indicators Supporting Clinical Findings Location in Medical Record   x Heart Failure documented Acute diastolic (congestive) heart failure    CHF exacerbation   HFrEF (heart failure with reduced ejection fraction) H&P    Hepatology note 12-23  HM note 12-24   x BNP BNP= 539 Lab 12-22   x EF/Echo   Left Ventricle: The left ventricle is normal in size. Mildly increased wall thickness. There is concentric remodeling. There is severely reduced systolic function with a visually estimated ejection fraction of 20 - 25%. All walls except for the basal septum are hypokinetic. When directly compared to images from 7/5/2022 there has been a significant change in left ventricular systolic function.    LV Diastolic function: Unable to assess diastolic function due to E-A fusion. Average E/e' ratio is 11.    Right Ventricle: Normal right ventricular cavity size. Wall thickness is normal. Right ventricle wall motion  is normal. Systolic function is normal.    Left Atrium: Left atrium is severely dilated.    Aortic Valve: There is moderate to severe low gradient aortic stenosis. Aortic valve area by VTI is 0.97 cm². LVOT diameter is 2.4 cm. Aortic valve peak velocity is 3.24 m/s. Mean gradient is 28 mmHg. The dimensionless index is 0.21. There is no significant regurgitation.    Mitral Valve: There is mild to moderate regurgitation.    Tricuspid Valve: There is mild to moderate regurgitation.    Pulmonary Artery: The estimated pulmonary artery  systolic pressure is 38 mm Hg.    IVC/SVC: Low central venous pressure.    Pericardium: There is a very small circumferential effusion; slightly more fluid is located laterally.   Echo 12-23    Radiology findings      Subjective/Objective Respiratory Conditions      Recent/Current MI      Heart Transplant, LVAD      Edema, JVD      Ascites     x Diuretics/Meds IV Furosemide Mar 12-22    Other Treatment     x Other history CASTILLO cirrhosis s/p Liver Transplant, Aortic Stenosis, Type 2 Diabetes, Hypertension  H&P     Heart failure is a clinical diagnosis which includes symptomatic fluid retention, elevated intracardiac pressures, and/or the inability of the heart to deliver adequate blood flow.    Heart Failure with reduced Ejection Fraction (HFrEF) or Systolic Heart Failure (loses ability to contract normally, EF is <40%)    Heart Failure with preserved Ejection Fraction (HFpEF) or Diastolic Heart Failure (stiff ventricles, does not relax properly, EF is >50%)     Heart Failure with Combined Systolic and Diastolic Failure (stiff ventricles, does not relax properly and EF is <50%)    Mid-range or mildly reduced ejection fraction (HFmrEF) is classified as systolic heart failure.  Congestive heart failure with a recovered EF is classified as Diastolic Heart Failure.  Common clues to acute exacerbation:  Rapidly progressive symptoms (w/in 2 weeks of presentation), using IV diuretics, using supplemental O2, pulmonary edema on Xray, new or worsening pleural effusion, +JVD or other signs of volume overload, MI w/in 4 weeks, and/or BNP >500  The clinical guidelines noted are only system guidelines, and do not replace the providers clinical judgment.    Provider, please clarify the conflicting type and acuity of the chf diagnosis associated with the above clinical findings.    [  x ]  Acute Systolic Heart Failure (HFrEF or HFmrEF) - new diagnosis   [   ]  Acute on Chronic Systolic Heart Failure (HFrEF or HFmrEF) - worsening of  CHF signs/symptoms in preexisting CHF   [   ]  Acute Diastolic Heart Failure (HFpEF) - new diagnosis   [   ]  Other (please specify): ___________________________________       Please document in your progress notes daily for the duration of treatment until resolved and include in your discharge summary.    References:  American Heart Association editorial staff. (2017, May). Ejection Fraction Heart Failure Measurement. American Heart Association. https://www.heart.org/en/health-topics/heart-failure/diagnosing-heart-failure/ejection-fraction-heart-failure-measurement#:~:text=Ejection%20fraction%20(EF)%20is%20a,pushed%20out%20with%20each%20heartbeat  BO Boyce (2020, December 15). Heart failure with preserved ejection fraction: Clinical manifestations and diagnosis. [employer]. https://www.i[employer]er.com/contents/heart-failure-with-preserved-ejection-fraction-clinical-manifestations-and-diagnosis.  ICD-10-CM/PCS Coding Clinic Third Quarter ICD-10, Effective with discharges: September 8, 2020 [facility] [employer] § Heart failure with mid-range or mildly reduced ejection fraction (2020).  ICD-10-CM/PCS Coding Clinic Third Quarter ICD-10, Effective with discharges: September 8, 2020 [facility] [employer] § Heart failure with recovered ejection fraction (2020).  Form No. [id number]

## 2024-01-02 NOTE — DISCHARGE SUMMARY
[facility] - Intensive Care ([place] [address])  [facility] Medicine  Discharge Summary      Patient Name: Tej Purdy  MRN: [id number]  NORMA: [id number]  Patient Class: IP- Inpatient  Admission Date: 12/22/2023  Hospital Length of Stay: 10 days  Discharge Date and Time: 1/2/2024  6:14 PM  Attending Physician: Sae Pearl MD   Discharging Provider: Sae Pearl MD  Primary Care Provider: Thais Maxwell MD  [facility] Medicine Team: [facility] MED D Sae Pearl MD  Primary Care Team: 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 He lives in [place], [place]. He is . His hepatologist is Dr. Thais Maxwell.               He presented to [facility] Emergency Department on 12/22/2023 with fatigue and progressive dyspnea over the last 3 days. He had dyspnea with minimal exertion (walking 2 to 3 feet) and orthopnea. He could not slep on his side and had been getting up and sitting in his recliner. He had difficulty sleeping resulting in fatigue from insomnia.              Chest X-ray showed patchy lung opacities. BNP was 539 pg/mL. He was given 40 mg of intravenous furosemide. He urinated 900 mL while in the emergency department. He was admitted to Hospital Medicine Team D.      Procedure(s) (LRB):  Angiogram, Coronary, with Left Heart Cath (N/A)  Stent, Drug Eluting, Single Vessel, Coronary      Hospital  Course:   Echocardiogram showed severely reduced ejection fraction of 20 to 25%, moderate to severe low gradient aortic stenosis. Cardiology was consulted. He had Q waves on EKG. There was concern that he had a myocardial infarction. His amiodarone was discontinued. Interventional Cardiology was consulted. He was started on aspirin, a statin, and a beta-blocker. He underwent left heart catheterization via right radial artery showing 90% stenosis in the lateral circumflex artery. A stent was placed. He was started on dual antiplatelet therapy and isosorbide mononitrate. On 12/27/2023, he started having dizziness after taking his morning medications. Blood pressures were as low as 90s/50s while supine. The same happened the next morning, even without furosemide. Isosorbide mononitrate was stopped. He was orthostatic. Isosorbide mononitrate was stopped. He was given 250 mL of normal saline. He also developed new right lower quadrant abdominal pain that felt similar to prior nephrolithiasis. CT showed bilateral nephrolithiasis without evidence of obstruction. He continued to feel lightheaded with low blood pressures when standing. He was given 500 mL of normal saline. He also had shortness of breath and became hypoxic. Repeat chest X-ray showed nothing new. BNP was higher at 743 pg/mL, but his net fluid status was negative as he had produced more urine in relation to the small amounts of intravenous fluids he was given. D-dimer was elevated at 1.86 mg/L. Chest CTA showed no pulmonary embolism, instead showing interstitial pulmonary edema and trace bilateral pleural effusions and enlarged main pulmonary artery suggesting pulmonary hypertension. Metoprolol dose was decreased. He was put on midodrine, which improved supine blood pressure to 140/83, with standing blood pressure of 105/55. He still felt lightheadedness. He was still hypoxic. Midodrine dose was increased and diuresis with furosemide was resumed. He developed  right second toe pain and swelling at the toenail the night of 12/30/2023. Topical mupirocin was applied. He had a temperature of 100.8° Fahrenheit on 1/1/2024. He felt well and had no new symptoms. On the contrary, his toe pain improved. On 1/3/2024 he was taken off supplemental oxygen and felt well enough to go home.     Goals of Care Treatment Preferences:  Code Status: Full Code      Consults:   Consults (From admission, onward)          Status Ordering Provider     Inpatient consult to Interventional Cardiology  Once        Provider:  (Not yet assigned)    Completed JESSIE LOPEZ     Inpatient consult to Cardiology  Once        Provider:  (Not yet assigned)    Completed JUAN GONCALVES     Inpatient consult to Hepatology  Once        Provider:  (Not yet assigned)    Completed ALMA LESTER              Final Active Diagnoses:    Diagnosis Date Noted POA    Nephrolithiasis [N20.0] 12/28/2023 No    Orthostatic dizziness [R42] 12/27/2023 No    Presence of drug-eluting stent in left circumflex coronary artery [Z95.5] 12/26/2023 Not Applicable    Unstable angina [I20.0] 12/25/2023 Yes    HFrEF (heart failure with reduced ejection fraction) [I50.20] 12/24/2023 No     Chronic    Nonrheumatic aortic valve stenosis [I35.0]  Yes     Chronic    Anemia due to unknown mechanism [D64.9] 02/02/2022 Yes     Chronic    S/P liver transplant [Z94.4] 01/06/2022 Not Applicable     Chronic    Persistent atrial fibrillation [I48.19] 05/14/2021 Yes     Chronic    Coronary artery disease involving native coronary artery of native heart without angina pectoris [I25.10] 01/25/2021 No     Chronic    Type 2 diabetes mellitus, with long-term current use of insulin [E11.9, Z79.4] 01/19/2021 Not Applicable     Chronic    Primary hypertension [I10] 11/23/2020 Yes     Chronic      Problems Resolved During this Admission:    Diagnosis Date Noted Date Resolved POA    PRINCIPAL PROBLEM:  Acute diastolic (congestive) heart failure  [I50.31] 12/22/2023 12/24/2023 Yes    Pain in toe of right foot [M79.674] 12/31/2023 01/02/2024 No    Hypoxia [R09.02] 12/29/2023 01/02/2024 No       Discharged Condition: good    Disposition: Home or Self Care    Follow Up:   Follow-up Information       [facility] - 3rd Fl Follow up in 2 week(s).    Specialty: Cardiology  Contact information:  [address]  [facility] [address] [phone]  Additional information:  Cardiology Services Clinics - 3rd floor                         Patient Instructions:      Diet Cardiac     Notify your health care provider if you experience any of the following:  difficulty breathing or increased cough     Notify your health care provider if you experience any of the following:  persistent dizziness, light-headedness, or visual disturbances     Cardiac rehab phase II   Standing Status: Future Standing Exp. Date: 12/26/24     Order Specific Question Answer Comments   Department [facility] CARDIAC REHAB    Select qualifying diagnosis: Z98.61 - Coronary angioplasty status      Activity as tolerated       Significant Diagnostic Studies:   X-Ray Chest AP Portable 12/22/23: FINDINGS:   Monitoring leads overlie the chest.  Patient is rotated.  Resolution is limited by body habitus with underpenetration.   Cardiomediastinal silhouette is midline and prominent similar to prior.  Scattered linear opacities throughout the lungs consistent with minimal platelike scarring versus atelectasis.  Few scattered new subtle patchy subsegmental opacities at the bilateral mid to lower lung zones.  The lungs are otherwise well expanded without consolidation, pleural effusion or pneumothorax.  No acute osseous process seen.  PA and lateral views can be obtained.   Impression:  Bilateral mid to lower lung zone new patchy subtle opacities which may reflect subsegmental atelectasis versus multifocal airspace process including aspiration or pneumonia in the proper clinical setting.   Consider short-term follow-up chest radiography after therapy to ensure resolution.   US Doppler Liver Transplant Post 12/23/23: FINDINGS:   Patient is status post orthotopic liver transplant 01/06/2022.  The liver demonstrates slightly heterogeneous echotexture, but to a lesser degree from prior.  No focal hepatic lesions are seen.  No fluid collections.   The common duct is not dilated, measuring 3 mm, previously 4 mm.  Mildly dilated intrahepatic ducts within left hepatic lobe, new from prior.   Color flow and spectral waveform analysis was performed.  The main portal vein, right portal vein, left portal vein, middle hepatic vein, right hepatic vein, left hepatic vein, and IVC are patent with proper directional flow.   Main portal vein velocity measures up to 27 cm/sec, previously 35 cm/sec.   IVC velocity measures 94 cm/sec, previously 150 cm/sec.   Anastomosis site of the main hepatic artery demonstrates a peak systolic velocity 91 cm/sec, previously 94 cm/s.   Main hepatic artery demonstrates resistive index 0.73 with normal waveform, previously 0.73.   Left hepatic artery shows resistive index 0.62 with normal waveform, previously 0.59.   Anterior branch of the right hepatic artery demonstrates resistive index 0.56 with normal waveform, previously 0.69.   Posterior branch of the right hepatic artery demonstrates resistive index 0.74 with normal waveform, previously 0.68.   Bilateral pleural effusions.  Trace volume free fluid within the left lower quadrant.   Impression:  Satisfactory Doppler evaluation of the liver.   New mild left hepatic lobe intrahepatic ductal dilatation.   Bilateral pleural effusions.   Trace ascites within left lower quadrant.   Transthoracic echo complete 12/23/23:     Left Ventricle: The left ventricle is normal in size. Mildly increased wall thickness. There is concentric remodeling. There is severely reduced systolic function with a visually estimated ejection fraction of 20 - 25%.  All walls except for the basal septum are hypokinetic. When directly compared to images from 7/5/2022 there has been a significant change in left ventricular systolic function.    LV Diastolic function: Unable to assess diastolic function due to E-A fusion. Average E/e' ratio is 11.    Right Ventricle: Normal right ventricular cavity size. Wall thickness is normal. Right ventricle wall motion  is normal. Systolic function is normal.    Left Atrium: Left atrium is severely dilated.    Aortic Valve: There is moderate to severe low gradient aortic stenosis. Aortic valve area by VTI is 0.97 cm². LVOT diameter is 2.4 cm. Aortic valve peak velocity is 3.24 m/s. Mean gradient is 28 mmHg. The dimensionless index is 0.21. There is no significant regurgitation.    Mitral Valve: There is mild to moderate regurgitation.    Tricuspid Valve: There is mild to moderate regurgitation.    Pulmonary Artery: The estimated pulmonary artery systolic pressure is 38 mm Hg.    IVC/SVC: Low central venous pressure.    Pericardium: There is a very small circumferential effusion; slightly more fluid is located laterally.  Left heart catheterization 12/26/23:    The 1st Mrg lesion was 90% stenosed with 0% stenosis post-intervention.    1st Mrg lesion: A .    1st Mrg lesion: A STENT SYNERGY XD 3.0X24MM .    The estimated blood loss was none.    There was single vessel coronary artery disease.    The PCI was successful.  CT Renal Stone Study Abd Pelvis WO 12/28/23: FINDINGS:   CHEST:   Lungs/Pleura: Bibasilar subsegmental atelectasis versus scarring.   No focal consolidation, pneumothorax, or pleural effusion is present.   Heart: The visualized portions of the heart are normal. No significant pericardial effusion.  Dense multi-vessel coronary atherosclerotic plaque.   Thoracic soft tissues: Unremarkable   ABDOMEN:   Liver: Postoperative changes of liver transplantation.  No focal hepatic abnormality.   Gallbladder/Bile ducts: Gallbladder  surgically absent.  No extrahepatic biliary ductal dilatation.   Spleen:Mildly enlarged, measuring 13.6 cm.  Perisplenic collateral vessels present.  Punctate calcified granuloma.   Stomach: Unremarkable.   Pancreas: Few calcifications, which may indicate prior pancreatitis.  No ductal dilatation, mass, or peripancreatic fat stranding.   Adrenals: Stable 1.2 cm left adrenal nodule.   Renal/Ureters: The kidneys are normal in size and location. No hydronephrosis. Stable bilateral cysts, the largest measuring 1.8 cm on the right and 3.4 cm on the left.  Few nonobstructive nephroliths bilaterally.  No evidence of ureteral dilatation or obstruction.  No ureterolithiasis.  Moderate plaque burden in the bilateral renal arteries.  The ureters are normal in course and caliber. The urinary bladder is unremarkable.   Reproductive: Dystrophic prostatic calcifications.   Bowel: The visualized loops of small and large bowel show no evidence of obstruction or inflammation.  Appendix is not visualized, appendectomy June 2023 per chart review.   Peritoneum: Small amount of pelvic and perihepatic free fluid.  No intraperitoneal free air.   Lymph Nodes: No pathologic bird enlargement in the abdomen or pelvis.   Vasculature: The abdominal aorta is normal in course and caliber.  Dense aortoiliac atherosclerotic calcifications.   Bones: Degenerative changes of the spine.  No acute fractures or osseous destructive lesions.  Soft Tissues: the extraperitoneal soft tissues are unremarkable.   X-Ray Chest AP Portable 12/29/23: FINDINGS:   Mild basilar reticular opacities suggesting either scarring or mild atelectasis.  No new focal consolidation, large effusion or pneumothorax.  No radiographic evidence of significant cardiac decompensation.  Cardiac silhouette is stable in size.  There are aortic calcifications.  Osseous structures show no acute abnormalities.   Impression:  No acute radiographic findings on single view of the chest.  No  significant detrimental changes from prior.   CTA Chest Non-Coronary (PE Studies) 12/30/23: FINDINGS:   LINES/TUBES: None.   SOFT TISSUES: No axillary or subpectoral lymphadenopathy. The visualized thyroid gland is unremarkable.   HEART AND MEDIASTINUM: No mediastinal or hilar lymphadenopathy. Heart and pericardium are within normal limits. Calcifications of the aortic valve, coronary arteries, thoracic aorta, and arch vessels.  The main pulmonary artery is enlarged measuring 3.3 cm.  No filling defects in the pulmonary arteries to suggest thromboembolism.   PLEURA: Trace pleural effusions bilaterally.  No pneumothorax.   LUNGS AND AIRWAYS: Airways are patent.  Diffuse bronchial wall thickening.  Subsegmental atelectasis in the dependent lungs adjacent to the pleural effusions.  No focal consolidation.  There is smooth interlobular septal thickening throughout both lungs.   UPPER ABDOMEN: Postoperative changes from liver transplant.  The gallbladder is surgically absent.  Splenomegaly measuring 13.9 cm.  Small calcified splenic granuloma.  Parenchymal calcifications in the pancreas suggesting chronic pancreatitis.  Bilateral renal cysts.  Trace perihepatic ascites.   BONES: Remote fractures involving the right humerus and multiple right-sided ribs.  There is a nondisplaced fracture involving the right anterolateral 6th rib (2:344), which is new from 09/18/2023 but demonstrates callus formation suggesting subacute injury.  No aggressive osseous lesion.   Impression:  No pulmonary thromboembolism.   Interstitial pulmonary edema and trace bilateral pleural effusions.   Enlarged main pulmonary artery suggesting pulmonary hypertension.   Other findings as described.       Medications:  Reconciled Home Medications:      Medication List        START taking these medications      atorvastatin 80 MG tablet  Commonly known as: LIPITOR  Take 1 tablet (80 mg total) by mouth once daily.  Start taking on: January 3, 2024    "  clopidogreL 75 mg tablet  Commonly known as: PLAVIX  Take 1 tablet (75 mg total) by mouth once daily.  Start taking on: January 3, 2024     metoprolol succinate 25 MG 24 hr tablet  Commonly known as: TOPROL-XL  Take 0.5 tablets (12.5 mg total) by mouth once daily.  Start taking on: January 3, 2024     midodrine 10 MG tablet  Commonly known as: PROAMATINE  Take 1 tablet (10 mg total) by mouth 3 (three) times daily with meals.            CONTINUE taking these medications      aspirin 81 MG EC tablet  Commonly known as: ECOTRIN  Take 81 mg by mouth once daily.     BD ULTRA-FINE GÉNESIS PEN NEEDLE 32 gauge x 5/32" Ndle  Generic drug: pen needle, diabetic  Use to inject insulin 4 times daily     blood sugar diagnostic Strp  To check BG 2 times daily, to use with insurance preferred meter (patient has a [employer] self-monitoring glucose meter)     blood-glucose meter kit  To check BG 2 times daily, to use with insurance preferred meter     buPROPion 150 MG TB24 tablet  Commonly known as: WELLBUTRIN XL  Take 1 tablet (150 mg total) by mouth every morning.     calcium carbonate-vitamin D3 600 mg-20 mcg (800 unit) Tab  Take 1 tablet by mouth once daily.     ELIQUIS 5 mg Tab  Generic drug: apixaban  Take 1 tablet (5 mg total) by mouth 2 (two) times daily.     fish oil-omega-3 fatty acids 300-1,000 mg capsule  Take 1 capsule by mouth once daily.     gabapentin 300 MG capsule  Commonly known as: NEURONTIN  Take 1 capsule (300 mg total) by mouth 3 (three) times daily as needed (neuropathy).     HumaLOG KwikPen Insulin 100 unit/mL pen  Generic drug: insulin lispro  Inject 6 Units into the skin 3 (three) times daily with meals. Plus sliding scale, MDD: 48 units     lancets Misc  To check BG 2 times daily, to use with insurance preferred meter     LANTUS SOLOSTAR U-100 INSULIN glargine 100 units/mL SubQ pen  Generic drug: insulin  INJECT 22 UNITS INTO THE SKIN EVERY EVENING. ADJUST DOSE UNTIL AM GLUCOSE GOAL . MAX DOSE 30 " UNITS/DY     magnesium oxide 400 mg (241.3 mg magnesium) tablet  Commonly known as: MAG-OX  Take 2 tablets (800 mg total) by mouth 3 (three) times daily.     multivitamin capsule  Take 1 capsule by mouth once daily.     ondansetron 4 MG Tbdl  Commonly known as: ZOFRAN-ODT  Dissolve 1 tablet (4 mg total) by mouth every 8 (eight) hours as needed (nausea).     pantoprazole 40 MG tablet  Commonly known as: PROTONIX  Take 1 tablet (40 mg total) by mouth once daily.     QUEtiapine 100 MG Tab  Commonly known as: SEROQUEL  Take 1 tablet (100 mg total) by mouth every evening.     sertraline 25 MG tablet  Commonly known as: ZOLOFT  Take 1 tablet (25 mg total) by mouth once daily.     tacrolimus 0.5 MG Cap  Commonly known as: PROGRAF  Take 2 capsules (1 mg total) by mouth every 12 (twelve) hours.            STOP taking these medications      amiodarone 200 MG Tab  Commonly known as: PACERONE     amLODIPine 10 MG tablet  Commonly known as: NORVASC              Indwelling Lines/Drains at time of discharge: None    Time spent on the discharge of patient: 38 minutes         Sae Pearl MD  Department of Hospital Medicine  [facility] - Intensive Care ([place])

## 2024-01-02 NOTE — TREATMENT PLAN
Hepatology Treatment Plan    Tej Purdy is a 68 y.o. male admitted to hospital 2023 (Hospital Day: 12) due to Acute diastolic (congestive) heart failure.     Interval History  No acute events overnight.  Vital signs stable, afebrile.    Prograf level 3.9 this a.m..  Transaminases normal.  Creatinine 1.2.    Objective  Temp:  [98.3 °F (36.8 °C)-100.8 °F (38.2 °C)] 98.4 °F (36.9 °C) (1204)  Pulse:  [77-91] 86 (1204)  BP: (102-149)/(57-77) 132/71 (1204)  Resp:  [17-18] 18 (1204)  SpO2:  [96 %-98 %] 96 % (1204)        Laboratory    Lab Results   Component Value Date    WBC 8.16 2024    HGB 8.3 (L) 2024    HCT 30.4 (L) 2024    MCV 69 (L) 2024     2024       Lab Results   Component Value Date     2024    K 4.0 2024     2024    CO2 26 2024    BUN 26 (H) 2024    CREATININE 1.2 2024    CALCIUM 8.8 2024       Lab Results   Component Value Date    ALBUMIN 3.4 (L) 2024    ALBUMIN 3.4 (L) 2024    ALT 9 (L) 2024    ALT 9 (L) 2024    AST 15 2024    AST 15 2024    GGT 48 2021    ALKPHOS 74 2024    ALKPHOS 74 2024    BILITOT 0.7 2024    BILITOT 0.7 2024       Lab Results   Component Value Date    INR 1.3 (H) 2023    INR 1.2 2022    INR 1.2 2022       MELD 3.0: 14 at 6/3/2023  4:18 AM  MELD-Na: 14 at 6/3/2023  4:18 AM  Calculated from:  Serum Creatinine: 1.4 mg/dL at 6/3/2023  4:18 AM  Serum Sodium: 136 mmol/L at 6/3/2023  4:18 AM  Total Bilirubin: 0.5 mg/dL (Using min of 1 mg/dL) at 6/3/2023  4:18 AM  Serum Albumin: 2.7 g/dL at 6/3/2023  4:18 AM  INR(ratio): 1.3 at 2023 11:09 PM  Age at listin years  Sex: Male at 6/3/2023  4:18 AM      Assessment  Tej Purdy is a 68 y.o. male with history of history of cholangiocarcinoma (status post liver transplant in 2022),  Aortic Stenosis, Type 2 Diabetes,  Hypertension presents to the ED with CHF exacerbation (EF 20-25%, moderate to severe AS). Labs show excellent allograft function on prograf monotherapy. Patient reports good compliance. Follows up with Dr. Maxwell in clinic.  Hepatology consulted for IS management. Doppler evaluation of liver was satisfactory with new mild intrahepatic ductal dilation in the left hepatic lobe; trace ascites in LLQ. CA 19-9 and CEA within normal limits. Decrease Prograf from 1 mg BID to 0.5 mg BID on 12/24. Underwent LHC on 12/26 for newly reduced EF. Severe CAD in Lcx, underwent PCI. Now on triple therapy for one month than plavix/eliquis for one year.       Problem List:  Cholangiocarcinoma (status post liver transplant in 01/2022)  On chronic immunosuppression      Plan  - Continue Prograf 0.5 mg BID at discharge  - Will arrange for outpatient lab check next week  - Has follow up with Dr. Maxwell in one month  - Plan of care was discussed with primary team      Thank you for involving us in the care of Tej Purdy. Please call with any additional concerns or questions.    Radames Lam MD  Gastroenterology and Hepatology Fellow, PGY-V

## 2024-01-03 NOTE — PLAN OF CARE
[facility] ([facility]  Discharge Final Note    Primary Care Provider: Thais Maxwell MD    Expected Discharge Date: 1/2/2024    Final Discharge Note (most recent)       Final Note - 01/03/24 0817          Final Note    Assessment Type Final Discharge Note (P)      Anticipated Discharge Disposition Home or Self Care (P)         Post-Acute Status    Coverage PHN (P)      Discharge Delays None known at this time (P)                      Important Message from Medicare             Contact Info       [facility]iology - 3rd Fl   Specialty: Cardiology    [address]  [facility] [address]   Phone: [phone]       Next Steps: Follow up in 2 week(s)        Pt d/c'd to home.    POLLY ÁlvarezN, BS, RN, CCM

## 2024-01-04 DIAGNOSIS — Z94.4 S/P LIVER TRANSPLANT: ICD-10-CM

## 2024-01-04 RX ORDER — TACROLIMUS 0.5 MG/1
0.5 CAPSULE ORAL EVERY 12 HOURS
Qty: 60 CAPSULE | Refills: 11 | Status: SHIPPED | OUTPATIENT
Start: 2024-01-04

## 2024-01-08 ENCOUNTER — LAB VISIT (OUTPATIENT)
Dept: LAB | Facility: HOSPITAL | Age: 69
End: 2024-01-08
Attending: INTERNAL MEDICINE
Payer: MEDICARE

## 2024-01-08 ENCOUNTER — TELEPHONE (OUTPATIENT)
Dept: CARDIAC REHAB | Facility: CLINIC | Age: 69
End: 2024-01-08
Payer: MEDICARE

## 2024-01-08 DIAGNOSIS — Z94.4 S/P LIVER TRANSPLANT: ICD-10-CM

## 2024-01-08 LAB
ALBUMIN SERPL BCP-MCNC: 3.7 G/DL (ref 3.5–5.2)
ALP SERPL-CCNC: 86 U/L (ref 55–135)
ALT SERPL W/O P-5'-P-CCNC: 11 U/L (ref 10–44)
ANION GAP SERPL CALC-SCNC: 9 MMOL/L (ref 8–16)
AST SERPL-CCNC: 17 U/L (ref 10–40)
BASOPHILS # BLD AUTO: 0.1 K/UL (ref 0–0.2)
BASOPHILS NFR BLD: 1 % (ref 0–1.9)
BILIRUB SERPL-MCNC: 0.8 MG/DL (ref 0.1–1)
BUN SERPL-MCNC: 21 MG/DL (ref 8–23)
CALCIUM SERPL-MCNC: 9.1 MG/DL (ref 8.7–10.5)
CHLORIDE SERPL-SCNC: 105 MMOL/L (ref 95–110)
CO2 SERPL-SCNC: 25 MMOL/L (ref 23–29)
CREAT SERPL-MCNC: 1.1 MG/DL (ref 0.5–1.4)
DIFFERENTIAL METHOD BLD: ABNORMAL
EOSINOPHIL # BLD AUTO: 0.2 K/UL (ref 0–0.5)
EOSINOPHIL NFR BLD: 2.3 % (ref 0–8)
ERYTHROCYTE [DISTWIDTH] IN BLOOD BY AUTOMATED COUNT: 19.9 % (ref 11.5–14.5)
EST. GFR  (NO RACE VARIABLE): >60 ML/MIN/1.73 M^2
GLUCOSE SERPL-MCNC: 166 MG/DL (ref 70–110)
HCT VFR BLD AUTO: 35.7 % (ref 40–54)
HGB BLD-MCNC: 9.3 G/DL (ref 14–18)
IMM GRANULOCYTES # BLD AUTO: 0.04 K/UL (ref 0–0.04)
IMM GRANULOCYTES NFR BLD AUTO: 0.4 % (ref 0–0.5)
LYMPHOCYTES # BLD AUTO: 1.5 K/UL (ref 1–4.8)
LYMPHOCYTES NFR BLD: 14.6 % (ref 18–48)
MCH RBC QN AUTO: 18.8 PG (ref 27–31)
MCHC RBC AUTO-ENTMCNC: 26.1 G/DL (ref 32–36)
MCV RBC AUTO: 72 FL (ref 82–98)
MONOCYTES # BLD AUTO: 1.1 K/UL (ref 0.3–1)
MONOCYTES NFR BLD: 10.5 % (ref 4–15)
NEUTROPHILS # BLD AUTO: 7.2 K/UL (ref 1.8–7.7)
NEUTROPHILS NFR BLD: 71.2 % (ref 38–73)
NRBC BLD-RTO: 0 /100 WBC
PLATELET # BLD AUTO: 220 K/UL (ref 150–450)
PMV BLD AUTO: ABNORMAL FL (ref 9.2–12.9)
POTASSIUM SERPL-SCNC: 4.4 MMOL/L (ref 3.5–5.1)
PROT SERPL-MCNC: 7.2 G/DL (ref 6–8.4)
RBC # BLD AUTO: 4.94 M/UL (ref 4.6–6.2)
SODIUM SERPL-SCNC: 139 MMOL/L (ref 136–145)
WBC # BLD AUTO: 10.04 K/UL (ref 3.9–12.7)

## 2024-01-08 PROCEDURE — 80053 COMPREHEN METABOLIC PANEL: CPT | Performed by: INTERNAL MEDICINE

## 2024-01-08 PROCEDURE — 36415 COLL VENOUS BLD VENIPUNCTURE: CPT | Mod: PN | Performed by: INTERNAL MEDICINE

## 2024-01-08 PROCEDURE — 80197 ASSAY OF TACROLIMUS: CPT | Performed by: INTERNAL MEDICINE

## 2024-01-08 PROCEDURE — 85025 COMPLETE CBC W/AUTO DIFF WBC: CPT | Performed by: INTERNAL MEDICINE

## 2024-01-09 LAB — TACROLIMUS BLD-MCNC: 9.4 NG/ML (ref 5–15)

## 2024-01-11 ENCOUNTER — PATIENT MESSAGE (OUTPATIENT)
Dept: TRANSPLANT | Facility: CLINIC | Age: 69
End: 2024-01-11
Payer: MEDICARE

## 2024-01-11 ENCOUNTER — TELEPHONE (OUTPATIENT)
Dept: TRANSPLANT | Facility: CLINIC | Age: 69
End: 2024-01-11
Payer: MEDICARE

## 2024-01-11 DIAGNOSIS — C22.1 CHOLANGIOCARCINOMA: ICD-10-CM

## 2024-01-11 DIAGNOSIS — Z94.4 S/P LIVER TRANSPLANT: Primary | ICD-10-CM

## 2024-01-11 NOTE — TELEPHONE ENCOUNTER
Sent patient message via portal to let him know labs are stable.  No medication changes, next labs due on 1/29/24      ----- Message from Thais Maxwell MD sent at 1/11/2024 12:34 AM CST -----  The Labs are stable; prograf level is high but not on a lot of prograf- recommend repeating labs in 2 weeks - please let patient know.

## 2024-01-17 DIAGNOSIS — I50.20 HEART FAILURE WITH REDUCED LEFT VENTRICULAR FUNCTION: Primary | ICD-10-CM

## 2024-01-17 DIAGNOSIS — E11.65 TYPE 2 DIABETES MELLITUS WITH HYPERGLYCEMIA, WITH LONG-TERM CURRENT USE OF INSULIN: ICD-10-CM

## 2024-01-17 DIAGNOSIS — E78.00 PURE HYPERCHOLESTEROLEMIA: ICD-10-CM

## 2024-01-17 DIAGNOSIS — Z79.4 TYPE 2 DIABETES MELLITUS WITH HYPERGLYCEMIA, WITH LONG-TERM CURRENT USE OF INSULIN: ICD-10-CM

## 2024-01-19 DIAGNOSIS — F32.A DEPRESSION, UNSPECIFIED DEPRESSION TYPE: ICD-10-CM

## 2024-01-19 RX ORDER — BUPROPION HYDROCHLORIDE 150 MG/1
150 TABLET ORAL EVERY MORNING
Qty: 30 TABLET | Refills: 0 | Status: SHIPPED | OUTPATIENT
Start: 2024-01-19 | End: 2024-02-14 | Stop reason: SDUPTHER

## 2024-01-22 ENCOUNTER — TELEPHONE (OUTPATIENT)
Dept: CARDIAC REHAB | Facility: CLINIC | Age: 69
End: 2024-01-22
Payer: MEDICARE

## 2024-01-22 NOTE — PROGRESS NOTES
Mr. Purdy is a patient of Dr. Tilley and was last seen in clinic 1/21/2022.      Subjective:   Patient ID:  Tej Purdy is a 68 y.o. male who presents for follow up of Atrial Fibrillation  .     HPI:    Mr. Purdy is a 68 y.o. male with HTN, HLD, DM2 , PAD, HCC s/p TACE/RFA (12/2020), CAD (PCI 12/2023) and persistent AF here for follow up.     Background:    OLT 1/6/2021  On 5/7/21, Mr. Purdy presented for RHC and pre-procedure ECG showed AF with RVR. He was relatively asymptomatic, noting some mild dizziness. He was subsequently started on metoprolol 25 mg ER daily and apixaban 5 mg bid. He was referred to EP.      At initial EP visit, ECG showed AF with RVR. Metoprolol was increased to 50 mg daily and ASHISH/DCCV recommended. On 5/19/21, Mr. Neumann underwent successful cardioversion and was started on flecainide 100 mg bid at discharge. Several days later on 5/24/21 he presented to the ED with SOB and HTN, resulting in flash pulmonary edema. He was started on lasix. [place] [facility] [place] ASHISH/DCCV. He had restoration of sinus rhythm and flecainide was increased to 150 mg bid. Approximately one week later, Mr. Neumann was seen in cardiology clinic where he was found to be hypotensive with NEVIN due to over diuresis.  He was sent to the ED where ECG showed AF. Flecainide and lasix were stopped.    6/16/2021 he did not notice any significant symptom improvement following DCCV and has continued to feel lightheaded and weak. Returned on 7/7/21 for ASHISH/DCCV. He had restoration of sinus rhythm and was then started on amiodarone with oral load. ECG at one week follow up showed recurrence of AF.   7/27/21 Mr. Purdy returned for cardioversion and had restoration of sinus rhythm.   He was continued on amiodarone and eliquis.      He underwent OLT on 1/6/21. Of note, path shows cholangiocarcinoma, not HCC as previously diagnosed. He's had ups and downs since then, but nothing c/w AF.    My interpretation of today's  ECG is NSR    Maintaining NSR on amio; continue for now.  On f/u in 6 mos, we'll consider continuing amio, vs. trying other options (e.g., trial of return to a 1C agent, or tikosyn, or PVI).    Update (01/24/2024):    12/26/2023:    The 1st Mrg lesion was 90% stenosed with 0% stenosis post-intervention.    1st Mrg lesion: A .    1st Mrg lesion: A STENT SYNERGY XD 3.0X24MM .    The estimated blood loss was none.    There was single vessel coronary artery disease.    The PCI was successful.    Amiodarone discontinued in hospital.    Today he says his SOB has improved somewhat since PCI but continues to have fatigue and SAMSON. No palps, CP, LH, syncope.     He is currently taking eliquis 5mg BID for stroke prophylaxis and denies significant bleeding episodes. He is currently being treated with toprol 12.5mg daily for HR control.  Kidney function is stable, with a creatinine of 1.1 on 1/8/2024.    I have personally reviewed the patient's EKG today, which shows possible AT/AFL with IVCD at 105bpm. QRS is 128. QTc is 536.    Relevant Cardiac Test Results:    2D Echo (12/23/2023):    Left Ventricle: The left ventricle is normal in size. Mildly increased wall thickness. There is concentric remodeling. There is severely reduced systolic function with a visually estimated ejection fraction of 20 - 25%. All walls except for the basal septum are hypokinetic. When directly compared to images from 7/5/2022 there has been a significant change in left ventricular systolic function.    LV Diastolic function: Unable to assess diastolic function due to E-A fusion. Average E/e' ratio is 11.    Right Ventricle: Normal right ventricular cavity size. Wall thickness is normal. Right ventricle wall motion  is normal. Systolic function is normal.    Left Atrium: Left atrium is severely dilated.    Aortic Valve: There is moderate to severe low gradient aortic stenosis. Aortic valve area by VTI is 0.97 cm². LVOT diameter is 2.4 cm. Aortic valve  peak velocity is 3.24 m/s. Mean gradient is 28 mmHg. The dimensionless index is 0.21. There is no significant regurgitation.    Mitral Valve: There is mild to moderate regurgitation.    Tricuspid Valve: There is mild to moderate regurgitation.    Pulmonary Artery: The estimated pulmonary artery systolic pressure is 38 mm Hg.    IVC/SVC: Low central venous pressure.    Pericardium: There is a very small circumferential effusion; slightly more fluid is located laterally.    Current Outpatient Medications   Medication Sig    apixaban (ELIQUIS) 5 mg Tab Take 1 tablet (5 mg total) by mouth 2 (two) times daily.    aspirin (ECOTRIN) 81 MG EC tablet Take 81 mg by mouth once daily.    atorvastatin (LIPITOR) 80 MG tablet Take 1 tablet (80 mg total) by mouth once daily.    blood sugar diagnostic Strp To check BG 2 times daily, to use with insurance preferred meter (patient has a [employer] self-monitoring glucose meter)    blood-glucose meter kit To check BG 2 times daily, to use with insurance preferred meter    buPROPion (WELLBUTRIN XL) 150 MG TB24 tablet Take 1 tablet (150 mg total) by mouth every morning.    calcium carbonate-vitamin D3 600 mg-20 mcg (800 unit) Tab Take 1 tablet by mouth once daily.    clopidogreL (PLAVIX) 75 mg tablet Take 1 tablet (75 mg total) by mouth once daily.    gabapentin (NEURONTIN) 300 MG capsule Take 1 capsule (300 mg total) by mouth 3 (three) times daily as needed (neuropathy).    insulin (LANTUS SOLOSTAR U-100 INSULIN) glargine 100 units/mL SubQ pen INJECT 22 UNITS INTO THE SKIN EVERY EVENING. ADJUST DOSE UNTIL AM GLUCOSE GOAL . MAX DOSE 30 UNITS/DY    insulin lispro (HUMALOG KWIKPEN INSULIN) 100 unit/mL pen Inject 6 Units into the skin 3 (three) times daily with meals. Plus sliding scale, MDD: 48 units    lancets Misc To check BG 2 times daily, to use with insurance preferred meter    magnesium oxide (MAG-OX) 400 mg (241.3 mg magnesium) tablet Take 2 tablets (800 mg total) by mouth 3  "(three) times daily.    metoprolol succinate (TOPROL-XL) 25 MG 24 hr tablet Take 0.5 tablets (12.5 mg total) by mouth once daily.    midodrine (PROAMATINE) 10 MG tablet Take 1 tablet (10 mg total) by mouth 3 (three) times daily with meals.    multivitamin capsule Take 1 capsule by mouth once daily.    omega-3 fatty acids/fish oil (FISH OIL-OMEGA-3 FATTY ACIDS) 300-1,000 mg capsule Take 1 capsule by mouth once daily.     ondansetron (ZOFRAN-ODT) 4 MG TbDL Dissolve 1 tablet (4 mg total) by mouth every 8 (eight) hours as needed (nausea).    pantoprazole (PROTONIX) 40 MG tablet Take 1 tablet (40 mg total) by mouth once daily.    pen needle, diabetic (BD ULTRA-FINE GÉNESIS PEN NEEDLE) 32 gauge x 5/32" Ndle Use to inject insulin 4 times daily    QUEtiapine (SEROQUEL) 100 MG Tab Take 1 tablet (100 mg total) by mouth every evening.    sertraline (ZOLOFT) 25 MG tablet Take 1 tablet (25 mg total) by mouth once daily.    tacrolimus (PROGRAF) 0.5 MG Cap Take 1 capsule (0.5 mg total) by mouth every 12 (twelve) hours.     No current facility-administered medications for this visit.     Facility-Administered Medications Ordered in Other Visits   Medication    sodium chloride 0.9% bolus 1,000 mL       Review of Systems   Constitutional: Positive for malaise/fatigue.   Cardiovascular:  Positive for dyspnea on exertion. Negative for chest pain, irregular heartbeat, leg swelling and palpitations.   Respiratory:  Positive for shortness of breath.    Hematologic/Lymphatic: Negative for bleeding problem.   Skin:  Negative for rash.   Musculoskeletal:  Negative for myalgias.   Gastrointestinal:  Negative for hematemesis, hematochezia and nausea.   Genitourinary:  Negative for hematuria.   Neurological:  Negative for light-headedness.   Psychiatric/Behavioral:  Negative for altered mental status.    Allergic/Immunologic: Negative for persistent infections.       Objective:        /64   Pulse 105   Ht 6' (1.829 m)   Wt 95.4 kg (210 lb " 5.1 oz)   BMI 28.52 kg/m²     Physical Exam  Vitals and nursing note reviewed.   Constitutional:       Appearance: Normal appearance. He is well-developed.   HENT:      Head: Normocephalic.      Nose: Nose normal.   Eyes:      Pupils: Pupils are equal, round, and reactive to light.   Cardiovascular:      Rate and Rhythm: Regular rhythm. Tachycardia present.   Pulmonary:      Effort: No respiratory distress.      Breath sounds: Normal breath sounds.   Musculoskeletal:         General: Normal range of motion.   Skin:     General: Skin is warm and dry.      Findings: No erythema.   Neurological:      Mental Status: He is alert and oriented to person, place, and time.   Psychiatric:         Speech: Speech normal.         Behavior: Behavior normal.           Lab Results   Component Value Date     01/08/2024    K 4.4 01/08/2024    MG 1.7 12/25/2023    BUN 21 01/08/2024    CREATININE 1.1 01/08/2024    ALT 11 01/08/2024    AST 17 01/08/2024    HGB 9.3 (L) 01/08/2024    HCT 35.7 (L) 01/08/2024    HCT 33 (L) 01/06/2022    TSH 2.223 12/22/2023    LDLCALC 65.2 09/19/2022       Recent Labs   Lab 01/12/22  0650 01/13/22  0635 02/01/22  1716 06/01/23  2309   INR 1.2 1.2 1.2 1.3 H       Assessment:     1. Persistent atrial fibrillation    2. HFrEF (heart failure with reduced ejection fraction)    3. Acute diastolic (congestive) heart failure    4. Aortic atherosclerosis    5. On anticoagulant therapy      Plan:     In summary, Mr. Purdy is a 68 y.o. male with HTN, HLD, DM2 , PAD, HCC s/p TACE/RFA (12/2020), CAD (PCI 12/2023) and persistent AF here for follow up.   He is 1 mo s/p hospitalization for CP. Concern for MI and amiodarone was discontinued. He underwent LHC and 1st Mrg lesion was 90% stenosed with 0% stenosis post-intervention. TTE showed EF 20-25% (was 60% in 2022).  Patient reports some symptom improvement since stent but continues to have fatigue and SAMSON. ECG reviewed with Dr. Tilley. Possible AT or AFL on ECG.  Will increase metoprolol and schedule pt for adenosine test and possible ASHISH/DCCV. Will likely restart amio afterwards. Pt remains on eliquis.    Increase toprol to 25mg daily  Adenosine test/ASHISH/DCCV schedule  RTC as scheduled after procedure      *A copy of this note has been sent to Dr. Tilley*    Follow up RTC as scheduled after procedure.    ------------------------------------------------------------------    NATE Maddox, NP-C  Cardiac Electrophysiology

## 2024-01-24 ENCOUNTER — HOSPITAL ENCOUNTER (OUTPATIENT)
Dept: CARDIOLOGY | Facility: CLINIC | Age: 69
Discharge: HOME | End: 2024-01-24
Payer: MEDICARE

## 2024-01-24 ENCOUNTER — OFFICE VISIT (OUTPATIENT)
Dept: ELECTROPHYSIOLOGY | Facility: CLINIC | Age: 69
End: 2024-01-24
Payer: MEDICARE

## 2024-01-24 VITALS
SYSTOLIC BLOOD PRESSURE: 105 MMHG | HEART RATE: 105 BPM | WEIGHT: 210.31 LBS | BODY MASS INDEX: 28.48 KG/M2 | DIASTOLIC BLOOD PRESSURE: 64 MMHG | HEIGHT: 72 IN

## 2024-01-24 DIAGNOSIS — Z79.01 ON ANTICOAGULANT THERAPY: ICD-10-CM

## 2024-01-24 DIAGNOSIS — I70.0 AORTIC ATHEROSCLEROSIS: ICD-10-CM

## 2024-01-24 DIAGNOSIS — I50.31 ACUTE DIASTOLIC (CONGESTIVE) HEART FAILURE: ICD-10-CM

## 2024-01-24 DIAGNOSIS — I49.8 OTHER SPECIFIED CARDIAC ARRHYTHMIAS: ICD-10-CM

## 2024-01-24 DIAGNOSIS — I49.8 OTHER SPECIFIED CARDIAC ARRHYTHMIAS: Primary | ICD-10-CM

## 2024-01-24 DIAGNOSIS — I50.20 HFREF (HEART FAILURE WITH REDUCED EJECTION FRACTION): ICD-10-CM

## 2024-01-24 DIAGNOSIS — I48.19 PERSISTENT ATRIAL FIBRILLATION: Primary | ICD-10-CM

## 2024-01-24 PROCEDURE — 93005 ELECTROCARDIOGRAM TRACING: CPT | Mod: S$GLB,,, | Performed by: INTERNAL MEDICINE

## 2024-01-24 PROCEDURE — 99999 PR PBB SHADOW E&M-EST. PATIENT-LVL IV: CPT | Mod: PBBFAC,,, | Performed by: NURSE PRACTITIONER

## 2024-01-24 PROCEDURE — 93010 ELECTROCARDIOGRAM REPORT: CPT | Mod: S$GLB,,, | Performed by: INTERNAL MEDICINE

## 2024-01-24 PROCEDURE — 99214 OFFICE O/P EST MOD 30 MIN: CPT | Mod: S$GLB,,, | Performed by: NURSE PRACTITIONER

## 2024-01-24 RX ORDER — METOPROLOL SUCCINATE 25 MG/1
25 TABLET, EXTENDED RELEASE ORAL DAILY
Qty: 30 TABLET | Refills: 11 | Status: SHIPPED | OUTPATIENT
Start: 2024-01-24 | End: 2024-02-14 | Stop reason: SDUPTHER

## 2024-01-24 NOTE — Clinical Note
Can we schedule patient for adenosine test and ASHISH/DCCV? Dr. Tilley would like this Friday if possible. Thanks!

## 2024-01-25 ENCOUNTER — TELEPHONE (OUTPATIENT)
Dept: ELECTROPHYSIOLOGY | Facility: CLINIC | Age: 69
End: 2024-01-25
Payer: MEDICARE

## 2024-01-25 ENCOUNTER — ANESTHESIA EVENT (OUTPATIENT)
Dept: MEDSURG UNIT | Facility: HOSPITAL | Age: 69
DRG: 308 | End: 2024-01-25
Payer: MEDICARE

## 2024-01-25 ENCOUNTER — PATIENT MESSAGE (OUTPATIENT)
Dept: ELECTROPHYSIOLOGY | Facility: CLINIC | Age: 69
End: 2024-01-25
Payer: MEDICARE

## 2024-01-25 ENCOUNTER — HOSPITAL ENCOUNTER (INPATIENT)
Facility: HOSPITAL | Age: 69
LOS: 1 days | Discharge: HOME | DRG: 308 | End: 2024-01-26
Attending: EMERGENCY MEDICINE | Admitting: STUDENT IN AN ORGANIZED HEALTH CARE EDUCATION/TRAINING PROGRAM
Payer: MEDICARE

## 2024-01-25 DIAGNOSIS — R06.02 SHORTNESS OF BREATH: ICD-10-CM

## 2024-01-25 DIAGNOSIS — I48.92 ATRIAL FLUTTER, UNSPECIFIED TYPE: Primary | ICD-10-CM

## 2024-01-25 DIAGNOSIS — I50.9 CONGESTIVE HEART FAILURE, UNSPECIFIED HF CHRONICITY, UNSPECIFIED HEART FAILURE TYPE: Primary | ICD-10-CM

## 2024-01-25 DIAGNOSIS — I48.92 ATRIAL FLUTTER, UNSPECIFIED TYPE: ICD-10-CM

## 2024-01-25 LAB
ALBUMIN SERPL BCP-MCNC: 3.6 G/DL (ref 3.5–5.2)
ALLENS TEST: ABNORMAL
ALP SERPL-CCNC: 146 U/L (ref 55–135)
ALT SERPL W/O P-5'-P-CCNC: 27 U/L (ref 10–44)
ANION GAP SERPL CALC-SCNC: 12 MMOL/L (ref 8–16)
AST SERPL-CCNC: 35 U/L (ref 10–40)
BASOPHILS # BLD AUTO: 0.08 K/UL (ref 0–0.2)
BASOPHILS NFR BLD: 0.6 % (ref 0–1.9)
BILIRUB SERPL-MCNC: 1.2 MG/DL (ref 0.1–1)
BNP SERPL-MCNC: 952 PG/ML (ref 0–99)
BUN SERPL-MCNC: 27 MG/DL (ref 8–23)
CALCIUM SERPL-MCNC: 8.9 MG/DL (ref 8.7–10.5)
CHLORIDE SERPL-SCNC: 106 MMOL/L (ref 95–110)
CO2 SERPL-SCNC: 21 MMOL/L (ref 23–29)
CREAT SERPL-MCNC: 1.1 MG/DL (ref 0.5–1.4)
DIFFERENTIAL METHOD BLD: ABNORMAL
EOSINOPHIL # BLD AUTO: 0.1 K/UL (ref 0–0.5)
EOSINOPHIL NFR BLD: 0.9 % (ref 0–8)
ERYTHROCYTE [DISTWIDTH] IN BLOOD BY AUTOMATED COUNT: 20.3 % (ref 11.5–14.5)
EST. GFR  (NO RACE VARIABLE): >60 ML/MIN/1.73 M^2
GLUCOSE SERPL-MCNC: 215 MG/DL (ref 70–110)
HCO3 UR-SCNC: 20.1 MMOL/L (ref 24–28)
HCT VFR BLD AUTO: 36 % (ref 40–54)
HGB BLD-MCNC: 9.7 G/DL (ref 14–18)
IMM GRANULOCYTES # BLD AUTO: 0.07 K/UL (ref 0–0.04)
IMM GRANULOCYTES NFR BLD AUTO: 0.6 % (ref 0–0.5)
INFLUENZA A, MOLECULAR: NEGATIVE
INFLUENZA B, MOLECULAR: NEGATIVE
LACTATE SERPL-SCNC: 1.8 MMOL/L (ref 0.5–2.2)
LYMPHOCYTES # BLD AUTO: 0.7 K/UL (ref 1–4.8)
LYMPHOCYTES NFR BLD: 5.6 % (ref 18–48)
MCH RBC QN AUTO: 19.4 PG (ref 27–31)
MCHC RBC AUTO-ENTMCNC: 26.9 G/DL (ref 32–36)
MCV RBC AUTO: 72 FL (ref 82–98)
MONOCYTES # BLD AUTO: 1.4 K/UL (ref 0.3–1)
MONOCYTES NFR BLD: 11.4 % (ref 4–15)
NEUTROPHILS # BLD AUTO: 10.1 K/UL (ref 1.8–7.7)
NEUTROPHILS NFR BLD: 80.9 % (ref 38–73)
NRBC BLD-RTO: 0 /100 WBC
PCO2 BLDA: 24.7 MMHG (ref 35–45)
PH SMN: 7.52 [PH] (ref 7.35–7.45)
PLATELET # BLD AUTO: 163 K/UL (ref 150–450)
PMV BLD AUTO: 11.1 FL (ref 9.2–12.9)
PO2 BLDA: 116 MMHG (ref 40–60)
POC BE: -3 MMOL/L
POC SATURATED O2: 99 % (ref 95–100)
POC TCO2: 21 MMOL/L (ref 24–29)
POTASSIUM SERPL-SCNC: 5 MMOL/L (ref 3.5–5.1)
PROCALCITONIN SERPL IA-MCNC: 0.04 NG/ML
PROT SERPL-MCNC: 7.4 G/DL (ref 6–8.4)
RBC # BLD AUTO: 5.01 M/UL (ref 4.6–6.2)
SAMPLE: ABNORMAL
SARS-COV-2 RDRP RESP QL NAA+PROBE: NEGATIVE
SITE: ABNORMAL
SODIUM SERPL-SCNC: 139 MMOL/L (ref 136–145)
SPECIMEN SOURCE: NORMAL
TROPONIN I SERPL DL<=0.01 NG/ML-MCNC: 0.02 NG/ML (ref 0–0.03)
WBC # BLD AUTO: 12.42 K/UL (ref 3.9–12.7)

## 2024-01-25 PROCEDURE — 99900035 HC TECH TIME PER 15 MIN (STAT)

## 2024-01-25 PROCEDURE — 96374 THER/PROPH/DIAG INJ IV PUSH: CPT

## 2024-01-25 PROCEDURE — 93010 ELECTROCARDIOGRAM REPORT: CPT | Mod: 76,,, | Performed by: INTERNAL MEDICINE

## 2024-01-25 PROCEDURE — 27000221 HC OXYGEN, UP TO 24 HOURS

## 2024-01-25 PROCEDURE — 94761 N-INVAS EAR/PLS OXIMETRY MLT: CPT

## 2024-01-25 PROCEDURE — 80053 COMPREHEN METABOLIC PANEL: CPT | Performed by: EMERGENCY MEDICINE

## 2024-01-25 PROCEDURE — 93010 ELECTROCARDIOGRAM REPORT: CPT | Mod: ,,, | Performed by: INTERNAL MEDICINE

## 2024-01-25 PROCEDURE — 12000002 HC ACUTE/MED SURGE SEMI-PRIVATE ROOM

## 2024-01-25 PROCEDURE — 84484 ASSAY OF TROPONIN QUANT: CPT | Performed by: EMERGENCY MEDICINE

## 2024-01-25 PROCEDURE — 93005 ELECTROCARDIOGRAM TRACING: CPT

## 2024-01-25 PROCEDURE — U0002 COVID-19 LAB TEST NON-CDC: HCPCS | Performed by: EMERGENCY MEDICINE

## 2024-01-25 PROCEDURE — 99285 EMERGENCY DEPT VISIT HI MDM: CPT | Mod: 25

## 2024-01-25 PROCEDURE — 83605 ASSAY OF LACTIC ACID: CPT

## 2024-01-25 PROCEDURE — 80048 BASIC METABOLIC PNL TOTAL CA: CPT | Mod: XB

## 2024-01-25 PROCEDURE — 25000003 PHARM REV CODE 250

## 2024-01-25 PROCEDURE — 83880 ASSAY OF NATRIURETIC PEPTIDE: CPT | Performed by: EMERGENCY MEDICINE

## 2024-01-25 PROCEDURE — 82803 BLOOD GASES ANY COMBINATION: CPT

## 2024-01-25 PROCEDURE — 87502 INFLUENZA DNA AMP PROBE: CPT | Performed by: EMERGENCY MEDICINE

## 2024-01-25 PROCEDURE — 82962 GLUCOSE BLOOD TEST: CPT

## 2024-01-25 PROCEDURE — 63600175 PHARM REV CODE 636 W HCPCS

## 2024-01-25 PROCEDURE — 85025 COMPLETE CBC W/AUTO DIFF WBC: CPT | Performed by: EMERGENCY MEDICINE

## 2024-01-25 PROCEDURE — 84145 PROCALCITONIN (PCT): CPT

## 2024-01-25 RX ORDER — SODIUM CHLORIDE 0.9 % (FLUSH) 0.9 %
10 SYRINGE (ML) INJECTION
Status: DISCONTINUED | OUTPATIENT
Start: 2024-01-25 | End: 2024-01-26 | Stop reason: HOSPADM

## 2024-01-25 RX ORDER — CLOPIDOGREL BISULFATE 75 MG/1
75 TABLET ORAL DAILY
Status: DISCONTINUED | OUTPATIENT
Start: 2024-01-26 | End: 2024-01-26 | Stop reason: HOSPADM

## 2024-01-25 RX ORDER — ATORVASTATIN CALCIUM 40 MG/1
80 TABLET, FILM COATED ORAL DAILY
Status: DISCONTINUED | OUTPATIENT
Start: 2024-01-26 | End: 2024-01-26 | Stop reason: HOSPADM

## 2024-01-25 RX ORDER — FUROSEMIDE 10 MG/ML
80 INJECTION INTRAMUSCULAR; INTRAVENOUS
Status: COMPLETED | OUTPATIENT
Start: 2024-01-25 | End: 2024-01-25

## 2024-01-25 RX ORDER — PANTOPRAZOLE SODIUM 40 MG/1
40 TABLET, DELAYED RELEASE ORAL DAILY
Status: DISCONTINUED | OUTPATIENT
Start: 2024-01-26 | End: 2024-01-26 | Stop reason: HOSPADM

## 2024-01-25 RX ORDER — MIDODRINE HYDROCHLORIDE 5 MG/1
10 TABLET ORAL
Status: DISCONTINUED | OUTPATIENT
Start: 2024-01-26 | End: 2024-01-26

## 2024-01-25 RX ORDER — FUROSEMIDE 10 MG/ML
40 INJECTION INTRAMUSCULAR; INTRAVENOUS 2 TIMES DAILY
Status: DISCONTINUED | OUTPATIENT
Start: 2024-01-26 | End: 2024-01-26 | Stop reason: HOSPADM

## 2024-01-25 RX ORDER — METOPROLOL SUCCINATE 25 MG/1
25 TABLET, EXTENDED RELEASE ORAL DAILY
Status: DISCONTINUED | OUTPATIENT
Start: 2024-01-26 | End: 2024-01-26 | Stop reason: HOSPADM

## 2024-01-25 RX ORDER — TACROLIMUS 0.5 MG/1
0.5 CAPSULE ORAL 2 TIMES DAILY
Status: DISCONTINUED | OUTPATIENT
Start: 2024-01-26 | End: 2024-01-26 | Stop reason: HOSPADM

## 2024-01-25 RX ADMIN — FUROSEMIDE 80 MG: 10 INJECTION, SOLUTION INTRAVENOUS at 08:01

## 2024-01-25 RX ADMIN — APIXABAN 5 MG: 5 TABLET, FILM COATED ORAL at 11:01

## 2024-01-25 NOTE — TELEPHONE ENCOUNTER
----- Message from Karey Jones MA sent at 1/25/2024  4:39 PM CST -----  The patient wife called she said he is having chest pain and she is bringing him to the er. Please call Aniya she would like to talk to you please call [phone]. Thank you

## 2024-01-25 NOTE — TELEPHONE ENCOUNTER
Spoke with pt, he misunderstood what the ASHISH that was on his appts was for, explained the ASHISH is done prior to the DCCV and that is why it is showing up on his appts, pt wanted to make sure this was not a stress test, reassured him it is not, spoke with the spouse and explained the procedure for tomorrow

## 2024-01-25 NOTE — TELEPHONE ENCOUNTER
Received direct call from spouse, reports she on her way to  the pt and bring him to the ER, she reports his is c/o chest pain and SOB, informed her that he should go to the ER for evaluation

## 2024-01-25 NOTE — TELEPHONE ENCOUNTER
----- Message from Teresa Rendon MA sent at 1/25/2024  2:31 PM CST -----  Contact: self  Pt is returning your call. Please call.  [phone]. Please call  ASAP. He's upset. Has procedure tomorrow Fri.1-26-24

## 2024-01-25 NOTE — Clinical Note
Diagnosis: Shortness of breath [786.05.ICD-9-CM]   Future Attending Provider: SILAS PENALOZA [[id number]]   Reason for IP Medical Treatment  (Clinical interventions that can only be accomplished in the IP setting? ) :: CHF exacerbation   I certify that Inpatient services for greater than or equal to 2 midnights are medically necessary:: Yes   Plans for Post-Acute care--if anticipated (pick the single best option):: A. No post acute care anticipated at this time   Special Needs:: No Special Needs [1]

## 2024-01-26 ENCOUNTER — ANESTHESIA (OUTPATIENT)
Dept: MEDSURG UNIT | Facility: HOSPITAL | Age: 69
DRG: 308 | End: 2024-01-26
Payer: MEDICARE

## 2024-01-26 VITALS
WEIGHT: 207 LBS | OXYGEN SATURATION: 96 % | HEART RATE: 77 BPM | SYSTOLIC BLOOD PRESSURE: 127 MMHG | BODY MASS INDEX: 28.07 KG/M2 | DIASTOLIC BLOOD PRESSURE: 62 MMHG | RESPIRATION RATE: 16 BRPM | TEMPERATURE: 98 F

## 2024-01-26 LAB
ALBUMIN SERPL BCP-MCNC: 3.4 G/DL (ref 3.5–5.2)
ALP SERPL-CCNC: 131 U/L (ref 55–135)
ALT SERPL W/O P-5'-P-CCNC: 22 U/L (ref 10–44)
ANION GAP SERPL CALC-SCNC: 8 MMOL/L (ref 8–16)
AST SERPL-CCNC: 21 U/L (ref 10–40)
BASOPHILS # BLD AUTO: 0.05 K/UL (ref 0–0.2)
BASOPHILS NFR BLD: 0.6 % (ref 0–1.9)
BILIRUB SERPL-MCNC: 1.1 MG/DL (ref 0.1–1)
BUN SERPL-MCNC: 25 MG/DL (ref 8–23)
CALCIUM SERPL-MCNC: 8.9 MG/DL (ref 8.7–10.5)
CHLORIDE SERPL-SCNC: 104 MMOL/L (ref 95–110)
CO2 SERPL-SCNC: 27 MMOL/L (ref 23–29)
CREAT SERPL-MCNC: 1.2 MG/DL (ref 0.5–1.4)
DIFFERENTIAL METHOD BLD: ABNORMAL
EOSINOPHIL # BLD AUTO: 0.1 K/UL (ref 0–0.5)
EOSINOPHIL NFR BLD: 1.6 % (ref 0–8)
ERYTHROCYTE [DISTWIDTH] IN BLOOD BY AUTOMATED COUNT: 19.9 % (ref 11.5–14.5)
EST. GFR  (NO RACE VARIABLE): >60 ML/MIN/1.73 M^2
ESTIMATED AVG GLUCOSE: 169 MG/DL (ref 68–131)
GLUCOSE SERPL-MCNC: 176 MG/DL (ref 70–110)
GLUCOSE SERPL-MCNC: 186 MG/DL (ref 70–110)
HBA1C MFR BLD: 7.5 % (ref 4–5.6)
HCT VFR BLD AUTO: 32.2 % (ref 40–54)
HGB BLD-MCNC: 8.7 G/DL (ref 14–18)
IMM GRANULOCYTES # BLD AUTO: 0.05 K/UL (ref 0–0.04)
IMM GRANULOCYTES NFR BLD AUTO: 0.6 % (ref 0–0.5)
LACTATE SERPL-SCNC: 1.4 MMOL/L (ref 0.5–2.2)
LYMPHOCYTES # BLD AUTO: 1 K/UL (ref 1–4.8)
LYMPHOCYTES NFR BLD: 11.8 % (ref 18–48)
MAGNESIUM SERPL-MCNC: 1.6 MG/DL (ref 1.6–2.6)
MCH RBC QN AUTO: 19 PG (ref 27–31)
MCHC RBC AUTO-ENTMCNC: 27 G/DL (ref 32–36)
MCV RBC AUTO: 71 FL (ref 82–98)
MONOCYTES # BLD AUTO: 1.1 K/UL (ref 0.3–1)
MONOCYTES NFR BLD: 12.5 % (ref 4–15)
NEUTROPHILS # BLD AUTO: 6.4 K/UL (ref 1.8–7.7)
NEUTROPHILS NFR BLD: 72.9 % (ref 38–73)
NRBC BLD-RTO: 0 /100 WBC
PHOSPHATE SERPL-MCNC: 2.9 MG/DL (ref 2.7–4.5)
PLATELET # BLD AUTO: 167 K/UL (ref 150–450)
PMV BLD AUTO: ABNORMAL FL (ref 9.2–12.9)
POTASSIUM SERPL-SCNC: 3.9 MMOL/L (ref 3.5–5.1)
PROT SERPL-MCNC: 6.7 G/DL (ref 6–8.4)
RBC # BLD AUTO: 4.57 M/UL (ref 4.6–6.2)
SODIUM SERPL-SCNC: 139 MMOL/L (ref 136–145)
TACROLIMUS BLD-MCNC: 7.8 NG/ML (ref 5–15)
WBC # BLD AUTO: 8.78 K/UL (ref 3.9–12.7)

## 2024-01-26 PROCEDURE — 80197 ASSAY OF TACROLIMUS: CPT

## 2024-01-26 PROCEDURE — 83735 ASSAY OF MAGNESIUM: CPT

## 2024-01-26 PROCEDURE — 92960 CARDIOVERSION ELECTRIC EXT: CPT | Mod: ,,, | Performed by: INTERNAL MEDICINE

## 2024-01-26 PROCEDURE — 36620 INSERTION CATHETER ARTERY: CPT | Mod: 59,,, | Performed by: ANESTHESIOLOGY

## 2024-01-26 PROCEDURE — 37000008 HC ANESTHESIA 1ST 15 MINUTES: Performed by: INTERNAL MEDICINE

## 2024-01-26 PROCEDURE — 83036 HEMOGLOBIN GLYCOSYLATED A1C: CPT

## 2024-01-26 PROCEDURE — 37000009 HC ANESTHESIA EA ADD 15 MINS: Performed by: INTERNAL MEDICINE

## 2024-01-26 PROCEDURE — 93005 ELECTROCARDIOGRAM TRACING: CPT

## 2024-01-26 PROCEDURE — 63600175 PHARM REV CODE 636 W HCPCS

## 2024-01-26 PROCEDURE — D9220A PRA ANESTHESIA: Mod: ANES,,, | Performed by: ANESTHESIOLOGY

## 2024-01-26 PROCEDURE — 83605 ASSAY OF LACTIC ACID: CPT

## 2024-01-26 PROCEDURE — 25000003 PHARM REV CODE 250

## 2024-01-26 PROCEDURE — 80053 COMPREHEN METABOLIC PANEL: CPT

## 2024-01-26 PROCEDURE — 5A2204Z RESTORATION OF CARDIAC RHYTHM, SINGLE: ICD-10-PCS | Performed by: INTERNAL MEDICINE

## 2024-01-26 PROCEDURE — 25000003 PHARM REV CODE 250: Performed by: NURSE ANESTHETIST, CERTIFIED REGISTERED

## 2024-01-26 PROCEDURE — 92960 CARDIOVERSION ELECTRIC EXT: CPT | Performed by: INTERNAL MEDICINE

## 2024-01-26 PROCEDURE — 84100 ASSAY OF PHOSPHORUS: CPT

## 2024-01-26 PROCEDURE — 93010 ELECTROCARDIOGRAM REPORT: CPT | Mod: ,,, | Performed by: INTERNAL MEDICINE

## 2024-01-26 PROCEDURE — D9220A PRA ANESTHESIA: Mod: CRNA,,, | Performed by: NURSE ANESTHETIST, CERTIFIED REGISTERED

## 2024-01-26 PROCEDURE — 85025 COMPLETE CBC W/AUTO DIFF WBC: CPT

## 2024-01-26 RX ORDER — MIDODRINE HYDROCHLORIDE 10 MG/1
5 TABLET ORAL
Qty: 45 TABLET | Refills: 11 | Status: ACTIVE | OUTPATIENT
Start: 2024-01-26 | End: 2024-02-01 | Stop reason: HOSPADM

## 2024-01-26 RX ORDER — GLUCAGON 1 MG
1 KIT INJECTION
Status: DISCONTINUED | OUTPATIENT
Start: 2024-01-26 | End: 2024-01-26 | Stop reason: HOSPADM

## 2024-01-26 RX ORDER — INSULIN ASPART 100 [IU]/ML
0-5 INJECTION, SOLUTION INTRAVENOUS; SUBCUTANEOUS
Status: DISCONTINUED | OUTPATIENT
Start: 2024-01-26 | End: 2024-01-26 | Stop reason: HOSPADM

## 2024-01-26 RX ORDER — LIDOCAINE HYDROCHLORIDE 20 MG/ML
SOLUTION OROPHARYNGEAL
Status: DISCONTINUED | OUTPATIENT
Start: 2024-01-26 | End: 2024-01-26

## 2024-01-26 RX ORDER — IBUPROFEN 200 MG
24 TABLET ORAL
Status: DISCONTINUED | OUTPATIENT
Start: 2024-01-26 | End: 2024-01-26 | Stop reason: HOSPADM

## 2024-01-26 RX ORDER — MIDODRINE HYDROCHLORIDE 5 MG/1
5 TABLET ORAL
Status: DISCONTINUED | OUTPATIENT
Start: 2024-01-26 | End: 2024-01-26 | Stop reason: HOSPADM

## 2024-01-26 RX ORDER — ETOMIDATE 2 MG/ML
INJECTION INTRAVENOUS
Status: DISCONTINUED | OUTPATIENT
Start: 2024-01-26 | End: 2024-01-26

## 2024-01-26 RX ORDER — SODIUM CHLORIDE 0.9 % (FLUSH) 0.9 %
3 SYRINGE (ML) INJECTION
Status: DISCONTINUED | OUTPATIENT
Start: 2024-01-26 | End: 2024-01-26 | Stop reason: HOSPADM

## 2024-01-26 RX ORDER — IBUPROFEN 200 MG
16 TABLET ORAL
Status: DISCONTINUED | OUTPATIENT
Start: 2024-01-26 | End: 2024-01-26 | Stop reason: HOSPADM

## 2024-01-26 RX ORDER — KETAMINE HCL IN 0.9 % NACL 50 MG/5 ML
SYRINGE (ML) INTRAVENOUS
Status: DISCONTINUED | OUTPATIENT
Start: 2024-01-26 | End: 2024-01-26

## 2024-01-26 RX ADMIN — ETOMIDATE 6 MG: 2 INJECTION, SOLUTION INTRAVENOUS at 11:01

## 2024-01-26 RX ADMIN — LIDOCAINE HYDROCHLORIDE 15 ML: 20 SOLUTION OROPHARYNGEAL at 11:01

## 2024-01-26 RX ADMIN — TACROLIMUS 0.5 MG: 0.5 CAPSULE ORAL at 08:01

## 2024-01-26 RX ADMIN — ETOMIDATE 10 MG: 2 INJECTION, SOLUTION INTRAVENOUS at 11:01

## 2024-01-26 RX ADMIN — PANTOPRAZOLE SODIUM 40 MG: 40 TABLET, DELAYED RELEASE ORAL at 08:01

## 2024-01-26 RX ADMIN — CLOPIDOGREL BISULFATE 75 MG: 75 TABLET ORAL at 08:01

## 2024-01-26 RX ADMIN — FUROSEMIDE 40 MG: 10 INJECTION, SOLUTION INTRAVENOUS at 08:01

## 2024-01-26 RX ADMIN — Medication 20 MG: at 11:01

## 2024-01-26 RX ADMIN — APIXABAN 5 MG: 5 TABLET, FILM COATED ORAL at 08:01

## 2024-01-26 RX ADMIN — SODIUM CHLORIDE, SODIUM GLUCONATE, SODIUM ACETATE, POTASSIUM CHLORIDE, MAGNESIUM CHLORIDE, SODIUM PHOSPHATE, DIBASIC, AND POTASSIUM PHOSPHATE: .53; .5; .37; .037; .03; .012; .00082 INJECTION, SOLUTION INTRAVENOUS at 11:01

## 2024-01-26 RX ADMIN — ATORVASTATIN CALCIUM 80 MG: 40 TABLET, FILM COATED ORAL at 08:01

## 2024-01-26 RX ADMIN — METOPROLOL SUCCINATE 25 MG: 25 TABLET, EXTENDED RELEASE ORAL at 08:01

## 2024-01-26 RX ADMIN — Medication 10 MG: at 11:01

## 2024-01-26 NOTE — ASSESSMENT & PLAN NOTE
Home meds for hypertension were reviewed and noted below.   Hypertension Medications               metoprolol succinate (TOPROL-XL) 25 MG 24 hr tablet Take 1 tablet (25 mg total) by mouth once daily.          -Continuing home antihypertensives

## 2024-01-26 NOTE — ASSESSMENT & PLAN NOTE
"Patient admitted with volume overload. CXR: "Bibasilar airspace opacities with perihilar interstitial prominence and pulmonary vascular congestion.  Findings are compatible with pulmonary edema/CHF".    Baseline BNP:   Lab Results   Component Value Date     (H) 01/25/2024     (H) 12/29/2023     (H) 12/22/2023    BNP 91 04/11/2022    BNP 45 02/01/2022      Results for orders placed during the hospital encounter of 12/22/23    Echo    Interpretation Summary    Left Ventricle: The left ventricle is normal in size. Mildly increased wall thickness. There is concentric remodeling. There is severely reduced systolic function with a visually estimated ejection fraction of 20 - 25%. All walls except for the basal septum are hypokinetic. When directly compared to images from 7/5/2022 there has been a significant change in left ventricular systolic function.    LV Diastolic function: Unable to assess diastolic function due to E-A fusion. Average E/e' ratio is 11.    Right Ventricle: Normal right ventricular cavity size. Wall thickness is normal. Right ventricle wall motion  is normal. Systolic function is normal.    Left Atrium: Left atrium is severely dilated.    Aortic Valve: There is moderate to severe low gradient aortic stenosis. Aortic valve area by VTI is 0.97 cm². LVOT diameter is 2.4 cm. Aortic valve peak velocity is 3.24 m/s. Mean gradient is 28 mmHg. The dimensionless index is 0.21. There is no significant regurgitation.    Mitral Valve: There is mild to moderate regurgitation.    Tricuspid Valve: There is mild to moderate regurgitation.    Pulmonary Artery: The estimated pulmonary artery systolic pressure is 38 mm Hg.    IVC/SVC: Low central venous pressure.    Pericardium: There is a very small circumferential effusion; slightly more fluid is located laterally.    - furosemide (LASIX) 40 mg two times daily  - Continue home metoprolol  - Patient likely unable to tolerate rest of GDMT as patient " on midodrine  - Continue home midodrine  - Fluid restriction at 1.5L/day, strict I/Os and daily standing weights, maintain on telemetry and daily EKGs, maintain Mag >2 & K+ >4  - SCDs, TEDs, Nursing communication to elevated LE, ambulate as tolerated

## 2024-01-26 NOTE — NURSING TRANSFER
Nursing Transfer Note      1/26/2024   12:53 PM    Nurse giving handoff:Devin Cabrera Rn  Nurse receiving handoff:NICHELLE Mills    Reason patient is being transferred: back to ER, waiting on bed assignment    Transfer From: cath [facility]    Transfer via stretcher    Transfer with cardiac monitoring    Transported by transporter    Transfer Vital Signs:  Blood Pressure:120/68  Heart Rate:73  O2: 94% on room air  Temperature:97.8  Respirations:17    Telemetry: Box Number [id number] and Rate 73  Order for Tele Monitor? Yes    Additional Lines: n/a    4eyes on Skin: yes    Medicines sent: none    Any special needs or follow-up needed: assessment and vs    Patient belongings transferred with patient: Yes    Chart send with patient: Yes    Notified: phone call and message to Aniya    Patient reassessed at: 1245 01/26/2024  at 1245  1  Upon arrival to floor: patient oriented to room and call bell in reach

## 2024-01-26 NOTE — ANESTHESIA POSTPROCEDURE EVALUATION
Anesthesia Post Evaluation    Patient: Tej Purdy    Procedure(s) Performed: Procedure(s) (LRB):  Cardioversion or Defibrillation (N/A)  Transesophageal echo (ASHISH) intra-procedure log documentation (N/A)    Final Anesthesia Type: general      Patient location during evaluation: PACU  Patient participation: Yes- Able to Participate  Level of consciousness: awake and alert  Post-procedure vital signs: reviewed and stable  Pain management: adequate  Airway patency: patent    PONV status at discharge: No PONV  Anesthetic complications: no      Cardiovascular status: blood pressure returned to baseline  Respiratory status: unassisted  Hydration status: euvolemic  Follow-up not needed.              Vitals Value Taken Time   /62 01/26/24 1407   Temp 36.9 °C (98.4 °F) 01/26/24 1407   Pulse 79 01/26/24 1408   Resp 13 01/26/24 1408   SpO2 93 % 01/26/24 1408   Vitals shown include unvalidated device data.      No case tracking events are documented in the log.      Pain/Cordelia Score: Cordelia Score: 10 (1/26/2024 12:45 PM)

## 2024-01-26 NOTE — ANESTHESIA PROCEDURE NOTES
Arterial    Diagnosis: systolic CHF    Patient location during procedure: done in OR    Staffing  Authorizing Provider: Gus Hernadez MD  Performing Provider: Gus Hernadez MD    Staffing  Performed by: Gus Hernadez MD  Authorized by: Gus Hernadez MD    Anesthesiologist was present at the time of the procedure.    Preanesthetic Checklist  Completed: patient identified, IV checked, site marked, risks and benefits discussed, surgical consent, monitors and equipment checked, pre-op evaluation, timeout performed and anesthesia consent givenArterial  Skin Prep: chlorhexidine gluconate  Local Infiltration: lidocaine  Orientation: right  Location: radial    Catheter Size: 20 G  Catheter placement by Ultrasound guidance. Heme positive aspiration all ports.   Vessel Caliber: small, patent  Needle advanced into vessel with real time Ultrasound guidance.Insertion Attempts: 2  Assessment  Dressing: secured with tape and tegaderm  Patient: Tolerated well

## 2024-01-26 NOTE — ASSESSMENT & PLAN NOTE
Diffuse abdominal pain and tenderness. Patent states is chronic but perhaps worsened lately. WBC>12. Lactate wnl. High risk for SBP. Some noted ascites on exam but abdomen not distended.     -F/u repeat lactate  -Low threshold to start antibiotics for SBP

## 2024-01-26 NOTE — ED NOTES
This nurse spoke with Latrice in EP pending patient's scheduled cardioversion. States patient will be ready for transport to EP closer to approx 10am. Patient remains in NAD. VSS; RR even, unlabored, equal bilaterally on inspiration and expiration. Care ongoing.

## 2024-01-26 NOTE — ASSESSMENT & PLAN NOTE
"Lab Results   Component Value Date    HGBA1C 6.5 (H) 12/23/2023     No results for input(s): "POCTGLUCOSE" in the last 72 hours.  Antihyperglycemics (From admission, onward)      Start     Stop Route Frequency Ordered    01/26/24 0233  insulin aspart U-100 pen 0-5 Units         -- SubQ Before meals & nightly PRN 01/26/24 0133          - continue SSI  - diabetic diet  - POCT glucose checks TIDWM and qhs  - goal glucose 140-180      "

## 2024-01-26 NOTE — HPI
Patient is a 67 yo man with a PMH of hypertension, diabetes mellitus type 2 on insulin, hyperlipidemia, nonalcoholic steatohepatitis, history of cirrhosis, history of cholangiocarcinoma, history of liver transplant on 1/6/2022 (immunosuppressed on tacrolimus), persistent atrial fibrillation status post cardioversion on 5/19/2021, 5/31/2021, 7/7/2021, 7/27/2021 (anticoagulated on apixaban), aortic stenosis, heart failure with reduced ejection fraction, and peripheral artery disease who presents with worsening shortness of breath and chest tightness for the past 3-4 days.  He states symptoms are similar to those he had when he was admitted in early January for CHF exacerbation.  He states that symptoms are worse with any exertion and are accompanied with severe fatigue for that fatigue has been chronic issue since his liver transplant 3 years ago.  Additionally he states that he has had diarrhea for the past 1 day and complains of minor abdominal pain that is also chronic but perhaps slightly worse than usual.  He states he is unable to lay flat and has had trouble sleeping for the past 2 nights.  He denies fevers, chills, nausea, vomiting, chest pain, lightheadedness, or dizziness.    Of note, patient was scheduled to have cardioversion with ASHISH the morning of 1/26 (day after admission).    While in the ED: Pt hypoxic in the 80s upon arrival in the ED. placed on 3 L nasal cannula with improvment. EKG without ST elevation. BNP >900. Trop negative. CXR with evidence of pulmonary edema. Treated with lasix for suspected fluid overload.

## 2024-01-26 NOTE — NURSING
Went over discharge instructions with patient.   Stressed importance of making and keeping all follow ups.   Patient verbalized understanding and has no questions in regards to discharge.  IV removed, catheter intact.  Telemetry box removed.  Patient dressed and awaiting ride.

## 2024-01-26 NOTE — HOSPITAL COURSE
Pt admitted with CHF exacerbation. Experiencing SOB with O2 sats in the 80s. Initially on 3L NC. Weaned to 1 L. Started on 40mg lasix BID to adequate response. EKG with sinus tachycardia with Nonspecific A-V block. Pt was scheduled to have ASHISH with cardioversion 1/26/24 prior to admission. Procedure able to be performed while inpt today. Pt sucessfully cardioverted. He is no longer hypoxic/dyspneic. He is medically stable to discharge. Explained to pt that it is a good idea to decrease midodrine dosage as this medication may worsen CHF. Follow-up with cardiac rehab will be necessary. A referral to psych was also ordered at the pt's request.

## 2024-01-26 NOTE — SUBJECTIVE & OBJECTIVE
Past Medical History:   Diagnosis Date    Acute appendicitis 06/02/2023    Atrial fibrillation     Calculus of ureter 12/22/2023    Cholangiocarcinoma 03/16/2022    Cirrhosis 11/23/2020    Diabetes mellitus, type 2     Diabetic neuropathy 11/24/2020    Disorder of kidney and ureter     HTN (hypertension) 11/23/2020    Hyperlipidemia 11/23/2020    Kidney stones 11/23/2020    S/p lithotripsy 2020    Leukocytosis 01/15/2021    Liver mass 11/23/2020    CASTILLO (nonalcoholic steatohepatitis) 11/23/2020    Nausea and vomiting 08/21/2023    Other ascites 03/16/2022    PAD (peripheral artery disease)     Portal hypertension 11/23/2020    Skin cancer 11/23/2020       Past Surgical History:   Procedure Laterality Date    ANGIOGRAM, CORONARY, WITH LEFT HEART CATHETERIZATION N/A 12/26/2023    Procedure: Angiogram, Coronary, with Left Heart Cath;  Surgeon: Carlos King MD;  Location: [facility] CATH LAB;  Service: Cardiology;  Laterality: N/A;    COLONOSCOPY  10/2020    ESOPHAGEAL MANOMETRY WITH MEASUREMENT OF IMPEDANCE N/A 7/17/2023    Procedure: MANOMETRY, ESOPHAGUS, WITH IMPEDANCE MEASUREMENT;  Surgeon: Klarissa Zamora MD;  [facility]  Service: Gastroenterology;  Laterality: N/A;  pt diabetic  liver txp  prep insructions sent to pt via email and portal -    ESOPHAGOGASTRODUODENOSCOPY  10/2020    ESOPHAGOGASTRODUODENOSCOPY N/A 7/1/2021    Procedure: EGD (ESOPHAGOGASTRODUODENOSCOPY);  Surgeon: Jovan David MD;  [facility]  Service: Endoscopy;  Laterality: N/A;  HCC. Listed for liver transplant. EGD for variceal surveillance.  cardiac clearance and blood thinenr approval received, see telephone encounter 6/23/21-BB  fully vaccinated-BB  labs same day of procedure-BB    EXCISION OF HYDROCELE Right     hemorroid surgery      hemorroidectomy      KIDNEY STONE SURGERY      LAPAROSCOPIC APPENDECTOMY N/A 6/2/2023    Procedure: APPENDECTOMY, LAPAROSCOPIC;  Surgeon: Shna Ross MD;   Location: [facility] OR [facility] FLR;  Service: General;  Laterality: N/A;    LEFT HEART CATHETERIZATION N/A 1/27/2021    Procedure: Left heart cath;  Surgeon: Kris Shelton MD;  Location: [facility] CATH LAB;  Service: Cardiology;  Laterality: N/A;    liver mass removal      LIVER TRANSPLANT N/A 1/6/2022    Procedure: TRANSPLANT, LIVER;  Surgeon: Lizet Garcia MD;  Location: [facility] OR [facility];  Service: Transplant;  Laterality: N/A;    RIGHT HEART CATHETERIZATION Right 5/7/2021    Procedure: INSERTION, CATHETER, RIGHT HEART;  Surgeon: Jaden Schuster MD;  Location: [facility] CATH LAB;  Service: Cardiology;  Laterality: Right;    STENT, DRUG ELUTING, SINGLE VESSEL, CORONARY  12/26/2023    Procedure: Stent, Drug Eluting, Single Vessel, Coronary;  Surgeon: Carlos King MD;  Location: [facility] CATH LAB;  Service: Cardiology;;    TREATMENT OF CARDIAC ARRHYTHMIA N/A 5/19/2021    Procedure: CARDIOVERSION;  Surgeon: Didier Tilley MD;  Location: [facility] EP LAB;  Service: Cardiology;  Laterality: N/A;  a fib, dccv/johny, mac, dm. sscu    TREATMENT OF CARDIAC ARRHYTHMIA N/A 5/31/2021    Procedure: CARDIOVERSION;  Surgeon: Daniel Arambula MD;  Location: [facility] EP LAB;  Service: Cardiology;  Laterality: N/A;  afib, DCCV, anest., DM, 3 prep    TREATMENT OF CARDIAC ARRHYTHMIA N/A 7/7/2021    Procedure: Cardioversion or Defibrillation;  Surgeon: Didier Tilley MD;  Location: [facility] EP LAB;  Service: Cardiology;  Laterality: N/A;  AF, JOHNY (cancel if complaint), DCCV, MAC, DM, 3 Prep    TREATMENT OF CARDIAC ARRHYTHMIA N/A 7/27/2021    Procedure: Cardioversion or Defibrillation;  Surgeon: Didier Tilley MD;  Location: [facility] EP LAB;  Service: Cardiology;  Laterality: N/A;  AF, JOHNY (cx if complaint), DCCV, MAC, DM, 3 Prep       Review of patient's allergies indicates:   Allergen Reactions    Bee pollens Swelling     BEE STINGS swells body       Current Facility-Administered Medications on File Prior to Encounter   Medication    sodium chloride 0.9%  bolus 1,000 mL     Current Outpatient Medications on File Prior to Encounter   Medication Sig    atorvastatin (LIPITOR) 80 MG tablet Take 1 tablet (80 mg total) by mouth once daily.    buPROPion (WELLBUTRIN XL) 150 MG TB24 tablet Take 1 tablet (150 mg total) by mouth every morning.    clopidogreL (PLAVIX) 75 mg tablet Take 1 tablet (75 mg total) by mouth once daily.    gabapentin (NEURONTIN) 300 MG capsule Take 1 capsule (300 mg total) by mouth 3 (three) times daily as needed (neuropathy).    apixaban (ELIQUIS) 5 mg Tab Take 1 tablet (5 mg total) by mouth 2 (two) times daily.    aspirin (ECOTRIN) 81 MG EC tablet Take 81 mg by mouth once daily.    blood sugar diagnostic Strp To check BG 2 times daily, to use with insurance preferred meter (patient has a [employer] self-monitoring glucose meter)    blood-glucose meter kit To check BG 2 times daily, to use with insurance preferred meter    calcium carbonate-vitamin D3 600 mg-20 mcg (800 unit) Tab Take 1 tablet by mouth once daily.    insulin (LANTUS SOLOSTAR U-100 INSULIN) glargine 100 units/mL SubQ pen INJECT 22 UNITS INTO THE SKIN EVERY EVENING. ADJUST DOSE UNTIL AM GLUCOSE GOAL . MAX DOSE 30 UNITS/DY    insulin lispro (HUMALOG KWIKPEN INSULIN) 100 unit/mL pen Inject 6 Units into the skin 3 (three) times daily with meals. Plus sliding scale, MDD: 48 units    lancets Misc To check BG 2 times daily, to use with insurance preferred meter    magnesium oxide (MAG-OX) 400 mg (241.3 mg magnesium) tablet Take 2 tablets (800 mg total) by mouth 3 (three) times daily.    metoprolol succinate (TOPROL-XL) 25 MG 24 hr tablet Take 1 tablet (25 mg total) by mouth once daily.    midodrine (PROAMATINE) 10 MG tablet Take 1 tablet (10 mg total) by mouth 3 (three) times daily with meals.    multivitamin capsule Take 1 capsule by mouth once daily.    omega-3 fatty acids/fish oil (FISH OIL-OMEGA-3 FATTY ACIDS) 300-1,000 mg capsule Take 1 capsule by mouth once daily.     ondansetron  "(ZOFRAN-ODT) 4 MG TbDL Dissolve 1 tablet (4 mg total) by mouth every 8 (eight) hours as needed (nausea).    pantoprazole (PROTONIX) 40 MG tablet Take 1 tablet (40 mg total) by mouth once daily.    pen needle, diabetic (BD ULTRA-FINE GÉNESIS PEN NEEDLE) 32 gauge x 5/32" Ndle Use to inject insulin 4 times daily    QUEtiapine (SEROQUEL) 100 MG Tab Take 1 tablet (100 mg total) by mouth every evening.    sertraline (ZOLOFT) 25 MG tablet Take 1 tablet (25 mg total) by mouth once daily.    tacrolimus (PROGRAF) 0.5 MG Cap Take 1 capsule (0.5 mg total) by mouth every 12 (twelve) hours.     Family History       Problem Relation (Age of Onset)    Coronary artery disease Father          Tobacco Use    Smoking status: Former     Current packs/day: 0.00     Types: Cigarettes     Quit date: 1980     Years since quittin.0    Smokeless tobacco: Former   Substance and Sexual Activity    Alcohol use: Not Currently    Drug use: Never    Sexual activity: Not on file     Review of Systems   Constitutional:  Positive for fatigue. Negative for chills, diaphoresis and fever.   Eyes:  Negative for visual disturbance.   Respiratory:  Positive for chest tightness and shortness of breath. Negative for cough.    Cardiovascular:  Positive for palpitations. Negative for chest pain and leg swelling.   Gastrointestinal:  Positive for abdominal pain and diarrhea. Negative for constipation, nausea and vomiting.   Genitourinary:  Negative for dysuria, frequency and urgency.   Neurological:  Negative for dizziness, light-headedness and headaches.     Objective:     Vital Signs (Most Recent):  Temp: 97.6 °F (36.4 °C) (24 1715)  Pulse: 98 (24)  Resp: (!) 22 (24)  BP: 132/73 (24)  SpO2: 100 % (24) Vital Signs (24h Range):  Temp:  [97.6 °F (36.4 °C)] 97.6 °F (36.4 °C)  Pulse:  [] 98  Resp:  [18-22] 22  SpO2:  [86 %-100 %] 100 %  BP: (132-162)/() 132/73     Weight: 93.9 kg (207 lb)  Body " mass index is 28.07 kg/m².     Physical Exam  Constitutional:       General: He is not in acute distress.     Appearance: Normal appearance. He is not ill-appearing.   HENT:      Head: Normocephalic and atraumatic.      Nose: Nose normal.      Mouth/Throat:      Mouth: Mucous membranes are moist.   Eyes:      Extraocular Movements: Extraocular movements intact.      Conjunctiva/sclera: Conjunctivae normal.      Pupils: Pupils are equal, round, and reactive to light.   Neck:      Comments: No noted JVD  Cardiovascular:      Rate and Rhythm: Normal rate. Rhythm irregular.      Pulses: Normal pulses.      Heart sounds: Murmur (3/6 systolic murmur) heard.   Pulmonary:      Effort: Pulmonary effort is normal. No respiratory distress.      Breath sounds: Rales present. No wheezing or rhonchi.      Comments: Breathing comfortably on 4L NC  Abdominal:      General: Abdomen is flat. Bowel sounds are normal. There is no distension.      Palpations: Abdomen is soft.      Tenderness: There is abdominal tenderness (Minor diffuse abdominal pain.).   Musculoskeletal:         General: Normal range of motion.      Right lower le+ Edema present.      Left lower le+ Edema present.   Skin:     General: Skin is warm and dry.      Capillary Refill: Capillary refill takes less than 2 seconds.   Neurological:      Mental Status: He is alert and oriented to person, place, and time.   Psychiatric:         Mood and Affect: Mood normal.         Behavior: Behavior normal.         Thought Content: Thought content normal.         Judgment: Judgment normal.              CRANIAL NERVES     CN III, IV, VI   Pupils are equal, round, and reactive to light.       Significant Labs: All pertinent labs within the past 24 hours have been reviewed.  CBC:   Recent Labs   Lab 24   WBC 12.42   HGB 9.7*   HCT 36.0*        CMP:   Recent Labs   Lab 24      K 5.0      CO2 21*   *   BUN 27*   CREATININE 1.1    CALCIUM 8.9   PROT 7.4   ALBUMIN 3.6   BILITOT 1.2*   ALKPHOS 146*   AST 35   ALT 27   ANIONGAP 12       Significant Imaging: I have reviewed all pertinent imaging results/findings within the past 24 hours.

## 2024-01-26 NOTE — ANESTHESIA PREPROCEDURE EVALUATION
[facility]  Anesthesia Pre-Operative Evaluation         Patient Name: Tej Purdy  YOB: 1955  MRN: [id number]    SUBJECTIVE:     Pre-operative Evaluation for Procedure(s) (LRB):  Cardioversion or Defibrillation (N/A)  Transesophageal echo (ASHISH) intra-procedure log documentation (N/A)     01/26/2024    Tej Purdy is a 68 y.o. male with a PMHx significant for HTN, CAD (s/p PCI to 1Mrg 12/2023), severely reduced LV systolic function (LVEF 20-25%), moderate-severe low-gradient aortic stenosis, persistent AFib (s/p multiple DCCV), HLD, T2DM (HgbA1c = 6.5%), and history of cholangiocarcinoma (s/p orthotopic liver transplant Jan 2022) who presented to [facility] with progressive shortness of breath and orthopnea. Patient with SpO2 86% on room air on arrival, with imaging consistent with pulmonary edema/vascular congestion due to volume overload in setting of decompensated heart failure.     Patient initially scheduled as outpatient for ASHISH/DCCV on Fri 1/26 following adenosine test given findings of possible atrial tachycardia or atrial flutter on most recent EKG.     He now presents for the above procedure(s) with Cardiology - Dr. Tilley.    Previous Airway (06/02/2023):     Mask Ventilation:  Not attempted    Attempts:  1   Attempted By:  CRNA    Method of Intubation:  Video laryngoscopy    Blade:  Mireles 3    Laryngeal View Grade: Grade I - full view of cords      Difficult Airway Encountered?: No     Complications:  None    Airway Device:  Oral endotracheal tube    Airway Device Size:  7.5    Tube secured:  23   Secured at:  The lips    Complicating Factors:  None    LDA:        Peripheral IV - Single Lumen 01/25/24 2000 20 G Right Hand (Active)   Number of days: 0       Drips: None documented.      Patient Active Problem List   Diagnosis    Hyperlipidemia    Primary hypertension    Skin cancer    Kidney stone    Diabetic neuropathy    PAD (peripheral artery disease)    Abdominal  pain    Type 2 diabetes mellitus, with long-term current use of insulin    Coronary artery disease involving native coronary artery of native heart without angina pectoris    Persistent atrial fibrillation    History of cardioversion    Current use of anticoagulant therapy    S/P liver transplant    At risk for opportunistic infections    Prophylactic immunotherapy    Anemia of chronic disease    Long-term use of immunosuppressant medication    Weakness    Type 2 diabetes mellitus with hyperglycemia    Anemia due to unknown mechanism    Fatigue    Other ascites    Cholangiocarcinoma    Nonrheumatic aortic valve stenosis    HFrEF (heart failure with reduced ejection fraction)    Unstable angina    Presence of drug-eluting stent in left circumflex coronary artery    Orthostatic dizziness    Nephrolithiasis       Review of patient's allergies indicates:   Allergen Reactions    Bee pollens Swelling     BEE STINGS swells body       Current Inpatient Medications:   apixaban  5 mg Oral BID    atorvastatin  80 mg Oral Daily    clopidogreL  75 mg Oral Daily    furosemide (LASIX) injection  40 mg Intravenous BID    metoprolol succinate  25 mg Oral Daily    midodrine  10 mg Oral TID WM    pantoprazole  40 mg Oral Daily    tacrolimus  0.5 mg Oral BID       Current Outpatient Medications   Medication Instructions    aspirin (ECOTRIN) 81 mg, Oral, Daily    atorvastatin (LIPITOR) 80 mg, Oral, Daily    blood sugar diagnostic Strp To check BG 2 times daily, to use with insurance preferred meter (patient has a [employer] self-monitoring glucose meter)    blood-glucose meter kit To check BG 2 times daily, to use with insurance preferred meter    buPROPion (WELLBUTRIN XL) 150 mg, Oral, Every morning    calcium carbonate-vitamin D3 600 mg-20 mcg (800 unit) Tab 1 tablet, Oral, Daily    clopidogreL (PLAVIX) 75 mg, Oral, Daily    ELIQUIS 5 mg, Oral, 2 times daily    gabapentin (NEURONTIN) 300 mg, Oral, 3 times daily PRN    HumaLOG Christian  "Insulin 6 Units, Subcutaneous, 3 times daily with meals, Plus sliding scale, MDD: 48 units    insulin (LANTUS SOLOSTAR U-100 INSULIN) glargine 100 units/mL SubQ pen INJECT 22 UNITS INTO THE SKIN EVERY EVENING. ADJUST DOSE UNTIL AM GLUCOSE GOAL . MAX DOSE 30 UNITS/DY    lancets Misc To check BG 2 times daily, to use with insurance preferred meter    magnesium oxide (MAG-OX) 800 mg, Oral, 3 times daily    metoprolol succinate (TOPROL-XL) 25 mg, Oral, Daily    midodrine (PROAMATINE) 10 mg, Oral, 3 times daily with meals    multivitamin capsule 1 capsule, Oral, Daily    omega-3 fatty acids/fish oil (FISH OIL-OMEGA-3 FATTY ACIDS) 300-1,000 mg capsule 1 capsule, Oral, Daily    ondansetron (ZOFRAN-ODT) 4 MG TbDL Dissolve 1 tablet (4 mg total) by mouth every 8 (eight) hours as needed (nausea).    pantoprazole (PROTONIX) 40 mg, Oral, Daily    pen needle, diabetic (BD ULTRA-FINE GÉNESIS PEN NEEDLE) 32 gauge x 5/32" Ndle Use to inject insulin 4 times daily    QUEtiapine (SEROQUEL) 100 mg, Oral, Nightly    sertraline (ZOLOFT) 25 mg, Oral, Daily    tacrolimus (PROGRAF) 0.5 mg, Oral, Every 12 hours       Past Surgical History:   Procedure Laterality Date    ANGIOGRAM, CORONARY, WITH LEFT HEART CATHETERIZATION N/A 12/26/2023    Procedure: Angiogram, Coronary, with Left Heart Cath;  Surgeon: Carlos King MD;  Location: [facility] CATH LAB;  Service: Cardiology;  Laterality: N/A;    COLONOSCOPY  10/2020    ESOPHAGEAL MANOMETRY WITH MEASUREMENT OF IMPEDANCE N/A 7/17/2023    Procedure: MANOMETRY, ESOPHAGUS, WITH IMPEDANCE MEASUREMENT;  Surgeon: Klarissa Zamora MD;  Location: [facility] ENDO (4TH FLR);  Service: Gastroenterology;  Laterality: N/A;  pt diabetic  liver txp  prep insructions sent to pt via email and portal -    ESOPHAGOGASTRODUODENOSCOPY  10/2020    ESOPHAGOGASTRODUODENOSCOPY N/A 7/1/2021    Procedure: EGD (ESOPHAGOGASTRODUODENOSCOPY);  Surgeon: Jovan David MD;  Location: [facility] ENDO (4TH FLR);  Service: Endoscopy;  " Laterality: N/A;  HCC. Listed for liver transplant. EGD for variceal surveillance.  cardiac clearance and blood thinenr approval received, see telephone encounter 6/23/21-BB  fully vaccinated-BB  labs same day of procedure-BB    EXCISION OF HYDROCELE Right     hemorroid surgery      hemorroidectomy      KIDNEY STONE SURGERY      LAPAROSCOPIC APPENDECTOMY N/A 6/2/2023    Procedure: APPENDECTOMY, LAPAROSCOPIC;  Surgeon: Shan Ross MD;  [facility];  Service: General;  Laterality: N/A;    LEFT HEART CATHETERIZATION N/A 1/27/2021    Procedure: Left heart cath;  Surgeon: Kris Shelton MD;  Location: [facility] CATH LAB;  Service: Cardiology;  Laterality: N/A;    liver mass removal      LIVER TRANSPLANT N/A 1/6/2022    Procedure: TRANSPLANT, LIVER;  Surgeon: Lizet Garcia MD;  [facility];  Service: Transplant;  Laterality: N/A;    RIGHT HEART CATHETERIZATION Right 5/7/2021    Procedure: INSERTION, CATHETER, RIGHT HEART;  Surgeon: Jaden Schuster MD;  Location: [facility] CATH LAB;  Service: Cardiology;  Laterality: Right;    STENT, DRUG ELUTING, SINGLE VESSEL, CORONARY  12/26/2023    Procedure: Stent, Drug Eluting, Single Vessel, Coronary;  Surgeon: Carlos King MD;  Location: [facility] CATH LAB;  Service: Cardiology;;    TREATMENT OF CARDIAC ARRHYTHMIA N/A 5/19/2021    Procedure: CARDIOVERSION;  Surgeon: Didier Tilley MD;  Location: [facility] EP LAB;  Service: Cardiology;  Laterality: N/A;  a fib, dccv/johny, mac, dm. sscu    TREATMENT OF CARDIAC ARRHYTHMIA N/A 5/31/2021    Procedure: CARDIOVERSION;  Surgeon: Daniel Arambula MD;  Location: [facility] EP LAB;  Service: Cardiology;  Laterality: N/A;  afib, DCCV, anest., DM, 3 prep    TREATMENT OF CARDIAC ARRHYTHMIA N/A 7/7/2021    Procedure: Cardioversion or Defibrillation;  Surgeon: Didier Tilley MD;  Location: [facility] EP LAB;  Service: Cardiology;  Laterality: N/A;  AF, JOHNY (cancel if complaint), DCCV, MAC, DM, 3 Prep    TREATMENT OF CARDIAC  ARRHYTHMIA N/A 7/27/2021    Procedure: Cardioversion or Defibrillation;  Surgeon: Didier Tilley MD;  Location: [facility] LAB;  Service: Cardiology;  Laterality: N/A;  AF, ASHISH (cx if complaint), DCCV, MAC, DM, 3 Prep       Social History     Substance and Sexual Activity   Drug Use Never     Tobacco Use: Medium Risk (1/25/2024)    Patient History     Smoking Tobacco Use: Former     Smokeless Tobacco Use: Former     Passive Exposure: Not on file     Alcohol Use: Not At Risk (1/22/2024)    AUDIT-C     Frequency of Alcohol Consumption: Never     Average Number of Drinks: Patient does not drink     Frequency of Binge Drinking: Never       OBJECTIVE:     Vital Signs Range (Last 24H):  Temp:  [36.4 °C (97.6 °F)]   Pulse:  []   Resp:  [18-22]   BP: (132-162)/()   SpO2:  [86 %-100 %]       Significant Labs    Heme Profile  Lab Results   Component Value Date    WBC 12.42 01/25/2024    HGB 9.7 (L) 01/25/2024    HCT 36.0 (L) 01/25/2024     01/25/2024       Coagulation Studies  Lab Results   Component Value Date    LABPROT 7.8 (H) 08/23/2023    INR 1.3 (H) 06/01/2023    APTT 25.6 01/13/2022       BMP  Lab Results   Component Value Date     01/25/2024    K 5.0 01/25/2024     01/25/2024    CO2 21 (L) 01/25/2024    BUN 27 (H) 01/25/2024    CREATININE 1.1 01/25/2024    MG 1.7 12/25/2023    PHOS 3.2 12/25/2023    CAION 1.01 (L) 01/07/2022       Liver Function Tests  Lab Results   Component Value Date    AST 35 01/25/2024    ALT 27 01/25/2024    ALKPHOS 146 (H) 01/25/2024    BILITOT 1.2 (H) 01/25/2024    PROT 7.4 01/25/2024    ALBUMIN 3.6 01/25/2024       Lipid Profile  Lab Results   Component Value Date    CHOL 130 09/19/2022    HDL 36 (L) 09/19/2022    TRIG 144 09/19/2022       Endocrine Profile  Lab Results   Component Value Date    HGBA1C 6.5 (H) 12/23/2023    TSH 2.223 12/22/2023       Diagnostic Studies    XR Chest (01/25/2024)  Bibasilar airspace opacities with perihilar interstitial prominence  and pulmonary vascular congestion.  Findings are compatible with pulmonary edema/CHF.     Cardiac Studies    EKG:   Results for orders placed or performed during the hospital encounter of 12/22/23   EKG 12-lead    Collection Time: 12/27/23  1:14 PM    Narrative    Test Reason : R52,    Vent. Rate : 102 BPM     Atrial Rate : 200 BPM     P-R Int : 000 ms          QRS Dur : 138 ms      QT Int : 546 ms       P-R-T Axes : 000 -65 -48 degrees     QTc Int : 711 ms    Atrial flutter with variable A-V block with Premature ventricular complexes  Left axis deviation  Nonspecific intraventricular block  Minimal voltage criteria for LVH, may be normal variant ( Matt product )  Nonspecific T wave abnormality  Abnormal ECG  When compared with ECG of 26-DEC-2023 12:29,  Premature ventricular complexes Now present  Confirmed by Davon Javier MD ([id number]) on 12/28/2023 1:24:57 AM    Referred By: [email or web address]   SELF           Confirmed By:Davon Javier MD       TTE (12/23/2023):  Interpretation Summary    Left Ventricle: The left ventricle is normal in size. Mildly increased wall thickness. There is concentric remodeling. There is severely reduced systolic function with a visually estimated ejection fraction of 20 - 25%. All walls except for the basal septum are hypokinetic. When directly compared to images from 7/5/2022 there has been a significant change in left ventricular systolic function.    LV Diastolic function: Unable to assess diastolic function due to E-A fusion. Average E/e' ratio is 11.    Right Ventricle: Normal right ventricular cavity size. Wall thickness is normal. Right ventricle wall motion  is normal. Systolic function is normal.    Left Atrium: Left atrium is severely dilated.    Aortic Valve: There is moderate to severe low gradient aortic stenosis. Aortic valve area by VTI is 0.97 cm². LVOT diameter is 2.4 cm. Aortic valve peak velocity is 3.24 m/s. Mean gradient is 28 mmHg. The dimensionless index is 0.21. There is no  significant regurgitation.    Mitral Valve: There is mild to moderate regurgitation.    Tricuspid Valve: There is mild to moderate regurgitation.    Pulmonary Artery: The estimated pulmonary artery systolic pressure is 38 mm Hg.    IVC/SVC: Low central venous pressure.    Pericardium: There is a very small circumferential effusion; slightly more fluid is located laterally.      Cardiac Catheterization (12/26/2023):  Conclusion    The 1st Mrg lesion was 90% stenosed with 0% stenosis post-intervention.    1st Mrg lesion: A .    1st Mrg lesion: A STENT SYNERGY XD 3.0X24MM .    The estimated blood loss was none.    There was single vessel coronary artery disease.    The PCI was successful.      ASSESSMENT/PLAN:     Tej Purdy is a 68 y.o. male with symptomatic atrial arrhythmia presenting for adenosine test with possible ASHISH/DCCV.      Pre-op Assessment    I have reviewed the Patient Summary Reports.     I have reviewed the Nursing Notes. I have reviewed the NPO Status.   I have reviewed the Medications.     Review of Systems  Anesthesia Hx:  No problems with previous Anesthesia   History of prior surgery of interest to airway management or planning: liver transplant. Previous anesthesia: General          Denies Personal Hx of Anesthesia complications.                    Social:  Former Smoker, No Alcohol Use       Hematology/Oncology:       -- Anemia:                                  Cardiovascular:     Hypertension   CAD    Dysrhythmias    Orthopnea   hyperlipidemia   ECG has been reviewed.   Cardiovascular Symptoms:    Shortness of Breath Dyspnea On Extertion Congestive Heart Failure Symptoms Orthopnea   Coronary Artery Disease:      S/P Percutaneous Coronary Intervention (PCI)       Valvular Heart Disease: Aortic Stenosis (AS), moderate, severe , CHRIS(cm2) = 0.97. Mitral Regurgitation (MR), mild, Tricuspid Regurgitation (TR), mild     Congestive Heart Failure (CHF) , Chronic Congestive Heart Failure , NYHA  Classification III  , uncompensated, LV Systolic HF, Rx recently increased  Decreased Myocardial Function/Contractility, severely decreased LVEF (< 25 %), determined by 2D echo                   Disorder of Cardiac Rhythm, Atrial Fibrillation, Chronic Atrial Fibrillation, Atrial Flutter, S/P electrical cardioversion, failed cardioversion  Disorder of Cardiac Conduction, Intraventricular Conduct Defect, Non-Specific Intraventricular Conduction Delay    Pulmonary:    Denies COPD.  Denies Asthma.  Shortness of breath   Denies Sleep Apnea.  Pulmonary Symptoms:  are shortness of breath at rest.                 Renal/:   Denies Chronic Renal Disease.                Hepatic/GI:      Denies GERD.             Neurological:    Denies CVA.    Denies Seizures.                                Endocrine:  Diabetes, type 2, using insulin           Psych:  Denies Psychiatric History.                  Physical Exam  General: Cooperative and Alert    Airway:  Mallampati: III   Mouth Opening: Normal  TM Distance: Normal  Tongue: Normal    Dental:  Periodontal disease, Partial Dentures    Most upper teeth missing, some lower teeth missing  No loose teeth per patient  Upper/lower partial dentures removed      Anesthesia Plan  Type of Anesthesia, risks & benefits discussed:    Anesthesia Type: Gen Natural Airway  Intra-op Monitoring Plan: Standard ASA Monitors  Post Op Pain Control Plan: multimodal analgesia and IV/PO Opioids PRN  Induction:  IV  Informed Consent: Informed consent signed with the Patient and all parties understand the risks and agree with anesthesia plan.  All questions answered.   ASA Score: 4  Day of Surgery Review of History & Physical: H&P Update referred to the surgeon/provider.  Anesthesia Plan Notes: Patient extremely high risk based upon EF 20% with severe AS and admitted with CHF exacerbation.  CXR with pulm vascular congestion.  On exam, patient breathing comfortably now and describes feeling subjectively back  to baseline respiratory status.        Ready For Surgery From Anesthesia Perspective.     .

## 2024-01-26 NOTE — DISCHARGE SUMMARY
[facility] - Emergency Dept  [facility] Medicine  Discharge Summary      Patient Name: Tej Purdy  MRN: [id number]  NORMA: [id number]  Patient Class: IP- Inpatient  Admission Date: 1/25/2024  Hospital Length of Stay: 1 days  Discharge Date and Time:  01/26/2024 4:44 PM  Attending Physician: Fabio Akins MD   Discharging Provider: Stalin Garces MD  Primary Care Provider: Thais Maxwell MD  [facility] Medicine Team: [facility] 2 Stalin Garces MD  Primary Care Team: [facility] 2    HPI:   Patient is a 67 yo man with a PMH of hypertension, diabetes mellitus type 2 on insulin, hyperlipidemia, nonalcoholic steatohepatitis, history of cirrhosis, history of cholangiocarcinoma, history of liver transplant on 1/6/2022 (immunosuppressed on tacrolimus), persistent atrial fibrillation status post cardioversion on 5/19/2021, 5/31/2021, 7/7/2021, 7/27/2021 (anticoagulated on apixaban), aortic stenosis, heart failure with reduced ejection fraction, and peripheral artery disease who presents with worsening shortness of breath and chest tightness for the past 3-4 days.  He states symptoms are similar to those he had when he was admitted in early January for CHF exacerbation.  He states that symptoms are worse with any exertion and are accompanied with severe fatigue for that fatigue has been chronic issue since his liver transplant 3 years ago.  Additionally he states that he has had diarrhea for the past 1 day and complains of minor abdominal pain that is also chronic but perhaps slightly worse than usual.  He states he is unable to lay flat and has had trouble sleeping for the past 2 nights.  He denies fevers, chills, nausea, vomiting, chest pain, lightheadedness, or dizziness.    Of note, patient was scheduled to have cardioversion with ASHISH the morning of 1/26 (day after admission).    While in the ED: Pt hypoxic in the 80s upon arrival in the ED. placed on 3 L nasal cannula with improvment. EKG without ST elevation.  BNP >900. Trop negative. CXR with evidence of pulmonary edema. Treated with lasix for suspected fluid overload.    Procedure(s) (LRB):  Cardioversion or Defibrillation (N/A)  Transesophageal echo (ASHISH) intra-procedure log documentation (N/A)      Hospital Course:   Pt admitted with acute hypoxic respiratory failure due to failure exacerbation in the setting of recurrent atrial fibrillation.  Initially required 3 liters/minute nasal cannula to maintain normal SpO2.  Started on 40mg lasix BID to adequate response.  Initial EKG demonstrated AFib with heart rate of 100.  Pt was scheduled to have ASHISH with cardioversion 1/26/24 prior to admission. Procedure able to be performed while inpt today. Pt sucessfully cardioverted. He is no longer hypoxic/dyspneic.  Able to comfortably lie flat.  Does not appear to be volume overloaded on exam.  He is medically stable to discharge. Explained to pt that it is a good idea to decrease midodrine dosage as this medication may worsen CHF (patient was mildly hypertensive during his stay). Follow-up with cardiac rehab will be necessary.  Patient may benefit from aortic valve replacement if his functional status continues to improve.  A referral to psychiatry was also ordered at the pt's request.     Goals of Care Treatment Preferences:  Code Status: Full Code      Consults:     No new Assessment & Plan notes have been filed under this hospital service since the last note was generated.  Service: Hospital Medicine    Final Active Diagnoses:    Diagnosis Date Noted POA    PRINCIPAL PROBLEM:  HFrEF (heart failure with reduced ejection fraction) [I50.20] 12/24/2023 Yes     Chronic    Cholangiocarcinoma [C22.1] 03/16/2022 Yes     Chronic    S/P liver transplant [Z94.4] 01/06/2022 Not Applicable     Chronic    Persistent atrial fibrillation [I48.19] 05/14/2021 Yes     Chronic    Coronary artery disease involving native coronary artery of native heart without angina pectoris [I25.10] 01/25/2021  "Yes     Chronic    Type 2 diabetes mellitus, with long-term current use of insulin [E11.9, Z79.4] 01/19/2021 Not Applicable     Chronic    Abdominal pain [R10.9]  Yes    Primary hypertension [I10] 11/23/2020 Yes     Chronic    Hyperlipidemia [E78.5] 11/23/2020 Yes      Problems Resolved During this Admission:       Discharged Condition: good    Disposition: Home or Self Care    Follow Up:  PCP, Cardiology, Psychiatry    Patient Instructions:      Ambulatory referral/consult to Psychiatry   Standing Status: Future   Referral Priority: Routine Referral Type: Psychiatric   Referral Reason: Specialty Services Required   Requested Specialty: Psychiatry   Number of Visits Requested: 1       Significant Diagnostic Studies: N/A    Pending Diagnostic Studies:       None           Medications:  Reconciled Home Medications:      Medication List        CHANGE how you take these medications      midodrine 10 MG tablet  Commonly known as: PROAMATINE  Take 0.5 tablets (5 mg total) by mouth 3 (three) times daily with meals.  What changed: how much to take            CONTINUE taking these medications      aspirin 81 MG EC tablet  Commonly known as: ECOTRIN  Take 81 mg by mouth once daily.     atorvastatin 80 MG tablet  Commonly known as: LIPITOR  Take 1 tablet (80 mg total) by mouth once daily.     BD ULTRA-FINE GÉNESIS PEN NEEDLE 32 gauge x 5/32" Ndle  Generic drug: pen needle, diabetic  Use to inject insulin 4 times daily     blood sugar diagnostic Strp  To check BG 2 times daily, to use with insurance preferred meter (patient has a [employer] self-monitoring glucose meter)     blood-glucose meter kit  To check BG 2 times daily, to use with insurance preferred meter     buPROPion 150 MG TB24 tablet  Commonly known as: WELLBUTRIN XL  Take 1 tablet (150 mg total) by mouth every morning.     calcium carbonate-vitamin D3 600 mg-20 mcg (800 unit) Tab  Take 1 tablet by mouth once daily.     clopidogreL 75 mg tablet  Commonly known as: " PLAVIX  Take 1 tablet (75 mg total) by mouth once daily.     ELIQUIS 5 mg Tab  Generic drug: apixaban  Take 1 tablet (5 mg total) by mouth 2 (two) times daily.     fish oil-omega-3 fatty acids 300-1,000 mg capsule  Take 1 capsule by mouth once daily.     gabapentin 300 MG capsule  Commonly known as: NEURONTIN  Take 1 capsule (300 mg total) by mouth 3 (three) times daily as needed (neuropathy).     HumaLOG KwikPen Insulin 100 unit/mL pen  Generic drug: insulin lispro  Inject 6 Units into the skin 3 (three) times daily with meals. Plus sliding scale, MDD: 48 units     lancets Misc  To check BG 2 times daily, to use with insurance preferred meter     LANTUS SOLOSTAR U-100 INSULIN glargine 100 units/mL SubQ pen  Generic drug: insulin  INJECT 22 UNITS INTO THE SKIN EVERY EVENING. ADJUST DOSE UNTIL AM GLUCOSE GOAL . MAX DOSE 30 UNITS/DY     magnesium oxide 400 mg (241.3 mg magnesium) tablet  Commonly known as: MAG-OX  Take 2 tablets (800 mg total) by mouth 3 (three) times daily.     metoprolol succinate 25 MG 24 hr tablet  Commonly known as: TOPROL-XL  Take 1 tablet (25 mg total) by mouth once daily.     multivitamin capsule  Take 1 capsule by mouth once daily.     ondansetron 4 MG Tbdl  Commonly known as: ZOFRAN-ODT  Dissolve 1 tablet (4 mg total) by mouth every 8 (eight) hours as needed (nausea).     pantoprazole 40 MG tablet  Commonly known as: PROTONIX  Take 1 tablet (40 mg total) by mouth once daily.     QUEtiapine 100 MG Tab  Commonly known as: SEROQUEL  Take 1 tablet (100 mg total) by mouth every evening.     tacrolimus 0.5 MG Cap  Commonly known as: PROGRAF  Take 1 capsule (0.5 mg total) by mouth every 12 (twelve) hours.            STOP taking these medications      sertraline 25 MG tablet  Commonly known as: ZOLOFT              Indwelling Lines/Drains at time of discharge:   Lines/Drains/Airways       None                   Time spent on the discharge of patient: 40 minutes         Stalin Garces  MD  Department of Hospital Medicine  [facility] - Emergency Dept

## 2024-01-26 NOTE — ED NOTES
Per CN, nurse from  states patient's cardioversion has now been canceled. Patient transported back to ED at this time.

## 2024-01-26 NOTE — ED PROVIDER NOTES
"Encounter Date: 1/25/2024       History     Chief Complaint   Patient presents with    Shortness of Breath     Pt reporting shortness of breath that started last night + states he had difficulty sleeping/lying flat. 86% on room air. Pt yelling, "I'm having a heart attack" while being wheeled into the ED front doors. Cardioversion scheduled tomorrow. Hx liver transplant.     Pt is a 68 year old male with hx of HTN, DM, atiral fibrillation who presents for evaluation of shortness of breath, which has worsened over the last 3 days. States symptoms worsen with exertion and lying flat. Associated transient left sided chest pain. Hx of similar symptoms 1 month ago. Admitted at that time for CHF exacerbation. No fever, chills, nausea, vomiting, or abdominal pain    The history is provided by the patient.     Review of patient's allergies indicates:   Allergen Reactions    Bee pollens Swelling     BEE STINGS swells body     Past Medical History:   Diagnosis Date    Acute appendicitis 06/02/2023    Aortic stenosis     Atrial fibrillation     johny/cardioversion on 1/26/24    Calculus of ureter 12/22/2023    CHF (congestive heart failure)     Cholangiocarcinoma 03/16/2022    s/p liver transplant    Cirrhosis 11/23/2020    s/p transplant 2022 for CASTILLO cirrhosis and cholangiocarcinoma    Diabetes mellitus, type 2     c/b Diabetic neuropathy    HTN (hypertension) 11/23/2020    Hyperlipidemia 11/23/2020    Kidney stones 11/23/2020    S/p lithotripsy 2020    PAD (peripheral artery disease)     Skin cancer 11/23/2020     Past Surgical History:   Procedure Laterality Date    ANGIOGRAM, CORONARY, WITH LEFT HEART CATHETERIZATION N/A 12/26/2023    Procedure: Angiogram, Coronary, with Left Heart Cath;  Surgeon: Carlos King MD;  Location: [facility] CATH LAB;  Service: Cardiology;  Laterality: N/A;    AORTIC VALVULOPLASTY N/A 2/1/2024    Procedure: REPAIR, AORTIC VALVE;  Surgeon: Shilo Carrasco MD;  Location: [facility] CATH LAB;  " Service: Cardiology;  Laterality: N/A;    COLONOSCOPY  10/2020    ECHOCARDIOGRAM,TRANSESOPHAGEAL N/A 1/26/2024    Procedure: Transesophageal echo (ASHISH) intra-procedure log documentation;  Surgeon: Nick Mathur III, MD;  Location: [facility] EP LAB;  Service: Cardiology;  Laterality: N/A;    ESOPHAGEAL MANOMETRY WITH MEASUREMENT OF IMPEDANCE N/A 7/17/2023    Procedure: MANOMETRY, ESOPHAGUS, WITH IMPEDANCE MEASUREMENT;  Surgeon: Klarissa Zamora MD;  Location: [facility] ENDO (4TH FLR);  Service: Gastroenterology;  Laterality: N/A;  pt diabetic  liver txp  prep insructions sent to pt via email and portal -Jm    ESOPHAGOGASTRODUODENOSCOPY  10/2020    ESOPHAGOGASTRODUODENOSCOPY N/A 7/1/2021    Procedure: EGD (ESOPHAGOGASTRODUODENOSCOPY);  Surgeon: Jovan David MD;  Location: [facility] ENDO (4TH FLR);  Service: Endoscopy;  Laterality: N/A;  HCC. Listed for liver transplant. EGD for variceal surveillance.  cardiac clearance and blood thinenr approval received, see telephone encounter 6/23/21-BB  fully vaccinated-BB  labs same day of procedure-BB    EXCISION OF HYDROCELE Right     hemorroid surgery      hemorroidectomy      KIDNEY STONE SURGERY      LAPAROSCOPIC APPENDECTOMY N/A 6/2/2023    Procedure: APPENDECTOMY, LAPAROSCOPIC;  Surgeon: Shan Ross MD;  Location: [facility] OR 2ND FLR;  Service: General;  Laterality: N/A;    LEFT HEART CATHETERIZATION N/A 1/27/2021    Procedure: Left heart cath;  Surgeon: Kris Shelton MD;  Location: [facility] CATH LAB;  Service: Cardiology;  Laterality: N/A;    liver mass removal      LIVER TRANSPLANT N/A 1/6/2022    Procedure: TRANSPLANT, LIVER;  Surgeon: Lizet Garcia MD;  Location: [facility] OR 2ND FLR;  Service: Transplant;  Laterality: N/A;    RIGHT HEART CATHETERIZATION Right 5/7/2021    Procedure: INSERTION, CATHETER, RIGHT HEART;  Surgeon: Jaden Schuster MD;  Location: [facility] CATH LAB;  Service: Cardiology;  Laterality: Right;    STENT, DRUG ELUTING, SINGLE VESSEL, CORONARY  12/26/2023     Procedure: Stent, Drug Eluting, Single Vessel, Coronary;  Surgeon: Carlos King MD;  Location: [facility] CATH LAB;  Service: Cardiology;;    TREATMENT OF CARDIAC ARRHYTHMIA N/A 2021    Procedure: CARDIOVERSION;  Surgeon: Didier Tilley MD;  Location: [facility] EP LAB;  Service: Cardiology;  Laterality: N/A;  a fib, dccv/johny, mac, dm. sscu    TREATMENT OF CARDIAC ARRHYTHMIA N/A 2021    Procedure: CARDIOVERSION;  Surgeon: Daniel Arambula MD;  Location: [facility] EP LAB;  Service: Cardiology;  Laterality: N/A;  afib, DCCV, anest., DM, 3 prep    TREATMENT OF CARDIAC ARRHYTHMIA N/A 2021    Procedure: Cardioversion or Defibrillation;  Surgeon: Didier Tilley MD;  Location: [facility] EP LAB;  Service: Cardiology;  Laterality: N/A;  AF, JOHNY (cancel if complaint), DCCV, MAC, DM, 3 Prep    TREATMENT OF CARDIAC ARRHYTHMIA N/A 2021    Procedure: Cardioversion or Defibrillation;  Surgeon: Didier Tilley MD;  Location: [facility] EP LAB;  Service: Cardiology;  Laterality: N/A;  AF, JOHNY (cx if complaint), DCCV, MAC, DM, 3 Prep    TREATMENT OF CARDIAC ARRHYTHMIA N/A 2024    Procedure: Cardioversion or Defibrillation;  Surgeon: Koko Knight MD;  Location: [facility] EP LAB;  Service: Cardiology;  Laterality: N/A;  AF, JOHNY, DCCV, MAC, DM, 3 Prep *ADENOSINE TEST* *LIVER TRANSPLANT PATIENT*     Family History   Problem Relation Age of Onset    Coronary artery disease Father     Colon cancer Neg Hx     Esophageal cancer Neg Hx      Social History     Tobacco Use    Smoking status: Former     Current packs/day: 0.00     Types: Cigarettes     Quit date: 1980     Years since quittin.1    Smokeless tobacco: Former   Substance Use Topics    Alcohol use: Not Currently    Drug use: Never     Review of Systems    Physical Exam     Initial Vitals [24 1715]   BP Pulse Resp Temp SpO2   (!) 142/90 103 18 97.6 °F (36.4 °C) (!) 86 %      MAP       --         Physical Exam    Nursing note and vitals reviewed.  Constitutional:  He appears well-developed and well-nourished. No distress.   HENT:   Head: Normocephalic and atraumatic.   Eyes: Conjunctivae and EOM are normal. Pupils are equal, round, and reactive to light.   Neck: Neck supple. No tracheal deviation present.   Normal range of motion.  Cardiovascular:  Normal rate and intact distal pulses. An irregularly irregular rhythm present.           No murmur heard.  Pulmonary/Chest: No stridor. No respiratory distress. He has no wheezes. He has rales.   Abdominal: Abdomen is soft. He exhibits no distension. There is no abdominal tenderness.   Musculoskeletal:         General: Edema present. Normal range of motion.      Cervical back: Normal range of motion and neck supple.     Neurological: He is alert and oriented to person, place, and time. He has normal strength. No cranial nerve deficit or sensory deficit.   Skin: Skin is warm and dry. Capillary refill takes less than 2 seconds.         ED Course   Procedures  Labs Reviewed   CBC W/ AUTO DIFFERENTIAL - Abnormal; Notable for the following components:       Result Value    Hemoglobin 9.7 (*)     Hematocrit 36.0 (*)     MCV 72 (*)     MCH 19.4 (*)     MCHC 26.9 (*)     RDW 20.3 (*)     Immature Granulocytes 0.6 (*)     Gran # (ANC) 10.1 (*)     Immature Grans (Abs) 0.07 (*)     Lymph # 0.7 (*)     Mono # 1.4 (*)     Gran % 80.9 (*)     Lymph % 5.6 (*)     All other components within normal limits   COMPREHENSIVE METABOLIC PANEL - Abnormal; Notable for the following components:    CO2 21 (*)     Glucose 215 (*)     BUN 27 (*)     Total Bilirubin 1.2 (*)     Alkaline Phosphatase 146 (*)     All other components within normal limits   B-TYPE NATRIURETIC PEPTIDE - Abnormal; Notable for the following components:     (*)     All other components within normal limits   HEMOGLOBIN A1C - Abnormal; Notable for the following components:    Hemoglobin A1C 7.5 (*)     Estimated Avg Glucose 169 (*)     All other components within normal limits    CBC W/ AUTO DIFFERENTIAL - Abnormal; Notable for the following components:    RBC 4.57 (*)     Hemoglobin 8.7 (*)     Hematocrit 32.2 (*)     MCV 71 (*)     MCH 19.0 (*)     MCHC 27.0 (*)     RDW 19.9 (*)     Immature Granulocytes 0.6 (*)     Immature Grans (Abs) 0.05 (*)     Mono # 1.1 (*)     Lymph % 11.8 (*)     All other components within normal limits   COMPREHENSIVE METABOLIC PANEL - Abnormal; Notable for the following components:    Glucose 176 (*)     BUN 25 (*)     Albumin 3.4 (*)     Total Bilirubin 1.1 (*)     All other components within normal limits   ISTAT PROCEDURE - Abnormal; Notable for the following components:    POC PH 7.519 (*)     POC PCO2 24.7 (*)     POC PO2 116 (*)     POC HCO3 20.1 (*)     POC BE -3 (*)     POC TCO2 21 (*)     All other components within normal limits   INFLUENZA A & B BY MOLECULAR   TROPONIN I   SARS-COV-2 RNA AMPLIFICATION, QUAL   LACTIC ACID, PLASMA   PROCALCITONIN   LACTIC ACID, PLASMA   MAGNESIUM   PHOSPHORUS   TACROLIMUS LEVEL        ECG Results              EKG 12-lead (Final result)  Result time 01/26/24 13:23:05      Final result by Interface, Lab In [place] (01/26/24 13:23:05)                   Narrative:    Test Reason : I48.92,    Vent. Rate : 072 BPM     Atrial Rate : 072 BPM     P-R Int : 176 ms          QRS Dur : 128 ms      QT Int : 454 ms       P-R-T Axes : 061 -48 057 degrees     QTc Int : 497 ms    Normal sinus rhythm  Left axis deviation  Nonspecific intraventricular block  Minimal voltage criteria for LVH, may be normal variant ( [place] product )  Nonspecific T wave abnormality  Abnormal ECG  When compared with ECG of 25-JAN-2024 19:42,  Sinus rhythm has replaced Atrial fibrillation  Confirmed by Nick Mathur MD ([id number]) on 1/26/2024 1:22:57 PM    Referred By: [email or web address]   SELF           Confirmed By:Nick Mathur MD                                     EKG 12-lead (Final result)  Result time 01/26/24 11:47:55      Final result by Interface, Lab In  [facility] (01/26/24 11:47:55)                   Narrative:    Test Reason : R06.02,    Vent. Rate : 100 BPM     Atrial Rate : 101 BPM     P-R Int : 000 ms          QRS Dur : 130 ms      QT Int : 420 ms       P-R-T Axes : 000 -51 081 degrees     QTc Int : 541 ms    Atrial fibrillation with PVC  Left axis deviation  Nonspecific intraventricular block  Abnormal ECG  When compared with ECG of 25-JAN-2024 17:23,  Atrial fibrillation has replaced Sinus rhythm  Confirmed by Korina Talley MD ([id number]) on 1/26/2024 11:47:49 AM    Referred By: [email or web address]   SELF           Confirmed By:Korina Talley MD                                     EKG 12-lead (Final result)  Result time 01/26/24 11:38:17      Final result by Interface, Lab In [facility] (01/26/24 11:38:17)                   Narrative:    Test Reason : R06.02,    Vent. Rate : 103 BPM     Atrial Rate : 103 BPM     P-R Int : 232 ms          QRS Dur : 128 ms      QT Int : 384 ms       P-R-T Axes : 000 -56 071 degrees     QTc Int : 503 ms    Sinus tachycardia with 1st degree A-V block  Left axis deviation  Nonspecific intraventricular block  Minimal voltage criteria for LVH, may be normal variant ( [place] product )  Abnormal ECG  When compared with ECG of 24-JAN-2024 12:54,  Sinus rhythm has replaced Wide QRS rhythm  Confirmed by Korina Talley MD ([id number]) on 1/26/2024 11:38:09 AM    Referred By: [email or web address]   SELF           Confirmed By:Korina Talley MD                                  Imaging Results              X-Ray Chest AP Portable (Final result)  Result time 01/25/24 20:14:02      Final result by Jan Narvaez DO (01/25/24 20:14:02)                   Impression:      Bibasilar airspace opacities with perihilar interstitial prominence and pulmonary vascular congestion.  Findings are compatible with pulmonary edema/CHF.      Electronically signed by: Jan Narvaez  Date:    01/25/2024  Time:    20:14               Narrative:    EXAMINATION:  XR CHEST AP  PORTABLE    CLINICAL HISTORY:  CHF;    TECHNIQUE:  Single frontal view of the chest was performed.    COMPARISON:  12/29/2023.    FINDINGS:  There are bibasilar airspace opacities with perihilar interstitial prominence and pulmonary vascular congestion.  The pleural spaces are clear.  The cardiac silhouette is enlarged.  There are calcifications of the aortic arch.  Osseous structures are intact.                                       Medications   furosemide injection 80 mg (80 mg Intravenous Given 1/25/24 2057)     Medical Decision Making  Pt presents for shortness of breath. Pt hypoxic in the 80s upon arrival in the ED. Placed on 3 L nasal cannula with improvment. EKG without ST elevation. Trop negative. CXR with evidence of pulmonary edema. BNP elevated. Evaluation consistent with CHF exacerbation. Treated with lasix in the ED. Pt admitted to hospital medicine for further management and evaluation     Amount and/or Complexity of Data Reviewed  Labs: ordered.  Radiology: ordered.    Risk  Prescription drug management.  Decision regarding hospitalization.              Attending Attestation:   Physician Attestation Statement for Resident:  As the supervising MD   Physician Attestation Statement: I have personally seen and examined this patient.   I agree with the above history.  -: Patient notes shortness of breath has recurrent inconsistent with previous CHF.  Worsened by lying flat.  86% on room air but more comfortable on nasal cannula.  He is bibasilar crackles.  Presentation is consistent with acute CHF exacerbation.  Will diurese and admit.   As the supervising MD I agree with the above PE.     As the supervising MD I agree with the above treatment, course, plan, and disposition.            Attending Critical Care:   Critical Care Times:   ==============================================================  Total Critical Care Time - exclusive of procedural time: 30  minutes.  ==============================================================  Critical care was necessary to treat or prevent imminent or life-threatening deterioration of the following conditions: congestive heart failure.                                  Clinical Impression:  Final diagnoses:  [R06.02] Shortness of breath  [I50.9] Congestive heart failure, unspecified HF chronicity, unspecified heart failure type (Primary)          ED Disposition Condition    Admit Stable                  Bruno Aiken Jr., MD  Resident  01/26/24 0023       Robert Monsalve MD  02/29/24 [id number]

## 2024-01-26 NOTE — CONSULTS
[facility], [address]  ASHISH Consult      Tej Purdy  YOB: 1955  Medical Record Number:  [id number]  Attending Physician:  Fabio Akins MD   Date of Admission: 1/25/2024       Hospital Day:  1  Current Principal Problem:  HFrEF (heart failure with reduced ejection fraction)      History     Cc: afib    Mr. Purdy is a 68 y.o. male with HTN, HLD, DM2 , PAD, HCC s/p TACE/RFA (12/2020), CAD (PCI 12/2023) and persistent AF here for DCCV     Background:     OLT 1/6/2021  On 5/7/21, Mr. Purdy presented for RHC and pre-procedure ECG showed AF with RVR. He was relatively asymptomatic, noting some mild dizziness. He was subsequently started on metoprolol 25 mg ER daily and apixaban 5 mg bid. He was referred to EP.      At initial EP visit, ECG showed AF with RVR. Metoprolol was increased to 50 mg daily and ASHISH/DCCV recommended. On 5/19/21, Mr. Neumann underwent successful cardioversion and was started on flecainide 100 mg bid at discharge. Several days later on 5/24/21 he presented to the ED with SOB and HTN, resulting in flash pulmonary edema. He was started on lasix. He returned to [facility] on 5/31/21 for ASHISH/DCCV. He had restoration of sinus rhythm and flecainide was increased to 150 mg bid. Approximately one week later, Mr. Neumann was seen in cardiology clinic where he was found to be hypotensive with NEVIN due to over diuresis.  He was sent to the ED where ECG showed AF. Flecainide and lasix were stopped.       Dysphagia or odynophagia:  no  Liver Disease, esophageal disease, or known varices:  no  Upper GI Bleeding: no  Snoring:  no  Sleep Apnea:  no  Prior neck surgery or radiation:  no  History of anesthetic difficulties:  no  Family history of anesthetic difficulties:  no  Last oral intake: last pm   Able to move neck in all directions: yes  Platelet count: 167         Medications - Outpatient  Prior to Admission medications    Medication Sig Start Date End Date Taking?  Authorizing Provider   atorvastatin (LIPITOR) 80 MG tablet Take 1 tablet (80 mg total) by mouth once daily. 1/3/24 1/2/25 Yes Sae Pearl MD   buPROPion (WELLBUTRIN XL) 150 MG TB24 tablet Take 1 tablet (150 mg total) by mouth every morning. 1/19/24  Yes Thais Maxwell MD   clopidogreL (PLAVIX) 75 mg tablet Take 1 tablet (75 mg total) by mouth once daily. 1/3/24 1/2/25 Yes Sae Pearl MD   gabapentin (NEURONTIN) 300 MG capsule Take 1 capsule (300 mg total) by mouth 3 (three) times daily as needed (neuropathy). 1/23/23  Yes Carlos Argueta MD   apixaban (ELIQUIS) 5 mg Tab Take 1 tablet (5 mg total) by mouth 2 (two) times daily. 9/11/23   Thais Maxwell MD   aspirin (ECOTRIN) 81 MG EC tablet Take 81 mg by mouth once daily.    Provider, Historical   blood sugar diagnostic Strp To check BG 2 times daily, to use with insurance preferred meter (patient has a TrueMetrix self-monitoring glucose meter) 2/16/22   Carlos Argueta MD   blood-glucose meter kit To check BG 2 times daily, to use with insurance preferred meter 7/16/21   Carlos Argueta MD   calcium carbonate-vitamin D3 600 mg-20 mcg (800 unit) Tab Take 1 tablet by mouth once daily. 1/13/22   Werner Zamarripa MD   insulin (LANTUS SOLOSTAR U-100 INSULIN) glargine 100 units/mL SubQ pen INJECT 22 UNITS INTO THE SKIN EVERY EVENING. ADJUST DOSE UNTIL AM GLUCOSE GOAL . MAX DOSE 30 UNITS/DY 11/25/22   RAIMUNDO Ventura PA-C   insulin lispro (HUMALOG KWIKPEN INSULIN) 100 unit/mL pen Inject 6 Units into the skin 3 (three) times daily with meals. Plus sliding scale, MDD: 48 units 3/8/23 3/7/24  Carlos Argueta MD   lancets [facility] To check BG 2 times daily, to use with insurance preferred meter 2/16/22   Carlos Argueta MD   magnesium oxide (MAG-OX) 400 mg (241.3 mg magnesium) tablet Take 2 tablets (800 mg total) by mouth 3 (three) times daily. 7/17/23   Santana Finley MD   metoprolol succinate (TOPROL-XL) 25 MG 24 hr tablet Take 1  "tablet (25 mg total) by mouth once daily. 1/24/24 1/23/25  Hilary Denins NP   midodrine (PROAMATINE) 10 MG tablet Take 1 tablet (10 mg total) by mouth 3 (three) times daily with meals. 1/2/24 1/1/25  Sae Pearl MD   multivitamin capsule Take 1 capsule by mouth once daily.    Provider, Historical   omega-3 fatty acids/fish oil (FISH OIL-OMEGA-3 FATTY ACIDS) 300-1,000 mg capsule Take 1 capsule by mouth once daily.     Provider, Historical   ondansetron (ZOFRAN-ODT) 4 MG TbDL Dissolve 1 tablet (4 mg total) by mouth every 8 (eight) hours as needed (nausea). 3/29/23   Thais Maxwell MD   pantoprazole (PROTONIX) 40 MG tablet Take 1 tablet (40 mg total) by mouth once daily. 7/24/23   Thais Maxwell MD   pen needle, diabetic (BD ULTRA-FINE GÉNESIS PEN NEEDLE) 32 gauge x 5/32" Ndle Use to inject insulin 4 times daily 12/19/22   Carlos Argueta MD   QUEtiapine (SEROQUEL) 100 MG Tab Take 1 tablet (100 mg total) by mouth every evening. 12/11/23   Thais Maxwell MD   sertraline (ZOLOFT) 25 MG tablet Take 1 tablet (25 mg total) by mouth once daily. 8/18/23   Werner Zamarripa MD   tacrolimus (PROGRAF) 0.5 MG Cap Take 1 capsule (0.5 mg total) by mouth every 12 (twelve) hours. 1/4/24   Thais Maxwell MD         Medications - Current  Scheduled Meds:   apixaban  5 mg Oral BID    atorvastatin  80 mg Oral Daily    clopidogreL  75 mg Oral Daily    furosemide (LASIX) injection  40 mg Intravenous BID    metoprolol succinate  25 mg Oral Daily    midodrine  5 mg Oral TID WM    pantoprazole  40 mg Oral Daily    tacrolimus  0.5 mg Oral BID     Continuous Infusions:  PRN Meds:.dextrose 10%, dextrose 10%, glucagon (human recombinant), glucose, glucose, insulin aspart U-100, sodium chloride 0.9%      Allergies  Review of patient's allergies indicates:   Allergen Reactions    Bee pollens Swelling     BEE STINGS swells body         Past Medical History  Past Medical History:   Diagnosis Date    Acute appendicitis " 06/02/2023    Atrial fibrillation     Calculus of ureter 12/22/2023    Cholangiocarcinoma 03/16/2022    Cirrhosis 11/23/2020    Diabetes mellitus, type 2     Diabetic neuropathy 11/24/2020    Disorder of kidney and ureter     HTN (hypertension) 11/23/2020    Hyperlipidemia 11/23/2020    Kidney stones 11/23/2020    S/p lithotripsy 2020    Leukocytosis 01/15/2021    Liver mass 11/23/2020    CASTILLO (nonalcoholic steatohepatitis) 11/23/2020    Nausea and vomiting 08/21/2023    Other ascites 03/16/2022    PAD (peripheral artery disease)     Portal hypertension 11/23/2020    Skin cancer 11/23/2020         Past Surgical History  Past Surgical History:   Procedure Laterality Date    ANGIOGRAM, CORONARY, WITH LEFT HEART CATHETERIZATION N/A 12/26/2023    Procedure: Angiogram, Coronary, with Left Heart Cath;  Surgeon: Carlos King MD;  Location: [facility] CATH LAB;  Service: Cardiology;  Laterality: N/A;    COLONOSCOPY  10/2020    ESOPHAGEAL MANOMETRY WITH MEASUREMENT OF IMPEDANCE N/A 7/17/2023    Procedure: MANOMETRY, ESOPHAGUS, WITH IMPEDANCE MEASUREMENT;  Surgeon: Klarissa Zamora MD;  Location: [facility] (4TH FLR);  Service: Gastroenterology;  Laterality: N/A;  pt diabetic  liver txp  prep insructions sent to pt via email and portal -    ESOPHAGOGASTRODUODENOSCOPY  10/2020    ESOPHAGOGASTRODUODENOSCOPY N/A 7/1/2021    Procedure: EGD (ESOPHAGOGASTRODUODENOSCOPY);  Surgeon: Jovan David MD;  Location: [facility] (4TH FLR);  Service: Endoscopy;  Laterality: N/A;  HCC. Listed for liver transplant. EGD for variceal surveillance.  cardiac clearance and blood thinenr approval received, see telephone encounter 6/23/21-BB  fully vaccinated-BB  labs same day of procedure-BB    EXCISION OF HYDROCELE Right     hemorroid surgery      hemorroidectomy      KIDNEY STONE SURGERY      LAPAROSCOPIC APPENDECTOMY N/A 6/2/2023    Procedure: APPENDECTOMY, LAPAROSCOPIC;  Surgeon: Shan Ross MD;  Location: [facility] OR [facility] FLR;  Service:  General;  Laterality: N/A;    LEFT HEART CATHETERIZATION N/A 2021    Procedure: Left heart cath;  Surgeon: Kris Shelton MD;  Location: [facility] CATH LAB;  Service: Cardiology;  Laterality: N/A;    liver mass removal      LIVER TRANSPLANT N/A 2022    Procedure: TRANSPLANT, LIVER;  Surgeon: Lizet Garcia MD;  Location: [facility] OR 2ND FLR;  Service: Transplant;  Laterality: N/A;    RIGHT HEART CATHETERIZATION Right 2021    Procedure: INSERTION, CATHETER, RIGHT HEART;  Surgeon: Jaden Schuster MD;  Location: [facility] CATH LAB;  Service: Cardiology;  Laterality: Right;    STENT, DRUG ELUTING, SINGLE VESSEL, CORONARY  2023    Procedure: Stent, Drug Eluting, Single Vessel, Coronary;  Surgeon: Carlos King MD;  Location: [facility] CATH LAB;  Service: Cardiology;;    TREATMENT OF CARDIAC ARRHYTHMIA N/A 2021    Procedure: CARDIOVERSION;  Surgeon: Didier Tilley MD;  Location: [facility] EP LAB;  Service: Cardiology;  Laterality: N/A;  a fib, dccv/johny, mac, dm. sscu    TREATMENT OF CARDIAC ARRHYTHMIA N/A 2021    Procedure: CARDIOVERSION;  Surgeon: Danile Arambula MD;  Location: [facility] EP LAB;  Service: Cardiology;  Laterality: N/A;  afib, DCCV, anest., DM, 3 prep    TREATMENT OF CARDIAC ARRHYTHMIA N/A 2021    Procedure: Cardioversion or Defibrillation;  Surgeon: Didier Tilley MD;  Location: [facility] EP LAB;  Service: Cardiology;  Laterality: N/A;  AF, JOHNY (cancel if complaint), DCCV, MAC, DM, 3 Prep    TREATMENT OF CARDIAC ARRHYTHMIA N/A 2021    Procedure: Cardioversion or Defibrillation;  Surgeon: Didier Tilley MD;  Location: [facility] EP LAB;  Service: Cardiology;  Laterality: N/A;  AF, JOHNY (cx if complaint), DCCV, MAC, DM, 3 Prep         Social History  Social History     Socioeconomic History    Marital status:    Tobacco Use    Smoking status: Former     Current packs/day: 0.00     Types: Cigarettes     Quit date: 1980     Years since quittin.0    Smokeless tobacco: Former    Substance and Sexual Activity    Alcohol use: Not Currently    Drug use: Never     Social Determinants of Health     Financial Resource Strain: Low Risk  (1/22/2024)    Overall Financial Resource Strain (CARDIA)     Difficulty of Paying Living Expenses: Not very hard   Food Insecurity: No Food Insecurity (1/22/2024)    Hunger Vital Sign     Worried About Running Out of Food in the Last Year: Never true     Ran Out of Food in the Last Year: Never true   Transportation Needs: No Transportation Needs (1/22/2024)    PRAPARE - Transportation     Lack of Transportation (Medical): No     Lack of Transportation (Non-Medical): No   Physical Activity: Inactive (1/22/2024)    Exercise Vital Sign     Days of Exercise per Week: 0 days     Minutes of Exercise per Session: 0 min   Stress: No Stress Concern Present (1/22/2024)    [employer] [place] [employer] - Occupational Stress Questionnaire     Feeling of Stress : Only a little   Recent Concern: Stress - Stress Concern Present (12/26/2023)    [employer] [place] [employer] - Occupational Stress Questionnaire     Feeling of Stress : Rather much   Social Connections: Moderately Isolated (1/22/2024)    Social Connection and Isolation Panel [NHANES]     Frequency of Communication with Friends and Family: Once a week     Frequency of Social Gatherings with Friends and Family: Once a week     Attends Spiritism Services: Never     Active Member of Clubs or Organizations: No     Attends Club or Organization Meetings: 1 to 4 times per year     Marital Status: Living with partner   Housing Stability: Low Risk  (1/22/2024)    Housing Stability Vital Sign     Unable to Pay for Housing in the Last Year: No     Number of Places Lived in the Last Year: 1     Unstable Housing in the Last Year: No         ROS  10 point ROS performed and negative except as stated in HPI     Physical Examination         Vital Signs  Vitals  Temp: 98.6 °F (37 °C)  Temp Source: Oral  Pulse:  "95  Heart Rate Source: Monitor  Resp: 19  SpO2: 97 %  Pulse Oximetry Type: Continuous  Flow (L/min): 1  BP: 125/83  MAP (mmHg): 100  BP Location: Right arm  BP Method: Automatic  Patient Position: Lying          24 Hour VS Range    Temp:  [97.6 °F (36.4 °C)-98.6 °F (37 °C)]   Pulse:  []   Resp:  [12-22]   BP: (117-162)/()   SpO2:  [86 %-100 %]   No intake or output data in the 24 hours ending 01/26/24 0941      Physical Exam:   Constitutional: no acute distress  HEENT: NCAT, EOMI, no scleral icterus  Cardiovascular: irRegular rate and rhythm  Pulmonary: Normal respiratory effort   Abdomen: nontender, non-distended   Neuro: alert and oriented, no focal deficits  Extremities: warm, no edema   MSK: no deformities  Integument: intact, no rashes         Data       Recent Labs   Lab 01/25/24 2004 01/26/24 0427   WBC 12.42 8.78   HGB 9.7* 8.7*   HCT 36.0* 32.2*    167        No results for input(s): "PROTIME", "INR" in the last 168 hours.     Recent Labs   Lab 01/25/24 2004 01/26/24  0427    139   K 5.0 3.9    104   CO2 21* 27   BUN 27* 25*   CREATININE 1.1 1.2   ANIONGAP 12 8   CALCIUM 8.9 8.9        Recent Labs   Lab 01/25/24 2004 01/26/24 0427   PROT 7.4 6.7   ALBUMIN 3.6 3.4*   BILITOT 1.2* 1.1*   ALKPHOS 146* 131   AST 35 21   ALT 27 22        Recent Labs   Lab 01/25/24 2004   TROPONINI 0.016        BNP (pg/mL)   Date Value   01/25/2024 952 (H)   12/29/2023 743 (H)   12/22/2023 539 (H)   04/11/2022 91   02/01/2022 45       No results for input(s): "LABBLOO" in the last 168 hours.         Assessment & Plan     #Afib  -ASHISH pre DCCV    TTE 12/2023    Left Ventricle: The left ventricle is normal in size. Mildly increased wall thickness. There is concentric remodeling. There is severely reduced systolic function with a visually estimated ejection fraction of 20 - 25%. All walls except for the basal septum are hypokinetic. When directly compared to images from 7/5/2022 there has been a " significant change in left ventricular systolic function.    LV Diastolic function: Unable to assess diastolic function due to E-A fusion. Average E/e' ratio is 11.    Right Ventricle: Normal right ventricular cavity size. Wall thickness is normal. Right ventricle wall motion  is normal. Systolic function is normal.    Left Atrium: Left atrium is severely dilated.    Aortic Valve: There is moderate to severe low gradient aortic stenosis. Aortic valve area by VTI is 0.97 cm². LVOT diameter is 2.4 cm. Aortic valve peak velocity is 3.24 m/s. Mean gradient is 28 mmHg. The dimensionless index is 0.21. There is no significant regurgitation.    Mitral Valve: There is mild to moderate regurgitation.    Tricuspid Valve: There is mild to moderate regurgitation.    Pulmonary Artery: The estimated pulmonary artery systolic pressure is 38 mm Hg.    IVC/SVC: Low central venous pressure.    Pericardium: There is a very small circumferential effusion; slightly more fluid is located laterally.      -No absolute contraindications of esophageal stricture, tumor, perforation, laceration,or diverticulum and/or active GI bleed  -The risks, benefits & alternatives of the procedure were explained to the patient.   -The risks of transesophageal echo include but are not limited to:  Dental trauma, esophageal trauma/perforation, bleeding, laryngospasm/brochospasm, aspiration, sore throat/hoarseness, & dislodgement of the endotracheal tube/nasogastric tube (where applicable).    -The risks of moderate sedation include hypotension, respiratory depression, arrhythmias, bronchospasm, & death.    -Informed consent was obtained. The patient is agreeable to proceed with the procedure and all questions and concerns addressed    Elfego Gonsalez MD  [facility]   Cardiovascular Disease PGY-V

## 2024-01-26 NOTE — H&P
"[facility]  Hospital Medicine  History & Physical    Patient Name: Tej Purdy  MRN: [id number]  Patient Class: IP- Inpatient  Admission Date: 1/25/2024  Attending Physician: Fabio Akins MD   Primary Care Provider: Thais Maxwell MD         Patient information was obtained from patient and ER records.     Subjective:     Principal Problem:HFrEF (heart failure with reduced ejection fraction)    Chief Complaint:   Chief Complaint   Patient presents with    Shortness of Breath     Pt reporting shortness of breath that started last night + states he had difficulty sleeping/lying flat. 86% on room air. Pt yelling, "I'm having a heart attack" while being wheeled into the ED front doors. Cardioversion scheduled tomorrow. Hx liver transplant.        HPI: Patient is a 67 yo man with a PMH of hypertension, diabetes mellitus type 2 on insulin, hyperlipidemia, nonalcoholic steatohepatitis, history of cirrhosis, history of cholangiocarcinoma, history of liver transplant on 1/6/2022 (immunosuppressed on tacrolimus), persistent atrial fibrillation status post cardioversion on 5/19/2021, 5/31/2021, 7/7/2021, 7/27/2021 (anticoagulated on apixaban), aortic stenosis, heart failure with reduced ejection fraction, and peripheral artery disease who presents with worsening shortness of breath and chest tightness for the past 3-4 days.  He states symptoms are similar to those he had when he was admitted in early January for CHF exacerbation.  He states that symptoms are worse with any exertion and are accompanied with severe fatigue for that fatigue has been chronic issue since his liver transplant 3 years ago.  Additionally he states that he has had diarrhea for the past 1 day and complains of minor abdominal pain that is also chronic but perhaps slightly worse than usual.  He states he is unable to lay flat and has had trouble sleeping for the past 2 nights.  He denies fevers, chills, nausea, vomiting, " chest pain, lightheadedness, or dizziness.    Of note, patient was scheduled to have cardioversion with ASHISH the morning of 1/26 (day after admission).    While in the ED: Pt hypoxic in the 80s upon arrival in the ED. placed on 3 L nasal cannula with improvment. EKG without ST elevation. BNP >900. Trop negative. CXR with evidence of pulmonary edema. Treated with lasix for suspected fluid overload.    Past Medical History:   Diagnosis Date    Acute appendicitis 06/02/2023    Atrial fibrillation     Calculus of ureter 12/22/2023    Cholangiocarcinoma 03/16/2022    Cirrhosis 11/23/2020    Diabetes mellitus, type 2     Diabetic neuropathy 11/24/2020    Disorder of kidney and ureter     HTN (hypertension) 11/23/2020    Hyperlipidemia 11/23/2020    Kidney stones 11/23/2020    S/p lithotripsy 2020    Leukocytosis 01/15/2021    Liver mass 11/23/2020    CASTILLO (nonalcoholic steatohepatitis) 11/23/2020    Nausea and vomiting 08/21/2023    Other ascites 03/16/2022    PAD (peripheral artery disease)     Portal hypertension 11/23/2020    Skin cancer 11/23/2020       Past Surgical History:   Procedure Laterality Date    ANGIOGRAM, CORONARY, WITH LEFT HEART CATHETERIZATION N/A 12/26/2023    Procedure: Angiogram, Coronary, with Left Heart Cath;  Surgeon: Carlos King MD;  Location: [facility] CATH LAB;  Service: Cardiology;  Laterality: N/A;    COLONOSCOPY  10/2020    ESOPHAGEAL MANOMETRY WITH MEASUREMENT OF IMPEDANCE N/A 7/17/2023    Procedure: MANOMETRY, ESOPHAGUS, WITH IMPEDANCE MEASUREMENT;  Surgeon: Klarissa Zamora MD;  [facility]  Service: Gastroenterology;  Laterality: N/A;  pt diabetic  liver txp  prep insructions sent to pt via email and portal -    ESOPHAGOGASTRODUODENOSCOPY  10/2020    ESOPHAGOGASTRODUODENOSCOPY N/A 7/1/2021    Procedure: EGD (ESOPHAGOGASTRODUODENOSCOPY);  Surgeon: Jovan David MD;  Location: [facility] (4TH FLR);  Service: Endoscopy;  Laterality: N/A;  HCC. Listed for liver  transplant. EGD for variceal surveillance.  cardiac clearance and blood thinenr approval received, see telephone encounter 6/23/21-BB  fully vaccinated-BB  labs same day of procedure-BB    EXCISION OF HYDROCELE Right     hemorroid surgery      hemorroidectomy      KIDNEY STONE SURGERY      LAPAROSCOPIC APPENDECTOMY N/A 6/2/2023    Procedure: APPENDECTOMY, LAPAROSCOPIC;  Surgeon: Shan Ross MD;  [facility];  Service: General;  Laterality: N/A;    LEFT HEART CATHETERIZATION N/A 1/27/2021    Procedure: Left heart cath;  Surgeon: Kris Shelton MD;  Location: [facility] CATH LAB;  Service: Cardiology;  Laterality: N/A;    liver mass removal      LIVER TRANSPLANT N/A 1/6/2022    Procedure: TRANSPLANT, LIVER;  Surgeon: Lizet Garcia MD;  [facility];  Service: Transplant;  Laterality: N/A;    RIGHT HEART CATHETERIZATION Right 5/7/2021    Procedure: INSERTION, CATHETER, RIGHT HEART;  Surgeon: Jaden Schuster MD;  Location: [facility] CATH LAB;  Service: Cardiology;  Laterality: Right;    STENT, DRUG ELUTING, SINGLE VESSEL, CORONARY  12/26/2023    Procedure: Stent, Drug Eluting, Single Vessel, Coronary;  Surgeon: Carlos King MD;  Location: [facility] CATH LAB;  Service: Cardiology;;    TREATMENT OF CARDIAC ARRHYTHMIA N/A 5/19/2021    Procedure: CARDIOVERSION;  Surgeon: Didier Tilley MD;  Location: [facility] EP LAB;  Service: Cardiology;  Laterality: N/A;  a fib, dccv/johny, mac, dm. sscu    TREATMENT OF CARDIAC ARRHYTHMIA N/A 5/31/2021    Procedure: CARDIOVERSION;  Surgeon: Daniel Arambula MD;  Location: [facility] EP LAB;  Service: Cardiology;  Laterality: N/A;  afib, DCCV, anest., DM, 3 prep    TREATMENT OF CARDIAC ARRHYTHMIA N/A 7/7/2021    Procedure: Cardioversion or Defibrillation;  Surgeon: Didier Tilley MD;  Location: [facility] EP LAB;  Service: Cardiology;  Laterality: N/A;  AF, JOHNY (cancel if complaint), DCCV, MAC, DM, 3 Prep    TREATMENT OF CARDIAC ARRHYTHMIA N/A 7/27/2021    Procedure:  Cardioversion or Defibrillation;  Surgeon: Didier Tilley MD;  Location: [facility] EP LAB;  Service: Cardiology;  Laterality: N/A;  AF, ASHISH (cx if complaint), DCCV, MAC, DM, 3 Prep       Review of patient's allergies indicates:   Allergen Reactions    Bee pollens Swelling     BEE STINGS swells body       Current Facility-Administered Medications on File Prior to Encounter   Medication    sodium chloride 0.9% bolus 1,000 mL     Current Outpatient Medications on File Prior to Encounter   Medication Sig    atorvastatin (LIPITOR) 80 MG tablet Take 1 tablet (80 mg total) by mouth once daily.    buPROPion (WELLBUTRIN XL) 150 MG TB24 tablet Take 1 tablet (150 mg total) by mouth every morning.    clopidogreL (PLAVIX) 75 mg tablet Take 1 tablet (75 mg total) by mouth once daily.    gabapentin (NEURONTIN) 300 MG capsule Take 1 capsule (300 mg total) by mouth 3 (three) times daily as needed (neuropathy).    apixaban (ELIQUIS) 5 mg Tab Take 1 tablet (5 mg total) by mouth 2 (two) times daily.    aspirin (ECOTRIN) 81 MG EC tablet Take 81 mg by mouth once daily.    blood sugar diagnostic Strp To check BG 2 times daily, to use with insurance preferred meter (patient has a [employer] self-monitoring glucose meter)    blood-glucose meter kit To check BG 2 times daily, to use with insurance preferred meter    calcium carbonate-vitamin D3 600 mg-20 mcg (800 unit) Tab Take 1 tablet by mouth once daily.    insulin (LANTUS SOLOSTAR U-100 INSULIN) glargine 100 units/mL SubQ pen INJECT 22 UNITS INTO THE SKIN EVERY EVENING. ADJUST DOSE UNTIL AM GLUCOSE GOAL . MAX DOSE 30 UNITS/DY    insulin lispro (HUMALOG KWIKPEN INSULIN) 100 unit/mL pen Inject 6 Units into the skin 3 (three) times daily with meals. Plus sliding scale, MDD: 48 units    lancets Misc To check BG 2 times daily, to use with insurance preferred meter    magnesium oxide (MAG-OX) 400 mg (241.3 mg magnesium) tablet Take 2 tablets (800 mg total) by mouth 3 (three) times daily.     "metoprolol succinate (TOPROL-XL) 25 MG 24 hr tablet Take 1 tablet (25 mg total) by mouth once daily.    midodrine (PROAMATINE) 10 MG tablet Take 1 tablet (10 mg total) by mouth 3 (three) times daily with meals.    multivitamin capsule Take 1 capsule by mouth once daily.    omega-3 fatty acids/fish oil (FISH OIL-OMEGA-3 FATTY ACIDS) 300-1,000 mg capsule Take 1 capsule by mouth once daily.     ondansetron (ZOFRAN-ODT) 4 MG TbDL Dissolve 1 tablet (4 mg total) by mouth every 8 (eight) hours as needed (nausea).    pantoprazole (PROTONIX) 40 MG tablet Take 1 tablet (40 mg total) by mouth once daily.    pen needle, diabetic (BD ULTRA-FINE GÉNESIS PEN NEEDLE) 32 gauge x 5/32" Ndle Use to inject insulin 4 times daily    QUEtiapine (SEROQUEL) 100 MG Tab Take 1 tablet (100 mg total) by mouth every evening.    sertraline (ZOLOFT) 25 MG tablet Take 1 tablet (25 mg total) by mouth once daily.    tacrolimus (PROGRAF) 0.5 MG Cap Take 1 capsule (0.5 mg total) by mouth every 12 (twelve) hours.     Family History       Problem Relation (Age of Onset)    Coronary artery disease Father          Tobacco Use    Smoking status: Former     Current packs/day: 0.00     Types: Cigarettes     Quit date: 1980     Years since quittin.0    Smokeless tobacco: Former   Substance and Sexual Activity    Alcohol use: Not Currently    Drug use: Never    Sexual activity: Not on file     Review of Systems   Constitutional:  Positive for fatigue. Negative for chills, diaphoresis and fever.   Eyes:  Negative for visual disturbance.   Respiratory:  Positive for chest tightness and shortness of breath. Negative for cough.    Cardiovascular:  Positive for palpitations. Negative for chest pain and leg swelling.   Gastrointestinal:  Positive for abdominal pain and diarrhea. Negative for constipation, nausea and vomiting.   Genitourinary:  Negative for dysuria, frequency and urgency.   Neurological:  Negative for dizziness, light-headedness and headaches. "     Objective:     Vital Signs (Most Recent):  Temp: 97.6 °F (36.4 °C) (24 1715)  Pulse: 98 (24)  Resp: (!) 22 (24)  BP: 132/73 (24)  SpO2: 100 % (24) Vital Signs (24h Range):  Temp:  [97.6 °F (36.4 °C)] 97.6 °F (36.4 °C)  Pulse:  [] 98  Resp:  [18-22] 22  SpO2:  [86 %-100 %] 100 %  BP: (132-162)/() 132/73     Weight: 93.9 kg (207 lb)  Body mass index is 28.07 kg/m².     Physical Exam  Constitutional:       General: He is not in acute distress.     Appearance: Normal appearance. He is not ill-appearing.   HENT:      Head: Normocephalic and atraumatic.      Nose: Nose normal.      Mouth/Throat:      Mouth: Mucous membranes are moist.   Eyes:      Extraocular Movements: Extraocular movements intact.      Conjunctiva/sclera: Conjunctivae normal.      Pupils: Pupils are equal, round, and reactive to light.   Neck:      Comments: No noted JVD  Cardiovascular:      Rate and Rhythm: Normal rate. Rhythm irregular.      Pulses: Normal pulses.      Heart sounds: Murmur (3/6 systolic murmur) heard.   Pulmonary:      Effort: Pulmonary effort is normal. No respiratory distress.      Breath sounds: Rales present. No wheezing or rhonchi.      Comments: Breathing comfortably on 4L NC  Abdominal:      General: Abdomen is flat. Bowel sounds are normal. There is no distension.      Palpations: Abdomen is soft.      Tenderness: There is abdominal tenderness (Minor diffuse abdominal pain.).   Musculoskeletal:         General: Normal range of motion.      Right lower le+ Edema present.      Left lower le+ Edema present.   Skin:     General: Skin is warm and dry.      Capillary Refill: Capillary refill takes less than 2 seconds.   Neurological:      Mental Status: He is alert and oriented to person, place, and time.   Psychiatric:         Mood and Affect: Mood normal.         Behavior: Behavior normal.         Thought Content: Thought content normal.         Judgment:  "Judgment normal.              CRANIAL NERVES     CN III, IV, VI   Pupils are equal, round, and reactive to light.       Significant Labs: All pertinent labs within the past 24 hours have been reviewed.  CBC:   Recent Labs   Lab 01/25/24 2004   WBC 12.42   HGB 9.7*   HCT 36.0*        CMP:   Recent Labs   Lab 01/25/24 2004      K 5.0      CO2 21*   *   BUN 27*   CREATININE 1.1   CALCIUM 8.9   PROT 7.4   ALBUMIN 3.6   BILITOT 1.2*   ALKPHOS 146*   AST 35   ALT 27   ANIONGAP 12       Significant Imaging: I have reviewed all pertinent imaging results/findings within the past 24 hours.  Assessment/Plan:     * HFrEF (heart failure with reduced ejection fraction)  Patient admitted with volume overload. CXR: "Bibasilar airspace opacities with perihilar interstitial prominence and pulmonary vascular congestion.  Findings are compatible with pulmonary edema/CHF".    Baseline BNP:   Lab Results   Component Value Date     (H) 01/25/2024     (H) 12/29/2023     (H) 12/22/2023    BNP 91 04/11/2022    BNP 45 02/01/2022      Results for orders placed during the hospital encounter of 12/22/23    Echo    Interpretation Summary    Left Ventricle: The left ventricle is normal in size. Mildly increased wall thickness. There is concentric remodeling. There is severely reduced systolic function with a visually estimated ejection fraction of 20 - 25%. All walls except for the basal septum are hypokinetic. When directly compared to images from 7/5/2022 there has been a significant change in left ventricular systolic function.    LV Diastolic function: Unable to assess diastolic function due to E-A fusion. Average E/e' ratio is 11.    Right Ventricle: Normal right ventricular cavity size. Wall thickness is normal. Right ventricle wall motion  is normal. Systolic function is normal.    Left Atrium: Left atrium is severely dilated.    Aortic Valve: There is moderate to severe low gradient aortic " stenosis. Aortic valve area by VTI is 0.97 cm². LVOT diameter is 2.4 cm. Aortic valve peak velocity is 3.24 m/s. Mean gradient is 28 mmHg. The dimensionless index is 0.21. There is no significant regurgitation.    Mitral Valve: There is mild to moderate regurgitation.    Tricuspid Valve: There is mild to moderate regurgitation.    Pulmonary Artery: The estimated pulmonary artery systolic pressure is 38 mm Hg.    IVC/SVC: Low central venous pressure.    Pericardium: There is a very small circumferential effusion; slightly more fluid is located laterally.    - furosemide (LASIX) 40 mg two times daily  - Continue home metoprolol  - Patient likely unable to tolerate rest of GDMT as patient on midodrine  - Continue home midodrine  - Fluid restriction at 1.5L/day, strict I/Os and daily standing weights, maintain on telemetry and daily EKGs, maintain Mag >2 & K+ >4  - SCDs, TEDs, Nursing communication to elevated LE, ambulate as tolerated            Persistent atrial fibrillation  Patient currently in A fib. Was scheduled for cardioversion the day after admission (01/26). Recently discontinued on amiodarone and started on metoprolol. [id number] score: 3    -Continued on home eliquis  -Continued on home metoprolol    Abdominal pain  Diffuse abdominal pain and tenderness. Patent states is chronic but perhaps worsened lately. WBC>12. Lactate wnl. High risk for SBP. Some noted ascites on exam but abdomen not distended.     -F/u repeat lactate  -Low threshold to start antibiotics for SBP      Cholangiocarcinoma  s/p orthotopic liver transplant Jan 2022       S/P liver transplant  -Continue home tacrolimus  -CMP and tacrolimus level daily      Coronary artery disease involving native coronary artery of native heart without angina pectoris  -Continue home plavix and statin   -Monitor for S/Sx of angina/ACS. Continue to monitor on telemetry    Type 2 diabetes mellitus, with long-term current use of insulin  Lab Results   Component  "Value Date    HGBA1C 6.5 (H) 12/23/2023     No results for input(s): "POCTGLUCOSE" in the last 72 hours.  Antihyperglycemics (From admission, onward)      Start     Stop Route Frequency Ordered    01/26/24 0233  insulin aspart U-100 pen 0-5 Units         -- SubQ Before meals & nightly PRN 01/26/24 0133          - continue SSI  - diabetic diet  - POCT glucose checks TIDWM and qhs  - goal glucose 140-180        Primary hypertension  Home meds for hypertension were reviewed and noted below.   Hypertension Medications               metoprolol succinate (TOPROL-XL) 25 MG 24 hr tablet Take 1 tablet (25 mg total) by mouth once daily.          -Continuing home antihypertensives    Hyperlipidemia  -Continue home statin        VTE Risk Mitigation (From admission, onward)           Ordered     apixaban tablet 5 mg  2 times daily         01/25/24 2208     IP VTE HIGH RISK PATIENT  Once         01/25/24 2211     Place sequential compression device  Until discontinued         01/25/24 2211                                    Shan Briscoe DO  Department of Hospital Medicine  [facility]          "

## 2024-01-26 NOTE — TRANSFER OF CARE
Anesthesia Transfer of Care Note    Patient: Tej Purdy    Procedure(s) Performed: Procedure(s) (LRB):  Cardioversion or Defibrillation (N/A)  Transesophageal echo (ASHISH) intra-procedure log documentation (N/A)    Patient location: Cath Lab    Anesthesia Type: general    Transport from OR: Transported from OR on 6-10 L/min O2 by face mask with adequate spontaneous ventilation    Post pain: adequate analgesia    Post assessment: no apparent anesthetic complications and tolerated procedure well    Post vital signs: stable    Level of consciousness: lethargic and responds to stimulation    Nausea/Vomiting: no nausea/vomiting    Complications: none    Transfer of care protocol was followed      Last vitals: Visit Vitals  /83 (BP Location: Right arm, Patient Position: Lying)   Pulse 95   Temp 37 °C (98.6 °F) (Oral)   Resp 19   Wt 93.9 kg (207 lb)   SpO2 97%   BMI 28.07 kg/m²

## 2024-01-26 NOTE — ASSESSMENT & PLAN NOTE
-Continue home plavix and statin   -Monitor for S/Sx of angina/ACS. Continue to monitor on telemetry

## 2024-01-26 NOTE — ED TRIAGE NOTES
Patient is a 68-year-old male presents to the ED via personal vehicle with c/o SOB. Patient 86% on RA in triage. Patient states he has been short of breath for a couple of days but today got really bad. Patient states he was hyperventilating and using accessory muscle to breath. Denies chest pain.

## 2024-01-26 NOTE — ASSESSMENT & PLAN NOTE
Patient currently in A fib. Was scheduled for cardioversion the day after admission (01/26). Recently discontinued on amiodarone and started on metoprolol. [id number] score: 3    -Continued on home eliquis  -Continued on home metoprolol

## 2024-01-26 NOTE — ED NOTES
I assumed care of this patient at this time. Report received from NICHELLE Stanley. Pt is resting comfortably in ED stretcher. Pt is AAOx4. RR is even, unlabored, and spontaneous + 98% oxygen saturation on room air at this time. Skin is warm, dry and intact. Pt remains on continuous cardiac monitor, pulse ox, and BP cuff to cycle. Pt denies pain or needs at this time. Bed low and locked; side rails up x2; call light within reach.

## 2024-01-26 NOTE — PLAN OF CARE
Patient received to cath [facility] bay 10 s/p ASHISH/DCCV. Patient asleep with oral airway in place. Report received from CRAIG Moyer and NICHELLE Pollard. Oral airway removed by CRNA. Patient arousable. Bedside monitoring connected. Vss. Post procedure bedside monitoring in progress.

## 2024-01-27 ENCOUNTER — HOSPITAL ENCOUNTER (INPATIENT)
Facility: HOSPITAL | Age: 69
LOS: 6 days | Discharge: HOME | DRG: 319 | End: 2024-02-02
Attending: EMERGENCY MEDICINE | Admitting: FAMILY MEDICINE
Payer: MEDICARE

## 2024-01-27 ENCOUNTER — NURSE TRIAGE (OUTPATIENT)
Dept: ADMINISTRATIVE | Facility: CLINIC | Age: 69
End: 2024-01-27
Payer: MEDICARE

## 2024-01-27 DIAGNOSIS — E11.65 TYPE 2 DIABETES MELLITUS WITH HYPERGLYCEMIA, WITH LONG-TERM CURRENT USE OF INSULIN: ICD-10-CM

## 2024-01-27 DIAGNOSIS — R07.9 CHEST PAIN: ICD-10-CM

## 2024-01-27 DIAGNOSIS — Z79.4 TYPE 2 DIABETES MELLITUS WITH HYPERGLYCEMIA, WITH LONG-TERM CURRENT USE OF INSULIN: ICD-10-CM

## 2024-01-27 DIAGNOSIS — I50.9 ACUTE ON CHRONIC HEART FAILURE: ICD-10-CM

## 2024-01-27 DIAGNOSIS — I50.23 ACUTE ON CHRONIC HFREF (HEART FAILURE WITH REDUCED EJECTION FRACTION): ICD-10-CM

## 2024-01-27 DIAGNOSIS — J96.01 ACUTE HYPOXIC RESPIRATORY FAILURE: ICD-10-CM

## 2024-01-27 DIAGNOSIS — I35.0 AORTIC VALVE STENOSIS, ETIOLOGY OF CARDIAC VALVE DISEASE UNSPECIFIED: Primary | ICD-10-CM

## 2024-01-27 DIAGNOSIS — I35.0 SEVERE AORTIC STENOSIS: ICD-10-CM

## 2024-01-27 DIAGNOSIS — R06.02 SOB (SHORTNESS OF BREATH): ICD-10-CM

## 2024-01-27 DIAGNOSIS — I50.9 CHF (CONGESTIVE HEART FAILURE): ICD-10-CM

## 2024-01-27 DIAGNOSIS — I35.0 NONRHEUMATIC AORTIC VALVE STENOSIS: ICD-10-CM

## 2024-01-27 PROBLEM — I48.19 PERSISTENT ATRIAL FIBRILLATION: Chronic | Status: RESOLVED | Noted: 2021-05-14 | Resolved: 2024-01-27

## 2024-01-27 PROBLEM — Z94.4 HISTORY OF LIVER TRANSPLANT: Status: ACTIVE | Noted: 2024-01-27

## 2024-01-27 PROBLEM — I50.43 ACUTE ON CHRONIC HEART FAILURE WITH REDUCED EJECTION FRACTION AND DIASTOLIC DYSFUNCTION: Status: ACTIVE | Noted: 2024-01-27

## 2024-01-27 LAB
ALBUMIN SERPL BCP-MCNC: 3.5 G/DL (ref 3.5–5.2)
ALP SERPL-CCNC: 127 U/L (ref 55–135)
ALT SERPL W/O P-5'-P-CCNC: 19 U/L (ref 10–44)
ANION GAP SERPL CALC-SCNC: 8 MMOL/L (ref 8–16)
AST SERPL-CCNC: 22 U/L (ref 10–40)
BASOPHILS # BLD AUTO: 0.04 K/UL (ref 0–0.2)
BASOPHILS NFR BLD: 0.4 % (ref 0–1.9)
BILIRUB SERPL-MCNC: 1.5 MG/DL (ref 0.1–1)
BILIRUB UR QL STRIP: NEGATIVE
BNP SERPL-MCNC: 962 PG/ML (ref 0–99)
BUN SERPL-MCNC: 37 MG/DL (ref 8–23)
CALCIUM SERPL-MCNC: 9.3 MG/DL (ref 8.7–10.5)
CHLORIDE SERPL-SCNC: 100 MMOL/L (ref 95–110)
CLARITY UR REFRACT.AUTO: CLEAR
CO2 SERPL-SCNC: 29 MMOL/L (ref 23–29)
COLOR UR AUTO: YELLOW
CREAT SERPL-MCNC: 1.5 MG/DL (ref 0.5–1.4)
DIFFERENTIAL METHOD BLD: ABNORMAL
EOSINOPHIL # BLD AUTO: 0.1 K/UL (ref 0–0.5)
EOSINOPHIL NFR BLD: 0.9 % (ref 0–8)
ERYTHROCYTE [DISTWIDTH] IN BLOOD BY AUTOMATED COUNT: 19.9 % (ref 11.5–14.5)
EST. GFR  (NO RACE VARIABLE): 49.8 ML/MIN/1.73 M^2
GLUCOSE SERPL-MCNC: 233 MG/DL (ref 70–110)
GLUCOSE UR QL STRIP: ABNORMAL
HCT VFR BLD AUTO: 32.7 % (ref 40–54)
HGB BLD-MCNC: 9.1 G/DL (ref 14–18)
HGB UR QL STRIP: NEGATIVE
IMM GRANULOCYTES # BLD AUTO: 0.05 K/UL (ref 0–0.04)
IMM GRANULOCYTES NFR BLD AUTO: 0.5 % (ref 0–0.5)
INFLUENZA A, MOLECULAR: NEGATIVE
INFLUENZA B, MOLECULAR: NEGATIVE
INR PPP: 1.4 (ref 0.8–1.2)
KETONES UR QL STRIP: NEGATIVE
LEUKOCYTE ESTERASE UR QL STRIP: NEGATIVE
LYMPHOCYTES # BLD AUTO: 0.9 K/UL (ref 1–4.8)
LYMPHOCYTES NFR BLD: 8.4 % (ref 18–48)
MCH RBC QN AUTO: 19.3 PG (ref 27–31)
MCHC RBC AUTO-ENTMCNC: 27.8 G/DL (ref 32–36)
MCV RBC AUTO: 69 FL (ref 82–98)
MONOCYTES # BLD AUTO: 1.2 K/UL (ref 0.3–1)
MONOCYTES NFR BLD: 12 % (ref 4–15)
NEUTROPHILS # BLD AUTO: 8 K/UL (ref 1.8–7.7)
NEUTROPHILS NFR BLD: 77.8 % (ref 38–73)
NITRITE UR QL STRIP: NEGATIVE
NRBC BLD-RTO: 0 /100 WBC
PH UR STRIP: 6 [PH] (ref 5–8)
PLATELET # BLD AUTO: 180 K/UL (ref 150–450)
PMV BLD AUTO: 10.3 FL (ref 9.2–12.9)
POCT GLUCOSE: 184 MG/DL (ref 70–110)
POTASSIUM SERPL-SCNC: 4.4 MMOL/L (ref 3.5–5.1)
PROT SERPL-MCNC: 7.1 G/DL (ref 6–8.4)
PROT UR QL STRIP: ABNORMAL
PROTHROMBIN TIME: 14.8 SEC (ref 9–12.5)
RBC # BLD AUTO: 4.72 M/UL (ref 4.6–6.2)
SARS-COV-2 RDRP RESP QL NAA+PROBE: NEGATIVE
SODIUM SERPL-SCNC: 137 MMOL/L (ref 136–145)
SP GR UR STRIP: 1.02 (ref 1–1.03)
SPECIMEN SOURCE: NORMAL
TROPONIN I SERPL DL<=0.01 NG/ML-MCNC: 0.02 NG/ML (ref 0–0.03)
URN SPEC COLLECT METH UR: ABNORMAL
WBC # BLD AUTO: 10.28 K/UL (ref 3.9–12.7)

## 2024-01-27 PROCEDURE — 85610 PROTHROMBIN TIME: CPT | Performed by: NURSE PRACTITIONER

## 2024-01-27 PROCEDURE — 99285 EMERGENCY DEPT VISIT HI MDM: CPT | Mod: 25

## 2024-01-27 PROCEDURE — 25000003 PHARM REV CODE 250

## 2024-01-27 PROCEDURE — 93010 ELECTROCARDIOGRAM REPORT: CPT | Mod: ,,, | Performed by: INTERNAL MEDICINE

## 2024-01-27 PROCEDURE — 83880 ASSAY OF NATRIURETIC PEPTIDE: CPT | Performed by: NURSE PRACTITIONER

## 2024-01-27 PROCEDURE — 84484 ASSAY OF TROPONIN QUANT: CPT | Performed by: NURSE PRACTITIONER

## 2024-01-27 PROCEDURE — 85025 COMPLETE CBC W/AUTO DIFF WBC: CPT | Performed by: NURSE PRACTITIONER

## 2024-01-27 PROCEDURE — 80053 COMPREHEN METABOLIC PANEL: CPT | Performed by: NURSE PRACTITIONER

## 2024-01-27 PROCEDURE — 027F3ZZ DILATION OF AORTIC VALVE, PERCUTANEOUS APPROACH: ICD-10-PCS | Performed by: INTERNAL MEDICINE

## 2024-01-27 PROCEDURE — 81003 URINALYSIS AUTO W/O SCOPE: CPT | Performed by: STUDENT IN AN ORGANIZED HEALTH CARE EDUCATION/TRAINING PROGRAM

## 2024-01-27 PROCEDURE — 93005 ELECTROCARDIOGRAM TRACING: CPT

## 2024-01-27 PROCEDURE — 87502 INFLUENZA DNA AMP PROBE: CPT | Performed by: STUDENT IN AN ORGANIZED HEALTH CARE EDUCATION/TRAINING PROGRAM

## 2024-01-27 PROCEDURE — 20600001 HC STEP DOWN PRIVATE ROOM

## 2024-01-27 PROCEDURE — 63600175 PHARM REV CODE 636 W HCPCS

## 2024-01-27 PROCEDURE — U0002 COVID-19 LAB TEST NON-CDC: HCPCS | Performed by: STUDENT IN AN ORGANIZED HEALTH CARE EDUCATION/TRAINING PROGRAM

## 2024-01-27 RX ORDER — METOPROLOL SUCCINATE 25 MG/1
25 TABLET, EXTENDED RELEASE ORAL DAILY
Status: DISCONTINUED | OUTPATIENT
Start: 2024-01-28 | End: 2024-02-02 | Stop reason: HOSPADM

## 2024-01-27 RX ORDER — IBUPROFEN 200 MG
24 TABLET ORAL
Status: DISCONTINUED | OUTPATIENT
Start: 2024-01-27 | End: 2024-02-02 | Stop reason: HOSPADM

## 2024-01-27 RX ORDER — ATORVASTATIN CALCIUM 40 MG/1
80 TABLET, FILM COATED ORAL DAILY
Status: DISCONTINUED | OUTPATIENT
Start: 2024-01-28 | End: 2024-02-02 | Stop reason: HOSPADM

## 2024-01-27 RX ORDER — CLOPIDOGREL BISULFATE 75 MG/1
75 TABLET ORAL DAILY
Status: DISCONTINUED | OUTPATIENT
Start: 2024-01-28 | End: 2024-02-02 | Stop reason: HOSPADM

## 2024-01-27 RX ORDER — MIDODRINE HYDROCHLORIDE 5 MG/1
5 TABLET ORAL
Status: DISCONTINUED | OUTPATIENT
Start: 2024-01-28 | End: 2024-01-28

## 2024-01-27 RX ORDER — INSULIN ASPART 100 [IU]/ML
0-5 INJECTION, SOLUTION INTRAVENOUS; SUBCUTANEOUS
Status: DISCONTINUED | OUTPATIENT
Start: 2024-01-27 | End: 2024-02-02 | Stop reason: HOSPADM

## 2024-01-27 RX ORDER — IBUPROFEN 200 MG
16 TABLET ORAL
Status: DISCONTINUED | OUTPATIENT
Start: 2024-01-27 | End: 2024-02-02 | Stop reason: HOSPADM

## 2024-01-27 RX ORDER — BUPROPION HYDROCHLORIDE 150 MG/1
150 TABLET ORAL EVERY MORNING
Status: DISCONTINUED | OUTPATIENT
Start: 2024-01-28 | End: 2024-02-02 | Stop reason: HOSPADM

## 2024-01-27 RX ORDER — NALOXONE HCL 0.4 MG/ML
0.02 VIAL (ML) INJECTION
Status: DISCONTINUED | OUTPATIENT
Start: 2024-01-27 | End: 2024-02-02 | Stop reason: HOSPADM

## 2024-01-27 RX ORDER — GLUCAGON 1 MG
1 KIT INJECTION
Status: DISCONTINUED | OUTPATIENT
Start: 2024-01-27 | End: 2024-02-02 | Stop reason: HOSPADM

## 2024-01-27 RX ORDER — ALUMINUM HYDROXIDE, MAGNESIUM HYDROXIDE, AND SIMETHICONE 1200; 120; 1200 MG/30ML; MG/30ML; MG/30ML
30 SUSPENSION ORAL
Status: DISCONTINUED | OUTPATIENT
Start: 2024-01-27 | End: 2024-01-27

## 2024-01-27 RX ORDER — SODIUM CHLORIDE 0.9 % (FLUSH) 0.9 %
10 SYRINGE (ML) INJECTION EVERY 12 HOURS PRN
Status: DISCONTINUED | OUTPATIENT
Start: 2024-01-27 | End: 2024-02-02 | Stop reason: HOSPADM

## 2024-01-27 RX ORDER — TACROLIMUS 0.5 MG/1
0.5 CAPSULE ORAL 2 TIMES DAILY
Status: DISCONTINUED | OUTPATIENT
Start: 2024-01-27 | End: 2024-02-02 | Stop reason: HOSPADM

## 2024-01-27 RX ORDER — FUROSEMIDE 10 MG/ML
40 INJECTION INTRAMUSCULAR; INTRAVENOUS 2 TIMES DAILY
Status: DISCONTINUED | OUTPATIENT
Start: 2024-01-28 | End: 2024-01-27

## 2024-01-27 RX ORDER — PANTOPRAZOLE SODIUM 40 MG/1
40 TABLET, DELAYED RELEASE ORAL DAILY
Status: DISCONTINUED | OUTPATIENT
Start: 2024-01-28 | End: 2024-02-02 | Stop reason: HOSPADM

## 2024-01-27 RX ORDER — PANTOPRAZOLE SODIUM 40 MG/1
40 TABLET, DELAYED RELEASE ORAL DAILY
Status: DISCONTINUED | OUTPATIENT
Start: 2024-01-28 | End: 2024-01-27

## 2024-01-27 RX ORDER — FUROSEMIDE 10 MG/ML
40 INJECTION INTRAMUSCULAR; INTRAVENOUS EVERY 12 HOURS
Status: DISCONTINUED | OUTPATIENT
Start: 2024-01-27 | End: 2024-01-29

## 2024-01-27 RX ORDER — ACETAMINOPHEN 325 MG/1
650 TABLET ORAL EVERY 4 HOURS PRN
Status: DISCONTINUED | OUTPATIENT
Start: 2024-01-27 | End: 2024-01-27

## 2024-01-27 RX ADMIN — FUROSEMIDE 40 MG: 10 INJECTION, SOLUTION INTRAVENOUS at 08:01

## 2024-01-27 RX ADMIN — TACROLIMUS 0.5 MG: 0.5 CAPSULE ORAL at 09:01

## 2024-01-27 RX ADMIN — APIXABAN 5 MG: 5 TABLET, FILM COATED ORAL at 08:01

## 2024-01-27 RX ADMIN — INSULIN DETEMIR 10 UNITS: 100 INJECTION, SOLUTION SUBCUTANEOUS at 08:01

## 2024-01-27 NOTE — TELEPHONE ENCOUNTER
[place]    PCP:  Dr. Thais Maxwell    H/O Liver Transplant on 1/6/22.  She reports he had a heart attack last week and was in A. Fib S/P Cardioversion.  S/P hospital discharge yesterday.  C/O moderate SOB, dizziness/lightheadedness with exertion, fatigue, wheezing upon exhalation, and worsening chest tightness.  SpO2:  99%.  Denies fever, runny nose, cough, vomiting, and palpitations.  Per protocol, care advised is go to ED now.  Pt/SO VU.  BPA 16 used.  Advised to call for worsening/questions/concerns.  VU.    Reason for Disposition   [1] MODERATE difficulty breathing (e.g., speaks in phrases, SOB even at rest, pulse 100-120) AND [2] NEW-onset or WORSE than normal    Additional Information   Negative: SEVERE difficulty breathing (e.g., struggling for each breath, speaks in single words)   Negative: [1] Breathing stopped AND [2] hasn't returned   Negative: Choking on something   Negative: Bluish (or gray) lips or face now   Negative: Difficult to awaken or acting confused (e.g., disoriented, slurred speech)   Negative: Passed out (i.e., lost consciousness, collapsed and was not responding)   Negative: Wheezing started suddenly after medicine, an allergic food or bee sting   Negative: Stridor (harsh sound while breathing in)   Negative: Slow, shallow and weak breathing   Negative: Sounds like a life-threatening emergency to the triager    Protocols used: Breathing Difficulty-A-AH

## 2024-01-27 NOTE — ED PROVIDER NOTES
Encounter Date: 1/27/2024       History     Chief Complaint   Patient presents with    Chest Pain     Chest pain with SOB, just discharged yesterday. +cardiac history     HPI    68 year old male with history of T2DM, CASTILLO, portal HTN, PAD, afib, HLD, cholangiocarcinoma, liver transplant HFrEF, HTN presenting for chest pain and Shortness of breath.     Patient presents the day following discharge for shortness of breath.  He notes waking up with new onset orthopnea that was not there the previous night.  He also has dyspnea on exertion but no dyspnea at rest and also notes he has lightheadedness with his orthopnea and dyspnea on exertion.  He has mild cough from yesterday, he reports this started after his ASHISH, is nonproductive.  He denies fever, chills, nausea, vomiting.  He notes soft stool yesterday but denies diarrhea, denies polyuria or hematuria or back pain.      He does have associated chest tightness.  He denies that this is a pain.  It feels like substernal squeezing.  He denies missed doses of medications.        On chart review, patient was admitted for AHRF due to CHF exacerbation with recurrent atrial fibrillation, needed O2 via NC, diuresed with lasix, was cardioverted with ASHISH, and midodrine decreased and he was discharged with discussion of outpatient aortic valve replacement.     Review of patient's allergies indicates:   Allergen Reactions    Bee pollens Swelling     BEE STINGS swells body     Past Medical History:   Diagnosis Date    Acute appendicitis 06/02/2023    Atrial fibrillation     Calculus of ureter 12/22/2023    Cholangiocarcinoma 03/16/2022    Cirrhosis 11/23/2020    Diabetes mellitus, type 2     Diabetic neuropathy 11/24/2020    Disorder of kidney and ureter     HTN (hypertension) 11/23/2020    Hyperlipidemia 11/23/2020    Kidney stones 11/23/2020    S/p lithotripsy 2020    Leukocytosis 01/15/2021    Liver mass 11/23/2020    CASTILLO (nonalcoholic steatohepatitis) 11/23/2020    Nausea  and vomiting 08/21/2023    Other ascites 03/16/2022    PAD (peripheral artery disease)     Portal hypertension 11/23/2020    Skin cancer 11/23/2020     Past Surgical History:   Procedure Laterality Date    ANGIOGRAM, CORONARY, WITH LEFT HEART CATHETERIZATION N/A 12/26/2023    Procedure: Angiogram, Coronary, with Left Heart Cath;  Surgeon: Carlos King MD;  Location: [facility] CATH LAB;  Service: Cardiology;  Laterality: N/A;    COLONOSCOPY  10/2020    ESOPHAGEAL MANOMETRY WITH MEASUREMENT OF IMPEDANCE N/A 7/17/2023    Procedure: MANOMETRY, ESOPHAGUS, WITH IMPEDANCE MEASUREMENT;  Surgeon: Klarissa Zamora MD;  Location: [facility] ENDO (4TH FLR);  Service: Gastroenterology;  Laterality: N/A;  pt diabetic  liver txp  prep insructions sent to pt via email and portal -Jm    ESOPHAGOGASTRODUODENOSCOPY  10/2020    ESOPHAGOGASTRODUODENOSCOPY N/A 7/1/2021    Procedure: EGD (ESOPHAGOGASTRODUODENOSCOPY);  Surgeon: Jovan David MD;  Location: [facility] ENDO (4TH FLR);  Service: Endoscopy;  Laterality: N/A;  HCC. Listed for liver transplant. EGD for variceal surveillance.  cardiac clearance and blood thinenr approval received, see telephone encounter 6/23/21-BB  fully vaccinated-BB  labs same day of procedure-BB    EXCISION OF HYDROCELE Right     hemorroid surgery      hemorroidectomy      KIDNEY STONE SURGERY      LAPAROSCOPIC APPENDECTOMY N/A 6/2/2023    Procedure: APPENDECTOMY, LAPAROSCOPIC;  Surgeon: Shan Ross MD;  Location: [facility] OR 2ND FLR;  Service: General;  Laterality: N/A;    LEFT HEART CATHETERIZATION N/A 1/27/2021    Procedure: Left heart cath;  Surgeon: Kris Shelton MD;  Location: [facility] CATH LAB;  Service: Cardiology;  Laterality: N/A;    liver mass removal      LIVER TRANSPLANT N/A 1/6/2022    Procedure: TRANSPLANT, LIVER;  Surgeon: Lizet Garcia MD;  Location: [facility] OR 2ND FLR;  Service: Transplant;  Laterality: N/A;    RIGHT HEART CATHETERIZATION Right 5/7/2021    Procedure: INSERTION, CATHETER, RIGHT  HEART;  Surgeon: Jaden Schuster MD;  Location: [facility] CATH LAB;  Service: Cardiology;  Laterality: Right;    STENT, DRUG ELUTING, SINGLE VESSEL, CORONARY  2023    Procedure: Stent, Drug Eluting, Single Vessel, Coronary;  Surgeon: Carlos King MD;  Location: [facility] CATH LAB;  Service: Cardiology;;    TREATMENT OF CARDIAC ARRHYTHMIA N/A 2021    Procedure: CARDIOVERSION;  Surgeon: Didier Tilley MD;  Location: [facility] EP LAB;  Service: Cardiology;  Laterality: N/A;  a fib, dccv/johny, mac, dm. sscu    TREATMENT OF CARDIAC ARRHYTHMIA N/A 2021    Procedure: CARDIOVERSION;  Surgeon: Daniel Arambula MD;  Location: [facility] EP LAB;  Service: Cardiology;  Laterality: N/A;  afib, DCCV, anest., DM, 3 prep    TREATMENT OF CARDIAC ARRHYTHMIA N/A 2021    Procedure: Cardioversion or Defibrillation;  Surgeon: Didier Tilley MD;  Location: [facility] EP LAB;  Service: Cardiology;  Laterality: N/A;  AF, JOHNY (cancel if complaint), DCCV, MAC, DM, 3 Prep    TREATMENT OF CARDIAC ARRHYTHMIA N/A 2021    Procedure: Cardioversion or Defibrillation;  Surgeon: Didier Tilley MD;  Location: [facility] EP LAB;  Service: Cardiology;  Laterality: N/A;  AF, JOHNY (cx if complaint), DCCV, MAC, DM, 3 Prep     Family History   Problem Relation Age of Onset    Coronary artery disease Father     Colon cancer Neg Hx     Esophageal cancer Neg Hx      Social History     Tobacco Use    Smoking status: Former     Current packs/day: 0.00     Types: Cigarettes     Quit date: 1980     Years since quittin.0    Smokeless tobacco: Former   Substance Use Topics    Alcohol use: Not Currently    Drug use: Never     Review of Systems  See HPI for pertinent ROS  Physical Exam     Initial Vitals [24 1420]   BP Pulse Resp Temp SpO2   (!) 147/78 76 18 98.4 °F (36.9 °C) (!) 87 %      MAP       --         Physical Exam    Constitutional: He appears well-developed and well-nourished.   HENT:   Head: Normocephalic and atraumatic.   Eyes: Pupils are  equal, round, and reactive to light.   Neck: Neck supple. No JVD present.   Normal range of motion.  Cardiovascular:  Normal rate and regular rhythm.           Murmur (creschendo systolic murmur) heard.  Pulmonary/Chest: Breath sounds normal. No stridor. He has no wheezes. He has no rhonchi.   Abdominal: There is no abdominal tenderness. There is no rebound.   Musculoskeletal:         General: No tenderness or edema. Normal range of motion.      Cervical back: Normal range of motion and neck supple.     Neurological: He is alert and oriented to person, place, and time.   Skin: Skin is warm. Capillary refill takes less than 2 seconds.   Psychiatric: He has a normal mood and affect.         ED Course   Procedures  Labs Reviewed   B-TYPE NATRIURETIC PEPTIDE - Abnormal; Notable for the following components:       Result Value     (*)     All other components within normal limits   CBC W/ AUTO DIFFERENTIAL - Abnormal; Notable for the following components:    Hemoglobin 9.1 (*)     Hematocrit 32.7 (*)     MCV 69 (*)     MCH 19.3 (*)     MCHC 27.8 (*)     RDW 19.9 (*)     Gran # (ANC) 8.0 (*)     Immature Grans (Abs) 0.05 (*)     Lymph # 0.9 (*)     Mono # 1.2 (*)     Gran % 77.8 (*)     Lymph % 8.4 (*)     All other components within normal limits   COMPREHENSIVE METABOLIC PANEL - Abnormal; Notable for the following components:    Glucose 233 (*)     BUN 37 (*)     Creatinine 1.5 (*)     Total Bilirubin 1.5 (*)     eGFR 49.8 (*)     All other components within normal limits   PROTIME-INR - Abnormal; Notable for the following components:    Prothrombin Time 14.8 (*)     INR 1.4 (*)     All other components within normal limits   URINALYSIS, REFLEX TO URINE CULTURE - Abnormal; Notable for the following components:    Protein, UA Trace (*)     Glucose, UA Trace (*)     All other components within normal limits    Narrative:     Specimen Source->Urine   POCT GLUCOSE - Abnormal; Notable for the following components:     POCT Glucose 184 (*)     All other components within normal limits   INFLUENZA A & B BY MOLECULAR   TROPONIN I   SARS-COV-2 RNA AMPLIFICATION, QUAL   POCT GLUCOSE MONITORING CONTINUOUS          Imaging Results              X-Ray Chest 1 View (Final result)  Result time 01/27/24 18:29:45      Final result by Delicia Cardona MD (01/27/24 18:29:45)                   Impression:      Cardiomegaly with increased pulmonary venous pressure, not significantly changed.      Electronically signed by: Delicia Cardona MD  Date:    01/27/2024  Time:    18:29               Narrative:    EXAMINATION:  XR CHEST 1 VIEW    CLINICAL HISTORY:  Chest pain, unspecified    TECHNIQUE:  Single frontal view of the chest was performed.    COMPARISON:  01/25/2024    FINDINGS:  The cardiac silhouette is enlarged.    The pulmonary vascularity is increased centrally.    Minimal increased attenuation at the lung basis suggestive of atelectasis.    No pneumothorax.  No large pleural effusion.    The osseous structures appear normal.                                       Medications   sodium chloride 0.9% flush 10 mL (has no administration in time range)   naloxone 0.4 mg/mL injection 0.02 mg (has no administration in time range)   glucose chewable tablet 16 g (has no administration in time range)   glucose chewable tablet 24 g (has no administration in time range)   glucagon (human recombinant) injection 1 mg (has no administration in time range)   dextrose 10% bolus 125 mL 125 mL (has no administration in time range)   dextrose 10% bolus 250 mL 250 mL (has no administration in time range)   apixaban tablet 5 mg (5 mg Oral Given 1/27/24 2035)   atorvastatin tablet 80 mg (has no administration in time range)   clopidogreL tablet 75 mg (has no administration in time range)   metoprolol succinate (TOPROL-XL) 24 hr tablet 25 mg (has no administration in time range)   midodrine tablet 5 mg (has no administration in time range)   pantoprazole  EC tablet 40 mg (has no administration in time range)   tacrolimus capsule 0.5 mg (0.5 mg Oral Given 1/27/24 2107)   buPROPion TB24 tablet 150 mg (has no administration in time range)   insulin detemir U-100 (Levemir) pen 10 Units (10 Units Subcutaneous Given 1/27/24 2048)   insulin aspart U-100 pen 0-5 Units (has no administration in time range)   furosemide injection 40 mg (40 mg Intravenous Given 1/27/24 2035)     Medical Decision Making  68-year-old male with severe aortic stenosis, anticoagulated, AFib, liver transplant presenting for shortness of breath the day following discharge.  On presentation, patient hypoxic balloon SpO2 of 90 on room air, improved to 96% on 2 L via nasal cannula.  Exam with stable aortic stenosis systolic murmur, however no pitting edema, JVD, or crackles noted.    Cardiac workup completed as above in ED course.    Differential diagnosis includes ACS, PE, viral URI, AAA, pulmonary edema, pleural effusion, CHF exacerbation, symptomatic aortic stenosis.     Given recurrent new oxygen requirement, patient requires readmission for further fluid optimization, and cardiac optimization..      Amount and/or Complexity of Data Reviewed  Independent Historian:      Details: Previous admitting hospital medicine attending at bedside, completed verbal report of previous admission. Appreciated by ED team.   Labs:  Decision-making details documented in ED Course.  Radiology: independent interpretation performed. Decision-making details documented in ED Course.  ECG/medicine tests: independent interpretation performed. Decision-making details documented in ED Course.    Risk  Decision regarding hospitalization.               ED Course as of 01/27/24 2311   Sat Jan 27, 2024   1649 Comprehensive metabolic panel(!)  Creatinine with mild interval worsening from yesterday, appears euvolemic [KB]   1649 Comprehensive metabolic panel(!)  Electrolyte abnormality, no evidence of hepatobiliary obstruction. [KB]    1650 CBC auto differential(!)  Anemia near baseline, not at transfusion threshold, no leukocytosis or thrombocytopenia. [KB]   1650 Brain natriuretic peptide(!)  Elevated, however stable compared to previous lab results. [KB]   1650 Troponin I: 0.022  Negative, ACS less likely [KB]   1650 EKG:  Sinus rhythm, with intermittent PACs, rate of 80, left axis deviation with first-degree AV block, no ST segment elevation, no QTC prolongation, no Sgarbossa criteria met [KB]   1722 COVID-19 Rapid Screening [KB]   1722 Influenza A & B by Molecular [KB]   1758 Influenza A & B by Molecular [KB]   1758 Influenza A, Molecular: Negative [KB]   1758 Flu A & B Source: Nasal swab [KB]   1758 X-Ray Chest 1 View  On my personal interpretation there is similar pulmonary edema and cardiomegaly, however new pleural effusion on the left costophrenic angle noted. [KB]   1839 COVID-19 Rapid Screening [KB]   1839 SARS-CoV-2 RNA, Amplification, Qual: Negative [KB]   2137 Protein, UA(!): Trace [KB]   2137 Glucose, UA(!): Trace [KB]   2137 POCT Glucose(!): 184 [KB]      ED Course User Index  [KB] Mirela Florence MD                                 Clinical Impression:  Final diagnoses:  [R06.02] SOB (shortness of breath)  [R07.9] Chest pain  [I35.0] Aortic valve stenosis, etiology of cardiac valve disease unspecified (Primary)          ED Disposition Condition    Admit                 Mirela Florence MD  Resident  01/27/24 [id number]

## 2024-01-27 NOTE — ED TRIAGE NOTES
Tej Purdy, a 68 y.o. male presents to the ED w/ complaint of chest pain      Triage note:  Chief Complaint   Patient presents with    Chest Pain     Chest pain with SOB, just discharged yesterday. +cardiac history     Review of patient's allergies indicates:   Allergen Reactions    Bee pollens Swelling     BEE STINGS swells body     Past Medical History:   Diagnosis Date    Acute appendicitis 06/02/2023    Atrial fibrillation     Calculus of ureter 12/22/2023    Cholangiocarcinoma 03/16/2022    Cirrhosis 11/23/2020    Diabetes mellitus, type 2     Diabetic neuropathy 11/24/2020    Disorder of kidney and ureter     HTN (hypertension) 11/23/2020    Hyperlipidemia 11/23/2020    Kidney stones 11/23/2020    S/p lithotripsy 2020    Leukocytosis 01/15/2021    Liver mass 11/23/2020    CASTILLO (nonalcoholic steatohepatitis) 11/23/2020    Nausea and vomiting 08/21/2023    Other ascites 03/16/2022    PAD (peripheral artery disease)     Portal hypertension 11/23/2020    Skin cancer 11/23/2020

## 2024-01-27 NOTE — FIRST PROVIDER EVALUATION
Emergency Department TeleTriage Encounter Note      CHIEF COMPLAINT    Chief Complaint   Patient presents with    Chest Pain     Chest pain with SOB, just discharged yesterday. +cardiac history       VITAL SIGNS   Initial Vitals [01/27/24 1420]   BP Pulse Resp Temp SpO2   (!) 147/78 76 18 98.4 °F (36.9 °C) (!) 87 %      MAP       --            ALLERGIES    Review of patient's allergies indicates:   Allergen Reactions    Bee pollens Swelling     BEE STINGS swells body       PROVIDER TRIAGE NOTE  68-year-old male presents to the ER with complaints new onset shortness of breath and chest pain.  He was recently discharged from the hospital after having a cardioversion and ASHISH procedure test yesterday.  He states symptoms began shortly after leaving.  He states since arriving to the ER they placed oxygen on him urinary feeling improvement in symptoms.  He reports no syncopal episodes.  No nausea vomiting diarrhea.  No fever.    AAOx3, slightly labored respirations, initial O2 sat was 87% on room air, improved with nasal cannula oxygen to 95%.  Stable blood pressure and heart rate.  normal coloration of skin, sitting upright in triage chair.     ORDERS  Labs Reviewed - No data to display    ED Orders (720h ago, onward)      Start Ordered     Status Ordering Provider    01/27/24 1425 01/27/24 1424  EKG 12-lead  Once         Completed by CHARLEE WALKER on 1/27/2024 at  2:41 PM SONYA STEPHENSON              Virtual Visit Note: The provider triage portion of this emergency department evaluation and documentation was performed via [employer], a HIPAA-compliant telemedicine application, in concert with a tele-presenter in the room. A face to face patient evaluation with one of my colleagues will occur once the patient is placed in an emergency department room.      DISCLAIMER: This note was prepared with [employer] voice recognition transcription software. Garbled syntax, mangled pronouns, and other bizarre constructions may  be attributed to that software system.

## 2024-01-27 NOTE — Clinical Note
Diagnosis: SOB (shortness of breath) [684731]   Future Attending Provider: SILAS PENALOZA [[id number]]   Reason for IP Medical Treatment  (Clinical interventions that can only be accomplished in the IP setting? ) :: hypoxia   I certify that Inpatient services for greater than or equal to 2 midnights are medically necessary:: Yes   Plans for Post-Acute care--if anticipated (pick the single best option):: A. No post acute care anticipated at this time

## 2024-01-28 ENCOUNTER — PATIENT MESSAGE (OUTPATIENT)
Dept: TRANSPLANT | Facility: CLINIC | Age: 69
End: 2024-01-28
Payer: MEDICARE

## 2024-01-28 LAB
ALBUMIN SERPL BCP-MCNC: 3.3 G/DL (ref 3.5–5.2)
ALP SERPL-CCNC: 117 U/L (ref 55–135)
ALT SERPL W/O P-5'-P-CCNC: 15 U/L (ref 10–44)
ANION GAP SERPL CALC-SCNC: 7 MMOL/L (ref 8–16)
AST SERPL-CCNC: 18 U/L (ref 10–40)
BASOPHILS # BLD AUTO: 0.06 K/UL (ref 0–0.2)
BASOPHILS NFR BLD: 0.7 % (ref 0–1.9)
BILIRUB SERPL-MCNC: 1.3 MG/DL (ref 0.1–1)
BUN SERPL-MCNC: 34 MG/DL (ref 8–23)
CALCIUM SERPL-MCNC: 8.8 MG/DL (ref 8.7–10.5)
CHLORIDE SERPL-SCNC: 101 MMOL/L (ref 95–110)
CO2 SERPL-SCNC: 28 MMOL/L (ref 23–29)
CREAT SERPL-MCNC: 1.2 MG/DL (ref 0.5–1.4)
DIFFERENTIAL METHOD BLD: ABNORMAL
EOSINOPHIL # BLD AUTO: 0.1 K/UL (ref 0–0.5)
EOSINOPHIL NFR BLD: 1.6 % (ref 0–8)
ERYTHROCYTE [DISTWIDTH] IN BLOOD BY AUTOMATED COUNT: 19.7 % (ref 11.5–14.5)
EST. GFR  (NO RACE VARIABLE): >60 ML/MIN/1.73 M^2
GLUCOSE SERPL-MCNC: 186 MG/DL (ref 70–110)
HCT VFR BLD AUTO: 30.7 % (ref 40–54)
HGB BLD-MCNC: 8.4 G/DL (ref 14–18)
IMM GRANULOCYTES # BLD AUTO: 0.05 K/UL (ref 0–0.04)
IMM GRANULOCYTES NFR BLD AUTO: 0.6 % (ref 0–0.5)
LYMPHOCYTES # BLD AUTO: 0.9 K/UL (ref 1–4.8)
LYMPHOCYTES NFR BLD: 10.9 % (ref 18–48)
MAGNESIUM SERPL-MCNC: 1.6 MG/DL (ref 1.6–2.6)
MCH RBC QN AUTO: 19.3 PG (ref 27–31)
MCHC RBC AUTO-ENTMCNC: 27.4 G/DL (ref 32–36)
MCV RBC AUTO: 71 FL (ref 82–98)
MONOCYTES # BLD AUTO: 1.2 K/UL (ref 0.3–1)
MONOCYTES NFR BLD: 14.6 % (ref 4–15)
NEUTROPHILS # BLD AUTO: 5.8 K/UL (ref 1.8–7.7)
NEUTROPHILS NFR BLD: 71.6 % (ref 38–73)
NRBC BLD-RTO: 0 /100 WBC
PHOSPHATE SERPL-MCNC: 3.5 MG/DL (ref 2.7–4.5)
PLATELET # BLD AUTO: 156 K/UL (ref 150–450)
PMV BLD AUTO: ABNORMAL FL (ref 9.2–12.9)
POCT GLUCOSE: 218 MG/DL (ref 70–110)
POCT GLUCOSE: 219 MG/DL (ref 70–110)
POCT GLUCOSE: 235 MG/DL (ref 70–110)
POCT GLUCOSE: 239 MG/DL (ref 70–110)
POCT GLUCOSE: 254 MG/DL (ref 70–110)
POTASSIUM SERPL-SCNC: 3.7 MMOL/L (ref 3.5–5.1)
PROT SERPL-MCNC: 6.6 G/DL (ref 6–8.4)
RBC # BLD AUTO: 4.35 M/UL (ref 4.6–6.2)
SODIUM SERPL-SCNC: 136 MMOL/L (ref 136–145)
TACROLIMUS BLD-MCNC: 8.5 NG/ML (ref 5–15)
WBC # BLD AUTO: 8.04 K/UL (ref 3.9–12.7)

## 2024-01-28 PROCEDURE — 36415 COLL VENOUS BLD VENIPUNCTURE: CPT

## 2024-01-28 PROCEDURE — 80197 ASSAY OF TACROLIMUS: CPT

## 2024-01-28 PROCEDURE — 84100 ASSAY OF PHOSPHORUS: CPT

## 2024-01-28 PROCEDURE — 63600175 PHARM REV CODE 636 W HCPCS

## 2024-01-28 PROCEDURE — 20600001 HC STEP DOWN PRIVATE ROOM

## 2024-01-28 PROCEDURE — 80053 COMPREHEN METABOLIC PANEL: CPT

## 2024-01-28 PROCEDURE — 25000003 PHARM REV CODE 250

## 2024-01-28 PROCEDURE — 83735 ASSAY OF MAGNESIUM: CPT

## 2024-01-28 PROCEDURE — 85025 COMPLETE CBC W/AUTO DIFF WBC: CPT

## 2024-01-28 PROCEDURE — 25000242 PHARM REV CODE 250 ALT 637 W/ HCPCS: Performed by: STUDENT IN AN ORGANIZED HEALTH CARE EDUCATION/TRAINING PROGRAM

## 2024-01-28 RX ORDER — MIDODRINE HYDROCHLORIDE 5 MG/1
5 TABLET ORAL EVERY 8 HOURS PRN
Status: DISCONTINUED | OUTPATIENT
Start: 2024-01-28 | End: 2024-02-02 | Stop reason: HOSPADM

## 2024-01-28 RX ORDER — INSULIN ASPART 100 [IU]/ML
3 INJECTION, SOLUTION INTRAVENOUS; SUBCUTANEOUS
Status: DISCONTINUED | OUTPATIENT
Start: 2024-01-28 | End: 2024-01-30

## 2024-01-28 RX ORDER — ONDANSETRON 4 MG/1
4 TABLET, ORALLY DISINTEGRATING ORAL EVERY 8 HOURS PRN
Status: DISCONTINUED | OUTPATIENT
Start: 2024-01-28 | End: 2024-02-02 | Stop reason: HOSPADM

## 2024-01-28 RX ORDER — SODIUM CHLORIDE 0.9 % (FLUSH) 0.9 %
10 SYRINGE (ML) INJECTION
Status: DISCONTINUED | OUTPATIENT
Start: 2024-01-28 | End: 2024-02-02 | Stop reason: HOSPADM

## 2024-01-28 RX ADMIN — ATORVASTATIN CALCIUM 80 MG: 40 TABLET, FILM COATED ORAL at 09:01

## 2024-01-28 RX ADMIN — INSULIN ASPART 3 UNITS: 100 INJECTION, SOLUTION INTRAVENOUS; SUBCUTANEOUS at 09:01

## 2024-01-28 RX ADMIN — EMPAGLIFLOZIN 10 MG: 10 TABLET, FILM COATED ORAL at 04:01

## 2024-01-28 RX ADMIN — PANTOPRAZOLE SODIUM 40 MG: 40 TABLET, DELAYED RELEASE ORAL at 09:01

## 2024-01-28 RX ADMIN — APIXABAN 5 MG: 5 TABLET, FILM COATED ORAL at 08:01

## 2024-01-28 RX ADMIN — INSULIN ASPART 1 UNITS: 100 INJECTION, SOLUTION INTRAVENOUS; SUBCUTANEOUS at 08:01

## 2024-01-28 RX ADMIN — INSULIN ASPART 3 UNITS: 100 INJECTION, SOLUTION INTRAVENOUS; SUBCUTANEOUS at 04:01

## 2024-01-28 RX ADMIN — INSULIN ASPART 2 UNITS: 100 INJECTION, SOLUTION INTRAVENOUS; SUBCUTANEOUS at 04:01

## 2024-01-28 RX ADMIN — INSULIN ASPART 2 UNITS: 100 INJECTION, SOLUTION INTRAVENOUS; SUBCUTANEOUS at 09:01

## 2024-01-28 RX ADMIN — MIDODRINE HYDROCHLORIDE 5 MG: 5 TABLET ORAL at 12:01

## 2024-01-28 RX ADMIN — FUROSEMIDE 40 MG: 10 INJECTION, SOLUTION INTRAVENOUS at 08:01

## 2024-01-28 RX ADMIN — INSULIN DETEMIR 10 UNITS: 100 INJECTION, SOLUTION SUBCUTANEOUS at 09:01

## 2024-01-28 RX ADMIN — TACROLIMUS 0.5 MG: 0.5 CAPSULE ORAL at 05:01

## 2024-01-28 RX ADMIN — BUPROPION HYDROCHLORIDE 150 MG: 150 TABLET, EXTENDED RELEASE ORAL at 09:01

## 2024-01-28 RX ADMIN — TACROLIMUS 0.5 MG: 0.5 CAPSULE ORAL at 09:01

## 2024-01-28 RX ADMIN — INSULIN ASPART 3 UNITS: 100 INJECTION, SOLUTION INTRAVENOUS; SUBCUTANEOUS at 12:01

## 2024-01-28 RX ADMIN — INSULIN DETEMIR 10 UNITS: 100 INJECTION, SOLUTION SUBCUTANEOUS at 08:01

## 2024-01-28 RX ADMIN — APIXABAN 5 MG: 5 TABLET, FILM COATED ORAL at 09:01

## 2024-01-28 RX ADMIN — METOPROLOL SUCCINATE 25 MG: 25 TABLET, EXTENDED RELEASE ORAL at 09:01

## 2024-01-28 RX ADMIN — MIDODRINE HYDROCHLORIDE 5 MG: 5 TABLET ORAL at 09:01

## 2024-01-28 RX ADMIN — CLOPIDOGREL BISULFATE 75 MG: 75 TABLET ORAL at 09:01

## 2024-01-28 RX ADMIN — FUROSEMIDE 40 MG: 10 INJECTION, SOLUTION INTRAVENOUS at 10:01

## 2024-01-28 NOTE — ED NOTES
According to Luann in the war room rate and rhythm are 77 and normal sinus on telemetry box 1142.

## 2024-01-28 NOTE — ASSESSMENT & PLAN NOTE
Hx of HFrEF (LVEF 20-25%); not on GDMT, currently on midodrine. CXR c/w pulmonary edema, increased pulmonary venous congestion. Tolerated IV diuresis with furosemide 40 BID during recent admit. Not on lasix at home.     --will diurese with furosemide 40 IV BID.   --cardiac diet, fluid restriction at 1.5L/day  --strict I/Os and daily standing weights  --maintain on telemetry and daily EKGs, maintain Mag >2 & K+ >4.  --ambulate as tolerated with assistance.   --may benefit with transition to oral diuresis upon discharge;    --referral to heart failure transitional care clinic with discharge.  --see other details in HFrEF.

## 2024-01-28 NOTE — ASSESSMENT & PLAN NOTE
Home meds for hypertension were reviewed and noted below.   Hypertension Medications               metoprolol succinate (TOPROL-XL) 25 MG 24 hr tablet Take 1 tablet (25 mg total) by mouth once daily.          --continue home antihypertensives

## 2024-01-28 NOTE — H&P
Eric Bañuelos - Cardiology [facility] Medicine  History & Physical    Patient Name: Tej Purdy  MRN: [id number]  Patient Class: IP- Inpatient  Admission Date: 1/27/2024  Attending Physician: Fabio Akins MD   Primary Care Provider: Thais Maxwell MD    Patient information was obtained from patient, past medical records, and ER records.     Subjective:     Principal Problem:Acute hypoxic respiratory failure    Chief Complaint:   Chief Complaint   Patient presents with    Chest Pain     Chest pain with SOB, just discharged yesterday. +cardiac history        HPI: Tej Purdy is a 68 year old man with hx of CASTILLO cirrhosis, cholangiocarcinoma s/p liver transplant in 2022 on tacrolimus, type 2 diabetes mellitus on insulin, hypertension, coronary artery disease s/p LHC +PCI (12/23), severe aortic stenosis, heart failure reduced ejection fraction with LVEF of 20-25%, persistent atrial fibrillation s/p several cardioversions on apixaban, peripheral arterial disease who presents with recurrent shortness of breath and chest tightness. Recently admitted to  on 01/25 for acute hypoxic respiratory failure in the setting of acute on chronic heart failure exacerbation likely due to recurrent atrial fibrillation for which he is now s/p ASHISH + successful cardioversion with IC on 01/26. He was diuresed with furosemide 40 IV BID during this brief hospital admission with appropriate response and was discharged the evening of 01/26 after improvement in his symptoms.     While in the ED: patient found to be hypoxic w SpO2 87% on RA upon arrival in the ED with improvement after 2L NC, otherwise HDS. EKG without ST elevation. BNP >962. Trop negative. CXR with cardiomegaly with increased pulmonary venous pressure likely pulmonary edema/CHF, grossly unchanged from prior.     Admitted to  for acute hypoxic respiratory failure in the setting of acute on chronic congestive heart failure secondary to volume overload.      Past Medical History:   Diagnosis Date    Acute appendicitis 06/02/2023    Atrial fibrillation     Calculus of ureter 12/22/2023    Cholangiocarcinoma 03/16/2022    Cirrhosis 11/23/2020    Diabetes mellitus, type 2     Diabetic neuropathy 11/24/2020    Disorder of kidney and ureter     HTN (hypertension) 11/23/2020    Hyperlipidemia 11/23/2020    Kidney stones 11/23/2020    S/p lithotripsy 2020    Leukocytosis 01/15/2021    Liver mass 11/23/2020    CASTILLO (nonalcoholic steatohepatitis) 11/23/2020    Nausea and vomiting 08/21/2023    Other ascites 03/16/2022    PAD (peripheral artery disease)     Portal hypertension 11/23/2020    Skin cancer 11/23/2020       Past Surgical History:   Procedure Laterality Date    ANGIOGRAM, CORONARY, WITH LEFT HEART CATHETERIZATION N/A 12/26/2023    Procedure: Angiogram, Coronary, with Left Heart Cath;  Surgeon: Carlos King MD;  Location: [facility] CATH LAB;  Service: Cardiology;  Laterality: N/A;    COLONOSCOPY  10/2020    ESOPHAGEAL MANOMETRY WITH MEASUREMENT OF IMPEDANCE N/A 7/17/2023    Procedure: MANOMETRY, ESOPHAGUS, WITH IMPEDANCE MEASUREMENT;  Surgeon: Klarissa Zamora MD;  [facility]  Service: Gastroenterology;  Laterality: N/A;  pt diabetic  liver txp  prep insructions sent to pt via email and portal -    ESOPHAGOGASTRODUODENOSCOPY  10/2020    ESOPHAGOGASTRODUODENOSCOPY N/A 7/1/2021    Procedure: EGD (ESOPHAGOGASTRODUODENOSCOPY);  Surgeon: Jovan David MD;  [facility]  Service: Endoscopy;  Laterality: N/A;  HCC. Listed for liver transplant. EGD for variceal surveillance.  cardiac clearance and blood thinenr approval received, see telephone encounter 6/23/21-BB  fully vaccinated-BB  labs same day of procedure-BB    EXCISION OF HYDROCELE Right     hemorroid surgery      hemorroidectomy      KIDNEY STONE SURGERY      LAPAROSCOPIC APPENDECTOMY N/A 6/2/2023    Procedure: APPENDECTOMY, LAPAROSCOPIC;  Surgeon: Shan Ross,  MD;  Location: [facility] OR [facility] FLR;  Service: General;  Laterality: N/A;    LEFT HEART CATHETERIZATION N/A 1/27/2021    Procedure: Left heart cath;  Surgeon: Kris Shelton MD;  Location: [facility] CATH LAB;  Service: Cardiology;  Laterality: N/A;    liver mass removal      LIVER TRANSPLANT N/A 1/6/2022    Procedure: TRANSPLANT, LIVER;  Surgeon: Lizet Garcia MD;  Location: [facility] OR [facility];  Service: Transplant;  Laterality: N/A;    RIGHT HEART CATHETERIZATION Right 5/7/2021    Procedure: INSERTION, CATHETER, RIGHT HEART;  Surgeon: Jaden Schuster MD;  Location: [facility] CATH LAB;  Service: Cardiology;  Laterality: Right;    STENT, DRUG ELUTING, SINGLE VESSEL, CORONARY  12/26/2023    Procedure: Stent, Drug Eluting, Single Vessel, Coronary;  Surgeon: Carlos King MD;  Location: [facility] CATH LAB;  Service: Cardiology;;    TREATMENT OF CARDIAC ARRHYTHMIA N/A 5/19/2021    Procedure: CARDIOVERSION;  Surgeon: Didier Tilley MD;  Location: [facility] EP LAB;  Service: Cardiology;  Laterality: N/A;  a fib, dccv/johny, mac, dm. sscu    TREATMENT OF CARDIAC ARRHYTHMIA N/A 5/31/2021    Procedure: CARDIOVERSION;  Surgeon: Daniel Arambula MD;  Location: [facility] EP LAB;  Service: Cardiology;  Laterality: N/A;  afib, DCCV, anest., DM, 3 prep    TREATMENT OF CARDIAC ARRHYTHMIA N/A 7/7/2021    Procedure: Cardioversion or Defibrillation;  Surgeon: Didier Tilley MD;  Location: [facility] EP LAB;  Service: Cardiology;  Laterality: N/A;  AF, JOHNY (cancel if complaint), DCCV, MAC, DM, 3 Prep    TREATMENT OF CARDIAC ARRHYTHMIA N/A 7/27/2021    Procedure: Cardioversion or Defibrillation;  Surgeon: Didier Tilley MD;  Location: [facility] EP LAB;  Service: Cardiology;  Laterality: N/A;  AF, JOHNY (cx if complaint), DCCV, MAC, DM, 3 Prep       Review of patient's allergies indicates:   Allergen Reactions    Bee pollens Swelling     BEE STINGS swells body       Current Facility-Administered Medications on File Prior to Encounter   Medication    sodium chloride 0.9%  bolus 1,000 mL     Current Outpatient Medications on File Prior to Encounter   Medication Sig    apixaban (ELIQUIS) 5 mg Tab Take 1 tablet (5 mg total) by mouth 2 (two) times daily.    aspirin (ECOTRIN) 81 MG EC tablet Take 81 mg by mouth once daily.    atorvastatin (LIPITOR) 80 MG tablet Take 1 tablet (80 mg total) by mouth once daily.    blood sugar diagnostic Strp To check BG 2 times daily, to use with insurance preferred meter (patient has a [employer] self-monitoring glucose meter)    blood-glucose meter kit To check BG 2 times daily, to use with insurance preferred meter    buPROPion (WELLBUTRIN XL) 150 MG TB24 tablet Take 1 tablet (150 mg total) by mouth every morning.    calcium carbonate-vitamin D3 600 mg-20 mcg (800 unit) Tab Take 1 tablet by mouth once daily.    clopidogreL (PLAVIX) 75 mg tablet Take 1 tablet (75 mg total) by mouth once daily.    gabapentin (NEURONTIN) 300 MG capsule Take 1 capsule (300 mg total) by mouth 3 (three) times daily as needed (neuropathy).    insulin (LANTUS SOLOSTAR U-100 INSULIN) glargine 100 units/mL SubQ pen INJECT 22 UNITS INTO THE SKIN EVERY EVENING. ADJUST DOSE UNTIL AM GLUCOSE GOAL . MAX DOSE 30 UNITS/DY    insulin lispro (HUMALOG KWIKPEN INSULIN) 100 unit/mL pen Inject 6 Units into the skin 3 (three) times daily with meals. Plus sliding scale, MDD: 48 units    lancets Misc To check BG 2 times daily, to use with insurance preferred meter    magnesium oxide (MAG-OX) 400 mg (241.3 mg magnesium) tablet Take 2 tablets (800 mg total) by mouth 3 (three) times daily.    metoprolol succinate (TOPROL-XL) 25 MG 24 hr tablet Take 1 tablet (25 mg total) by mouth once daily.    midodrine (PROAMATINE) 10 MG tablet Take 0.5 tablets (5 mg total) by mouth 3 (three) times daily with meals.    multivitamin capsule Take 1 capsule by mouth once daily.    omega-3 fatty acids/fish oil (FISH OIL-OMEGA-3 FATTY ACIDS) 300-1,000 mg capsule Take 1 capsule by mouth once daily.     ondansetron  "(ZOFRAN-ODT) 4 MG TbDL Dissolve 1 tablet (4 mg total) by mouth every 8 (eight) hours as needed (nausea).    pantoprazole (PROTONIX) 40 MG tablet Take 1 tablet (40 mg total) by mouth once daily.    pen needle, diabetic (BD ULTRA-FINE GÉNESIS PEN NEEDLE) 32 gauge x 5/32" Ndle Use to inject insulin 4 times daily    QUEtiapine (SEROQUEL) 100 MG Tab Take 1 tablet (100 mg total) by mouth every evening.    tacrolimus (PROGRAF) 0.5 MG Cap Take 1 capsule (0.5 mg total) by mouth every 12 (twelve) hours.     Family History       Problem Relation (Age of Onset)    Coronary artery disease Father          Tobacco Use    Smoking status: Former     Current packs/day: 0.00     Types: Cigarettes     Quit date: 1980     Years since quittin.0    Smokeless tobacco: Former   Substance and Sexual Activity    Alcohol use: Not Currently    Drug use: Never    Sexual activity: Not on file     Review of Systems   Constitutional:  Negative for chills and fever.   HENT:  Negative for congestion, rhinorrhea, sore throat and trouble swallowing.    Eyes:  Negative for visual disturbance.   Respiratory:  Positive for chest tightness and shortness of breath. Negative for cough, choking and wheezing.    Cardiovascular:  Negative for chest pain, palpitations and leg swelling.   Gastrointestinal:  Negative for abdominal pain, constipation, diarrhea, nausea and vomiting.   Genitourinary:  Negative for decreased urine volume, difficulty urinating, dysuria, frequency and urgency.   Musculoskeletal:  Negative for arthralgias, back pain and myalgias.   Skin:  Negative for rash and wound.   Neurological:  Negative for dizziness, weakness, light-headedness and headaches.     Objective:     Vital Signs (Most Recent):  Temp: 98 °F (36.7 °C) (24)  Pulse: 79 (24)  Resp: 20 (24)  BP: 130/67 (24)  SpO2: 96 % (24) Vital Signs (24h Range):  Temp:  [98 °F (36.7 °C)-98.4 °F (36.9 °C)] 98 °F (36.7 °C)  Pulse:  " [76-79] 79  Resp:  [18-20] 20  SpO2:  [87 %-96 %] 96 %  BP: (130-147)/(64-78) 130/67     Weight: 93.9 kg (207 lb)  Body mass index is 28.07 kg/m².     Physical Exam  Vitals reviewed.   Constitutional:       General: He is not in acute distress.     Appearance: Normal appearance. He is not ill-appearing or toxic-appearing.      Comments: HOB elevated.   HENT:      Head: Normocephalic and atraumatic.      Nose: Nose normal.      Mouth/Throat:      Mouth: Mucous membranes are moist.   Eyes:      General: No scleral icterus.     Extraocular Movements: Extraocular movements intact.      Conjunctiva/sclera: Conjunctivae normal.      Pupils: Pupils are equal, round, and reactive to light.   Neck:      Comments: No noted JVD  Cardiovascular:      Rate and Rhythm: Normal rate and regular rhythm.      Pulses: Normal pulses.      Heart sounds: Murmur (3/6 systolic murmur) heard.   Pulmonary:      Effort: Pulmonary effort is normal. No respiratory distress.      Breath sounds: Rales present. No wheezing or rhonchi.      Comments: Breathing comfortably on 2L NC; dyspneic on exertion.   Abdominal:      General: Abdomen is flat. Bowel sounds are normal. There is no distension.      Palpations: Abdomen is soft.      Tenderness: There is no abdominal tenderness. There is no guarding or rebound.   Musculoskeletal:         General: No swelling. Normal range of motion.      Right lower leg: No edema.      Left lower leg: No edema.   Skin:     General: Skin is warm and dry.      Capillary Refill: Capillary refill takes less than 2 seconds.   Neurological:      General: No focal deficit present.      Mental Status: He is alert and oriented to person, place, and time.      Cranial Nerves: No cranial nerve deficit.      Motor: No weakness.   Psychiatric:         Mood and Affect: Mood normal.         Behavior: Behavior normal.         Thought Content: Thought content normal.         Judgment: Judgment normal.              CRANIAL NERVES      CN III, IV, VI   Pupils are equal, round, and reactive to light.       Significant Labs: All pertinent labs within the past 24 hours have been reviewed.  CBC:   Recent Labs   Lab 01/26/24  0427 01/27/24  1544   WBC 8.78 10.28   HGB 8.7* 9.1*   HCT 32.2* 32.7*    180     CMP:   Recent Labs   Lab 01/26/24  0427 01/27/24  1544    137   K 3.9 4.4    100   CO2 27 29   * 233*   BUN 25* 37*   CREATININE 1.2 1.5*   CALCIUM 8.9 9.3   PROT 6.7 7.1   ALBUMIN 3.4* 3.5   BILITOT 1.1* 1.5*   ALKPHOS 131 127   AST 21 22   ALT 22 19   ANIONGAP 8 8     Coagulation:   Recent Labs   Lab 01/27/24  1544   INR 1.4*     Troponin:   Recent Labs   Lab 01/27/24  1544   TROPONINI 0.022       Significant Imaging: I have reviewed all pertinent imaging results/findings within the past 24 hours.  Assessment/Plan:     * Acute hypoxic respiratory failure  68 year old male with hx of HFrEF (LVEF 20-25%), persistent atrial fibrillation refractory to multiple cardioversions who presents with recurrent shortness of breath and chest tightness similar to his presentation few days prior. Recently discharged after improvement of symptoms s/p ASHISH + successful cardioversion with Interventional Cardiology on 01/26/24. Hypoxic in the ED with SpO2 87% on RA, improved with 2L NC, otherwise HDS. EKG w NSR, intermittent PACs. CXR c/w increased pulmonary venous congestion, pulmonary edema likely related to CHF.     Admitted to  for acute hypoxic respiratory failure in the setting of acute on chronic heart failure exacerbation due to volume overload.     --continue IV diuresis with furosemide 40 BID.  --wean oxygen as tolerated.  --strict I's/O's, daily weight  --fluid restriction 1.5L/day   --tele, daily labs.  --will benefit with transition to oral diuretic upon discharge.    Acute on chronic heart failure with reduced ejection fraction (HFrEF)  Hx of HFrEF (LVEF 20-25%); not on GDMT, currently on midodrine. CXR c/w pulmonary edema,  increased pulmonary venous congestion. Tolerated IV diuresis with furosemide 40 BID during recent admit. Not on lasix at home.     --will diurese with furosemide 40 IV BID.   --cardiac diet, fluid restriction at 1.5L/day  --strict I/Os and daily standing weights  --maintain on telemetry and daily EKGs, maintain Mag >2 & K+ >4.  --ambulate as tolerated with assistance.   --may benefit with transition to oral diuresis upon discharge;    --referral to heart failure transitional care clinic with discharge.  --see other details in HFrEF.       History of liver transplant  Hx of CASTILLO cirrhosis, choriocarcinoma s/p liver transplant 2022 on tacrolimus. Last tacrolimus level 7.8 (01/26/24).    --continue home tacrolimus.  --daily CMP and tacrolimus levels  --liver transplant surgery consulted, appreciate recs.     HFrEF (heart failure with reduced ejection fraction)  Hx of HFrEF with LVEF 20-25%; not on GDMT other than metoprolol succinate 25 daily due to being on midodrine.    Baseline BNP:   Lab Results   Component Value Date     (H) 01/27/2024     (H) 01/25/2024     (H) 12/29/2023     (H) 12/22/2023    BNP 91 04/11/2022      Results for orders placed during the hospital encounter of 12/22/23    Echo    Interpretation Summary    Left Ventricle: The left ventricle is normal in size. Mildly increased wall thickness. There is concentric remodeling. There is severely reduced systolic function with a visually estimated ejection fraction of 20 - 25%. All walls except for the basal septum are hypokinetic. When directly compared to images from 7/5/2022 there has been a significant change in left ventricular systolic function.    LV Diastolic function: Unable to assess diastolic function due to E-A fusion. Average E/e' ratio is 11.    Right Ventricle: Normal right ventricular cavity size. Wall thickness is normal. Right ventricle wall motion  is normal. Systolic function is normal.    Left Atrium: Left  atrium is severely dilated.    Aortic Valve: There is moderate to severe low gradient aortic stenosis. Aortic valve area by VTI is 0.97 cm². LVOT diameter is 2.4 cm. Aortic valve peak velocity is 3.24 m/s. Mean gradient is 28 mmHg. The dimensionless index is 0.21. There is no significant regurgitation.    Mitral Valve: There is mild to moderate regurgitation.    Tricuspid Valve: There is mild to moderate regurgitation.    Pulmonary Artery: The estimated pulmonary artery systolic pressure is 38 mm Hg.    IVC/SVC: Low central venous pressure.    Pericardium: There is a very small circumferential effusion; slightly more fluid is located laterally.        Coronary artery disease involving native coronary artery of native heart without angina pectoris  --continue home clopidogrel and high-intensity statin.  --continuous telemetry    Type 2 diabetes mellitus, with long-term current use of insulin  Last HbA1c 7.5 (01/26/24). Home insulin regimen includes glargine 22U nightly, lispro 6U TIDWM and SSI.     --insulin detemir 10U BID, LDSSI.   --diabetic diet  --POCT glucose checks TIDWM and qhs  --goal glucose 140-180      Antihyperglycemics (From admission, onward)      Start     Stop Route Frequency Ordered    01/27/24 2100  insulin detemir U-100 (Levemir) pen 10 Units         -- SubQ 2 times daily 01/27/24 1937 01/27/24 2036  insulin aspart U-100 pen 0-5 Units         -- SubQ Before meals & nightly PRN 01/27/24 1937                Primary hypertension  Home meds for hypertension were reviewed and noted below.   Hypertension Medications               metoprolol succinate (TOPROL-XL) 25 MG 24 hr tablet Take 1 tablet (25 mg total) by mouth once daily.          --continue home antihypertensives    Hyperlipidemia  --continue home statin.      VTE Risk Mitigation (From admission, onward)           Ordered     apixaban tablet 5 mg  2 times daily         01/27/24 1932     IP VTE HIGH RISK PATIENT  Once         01/27/24 1850      Place sequential compression device  Until discontinued         01/27/24 1850     Reason for No Pharmacological VTE Prophylaxis  Once        Question:  Reasons:  Answer:  Already adequately anticoagulated on oral Anticoagulants    01/27/24 1850                              Page Velazquez MD  Department of Hospital Medicine  [facility] - Cardiology Stepdown

## 2024-01-28 NOTE — ASSESSMENT & PLAN NOTE
Last HbA1c 7.5 (01/26/24). Home insulin regimen includes glargine 22U nightly, lispro 6U TIDWM and SSI.     --insulin detemir 10U BID, LDSSI.   --diabetic diet  --POCT glucose checks TIDWM and qhs  --goal glucose 140-180      Antihyperglycemics (From admission, onward)      Start     Stop Route Frequency Ordered    01/27/24 2100  insulin detemir U-100 (Levemir) pen 10 Units         -- SubQ 2 times daily 01/27/24 1937 01/27/24 2036  insulin aspart U-100 pen 0-5 Units         -- SubQ Before meals & nightly PRN 01/27/24 1937

## 2024-01-28 NOTE — ASSESSMENT & PLAN NOTE
Hx of CASTILLO cirrhosis, choriocarcinoma s/p liver transplant 2022 on tacrolimus. Last tacrolimus level 7.8 (01/26/24).    --continue home tacrolimus.  --daily CMP and tacrolimus levels  --liver transplant surgery consulted, appreciate recs.

## 2024-01-28 NOTE — SUBJECTIVE & OBJECTIVE
Past Medical History:   Diagnosis Date    Acute appendicitis 06/02/2023    Atrial fibrillation     Calculus of ureter 12/22/2023    Cholangiocarcinoma 03/16/2022    Cirrhosis 11/23/2020    Diabetes mellitus, type 2     Diabetic neuropathy 11/24/2020    Disorder of kidney and ureter     HTN (hypertension) 11/23/2020    Hyperlipidemia 11/23/2020    Kidney stones 11/23/2020    S/p lithotripsy 2020    Leukocytosis 01/15/2021    Liver mass 11/23/2020    CASTILLO (nonalcoholic steatohepatitis) 11/23/2020    Nausea and vomiting 08/21/2023    Other ascites 03/16/2022    PAD (peripheral artery disease)     Portal hypertension 11/23/2020    Skin cancer 11/23/2020       Past Surgical History:   Procedure Laterality Date    ANGIOGRAM, CORONARY, WITH LEFT HEART CATHETERIZATION N/A 12/26/2023    Procedure: Angiogram, Coronary, with Left Heart Cath;  Surgeon: Carlos King MD;  Location: [facility] CATH LAB;  Service: Cardiology;  Laterality: N/A;    COLONOSCOPY  10/2020    ESOPHAGEAL MANOMETRY WITH MEASUREMENT OF IMPEDANCE N/A 7/17/2023    Procedure: MANOMETRY, ESOPHAGUS, WITH IMPEDANCE MEASUREMENT;  Surgeon: Klarissa Zamora MD;  [facility]  Service: Gastroenterology;  Laterality: N/A;  pt diabetic  liver txp  prep insructions sent to pt via email and portal -    ESOPHAGOGASTRODUODENOSCOPY  10/2020    ESOPHAGOGASTRODUODENOSCOPY N/A 7/1/2021    Procedure: EGD (ESOPHAGOGASTRODUODENOSCOPY);  Surgeon: Jovan David MD;  [facility]  Service: Endoscopy;  Laterality: N/A;  HCC. Listed for liver transplant. EGD for variceal surveillance.  cardiac clearance and blood thinenr approval received, see telephone encounter 6/23/21-BB  fully vaccinated-BB  labs same day of procedure-BB    EXCISION OF HYDROCELE Right     hemorroid surgery      hemorroidectomy      KIDNEY STONE SURGERY      LAPAROSCOPIC APPENDECTOMY N/A 6/2/2023    Procedure: APPENDECTOMY, LAPAROSCOPIC;  Surgeon: Shan Ross MD;   Location: [facility] FLR;  Service: General;  Laterality: N/A;    LEFT HEART CATHETERIZATION N/A 1/27/2021    Procedure: Left heart cath;  Surgeon: Kris Shelton MD;  Location: [facility] CATH LAB;  Service: Cardiology;  Laterality: N/A;    liver mass removal      LIVER TRANSPLANT N/A 1/6/2022    Procedure: TRANSPLANT, LIVER;  Surgeon: Lizet Garcia MD;  Location: [facility];  Service: Transplant;  Laterality: N/A;    RIGHT HEART CATHETERIZATION Right 5/7/2021    Procedure: INSERTION, CATHETER, RIGHT HEART;  Surgeon: Jaden Schuster MD;  Location: [facility] CATH LAB;  Service: Cardiology;  Laterality: Right;    STENT, DRUG ELUTING, SINGLE VESSEL, CORONARY  12/26/2023    Procedure: Stent, Drug Eluting, Single Vessel, Coronary;  Surgeon: Carlos King MD;  Location: [facility] CATH LAB;  Service: Cardiology;;    TREATMENT OF CARDIAC ARRHYTHMIA N/A 5/19/2021    Procedure: CARDIOVERSION;  Surgeon: Didier Tilley MD;  Location: [facility] EP LAB;  Service: Cardiology;  Laterality: N/A;  a fib, dccv/johny, mac, dm. sscu    TREATMENT OF CARDIAC ARRHYTHMIA N/A 5/31/2021    Procedure: CARDIOVERSION;  Surgeon: Daniel Arambula MD;  Location: [facility] EP LAB;  Service: Cardiology;  Laterality: N/A;  afib, DCCV, anest., DM, 3 prep    TREATMENT OF CARDIAC ARRHYTHMIA N/A 7/7/2021    Procedure: Cardioversion or Defibrillation;  Surgeon: Didier Tilley MD;  Location: [facility] EP LAB;  Service: Cardiology;  Laterality: N/A;  AF, JOHNY (cancel if complaint), DCCV, MAC, DM, 3 Prep    TREATMENT OF CARDIAC ARRHYTHMIA N/A 7/27/2021    Procedure: Cardioversion or Defibrillation;  Surgeon: Didier Tilley MD;  Location: [facility] EP LAB;  Service: Cardiology;  Laterality: N/A;  AF, JOHNY (cx if complaint), DCCV, MAC, DM, 3 Prep       Review of patient's allergies indicates:   Allergen Reactions    Bee pollens Swelling     BEE STINGS swells body       Current Facility-Administered Medications on File Prior to Encounter   Medication    sodium chloride 0.9%  bolus 1,000 mL     Current Outpatient Medications on File Prior to Encounter   Medication Sig    apixaban (ELIQUIS) 5 mg Tab Take 1 tablet (5 mg total) by mouth 2 (two) times daily.    aspirin (ECOTRIN) 81 MG EC tablet Take 81 mg by mouth once daily.    atorvastatin (LIPITOR) 80 MG tablet Take 1 tablet (80 mg total) by mouth once daily.    blood sugar diagnostic Strp To check BG 2 times daily, to use with insurance preferred meter (patient has a [employer] self-monitoring glucose meter)    blood-glucose meter kit To check BG 2 times daily, to use with insurance preferred meter    buPROPion (WELLBUTRIN XL) 150 MG TB24 tablet Take 1 tablet (150 mg total) by mouth every morning.    calcium carbonate-vitamin D3 600 mg-20 mcg (800 unit) Tab Take 1 tablet by mouth once daily.    clopidogreL (PLAVIX) 75 mg tablet Take 1 tablet (75 mg total) by mouth once daily.    gabapentin (NEURONTIN) 300 MG capsule Take 1 capsule (300 mg total) by mouth 3 (three) times daily as needed (neuropathy).    insulin (LANTUS SOLOSTAR U-100 INSULIN) glargine 100 units/mL SubQ pen INJECT 22 UNITS INTO THE SKIN EVERY EVENING. ADJUST DOSE UNTIL AM GLUCOSE GOAL . MAX DOSE 30 UNITS/DY    insulin lispro (HUMALOG KWIKPEN INSULIN) 100 unit/mL pen Inject 6 Units into the skin 3 (three) times daily with meals. Plus sliding scale, MDD: 48 units    lancets Misc To check BG 2 times daily, to use with insurance preferred meter    magnesium oxide (MAG-OX) 400 mg (241.3 mg magnesium) tablet Take 2 tablets (800 mg total) by mouth 3 (three) times daily.    metoprolol succinate (TOPROL-XL) 25 MG 24 hr tablet Take 1 tablet (25 mg total) by mouth once daily.    midodrine (PROAMATINE) 10 MG tablet Take 0.5 tablets (5 mg total) by mouth 3 (three) times daily with meals.    multivitamin capsule Take 1 capsule by mouth once daily.    omega-3 fatty acids/fish oil (FISH OIL-OMEGA-3 FATTY ACIDS) 300-1,000 mg capsule Take 1 capsule by mouth once daily.     ondansetron  "(ZOFRAN-ODT) 4 MG TbDL Dissolve 1 tablet (4 mg total) by mouth every 8 (eight) hours as needed (nausea).    pantoprazole (PROTONIX) 40 MG tablet Take 1 tablet (40 mg total) by mouth once daily.    pen needle, diabetic (BD ULTRA-FINE GÉNESIS PEN NEEDLE) 32 gauge x 5/32" Ndle Use to inject insulin 4 times daily    QUEtiapine (SEROQUEL) 100 MG Tab Take 1 tablet (100 mg total) by mouth every evening.    tacrolimus (PROGRAF) 0.5 MG Cap Take 1 capsule (0.5 mg total) by mouth every 12 (twelve) hours.     Family History       Problem Relation (Age of Onset)    Coronary artery disease Father          Tobacco Use    Smoking status: Former     Current packs/day: 0.00     Types: Cigarettes     Quit date: 1980     Years since quittin.0    Smokeless tobacco: Former   Substance and Sexual Activity    Alcohol use: Not Currently    Drug use: Never    Sexual activity: Not on file     Review of Systems   Constitutional:  Negative for chills and fever.   HENT:  Negative for congestion, rhinorrhea, sore throat and trouble swallowing.    Eyes:  Negative for visual disturbance.   Respiratory:  Positive for chest tightness and shortness of breath. Negative for cough, choking and wheezing.    Cardiovascular:  Negative for chest pain, palpitations and leg swelling.   Gastrointestinal:  Negative for abdominal pain, constipation, diarrhea, nausea and vomiting.   Genitourinary:  Negative for decreased urine volume, difficulty urinating, dysuria, frequency and urgency.   Musculoskeletal:  Negative for arthralgias, back pain and myalgias.   Skin:  Negative for rash and wound.   Neurological:  Negative for dizziness, weakness, light-headedness and headaches.     Objective:     Vital Signs (Most Recent):  Temp: 98 °F (36.7 °C) (24)  Pulse: 79 (24)  Resp: 20 (24)  BP: 130/67 (24)  SpO2: 96 % (24) Vital Signs (24h Range):  Temp:  [98 °F (36.7 °C)-98.4 °F (36.9 °C)] 98 °F (36.7 °C)  Pulse:  " [76-79] 79  Resp:  [18-20] 20  SpO2:  [87 %-96 %] 96 %  BP: (130-147)/(64-78) 130/67     Weight: 93.9 kg (207 lb)  Body mass index is 28.07 kg/m².     Physical Exam  Vitals reviewed.   Constitutional:       General: He is not in acute distress.     Appearance: Normal appearance. He is not ill-appearing or toxic-appearing.      Comments: HOB elevated.   HENT:      Head: Normocephalic and atraumatic.      Nose: Nose normal.      Mouth/Throat:      Mouth: Mucous membranes are moist.   Eyes:      General: No scleral icterus.     Extraocular Movements: Extraocular movements intact.      Conjunctiva/sclera: Conjunctivae normal.      Pupils: Pupils are equal, round, and reactive to light.   Neck:      Comments: No noted JVD  Cardiovascular:      Rate and Rhythm: Normal rate and regular rhythm.      Pulses: Normal pulses.      Heart sounds: Murmur (3/6 systolic murmur) heard.   Pulmonary:      Effort: Pulmonary effort is normal. No respiratory distress.      Breath sounds: Rales present. No wheezing or rhonchi.      Comments: Breathing comfortably on 2L NC; dyspneic on exertion.   Abdominal:      General: Abdomen is flat. Bowel sounds are normal. There is no distension.      Palpations: Abdomen is soft.      Tenderness: There is no abdominal tenderness. There is no guarding or rebound.   Musculoskeletal:         General: No swelling. Normal range of motion.      Right lower leg: No edema.      Left lower leg: No edema.   Skin:     General: Skin is warm and dry.      Capillary Refill: Capillary refill takes less than 2 seconds.   Neurological:      General: No focal deficit present.      Mental Status: He is alert and oriented to person, place, and time.      Cranial Nerves: No cranial nerve deficit.      Motor: No weakness.   Psychiatric:         Mood and Affect: Mood normal.         Behavior: Behavior normal.         Thought Content: Thought content normal.         Judgment: Judgment normal.              CRANIAL NERVES      CN III, IV, VI   Pupils are equal, round, and reactive to light.       Significant Labs: All pertinent labs within the past 24 hours have been reviewed.  CBC:   Recent Labs   Lab 01/26/24  0427 01/27/24  1544   WBC 8.78 10.28   HGB 8.7* 9.1*   HCT 32.2* 32.7*    180     CMP:   Recent Labs   Lab 01/26/24  0427 01/27/24  1544    137   K 3.9 4.4    100   CO2 27 29   * 233*   BUN 25* 37*   CREATININE 1.2 1.5*   CALCIUM 8.9 9.3   PROT 6.7 7.1   ALBUMIN 3.4* 3.5   BILITOT 1.1* 1.5*   ALKPHOS 131 127   AST 21 22   ALT 22 19   ANIONGAP 8 8     Coagulation:   Recent Labs   Lab 01/27/24  1544   INR 1.4*     Troponin:   Recent Labs   Lab 01/27/24  1544   TROPONINI 0.022       Significant Imaging: I have reviewed all pertinent imaging results/findings within the past 24 hours.

## 2024-01-28 NOTE — CONSULTS
Food & Nutrition  Education    Diet Education: Low sodium, Fluid restriction    Time Spent: 3 minutes   Learners: Pt     Nutrition Education provided with handouts:   Handouts discussed the importance of a low sodium diet. Reviewed high sodium foods that should be avoided. Food labels, salt free seasonings, and recommended sodium intake reviewed. Encouraged healthy, fresh foods that are low in sodium that are good for consumption.General healthy diet encouraged. All questions/concerns were addressed    All questions and concerns answered. Dietitian's contact information provided.     Follow-Up: Yes     Please Re-consult as needed        Thanks!

## 2024-01-28 NOTE — ASSESSMENT & PLAN NOTE
68 year old male with hx of HFrEF (LVEF 20-25%), persistent atrial fibrillation refractory to multiple cardioversions who presents with recurrent shortness of breath and chest tightness similar to his presentation few days prior. Recently discharged after improvement of symptoms s/p ASHISH + successful cardioversion with Interventional Cardiology on 01/26/24. Hypoxic in the ED with SpO2 87% on RA, improved with 2L NC, otherwise HDS. EKG w NSR, intermittent PACs. CXR c/w increased pulmonary venous congestion, pulmonary edema likely related to CHF.     Admitted to  for acute hypoxic respiratory failure in the setting of acute on chronic heart failure exacerbation due to volume overload.     --continue IV diuresis with furosemide 40 BID.  --wean oxygen as tolerated.  --strict I's/O's, daily weight  --fluid restriction 1.5L/day   --tele, daily labs.  --will benefit with transition to oral diuretic upon discharge.

## 2024-01-28 NOTE — HPI
Tej Purdy is a 68 year old man with hx of CASTILLO cirrhosis, cholangiocarcinoma s/p liver transplant in 2022 on tacrolimus, type 2 diabetes mellitus on insulin, hypertension, coronary artery disease s/p LHC +PCI (12/23), severe aortic stenosis, heart failure reduced ejection fraction with LVEF of 20-25%, persistent atrial fibrillation s/p several cardioversions on apixaban, peripheral arterial disease who presents with recurrent shortness of breath and chest tightness. Recently admitted to  on 01/25 for acute hypoxic respiratory failure in the setting of acute on chronic heart failure exacerbation likely due to recurrent atrial fibrillation for which he is now s/p ASHISH + successful cardioversion with IC on 01/26. He was diuresed with furosemide 40 IV BID during this brief hospital admission with appropriate response and was discharged the evening of 01/26 after improvement in his symptoms.     While in the ED: patient found to be hypoxic w SpO2 87% on RA upon arrival in the ED with improvement after 2L NC, otherwise HDS. EKG without ST elevation. BNP >962. Trop negative. CXR with cardiomegaly with increased pulmonary venous pressure likely pulmonary edema/CHF, grossly unchanged from prior.     Admitted to  for acute hypoxic respiratory failure in the setting of acute on chronic congestive heart failure secondary to volume overload.

## 2024-01-28 NOTE — NURSING
Nurses Note -- 4 Eyes      1/28/2024   10:48 PM      Skin assessed during: Admit      [x] No Altered Skin Integrity Present    [x]Prevention Measures Documented      [] Yes- Altered Skin Integrity Present or Discovered   [] LDA Added if Not in Epic (Describe Wound)   [] New Altered Skin Integrity was Present on Admit and Documented in LDA   [] Wound Image Taken    Wound Care Consulted? No    Attending Nurse:  Leeann Parra RN/Staff Member:   NICHELLE Dinh

## 2024-01-29 ENCOUNTER — PATIENT OUTREACH (OUTPATIENT)
Dept: ADMINISTRATIVE | Facility: CLINIC | Age: 69
End: 2024-01-29
Payer: MEDICARE

## 2024-01-29 DIAGNOSIS — R06.02 SOB (SHORTNESS OF BREATH): Primary | ICD-10-CM

## 2024-01-29 LAB
ALBUMIN SERPL BCP-MCNC: 3.3 G/DL (ref 3.5–5.2)
ALP SERPL-CCNC: 120 U/L (ref 55–135)
ALT SERPL W/O P-5'-P-CCNC: 16 U/L (ref 10–44)
ANION GAP SERPL CALC-SCNC: 8 MMOL/L (ref 8–16)
ASCENDING AORTA: 3.4 CM
AST SERPL-CCNC: 19 U/L (ref 10–40)
AV INDEX (PROSTH): 1.13
AV MEAN GRADIENT: 1 MMHG
AV PEAK GRADIENT: 2 MMHG
AV VALVE AREA BY VELOCITY RATIO: 3.82 CM²
AV VALVE AREA: 4.76 CM²
AV VELOCITY RATIO: 0.9
BASOPHILS # BLD AUTO: 0.05 K/UL (ref 0–0.2)
BASOPHILS NFR BLD: 0.6 % (ref 0–1.9)
BILIRUB SERPL-MCNC: 1.3 MG/DL (ref 0.1–1)
BSA FOR ECHO PROCEDURE: 2.19 M2
BUN SERPL-MCNC: 36 MG/DL (ref 8–23)
CALCIUM SERPL-MCNC: 8.6 MG/DL (ref 8.7–10.5)
CHLORIDE SERPL-SCNC: 98 MMOL/L (ref 95–110)
CO2 SERPL-SCNC: 30 MMOL/L (ref 23–29)
CREAT SERPL-MCNC: 1.3 MG/DL (ref 0.5–1.4)
CV ECHO LV RWT: 0.32 CM
DIFFERENTIAL METHOD BLD: ABNORMAL
DOP CALC AO PEAK VEL: 0.63 M/S
DOP CALC AO VTI: 10.16 CM
DOP CALC LVOT AREA: 4.2 CM2
DOP CALC LVOT DIAMETER: 2.32 CM
DOP CALC LVOT PEAK VEL: 0.57 M/S
DOP CALC LVOT STROKE VOLUME: 48.38 CM3
DOP CALCLVOT PEAK VEL VTI: 11.45 CM
ECHO LV POSTERIOR WALL: 0.97 CM (ref 0.6–1.1)
EOSINOPHIL # BLD AUTO: 0.2 K/UL (ref 0–0.5)
EOSINOPHIL NFR BLD: 2.2 % (ref 0–8)
ERYTHROCYTE [DISTWIDTH] IN BLOOD BY AUTOMATED COUNT: 20.2 % (ref 11.5–14.5)
EST. GFR  (NO RACE VARIABLE): 59.1 ML/MIN/1.73 M^2
FRACTIONAL SHORTENING: 22 % (ref 28–44)
GLUCOSE SERPL-MCNC: 156 MG/DL (ref 70–110)
HCT VFR BLD AUTO: 32.6 % (ref 40–54)
HGB BLD-MCNC: 8.9 G/DL (ref 14–18)
IMM GRANULOCYTES # BLD AUTO: 0.04 K/UL (ref 0–0.04)
IMM GRANULOCYTES NFR BLD AUTO: 0.5 % (ref 0–0.5)
INTERVENTRICULAR SEPTUM: 1.02 CM (ref 0.6–1.1)
LA MAJOR: 5.95 CM
LA MINOR: 5.04 CM
LA WIDTH: 3.26 CM
LEFT ATRIUM SIZE: 4.12 CM
LEFT ATRIUM VOLUME INDEX MOD: 21.4 ML/M2
LEFT ATRIUM VOLUME INDEX: 28.7 ML/M2
LEFT ATRIUM VOLUME MOD: 46.36 CM3
LEFT ATRIUM VOLUME: 62.3 CM3
LEFT INTERNAL DIMENSION IN SYSTOLE: 4.74 CM (ref 2.1–4)
LEFT VENTRICLE DIASTOLIC VOLUME INDEX: 84.55 ML/M2
LEFT VENTRICLE DIASTOLIC VOLUME: 183.48 ML
LEFT VENTRICLE MASS INDEX: 115 G/M2
LEFT VENTRICLE SYSTOLIC VOLUME INDEX: 48.1 ML/M2
LEFT VENTRICLE SYSTOLIC VOLUME: 104.42 ML
LEFT VENTRICULAR INTERNAL DIMENSION IN DIASTOLE: 6.05 CM (ref 3.5–6)
LEFT VENTRICULAR MASS: 248.76 G
LV LATERAL E/E' RATIO: 9.88 M/S
LYMPHOCYTES # BLD AUTO: 1.2 K/UL (ref 1–4.8)
LYMPHOCYTES NFR BLD: 14.6 % (ref 18–48)
MAGNESIUM SERPL-MCNC: 1.5 MG/DL (ref 1.6–2.6)
MCH RBC QN AUTO: 19.3 PG (ref 27–31)
MCHC RBC AUTO-ENTMCNC: 27.3 G/DL (ref 32–36)
MCV RBC AUTO: 71 FL (ref 82–98)
MONOCYTES # BLD AUTO: 1 K/UL (ref 0.3–1)
MONOCYTES NFR BLD: 12.5 % (ref 4–15)
MV PEAK E VEL: 0.79 M/S
NEUTROPHILS # BLD AUTO: 5.6 K/UL (ref 1.8–7.7)
NEUTROPHILS NFR BLD: 69.6 % (ref 38–73)
NRBC BLD-RTO: 0 /100 WBC
PHOSPHATE SERPL-MCNC: 3.9 MG/DL (ref 2.7–4.5)
PISA TR MAX VEL: 2.05 M/S
PLATELET # BLD AUTO: 167 K/UL (ref 150–450)
PMV BLD AUTO: ABNORMAL FL (ref 9.2–12.9)
POCT GLUCOSE: 179 MG/DL (ref 70–110)
POCT GLUCOSE: 207 MG/DL (ref 70–110)
POCT GLUCOSE: 220 MG/DL (ref 70–110)
POTASSIUM SERPL-SCNC: 3.6 MMOL/L (ref 3.5–5.1)
PROT SERPL-MCNC: 6.8 G/DL (ref 6–8.4)
RA MAJOR: 4.99 CM
RA PRESSURE ESTIMATED: 3 MMHG
RA WIDTH: 3.24 CM
RBC # BLD AUTO: 4.61 M/UL (ref 4.6–6.2)
RIGHT VENTRICULAR END-DIASTOLIC DIMENSION: 4.35 CM
RV TB RVSP: 5 MMHG
RV TISSUE DOPPLER FREE WALL SYSTOLIC VELOCITY 1 (APICAL 4 CHAMBER VIEW): 21.48 CM/S
SINUS: 3.45 CM
SODIUM SERPL-SCNC: 136 MMOL/L (ref 136–145)
STJ: 2.51 CM
TACROLIMUS BLD-MCNC: 8.6 NG/ML (ref 5–15)
TDI LATERAL: 0.08 M/S
TR MAX PG: 17 MMHG
TRICUSPID ANNULAR PLANE SYSTOLIC EXCURSION: 1.86 CM
TV REST PULMONARY ARTERY PRESSURE: 20 MMHG
WBC # BLD AUTO: 8.02 K/UL (ref 3.9–12.7)
Z-SCORE OF LEFT VENTRICULAR DIMENSION IN END DIASTOLE: -1.71
Z-SCORE OF LEFT VENTRICULAR DIMENSION IN END SYSTOLE: 0.64

## 2024-01-29 PROCEDURE — 25000003 PHARM REV CODE 250

## 2024-01-29 PROCEDURE — 25000003 PHARM REV CODE 250: Performed by: STUDENT IN AN ORGANIZED HEALTH CARE EDUCATION/TRAINING PROGRAM

## 2024-01-29 PROCEDURE — 94761 N-INVAS EAR/PLS OXIMETRY MLT: CPT

## 2024-01-29 PROCEDURE — 25000242 PHARM REV CODE 250 ALT 637 W/ HCPCS: Performed by: STUDENT IN AN ORGANIZED HEALTH CARE EDUCATION/TRAINING PROGRAM

## 2024-01-29 PROCEDURE — 20600001 HC STEP DOWN PRIVATE ROOM

## 2024-01-29 PROCEDURE — 84100 ASSAY OF PHOSPHORUS: CPT

## 2024-01-29 PROCEDURE — 83735 ASSAY OF MAGNESIUM: CPT

## 2024-01-29 PROCEDURE — 80197 ASSAY OF TACROLIMUS: CPT

## 2024-01-29 PROCEDURE — 85025 COMPLETE CBC W/AUTO DIFF WBC: CPT

## 2024-01-29 PROCEDURE — 36415 COLL VENOUS BLD VENIPUNCTURE: CPT

## 2024-01-29 PROCEDURE — 80053 COMPREHEN METABOLIC PANEL: CPT

## 2024-01-29 PROCEDURE — 99232 SBSQ HOSP IP/OBS MODERATE 35: CPT | Mod: ,,, | Performed by: INTERNAL MEDICINE

## 2024-01-29 PROCEDURE — 63600175 PHARM REV CODE 636 W HCPCS

## 2024-01-29 RX ORDER — FUROSEMIDE 40 MG/1
40 TABLET ORAL DAILY
Status: DISCONTINUED | OUTPATIENT
Start: 2024-01-29 | End: 2024-01-29

## 2024-01-29 RX ORDER — LANOLIN ALCOHOL/MO/W.PET/CERES
400 CREAM (GRAM) TOPICAL ONCE
Status: COMPLETED | OUTPATIENT
Start: 2024-01-29 | End: 2024-01-29

## 2024-01-29 RX ORDER — FUROSEMIDE 40 MG/1
40 TABLET ORAL DAILY
Status: DISCONTINUED | OUTPATIENT
Start: 2024-01-30 | End: 2024-02-02 | Stop reason: HOSPADM

## 2024-01-29 RX ADMIN — EMPAGLIFLOZIN 10 MG: 10 TABLET, FILM COATED ORAL at 09:01

## 2024-01-29 RX ADMIN — INSULIN DETEMIR 10 UNITS: 100 INJECTION, SOLUTION SUBCUTANEOUS at 09:01

## 2024-01-29 RX ADMIN — SACUBITRIL AND VALSARTAN 1 TABLET: 24; 26 TABLET, FILM COATED ORAL at 08:01

## 2024-01-29 RX ADMIN — PANTOPRAZOLE SODIUM 40 MG: 40 TABLET, DELAYED RELEASE ORAL at 09:01

## 2024-01-29 RX ADMIN — ATORVASTATIN CALCIUM 80 MG: 40 TABLET, FILM COATED ORAL at 09:01

## 2024-01-29 RX ADMIN — METOPROLOL SUCCINATE 25 MG: 25 TABLET, EXTENDED RELEASE ORAL at 09:01

## 2024-01-29 RX ADMIN — FUROSEMIDE 40 MG: 40 TABLET ORAL at 09:01

## 2024-01-29 RX ADMIN — BUPROPION HYDROCHLORIDE 150 MG: 150 TABLET, EXTENDED RELEASE ORAL at 06:01

## 2024-01-29 RX ADMIN — INSULIN DETEMIR 10 UNITS: 100 INJECTION, SOLUTION SUBCUTANEOUS at 08:01

## 2024-01-29 RX ADMIN — Medication 400 MG: at 09:01

## 2024-01-29 RX ADMIN — TACROLIMUS 0.5 MG: 0.5 CAPSULE ORAL at 09:01

## 2024-01-29 RX ADMIN — APIXABAN 5 MG: 5 TABLET, FILM COATED ORAL at 08:01

## 2024-01-29 RX ADMIN — INSULIN ASPART 3 UNITS: 100 INJECTION, SOLUTION INTRAVENOUS; SUBCUTANEOUS at 09:01

## 2024-01-29 RX ADMIN — TACROLIMUS 0.5 MG: 0.5 CAPSULE ORAL at 05:01

## 2024-01-29 RX ADMIN — APIXABAN 5 MG: 5 TABLET, FILM COATED ORAL at 09:01

## 2024-01-29 RX ADMIN — CLOPIDOGREL BISULFATE 75 MG: 75 TABLET ORAL at 09:01

## 2024-01-29 RX ADMIN — INSULIN ASPART 3 UNITS: 100 INJECTION, SOLUTION INTRAVENOUS; SUBCUTANEOUS at 05:01

## 2024-01-29 NOTE — SUBJECTIVE & OBJECTIVE
Past Medical History:   Diagnosis Date    Acute appendicitis 06/02/2023    Atrial fibrillation     Calculus of ureter 12/22/2023    Cholangiocarcinoma 03/16/2022    Cirrhosis 11/23/2020    Diabetes mellitus, type 2     Diabetic neuropathy 11/24/2020    Disorder of kidney and ureter     HTN (hypertension) 11/23/2020    Hyperlipidemia 11/23/2020    Kidney stones 11/23/2020    S/p lithotripsy 2020    Leukocytosis 01/15/2021    Liver mass 11/23/2020    CASTILLO (nonalcoholic steatohepatitis) 11/23/2020    Nausea and vomiting 08/21/2023    Other ascites 03/16/2022    PAD (peripheral artery disease)     Portal hypertension 11/23/2020    Skin cancer 11/23/2020       Past Surgical History:   Procedure Laterality Date    ANGIOGRAM, CORONARY, WITH LEFT HEART CATHETERIZATION N/A 12/26/2023    Procedure: Angiogram, Coronary, with Left Heart Cath;  Surgeon: Carlos King MD;  Location: [facility] CATH LAB;  Service: Cardiology;  Laterality: N/A;    COLONOSCOPY  10/2020    ESOPHAGEAL MANOMETRY WITH MEASUREMENT OF IMPEDANCE N/A 7/17/2023    Procedure: MANOMETRY, ESOPHAGUS, WITH IMPEDANCE MEASUREMENT;  Surgeon: Klarissa Zamora MD;  [facility]  Service: Gastroenterology;  Laterality: N/A;  pt diabetic  liver txp  prep insructions sent to pt via email and portal -    ESOPHAGOGASTRODUODENOSCOPY  10/2020    ESOPHAGOGASTRODUODENOSCOPY N/A 7/1/2021    Procedure: EGD (ESOPHAGOGASTRODUODENOSCOPY);  Surgeon: Jovan David MD;  [facility]  Service: Endoscopy;  Laterality: N/A;  HCC. Listed for liver transplant. EGD for variceal surveillance.  cardiac clearance and blood thinenr approval received, see telephone encounter 6/23/21-BB  fully vaccinated-BB  labs same day of procedure-BB    EXCISION OF HYDROCELE Right     hemorroid surgery      hemorroidectomy      KIDNEY STONE SURGERY      LAPAROSCOPIC APPENDECTOMY N/A 6/2/2023    Procedure: APPENDECTOMY, LAPAROSCOPIC;  Surgeon: Shan Ross MD;   Location: [facility];  Service: General;  Laterality: N/A;    LEFT HEART CATHETERIZATION N/A 1/27/2021    Procedure: Left heart cath;  Surgeon: Kris Shelton MD;  Location: [facility] CATH LAB;  Service: Cardiology;  Laterality: N/A;    liver mass removal      LIVER TRANSPLANT N/A 1/6/2022    Procedure: TRANSPLANT, LIVER;  Surgeon: Lizet Garcia MD;  Location: [facility];  Service: Transplant;  Laterality: N/A;    RIGHT HEART CATHETERIZATION Right 5/7/2021    Procedure: INSERTION, CATHETER, RIGHT HEART;  Surgeon: Jaden Schuster MD;  Location: [facility] CATH LAB;  Service: Cardiology;  Laterality: Right;    STENT, DRUG ELUTING, SINGLE VESSEL, CORONARY  12/26/2023    Procedure: Stent, Drug Eluting, Single Vessel, Coronary;  Surgeon: Carlos King MD;  Location: [facility] CATH LAB;  Service: Cardiology;;    TREATMENT OF CARDIAC ARRHYTHMIA N/A 5/19/2021    Procedure: CARDIOVERSION;  Surgeon: Didier Tilley MD;  Location: [facility] EP LAB;  Service: Cardiology;  Laterality: N/A;  a fib, dccv/johny, mac, dm. sscu    TREATMENT OF CARDIAC ARRHYTHMIA N/A 5/31/2021    Procedure: CARDIOVERSION;  Surgeon: Daniel Arambula MD;  Location: [facility] EP LAB;  Service: Cardiology;  Laterality: N/A;  afib, DCCV, anest., DM, 3 prep    TREATMENT OF CARDIAC ARRHYTHMIA N/A 7/7/2021    Procedure: Cardioversion or Defibrillation;  Surgeon: Didier Tilley MD;  Location: [facility] EP LAB;  Service: Cardiology;  Laterality: N/A;  AF, JOHNY (cancel if complaint), DCCV, MAC, DM, 3 Prep    TREATMENT OF CARDIAC ARRHYTHMIA N/A 7/27/2021    Procedure: Cardioversion or Defibrillation;  Surgeon: Didier Tilley MD;  Location: [facility] EP LAB;  Service: Cardiology;  Laterality: N/A;  AF, JOHNY (cx if complaint), DCCV, MAC, DM, 3 Prep       Review of patient's allergies indicates:   Allergen Reactions    Bee pollens Swelling     BEE STINGS swells body       Current Facility-Administered Medications on File Prior to Encounter   Medication    sodium chloride 0.9%  bolus 1,000 mL     Current Outpatient Medications on File Prior to Encounter   Medication Sig    apixaban (ELIQUIS) 5 mg Tab Take 1 tablet (5 mg total) by mouth 2 (two) times daily.    aspirin (ECOTRIN) 81 MG EC tablet Take 81 mg by mouth once daily.    atorvastatin (LIPITOR) 80 MG tablet Take 1 tablet (80 mg total) by mouth once daily.    blood sugar diagnostic Strp To check BG 2 times daily, to use with insurance preferred meter (patient has a [employer] self-monitoring glucose meter)    blood-glucose meter kit To check BG 2 times daily, to use with insurance preferred meter    buPROPion (WELLBUTRIN XL) 150 MG TB24 tablet Take 1 tablet (150 mg total) by mouth every morning.    calcium carbonate-vitamin D3 600 mg-20 mcg (800 unit) Tab Take 1 tablet by mouth once daily.    clopidogreL (PLAVIX) 75 mg tablet Take 1 tablet (75 mg total) by mouth once daily.    gabapentin (NEURONTIN) 300 MG capsule Take 1 capsule (300 mg total) by mouth 3 (three) times daily as needed (neuropathy).    insulin (LANTUS SOLOSTAR U-100 INSULIN) glargine 100 units/mL SubQ pen INJECT 22 UNITS INTO THE SKIN EVERY EVENING. ADJUST DOSE UNTIL AM GLUCOSE GOAL . MAX DOSE 30 UNITS/DY    insulin lispro (HUMALOG KWIKPEN INSULIN) 100 unit/mL pen Inject 6 Units into the skin 3 (three) times daily with meals. Plus sliding scale, MDD: 48 units    lancets Misc To check BG 2 times daily, to use with insurance preferred meter    magnesium oxide (MAG-OX) 400 mg (241.3 mg magnesium) tablet Take 2 tablets (800 mg total) by mouth 3 (three) times daily.    metoprolol succinate (TOPROL-XL) 25 MG 24 hr tablet Take 1 tablet (25 mg total) by mouth once daily.    midodrine (PROAMATINE) 10 MG tablet Take 0.5 tablets (5 mg total) by mouth 3 (three) times daily with meals.    multivitamin capsule Take 1 capsule by mouth once daily.    omega-3 fatty acids/fish oil (FISH OIL-OMEGA-3 FATTY ACIDS) 300-1,000 mg capsule Take 1 capsule by mouth once daily.     ondansetron  "(ZOFRAN-ODT) 4 MG TbDL Dissolve 1 tablet (4 mg total) by mouth every 8 (eight) hours as needed (nausea).    pantoprazole (PROTONIX) 40 MG tablet Take 1 tablet (40 mg total) by mouth once daily.    pen needle, diabetic (BD ULTRA-FINE GÉNESIS PEN NEEDLE) 32 gauge x 5/32" Ndle Use to inject insulin 4 times daily    QUEtiapine (SEROQUEL) 100 MG Tab Take 1 tablet (100 mg total) by mouth every evening.    tacrolimus (PROGRAF) 0.5 MG Cap Take 1 capsule (0.5 mg total) by mouth every 12 (twelve) hours.     Family History       Problem Relation (Age of Onset)    Coronary artery disease Father          Tobacco Use    Smoking status: Former     Current packs/day: 0.00     Types: Cigarettes     Quit date: 1980     Years since quittin.0    Smokeless tobacco: Former   Substance and Sexual Activity    Alcohol use: Not Currently    Drug use: Never    Sexual activity: Not on file     Review of Systems   Cardiovascular:  Positive for dyspnea on exertion. Negative for chest pain, leg swelling, orthopnea and syncope.   Respiratory:  Positive for shortness of breath.    Musculoskeletal:  Negative for falls.     Objective:     Vital Signs (Most Recent):  Temp: 98.1 °F (36.7 °C) (24 1623)  Pulse: 93 (24 1623)  Resp: 18 (24 1623)  BP: 126/76 (24 1623)  SpO2: 95 % (24 1623) Vital Signs (24h Range):  Temp:  [97.6 °F (36.4 °C)-98.4 °F (36.9 °C)] 98.1 °F (36.7 °C)  Pulse:  [] 93  Resp:  [18-20] 18  SpO2:  [90 %-97 %] 95 %  BP: ()/(50-76) 126/76     Weight: 94.8 kg (209 lb)  Body mass index is 28.35 kg/m².    SpO2: 95 %         Intake/Output Summary (Last 24 hours) at 2024 1720  Last data filed at 2024 1222  Gross per 24 hour   Intake 698 ml   Output 2750 ml   Net -2052 ml       Lines/Drains/Airways       Peripheral Intravenous Line  Duration                  Peripheral IV - Single Lumen 24 1543 18 G Right Forearm 2 days                     Physical Exam  Constitutional:       " "Appearance: Normal appearance.   HENT:      Mouth/Throat:      Mouth: Mucous membranes are moist.   Eyes:      Extraocular Movements: Extraocular movements intact.   Cardiovascular:      Rate and Rhythm: Normal rate and regular rhythm.      Heart sounds: Murmur heard.      Harsh midsystolic murmur is present with a grade of 4/6 at the upper right sternal border radiating to the neck.   Pulmonary:      Effort: Pulmonary effort is normal.   Abdominal:      Palpations: Abdomen is soft.   Musculoskeletal:      Right lower leg: No edema.      Left lower leg: No edema.   Skin:     General: Skin is warm.   Neurological:      Mental Status: He is alert and oriented to person, place, and time.          Significant Labs: BMP:   Recent Labs   Lab 01/28/24  0522 01/29/24  0509   * 156*    136   K 3.7 3.6    98   CO2 28 30*   BUN 34* 36*   CREATININE 1.2 1.3   CALCIUM 8.8 8.6*   MG 1.6 1.5*   , CMP   Recent Labs   Lab 01/28/24  0522 01/29/24  0509    136   K 3.7 3.6    98   CO2 28 30*   * 156*   BUN 34* 36*   CREATININE 1.2 1.3   CALCIUM 8.8 8.6*   PROT 6.6 6.8   ALBUMIN 3.3* 3.3*   BILITOT 1.3* 1.3*   ALKPHOS 117 120   AST 18 19   ALT 15 16   ANIONGAP 7* 8   , CBC   Recent Labs   Lab 01/28/24  0522 01/29/24  0509   WBC 8.04 8.02   HGB 8.4* 8.9*   HCT 30.7* 32.6*    167   , INR No results for input(s): "INR", "PROTIME" in the last 48 hours., Lipid Panel No results for input(s): "CHOL", "HDL", "LDLCALC", "TRIG", "CHOLHDL" in the last 48 hours., and Troponin No results for input(s): "TROPONINI" in the last 48 hours.    Significant Imaging:   TTE 12/23/24    Left Ventricle: The left ventricle is normal in size. Mildly increased wall thickness. There is concentric remodeling. There is severely reduced systolic function with a visually estimated ejection fraction of 20 - 25%. All walls except for the basal septum are hypokinetic. When directly compared to images from 7/5/2022 there has " been a significant change in left ventricular systolic function.    LV Diastolic function: Unable to assess diastolic function due to E-A fusion. Average E/e' ratio is 11.    Right Ventricle: Normal right ventricular cavity size. Wall thickness is normal. Right ventricle wall motion  is normal. Systolic function is normal.    Left Atrium: Left atrium is severely dilated.    Aortic Valve: There is moderate to severe low gradient aortic stenosis. Aortic valve area by VTI is 0.97 cm². LVOT diameter is 2.4 cm. Aortic valve peak velocity is 3.24 m/s. Mean gradient is 28 mmHg. The dimensionless index is 0.21. There is no significant regurgitation.    Mitral Valve: There is mild to moderate regurgitation.    Tricuspid Valve: There is mild to moderate regurgitation.    Pulmonary Artery: The estimated pulmonary artery systolic pressure is 38 mm Hg.    IVC/SVC: Low central venous pressure.    Pericardium: There is a very small circumferential effusion; slightly more fluid is located laterally.

## 2024-01-29 NOTE — ASSESSMENT & PLAN NOTE
EF drop from 60% 7/22 to 20-25% 12/23. Coronary angiogram w/ OM1 CAD s/p PCI.  Concern etiology is now aortic stenosis.   Continue GDMT with BB, ARNI, SGLT2i for now.  Euvolemic on exam.  Would avoid midodrine as this significantly increases afterload and worsens cardiac output in setting of reduced EF and aortic stenosis.   Not candidate for ICD as needs 3 months of GDMT and assessment of AV first.  Daily weights, low salt diet, strict I/Os.

## 2024-01-29 NOTE — PLAN OF CARE
Problem: Adult Inpatient Plan of Care  Goal: Plan of Care Review  Outcome: Ongoing, Progressing     Problem: Adult Inpatient Plan of Care  Goal: Optimal Comfort and Wellbeing  Outcome: Ongoing, Progressing     Problem: Fall Injury Risk  Goal: Absence of Fall and Fall-Related Injury  Outcome: Ongoing, Progressing      Plan of care reviewed and acknowledged with patient. Had no major complaints that were not handled with PRN/scheduled medications. Bed in low, locked position. Call light within easy reach. Instructed to call for needs and assistance. Frequent checks made to maintain safety, comfort, positioning, bathroom needs, and pain.

## 2024-01-29 NOTE — PLAN OF CARE
01/29/24 1512   Readmission   Why were you hospitalized in the last 30 days? respiratory distress and chest tightness   Why were you readmitted? Alarmed about signs/symptoms   When you left the hospital how did you feel? so strange , i felt fine and  and no problem.   When you left the hospital where did you go? Home with Family   Did patient/caregiver refused recommended DC plan? No   Tell me about what happened between when you left the hospital and the day you returned. On Friday night i went to  bed and early Saturday morning I began having a hard time laying flat or on my side and had difficulty breathing and  called the on call line and was told to come in .   When did you start not feeling well? see above   Did you try to manage your symptoms your self? Yes   What did you do? took my medications and used the IS   Did you call anyone? Yes   Who did you call? On Call Nurse   Did you try to see or did see a doctor or nurse before you came? No   Why? see above   Did you have  a follow-up appointment on discharge? Yes   Did you go? No   Why?   (not scheduled as of yet)   Was this a planned readmission? No     See initial discharge planning assessment for notes.     Shu Ramos RN    [phone]

## 2024-01-29 NOTE — CONSULTS
[facility] - [facility]iology Stepdown  Cardiology  Consult Note    Patient Name: Tej Purdy  MRN: [id number]  Admission Date: 1/27/2024  Hospital Length of Stay: 2 days  Code Status: Full Code   Attending Provider: Fabio Akins MD   Consulting Provider: Ashu Singh MD  Primary Care Physician: Thais Maxwell MD  Principal Problem:Acute hypoxic respiratory failure    Patient information was obtained from patient and ER records.     Inpatient consult to Cardiology  Consult performed by: Ashu Singh MD  Consult ordered by: Stalin Garces MD  Reason for consult: SAMSON        Subjective:     Chief Complaint:  SAMSON     HPI:   Tej Purdy is a 68 year old man with hx of CASTILLO cirrhosis, cholangiocarcinoma s/p liver transplant in 2022 on tacrolimus, type 2 diabetes mellitus on insulin, hypertension, coronary artery disease s/p LHC +PCI (12/23), severe aortic stenosis, heart failure reduced ejection fraction with LVEF of 20-25%, persistent atrial fibrillation s/p several cardioversions on apixaban, peripheral arterial disease who presents with recurrent shortness of breath and chest tightness. Recently admitted to  on 01/25 for acute hypoxic respiratory failure in the setting of acute on chronic heart failure exacerbation likely due to recurrent atrial fibrillation for which he is now s/p ASHISH + successful cardioversion with IC on 01/26. He was diuresed with furosemide 40 IV BID during this brief hospital admission with appropriate response and was discharged the evening of 01/26 after improvement in his symptoms.      While in the ED: patient found to be hypoxic w SpO2 87% on RA upon arrival in the ED with improvement after 2L NC, otherwise HDS. EKG without ST elevation. BNP >962. Trop negative. CXR with cardiomegaly with increased pulmonary venous pressure likely pulmonary edema/CHF, grossly unchanged from prior.      Admitted to  for acute hypoxic respiratory failure in the setting of acute on chronic  congestive heart failure secondary to volume overload.     Cardiology consulted for heart failure, aortic stenosis, and optimization of medical therapy. He's been having SAMSON for 3-4 months now. PCI last month had no improvement in symptoms.     Past Medical History:   Diagnosis Date    Acute appendicitis 06/02/2023    Atrial fibrillation     Calculus of ureter 12/22/2023    Cholangiocarcinoma 03/16/2022    Cirrhosis 11/23/2020    Diabetes mellitus, type 2     Diabetic neuropathy 11/24/2020    Disorder of kidney and ureter     HTN (hypertension) 11/23/2020    Hyperlipidemia 11/23/2020    Kidney stones 11/23/2020    S/p lithotripsy 2020    Leukocytosis 01/15/2021    Liver mass 11/23/2020    CASTILLO (nonalcoholic steatohepatitis) 11/23/2020    Nausea and vomiting 08/21/2023    Other ascites 03/16/2022    PAD (peripheral artery disease)     Portal hypertension 11/23/2020    Skin cancer 11/23/2020       Past Surgical History:   Procedure Laterality Date    ANGIOGRAM, CORONARY, WITH LEFT HEART CATHETERIZATION N/A 12/26/2023    Procedure: Angiogram, Coronary, with Left Heart Cath;  Surgeon: Carlos King MD;  Location: [facility] CATH LAB;  Service: Cardiology;  Laterality: N/A;    COLONOSCOPY  10/2020    ESOPHAGEAL MANOMETRY WITH MEASUREMENT OF IMPEDANCE N/A 7/17/2023    Procedure: MANOMETRY, ESOPHAGUS, WITH IMPEDANCE MEASUREMENT;  Surgeon: Klarissa Zamora MD;  Location: [facility] ([address] [place] [address]);  Service: Gastroenterology;  Laterality: N/A;  pt diabetic  liver txp  prep insructions sent to pt via email and portal -    ESOPHAGOGASTRODUODENOSCOPY  10/2020    ESOPHAGOGASTRODUODENOSCOPY N/A 7/1/2021    Procedure: EGD (ESOPHAGOGASTRODUODENOSCOPY);  Surgeon: Jovan David MD;  [facility]  Service: Endoscopy;  Laterality: N/A;  HCC. Listed for liver transplant. EGD for variceal surveillance.  cardiac clearance and blood thinenr approval received, see telephone encounter 6/23/21-BB  fully vaccinated-BB  labs  same day of procedure-BB    EXCISION OF HYDROCELE Right     hemorroid surgery      hemorroidectomy      KIDNEY STONE SURGERY      LAPAROSCOPIC APPENDECTOMY N/A 6/2/2023    Procedure: APPENDECTOMY, LAPAROSCOPIC;  Surgeon: Shan Ross MD;  [facility];  Service: General;  Laterality: N/A;    LEFT HEART CATHETERIZATION N/A 1/27/2021    Procedure: Left heart cath;  Surgeon: Kris Shelton MD;  Location: [facility] CATH LAB;  Service: Cardiology;  Laterality: N/A;    liver mass removal      LIVER TRANSPLANT N/A 1/6/2022    Procedure: TRANSPLANT, LIVER;  Surgeon: Lizet Garcia MD;  Location: [facility] OR [facility];  Service: Transplant;  Laterality: N/A;    RIGHT HEART CATHETERIZATION Right 5/7/2021    Procedure: INSERTION, CATHETER, RIGHT HEART;  Surgeon: Jaden Schuster MD;  Location: [facility] CATH LAB;  Service: Cardiology;  Laterality: Right;    STENT, DRUG ELUTING, SINGLE VESSEL, CORONARY  12/26/2023    Procedure: Stent, Drug Eluting, Single Vessel, Coronary;  Surgeon: Carlos King MD;  Location: [facility] CATH LAB;  Service: Cardiology;;    TREATMENT OF CARDIAC ARRHYTHMIA N/A 5/19/2021    Procedure: CARDIOVERSION;  Surgeon: Didier Tilley MD;  Location: [facility] EP LAB;  Service: Cardiology;  Laterality: N/A;  a fib, dccv/johny, mac, dm. sscu    TREATMENT OF CARDIAC ARRHYTHMIA N/A 5/31/2021    Procedure: CARDIOVERSION;  Surgeon: Daniel Arambula MD;  Location: [facility] EP LAB;  Service: Cardiology;  Laterality: N/A;  afib, DCCV, anest., DM, 3 prep    TREATMENT OF CARDIAC ARRHYTHMIA N/A 7/7/2021    Procedure: Cardioversion or Defibrillation;  Surgeon: Didier Tilley MD;  Location: [facility] EP LAB;  Service: Cardiology;  Laterality: N/A;  AF, JOHNY (cancel if complaint), DCCV, MAC, DM, 3 Prep    TREATMENT OF CARDIAC ARRHYTHMIA N/A 7/27/2021    Procedure: Cardioversion or Defibrillation;  Surgeon: Didier Tilley MD;  Location: [facility] EP LAB;  Service: Cardiology;  Laterality: N/A;  AF, JOHNY (cx if complaint), DCCV, MAC,  DM, 3 Prep       Review of patient's allergies indicates:   Allergen Reactions    Bee pollens Swelling     BEE STINGS swells body       Current Facility-Administered Medications on File Prior to Encounter   Medication    sodium chloride 0.9% bolus 1,000 mL     Current Outpatient Medications on File Prior to Encounter   Medication Sig    apixaban (ELIQUIS) 5 mg Tab Take 1 tablet (5 mg total) by mouth 2 (two) times daily.    aspirin (ECOTRIN) 81 MG EC tablet Take 81 mg by mouth once daily.    atorvastatin (LIPITOR) 80 MG tablet Take 1 tablet (80 mg total) by mouth once daily.    blood sugar diagnostic Strp To check BG 2 times daily, to use with insurance preferred meter (patient has a [employer] self-monitoring glucose meter)    blood-glucose meter kit To check BG 2 times daily, to use with insurance preferred meter    buPROPion (WELLBUTRIN XL) 150 MG TB24 tablet Take 1 tablet (150 mg total) by mouth every morning.    calcium carbonate-vitamin D3 600 mg-20 mcg (800 unit) Tab Take 1 tablet by mouth once daily.    clopidogreL (PLAVIX) 75 mg tablet Take 1 tablet (75 mg total) by mouth once daily.    gabapentin (NEURONTIN) 300 MG capsule Take 1 capsule (300 mg total) by mouth 3 (three) times daily as needed (neuropathy).    insulin (LANTUS SOLOSTAR U-100 INSULIN) glargine 100 units/mL SubQ pen INJECT 22 UNITS INTO THE SKIN EVERY EVENING. ADJUST DOSE UNTIL AM GLUCOSE GOAL . MAX DOSE 30 UNITS/DY    insulin lispro (HUMALOG KWIKPEN INSULIN) 100 unit/mL pen Inject 6 Units into the skin 3 (three) times daily with meals. Plus sliding scale, MDD: 48 units    lancets Misc To check BG 2 times daily, to use with insurance preferred meter    magnesium oxide (MAG-OX) 400 mg (241.3 mg magnesium) tablet Take 2 tablets (800 mg total) by mouth 3 (three) times daily.    metoprolol succinate (TOPROL-XL) 25 MG 24 hr tablet Take 1 tablet (25 mg total) by mouth once daily.    midodrine (PROAMATINE) 10 MG tablet Take 0.5 tablets (5 mg  "total) by mouth 3 (three) times daily with meals.    multivitamin capsule Take 1 capsule by mouth once daily.    omega-3 fatty acids/fish oil (FISH OIL-OMEGA-3 FATTY ACIDS) 300-1,000 mg capsule Take 1 capsule by mouth once daily.     ondansetron (ZOFRAN-ODT) 4 MG TbDL Dissolve 1 tablet (4 mg total) by mouth every 8 (eight) hours as needed (nausea).    pantoprazole (PROTONIX) 40 MG tablet Take 1 tablet (40 mg total) by mouth once daily.    pen needle, diabetic (BD ULTRA-FINE GÉNESIS PEN NEEDLE) 32 gauge x 5/32" Ndle Use to inject insulin 4 times daily    QUEtiapine (SEROQUEL) 100 MG Tab Take 1 tablet (100 mg total) by mouth every evening.    tacrolimus (PROGRAF) 0.5 MG Cap Take 1 capsule (0.5 mg total) by mouth every 12 (twelve) hours.     Family History       Problem Relation (Age of Onset)    Coronary artery disease Father          Tobacco Use    Smoking status: Former     Current packs/day: 0.00     Types: Cigarettes     Quit date: 1980     Years since quittin.0    Smokeless tobacco: Former   Substance and Sexual Activity    Alcohol use: Not Currently    Drug use: Never    Sexual activity: Not on file     Review of Systems   Cardiovascular:  Positive for dyspnea on exertion. Negative for chest pain, leg swelling, orthopnea and syncope.   Respiratory:  Positive for shortness of breath.    Musculoskeletal:  Negative for falls.     Objective:     Vital Signs (Most Recent):  Temp: 98.1 °F (36.7 °C) (24 1623)  Pulse: 93 (24 1623)  Resp: 18 (24 1623)  BP: 126/76 (24 1623)  SpO2: 95 % (24 1623) Vital Signs (24h Range):  Temp:  [97.6 °F (36.4 °C)-98.4 °F (36.9 °C)] 98.1 °F (36.7 °C)  Pulse:  [] 93  Resp:  [18-20] 18  SpO2:  [90 %-97 %] 95 %  BP: ()/(50-76) 126/76     Weight: 94.8 kg (209 lb)  Body mass index is 28.35 kg/m².    SpO2: 95 %         Intake/Output Summary (Last 24 hours) at 2024 1720  Last data filed at 2024 1222  Gross per 24 hour   Intake 698 ml " "  Output 2750 ml   Net -2052 ml       Lines/Drains/Airways       Peripheral Intravenous Line  Duration                  Peripheral IV - Single Lumen 01/27/24 1543 18 G Right Forearm 2 days                     Physical Exam  Constitutional:       Appearance: Normal appearance.   HENT:      Mouth/Throat:      Mouth: Mucous membranes are moist.   Eyes:      Extraocular Movements: Extraocular movements intact.   Cardiovascular:      Rate and Rhythm: Normal rate and regular rhythm.      Heart sounds: Murmur heard.      Harsh midsystolic murmur is present with a grade of 4/6 at the upper right sternal border radiating to the neck.   Pulmonary:      Effort: Pulmonary effort is normal.   Abdominal:      Palpations: Abdomen is soft.   Musculoskeletal:      Right lower leg: No edema.      Left lower leg: No edema.   Skin:     General: Skin is warm.   Neurological:      Mental Status: He is alert and oriented to person, place, and time.          Significant Labs: BMP:   Recent Labs   Lab 01/28/24  0522 01/29/24  0509   * 156*    136   K 3.7 3.6    98   CO2 28 30*   BUN 34* 36*   CREATININE 1.2 1.3   CALCIUM 8.8 8.6*   MG 1.6 1.5*   , CMP   Recent Labs   Lab 01/28/24  0522 01/29/24  0509    136   K 3.7 3.6    98   CO2 28 30*   * 156*   BUN 34* 36*   CREATININE 1.2 1.3   CALCIUM 8.8 8.6*   PROT 6.6 6.8   ALBUMIN 3.3* 3.3*   BILITOT 1.3* 1.3*   ALKPHOS 117 120   AST 18 19   ALT 15 16   ANIONGAP 7* 8   , CBC   Recent Labs   Lab 01/28/24  0522 01/29/24  0509   WBC 8.04 8.02   HGB 8.4* 8.9*   HCT 30.7* 32.6*    167   , INR No results for input(s): "INR", "PROTIME" in the last 48 hours., Lipid Panel No results for input(s): "CHOL", "HDL", "LDLCALC", "TRIG", "CHOLHDL" in the last 48 hours., and Troponin No results for input(s): "TROPONINI" in the last 48 hours.    Significant Imaging:   TTE 12/23/24    Left Ventricle: The left ventricle is normal in size. Mildly increased wall thickness. " There is concentric remodeling. There is severely reduced systolic function with a visually estimated ejection fraction of 20 - 25%. All walls except for the basal septum are hypokinetic. When directly compared to images from 7/5/2022 there has been a significant change in left ventricular systolic function.    LV Diastolic function: Unable to assess diastolic function due to E-A fusion. Average E/e' ratio is 11.    Right Ventricle: Normal right ventricular cavity size. Wall thickness is normal. Right ventricle wall motion  is normal. Systolic function is normal.    Left Atrium: Left atrium is severely dilated.    Aortic Valve: There is moderate to severe low gradient aortic stenosis. Aortic valve area by VTI is 0.97 cm². LVOT diameter is 2.4 cm. Aortic valve peak velocity is 3.24 m/s. Mean gradient is 28 mmHg. The dimensionless index is 0.21. There is no significant regurgitation.    Mitral Valve: There is mild to moderate regurgitation.    Tricuspid Valve: There is mild to moderate regurgitation.    Pulmonary Artery: The estimated pulmonary artery systolic pressure is 38 mm Hg.    IVC/SVC: Low central venous pressure.    Pericardium: There is a very small circumferential effusion; slightly more fluid is located laterally.  Assessment and Plan:     Acute on chronic heart failure with reduced ejection fraction (HFrEF)  EF drop from 60% 7/22 to 20-25% 12/23. Coronary angiogram w/ OM1 CAD s/p PCI.  Concern etiology is now aortic stenosis.   Continue GDMT with BB, ARNI, SGLT2i for now.  Euvolemic on exam.  Would avoid midodrine as this significantly increases afterload and worsens cardiac output in setting of reduced EF and aortic stenosis.   Not candidate for ICD as needs 3 months of GDMT and assessment of AV first.  Daily weights, low salt diet, strict I/Os.     Aortic stenosis  Previously noted to be moderate in severity in setting of preserved EF in 2022.  Now looks like low-flow, low-gradient severe AS with EF now  20-25%. RCC looks fused/fixed.  Recent angiogram reviewed.   Concerned it's the aortic stenosis driving his symptoms and systolic dysfunction.   Recommend NPO at MN and low-dose  stress echo to elucidate aortic stenosis severity.   Will plan on contacting valve team based on results.     Persistent atrial fibrillation  Last EKG with NSR.   Continue Eliquis and Toprol.   Monitor on telemetry.    Coronary artery disease involving native coronary artery of native heart without angina pectoris  S/p OM1 PCI 12/23.   Continue Plavix, Toprol, and Atorvastatin.         VTE Risk Mitigation (From admission, onward)           Ordered     apixaban tablet 5 mg  2 times daily         01/27/24 1932     IP VTE HIGH RISK PATIENT  Once         01/27/24 1850     Place sequential compression device  Until discontinued         01/27/24 1850     Reason for No Pharmacological VTE Prophylaxis  Once        Question:  Reasons:  Answer:  Already adequately anticoagulated on oral Anticoagulants    01/27/24 1850                  Case to be formally staffed tomorrow with Dr Korina Talley MD.     Thank you for your consult. I will follow-up with patient. Please contact us if you have any additional questions.    Ashu Singh MD  Cardiology   [facility] - Cardiology Stepdown

## 2024-01-29 NOTE — ASSESSMENT & PLAN NOTE
Previously noted to be moderate in severity in setting of preserved EF in 2022.  Now looks like low-flow, low-gradient severe AS with EF now 20-25%. RCC looks fused/fixed.  Recent angiogram reviewed.   Concerned it's the aortic stenosis driving his symptoms and systolic dysfunction.   Recommend NPO at MN and low-dose  stress echo to elucidate aortic stenosis severity.   Will plan on contacting valve team based on results.

## 2024-01-29 NOTE — ASSESSMENT & PLAN NOTE
Last HbA1c 7.5 (01/26/24). Home insulin regimen includes glargine 22U nightly, lispro 6U TIDWM and SSI.     --insulin detemir 10U BID, LDSSI.   --diabetic diet  --POCT glucose checks TIDWM and qhs  --goal glucose 140-180      Antihyperglycemics (From admission, onward)    Start     Stop Route Frequency Ordered    01/28/24 1545  empagliflozin (Jardiance) tablet 10 mg        Question Answer Comment   Does this patient have a diagnosis of heart failure? Yes    Does this patient have type 1 diabetes or diabetic ketoacidosis? No    Does this patient have symptomatic hypotension? No    Is the patient NPO or pending major surgery in next 3 days or less? No        -- Oral Daily 01/28/24 1538    01/28/24 0815  insulin aspart U-100 pen 3 Units         -- SubQ 3 times daily with meals 01/28/24 0706    01/27/24 2100  insulin detemir U-100 (Levemir) pen 10 Units         -- SubQ 2 times daily 01/27/24 1937    01/27/24 2036  insulin aspart U-100 pen 0-5 Units         -- SubQ Before meals & nightly PRN 01/27/24 1937

## 2024-01-29 NOTE — ASSESSMENT & PLAN NOTE
Hx of HFrEF (LVEF 20-25%); not on GDMT, currently on midodrine. CXR c/w pulmonary edema, increased pulmonary venous congestion. Tolerated IV diuresis with furosemide 40 BID during recent admit. Not on lasix at home.     --will diurese with furosemide 40 IV BID.   --cardiac diet, fluid restriction at 1.5L/day  --strict I/Os and daily standing weights  --maintain on telemetry and daily EKGs, maintain Mag >2 & K+ >4.  --ambulate as tolerated with assistance.   --may benefit with transition to oral diuresis upon discharge;    --referral to heart failure transitional care clinic with discharge.  --see other details in HFrEF.   Started pt on Toprol XL, Jardiance, and entresto. Consult to cards for assistance w/ GDMT

## 2024-01-29 NOTE — ASSESSMENT & PLAN NOTE
Hx of HFrEF with LVEF 20-25%; not on GDMT other than metoprolol succinate 25 daily due to being on midodrine.    Baseline BNP:   Lab Results   Component Value Date     (H) 01/27/2024     (H) 01/25/2024     (H) 12/29/2023     (H) 12/22/2023    BNP 91 04/11/2022      Results for orders placed during the hospital encounter of 12/22/23    Echo    Interpretation Summary    Left Ventricle: The left ventricle is normal in size. Mildly increased wall thickness. There is concentric remodeling. There is severely reduced systolic function with a visually estimated ejection fraction of 20 - 25%. All walls except for the basal septum are hypokinetic. When directly compared to images from 7/5/2022 there has been a significant change in left ventricular systolic function.    LV Diastolic function: Unable to assess diastolic function due to E-A fusion. Average E/e' ratio is 11.    Right Ventricle: Normal right ventricular cavity size. Wall thickness is normal. Right ventricle wall motion  is normal. Systolic function is normal.    Left Atrium: Left atrium is severely dilated.    Aortic Valve: There is moderate to severe low gradient aortic stenosis. Aortic valve area by VTI is 0.97 cm². LVOT diameter is 2.4 cm. Aortic valve peak velocity is 3.24 m/s. Mean gradient is 28 mmHg. The dimensionless index is 0.21. There is no significant regurgitation.    Mitral Valve: There is mild to moderate regurgitation.    Tricuspid Valve: There is mild to moderate regurgitation.    Pulmonary Artery: The estimated pulmonary artery systolic pressure is 38 mm Hg.    IVC/SVC: Low central venous pressure.    Pericardium: There is a very small circumferential effusion; slightly more fluid is located laterally.

## 2024-01-29 NOTE — SUBJECTIVE & OBJECTIVE
Interval History: NAOEN. Pt Sating well 2L nasal canula. Still with dyspnea on exertion. Consult to cards on recs for GDMT    Review of Systems   Constitutional:  Negative for chills and fever.   HENT:  Negative for congestion, rhinorrhea, sore throat and trouble swallowing.    Eyes:  Negative for visual disturbance.   Respiratory:  Positive for chest tightness and shortness of breath. Negative for cough, choking and wheezing.    Cardiovascular:  Negative for chest pain, palpitations and leg swelling.   Gastrointestinal:  Negative for abdominal pain, constipation, diarrhea, nausea and vomiting.   Genitourinary:  Negative for decreased urine volume, difficulty urinating, dysuria, frequency and urgency.   Musculoskeletal:  Negative for arthralgias, back pain and myalgias.   Skin:  Negative for rash and wound.   Neurological:  Negative for dizziness, weakness, light-headedness and headaches.     Objective:     Vital Signs (Most Recent):  Temp: 97.9 °F (36.6 °C) (01/29/24 1144)  Pulse: 102 (01/29/24 1144)  Resp: 18 (01/29/24 1144)  BP: 120/76 (01/29/24 1144)  SpO2: 97 % (01/29/24 1144) Vital Signs (24h Range):  Temp:  [97.6 °F (36.4 °C)-98.4 °F (36.9 °C)] 97.9 °F (36.6 °C)  Pulse:  [] 102  Resp:  [18-20] 18  SpO2:  [90 %-97 %] 97 %  BP: ()/(50-76) 120/76     Weight: 94.8 kg (209 lb)  Body mass index is 28.35 kg/m².    Intake/Output Summary (Last 24 hours) at 1/29/2024 1334  Last data filed at 1/29/2024 1222  Gross per 24 hour   Intake 1170 ml   Output 3150 ml   Net -1980 ml         Physical Exam  Vitals reviewed.   Constitutional:       General: He is not in acute distress.     Appearance: Normal appearance. He is not ill-appearing or toxic-appearing.      Comments: HOB elevated.   HENT:      Head: Normocephalic and atraumatic.      Nose: Nose normal.      Mouth/Throat:      Mouth: Mucous membranes are moist.   Eyes:      General: No scleral icterus.     Extraocular Movements: Extraocular movements intact.       Conjunctiva/sclera: Conjunctivae normal.      Pupils: Pupils are equal, round, and reactive to light.   Neck:      Comments: No noted JVD  Cardiovascular:      Rate and Rhythm: Normal rate and regular rhythm.      Pulses: Normal pulses.      Heart sounds: Murmur (3/6 systolic murmur) heard.   Pulmonary:      Effort: Pulmonary effort is normal. No respiratory distress.      Breath sounds: Rales present. No wheezing or rhonchi.      Comments: Breathing comfortably on 2L NC; dyspneic on exertion.   Abdominal:      General: Abdomen is flat. Bowel sounds are normal. There is no distension.      Palpations: Abdomen is soft.      Tenderness: There is no abdominal tenderness. There is no guarding or rebound.   Musculoskeletal:         General: No swelling. Normal range of motion.      Right lower leg: No edema.      Left lower leg: No edema.   Skin:     General: Skin is warm and dry.      Capillary Refill: Capillary refill takes less than 2 seconds.   Neurological:      General: No focal deficit present.      Mental Status: He is alert and oriented to person, place, and time.      Cranial Nerves: No cranial nerve deficit.      Motor: No weakness.   Psychiatric:         Mood and Affect: Mood normal.         Behavior: Behavior normal.         Thought Content: Thought content normal.         Judgment: Judgment normal.             Significant Labs: All pertinent labs within the past 24 hours have been reviewed.  CBC:   Recent Labs   Lab 01/27/24  1544 01/28/24  0522 01/29/24  0509   WBC 10.28 8.04 8.02   HGB 9.1* 8.4* 8.9*   HCT 32.7* 30.7* 32.6*    156 167     CMP:   Recent Labs   Lab 01/27/24  1544 01/28/24  0522 01/29/24  0509    136 136   K 4.4 3.7 3.6    101 98   CO2 29 28 30*   * 186* 156*   BUN 37* 34* 36*   CREATININE 1.5* 1.2 1.3   CALCIUM 9.3 8.8 8.6*   PROT 7.1 6.6 6.8   ALBUMIN 3.5 3.3* 3.3*   BILITOT 1.5* 1.3* 1.3*   ALKPHOS 127 117 120   AST 22 18 19   ALT 19 15 16   ANIONGAP 8 7* 8        Significant Imaging: I have reviewed all pertinent imaging results/findings within the past 24 hours.

## 2024-01-29 NOTE — PROGRESS NOTES
Eric Bañuelos - Cardiology [facility] Medicine  Progress Note    Patient Name: Tej Purdy  MRN: [id number]  Patient Class: IP- Inpatient   Admission Date: 1/27/2024  Length of Stay: 2 days  Attending Physician: Fabio Akins MD  Primary Care Provider: Thais Maxwell MD        Subjective:     Principal Problem:Acute hypoxic respiratory failure        HPI:  Tej Purdy is a 68 year old man with hx of CASTILLO cirrhosis, cholangiocarcinoma s/p liver transplant in 2022 on tacrolimus, type 2 diabetes mellitus on insulin, hypertension, coronary artery disease s/p LHC +PCI (12/23), severe aortic stenosis, heart failure reduced ejection fraction with LVEF of 20-25%, persistent atrial fibrillation s/p several cardioversions on apixaban, peripheral arterial disease who presents with recurrent shortness of breath and chest tightness. Recently admitted to  on 01/25 for acute hypoxic respiratory failure in the setting of acute on chronic heart failure exacerbation likely due to recurrent atrial fibrillation for which he is now s/p ASHISH + successful cardioversion with IC on 01/26. He was diuresed with furosemide 40 IV BID during this brief hospital admission with appropriate response and was discharged the evening of 01/26 after improvement in his symptoms.     While in the ED: patient found to be hypoxic w SpO2 87% on RA upon arrival in the ED with improvement after 2L NC, otherwise HDS. EKG without ST elevation. BNP >962. Trop negative. CXR with cardiomegaly with increased pulmonary venous pressure likely pulmonary edema/CHF, grossly unchanged from prior.     Admitted to  for acute hypoxic respiratory failure in the setting of acute on chronic congestive heart failure secondary to volume overload.     Overview/Hospital Course:  No notes on file    Interval History: NAOEN. Pt Sating well 2L nasal canula. Still with dyspnea on exertion. Consult to cards on recs for GDMT    Review of Systems   Constitutional:   Negative for chills and fever.   HENT:  Negative for congestion, rhinorrhea, sore throat and trouble swallowing.    Eyes:  Negative for visual disturbance.   Respiratory:  Positive for chest tightness and shortness of breath. Negative for cough, choking and wheezing.    Cardiovascular:  Negative for chest pain, palpitations and leg swelling.   Gastrointestinal:  Negative for abdominal pain, constipation, diarrhea, nausea and vomiting.   Genitourinary:  Negative for decreased urine volume, difficulty urinating, dysuria, frequency and urgency.   Musculoskeletal:  Negative for arthralgias, back pain and myalgias.   Skin:  Negative for rash and wound.   Neurological:  Negative for dizziness, weakness, light-headedness and headaches.     Objective:     Vital Signs (Most Recent):  Temp: 97.9 °F (36.6 °C) (01/29/24 1144)  Pulse: 102 (01/29/24 1144)  Resp: 18 (01/29/24 1144)  BP: 120/76 (01/29/24 1144)  SpO2: 97 % (01/29/24 1144) Vital Signs (24h Range):  Temp:  [97.6 °F (36.4 °C)-98.4 °F (36.9 °C)] 97.9 °F (36.6 °C)  Pulse:  [] 102  Resp:  [18-20] 18  SpO2:  [90 %-97 %] 97 %  BP: ()/(50-76) 120/76     Weight: 94.8 kg (209 lb)  Body mass index is 28.35 kg/m².    Intake/Output Summary (Last 24 hours) at 1/29/2024 1334  Last data filed at 1/29/2024 1222  Gross per 24 hour   Intake 1170 ml   Output 3150 ml   Net -1980 ml         Physical Exam  Vitals reviewed.   Constitutional:       General: He is not in acute distress.     Appearance: Normal appearance. He is not ill-appearing or toxic-appearing.      Comments: HOB elevated.   HENT:      Head: Normocephalic and atraumatic.      Nose: Nose normal.      Mouth/Throat:      Mouth: Mucous membranes are moist.   Eyes:      General: No scleral icterus.     Extraocular Movements: Extraocular movements intact.      Conjunctiva/sclera: Conjunctivae normal.      Pupils: Pupils are equal, round, and reactive to light.   Neck:      Comments: No noted JVD  Cardiovascular:       Rate and Rhythm: Normal rate and regular rhythm.      Pulses: Normal pulses.      Heart sounds: Murmur (3/6 systolic murmur) heard.   Pulmonary:      Effort: Pulmonary effort is normal. No respiratory distress.      Breath sounds: Rales present. No wheezing or rhonchi.      Comments: Breathing comfortably on 2L NC; dyspneic on exertion.   Abdominal:      General: Abdomen is flat. Bowel sounds are normal. There is no distension.      Palpations: Abdomen is soft.      Tenderness: There is no abdominal tenderness. There is no guarding or rebound.   Musculoskeletal:         General: No swelling. Normal range of motion.      Right lower leg: No edema.      Left lower leg: No edema.   Skin:     General: Skin is warm and dry.      Capillary Refill: Capillary refill takes less than 2 seconds.   Neurological:      General: No focal deficit present.      Mental Status: He is alert and oriented to person, place, and time.      Cranial Nerves: No cranial nerve deficit.      Motor: No weakness.   Psychiatric:         Mood and Affect: Mood normal.         Behavior: Behavior normal.         Thought Content: Thought content normal.         Judgment: Judgment normal.             Significant Labs: All pertinent labs within the past 24 hours have been reviewed.  CBC:   Recent Labs   Lab 01/27/24  1544 01/28/24  0522 01/29/24  0509   WBC 10.28 8.04 8.02   HGB 9.1* 8.4* 8.9*   HCT 32.7* 30.7* 32.6*    156 167     CMP:   Recent Labs   Lab 01/27/24  1544 01/28/24  0522 01/29/24  0509    136 136   K 4.4 3.7 3.6    101 98   CO2 29 28 30*   * 186* 156*   BUN 37* 34* 36*   CREATININE 1.5* 1.2 1.3   CALCIUM 9.3 8.8 8.6*   PROT 7.1 6.6 6.8   ALBUMIN 3.5 3.3* 3.3*   BILITOT 1.5* 1.3* 1.3*   ALKPHOS 127 117 120   AST 22 18 19   ALT 19 15 16   ANIONGAP 8 7* 8       Significant Imaging: I have reviewed all pertinent imaging results/findings within the past 24 hours.    Assessment/Plan:      * Acute hypoxic respiratory  failure  68 year old male with hx of HFrEF (LVEF 20-25%), persistent atrial fibrillation refractory to multiple cardioversions who presents with recurrent shortness of breath and chest tightness similar to his presentation few days prior. Recently discharged after improvement of symptoms s/p ASHISH + successful cardioversion with Interventional Cardiology on 01/26/24. Hypoxic in the ED with SpO2 87% on RA, improved with 2L NC, otherwise HDS. EKG w NSR, intermittent PACs. CXR c/w increased pulmonary venous congestion, pulmonary edema likely related to CHF.     Admitted to  for acute hypoxic respiratory failure in the setting of acute on chronic heart failure exacerbation due to volume overload.     --continue IV diuresis with furosemide 40 BID.  --wean oxygen as tolerated.  --strict I's/O's, daily weight  --fluid restriction 1.5L/day   --tele, daily labs.  --will benefit with transition to oral diuretic upon discharge.    History of liver transplant  Hx of CASTILLO cirrhosis, choriocarcinoma s/p liver transplant 2022 on tacrolimus. Last tacrolimus level 7.8 (01/26/24).    --continue home tacrolimus.  --daily CMP and tacrolimus levels  --liver transplant surgery consulted, appreciate recs.     Acute on chronic heart failure with reduced ejection fraction (HFrEF)  Hx of HFrEF (LVEF 20-25%); not on GDMT, currently on midodrine. CXR c/w pulmonary edema, increased pulmonary venous congestion. Tolerated IV diuresis with furosemide 40 BID during recent admit. Not on lasix at home.     --will diurese with furosemide 40 IV BID.   --cardiac diet, fluid restriction at 1.5L/day  --strict I/Os and daily standing weights  --maintain on telemetry and daily EKGs, maintain Mag >2 & K+ >4.  --ambulate as tolerated with assistance.   --may benefit with transition to oral diuresis upon discharge;    --referral to heart failure transitional care clinic with discharge.  --see other details in HFrEF.   Started pt on Toprol XL, Jardiance, and  entresto. Consult to cards for assistance w/ GDMT      HFrEF (heart failure with reduced ejection fraction)  Hx of HFrEF with LVEF 20-25%; not on GDMT other than metoprolol succinate 25 daily due to being on midodrine.    Baseline BNP:   Lab Results   Component Value Date     (H) 01/27/2024     (H) 01/25/2024     (H) 12/29/2023     (H) 12/22/2023    BNP 91 04/11/2022      Results for orders placed during the hospital encounter of 12/22/23    Echo    Interpretation Summary    Left Ventricle: The left ventricle is normal in size. Mildly increased wall thickness. There is concentric remodeling. There is severely reduced systolic function with a visually estimated ejection fraction of 20 - 25%. All walls except for the basal septum are hypokinetic. When directly compared to images from 7/5/2022 there has been a significant change in left ventricular systolic function.    LV Diastolic function: Unable to assess diastolic function due to E-A fusion. Average E/e' ratio is 11.    Right Ventricle: Normal right ventricular cavity size. Wall thickness is normal. Right ventricle wall motion  is normal. Systolic function is normal.    Left Atrium: Left atrium is severely dilated.    Aortic Valve: There is moderate to severe low gradient aortic stenosis. Aortic valve area by VTI is 0.97 cm². LVOT diameter is 2.4 cm. Aortic valve peak velocity is 3.24 m/s. Mean gradient is 28 mmHg. The dimensionless index is 0.21. There is no significant regurgitation.    Mitral Valve: There is mild to moderate regurgitation.    Tricuspid Valve: There is mild to moderate regurgitation.    Pulmonary Artery: The estimated pulmonary artery systolic pressure is 38 mm Hg.    IVC/SVC: Low central venous pressure.    Pericardium: There is a very small circumferential effusion; slightly more fluid is located laterally.        Coronary artery disease involving native coronary artery of native heart without angina  pectoris  --continue home clopidogrel and high-intensity statin.  --continuous telemetry    Type 2 diabetes mellitus, with long-term current use of insulin  Last HbA1c 7.5 (01/26/24). Home insulin regimen includes glargine 22U nightly, lispro 6U TIDWM and SSI.     --insulin detemir 10U BID, LDSSI.   --diabetic diet  --POCT glucose checks TIDWM and qhs  --goal glucose 140-180      Antihyperglycemics (From admission, onward)      Start     Stop Route Frequency Ordered    01/28/24 1545  empagliflozin (Jardiance) tablet 10 mg        Question Answer Comment   Does this patient have a diagnosis of heart failure? Yes    Does this patient have type 1 diabetes or diabetic ketoacidosis? No    Does this patient have symptomatic hypotension? No    Is the patient NPO or pending major surgery in next 3 days or less? No        -- Oral Daily 01/28/24 1538    01/28/24 0815  insulin aspart U-100 pen 3 Units         -- SubQ 3 times daily with meals 01/28/24 0706    01/27/24 2100  insulin detemir U-100 (Levemir) pen 10 Units         -- SubQ 2 times daily 01/27/24 1937    01/27/24 2036  insulin aspart U-100 pen 0-5 Units         -- SubQ Before meals & nightly PRN 01/27/24 1937                Primary hypertension  Home meds for hypertension were reviewed and noted below.   Hypertension Medications               metoprolol succinate (TOPROL-XL) 25 MG 24 hr tablet Take 1 tablet (25 mg total) by mouth once daily.          --continue home antihypertensives    Hyperlipidemia  --continue home statin.      VTE Risk Mitigation (From admission, onward)           Ordered     apixaban tablet 5 mg  2 times daily         01/27/24 1932     IP VTE HIGH RISK PATIENT  Once         01/27/24 1850     Place sequential compression device  Until discontinued         01/27/24 1850     Reason for No Pharmacological VTE Prophylaxis  Once        Question:  Reasons:  Answer:  Already adequately anticoagulated on oral Anticoagulants    01/27/24 1850                     Discharge Planning   FELISHA: 1/29/2024     Code Status: Full Code   Is the patient medically ready for discharge?:     Reason for patient still in hospital (select all that apply): Treatment  Discharge Plan A: Home with family                  Stalin Garces MD  Department of Hospital Medicine   [facility] - Cardiology Stepdown

## 2024-01-29 NOTE — PLAN OF CARE
Eric Bañuelos - Cardiology Stepdown  Initial Discharge Assessment       Primary Care Provider: Thais Maxwell MD    Admission Diagnosis: SOB (shortness of breath) [R06.02]  Chest pain [R07.9]    Admission Date: 1/27/2024  Expected Discharge Date: 1/29/2024    Transition of Care Barriers: None    Payor: [email or web address] [facility] / Plan: [employer] / Product Type: Medicare Advantage /     Extended Emergency Contact Information  Primary Emergency Contact: Aniya Berkowitz  Mobile Phone: [phone]  Relation: Significant other    Discharge Plan A: Home with family  Discharge Plan B: Home      [id number] - [place], [place] - [address]  [address]  [place] [address]  Phone: [phone] Fax: [phone]    [facility]  [address]  [facility] [address]  Phone: [phone] Fax: [phone]    [facility] [address] [place] [address]  Phone: [phone] Fax: [phone]      Initial Assessment (most recent)       Adult Discharge Assessment - 01/29/24 1506          Discharge Assessment    Assessment Type Discharge Planning Assessment     Confirmed/corrected address, phone number and insurance Yes     Confirmed Demographics Correct on Facesheet     Source of Information patient     Communicated FELISHA with patient/caregiver Date not available/Unable to determine     Reason For Admission SOB     People in Home significant other     Facility Arrived From: home     Do you expect to return to your current living situation? Yes     Do you have help at home or someone to help you manage your care at home? Yes     Who are your caregiver(s) and their phone number(s)? Aniya Berkowitz (GEORGI) [phone]     Prior to hospitilization cognitive status: Alert/Oriented     Current cognitive status: Alert/Oriented     Walking or Climbing Stairs ambulation difficulty, requires equipment     Mobility Management cane with elbow attachment      Dressing/Bathing Difficulty yes     Dressing/Bathing bathing difficulty, requires equipment     Dressing/Bathing Management shower chair     Equipment Currently Used at Home cane, straight;shower chair     Readmission within 30 days? Yes     Patient currently being followed by outpatient case management? No     Do you currently have service(s) that help you manage your care at home? No     Do you take prescription medications? Yes     Do you have prescription coverage? Yes     Coverage PHN     Do you have any problems affording any of your prescribed medications? No     Is the patient taking medications as prescribed? yes     Who is going to help you get home at discharge? Aniya HAYDEN) [phone]     How do you get to doctors appointments? family or friend will provide     Are you on dialysis? No     Do you take coumadin? No   on eliquis    Discharge Plan A Home with family     Discharge Plan B Home     DME Needed Upon Discharge  none     Discharge Plan discussed with: Patient     Name(s) and Number(s) Aniya HAYDEN) [phone]     Transition of Care Barriers None        OTHER    Name(s) of People in Home Aniya HAYDEN) [phone]                        CM met with the patient at the bedside and discussed the discharge plan. Gave him the discharge booklet and placed contact numbers on the white board in the room. Patient alert and sitting up in bed.  Patient verified his name , , PCP, Insurance and Pharmacy . Stated he lives with Aniya HAYDEN) [phone] in a double story  and has 16 steps to point of entry . He is not on coumadin (on eliquis) nor is he a dialysis patient . DME's include:a straight cane with the elbow guard and a shower chair.  Stated he takes  medication as prescribed and has no problems getting  medication.  Stated he uses the pharmacy at the [facility] and wants his prescriptions called there.     Discharge Plan A and Plan B have been determined by review of  patient's clinical status, future medical and therapeutic needs, and coverage/benefits for post-acute care in coordination with multidisciplinary team members.      Shu Ramos RN         [phone]

## 2024-01-29 NOTE — HPI
Tej Purdy is a 68 year old man with hx of CASTILLO cirrhosis, cholangiocarcinoma s/p liver transplant in 2022 on tacrolimus, type 2 diabetes mellitus on insulin, hypertension, coronary artery disease s/p LHC +PCI (12/23), severe aortic stenosis, heart failure reduced ejection fraction with LVEF of 20-25%, persistent atrial fibrillation s/p several cardioversions on apixaban, peripheral arterial disease who presents with recurrent shortness of breath and chest tightness. Recently admitted to  on 01/25 for acute hypoxic respiratory failure in the setting of acute on chronic heart failure exacerbation likely due to recurrent atrial fibrillation for which he is now s/p ASHISH + successful cardioversion with IC on 01/26. He was diuresed with furosemide 40 IV BID during this brief hospital admission with appropriate response and was discharged the evening of 01/26 after improvement in his symptoms.      While in the ED: patient found to be hypoxic w SpO2 87% on RA upon arrival in the ED with improvement after 2L NC, otherwise HDS. EKG without ST elevation. BNP >962. Trop negative. CXR with cardiomegaly with increased pulmonary venous pressure likely pulmonary edema/CHF, grossly unchanged from prior.      Admitted to  for acute hypoxic respiratory failure in the setting of acute on chronic congestive heart failure secondary to volume overload.     Cardiology consulted for heart failure, aortic stenosis, and optimization of medical therapy. He's been having SAMSON for 3-4 months now. PCI last month had no improvement in symptoms.

## 2024-01-29 NOTE — PROGRESS NOTES
"Heart Failure Transitional [facility] Clinic([facility]) nurse navigator notified of HFTCC candidate in need of education and introduction to 4-6 week program.      PT aao x 3 while lying in bed. Introduced self to pt as HFTCC nurse navigator.     Patient given "Heart Failure Transitional Care Clinic Home Care Guide" which includes "Daily weight and symptom tracker".  Encouraged pt to review information.      Reviewed the following key points of HFTCC program with pt:              1.) Take your medications as directed. Call Ms Samm if any health Care Professional changes your Heart/Fluid medications.               2.) Weight yourself daily. Daily Dry Weights. Upon waking ,empty your bladder, weigh with as little clothes as possible before eating/drinking. Record weight in Symptom Tracker and compare these weights for fluid gain. Weight gain overnight of 3-4 lbs is Fluid, also a gain of 5 lbs in 3 days is Fluid as well. Call US!              3.) Follow low salt and limited fluid diet. Salt/Sodium Restriction 7655-6327 mg, see page 4. Sodium makes you hold onto Fluid. High Sodium Foods;Deli Meat/Cheese, Sausages/Hot Dogs, Fast Food/Restaurant Food, Anything in a box, bottle or can. Cook with Fresh or Frozen ingredients and use seasonings that are labeled NO SALT. Check Portion Sizes, Salt is reported for 1 portion. A can may contain 2-3 portions. Fluid Restriction 1.5 -2 Liters/Day, see page 6, measure all of your Fluid, the milk in your cereal, broth in your soup and ice cream because anything that melts at room temp is a liquid.              4.) Stop smoking and start exercising. Brisk walking is good, don't walk like you are going to the [employer] and DON"T WALK IN THE HEAT! Start low and increase as tolerated. Remember if you don't use it you lose it.              5.) Go to your appointments and call your team. Have your weight and BP/P ready when we call to do the Phone Check ins and call us if you have fluid gain or " questions  Pt reminded to follow Symptom tracker and to call at the onset of symptoms according to tracker.          Reviewed plan for follow up once discharged to include phone calls, in person and virtual visits to assist pt optimizing their heart failure medication regimen and encouraging healthy lifestyle modifications.  Reminded pt that program will assist them over the next 4-6 weeks and then patient will be transferred to long term care provider .  Reminded pt how to contact [facility] navigator via phone and or via [employer].     Pt instructed appointment will be printed on hospital discharge paperwork.     Pt also reminded RN will call 48-72 hours after discharge to check on them.     PT requests afternoon appointments.    PT can verbalize read back of some of the information given.  Encouraged pt to read over information often and contact team with any questions or concerns.

## 2024-01-30 LAB
ALBUMIN SERPL BCP-MCNC: 3.3 G/DL (ref 3.5–5.2)
ALP SERPL-CCNC: 125 U/L (ref 55–135)
ALT SERPL W/O P-5'-P-CCNC: 17 U/L (ref 10–44)
ANION GAP SERPL CALC-SCNC: 12 MMOL/L (ref 8–16)
ASCENDING AORTA: 3.6 CM
AST SERPL-CCNC: 23 U/L (ref 10–40)
AV INDEX (PROSTH): 0.14
AV MEAN GRADIENT: 42 MMHG
AV PEAK GRADIENT: 67 MMHG
AV VALVE AREA BY VELOCITY RATIO: 0.69 CM²
AV VALVE AREA: 0.52 CM²
AV VELOCITY RATIO: 0.18
BASOPHILS # BLD AUTO: 0.06 K/UL (ref 0–0.2)
BASOPHILS NFR BLD: 0.8 % (ref 0–1.9)
BILIRUB SERPL-MCNC: 0.9 MG/DL (ref 0.1–1)
BSA FOR ECHO PROCEDURE: 2.17 M2
BUN SERPL-MCNC: 29 MG/DL (ref 8–23)
CALCIUM SERPL-MCNC: 9.2 MG/DL (ref 8.7–10.5)
CHLORIDE SERPL-SCNC: 100 MMOL/L (ref 95–110)
CO2 SERPL-SCNC: 24 MMOL/L (ref 23–29)
CREAT SERPL-MCNC: 1.2 MG/DL (ref 0.5–1.4)
CV ECHO LV RWT: 0.29 CM
CV STRESS BASE HR: 96 BPM
DIASTOLIC BLOOD PRESSURE: 83 MMHG
DIFFERENTIAL METHOD BLD: ABNORMAL
DOP CALC AO PEAK VEL: 4.1 M/S
DOP CALC AO VTI: 81 CM
DOP CALC LVOT AREA: 3.8 CM2
DOP CALC LVOT DIAMETER: 2.2 CM
DOP CALC LVOT PEAK VEL: 0.74 M/S
DOP CALC LVOT STROKE VOLUME: 41.79 CM3
DOP CALCLVOT PEAK VEL VTI: 11 CM
E WAVE DECELERATION TIME: 177.83 MSEC
E/A RATIO: 1.94
E/E' RATIO: 9.14 M/S
ECHO LV POSTERIOR WALL: 0.84 CM (ref 0.6–1.1)
EOSINOPHIL # BLD AUTO: 0.2 K/UL (ref 0–0.5)
EOSINOPHIL NFR BLD: 2.3 % (ref 0–8)
ERYTHROCYTE [DISTWIDTH] IN BLOOD BY AUTOMATED COUNT: 20.2 % (ref 11.5–14.5)
EST. GFR  (NO RACE VARIABLE): >60 ML/MIN/1.73 M^2
FRACTIONAL SHORTENING: 14 % (ref 28–44)
GLUCOSE SERPL-MCNC: 155 MG/DL (ref 70–110)
HCT VFR BLD AUTO: 35.6 % (ref 40–54)
HGB BLD-MCNC: 9.6 G/DL (ref 14–18)
IMM GRANULOCYTES # BLD AUTO: 0.05 K/UL (ref 0–0.04)
IMM GRANULOCYTES NFR BLD AUTO: 0.6 % (ref 0–0.5)
INTERVENTRICULAR SEPTUM: 0.89 CM (ref 0.6–1.1)
LA MAJOR: 5.82 CM
LA MINOR: 5.4 CM
LA WIDTH: 5.43 CM
LEFT ATRIUM SIZE: 4.43 CM
LEFT ATRIUM VOLUME INDEX MOD: 48 ML/M2
LEFT ATRIUM VOLUME INDEX: 53.3 ML/M2
LEFT ATRIUM VOLUME MOD: 103.25 CM3
LEFT ATRIUM VOLUME: 114.55 CM3
LEFT INTERNAL DIMENSION IN SYSTOLE: 4.98 CM (ref 2.1–4)
LEFT VENTRICLE DIASTOLIC VOLUME INDEX: 66.3 ML/M2
LEFT VENTRICLE DIASTOLIC VOLUME: 142.54 ML
LEFT VENTRICLE MASS INDEX: 90 G/M2
LEFT VENTRICLE SYSTOLIC VOLUME INDEX: 54.4 ML/M2
LEFT VENTRICLE SYSTOLIC VOLUME: 116.98 ML
LEFT VENTRICULAR INTERNAL DIMENSION IN DIASTOLE: 5.8 CM (ref 3.5–6)
LEFT VENTRICULAR MASS: 193.5 G
LV LATERAL E/E' RATIO: 6.4 M/S
LV SEPTAL E/E' RATIO: 16 M/S
LYMPHOCYTES # BLD AUTO: 1.2 K/UL (ref 1–4.8)
LYMPHOCYTES NFR BLD: 15 % (ref 18–48)
MAGNESIUM SERPL-MCNC: 1.5 MG/DL (ref 1.6–2.6)
MCH RBC QN AUTO: 19.2 PG (ref 27–31)
MCHC RBC AUTO-ENTMCNC: 27 G/DL (ref 32–36)
MCV RBC AUTO: 71 FL (ref 82–98)
MONOCYTES # BLD AUTO: 1 K/UL (ref 0.3–1)
MONOCYTES NFR BLD: 12.4 % (ref 4–15)
MV A" WAVE DURATION": 7.99 MSEC
MV PEAK A VEL: 0.33 M/S
MV PEAK E VEL: 0.64 M/S
MV STENOSIS PRESSURE HALF TIME: 51.57 MS
MV VALVE AREA P 1/2 METHOD: 4.27 CM2
NEUTROPHILS # BLD AUTO: 5.4 K/UL (ref 1.8–7.7)
NEUTROPHILS NFR BLD: 68.9 % (ref 38–73)
NRBC BLD-RTO: 0 /100 WBC
OHS CV CPX 1 MINUTE RECOVERY HEART RATE: 113 BPM
OHS CV CPX 85 PERCENT MAX PREDICTED HEART RATE MALE: 128
OHS CV CPX MAX PREDICTED HEART RATE: 150
OHS CV CPX PATIENT IS FEMALE: 0
OHS CV CPX PATIENT IS MALE: 1
OHS CV CPX PEAK DIASTOLIC BLOOD PRESSURE: 76 MMHG
OHS CV CPX PEAK HEAR RATE: 111 BPM
OHS CV CPX PEAK RATE PRESSURE PRODUCT: ABNORMAL
OHS CV CPX PEAK SYSTOLIC BLOOD PRESSURE: 110 MMHG
OHS CV CPX PERCENT MAX PREDICTED HEART RATE ACHIEVED: 74
OHS CV CPX RATE PRESSURE PRODUCT PRESENTING: ABNORMAL
OHS CV INITIAL DOSE: 5 MCG/KG/MIN
OHS CV PEAK DOSE: 20 MCG/KG/MIN
PHOSPHATE SERPL-MCNC: 3.6 MG/DL (ref 2.7–4.5)
PLATELET # BLD AUTO: 174 K/UL (ref 150–450)
PMV BLD AUTO: ABNORMAL FL (ref 9.2–12.9)
POCT GLUCOSE: 177 MG/DL (ref 70–110)
POCT GLUCOSE: 193 MG/DL (ref 70–110)
POCT GLUCOSE: 196 MG/DL (ref 70–110)
POCT GLUCOSE: 211 MG/DL (ref 70–110)
POTASSIUM SERPL-SCNC: 3.8 MMOL/L (ref 3.5–5.1)
PROT SERPL-MCNC: 7 G/DL (ref 6–8.4)
PULM VEIN S/D RATIO: 0.94
PV PEAK D VEL: 0.48 M/S
PV PEAK S VEL: 0.45 M/S
RA MAJOR: 5.36 CM
RA PRESSURE ESTIMATED: 3 MMHG
RA WIDTH: 3.61 CM
RBC # BLD AUTO: 5.01 M/UL (ref 4.6–6.2)
RIGHT VENTRICULAR END-DIASTOLIC DIMENSION: 3.81 CM
SINUS: 3.37 CM
SODIUM SERPL-SCNC: 136 MMOL/L (ref 136–145)
STJ: 2.98 CM
STRESS ECHO POST EXERCISE DUR MIN: 30 MINUTES
SYSTOLIC BLOOD PRESSURE: 113 MMHG
TACROLIMUS BLD-MCNC: 8.1 NG/ML (ref 5–15)
TDI LATERAL: 0.1 M/S
TDI SEPTAL: 0.04 M/S
TDI: 0.07 M/S
TRICUSPID ANNULAR PLANE SYSTOLIC EXCURSION: 1.63 CM
WBC # BLD AUTO: 7.88 K/UL (ref 3.9–12.7)
Z-SCORE OF LEFT VENTRICULAR DIMENSION IN END DIASTOLE: -1.85
Z-SCORE OF LEFT VENTRICULAR DIMENSION IN END SYSTOLE: 1.25

## 2024-01-30 PROCEDURE — 80053 COMPREHEN METABOLIC PANEL: CPT

## 2024-01-30 PROCEDURE — 25000003 PHARM REV CODE 250

## 2024-01-30 PROCEDURE — 20600001 HC STEP DOWN PRIVATE ROOM

## 2024-01-30 PROCEDURE — 85025 COMPLETE CBC W/AUTO DIFF WBC: CPT

## 2024-01-30 PROCEDURE — 63600175 PHARM REV CODE 636 W HCPCS

## 2024-01-30 PROCEDURE — 80197 ASSAY OF TACROLIMUS: CPT

## 2024-01-30 PROCEDURE — 84100 ASSAY OF PHOSPHORUS: CPT

## 2024-01-30 PROCEDURE — 83735 ASSAY OF MAGNESIUM: CPT

## 2024-01-30 PROCEDURE — 36415 COLL VENOUS BLD VENIPUNCTURE: CPT

## 2024-01-30 PROCEDURE — 63600175 PHARM REV CODE 636 W HCPCS: Performed by: STUDENT IN AN ORGANIZED HEALTH CARE EDUCATION/TRAINING PROGRAM

## 2024-01-30 PROCEDURE — 99232 SBSQ HOSP IP/OBS MODERATE 35: CPT | Mod: ,,, | Performed by: INTERNAL MEDICINE

## 2024-01-30 PROCEDURE — 94761 N-INVAS EAR/PLS OXIMETRY MLT: CPT

## 2024-01-30 PROCEDURE — 99900035 HC TECH TIME PER 15 MIN (STAT)

## 2024-01-30 PROCEDURE — 25000242 PHARM REV CODE 250 ALT 637 W/ HCPCS: Performed by: STUDENT IN AN ORGANIZED HEALTH CARE EDUCATION/TRAINING PROGRAM

## 2024-01-30 PROCEDURE — 27000221 HC OXYGEN, UP TO 24 HOURS

## 2024-01-30 PROCEDURE — 25000003 PHARM REV CODE 250: Performed by: STUDENT IN AN ORGANIZED HEALTH CARE EDUCATION/TRAINING PROGRAM

## 2024-01-30 RX ORDER — TALC
6 POWDER (GRAM) TOPICAL NIGHTLY PRN
Status: DISCONTINUED | OUTPATIENT
Start: 2024-01-30 | End: 2024-02-02 | Stop reason: HOSPADM

## 2024-01-30 RX ORDER — INSULIN ASPART 100 [IU]/ML
5 INJECTION, SOLUTION INTRAVENOUS; SUBCUTANEOUS
Status: DISCONTINUED | OUTPATIENT
Start: 2024-01-30 | End: 2024-02-02 | Stop reason: HOSPADM

## 2024-01-30 RX ORDER — DOBUTAMINE HYDROCHLORIDE 400 MG/100ML
5 INJECTION INTRAVENOUS
Status: COMPLETED | OUTPATIENT
Start: 2024-01-30 | End: 2024-01-30

## 2024-01-30 RX ADMIN — INSULIN ASPART 5 UNITS: 100 INJECTION, SOLUTION INTRAVENOUS; SUBCUTANEOUS at 11:01

## 2024-01-30 RX ADMIN — TACROLIMUS 0.5 MG: 0.5 CAPSULE ORAL at 06:01

## 2024-01-30 RX ADMIN — TACROLIMUS 0.5 MG: 0.5 CAPSULE ORAL at 09:01

## 2024-01-30 RX ADMIN — APIXABAN 5 MG: 5 TABLET, FILM COATED ORAL at 09:01

## 2024-01-30 RX ADMIN — PANTOPRAZOLE SODIUM 40 MG: 40 TABLET, DELAYED RELEASE ORAL at 09:01

## 2024-01-30 RX ADMIN — ATORVASTATIN CALCIUM 80 MG: 40 TABLET, FILM COATED ORAL at 09:01

## 2024-01-30 RX ADMIN — INSULIN DETEMIR 10 UNITS: 100 INJECTION, SOLUTION SUBCUTANEOUS at 06:01

## 2024-01-30 RX ADMIN — CLOPIDOGREL BISULFATE 75 MG: 75 TABLET ORAL at 09:01

## 2024-01-30 RX ADMIN — BUPROPION HYDROCHLORIDE 150 MG: 150 TABLET, EXTENDED RELEASE ORAL at 07:01

## 2024-01-30 RX ADMIN — INSULIN DETEMIR 10 UNITS: 100 INJECTION, SOLUTION SUBCUTANEOUS at 09:01

## 2024-01-30 RX ADMIN — SACUBITRIL AND VALSARTAN 1 TABLET: 24; 26 TABLET, FILM COATED ORAL at 09:01

## 2024-01-30 RX ADMIN — METOPROLOL SUCCINATE 25 MG: 25 TABLET, EXTENDED RELEASE ORAL at 09:01

## 2024-01-30 RX ADMIN — DOBUTAMINE HYDROCHLORIDE 5 MCG/KG/MIN: 400 INJECTION INTRAVENOUS at 02:01

## 2024-01-30 RX ADMIN — EMPAGLIFLOZIN 10 MG: 10 TABLET, FILM COATED ORAL at 09:01

## 2024-01-30 RX ADMIN — FUROSEMIDE 40 MG: 40 TABLET ORAL at 09:01

## 2024-01-30 RX ADMIN — Medication 6 MG: at 09:01

## 2024-01-30 NOTE — SUBJECTIVE & OBJECTIVE
Interval History: NAEON. Pt to undergo  stress echo to identify severity of aortic stenosis. 6 minute walk test to take place afterwards.Continuing to add components of GDMT if BP allows.    Review of Systems   Constitutional:  Negative for chills and fever.   HENT:  Negative for congestion, rhinorrhea, sore throat and trouble swallowing.    Eyes:  Negative for visual disturbance.   Respiratory:  Positive for chest tightness and shortness of breath. Negative for cough, choking and wheezing.    Cardiovascular:  Negative for chest pain, palpitations and leg swelling.   Gastrointestinal:  Negative for abdominal pain, constipation, diarrhea, nausea and vomiting.   Genitourinary:  Negative for decreased urine volume, difficulty urinating, dysuria, frequency and urgency.   Musculoskeletal:  Negative for arthralgias, back pain and myalgias.   Skin:  Negative for rash and wound.   Neurological:  Negative for dizziness, weakness, light-headedness and headaches.     Objective:     Vital Signs (Most Recent):  Temp: 97.3 °F (36.3 °C) (01/30/24 1153)  Pulse: 93 (01/30/24 1153)  Resp: 20 (01/30/24 1153)  BP: (!) 94/54 (01/30/24 1153)  SpO2: (!) 92 % (01/30/24 1153) Vital Signs (24h Range):  Temp:  [97.3 °F (36.3 °C)-98.4 °F (36.9 °C)] 97.3 °F (36.3 °C)  Pulse:  [] 93  Resp:  [18-20] 20  SpO2:  [92 %-100 %] 92 %  BP: ()/(54-91) 94/54     Weight: 93.3 kg (205 lb 11 oz)  Body mass index is 27.9 kg/m².    Intake/Output Summary (Last 24 hours) at 1/30/2024 1225  Last data filed at 1/30/2024 0800  Gross per 24 hour   Intake 342 ml   Output 675 ml   Net -333 ml         Physical Exam  Vitals and nursing note reviewed.   Constitutional:       General: He is not in acute distress.  HENT:      Head: Normocephalic and atraumatic.   Eyes:      General: No scleral icterus.     Pupils: Pupils are equal, round, and reactive to light.   Cardiovascular:      Rate and Rhythm: Normal rate.      Pulses: Normal pulses.      Heart  sounds: Murmur (systolic) heard.   Pulmonary:      Effort: Pulmonary effort is normal. No respiratory distress.      Breath sounds: Rales present. No wheezing.   Abdominal:      General: Bowel sounds are normal. There is no distension.      Palpations: Abdomen is soft.      Tenderness: There is no abdominal tenderness. There is no guarding.   Musculoskeletal:      Cervical back: Neck supple.      Right lower leg: No edema.      Left lower leg: No edema.   Skin:     General: Skin is warm and dry.   Neurological:      General: No focal deficit present.      Mental Status: He is alert.             Significant Labs: All pertinent labs within the past 24 hours have been reviewed.  CBC:   Recent Labs   Lab 01/29/24  0509 01/30/24  0309   WBC 8.02 7.88   HGB 8.9* 9.6*   HCT 32.6* 35.6*    174     CMP:   Recent Labs   Lab 01/29/24  0509 01/30/24  0309    136   K 3.6 3.8   CL 98 100   CO2 30* 24   * 155*   BUN 36* 29*   CREATININE 1.3 1.2   CALCIUM 8.6* 9.2   PROT 6.8 7.0   ALBUMIN 3.3* 3.3*   BILITOT 1.3* 0.9   ALKPHOS 120 125   AST 19 23   ALT 16 17   ANIONGAP 8 12       Significant Imaging: I have reviewed all pertinent imaging results/findings within the past 24 hours.

## 2024-01-30 NOTE — SUBJECTIVE & OBJECTIVE
Interval History: No acute events overnight. Patient reports poor sleep and is unhappy about his NPO status. Reports improvement in his symptoms today compared to his re-presentation. Planning for dobutamine stress echo today.    Review of Systems   Constitutional: Negative for chills, diaphoresis and night sweats.   Cardiovascular:  Positive for dyspnea on exertion. Negative for chest pain, irregular heartbeat, leg swelling, near-syncope and palpitations.   Respiratory:  Positive for shortness of breath. Negative for cough and wheezing.    Skin:  Negative for color change and dry skin.   Musculoskeletal:  Negative for joint pain and joint swelling.   Gastrointestinal:  Positive for bloating. Negative for abdominal pain, diarrhea, nausea and vomiting.   Genitourinary:  Negative for dysuria.   Neurological:  Positive for weakness. Negative for dizziness, headaches and light-headedness.   All other systems reviewed and are negative.    Objective:     Vital Signs (Most Recent):  Temp: 98.4 °F (36.9 °C) (01/30/24 0809)  Pulse: 104 (01/30/24 0913)  Resp: 20 (01/30/24 0809)  BP: 125/69 (01/30/24 0913)  SpO2: 98 % (01/30/24 0809) Vital Signs (24h Range):  Temp:  [97.9 °F (36.6 °C)-98.4 °F (36.9 °C)] 98.4 °F (36.9 °C)  Pulse:  [] 104  Resp:  [18-20] 20  SpO2:  [95 %-100 %] 98 %  BP: ()/(59-91) 125/69     Weight: 93.3 kg (205 lb 11 oz)  Body mass index is 27.9 kg/m².     SpO2: 98 %         Intake/Output Summary (Last 24 hours) at 1/30/2024 1033  Last data filed at 1/30/2024 0554  Gross per 24 hour   Intake 444 ml   Output 675 ml   Net -231 ml       Lines/Drains/Airways       Peripheral Intravenous Line  Duration                  Peripheral IV - Single Lumen 01/27/24 1543 18 G Right Forearm 2 days                       Physical Exam  Constitutional:       Appearance: Normal appearance.   HENT:      Mouth/Throat:      Mouth: Mucous membranes are moist.   Eyes:      Extraocular Movements: Extraocular movements  intact.   Cardiovascular:      Rate and Rhythm: Normal rate and regular rhythm.      Heart sounds: Murmur heard.      Harsh midsystolic murmur is present with a grade of 4/6 at the upper right sternal border radiating to the neck.   Pulmonary:      Effort: Pulmonary effort is normal.   Abdominal:      Palpations: Abdomen is soft.   Musculoskeletal:      Right lower leg: No edema.      Left lower leg: No edema.   Skin:     General: Skin is warm.   Neurological:      Mental Status: He is alert and oriented to person, place, and time.            Significant Labs: All pertinent lab results from the last 24 hours have been reviewed.    Significant Imaging: All relevant imaging was reviewed

## 2024-01-30 NOTE — ASSESSMENT & PLAN NOTE
Hx of HFrEF (LVEF 20-25%); not on GDMT, currently on midodrine. CXR c/w pulmonary edema, increased pulmonary venous congestion. Tolerated IV diuresis with furosemide 40 BID during recent admit. Not on lasix at home.     --will diurese with furosemide 40 IV BID.   --cardiac diet, fluid restriction at 1.5L/day  --strict I/Os and daily standing weights  --maintain on telemetry and daily EKGs, maintain Mag >2 & K+ >4.  --ambulate as tolerated with assistance.   --may benefit with transition to oral diuresis upon discharge;    --referral to heart failure transitional care clinic with discharge.  --see other details in HFrEF.   Started pt on Toprol XL, Jardiance, and entresto. Consult to cards; appreciate recs

## 2024-01-30 NOTE — ASSESSMENT & PLAN NOTE
Last HbA1c 7.5 (01/26/24). Home insulin regimen includes glargine 22U nightly, lispro 6U TIDWM and SSI.     --insulin detemir 10U BID, LDSSI.   --diabetic diet  --POCT glucose checks TIDWM and qhs  --goal glucose 140-180      Antihyperglycemics (From admission, onward)    Start     Stop Route Frequency Ordered    01/30/24 1130  insulin aspart U-100 pen 5 Units         -- SubQ 3 times daily with meals 01/30/24 0808    01/28/24 1545  empagliflozin (Jardiance) tablet 10 mg        Question Answer Comment   Does this patient have a diagnosis of heart failure? Yes    Does this patient have type 1 diabetes or diabetic ketoacidosis? No    Does this patient have symptomatic hypotension? No    Is the patient NPO or pending major surgery in next 3 days or less? No        -- Oral Daily 01/28/24 1538    01/27/24 2100  insulin detemir U-100 (Levemir) pen 10 Units         -- SubQ 2 times daily 01/27/24 1937 01/27/24 2036  insulin aspart U-100 pen 0-5 Units         -- SubQ Before meals & nightly PRN 01/27/24 1937

## 2024-01-30 NOTE — PROGRESS NOTES
[facility]  Cardiology  Progress Note    Patient Name: Tej Purdy  MRN: [id number]  Admission Date: 1/27/2024  Hospital Length of Stay: 3 days  Code Status: Full Code   Attending Physician: Fabio Akins MD   Primary Care Physician: Thais Maxwell MD  Expected Discharge Date: 1/30/2024  Principal Problem:Acute hypoxic respiratory failure    Subjective:     Hospital Course:   No notes on file    Interval History: No acute events overnight. Patient reports poor sleep and is unhappy about his NPO status. Reports improvement in his symptoms today compared to his re-presentation. Planning for dobutamine stress echo today.    Review of Systems   Constitutional: Negative for chills, diaphoresis and night sweats.   Cardiovascular:  Positive for dyspnea on exertion. Negative for chest pain, irregular heartbeat, leg swelling, near-syncope and palpitations.   Respiratory:  Positive for shortness of breath. Negative for cough and wheezing.    Skin:  Negative for color change and dry skin.   Musculoskeletal:  Negative for joint pain and joint swelling.   Gastrointestinal:  Positive for bloating. Negative for abdominal pain, diarrhea, nausea and vomiting.   Genitourinary:  Negative for dysuria.   Neurological:  Positive for weakness. Negative for dizziness, headaches and light-headedness.   All other systems reviewed and are negative.    Objective:     Vital Signs (Most Recent):  Temp: 98.4 °F (36.9 °C) (01/30/24 0809)  Pulse: 104 (01/30/24 0913)  Resp: 20 (01/30/24 0809)  BP: 125/69 (01/30/24 0913)  SpO2: 98 % (01/30/24 0809) Vital Signs (24h Range):  Temp:  [97.9 °F (36.6 °C)-98.4 °F (36.9 °C)] 98.4 °F (36.9 °C)  Pulse:  [] 104  Resp:  [18-20] 20  SpO2:  [95 %-100 %] 98 %  BP: ()/(59-91) 125/69     Weight: 93.3 kg (205 lb 11 oz)  Body mass index is 27.9 kg/m².     SpO2: 98 %         Intake/Output Summary (Last 24 hours) at 1/30/2024 1033  Last data filed at 1/30/2024 0529  Gross per  24 hour   Intake 444 ml   Output 675 ml   Net -231 ml       Lines/Drains/Airways       Peripheral Intravenous Line  Duration                  Peripheral IV - Single Lumen 01/27/24 1543 18 G Right Forearm 2 days                       Physical Exam  Constitutional:       Appearance: Normal appearance.   HENT:      Mouth/Throat:      Mouth: Mucous membranes are moist.   Eyes:      Extraocular Movements: Extraocular movements intact.   Cardiovascular:      Rate and Rhythm: Normal rate and regular rhythm.      Heart sounds: Murmur heard.      Harsh midsystolic murmur is present with a grade of 4/6 at the upper right sternal border radiating to the neck.   Pulmonary:      Effort: Pulmonary effort is normal.   Abdominal:      Palpations: Abdomen is soft.   Musculoskeletal:      Right lower leg: No edema.      Left lower leg: No edema.   Skin:     General: Skin is warm.   Neurological:      Mental Status: He is alert and oriented to person, place, and time.            Significant Labs: All pertinent lab results from the last 24 hours have been reviewed.    Significant Imaging: All relevant imaging was reviewed    Assessment and Plan:       Acute on chronic heart failure with reduced ejection fraction (HFrEF)  EF drop from 60% 7/22 to 20-25% 12/23. Coronary angiogram w/ OM1 CAD s/p PCI.  Concern etiology is now aortic stenosis. Euvolemic on exam.    - Continue GDMT with BB, ARNI, SGLT2i.  - If BP tolerates on current GDMT, would recommend addition of MRA.  - Avoid midodrine as this significantly increases afterload and worsens cardiac output in setting of reduced EF and aortic stenosis.   - Not candidate for ICD as needs 3 months of GDMT and assessment of AV first.  - Daily weights, low salt diet, strict I/Os.     Aortic stenosis  Previously noted to be moderate in severity in setting of preserved EF in 2022.  Now looks like low-flow, low-gradient severe AS with EF now 20-25%. RCC looks fused/fixed.  Recent angiogram  reviewed. Concerned it's the aortic stenosis driving his symptoms and systolic dysfunction.    - Low-dose  stress echo to elucidate aortic stenosis severity today, will follow-up results  - If DSE is inconclusive, will order AV calcium score with CT.  - Will contact valve team based on results.    Persistent atrial fibrillation  Last EKG with NSR.   Continue Eliquis and Toprol.   Monitor on telemetry.    Coronary artery disease involving native coronary artery of native heart without angina pectoris  S/p OM1 PCI 12/23.   Continue Plavix, Toprol, and Atorvastatin.         VTE Risk Mitigation (From admission, onward)           Ordered     apixaban tablet 5 mg  2 times daily         01/27/24 1932     IP VTE HIGH RISK PATIENT  Once         01/27/24 1850     Place sequential compression device  Until discontinued         01/27/24 1850     Reason for No Pharmacological VTE Prophylaxis  Once        Question:  Reasons:  Answer:  Already adequately anticoagulated on oral Anticoagulants    01/27/24 1850                    Demarcus Stewart MD  Cardiology  [facility]

## 2024-01-30 NOTE — PROGRESS NOTES
[facility]  Progress Note    Patient Name: Tej Purdy  MRN: [id number]  Patient Class: IP- Inpatient   Admission Date: 1/27/2024  Length of Stay: 3 days  Attending Physician: Fabio Akins MD  Primary Care Provider: Thais Maxwell MD        Subjective:     Principal Problem:Acute hypoxic respiratory failure        HPI:  Tej Purdy is a 68 year old man with hx of CASTILLO cirrhosis, cholangiocarcinoma s/p liver transplant in 2022 on tacrolimus, type 2 diabetes mellitus on insulin, hypertension, coronary artery disease s/p LHC +PCI (12/23), severe aortic stenosis, heart failure reduced ejection fraction with LVEF of 20-25%, persistent atrial fibrillation s/p several cardioversions on apixaban, peripheral arterial disease who presents with recurrent shortness of breath and chest tightness. Recently admitted to  on 01/25 for acute hypoxic respiratory failure in the setting of acute on chronic heart failure exacerbation likely due to recurrent atrial fibrillation for which he is now s/p JOHNY + successful cardioversion with IC on 01/26. He was diuresed with furosemide 40 IV BID during this brief hospital admission with appropriate response and was discharged the evening of 01/26 after improvement in his symptoms.     While in the ED: patient found to be hypoxic w SpO2 87% on RA upon arrival in the ED with improvement after 2L NC, otherwise HDS. EKG without ST elevation. BNP >962. Trop negative. CXR with cardiomegaly with increased pulmonary venous pressure likely pulmonary edema/CHF, grossly unchanged from prior.     Admitted to  for acute hypoxic respiratory failure in the setting of acute on chronic congestive heart failure secondary to volume overload.     Overview/Hospital Course:   Pt readmitted for acute hypoxic respiratory failure due to acute on chronic heart failure.  Underwent johny/cardioversion on 1/26 with dramatic improvement in symptoms and resolution of  hypoxia.  Patient was discharged home that evening.  However, dyspnea and hypoxia returned and patient was readmitted.  Initiated heart failure pathway.  Started patient on Lasix 40 mg IV b.i.d..  Now appears euvolemic on exam with evidence of contraction alkalosis.  TTE with CVP of 3.  Shows probable severe aortic stenosis and LVEF of 20-25%.  Decreased Lasix dose to 40 mg p.o. daily.  Discontinued midodrine.  Will attempt to optimize GDMT for treatment of heart failure reduced ejection fraction.  Started empagliflozin and Entresto.  Continues on home metoprolol succinate.  Patient would likely benefit from cardiac rehab, aortic valve replacement, AICD, maybe cardiac resynchronization therapy.     Interval History: NAEON. Pt to undergo  stress echo to identify severity of aortic stenosis. 6 minute walk test to take place afterwards.Continuing to add components of GDMT if BP allows.    Review of Systems   Constitutional:  Negative for chills and fever.   HENT:  Negative for congestion, rhinorrhea, sore throat and trouble swallowing.    Eyes:  Negative for visual disturbance.   Respiratory:  Positive for chest tightness and shortness of breath. Negative for cough, choking and wheezing.    Cardiovascular:  Negative for chest pain, palpitations and leg swelling.   Gastrointestinal:  Negative for abdominal pain, constipation, diarrhea, nausea and vomiting.   Genitourinary:  Negative for decreased urine volume, difficulty urinating, dysuria, frequency and urgency.   Musculoskeletal:  Negative for arthralgias, back pain and myalgias.   Skin:  Negative for rash and wound.   Neurological:  Negative for dizziness, weakness, light-headedness and headaches.     Objective:     Vital Signs (Most Recent):  Temp: 97.3 °F (36.3 °C) (01/30/24 1153)  Pulse: 93 (01/30/24 1153)  Resp: 20 (01/30/24 1153)  BP: (!) 94/54 (01/30/24 1153)  SpO2: (!) 92 % (01/30/24 1153) Vital Signs (24h Range):  Temp:  [97.3 °F (36.3 °C)-98.4 °F (36.9  °C)] 97.3 °F (36.3 °C)  Pulse:  [] 93  Resp:  [18-20] 20  SpO2:  [92 %-100 %] 92 %  BP: ()/(54-91) 94/54     Weight: 93.3 kg (205 lb 11 oz)  Body mass index is 27.9 kg/m².    Intake/Output Summary (Last 24 hours) at 1/30/2024 1225  Last data filed at 1/30/2024 0800  Gross per 24 hour   Intake 342 ml   Output 675 ml   Net -333 ml         Physical Exam  Vitals and nursing note reviewed.   Constitutional:       General: He is not in acute distress.  HENT:      Head: Normocephalic and atraumatic.   Eyes:      General: No scleral icterus.     Pupils: Pupils are equal, round, and reactive to light.   Cardiovascular:      Rate and Rhythm: Normal rate.      Pulses: Normal pulses.      Heart sounds: Murmur (systolic) heard.   Pulmonary:      Effort: Pulmonary effort is normal. No respiratory distress.      Breath sounds: Rales present. No wheezing.   Abdominal:      General: Bowel sounds are normal. There is no distension.      Palpations: Abdomen is soft.      Tenderness: There is no abdominal tenderness. There is no guarding.   Musculoskeletal:      Cervical back: Neck supple.      Right lower leg: No edema.      Left lower leg: No edema.   Skin:     General: Skin is warm and dry.   Neurological:      General: No focal deficit present.      Mental Status: He is alert.             Significant Labs: All pertinent labs within the past 24 hours have been reviewed.  CBC:   Recent Labs   Lab 01/29/24  0509 01/30/24  0309   WBC 8.02 7.88   HGB 8.9* 9.6*   HCT 32.6* 35.6*    174     CMP:   Recent Labs   Lab 01/29/24  0509 01/30/24  0309    136   K 3.6 3.8   CL 98 100   CO2 30* 24   * 155*   BUN 36* 29*   CREATININE 1.3 1.2   CALCIUM 8.6* 9.2   PROT 6.8 7.0   ALBUMIN 3.3* 3.3*   BILITOT 1.3* 0.9   ALKPHOS 120 125   AST 19 23   ALT 16 17   ANIONGAP 8 12       Significant Imaging: I have reviewed all pertinent imaging results/findings within the past 24 hours.    Assessment/Plan:      * Acute hypoxic  respiratory failure  68 year old male with hx of HFrEF (LVEF 20-25%), persistent atrial fibrillation refractory to multiple cardioversions who presents with recurrent shortness of breath and chest tightness similar to his presentation few days prior. Recently discharged after improvement of symptoms s/p ASHISH + successful cardioversion with Interventional Cardiology on 01/26/24. Hypoxic in the ED with SpO2 87% on RA, improved with 2L NC, otherwise HDS. EKG w NSR, intermittent PACs. CXR c/w increased pulmonary venous congestion, pulmonary edema likely related to CHF.     Admitted to  for acute hypoxic respiratory failure in the setting of acute on chronic heart failure exacerbation due to volume overload.     --continue IV diuresis with furosemide 40 BID.  --wean oxygen as tolerated.  --strict I's/O's, daily weight  --fluid restriction 1.5L/day   --tele, daily labs.  --will benefit with transition to oral diuretic upon discharge.    History of liver transplant  Hx of CASTILLO cirrhosis, choriocarcinoma s/p liver transplant 2022 on tacrolimus. Last tacrolimus level 7.8 (01/26/24).    --continue home tacrolimus.  --daily CMP and tacrolimus levels  --liver transplant surgery consulted, appreciate recs.     Acute on chronic heart failure with reduced ejection fraction (HFrEF)  Hx of HFrEF (LVEF 20-25%); not on GDMT, currently on midodrine. CXR c/w pulmonary edema, increased pulmonary venous congestion. Tolerated IV diuresis with furosemide 40 BID during recent admit. Not on lasix at home.     --will diurese with furosemide 40 IV BID.   --cardiac diet, fluid restriction at 1.5L/day  --strict I/Os and daily standing weights  --maintain on telemetry and daily EKGs, maintain Mag >2 & K+ >4.  --ambulate as tolerated with assistance.   --may benefit with transition to oral diuresis upon discharge;    --referral to heart failure transitional care clinic with discharge.  --see other details in HFrEF.   Started pt on Toprol XL,  Jardiance, and entresto. Consult to cards; appreciate recs        HFrEF (heart failure with reduced ejection fraction)  Hx of HFrEF with LVEF 20-25%; not on GDMT other than metoprolol succinate 25 daily due to being on midodrine.    Baseline BNP:   Lab Results   Component Value Date     (H) 01/27/2024     (H) 01/25/2024     (H) 12/29/2023     (H) 12/22/2023    BNP 91 04/11/2022      Results for orders placed during the hospital encounter of 12/22/23    Echo    Interpretation Summary    Left Ventricle: The left ventricle is normal in size. Mildly increased wall thickness. There is concentric remodeling. There is severely reduced systolic function with a visually estimated ejection fraction of 20 - 25%. All walls except for the basal septum are hypokinetic. When directly compared to images from 7/5/2022 there has been a significant change in left ventricular systolic function.    LV Diastolic function: Unable to assess diastolic function due to E-A fusion. Average E/e' ratio is 11.    Right Ventricle: Normal right ventricular cavity size. Wall thickness is normal. Right ventricle wall motion  is normal. Systolic function is normal.    Left Atrium: Left atrium is severely dilated.    Aortic Valve: There is moderate to severe low gradient aortic stenosis. Aortic valve area by VTI is 0.97 cm². LVOT diameter is 2.4 cm. Aortic valve peak velocity is 3.24 m/s. Mean gradient is 28 mmHg. The dimensionless index is 0.21. There is no significant regurgitation.    Mitral Valve: There is mild to moderate regurgitation.    Tricuspid Valve: There is mild to moderate regurgitation.    Pulmonary Artery: The estimated pulmonary artery systolic pressure is 38 mm Hg.    IVC/SVC: Low central venous pressure.    Pericardium: There is a very small circumferential effusion; slightly more fluid is located laterally.        Coronary artery disease involving native coronary artery of native heart without angina  pectoris  --continue home clopidogrel and high-intensity statin.  --continuous telemetry    Type 2 diabetes mellitus, with long-term current use of insulin  Last HbA1c 7.5 (01/26/24). Home insulin regimen includes glargine 22U nightly, lispro 6U TIDWM and SSI.     --insulin detemir 10U BID, LDSSI.   --diabetic diet  --POCT glucose checks TIDWM and qhs  --goal glucose 140-180      Antihyperglycemics (From admission, onward)      Start     Stop Route Frequency Ordered    01/30/24 1130  insulin aspart U-100 pen 5 Units         -- SubQ 3 times daily with meals 01/30/24 0808    01/28/24 1545  empagliflozin (Jardiance) tablet 10 mg        Question Answer Comment   Does this patient have a diagnosis of heart failure? Yes    Does this patient have type 1 diabetes or diabetic ketoacidosis? No    Does this patient have symptomatic hypotension? No    Is the patient NPO or pending major surgery in next 3 days or less? No        -- Oral Daily 01/28/24 1538    01/27/24 2100  insulin detemir U-100 (Levemir) pen 10 Units         -- SubQ 2 times daily 01/27/24 1937    01/27/24 2036  insulin aspart U-100 pen 0-5 Units         -- SubQ Before meals & nightly PRN 01/27/24 1937                Primary hypertension  Home meds for hypertension were reviewed and noted below.   Hypertension Medications               metoprolol succinate (TOPROL-XL) 25 MG 24 hr tablet Take 1 tablet (25 mg total) by mouth once daily.          --continue home antihypertensives    Hyperlipidemia  --continue home statin.      VTE Risk Mitigation (From admission, onward)           Ordered     apixaban tablet 5 mg  2 times daily         01/27/24 1932     IP VTE HIGH RISK PATIENT  Once         01/27/24 1850     Place sequential compression device  Until discontinued         01/27/24 1850     Reason for No Pharmacological VTE Prophylaxis  Once        Question:  Reasons:  Answer:  Already adequately anticoagulated on oral Anticoagulants    01/27/24 1850                     Discharge Planning   FELISHA: 1/30/2024     Code Status: Full Code   Is the patient medically ready for discharge?:     Reason for patient still in hospital (select all that apply): Treatment  Discharge Plan A: Home with family                  Stalin Garces MD  Department of Hospital Medicine   [facility] - Cardiology Stepdown

## 2024-01-30 NOTE — ASSESSMENT & PLAN NOTE
Previously noted to be moderate in severity in setting of preserved EF in 2022.  Now looks like low-flow, low-gradient severe AS with EF now 20-25%. RCC looks fused/fixed.  Recent angiogram reviewed. Concerned it's the aortic stenosis driving his symptoms and systolic dysfunction.    - Low-dose  stress echo to elucidate aortic stenosis severity today, will follow-up results  - If DSE is inconclusive, will order AV calcium score with CT.  - Will contact valve team based on results.

## 2024-01-30 NOTE — ASSESSMENT & PLAN NOTE
EF drop from 60% 7/22 to 20-25% 12/23. Coronary angiogram w/ OM1 CAD s/p PCI.  Concern etiology is now aortic stenosis. Euvolemic on exam.    - Continue GDMT with BB, ARNI, SGLT2i.  - If BP tolerates on current GDMT, would recommend addition of MRA.  - Avoid midodrine as this significantly increases afterload and worsens cardiac output in setting of reduced EF and aortic stenosis.   - Not candidate for ICD as needs 3 months of GDMT and assessment of AV first.  - Daily weights, low salt diet, strict I/Os.

## 2024-01-30 NOTE — CARE UPDATE
DSE today consistent with severe AS. Have placed a consult to interventional cardiology to evaluate in the morning for BAV as bridge to TAVR. NPO at MN.     Discussed with staff and will message primary team as well.     Ashu Singh MD PGY6  Cardiovascular Medicine Fellow  [facility]  Pager: [phone]     Complex Repair And Flap Additional Text (Will Appearing After The Standard Complex Repair Text): The complex repair was not sufficient to completely close the primary defect. The remaining additional defect was repaired with the flap mentioned below.

## 2024-01-30 NOTE — HOSPITAL COURSE
Pt  readmitted for acute hypoxic respiratory failure due to acute on chronic heart failure.  Underwent johny/cardioversion on 1/26 with dramatic improvement in symptoms and resolution of hypoxia.  Patient was discharged home that evening.  However, dyspnea and hypoxia returned and patient was readmitted. Initiated heart failure pathway.  Initially treated with IV Lasix.  Now euvolemic and transitioned to Lasix 40 mg p.o. daily.  TTE showed probable severe aortic stenosis and LVEF of 20-25%.  Discontinued midodrine.  Optimizing GDMT for treatment of heart failure reduced ejection fraction.  Started empagliflozin and Entresto.  Continues on home metoprolol succinate.  Given current blood pressure, it does not appear that he will tolerate any additional medications or dose titration.  Underwent dobutamine stress echo on 01/30 consistent with severe aortic stenosis. Following dobutamine infusion valve area found to be 0.5 cm2 with peak velocity of 4.1 m/sec and mean gradient of 42 mm of Hg.  Undergoing evaluation by Interventional Cardiology for balloon aortic valvuloplasty as bridge to TAVR or surgical AVR.  Patient would likely benefit from cardiac rehab.  Will need evaluation for possible AICD, maybe cardiac resynchronization therapy at some point in the next several months.  Patient will need to be discharged with supplemental O2.

## 2024-01-31 ENCOUNTER — TELEPHONE (OUTPATIENT)
Dept: CARDIAC REHAB | Facility: CLINIC | Age: 69
End: 2024-01-31
Payer: MEDICARE

## 2024-01-31 LAB
ALBUMIN SERPL BCP-MCNC: 3.2 G/DL (ref 3.5–5.2)
ALP SERPL-CCNC: 112 U/L (ref 55–135)
ALT SERPL W/O P-5'-P-CCNC: 15 U/L (ref 10–44)
ANION GAP SERPL CALC-SCNC: 7 MMOL/L (ref 8–16)
AST SERPL-CCNC: 17 U/L (ref 10–40)
BASOPHILS # BLD AUTO: 0.08 K/UL (ref 0–0.2)
BASOPHILS NFR BLD: 0.9 % (ref 0–1.9)
BILIRUB SERPL-MCNC: 1 MG/DL (ref 0.1–1)
BUN SERPL-MCNC: 33 MG/DL (ref 8–23)
CALCIUM SERPL-MCNC: 9.1 MG/DL (ref 8.7–10.5)
CHLORIDE SERPL-SCNC: 102 MMOL/L (ref 95–110)
CO2 SERPL-SCNC: 25 MMOL/L (ref 23–29)
CREAT SERPL-MCNC: 1.3 MG/DL (ref 0.5–1.4)
DIFFERENTIAL METHOD BLD: ABNORMAL
EOSINOPHIL # BLD AUTO: 0.2 K/UL (ref 0–0.5)
EOSINOPHIL NFR BLD: 2.3 % (ref 0–8)
ERYTHROCYTE [DISTWIDTH] IN BLOOD BY AUTOMATED COUNT: 20.6 % (ref 11.5–14.5)
EST. GFR  (NO RACE VARIABLE): 59.1 ML/MIN/1.73 M^2
GLUCOSE SERPL-MCNC: 146 MG/DL (ref 70–110)
HCT VFR BLD AUTO: 36 % (ref 40–54)
HGB BLD-MCNC: 9.7 G/DL (ref 14–18)
IMM GRANULOCYTES # BLD AUTO: 0.04 K/UL (ref 0–0.04)
IMM GRANULOCYTES NFR BLD AUTO: 0.5 % (ref 0–0.5)
LYMPHOCYTES # BLD AUTO: 1.4 K/UL (ref 1–4.8)
LYMPHOCYTES NFR BLD: 16.7 % (ref 18–48)
MAGNESIUM SERPL-MCNC: 1.6 MG/DL (ref 1.6–2.6)
MCH RBC QN AUTO: 19.4 PG (ref 27–31)
MCHC RBC AUTO-ENTMCNC: 26.9 G/DL (ref 32–36)
MCV RBC AUTO: 72 FL (ref 82–98)
MONOCYTES # BLD AUTO: 1.1 K/UL (ref 0.3–1)
MONOCYTES NFR BLD: 12.3 % (ref 4–15)
NEUTROPHILS # BLD AUTO: 5.8 K/UL (ref 1.8–7.7)
NEUTROPHILS NFR BLD: 67.3 % (ref 38–73)
NRBC BLD-RTO: 0 /100 WBC
PHOSPHATE SERPL-MCNC: 4.4 MG/DL (ref 2.7–4.5)
PLATELET # BLD AUTO: 182 K/UL (ref 150–450)
PMV BLD AUTO: 11.6 FL (ref 9.2–12.9)
POCT GLUCOSE: 168 MG/DL (ref 70–110)
POCT GLUCOSE: 174 MG/DL (ref 70–110)
POCT GLUCOSE: 192 MG/DL (ref 70–110)
POCT GLUCOSE: 214 MG/DL (ref 70–110)
POTASSIUM SERPL-SCNC: 3.9 MMOL/L (ref 3.5–5.1)
PROT SERPL-MCNC: 6.6 G/DL (ref 6–8.4)
RBC # BLD AUTO: 4.99 M/UL (ref 4.6–6.2)
SODIUM SERPL-SCNC: 134 MMOL/L (ref 136–145)
TACROLIMUS BLD-MCNC: 6.8 NG/ML (ref 5–15)
WBC # BLD AUTO: 8.55 K/UL (ref 3.9–12.7)

## 2024-01-31 PROCEDURE — 99232 SBSQ HOSP IP/OBS MODERATE 35: CPT | Mod: ,,, | Performed by: INTERNAL MEDICINE

## 2024-01-31 PROCEDURE — 99223 1ST HOSP IP/OBS HIGH 75: CPT | Mod: GC,,, | Performed by: INTERNAL MEDICINE

## 2024-01-31 PROCEDURE — 85025 COMPLETE CBC W/AUTO DIFF WBC: CPT

## 2024-01-31 PROCEDURE — 20600001 HC STEP DOWN PRIVATE ROOM

## 2024-01-31 PROCEDURE — 80053 COMPREHEN METABOLIC PANEL: CPT

## 2024-01-31 PROCEDURE — 84100 ASSAY OF PHOSPHORUS: CPT

## 2024-01-31 PROCEDURE — 25000003 PHARM REV CODE 250

## 2024-01-31 PROCEDURE — 25000242 PHARM REV CODE 250 ALT 637 W/ HCPCS: Performed by: STUDENT IN AN ORGANIZED HEALTH CARE EDUCATION/TRAINING PROGRAM

## 2024-01-31 PROCEDURE — 80197 ASSAY OF TACROLIMUS: CPT

## 2024-01-31 PROCEDURE — 25000003 PHARM REV CODE 250: Performed by: STUDENT IN AN ORGANIZED HEALTH CARE EDUCATION/TRAINING PROGRAM

## 2024-01-31 PROCEDURE — 36415 COLL VENOUS BLD VENIPUNCTURE: CPT

## 2024-01-31 PROCEDURE — 63600175 PHARM REV CODE 636 W HCPCS

## 2024-01-31 PROCEDURE — 83735 ASSAY OF MAGNESIUM: CPT

## 2024-01-31 RX ORDER — SODIUM CHLORIDE 0.9 % (FLUSH) 0.9 %
10 SYRINGE (ML) INJECTION
Status: DISCONTINUED | OUTPATIENT
Start: 2024-01-31 | End: 2024-02-02 | Stop reason: HOSPADM

## 2024-01-31 RX ADMIN — SACUBITRIL AND VALSARTAN 1 TABLET: 24; 26 TABLET, FILM COATED ORAL at 08:01

## 2024-01-31 RX ADMIN — INSULIN ASPART 5 UNITS: 100 INJECTION, SOLUTION INTRAVENOUS; SUBCUTANEOUS at 05:01

## 2024-01-31 RX ADMIN — TACROLIMUS 0.5 MG: 0.5 CAPSULE ORAL at 09:01

## 2024-01-31 RX ADMIN — CLOPIDOGREL BISULFATE 75 MG: 75 TABLET ORAL at 09:01

## 2024-01-31 RX ADMIN — EMPAGLIFLOZIN 10 MG: 10 TABLET, FILM COATED ORAL at 09:01

## 2024-01-31 RX ADMIN — SACUBITRIL AND VALSARTAN 1 TABLET: 24; 26 TABLET, FILM COATED ORAL at 09:01

## 2024-01-31 RX ADMIN — FUROSEMIDE 40 MG: 40 TABLET ORAL at 09:01

## 2024-01-31 RX ADMIN — TACROLIMUS 0.5 MG: 0.5 CAPSULE ORAL at 05:01

## 2024-01-31 RX ADMIN — INSULIN DETEMIR 10 UNITS: 100 INJECTION, SOLUTION SUBCUTANEOUS at 10:01

## 2024-01-31 RX ADMIN — INSULIN ASPART 5 UNITS: 100 INJECTION, SOLUTION INTRAVENOUS; SUBCUTANEOUS at 12:01

## 2024-01-31 RX ADMIN — INSULIN DETEMIR 10 UNITS: 100 INJECTION, SOLUTION SUBCUTANEOUS at 08:01

## 2024-01-31 RX ADMIN — ATORVASTATIN CALCIUM 80 MG: 40 TABLET, FILM COATED ORAL at 09:01

## 2024-01-31 RX ADMIN — INSULIN ASPART 1 UNITS: 100 INJECTION, SOLUTION INTRAVENOUS; SUBCUTANEOUS at 08:01

## 2024-01-31 RX ADMIN — APIXABAN 5 MG: 5 TABLET, FILM COATED ORAL at 09:01

## 2024-01-31 RX ADMIN — INSULIN ASPART 5 UNITS: 100 INJECTION, SOLUTION INTRAVENOUS; SUBCUTANEOUS at 10:01

## 2024-01-31 RX ADMIN — PANTOPRAZOLE SODIUM 40 MG: 40 TABLET, DELAYED RELEASE ORAL at 09:01

## 2024-01-31 RX ADMIN — BUPROPION HYDROCHLORIDE 150 MG: 150 TABLET, EXTENDED RELEASE ORAL at 07:01

## 2024-01-31 RX ADMIN — METOPROLOL SUCCINATE 25 MG: 25 TABLET, EXTENDED RELEASE ORAL at 09:01

## 2024-01-31 NOTE — NURSING
"Bed side report done along with night nurse. NICHELLE Raines. Patient yelling and hostile about being NPO without being notified by doctors. Explained to patient doctors are rounding and will be by to see him. DHARMESH Flores MD notified. Says" Interventional Cardiology will be by to see patient." Patient stated, "that's not good enough for him they should have spoke with him prior to making him NP and he doesn't want to talk to anyone anymore so just leave him alone and get out of room unless bringing him some food."  "

## 2024-01-31 NOTE — SUBJECTIVE & OBJECTIVE
Interval History: NAEON. Patient underwent dobutamine stress echo yesterday evening, which was consistent with severe aortic stenosis. Cardiology reached out to Interventional Cardiology with tentative plan for BVA today.    Patient stated he wasn't made aware of NPO status and remained agitated yesterday evening into this morning with verbal escalations toward nursing staff and provider teams. See nursing documentation. Patient verbally abusive toward me on morning pre-rounds, attempted deescalation without resolution, stated I would be back to speak when him when he was ready and willing to respectfully discuss treatment plan    Review of Systems   Unable to perform ROS: Other (Patient refusal to cooperate, verbal escalation)     Objective:     Vital Signs (Most Recent):  Temp: 97.3 °F (36.3 °C) (01/31/24 0801)  Pulse: (!) 56 (01/31/24 0801)  Resp: 20 (01/31/24 0801)  BP: 99/64 (01/31/24 0801)  SpO2: 98 % (01/31/24 0801) Vital Signs (24h Range):  Temp:  [97.2 °F (36.2 °C)-98.2 °F (36.8 °C)] 97.3 °F (36.3 °C)  Pulse:  [] 56  Resp:  [16-20] 20  SpO2:  [92 %-98 %] 98 %  BP: ()/(53-83) 99/64     Weight: 92 kg (202 lb 13.2 oz)  Body mass index is 27.51 kg/m².    Intake/Output Summary (Last 24 hours) at 1/31/2024 0809  Last data filed at 1/31/2024 0510  Gross per 24 hour   Intake --   Output 1550 ml   Net -1550 ml         Physical Exam  Vitals and nursing note reviewed.   Constitutional:       General: He is not in acute distress.  HENT:      Head: Normocephalic and atraumatic.   Eyes:      General: No scleral icterus.     Pupils: Pupils are equal, round, and reactive to light.   Cardiovascular:      Rate and Rhythm: Normal rate.      Pulses: Normal pulses.      Heart sounds: Murmur (systolic) heard.   Pulmonary:      Effort: Pulmonary effort is normal. No respiratory distress.      Breath sounds: Rales present. No wheezing.   Abdominal:      General: Bowel sounds are normal. There is no distension.       Palpations: Abdomen is soft.      Tenderness: There is no abdominal tenderness. There is no guarding.   Musculoskeletal:      Cervical back: Neck supple.      Right lower leg: No edema.      Left lower leg: No edema.   Skin:     General: Skin is warm and dry.   Neurological:      General: No focal deficit present.      Mental Status: He is alert.             Significant Labs: All pertinent labs within the past 24 hours have been reviewed.    Significant Imaging: I have reviewed all pertinent imaging results/findings within the past 24 hours.

## 2024-01-31 NOTE — ASSESSMENT & PLAN NOTE
Underwent dobutamine stress echo 1/30 with Cardiology, consistent with severe aortic stenosis. Recommended Interventional Cardiology consult for BAV bridge to TAVR.     - Follow up Interventional Cardiology recs, greatly appreciate assistance  - NPO at MN when procedure scheduled

## 2024-01-31 NOTE — LETTER
January 31, 2024    Tej Purdy  [address]  [place] [address]             [facility]  [address] [facility].  [place] [address]  Phone: [phone]                                  [facility]   [address] [facility]   [place] [address]  [phone]         [facility]   [address] [place] [address] [phone]         [facility]    [address]  [place] [address] [phone]   Re: Tej Purdy  Clinic number: [id number]    Dear Mr. Purdy:    You were recently admitted to an [facility] facility for cardiac (heart) problem.  Your physician has referred you to [facility].  Cardiac Rehab Phase 2 is an educational and exercise program, conducted in a outpatient setting, proven to help reduce your risk for recurrent heart events.    Cardiac rehab has two major parts:    1. Exercise training to help you achieve cardiovascular fitness while learning how to exercise safely and improve muscle strength and endurance.  Your exercise prescription will be based on the results of the cardiopulmonary stress test (CPX) which will be done before entering the program and at completion.  2. Education, counseling and training to help you understand your heart condition and find ways to reduce your risk of future heart problems.  The cardiac rehab team will help you learn how to cope with the stress of adjusting to a new lifestyle and to deal with your fears about the future.    Phase 2 is a 36-session program, meeting 3 times a week for 12 weeks.  Each session consists of an hour of exercise and half-hour dedicated to the educational topic of the day.  Class days vary per location.  Please contact your nearest facility for details.    Through cardiac rehab you will learn:  About your heart condition, medical therapies, and medication  Risk factors in y our lifestyle contributing to  heart disease  New strategies to modify your risk factors  About a healthy diet that can lower your blood cholesterol, control weight, help prevent or control high blood pressure, and diabetes  How to stop smoking  How to manage stress    If you are interested in getting started, call the [facility] of your choosing.     Sincerely,     [facility]

## 2024-01-31 NOTE — NURSING
. Home Oxygen Evaluation    Date Performed: 2024    1) Patient's Home O2 Sat on room air, while at rest: 86%        If O2 sats on room air at rest are 88% or below, patient qualifies. No additional testing needed. Document N/A in steps 2 and 3. If 89% or above, complete steps 2.      2) Patient's O2 Sat on room air while exercisin%        If O2 sats on room air while exercising remain 89% or above patient does not qualify, no further testing needed Document N/A in step 3. If O2 sats on room air while exercising are 88% or below, continue to step 3.      3) Patient's O2 Sat while exercising on O2: 92% at 2 LPM         (Must show improvement from #2 for patients to qualify)    If O2 sats improve on oxygen, patient qualifies for portable oxygen. If not, the patient does not qualify.

## 2024-01-31 NOTE — PROGRESS NOTES
[facility]  Cardiology  Progress Note    Patient Name: Tej Purdy  MRN: [id number]  Admission Date: 1/27/2024  Hospital Length of Stay: 4 days  Code Status: Full Code   Attending Physician: Fabio Akins MD   Primary Care Physician: Thais Maxwell MD  Expected Discharge Date: 1/31/2024  Principal Problem:Acute hypoxic respiratory failure    Subjective:     Hospital Course:   No notes on file    Interval History: No acute events overnight. DSE showed severe aortic stenosis, IC consulted for BAV. Patient irate again over being made NPO. Otherwise feeling fine.    Review of Systems   Constitutional: Negative for chills, diaphoresis and night sweats.   Cardiovascular:  Positive for dyspnea on exertion. Negative for chest pain, irregular heartbeat, leg swelling, near-syncope and palpitations.   Respiratory:  Positive for shortness of breath. Negative for cough and wheezing.    Skin:  Negative for color change and dry skin.   Musculoskeletal:  Negative for joint pain and joint swelling.   Gastrointestinal:  Positive for bloating. Negative for abdominal pain, diarrhea, nausea and vomiting.   Genitourinary:  Negative for dysuria.   Neurological:  Positive for weakness. Negative for dizziness, headaches and light-headedness.   Psychiatric/Behavioral:          Upset   All other systems reviewed and are negative.    Objective:     Vital Signs (Most Recent):  Temp: 97.3 °F (36.3 °C) (01/31/24 0801)  Pulse: (!) 56 (01/31/24 0801)  Resp: 20 (01/31/24 0801)  BP: 99/64 (01/31/24 0801)  SpO2: 98 % (01/31/24 0801) Vital Signs (24h Range):  Temp:  [97.2 °F (36.2 °C)-98.2 °F (36.8 °C)] 97.3 °F (36.3 °C)  Pulse:  [] 56  Resp:  [16-20] 20  SpO2:  [92 %-98 %] 98 %  BP: ()/(53-83) 99/64     Weight: 92 kg (202 lb 13.2 oz)  Body mass index is 27.51 kg/m².     SpO2: 98 %         Intake/Output Summary (Last 24 hours) at 1/31/2024 0936  Last data filed at 1/31/2024 0510  Gross per 24 hour   Intake  --   Output 1550 ml   Net -1550 ml       Lines/Drains/Airways       Peripheral Intravenous Line  Duration                  Peripheral IV - Single Lumen 01/27/24 1543 18 G Right Forearm 3 days                       Physical Exam  Constitutional:       Appearance: Normal appearance.   HENT:      Mouth/Throat:      Mouth: Mucous membranes are moist.   Eyes:      Extraocular Movements: Extraocular movements intact.   Cardiovascular:      Rate and Rhythm: Normal rate and regular rhythm.      Heart sounds: Murmur heard.      Harsh midsystolic murmur is present with a grade of 4/6 at the upper right sternal border radiating to the neck.   Pulmonary:      Effort: Pulmonary effort is normal.   Abdominal:      Palpations: Abdomen is soft.   Musculoskeletal:      Right lower leg: No edema.      Left lower leg: No edema.   Skin:     General: Skin is warm.   Neurological:      Mental Status: He is alert and oriented to person, place, and time.            Significant Labs: All pertinent lab results from the last 24 hours have been reviewed.    Significant Imaging: All relevant imaging was reviewed    Assessment and Plan:     Acute on chronic heart failure with reduced ejection fraction (HFrEF)  EF drop from 60% 7/22 to 20-25% 12/2 3. Coronary angiogram w/ OM1 CAD s/p PCI.  Concern etiology is now aortic stenosis. Euvolemic on exam.    - Continue GDMT with BB, ARNI, SGLT2i.  - If BP tolerates on current GDMT, would recommend addition of MRA.   - OK to target SBP > 90 instead of MAP > 65.  - Avoid midodrine as this significantly increases afterload and worsens cardiac output in setting of reduced EF and aortic stenosis.  - Not candidate for ICD as needs 3 months of GDMT and assessment of AV first.  - Daily weights, low salt diet, strict I/Os.    Aortic stenosis  Previously noted to be moderate in severity in setting of preserved EF in 2022.  Now looks like low-flow, low-gradient severe AS with EF now 20-25%. RCC looks  fused/fixed.  Recent angiogram reviewed. Concerned that aortic stenosis is driving his symptoms and worsening systolic dysfunction.    - Low-dose  stress echo to elucidate aortic stenosis severity today, was found to be in Afib during the study   - Following dobutamine infusion, the Aortic valve area by VTI is 0.5 cm². Aortic valve peak velocity is 4.1 m/s. Mean gradient is 42 mmHg. Meets criteria for severe aortic stenosis and TAVR workup.  - Interventional Cardiology consulted. Will likely need BAV as bridging therapy prior to TAVR.  - Will arrange for follow-up with IC outpatient for TAVR.    Persistent atrial fibrillation  Last EKG with NSR.   Continue Eliquis and Toprol.   Monitor on telemetry.    Coronary artery disease involving native coronary artery of native heart without angina pectoris  S/p OM1 PCI 12/23.   Continue Plavix, Toprol, and Atorvastatin.         VTE Risk Mitigation (From admission, onward)           Ordered     apixaban tablet 5 mg  2 times daily         01/27/24 1932     IP VTE HIGH RISK PATIENT  Once         01/27/24 1850     Place sequential compression device  Until discontinued         01/27/24 1850     Reason for No Pharmacological VTE Prophylaxis  Once        Question:  Reasons:  Answer:  Already adequately anticoagulated on oral Anticoagulants    01/27/24 1850                    General Cardiology will sign-off. Please call with any additional questions and concerns or if there are acute changes in the patient's clinical status. Thank you for involving us in the care of this patient. Patient discussed with Dr. Talley, staff attestation to follow.      Demarcus Stewart MD  Cardiology  [facility]

## 2024-01-31 NOTE — NURSING
"IC presented to bedside, patient yelling at doctors. NPO discontinued per DHARMESH Flores MD and MD Avtar. MD says" they will not touch patient like that"  "

## 2024-01-31 NOTE — LETTER
January 31, 2024    Tej Purdy  [address]  [place] [address]             [facility] [facility]  [address] [facility].  [place] [address]  Phone: [phone]                                  [facility]   [address] [facility]   [place] [address]  [phone]         [facility]   [address] [place] [address] [phone]         [facility]    [address]  [place] [address] [phone]   Re: Tej Purdy  Clinic number: [id number]    Dear Mr. Purdy:    You were recently admitted to an [facility] facility for cardiac (heart) problem.  Your physician has referred you to [facility].  Cardiac Rehab Phase 2 is an educational and exercise program, conducted in a outpatient setting, proven to help reduce your risk for recurrent heart events.    Cardiac rehab has two major parts:    1. Exercise training to help you achieve cardiovascular fitness while learning how to exercise safely and improve muscle strength and endurance.  Your exercise prescription will be based on the results of the cardiopulmonary stress test (CPX) which will be done before entering the program and at completion.  2. Education, counseling and training to help you understand your heart condition and find ways to reduce your risk of future heart problems.  The cardiac rehab team will help you learn how to cope with the stress of adjusting to a new lifestyle and to deal with your fears about the future.    Phase 2 is a 36-session program, meeting 3 times a week for 12 weeks.  Each session consists of an hour of exercise and half-hour dedicated to the educational topic of the day.  Class days vary per location.  Please contact your nearest facility for details.    Through cardiac rehab you will learn:  About your heart condition, medical therapies, and medication  Risk factors in y our lifestyle contributing  to heart disease  New strategies to modify your risk factors  About a healthy diet that can lower your blood cholesterol, control weight, help prevent or control high blood pressure, and diabetes  How to stop smoking  How to manage stress    If you are interested in getting started, call the [facility] of your choosing.     Sincerely,     [facility]

## 2024-01-31 NOTE — CONSULTS
68 year old man with hx of CASTILLO cirrhosis, cholangiocarcinoma s/p liver transplant in 2022 on tacrolimus, type 2 diabetes mellitus on insulin, hypertension, coronary artery disease s/p LHC +PCI (12/23), severe aortic stenosis, heart failure reduced ejection fraction with LVEF of 20-25%, persistent atrial fibrillation s/p several cardioversions on apixaban, peripheral arterial disease who presents with recurrent shortness of breath and chest tightness. Recently admitted to  on 01/25 for acute hypoxic respiratory failure in the setting of acute on chronic heart failure exacerbation likely due to recurrent atrial fibrillation for which he is now s/p ASHISH + successful cardioversion with IC on 01/26.     Admitted again with hypoxic respiratory failure     Has low flow low gradient severe AS, had Dobutamine stress Echo mean gradient > 40 mmHg    Past Medical History:   Diagnosis Date    Acute appendicitis 06/02/2023    Atrial fibrillation     Calculus of ureter 12/22/2023    Cholangiocarcinoma 03/16/2022    Cirrhosis 11/23/2020    Diabetes mellitus, type 2     Diabetic neuropathy 11/24/2020    Disorder of kidney and ureter     HTN (hypertension) 11/23/2020    Hyperlipidemia 11/23/2020    Kidney stones 11/23/2020    S/p lithotripsy 2020    Leukocytosis 01/15/2021    Liver mass 11/23/2020    CASTILLO (nonalcoholic steatohepatitis) 11/23/2020    Nausea and vomiting 08/21/2023    Other ascites 03/16/2022    PAD (peripheral artery disease)     Portal hypertension 11/23/2020    Skin cancer 11/23/2020     Past Surgical History:   Procedure Laterality Date    ANGIOGRAM, CORONARY, WITH LEFT HEART CATHETERIZATION N/A 12/26/2023    Procedure: Angiogram, Coronary, with Left Heart Cath;  Surgeon: Carlos King MD;  Location: [facility] CATH LAB;  Service: Cardiology;  Laterality: N/A;    COLONOSCOPY  10/2020    ECHOCARDIOGRAM,TRANSESOPHAGEAL N/A 1/26/2024    Procedure: Transesophageal echo (ASHISH) intra-procedure log documentation;   Surgeon: Nick Mathur III, MD;  Location: [facility] EP LAB;  Service: Cardiology;  Laterality: N/A;    ESOPHAGEAL MANOMETRY WITH MEASUREMENT OF IMPEDANCE N/A 7/17/2023    Procedure: MANOMETRY, ESOPHAGUS, WITH IMPEDANCE MEASUREMENT;  Surgeon: Klarissa Zamora MD;  Location: [facility] ENDO (4TH FLR);  Service: Gastroenterology;  Laterality: N/A;  pt diabetic  liver txp  prep insructions sent to pt via email and portal -Jm    ESOPHAGOGASTRODUODENOSCOPY  10/2020    ESOPHAGOGASTRODUODENOSCOPY N/A 7/1/2021    Procedure: EGD (ESOPHAGOGASTRODUODENOSCOPY);  Surgeon: Jovan David MD;  Location: [facility] ENDO (4TH FLR);  Service: Endoscopy;  Laterality: N/A;  HCC. Listed for liver transplant. EGD for variceal surveillance.  cardiac clearance and blood thinenr approval received, see telephone encounter 6/23/21-BB  fully vaccinated-BB  labs same day of procedure-BB    EXCISION OF HYDROCELE Right     hemorroid surgery      hemorroidectomy      KIDNEY STONE SURGERY      LAPAROSCOPIC APPENDECTOMY N/A 6/2/2023    Procedure: APPENDECTOMY, LAPAROSCOPIC;  Surgeon: Shan oRss MD;  Location: [facility] OR 2ND FLR;  Service: General;  Laterality: N/A;    LEFT HEART CATHETERIZATION N/A 1/27/2021    Procedure: Left heart cath;  Surgeon: Kris Shelton MD;  Location: [facility] CATH LAB;  Service: Cardiology;  Laterality: N/A;    liver mass removal      LIVER TRANSPLANT N/A 1/6/2022    Procedure: TRANSPLANT, LIVER;  Surgeon: Lizet Garcia MD;  Location: [facility] OR 2ND FLR;  Service: Transplant;  Laterality: N/A;    RIGHT HEART CATHETERIZATION Right 5/7/2021    Procedure: INSERTION, CATHETER, RIGHT HEART;  Surgeon: Jaden Schuster MD;  Location: [facility] CATH LAB;  Service: Cardiology;  Laterality: Right;    STENT, DRUG ELUTING, SINGLE VESSEL, CORONARY  12/26/2023    Procedure: Stent, Drug Eluting, Single Vessel, Coronary;  Surgeon: Carlos King MD;  Location: [facility] CATH LAB;  Service: Cardiology;;    TREATMENT OF CARDIAC ARRHYTHMIA N/A  2021    Procedure: CARDIOVERSION;  Surgeon: Didier Tilley MD;  Location: [facility] EP LAB;  Service: Cardiology;  Laterality: N/A;  a fib, dccv/johny, mac, dm. sscu    TREATMENT OF CARDIAC ARRHYTHMIA N/A 2021    Procedure: CARDIOVERSION;  Surgeon: Daniel Arambula MD;  Location: [facility] EP LAB;  Service: Cardiology;  Laterality: N/A;  afib, DCCV, anest., DM, 3 prep    TREATMENT OF CARDIAC ARRHYTHMIA N/A 2021    Procedure: Cardioversion or Defibrillation;  Surgeon: Didier Tilley MD;  Location: [facility] EP LAB;  Service: Cardiology;  Laterality: N/A;  AF, JOHNY (cancel if complaint), DCCV, MAC, DM, 3 Prep    TREATMENT OF CARDIAC ARRHYTHMIA N/A 2021    Procedure: Cardioversion or Defibrillation;  Surgeon: Didier Tilley MD;  Location: [facility] EP LAB;  Service: Cardiology;  Laterality: N/A;  AF, JOHNY (cx if complaint), DCCV, MAC, DM, 3 Prep    TREATMENT OF CARDIAC ARRHYTHMIA N/A 2024    Procedure: Cardioversion or Defibrillation;  Surgeon: Koko Knight MD;  Location: [facility] EP LAB;  Service: Cardiology;  Laterality: N/A;  AF, JOHNY, DCCV, MAC, DM, 3 Prep *ADENOSINE TEST* *LIVER TRANSPLANT PATIENT*     Social History     Socioeconomic History    Marital status:    Tobacco Use    Smoking status: Former     Current packs/day: 0.00     Types: Cigarettes     Quit date: 1980     Years since quittin.0    Smokeless tobacco: Former   Substance and Sexual Activity    Alcohol use: Not Currently    Drug use: Never     Social Determinants of Health     Financial Resource Strain: Low Risk  (2024)    Overall Financial Resource Strain (CARDIA)     Difficulty of Paying Living Expenses: Not very hard   Food Insecurity: No Food Insecurity (2024)    Hunger Vital Sign     Worried About Running Out of Food in the Last Year: Never true     Ran Out of Food in the Last Year: Never true   Transportation Needs: No Transportation Needs (2024)    PRAPARE - Transportation     Lack of Transportation  (Medical): No     Lack of Transportation (Non-Medical): No   Physical Activity: Inactive (1/22/2024)    Exercise Vital Sign     Days of Exercise per Week: 0 days     Minutes of Exercise per Session: 0 min   Stress: No Stress Concern Present (1/22/2024)    [employer] - Occupational Stress Questionnaire     Feeling of Stress : Only a little   Recent Concern: Stress - Stress Concern Present (12/26/2023)    [employer] - Occupational Stress Questionnaire     Feeling of Stress : Rather much   Social Connections: Moderately Isolated (1/22/2024)    Social Connection and Isolation Panel [NHANES]     Frequency of Communication with Friends and Family: Once a week     Frequency of Social Gatherings with Friends and Family: Once a week     Attends [employer] Services: Never     Active Member of Clubs or Organizations: No     Attends Club or Organization Meetings: 1 to 4 times per year     Marital Status: Living with partner   Housing Stability: Low Risk  (1/22/2024)    Housing Stability Vital Sign     Unable to Pay for Housing in the Last Year: No     Number of Places Lived in the Last Year: 1     Unstable Housing in the Last Year: No     Family History   Problem Relation Age of Onset    Coronary artery disease Father     Colon cancer Neg Hx     Esophageal cancer Neg Hx      Vitals:    01/31/24 1140 01/31/24 1204 01/31/24 1525 01/31/24 1545   BP:  (!) 89/58  (!) 96/59   BP Location:  Left arm  Left arm   Patient Position:  Lying  Sitting   Pulse: 77 76 79 72   Resp: 18   17   Temp: 97.8 °F (36.6 °C)   97.3 °F (36.3 °C)   TempSrc: Tympanic   Tympanic   SpO2: (!) 92%   98%   Weight:       Height:         Cardiovascular:      Rate and Rhythm: Normal rate and regular rhythm.      Heart sounds: Murmur heard.      Harsh midsystolic murmur is present with a grade of 4/6 at the upper right sternal border radiating to the neck.   Pulmonary:      Effort: Pulmonary effort is  normal.   Abdominal:      Palpations: Abdomen is soft.   Musculoskeletal:      Right lower leg: No edema.      Left lower leg: No edema.     Component      Latest Ref Rng 1/31/2024   WBC      3.90 - 12.70 K/uL 8.55    RBC      4.60 - 6.20 M/uL 4.99    Hemoglobin      14.0 - 18.0 g/dL 9.7 (L)    Hematocrit      40.0 - 54.0 % 36.0 (L)    MCV      82 - 98 fL 72 (L)    MCH      27.0 - 31.0 pg 19.4 (L)    MCHC      32.0 - 36.0 g/dL 26.9 (L)    RDW      11.5 - 14.5 % 20.6 (H)    Platelet Count      150 - 450 K/uL 182    MPV      9.2 - 12.9 fL 11.6    Immature Granulocytes      0.0 - 0.5 % 0.5    Gran # (ANC)      1.8 - 7.7 K/uL 5.8    Immature Grans (Abs)      0.00 - 0.04 K/uL 0.04    Lymph #      1.0 - 4.8 K/uL 1.4    Mono #      0.3 - 1.0 K/uL 1.1 (H)    Eos #      0.0 - 0.5 K/uL 0.2    Baso #      0.00 - 0.20 K/uL 0.08    nRBC      0 /100 WBC 0    Gran %      38.0 - 73.0 % 67.3    Lymph %      18.0 - 48.0 % 16.7 (L)    Mono %      4.0 - 15.0 % 12.3    Eosinophil %      0.0 - 8.0 % 2.3    Basophil %      0.0 - 1.9 % 0.9    Differential Method Automated    Magnesium       1.6 - 2.6 mg/dL 1.6    Phosphorus Level      2.7 - 4.5 mg/dL 4.4      Component      Latest Ref Rng 1/31/2024   Sodium      136 - 145 mmol/L 134 (L)    Potassium      3.5 - 5.1 mmol/L 3.9    Chloride      95 - 110 mmol/L 102    CO2      23 - 29 mmol/L 25    Glucose      70 - 110 mg/dL 146 (H)    BUN      8 - 23 mg/dL 33 (H)    Creatinine      0.5 - 1.4 mg/dL 1.3    Calcium      8.7 - 10.5 mg/dL 9.1    PROTEIN TOTAL      6.0 - 8.4 g/dL 6.6    Albumin      3.5 - 5.2 g/dL 3.2 (L)    BILIRUBIN TOTAL      0.1 - 1.0 mg/dL 1.0    ALP      55 - 135 U/L 112    AST      10 - 40 U/L 17    ALT      10 - 44 U/L 15    eGFR      >60 mL/min/1.73 m^2 59.1 !    Anion Gap      8 - 16 mmol/L 7 (L)       Dobutamine stress Echo    Low dose dobutamine study for aortic stenosis.    Left Ventricle: The left ventricle is dilated. Normal wall thickness. Severe global hypokinesis  present. There is severely reduced systolic function with a visually estimated ejection fraction of 10 -15%. Unable to assess diastolic function due to atrial fibrillation.    Right Ventricle: Normal right ventricular cavity size. Wall thickness is normal. Right ventricle wall motion  is normal. Systolic function is normal.    Aortic Valve: There is severe stenosis. Baseline Aortic valve area by VTI is 0.6 cm². Aortic valve peak velocity is 3.2 m/s. Mean gradient is 26 mmHg. The dimensionless index is 0.16. Following dobutamine infusion, the Aortic valve area by VTI is 0.5 cm². Aortic valve peak velocity is 4.1 m/s. Mean gradient is 42 mmHg. The dimensionless index is 0.14. LVOT diameter is 2.2 cm.    Mitral Valve: There is mild mitral annular calcification present.    IVC/SVC: Normal venous pressure at 3 mmHg.    Baseline ECG: The Baseline ECG reveals atrial fibrillation with non-specific intraventricular conduction delay. The axis is normal. The ST segments are normal.    Stress ECG: There are no ST segment deviation identified during the protocol. There is normal blood pressure response with stress.    Post-stress Impression: Following dobutamine administration there is severe aortic stenosis w a valve area <1.0 cm2, peak velocity >4 m/s and mean gradient > 40 mm Hg.      Assessment and plan:    _ Severe AS with severely reduced EF and recurrent admissions with HF  _ Proceed with BAV  _ Access RCFA   _ Antiplatelet continue Plavix 75 mg daily   _ Cr 1.3  _ Hold Eliquis tonight and tomorrow morning, will resume after the procedure   _ Risks and benefits were discussed with patient, all questions were answered     Maninder Nova MD  Interventional Cardiology Fellow

## 2024-01-31 NOTE — PROGRESS NOTES
Eric [facility]uelos - Cardiology [facility] Medicine  Progress Note    Patient Name: Tej Purdy  MRN: [id number]  Patient Class: IP- Inpatient   Admission Date: 1/27/2024  Length of Stay: 4 days  Attending Physician: Fabio Akins MD  Primary Care Provider: Thais Maxwell MD        Subjective:     Principal Problem:Acute hypoxic respiratory failure        HPI:  Tej Purdy is a 68 year old man with hx of CASTILLO cirrhosis, cholangiocarcinoma s/p liver transplant in 2022 on tacrolimus, type 2 diabetes mellitus on insulin, hypertension, coronary artery disease s/p LHC +PCI (12/23), severe aortic stenosis, heart failure reduced ejection fraction with LVEF of 20-25%, persistent atrial fibrillation s/p several cardioversions on apixaban, peripheral arterial disease who presents with recurrent shortness of breath and chest tightness. Recently admitted to  on 01/25 for acute hypoxic respiratory failure in the setting of acute on chronic heart failure exacerbation likely due to recurrent atrial fibrillation for which he is now s/p JOHNY + successful cardioversion with IC on 01/26. He was diuresed with furosemide 40 IV BID during this brief hospital admission with appropriate response and was discharged the evening of 01/26 after improvement in his symptoms.     While in the ED: patient found to be hypoxic w SpO2 87% on RA upon arrival in the ED with improvement after 2L NC, otherwise HDS. EKG without ST elevation. BNP >962. Trop negative. CXR with cardiomegaly with increased pulmonary venous pressure likely pulmonary edema/CHF, grossly unchanged from prior.     Admitted to  for acute hypoxic respiratory failure in the setting of acute on chronic congestive heart failure secondary to volume overload.     Overview/Hospital Course:   Pt readmitted for acute hypoxic respiratory failure due to acute on chronic heart failure.  Underwent johny/cardioversion on 1/26 with dramatic improvement in symptoms and resolution of  hypoxia.  Patient was discharged home that evening.  However, dyspnea and hypoxia returned and patient was readmitted.  Initiated heart failure pathway.  Started patient on Lasix 40 mg IV b.i.d..  Now appears euvolemic on exam with evidence of contraction alkalosis.  TTE with CVP of 3.  Shows probable severe aortic stenosis and LVEF of 20-25%.  Decreased Lasix dose to 40 mg p.o. daily.  Discontinued midodrine.  Will attempt to optimize GDMT for treatment of heart failure reduced ejection fraction.  Started empagliflozin and Entresto.  Continues on home metoprolol succinate.  Patient would likely benefit from cardiac rehab, aortic valve replacement, AICD, maybe cardiac resynchronization therapy.     Interval History: NAEON. Patient underwent dobutamine stress echo yesterday evening, which was consistent with severe aortic stenosis. Cardiology reached out to Interventional Cardiology with tentative plan for BVA today.    Patient stated he wasn't made aware of NPO status and remained agitated yesterday evening into this morning with verbal escalations toward nursing staff and provider teams. See nursing documentation. Patient verbally abusive toward me on morning pre-rounds, attempted deescalation without resolution, stated I would be back to speak when him when he was ready and willing to respectfully discuss treatment plan    Review of Systems   Unable to perform ROS: Other (Patient refusal to cooperate, verbal escalation)     Objective:     Vital Signs (Most Recent):  Temp: 97.3 °F (36.3 °C) (01/31/24 0801)  Pulse: (!) 56 (01/31/24 0801)  Resp: 20 (01/31/24 0801)  BP: 99/64 (01/31/24 0801)  SpO2: 98 % (01/31/24 0801) Vital Signs (24h Range):  Temp:  [97.2 °F (36.2 °C)-98.2 °F (36.8 °C)] 97.3 °F (36.3 °C)  Pulse:  [] 56  Resp:  [16-20] 20  SpO2:  [92 %-98 %] 98 %  BP: ()/(53-83) 99/64     Weight: 92 kg (202 lb 13.2 oz)  Body mass index is 27.51 kg/m².    Intake/Output Summary (Last 24 hours) at 1/31/2024  0809  Last data filed at 1/31/2024 0510  Gross per 24 hour   Intake --   Output 1550 ml   Net -1550 ml         Physical Exam  Vitals and nursing note reviewed.   Constitutional:       General: He is not in acute distress.  HENT:      Head: Normocephalic and atraumatic.   Eyes:      General: No scleral icterus.     Pupils: Pupils are equal, round, and reactive to light.   Cardiovascular:      Rate and Rhythm: Normal rate.      Pulses: Normal pulses.      Heart sounds: Murmur (systolic) heard.   Pulmonary:      Effort: Pulmonary effort is normal. No respiratory distress.      Breath sounds: Rales present. No wheezing.   Abdominal:      General: Bowel sounds are normal. There is no distension.      Palpations: Abdomen is soft.      Tenderness: There is no abdominal tenderness. There is no guarding.   Musculoskeletal:      Cervical back: Neck supple.      Right lower leg: No edema.      Left lower leg: No edema.   Skin:     General: Skin is warm and dry.   Neurological:      General: No focal deficit present.      Mental Status: He is alert.             Significant Labs: All pertinent labs within the past 24 hours have been reviewed.    Significant Imaging: I have reviewed all pertinent imaging results/findings within the past 24 hours.    Assessment/Plan:      * Acute hypoxic respiratory failure  68 year old male with hx of HFrEF (LVEF 20-25%), persistent atrial fibrillation refractory to multiple cardioversions who presents with recurrent shortness of breath and chest tightness similar to his presentation few days prior. Recently discharged after improvement of symptoms s/p ASHISH + successful cardioversion with Interventional Cardiology on 01/26/24. Hypoxic in the ED with SpO2 87% on RA, improved with 2L NC, otherwise HDS. EKG w NSR, intermittent PACs. CXR c/w increased pulmonary venous congestion, pulmonary edema likely related to CHF.     Admitted to  for acute hypoxic respiratory failure in the setting of acute on  chronic heart failure exacerbation due to volume overload.     Improved  --continue furosemide 40 PO daily  --wean oxygen as tolerated.  --strict I's/O's, daily weight  --fluid restriction 1.5L/day   --tele, daily labs.  --will benefit with transition to oral diuretic upon discharge.    History of liver transplant  Hx of CASTILLO cirrhosis, choriocarcinoma s/p liver transplant 2022 on tacrolimus. Last tacrolimus level 7.8 (01/26/24).    --continue home tacrolimus.  --daily CMP and tacrolimus levels  --liver transplant surgery consulted, appreciate recs.     Acute on chronic heart failure with reduced ejection fraction (HFrEF)  Hx of HFrEF (LVEF 20-25%); not on GDMT, currently on midodrine. CXR c/w pulmonary edema, increased pulmonary venous congestion. Tolerated IV diuresis with furosemide 40 BID during recent admit. Not on lasix at home.     --Continue furosemide 40 PO daily  --referral to heart failure transitional care clinic with discharge.  --Ambulatory referral to Cardiac Rehab on discharge  --cardiac diet, fluid restriction at 1.5L/day  --strict I/Os and daily standing weights  --maintain on telemetry and daily EKGs, maintain Mag >2 & K+ >4.  --ambulate as tolerated with assistance.   --see other details in HFrEF.   Started pt on Toprol XL, Jardiance, and entresto.   --Consult to cards; appreciate recs        HFrEF (heart failure with reduced ejection fraction)  Hx of HFrEF with LVEF 20-25%; not on GDMT other than metoprolol succinate 25 daily due to being on midodrine.    Baseline BNP:   Lab Results   Component Value Date     (H) 01/27/2024     (H) 01/25/2024     (H) 12/29/2023     (H) 12/22/2023    BNP 91 04/11/2022      Results for orders placed during the hospital encounter of 12/22/23    Echo    Interpretation Summary    Left Ventricle: The left ventricle is normal in size. Mildly increased wall thickness. There is concentric remodeling. There is severely reduced systolic function  with a visually estimated ejection fraction of 20 - 25%. All walls except for the basal septum are hypokinetic. When directly compared to images from 7/5/2022 there has been a significant change in left ventricular systolic function.    LV Diastolic function: Unable to assess diastolic function due to E-A fusion. Average E/e' ratio is 11.    Right Ventricle: Normal right ventricular cavity size. Wall thickness is normal. Right ventricle wall motion  is normal. Systolic function is normal.    Left Atrium: Left atrium is severely dilated.    Aortic Valve: There is moderate to severe low gradient aortic stenosis. Aortic valve area by VTI is 0.97 cm². LVOT diameter is 2.4 cm. Aortic valve peak velocity is 3.24 m/s. Mean gradient is 28 mmHg. The dimensionless index is 0.21. There is no significant regurgitation.    Mitral Valve: There is mild to moderate regurgitation.    Tricuspid Valve: There is mild to moderate regurgitation.    Pulmonary Artery: The estimated pulmonary artery systolic pressure is 38 mm Hg.    IVC/SVC: Low central venous pressure.    Pericardium: There is a very small circumferential effusion; slightly more fluid is located laterally.        Aortic stenosis  Underwent dobutamine stress echo 1/30 with Cardiology, consistent with severe aortic stenosis. Recommended Interventional Cardiology consult for BAV bridge to TAVR.     - Follow up Interventional Cardiology recs, greatly appreciate assistance  - NPO at MN when procedure scheduled      Coronary artery disease involving native coronary artery of native heart without angina pectoris  --continue home clopidogrel and high-intensity statin.  --continuous telemetry    Type 2 diabetes mellitus, with long-term current use of insulin  Last HbA1c 7.5 (01/26/24). Home insulin regimen includes glargine 22U nightly, lispro 6U TIDWM and SSI.     --insulin detemir 10U BID, LDSSI.   --diabetic diet  --POCT glucose checks TIDWM and qhs  --goal glucose  140-180      Antihyperglycemics (From admission, onward)      Start     Stop Route Frequency Ordered    01/30/24 1130  insulin aspart U-100 pen 5 Units         -- SubQ 3 times daily with meals 01/30/24 0808    01/28/24 1545  empagliflozin (Jardiance) tablet 10 mg        Question Answer Comment   Does this patient have a diagnosis of heart failure? Yes    Does this patient have type 1 diabetes or diabetic ketoacidosis? No    Does this patient have symptomatic hypotension? No    Is the patient NPO or pending major surgery in next 3 days or less? No        -- Oral Daily 01/28/24 1538    01/27/24 2100  insulin detemir U-100 (Levemir) pen 10 Units         -- SubQ 2 times daily 01/27/24 1937    01/27/24 2036  insulin aspart U-100 pen 0-5 Units         -- SubQ Before meals & nightly PRN 01/27/24 1937                Primary hypertension  Home meds for hypertension were reviewed and noted below.   Hypertension Medications               metoprolol succinate (TOPROL-XL) 25 MG 24 hr tablet Take 1 tablet (25 mg total) by mouth once daily.          --continue home antihypertensives    Hyperlipidemia  --continue home statin.      VTE Risk Mitigation (From admission, onward)           Ordered     apixaban tablet 5 mg  2 times daily         01/27/24 1932     IP VTE HIGH RISK PATIENT  Once         01/27/24 1850     Place sequential compression device  Until discontinued         01/27/24 1850     Reason for No Pharmacological VTE Prophylaxis  Once        Question:  Reasons:  Answer:  Already adequately anticoagulated on oral Anticoagulants    01/27/24 1850                    Discharge Planning   FELISHA: 1/30/2024     Code Status: Full Code   Is the patient medically ready for discharge?:     Reason for patient still in hospital (select all that apply): Treatment, Consult recommendations, and Pending disposition  Discharge Plan A: Home with family                  Marceci DO Maya  Department of Hospital Medicine   [facility]  Cardiology Stepdown

## 2024-01-31 NOTE — SUBJECTIVE & OBJECTIVE
Interval History: No acute events overnight. DSE showed severe aortic stenosis, IC consulted for BAV. Patient irate again over being made NPO. Otherwise feeling fine.    Review of Systems   Constitutional: Negative for chills, diaphoresis and night sweats.   Cardiovascular:  Positive for dyspnea on exertion. Negative for chest pain, irregular heartbeat, leg swelling, near-syncope and palpitations.   Respiratory:  Positive for shortness of breath. Negative for cough and wheezing.    Skin:  Negative for color change and dry skin.   Musculoskeletal:  Negative for joint pain and joint swelling.   Gastrointestinal:  Positive for bloating. Negative for abdominal pain, diarrhea, nausea and vomiting.   Genitourinary:  Negative for dysuria.   Neurological:  Positive for weakness. Negative for dizziness, headaches and light-headedness.   Psychiatric/Behavioral:          Upset   All other systems reviewed and are negative.    Objective:     Vital Signs (Most Recent):  Temp: 97.3 °F (36.3 °C) (01/31/24 0801)  Pulse: (!) 56 (01/31/24 0801)  Resp: 20 (01/31/24 0801)  BP: 99/64 (01/31/24 0801)  SpO2: 98 % (01/31/24 0801) Vital Signs (24h Range):  Temp:  [97.2 °F (36.2 °C)-98.2 °F (36.8 °C)] 97.3 °F (36.3 °C)  Pulse:  [] 56  Resp:  [16-20] 20  SpO2:  [92 %-98 %] 98 %  BP: ()/(53-83) 99/64     Weight: 92 kg (202 lb 13.2 oz)  Body mass index is 27.51 kg/m².     SpO2: 98 %         Intake/Output Summary (Last 24 hours) at 1/31/2024 0929  Last data filed at 1/31/2024 0510  Gross per 24 hour   Intake --   Output 1550 ml   Net -1550 ml       Lines/Drains/Airways       Peripheral Intravenous Line  Duration                  Peripheral IV - Single Lumen 01/27/24 1543 18 G Right Forearm 3 days                       Physical Exam  Constitutional:       Appearance: Normal appearance.   HENT:      Mouth/Throat:      Mouth: Mucous membranes are moist.   Eyes:      Extraocular Movements: Extraocular movements intact.   Cardiovascular:       Rate and Rhythm: Normal rate and regular rhythm.      Heart sounds: Murmur heard.      Harsh midsystolic murmur is present with a grade of 4/6 at the upper right sternal border radiating to the neck.   Pulmonary:      Effort: Pulmonary effort is normal.   Abdominal:      Palpations: Abdomen is soft.   Musculoskeletal:      Right lower leg: No edema.      Left lower leg: No edema.   Skin:     General: Skin is warm.   Neurological:      Mental Status: He is alert and oriented to person, place, and time.            Significant Labs: All pertinent lab results from the last 24 hours have been reviewed.    Significant Imaging: All relevant imaging was reviewed

## 2024-01-31 NOTE — ASSESSMENT & PLAN NOTE
Hx of HFrEF (LVEF 20-25%); not on GDMT, currently on midodrine. CXR c/w pulmonary edema, increased pulmonary venous congestion. Tolerated IV diuresis with furosemide 40 BID during recent admit. Not on lasix at home.     --Continue furosemide 40 PO daily  --referral to heart failure transitional care clinic with discharge.  --Ambulatory referral to Cardiac Rehab on discharge  --cardiac diet, fluid restriction at 1.5L/day  --strict I/Os and daily standing weights  --maintain on telemetry and daily EKGs, maintain Mag >2 & K+ >4.  --ambulate as tolerated with assistance.   --see other details in HFrEF.   Started pt on Toprol XL, Jardiance, and entresto.   --Consult to cards; appreciate recs

## 2024-01-31 NOTE — ASSESSMENT & PLAN NOTE
Previously noted to be moderate in severity in setting of preserved EF in 2022.  Now looks like low-flow, low-gradient severe AS with EF now 20-25%. RCC looks fused/fixed.  Recent angiogram reviewed. Concerned that aortic stenosis is driving his symptoms and worsening systolic dysfunction.    - Low-dose  stress echo to elucidate aortic stenosis severity today, was found to be in Afib during the study   - Following dobutamine infusion, the Aortic valve area by VTI is 0.5 cm². Aortic valve peak velocity is 4.1 m/s. Mean gradient is 42 mmHg. Meets criteria for severe aortic stenosis and TAVR workup.  - Interventional Cardiology consulted. Will likely need BAV as bridging therapy prior to TAVR.  - Will arrange for follow-up with IC outpatient for TAVR.

## 2024-01-31 NOTE — ASSESSMENT & PLAN NOTE
EF drop from 60% 7/22 to 20-25% 12/2 3. Coronary angiogram w/ OM1 CAD s/p PCI.  Concern etiology is now aortic stenosis. Euvolemic on exam.    - Continue GDMT with BB, ARNI, SGLT2i.  - If BP tolerates on current GDMT, would recommend addition of MRA.   - OK to target SBP > 90 instead of MAP > 65.  - Avoid midodrine as this significantly increases afterload and worsens cardiac output in setting of reduced EF and aortic stenosis.  - Not candidate for ICD as needs 3 months of GDMT and assessment of AV first.  - Daily weights, low salt diet, strict I/Os.

## 2024-01-31 NOTE — CARE UPDATE
RAPID RESPONSE NURSE ROUND       Rounding completed with charge RN, Blanche Blanco for tachycardia. Reports pt HR now in the 80s. No additional concerns verbalized at this time. Instructed to call [phone] for further concerns or assistance.

## 2024-01-31 NOTE — ASSESSMENT & PLAN NOTE
68 year old male with hx of HFrEF (LVEF 20-25%), persistent atrial fibrillation refractory to multiple cardioversions who presents with recurrent shortness of breath and chest tightness similar to his presentation few days prior. Recently discharged after improvement of symptoms s/p ASHISH + successful cardioversion with Interventional Cardiology on 01/26/24. Hypoxic in the ED with SpO2 87% on RA, improved with 2L NC, otherwise HDS. EKG w NSR, intermittent PACs. CXR c/w increased pulmonary venous congestion, pulmonary edema likely related to CHF.     Admitted to  for acute hypoxic respiratory failure in the setting of acute on chronic heart failure exacerbation due to volume overload.     Improved  --continue furosemide 40 PO daily  --wean oxygen as tolerated.  --strict I's/O's, daily weight  --fluid restriction 1.5L/day   --tele, daily labs.  --will benefit with transition to oral diuretic upon discharge.

## 2024-02-01 PROBLEM — D50.9 IRON DEFICIENCY ANEMIA: Status: ACTIVE | Noted: 2024-02-01

## 2024-02-01 LAB
ABO + RH BLD: NORMAL
ALBUMIN SERPL BCP-MCNC: 3.3 G/DL (ref 3.5–5.2)
ALP SERPL-CCNC: 104 U/L (ref 55–135)
ALT SERPL W/O P-5'-P-CCNC: 14 U/L (ref 10–44)
ANION GAP SERPL CALC-SCNC: 10 MMOL/L (ref 8–16)
AST SERPL-CCNC: 15 U/L (ref 10–40)
BASOPHILS # BLD AUTO: 0.06 K/UL (ref 0–0.2)
BASOPHILS NFR BLD: 0.7 % (ref 0–1.9)
BILIRUB SERPL-MCNC: 0.7 MG/DL (ref 0.1–1)
BLD GP AB SCN CELLS X3 SERPL QL: NORMAL
BUN SERPL-MCNC: 34 MG/DL (ref 8–23)
CALCIUM SERPL-MCNC: 8.6 MG/DL (ref 8.7–10.5)
CHLORIDE SERPL-SCNC: 105 MMOL/L (ref 95–110)
CO2 SERPL-SCNC: 23 MMOL/L (ref 23–29)
CREAT SERPL-MCNC: 1.5 MG/DL (ref 0.5–1.4)
DIFFERENTIAL METHOD BLD: ABNORMAL
EOSINOPHIL # BLD AUTO: 0.2 K/UL (ref 0–0.5)
EOSINOPHIL NFR BLD: 2.5 % (ref 0–8)
ERYTHROCYTE [DISTWIDTH] IN BLOOD BY AUTOMATED COUNT: 20.8 % (ref 11.5–14.5)
EST. GFR  (NO RACE VARIABLE): 49.8 ML/MIN/1.73 M^2
GLUCOSE SERPL-MCNC: 146 MG/DL (ref 70–110)
HCT VFR BLD AUTO: 36.5 % (ref 40–54)
HGB BLD-MCNC: 9.9 G/DL (ref 14–18)
IMM GRANULOCYTES # BLD AUTO: 0.04 K/UL (ref 0–0.04)
IMM GRANULOCYTES NFR BLD AUTO: 0.5 % (ref 0–0.5)
LYMPHOCYTES # BLD AUTO: 1.5 K/UL (ref 1–4.8)
LYMPHOCYTES NFR BLD: 17.1 % (ref 18–48)
MAGNESIUM SERPL-MCNC: 1.6 MG/DL (ref 1.6–2.6)
MCH RBC QN AUTO: 19.2 PG (ref 27–31)
MCHC RBC AUTO-ENTMCNC: 27.1 G/DL (ref 32–36)
MCV RBC AUTO: 71 FL (ref 82–98)
MONOCYTES # BLD AUTO: 1 K/UL (ref 0.3–1)
MONOCYTES NFR BLD: 11.4 % (ref 4–15)
NEUTROPHILS # BLD AUTO: 5.8 K/UL (ref 1.8–7.7)
NEUTROPHILS NFR BLD: 67.8 % (ref 38–73)
NRBC BLD-RTO: 0 /100 WBC
PHOSPHATE SERPL-MCNC: 4.4 MG/DL (ref 2.7–4.5)
PLATELET # BLD AUTO: 183 K/UL (ref 150–450)
PMV BLD AUTO: 10.8 FL (ref 9.2–12.9)
POCT GLUCOSE: 167 MG/DL (ref 70–110)
POCT GLUCOSE: 174 MG/DL (ref 70–110)
POCT GLUCOSE: 179 MG/DL (ref 70–110)
POCT GLUCOSE: 211 MG/DL (ref 70–110)
POTASSIUM SERPL-SCNC: 3.7 MMOL/L (ref 3.5–5.1)
PROT SERPL-MCNC: 6.7 G/DL (ref 6–8.4)
RBC # BLD AUTO: 5.15 M/UL (ref 4.6–6.2)
SODIUM SERPL-SCNC: 138 MMOL/L (ref 136–145)
SPECIMEN OUTDATE: NORMAL
TACROLIMUS BLD-MCNC: 6.3 NG/ML (ref 5–15)
WBC # BLD AUTO: 8.54 K/UL (ref 3.9–12.7)

## 2024-02-01 PROCEDURE — 27201423 OPTIME MED/SURG SUP & DEVICES STERILE SUPPLY: Performed by: INTERNAL MEDICINE

## 2024-02-01 PROCEDURE — C1769 GUIDE WIRE: HCPCS | Performed by: INTERNAL MEDICINE

## 2024-02-01 PROCEDURE — 25500020 PHARM REV CODE 255: Performed by: INTERNAL MEDICINE

## 2024-02-01 PROCEDURE — 83735 ASSAY OF MAGNESIUM: CPT

## 2024-02-01 PROCEDURE — 25000003 PHARM REV CODE 250

## 2024-02-01 PROCEDURE — 92986 REVISION OF AORTIC VALVE: CPT | Mod: ,,, | Performed by: INTERNAL MEDICINE

## 2024-02-01 PROCEDURE — 86901 BLOOD TYPING SEROLOGIC RH(D): CPT | Performed by: HOSPITALIST

## 2024-02-01 PROCEDURE — 36415 COLL VENOUS BLD VENIPUNCTURE: CPT | Performed by: HOSPITALIST

## 2024-02-01 PROCEDURE — 99152 MOD SED SAME PHYS/QHP 5/>YRS: CPT | Performed by: INTERNAL MEDICINE

## 2024-02-01 PROCEDURE — 36415 COLL VENOUS BLD VENIPUNCTURE: CPT | Mod: XB

## 2024-02-01 PROCEDURE — 25000003 PHARM REV CODE 250: Performed by: INTERNAL MEDICINE

## 2024-02-01 PROCEDURE — 25000003 PHARM REV CODE 250: Performed by: STUDENT IN AN ORGANIZED HEALTH CARE EDUCATION/TRAINING PROGRAM

## 2024-02-01 PROCEDURE — 80053 COMPREHEN METABOLIC PANEL: CPT

## 2024-02-01 PROCEDURE — 92986 REVISION OF AORTIC VALVE: CPT | Performed by: INTERNAL MEDICINE

## 2024-02-01 PROCEDURE — 85025 COMPLETE CBC W/AUTO DIFF WBC: CPT

## 2024-02-01 PROCEDURE — 99152 MOD SED SAME PHYS/QHP 5/>YRS: CPT | Mod: ,,, | Performed by: INTERNAL MEDICINE

## 2024-02-01 PROCEDURE — 63600175 PHARM REV CODE 636 W HCPCS: Performed by: INTERNAL MEDICINE

## 2024-02-01 PROCEDURE — 20600001 HC STEP DOWN PRIVATE ROOM

## 2024-02-01 PROCEDURE — C1725 CATH, TRANSLUMIN NON-LASER: HCPCS | Performed by: INTERNAL MEDICINE

## 2024-02-01 PROCEDURE — 93010 ELECTROCARDIOGRAM REPORT: CPT | Mod: ,,, | Performed by: INTERNAL MEDICINE

## 2024-02-01 PROCEDURE — C1760 CLOSURE DEV, VASC: HCPCS | Performed by: INTERNAL MEDICINE

## 2024-02-01 PROCEDURE — 63600175 PHARM REV CODE 636 W HCPCS

## 2024-02-01 PROCEDURE — 99153 MOD SED SAME PHYS/QHP EA: CPT | Performed by: INTERNAL MEDICINE

## 2024-02-01 PROCEDURE — 84100 ASSAY OF PHOSPHORUS: CPT

## 2024-02-01 PROCEDURE — 80197 ASSAY OF TACROLIMUS: CPT

## 2024-02-01 PROCEDURE — C1894 INTRO/SHEATH, NON-LASER: HCPCS | Performed by: INTERNAL MEDICINE

## 2024-02-01 PROCEDURE — 93005 ELECTROCARDIOGRAM TRACING: CPT

## 2024-02-01 RX ORDER — HEPARIN SODIUM 1000 [USP'U]/ML
INJECTION, SOLUTION INTRAVENOUS; SUBCUTANEOUS
Status: DISCONTINUED | OUTPATIENT
Start: 2024-02-01 | End: 2024-02-02

## 2024-02-01 RX ORDER — PHENYLEPHRINE HYDROCHLORIDE 10 MG/ML
INJECTION INTRAVENOUS
Status: DISCONTINUED | OUTPATIENT
Start: 2024-02-01 | End: 2024-02-02

## 2024-02-01 RX ORDER — ACETAMINOPHEN 325 MG/1
650 TABLET ORAL EVERY 4 HOURS PRN
Status: DISCONTINUED | OUTPATIENT
Start: 2024-02-01 | End: 2024-02-02 | Stop reason: HOSPADM

## 2024-02-01 RX ORDER — DIPHENHYDRAMINE HCL 50 MG
50 CAPSULE ORAL ONCE
Status: DISCONTINUED | OUTPATIENT
Start: 2024-02-01 | End: 2024-02-02

## 2024-02-01 RX ORDER — HEPARIN SOD,PORCINE/0.9 % NACL 1000/500ML
INTRAVENOUS SOLUTION INTRAVENOUS
Status: DISCONTINUED | OUTPATIENT
Start: 2024-02-01 | End: 2024-02-02

## 2024-02-01 RX ORDER — ONDANSETRON 8 MG/1
8 TABLET, ORALLY DISINTEGRATING ORAL EVERY 8 HOURS PRN
Status: DISCONTINUED | OUTPATIENT
Start: 2024-02-01 | End: 2024-02-01

## 2024-02-01 RX ORDER — MIDAZOLAM HYDROCHLORIDE 1 MG/ML
INJECTION, SOLUTION INTRAMUSCULAR; INTRAVENOUS
Status: DISCONTINUED | OUTPATIENT
Start: 2024-02-01 | End: 2024-02-02

## 2024-02-01 RX ORDER — LIDOCAINE HYDROCHLORIDE 20 MG/ML
INJECTION INTRAVENOUS
Status: DISCONTINUED | OUTPATIENT
Start: 2024-02-01 | End: 2024-02-02

## 2024-02-01 RX ORDER — CEFAZOLIN SODIUM 1 G/3ML
INJECTION, POWDER, FOR SOLUTION INTRAMUSCULAR; INTRAVENOUS
Status: DISCONTINUED | OUTPATIENT
Start: 2024-02-01 | End: 2024-02-02

## 2024-02-01 RX ORDER — SODIUM CHLORIDE 9 MG/ML
INJECTION, SOLUTION INTRAVENOUS CONTINUOUS
Status: ACTIVE | OUTPATIENT
Start: 2024-02-01 | End: 2024-02-01

## 2024-02-01 RX ORDER — FERROUS SULFATE 325(65) MG
325 TABLET ORAL DAILY
Qty: 30 TABLET | Refills: 2 | Status: SHIPPED | OUTPATIENT
Start: 2024-02-01

## 2024-02-01 RX ORDER — LIDOCAINE HYDROCHLORIDE 20 MG/ML
INJECTION, SOLUTION EPIDURAL; INFILTRATION; INTRACAUDAL; PERINEURAL
Status: DISCONTINUED | OUTPATIENT
Start: 2024-02-01 | End: 2024-02-02

## 2024-02-01 RX ORDER — FENTANYL CITRATE 50 UG/ML
INJECTION, SOLUTION INTRAMUSCULAR; INTRAVENOUS
Status: DISCONTINUED | OUTPATIENT
Start: 2024-02-01 | End: 2024-02-02

## 2024-02-01 RX ADMIN — INSULIN ASPART 1 UNITS: 100 INJECTION, SOLUTION INTRAVENOUS; SUBCUTANEOUS at 08:02

## 2024-02-01 RX ADMIN — INSULIN ASPART 5 UNITS: 100 INJECTION, SOLUTION INTRAVENOUS; SUBCUTANEOUS at 12:02

## 2024-02-01 RX ADMIN — TACROLIMUS 0.5 MG: 0.5 CAPSULE ORAL at 05:02

## 2024-02-01 RX ADMIN — INSULIN DETEMIR 10 UNITS: 100 INJECTION, SOLUTION SUBCUTANEOUS at 08:02

## 2024-02-01 RX ADMIN — INSULIN ASPART 5 UNITS: 100 INJECTION, SOLUTION INTRAVENOUS; SUBCUTANEOUS at 05:02

## 2024-02-01 RX ADMIN — SACUBITRIL AND VALSARTAN 1 TABLET: 24; 26 TABLET, FILM COATED ORAL at 09:02

## 2024-02-01 RX ADMIN — TACROLIMUS 0.5 MG: 0.5 CAPSULE ORAL at 08:02

## 2024-02-01 RX ADMIN — ATORVASTATIN CALCIUM 80 MG: 40 TABLET, FILM COATED ORAL at 08:02

## 2024-02-01 RX ADMIN — CLOPIDOGREL BISULFATE 75 MG: 75 TABLET ORAL at 08:02

## 2024-02-01 RX ADMIN — SACUBITRIL AND VALSARTAN 1 TABLET: 24; 26 TABLET, FILM COATED ORAL at 08:02

## 2024-02-01 RX ADMIN — BUPROPION HYDROCHLORIDE 150 MG: 150 TABLET, EXTENDED RELEASE ORAL at 06:02

## 2024-02-01 RX ADMIN — METOPROLOL SUCCINATE 25 MG: 25 TABLET, EXTENDED RELEASE ORAL at 08:02

## 2024-02-01 RX ADMIN — SODIUM CHLORIDE: 0.9 INJECTION, SOLUTION INTRAVENOUS at 08:02

## 2024-02-01 RX ADMIN — FUROSEMIDE 40 MG: 40 TABLET ORAL at 08:02

## 2024-02-01 RX ADMIN — PANTOPRAZOLE SODIUM 40 MG: 40 TABLET, DELAYED RELEASE ORAL at 08:02

## 2024-02-01 NOTE — ASSESSMENT & PLAN NOTE
Underwent dobutamine stress echo 1/30 with Cardiology, consistent with severe aortic stenosis. Recommended Interventional Cardiology consult for BAV bridge to TAVR.     - Follow up Interventional Cardiology recs, BAV complete. Possible TAVR in outpt setting

## 2024-02-01 NOTE — PROGRESS NOTES
[facility]  Progress Note    Patient Name: Tej Purdy  MRN: [id number]  Patient Class: IP- Inpatient   Admission Date: 1/27/2024  Length of Stay: 5 days  Attending Physician: Fabio Akins MD  Primary Care Provider: Thais Maxwell MD        Subjective:     Principal Problem:Acute on chronic HFrEF (heart failure with reduced ejection fraction)        HPI:  Tej Purdy is a 68 year old man with hx of CASTILLO cirrhosis, cholangiocarcinoma s/p liver transplant in 2022 on tacrolimus, type 2 diabetes mellitus on insulin, hypertension, coronary artery disease s/p LHC +PCI (12/23), severe aortic stenosis, heart failure reduced ejection fraction with LVEF of 20-25%, persistent atrial fibrillation s/p several cardioversions on apixaban, peripheral arterial disease who presents with recurrent shortness of breath and chest tightness. Recently admitted to  on 01/25 for acute hypoxic respiratory failure in the setting of acute on chronic heart failure exacerbation likely due to recurrent atrial fibrillation for which he is now s/p JOHNY + successful cardioversion with IC on 01/26. He was diuresed with furosemide 40 IV BID during this brief hospital admission with appropriate response and was discharged the evening of 01/26 after improvement in his symptoms.     While in the ED: patient found to be hypoxic w SpO2 87% on RA upon arrival in the ED with improvement after 2L NC, otherwise HDS. EKG without ST elevation. BNP >962. Trop negative. CXR with cardiomegaly with increased pulmonary venous pressure likely pulmonary edema/CHF, grossly unchanged from prior.     Admitted to  for acute hypoxic respiratory failure in the setting of acute on chronic congestive heart failure secondary to volume overload.     Overview/Hospital Course:   Pt readmitted for acute hypoxic respiratory failure due to acute on chronic heart failure.  Underwent johny/cardioversion on 1/26 with dramatic  improvement in symptoms and resolution of hypoxia.  Patient was discharged home that evening.  However, dyspnea and hypoxia returned and patient was readmitted.  Initiated heart failure pathway.  Started patient on Lasix 40 mg IV b.i.d..  Now appears euvolemic on exam with evidence of contraction alkalosis.  TTE with CVP of 3.  Shows probable severe aortic stenosis and LVEF of 20-25%.  Decreased Lasix dose to 40 mg p.o. daily.  Discontinued midodrine.  Will attempt to optimize GDMT for treatment of heart failure reduced ejection fraction.  Started empagliflozin and Entresto.  Continues on home metoprolol succinate.  Patient would likely benefit from cardiac rehab, aortic valve replacement, AICD, maybe cardiac resynchronization therapy.     Interval History: NAONE. Pt underwent BAV w/ interventional cardiology today. Currently recovering. Cr increasing. Trending. Likely transient from entresto.     Review of Systems   Constitutional:  Negative for chills and fever.   HENT:  Negative for congestion, rhinorrhea, sore throat and trouble swallowing.    Eyes:  Negative for visual disturbance.   Respiratory:  Positive for chest tightness and shortness of breath. Negative for cough, choking and wheezing.         Improving   Cardiovascular:  Negative for chest pain, palpitations and leg swelling.   Gastrointestinal:  Negative for abdominal pain, constipation, diarrhea, nausea and vomiting.   Genitourinary:  Negative for decreased urine volume, difficulty urinating, dysuria, frequency and urgency.   Musculoskeletal:  Negative for arthralgias, back pain and myalgias.   Skin:  Negative for rash and wound.   Neurological:  Negative for dizziness, weakness, light-headedness and headaches.     Objective:     Vital Signs (Most Recent):  Temp: 97.8 °F (36.6 °C) (02/01/24 1230)  Pulse: 90 (02/01/24 1300)  Resp: (!) 31 (02/01/24 1130)  BP: 122/69 (02/01/24 1300)  SpO2: 96 % (02/01/24 1300) Vital Signs (24h Range):  Temp:  [97.3 °F  (36.3 °C)-98.7 °F (37.1 °C)] 97.8 °F (36.6 °C)  Pulse:  [] 90  Resp:  [13-31] 31  SpO2:  [96 %-100 %] 96 %  BP: ()/(55-80) 122/69     Weight: 92.2 kg (203 lb 4.2 oz)  Body mass index is 27.57 kg/m².    Intake/Output Summary (Last 24 hours) at 2/1/2024 1329  Last data filed at 2/1/2024 0813  Gross per 24 hour   Intake 462 ml   Output 975 ml   Net -513 ml         Physical Exam  Vitals and nursing note reviewed.   Constitutional:       General: He is not in acute distress.  HENT:      Head: Normocephalic and atraumatic.   Eyes:      General: No scleral icterus.     Pupils: Pupils are equal, round, and reactive to light.   Cardiovascular:      Rate and Rhythm: Normal rate.      Pulses: Normal pulses.      Heart sounds: Murmur (systolic) heard.   Pulmonary:      Effort: Pulmonary effort is normal. No respiratory distress.      Breath sounds: Rales present. No wheezing.   Abdominal:      General: Bowel sounds are normal. There is no distension.      Palpations: Abdomen is soft.      Tenderness: There is no abdominal tenderness. There is no guarding.   Musculoskeletal:      Cervical back: Neck supple.      Right lower leg: No edema.      Left lower leg: No edema.   Skin:     General: Skin is warm and dry.   Neurological:      General: No focal deficit present.      Mental Status: He is alert.             Significant Labs: All pertinent labs within the past 24 hours have been reviewed.  CBC:   Recent Labs   Lab 01/31/24 0358 02/01/24 0338   WBC 8.55 8.54   HGB 9.7* 9.9*   HCT 36.0* 36.5*    183     CMP:   Recent Labs   Lab 01/31/24  0358 02/01/24 0338   * 138   K 3.9 3.7    105   CO2 25 23   * 146*   BUN 33* 34*   CREATININE 1.3 1.5*   CALCIUM 9.1 8.6*   PROT 6.6 6.7   ALBUMIN 3.2* 3.3*   BILITOT 1.0 0.7   ALKPHOS 112 104   AST 17 15   ALT 15 14   ANIONGAP 7* 10       Significant Imaging: I have reviewed all pertinent imaging results/findings within the past 24  hours.    Assessment/Plan:      * Acute on chronic heart failure with reduced ejection fraction (HFrEF)  Hx of HFrEF (LVEF 20-25%); not on GDMT, currently on midodrine. CXR c/w pulmonary edema, increased pulmonary venous congestion. Tolerated IV diuresis with furosemide 40 BID during recent admit. Not on lasix at home.     --Continue furosemide 40 PO daily  --referral to heart failure transitional care clinic with discharge.  --Ambulatory referral to Cardiac Rehab on discharge  --cardiac diet, fluid restriction at 1.5L/day  --strict I/Os and daily standing weights  --maintain on telemetry and daily EKGs, maintain Mag >2 & K+ >4.  --ambulate as tolerated with assistance.   --see other details in HFrEF.   Started pt on Toprol XL, Jardiance, and entresto.   --Consult to cards; appreciate recs        Iron deficiency anemia  Patient's anemia is currently controlled. Has not received any PRBCs to date. Etiology likely d/t Iron deficiency  Current CBC reviewed-   Lab Results   Component Value Date    HGB 9.9 (L) 02/01/2024    HCT 36.5 (L) 02/01/2024     Monitor serial CBC and transfuse if patient becomes hemodynamically unstable, symptomatic or H/H drops below 7/21.  -iron supplementation on discharge    History of liver transplant  Hx of CASTILLO cirrhosis, choriocarcinoma s/p liver transplant 2022 on tacrolimus. Last tacrolimus level 7.8 (01/26/24).    --continue home tacrolimus.  --daily CMP and tacrolimus levels  --liver transplant surgery consulted, appreciate recs.     Acute hypoxic respiratory failure  68 year old male with hx of HFrEF (LVEF 20-25%), persistent atrial fibrillation refractory to multiple cardioversions who presents with recurrent shortness of breath and chest tightness similar to his presentation few days prior. Recently discharged after improvement of symptoms s/p ASHISH + successful cardioversion with Interventional Cardiology on 01/26/24. Hypoxic in the ED with SpO2 87% on RA, improved with 2L NC,  otherwise HDS. EKG w NSR, intermittent PACs. CXR c/w increased pulmonary venous congestion, pulmonary edema likely related to CHF.     Admitted to  for acute hypoxic respiratory failure in the setting of acute on chronic heart failure exacerbation due to volume overload.     Improved  --continue furosemide 40 PO daily  --wean oxygen as tolerated.  --strict I's/O's, daily weight  --fluid restriction 1.5L/day   --tele, daily labs.  --will benefit with transition to oral diuretic upon discharge.    Aortic stenosis  Underwent dobutamine stress echo 1/30 with Cardiology, consistent with severe aortic stenosis. Recommended Interventional Cardiology consult for BAV bridge to TAVR.     - Follow up Interventional Cardiology recs, BAV complete. Possible TAVR in outpt setting        Coronary artery disease involving native coronary artery of native heart without angina pectoris  --continue home clopidogrel and high-intensity statin.  --continuous telemetry    Type 2 diabetes mellitus, with long-term current use of insulin  Last HbA1c 7.5 (01/26/24). Home insulin regimen includes glargine 22U nightly, lispro 6U TIDWM and SSI.     --insulin detemir 10U BID, LDSSI.   --diabetic diet  --POCT glucose checks TIDWM and qhs  --goal glucose 140-180      Antihyperglycemics (From admission, onward)      Start     Stop Route Frequency Ordered    01/30/24 1130  insulin aspart U-100 pen 5 Units         -- SubQ 3 times daily with meals 01/30/24 0808    01/28/24 1545  empagliflozin (Jardiance) tablet 10 mg        Question Answer Comment   Does this patient have a diagnosis of heart failure? Yes    Does this patient have type 1 diabetes or diabetic ketoacidosis? No    Does this patient have symptomatic hypotension? No    Is the patient NPO or pending major surgery in next 3 days or less? No        -- Oral Daily 01/28/24 1538    01/27/24 2100  insulin detemir U-100 (Levemir) pen 10 Units         -- SubQ 2 times daily 01/27/24 1937    01/27/24  2036  insulin aspart U-100 pen 0-5 Units         -- SubQ Before meals & nightly PRN 01/27/24 1937                Primary hypertension  Home meds for hypertension were reviewed and noted below.   Hypertension Medications               metoprolol succinate (TOPROL-XL) 25 MG 24 hr tablet Take 1 tablet (25 mg total) by mouth once daily.          --continue home antihypertensives    Hyperlipidemia  --continue home statin.      VTE Risk Mitigation (From admission, onward)           Ordered     heparin (porcine) injection  As needed (PRN)         02/01/24 1024     heparin infusion 1,000 units/500 ml in 0.9% NaCl (on sterile field)  As needed (PRN)         02/01/24 0954     IP VTE HIGH RISK PATIENT  Once         01/27/24 1850     Place sequential compression device  Until discontinued         01/27/24 1850     Reason for No Pharmacological VTE Prophylaxis  Once        Question:  Reasons:  Answer:  Already adequately anticoagulated on oral Anticoagulants    01/27/24 1850                    Discharge Planning   FELISHA: 2/2/2024     Code Status: Full Code   Is the patient medically ready for discharge?:     Reason for patient still in hospital (select all that apply): Treatment  Discharge Plan A: Home with family                  Stalin Garces MD  Department of Hospital Medicine   [facility]

## 2024-02-01 NOTE — PROCEDURES
Brief Operative Note:    : Shilo Carrasco MD     Referring Physician: Self,Aaareferral     All Operators: Surgeon(s):  Maninder Nova MD Maitas, Oscar, MD Shahjehan, Rai Dilawar D., MD Tafur Soto, Jose D., MD     Preoperative Diagnosis: Aortic valve stenosis, etiology of cardiac valve disease unspecified [I35.0]  Acute on chronic heart failure [I50.9]     Postop Diagnosis: Aortic valve stenosis, etiology of cardiac valve disease unspecified [I35.0]  Acute on chronic heart failure [I50.9]    Treatments/Procedures: Procedure(s) (LRB):  REPAIR, AORTIC VALVE (N/A)    Access: R CFA    Findings:  Peak to peak gradient 12 pre BAV in the setting of low EF    See catheterization report for full details.    Intervention:   S/p BAV with True Flow Valvuloplasty Perfusion Catheter, following BAV no significant peak to peak gradient left    See catheterization report for full details.    Closure device: perclosed x with perclose 2       Plan:  - Transfer back to hospital floor   - Post cath protocol   - No IVF with low EF and no sig contrast use  - Bed rest x 6 hours   - Continue high intensity statin therapy (LDL goal < 70)  - Risk factor reduction (BP <130/80 mmHg, glycemic control, etc)  - Cardiac rehab referral  - Follow up with outpatient cardiologist    Estimated Blood loss: 20 cc    Specimens removed: No    Elfego Gonsalez MD  Interventional Cardiology PGY-5  02/01/2024

## 2024-02-01 NOTE — ASSESSMENT & PLAN NOTE
Patient's anemia is currently controlled. Has not received any PRBCs to date. Etiology likely d/t Iron deficiency  Current CBC reviewed-   Lab Results   Component Value Date    HGB 9.9 (L) 02/01/2024    HCT 36.5 (L) 02/01/2024     Monitor serial CBC and transfuse if patient becomes hemodynamically unstable, symptomatic or H/H drops below 7/21.  -iron supplementation on discharge

## 2024-02-01 NOTE — NURSING TRANSFER
Nursing Transfer Note      2/1/2024   9:55 AM    Reason patient is being transferred: BAV    Transfer From: CSU    Transfer via bed    Transfer with 2L NC O2, cardiac monitoring    Transported by Patient escort  Telemetry: Box Number [id number]  Order for Tele Monitor? Yes    Additional Lines: Oxygen    4eyes on Skin: yes    Medicines sent: Normal Saline @75  ml/hr      Patient belongings transferred with patient: No    Chart send with patient: Yes

## 2024-02-01 NOTE — NURSING
Patients a.m. blood sugar 167. Pt. NPO d/t surgical procedure this a.mMarcus 10units Shaniqua held per Stalin Garces MD.

## 2024-02-01 NOTE — SUBJECTIVE & OBJECTIVE
Interval History: NAONE. Pt underwent BAV w/ interventional cardiology today. Currently recovering. Cr increasing. Trending. Likely transient from entresto.     Review of Systems   Constitutional:  Negative for chills and fever.   HENT:  Negative for congestion, rhinorrhea, sore throat and trouble swallowing.    Eyes:  Negative for visual disturbance.   Respiratory:  Positive for chest tightness and shortness of breath. Negative for cough, choking and wheezing.         Improving   Cardiovascular:  Negative for chest pain, palpitations and leg swelling.   Gastrointestinal:  Negative for abdominal pain, constipation, diarrhea, nausea and vomiting.   Genitourinary:  Negative for decreased urine volume, difficulty urinating, dysuria, frequency and urgency.   Musculoskeletal:  Negative for arthralgias, back pain and myalgias.   Skin:  Negative for rash and wound.   Neurological:  Negative for dizziness, weakness, light-headedness and headaches.     Objective:     Vital Signs (Most Recent):  Temp: 97.8 °F (36.6 °C) (02/01/24 1230)  Pulse: 90 (02/01/24 1300)  Resp: (!) 31 (02/01/24 1130)  BP: 122/69 (02/01/24 1300)  SpO2: 96 % (02/01/24 1300) Vital Signs (24h Range):  Temp:  [97.3 °F (36.3 °C)-98.7 °F (37.1 °C)] 97.8 °F (36.6 °C)  Pulse:  [] 90  Resp:  [13-31] 31  SpO2:  [96 %-100 %] 96 %  BP: ()/(55-80) 122/69     Weight: 92.2 kg (203 lb 4.2 oz)  Body mass index is 27.57 kg/m².    Intake/Output Summary (Last 24 hours) at 2/1/2024 1329  Last data filed at 2/1/2024 0813  Gross per 24 hour   Intake 462 ml   Output 975 ml   Net -513 ml         Physical Exam  Vitals and nursing note reviewed.   Constitutional:       General: He is not in acute distress.  HENT:      Head: Normocephalic and atraumatic.   Eyes:      General: No scleral icterus.     Pupils: Pupils are equal, round, and reactive to light.   Cardiovascular:      Rate and Rhythm: Normal rate.      Pulses: Normal pulses.      Heart sounds: Murmur  (systolic) heard.   Pulmonary:      Effort: Pulmonary effort is normal. No respiratory distress.      Breath sounds: Rales present. No wheezing.   Abdominal:      General: Bowel sounds are normal. There is no distension.      Palpations: Abdomen is soft.      Tenderness: There is no abdominal tenderness. There is no guarding.   Musculoskeletal:      Cervical back: Neck supple.      Right lower leg: No edema.      Left lower leg: No edema.   Skin:     General: Skin is warm and dry.   Neurological:      General: No focal deficit present.      Mental Status: He is alert.             Significant Labs: All pertinent labs within the past 24 hours have been reviewed.  CBC:   Recent Labs   Lab 01/31/24 0358 02/01/24 0338   WBC 8.55 8.54   HGB 9.7* 9.9*   HCT 36.0* 36.5*    183     CMP:   Recent Labs   Lab 01/31/24 0358 02/01/24 0338   * 138   K 3.9 3.7    105   CO2 25 23   * 146*   BUN 33* 34*   CREATININE 1.3 1.5*   CALCIUM 9.1 8.6*   PROT 6.6 6.7   ALBUMIN 3.2* 3.3*   BILITOT 1.0 0.7   ALKPHOS 112 104   AST 17 15   ALT 15 14   ANIONGAP 7* 10       Significant Imaging: I have reviewed all pertinent imaging results/findings within the past 24 hours.

## 2024-02-01 NOTE — NURSING
Patient returned back to unit,rm 346 from cath lab supine with HOB flat. AAOX4. PERRLA. 2L O2 nasal cannula, SPO2 99%. VS Stable   (see flowsheet.) Brachial and radial pulses palpable +2 bilaterally.R groin site per close x2.Covered with gauze and transparent dressing. No bleeding,discharge,bruising or hematoma noted at groin site.Dorsalis pedis and Posterior tibialis pulses palpable +1 bilaterally. Patient denies any HA, pain,  dizziness, lightheadedness, SOB or numbness or tingling in any extremities.    1200- scant amount of bleeding noted on gauze at R groin site, outlined.VS Stable, monitoring  continued.    1207- small amount bleeding noted on gauze @ R groin site.second outline made on gauze. Dorsalis pedis pulse palpable +1 bilaterally.VS stable,monitoring continued.     1237: Moderate amount of bleeding noted and outlined. Charge nurse notified and presented to bedside. Dressing removed and pressure held.Gauze and tegaderm reapplied to R groin site.Patietnt asymptomatic.VS stable(see flowsheet).Dorsalis pedis pulse palpable +1 bilaterally. Dr. Nova paged, no call back.      1345: Second dressing applied to R groin saturated.Dressing removed and manual pressure applied.VS Stable(see flowsheet)and patient asymptomatic.  Dorsalis pedis and posterior tibialis pulse palpable +1 bilaterally. On call interventional Cardiologist, Bonifacio Sharpe MD paged.MD notified of track bleed at R groin site and saturation of second dressing. Dr. Nova presented to bedside and continued to apply pressure to R  groin site.Quick clot(hemostatic pad), gauze and Tegaderm applied to R groin site per MD Avtar.Groin  site remains free of bruising or hematoma. Patient stable and asymptomatic. VS stable. Denies any pain, HA, blurred vision, lightheadedness, dizziness. Plan of care ongoing.

## 2024-02-02 VITALS
DIASTOLIC BLOOD PRESSURE: 58 MMHG | BODY MASS INDEX: 27.8 KG/M2 | RESPIRATION RATE: 18 BRPM | SYSTOLIC BLOOD PRESSURE: 117 MMHG | OXYGEN SATURATION: 96 % | HEART RATE: 72 BPM | HEIGHT: 72 IN | TEMPERATURE: 98 F | WEIGHT: 205.25 LBS

## 2024-02-02 DIAGNOSIS — I35.0 AORTIC VALVE STENOSIS, ETIOLOGY OF CARDIAC VALVE DISEASE UNSPECIFIED: Primary | ICD-10-CM

## 2024-02-02 LAB
ALBUMIN SERPL BCP-MCNC: 3.3 G/DL (ref 3.5–5.2)
ALBUMIN SERPL BCP-MCNC: 3.4 G/DL (ref 3.5–5.2)
ALP SERPL-CCNC: 100 U/L (ref 55–135)
ALP SERPL-CCNC: 92 U/L (ref 55–135)
ALT SERPL W/O P-5'-P-CCNC: 14 U/L (ref 10–44)
ALT SERPL W/O P-5'-P-CCNC: 14 U/L (ref 10–44)
ANION GAP SERPL CALC-SCNC: 11 MMOL/L (ref 8–16)
ANION GAP SERPL CALC-SCNC: 7 MMOL/L (ref 8–16)
AST SERPL-CCNC: 18 U/L (ref 10–40)
AST SERPL-CCNC: 24 U/L (ref 10–40)
BASOPHILS # BLD AUTO: 0.06 K/UL (ref 0–0.2)
BASOPHILS NFR BLD: 0.7 % (ref 0–1.9)
BILIRUB SERPL-MCNC: 0.7 MG/DL (ref 0.1–1)
BILIRUB SERPL-MCNC: 0.7 MG/DL (ref 0.1–1)
BUN SERPL-MCNC: 36 MG/DL (ref 8–23)
BUN SERPL-MCNC: 38 MG/DL (ref 8–23)
CALCIUM SERPL-MCNC: 9 MG/DL (ref 8.7–10.5)
CALCIUM SERPL-MCNC: 9.2 MG/DL (ref 8.7–10.5)
CHLORIDE SERPL-SCNC: 105 MMOL/L (ref 95–110)
CHLORIDE SERPL-SCNC: 105 MMOL/L (ref 95–110)
CO2 SERPL-SCNC: 23 MMOL/L (ref 23–29)
CO2 SERPL-SCNC: 26 MMOL/L (ref 23–29)
CREAT SERPL-MCNC: 1.3 MG/DL (ref 0.5–1.4)
CREAT SERPL-MCNC: 1.4 MG/DL (ref 0.5–1.4)
DIFFERENTIAL METHOD BLD: ABNORMAL
EOSINOPHIL # BLD AUTO: 0.1 K/UL (ref 0–0.5)
EOSINOPHIL NFR BLD: 1.5 % (ref 0–8)
ERYTHROCYTE [DISTWIDTH] IN BLOOD BY AUTOMATED COUNT: 20.7 % (ref 11.5–14.5)
EST. GFR  (NO RACE VARIABLE): 54.1 ML/MIN/1.73 M^2
EST. GFR  (NO RACE VARIABLE): 59.1 ML/MIN/1.73 M^2
GLUCOSE SERPL-MCNC: 174 MG/DL (ref 70–110)
GLUCOSE SERPL-MCNC: 193 MG/DL (ref 70–110)
HCT VFR BLD AUTO: 33.4 % (ref 40–54)
HGB BLD-MCNC: 9.1 G/DL (ref 14–18)
IMM GRANULOCYTES # BLD AUTO: 0.03 K/UL (ref 0–0.04)
IMM GRANULOCYTES NFR BLD AUTO: 0.3 % (ref 0–0.5)
LYMPHOCYTES # BLD AUTO: 1 K/UL (ref 1–4.8)
LYMPHOCYTES NFR BLD: 11.9 % (ref 18–48)
MAGNESIUM SERPL-MCNC: 1.6 MG/DL (ref 1.6–2.6)
MCH RBC QN AUTO: 19.2 PG (ref 27–31)
MCHC RBC AUTO-ENTMCNC: 27.2 G/DL (ref 32–36)
MCV RBC AUTO: 71 FL (ref 82–98)
MONOCYTES # BLD AUTO: 1.2 K/UL (ref 0.3–1)
MONOCYTES NFR BLD: 13.5 % (ref 4–15)
NEUTROPHILS # BLD AUTO: 6.3 K/UL (ref 1.8–7.7)
NEUTROPHILS NFR BLD: 72.1 % (ref 38–73)
NRBC BLD-RTO: 0 /100 WBC
PHOSPHATE SERPL-MCNC: 3.7 MG/DL (ref 2.7–4.5)
PLATELET # BLD AUTO: 180 K/UL (ref 150–450)
PMV BLD AUTO: 11.9 FL (ref 9.2–12.9)
POCT GLUCOSE: 173 MG/DL (ref 70–110)
POCT GLUCOSE: 194 MG/DL (ref 70–110)
POTASSIUM SERPL-SCNC: 3.9 MMOL/L (ref 3.5–5.1)
POTASSIUM SERPL-SCNC: 4.2 MMOL/L (ref 3.5–5.1)
PROT SERPL-MCNC: 6.5 G/DL (ref 6–8.4)
PROT SERPL-MCNC: 6.7 G/DL (ref 6–8.4)
RBC # BLD AUTO: 4.73 M/UL (ref 4.6–6.2)
SODIUM SERPL-SCNC: 138 MMOL/L (ref 136–145)
SODIUM SERPL-SCNC: 139 MMOL/L (ref 136–145)
TACROLIMUS BLD-MCNC: 5.5 NG/ML (ref 5–15)
WBC # BLD AUTO: 8.72 K/UL (ref 3.9–12.7)

## 2024-02-02 PROCEDURE — 25000003 PHARM REV CODE 250: Performed by: INTERNAL MEDICINE

## 2024-02-02 PROCEDURE — 80053 COMPREHEN METABOLIC PANEL: CPT | Mod: 91

## 2024-02-02 PROCEDURE — 36415 COLL VENOUS BLD VENIPUNCTURE: CPT | Mod: XB

## 2024-02-02 PROCEDURE — 25000003 PHARM REV CODE 250: Performed by: STUDENT IN AN ORGANIZED HEALTH CARE EDUCATION/TRAINING PROGRAM

## 2024-02-02 PROCEDURE — 25000242 PHARM REV CODE 250 ALT 637 W/ HCPCS: Performed by: STUDENT IN AN ORGANIZED HEALTH CARE EDUCATION/TRAINING PROGRAM

## 2024-02-02 PROCEDURE — 83735 ASSAY OF MAGNESIUM: CPT

## 2024-02-02 PROCEDURE — 63600175 PHARM REV CODE 636 W HCPCS

## 2024-02-02 PROCEDURE — 25000003 PHARM REV CODE 250

## 2024-02-02 PROCEDURE — 36415 COLL VENOUS BLD VENIPUNCTURE: CPT

## 2024-02-02 PROCEDURE — 84100 ASSAY OF PHOSPHORUS: CPT

## 2024-02-02 PROCEDURE — 80053 COMPREHEN METABOLIC PANEL: CPT

## 2024-02-02 PROCEDURE — 80197 ASSAY OF TACROLIMUS: CPT

## 2024-02-02 PROCEDURE — 85025 COMPLETE CBC W/AUTO DIFF WBC: CPT

## 2024-02-02 RX ORDER — FUROSEMIDE 40 MG/1
40 TABLET ORAL DAILY
Qty: 30 TABLET | Refills: 11 | Status: SHIPPED | OUTPATIENT
Start: 2024-02-02 | End: 2024-02-09

## 2024-02-02 RX ADMIN — FUROSEMIDE 40 MG: 40 TABLET ORAL at 09:02

## 2024-02-02 RX ADMIN — SACUBITRIL AND VALSARTAN 1 TABLET: 24; 26 TABLET, FILM COATED ORAL at 09:02

## 2024-02-02 RX ADMIN — INSULIN ASPART 5 UNITS: 100 INJECTION, SOLUTION INTRAVENOUS; SUBCUTANEOUS at 12:02

## 2024-02-02 RX ADMIN — ATORVASTATIN CALCIUM 80 MG: 40 TABLET, FILM COATED ORAL at 09:02

## 2024-02-02 RX ADMIN — BUPROPION HYDROCHLORIDE 150 MG: 150 TABLET, EXTENDED RELEASE ORAL at 06:02

## 2024-02-02 RX ADMIN — INSULIN DETEMIR 10 UNITS: 100 INJECTION, SOLUTION SUBCUTANEOUS at 09:02

## 2024-02-02 RX ADMIN — TACROLIMUS 0.5 MG: 0.5 CAPSULE ORAL at 09:02

## 2024-02-02 RX ADMIN — CLOPIDOGREL BISULFATE 75 MG: 75 TABLET ORAL at 09:02

## 2024-02-02 RX ADMIN — PANTOPRAZOLE SODIUM 40 MG: 40 TABLET, DELAYED RELEASE ORAL at 09:02

## 2024-02-02 RX ADMIN — EMPAGLIFLOZIN 10 MG: 10 TABLET, FILM COATED ORAL at 09:02

## 2024-02-02 RX ADMIN — ACETAMINOPHEN 650 MG: 325 TABLET ORAL at 02:02

## 2024-02-02 RX ADMIN — METOPROLOL SUCCINATE 25 MG: 25 TABLET, EXTENDED RELEASE ORAL at 09:02

## 2024-02-02 RX ADMIN — INSULIN ASPART 5 UNITS: 100 INJECTION, SOLUTION INTRAVENOUS; SUBCUTANEOUS at 09:02

## 2024-02-02 NOTE — NURSING
Received report from NICHELLE Reddy. Patient alert and oriented. No  pain. No sign of distress. Iv line in place- saline locked. Tele monitor in place.On room air. Angio site (Rt groin) checked - dressing intact, dry with no bleeding noted.Call bell given on his reach. Instructed to call for any concerns or assistance. Bed on lowest position. Bed wheels locked. Non skid socks on. Fall precaution in place.

## 2024-02-02 NOTE — NURSING
Patient is ready for discharge.VS Stable(see flowsheet).Patient stable alert and oriented x4. IVs removed. No complaints of pain, other than tenderness reported at R groin. No bleeding, discharge, redness, bruising, or hematoma noted to groin site.Dorsalis pedis pulses palpable +1 bilaterally. Discussed discharge plan. Reviewed medications and side effects, appointments, and answered questions with patient and spouse. __ RX delivered to bedside.Patient has no further questions. Patient wheeled down in wheelchair by  wife.

## 2024-02-02 NOTE — PLAN OF CARE
Problem: Diabetes Comorbidity  Goal: Blood Glucose Level Within Targeted Range  Outcome: Ongoing, Progressing  Intervention: Monitor and Manage Glycemia  Flowsheets (Taken 2/2/2024 4746)  Glycemic Management:   blood glucose monitored   supplemental insulin given

## 2024-02-02 NOTE — NURSING
"Patient educated on importance and purpose of fluid restriction. Also the proper way to calculate fluid intake, for the second time today, first occurrence 02/01/2024. Patient non compliant, excessive fluid intake. Wife continues to bring outside food and fluids. 1L of milk carton found at patient bedside this morning along with empty 16oz.water bottles. When asked patient said he "drunk milk throughout the night" and when reeducated, replied "well".  "

## 2024-02-02 NOTE — PLAN OF CARE
02/02/24 1341   Final Note   Assessment Type Final Discharge Note   Anticipated Discharge Disposition Home   What phone number can be called within the next 1-3 days to see how you are doing after discharge? [phone]   Hospital Resources/Appts/Education Provided Provided patient/caregiver with written discharge plan information;Provided education on problems/symptoms using teachback   Post-Acute Status   Discharge Delays None known at this time     Patient to discharge home on home O2, self care. Interventional Cardiology will follow up with patient .     Shu Ramos RN    [phone]    Future Appointments   Date Time Provider Department Center   2/5/2024  8:40 AM LAB, APPOINTMENT [place] [facility]   2/5/2024  9:15 AM CPX [facility] ECHOSTR [facility]   2/7/2024  9:00 AM Kole Sharpe MD [facility]   2/8/2024  8:15 AM REHAB, CARDIAC [facility] [place]   2/9/2024 10:30 AM LAB, APPOINTMENT [facility] LAB [facility]   2/9/2024 11:00 AM Marisa Quijano PA-C [facility]   2/9/2024 11:00 AM [facility] HEART FAILURE NURSE [facility]   3/11/2024 12:30 PM EKG, APPT [facility] EKG [facility]   3/11/2024  1:00 PM Hilary Dennis, NP [facility] ARRHYTH [facility]

## 2024-02-02 NOTE — PLAN OF CARE
AAOX4,VS Stable,O2 sats>92% on room air( see flowsheet).BAV successful 2/1/24, Patient ready for discharge.R groin site tender to touch d/t excessive pressure applied. No bleeding, discharge,redness, bruising or hematoma noted to site.Plan of care discussed with patient.Patient educated on fluid restriction and proper calculation of fluids.Patient has no complaints of pain or than tenderness noted to groin site. No SOB, dizziness or chest pain. Discussed medications and care. Patient has no questions at this time.Pt visualised and stable.Call light within reach.Pt resting,bed at lowest position.Pt's spouse by the bedside.No farther orders at this moment.Plan of care ongoing.      Problem: Adult Inpatient Plan of Care  Goal: Plan of Care Review  Outcome: Met  Goal: Patient-Specific Goal (Individualized)  Outcome: Met  Goal: Absence of Hospital-Acquired Illness or Injury  Outcome: Met  Goal: Optimal Comfort and Wellbeing  Outcome: Met  Goal: Readiness for Transition of Care  Outcome: Met     Problem: Diabetes Comorbidity  Goal: Blood Glucose Level Within Targeted Range  Outcome: Met     Problem: Fall Injury Risk  Goal: Absence of Fall and Fall-Related Injury  Outcome: Met     Problem: Skin Injury Risk Increased  Goal: Skin Health and Integrity  Outcome: Met     Problem: Violence Risk or Actual  Goal: Anger and Impulse Control  Outcome: Met

## 2024-02-02 NOTE — PLAN OF CARE
CHW met with patient/family at bedside. Patient experience rounding completed and reviewed the following.     Do you know your discharge plan? Yes or No,    If yes, what is the plan? (Home, Home Health, Rehab, SNF, LTAC, or Other) Yes Home    Have you discussed your needs and preferences with your SW/CM? Yes or No Yes Home    If you are discharging home, do you have help at home? Yes or No Yes (family)    Do you think you will need help additional at home at discharge? Yes or No  No    Do you currently have difficulty keeping up with bills, affording medicine or buying food? Yes or No No    Assigned SW/CM notified of any patient/family needs or concerns. Appropriate resources provided to address patient's needs.  Shu QUINTANILLA scheduled hospital f/u appointment 02/07 at 9:00am  Patient confirms he sees Wilma Mcgarry at [facility] (PCP)      Bella QUINTANILLA  Case Management  [phone]

## 2024-02-02 NOTE — NURSING
Nurses Note -- 4 Eyes      2/2/2024   2:39 PM      Skin assessed during: Daily Assessment      [x] No Altered Skin Integrity Present    []Prevention Measures Documented      [] Yes- Altered Skin Integrity Present or Discovered   [] LDA Added if Not in Epic (Describe Wound)   [] New Altered Skin Integrity was Present on Admit and Documented in LDA   [] Wound Image Taken    Wound Care Consulted? No    Attending Nurse:  Maureen Parra RN/Staff Member:  JOSE Gerenberg

## 2024-02-03 NOTE — DISCHARGE SUMMARY
Eric Bañuelos - Cardiology [facility] Medicine  Discharge Summary      Patient Name: Tej Purdy  MRN: [id number]  NORMA: [id number]  Patient Class: IP- Inpatient  Admission Date: 1/27/2024  Hospital Length of Stay: 6 days  Discharge Date and Time:  02/02/2024 7:21 PM  Attending Physician: Kitty att. providers found   Discharging Provider: Elizabet Trejo DO  Primary Care Provider: Thais Maxwell MD  [facility] Medicine Team: [facility] Elizabet Trejo DO  Primary Care Team: [facility]    HPI:   Tej Purdy is a 68 year old man with hx of CASTILLO cirrhosis, cholangiocarcinoma s/p liver transplant in 2022 on tacrolimus, type 2 diabetes mellitus on insulin, hypertension, coronary artery disease s/p LHC +PCI (12/23), severe aortic stenosis, heart failure reduced ejection fraction with LVEF of 20-25%, persistent atrial fibrillation s/p several cardioversions on apixaban, peripheral arterial disease who presents with recurrent shortness of breath and chest tightness. Recently admitted to  on 01/25 for acute hypoxic respiratory failure in the setting of acute on chronic heart failure exacerbation likely due to recurrent atrial fibrillation for which he is now s/p ASHISH + successful cardioversion with IC on 01/26. He was diuresed with furosemide 40 IV BID during this brief hospital admission with appropriate response and was discharged the evening of 01/26 after improvement in his symptoms.     While in the ED: patient found to be hypoxic w SpO2 87% on RA upon arrival in the ED with improvement after 2L NC, otherwise HDS. EKG without ST elevation. BNP >962. Trop negative. CXR with cardiomegaly with increased pulmonary venous pressure likely pulmonary edema/CHF, grossly unchanged from prior.     Admitted to  for acute hypoxic respiratory failure in the setting of acute on chronic congestive heart failure secondary to volume overload.     Procedure(s) (LRB):  REPAIR, AORTIC VALVE (N/A)      Hospital Course:    Pt  readmitted for acute hypoxic respiratory failure due to acute on chronic heart failure.  Underwent johny/cardioversion on 1/26 with dramatic improvement in symptoms and resolution of hypoxia.  Patient was discharged home that evening.  However, dyspnea and hypoxia returned and patient was readmitted. Initiated heart failure pathway.  Initially treated with IV Lasix.  Now euvolemic and transitioned to Lasix 40 mg p.o. daily.  TTE showed probable severe aortic stenosis and LVEF of 20-25%.  Discontinued midodrine.  Optimizing GDMT for treatment of heart failure reduced ejection fraction.  Started empagliflozin and Entresto.  Continues on home metoprolol succinate.  Given current blood pressure, it does not appear that he will tolerate any additional medications or dose titration.  Underwent dobutamine stress echo on 01/30 consistent with severe aortic stenosis. Following dobutamine infusion valve area found to be 0.5 cm2 with peak velocity of 4.1 m/sec and mean gradient of 42 mm of Hg.  Undergoing evaluation by Interventional Cardiology for balloon aortic valvuloplasty as bridge to TAVR or surgical AVR.  Patient would likely benefit from cardiac rehab.  Will need evaluation for possible AICD, maybe cardiac resynchronization therapy at some point in the next several months.  Patient will need to be discharged with supplemental O2.      Goals of Care Treatment Preferences:  Code Status: Full Code    BP (!) 117/58 (Patient Position: Lying)   Pulse 72   Temp 98 °F (36.7 °C) (Oral)   Resp 18   Ht 6' (1.829 m)   Wt 93.1 kg (205 lb 4 oz)   SpO2 96%   BMI 27.84 kg/m²      Physical Exam  Vitals and nursing note reviewed.   Constitutional:       General: He is not in acute distress.  HENT:      Head: Normocephalic and atraumatic.   Eyes:      General: No scleral icterus.     Pupils: Pupils are equal, round, and reactive to light.   Cardiovascular:      Rate and Rhythm: Normal rate.      Pulses: Normal pulses.       Heart sounds: Murmur (systolic) heard.   Pulmonary:      Effort: Pulmonary effort is normal. No respiratory distress.      Breath sounds: Rales present. No wheezing.   Abdominal:      General: Bowel sounds are normal. There is no distension.      Palpations: Abdomen is soft.      Tenderness: There is no abdominal tenderness. There is no guarding.   Musculoskeletal:      Cervical back: Neck supple.      Right lower leg: No edema.      Left lower leg: No edema.   Skin:     General: Skin is warm and dry.   Neurological:      General: No focal deficit present.      Mental Status: He is alert.    Consults:   Consults (From admission, onward)          Status Ordering Provider     Inpatient consult to Interventional Cardiology  Once        Provider:  (Not yet assigned)    Completed DEBORAH DICKINSON     Inpatient consult to Cardiology  Once        Provider:  (Not yet assigned)    Completed TULIO WALSH     Inpatient consult to Registered Dietitian/Nutritionist  Once        Provider:  (Not yet assigned)    Completed SILAS PENALZOA            No new Assessment & Plan notes have been filed under this hospital service since the last note was generated.  Service: Hospital Medicine    Final Active Diagnoses:    Diagnosis Date Noted POA    PRINCIPAL PROBLEM:  Acute on chronic heart failure with reduced ejection fraction (HFrEF) [I50.23] 01/27/2024 Yes    Iron deficiency anemia [D50.9] 02/01/2024 Yes    Acute hypoxic respiratory failure [J96.01] 01/27/2024 Yes    History of liver transplant [Z94.4] 01/27/2024 Not Applicable    Aortic stenosis [I35.0]  Yes    Persistent atrial fibrillation [I48.19] 05/14/2021 Yes     Chronic    Coronary artery disease involving native coronary artery of native heart without angina pectoris [I25.10] 01/25/2021 Yes     Chronic    Type 2 diabetes mellitus, with long-term current use of insulin [E11.9, Z79.4] 01/19/2021 Not Applicable     Chronic    Primary hypertension [I10] 11/23/2020 Yes      Chronic    Hyperlipidemia [E78.5] 11/23/2020 Yes      Problems Resolved During this Admission:       Discharged Condition: good    Disposition: Home or Self Care    Follow Up:   Follow-up Information       Shilo Carrasco MD Follow up in 2 week(s).    Specialties: Interventional Cardiology, Cardiology  Contact information:  [facility] [facility] [id number] [phone]               Thais Maxwell MD Follow up in 1 week(s).    Specialties: Transplant, Hepatology  Contact information:  [address] [facility] [id number] [phone]                           Patient Instructions:      OXYGEN FOR HOME USE     Order Specific Question Answer Comments   Liter Flow 2 0-5   Duration Continuous    Qualifying Test Performed at: Rest    Oxygen saturation: 86    Portable mode: continuous    Route nasal cannula    Device: home concentrator with portable tanks    Length of need (in months): 99 mos    Patient condition with qualifying saturation CHF    Height: 6' (1.829 m)    Weight: 93 kg (205 lb)    Alternative treatment measures have been tried or considered and deemed clinically ineffective. Yes      Ambulatory referral/consult to Cardiac Rehab   Standing Status: Future   Referral Priority: Routine Referral Type: Consultation   Referral Reason: Specialty Services Required   Requested Specialty: Cardiac Rehabilitation   Number of Visits Requested: 1     Ambulatory referral/consult to Interventional Cardiology   Standing Status: Future   Referral Priority: Routine Referral Type: Consultation   Referral Reason: Specialty Services Required   Requested Specialty: Interventional Cardiology   Number of Visits Requested: 1     Diet diabetic     Diet Cardiac     Notify your health care provider if you experience any of the following:  temperature >100.4     Notify your health care provider if you experience any of the following:  difficulty breathing or increased cough     Notify your health care provider if  "you experience any of the following:  persistent dizziness, light-headedness, or visual disturbances     Notify your health care provider if you experience any of the following:  increased confusion or weakness     Activity as tolerated       Significant Diagnostic Studies: N/A    Pending Diagnostic Studies:       None           Medications:  Reconciled Home Medications:      Medication List        START taking these medications      ENTRESTO 24-26 mg per tablet  Generic drug: sacubitriL-valsartan  Take 1 tablet by mouth 2 (two) times daily.     FeroSuL 325 mg (65 mg iron) Tab tablet  Generic drug: ferrous sulfate  Take 1 tablet (325 mg total) by mouth once daily.     furosemide 40 MG tablet  Commonly known as: LASIX  Take 1 tablet (40 mg total) by mouth once daily.     JARDIANCE 10 mg tablet  Generic drug: empagliflozin  Take 1 tablet (10 mg total) by mouth once daily.            CHANGE how you take these medications      LANTUS SOLOSTAR U-100 INSULIN glargine 100 units/mL SubQ pen  Generic drug: insulin  INJECT 22 UNITS INTO THE SKIN EVERY EVENING. ADJUST DOSE UNTIL AM GLUCOSE GOAL . MAX DOSE 30 UNITS/DY  What changed: See the new instructions.            CONTINUE taking these medications      atorvastatin 80 MG tablet  Commonly known as: LIPITOR  Take 1 tablet (80 mg total) by mouth once daily.     BD ULTRA-FINE GÉNESIS PEN NEEDLE 32 gauge x 5/32" Ndle  Generic drug: pen needle, diabetic  Use to inject insulin 4 times daily     blood sugar diagnostic Strp  To check BG 2 times daily, to use with insurance preferred meter (patient has a TrueMetrix self-monitoring glucose meter)     blood-glucose meter kit  To check BG 2 times daily, to use with insurance preferred meter     buPROPion 150 MG TB24 tablet  Commonly known as: WELLBUTRIN XL  Take 1 tablet (150 mg total) by mouth every morning.     clopidogreL 75 mg tablet  Commonly known as: PLAVIX  Take 1 tablet (75 mg total) by mouth once daily.     ELIQUIS 5 mg " Tab  Generic drug: apixaban  Take 1 tablet (5 mg total) by mouth 2 (two) times daily.     fish oil-omega-3 fatty acids 300-1,000 mg capsule  Take 1 capsule by mouth once daily.     HumaLOG KwikPen Insulin 100 unit/mL pen  Generic drug: insulin lispro  Inject 6 Units into the skin 3 (three) times daily with meals. Plus sliding scale, MDD: 48 units     lancets Misc  To check BG 2 times daily, to use with insurance preferred meter     metoprolol succinate 25 MG 24 hr tablet  Commonly known as: TOPROL-XL  Take 1 tablet (25 mg total) by mouth once daily.     multivitamin capsule  Take 1 capsule by mouth once daily.     pantoprazole 40 MG tablet  Commonly known as: PROTONIX  Take 1 tablet (40 mg total) by mouth once daily.     QUEtiapine 100 MG Tab  Commonly known as: SEROQUEL  Take 1 tablet (100 mg total) by mouth every evening.     tacrolimus 0.5 MG Cap  Commonly known as: PROGRAF  Take 1 capsule (0.5 mg total) by mouth every 12 (twelve) hours.            STOP taking these medications      midodrine 10 MG tablet  Commonly known as: PROAMATINE              Indwelling Lines/Drains at time of discharge:   Lines/Drains/Airways       None                   Time spent on the discharge of patient: 35 minutes         Elizabet Trejo DO  Department of Hospital Medicine  [facility] - Cardiology Stepdown

## 2024-02-05 ENCOUNTER — TELEPHONE (OUTPATIENT)
Dept: CARDIOLOGY | Facility: CLINIC | Age: 69
End: 2024-02-05
Payer: MEDICARE

## 2024-02-05 ENCOUNTER — PATIENT MESSAGE (OUTPATIENT)
Dept: TRANSPLANT | Facility: CLINIC | Age: 69
End: 2024-02-05
Payer: MEDICARE

## 2024-02-05 NOTE — TELEPHONE ENCOUNTER
"[facility]) hospital discharge 48-72 hour phone follow up completed.     Most Recent Hospital Discharge Date: January 2, 2024  Last admission Diagnosis/chief complaint:SOB, BLEE    TCC RN Navigator spoke with pt, Mr Burnham    Current Patient reported weight: pt not compliant with daily weights    Current Patient reported blood pressure and heart rate: pt non compliant with daily VS monitoring    Pt reports the following:  []  Shortness of Breath with Activity  []  Shortness of Breath at rest   []  Fatigue  []  Edema   [] Chest pain or tightness  [] Weight Increase since discharge  [x] None of the above    Medications:   Discharge medication reviewed with pt.  Pt reports having medication list available and has all medications at home for use per list.   Pt denies questions/ needs r/t to medication at this time.     Education:   Confirmed pt received "Home Care Guide for Heart Failure Patients" while admitted.   Reviewed key points as listed below.     Recommend 2 -3 gram sodium restriction and 1500 cc-2000 cc fluid restriction.  Encourage physical activity with graded exercise program.  Requested patient to weigh themselves daily, and to notify us if their weight increases by more than 3 lbs in 1 day or 5 lbs in 3 days.   Reminded patient to use "Daily weight and symptom tracker" to record and guide patient on when and how to call HFT[facility]. PT may also use symptom tracker if no scale available  Pt reports being in the GREEN Zone. If in yellow/red, reminded that they should be calling HFT[facility] today or now.     Watch for these Signs and Symptoms: If any of these occur, contact HFT[facility] immediately:   Increase in shortness of breath with movement   Increase in swelling in your legs and ankles   Weight gain of more than 3 pounds in a day or 5 pounds in 3 days.   Difficulty breathing when you are lying down   Worsening fatigue or tiredness   Stomach bloating, a full feeling or a loss of " appetite   Increased coughing--especially when you are lying down      Pt was able to verbalize back to RN in their own words correct diet/fluid restrictions, necessity for exercise, warning signs and symptoms, when and how to contact their TCC team.      Pt educated on follow-up plan while in HFT[facility] program to include:   Week 1 -  F/u appt with Provider and RN (Date)  FEBRUARY 9, 2024 AT 11 AM   Week 2-5 - In person/ Virtual/ phone call check ins    Week 5-7 - Pt will discharge from H[facility] and transition to longterm care provider (Cardiology/PCP/ Advanced Heart Failure).      Patient active on myChart? Yes      Pt given the following contact information for ease of communication: [phone] (Mon-Fri, 8a-5p) & for urgent issues on the weekend to page the Heart Transplant MD on call.  Pt also encouraged utilize [facility] messaging as well.      Will follow up with pt at first clinic visit and RN navigator available for pt questions, issues or concerns.

## 2024-02-06 ENCOUNTER — PATIENT MESSAGE (OUTPATIENT)
Dept: TRANSPLANT | Facility: CLINIC | Age: 69
End: 2024-02-06
Payer: MEDICARE

## 2024-02-09 ENCOUNTER — TELEPHONE (OUTPATIENT)
Dept: CARDIOLOGY | Facility: CLINIC | Age: 69
End: 2024-02-09

## 2024-02-09 ENCOUNTER — LAB VISIT (OUTPATIENT)
Dept: LAB | Facility: HOSPITAL | Age: 69
End: 2024-02-09
Payer: MEDICARE

## 2024-02-09 ENCOUNTER — OFFICE VISIT (OUTPATIENT)
Dept: CARDIOLOGY | Facility: CLINIC | Age: 69
End: 2024-02-09
Payer: MEDICARE

## 2024-02-09 VITALS
HEART RATE: 78 BPM | BODY MASS INDEX: 26.2 KG/M2 | HEIGHT: 72 IN | WEIGHT: 193.44 LBS | SYSTOLIC BLOOD PRESSURE: 98 MMHG | DIASTOLIC BLOOD PRESSURE: 59 MMHG | OXYGEN SATURATION: 98 %

## 2024-02-09 DIAGNOSIS — E78.5 HYPERLIPIDEMIA, UNSPECIFIED HYPERLIPIDEMIA TYPE: ICD-10-CM

## 2024-02-09 DIAGNOSIS — I10 PRIMARY HYPERTENSION: Chronic | ICD-10-CM

## 2024-02-09 DIAGNOSIS — R06.02 SOB (SHORTNESS OF BREATH): ICD-10-CM

## 2024-02-09 DIAGNOSIS — I48.19 PERSISTENT ATRIAL FIBRILLATION: Chronic | ICD-10-CM

## 2024-02-09 DIAGNOSIS — I35.0 AORTIC VALVE STENOSIS, ETIOLOGY OF CARDIAC VALVE DISEASE UNSPECIFIED: ICD-10-CM

## 2024-02-09 DIAGNOSIS — E11.21 TYPE 2 DIABETES MELLITUS WITH DIABETIC NEPHROPATHY, WITH LONG-TERM CURRENT USE OF INSULIN: Chronic | ICD-10-CM

## 2024-02-09 DIAGNOSIS — Z94.4 S/P LIVER TRANSPLANT: ICD-10-CM

## 2024-02-09 DIAGNOSIS — Z79.01 CURRENT USE OF ANTICOAGULANT THERAPY: ICD-10-CM

## 2024-02-09 DIAGNOSIS — I38 VALVULAR HEART DISEASE: ICD-10-CM

## 2024-02-09 DIAGNOSIS — I25.10 CORONARY ARTERY DISEASE INVOLVING NATIVE CORONARY ARTERY OF NATIVE HEART WITHOUT ANGINA PECTORIS: Chronic | ICD-10-CM

## 2024-02-09 DIAGNOSIS — Z79.4 TYPE 2 DIABETES MELLITUS WITH DIABETIC NEPHROPATHY, WITH LONG-TERM CURRENT USE OF INSULIN: Chronic | ICD-10-CM

## 2024-02-09 DIAGNOSIS — I50.20 HFREF (HEART FAILURE WITH REDUCED EJECTION FRACTION): Primary | ICD-10-CM

## 2024-02-09 LAB
ALBUMIN SERPL BCP-MCNC: 3.4 G/DL (ref 3.5–5.2)
ALBUMIN SERPL BCP-MCNC: 3.4 G/DL (ref 3.5–5.2)
ALP SERPL-CCNC: 172 U/L (ref 55–135)
ALP SERPL-CCNC: 172 U/L (ref 55–135)
ALT SERPL W/O P-5'-P-CCNC: 32 U/L (ref 10–44)
ALT SERPL W/O P-5'-P-CCNC: 32 U/L (ref 10–44)
ANION GAP SERPL CALC-SCNC: 9 MMOL/L (ref 8–16)
ANION GAP SERPL CALC-SCNC: 9 MMOL/L (ref 8–16)
AST SERPL-CCNC: 20 U/L (ref 10–40)
AST SERPL-CCNC: 20 U/L (ref 10–40)
BASOPHILS # BLD AUTO: 0.06 K/UL (ref 0–0.2)
BASOPHILS # BLD AUTO: 0.06 K/UL (ref 0–0.2)
BASOPHILS NFR BLD: 0.8 % (ref 0–1.9)
BASOPHILS NFR BLD: 0.8 % (ref 0–1.9)
BILIRUB SERPL-MCNC: 0.6 MG/DL (ref 0.1–1)
BILIRUB SERPL-MCNC: 0.6 MG/DL (ref 0.1–1)
BNP SERPL-MCNC: 601 PG/ML (ref 0–99)
BUN SERPL-MCNC: 26 MG/DL (ref 8–23)
BUN SERPL-MCNC: 26 MG/DL (ref 8–23)
CALCIUM SERPL-MCNC: 9.3 MG/DL (ref 8.7–10.5)
CALCIUM SERPL-MCNC: 9.3 MG/DL (ref 8.7–10.5)
CHLORIDE SERPL-SCNC: 105 MMOL/L (ref 95–110)
CHLORIDE SERPL-SCNC: 105 MMOL/L (ref 95–110)
CO2 SERPL-SCNC: 26 MMOL/L (ref 23–29)
CO2 SERPL-SCNC: 26 MMOL/L (ref 23–29)
CREAT SERPL-MCNC: 1.4 MG/DL (ref 0.5–1.4)
CREAT SERPL-MCNC: 1.4 MG/DL (ref 0.5–1.4)
DIFFERENTIAL METHOD BLD: ABNORMAL
DIFFERENTIAL METHOD BLD: ABNORMAL
EOSINOPHIL # BLD AUTO: 0.1 K/UL (ref 0–0.5)
EOSINOPHIL # BLD AUTO: 0.1 K/UL (ref 0–0.5)
EOSINOPHIL NFR BLD: 1.6 % (ref 0–8)
EOSINOPHIL NFR BLD: 1.6 % (ref 0–8)
ERYTHROCYTE [DISTWIDTH] IN BLOOD BY AUTOMATED COUNT: 21.2 % (ref 11.5–14.5)
ERYTHROCYTE [DISTWIDTH] IN BLOOD BY AUTOMATED COUNT: 21.2 % (ref 11.5–14.5)
EST. GFR  (NO RACE VARIABLE): 54.1 ML/MIN/1.73 M^2
EST. GFR  (NO RACE VARIABLE): 54.1 ML/MIN/1.73 M^2
GLUCOSE SERPL-MCNC: 217 MG/DL (ref 70–110)
GLUCOSE SERPL-MCNC: 217 MG/DL (ref 70–110)
HCT VFR BLD AUTO: 39 % (ref 40–54)
HCT VFR BLD AUTO: 39 % (ref 40–54)
HGB BLD-MCNC: 10.3 G/DL (ref 14–18)
HGB BLD-MCNC: 10.3 G/DL (ref 14–18)
IMM GRANULOCYTES # BLD AUTO: 0.03 K/UL (ref 0–0.04)
IMM GRANULOCYTES # BLD AUTO: 0.03 K/UL (ref 0–0.04)
IMM GRANULOCYTES NFR BLD AUTO: 0.4 % (ref 0–0.5)
IMM GRANULOCYTES NFR BLD AUTO: 0.4 % (ref 0–0.5)
LYMPHOCYTES # BLD AUTO: 1.2 K/UL (ref 1–4.8)
LYMPHOCYTES # BLD AUTO: 1.2 K/UL (ref 1–4.8)
LYMPHOCYTES NFR BLD: 15.8 % (ref 18–48)
LYMPHOCYTES NFR BLD: 15.8 % (ref 18–48)
MAGNESIUM SERPL-MCNC: 2.1 MG/DL (ref 1.6–2.6)
MAGNESIUM SERPL-MCNC: 2.1 MG/DL (ref 1.6–2.6)
MCH RBC QN AUTO: 19 PG (ref 27–31)
MCH RBC QN AUTO: 19 PG (ref 27–31)
MCHC RBC AUTO-ENTMCNC: 26.4 G/DL (ref 32–36)
MCHC RBC AUTO-ENTMCNC: 26.4 G/DL (ref 32–36)
MCV RBC AUTO: 72 FL (ref 82–98)
MCV RBC AUTO: 72 FL (ref 82–98)
MONOCYTES # BLD AUTO: 1 K/UL (ref 0.3–1)
MONOCYTES # BLD AUTO: 1 K/UL (ref 0.3–1)
MONOCYTES NFR BLD: 13 % (ref 4–15)
MONOCYTES NFR BLD: 13 % (ref 4–15)
NEUTROPHILS # BLD AUTO: 5.1 K/UL (ref 1.8–7.7)
NEUTROPHILS # BLD AUTO: 5.1 K/UL (ref 1.8–7.7)
NEUTROPHILS NFR BLD: 68.4 % (ref 38–73)
NEUTROPHILS NFR BLD: 68.4 % (ref 38–73)
NRBC BLD-RTO: 0 /100 WBC
NRBC BLD-RTO: 0 /100 WBC
PHOSPHATE SERPL-MCNC: 3.3 MG/DL (ref 2.7–4.5)
PLATELET # BLD AUTO: 233 K/UL (ref 150–450)
PLATELET # BLD AUTO: 233 K/UL (ref 150–450)
PMV BLD AUTO: 10.2 FL (ref 9.2–12.9)
PMV BLD AUTO: 10.2 FL (ref 9.2–12.9)
POTASSIUM SERPL-SCNC: 4.3 MMOL/L (ref 3.5–5.1)
POTASSIUM SERPL-SCNC: 4.3 MMOL/L (ref 3.5–5.1)
PROT SERPL-MCNC: 7 G/DL (ref 6–8.4)
PROT SERPL-MCNC: 7 G/DL (ref 6–8.4)
RBC # BLD AUTO: 5.41 M/UL (ref 4.6–6.2)
RBC # BLD AUTO: 5.41 M/UL (ref 4.6–6.2)
SODIUM SERPL-SCNC: 140 MMOL/L (ref 136–145)
SODIUM SERPL-SCNC: 140 MMOL/L (ref 136–145)
TACROLIMUS BLD-MCNC: 4.5 NG/ML (ref 5–15)
WBC # BLD AUTO: 7.47 K/UL (ref 3.9–12.7)
WBC # BLD AUTO: 7.47 K/UL (ref 3.9–12.7)

## 2024-02-09 PROCEDURE — 99214 OFFICE O/P EST MOD 30 MIN: CPT | Mod: S$GLB,,,

## 2024-02-09 PROCEDURE — 99999 PR PBB SHADOW E&M-EST. PATIENT-LVL V: CPT | Mod: PBBFAC,,,

## 2024-02-09 PROCEDURE — 83735 ASSAY OF MAGNESIUM: CPT | Mod: 91 | Performed by: INTERNAL MEDICINE

## 2024-02-09 PROCEDURE — 84100 ASSAY OF PHOSPHORUS: CPT

## 2024-02-09 PROCEDURE — 36415 COLL VENOUS BLD VENIPUNCTURE: CPT

## 2024-02-09 PROCEDURE — 83735 ASSAY OF MAGNESIUM: CPT

## 2024-02-09 PROCEDURE — 80053 COMPREHEN METABOLIC PANEL: CPT | Performed by: INTERNAL MEDICINE

## 2024-02-09 PROCEDURE — 80197 ASSAY OF TACROLIMUS: CPT | Performed by: INTERNAL MEDICINE

## 2024-02-09 PROCEDURE — 85025 COMPLETE CBC W/AUTO DIFF WBC: CPT | Performed by: INTERNAL MEDICINE

## 2024-02-09 PROCEDURE — 83880 ASSAY OF NATRIURETIC PEPTIDE: CPT

## 2024-02-09 RX ORDER — FUROSEMIDE 40 MG/1
20 TABLET ORAL DAILY
Qty: 30 TABLET | Refills: 6
Start: 2024-02-09 | End: 2024-02-16

## 2024-02-09 NOTE — PATIENT INSTRUCTIONS
Will call today with lab results and further lasix instructions.    Tentative plan will be to decrease lasix to 20mg daily.    Notify us ([phone]) with any worsening shortness of breath, swelling, or 2-3lb weight gain in 1 day/5lb weight gain in 1 week.

## 2024-02-09 NOTE — PROGRESS NOTES
[facility] Provider Note (Initial Clinic) Consult Note    Date of original referral: 1/29/24  Age: 68 y.o.  Gender: male  Ethnicity: White  Number of admissions for CHF within the preceding year: 2   Type of Congestive Heart Failure: Combined   Etiology: Valvular   Enrolled in Infusion suite: no    Diagnostic Labs:   EKG - 02/01/2024  CXR - 01/27/2024  ECHO - 01/30/2024  Stress test -   Stress echo -   Pharmacologic stress -   Cardiac catheterization - 02/01/2024   Cardiac MRI -     Lab Results   Component Value Date     02/02/2024     02/02/2024    K 3.9 02/02/2024    K 4.2 02/02/2024     02/02/2024     02/02/2024    CO2 26 02/02/2024    CO2 23 02/02/2024     (H) 02/02/2024     (H) 02/02/2024    BUN 36 (H) 02/02/2024    BUN 38 (H) 02/02/2024    CREATININE 1.3 02/02/2024    CREATININE 1.4 02/02/2024    CALCIUM 9.2 02/02/2024    CALCIUM 9.0 02/02/2024    PROT 6.7 02/02/2024    PROT 6.5 02/02/2024    ALBUMIN 3.4 (L) 02/02/2024    ALBUMIN 3.3 (L) 02/02/2024    BILITOT 0.7 02/02/2024    BILITOT 0.7 02/02/2024    ALKPHOS 100 02/02/2024    ALKPHOS 92 02/02/2024    AST 18 02/02/2024    AST 24 02/02/2024    ALT 14 02/02/2024    ALT 14 02/02/2024    ANIONGAP 7 (L) 02/02/2024    ANIONGAP 11 02/02/2024    ESTGFRAFRICA >60.0 07/18/2022    ESTGFRAFRICA >60.0 07/06/2022    EGFRNONAA >60.0 07/18/2022    EGFRNONAA >60.0 07/06/2022       Lab Results   Component Value Date    WBC 8.72 02/02/2024    WBC 8.54 02/01/2024    RBC 4.73 02/02/2024    RBC 5.15 02/01/2024    HGB 9.1 (L) 02/02/2024    HGB 9.9 (L) 02/01/2024    HCT 33.4 (L) 02/02/2024    HCT 36.5 (L) 02/01/2024    MCV 71 (L) 02/02/2024    MCV 71 (L) 02/01/2024    MCH 19.2 (L) 02/02/2024    MCH 19.2 (L) 02/01/2024    MCHC 27.2 (L) 02/02/2024    MCHC 27.1 (L) 02/01/2024    RDW 20.7 (H) 02/02/2024    RDW 20.8 (H) 02/01/2024     02/02/2024     02/01/2024    MPV 11.9 02/02/2024    MPV 10.8 02/01/2024    IMMGR 0.3 02/02/2024    IMMGR  0.5 02/01/2024    IGABS 0.03 02/02/2024    IGABS 0.04 02/01/2024    LYMPH 1.0 02/02/2024    LYMPH 11.9 (L) 02/02/2024    MONO 1.2 (H) 02/02/2024    MONO 13.5 02/02/2024    EOS 0.1 02/02/2024    EOS 0.2 02/01/2024    BASO 0.06 02/02/2024    BASO 0.06 02/01/2024    NRBC 0 02/02/2024    NRBC 0 02/01/2024    GRAN 6.3 02/02/2024    GRAN 72.1 02/02/2024    EOSINOPHIL 1.5 02/02/2024    EOSINOPHIL 2.5 02/01/2024    BASOPHIL 0.7 02/02/2024    BASOPHIL 0.7 02/01/2024    PLTEST Appears normal 11/13/2023    PLTEST Decreased (A) 09/06/2023    ANISO Slight 12/28/2023    ANISO Slight 11/13/2023    HYPO Occasional 11/13/2023    HYPO Occasional 06/19/2023       Lab Results   Component Value Date     (H) 01/27/2024     (H) 01/25/2024    MG 1.6 02/02/2024    MG 1.6 02/01/2024    PHOS 3.7 02/02/2024    PHOS 4.4 02/01/2024    TROPONINI 0.022 01/27/2024    TROPONINI 0.016 01/25/2024    HGBA1C 7.5 (H) 01/26/2024    HGBA1C 6.5 (H) 12/23/2023    TSH 2.223 12/22/2023    TSH 1.612 08/23/2023    FREET4 0.96 02/04/2022       Lab Results   Component Value Date    IRON 18 (L) 12/23/2023    TIBC 511 (H) 12/23/2023    FERRITIN 15 (L) 12/23/2023    CHOL 130 09/19/2022    TRIG 144 09/19/2022    HDL 36 (L) 09/19/2022    LDLCALC 65.2 09/19/2022    CHOLHDL 27.7 09/19/2022    TOTALCHOLEST 3.6 09/19/2022    NONHDLCHOL 94 09/19/2022    COLORU Yellow 01/27/2024    APPEARANCEUA Clear 01/27/2024    PHUR 6.0 01/27/2024    SPECGRAV 1.020 01/27/2024    PROTEINUA Trace (A) 01/27/2024    GLUCUA Trace (A) 01/27/2024    KETONESU Negative 01/27/2024    BILIRUBINUA Negative 01/27/2024    OCCULTUA Negative 01/27/2024    NITRITE Negative 01/27/2024    LEUKOCYTESUR Negative 01/27/2024       No implanted cardiac devices    Current Outpatient Medications on File Prior to Visit   Medication Sig Dispense Refill    apixaban (ELIQUIS) 5 mg Tab Take 1 tablet (5 mg total) by mouth 2 (two) times daily. 60 tablet 3    atorvastatin (LIPITOR) 80 MG tablet Take 1 tablet  (80 mg total) by mouth once daily. 30 tablet 11    blood sugar diagnostic Strp To check BG 2 times daily, to use with insurance preferred meter (patient has a [employer] self-monitoring glucose meter) 100 strip 11    blood-glucose meter kit To check BG 2 times daily, to use with insurance preferred meter 1 each 0    buPROPion (WELLBUTRIN XL) 150 MG TB24 tablet Take 1 tablet (150 mg total) by mouth every morning. 30 tablet 0    clopidogreL (PLAVIX) 75 mg tablet Take 1 tablet (75 mg total) by mouth once daily. 30 tablet 11    empagliflozin (JARDIANCE) 10 mg tablet Take 1 tablet (10 mg total) by mouth once daily. 30 tablet 2    ferrous sulfate (IRON) 325 mg (65 mg iron) Tab tablet Take 1 tablet (325 mg total) by mouth once daily. 30 tablet 2    furosemide (LASIX) 40 MG tablet Take 1 tablet (40 mg total) by mouth once daily. 30 tablet 11    insulin (LANTUS SOLOSTAR U-100 INSULIN) glargine 100 units/mL SubQ pen INJECT 22 UNITS INTO THE SKIN EVERY EVENING. ADJUST DOSE UNTIL AM GLUCOSE GOAL . MAX DOSE 30 UNITS/DY (Patient taking differently: Last documented fill 11/15/23 - consider refill) 15 mL 11    insulin lispro (HUMALOG KWIKPEN INSULIN) 100 unit/mL pen Inject 6 Units into the skin 3 (three) times daily with meals. Plus sliding scale, MDD: 48 units (Patient taking differently: Inject 6 Units into the skin 3 (three) times daily with meals. Plus sliding scale, MDD: 48 units    Last documented fill 9/20/23 - consider refill) 15 mL 11    lancets Misc To check BG 2 times daily, to use with insurance preferred meter 100 each 11    metoprolol succinate (TOPROL-XL) 25 MG 24 hr tablet Take 1 tablet (25 mg total) by mouth once daily. 30 tablet 11    multivitamin capsule Take 1 capsule by mouth once daily.      omega-3 fatty acids/fish oil (FISH OIL-OMEGA-3 FATTY ACIDS) 300-1,000 mg capsule Take 1 capsule by mouth once daily.       pantoprazole (PROTONIX) 40 MG tablet Take 1 tablet (40 mg total) by mouth once daily. 30  "tablet 5    pen needle, diabetic (BD ULTRA-FINE GÉNESIS PEN NEEDLE) 32 gauge x 5/32" Ndle Use to inject insulin 4 times daily 200 each 11    QUEtiapine (SEROQUEL) 100 MG Tab Take 1 tablet (100 mg total) by mouth every evening. 30 tablet 2    sacubitriL-valsartan (ENTRESTO) 24-26 mg per tablet Take 1 tablet by mouth 2 (two) times daily. 60 tablet 2    tacrolimus (PROGRAF) 0.5 MG Cap Take 1 capsule (0.5 mg total) by mouth every 12 (twelve) hours. 60 capsule 11     Current Facility-Administered Medications on File Prior to Visit   Medication Dose Route Frequency Provider Last Rate Last Admin    sodium chloride 0.9% bolus 1,000 mL  1,000 mL Intravenous Once Solange Bill, NP             HPI:  Patient reports breathing okay, denies current SOB with ADL. Does endorse weakness/fatigue worsened over the past 2 days    Patient sleeps on 1 number of pillows behind head   Patient wakes up SOB, has to get out of bed, associated cough- PND symptoms now resolved   Palpitations - denies   Dizzy, light-headed, pre-syncope or syncope- endorses dizziness/lightheadedness over the past few days with episode of pre-syncope this morning. Denies syncope.    Since discharge frequency of performing weights, home weight and weight change- not performing daily weights, but reports was 205lbs prior to admission. 193lbs today on clinic scale   Other information felt pertinent to HPI: Mr. Tej Purdy is a 67 yo male with a PMHx of CASTILLO cirrhosis, cholangiocarcinoma s/p liver transplant in 2022 on tacrolimus, T2DM on insulin, HTN, CAD s/p LHC +PCI (12/23), severe AS, HFrEF with EF of 20-25%, persistent AF s/p several DCCV on apixaban, PAD who presents to first [facility] visit following multiple recent admissions for ADHF. Recently admitted to  on 01/25 for acute hypoxic respiratory failure in the setting of acute on chronic heart failure exacerbation likely due to recurrent atrial fibrillation for which he is now s/p ASHISH + successful cardioversion " with IC on 01/26. He was diuresed with furosemide 40 IV BID during this brief hospital admission with appropriate response and was discharged the evening of 01/26. On most recent admission in ED found to be hypoxic w SpO2 87% on RA upon arrival in the ED with improvement after 2L NC, otherwise HDS. EKG without ST elevation. BNP >962. Trop negative. CXR with cardiomegaly with increased pulmonary venous pressure likely pulmonary edema/CHF. Admitted to  for acute hypoxic respiratory failure in the setting of acute on chronic congestive heart failure. TTE showed severe AS and EF 20-25%. GDMT limited by lower blood pressure. Underwent dobutamine stress echo on 01/30 consistent with severe aortic stenosis. Evaluated by IC  and underwent balloon aortic valvuloplasty as bridge to TAVR or surgical AVR. Will need evaluation for possible AICD, maybe cardiac resynchronization therapy at some point in the next several months. Patient was adequately diuresed and discharged with supplemental O2.       PHYSICAL:   Vitals:    02/09/24 1118   BP: (!) 98/59   Pulse: 78      @[id number](3)@    JVD: no   Heart rhythm: irregular  Cardiac murmur: Yes     S3: no  S4: no  Lungs: clear  Hepatojugular reflux: no  Edema: no      Echo 1/29/24:    Left Ventricle: The left ventricle is mildly dilated. Normal wall thickness. Moderate global hypokinesis present. There is severely reduced systolic function with a visually estimated ejection fraction of 20 - 25%. Unable to assess diastolic function due to atrial fibrillation.    Right Ventricle: Normal right ventricular cavity size. Wall thickness is normal. Right ventricle wall motion  is normal. Systolic function is normal.    Aortic Valve: There is severe aortic valve sclerosis. Severely restricted motion. There is likely severe aortic stenosis. The valve could not be adequately assessed by doppler. Please see prior report.    Pulmonary Artery: The estimated pulmonary artery systolic pressure  is 20 mmHg.    IVC/SVC: Normal venous pressure at 3 mmHg.    Pericardium: There is a small circumferential effusion. There is slightly more fluid along the lateral border of the left ventricle. There is right atrial collapse. Transmital doppler is variable. Overall there is no evidence of tamponade.    ASSESSMENT: HFrEF    PLAN:      Patient Instructions:   Instruct the patient to notify this clinic if HH, a physician or an advanced care provider wants to change medication one of their HF medications   Activity and Diet restrictions:   Recommend 2-3 gram sodium restriction and 1500cc- 2000cc fluid restriction.  Encourage physical activity with graded exercise program.  Requested patient to weigh themselves daily, and to notify us if their weight increases by more than 3 lbs in 1 day or 5 lbs in 3 days.    Assigned dry weight on home scale: unsure, not performing daily weights  Medication changes (include current dose and changed dose): NYHA Class III symptoms. No appreciable fluid volume on exam, down ~13lbs from recent admission with dizziness/lightheadedness/weakness worsened over the past 2 days and episode of pre-syncope this morning. S/p BAV as bridge to possible TAVR. Reduce lasix from 40mg to 20mg daily to avoid overdiuresis. Okay to take 40mg daily prn for noted weight gain or worsening SOB. Otherwise on adequate GDMT: entresto 24/26mg BID, toprol 25mg daily, jardiance 10mg daily. Plan to add aldactone in future as tolerates to further optimize regimen.  Upcoming labs and date anticipated: RTC in 1 week for repeat labs and close follow up  Other diagnostic tests ordered: will need close follow up with IC for possible TAVR    Marisa Quijano PA-C

## 2024-02-09 NOTE — TELEPHONE ENCOUNTER
Call to PT to advise of POC changes at PAC Samm's request, changes as follows;    Can we call and have him reduce lasix to 20mg once daily, okay to take 40mg daily as needed for any worsening shortness of breath, swelling or 3lb weight gain on home scale. Interventional moved up his appointment 1 week to 3/20, and put him on the waitlist. Can we see him back in 1 week for follow up with lasix adjustment?     Message delivered, also advised PT of his F/U appt on 2-16-24 at  10:00 with 0930 labs.    Repeated these instructions twice and PT can read back them to me.

## 2024-02-12 NOTE — PROGRESS NOTES
[facility] Provider Note (Follow-up) Consult Note      HPI:     Patient reports breathing okay, denies current SOB with ADL. Ambulating to clinic today and pace slow, reports distance walked limited by leg pain rather than SOB.              Patient sleeps on 1-2 number of pillows              Patient wakes up SOB, has to get out of bed, associated cough- PND symptoms now resolved              Palpitations - denies              Dizzy, light-headed, pre-syncope or syncope- dizziness/lightheadedness now resolved. Denies pre-syncope/syncope               Since discharge frequency of performing weights, home weight and weight change- not performing daily weights, but reports was 205lbs prior to admission. Weight uptrending, now 215lbs               Other information felt pertinent to HPI: Mr. Tej Purdy is a 69 yo male with a PMHx of CASTILLO cirrhosis, cholangiocarcinoma s/p liver transplant in 2022 on tacrolimus, T2DM on insulin, HTN, CAD s/p LHC +PCI (12/23), severe AS, HFrEF with EF of 20-25%, persistent AF s/p several DCCV on apixaban, PAD who presents to second [facility] visit following multiple recent admissions for ADHF. Recently admitted to  on 01/25 for acute hypoxic respiratory failure in the setting of acute on chronic heart failure exacerbation likely due to recurrent atrial fibrillation for which he is now s/p ASHISH + successful cardioversion with IC on 01/26. He was diuresed with furosemide 40 IV BID during this brief hospital admission with appropriate response and was discharged the evening of 01/26. On most recent admission in ED found to be hypoxic w SpO2 87% on RA upon arrival in the ED with improvement after 2L NC, otherwise HDS. EKG without ST elevation. BNP >962. Trop negative. CXR with cardiomegaly with increased pulmonary venous pressure likely pulmonary edema/CHF. Admitted to  for acute hypoxic respiratory failure in the setting of acute on chronic congestive heart failure. TTE showed severe AS and EF  20-25%. GDMT limited by lower blood pressure. Underwent dobutamine stress echo on 01/30 consistent with severe aortic stenosis. Evaluated by IC  and underwent balloon aortic valvuloplasty as bridge to TAVR or surgical AVR. Will need evaluation for possible AICD, maybe cardiac resynchronization therapy at some point in the next several months. Patient was adequately diuresed and discharged with supplemental O2.      2/16/24: last visit we reduced lasix from 40mg to 20mg daily to avoid overdiuresis. Today he has no acute complaints. Denies appreciable worsening SOB/BLE edema. Dizziness/lightheadedness/pre-syncope resolved.     PHYSICAL:   Vitals:    02/16/24 0951   BP: (!) 100/52   Pulse: (!) 54      @[id number](3)@    JVD: no   Heart rhythm: regular  Cardiac murmur: Yes     S3: no  S4: no  Lungs: clear  Hepatojugular reflux: no  Edema: no      Lab Results   Component Value Date     02/16/2024    K 3.8 02/16/2024    MG 2.4 02/16/2024     02/16/2024    CO2 26 02/16/2024    BUN 22 02/16/2024    CREATININE 1.4 02/16/2024     (H) 02/16/2024    CALCIUM 9.2 02/16/2024    AST 28 02/16/2024    ALT 27 02/16/2024    ALBUMIN 3.4 (L) 02/16/2024    PROT 6.9 02/16/2024    BILITOT 0.4 02/16/2024     Lab Results   Component Value Date     (H) 02/16/2024     (H) 02/09/2024     (H) 01/27/2024       ASSESSMENT: HFrEF     PLAN:      Patient Instructions:   Instruct the patient to notify this clinic if HH, a physician or an advanced care provider wants to change medication one of their HF medications   Activity and Diet restrictions:   Recommend 2-3 gram sodium restriction and 1500cc- 2000cc fluid restriction.  Encourage physical activity with graded exercise program.  Requested patient to weigh themselves daily, and to notify us if their weight increases by more than 3 lbs in 1 day or 5 lbs in 3 days.    Assigned dry weight on home scale: unsure, not performing daily weights  Medication changes  (include current dose and changed dose): NYHA Class III symptoms. Significant weight gain on clinic scale, but appears well compensated on exam and BNP downtrending. Resume lasix 40mg daily over the weekend with plan for phone check in Monday to assess weight trend. S/p BAV as bridge to possible TAVR. Otherwise on adequate GDMT: entresto 24/26mg BID, toprol 25mg daily, jardiance 10mg daily. Plan to add aldactone in future as tolerates to further optimize regimen.    RD education provided during visit.    Upcoming labs and date anticipated: phone check in Monday, RTC in 2 weeks or sooner prn for repeat labs and close follow up    Other diagnostic tests ordered: will need close follow up with IC for possible TAVR     Marisa Quijano PA-C

## 2024-02-14 ENCOUNTER — TELEPHONE (OUTPATIENT)
Dept: CARDIAC REHAB | Facility: CLINIC | Age: 69
End: 2024-02-14
Payer: MEDICARE

## 2024-02-14 ENCOUNTER — OFFICE VISIT (OUTPATIENT)
Dept: PRIMARY CARE CLINIC | Facility: CLINIC | Age: 69
End: 2024-02-14
Payer: MEDICARE

## 2024-02-14 VITALS
HEART RATE: 52 BPM | BODY MASS INDEX: 28.96 KG/M2 | SYSTOLIC BLOOD PRESSURE: 110 MMHG | DIASTOLIC BLOOD PRESSURE: 55 MMHG | OXYGEN SATURATION: 95 % | HEIGHT: 72 IN | WEIGHT: 213.81 LBS | TEMPERATURE: 98 F

## 2024-02-14 DIAGNOSIS — K76.6 PORTAL HYPERTENSION: ICD-10-CM

## 2024-02-14 DIAGNOSIS — K74.69 OTHER CIRRHOSIS OF LIVER: ICD-10-CM

## 2024-02-14 DIAGNOSIS — I50.20 HEART FAILURE WITH REDUCED EJECTION FRACTION: ICD-10-CM

## 2024-02-14 DIAGNOSIS — D84.821 IMMUNODEFICIENCY DUE TO DRUGS: ICD-10-CM

## 2024-02-14 DIAGNOSIS — Z76.82 AWAITING LIVER TRANSPLANT: ICD-10-CM

## 2024-02-14 DIAGNOSIS — Z79.899 IMMUNODEFICIENCY DUE TO DRUGS: ICD-10-CM

## 2024-02-14 DIAGNOSIS — I48.19 PERSISTENT ATRIAL FIBRILLATION: ICD-10-CM

## 2024-02-14 DIAGNOSIS — Z09 HOSPITAL DISCHARGE FOLLOW-UP: Primary | ICD-10-CM

## 2024-02-14 DIAGNOSIS — Z79.4 TYPE 2 DIABETES MELLITUS WITH HYPERGLYCEMIA, WITH LONG-TERM CURRENT USE OF INSULIN: ICD-10-CM

## 2024-02-14 DIAGNOSIS — F32.A DEPRESSION, UNSPECIFIED DEPRESSION TYPE: ICD-10-CM

## 2024-02-14 DIAGNOSIS — E11.65 TYPE 2 DIABETES MELLITUS WITH HYPERGLYCEMIA, WITH LONG-TERM CURRENT USE OF INSULIN: ICD-10-CM

## 2024-02-14 DIAGNOSIS — Z94.4 S/P LIVER TRANSPLANT: Chronic | ICD-10-CM

## 2024-02-14 PROCEDURE — 99999 PR PBB SHADOW E&M-EST. PATIENT-LVL V: CPT | Mod: PBBFAC,,, | Performed by: FAMILY MEDICINE

## 2024-02-14 PROCEDURE — 99214 OFFICE O/P EST MOD 30 MIN: CPT | Mod: S$GLB,,, | Performed by: FAMILY MEDICINE

## 2024-02-14 RX ORDER — PANTOPRAZOLE SODIUM 40 MG/1
40 TABLET, DELAYED RELEASE ORAL DAILY
Qty: 90 TABLET | Refills: 1 | Status: SHIPPED | OUTPATIENT
Start: 2024-02-14

## 2024-02-14 RX ORDER — INSULIN GLARGINE 100 [IU]/ML
22 INJECTION, SOLUTION SUBCUTANEOUS NIGHTLY
Qty: 15 ML | Refills: 11 | Status: SHIPPED | OUTPATIENT
Start: 2024-02-14

## 2024-02-14 RX ORDER — QUETIAPINE FUMARATE 100 MG/1
100 TABLET, FILM COATED ORAL NIGHTLY
Qty: 90 TABLET | Refills: 1 | Status: SHIPPED | OUTPATIENT
Start: 2024-02-14

## 2024-02-14 RX ORDER — METOPROLOL SUCCINATE 25 MG/1
25 TABLET, EXTENDED RELEASE ORAL DAILY
Qty: 90 TABLET | Refills: 1 | Status: SHIPPED | OUTPATIENT
Start: 2024-02-14 | End: 2024-05-07

## 2024-02-14 RX ORDER — INSULIN LISPRO 100 [IU]/ML
6 INJECTION, SOLUTION INTRAVENOUS; SUBCUTANEOUS
Qty: 15 ML | Refills: 5 | Status: SHIPPED | OUTPATIENT
Start: 2024-02-14 | End: 2025-02-13

## 2024-02-14 RX ORDER — BUPROPION HYDROCHLORIDE 150 MG/1
150 TABLET ORAL EVERY MORNING
Qty: 90 TABLET | Refills: 1 | Status: SHIPPED | OUTPATIENT
Start: 2024-02-14

## 2024-02-14 NOTE — TELEPHONE ENCOUNTER
Thank you for the referral for Tej Purdy  to [facility]. However her order is placed incorrectly as an ambulatory referral. Please place correct order in Epic for Cardiac Rehab Phase II (CRR4) since patient is interested we can submit to insurance for authorization. We will call patient after it is approved. Please contact me for further information or questions. Thank you again.  Alexei Burnham RN  Cardiac Rehab Nurse  [phone]

## 2024-02-14 NOTE — PROGRESS NOTES
"      BP (!) 110/55   Pulse (!) 52   Temp 97.7 °F (36.5 °C)   Ht 6' (1.829 m)   Wt 97 kg (213 lb 12.8 oz)   SpO2 95%   BMI 29.00 kg/m²       ===========    Chief Complaint: Hospital Follow Up          HPI    Tej Purdy is a 68 y.o. male     here for    Hosp d/c fu    Dc summary as follows:    "  [facility]OSP MED 2  I have reviewed and concur with the resident's history, physical, assessment, and plan.  I have personally interviewed and examined the patient at bedside.  See below addendum for my evaluation and additional findings.      Mr. Tej Purdy is a 68 y.o. male who is followed for Acute on chronic HFrEF (heart failure with reduced ejection fraction).     -- BAV done for av stenosis as bridge to TAVR  -- given home oxygen                   Active Hospital Problems     Diagnosis   POA    *Acute on chronic heart failure with reduced ejection fraction (HFrEF) [I50.23]   Yes    Iron deficiency anemia [D50.9]   Yes    Acute hypoxic respiratory failure [J96.01]   Yes    History of liver transplant [Z94.4]   Not Applicable    Aortic stenosis [I35.0]   Yes    Persistent atrial fibrillation [I48.19]   Yes       Chronic    Coronary artery disease involving native coronary artery of native heart without angina pectoris [I25.10]   Yes       Chronic    Type 2 diabetes mellitus, with long-term current use of insulin [E11.9, Z79.4]   Not Applicable       Chronic    Primary hypertension [I10]   Yes       Chronic    Hyperlipidemia [E78.5]   Yes       Resolved Hospital Problems   No resolved problems to display.                     Eric Bañuelos - Cardiology [facility] Medicine  Discharge Summary        Patient Name: Tej Purdy  MRN: [id number]  NORMA: [id number]  Patient Class: IP- Inpatient  Admission Date: 1/27/2024  Hospital Length of Stay: 6 days  Discharge Date and Time:  02/02/2024 7:21 PM  Attending Physician: No att. providers found   Discharging Provider: Elizabet Trejo DO  Primary Care Provider: " Thais Maxwell MD  Hospital Medicine Team: [facility] 2 Elizabet Trejo DO  Primary Care Team: [facility] 2     HPI:   Tej Purdy is a 68 year old man with hx of CASTILLO cirrhosis, cholangiocarcinoma s/p liver transplant in 2022 on tacrolimus, type 2 diabetes mellitus on insulin, hypertension, coronary artery disease s/p LHC +PCI (12/23), severe aortic stenosis, heart failure reduced ejection fraction with LVEF of 20-25%, persistent atrial fibrillation s/p several cardioversions on apixaban, peripheral arterial disease who presents with recurrent shortness of breath and chest tightness. Recently admitted to  on 01/25 for acute hypoxic respiratory failure in the setting of acute on chronic heart failure exacerbation likely due to recurrent atrial fibrillation for which he is now s/p JOHNY + successful cardioversion with IC on 01/26. He was diuresed with furosemide 40 IV BID during this brief hospital admission with appropriate response and was discharged the evening of 01/26 after improvement in his symptoms.      While in the ED: patient found to be hypoxic w SpO2 87% on RA upon arrival in the ED with improvement after 2L NC, otherwise HDS. EKG without ST elevation. BNP >962. Trop negative. CXR with cardiomegaly with increased pulmonary venous pressure likely pulmonary edema/CHF, grossly unchanged from prior.      Admitted to  for acute hypoxic respiratory failure in the setting of acute on chronic congestive heart failure secondary to volume overload.      Procedure(s) (LRB):  REPAIR, AORTIC VALVE (N/A)       Hospital Course:   Pt  readmitted for acute hypoxic respiratory failure due to acute on chronic heart failure.  Underwent johny/cardioversion on 1/26 with dramatic improvement in symptoms and resolution of hypoxia.  Patient was discharged home that evening.  However, dyspnea and hypoxia returned and patient was readmitted. Initiated heart failure pathway.  Initially treated with IV Lasix.  Now  euvolemic and transitioned to Lasix 40 mg p.o. daily.  TTE showed probable severe aortic stenosis and LVEF of 20-25%.  Discontinued midodrine.  Optimizing GDMT for treatment of heart failure reduced ejection fraction.  Started empagliflozin and Entresto.  Continues on home metoprolol succinate.  Given current blood pressure, it does not appear that he will tolerate any additional medications or dose titration.  Underwent dobutamine stress echo on 01/30 consistent with severe aortic stenosis. Following dobutamine infusion valve area found to be 0.5 cm2 with peak velocity of 4.1 m/sec and mean gradient of 42 mm of Hg.  Undergoing evaluation by Interventional Cardiology for balloon aortic valvuloplasty as bridge to TAVR or surgical AVR.  Patient would likely benefit from cardiac rehab.  Will need evaluation for possible AICD, maybe cardiac resynchronization therapy at some point in the next several months.  Patient will need to be discharged with supplemental O2.       Goals of Care Treatment Preferences:  Code Status: Full Code     BP (!) 117/58 (Patient Position: Lying)   Pulse 72   Temp 98 °F (36.7 °C) (Oral)   Resp 18   Ht 6' (1.829 m)   Wt 93.1 kg (205 lb 4 oz)   SpO2 96%   BMI 27.84 kg/m²       Physical Exam  Vitals and nursing note reviewed.   Constitutional:       General: He is not in acute distress.  HENT:      Head: Normocephalic and atraumatic.   Eyes:      General: No scleral icterus.     Pupils: Pupils are equal, round, and reactive to light.   Cardiovascular:      Rate and Rhythm: Normal rate.      Pulses: Normal pulses.      Heart sounds: Murmur (systolic) heard.   Pulmonary:      Effort: Pulmonary effort is normal. No respiratory distress.      Breath sounds: Rales present. No wheezing.   Abdominal:      General: Bowel sounds are normal. There is no distension.      Palpations: Abdomen is soft.      Tenderness: There is no abdominal tenderness. There is no guarding.   Musculoskeletal:       Cervical back: Neck supple.      Right lower leg: No edema.      Left lower leg: No edema.   Skin:     General: Skin is warm and dry.   Neurological:      General: No focal deficit present.      Mental Status: He is alert.     Consults:   Consults (From admission, onward)            Status Ordering Provider       Inpatient consult to Interventional Cardiology  Once        Provider:  (Not yet assigned)    Completed DEBORAH DICKINSON       Inpatient consult to Cardiology  Once        Provider:  (Not yet assigned)    Completed TULIO WALSH       Inpatient consult to Registered Dietitian/Nutritionist  Once        Provider:  (Not yet assigned)    Completed SILAS PENALOZA new Assessment & Plan notes have been filed under this hospital service since the last note was generated.  Service: Hospital Medicine            Final Active Diagnoses:     Diagnosis Date Noted POA    PRINCIPAL PROBLEM:  Acute on chronic heart failure with reduced ejection fraction (HFrEF) [I50.23] 01/27/2024 Yes    Iron deficiency anemia [D50.9] 02/01/2024 Yes    Acute hypoxic respiratory failure [J96.01] 01/27/2024 Yes    History of liver transplant [Z94.4] 01/27/2024 Not Applicable    Aortic stenosis [I35.0]   Yes    Persistent atrial fibrillation [I48.19] 05/14/2021 Yes       Chronic    Coronary artery disease involving native coronary artery of native heart without angina pectoris [I25.10] 01/25/2021 Yes       Chronic    Type 2 diabetes mellitus, with long-term current use of insulin [E11.9, Z79.4] 01/19/2021 Not Applicable       Chronic    Primary hypertension [I10] 11/23/2020 Yes       Chronic    Hyperlipidemia [E78.5] 11/23/2020 Yes       Problems Resolved During this Admission:         Discharged Condition: good     Disposition: Home or Self Care     Follow Up:    Follow-up Information         Shilo Carrasco MD Follow up in 2 week(s).    Specialties: Interventional Cardiology, Cardiology  Contact information:  [id number]  [facility] [address]  [phone]                    Thais Maxwell MD Follow up in 1 week(s).    Specialties: Transplant, Hepatology  Contact information:  [address]  [facility] [id number] [phone]                                   Patient Instructions:       OXYGEN FOR HOME USE      Order Specific Question Answer Comments   Liter Flow 2 0-5   Duration Continuous     Qualifying Test Performed at: Rest     Oxygen saturation: 86     Portable mode: continuous     Route nasal cannula     Device: home concentrator with portable tanks     Length of need (in months): 99 mos     Patient condition with qualifying saturation CHF     Height: 6' (1.829 m)     Weight: 93 kg (205 lb)     Alternative treatment measures have been tried or considered and deemed clinically ineffective. Yes             Ambulatory referral/consult to Cardiac Rehab    Standing Status: Future   Referral Priority: Routine Referral Type: Consultation    Referral Reason: Specialty Services Required    Requested Specialty: Cardiac Rehabilitation    Number of Visits Requested: 1            Ambulatory referral/consult to Interventional Cardiology    Standing Status: Future   Referral Priority: Routine Referral Type: Consultation    Referral Reason: Specialty Services Required    Requested Specialty: Interventional Cardiology    Number of Visits Requested: 1       Diet diabetic      Diet Cardiac      Notify your health care provider if you experience any of the following:  temperature >100.4      Notify your health care provider if you experience any of the following:  difficulty breathing or increased cough      Notify your health care provider if you experience any of the following:  persistent dizziness, light-headedness, or visual disturbances      Notify your health care provider if you experience any of the following:  increased confusion or weakness      Activity as tolerated         Significant Diagnostic Studies:  "N/A     Pending Diagnostic Studies:         None             Medications:  Reconciled Home Medications:       Medication List          START taking these medications       ENTRESTO 24-26 mg per tablet  Generic drug: sacubitriL-valsartan  Take 1 tablet by mouth 2 (two) times daily.      FeroSuL 325 mg (65 mg iron) Tab tablet  Generic drug: ferrous sulfate  Take 1 tablet (325 mg total) by mouth once daily.      furosemide 40 MG tablet  Commonly known as: LASIX  Take 1 tablet (40 mg total) by mouth once daily.      JARDIANCE 10 mg tablet  Generic drug: empagliflozin  Take 1 tablet (10 mg total) by mouth once daily.                CHANGE how you take these medications       LANTUS SOLOSTAR U-100 INSULIN glargine 100 units/mL SubQ pen  Generic drug: insulin  INJECT 22 UNITS INTO THE SKIN EVERY EVENING. ADJUST DOSE UNTIL AM GLUCOSE GOAL . MAX DOSE 30 UNITS/DY  What changed: See the new instructions.                CONTINUE taking these medications       atorvastatin 80 MG tablet  Commonly known as: LIPITOR  Take 1 tablet (80 mg total) by mouth once daily.      BD ULTRA-FINE GÉNESIS PEN NEEDLE 32 gauge x 5/32" Ndle  Generic drug: pen needle, diabetic  Use to inject insulin 4 times daily      blood sugar diagnostic Strp  To check BG 2 times daily, to use with insurance preferred meter (patient has a TrueMetrix self-monitoring glucose meter)      blood-glucose meter kit  To check BG 2 times daily, to use with insurance preferred meter      buPROPion 150 MG TB24 tablet  Commonly known as: WELLBUTRIN XL  Take 1 tablet (150 mg total) by mouth every morning.      clopidogreL 75 mg tablet  Commonly known as: PLAVIX  Take 1 tablet (75 mg total) by mouth once daily.      ELIQUIS 5 mg Tab  Generic drug: apixaban  Take 1 tablet (5 mg total) by mouth 2 (two) times daily.      fish oil-omega-3 fatty acids 300-1,000 mg capsule  Take 1 capsule by mouth once daily.      HumaLOG KwikPen Insulin 100 unit/mL pen  Generic drug: insulin " "lispro  Inject 6 Units into the skin 3 (three) times daily with meals. Plus sliding scale, MDD: 48 units      lancets Misc  To check BG 2 times daily, to use with insurance preferred meter      metoprolol succinate 25 MG 24 hr tablet  Commonly known as: TOPROL-XL  Take 1 tablet (25 mg total) by mouth once daily.      multivitamin capsule  Take 1 capsule by mouth once daily.      pantoprazole 40 MG tablet  Commonly known as: PROTONIX  Take 1 tablet (40 mg total) by mouth once daily.      QUEtiapine 100 MG Tab  Commonly known as: SEROQUEL  Take 1 tablet (100 mg total) by mouth every evening.      tacrolimus 0.5 MG Cap  Commonly known as: PROGRAF  Take 1 capsule (0.5 mg total) by mouth every 12 (twelve) hours.                STOP taking these medications       midodrine 10 MG tablet  Commonly known as: PROAMATINE                   Indwelling Lines/Drains at time of discharge:   Lines/Drains/Airways         None                         Time spent on the discharge of patient: 35 minutes           Elizabet Trejo DO  Department of Hospital Medicine  [facility] - Cardiology Stepdown      Cosigned by: Ashu Pascal MD at 2/2/2024  9:04 PM   Electronically signed by Elizabet Trejo DO at 2/2/2024  7:21 PM  Electronically signed by Ashu Pascal MD at 2/2/2024  9:04 PM"    Patient queried and denies any further complaints      Patient Active Problem List   Diagnosis    Hyperlipidemia    Primary hypertension    Skin cancer    Kidney stone    Diabetic neuropathy    PAD (peripheral artery disease)    Abdominal pain    Type 2 diabetes mellitus, with long-term current use of insulin    Coronary artery disease involving native coronary artery of native heart without angina pectoris    Persistent atrial fibrillation    History of cardioversion    Current use of anticoagulant therapy    S/P liver transplant    At risk for opportunistic infections    Prophylactic immunotherapy    Anemia of chronic disease    Long-term " use of immunosuppressant medication    Weakness    Type 2 diabetes mellitus with hyperglycemia    Anemia due to unknown mechanism    Fatigue    Other ascites    Cholangiocarcinoma    Aortic stenosis    Unstable angina    Presence of drug-eluting stent in left circumflex coronary artery    Orthostatic dizziness    Nephrolithiasis    Acute hypoxic respiratory failure    Acute on chronic heart failure with reduced ejection fraction (HFrEF)    History of liver transplant    Iron deficiency anemia       SURGICAL AND MEDICAL HISTORY: updated and reviewed.  Past Surgical History:   Procedure Laterality Date    ANGIOGRAM, CORONARY, WITH LEFT HEART CATHETERIZATION N/A 12/26/2023    Procedure: Angiogram, Coronary, with Left Heart Cath;  Surgeon: Carlos King MD;  Location: [facility] CATH LAB;  Service: Cardiology;  Laterality: N/A;    AORTIC VALVULOPLASTY N/A 2/1/2024    Procedure: REPAIR, AORTIC VALVE;  Surgeon: Shilo Carrasco MD;  Location: [facility] CATH LAB;  Service: Cardiology;  Laterality: N/A;    COLONOSCOPY  10/2020    ECHOCARDIOGRAM,TRANSESOPHAGEAL N/A 1/26/2024    Procedure: Transesophageal echo (ASHISH) intra-procedure log documentation;  Surgeon: Nick Mathur III, MD;  Location: [facility] EP LAB;  Service: Cardiology;  Laterality: N/A;    ESOPHAGEAL MANOMETRY WITH MEASUREMENT OF IMPEDANCE N/A 7/17/2023    Procedure: MANOMETRY, ESOPHAGUS, WITH IMPEDANCE MEASUREMENT;  Surgeon: Klarissa Zamora MD;  Location: [facility] ENDO (4TH FLR);  Service: Gastroenterology;  Laterality: N/A;  pt diabetic  liver txp  prep insructions sent to pt via email and portal -Jm    ESOPHAGOGASTRODUODENOSCOPY  10/2020    ESOPHAGOGASTRODUODENOSCOPY N/A 7/1/2021    Procedure: EGD (ESOPHAGOGASTRODUODENOSCOPY);  Surgeon: Jovan David MD;  Location: [facility] ENDO (4TH FLR);  Service: Endoscopy;  Laterality: N/A;  HCC. Listed for liver transplant. EGD for variceal surveillance.  cardiac clearance and blood thinenr approval received, see telephone  encounter 6/23/21-BB  fully vaccinated-BB  labs same day of procedure-BB    EXCISION OF HYDROCELE Right     hemorroid surgery      hemorroidectomy      KIDNEY STONE SURGERY      LAPAROSCOPIC APPENDECTOMY N/A 6/2/2023    Procedure: APPENDECTOMY, LAPAROSCOPIC;  Surgeon: Shan Ross MD;  [facility];  Service: General;  Laterality: N/A;    LEFT HEART CATHETERIZATION N/A 1/27/2021    Procedure: Left heart cath;  Surgeon: Kris Shelton MD;  Location: [facility] CATH LAB;  Service: Cardiology;  Laterality: N/A;    liver mass removal      LIVER TRANSPLANT N/A 1/6/2022    Procedure: TRANSPLANT, LIVER;  Surgeon: Lizet Garcia MD;  [facility];  Service: Transplant;  Laterality: N/A;    RIGHT HEART CATHETERIZATION Right 5/7/2021    Procedure: INSERTION, CATHETER, RIGHT HEART;  Surgeon: Jaden Schuster MD;  Location: [facility] CATH LAB;  Service: Cardiology;  Laterality: Right;    STENT, DRUG ELUTING, SINGLE VESSEL, CORONARY  12/26/2023    Procedure: Stent, Drug Eluting, Single Vessel, Coronary;  Surgeon: Carlos King MD;  Location: [facility] CATH LAB;  Service: Cardiology;;    TREATMENT OF CARDIAC ARRHYTHMIA N/A 5/19/2021    Procedure: CARDIOVERSION;  Surgeon: Didier Tilley MD;  Location: [facility] EP LAB;  Service: Cardiology;  Laterality: N/A;  a fib, dccv/johny, mac, dm. sscu    TREATMENT OF CARDIAC ARRHYTHMIA N/A 5/31/2021    Procedure: CARDIOVERSION;  Surgeon: Daniel Arambula MD;  Location: [facility] EP LAB;  Service: Cardiology;  Laterality: N/A;  afib, DCCV, anest., DM, 3 prep    TREATMENT OF CARDIAC ARRHYTHMIA N/A 7/7/2021    Procedure: Cardioversion or Defibrillation;  Surgeon: Didier Tilley MD;  Location: [facility] EP LAB;  Service: Cardiology;  Laterality: N/A;  AF, JOHNY (cancel if complaint), DCCV, MAC, DM, 3 Prep    TREATMENT OF CARDIAC ARRHYTHMIA N/A 7/27/2021    Procedure: Cardioversion or Defibrillation;  Surgeon: Didier Tilley MD;  Location: [facility] EP LAB;  Service: Cardiology;   Laterality: N/A;  AF, ASHISH (cx if complaint), DCCV, MAC, DM, 3 Prep    TREATMENT OF CARDIAC ARRHYTHMIA N/A 1/26/2024    Procedure: Cardioversion or Defibrillation;  Surgeon: Koko Knight MD;  Location: [facility] EP LAB;  Service: Cardiology;  Laterality: N/A;  AF, ASHISH, DCCV, MAC, DM, 3 Prep *ADENOSINE TEST* *LIVER TRANSPLANT PATIENT*     ALLERGIES updated and reviewed.  Review of patient's allergies indicates:   Allergen Reactions    Bee pollens Swelling     BEE STINGS swells body       CURRENT OUTPATIENT MEDICATIONS updated and reviewed    Current Outpatient Medications:     apixaban (ELIQUIS) 5 mg Tab, Take 1 tablet (5 mg total) by mouth 2 (two) times daily., Disp: 60 tablet, Rfl: 3    atorvastatin (LIPITOR) 80 MG tablet, Take 1 tablet (80 mg total) by mouth once daily., Disp: 30 tablet, Rfl: 11    blood sugar diagnostic Strp, To check BG 2 times daily, to use with insurance preferred meter (patient has a TrueMet[employer] self-monitoring glucose meter), Disp: 100 strip, Rfl: 11    blood-glucose meter kit, To check BG 2 times daily, to use with insurance preferred meter, Disp: 1 each, Rfl: 0    buPROPion (WELLBUTRIN XL) 150 MG TB24 tablet, Take 1 tablet (150 mg total) by mouth every morning., Disp: 30 tablet, Rfl: 0    clopidogreL (PLAVIX) 75 mg tablet, Take 1 tablet (75 mg total) by mouth once daily., Disp: 30 tablet, Rfl: 11    empagliflozin (JARDIANCE) 10 mg tablet, Take 1 tablet (10 mg total) by mouth once daily., Disp: 30 tablet, Rfl: 2    ferrous sulfate (IRON) 325 mg (65 mg iron) Tab tablet, Take 1 tablet (325 mg total) by mouth once daily., Disp: 30 tablet, Rfl: 2    furosemide (LASIX) 40 MG tablet, Take 0.5 tablets (20 mg total) by mouth once daily. Okay to take lasix 40mg daily as needed for worsening shortness of breath, swelling, or 3lb weight gain, Disp: 30 tablet, Rfl: 6    insulin (LANTUS SOLOSTAR U-100 INSULIN) glargine 100 units/mL SubQ pen, INJECT 22 UNITS INTO THE SKIN EVERY EVENING. ADJUST DOSE UNTIL AM  "GLUCOSE GOAL . MAX DOSE 30 UNITS/DY (Patient taking differently: Last documented fill 11/15/23 - consider refill), Disp: 15 mL, Rfl: 11    insulin lispro (HUMALOG KWIKPEN INSULIN) 100 unit/mL pen, Inject 6 Units into the skin 3 (three) times daily with meals. Plus sliding scale, MDD: 48 units (Patient taking differently: Inject 6 Units into the skin 3 (three) times daily with meals. Plus sliding scale, MDD: 48 units  Last documented fill 9/20/23 - consider refill), Disp: 15 mL, Rfl: 11    lancets Misc, To check BG 2 times daily, to use with insurance preferred meter, Disp: 100 each, Rfl: 11    metoprolol succinate (TOPROL-XL) 25 MG 24 hr tablet, Take 1 tablet (25 mg total) by mouth once daily., Disp: 30 tablet, Rfl: 11    multivitamin capsule, Take 1 capsule by mouth once daily., Disp: , Rfl:     omega-3 fatty acids/fish oil (FISH OIL-OMEGA-3 FATTY ACIDS) 300-1,000 mg capsule, Take 1 capsule by mouth once daily. , Disp: , Rfl:     pantoprazole (PROTONIX) 40 MG tablet, Take 1 tablet (40 mg total) by mouth once daily., Disp: 30 tablet, Rfl: 5    pen needle, diabetic (BD ULTRA-FINE GÉNESIS PEN NEEDLE) 32 gauge x 5/32" Ndle, Use to inject insulin 4 times daily, Disp: 200 each, Rfl: 11    QUEtiapine (SEROQUEL) 100 MG Tab, Take 1 tablet (100 mg total) by mouth every evening., Disp: 30 tablet, Rfl: 2    sacubitriL-valsartan (ENTRESTO) 24-26 mg per tablet, Take 1 tablet by mouth 2 (two) times daily., Disp: 60 tablet, Rfl: 2    tacrolimus (PROGRAF) 0.5 MG Cap, Take 1 capsule (0.5 mg total) by mouth every 12 (twelve) hours., Disp: 60 capsule, Rfl: 11  No current facility-administered medications for this visit.    Facility-Administered Medications Ordered in Other Visits:     sodium chloride 0.9% bolus 1,000 mL, 1,000 mL, Intravenous, Once, Solange Bill, NP    Review of Systems   Constitutional:  Negative for activity change, appetite change, chills, diaphoresis, fatigue, fever and unexpected weight change.   HENT:  " Negative for congestion, ear discharge, ear pain, facial swelling, hearing loss, nosebleeds, postnasal drip, rhinorrhea, sinus pressure, sneezing, sore throat, tinnitus, trouble swallowing and voice change.    Eyes:  Negative for photophobia, pain, discharge, redness, itching and visual disturbance.   Respiratory:  Negative for cough, chest tightness, shortness of breath and wheezing.    Cardiovascular:  Negative for chest pain, palpitations and leg swelling.   Gastrointestinal:  Negative for abdominal distention, abdominal pain, anal bleeding, blood in stool, constipation, diarrhea, nausea, rectal pain and vomiting.   Endocrine: Negative for cold intolerance, heat intolerance, polydipsia, polyphagia and polyuria.   Genitourinary:  Negative for difficulty urinating, dysuria and flank pain.   Musculoskeletal:  Negative for arthralgias, back pain, joint swelling, myalgias and neck pain.   Skin:  Negative for rash.   Neurological:  Negative for dizziness, tremors, seizures, syncope, speech difficulty, weakness, light-headedness, numbness and headaches.   Psychiatric/Behavioral:  Negative for behavioral problems, confusion, decreased concentration, dysphoric mood, sleep disturbance and suicidal ideas. The patient is not nervous/anxious and is not hyperactive.        BP (!) 110/55   Pulse (!) 52   Temp 97.7 °F (36.5 °C)   Ht 6' (1.829 m)   Wt 97 kg (213 lb 12.8 oz)   SpO2 95%   BMI 29.00 kg/m²   Physical Exam  Vitals and nursing note reviewed.   Constitutional:       General: He is not in acute distress.     Appearance: Normal appearance. He is well-developed. He is not ill-appearing or toxic-appearing.   HENT:      Head: Normocephalic and atraumatic.      Right Ear: Tympanic membrane, ear canal and external ear normal.      Left Ear: Tympanic membrane, ear canal and external ear normal.      Nose: Nose normal.      Mouth/Throat:      Lips: Pink.      Mouth: Mucous membranes are moist.      Pharynx: No  oropharyngeal exudate or posterior oropharyngeal erythema.   Eyes:      General: No scleral icterus.        Right eye: No discharge.         Left eye: No discharge.      Extraocular Movements: Extraocular movements intact.      Conjunctiva/sclera: Conjunctivae normal.   Cardiovascular:      Rate and Rhythm: Normal rate and regular rhythm.      Pulses: Normal pulses.      Heart sounds: Normal heart sounds. No murmur heard.  Pulmonary:      Effort: Pulmonary effort is normal. No respiratory distress.      Breath sounds: Normal breath sounds. No wheezing or rales.   Abdominal:      General: Bowel sounds are normal. There is no distension.      Palpations: Abdomen is soft. There is no mass.      Tenderness: There is no abdominal tenderness. There is no right CVA tenderness, left CVA tenderness, guarding or rebound.      Hernia: No hernia is present.   Musculoskeletal:      Cervical back: Normal range of motion and neck supple. No rigidity or tenderness.   Lymphadenopathy:      Cervical: No cervical adenopathy.   Skin:     General: Skin is warm and dry.   Neurological:      General: No focal deficit present.      Mental Status: He is alert. Mental status is at baseline.   Psychiatric:         Mood and Affect: Mood normal.         Behavior: Behavior normal. Behavior is cooperative.         ASSESSMENT/PLAN    1. Hospital discharge follow-up    2. S/P liver transplant    3. Type 2 diabetes mellitus with hyperglycemia, with long-term current use of insulin  -     insulin (LANTUS SOLOSTAR U-100 INSULIN) glargine 100 units/mL SubQ pen; Inject 22 Units into the skin every evening.  Dispense: 15 mL; Refill: 11  -     insulin lispro (HUMALOG KWIKPEN INSULIN) 100 unit/mL pen; Inject 6 Units into the skin 3 (three) times daily with meals. Plus sliding scale, MDD: 48 units  Dispense: 15 mL; Refill: 5    4. Portal hypertension  -     pantoprazole (PROTONIX) 40 MG tablet; Take 1 tablet (40 mg total) by mouth once daily.  Dispense: 90  tablet; Refill: 1    5. Other cirrhosis of liver  -     pantoprazole (PROTONIX) 40 MG tablet; Take 1 tablet (40 mg total) by mouth once daily.  Dispense: 90 tablet; Refill: 1    6. Awaiting liver transplant  -     pantoprazole (PROTONIX) 40 MG tablet; Take 1 tablet (40 mg total) by mouth once daily.  Dispense: 90 tablet; Refill: 1    7. Depression, unspecified depression type  -     buPROPion (WELLBUTRIN XL) 150 MG TB24 tablet; Take 1 tablet (150 mg total) by mouth every morning.  Dispense: 90 tablet; Refill: 1  -     QUEtiapine (SEROQUEL) 100 MG Tab; Take 1 tablet (100 mg total) by mouth every evening.  Dispense: 90 tablet; Refill: 1    8. Persistent atrial fibrillation  -     metoprolol succinate (TOPROL-XL) 25 MG 24 hr tablet; Take 1 tablet (25 mg total) by mouth once daily.  Dispense: 90 tablet; Refill: 1    9. Immunodeficiency due to drugs    10. Heart failure with reduced ejection fraction            Most recent some lab results reviewed with patient.  Any new prescription medications gone over in detail including reason for taking the medication, most common possible side effects and possible costs, etcetera.    Chronic conditions updated. Other than changes or additions as above, cont current medications and maintain follow-up with specialists if indicated.     Kole Sharpe MD  A dictation device was used to produce this document. Use of such devices sometimes results in grammatical errors or replacement of words that sound similarly.      Est4  Time spent in the evaluation and management of this patient exceeded 30min and greater than 50% of this time was in face-to-face time with the patient on day of the clinic visit.   This includes face-to-face time and non face-to-face time and includes the following:  --preparing to see the patient (eg, obtaining and/or reviewing old records such as, when applicable, primary care notes, specialist notes, hospital notes, review of laboratory tests, and/or  radiographic and/or cardiology or other studies)  --performing a medically appropriate review of systems and examination and/or evaluation  --reviewing and independently interpreting results (not separately reported; eg, lab results) and communicating results to the patient and/or family/caregiver  --placing orders and/or reviewing other physician's orders which can both include medications, laboratory studies, radiographic studies, procedures, referrals etcetera and   --counseling and educating the patient and/or family member/caregiver regarding the treatment plan  --documentation of the visit in the electronic health record  --communicating with other health care providers regarding referrals, studies, follow-up, etc

## 2024-02-15 PROBLEM — Z79.899 IMMUNODEFICIENCY DUE TO DRUGS: Status: ACTIVE | Noted: 2024-02-15

## 2024-02-15 PROBLEM — D84.821 IMMUNODEFICIENCY DUE TO DRUGS: Status: ACTIVE | Noted: 2024-02-15

## 2024-02-16 ENCOUNTER — NUTRITION (OUTPATIENT)
Dept: TRANSPLANT | Facility: CLINIC | Age: 69
End: 2024-02-16
Payer: MEDICARE

## 2024-02-16 ENCOUNTER — LAB VISIT (OUTPATIENT)
Dept: LAB | Facility: HOSPITAL | Age: 69
End: 2024-02-16
Payer: MEDICARE

## 2024-02-16 ENCOUNTER — TELEPHONE (OUTPATIENT)
Dept: CARDIAC REHAB | Facility: CLINIC | Age: 69
End: 2024-02-16
Payer: MEDICARE

## 2024-02-16 ENCOUNTER — OFFICE VISIT (OUTPATIENT)
Dept: CARDIOLOGY | Facility: CLINIC | Age: 69
End: 2024-02-16
Payer: MEDICARE

## 2024-02-16 VITALS
HEART RATE: 54 BPM | OXYGEN SATURATION: 94 % | HEIGHT: 72 IN | WEIGHT: 215.19 LBS | DIASTOLIC BLOOD PRESSURE: 52 MMHG | SYSTOLIC BLOOD PRESSURE: 100 MMHG | BODY MASS INDEX: 29.15 KG/M2

## 2024-02-16 DIAGNOSIS — E78.5 HYPERLIPIDEMIA, UNSPECIFIED HYPERLIPIDEMIA TYPE: ICD-10-CM

## 2024-02-16 DIAGNOSIS — I25.10 CORONARY ARTERY DISEASE INVOLVING NATIVE CORONARY ARTERY OF NATIVE HEART WITHOUT ANGINA PECTORIS: ICD-10-CM

## 2024-02-16 DIAGNOSIS — E11.21 TYPE 2 DIABETES MELLITUS WITH DIABETIC NEPHROPATHY, WITH LONG-TERM CURRENT USE OF INSULIN: ICD-10-CM

## 2024-02-16 DIAGNOSIS — I50.20 HFREF (HEART FAILURE WITH REDUCED EJECTION FRACTION): Primary | ICD-10-CM

## 2024-02-16 DIAGNOSIS — I48.19 PERSISTENT ATRIAL FIBRILLATION: ICD-10-CM

## 2024-02-16 DIAGNOSIS — I10 PRIMARY HYPERTENSION: ICD-10-CM

## 2024-02-16 DIAGNOSIS — Z79.01 CURRENT USE OF ANTICOAGULANT THERAPY: ICD-10-CM

## 2024-02-16 DIAGNOSIS — I35.0 AORTIC VALVE STENOSIS, ETIOLOGY OF CARDIAC VALVE DISEASE UNSPECIFIED: ICD-10-CM

## 2024-02-16 DIAGNOSIS — I50.23 ACUTE ON CHRONIC HFREF (HEART FAILURE WITH REDUCED EJECTION FRACTION): Primary | ICD-10-CM

## 2024-02-16 DIAGNOSIS — I38 VALVULAR HEART DISEASE: ICD-10-CM

## 2024-02-16 DIAGNOSIS — Z79.4 TYPE 2 DIABETES MELLITUS WITH DIABETIC NEPHROPATHY, WITH LONG-TERM CURRENT USE OF INSULIN: ICD-10-CM

## 2024-02-16 DIAGNOSIS — R06.02 SOB (SHORTNESS OF BREATH): ICD-10-CM

## 2024-02-16 LAB
ALBUMIN SERPL BCP-MCNC: 3.4 G/DL (ref 3.5–5.2)
ALP SERPL-CCNC: 121 U/L (ref 55–135)
ALT SERPL W/O P-5'-P-CCNC: 27 U/L (ref 10–44)
ANION GAP SERPL CALC-SCNC: 9 MMOL/L (ref 8–16)
AST SERPL-CCNC: 28 U/L (ref 10–40)
BILIRUB SERPL-MCNC: 0.4 MG/DL (ref 0.1–1)
BNP SERPL-MCNC: 167 PG/ML (ref 0–99)
BUN SERPL-MCNC: 22 MG/DL (ref 8–23)
CALCIUM SERPL-MCNC: 9.2 MG/DL (ref 8.7–10.5)
CHLORIDE SERPL-SCNC: 105 MMOL/L (ref 95–110)
CO2 SERPL-SCNC: 26 MMOL/L (ref 23–29)
CREAT SERPL-MCNC: 1.4 MG/DL (ref 0.5–1.4)
EST. GFR  (NO RACE VARIABLE): 54.1 ML/MIN/1.73 M^2
GLUCOSE SERPL-MCNC: 205 MG/DL (ref 70–110)
MAGNESIUM SERPL-MCNC: 2.4 MG/DL (ref 1.6–2.6)
PHOSPHATE SERPL-MCNC: 3.8 MG/DL (ref 2.7–4.5)
POTASSIUM SERPL-SCNC: 3.8 MMOL/L (ref 3.5–5.1)
PROT SERPL-MCNC: 6.9 G/DL (ref 6–8.4)
SODIUM SERPL-SCNC: 140 MMOL/L (ref 136–145)

## 2024-02-16 PROCEDURE — 99999 PR PBB SHADOW E&M-EST. PATIENT-LVL V: CPT | Mod: PBBFAC,,,

## 2024-02-16 PROCEDURE — 84100 ASSAY OF PHOSPHORUS: CPT

## 2024-02-16 PROCEDURE — 80053 COMPREHEN METABOLIC PANEL: CPT

## 2024-02-16 PROCEDURE — 36415 COLL VENOUS BLD VENIPUNCTURE: CPT

## 2024-02-16 PROCEDURE — 97802 MEDICAL NUTRITION INDIV IN: CPT | Mod: S$GLB,,,

## 2024-02-16 PROCEDURE — 99214 OFFICE O/P EST MOD 30 MIN: CPT | Mod: S$GLB,,,

## 2024-02-16 PROCEDURE — 83880 ASSAY OF NATRIURETIC PEPTIDE: CPT

## 2024-02-16 PROCEDURE — 83735 ASSAY OF MAGNESIUM: CPT

## 2024-02-16 RX ORDER — FUROSEMIDE 40 MG/1
40 TABLET ORAL DAILY
Qty: 45 TABLET | Refills: 6
Start: 2024-02-16 | End: 2024-02-28

## 2024-02-16 NOTE — PATIENT INSTRUCTIONS
Resume lasix 40mg once daily. Will call Monday morning to check weight trend and give further lasix recommendations at that time.    Keep track of weights over the weekend.

## 2024-02-16 NOTE — PROGRESS NOTES
TRANSPLANT NUTRITIONAL ASSESSMENT    Referring Provider: Vivian Lam MD      Reason for Visit: CHF diet education     Age: 68 y.o.  Sex: male    Patient Active Problem List   Diagnosis    Hyperlipidemia    Primary hypertension    Skin cancer    Kidney stone    Diabetic neuropathy    PAD (peripheral artery disease)    Abdominal pain    Type 2 diabetes mellitus, with long-term current use of insulin    Coronary artery disease involving native coronary artery of native heart without angina pectoris    Persistent atrial fibrillation    History of cardioversion    Current use of anticoagulant therapy    S/P liver transplant    At risk for opportunistic infections    Prophylactic immunotherapy    Anemia of chronic disease    Long-term use of immunosuppressant medication    Weakness    Type 2 diabetes mellitus with hyperglycemia    Anemia due to unknown mechanism    Fatigue    Other ascites    Cholangiocarcinoma    Aortic stenosis    Unstable angina    Presence of drug-eluting stent in left circumflex coronary artery    Orthostatic dizziness    Nephrolithiasis    Acute hypoxic respiratory failure    Acute on chronic heart failure with reduced ejection fraction (HFrEF)    History of liver transplant    Iron deficiency anemia    Immunodeficiency due to drugs     Past Medical History:   Diagnosis Date    Acute appendicitis 06/02/2023    Aortic stenosis     Atrial fibrillation     johny/cardioversion on 1/26/24    Calculus of ureter 12/22/2023    CHF (congestive heart failure)     Cholangiocarcinoma 03/16/2022    s/p liver transplant    Cirrhosis 11/23/2020    s/p transplant 2022 for CASTILLO cirrhosis and cholangiocarcinoma    Diabetes mellitus, type 2     c/b Diabetic neuropathy    HTN (hypertension) 11/23/2020    Hyperlipidemia 11/23/2020    Kidney stones 11/23/2020    S/p lithotripsy 2020    PAD (peripheral artery disease)     Skin cancer 11/23/2020     Lab Results   Component Value Date     (H) 02/16/2024    K 3.8  02/16/2024    PHOS 3.8 02/16/2024    MG 2.4 02/16/2024    CHOL 130 09/19/2022    HDL 36 (L) 09/19/2022    TRIG 144 09/19/2022    ALBUMIN 3.4 (L) 02/16/2024    AMMONIA 35 06/08/2021    HGBA1C 7.5 (H) 01/26/2024    CALCIUM 9.2 02/16/2024     Other Pertinent Labs: GFR: 54.1 (L)    Current Outpatient Medications   Medication Sig    apixaban (ELIQUIS) 5 mg Tab Take 1 tablet (5 mg total) by mouth 2 (two) times daily.    atorvastatin (LIPITOR) 80 MG tablet Take 1 tablet (80 mg total) by mouth once daily.    blood sugar diagnostic Strp To check BG 2 times daily, to use with insurance preferred meter (patient has a [employer] self-monitoring glucose meter)    blood-glucose meter kit To check BG 2 times daily, to use with insurance preferred meter    buPROPion (WELLBUTRIN XL) 150 MG TB24 tablet Take 1 tablet (150 mg total) by mouth every morning.    clopidogreL (PLAVIX) 75 mg tablet Take 1 tablet (75 mg total) by mouth once daily.    empagliflozin (JARDIANCE) 10 mg tablet Take 1 tablet (10 mg total) by mouth once daily.    ferrous sulfate (IRON) 325 mg (65 mg iron) Tab tablet Take 1 tablet (325 mg total) by mouth once daily.    furosemide (LASIX) 40 MG tablet Take 1 tablet (40 mg total) by mouth once daily. Okay to take additional lasix 40mg in the afternoon as needed for worsening shortness of breath, swelling, or 3lb weight gain    insulin (LANTUS SOLOSTAR U-100 INSULIN) glargine 100 units/mL SubQ pen Inject 22 Units into the skin every evening.    insulin lispro (HUMALOG KWIKPEN INSULIN) 100 unit/mL pen Inject 6 Units into the skin 3 (three) times daily with meals. Plus sliding scale, MDD: 48 units    lancets Misc To check BG 2 times daily, to use with insurance preferred meter    metoprolol succinate (TOPROL-XL) 25 MG 24 hr tablet Take 1 tablet (25 mg total) by mouth once daily.    multivitamin capsule Take 1 capsule by mouth once daily.    omega-3 fatty acids/fish oil (FISH OIL-OMEGA-3 FATTY ACIDS) 300-1,000 mg capsule  "Take 1 capsule by mouth once daily.     pantoprazole (PROTONIX) 40 MG tablet Take 1 tablet (40 mg total) by mouth once daily.    pen needle, diabetic (BD ULTRA-FINE GÉNESIS PEN NEEDLE) 32 gauge x 5/32" Ndle Use to inject insulin 4 times daily    QUEtiapine (SEROQUEL) 100 MG Tab Take 1 tablet (100 mg total) by mouth every evening.    sacubitriL-valsartan (ENTRESTO) 24-26 mg per tablet Take 1 tablet by mouth 2 (two) times daily.    tacrolimus (PROGRAF) 0.5 MG Cap Take 1 capsule (0.5 mg total) by mouth every 12 (twelve) hours.     No current facility-administered medications for this visit.     Allergies: Bee pollens    Ht Readings from Last 1 Encounters:   02/16/24 6' (1.829 m)     Wt Readings from Last 1 Encounters:   02/16/24 97.6 kg (215 lb 2.7 oz)      BMI: 29.18 kg/m²     Usual Weight: 92 kg (87 kg last time in clinic)  Weight Change/Time: pt up 20 pounds in one week   Current Diet: GASTON, not following fluid res   Appetite/Current Intake: good   Exercise/Physical Activity: sedentary   Nutritional/Herbal Supplements: none   Potential Food/Medication Interactions: Entresto: avoid high K+ foods, Insulin: eat with medication, Jardiance: avoid green tea , Lipitor: avoid fatty foods and alcohol, Eliquis: avoid vit E supplementation   Chewing/Swallowing Problems: none   Symptoms: none  Assessment of Lab Values: DM, CHF, LIVER txp  Support System: family     Estimated Kcal Need: 2329 kcals (25 kcal/kg UBW)  Estimated Protein Need: 74.4 g (0.8 g/kg UBW)    Nutritional History: 2 meals per day and snacks. Meal 1 is [employer] double cheeseburger and sm. fries. Dinner is frozen chinese dumplings (with dipping sauce) on white rice. Pt snacks on pretzels. Pt drinks on average 90oz of fluid     Nutritional Diagnoses  Problem: excessive mineral intake: sodium  Etiology: cardiac dysfunction   Symptoms: nutr hist     Educational Need? yes  Barriers: none identified  Discussed with: patient  Interventions: Patient taught nutrition " information regarding CHF diet education   .   Pt was educated on how to read nutrition labels to understand food has natural sodium present, serving sizes, and reduced Na does not always mean the product does not have low Na . Recc'd of 600mg Na per meal. Pt recc drink more than 1000ml of fluid but no more no more than 2000ml per day. pt given contact info if questions or needs arise.     Goals/Recommendations: diet adherence and limit fluid intake  Actions Taken: instruct/provide written information  Strategies Used: problem solving, goal setting, motivational interviewing  Patient and/or family comprehend instructions: yes , adherence expected  Outcome: Verbalizes understanding  Monitoring: Contact information provided, will f/u in clinic and communicate with the care team as needed.     Counseling Time: 15 minutes

## 2024-02-19 ENCOUNTER — TELEPHONE (OUTPATIENT)
Dept: CARDIOLOGY | Facility: CLINIC | Age: 69
End: 2024-02-19
Payer: MEDICARE

## 2024-02-19 NOTE — TELEPHONE ENCOUNTER
TCC attempted to contact pt to assess his weight, and fluid sxs after PRN Laasix was Rx'ed over the weekend. RN left message for pt to contact clinic.

## 2024-02-20 ENCOUNTER — TELEPHONE (OUTPATIENT)
Dept: CARDIOLOGY | Facility: CLINIC | Age: 69
End: 2024-02-20
Payer: MEDICARE

## 2024-02-20 NOTE — TELEPHONE ENCOUNTER
"[facility] weekly phone follow up / triage call completed.     T[facility] RN Navigator spoke with pt, Mr. Burnham    Current Patient reported weight: 209 lbs   Patient Goal Weight: (not performing daily weights)  Recent Patient reported blood pressure and heart rate:  BP: 120s/ 80s    Pt reports the following:  []  Shortness of Breath with Activity  []  Shortness of Breath at rest   []  Fatigue  []  Edema   [] Chest pain or tightness  [] Weight Increase since discharge  [x] None of the above    Pt reports using "Daily weight and symptom tracker".    Pt reports being in the GREEN Zone. If in yellow/red, reminded that they should be calling [facility] today or now.     Medications:   Medication compliance reviewed with pt.  Pt reports having medication list available and has all medications at home for use per list.   Pt resumed 40 mg Lasix over the weekend, reports decrease in weight and sxs. Pa-C notified of medication results.    @1604: PA-C recommends pt return to Lasix 20 mg once daily after 6 lbs weight loss.    @1618: Pt notified of above plan of care updates.   Education:   Confirmed pt still has "[facility] Home Care Guide"  .     Reminded of key points as listed below.     Recommend 2 -3 gram sodium restriction and 1500 cc-2000 cc fluid restriction.  Encourage physical activity with graded exercise program.  Requested patient to weigh themselves daily, and to notify us if their weight increases by more than 3 lbs in 1 day or 5 lbs in 3 days.   Reminded to use "Daily weight and symptom tracker".  Even if pt does not have a scale, to use symptom tracker.       Watch for these Signs and Symptoms: If any of these occur, contact [facility] immediately:   Increase in shortness of breath with movement   Increase in swelling in your legs and ankles   Weight gain of more than 3 pounds in a day or 5 pounds in 3 days.   Difficulty breathing when you are lying down   " Worsening fatigue or tiredness   Stomach bloating, a full feeling or a loss of appetite   Increased coughing--especially when you are lying down      Pt was able to verbalize back to RN in their own words correct diet/fluid restrictions, necessity for exercise, warning signs and symptoms, when and how to contact their [facility] team.      Pt reminded of upcoming appointment.  PT reports they will attend. Pt appt scheduled for February 28, 2024 at 1 PM.      Pt reminded of how and when to contact [facility]:  [phone] (Mon-Fri, 8a-5p) & for urgent issues on the weekend to page the Heart Transplant MD on call.  Pt also encouraged utilize [facility] messaging as well.      Pt  verbalized understanding and in agreement of plan.       Will follow up with pt at next clinic visit and RN navigator available for pt questions, issues or concerns.

## 2024-02-22 ENCOUNTER — PATIENT MESSAGE (OUTPATIENT)
Dept: TRANSPLANT | Facility: CLINIC | Age: 69
End: 2024-02-22
Payer: MEDICARE

## 2024-02-23 ENCOUNTER — TELEPHONE (OUTPATIENT)
Dept: TRANSPLANT | Facility: CLINIC | Age: 69
End: 2024-02-23
Payer: MEDICARE

## 2024-02-23 NOTE — TELEPHONE ENCOUNTER
Called to get patient scheduled for an in person appointment with Dr. Maxwell. No answer voicemail left.

## 2024-02-26 ENCOUNTER — TELEPHONE (OUTPATIENT)
Dept: TRANSPLANT | Facility: CLINIC | Age: 69
End: 2024-02-26
Payer: MEDICARE

## 2024-02-26 ENCOUNTER — PATIENT MESSAGE (OUTPATIENT)
Dept: TRANSPLANT | Facility: CLINIC | Age: 69
End: 2024-02-26
Payer: MEDICARE

## 2024-02-26 DIAGNOSIS — Z94.4 S/P LIVER TRANSPLANT: Primary | ICD-10-CM

## 2024-02-26 RX ORDER — LANOLIN ALCOHOL/MO/W.PET/CERES
400 CREAM (GRAM) TOPICAL 2 TIMES DAILY
Qty: 60 TABLET | Refills: 11 | Status: SHIPPED | OUTPATIENT
Start: 2024-02-26

## 2024-02-26 NOTE — TELEPHONE ENCOUNTER
Sent a message to let patient know will hook labs to Wednesday's labs he is already having.    ----- Message from Thais Maxwell MD sent at 2/24/2024  5:03 PM CST -----  Repeat labs with ggt - please let patient know.

## 2024-02-26 NOTE — TELEPHONE ENCOUNTER
Sent message to let patient know  ----- Message from Xenia Bhakta PharmD sent at 2/26/2024  1:08 PM CST -----  Jamil Kwan,     It looks like the patient received the PCI in 12/26/2023 - cardiology also dropped a note same day saying the patient only needed DAPT x1 month with ASA and Clopidogrel and then transition to just clopidogrel and eliquis x1 year. Therefore, I dont believe the patient needs aspirin at this time, as he is covered with clopidogrel as the antiplatelet.     Let me know if you have any questions.     Thanks,   Xenia Bhakta, PharmD  [id number]   ----- Message -----  From: Aniya Amaya RN  Sent: 2/26/2024  10:33 AM CST  To: Abdominal Transplant Pharmacists    Would it be safe for patient to continue Aspirin, since he is on Plavix and Fish Oil?  I now he has heart failure, so I think he will be on the other medications long term.      Aniya

## 2024-02-27 NOTE — PROGRESS NOTES
[facility] Provider Note (Follow-up) Consult Note      HPI:     Patient reports breathing okay, denies current SOB with ADL. Ambulating to clinic today and pace slow, reports distance walked limited by leg pain rather than SOB. Mild SOB walking up ramp to clinic today              Patient sleeps on 1-2 number of pillows              Patient wakes up SOB, has to get out of bed, associated cough- denies              Palpitations - denies              Dizzy, light-headed, pre-syncope or syncope- dizziness/lightheadedness now resolved. Denies pre-syncope/syncope               Since discharge frequency of performing weights, home weight and weight change- weight now stabilized 213lbs              Other information felt pertinent to HPI: Mr. Tej Purdy is a 69 yo male with a PMHx of CASTILLO cirrhosis, cholangiocarcinoma s/p liver transplant in 2022 on tacrolimus, T2DM on insulin, HTN, CAD s/p LHC +PCI (12/23), severe AS, HFrEF with EF of 20-25%, persistent AF s/p several DCCV on apixaban, PAD who presents to third [facility] visit following multiple recent admissions for ADHF. Recently admitted to  on 01/25 for acute hypoxic respiratory failure in the setting of acute on chronic heart failure exacerbation likely due to recurrent atrial fibrillation for which he is now s/p ASHISH + successful cardioversion with IC on 01/26. He was diuresed with furosemide 40 IV BID during this brief hospital admission with appropriate response and was discharged the evening of 01/26. On most recent admission in ED found to be hypoxic w SpO2 87% on RA upon arrival in the ED with improvement after 2L NC, otherwise HDS. EKG without ST elevation. BNP >962. Trop negative. CXR with cardiomegaly with increased pulmonary venous pressure likely pulmonary edema/CHF. Admitted to  for acute hypoxic respiratory failure in the setting of acute on chronic congestive heart failure. TTE showed severe AS and EF 20-25%. GDMT limited by lower blood pressure.  Underwent dobutamine stress echo on 01/30 consistent with severe aortic stenosis. Evaluated by IC  and underwent balloon aortic valvuloplasty as bridge to TAVR or surgical AVR. Will need evaluation for possible AICD, maybe cardiac resynchronization therapy at some point in the next several months. Patient was adequately diuresed and discharged with supplemental O2.      2/16/24: last visit we reduced lasix from 40mg to 20mg daily to avoid overdiuresis. Today he has no acute complaints. Denies appreciable worsening SOB/BLE edema. Dizziness/lightheadedness/pre-syncope resolved.    2/28/24: Today he has no acute complaints. Denies worsening SOB/BLE edema. Taking lasix 20mg daily without issue. BP mildly elevated but has not yet taken his morning meds.     PHYSICAL:   Vitals:    02/28/24 1306   BP: (!) 145/69   Pulse: 62        @[id number](3)@    JVD: no   Heart rhythm: regular  Cardiac murmur: Yes     S3: no  S4: no  Lungs: clear  Hepatojugular reflux: no  Edema: no      Lab Results   Component Value Date     02/28/2024     02/28/2024    K 4.9 02/28/2024    K 4.9 02/28/2024    MG 1.9 02/28/2024     02/28/2024     02/28/2024    CO2 28 02/28/2024    CO2 28 02/28/2024    BUN 25 (H) 02/28/2024    BUN 25 (H) 02/28/2024    CREATININE 1.4 02/28/2024    CREATININE 1.4 02/28/2024     (H) 02/28/2024     (H) 02/28/2024    CALCIUM 9.2 02/28/2024    CALCIUM 9.2 02/28/2024    AST 20 02/28/2024    AST 20 02/28/2024    ALT 17 02/28/2024    ALT 17 02/28/2024    ALBUMIN 3.6 02/28/2024    ALBUMIN 3.6 02/28/2024    PROT 7.1 02/28/2024    PROT 7.1 02/28/2024    BILITOT 0.7 02/28/2024    BILITOT 0.7 02/28/2024     Lab Results   Component Value Date     (H) 02/28/2024     (H) 02/16/2024     (H) 02/09/2024       ASSESSMENT: HFrEF     PLAN:      Patient Instructions:   Instruct the patient to notify this clinic if HH, a physician or an advanced care provider wants to change  medication one of their HF medications   Activity and Diet restrictions:   Recommend 2-3 gram sodium restriction and 1500cc- 2000cc fluid restriction.  Encourage physical activity with graded exercise program.  Requested patient to weigh themselves daily, and to notify us if their weight increases by more than 3 lbs in 1 day or 5 lbs in 3 days.    Assigned dry weight on home scale: 213lbs  Medication changes (include current dose and changed dose): NYHA Class III symptoms. Well compensated on exam. Weight and BNP stabilized. Continue lasix 20mg daily. S/p BAV as bridge to possible TAVR. Otherwise on adequate GDMT: entresto 24/26mg BID, toprol 25mg daily, jardiance 10mg daily. Would benefit from addition of aldactone in future as tolerates to further optimize regimen.    Upcoming labs and date anticipated: 1 week phone check in with tentative phone dc     Other diagnostic tests ordered: will need close follow up with IC for possible TAVR. Appointment currently scheduled 3/27     Marisa Quijano PA-C

## 2024-02-28 ENCOUNTER — TELEPHONE (OUTPATIENT)
Dept: TRANSPLANT | Facility: CLINIC | Age: 69
End: 2024-02-28
Payer: MEDICARE

## 2024-02-28 ENCOUNTER — TELEPHONE (OUTPATIENT)
Dept: CARDIOLOGY | Facility: CLINIC | Age: 69
End: 2024-02-28
Payer: MEDICARE

## 2024-02-28 ENCOUNTER — OFFICE VISIT (OUTPATIENT)
Dept: CARDIOLOGY | Facility: CLINIC | Age: 69
End: 2024-02-28
Payer: MEDICARE

## 2024-02-28 ENCOUNTER — LAB VISIT (OUTPATIENT)
Dept: LAB | Facility: HOSPITAL | Age: 69
End: 2024-02-28
Payer: MEDICARE

## 2024-02-28 VITALS
HEIGHT: 72 IN | HEART RATE: 62 BPM | SYSTOLIC BLOOD PRESSURE: 145 MMHG | WEIGHT: 213.94 LBS | DIASTOLIC BLOOD PRESSURE: 69 MMHG | OXYGEN SATURATION: 96 % | BODY MASS INDEX: 28.98 KG/M2

## 2024-02-28 DIAGNOSIS — I25.10 CORONARY ARTERY DISEASE INVOLVING NATIVE CORONARY ARTERY OF NATIVE HEART WITHOUT ANGINA PECTORIS: Chronic | ICD-10-CM

## 2024-02-28 DIAGNOSIS — I35.0 AORTIC VALVE STENOSIS, ETIOLOGY OF CARDIAC VALVE DISEASE UNSPECIFIED: ICD-10-CM

## 2024-02-28 DIAGNOSIS — I50.20 HFREF (HEART FAILURE WITH REDUCED EJECTION FRACTION): Primary | ICD-10-CM

## 2024-02-28 DIAGNOSIS — E78.5 HYPERLIPIDEMIA, UNSPECIFIED HYPERLIPIDEMIA TYPE: ICD-10-CM

## 2024-02-28 DIAGNOSIS — I10 PRIMARY HYPERTENSION: ICD-10-CM

## 2024-02-28 DIAGNOSIS — E11.21 TYPE 2 DIABETES MELLITUS WITH DIABETIC NEPHROPATHY, WITH LONG-TERM CURRENT USE OF INSULIN: ICD-10-CM

## 2024-02-28 DIAGNOSIS — Z94.4 S/P LIVER TRANSPLANT: ICD-10-CM

## 2024-02-28 DIAGNOSIS — I48.19 PERSISTENT ATRIAL FIBRILLATION: ICD-10-CM

## 2024-02-28 DIAGNOSIS — R06.02 SOB (SHORTNESS OF BREATH): ICD-10-CM

## 2024-02-28 DIAGNOSIS — Z79.01 CURRENT USE OF ANTICOAGULANT THERAPY: ICD-10-CM

## 2024-02-28 DIAGNOSIS — I38 VALVULAR HEART DISEASE: ICD-10-CM

## 2024-02-28 DIAGNOSIS — Z79.4 TYPE 2 DIABETES MELLITUS WITH DIABETIC NEPHROPATHY, WITH LONG-TERM CURRENT USE OF INSULIN: ICD-10-CM

## 2024-02-28 LAB
ALBUMIN SERPL BCP-MCNC: 3.6 G/DL (ref 3.5–5.2)
ALBUMIN SERPL BCP-MCNC: 3.6 G/DL (ref 3.5–5.2)
ALP SERPL-CCNC: 89 U/L (ref 55–135)
ALP SERPL-CCNC: 89 U/L (ref 55–135)
ALT SERPL W/O P-5'-P-CCNC: 17 U/L (ref 10–44)
ALT SERPL W/O P-5'-P-CCNC: 17 U/L (ref 10–44)
ANION GAP SERPL CALC-SCNC: 10 MMOL/L (ref 8–16)
ANION GAP SERPL CALC-SCNC: 10 MMOL/L (ref 8–16)
AST SERPL-CCNC: 20 U/L (ref 10–40)
AST SERPL-CCNC: 20 U/L (ref 10–40)
BASOPHILS # BLD AUTO: 0.07 K/UL (ref 0–0.2)
BASOPHILS NFR BLD: 1.1 % (ref 0–1.9)
BILIRUB SERPL-MCNC: 0.7 MG/DL (ref 0.1–1)
BILIRUB SERPL-MCNC: 0.7 MG/DL (ref 0.1–1)
BNP SERPL-MCNC: 173 PG/ML (ref 0–99)
BUN SERPL-MCNC: 25 MG/DL (ref 8–23)
BUN SERPL-MCNC: 25 MG/DL (ref 8–23)
CALCIUM SERPL-MCNC: 9.2 MG/DL (ref 8.7–10.5)
CALCIUM SERPL-MCNC: 9.2 MG/DL (ref 8.7–10.5)
CHLORIDE SERPL-SCNC: 103 MMOL/L (ref 95–110)
CHLORIDE SERPL-SCNC: 103 MMOL/L (ref 95–110)
CO2 SERPL-SCNC: 28 MMOL/L (ref 23–29)
CO2 SERPL-SCNC: 28 MMOL/L (ref 23–29)
CREAT SERPL-MCNC: 1.4 MG/DL (ref 0.5–1.4)
CREAT SERPL-MCNC: 1.4 MG/DL (ref 0.5–1.4)
DIFFERENTIAL METHOD BLD: ABNORMAL
EOSINOPHIL # BLD AUTO: 0.1 K/UL (ref 0–0.5)
EOSINOPHIL NFR BLD: 2.2 % (ref 0–8)
ERYTHROCYTE [DISTWIDTH] IN BLOOD BY AUTOMATED COUNT: 25.6 % (ref 11.5–14.5)
EST. GFR  (NO RACE VARIABLE): 54.1 ML/MIN/1.73 M^2
EST. GFR  (NO RACE VARIABLE): 54.1 ML/MIN/1.73 M^2
GGT SERPL-CCNC: 80 U/L (ref 8–55)
GLUCOSE SERPL-MCNC: 186 MG/DL (ref 70–110)
GLUCOSE SERPL-MCNC: 186 MG/DL (ref 70–110)
HCT VFR BLD AUTO: 41.5 % (ref 40–54)
HGB BLD-MCNC: 11.2 G/DL (ref 14–18)
IMM GRANULOCYTES # BLD AUTO: 0.06 K/UL (ref 0–0.04)
IMM GRANULOCYTES NFR BLD AUTO: 0.9 % (ref 0–0.5)
LYMPHOCYTES # BLD AUTO: 0.8 K/UL (ref 1–4.8)
LYMPHOCYTES NFR BLD: 11.5 % (ref 18–48)
MAGNESIUM SERPL-MCNC: 1.9 MG/DL (ref 1.6–2.6)
MAGNESIUM SERPL-MCNC: 1.9 MG/DL (ref 1.6–2.6)
MCH RBC QN AUTO: 20.5 PG (ref 27–31)
MCHC RBC AUTO-ENTMCNC: 27 G/DL (ref 32–36)
MCV RBC AUTO: 76 FL (ref 82–98)
MONOCYTES # BLD AUTO: 0.6 K/UL (ref 0.3–1)
MONOCYTES NFR BLD: 9.8 % (ref 4–15)
NEUTROPHILS # BLD AUTO: 4.8 K/UL (ref 1.8–7.7)
NEUTROPHILS NFR BLD: 74.5 % (ref 38–73)
NRBC BLD-RTO: 0 /100 WBC
PHOSPHATE SERPL-MCNC: 3.2 MG/DL (ref 2.7–4.5)
PLATELET # BLD AUTO: 149 K/UL (ref 150–450)
PMV BLD AUTO: ABNORMAL FL (ref 9.2–12.9)
POTASSIUM SERPL-SCNC: 4.9 MMOL/L (ref 3.5–5.1)
POTASSIUM SERPL-SCNC: 4.9 MMOL/L (ref 3.5–5.1)
PROT SERPL-MCNC: 7.1 G/DL (ref 6–8.4)
PROT SERPL-MCNC: 7.1 G/DL (ref 6–8.4)
RBC # BLD AUTO: 5.47 M/UL (ref 4.6–6.2)
SODIUM SERPL-SCNC: 141 MMOL/L (ref 136–145)
SODIUM SERPL-SCNC: 141 MMOL/L (ref 136–145)
WBC # BLD AUTO: 6.5 K/UL (ref 3.9–12.7)

## 2024-02-28 PROCEDURE — 80053 COMPREHEN METABOLIC PANEL: CPT | Performed by: INTERNAL MEDICINE

## 2024-02-28 PROCEDURE — 85025 COMPLETE CBC W/AUTO DIFF WBC: CPT | Performed by: INTERNAL MEDICINE

## 2024-02-28 PROCEDURE — 36415 COLL VENOUS BLD VENIPUNCTURE: CPT

## 2024-02-28 PROCEDURE — 99214 OFFICE O/P EST MOD 30 MIN: CPT | Mod: S$GLB,,,

## 2024-02-28 PROCEDURE — 83735 ASSAY OF MAGNESIUM: CPT | Mod: 91

## 2024-02-28 PROCEDURE — 80197 ASSAY OF TACROLIMUS: CPT | Performed by: INTERNAL MEDICINE

## 2024-02-28 PROCEDURE — 83880 ASSAY OF NATRIURETIC PEPTIDE: CPT

## 2024-02-28 PROCEDURE — 82977 ASSAY OF GGT: CPT | Performed by: INTERNAL MEDICINE

## 2024-02-28 PROCEDURE — 84100 ASSAY OF PHOSPHORUS: CPT

## 2024-02-28 PROCEDURE — 99999 PR PBB SHADOW E&M-EST. PATIENT-LVL IV: CPT | Mod: PBBFAC,,,

## 2024-02-28 PROCEDURE — 83735 ASSAY OF MAGNESIUM: CPT | Performed by: INTERNAL MEDICINE

## 2024-02-28 RX ORDER — FUROSEMIDE 20 MG/1
20 TABLET ORAL DAILY
Qty: 45 TABLET | Refills: 11 | Status: SHIPPED | OUTPATIENT
Start: 2024-02-28 | End: 2024-05-07 | Stop reason: SDUPTHER

## 2024-02-28 NOTE — TELEPHONE ENCOUNTER
Call to PT to advise of POC Changes as follows;    Can we call and let him know labs look good? Electrolytes and kidney function normal. BNP looks great, and then see if they were able to find out what dose of lasix he has been taking? We'll do phone check in next week and dc     Will do , wife just called and said Lasix dose is 20 mg/Daily     Perfect, continue current dose     [facility] weekly & D/C call set for Wed March 6th

## 2024-02-28 NOTE — TELEPHONE ENCOUNTER
Left a v/m asking if he would like a rescheduled appt for 3.15.2024 at [facility]. If he can or cannot I asked that he call his nurse coord

## 2024-02-29 LAB — TACROLIMUS BLD-MCNC: 7.7 NG/ML (ref 5–15)

## 2024-03-05 ENCOUNTER — TELEPHONE (OUTPATIENT)
Dept: TRANSPLANT | Facility: CLINIC | Age: 69
End: 2024-03-05
Payer: MEDICARE

## 2024-03-05 ENCOUNTER — PATIENT MESSAGE (OUTPATIENT)
Dept: TRANSPLANT | Facility: CLINIC | Age: 69
End: 2024-03-05
Payer: MEDICARE

## 2024-03-05 NOTE — TELEPHONE ENCOUNTER
Sent patient message via portal to let him know labs are stable.  No medication changes, next labs due on 5/27/24      ----- Message from Thais Maxwell MD sent at 3/5/2024 12:48 PM CST -----  The Labs are stable - please let patient know.

## 2024-03-06 ENCOUNTER — TELEPHONE (OUTPATIENT)
Dept: CARDIOLOGY | Facility: CLINIC | Age: 69
End: 2024-03-06
Payer: MEDICARE

## 2024-03-06 NOTE — TELEPHONE ENCOUNTER
"[facility]) DISCHARGE VISIT - PHONE     Called and spoke arthur PT    Most Recent Hospital Discharge Date: 2-2-24  Last admission Diagnosis/chief complaint: SOB    Pt discharge completed by phone related to PT preference  Wt-216 lbs  BP-146/67  P-48    Pt reports the following:  []  Shortness of Breath with activity  []  Shortness of Breath at rest   []  Fatigue  []  Edema   [] Chest pain or tightness  [] Weight Increase since discharge  [x] None of the above    Medications:   Medication reconciliation completed today per RN.  Pt reports having all medications available and understands how to take them appropriately. Reminded pt to call prior to making any changes to medications. No refills needed    Education:   [x] Confirmed pt still has  "[facility] Home Care Guide" .   Reviewed key points as listed below.     Recommend 2 gram sodium restriction and 1500cc fluid restriction.  Encourage physical activity with graded exercise program.  Requested patient to weigh themselves daily, and to notify us if their weight increases by more than 3 lbs in 1 day or 5 lbs in 3 days.     [x] Reviewed completed "Daily Weight and Symptom Tracker".  Reviewed with patient when and how to call HFT[facility] according to "Yellow Zone" and "Red Zone".       Watch for these Signs and Symptoms: If any of these occur, contact [facility] immediately:   Increase in shortness of breath with movement   Increase in swelling in your legs and ankles   Weight gain of more than 3 pounds in a night or 5 pounds in 3 days.   Difficulty breathing when you are lying down   Worsening fatigue or tiredness   Stomach bloating, a full feeling or a loss of appetite   Increased coughing--especially when you are lying down    MyChart and Care Companion:   Patient active on myChart? Yes, patient uses regularly.    HF TCC Program Plan:  Pt has successfully completed HFT[facility] program.  Pt care to be transferred to Dr." JUANITO Zuñiga for long term care.     Pt educated on how to call their offices and how to call [facility] On call in the event of an after hour issue.    PT reminded to continue to follow recommendations made during the [facility] program to include monitoring daily weights, taking medications according to list, following up to appointments per provider recommendations, stop smoking/ start exercising and following a heart friendly low salt, low fluid diet.      Pt was able to verbalize back to RN in their own words correct diet/fluid restrictions, necessity for exercise, warning signs and symptoms, when and how to contact their  Long term care team .      Plan:   Message to Dr. YODIT Zuñiga's pool for F/U appt. Certificate of Completion to be mailed to PT      [x]  Discussed upcoming appointments and/or plan for follow-up care with his/her PCP/Cardiology March appts reviewed with PT    Electronic hand off completed : To  Dr. YODIT Zuñiga by routing epic note per Marisa Quijano PA-C.     Please refer to provider note for additional details and assessment.

## 2024-03-06 NOTE — TELEPHONE ENCOUNTER
"[facility]    Attempted to call pt to complete 1 week "check in"  and discharge phone call. Unable to reach pt at listed phone numbers.  Was able to leave message on voicemail encouraging pt to return call with [facility] phone number.. JUSTINE on Aniya's phone.    Will continue to try to reach patient.    "

## 2024-03-21 ENCOUNTER — TELEPHONE (OUTPATIENT)
Dept: TRANSPLANT | Facility: CLINIC | Age: 69
End: 2024-03-21
Payer: MEDICARE

## 2024-04-07 NOTE — ANESTHESIA PROCEDURE NOTES
Arterial    Diagnosis: HCC    Patient location during procedure: done in OR    Staffing  Authorizing Provider: Doug Dominguez MD  Performing Provider: Jd Membreno MD    Anesthesiologist was present at the time of the procedure.    Preanesthetic Checklist  Completed: patient identified, IV checked, site marked, risks and benefits discussed, surgical consent, monitors and equipment checked, pre-op evaluation, timeout performed and anesthesia consent givenArterial  Skin Prep: chlorhexidine gluconate and isopropyl alcohol  Local Infiltration: none  Orientation: left  Location: femoral    Catheter Size: 4 Fr Cook  Catheter placement by Ultrasound guidance. Heme positive aspiration all ports.  Vessel Caliber: small, patent, compressibility normal  Needle advanced into vessel with real time Ultrasound guidance.  Guidewire confirmed in vessel.  Sterile sheath used.Insertion Attempts: 2  Assessment  Dressing: sutured in place and taped  Patient: Tolerated well  Additional Notes  Initial attempt at right femoral, able to cannulate with needle but guidewire difficult to thread secondary to calcification. Left side placement uneventful.                 patient

## 2024-04-08 ENCOUNTER — TELEPHONE (OUTPATIENT)
Dept: TRANSPLANT | Facility: CLINIC | Age: 69
End: 2024-04-08

## 2024-04-08 ENCOUNTER — PATIENT MESSAGE (OUTPATIENT)
Dept: TRANSPLANT | Facility: CLINIC | Age: 69
End: 2024-04-08
Payer: MEDICARE

## 2024-04-08 ENCOUNTER — OFFICE VISIT (OUTPATIENT)
Dept: TRANSPLANT | Facility: CLINIC | Age: 69
End: 2024-04-08
Payer: MEDICARE

## 2024-04-08 VITALS
DIASTOLIC BLOOD PRESSURE: 79 MMHG | HEIGHT: 72 IN | HEART RATE: 64 BPM | SYSTOLIC BLOOD PRESSURE: 179 MMHG | WEIGHT: 219.38 LBS | RESPIRATION RATE: 19 BRPM | OXYGEN SATURATION: 96 % | BODY MASS INDEX: 29.71 KG/M2

## 2024-04-08 DIAGNOSIS — Z12.11 ENCOUNTER FOR COLORECTAL CANCER SCREENING: ICD-10-CM

## 2024-04-08 DIAGNOSIS — Z94.4 S/P LIVER TRANSPLANT: Chronic | ICD-10-CM

## 2024-04-08 DIAGNOSIS — C44.90 SKIN CANCER: ICD-10-CM

## 2024-04-08 DIAGNOSIS — Z91.89 AT RISK FOR OPPORTUNISTIC INFECTIONS: Primary | ICD-10-CM

## 2024-04-08 DIAGNOSIS — Z12.12 ENCOUNTER FOR COLORECTAL CANCER SCREENING: ICD-10-CM

## 2024-04-08 DIAGNOSIS — C22.1 CHOLANGIOCARCINOMA: Primary | ICD-10-CM

## 2024-04-08 DIAGNOSIS — C22.1 CHOLANGIOCARCINOMA: Chronic | ICD-10-CM

## 2024-04-08 DIAGNOSIS — Z29.89 PROPHYLACTIC IMMUNOTHERAPY: ICD-10-CM

## 2024-04-08 DIAGNOSIS — Z94.4 HISTORY OF LIVER TRANSPLANT: ICD-10-CM

## 2024-04-08 PROCEDURE — 4010F ACE/ARB THERAPY RXD/TAKEN: CPT | Mod: CPTII,S$GLB,, | Performed by: INTERNAL MEDICINE

## 2024-04-08 PROCEDURE — 3078F DIAST BP <80 MM HG: CPT | Mod: CPTII,S$GLB,, | Performed by: INTERNAL MEDICINE

## 2024-04-08 PROCEDURE — 99215 OFFICE O/P EST HI 40 MIN: CPT | Mod: S$GLB,,, | Performed by: INTERNAL MEDICINE

## 2024-04-08 PROCEDURE — 3051F HG A1C>EQUAL 7.0%<8.0%: CPT | Mod: CPTII,S$GLB,, | Performed by: INTERNAL MEDICINE

## 2024-04-08 PROCEDURE — 1126F AMNT PAIN NOTED NONE PRSNT: CPT | Mod: CPTII,S$GLB,, | Performed by: INTERNAL MEDICINE

## 2024-04-08 PROCEDURE — 1159F MED LIST DOCD IN RCRD: CPT | Mod: CPTII,S$GLB,, | Performed by: INTERNAL MEDICINE

## 2024-04-08 PROCEDURE — 1160F RVW MEDS BY RX/DR IN RCRD: CPT | Mod: CPTII,S$GLB,, | Performed by: INTERNAL MEDICINE

## 2024-04-08 PROCEDURE — 99999 PR PBB SHADOW E&M-EST. PATIENT-LVL V: CPT | Mod: PBBFAC,,, | Performed by: INTERNAL MEDICINE

## 2024-04-08 PROCEDURE — 3008F BODY MASS INDEX DOCD: CPT | Mod: CPTII,S$GLB,, | Performed by: INTERNAL MEDICINE

## 2024-04-08 PROCEDURE — 3077F SYST BP >= 140 MM HG: CPT | Mod: CPTII,S$GLB,, | Performed by: INTERNAL MEDICINE

## 2024-04-08 PROCEDURE — 3288F FALL RISK ASSESSMENT DOCD: CPT | Mod: CPTII,S$GLB,, | Performed by: INTERNAL MEDICINE

## 2024-04-08 PROCEDURE — 1101F PT FALLS ASSESS-DOCD LE1/YR: CPT | Mod: CPTII,S$GLB,, | Performed by: INTERNAL MEDICINE

## 2024-04-08 NOTE — LETTER
April 8, 2024        Jessika Carbone  [address] [facility] [address]  Phone: [phone]  Fax: [phone]             [place] [facility]  [address] [address]  Phone: [phone]  Fax: [phone]   Patient: Tej Purdy   MR Number: [id number]   YOB: 1955   Date of Visit: 4/8/2024       Dear Dr. Jessika Carbone    Thank you for referring Tej Purdy to me for evaluation. Attached you will find relevant portions of my assessment and plan of care.    If you have questions, please do not hesitate to call me. I look forward to following Tej Purdy along with you.    Sincerely,    Thais Maxwell MD    Enclosure    If you would like to receive this communication electronically, please contact [email or web address] or [phone] to request [employer] access.    [employer] is a tool which provides read-only access to select patient information with whom you have a relationship. Its easy to use and provides real time access to review your patients record including encounter summaries, notes, results, and demographic information.    If you feel you have received this communication in error or would no longer like to receive these types of communications, please e-mail [email or web address]

## 2024-04-08 NOTE — PROGRESS NOTES
Transplant Hepatology  Liver Transplant Recipient Follow-up    Transplant Date: 1/6/2022  UNOS Native Liver Dx: Primary Liver Malignancy: Hepatoma (HCC) (final path revealed cholangiocarcinoma) and Cirrhosis    Tej is here for follow up of his liver transplant.    ORGAN: LIVER  Whole or Partial: whole liver  Donor Type: donation after brain death  CDC High Risk: no  Donor CMV Status: Positive  Donor HCV Status: Negative  Donor HBcAb: Negative  Donor HBV JAYLIN:   Donor HCV JAYLIN: Negative  Biliary Anastomosis: end to end  Arterial Anatomy: replaced left hepatic from left gastric  IVC reconstruction: end to end ivc  Portal vein status: patent    EXPLANT: 7 cm tumor cholangiocarcinoma, necrotic, no vascular invasion, cirrhosis, 1 lymph node negative    He has had the following complications since transplant: none.     Subjective:     Interval History: Tej is now 2 years post liver transplant. Currently, he is doing well. He has had several admissions for heart failure and underwent a TAVR on 2/1/24. He now feels the best he has felt since liver transplant. He was also admitted for suicidal ideation. He is now improved on zoloft/wellbutrin/seroquel.     Allograft function 2/28/24: ALT 17, AST 20, ALKP 89, Tbil 0.7, creat 1.4, prograf 7.7, CA 19-9 no recent value  IS: prograf   Immunoprophylaxis:  Aspirin: YES/NO(WHY) DOSE: 81 mg   Apixaban for AFIB    Abdo US doppler 12/23/23: satisfactory doppler; mild intrahepatic duct dilation left lobe  Hx cholangiocarcinoma: saw oncology: adjuvant chemo deferred   Ct abdo w contrast 9/18/23: no obvious recurrence of cholangio;  Ct chest wo contrast 9/18/23: no metastases    Health maintenance  --colonoscopy-repeat 2023  --bone density 3/14/23: Osteopenia; Daily calcium intake 1499-9249 mg, dietary sources preferred; Vitamin D 2958-8601 IU daily; Weight bearing exercise and fall precautions; Repeat BMD in 2 years; repeat bone density 3/25  Dermatology: ND    Review of Systems    HENT: Negative.     Eyes: Negative.    Respiratory: Negative.     Cardiovascular: Negative.    Genitourinary: Negative.    Musculoskeletal: Negative.    Skin: Negative.    Psychiatric/Behavioral: Negative.         Objective:     Vitals:    04/08/24 1556   BP: (!) 179/79   Pulse: 64   Resp: 19       Physical Exam  Constitutional:       Appearance: Normal appearance.   Eyes:      General: No scleral icterus.  Cardiovascular:      Rate and Rhythm: Normal rate and regular rhythm.   Pulmonary:      Effort: Pulmonary effort is normal.   Abdominal:      General: Abdomen is flat. There is no distension.      Palpations: Abdomen is soft. There is no mass.      Tenderness: There is no abdominal tenderness.   Musculoskeletal:         General: No swelling.   Skin:     General: Skin is warm and dry.   Neurological:      Mental Status: He is alert and oriented to person, place, and time.   Psychiatric:         Mood and Affect: Mood normal.            Lab Results   Component Value Date    BILITOT 0.7 02/28/2024    BILITOT 0.7 02/28/2024    AST 20 02/28/2024    AST 20 02/28/2024    ALT 17 02/28/2024    ALT 17 02/28/2024    ALKPHOS 89 02/28/2024    ALKPHOS 89 02/28/2024    CREATININE 1.4 02/28/2024    CREATININE 1.4 02/28/2024    ALBUMIN 3.6 02/28/2024    ALBUMIN 3.6 02/28/2024     Lab Results   Component Value Date    WBC 6.50 02/28/2024    HGB 11.2 (L) 02/28/2024    HCT 41.5 02/28/2024    HCT 33 (L) 01/06/2022     (L) 02/28/2024     Lab Results   Component Value Date    TACROLIMUS 7.7 02/28/2024       Assessment/Plan:   The patient is a 68 year old male who is now 2 years post liver transplant. He has good allograft function. Recent severe depression-now improved. Heart failure now s/p TAVR. Current recommendations:  1. S/p liver transplant and control of IS: good allograft function, prograf target 4-6;  2. Prophylaxis:  Aspirin: YES/NO(WHY) DOSE: 81 mg   Eliquis  3. Cholangiocarcinoma: chemotx deferred; surveillance  imaging and cea/ca19-9 every 3 months (overdue)  4. Ascites: resolved- continue off lasix  5.  Health maintenance: the patient should see a dermatologist annually to screen for skin cancer (has a hx of skin cancer) (overdue), perform regular colonoscopies (due 2023-overdue)  6. R/O osteoporosis. I am recommending a repeat bone density to r/o osteoporosis in 2025  7. Heart failure: F/u with cardiology     Return 6 months  A total of 60 minutes was spent reviewing the patient's chart, examining the patient, reviewing labs and imaging and coordinating care with the patient's care team.        Thais Maxwell MD           [facility] Patient Status  Functional Status: 70% - Cares for self: unable to carry on normal activity or active work  Physical Capacity: No Limitations    New diabetes onset between last follow-up to the current follow-up: No  Did patient have any acute rejection episodes during the follow-up period: No  Post transplant malignancy: No

## 2024-04-08 NOTE — PATIENT INSTRUCTIONS
Continue prograf with a  target of 4-6  See dermatology at least annually  Colonoscopy now  Bone density 2025  F/u cardiology  Cholangioca hx- needs scans and CA 19-9; overdue for scans  Return 6 months

## 2024-04-09 ENCOUNTER — TELEPHONE (OUTPATIENT)
Dept: TRANSPLANT | Facility: CLINIC | Age: 69
End: 2024-04-09
Payer: MEDICARE

## 2024-04-09 NOTE — TELEPHONE ENCOUNTER
----- Message from Deisy Joshi sent at 4/9/2024  4:28 PM CDT -----  Regarding: r/s imaging sched 4/10  Contact: PT  [phone]   called requesting to r/s imaging scheduled for tomorrow. There is concern for bad weather coming in. Please reach out to r/s appt  No further information provided    Patient can be contacted @# [phone]

## 2024-04-12 ENCOUNTER — HOSPITAL ENCOUNTER (OUTPATIENT)
Dept: RADIOLOGY | Facility: HOSPITAL | Age: 69
Discharge: HOME | End: 2024-04-12
Attending: INTERNAL MEDICINE
Payer: MEDICARE

## 2024-04-12 DIAGNOSIS — C22.1 CHOLANGIOCARCINOMA: ICD-10-CM

## 2024-04-12 LAB
CREAT SERPL-MCNC: 1.4 MG/DL (ref 0.5–1.4)
SAMPLE: NORMAL

## 2024-04-12 PROCEDURE — 25500020 PHARM REV CODE 255: Performed by: INTERNAL MEDICINE

## 2024-04-12 PROCEDURE — 71250 CT THORAX DX C-: CPT | Mod: 26,,, | Performed by: STUDENT IN AN ORGANIZED HEALTH CARE EDUCATION/TRAINING PROGRAM

## 2024-04-12 PROCEDURE — 74160 CT ABDOMEN W/CONTRAST: CPT | Mod: TC

## 2024-04-12 PROCEDURE — 71250 CT THORAX DX C-: CPT | Mod: TC

## 2024-04-12 PROCEDURE — 74160 CT ABDOMEN W/CONTRAST: CPT | Mod: 26,,, | Performed by: RADIOLOGY

## 2024-04-12 RX ADMIN — IOHEXOL 100 ML: 350 INJECTION, SOLUTION INTRAVENOUS at 07:04

## 2024-04-15 ENCOUNTER — LAB VISIT (OUTPATIENT)
Dept: LAB | Facility: HOSPITAL | Age: 69
End: 2024-04-15
Attending: INTERNAL MEDICINE
Payer: MEDICARE

## 2024-04-15 ENCOUNTER — TELEPHONE (OUTPATIENT)
Dept: TRANSPLANT | Facility: CLINIC | Age: 69
End: 2024-04-15
Payer: MEDICARE

## 2024-04-15 ENCOUNTER — PATIENT MESSAGE (OUTPATIENT)
Dept: TRANSPLANT | Facility: CLINIC | Age: 69
End: 2024-04-15
Payer: MEDICARE

## 2024-04-15 DIAGNOSIS — C22.1 CHOLANGIOCARCINOMA: ICD-10-CM

## 2024-04-15 DIAGNOSIS — I35.0 AORTIC VALVE STENOSIS, ETIOLOGY OF CARDIAC VALVE DISEASE UNSPECIFIED: ICD-10-CM

## 2024-04-15 LAB
CANCER AG19-9 SERPL-ACNC: <2.1 U/ML (ref 0–40)
CEA SERPL-MCNC: <1.7 NG/ML (ref 0–5)
CREAT SERPL-MCNC: 1.4 MG/DL (ref 0.5–1.4)
EST. GFR  (NO RACE VARIABLE): 54.1 ML/MIN/1.73 M^2

## 2024-04-15 PROCEDURE — 82378 CARCINOEMBRYONIC ANTIGEN: CPT | Performed by: INTERNAL MEDICINE

## 2024-04-15 PROCEDURE — 86301 IMMUNOASSAY TUMOR CA 19-9: CPT | Performed by: INTERNAL MEDICINE

## 2024-04-15 PROCEDURE — 82565 ASSAY OF CREATININE: CPT | Performed by: INTERNAL MEDICINE

## 2024-04-15 PROCEDURE — 36415 COLL VENOUS BLD VENIPUNCTURE: CPT | Mod: PN | Performed by: INTERNAL MEDICINE

## 2024-04-15 NOTE — TELEPHONE ENCOUNTER
Sent message to let patient know.    ----- Message from Thais Maxwell MD sent at 4/14/2024  6:16 PM CDT -----  No recurrence of cholangiocarcinoma

## 2024-04-17 ENCOUNTER — PATIENT MESSAGE (OUTPATIENT)
Dept: ELECTROPHYSIOLOGY | Facility: CLINIC | Age: 69
End: 2024-04-17
Payer: MEDICARE

## 2024-04-17 DIAGNOSIS — I48.19 PERSISTENT ATRIAL FIBRILLATION: Primary | Chronic | ICD-10-CM

## 2024-04-25 ENCOUNTER — PATIENT MESSAGE (OUTPATIENT)
Dept: TRANSPLANT | Facility: CLINIC | Age: 69
End: 2024-04-25
Payer: MEDICARE

## 2024-04-25 ENCOUNTER — TELEPHONE (OUTPATIENT)
Dept: TRANSPLANT | Facility: CLINIC | Age: 69
End: 2024-04-25
Payer: MEDICARE

## 2024-04-25 NOTE — TELEPHONE ENCOUNTER
Sent message to let patient know    ----- Message from Thais Maxwell MD sent at 4/25/2024 12:57 PM CDT -----  Tumor markers are negative- please let patient know.

## 2024-04-30 DIAGNOSIS — I50.9 CHF (CONGESTIVE HEART FAILURE): ICD-10-CM

## 2024-04-30 RX ORDER — SACUBITRIL AND VALSARTAN 24; 26 MG/1; MG/1
1 TABLET, FILM COATED ORAL 2 TIMES DAILY
Qty: 60 TABLET | Refills: 2 | Status: CANCELLED | OUTPATIENT
Start: 2024-04-30

## 2024-05-02 DIAGNOSIS — I50.9 CHF (CONGESTIVE HEART FAILURE): ICD-10-CM

## 2024-05-02 RX ORDER — SACUBITRIL AND VALSARTAN 24; 26 MG/1; MG/1
1 TABLET, FILM COATED ORAL 2 TIMES DAILY
Qty: 60 TABLET | Refills: 2 | Status: CANCELLED | OUTPATIENT
Start: 2024-04-30

## 2024-05-03 ENCOUNTER — TELEPHONE (OUTPATIENT)
Dept: TRANSPLANT | Facility: CLINIC | Age: 69
End: 2024-05-03
Payer: MEDICARE

## 2024-05-03 ENCOUNTER — PATIENT MESSAGE (OUTPATIENT)
Dept: TRANSPLANT | Facility: CLINIC | Age: 69
End: 2024-05-03
Payer: MEDICARE

## 2024-05-03 DIAGNOSIS — I50.9 CHF (CONGESTIVE HEART FAILURE): ICD-10-CM

## 2024-05-03 NOTE — TELEPHONE ENCOUNTER
Sent message to let patient know.    ----- Message from Marisa Quijano PA-C sent at 5/3/2024  3:17 PM CDT -----  Will reorder, but pt needs to reestablish care with [facility] for ongoing prescriptions.    Thanks,  Marisa  ----- Message -----  From: Aniya Amaya RN  Sent: 5/3/2024   3:13 PM CDT  To: Marisa Quijano PA-C; S[facility] Staff    Patient needs refills on his Entresto.    Thanks

## 2024-05-06 ENCOUNTER — TELEPHONE (OUTPATIENT)
Dept: CARDIOLOGY | Facility: CLINIC | Age: 69
End: 2024-05-06
Payer: MEDICARE

## 2024-05-06 PROBLEM — J96.01 ACUTE HYPOXIC RESPIRATORY FAILURE: Status: RESOLVED | Noted: 2024-01-27 | Resolved: 2024-05-06

## 2024-05-06 NOTE — TELEPHONE ENCOUNTER
----- Message from Manda Puckett, RN sent at 5/3/2024  3:28 PM CDT -----  Regarding: FW: Appointment      ----- Message -----  From: Marisa Quijano PA-C  Sent: 5/3/2024   3:20 PM CDT  To: [facility] Cardiology Clinical Support Staff  Subject: Appointment                                      Hi team,    Would you all mind getting patient scheduled for soonest available appointment?    ThanksMarisa

## 2024-05-07 ENCOUNTER — OFFICE VISIT (OUTPATIENT)
Dept: CARDIOLOGY | Facility: CLINIC | Age: 69
End: 2024-05-07
Payer: MEDICARE

## 2024-05-07 VITALS
HEIGHT: 72 IN | HEART RATE: 93 BPM | BODY MASS INDEX: 29.62 KG/M2 | DIASTOLIC BLOOD PRESSURE: 80 MMHG | WEIGHT: 218.69 LBS | SYSTOLIC BLOOD PRESSURE: 140 MMHG | OXYGEN SATURATION: 93 %

## 2024-05-07 DIAGNOSIS — I50.9 CHF (CONGESTIVE HEART FAILURE): ICD-10-CM

## 2024-05-07 DIAGNOSIS — E78.00 PURE HYPERCHOLESTEROLEMIA: ICD-10-CM

## 2024-05-07 DIAGNOSIS — Z95.2 S/P TAVR (TRANSCATHETER AORTIC VALVE REPLACEMENT): Primary | ICD-10-CM

## 2024-05-07 PROBLEM — Z95.3 S/P TAVR (TRANSCATHETER AORTIC VALVE REPLACEMENT): Status: ACTIVE | Noted: 2024-05-07

## 2024-05-07 PROCEDURE — 4010F ACE/ARB THERAPY RXD/TAKEN: CPT | Mod: CPTII,S$GLB,, | Performed by: INTERNAL MEDICINE

## 2024-05-07 PROCEDURE — 3077F SYST BP >= 140 MM HG: CPT | Mod: CPTII,S$GLB,, | Performed by: INTERNAL MEDICINE

## 2024-05-07 PROCEDURE — 1126F AMNT PAIN NOTED NONE PRSNT: CPT | Mod: CPTII,S$GLB,, | Performed by: INTERNAL MEDICINE

## 2024-05-07 PROCEDURE — 1101F PT FALLS ASSESS-DOCD LE1/YR: CPT | Mod: CPTII,S$GLB,, | Performed by: INTERNAL MEDICINE

## 2024-05-07 PROCEDURE — 3051F HG A1C>EQUAL 7.0%<8.0%: CPT | Mod: CPTII,S$GLB,, | Performed by: INTERNAL MEDICINE

## 2024-05-07 PROCEDURE — 3288F FALL RISK ASSESSMENT DOCD: CPT | Mod: CPTII,S$GLB,, | Performed by: INTERNAL MEDICINE

## 2024-05-07 PROCEDURE — 3008F BODY MASS INDEX DOCD: CPT | Mod: CPTII,S$GLB,, | Performed by: INTERNAL MEDICINE

## 2024-05-07 PROCEDURE — 3079F DIAST BP 80-89 MM HG: CPT | Mod: CPTII,S$GLB,, | Performed by: INTERNAL MEDICINE

## 2024-05-07 PROCEDURE — 99214 OFFICE O/P EST MOD 30 MIN: CPT | Mod: S$GLB,,, | Performed by: INTERNAL MEDICINE

## 2024-05-07 PROCEDURE — 1159F MED LIST DOCD IN RCRD: CPT | Mod: CPTII,S$GLB,, | Performed by: INTERNAL MEDICINE

## 2024-05-07 PROCEDURE — 99999 PR PBB SHADOW E&M-EST. PATIENT-LVL V: CPT | Mod: PBBFAC,,, | Performed by: INTERNAL MEDICINE

## 2024-05-07 RX ORDER — FUROSEMIDE 20 MG/1
20 TABLET ORAL DAILY
Qty: 90 TABLET | Refills: 3 | Status: ACTIVE | OUTPATIENT
Start: 2024-05-07 | End: 2025-05-07

## 2024-05-07 RX ORDER — CLOPIDOGREL BISULFATE 75 MG/1
75 TABLET ORAL DAILY
Qty: 90 TABLET | Refills: 3 | Status: ACTIVE | OUTPATIENT
Start: 2024-05-07 | End: 2025-05-07

## 2024-05-07 RX ORDER — CARVEDILOL 12.5 MG/1
12.5 TABLET ORAL 2 TIMES DAILY WITH MEALS
Qty: 180 TABLET | Refills: 3 | Status: ACTIVE | OUTPATIENT
Start: 2024-05-07 | End: 2025-05-07

## 2024-05-07 RX ORDER — ATORVASTATIN CALCIUM 80 MG/1
80 TABLET, FILM COATED ORAL DAILY
Qty: 90 TABLET | Refills: 3 | Status: ACTIVE | OUTPATIENT
Start: 2024-05-07 | End: 2025-05-07

## 2024-05-07 RX ORDER — CARVEDILOL 12.5 MG/1
12.5 TABLET ORAL 2 TIMES DAILY WITH MEALS
Qty: 180 TABLET | Refills: 3 | Status: SHIPPED | OUTPATIENT
Start: 2024-05-07 | End: 2024-05-07 | Stop reason: SDUPTHER

## 2024-05-07 RX ORDER — SACUBITRIL AND VALSARTAN 49; 51 MG/1; MG/1
1 TABLET, FILM COATED ORAL 2 TIMES DAILY
Qty: 180 TABLET | Refills: 3 | Status: ACTIVE | OUTPATIENT
Start: 2024-05-07

## 2024-05-07 NOTE — PROGRESS NOTES
Subjective:    Patient ID:  Tej Purdy is a 68 y.o. male who presents for follow-up of post TAVAR atrial fibrillation    HPI  Mr. Purdy is a 68 year old  male  paroxysmal atrial fibrillation, with hypertension, hyperlipidemia, type 2 diabetes, symptomatic PAD,  hepatocellular carcinoma, post TACE/RFA [ 12/20]  . He had severe aortic stenosis now post TAVAR 2/1/24. He now is much improved with more energy, stamina etc  Lab Results   Component Value Date     02/28/2024     02/28/2024    K 4.9 02/28/2024    K 4.9 02/28/2024     02/28/2024     02/28/2024    CO2 28 02/28/2024    CO2 28 02/28/2024    BUN 25 (H) 02/28/2024    BUN 25 (H) 02/28/2024    CREATININE 1.4 04/15/2024     (H) 02/28/2024     (H) 02/28/2024    HGBA1C 7.5 (H) 01/26/2024    MG 1.9 02/28/2024    AST 20 02/28/2024    AST 20 02/28/2024    ALT 17 02/28/2024    ALT 17 02/28/2024    ALBUMIN 3.6 02/28/2024    ALBUMIN 3.6 02/28/2024    PROT 7.1 02/28/2024    PROT 7.1 02/28/2024    BILITOT 0.7 02/28/2024    BILITOT 0.7 02/28/2024    WBC 6.50 02/28/2024    HGB 11.2 (L) 02/28/2024    HCT 41.5 02/28/2024    HCT 33 (L) 01/06/2022    MCV 76 (L) 02/28/2024     (L) 02/28/2024    INR 1.4 (H) 01/27/2024    PSA 0.25 07/06/2022    TSH 2.223 12/22/2023         Lab Results   Component Value Date    CHOL 130 09/19/2022    HDL 36 (L) 09/19/2022    TRIG 144 09/19/2022       Lab Results   Component Value Date    LDLCALC 65.2 09/19/2022       Past Medical History:   Diagnosis Date    Acute appendicitis 06/02/2023    Aortic stenosis     Atrial fibrillation     johny/cardioversion on 1/26/24    Calculus of ureter 12/22/2023    CHF (congestive heart failure)     Cholangiocarcinoma 03/16/2022    s/p liver transplant    Cirrhosis 11/23/2020    s/p transplant 2022 for CASTILLO cirrhosis and cholangiocarcinoma    Diabetes mellitus, type 2     c/b Diabetic neuropathy    HTN (hypertension) 11/23/2020    Hyperlipidemia 11/23/2020    Kidney  stones 11/23/2020    S/p lithotripsy 2020    PAD (peripheral artery disease)     Skin cancer 11/23/2020       Current Outpatient Medications:     apixaban (ELIQUIS) 5 mg Tab, Take 1 tablet (5 mg total) by mouth 2 (two) times daily., Disp: 180 tablet, Rfl: 3    atorvastatin (LIPITOR) 80 MG tablet, Take 1 tablet (80 mg total) by mouth once daily., Disp: 30 tablet, Rfl: 11    blood sugar diagnostic Strp, To check BG 2 times daily, to use with insurance preferred meter (patient has a [employer] self-monitoring glucose meter), Disp: 100 strip, Rfl: 11    blood-glucose meter kit, To check BG 2 times daily, to use with insurance preferred meter, Disp: 1 each, Rfl: 0    buPROPion (WELLBUTRIN XL) 150 MG TB24 tablet, Take 1 tablet (150 mg total) by mouth every morning., Disp: 90 tablet, Rfl: 1    clopidogreL (PLAVIX) 75 mg tablet, Take 1 tablet (75 mg total) by mouth once daily., Disp: 30 tablet, Rfl: 11    empagliflozin (JARDIANCE) 10 mg tablet, Take 1 tablet (10 mg total) by mouth once daily., Disp: 30 tablet, Rfl: 2    ferrous sulfate (IRON) 325 mg (65 mg iron) Tab tablet, Take 1 tablet (325 mg total) by mouth once daily., Disp: 30 tablet, Rfl: 2    furosemide (LASIX) 20 MG tablet, Take 1 tablet (20 mg total) by mouth once daily., Disp: 45 tablet, Rfl: 11    insulin (LANTUS SOLOSTAR U-100 INSULIN) glargine 100 units/mL SubQ pen, Inject 22 Units into the skin every evening., Disp: 15 mL, Rfl: 11    insulin lispro (HUMALOG KWIKPEN INSULIN) 100 unit/mL pen, Inject 6 Units into the skin 3 (three) times daily with meals. Plus sliding scale, MDD: 48 units, Disp: 15 mL, Rfl: 5    lancets Misc, To check BG 2 times daily, to use with insurance preferred meter, Disp: 100 each, Rfl: 11    magnesium oxide (MAG-OX) 400 mg (241.3 mg magnesium) tablet, Take 1 tablet (400 mg total) by mouth 2 (two) times daily., Disp: 60 tablet, Rfl: 11    metoprolol succinate (TOPROL-XL) 25 MG 24 hr tablet, Take 1 tablet (25 mg total) by mouth once  "daily., Disp: 90 tablet, Rfl: 1    multivitamin capsule, Take 1 capsule by mouth once daily., Disp: , Rfl:     omega-3 fatty acids/fish oil (FISH OIL-OMEGA-3 FATTY ACIDS) 300-1,000 mg capsule, Take 1 capsule by mouth once daily. , Disp: , Rfl:     pantoprazole (PROTONIX) 40 MG tablet, Take 1 tablet (40 mg total) by mouth once daily., Disp: 90 tablet, Rfl: 1    pen needle, diabetic (BD ULTRA-FINE GÉNESIS PEN NEEDLE) 32 gauge x 5/32" Ndle, Use to inject insulin 4 times daily, Disp: 200 each, Rfl: 11    QUEtiapine (SEROQUEL) 100 MG Tab, Take 1 tablet (100 mg total) by mouth every evening., Disp: 90 tablet, Rfl: 1    sacubitriL-valsartan (ENTRESTO) 24-26 mg per tablet, Take 1 tablet by mouth 2 (two) times daily., Disp: 60 tablet, Rfl: 2    tacrolimus (PROGRAF) 0.5 MG Cap, Take 1 capsule (0.5 mg total) by mouth every 12 (twelve) hours., Disp: 60 capsule, Rfl: 11          Review of Systems   Constitutional: Negative for decreased appetite, diaphoresis, fever, malaise/fatigue, weight gain and weight loss.   HENT:  Negative for congestion, ear discharge, ear pain and nosebleeds.    Eyes:  Negative for blurred vision, double vision and visual disturbance.   Cardiovascular:  Positive for dyspnea on exertion (improved). Negative for chest pain, claudication, cyanosis, irregular heartbeat, leg swelling, near-syncope, orthopnea, palpitations, paroxysmal nocturnal dyspnea and syncope.   Respiratory:  Positive for shortness of breath (improved). Negative for cough, hemoptysis, sleep disturbances due to breathing, snoring, sputum production and wheezing.    Endocrine: Negative for polydipsia, polyphagia and polyuria.   Hematologic/Lymphatic: Negative for adenopathy and bleeding problem. Does not bruise/bleed easily.   Skin:  Negative for color change, nail changes, poor wound healing and rash.   Musculoskeletal:  Negative for muscle cramps and muscle weakness.   Gastrointestinal:  Negative for abdominal pain, anorexia, change in bowel " habit, hematochezia, nausea and vomiting.   Genitourinary:  Negative for dysuria, frequency and hematuria.   Neurological:  Negative for brief paralysis, difficulty with concentration, excessive daytime sleepiness, dizziness, focal weakness, headaches, light-headedness, seizures, vertigo and weakness.   Psychiatric/Behavioral:  Negative for altered mental status and depression.    Allergic/Immunologic: Negative for persistent infections.        Objective:BP (!) 140/80   Pulse 93   Ht 6' (1.829 m)   Wt 99.2 kg (218 lb 11.1 oz)   SpO2 (!) 93%   BMI 29.66 kg/m²             Physical Exam  Constitutional:       Appearance: Normal appearance. He is well-developed.   HENT:      Head: Normocephalic.      Right Ear: External ear normal.      Left Ear: External ear normal.      Nose: Nose normal.   Eyes:      General: No scleral icterus.     Pupils: Pupils are equal, round, and reactive to light.   Neck:      Thyroid: No thyromegaly.      Vascular: No JVD.      Trachea: No tracheal deviation.   Cardiovascular:      Rate and Rhythm: Normal rate and regular rhythm.      Pulses: Intact distal pulses.           Carotid pulses are 2+ on the right side and 2+ on the left side.       Dorsalis pedis pulses are 0 on the right side.        Posterior tibial pulses are 0 on the right side and 0 on the left side.      Heart sounds: Murmur heard.      Harsh midsystolic murmur is present with a grade of 2/6 at the upper right sternal border and upper left sternal border radiating to the neck.      No friction rub. No gallop.   Pulmonary:      Effort: Pulmonary effort is normal.      Breath sounds: Normal breath sounds.   Abdominal:      General: Bowel sounds are normal. There is no distension.      Tenderness: There is no abdominal tenderness. There is no guarding.   Musculoskeletal:         General: No tenderness. Normal range of motion.      Cervical back: Normal range of motion and neck supple.   Lymphadenopathy:      Comments:  Palpation of neck and groin lymph nodes normal   Skin:     General: Skin is dry.      Comments: Palpation of skin normal   Neurological:      Mental Status: He is alert and oriented to person, place, and time.      Cranial Nerves: No cranial nerve deficit.      Motor: No abnormal muscle tone.      Coordination: Coordination normal.   Psychiatric:         Behavior: Behavior normal.         Thought Content: Thought content normal.         Judgment: Judgment normal.           Assessment:       1. CHF (congestive heart failure)         Plan:       Tej was seen today for congestive heart failure.    Diagnoses and all orders for this visit:    CHF (congestive heart failure)  -     Ambulatory referral/consult to Cardiology

## 2024-05-08 ENCOUNTER — TELEPHONE (OUTPATIENT)
Dept: CARDIAC REHAB | Facility: CLINIC | Age: 69
End: 2024-05-08
Payer: MEDICARE

## 2024-05-08 NOTE — LETTER
May 8, 2024    Tej Purdy  [address]  [place] [address]             [place] [facility]  [address]  [place] [address]  Phone: [phone]                                  [facility]   [address]   [place] [address]  [phone]         [facility]   [address] [place] [address] [phone]         [facility]    [address] [address]  [place] [address] [phone]   Re: Tej Purdy  Clinic number: [id number]    Dear Mr. Purdy:    You were recently admitted to an [facility] facility for cardiac (heart) problem.  Your physician has referred you to [facility].  Cardiac Rehab Phase 2 is an educational and exercise program, conducted in a outpatient setting, proven to help reduce your risk for recurrent heart events.    Cardiac rehab has two major parts:    1. Exercise training to help you achieve cardiovascular fitness while learning how to exercise safely and improve muscle strength and endurance.  Your exercise prescription will be based on the results of the cardiopulmonary stress test (CPX) which will be done before entering the program and at completion.  2. Education, counseling and training to help you understand your heart condition and find ways to reduce your risk of future heart problems.  The cardiac rehab team will help you learn how to cope with the stress of adjusting to a new lifestyle and to deal with your fears about the future.    Phase 2 is a 36-session program, meeting 3 times a week for 12 weeks.  Each session consists of an hour of exercise and half-hour dedicated to the educational topic of the day.  Class days vary per location.  Please contact your nearest facility for details.    Through cardiac rehab you will learn:  About your heart condition, medical therapies, and medication  Risk factors in y our lifestyle contributing to heart  disease  New strategies to modify your risk factors  About a healthy diet that can lower your blood cholesterol, control weight, help prevent or control high blood pressure, and diabetes  How to stop smoking  How to manage stress    If you are interested in getting started, call the [facility] of your choosing.     Sincerely,     [facility]

## 2024-05-08 NOTE — TELEPHONE ENCOUNTER
Letter regarding Phase II cardiac rehab was sent to patient.  Will contact patient in 2 weeks to see if interested.  Also, information letter sent to [facility].  Eduardo Montanez, RN  Cardiac Rehab Nurse

## 2024-05-13 DIAGNOSIS — S91.309A NON-HEALING OPEN WOUND OF HEEL, INITIAL ENCOUNTER: Primary | ICD-10-CM

## 2024-05-17 ENCOUNTER — TELEPHONE (OUTPATIENT)
Dept: ADMINISTRATIVE | Facility: HOSPITAL | Age: 69
End: 2024-05-17
Payer: MEDICARE

## 2024-05-20 ENCOUNTER — PATIENT MESSAGE (OUTPATIENT)
Dept: CARDIOLOGY | Facility: CLINIC | Age: 69
End: 2024-05-20
Payer: MEDICARE

## 2024-05-20 ENCOUNTER — OFFICE VISIT (OUTPATIENT)
Dept: PODIATRY | Facility: CLINIC | Age: 69
End: 2024-05-20
Payer: MEDICARE

## 2024-05-20 VITALS — BODY MASS INDEX: 29.62 KG/M2 | HEIGHT: 72 IN | WEIGHT: 218.69 LBS

## 2024-05-20 DIAGNOSIS — E11.51 TYPE II DIABETES MELLITUS WITH PERIPHERAL CIRCULATORY DISORDER: Primary | ICD-10-CM

## 2024-05-20 DIAGNOSIS — E11.49 TYPE II DIABETES MELLITUS WITH NEUROLOGICAL MANIFESTATIONS: ICD-10-CM

## 2024-05-20 DIAGNOSIS — L60.0 INGROWN NAIL: ICD-10-CM

## 2024-05-20 DIAGNOSIS — S91.309A NON-HEALING OPEN WOUND OF HEEL, INITIAL ENCOUNTER: ICD-10-CM

## 2024-05-20 PROCEDURE — 87070 CULTURE OTHR SPECIMN AEROBIC: CPT | Performed by: PODIATRIST

## 2024-05-20 PROCEDURE — 3051F HG A1C>EQUAL 7.0%<8.0%: CPT | Mod: CPTII,S$GLB,, | Performed by: PODIATRIST

## 2024-05-20 PROCEDURE — 1125F AMNT PAIN NOTED PAIN PRSNT: CPT | Mod: CPTII,S$GLB,, | Performed by: PODIATRIST

## 2024-05-20 PROCEDURE — 1159F MED LIST DOCD IN RCRD: CPT | Mod: CPTII,S$GLB,, | Performed by: PODIATRIST

## 2024-05-20 PROCEDURE — 3008F BODY MASS INDEX DOCD: CPT | Mod: CPTII,S$GLB,, | Performed by: PODIATRIST

## 2024-05-20 PROCEDURE — 99999 PR PBB SHADOW E&M-EST. PATIENT-LVL V: CPT | Mod: PBBFAC,,, | Performed by: PODIATRIST

## 2024-05-20 PROCEDURE — 99204 OFFICE O/P NEW MOD 45 MIN: CPT | Mod: S$GLB,,, | Performed by: PODIATRIST

## 2024-05-20 PROCEDURE — 1160F RVW MEDS BY RX/DR IN RCRD: CPT | Mod: CPTII,S$GLB,, | Performed by: PODIATRIST

## 2024-05-20 PROCEDURE — 4010F ACE/ARB THERAPY RXD/TAKEN: CPT | Mod: CPTII,S$GLB,, | Performed by: PODIATRIST

## 2024-05-20 PROCEDURE — 87075 CULTR BACTERIA EXCEPT BLOOD: CPT | Performed by: PODIATRIST

## 2024-05-20 RX ORDER — DOXYCYCLINE HYCLATE 100 MG
100 TABLET ORAL 2 TIMES DAILY
Qty: 20 TABLET | Refills: 0 | Status: SHIPPED | OUTPATIENT
Start: 2024-05-20

## 2024-05-20 NOTE — PROGRESS NOTES
Subjective:      Patient ID: Tej Purdy is a 68 y.o. male.    Chief Complaint: Wound Care (Left foot heel/blister)    Tej Purdy is a 68 y.o. male with  has a past medical history of Acute appendicitis, Aortic stenosis, Atrial fibrillation, Calculus of ureter, CHF (congestive heart failure), Cholangiocarcinoma, Cirrhosis, Diabetes mellitus, type 2, HTN (hypertension), Hyperlipidemia, Kidney stones, PAD (peripheral artery disease), and Skin cancer. presents to the podiatry clinic for care of  left foot ulcer.   Location: posterior heel. Onset of the symptoms was several days ago. Precipitating event:  likely a new shoe .   Current symptoms include: redness, drainage, color change. Signs of systemic infection: patient denies nausea, vomiting, fever, chills  Symptoms have progressed to a point and plateaued. Patient has had no prior foot problems. Evaluation to date: none.  Patients rates pain 4/10 on pain scale.    Wears toe ankle socks    Shoe gear: Leather boat shoes      Chief Complaint   Patient presents with    Wound Care     Left foot heel/blister       Hemoglobin A1C   Date Value Ref Range Status   01/26/2024 7.5 (H) 4.0 - 5.6 % Final     Comment:     ADA Screening Guidelines:  5.7-6.4%  Consistent with prediabetes  >or=6.5%  Consistent with diabetes    High levels of fetal hemoglobin interfere with the HbA1C  assay. Heterozygous hemoglobin variants (HbS, HgC, etc)do  not significantly interfere with this assay.   However, presence of multiple variants may affect accuracy.     12/23/2023 6.5 (H) 4.0 - 5.6 % Final     Comment:     ADA Screening Guidelines:  5.7-6.4%  Consistent with prediabetes  >or=6.5%  Consistent with diabetes    High levels of fetal hemoglobin interfere with the HbA1C  assay. Heterozygous hemoglobin variants (HbS, HgC, etc)do  not significantly interfere with this assay.   However, presence of multiple variants may affect accuracy.     06/01/2023 6.2 (H) 4.0 - 5.6 % Final      Comment:     ADA Screening Guidelines:  5.7-6.4%  Consistent with prediabetes  >or=6.5%  Consistent with diabetes    High levels of fetal hemoglobin interfere with the HbA1C  assay. Heterozygous hemoglobin variants (HbS, HgC, etc)do  not significantly interfere with this assay.   However, presence of multiple variants may affect accuracy.                 Patient Active Problem List   Diagnosis    Hyperlipidemia    Primary hypertension    Skin cancer    Kidney stone    Diabetic neuropathy    PAD (peripheral artery disease)    Abdominal pain    Type 2 diabetes mellitus, with long-term current use of insulin    Coronary artery disease involving native coronary artery of native heart without angina pectoris    Persistent atrial fibrillation    History of cardioversion    Current use of anticoagulant therapy    S/P liver transplant    At risk for opportunistic infections    Prophylactic immunotherapy    Anemia of chronic disease    Long-term use of immunosuppressant medication    Weakness    Type 2 diabetes mellitus with hyperglycemia    Anemia due to unknown mechanism    Fatigue    Other ascites    Cholangiocarcinoma    Aortic stenosis    Unstable angina    Presence of drug-eluting stent in left circumflex coronary artery    Orthostatic dizziness    Nephrolithiasis    Acute on chronic heart failure with reduced ejection fraction (HFrEF)    History of liver transplant    Iron deficiency anemia    Immunodeficiency due to drugs    S/P TAVR (transcatheter aortic valve replacement)    CHF (congestive heart failure)       Current Outpatient Medications on File Prior to Visit   Medication Sig Dispense Refill    apixaban (ELIQUIS) 5 mg Tab Take 1 tablet (5 mg total) by mouth 2 (two) times daily. 180 tablet 3    atorvastatin (LIPITOR) 80 MG tablet Take 1 tablet (80 mg total) by mouth once daily. 90 tablet 3    blood sugar diagnostic Strp To check BG 2 times daily, to use with insurance preferred meter (patient has a [employer]  "self-monitoring glucose meter) 100 strip 11    blood-glucose meter kit To check BG 2 times daily, to use with insurance preferred meter 1 each 0    buPROPion (WELLBUTRIN XL) 150 MG TB24 tablet Take 1 tablet (150 mg total) by mouth every morning. 90 tablet 1    carvediloL (COREG) 12.5 MG tablet Take 1 tablet (12.5 mg total) by mouth 2 (two) times daily with meals. 180 tablet 3    clopidogreL (PLAVIX) 75 mg tablet Take 1 tablet (75 mg total) by mouth once daily. 90 tablet 3    empagliflozin (JARDIANCE) 10 mg tablet Take 1 tablet (10 mg total) by mouth once daily. 90 tablet 3    ferrous sulfate (IRON) 325 mg (65 mg iron) Tab tablet Take 1 tablet (325 mg total) by mouth once daily. 30 tablet 2    furosemide (LASIX) 20 MG tablet Take 1 tablet (20 mg total) by mouth once daily. 90 tablet 3    insulin (LANTUS SOLOSTAR U-100 INSULIN) glargine 100 units/mL SubQ pen Inject 22 Units into the skin every evening. 15 mL 11    insulin lispro (HUMALOG KWIKPEN INSULIN) 100 unit/mL pen Inject 6 Units into the skin 3 (three) times daily with meals. Plus sliding scale, MDD: 48 units 15 mL 5    lancets Misc To check BG 2 times daily, to use with insurance preferred meter 100 each 11    magnesium oxide (MAG-OX) 400 mg (241.3 mg magnesium) tablet Take 1 tablet (400 mg total) by mouth 2 (two) times daily. 60 tablet 11    multivitamin capsule Take 1 capsule by mouth once daily.      omega-3 fatty acids/fish oil (FISH OIL-OMEGA-3 FATTY ACIDS) 300-1,000 mg capsule Take 1 capsule by mouth once daily.       pantoprazole (PROTONIX) 40 MG tablet Take 1 tablet (40 mg total) by mouth once daily. 90 tablet 1    pen needle, diabetic (BD ULTRA-FINE GÉNESIS PEN NEEDLE) 32 gauge x 5/32" Ndle Use to inject insulin 4 times daily 200 each 11    QUEtiapine (SEROQUEL) 100 MG Tab Take 1 tablet (100 mg total) by mouth every evening. 90 tablet 1    sacubitriL-valsartan (ENTRESTO) 24-26 mg per tablet Take 1 tablet by mouth 2 (two) times daily. 60 tablet 2    " sacubitriL-valsartan (ENTRESTO) 49-51 mg per tablet Take 1 tablet by mouth 2 (two) times daily. 180 tablet 3    tacrolimus (PROGRAF) 0.5 MG Cap Take 1 capsule (0.5 mg total) by mouth every 12 (twelve) hours. 60 capsule 11     No current facility-administered medications on file prior to visit.       Review of patient's allergies indicates:   Allergen Reactions    Bee pollens Swelling     BEE STINGS swells body       Past Surgical History:   Procedure Laterality Date    ANGIOGRAM, CORONARY, WITH LEFT HEART CATHETERIZATION N/A 12/26/2023    Procedure: Angiogram, Coronary, with Left Heart Cath;  Surgeon: Carlos King MD;  Location: [facility] CATH LAB;  Service: Cardiology;  Laterality: N/A;    AORTIC VALVULOPLASTY N/A 2/1/2024    Procedure: REPAIR, AORTIC VALVE;  Surgeon: Shilo Carrasco MD;  Location: [facility] CATH LAB;  Service: Cardiology;  Laterality: N/A;    COLONOSCOPY  10/2020    ECHOCARDIOGRAM,TRANSESOPHAGEAL N/A 1/26/2024    Procedure: Transesophageal echo (ASHISH) intra-procedure log documentation;  Surgeon: Nick Mathur III, MD;  Location: [facility] EP LAB;  Service: Cardiology;  Laterality: N/A;    ESOPHAGEAL MANOMETRY WITH MEASUREMENT OF IMPEDANCE N/A 7/17/2023    Procedure: MANOMETRY, ESOPHAGUS, WITH IMPEDANCE MEASUREMENT;  Surgeon: Klarissa Zamora MD;  Location: [facility] ENDO (4TH FLR);  Service: Gastroenterology;  Laterality: N/A;  pt diabetic  liver txp  prep insructions sent to pt via email and portal -    ESOPHAGOGASTRODUODENOSCOPY  10/2020    ESOPHAGOGASTRODUODENOSCOPY N/A 7/1/2021    Procedure: EGD (ESOPHAGOGASTRODUODENOSCOPY);  Surgeon: Jovan David MD;  Location: [facility] ENDO (4TH FLR);  Service: Endoscopy;  Laterality: N/A;  HCC. Listed for liver transplant. EGD for variceal surveillance.  cardiac clearance and blood thinenr approval received, see telephone encounter 6/23/21-BB  fully vaccinated-BB  labs same day of procedure-BB    EXCISION OF HYDROCELE Right     hemorroid surgery       hemorroidectomy      KIDNEY STONE SURGERY      LAPAROSCOPIC APPENDECTOMY N/A 6/2/2023    Procedure: APPENDECTOMY, LAPAROSCOPIC;  Surgeon: Shan Ross MD;  Location: [facility] OR [facility] FLR;  Service: General;  Laterality: N/A;    LEFT HEART CATHETERIZATION N/A 1/27/2021    Procedure: Left heart cath;  Surgeon: Kris Shelton MD;  Location: [facility] CATH LAB;  Service: Cardiology;  Laterality: N/A;    liver mass removal      LIVER TRANSPLANT N/A 1/6/2022    Procedure: TRANSPLANT, LIVER;  Surgeon: Lizet Garcia MD;  Location: [facility] OR [facility] FLR;  Service: Transplant;  Laterality: N/A;    RIGHT HEART CATHETERIZATION Right 5/7/2021    Procedure: INSERTION, CATHETER, RIGHT HEART;  Surgeon: Jaden Schuster MD;  Location: [facility] CATH LAB;  Service: Cardiology;  Laterality: Right;    STENT, DRUG ELUTING, SINGLE VESSEL, CORONARY  12/26/2023    Procedure: Stent, Drug Eluting, Single Vessel, Coronary;  Surgeon: Carlos King MD;  Location: [facility] CATH LAB;  Service: Cardiology;;    TREATMENT OF CARDIAC ARRHYTHMIA N/A 5/19/2021    Procedure: CARDIOVERSION;  Surgeon: Didier Tilley MD;  Location: [facility] EP LAB;  Service: Cardiology;  Laterality: N/A;  a fib, dccv/johny, mac, dm. sscu    TREATMENT OF CARDIAC ARRHYTHMIA N/A 5/31/2021    Procedure: CARDIOVERSION;  Surgeon: Daniel Arambula MD;  Location: [facility] EP LAB;  Service: Cardiology;  Laterality: N/A;  afib, DCCV, anest., DM, 3 prep    TREATMENT OF CARDIAC ARRHYTHMIA N/A 7/7/2021    Procedure: Cardioversion or Defibrillation;  Surgeon: Didier Tilley MD;  Location: [facility] EP LAB;  Service: Cardiology;  Laterality: N/A;  AF, JOHNY (cancel if complaint), DCCV, MAC, DM, 3 Prep    TREATMENT OF CARDIAC ARRHYTHMIA N/A 7/27/2021    Procedure: Cardioversion or Defibrillation;  Surgeon: Didier Tilley MD;  Location: [facility] EP LAB;  Service: Cardiology;  Laterality: N/A;  AF, JOHNY (cx if complaint), DCCV, MAC, DM, 3 Prep    TREATMENT OF CARDIAC ARRHYTHMIA N/A 1/26/2024    Procedure:  Cardioversion or Defibrillation;  Surgeon: Koko Knight MD;  Location: [facility] EP LAB;  Service: Cardiology;  Laterality: N/A;  AF, ASHISH, DCCV, MAC, DM, 3 Prep *ADENOSINE TEST* *LIVER TRANSPLANT PATIENT*       Family History   Problem Relation Name Age of Onset    Coronary artery disease Father      Colon cancer Neg Hx      Esophageal cancer Neg Hx         Social History     Socioeconomic History    Marital status:    Tobacco Use    Smoking status: Former     Current packs/day: 0.00     Types: Cigarettes     Quit date: 1980     Years since quittin.3    Smokeless tobacco: Former   Substance and Sexual Activity    Alcohol use: Not Currently    Drug use: Never     Social Determinants of Health     Financial Resource Strain: Low Risk  (2024)    Overall Financial Resource Strain (CARDIA)     Difficulty of Paying Living Expenses: Not very hard   Food Insecurity: No Food Insecurity (2024)    Hunger Vital Sign     Worried About Running Out of Food in the Last Year: Never true     Ran Out of Food in the Last Year: Never true   Transportation Needs: No Transportation Needs (2024)    PRAPARE - Transportation     Lack of Transportation (Medical): No     Lack of Transportation (Non-Medical): No   Physical Activity: Inactive (2024)    Exercise Vital Sign     Days of Exercise per Week: 0 days     Minutes of Exercise per Session: 0 min   Stress: No Stress Concern Present (2024)    [employer] - Occupational Stress Questionnaire     Feeling of Stress : Only a little   Recent Concern: Stress - Stress Concern Present (2023)    [employer] - Occupational Stress Questionnaire     Feeling of Stress : Rather much   Housing Stability: Low Risk  (2024)    Housing Stability Vital Sign     Unable to Pay for Housing in the Last Year: No     Number of Places Lived in the Last Year: 1     Unstable Housing in the Last Year: No        Review of Systems   Constitutional: Negative for chills and fever.   Cardiovascular:  Negative for claudication and leg swelling.   Respiratory:  Negative for cough and shortness of breath.    Skin:  Positive for dry skin and poor wound healing. Negative for itching and rash.   Musculoskeletal:  Positive for joint pain, myalgias and stiffness. Negative for falls, joint swelling and muscle weakness.   Gastrointestinal:  Negative for diarrhea, nausea and vomiting.   Neurological:  Positive for numbness and paresthesias. Negative for tremors and weakness.   Psychiatric/Behavioral:  Negative for altered mental status and hallucinations.            Objective:      Vitals:    05/20/24 0912   Weight: 99.2 kg (218 lb 11.1 oz)   Height: 6' (1.829 m)   PainSc:   4   PainLoc: Foot       Physical Exam  Vitals and nursing note reviewed.   Constitutional:       General: He is not in acute distress.     Appearance: He is well-developed. He is not toxic-appearing or diaphoretic.      Comments: alert and oriented x 3.    Cardiovascular:      Comments: Dorsalis pedis and posterior tibial pulses are diminished Bilaterally. Toes are cool to touch. Feet are warm proximally.There is decreased digital hair. Skin is atrophic  Pulmonary:      Effort: No respiratory distress.   Musculoskeletal:         General: No deformity.      Right ankle: No tenderness. No lateral malleolus, medial malleolus, AITF ligament, CF ligament or posterior TF ligament tenderness.      Right Achilles Tendon: No defects. Sanchez's test negative.      Left ankle: No tenderness. No lateral malleolus, medial malleolus, AITF ligament, CF ligament or posterior TF ligament tenderness.      Left Achilles Tendon: No defects. Sanchez's test negative.      Right foot: No tenderness or bony tenderness.      Left foot: No tenderness or bony tenderness.      Comments: Adequate joint range of motion without pain, limitation, nor crepitation Bilateral feet and ankle  joints. Muscle strength is 5/5 in all groups bilaterally.           Feet:      Right foot:      Skin integrity: Skin integrity normal.      Left foot:      Skin integrity: Ulcer (see below) and erythema present.      Toenail Condition: Left toenails are ingrown.   Lymphadenopathy:      Comments: No lymphatic streaking     Skin:     General: Skin is warm and dry.      Coloration: Skin is not pale.      Findings: No rash.      Nails: There is no clubbing.   Neurological:      Sensory: Sensory deficit present.      Motor: No atrophy.      Comments: [place]-Bud 5.07 monofilamant testing is diminished Stanley feet.      Psychiatric:         Attention and Perception: He is attentive.         Mood and Affect: Mood is not anxious. Affect is not inappropriate.         Speech: He is communicative. Speech is not slurred.         Behavior: Behavior is not combative.       5/20/2024    Ulcer location: posterior left heel  Measurements : 0.4x0.6x0.2  cm   Signs of infection: local edema and erythema  Drainage: Sero-Sanguinous  Purulence: No  Crepitus/fluctuance: No  Periwound: Reddened, Macerated, Calloused  Base: Fibrotic slough  Depth: subcutaneous tissue  Probe to bone: No        Edema and erythema to lateral nail border            Assessment:       Encounter Diagnoses   Name Primary?    Non-healing open wound of heel, initial encounter     Type II diabetes mellitus with peripheral circulatory disorder Yes    Type II diabetes mellitus with neurological manifestations     Ingrown nail          Plan:     Problem List Items Addressed This Visit    None  Visit Diagnoses       Type II diabetes mellitus with peripheral circulatory disorder    -  Primary    Non-healing open wound of heel, initial encounter        Relevant Orders    Aerobic culture (Completed)    Culture, Anaerobic (Completed)    X-Ray Foot Complete Left    POST-SURGICAL BOOT/SHOE FOR HOME USE    Type II diabetes mellitus with neurological manifestations         Ingrown nail               I counseled the patient on his conditions, their implications and medical management.    Education about the prevention of limb loss.    Discussed wound healing cycle, skin integrity, ways to care for skin.Counseled patient on the effects of PAD, biomechanical pressure, and high blood glucose on healing. He verbalizes understanding that it can increase the chances of delayed healing and this prolonged exposure leads to infection or progression of infection which subsequently can result in loss of limb.    Adequate vitamin supplementation, protein intake, and hydration - discussed with patient    Imaging ordered to rule out bone involvement or gas in the soft tissues.     The wound is cleansed of foreign material as much as possible and the base inspected for bone or abscess.  Base is fibrotic and without bone nor joint exposure. Aerobic and anerobic cultures swabs taken    Dressings: iodosorb  Offloading: football and CAM boot to immobilize achilles    Follow-up: 1 week but should call [facility] immediately if any signs of infection, such as fever, chills, sweats, increased redness or pain.    Short-term goals include maintaining good offloading and minimizing bioburden, promoting granulation and epithelialization to healing.  Long-term goals include keeping the wound healed by good offloading and medical management under the direction of internist.    Education about the diabetic foot, neuropathy, and prevention of limb loss.    Shoe inspection. Diabetic Foot Education. Patient reminded of the importance of good nutrition/healthy diet/weight management and blood sugar control to help prevent podiatric complications of diabetes. Patient instructed on proper foot hygeine. Wear comfortable, proper fitting shoes. Wash feet daily. Dry well. After drying, apply moisturizer to feet (no lotion to webspaces). Inspect feet daily for skin breaks, blisters, swelling, or redness. Wear cotton socks  (preferably white)  Change socks every day. Do NOT walk barefoot. Do NOT use heating pads or hot water soaks. We discussed wearing proper shoe gear, daily foot inspections, never walking without protective shoe gear.     Discussed edema control and the importance of daily moisturizer to the skin of the lower extremity          Procedures

## 2024-05-20 NOTE — PATIENT INSTRUCTIONS
Please keep football dressing clean, dry, and intact.  If dressing gets wet please contact our office.    Wear special shoe every time foot is placed on the floor.    Elevate affected foot as much as possible    Stay hydrated.      Nutrition and MyPlate: Protein Foods  This group includes foods that are high in protein. Protein helps the body build new cells and keeps tissues healthy. Most Americans get enough protein without even trying. It can be harder for vegetarians, but plenty of non-meat foods are rich in protein, too. Its best to get protein from a variety of sources.    Nutrient-Rich Choices  Theres a lot more to this food group than just meat and beans. It also includes nuts, seeds, and eggs. There are all sorts of nutrient-rich choices:  Chicken and turkey with the skin removed  Fish and shellfish  Lean beef, pork, or lamb (without visible fat)  Soy products, such as tofu, soybeans (edamame), tempeh, or soymilk  Black beans, kidney beans, berrios beans, chickpeas (garbanzo beans), and lentils (Note: beans and peas count as both a protein and a vegetable)  Peanuts, almonds, walnuts, sesame seeds, and sunflower  seeds, as well as foods made from these (such as peanut butter or tahini)  Eggs and foods made with eggs (such as quiche or frittata)  What Makes Meat and Beans Less Healthy?  Fatty meat is not healthy. Before you cook meat, trim off all the fat you can see. Chicken and turkey skin is also high in fat, and should be removed before cooking.  Breading and frying make food less healthy. This includes dishes like fried chicken, fried fish, and refried beans.  Sausage and lunch meats tend to be high in fat and salt. Buy low-fat, low-sodium versions.  One Small Change  Make a meal that includes a non-meat source of protein (such as tofu, lentils, or any other food listed above). Have a better idea? Write it here:  _____________________________________________________________  © 8163-1248 [employer] [facility]  Email Data Source, [employer]. [address] [place] [address], [place] [address]. All rights reserved. This information is not intended as a substitute for professional medical care. Always follow your healthcare professional's instructions.

## 2024-05-21 ENCOUNTER — TELEPHONE (OUTPATIENT)
Dept: CARDIOLOGY | Facility: CLINIC | Age: 69
End: 2024-05-21
Payer: MEDICARE

## 2024-05-21 NOTE — TELEPHONE ENCOUNTER
Spoke w. pt and wife who prepares his meds. She had multiple questions about meds (what it was for etc..). Teaching done, questions answered. Pt is still taking Entresto 24-26 and metoprolol. Both were updated on stopping metoprolol and Entresto 2426 and starting carvedilol and Entresto 49-51 tonight (just got refilled/ received). I moved the upcoming labwork for liver transplant from MOn 5/27 to Tue 5/28 to make it a week after med change as pt has no upcoming appt in department. I also messaged dr Henao team about the change in lab date.    Both verbalized understanding.

## 2024-05-21 NOTE — TELEPHONE ENCOUNTER
Patient is starting new meds tonight (carvedilol, increased Entresto dose) that could affect K, renal function and prograf levels. I moved his upcoming labs from Mon 27 to Tue 28 to make it one week after med changes.   Please contact me if this is an issue.

## 2024-05-23 ENCOUNTER — TELEPHONE (OUTPATIENT)
Dept: CARDIAC REHAB | Facility: CLINIC | Age: 69
End: 2024-05-23
Payer: MEDICARE

## 2024-05-23 LAB — BACTERIA SPEC AEROBE CULT: NO GROWTH

## 2024-05-24 ENCOUNTER — HOSPITAL ENCOUNTER (OUTPATIENT)
Dept: RADIOLOGY | Facility: HOSPITAL | Age: 69
Discharge: HOME | End: 2024-05-24
Attending: PODIATRIST
Payer: MEDICARE

## 2024-05-24 ENCOUNTER — TELEPHONE (OUTPATIENT)
Dept: CARDIAC REHAB | Facility: CLINIC | Age: 69
End: 2024-05-24
Payer: MEDICARE

## 2024-05-24 DIAGNOSIS — S91.309A NON-HEALING OPEN WOUND OF HEEL, INITIAL ENCOUNTER: ICD-10-CM

## 2024-05-24 LAB — BACTERIA SPEC ANAEROBE CULT: NORMAL

## 2024-05-24 PROCEDURE — 73630 X-RAY EXAM OF FOOT: CPT | Mod: TC,PO,LT

## 2024-05-24 PROCEDURE — 73630 X-RAY EXAM OF FOOT: CPT | Mod: 26,LT,, | Performed by: RADIOLOGY

## 2024-05-24 NOTE — TELEPHONE ENCOUNTER
Insurance information back from authorization.  Attempted to contact patient.  Left message.  Alma Cerna RN  Cardiac Rehab Nurse

## 2024-05-27 ENCOUNTER — TELEPHONE (OUTPATIENT)
Dept: PODIATRY | Facility: CLINIC | Age: 69
End: 2024-05-27
Payer: MEDICARE

## 2024-05-27 ENCOUNTER — PATIENT MESSAGE (OUTPATIENT)
Dept: PODIATRY | Facility: CLINIC | Age: 69
End: 2024-05-27

## 2024-05-27 NOTE — TELEPHONE ENCOUNTER
Called patient to confirm appointment, patient verbalized understanding of time and location of the appointment.

## 2024-05-27 NOTE — TELEPHONE ENCOUNTER
----- Message from Darline Enriquez DPM sent at 5/27/2024 12:05 PM CDT -----  Regarding: FW: appt access  Contact: pt [phone]  Patient missed his Damien Bañuelos appt with me today, he has a wound.  Please assist with getting him seen locally this week  ----- Message -----  From: Katelin Delatorre MA  Sent: 5/27/2024   9:25 AM CDT  To: Darline Enriquez DPM; Susie Dejesus MA  Subject: FW: appt access                                    ----- Message -----  From: [place]  Sent: 5/27/2024   9:23 AM CDT  To: Zoe Gregorio Staff  Subject: appt access                                      Aniya /spouse calling to see if the patient can still be seen today. Patient has an infection. Pt had an appt at 8:45, patient was late due to parking. Patient's spouse Aniya advised called ahead of time to advise she will be running late. Patient is willing to be seen tomorrow or Wednesday.Pls call

## 2024-05-28 ENCOUNTER — OFFICE VISIT (OUTPATIENT)
Dept: PODIATRY | Facility: CLINIC | Age: 69
End: 2024-05-28
Payer: MEDICARE

## 2024-05-28 VITALS
BODY MASS INDEX: 29.56 KG/M2 | DIASTOLIC BLOOD PRESSURE: 87 MMHG | WEIGHT: 218.25 LBS | HEART RATE: 76 BPM | HEIGHT: 72 IN | SYSTOLIC BLOOD PRESSURE: 145 MMHG | RESPIRATION RATE: 18 BRPM

## 2024-05-28 DIAGNOSIS — E11.51 TYPE II DIABETES MELLITUS WITH PERIPHERAL CIRCULATORY DISORDER: Primary | ICD-10-CM

## 2024-05-28 PROCEDURE — 99999 PR PBB SHADOW E&M-EST. PATIENT-LVL IV: CPT | Mod: PBBFAC,,, | Performed by: PODIATRIST

## 2024-05-28 PROCEDURE — 1126F AMNT PAIN NOTED NONE PRSNT: CPT | Mod: CPTII,S$GLB,, | Performed by: PODIATRIST

## 2024-05-28 PROCEDURE — 3077F SYST BP >= 140 MM HG: CPT | Mod: CPTII,S$GLB,, | Performed by: PODIATRIST

## 2024-05-28 PROCEDURE — 3079F DIAST BP 80-89 MM HG: CPT | Mod: CPTII,S$GLB,, | Performed by: PODIATRIST

## 2024-05-28 PROCEDURE — 3008F BODY MASS INDEX DOCD: CPT | Mod: CPTII,S$GLB,, | Performed by: PODIATRIST

## 2024-05-28 PROCEDURE — 99213 OFFICE O/P EST LOW 20 MIN: CPT | Mod: S$GLB,,, | Performed by: PODIATRIST

## 2024-05-28 PROCEDURE — 4010F ACE/ARB THERAPY RXD/TAKEN: CPT | Mod: CPTII,S$GLB,, | Performed by: PODIATRIST

## 2024-05-28 PROCEDURE — 3051F HG A1C>EQUAL 7.0%<8.0%: CPT | Mod: CPTII,S$GLB,, | Performed by: PODIATRIST

## 2024-05-29 ENCOUNTER — PATIENT MESSAGE (OUTPATIENT)
Dept: TRANSPLANT | Facility: CLINIC | Age: 69
End: 2024-05-29
Payer: MEDICARE

## 2024-05-29 NOTE — TELEPHONE ENCOUNTER
Pt started Entresto 49-51 about 8 days ago and I had rescheduled his labs, w. [facility] labs fro 5/27 to 5/28. [facility] apparently moved his labs to 6/4. Is it ok to wait 2 weeks post Entresto new dose for labs.      Please advise,

## 2024-05-31 ENCOUNTER — OFFICE VISIT (OUTPATIENT)
Dept: DERMATOLOGY | Facility: CLINIC | Age: 69
End: 2024-05-31
Payer: MEDICARE

## 2024-05-31 DIAGNOSIS — Z12.83 SKIN EXAM, SCREENING FOR CANCER: Primary | ICD-10-CM

## 2024-05-31 DIAGNOSIS — L82.1 SEBORRHEIC KERATOSES: ICD-10-CM

## 2024-05-31 DIAGNOSIS — L81.4 LENTIGINES: ICD-10-CM

## 2024-05-31 DIAGNOSIS — D18.01 CHERRY ANGIOMA: ICD-10-CM

## 2024-05-31 DIAGNOSIS — C44.90 SKIN CANCER: ICD-10-CM

## 2024-05-31 DIAGNOSIS — D22.9 NEVUS: ICD-10-CM

## 2024-05-31 PROCEDURE — 1160F RVW MEDS BY RX/DR IN RCRD: CPT | Mod: CPTII,S$GLB,, | Performed by: DERMATOLOGY

## 2024-05-31 PROCEDURE — 1126F AMNT PAIN NOTED NONE PRSNT: CPT | Mod: CPTII,S$GLB,, | Performed by: DERMATOLOGY

## 2024-05-31 PROCEDURE — 4010F ACE/ARB THERAPY RXD/TAKEN: CPT | Mod: CPTII,S$GLB,, | Performed by: DERMATOLOGY

## 2024-05-31 PROCEDURE — 99999 PR PBB SHADOW E&M-EST. PATIENT-LVL II: CPT | Mod: PBBFAC,,, | Performed by: DERMATOLOGY

## 2024-05-31 PROCEDURE — 1101F PT FALLS ASSESS-DOCD LE1/YR: CPT | Mod: CPTII,S$GLB,, | Performed by: DERMATOLOGY

## 2024-05-31 PROCEDURE — 3051F HG A1C>EQUAL 7.0%<8.0%: CPT | Mod: CPTII,S$GLB,, | Performed by: DERMATOLOGY

## 2024-05-31 PROCEDURE — 3288F FALL RISK ASSESSMENT DOCD: CPT | Mod: CPTII,S$GLB,, | Performed by: DERMATOLOGY

## 2024-05-31 PROCEDURE — 99213 OFFICE O/P EST LOW 20 MIN: CPT | Mod: S$GLB,,, | Performed by: DERMATOLOGY

## 2024-05-31 PROCEDURE — 1159F MED LIST DOCD IN RCRD: CPT | Mod: CPTII,S$GLB,, | Performed by: DERMATOLOGY

## 2024-05-31 NOTE — PROGRESS NOTES
Subjective:      Patient ID:  Tej Purdy is a 68 y.o. male who presents for   Chief Complaint   Patient presents with    Skin Check     Would like skin check no lesions of concern.         Review of Systems    Objective:   Physical Exam   Constitutional: He appears well-developed and well-nourished. No distress.   Neurological: He is alert and oriented to person, place, and time. He is not disoriented.   Psychiatric: He has a normal mood and affect.   Skin:   Areas Examined (abnormalities noted in diagram):   Head / Face Inspection Performed  Neck Inspection Performed  Chest / Axilla Inspection Performed  Abdomen Inspection Performed  Back Inspection Performed  RUE Inspected  LUE Inspection Performed  RLE Inspected  LLE Inspection Performed                Diagram Legend     Erythematous scaling macule/papule c/w actinic keratosis       Vascular papule c/w angioma      Pigmented verrucoid papule/plaque c/w seborrheic keratosis      Yellow umbilicated papule c/w sebaceous hyperplasia      Irregularly shaped tan macule c/w lentigo     1-2 mm smooth white papules consistent with Milia      Movable subcutaneous cyst with punctum c/w epidermal inclusion cyst      Subcutaneous movable cyst c/w pilar cyst      Firm pink to brown papule c/w dermatofibroma      Pedunculated fleshy papule(s) c/w skin tag(s)      Evenly pigmented macule c/w junctional nevus     Mildly variegated pigmented, slightly irregular-bordered macule c/w mildly atypical nevus      Flesh colored to evenly pigmented papule c/w intradermal nevus       Pink pearly papule/plaque c/w basal cell carcinoma      Erythematous hyperkeratotic cursted plaque c/w SCC      Surgical scar with no sign of skin cancer recurrence      Open and closed comedones      Inflammatory papules and pustules      Verrucoid papule consistent consistent with wart     Erythematous eczematous patches and plaques     Dystrophic onycholytic nail with subungual debris c/w  "onychomycosis     Umbilicated papule    Erythematous-base heme-crusted tan verrucoid plaque consistent with inflamed seborrheic keratosis     Erythematous Silvery Scaling Plaque c/w Psoriasis     See annotation      Assessment / Plan:        Skin exam, screening for cancer  Area of previous NMSC evaluated with no signs of recurrence on thumb   No lesions suspicious for malignancy noted.    Recommend daily sun protection/avoidance and use of at least SPF 30, broad spectrum sunscreen (OTC drug).       Skin cancer  -     Ambulatory referral/consult to Dermatology    Seborrheic keratoses  Seborrheic keratosis scattered, told benign no treatment needed.  Brochure provided.      Garza angiomas  This is a benign vascular lesion. Reassurance given. No treatment required.       Lentigines  The "ABCD" rules to observe pigmented lesions were reviewed.      Nevus, multiple  The "ABCD" rules to observe pigmented lesions were reviewed.  Brochure provided               Follow up in about 1 year (around 5/31/2025).    "

## 2024-06-04 ENCOUNTER — LAB VISIT (OUTPATIENT)
Dept: LAB | Facility: HOSPITAL | Age: 69
End: 2024-06-04
Attending: INTERNAL MEDICINE
Payer: MEDICARE

## 2024-06-04 DIAGNOSIS — Z94.4 S/P LIVER TRANSPLANT: ICD-10-CM

## 2024-06-04 LAB
ALBUMIN SERPL BCP-MCNC: 3.7 G/DL (ref 3.5–5.2)
ALP SERPL-CCNC: 77 U/L (ref 55–135)
ALT SERPL W/O P-5'-P-CCNC: 18 U/L (ref 10–44)
ANION GAP SERPL CALC-SCNC: 12 MMOL/L (ref 8–16)
AST SERPL-CCNC: 22 U/L (ref 10–40)
BASOPHILS # BLD AUTO: 0.06 K/UL (ref 0–0.2)
BASOPHILS NFR BLD: 0.9 % (ref 0–1.9)
BILIRUB SERPL-MCNC: 0.5 MG/DL (ref 0.1–1)
BUN SERPL-MCNC: 41 MG/DL (ref 8–23)
CALCIUM SERPL-MCNC: 9.4 MG/DL (ref 8.7–10.5)
CHLORIDE SERPL-SCNC: 106 MMOL/L (ref 95–110)
CO2 SERPL-SCNC: 24 MMOL/L (ref 23–29)
CREAT SERPL-MCNC: 1.9 MG/DL (ref 0.5–1.4)
DIFFERENTIAL METHOD BLD: ABNORMAL
EOSINOPHIL # BLD AUTO: 0.2 K/UL (ref 0–0.5)
EOSINOPHIL NFR BLD: 3.2 % (ref 0–8)
ERYTHROCYTE [DISTWIDTH] IN BLOOD BY AUTOMATED COUNT: 19.4 % (ref 11.5–14.5)
EST. GFR  (NO RACE VARIABLE): 37.5 ML/MIN/1.73 M^2
GLUCOSE SERPL-MCNC: 76 MG/DL (ref 70–110)
HCT VFR BLD AUTO: 48.1 % (ref 40–54)
HGB BLD-MCNC: 15.2 G/DL (ref 14–18)
IMM GRANULOCYTES # BLD AUTO: 0.02 K/UL (ref 0–0.04)
IMM GRANULOCYTES NFR BLD AUTO: 0.3 % (ref 0–0.5)
LYMPHOCYTES # BLD AUTO: 1.4 K/UL (ref 1–4.8)
LYMPHOCYTES NFR BLD: 19.7 % (ref 18–48)
MAGNESIUM SERPL-MCNC: 2.5 MG/DL (ref 1.6–2.6)
MCH RBC QN AUTO: 28.3 PG (ref 27–31)
MCHC RBC AUTO-ENTMCNC: 31.6 G/DL (ref 32–36)
MCV RBC AUTO: 89 FL (ref 82–98)
MONOCYTES # BLD AUTO: 1 K/UL (ref 0.3–1)
MONOCYTES NFR BLD: 13.8 % (ref 4–15)
NEUTROPHILS # BLD AUTO: 4.3 K/UL (ref 1.8–7.7)
NEUTROPHILS NFR BLD: 62.1 % (ref 38–73)
NRBC BLD-RTO: 0 /100 WBC
PLATELET # BLD AUTO: 99 K/UL (ref 150–450)
PMV BLD AUTO: 11.5 FL (ref 9.2–12.9)
POTASSIUM SERPL-SCNC: 4.2 MMOL/L (ref 3.5–5.1)
PROT SERPL-MCNC: 6.8 G/DL (ref 6–8.4)
RBC # BLD AUTO: 5.38 M/UL (ref 4.6–6.2)
SODIUM SERPL-SCNC: 142 MMOL/L (ref 136–145)
WBC # BLD AUTO: 6.97 K/UL (ref 3.9–12.7)

## 2024-06-04 PROCEDURE — 85025 COMPLETE CBC W/AUTO DIFF WBC: CPT | Performed by: INTERNAL MEDICINE

## 2024-06-04 PROCEDURE — 80197 ASSAY OF TACROLIMUS: CPT | Performed by: INTERNAL MEDICINE

## 2024-06-04 PROCEDURE — 80053 COMPREHEN METABOLIC PANEL: CPT | Performed by: INTERNAL MEDICINE

## 2024-06-04 PROCEDURE — 83735 ASSAY OF MAGNESIUM: CPT | Performed by: INTERNAL MEDICINE

## 2024-06-04 PROCEDURE — 36415 COLL VENOUS BLD VENIPUNCTURE: CPT | Mod: PN | Performed by: INTERNAL MEDICINE

## 2024-06-05 LAB — TACROLIMUS BLD-MCNC: 5.8 NG/ML (ref 5–15)

## 2024-06-07 ENCOUNTER — TELEPHONE (OUTPATIENT)
Dept: TRANSPLANT | Facility: CLINIC | Age: 69
End: 2024-06-07
Payer: MEDICARE

## 2024-06-07 NOTE — TELEPHONE ENCOUNTER
Sent message to let patient know to hydrate well and repeat labs next week.  ----- Message from Thais Maxwell MD sent at 6/7/2024 12:59 PM CDT -----  The Labs are stable except creatinine. Pt to drink more fluids and repeat labs in one week - please let patient know.

## 2024-06-11 ENCOUNTER — PATIENT MESSAGE (OUTPATIENT)
Dept: TRANSPLANT | Facility: CLINIC | Age: 69
End: 2024-06-11
Payer: MEDICARE

## 2024-06-11 DIAGNOSIS — E11.65 TYPE 2 DIABETES MELLITUS WITH HYPERGLYCEMIA, WITH LONG-TERM CURRENT USE OF INSULIN: ICD-10-CM

## 2024-06-11 DIAGNOSIS — Z79.4 TYPE 2 DIABETES MELLITUS WITH HYPERGLYCEMIA, WITH LONG-TERM CURRENT USE OF INSULIN: ICD-10-CM

## 2024-06-12 ENCOUNTER — LAB VISIT (OUTPATIENT)
Dept: LAB | Facility: HOSPITAL | Age: 69
End: 2024-06-12
Attending: INTERNAL MEDICINE
Payer: MEDICARE

## 2024-06-12 DIAGNOSIS — Z94.4 S/P LIVER TRANSPLANT: ICD-10-CM

## 2024-06-12 LAB
ALBUMIN SERPL BCP-MCNC: 3.6 G/DL (ref 3.5–5.2)
ALP SERPL-CCNC: 79 U/L (ref 55–135)
ALT SERPL W/O P-5'-P-CCNC: 29 U/L (ref 10–44)
ANION GAP SERPL CALC-SCNC: 9 MMOL/L (ref 8–16)
AST SERPL-CCNC: 29 U/L (ref 10–40)
BASOPHILS # BLD AUTO: 0.07 K/UL (ref 0–0.2)
BASOPHILS NFR BLD: 0.9 % (ref 0–1.9)
BILIRUB SERPL-MCNC: 0.8 MG/DL (ref 0.1–1)
BUN SERPL-MCNC: 34 MG/DL (ref 8–23)
CALCIUM SERPL-MCNC: 9.6 MG/DL (ref 8.7–10.5)
CHLORIDE SERPL-SCNC: 102 MMOL/L (ref 95–110)
CO2 SERPL-SCNC: 27 MMOL/L (ref 23–29)
CREAT SERPL-MCNC: 1.8 MG/DL (ref 0.5–1.4)
DIFFERENTIAL METHOD BLD: ABNORMAL
EOSINOPHIL # BLD AUTO: 0.3 K/UL (ref 0–0.5)
EOSINOPHIL NFR BLD: 3.9 % (ref 0–8)
ERYTHROCYTE [DISTWIDTH] IN BLOOD BY AUTOMATED COUNT: 18.9 % (ref 11.5–14.5)
EST. GFR  (NO RACE VARIABLE): 40 ML/MIN/1.73 M^2
GLUCOSE SERPL-MCNC: 64 MG/DL (ref 70–110)
HCT VFR BLD AUTO: 50 % (ref 40–54)
HGB BLD-MCNC: 15.6 G/DL (ref 14–18)
IMM GRANULOCYTES # BLD AUTO: 0.02 K/UL (ref 0–0.04)
IMM GRANULOCYTES NFR BLD AUTO: 0.3 % (ref 0–0.5)
LYMPHOCYTES # BLD AUTO: 1.7 K/UL (ref 1–4.8)
LYMPHOCYTES NFR BLD: 23.2 % (ref 18–48)
MAGNESIUM SERPL-MCNC: 2.3 MG/DL (ref 1.6–2.6)
MCH RBC QN AUTO: 28.4 PG (ref 27–31)
MCHC RBC AUTO-ENTMCNC: 31.2 G/DL (ref 32–36)
MCV RBC AUTO: 91 FL (ref 82–98)
MONOCYTES # BLD AUTO: 0.8 K/UL (ref 0.3–1)
MONOCYTES NFR BLD: 10.7 % (ref 4–15)
NEUTROPHILS # BLD AUTO: 4.5 K/UL (ref 1.8–7.7)
NEUTROPHILS NFR BLD: 61 % (ref 38–73)
NRBC BLD-RTO: 0 /100 WBC
PLATELET # BLD AUTO: 109 K/UL (ref 150–450)
PMV BLD AUTO: 12.2 FL (ref 9.2–12.9)
POTASSIUM SERPL-SCNC: 4.2 MMOL/L (ref 3.5–5.1)
PROT SERPL-MCNC: 7.1 G/DL (ref 6–8.4)
RBC # BLD AUTO: 5.5 M/UL (ref 4.6–6.2)
SODIUM SERPL-SCNC: 138 MMOL/L (ref 136–145)
WBC # BLD AUTO: 7.45 K/UL (ref 3.9–12.7)

## 2024-06-12 PROCEDURE — 80053 COMPREHEN METABOLIC PANEL: CPT | Performed by: INTERNAL MEDICINE

## 2024-06-12 PROCEDURE — 80197 ASSAY OF TACROLIMUS: CPT | Performed by: INTERNAL MEDICINE

## 2024-06-12 PROCEDURE — 85025 COMPLETE CBC W/AUTO DIFF WBC: CPT | Performed by: INTERNAL MEDICINE

## 2024-06-12 PROCEDURE — 83735 ASSAY OF MAGNESIUM: CPT | Performed by: INTERNAL MEDICINE

## 2024-06-12 PROCEDURE — 36415 COLL VENOUS BLD VENIPUNCTURE: CPT | Mod: PN | Performed by: INTERNAL MEDICINE

## 2024-06-12 RX ORDER — PEN NEEDLE, DIABETIC 30 GX3/16"
NEEDLE, DISPOSABLE MISCELLANEOUS
Qty: 200 EACH | Refills: 11 | Status: SHIPPED | OUTPATIENT
Start: 2024-06-12

## 2024-06-13 LAB — TACROLIMUS BLD-MCNC: 6.8 NG/ML (ref 5–15)

## 2024-06-14 DIAGNOSIS — Z94.4 S/P LIVER TRANSPLANT: ICD-10-CM

## 2024-06-14 RX ORDER — TACROLIMUS 0.5 MG/1
0.5 CAPSULE ORAL DAILY
Qty: 60 CAPSULE | Refills: 11 | Status: SHIPPED | OUTPATIENT
Start: 2024-06-14

## 2024-06-14 NOTE — TELEPHONE ENCOUNTER
Sent patient message via portal to let him know labs are stable.  No medication changes, next labs due on 6/24/24      ----- Message from Thais Maxwell MD sent at 6/14/2024  3:14 PM CDT -----  The Labs are stable except creatinine; lower to 0.5 mg daily from bid and repeat labs in  - please let patient know.

## 2024-06-16 NOTE — PROGRESS NOTES
Subjective:      Patient ID: Tej Purdy is a 68 y.o. male.    Chief Complaint: Wound Care (Left great toe and heel ), Diabetes Mellitus (Tingling ), and Nail Care    Tej Purdy is a 68 y.o. male with  has a past medical history of Acute appendicitis, Aortic stenosis, Atrial fibrillation, Calculus of ureter, CHF (congestive heart failure), Cholangiocarcinoma, Cirrhosis, Diabetes mellitus, type 2, HTN (hypertension), Hyperlipidemia, Kidney stones, PAD (peripheral artery disease), and Skin cancer. presents to the podiatry clinic for care of  left foot ulcer.   Location: posterior heel. Onset of the symptoms was several days ago. Precipitating event:  likely a new shoe .   Current symptoms include: redness, drainage, color change. Signs of systemic infection: patient denies nausea, vomiting, fever, chills  Symptoms have progressed to a point and plateaued. Patient has had no prior foot problems. Evaluation to date: none.  Patients rates pain 4/10 on pain scale.    Wears toe ankle socks      5.28.24:  Patient presents for follow up evaluation of left great toe lesion and left heel lesion.  States walking boot has caused some irritation to his hip.  We will like to discontinue usage.      Chief Complaint   Patient presents with    Wound Care     Left great toe and heel     Diabetes Mellitus     Tingling     Nail Care       Hemoglobin A1C   Date Value Ref Range Status   01/26/2024 7.5 (H) 4.0 - 5.6 % Final     Comment:     ADA Screening Guidelines:  5.7-6.4%  Consistent with prediabetes  >or=6.5%  Consistent with diabetes    High levels of fetal hemoglobin interfere with the HbA1C  assay. Heterozygous hemoglobin variants (HbS, HgC, etc)do  not significantly interfere with this assay.   However, presence of multiple variants may affect accuracy.     12/23/2023 6.5 (H) 4.0 - 5.6 % Final     Comment:     ADA Screening Guidelines:  5.7-6.4%  Consistent with prediabetes  >or=6.5%  Consistent with diabetes    High  levels of fetal hemoglobin interfere with the HbA1C  assay. Heterozygous hemoglobin variants (HbS, HgC, etc)do  not significantly interfere with this assay.   However, presence of multiple variants may affect accuracy.     06/01/2023 6.2 (H) 4.0 - 5.6 % Final     Comment:     ADA Screening Guidelines:  5.7-6.4%  Consistent with prediabetes  >or=6.5%  Consistent with diabetes    High levels of fetal hemoglobin interfere with the HbA1C  assay. Heterozygous hemoglobin variants (HbS, HgC, etc)do  not significantly interfere with this assay.   However, presence of multiple variants may affect accuracy.                 Patient Active Problem List   Diagnosis    Hyperlipidemia    Primary hypertension    Skin cancer    Kidney stone    Diabetic neuropathy    PAD (peripheral artery disease)    Abdominal pain    Type 2 diabetes mellitus, with long-term current use of insulin    Coronary artery disease involving native coronary artery of native heart without angina pectoris    Persistent atrial fibrillation    History of cardioversion    Current use of anticoagulant therapy    S/P liver transplant    At risk for opportunistic infections    Prophylactic immunotherapy    Anemia of chronic disease    Long-term use of immunosuppressant medication    Weakness    Type 2 diabetes mellitus with hyperglycemia    Anemia due to unknown mechanism    Fatigue    Other ascites    Cholangiocarcinoma    Aortic stenosis    Unstable angina    Presence of drug-eluting stent in left circumflex coronary artery    Orthostatic dizziness    Nephrolithiasis    Acute on chronic heart failure with reduced ejection fraction (HFrEF)    History of liver transplant    Iron deficiency anemia    Immunodeficiency due to drugs    S/P TAVR (transcatheter aortic valve replacement)    CHF (congestive heart failure)       Current Outpatient Medications on File Prior to Visit   Medication Sig Dispense Refill    apixaban (ELIQUIS) 5 mg Tab Take 1 tablet (5 mg total) by  mouth 2 (two) times daily. 180 tablet 3    atorvastatin (LIPITOR) 80 MG tablet Take 1 tablet (80 mg total) by mouth once daily. 90 tablet 3    blood sugar diagnostic Strp To check BG 2 times daily, to use with insurance preferred meter (patient has a [employer] self-monitoring glucose meter) 100 strip 11    blood-glucose meter kit To check BG 2 times daily, to use with insurance preferred meter 1 each 0    buPROPion (WELLBUTRIN XL) 150 MG TB24 tablet Take 1 tablet (150 mg total) by mouth every morning. 90 tablet 1    carvediloL (COREG) 12.5 MG tablet Take 1 tablet (12.5 mg total) by mouth 2 (two) times daily with meals. 180 tablet 3    clopidogreL (PLAVIX) 75 mg tablet Take 1 tablet (75 mg total) by mouth once daily. 90 tablet 3    doxycycline (VIBRA-TABS) 100 MG tablet Take 1 tablet (100 mg total) by mouth 2 (two) times daily. 20 tablet 0    empagliflozin (JARDIANCE) 10 mg tablet Take 1 tablet (10 mg total) by mouth once daily. 90 tablet 3    ferrous sulfate (IRON) 325 mg (65 mg iron) Tab tablet Take 1 tablet (325 mg total) by mouth once daily. 30 tablet 2    furosemide (LASIX) 20 MG tablet Take 1 tablet (20 mg total) by mouth once daily. 90 tablet 3    insulin glargine U-100, Lantus, (LANTUS SOLOSTAR U-100 INSULIN) 100 unit/mL (3 mL) InPn pen Inject 22 Units into the skin every evening. 15 mL 11    insulin lispro (HUMALOG KWIKPEN INSULIN) 100 unit/mL pen Inject 6 Units into the skin 3 (three) times daily with meals. Plus sliding scale, MDD: 48 units 15 mL 5    lancets Misc To check BG 2 times daily, to use with insurance preferred meter 100 each 11    magnesium oxide (MAG-OX) 400 mg (241.3 mg magnesium) tablet Take 1 tablet (400 mg total) by mouth 2 (two) times daily. 60 tablet 11    multivitamin capsule Take 1 capsule by mouth once daily.      omega-3 fatty acids/fish oil (FISH OIL-OMEGA-3 FATTY ACIDS) 300-1,000 mg capsule Take 1 capsule by mouth once daily.       pantoprazole (PROTONIX) 40 MG tablet Take 1  tablet (40 mg total) by mouth once daily. 90 tablet 1    QUEtiapine (SEROQUEL) 100 MG Tab Take 1 tablet (100 mg total) by mouth every evening. 90 tablet 1    sacubitriL-valsartan (ENTRESTO) 49-51 mg per tablet Take 1 tablet by mouth 2 (two) times daily. 180 tablet 3     No current facility-administered medications on file prior to visit.       Review of patient's allergies indicates:   Allergen Reactions    Bee pollens Swelling     BEE STINGS swells body       Past Surgical History:   Procedure Laterality Date    ANGIOGRAM, CORONARY, WITH LEFT HEART CATHETERIZATION N/A 12/26/2023    Procedure: Angiogram, Coronary, with Left Heart Cath;  Surgeon: Carlos King MD;  Location: [facility] CATH LAB;  Service: Cardiology;  Laterality: N/A;    AORTIC VALVULOPLASTY N/A 2/1/2024    Procedure: REPAIR, AORTIC VALVE;  Surgeon: Shilo Carrasco MD;  Location: [facility] CATH LAB;  Service: Cardiology;  Laterality: N/A;    COLONOSCOPY  10/2020    ECHOCARDIOGRAM,TRANSESOPHAGEAL N/A 1/26/2024    Procedure: Transesophageal echo (ASHISH) intra-procedure log documentation;  Surgeon: Nick Mathur III, MD;  Location: [facility] EP LAB;  Service: Cardiology;  Laterality: N/A;    ESOPHAGEAL MANOMETRY WITH MEASUREMENT OF IMPEDANCE N/A 7/17/2023    Procedure: MANOMETRY, ESOPHAGUS, WITH IMPEDANCE MEASUREMENT;  Surgeon: Klarissa Zamora MD;  Location: [facility] ENDO (4TH FLR);  Service: Gastroenterology;  Laterality: N/A;  pt diabetic  liver txp  prep insructions sent to pt via email and portal -    ESOPHAGOGASTRODUODENOSCOPY  10/2020    ESOPHAGOGASTRODUODENOSCOPY N/A 7/1/2021    Procedure: EGD (ESOPHAGOGASTRODUODENOSCOPY);  Surgeon: Jovan David MD;  Location: [facility] ENDO (4TH FLR);  Service: Endoscopy;  Laterality: N/A;  HCC. Listed for liver transplant. EGD for variceal surveillance.  cardiac clearance and blood thinenr approval received, see telephone encounter 6/23/21-BB  fully vaccinated-BB  labs same day of procedure-BB    EXCISION OF  HYDROCELE Right     hemorroid surgery      hemorroidectomy      KIDNEY STONE SURGERY      LAPAROSCOPIC APPENDECTOMY N/A 6/2/2023    Procedure: APPENDECTOMY, LAPAROSCOPIC;  Surgeon: Shan Ross MD;  Location: [facility] OR [facility] FLR;  Service: General;  Laterality: N/A;    LEFT HEART CATHETERIZATION N/A 1/27/2021    Procedure: Left heart cath;  Surgeon: Kris Shelton MD;  Location: [facility] CATH LAB;  Service: Cardiology;  Laterality: N/A;    liver mass removal      LIVER TRANSPLANT N/A 1/6/2022    Procedure: TRANSPLANT, LIVER;  Surgeon: Lizet Garcia MD;  [facility];  Service: Transplant;  Laterality: N/A;    RIGHT HEART CATHETERIZATION Right 5/7/2021    Procedure: INSERTION, CATHETER, RIGHT HEART;  Surgeon: Jaden Schuster MD;  Location: [facility] CATH LAB;  Service: Cardiology;  Laterality: Right;    STENT, DRUG ELUTING, SINGLE VESSEL, CORONARY  12/26/2023    Procedure: Stent, Drug Eluting, Single Vessel, Coronary;  Surgeon: Carlos King MD;  Location: [facility] CATH LAB;  Service: Cardiology;;    TREATMENT OF CARDIAC ARRHYTHMIA N/A 5/19/2021    Procedure: CARDIOVERSION;  Surgeon: Didier Tilley MD;  Location: [facility] EP LAB;  Service: Cardiology;  Laterality: N/A;  a fib, dccv/johny, mac, dm. sscu    TREATMENT OF CARDIAC ARRHYTHMIA N/A 5/31/2021    Procedure: CARDIOVERSION;  Surgeon: Daniel Arambula MD;  Location: [facility] EP LAB;  Service: Cardiology;  Laterality: N/A;  afib, DCCV, anest., DM, 3 prep    TREATMENT OF CARDIAC ARRHYTHMIA N/A 7/7/2021    Procedure: Cardioversion or Defibrillation;  Surgeon: Didier Tilley MD;  Location: [facility] EP LAB;  Service: Cardiology;  Laterality: N/A;  AF, JOHNY (cancel if complaint), DCCV, MAC, DM, 3 Prep    TREATMENT OF CARDIAC ARRHYTHMIA N/A 7/27/2021    Procedure: Cardioversion or Defibrillation;  Surgeon: Didier Tilley MD;  Location: [facility] EP LAB;  Service: Cardiology;  Laterality: N/A;  AF, JOHNY (cx if complaint), DCCV, MAC, DM, 3 Prep    TREATMENT OF CARDIAC  ARRHYTHMIA N/A 2024    Procedure: Cardioversion or Defibrillation;  Surgeon: Koko Knight MD;  Location: [facility] EP LAB;  Service: Cardiology;  Laterality: N/A;  AF, ASHISH, DCCV, MAC, DM, 3 Prep *ADENOSINE TEST* *LIVER TRANSPLANT PATIENT*       Family History   Problem Relation Name Age of Onset    Coronary artery disease Father      Colon cancer Neg Hx      Esophageal cancer Neg Hx         Social History     Socioeconomic History    Marital status:    Tobacco Use    Smoking status: Former     Current packs/day: 0.00     Types: Cigarettes     Quit date: 1980     Years since quittin.4    Smokeless tobacco: Former   Substance and Sexual Activity    Alcohol use: Not Currently    Drug use: Never     Social Determinants of Health     Financial Resource Strain: Low Risk  (2024)    Overall Financial Resource Strain (CARDIA)     Difficulty of Paying Living Expenses: Not very hard   Food Insecurity: No Food Insecurity (2024)    Hunger Vital Sign     Worried About Running Out of Food in the Last Year: Never true     Ran Out of Food in the Last Year: Never true   Transportation Needs: No Transportation Needs (2024)    PRAPARE - Transportation     Lack of Transportation (Medical): No     Lack of Transportation (Non-Medical): No   Physical Activity: Inactive (2024)    Exercise Vital Sign     Days of Exercise per Week: 0 days     Minutes of Exercise per Session: 0 min   Stress: No Stress Concern Present (2024)    [employer] [place] [employer] - Occupational Stress Questionnaire     Feeling of Stress : Only a little   Recent Concern: Stress - Stress Concern Present (2023)    [employer] [place] [employer] - Occupational Stress Questionnaire     Feeling of Stress : Rather much   Housing Stability: Low Risk  (2024)    Housing Stability Vital Sign     Unable to Pay for Housing in the Last Year: No     Number of Places Lived in the Last Year: 1     Unstable  Housing in the Last Year: No       Review of Systems   Constitutional: Negative for chills and fever.   Cardiovascular:  Negative for claudication and leg swelling.   Respiratory:  Negative for cough and shortness of breath.    Skin:  Positive for dry skin and poor wound healing. Negative for itching and rash.   Musculoskeletal:  Positive for joint pain, myalgias and stiffness. Negative for falls, joint swelling and muscle weakness.   Gastrointestinal:  Negative for diarrhea, nausea and vomiting.   Neurological:  Positive for numbness and paresthesias. Negative for tremors and weakness.   Psychiatric/Behavioral:  Negative for altered mental status and hallucinations.            Objective:      Vitals:    05/28/24 1427   BP: (!) 145/87   Pulse: 76   Resp: 18   Weight: 99 kg (218 lb 4.1 oz)   Height: 6' (1.829 m)   PainSc: 0-No pain       Physical Exam  Vitals and nursing note reviewed.   Constitutional:       General: He is not in acute distress.     Appearance: He is well-developed. He is not toxic-appearing or diaphoretic.      Comments: alert and oriented x 3.    Cardiovascular:      Comments: Dorsalis pedis and posterior tibial pulses are diminished Bilaterally. Toes are cool to touch. Feet are warm proximally.There is decreased digital hair. Skin is atrophic  Pulmonary:      Effort: No respiratory distress.   Musculoskeletal:         General: No deformity.      Right ankle: No tenderness. No lateral malleolus, medial malleolus, AITF ligament, CF ligament or posterior TF ligament tenderness.      Right Achilles Tendon: No defects. Sanchez's test negative.      Left ankle: No tenderness. No lateral malleolus, medial malleolus, AITF ligament, CF ligament or posterior TF ligament tenderness.      Left Achilles Tendon: No defects. Sanchez's test negative.      Right foot: No tenderness or bony tenderness.      Left foot: No tenderness or bony tenderness.      Comments: Adequate joint range of motion without pain,  limitation, nor crepitation Bilateral feet and ankle joints. Muscle strength is 5/5 in all groups bilaterally.           Feet:      Right foot:      Skin integrity: Skin integrity normal.      Left foot:      Skin integrity: Ulcer (see below) and erythema present.      Toenail Condition: Left toenails are ingrown.   Lymphadenopathy:      Comments: No lymphatic streaking     Skin:     General: Skin is warm and dry.      Coloration: Skin is not pale.      Findings: No rash.      Nails: There is no clubbing.   Neurological:      Sensory: Sensory deficit present.      Motor: No atrophy.      Comments: [place] 5.07 monofilamant testing is diminished Stanley feet.      Psychiatric:         Attention and Perception: He is attentive.         Mood and Affect: Mood is not anxious. Affect is not inappropriate.         Speech: He is communicative. Speech is not slurred.         Behavior: Behavior is not combative.       5/20/2024    Ulcer location: posterior left heel  Measurements : 0.4x0.6x0.2  cm   Signs of infection: local edema and erythema  Drainage: Sero-Sanguinous  Purulence: No  Crepitus/fluctuance: No  Periwound: Reddened, Macerated, Calloused  Base: Fibrotic slough  Depth: subcutaneous tissue  Probe to bone: No        Edema and erythema to lateral nail border        5.28.24  No surrounding erythema, edema, malodor, nor drainage noted             Assessment:       Encounter Diagnosis   Name Primary?    Type II diabetes mellitus with peripheral circulatory disorder Yes         Plan:     Problem List Items Addressed This Visit    None  Visit Diagnoses       Type II diabetes mellitus with peripheral circulatory disorder    -  Primary           I counseled the patient on his conditions, their implications and medical management.    Education about the prevention of limb loss.    Discussed wound healing cycle, skin integrity, ways to care for skin.Counseled patient on the effects of PAD, biomechanical pressure, and  high blood glucose on healing. He verbalizes understanding that it can increase the chances of delayed healing and this prolonged exposure leads to infection or progression of infection which subsequently can result in loss of limb.    Adequate vitamin supplementation, protein intake, and hydration - discussed with patient    Imaging ordered to rule out bone involvement or gas in the soft tissues.     The wound is cleansed of foreign material as much as possible and the base inspected for bone or abscess.  Base is fibrotic and without bone nor joint exposure. Aerobic and anerobic cultures swabs taken    Dressings: iodosorb  Offloading: football and ok to transition to [facility]    Follow-up: 1 week but should call [facility] immediately if any signs of infection, such as fever, chills, sweats, increased redness or pain.    Short-term goals include maintaining good offloading and minimizing bioburden, promoting granulation and epithelialization to healing.  Long-term goals include keeping the wound healed by good offloading and medical management under the direction of internist.    Education about the diabetic foot, neuropathy, and prevention of limb loss.

## 2024-06-24 ENCOUNTER — LAB VISIT (OUTPATIENT)
Dept: LAB | Facility: HOSPITAL | Age: 69
End: 2024-06-24
Attending: INTERNAL MEDICINE
Payer: MEDICARE

## 2024-06-24 ENCOUNTER — PATIENT MESSAGE (OUTPATIENT)
Dept: TRANSPLANT | Facility: CLINIC | Age: 69
End: 2024-06-24
Payer: MEDICARE

## 2024-06-24 DIAGNOSIS — C22.1 CHOLANGIOCARCINOMA: ICD-10-CM

## 2024-06-24 DIAGNOSIS — Z94.4 S/P LIVER TRANSPLANT: ICD-10-CM

## 2024-06-24 LAB
ALBUMIN SERPL BCP-MCNC: 3.8 G/DL (ref 3.5–5.2)
ALP SERPL-CCNC: 72 U/L (ref 55–135)
ALT SERPL W/O P-5'-P-CCNC: 18 U/L (ref 10–44)
ANION GAP SERPL CALC-SCNC: 11 MMOL/L (ref 8–16)
AST SERPL-CCNC: 24 U/L (ref 10–40)
BASOPHILS # BLD AUTO: 0.05 K/UL (ref 0–0.2)
BASOPHILS NFR BLD: 0.6 % (ref 0–1.9)
BILIRUB SERPL-MCNC: 0.8 MG/DL (ref 0.1–1)
BUN SERPL-MCNC: 31 MG/DL (ref 8–23)
CALCIUM SERPL-MCNC: 9.8 MG/DL (ref 8.7–10.5)
CANCER AG19-9 SERPL-ACNC: <2.1 U/ML (ref 0–40)
CHLORIDE SERPL-SCNC: 103 MMOL/L (ref 95–110)
CO2 SERPL-SCNC: 27 MMOL/L (ref 23–29)
CREAT SERPL-MCNC: 1.5 MG/DL (ref 0.5–1.4)
DIFFERENTIAL METHOD BLD: ABNORMAL
EOSINOPHIL # BLD AUTO: 0.3 K/UL (ref 0–0.5)
EOSINOPHIL NFR BLD: 3.7 % (ref 0–8)
ERYTHROCYTE [DISTWIDTH] IN BLOOD BY AUTOMATED COUNT: 18.5 % (ref 11.5–14.5)
EST. GFR  (NO RACE VARIABLE): 49.8 ML/MIN/1.73 M^2
GLUCOSE SERPL-MCNC: 75 MG/DL (ref 70–110)
HCT VFR BLD AUTO: 51.7 % (ref 40–54)
HGB BLD-MCNC: 16.2 G/DL (ref 14–18)
IMM GRANULOCYTES # BLD AUTO: 0.04 K/UL (ref 0–0.04)
IMM GRANULOCYTES NFR BLD AUTO: 0.5 % (ref 0–0.5)
LYMPHOCYTES # BLD AUTO: 1.5 K/UL (ref 1–4.8)
LYMPHOCYTES NFR BLD: 17.6 % (ref 18–48)
MCH RBC QN AUTO: 28.8 PG (ref 27–31)
MCHC RBC AUTO-ENTMCNC: 31.3 G/DL (ref 32–36)
MCV RBC AUTO: 92 FL (ref 82–98)
MONOCYTES # BLD AUTO: 0.9 K/UL (ref 0.3–1)
MONOCYTES NFR BLD: 10.8 % (ref 4–15)
NEUTROPHILS # BLD AUTO: 5.5 K/UL (ref 1.8–7.7)
NEUTROPHILS NFR BLD: 66.8 % (ref 38–73)
NRBC BLD-RTO: 0 /100 WBC
PLATELET # BLD AUTO: 94 K/UL (ref 150–450)
PMV BLD AUTO: 11.7 FL (ref 9.2–12.9)
POTASSIUM SERPL-SCNC: 4.2 MMOL/L (ref 3.5–5.1)
PROT SERPL-MCNC: 7.1 G/DL (ref 6–8.4)
RBC # BLD AUTO: 5.62 M/UL (ref 4.6–6.2)
SODIUM SERPL-SCNC: 141 MMOL/L (ref 136–145)
WBC # BLD AUTO: 8.3 K/UL (ref 3.9–12.7)

## 2024-06-24 PROCEDURE — 85025 COMPLETE CBC W/AUTO DIFF WBC: CPT | Performed by: INTERNAL MEDICINE

## 2024-06-24 PROCEDURE — 86301 IMMUNOASSAY TUMOR CA 19-9: CPT | Performed by: INTERNAL MEDICINE

## 2024-06-24 PROCEDURE — 80053 COMPREHEN METABOLIC PANEL: CPT | Performed by: INTERNAL MEDICINE

## 2024-06-24 PROCEDURE — 80197 ASSAY OF TACROLIMUS: CPT | Performed by: INTERNAL MEDICINE

## 2024-06-24 PROCEDURE — 36415 COLL VENOUS BLD VENIPUNCTURE: CPT | Mod: PN | Performed by: INTERNAL MEDICINE

## 2024-06-25 ENCOUNTER — TELEPHONE (OUTPATIENT)
Dept: TRANSPLANT | Facility: CLINIC | Age: 69
End: 2024-06-25
Payer: MEDICARE

## 2024-06-25 DIAGNOSIS — C22.1 CHOLANGIOCARCINOMA: Primary | ICD-10-CM

## 2024-06-25 LAB — TACROLIMUS BLD-MCNC: 3.9 NG/ML (ref 5–15)

## 2024-06-25 NOTE — TELEPHONE ENCOUNTER
Sent patient message via portal to let him know labs are stable.  No medication changes, next labs due on 9/16/24      ----- Message from Thais Maxwell MD sent at 6/25/2024  9:06 AM CDT -----  The Labs are stable - please let patient know.

## 2024-08-07 DIAGNOSIS — F32.A DEPRESSION, UNSPECIFIED DEPRESSION TYPE: ICD-10-CM

## 2024-08-08 RX ORDER — QUETIAPINE FUMARATE 100 MG/1
100 TABLET, FILM COATED ORAL NIGHTLY
Qty: 90 TABLET | Refills: 0 | Status: SHIPPED | OUTPATIENT
Start: 2024-08-08

## 2024-08-23 ENCOUNTER — PATIENT MESSAGE (OUTPATIENT)
Dept: PODIATRY | Facility: CLINIC | Age: 69
End: 2024-08-23
Payer: MEDICARE

## 2024-08-25 ENCOUNTER — HOSPITAL ENCOUNTER (EMERGENCY)
Facility: HOSPITAL | Age: 69
Discharge: HOME | End: 2024-08-26
Attending: EMERGENCY MEDICINE
Payer: MEDICARE

## 2024-08-25 DIAGNOSIS — S93.401A SPRAIN OF RIGHT ANKLE, UNSPECIFIED LIGAMENT, INITIAL ENCOUNTER: Primary | ICD-10-CM

## 2024-08-25 DIAGNOSIS — S99.921A RIGHT FOOT INJURY, INITIAL ENCOUNTER: ICD-10-CM

## 2024-08-25 DIAGNOSIS — M25.571 RIGHT ANKLE PAIN: ICD-10-CM

## 2024-08-25 DIAGNOSIS — R07.89 STERNUM PAIN: ICD-10-CM

## 2024-08-25 DIAGNOSIS — S22.22XA FRACTURE OF BODY OF STERNUM, INITIAL ENCOUNTER FOR CLOSED FRACTURE: ICD-10-CM

## 2024-08-25 LAB
BUN SERPL-MCNC: 42 MG/DL (ref 6–30)
CHLORIDE SERPL-SCNC: 103 MMOL/L (ref 95–110)
CREAT SERPL-MCNC: 1.6 MG/DL (ref 0.5–1.4)
GLUCOSE SERPL-MCNC: 109 MG/DL (ref 70–110)
HCT VFR BLD CALC: 39 %PCV (ref 36–54)
POC IONIZED CALCIUM: 1.16 MMOL/L (ref 1.06–1.42)
POC TCO2 (MEASURED): 27 MMOL/L (ref 23–29)
POTASSIUM BLD-SCNC: 4.7 MMOL/L (ref 3.5–5.1)
SAMPLE: ABNORMAL
SODIUM BLD-SCNC: 138 MMOL/L (ref 136–145)

## 2024-08-25 PROCEDURE — 99285 EMERGENCY DEPT VISIT HI MDM: CPT | Mod: 25

## 2024-08-25 PROCEDURE — 80047 BASIC METABLC PNL IONIZED CA: CPT

## 2024-08-25 PROCEDURE — 25000003 PHARM REV CODE 250

## 2024-08-25 PROCEDURE — 25500020 PHARM REV CODE 255: Performed by: EMERGENCY MEDICINE

## 2024-08-25 RX ORDER — HYDROCODONE BITARTRATE AND ACETAMINOPHEN 5; 325 MG/1; MG/1
1 TABLET ORAL
Status: COMPLETED | OUTPATIENT
Start: 2024-08-25 | End: 2024-08-25

## 2024-08-25 RX ADMIN — IOHEXOL 100 ML: 350 INJECTION, SOLUTION INTRAVENOUS at 10:08

## 2024-08-25 RX ADMIN — HYDROCODONE BITARTRATE AND ACETAMINOPHEN 1 TABLET: 5; 325 TABLET ORAL at 05:08

## 2024-08-25 NOTE — ED NOTES
Patient identifiers for Tej Purdy 68 y.o. male checked and correct.  Chief Complaint   Patient presents with    Fall     Fall 2 weeks ago, right foot folded under body weight, pt also reports bruises to ribcage and left shoulder pain. Bruising, redness, and swelling to right foot noted.      Past Medical History:   Diagnosis Date    Acute appendicitis 06/02/2023    Aortic stenosis     Atrial fibrillation     johny/cardioversion on 1/26/24    Calculus of ureter 12/22/2023    CHF (congestive heart failure)     Cholangiocarcinoma 03/16/2022    s/p liver transplant    Cirrhosis 11/23/2020    s/p transplant 2022 for CASTILLO cirrhosis and cholangiocarcinoma    Diabetes mellitus, type 2     c/b Diabetic neuropathy    HTN (hypertension) 11/23/2020    Hyperlipidemia 11/23/2020    Kidney stones 11/23/2020    S/p lithotripsy 2020    PAD (peripheral artery disease)     Skin cancer 11/23/2020     LOC: Patient is awake, alert, and aware of environment with an appropriate affect. Patient is oriented x 3 and speaking appropriately.  APPEARANCE: Patient resting comfortably and in no acute distress. Patient is clean and well groomed, patient's clothing is properly fastened.  HEENT: WDL  SKIN: The skin is warm and dry. Patient has normal skin turgor and moist mucus membranes. Skin is intact; no bruising or breakdown noted.  MUSKULOSKELETAL: + left shoulder pain, + right foot pain; difficulty w/ ambulation r/t right foot injury  RESPIRATORY: Airway is open and patent. Respirations are spontaneous and non-labored with normal effort and rate, BBS=clear  CARDIAC: Patient has a normal rate and rhythm. No peripheral edema noted.   ABDOMEN: No distention noted. Bowel sounds active in all 4 quadrants. Soft and non-tender upon palpation.  NEUROLOGICAL: PERRL. Facial expression is symmetrical. Hand grasps are equal bilaterally. Normal sensation in all extremities when touched with finger.

## 2024-08-25 NOTE — ED PROVIDER NOTES
Encounter Date: 8/25/2024       History     Chief Complaint   Patient presents with    Fall     Fall 2 weeks ago, right foot folded under body weight, pt also reports bruises to ribcage and left shoulder pain. Bruising, redness, and swelling to right foot noted.      Tej Purdy is a 68-year-old male with history of liver transplant in 2022 and multiple cardiac conditions requiring both anticoagulation and antiplatelet presenting to the hospital 2 weeks after mechanical ground level fall.  Roughly 2 weeks ago, patient was at [facility] by the swimming pool when he slipped.  He recalls catching his right foot and rolling it in a hyper plantar flexion type movement.  He additionally fell forward and impacted his chest and left shoulder.  He denies a head strike or any loss of consciousness.  No symptoms of chest pain, shortness of breath, acute loss of consciousness preceding or immediately following.  Since the fall, his ankle has remained swollen and stiff, with severely decreased range of motion, ecchymosis, and tenderness to palpation over the anterior and lateral aspect of the foot.  Additionally, he has severe chest wall tenderness and pain on deep inspiration.  He states that the pain is worst when he is sitting upright or bending forward, more comfortable when he is lying flat on his back.  He says he has mild bruising over his sternum and that any contact with his sternum is very painful.  Also endorsing mild pain in his right lateral ribs and left shoulder, but those are more manageable and mild.    Throughout these 2 weeks, patient has denied any abdominal pain, N/V/D, headache, changes to vision/hearing, unilateral weakness/numbness.    The history is provided by the patient.     Review of patient's allergies indicates:   Allergen Reactions    Bee pollens Swelling     BEE STINGS swells body     Past Medical History:   Diagnosis Date    Acute appendicitis 06/02/2023    Aortic stenosis      Atrial fibrillation     johny/cardioversion on 1/26/24    Calculus of ureter 12/22/2023    CHF (congestive heart failure)     Cholangiocarcinoma 03/16/2022    s/p liver transplant    Cirrhosis 11/23/2020    s/p transplant 2022 for CASTILLO cirrhosis and cholangiocarcinoma    Diabetes mellitus, type 2     c/b Diabetic neuropathy    HTN (hypertension) 11/23/2020    Hyperlipidemia 11/23/2020    Kidney stones 11/23/2020    S/p lithotripsy 2020    PAD (peripheral artery disease)     Skin cancer 11/23/2020     Past Surgical History:   Procedure Laterality Date    ANGIOGRAM, CORONARY, WITH LEFT HEART CATHETERIZATION N/A 12/26/2023    Procedure: Angiogram, Coronary, with Left Heart Cath;  Surgeon: Carlos King MD;  Location: [facility] CATH LAB;  Service: Cardiology;  Laterality: N/A;    AORTIC VALVULOPLASTY N/A 2/1/2024    Procedure: REPAIR, AORTIC VALVE;  Surgeon: Shilo Carrasco MD;  Location: [facility] CATH LAB;  Service: Cardiology;  Laterality: N/A;    COLONOSCOPY  10/2020    ECHOCARDIOGRAM,TRANSESOPHAGEAL N/A 1/26/2024    Procedure: Transesophageal echo (JOHNY) intra-procedure log documentation;  Surgeon: Nick Mathur III, MD;  Location: [facility] EP LAB;  Service: Cardiology;  Laterality: N/A;    ESOPHAGEAL MANOMETRY WITH MEASUREMENT OF IMPEDANCE N/A 7/17/2023    Procedure: MANOMETRY, ESOPHAGUS, WITH IMPEDANCE MEASUREMENT;  Surgeon: Klarissa Zamora MD;  Location: [facility] ENDO (4TH FLR);  Service: Gastroenterology;  Laterality: N/A;  pt diabetic  liver txp  prep insructions sent to pt via email and portal -    ESOPHAGOGASTRODUODENOSCOPY  10/2020    ESOPHAGOGASTRODUODENOSCOPY N/A 7/1/2021    Procedure: EGD (ESOPHAGOGASTRODUODENOSCOPY);  Surgeon: Jovan David MD;  Location: [facility] ENDO (4TH FLR);  Service: Endoscopy;  Laterality: N/A;  HCC. Listed for liver transplant. EGD for variceal surveillance.  cardiac clearance and blood thinenr approval received, see telephone encounter 6/23/21-BB  fully vaccinated-BB  labs same  day of procedure-BB    EXCISION OF HYDROCELE Right     hemorroid surgery      hemorroidectomy      KIDNEY STONE SURGERY      LAPAROSCOPIC APPENDECTOMY N/A 6/2/2023    Procedure: APPENDECTOMY, LAPAROSCOPIC;  Surgeon: Shan Ross MD;  [facility];  Service: General;  Laterality: N/A;    LEFT HEART CATHETERIZATION N/A 1/27/2021    Procedure: Left heart cath;  Surgeon: Kris Shelton MD;  Location: [facility] CATH LAB;  Service: Cardiology;  Laterality: N/A;    liver mass removal      LIVER TRANSPLANT N/A 1/6/2022    Procedure: TRANSPLANT, LIVER;  Surgeon: Lizet Garcia MD;  Location: [facility] OR [facility] FLR;  Service: Transplant;  Laterality: N/A;    RIGHT HEART CATHETERIZATION Right 5/7/2021    Procedure: INSERTION, CATHETER, RIGHT HEART;  Surgeon: Jaden Schuster MD;  Location: [facility] CATH LAB;  Service: Cardiology;  Laterality: Right;    STENT, DRUG ELUTING, SINGLE VESSEL, CORONARY  12/26/2023    Procedure: Stent, Drug Eluting, Single Vessel, Coronary;  Surgeon: Carlos King MD;  Location: [facility] CATH LAB;  Service: Cardiology;;    TREATMENT OF CARDIAC ARRHYTHMIA N/A 5/19/2021    Procedure: CARDIOVERSION;  Surgeon: Didier Tilley MD;  Location: [facility] EP LAB;  Service: Cardiology;  Laterality: N/A;  a fib, dccv/johny, mac, dm. sscu    TREATMENT OF CARDIAC ARRHYTHMIA N/A 5/31/2021    Procedure: CARDIOVERSION;  Surgeon: Daniel Arambula MD;  Location: [facility] EP LAB;  Service: Cardiology;  Laterality: N/A;  afib, DCCV, anest., DM, 3 prep    TREATMENT OF CARDIAC ARRHYTHMIA N/A 7/7/2021    Procedure: Cardioversion or Defibrillation;  Surgeon: Didier Tilley MD;  Location: [facility] EP LAB;  Service: Cardiology;  Laterality: N/A;  AF, JOHNY (cancel if complaint), DCCV, MAC, DM, 3 Prep    TREATMENT OF CARDIAC ARRHYTHMIA N/A 7/27/2021    Procedure: Cardioversion or Defibrillation;  Surgeon: Didier Tilley MD;  Location: [facility] EP LAB;  Service: Cardiology;  Laterality: N/A;  AF, JOHNY (cx if complaint), DCCV, MAC, DM, 3  Prep    TREATMENT OF CARDIAC ARRHYTHMIA N/A 2024    Procedure: Cardioversion or Defibrillation;  Surgeon: Koko Knight MD;  Location: [facility] LAB;  Service: Cardiology;  Laterality: N/A;  AF, ASHISH, DCCV, MAC, DM, 3 Prep *ADENOSINE TEST* *LIVER TRANSPLANT PATIENT*     Family History   Problem Relation Name Age of Onset    Coronary artery disease Father      Colon cancer Neg Hx      Esophageal cancer Neg Hx       Social History     Tobacco Use    Smoking status: Former     Current packs/day: 0.00     Types: Cigarettes     Quit date: 1980     Years since quittin.6    Smokeless tobacco: Former   Substance Use Topics    Alcohol use: Not Currently    Drug use: Never     Review of Systems    Physical Exam     Initial Vitals [24 1516]   BP Pulse Resp Temp SpO2   (!) 157/79 72 18 98.7 °F (37.1 °C) 96 %      MAP       --         Physical Exam    Vitals reviewed.  Constitutional: He appears well-developed and well-nourished.   HENT:   Head: Normocephalic and atraumatic.   Mouth/Throat: Oropharynx is clear and moist.   Eyes: Conjunctivae and EOM are normal. Pupils are equal, round, and reactive to light.   Neck: Neck supple. No tracheal deviation present. No JVD present.   Normal range of motion.  Cardiovascular:  Normal rate, regular rhythm, normal heart sounds and intact distal pulses.     Exam reveals no gallop and no friction rub.       No murmur heard.  Pulmonary/Chest: Breath sounds normal. No respiratory distress. He has no wheezes. He has no rhonchi. He has no rales. He exhibits tenderness.     Abdominal: Abdomen is soft. Bowel sounds are normal. There is no abdominal tenderness.   Musculoskeletal:      Right shoulder: Normal.      Left shoulder: Normal. No swelling, deformity, effusion, tenderness or bony tenderness. Normal range of motion.      Cervical back: Normal range of motion and neck supple.      Right ankle: Swelling, deformity and ecchymosis present. Tenderness present. Decreased range  of motion.      Left ankle: Normal.      Right foot: Decreased range of motion. Swelling, deformity, tenderness and bony tenderness present. No crepitus. Normal pulse.      Left foot: Normal.      Comments: Right DP and PT pulses difficult to palpate due to significant swelling, easily find audible with Doppler, extensive ecchymosis and swelling to right foot, skin warm, dry, well-perfused     Neurological: He is alert and oriented to person, place, and time. He has normal strength.   Skin: Skin is warm and dry.   Psychiatric: He has a normal mood and affect. His behavior is normal. Thought content normal.         ED Course   Procedures  Labs Reviewed   ISTAT PROCEDURE - Abnormal       Result Value    POC Glucose 109      POC BUN 42 (*)     POC Creatinine 1.6 (*)     POC Sodium 138      POC Potassium 4.7      POC Chloride 103      POC TCO2 (MEASURED) 27      POC Ionized Calcium 1.16      POC Hematocrit 39      Sample LATISHA     ISTAT CHEM8          Imaging Results              CT Chest With Contrast (In process)  Result time 08/25/24 22:37:51                     X-Ray Sternum (Final result)  Result time 08/25/24 18:43:30      Final result by Jan Narvaez DO (08/25/24 18:43:30)                   Impression:      Mildly displaced fracture of the body of the sternum, recommend correlation with point tenderness.      Electronically signed by: Jan Narvaez  Date:    08/25/2024  Time:    18:43               Narrative:    EXAMINATION:  XR STERNUM PA AND LATERAL    CLINICAL HISTORY:  Other chest pain    TECHNIQUE:  Two views of the sternum.    COMPARISON:  CT chest from 04/12/2024.    FINDINGS:  There is a mildly displaced fracture of the body of the sternum, not seen on CT from 04/12/2024.  No additional fractures are seen.  Remaining osseous structures are intact.  There are degenerative changes.  There are vascular calcifications.                                       X-Ray Shoulder 2 or More Views Left (Final  result)  Result time 08/25/24 17:58:20      Final result by Stan Braun MD (08/25/24 17:58:20)                   Impression:      No acute displaced fracture-dislocation identified.      Electronically signed by: Stan Braun MD  Date:    08/25/2024  Time:    17:58               Narrative:    EXAMINATION:  XR SHOULDER COMPLETE 2 OR MORE VIEWS LEFT    CLINICAL HISTORY:  left shoulder pain, fall;    TECHNIQUE:  Two or three views of the left shoulder were performed.    COMPARISON:  Chest radiograph 01/27/2024    FINDINGS:  Overall alignment is within normal limits. No displaced fracture, dislocation or destructive osseous process.  Osseous spurring at the undersurface of the acromion which could result in rotator cuff impingement.  Mild DJD about the shoulder.  No subcutaneous emphysema or radiopaque foreign body.                                       X-Ray Ankle Complete Right (Final result)  Result time 08/25/24 17:55:22      Final result by Stan Braun MD (08/25/24 17:55:22)                   Impression:      No acute displaced fracture-dislocation identified.      Electronically signed by: Stan Braun MD  Date:    08/25/2024  Time:    17:55               Narrative:    EXAMINATION:  XR ANKLE COMPLETE 3 VIEW RIGHT    CLINICAL HISTORY:  Pain in right ankle and joints of right foot    TECHNIQUE:  AP, lateral, and oblique images of the right ankle were performed.    COMPARISON:  None    FINDINGS:  Overall alignment is within normal limits.  Ankle mortise appears intact.  No displaced fracture, dislocation or destructive osseous process.  Minimal to mild DJD about the ankle and dorsal hindfoot.  Small plantar calcaneal spur.  Scattered atherosclerotic vascular calcifications noted.  No subcutaneous emphysema or radiopaque foreign body.                                       X-Ray Foot Complete Right (Final result)  Result time 08/25/24 17:57:08      Final result by Stan Braun MD (08/25/24 17:57:08)                    Impression:      Dorsal forefoot suspected nonspecific soft tissue swelling without acute displaced fracture-dislocation identified.      Electronically signed by: Stan Braun MD  Date:    08/25/2024  Time:    17:57               Narrative:    EXAMINATION:  XR FOOT COMPLETE 3 VIEW RIGHT    CLINICAL HISTORY:  . Unspecified injury of right foot, initial encounter    TECHNIQUE:  AP, lateral, and oblique views of the right foot were performed.    COMPARISON:  Corresponding right ankle series same day    FINDINGS:  Overall alignment is within normal limits. No displaced fracture, dislocation or destructive osseous process.  Lisfranc articulation is congruent.  Baseline minimal to mild DJD.  Suspected small bone island within the lateral malleolus.  Small plantar calcaneal spur.  Scattered atherosclerotic vascular calcifications noted.  Mild prominence of the dorsal soft tissues overlying the forefoot may reflect swelling.  No subcutaneous emphysema or radiopaque foreign body.                                       Medications   HYDROcodone-acetaminophen 5-325 mg per tablet 1 tablet (1 tablet Oral Given 8/25/24 1756)   iohexoL (OMNIPAQUE 350) injection 100 mL (100 mLs Intravenous Given 8/25/24 2221)     Medical Decision Making  Tej Purdy is a 68-year-old male with history of liver transplant and on anticoagulation and antiplatelet agent presenting 2 weeks after ground level fall with major complaints of right ankle pain and swelling and sternal tenderness.  History and physical exam as above.  Initial vital signs stable and not actionable.  Given inciting event roughly 2 weeks ago, decided to workup with targeted imaging to areas of acute complaint, deferred CT scan of head, C-spine, chest/abdomen/pelvis at this time given no continued symptoms since time of injury.  Ordered x-ray of right foot and ankle, sternum, and left shoulder.    X-ray of right foot and ankle showing no bony abnormality concerning  for fracture or dislocation.  Unremarkable left shoulder x-ray.  X-ray of sternum showing mildly displaced fracture of the sternal body.  Ordered CT chest with IV contrast to further characterize fracture and surrounding soft tissue changes.    At time of patient handoff, patient pending results of CT chest and discharge.  Ambulatory referral to orthopedics for right ankle and general surgery for chest wall trauma placed in discharge paperwork.  Care patient handed over to Dr Leidy Branch and Dr Franklin Kiser.    Amount and/or Complexity of Data Reviewed  Radiology: ordered.    Risk  Prescription drug management.                     Signed,    Tod Clay MD  Emergency Medicine, PGY-1  [facility]                   Clinical Impression:  Final diagnoses:  [S22.22XA] Fracture of body of sternum, initial encounter for closed fracture  [S93.401A] Sprain of right ankle, unspecified ligament, initial encounter (Primary)                 Tod Clay MD  Resident  08/25/24 0006

## 2024-08-25 NOTE — FIRST PROVIDER EVALUATION
Emergency Department TeleTriage Encounter Note      CHIEF COMPLAINT    Chief Complaint   Patient presents with    Fall     Fall 2 weeks ago, right foot folded under body weight, pt also reports bruises to ribcage and left shoulder pain. Bruising, redness, and swelling to right foot noted.        VITAL SIGNS   Initial Vitals [08/25/24 1516]   BP Pulse Resp Temp SpO2   (!) 157/79 72 18 98.7 °F (37.1 °C) 96 %      MAP       --            ALLERGIES    Review of patient's allergies indicates:   Allergen Reactions    Bee pollens Swelling     BEE STINGS swells body       PROVIDER TRIAGE NOTE  Patient presents with pain and swelling to right ankle and foot secondary to slip and fall at [employer] 2 weeks ago. Also reports soreness in the sternum worse with movement since the fall. Also pain in the left shoulder.       ORDERS  Labs Reviewed - No data to display    ED Orders (720h ago, onward)      None              Virtual Visit Note: The provider triage portion of this emergency department evaluation and documentation was performed via [employer], a HIPAA-compliant telemedicine application, in concert with a tele-presenter in the room. A face to face patient evaluation with one of my colleagues will occur once the patient is placed in an emergency department room.      DISCLAIMER: This note was prepared with [employer] voice recognition transcription software. Garbled syntax, mangled pronouns, and other bizarre constructions may be attributed to that software system.

## 2024-08-26 VITALS
RESPIRATION RATE: 18 BRPM | SYSTOLIC BLOOD PRESSURE: 130 MMHG | BODY MASS INDEX: 27.09 KG/M2 | HEIGHT: 72 IN | OXYGEN SATURATION: 96 % | TEMPERATURE: 98 F | HEART RATE: 60 BPM | DIASTOLIC BLOOD PRESSURE: 63 MMHG | WEIGHT: 200 LBS

## 2024-08-26 PROCEDURE — 25000003 PHARM REV CODE 250: Performed by: EMERGENCY MEDICINE

## 2024-08-26 RX ORDER — HYDROCODONE BITARTRATE AND ACETAMINOPHEN 5; 325 MG/1; MG/1
1 TABLET ORAL EVERY 6 HOURS PRN
Qty: 12 TABLET | Refills: 0 | Status: SHIPPED | OUTPATIENT
Start: 2024-08-26 | End: 2024-08-29

## 2024-08-26 RX ORDER — HYDROCODONE BITARTRATE AND ACETAMINOPHEN 5; 325 MG/1; MG/1
1 TABLET ORAL
Status: COMPLETED | OUTPATIENT
Start: 2024-08-26 | End: 2024-08-26

## 2024-08-26 RX ADMIN — HYDROCODONE BITARTRATE AND ACETAMINOPHEN 1 TABLET: 5; 325 TABLET ORAL at 12:08

## 2024-08-26 NOTE — ED NOTES
I-STAT Chem-8+ Results:   Value Reference Range   Sodium 138 136-145 mmol/L   Potassium  4.7 3.5-5.1 mmol/L   Chloride 103  mmol/L   Ionized Calcium 1.16 1.06-1.42 mmol/L   CO2 (measured) 27 23-29 mmol/L   Glucose 109  mg/dL   BUN 42 6-30 mg/dL   Creatinine 1.6 0.5-1.4 mg/dL   Hematocrit 39 36-54%

## 2024-08-26 NOTE — ED NOTES
Pt became upset while waiting for Dr. Charge Nurse notified explained to pt that there was an emergency and the DrMarcus Would be there as soon as she could.

## 2024-08-26 NOTE — ED NOTES
Received report from Olga STEWARD. Assumed care of patient. Patient is alert and resting comfortably in bed in NAD. BP, cardiac, and O2 monitoring continued.Patient updated on plan of care, pt denies any needs or complaints at this time. Bed locked in lowest position, side rails up x2, call light within reach. VSS.      Lisa Montes

## 2024-08-26 NOTE — DISCHARGE INSTRUCTIONS
I sent pain medication to your pharmacy.  You will need to follow up with Orthopedic surgery for your ankle sprain and General surgery for your sternal fracture.    I have prescribed Norco, an opiate pain medication for severe pain. Do not drive, drink alcohol, take other sedating medications while taking opiate pain medication. It is also constipating, so please take a  stool softener while taking this medication.

## 2024-08-30 ENCOUNTER — OFFICE VISIT (OUTPATIENT)
Dept: PODIATRY | Facility: CLINIC | Age: 69
End: 2024-08-30
Payer: MEDICARE

## 2024-08-30 VITALS
HEART RATE: 74 BPM | BODY MASS INDEX: 27.08 KG/M2 | HEIGHT: 72 IN | SYSTOLIC BLOOD PRESSURE: 110 MMHG | WEIGHT: 199.94 LBS | DIASTOLIC BLOOD PRESSURE: 69 MMHG

## 2024-08-30 DIAGNOSIS — S93.601A SPRAIN OF RIGHT FOOT, INITIAL ENCOUNTER: Primary | ICD-10-CM

## 2024-08-30 DIAGNOSIS — S90.31XA CONTUSION OF RIGHT FOOT, INITIAL ENCOUNTER: ICD-10-CM

## 2024-08-30 DIAGNOSIS — E11.51 TYPE II DIABETES MELLITUS WITH PERIPHERAL CIRCULATORY DISORDER: ICD-10-CM

## 2024-08-30 DIAGNOSIS — L97.519 ULCER OF RIGHT FOOT, UNSPECIFIED ULCER STAGE: ICD-10-CM

## 2024-08-30 PROCEDURE — 99999 PR PBB SHADOW E&M-EST. PATIENT-LVL V: CPT | Mod: PBBFAC,,, | Performed by: PODIATRIST

## 2024-08-30 NOTE — PROGRESS NOTES
Subjective:      Patient ID: Tej Purdy is a 68 y.o. male.    Chief Complaint: Foot Pain (Right foot pain. Hospital follow up.  Most recent foot/ankle xrays done on 08/25/24 sees Dr. Gray for wound care)    Tej Purdy is a 68 y.o. male with  has a past medical history of Acute appendicitis, Aortic stenosis, Atrial fibrillation, Calculus of ureter, CHF (congestive heart failure), Cholangiocarcinoma, Cirrhosis, Diabetes mellitus, type 2, HTN (hypertension), Hyperlipidemia, Kidney stones, PAD (peripheral artery disease), and Skin cancer. Presents for RIGHT foot pain.   He reported an injury about three weeks ago where he bent his foot underneath his ankle.  He did not go to the ER until 8/25/2024.   Xrays unremarkable.   His foot is red and stiff at the ankle.   He reports severe pain, wakes him up at night.    He was prescribed Norco 5-325mg (8/26/2024), 12 tabs.    Doesn't seem to help with pain much.   He is wearing slippers and using a cane.   He does have history of wounds on his foot.           Hemoglobin A1C   Date Value Ref Range Status   01/26/2024 7.5 (H) 4.0 - 5.6 % Final     Comment:     ADA Screening Guidelines:  5.7-6.4%  Consistent with prediabetes  >or=6.5%  Consistent with diabetes    High levels of fetal hemoglobin interfere with the HbA1C  assay. Heterozygous hemoglobin variants (HbS, HgC, etc)do  not significantly interfere with this assay.   However, presence of multiple variants may affect accuracy.     12/23/2023 6.5 (H) 4.0 - 5.6 % Final     Comment:     ADA Screening Guidelines:  5.7-6.4%  Consistent with prediabetes  >or=6.5%  Consistent with diabetes    High levels of fetal hemoglobin interfere with the HbA1C  assay. Heterozygous hemoglobin variants (HbS, HgC, etc)do  not significantly interfere with this assay.   However, presence of multiple variants may affect accuracy.     06/01/2023 6.2 (H) 4.0 - 5.6 % Final     Comment:     ADA Screening Guidelines:  5.7-6.4%  Consistent  with prediabetes  >or=6.5%  Consistent with diabetes    High levels of fetal hemoglobin interfere with the HbA1C  assay. Heterozygous hemoglobin variants (HbS, HgC, etc)do  not significantly interfere with this assay.   However, presence of multiple variants may affect accuracy.             Patient Active Problem List   Diagnosis    Hyperlipidemia    Primary hypertension    Skin cancer    Kidney stone    Diabetic neuropathy    PAD (peripheral artery disease)    Abdominal pain    Type 2 diabetes mellitus, with long-term current use of insulin    Coronary artery disease involving native coronary artery of native heart without angina pectoris    Persistent atrial fibrillation    History of cardioversion    Current use of anticoagulant therapy    S/P liver transplant    At risk for opportunistic infections    Prophylactic immunotherapy    Anemia of chronic disease    Long-term use of immunosuppressant medication    Weakness    Type 2 diabetes mellitus with hyperglycemia    Anemia due to unknown mechanism    Fatigue    Other ascites    Cholangiocarcinoma    Aortic stenosis    Unstable angina    Presence of drug-eluting stent in left circumflex coronary artery    Orthostatic dizziness    Nephrolithiasis    Acute on chronic heart failure with reduced ejection fraction (HFrEF)    History of liver transplant    Iron deficiency anemia    Immunodeficiency due to drugs    S/P TAVR (transcatheter aortic valve replacement)    CHF (congestive heart failure)       Current Outpatient Medications on File Prior to Visit   Medication Sig Dispense Refill    apixaban (ELIQUIS) 5 mg Tab Take 1 tablet (5 mg total) by mouth 2 (two) times daily. 180 tablet 3    atorvastatin (LIPITOR) 80 MG tablet Take 1 tablet (80 mg total) by mouth once daily. 90 tablet 3    blood sugar diagnostic Strp To check BG 2 times daily, to use with insurance preferred meter (patient has a [employer] self-monitoring glucose meter) 100 strip 11    blood-glucose meter  "kit To check BG 2 times daily, to use with insurance preferred meter 1 each 0    buPROPion (WELLBUTRIN XL) 150 MG TB24 tablet Take 1 tablet (150 mg total) by mouth every morning. 90 tablet 1    carvediloL (COREG) 12.5 MG tablet Take 1 tablet (12.5 mg total) by mouth 2 (two) times daily with meals. 180 tablet 3    clopidogreL (PLAVIX) 75 mg tablet Take 1 tablet (75 mg total) by mouth once daily. 90 tablet 3    doxycycline (VIBRA-TABS) 100 MG tablet Take 1 tablet (100 mg total) by mouth 2 (two) times daily. 20 tablet 0    empagliflozin (JARDIANCE) 10 mg tablet Take 1 tablet (10 mg total) by mouth once daily. 90 tablet 3    ferrous sulfate (IRON) 325 mg (65 mg iron) Tab tablet Take 1 tablet (325 mg total) by mouth once daily. 30 tablet 2    furosemide (LASIX) 20 MG tablet Take 1 tablet (20 mg total) by mouth once daily. 90 tablet 3    insulin glargine U-100, Lantus, (LANTUS SOLOSTAR U-100 INSULIN) 100 unit/mL (3 mL) InPn pen Inject 22 Units into the skin every evening. 15 mL 11    insulin lispro (HUMALOG KWIKPEN INSULIN) 100 unit/mL pen Inject 6 Units into the skin 3 (three) times daily with meals. Plus sliding scale, MDD: 48 units 15 mL 5    lancets Misc To check BG 2 times daily, to use with insurance preferred meter 100 each 11    magnesium oxide (MAG-OX) 400 mg (241.3 mg magnesium) tablet Take 1 tablet (400 mg total) by mouth 2 (two) times daily. 60 tablet 11    multivitamin capsule Take 1 capsule by mouth once daily.      omega-3 fatty acids/fish oil (FISH OIL-OMEGA-3 FATTY ACIDS) 300-1,000 mg capsule Take 1 capsule by mouth once daily.       pantoprazole (PROTONIX) 40 MG tablet Take 1 tablet (40 mg total) by mouth once daily. 90 tablet 1    pen needle, diabetic (BD ULTRA-FINE GÉNESIS PEN NEEDLE) 32 gauge x 5/32" Ndle Use to inject insulin 4 times daily 200 each 11    QUEtiapine (SEROQUEL) 100 MG Tab Take 1 tablet (100 mg total) by mouth every evening. 90 tablet 0    sacubitriL-valsartan (ENTRESTO) 49-51 mg per " tablet Take 1 tablet by mouth 2 (two) times daily. 180 tablet 3    tacrolimus (PROGRAF) 0.5 MG Cap Take 1 capsule (0.5 mg total) by mouth once daily. 60 capsule 11    [] HYDROcodone-acetaminophen (NORCO) 5-325 mg per tablet Take 1 tablet by mouth every 6 (six) hours as needed for Pain. 12 tablet 0     No current facility-administered medications on file prior to visit.       Review of patient's allergies indicates:   Allergen Reactions    Bee pollens Swelling     BEE STINGS swells body     Hemoglobin A1C   Date Value Ref Range Status   2024 7.5 (H) 4.0 - 5.6 % Final     Comment:     ADA Screening Guidelines:  5.7-6.4%  Consistent with prediabetes  >or=6.5%  Consistent with diabetes    High levels of fetal hemoglobin interfere with the HbA1C  assay. Heterozygous hemoglobin variants (HbS, HgC, etc)do  not significantly interfere with this assay.   However, presence of multiple variants may affect accuracy.     2023 6.5 (H) 4.0 - 5.6 % Final     Comment:     ADA Screening Guidelines:  5.7-6.4%  Consistent with prediabetes  >or=6.5%  Consistent with diabetes    High levels of fetal hemoglobin interfere with the HbA1C  assay. Heterozygous hemoglobin variants (HbS, HgC, etc)do  not significantly interfere with this assay.   However, presence of multiple variants may affect accuracy.     2023 6.2 (H) 4.0 - 5.6 % Final     Comment:     ADA Screening Guidelines:  5.7-6.4%  Consistent with prediabetes  >or=6.5%  Consistent with diabetes    High levels of fetal hemoglobin interfere with the HbA1C  assay. Heterozygous hemoglobin variants (HbS, HgC, etc)do  not significantly interfere with this assay.   However, presence of multiple variants may affect accuracy.              Objective:      Vitals:    24 1145   BP: 110/69   Pulse: 74   Weight: 90.7 kg (199 lb 15.3 oz)   Height: 6' (1.829 m)   PainSc:   6   PainLoc: Foot     Physical Exam  Vitals and nursing note reviewed.     Constitutional:        General: He is not in acute distress.     Appearance: He is well-developed. He is not toxic-appearing or diaphoretic.      Comments: alert and oriented x 3.      Cardiovascular:      Comments: Dorsalis pedis and posterior tibial pulses are diminished Bilaterally. Toes are cool to touch. Feet are warm proximally.There is decreased digital hair. Skin is atrophic      Musculoskeletal:         General: No deformity.      Right ankle: No tenderness. No lateral malleolus, medial malleolus, AITF ligament, CF ligament or posterior TF ligament tenderness.      Right Achilles Tendon: No defects. Sanchez's test negative.      Left ankle: No tenderness. No lateral malleolus, medial malleolus, AITF ligament, CF ligament or posterior TF ligament tenderness.      Left Achilles Tendon: No defects. Sanchez's test negative.      Right foot:   limited range of motion with tenderness.      Left foot: No tenderness or bony tenderness.      Comments:       Skin:     General: Skin is cool and dry.      Coloration: Skin is not pale.      Findings:   Three well-circumscribed areas of eschar at the 2-4 metatarsal head area (where foot folded under),  the RIGHT foot is red and mildly swollen.  Diffuse bruising noted.      Nails: There is no clubbing.     Neurological:      Sensory: Sensory deficit present.      Motor: No atrophy.     +LOPS         Assessment:       Problem List Items Addressed This Visit    None  Visit Diagnoses       Sprain of right foot, initial encounter    -  Primary    Relevant Orders    AIR CAST WALKER BOOT FOR HOME USE    MRI Midfoot WO Contrast RT    Type II diabetes mellitus with peripheral circulatory disorder        Relevant Orders    US Lower Extremity Arteries Right    MRI Midfoot WO Contrast RT    Contusion of right foot, initial encounter        Ulcer of right foot, unspecified ulcer stage                    Plan:       I counseled the patient on his conditions, their implications and medical  management.    Xrays reviewed.  MRI further evaluation.  He can not do the MRI to evaluate for soft tissue damage, unless it is an open MRI.     The foot appears ischemic.   US arteries also ordered to assess circulation.     Short CAM boot in the meantime to help offload foot to reduce pain.     Advised to rest the foot as much as possible and imaging will give more details about extent of injury.       He does have pain and requests refill of what he was prescribed in the ER..  Since he reports Norco didn't seem to help much, I suggested that he continues to rest and offload the foot and close follow up in a week to review imaging and further steps.   OTC Advil or Tylenol as needed for pain.   May consider topical NSAIDs.   He was upset that I declined refilling narcotic prescription at this time as I'm concerned about medication impairing his driving and ambulation as gait is already unstable.   He states he will not return

## 2024-09-02 DIAGNOSIS — Z76.82 AWAITING LIVER TRANSPLANT: ICD-10-CM

## 2024-09-02 DIAGNOSIS — K76.6 PORTAL HYPERTENSION: ICD-10-CM

## 2024-09-02 DIAGNOSIS — K74.69 OTHER CIRRHOSIS OF LIVER: ICD-10-CM

## 2024-09-02 NOTE — TELEPHONE ENCOUNTER
No care due was identified.  [facility] Embedded Care Due Messages. Reference number: [id number].   9/02/2024 12:51:16 AM CDT

## 2024-09-04 RX ORDER — PANTOPRAZOLE SODIUM 40 MG/1
40 TABLET, DELAYED RELEASE ORAL DAILY
Qty: 90 TABLET | Refills: 1 | Status: SHIPPED | OUTPATIENT
Start: 2024-09-04

## 2024-09-05 ENCOUNTER — HOSPITAL ENCOUNTER (OUTPATIENT)
Dept: RADIOLOGY | Facility: HOSPITAL | Age: 69
Discharge: HOME | End: 2024-09-05
Attending: PHYSICIAN ASSISTANT
Payer: MEDICARE

## 2024-09-05 ENCOUNTER — HOSPITAL ENCOUNTER (OUTPATIENT)
Dept: RADIOLOGY | Facility: HOSPITAL | Age: 69
Discharge: HOME | End: 2024-09-05
Attending: PODIATRIST
Payer: MEDICARE

## 2024-09-05 ENCOUNTER — OFFICE VISIT (OUTPATIENT)
Dept: ORTHOPEDICS | Facility: CLINIC | Age: 69
End: 2024-09-05
Payer: MEDICARE

## 2024-09-05 DIAGNOSIS — M79.671 PAIN IN BOTH FEET: ICD-10-CM

## 2024-09-05 DIAGNOSIS — M79.671 FOOT PAIN, RIGHT: Primary | ICD-10-CM

## 2024-09-05 DIAGNOSIS — M79.671 FOOT PAIN, RIGHT: ICD-10-CM

## 2024-09-05 DIAGNOSIS — S93.401A SPRAIN OF RIGHT ANKLE, UNSPECIFIED LIGAMENT, INITIAL ENCOUNTER: ICD-10-CM

## 2024-09-05 DIAGNOSIS — M79.672 PAIN IN BOTH FEET: ICD-10-CM

## 2024-09-05 DIAGNOSIS — I73.9 PAD (PERIPHERAL ARTERY DISEASE): ICD-10-CM

## 2024-09-05 DIAGNOSIS — S93.601A SPRAIN OF RIGHT FOOT, INITIAL ENCOUNTER: ICD-10-CM

## 2024-09-05 DIAGNOSIS — E11.51 TYPE II DIABETES MELLITUS WITH PERIPHERAL CIRCULATORY DISORDER: ICD-10-CM

## 2024-09-05 DIAGNOSIS — M79.89 RIGHT LEG SWELLING: ICD-10-CM

## 2024-09-05 PROCEDURE — 73610 X-RAY EXAM OF ANKLE: CPT | Mod: TC,RT

## 2024-09-05 PROCEDURE — 99214 OFFICE O/P EST MOD 30 MIN: CPT | Mod: S$GLB,,, | Performed by: PHYSICIAN ASSISTANT

## 2024-09-05 PROCEDURE — 73630 X-RAY EXAM OF FOOT: CPT | Mod: 26,RT,, | Performed by: RADIOLOGY

## 2024-09-05 PROCEDURE — 3051F HG A1C>EQUAL 7.0%<8.0%: CPT | Mod: CPTII,S$GLB,, | Performed by: PHYSICIAN ASSISTANT

## 2024-09-05 PROCEDURE — 1159F MED LIST DOCD IN RCRD: CPT | Mod: CPTII,S$GLB,, | Performed by: PHYSICIAN ASSISTANT

## 2024-09-05 PROCEDURE — 1160F RVW MEDS BY RX/DR IN RCRD: CPT | Mod: CPTII,S$GLB,, | Performed by: PHYSICIAN ASSISTANT

## 2024-09-05 PROCEDURE — 73610 X-RAY EXAM OF ANKLE: CPT | Mod: 26,RT,, | Performed by: RADIOLOGY

## 2024-09-05 PROCEDURE — 73718 MRI LOWER EXTREMITY W/O DYE: CPT | Mod: TC,RT

## 2024-09-05 PROCEDURE — 73630 X-RAY EXAM OF FOOT: CPT | Mod: TC,RT

## 2024-09-05 PROCEDURE — 4010F ACE/ARB THERAPY RXD/TAKEN: CPT | Mod: CPTII,S$GLB,, | Performed by: PHYSICIAN ASSISTANT

## 2024-09-05 PROCEDURE — 99999 PR PBB SHADOW E&M-EST. PATIENT-LVL IV: CPT | Mod: PBBFAC,,, | Performed by: PHYSICIAN ASSISTANT

## 2024-09-05 RX ORDER — HYDROCODONE BITARTRATE AND ACETAMINOPHEN 5; 325 MG/1; MG/1
1 TABLET ORAL EVERY 8 HOURS PRN
Qty: 15 TABLET | Refills: 0 | Status: SHIPPED | OUTPATIENT
Start: 2024-09-05

## 2024-09-05 RX ORDER — DIAZEPAM 5 MG/1
5 TABLET ORAL
Qty: 2 TABLET | Refills: 0 | Status: SHIPPED | OUTPATIENT
Start: 2024-09-05

## 2024-09-05 NOTE — PROGRESS NOTES
SUBJECTIVE:     Chief Complaint & History of Present Illness:    Tej Purdy is a 68 y.o. year old male here with complaints of severe right foot pain following an injury one month ago.  The pain is most significant in the midfoot, with some radiation into the distal tibia. He states the injury occurred when he fell and his foot hyperflexed underneath him.  He is able to bear weight.  He has swelling, ecchymosis and erythema. He denies fever or chills.  There is not a history of previous injury or surgery to the foot.   He is here in a wheelchair today  He saw podiatry last week- scheduled for MRI but not yet completed  He did no go for ultrasound - states he is concerned due to severe hypersensitivity of leg.  Took norco from ED but has run out of this.    Review of patient's allergies indicates:   Allergen Reactions    Bee pollens Swelling     BEE STINGS swells body         Current Outpatient Medications   Medication Sig Dispense Refill    apixaban (ELIQUIS) 5 mg Tab Take 1 tablet (5 mg total) by mouth 2 (two) times daily. 180 tablet 3    atorvastatin (LIPITOR) 80 MG tablet Take 1 tablet (80 mg total) by mouth once daily. 90 tablet 3    blood sugar diagnostic Strp To check BG 2 times daily, to use with insurance preferred meter (patient has a [employer] self-monitoring glucose meter) 100 strip 11    blood-glucose meter kit To check BG 2 times daily, to use with insurance preferred meter 1 each 0    buPROPion (WELLBUTRIN XL) 150 MG TB24 tablet Take 1 tablet (150 mg total) by mouth every morning. 90 tablet 1    carvediloL (COREG) 12.5 MG tablet Take 1 tablet (12.5 mg total) by mouth 2 (two) times daily with meals. 180 tablet 3    clopidogreL (PLAVIX) 75 mg tablet Take 1 tablet (75 mg total) by mouth once daily. 90 tablet 3    diazePAM (VALIUM) 5 MG tablet Take 1 tablet (5 mg total) by mouth as needed for Anxiety. 2 tablet 0    doxycycline (VIBRA-TABS) 100 MG tablet Take 1 tablet (100 mg total) by mouth 2 (two)  "times daily. 20 tablet 0    empagliflozin (JARDIANCE) 10 mg tablet Take 1 tablet (10 mg total) by mouth once daily. 90 tablet 3    ferrous sulfate (IRON) 325 mg (65 mg iron) Tab tablet Take 1 tablet (325 mg total) by mouth once daily. 30 tablet 2    furosemide (LASIX) 20 MG tablet Take 1 tablet (20 mg total) by mouth once daily. 90 tablet 3    HYDROcodone-acetaminophen (NORCO) 5-325 mg per tablet Take 1 tablet by mouth every 8 (eight) hours as needed for Pain. 15 tablet 0    insulin glargine U-100, Lantus, (LANTUS SOLOSTAR U-100 INSULIN) 100 unit/mL (3 mL) InPn pen Inject 22 Units into the skin every evening. 15 mL 11    insulin lispro (HUMALOG KWIKPEN INSULIN) 100 unit/mL pen Inject 6 Units into the skin 3 (three) times daily with meals. Plus sliding scale, MDD: 48 units 15 mL 5    lancets Misc To check BG 2 times daily, to use with insurance preferred meter 100 each 11    magnesium oxide (MAG-OX) 400 mg (241.3 mg magnesium) tablet Take 1 tablet (400 mg total) by mouth 2 (two) times daily. 60 tablet 11    multivitamin capsule Take 1 capsule by mouth once daily.      omega-3 fatty acids/fish oil (FISH OIL-OMEGA-3 FATTY ACIDS) 300-1,000 mg capsule Take 1 capsule by mouth once daily.       pantoprazole (PROTONIX) 40 MG tablet Take 1 tablet (40 mg total) by mouth once daily. 90 tablet 1    pen needle, diabetic (BD ULTRA-FINE GÉNESIS PEN NEEDLE) 32 gauge x 5/32" Ndle Use to inject insulin 4 times daily 200 each 11    QUEtiapine (SEROQUEL) 100 MG Tab Take 1 tablet (100 mg total) by mouth every evening. 90 tablet 0    sacubitriL-valsartan (ENTRESTO) 49-51 mg per tablet Take 1 tablet by mouth 2 (two) times daily. 180 tablet 3    tacrolimus (PROGRAF) 0.5 MG Cap Take 1 capsule (0.5 mg total) by mouth once daily. 60 capsule 11     No current facility-administered medications for this visit.       Past Medical History:   Diagnosis Date    Acute appendicitis 06/02/2023    Aortic stenosis     Atrial fibrillation     " johny/cardioversion on 1/26/24    Calculus of ureter 12/22/2023    CHF (congestive heart failure)     Cholangiocarcinoma 03/16/2022    s/p liver transplant    Cirrhosis 11/23/2020    s/p transplant 2022 for CASTILLO cirrhosis and cholangiocarcinoma    Diabetes mellitus, type 2     c/b Diabetic neuropathy    HTN (hypertension) 11/23/2020    Hyperlipidemia 11/23/2020    Kidney stones 11/23/2020    S/p lithotripsy 2020    PAD (peripheral artery disease)     Skin cancer 11/23/2020       Past Surgical History:   Procedure Laterality Date    ANGIOGRAM, CORONARY, WITH LEFT HEART CATHETERIZATION N/A 12/26/2023    Procedure: Angiogram, Coronary, with Left Heart Cath;  Surgeon: Carlos King MD;  Location: [facility] CATH LAB;  Service: Cardiology;  Laterality: N/A;    AORTIC VALVULOPLASTY N/A 2/1/2024    Procedure: REPAIR, AORTIC VALVE;  Surgeon: Shilo Carrasco MD;  Location: [facility] CATH LAB;  Service: Cardiology;  Laterality: N/A;    COLONOSCOPY  10/2020    ECHOCARDIOGRAM,TRANSESOPHAGEAL N/A 1/26/2024    Procedure: Transesophageal echo (JOHNY) intra-procedure log documentation;  Surgeon: Nick Mathur III, MD;  Location: [facility] EP LAB;  Service: Cardiology;  Laterality: N/A;    ESOPHAGEAL MANOMETRY WITH MEASUREMENT OF IMPEDANCE N/A 7/17/2023    Procedure: MANOMETRY, ESOPHAGUS, WITH IMPEDANCE MEASUREMENT;  Surgeon: Klarissa Zamora MD;  Location: [facility] ENDO (4TH FLR);  Service: Gastroenterology;  Laterality: N/A;  pt diabetic  liver txp  prep insructions sent to pt via email and portal -    ESOPHAGOGASTRODUODENOSCOPY  10/2020    ESOPHAGOGASTRODUODENOSCOPY N/A 7/1/2021    Procedure: EGD (ESOPHAGOGASTRODUODENOSCOPY);  Surgeon: Jovan David MD;  Location: [facility] ENDO (4TH FLR);  Service: Endoscopy;  Laterality: N/A;  HCC. Listed for liver transplant. EGD for variceal surveillance.  cardiac clearance and blood thinenr approval received, see telephone encounter 6/23/21-BB  fully vaccinated-BB  labs same day of procedure-BB     EXCISION OF HYDROCELE Right     hemorroid surgery      hemorroidectomy      KIDNEY STONE SURGERY      LAPAROSCOPIC APPENDECTOMY N/A 6/2/2023    Procedure: APPENDECTOMY, LAPAROSCOPIC;  Surgeon: Shan Ross MD;  [facility];  Service: General;  Laterality: N/A;    LEFT HEART CATHETERIZATION N/A 1/27/2021    Procedure: Left heart cath;  Surgeon: Kris Shelton MD;  Location: [facility] CATH LAB;  Service: Cardiology;  Laterality: N/A;    liver mass removal      LIVER TRANSPLANT N/A 1/6/2022    Procedure: TRANSPLANT, LIVER;  Surgeon: Lizet Garcia MD;  [facility];  Service: Transplant;  Laterality: N/A;    RIGHT HEART CATHETERIZATION Right 5/7/2021    Procedure: INSERTION, CATHETER, RIGHT HEART;  Surgeon: Jaden Schuster MD;  Location: [facility] CATH LAB;  Service: Cardiology;  Laterality: Right;    STENT, DRUG ELUTING, SINGLE VESSEL, CORONARY  12/26/2023    Procedure: Stent, Drug Eluting, Single Vessel, Coronary;  Surgeon: Carlos King MD;  Location: [facility] CATH LAB;  Service: Cardiology;;    TREATMENT OF CARDIAC ARRHYTHMIA N/A 5/19/2021    Procedure: CARDIOVERSION;  Surgeon: Didier Tilley MD;  Location: [facility] EP LAB;  Service: Cardiology;  Laterality: N/A;  a fib, dccv/johny, mac, dm. sscu    TREATMENT OF CARDIAC ARRHYTHMIA N/A 5/31/2021    Procedure: CARDIOVERSION;  Surgeon: Daniel Arambula MD;  Location: [facility] EP LAB;  Service: Cardiology;  Laterality: N/A;  afib, DCCV, anest., DM, 3 prep    TREATMENT OF CARDIAC ARRHYTHMIA N/A 7/7/2021    Procedure: Cardioversion or Defibrillation;  Surgeon: Didier Tilley MD;  Location: [facility] EP LAB;  Service: Cardiology;  Laterality: N/A;  AF, JOHNY (cancel if complaint), DCCV, MAC, DM, 3 Prep    TREATMENT OF CARDIAC ARRHYTHMIA N/A 7/27/2021    Procedure: Cardioversion or Defibrillation;  Surgeon: Didier Tilley MD;  Location: [facility] EP LAB;  Service: Cardiology;  Laterality: N/A;  AF, JOHNY (cx if complaint), DCCV, MAC, DM, 3 Prep    TREATMENT OF  CARDIAC ARRHYTHMIA N/A 1/26/2024    Procedure: Cardioversion or Defibrillation;  Surgeon: Koko Knight MD;  Location: [facility] LAB;  Service: Cardiology;  Laterality: N/A;  AF, ASHISH, DCCV, MAC, DM, 3 Prep *ADENOSINE TEST* *LIVER TRANSPLANT PATIENT*         Review of Systems:  ROS:  Constitutional: no fever or chills  Eyes: no visual changes  ENT: no nasal congestion or sore throat  Respiratory: no cough or shortness of breath  Musculoskeletal: no arthralgias or myalgias  Behavioral/Psych: no auditory or visual hallucinations    OBJECTIVE:     PHYSICAL EXAM:  General Appearance: Well nourished, well developed, in no acute distress.  CV: 2+ UE and LE distal pulses bilaterally  RESP: Respirations equal and unlabored  GI: Abdomen soft and non-tender  Neurological: Mood & affect are normal.  Right lower leg  There is diffuse swelling of the foot  Unable to check pulses or palpate due to extreme sensitivity  He is able to move his ankle and toes  There is normal capillary refill  Negative homans     IMAGING:  X-rays of the right foot and ankle, personally reviewed by me, demonstrate no acute abnormality.  There are vascular calcifications.  No fracture or dislocation.       ASSESSMENT     1. Foot pain, right    2. Sprain of right ankle, unspecified ligament, initial encounter    3. Right leg swelling    4. PAD (peripheral artery disease)        PLAN:     - Severe right foot pain worsening over the past month.  MRI moved up to this evening  - Scheduled arterial ultrasound for tomorrow  - Will call to discuss MRI and further plan  - Valium for MRI

## 2024-09-06 ENCOUNTER — HOSPITAL ENCOUNTER (OUTPATIENT)
Dept: CARDIOLOGY | Facility: HOSPITAL | Age: 69
Discharge: HOME | End: 2024-09-06
Attending: PHYSICIAN ASSISTANT
Payer: MEDICARE

## 2024-09-06 DIAGNOSIS — M79.671 FOOT PAIN, RIGHT: ICD-10-CM

## 2024-09-06 LAB
RIGHT ANT TIBIAL SYS PSV: 38 CM/S
RIGHT CFA PSV: 157 CM/S
RIGHT EXTERNAL ILLIAC PSV: 124 CM/S
RIGHT PERONEAL SYS PSV: 16 CM/S
RIGHT POPLITEAL PSV: 29 CM/S
RIGHT POST TIBIAL SYS PSV: 32 CM/S
RIGHT PROFUNDA SYS PSV: 146 CM/S
RIGHT SUPER FEMORAL DIST SYS PSV: 38 CM/S
RIGHT SUPER FEMORAL MID SYS PSV: 60 CM/S
RIGHT SUPER FEMORAL OSTIAL SYS PSV: 97 CM/S
RIGHT SUPER FEMORAL PROX SYS PSV: 113 CM/S
RIGHT TIB/PER TRUNK SYS PSV: 36 CM/S

## 2024-09-06 PROCEDURE — 93926 LOWER EXTREMITY STUDY: CPT | Mod: 26,RT,, | Performed by: INTERNAL MEDICINE

## 2024-09-06 PROCEDURE — 93926 LOWER EXTREMITY STUDY: CPT | Mod: RT

## 2024-09-09 ENCOUNTER — TELEPHONE (OUTPATIENT)
Dept: ORTHOPEDICS | Facility: CLINIC | Age: 69
End: 2024-09-09
Payer: MEDICARE

## 2024-09-09 ENCOUNTER — PATIENT MESSAGE (OUTPATIENT)
Dept: ORTHOPEDICS | Facility: CLINIC | Age: 69
End: 2024-09-09
Payer: MEDICARE

## 2024-09-09 DIAGNOSIS — I83.891 VARICOSE VEINS OF LEG WITH SWELLING, RIGHT: ICD-10-CM

## 2024-09-09 DIAGNOSIS — M79.671 FOOT PAIN, RIGHT: ICD-10-CM

## 2024-09-09 DIAGNOSIS — F32.A DEPRESSION, UNSPECIFIED DEPRESSION TYPE: ICD-10-CM

## 2024-09-09 DIAGNOSIS — S92.051A CLOSED EXTRA-ARTICULAR FRACTURE OF CALCANEUS, RIGHT, INITIAL ENCOUNTER: Primary | ICD-10-CM

## 2024-09-09 RX ORDER — PREGABALIN 75 MG/1
75 CAPSULE ORAL 2 TIMES DAILY
Qty: 60 CAPSULE | Refills: 0 | Status: SHIPPED | OUTPATIENT
Start: 2024-09-09 | End: 2024-10-09

## 2024-09-09 NOTE — TELEPHONE ENCOUNTER
Called patient to discuss MRI  Still have severe pain, hypersensitivity  Arterial u/s- no need for any urgent vascular intervention  Suspect possible CRPS- will refer to pain management  Lyrica prescripton  U/S to rule out DVT  Protected weightbearing with boot- state he is unable to tolerate wearing

## 2024-09-09 NOTE — TELEPHONE ENCOUNTER
No care due was identified.  [facility] Embedded Care Due Messages. Reference number: [id number].   9/09/2024 4:29:29 PM CDT

## 2024-09-10 ENCOUNTER — HOSPITAL ENCOUNTER (OUTPATIENT)
Dept: RADIOLOGY | Facility: OTHER | Age: 69
Discharge: HOME | End: 2024-09-10
Attending: PHYSICIAN ASSISTANT
Payer: MEDICARE

## 2024-09-10 DIAGNOSIS — I83.891 VARICOSE VEINS OF LEG WITH SWELLING, RIGHT: ICD-10-CM

## 2024-09-10 DIAGNOSIS — M79.671 FOOT PAIN, RIGHT: ICD-10-CM

## 2024-09-10 DIAGNOSIS — S92.051A CLOSED EXTRA-ARTICULAR FRACTURE OF CALCANEUS, RIGHT, INITIAL ENCOUNTER: ICD-10-CM

## 2024-09-10 PROCEDURE — 93971 EXTREMITY STUDY: CPT | Mod: 26,RT,, | Performed by: RADIOLOGY

## 2024-09-10 PROCEDURE — 93971 EXTREMITY STUDY: CPT | Mod: TC,RT

## 2024-09-10 RX ORDER — BUPROPION HYDROCHLORIDE 150 MG/1
150 TABLET ORAL EVERY MORNING
Qty: 30 TABLET | Refills: 0 | Status: SHIPPED | OUTPATIENT
Start: 2024-09-10

## 2024-09-10 NOTE — TELEPHONE ENCOUNTER
LOV 2/14/24  Annual   RTC 9/16/24    Patient is requesting a refill for bupropion.   Spoke to the patient and scheduled an appointment for an annual visit.

## 2024-09-12 ENCOUNTER — TELEPHONE (OUTPATIENT)
Dept: CARDIOTHORACIC SURGERY | Facility: CLINIC | Age: 69
End: 2024-09-12
Payer: MEDICARE

## 2024-09-12 DIAGNOSIS — E78.5 HYPERLIPIDEMIA, UNSPECIFIED HYPERLIPIDEMIA TYPE: Primary | ICD-10-CM

## 2024-09-12 NOTE — TELEPHONE ENCOUNTER
Called pt to schedule appt with one of the Thoracic surgeons,also informed him they don't do anything to treat sternum fracture but he could still come in, He understood and decline appt.

## 2024-09-13 ENCOUNTER — TELEPHONE (OUTPATIENT)
Dept: SPINE | Facility: CLINIC | Age: 69
End: 2024-09-13
Payer: MEDICARE

## 2024-09-16 ENCOUNTER — OFFICE VISIT (OUTPATIENT)
Dept: PRIMARY CARE CLINIC | Facility: CLINIC | Age: 69
End: 2024-09-16
Payer: MEDICARE

## 2024-09-16 VITALS
RESPIRATION RATE: 18 BRPM | SYSTOLIC BLOOD PRESSURE: 128 MMHG | BODY MASS INDEX: 27.23 KG/M2 | HEART RATE: 76 BPM | DIASTOLIC BLOOD PRESSURE: 78 MMHG | WEIGHT: 201.06 LBS | OXYGEN SATURATION: 99 % | HEIGHT: 72 IN

## 2024-09-16 DIAGNOSIS — E78.2 MIXED HYPERLIPIDEMIA: ICD-10-CM

## 2024-09-16 DIAGNOSIS — I10 PRIMARY HYPERTENSION: Chronic | ICD-10-CM

## 2024-09-16 DIAGNOSIS — R79.9 ABNORMAL FINDING OF BLOOD CHEMISTRY, UNSPECIFIED: ICD-10-CM

## 2024-09-16 DIAGNOSIS — D84.821 IMMUNODEFICIENCY DUE TO DRUGS: ICD-10-CM

## 2024-09-16 DIAGNOSIS — Z92.89 HISTORY OF CARDIOVERSION: ICD-10-CM

## 2024-09-16 DIAGNOSIS — D63.8 ANEMIA OF CHRONIC DISEASE: ICD-10-CM

## 2024-09-16 DIAGNOSIS — Z85.09 HISTORY OF CHOLANGIOCARCINOMA: ICD-10-CM

## 2024-09-16 DIAGNOSIS — Z94.4 HISTORY OF LIVER TRANSPLANT: ICD-10-CM

## 2024-09-16 DIAGNOSIS — F32.A DEPRESSION, UNSPECIFIED DEPRESSION TYPE: ICD-10-CM

## 2024-09-16 DIAGNOSIS — R68.89 OTHER GENERAL SYMPTOMS AND SIGNS: ICD-10-CM

## 2024-09-16 DIAGNOSIS — Z79.899 IMMUNODEFICIENCY DUE TO DRUGS: ICD-10-CM

## 2024-09-16 DIAGNOSIS — K74.69 OTHER CIRRHOSIS OF LIVER: ICD-10-CM

## 2024-09-16 DIAGNOSIS — I25.10 CORONARY ARTERY DISEASE INVOLVING NATIVE CORONARY ARTERY OF NATIVE HEART WITHOUT ANGINA PECTORIS: Chronic | ICD-10-CM

## 2024-09-16 DIAGNOSIS — D69.6 THROMBOCYTOPENIA, UNSPECIFIED: ICD-10-CM

## 2024-09-16 DIAGNOSIS — Z00.00 ROUTINE MEDICAL EXAM: Primary | ICD-10-CM

## 2024-09-16 DIAGNOSIS — Z95.2 S/P TAVR (TRANSCATHETER AORTIC VALVE REPLACEMENT): ICD-10-CM

## 2024-09-16 DIAGNOSIS — I50.22 CHRONIC HFREF (HEART FAILURE WITH REDUCED EJECTION FRACTION): ICD-10-CM

## 2024-09-16 DIAGNOSIS — Z95.5 PRESENCE OF DRUG-ELUTING STENT IN LEFT CIRCUMFLEX CORONARY ARTERY: ICD-10-CM

## 2024-09-16 DIAGNOSIS — F33.41 RECURRENT MAJOR DEPRESSIVE DISORDER, IN PARTIAL REMISSION: ICD-10-CM

## 2024-09-16 DIAGNOSIS — E11.65 TYPE 2 DIABETES MELLITUS WITH HYPERGLYCEMIA, WITH LONG-TERM CURRENT USE OF INSULIN: ICD-10-CM

## 2024-09-16 DIAGNOSIS — Z79.4 TYPE 2 DIABETES MELLITUS WITH HYPERGLYCEMIA, WITH LONG-TERM CURRENT USE OF INSULIN: ICD-10-CM

## 2024-09-16 DIAGNOSIS — I73.9 PAD (PERIPHERAL ARTERY DISEASE): Chronic | ICD-10-CM

## 2024-09-16 DIAGNOSIS — K76.6 PORTAL HYPERTENSION: ICD-10-CM

## 2024-09-16 DIAGNOSIS — I48.19 PERSISTENT ATRIAL FIBRILLATION: Chronic | ICD-10-CM

## 2024-09-16 PROCEDURE — 3078F DIAST BP <80 MM HG: CPT | Mod: CPTII,S$GLB,, | Performed by: FAMILY MEDICINE

## 2024-09-16 PROCEDURE — 3008F BODY MASS INDEX DOCD: CPT | Mod: CPTII,S$GLB,, | Performed by: FAMILY MEDICINE

## 2024-09-16 PROCEDURE — 3288F FALL RISK ASSESSMENT DOCD: CPT | Mod: CPTII,S$GLB,, | Performed by: FAMILY MEDICINE

## 2024-09-16 PROCEDURE — 99999 PR PBB SHADOW E&M-EST. PATIENT-LVL V: CPT | Mod: PBBFAC,,, | Performed by: FAMILY MEDICINE

## 2024-09-16 PROCEDURE — 1101F PT FALLS ASSESS-DOCD LE1/YR: CPT | Mod: CPTII,S$GLB,, | Performed by: FAMILY MEDICINE

## 2024-09-16 PROCEDURE — 1160F RVW MEDS BY RX/DR IN RCRD: CPT | Mod: CPTII,S$GLB,, | Performed by: FAMILY MEDICINE

## 2024-09-16 PROCEDURE — 3074F SYST BP LT 130 MM HG: CPT | Mod: CPTII,S$GLB,, | Performed by: FAMILY MEDICINE

## 2024-09-16 PROCEDURE — 1159F MED LIST DOCD IN RCRD: CPT | Mod: CPTII,S$GLB,, | Performed by: FAMILY MEDICINE

## 2024-09-16 PROCEDURE — 99397 PER PM REEVAL EST PAT 65+ YR: CPT | Mod: S$GLB,,, | Performed by: FAMILY MEDICINE

## 2024-09-16 PROCEDURE — 1125F AMNT PAIN NOTED PAIN PRSNT: CPT | Mod: CPTII,S$GLB,, | Performed by: FAMILY MEDICINE

## 2024-09-16 PROCEDURE — 4010F ACE/ARB THERAPY RXD/TAKEN: CPT | Mod: CPTII,S$GLB,, | Performed by: FAMILY MEDICINE

## 2024-09-16 PROCEDURE — 3044F HG A1C LEVEL LT 7.0%: CPT | Mod: CPTII,S$GLB,, | Performed by: FAMILY MEDICINE

## 2024-09-16 RX ORDER — QUETIAPINE FUMARATE 100 MG/1
100 TABLET, FILM COATED ORAL NIGHTLY
Qty: 90 TABLET | Refills: 1 | Status: SHIPPED | OUTPATIENT
Start: 2024-09-16

## 2024-09-16 RX ORDER — PANTOPRAZOLE SODIUM 40 MG/1
40 TABLET, DELAYED RELEASE ORAL DAILY
Qty: 90 TABLET | Refills: 1 | Status: SHIPPED | OUTPATIENT
Start: 2024-09-16

## 2024-09-16 RX ORDER — BUPROPION HYDROCHLORIDE 150 MG/1
150 TABLET ORAL EVERY MORNING
Qty: 90 TABLET | Refills: 1 | Status: SHIPPED | OUTPATIENT
Start: 2024-09-16

## 2024-09-18 ENCOUNTER — LAB VISIT (OUTPATIENT)
Dept: LAB | Facility: HOSPITAL | Age: 69
End: 2024-09-18
Attending: FAMILY MEDICINE
Payer: MEDICARE

## 2024-09-18 DIAGNOSIS — C22.1 CHOLANGIOCARCINOMA: ICD-10-CM

## 2024-09-18 DIAGNOSIS — Z94.4 S/P LIVER TRANSPLANT: ICD-10-CM

## 2024-09-18 DIAGNOSIS — R79.9 ABNORMAL FINDING OF BLOOD CHEMISTRY, UNSPECIFIED: ICD-10-CM

## 2024-09-18 DIAGNOSIS — R68.89 OTHER GENERAL SYMPTOMS AND SIGNS: ICD-10-CM

## 2024-09-18 DIAGNOSIS — Z00.00 ROUTINE MEDICAL EXAM: ICD-10-CM

## 2024-09-18 LAB
ALBUMIN SERPL BCP-MCNC: 3.5 G/DL (ref 3.5–5.2)
ALBUMIN SERPL BCP-MCNC: 3.5 G/DL (ref 3.5–5.2)
ALP SERPL-CCNC: 80 U/L (ref 55–135)
ALP SERPL-CCNC: 80 U/L (ref 55–135)
ALT SERPL W/O P-5'-P-CCNC: 16 U/L (ref 10–44)
ALT SERPL W/O P-5'-P-CCNC: 16 U/L (ref 10–44)
ANION GAP SERPL CALC-SCNC: 8 MMOL/L (ref 8–16)
ANION GAP SERPL CALC-SCNC: 8 MMOL/L (ref 8–16)
AST SERPL-CCNC: 23 U/L (ref 10–40)
AST SERPL-CCNC: 23 U/L (ref 10–40)
BASOPHILS # BLD AUTO: 0.05 K/UL (ref 0–0.2)
BASOPHILS NFR BLD: 0.5 % (ref 0–1.9)
BILIRUB SERPL-MCNC: 0.7 MG/DL (ref 0.1–1)
BILIRUB SERPL-MCNC: 0.7 MG/DL (ref 0.1–1)
BUN SERPL-MCNC: 21 MG/DL (ref 8–23)
BUN SERPL-MCNC: 21 MG/DL (ref 8–23)
CALCIUM SERPL-MCNC: 9 MG/DL (ref 8.7–10.5)
CALCIUM SERPL-MCNC: 9 MG/DL (ref 8.7–10.5)
CANCER AG19-9 SERPL-ACNC: <2.1 U/ML (ref 0–40)
CHLORIDE SERPL-SCNC: 106 MMOL/L (ref 95–110)
CHLORIDE SERPL-SCNC: 106 MMOL/L (ref 95–110)
CHOLEST SERPL-MCNC: 89 MG/DL (ref 120–199)
CHOLEST/HDLC SERPL: 3.4 {RATIO} (ref 2–5)
CO2 SERPL-SCNC: 24 MMOL/L (ref 23–29)
CO2 SERPL-SCNC: 24 MMOL/L (ref 23–29)
CREAT SERPL-MCNC: 1.2 MG/DL (ref 0.5–1.4)
CREAT SERPL-MCNC: 1.2 MG/DL (ref 0.5–1.4)
DIFFERENTIAL METHOD BLD: ABNORMAL
EOSINOPHIL # BLD AUTO: 0.4 K/UL (ref 0–0.5)
EOSINOPHIL NFR BLD: 3.7 % (ref 0–8)
ERYTHROCYTE [DISTWIDTH] IN BLOOD BY AUTOMATED COUNT: 14.9 % (ref 11.5–14.5)
ERYTHROCYTE [DISTWIDTH] IN BLOOD BY AUTOMATED COUNT: 14.9 % (ref 11.5–14.5)
EST. GFR  (NO RACE VARIABLE): >60 ML/MIN/1.73 M^2
EST. GFR  (NO RACE VARIABLE): >60 ML/MIN/1.73 M^2
ESTIMATED AVG GLUCOSE: 100 MG/DL (ref 68–131)
GLUCOSE SERPL-MCNC: 76 MG/DL (ref 70–110)
GLUCOSE SERPL-MCNC: 76 MG/DL (ref 70–110)
HBA1C MFR BLD: 5.1 % (ref 4–5.6)
HCT VFR BLD AUTO: 42.1 % (ref 40–54)
HCT VFR BLD AUTO: 42.1 % (ref 40–54)
HDLC SERPL-MCNC: 26 MG/DL (ref 40–75)
HDLC SERPL: 29.2 % (ref 20–50)
HGB BLD-MCNC: 13.3 G/DL (ref 14–18)
HGB BLD-MCNC: 13.3 G/DL (ref 14–18)
IMM GRANULOCYTES # BLD AUTO: 0.03 K/UL (ref 0–0.04)
IMM GRANULOCYTES NFR BLD AUTO: 0.3 % (ref 0–0.5)
LDLC SERPL CALC-MCNC: 36.2 MG/DL (ref 63–159)
LYMPHOCYTES # BLD AUTO: 1.4 K/UL (ref 1–4.8)
LYMPHOCYTES NFR BLD: 14.6 % (ref 18–48)
MAGNESIUM SERPL-MCNC: 2.4 MG/DL (ref 1.6–2.6)
MCH RBC QN AUTO: 32 PG (ref 27–31)
MCH RBC QN AUTO: 32 PG (ref 27–31)
MCHC RBC AUTO-ENTMCNC: 31.6 G/DL (ref 32–36)
MCHC RBC AUTO-ENTMCNC: 31.6 G/DL (ref 32–36)
MCV RBC AUTO: 101 FL (ref 82–98)
MCV RBC AUTO: 101 FL (ref 82–98)
MONOCYTES # BLD AUTO: 0.9 K/UL (ref 0.3–1)
MONOCYTES NFR BLD: 9.4 % (ref 4–15)
NEUTROPHILS # BLD AUTO: 6.7 K/UL (ref 1.8–7.7)
NEUTROPHILS NFR BLD: 71.5 % (ref 38–73)
NONHDLC SERPL-MCNC: 63 MG/DL
NRBC BLD-RTO: 0 /100 WBC
PLATELET # BLD AUTO: 111 K/UL (ref 150–450)
PLATELET # BLD AUTO: 111 K/UL (ref 150–450)
PMV BLD AUTO: 12.5 FL (ref 9.2–12.9)
PMV BLD AUTO: 12.5 FL (ref 9.2–12.9)
POTASSIUM SERPL-SCNC: 4.1 MMOL/L (ref 3.5–5.1)
POTASSIUM SERPL-SCNC: 4.1 MMOL/L (ref 3.5–5.1)
PROT SERPL-MCNC: 6.6 G/DL (ref 6–8.4)
PROT SERPL-MCNC: 6.6 G/DL (ref 6–8.4)
RBC # BLD AUTO: 4.16 M/UL (ref 4.6–6.2)
RBC # BLD AUTO: 4.16 M/UL (ref 4.6–6.2)
SODIUM SERPL-SCNC: 138 MMOL/L (ref 136–145)
SODIUM SERPL-SCNC: 138 MMOL/L (ref 136–145)
TRIGL SERPL-MCNC: 134 MG/DL (ref 30–150)
TSH SERPL DL<=0.005 MIU/L-ACNC: 2.54 UIU/ML (ref 0.4–4)
WBC # BLD AUTO: 9.43 K/UL (ref 3.9–12.7)
WBC # BLD AUTO: 9.43 K/UL (ref 3.9–12.7)

## 2024-09-18 PROCEDURE — 80197 ASSAY OF TACROLIMUS: CPT | Performed by: INTERNAL MEDICINE

## 2024-09-18 PROCEDURE — 83735 ASSAY OF MAGNESIUM: CPT | Performed by: INTERNAL MEDICINE

## 2024-09-18 PROCEDURE — 84443 ASSAY THYROID STIM HORMONE: CPT | Performed by: FAMILY MEDICINE

## 2024-09-18 PROCEDURE — 36415 COLL VENOUS BLD VENIPUNCTURE: CPT | Mod: PN | Performed by: FAMILY MEDICINE

## 2024-09-18 PROCEDURE — 85025 COMPLETE CBC W/AUTO DIFF WBC: CPT | Performed by: INTERNAL MEDICINE

## 2024-09-18 PROCEDURE — 80061 LIPID PANEL: CPT | Performed by: FAMILY MEDICINE

## 2024-09-18 PROCEDURE — 83036 HEMOGLOBIN GLYCOSYLATED A1C: CPT | Performed by: FAMILY MEDICINE

## 2024-09-18 PROCEDURE — 80053 COMPREHEN METABOLIC PANEL: CPT | Performed by: INTERNAL MEDICINE

## 2024-09-18 PROCEDURE — 86301 IMMUNOASSAY TUMOR CA 19-9: CPT | Performed by: INTERNAL MEDICINE

## 2024-09-19 DIAGNOSIS — Z94.4 S/P LIVER TRANSPLANT: ICD-10-CM

## 2024-09-19 LAB — TACROLIMUS BLD-MCNC: <2 NG/ML (ref 5–15)

## 2024-09-19 RX ORDER — TACROLIMUS 0.5 MG/1
CAPSULE ORAL
Qty: 90 CAPSULE | Refills: 11 | Status: ACTIVE | OUTPATIENT
Start: 2024-09-19

## 2024-09-19 NOTE — TELEPHONE ENCOUNTER
Sent message to patient with changes, and to get labs on 9/25/24    ----- Message from Thais Maxwell MD sent at 9/19/2024  9:05 AM CDT -----  Prograf undetectable- increase to 1/0.5 from 0.5 mg daily and repeat labs on monday- please let patient know.

## 2024-09-20 PROBLEM — I50.22 CHRONIC HFREF (HEART FAILURE WITH REDUCED EJECTION FRACTION): Status: ACTIVE | Noted: 2024-01-27

## 2024-09-20 PROBLEM — D69.6 THROMBOCYTOPENIA, UNSPECIFIED: Status: ACTIVE | Noted: 2024-09-20

## 2024-09-20 PROBLEM — E78.2 MIXED HYPERLIPIDEMIA: Status: ACTIVE | Noted: 2020-11-23

## 2024-09-20 PROBLEM — I50.9 CHF (CONGESTIVE HEART FAILURE): Status: RESOLVED | Noted: 2024-05-07 | Resolved: 2024-09-20

## 2024-09-20 PROBLEM — Z85.09 HISTORY OF CHOLANGIOCARCINOMA: Status: ACTIVE | Noted: 2024-09-20

## 2024-09-20 NOTE — PROGRESS NOTES
/78 (BP Location: Left arm, Patient Position: Sitting, BP Method: Medium (Manual))   Pulse 76   Resp 18   Ht 6' (1.829 m)   Wt 91.2 kg (201 lb 1 oz)   SpO2 99%   BMI 27.27 kg/m²       ===========              Tej Purdy is a 68 y.o. male     here for    Annual exam.    Health maintenance reviewed with patient in detail inc any recent labs and studies and needs for future screening labs.  Age-appropriate vaccines and other age-appropriate screening studies reviewed with patient in detail.  Sleep health reviewed with patient.  Skin health regarding possible skin cancer screening reviewed with patient.  General regularity of bowel movements and urinations reviewed with patient including any possibility of urine leakage.  Vision screening reviewed with patient.      Patient queried and denies any further complaints      Patient Active Problem List   Diagnosis    Mixed hyperlipidemia    Primary hypertension    Skin cancer    Kidney stone    Diabetic neuropathy    PAD (peripheral artery disease)    Abdominal pain    Type 2 diabetes mellitus, with long-term current use of insulin    Coronary artery disease involving native coronary artery of native heart without angina pectoris    Persistent atrial fibrillation    History of cardioversion    Current use of anticoagulant therapy    S/P liver transplant    At risk for opportunistic infections    Prophylactic immunotherapy    Anemia of chronic disease    Long-term use of immunosuppressant medication    Weakness    Type 2 diabetes mellitus with hyperglycemia    Anemia due to unknown mechanism    Fatigue    Other ascites    Cholangiocarcinoma    Aortic stenosis    Unstable angina    Presence of drug-eluting stent in left circumflex coronary artery    Orthostatic dizziness    Nephrolithiasis    Chronic HFrEF (heart failure with reduced ejection fraction)    History of liver transplant    Iron deficiency anemia    Immunodeficiency due to drugs    S/P TAVR  (transcatheter aortic valve replacement)    History of cholangiocarcinoma    Thrombocytopenia, unspecified       SURGICAL AND MEDICAL HISTORY: updated and reviewed.  Past Surgical History:   Procedure Laterality Date    ANGIOGRAM, CORONARY, WITH LEFT HEART CATHETERIZATION N/A 12/26/2023    Procedure: Angiogram, Coronary, with Left Heart Cath;  Surgeon: Carlos King MD;  Location: [facility] CATH LAB;  Service: Cardiology;  Laterality: N/A;    AORTIC VALVULOPLASTY N/A 2/1/2024    Procedure: REPAIR, AORTIC VALVE;  Surgeon: Shilo Carrasco MD;  Location: [facility] CATH LAB;  Service: Cardiology;  Laterality: N/A;    COLONOSCOPY  10/2020    ECHOCARDIOGRAM,TRANSESOPHAGEAL N/A 1/26/2024    Procedure: Transesophageal echo (ASHISH) intra-procedure log documentation;  Surgeon: Nick Mathur III, MD;  Location: [facility] EP LAB;  Service: Cardiology;  Laterality: N/A;    ESOPHAGEAL MANOMETRY WITH MEASUREMENT OF IMPEDANCE N/A 7/17/2023    Procedure: MANOMETRY, ESOPHAGUS, WITH IMPEDANCE MEASUREMENT;  Surgeon: Klarissa Zamora MD;  Location: [facility] (4TH FLR);  Service: Gastroenterology;  Laterality: N/A;  pt diabetic  liver txp  prep insructions sent to pt via email and portal -Jm    ESOPHAGOGASTRODUODENOSCOPY  10/2020    ESOPHAGOGASTRODUODENOSCOPY N/A 7/1/2021    Procedure: EGD (ESOPHAGOGASTRODUODENOSCOPY);  Surgeon: Jovan David MD;  Location: [facility] (4TH FLR);  Service: Endoscopy;  Laterality: N/A;  HCC. Listed for liver transplant. EGD for variceal surveillance.  cardiac clearance and blood thinenr approval received, see telephone encounter 6/23/21-BB  fully vaccinated-BB  labs same day of procedure-BB    EXCISION OF HYDROCELE Right     hemorroid surgery      hemorroidectomy      KIDNEY STONE SURGERY      LAPAROSCOPIC APPENDECTOMY N/A 6/2/2023    Procedure: APPENDECTOMY, LAPAROSCOPIC;  Surgeon: Shan Ross MD;  [facility];  Service: General;  Laterality: N/A;    LEFT HEART CATHETERIZATION N/A  1/27/2021    Procedure: Left heart cath;  Surgeon: Kris Shelton MD;  Location: [facility] CATH LAB;  Service: Cardiology;  Laterality: N/A;    liver mass removal      LIVER TRANSPLANT N/A 1/6/2022    Procedure: TRANSPLANT, LIVER;  Surgeon: Lizet Garcia MD;  Location: [facility] OR 2ND FLR;  Service: Transplant;  Laterality: N/A;    RIGHT HEART CATHETERIZATION Right 5/7/2021    Procedure: INSERTION, CATHETER, RIGHT HEART;  Surgeon: Jaden Schuster MD;  Location: [facility] CATH LAB;  Service: Cardiology;  Laterality: Right;    STENT, DRUG ELUTING, SINGLE VESSEL, CORONARY  12/26/2023    Procedure: Stent, Drug Eluting, Single Vessel, Coronary;  Surgeon: Carlos King MD;  Location: [facility] CATH LAB;  Service: Cardiology;;    TREATMENT OF CARDIAC ARRHYTHMIA N/A 5/19/2021    Procedure: CARDIOVERSION;  Surgeon: Didier Tilley MD;  Location: [facility] EP LAB;  Service: Cardiology;  Laterality: N/A;  a fib, dccv/johny, mac, dm. sscu    TREATMENT OF CARDIAC ARRHYTHMIA N/A 5/31/2021    Procedure: CARDIOVERSION;  Surgeon: Daniel Arambula MD;  Location: [facility] EP LAB;  Service: Cardiology;  Laterality: N/A;  afib, DCCV, anest., DM, 3 prep    TREATMENT OF CARDIAC ARRHYTHMIA N/A 7/7/2021    Procedure: Cardioversion or Defibrillation;  Surgeon: Didier Tilley MD;  Location: [facility] EP LAB;  Service: Cardiology;  Laterality: N/A;  AF, JOHNY (cancel if complaint), DCCV, MAC, DM, 3 Prep    TREATMENT OF CARDIAC ARRHYTHMIA N/A 7/27/2021    Procedure: Cardioversion or Defibrillation;  Surgeon: Didier Tilley MD;  Location: [facility] EP LAB;  Service: Cardiology;  Laterality: N/A;  AF, JOHNY (cx if complaint), DCCV, MAC, DM, 3 Prep    TREATMENT OF CARDIAC ARRHYTHMIA N/A 1/26/2024    Procedure: Cardioversion or Defibrillation;  Surgeon: Koko Kinght MD;  Location: [facility] EP LAB;  Service: Cardiology;  Laterality: N/A;  AF, JOHNY, DCCV, MAC, DM, 3 Prep *ADENOSINE TEST* *LIVER TRANSPLANT PATIENT*     ALLERGIES updated and reviewed.  Review of patient's allergies  indicates:   Allergen Reactions    Bee pollens Swelling     BEE STINGS swells body       CURRENT OUTPATIENT MEDICATIONS updated and reviewed    Current Outpatient Medications:     apixaban (ELIQUIS) 5 mg Tab, Take 1 tablet (5 mg total) by mouth 2 (two) times daily., Disp: 180 tablet, Rfl: 3    atorvastatin (LIPITOR) 80 MG tablet, Take 1 tablet (80 mg total) by mouth once daily., Disp: 90 tablet, Rfl: 3    blood sugar diagnostic Strp, To check BG 2 times daily, to use with insurance preferred meter (patient has a [employer] self-monitoring glucose meter), Disp: 100 strip, Rfl: 11    blood-glucose meter kit, To check BG 2 times daily, to use with insurance preferred meter, Disp: 1 each, Rfl: 0    carvediloL (COREG) 12.5 MG tablet, Take 1 tablet (12.5 mg total) by mouth 2 (two) times daily with meals., Disp: 180 tablet, Rfl: 3    clopidogreL (PLAVIX) 75 mg tablet, Take 1 tablet (75 mg total) by mouth once daily., Disp: 90 tablet, Rfl: 3    diazePAM (VALIUM) 5 MG tablet, Take 1 tablet (5 mg total) by mouth as needed for Anxiety. Take 1 tab 30 min prior to MRI, take 2nd as needed, Disp: 2 tablet, Rfl: 0    empagliflozin (JARDIANCE) 10 mg tablet, Take 1 tablet (10 mg total) by mouth once daily., Disp: 90 tablet, Rfl: 3    ferrous sulfate (IRON) 325 mg (65 mg iron) Tab tablet, Take 1 tablet (325 mg total) by mouth once daily., Disp: 30 tablet, Rfl: 2    furosemide (LASIX) 20 MG tablet, Take 1 tablet (20 mg total) by mouth once daily., Disp: 90 tablet, Rfl: 3    insulin glargine U-100, Lantus, (LANTUS SOLOSTAR U-100 INSULIN) 100 unit/mL (3 mL) InPn pen, Inject 22 Units into the skin every evening., Disp: 15 mL, Rfl: 11    insulin lispro (HUMALOG KWIKPEN INSULIN) 100 unit/mL pen, Inject 6 Units into the skin 3 (three) times daily with meals. Plus sliding scale, MDD: 48 units, Disp: 15 mL, Rfl: 5    lancets Misc, To check BG 2 times daily, to use with insurance preferred meter, Disp: 100 each, Rfl: 11    magnesium oxide  "(MAG-OX) 400 mg (241.3 mg magnesium) tablet, Take 1 tablet (400 mg total) by mouth 2 (two) times daily., Disp: 60 tablet, Rfl: 11    multivitamin capsule, Take 1 capsule by mouth once daily., Disp: , Rfl:     omega-3 fatty acids/fish oil (FISH OIL-OMEGA-3 FATTY ACIDS) 300-1,000 mg capsule, Take 1 capsule by mouth once daily. , Disp: , Rfl:     pen needle, diabetic (BD ULTRA-FINE GÉNESIS PEN NEEDLE) 32 gauge x 5/32" Ndle, Use to inject insulin 4 times daily, Disp: 200 each, Rfl: 11    pregabalin (LYRICA) 75 MG capsule, Take 1 capsule (75 mg total) by mouth 2 (two) times daily., Disp: 60 capsule, Rfl: 0    sacubitriL-valsartan (ENTRESTO) 49-51 mg per tablet, Take 1 tablet by mouth 2 (two) times daily., Disp: 180 tablet, Rfl: 3    buPROPion (WELLBUTRIN XL) 150 MG TB24 tablet, Take 1 tablet (150 mg total) by mouth every morning., Disp: 90 tablet, Rfl: 1    HYDROcodone-acetaminophen (NORCO) 5-325 mg per tablet, Take 1 tablet by mouth every 8 (eight) hours as needed for Pain. (Patient not taking: Reported on 9/16/2024), Disp: 15 tablet, Rfl: 0    pantoprazole (PROTONIX) 40 MG tablet, Take 1 tablet (40 mg total) by mouth once daily., Disp: 90 tablet, Rfl: 1    QUEtiapine (SEROQUEL) 100 MG Tab, Take 1 tablet (100 mg total) by mouth every evening., Disp: 90 tablet, Rfl: 1    tacrolimus (PROGRAF) 0.5 MG Cap, Take 2 capsules (1 mg total) by mouth every morning AND 1 capsule (0.5 mg total) every evening., Disp: 90 capsule, Rfl: 11    Review of Systems   Constitutional:  Negative for activity change, appetite change, chills, diaphoresis, fatigue, fever and unexpected weight change.   HENT:  Negative for congestion, ear discharge, ear pain, facial swelling, hearing loss, nosebleeds, postnasal drip, rhinorrhea, sinus pressure, sneezing, sore throat, tinnitus, trouble swallowing and voice change.    Eyes:  Negative for photophobia, pain, discharge, redness, itching and visual disturbance.   Respiratory:  Negative for cough, chest " tightness, shortness of breath and wheezing.    Cardiovascular:  Negative for chest pain, palpitations and leg swelling.   Gastrointestinal:  Negative for abdominal distention, abdominal pain, anal bleeding, blood in stool, constipation, diarrhea, nausea, rectal pain and vomiting.   Endocrine: Negative for cold intolerance, heat intolerance, polydipsia, polyphagia and polyuria.   Genitourinary:  Negative for difficulty urinating, dysuria and flank pain.   Musculoskeletal:  Negative for arthralgias, back pain, joint swelling, myalgias and neck pain.   Skin:  Negative for rash.   Neurological:  Negative for dizziness, tremors, seizures, syncope, speech difficulty, weakness, light-headedness, numbness and headaches.   Psychiatric/Behavioral:  Negative for behavioral problems, confusion, decreased concentration, dysphoric mood, sleep disturbance and suicidal ideas. The patient is not nervous/anxious and is not hyperactive.        /78 (BP Location: Left arm, Patient Position: Sitting, BP Method: Medium (Manual))   Pulse 76   Resp 18   Ht 6' (1.829 m)   Wt 91.2 kg (201 lb 1 oz)   SpO2 99%   BMI 27.27 kg/m²   Physical Exam  Vitals and nursing note reviewed.   Constitutional:       General: He is not in acute distress.     Appearance: Normal appearance. He is well-developed. He is not ill-appearing or toxic-appearing.   HENT:      Head: Normocephalic and atraumatic.      Right Ear: Tympanic membrane, ear canal and external ear normal.      Left Ear: Tympanic membrane, ear canal and external ear normal.      Nose: Nose normal.      Mouth/Throat:      Lips: Pink.      Mouth: Mucous membranes are moist.      Pharynx: No oropharyngeal exudate or posterior oropharyngeal erythema.   Eyes:      General: No scleral icterus.        Right eye: No discharge.         Left eye: No discharge.      Extraocular Movements: Extraocular movements intact.      Conjunctiva/sclera: Conjunctivae normal.   Cardiovascular:      Rate and  Rhythm: Normal rate and regular rhythm.      Pulses: Normal pulses.      Heart sounds: Normal heart sounds. No murmur heard.  Pulmonary:      Effort: Pulmonary effort is normal. No respiratory distress.      Breath sounds: Normal breath sounds. No wheezing or rales.   Abdominal:      General: Bowel sounds are normal. There is no distension.      Palpations: Abdomen is soft. There is no mass.      Tenderness: There is no abdominal tenderness. There is no right CVA tenderness, left CVA tenderness, guarding or rebound.      Hernia: No hernia is present.   Musculoskeletal:      Cervical back: Normal range of motion and neck supple. No rigidity or tenderness.   Lymphadenopathy:      Cervical: No cervical adenopathy.   Skin:     General: Skin is warm and dry.   Neurological:      General: No focal deficit present.      Mental Status: He is alert. Mental status is at baseline.   Psychiatric:         Mood and Affect: Mood normal.         Behavior: Behavior normal. Behavior is cooperative.         ASSESSMENT/PLAN    Tej was seen today for establish care and annual exam.    Diagnoses and all orders for this visit:    Routine medical exam  -     CBC Without Differential; Future  -     Comprehensive Metabolic Panel; Future  -     Lipid Panel; Future  -     TSH; Future  -     Hemoglobin A1C; Future    Depression, unspecified depression type  -     buPROPion (WELLBUTRIN XL) 150 MG TB24 tablet; Take 1 tablet (150 mg total) by mouth every morning.  -     QUEtiapine (SEROQUEL) 100 MG Tab; Take 1 tablet (100 mg total) by mouth every evening.    Mixed hyperlipidemia    Persistent atrial fibrillation    Portal hypertension    Other cirrhosis of liver    Recurrent major depressive disorder, in partial remission  -     Ambulatory referral/consult to Psychiatry; Future    Type 2 diabetes mellitus with hyperglycemia, with long-term current use of insulin  -     Ambulatory referral/consult to Endocrinology; Future    Abnormal finding of  blood chemistry, unspecified  -     CBC Without Differential; Future  -     Lipid Panel; Future  -     Hemoglobin A1C; Future    Other general symptoms and signs  -     TSH; Future    Chronic HFrEF (heart failure with reduced ejection fraction)    Coronary artery disease involving native coronary artery of native heart without angina pectoris    History of cardioversion    PAD (peripheral artery disease)    Presence of drug-eluting stent in left circumflex coronary artery    Primary hypertension    S/P TAVR (transcatheter aortic valve replacement)    History of liver transplant    Anemia of chronic disease    Immunodeficiency due to drugs    History of cholangiocarcinoma    Thrombocytopenia, unspecified    Other orders  -     pantoprazole (PROTONIX) 40 MG tablet; Take 1 tablet (40 mg total) by mouth once daily.            Most recent some lab results reviewed with patient.  Any new prescription medications gone over in detail including reason for taking the medication, most common possible side effects and possible costs, etcetera.    Chronic conditions updated. Other than changes or additions as above, cont current medications and maintain follow-up with specialists if indicated.     Kole Sharpe MD  A dictation device was used to produce this document. Use of such devices sometimes results in grammatical errors or replacement of words that sound similarly.

## 2024-09-25 ENCOUNTER — LAB VISIT (OUTPATIENT)
Dept: LAB | Facility: HOSPITAL | Age: 69
End: 2024-09-25
Attending: INTERNAL MEDICINE
Payer: MEDICARE

## 2024-09-25 ENCOUNTER — PATIENT MESSAGE (OUTPATIENT)
Dept: ORTHOPEDICS | Facility: CLINIC | Age: 69
End: 2024-09-25
Payer: MEDICARE

## 2024-09-25 ENCOUNTER — TELEPHONE (OUTPATIENT)
Dept: TRANSPLANT | Facility: CLINIC | Age: 69
End: 2024-09-25
Payer: MEDICARE

## 2024-09-25 DIAGNOSIS — Z94.4 S/P LIVER TRANSPLANT: ICD-10-CM

## 2024-09-25 LAB
ALBUMIN SERPL BCP-MCNC: 3.4 G/DL (ref 3.5–5.2)
ALP SERPL-CCNC: 73 U/L (ref 55–135)
ALT SERPL W/O P-5'-P-CCNC: 13 U/L (ref 10–44)
ANION GAP SERPL CALC-SCNC: 8 MMOL/L (ref 8–16)
AST SERPL-CCNC: 17 U/L (ref 10–40)
BASOPHILS # BLD AUTO: 0.04 K/UL (ref 0–0.2)
BASOPHILS NFR BLD: 0.7 % (ref 0–1.9)
BILIRUB SERPL-MCNC: 0.4 MG/DL (ref 0.1–1)
BUN SERPL-MCNC: 34 MG/DL (ref 8–23)
CALCIUM SERPL-MCNC: 9 MG/DL (ref 8.7–10.5)
CHLORIDE SERPL-SCNC: 107 MMOL/L (ref 95–110)
CO2 SERPL-SCNC: 26 MMOL/L (ref 23–29)
CREAT SERPL-MCNC: 1.6 MG/DL (ref 0.5–1.4)
DIFFERENTIAL METHOD BLD: ABNORMAL
EOSINOPHIL # BLD AUTO: 0.3 K/UL (ref 0–0.5)
EOSINOPHIL NFR BLD: 4.7 % (ref 0–8)
ERYTHROCYTE [DISTWIDTH] IN BLOOD BY AUTOMATED COUNT: 14.8 % (ref 11.5–14.5)
EST. GFR  (NO RACE VARIABLE): 46.1 ML/MIN/1.73 M^2
GLUCOSE SERPL-MCNC: 123 MG/DL (ref 70–110)
HCT VFR BLD AUTO: 38.2 % (ref 40–54)
HGB BLD-MCNC: 11.9 G/DL (ref 14–18)
IMM GRANULOCYTES # BLD AUTO: 0.02 K/UL (ref 0–0.04)
IMM GRANULOCYTES NFR BLD AUTO: 0.3 % (ref 0–0.5)
LYMPHOCYTES # BLD AUTO: 1.4 K/UL (ref 1–4.8)
LYMPHOCYTES NFR BLD: 24.1 % (ref 18–48)
MAGNESIUM SERPL-MCNC: 2.9 MG/DL (ref 1.6–2.6)
MCH RBC QN AUTO: 32.6 PG (ref 27–31)
MCHC RBC AUTO-ENTMCNC: 31.2 G/DL (ref 32–36)
MCV RBC AUTO: 105 FL (ref 82–98)
MONOCYTES # BLD AUTO: 0.7 K/UL (ref 0.3–1)
MONOCYTES NFR BLD: 11.4 % (ref 4–15)
NEUTROPHILS # BLD AUTO: 3.5 K/UL (ref 1.8–7.7)
NEUTROPHILS NFR BLD: 58.8 % (ref 38–73)
NRBC BLD-RTO: 0 /100 WBC
PLATELET # BLD AUTO: 90 K/UL (ref 150–450)
PMV BLD AUTO: 12.4 FL (ref 9.2–12.9)
POTASSIUM SERPL-SCNC: 4.7 MMOL/L (ref 3.5–5.1)
PROT SERPL-MCNC: 6.1 G/DL (ref 6–8.4)
RBC # BLD AUTO: 3.65 M/UL (ref 4.6–6.2)
SODIUM SERPL-SCNC: 141 MMOL/L (ref 136–145)
WBC # BLD AUTO: 5.9 K/UL (ref 3.9–12.7)

## 2024-09-25 PROCEDURE — 36415 COLL VENOUS BLD VENIPUNCTURE: CPT | Mod: PN | Performed by: INTERNAL MEDICINE

## 2024-09-25 PROCEDURE — 85025 COMPLETE CBC W/AUTO DIFF WBC: CPT | Performed by: INTERNAL MEDICINE

## 2024-09-25 PROCEDURE — 80053 COMPREHEN METABOLIC PANEL: CPT | Performed by: INTERNAL MEDICINE

## 2024-09-25 PROCEDURE — 80197 ASSAY OF TACROLIMUS: CPT | Performed by: INTERNAL MEDICINE

## 2024-09-25 PROCEDURE — 83735 ASSAY OF MAGNESIUM: CPT | Performed by: INTERNAL MEDICINE

## 2024-09-25 NOTE — TELEPHONE ENCOUNTER
Called patient to let them know Dr Maxwell' recommendation.  He stated he did not feel like sitting in the waiting room and he did not feel is was anything urgent.  He did say if he felt bad enough he would go.    ----- Message from Thais Maxwell MD sent at 9/25/2024  1:05 PM CDT -----  Sounds like he needs to come to the ER. R/o other things going on. Would not assume it is simply prograf  ----- Message -----  From: Aniya Amaya RN  Sent: 9/25/2024  12:43 PM CDT  To: Thais Maxwell MD    His wife sent me this message:  AniyaTravis got the shakes yesterday. He's also extremely weak. He has to hold on when walking.  I think maybe it's from upping Prograf. 0.5 to 1.5.  -aniya    He had labs today.  I told him we needed to see his level to know if he needs a decrease in his medication.  Thoughts?

## 2024-09-26 ENCOUNTER — TELEPHONE (OUTPATIENT)
Dept: TRANSPLANT | Facility: CLINIC | Age: 69
End: 2024-09-26
Payer: MEDICARE

## 2024-09-26 ENCOUNTER — PATIENT MESSAGE (OUTPATIENT)
Dept: TRANSPLANT | Facility: CLINIC | Age: 69
End: 2024-09-26
Payer: MEDICARE

## 2024-09-26 ENCOUNTER — PATIENT MESSAGE (OUTPATIENT)
Dept: PRIMARY CARE CLINIC | Facility: CLINIC | Age: 69
End: 2024-09-26
Payer: MEDICARE

## 2024-09-26 LAB — TACROLIMUS BLD-MCNC: 4.4 NG/ML (ref 5–15)

## 2024-09-26 NOTE — TELEPHONE ENCOUNTER
Sent message to let patient know.    ----- Message from Thais Maxwell MD sent at 9/26/2024  9:23 AM CDT -----  Prograf level is NOT high. Repeat labs on Monday. If pt still feeling unwell- should go to ER - please let patient know.

## 2024-09-26 NOTE — PROGRESS NOTES
Rollator walker previously ordered for patient    The mobility limitation cannot be sufficiently resolved by the use of a cane. Patient's functional mobility deficit can be sufficiently resolved with the use of a (Rollator, Rolling Walker or Walker). Patient's mobility limitation significantly impairs their ability to participate in one of more activities of daily living. The use of a (Rollator, RW or Walker) will significantly improve the patient's ability to participate in MRADLS and the patient will use it on regular basis in the home.     
warm and dry/color normal/no rashes/no ulcers

## 2024-09-28 NOTE — TELEPHONE ENCOUNTER
Update message received from patient/wife via portal:    Travis is in hospital getting his appendix out this morning.  White cell count high.     Statement Selected

## 2024-09-30 ENCOUNTER — LAB VISIT (OUTPATIENT)
Dept: LAB | Facility: HOSPITAL | Age: 69
End: 2024-09-30
Attending: INTERNAL MEDICINE
Payer: MEDICARE

## 2024-09-30 DIAGNOSIS — Z94.4 S/P LIVER TRANSPLANT: ICD-10-CM

## 2024-09-30 LAB
ALBUMIN SERPL BCP-MCNC: 3.3 G/DL (ref 3.5–5.2)
ALP SERPL-CCNC: 72 U/L (ref 55–135)
ALT SERPL W/O P-5'-P-CCNC: 15 U/L (ref 10–44)
ANION GAP SERPL CALC-SCNC: 6 MMOL/L (ref 8–16)
AST SERPL-CCNC: 18 U/L (ref 10–40)
BASOPHILS # BLD AUTO: 0.06 K/UL (ref 0–0.2)
BASOPHILS NFR BLD: 0.8 % (ref 0–1.9)
BILIRUB SERPL-MCNC: 0.6 MG/DL (ref 0.1–1)
BUN SERPL-MCNC: 25 MG/DL (ref 8–23)
CALCIUM SERPL-MCNC: 9.3 MG/DL (ref 8.7–10.5)
CHLORIDE SERPL-SCNC: 110 MMOL/L (ref 95–110)
CO2 SERPL-SCNC: 24 MMOL/L (ref 23–29)
CREAT SERPL-MCNC: 1.4 MG/DL (ref 0.5–1.4)
DIFFERENTIAL METHOD BLD: ABNORMAL
EOSINOPHIL # BLD AUTO: 0.3 K/UL (ref 0–0.5)
EOSINOPHIL NFR BLD: 3.6 % (ref 0–8)
ERYTHROCYTE [DISTWIDTH] IN BLOOD BY AUTOMATED COUNT: 14.4 % (ref 11.5–14.5)
EST. GFR  (NO RACE VARIABLE): 54.1 ML/MIN/1.73 M^2
GLUCOSE SERPL-MCNC: 85 MG/DL (ref 70–110)
HCT VFR BLD AUTO: 40.1 % (ref 40–54)
HGB BLD-MCNC: 12.4 G/DL (ref 14–18)
IMM GRANULOCYTES # BLD AUTO: 0.03 K/UL (ref 0–0.04)
IMM GRANULOCYTES NFR BLD AUTO: 0.4 % (ref 0–0.5)
LYMPHOCYTES # BLD AUTO: 1.1 K/UL (ref 1–4.8)
LYMPHOCYTES NFR BLD: 15.7 % (ref 18–48)
MAGNESIUM SERPL-MCNC: 2.2 MG/DL (ref 1.6–2.6)
MCH RBC QN AUTO: 31.9 PG (ref 27–31)
MCHC RBC AUTO-ENTMCNC: 30.9 G/DL (ref 32–36)
MCV RBC AUTO: 103 FL (ref 82–98)
MONOCYTES # BLD AUTO: 0.6 K/UL (ref 0.3–1)
MONOCYTES NFR BLD: 8.8 % (ref 4–15)
NEUTROPHILS # BLD AUTO: 5.1 K/UL (ref 1.8–7.7)
NEUTROPHILS NFR BLD: 70.7 % (ref 38–73)
NRBC BLD-RTO: 0 /100 WBC
PLATELET # BLD AUTO: 92 K/UL (ref 150–450)
PMV BLD AUTO: 12.2 FL (ref 9.2–12.9)
POTASSIUM SERPL-SCNC: 4.8 MMOL/L (ref 3.5–5.1)
PROT SERPL-MCNC: 6.3 G/DL (ref 6–8.4)
RBC # BLD AUTO: 3.89 M/UL (ref 4.6–6.2)
SODIUM SERPL-SCNC: 140 MMOL/L (ref 136–145)
WBC # BLD AUTO: 7.27 K/UL (ref 3.9–12.7)

## 2024-09-30 PROCEDURE — 36415 COLL VENOUS BLD VENIPUNCTURE: CPT | Mod: PN | Performed by: INTERNAL MEDICINE

## 2024-09-30 PROCEDURE — 83735 ASSAY OF MAGNESIUM: CPT | Performed by: INTERNAL MEDICINE

## 2024-09-30 PROCEDURE — 80197 ASSAY OF TACROLIMUS: CPT | Performed by: INTERNAL MEDICINE

## 2024-09-30 PROCEDURE — 80053 COMPREHEN METABOLIC PANEL: CPT | Performed by: INTERNAL MEDICINE

## 2024-09-30 PROCEDURE — 85025 COMPLETE CBC W/AUTO DIFF WBC: CPT | Performed by: INTERNAL MEDICINE

## 2024-10-01 LAB — TACROLIMUS BLD-MCNC: 6.3 NG/ML (ref 5–15)

## 2024-10-02 ENCOUNTER — OFFICE VISIT (OUTPATIENT)
Dept: TRANSPLANT | Facility: CLINIC | Age: 69
End: 2024-10-02
Payer: MEDICARE

## 2024-10-02 ENCOUNTER — PATIENT MESSAGE (OUTPATIENT)
Dept: ORTHOPEDICS | Facility: CLINIC | Age: 69
End: 2024-10-02
Payer: MEDICARE

## 2024-10-02 ENCOUNTER — TELEPHONE (OUTPATIENT)
Dept: TRANSPLANT | Facility: CLINIC | Age: 69
End: 2024-10-02
Payer: MEDICARE

## 2024-10-02 ENCOUNTER — PATIENT MESSAGE (OUTPATIENT)
Dept: TRANSPLANT | Facility: CLINIC | Age: 69
End: 2024-10-02
Payer: MEDICARE

## 2024-10-02 VITALS
DIASTOLIC BLOOD PRESSURE: 59 MMHG | OXYGEN SATURATION: 96 % | HEIGHT: 72 IN | BODY MASS INDEX: 27.56 KG/M2 | HEART RATE: 54 BPM | SYSTOLIC BLOOD PRESSURE: 128 MMHG | WEIGHT: 203.5 LBS

## 2024-10-02 DIAGNOSIS — S92.024A CLOSED NONDISPLACED FRACTURE OF ANTERIOR PROCESS OF RIGHT CALCANEUS, INITIAL ENCOUNTER: Primary | ICD-10-CM

## 2024-10-02 DIAGNOSIS — M53.3 COCCYX PAIN: ICD-10-CM

## 2024-10-02 DIAGNOSIS — C22.1 CHOLANGIOCARCINOMA: Chronic | ICD-10-CM

## 2024-10-02 DIAGNOSIS — C22.1 CHOLANGIOCARCINOMA: Primary | ICD-10-CM

## 2024-10-02 DIAGNOSIS — Z29.89 PROPHYLACTIC IMMUNOTHERAPY: Primary | ICD-10-CM

## 2024-10-02 DIAGNOSIS — M25.561 ACUTE PAIN OF RIGHT KNEE: ICD-10-CM

## 2024-10-02 DIAGNOSIS — Z94.4 S/P LIVER TRANSPLANT: Chronic | ICD-10-CM

## 2024-10-02 PROCEDURE — 3008F BODY MASS INDEX DOCD: CPT | Mod: CPTII,S$GLB,, | Performed by: INTERNAL MEDICINE

## 2024-10-02 PROCEDURE — 1100F PTFALLS ASSESS-DOCD GE2>/YR: CPT | Mod: CPTII,S$GLB,, | Performed by: INTERNAL MEDICINE

## 2024-10-02 PROCEDURE — 99999 PR PBB SHADOW E&M-EST. PATIENT-LVL V: CPT | Mod: PBBFAC,,, | Performed by: INTERNAL MEDICINE

## 2024-10-02 PROCEDURE — 3074F SYST BP LT 130 MM HG: CPT | Mod: CPTII,S$GLB,, | Performed by: INTERNAL MEDICINE

## 2024-10-02 PROCEDURE — 99215 OFFICE O/P EST HI 40 MIN: CPT | Mod: S$GLB,,, | Performed by: INTERNAL MEDICINE

## 2024-10-02 PROCEDURE — 4010F ACE/ARB THERAPY RXD/TAKEN: CPT | Mod: CPTII,S$GLB,, | Performed by: INTERNAL MEDICINE

## 2024-10-02 PROCEDURE — 3288F FALL RISK ASSESSMENT DOCD: CPT | Mod: CPTII,S$GLB,, | Performed by: INTERNAL MEDICINE

## 2024-10-02 PROCEDURE — 3078F DIAST BP <80 MM HG: CPT | Mod: CPTII,S$GLB,, | Performed by: INTERNAL MEDICINE

## 2024-10-02 PROCEDURE — 3044F HG A1C LEVEL LT 7.0%: CPT | Mod: CPTII,S$GLB,, | Performed by: INTERNAL MEDICINE

## 2024-10-02 PROCEDURE — 1125F AMNT PAIN NOTED PAIN PRSNT: CPT | Mod: CPTII,S$GLB,, | Performed by: INTERNAL MEDICINE

## 2024-10-02 NOTE — Clinical Note
1. Ct abdomen 10/24- will add pelvis and will do as CA 19-9 and every 6 months 2. F/u with cardiology (Marisa Quijano) - can he stop eliquis since is falling  3. hold lasix since is lightheaded with some of the falls 4. Start CA 1000 mg daily with 2000 units of vit D daily; repeat bone density 03/25 5. Colonoscopy- now overdue 6. Dermatology- return 5/25 7. Have messaged ortho re PT for hx fractured foot and neuropathy 8. Return 6 months

## 2024-10-02 NOTE — LETTER
October 6, 2024        Jessika Carbone  [address] [facility] [address]  Phone: [phone]  Fax: [phone]             [facility]  [address] [address]  Phone: [phone]  Fax: [phone]   Patient: Tej Purdy   MR Number: [id number]   YOB: 1955   Date of Visit: 10/2/2024       Dear Dr. Jessika Carbone    Thank you for referring Tej Purdy to me for evaluation. Attached you will find relevant portions of my assessment and plan of care.    If you have questions, please do not hesitate to call me. I look forward to following Tej Purdy along with you.    Sincerely,    Thais Maxwell MD    Enclosure    If you would like to receive this communication electronically, please contact [email or web address] or [phone] to request [employer] access.    [employer] is a tool which provides read-only access to select patient information with whom you have a relationship. Its easy to use and provides real time access to review your patients record including encounter summaries, notes, results, and demographic information.    If you feel you have received this communication in error or would no longer like to receive these types of communications, please e-mail [email or web address]

## 2024-10-02 NOTE — PATIENT INSTRUCTIONS
Ct abdomen 10/24- will add pelvis and will do as CA 19-9 and every 6 months  F/u with cardiology (Marisa Quijano) - can he stop eliquis since is falling   hold lasix since is lightheaded with some of the falls  Start CA 1000 mg daily with 2000 units of vit D daily; repeat bone density 03/25  Colonoscopy- now overdue  Dermatology- return 5/25  Have messaged ortho re PT for hx fractured foot and neuropathy  Return 6 months

## 2024-10-02 NOTE — Clinical Note
Marisa, pt on eliquis and plavix and is falling (?neuropathy)- he has had fractures; need card f/u to see if can come off the blood thinners since is falling

## 2024-10-02 NOTE — PROGRESS NOTES
Transplant Hepatology  Liver Transplant Recipient Follow-up    Transplant Date: 1/6/2022  UNOS Native Liver Dx: Primary Liver Malignancy: Hepatoma (HCC) (final path revealed cholangiocarcinoma) and Cirrhosis    Tej is here for follow up of his liver transplant.    ORGAN: LIVER  Whole or Partial: whole liver  Donor Type: donation after brain death  CDC High Risk: no  Donor CMV Status: Positive  Donor HCV Status: Negative  Donor HBcAb: Negative  Donor HBV JAYLIN:   Donor HCV JAYLIN: Negative  Biliary Anastomosis: end to end  Arterial Anatomy: replaced left hepatic from left gastric  IVC reconstruction: end to end ivc  Portal vein status: patent    EXPLANT: 7 cm tumor cholangiocarcinoma, necrotic, no vascular invasion, cirrhosis, 1 lymph node negative    He has had the following complications since transplant: none.     Subjective:     Interval History: Tej is now 2 years and 9 months post liver transplant. Currently, he is doing well except he has been falling and has had fractures (?sec to neuropathy). Of note is on both plavix and eliquis. He has had several admissions for heart failure and underwent a TAVR on 2/1/24. He now feels the best he has felt since liver transplant. He was also admitted for suicidal ideation. He is now improved on zoloft/wellbutrin/seroquel.     Allograft function 9/30/24: ALT 15, AST 18, ALKP 72, Tbil 0.6, creat 1.4, prograf 6.3, CA 19-9 <2.1 (9/18/24)  IS: prograf   Immunoprophylaxis:  Aspirin: YES/NO(WHY) DOSE: 81 mg   Apixaban/Eliquis     Abdo US doppler 12/23/23: satisfactory doppler; mild intrahepatic duct dilation left lobe  Hx cholangiocarcinoma: saw oncology: adjuvant chemo deferred   Ct abdo w contrast 4/12/24: no obvious recurrence of cholangio;  Ct chest wo contrast 4/12/24: no metastases    Health maintenance  --colonoscopy-repeat 2023-now overdue  --bone density 3/14/23: Osteopenia; Daily calcium intake 8107-2132 mg, dietary sources preferred; Vitamin D 0383-3132 IU daily;  Weight bearing exercise and fall precautions; Repeat BMD in 2 years; repeat bone density 3/25  Dermatology: 5/31/24- no skin cancer-return 5/25    Review of Systems   HENT: Negative.     Eyes: Negative.    Respiratory: Negative.     Cardiovascular: Negative.    Genitourinary: Negative.    Musculoskeletal: Negative.    Skin: Negative.    Psychiatric/Behavioral: Negative.         Objective:     Vitals:    10/02/24 1609   BP: (!) 128/59   Pulse: (!) 54       Physical Exam  Constitutional:       Appearance: Normal appearance.   Eyes:      General: No scleral icterus.  Cardiovascular:      Rate and Rhythm: Normal rate and regular rhythm.   Pulmonary:      Effort: Pulmonary effort is normal.   Abdominal:      General: Abdomen is flat. There is no distension.      Palpations: Abdomen is soft. There is no mass.      Tenderness: There is no abdominal tenderness.   Musculoskeletal:         General: No swelling.   Skin:     General: Skin is warm and dry.   Neurological:      Mental Status: He is alert and oriented to person, place, and time.   Psychiatric:         Mood and Affect: Mood normal.            Lab Results   Component Value Date    BILITOT 0.6 09/30/2024    AST 18 09/30/2024    ALT 15 09/30/2024    ALKPHOS 72 09/30/2024    CREATININE 1.4 09/30/2024    ALBUMIN 3.3 (L) 09/30/2024     Lab Results   Component Value Date    WBC 7.27 09/30/2024    HGB 12.4 (L) 09/30/2024    HCT 40.1 09/30/2024    HCT 39 08/25/2024    PLT 92 (L) 09/30/2024     Lab Results   Component Value Date    TACROLIMUS 6.3 09/30/2024       Assessment/Plan:   The patient is a 68 year old male who is now 2 years and 9 months post liver transplant. He has good allograft function. Recent severe depression-now improved. Heart failure now s/p TAVR. Current recommendations:  1. S/p liver transplant and control of IS: good allograft function, prograf target 4-6;  2. Prophylaxis:  Aspirin: YES/NO(WHY) DOSE: 81 mg   Eliquis, plavix-f/u with cardiology asap- may  need to d/c since is falling  3. Cholangiocarcinoma: chemotx deferred; continue surveillance imaging and cea/ca19-9 every 3 months-due 10/24  4. Ascites: resolved- continue off lasix  5.  Health maintenance: the patient should see a dermatologist annually to screen for skin cancer (has a hx of skin cancer) (return visit 5/25), perform regular colonoscopies (due 2023-now overdue)  6. R/O osteoporosis. I am recommending a repeat bone density to r/o osteoporosis in 3/2025; start ca 1000 mg daily with vit D  7. Heart failure: F/u with cardiology   8. PT per ortho    Return 6 months  A total of 60 minutes was spent reviewing the patient's chart, examining the patient, reviewing labs and imaging and coordinating care with the patient's care team.        Thais Maxwell MD           [facility] Patient Status  Functional Status: 70% - Cares for self: unable to carry on normal activity or active work  Physical Capacity: No Limitations    New diabetes onset between last follow-up to the current follow-up: No  Did patient have any acute rejection episodes during the follow-up period: No  Post transplant malignancy: No

## 2024-10-02 NOTE — TELEPHONE ENCOUNTER
Sent patient message via portal to let him know labs are stable.  No medication changes, next labs due on 12/16/24    ----- Message from Thais Maxwell MD sent at 10/1/2024  4:59 PM CDT -----  The Labs are stable - please let patient know.

## 2024-10-02 NOTE — TELEPHONE ENCOUNTER
----- Message from Thais Maxwell MD sent at 10/1/2024  4:59 PM CDT -----  The Labs are stable - please let patient know.

## 2024-10-03 ENCOUNTER — TELEPHONE (OUTPATIENT)
Dept: TRANSPLANT | Facility: CLINIC | Age: 69
End: 2024-10-03
Payer: MEDICARE

## 2024-10-03 DIAGNOSIS — C22.1 CHOLANGIOCARCINOMA: Primary | ICD-10-CM

## 2024-10-04 ENCOUNTER — PATIENT MESSAGE (OUTPATIENT)
Dept: CARDIOLOGY | Facility: CLINIC | Age: 69
End: 2024-10-04

## 2024-10-04 ENCOUNTER — OFFICE VISIT (OUTPATIENT)
Dept: CARDIOLOGY | Facility: CLINIC | Age: 69
End: 2024-10-04
Payer: MEDICARE

## 2024-10-04 ENCOUNTER — CLINICAL SUPPORT (OUTPATIENT)
Dept: CARDIOLOGY | Facility: HOSPITAL | Age: 69
End: 2024-10-04
Attending: STUDENT IN AN ORGANIZED HEALTH CARE EDUCATION/TRAINING PROGRAM
Payer: MEDICARE

## 2024-10-04 VITALS
SYSTOLIC BLOOD PRESSURE: 93 MMHG | BODY MASS INDEX: 27.83 KG/M2 | OXYGEN SATURATION: 95 % | HEIGHT: 72 IN | HEART RATE: 57 BPM | DIASTOLIC BLOOD PRESSURE: 54 MMHG | WEIGHT: 205.5 LBS

## 2024-10-04 DIAGNOSIS — E78.2 MIXED HYPERLIPIDEMIA: ICD-10-CM

## 2024-10-04 DIAGNOSIS — R42 DIZZINESS: ICD-10-CM

## 2024-10-04 DIAGNOSIS — I50.22 CHRONIC HFREF (HEART FAILURE WITH REDUCED EJECTION FRACTION): ICD-10-CM

## 2024-10-04 DIAGNOSIS — I10 PRIMARY HYPERTENSION: Chronic | ICD-10-CM

## 2024-10-04 DIAGNOSIS — I48.19 PERSISTENT ATRIAL FIBRILLATION: Chronic | ICD-10-CM

## 2024-10-04 DIAGNOSIS — Z98.890 S/P BALLOON AORTIC VALVULOPLASTY: ICD-10-CM

## 2024-10-04 DIAGNOSIS — I35.0 NONRHEUMATIC AORTIC VALVE STENOSIS: ICD-10-CM

## 2024-10-04 DIAGNOSIS — I25.10 CORONARY ARTERY DISEASE INVOLVING NATIVE CORONARY ARTERY OF NATIVE HEART WITHOUT ANGINA PECTORIS: Chronic | ICD-10-CM

## 2024-10-04 DIAGNOSIS — R42 DIZZINESS: Primary | ICD-10-CM

## 2024-10-04 LAB
OHS QRS DURATION: 94 MS
OHS QTC CALCULATION: 438 MS

## 2024-10-04 PROCEDURE — 99999 PR PBB SHADOW E&M-EST. PATIENT-LVL V: CPT | Mod: PBBFAC,GC,, | Performed by: STUDENT IN AN ORGANIZED HEALTH CARE EDUCATION/TRAINING PROGRAM

## 2024-10-04 PROCEDURE — 93005 ELECTROCARDIOGRAM TRACING: CPT | Mod: S$GLB,,, | Performed by: STUDENT IN AN ORGANIZED HEALTH CARE EDUCATION/TRAINING PROGRAM

## 2024-10-04 NOTE — PROGRESS NOTES
I have reviewed the notes, assessments, and/or procedures performed this visit, and I concur with the documentation.    Significant drop in EF - discussed importance of getting to TAVR Clinic for further eval following BAV

## 2024-10-04 NOTE — PROGRESS NOTES
PCP - Kole Sharpe MD  Referring Physician:     Subjective:   Patient ID:  Tej Purdy is a 68 y.o. male who presents for evaluation and treatment of heart failure and AS.    PMHx: paroxysmal atrial fibrillation, hypertension, hyperlipidemia, type 2 diabetes, symptomatic PAD,  hepatocellular carcinoma, post liver transplant (TACE/RFA [ ]). He had severe aortic stenosis now post BAV 24. He initially noticed improvement with more energy, stamina etc. Lately, patient has had several falls with 2 mechanical falls with 1 resulting in fractured ankle. 1-2 falls appear to be related to dizziness. No LOC or head trauma. Patient did not undergo TAVR and did not follow up after BAV.     History of AF/AFL and patient of Dr. Tilley's. EKG today.  Last echo was 2 days prior to BAV and showed EF 20-25% with severe AS and in AF. Prior echo in  EF 60%.     History:     Social History     Tobacco Use    Smoking status: Former     Current packs/day: 0.00     Types: Cigarettes     Quit date: 1980     Years since quittin.7    Smokeless tobacco: Former   Substance Use Topics    Alcohol use: Not Currently     Family History   Problem Relation Name Age of Onset    Coronary artery disease Father      Colon cancer Neg Hx      Esophageal cancer Neg Hx       Meds:     Review of patient's allergies indicates:   Allergen Reactions    Bee pollens Swelling     BEE STINGS swells body       Current Outpatient Medications:     apixaban (ELIQUIS) 5 mg Tab, Take 1 tablet (5 mg total) by mouth 2 (two) times daily., Disp: 180 tablet, Rfl: 3    atorvastatin (LIPITOR) 80 MG tablet, Take 1 tablet (80 mg total) by mouth once daily., Disp: 90 tablet, Rfl: 3    blood sugar diagnostic Strp, To check BG 2 times daily, to use with insurance preferred meter (patient has a [employer] self-monitoring glucose meter), Disp: 100 strip, Rfl: 11    blood-glucose meter kit, To check BG 2 times daily, to use with insurance preferred  meter, Disp: 1 each, Rfl: 0    buPROPion (WELLBUTRIN XL) 150 MG TB24 tablet, Take 1 tablet (150 mg total) by mouth every morning., Disp: 90 tablet, Rfl: 1    carvediloL (COREG) 12.5 MG tablet, Take 1 tablet (12.5 mg total) by mouth 2 (two) times daily with meals., Disp: 180 tablet, Rfl: 3    clopidogreL (PLAVIX) 75 mg tablet, Take 1 tablet (75 mg total) by mouth once daily., Disp: 90 tablet, Rfl: 3    diazePAM (VALIUM) 5 MG tablet, Take 1 tablet (5 mg total) by mouth as needed for Anxiety. Take 1 tab 30 min prior to MRI, take 2nd as needed, Disp: 2 tablet, Rfl: 0    empagliflozin (JARDIANCE) 10 mg tablet, Take 1 tablet (10 mg total) by mouth once daily., Disp: 90 tablet, Rfl: 3    ferrous sulfate (IRON) 325 mg (65 mg iron) Tab tablet, Take 1 tablet (325 mg total) by mouth once daily., Disp: 30 tablet, Rfl: 2    furosemide (LASIX) 20 MG tablet, Take 1 tablet (20 mg total) by mouth once daily., Disp: 90 tablet, Rfl: 3    HYDROcodone-acetaminophen (NORCO) 5-325 mg per tablet, Take 1 tablet by mouth every 8 (eight) hours as needed for Pain., Disp: 15 tablet, Rfl: 0    insulin glargine U-100, Lantus, (LANTUS SOLOSTAR U-100 INSULIN) 100 unit/mL (3 mL) InPn pen, Inject 22 Units into the skin every evening., Disp: 15 mL, Rfl: 11    insulin lispro (HUMALOG KWIKPEN INSULIN) 100 unit/mL pen, Inject 6 Units into the skin 3 (three) times daily with meals. Plus sliding scale, MDD: 48 units, Disp: 15 mL, Rfl: 5    lancets Misc, To check BG 2 times daily, to use with insurance preferred meter, Disp: 100 each, Rfl: 11    magnesium oxide (MAG-OX) 400 mg (241.3 mg magnesium) tablet, Take 1 tablet (400 mg total) by mouth 2 (two) times daily., Disp: 60 tablet, Rfl: 11    multivitamin capsule, Take 1 capsule by mouth once daily., Disp: , Rfl:     omega-3 fatty acids/fish oil (FISH OIL-OMEGA-3 FATTY ACIDS) 300-1,000 mg capsule, Take 1 capsule by mouth once daily. , Disp: , Rfl:     pantoprazole (PROTONIX) 40 MG tablet, Take 1 tablet (40  "mg total) by mouth once daily., Disp: 90 tablet, Rfl: 1    pen needle, diabetic (BD ULTRA-FINE GÉNESIS PEN NEEDLE) 32 gauge x 5/32" Ndle, Use to inject insulin 4 times daily, Disp: 200 each, Rfl: 11    pregabalin (LYRICA) 75 MG capsule, Take 1 capsule (75 mg total) by mouth 2 (two) times daily., Disp: 60 capsule, Rfl: 0    QUEtiapine (SEROQUEL) 100 MG Tab, Take 1 tablet (100 mg total) by mouth every evening., Disp: 90 tablet, Rfl: 1    sacubitriL-valsartan (ENTRESTO) 49-51 mg per tablet, Take 1 tablet by mouth 2 (two) times daily., Disp: 180 tablet, Rfl: 3    tacrolimus (PROGRAF) 0.5 MG Cap, Take 2 capsules (1 mg total) by mouth every morning AND 1 capsule (0.5 mg total) every evening., Disp: 90 capsule, Rfl: 11  Review of Systems   Constitutional:  Positive for malaise/fatigue. Negative for chills, diaphoresis and fever.   HENT:  Negative for sore throat.    Eyes:  Negative for double vision.   Respiratory:  Positive for shortness of breath. Negative for cough and wheezing.    Cardiovascular:  Negative for chest pain, palpitations, orthopnea, claudication, leg swelling and PND.   Gastrointestinal:  Negative for abdominal pain, blood in stool, constipation, heartburn, nausea and vomiting.   Genitourinary:  Negative for hematuria.   Musculoskeletal:  Positive for falls. Negative for myalgias.   Neurological:  Positive for dizziness. Negative for loss of consciousness, weakness and headaches.   Psychiatric/Behavioral:  The patient is not nervous/anxious.      Objective:   BP (!) 93/54 (Patient Position: Sitting)   Pulse (!) 57   Ht 6' (1.829 m)   Wt 93.2 kg (205 lb 7.5 oz)   SpO2 95%   BMI 27.87 kg/m²   BMI Readings from Last 15 Encounters:   10/04/24 27.87 kg/m²   10/02/24 27.60 kg/m²   09/16/24 27.27 kg/m²   08/30/24 27.12 kg/m²   08/25/24 27.12 kg/m²   05/28/24 29.60 kg/m²   05/20/24 29.66 kg/m²   05/07/24 29.66 kg/m²   04/08/24 29.75 kg/m²   02/28/24 29.02 kg/m²   02/16/24 29.18 kg/m²   02/14/24 29.00 kg/m² "   02/09/24 26.24 kg/m²   02/02/24 27.84 kg/m²   01/26/24 28.07 kg/m²      Physical Exam  Vitals and nursing note reviewed.   Constitutional:       General: He is not in acute distress.     Appearance: He is not diaphoretic.   HENT:      Head: Normocephalic and atraumatic.      Mouth/Throat:      Mouth: Mucous membranes are moist.   Eyes:      General: No scleral icterus.     Extraocular Movements: Extraocular movements intact.      Pupils: Pupils are equal, round, and reactive to light.   Neck:      Vascular: No carotid bruit, hepatojugular reflux or JVD.   Cardiovascular:      Rate and Rhythm: Regular rhythm. Bradycardia present.      Pulses: Normal pulses.      Heart sounds: Murmur (midpeaking right upper sternal border with radiation to carotids) heard.      No friction rub.   Pulmonary:      Effort: Pulmonary effort is normal. No respiratory distress.      Breath sounds: Normal breath sounds. No wheezing.   Chest:      Chest wall: No tenderness.   Abdominal:      General: Abdomen is flat. Bowel sounds are normal. There is no distension.      Tenderness: There is no abdominal tenderness.   Musculoskeletal:      Right lower leg: No edema.      Left lower leg: No edema.   Skin:     General: Skin is warm and dry.      Capillary Refill: Capillary refill takes less than 2 seconds.      Findings: No rash or wound.   Neurological:      General: No focal deficit present.      Mental Status: He is oriented to person, place, and time.   Psychiatric:         Mood and Affect: Mood normal.         Thought Content: Thought content normal.       Labs:     Lab Results   Component Value Date     09/30/2024    K 4.8 09/30/2024     09/30/2024    CO2 24 09/30/2024    BUN 25 (H) 09/30/2024    CREATININE 1.4 09/30/2024    GLUCOSE 125 (H) 08/23/2023    ANIONGAP 6 (L) 09/30/2024     Lab Results   Component Value Date    HGBA1C 5.1 09/18/2024     Lab Results   Component Value Date     (H) 02/28/2024     (H)  02/16/2024     (H) 02/09/2024    Lab Results   Component Value Date    WBC 7.27 09/30/2024    HGB 12.4 (L) 09/30/2024    HCT 40.1 09/30/2024    HCT 39 08/25/2024    PLT 92 (L) 09/30/2024    GRAN 5.1 09/30/2024    GRAN 70.7 09/30/2024     Lab Results   Component Value Date    CHOL 89 (L) 09/18/2024    HDL 26 (L) 09/18/2024    LDLCALC 36.2 (L) 09/18/2024    TRIG 134 09/18/2024          Cardiovascular Imaging:     Echo 1/29/24    Left Ventricle: The left ventricle is mildly dilated. Normal wall thickness. Moderate global hypokinesis present. There is severely reduced systolic function with a visually estimated ejection fraction of 20 - 25%. Unable to assess diastolic function due to atrial fibrillation.    Right Ventricle: Normal right ventricular cavity size. Wall thickness is normal. Right ventricle wall motion  is normal. Systolic function is normal.    Aortic Valve: There is severe aortic valve sclerosis. Severely restricted motion. There is likely severe aortic stenosis. The valve could not be adequately assessed by doppler. Please see prior report.    Pulmonary Artery: The estimated pulmonary artery systolic pressure is 20 mmHg.    IVC/SVC: Normal venous pressure at 3 mmHg.    Pericardium: There is a small circumferential effusion. There is slightly more fluid along the lateral border of the left ventricle. There is right atrial collapse. Transmital doppler is variable. Overall there is no evidence of tamponade.    Assessment & Plan:     1. Dizziness    2. Nonrheumatic aortic valve stenosis    3. S/P balloon aortic valvuloplasty    4. Chronic HFrEF (heart failure with reduced ejection fraction)    5. Coronary artery disease involving native coronary artery of native heart without angina pectoris    6. Persistent atrial fibrillation    7. Mixed hyperlipidemia    8. Primary hypertension      # Severe AS s/p BAV 2/1/24  -referral to interventional for assessment and TAVR workup  -echo as soon as  available    #HFrEF  -echo  -continue GDMT: jardiance, coreg, entresto. Consider adding spironolactone in future but BP lower side today   -lasix 20mg to PRN     #Dizziness  -event monitor to assess for arrhythmia  -EKG today with sinus bradycardia with 1st degree AVB 210ms  -follow up with Dr. Tilley    PREVENTION   For smokers: Educated and strongly counseled on smoking cessation.   Adopt a heart healthy diet  Counseled on various heart healthy diets  -Mediterranean diet- High in fruits vegetable, grains, fish, nuts and extra-virgin olive oil. Minimize processed, salty foods, packaged foods.  -Dash diet to lower HTN-Similar to mediterranean diet, low fat dairy, meatless meals and low sodium  -Plant based diet: Foundation being minimally processed and whole foods.   -Avoid foods in saturated fats and trans fats.   Aerobic exercise 30 minutes 5 times/ week.   Reduce and limit alcohol intake.   Learn stress management / relaxation techniques such as mindfulness and meditation.   Encourage self and family members to adopt healthy choices at a young age.     COMMUNICATION   Patients are encouraged to call clinic if they have any questions or concerns.   Clinic line is not for emergency purposes.  If they are feeling unwell and especially if they have certain red flag symptoms such as new or worsening  chest pain, chest pressure, shortness of breath, palpitations, dizziness, fall (especially on anticoagulants),   low blood pressure, focal weakness, etc., they should go to the emergency room.      Case discussed with Dr. Beasley. Unless there are intervening problems, patient should return for re-evaluation in 2 months.    Signed:  Reji Estevez M.D.  Cardiovascular Fellow  [facility]

## 2024-10-07 ENCOUNTER — CLINICAL SUPPORT (OUTPATIENT)
Dept: CARDIOLOGY | Facility: HOSPITAL | Age: 69
End: 2024-10-07
Attending: STUDENT IN AN ORGANIZED HEALTH CARE EDUCATION/TRAINING PROGRAM
Payer: MEDICARE

## 2024-10-07 PROCEDURE — 93271 ECG/MONITORING AND ANALYSIS: CPT

## 2024-10-07 RX ORDER — PREGABALIN 75 MG/1
75 CAPSULE ORAL 2 TIMES DAILY
Qty: 60 CAPSULE | Refills: 0 | Status: SHIPPED | OUTPATIENT
Start: 2024-10-07 | End: 2024-11-07

## 2024-10-08 ENCOUNTER — HOSPITAL ENCOUNTER (OUTPATIENT)
Dept: RADIOLOGY | Facility: HOSPITAL | Age: 69
Discharge: HOME | End: 2024-10-08
Attending: PHYSICIAN ASSISTANT
Payer: MEDICARE

## 2024-10-08 ENCOUNTER — HOSPITAL ENCOUNTER (OUTPATIENT)
Dept: RADIOLOGY | Facility: HOSPITAL | Age: 69
Discharge: HOME | End: 2024-10-08
Attending: INTERNAL MEDICINE
Payer: MEDICARE

## 2024-10-08 DIAGNOSIS — M25.561 ACUTE PAIN OF RIGHT KNEE: ICD-10-CM

## 2024-10-08 DIAGNOSIS — Z94.4 S/P LIVER TRANSPLANT: Primary | ICD-10-CM

## 2024-10-08 DIAGNOSIS — S92.024A CLOSED NONDISPLACED FRACTURE OF ANTERIOR PROCESS OF RIGHT CALCANEUS, INITIAL ENCOUNTER: ICD-10-CM

## 2024-10-08 DIAGNOSIS — M53.3 COCCYX PAIN: ICD-10-CM

## 2024-10-08 DIAGNOSIS — C22.1 CHOLANGIOCARCINOMA: ICD-10-CM

## 2024-10-08 DIAGNOSIS — E78.5 HYPERLIPIDEMIA, UNSPECIFIED HYPERLIPIDEMIA TYPE: ICD-10-CM

## 2024-10-08 PROCEDURE — 73610 X-RAY EXAM OF ANKLE: CPT | Mod: 26,RT,, | Performed by: INTERNAL MEDICINE

## 2024-10-08 PROCEDURE — 74178 CT ABD&PLV WO CNTR FLWD CNTR: CPT | Mod: TC

## 2024-10-08 PROCEDURE — 74178 CT ABD&PLV WO CNTR FLWD CNTR: CPT | Mod: 26,,, | Performed by: RADIOLOGY

## 2024-10-08 PROCEDURE — 73560 X-RAY EXAM OF KNEE 1 OR 2: CPT | Mod: TC,LT

## 2024-10-08 PROCEDURE — 72220 X-RAY EXAM SACRUM TAILBONE: CPT | Mod: 26,,, | Performed by: INTERNAL MEDICINE

## 2024-10-08 PROCEDURE — 73560 X-RAY EXAM OF KNEE 1 OR 2: CPT | Mod: 26,59,LT, | Performed by: INTERNAL MEDICINE

## 2024-10-08 PROCEDURE — 71270 CT THORAX DX C-/C+: CPT | Mod: TC

## 2024-10-08 PROCEDURE — 71270 CT THORAX DX C-/C+: CPT | Mod: 26,,, | Performed by: RADIOLOGY

## 2024-10-08 PROCEDURE — 25500020 PHARM REV CODE 255: Performed by: INTERNAL MEDICINE

## 2024-10-08 PROCEDURE — 73610 X-RAY EXAM OF ANKLE: CPT | Mod: TC,RT

## 2024-10-08 PROCEDURE — 73562 X-RAY EXAM OF KNEE 3: CPT | Mod: 26,RT,, | Performed by: INTERNAL MEDICINE

## 2024-10-08 PROCEDURE — 72220 X-RAY EXAM SACRUM TAILBONE: CPT | Mod: TC

## 2024-10-08 RX ADMIN — IOHEXOL 100 ML: 350 INJECTION, SOLUTION INTRAVENOUS at 03:10

## 2024-10-10 ENCOUNTER — TELEPHONE (OUTPATIENT)
Dept: TRANSPLANT | Facility: CLINIC | Age: 69
End: 2024-10-10
Payer: MEDICARE

## 2024-10-10 ENCOUNTER — PATIENT MESSAGE (OUTPATIENT)
Dept: ORTHOPEDICS | Facility: CLINIC | Age: 69
End: 2024-10-10
Payer: MEDICARE

## 2024-10-10 ENCOUNTER — TELEPHONE (OUTPATIENT)
Dept: PAIN MEDICINE | Facility: CLINIC | Age: 69
End: 2024-10-10
Payer: MEDICARE

## 2024-10-10 NOTE — TELEPHONE ENCOUNTER
Sent message to let patient know    ----- Message from Thais Maxwell MD sent at 10/9/2024  1:30 PM CDT -----  Ct unrevealing for symptoms - please let patient know.

## 2024-10-10 NOTE — TELEPHONE ENCOUNTER
Spoke to patient to get him scheduled with Pain Management in reference to Referral. He said that he already has an appointment with someone in Pain Management and to not call him back.

## 2024-10-14 ENCOUNTER — HOSPITAL ENCOUNTER (OUTPATIENT)
Dept: CARDIOLOGY | Facility: HOSPITAL | Age: 69
Discharge: HOME | End: 2024-10-14
Attending: STUDENT IN AN ORGANIZED HEALTH CARE EDUCATION/TRAINING PROGRAM
Payer: MEDICARE

## 2024-10-14 VITALS
SYSTOLIC BLOOD PRESSURE: 167 MMHG | HEART RATE: 53 BPM | WEIGHT: 205 LBS | BODY MASS INDEX: 27.77 KG/M2 | HEIGHT: 72 IN | DIASTOLIC BLOOD PRESSURE: 76 MMHG

## 2024-10-14 DIAGNOSIS — I50.22 CHRONIC HFREF (HEART FAILURE WITH REDUCED EJECTION FRACTION): ICD-10-CM

## 2024-10-14 DIAGNOSIS — R42 DIZZINESS: ICD-10-CM

## 2024-10-14 LAB
ASCENDING AORTA: 3.26 CM
AV AREA BY CONTINUOUS VTI: 0.8 CM2
AV INDEX (PROSTH): 0.2
AV LVOT MEAN GRADIENT: 2 MMHG
AV LVOT PEAK GRADIENT: 3 MMHG
AV MEAN GRADIENT: 47.3 MMHG
AV PEAK GRADIENT: 84.6 MMHG
AV REGURGITATION PRESSURE HALF TIME: 644.79 MS
AV VALVE AREA BY VELOCITY RATIO: 0.7 CM²
AV VALVE AREA: 0.7 CM2
AV VELOCITY RATIO: 0.2
BSA FOR ECHO PROCEDURE: 2.17 M2
CV ECHO LV RWT: 0.39 CM
DOP CALC AO PEAK VEL: 4.6 M/S
DOP CALC AO VTI: 123 CM
DOP CALC LVOT AREA: 3.8 CM2
DOP CALC LVOT DIAMETER: 2.2 CM
DOP CALC LVOT PEAK VEL: 0.9 M/S
DOP CALC LVOT STROKE VOLUME: 91.9 CM3
DOP CALC RVOT AREA: 5.1 CM2
DOP CALC RVOT DIAMETER: 2.55 CM
DOP CALCLVOT PEAK VEL VTI: 24.2 CM
E WAVE DECELERATION TIME: 459.63 MS
E/A RATIO: 0.63
E/E' RATIO: 7.5 M/S
ECHO EF ESTIMATED: 68 %
ECHO LV POSTERIOR WALL: 0.9 CM (ref 0.6–1.1)
FRACTIONAL SHORTENING: 37 % (ref 28–44)
HR MV ECHO: 53 BPM
INTERVENTRICULAR SEPTUM: 1.1 CM (ref 0.6–1.1)
IVC DIAMETER: 1.22 CM
IVRT: 125.59 MS
LA MAJOR: 6.1 CM
LA MINOR: 6.4 CM
LA WIDTH: 4.52 CM
LEFT ATRIUM SIZE: 3.82 CM
LEFT ATRIUM VOLUME INDEX MOD: 44.7 ML/M2
LEFT ATRIUM VOLUME INDEX: 42.6 ML/M2
LEFT ATRIUM VOLUME MOD: 96.07 ML
LEFT ATRIUM VOLUME: 91.67 CM3
LEFT INTERNAL DIMENSION IN SYSTOLE: 2.9 CM (ref 2.1–4)
LEFT VENTRICLE DIASTOLIC VOLUME INDEX: 46.04 ML/M2
LEFT VENTRICLE DIASTOLIC VOLUME: 98.99 ML
LEFT VENTRICLE MASS INDEX: 73.9 G/M2
LEFT VENTRICLE SYSTOLIC VOLUME INDEX: 14.5 ML/M2
LEFT VENTRICLE SYSTOLIC VOLUME: 31.28 ML
LEFT VENTRICULAR INTERNAL DIMENSION IN DIASTOLE: 4.6 CM (ref 3.5–6)
LEFT VENTRICULAR MASS: 158.8 G
LV LATERAL E/E' RATIO: 7.5
LV SEPTAL E/E' RATIO: 7.5
MV A" WAVE DURATION": 114.18 MS
MV PEAK A VEL: 0.71 M/S
MV PEAK E VEL: 0.45 M/S
MV VALVE AREA BY PLANIMETRY: 1.46 CM2
OHS CV RV/LV RATIO: 0.85 CM
PISA TR MAX VEL: 2.59 M/S
PULM VEIN A" WAVE DURATION": 114.18 MS
PULM VEIN S/D RATIO: 1.66
PULMONIC VEIN PEAK A VELOCITY: 0.3 M/S
PV PEAK D VEL: 0.35 M/S
PV PEAK S VEL: 0.58 M/S
RA MAJOR: 5.96 CM
RA PRESSURE ESTIMATED: 3 MMHG
RA WIDTH: 4.53 CM
RIGHT ATRIAL AREA: 19.8 CM2
RIGHT VENTRICLE DIASTOLIC BASEL DIMENSION: 3.9 CM
RV TB RVSP: 6 MMHG
RV TISSUE DOPPLER FREE WALL SYSTOLIC VELOCITY 1 (APICAL 4 CHAMBER VIEW): 15.91 CM/S
SINUS: 3.06 CM
STJ: 2.7 CM
TDI LATERAL: 0.06 M/S
TDI SEPTAL: 0.06 M/S
TDI: 0.06 M/S
TR MAX PG: 27 MMHG
TRICUSPID ANNULAR PLANE SYSTOLIC EXCURSION: 2.34 CM
TV PEAK GRADIENT: 27 MMHG
TV REST PULMONARY ARTERY PRESSURE: 30 MMHG
Z-SCORE OF LEFT VENTRICULAR DIMENSION IN END DIASTOLE: -4.17
Z-SCORE OF LEFT VENTRICULAR DIMENSION IN END SYSTOLE: -3.03

## 2024-10-14 PROCEDURE — 93306 TTE W/DOPPLER COMPLETE: CPT

## 2024-10-14 PROCEDURE — 93306 TTE W/DOPPLER COMPLETE: CPT | Mod: 26,,, | Performed by: INTERNAL MEDICINE

## 2024-10-17 ENCOUNTER — PATIENT MESSAGE (OUTPATIENT)
Dept: TRANSPLANT | Facility: CLINIC | Age: 69
End: 2024-10-17
Payer: MEDICARE

## 2024-11-06 ENCOUNTER — OFFICE VISIT (OUTPATIENT)
Dept: PSYCHIATRY | Facility: CLINIC | Age: 69
End: 2024-11-06
Payer: COMMERCIAL

## 2024-11-06 DIAGNOSIS — F33.41 RECURRENT MAJOR DEPRESSIVE DISORDER, IN PARTIAL REMISSION: ICD-10-CM

## 2024-11-06 PROCEDURE — 99999 PR PBB SHADOW E&M-EST. PATIENT-LVL I: CPT | Mod: PBBFAC,,,

## 2024-11-06 PROCEDURE — 3044F HG A1C LEVEL LT 7.0%: CPT | Mod: CPTII,S$GLB,,

## 2024-11-06 PROCEDURE — 90792 PSYCH DIAG EVAL W/MED SRVCS: CPT | Mod: S$GLB,,,

## 2024-11-06 PROCEDURE — 4010F ACE/ARB THERAPY RXD/TAKEN: CPT | Mod: CPTII,S$GLB,,

## 2024-11-06 NOTE — PROGRESS NOTES
Outpatient Psychiatry Initial Visit (MD/NP)    11/6/2024    Tej Purdy, a 69 y.o. male, presenting for initial evaluation visit. Met with patient.    Reason for Encounter: self-referral. Patient complains of depression, anxiety.    History of Present Illness: Depression  Patient complains of depression. He complains of depressed mood, difficulty concentrating, fatigue, and insomnia. Onset was approximately several  years  ago. Symptoms have been gradually improving since that time. Current symptoms include: depressed mood and insomnia. Patient denies suicidal attempt, suicidal thoughts with specific plan, and suicidal thoughts without plan. Family history significant for anxiety. Possible organic causes contributing are: none. Risk factors: previous episode of depression. Previous treatment includes individual therapy and medication. He complains of the following side effects from the treatment: none.    Patient presents today for establishment of care with a psychiatric provider. He has been hospitalized this past summer for approximately 3 to four days psychiatrically in [place]. Patient states he had a depressive episode and his medications were not helpful. He was started on Wellbutrin and he's doing much better with it than any significant depression as of late period patient states that his biggest issue is he feels the sand is running out of the hourglass and although his own parents lived to their 90s he does not know if he will do the same as he had a liver transplant during COVID-19 pandemic. Patient states of late he is doing much better however he has been more active he has been active with family which has improved his mood significantly he was also psychiatrically hospitalized decades ago. He had a birthday recently and states it was wonderful overall he feels he is doing better and does not need to be followed up continuously and is not particularly interested in therapy at this time. He  denies any significant issues from his current medication regimen, denies AH/VH/SI/HI, does not have a firearm at home.         CURRENT PSYCHOTROPIC REGIMEN:  Wellbutrin 150  Seroquel 100mg  Diazepam 5mg         Compliance: yes   reviewed without concern yes  Side effects: no  Patient's overall opinion of current regimen: Working well  Life event tracker/ stressors/ relationships:         PSYCHIATRIC ROS:  Depressed mood: some existential depression  Sleep Disturbance: no   interest/pleasure/anhedonia: no  Negative-self talk /guilty/hopelessness: no  energy/anergy: no  concentration: no  Appetite disturbance: no   Self-injurious /risky behavior: no  Psychomotor disturbance: no  Suicidal Ideation:  no  Chronic daily anxiety/panic: no   Denies: severe panic associated with chest pain, palpitations, hyperventilation, sense of doom, diaphoresis.   Obsessions/Recurrent thoughts:  denies  Compulsions/ Recurrent behaviors:  denies     RISK PARAMETERS:  Patient reports no suicidal ideation  Patient reports no homicidal ideation  Patient reports no self-injurious behavior  Patient reports no violent behavior       SOCIAL HISTORY:  Lives in own home with significant other  Work - retired  Relationship   Children - two children and a few grandchildren   Rastafari yes        PAST PSYCHIATRIC HISTORY:  Family Psychiatric History:  Denies family history of bipolar , schizophrenia ,, psychiatric hospitalizations , older brother took own life at 24  Previous Psychiatric Care: no  Previous Psychiatric Hospitalizations: yes, earlier this year for a depressive episode   Previous Suicide Attempts/ Non-suicidal self injury: no  Previous Medication Trials: no        LEGAL, VIOLENCE:  History of Violence: no  Legal history: no  DWI / DUI: no  Access to Gun: no        SUBSTANCE ABUSE HISTORY:  Smokes marijuana     NEUROLOGIC HISTORY:  Seizures: no  Head trauma: no      Review Of Systems:     GENERAL:  No weight gain or  loss  SKIN:  No rashes or lacerations  HEAD:  No headaches  EYES:  No exophthalmos, jaundice or blindness  EARS:  No dizziness, tinnitus or hearing loss  NOSE:  No changes in smell  MOUTH & THROAT:  No dyskinetic movements or obvious goiter  CHEST:  No shortness of breath, hyperventilation or cough  CARDIOVASCULAR:  No tachycardia or chest pain  ABDOMEN:  No nausea, vomiting, pain, constipation or diarrhea  URINARY:  No frequency, dysuria or sexual dysfunction  ENDOCRINE:  No polydipsia, polyuria  MUSCULOSKELETAL:  No pain or stiffness of the joints  NEUROLOGIC:  No weakness, sensory changes, seizures, confusion, memory loss, tremor or other abnormal movements    Current Evaluation:     Nutritional Screening: Considering the patient's height and weight, medications, medical history and preferences, should a referral be made to the dietitian? no    Constitutional  Vitals:  Most recent vital signs, dated less than 90 days prior to this appointment, were reviewed.    There were no vitals filed for this visit.     General:  unremarkable, age appropriate     Musculoskeletal  Muscle Strength/Tone:  no tremor, no tic   Gait & Station:  non-ataxic     Psychiatric  Speech:  no latency; no press   Mood & Affect:  steady, euthymic  congruent and appropriate   Thought Process:  normal and logical   Associations:  intact   Thought Content:  normal, no suicidality, no homicidality, delusions, or paranoia   Insight:  intact   Judgement: behavior is adequate to circumstances   Orientation:  grossly intact   Memory: intact for content of interview   Language: grossly intact   Attention Span & Concentration:  able to focus   Fund of Knowledge:  intact and appropriate to age and level of education       Relevant Elements of Neurological Exam: normal gait    Functioning in Relationships:  Spouse/partner: endorses great relationship   Peers: has some peer relationships  Employers: n/a    Laboratory Data  Hospital Outpatient Visit on  10/14/2024   Component Date Value Ref Range Status    LV Diastolic Volume 10/14/2024 98.99  mL Final    Echo EF Estimated 10/14/2024 68  % Final    LV Systolic Volume 10/14/2024 31.28  mL Final    IVS 10/14/2024 1.1  0.6 - 1.1 cm Final    LVIDd 10/14/2024 4.6  3.5 - 6.0 cm Final    LVIDs 10/14/2024 2.9  2.1 - 4.0 cm Final    LVOT diameter 10/14/2024 2.2  cm Final    PW 10/14/2024 0.9  0.6 - 1.1 cm Final    IVRT 10/14/2024 125.59  ms Final    AV LVOT peak gradient 10/14/2024 3  mmHg Final    LVOT mn grad 10/14/2024 2  mmHg Final    LVOT peak chandra 10/14/2024 0.9  m/s Final    LVOT peak VTI 10/14/2024 24.2  cm Final    RV- costello basal diam 10/14/2024 3.9  cm Final    RVOT prox diameter 10/14/2024 2.55  cm Final    RV S' 10/14/2024 15.91  cm/s Final    LA size 10/14/2024 3.82  cm Final    Left Atrium Major Axis 10/14/2024 6.10  cm Final    Left Atrium Minor Axis 10/14/2024 6.40  cm Final    LA Vol (MOD) 10/14/2024 96.07  mL Final    RA Major Axis 10/14/2024 5.96  cm Final    RA Area 10/14/2024 19.8  cm2 Final    AV valve area 10/14/2024 0.7  cm2 Final    AV area by cont VTI 10/14/2024 0.8  cm2 Final    AV regurgitation pressure 1/2 time 10/14/2024 644.79  ms Final    AV peak gradient 10/14/2024 84.6  mmHg Final    AV mean gradient 10/14/2024 47.3  mmHg Final    Ao peak chandra 10/14/2024 4.6  m/s Final    Ao VTI 10/14/2024 123.0  cm Final    MV Peak A Chandra 10/14/2024 0.71  m/s Final    E wave deceleration time 10/14/2024 459.63  ms Final    MV Peak E Chandra 10/14/2024 0.45  m/s Final    E/A ratio 10/14/2024 0.63   Final    LV LATERAL E/E' RATIO 10/14/2024 7.50   Final    LV SEPTAL E/E' RATIO 10/14/2024 7.50   Final    TDI LATERAL 10/14/2024 0.06  m/s Final    TDI SEPTAL 10/14/2024 0.06  m/s Final    MV valve area by planimetry 10/14/2024 1.46  cm2 Final    TV peak gradient 10/14/2024 27  mmHg Final    TR Max Chandra 10/14/2024 2.59  m/s Final    Ascending aorta 10/14/2024 3.26  cm Final    STJ 10/14/2024 2.70  cm Final    P vein A  "10/14/2024 0.3  m/s Final    MV "A" wave duration 10/14/2024 114.18  ms Final    Pulm vein "A" wave 10/14/2024 114.18  ms Final    PV Peak D Chandra 10/14/2024 0.35  m/s Final    PV Peak S Chandra 10/14/2024 0.58  m/s Final    IVC diameter 10/14/2024 1.22  cm Final    Sinus 10/14/2024 3.06  cm Final    RA Width 10/14/2024 4.53  cm Final    TAPSE 10/14/2024 2.34  cm Final    LA WIDTH 10/14/2024 4.52  cm Final    BSA 10/14/2024 2.17  m2 Final    LVOT stroke volume 10/14/2024 91.9  cm3 Final    LV Systolic Volume Index 10/14/2024 14.5  mL/m2 Final    LV Diastolic Volume Index 10/14/2024 46.04  mL/m2 Final    LVOT area 10/14/2024 3.8  cm2 Final    FS 10/14/2024 37.0  28 - 44 % Final    Left Ventricle Relative Wall Thick* 10/14/2024 0.39  cm Final    LV mass 10/14/2024 158.8  g Final    LV Mass Index 10/14/2024 73.9  g/m2 Final    E/E' ratio 10/14/2024 7.50  m/s Final    GLEN 10/14/2024 42.6  mL/m2 Final    LA Vol 10/14/2024 91.67  cm3 Final    Pulm vein S/D ratio 10/14/2024 1.66   Final    RVOT area 10/14/2024 5.10  cm2 Final    RV/LV Ratio 10/14/2024 0.85  cm Final    GLEN (MOD) 10/14/2024 44.7  mL/m2 Final    AV Velocity Ratio 10/14/2024 0.20   Final    AV index (prosthetic) 10/14/2024 0.20   Final    CHRIS by Velocity Ratio 10/14/2024 0.7  cm² Final    Triscuspid Valve Regurgitation Pea* 10/14/2024 27  mmHg Final    Mean e' 10/14/2024 0.06  m/s Final    ZLVIDS 10/14/2024 -3.03   Final    ZLVIDD 10/14/2024 -4.17   Final    Mitral Valve Heart Rate 10/14/2024 53  bpm Final    TV resting pulmonary artery pressu* 10/14/2024 30  mmHg Final    RV TB RVSP 10/14/2024 6  mmHg Final    Est. RA pres 10/14/2024 3  mmHg Final         Medications  Outpatient Encounter Medications as of 11/6/2024   Medication Sig Dispense Refill    apixaban (ELIQUIS) 5 mg Tab Take 1 tablet (5 mg total) by mouth 2 (two) times daily. 180 tablet 3    atorvastatin (LIPITOR) 80 MG tablet Take 1 tablet (80 mg total) by mouth once daily. 90 tablet 3    blood sugar " diagnostic Strp To check BG 2 times daily, to use with insurance preferred meter (patient has a [employer] self-monitoring glucose meter) 100 strip 11    blood-glucose meter kit To check BG 2 times daily, to use with insurance preferred meter 1 each 0    buPROPion (WELLBUTRIN XL) 150 MG TB24 tablet Take 1 tablet (150 mg total) by mouth every morning. 90 tablet 1    carvediloL (COREG) 12.5 MG tablet Take 1 tablet (12.5 mg total) by mouth 2 (two) times daily with meals. 180 tablet 3    clopidogreL (PLAVIX) 75 mg tablet Take 1 tablet (75 mg total) by mouth once daily. 90 tablet 3    diazePAM (VALIUM) 5 MG tablet Take 1 tablet (5 mg total) by mouth as needed for Anxiety. Take 1 tab 30 min prior to MRI, take 2nd as needed 2 tablet 0    empagliflozin (JARDIANCE) 10 mg tablet Take 1 tablet (10 mg total) by mouth once daily. 90 tablet 3    ferrous sulfate (IRON) 325 mg (65 mg iron) Tab tablet Take 1 tablet (325 mg total) by mouth once daily. 30 tablet 2    furosemide (LASIX) 20 MG tablet Take 1 tablet (20 mg total) by mouth once daily. 90 tablet 3    HYDROcodone-acetaminophen (NORCO) 5-325 mg per tablet Take 1 tablet by mouth every 8 (eight) hours as needed for Pain. 15 tablet 0    insulin glargine U-100, Lantus, (LANTUS SOLOSTAR U-100 INSULIN) 100 unit/mL (3 mL) InPn pen Inject 22 Units into the skin every evening. 15 mL 11    insulin lispro (HUMALOG KWIKPEN INSULIN) 100 unit/mL pen Inject 6 Units into the skin 3 (three) times daily with meals. Plus sliding scale, MDD: 48 units 15 mL 5    lancets Misc To check BG 2 times daily, to use with insurance preferred meter 100 each 11    magnesium oxide (MAG-OX) 400 mg (241.3 mg magnesium) tablet Take 1 tablet (400 mg total) by mouth 2 (two) times daily. 60 tablet 11    multivitamin capsule Take 1 capsule by mouth once daily.      omega-3 fatty acids/fish oil (FISH OIL-OMEGA-3 FATTY ACIDS) 300-1,000 mg capsule Take 1 capsule by mouth once daily.       pantoprazole (PROTONIX) 40 MG  "tablet Take 1 tablet (40 mg total) by mouth once daily. 90 tablet 1    pen needle, diabetic (BD ULTRA-FINE GÉNESIS PEN NEEDLE) 32 gauge x 5/32" Ndle Use to inject insulin 4 times daily 200 each 11    pregabalin (LYRICA) 75 MG capsule Take 1 capsule (75 mg total) by mouth 2 (two) times daily. 60 capsule 0    QUEtiapine (SEROQUEL) 100 MG Tab Take 1 tablet (100 mg total) by mouth every evening. 90 tablet 1    sacubitriL-valsartan (ENTRESTO) 49-51 mg per tablet Take 1 tablet by mouth 2 (two) times daily. 180 tablet 3    tacrolimus (PROGRAF) 0.5 MG Cap Take 2 capsules (1 mg total) by mouth every morning AND 1 capsule (0.5 mg total) every evening. 90 capsule 11     No facility-administered encounter medications on file as of 11/6/2024.           Assessment - Diagnosis - Goals:     Impression:   1. Recurrent major depressive disorder, in partial remission  Ambulatory referral/consult to Psychiatry            Strengths and Liabilities: Strength: Patient accepts guidance/feedback, Strength: Patient is expressive/articulate., Strength: Patient is intelligent., Strength: Patient is motivated for change., Strength: Patient has positive support network., Strength: Patient has reasonable judgment., Liability: Patient lacks coping skills.    Treatment Goals:  Specify outcomes written in observable, behavioral terms:   Depression: acquiring relapse prevention skills, increasing energy, increasing interest in usual activities, and increasing motivation    Treatment Plan/Recommendations:   Medication Management: Continue current medications. The risks and benefits of medication were discussed with the patient.  The treatment plan and follow up plan were reviewed with the patient.      Labs: reviewed most recent. The treatment plan and follow up plan were reviewed with the patient. Discussed with patient informed consent, risks vs. benefits, alternative treatments, side effect profile and the inherent unpredictability of individual " responses to these treatments. The patient expresses understanding of the above and displays the capacity to agree with this current plan and had no other questions. Encouraged Patient to keep future appointments. Take medications as prescribed and abstain from substance abuse. In the event of an emergency patient was advised to go to the emergency room.    Return to Clinic: as scheduled, as needed    Total time: I spent a total of 90 minutes on the day of the visit.  This includes face to face time and non-face to face time preparing to see the patient (eg, review of tests), obtaining and/or reviewing separately obtained history, documenting clinical information in the electronic or other health record, independently interpreting results and communicating results to the patient/family/caregiver, or care coordinator.    Consulting clinician was informed of the encounter and consult note.

## 2024-11-14 ENCOUNTER — OFFICE VISIT (OUTPATIENT)
Dept: CARDIOLOGY | Facility: CLINIC | Age: 69
End: 2024-11-14
Payer: MEDICARE

## 2024-11-14 VITALS
WEIGHT: 205 LBS | HEIGHT: 72 IN | HEART RATE: 59 BPM | SYSTOLIC BLOOD PRESSURE: 141 MMHG | OXYGEN SATURATION: 97 % | BODY MASS INDEX: 27.77 KG/M2 | DIASTOLIC BLOOD PRESSURE: 58 MMHG

## 2024-11-14 DIAGNOSIS — I50.22 CHRONIC HFREF (HEART FAILURE WITH REDUCED EJECTION FRACTION): ICD-10-CM

## 2024-11-14 DIAGNOSIS — I48.19 PERSISTENT ATRIAL FIBRILLATION: Chronic | ICD-10-CM

## 2024-11-14 DIAGNOSIS — E11.9 TYPE 2 DIABETES MELLITUS WITHOUT COMPLICATION, WITH LONG-TERM CURRENT USE OF INSULIN: Chronic | ICD-10-CM

## 2024-11-14 DIAGNOSIS — Z98.890 S/P BALLOON AORTIC VALVULOPLASTY: ICD-10-CM

## 2024-11-14 DIAGNOSIS — Z79.4 TYPE 2 DIABETES MELLITUS WITHOUT COMPLICATION, WITH LONG-TERM CURRENT USE OF INSULIN: Chronic | ICD-10-CM

## 2024-11-14 DIAGNOSIS — R42 DIZZINESS: ICD-10-CM

## 2024-11-14 DIAGNOSIS — I25.10 CORONARY ARTERY DISEASE INVOLVING NATIVE CORONARY ARTERY OF NATIVE HEART WITHOUT ANGINA PECTORIS: ICD-10-CM

## 2024-11-14 DIAGNOSIS — I35.0 SEVERE AORTIC STENOSIS: Primary | ICD-10-CM

## 2024-11-14 PROCEDURE — 3044F HG A1C LEVEL LT 7.0%: CPT | Mod: CPTII,S$GLB,, | Performed by: INTERNAL MEDICINE

## 2024-11-14 PROCEDURE — 1160F RVW MEDS BY RX/DR IN RCRD: CPT | Mod: CPTII,S$GLB,, | Performed by: INTERNAL MEDICINE

## 2024-11-14 PROCEDURE — 3078F DIAST BP <80 MM HG: CPT | Mod: CPTII,S$GLB,, | Performed by: INTERNAL MEDICINE

## 2024-11-14 PROCEDURE — 99999 PR PBB SHADOW E&M-EST. PATIENT-LVL V: CPT | Mod: PBBFAC,,, | Performed by: INTERNAL MEDICINE

## 2024-11-14 PROCEDURE — 1100F PTFALLS ASSESS-DOCD GE2>/YR: CPT | Mod: CPTII,S$GLB,, | Performed by: INTERNAL MEDICINE

## 2024-11-14 PROCEDURE — 3074F SYST BP LT 130 MM HG: CPT | Mod: CPTII,S$GLB,, | Performed by: INTERNAL MEDICINE

## 2024-11-14 PROCEDURE — 99214 OFFICE O/P EST MOD 30 MIN: CPT | Mod: S$GLB,,, | Performed by: INTERNAL MEDICINE

## 2024-11-14 PROCEDURE — 1159F MED LIST DOCD IN RCRD: CPT | Mod: CPTII,S$GLB,, | Performed by: INTERNAL MEDICINE

## 2024-11-14 PROCEDURE — 4010F ACE/ARB THERAPY RXD/TAKEN: CPT | Mod: CPTII,S$GLB,, | Performed by: INTERNAL MEDICINE

## 2024-11-14 PROCEDURE — 3288F FALL RISK ASSESSMENT DOCD: CPT | Mod: CPTII,S$GLB,, | Performed by: INTERNAL MEDICINE

## 2024-11-14 PROCEDURE — 3008F BODY MASS INDEX DOCD: CPT | Mod: CPTII,S$GLB,, | Performed by: INTERNAL MEDICINE

## 2024-11-14 PROCEDURE — 1125F AMNT PAIN NOTED PAIN PRSNT: CPT | Mod: CPTII,S$GLB,, | Performed by: INTERNAL MEDICINE

## 2024-11-14 NOTE — ASSESSMENT & PLAN NOTE
- patient has completed recommended plavix regimen following [facility] 12/2023  - will stop plavix  - continue only with eliquis for a fib

## 2024-11-14 NOTE — ASSESSMENT & PLAN NOTE
- hx of HFrEF with LVEF 20-25%  - optimized on GDMT. Entresto, jardiance, beta-blocker  - recent echo showing recovered EF 60-65%  - continue GDMT as is and lasix prn

## 2024-11-14 NOTE — ASSESSMENT & PLAN NOTE
- asymptomatic severe AS at this time  - s/p BAV 2/1/24 by Dr. Nugent. At the time of BAV, patient was admitted for acute on chronic HF exacerbation with EF 20  - Recent TTE 10/14/24 shows recovered EF of 60-65% with high-gradient severe AS-- CHRIS 0.7, MG 47.3, peak parker 4.6  - Will get CTA done and surgical consult to complete TAVR workup  - follow up in about 2 months, after CTA

## 2024-11-14 NOTE — PROGRESS NOTES
Interventional Cardiology Clinic Note    PCP: Dr. Sharpe  General Cardiologist: Dr. Estevez  EP: Dr. Tilley    HPI:     Tej Purdy is a 69 y.o. male with HTN, HLD, T2DM on insulin, paroxysmal Afib on eliquis, PAD, hx HCC s/p liver transplant 1/2022 and RFA/TACE 12/20, HFrEF now recovered to 60-65%, severe AS s/p BAV 2/1/24 who presents for follow-up after BAV.    Pt was admitted on Jan 2024 for acute hypoxic respiratory failure in the setting of acute on chronic HF exacerbation. During admission, he was optimized on GDMT for new LVEF of 20-25% and underwent BAV 2/1/24 for severe AS. This is his first follow-up visit following BAV.    Patient reports having significant symptom improvement and more energy after BAV and GDMT optimization. Recent TTE 10/14/24 shows recovered EF of 60-65% with high-gradient severe AS-- CHRIS 0.7, MG 47.3, peak parker 4.6. He endorses being able to walk around with use of his cane pretty well. No symptoms of dyspnea on exertion or angina. He denies leg swelling, orthopnea, pnd, syncope. He is still on plavix from stent placement in 12/2024. On eliquis for afib.     ROS:      Review of Systems   Cardiovascular:  Negative for chest pain, dyspnea on exertion, irregular heartbeat, leg swelling, near-syncope, orthopnea, palpitations, paroxysmal nocturnal dyspnea and syncope.   Respiratory:  Negative for shortness of breath.        PMH:     Past Medical History:   Diagnosis Date    Acute appendicitis 06/02/2023    Aortic stenosis     Atrial fibrillation     johny/cardioversion on 1/26/24    Calculus of ureter 12/22/2023    CHF (congestive heart failure)     Cholangiocarcinoma 03/16/2022    s/p liver transplant    Cirrhosis 11/23/2020    s/p transplant 2022 for CASTILLO cirrhosis and cholangiocarcinoma    Diabetes mellitus, type 2     c/b Diabetic neuropathy    HTN (hypertension) 11/23/2020    Hyperlipidemia 11/23/2020    Kidney stones 11/23/2020    S/p lithotripsy 2020    PAD (peripheral artery  disease)     Skin cancer 11/23/2020     Past Surgical History:   Procedure Laterality Date    ANGIOGRAM, CORONARY, WITH LEFT HEART CATHETERIZATION N/A 12/26/2023    Procedure: Angiogram, Coronary, with Left Heart Cath;  Surgeon: Carlos King MD;  Location: [facility] CATH LAB;  Service: Cardiology;  Laterality: N/A;    AORTIC VALVULOPLASTY N/A 2/1/2024    Procedure: REPAIR, AORTIC VALVE;  Surgeon: Shilo Carrasco MD;  Location: [facility] CATH LAB;  Service: Cardiology;  Laterality: N/A;    COLONOSCOPY  10/2020    ECHOCARDIOGRAM,TRANSESOPHAGEAL N/A 1/26/2024    Procedure: Transesophageal echo (ASHISH) intra-procedure log documentation;  Surgeon: Nick Mathur III, MD;  Location: [facility] EP LAB;  Service: Cardiology;  Laterality: N/A;    ESOPHAGEAL MANOMETRY WITH MEASUREMENT OF IMPEDANCE N/A 7/17/2023    Procedure: MANOMETRY, ESOPHAGUS, WITH IMPEDANCE MEASUREMENT;  Surgeon: Klarissa Zamora MD;  [facility]  Service: Gastroenterology;  Laterality: N/A;  pt diabetic  liver txp  prep insructions sent to pt via email and portal -    ESOPHAGOGASTRODUODENOSCOPY  10/2020    ESOPHAGOGASTRODUODENOSCOPY N/A 7/1/2021    Procedure: EGD (ESOPHAGOGASTRODUODENOSCOPY);  Surgeon: Jovan David MD;  Location: [facility] ENDO (4TH FLR);  Service: Endoscopy;  Laterality: N/A;  HCC. Listed for liver transplant. EGD for variceal surveillance.  cardiac clearance and blood thinenr approval received, see telephone encounter 6/23/21-BB  fully vaccinated-BB  labs same day of procedure-BB    EXCISION OF HYDROCELE Right     hemorroid surgery      hemorroidectomy      KIDNEY STONE SURGERY      LAPAROSCOPIC APPENDECTOMY N/A 6/2/2023    Procedure: APPENDECTOMY, LAPAROSCOPIC;  Surgeon: Shan Ross MD;  Location: [facility] OR [facility] FLR;  Service: General;  Laterality: N/A;    LEFT HEART CATHETERIZATION N/A 1/27/2021    Procedure: Left heart cath;  Surgeon: Kris Shelton MD;  Location: [facility] CATH LAB;  Service: Cardiology;   Laterality: N/A;    liver mass removal      LIVER TRANSPLANT N/A 1/6/2022    Procedure: TRANSPLANT, LIVER;  Surgeon: Lizet Garcia MD;  Location: [facility] OR [facility] FLR;  Service: Transplant;  Laterality: N/A;    RIGHT HEART CATHETERIZATION Right 5/7/2021    Procedure: INSERTION, CATHETER, RIGHT HEART;  Surgeon: Jaden Schuster MD;  Location: [facility] CATH LAB;  Service: Cardiology;  Laterality: Right;    STENT, DRUG ELUTING, SINGLE VESSEL, CORONARY  12/26/2023    Procedure: Stent, Drug Eluting, Single Vessel, Coronary;  Surgeon: Carlos King MD;  Location: [facility] CATH LAB;  Service: Cardiology;;    TREATMENT OF CARDIAC ARRHYTHMIA N/A 5/19/2021    Procedure: CARDIOVERSION;  Surgeon: Didier Tilley MD;  Location: [facility] EP LAB;  Service: Cardiology;  Laterality: N/A;  a fib, dccv/johny, mac, dm. sscu    TREATMENT OF CARDIAC ARRHYTHMIA N/A 5/31/2021    Procedure: CARDIOVERSION;  Surgeon: Daniel Arambula MD;  Location: [facility] EP LAB;  Service: Cardiology;  Laterality: N/A;  afib, DCCV, anest., DM, 3 prep    TREATMENT OF CARDIAC ARRHYTHMIA N/A 7/7/2021    Procedure: Cardioversion or Defibrillation;  Surgeon: Didier Tilley MD;  Location: [facility] EP LAB;  Service: Cardiology;  Laterality: N/A;  AF, JOHNY (cancel if complaint), DCCV, MAC, DM, 3 Prep    TREATMENT OF CARDIAC ARRHYTHMIA N/A 7/27/2021    Procedure: Cardioversion or Defibrillation;  Surgeon: Didier Tilley MD;  Location: [facility] EP LAB;  Service: Cardiology;  Laterality: N/A;  AF, JOHNY (cx if complaint), DCCV, MAC, DM, 3 Prep    TREATMENT OF CARDIAC ARRHYTHMIA N/A 1/26/2024    Procedure: Cardioversion or Defibrillation;  Surgeon: Koko Knight MD;  Location: [facility] EP LAB;  Service: Cardiology;  Laterality: N/A;  AF, JOHNY, DCCV, MAC, DM, 3 Prep *ADENOSINE TEST* *LIVER TRANSPLANT PATIENT*     Allergies:     Review of patient's allergies indicates:   Allergen Reactions    Bee pollens Swelling     BEE STINGS swells body     Medications:     Current Outpatient Medications on  File Prior to Visit   Medication Sig Dispense Refill    apixaban (ELIQUIS) 5 mg Tab Take 1 tablet (5 mg total) by mouth 2 (two) times daily. 180 tablet 3    atorvastatin (LIPITOR) 80 MG tablet Take 1 tablet (80 mg total) by mouth once daily. 90 tablet 3    blood sugar diagnostic Strp To check BG 2 times daily, to use with insurance preferred meter (patient has a [employer] self-monitoring glucose meter) 100 strip 11    blood-glucose meter kit To check BG 2 times daily, to use with insurance preferred meter 1 each 0    buPROPion (WELLBUTRIN XL) 150 MG TB24 tablet Take 1 tablet (150 mg total) by mouth every morning. 90 tablet 1    carvediloL (COREG) 12.5 MG tablet Take 1 tablet (12.5 mg total) by mouth 2 (two) times daily with meals. 180 tablet 3    clopidogreL (PLAVIX) 75 mg tablet Take 1 tablet (75 mg total) by mouth once daily. 90 tablet 3    diazePAM (VALIUM) 5 MG tablet Take 1 tablet (5 mg total) by mouth as needed for Anxiety. Take 1 tab 30 min prior to MRI, take 2nd as needed 2 tablet 0    empagliflozin (JARDIANCE) 10 mg tablet Take 1 tablet (10 mg total) by mouth once daily. 90 tablet 3    ferrous sulfate (IRON) 325 mg (65 mg iron) Tab tablet Take 1 tablet (325 mg total) by mouth once daily. 30 tablet 2    furosemide (LASIX) 20 MG tablet Take 1 tablet (20 mg total) by mouth once daily. 90 tablet 3    HYDROcodone-acetaminophen (NORCO) 5-325 mg per tablet Take 1 tablet by mouth every 8 (eight) hours as needed for Pain. 15 tablet 0    insulin glargine U-100, Lantus, (LANTUS SOLOSTAR U-100 INSULIN) 100 unit/mL (3 mL) InPn pen Inject 22 Units into the skin every evening. 15 mL 11    insulin lispro (HUMALOG KWIKPEN INSULIN) 100 unit/mL pen Inject 6 Units into the skin 3 (three) times daily with meals. Plus sliding scale, MDD: 48 units 15 mL 5    lancets Misc To check BG 2 times daily, to use with insurance preferred meter 100 each 11    magnesium oxide (MAG-OX) 400 mg (241.3 mg magnesium) tablet Take 1 tablet (400  "mg total) by mouth 2 (two) times daily. 60 tablet 11    multivitamin capsule Take 1 capsule by mouth once daily.      omega-3 fatty acids/fish oil (FISH OIL-OMEGA-3 FATTY ACIDS) 300-1,000 mg capsule Take 1 capsule by mouth once daily.       pantoprazole (PROTONIX) 40 MG tablet Take 1 tablet (40 mg total) by mouth once daily. 90 tablet 1    pen needle, diabetic (BD ULTRA-FINE GÉNESIS PEN NEEDLE) 32 gauge x 5/32" Ndle Use to inject insulin 4 times daily 200 each 11    pregabalin (LYRICA) 75 MG capsule Take 1 capsule (75 mg total) by mouth 2 (two) times daily. 60 capsule 0    QUEtiapine (SEROQUEL) 100 MG Tab Take 1 tablet (100 mg total) by mouth every evening. 90 tablet 1    sacubitriL-valsartan (ENTRESTO) 49-51 mg per tablet Take 1 tablet by mouth 2 (two) times daily. 180 tablet 3    tacrolimus (PROGRAF) 0.5 MG Cap Take 2 capsules (1 mg total) by mouth every morning AND 1 capsule (0.5 mg total) every evening. 90 capsule 11     No current facility-administered medications on file prior to visit.     Social History:     Social History     Tobacco Use    Smoking status: Former     Current packs/day: 0.00     Types: Cigarettes     Quit date: 1980     Years since quittin.8    Smokeless tobacco: Former   Substance Use Topics    Alcohol use: Not Currently     Family History:     Family History   Problem Relation Name Age of Onset    Coronary artery disease Father      Colon cancer Neg Hx      Esophageal cancer Neg Hx       Physical Exam:   BP (!) 141/58 (BP Location: Right arm, Patient Position: Sitting)   Pulse (!) 59   Ht 6' (1.829 m)   Wt 93 kg (205 lb 0.4 oz)   SpO2 97%   BMI 27.81 kg/m²        Physical Exam  Vitals and nursing note reviewed.   Constitutional:       General: He is not in acute distress.     Appearance: Normal appearance. He is not ill-appearing or toxic-appearing.   HENT:      Head: Normocephalic and atraumatic.   Eyes:      Pupils: Pupils are equal, round, and reactive to light. "   Cardiovascular:      Rate and Rhythm: Normal rate and regular rhythm.      Pulses: Normal pulses.      Heart sounds: Murmur heard.      Systolic murmur is present with a grade of 3/6.      No friction rub. No gallop.      Comments: Holosystolic best at upper right sternal border, radiating to the carotids.  Pulmonary:      Effort: Pulmonary effort is normal. No respiratory distress.   Musculoskeletal:         General: No swelling. Normal range of motion.      Cervical back: Normal range of motion.   Skin:     General: Skin is warm and dry.      Capillary Refill: Capillary refill takes less than 2 seconds.   Neurological:      General: No focal deficit present.      Mental Status: He is alert.          Labs:     Lab Results   Component Value Date     09/30/2024    K 4.8 09/30/2024     09/30/2024    CO2 24 09/30/2024    BUN 25 (H) 09/30/2024    CREATININE 1.4 09/30/2024    GLUCOSE 125 (H) 08/23/2023    ANIONGAP 6 (L) 09/30/2024     Lab Results   Component Value Date    HGBA1C 5.1 09/18/2024     Lab Results   Component Value Date     (H) 02/28/2024     (H) 02/16/2024     (H) 02/09/2024    Lab Results   Component Value Date    WBC 7.27 09/30/2024    HGB 12.4 (L) 09/30/2024    HCT 40.1 09/30/2024    HCT 39 08/25/2024    PLT 92 (L) 09/30/2024    GRAN 5.1 09/30/2024    GRAN 70.7 09/30/2024     Lab Results   Component Value Date    CHOL 89 (L) 09/18/2024    HDL 26 (L) 09/18/2024    LDLCALC 36.2 (L) 09/18/2024    TRIG 134 09/18/2024          Lipids:  Recent Labs   Lab 09/18/24  1000   LDL Cholesterol 36.2 L   HDL 26 L      Renal:  Recent Labs   Lab 09/30/24  1048   Creatinine 1.4   Potassium 4.8   CO2 24   BUN 25 H     Liver:  Recent Labs   Lab 09/30/24  1048   AST 18   ALT 15       Imaging:     TTE (10/14/2024):     Left Ventricle: The left ventricle is normal in size. Normal wall thickness. There is normal systolic function with a visually estimated ejection fraction of 60 - 65%.    Right  Ventricle: Normal right ventricular cavity size. Wall thickness is normal. Systolic function is normal.    Left Atrium: Left atrium is mildly dilated.    Aortic Valve: There is severe aortic valve sclerosis. Severely restricted motion. There is severe stenosis. Aortic valve area by VTI is 0.7 cm2. Aortic valve peak velocity is 4.6 m/s. Mean gradient is 47.3 mmHg. The dimensionless index is 0.20. There is mild aortic regurgitation.    Mitral Valve: There is mild regurgitation.    Pulmonary Artery: No pulmonary hypertension. The estimated pulmonary artery systolic pressure is 30 mmHg.    IVC/SVC: Normal venous pressure at 3 mmHg.      Caths:   [facility] by Dr King (12/26/2023):    The 1st Mrg lesion was 90% stenosed with 0% stenosis post-intervention.    1st Mrg lesion: A STENT SYNERGY XD 3.0X24MM .    There was single vessel coronary artery disease.    The PCI was successful.      Assessment & Plan:     1. Severe aortic stenosis    2. S/P balloon aortic valvuloplasty    3. Type 2 diabetes mellitus without complication, with long-term current use of insulin    4. Chronic HFrEF (heart failure with reduced ejection fraction)    5. Dizziness    6. Coronary artery disease involving native coronary artery of native heart without angina pectoris    7. Persistent atrial fibrillation        Severe aortic stenosis  - asymptomatic severe AS at this time  - s/p BAV 2/1/24 by Dr. Nugent. At the time of BAV, patient was admitted for acute on chronic HF exacerbation with EF 20  - Recent TTE 10/14/24 shows recovered EF of 60-65% with high-gradient severe AS-- CHRIS 0.7, MG 47.3, peak parker 4.6  - Will get CTA done and surgical consult to complete TAVR workup  - follow up in about 2 months, after CTA    S/P balloon aortic valvuloplasty  - BAV 2/1/24, following up today    Type 2 diabetes mellitus, with long-term current use of insulin  - most recent A1C 5.1, stable  - continue current diabetic regimen    Chronic HFrEF (heart failure with  reduced ejection fraction)  - hx of HFrEF with LVEF 20-25%  - optimized on GDMT. Entresto, jardiance, beta-blocker  - recent echo showing recovered EF 60-65%  - continue GDMT as is and lasix prn    Coronary artery disease involving native coronary artery of native heart without angina pectoris  - patient has completed recommended plavix regimen following [facility] 12/2023  - will stop plavix  - continue only with eliquis for a fib    Persistent atrial fibrillation  - continue eliquis as is    Follow up in 2 months after CTA.    Signed:  Aileen Moralez PA-C  Interventional Cardiology

## 2024-11-15 NOTE — ASSESSMENT & PLAN NOTE
BG goal 140 - 180     - Levemir 28 units qd  - Start Novolog 4-8 TIDWM (Weight-based at 0.5 units/kg/day; started due prandial excursions).   - Start Novolog MDC (150/25) prn for BG excursions.   - BG checks AC/HS    ** Please call Endocrine for any BG related issues **  ** Please notify Endocrine for any change and/or advance in diet**  Lab Results   Component Value Date    HGBA1C 5.8 (H) 01/05/2022       Discharge planning: TBD         no

## 2024-11-18 ENCOUNTER — OFFICE VISIT (OUTPATIENT)
Dept: PRIMARY CARE CLINIC | Facility: CLINIC | Age: 69
End: 2024-11-18
Payer: MEDICARE

## 2024-11-18 ENCOUNTER — LAB VISIT (OUTPATIENT)
Dept: LAB | Facility: HOSPITAL | Age: 69
End: 2024-11-18
Attending: FAMILY MEDICINE
Payer: MEDICARE

## 2024-11-18 VITALS
BODY MASS INDEX: 29.35 KG/M2 | WEIGHT: 216.69 LBS | DIASTOLIC BLOOD PRESSURE: 74 MMHG | OXYGEN SATURATION: 96 % | SYSTOLIC BLOOD PRESSURE: 118 MMHG | HEART RATE: 62 BPM | HEIGHT: 72 IN | RESPIRATION RATE: 18 BRPM

## 2024-11-18 DIAGNOSIS — M25.571 CHRONIC PAIN OF RIGHT ANKLE: ICD-10-CM

## 2024-11-18 DIAGNOSIS — M25.571 CHRONIC PAIN OF RIGHT ANKLE: Primary | ICD-10-CM

## 2024-11-18 DIAGNOSIS — E11.21 TYPE 2 DIABETES MELLITUS WITH DIABETIC NEPHROPATHY, WITH LONG-TERM CURRENT USE OF INSULIN: Chronic | ICD-10-CM

## 2024-11-18 DIAGNOSIS — G89.29 CHRONIC PAIN OF RIGHT ANKLE: Primary | ICD-10-CM

## 2024-11-18 DIAGNOSIS — I50.22 CHRONIC HFREF (HEART FAILURE WITH REDUCED EJECTION FRACTION): ICD-10-CM

## 2024-11-18 DIAGNOSIS — G89.29 CHRONIC PAIN OF RIGHT ANKLE: ICD-10-CM

## 2024-11-18 DIAGNOSIS — Z79.4 TYPE 2 DIABETES MELLITUS WITH DIABETIC NEPHROPATHY, WITH LONG-TERM CURRENT USE OF INSULIN: Chronic | ICD-10-CM

## 2024-11-18 LAB — URATE SERPL-MCNC: 5.8 MG/DL (ref 3.4–7)

## 2024-11-18 PROCEDURE — 99214 OFFICE O/P EST MOD 30 MIN: CPT | Mod: S$GLB,,, | Performed by: FAMILY MEDICINE

## 2024-11-18 PROCEDURE — 1125F AMNT PAIN NOTED PAIN PRSNT: CPT | Mod: CPTII,S$GLB,, | Performed by: FAMILY MEDICINE

## 2024-11-18 PROCEDURE — 3078F DIAST BP <80 MM HG: CPT | Mod: CPTII,S$GLB,, | Performed by: FAMILY MEDICINE

## 2024-11-18 PROCEDURE — 3288F FALL RISK ASSESSMENT DOCD: CPT | Mod: CPTII,S$GLB,, | Performed by: FAMILY MEDICINE

## 2024-11-18 PROCEDURE — 3044F HG A1C LEVEL LT 7.0%: CPT | Mod: CPTII,S$GLB,, | Performed by: FAMILY MEDICINE

## 2024-11-18 PROCEDURE — 1159F MED LIST DOCD IN RCRD: CPT | Mod: CPTII,S$GLB,, | Performed by: FAMILY MEDICINE

## 2024-11-18 PROCEDURE — 1101F PT FALLS ASSESS-DOCD LE1/YR: CPT | Mod: CPTII,S$GLB,, | Performed by: FAMILY MEDICINE

## 2024-11-18 PROCEDURE — 99999 PR PBB SHADOW E&M-EST. PATIENT-LVL V: CPT | Mod: PBBFAC,,, | Performed by: FAMILY MEDICINE

## 2024-11-18 PROCEDURE — 3008F BODY MASS INDEX DOCD: CPT | Mod: CPTII,S$GLB,, | Performed by: FAMILY MEDICINE

## 2024-11-18 PROCEDURE — 4010F ACE/ARB THERAPY RXD/TAKEN: CPT | Mod: CPTII,S$GLB,, | Performed by: FAMILY MEDICINE

## 2024-11-18 PROCEDURE — 36415 COLL VENOUS BLD VENIPUNCTURE: CPT | Mod: PN | Performed by: FAMILY MEDICINE

## 2024-11-18 PROCEDURE — 1160F RVW MEDS BY RX/DR IN RCRD: CPT | Mod: CPTII,S$GLB,, | Performed by: FAMILY MEDICINE

## 2024-11-18 PROCEDURE — 84550 ASSAY OF BLOOD/URIC ACID: CPT | Performed by: FAMILY MEDICINE

## 2024-11-18 PROCEDURE — 3074F SYST BP LT 130 MM HG: CPT | Mod: CPTII,S$GLB,, | Performed by: FAMILY MEDICINE

## 2024-11-18 RX ORDER — HYDROCODONE BITARTRATE AND ACETAMINOPHEN 5; 325 MG/1; MG/1
1 TABLET ORAL EVERY 8 HOURS PRN
Qty: 12 TABLET | Refills: 0 | Status: SHIPPED | OUTPATIENT
Start: 2024-11-18

## 2024-11-18 RX ORDER — METHYLPREDNISOLONE 4 MG/1
TABLET ORAL
Qty: 21 TABLET | Refills: 0 | Status: SHIPPED | OUTPATIENT
Start: 2024-11-18 | End: 2024-12-09
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
## 2024-11-25 DIAGNOSIS — S92.024A CLOSED NONDISPLACED FRACTURE OF ANTERIOR PROCESS OF RIGHT CALCANEUS, INITIAL ENCOUNTER: Primary | ICD-10-CM

## 2024-11-26 RX ORDER — PREGABALIN 75 MG/1
75 CAPSULE ORAL 2 TIMES DAILY
Qty: 60 CAPSULE | Refills: 0 | Status: SHIPPED | OUTPATIENT
Start: 2024-11-26 | End: 2024-12-29

## 2024-12-16 ENCOUNTER — HOSPITAL ENCOUNTER (OUTPATIENT)
Dept: RADIOLOGY | Facility: HOSPITAL | Age: 69
Discharge: HOME | End: 2024-12-16
Attending: INTERNAL MEDICINE
Payer: MEDICARE

## 2024-12-16 ENCOUNTER — TELEPHONE (OUTPATIENT)
Dept: TRANSPLANT | Facility: CLINIC | Age: 69
End: 2024-12-16
Payer: MEDICARE

## 2024-12-16 ENCOUNTER — TELEPHONE (OUTPATIENT)
Dept: CARDIOLOGY | Facility: CLINIC | Age: 69
End: 2024-12-16
Payer: MEDICARE

## 2024-12-16 DIAGNOSIS — I35.0 AORTIC VALVE STENOSIS, ETIOLOGY OF CARDIAC VALVE DISEASE UNSPECIFIED: ICD-10-CM

## 2024-12-16 DIAGNOSIS — I35.0 SEVERE AORTIC STENOSIS: Primary | ICD-10-CM

## 2024-12-16 PROCEDURE — 25500020 PHARM REV CODE 255: Performed by: INTERNAL MEDICINE

## 2024-12-16 PROCEDURE — 74174 CTA ABD&PLVS W/CONTRAST: CPT | Mod: 26,,, | Performed by: STUDENT IN AN ORGANIZED HEALTH CARE EDUCATION/TRAINING PROGRAM

## 2024-12-16 PROCEDURE — 74174 CTA ABD&PLVS W/CONTRAST: CPT | Mod: TC

## 2024-12-16 PROCEDURE — 71275 CT ANGIOGRAPHY CHEST: CPT | Mod: 26,,, | Performed by: STUDENT IN AN ORGANIZED HEALTH CARE EDUCATION/TRAINING PROGRAM

## 2024-12-16 RX ADMIN — IOHEXOL 100 ML: 350 INJECTION, SOLUTION INTRAVENOUS at 03:12

## 2024-12-16 NOTE — TELEPHONE ENCOUNTER
Spoke to patient and wife on speaker phone.  Questions answered regarding CTA/ TAVR scheduled for today.    ----- Message from Barbi sent at 12/16/2024 12:30 PM CST -----  Regarding: Test  Patient wife have some questions regarding his test. Please call back @ [phone]. Thank you Barbi

## 2024-12-16 NOTE — TELEPHONE ENCOUNTER
"----- Message from Trent sent at 12/16/2024 11:55 AM CST -----  Regarding: call back  Consult/Advisory:        Name Of Caller: Self     Contact Preference?:[phone]     What is the nature of the call?: Calling to speak w/ Aniya in regards to some question about his CT for today requesting call back       Additional Notes:  "Thank you for all that you do for our patients"  "

## 2024-12-16 NOTE — TELEPHONE ENCOUNTER
"Attempted to return call to patient.  Had to leave VM.      ----- Message from Trent sent at 12/16/2024 11:55 AM CST -----  Regarding: call back  Consult/Advisory:        Name Of Caller: Self     Contact Preference?:[phone]     What is the nature of the call?: Calling to speak w/ Aniya in regards to some question about his CT for today requesting call back       Additional Notes:  "Thank you for all that you do for our patients"  "

## 2024-12-17 ENCOUNTER — TELEPHONE (OUTPATIENT)
Dept: TRANSPLANT | Facility: CLINIC | Age: 69
End: 2024-12-17
Payer: MEDICARE

## 2024-12-17 NOTE — TELEPHONE ENCOUNTER
Sent message to patient to let him know labs up and needs to to do labs on 12/23/24    ----- Message from Thais Maxwell MD sent at 12/16/2024 10:06 PM CST -----  The Labs are up. Prograf level is not low- repeat in one week - please let patient know.

## 2024-12-23 ENCOUNTER — LAB VISIT (OUTPATIENT)
Dept: LAB | Facility: HOSPITAL | Age: 69
End: 2024-12-23
Attending: INTERNAL MEDICINE
Payer: MEDICARE

## 2024-12-23 ENCOUNTER — PATIENT MESSAGE (OUTPATIENT)
Dept: TRANSPLANT | Facility: CLINIC | Age: 69
End: 2024-12-23
Payer: MEDICARE

## 2024-12-23 DIAGNOSIS — Z94.4 S/P LIVER TRANSPLANT: ICD-10-CM

## 2024-12-23 LAB
ALBUMIN SERPL BCP-MCNC: 3.6 G/DL (ref 3.5–5.2)
ALP SERPL-CCNC: 132 U/L (ref 40–150)
ALT SERPL W/O P-5'-P-CCNC: 17 U/L (ref 10–44)
ANION GAP SERPL CALC-SCNC: 6 MMOL/L (ref 8–16)
AST SERPL-CCNC: 18 U/L (ref 10–40)
BASOPHILS # BLD AUTO: 0.05 K/UL (ref 0–0.2)
BASOPHILS NFR BLD: 0.9 % (ref 0–1.9)
BILIRUB SERPL-MCNC: 0.6 MG/DL (ref 0.1–1)
BUN SERPL-MCNC: 28 MG/DL (ref 8–23)
CALCIUM SERPL-MCNC: 8.8 MG/DL (ref 8.7–10.5)
CHLORIDE SERPL-SCNC: 107 MMOL/L (ref 95–110)
CO2 SERPL-SCNC: 28 MMOL/L (ref 23–29)
CREAT SERPL-MCNC: 1.3 MG/DL (ref 0.5–1.4)
DIFFERENTIAL METHOD BLD: ABNORMAL
EOSINOPHIL # BLD AUTO: 0.1 K/UL (ref 0–0.5)
EOSINOPHIL NFR BLD: 2.3 % (ref 0–8)
ERYTHROCYTE [DISTWIDTH] IN BLOOD BY AUTOMATED COUNT: 14.2 % (ref 11.5–14.5)
EST. GFR  (NO RACE VARIABLE): 58.7 ML/MIN/1.73 M^2
GLUCOSE SERPL-MCNC: 84 MG/DL (ref 70–110)
HCT VFR BLD AUTO: 38.4 % (ref 40–54)
HGB BLD-MCNC: 11.9 G/DL (ref 14–18)
IMM GRANULOCYTES # BLD AUTO: 0.02 K/UL (ref 0–0.04)
IMM GRANULOCYTES NFR BLD AUTO: 0.4 % (ref 0–0.5)
LYMPHOCYTES # BLD AUTO: 1.3 K/UL (ref 1–4.8)
LYMPHOCYTES NFR BLD: 23.5 % (ref 18–48)
MCH RBC QN AUTO: 27.6 PG (ref 27–31)
MCHC RBC AUTO-ENTMCNC: 31 G/DL (ref 32–36)
MCV RBC AUTO: 89 FL (ref 82–98)
MONOCYTES # BLD AUTO: 0.7 K/UL (ref 0.3–1)
MONOCYTES NFR BLD: 13.3 % (ref 4–15)
NEUTROPHILS # BLD AUTO: 3.2 K/UL (ref 1.8–7.7)
NEUTROPHILS NFR BLD: 59.6 % (ref 38–73)
NRBC BLD-RTO: 0 /100 WBC
PLATELET # BLD AUTO: 99 K/UL (ref 150–450)
PMV BLD AUTO: 12.9 FL (ref 9.2–12.9)
POTASSIUM SERPL-SCNC: 4.1 MMOL/L (ref 3.5–5.1)
PROT SERPL-MCNC: 6.9 G/DL (ref 6–8.4)
RBC # BLD AUTO: 4.31 M/UL (ref 4.6–6.2)
SODIUM SERPL-SCNC: 141 MMOL/L (ref 136–145)
WBC # BLD AUTO: 5.32 K/UL (ref 3.9–12.7)

## 2024-12-23 PROCEDURE — 80197 ASSAY OF TACROLIMUS: CPT | Performed by: INTERNAL MEDICINE

## 2024-12-23 PROCEDURE — 80053 COMPREHEN METABOLIC PANEL: CPT | Performed by: INTERNAL MEDICINE

## 2024-12-23 PROCEDURE — 85025 COMPLETE CBC W/AUTO DIFF WBC: CPT | Performed by: INTERNAL MEDICINE

## 2024-12-23 PROCEDURE — 36415 COLL VENOUS BLD VENIPUNCTURE: CPT | Mod: PN | Performed by: INTERNAL MEDICINE

## 2024-12-24 LAB — TACROLIMUS BLD-MCNC: 8.8 NG/ML (ref 5–15)

## 2024-12-26 DIAGNOSIS — Z94.4 S/P LIVER TRANSPLANT: ICD-10-CM

## 2024-12-26 DIAGNOSIS — R97.8 OTHER ABNORMAL TUMOR MARKERS: ICD-10-CM

## 2024-12-26 DIAGNOSIS — C22.1 CHOLANGIOCARCINOMA: Primary | ICD-10-CM

## 2024-12-26 RX ORDER — TACROLIMUS 0.5 MG/1
0.5 CAPSULE ORAL EVERY 12 HOURS
Qty: 60 CAPSULE | Refills: 11 | Status: ACTIVE | OUTPATIENT
Start: 2024-12-26

## 2024-12-26 NOTE — TELEPHONE ENCOUNTER
Sent message to patient with change and to get labs on 1/27/25.    ----- Message from Thais Maxwell MD sent at 12/26/2024 12:28 PM CST -----  The Labs are stable; lower prograf to 0.5/0.5 from 1/0.5 and repeat labs in 1 month - please let patient know.

## 2024-12-30 DIAGNOSIS — S92.024A CLOSED NONDISPLACED FRACTURE OF ANTERIOR PROCESS OF RIGHT CALCANEUS, INITIAL ENCOUNTER: ICD-10-CM

## 2024-12-31 RX ORDER — PREGABALIN 75 MG/1
75 CAPSULE ORAL 2 TIMES DAILY
Qty: 60 CAPSULE | Refills: 0 | OUTPATIENT
Start: 2024-12-31 | End: 2025-01-30

## 2025-01-08 ENCOUNTER — HOSPITAL ENCOUNTER (INPATIENT)
Facility: HOSPITAL | Age: 70
LOS: 6 days | Discharge: LEFT BEFORE DISCHARGE | DRG: 101 | End: 2025-01-14
Attending: EMERGENCY MEDICINE | Admitting: EMERGENCY MEDICINE
Payer: MEDICARE

## 2025-01-08 ENCOUNTER — TELEPHONE (OUTPATIENT)
Dept: TRANSPLANT | Facility: CLINIC | Age: 70
End: 2025-01-08
Payer: MEDICARE

## 2025-01-08 DIAGNOSIS — R56.9 SEIZURE: Primary | ICD-10-CM

## 2025-01-08 DIAGNOSIS — I68.0 CEREBRAL AMYLOID ANGIOPATHY: ICD-10-CM

## 2025-01-08 DIAGNOSIS — R07.9 CHEST PAIN: ICD-10-CM

## 2025-01-08 DIAGNOSIS — I48.91 AFIB: ICD-10-CM

## 2025-01-08 DIAGNOSIS — E85.4 CEREBRAL AMYLOID ANGIOPATHY: ICD-10-CM

## 2025-01-08 LAB
ALBUMIN SERPL BCP-MCNC: 4.1 G/DL (ref 3.5–5.2)
ALLENS TEST: NORMAL
ALP SERPL-CCNC: 113 U/L (ref 40–150)
ALT SERPL W/O P-5'-P-CCNC: 13 U/L (ref 10–44)
AMMONIA PLAS-SCNC: 39 UMOL/L (ref 10–50)
ANION GAP SERPL CALC-SCNC: 16 MMOL/L (ref 8–16)
AST SERPL-CCNC: 19 U/L (ref 10–40)
BACTERIA #/AREA URNS AUTO: ABNORMAL /HPF
BASOPHILS # BLD AUTO: 0.12 K/UL (ref 0–0.2)
BASOPHILS NFR BLD: 0.5 % (ref 0–1.9)
BILIRUB SERPL-MCNC: 1 MG/DL (ref 0.1–1)
BILIRUB UR QL STRIP: NEGATIVE
BNP SERPL-MCNC: 139 PG/ML (ref 0–99)
BUN SERPL-MCNC: 32 MG/DL (ref 8–23)
CALCIUM SERPL-MCNC: 9.8 MG/DL (ref 8.7–10.5)
CHLORIDE SERPL-SCNC: 110 MMOL/L (ref 95–110)
CLARITY UR REFRACT.AUTO: CLEAR
CO2 SERPL-SCNC: 15 MMOL/L (ref 23–29)
COLOR UR AUTO: COLORLESS
CREAT SERPL-MCNC: 1.4 MG/DL (ref 0.5–1.4)
DIFFERENTIAL METHOD BLD: ABNORMAL
EOSINOPHIL # BLD AUTO: 0.1 K/UL (ref 0–0.5)
EOSINOPHIL NFR BLD: 0.4 % (ref 0–8)
ERYTHROCYTE [DISTWIDTH] IN BLOOD BY AUTOMATED COUNT: 15.2 % (ref 11.5–14.5)
EST. GFR  (NO RACE VARIABLE): 53.7 ML/MIN/1.73 M^2
ETHANOL SERPL-MCNC: <10 MG/DL
GLUCOSE SERPL-MCNC: 261 MG/DL (ref 70–110)
GLUCOSE UR QL STRIP: ABNORMAL
HCT VFR BLD AUTO: 47.4 % (ref 40–54)
HGB BLD-MCNC: 14 G/DL (ref 14–18)
HGB UR QL STRIP: NEGATIVE
IMM GRANULOCYTES # BLD AUTO: 0.29 K/UL (ref 0–0.04)
IMM GRANULOCYTES NFR BLD AUTO: 1.3 % (ref 0–0.5)
INFLUENZA A, MOLECULAR: NOT DETECTED
INFLUENZA B, MOLECULAR: NOT DETECTED
INR PPP: 1.3 (ref 0.8–1.2)
KETONES UR QL STRIP: ABNORMAL
LDH SERPL L TO P-CCNC: 2.16 MMOL/L (ref 0.5–2.2)
LEUKOCYTE ESTERASE UR QL STRIP: NEGATIVE
LIPASE SERPL-CCNC: 23 U/L (ref 4–60)
LYMPHOCYTES # BLD AUTO: 1 K/UL (ref 1–4.8)
LYMPHOCYTES NFR BLD: 4.4 % (ref 18–48)
MAGNESIUM SERPL-MCNC: 2.3 MG/DL (ref 1.6–2.6)
MCH RBC QN AUTO: 26.4 PG (ref 27–31)
MCHC RBC AUTO-ENTMCNC: 29.5 G/DL (ref 32–36)
MCV RBC AUTO: 89 FL (ref 82–98)
MICROSCOPIC COMMENT: ABNORMAL
MONOCYTES # BLD AUTO: 1.3 K/UL (ref 0.3–1)
MONOCYTES NFR BLD: 6 % (ref 4–15)
NEUTROPHILS # BLD AUTO: 19.4 K/UL (ref 1.8–7.7)
NEUTROPHILS NFR BLD: 87.4 % (ref 38–73)
NITRITE UR QL STRIP: NEGATIVE
NRBC BLD-RTO: 0 /100 WBC
OHS QRS DURATION: 116 MS
OHS QTC CALCULATION: 503 MS
PH UR STRIP: 6 [PH] (ref 5–8)
PLATELET # BLD AUTO: 142 K/UL (ref 150–450)
PMV BLD AUTO: 12 FL (ref 9.2–12.9)
POTASSIUM SERPL-SCNC: 4.9 MMOL/L (ref 3.5–5.1)
PROT SERPL-MCNC: 8.2 G/DL (ref 6–8.4)
PROT UR QL STRIP: NEGATIVE
PROTHROMBIN TIME: 13.7 SEC (ref 9–12.5)
RBC # BLD AUTO: 5.3 M/UL (ref 4.6–6.2)
RBC #/AREA URNS AUTO: 1 /HPF (ref 0–4)
RSV AG BY MOLECULAR METHOD: NOT DETECTED
SAMPLE: NORMAL
SARS-COV-2 RNA RESP QL NAA+PROBE: NOT DETECTED
SITE: NORMAL
SODIUM SERPL-SCNC: 141 MMOL/L (ref 136–145)
SP GR UR STRIP: 1.03 (ref 1–1.03)
TROPONIN I SERPL DL<=0.01 NG/ML-MCNC: 6 NG/L (ref 0–35)
TROPONIN I SERPL DL<=0.01 NG/ML-MCNC: 9 NG/L (ref 0–35)
URN SPEC COLLECT METH UR: ABNORMAL
WBC # BLD AUTO: 22.15 K/UL (ref 3.9–12.7)
WBC #/AREA URNS AUTO: 0 /HPF (ref 0–5)
YEAST UR QL AUTO: ABNORMAL

## 2025-01-08 PROCEDURE — 99285 EMERGENCY DEPT VISIT HI MDM: CPT | Mod: 25

## 2025-01-08 PROCEDURE — 84443 ASSAY THYROID STIM HORMONE: CPT

## 2025-01-08 PROCEDURE — 11000001 HC ACUTE MED/SURG PRIVATE ROOM

## 2025-01-08 PROCEDURE — 85025 COMPLETE CBC W/AUTO DIFF WBC: CPT | Performed by: EMERGENCY MEDICINE

## 2025-01-08 PROCEDURE — 93005 ELECTROCARDIOGRAM TRACING: CPT

## 2025-01-08 PROCEDURE — 85610 PROTHROMBIN TIME: CPT | Performed by: EMERGENCY MEDICINE

## 2025-01-08 PROCEDURE — 83880 ASSAY OF NATRIURETIC PEPTIDE: CPT | Performed by: EMERGENCY MEDICINE

## 2025-01-08 PROCEDURE — 96360 HYDRATION IV INFUSION INIT: CPT | Mod: 59

## 2025-01-08 PROCEDURE — 99900035 HC TECH TIME PER 15 MIN (STAT)

## 2025-01-08 PROCEDURE — 0241U SARS-COV2 (COVID) WITH FLU/RSV BY PCR: CPT | Performed by: EMERGENCY MEDICINE

## 2025-01-08 PROCEDURE — 96375 TX/PRO/DX INJ NEW DRUG ADDON: CPT

## 2025-01-08 PROCEDURE — 83735 ASSAY OF MAGNESIUM: CPT | Performed by: EMERGENCY MEDICINE

## 2025-01-08 PROCEDURE — 81001 URINALYSIS AUTO W/SCOPE: CPT | Performed by: EMERGENCY MEDICINE

## 2025-01-08 PROCEDURE — 82550 ASSAY OF CK (CPK): CPT

## 2025-01-08 PROCEDURE — 25500020 PHARM REV CODE 255: Performed by: EMERGENCY MEDICINE

## 2025-01-08 PROCEDURE — 25000003 PHARM REV CODE 250: Performed by: EMERGENCY MEDICINE

## 2025-01-08 PROCEDURE — 80197 ASSAY OF TACROLIMUS: CPT | Performed by: EMERGENCY MEDICINE

## 2025-01-08 PROCEDURE — 80053 COMPREHEN METABOLIC PANEL: CPT | Performed by: EMERGENCY MEDICINE

## 2025-01-08 PROCEDURE — 87040 BLOOD CULTURE FOR BACTERIA: CPT | Mod: 59 | Performed by: EMERGENCY MEDICINE

## 2025-01-08 PROCEDURE — 63600175 PHARM REV CODE 636 W HCPCS: Performed by: EMERGENCY MEDICINE

## 2025-01-08 PROCEDURE — 82140 ASSAY OF AMMONIA: CPT | Performed by: EMERGENCY MEDICINE

## 2025-01-08 PROCEDURE — 84145 PROCALCITONIN (PCT): CPT

## 2025-01-08 PROCEDURE — 96361 HYDRATE IV INFUSION ADD-ON: CPT

## 2025-01-08 PROCEDURE — 25000003 PHARM REV CODE 250

## 2025-01-08 PROCEDURE — 84484 ASSAY OF TROPONIN QUANT: CPT | Performed by: EMERGENCY MEDICINE

## 2025-01-08 PROCEDURE — 83605 ASSAY OF LACTIC ACID: CPT

## 2025-01-08 PROCEDURE — 96365 THER/PROPH/DIAG IV INF INIT: CPT

## 2025-01-08 PROCEDURE — 83690 ASSAY OF LIPASE: CPT | Performed by: EMERGENCY MEDICINE

## 2025-01-08 PROCEDURE — 93010 ELECTROCARDIOGRAM REPORT: CPT | Mod: ,,, | Performed by: INTERNAL MEDICINE

## 2025-01-08 PROCEDURE — 82077 ASSAY SPEC XCP UR&BREATH IA: CPT | Performed by: EMERGENCY MEDICINE

## 2025-01-08 RX ORDER — NALOXONE HCL 0.4 MG/ML
0.02 VIAL (ML) INJECTION
Status: DISCONTINUED | OUTPATIENT
Start: 2025-01-08 | End: 2025-01-14 | Stop reason: HOSPADM

## 2025-01-08 RX ORDER — TACROLIMUS 0.5 MG/1
0.5 CAPSULE ORAL 2 TIMES DAILY
Status: DISCONTINUED | OUTPATIENT
Start: 2025-01-09 | End: 2025-01-13

## 2025-01-08 RX ORDER — IBUPROFEN 200 MG
16 TABLET ORAL
Status: DISCONTINUED | OUTPATIENT
Start: 2025-01-08 | End: 2025-01-14 | Stop reason: HOSPADM

## 2025-01-08 RX ORDER — TALC
6 POWDER (GRAM) TOPICAL NIGHTLY PRN
Status: DISCONTINUED | OUTPATIENT
Start: 2025-01-08 | End: 2025-01-14 | Stop reason: HOSPADM

## 2025-01-08 RX ORDER — INSULIN ASPART 100 [IU]/ML
0-5 INJECTION, SOLUTION INTRAVENOUS; SUBCUTANEOUS
Status: DISCONTINUED | OUTPATIENT
Start: 2025-01-09 | End: 2025-01-14 | Stop reason: HOSPADM

## 2025-01-08 RX ORDER — QUETIAPINE FUMARATE 100 MG/1
100 TABLET, FILM COATED ORAL NIGHTLY
Status: DISCONTINUED | OUTPATIENT
Start: 2025-01-09 | End: 2025-01-08

## 2025-01-08 RX ORDER — IBUPROFEN 200 MG
24 TABLET ORAL
Status: DISCONTINUED | OUTPATIENT
Start: 2025-01-08 | End: 2025-01-14 | Stop reason: HOSPADM

## 2025-01-08 RX ORDER — VANCOMYCIN 1.75 GRAM/500 ML IN 0.9 % SODIUM CHLORIDE INTRAVENOUS
1750
Status: COMPLETED | OUTPATIENT
Start: 2025-01-08 | End: 2025-01-08

## 2025-01-08 RX ORDER — ATORVASTATIN CALCIUM 40 MG/1
80 TABLET, FILM COATED ORAL DAILY
Status: DISCONTINUED | OUTPATIENT
Start: 2025-01-09 | End: 2025-01-14 | Stop reason: HOSPADM

## 2025-01-08 RX ORDER — FUROSEMIDE 20 MG/1
20 TABLET ORAL DAILY
Status: DISCONTINUED | OUTPATIENT
Start: 2025-01-09 | End: 2025-01-09

## 2025-01-08 RX ORDER — VANCOMYCIN 1.75 GRAM/500 ML IN 0.9 % SODIUM CHLORIDE INTRAVENOUS
1750
Status: DISCONTINUED | OUTPATIENT
Start: 2025-01-09 | End: 2025-01-09

## 2025-01-08 RX ORDER — LEVETIRACETAM 500 MG/1
500 TABLET ORAL 2 TIMES DAILY
Status: DISCONTINUED | OUTPATIENT
Start: 2025-01-09 | End: 2025-01-14 | Stop reason: HOSPADM

## 2025-01-08 RX ORDER — ONDANSETRON HYDROCHLORIDE 2 MG/ML
4 INJECTION, SOLUTION INTRAVENOUS
Status: COMPLETED | OUTPATIENT
Start: 2025-01-08 | End: 2025-01-08

## 2025-01-08 RX ORDER — LORAZEPAM 2 MG/ML
1 INJECTION INTRAMUSCULAR
Status: COMPLETED | OUTPATIENT
Start: 2025-01-08 | End: 2025-01-08

## 2025-01-08 RX ORDER — SACUBITRIL AND VALSARTAN 49; 51 MG/1; MG/1
1 TABLET, FILM COATED ORAL 2 TIMES DAILY
Status: DISCONTINUED | OUTPATIENT
Start: 2025-01-08 | End: 2025-01-14 | Stop reason: HOSPADM

## 2025-01-08 RX ORDER — GLUCAGON 1 MG
1 KIT INJECTION
Status: DISCONTINUED | OUTPATIENT
Start: 2025-01-08 | End: 2025-01-14 | Stop reason: HOSPADM

## 2025-01-08 RX ORDER — CARVEDILOL 12.5 MG/1
12.5 TABLET ORAL 2 TIMES DAILY WITH MEALS
Status: DISCONTINUED | OUTPATIENT
Start: 2025-01-09 | End: 2025-01-10

## 2025-01-08 RX ORDER — LORAZEPAM 2 MG/ML
2 INJECTION INTRAMUSCULAR
Status: DISCONTINUED | OUTPATIENT
Start: 2025-01-08 | End: 2025-01-14 | Stop reason: HOSPADM

## 2025-01-08 RX ORDER — SODIUM CHLORIDE 0.9 % (FLUSH) 0.9 %
10 SYRINGE (ML) INJECTION EVERY 12 HOURS PRN
Status: DISCONTINUED | OUTPATIENT
Start: 2025-01-08 | End: 2025-01-14 | Stop reason: HOSPADM

## 2025-01-08 RX ORDER — SODIUM CHLORIDE 0.9 % (FLUSH) 0.9 %
10 SYRINGE (ML) INJECTION
Status: DISCONTINUED | OUTPATIENT
Start: 2025-01-08 | End: 2025-01-14 | Stop reason: HOSPADM

## 2025-01-08 RX ORDER — LEVETIRACETAM 500 MG/5ML
1500 INJECTION, SOLUTION, CONCENTRATE INTRAVENOUS
Status: COMPLETED | OUTPATIENT
Start: 2025-01-08 | End: 2025-01-08

## 2025-01-08 RX ORDER — HYDROMORPHONE HYDROCHLORIDE 1 MG/ML
0.5 INJECTION, SOLUTION INTRAMUSCULAR; INTRAVENOUS; SUBCUTANEOUS
Status: COMPLETED | OUTPATIENT
Start: 2025-01-08 | End: 2025-01-08

## 2025-01-08 RX ORDER — PREGABALIN 75 MG/1
75 CAPSULE ORAL 2 TIMES DAILY
Status: DISCONTINUED | OUTPATIENT
Start: 2025-01-09 | End: 2025-01-14 | Stop reason: HOSPADM

## 2025-01-08 RX ADMIN — PIPERACILLIN SODIUM AND TAZOBACTAM SODIUM 4.5 G: 4; .5 INJECTION, POWDER, LYOPHILIZED, FOR SOLUTION INTRAVENOUS at 05:01

## 2025-01-08 RX ADMIN — ONDANSETRON 4 MG: 2 INJECTION INTRAMUSCULAR; INTRAVENOUS at 03:01

## 2025-01-08 RX ADMIN — APIXABAN 5 MG: 5 TABLET, FILM COATED ORAL at 10:01

## 2025-01-08 RX ADMIN — SODIUM CHLORIDE 1000 ML: 9 INJECTION, SOLUTION INTRAVENOUS at 03:01

## 2025-01-08 RX ADMIN — LORAZEPAM 1 MG: 2 INJECTION INTRAMUSCULAR; INTRAVENOUS at 05:01

## 2025-01-08 RX ADMIN — IOHEXOL 100 ML: 350 INJECTION, SOLUTION INTRAVENOUS at 04:01

## 2025-01-08 RX ADMIN — SACUBITRIL AND VALSARTAN 1 TABLET: 49; 51 TABLET, FILM COATED ORAL at 11:01

## 2025-01-08 RX ADMIN — VANCOMYCIN HYDROCHLORIDE 1750 MG: 500 INJECTION, POWDER, LYOPHILIZED, FOR SOLUTION INTRAVENOUS at 07:01

## 2025-01-08 RX ADMIN — LEVETIRACETAM 1500 MG: 100 INJECTION INTRAVENOUS at 04:01

## 2025-01-08 RX ADMIN — HYDROMORPHONE HYDROCHLORIDE 0.5 MG: 1 INJECTION, SOLUTION INTRAMUSCULAR; INTRAVENOUS; SUBCUTANEOUS at 09:01

## 2025-01-08 NOTE — TELEPHONE ENCOUNTER
"I was on the phone with another patient, one of the other coordinators spoke to his wife.  Patient fell and EMS was coming to get him.  We will follow up once he gets here.    ----- Message from Trent sent at 1/8/2025  2:04 PM CST -----  Regarding: call back  Consult/Advisory:        Name Of Caller: Self     Contact Preference?:     What is the nature of the call?: Calling to speak w/  Aniya in regards to the pt having         Additional Notes:  "Thank you for all that you do for our patients"  "

## 2025-01-08 NOTE — ED PROVIDER NOTES
Encounter Date: 1/8/2025       History     Chief Complaint   Patient presents with    Seizures     Family activated 911 after finding pt on the floor of residence confused without obvious injury. Pt has brief witnessed seizure while in ems care no meds given pta. Pt arrives postictal.      69-year-old male, history of a liver transplant in 2018, CHF, AFib on Eliquis, hypertension, diabetes, PVD, brought in by EMS after being found down in his home.  Wife activated EMS after finding patient on the ground.  Unclear how he got there, whether he had had a seizure or a syncope event.  When EMS arrived, patient was confused and was covered in feces and vomiting.  When they pulled into the parking lot of our hospital, in the ambulance, patient had a witnessed tonic-clonic seizure lasting less than a minute.  It terminated spontaneously without medication.  Patient has no known history of seizures.  Of note, he does take Wellbutrin in his on Prograf for immunosuppression.    Spoke to wife later on the phone.  She reports that patient was in his normal state of health last night.  He woke up late today and she did not see him when he 1st woke up.  She then heard moaning from the bathroom and when she went in there it looks like he had fallen off of the toilet and was on the ground.  He was groaning and moaning and did seem confused and was unable to get up.  That is when she activated 911.  She states that while she was waiting for 911, patient had what looked to be a seizure at home.      The history is provided by the EMS personnel. The history is limited by the absence of a caregiver.     Review of patient's allergies indicates:   Allergen Reactions    Bee pollens Swelling     BEE STINGS swells body     Past Medical History:   Diagnosis Date    Acute appendicitis 06/02/2023    Aortic stenosis     Atrial fibrillation     johny/cardioversion on 1/26/24    Calculus of ureter 12/22/2023    CHF (congestive heart failure)      Cholangiocarcinoma 03/16/2022    s/p liver transplant    Cirrhosis 11/23/2020    s/p transplant 2022 for CASTILLO cirrhosis and cholangiocarcinoma    Diabetes mellitus, type 2     c/b Diabetic neuropathy    HTN (hypertension) 11/23/2020    Hyperlipidemia 11/23/2020    Kidney stones 11/23/2020    S/p lithotripsy 2020    PAD (peripheral artery disease)     Skin cancer 11/23/2020     Past Surgical History:   Procedure Laterality Date    ANGIOGRAM, CORONARY, WITH LEFT HEART CATHETERIZATION N/A 12/26/2023    Procedure: Angiogram, Coronary, with Left Heart Cath;  Surgeon: Carlos Kign MD;  Location: [facility] CATH LAB;  Service: Cardiology;  Laterality: N/A;    AORTIC VALVULOPLASTY N/A 2/1/2024    Procedure: REPAIR, AORTIC VALVE;  Surgeon: Shilo Carrasco MD;  Location: [facility] CATH LAB;  Service: Cardiology;  Laterality: N/A;    COLONOSCOPY  10/2020    ECHOCARDIOGRAM,TRANSESOPHAGEAL N/A 1/26/2024    Procedure: Transesophageal echo (ASHISH) intra-procedure log documentation;  Surgeon: Nick Mathur III, MD;  Location: [facility] EP LAB;  Service: Cardiology;  Laterality: N/A;    ESOPHAGEAL MANOMETRY WITH MEASUREMENT OF IMPEDANCE N/A 7/17/2023    Procedure: MANOMETRY, ESOPHAGUS, WITH IMPEDANCE MEASUREMENT;  Surgeon: Klarissa Zamora MD;  Location: [facility] ENDO (4TH FLR);  Service: Gastroenterology;  Laterality: N/A;  pt diabetic  liver txp  prep insructions sent to pt via email and portal -    ESOPHAGOGASTRODUODENOSCOPY  10/2020    ESOPHAGOGASTRODUODENOSCOPY N/A 7/1/2021    Procedure: EGD (ESOPHAGOGASTRODUODENOSCOPY);  Surgeon: Jovan David MD;  Location: [facility] ENDO (4TH FLR);  Service: Endoscopy;  Laterality: N/A;  HCC. Listed for liver transplant. EGD for variceal surveillance.  cardiac clearance and blood thinenr approval received, see telephone encounter 6/23/21-BB  fully vaccinated-BB  labs same day of procedure-BB    EXCISION OF HYDROCELE Right     hemorroid surgery      hemorroidectomy      KIDNEY STONE SURGERY       LAPAROSCOPIC APPENDECTOMY N/A 6/2/2023    Procedure: APPENDECTOMY, LAPAROSCOPIC;  Surgeon: hSan Ross MD;  Location: [facility] FLR;  Service: General;  Laterality: N/A;    LEFT HEART CATHETERIZATION N/A 1/27/2021    Procedure: Left heart cath;  Surgeon: Kris Shelton MD;  Location: [facility] CATH LAB;  Service: Cardiology;  Laterality: N/A;    liver mass removal      LIVER TRANSPLANT N/A 1/6/2022    Procedure: TRANSPLANT, LIVER;  Surgeon: Lizet Garcia MD;  Location: [facility] OR [facility] FLR;  Service: Transplant;  Laterality: N/A;    RIGHT HEART CATHETERIZATION Right 5/7/2021    Procedure: INSERTION, CATHETER, RIGHT HEART;  Surgeon: Jaden Schuster MD;  Location: [facility] CATH LAB;  Service: Cardiology;  Laterality: Right;    STENT, DRUG ELUTING, SINGLE VESSEL, CORONARY  12/26/2023    Procedure: Stent, Drug Eluting, Single Vessel, Coronary;  Surgeon: Carlos King MD;  Location: [facility] CATH LAB;  Service: Cardiology;;    TREATMENT OF CARDIAC ARRHYTHMIA N/A 5/19/2021    Procedure: CARDIOVERSION;  Surgeon: Didier Tilley MD;  Location: [facility] EP LAB;  Service: Cardiology;  Laterality: N/A;  a fib, dccv/johny, mac, dm. sscu    TREATMENT OF CARDIAC ARRHYTHMIA N/A 5/31/2021    Procedure: CARDIOVERSION;  Surgeon: Daniel Arambula MD;  Location: [facility] EP LAB;  Service: Cardiology;  Laterality: N/A;  afib, DCCV, anest., DM, 3 prep    TREATMENT OF CARDIAC ARRHYTHMIA N/A 7/7/2021    Procedure: Cardioversion or Defibrillation;  Surgeon: Didier Tilley MD;  Location: [facility] EP LAB;  Service: Cardiology;  Laterality: N/A;  AF, JOHNY (cancel if complaint), DCCV, MAC, DM, 3 Prep    TREATMENT OF CARDIAC ARRHYTHMIA N/A 7/27/2021    Procedure: Cardioversion or Defibrillation;  Surgeon: Didier Tilley MD;  Location: [facility] EP LAB;  Service: Cardiology;  Laterality: N/A;  AF, JOHNY (cx if complaint), DCCV, MAC, DM, 3 Prep    TREATMENT OF CARDIAC ARRHYTHMIA N/A 1/26/2024    Procedure: Cardioversion or Defibrillation;  Surgeon: Koko Knight,  MD;  Location: [facility] EP LAB;  Service: Cardiology;  Laterality: N/A;  AF, ASHISH, DCCV, MAC, DM, 3 Prep *ADENOSINE TEST* *LIVER TRANSPLANT PATIENT*     Family History   Problem Relation Name Age of Onset    Coronary artery disease Father      Colon cancer Neg Hx      Esophageal cancer Neg Hx       Social History     Tobacco Use    Smoking status: Former     Current packs/day: 0.00     Types: Cigarettes     Quit date: 1980     Years since quittin.9    Smokeless tobacco: Former   Substance Use Topics    Alcohol use: Not Currently    Drug use: Never     Review of Systems    Physical Exam     Initial Vitals [25 1430]   BP Pulse Resp Temp SpO2   (!) 180/82 98 16 97.2 °F (36.2 °C) 99 %      MAP       --         Physical Exam    Nursing note and vitals reviewed.  Constitutional: He is uncooperative. He has a sickly appearance. He appears ill.   HENT: Mouth/Throat: Oropharynx is clear and moist.   Eyes: Conjunctivae are normal.   Neck: Neck supple.   Cardiovascular:  Normal rate.           Pulmonary/Chest: No respiratory distress.   Abdominal: Abdomen is soft. He exhibits no distension. There is no abdominal tenderness.   Covered in bilious vomit There is no rebound and no guarding.   Musculoskeletal:         General: No edema.      Cervical back: Neck supple.      Comments: Superficial abrasions to L UE     Neurological:   Patient is awake but drowsy, he can answer some questions appropriately but is very confused.  He is moving all of his extremities equally can follow some commands but is largely uncooperative with the exam.   Skin: Skin is warm and dry. No pallor.         ED Course   Procedures  Labs Reviewed   CBC W/ AUTO DIFFERENTIAL - Abnormal       Result Value    WBC 22.15 (*)     RBC 5.30      Hemoglobin 14.0      Hematocrit 47.4      MCV 89      MCH 26.4 (*)     MCHC 29.5 (*)     RDW 15.2 (*)     Platelets 142 (*)     MPV 12.0      Immature Granulocytes 1.3 (*)     Gran # (ANC) 19.4 (*)     Immature  Grans (Abs) 0.29 (*)     Lymph # 1.0      Mono # 1.3 (*)     Eos # 0.1      Baso # 0.12      nRBC 0      Gran % 87.4 (*)     Lymph % 4.4 (*)     Mono % 6.0      Eosinophil % 0.4      Basophil % 0.5      Differential Method Automated     COMPREHENSIVE METABOLIC PANEL - Abnormal    Sodium 141      Potassium 4.9      Chloride 110      CO2 15 (*)     Glucose 261 (*)     BUN 32 (*)     Creatinine 1.4      Calcium 9.8      Total Protein 8.2      Albumin 4.1      Total Bilirubin 1.0      Alkaline Phosphatase 113      AST 19      ALT 13      eGFR 53.7 (*)     Anion Gap 16     B-TYPE NATRIURETIC PEPTIDE - Abnormal     (*)    URINALYSIS, REFLEX TO URINE CULTURE - Abnormal    Specimen UA Urine, Clean Catch      Color, UA Colorless (*)     Appearance, UA Clear      pH, UA 6.0      Specific Gravity, UA 1.030      Protein, UA Negative      Glucose, UA 4+ (*)     Ketones, UA Trace (*)     Bilirubin (UA) Negative      Occult Blood UA Negative      Nitrite, UA Negative      Leukocytes, UA Negative      Narrative:     Specimen Source->Urine   PROTIME-INR - Abnormal    Prothrombin Time 13.7 (*)     INR 1.3 (*)    URINALYSIS MICROSCOPIC - Abnormal    RBC, UA 1      WBC, UA 0      Bacteria Rare      Yeast, UA Rare (*)     Microscopic Comment SEE COMMENT      Narrative:     Specimen Source->Urine   CULTURE, BLOOD   CULTURE, BLOOD   TROPONIN I HIGH SENSITIVITY    Troponin I High Sensitivity 6     LIPASE    Lipase 23     AMMONIA    Ammonia 39     MAGNESIUM    Magnesium 2.3     ALCOHOL,MEDICAL (ETHANOL)    Alcohol, Serum <10     SARS-COV2 (COVID) WITH FLU/RSV BY PCR    SARS-CoV2 (COVID-19) Qualitative PCR Not Detected      Influenza A, Molecular Not Detected      Influenza B, Molecular Not Detected      RSV Ag by Molecular Method Not Detected     TROPONIN I HIGH SENSITIVITY    Troponin I High Sensitivity 9     TACROLIMUS LEVEL   TSH   CK   PROCALCITONIN   LACTIC ACID, PLASMA   ISTAT LACTATE    POC Lactate 2.16      Sample VENOUS       Site Other      Allens Test N/A     ISTAT CHEM8        ECG Results              EKG 12-lead (Final result)        Collection Time Result Time QRS Duration OHS QTC Calculation    01/08/25 15:13:01 01/08/25 15:20:50 116 503                     Final result by Interface, Lab In [facility] (01/08/25 15:20:58)                   Narrative:    Test Reason : R07.9,    Vent. Rate :  98 BPM     Atrial Rate :  98 BPM     P-R Int : 202 ms          QRS Dur : 116 ms      QT Int : 394 ms       P-R-T Axes :  80 -52  79 degrees    QTcB Int : 503 ms    Normal sinus rhythm  Left anterior fascicular block  Prolonged QT  Abnormal ECG  When compared with ECG of 04-Oct-2024 10:54,  Vent. rate has increased by  46 bpm  Questionable change in QRS duration  Confirmed by Alexi Zuñiga ([id number]) on 1/8/2025 3:20:47 PM    Referred By:            Confirmed By: Alexi Zuñiga                                  Imaging Results              X-Ray Chest AP Portable (Final result)  Result time 01/08/25 20:20:56      Final result by Stan Braun MD (01/08/25 20:20:56)                   Impression:      No radiographic acute intrathoracic process seen on this limited single view.      Electronically signed by: Stan Braun MD  Date:    01/08/2025  Time:    20:20               Narrative:    EXAMINATION:  XR CHEST AP PORTABLE    CLINICAL HISTORY:  ams;    TECHNIQUE:  Single frontal view of the chest was performed.    COMPARISON:  CT abdomen and pelvis earlier same date, sternum series 08/25/2024, chest radiograph 01/27/2024, CT chest 10/08/2024    FINDINGS:  Monitoring leads overlie the chest.    Bibasilar minimal platelike scarring versus atelectasis.  The lungs are otherwise well expanded without large consolidation, pleural effusion or pneumothorax identified.  Cardiomediastinal silhouette is midline with similar calcification and tortuosity of the aorta.  Heart is not significantly enlarged.  Pulmonary vasculature and hilar contours are within normal  limits.  No acute osseous process definitively seen.  PA and lateral views can be obtained.                                       CT Abdomen Pelvis With IV Contrast NO Oral Contrast (Final result)  Result time 01/08/25 19:36:26      Final result by Stan Braun MD (01/08/25 19:36:26)                   Impression:      No acute abnormality.    Operative change of liver transplant with continued prominence of the intrahepatic biliary ducts, similar to priors.    Diverticulosis coli.  No evidence of diverticulitis.    Advanced aortoiliac calcific atherosclerosis.    Healing fractures of the 9th through 11th right ribs.    Other incidental/nonemergent findings in the body of the report.    Electronically signed by resident: Flor Conner  Date:    01/08/2025  Time:    17:31    Electronically signed by: Stan Braun MD  Date:    01/08/2025  Time:    19:36               Narrative:    EXAMINATION:  CT ABDOMEN PELVIS WITH IV CONTRAST    CLINICAL HISTORY:  Abdominal pain, acute, nonlocalized;    TECHNIQUE:  Axial images of the abdomen and pelvis were acquired after the use of 100 cc IV Omnipaque.  Coronal and sagittal reconstructions were also obtained    COMPARISON:  CT 10/08/2024, 12/16/2024, 04/12/2024    FINDINGS:  Thoracic soft tissues: Partially visualized thoracic soft tissues appear unremarkable.    Heart: Heart size is normal.  Trace pericardial fluid anteriorly, similar to prior.  Calcific disease of the coronary arteries.  Calcification of the aortic valve.    Lungs: Mild bibasilar atelectasis.  No pleural effusion.    Esophagus: Unremarkable.    Stomach and duodenum: Unremarkable.    Liver: Operative change of liver transplant.  Mild prominence of the intrahepatic biliary ducts, unchanged from 04/12/2024.  Small hypodensity in the right hepatic lobe, unchanged from 10/08/2024 (2-32).  Portal vein is patent.    Gallbladder: Cholecystectomy.    Bile Ducts: No evidence of dilated ducts.    Spleen: Spleen is  upper limit of normal in size.    Pancreas: No mass or peripancreatic fat stranding.    Adrenals: Stable 0.9 cm left adrenal nodule, favored to represent a small adenoma (less than 10 Hounsfield units on noncontrast CT), unchanged from multiple priors.    Kidneys/Ureters: Normal in size and location. Bilateral renal cysts and additional hypodensities too small to characterize.  Punctate nonobstructing stones in the right kidney.  Additional bilateral nonobstructing stones versus vascular calcifications.  No hydronephrosis.  No ureteral dilatation.    Bladder: No evidence of wall thickening.    Reproductive organs: Unremarkable.    Bowel/Mesentery: Mild diverticulosis coli.  No evidence of diverticulitis.  No obstruction or inflammation.  Appendectomy.    Peritoneum: No intraperitoneal free air or fluid.    Lymph nodes: No lymphadenopathy.    Abdominal wall:  Tiny fat containing umbilical hernia.    Vasculature: No aneurysm. Extensive circumferential calcific atherosclerosis through the abdominal aorta and its branches.  There is likely at least mild narrowing at the origin of the celiac artery and at least moderate narrowing at the origin of the SMA.  There is dense calcification of the bilateral renal arteries..    Bones: Healing fractures of the 9th through 11th right ribs with mild callus formation.  Fractures are grossly stable in configuration from 12/16/2024, but few the fractures may be mildly more displaced compared to CT 10/08/2024.  No suspicious osseous lesions.  Degenerative changes of the spine.                                       CT Head Without Contrast (Final result)  Result time 01/08/25 16:29:15      Final result by João White MD (01/08/25 16:29:15)                   Impression:      No evidence of acute hemorrhage or major vascular distribution infarct.    Mild chronic small vessel ischemic change and generalized cerebral volume loss, similar to prior.      Electronically signed  by: João White MD  Date:    01/08/2025  Time:    16:29               Narrative:    EXAMINATION:  CT HEAD WITHOUT CONTRAST    CLINICAL HISTORY:  Mental status change, unknown cause;    TECHNIQUE:  Low dose axial CT images obtained throughout the head without the use of intravenous contrast.  Axial, sagittal and coronal reconstructions were performed.    COMPARISON:  10/11/2023.    FINDINGS:  Intracranial compartment:    Ventricles are stable in size.  Mild volume loss, without evidence of hydrocephalus.    Mild patchy hypoattenuation in the supratentorial white matter, nonspecific but most likely reflecting chronic small vessel ischemic changes. No recent or remote major vascular distribution infarct. No acute hemorrhage.  No mass effect or midline shift.    No extra-axial blood or fluid collections.    Skull/extracranial contents (limited evaluation):    No displaced calvarial fracture.    The mastoid air cells and visualized paranasal sinuses are essentially clear.                                       Medications   LORazepam injection 2 mg (0 mg Intravenous Return to Cabinet 1/8/25 2481)   sodium chloride 0.9% flush 10 mL (has no administration in time range)   melatonin tablet 6 mg (has no administration in time range)   apixaban tablet 5 mg (has no administration in time range)   atorvastatin tablet 80 mg (has no administration in time range)   furosemide tablet 20 mg (has no administration in time range)   sacubitriL-valsartan 49-51 mg per tablet 1 tablet (has no administration in time range)   tacrolimus capsule 0.5 mg (has no administration in time range)   pregabalin capsule 75 mg (has no administration in time range)   empagliflozin (Jardiance) tablet 10 mg (has no administration in time range)   carvediloL tablet 12.5 mg (has no administration in time range)   sodium chloride 0.9% flush 10 mL (has no administration in time range)   naloxone 0.4 mg/mL injection 0.02 mg (has no administration in time  range)   glucose chewable tablet 16 g (has no administration in time range)   glucose chewable tablet 24 g (has no administration in time range)   dextrose 50% injection 12.5 g (has no administration in time range)   dextrose 50% injection 25 g (has no administration in time range)   glucagon (human recombinant) injection 1 mg (has no administration in time range)   ondansetron injection 4 mg (4 mg Intravenous Given 1/8/25 1506)   sodium chloride 0.9% bolus 1,000 mL 1,000 mL (0 mLs Intravenous Stopped 1/8/25 1758)   LORazepam injection 1 mg (1 mg Intravenous Given 1/8/25 1710)   levETIRAcetam injection 1,500 mg (1,500 mg Intravenous Given 1/8/25 1647)   iohexoL (OMNIPAQUE 350) injection 100 mL (100 mLs Intravenous Given 1/8/25 1612)   vancomycin 1750 mg in 0.9% sodium chloride 500 mL IVPB (0 mg Intravenous Stopped 1/8/25 2129)   piperacillin-tazobactam (ZOSYN) 4.5 g in D5W 100 mL IVPB (MB+) (0 g Intravenous Stopped 1/8/25 1837)   HYDROmorphone injection 0.5 mg (0.5 mg Intravenous Given 1/8/25 2111)     Medical Decision Making  Patient is acutely demonstrating signs of acute, global encephalopathy.  DDx would include metabolic crisis/electrolyte derangement, infection, CNS process like ICH or CVA or meningitis/encephalitis, seizures/post-ictal state or less likely NCSE, polypharmacy, drug overdose/intoxication, withdrawal syndrome, vitamin deficiency, hepatic encephalopathy, uremia.    Differential would be quite broad.  Would consider hepatic encephalopathy though seems like patient's liver function has been normal recently.  Other considerations are traumatic head bleed.  In terms of his new seizure, Wellbutrin prescription is a concern and Prograf puts him at increased risk.    The following tests/interventions will be ordered to determine the etiology of the patient's AMS:   -Labs blood cultures, CBC, CMP, lipase, magnesium, ethanol level, ammonia level  -Imaging chest x-ray, CT abdomen pelvis and CT  head.  -Medication review  -will empirically start on Keppra given likelihood of 2 seizures in short succession.  -hold bupropion  -Close monitoring and frequent neuro checks    -Disposition:  Admission      Amount and/or Complexity of Data Reviewed  External Data Reviewed: labs, radiology and notes.  Labs: ordered. Decision-making details documented in ED Course.  Radiology: ordered and independent interpretation performed. Decision-making details documented in ED Course.  ECG/medicine tests: ordered and independent interpretation performed. Decision-making details documented in ED Course.    Risk  OTC drugs.  Prescription drug management.  Decision regarding hospitalization.              Attending Attestation:         Attending Critical Care:   Critical Care Times:   ==============================================================  Total Critical Care Time - exclusive of procedural time: 35 minutes.  ==============================================================  Critical care was necessary to treat or prevent imminent or life-threatening deterioration of the following conditions: sepsis and CNS failure.   Critical care was time spent personally by me on the following activities: obtaining history from patient or relative, examination of patient, review of old charts, review of x-rays / CT sent with the patient, development of treatment plan with patient or relative, ordering lab, x-rays, and/or EKG, ordering and performing treatments and interventions, evaluation of patient's response to treatment, discussion with consultants and re-evaluation of patient's conition.   Critical Care Condition: potentially life-threatening           ED Course as of 01/08/25 2201 Wed Jan 08, 2025   1647 WBC(!): 22.15  Given degree of leukocytosis as well as underlying immunosuppression, will treat empirically with broad-spectrum antibiotics though no source of infection obvious at this point in time. [AS]   1647 CO2(!): 15 [AS]    1647 Glucose(!): 261 [AS]   1647 BUN(!): 32 [AS]   1647 Lipase: 23 [AS]   1647 Troponin I High Sensitivity: 6 [AS]   1648 Magnesium : 2.3 [AS]   1648 Ammonia: 39 [AS]   1648 Patient much more appropriate, can answer almost all my questions appropriately now, fully cooperative with exam.  I do not see any signs of a head bleed on the CT head. [AS]   1841 RBC, UA: 1 [AS]   1841 WBC, UA: 0 [AS]   1841 Leukocyte Esterase, UA: Negative [AS]   1841 NITRITE UA: Negative [AS]   1841 Blood, UA: Negative [AS]      ED Course User Index  [AS] Susie Green MD                           Clinical Impression:  Final diagnoses:  [R07.9] Chest pain  [R56.9] Seizure (Primary)          ED Disposition Condition    Admit Stable                Susie Green MD  01/08/25 1811

## 2025-01-08 NOTE — TELEPHONE ENCOUNTER
"Returned call let his wife know that they will consult Hepatology to manage his immunosuppression.  They will check everything to try to find out what is going on.    ----- Message from Jan sent at 1/8/2025  4:21 PM CST -----  Consult/Advisory    Name Of Caller: Aniya Berkowitz (Significant other)    Contact Preference?: [phone]    What is the nature of the call?: Calling to speak w/ Aniya. Inquiring about how long pt can go without taking his autoimmune medication while he's admitted    Additional Notes:  "Thank you for all that you do for our patients"  "

## 2025-01-08 NOTE — ED TRIAGE NOTES
Pt arrives via EMS after being found down by family. Pt is confused, hx of liver disease. No obvious injury. Pt moving all exts. Vomiting and covered in stool.

## 2025-01-09 ENCOUNTER — DOCUMENTATION ONLY (OUTPATIENT)
Dept: NEUROLOGY | Facility: CLINIC | Age: 70
End: 2025-01-09
Payer: MEDICARE

## 2025-01-09 PROBLEM — I16.0 HYPERTENSIVE URGENCY: Status: ACTIVE | Noted: 2025-01-09

## 2025-01-09 PROBLEM — D84.9 IMMUNOCOMPROMISED STATE: Status: ACTIVE | Noted: 2025-01-09

## 2025-01-09 PROBLEM — Z85.828 HISTORY OF SKIN CANCER: Status: ACTIVE | Noted: 2025-01-09

## 2025-01-09 PROBLEM — D68.69 HYPERCOAGULABLE STATE DUE TO ATRIAL FIBRILLATION: Status: ACTIVE | Noted: 2025-01-09

## 2025-01-09 PROBLEM — I48.91 HYPERCOAGULABLE STATE DUE TO ATRIAL FIBRILLATION: Status: ACTIVE | Noted: 2025-01-09

## 2025-01-09 LAB
ALBUMIN SERPL BCP-MCNC: 3.6 G/DL (ref 3.5–5.2)
ALP SERPL-CCNC: 87 U/L (ref 40–150)
ALT SERPL W/O P-5'-P-CCNC: 14 U/L (ref 10–44)
ANION GAP SERPL CALC-SCNC: 9 MMOL/L (ref 8–16)
APTT PPP: 32 SEC (ref 21–32)
AST SERPL-CCNC: 25 U/L (ref 10–40)
BASOPHILS # BLD AUTO: 0.08 K/UL (ref 0–0.2)
BASOPHILS NFR BLD: 0.5 % (ref 0–1.9)
BILIRUB SERPL-MCNC: 1 MG/DL (ref 0.1–1)
BUN SERPL-MCNC: 28 MG/DL (ref 8–23)
CALCIUM SERPL-MCNC: 8.8 MG/DL (ref 8.7–10.5)
CHLORIDE SERPL-SCNC: 108 MMOL/L (ref 95–110)
CK SERPL-CCNC: 274 U/L (ref 20–200)
CO2 SERPL-SCNC: 22 MMOL/L (ref 23–29)
CREAT SERPL-MCNC: 1.4 MG/DL (ref 0.5–1.4)
DIFFERENTIAL METHOD BLD: ABNORMAL
EOSINOPHIL # BLD AUTO: 0.1 K/UL (ref 0–0.5)
EOSINOPHIL NFR BLD: 0.6 % (ref 0–8)
ERYTHROCYTE [DISTWIDTH] IN BLOOD BY AUTOMATED COUNT: 15.5 % (ref 11.5–14.5)
EST. GFR  (NO RACE VARIABLE): 53.7 ML/MIN/1.73 M^2
ESTIMATED AVG GLUCOSE: 143 MG/DL (ref 68–131)
GLUCOSE SERPL-MCNC: 108 MG/DL (ref 70–110)
HBA1C MFR BLD: 6.6 % (ref 4–5.6)
HCT VFR BLD AUTO: 40.9 % (ref 40–54)
HGB BLD-MCNC: 13.1 G/DL (ref 14–18)
IMM GRANULOCYTES # BLD AUTO: 0.06 K/UL (ref 0–0.04)
IMM GRANULOCYTES NFR BLD AUTO: 0.4 % (ref 0–0.5)
INR PPP: 1.3 (ref 0.8–1.2)
LACTATE SERPL-SCNC: 1 MMOL/L (ref 0.5–2.2)
LYMPHOCYTES # BLD AUTO: 1.6 K/UL (ref 1–4.8)
LYMPHOCYTES NFR BLD: 10.6 % (ref 18–48)
MAGNESIUM SERPL-MCNC: 2.1 MG/DL (ref 1.6–2.6)
MCH RBC QN AUTO: 27.6 PG (ref 27–31)
MCHC RBC AUTO-ENTMCNC: 32 G/DL (ref 32–36)
MCV RBC AUTO: 86 FL (ref 82–98)
MONOCYTES # BLD AUTO: 1.7 K/UL (ref 0.3–1)
MONOCYTES NFR BLD: 11.1 % (ref 4–15)
NEUTROPHILS # BLD AUTO: 11.9 K/UL (ref 1.8–7.7)
NEUTROPHILS NFR BLD: 76.8 % (ref 38–73)
NRBC BLD-RTO: 0 /100 WBC
PHOSPHATE SERPL-MCNC: 3.4 MG/DL (ref 2.7–4.5)
PLATELET # BLD AUTO: 133 K/UL (ref 150–450)
PMV BLD AUTO: 12.8 FL (ref 9.2–12.9)
POCT GLUCOSE: 145 MG/DL (ref 70–110)
POCT GLUCOSE: 200 MG/DL (ref 70–110)
POCT GLUCOSE: 228 MG/DL (ref 70–110)
POTASSIUM SERPL-SCNC: 4.1 MMOL/L (ref 3.5–5.1)
PROCALCITONIN SERPL IA-MCNC: 0.2 NG/ML
PROT SERPL-MCNC: 7.1 G/DL (ref 6–8.4)
PROTHROMBIN TIME: 14.2 SEC (ref 9–12.5)
RBC # BLD AUTO: 4.74 M/UL (ref 4.6–6.2)
SODIUM SERPL-SCNC: 139 MMOL/L (ref 136–145)
TACROLIMUS BLD-MCNC: 5 NG/ML (ref 5–15)
TACROLIMUS BLD-MCNC: 6.8 NG/ML (ref 5–15)
TSH SERPL DL<=0.005 MIU/L-ACNC: 0.43 UIU/ML (ref 0.4–4)
WBC # BLD AUTO: 15.47 K/UL (ref 3.9–12.7)

## 2025-01-09 PROCEDURE — 99223 1ST HOSP IP/OBS HIGH 75: CPT | Mod: ,,, | Performed by: PSYCHIATRY & NEUROLOGY

## 2025-01-09 PROCEDURE — 85025 COMPLETE CBC W/AUTO DIFF WBC: CPT

## 2025-01-09 PROCEDURE — 84100 ASSAY OF PHOSPHORUS: CPT

## 2025-01-09 PROCEDURE — 25000003 PHARM REV CODE 250: Performed by: NURSE PRACTITIONER

## 2025-01-09 PROCEDURE — 80053 COMPREHEN METABOLIC PANEL: CPT

## 2025-01-09 PROCEDURE — 83735 ASSAY OF MAGNESIUM: CPT

## 2025-01-09 PROCEDURE — 25000003 PHARM REV CODE 250: Performed by: EMERGENCY MEDICINE

## 2025-01-09 PROCEDURE — 85610 PROTHROMBIN TIME: CPT

## 2025-01-09 PROCEDURE — 25000003 PHARM REV CODE 250

## 2025-01-09 PROCEDURE — 83036 HEMOGLOBIN GLYCOSYLATED A1C: CPT

## 2025-01-09 PROCEDURE — 85730 THROMBOPLASTIN TIME PARTIAL: CPT

## 2025-01-09 PROCEDURE — 63600175 PHARM REV CODE 636 W HCPCS

## 2025-01-09 PROCEDURE — 11000001 HC ACUTE MED/SURG PRIVATE ROOM

## 2025-01-09 PROCEDURE — 80197 ASSAY OF TACROLIMUS: CPT

## 2025-01-09 PROCEDURE — 95819 EEG AWAKE AND ASLEEP: CPT | Mod: 26,,, | Performed by: STUDENT IN AN ORGANIZED HEALTH CARE EDUCATION/TRAINING PROGRAM

## 2025-01-09 PROCEDURE — 95819 EEG AWAKE AND ASLEEP: CPT

## 2025-01-09 RX ORDER — MUPIROCIN 20 MG/G
OINTMENT TOPICAL 2 TIMES DAILY
Status: DISPENSED | OUTPATIENT
Start: 2025-01-09 | End: 2025-01-14

## 2025-01-09 RX ORDER — HYDROCODONE BITARTRATE AND ACETAMINOPHEN 7.5; 325 MG/1; MG/1
1 TABLET ORAL EVERY 6 HOURS PRN
Status: DISCONTINUED | OUTPATIENT
Start: 2025-01-09 | End: 2025-01-14 | Stop reason: HOSPADM

## 2025-01-09 RX ADMIN — SACUBITRIL AND VALSARTAN 1 TABLET: 49; 51 TABLET, FILM COATED ORAL at 09:01

## 2025-01-09 RX ADMIN — EMPAGLIFLOZIN 10 MG: 10 TABLET, FILM COATED ORAL at 08:01

## 2025-01-09 RX ADMIN — SACUBITRIL AND VALSARTAN 1 TABLET: 49; 51 TABLET, FILM COATED ORAL at 08:01

## 2025-01-09 RX ADMIN — APIXABAN 5 MG: 5 TABLET, FILM COATED ORAL at 09:01

## 2025-01-09 RX ADMIN — LEVETIRACETAM 500 MG: 500 TABLET, FILM COATED ORAL at 08:01

## 2025-01-09 RX ADMIN — LEVETIRACETAM 500 MG: 500 TABLET, FILM COATED ORAL at 09:01

## 2025-01-09 RX ADMIN — TACROLIMUS 0.5 MG: 0.5 CAPSULE ORAL at 08:01

## 2025-01-09 RX ADMIN — TACROLIMUS 0.5 MG: 0.5 CAPSULE ORAL at 06:01

## 2025-01-09 RX ADMIN — Medication 6 MG: at 09:01

## 2025-01-09 RX ADMIN — CARVEDILOL 12.5 MG: 12.5 TABLET, FILM COATED ORAL at 06:01

## 2025-01-09 RX ADMIN — PIPERACILLIN SODIUM AND TAZOBACTAM SODIUM 4.5 G: 4; .5 INJECTION, POWDER, FOR SOLUTION INTRAVENOUS at 10:01

## 2025-01-09 RX ADMIN — PIPERACILLIN SODIUM AND TAZOBACTAM SODIUM 4.5 G: 4; .5 INJECTION, POWDER, FOR SOLUTION INTRAVENOUS at 02:01

## 2025-01-09 RX ADMIN — HYDROCODONE BITARTRATE AND ACETAMINOPHEN 1 TABLET: 7.5; 325 TABLET ORAL at 01:01

## 2025-01-09 RX ADMIN — HYDROCODONE BITARTRATE AND ACETAMINOPHEN 1 TABLET: 7.5; 325 TABLET ORAL at 07:01

## 2025-01-09 RX ADMIN — ATORVASTATIN CALCIUM 80 MG: 40 TABLET, FILM COATED ORAL at 08:01

## 2025-01-09 RX ADMIN — CARVEDILOL 12.5 MG: 12.5 TABLET, FILM COATED ORAL at 08:01

## 2025-01-09 RX ADMIN — PREGABALIN 75 MG: 75 CAPSULE ORAL at 09:01

## 2025-01-09 RX ADMIN — MUPIROCIN: 20 OINTMENT TOPICAL at 09:01

## 2025-01-09 RX ADMIN — APIXABAN 5 MG: 5 TABLET, FILM COATED ORAL at 08:01

## 2025-01-09 RX ADMIN — PREGABALIN 75 MG: 75 CAPSULE ORAL at 08:01

## 2025-01-09 RX ADMIN — HYDROCODONE BITARTRATE AND ACETAMINOPHEN 1 TABLET: 7.5; 325 TABLET ORAL at 04:01

## 2025-01-09 NOTE — CONSULTS
Eric Bañuelos - Neurosurgery ([facility])  Neurology  Consult Note    Patient Name: Tej Purdy  MRN: [id number]  Admission Date: 1/8/2025  Hospital Length of Stay: 1 days  Code Status: Full Code   Attending Provider: Fabio Akins MD   Consulting Provider: Farhan Barrientos MD  Primary Care Physician: Kole Sharpe MD  Principal Problem:Seizure-like activity    Inpatient consult to Neurology  Consult performed by: Farhan Barrientos MD  Consult ordered by: Shan Briscoe DO         Subjective:     Chief Complaint:  New onset seizures     HPI:   Mr. Tej Purdy is a 69 year old male with a past history of CASTILLO cirrhosis, cholangiocarcinoma s/p liver transplant in 2022 on tacrolimus, DM2 on insulin, HTN, CAD s/p PCI 12/23, severe AS, HFrEF, persistent atrial fibrillation on eliquis, and PAD that was admitted after being found down by his wife. History is provided by patient and wife via telephone. They report that the patient was in his usual state of health leading up to the morning of 1/8. He woke up late, around noon, and went to the bathroom. Patient remembers nothing past this point. His wife heard a groan and found the patient confused and soiled with vomit/feces. She witnessed less than a minute of seizure like activity described as generalized shaking of all extremities with eyes closed tight. EMS arrived and patient had a self-resolving shaking episode once more. Denies recent infectious symptoms including cough, shortness of breath, fever, chills, nausea, or vomiting. Denies personal and family history of seizure like activity. Denies recent substance use. Of note, patient does take wellbutrin and tacrolimus, and endorses compliance with these meds.    [facility], patient was afebrile but hypertensive up to SBP of 217. CBC remarkable for leukocytosis of 22. CMP was negative for major electrolyte deranagement or sign of end-organ damage. CK elevated to 274. Lactate 1.0. CTH obtained with stable mild  generalized cerebral atrophy without concern for acute bleed or large territory edema. UA/CXR negative for clear sign of infection. Blood cultures pending. CT A/P also unremarkable for infectious source. Started on empiric coverage with Vanc/zosyn. Loaded with keppra 1500mg IV and continued on 500mg BID. Neurology consulted for evaluation of new onset seizures.     Past Medical History:   Diagnosis Date    Acute appendicitis 06/02/2023    Aortic stenosis     Atrial fibrillation     johny/cardioversion on 1/26/24    Calculus of ureter 12/22/2023    CHF (congestive heart failure)     Cholangiocarcinoma 03/16/2022    s/p liver transplant    Cirrhosis 11/23/2020    s/p transplant 2022 for CASTILLO cirrhosis and cholangiocarcinoma    Diabetes mellitus, type 2     c/b Diabetic neuropathy    HTN (hypertension) 11/23/2020    Hyperlipidemia 11/23/2020    Kidney stones 11/23/2020    S/p lithotripsy 2020    PAD (peripheral artery disease)     Skin cancer 11/23/2020       Past Surgical History:   Procedure Laterality Date    ANGIOGRAM, CORONARY, WITH LEFT HEART CATHETERIZATION N/A 12/26/2023    Procedure: Angiogram, Coronary, with Left Heart Cath;  Surgeon: Carlos King MD;  Location: [facility] CATH LAB;  Service: Cardiology;  Laterality: N/A;    AORTIC VALVULOPLASTY N/A 2/1/2024    Procedure: REPAIR, AORTIC VALVE;  Surgeon: Shilo Carrasco MD;  Location: [facility] CATH LAB;  Service: Cardiology;  Laterality: N/A;    COLONOSCOPY  10/2020    ECHOCARDIOGRAM,TRANSESOPHAGEAL N/A 1/26/2024    Procedure: Transesophageal echo (JOHNY) intra-procedure log documentation;  Surgeon: Nick Mathur III, MD;  Location: [facility] EP LAB;  Service: Cardiology;  Laterality: N/A;    ESOPHAGEAL MANOMETRY WITH MEASUREMENT OF IMPEDANCE N/A 7/17/2023    Procedure: MANOMETRY, ESOPHAGUS, WITH IMPEDANCE MEASUREMENT;  Surgeon: Klarissa Zamora MD;  Location: [facility] ENDO (4TH FLR);  Service: Gastroenterology;  Laterality: N/A;  pt diabetic  liver txp  prep  insructions sent to pt via email and portal -    ESOPHAGOGASTRODUODENOSCOPY  10/2020    ESOPHAGOGASTRODUODENOSCOPY N/A 7/1/2021    Procedure: EGD (ESOPHAGOGASTRODUODENOSCOPY);  Surgeon: Jovan David MD;  Location: [facility] ENDO (4TH FLR);  Service: Endoscopy;  Laterality: N/A;  HCC. Listed for liver transplant. EGD for variceal surveillance.  cardiac clearance and blood thinenr approval received, see telephone encounter 6/23/21-BB  fully vaccinated-BB  labs same day of procedure-BB    EXCISION OF HYDROCELE Right     hemorroid surgery      hemorroidectomy      KIDNEY STONE SURGERY      LAPAROSCOPIC APPENDECTOMY N/A 6/2/2023    Procedure: APPENDECTOMY, LAPAROSCOPIC;  Surgeon: Shan Ross MD;  Location: [facility] OR 2ND FLR;  Service: General;  Laterality: N/A;    LEFT HEART CATHETERIZATION N/A 1/27/2021    Procedure: Left heart cath;  Surgeon: Kris Shelton MD;  Location: [facility] CATH LAB;  Service: Cardiology;  Laterality: N/A;    liver mass removal      LIVER TRANSPLANT N/A 1/6/2022    Procedure: TRANSPLANT, LIVER;  Surgeon: Lizet Garcia MD;  Location: [facility] OR 2ND FLR;  Service: Transplant;  Laterality: N/A;    RIGHT HEART CATHETERIZATION Right 5/7/2021    Procedure: INSERTION, CATHETER, RIGHT HEART;  Surgeon: Jaden Schuster MD;  Location: [facility] CATH LAB;  Service: Cardiology;  Laterality: Right;    STENT, DRUG ELUTING, SINGLE VESSEL, CORONARY  12/26/2023    Procedure: Stent, Drug Eluting, Single Vessel, Coronary;  Surgeon: Carlos King MD;  Location: [facility] CATH LAB;  Service: Cardiology;;    TREATMENT OF CARDIAC ARRHYTHMIA N/A 5/19/2021    Procedure: CARDIOVERSION;  Surgeon: Didier Tilley MD;  Location: [facility] EP LAB;  Service: Cardiology;  Laterality: N/A;  a fib, dccv/johny, mac, dm. sscu    TREATMENT OF CARDIAC ARRHYTHMIA N/A 5/31/2021    Procedure: CARDIOVERSION;  Surgeon: Daniel Arambula MD;  Location: [facility] EP LAB;  Service: Cardiology;  Laterality: N/A;  afib, DCCV, anest., DM, 3 prep     TREATMENT OF CARDIAC ARRHYTHMIA N/A 7/7/2021    Procedure: Cardioversion or Defibrillation;  Surgeon: Didier Tilley MD;  Location: [facility] EP LAB;  Service: Cardiology;  Laterality: N/A;  AF, ASHISH (cancel if complaint), DCCV, MAC, DM, 3 Prep    TREATMENT OF CARDIAC ARRHYTHMIA N/A 7/27/2021    Procedure: Cardioversion or Defibrillation;  Surgeon: Didier Tilley MD;  Location: [facility] EP LAB;  Service: Cardiology;  Laterality: N/A;  AF, ASHISH (cx if complaint), DCCV, MAC, DM, 3 Prep    TREATMENT OF CARDIAC ARRHYTHMIA N/A 1/26/2024    Procedure: Cardioversion or Defibrillation;  Surgeon: Koko Knight MD;  Location: [facility] EP LAB;  Service: Cardiology;  Laterality: N/A;  AF, ASHISH, DCCV, MAC, DM, 3 Prep *ADENOSINE TEST* *LIVER TRANSPLANT PATIENT*       Review of patient's allergies indicates:   Allergen Reactions    Bee pollens Swelling     BEE STINGS swells body       No current facility-administered medications on file prior to encounter.     Current Outpatient Medications on File Prior to Encounter   Medication Sig    apixaban (ELIQUIS) 5 mg Tab Take 1 tablet (5 mg total) by mouth 2 (two) times daily.    atorvastatin (LIPITOR) 80 MG tablet Take 1 tablet (80 mg total) by mouth once daily.    blood sugar diagnostic Strp To check BG 2 times daily, to use with insurance preferred meter (patient has a [employer] self-monitoring glucose meter)    blood-glucose meter kit To check BG 2 times daily, to use with insurance preferred meter    carvediloL (COREG) 12.5 MG tablet Take 1 tablet (12.5 mg total) by mouth 2 (two) times daily with meals.    diazePAM (VALIUM) 5 MG tablet Take 1 tablet (5 mg total) by mouth as needed for Anxiety. Take 1 tab 30 min prior to MRI, take 2nd as needed    empagliflozin (JARDIANCE) 10 mg tablet Take 1 tablet (10 mg total) by mouth once daily.    ferrous sulfate (IRON) 325 mg (65 mg iron) Tab tablet Take 1 tablet (325 mg total) by mouth once daily.    furosemide (LASIX) 20 MG tablet Take 1 tablet (20 mg  "total) by mouth once daily.    HYDROcodone-acetaminophen (NORCO) 5-325 mg per tablet Take 1 tablet by mouth every 8 (eight) hours as needed for Pain.    insulin glargine U-100, Lantus, (LANTUS SOLOSTAR U-100 INSULIN) 100 unit/mL (3 mL) InPn pen Inject 22 Units into the skin every evening.    insulin lispro (HUMALOG KWIKPEN INSULIN) 100 unit/mL pen Inject 6 Units into the skin 3 (three) times daily with meals. Plus sliding scale, MDD: 48 units    lancets Misc To check BG 2 times daily, to use with insurance preferred meter    magnesium oxide (MAG-OX) 400 mg (241.3 mg magnesium) tablet Take 1 tablet (400 mg total) by mouth 2 (two) times daily.    multivitamin capsule Take 1 capsule by mouth once daily.    omega-3 fatty acids/fish oil (FISH OIL-OMEGA-3 FATTY ACIDS) 300-1,000 mg capsule Take 1 capsule by mouth once daily.     pantoprazole (PROTONIX) 40 MG tablet Take 1 tablet (40 mg total) by mouth once daily.    pen needle, diabetic (BD ULTRA-FINE GÉNESIS PEN NEEDLE) 32 gauge x 5/32" Ndle Use to inject insulin 4 times daily    pregabalin (LYRICA) 75 MG capsule Take 1 capsule (75 mg total) by mouth 2 (two) times daily.    QUEtiapine (SEROQUEL) 100 MG Tab Take 1 tablet (100 mg total) by mouth every evening.    sacubitriL-valsartan (ENTRESTO) 49-51 mg per tablet Take 1 tablet by mouth 2 (two) times daily.    tacrolimus (PROGRAF) 0.5 MG Cap Take 1 capsule (0.5 mg total) by mouth every 12 (twelve) hours.     Family History       Problem Relation (Age of Onset)    Coronary artery disease Father          Tobacco Use    Smoking status: Former     Current packs/day: 0.00     Types: Cigarettes     Quit date: 1980     Years since quittin.9    Smokeless tobacco: Former   Substance and Sexual Activity    Alcohol use: Not Currently    Drug use: Never    Sexual activity: Not on file     Review of Systems   Constitutional:  Positive for fatigue. Negative for chills and fever.   HENT:  Negative for sinus pain, sneezing, sore " throat and trouble swallowing.    Eyes:  Negative for visual disturbance.   Respiratory:  Negative for shortness of breath.    Gastrointestinal:  Negative for nausea and vomiting.   Neurological:  Positive for seizures, weakness and headaches. Negative for dizziness, tremors, syncope, facial asymmetry, speech difficulty, light-headedness and numbness.     Objective:     Vital Signs (Most Recent):  Temp: 98.1 °F (36.7 °C) (01/09/25 1116)  Pulse: 64 (01/09/25 1501)  Resp: 20 (01/09/25 1333)  BP: (!) 148/69 (01/09/25 1116)  SpO2: (!) 94 % (01/09/25 1116) Vital Signs (24h Range):  Temp:  [98 °F (36.7 °C)-98.9 °F (37.2 °C)] 98.1 °F (36.7 °C)  Pulse:  [] 64  Resp:  [15-23] 20  SpO2:  [91 %-98 %] 94 %  BP: (109-217)/(56-83) 148/69     Weight: 94.7 kg (208 lb 12.4 oz)  Body mass index is 28.32 kg/m².     Physical Exam  Vitals and nursing note reviewed.   Constitutional:       General: He is not in acute distress.     Appearance: Normal appearance. He is ill-appearing.   HENT:      Head: Normocephalic and atraumatic.      Nose: Nose normal.      Mouth/Throat:      Mouth: Mucous membranes are moist.      Pharynx: Oropharynx is clear.      Comments: Bilateral laceration of tongue L>R  Eyes:      General: No scleral icterus.     Conjunctiva/sclera: Conjunctivae normal.   Cardiovascular:      Rate and Rhythm: Normal rate.      Pulses: Normal pulses.   Pulmonary:      Effort: Pulmonary effort is normal. No respiratory distress.   Abdominal:      General: Abdomen is flat. There is no distension.   Musculoskeletal:      Cervical back: Neck supple. No rigidity.      Right lower leg: No edema.      Left lower leg: No edema.   Skin:     General: Skin is warm and dry.   Neurological:      Mental Status: He is alert.      Comments: Alert and oriented x4  Appropriately participating in conversation and follows simple commands without redirection  Poor attention/concentration in the setting of pain    PERRL, VFF  EOMi  Facial  sensation intact  Hearing grossly intact  Face symmetric  Tongue midline  Shoulder shrug symmetric    MOTOR:  RUE: 4+/5 bicep, 4+/5 tricep, 4+/5 hand   LUE: 4+/5 bicep, 4+/5 tricep, 4+/5 hand   RLE: 5/5 iliopsoas, 4+/5 quadricep, 4+/5 hamstring, 4+/5 tibialis anterior, 4+/5 gastrocnemius  LLE: 3/5 iliopsoas, 4/5 quadricep, 4/5 hamstring, 5/5 tibialis anterior, 4+/5 gastrocnemius  **Motor exam of LLE limited by pain    Sensation:  Intact to light touch, temperature, vibration throughout all extremities    DTRs:  R: 2+ bicep, 2+ brachioradialis, 2+ patellar, 1+ achilles  L: 2+ bicep, 2+ brachioradialis, 2+ patellar, 1+ achilles  Downgoing toes bilaterally  No hoffmans    Coordination:  FNF intact                 Significant Labs: Blood Culture:   Recent Labs   Lab 01/08/25  1525   LABBLOO No Growth to date  No Growth to date     CBC:   Recent Labs   Lab 01/08/25  1527 01/09/25  0715   WBC 22.15* 15.47*   HGB 14.0 13.1*   HCT 47.4 40.9   * 133*     CMP:   Recent Labs   Lab 01/08/25  1527 01/09/25  0715   * 108    139   K 4.9 4.1    108   CO2 15* 22*   BUN 32* 28*   CREATININE 1.4 1.4   CALCIUM 9.8 8.8   MG 2.3 2.1   PROT 8.2 7.1   ALBUMIN 4.1 3.6   BILITOT 1.0 1.0   ALKPHOS 113 87   AST 19 25   ALT 13 14   ANIONGAP 16 9     Urine Studies:   Recent Labs   Lab 01/08/25  1746   COLORU Colorless*   APPEARANCEUA Clear   PHUR 6.0   SPECGRAV 1.030   PROTEINUA Negative   GLUCUA 4+*   KETONESU Trace*   BILIRUBINUA Negative   OCCULTUA Negative   NITRITE Negative   LEUKOCYTESUR Negative   RBCUA 1   WBCUA 0   BACTERIA Rare     All pertinent lab results from the past 24 hours have been reviewed.    Significant Imaging: I have reviewed and interpreted all pertinent imaging results/findings within the past 24 hours.  Assessment and Plan:     * Seizure-like activity  This is a 69 year old male with a past history of CASTILLO cirrhosis s/p liver transplant in 2022 on tacrolimus, CAD s/p PCI, severe AS,  persistent atrial fibrillation on eliquis, DM2 on insulin, and HFrEF that is admitted after being found down with seizure like activity at home. Reported semiology and tongue laceration concerning for epileptic event. Patient has a leukocytosis without known source of infection after initial workup; improved overnight after initiation of empiric broad antibiotics. However, leukocytosis could be reactive in setting of recent seizure activity as well. Patient does report taking both wellbutrin and tacrolimus at home, agents known to lower seizure threshold. This episode could represent a provoked seizure in the setting of possible infection while taking agents that lower seizure threshold. Low concern for central source of infection given patient's clinical stability, improvement in mental status, and lack of neck rigidity.     Plan  -Recommend obtaining MRI Brain with/without contrast. Discussed indication for imaging with patient, and he is refusing MRI due to previous bad experience.  -Agree with continued empiric broad spectrum antibiotics in setting of presumed infection.  -Agree with discontinuation of wellbutrin  -Consider transitioning from tacrolimus to another immunosuppressive agent  -PT/OT/SLP to evaluate and treat  -Neurology will continue to follow. Thank you for your consult. Please reach out with questions or changes in the clinical picture.        VTE Risk Mitigation (From admission, onward)           Ordered     apixaban tablet 5 mg  2 times daily         01/08/25 2034     IP VTE HIGH RISK PATIENT  Once         01/08/25 2034     Place sequential compression device  Until discontinued         01/08/25 2034                    Thank you for your consult. I will follow-up with patient. Please contact us if you have any additional questions.    Farhan Barrientos MD  Neurology  Eric lisa - Neurosurgery ([facility])

## 2025-01-09 NOTE — CARE UPDATE
I have reviewed the chart of Tej Purdy who is hospitalized for the following:    Active Hospital Problems    Diagnosis    *Seizure    Hypercoagulable state due to atrial fibrillation     On eliquis      History of skin cancer    Immunocompromised state     s/p liver transplant in 2022 on tacrolimus,       Hypertensive urgency     hypertensive up to SBP of 217   On carvedilol       Chronic systolic heart failure    History of liver transplant    S/P liver transplant    Chronic anticoagulation     On eliquis      Persistent atrial fibrillation    Coronary artery disease involving native coronary artery of native heart without angina pectoris    Type 2 diabetes mellitus, with long-term current use of insulin    Leukocytosis    Primary hypertension        Mary Kay Miles NP  [facility]

## 2025-01-09 NOTE — PROGRESS NOTES
EEG Hook up  No sign of skin breakdown noticed    Skin Integrity: Normal     Dandre Velazquez   01/09/2025 12:06 PM

## 2025-01-09 NOTE — ASSESSMENT & PLAN NOTE
Patient has paroxysmal (<7 days) atrial fibrillation. Patient is currently in sinus rhythm. [id number] Score: 3. Patient currently in A fib. Recently discontinued on amiodarone and started on metoprolol. [id number] score: 3     -- Continued home eliquis  -- Increased Carvedilol  -- Metoprolol  -- Cardiology

## 2025-01-09 NOTE — ASSESSMENT & PLAN NOTE
"Lab Results   Component Value Date    HGBA1C 5.1 09/18/2024     No results for input(s): "POCTGLUCOSE" in the last 72 hours.    - LDSSI  - cardiac/diabetic diet  - POCT glucose checks TIDWM and qhs  - goal glucose 140-180    "

## 2025-01-09 NOTE — PROCEDURES
VIDEO ELECTROENCEPHALOGRAM  REPORT    DATE OF SERVICE:1/9/25  EEG NUMBER: [id number]   REQUESTED BY:  Dr Akins  LOCATION OF SERVICE:  [facility]    METHODOLOGY   Electroencephalographic (EEG) recording is with electrodes placed according to the International 10-20 placement system.  Thirty two (32) channels of digital signal (sampling rate of 512/sec) including T1 and T2 was simultaneously recorded from the scalp and may include  EKG, EMG, and/or eye monitors.  Recording band pass was 0.1 to 512 hz.  Digital video recording of the patient is simultaneously recorded with the EEG.  The patient is instructed report clinical symptoms which may occur during the recording session.  EEG and video recording is stored and archived in digital format.  Activation procedures which include photic stimulation, hyperventilation and instructing patients to perform simple task are done in selected patients.   The EEG is displayed on a monitor screen and can be reviewed using different montages.  Computer assisted analysis is employed to detect spike and electrographic seizure activity.   The entire record is submitted for computer analysis.  The entire recording is visually reviewed and the times identified by computer analysis as being spikes or seizures are reviewed again.  Compresses spectral analysis (CSA) is also performed on the activity recorded from each individual channel.  This is displayed as a power display of frequencies from 0 to 30 Hz over time.   The CSA is reviewed looking for asymmetries in power between homologous areas of the scalp and then compared with the original EEG recording.     Nanoleaf software was also utilized in the review of this study.  This software suite analyzes the EEG recording in multiple domains.  Coherence and rhythmicity is computed to identify EEG sections which may contain organized seizures.  Each channel undergoes analysis to detect presence of spike and sharp waves which have special and  morphological characteristic of epileptic activity.  The routine EEG recording is converted from spacial into frequency domain.  This is then displayed comparing homologous areas to identify areas of significant asymmetry.  Algorithm to identify non-cortically generated artifact is used to separate eye movement, EMG and other artifact from the EEG.      ELECTROENCEPHALOGRAM:    Indication: 68 yo male s/p liver transplant secondary to cholangiocarcinoma with new onset seizure like activity.  EEG to evaluate for epileptiform discharges.      State of Consciousness:   Awake and asleep    Background:   The background is mildly disorganized, symmetric and continuous.  During periods of wakefulness the background is composed of a mixture of frequencies but primarily in the theta and alpha range with a mildly slow PDR of 7-8 hz, which is poorly sustained.  There is an excessive amount of beta activity noted.  There is intermittent polymorphic theta range slowing seen in the left temporal region.      Sleep:   There are frequent transitions into drowsiness characterized by diffuse slowing and the appearance of vertex waves as well as further transition into stage II sleep.      Epileptiform Abnormalities  None    Seizures/Events:   None    EKG:   Regular rate and rhythm on single lead EKG    Activating procedures:   Hyperventilation and photic stimulation are not performed   Patient noted to be awake but confused by EEG technologist    Impression:   Abnormal study due to focal slowing in the left temporal region as well as mild background slowing.  The background slowing is indicative of a nonspecific mild diffuse encephalopathy.  The presence of intermittent left temporal slowing is nonspecific with regards to etiology, but can be seen in the setting of chronic cerebrovascular disease in patients of this age group.  No epileptiform activity is seen.    Pepper Schafer MD  [facility]   Department of  Neurology/Epilepsy

## 2025-01-09 NOTE — ED NOTES
Provided pt with bedside commode. Perineal care provided; Pt provided with small cup of water which was okayed by provider.

## 2025-01-09 NOTE — SUBJECTIVE & OBJECTIVE
Past Medical History:   Diagnosis Date    Acute appendicitis 06/02/2023    Aortic stenosis     Atrial fibrillation     johny/cardioversion on 1/26/24    Calculus of ureter 12/22/2023    CHF (congestive heart failure)     Cholangiocarcinoma 03/16/2022    s/p liver transplant    Cirrhosis 11/23/2020    s/p transplant 2022 for CASTILLO cirrhosis and cholangiocarcinoma    Diabetes mellitus, type 2     c/b Diabetic neuropathy    HTN (hypertension) 11/23/2020    Hyperlipidemia 11/23/2020    Kidney stones 11/23/2020    S/p lithotripsy 2020    PAD (peripheral artery disease)     Skin cancer 11/23/2020       Past Surgical History:   Procedure Laterality Date    ANGIOGRAM, CORONARY, WITH LEFT HEART CATHETERIZATION N/A 12/26/2023    Procedure: Angiogram, Coronary, with Left Heart Cath;  Surgeon: Carlos King MD;  Location: [facility] CATH LAB;  Service: Cardiology;  Laterality: N/A;    AORTIC VALVULOPLASTY N/A 2/1/2024    Procedure: REPAIR, AORTIC VALVE;  Surgeon: Shilo Carrasco MD;  Location: [facility] CATH LAB;  Service: Cardiology;  Laterality: N/A;    COLONOSCOPY  10/2020    ECHOCARDIOGRAM,TRANSESOPHAGEAL N/A 1/26/2024    Procedure: Transesophageal echo (JOHNY) intra-procedure log documentation;  Surgeon: Nick Mathur III, MD;  Location: [facility] EP LAB;  Service: Cardiology;  Laterality: N/A;    ESOPHAGEAL MANOMETRY WITH MEASUREMENT OF IMPEDANCE N/A 7/17/2023    Procedure: MANOMETRY, ESOPHAGUS, WITH IMPEDANCE MEASUREMENT;  Surgeon: Klarissa Zamora MD;  Location: [facility] ENDO (4TH FLR);  Service: Gastroenterology;  Laterality: N/A;  pt diabetic  liver txp  prep insructions sent to pt via email and portal -    ESOPHAGOGASTRODUODENOSCOPY  10/2020    ESOPHAGOGASTRODUODENOSCOPY N/A 7/1/2021    Procedure: EGD (ESOPHAGOGASTRODUODENOSCOPY);  Surgeon: Jovan David MD;  Location: [facility] ENDO (4TH FLR);  Service: Endoscopy;  Laterality: N/A;  HCC. Listed for liver transplant. EGD for variceal surveillance.  cardiac clearance and  blood thinenr approval received, see telephone encounter 6/23/21-BB  fully vaccinated-BB  labs same day of procedure-BB    EXCISION OF HYDROCELE Right     hemorroid surgery      hemorroidectomy      KIDNEY STONE SURGERY      LAPAROSCOPIC APPENDECTOMY N/A 6/2/2023    Procedure: APPENDECTOMY, LAPAROSCOPIC;  Surgeon: Shan Ross MD;  [facility];  Service: General;  Laterality: N/A;    LEFT HEART CATHETERIZATION N/A 1/27/2021    Procedure: Left heart cath;  Surgeon: Kris Shelton MD;  Location: [facility] CATH LAB;  Service: Cardiology;  Laterality: N/A;    liver mass removal      LIVER TRANSPLANT N/A 1/6/2022    Procedure: TRANSPLANT, LIVER;  Surgeon: Lizet Garcia MD;  [facility];  Service: Transplant;  Laterality: N/A;    RIGHT HEART CATHETERIZATION Right 5/7/2021    Procedure: INSERTION, CATHETER, RIGHT HEART;  Surgeon: Jaden Schuster MD;  Location: [facility] CATH LAB;  Service: Cardiology;  Laterality: Right;    STENT, DRUG ELUTING, SINGLE VESSEL, CORONARY  12/26/2023    Procedure: Stent, Drug Eluting, Single Vessel, Coronary;  Surgeon: Carlos King MD;  Location: [facility] CATH LAB;  Service: Cardiology;;    TREATMENT OF CARDIAC ARRHYTHMIA N/A 5/19/2021    Procedure: CARDIOVERSION;  Surgeon: Didier Tilley MD;  Location: [facility] EP LAB;  Service: Cardiology;  Laterality: N/A;  a fib, dccv/johny, mac, dm. sscu    TREATMENT OF CARDIAC ARRHYTHMIA N/A 5/31/2021    Procedure: CARDIOVERSION;  Surgeon: Daniel Arambula MD;  Location: [facility] EP LAB;  Service: Cardiology;  Laterality: N/A;  afib, DCCV, anest., DM, 3 prep    TREATMENT OF CARDIAC ARRHYTHMIA N/A 7/7/2021    Procedure: Cardioversion or Defibrillation;  Surgeon: Didier Tilley MD;  Location: [facility] EP LAB;  Service: Cardiology;  Laterality: N/A;  AF, JOHNY (cancel if complaint), DCCV, MAC, DM, 3 Prep    TREATMENT OF CARDIAC ARRHYTHMIA N/A 7/27/2021    Procedure: Cardioversion or Defibrillation;  Surgeon: Didier Tilley MD;   Location: [facility] EP LAB;  Service: Cardiology;  Laterality: N/A;  AF, ASHISH (cx if complaint), DCCV, MAC, DM, 3 Prep    TREATMENT OF CARDIAC ARRHYTHMIA N/A 1/26/2024    Procedure: Cardioversion or Defibrillation;  Surgeon: Koko Knight MD;  Location: [facility] EP LAB;  Service: Cardiology;  Laterality: N/A;  AF, ASHISH, DCCV, MAC, DM, 3 Prep *ADENOSINE TEST* *LIVER TRANSPLANT PATIENT*       Review of patient's allergies indicates:   Allergen Reactions    Bee pollens Swelling     BEE STINGS swells body       No current facility-administered medications on file prior to encounter.     Current Outpatient Medications on File Prior to Encounter   Medication Sig    apixaban (ELIQUIS) 5 mg Tab Take 1 tablet (5 mg total) by mouth 2 (two) times daily.    atorvastatin (LIPITOR) 80 MG tablet Take 1 tablet (80 mg total) by mouth once daily.    blood sugar diagnostic Strp To check BG 2 times daily, to use with insurance preferred meter (patient has a [employer] self-monitoring glucose meter)    blood-glucose meter kit To check BG 2 times daily, to use with insurance preferred meter    carvediloL (COREG) 12.5 MG tablet Take 1 tablet (12.5 mg total) by mouth 2 (two) times daily with meals.    diazePAM (VALIUM) 5 MG tablet Take 1 tablet (5 mg total) by mouth as needed for Anxiety. Take 1 tab 30 min prior to MRI, take 2nd as needed    empagliflozin (JARDIANCE) 10 mg tablet Take 1 tablet (10 mg total) by mouth once daily.    ferrous sulfate (IRON) 325 mg (65 mg iron) Tab tablet Take 1 tablet (325 mg total) by mouth once daily.    furosemide (LASIX) 20 MG tablet Take 1 tablet (20 mg total) by mouth once daily.    HYDROcodone-acetaminophen (NORCO) 5-325 mg per tablet Take 1 tablet by mouth every 8 (eight) hours as needed for Pain.    insulin glargine U-100, Lantus, (LANTUS SOLOSTAR U-100 INSULIN) 100 unit/mL (3 mL) InPn pen Inject 22 Units into the skin every evening.    insulin lispro (HUMALOG KWIKPEN INSULIN) 100 unit/mL pen Inject 6 Units  "into the skin 3 (three) times daily with meals. Plus sliding scale, MDD: 48 units    lancets Misc To check BG 2 times daily, to use with insurance preferred meter    magnesium oxide (MAG-OX) 400 mg (241.3 mg magnesium) tablet Take 1 tablet (400 mg total) by mouth 2 (two) times daily.    multivitamin capsule Take 1 capsule by mouth once daily.    omega-3 fatty acids/fish oil (FISH OIL-OMEGA-3 FATTY ACIDS) 300-1,000 mg capsule Take 1 capsule by mouth once daily.     pantoprazole (PROTONIX) 40 MG tablet Take 1 tablet (40 mg total) by mouth once daily.    pen needle, diabetic (BD ULTRA-FINE GÉNESIS PEN NEEDLE) 32 gauge x 5/32" Ndle Use to inject insulin 4 times daily    pregabalin (LYRICA) 75 MG capsule Take 1 capsule (75 mg total) by mouth 2 (two) times daily.    QUEtiapine (SEROQUEL) 100 MG Tab Take 1 tablet (100 mg total) by mouth every evening.    sacubitriL-valsartan (ENTRESTO) 49-51 mg per tablet Take 1 tablet by mouth 2 (two) times daily.    tacrolimus (PROGRAF) 0.5 MG Cap Take 1 capsule (0.5 mg total) by mouth every 12 (twelve) hours.    [DISCONTINUED] buPROPion (WELLBUTRIN XL) 150 MG TB24 tablet Take 1 tablet (150 mg total) by mouth every morning.     Family History       Problem Relation (Age of Onset)    Coronary artery disease Father          Tobacco Use    Smoking status: Former     Current packs/day: 0.00     Types: Cigarettes     Quit date: 1980     Years since quittin.9    Smokeless tobacco: Former   Substance and Sexual Activity    Alcohol use: Not Currently    Drug use: Never    Sexual activity: Not on file     Review of Systems   Constitutional:  Positive for appetite change, chills and fatigue. Negative for fever.   Respiratory:  Negative for chest tightness and shortness of breath.    Cardiovascular:  Negative for chest pain, palpitations and leg swelling.   Gastrointestinal:  Positive for nausea and vomiting. Negative for abdominal pain, constipation and diarrhea.   Genitourinary:  Negative " for difficulty urinating and frequency.   Neurological:  Positive for weakness and headaches. Negative for dizziness.   Psychiatric/Behavioral:  Positive for agitation and confusion.      Objective:     Vital Signs (Most Recent):  Temp: 98.2 °F (36.8 °C) (01/08/25 2102)  Pulse: 87 (01/08/25 2102)  Resp: 15 (01/08/25 2111)  BP: (!) 142/62 (01/08/25 2102)  SpO2: 98 % (01/08/25 2102) Vital Signs (24h Range):  Temp:  [97.2 °F (36.2 °C)-98.3 °F (36.8 °C)] 98.2 °F (36.8 °C)  Pulse:  [] 87  Resp:  [15-23] 15  SpO2:  [91 %-99 %] 98 %  BP: (129-217)/(62-83) 142/62     Weight: 98 kg (216 lb)  Body mass index is 29.29 kg/m².     Physical Exam  Constitutional:       General: He is not in acute distress.     Comments: Confused and agitated    HENT:      Mouth/Throat:      Mouth: Mucous membranes are moist.      Comments: No visualized tongue biting  Eyes:      Extraocular Movements: Extraocular movements intact.      Conjunctiva/sclera: Conjunctivae normal.      Pupils: Pupils are equal, round, and reactive to light.   Cardiovascular:      Rate and Rhythm: Normal rate and regular rhythm.      Pulses: Normal pulses.      Heart sounds: Murmur heard.   Pulmonary:      Effort: Pulmonary effort is normal. No respiratory distress.      Breath sounds: Normal breath sounds. No wheezing, rhonchi or rales.   Abdominal:      General: Abdomen is flat. There is no distension.      Palpations: Abdomen is soft.      Tenderness: There is no abdominal tenderness.   Musculoskeletal:      Right lower leg: No edema.      Left lower leg: No edema.   Skin:     General: Skin is warm and dry.   Neurological:      Mental Status: He is alert. He is disoriented.      Motor: Weakness present.      Comments: Diffuse weakness. No sensory deficits. No other noted focal neurologic deficits.     Declines to answer orientation questions. Oriented to person.              CRANIAL NERVES     CN III, IV, VI   Pupils are equal, round, and reactive to light.        Significant Labs: All pertinent labs within the past 24 hours have been reviewed.    Significant Imaging: I have reviewed all pertinent imaging results/findings within the past 24 hours.

## 2025-01-09 NOTE — PLAN OF CARE
[facility] - Neurosurgery (Hospital)  Initial Discharge Assessment       Primary Care Provider: Kole Sharpe MD    Admission Diagnosis: Seizure [R56.9]  Chest pain [R07.9]    Admission Date: 1/8/2025  Expected Discharge Date:          Payor: [employer] [facility] / Plan: [employer] / Product Type: Medicare Advantage /     Extended Emergency Contact Information  Primary Emergency Contact: Aniya Berkowitz  Mobile Phone: [phone]  Relation: Significant other              [id number] - [place], [place] - [address]  [address] [place] [address]  Phone: [phone] Fax: [phone]    [facility]  [address] [address]  Phone: [phone] Fax: [phone]    [facility]  [address] [place] [address]  Phone: [phone] Fax: [phone]    [facility]  [address] [address]  Phone: [phone] Fax: [phone]    [id number] [place] - [address] [facility] [address] [facility]  [address]  [place] [address]  Phone: [phone] Fax: [phone]      Initial Assessment (most recent)       Adult Discharge Assessment - 01/09/25 1109          Discharge Assessment    Assessment Type Discharge Planning Assessment (P)                    Discharge Assessment      Assessment Type Discharge Planning Assessment (P)       Confirmed/corrected address, phone number and insurance Yes (P)       Confirmed Demographics Correct on Facesheet (P)       Source of Information patient (P)       Communicated FELISHA with patient/caregiver Date not available/Unable to determine (P)       Reason For Admission Seizure activity     People in Home significant other (P)       Facility Arrived From: home (P)       Do you expect to return to your current living situation? Yes (P)       Do you have help at home or someone to help  you manage your care at home? Yes (P)       Who are your caregiver(s) and their phone number(s)? Aniya Berkowitz s.o., [phone] (P)       Prior to hospitilization cognitive status: Unable to Assess (P)       Current cognitive status: Alert/Oriented (P)       Walking or Climbing Stairs Difficulty yes (P)       Walking or Climbing Stairs -- (P)    Pt c/o now he's having trouble navigating stairs, takes it slow, 1 step at a time.     Mobility Management Has a cane but does not use (P)       Home Accessibility stairs within home (P)       Equipment Currently Used at Home cane, straight;shower chair (P)       Readmission within 30 days? No (P)       Do you currently have service(s) that help you manage your care at home? No (P)       Do you take prescription medications? Yes (P)       Do you have prescription coverage? Yes (P)       Coverage PHN (P)       Do you have any problems affording any of your prescribed medications? No (P)       Who is going to help you get home at discharge? Aniya Berkowitz s.o., [phone] (P)       How do you get to doctors appointments? family or friend will provide (P)       Are you on dialysis? No (P)       Do you take coumadin? No (P)       Discharge Plan A Home (P)       Discharge Plan B Home with family (P)       DME Needed Upon Discharge  none (P)       Discharge Plan discussed with: Patient (P)       Name(s) and Number(s) Aniya Berkowitz s.o., [phone] (P)       Transition of Care Barriers None (P)             Physical Activity     On average, how many days per week do you engage in moderate to strenuous exercise (like a brisk walk)? 0 days (P)       On average, how many minutes do you engage in exercise at this level? 0 min (P)             Financial Resource Strain     How hard is it for you to pay for the very basics like food, housing, medical care, and heating? Not hard at all (P)             Housing Stability     In the last 12 months, was there a time when you were not able to  pay the mortgage or rent on time? No (P)       In the last 12 months, how many places have you lived? 1 (P)       In the last 12 months, was there a time when you did not have a steady place to sleep or slept in a shelter (including now)? No (P)             Transportation Needs     In the past 12 months, has lack of transportation kept you from medical appointments or from getting medications? No (P)       In the past 12 months, has lack of transportation kept you from meetings, work, or from getting things needed for daily living? No (P)             Food Insecurity     Within the past 12 months, you worried that your food would run out before you got the money to buy more. Never true (P)       Within the past 12 months, the food you bought just didn't last and you didn't have money to get more. Never true (P)             Stress     Do you feel stress - tense, restless, nervous, or anxious, or unable to sleep at night because your mind is troubled all the time - these days? Rather much (P)             Social Connections     In a typical week, how many times do you talk on the phone with family, friends, or neighbors? Once a week (P)       How often do you get together with friends or relatives? Once a week (P)       How often do you attend Shinto or Sabianism services? Never (P)       Do you belong to any clubs or organizations such as Shinto groups, unions, fraternal or athletic groups, or school groups? No (P)       How often do you attend meetings of the clubs or organizations you belong to? Never (P)       Are you , , , , never , or living with a partner? Living with partner (P)             Alcohol Use     Q1: How often do you have a drink containing alcohol? Never (P)       Q2: How many drinks containing alcohol do you have on a typical day when you are drinking? Patient does not drink (P)       Q3: How often do you have six or more drinks on one occasion? Never (P)              OTHER     Name(s) of People in Home Aniya Woo s.o., [phone] (P)                      Patient resides with his GEORGI Kwan in a 2nd floor apartment.  He has a shower chair and straight cane and has no HH needs.  Patient is not on HD or coumadin.  Patient will discharge home with GEORGI Kwan when cleared.  She can provide transportation and support as needed.      Discharge Plan A and Plan B have been determined by review of patient's clinical status, future medical and therapeutic needs, and coverage/benefits for post-acute care in coordination with multidisciplinary team members.    Nora Sierra, VINCENT  [facility]  [phone]

## 2025-01-09 NOTE — PROGRESS NOTES
Therapy with vancomycin complete and/or consult discontinued by provider.  Pharmacy will sign off, please re-consult as needed.     Waldo Garcia, PharmD  Ext: [phone]

## 2025-01-09 NOTE — ED NOTES
"Pt reporting 8/10 "kidney" pain, pointing in his lower abdominal area. Marilyn RODRIGUEZ notified.  "

## 2025-01-09 NOTE — HPI
Mr. Tej Purdy is a 69 year old male with a past history of CASTILLO cirrhosis, cholangiocarcinoma s/p liver transplant in 2022 on tacrolimus, DM2 on insulin, HTN, CAD s/p PCI 12/23, severe AS, HFrEF, persistent atrial fibrillation on eliquis, and PAD that was admitted after being found down by his wife. History is provided by patient and wife via telephone. They report that the patient was in his usual state of health leading up to the morning of 1/8. He woke up late, around noon, and went to the bathroom. Patient remembers nothing past this point. His wife heard a groan and found the patient confused and soiled with vomit/feces. She witnessed less than a minute of seizure like activity described as generalized shaking of all extremities with eyes closed tight. EMS arrived and patient had a self-resolving shaking episode once more. Denies recent infectious symptoms including cough, shortness of breath, fever, chills, nausea, or vomiting. Denies personal and family history of seizure like activity. Denies recent substance use. Of note, patient does take wellbutrin and tacrolimus, and endorses compliance with these meds.    On presentation to [facility], patient was afebrile but hypertensive up to SBP of 217. CBC remarkable for leukocytosis of 22. CMP was negative for major electrolyte deranagement or sign of end-organ damage. CK elevated to 274. Lactate 1.0. CTH obtained with stable mild generalized cerebral atrophy without concern for acute bleed or large territory edema. UA/CXR negative for clear sign of infection. Blood cultures pending. CT A/P also unremarkable for infectious source. Started on empiric coverage with Vanc/zosyn. Loaded with keppra 1500mg IV and continued on 500mg BID. Neurology consulted for evaluation of new onset seizures.

## 2025-01-09 NOTE — PLAN OF CARE
Problem: Skin Injury Risk Increased  Goal: Skin Health and Integrity  Outcome: Progressing     Problem: Adult Inpatient Plan of Care  Goal: Plan of Care Review  Outcome: Progressing  Goal: Patient-Specific Goal (Individualized)  Outcome: Progressing  Goal: Absence of Hospital-Acquired Illness or Injury  Outcome: Progressing  Goal: Optimal Comfort and Wellbeing  Outcome: Progressing  Goal: Readiness for Transition of Care  Outcome: Progressing     Problem: Diabetes Comorbidity  Goal: Blood Glucose Level Within Targeted Range  Outcome: Progressing     Problem: Fall Injury Risk  Goal: Absence of Fall and Fall-Related Injury  Outcome: Progressing     Problem: Wound  Goal: Optimal Coping  Outcome: Progressing  Goal: Optimal Functional Ability  Outcome: Progressing  Goal: Absence of Infection Signs and Symptoms  Outcome: Progressing  Goal: Improved Oral Intake  Outcome: Progressing  Goal: Optimal Pain Control and Function  Outcome: Progressing  Goal: Skin Health and Integrity  Outcome: Progressing  Goal: Optimal Wound Healing  Outcome: Progressing               POC updated and reviewed with the patient at bedside. Questions regarding POC were encouraged and addressed. VSS, see flowsheets. Tele maintained per provider's order. Patient is AO x 4 at this time. NAEON. Pain management using PRN medications. See MAR for medication administration details. Seizure, fall, and safety precautions maintained. No signs of injury noted over night. Upon exiting room, patient's bed locked in low position, side rails up x 4, bed alarm set, with call light within reach. Instructed patient to call staff for mobility, verbalized understanding.  No acute signs of distress noted at this time.

## 2025-01-09 NOTE — ASSESSMENT & PLAN NOTE
Patient with history of liver transplant on tacrolimus who presents after being found down and then 2 witnessed episodes of seizure like activity. Takes Welbutrin for history of depression. No focal neurologic deficits but patient still acutely confused. Afebrile but WBC 22.1. UA negative. COVID/Flu negative. CT head and CT a/p unremarkable for acute pathology.     Seizures likely in chronically ill patient on medications that can lower seizure threshold (namely Wellbutrin and tacrolimus) and possible infection with increased WBC. Cardiac syncope or orthostatic hypotension unlikely given his likely post ictal confusion but possible.     Plan:  -Loaded with keppra, continue at 500mg BID  -F/u EEG  -Neurology consulted, recs appreciated  -Wellbutrin discontinued  -F/u tacrolimus level  -Pending neurology evaluation, patient may need MRI brain or possibly LP  -F/u infectious workup  -Cardiac monitoring  -Orthostatic vitals

## 2025-01-09 NOTE — SUBJECTIVE & OBJECTIVE
Past Medical History:   Diagnosis Date    Acute appendicitis 06/02/2023    Aortic stenosis     Atrial fibrillation     johny/cardioversion on 1/26/24    Calculus of ureter 12/22/2023    CHF (congestive heart failure)     Cholangiocarcinoma 03/16/2022    s/p liver transplant    Cirrhosis 11/23/2020    s/p transplant 2022 for CASTILLO cirrhosis and cholangiocarcinoma    Diabetes mellitus, type 2     c/b Diabetic neuropathy    HTN (hypertension) 11/23/2020    Hyperlipidemia 11/23/2020    Kidney stones 11/23/2020    S/p lithotripsy 2020    PAD (peripheral artery disease)     Skin cancer 11/23/2020       Past Surgical History:   Procedure Laterality Date    ANGIOGRAM, CORONARY, WITH LEFT HEART CATHETERIZATION N/A 12/26/2023    Procedure: Angiogram, Coronary, with Left Heart Cath;  Surgeon: Carlos King MD;  Location: [facility] CATH LAB;  Service: Cardiology;  Laterality: N/A;    AORTIC VALVULOPLASTY N/A 2/1/2024    Procedure: REPAIR, AORTIC VALVE;  Surgeon: Shilo Carrasco MD;  Location: [facility] CATH LAB;  Service: Cardiology;  Laterality: N/A;    COLONOSCOPY  10/2020    ECHOCARDIOGRAM,TRANSESOPHAGEAL N/A 1/26/2024    Procedure: Transesophageal echo (JOHNY) intra-procedure log documentation;  Surgeon: Nick Mathur III, MD;  Location: [facility] EP LAB;  Service: Cardiology;  Laterality: N/A;    ESOPHAGEAL MANOMETRY WITH MEASUREMENT OF IMPEDANCE N/A 7/17/2023    Procedure: MANOMETRY, ESOPHAGUS, WITH IMPEDANCE MEASUREMENT;  Surgeon: Klarissa Zamora MD;  Location: [facility] ENDO (4TH FLR);  Service: Gastroenterology;  Laterality: N/A;  pt diabetic  liver txp  prep insructions sent to pt via email and portal -    ESOPHAGOGASTRODUODENOSCOPY  10/2020    ESOPHAGOGASTRODUODENOSCOPY N/A 7/1/2021    Procedure: EGD (ESOPHAGOGASTRODUODENOSCOPY);  Surgeon: Jovan David MD;  Location: [facility] ENDO (4TH FLR);  Service: Endoscopy;  Laterality: N/A;  HCC. Listed for liver transplant. EGD for variceal surveillance.  cardiac clearance and  blood thinenr approval received, see telephone encounter 6/23/21-BB  fully vaccinated-BB  labs same day of procedure-BB    EXCISION OF HYDROCELE Right     hemorroid surgery      hemorroidectomy      KIDNEY STONE SURGERY      LAPAROSCOPIC APPENDECTOMY N/A 6/2/2023    Procedure: APPENDECTOMY, LAPAROSCOPIC;  Surgeon: Shan Ross MD;  [facility];  Service: General;  Laterality: N/A;    LEFT HEART CATHETERIZATION N/A 1/27/2021    Procedure: Left heart cath;  Surgeon: Kris Shelton MD;  Location: [facility] CATH LAB;  Service: Cardiology;  Laterality: N/A;    liver mass removal      LIVER TRANSPLANT N/A 1/6/2022    Procedure: TRANSPLANT, LIVER;  Surgeon: Lizet Garcia MD;  [facility];  Service: Transplant;  Laterality: N/A;    RIGHT HEART CATHETERIZATION Right 5/7/2021    Procedure: INSERTION, CATHETER, RIGHT HEART;  Surgeon: Jaden Schuster MD;  Location: [facility] CATH LAB;  Service: Cardiology;  Laterality: Right;    STENT, DRUG ELUTING, SINGLE VESSEL, CORONARY  12/26/2023    Procedure: Stent, Drug Eluting, Single Vessel, Coronary;  Surgeon: Carlos King MD;  Location: [facility] CATH LAB;  Service: Cardiology;;    TREATMENT OF CARDIAC ARRHYTHMIA N/A 5/19/2021    Procedure: CARDIOVERSION;  Surgeon: Didier Tilley MD;  Location: [facility] EP LAB;  Service: Cardiology;  Laterality: N/A;  a fib, dccv/johny, mac, dm. sscu    TREATMENT OF CARDIAC ARRHYTHMIA N/A 5/31/2021    Procedure: CARDIOVERSION;  Surgeon: Daniel Arambula MD;  Location: [facility] EP LAB;  Service: Cardiology;  Laterality: N/A;  afib, DCCV, anest., DM, 3 prep    TREATMENT OF CARDIAC ARRHYTHMIA N/A 7/7/2021    Procedure: Cardioversion or Defibrillation;  Surgeon: Didier Tilley MD;  Location: [facility] EP LAB;  Service: Cardiology;  Laterality: N/A;  AF, JOHNY (cancel if complaint), DCCV, MAC, DM, 3 Prep    TREATMENT OF CARDIAC ARRHYTHMIA N/A 7/27/2021    Procedure: Cardioversion or Defibrillation;  Surgeon: Didier Tilley MD;   Location: [facility] EP LAB;  Service: Cardiology;  Laterality: N/A;  AF, ASHISH (cx if complaint), DCCV, MAC, DM, 3 Prep    TREATMENT OF CARDIAC ARRHYTHMIA N/A 1/26/2024    Procedure: Cardioversion or Defibrillation;  Surgeon: Koko Knight MD;  Location: [facility] EP LAB;  Service: Cardiology;  Laterality: N/A;  AF, ASHISH, DCCV, MAC, DM, 3 Prep *ADENOSINE TEST* *LIVER TRANSPLANT PATIENT*       Review of patient's allergies indicates:   Allergen Reactions    Bee pollens Swelling     BEE STINGS swells body       No current facility-administered medications on file prior to encounter.     Current Outpatient Medications on File Prior to Encounter   Medication Sig    apixaban (ELIQUIS) 5 mg Tab Take 1 tablet (5 mg total) by mouth 2 (two) times daily.    atorvastatin (LIPITOR) 80 MG tablet Take 1 tablet (80 mg total) by mouth once daily.    blood sugar diagnostic Strp To check BG 2 times daily, to use with insurance preferred meter (patient has a [employer] self-monitoring glucose meter)    blood-glucose meter kit To check BG 2 times daily, to use with insurance preferred meter    carvediloL (COREG) 12.5 MG tablet Take 1 tablet (12.5 mg total) by mouth 2 (two) times daily with meals.    diazePAM (VALIUM) 5 MG tablet Take 1 tablet (5 mg total) by mouth as needed for Anxiety. Take 1 tab 30 min prior to MRI, take 2nd as needed    empagliflozin (JARDIANCE) 10 mg tablet Take 1 tablet (10 mg total) by mouth once daily.    ferrous sulfate (IRON) 325 mg (65 mg iron) Tab tablet Take 1 tablet (325 mg total) by mouth once daily.    furosemide (LASIX) 20 MG tablet Take 1 tablet (20 mg total) by mouth once daily.    HYDROcodone-acetaminophen (NORCO) 5-325 mg per tablet Take 1 tablet by mouth every 8 (eight) hours as needed for Pain.    insulin glargine U-100, Lantus, (LANTUS SOLOSTAR U-100 INSULIN) 100 unit/mL (3 mL) InPn pen Inject 22 Units into the skin every evening.    insulin lispro (HUMALOG KWIKPEN INSULIN) 100 unit/mL pen Inject 6 Units  "into the skin 3 (three) times daily with meals. Plus sliding scale, MDD: 48 units    lancets Misc To check BG 2 times daily, to use with insurance preferred meter    magnesium oxide (MAG-OX) 400 mg (241.3 mg magnesium) tablet Take 1 tablet (400 mg total) by mouth 2 (two) times daily.    multivitamin capsule Take 1 capsule by mouth once daily.    omega-3 fatty acids/fish oil (FISH OIL-OMEGA-3 FATTY ACIDS) 300-1,000 mg capsule Take 1 capsule by mouth once daily.     pantoprazole (PROTONIX) 40 MG tablet Take 1 tablet (40 mg total) by mouth once daily.    pen needle, diabetic (BD ULTRA-FINE GÉNESIS PEN NEEDLE) 32 gauge x 5/32" Ndle Use to inject insulin 4 times daily    pregabalin (LYRICA) 75 MG capsule Take 1 capsule (75 mg total) by mouth 2 (two) times daily.    QUEtiapine (SEROQUEL) 100 MG Tab Take 1 tablet (100 mg total) by mouth every evening.    sacubitriL-valsartan (ENTRESTO) 49-51 mg per tablet Take 1 tablet by mouth 2 (two) times daily.    tacrolimus (PROGRAF) 0.5 MG Cap Take 1 capsule (0.5 mg total) by mouth every 12 (twelve) hours.     Family History       Problem Relation (Age of Onset)    Coronary artery disease Father          Tobacco Use    Smoking status: Former     Current packs/day: 0.00     Types: Cigarettes     Quit date: 1980     Years since quittin.9    Smokeless tobacco: Former   Substance and Sexual Activity    Alcohol use: Not Currently    Drug use: Never    Sexual activity: Not on file     Review of Systems   Constitutional:  Positive for fatigue. Negative for chills and fever.   HENT:  Negative for sinus pain, sneezing, sore throat and trouble swallowing.    Eyes:  Negative for visual disturbance.   Respiratory:  Negative for shortness of breath.    Gastrointestinal:  Negative for nausea and vomiting.   Neurological:  Positive for seizures, weakness and headaches. Negative for dizziness, tremors, syncope, facial asymmetry, speech difficulty, light-headedness and numbness.     Objective: "     Vital Signs (Most Recent):  Temp: 98.1 °F (36.7 °C) (01/09/25 1116)  Pulse: 64 (01/09/25 1501)  Resp: 20 (01/09/25 1333)  BP: (!) 148/69 (01/09/25 1116)  SpO2: (!) 94 % (01/09/25 1116) Vital Signs (24h Range):  Temp:  [98 °F (36.7 °C)-98.9 °F (37.2 °C)] 98.1 °F (36.7 °C)  Pulse:  [] 64  Resp:  [15-23] 20  SpO2:  [91 %-98 %] 94 %  BP: (109-217)/(56-83) 148/69     Weight: 94.7 kg (208 lb 12.4 oz)  Body mass index is 28.32 kg/m².     Physical Exam  Vitals and nursing note reviewed.   Constitutional:       General: He is not in acute distress.     Appearance: Normal appearance. He is ill-appearing.   HENT:      Head: Normocephalic and atraumatic.      Nose: Nose normal.      Mouth/Throat:      Mouth: Mucous membranes are moist.      Pharynx: Oropharynx is clear.      Comments: Bilateral laceration of tongue L>R  Eyes:      General: No scleral icterus.     Conjunctiva/sclera: Conjunctivae normal.   Cardiovascular:      Rate and Rhythm: Normal rate.      Pulses: Normal pulses.   Pulmonary:      Effort: Pulmonary effort is normal. No respiratory distress.   Abdominal:      General: Abdomen is flat. There is no distension.   Musculoskeletal:      Cervical back: Neck supple. No rigidity.      Right lower leg: No edema.      Left lower leg: No edema.   Skin:     General: Skin is warm and dry.   Neurological:      Mental Status: He is alert.      Comments: Alert and oriented x4  Appropriately participating in conversation and follows simple commands without redirection  Poor attention/concentration in the setting of pain    PERRL, VFF  EOMi  Facial sensation intact  Hearing grossly intact  Face symmetric  Tongue midline  Shoulder shrug symmetric    MOTOR:  RUE: 4+/5 bicep, 4+/5 tricep, 4+/5 hand   LUE: 4+/5 bicep, 4+/5 tricep, 4+/5 hand   RLE: 5/5 iliopsoas, 4+/5 quadricep, 4+/5 hamstring, 4+/5 tibialis anterior, 4+/5 gastrocnemius  LLE: 3/5 iliopsoas, 4/5 quadricep, 4/5 hamstring, 5/5 tibialis anterior, 4+/5  gastrocnemius  **Motor exam of LLE limited by pain    Sensation:  Intact to light touch, temperature, vibration throughout all extremities    DTRs:  R: 2+ bicep, 2+ brachioradialis, 2+ patellar, 1+ achilles  L: 2+ bicep, 2+ brachioradialis, 2+ patellar, 1+ achilles  Downgoing toes bilaterally  No hoffmans    Coordination:  FNF intact                 Significant Labs: Blood Culture:   Recent Labs   Lab 01/08/25  1525   LABBLOO No Growth to date  No Growth to date     CBC:   Recent Labs   Lab 01/08/25  1527 01/09/25  0715   WBC 22.15* 15.47*   HGB 14.0 13.1*   HCT 47.4 40.9   * 133*     CMP:   Recent Labs   Lab 01/08/25  1527 01/09/25  0715   * 108    139   K 4.9 4.1    108   CO2 15* 22*   BUN 32* 28*   CREATININE 1.4 1.4   CALCIUM 9.8 8.8   MG 2.3 2.1   PROT 8.2 7.1   ALBUMIN 4.1 3.6   BILITOT 1.0 1.0   ALKPHOS 113 87   AST 19 25   ALT 13 14   ANIONGAP 16 9     Urine Studies:   Recent Labs   Lab 01/08/25  1746   COLORU Colorless*   APPEARANCEUA Clear   PHUR 6.0   SPECGRAV 1.030   PROTEINUA Negative   GLUCUA 4+*   KETONESU Trace*   BILIRUBINUA Negative   OCCULTUA Negative   NITRITE Negative   LEUKOCYTESUR Negative   RBCUA 1   WBCUA 0   BACTERIA Rare     All pertinent lab results from the past 24 hours have been reviewed.    Significant Imaging: I have reviewed and interpreted all pertinent imaging results/findings within the past 24 hours.

## 2025-01-09 NOTE — CARE UPDATE
[facility] Care Support Interaction      I have reviewed the chart of Tej Purdy who is hospitalized for Seizure-like activity. The patient is currently located in the following unit: npu        I have assisted the primary physician in management of the following:      C. difficile - Diarrhea present on admission and tested within 48   MRSA Decolonization - Mupirocin ordered and CHG ordered            Mary Kay Miles NP  [facility]

## 2025-01-09 NOTE — ASSESSMENT & PLAN NOTE
This is a 69 year old male with a past history of CASTILLO cirrhosis s/p liver transplant in 2022 on tacrolimus, CAD s/p PCI, severe AS, persistent atrial fibrillation on eliquis, DM2 on insulin, and HFrEF that is admitted after being found down with seizure like activity at home. Reported semiology and tongue laceration concerning for epileptic event. Patient has a leukocytosis without known source of infection after initial workup; improved overnight after initiation of empiric broad antibiotics. However, leukocytosis could be reactive in setting of recent seizure activity as well. Patient does report taking both wellbutrin and tacrolimus at home, agents known to lower seizure threshold. This episode could represent a provoked seizure in the setting of possible infection while taking agents that lower seizure threshold. Low concern for central source of infection given patient's clinical stability, improvement in mental status, and lack of neck rigidity.     Plan  -Recommend obtaining MRI Brain with/without contrast. Discussed indication for imaging with patient, and he is refusing MRI due to previous bad experience.  -Agree with continued empiric broad spectrum antibiotics in setting of presumed infection.  -Agree with discontinuation of wellbutrin  -Consider transitioning from tacrolimus to another immunosuppressive agent  -PT/OT/SLP to evaluate and treat  -Neurology will continue to follow. Thank you for your consult. Please reach out with questions or changes in the clinical picture.

## 2025-01-09 NOTE — ASSESSMENT & PLAN NOTE
WBC 22.1. Afebrile. History with symptoms possibly concerning for infection including weakness, vomiting after seizure episode and maybe chills but difficulty to distinguish in setting of possible new seizures. UA negative. COVID/Flu negative. CT head and CT a/p unremarkable for acute pathology. CXR and CT a/p unrevealing.    Concern for acute infection in patient with leukocytosis and new onset seizure activity. Unclear source but of note, patient has had a previous admission for leukocytosis related to suspected infection in abdomen before liver transplant. Could just be from seizures.     Plan:  -Continue on broad spectrum vanc and zosyn, tailor with culture data  -F/u blood cultures   -Continuing GDMT in patient with significant heart failure but without signs of hypotension  -Continue to monitor for signs and symptoms of infection

## 2025-01-09 NOTE — H&P
Eric lisa - Neurosurgery ([facility])  [facility] Medicine  History & Physical    Patient Name: Tej Purdy  MRN: [id number]  Patient Class: IP- Inpatient  Admission Date: 1/8/2025  Attending Physician: Freda Keith MD   Primary Care Provider: Kole Sharpe MD         Patient information was obtained from patient, spouse/SO, past medical records, and ER records.     Subjective:     Principal Problem:Seizure-like activity    Chief Complaint:   Chief Complaint   Patient presents with    Seizures     Family activated 911 after finding pt on the floor of residence confused without obvious injury. Pt has brief witnessed seizure while in ems care no meds given pta. Pt arrives postictal.         HPI: Patient is a 68 yo male with a PMH of CASTILLO cirrhosis, cholangiocarcinoma s/p liver transplant in 2022 on tacrolimus, type 2 diabetes mellitus on insulin, hypertension, coronary artery disease s/p LHC +PCI (12/23), severe aortic stenosis, heart failure reduced ejection fraction with LVEF of 20-25%, persistent atrial fibrillation s/p several cardioversions on apixaban, and peripheral arterial disease who presents via EMS after being found down at home. Patient states he remembers falling and never lost consciousness but is currently confused and an unreliable historian. According to ED report, patient's wife was contacted by phone by ED physician. She heard patient moaning from their bathroom and found him confused, covered in feces and vomiting. Wife witnessed seizure like movement while waiting for EMS. She states he was in his normal state of health the previous night and takes his prescribed medications regularly. EMS transported [facility] to [facility] and while in the ambulance, patient had a witnessed seizure like event with tonic-clonic movements that lasted less than a minute and terminated without medication. No known history of seizures. Takes Wellbutrin and Prograf which are seizure threshold lowering medications.  Patient currently alert but confused and declines to answer most questions although does state he is thirsty and cold.     While in the ED: Patient afebrile and HDS. Placed on 2L NC for oxygen saturation in the 90s. No focal neurologic deficits noted but patient with diffuse weakness. Confused and disoriented. WBC 22.1. Bicarb 15. Glucose 261. . UA not suspicious for UTI. CXR without obvious consolidations or opacities. CT head negative for acute stroke or bleed. CT a/p with operative change of liver transplant with continued prominence of the intrahepatic biliary ducts, similar to priors and healing fractures of the 9th through 11th right ribs. Patient was started on broad spectrum antibiotics and loaded with keppra. Admitted to hospital medicine for suspected new onset seizures.    Past Medical History:   Diagnosis Date    Acute appendicitis 06/02/2023    Aortic stenosis     Atrial fibrillation     johny/cardioversion on 1/26/24    Calculus of ureter 12/22/2023    CHF (congestive heart failure)     Cholangiocarcinoma 03/16/2022    s/p liver transplant    Cirrhosis 11/23/2020    s/p transplant 2022 for CASTILLO cirrhosis and cholangiocarcinoma    Diabetes mellitus, type 2     c/b Diabetic neuropathy    HTN (hypertension) 11/23/2020    Hyperlipidemia 11/23/2020    Kidney stones 11/23/2020    S/p lithotripsy 2020    PAD (peripheral artery disease)     Skin cancer 11/23/2020       Past Surgical History:   Procedure Laterality Date    ANGIOGRAM, CORONARY, WITH LEFT HEART CATHETERIZATION N/A 12/26/2023    Procedure: Angiogram, Coronary, with Left Heart Cath;  Surgeon: Carlos King MD;  Location: [facility] CATH LAB;  Service: Cardiology;  Laterality: N/A;    AORTIC VALVULOPLASTY N/A 2/1/2024    Procedure: REPAIR, AORTIC VALVE;  Surgeon: Shilo Carrasco MD;  Location: [facility] CATH LAB;  Service: Cardiology;  Laterality: N/A;    COLONOSCOPY  10/2020    ECHOCARDIOGRAM,TRANSESOPHAGEAL N/A 1/26/2024    Procedure:  Transesophageal echo (ASHISH) intra-procedure log documentation;  Surgeon: Nick Mathur III, MD;  Location: [facility] EP LAB;  Service: Cardiology;  Laterality: N/A;    ESOPHAGEAL MANOMETRY WITH MEASUREMENT OF IMPEDANCE N/A 7/17/2023    Procedure: MANOMETRY, ESOPHAGUS, WITH IMPEDANCE MEASUREMENT;  Surgeon: Klarissa Zamora MD;  Location: [facility] ENDO (4TH FLR);  Service: Gastroenterology;  Laterality: N/A;  pt diabetic  liver txp  prep insructions sent to pt via email and portal -Jm    ESOPHAGOGASTRODUODENOSCOPY  10/2020    ESOPHAGOGASTRODUODENOSCOPY N/A 7/1/2021    Procedure: EGD (ESOPHAGOGASTRODUODENOSCOPY);  Surgeon: Jovan David MD;  Location: [facility] ENDO (4TH FLR);  Service: Endoscopy;  Laterality: N/A;  HCC. Listed for liver transplant. EGD for variceal surveillance.  cardiac clearance and blood thinenr approval received, see telephone encounter 6/23/21-BB  fully vaccinated-BB  labs same day of procedure-BB    EXCISION OF HYDROCELE Right     hemorroid surgery      hemorroidectomy      KIDNEY STONE SURGERY      LAPAROSCOPIC APPENDECTOMY N/A 6/2/2023    Procedure: APPENDECTOMY, LAPAROSCOPIC;  Surgeon: Shan Ross MD;  [facility];  Service: General;  Laterality: N/A;    LEFT HEART CATHETERIZATION N/A 1/27/2021    Procedure: Left heart cath;  Surgeon: Kris Shelton MD;  Location: [facility] CATH LAB;  Service: Cardiology;  Laterality: N/A;    liver mass removal      LIVER TRANSPLANT N/A 1/6/2022    Procedure: TRANSPLANT, LIVER;  Surgeon: Lizet Garcia MD;  [facility];  Service: Transplant;  Laterality: N/A;    RIGHT HEART CATHETERIZATION Right 5/7/2021    Procedure: INSERTION, CATHETER, RIGHT HEART;  Surgeon: Jaden Schuster MD;  Location: [facility] CATH LAB;  Service: Cardiology;  Laterality: Right;    STENT, DRUG ELUTING, SINGLE VESSEL, CORONARY  12/26/2023    Procedure: Stent, Drug Eluting, Single Vessel, Coronary;  Surgeon: Carlos King MD;  Location: [facility] CATH LAB;   Service: Cardiology;;    TREATMENT OF CARDIAC ARRHYTHMIA N/A 5/19/2021    Procedure: CARDIOVERSION;  Surgeon: Didier Tilley MD;  Location: [facility] EP LAB;  Service: Cardiology;  Laterality: N/A;  a fib, dccv/johny, mac, dm. sscu    TREATMENT OF CARDIAC ARRHYTHMIA N/A 5/31/2021    Procedure: CARDIOVERSION;  Surgeon: Daniel Arambula MD;  Location: [facility] EP LAB;  Service: Cardiology;  Laterality: N/A;  afib, DCCV, anest., DM, 3 prep    TREATMENT OF CARDIAC ARRHYTHMIA N/A 7/7/2021    Procedure: Cardioversion or Defibrillation;  Surgeon: Didier Tilley MD;  Location: [facility] EP LAB;  Service: Cardiology;  Laterality: N/A;  AF, JOHNY (cancel if complaint), DCCV, MAC, DM, 3 Prep    TREATMENT OF CARDIAC ARRHYTHMIA N/A 7/27/2021    Procedure: Cardioversion or Defibrillation;  Surgeon: Didier Tilley MD;  Location: [facility] EP LAB;  Service: Cardiology;  Laterality: N/A;  AF, JOHNY (cx if complaint), DCCV, MAC, DM, 3 Prep    TREATMENT OF CARDIAC ARRHYTHMIA N/A 1/26/2024    Procedure: Cardioversion or Defibrillation;  Surgeon: Koko Knight MD;  Location: [facility] EP LAB;  Service: Cardiology;  Laterality: N/A;  AF, JOHNY, DCCV, MAC, DM, 3 Prep *ADENOSINE TEST* *LIVER TRANSPLANT PATIENT*       Review of patient's allergies indicates:   Allergen Reactions    Bee pollens Swelling     BEE STINGS swells body       No current facility-administered medications on file prior to encounter.     Current Outpatient Medications on File Prior to Encounter   Medication Sig    apixaban (ELIQUIS) 5 mg Tab Take 1 tablet (5 mg total) by mouth 2 (two) times daily.    atorvastatin (LIPITOR) 80 MG tablet Take 1 tablet (80 mg total) by mouth once daily.    blood sugar diagnostic Strp To check BG 2 times daily, to use with insurance preferred meter (patient has a [employer] self-monitoring glucose meter)    blood-glucose meter kit To check BG 2 times daily, to use with insurance preferred meter    carvediloL (COREG) 12.5 MG tablet Take 1 tablet (12.5 mg total) by  "mouth 2 (two) times daily with meals.    diazePAM (VALIUM) 5 MG tablet Take 1 tablet (5 mg total) by mouth as needed for Anxiety. Take 1 tab 30 min prior to MRI, take 2nd as needed    empagliflozin (JARDIANCE) 10 mg tablet Take 1 tablet (10 mg total) by mouth once daily.    ferrous sulfate (IRON) 325 mg (65 mg iron) Tab tablet Take 1 tablet (325 mg total) by mouth once daily.    furosemide (LASIX) 20 MG tablet Take 1 tablet (20 mg total) by mouth once daily.    HYDROcodone-acetaminophen (NORCO) 5-325 mg per tablet Take 1 tablet by mouth every 8 (eight) hours as needed for Pain.    insulin glargine U-100, Lantus, (LANTUS SOLOSTAR U-100 INSULIN) 100 unit/mL (3 mL) InPn pen Inject 22 Units into the skin every evening.    insulin lispro (HUMALOG KWIKPEN INSULIN) 100 unit/mL pen Inject 6 Units into the skin 3 (three) times daily with meals. Plus sliding scale, MDD: 48 units    lancets Misc To check BG 2 times daily, to use with insurance preferred meter    magnesium oxide (MAG-OX) 400 mg (241.3 mg magnesium) tablet Take 1 tablet (400 mg total) by mouth 2 (two) times daily.    multivitamin capsule Take 1 capsule by mouth once daily.    omega-3 fatty acids/fish oil (FISH OIL-OMEGA-3 FATTY ACIDS) 300-1,000 mg capsule Take 1 capsule by mouth once daily.     pantoprazole (PROTONIX) 40 MG tablet Take 1 tablet (40 mg total) by mouth once daily.    pen needle, diabetic (BD ULTRA-FINE GÉNESIS PEN NEEDLE) 32 gauge x 5/32" Ndle Use to inject insulin 4 times daily    pregabalin (LYRICA) 75 MG capsule Take 1 capsule (75 mg total) by mouth 2 (two) times daily.    QUEtiapine (SEROQUEL) 100 MG Tab Take 1 tablet (100 mg total) by mouth every evening.    sacubitriL-valsartan (ENTRESTO) 49-51 mg per tablet Take 1 tablet by mouth 2 (two) times daily.    tacrolimus (PROGRAF) 0.5 MG Cap Take 1 capsule (0.5 mg total) by mouth every 12 (twelve) hours.    [DISCONTINUED] buPROPion (WELLBUTRIN XL) 150 MG TB24 tablet Take 1 tablet (150 mg total) by " mouth every morning.     Family History       Problem Relation (Age of Onset)    Coronary artery disease Father          Tobacco Use    Smoking status: Former     Current packs/day: 0.00     Types: Cigarettes     Quit date: 1980     Years since quittin.9    Smokeless tobacco: Former   Substance and Sexual Activity    Alcohol use: Not Currently    Drug use: Never    Sexual activity: Not on file     Review of Systems   Constitutional:  Positive for appetite change, chills and fatigue. Negative for fever.   Respiratory:  Negative for chest tightness and shortness of breath.    Cardiovascular:  Negative for chest pain, palpitations and leg swelling.   Gastrointestinal:  Positive for nausea and vomiting. Negative for abdominal pain, constipation and diarrhea.   Genitourinary:  Negative for difficulty urinating and frequency.   Neurological:  Positive for weakness and headaches. Negative for dizziness.   Psychiatric/Behavioral:  Positive for agitation and confusion.      Objective:     Vital Signs (Most Recent):  Temp: 98.2 °F (36.8 °C) (25)  Pulse: 87 (25)  Resp: 15 (25)  BP: (!) 142/62 (25)  SpO2: 98 % (25) Vital Signs (24h Range):  Temp:  [97.2 °F (36.2 °C)-98.3 °F (36.8 °C)] 98.2 °F (36.8 °C)  Pulse:  [] 87  Resp:  [15-23] 15  SpO2:  [91 %-99 %] 98 %  BP: (129-217)/(62-83) 142/62     Weight: 98 kg (216 lb)  Body mass index is 29.29 kg/m².     Physical Exam  Constitutional:       General: He is not in acute distress.     Comments: Confused and agitated    HENT:      Mouth/Throat:      Mouth: Mucous membranes are moist.      Comments: No visualized tongue biting  Eyes:      Extraocular Movements: Extraocular movements intact.      Conjunctiva/sclera: Conjunctivae normal.      Pupils: Pupils are equal, round, and reactive to light.   Cardiovascular:      Rate and Rhythm: Normal rate and regular rhythm.      Pulses: Normal pulses.      Heart sounds:  Murmur heard.   Pulmonary:      Effort: Pulmonary effort is normal. No respiratory distress.      Breath sounds: Normal breath sounds. No wheezing, rhonchi or rales.   Abdominal:      General: Abdomen is flat. There is no distension.      Palpations: Abdomen is soft.      Tenderness: There is no abdominal tenderness.   Musculoskeletal:      Right lower leg: No edema.      Left lower leg: No edema.   Skin:     General: Skin is warm and dry.   Neurological:      Mental Status: He is alert. He is disoriented.      Motor: Weakness present.      Comments: Diffuse weakness. No sensory deficits. No other noted focal neurologic deficits.     Declines to answer orientation questions. Oriented to person.              CRANIAL NERVES     CN III, IV, VI   Pupils are equal, round, and reactive to light.       Significant Labs: All pertinent labs within the past 24 hours have been reviewed.    Significant Imaging: I have reviewed all pertinent imaging results/findings within the past 24 hours.  Assessment/Plan:     * Seizure-like activity  Patient with history of liver transplant on tacrolimus who presents after being found down and then 2 witnessed episodes of seizure like activity. Takes Welbutrin for history of depression. No focal neurologic deficits but patient still acutely confused. Afebrile but WBC 22.1. UA negative. COVID/Flu negative. CT head and CT a/p unremarkable for acute pathology.     Seizures likely in chronically ill patient on medications that can lower seizure threshold (namely Wellbutrin and tacrolimus) and possible infection with increased WBC. Cardiac syncope or orthostatic hypotension unlikely given his likely post ictal confusion but possible.     Plan:  -Loaded with keppra, continue at 500mg BID  -F/u EEG  -Neurology consulted, recs appreciated  -Wellbutrin discontinued  -F/u tacrolimus level  -Pending neurology evaluation, patient may need MRI brain or possibly LP  -F/u infectious workup  -Cardiac  monitoring  -Orthostatic vitals      Leukocytosis  WBC 22.1. Afebrile. History with symptoms possibly concerning for infection including weakness, vomiting after seizure episode and maybe chills but difficulty to distinguish in setting of possible new seizures. UA negative. COVID/Flu negative. CT head and CT a/p unremarkable for acute pathology. CXR and CT a/p unrevealing.    Concern for acute infection in patient with leukocytosis and new onset seizure activity. Unclear source but of note, patient has had a previous admission for leukocytosis related to suspected infection in abdomen before liver transplant. Could just be from seizures.     Plan:  -Continue on broad spectrum vanc and zosyn, tailor with culture data  -F/u blood cultures   -Continuing GDMT in patient with significant heart failure but without signs of hypotension  -Continue to monitor for signs and symptoms of infection    Persistent atrial fibrillation  Patient has paroxysmal (<7 days) atrial fibrillation. Patient is currently in sinus rhythm. [id number] Score: 3. Patient currently in A fib. Recently discontinued on amiodarone and started on metoprolol. [id number] score: 3     -Continued on home eliquis  -Continued on home carvedilol    Chronic HFrEF (heart failure with reduced ejection fraction)  Transthoracic echo (TTE) complete 10/14/2024    Interpretation Summary    Left Ventricle: The left ventricle is normal in size. Normal wall thickness. There is normal systolic function with a visually estimated ejection fraction of 60 - 65%.    Right Ventricle: Normal right ventricular cavity size. Wall thickness is normal. Systolic function is normal.    Left Atrium: Left atrium is mildly dilated.    Aortic Valve: There is severe aortic valve sclerosis. Severely restricted motion. There is severe stenosis. Aortic valve area by VTI is 0.7 cm2. Aortic valve peak velocity is 4.6 m/s. Mean gradient is 47.3 mmHg. The dimensionless index is 0.20. There is mild  "aortic regurgitation.    Mitral Valve: There is mild regurgitation.    Pulmonary Artery: No pulmonary hypertension. The estimated pulmonary artery systolic pressure is 30 mmHg.    IVC/SVC: Normal venous pressure at 3 mmHg.    Previous Echo with EF of 20-25%.    -Continued on GDMT  -Lasix PRN      S/P liver transplant  PMH significant for cholangiocarcinoma (status post liver transplant in 01/2022).  -Continue home tacrolimus  -CMP and tacrolimus level daily  -Consider hepatology consult pending tacrolimus level for management of immunosuppression       Coronary artery disease involving native coronary artery of native heart without angina pectoris  Patient with known CAD. Plavix previously discontinued.  -Continue on home eliquis and statin    Type 2 diabetes mellitus, with long-term current use of insulin  Lab Results   Component Value Date    HGBA1C 5.1 09/18/2024     No results for input(s): "POCTGLUCOSE" in the last 72 hours.    - LDSSI  - cardiac/diabetic diet  - POCT glucose checks TIDWM and qhs  - goal glucose 140-180      Primary hypertension  Patient's blood pressure range in the last 24 hours was: BP  Min: 129/62  Max: 217/83.The patient's inpatient anti-hypertensive regimen is listed below:  Current Antihypertensives  furosemide tablet 20 mg, Daily, Oral  carvediloL tablet 12.5 mg, 2 times daily with meals, Oral    - Continued on home GDMT  -Hold for signs of sepsis or hypotension      VTE Risk Mitigation (From admission, onward)           Ordered     apixaban tablet 5 mg  2 times daily         01/08/25 2034     IP VTE HIGH RISK PATIENT  Once         01/08/25 2034     Place sequential compression device  Until discontinued         01/08/25 2034                               Pharmacokinetic Initial Assessment: IV Vancomycin    Assessment/Plan:    Initiate intravenous vancomycin with loading dose of 1750 mg once, done in ED, followed by a maintenance dose of vancomycin 1750 mg IV every 24 hours.  Desired " "empiric serum trough concentration is 10 to 20 mcg/mL.  Draw vancomycin trough level 60 min prior to third dose on 01/10/2025 at 1730.  Pharmacy will continue to follow and monitor vancomycin.      Please contact pharmacy at extension [phone] with any questions regarding this assessment.     Thank you for the consult,   Britt Perez       Patient brief summary:  Tej Purdy is a 69 y.o. male initiated on antimicrobial therapy with IV Vancomycin for treatment of suspected intra-abdominal infection.    Drug Allergies:   Review of patient's allergies indicates:   Allergen Reactions    Bee pollens Swelling     BEE STINGS swells body       Actual Body Weight:   98 kg    Renal Function:   Estimated Creatinine Clearance: 58.7 mL/min (based on SCr of 1.4 mg/dL).    CBC (last 72 hours):  Recent Labs   Lab Result Units 01/08/25  1527   WBC K/uL 22.15*   Hemoglobin g/dL 14.0   Hematocrit % 47.4   Platelets K/uL 142*   Gran % % 87.4*   Lymph % % 4.4*   Mono % % 6.0   Eosinophil % % 0.4   Basophil % % 0.5   Differential Method  Automated       Metabolic Panel (last 72 hours):  Recent Labs   Lab Result Units 01/08/25  1527 01/08/25  1746   Sodium mmol/L 141  --    Potassium mmol/L 4.9  --    Chloride mmol/L 110  --    CO2 mmol/L 15*  --    Glucose mg/dL 261*  --    Glucose, UA   --  4+*   BUN mg/dL 32*  --    Creatinine mg/dL 1.4  --    Albumin g/dL 4.1  --    Total Bilirubin mg/dL 1.0  --    Alkaline Phosphatase U/L 113  --    AST U/L 19  --    ALT U/L 13  --    Magnesium mg/dL 2.3  --        Drug levels (last 3 results):  No results for input(s): "VANCOMYCINRA", "VANCORANDOM", "VANCOMYCINPE", "VANCOPEAK", "VANCOMYCINTR", "VANCOTROUGH" in the last 72 hours.    Microbiologic Results:  Microbiology Results (last 7 days)       Procedure Component Value Units Date/Time    Blood culture #1 **CANNOT BE ORDERED STAT** [[id number]] Collected: 01/08/25 1525    Order Status: Sent Specimen: Blood from Peripheral, Hand, Left " Updated: 01/08/25 1534    Blood culture #2 **CANNOT BE ORDERED STAT** [[id number]] Collected: 01/08/25 1525    Order Status: Sent Specimen: Blood from Peripheral, Forearm, Right Updated: 01/08/25 1534              Shan Briscoe DO  Department of Hospital Medicine  [facility] - Neurosurgery ([facility])

## 2025-01-09 NOTE — HPI
Patient is a 68 yo male with a PMH of CASTILLO cirrhosis, cholangiocarcinoma s/p liver transplant in 2022 on tacrolimus, type 2 diabetes mellitus on insulin, hypertension, coronary artery disease s/p LHC +PCI (12/23), severe aortic stenosis, heart failure reduced ejection fraction with LVEF of 20-25%, persistent atrial fibrillation s/p several cardioversions on apixaban, and peripheral arterial disease who presents via EMS after being found down at home. Patient states he remembers falling and never lost consciousness but is currently confused and an unreliable historian. According to ED report, patient's wife was contacted by phone by ED physician. She heard patient moaning from their bathroom and found him confused, covered in feces and vomiting. Wife witnessed seizure like movement while waiting for EMS. She states he was in his normal state of health the previous night and takes his prescribed medications regularly. EMS transported him to [facility] and while in the ambulance, patient had a witnessed seizure like event with tonic-clonic movements that lasted less than a minute and terminated without medication. No known history of seizures. Takes Wellbutrin and Prograf which are seizure threshold lowering medications. Patient currently alert but confused and declines to answer most questions although does state he is thirsty and cold.     While in the ED: Patient afebrile and HDS. Placed on 2L NC for oxygen saturation in the 90s. No focal neurologic deficits noted but patient with diffuse weakness. Confused and disoriented. WBC 22.1. Bicarb 15. Glucose 261. . UA not suspicious for UTI. CXR without obvious consolidations or opacities. CT head negative for acute stroke or bleed. CT a/p with operative change of liver transplant with continued prominence of the intrahepatic biliary ducts, similar to priors and healing fractures of the 9th through 11th right ribs. Patient was started on broad spectrum  antibiotics and loaded with keppra. Admitted to hospital medicine for suspected new onset seizures.

## 2025-01-09 NOTE — NURSING
Patient Transferred to NPU Room 906 @2311      Upon arrival to the floor, patient greeted and oriented to room. Complete head to toe assessment completed per protocol. VSS, see flowsheet for details. Neuro assessment completed. Primary team notified of patient's transfer to floor. All current and transfer orders reviewed/reconciled per protocol. All emergency equipment set up in patient's room. Fall/seizure precautions initiated per providers orders. 4 Eyes skin assessment performed, see flowsheets for details. Reviewed assessment and rounding frequency with patient and family. Questions were encouraged and addressed. Repositioned patient for comfort with bed locked in lowest position, side rails up x 4, bed alarm set, and call light within reach. Instructed patient to call staff for mobility/assistance, verbalized understanding. No acute signs of distress noted at this time.

## 2025-01-09 NOTE — ASSESSMENT & PLAN NOTE
PMH significant for cholangiocarcinoma (status post liver transplant in 01/2022).  -Continue home tacrolimus  -CMP and tacrolimus level daily  -Consider hepatology consult pending tacrolimus level for management of immunosuppression

## 2025-01-09 NOTE — ASSESSMENT & PLAN NOTE
Patient's blood pressure range in the last 24 hours was: BP  Min: 129/62  Max: 217/83.The patient's inpatient anti-hypertensive regimen is listed below:  Current Antihypertensives  furosemide tablet 20 mg, Daily, Oral  carvediloL tablet 12.5 mg, 2 times daily with meals, Oral    - Continued on home GDMT  -Hold for signs of sepsis or hypotension

## 2025-01-09 NOTE — PROGRESS NOTES
"Pharmacokinetic Initial Assessment: IV Vancomycin    Assessment/Plan:    Initiate intravenous vancomycin with loading dose of 1750 mg once, done in ED, followed by a maintenance dose of vancomycin 1750 mg IV every 24 hours.  Desired empiric serum trough concentration is 10 to 20 mcg/mL.  Draw vancomycin trough level 60 min prior to third dose on 01/10/2025 at 1730.  Pharmacy will continue to follow and monitor vancomycin.      Please contact pharmacy at extension [phone] with any questions regarding this assessment.     Thank you for the consult,   Britt Perez       Patient brief summary:  Tej Purdy is a 69 y.o. male initiated on antimicrobial therapy with IV Vancomycin for treatment of suspected intra-abdominal infection.    Drug Allergies:   Review of patient's allergies indicates:   Allergen Reactions    Bee pollens Swelling     BEE STINGS swells body       Actual Body Weight:   98 kg    Renal Function:   Estimated Creatinine Clearance: 58.7 mL/min (based on SCr of 1.4 mg/dL).    CBC (last 72 hours):  Recent Labs   Lab Result Units 01/08/25  1527   WBC K/uL 22.15*   Hemoglobin g/dL 14.0   Hematocrit % 47.4   Platelets K/uL 142*   Gran % % 87.4*   Lymph % % 4.4*   Mono % % 6.0   Eosinophil % % 0.4   Basophil % % 0.5   Differential Method  Automated       Metabolic Panel (last 72 hours):  Recent Labs   Lab Result Units 01/08/25  1527 01/08/25  1746   Sodium mmol/L 141  --    Potassium mmol/L 4.9  --    Chloride mmol/L 110  --    CO2 mmol/L 15*  --    Glucose mg/dL 261*  --    Glucose, UA   --  4+*   BUN mg/dL 32*  --    Creatinine mg/dL 1.4  --    Albumin g/dL 4.1  --    Total Bilirubin mg/dL 1.0  --    Alkaline Phosphatase U/L 113  --    AST U/L 19  --    ALT U/L 13  --    Magnesium mg/dL 2.3  --        Drug levels (last 3 results):  No results for input(s): "VANCOMYCINRA", "VANCORANDOM", "VANCOMYCINPE", "VANCOPEAK", "VANCOMYCINTR", "VANCOTROUGH" in the last 72 hours.    Microbiologic " Results:  Microbiology Results (last 7 days)       Procedure Component Value Units Date/Time    Blood culture #1 **CANNOT BE ORDERED STAT** [[id number]] Collected: 01/08/25 1525    Order Status: Sent Specimen: Blood from Peripheral, Hand, Left Updated: 01/08/25 1534    Blood culture #2 **CANNOT BE ORDERED STAT** [[id number]] Collected: 01/08/25 1525    Order Status: Sent Specimen: Blood from Peripheral, Forearm, Right Updated: 01/08/25 1534

## 2025-01-09 NOTE — ASSESSMENT & PLAN NOTE
Patient has paroxysmal (<7 days) atrial fibrillation. Patient is currently in sinus rhythm. [id number] Score: 3. Patient currently in A fib. Recently discontinued on amiodarone and started on metoprolol. [id number] score: 3     -Continued on home eliquis  -Continued on home metoprolol

## 2025-01-09 NOTE — ASSESSMENT & PLAN NOTE
Transthoracic echo (TTE) complete 10/14/2024    Interpretation Summary    Left Ventricle: The left ventricle is normal in size. Normal wall thickness. There is normal systolic function with a visually estimated ejection fraction of 60 - 65%.    Right Ventricle: Normal right ventricular cavity size. Wall thickness is normal. Systolic function is normal.    Left Atrium: Left atrium is mildly dilated.    Aortic Valve: There is severe aortic valve sclerosis. Severely restricted motion. There is severe stenosis. Aortic valve area by VTI is 0.7 cm2. Aortic valve peak velocity is 4.6 m/s. Mean gradient is 47.3 mmHg. The dimensionless index is 0.20. There is mild aortic regurgitation.    Mitral Valve: There is mild regurgitation.    Pulmonary Artery: No pulmonary hypertension. The estimated pulmonary artery systolic pressure is 30 mmHg.    IVC/SVC: Normal venous pressure at 3 mmHg.    Previous Echo with EF of 20-25%.    -Continued on GDMT  -Lasix PRN

## 2025-01-09 NOTE — ASSESSMENT & PLAN NOTE
Transthoracic echo (TTE) complete 10/14/2024    Interpretation Summary    Left Ventricle: The left ventricle is normal in size. Normal wall thickness. There is normal systolic function with a visually estimated ejection fraction of 60 - 65%.    Right Ventricle: Normal right ventricular cavity size. Wall thickness is normal. Systolic function is normal.    Left Atrium: Left atrium is mildly dilated.    Aortic Valve: There is severe aortic valve sclerosis. Severely restricted motion. There is severe stenosis. Aortic valve area by VTI is 0.7 cm2. Aortic valve peak velocity is 4.6 m/s. Mean gradient is 47.3 mmHg. The dimensionless index is 0.20. There is mild aortic regurgitation.    Mitral Valve: There is mild regurgitation.    Pulmonary Artery: No pulmonary hypertension. The estimated pulmonary artery systolic pressure is 30 mmHg.    IVC/SVC: Normal venous pressure at 3 mmHg.    Previous Echo with EF of 20-25%.    -Continued on GDMT  -Lasix PRN

## 2025-01-10 PROBLEM — I48.91 A-FIB: Status: ACTIVE | Noted: 2021-05-14

## 2025-01-10 LAB
ALBUMIN SERPL BCP-MCNC: 3.3 G/DL (ref 3.5–5.2)
ALP SERPL-CCNC: 80 U/L (ref 40–150)
ALT SERPL W/O P-5'-P-CCNC: 12 U/L (ref 10–44)
ANION GAP SERPL CALC-SCNC: 8 MMOL/L (ref 8–16)
APTT PPP: 32.8 SEC (ref 21–32)
AST SERPL-CCNC: 24 U/L (ref 10–40)
BASOPHILS # BLD AUTO: 0.06 K/UL (ref 0–0.2)
BASOPHILS NFR BLD: 0.6 % (ref 0–1.9)
BILIRUB SERPL-MCNC: 0.9 MG/DL (ref 0.1–1)
BUN SERPL-MCNC: 35 MG/DL (ref 8–23)
CALCIUM SERPL-MCNC: 8.5 MG/DL (ref 8.7–10.5)
CHLORIDE SERPL-SCNC: 106 MMOL/L (ref 95–110)
CO2 SERPL-SCNC: 22 MMOL/L (ref 23–29)
CREAT SERPL-MCNC: 1.4 MG/DL (ref 0.5–1.4)
DIFFERENTIAL METHOD BLD: ABNORMAL
EOSINOPHIL # BLD AUTO: 0.2 K/UL (ref 0–0.5)
EOSINOPHIL NFR BLD: 1.6 % (ref 0–8)
ERYTHROCYTE [DISTWIDTH] IN BLOOD BY AUTOMATED COUNT: 15.3 % (ref 11.5–14.5)
EST. GFR  (NO RACE VARIABLE): 53.7 ML/MIN/1.73 M^2
GLUCOSE SERPL-MCNC: 120 MG/DL (ref 70–110)
HCT VFR BLD AUTO: 38.3 % (ref 40–54)
HGB BLD-MCNC: 11.4 G/DL (ref 14–18)
IMM GRANULOCYTES # BLD AUTO: 0.03 K/UL (ref 0–0.04)
IMM GRANULOCYTES NFR BLD AUTO: 0.3 % (ref 0–0.5)
INR PPP: 1.3 (ref 0.8–1.2)
LYMPHOCYTES # BLD AUTO: 1.3 K/UL (ref 1–4.8)
LYMPHOCYTES NFR BLD: 13 % (ref 18–48)
MAGNESIUM SERPL-MCNC: 2.1 MG/DL (ref 1.6–2.6)
MCH RBC QN AUTO: 26.1 PG (ref 27–31)
MCHC RBC AUTO-ENTMCNC: 29.8 G/DL (ref 32–36)
MCV RBC AUTO: 88 FL (ref 82–98)
MONOCYTES # BLD AUTO: 1 K/UL (ref 0.3–1)
MONOCYTES NFR BLD: 10.4 % (ref 4–15)
NEUTROPHILS # BLD AUTO: 7.3 K/UL (ref 1.8–7.7)
NEUTROPHILS NFR BLD: 74.1 % (ref 38–73)
NRBC BLD-RTO: 0 /100 WBC
OHS QRS DURATION: 100 MS
OHS QTC CALCULATION: 435 MS
PHOSPHATE SERPL-MCNC: 3.2 MG/DL (ref 2.7–4.5)
PLATELET # BLD AUTO: 84 K/UL (ref 150–450)
PMV BLD AUTO: 13.1 FL (ref 9.2–12.9)
POCT GLUCOSE: 144 MG/DL (ref 70–110)
POCT GLUCOSE: 171 MG/DL (ref 70–110)
POCT GLUCOSE: 173 MG/DL (ref 70–110)
POCT GLUCOSE: 192 MG/DL (ref 70–110)
POTASSIUM SERPL-SCNC: 3.8 MMOL/L (ref 3.5–5.1)
PROT SERPL-MCNC: 6.4 G/DL (ref 6–8.4)
PROTHROMBIN TIME: 14.3 SEC (ref 9–12.5)
RBC # BLD AUTO: 4.36 M/UL (ref 4.6–6.2)
SODIUM SERPL-SCNC: 136 MMOL/L (ref 136–145)
TACROLIMUS BLD-MCNC: 5.3 NG/ML (ref 5–15)
TROPONIN I SERPL DL<=0.01 NG/ML-MCNC: 17 NG/L (ref 0–35)
TROPONIN I SERPL DL<=0.01 NG/ML-MCNC: 19 NG/L (ref 0–35)
WBC # BLD AUTO: 9.81 K/UL (ref 3.9–12.7)

## 2025-01-10 PROCEDURE — 36415 COLL VENOUS BLD VENIPUNCTURE: CPT | Performed by: STUDENT IN AN ORGANIZED HEALTH CARE EDUCATION/TRAINING PROGRAM

## 2025-01-10 PROCEDURE — 63600175 PHARM REV CODE 636 W HCPCS

## 2025-01-10 PROCEDURE — 85025 COMPLETE CBC W/AUTO DIFF WBC: CPT

## 2025-01-10 PROCEDURE — 99233 SBSQ HOSP IP/OBS HIGH 50: CPT | Mod: ,,, | Performed by: PSYCHIATRY & NEUROLOGY

## 2025-01-10 PROCEDURE — 83735 ASSAY OF MAGNESIUM: CPT

## 2025-01-10 PROCEDURE — 84484 ASSAY OF TROPONIN QUANT: CPT | Mod: 91 | Performed by: NURSE PRACTITIONER

## 2025-01-10 PROCEDURE — 93005 ELECTROCARDIOGRAM TRACING: CPT

## 2025-01-10 PROCEDURE — 36415 COLL VENOUS BLD VENIPUNCTURE: CPT | Performed by: NURSE PRACTITIONER

## 2025-01-10 PROCEDURE — 80053 COMPREHEN METABOLIC PANEL: CPT

## 2025-01-10 PROCEDURE — 11000001 HC ACUTE MED/SURG PRIVATE ROOM

## 2025-01-10 PROCEDURE — 84100 ASSAY OF PHOSPHORUS: CPT

## 2025-01-10 PROCEDURE — 25000003 PHARM REV CODE 250: Performed by: STUDENT IN AN ORGANIZED HEALTH CARE EDUCATION/TRAINING PROGRAM

## 2025-01-10 PROCEDURE — 85730 THROMBOPLASTIN TIME PARTIAL: CPT

## 2025-01-10 PROCEDURE — 94761 N-INVAS EAR/PLS OXIMETRY MLT: CPT

## 2025-01-10 PROCEDURE — 93010 ELECTROCARDIOGRAM REPORT: CPT | Mod: ,,, | Performed by: INTERNAL MEDICINE

## 2025-01-10 PROCEDURE — 25000003 PHARM REV CODE 250: Performed by: EMERGENCY MEDICINE

## 2025-01-10 PROCEDURE — 85610 PROTHROMBIN TIME: CPT

## 2025-01-10 PROCEDURE — 25000003 PHARM REV CODE 250: Performed by: NURSE PRACTITIONER

## 2025-01-10 PROCEDURE — 84484 ASSAY OF TROPONIN QUANT: CPT | Performed by: STUDENT IN AN ORGANIZED HEALTH CARE EDUCATION/TRAINING PROGRAM

## 2025-01-10 PROCEDURE — 80197 ASSAY OF TACROLIMUS: CPT

## 2025-01-10 PROCEDURE — 25000003 PHARM REV CODE 250

## 2025-01-10 RX ORDER — METOPROLOL TARTRATE 1 MG/ML
5 INJECTION, SOLUTION INTRAVENOUS ONCE
Status: COMPLETED | OUTPATIENT
Start: 2025-01-10 | End: 2025-01-10

## 2025-01-10 RX ORDER — CARVEDILOL 25 MG/1
25 TABLET ORAL 2 TIMES DAILY WITH MEALS
Status: DISCONTINUED | OUTPATIENT
Start: 2025-01-10 | End: 2025-01-11

## 2025-01-10 RX ORDER — CARVEDILOL 12.5 MG/1
12.5 TABLET ORAL ONCE
Status: COMPLETED | OUTPATIENT
Start: 2025-01-10 | End: 2025-01-10

## 2025-01-10 RX ORDER — LORAZEPAM 2 MG/ML
2 INJECTION INTRAMUSCULAR ONCE
Status: DISCONTINUED | OUTPATIENT
Start: 2025-01-10 | End: 2025-01-11

## 2025-01-10 RX ADMIN — PREGABALIN 75 MG: 75 CAPSULE ORAL at 09:01

## 2025-01-10 RX ADMIN — HYDROCODONE BITARTRATE AND ACETAMINOPHEN 1 TABLET: 7.5; 325 TABLET ORAL at 09:01

## 2025-01-10 RX ADMIN — CARVEDILOL 12.5 MG: 12.5 TABLET, FILM COATED ORAL at 09:01

## 2025-01-10 RX ADMIN — TACROLIMUS 0.5 MG: 0.5 CAPSULE ORAL at 05:01

## 2025-01-10 RX ADMIN — CARVEDILOL 12.5 MG: 12.5 TABLET, FILM COATED ORAL at 10:01

## 2025-01-10 RX ADMIN — APIXABAN 5 MG: 5 TABLET, FILM COATED ORAL at 09:01

## 2025-01-10 RX ADMIN — SODIUM CHLORIDE 500 ML: 0.9 INJECTION, SOLUTION INTRAVENOUS at 03:01

## 2025-01-10 RX ADMIN — CARVEDILOL 25 MG: 25 TABLET, FILM COATED ORAL at 05:01

## 2025-01-10 RX ADMIN — SODIUM CHLORIDE, POTASSIUM CHLORIDE, SODIUM LACTATE AND CALCIUM CHLORIDE 500 ML: 600; 310; 30; 20 INJECTION, SOLUTION INTRAVENOUS at 05:01

## 2025-01-10 RX ADMIN — ATORVASTATIN CALCIUM 80 MG: 40 TABLET, FILM COATED ORAL at 09:01

## 2025-01-10 RX ADMIN — LEVETIRACETAM 500 MG: 500 TABLET, FILM COATED ORAL at 09:01

## 2025-01-10 RX ADMIN — MUPIROCIN: 20 OINTMENT TOPICAL at 09:01

## 2025-01-10 RX ADMIN — SACUBITRIL AND VALSARTAN 1 TABLET: 49; 51 TABLET, FILM COATED ORAL at 09:01

## 2025-01-10 RX ADMIN — HYDROCODONE BITARTRATE AND ACETAMINOPHEN 1 TABLET: 7.5; 325 TABLET ORAL at 06:01

## 2025-01-10 RX ADMIN — METOPROLOL TARTRATE 5 MG: 1 INJECTION, SOLUTION INTRAVENOUS at 10:01

## 2025-01-10 RX ADMIN — EMPAGLIFLOZIN 10 MG: 10 TABLET, FILM COATED ORAL at 09:01

## 2025-01-10 RX ADMIN — Medication 6 MG: at 09:01

## 2025-01-10 RX ADMIN — TACROLIMUS 0.5 MG: 0.5 CAPSULE ORAL at 09:01

## 2025-01-10 NOTE — PLAN OF CARE
Problem: Skin Injury Risk Increased  Goal: Skin Health and Integrity  Outcome: Progressing     Problem: Adult Inpatient Plan of Care  Goal: Plan of Care Review  Outcome: Progressing  Goal: Patient-Specific Goal (Individualized)  Outcome: Progressing  Goal: Absence of Hospital-Acquired Illness or Injury  Outcome: Progressing  Goal: Optimal Comfort and Wellbeing  Outcome: Progressing  Goal: Readiness for Transition of Care  Outcome: Progressing     Problem: Diabetes Comorbidity  Goal: Blood Glucose Level Within Targeted Range  Outcome: Progressing     Problem: Fall Injury Risk  Goal: Absence of Fall and Fall-Related Injury  Outcome: Progressing     Problem: Wound  Goal: Optimal Coping  Outcome: Progressing  Goal: Optimal Functional Ability  Outcome: Progressing  Goal: Absence of Infection Signs and Symptoms  Outcome: Progressing  Goal: Improved Oral Intake  Outcome: Progressing  Goal: Optimal Pain Control and Function  Outcome: Progressing  Goal: Skin Health and Integrity  Outcome: Progressing  Goal: Optimal Wound Healing  Outcome: Progressing       POC updated and reviewed with the patient at bedside. Questions regarding POC were encouraged and addressed. VSS, see flowsheets. Tele maintained per provider's order. Patient is AO x 4 at this time. NAEON. Pain/Nausea management using PRN medications. See MAR for medication administration details. Seizure, fall, and safety precautions maintained. No signs of injury noted over night. Upon exiting room, patient's bed locked in low position, side rails up x 4, bed alarm set, with call light within reach. Instructed patient to call staff for mobility, verbalized understanding.  No acute signs of distress noted at this time.

## 2025-01-10 NOTE — SUBJECTIVE & OBJECTIVE
Interval History: No active events overnight, vitals were stable, labs were unremarkable. We were notified around 9 AM that the patient was c/o chest pain, dizziness, and was tachycardic. ECG showed an irregular HR with no evidence of MI, and troponin levels were negative. Carvedilol was increased, and IV metoprolol administered.    Review of Systems  Objective:     Vital Signs (Most Recent):  Temp: 98.2 °F (36.8 °C) (01/10/25 1131)  Pulse: (!) 112 (01/10/25 1432)  Resp: 17 (01/10/25 1131)  BP: (!) 93/58 (01/10/25 1131)  SpO2: 97 % (01/10/25 1131) Vital Signs (24h Range):  Temp:  [98.2 °F (36.8 °C)-98.3 °F (36.8 °C)] 98.2 °F (36.8 °C)  Pulse:  [] 112  Resp:  [16-19] 17  SpO2:  [90 %-97 %] 97 %  BP: ()/(51-71) 93/58     Weight: 94.7 kg (208 lb 12.4 oz)  Body mass index is 28.32 kg/m².    Intake/Output Summary (Last 24 hours) at 1/10/2025 1503  Last data filed at 1/9/2025 2155  Gross per 24 hour   Intake 116.04 ml   Output --   Net 116.04 ml         Physical Exam  Constitutional:       General: He is not in acute distress.  HENT:      Mouth/Throat:      Mouth: Mucous membranes are moist.   Eyes:      General: No scleral icterus.     Pupils: Pupils are equal, round, and reactive to light.   Cardiovascular:      Rate and Rhythm: Normal rate and regular rhythm.      Pulses: Normal pulses.      Heart sounds: Murmur heard.   Pulmonary:      Effort: Pulmonary effort is normal. No respiratory distress.      Breath sounds: Normal breath sounds. No wheezing, rhonchi or rales.   Abdominal:      General: Abdomen is flat. There is no distension.      Palpations: Abdomen is soft.      Tenderness: There is no abdominal tenderness.   Musculoskeletal:      Right lower leg: No edema.      Left lower leg: No edema.   Skin:     General: Skin is warm and dry.   Neurological:      Mental Status: He is alert and oriented to person, place, and time.   Psychiatric:         Mood and Affect: Mood normal.         Behavior: Behavior  normal.             Significant Labs: All pertinent labs within the past 24 hours have been reviewed.    Significant Imaging: I have reviewed all pertinent imaging results/findings within the past 24 hours.

## 2025-01-10 NOTE — HOSPITAL COURSE
Admitted with likely seizure that was witnessed by his wife. Patient started on Keppra and Neurology consult appreciated - Concern for seizure related to medication, PRES, illness. Ideally would obtain MRI brain, but the patient initially refused due to claustrophobia and a past unpleasant experience. EEG showed left temporal focal slowing. Neurology again recommended an MRI, and the patient expressed a desire to undergo it under general anesthesia. He was stable during the early morning rounds and was pushing for discharge. However, around 9 AM, we were notified that the patient is c/o chest pain, dizziness, and was tachycardic. ECG showed an irregular HR with no evidence of MI, and troponin levels were negative. He was scheduled for an MRI today. He requests general anesthesia, but the anesthesia team recommended light sedation due to his severe aortic stenosis and low ejection fraction. However, the patient refused. He has now agreed to do the MRI under light sedation but he wants his wife to be present. Patient's MRI showed element of mild amyloid angiopathy with hemorrhages. He is on anticoagulation. As per neuro, he is to remain anticoagulate until he is seen in the [facility] (cerebral angio clinic) with Dr Chawla. Unfortunately, patient became hostile and angry at the nurses out of nowhere and decided to leave AMA despite having very pleasant encounters with him in the morning. He has been exhibiting similar pushy/somewhat edgy behavior since admission as he was demanding things to go his way. Unfortunately, he left AMA and had to be escorted out of the floor with security despite being counseled against doing that. Medications and referrals on discharge have been ordered.

## 2025-01-10 NOTE — PROGRESS NOTES
[facility] - Neurosurgery ([facility])  Neurology  Progress Note    Patient Name: Tej Purdy  MRN: [id number]  Admission Date: 1/8/2025  Hospital Length of Stay: 2 days  Code Status: Full Code   Attending Provider: Fabio Akins MD  Primary Care Physician: Kloe Sharpe MD   Principal Problem:Seizure    HPI:   Mr. Tej Purdy is a 69 year old male with a past history of CASTILOL cirrhosis, cholangiocarcinoma s/p liver transplant in 2022 on tacrolimus, DM2 on insulin, HTN, CAD s/p PCI 12/23, severe AS, HFrEF, persistent atrial fibrillation on eliquis, and PAD that was admitted after being found down by his wife. History is provided by patient and wife via telephone. They report that the patient was in his usual state of health leading up to the morning of 1/8. He woke up late, around noon, and went to the bathroom. Patient remembers nothing past this point. His wife heard a groan and found the patient confused and soiled with vomit/feces. She witnessed less than a minute of seizure like activity described as generalized shaking of all extremities with eyes closed tight. EMS arrived and patient had a self-resolving shaking episode once more. Denies recent infectious symptoms including cough, shortness of breath, fever, chills, nausea, or vomiting. Denies personal and family history of seizure like activity. Denies recent substance use. Of note, patient does take wellbutrin and tacrolimus, and endorses compliance with these meds.    On presentation to [facility], patient was afebrile but hypertensive up to SBP of 217. CBC remarkable for leukocytosis of 22. CMP was negative for major electrolyte deranagement or sign of end-organ damage. CK elevated to 274. Lactate 1.0. CTH obtained with stable mild generalized cerebral atrophy without concern for acute bleed or large territory edema. UA/CXR negative for clear sign of infection. Blood cultures pending. CT A/P also unremarkable for infectious source. Started on empiric  coverage with Vanc/zosyn. Loaded with keppra 1500mg IV and continued on 500mg BID. Neurology consulted for evaluation of new onset seizures.      Subjective:     Interval History: Patient reports that he was feeling much better and more alert today. WBC downtrending to 9. EEG with focal L temporal slowing. He is unable to tolerate MRI without general anesthesia; will pursue scheduling over the weekend.     Current Facility-Administered Medications   Medication Dose Route Frequency Provider Last Rate Last Admin    apixaban tablet 5 mg  5 mg Oral BID Shan Briscoe DO   5 mg at 01/10/25 0949    atorvastatin tablet 80 mg  80 mg Oral Daily Shan Briscoe DO   80 mg at 01/10/25 0950    carvediloL tablet 25 mg  25 mg Oral BID  Fabio Akins MD        dextrose 50% injection 12.5 g  12.5 g Intravenous PRN Shan Briscoe DO        dextrose 50% injection 25 g  25 g Intravenous PRN Shan Briscoe DO        empagliflozin (Jardiance) tablet 10 mg  10 mg Oral Daily Shan Briscoe DO   10 mg at 01/10/25 0950    glucagon (human recombinant) injection 1 mg  1 mg Intramuscular PRN Shan Briscoe DO        glucose chewable tablet 16 g  16 g Oral PRN Shan Briscoe DO        glucose chewable tablet 24 g  24 g Oral PRN Shan Briscoe DO        HYDROcodone-acetaminophen 7.5-325 mg per tablet 1 tablet  1 tablet Oral Q6H PRN Shan Briscoe DO   1 tablet at 01/10/25 0611    insulin aspart U-100 pen 0-5 Units  0-5 Units Subcutaneous QID (AC + HS) PRN Shan Briscoe DO        levETIRAcetam tablet 500 mg  500 mg Oral BID Shan Briscoe DO   500 mg at 01/10/25 0949    LORazepam injection 2 mg  2 mg Intravenous Q2H PRN Susie Green MD        melatonin tablet 6 mg  6 mg Oral Nightly PRN Susie Green MD   6 mg at 01/09/25 2107    mupirocin 2 % ointment   Nasal BID Mary Kay Miles NP   Given at 01/10/25 0900    naloxone 0.4 mg/mL injection 0.02 mg  0.02 mg Intravenous PRN Shan Briscoe DO        pregabalin capsule  75 mg  75 mg Oral BID Shan Briscoe    75 mg at 01/10/25 0949    sacubitriL-valsartan 49-51 mg per tablet 1 tablet  1 tablet Oral BID Shan Briscoe DO   1 tablet at 01/10/25 0949    sodium chloride 0.9% bolus 500 mL 500 mL  500 mL Intravenous Once Mary Kay Miles NP        sodium chloride 0.9% flush 10 mL  10 mL Intravenous PRN Susie Green MD        sodium chloride 0.9% flush 10 mL  10 mL Intravenous Q12H PRN Shan Briscoe DO        tacrolimus capsule 0.5 mg  0.5 mg Oral BID Shan Briscoe    0.5 mg at 01/10/25 0949       Review of Systems   Constitutional:  Positive for chills and fatigue. Negative for fever.   HENT:  Negative for sinus pain, sneezing, sore throat and trouble swallowing.    Eyes:  Negative for visual disturbance.   Respiratory:  Negative for shortness of breath.    Gastrointestinal:  Negative for nausea and vomiting.   Neurological:  Negative for dizziness, tremors, seizures, syncope, facial asymmetry, speech difficulty, weakness, light-headedness, numbness and headaches.     Objective:     Vital Signs (Most Recent):  Temp: 98.2 °F (36.8 °C) (01/10/25 1131)  Pulse: (!) 112 (01/10/25 1432)  Resp: 17 (01/10/25 1131)  BP: (!) 93/58 (01/10/25 1131)  SpO2: 97 % (01/10/25 1131) Vital Signs (24h Range):  Temp:  [98.2 °F (36.8 °C)-98.3 °F (36.8 °C)] 98.2 °F (36.8 °C)  Pulse:  [] 112  Resp:  [16-19] 17  SpO2:  [90 %-97 %] 97 %  BP: ()/(51-71) 93/58     Weight: 94.7 kg (208 lb 12.4 oz)  Body mass index is 28.32 kg/m².     Physical Exam  Vitals and nursing note reviewed.   Constitutional:       General: He is not in acute distress.     Appearance: Normal appearance. He is ill-appearing.   HENT:      Head: Normocephalic and atraumatic.      Nose: Nose normal.      Mouth/Throat:      Mouth: Mucous membranes are moist.      Pharynx: Oropharynx is clear.      Comments: Bilateral laceration of tongue L>R  Eyes:      General: No scleral icterus.     Conjunctiva/sclera: Conjunctivae  normal.   Cardiovascular:      Rate and Rhythm: Normal rate.      Pulses: Normal pulses.   Pulmonary:      Effort: Pulmonary effort is normal. No respiratory distress.   Abdominal:      General: Abdomen is flat. There is no distension.   Musculoskeletal:      Cervical back: Neck supple. No rigidity.      Right lower leg: No edema.      Left lower leg: No edema.   Skin:     General: Skin is warm and dry.   Neurological:      Mental Status: He is alert.      Comments: Alert and oriented x4  Appropriately participating in conversation and follows simple commands without redirection  Good attention/concentration    PERRL, VFF  EOMi  Facial sensation intact  Hearing grossly intact  Face symmetric  Tongue midline  Shoulder shrug symmetric    MOTOR:  RUE: 4+/5 bicep, 4+/5 tricep, 4+/5 hand   LUE: 4+/5 bicep, 4+/5 tricep, 4+/5 hand   RLE: 5/5 iliopsoas, 4+/5 quadricep, 4+/5 hamstring, 4+/5 tibialis anterior, 4+/5 gastrocnemius  LLE: 4/5 iliopsoas, 4/5 quadricep, 4/5 hamstring, 5/5 tibialis anterior, 4+/5 gastrocnemius  **Motor exam of LLE limited by pain    Sensation:  Intact to light touch, temperature, vibration throughout all extremities    DTRs:  R: 2+ bicep, 2+ brachioradialis, 2+ patellar, 1+ achilles  L: 2+ bicep, 2+ brachioradialis, 2+ patellar, 1+ achilles  Downgoing toes bilaterally  No hoffmans    Coordination:  FNF intact                 Significant Labs: CBC:   Recent Labs   Lab 01/08/25  1527 01/09/25  0715 01/10/25  0234   WBC 22.15* 15.47* 9.81   HGB 14.0 13.1* 11.4*   HCT 47.4 40.9 38.3*   * 133* 84*     CMP:   Recent Labs   Lab 01/08/25  1527 01/09/25  0715 01/10/25  0234   * 108 120*    139 136   K 4.9 4.1 3.8    108 106   CO2 15* 22* 22*   BUN 32* 28* 35*   CREATININE 1.4 1.4 1.4   CALCIUM 9.8 8.8 8.5*   MG 2.3 2.1 2.1   PROT 8.2 7.1 6.4   ALBUMIN 4.1 3.6 3.3*   BILITOT 1.0 1.0 0.9   ALKPHOS 113 87 80   AST 19 25 24   ALT 13 14 12   ANIONGAP 16 9 8     All pertinent lab  results from the past 24 hours have been reviewed.    Significant Imaging: I have reviewed and interpreted all pertinent imaging results/findings within the past 24 hours.  Assessment and Plan:     * Seizure  This is a 69 year old male with a past history of CASTILLO cirrhosis s/p liver transplant in 2022 on tacrolimus, CAD s/p PCI, severe AS, persistent atrial fibrillation on eliquis, DM2 on insulin, and HFrEF that is admitted after being found down with seizure like activity at home. Reported semiology and tongue laceration concerning for epileptic event. Patient has a leukocytosis without known source of infection after initial workup; improved overnight after initiation of empiric broad antibiotics. However, leukocytosis could be reactive in setting of recent seizure activity as well. Patient does report taking both wellbutrin and tacrolimus at home, agents known to lower seizure threshold. This episode could represent a provoked seizure in the setting of possible infection while taking agents that lower seizure threshold. Low concern for central source of infection given patient's clinical stability, improvement in mental status, and lack of neck rigidity.     EEG with focal L temporal slowing. CTH with mild area of L tempoparietal hypodensity possibly consistent with remote infarction, but correlating with focal slowing. Other differential includes solid malignancy, abscess, and encephalitis. Leukocytosis has resolved and patient continues to clinically improve although antibiotics have been discontinued. Patient will require MRI with genearl anesthesia given profound claustrophobia and previous traumatic experience.     Plan  -Recommend obtaining MRI Brain epilepsy protocol with/without contrast. Patient agrees to undergo scan with general anesthesia.  -Discussed with anesthesia and placed case request  -continue keppra 500mg BID  -Agree with discontinuation of wellbutrin  -Consider transitioning from tacrolimus  to another immunosuppressive agent  -PT/OT/SLP to evaluate and treat  -Neurology will continue to follow. Thank you for your consult. Please reach out with questions or changes in the clinical picture.        VTE Risk Mitigation (From admission, onward)           Ordered     apixaban tablet 5 mg  2 times daily         01/08/25 2034     IP VTE HIGH RISK PATIENT  Once         01/08/25 2034     Place sequential compression device  Until discontinued         01/08/25 2034                    Farhan Barrientos MD  Neurology  [facility] - Neurosurgery ([facility])

## 2025-01-10 NOTE — ASSESSMENT & PLAN NOTE
This is a 69 year old male with a past history of CASTILLO cirrhosis s/p liver transplant in 2022 on tacrolimus, CAD s/p PCI, severe AS, persistent atrial fibrillation on eliquis, DM2 on insulin, and HFrEF that is admitted after being found down with seizure like activity at home. Reported semiology and tongue laceration concerning for epileptic event. Patient has a leukocytosis without known source of infection after initial workup; improved overnight after initiation of empiric broad antibiotics. However, leukocytosis could be reactive in setting of recent seizure activity as well. Patient does report taking both wellbutrin and tacrolimus at home, agents known to lower seizure threshold. This episode could represent a provoked seizure in the setting of possible infection while taking agents that lower seizure threshold. Low concern for central source of infection given patient's clinical stability, improvement in mental status, and lack of neck rigidity.     EEG with focal L temporal slowing. CTH with mild area of L tempoparietal hypodensity possibly consistent with remote infarction, but correlating with focal slowing. Other differential includes solid malignancy, abscess, and encephalitis. Leukocytosis has resolved and patient continues to clinically improve although antibiotics have been discontinued. Patient will require MRI with genearl anesthesia given profound claustrophobia and previous traumatic experience.     Plan  -Recommend obtaining MRI Brain epilepsy protocol with/without contrast. Patient agrees to undergo scan with general anesthesia.  -Discussed with anesthesia and placed case request  -continue keppra 500mg BID  -Agree with discontinuation of wellbutrin  -Consider transitioning from tacrolimus to another immunosuppressive agent  -PT/OT/SLP to evaluate and treat  -Neurology will continue to follow. Thank you for your consult. Please reach out with questions or changes in the clinical picture.

## 2025-01-10 NOTE — SUBJECTIVE & OBJECTIVE
Subjective:     Interval History: Patient reports that he was feeling much better and more alert today. WBC downtrending to 9. EEG with focal L temporal slowing. He is unable to tolerate MRI without general anesthesia; will pursue scheduling over the weekend.     Current Facility-Administered Medications   Medication Dose Route Frequency Provider Last Rate Last Admin    apixaban tablet 5 mg  5 mg Oral BID Shan Briscoe DO   5 mg at 01/10/25 0949    atorvastatin tablet 80 mg  80 mg Oral Daily Shan Briscoe DO   80 mg at 01/10/25 0950    carvediloL tablet 25 mg  25 mg Oral BID  Fabio Akins MD        dextrose 50% injection 12.5 g  12.5 g Intravenous PRN Shan Briscoe DO        dextrose 50% injection 25 g  25 g Intravenous PRN Shan Briscoe DO        empagliflozin (Jardiance) tablet 10 mg  10 mg Oral Daily Shan Briscoe DO   10 mg at 01/10/25 0950    glucagon (human recombinant) injection 1 mg  1 mg Intramuscular PRN Shan Briscoe DO        glucose chewable tablet 16 g  16 g Oral PRN Shan Briscoe DO        glucose chewable tablet 24 g  24 g Oral PRN Shan Briscoe DO        HYDROcodone-acetaminophen 7.5-325 mg per tablet 1 tablet  1 tablet Oral Q6H PRN Shan Briscoe DO   1 tablet at 01/10/25 0611    insulin aspart U-100 pen 0-5 Units  0-5 Units Subcutaneous QID (AC + HS) PRN Shan Briscoe DO        levETIRAcetam tablet 500 mg  500 mg Oral BID Shan Briscoe DO   500 mg at 01/10/25 0949    LORazepam injection 2 mg  2 mg Intravenous Q2H PRN Susie Green MD        melatonin tablet 6 mg  6 mg Oral Nightly PRN Susie Green MD   6 mg at 01/09/25 2107    mupirocin 2 % ointment   Nasal BID Mary Kay Miles NP   Given at 01/10/25 0900    naloxone 0.4 mg/mL injection 0.02 mg  0.02 mg Intravenous PRN Shan Briscoe DO        pregabalin capsule 75 mg  75 mg Oral BID Shan Briscoe DO   75 mg at 01/10/25 0949    sacubitriL-valsartan 49-51 mg per tablet 1 tablet  1 tablet Oral BID  Shan Briscoe DO   1 tablet at 01/10/25 0949    sodium chloride 0.9% bolus 500 mL 500 mL  500 mL Intravenous Once Mary Kay Miles NP        sodium chloride 0.9% flush 10 mL  10 mL Intravenous PRN Susie Green MD        sodium chloride 0.9% flush 10 mL  10 mL Intravenous Q12H PRN Shan Briscoe DO        tacrolimus capsule 0.5 mg  0.5 mg Oral BID Shan Briscoe DO   0.5 mg at 01/10/25 0949       Review of Systems   Constitutional:  Positive for chills and fatigue. Negative for fever.   HENT:  Negative for sinus pain, sneezing, sore throat and trouble swallowing.    Eyes:  Negative for visual disturbance.   Respiratory:  Negative for shortness of breath.    Gastrointestinal:  Negative for nausea and vomiting.   Neurological:  Negative for dizziness, tremors, seizures, syncope, facial asymmetry, speech difficulty, weakness, light-headedness, numbness and headaches.     Objective:     Vital Signs (Most Recent):  Temp: 98.2 °F (36.8 °C) (01/10/25 1131)  Pulse: (!) 112 (01/10/25 1432)  Resp: 17 (01/10/25 1131)  BP: (!) 93/58 (01/10/25 1131)  SpO2: 97 % (01/10/25 1131) Vital Signs (24h Range):  Temp:  [98.2 °F (36.8 °C)-98.3 °F (36.8 °C)] 98.2 °F (36.8 °C)  Pulse:  [] 112  Resp:  [16-19] 17  SpO2:  [90 %-97 %] 97 %  BP: ()/(51-71) 93/58     Weight: 94.7 kg (208 lb 12.4 oz)  Body mass index is 28.32 kg/m².     Physical Exam  Vitals and nursing note reviewed.   Constitutional:       General: He is not in acute distress.     Appearance: Normal appearance. He is ill-appearing.   HENT:      Head: Normocephalic and atraumatic.      Nose: Nose normal.      Mouth/Throat:      Mouth: Mucous membranes are moist.      Pharynx: Oropharynx is clear.      Comments: Bilateral laceration of tongue L>R  Eyes:      General: No scleral icterus.     Conjunctiva/sclera: Conjunctivae normal.   Cardiovascular:      Rate and Rhythm: Normal rate.      Pulses: Normal pulses.   Pulmonary:      Effort: Pulmonary effort is  normal. No respiratory distress.   Abdominal:      General: Abdomen is flat. There is no distension.   Musculoskeletal:      Cervical back: Neck supple. No rigidity.      Right lower leg: No edema.      Left lower leg: No edema.   Skin:     General: Skin is warm and dry.   Neurological:      Mental Status: He is alert.      Comments: Alert and oriented x4  Appropriately participating in conversation and follows simple commands without redirection  Good attention/concentration    PERRL, VFF  EOMi  Facial sensation intact  Hearing grossly intact  Face symmetric  Tongue midline  Shoulder shrug symmetric    MOTOR:  RUE: 4+/5 bicep, 4+/5 tricep, 4+/5 hand   LUE: 4+/5 bicep, 4+/5 tricep, 4+/5 hand   RLE: 5/5 iliopsoas, 4+/5 quadricep, 4+/5 hamstring, 4+/5 tibialis anterior, 4+/5 gastrocnemius  LLE: 4/5 iliopsoas, 4/5 quadricep, 4/5 hamstring, 5/5 tibialis anterior, 4+/5 gastrocnemius  **Motor exam of LLE limited by pain    Sensation:  Intact to light touch, temperature, vibration throughout all extremities    DTRs:  R: 2+ bicep, 2+ brachioradialis, 2+ patellar, 1+ achilles  L: 2+ bicep, 2+ brachioradialis, 2+ patellar, 1+ achilles  Downgoing toes bilaterally  No hoffmans    Coordination:  FNF intact                 Significant Labs: CBC:   Recent Labs   Lab 01/08/25  1527 01/09/25  0715 01/10/25  0234   WBC 22.15* 15.47* 9.81   HGB 14.0 13.1* 11.4*   HCT 47.4 40.9 38.3*   * 133* 84*     CMP:   Recent Labs   Lab 01/08/25  1527 01/09/25  0715 01/10/25  0234   * 108 120*    139 136   K 4.9 4.1 3.8    108 106   CO2 15* 22* 22*   BUN 32* 28* 35*   CREATININE 1.4 1.4 1.4   CALCIUM 9.8 8.8 8.5*   MG 2.3 2.1 2.1   PROT 8.2 7.1 6.4   ALBUMIN 4.1 3.6 3.3*   BILITOT 1.0 1.0 0.9   ALKPHOS 113 87 80   AST 19 25 24   ALT 13 14 12   ANIONGAP 16 9 8     All pertinent lab results from the past 24 hours have been reviewed.    Significant Imaging: I have reviewed and interpreted all pertinent imaging  results/findings within the past 24 hours.

## 2025-01-10 NOTE — PROGRESS NOTES
[facility] - Neurosurgery ([facility])  [facility]  Progress Note    Patient Name: Tej Purdy  MRN: [id number]  Patient Class: IP- Inpatient   Admission Date: 1/8/2025  Length of Stay: 2 days  Attending Physician: Fabio Akins MD  Primary Care Provider: Kole Sharpe MD        Subjective     Principal Problem:Seizure        HPI:  Patient is a 68 yo male with a PMH of CASTILLO cirrhosis, cholangiocarcinoma s/p liver transplant in 2022 on tacrolimus, type 2 diabetes mellitus on insulin, hypertension, coronary artery disease s/p LHC +PCI (12/23), severe aortic stenosis, heart failure reduced ejection fraction with LVEF of 20-25%, persistent atrial fibrillation s/p several cardioversions on apixaban, and peripheral arterial disease who presents via EMS after being found down at home. Patient states he remembers falling and never lost consciousness but is currently confused and an unreliable historian. According to ED report, patient's wife was contacted by phone by ED physician. She heard patient moaning from their bathroom and found him confused, covered in feces and vomiting. Wife witnessed seizure like movement while waiting for EMS. She states he was in his normal state of health the previous night and takes his prescribed medications regularly. EMS transported him to [facility] and while in the ambulance, patient had a witnessed seizure like event with tonic-clonic movements that lasted less than a minute and terminated without medication. No known history of seizures. Takes Wellbutrin and Prograf which are seizure threshold lowering medications. Patient currently alert but confused and declines to answer most questions although does state he is thirsty and cold.     While in the ED: Patient afebrile and HDS. Placed on 2L NC for oxygen saturation in the 90s. No focal neurologic deficits noted but patient with diffuse weakness. Confused and disoriented. WBC 22.1. Bicarb 15. Glucose 261. . UA  not suspicious for UTI. CXR without obvious consolidations or opacities. CT head negative for acute stroke or bleed. CT a/p with operative change of liver transplant with continued prominence of the intrahepatic biliary ducts, similar to priors and healing fractures of the 9th through 11th right ribs. Patient was started on broad spectrum antibiotics and loaded with keppra. Admitted to hospital medicine for suspected new onset seizures.    Overview/Hospital Course:  Admitted with likely seizure that was witnessed by his wife. Patient started on Keppra and Neurology consult appreciated - Concern for seizure related to medication, PRES, illness. Ideally would obtain MRI brain, but the patient initially refused due to claustrophobia and a past unpleasant experience. EEG showed left temporal focal slowing. Neurology again recommended an MRI, and the patient expressed a desire to undergo it under general anesthesia. He was stable during the early morning rounds and was pushing for discharge. However, around 9 AM, we were notified that the patient is c/o chest pain, dizziness, and was tachycardic. ECG showed an irregular HR with no evidence of MI, and troponin levels were negative. Carvedilol was increased, and IV metoprolol was administered. We will continue to monitor, if A-fib persist, we will consider cardiology consult.      Interval History: No active events overnight, vitals were stable, labs were unremarkable. We were notified around 9 AM that the patient was c/o chest pain, dizziness, and was tachycardic. ECG showed an irregular HR with no evidence of MI, and troponin levels were negative. Carvedilol was increased, and IV metoprolol administered.    Review of Systems  Objective:     Vital Signs (Most Recent):  Temp: 98.2 °F (36.8 °C) (01/10/25 1131)  Pulse: (!) 112 (01/10/25 1432)  Resp: 17 (01/10/25 1131)  BP: (!) 93/58 (01/10/25 1131)  SpO2: 97 % (01/10/25 1131) Vital Signs (24h Range):  Temp:  [98.2 °F (36.8  °C)-98.3 °F (36.8 °C)] 98.2 °F (36.8 °C)  Pulse:  [] 112  Resp:  [16-19] 17  SpO2:  [90 %-97 %] 97 %  BP: ()/(51-71) 93/58     Weight: 94.7 kg (208 lb 12.4 oz)  Body mass index is 28.32 kg/m².    Intake/Output Summary (Last 24 hours) at 1/10/2025 1503  Last data filed at 1/9/2025 2155  Gross per 24 hour   Intake 116.04 ml   Output --   Net 116.04 ml         Physical Exam  Constitutional:       General: He is not in acute distress.  HENT:      Mouth/Throat:      Mouth: Mucous membranes are moist.   Eyes:      General: No scleral icterus.     Pupils: Pupils are equal, round, and reactive to light.   Cardiovascular:      Rate and Rhythm: Normal rate and regular rhythm.      Pulses: Normal pulses.      Heart sounds: Murmur heard.   Pulmonary:      Effort: Pulmonary effort is normal. No respiratory distress.      Breath sounds: Normal breath sounds. No wheezing, rhonchi or rales.   Abdominal:      General: Abdomen is flat. There is no distension.      Palpations: Abdomen is soft.      Tenderness: There is no abdominal tenderness.   Musculoskeletal:      Right lower leg: No edema.      Left lower leg: No edema.   Skin:     General: Skin is warm and dry.   Neurological:      Mental Status: He is alert and oriented to person, place, and time.   Psychiatric:         Mood and Affect: Mood normal.         Behavior: Behavior normal.             Significant Labs: All pertinent labs within the past 24 hours have been reviewed.    Significant Imaging: I have reviewed all pertinent imaging results/findings within the past 24 hours.    Assessment and Plan     * Seizure  Patient with history of liver transplant on tacrolimus who presents after being found down and then 2 witnessed episodes of seizure like activity. Takes Welbutrin for history of depression. No focal neurologic deficits but patient still acutely confused. Afebrile but WBC 22.1. UA negative. COVID/Flu negative. CT head and CT a/p unremarkable for acute  pathology.     Seizures likely in chronically ill patient on medications that can lower seizure threshold (namely Wellbutrin and tacrolimus) and possible infection with increased WBC. Cardiac syncope or orthostatic hypotension unlikely given his likely post ictal confusion but possible.     Plan:  -Loaded with keppra, continue at 500mg BID  -F/u EEG  -Neurology consulted, recs appreciated  -Wellbutrin discontinued  -F/u tacrolimus level  -Pending neurology evaluation, patient may need MRI brain or possibly LP  -F/u infectious workup  -Cardiac monitoring  -Orthostatic vitals      Chronic systolic heart failure  Transthoracic echo (TTE) complete 10/14/2024    Interpretation Summary    Left Ventricle: The left ventricle is normal in size. Normal wall thickness. There is normal systolic function with a visually estimated ejection fraction of 60 - 65%.    Right Ventricle: Normal right ventricular cavity size. Wall thickness is normal. Systolic function is normal.    Left Atrium: Left atrium is mildly dilated.    Aortic Valve: There is severe aortic valve sclerosis. Severely restricted motion. There is severe stenosis. Aortic valve area by VTI is 0.7 cm2. Aortic valve peak velocity is 4.6 m/s. Mean gradient is 47.3 mmHg. The dimensionless index is 0.20. There is mild aortic regurgitation.    Mitral Valve: There is mild regurgitation.    Pulmonary Artery: No pulmonary hypertension. The estimated pulmonary artery systolic pressure is 30 mmHg.    IVC/SVC: Normal venous pressure at 3 mmHg.    Previous Echo with EF of 20-25%.    -Continued on GDMT  -Lasix PRN      S/P liver transplant  PMH significant for cholangiocarcinoma (status post liver transplant in 01/2022).  -Continue home tacrolimus  -CMP and tacrolimus level daily  -Consider hepatology consult pending tacrolimus level for management of immunosuppression       A-fib  Patient has paroxysmal (<7 days) atrial fibrillation. Patient is currently in sinus rhythm.  "[id number] Score: 3. Patient currently in A fib. Recently discontinued on amiodarone and started on metoprolol. [id number] score: 3     -- Continued home eliquis  -- Increased Carvedilol  -- Metoprolol  -- Cardiology        Coronary artery disease involving native coronary artery of native heart without angina pectoris  Patient with known CAD. Plavix previously discontinued.  -Continue on home eliquis and statin    Type 2 diabetes mellitus, with long-term current use of insulin  Lab Results   Component Value Date    HGBA1C 5.1 09/18/2024     No results for input(s): "POCTGLUCOSE" in the last 72 hours.    - LDSSI  - cardiac/diabetic diet  - POCT glucose checks TIDWM and qhs  - goal glucose 140-180      Leukocytosis  WBC 22.1. Afebrile. History with symptoms possibly concerning for infection including weakness, vomiting after seizure episode and maybe chills but difficulty to distinguish in setting of possible new seizures. UA negative. COVID/Flu negative. CT head and CT a/p unremarkable for acute pathology. CXR and CT a/p unrevealing.    Concern for acute infection in patient with leukocytosis and new onset seizure activity. Unclear source but of note, patient has had a previous admission for leukocytosis related to suspected infection in abdomen before liver transplant. Could just be from seizures.     Plan:  -Continue on broad spectrum vanc and zosyn, tailor with culture data  -F/u blood cultures   -Continuing GDMT in patient with significant heart failure but without signs of hypotension  -Continue to monitor for signs and symptoms of infection    Primary hypertension  Patient's blood pressure range in the last 24 hours was: BP  Min: 129/62  Max: 217/83.The patient's inpatient anti-hypertensive regimen is listed below:  Current Antihypertensives  furosemide tablet 20 mg, Daily, Oral  carvediloL tablet 12.5 mg, 2 times daily with meals, Oral    - Continued on home GDMT  -Hold for signs of sepsis or " hypotension      VTE Risk Mitigation (From admission, onward)           Ordered     apixaban tablet 5 mg  2 times daily         01/08/25 2034     IP VTE HIGH RISK PATIENT  Once         01/08/25 2034     Place sequential compression device  Until discontinued         01/08/25 2034                    Discharge Planning   FELISHA: 1/13/2025     Code Status: Full Code   Medical Readiness for Discharge Date:                            Raul Pascual MD  Department of Hospital Medicine   [facility] - Neurosurgery ([facility])

## 2025-01-11 LAB
ALBUMIN SERPL BCP-MCNC: 3 G/DL (ref 3.5–5.2)
ALP SERPL-CCNC: 74 U/L (ref 40–150)
ALT SERPL W/O P-5'-P-CCNC: 13 U/L (ref 10–44)
ANION GAP SERPL CALC-SCNC: 8 MMOL/L (ref 8–16)
APTT PPP: 32.4 SEC (ref 21–32)
AST SERPL-CCNC: 20 U/L (ref 10–40)
BASOPHILS # BLD AUTO: 0.06 K/UL (ref 0–0.2)
BASOPHILS NFR BLD: 0.7 % (ref 0–1.9)
BILIRUB SERPL-MCNC: 0.6 MG/DL (ref 0.1–1)
BUN SERPL-MCNC: 42 MG/DL (ref 8–23)
CALCIUM SERPL-MCNC: 8.4 MG/DL (ref 8.7–10.5)
CHLORIDE SERPL-SCNC: 107 MMOL/L (ref 95–110)
CO2 SERPL-SCNC: 21 MMOL/L (ref 23–29)
CREAT SERPL-MCNC: 1.4 MG/DL (ref 0.5–1.4)
DIFFERENTIAL METHOD BLD: ABNORMAL
EOSINOPHIL # BLD AUTO: 0.2 K/UL (ref 0–0.5)
EOSINOPHIL NFR BLD: 2.5 % (ref 0–8)
ERYTHROCYTE [DISTWIDTH] IN BLOOD BY AUTOMATED COUNT: 15.5 % (ref 11.5–14.5)
EST. GFR  (NO RACE VARIABLE): 53.7 ML/MIN/1.73 M^2
GLUCOSE SERPL-MCNC: 137 MG/DL (ref 70–110)
HCT VFR BLD AUTO: 36.3 % (ref 40–54)
HGB BLD-MCNC: 10.9 G/DL (ref 14–18)
IMM GRANULOCYTES # BLD AUTO: 0.03 K/UL (ref 0–0.04)
IMM GRANULOCYTES NFR BLD AUTO: 0.4 % (ref 0–0.5)
INR PPP: 1.3 (ref 0.8–1.2)
LYMPHOCYTES # BLD AUTO: 1.5 K/UL (ref 1–4.8)
LYMPHOCYTES NFR BLD: 17.9 % (ref 18–48)
MAGNESIUM SERPL-MCNC: 2.1 MG/DL (ref 1.6–2.6)
MCH RBC QN AUTO: 26.5 PG (ref 27–31)
MCHC RBC AUTO-ENTMCNC: 30 G/DL (ref 32–36)
MCV RBC AUTO: 88 FL (ref 82–98)
MONOCYTES # BLD AUTO: 1.1 K/UL (ref 0.3–1)
MONOCYTES NFR BLD: 13.5 % (ref 4–15)
NEUTROPHILS # BLD AUTO: 5.3 K/UL (ref 1.8–7.7)
NEUTROPHILS NFR BLD: 65 % (ref 38–73)
NRBC BLD-RTO: 0 /100 WBC
PHOSPHATE SERPL-MCNC: 4.1 MG/DL (ref 2.7–4.5)
PLATELET # BLD AUTO: 89 K/UL (ref 150–450)
PMV BLD AUTO: 12.8 FL (ref 9.2–12.9)
POCT GLUCOSE: 147 MG/DL (ref 70–110)
POCT GLUCOSE: 164 MG/DL (ref 70–110)
POCT GLUCOSE: 167 MG/DL (ref 70–110)
POCT GLUCOSE: 178 MG/DL (ref 70–110)
POTASSIUM SERPL-SCNC: 4 MMOL/L (ref 3.5–5.1)
PROT SERPL-MCNC: 5.8 G/DL (ref 6–8.4)
PROTHROMBIN TIME: 14.3 SEC (ref 9–12.5)
RBC # BLD AUTO: 4.12 M/UL (ref 4.6–6.2)
SODIUM SERPL-SCNC: 136 MMOL/L (ref 136–145)
TACROLIMUS BLD-MCNC: 4.8 NG/ML (ref 5–15)
WBC # BLD AUTO: 8.14 K/UL (ref 3.9–12.7)

## 2025-01-11 PROCEDURE — 11000001 HC ACUTE MED/SURG PRIVATE ROOM

## 2025-01-11 PROCEDURE — 63600175 PHARM REV CODE 636 W HCPCS: Performed by: STUDENT IN AN ORGANIZED HEALTH CARE EDUCATION/TRAINING PROGRAM

## 2025-01-11 PROCEDURE — 85730 THROMBOPLASTIN TIME PARTIAL: CPT

## 2025-01-11 PROCEDURE — 85610 PROTHROMBIN TIME: CPT

## 2025-01-11 PROCEDURE — 84100 ASSAY OF PHOSPHORUS: CPT

## 2025-01-11 PROCEDURE — 80053 COMPREHEN METABOLIC PANEL: CPT

## 2025-01-11 PROCEDURE — 25000003 PHARM REV CODE 250

## 2025-01-11 PROCEDURE — 85025 COMPLETE CBC W/AUTO DIFF WBC: CPT

## 2025-01-11 PROCEDURE — 63600175 PHARM REV CODE 636 W HCPCS

## 2025-01-11 PROCEDURE — 80197 ASSAY OF TACROLIMUS: CPT

## 2025-01-11 PROCEDURE — 25000003 PHARM REV CODE 250: Performed by: STUDENT IN AN ORGANIZED HEALTH CARE EDUCATION/TRAINING PROGRAM

## 2025-01-11 PROCEDURE — 83735 ASSAY OF MAGNESIUM: CPT

## 2025-01-11 RX ORDER — CARVEDILOL 12.5 MG/1
12.5 TABLET ORAL 2 TIMES DAILY WITH MEALS
Status: DISCONTINUED | OUTPATIENT
Start: 2025-01-11 | End: 2025-01-14 | Stop reason: HOSPADM

## 2025-01-11 RX ORDER — LORAZEPAM 0.5 MG/1
0.5 TABLET ORAL EVERY 6 HOURS PRN
Status: DISCONTINUED | OUTPATIENT
Start: 2025-01-11 | End: 2025-01-14 | Stop reason: HOSPADM

## 2025-01-11 RX ORDER — KETOROLAC TROMETHAMINE 30 MG/ML
15 INJECTION, SOLUTION INTRAMUSCULAR; INTRAVENOUS EVERY 6 HOURS PRN
Status: DISCONTINUED | OUTPATIENT
Start: 2025-01-11 | End: 2025-01-14 | Stop reason: HOSPADM

## 2025-01-11 RX ADMIN — HYDROCODONE BITARTRATE AND ACETAMINOPHEN 1 TABLET: 7.5; 325 TABLET ORAL at 02:01

## 2025-01-11 RX ADMIN — MUPIROCIN: 20 OINTMENT TOPICAL at 08:01

## 2025-01-11 RX ADMIN — ATORVASTATIN CALCIUM 80 MG: 40 TABLET, FILM COATED ORAL at 08:01

## 2025-01-11 RX ADMIN — APIXABAN 5 MG: 5 TABLET, FILM COATED ORAL at 08:01

## 2025-01-11 RX ADMIN — HYDROCODONE BITARTRATE AND ACETAMINOPHEN 1 TABLET: 7.5; 325 TABLET ORAL at 06:01

## 2025-01-11 RX ADMIN — KETOROLAC TROMETHAMINE 15 MG: 30 INJECTION, SOLUTION INTRAMUSCULAR; INTRAVENOUS at 06:01

## 2025-01-11 RX ADMIN — EMPAGLIFLOZIN 10 MG: 10 TABLET, FILM COATED ORAL at 08:01

## 2025-01-11 RX ADMIN — CARVEDILOL 12.5 MG: 12.5 TABLET, FILM COATED ORAL at 05:01

## 2025-01-11 RX ADMIN — LEVETIRACETAM 500 MG: 500 TABLET, FILM COATED ORAL at 08:01

## 2025-01-11 RX ADMIN — APIXABAN 5 MG: 5 TABLET, FILM COATED ORAL at 09:01

## 2025-01-11 RX ADMIN — LEVETIRACETAM 500 MG: 500 TABLET, FILM COATED ORAL at 09:01

## 2025-01-11 RX ADMIN — PREGABALIN 75 MG: 75 CAPSULE ORAL at 08:01

## 2025-01-11 RX ADMIN — LORAZEPAM 0.5 MG: 0.5 TABLET ORAL at 05:01

## 2025-01-11 RX ADMIN — PREGABALIN 75 MG: 75 CAPSULE ORAL at 09:01

## 2025-01-11 RX ADMIN — TACROLIMUS 0.5 MG: 0.5 CAPSULE ORAL at 05:01

## 2025-01-11 RX ADMIN — TACROLIMUS 0.5 MG: 0.5 CAPSULE ORAL at 08:01

## 2025-01-11 RX ADMIN — SACUBITRIL AND VALSARTAN 1 TABLET: 49; 51 TABLET, FILM COATED ORAL at 09:01

## 2025-01-11 NOTE — PROGRESS NOTES
[facility] - Neurosurgery ([facility])  [facility] Medicine  Progress Note    Patient Name: Tej Purdy  MRN: [id number]  Patient Class: IP- Inpatient   Admission Date: 1/8/2025  Length of Stay: 3 days  Attending Physician: Fabio Akins MD  Primary Care Provider: Kole Sharpe MD        Subjective     Principal Problem:Seizure        HPI:  Patient is a 70 yo male with a PMH of CASTILLO cirrhosis, cholangiocarcinoma s/p liver transplant in 2022 on tacrolimus, type 2 diabetes mellitus on insulin, hypertension, coronary artery disease s/p LHC +PCI (12/23), severe aortic stenosis, heart failure reduced ejection fraction with LVEF of 20-25%, persistent atrial fibrillation s/p several cardioversions on apixaban, and peripheral arterial disease who presents via EMS after being found down at home. Patient states he remembers falling and never lost consciousness but is currently confused and an unreliable historian. According to ED report, patient's wife was contacted by phone by ED physician. She heard patient moaning from their bathroom and found him confused, covered in feces and vomiting. Wife witnessed seizure like movement while waiting for EMS. She states he was in his normal state of health the previous night and takes his prescribed medications regularly. EMS transported him to [facility] and while in the ambulance, patient had a witnessed seizure like event with tonic-clonic movements that lasted less than a minute and terminated without medication. No known history of seizures. Takes Wellbutrin and Prograf which are seizure threshold lowering medications. Patient currently alert but confused and declines to answer most questions although does state he is thirsty and cold.     While in the ED: Patient afebrile and HDS. Placed on 2L NC for oxygen saturation in the 90s. No focal neurologic deficits noted but patient with diffuse weakness. Confused and disoriented. WBC 22.1. Bicarb 15. Glucose 261. . UA  not suspicious for UTI. CXR without obvious consolidations or opacities. CT head negative for acute stroke or bleed. CT a/p with operative change of liver transplant with continued prominence of the intrahepatic biliary ducts, similar to priors and healing fractures of the 9th through 11th right ribs. Patient was started on broad spectrum antibiotics and loaded with keppra. Admitted to hospital medicine for suspected new onset seizures.    Overview/Hospital Course:  Admitted with likely seizure that was witnessed by his wife. Patient started on Keppra and Neurology consult appreciated - Concern for seizure related to medication, PRES, illness. Ideally would obtain MRI brain, but the patient initially refused due to claustrophobia and a past unpleasant experience. EEG showed left temporal focal slowing. Neurology again recommended an MRI, and the patient expressed a desire to undergo it under general anesthesia. He was stable during the early morning rounds and was pushing for discharge. However, around 9 AM, we were notified that the patient is c/o chest pain, dizziness, and was tachycardic. ECG showed an irregular HR with no evidence of MI, and troponin levels were negative. He was scheduled for an MRI today. He requests general anesthesia, but the anesthesia team recommended light sedation due to his severe aortic stenosis and low ejection fraction. However, the patient refused.    Interval History: No active events overnight , his A-fib has improved. BP- 107/63, P-52, labs were unremarkable. Reduced Carvedilol dose back to the patient's regular dose, hold Entresto if SBP is let than 90.    Review of Systems  Objective:     Vital Signs (Most Recent):  Temp: 97.7 °F (36.5 °C) (01/11/25 0728)  Pulse: (!) 59 (01/11/25 1136)  Resp: 16 (01/11/25 0728)  BP: (!) 148/68 (01/11/25 1136)  SpO2: 96 % (01/11/25 0728) Vital Signs (24h Range):  Temp:  [97.7 °F (36.5 °C)-98.4 °F (36.9 °C)] 97.7 °F (36.5 °C)  Pulse:  []  59  Resp:  [16-20] 16  SpO2:  [93 %-96 %] 96 %  BP: ()/(52-68) 148/68     Weight: 94.7 kg (208 lb 12.4 oz)  Body mass index is 28.32 kg/m².    Intake/Output Summary (Last 24 hours) at 1/11/2025 1137  Last data filed at 1/10/2025 2124  Gross per 24 hour   Intake 250 ml   Output 150 ml   Net 100 ml         Physical Exam  Constitutional:       General: He is not in acute distress.  HENT:      Mouth/Throat:      Mouth: Mucous membranes are moist.   Eyes:      General: No scleral icterus.     Pupils: Pupils are equal, round, and reactive to light.   Cardiovascular:      Rate and Rhythm: Normal rate and regular rhythm.      Pulses: Normal pulses.   Pulmonary:      Effort: Pulmonary effort is normal. No respiratory distress.      Breath sounds: Normal breath sounds. No wheezing, rhonchi or rales.   Abdominal:      General: Abdomen is flat. There is no distension.      Palpations: Abdomen is soft.      Tenderness: There is no abdominal tenderness.   Musculoskeletal:      Right lower leg: No edema.      Left lower leg: No edema.   Skin:     General: Skin is warm and dry.   Neurological:      Mental Status: He is alert and oriented to person, place, and time.   Psychiatric:         Mood and Affect: Mood normal.         Behavior: Behavior normal.             Significant Labs: All pertinent labs within the past 24 hours have been reviewed.    Significant Imaging: I have reviewed all pertinent imaging results/findings within the past 24 hours.    Assessment and Plan     * Seizure  Patient with history of liver transplant on tacrolimus who presents after being found down and then 2 witnessed episodes of seizure like activity. Takes Welbutrin for history of depression. No focal neurologic deficits but patient still acutely confused. Afebrile but WBC 22.1. UA negative. COVID/Flu negative. CT head and CT a/p unremarkable for acute pathology.     Seizures likely in chronically ill patient on medications that can lower seizure  threshold (namely Wellbutrin and tacrolimus) and possible infection with increased WBC. Cardiac syncope or orthostatic hypotension unlikely given his likely post ictal confusion but possible.     Plan:  -Loaded with keppra, continue at 500mg BID  -F/u EEG  -Neurology consulted, recs appreciated  -Wellbutrin discontinued  -F/u tacrolimus level  -Pending neurology evaluation, patient may need MRI brain or possibly LP  -F/u infectious workup  -Cardiac monitoring  -Orthostatic vitals      History of liver transplant        Chronic systolic heart failure  Transthoracic echo (TTE) complete 10/14/2024    Interpretation Summary    Left Ventricle: The left ventricle is normal in size. Normal wall thickness. There is normal systolic function with a visually estimated ejection fraction of 60 - 65%.    Right Ventricle: Normal right ventricular cavity size. Wall thickness is normal. Systolic function is normal.    Left Atrium: Left atrium is mildly dilated.    Aortic Valve: There is severe aortic valve sclerosis. Severely restricted motion. There is severe stenosis. Aortic valve area by VTI is 0.7 cm2. Aortic valve peak velocity is 4.6 m/s. Mean gradient is 47.3 mmHg. The dimensionless index is 0.20. There is mild aortic regurgitation.    Mitral Valve: There is mild regurgitation.    Pulmonary Artery: No pulmonary hypertension. The estimated pulmonary artery systolic pressure is 30 mmHg.    IVC/SVC: Normal venous pressure at 3 mmHg.    Previous Echo with EF of 20-25%.    -Continued on GDMT  -Lasix PRN      S/P liver transplant  PMH significant for cholangiocarcinoma (status post liver transplant in 01/2022).  -Continue home tacrolimus  -CMP and tacrolimus level daily  -Consider hepatology consult pending tacrolimus level for management of immunosuppression       Atrial fibrillation with rapid ventricular response  Patient has paroxysmal (<7 days) atrial fibrillation. Patient is currently in sinus rhythm. [id number] Score: 3.  Patient currently in A fib. Recently discontinued on amiodarone and started on metoprolol. [id number] score: 3     -- Continued home eliquis  -- Increased Carvedilol  -- Metoprolol  -- Cardiology        Coronary artery disease involving native coronary artery of native heart without angina pectoris  Patient with known CAD. Plavix previously discontinued.  -Continue on home eliquis and statin    Type 2 diabetes mellitus, with long-term current use of insulin  Lab Results   Component Value Date    HGBA1C 6.6 (H) 01/09/2025     Recent Labs     01/10/25  0736 01/10/25  1210 01/10/25  1512 01/10/25  2119 01/11/25  0730 01/11/25  1129   POCTGLUCOSE 144* 171* 173* 192* 147* 167*       - LDSSI  - cardiac/diabetic diet  - POCT glucose checks TIDWM and qhs  - goal glucose 140-180      Leukocytosis  WBC 22.1. Afebrile. History with symptoms possibly concerning for infection including weakness, vomiting after seizure episode and maybe chills but difficulty to distinguish in setting of possible new seizures. UA negative. COVID/Flu negative. CT head and CT a/p unremarkable for acute pathology. CXR and CT a/p unrevealing.    Concern for acute infection in patient with leukocytosis and new onset seizure activity. Unclear source but of note, patient has had a previous admission for leukocytosis related to suspected infection in abdomen before liver transplant. Could just be from seizures.     Plan:  -Continue on broad spectrum vanc and zosyn, tailor with culture data  -F/u blood cultures   -Continuing GDMT in patient with significant heart failure but without signs of hypotension  -Continue to monitor for signs and symptoms of infection    Primary hypertension  Patient's blood pressure range in the last 24 hours was: BP  Min: 83/55  Max: 148/68.The patient's inpatient anti-hypertensive regimen is listed below:  Current Antihypertensives  carvediloL tablet 12.5 mg, 2 times daily with meals, Oral    - Continued on home GDMT  -Hold for  signs of sepsis or hypotension      VTE Risk Mitigation (From admission, onward)           Ordered     apixaban tablet 5 mg  2 times daily         01/08/25 2034     IP VTE HIGH RISK PATIENT  Once         01/08/25 2034     Place sequential compression device  Until discontinued         01/08/25 2034                    Discharge Planning   FELISHA: 1/13/2025     Code Status: Full Code   Medical Readiness for Discharge Date:                            Raul Pascual MD  Department of Hospital Medicine   [facility] - Neurosurgery ([facility])

## 2025-01-11 NOTE — ASSESSMENT & PLAN NOTE
Lab Results   Component Value Date    HGBA1C 6.6 (H) 01/09/2025     Recent Labs     01/10/25  0736 01/10/25  1210 01/10/25  1512 01/10/25  2119 01/11/25  0730 01/11/25  1129   POCTGLUCOSE 144* 171* 173* 192* 147* 167*       - LDSSI  - cardiac/diabetic diet  - POCT glucose checks TIDWM and qhs  - goal glucose 140-180

## 2025-01-11 NOTE — PLAN OF CARE
Problem: Skin Injury Risk Increased  Goal: Skin Health and Integrity  Outcome: Progressing     Problem: Adult Inpatient Plan of Care  Goal: Plan of Care Review  Outcome: Progressing  Goal: Patient-Specific Goal (Individualized)  Outcome: Progressing  Goal: Absence of Hospital-Acquired Illness or Injury  Outcome: Progressing  Goal: Optimal Comfort and Wellbeing  Outcome: Progressing  Goal: Readiness for Transition of Care  Outcome: Progressing     Problem: Diabetes Comorbidity  Goal: Blood Glucose Level Within Targeted Range  Outcome: Progressing     Problem: Fall Injury Risk  Goal: Absence of Fall and Fall-Related Injury  Outcome: Progressing     Problem: Wound  Goal: Optimal Coping  Outcome: Progressing  Goal: Optimal Functional Ability  Outcome: Progressing  Goal: Absence of Infection Signs and Symptoms  Outcome: Progressing  Goal: Improved Oral Intake  Outcome: Progressing  Goal: Optimal Pain Control and Function  Outcome: Progressing  Goal: Skin Health and Integrity  Outcome: Progressing  Goal: Optimal Wound Healing  Outcome: Progressing

## 2025-01-11 NOTE — ASSESSMENT & PLAN NOTE
Patient's blood pressure range in the last 24 hours was: BP  Min: 83/55  Max: 148/68.The patient's inpatient anti-hypertensive regimen is listed below:  Current Antihypertensives  carvediloL tablet 12.5 mg, 2 times daily with meals, Oral    - Continued on home GDMT  -Hold for signs of sepsis or hypotension

## 2025-01-11 NOTE — SUBJECTIVE & OBJECTIVE
Interval History: No active events overnight , his A-fib has improved. BP- 107/63, P-52, labs were unremarkable. Reduced Carvedilol dose back to the patient's regular dose, hold Entresto if SBP is let than 90.    Review of Systems  Objective:     Vital Signs (Most Recent):  Temp: 97.7 °F (36.5 °C) (01/11/25 0728)  Pulse: (!) 59 (01/11/25 1136)  Resp: 16 (01/11/25 0728)  BP: (!) 148/68 (01/11/25 1136)  SpO2: 96 % (01/11/25 0728) Vital Signs (24h Range):  Temp:  [97.7 °F (36.5 °C)-98.4 °F (36.9 °C)] 97.7 °F (36.5 °C)  Pulse:  [] 59  Resp:  [16-20] 16  SpO2:  [93 %-96 %] 96 %  BP: ()/(52-68) 148/68     Weight: 94.7 kg (208 lb 12.4 oz)  Body mass index is 28.32 kg/m².    Intake/Output Summary (Last 24 hours) at 1/11/2025 1137  Last data filed at 1/10/2025 2124  Gross per 24 hour   Intake 250 ml   Output 150 ml   Net 100 ml         Physical Exam  Constitutional:       General: He is not in acute distress.  HENT:      Mouth/Throat:      Mouth: Mucous membranes are moist.   Eyes:      General: No scleral icterus.     Pupils: Pupils are equal, round, and reactive to light.   Cardiovascular:      Rate and Rhythm: Normal rate and regular rhythm.      Pulses: Normal pulses.   Pulmonary:      Effort: Pulmonary effort is normal. No respiratory distress.      Breath sounds: Normal breath sounds. No wheezing, rhonchi or rales.   Abdominal:      General: Abdomen is flat. There is no distension.      Palpations: Abdomen is soft.      Tenderness: There is no abdominal tenderness.   Musculoskeletal:      Right lower leg: No edema.      Left lower leg: No edema.   Skin:     General: Skin is warm and dry.   Neurological:      Mental Status: He is alert and oriented to person, place, and time.   Psychiatric:         Mood and Affect: Mood normal.         Behavior: Behavior normal.             Significant Labs: All pertinent labs within the past 24 hours have been reviewed.    Significant Imaging: I have reviewed all pertinent  imaging results/findings within the past 24 hours.

## 2025-01-11 NOTE — DISCHARGE INSTRUCTIONS
Wellbutrin discontinued due to seizure risk. Continue Keppra 500 twice per day for seizure prevention.

## 2025-01-11 NOTE — PROGRESS NOTES
01/10/25 2009 01/10/25 2119 01/10/25 2123   Vital Signs   Pulse 63  --  64   Heart Rate Source Monitor  --  Monitor   Resp 18 18  --    SpO2 95 %  --   --    BP (!) 94/54  --  109/61   MAP (mmHg) 67  --  77   BP Location Right arm  --  Right arm   BP Method Automatic  --  Automatic   Patient Position Lying  --  Lying   Orthostatic VS No  --  No     Patient has scheduled Sacubitril/Valsartan 49mg-51mg due at 2100. BP is soft with initial VS at 2000.  Rechecked patient's BP prior to medication administration and BP remained soft. See above for VS details. Notified Hospital Medicine 1 provider (RAIMUNDO Manzo MD) on call of soft BP. Provider stated  to hold scheduled Sacubitril/Valsartan 49mg-51mg. No acute signs of distress noted at this time.

## 2025-01-12 LAB
ALBUMIN SERPL BCP-MCNC: 3.1 G/DL (ref 3.5–5.2)
ALP SERPL-CCNC: 74 U/L (ref 40–150)
ALT SERPL W/O P-5'-P-CCNC: 14 U/L (ref 10–44)
ANION GAP SERPL CALC-SCNC: 7 MMOL/L (ref 8–16)
APTT PPP: 31.5 SEC (ref 21–32)
AST SERPL-CCNC: 20 U/L (ref 10–40)
BASOPHILS # BLD AUTO: 0.05 K/UL (ref 0–0.2)
BASOPHILS NFR BLD: 0.8 % (ref 0–1.9)
BILIRUB SERPL-MCNC: 0.6 MG/DL (ref 0.1–1)
BUN SERPL-MCNC: 41 MG/DL (ref 8–23)
CALCIUM SERPL-MCNC: 9.2 MG/DL (ref 8.7–10.5)
CHLORIDE SERPL-SCNC: 107 MMOL/L (ref 95–110)
CO2 SERPL-SCNC: 24 MMOL/L (ref 23–29)
CREAT SERPL-MCNC: 1.3 MG/DL (ref 0.5–1.4)
DIFFERENTIAL METHOD BLD: ABNORMAL
EOSINOPHIL # BLD AUTO: 0.2 K/UL (ref 0–0.5)
EOSINOPHIL NFR BLD: 3.2 % (ref 0–8)
ERYTHROCYTE [DISTWIDTH] IN BLOOD BY AUTOMATED COUNT: 15.4 % (ref 11.5–14.5)
EST. GFR  (NO RACE VARIABLE): 58.7 ML/MIN/1.73 M^2
GLUCOSE SERPL-MCNC: 148 MG/DL (ref 70–110)
HCT VFR BLD AUTO: 36.7 % (ref 40–54)
HGB BLD-MCNC: 11.3 G/DL (ref 14–18)
IMM GRANULOCYTES # BLD AUTO: 0.01 K/UL (ref 0–0.04)
IMM GRANULOCYTES NFR BLD AUTO: 0.2 % (ref 0–0.5)
INR PPP: 1.3 (ref 0.8–1.2)
LYMPHOCYTES # BLD AUTO: 1 K/UL (ref 1–4.8)
LYMPHOCYTES NFR BLD: 16.9 % (ref 18–48)
MCH RBC QN AUTO: 26.7 PG (ref 27–31)
MCHC RBC AUTO-ENTMCNC: 30.8 G/DL (ref 32–36)
MCV RBC AUTO: 87 FL (ref 82–98)
MONOCYTES # BLD AUTO: 0.8 K/UL (ref 0.3–1)
MONOCYTES NFR BLD: 13 % (ref 4–15)
NEUTROPHILS # BLD AUTO: 3.9 K/UL (ref 1.8–7.7)
NEUTROPHILS NFR BLD: 65.9 % (ref 38–73)
NRBC BLD-RTO: 0 /100 WBC
PLATELET # BLD AUTO: 87 K/UL (ref 150–450)
PMV BLD AUTO: 12.1 FL (ref 9.2–12.9)
POCT GLUCOSE: 154 MG/DL (ref 70–110)
POCT GLUCOSE: 160 MG/DL (ref 70–110)
POCT GLUCOSE: 180 MG/DL (ref 70–110)
POCT GLUCOSE: 181 MG/DL (ref 70–110)
POTASSIUM SERPL-SCNC: 4.2 MMOL/L (ref 3.5–5.1)
PROT SERPL-MCNC: 6.1 G/DL (ref 6–8.4)
PROTHROMBIN TIME: 14 SEC (ref 9–12.5)
RBC # BLD AUTO: 4.24 M/UL (ref 4.6–6.2)
SODIUM SERPL-SCNC: 138 MMOL/L (ref 136–145)
TACROLIMUS BLD-MCNC: 4.9 NG/ML (ref 5–15)
WBC # BLD AUTO: 5.93 K/UL (ref 3.9–12.7)

## 2025-01-12 PROCEDURE — 63600175 PHARM REV CODE 636 W HCPCS: Performed by: STUDENT IN AN ORGANIZED HEALTH CARE EDUCATION/TRAINING PROGRAM

## 2025-01-12 PROCEDURE — 25000003 PHARM REV CODE 250: Performed by: EMERGENCY MEDICINE

## 2025-01-12 PROCEDURE — 85610 PROTHROMBIN TIME: CPT

## 2025-01-12 PROCEDURE — 63600175 PHARM REV CODE 636 W HCPCS

## 2025-01-12 PROCEDURE — 25000003 PHARM REV CODE 250

## 2025-01-12 PROCEDURE — 25000003 PHARM REV CODE 250: Performed by: STUDENT IN AN ORGANIZED HEALTH CARE EDUCATION/TRAINING PROGRAM

## 2025-01-12 PROCEDURE — 80053 COMPREHEN METABOLIC PANEL: CPT

## 2025-01-12 PROCEDURE — 85730 THROMBOPLASTIN TIME PARTIAL: CPT

## 2025-01-12 PROCEDURE — 11000001 HC ACUTE MED/SURG PRIVATE ROOM

## 2025-01-12 PROCEDURE — 85025 COMPLETE CBC W/AUTO DIFF WBC: CPT

## 2025-01-12 PROCEDURE — 25000003 PHARM REV CODE 250: Performed by: NURSE PRACTITIONER

## 2025-01-12 PROCEDURE — 80197 ASSAY OF TACROLIMUS: CPT

## 2025-01-12 RX ADMIN — PREGABALIN 75 MG: 75 CAPSULE ORAL at 08:01

## 2025-01-12 RX ADMIN — LEVETIRACETAM 500 MG: 500 TABLET, FILM COATED ORAL at 08:01

## 2025-01-12 RX ADMIN — CARVEDILOL 12.5 MG: 12.5 TABLET, FILM COATED ORAL at 08:01

## 2025-01-12 RX ADMIN — LORAZEPAM 0.5 MG: 0.5 TABLET ORAL at 08:01

## 2025-01-12 RX ADMIN — Medication 6 MG: at 02:01

## 2025-01-12 RX ADMIN — APIXABAN 5 MG: 5 TABLET, FILM COATED ORAL at 08:01

## 2025-01-12 RX ADMIN — TACROLIMUS 0.5 MG: 0.5 CAPSULE ORAL at 06:01

## 2025-01-12 RX ADMIN — CARVEDILOL 12.5 MG: 12.5 TABLET, FILM COATED ORAL at 06:01

## 2025-01-12 RX ADMIN — MUPIROCIN: 20 OINTMENT TOPICAL at 08:01

## 2025-01-12 RX ADMIN — HYDROCODONE BITARTRATE AND ACETAMINOPHEN 1 TABLET: 7.5; 325 TABLET ORAL at 08:01

## 2025-01-12 RX ADMIN — KETOROLAC TROMETHAMINE 15 MG: 30 INJECTION, SOLUTION INTRAMUSCULAR; INTRAVENOUS at 05:01

## 2025-01-12 RX ADMIN — ATORVASTATIN CALCIUM 80 MG: 40 TABLET, FILM COATED ORAL at 08:01

## 2025-01-12 RX ADMIN — SACUBITRIL AND VALSARTAN 1 TABLET: 49; 51 TABLET, FILM COATED ORAL at 08:01

## 2025-01-12 RX ADMIN — TACROLIMUS 0.5 MG: 0.5 CAPSULE ORAL at 08:01

## 2025-01-12 RX ADMIN — EMPAGLIFLOZIN 10 MG: 10 TABLET, FILM COATED ORAL at 08:01

## 2025-01-12 NOTE — ASSESSMENT & PLAN NOTE
Patient with history of liver transplant on tacrolimus who presents after being found down and then 2 witnessed episodes of seizure like activity. Takes Welbutrin for history of depression. No focal neurologic deficits but patient still acutely confused. Afebrile but WBC 22.1. UA negative. COVID/Flu negative. CT head and CT a/p unremarkable for acute pathology. Seizures likely in chronically ill patient on medications that can lower seizure threshold (namely Wellbutrin and tacrolimus) and possible infection with increased WBC. Underwent EEG which showed focal left temporal slowing. Neurology noted possible left temporoparietal hypodensity on CT head. Recommended brain MRI that we has agreed to do under light sedation.    Plan:  -Loaded with keppra, continue at 500mg BID  -Awaiting brain MRI   -F/u Neurology recs  -Wellbutrin discontinued  -F/u tacrolimus level  -Cardiac monitoring  -Orthostatic vitals

## 2025-01-12 NOTE — ASSESSMENT & PLAN NOTE
Transthoracic echo (TTE) complete 10/14/2024    Interpretation Summary    Left Ventricle: The left ventricle is normal in size. Normal wall thickness. There is normal systolic function with a visually estimated ejection fraction of 60 - 65%.    Right Ventricle: Normal right ventricular cavity size. Wall thickness is normal. Systolic function is normal.    Left Atrium: Left atrium is mildly dilated.    Aortic Valve: There is severe aortic valve sclerosis. Severely restricted motion. There is severe stenosis. Aortic valve area by VTI is 0.7 cm2. Aortic valve peak velocity is 4.6 m/s. Mean gradient is 47.3 mmHg. The dimensionless index is 0.20. There is mild aortic regurgitation.    Mitral Valve: There is mild regurgitation.    Pulmonary Artery: No pulmonary hypertension. The estimated pulmonary artery systolic pressure is 30 mmHg.    IVC/SVC: Normal venous pressure at 3 mmHg.    Previous Echo with EF of 20-25%.    -Continued on GDMT  -Lasix PRN     mild impairment/patient holding pen to write to family and OT

## 2025-01-12 NOTE — PLAN OF CARE
Problem: Adult Inpatient Plan of Care  Goal: Patient-Specific Goal (Individualized)  Description: Pt will maintain sbp below 180  Outcome: Progressing  Flowsheets (Taken 1/12/2025 0235)  Individualized Care Needs: care clustered  Anxieties, Fears or Concerns: concerns regarding upcoming testing  Goal: Optimal Comfort and Wellbeing  Outcome: Progressing  Intervention: Monitor Pain and Promote Comfort  Flowsheets (Taken 1/12/2025 0235)  Pain Management Interventions: care clustered  Intervention: Provide Person-Centered Care  Flowsheets (Taken 1/12/2025 0235)  Trust Relationship/Rapport:   care explained   choices provided     Ketorolac given x 1 for pain. Seizure precautions maintained. VSS. Bed in lowest position, side rails x 3, and call light in reach.

## 2025-01-12 NOTE — ASSESSMENT & PLAN NOTE
Patient's blood pressure range in the last 24 hours was: BP  Min: 125/60  Max: 180/79.The patient's inpatient anti-hypertensive regimen is listed below:  Current Antihypertensives  carvediloL tablet 12.5 mg, 2 times daily with meals, Oral    - Continued on home GDMT  -Hold for signs of sepsis or hypotension

## 2025-01-12 NOTE — NURSING
"Patient has been educated multiple times during this shift of fall risk/fall prevention and need to call staff when needing to use restroom. He has been noncompliant with this education and yelling at staff when going in room. He has been open to attending nurse when in room and expressed the need to use call bell when needing to go to the restroom. He states "I just don't want the alarm to go off when I get up." Nurse advised that if bed alarm is not on, policy is to have a camera in room that will go off when he stands up and that only way to avoid alarms going off is to use call bell. Per patient @1415 "I will use the call bell then". Patient is currently in restroom and will assist back to bed once he is finished and activate bed alarm.  "

## 2025-01-12 NOTE — PROGRESS NOTES
[facility] - Neurosurgery ([facility])  [facility] Medicine  Progress Note    Patient Name: Tej Purdy  MRN: [id number]  Patient Class: IP- Inpatient   Admission Date: 1/8/2025  Length of Stay: 4 days  Attending Physician: Fabio Akins MD  Primary Care Provider: Kole Sharpe MD        Subjective     Principal Problem:Seizure        HPI:  Patient is a 68 yo male with a PMH of CASTILLO cirrhosis, cholangiocarcinoma s/p liver transplant in 2022 on tacrolimus, type 2 diabetes mellitus on insulin, hypertension, coronary artery disease s/p LHC +PCI (12/23), severe aortic stenosis, heart failure reduced ejection fraction with LVEF of 20-25%, persistent atrial fibrillation s/p several cardioversions on apixaban, and peripheral arterial disease who presents via EMS after being found down at home. Patient states he remembers falling and never lost consciousness but is currently confused and an unreliable historian. According to ED report, patient's wife was contacted by phone by ED physician. She heard patient moaning from their bathroom and found him confused, covered in feces and vomiting. Wife witnessed seizure like movement while waiting for EMS. She states he was in his normal state of health the previous night and takes his prescribed medications regularly. EMS transported him to [facility] and while in the ambulance, patient had a witnessed seizure like event with tonic-clonic movements that lasted less than a minute and terminated without medication. No known history of seizures. Takes Wellbutrin and Prograf which are seizure threshold lowering medications. Patient currently alert but confused and declines to answer most questions although does state he is thirsty and cold.     While in the ED: Patient afebrile and HDS. Placed on 2L NC for oxygen saturation in the 90s. No focal neurologic deficits noted but patient with diffuse weakness. Confused and disoriented. WBC 22.1. Bicarb 15. Glucose 261. . UA  not suspicious for UTI. CXR without obvious consolidations or opacities. CT head negative for acute stroke or bleed. CT a/p with operative change of liver transplant with continued prominence of the intrahepatic biliary ducts, similar to priors and healing fractures of the 9th through 11th right ribs. Patient was started on broad spectrum antibiotics and loaded with keppra. Admitted to hospital medicine for suspected new onset seizures.    Overview/Hospital Course:  Admitted with likely seizure that was witnessed by his wife. Patient started on Keppra and Neurology consult appreciated - Concern for seizure related to medication, PRES, illness. Ideally would obtain MRI brain, but the patient initially refused due to claustrophobia and a past unpleasant experience. EEG showed left temporal focal slowing. Neurology again recommended an MRI, and the patient expressed a desire to undergo it under general anesthesia. He was stable during the early morning rounds and was pushing for discharge. However, around 9 AM, we were notified that the patient is c/o chest pain, dizziness, and was tachycardic. ECG showed an irregular HR with no evidence of MI, and troponin levels were negative. He was scheduled for an MRI today. He requests general anesthesia, but the anesthesia team recommended light sedation due to his severe aortic stenosis and low ejection fraction. However, the patient refused. He has now agreed to do the MRI under light sedation but he wants his wife to be present.    Interval History: No active events overnight. Vitals were within the acceptable range, labs were unremarkable. Awaiting MRI and f/u Neurology recs.     Review of Systems  Objective:     Vital Signs (Most Recent):  Temp: 98.2 °F (36.8 °C) (01/12/25 1149)  Pulse: (!) 57 (01/12/25 1149)  Resp: 18 (01/12/25 0739)  BP: (!) 172/80 (01/12/25 1149)  SpO2: (!) 93 % (01/12/25 1149) Vital Signs (24h Range):  Temp:  [97.9 °F (36.6 °C)-98.4 °F (36.9 °C)] 98.2  °F (36.8 °C)  Pulse:  [56-63] 57  Resp:  [17-20] 18  SpO2:  [93 %-96 %] 93 %  BP: (125-180)/(60-88) 172/80     Weight: 94.7 kg (208 lb 12.4 oz)  Body mass index is 28.32 kg/m².    Intake/Output Summary (Last 24 hours) at 1/12/2025 1244  Last data filed at 1/12/2025 0500  Gross per 24 hour   Intake 130 ml   Output 450 ml   Net -320 ml         Physical Exam  Constitutional:       General: He is not in acute distress.  HENT:      Mouth/Throat:      Mouth: Mucous membranes are moist.   Eyes:      General: No scleral icterus.     Pupils: Pupils are equal, round, and reactive to light.   Cardiovascular:      Rate and Rhythm: Normal rate and regular rhythm.      Pulses: Normal pulses.   Pulmonary:      Effort: Pulmonary effort is normal. No respiratory distress.      Breath sounds: Normal breath sounds. No wheezing, rhonchi or rales.   Abdominal:      General: Abdomen is flat. There is no distension.      Palpations: Abdomen is soft.      Tenderness: There is no abdominal tenderness.   Musculoskeletal:      Right lower leg: No edema.      Left lower leg: No edema.   Skin:     General: Skin is warm and dry.   Neurological:      Mental Status: He is alert and oriented to person, place, and time.   Psychiatric:         Mood and Affect: Mood normal.         Behavior: Behavior normal.             Significant Labs: All pertinent labs within the past 24 hours have been reviewed.    Significant Imaging: I have reviewed all pertinent imaging results/findings within the past 24 hours.    Assessment and Plan     * Seizure  Patient with history of liver transplant on tacrolimus who presents after being found down and then 2 witnessed episodes of seizure like activity. Takes Welbutrin for history of depression. No focal neurologic deficits but patient still acutely confused. Afebrile but WBC 22.1. UA negative. COVID/Flu negative. CT head and CT a/p unremarkable for acute pathology. Seizures likely in chronically ill patient on  medications that can lower seizure threshold (namely Wellbutrin and tacrolimus) and possible infection with increased WBC. Underwent EEG which showed focal left temporal slowing. Neurology noted possible left temporoparietal hypodensity on CT head. Recommended brain MRI that we has agreed to do under light sedation.    Plan:  -Loaded with keppra, continue at 500mg BID  -Awaiting brain MRI   -F/u Neurology recs  -Wellbutrin discontinued  -F/u tacrolimus level  -Cardiac monitoring  -Orthostatic vitals      Hypercoagulable state due to atrial fibrillation        History of liver transplant        Chronic systolic heart failure  Transthoracic echo (TTE) complete 10/14/2024    Interpretation Summary    Left Ventricle: The left ventricle is normal in size. Normal wall thickness. There is normal systolic function with a visually estimated ejection fraction of 60 - 65%.    Right Ventricle: Normal right ventricular cavity size. Wall thickness is normal. Systolic function is normal.    Left Atrium: Left atrium is mildly dilated.    Aortic Valve: There is severe aortic valve sclerosis. Severely restricted motion. There is severe stenosis. Aortic valve area by VTI is 0.7 cm2. Aortic valve peak velocity is 4.6 m/s. Mean gradient is 47.3 mmHg. The dimensionless index is 0.20. There is mild aortic regurgitation.    Mitral Valve: There is mild regurgitation.    Pulmonary Artery: No pulmonary hypertension. The estimated pulmonary artery systolic pressure is 30 mmHg.    IVC/SVC: Normal venous pressure at 3 mmHg.    Previous Echo with EF of 20-25%.    -Continued on GDMT  -Lasix PRN      S/P liver transplant  PMH significant for cholangiocarcinoma (status post liver transplant in 01/2022).  -Continue home tacrolimus  -CMP and tacrolimus level daily  -Consider hepatology consult pending tacrolimus level for management of immunosuppression       Chronic anticoagulation        Atrial fibrillation with rapid ventricular response  Patient  has paroxysmal (<7 days) atrial fibrillation. Patient is currently in sinus rhythm. [id number] Score: 3. Patient currently in A fib. Recently discontinued on amiodarone and started on metoprolol. [id number] score: 3     -- Continued home eliquis  -- Increased Carvedilol  -- Metoprolol  -- Cardiology        Coronary artery disease involving native coronary artery of native heart without angina pectoris  Patient with known CAD. Plavix previously discontinued.  -Continue on home eliquis and statin    Type 2 diabetes mellitus, with long-term current use of insulin  Lab Results   Component Value Date    HGBA1C 6.6 (H) 01/09/2025     Recent Labs     01/11/25  0730 01/11/25  1129 01/11/25  1637 01/11/25  2140 01/12/25  0741 01/12/25  1150   POCTGLUCOSE 147* 167* 178* 164* 160* 181*         - LDSSI  - cardiac/diabetic diet  - POCT glucose checks TIDWM and qhs  - goal glucose 140-180      Leukocytosis  WBC 22.1. Afebrile. History with symptoms possibly concerning for infection including weakness, vomiting after seizure episode and maybe chills but difficulty to distinguish in setting of possible new seizures. UA negative. COVID/Flu negative. CT head and CT a/p unremarkable for acute pathology. CXR and CT a/p unrevealing.    Concern for acute infection in patient with leukocytosis and new onset seizure activity. Unclear source but of note, patient has had a previous admission for leukocytosis related to suspected infection in abdomen before liver transplant. Could just be from seizures.     Plan:  -Continue on broad spectrum vanc and zosyn, tailor with culture data  -F/u blood cultures   -Continuing GDMT in patient with significant heart failure but without signs of hypotension  -Continue to monitor for signs and symptoms of infection    Primary hypertension  Patient's blood pressure range in the last 24 hours was: BP  Min: 125/60  Max: 180/79.The patient's inpatient anti-hypertensive regimen is listed below:  Current  Antihypertensives  carvediloL tablet 12.5 mg, 2 times daily with meals, Oral    - Continued on home GDMT  -Hold for signs of sepsis or hypotension      VTE Risk Mitigation (From admission, onward)           Ordered     apixaban tablet 5 mg  2 times daily         01/08/25 2034     IP VTE HIGH RISK PATIENT  Once         01/08/25 2034     Place sequential compression device  Until discontinued         01/08/25 2034                    Discharge Planning   FELISHA: 1/14/2025     Code Status: Full Code   Medical Readiness for Discharge Date:                            Raul Pascual MD  Department of Hospital Medicine   [facility] - Neurosurgery ([facility])

## 2025-01-12 NOTE — NURSING
@2050 Pt refused bed alarm. Educated pt on importance of bed alarm. Told to call if any assistance needed.

## 2025-01-12 NOTE — SUBJECTIVE & OBJECTIVE
Interval History: No active events overnight. Vitals were within the acceptable range, labs were unremarkable. Awaiting MRI and f/u Neurology recs.     Review of Systems  Objective:     Vital Signs (Most Recent):  Temp: 98.2 °F (36.8 °C) (01/12/25 1149)  Pulse: (!) 57 (01/12/25 1149)  Resp: 18 (01/12/25 0739)  BP: (!) 172/80 (01/12/25 1149)  SpO2: (!) 93 % (01/12/25 1149) Vital Signs (24h Range):  Temp:  [97.9 °F (36.6 °C)-98.4 °F (36.9 °C)] 98.2 °F (36.8 °C)  Pulse:  [56-63] 57  Resp:  [17-20] 18  SpO2:  [93 %-96 %] 93 %  BP: (125-180)/(60-88) 172/80     Weight: 94.7 kg (208 lb 12.4 oz)  Body mass index is 28.32 kg/m².    Intake/Output Summary (Last 24 hours) at 1/12/2025 1244  Last data filed at 1/12/2025 0500  Gross per 24 hour   Intake 130 ml   Output 450 ml   Net -320 ml         Physical Exam  Constitutional:       General: He is not in acute distress.  HENT:      Mouth/Throat:      Mouth: Mucous membranes are moist.   Eyes:      General: No scleral icterus.     Pupils: Pupils are equal, round, and reactive to light.   Cardiovascular:      Rate and Rhythm: Normal rate and regular rhythm.      Pulses: Normal pulses.   Pulmonary:      Effort: Pulmonary effort is normal. No respiratory distress.      Breath sounds: Normal breath sounds. No wheezing, rhonchi or rales.   Abdominal:      General: Abdomen is flat. There is no distension.      Palpations: Abdomen is soft.      Tenderness: There is no abdominal tenderness.   Musculoskeletal:      Right lower leg: No edema.      Left lower leg: No edema.   Skin:     General: Skin is warm and dry.   Neurological:      Mental Status: He is alert and oriented to person, place, and time.   Psychiatric:         Mood and Affect: Mood normal.         Behavior: Behavior normal.             Significant Labs: All pertinent labs within the past 24 hours have been reviewed.    Significant Imaging: I have reviewed all pertinent imaging results/findings within the past 24 hours.

## 2025-01-12 NOTE — ASSESSMENT & PLAN NOTE
Lab Results   Component Value Date    HGBA1C 6.6 (H) 01/09/2025     Recent Labs     01/11/25  0730 01/11/25  1129 01/11/25  1637 01/11/25  2140 01/12/25  0741 01/12/25  1150   POCTGLUCOSE 147* 167* 178* 164* 160* 181*         - LDSSI  - cardiac/diabetic diet  - POCT glucose checks TIDWM and qhs  - goal glucose 140-180

## 2025-01-12 NOTE — ASSESSMENT & PLAN NOTE
Patient has paroxysmal (<7 days) atrial fibrillation. Patient is currently in sinus rhythm. [id number] Score: 3. Patient currently in A fib. Recently discontinued on amiodarone and started on metoprolol. [id number] score: 3     -- Continued home eliquis  -- Increased Carvedilol  -- Metoprolol  -- Cardiology

## 2025-01-13 ENCOUNTER — ANESTHESIA EVENT (OUTPATIENT)
Dept: ENDOSCOPY | Facility: HOSPITAL | Age: 70
DRG: 101 | End: 2025-01-13
Payer: MEDICARE

## 2025-01-13 ENCOUNTER — ANESTHESIA (OUTPATIENT)
Dept: ENDOSCOPY | Facility: HOSPITAL | Age: 70
DRG: 101 | End: 2025-01-13
Payer: MEDICARE

## 2025-01-13 LAB
ALBUMIN SERPL BCP-MCNC: 3.2 G/DL (ref 3.5–5.2)
ALP SERPL-CCNC: 70 U/L (ref 40–150)
ALT SERPL W/O P-5'-P-CCNC: 14 U/L (ref 10–44)
ANION GAP SERPL CALC-SCNC: 8 MMOL/L (ref 8–16)
APTT PPP: 30.8 SEC (ref 21–32)
AST SERPL-CCNC: 19 U/L (ref 10–40)
BACTERIA BLD CULT: NORMAL
BACTERIA BLD CULT: NORMAL
BASOPHILS # BLD AUTO: 0.04 K/UL (ref 0–0.2)
BASOPHILS NFR BLD: 0.6 % (ref 0–1.9)
BILIRUB SERPL-MCNC: 0.7 MG/DL (ref 0.1–1)
BUN SERPL-MCNC: 40 MG/DL (ref 8–23)
CALCIUM SERPL-MCNC: 8.9 MG/DL (ref 8.7–10.5)
CHLORIDE SERPL-SCNC: 108 MMOL/L (ref 95–110)
CO2 SERPL-SCNC: 22 MMOL/L (ref 23–29)
CREAT SERPL-MCNC: 1.1 MG/DL (ref 0.5–1.4)
DIFFERENTIAL METHOD BLD: ABNORMAL
EOSINOPHIL # BLD AUTO: 0.2 K/UL (ref 0–0.5)
EOSINOPHIL NFR BLD: 3.7 % (ref 0–8)
ERYTHROCYTE [DISTWIDTH] IN BLOOD BY AUTOMATED COUNT: 15.4 % (ref 11.5–14.5)
EST. GFR  (NO RACE VARIABLE): >60 ML/MIN/1.73 M^2
GLUCOSE SERPL-MCNC: 137 MG/DL (ref 70–110)
HCT VFR BLD AUTO: 38.4 % (ref 40–54)
HGB BLD-MCNC: 11.7 G/DL (ref 14–18)
IMM GRANULOCYTES # BLD AUTO: 0.03 K/UL (ref 0–0.04)
IMM GRANULOCYTES NFR BLD AUTO: 0.5 % (ref 0–0.5)
INR PPP: 1.3 (ref 0.8–1.2)
LYMPHOCYTES # BLD AUTO: 1.2 K/UL (ref 1–4.8)
LYMPHOCYTES NFR BLD: 18.5 % (ref 18–48)
MCH RBC QN AUTO: 26 PG (ref 27–31)
MCHC RBC AUTO-ENTMCNC: 30.5 G/DL (ref 32–36)
MCV RBC AUTO: 85 FL (ref 82–98)
MONOCYTES # BLD AUTO: 0.8 K/UL (ref 0.3–1)
MONOCYTES NFR BLD: 12.4 % (ref 4–15)
NEUTROPHILS # BLD AUTO: 4 K/UL (ref 1.8–7.7)
NEUTROPHILS NFR BLD: 64.3 % (ref 38–73)
NRBC BLD-RTO: 0 /100 WBC
PLATELET # BLD AUTO: 102 K/UL (ref 150–450)
PMV BLD AUTO: 12.3 FL (ref 9.2–12.9)
POCT GLUCOSE: 123 MG/DL (ref 70–110)
POCT GLUCOSE: 140 MG/DL (ref 70–110)
POCT GLUCOSE: 145 MG/DL (ref 70–110)
POCT GLUCOSE: 201 MG/DL (ref 70–110)
POTASSIUM SERPL-SCNC: 4.2 MMOL/L (ref 3.5–5.1)
PROT SERPL-MCNC: 6.3 G/DL (ref 6–8.4)
PROTHROMBIN TIME: 13.8 SEC (ref 9–12.5)
RBC # BLD AUTO: 4.5 M/UL (ref 4.6–6.2)
SODIUM SERPL-SCNC: 138 MMOL/L (ref 136–145)
TACROLIMUS BLD-MCNC: 4.4 NG/ML (ref 5–15)
WBC # BLD AUTO: 6.27 K/UL (ref 3.9–12.7)

## 2025-01-13 PROCEDURE — 63600175 PHARM REV CODE 636 W HCPCS

## 2025-01-13 PROCEDURE — 37000009 HC ANESTHESIA EA ADD 15 MINS

## 2025-01-13 PROCEDURE — 85610 PROTHROMBIN TIME: CPT

## 2025-01-13 PROCEDURE — 37000008 HC ANESTHESIA 1ST 15 MINUTES

## 2025-01-13 PROCEDURE — 25000003 PHARM REV CODE 250: Performed by: NURSE PRACTITIONER

## 2025-01-13 PROCEDURE — 85025 COMPLETE CBC W/AUTO DIFF WBC: CPT

## 2025-01-13 PROCEDURE — 25000003 PHARM REV CODE 250

## 2025-01-13 PROCEDURE — 99223 1ST HOSP IP/OBS HIGH 75: CPT | Mod: GC,,, | Performed by: STUDENT IN AN ORGANIZED HEALTH CARE EDUCATION/TRAINING PROGRAM

## 2025-01-13 PROCEDURE — 82962 GLUCOSE BLOOD TEST: CPT

## 2025-01-13 PROCEDURE — A9585 GADOBUTROL INJECTION: HCPCS | Performed by: STUDENT IN AN ORGANIZED HEALTH CARE EDUCATION/TRAINING PROGRAM

## 2025-01-13 PROCEDURE — 11000001 HC ACUTE MED/SURG PRIVATE ROOM

## 2025-01-13 PROCEDURE — 85730 THROMBOPLASTIN TIME PARTIAL: CPT

## 2025-01-13 PROCEDURE — 80197 ASSAY OF TACROLIMUS: CPT

## 2025-01-13 PROCEDURE — 80053 COMPREHEN METABOLIC PANEL: CPT

## 2025-01-13 PROCEDURE — 25000003 PHARM REV CODE 250: Performed by: STUDENT IN AN ORGANIZED HEALTH CARE EDUCATION/TRAINING PROGRAM

## 2025-01-13 PROCEDURE — 25500020 PHARM REV CODE 255: Performed by: STUDENT IN AN ORGANIZED HEALTH CARE EDUCATION/TRAINING PROGRAM

## 2025-01-13 PROCEDURE — 36415 COLL VENOUS BLD VENIPUNCTURE: CPT

## 2025-01-13 RX ORDER — LIDOCAINE HYDROCHLORIDE 20 MG/ML
INJECTION INTRAVENOUS
Status: DISCONTINUED | OUTPATIENT
Start: 2025-01-13 | End: 2025-01-13

## 2025-01-13 RX ORDER — PROPOFOL 10 MG/ML
VIAL (ML) INTRAVENOUS CONTINUOUS PRN
Status: DISCONTINUED | OUTPATIENT
Start: 2025-01-13 | End: 2025-01-13

## 2025-01-13 RX ORDER — CYCLOSPORINE 50 MG/1
50 CAPSULE, LIQUID FILLED ORAL 2 TIMES DAILY
Qty: 60 CAPSULE | Refills: 11 | Status: CANCELLED | OUTPATIENT
Start: 2025-01-13

## 2025-01-13 RX ORDER — GADOBUTROL 604.72 MG/ML
10 INJECTION INTRAVENOUS
Status: COMPLETED | OUTPATIENT
Start: 2025-01-13 | End: 2025-01-13

## 2025-01-13 RX ORDER — LEVETIRACETAM 500 MG/1
500 TABLET ORAL 2 TIMES DAILY
Qty: 60 TABLET | Refills: 11 | Status: CANCELLED | OUTPATIENT
Start: 2025-01-13 | End: 2026-01-13

## 2025-01-13 RX ORDER — PHENYLEPHRINE HYDROCHLORIDE 10 MG/ML
INJECTION INTRAVENOUS
Status: DISCONTINUED | OUTPATIENT
Start: 2025-01-13 | End: 2025-01-13

## 2025-01-13 RX ORDER — DEXMEDETOMIDINE HYDROCHLORIDE 100 UG/ML
INJECTION, SOLUTION INTRAVENOUS
Status: DISCONTINUED | OUTPATIENT
Start: 2025-01-13 | End: 2025-01-13

## 2025-01-13 RX ORDER — ONDANSETRON HYDROCHLORIDE 2 MG/ML
INJECTION, SOLUTION INTRAVENOUS
Status: DISCONTINUED | OUTPATIENT
Start: 2025-01-13 | End: 2025-01-13

## 2025-01-13 RX ORDER — MIDAZOLAM HYDROCHLORIDE 1 MG/ML
INJECTION INTRAMUSCULAR; INTRAVENOUS
Status: DISCONTINUED | OUTPATIENT
Start: 2025-01-13 | End: 2025-01-13

## 2025-01-13 RX ORDER — GLUCAGON 1 MG
1 KIT INJECTION
Status: DISCONTINUED | OUTPATIENT
Start: 2025-01-13 | End: 2025-01-13 | Stop reason: HOSPADM

## 2025-01-13 RX ORDER — KETAMINE HCL IN 0.9 % NACL 50 MG/5 ML
SYRINGE (ML) INTRAVENOUS
Status: DISCONTINUED | OUTPATIENT
Start: 2025-01-13 | End: 2025-01-13

## 2025-01-13 RX ORDER — PROCHLORPERAZINE EDISYLATE 5 MG/ML
5 INJECTION INTRAMUSCULAR; INTRAVENOUS EVERY 30 MIN PRN
Status: DISCONTINUED | OUTPATIENT
Start: 2025-01-13 | End: 2025-01-13 | Stop reason: HOSPADM

## 2025-01-13 RX ORDER — DEXAMETHASONE SODIUM PHOSPHATE 4 MG/ML
INJECTION, SOLUTION INTRA-ARTICULAR; INTRALESIONAL; INTRAMUSCULAR; INTRAVENOUS; SOFT TISSUE
Status: DISCONTINUED | OUTPATIENT
Start: 2025-01-13 | End: 2025-01-13

## 2025-01-13 RX ADMIN — MIDAZOLAM HYDROCHLORIDE 2 MG: 2 INJECTION, SOLUTION INTRAMUSCULAR; INTRAVENOUS at 04:01

## 2025-01-13 RX ADMIN — GADOBUTROL 10 ML: 604.72 INJECTION INTRAVENOUS at 06:01

## 2025-01-13 RX ADMIN — APIXABAN 5 MG: 5 TABLET, FILM COATED ORAL at 08:01

## 2025-01-13 RX ADMIN — EMPAGLIFLOZIN 10 MG: 10 TABLET, FILM COATED ORAL at 08:01

## 2025-01-13 RX ADMIN — PHENYLEPHRINE HYDROCHLORIDE 200 MCG: 10 INJECTION INTRAVENOUS at 04:01

## 2025-01-13 RX ADMIN — PREGABALIN 75 MG: 75 CAPSULE ORAL at 08:01

## 2025-01-13 RX ADMIN — ONDANSETRON 4 MG: 2 INJECTION INTRAMUSCULAR; INTRAVENOUS at 05:01

## 2025-01-13 RX ADMIN — CYCLOSPORINE 50 MG: 25 CAPSULE, LIQUID FILLED ORAL at 08:01

## 2025-01-13 RX ADMIN — PHENYLEPHRINE HYDROCHLORIDE 0.4 MCG/KG/MIN: 10 INJECTION INTRAVENOUS at 05:01

## 2025-01-13 RX ADMIN — LIDOCAINE HYDROCHLORIDE 100 MG: 20 INJECTION INTRAVENOUS at 04:01

## 2025-01-13 RX ADMIN — SODIUM CHLORIDE, SODIUM GLUCONATE, SODIUM ACETATE, POTASSIUM CHLORIDE, MAGNESIUM CHLORIDE, SODIUM PHOSPHATE, DIBASIC, AND POTASSIUM PHOSPHATE: .53; .5; .37; .037; .03; .012; .00082 INJECTION, SOLUTION INTRAVENOUS at 05:01

## 2025-01-13 RX ADMIN — LORAZEPAM 0.5 MG: 0.5 TABLET ORAL at 02:01

## 2025-01-13 RX ADMIN — TACROLIMUS 0.5 MG: 0.5 CAPSULE ORAL at 08:01

## 2025-01-13 RX ADMIN — PROPOFOL 25 MCG/KG/MIN: 10 INJECTION, EMULSION INTRAVENOUS at 04:01

## 2025-01-13 RX ADMIN — LEVETIRACETAM 500 MG: 500 TABLET, FILM COATED ORAL at 08:01

## 2025-01-13 RX ADMIN — DEXMEDETOMIDINE 12 MCG: 100 INJECTION, SOLUTION, CONCENTRATE INTRAVENOUS at 04:01

## 2025-01-13 RX ADMIN — HYDROCODONE BITARTRATE AND ACETAMINOPHEN 1 TABLET: 7.5; 325 TABLET ORAL at 09:01

## 2025-01-13 RX ADMIN — LORAZEPAM 0.5 MG: 0.5 TABLET ORAL at 08:01

## 2025-01-13 RX ADMIN — SACUBITRIL AND VALSARTAN 1 TABLET: 49; 51 TABLET, FILM COATED ORAL at 08:01

## 2025-01-13 RX ADMIN — ATORVASTATIN CALCIUM 80 MG: 40 TABLET, FILM COATED ORAL at 08:01

## 2025-01-13 RX ADMIN — MUPIROCIN: 20 OINTMENT TOPICAL at 08:01

## 2025-01-13 RX ADMIN — Medication 20 MG: at 04:01

## 2025-01-13 RX ADMIN — DEXAMETHASONE SODIUM PHOSPHATE 4 MG: 4 INJECTION, SOLUTION INTRAMUSCULAR; INTRAVENOUS at 05:01

## 2025-01-13 RX ADMIN — HYDROCODONE BITARTRATE AND ACETAMINOPHEN 1 TABLET: 7.5; 325 TABLET ORAL at 08:01

## 2025-01-13 RX ADMIN — LORAZEPAM 0.5 MG: 0.5 TABLET ORAL at 06:01

## 2025-01-13 RX ADMIN — PROPOFOL 40 MG: 10 INJECTION, EMULSION INTRAVENOUS at 05:01

## 2025-01-13 RX ADMIN — DEXMEDETOMIDINE 8 MCG: 100 INJECTION, SOLUTION, CONCENTRATE INTRAVENOUS at 04:01

## 2025-01-13 NOTE — PLAN OF CARE
[facility] (H[facility])  Discharge Reassessment    Primary Care Provider: Kole Sharpe MD    Expected Discharge Date: 1/14/2025    Reassessment (most recent)       Discharge Reassessment - 01/13/25 1124          Discharge Reassessment    Assessment Type Discharge Planning Reassessment (P)      Did the patient's condition or plan change since previous assessment? No (P)      Discharge Plan discussed with: Patient (P)      Discharge Plan A Home with family (P)      Discharge Plan B Home with family (P)      DME Needed Upon Discharge  none (P)      Transition of Care Barriers None (P)      Why the patient remains in the hospital Requires continued medical care (P)         Post-Acute Status    Discharge Delays None known at this time (P)                    Discharge Plan A and Plan B have been determined by review of patient's clinical status, future medical and therapeutic needs, and coverage/benefits for post-acute care in coordination with multidisciplinary team members.    Pt will be getting an MRI today and required sedation for the MRI. Pt not medically ready for dc. When pt is medically ready pt with dc home with transportation provided by pts family.     Concepcion Ellis [facility] [place]  Ext. [phone]

## 2025-01-13 NOTE — SUBJECTIVE & OBJECTIVE
Interval History: Patient still exhibiting signs of concern in regards to his upcoming MRI and was insisting on breaking his fast in some occasions but staff and team were able to convince him to remain patient until MRI is done. Anesthesia on board regarding pre-MRI sedation.    Review of Systems  Objective:     Vital Signs (Most Recent):  Temp: 97.5 °F (36.4 °C) (01/13/25 1521)  Pulse: 66 (01/13/25 1521)  Resp: 12 (01/13/25 1521)  BP: (!) 180/81 (01/13/25 1521)  SpO2: 97 % (01/13/25 1521) Vital Signs (24h Range):  Temp:  [97.5 °F (36.4 °C)-98.8 °F (37.1 °C)] 97.5 °F (36.4 °C)  Pulse:  [55-66] 66  Resp:  [12-20] 12  SpO2:  [94 %-97 %] 97 %  BP: (112-190)/(63-81) 180/81     Weight: 94.7 kg (208 lb 12.4 oz)  Body mass index is 28.32 kg/m².    Intake/Output Summary (Last 24 hours) at 1/13/2025 1550  Last data filed at 1/12/2025 2109  Gross per 24 hour   Intake 160 ml   Output --   Net 160 ml         Physical Exam  HENT:      Head: Normocephalic.   Eyes:      Pupils: Pupils are equal, round, and reactive to light.   Cardiovascular:      Rate and Rhythm: Normal rate.   Pulmonary:      Effort: Pulmonary effort is normal.      Breath sounds: Normal breath sounds.   Abdominal:      Palpations: Abdomen is soft.      Tenderness: There is no guarding.   Skin:     Capillary Refill: Capillary refill takes less than 2 seconds.   Neurological:      Mental Status: He is alert and oriented to person, place, and time. Mental status is at baseline.   Psychiatric:      Comments: Patient is angry             Significant Labs: All pertinent labs within the past 24 hours have been reviewed.    Significant Imaging: I have reviewed all pertinent imaging results/findings within the past 24 hours.

## 2025-01-13 NOTE — ANESTHESIA PREPROCEDURE EVALUATION
01/13/2025  Tej Purdy is a 69 y.o., male.  [facility]  Anesthesia Pre-Operative Evaluation       Patient Name: Tej Purdy  YOB: 1955  MRN: [id number]  CSN: [id number]      Code Status: Full Code   Date of Procedure: 1/13/2025  Anesthesia: Choice Procedure: Procedure(s) (LRB):  MRI (Magnetic Resonance Imagine) (N/A)  Pre-Operative Diagnosis: Seizure [R56.9]  Proceduralist: Surgeons and Role:     * Surgeon, Gaby - Primary        SUBJECTIVE:   Tej Purdy is a 69 y.o. male who is here today for Procedure(s) (LRB):  MRI (Magnetic Resonance Imagine) (N/A).   Mr. Tej Purdy is a 69 year old male with a past history of CASTILLO cirrhosis, cholangiocarcinoma s/p liver transplant in 2022 on tacrolimus, DM2 on insulin, HTN, CAD s/p PCI 12/23, severe AS, HFrEF, persistent atrial fibrillation on eliquis, and PAD that was admitted after being found down by his wife. History is provided by patient and wife via telephone. They report that the patient was in his usual state of health leading up to the morning of 1/8. He woke up late, around noon, and went to the bathroom. Patient remembers nothing past this point. His wife heard a groan and found the patient confused and soiled with vomit/feces.    He was admitted for new seizure like activity with tonic-clonic activity witnessed by EMS en route the ED. Holding home Wellbutrin. Underwent EEG which showed focal left temporal slowing. Neurology noted possible left temporoparietal hypodensity on CT head. Differential diagnosis includes remote infarct, infection, tumor. Neurology recommended brain MRI. Patient has severe anxiety related to MRI. Not a candidate for general anesthesia due to risk profile. Planning MRI under moderate sedation with anesthesia. Patient continues on Keppra. Patient had prolonged episode of atrial  fibrillation with rapid ventricular response 1/10. Now resolved, back in NSR.     Anticoagulants   Medication Route Frequency    apixaban tablet 5 mg Oral BID       he has a current medication list which includes the following long-term medication(s): atorvastatin, blood-glucose meter, carvedilol, diazepam, furosemide, lantus solostar u-100 insulin, insulin lispro, fish oil-omega-3 fatty acids, pantoprazole, pregabalin, quetiapine, and tacrolimus.   ALLERGIES:     Review of patient's allergies indicates:   Allergen Reactions    Bee pollens Swelling     BEE STINGS swells body     LDA:          Lines/Drains/Airways       Peripheral Intravenous Line  Duration                  Peripheral IV - Single Lumen 01/11/25 1710 20 G No Right Antecubital 1 day                   RELEVANT MEDICATIONS:     Antibiotics (From admission, onward)      Start     Stop Route Frequency Ordered    01/09/25 0900  mupirocin 2 % ointment  (MRSA Decolonization Orders [facility])         01/14/25 0859 Nasl 2 times daily 01/09/25 0735          VTE Risk Mitigation (From admission, onward)           Ordered     apixaban tablet 5 mg  2 times daily         01/08/25 2034     IP VTE HIGH RISK PATIENT  Once         01/08/25 2034     Place sequential compression device  Until discontinued         01/08/25 2034                    History:     Active Hospital Problems    Diagnosis  POA    *Seizure [R56.9]  Yes    Hypercoagulable state due to atrial fibrillation [D68.69, I48.91]  Yes     On eliquis      History of skin cancer [Z85.828]  Not Applicable    Immunocompromised state [D84.9]  Yes     s/p liver transplant in 2022 on tacrolimus,       Hypertensive urgency [I16.0]  Yes     hypertensive up to SBP of 217   On carvedilol       Thrombocytopenia [D69.6]  Yes    Chronic systolic heart failure [I50.22]  Yes    History of liver transplant [Z94.4]  Not Applicable    S/P liver transplant [Z94.4]  Not Applicable     Chronic    Chronic anticoagulation [Z79.01]  Not  Applicable     On eliquis      Atrial fibrillation with rapid ventricular response [I48.91]  Yes    Coronary artery disease involving native coronary artery of native heart without angina pectoris [I25.10]  Yes     Chronic    Type 2 diabetes mellitus, with long-term current use of insulin [E11.9, Z79.4]  Not Applicable     Chronic    Leukocytosis [D72.829]  Yes    Primary hypertension [I10]  Yes     Chronic      Resolved Hospital Problems   No resolved problems to display.     Patient Active Problem List   Diagnosis    Mixed hyperlipidemia    Primary hypertension    Skin cancer    Kidney stone    Diabetic neuropathy    PAD (peripheral artery disease)    Leukocytosis    Abdominal pain    Type 2 diabetes mellitus, with long-term current use of insulin    Coronary artery disease involving native coronary artery of native heart without angina pectoris    Atrial fibrillation with rapid ventricular response    History of cardioversion    Chronic anticoagulation    S/P liver transplant    At risk for opportunistic infections    Prophylactic immunotherapy    Anemia of chronic disease    Long-term use of immunosuppressant medication    Weakness    Type 2 diabetes mellitus with hyperglycemia    Anemia due to unknown mechanism    Fatigue    Other ascites    Cholangiocarcinoma    Severe aortic stenosis    Unstable angina    Presence of drug-eluting stent in left circumflex coronary artery    Orthostatic dizziness    Nephrolithiasis    Chronic systolic heart failure    History of liver transplant    Iron deficiency anemia    Immunodeficiency due to drugs    S/P balloon aortic valvuloplasty    History of cholangiocarcinoma    Thrombocytopenia    Seizure    Hypercoagulable state due to atrial fibrillation    History of skin cancer    Immunocompromised state    Hypertensive urgency     Medical History   Past Medical History:   Diagnosis Date    Acute appendicitis 06/02/2023    Aortic stenosis     Atrial fibrillation      johny/cardioversion on 1/26/24    Calculus of ureter 12/22/2023    CHF (congestive heart failure)     Cholangiocarcinoma 03/16/2022    s/p liver transplant    Cirrhosis 11/23/2020    s/p transplant 2022 for CASTILLO cirrhosis and cholangiocarcinoma    Diabetes mellitus, type 2     c/b Diabetic neuropathy    HTN (hypertension) 11/23/2020    Hyperlipidemia 11/23/2020    Kidney stones 11/23/2020    S/p lithotripsy 2020    PAD (peripheral artery disease)     Skin cancer 11/23/2020     Surgical History:    has a past surgical history that includes Kidney stone surgery; hemorroid surgery; hemorroidectomy; Colonoscopy (10/2020); Esophagogastroduodenoscopy (10/2020); Excision of hydrocele (Right); Left heart catheterization (N/A, 1/27/2021); Right heart catheterization (Right, 5/7/2021); Treatment of cardiac arrhythmia (N/A, 5/19/2021); liver mass removal; Treatment of cardiac arrhythmia (N/A, 5/31/2021); Esophagogastroduodenoscopy (N/A, 7/1/2021); Treatment of cardiac arrhythmia (N/A, 7/7/2021); Treatment of cardiac arrhythmia (N/A, 7/27/2021); Liver transplant (N/A, 1/6/2022); Laparoscopic appendectomy (N/A, 6/2/2023); Esophageal manometry with measurement of impedance (N/A, 7/17/2023); ANGIOGRAM, CORONARY, WITH LEFT HEART CATHETERIZATION (N/A, 12/26/2023); stent, drug eluting, single vessel, coronary (12/26/2023); Treatment of cardiac arrhythmia (N/A, 1/26/2024); echocardiogram,transesophageal (N/A, 1/26/2024); and Aortic valvuloplasty (N/A, 2/1/2024).   Social History:    reports that he quit smoking about 45 years ago. His smoking use included cigarettes. He has quit using smokeless tobacco. He reports that he does not currently use alcohol. He reports that he does not use drugs.     OBJECTIVE:     Vital Signs (Most Recent):  Temp: 36.6 °C (97.9 °F) (01/13/25 1129)  Pulse: (!) 58 (01/13/25 1129)  Resp: 18 (01/13/25 1129)  BP: 127/73 (01/13/25 1129)  SpO2: 95 % (01/13/25 1129) Vital Signs Range (Last 24H):  Temp:  [36.5 °C  (97.7 °F)-37.1 °C (98.8 °F)]   Pulse:  [55-63]   Resp:  [18-20]   BP: (112-190)/(63-77)   SpO2:  [94 %-96 %]      Last 3 Vitals:       11/14/2024     1:11 PM 11/18/2024     1:28 PM 1/8/2025     2:30 PM   Vitals - 1 value per visit   SYSTOLIC 102 118 180   DIASTOLIC 51 74 82   Pulse 60 62 98   Temp   36.2 °C (97.2 °F)   Resp  18 16   SPO2 97 % 96 % 99 %   Weight (lb) 205.03 216.71 216   Weight (kg) 93 98.3 97.977   Height 6' (1.829 m) 6' (1.829 m) 6' (1.829 m)   BMI (Calculated) 27.8 29.4 29.3   Pain Score Five Four      Body mass index is 28.32 kg/m².   Wt Readings from Last 4 Encounters:   01/08/25 94.7 kg (208 lb 12.4 oz)   11/18/24 98.3 kg (216 lb 11.4 oz)   11/14/24 93 kg (205 lb 0.4 oz)   10/14/24 93 kg (205 lb)     Significant Labs:  Lab Results   Component Value Date    WBC 6.27 01/13/2025    HGB 11.7 (L) 01/13/2025    HCT 38.4 (L) 01/13/2025     (L) 01/13/2025     01/13/2025    K 4.2 01/13/2025     01/13/2025    CREATININE 1.1 01/13/2025    BUN 40 (H) 01/13/2025    CO2 22 (L) 01/13/2025     (H) 01/13/2025    CALCIUM 8.9 01/13/2025    MG 2.1 01/11/2025    PHOS 4.1 01/11/2025    ALKPHOS 70 01/13/2025    ALT 14 01/13/2025    AST 19 01/13/2025    ALBUMIN 3.2 (L) 01/13/2025    INR 1.3 (H) 01/13/2025    APTT 30.8 01/13/2025    HGBA1C 6.6 (H) 01/09/2025     (H) 01/08/2025    TROPONINI 0.022 01/27/2024     (H) 01/08/2025     No LMP for male patient.      EKG:   Results for orders placed or performed during the hospital encounter of 01/08/25   EKG 12-lead    Collection Time: 01/10/25 10:06 AM   Result Value Ref Range    QRS Duration 100 ms    OHS QTC Calculation 435 ms    Narrative    Test Reason : I48.91,    Vent. Rate : 120 BPM     Atrial Rate :    BPM     P-R Int :    ms          QRS Dur : 100 ms      QT Int : 308 ms       P-R-T Axes :    -48  75 degrees    QTcB Int : 435 ms    Atrial fibrillation with rapid ventricular response  Left anterior fascicular block  Nonspecific  ST abnormality  Abnormal ECG  When compared with ECG of 08-Jan-2025 15:13,  Atrial fibrillation has replaced Sinus rhythm  Confirmed by Kole Charles ([id number]) on 1/10/2025 4:54:45 PM    Referred By: AAAREFERRAL SELF           Confirmed By: Kole Charles       TTE:  Results for orders placed or performed during the hospital encounter of 10/14/24   Echo   Result Value Ref Range    TV resting pulmonary artery pressure 30 mmHg    RV TB RVSP 6 mmHg    Est. RA pres 3 mmHg    Narrative      Left Ventricle: The left ventricle is normal in size. Normal wall   thickness. There is normal systolic function with a visually estimated   ejection fraction of 60 - 65%.    Right Ventricle: Normal right ventricular cavity size. Wall thickness   is normal. Systolic function is normal.    Left Atrium: Left atrium is mildly dilated.    Aortic Valve: There is severe aortic valve sclerosis. Severely   restricted motion. There is severe stenosis. Aortic valve area by VTI is   0.7 cm2. Aortic valve peak velocity is 4.6 m/s. Mean gradient is 47.3   mmHg. The dimensionless index is 0.20. There is mild aortic regurgitation.    Mitral Valve: There is mild regurgitation.    Pulmonary Artery: No pulmonary hypertension. The estimated pulmonary   artery systolic pressure is 30 mmHg.    IVC/SVC: Normal venous pressure at 3 mmHg.       EF   Date Value Ref Range Status   07/05/2022 60 % Final   03/25/2022 60 % Final      No results found. However, due to the size of the patient record, not all encounters were searched. Please check Results Review for a complete set of results.  ASHISH:  Results for orders placed or performed during the hospital encounter of 01/26/24   Transesophageal echo (ASHISH)   Result Value Ref Range    BSA 2.18 m2    Left Atrial Appendage Peak Velocity 30.0 cm/s    Narrative      ASHISH prior to [facility]. The study was abbreviated due to high risk for   sedation (severely reduced LVEF, moderate-to-severe low flow AS).    Left Atrium: The left  atrial appendage appears normal. Appendage   velocity is reduced at less than 40 cm/sec. There is no thrombus in the   left atrial appendage or left atrium.       Stress Test:  No results found for this or any previous visit.     [facility]:  Results for orders placed during the hospital encounter of 01/27/24    Cardiac catheterization    Conclusion    The estimated blood loss was <50 mL.    Indication    Tej Purdy is a 68 y.o. male  referred by Dr Pascal for severe artic stenosis and acutely decompesated heart failure.      Operators  Surgeon(s) and Role:  * Shilo Carrasco MD - Primary  * Elfego Gosnalez MD - Fellow  * Chaitanya Estrada MD - Fellow  * Maninder Nova MD - Fellow    Description of procedure    The patient was brought to the catheterization lab in the fasting state, he was prepped in the usual sterile fashion.  Access was obtained using ultrasound guidance and the Seldinger technique in theright common femoral artery with a micropuncture kit. Two proglide devices were used to pre-close the artery and the 12 Fr sheath was inserted.  The aortic valve was crossed with the help of a AL2 catheter and a stiff straight glidewire. Using a 6Fr pigtail catheter a curved shaped stiff wire was placed in the ventricle. Hemodynamics were recorded. A balloon aortic valvuloplasty was performed with a 20 mm TrueFlow balloon. A pigtail catheter was repositioned in the ventricle and hemodynamics were recorded.The balloon, wire and sheaths were removed and the proglide sutures were tightened and hemostasis was confirmed. Distal pulses in the RLE were confirmed via Doppler.      LVEDP: 8 mmHg  Complications: none  Estimated blood loss: <50 cc  Contrast used: 10 cc      Summary:    Successful BAV with 20 mm True Flow balloon.    Recommendations:    Routine post cath care  Transfer to PACU  Early ambulation  Follow up in clinic to complete TAVR workup    Shilo Nugent MD [facility]  Interventional  Cardiology  Structural/Valvular heart disease    The procedure log was documented by No documenter listed and verified by Shilo Nugent MD.    Date: 2/1/2024  Time: 10:49 AM       ASSESSMENT/PLAN:         Pre-op Assessment    I have reviewed the Patient Summary Reports.     I have reviewed the Nursing Notes. I have reviewed the NPO Status.   I have reviewed the Medications.     Review of Systems  Cardiovascular:     Hypertension   CAD      Angina CHF            Cardiovascular Symptoms: Angina       Coronary Artery Disease:               Congestive Heart Failure (CHF)                Hypertension     Atrial Fibrillation     Renal/:  Chronic Renal Disease        Kidney Function/Disease             Hepatic/GI:      Liver Disease,            Liver Disease        Neurological:       Seizures           Seizure Disorder                          Endocrine:  Diabetes    Diabetes                          Physical Exam    Dental:  Most upper teeth missing, some lower teeth missing  No loose teeth per patient  Upper/lower partial dentures removed        Anesthesia Plan  Type of Anesthesia, risks & benefits discussed:    Anesthesia Type: Gen Natural Airway, MAC  Intra-op Monitoring Plan: Standard ASA Monitors  Post Op Pain Control Plan: IV/PO Opioids PRN  Induction:  IV  Informed Consent: Informed consent signed with the Patient and all parties understand the risks and agree with anesthesia plan.  All questions answered.   ASA Score: 3  Day of Surgery Review of History & Physical: H&P Update referred to the surgeon/provider.    Ready For Surgery From Anesthesia Perspective.     .

## 2025-01-13 NOTE — CONSULTS
[facility]  Hepatology  Consult Note    Patient Name: Tej Purdy  MRN: [id number]  Admission Date: 1/8/2025  Hospital Length of Stay: 5 days  Code Status: Full Code   Attending Provider: Fabio Akins MD   Consulting Provider: Susei Umaña MD  Primary Care Physician: Kole Sharpe MD  Principal Problem:Seizure    Inpatient consult to Hepatology  Consult performed by: Susie Umaña MD  Consult ordered by: Kylah Chatterjee DO        Subjective:     HPI: Tej Purdy is a 69 y.o. male with history of liver transplantation on 1/6/2022 for MASH cirrhosis and cholangiocarcinoma, post-LT ascites (high SAAG, high protein), CAD s/p stent to left circ, severe AS s/p balloon valvuloplasty with upcoming planned TAVR, afib s/p multiple cardioversions who was admitted on 1/8/24 after presenting to ED with seizure like activity. Hepatology is consulted for management of immunosuppression.      Tacro level yesterday 4.2, dose 0.5 BID. He was previously on tacro 1mg BID, changed to 0.5mg BID on 12/24 due to elevated levels when he was hospitalized for CHF.     Past Medical History:   Diagnosis Date    Acute appendicitis 06/02/2023    Aortic stenosis     Atrial fibrillation     johny/cardioversion on 1/26/24    Calculus of ureter 12/22/2023    CHF (congestive heart failure)     Cholangiocarcinoma 03/16/2022    s/p liver transplant    Cirrhosis 11/23/2020    s/p transplant 2022 for CASTILLO cirrhosis and cholangiocarcinoma    Diabetes mellitus, type 2     c/b Diabetic neuropathy    HTN (hypertension) 11/23/2020    Hyperlipidemia 11/23/2020    Kidney stones 11/23/2020    S/p lithotripsy 2020    PAD (peripheral artery disease)     Skin cancer 11/23/2020       Past Surgical History:   Procedure Laterality Date    ANGIOGRAM, CORONARY, WITH LEFT HEART CATHETERIZATION N/A 12/26/2023    Procedure: Angiogram, Coronary, with Left Heart Cath;  Surgeon: Carlos King MD;  Location: [facility] CATH LAB;   Service: Cardiology;  Laterality: N/A;    AORTIC VALVULOPLASTY N/A 2/1/2024    Procedure: REPAIR, AORTIC VALVE;  Surgeon: Shilo Carrasco MD;  Location: [facility] CATH LAB;  Service: Cardiology;  Laterality: N/A;    COLONOSCOPY  10/2020    ECHOCARDIOGRAM,TRANSESOPHAGEAL N/A 1/26/2024    Procedure: Transesophageal echo (ASHISH) intra-procedure log documentation;  Surgeon: Nick Mathur III, MD;  Location: [facility] EP LAB;  Service: Cardiology;  Laterality: N/A;    ESOPHAGEAL MANOMETRY WITH MEASUREMENT OF IMPEDANCE N/A 7/17/2023    Procedure: MANOMETRY, ESOPHAGUS, WITH IMPEDANCE MEASUREMENT;  Surgeon: Klarissa Zamora MD;  Location: [facility] ENDO (4TH FLR);  Service: Gastroenterology;  Laterality: N/A;  pt diabetic  liver txp  prep insructions sent to pt via email and portal -Jm    ESOPHAGOGASTRODUODENOSCOPY  10/2020    ESOPHAGOGASTRODUODENOSCOPY N/A 7/1/2021    Procedure: EGD (ESOPHAGOGASTRODUODENOSCOPY);  Surgeon: Jovan David MD;  Location: [facility] ENDO (4TH FLR);  Service: Endoscopy;  Laterality: N/A;  HCC. Listed for liver transplant. EGD for variceal surveillance.  cardiac clearance and blood thinenr approval received, see telephone encounter 6/23/21-BB  fully vaccinated-BB  labs same day of procedure-BB    EXCISION OF HYDROCELE Right     hemorroid surgery      hemorroidectomy      KIDNEY STONE SURGERY      LAPAROSCOPIC APPENDECTOMY N/A 6/2/2023    Procedure: APPENDECTOMY, LAPAROSCOPIC;  Surgeon: Shan Ross MD;  [facility];  Service: General;  Laterality: N/A;    LEFT HEART CATHETERIZATION N/A 1/27/2021    Procedure: Left heart cath;  Surgeon: Kris Shelton MD;  Location: [facility] CATH LAB;  Service: Cardiology;  Laterality: N/A;    liver mass removal      LIVER TRANSPLANT N/A 1/6/2022    Procedure: TRANSPLANT, LIVER;  Surgeon: Lizet Garcia MD;  [facility];  Service: Transplant;  Laterality: N/A;    RIGHT HEART CATHETERIZATION Right 5/7/2021    Procedure: INSERTION,  CATHETER, RIGHT HEART;  Surgeon: Jaden Schuster MD;  Location: [facility] CATH LAB;  Service: Cardiology;  Laterality: Right;    STENT, DRUG ELUTING, SINGLE VESSEL, CORONARY  2023    Procedure: Stent, Drug Eluting, Single Vessel, Coronary;  Surgeon: Carlos King MD;  Location: [facility] CATH LAB;  Service: Cardiology;;    TREATMENT OF CARDIAC ARRHYTHMIA N/A 2021    Procedure: CARDIOVERSION;  Surgeon: Didier Tilley MD;  Location: [facility] EP LAB;  Service: Cardiology;  Laterality: N/A;  a fib, dccv/johny, mac, dm. sscu    TREATMENT OF CARDIAC ARRHYTHMIA N/A 2021    Procedure: CARDIOVERSION;  Surgeon: Daniel Arambula MD;  Location: [facility] EP LAB;  Service: Cardiology;  Laterality: N/A;  afib, DCCV, anest., DM, 3 prep    TREATMENT OF CARDIAC ARRHYTHMIA N/A 2021    Procedure: Cardioversion or Defibrillation;  Surgeon: Didier Tilley MD;  Location: [facility] EP LAB;  Service: Cardiology;  Laterality: N/A;  AF, JOHNY (cancel if complaint), DCCV, MAC, DM, 3 Prep    TREATMENT OF CARDIAC ARRHYTHMIA N/A 2021    Procedure: Cardioversion or Defibrillation;  Surgeon: Didier Tilley MD;  Location: [facility] EP LAB;  Service: Cardiology;  Laterality: N/A;  AF, JOHNY (cx if complaint), DCCV, MAC, DM, 3 Prep    TREATMENT OF CARDIAC ARRHYTHMIA N/A 2024    Procedure: Cardioversion or Defibrillation;  Surgeon: Koko Knight MD;  Location: [facility] EP LAB;  Service: Cardiology;  Laterality: N/A;  AF, JOHNY, DCCV, MAC, DM, 3 Prep *ADENOSINE TEST* *LIVER TRANSPLANT PATIENT*       Family History   Problem Relation Name Age of Onset    Coronary artery disease Father      Colon cancer Neg Hx      Esophageal cancer Neg Hx         Social History     Socioeconomic History    Marital status:    Tobacco Use    Smoking status: Former     Current packs/day: 0.00     Types: Cigarettes     Quit date: 1980     Years since quittin.0    Smokeless tobacco: Former   Substance and Sexual Activity    Alcohol use: Not Currently    Drug  use: Never     Social Drivers of Health     Financial Resource Strain: Patient Declined (1/9/2025)    Overall Financial Resource Strain (CARDIA)     Difficulty of Paying Living Expenses: Patient declined   Food Insecurity: Patient Declined (1/9/2025)    Hunger Vital Sign     Worried About Running Out of Food in the Last Year: Patient declined     Ran Out of Food in the Last Year: Patient declined   Transportation Needs: Patient Declined (1/9/2025)    TRANSPORTATION NEEDS     Transportation : Patient declined   Physical Activity: Inactive (1/22/2024)    Exercise Vital Sign     Days of Exercise per Week: 0 days     Minutes of Exercise per Session: 0 min   Stress: Patient Declined (1/9/2025)    [employer] [place] [employer] - Occupational Stress Questionnaire     Feeling of Stress : Patient declined   Housing Stability: Patient Declined (1/9/2025)    Housing Stability Vital Sign     Unable to Pay for Housing in the Last Year: Patient declined     Homeless in the Last Year: Patient declined       No current facility-administered medications on file prior to encounter.     Current Outpatient Medications on File Prior to Encounter   Medication Sig Dispense Refill    apixaban (ELIQUIS) 5 mg Tab Take 1 tablet (5 mg total) by mouth 2 (two) times daily. 180 tablet 3    atorvastatin (LIPITOR) 80 MG tablet Take 1 tablet (80 mg total) by mouth once daily. 90 tablet 3    blood sugar diagnostic Strp To check BG 2 times daily, to use with insurance preferred meter (patient has a [employer] self-monitoring glucose meter) 100 strip 11    blood-glucose meter kit To check BG 2 times daily, to use with insurance preferred meter 1 each 0    carvediloL (COREG) 12.5 MG tablet Take 1 tablet (12.5 mg total) by mouth 2 (two) times daily with meals. 180 tablet 3    diazePAM (VALIUM) 5 MG tablet Take 1 tablet (5 mg total) by mouth as needed for Anxiety. Take 1 tab 30 min prior to MRI, take 2nd as needed 2 tablet 0    empagliflozin  "(JARDIANCE) 10 mg tablet Take 1 tablet (10 mg total) by mouth once daily. 90 tablet 3    ferrous sulfate (IRON) 325 mg (65 mg iron) Tab tablet Take 1 tablet (325 mg total) by mouth once daily. 30 tablet 2    furosemide (LASIX) 20 MG tablet Take 1 tablet (20 mg total) by mouth once daily. 90 tablet 3    HYDROcodone-acetaminophen (NORCO) 5-325 mg per tablet Take 1 tablet by mouth every 8 (eight) hours as needed for Pain. 12 tablet 0    insulin glargine U-100, Lantus, (LANTUS SOLOSTAR U-100 INSULIN) 100 unit/mL (3 mL) InPn pen Inject 22 Units into the skin every evening. 15 mL 11    insulin lispro (HUMALOG KWIKPEN INSULIN) 100 unit/mL pen Inject 6 Units into the skin 3 (three) times daily with meals. Plus sliding scale, MDD: 48 units 15 mL 5    lancets Misc To check BG 2 times daily, to use with insurance preferred meter 100 each 11    magnesium oxide (MAG-OX) 400 mg (241.3 mg magnesium) tablet Take 1 tablet (400 mg total) by mouth 2 (two) times daily. 60 tablet 11    multivitamin capsule Take 1 capsule by mouth once daily.      omega-3 fatty acids/fish oil (FISH OIL-OMEGA-3 FATTY ACIDS) 300-1,000 mg capsule Take 1 capsule by mouth once daily.       pantoprazole (PROTONIX) 40 MG tablet Take 1 tablet (40 mg total) by mouth once daily. 90 tablet 1    pen needle, diabetic (BD ULTRA-FINE GÉNESIS PEN NEEDLE) 32 gauge x 5/32" Ndle Use to inject insulin 4 times daily 200 each 11    pregabalin (LYRICA) 75 MG capsule Take 1 capsule (75 mg total) by mouth 2 (two) times daily. 60 capsule 0    QUEtiapine (SEROQUEL) 100 MG Tab Take 1 tablet (100 mg total) by mouth every evening. 90 tablet 1    sacubitriL-valsartan (ENTRESTO) 49-51 mg per tablet Take 1 tablet by mouth 2 (two) times daily. 180 tablet 3    tacrolimus (PROGRAF) 0.5 MG Cap Take 1 capsule (0.5 mg total) by mouth every 12 (twelve) hours. 60 capsule 11       Review of patient's allergies indicates:   Allergen Reactions    Bee pollens Swelling     BEE STINGS swells body "     ROS - negative except for otherwise stated in HPI    Objective:     Vitals:    01/13/25 0327   BP: 112/63   Pulse: (!) 59   Resp: 18   Temp: 98.2 °F (36.8 °C)     Physical Exam:  Constitutional:  not in acute distress and well developed  HENT: Head: Normal, normocephalic.  Eyes: conjunctiva clear and sclera nonicteric  Cardiovascular: 2+ distal pulses  Respiratory: normal chest expansion & respiratory effort and no accessory muscle use  GI: soft, non-tender, nondistended, and well healed surgical scar seen   Skin: normal color on visualized skin  Neurological: alert, oriented x3      Significant Labs:  Recent Labs   Lab 01/11/25  0344 01/12/25  0340 01/13/25  0334   HGB 10.9* 11.3* 11.7*       Lab Results   Component Value Date    WBC 6.27 01/13/2025    HGB 11.7 (L) 01/13/2025    HCT 38.4 (L) 01/13/2025    MCV 85 01/13/2025     (L) 01/13/2025       Lab Results   Component Value Date     01/13/2025    K 4.2 01/13/2025     01/13/2025    CO2 22 (L) 01/13/2025    BUN 40 (H) 01/13/2025    CREATININE 1.1 01/13/2025    CALCIUM 8.9 01/13/2025    ANIONGAP 8 01/13/2025    ESTGFRAFRICA >60.0 07/18/2022    EGFRNONAA >60.0 07/18/2022       Lab Results   Component Value Date    ALT 14 01/13/2025    AST 19 01/13/2025    GGT 80 (H) 02/28/2024    ALKPHOS 70 01/13/2025    BILITOT 0.7 01/13/2025       Lab Results   Component Value Date    INR 1.3 (H) 01/13/2025    INR 1.3 (H) 01/12/2025    INR 1.3 (H) 01/11/2025       Significant Imaging:  Reviewed pertinent radiology findings.       Assessment/Plan:     Tej Purdy is a 69 y.o. male with history of liver transplantation on 1/6/2022 for MASH cirrhosis and cholangiocarcinoma, post-LT ascites (high SAAG, high protein), CAD s/p stent to left circ, severe AS s/p balloon valvuloplasty with upcoming planned TAVR, afib s/p multiple cardioversions who was admitted on 1/8/24 after presenting to ED with seizure like activity. Hepatology is consulted for management of  immunosuppression. Labs show excellent allograft function on prograf monotherapy. Patient reports good compliance. Follows up with Dr. Maxwell in clinic.     Problem List:  Cholangiocarcinoma (status post liver transplant in 01/2022)  On chronic immunosuppression    Given patient with new history of seizure, will need to change from tacro to cyclosporine indefinitely.     Recommendations:  - DC tacro, start cyclosporine 50mg BID   - Daily CBC, CMP, AM cyclo levels    Thank you for involving us in the care of Tej Purdy. Please call with any additional questions, concerns or changes in the patient's clinical status.       Susie Umaña MD  Gastroenterology & Hepatology Fellow PGY IV  [facility]

## 2025-01-13 NOTE — PLAN OF CARE
Problem: Skin Injury Risk Increased  Goal: Skin Health and Integrity  Outcome: Progressing     Problem: Adult Inpatient Plan of Care  Goal: Plan of Care Review  Outcome: Progressing  Goal: Patient-Specific Goal (Individualized)  Description: Pt will maintain sbp below 180  Outcome: Progressing  Goal: Absence of Hospital-Acquired Illness or Injury  Outcome: Progressing     Problem: Diabetes Comorbidity  Goal: Blood Glucose Level Within Targeted Range  Outcome: Progressing     Problem: Fall Injury Risk  Goal: Absence of Fall and Fall-Related Injury  Outcome: Progressing     Problem: Wound  Goal: Optimal Coping  Outcome: Progressing   Pt resting quietly this shift. No c/o seizure activity this shift. Blood glucose monitored and treated for according to orders. Pt NPO for procedure this morning. Fall precautions in place. Call light in reach with instructions to call as needed.

## 2025-01-13 NOTE — PHARMACY MED REC
"  Admission Medication History     The home medication history was taken by Mary Alice Ulloa.    You may go to "Admission" then "Reconcile Home Medications" tabs to review and/or act upon these items.     The home medication list has been updated by the Pharmacy department.   Please read ALL comments highlighted in yellow.   Please address this information as you see fit.    Feel free to contact us if you have any questions or require assistance.        Medications listed below were obtained from: Patient/family and Analytic software- [email or web address] Medications   Medication Sig    apixaban (ELIQUIS) 5 mg Tab Take 1 tablet (5 mg total) by mouth 2 (two) times daily.      atorvastatin (LIPITOR) 80 MG tablet Take 1 tablet (80 mg total) by mouth once daily.       carvediloL (COREG) 12.5 MG tablet Take 1 tablet (12.5 mg total) by mouth 2 (two) times daily with meals.      diazePAM (VALIUM) 5 MG tablet Take 1 tablet (5 mg total) by mouth as needed for Anxiety.      empagliflozin (JARDIANCE) 10 mg tablet Take 1 tablet (10 mg total) by mouth once daily.      ferrous sulfate (IRON) 325 mg (65 mg iron) Tab tablet Take 1 tablet (325 mg total) by mouth once daily.      furosemide (LASIX) 20 MG tablet Take 1 tablet (20 mg total) by mouth once daily.      HYDROcodone-acetaminophen (NORCO) 5-325 mg per tablet Take 1 tablet by mouth every 8 (eight) hours as needed for Pain.      insulin glargine U-100, Lantus, (LANTUS SOLOSTAR U-100 INSULIN) 100 unit/mL (3 mL)  Inject 22 Units into the skin every evening per sliding scale      insulin lispro (HUMALOG KWIKPEN INSULIN) 100 unit/mL pen Inject 6 Units into the skin 3 (three) times daily with meals. Per sliding scale      magnesium oxide (MAG-OX) 400 mg (241.3 mg magnesium) tablet Take 1 tablet (400 mg total) by mouth 2 (two) times daily.      multivitamin capsule Take 1 capsule by mouth once daily.  .    omega-3 fatty acids/fish oil (FISH OIL-OMEGA-3 FATTY ACIDS) 300-1,000 mg capsule Take 1 " capsule by mouth once daily.       pantoprazole (PROTONIX) 40 MG tablet Take 1 tablet (40 mg total) by mouth once daily.      pregabalin (LYRICA) 75 MG capsule Take 1 capsule (75 mg total) by mouth 2 (two) times daily.      QUEtiapine (SEROQUEL) 100 MG Tab Take 1 tablet (100 mg total) by mouth every evening.      sacubitriL-valsartan (ENTRESTO) 49-51 mg per tablet Take 1 tablet by mouth 2 (two) times daily.      tacrolimus (PROGRAF) 0.5 MG Cap Take 1 capsule (0.5 mg total) by mouth every 12 (twelve) hours.         Mary Alice Ulloa  [id number]                .

## 2025-01-13 NOTE — CONSULTS
[facility] - Surgery ([facility])  Wound Care    Patient Name:  Tej Purdy   MRN:  [id number]  Date: 1/13/2025  Diagnosis: Seizure    Pt seen for wound consult- left hand and right mid arm, both skin tears. Pt sitting up at bed, SO at bedside. Pt said he is on blood thinners and any time he hits something, his skin tears and  he will start to bleed. Pt agreeable to assessment; see below. Left knee is intact, dried but discolored skin; may leave open to air. Left hand is a healing skin tear- dried scabs- covered with silicone border foam. Right am had a compression dressing due to uncontrolled bleeding, per pt. This was removed, wound began to bleed. Pressure held for at least 5 minutes, surgicel coagulant dressing and foam applied. Hemostasis obtained as no blood noted through dressings. Covered with a silicone border foam. No s/s infection to any wounds assessed at this visit. Explained to pt and SO how to care for skin at home, basic wound care, use of dressings, and s/s infection. Provided pt with wound care dressings. All questions answered. Pt should leave dressings in place for 1 week then change.     Recs:   Once a week, remove dressings to left hand and right arm; if needed, use adhesive remover spray or wipes or remove while in shower. Clean with NSS or soap/water; pat dry. If wounds remain open or scabbed, cover with a silicone border foam to protect. Repeat this process weekly until wounds are fully healed, no scabs, and skin is smooth/pink.       SANDRINE Santos, RN,WON  [facility] Wound/Ostomy  1/13/25 01/13/25 1220   WOCN Assessment   WOCN Total Time (mins) 60   Visit Date 01/13/25   Visit Time 1220   Consult Type New   WOCN Speciality Wound   Wound skin tear   Number of Wounds 3   Intervention assessed;applied;changed;chart review;coordination of care   Teaching on-going        Wound 01/08/25 2320 Skin Tear Left posterior Hand #1   Date First Assessed/Time First Assessed: 01/08/25 2320    Present on Original Admission: Yes  Primary Wound Type: Skin Tear  Side: Left  Orientation: posterior  Location: Hand  Wound Number: #1  Is this injury device related?: No   Wound Image    Dressing Appearance Intact;Dry;Dried drainage   Drainage Amount Scant   Drainage Characteristics/Odor Serosanguineous;Tan;No odor   Appearance Maroon;Red;Dry;Fibrin;Epithelialization;Closed/resurfaced   Periwound Area Ecchymotic;Dry   Care Cleansed with:;Sterile normal saline   Dressing Applied;Foam;Silicone   Periwound Care Skin barrier film applied        Wound 01/08/25 2320 Abrasion(s) Left anterior Knee #2   Date First Assessed/Time First Assessed: 01/08/25 2320   Present on Original Admission: Yes  Primary Wound Type: Abrasion(s)  Side: Left  Orientation: anterior  Location: Knee  Wound Number: #2  Is this injury device related?: No   Dressing Appearance Open to air   Drainage Amount None   Appearance Maroon;Red;Dry;Fibrin   Periwound Area Intact;Dry   Wound Edges Rolled/closed  (closed)        Wound 01/11/25 2245 Right proximal   Date First Assessed/Time First Assessed: 01/11/25 2245   Present on Original Admission: Yes  Side: Right  Orientation: proximal   Wound Image      History:     Past Medical History:   Diagnosis Date    Acute appendicitis 06/02/2023    Aortic stenosis     Atrial fibrillation     johny/cardioversion on 1/26/24    Calculus of ureter 12/22/2023    CHF (congestive heart failure)     Cholangiocarcinoma 03/16/2022    s/p liver transplant    Cirrhosis 11/23/2020    s/p transplant 2022 for CASTILLO cirrhosis and cholangiocarcinoma    Diabetes mellitus, type 2     c/b Diabetic neuropathy    HTN (hypertension) 11/23/2020    Hyperlipidemia 11/23/2020    Kidney stones 11/23/2020    S/p lithotripsy 2020    PAD (peripheral artery disease)     Skin cancer 11/23/2020       Social History     Socioeconomic History    Marital status:    Tobacco Use    Smoking status: Former     Current packs/day: 0.00     Types:  Cigarettes     Quit date: 1980     Years since quittin.0    Smokeless tobacco: Former   Substance and Sexual Activity    Alcohol use: Not Currently    Drug use: Never     Social Drivers of Health     Financial Resource Strain: Patient Declined (2025)    Overall Financial Resource Strain (CARDIA)     Difficulty of Paying Living Expenses: Patient declined   Food Insecurity: Patient Declined (2025)    Hunger Vital Sign     Worried About Running Out of Food in the Last Year: Patient declined     Ran Out of Food in the Last Year: Patient declined   Transportation Needs: Patient Declined (2025)    TRANSPORTATION NEEDS     Transportation : Patient declined   Physical Activity: Unknown (2024)    Exercise Vital Sign     Days of Exercise per Week: 0 days   Recent Concern: Physical Activity - Inactive (2024)    Exercise Vital Sign     Days of Exercise per Week: 0 days     Minutes of Exercise per Session: 0 min   Stress: Patient Declined (2025)    [employer] [place] [employer] - Occupational Stress Questionnaire     Feeling of Stress : Patient declined   Housing Stability: Patient Declined (2025)    Housing Stability Vital Sign     Unable to Pay for Housing in the Last Year: Patient declined     Homeless in the Last Year: Patient declined       Precautions:     Allergies as of 2025 - Reviewed 2025   Allergen Reaction Noted    Bee pollens Swelling 10/14/2014       WOC Assessment Details/Treatment   See above

## 2025-01-13 NOTE — CARE UPDATE
I have reviewed the chart of Tej Purdy who is hospitalized for the following:    Active Hospital Problems    Diagnosis    *Seizure    Hypercoagulable state due to atrial fibrillation     On eliquis      History of skin cancer    Immunocompromised state     s/p liver transplant in 2022 on tacrolimus,       Hypertensive urgency     hypertensive up to SBP of 217   On carvedilol       Thrombocytopenia    Chronic systolic heart failure    History of liver transplant    S/P liver transplant    Chronic anticoagulation     On eliquis      Atrial fibrillation with rapid ventricular response    Coronary artery disease involving native coronary artery of native heart without angina pectoris    Type 2 diabetes mellitus, with long-term current use of insulin    Leukocytosis    Primary hypertension        Mary Kay Miles NP  [facility]

## 2025-01-13 NOTE — ASSESSMENT & PLAN NOTE
Patient has paroxysmal (<7 days) atrial fibrillation. Patient is currently in sinus rhythm. [id number] Score: 3. Patient currently in A fib. Recently discontinued on amiodarone and started on metoprolol. [id number] score: 3     -- Continued home eliquis  -- Increased Carvedilol  -- Cardiology

## 2025-01-13 NOTE — ASSESSMENT & PLAN NOTE
Patient with history of liver transplant on tacrolimus who presents after being found down and then 2 witnessed episodes of seizure like activity. Takes Welbutrin for history of depression. No focal neurologic deficits but patient still acutely confused. Afebrile but WBC 22.1. UA negative. COVID/Flu negative. CT head and CT a/p unremarkable for acute pathology. Seizures likely in chronically ill patient on medications that can lower seizure threshold (namely Wellbutrin and tacrolimus) and possible infection with increased WBC. Underwent EEG which showed focal left temporal slowing. Neurology noted possible left temporoparietal hypodensity on CT head. Recommended brain MRI that we has agreed to do under light sedation.    Plan:  -Loaded with keppra, continue at 500mg BID  -Awaiting brain MRI   -F/u Neurology recs  - consider switching to escitalopram or sertraline on discharge (less effect on seizure threshold)  -Wellbutrin discontinued  -F/u tacrolimus level  -Cardiac monitoring  -Orthostatic vitals

## 2025-01-13 NOTE — PROGRESS NOTES
[facility] - Surgery ([facility])  [facility] Medicine  Progress Note    Patient Name: Tej Purdy  MRN: [id number]  Patient Class: IP- Inpatient   Admission Date: 1/8/2025  Length of Stay: 5 days  Attending Physician: Fabio Akins MD  Primary Care Provider: Kole Sharpe MD        Subjective     Principal Problem:Seizure        HPI:  Patient is a 70 yo male with a PMH of CASTILLO cirrhosis, cholangiocarcinoma s/p liver transplant in 2022 on tacrolimus, type 2 diabetes mellitus on insulin, hypertension, coronary artery disease s/p LHC +PCI (12/23), severe aortic stenosis, heart failure reduced ejection fraction with LVEF of 20-25%, persistent atrial fibrillation s/p several cardioversions on apixaban, and peripheral arterial disease who presents via EMS after being found down at home. Patient states he remembers falling and never lost consciousness but is currently confused and an unreliable historian. According to ED report, patient's wife was contacted by phone by ED physician. She heard patient moaning from their bathroom and found him confused, covered in feces and vomiting. Wife witnessed seizure like movement while waiting for EMS. She states he was in his normal state of health the previous night and takes his prescribed medications regularly. EMS transported him to [facility] and while in the ambulance, patient had a witnessed seizure like event with tonic-clonic movements that lasted less than a minute and terminated without medication. No known history of seizures. Takes Wellbutrin and Prograf which are seizure threshold lowering medications. Patient currently alert but confused and declines to answer most questions although does state he is thirsty and cold.     While in the ED: Patient afebrile and HDS. Placed on 2L NC for oxygen saturation in the 90s. No focal neurologic deficits noted but patient with diffuse weakness. Confused and disoriented. WBC 22.1. Bicarb 15. Glucose 261. . UA not  suspicious for UTI. CXR without obvious consolidations or opacities. CT head negative for acute stroke or bleed. CT a/p with operative change of liver transplant with continued prominence of the intrahepatic biliary ducts, similar to priors and healing fractures of the 9th through 11th right ribs. Patient was started on broad spectrum antibiotics and loaded with keppra. Admitted to hospital medicine for suspected new onset seizures.    Overview/Hospital Course:  Admitted with likely seizure that was witnessed by his wife. Patient started on Keppra and Neurology consult appreciated - Concern for seizure related to medication, PRES, illness. Ideally would obtain MRI brain, but the patient initially refused due to claustrophobia and a past unpleasant experience. EEG showed left temporal focal slowing. Neurology again recommended an MRI, and the patient expressed a desire to undergo it under general anesthesia. He was stable during the early morning rounds and was pushing for discharge. However, around 9 AM, we were notified that the patient is c/o chest pain, dizziness, and was tachycardic. ECG showed an irregular HR with no evidence of MI, and troponin levels were negative. He was scheduled for an MRI today. He requests general anesthesia, but the anesthesia team recommended light sedation due to his severe aortic stenosis and low ejection fraction. However, the patient refused. He has now agreed to do the MRI under light sedation but he wants his wife to be present.    Interval History: Patient still exhibiting signs of concern in regards to his upcoming MRI and was insisting on breaking his fast in some occasions but staff and team were able to convince him to remain patient until MRI is done. Anesthesia on board regarding pre-MRI sedation.    Review of Systems  Objective:     Vital Signs (Most Recent):  Temp: 97.5 °F (36.4 °C) (01/13/25 1521)  Pulse: 66 (01/13/25 1521)  Resp: 12 (01/13/25 1521)  BP: (!) 180/81  (01/13/25 1521)  SpO2: 97 % (01/13/25 1521) Vital Signs (24h Range):  Temp:  [97.5 °F (36.4 °C)-98.8 °F (37.1 °C)] 97.5 °F (36.4 °C)  Pulse:  [55-66] 66  Resp:  [12-20] 12  SpO2:  [94 %-97 %] 97 %  BP: (112-190)/(63-81) 180/81     Weight: 94.7 kg (208 lb 12.4 oz)  Body mass index is 28.32 kg/m².    Intake/Output Summary (Last 24 hours) at 1/13/2025 1550  Last data filed at 1/12/2025 2109  Gross per 24 hour   Intake 160 ml   Output --   Net 160 ml         Physical Exam  HENT:      Head: Normocephalic.   Eyes:      Pupils: Pupils are equal, round, and reactive to light.   Cardiovascular:      Rate and Rhythm: Normal rate.   Pulmonary:      Effort: Pulmonary effort is normal.      Breath sounds: Normal breath sounds.   Abdominal:      Palpations: Abdomen is soft.      Tenderness: There is no guarding.   Skin:     Capillary Refill: Capillary refill takes less than 2 seconds.   Neurological:      Mental Status: He is alert and oriented to person, place, and time. Mental status is at baseline.   Psychiatric:      Comments: Patient is angry             Significant Labs: All pertinent labs within the past 24 hours have been reviewed.    Significant Imaging: I have reviewed all pertinent imaging results/findings within the past 24 hours.    Assessment and Plan     * Seizure  Patient with history of liver transplant on tacrolimus who presents after being found down and then 2 witnessed episodes of seizure like activity. Takes Welbutrin for history of depression. No focal neurologic deficits but patient still acutely confused. Afebrile but WBC 22.1. UA negative. COVID/Flu negative. CT head and CT a/p unremarkable for acute pathology. Seizures likely in chronically ill patient on medications that can lower seizure threshold (namely Wellbutrin and tacrolimus) and possible infection with increased WBC. Underwent EEG which showed focal left temporal slowing. Neurology noted possible left temporoparietal hypodensity on CT head.  Recommended brain MRI that we has agreed to do under light sedation.    Plan:  -Loaded with keppra, continue at 500mg BID  -Awaiting brain MRI   -F/u Neurology recs  - consider switching to escitalopram or sertraline on discharge (less effect on seizure threshold)  -Wellbutrin discontinued  -F/u tacrolimus level  -Cardiac monitoring  -Orthostatic vitals      Hypercoagulable state due to atrial fibrillation        History of liver transplant        Chronic systolic heart failure  Transthoracic echo (TTE) complete 10/14/2024    Interpretation Summary    Left Ventricle: The left ventricle is normal in size. Normal wall thickness. There is normal systolic function with a visually estimated ejection fraction of 60 - 65%.    Right Ventricle: Normal right ventricular cavity size. Wall thickness is normal. Systolic function is normal.    Left Atrium: Left atrium is mildly dilated.    Aortic Valve: There is severe aortic valve sclerosis. Severely restricted motion. There is severe stenosis. Aortic valve area by VTI is 0.7 cm2. Aortic valve peak velocity is 4.6 m/s. Mean gradient is 47.3 mmHg. The dimensionless index is 0.20. There is mild aortic regurgitation.    Mitral Valve: There is mild regurgitation.    Pulmonary Artery: No pulmonary hypertension. The estimated pulmonary artery systolic pressure is 30 mmHg.    IVC/SVC: Normal venous pressure at 3 mmHg.    Previous Echo with EF of 20-25%.    -Continued on GDMT  -Lasix PRN      S/P liver transplant  PMH significant for cholangiocarcinoma (status post liver transplant in 01/2022).  -Continue home tacrolimus  -CMP and tacrolimus level daily  -Consider hepatology consult pending tacrolimus level for management of immunosuppression       Chronic anticoagulation        Atrial fibrillation with rapid ventricular response  Patient has paroxysmal (<7 days) atrial fibrillation. Patient is currently in sinus rhythm. [id number] Score: 3. Patient currently in A fib. Recently  discontinued on amiodarone and started on metoprolol. [id number] score: 3     -- Continued home eliquis  -- Increased Carvedilol  -- Cardiology        Coronary artery disease involving native coronary artery of native heart without angina pectoris  Patient with known CAD. Plavix previously discontinued.  -Continue on home eliquis and statin    Type 2 diabetes mellitus, with long-term current use of insulin  Lab Results   Component Value Date    HGBA1C 6.6 (H) 01/09/2025     Recent Labs     01/11/25  0730 01/11/25  1129 01/11/25  1637 01/11/25  2140 01/12/25  0741 01/12/25  1150   POCTGLUCOSE 147* 167* 178* 164* 160* 181*         - LDSSI  - cardiac/diabetic diet  - POCT glucose checks TIDWM and qhs  - goal glucose 140-180      Leukocytosis  WBC 22.1. Afebrile. History with symptoms possibly concerning for infection including weakness, vomiting after seizure episode and maybe chills but difficulty to distinguish in setting of possible new seizures. UA negative. COVID/Flu negative. CT head and CT a/p unremarkable for acute pathology. CXR and CT a/p unrevealing.    Concern for acute infection in patient with leukocytosis and new onset seizure activity. Unclear source but of note, patient has had a previous admission for leukocytosis related to suspected infection in abdomen before liver transplant. Could just be from seizures.     Plan:  -Continue on broad spectrum vanc and zosyn, tailor with culture data  -F/u blood cultures   -Continuing GDMT in patient with significant heart failure but without signs of hypotension  -Continue to monitor for signs and symptoms of infection    Primary hypertension  Patient's blood pressure range in the last 24 hours was: BP  Min: 125/60  Max: 180/79.The patient's inpatient anti-hypertensive regimen is listed below:  Current Antihypertensives  carvediloL tablet 12.5 mg, 2 times daily with meals, Oral    - Continued on home GDMT  -Hold for signs of sepsis or hypotension      VTE Risk  Mitigation (From admission, onward)           Ordered     apixaban tablet 5 mg  2 times daily         01/08/25 2034     IP VTE HIGH RISK PATIENT  Once         01/08/25 2034     Place sequential compression device  Until discontinued         01/08/25 2034                    Discharge Planning   FELISHA: 1/14/2025     Code Status: Full Code   Medical Readiness for Discharge Date:   Discharge Plan A: Home with family   Discharge Delays: None known at this time                    Ary Ferguson MD  Department of Hospital Medicine   [facility] - Surgery ([facility])

## 2025-01-14 VITALS
OXYGEN SATURATION: 95 % | HEIGHT: 72 IN | WEIGHT: 208.75 LBS | BODY MASS INDEX: 28.27 KG/M2 | SYSTOLIC BLOOD PRESSURE: 125 MMHG | TEMPERATURE: 98 F | DIASTOLIC BLOOD PRESSURE: 75 MMHG | RESPIRATION RATE: 18 BRPM | HEART RATE: 102 BPM

## 2025-01-14 LAB
ALBUMIN SERPL BCP-MCNC: 3.8 G/DL (ref 3.5–5.2)
ALP SERPL-CCNC: 91 U/L (ref 40–150)
ALT SERPL W/O P-5'-P-CCNC: 21 U/L (ref 10–44)
ANION GAP SERPL CALC-SCNC: 11 MMOL/L (ref 8–16)
APTT PPP: 30.2 SEC (ref 21–32)
AST SERPL-CCNC: 25 U/L (ref 10–40)
BASOPHILS # BLD AUTO: 0.02 K/UL (ref 0–0.2)
BASOPHILS NFR BLD: 0.2 % (ref 0–1.9)
BILIRUB SERPL-MCNC: 1 MG/DL (ref 0.1–1)
BUN SERPL-MCNC: 36 MG/DL (ref 8–23)
CALCIUM SERPL-MCNC: 9.4 MG/DL (ref 8.7–10.5)
CHLORIDE SERPL-SCNC: 107 MMOL/L (ref 95–110)
CO2 SERPL-SCNC: 19 MMOL/L (ref 23–29)
CREAT SERPL-MCNC: 1.3 MG/DL (ref 0.5–1.4)
CYCLOSPORINE BLD LC/MS/MS-MCNC: 14 NG/ML (ref 100–400)
DIFFERENTIAL METHOD BLD: ABNORMAL
EOSINOPHIL # BLD AUTO: 0 K/UL (ref 0–0.5)
EOSINOPHIL NFR BLD: 0 % (ref 0–8)
ERYTHROCYTE [DISTWIDTH] IN BLOOD BY AUTOMATED COUNT: 15.6 % (ref 11.5–14.5)
EST. GFR  (NO RACE VARIABLE): 58.7 ML/MIN/1.73 M^2
GLUCOSE SERPL-MCNC: 237 MG/DL (ref 70–110)
HCT VFR BLD AUTO: 45.5 % (ref 40–54)
HGB BLD-MCNC: 13.8 G/DL (ref 14–18)
IMM GRANULOCYTES # BLD AUTO: 0.05 K/UL (ref 0–0.04)
IMM GRANULOCYTES NFR BLD AUTO: 0.4 % (ref 0–0.5)
INR PPP: 1.4 (ref 0.8–1.2)
LYMPHOCYTES # BLD AUTO: 0.6 K/UL (ref 1–4.8)
LYMPHOCYTES NFR BLD: 5.1 % (ref 18–48)
MCH RBC QN AUTO: 25.9 PG (ref 27–31)
MCHC RBC AUTO-ENTMCNC: 30.3 G/DL (ref 32–36)
MCV RBC AUTO: 85 FL (ref 82–98)
MONOCYTES # BLD AUTO: 0.7 K/UL (ref 0.3–1)
MONOCYTES NFR BLD: 5.6 % (ref 4–15)
NEUTROPHILS # BLD AUTO: 10.4 K/UL (ref 1.8–7.7)
NEUTROPHILS NFR BLD: 88.7 % (ref 38–73)
NRBC BLD-RTO: 0 /100 WBC
PLATELET # BLD AUTO: 151 K/UL (ref 150–450)
PMV BLD AUTO: 12.5 FL (ref 9.2–12.9)
POCT GLUCOSE: 127 MG/DL (ref 70–110)
POCT GLUCOSE: 251 MG/DL (ref 70–110)
POTASSIUM SERPL-SCNC: 5.2 MMOL/L (ref 3.5–5.1)
PROT SERPL-MCNC: 7.7 G/DL (ref 6–8.4)
PROTHROMBIN TIME: 14.8 SEC (ref 9–12.5)
RBC # BLD AUTO: 5.33 M/UL (ref 4.6–6.2)
SODIUM SERPL-SCNC: 137 MMOL/L (ref 136–145)
WBC # BLD AUTO: 11.73 K/UL (ref 3.9–12.7)

## 2025-01-14 PROCEDURE — 80158 DRUG ASSAY CYCLOSPORINE: CPT

## 2025-01-14 PROCEDURE — 85025 COMPLETE CBC W/AUTO DIFF WBC: CPT

## 2025-01-14 PROCEDURE — 63600175 PHARM REV CODE 636 W HCPCS

## 2025-01-14 PROCEDURE — 85730 THROMBOPLASTIN TIME PARTIAL: CPT

## 2025-01-14 PROCEDURE — 25000003 PHARM REV CODE 250

## 2025-01-14 PROCEDURE — 36415 COLL VENOUS BLD VENIPUNCTURE: CPT

## 2025-01-14 PROCEDURE — 80053 COMPREHEN METABOLIC PANEL: CPT

## 2025-01-14 PROCEDURE — 25000003 PHARM REV CODE 250: Performed by: STUDENT IN AN ORGANIZED HEALTH CARE EDUCATION/TRAINING PROGRAM

## 2025-01-14 PROCEDURE — 85610 PROTHROMBIN TIME: CPT

## 2025-01-14 RX ORDER — CYCLOSPORINE 50 MG/1
50 CAPSULE, LIQUID FILLED ORAL 2 TIMES DAILY
Qty: 60 CAPSULE | Refills: 11 | Status: SHIPPED | OUTPATIENT
Start: 2025-01-14

## 2025-01-14 RX ORDER — LEVETIRACETAM 500 MG/1
500 TABLET ORAL 2 TIMES DAILY
Qty: 60 TABLET | Refills: 11 | Status: SHIPPED | OUTPATIENT
Start: 2025-01-14 | End: 2026-01-14

## 2025-01-14 RX ADMIN — CARVEDILOL 12.5 MG: 12.5 TABLET, FILM COATED ORAL at 08:01

## 2025-01-14 RX ADMIN — APIXABAN 5 MG: 5 TABLET, FILM COATED ORAL at 08:01

## 2025-01-14 RX ADMIN — PREGABALIN 75 MG: 75 CAPSULE ORAL at 08:01

## 2025-01-14 RX ADMIN — CYCLOSPORINE 50 MG: 25 CAPSULE, LIQUID FILLED ORAL at 08:01

## 2025-01-14 RX ADMIN — ATORVASTATIN CALCIUM 80 MG: 40 TABLET, FILM COATED ORAL at 08:01

## 2025-01-14 RX ADMIN — SACUBITRIL AND VALSARTAN 1 TABLET: 49; 51 TABLET, FILM COATED ORAL at 08:01

## 2025-01-14 RX ADMIN — INSULIN ASPART 3 UNITS: 100 INJECTION, SOLUTION INTRAVENOUS; SUBCUTANEOUS at 09:01

## 2025-01-14 RX ADMIN — LEVETIRACETAM 500 MG: 500 TABLET, FILM COATED ORAL at 08:01

## 2025-01-14 NOTE — NURSING
"At 11:45am patient became very hostile, upset and loud in room, demanding to be discharge now.   This nurse explain to patient, MD will put in discharge orders following patient rounds as was told to patient by Neuro team who visit patient at bedside 45 minutes prior to patient becoming agitated.  Patient stepped out of room into metz and began yelling out, " take this IV out of my arm. , Patient also stated," if someone do not take this IV out of my arm I will continue to shout and scream until you do.  Patient IV was removed  by nurse.    At  this time security was call to nursing unit.  Patient told multiple staff members he wanted to leave AMA.  An AMA form was given to patient, patient sign the form and then was escorted of the unit by security.  Charge nurse and MD on team was notified.    "

## 2025-01-14 NOTE — PLAN OF CARE
Problem: Skin Injury Risk Increased  Goal: Skin Health and Integrity  Outcome: Progressing     Problem: Adult Inpatient Plan of Care  Goal: Plan of Care Review  Outcome: Progressing  Goal: Patient-Specific Goal (Individualized)  Description: Pt will maintain sbp below 180  Outcome: Progressing     Problem: Diabetes Comorbidity  Goal: Blood Glucose Level Within Targeted Range  Outcome: Progressing     Problem: Fall Injury Risk  Goal: Absence of Fall and Fall-Related Injury  Outcome: Progressing     Problem: Wound  Goal: Optimal Coping  Outcome: Progressing   POC discussed with pt and wife. MRI obtained earlier today with pt tolerating well. No distress noted at this time. Pt ambulated to bathroom several times with standby assist. No seizure activity this shift. Fall precautions in place. Call light in reach with instructions to call for assistance as needed. Acknowledged understanding.

## 2025-01-14 NOTE — PLAN OF CARE
[facility] (H[facility])  Discharge Final Note    Primary Care Provider: Kole Sharpe MD    Expected Discharge Date: 1/14/2025    Final Discharge Note (most recent)       Final Note - 01/14/25 1209          Final Note    Assessment Type Final Discharge Note (P)      Anticipated Discharge Disposition Home or Self Care (P)         Post-Acute Status    Discharge Delays None known at this time (P)                    Important Message from Medicare  Important Message from Medicare regarding Discharge Appeal Rights: Given to patient/caregiver, Explained to patient/caregiver, Signed/date by patient/caregiver     Date IMM was signed: 01/14/25  Time IMM was signed: 1045    Contact Info       Kole Sharpe MD   Specialty: Family Medicine   Relationship: PCP - General    [address] [place] [address]   Phone: [phone]       Next Steps: Follow up          Discharge Plan A and Plan B have been determined by review of patient's clinical status, future medical and therapeutic needs, and coverage/benefits for post-acute care in coordination with multidisciplinary team members.    Future Appointments   Date Time Provider Department Center   1/22/2025 11:30 AM Alberto Vicente MD [facility] BRITTANY Reyes [facility]   1/22/2025  1:00 PM Shilo Carrasco MD [facility] CARROLL [facility]   1/23/2025  9:00 AM Kole Sharpe MD [facility]   1/27/2025  8:10 AM LAB, [facility]   3/17/2025  2:40 PM Kole Sharpe MD [facility]   3/18/2025  2:30 PM [facility] pt medically ready and will dc today. Pt with no needs from cm at this time and will dc home with transportation provided by pts family.     Concepcion Ellis JD [facility]  Case management   Ext. [phone]

## 2025-01-14 NOTE — ASSESSMENT & PLAN NOTE
Patient has paroxysmal (<7 days) atrial fibrillation. Patient is currently in sinus rhythm. [id number] Score: 3. Patient currently in A fib. Recently discontinued on amiodarone and started on metoprolol. [id number] score: 3     -- Continued home eliquis after discussion with gen neurology (amyloid angiopathy present on MRI)  -- Increased Carvedilol

## 2025-01-14 NOTE — TRANSFER OF CARE
Anesthesia Transfer of Care Note    Patient: Tej Purdy    Procedure(s) Performed: Procedure(s) (LRB):  MRI (Magnetic Resonance Imagine) (N/A)    Patient location: PACU    Anesthesia Type: general    Transport from OR: Transported from OR on 6-10 L/min O2 by face mask with adequate spontaneous ventilation    Post pain: adequate analgesia    Post assessment: no apparent anesthetic complications and tolerated procedure well    Post vital signs: stable    Level of consciousness: awake, alert and oriented    Nausea/Vomiting: no nausea/vomiting    Complications: none    Transfer of care protocol was followed      Last vitals: Visit Vitals  /74 (BP Location: Right arm, Patient Position: Lying)   Pulse 66   Temp 36.4 °C (97.5 °F) (Oral)   Resp 12   Ht 6' (1.829 m)   Wt 94.7 kg (208 lb 12.4 oz)   SpO2 97%   BMI 28.32 kg/m²

## 2025-01-14 NOTE — PLAN OF CARE
General Neurology Plan of Care    Mr. Tej Purdy is a 69 year old male with a past history of CASTILLO cirrhosis s/p liver transplant in 2022 on tacrolimus, CAD s/p PCI, severe AS, persistent atrial fibrillation on eliquis, DM2 on insulin, and HFrEF that is admitted after being found down with seizure like activity at home. Reported semiology and tongue laceration concerning for epileptic event. Patient has a leukocytosis without known source of infection after initial workup; improved overnight after initiation of empiric broad antibiotics. However, leukocytosis could be reactive in setting of recent seizure activity as well. Patient does report taking both wellbutrin and tacrolimus at home, agents known to lower seizure threshold. This episode could represent a provoked seizure in the setting of possible infection while taking agents that lower seizure threshold. Low concern for central source of infection given patient's clinical stability, improvement in mental status, and lack of neck rigidity.      EEG with focal L temporal slowing. CTH with mild area of L tempoparietal hypodensity possibly consistent with remote infarction, but correlating with focal slowing. Other differential includes solid malignancy, abscess, and encephalitis. Leukocytosis has resolved and patient continues to clinically improve although antibiotics have been discontinued. Patient will require MRI with genearl anesthesia given profound claustrophobia and previous traumatic experience.      MRI Brain was remarkable for mild burden of bitemporal and L parietal microhemorrhage in addition to chronic microvascular ischemic changes, ruling out structural epileptic foci. Given patient's abnormal EEG pattern, we will recommend patient to be continued on AED. Also recommending to continue holding tacrolimus/wellbutrin. MRI findings concerning for possible CAA. Recommending to continue eliquis at this time with plans to follow up in CAA  clinic.    Plan  -Continue keppra 500mg BID  -Discontinue wellbutrin and tacrolimus, appreciate assistance from transplant team  -Please place referrals to CAA clinic and to General Neurology on discharge  -Discussed seizure precautions, including [place] law against driving for 6 months, operating heavy machiner, ladders, swimming/bathing alone, cooking alone.    Neurology will sign off. Thank you for your consult. Please reach out with questions or changes int he clinical picture      Farhan Barrientos MD - PGY2  [facility]

## 2025-01-14 NOTE — SUBJECTIVE & OBJECTIVE
Subjective:     Interval History: Neurologically stable. Objectively feels well. MRI Brain yesterday with chronic ischemic disease and scant microhemorrhage in bilateral temporal and L parietal lobes.     Current Facility-Administered Medications   Medication Dose Route Frequency Provider Last Rate Last Admin    apixaban tablet 5 mg  5 mg Oral BID Shan Briscoe, DO   5 mg at 01/14/25 0856    atorvastatin tablet 80 mg  80 mg Oral Daily Shan Briscoe, DO   80 mg at 01/14/25 0856    carvediloL tablet 12.5 mg  12.5 mg Oral BID  Fabio Akins MD   12.5 mg at 01/14/25 0856    cycloSPORINE modified (NEORAL) capsule 50 mg  50 mg Oral BID Susie Umaña MD   50 mg at 01/14/25 0857    dextrose 50% injection 12.5 g  12.5 g Intravenous PRN Shan Briscoe, DO        dextrose 50% injection 25 g  25 g Intravenous PRN Shan Briscoe, DO        empagliflozin (Jardiance) tablet 10 mg  10 mg Oral Daily Shan Briscoe, DO   10 mg at 01/13/25 0838    glucagon (human recombinant) injection 1 mg  1 mg Intramuscular PRN Shan Briscoe, DO        glucose chewable tablet 16 g  16 g Oral PRN Shan Briscoe, DO        glucose chewable tablet 24 g  24 g Oral PRN Shan Briscoe, DO        HYDROcodone-acetaminophen 7.5-325 mg per tablet 1 tablet  1 tablet Oral Q6H PRN Shan Briscoe, DO   1 tablet at 01/13/25 2043    insulin aspart U-100 pen 0-5 Units  0-5 Units Subcutaneous QID (AC + HS) PRN Shan Briscoe DO   3 Units at 01/14/25 0907    ketorolac injection 15 mg  15 mg Intravenous Q6H PRN Fabio Akins MD   15 mg at 01/12/25 0548    levETIRAcetam tablet 500 mg  500 mg Oral BID Shan Briscoe, DO   500 mg at 01/14/25 0857    LORazepam injection 2 mg  2 mg Intravenous Q2H PRN Susie Green MD        LORazepam tablet 0.5 mg  0.5 mg Oral Q6H PRN Fabio Akins MD   0.5 mg at 01/13/25 2044    melatonin tablet 6 mg  6 mg Oral Nightly PRN Susie Green MD   6 mg at 01/12/25 0207    naloxone 0.4 mg/mL  injection 0.02 mg  0.02 mg Intravenous PRN Shan Briscoe DO        pregabalin capsule 75 mg  75 mg Oral BID Shan Briscoe DO   75 mg at 01/14/25 0856    sacubitriL-valsartan 49-51 mg per tablet 1 tablet  1 tablet Oral BID Raul Pascual MD   1 tablet at 01/14/25 0857    sodium chloride 0.9% flush 10 mL  10 mL Intravenous PRN Susie Green MD        sodium chloride 0.9% flush 10 mL  10 mL Intravenous Q12H PRN Shan Briscoe DO           Review of Systems   Constitutional:  Positive for chills and fatigue. Negative for fever.   HENT:  Negative for sinus pain, sneezing, sore throat and trouble swallowing.    Eyes:  Negative for visual disturbance.   Respiratory:  Negative for shortness of breath.    Gastrointestinal:  Negative for nausea and vomiting.   Neurological:  Negative for dizziness, tremors, seizures, syncope, facial asymmetry, speech difficulty, weakness, light-headedness, numbness and headaches.     Objective:     Vital Signs (Most Recent):  Temp: 98 °F (36.7 °C) (01/14/25 0718)  Pulse: 102 (01/14/25 0718)  Resp: 18 (01/14/25 0718)  BP: 125/75 (01/14/25 0718)  SpO2: 95 % (01/14/25 0718) Vital Signs (24h Range):  Temp:  [97.4 °F (36.3 °C)-98 °F (36.7 °C)] 98 °F (36.7 °C)  Pulse:  [] 102  Resp:  [12-19] 18  SpO2:  [93 %-97 %] 95 %  BP: (102-180)/(51-81) 125/75     Weight: 94.7 kg (208 lb 12.4 oz)  Body mass index is 28.32 kg/m².     Physical Exam  Vitals and nursing note reviewed.   Constitutional:       General: He is not in acute distress.     Appearance: Normal appearance. He is ill-appearing.   HENT:      Head: Normocephalic and atraumatic.      Nose: Nose normal.      Mouth/Throat:      Mouth: Mucous membranes are moist.      Pharynx: Oropharynx is clear.      Comments: Bilateral laceration of tongue L>R  Eyes:      General: No scleral icterus.     Conjunctiva/sclera: Conjunctivae normal.   Cardiovascular:      Rate and Rhythm: Normal rate.      Pulses: Normal pulses.   Pulmonary:       Effort: Pulmonary effort is normal. No respiratory distress.   Abdominal:      General: Abdomen is flat. There is no distension.   Musculoskeletal:      Cervical back: Neck supple. No rigidity.      Right lower leg: No edema.      Left lower leg: No edema.   Skin:     General: Skin is warm and dry.   Neurological:      Mental Status: He is alert.      Comments: Alert and oriented x4  Appropriately participating in conversation and follows simple commands without redirection  Good attention/concentration    PERRL, VFF  EOMi  Facial sensation intact  Hearing grossly intact  Face symmetric  Tongue midline  Shoulder shrug symmetric    MOTOR:  RUE: 4+/5 bicep, 4+/5 tricep, 4+/5 hand   LUE: 4+/5 bicep, 4+/5 tricep, 4+/5 hand   RLE: 5/5 iliopsoas, 4+/5 quadricep, 4+/5 hamstring, 4+/5 tibialis anterior, 4+/5 gastrocnemius  LLE: 4/5 iliopsoas, 4/5 quadricep, 4/5 hamstring, 5/5 tibialis anterior, 4+/5 gastrocnemius  **Motor exam of LLE limited by pain    Sensation:  Intact to light touch, temperature, vibration throughout all extremities    DTRs:  R: 2+ bicep, 2+ brachioradialis, 2+ patellar, 1+ achilles  L: 2+ bicep, 2+ brachioradialis, 2+ patellar, 1+ achilles  Downgoing toes bilaterally  No hoffmans    Coordination:  FNF intact                 Significant Labs: CBC:   Recent Labs   Lab 01/13/25  0334 01/14/25  0806   WBC 6.27 11.73   HGB 11.7* 13.8*   HCT 38.4* 45.5   * 151     CMP:   Recent Labs   Lab 01/13/25  0334 01/14/25  0806   * 237*    137   K 4.2 5.2*    107   CO2 22* 19*   BUN 40* 36*   CREATININE 1.1 1.3   CALCIUM 8.9 9.4   PROT 6.3 7.7   ALBUMIN 3.2* 3.8   BILITOT 0.7 1.0   ALKPHOS 70 91   AST 19 25   ALT 14 21   ANIONGAP 8 11     All pertinent lab results from the past 24 hours have been reviewed.    Significant Imaging: I have reviewed and interpreted all pertinent imaging results/findings within the past 24 hours.

## 2025-01-14 NOTE — NURSING
Security called for assistance due to pt screaming at RN & PCT in pt room and then standing in hallway and screaming and swearing at staff.

## 2025-01-14 NOTE — PLAN OF CARE
Hospital follow-up appointment was scheduled by DWIGHT Nunes.  The patient follow-up appointment was scheduled for 1/23/25 at 9:00 am at [facility].        Des QUINTANILLA, RSW  Case Management  [phone]

## 2025-01-14 NOTE — PROGRESS NOTES
instructions given and explained to patient and family with verbalized understanding. VSS. Denies N/V, tolerating PO fluids. Rates pain level as tolerable. Report given to NICHELLE gomez on NPU. No questions or concerns stated. Pt transported now to room with primary RN.

## 2025-01-14 NOTE — ASSESSMENT & PLAN NOTE
Patient with history of liver transplant on tacrolimus who presents after being found down and then 2 witnessed episodes of seizure like activity. Takes Welbutrin for history of depression. No focal neurologic deficits but patient still acutely confused. Afebrile but WBC 22.1. UA negative. COVID/Flu negative. CT head and CT a/p unremarkable for acute pathology. Seizures likely in chronically ill patient on medications that can lower seizure threshold (namely Wellbutrin and tacrolimus) and possible infection with increased WBC. Underwent EEG which showed focal left temporal slowing. Neurology noted possible left temporoparietal hypodensity on CT head. Recommended brain MRI that we has agreed to do under light sedation.    MRI: Numerous scattered patchy and confluent foci of T2 FLAIR signal abnormality throughout the supra supratentorial parenchyma configuration while nonspecific concerning for advanced chronic microvascular ischemic change..  Few punctate foci of T2 FLAIR signal hyperintensity supratentorial white matter while nonspecific concerning for remote microhemorrhages.  Mild degree of underlying amyloid angiopathy to be considered in differential    Plan:  -continue keppra 500mg bid  -Awaiting brain MRI   -- [facility]AA referral on discharge  -- gen neurology referral on discharge  -Wellbutrin discontinued

## 2025-01-14 NOTE — ASSESSMENT & PLAN NOTE
Communicated with general neurology who advised against stopping anticoagulation until patient is seen on followuo in the CAA clinic. Patient signed AMA. Medications sent to his pharmacy.

## 2025-01-14 NOTE — PLAN OF CARE
CHW met with patient/family at bedside. Patient experience rounding completed and reviewed the following.     Do you know your discharge plan? Yes Home ,      If yes, what is the plan? (Home, Home Health, Rehab, SNF, LTAC, or Other)     Have you discussed your needs and preferences with your SW/CM? Yes      If you are discharging home, do you have help at home? Yes   Patient family will assist.    Do you think you will need help additional at home at discharge?  No   Patient has no need for additional help.    Do you currently have difficulty keeping up with bills, affording medicine or buying food? Yes  Patient has help with bills, food, and medicine.    Assigned SW/CM notified of any patient/family needs or concerns. Appropriate resources provided to address patient's needs.  Concepcion QUINTANILLA, RSW  Case Management  [phone]

## 2025-01-14 NOTE — DISCHARGE SUMMARY
[facility] - Neurosurgery ([facility])  [facility]  Discharge Summary      Patient Name: Tej Purdy  MRN: [id number]  NORMA: [id number]  Patient Class: IP- Inpatient  Admission Date: 1/8/2025  Hospital Length of Stay: 6 days  Discharge Date and Time:  01/14/2025 2:48 PM  Attending Physician: Kitty att. providers found   Discharging Provider: Ary Ferguson MD  Primary Care Provider: Kole Sharpe MD  [facility] Team: [facility] [place] [facility] Ary Ferguson MD  Primary Care Team: [facility] [place] [facility]    HPI:   Patient is a 68 yo male with a PMH of CASTILLO cirrhosis, cholangiocarcinoma s/p liver transplant in 2022 on tacrolimus, type 2 diabetes mellitus on insulin, hypertension, coronary artery disease s/p LHC +PCI (12/23), severe aortic stenosis, heart failure reduced ejection fraction with LVEF of 20-25%, persistent atrial fibrillation s/p several cardioversions on apixaban, and peripheral arterial disease who presents via EMS after being found down at home. Patient states he remembers falling and never lost consciousness but is currently confused and an unreliable historian. According to ED report, patient's wife was contacted by phone by ED physician. She heard patient moaning from their bathroom and found him confused, covered in feces and vomiting. Wife witnessed seizure like movement while waiting for EMS. She states he was in his normal state of health the previous night and takes his prescribed medications regularly. EMS transported him to [facility] and while in the ambulance, patient had a witnessed seizure like event with tonic-clonic movements that lasted less than a minute and terminated without medication. No known history of seizures. Takes Wellbutrin and Prograf which are seizure threshold lowering medications. Patient currently alert but confused and declines to answer most questions although does state he is thirsty and cold.     While in the ED: Patient afebrile and HDS. Placed on 2L NC for oxygen  saturation in the 90s. No focal neurologic deficits noted but patient with diffuse weakness. Confused and disoriented. WBC 22.1. Bicarb 15. Glucose 261. . UA not suspicious for UTI. CXR without obvious consolidations or opacities. CT head negative for acute stroke or bleed. CT a/p with operative change of liver transplant with continued prominence of the intrahepatic biliary ducts, similar to priors and healing fractures of the 9th through 11th right ribs. Patient was started on broad spectrum antibiotics and loaded with keppra. Admitted to hospital medicine for suspected new onset seizures.    Procedure(s) (LRB):  MRI (Magnetic Resonance Imagine) (N/A)      Hospital Course:   Admitted with likely seizure that was witnessed by his wife. Patient started on Keppra and Neurology consult appreciated - Concern for seizure related to medication, PRES, illness. Ideally would obtain MRI brain, but the patient initially refused due to claustrophobia and a past unpleasant experience. EEG showed left temporal focal slowing. Neurology again recommended an MRI, and the patient expressed a desire to undergo it under general anesthesia. He was stable during the early morning rounds and was pushing for discharge. However, around 9 AM, we were notified that the patient is c/o chest pain, dizziness, and was tachycardic. ECG showed an irregular HR with no evidence of MI, and troponin levels were negative. He was scheduled for an MRI today. He requests general anesthesia, but the anesthesia team recommended light sedation due to his severe aortic stenosis and low ejection fraction. However, the patient refused. He has now agreed to do the MRI under light sedation but he wants his wife to be present. Patient's MRI showed element of mild amyloid angiopathy with hemorrhages. He is on anticoagulation. As per neuro, he is to remain anticoagulate until he is seen in the [facility] clinic (cerebral angio clinic) with Dr Chawla. Unfortunately,  patient became hostile and angry at the nurses out of nowhere and decided to leave AMA despite having very pleasant encounters with him in the morning. He has been exhibiting similar pushy/somewhat edgy behavior since admission as he was demanding things to go his way. Unfortunately, he left AMA and had to be escorted out of the floor with security despite being counseled against doing that. Medications and referrals on discharge have been ordered.     Goals of Care Treatment Preferences:  Code Status: Full Code      SDOH Screening:  The patient declined to be screened for utility difficulties, food insecurity, transport difficulties, housing insecurity, and interpersonal safety, so no concerns could be identified this admission.     Consults:   Consults (From admission, onward)          Status Ordering Provider     Inpatient consult to Hepatology  Once        Provider:  (Not yet assigned)    Completed SHRUTHI ROJAS     Inpatient consult to Neurology  Once        Provider:  (Not yet assigned)    Completed GARLAND RICHARDSON          Vitals:    01/13/25 2043 01/13/25 2358 01/14/25 0335 01/14/25 0718   BP:  116/66 124/65 125/75   BP Location:   Left arm    Patient Position:   Lying    Pulse:  63 69 102   Resp: 16 19 18 18   Temp:  97.4 °F (36.3 °C) 97.7 °F (36.5 °C) 98 °F (36.7 °C)   TempSrc:  Oral Oral Oral   SpO2:  (!) 93% 96% 95%   Weight:       Height:        Physical Exam  HENT:      Head: Normocephalic.   Eyes:      Pupils: Pupils are equal, round, and reactive to light.   Cardiovascular:      Rate and Rhythm: Normal rate.   Pulmonary:      Effort: Pulmonary effort is normal.      Breath sounds: Normal breath sounds.   Abdominal:      Palpations: Abdomen is soft.      Tenderness: There is no guarding.   Skin:     Capillary Refill: Capillary refill takes less than 2 seconds.   Neurological:      Mental Status: He is alert and oriented to person, place, and time. Mental status is at baseline.   Psychiatric:       Comments: Patient is more pleasant in today's morning encounter than he way yesterday    * Seizure  Patient with history of liver transplant on tacrolimus who presents after being found down and then 2 witnessed episodes of seizure like activity. Takes Welbutrin for history of depression. No focal neurologic deficits but patient still acutely confused. Afebrile but WBC 22.1. UA negative. COVID/Flu negative. CT head and CT a/p unremarkable for acute pathology. Seizures likely in chronically ill patient on medications that can lower seizure threshold (namely Wellbutrin and tacrolimus) and possible infection with increased WBC. Underwent EEG which showed focal left temporal slowing. Neurology noted possible left temporoparietal hypodensity on CT head. Recommended brain MRI that we has agreed to do under light sedation.    MRI: Numerous scattered patchy and confluent foci of T2 FLAIR signal abnormality throughout the supra supratentorial parenchyma configuration while nonspecific concerning for advanced chronic microvascular ischemic change..  Few punctate foci of T2 FLAIR signal hyperintensity supratentorial white matter while nonspecific concerning for remote microhemorrhages.  Mild degree of underlying amyloid angiopathy to be considered in differential    Plan:  -continue keppra 500mg bid  -- C[facility] referral on discharge  -- gen neurology referral on discharge  -   Wellbutrin discontinued        Hypercoagulable state due to atrial fibrillation    Communicated with general neurology who advised against stopping anticoagulation until patient is seen on followuo in the [facility] clinic. Patient signed AMA. Medications sent to his pharmacy.    History of liver transplant        Chronic systolic heart failure  Transthoracic echo (TTE) complete 10/14/2024    Interpretation Summary    Left Ventricle: The left ventricle is normal in size. Normal wall thickness. There is normal systolic function with a visually estimated ejection  fraction of 60 - 65%.    Right Ventricle: Normal right ventricular cavity size. Wall thickness is normal. Systolic function is normal.    Left Atrium: Left atrium is mildly dilated.    Aortic Valve: There is severe aortic valve sclerosis. Severely restricted motion. There is severe stenosis. Aortic valve area by VTI is 0.7 cm2. Aortic valve peak velocity is 4.6 m/s. Mean gradient is 47.3 mmHg. The dimensionless index is 0.20. There is mild aortic regurgitation.    Mitral Valve: There is mild regurgitation.    Pulmonary Artery: No pulmonary hypertension. The estimated pulmonary artery systolic pressure is 30 mmHg.    IVC/SVC: Normal venous pressure at 3 mmHg.    Previous Echo with EF of 20-25%.    -Continued on GDMT        S/P liver transplant  PMH significant for cholangiocarcinoma (status post liver transplant in 01/2022).  -Continue home tacrolimus  -CMP and tacrolimus level daily  -Consider hepatology consult pending tacrolimus level for management of immunosuppression       Chronic anticoagulation  Communicated with general neurology who advised against stopping anticoagulation until patient is seen on followuo in the [facility] clinic. Patient signed AMA. Medications sent to his pharmacy.      Atrial fibrillation with rapid ventricular response  Patient has paroxysmal (<7 days) atrial fibrillation. Patient is currently in sinus rhythm. [id number] Score: 3. Patient currently in A fib. Recently discontinued on amiodarone and started on metoprolol. [id number] score: 3     -- Continued home eliquis after discussion with gen neurology (amyloid angiopathy present on MRI)  -- Increased Carvedilol        Coronary artery disease involving native coronary artery of native heart without angina pectoris  Patient with known CAD. Plavix previously discontinued.  -Continue on home eliquis and statin    Type 2 diabetes mellitus, with long-term current use of insulin  Lab Results   Component Value Date    HGBA1C 6.6 (H) 01/09/2025      Recent Labs     01/11/25  0730 01/11/25  1129 01/11/25  1637 01/11/25  2140 01/12/25  0741 01/12/25  1150   POCTGLUCOSE 147* 167* 178* 164* 160* 181*         - resume home medications on discharge      Leukocytosis  WBC 22.1. Afebrile. History with symptoms possibly concerning for infection including weakness, vomiting after seizure episode and maybe chills but difficulty to distinguish in setting of possible new seizures. UA negative. COVID/Flu negative. CT head and CT a/p unremarkable for acute pathology. CXR and CT a/p unrevealing.    Concern for acute infection in patient with leukocytosis and new onset seizure activity. Unclear source but of note, patient has had a previous admission for leukocytosis related to suspected infection in abdomen before liver transplant. Could just be from seizures.     Plan:  -- received broad spectrum antibiotics  -- most recent blood cultures are negative.  -Continuing GDMT in patient with significant heart failure but without signs of hypotension      Primary hypertension  Patient's blood pressure range in the last 24 hours was: BP  Min: 125/60  Max: 180/79.The patient's inpatient anti-hypertensive regimen is listed below:  Current Antihypertensives  carvediloL tablet 12.5 mg, 2 times daily with meals, Oral    - Continued on home GDMT  -Hold for signs of sepsis or hypotension      Final Active Diagnoses:    Diagnosis Date Noted POA    PRINCIPAL PROBLEM:  Seizure [R56.9] 01/08/2025 Yes    Hypercoagulable state due to atrial fibrillation [D68.69, I48.91] 01/09/2025 Yes    History of skin cancer [Z85.828] 01/09/2025 Not Applicable    Immunocompromised state [D84.9] 01/09/2025 Yes    Hypertensive urgency [I16.0] 01/09/2025 Yes    Thrombocytopenia [D69.6] 09/20/2024 Yes    Chronic systolic heart failure [I50.22] 01/27/2024 Yes    History of liver transplant [Z94.4] 01/27/2024 Not Applicable    S/P liver transplant [Z94.4] 01/06/2022 Not Applicable     Chronic    Chronic  anticoagulation [Z79.01] 06/18/2021 Not Applicable    Atrial fibrillation with rapid ventricular response [I48.91] 05/14/2021 Yes    Coronary artery disease involving native coronary artery of native heart without angina pectoris [I25.10] 01/25/2021 Yes     Chronic    Type 2 diabetes mellitus, with long-term current use of insulin [E11.9, Z79.4] 01/19/2021 Not Applicable     Chronic    Leukocytosis [D72.829] 01/15/2021 Yes    Primary hypertension [I10] 11/23/2020 Yes     Chronic      Problems Resolved During this Admission:       Discharged Condition: against medical advice    Disposition: Home or Self Care    Follow Up:   Follow-up Information       Kole Sharpe MD .    Specialty: Family Medicine  Contact information:  [address] [place] [id number] [phone]                           Patient Instructions:      Ambulatory referral/consult to Neurology   Standing Status: Future   Referral Priority: Routine Referral Type: Consultation   Referral Reason: Specialty Services Required   Requested Specialty: Neurology   Number of Visits Requested: 1     Ambulatory referral/consult to Neurology   Standing Status: Future   Referral Priority: Routine Referral Type: Consultation   Referral Reason: Specialty Services Required   Requested Specialty: Neurology   Number of Visits Requested: 1     Ambulatory referral/consult to Cerebral Amyloid Angiopathy (CAA)   Standing Status: Future   Referral Priority: Routine Referral Type: Consultation   Referral Reason: Specialty Services Required   Requested Specialty: Vascular Neurology   Number of Visits Requested: 1       Significant Diagnostic Studies: Labs: BMP:   Recent Labs   Lab 01/13/25  0334 01/14/25  0806   * 237*    137   K 4.2 5.2*    107   CO2 22* 19*   BUN 40* 36*   CREATININE 1.1 1.3   CALCIUM 8.9 9.4   , CMP   Recent Labs   Lab 01/13/25  0334 01/14/25  0806    137   K 4.2 5.2*    107   CO2 22* 19*   * 237*  "  BUN 40* 36*   CREATININE 1.1 1.3   CALCIUM 8.9 9.4   PROT 6.3 7.7   ALBUMIN 3.2* 3.8   BILITOT 0.7 1.0   ALKPHOS 70 91   AST 19 25   ALT 14 21   ANIONGAP 8 11   , CBC   Recent Labs   Lab 01/13/25  0334 01/14/25  0806   WBC 6.27 11.73   HGB 11.7* 13.8*   HCT 38.4* 45.5   * 151   , INR   Lab Results   Component Value Date    INR 1.4 (H) 01/14/2025    INR 1.3 (H) 01/13/2025    INR 1.3 (H) 01/12/2025   , Lipid Panel   Lab Results   Component Value Date    CHOL 89 (L) 09/18/2024    HDL 26 (L) 09/18/2024    LDLCALC 36.2 (L) 09/18/2024    TRIG 134 09/18/2024    CHOLHDL 29.2 09/18/2024   , Troponin No results for input(s): "TROPONINI" in the last 168 hours., A1C:   Recent Labs   Lab 09/18/24  1000 01/09/25  0715   HGBA1C 5.1 6.6*   , and All labs within the past 24 hours have been reviewed    Pending Diagnostic Studies:       None           Medications:  Reconciled Home Medications:      Medication List        START taking these medications      cycloSPORINE modified (NEORAL) 50 MG capsule  Commonly known as: NEORAL  Take 1 capsule (50 mg total) by mouth 2 (two) times daily.     levETIRAcetam 500 MG Tab  Commonly known as: KEPPRA  Take 1 tablet (500 mg total) by mouth 2 (two) times daily.            CHANGE how you take these medications      LANTUS SOLOSTAR U-100 INSULIN 100 unit/mL (3 mL) Inpn pen  Generic drug: insulin glargine U-100 (Lantus)  Inject 22 Units into the skin every evening.  What changed: additional instructions            CONTINUE taking these medications      atorvastatin 80 MG tablet  Commonly known as: LIPITOR  Take 1 tablet (80 mg total) by mouth once daily.     BD ULTRA-FINE GÉNESIS PEN NEEDLE 32 gauge x 5/32" Ndle  Generic drug: pen needle, diabetic  Use to inject insulin 4 times daily     blood sugar diagnostic Strp  To check BG 2 times daily, to use with insurance preferred meter (patient has a True[employer] self-monitoring glucose meter)     blood-glucose meter kit  To check BG 2 times daily, to " use with insurance preferred meter     carvediloL 12.5 MG tablet  Commonly known as: COREG  Take 1 tablet (12.5 mg total) by mouth 2 (two) times daily with meals.     diazePAM 5 MG tablet  Commonly known as: VALIUM  Take 1 tablet (5 mg total) by mouth as needed for Anxiety. Take 1 tab 30 min prior to MRI, take 2nd as needed     ELIQUIS 5 mg Tab  Generic drug: apixaban  Take 1 tablet (5 mg total) by mouth 2 (two) times daily.     ENTRESTO 49-51 mg per tablet  Generic drug: sacubitriL-valsartan  Take 1 tablet by mouth 2 (two) times daily.     FeroSuL 325 mg (65 mg iron) Tab tablet  Generic drug: ferrous sulfate  Take 1 tablet (325 mg total) by mouth once daily.     fish oil-omega-3 fatty acids 300-1,000 mg capsule  Take 1 capsule by mouth once daily.     furosemide 20 MG tablet  Commonly known as: LASIX  Take 1 tablet (20 mg total) by mouth once daily.     HumaLOG KwikPen Insulin 100 unit/mL pen  Generic drug: insulin lispro  Inject 6 Units into the skin 3 (three) times daily with meals. Plus sliding scale, MDD: 48 units     HYDROcodone-acetaminophen 5-325 mg per tablet  Commonly known as: NORCO  Take 1 tablet by mouth every 8 (eight) hours as needed for Pain.     JARDIANCE 10 mg tablet  Generic drug: empagliflozin  Take 1 tablet (10 mg total) by mouth once daily.     lancets Misc  To check BG 2 times daily, to use with insurance preferred meter     magnesium oxide 400 mg (241.3 mg magnesium) tablet  Commonly known as: MAG-OX  Take 1 tablet (400 mg total) by mouth 2 (two) times daily.     multivitamin capsule  Take 1 capsule by mouth once daily.     pantoprazole 40 MG tablet  Commonly known as: PROTONIX  Take 1 tablet (40 mg total) by mouth once daily.     pregabalin 75 MG capsule  Commonly known as: LYRICA  Take 1 capsule (75 mg total) by mouth 2 (two) times daily.     QUEtiapine 100 MG Tab  Commonly known as: SEROQUEL  Take 1 tablet (100 mg total) by mouth every evening.            STOP taking these medications       tacrolimus 0.5 MG Cap  Commonly known as: PROGRAF              Indwelling Lines/Drains at time of discharge:   Lines/Drains/Airways       None                   Time spent on the discharge of patient: 45 minutes         Ary Ferguson MD  Department of Hospital Medicine  [facility] - Neurosurgery ([facility])

## 2025-01-14 NOTE — ANESTHESIA POSTPROCEDURE EVALUATION
Anesthesia Post Evaluation    Patient: Tej Purdy    Procedure(s) Performed: Procedure(s) (LRB):  MRI (Magnetic Resonance Imagine) (N/A)    Final Anesthesia Type: general      Patient location during evaluation: PACU  Patient participation: Yes- Able to Participate  Level of consciousness: awake and alert  Post-procedure vital signs: reviewed and stable  Pain management: adequate  Airway patency: patent    PONV status at discharge: No PONV  Anesthetic complications: no      Cardiovascular status: blood pressure returned to baseline and hemodynamically stable  Respiratory status: unassisted, spontaneous ventilation and room air  Hydration status: euvolemic                Vitals Value Taken Time   /60 01/13/25 1845   Temp 36.6 °C (97.8 °F) 01/13/25 1830   Pulse 58 01/13/25 1845   Resp 18 01/13/25 1845   SpO2 96 % 01/13/25 1845         No case tracking events are documented in the log.      Pain/Cordelia Score: Pain Rating Prior to Med Admin: 7   Pain Rating Post Med Admin: 4

## 2025-01-15 ENCOUNTER — PATIENT OUTREACH (OUTPATIENT)
Dept: ADMINISTRATIVE | Facility: CLINIC | Age: 70
End: 2025-01-15
Payer: MEDICARE

## 2025-01-15 NOTE — PROGRESS NOTES
C3 nurse spoke with Tej Purdy  for a TCC post hospital discharge follow up call. The patient has a scheduled HOSFU appointment with Kole Sharpe MD on 01/23/2025 @ 0900.    Message sent to PCP staff.

## 2025-01-23 ENCOUNTER — PATIENT MESSAGE (OUTPATIENT)
Dept: CARDIOLOGY | Facility: CLINIC | Age: 70
End: 2025-01-23
Payer: MEDICARE

## 2025-01-25 ENCOUNTER — OFFICE VISIT (OUTPATIENT)
Dept: CARDIOLOGY | Facility: CLINIC | Age: 70
End: 2025-01-25
Payer: MEDICARE

## 2025-01-25 ENCOUNTER — EDUCATION (OUTPATIENT)
Dept: CARDIOLOGY | Facility: CLINIC | Age: 70
End: 2025-01-25

## 2025-01-25 VITALS
BODY MASS INDEX: 28.71 KG/M2 | HEIGHT: 72 IN | HEART RATE: 56 BPM | OXYGEN SATURATION: 98 % | WEIGHT: 212 LBS | DIASTOLIC BLOOD PRESSURE: 62 MMHG | SYSTOLIC BLOOD PRESSURE: 133 MMHG

## 2025-01-25 DIAGNOSIS — I35.0 SEVERE AORTIC STENOSIS: Primary | ICD-10-CM

## 2025-01-25 PROCEDURE — 1125F AMNT PAIN NOTED PAIN PRSNT: CPT | Mod: CPTII,S$GLB,, | Performed by: INTERNAL MEDICINE

## 2025-01-25 PROCEDURE — 99215 OFFICE O/P EST HI 40 MIN: CPT | Mod: S$GLB,,, | Performed by: INTERNAL MEDICINE

## 2025-01-25 PROCEDURE — 3008F BODY MASS INDEX DOCD: CPT | Mod: CPTII,S$GLB,, | Performed by: INTERNAL MEDICINE

## 2025-01-25 PROCEDURE — 3288F FALL RISK ASSESSMENT DOCD: CPT | Mod: CPTII,S$GLB,, | Performed by: INTERNAL MEDICINE

## 2025-01-25 PROCEDURE — 1100F PTFALLS ASSESS-DOCD GE2>/YR: CPT | Mod: CPTII,S$GLB,, | Performed by: INTERNAL MEDICINE

## 2025-01-25 PROCEDURE — 3044F HG A1C LEVEL LT 7.0%: CPT | Mod: CPTII,S$GLB,, | Performed by: INTERNAL MEDICINE

## 2025-01-25 PROCEDURE — 99999 PR PBB SHADOW E&M-EST. PATIENT-LVL V: CPT | Mod: PBBFAC,,, | Performed by: INTERNAL MEDICINE

## 2025-01-25 PROCEDURE — 1159F MED LIST DOCD IN RCRD: CPT | Mod: CPTII,S$GLB,, | Performed by: INTERNAL MEDICINE

## 2025-01-25 PROCEDURE — 3078F DIAST BP <80 MM HG: CPT | Mod: CPTII,S$GLB,, | Performed by: INTERNAL MEDICINE

## 2025-01-25 PROCEDURE — 1111F DSCHRG MED/CURRENT MED MERGE: CPT | Mod: CPTII,S$GLB,, | Performed by: INTERNAL MEDICINE

## 2025-01-25 PROCEDURE — 3075F SYST BP GE 130 - 139MM HG: CPT | Mod: CPTII,S$GLB,, | Performed by: INTERNAL MEDICINE
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
## 2025-01-25 NOTE — ASSESSMENT & PLAN NOTE
Tej Purdy is a 69 y.o. male referred by Dr Estevez for evaluation of severe AS (NYHA Class II sx).    The patient has undergone the following TAVR work-up:   ECHO (Date 10.14.24): CHRIS= 0.7 cm2, MG= 46mmHg, Peak Chandra= 4.7 m/s, EF= 55%.   [facility] (Date 12.23): S/P PCI to OMB  STS: 2%   Frailty: 2/4   Iliacs are >7    LVOT area by CTA is 5.6 cm2 (30 mm X 25 mm) and Avg Diameter is 26.7 per my independent review of images on babberly.  Incidental findings on CT: Non contributory  CT Surgery risk assessment:  Pending  Rhythm issues: Afib  KCCQ/5 meter walk: Done  Comorbidities: Cirrhosis, CAD      Tej Purdy is a 34 mm  Evolut valve candidate via RTF access.

## 2025-01-25 NOTE — PROGRESS NOTES
TRANSCATHETER AORTIC VALVE REPLACEMENT      PLEASE READ ALL INSTRUCTIONS CAREFULLY     You have been scheduled for your valve replacement on Tuesday, February 18, 2025.     Please report to the Cardiology Waiting Area on the Third floor of the hospital and check in at 8 AM.   You will then be taken to the SSCU (Short Stay Cardiac Unit) and prepared for your procedure. Please be aware that this is not the time of your procedure but the time you are to arrive.     Preperations for your procedure:  Shower with Dial soap the night before and the morning of your procedure.  Nothing to eat or drink after MIDNIGHT the night before.                                            You may take your regular morning medications with as much water or clear liquid as necessary.   Bring your cane and/or walker with you to the hospital.  Bring CPAP/BiPAP, or oxygen if you are on oxygen at home.  Call the office for any signs of infection ( fever, cough, pneumonia, urinary tract, etc.) We cannot implant a device in an infected patient.    Medications:  You may take your regular scheduled medications the morning of your procedure with as much water as necessary.  If you are diabetic and on Metformin (Glucophage), do not take it the day before, the day of, and 2 days after your procedure.  Hold ELIQUIS 3 days prior to your procedure.(Last dose will be 2/14/25)  If you take a weekly GLP-1 Inhibitor  You must be off that medication for one full week prior to your procedure.  Anesthesia will cancel your procedure if you do not hold this medication for a minimum of 7 days prior to procedure.       How long will the procedure take?  The procedure takes approximately three to four hours.  After the procedure, you will go to an ICU or the Cardiac Step Down Unit.  The decision will be made by the valve team.  You will be monitored closely for the next several hours and remain overnight.       When can I go home?  Your length of stay in the  hospital, will be determined by your physician.  Be prepared to stay 1-3 days.  You will be discharged when your physician thinks it is safe for you to go home.  You must have someone to drive you home when you are discharged.      Follow Up After Valve Replacement:  You will be scheduled for follow up in one month and one year with an echo, lab work, and a clinic visit.    If you are in a research trial, your follow up will be slightly different and according to the trial requirements.  You can follow up with your regular cardiologist regarding any other heart issues.  DO NOT SCHEDULE ANY ELECTIVE PROCEDURES FOR 6 MONTHS AFTER TAVR.  THIS INCLUDES DENTAL WORK, COLONOSCOPY,ETC.         Please contact our office at [phone] for any questions.  NICHELLE Cherry            THE ABOVE INSTRUCTIONS WERE GIVEN TO THE PATIENT VERBALLY AND THEY VERBALIZED UNDERSTANDING.  THEY DO NOT REQUIRE ANY SPECIAL NEEDS AND DO NOT HAVE ANY LEARNING BARRIERS.          Directions for Reporting to Cardiology Waiting Area in the Hospital  If you park in the Parking Garage:  Take elevators to the1st floor of the parking garage.  Continue past the gift shop, coffee shop, and piano.  Take a right and go to the gold elevators. (Elevator B)  Take the elevator to the 3rd floor.  Follow the arrow on the sign on the wall that says Cath Lab Registration/EP Lab Registration.  Follow the long hallway all the way around until you come to a big open area.  This is the registration area.  Check in at Reception Desk.    OR    If family is dropping you off:  Have them drop you off at the front of the Hospital under the green overhang.  Enter through the doors and take a right.  Take the E elevators to the 3rd floor Cardiology Waiting Area.  Check in at the Reception Desk in the waiting room.

## 2025-01-27 ENCOUNTER — LAB VISIT (OUTPATIENT)
Dept: LAB | Facility: HOSPITAL | Age: 70
End: 2025-01-27
Attending: INTERNAL MEDICINE
Payer: MEDICARE

## 2025-01-27 DIAGNOSIS — C22.1 CHOLANGIOCARCINOMA: ICD-10-CM

## 2025-01-27 DIAGNOSIS — Z94.4 S/P LIVER TRANSPLANT: ICD-10-CM

## 2025-01-27 DIAGNOSIS — R97.8 OTHER ABNORMAL TUMOR MARKERS: ICD-10-CM

## 2025-01-27 DIAGNOSIS — I35.0 SEVERE AORTIC STENOSIS: Primary | ICD-10-CM

## 2025-01-27 LAB
ALBUMIN SERPL BCP-MCNC: 3.7 G/DL (ref 3.5–5.2)
ALP SERPL-CCNC: 100 U/L (ref 40–150)
ALT SERPL W/O P-5'-P-CCNC: 14 U/L (ref 10–44)
ANION GAP SERPL CALC-SCNC: 8 MMOL/L (ref 8–16)
AST SERPL-CCNC: 18 U/L (ref 10–40)
BASOPHILS # BLD AUTO: 0.07 K/UL (ref 0–0.2)
BASOPHILS NFR BLD: 0.9 % (ref 0–1.9)
BILIRUB SERPL-MCNC: 0.6 MG/DL (ref 0.1–1)
BUN SERPL-MCNC: 40 MG/DL (ref 8–23)
CALCIUM SERPL-MCNC: 9.2 MG/DL (ref 8.7–10.5)
CANCER AG19-9 SERPL-ACNC: <2.1 U/ML (ref 0–40)
CEA SERPL-MCNC: 1.7 NG/ML (ref 0–5)
CHLORIDE SERPL-SCNC: 106 MMOL/L (ref 95–110)
CO2 SERPL-SCNC: 27 MMOL/L (ref 23–29)
CREAT SERPL-MCNC: 1.5 MG/DL (ref 0.5–1.4)
DIFFERENTIAL METHOD BLD: ABNORMAL
EOSINOPHIL # BLD AUTO: 0.3 K/UL (ref 0–0.5)
EOSINOPHIL NFR BLD: 4.1 % (ref 0–8)
ERYTHROCYTE [DISTWIDTH] IN BLOOD BY AUTOMATED COUNT: 17.4 % (ref 11.5–14.5)
EST. GFR  (NO RACE VARIABLE): 49.5 ML/MIN/1.73 M^2
GLUCOSE SERPL-MCNC: 117 MG/DL (ref 70–110)
HCT VFR BLD AUTO: 42.9 % (ref 40–54)
HGB BLD-MCNC: 12.6 G/DL (ref 14–18)
IMM GRANULOCYTES # BLD AUTO: 0.03 K/UL (ref 0–0.04)
IMM GRANULOCYTES NFR BLD AUTO: 0.4 % (ref 0–0.5)
LYMPHOCYTES # BLD AUTO: 1.5 K/UL (ref 1–4.8)
LYMPHOCYTES NFR BLD: 20.3 % (ref 18–48)
MCH RBC QN AUTO: 26.4 PG (ref 27–31)
MCHC RBC AUTO-ENTMCNC: 29.4 G/DL (ref 32–36)
MCV RBC AUTO: 90 FL (ref 82–98)
MONOCYTES # BLD AUTO: 1 K/UL (ref 0.3–1)
MONOCYTES NFR BLD: 12.5 % (ref 4–15)
NEUTROPHILS # BLD AUTO: 4.7 K/UL (ref 1.8–7.7)
NEUTROPHILS NFR BLD: 61.8 % (ref 38–73)
NRBC BLD-RTO: 0 /100 WBC
PLATELET # BLD AUTO: 132 K/UL (ref 150–450)
PMV BLD AUTO: 12.5 FL (ref 9.2–12.9)
POTASSIUM SERPL-SCNC: 5.1 MMOL/L (ref 3.5–5.1)
PROT SERPL-MCNC: 7.1 G/DL (ref 6–8.4)
RBC # BLD AUTO: 4.77 M/UL (ref 4.6–6.2)
SODIUM SERPL-SCNC: 141 MMOL/L (ref 136–145)
WBC # BLD AUTO: 7.6 K/UL (ref 3.9–12.7)

## 2025-01-27 PROCEDURE — 36415 COLL VENOUS BLD VENIPUNCTURE: CPT | Mod: PN | Performed by: INTERNAL MEDICINE

## 2025-01-27 PROCEDURE — 80053 COMPREHEN METABOLIC PANEL: CPT | Performed by: INTERNAL MEDICINE

## 2025-01-27 PROCEDURE — 80197 ASSAY OF TACROLIMUS: CPT | Performed by: INTERNAL MEDICINE

## 2025-01-27 PROCEDURE — 85025 COMPLETE CBC W/AUTO DIFF WBC: CPT | Performed by: INTERNAL MEDICINE

## 2025-01-27 PROCEDURE — 86301 IMMUNOASSAY TUMOR CA 19-9: CPT | Performed by: INTERNAL MEDICINE

## 2025-01-27 PROCEDURE — 82378 CARCINOEMBRYONIC ANTIGEN: CPT | Performed by: INTERNAL MEDICINE

## 2025-01-27 RX ORDER — SODIUM CHLORIDE 0.9 % (FLUSH) 0.9 %
10 SYRINGE (ML) INJECTION
Status: SHIPPED | OUTPATIENT
Start: 2025-01-27

## 2025-01-27 RX ORDER — DIPHENHYDRAMINE HCL 50 MG
50 CAPSULE ORAL ONCE
OUTPATIENT
Start: 2025-01-27 | End: 2025-01-27

## 2025-01-27 RX ORDER — SODIUM CHLORIDE 9 MG/ML
INJECTION, SOLUTION INTRAVENOUS CONTINUOUS
OUTPATIENT
Start: 2025-01-27 | End: 2025-01-27

## 2025-01-28 ENCOUNTER — PATIENT OUTREACH (OUTPATIENT)
Dept: ADMINISTRATIVE | Facility: HOSPITAL | Age: 70
End: 2025-01-28

## 2025-01-28 LAB — TACROLIMUS BLD-MCNC: <2 NG/ML (ref 5–15)

## 2025-01-31 ENCOUNTER — TELEPHONE (OUTPATIENT)
Dept: TRANSPLANT | Facility: CLINIC | Age: 70
End: 2025-01-31
Payer: MEDICARE

## 2025-01-31 DIAGNOSIS — Z94.4 S/P LIVER TRANSPLANT: Primary | ICD-10-CM

## 2025-01-31 NOTE — TELEPHONE ENCOUNTER
Looks like when patient was admitted then changed him to CYA.  I will have him repeat labs next week.    ----- Message from Thais Maxwell MD sent at 1/31/2025  4:41 PM CST -----  Pt is now on csa??? When did that get changed?. Need to repeat with CSA level - please let patient know.

## 2025-02-04 ENCOUNTER — LAB VISIT (OUTPATIENT)
Dept: LAB | Facility: HOSPITAL | Age: 70
End: 2025-02-04
Attending: INTERNAL MEDICINE
Payer: MEDICARE

## 2025-02-04 DIAGNOSIS — Z94.4 S/P LIVER TRANSPLANT: ICD-10-CM

## 2025-02-04 LAB
ALBUMIN SERPL BCP-MCNC: 3.5 G/DL (ref 3.5–5.2)
ALP SERPL-CCNC: 82 U/L (ref 40–150)
ALT SERPL W/O P-5'-P-CCNC: 8 U/L (ref 10–44)
ANION GAP SERPL CALC-SCNC: 8 MMOL/L (ref 8–16)
AST SERPL-CCNC: 17 U/L (ref 10–40)
BASOPHILS # BLD AUTO: 0.05 K/UL (ref 0–0.2)
BASOPHILS NFR BLD: 0.9 % (ref 0–1.9)
BILIRUB SERPL-MCNC: 0.7 MG/DL (ref 0.1–1)
BUN SERPL-MCNC: 33 MG/DL (ref 8–23)
CALCIUM SERPL-MCNC: 9.3 MG/DL (ref 8.7–10.5)
CHLORIDE SERPL-SCNC: 106 MMOL/L (ref 95–110)
CO2 SERPL-SCNC: 27 MMOL/L (ref 23–29)
CREAT SERPL-MCNC: 1.5 MG/DL (ref 0.5–1.4)
DIFFERENTIAL METHOD BLD: ABNORMAL
EOSINOPHIL # BLD AUTO: 0.2 K/UL (ref 0–0.5)
EOSINOPHIL NFR BLD: 3.4 % (ref 0–8)
ERYTHROCYTE [DISTWIDTH] IN BLOOD BY AUTOMATED COUNT: 17.7 % (ref 11.5–14.5)
EST. GFR  (NO RACE VARIABLE): 49.5 ML/MIN/1.73 M^2
GLUCOSE SERPL-MCNC: 69 MG/DL (ref 70–110)
HCT VFR BLD AUTO: 38.2 % (ref 40–54)
HGB BLD-MCNC: 11.4 G/DL (ref 14–18)
IMM GRANULOCYTES # BLD AUTO: 0.01 K/UL (ref 0–0.04)
IMM GRANULOCYTES NFR BLD AUTO: 0.2 % (ref 0–0.5)
LYMPHOCYTES # BLD AUTO: 1.3 K/UL (ref 1–4.8)
LYMPHOCYTES NFR BLD: 22.9 % (ref 18–48)
MCH RBC QN AUTO: 26.6 PG (ref 27–31)
MCHC RBC AUTO-ENTMCNC: 29.8 G/DL (ref 32–36)
MCV RBC AUTO: 89 FL (ref 82–98)
MONOCYTES # BLD AUTO: 0.7 K/UL (ref 0.3–1)
MONOCYTES NFR BLD: 12.5 % (ref 4–15)
NEUTROPHILS # BLD AUTO: 3.3 K/UL (ref 1.8–7.7)
NEUTROPHILS NFR BLD: 60.1 % (ref 38–73)
NRBC BLD-RTO: 0 /100 WBC
PLATELET # BLD AUTO: 93 K/UL (ref 150–450)
PMV BLD AUTO: 12.8 FL (ref 9.2–12.9)
POTASSIUM SERPL-SCNC: 4.4 MMOL/L (ref 3.5–5.1)
PROT SERPL-MCNC: 6.7 G/DL (ref 6–8.4)
RBC # BLD AUTO: 4.29 M/UL (ref 4.6–6.2)
SODIUM SERPL-SCNC: 141 MMOL/L (ref 136–145)
WBC # BLD AUTO: 5.54 K/UL (ref 3.9–12.7)

## 2025-02-04 PROCEDURE — 36415 COLL VENOUS BLD VENIPUNCTURE: CPT | Mod: PN | Performed by: INTERNAL MEDICINE

## 2025-02-04 PROCEDURE — 85025 COMPLETE CBC W/AUTO DIFF WBC: CPT | Performed by: INTERNAL MEDICINE

## 2025-02-04 PROCEDURE — 80158 DRUG ASSAY CYCLOSPORINE: CPT | Performed by: INTERNAL MEDICINE

## 2025-02-04 PROCEDURE — 80053 COMPREHEN METABOLIC PANEL: CPT | Performed by: INTERNAL MEDICINE

## 2025-02-05 LAB — CYCLOSPORINE BLD LC/MS/MS-MCNC: 133 NG/ML (ref 100–400)

## 2025-02-06 DIAGNOSIS — Z94.4 S/P LIVER TRANSPLANT: Primary | ICD-10-CM

## 2025-02-06 RX ORDER — MYCOPHENOLATE MOFETIL 250 MG/1
500 CAPSULE ORAL 2 TIMES DAILY
Qty: 120 CAPSULE | Refills: 11 | Status: SHIPPED | OUTPATIENT
Start: 2025-02-06 | End: 2026-02-06

## 2025-02-06 RX ORDER — CYCLOSPORINE 25 MG/1
CAPSULE, LIQUID FILLED ORAL
Qty: 90 CAPSULE | Refills: 11 | Status: SHIPPED | OUTPATIENT
Start: 2025-02-06 | End: 2026-02-06

## 2025-02-06 NOTE — TELEPHONE ENCOUNTER
Sent message to let patient know about adding Cellcept, changing Cyclosporine dose and getting labs next week.    ----- Message from Thais Maxwell MD sent at 2/5/2025  5:12 PM CST -----  CSA level given elevated creatinine- target  and add cellcept 500 mg twice daily; lower CSA to 50/25 and repeat labs in one week - please let patient know.

## 2025-02-07 ENCOUNTER — PATIENT MESSAGE (OUTPATIENT)
Dept: CARDIOTHORACIC SURGERY | Facility: CLINIC | Age: 70
End: 2025-02-07
Payer: MEDICARE

## 2025-02-07 ENCOUNTER — TELEPHONE (OUTPATIENT)
Dept: CARDIOTHORACIC SURGERY | Facility: CLINIC | Age: 70
End: 2025-02-07
Payer: MEDICARE

## 2025-02-07 NOTE — TELEPHONE ENCOUNTER
Attempted to call pt to confirm 2/10 appt with Dr. Craig, pt answered, but could not hear me and hung up. Sent [employer] message reminding pt of appt.

## 2025-02-10 ENCOUNTER — EDUCATION (OUTPATIENT)
Dept: CARDIOLOGY | Facility: CLINIC | Age: 70
End: 2025-02-10
Payer: MEDICARE

## 2025-02-10 ENCOUNTER — OFFICE VISIT (OUTPATIENT)
Dept: CARDIOTHORACIC SURGERY | Facility: CLINIC | Age: 70
End: 2025-02-10
Payer: MEDICARE

## 2025-02-10 VITALS
HEIGHT: 72 IN | WEIGHT: 212.94 LBS | SYSTOLIC BLOOD PRESSURE: 134 MMHG | HEART RATE: 56 BPM | OXYGEN SATURATION: 95 % | DIASTOLIC BLOOD PRESSURE: 61 MMHG | BODY MASS INDEX: 28.84 KG/M2 | TEMPERATURE: 98 F

## 2025-02-10 DIAGNOSIS — I35.0 SEVERE AORTIC STENOSIS: Primary | ICD-10-CM

## 2025-02-10 DIAGNOSIS — I35.0 SEVERE AORTIC STENOSIS: ICD-10-CM

## 2025-02-10 PROCEDURE — 99205 OFFICE O/P NEW HI 60 MIN: CPT | Mod: S$GLB,,, | Performed by: THORACIC SURGERY (CARDIOTHORACIC VASCULAR SURGERY)

## 2025-02-10 PROCEDURE — 1111F DSCHRG MED/CURRENT MED MERGE: CPT | Mod: CPTII,S$GLB,, | Performed by: THORACIC SURGERY (CARDIOTHORACIC VASCULAR SURGERY)

## 2025-02-10 PROCEDURE — 1125F AMNT PAIN NOTED PAIN PRSNT: CPT | Mod: CPTII,S$GLB,, | Performed by: THORACIC SURGERY (CARDIOTHORACIC VASCULAR SURGERY)

## 2025-02-10 PROCEDURE — 99999 PR PBB SHADOW E&M-EST. PATIENT-LVL V: CPT | Mod: PBBFAC,,, | Performed by: THORACIC SURGERY (CARDIOTHORACIC VASCULAR SURGERY)

## 2025-02-10 PROCEDURE — 1160F RVW MEDS BY RX/DR IN RCRD: CPT | Mod: CPTII,S$GLB,, | Performed by: THORACIC SURGERY (CARDIOTHORACIC VASCULAR SURGERY)

## 2025-02-10 PROCEDURE — 3008F BODY MASS INDEX DOCD: CPT | Mod: CPTII,S$GLB,, | Performed by: THORACIC SURGERY (CARDIOTHORACIC VASCULAR SURGERY)

## 2025-02-10 PROCEDURE — 3075F SYST BP GE 130 - 139MM HG: CPT | Mod: CPTII,S$GLB,, | Performed by: THORACIC SURGERY (CARDIOTHORACIC VASCULAR SURGERY)

## 2025-02-10 PROCEDURE — 1159F MED LIST DOCD IN RCRD: CPT | Mod: CPTII,S$GLB,, | Performed by: THORACIC SURGERY (CARDIOTHORACIC VASCULAR SURGERY)

## 2025-02-10 PROCEDURE — 3078F DIAST BP <80 MM HG: CPT | Mod: CPTII,S$GLB,, | Performed by: THORACIC SURGERY (CARDIOTHORACIC VASCULAR SURGERY)

## 2025-02-10 PROCEDURE — 3044F HG A1C LEVEL LT 7.0%: CPT | Mod: CPTII,S$GLB,, | Performed by: THORACIC SURGERY (CARDIOTHORACIC VASCULAR SURGERY)

## 2025-02-10 RX ORDER — BUPROPION HYDROCHLORIDE 75 MG/1
75 TABLET ORAL DAILY
COMMUNITY

## 2025-02-10 NOTE — PROGRESS NOTES
TAVR DISCHARGE INSTRUCTIONS      1. General Post Op Instructions:  -   No lifting greater than 5 pounds for 5 days  -   No driving or operating heavy machinery for 5 days  -   You may shower as soon as you get home but no bathing or submerging in water       (lakes, pools, tub etc) for 1 week.                -   You may remove the dressing and replace with a band-aid.      -   Keep incision dry and clean.  No lotions, oils, or creams.  You may change the band-aid daily                   until a scab has formed over              -   You may feel a small lump in your groin area due to a closure device used after the procedure.                     The site may be black and blue or swollen and pink for a few days. If the site enlarges,                    becomes painful and/or starts to bleed,please call.               -    You may return to your regular activities as tolerated.        If you develop any fever, urinary tract infection, or any other signs of infection, please call our office immediately.    2.  Preventing infection on Your heart Valve:  -   DO NOT have any dental work, surgery, or any other elective procedures for 6 months.  -   Provide a copy of this card to your Dentist or Gastroenterologist to keep in your chart.  -  You DO need to take antibiotics prior to any routine dental cleaning, GI procedure (colonoscopy, endoscopy), (cystoscopy), or respiratory procedures (bronchoscopy).                -  You DO need antibiotics if you are having a procedure that requires cutting any infected area.              -  Your Dentist or Gastroenterologist will prescribe these for you prior to your appointment.        3.  Managing your heart Failure:  -    Weigh yourself daily and keep track of your weight  -    If you are on Lasix, continue to take it as prescribed.  -    If you gain 3 pounds overnight or 5 pounds over 5 days, double Lasix for 3 days or begin taking  "Lasix once a day for 3 days                   4.  Follow Up:  -   TAVR follow up protocol requires you to return for a one month and a one-year visit. We will schedule an echo, blood work  and an appointment to see a    member of our team.      -   Please continue to see your regular Cardiologist for all other issues.  -   You will receive an ID card from the  of your valve in 4-6 weeks.  Make a copy of the card, and keep it with you at all times.    5.  Discharge:  - If you have any questions or concerns after discharge, please do not hesitate to call our office and speak with a nurse at [phone]              - If you have any problems or concerns related to a Pacemaker or Event monitor please call the Arrhythmia department at [phone].             -  Please address any other concerns with your primary Cardiologist.                   -  We answer messages sent via the "Commissioner" portal Monday - Friday 8am - 5pm.               -  Please do not send emergency messages through the portal.                  After hours and weekends, please call [phone] to speak to a Registered Nurse.     "

## 2025-02-10 NOTE — PROGRESS NOTES
Subjective:      Patient ID: Tej Purdy is a 69 y.o. male.    Chief Complaint: No chief complaint on file.    HPI:  Tej Purdy is a 69 y.o. male who presents as an initial consult for severe aortic stenosis and a ZACK evaluation.  He has a medical history significant for HTN, HLD, T2DM on insulin, paroxysmal Afib on eliquis, PAD, hx HCC s/p liver transplant 1/2022 and RFA/TACE 12/20, HFrEF now recovered to 60-65%, severe AS s/p BAV 2/1/24.  He is referred by Dr. Nugent as apart of his ZACK evaluation.  He underwent a cardiac evaluation which revealed his ejection fraction of 60%,CHRIS 0.7, MG 46.      The patient has undergone the following TAVR work-up:   ECHO (Date 10.14.24): CHRIS= 0.7 cm2, MG= 46mmHg, Peak Chandra= 4.7 m/s, EF= 55%.   LHC (Date 12.23): S/P PCI to [facility]  STS: 2%   Frailty: 2/4   Iliacs are >7    LVOT area by CTA is 5.6 cm2 (30 mm X 25 mm) and Avg Diameter is 26.7 per my independent review of images on Fiz.  Incidental findings on CT: Non contributory  CT Surgery risk assessment:  Pending  Rhythm issues: Afib  KCCQ/5 meter walk: Done  Comorbidities: Cirrhosis, CAD    Family and social history reviewed    Review of patient's allergies indicates:   Allergen Reactions    Bee pollens Swelling     BEE STINGS swells body     Past Medical History:   Diagnosis Date    Acute appendicitis 06/02/2023    Aortic stenosis     Atrial fibrillation     johny/cardioversion on 1/26/24    Calculus of ureter 12/22/2023    CHF (congestive heart failure)     Cholangiocarcinoma 03/16/2022    s/p liver transplant    Cirrhosis 11/23/2020    s/p transplant 2022 for CASTILLO cirrhosis and cholangiocarcinoma    Diabetes mellitus, type 2     c/b Diabetic neuropathy    HTN (hypertension) 11/23/2020    Hyperlipidemia 11/23/2020    Kidney stones 11/23/2020    S/p lithotripsy 2020    PAD (peripheral artery disease)     Skin cancer 11/23/2020     Past Surgical History:   Procedure Laterality Date    ANGIOGRAM, CORONARY,  WITH LEFT HEART CATHETERIZATION N/A 12/26/2023    Procedure: Angiogram, Coronary, with Left Heart Cath;  Surgeon: Carlos King MD;  Location: [facility] CATH LAB;  Service: Cardiology;  Laterality: N/A;    AORTIC VALVULOPLASTY N/A 2/1/2024    Procedure: REPAIR, AORTIC VALVE;  Surgeon: Shilo Carrasco MD;  Location: [facility] CATH LAB;  Service: Cardiology;  Laterality: N/A;    COLONOSCOPY  10/2020    ECHOCARDIOGRAM,TRANSESOPHAGEAL N/A 1/26/2024    Procedure: Transesophageal echo (ASHISH) intra-procedure log documentation;  Surgeon: Nick Mathur III, MD;  Location: [facility] EP LAB;  Service: Cardiology;  Laterality: N/A;    ESOPHAGEAL MANOMETRY WITH MEASUREMENT OF IMPEDANCE N/A 7/17/2023    Procedure: MANOMETRY, ESOPHAGUS, WITH IMPEDANCE MEASUREMENT;  Surgeon: Klarissa Zamora MD;  Location: [facility] ENDO (4TH FLR);  Service: Gastroenterology;  Laterality: N/A;  pt diabetic  liver txp  prep insructions sent to pt via email and portal -Jm    ESOPHAGOGASTRODUODENOSCOPY  10/2020    ESOPHAGOGASTRODUODENOSCOPY N/A 7/1/2021    Procedure: EGD (ESOPHAGOGASTRODUODENOSCOPY);  Surgeon: Jovan David MD;  Location: [facility] ENDO (4TH FLR);  Service: Endoscopy;  Laterality: N/A;  HCC. Listed for liver transplant. EGD for variceal surveillance.  cardiac clearance and blood thinenr approval received, see telephone encounter 6/23/21-BB  fully vaccinated-BB  labs same day of procedure-BB    EXCISION OF HYDROCELE Right     hemorroid surgery      hemorroidectomy      KIDNEY STONE SURGERY      LAPAROSCOPIC APPENDECTOMY N/A 6/2/2023    Procedure: APPENDECTOMY, LAPAROSCOPIC;  Surgeon: Shan Ross MD;  Location: [facility] OR 2ND FLR;  Service: General;  Laterality: N/A;    LEFT HEART CATHETERIZATION N/A 1/27/2021    Procedure: Left heart cath;  Surgeon: Kris Shelton MD;  Location: [facility] CATH LAB;  Service: Cardiology;  Laterality: N/A;    liver mass removal      LIVER TRANSPLANT N/A 1/6/2022    Procedure: TRANSPLANT, LIVER;  Surgeon:  Lizet Garcia MD;  Location: [facility] OR [facility] FLR;  Service: Transplant;  Laterality: N/A;    MAGNETIC RESONANCE IMAGING N/A 1/13/2025    Procedure: MRI (Magnetic Resonance Imagine);  Surgeon: Gaby Cuevas;  Location: [facility] GABY;  Service: Anesthesiology;  Laterality: N/A;  minimal sedation prior to MRI    RIGHT HEART CATHETERIZATION Right 5/7/2021    Procedure: INSERTION, CATHETER, RIGHT HEART;  Surgeon: Jaden Schuster MD;  Location: [facility] CATH LAB;  Service: Cardiology;  Laterality: Right;    STENT, DRUG ELUTING, SINGLE VESSEL, CORONARY  12/26/2023    Procedure: Stent, Drug Eluting, Single Vessel, Coronary;  Surgeon: Carlos King MD;  Location: [facility] CATH LAB;  Service: Cardiology;;    TREATMENT OF CARDIAC ARRHYTHMIA N/A 5/19/2021    Procedure: CARDIOVERSION;  Surgeon: Didier Tilley MD;  Location: [facility] EP LAB;  Service: Cardiology;  Laterality: N/A;  a fib, dccv/johny, mac, dm. sscu    TREATMENT OF CARDIAC ARRHYTHMIA N/A 5/31/2021    Procedure: CARDIOVERSION;  Surgeon: Daniel Arambula MD;  Location: [facility] EP LAB;  Service: Cardiology;  Laterality: N/A;  afib, DCCV, anest., DM, 3 prep    TREATMENT OF CARDIAC ARRHYTHMIA N/A 7/7/2021    Procedure: Cardioversion or Defibrillation;  Surgeon: Didier Tilley MD;  Location: [facility] EP LAB;  Service: Cardiology;  Laterality: N/A;  AF, JOHNY (cancel if complaint), DCCV, MAC, DM, 3 Prep    TREATMENT OF CARDIAC ARRHYTHMIA N/A 7/27/2021    Procedure: Cardioversion or Defibrillation;  Surgeon: Didier Tilley MD;  Location: [facility] EP LAB;  Service: Cardiology;  Laterality: N/A;  AF, JOHNY (cx if complaint), DCCV, MAC, DM, 3 Prep    TREATMENT OF CARDIAC ARRHYTHMIA N/A 1/26/2024    Procedure: Cardioversion or Defibrillation;  Surgeon: Koko Knight MD;  Location: [facility] EP LAB;  Service: Cardiology;  Laterality: N/A;  AF, JOHNY, DCCV, MAC, DM, 3 Prep *ADENOSINE TEST* *LIVER TRANSPLANT PATIENT*     Family History       Problem Relation (Age of Onset)    Coronary artery disease Father           Social History     Socioeconomic History    Marital status:    Tobacco Use    Smoking status: Former     Current packs/day: 0.00     Types: Cigarettes     Quit date: 1980     Years since quittin.0    Smokeless tobacco: Former   Substance and Sexual Activity    Alcohol use: Not Currently    Drug use: Never     Social Drivers of Health     Financial Resource Strain: Patient Declined (2025)    Overall Financial Resource Strain (CARDIA)     Difficulty of Paying Living Expenses: Patient declined   Food Insecurity: Patient Declined (2025)    Hunger Vital Sign     Worried About Running Out of Food in the Last Year: Patient declined     Ran Out of Food in the Last Year: Patient declined   Transportation Needs: Patient Declined (2025)    TRANSPORTATION NEEDS     Transportation : Patient declined   Physical Activity: Unknown (2025)    Exercise Vital Sign     Days of Exercise per Week: 0 days   Stress: No Stress Concern Present (2025)    [employer] - Occupational Stress Questionnaire     Feeling of Stress : Not at all   Housing Stability: Unknown (2025)    Housing Stability Vital Sign     Unable to Pay for Housing in the Last Year: No     Homeless in the Last Year: Patient declined       Current Outpatient Medications:     apixaban (ELIQUIS) 5 mg Tab, Take 1 tablet (5 mg total) by mouth 2 (two) times daily., Disp: 180 tablet, Rfl: 3    atorvastatin (LIPITOR) 80 MG tablet, Take 1 tablet (80 mg total) by mouth once daily., Disp: 90 tablet, Rfl: 3    blood sugar diagnostic Strp, To check BG 2 times daily, to use with insurance preferred meter (patient has a [employer] self-monitoring glucose meter), Disp: 100 strip, Rfl: 11    blood-glucose meter kit, To check BG 2 times daily, to use with insurance preferred meter, Disp: 1 each, Rfl: 0    carvediloL (COREG) 12.5 MG tablet, Take 1 tablet (12.5 mg total) by mouth 2 (two) times daily with meals.,  Disp: 180 tablet, Rfl: 3    cycloSPORINE modified, NEORAL, (NEORAL) 25 MG capsule, Take 2 capsules (50 mg total) by mouth every morning AND 1 capsule (25 mg total) every evening., Disp: 90 capsule, Rfl: 11    diazePAM (VALIUM) 5 MG tablet, Take 1 tablet (5 mg total) by mouth as needed for Anxiety. Take 1 tab 30 min prior to MRI, take 2nd as needed, Disp: 2 tablet, Rfl: 0    empagliflozin (JARDIANCE) 10 mg tablet, Take 1 tablet (10 mg total) by mouth once daily., Disp: 90 tablet, Rfl: 3    ferrous sulfate (IRON) 325 mg (65 mg iron) Tab tablet, Take 1 tablet (325 mg total) by mouth once daily., Disp: 30 tablet, Rfl: 2    furosemide (LASIX) 20 MG tablet, Take 1 tablet (20 mg total) by mouth once daily. (Patient not taking: Reported on 1/25/2025), Disp: 90 tablet, Rfl: 3    HYDROcodone-acetaminophen (NORCO) 5-325 mg per tablet, Take 1 tablet by mouth every 8 (eight) hours as needed for Pain., Disp: 12 tablet, Rfl: 0    insulin glargine U-100, Lantus, (LANTUS SOLOSTAR U-100 INSULIN) 100 unit/mL (3 mL) InPn pen, Inject 22 Units into the skin every evening., Disp: 15 mL, Rfl: 11    insulin lispro (HUMALOG KWIKPEN INSULIN) 100 unit/mL pen, Inject 6 Units into the skin 3 (three) times daily with meals. Plus sliding scale, MDD: 48 units, Disp: 15 mL, Rfl: 5    lancets [facility], To check BG 2 times daily, to use with insurance preferred meter, Disp: 100 each, Rfl: 11    levETIRAcetam (KEPPRA) 500 MG Tab, Take 1 tablet (500 mg total) by mouth 2 (two) times daily., Disp: 60 tablet, Rfl: 11    magnesium oxide (MAG-OX) 400 mg (241.3 mg magnesium) tablet, Take 1 tablet (400 mg total) by mouth 2 (two) times daily., Disp: 60 tablet, Rfl: 11    multivitamin capsule, Take 1 capsule by mouth once daily., Disp: , Rfl:     mycophenolate (CELLCEPT) 250 mg Cap, Take 2 capsules (500 mg total) by mouth 2 (two) times daily., Disp: 120 capsule, Rfl: 11    omega-3 fatty acids/fish oil (FISH OIL-OMEGA-3 FATTY ACIDS) 300-1,000 mg capsule, Take 1  "capsule by mouth once daily. , Disp: , Rfl:     pantoprazole (PROTONIX) 40 MG tablet, Take 1 tablet (40 mg total) by mouth once daily., Disp: 90 tablet, Rfl: 1    pen needle, diabetic (BD ULTRA-FINE GÉNESIS PEN NEEDLE) 32 gauge x 5/32" Ndle, Use to inject insulin 4 times daily, Disp: 200 each, Rfl: 11    pregabalin (LYRICA) 75 MG capsule, Take 1 capsule (75 mg total) by mouth 2 (two) times daily., Disp: 60 capsule, Rfl: 0    QUEtiapine (SEROQUEL) 100 MG Tab, Take 1 tablet (100 mg total) by mouth every evening., Disp: 90 tablet, Rfl: 1    sacubitriL-valsartan (ENTRESTO) 49-51 mg per tablet, Take 1 tablet by mouth 2 (two) times daily., Disp: 180 tablet, Rfl: 3    Current Facility-Administered Medications:     sodium chloride 0.9% flush 10 mL, 10 mL, Intravenous, PRN, Shilo Carrasco MD  Current medications Reviewed    Review of Systems   Constitutional:  Negative for activity change, appetite change, fatigue and fever.   HENT:  Negative for nosebleeds.    Respiratory:  Negative for cough and shortness of breath.    Cardiovascular:  Negative for chest pain, palpitations and leg swelling.   Gastrointestinal:  Negative for abdominal distention, abdominal pain and nausea.   Genitourinary:  Negative for frequency.   Musculoskeletal:  Negative for arthralgias and myalgias.   Skin:  Negative for rash.   Neurological:  Negative for dizziness and numbness.   Hematological:  Does not bruise/bleed easily.     Objective:   Physical Exam  HENT:      Head: Normocephalic and atraumatic.   Eyes:      Extraocular Movements: Extraocular movements intact.   Cardiovascular:      Rate and Rhythm: Normal rate and regular rhythm.   Pulmonary:      Effort: Pulmonary effort is normal.   Abdominal:      General: Abdomen is flat.      Palpations: Abdomen is soft.   Musculoskeletal:         General: Normal range of motion.      Cervical back: Normal range of motion.   Skin:     General: Skin is warm and dry.      Capillary Refill: Capillary " refill takes less than 2 seconds.   Neurological:      General: No focal deficit present.         Diagnostic Results: Reviewed  CT ZACK: 12/16/2024  Impression:  CT obtained for the purposes of preprocedural planning.  TAVR measurements above.  Advanced calcific atherosclerosis of the abdominopelvic vasculature and coronary arteries.  Status post liver transplant.  Additional findings as above.    ECHO: 10/14/2024    Left Ventricle: The left ventricle is normal in size. Normal wall thickness. There is normal systolic function with a visually estimated ejection fraction of 60 - 65%.    Right Ventricle: Normal right ventricular cavity size. Wall thickness is normal. Systolic function is normal.    Left Atrium: Left atrium is mildly dilated.    Aortic Valve: There is severe aortic valve sclerosis. Severely restricted motion. There is severe stenosis. Aortic valve area by VTI is 0.7 cm2. Aortic valve peak velocity is 4.6 m/s. Mean gradient is 47.3 mmHg. The dimensionless index is 0.20. There is mild aortic regurgitation.    Mitral Valve: There is mild regurgitation.    Pulmonary Artery: No pulmonary hypertension. The estimated pulmonary artery systolic pressure is 30 mmHg.    IVC/SVC: Normal venous pressure at 3 mmHg.  Assessment:   Severe Aortic Stenosis  Plan:   Due o multiple comorbid conditions, patient is a high risk for SAVR. Continue with ZACK Evaluation.

## 2025-02-12 ENCOUNTER — LAB VISIT (OUTPATIENT)
Dept: LAB | Facility: HOSPITAL | Age: 70
End: 2025-02-12
Attending: INTERNAL MEDICINE
Payer: MEDICARE

## 2025-02-12 DIAGNOSIS — Z94.4 S/P LIVER TRANSPLANT: ICD-10-CM

## 2025-02-12 LAB
ALBUMIN SERPL BCP-MCNC: 3.6 G/DL (ref 3.5–5.2)
ALP SERPL-CCNC: 80 U/L (ref 40–150)
ALT SERPL W/O P-5'-P-CCNC: 10 U/L (ref 10–44)
ANION GAP SERPL CALC-SCNC: 11 MMOL/L (ref 8–16)
AST SERPL-CCNC: 17 U/L (ref 10–40)
BASOPHILS # BLD AUTO: 0.06 K/UL (ref 0–0.2)
BASOPHILS NFR BLD: 0.9 % (ref 0–1.9)
BILIRUB SERPL-MCNC: 0.9 MG/DL (ref 0.1–1)
BUN SERPL-MCNC: 36 MG/DL (ref 8–23)
CALCIUM SERPL-MCNC: 8.7 MG/DL (ref 8.7–10.5)
CHLORIDE SERPL-SCNC: 106 MMOL/L (ref 95–110)
CO2 SERPL-SCNC: 25 MMOL/L (ref 23–29)
CREAT SERPL-MCNC: 1.7 MG/DL (ref 0.5–1.4)
DIFFERENTIAL METHOD BLD: ABNORMAL
EOSINOPHIL # BLD AUTO: 0.2 K/UL (ref 0–0.5)
EOSINOPHIL NFR BLD: 3.5 % (ref 0–8)
ERYTHROCYTE [DISTWIDTH] IN BLOOD BY AUTOMATED COUNT: 18.1 % (ref 11.5–14.5)
EST. GFR  (NO RACE VARIABLE): 42.6 ML/MIN/1.73 M^2
GLUCOSE SERPL-MCNC: 89 MG/DL (ref 70–110)
HCT VFR BLD AUTO: 42.2 % (ref 40–54)
HGB BLD-MCNC: 12.7 G/DL (ref 14–18)
IMM GRANULOCYTES # BLD AUTO: 0.03 K/UL (ref 0–0.04)
IMM GRANULOCYTES NFR BLD AUTO: 0.4 % (ref 0–0.5)
LYMPHOCYTES # BLD AUTO: 1.5 K/UL (ref 1–4.8)
LYMPHOCYTES NFR BLD: 21.3 % (ref 18–48)
MCH RBC QN AUTO: 26.1 PG (ref 27–31)
MCHC RBC AUTO-ENTMCNC: 30.1 G/DL (ref 32–36)
MCV RBC AUTO: 87 FL (ref 82–98)
MONOCYTES # BLD AUTO: 1 K/UL (ref 0.3–1)
MONOCYTES NFR BLD: 14.5 % (ref 4–15)
NEUTROPHILS # BLD AUTO: 4.1 K/UL (ref 1.8–7.7)
NEUTROPHILS NFR BLD: 59.4 % (ref 38–73)
NRBC BLD-RTO: 0 /100 WBC
PLATELET # BLD AUTO: 119 K/UL (ref 150–450)
PMV BLD AUTO: 12.4 FL (ref 9.2–12.9)
POTASSIUM SERPL-SCNC: 4.5 MMOL/L (ref 3.5–5.1)
PROT SERPL-MCNC: 6.7 G/DL (ref 6–8.4)
RBC # BLD AUTO: 4.86 M/UL (ref 4.6–6.2)
SODIUM SERPL-SCNC: 142 MMOL/L (ref 136–145)
WBC # BLD AUTO: 6.84 K/UL (ref 3.9–12.7)

## 2025-02-12 PROCEDURE — 80053 COMPREHEN METABOLIC PANEL: CPT | Performed by: INTERNAL MEDICINE

## 2025-02-12 PROCEDURE — 36415 COLL VENOUS BLD VENIPUNCTURE: CPT | Mod: PN | Performed by: INTERNAL MEDICINE

## 2025-02-12 PROCEDURE — 80158 DRUG ASSAY CYCLOSPORINE: CPT | Performed by: INTERNAL MEDICINE

## 2025-02-12 PROCEDURE — 85025 COMPLETE CBC W/AUTO DIFF WBC: CPT | Performed by: INTERNAL MEDICINE

## 2025-02-13 LAB — CYCLOSPORINE BLD LC/MS/MS-MCNC: 51 NG/ML (ref 100–400)

## 2025-02-16 ENCOUNTER — RESULTS FOLLOW-UP (OUTPATIENT)
Dept: TRANSPLANT | Facility: CLINIC | Age: 70
End: 2025-02-16
Payer: MEDICARE

## 2025-02-17 ENCOUNTER — ANESTHESIA EVENT (OUTPATIENT)
Dept: MEDSURG UNIT | Facility: HOSPITAL | Age: 70
DRG: 267 | End: 2025-02-17
Payer: MEDICARE

## 2025-02-17 NOTE — TELEPHONE ENCOUNTER
Sent patient message via portal to let benedict know labs are stable.  No medication changes, next labs due on 5/05/25    ----- Message from Thais Maxwell MD sent at 2/16/2025 10:12 PM CST -----  The Labs are stable - please let patient know.  ----- Message -----  From: Joey, [facility] Lab Interface  Sent: 2/12/2025   2:53 PM CST  To: Thais Maxwell MD

## 2025-02-17 NOTE — ANESTHESIA PREPROCEDURE EVALUATION
[facility]  Anesthesia Pre-Operative Evaluation         Patient Name: Tej Purdy  YOB: 1955  MRN: [id number]    SUBJECTIVE:     Pre-operative evaluation for Procedure(s) (LRB):  REPLACEMENT, AORTIC VALVE, TRANSCATHETER (TAVR) (N/A)     02/17/2025    Tej Purdy is a 69 y.o. male w/ a significant PMHx of HTN, HLD, T2DM on insulin, paroxysmal Afib on eliquis, PAD, hx HCC s/p liver transplant 1/2022 and RFA/TACE 12/20, HFrEF now recovered to 60-65%, severe AS s/p BAV 2/1/24 who presents for the above procedure(s).    TTE: 10/14/24   Left Ventricle: The left ventricle is normal in size. Normal wall thickness. There is normal systolic function with a visually estimated ejection fraction of 60 - 65%.    Right Ventricle: Normal right ventricular cavity size. Wall thickness is normal. Systolic function is normal.    Left Atrium: Left atrium is mildly dilated.    Aortic Valve: There is severe aortic valve sclerosis. Severely restricted motion. There is severe stenosis. Aortic valve area by VTI is 0.7 cm2. Aortic valve peak velocity is 4.6 m/s. Mean gradient is 47.3 mmHg. The dimensionless index is 0.20. There is mild aortic regurgitation.    Mitral Valve: There is mild regurgitation.    Pulmonary Artery: No pulmonary hypertension. The estimated pulmonary artery systolic pressure is 30 mmHg.    IVC/SVC: Normal venous pressure at 3 mmHg.    LDA:  None documented     Prev airway: 6/2/23  Intubation:     Induction:  Intravenous    Intubated:  Postinduction    Mask Ventilation:  Not attempted    Attempts:  1    Attempted By:  CRNA    Method of Intubation:  Video laryngoscopy    Blade:  Mireles 3    Laryngeal View Grade: Grade I - full view of cords      Difficult Airway Encountered?: No      Complications:  None    Airway Device:  Oral endotracheal tube    Airway Device Size:  7.5    Style/Cuff Inflation:  Cuffed    Inflation Amount (mL):  8    Tube secured:  23    Secured at:   The lips    Placement Verified By:  Capnometry    Complicating Factors:  None    Findings Post-Intubation:  BS equal bilateral and atraumatic/condition of teeth unchanged    Drips: None documented.    Problem List[1]    Review of patient's allergies indicates:   Allergen Reactions    Bee pollens Swelling     BEE STINGS swells body       Current Outpatient Medications:  Current Medications[2]    Past Surgical History:   Procedure Laterality Date    ANGIOGRAM, CORONARY, WITH LEFT HEART CATHETERIZATION N/A 12/26/2023    Procedure: Angiogram, Coronary, with Left Heart Cath;  Surgeon: Carlos King MD;  Location: [facility] CATH LAB;  Service: Cardiology;  Laterality: N/A;    AORTIC VALVULOPLASTY N/A 2/1/2024    Procedure: REPAIR, AORTIC VALVE;  Surgeon: Shilo Carrasco MD;  Location: [facility] CATH LAB;  Service: Cardiology;  Laterality: N/A;    COLONOSCOPY  10/2020    ECHOCARDIOGRAM,TRANSESOPHAGEAL N/A 1/26/2024    Procedure: Transesophageal echo (ASHISH) intra-procedure log documentation;  Surgeon: Nick Mathur III, MD;  Location: [facility] EP LAB;  Service: Cardiology;  Laterality: N/A;    ESOPHAGEAL MANOMETRY WITH MEASUREMENT OF IMPEDANCE N/A 7/17/2023    Procedure: MANOMETRY, ESOPHAGUS, WITH IMPEDANCE MEASUREMENT;  Surgeon: Klarissa Zamora MD;  Location: [facility] ENDO (4TH FLR);  Service: Gastroenterology;  Laterality: N/A;  pt diabetic  liver txp  prep insructions sent to pt via email and portal -    ESOPHAGOGASTRODUODENOSCOPY  10/2020    ESOPHAGOGASTRODUODENOSCOPY N/A 7/1/2021    Procedure: EGD (ESOPHAGOGASTRODUODENOSCOPY);  Surgeon: Jovan David MD;  Location: [facility] ENDO (4TH FLR);  Service: Endoscopy;  Laterality: N/A;  HCC. Listed for liver transplant. EGD for variceal surveillance.  cardiac clearance and blood thinenr approval received, see telephone encounter 6/23/21-BB  fully vaccinated-BB  labs same day of procedure-BB    EXCISION OF HYDROCELE Right     hemorroid surgery      hemorroidectomy      KIDNEY  STONE SURGERY      LAPAROSCOPIC APPENDECTOMY N/A 6/2/2023    Procedure: APPENDECTOMY, LAPAROSCOPIC;  Surgeon: Shan Ross MD;  Location: [facility] OR [facility] FLR;  Service: General;  Laterality: N/A;    LEFT HEART CATHETERIZATION N/A 1/27/2021    Procedure: Left heart cath;  Surgeon: Kris Shelton MD;  Location: [facility] CATH LAB;  Service: Cardiology;  Laterality: N/A;    liver mass removal      LIVER TRANSPLANT N/A 1/6/2022    Procedure: TRANSPLANT, LIVER;  Surgeon: Lizet Garcia MD;  Location: [facility] OR 2ND FLR;  Service: Transplant;  Laterality: N/A;    MAGNETIC RESONANCE IMAGING N/A 1/13/2025    Procedure: MRI (Magnetic Resonance Imagine);  Surgeon: Gaby Cuevas;  Location: [facility] GABY;  Service: Anesthesiology;  Laterality: N/A;  minimal sedation prior to MRI    RIGHT HEART CATHETERIZATION Right 5/7/2021    Procedure: INSERTION, CATHETER, RIGHT HEART;  Surgeon: Jaden Schuster MD;  Location: [facility] CATH LAB;  Service: Cardiology;  Laterality: Right;    STENT, DRUG ELUTING, SINGLE VESSEL, CORONARY  12/26/2023    Procedure: Stent, Drug Eluting, Single Vessel, Coronary;  Surgeon: Carlos King MD;  Location: [facility] CATH LAB;  Service: Cardiology;;    TREATMENT OF CARDIAC ARRHYTHMIA N/A 5/19/2021    Procedure: CARDIOVERSION;  Surgeon: Didier Tilley MD;  Location: [facility] EP LAB;  Service: Cardiology;  Laterality: N/A;  a fib, dccv/johny, mac, dm. sscu    TREATMENT OF CARDIAC ARRHYTHMIA N/A 5/31/2021    Procedure: CARDIOVERSION;  Surgeon: Daniel Arambula MD;  Location: [facility] EP LAB;  Service: Cardiology;  Laterality: N/A;  afib, DCCV, anest., DM, 3 prep    TREATMENT OF CARDIAC ARRHYTHMIA N/A 7/7/2021    Procedure: Cardioversion or Defibrillation;  Surgeon: Didier Tilley MD;  Location: [facility] EP LAB;  Service: Cardiology;  Laterality: N/A;  AF, JOHNY (cancel if complaint), DCCV, MAC, DM, 3 Prep    TREATMENT OF CARDIAC ARRHYTHMIA N/A 7/27/2021    Procedure: Cardioversion or Defibrillation;  Surgeon: Didier Tliley MD;   Location: [facility] EP LAB;  Service: Cardiology;  Laterality: N/A;  AF, ASHISH (cx if complaint), DCCV, MAC, DM, 3 Prep    TREATMENT OF CARDIAC ARRHYTHMIA N/A 1/26/2024    Procedure: Cardioversion or Defibrillation;  Surgeon: Koko Knight MD;  Location: [facility] EP LAB;  Service: Cardiology;  Laterality: N/A;  AF, ASHISH, DCCV, MAC, DM, 3 Prep *ADENOSINE TEST* *LIVER TRANSPLANT PATIENT*       Social History[3]    OBJECTIVE:     Vital Signs Range (Last 24H):         Significant Labs:  Lab Results   Component Value Date    WBC 6.84 02/12/2025    HGB 12.7 (L) 02/12/2025    HCT 42.2 02/12/2025     (L) 02/12/2025    CHOL 89 (L) 09/18/2024    TRIG 134 09/18/2024    HDL 26 (L) 09/18/2024    ALT 10 02/12/2025    AST 17 02/12/2025     02/12/2025    K 4.5 02/12/2025     02/12/2025    CREATININE 1.7 (H) 02/12/2025    BUN 36 (H) 02/12/2025    CO2 25 02/12/2025    TSH 0.435 01/08/2025    PSA 0.25 07/06/2022    INR 1.4 (H) 01/14/2025    HGBA1C 6.6 (H) 01/09/2025       Diagnostic Studies: No relevant studies.    EKG:   Results for orders placed or performed during the hospital encounter of 01/08/25   EKG 12-lead    Collection Time: 01/10/25 10:06 AM   Result Value Ref Range    QRS Duration 100 ms    OHS QTC Calculation 435 ms    Narrative    Test Reason : I48.91,    Vent. Rate : 120 BPM     Atrial Rate :    BPM     P-R Int :    ms          QRS Dur : 100 ms      QT Int : 308 ms       P-R-T Axes :    -48  75 degrees    QTcB Int : 435 ms    Atrial fibrillation with rapid ventricular response  Left anterior fascicular block  Nonspecific ST abnormality  Abnormal ECG  When compared with ECG of 08-Jan-2025 15:13,  Atrial fibrillation has replaced Sinus rhythm  Confirmed by Kole Charles ([id number]) on 1/10/2025 4:54:45 PM    Referred By: AAAREFERRAL SELF           Confirmed By: Kole Charles       2D ECHO:  TTE:  Results for orders placed or performed during the hospital encounter of 10/14/24   Echo   Result Value Ref Range    LV  "Diastolic Volume 98.99 mL    Echo EF Estimated 68 %    LV Systolic Volume 31.28 mL    IVS 1.1 0.6 - 1.1 cm    LVIDd 4.6 3.5 - 6.0 cm    LVIDs 2.9 2.1 - 4.0 cm    LVOT diameter 2.2 cm    PW 0.9 0.6 - 1.1 cm    IVRT 125.59 ms    AV LVOT peak gradient 3 mmHg    LVOT mn grad 2 mmHg    LVOT peak chandra 0.9 m/s    LVOT peak VTI 24.2 cm    RV- costello basal diam 3.9 cm    RVOT prox diameter 2.55 cm    RV S' 15.91 cm/s    LA size 3.82 cm    Left Atrium Major Axis 6.10 cm    Left Atrium Minor Axis 6.40 cm    LA Vol (MOD) 96.07 mL    RA Major [place] 5.96 cm    RA Area 19.8 cm2    AV valve area 0.7 cm2    AV area by cont VTI 0.8 cm2    AV regurgitation pressure 1/2 time 644.79 ms    AV peak gradient 84.6 mmHg    AV mean gradient 47.3 mmHg    Ao peak chandra 4.6 m/s    Ao .0 cm    MV Peak A Chandra 0.71 m/s    E wave deceleration time 459.63 ms    MV Peak E Chandra 0.45 m/s    E/A ratio 0.63     LV LATERAL E/E' RATIO 7.50     LV SEPTAL E/E' RATIO 7.50     TDI LATERAL 0.06 m/s    TDI SEPTAL 0.06 m/s    MV valve area by planimetry 1.46 cm2    TV peak gradient 27 mmHg    TR Max Chandra 2.59 m/s    Ascending aorta 3.26 cm    STJ 2.70 cm    P vein A 0.3 m/s    MV "A" wave duration 114.18 ms    Pulm vein "A" wave 114.18 ms    PV Peak D Chandra 0.35 m/s    PV Peak S Chandra 0.58 m/s    IVC diameter 1.22 cm    Sinus 3.06 cm    RA Width 4.53 cm    TAPSE 2.34 cm    LA WIDTH 4.52 cm    BSA 2.17 m2    LVOT stroke volume 91.9 cm3    LV Systolic Volume Index 14.5 mL/m2    LV Diastolic Volume Index 46.04 mL/m2    LVOT area 3.8 cm2    FS 37.0 28 - 44 %    Left Ventricle Relative Wall Thickness 0.39 cm    LV mass 158.8 g    LV Mass Index 73.9 g/m2    E/E' ratio 7.50 m/s    GLEN 42.6 mL/m2    LA Vol 91.67 cm3    Pulm vein S/D ratio 1.66     RVOT area 5.10 cm2    RV/LV Ratio 0.85 cm    GLEN (MOD) 44.7 mL/m2    AV Velocity Ratio 0.20     AV index (prosthetic) 0.20     CHRIS by Velocity Ratio 0.7 cm²    Triscuspid Valve Regurgitation Peak Gradient 27 mmHg    Mean e' 0.06 m/s "    ZLVIDS -3.03     ZLVIDD -4.17     Mitral Valve Heart Rate 53 bpm    TV resting pulmonary artery pressure 30 mmHg    RV TB RVSP 6 mmHg    Est. RA pres 3 mmHg    Narrative      Left Ventricle: The left ventricle is normal in size. Normal wall   thickness. There is normal systolic function with a visually estimated   ejection fraction of 60 - 65%.    Right Ventricle: Normal right ventricular cavity size. Wall thickness   is normal. Systolic function is normal.    Left Atrium: Left atrium is mildly dilated.    Aortic Valve: There is severe aortic valve sclerosis. Severely   restricted motion. There is severe stenosis. Aortic valve area by VTI is   0.7 cm2. Aortic valve peak velocity is 4.6 m/s. Mean gradient is 47.3   mmHg. The dimensionless index is 0.20. There is mild aortic regurgitation.    Mitral Valve: There is mild regurgitation.    Pulmonary Artery: No pulmonary hypertension. The estimated pulmonary   artery systolic pressure is 30 mmHg.    IVC/SVC: Normal venous pressure at 3 mmHg.         ASHISH:  Results for orders placed or performed during the hospital encounter of 01/26/24   Transesophageal echo (ASHISH)   Result Value Ref Range    BSA 2.18 m2    Left Atrial Appendage Peak Velocity 30.0 cm/s    Narrative      ASHISH prior to DCCV. The study was abbreviated due to high risk for   sedation (severely reduced LVEF, moderate-to-severe low flow AS).    Left Atrium: The left atrial appendage appears normal. Appendage   velocity is reduced at less than 40 cm/sec. There is no thrombus in the   left atrial appendage or left atrium.         ASSESSMENT/PLAN:                                                                                                                  02/17/2025  Tej Purdy is a 69 y.o., male.      Pre-op Assessment    I have reviewed the Patient Summary Reports.     I have reviewed the Nursing Notes. I have reviewed the NPO Status.   I have reviewed the Medications.     Review of  Systems  Anesthesia Hx:  No problems with previous Anesthesia   History of prior surgery of interest to airway management or planning:          Denies Family Hx of Anesthesia complications.    Denies Personal Hx of Anesthesia complications.                    Cardiovascular:     Hypertension Valvular problems/Murmurs, AS  CAD    Dysrhythmias atrial fibrillation  CHF   PVD                                Hepatic/GI:      Liver Disease,               Neurological:       Seizures                                Endocrine:  Diabetes, type 2               Physical Exam  General: Cooperative, Alert and Oriented    Airway:  Mallampati: III / II  Mouth Opening: Normal  TM Distance: Normal  Tongue: Normal  Neck ROM: Normal ROM    Dental:  Dentures        Anesthesia Plan  Type of Anesthesia, risks & benefits discussed:    Anesthesia Type: Gen Natural Airway  Intra-op Monitoring Plan: Standard ASA Monitors and Art Line  Post Op Pain Control Plan: multimodal analgesia and IV/PO Opioids PRN  Induction:  IV  Informed Consent: Informed consent signed with the Patient and all parties understand the risks and agree with anesthesia plan.  All questions answered.   ASA Score: 4  Day of Surgery Review of History & Physical: H&P Update referred to the surgeon/provider.    Ready For Surgery From Anesthesia Perspective.     .           [1]   Patient Active Problem List  Diagnosis    Mixed hyperlipidemia    Primary hypertension    Skin cancer    Kidney stone    Diabetic neuropathy    PAD (peripheral artery disease)    Leukocytosis    Abdominal pain    Type 2 diabetes mellitus, with long-term current use of insulin    Coronary artery disease involving native coronary artery of native heart without angina pectoris    Atrial fibrillation with rapid ventricular response    History of cardioversion    Chronic anticoagulation    S/P liver transplant    At risk for opportunistic infections    Prophylactic immunotherapy    Anemia of chronic disease     Long-term use of immunosuppressant medication    Weakness    Type 2 diabetes mellitus with hyperglycemia    Anemia due to unknown mechanism    Fatigue    Other ascites    Cholangiocarcinoma    Severe aortic stenosis    Unstable angina    Presence of drug-eluting stent in left circumflex coronary artery    Orthostatic dizziness    Nephrolithiasis    Chronic systolic heart failure    History of liver transplant    Iron deficiency anemia    Immunodeficiency due to drugs    S/P balloon aortic valvuloplasty    History of cholangiocarcinoma    Thrombocytopenia    Seizure    Hypercoagulable state due to atrial fibrillation    History of skin cancer    Immunocompromised state    Hypertensive urgency   [2]   Current Facility-Administered Medications:     sodium chloride 0.9% flush 10 mL, 10 mL, Intravenous, PRN, Shilo Carrasco MD    Current Outpatient Medications:     apixaban (ELIQUIS) 5 mg Tab, Take 1 tablet (5 mg total) by mouth 2 (two) times daily., Disp: 180 tablet, Rfl: 3    atorvastatin (LIPITOR) 80 MG tablet, Take 1 tablet (80 mg total) by mouth once daily., Disp: 90 tablet, Rfl: 3    blood sugar diagnostic Strp, To check BG 2 times daily, to use with insurance preferred meter (patient has a [employer] self-monitoring glucose meter), Disp: 100 strip, Rfl: 11    blood-glucose meter kit, To check BG 2 times daily, to use with insurance preferred meter, Disp: 1 each, Rfl: 0    buPROPion (WELLBUTRIN) 75 MG tablet, Take 75 mg by mouth Daily. In the morning, Disp: , Rfl:     carvediloL (COREG) 12.5 MG tablet, Take 1 tablet (12.5 mg total) by mouth 2 (two) times daily with meals., Disp: 180 tablet, Rfl: 3    cycloSPORINE modified, NEORAL, (NEORAL) 25 MG capsule, Take 2 capsules (50 mg total) by mouth every morning AND 1 capsule (25 mg total) every evening., Disp: 90 capsule, Rfl: 11    diazePAM (VALIUM) 5 MG tablet, Take 1 tablet (5 mg total) by mouth as needed for Anxiety. Take 1 tab 30 min prior to MRI, take 2nd  "as needed, Disp: 2 tablet, Rfl: 0    empagliflozin (JARDIANCE) 10 mg tablet, Take 1 tablet (10 mg total) by mouth once daily., Disp: 90 tablet, Rfl: 3    ferrous sulfate (IRON) 325 mg (65 mg iron) Tab tablet, Take 1 tablet (325 mg total) by mouth once daily., Disp: 30 tablet, Rfl: 2    furosemide (LASIX) 20 MG tablet, Take 1 tablet (20 mg total) by mouth once daily., Disp: 90 tablet, Rfl: 3    HYDROcodone-acetaminophen (NORCO) 5-325 mg per tablet, Take 1 tablet by mouth every 8 (eight) hours as needed for Pain., Disp: 12 tablet, Rfl: 0    insulin glargine U-100, Lantus, (LANTUS SOLOSTAR U-100 INSULIN) 100 unit/mL (3 mL) InPn pen, Inject 22 Units into the skin every evening., Disp: 15 mL, Rfl: 11    insulin lispro (HUMALOG KWIKPEN INSULIN) 100 unit/mL pen, Inject 6 Units into the skin 3 (three) times daily with meals. Plus sliding scale, MDD: 48 units, Disp: 15 mL, Rfl: 5    lancets Misc, To check BG 2 times daily, to use with insurance preferred meter, Disp: 100 each, Rfl: 11    levETIRAcetam (KEPPRA) 500 MG Tab, Take 1 tablet (500 mg total) by mouth 2 (two) times daily., Disp: 60 tablet, Rfl: 11    magnesium oxide (MAG-OX) 400 mg (241.3 mg magnesium) tablet, Take 1 tablet (400 mg total) by mouth 2 (two) times daily., Disp: 60 tablet, Rfl: 11    multivitamin capsule, Take 1 capsule by mouth once daily., Disp: , Rfl:     mycophenolate (CELLCEPT) 250 mg Cap, Take 2 capsules (500 mg total) by mouth 2 (two) times daily., Disp: 120 capsule, Rfl: 11    omega-3 fatty acids/fish oil (FISH OIL-OMEGA-3 FATTY ACIDS) 300-1,000 mg capsule, Take 1 capsule by mouth once daily. , Disp: , Rfl:     pantoprazole (PROTONIX) 40 MG tablet, Take 1 tablet (40 mg total) by mouth once daily., Disp: 90 tablet, Rfl: 1    pen needle, diabetic (BD ULTRA-FINE GÉNESIS PEN NEEDLE) 32 gauge x 5/32" Ndle, Use to inject insulin 4 times daily, Disp: 200 each, Rfl: 11    pregabalin (LYRICA) 75 MG capsule, Take 1 capsule (75 mg total) by mouth 2 (two) times " daily., Disp: 60 capsule, Rfl: 0    QUEtiapine (SEROQUEL) 100 MG Tab, Take 1 tablet (100 mg total) by mouth every evening., Disp: 90 tablet, Rfl: 1    sacubitriL-valsartan (ENTRESTO) 49-51 mg per tablet, Take 1 tablet by mouth 2 (two) times daily., Disp: 180 tablet, Rfl: 3  [3]   Social History  Socioeconomic History    Marital status:    Tobacco Use    Smoking status: Former     Current packs/day: 0.00     Types: Cigarettes     Quit date: 1980     Years since quittin.0    Smokeless tobacco: Former   Substance and Sexual Activity    Alcohol use: Not Currently    Drug use: Never     Social Drivers of Health     Financial Resource Strain: Patient Declined (2025)    Overall Financial Resource Strain (CARDIA)     Difficulty of Paying Living Expenses: Patient declined   Food Insecurity: Patient Declined (2025)    Hunger Vital Sign     Worried About Running Out of Food in the Last Year: Patient declined     Ran Out of Food in the Last Year: Patient declined   Transportation Needs: Patient Declined (2025)    TRANSPORTATION NEEDS     Transportation : Patient declined   Physical Activity: Unknown (2025)    Exercise Vital Sign     Days of Exercise per Week: 0 days   Stress: No Stress Concern Present (2025)    [employer] [place] [employer] - Occupational Stress Questionnaire     Feeling of Stress : Not at all   Housing Stability: Unknown (2025)    Housing Stability Vital Sign     Unable to Pay for Housing in the Last Year: No     Homeless in the Last Year: Patient declined

## 2025-02-18 ENCOUNTER — ANESTHESIA (OUTPATIENT)
Dept: MEDSURG UNIT | Facility: HOSPITAL | Age: 70
DRG: 267 | End: 2025-02-18
Payer: MEDICARE

## 2025-02-18 ENCOUNTER — HOSPITAL ENCOUNTER (INPATIENT)
Facility: HOSPITAL | Age: 70
LOS: 1 days | Discharge: HOME | DRG: 267 | End: 2025-02-19
Attending: INTERNAL MEDICINE | Admitting: INTERNAL MEDICINE
Payer: MEDICARE

## 2025-02-18 DIAGNOSIS — Z01.818 PRE-OP TESTING: ICD-10-CM

## 2025-02-18 DIAGNOSIS — Z95.3 S/P TAVR (TRANSCATHETER AORTIC VALVE REPLACEMENT): Primary | ICD-10-CM

## 2025-02-18 DIAGNOSIS — I49.9 ARRHYTHMIA: ICD-10-CM

## 2025-02-18 DIAGNOSIS — I44.7 LBBB (LEFT BUNDLE BRANCH BLOCK): ICD-10-CM

## 2025-02-18 DIAGNOSIS — I35.0 SEVERE AORTIC STENOSIS: ICD-10-CM

## 2025-02-18 LAB
ABO + RH BLD: NORMAL
ANION GAP SERPL CALC-SCNC: 10 MMOL/L (ref 8–16)
BLD GP AB SCN CELLS X3 SERPL QL: NORMAL
BNP SERPL-MCNC: 125 PG/ML (ref 0–99)
BUN SERPL-MCNC: 27 MG/DL (ref 8–23)
CALCIUM SERPL-MCNC: 9.4 MG/DL (ref 8.7–10.5)
CHLORIDE SERPL-SCNC: 106 MMOL/L (ref 95–110)
CO2 SERPL-SCNC: 23 MMOL/L (ref 23–29)
CREAT SERPL-MCNC: 1.1 MG/DL (ref 0.5–1.4)
ERYTHROCYTE [DISTWIDTH] IN BLOOD BY AUTOMATED COUNT: 17.7 % (ref 11.5–14.5)
EST. GFR  (NO RACE VARIABLE): >60 ML/MIN/1.73 M^2
GLUCOSE SERPL-MCNC: 116 MG/DL (ref 70–110)
HCT VFR BLD AUTO: 39.1 % (ref 40–54)
HGB BLD-MCNC: 12.2 G/DL (ref 14–18)
MCH RBC QN AUTO: 26.3 PG (ref 27–31)
MCHC RBC AUTO-ENTMCNC: 31.2 G/DL (ref 32–36)
MCV RBC AUTO: 84 FL (ref 82–98)
OHS QRS DURATION: 126 MS
OHS QRS DURATION: 160 MS
OHS QTC CALCULATION: 485 MS
OHS QTC CALCULATION: 529 MS
PLATELET # BLD AUTO: 94 K/UL (ref 150–450)
PMV BLD AUTO: 11.4 FL (ref 9.2–12.9)
POCT GLUCOSE: 127 MG/DL (ref 70–110)
POCT GLUCOSE: 137 MG/DL (ref 70–110)
POCT GLUCOSE: 149 MG/DL (ref 70–110)
POTASSIUM SERPL-SCNC: 4 MMOL/L (ref 3.5–5.1)
RBC # BLD AUTO: 4.63 M/UL (ref 4.6–6.2)
SODIUM SERPL-SCNC: 139 MMOL/L (ref 136–145)
SPECIMEN OUTDATE: NORMAL
WBC # BLD AUTO: 6.06 K/UL (ref 3.9–12.7)

## 2025-02-18 PROCEDURE — 25500020 PHARM REV CODE 255: Performed by: INTERNAL MEDICINE

## 2025-02-18 PROCEDURE — 82962 GLUCOSE BLOOD TEST: CPT | Performed by: INTERNAL MEDICINE

## 2025-02-18 PROCEDURE — 25000003 PHARM REV CODE 250: Performed by: STUDENT IN AN ORGANIZED HEALTH CARE EDUCATION/TRAINING PROGRAM

## 2025-02-18 PROCEDURE — 93005 ELECTROCARDIOGRAM TRACING: CPT

## 2025-02-18 PROCEDURE — 37000009 HC ANESTHESIA EA ADD 15 MINS: Performed by: INTERNAL MEDICINE

## 2025-02-18 PROCEDURE — C1779 LEAD, PMKR, TRANSVENOUS VDD: HCPCS | Performed by: INTERNAL MEDICINE

## 2025-02-18 PROCEDURE — C1760 CLOSURE DEV, VASC: HCPCS | Performed by: INTERNAL MEDICINE

## 2025-02-18 PROCEDURE — 86850 RBC ANTIBODY SCREEN: CPT | Performed by: INTERNAL MEDICINE

## 2025-02-18 PROCEDURE — 25000003 PHARM REV CODE 250

## 2025-02-18 PROCEDURE — 93010 ELECTROCARDIOGRAM REPORT: CPT | Mod: XE,,, | Performed by: INTERNAL MEDICINE

## 2025-02-18 PROCEDURE — C1769 GUIDE WIRE: HCPCS | Performed by: INTERNAL MEDICINE

## 2025-02-18 PROCEDURE — 27800903 OPTIME MED/SURG SUP & DEVICES OTHER IMPLANTS: Performed by: INTERNAL MEDICINE

## 2025-02-18 PROCEDURE — 02RF38Z REPLACEMENT OF AORTIC VALVE WITH ZOOPLASTIC TISSUE, PERCUTANEOUS APPROACH: ICD-10-PCS | Performed by: INTERNAL MEDICINE

## 2025-02-18 PROCEDURE — 25000003 PHARM REV CODE 250: Performed by: INTERNAL MEDICINE

## 2025-02-18 PROCEDURE — 27201423 OPTIME MED/SURG SUP & DEVICES STERILE SUPPLY: Performed by: INTERNAL MEDICINE

## 2025-02-18 PROCEDURE — 63600175 PHARM REV CODE 636 W HCPCS

## 2025-02-18 PROCEDURE — 85027 COMPLETE CBC AUTOMATED: CPT | Performed by: INTERNAL MEDICINE

## 2025-02-18 PROCEDURE — 33361 REPLACE AORTIC VALVE PERQ: CPT | Performed by: INTERNAL MEDICINE

## 2025-02-18 PROCEDURE — 80048 BASIC METABOLIC PNL TOTAL CA: CPT | Performed by: INTERNAL MEDICINE

## 2025-02-18 PROCEDURE — 63600175 PHARM REV CODE 636 W HCPCS: Performed by: STUDENT IN AN ORGANIZED HEALTH CARE EDUCATION/TRAINING PROGRAM

## 2025-02-18 PROCEDURE — 63600175 PHARM REV CODE 636 W HCPCS: Performed by: ANESTHESIOLOGY

## 2025-02-18 PROCEDURE — C1894 INTRO/SHEATH, NON-LASER: HCPCS | Performed by: INTERNAL MEDICINE

## 2025-02-18 PROCEDURE — 63600175 PHARM REV CODE 636 W HCPCS: Performed by: INTERNAL MEDICINE

## 2025-02-18 PROCEDURE — 5A1223Z PERFORMANCE OF CARDIAC PACING, CONTINUOUS: ICD-10-PCS | Performed by: INTERNAL MEDICINE

## 2025-02-18 PROCEDURE — 20600001 HC STEP DOWN PRIVATE ROOM

## 2025-02-18 PROCEDURE — 83880 ASSAY OF NATRIURETIC PEPTIDE: CPT | Performed by: INTERNAL MEDICINE

## 2025-02-18 PROCEDURE — 37000008 HC ANESTHESIA 1ST 15 MINUTES: Performed by: INTERNAL MEDICINE

## 2025-02-18 DEVICE — SYS EVOLUT FX DELIVERY 34MM: Type: IMPLANTABLE DEVICE | Site: OTHER (ADD COMMENT) | Status: FUNCTIONAL

## 2025-02-18 DEVICE — SYS EVOLUT FX LOADING 34MM: Type: IMPLANTABLE DEVICE | Site: OTHER (ADD COMMENT) | Status: FUNCTIONAL

## 2025-02-18 DEVICE — IMPLANTABLE DEVICE: Type: IMPLANTABLE DEVICE | Site: HEART | Status: FUNCTIONAL

## 2025-02-18 RX ORDER — BUPROPION HYDROCHLORIDE 75 MG/1
75 TABLET ORAL DAILY
Status: DISCONTINUED | OUTPATIENT
Start: 2025-02-19 | End: 2025-02-19 | Stop reason: HOSPADM

## 2025-02-18 RX ORDER — MYCOPHENOLATE MOFETIL 250 MG/1
500 CAPSULE ORAL 2 TIMES DAILY
Status: DISCONTINUED | OUTPATIENT
Start: 2025-02-18 | End: 2025-02-19 | Stop reason: HOSPADM

## 2025-02-18 RX ORDER — PROCHLORPERAZINE EDISYLATE 5 MG/ML
5 INJECTION INTRAMUSCULAR; INTRAVENOUS ONCE AS NEEDED
Status: COMPLETED | OUTPATIENT
Start: 2025-02-18 | End: 2025-02-18

## 2025-02-18 RX ORDER — SODIUM CHLORIDE 0.9 % (FLUSH) 0.9 %
10 SYRINGE (ML) INJECTION
Status: DISCONTINUED | OUTPATIENT
Start: 2025-02-18 | End: 2025-02-19 | Stop reason: HOSPADM

## 2025-02-18 RX ORDER — CEFAZOLIN SODIUM 1 G/3ML
INJECTION, POWDER, FOR SOLUTION INTRAMUSCULAR; INTRAVENOUS
Status: DISCONTINUED | OUTPATIENT
Start: 2025-02-18 | End: 2025-02-18

## 2025-02-18 RX ORDER — QUETIAPINE FUMARATE 100 MG/1
100 TABLET, FILM COATED ORAL NIGHTLY
Status: DISCONTINUED | OUTPATIENT
Start: 2025-02-18 | End: 2025-02-19 | Stop reason: HOSPADM

## 2025-02-18 RX ORDER — FUROSEMIDE 20 MG/1
20 TABLET ORAL DAILY
Status: DISCONTINUED | OUTPATIENT
Start: 2025-02-19 | End: 2025-02-19 | Stop reason: HOSPADM

## 2025-02-18 RX ORDER — CYCLOBENZAPRINE HCL 5 MG
5 TABLET ORAL 3 TIMES DAILY PRN
Status: CANCELLED | OUTPATIENT
Start: 2025-02-18

## 2025-02-18 RX ORDER — INSULIN GLARGINE 100 [IU]/ML
22 INJECTION, SOLUTION SUBCUTANEOUS NIGHTLY
Status: DISCONTINUED | OUTPATIENT
Start: 2025-02-18 | End: 2025-02-19 | Stop reason: HOSPADM

## 2025-02-18 RX ORDER — INSULIN ASPART 100 [IU]/ML
0-10 INJECTION, SOLUTION INTRAVENOUS; SUBCUTANEOUS EVERY 6 HOURS PRN
Status: DISCONTINUED | OUTPATIENT
Start: 2025-02-18 | End: 2025-02-19 | Stop reason: HOSPADM

## 2025-02-18 RX ORDER — SODIUM CHLORIDE 9 MG/ML
INJECTION, SOLUTION INTRAVENOUS CONTINUOUS
Status: ACTIVE | OUTPATIENT
Start: 2025-02-18 | End: 2025-02-18

## 2025-02-18 RX ORDER — PANTOPRAZOLE SODIUM 40 MG/1
40 TABLET, DELAYED RELEASE ORAL DAILY
Status: DISCONTINUED | OUTPATIENT
Start: 2025-02-19 | End: 2025-02-19 | Stop reason: HOSPADM

## 2025-02-18 RX ORDER — NOREPINEPHRINE BITARTRATE 1 MG/ML
INJECTION, SOLUTION INTRAVENOUS
Status: DISCONTINUED | OUTPATIENT
Start: 2025-02-18 | End: 2025-02-18

## 2025-02-18 RX ORDER — ONDANSETRON 4 MG/1
8 TABLET, ORALLY DISINTEGRATING ORAL EVERY 8 HOURS PRN
Status: DISCONTINUED | OUTPATIENT
Start: 2025-02-18 | End: 2025-02-19 | Stop reason: HOSPADM

## 2025-02-18 RX ORDER — DIPHENHYDRAMINE HCL 50 MG
50 CAPSULE ORAL ONCE
Status: COMPLETED | OUTPATIENT
Start: 2025-02-18 | End: 2025-02-18

## 2025-02-18 RX ORDER — INSULIN GLARGINE 100 [IU]/ML
22 INJECTION, SOLUTION SUBCUTANEOUS NIGHTLY
Status: DISCONTINUED | OUTPATIENT
Start: 2025-02-18 | End: 2025-02-18

## 2025-02-18 RX ORDER — FENTANYL CITRATE 50 UG/ML
INJECTION, SOLUTION INTRAMUSCULAR; INTRAVENOUS
Status: DISCONTINUED | OUTPATIENT
Start: 2025-02-18 | End: 2025-02-18

## 2025-02-18 RX ORDER — LIDOCAINE HYDROCHLORIDE 20 MG/ML
INJECTION, SOLUTION EPIDURAL; INFILTRATION; INTRACAUDAL; PERINEURAL
Status: DISCONTINUED | OUTPATIENT
Start: 2025-02-18 | End: 2025-02-19 | Stop reason: HOSPADM

## 2025-02-18 RX ORDER — OXYCODONE AND ACETAMINOPHEN 5; 325 MG/1; MG/1
1 TABLET ORAL ONCE
Refills: 0 | Status: COMPLETED | OUTPATIENT
Start: 2025-02-18 | End: 2025-02-18

## 2025-02-18 RX ORDER — CARVEDILOL 12.5 MG/1
12.5 TABLET ORAL 2 TIMES DAILY WITH MEALS
Status: DISCONTINUED | OUTPATIENT
Start: 2025-02-18 | End: 2025-02-19 | Stop reason: HOSPADM

## 2025-02-18 RX ORDER — NAPROXEN SODIUM 220 MG/1
81 TABLET, FILM COATED ORAL DAILY
Status: DISCONTINUED | OUTPATIENT
Start: 2025-02-18 | End: 2025-02-19 | Stop reason: HOSPADM

## 2025-02-18 RX ORDER — GLUCAGON 1 MG
1 KIT INJECTION
Status: DISCONTINUED | OUTPATIENT
Start: 2025-02-18 | End: 2025-02-19 | Stop reason: HOSPADM

## 2025-02-18 RX ORDER — OXYCODONE HYDROCHLORIDE 10 MG/1
10 TABLET ORAL EVERY 4 HOURS PRN
Status: DISCONTINUED | OUTPATIENT
Start: 2025-02-18 | End: 2025-02-19 | Stop reason: HOSPADM

## 2025-02-18 RX ORDER — HEPARIN SOD,PORCINE/0.9 % NACL 1000/500ML
INTRAVENOUS SOLUTION INTRAVENOUS
Status: DISCONTINUED | OUTPATIENT
Start: 2025-02-18 | End: 2025-02-19 | Stop reason: HOSPADM

## 2025-02-18 RX ORDER — ATORVASTATIN CALCIUM 40 MG/1
80 TABLET, FILM COATED ORAL DAILY
Status: DISCONTINUED | OUTPATIENT
Start: 2025-02-18 | End: 2025-02-19 | Stop reason: HOSPADM

## 2025-02-18 RX ORDER — ACETAMINOPHEN 325 MG/1
650 TABLET ORAL EVERY 4 HOURS PRN
Status: DISCONTINUED | OUTPATIENT
Start: 2025-02-18 | End: 2025-02-19 | Stop reason: HOSPADM

## 2025-02-18 RX ORDER — PREGABALIN 75 MG/1
75 CAPSULE ORAL 2 TIMES DAILY
Status: DISCONTINUED | OUTPATIENT
Start: 2025-02-18 | End: 2025-02-19 | Stop reason: HOSPADM

## 2025-02-18 RX ORDER — PROTAMINE SULFATE 10 MG/ML
INJECTION, SOLUTION INTRAVENOUS
Status: DISCONTINUED | OUTPATIENT
Start: 2025-02-18 | End: 2025-02-18

## 2025-02-18 RX ORDER — LEVETIRACETAM 500 MG/1
500 TABLET ORAL 2 TIMES DAILY
Status: DISCONTINUED | OUTPATIENT
Start: 2025-02-18 | End: 2025-02-19 | Stop reason: HOSPADM

## 2025-02-18 RX ORDER — SODIUM CHLORIDE 9 MG/ML
INJECTION, SOLUTION INTRAVENOUS CONTINUOUS
Status: DISCONTINUED | OUTPATIENT
Start: 2025-02-18 | End: 2025-02-19

## 2025-02-18 RX ORDER — HEPARIN SODIUM 1000 [USP'U]/ML
INJECTION, SOLUTION INTRAVENOUS; SUBCUTANEOUS
Status: DISCONTINUED | OUTPATIENT
Start: 2025-02-18 | End: 2025-02-18

## 2025-02-18 RX ORDER — PROPOFOL 10 MG/ML
VIAL (ML) INTRAVENOUS CONTINUOUS PRN
Status: DISCONTINUED | OUTPATIENT
Start: 2025-02-18 | End: 2025-02-18

## 2025-02-18 RX ORDER — ACETAMINOPHEN 500 MG
1000 TABLET ORAL ONCE
Status: COMPLETED | OUTPATIENT
Start: 2025-02-18 | End: 2025-02-18

## 2025-02-18 RX ORDER — ONDANSETRON HYDROCHLORIDE 2 MG/ML
4 INJECTION, SOLUTION INTRAVENOUS ONCE
Status: COMPLETED | OUTPATIENT
Start: 2025-02-18 | End: 2025-02-18

## 2025-02-18 RX ADMIN — CARVEDILOL 12.5 MG: 12.5 TABLET, FILM COATED ORAL at 04:02

## 2025-02-18 RX ADMIN — HEPARIN SODIUM 11000 UNITS: 1000 INJECTION, SOLUTION INTRAVENOUS; SUBCUTANEOUS at 11:02

## 2025-02-18 RX ADMIN — SODIUM CHLORIDE: 9 INJECTION, SOLUTION INTRAVENOUS at 12:02

## 2025-02-18 RX ADMIN — CEFAZOLIN 2 G: 330 INJECTION, POWDER, FOR SOLUTION INTRAMUSCULAR; INTRAVENOUS at 10:02

## 2025-02-18 RX ADMIN — ACETAMINOPHEN 650 MG: 325 TABLET ORAL at 12:02

## 2025-02-18 RX ADMIN — NOREPINEPHRINE BITARTRATE 8 MCG: 1 INJECTION, SOLUTION, CONCENTRATE INTRAVENOUS at 10:02

## 2025-02-18 RX ADMIN — NOREPINEPHRINE BITARTRATE 0.02 MCG/KG/MIN: 1 INJECTION, SOLUTION, CONCENTRATE INTRAVENOUS at 10:02

## 2025-02-18 RX ADMIN — FENTANYL CITRATE 50 MCG: 50 INJECTION INTRAMUSCULAR; INTRAVENOUS at 10:02

## 2025-02-18 RX ADMIN — OXYCODONE HYDROCHLORIDE AND ACETAMINOPHEN 1 TABLET: 5; 325 TABLET ORAL at 03:02

## 2025-02-18 RX ADMIN — ONDANSETRON 4 MG: 2 INJECTION INTRAMUSCULAR; INTRAVENOUS at 01:02

## 2025-02-18 RX ADMIN — ASPIRIN 81 MG CHEWABLE TABLET 81 MG: 81 TABLET CHEWABLE at 02:02

## 2025-02-18 RX ADMIN — QUETIAPINE FUMARATE 100 MG: 100 TABLET ORAL at 08:02

## 2025-02-18 RX ADMIN — PREGABALIN 75 MG: 75 CAPSULE ORAL at 02:02

## 2025-02-18 RX ADMIN — SODIUM CHLORIDE: 9 INJECTION, SOLUTION INTRAVENOUS at 09:02

## 2025-02-18 RX ADMIN — SODIUM CHLORIDE, SODIUM GLUCONATE, SODIUM ACETATE, POTASSIUM CHLORIDE, MAGNESIUM CHLORIDE, SODIUM PHOSPHATE, DIBASIC, AND POTASSIUM PHOSPHATE: .53; .5; .37; .037; .03; .012; .00082 INJECTION, SOLUTION INTRAVENOUS at 11:02

## 2025-02-18 RX ADMIN — MYCOPHENOLATE MOFETIL 500 MG: 250 CAPSULE ORAL at 08:02

## 2025-02-18 RX ADMIN — ACETAMINOPHEN 1000 MG: 500 TABLET ORAL at 09:02

## 2025-02-18 RX ADMIN — PROPOFOL 50 MCG/KG/MIN: 10 INJECTION, EMULSION INTRAVENOUS at 10:02

## 2025-02-18 RX ADMIN — SODIUM CHLORIDE, SODIUM GLUCONATE, SODIUM ACETATE, POTASSIUM CHLORIDE, MAGNESIUM CHLORIDE, SODIUM PHOSPHATE, DIBASIC, AND POTASSIUM PHOSPHATE: .53; .5; .37; .037; .03; .012; .00082 INJECTION, SOLUTION INTRAVENOUS at 10:02

## 2025-02-18 RX ADMIN — INSULIN GLARGINE 22 UNITS: 100 INJECTION, SOLUTION SUBCUTANEOUS at 08:02

## 2025-02-18 RX ADMIN — PROCHLORPERAZINE EDISYLATE 5 MG: 5 INJECTION INTRAMUSCULAR; INTRAVENOUS at 02:02

## 2025-02-18 RX ADMIN — SODIUM CHLORIDE: 0.9 INJECTION, SOLUTION INTRAVENOUS at 10:02

## 2025-02-18 RX ADMIN — LEVETIRACETAM 500 MG: 500 TABLET, FILM COATED ORAL at 08:02

## 2025-02-18 RX ADMIN — GLYCOPYRROLATE 0.2 MG: 0.2 INJECTION INTRAMUSCULAR; INTRAVENOUS at 11:02

## 2025-02-18 RX ADMIN — PREGABALIN 75 MG: 75 CAPSULE ORAL at 08:02

## 2025-02-18 RX ADMIN — LEVETIRACETAM 500 MG: 500 TABLET, FILM COATED ORAL at 02:02

## 2025-02-18 RX ADMIN — DIPHENHYDRAMINE HYDROCHLORIDE 50 MG: 50 CAPSULE ORAL at 09:02

## 2025-02-18 RX ADMIN — NOREPINEPHRINE BITARTRATE 8 MCG: 1 INJECTION, SOLUTION, CONCENTRATE INTRAVENOUS at 11:02

## 2025-02-18 RX ADMIN — CYCLOSPORINE 25 MG: 25 CAPSULE, LIQUID FILLED ORAL at 06:02

## 2025-02-18 RX ADMIN — PROTAMINE SULFATE 110 MG: 10 INJECTION, SOLUTION INTRAVENOUS at 11:02

## 2025-02-18 RX ADMIN — ATORVASTATIN CALCIUM 80 MG: 40 TABLET, FILM COATED ORAL at 02:02

## 2025-02-18 NOTE — ASSESSMENT & PLAN NOTE
69 y.o. male referred by Dr Estevez for evaluation of severe AS (NYHA Class II sx).     The patient has undergone the following TAVR work-up:   ECHO (Date 10.14.24): CHRIS= 0.7 cm2, MG= 46mmHg, Peak Chandra= 4.7 m/s, EF= 55%.   LHC (Date 12.23): S/P PCI to [facility]  STS: 2%   Frailty: 2/4   Iliacs are >7    LVOT area by CTA is 5.6 cm2 (30 mm X 25 mm) and Avg Diameter is 26.7 per my independent review of images on [employer].  Incidental findings on CT: Non contributory  CT Surgery risk assessment:  Dr. Craig note  Rhythm issues: Afib  KCCQ/5 meter walk: Done  Comorbidities: Cirrhosis, CAD        Tej Purdy is a 34 mm  Evolut valve candidate via RTF access.     Transcatheter Aortic valve replacement   Valve: 34 Evolut  ECMO:  On Call  TAVR access: RTF  Valvuloplasty balloon: None  Viabahn size if needed: 8X40  Antithrombotic therapy: ASA/Plavix  Temporary pacing wire: No, Will pace from TAVR wire  Pacemaker risk factors: LAFB   EP Consult ordered if post procedure arrhythmias develop  Post op destination: Stepdown  To be done during procedure:  Antibiotics given  CT Surgery present in lab for valve deployment per standard of care.  Surgeon :  Heart failure on admission?  CONSENTING:  The patient was told that the procedure carries around a 12.5% risk of permanent pacemaker requirement and that risk depends on the patients underlying conduction system as described in item 8.   Prior to the clinc visit, all available records from referring provider were reviewed.  I have personally reviewed all the lab tests available related to the patient's case  The patient's images were reviewed by myself and the procedural planning was done with my own interpretation of the iliac and aortic anatomy based on CTA, angiography or ASHISH. I have reviewed all other imaging studies relevant to the patient including echocardiography and coronary angiography.  Patient was educated abut the pathophysiology and natural history of severe aortic  stenosis and was educated about the treatment options including surgical and transcatheter valve replacement. She agrees to be full code for the forseable future and to undergo workup for treatment of severe AS.   The risks, benefits, and alternatives of transcatheter aortic valve replacement were discussed with the patient. All questions were answered and informed consent was obtained. I had a detailed discussion with the patient regarding risk of stroke, MI, bleeding access site complications including limb loss, allergy, kidney failure including dialysis and death.  The patient understands the risks and benefits and wishes to go ahead with the procedure.  The referring Cardiologist was notified of the plan  All patient's questions were answered     Shared Decision Making:  This patient was presented to a multidisciplinary heart team involving both a Structural Heart Specialist (Interventional Cardiologist) and a Cardiothoracic Surgeon. The patient was involved in shared decision-making to define clearer goals for treatment and align health decisions with their current values.  The patient understands the risks and expected benefits of their treatment options.

## 2025-02-18 NOTE — PLAN OF CARE
"Vss. S/p tavr. Pt arrived to ep pacu 2 with left wrist radial vascband. Cdi. 12 ml of air, placed at 1146. Warm to touch, positive cap refill noted. Drsg cdi. Per md order, air to start be removed at 1346.  Pt also has right groin, perclose x2, hemostasis at 1139. Quick clot and trans film noted. Doppler pulses ble and marked. Foam heel protectors on and peeled back. Bedrest x2hrs. Done at 1339. Pt aaox4 follows commands. Since pt arrived, pt wanting sips of water frequently.  Pt did get nauseous prior to transfer to csu room. 12 lead EKG done and in chart.  Zofran given iv x1 per md order.  Pt complaints of "soreness to right sc tvp/cordis". Prn tylenol given per md order. At this time "tolerable". Bg 127. Csu rn to give meds when pt able to swallow per md order. Tele box on confirmed by tele tech. Pt does have lbbb, ep consulted and dr martinez saw pt in ep pacu 2.  Pt has right sc tvp. Tvp settings are vvi 40ppm, 25 v ma, .8 v sensitivity.  Pt's belongings bag x2 from sscu locker on stretcher with pt. Dr martinez saw pt in ep pacu 2, ok to go to csu room.  Prior to transfer to new room, pt states "nausea better." See flowsheet for full assessment.   "

## 2025-02-18 NOTE — PROGRESS NOTES
Upon arrival to [facility] room 358, during transport from stretcher to bed, pt gagging and nauseous.  Pt was given 4mg zofran iv per md order. Resolved prior to transfer. Unable to get in touch with dr hurtado, dr blanton with anesthesia notified. New orders given per telephone order read back. Compazine iv vial pulled from cath lab holding pyxis.  Csu rn in room, csu rn to give pt one time dose iv.  Will continue to monitor. Hob elevated. Pt's wife to come back to hospital later. Re updated on new room number over text messaging system.

## 2025-02-18 NOTE — ASSESSMENT & PLAN NOTE
Tej Purdy with severe AS s/p TAVR (2/18/2025), by Dr. Nugent. Last EF 10/2024 reported to be %65  Symptomatic severe AS with LVEF of 65% s/p TAVR with new LBBB. Recommend overnight telemetry monitoring. If LBBB persists then will plan for EPS +/-PPM.    Patient has Afib and last time taking eliquis was 3 days ago.   He denies any chest ports, radiation or previous surgeries     TVP: VVI, base rate 40, threshold 1 Mv , output    PLAN:  - Continue telemetry and TVP overnight  - Repeat ECG at 5 am  - NPO at MN, for possible PPM placement in AM  - Routine Post op management per structural team    - EP team will continue to follow, please call for any questions or concerns   - Provided rhythm remains stable will plan for outpatient event monitor on discharge.  - No evidence of AV block or change in QRS. No indication for PPM at this time. Will continue to monitor.    If LBBB:  - If LBBB persists in AM, will need EP study to evaluate the need for PPM  - If LBBB resolves, will go home with 30 day event monitor

## 2025-02-18 NOTE — HPI
69 y.o. male with HTN, HLD, T2DM on insulin, paroxysmal Afib on eliquis, PAD, hx HCC s/p liver transplant 1/2022 and RFA/TACE 12/20, HFrEF now recovered to 60-65%, severe AS s/p BAV 2/1/24 who presents for TAVR. EKG after procedure is showing LBBB with QRS measuring up to 160 ms. EP consulted for management and monitoring of rhythm

## 2025-02-18 NOTE — CONSULTS
[facility] - [facility] Lab  Cardiac Electrophysiology  Consult Note    Admission Date: 2/18/2025  Code Status: Prior   Attending Provider: Shilo Carrasco MD  Consulting Provider: Manuel Mayorga MD  Principal Problem:<principal problem not specified>    Inpatient consult to Electrophysiology  Consult performed by: Manuel Jara MD  Consult ordered by: Jovan MD        Subjective:     Consult: LBBB post TAVR     HPI:   69 y.o. male with HTN, HLD, T2DM on insulin, paroxysmal Afib on eliquis, PAD, hx HCC s/p liver transplant 1/2022 and RFA/TACE 12/20, HFrEF now recovered to 60-65%, severe AS s/p BAV 2/1/24 who presents for TAVR. EKG after procedure is showing LBBB with QRS measuring up to 160 ms. EP consulted for management and monitoring of rhythm     Past Medical History:   Diagnosis Date    Acute appendicitis 06/02/2023    Aortic stenosis     Atrial fibrillation     johny/cardioversion on 1/26/24    Calculus of ureter 12/22/2023    CHF (congestive heart failure)     Cholangiocarcinoma 03/16/2022    s/p liver transplant    Cirrhosis 11/23/2020    s/p transplant 2022 for CASTILLO cirrhosis and cholangiocarcinoma    Diabetes mellitus, type 2     c/b Diabetic neuropathy    HTN (hypertension) 11/23/2020    Hyperlipidemia 11/23/2020    Kidney stones 11/23/2020    S/p lithotripsy 2020    PAD (peripheral artery disease)     Skin cancer 11/23/2020       Past Surgical History:   Procedure Laterality Date    ANGIOGRAM, CORONARY, WITH LEFT HEART CATHETERIZATION N/A 12/26/2023    Procedure: Angiogram, Coronary, with Left Heart Cath;  Surgeon: Carlos King MD;  Location: [facility] CATH LAB;  Service: Cardiology;  Laterality: N/A;    AORTIC VALVULOPLASTY N/A 2/1/2024    Procedure: REPAIR, AORTIC VALVE;  Surgeon: Shilo Carrasco MD;  Location: [facility] CATH LAB;  Service: Cardiology;  Laterality: N/A;    COLONOSCOPY  10/2020    ECHOCARDIOGRAM,TRANSESOPHAGEAL N/A 1/26/2024    Procedure: Transesophageal echo  (ASHISH) intra-procedure log documentation;  Surgeon: Nick Mathur III, MD;  Location: [facility] EP LAB;  Service: Cardiology;  Laterality: N/A;    ESOPHAGEAL MANOMETRY WITH MEASUREMENT OF IMPEDANCE N/A 7/17/2023    Procedure: MANOMETRY, ESOPHAGUS, WITH IMPEDANCE MEASUREMENT;  Surgeon: Klarissa Zamora MD;  Location: [facility] ENDO (4TH FLR);  Service: Gastroenterology;  Laterality: N/A;  pt diabetic  liver txp  prep insructions sent to pt via email and portal -Jm    ESOPHAGOGASTRODUODENOSCOPY  10/2020    ESOPHAGOGASTRODUODENOSCOPY N/A 7/1/2021    Procedure: EGD (ESOPHAGOGASTRODUODENOSCOPY);  Surgeon: Jovan David MD;  Location: [facility] ENDO (4TH FLR);  Service: Endoscopy;  Laterality: N/A;  HCC. Listed for liver transplant. EGD for variceal surveillance.  cardiac clearance and blood thinenr approval received, see telephone encounter 6/23/21-BB  fully vaccinated-BB  labs same day of procedure-BB    EXCISION OF HYDROCELE Right     hemorroid surgery      hemorroidectomy      KIDNEY STONE SURGERY      LAPAROSCOPIC APPENDECTOMY N/A 6/2/2023    Procedure: APPENDECTOMY, LAPAROSCOPIC;  Surgeon: Shan Ross MD;  Location: [facility] OR 2ND FLR;  Service: General;  Laterality: N/A;    LEFT HEART CATHETERIZATION N/A 1/27/2021    Procedure: Left heart cath;  Surgeon: Kris Shelton MD;  Location: [facility] CATH LAB;  Service: Cardiology;  Laterality: N/A;    liver mass removal      LIVER TRANSPLANT N/A 1/6/2022    Procedure: TRANSPLANT, LIVER;  Surgeon: Lizet Garcia MD;  Location: [facility] OR 2ND FLR;  Service: Transplant;  Laterality: N/A;    MAGNETIC RESONANCE IMAGING N/A 1/13/2025    Procedure: MRI (Magnetic Resonance Imagine);  Surgeon: Gaby Cuevas;  Location: [facility] GABY;  Service: Anesthesiology;  Laterality: N/A;  minimal sedation prior to MRI    RIGHT HEART CATHETERIZATION Right 5/7/2021    Procedure: INSERTION, CATHETER, RIGHT HEART;  Surgeon: Jaden Schuster MD;  Location: [facility] CATH LAB;  Service: Cardiology;  Laterality:  Right;    STENT, DRUG ELUTING, SINGLE VESSEL, CORONARY  12/26/2023    Procedure: Stent, Drug Eluting, Single Vessel, Coronary;  Surgeon: Carlos King MD;  Location: [facility] CATH LAB;  Service: Cardiology;;    TREATMENT OF CARDIAC ARRHYTHMIA N/A 5/19/2021    Procedure: CARDIOVERSION;  Surgeon: Didier Tilley MD;  Location: [facility] EP LAB;  Service: Cardiology;  Laterality: N/A;  a fib, dccv/johny, mac, dm. sscu    TREATMENT OF CARDIAC ARRHYTHMIA N/A 5/31/2021    Procedure: CARDIOVERSION;  Surgeon: Daniel Arambula MD;  Location: [facility] EP LAB;  Service: Cardiology;  Laterality: N/A;  afib, DCCV, anest., DM, 3 prep    TREATMENT OF CARDIAC ARRHYTHMIA N/A 7/7/2021    Procedure: Cardioversion or Defibrillation;  Surgeon: Didier Tilley MD;  Location: [facility] EP LAB;  Service: Cardiology;  Laterality: N/A;  AF, JOHNY (cancel if complaint), DCCV, MAC, DM, 3 Prep    TREATMENT OF CARDIAC ARRHYTHMIA N/A 7/27/2021    Procedure: Cardioversion or Defibrillation;  Surgeon: Didier Tilley MD;  Location: [facility] EP LAB;  Service: Cardiology;  Laterality: N/A;  AF, JOHNY (cx if complaint), DCCV, MAC, DM, 3 Prep    TREATMENT OF CARDIAC ARRHYTHMIA N/A 1/26/2024    Procedure: Cardioversion or Defibrillation;  Surgeon: Koko Knight MD;  Location: [facility] EP LAB;  Service: Cardiology;  Laterality: N/A;  AF, JOHNY, DCCV, MAC, DM, 3 Prep *ADENOSINE TEST* *LIVER TRANSPLANT PATIENT*       Review of patient's allergies indicates:   Allergen Reactions    Bee pollens Swelling     BEE STINGS swells body       No current facility-administered medications on file prior to encounter.     Current Outpatient Medications on File Prior to Encounter   Medication Sig    atorvastatin (LIPITOR) 80 MG tablet Take 1 tablet (80 mg total) by mouth once daily.    carvediloL (COREG) 12.5 MG tablet Take 1 tablet (12.5 mg total) by mouth 2 (two) times daily with meals.    empagliflozin (JARDIANCE) 10 mg tablet Take 1 tablet (10 mg total) by mouth once daily.    ferrous  sulfate (IRON) 325 mg (65 mg iron) Tab tablet Take 1 tablet (325 mg total) by mouth once daily.    furosemide (LASIX) 20 MG tablet Take 1 tablet (20 mg total) by mouth once daily.    insulin glargine U-100, Lantus, (LANTUS SOLOSTAR U-100 INSULIN) 100 unit/mL (3 mL) InPn pen Inject 22 Units into the skin every evening.    levETIRAcetam (KEPPRA) 500 MG Tab Take 1 tablet (500 mg total) by mouth 2 (two) times daily.    magnesium oxide (MAG-OX) 400 mg (241.3 mg magnesium) tablet Take 1 tablet (400 mg total) by mouth 2 (two) times daily. (Patient taking differently: Take 400 mg by mouth once daily.)    multivitamin capsule Take 1 capsule by mouth once daily.    omega-3 fatty acids/fish oil (FISH OIL-OMEGA-3 FATTY ACIDS) 300-1,000 mg capsule Take 1 capsule by mouth once daily.     pantoprazole (PROTONIX) 40 MG tablet Take 1 tablet (40 mg total) by mouth once daily.    pregabalin (LYRICA) 75 MG capsule Take 1 capsule (75 mg total) by mouth 2 (two) times daily.    QUEtiapine (SEROQUEL) 100 MG Tab Take 1 tablet (100 mg total) by mouth every evening.    sacubitriL-valsartan (ENTRESTO) 49-51 mg per tablet Take 1 tablet by mouth 2 (two) times daily.    apixaban (ELIQUIS) 5 mg Tab Take 1 tablet (5 mg total) by mouth 2 (two) times daily.    blood sugar diagnostic Strp To check BG 2 times daily, to use with insurance preferred meter (patient has a [employer] self-monitoring glucose meter)    blood-glucose meter kit To check BG 2 times daily, to use with insurance preferred meter    diazePAM (VALIUM) 5 MG tablet Take 1 tablet (5 mg total) by mouth as needed for Anxiety. Take 1 tab 30 min prior to MRI, take 2nd as needed    HYDROcodone-acetaminophen (NORCO) 5-325 mg per tablet Take 1 tablet by mouth every 8 (eight) hours as needed for Pain.    insulin lispro (HUMALOG KWIKPEN INSULIN) 100 unit/mL pen Inject 6 Units into the skin 3 (three) times daily with meals. Plus sliding scale, MDD: 48 units    lancets Misc To check BG 2 times  "daily, to use with insurance preferred meter    pen needle, diabetic (BD ULTRA-FINE GÉNESIS PEN NEEDLE) 32 gauge x 5/32" Ndle Use to inject insulin 4 times daily     Family History       Problem Relation (Age of Onset)    Coronary artery disease Father          Tobacco Use    Smoking status: Former     Current packs/day: 0.00     Types: Cigarettes     Quit date: 1980     Years since quittin.0    Smokeless tobacco: Former   Substance and Sexual Activity    Alcohol use: Not Currently    Drug use: Yes     Types: Marijuana     Comment: last 25    Sexual activity: Not on file     Review of Systems   All other systems reviewed and are negative.    Objective:     Vital Signs (Most Recent):  Temp: 97.4 °F (36.3 °C) (25 1315)  Pulse: 83 (25 1315)  Resp: 16 (25 1324)  BP: 96/63 (25 1315)  SpO2: (!) 94 % (25 1315) Vital Signs (24h Range):  Temp:  [97.4 °F (36.3 °C)-97.9 °F (36.6 °C)] 97.4 °F (36.3 °C)  Pulse:  [83-99] 83  Resp:  [12-20] 16  SpO2:  [92 %-100 %] 94 %  BP: ()/(47-75) 96/63       Weight: 95.3 kg (210 lb)  Body mass index is 28.48 kg/m².    SpO2: (!) 94 %        Physical Exam  HENT:      Head: Normocephalic.      Mouth/Throat:      Mouth: Mucous membranes are moist.   Eyes:      Pupils: Pupils are equal, round, and reactive to light.   Cardiovascular:      Rate and Rhythm: Normal rate and regular rhythm.      Heart sounds: Normal heart sounds, S1 normal and S2 normal.   Pulmonary:      Effort: Pulmonary effort is normal.   Abdominal:      General: Abdomen is flat.   Musculoskeletal:         General: Normal range of motion.      Right lower leg: No edema.      Left lower leg: No edema.   Skin:     General: Skin is warm.   Neurological:      Mental Status: He is alert.            Significant Labs: EP:   Recent Labs   Lab 25  0945      K 4.0      CO2 23   *   BUN 27*   CREATININE 1.1   CALCIUM 9.4   ANIONGAP 10   WBC 6.06   HGB 12.2*   HCT 39.1* "   PLT 94*       EKG pre procedure:         EKG post procedure                         Assessment and Plan:     LBBB (left bundle branch block)    Tej Purdy with severe AS s/p TAVR (2/18/2025), by Dr. Nugent. Last EF 10/2024 reported to be %65  Symptomatic severe AS with LVEF of 65% s/p TAVR with new LBBB. Recommend overnight telemetry monitoring. If LBBB persists then will plan for EPS +/-PPM.      TVP: VVI, base rate 40, threshold 1 Mv , output    PLAN:  - Continue telemetry and TVP overnight  - Repeat ECG at 5 am  - NPO at MN, for possible PPM placement in AM  - Routine Post op management per structural team    - EP team will continue to follow, please call for any questions or concerns   - Provided rhythm remains stable will plan for outpatient event monitor on discharge.  - No evidence of AV block or change in QRS. No indication for PPM at this time. Will continue to monitor.    If LBBB:  - If LBBB persists in AM, will need EP study to evaluate the need for PPM  - If LBBB resolves, will go home with 30 day event monitor          Thank you for your consult. I will follow-up with patient. Please contact us if you have any additional questions.    Manuel Mayorga MD  Cardiac Electrophysiology  [facility] - Cath Lab

## 2025-02-18 NOTE — Clinical Note
55 ml of contrast were injected throughout the case. 145 mL of contrast was the total wasted during the case. 200 mL was the total amount used during the case.

## 2025-02-18 NOTE — ANESTHESIA PROCEDURE NOTES
Arterial    Diagnosis: aortic stenosis    Patient location during procedure: done in OR    Staffing  Authorizing Provider: Keo Patel MD  Performing Provider: Solange Lebron MD    Staffing  Performed by: Solange Lebron MD  Authorized by: Keo Patel MD    Anesthesiologist was present at the time of the procedure.    Preanesthetic Checklist  Completed: patient identified, IV checked, site marked, risks and benefits discussed, surgical consent, monitors and equipment checked, pre-op evaluation, timeout performed and anesthesia consent givenArterial  Skin Prep: chlorhexidine gluconate and isopropyl alcohol  Local Infiltration: none  Orientation: right  Location: radial    Catheter Size: 20 G  Catheter placement by Ultrasound guidance. Heme positive aspiration all ports.   Vessel Caliber: small, patent, compressibility normal  Vascular Doppler:  not done  Needle advanced into vessel with real time Ultrasound guidance.Insertion Attempts: 3  Assessment  Dressing: secured with tape and tegaderm  Patient: Tolerated well

## 2025-02-18 NOTE — TRANSFER OF CARE
Anesthesia Transfer of Care Note    Patient: Tej Purdy    Procedure(s) Performed: Procedure(s) (LRB):  REPLACEMENT, AORTIC VALVE, TRANSCATHETER (TAVR) (N/A)  Cardiac Cath Cosurgeon (N/A)  REPLACEMENT, AORTIC VALVE, TRANSCATHETER (TAVR)    Patient location: PACU    Anesthesia Type: general    Transport from OR: Transported from OR on 6-10 L/min O2 by face mask with adequate spontaneous ventilation    Post pain: adequate analgesia    Post assessment: no apparent anesthetic complications    Post vital signs: stable    Level of consciousness: awake and alert    Nausea/Vomiting: no nausea/vomiting    Complications: none    Transfer of care protocol was followed      Last vitals: Visit Vitals  BP (!) 96/57 (BP Location: Left arm, Patient Position: Lying)   Pulse 99   Temp 36.6 °C (97.9 °F) (Temporal)   Resp 18   Ht 6' (1.829 m)   Wt 95.3 kg (210 lb)   SpO2 (!) 92%   BMI 28.48 kg/m²

## 2025-02-18 NOTE — NURSING
Patient admitted to CSU. Patient arrived to floor from EP PACU; patient AAO x4 at this time. Patient placed on tele. Vital signs obtained. Patient nauseous on admission IV compazine given per MAR. Plan of care initiated with patient. Bed in lowest position, locked, SR up x2, call bell in reach.

## 2025-02-18 NOTE — ANESTHESIA POSTPROCEDURE EVALUATION
Anesthesia Post Evaluation    Patient: Tej Purdy    Procedure(s) Performed: Procedure(s) (LRB):  REPLACEMENT, AORTIC VALVE, TRANSCATHETER (TAVR) (N/A)  Cardiac Cath Cosurgeon (N/A)  REPLACEMENT, AORTIC VALVE, TRANSCATHETER (TAVR)    Final Anesthesia Type: general (Natural airway)      Patient location during evaluation: PACU  Patient participation: Yes- Able to Participate  Level of consciousness: awake and alert  Post-procedure vital signs: reviewed and stable  Pain management: adequate  Airway patency: patent    PONV status at discharge: No PONV  Anesthetic complications: no      Cardiovascular status: hemodynamically stable  Respiratory status: unassisted  Hydration status: euvolemic  Follow-up not needed.              Vitals Value Taken Time   /63 02/18/25 15:00   Temp 36.3 °C (97.4 °F) 02/18/25 15:00   Pulse 66 02/18/25 15:37   Resp 16 02/18/25 15:00   SpO2 96 % 02/18/25 15:00         No case tracking events are documented in the log.      Pain/Cordelia Score: Pain Rating Prior to Med Admin: 3 (2/18/2025 12:23 PM)  Pain Rating Post Med Admin: 3 (2/18/2025  1:30 PM)  Cordelia Score: 9 (2/18/2025 12:45 PM)

## 2025-02-18 NOTE — BRIEF OP NOTE
Brief Operative Note:    : Shilo Carrasco MD     Referring Physician: Shilo Carrasco     All Operators: Surgeon(s):  Alberto Vicente MD Subramaniam, Venkat, MD Tafur Soto, Jose D., MD  , MD Jovan     Preoperative Diagnosis: Severe aortic stenosis [I35.0]     Postop Diagnosis: Severe aortic stenosis [I35.0]    Treatments/Procedures: Procedure(s) (LRB):  REPLACEMENT, AORTIC VALVE, TRANSCATHETER (TAVR) (N/A)  Cardiac Cath Cosurgeon (N/A)  REPLACEMENT, AORTIC VALVE, TRANSCATHETER (TAVR)    Access: Right CFA, Left radial artery    Findings:Severe structural heart disease is present.     See catheterization report for full details.    Intervention:    See catheterization report for full details.    Closure device: Vascband, Perclose       Plan:  - Post cath protocol   - IVF @ 125 cc/kg/hr x 2 hours  - transfer to step down  - Bed rest x 2 hours   - Continue aspirin 81 mg daily indefinitely  - restart apixban tomorrow for AF  - Continue high intensity statin therapy (LDL goal < 70)  - Risk factor reduction (BP <130/80 mmHg, glycemic control, etc)  - Cardiac rehab referral  - Follow up with outpatient cardiologist    Estimated Blood loss: 20 cc    Specimens removed: No    Jovan Laird MD

## 2025-02-18 NOTE — SUBJECTIVE & OBJECTIVE
Past Medical History:   Diagnosis Date    Acute appendicitis 06/02/2023    Aortic stenosis     Atrial fibrillation     johny/cardioversion on 1/26/24    Calculus of ureter 12/22/2023    CHF (congestive heart failure)     Cholangiocarcinoma 03/16/2022    s/p liver transplant    Cirrhosis 11/23/2020    s/p transplant 2022 for CASTILLO cirrhosis and cholangiocarcinoma    Diabetes mellitus, type 2     c/b Diabetic neuropathy    HTN (hypertension) 11/23/2020    Hyperlipidemia 11/23/2020    Kidney stones 11/23/2020    S/p lithotripsy 2020    PAD (peripheral artery disease)     Skin cancer 11/23/2020       Past Surgical History:   Procedure Laterality Date    ANGIOGRAM, CORONARY, WITH LEFT HEART CATHETERIZATION N/A 12/26/2023    Procedure: Angiogram, Coronary, with Left Heart Cath;  Surgeon: Carlos King MD;  Location: [facility] CATH LAB;  Service: Cardiology;  Laterality: N/A;    AORTIC VALVULOPLASTY N/A 2/1/2024    Procedure: REPAIR, AORTIC VALVE;  Surgeon: Shilo Carrasco MD;  Location: [facility] CATH LAB;  Service: Cardiology;  Laterality: N/A;    COLONOSCOPY  10/2020    ECHOCARDIOGRAM,TRANSESOPHAGEAL N/A 1/26/2024    Procedure: Transesophageal echo (JOHNY) intra-procedure log documentation;  Surgeon: Nick Mathur III, MD;  Location: [facility] EP LAB;  Service: Cardiology;  Laterality: N/A;    ESOPHAGEAL MANOMETRY WITH MEASUREMENT OF IMPEDANCE N/A 7/17/2023    Procedure: MANOMETRY, ESOPHAGUS, WITH IMPEDANCE MEASUREMENT;  Surgeon: Klarissa Zamora MD;  Location: [facility] ENDO (4TH FLR);  Service: Gastroenterology;  Laterality: N/A;  pt diabetic  liver txp  prep insructions sent to pt via email and portal -    ESOPHAGOGASTRODUODENOSCOPY  10/2020    ESOPHAGOGASTRODUODENOSCOPY N/A 7/1/2021    Procedure: EGD (ESOPHAGOGASTRODUODENOSCOPY);  Surgeon: Jovan David MD;  Location: [facility] ENDO (4TH FLR);  Service: Endoscopy;  Laterality: N/A;  HCC. Listed for liver transplant. EGD for variceal surveillance.  cardiac clearance and  blood thinenr approval received, see telephone encounter 6/23/21-BB  fully vaccinated-BB  labs same day of procedure-BB    EXCISION OF HYDROCELE Right     hemorroid surgery      hemorroidectomy      KIDNEY STONE SURGERY      LAPAROSCOPIC APPENDECTOMY N/A 6/2/2023    Procedure: APPENDECTOMY, LAPAROSCOPIC;  Surgeon: Shan Ross MD;  [facility];  Service: General;  Laterality: N/A;    LEFT HEART CATHETERIZATION N/A 1/27/2021    Procedure: Left heart cath;  Surgeon: Kris Shelton MD;  Location: [facility] CATH LAB;  Service: Cardiology;  Laterality: N/A;    liver mass removal      LIVER TRANSPLANT N/A 1/6/2022    Procedure: TRANSPLANT, LIVER;  Surgeon: Lizet Garcia MD;  Location: [facility] OR [facility] FLR;  Service: Transplant;  Laterality: N/A;    MAGNETIC RESONANCE IMAGING N/A 1/13/2025    Procedure: MRI (Magnetic Resonance Imagine);  Surgeon: Gaby Cuevas;  Location: [facility];  Service: Anesthesiology;  Laterality: N/A;  minimal sedation prior to MRI    RIGHT HEART CATHETERIZATION Right 5/7/2021    Procedure: INSERTION, CATHETER, RIGHT HEART;  Surgeon: Jaden Schuster MD;  Location: [facility] CATH LAB;  Service: Cardiology;  Laterality: Right;    STENT, DRUG ELUTING, SINGLE VESSEL, CORONARY  12/26/2023    Procedure: Stent, Drug Eluting, Single Vessel, Coronary;  Surgeon: Carlos King MD;  Location: [facility] CATH LAB;  Service: Cardiology;;    TREATMENT OF CARDIAC ARRHYTHMIA N/A 5/19/2021    Procedure: CARDIOVERSION;  Surgeon: Didier Tilley MD;  Location: [facility] EP LAB;  Service: Cardiology;  Laterality: N/A;  a fib, dccv/johny, mac, dm. sscu    TREATMENT OF CARDIAC ARRHYTHMIA N/A 5/31/2021    Procedure: CARDIOVERSION;  Surgeon: Daniel Arambula MD;  Location: [facility] EP LAB;  Service: Cardiology;  Laterality: N/A;  afib, DCCV, anest., DM, 3 prep    TREATMENT OF CARDIAC ARRHYTHMIA N/A 7/7/2021    Procedure: Cardioversion or Defibrillation;  Surgeon: Didier Tilley MD;  Location: [facility] EP LAB;  Service:  Cardiology;  Laterality: N/A;  AF, ASHISH (cancel if complaint), DCCV, MAC, DM, 3 Prep    TREATMENT OF CARDIAC ARRHYTHMIA N/A 7/27/2021    Procedure: Cardioversion or Defibrillation;  Surgeon: Didier Tilley MD;  Location: [facility] EP LAB;  Service: Cardiology;  Laterality: N/A;  AF, ASHISH (cx if complaint), DCCV, MAC, DM, 3 Prep    TREATMENT OF CARDIAC ARRHYTHMIA N/A 1/26/2024    Procedure: Cardioversion or Defibrillation;  Surgeon: Koko Knight MD;  Location: [facility] EP LAB;  Service: Cardiology;  Laterality: N/A;  AF, ASHISH, DCCV, MAC, DM, 3 Prep *ADENOSINE TEST* *LIVER TRANSPLANT PATIENT*       Review of patient's allergies indicates:   Allergen Reactions    Bee pollens Swelling     BEE STINGS swells body       No current facility-administered medications on file prior to encounter.     Current Outpatient Medications on File Prior to Encounter   Medication Sig    atorvastatin (LIPITOR) 80 MG tablet Take 1 tablet (80 mg total) by mouth once daily.    carvediloL (COREG) 12.5 MG tablet Take 1 tablet (12.5 mg total) by mouth 2 (two) times daily with meals.    empagliflozin (JARDIANCE) 10 mg tablet Take 1 tablet (10 mg total) by mouth once daily.    ferrous sulfate (IRON) 325 mg (65 mg iron) Tab tablet Take 1 tablet (325 mg total) by mouth once daily.    furosemide (LASIX) 20 MG tablet Take 1 tablet (20 mg total) by mouth once daily.    insulin glargine U-100, Lantus, (LANTUS SOLOSTAR U-100 INSULIN) 100 unit/mL (3 mL) InPn pen Inject 22 Units into the skin every evening.    levETIRAcetam (KEPPRA) 500 MG Tab Take 1 tablet (500 mg total) by mouth 2 (two) times daily.    magnesium oxide (MAG-OX) 400 mg (241.3 mg magnesium) tablet Take 1 tablet (400 mg total) by mouth 2 (two) times daily. (Patient taking differently: Take 400 mg by mouth once daily.)    multivitamin capsule Take 1 capsule by mouth once daily.    omega-3 fatty acids/fish oil (FISH OIL-OMEGA-3 FATTY ACIDS) 300-1,000 mg capsule Take 1 capsule by mouth once daily.      "pantoprazole (PROTONIX) 40 MG tablet Take 1 tablet (40 mg total) by mouth once daily.    pregabalin (LYRICA) 75 MG capsule Take 1 capsule (75 mg total) by mouth 2 (two) times daily.    QUEtiapine (SEROQUEL) 100 MG Tab Take 1 tablet (100 mg total) by mouth every evening.    sacubitriL-valsartan (ENTRESTO) 49-51 mg per tablet Take 1 tablet by mouth 2 (two) times daily.    apixaban (ELIQUIS) 5 mg Tab Take 1 tablet (5 mg total) by mouth 2 (two) times daily.    blood sugar diagnostic Strp To check BG 2 times daily, to use with insurance preferred meter (patient has a [employer] self-monitoring glucose meter)    blood-glucose meter kit To check BG 2 times daily, to use with insurance preferred meter    diazePAM (VALIUM) 5 MG tablet Take 1 tablet (5 mg total) by mouth as needed for Anxiety. Take 1 tab 30 min prior to MRI, take 2nd as needed    HYDROcodone-acetaminophen (NORCO) 5-325 mg per tablet Take 1 tablet by mouth every 8 (eight) hours as needed for Pain.    insulin lispro (HUMALOG KWIKPEN INSULIN) 100 unit/mL pen Inject 6 Units into the skin 3 (three) times daily with meals. Plus sliding scale, MDD: 48 units    lancets Misc To check BG 2 times daily, to use with insurance preferred meter    pen needle, diabetic (BD ULTRA-FINE GÉNESIS PEN NEEDLE) 32 gauge x 5/32" Ndle Use to inject insulin 4 times daily     Family History       Problem Relation (Age of Onset)    Coronary artery disease Father          Tobacco Use    Smoking status: Former     Current packs/day: 0.00     Types: Cigarettes     Quit date: 1980     Years since quittin.0    Smokeless tobacco: Former   Substance and Sexual Activity    Alcohol use: Not Currently    Drug use: Yes     Types: Marijuana     Comment: last 25    Sexual activity: Not on file     Review of Systems   All other systems reviewed and are negative.    Objective:     Vital Signs (Most Recent):  Temp: 97.4 °F (36.3 °C) (25 1315)  Pulse: 83 (25 1315)  Resp: 16 " (02/18/25 1324)  BP: 96/63 (02/18/25 1315)  SpO2: (!) 94 % (02/18/25 1315) Vital Signs (24h Range):  Temp:  [97.4 °F (36.3 °C)-97.9 °F (36.6 °C)] 97.4 °F (36.3 °C)  Pulse:  [83-99] 83  Resp:  [12-20] 16  SpO2:  [92 %-100 %] 94 %  BP: ()/(47-75) 96/63       Weight: 95.3 kg (210 lb)  Body mass index is 28.48 kg/m².    SpO2: (!) 94 %        Physical Exam  HENT:      Head: Normocephalic.      Mouth/Throat:      Mouth: Mucous membranes are moist.   Eyes:      Pupils: Pupils are equal, round, and reactive to light.   Cardiovascular:      Rate and Rhythm: Normal rate and regular rhythm.      Heart sounds: Normal heart sounds, S1 normal and S2 normal.   Pulmonary:      Effort: Pulmonary effort is normal.   Abdominal:      General: Abdomen is flat.   Musculoskeletal:         General: Normal range of motion.      Right lower leg: No edema.      Left lower leg: No edema.   Skin:     General: Skin is warm.   Neurological:      Mental Status: He is alert.            Significant Labs: EP:   Recent Labs   Lab 02/18/25  0945      K 4.0      CO2 23   *   BUN 27*   CREATININE 1.1   CALCIUM 9.4   ANIONGAP 10   WBC 6.06   HGB 12.2*   HCT 39.1*   PLT 94*       EKG pre procedure:         EKG post procedure

## 2025-02-18 NOTE — PROGRESS NOTES
Dr martinez cath lab fellow at bedside. Pt aaox4 follows commands. Per md, ok for pt to go to csu room.

## 2025-02-18 NOTE — PLAN OF CARE
Problem: Adult Inpatient Plan of Care  Goal: Patient-Specific Goal (Individualized)  Outcome: Progressing  Flowsheets (Taken 2/18/2025 1724)  Individualized Care Needs: updated patient on POC  Anxieties, Fears or Concerns: none stated     Problem: Diabetes Comorbidity  Goal: Blood Glucose Level Within Targeted Range  Outcome: Progressing  Intervention: Monitor and Manage Glycemia  Flowsheets (Taken 2/18/2025 1724)  Glycemic Management: blood glucose monitored     Problem: Fall Injury Risk  Goal: Absence of Fall and Fall-Related Injury  Outcome: Progressing  Intervention: Promote Injury-Free Environment  Flowsheets (Taken 2/18/2025 1724)  Safety Promotion/Fall Prevention:   assistive device/personal item within reach   side rails raised x 2   room near unit station   patient expresses understanding of fall risk and prevention   nonskid shoes/socks when out of bed   medications reviewed   instructed to call staff for mobility   Fall Risk signage in place   Fall Risk reviewed with patient/family   AAOX4,VSS, Plan of care discussed with patient. Patient has no complaints of chest pain/SOB/palpitations. Pt ambulating  with standby assist, fall precautions in place,no falls/injuries through the shift.Discussed medications and care.Patient has no questions at this time.Pt resting comfortably with no acute distress.Call light within reach,bed at lowest position. R groin site CDI. L wrist CDI. No signs of hematoma. Pulses palpable. No complaints of numbness and tingling.

## 2025-02-18 NOTE — NURSING TRANSFER
Nursing Transfer Note      2/18/2025   2:10 PM    Nurse giving handoff:radha bonilla   Nurse receiving handoff:river csu rn    Reason patient is being transferred: ep pacu 2 to csu 358    Transfer To: ep pacu 2 to csu 358    Transfer via stretcher    Transfer with cardiac monitoring, tele box on confirmed by tele tech    Transported by radha bonilla    Transfer Vital Signs:  Blood Pressure:102/61  Heart Rate:79  O2:96% room air  Temperature:97.4  Respirations:16    Telemetry: Box Number [id number], Rate 75, Rhythm sr with bbb, and Telemetry  EKG tech  Order for Tele Monitor? Yes    Additional Lines: right sc tvp/cordis    Medicines sent: ns at 125ml/hr iv x2hrs.  Ns at 10ml/hr to right sc cordis pt will need all po meds ordered per md order. See mar. Pt was nauseous. Csu rn to give when ready.     Any special needs or follow-up needed: bedrest x2hrs. Hemostasis 1139. Bedrest done at 1339.  Vasband placed at 1146 left radial 12ml. Removal time per protocol at 1346.    Patient belongings transferred with patient: Yes belonging bags x2    Chart send with patient: Yes    Notified: spouse    Patient reassessed at: 2/18/25 1330. Next due 1400  Upon arrival to floor: cardiac monitor applied, patient oriented to room, call bell in reach, and bed in lowest position, groin check done with csu rn and left vasband and right sc tvp/cordis. Tvp settings reviewed.

## 2025-02-18 NOTE — HPI
69 y.o. male with HTN, HLD, T2DM on insulin, paroxysmal Afib on eliquis, PAD, hx HCC s/p liver transplant 1/2022 and RFA/TACE 12/20, HFrEF now recovered to 60-65%, severe AS s/p BAV 2/1/24 who presents for TAVR.      Patient reports having significant symptom improvement and more energy after BAV and GDMT optimization. Recent TTE 10/14/24 shows recovered EF of 60-65% with high-gradient severe AS-- CHRIS 0.7, MG 47.3, peak chandra 4.6. He endorses being able to walk around with use of his cane pretty well. No symptoms of dyspnea on exertion or angina. He denies leg swelling, orthopnea, pnd, syncope. He is still on plavix from stent placement in 12/2024. On eliquis for afib.      He was in the hospital recently with a fall. Neurological workup was unremarkable.           The patient has undergone the following TAVR work-up:   ? ECHO (Date 10.14.24): CHRIS= 0.7 cm2, MG= 46mmHg, Peak Chandra= 4.7 m/s, EF= 55%.   ? LHC (Date 12.23): S/P PCI to [facility]MB  ? STS: 2%   ? Frailty: 2/4   ? Iliacs are >7    ? LVOT area by CTA is 5.6 cm2 (30 mm X 25 mm) and Avg Diameter is 26.7 per my independent review of images on [employer].  ? Incidental findings on CT: Non contributory  ? CT Surgery risk assessment:  Dr. Craig note; high risk for SAVR  ? Rhythm issues: Afib  ? KCCQ/5 meter walk: Done  ? Comorbidities: Cirrhosis, CAD

## 2025-02-18 NOTE — H&P
[facility] - Short Stay Cardiac Unit  Interventional Cardiology  H&P    Patient Name: Tej Purdy  MRN: [id number]  Admission Date: 2/18/2025  Code Status: Prior   Attending Provider: Shilo Carrasco MD   Primary Care Physician: Kole Sharpe MD  Principal Problem:<principal problem not specified>    Patient information was obtained from patient and ER records.     Subjective:        HPI:  69 y.o. male with HTN, HLD, T2DM on insulin, paroxysmal Afib on eliquis, PAD, hx HCC s/p liver transplant 1/2022 and RFA/TACE 12/20, HFrEF now recovered to 60-65%, severe AS s/p BAV 2/1/24 who presents for TAVR.      Patient reports having significant symptom improvement and more energy after BAV and GDMT optimization. Recent TTE 10/14/24 shows recovered EF of 60-65% with high-gradient severe AS-- CHRIS 0.7, MG 47.3, peak chandra 4.6. He endorses being able to walk around with use of his cane pretty well. No symptoms of dyspnea on exertion or angina. He denies leg swelling, orthopnea, pnd, syncope. He is still on plavix from stent placement in 12/2024. On eliquis for afib.      He was in the hospital recently with a fall. Neurological workup was unremarkable.           The patient has undergone the following TAVR work-up:   ? ECHO (Date 10.14.24): CHRIS= 0.7 cm2, MG= 46mmHg, Peak Chandra= 4.7 m/s, EF= 55%.   ? LHC (Date 12.23): S/P PCI to OMB  ? STS: 2%   ? Frailty: 2/4   ? Iliacs are >7    ? LVOT area by CTA is 5.6 cm2 (30 mm X 25 mm) and Avg Diameter is 26.7 per my independent review of images on [employer].  ? Incidental findings on CT: Non contributory  ? CT Surgery risk assessment:  Dr. Craig note; high risk for SAVR  ? Rhythm issues: Afib  ? KCCQ/5 meter walk: Done  ? Comorbidities: Cirrhosis, CAD        Past Medical History:   Diagnosis Date    Acute appendicitis 06/02/2023    Aortic stenosis     Atrial fibrillation     johny/cardioversion on 1/26/24    Calculus of ureter 12/22/2023    CHF (congestive heart failure)      Cholangiocarcinoma 03/16/2022    s/p liver transplant    Cirrhosis 11/23/2020    s/p transplant 2022 for CASTILLO cirrhosis and cholangiocarcinoma    Diabetes mellitus, type 2     c/b Diabetic neuropathy    HTN (hypertension) 11/23/2020    Hyperlipidemia 11/23/2020    Kidney stones 11/23/2020    S/p lithotripsy 2020    PAD (peripheral artery disease)     Skin cancer 11/23/2020       Past Surgical History:   Procedure Laterality Date    ANGIOGRAM, CORONARY, WITH LEFT HEART CATHETERIZATION N/A 12/26/2023    Procedure: Angiogram, Coronary, with Left Heart Cath;  Surgeon: Carlos King MD;  Location: [facility] CATH LAB;  Service: Cardiology;  Laterality: N/A;    AORTIC VALVULOPLASTY N/A 2/1/2024    Procedure: REPAIR, AORTIC VALVE;  Surgeon: Shilo Carrasco MD;  Location: [facility] CATH LAB;  Service: Cardiology;  Laterality: N/A;    COLONOSCOPY  10/2020    ECHOCARDIOGRAM,TRANSESOPHAGEAL N/A 1/26/2024    Procedure: Transesophageal echo (ASHISH) intra-procedure log documentation;  Surgeon: Nick Mathur III, MD;  Location: [facility] EP LAB;  Service: Cardiology;  Laterality: N/A;    ESOPHAGEAL MANOMETRY WITH MEASUREMENT OF IMPEDANCE N/A 7/17/2023    Procedure: MANOMETRY, ESOPHAGUS, WITH IMPEDANCE MEASUREMENT;  Surgeon: Klarissa Zamora MD;  Location: [facility] ENDO (4TH FLR);  Service: Gastroenterology;  Laterality: N/A;  pt diabetic  liver txp  prep insructions sent to pt via email and portal -    ESOPHAGOGASTRODUODENOSCOPY  10/2020    ESOPHAGOGASTRODUODENOSCOPY N/A 7/1/2021    Procedure: EGD (ESOPHAGOGASTRODUODENOSCOPY);  Surgeon: Jovan David MD;  Location: [facility] ENDO (4TH FLR);  Service: Endoscopy;  Laterality: N/A;  HCC. Listed for liver transplant. EGD for variceal surveillance.  cardiac clearance and blood thinenr approval received, see telephone encounter 6/23/21-BB  fully vaccinated-BB  labs same day of procedure-BB    EXCISION OF HYDROCELE Right     hemorroid surgery      hemorroidectomy      KIDNEY STONE SURGERY       LAPAROSCOPIC APPENDECTOMY N/A 6/2/2023    Procedure: APPENDECTOMY, LAPAROSCOPIC;  Surgeon: Shan Ross MD;  Location: [facility] OR [facility] FLR;  Service: General;  Laterality: N/A;    LEFT HEART CATHETERIZATION N/A 1/27/2021    Procedure: Left heart cath;  Surgeon: Kris Shelton MD;  Location: [facility] CATH LAB;  Service: Cardiology;  Laterality: N/A;    liver mass removal      LIVER TRANSPLANT N/A 1/6/2022    Procedure: TRANSPLANT, LIVER;  Surgeon: Lizet Garcia MD;  Location: [facility] OR [facility] FLR;  Service: Transplant;  Laterality: N/A;    MAGNETIC RESONANCE IMAGING N/A 1/13/2025    Procedure: MRI (Magnetic Resonance Imagine);  Surgeon: Gaby Cuevas;  Location: [facility] GABY;  Service: Anesthesiology;  Laterality: N/A;  minimal sedation prior to MRI    RIGHT HEART CATHETERIZATION Right 5/7/2021    Procedure: INSERTION, CATHETER, RIGHT HEART;  Surgeon: Jaden Schuster MD;  Location: [facility] CATH LAB;  Service: Cardiology;  Laterality: Right;    STENT, DRUG ELUTING, SINGLE VESSEL, CORONARY  12/26/2023    Procedure: Stent, Drug Eluting, Single Vessel, Coronary;  Surgeon: Carlos King MD;  Location: [facility] CATH LAB;  Service: Cardiology;;    TREATMENT OF CARDIAC ARRHYTHMIA N/A 5/19/2021    Procedure: CARDIOVERSION;  Surgeon: Didier Tilley MD;  Location: [facility] EP LAB;  Service: Cardiology;  Laterality: N/A;  a fib, dccv/johny, mac, dm. sscu    TREATMENT OF CARDIAC ARRHYTHMIA N/A 5/31/2021    Procedure: CARDIOVERSION;  Surgeon: Daniel Arambula MD;  Location: [facility] EP LAB;  Service: Cardiology;  Laterality: N/A;  afib, DCCV, anest., DM, 3 prep    TREATMENT OF CARDIAC ARRHYTHMIA N/A 7/7/2021    Procedure: Cardioversion or Defibrillation;  Surgeon: Didier Tilley MD;  Location: [facility] EP LAB;  Service: Cardiology;  Laterality: N/A;  AF, JOHNY (cancel if complaint), DCCV, MAC, DM, 3 Prep    TREATMENT OF CARDIAC ARRHYTHMIA N/A 7/27/2021    Procedure: Cardioversion or Defibrillation;  Surgeon: Didier Tilley MD;  Location: [facility]  EP LAB;  Service: Cardiology;  Laterality: N/A;  AF, ASHISH (cx if complaint), DCCV, MAC, DM, 3 Prep    TREATMENT OF CARDIAC ARRHYTHMIA N/A 1/26/2024    Procedure: Cardioversion or Defibrillation;  Surgeon: Koko Knight MD;  Location: [facility] EP LAB;  Service: Cardiology;  Laterality: N/A;  AF, ASHISH, DCCV, MAC, DM, 3 Prep *ADENOSINE TEST* *LIVER TRANSPLANT PATIENT*       Review of patient's allergies indicates:   Allergen Reactions    Bee pollens Swelling     BEE STINGS swells body       Facility-Administered Medications Prior to Admission   Medication    sodium chloride 0.9% flush 10 mL     PTA Medications   Medication Sig    apixaban (ELIQUIS) 5 mg Tab Take 1 tablet (5 mg total) by mouth 2 (two) times daily.    atorvastatin (LIPITOR) 80 MG tablet Take 1 tablet (80 mg total) by mouth once daily.    blood sugar diagnostic Strp To check BG 2 times daily, to use with insurance preferred meter (patient has a [employer] self-monitoring glucose meter)    blood-glucose meter kit To check BG 2 times daily, to use with insurance preferred meter    buPROPion (WELLBUTRIN) 75 MG tablet Take 75 mg by mouth Daily. In the morning    carvediloL (COREG) 12.5 MG tablet Take 1 tablet (12.5 mg total) by mouth 2 (two) times daily with meals.    cycloSPORINE modified, NEORAL, (NEORAL) 25 MG capsule Take 2 capsules (50 mg total) by mouth every morning AND 1 capsule (25 mg total) every evening.    diazePAM (VALIUM) 5 MG tablet Take 1 tablet (5 mg total) by mouth as needed for Anxiety. Take 1 tab 30 min prior to MRI, take 2nd as needed    empagliflozin (JARDIANCE) 10 mg tablet Take 1 tablet (10 mg total) by mouth once daily.    ferrous sulfate (IRON) 325 mg (65 mg iron) Tab tablet Take 1 tablet (325 mg total) by mouth once daily.    furosemide (LASIX) 20 MG tablet Take 1 tablet (20 mg total) by mouth once daily.    HYDROcodone-acetaminophen (NORCO) 5-325 mg per tablet Take 1 tablet by mouth every 8 (eight) hours as needed for Pain.    insulin  "glargine U-100, Lantus, (LANTUS SOLOSTAR U-100 INSULIN) 100 unit/mL (3 mL) InPn pen Inject 22 Units into the skin every evening.    insulin lispro (HUMALOG KWIKPEN INSULIN) 100 unit/mL pen Inject 6 Units into the skin 3 (three) times daily with meals. Plus sliding scale, MDD: 48 units    lancets Misc To check BG 2 times daily, to use with insurance preferred meter    levETIRAcetam (KEPPRA) 500 MG Tab Take 1 tablet (500 mg total) by mouth 2 (two) times daily.    magnesium oxide (MAG-OX) 400 mg (241.3 mg magnesium) tablet Take 1 tablet (400 mg total) by mouth 2 (two) times daily.    multivitamin capsule Take 1 capsule by mouth once daily.    mycophenolate (CELLCEPT) 250 mg Cap Take 2 capsules (500 mg total) by mouth 2 (two) times daily.    omega-3 fatty acids/fish oil (FISH OIL-OMEGA-3 FATTY ACIDS) 300-1,000 mg capsule Take 1 capsule by mouth once daily.     pantoprazole (PROTONIX) 40 MG tablet Take 1 tablet (40 mg total) by mouth once daily.    pen needle, diabetic (BD ULTRA-FINE GÉNESIS PEN NEEDLE) 32 gauge x 5/32" Ndle Use to inject insulin 4 times daily    pregabalin (LYRICA) 75 MG capsule Take 1 capsule (75 mg total) by mouth 2 (two) times daily.    QUEtiapine (SEROQUEL) 100 MG Tab Take 1 tablet (100 mg total) by mouth every evening.    sacubitriL-valsartan (ENTRESTO) 49-51 mg per tablet Take 1 tablet by mouth 2 (two) times daily.     Family History       Problem Relation (Age of Onset)    Coronary artery disease Father          Tobacco Use    Smoking status: Former     Current packs/day: 0.00     Types: Cigarettes     Quit date: 1980     Years since quittin.0    Smokeless tobacco: Former   Substance and Sexual Activity    Alcohol use: Not Currently    Drug use: Never    Sexual activity: Not on file     Review of Systems   Constitutional: Negative for chills, diaphoresis and night sweats.   Cardiovascular:  Positive for dyspnea on exertion. Negative for chest pain, irregular heartbeat, leg swelling, " "near-syncope and palpitations.   Respiratory:  Positive for shortness of breath. Negative for cough and wheezing.    Skin:  Negative for color change and dry skin.   Musculoskeletal:  Negative for joint pain and joint swelling.   Gastrointestinal:  Positive for bloating. Negative for abdominal pain, diarrhea, nausea and vomiting.   Genitourinary:  Negative for dysuria.   Neurological:  Positive for weakness. Negative for dizziness, headaches and light-headedness.   Psychiatric/Behavioral:          Upset   All other systems reviewed and are negative.    Objective:     Vital Signs (Most Recent):    Vital Signs (24h Range):           There is no height or weight on file to calculate BMI.            No intake or output data in the 24 hours ending 02/18/25 0831    Lines/Drains/Airways       None                    Physical Exam  Constitutional:       Appearance: Normal appearance.   HENT:      Mouth/Throat:      Mouth: Mucous membranes are moist.   Eyes:      Extraocular Movements: Extraocular movements intact.   Cardiovascular:      Rate and Rhythm: Normal rate and regular rhythm.      Heart sounds: Murmur heard.      Harsh midsystolic murmur is present with a grade of 4/6 at the upper right sternal border radiating to the neck.   Pulmonary:      Effort: Pulmonary effort is normal.   Abdominal:      Palpations: Abdomen is soft.   Musculoskeletal:      Right lower leg: No edema.      Left lower leg: No edema.   Skin:     General: Skin is warm.   Neurological:      Mental Status: He is alert and oriented to person, place, and time.            Significant Labs: ABG: No results for input(s): "PH", "PCO2", "HCO3", "POCSATURATED", "BE" in the last 48 hours., Blood Culture: No results for input(s): "LABBLOO" in the last 48 hours., BMP: No results for input(s): "GLU", "NA", "K", "CL", "CO2", "BUN", "CREATININE", "CALCIUM", "MG" in the last 48 hours., CMP No results for input(s): "NA", "K", "CL", "CO2", "GLU", "BUN", " ""CREATININE", "CALCIUM", "PROT", "ALBUMIN", "BILITOT", "ALKPHOS", "AST", "ALT", "ANIONGAP", "ESTGFRAFRICA", "EGFRNONAA" in the last 48 hours., CBC No results for input(s): "WBC", "HGB", "HCT", "PLT" in the last 48 hours., INR No results for input(s): "INR", "PROTIME" in the last 48 hours., Lipid Panel No results for input(s): "CHOL", "HDL", "LDLCALC", "TRIG", "CHOLHDL" in the last 48 hours.,   Pathology Results  (Last 10 years)                 06/02/23 1248  Specimen to Pathology, Surgery General Surgery Final result    Narrative:  Pre-op Diagnosis: Acute appendicitis, unspecified acute   appendicitis type [K35.80]   Procedure(s):   APPENDECTOMY, LAPAROSCOPIC   Number of specimens: 1   Name of specimens: 1. Appendix, permanent   Which provider would you like to cc?->GARLAND MILLER   Release to patient->Immediate   Specimen total (fresh, frozen, permanent):->1       01/06/22 1226  Specimen to Pathology, Surgery Liver Final result    Narrative:  Pre-op Diagnosis: End stage liver disease [K72.10]   Procedure(s):   TRANSPLANT, LIVER   Number of specimens: 2   Name of specimens:   1. Donor liver biopsy (permanent)   2. Old liver (permanent)   Specimen total (fresh, frozen, permanent):->2       07/01/21 0842  Specimen to Pathology, Surgery Gastrointestinal tract Final result    Narrative:  Pre-op Diagnosis: Screening [Z13.9]   Liver cirrhosis secondary to CASTILLO [K75.81, K74.60]   HCC (hepatocellular carcinoma) [C22.0]   Procedure(s):   EGD (ESOPHAGOGASTRODUODENOSCOPY)   Number of specimens: 2   Name of specimens:   Jar 1: r/o Aragon's esophagus at 45 cm   Jar 2: r/o Aragon's esophagus at 43 cm   Release to patient->Immediate   Specimen total (fresh, frozen, permanent):->2       12/09/20 1105  Specimen to Pathology, Dermatology Final result    Narrative:  Number of Specimens:->1   ------------------------->-------------------------   Spec 1 Procedure:->Biopsy   Spec 1 Clinical Impression:->ISK vs MM vs other   Spec 1 " "Source:->L inguinal fold           , and Troponin No results for input(s): "TROPONINIHS" in the last 48 hours.     Assessment and Plan:     Cardiac/Vascular  Aortic stenosis  69 y.o. male referred by Dr Estevez for evaluation of severe AS (NYHA Class II sx).     The patient has undergone the following TAVR work-up:   ECHO (Date 10.14.24): CHRIS= 0.7 cm2, MG= 46mmHg, Peak Chandra= 4.7 m/s, EF= 55%.   LHC (Date 12.23): S/P PCI to OMB  STS: 2%   Frailty: 2/4   Iliacs are >7    LVOT area by CTA is 5.6 cm2 (30 mm X 25 mm) and Avg Diameter is 26.7 per my independent review of images on [employer].  Incidental findings on CT: Non contributory  CT Surgery risk assessment:  Dr. Carig note  Rhythm issues: Afib  KCCQ/5 meter walk: Done  Comorbidities: Cirrhosis, CAD        Tej Purdy is a 34 mm  Evolut valve candidate via RTF access.     Transcatheter Aortic valve replacement   Valve: 34 Evolut  ECMO:  On Call  TAVR access: RTF  Valvuloplasty balloon: None  Viabahn size if needed: 8X40  Antithrombotic therapy: ASA/Plavix  Temporary pacing wire: No, Will pace from TAVR wire  Pacemaker risk factors: LAFB   EP Consult ordered if post procedure arrhythmias develop  Post op destination: Stepdown  To be done during procedure:  Antibiotics given  CT Surgery present in lab for valve deployment per standard of care.  Surgeon :  Heart failure on admission?  CONSENTING:  The patient was told that the procedure carries around a 12.5% risk of permanent pacemaker requirement and that risk depends on the patients underlying conduction system as described in item 8.   Prior to the [facility] visit, all available records from referring provider were reviewed.  I have personally reviewed all the lab tests available related to the patient's case  The patient's images were reviewed by myself and the procedural planning was done with my own interpretation of the iliac and aortic anatomy based on CTA, angiography or ASHISH. I have reviewed all other " imaging studies relevant to the patient including echocardiography and coronary angiography.  Patient was educated abut the pathophysiology and natural history of severe aortic stenosis and was educated about the treatment options including surgical and transcatheter valve replacement. She agrees to be full code for the forseable future and to undergo workup for treatment of severe AS.   The risks, benefits, and alternatives of transcatheter aortic valve replacement were discussed with the patient. All questions were answered and informed consent was obtained. I had a detailed discussion with the patient regarding risk of stroke, MI, bleeding access site complications including limb loss, allergy, kidney failure including dialysis and death.  The patient understands the risks and benefits and wishes to go ahead with the procedure.  The referring Cardiologist was notified of the plan  All patient's questions were answered     Shared Decision Making:  This patient was presented to a multidisciplinary heart team involving both a Structural Heart Specialist (Interventional Cardiologist) and a Cardiothoracic Surgeon. The patient was involved in shared decision-making to define clearer goals for treatment and align health decisions with their current values.  The patient understands the risks and expected benefits of their treatment options.        VTE Risk Mitigation (From admission, onward)      None              Shaina Eli MD  Interventional Cardiology   [facility] - Short Stay Cardiac Unit

## 2025-02-18 NOTE — SUBJECTIVE & OBJECTIVE
Past Medical History:   Diagnosis Date    Acute appendicitis 06/02/2023    Aortic stenosis     Atrial fibrillation     johny/cardioversion on 1/26/24    Calculus of ureter 12/22/2023    CHF (congestive heart failure)     Cholangiocarcinoma 03/16/2022    s/p liver transplant    Cirrhosis 11/23/2020    s/p transplant 2022 for CASTILLO cirrhosis and cholangiocarcinoma    Diabetes mellitus, type 2     c/b Diabetic neuropathy    HTN (hypertension) 11/23/2020    Hyperlipidemia 11/23/2020    Kidney stones 11/23/2020    S/p lithotripsy 2020    PAD (peripheral artery disease)     Skin cancer 11/23/2020       Past Surgical History:   Procedure Laterality Date    ANGIOGRAM, CORONARY, WITH LEFT HEART CATHETERIZATION N/A 12/26/2023    Procedure: Angiogram, Coronary, with Left Heart Cath;  Surgeon: Carlos King MD;  Location: [facility] CATH LAB;  Service: Cardiology;  Laterality: N/A;    AORTIC VALVULOPLASTY N/A 2/1/2024    Procedure: REPAIR, AORTIC VALVE;  Surgeon: Shilo Carrasco MD;  Location: [facility] CATH LAB;  Service: Cardiology;  Laterality: N/A;    COLONOSCOPY  10/2020    ECHOCARDIOGRAM,TRANSESOPHAGEAL N/A 1/26/2024    Procedure: Transesophageal echo (JOHNY) intra-procedure log documentation;  Surgeon: Nick Mathur III, MD;  Location: [facility] EP LAB;  Service: Cardiology;  Laterality: N/A;    ESOPHAGEAL MANOMETRY WITH MEASUREMENT OF IMPEDANCE N/A 7/17/2023    Procedure: MANOMETRY, ESOPHAGUS, WITH IMPEDANCE MEASUREMENT;  Surgeon: Klarissa Zamora MD;  Location: [facility] ENDO (4TH FLR);  Service: Gastroenterology;  Laterality: N/A;  pt diabetic  liver txp  prep insructions sent to pt via email and portal -    ESOPHAGOGASTRODUODENOSCOPY  10/2020    ESOPHAGOGASTRODUODENOSCOPY N/A 7/1/2021    Procedure: EGD (ESOPHAGOGASTRODUODENOSCOPY);  Surgeon: Jovan David MD;  Location: [facility] ENDO (4TH FLR);  Service: Endoscopy;  Laterality: N/A;  HCC. Listed for liver transplant. EGD for variceal surveillance.  cardiac clearance and  blood thinenr approval received, see telephone encounter 6/23/21-BB  fully vaccinated-BB  labs same day of procedure-BB    EXCISION OF HYDROCELE Right     hemorroid surgery      hemorroidectomy      KIDNEY STONE SURGERY      LAPAROSCOPIC APPENDECTOMY N/A 6/2/2023    Procedure: APPENDECTOMY, LAPAROSCOPIC;  Surgeon: Shan Ross MD;  [facility];  Service: General;  Laterality: N/A;    LEFT HEART CATHETERIZATION N/A 1/27/2021    Procedure: Left heart cath;  Surgeon: Kris Shelton MD;  Location: [facility] CATH LAB;  Service: Cardiology;  Laterality: N/A;    liver mass removal      LIVER TRANSPLANT N/A 1/6/2022    Procedure: TRANSPLANT, LIVER;  Surgeon: Lizet Garcia MD;  Location: [facility] OR [facility] FLR;  Service: Transplant;  Laterality: N/A;    MAGNETIC RESONANCE IMAGING N/A 1/13/2025    Procedure: MRI (Magnetic Resonance Imagine);  Surgeon: Gaby Cuevas;  Location: [facility];  Service: Anesthesiology;  Laterality: N/A;  minimal sedation prior to MRI    RIGHT HEART CATHETERIZATION Right 5/7/2021    Procedure: INSERTION, CATHETER, RIGHT HEART;  Surgeon: Jaden Schuster MD;  Location: [facility] CATH LAB;  Service: Cardiology;  Laterality: Right;    STENT, DRUG ELUTING, SINGLE VESSEL, CORONARY  12/26/2023    Procedure: Stent, Drug Eluting, Single Vessel, Coronary;  Surgeon: Carlos King MD;  Location: [facility] CATH LAB;  Service: Cardiology;;    TREATMENT OF CARDIAC ARRHYTHMIA N/A 5/19/2021    Procedure: CARDIOVERSION;  Surgeon: Didier Tilley MD;  Location: [facility] EP LAB;  Service: Cardiology;  Laterality: N/A;  a fib, dccv/johny, mac, dm. sscu    TREATMENT OF CARDIAC ARRHYTHMIA N/A 5/31/2021    Procedure: CARDIOVERSION;  Surgeon: Daniel Arambula MD;  Location: [facility] EP LAB;  Service: Cardiology;  Laterality: N/A;  afib, DCCV, anest., DM, 3 prep    TREATMENT OF CARDIAC ARRHYTHMIA N/A 7/7/2021    Procedure: Cardioversion or Defibrillation;  Surgeon: Didier Tilley MD;  Location: [facility] EP LAB;  Service:  Cardiology;  Laterality: N/A;  AF, ASHISH (cancel if complaint), DCCV, MAC, DM, 3 Prep    TREATMENT OF CARDIAC ARRHYTHMIA N/A 7/27/2021    Procedure: Cardioversion or Defibrillation;  Surgeon: Didier Tilley MD;  Location: [facility] EP LAB;  Service: Cardiology;  Laterality: N/A;  AF, ASHISH (cx if complaint), DCCV, MAC, DM, 3 Prep    TREATMENT OF CARDIAC ARRHYTHMIA N/A 1/26/2024    Procedure: Cardioversion or Defibrillation;  Surgeon: Koko Knight MD;  Location: [facility] EP LAB;  Service: Cardiology;  Laterality: N/A;  AF, ASHISH, DCCV, MAC, DM, 3 Prep *ADENOSINE TEST* *LIVER TRANSPLANT PATIENT*       Review of patient's allergies indicates:   Allergen Reactions    Bee pollens Swelling     BEE STINGS swells body       Facility-Administered Medications Prior to Admission   Medication    sodium chloride 0.9% flush 10 mL     PTA Medications   Medication Sig    apixaban (ELIQUIS) 5 mg Tab Take 1 tablet (5 mg total) by mouth 2 (two) times daily.    atorvastatin (LIPITOR) 80 MG tablet Take 1 tablet (80 mg total) by mouth once daily.    blood sugar diagnostic Strp To check BG 2 times daily, to use with insurance preferred meter (patient has a [employer] self-monitoring glucose meter)    blood-glucose meter kit To check BG 2 times daily, to use with insurance preferred meter    buPROPion (WELLBUTRIN) 75 MG tablet Take 75 mg by mouth Daily. In the morning    carvediloL (COREG) 12.5 MG tablet Take 1 tablet (12.5 mg total) by mouth 2 (two) times daily with meals.    cycloSPORINE modified, NEORAL, (NEORAL) 25 MG capsule Take 2 capsules (50 mg total) by mouth every morning AND 1 capsule (25 mg total) every evening.    diazePAM (VALIUM) 5 MG tablet Take 1 tablet (5 mg total) by mouth as needed for Anxiety. Take 1 tab 30 min prior to MRI, take 2nd as needed    empagliflozin (JARDIANCE) 10 mg tablet Take 1 tablet (10 mg total) by mouth once daily.    ferrous sulfate (IRON) 325 mg (65 mg iron) Tab tablet Take 1 tablet (325 mg total) by mouth  "once daily.    furosemide (LASIX) 20 MG tablet Take 1 tablet (20 mg total) by mouth once daily.    HYDROcodone-acetaminophen (NORCO) 5-325 mg per tablet Take 1 tablet by mouth every 8 (eight) hours as needed for Pain.    insulin glargine U-100, Lantus, (LANTUS SOLOSTAR U-100 INSULIN) 100 unit/mL (3 mL) InPn pen Inject 22 Units into the skin every evening.    insulin lispro (HUMALOG KWIKPEN INSULIN) 100 unit/mL pen Inject 6 Units into the skin 3 (three) times daily with meals. Plus sliding scale, MDD: 48 units    lancets Misc To check BG 2 times daily, to use with insurance preferred meter    levETIRAcetam (KEPPRA) 500 MG Tab Take 1 tablet (500 mg total) by mouth 2 (two) times daily.    magnesium oxide (MAG-OX) 400 mg (241.3 mg magnesium) tablet Take 1 tablet (400 mg total) by mouth 2 (two) times daily.    multivitamin capsule Take 1 capsule by mouth once daily.    mycophenolate (CELLCEPT) 250 mg Cap Take 2 capsules (500 mg total) by mouth 2 (two) times daily.    omega-3 fatty acids/fish oil (FISH OIL-OMEGA-3 FATTY ACIDS) 300-1,000 mg capsule Take 1 capsule by mouth once daily.     pantoprazole (PROTONIX) 40 MG tablet Take 1 tablet (40 mg total) by mouth once daily.    pen needle, diabetic (BD ULTRA-FINE GÉNESIS PEN NEEDLE) 32 gauge x 5/32" Ndle Use to inject insulin 4 times daily    pregabalin (LYRICA) 75 MG capsule Take 1 capsule (75 mg total) by mouth 2 (two) times daily.    QUEtiapine (SEROQUEL) 100 MG Tab Take 1 tablet (100 mg total) by mouth every evening.    sacubitriL-valsartan (ENTRESTO) 49-51 mg per tablet Take 1 tablet by mouth 2 (two) times daily.     Family History       Problem Relation (Age of Onset)    Coronary artery disease Father          Tobacco Use    Smoking status: Former     Current packs/day: 0.00     Types: Cigarettes     Quit date: 1980     Years since quittin.0    Smokeless tobacco: Former   Substance and Sexual Activity    Alcohol use: Not Currently    Drug use: Never    Sexual " "activity: Not on file     Review of Systems   Constitutional: Negative for chills, diaphoresis and night sweats.   Cardiovascular:  Positive for dyspnea on exertion. Negative for chest pain, irregular heartbeat, leg swelling, near-syncope and palpitations.   Respiratory:  Positive for shortness of breath. Negative for cough and wheezing.    Skin:  Negative for color change and dry skin.   Musculoskeletal:  Negative for joint pain and joint swelling.   Gastrointestinal:  Positive for bloating. Negative for abdominal pain, diarrhea, nausea and vomiting.   Genitourinary:  Negative for dysuria.   Neurological:  Positive for weakness. Negative for dizziness, headaches and light-headedness.   Psychiatric/Behavioral:          Upset   All other systems reviewed and are negative.    Objective:     Vital Signs (Most Recent):    Vital Signs (24h Range):           There is no height or weight on file to calculate BMI.            No intake or output data in the 24 hours ending 02/18/25 0831    Lines/Drains/Airways       None                    Physical Exam  Constitutional:       Appearance: Normal appearance.   HENT:      Mouth/Throat:      Mouth: Mucous membranes are moist.   Eyes:      Extraocular Movements: Extraocular movements intact.   Cardiovascular:      Rate and Rhythm: Normal rate and regular rhythm.      Heart sounds: Murmur heard.      Harsh midsystolic murmur is present with a grade of 4/6 at the upper right sternal border radiating to the neck.   Pulmonary:      Effort: Pulmonary effort is normal.   Abdominal:      Palpations: Abdomen is soft.   Musculoskeletal:      Right lower leg: No edema.      Left lower leg: No edema.   Skin:     General: Skin is warm.   Neurological:      Mental Status: He is alert and oriented to person, place, and time.            Significant Labs: ABG: No results for input(s): "PH", "PCO2", "HCO3", "POCSATURATED", "BE" in the last 48 hours., Blood Culture: No results for input(s): " ""LABBLOO" in the last 48 hours., BMP: No results for input(s): "GLU", "NA", "K", "CL", "CO2", "BUN", "CREATININE", "CALCIUM", "MG" in the last 48 hours., CMP No results for input(s): "NA", "K", "CL", "CO2", "GLU", "BUN", "CREATININE", "CALCIUM", "PROT", "ALBUMIN", "BILITOT", "ALKPHOS", "AST", "ALT", "ANIONGAP", "ESTGFRAFRICA", "EGFRNONAA" in the last 48 hours., CBC No results for input(s): "WBC", "HGB", "HCT", "PLT" in the last 48 hours., INR No results for input(s): "INR", "PROTIME" in the last 48 hours., Lipid Panel No results for input(s): "CHOL", "HDL", "LDLCALC", "TRIG", "CHOLHDL" in the last 48 hours.,   Pathology Results  (Last 10 years)                 06/02/23 1248  Specimen to Pathology, Surgery General Surgery Final result    Narrative:  Pre-op Diagnosis: Acute appendicitis, unspecified acute   appendicitis type [K35.80]   Procedure(s):   APPENDECTOMY, LAPAROSCOPIC   Number of specimens: 1   Name of specimens: 1. Appendix, permanent   Which provider would you like to cc?->GARLAND MILLER   Release to patient->Immediate   Specimen total (fresh, frozen, permanent):->1       01/06/22 1226  Specimen to Pathology, Surgery Liver Final result    Narrative:  Pre-op Diagnosis: End stage liver disease [K72.10]   Procedure(s):   TRANSPLANT, LIVER   Number of specimens: 2   Name of specimens:   1. Donor liver biopsy (permanent)   2. Old liver (permanent)   Specimen total (fresh, frozen, permanent):->2       07/01/21 0842  Specimen to Pathology, Surgery Gastrointestinal tract Final result    Narrative:  Pre-op Diagnosis: Screening [Z13.9]   Liver cirrhosis secondary to CASTILLO [K75.81, K74.60]   HCC (hepatocellular carcinoma) [C22.0]   Procedure(s):   EGD (ESOPHAGOGASTRODUODENOSCOPY)   Number of specimens: 2   Name of specimens:   Jar 1: r/o Aragon's esophagus at 45 cm   Jar 2: r/o Aragon's esophagus at 43 cm   Release to patient->Immediate   Specimen total (fresh, frozen, permanent):->2       12/09/20 1105  " "Specimen to Pathology, Dermatology Final result    Narrative:  Number of Specimens:->1   ------------------------->-------------------------   Spec 1 Procedure:->Biopsy   Spec 1 Clinical Impression:->ISK vs MM vs other   Spec 1 Source:->L inguinal fold           , and Troponin No results for input(s): "TROPONINIHS" in the last 48 hours.     "

## 2025-02-19 ENCOUNTER — TELEPHONE (OUTPATIENT)
Dept: CARDIAC REHAB | Facility: CLINIC | Age: 70
End: 2025-02-19
Payer: MEDICARE

## 2025-02-19 ENCOUNTER — ANESTHESIA EVENT (OUTPATIENT)
Dept: MEDSURG UNIT | Facility: HOSPITAL | Age: 70
DRG: 267 | End: 2025-02-19
Payer: MEDICARE

## 2025-02-19 ENCOUNTER — ANESTHESIA (OUTPATIENT)
Dept: MEDSURG UNIT | Facility: HOSPITAL | Age: 70
DRG: 267 | End: 2025-02-19
Payer: MEDICARE

## 2025-02-19 VITALS
SYSTOLIC BLOOD PRESSURE: 110 MMHG | DIASTOLIC BLOOD PRESSURE: 54 MMHG | OXYGEN SATURATION: 96 % | RESPIRATION RATE: 14 BRPM | WEIGHT: 209.44 LBS | BODY MASS INDEX: 28.37 KG/M2 | TEMPERATURE: 98 F | HEART RATE: 66 BPM | HEIGHT: 72 IN

## 2025-02-19 DIAGNOSIS — E11.9 TYPE 2 DIABETES MELLITUS WITHOUT COMPLICATION, UNSPECIFIED WHETHER LONG TERM INSULIN USE: ICD-10-CM

## 2025-02-19 LAB
OHS QRS DURATION: 128 MS
OHS QRS DURATION: 154 MS
OHS QRS DURATION: 162 MS
OHS QRS DURATION: 164 MS
OHS QTC CALCULATION: 491 MS
OHS QTC CALCULATION: 492 MS
OHS QTC CALCULATION: 505 MS
OHS QTC CALCULATION: 547 MS
POC ACTIVATED CLOTTING TIME K: 383 SEC (ref 74–137)
POCT GLUCOSE: 120 MG/DL (ref 70–110)
POCT GLUCOSE: 127 MG/DL (ref 70–110)
POCT GLUCOSE: 89 MG/DL (ref 70–110)
SAMPLE: ABNORMAL

## 2025-02-19 PROCEDURE — 25000003 PHARM REV CODE 250: Performed by: STUDENT IN AN ORGANIZED HEALTH CARE EDUCATION/TRAINING PROGRAM

## 2025-02-19 PROCEDURE — 93600 BUNDLE OF HIS RECORDING: CPT | Mod: XU | Performed by: INTERNAL MEDICINE

## 2025-02-19 PROCEDURE — 25000003 PHARM REV CODE 250: Performed by: NURSE ANESTHETIST, CERTIFIED REGISTERED

## 2025-02-19 PROCEDURE — 02H63JZ INSERTION OF PACEMAKER LEAD INTO RIGHT ATRIUM, PERCUTANEOUS APPROACH: ICD-10-PCS | Performed by: INTERNAL MEDICINE

## 2025-02-19 PROCEDURE — 0JH606Z INSERTION OF PACEMAKER, DUAL CHAMBER INTO CHEST SUBCUTANEOUS TISSUE AND FASCIA, OPEN APPROACH: ICD-10-PCS | Performed by: INTERNAL MEDICINE

## 2025-02-19 PROCEDURE — 25000003 PHARM REV CODE 250: Performed by: INTERNAL MEDICINE

## 2025-02-19 PROCEDURE — 63600175 PHARM REV CODE 636 W HCPCS: Performed by: INTERNAL MEDICINE

## 2025-02-19 PROCEDURE — 02HK3JZ INSERTION OF PACEMAKER LEAD INTO RIGHT VENTRICLE, PERCUTANEOUS APPROACH: ICD-10-PCS | Performed by: INTERNAL MEDICINE

## 2025-02-19 PROCEDURE — 93005 ELECTROCARDIOGRAM TRACING: CPT

## 2025-02-19 PROCEDURE — 63600175 PHARM REV CODE 636 W HCPCS: Performed by: ANESTHESIOLOGY

## 2025-02-19 PROCEDURE — 33208 INSRT HEART PM ATRIAL & VENT: CPT | Performed by: INTERNAL MEDICINE

## 2025-02-19 PROCEDURE — 63600175 PHARM REV CODE 636 W HCPCS: Performed by: NURSE ANESTHETIST, CERTIFIED REGISTERED

## 2025-02-19 PROCEDURE — 63600175 PHARM REV CODE 636 W HCPCS: Performed by: STUDENT IN AN ORGANIZED HEALTH CARE EDUCATION/TRAINING PROGRAM

## 2025-02-19 PROCEDURE — 25500020 PHARM REV CODE 255: Performed by: INTERNAL MEDICINE

## 2025-02-19 RX ORDER — DOXYCYCLINE HYCLATE 100 MG
100 TABLET ORAL EVERY 12 HOURS
Status: DISCONTINUED | OUTPATIENT
Start: 2025-02-19 | End: 2025-02-19 | Stop reason: HOSPADM

## 2025-02-19 RX ORDER — SODIUM CHLORIDE 0.9 % (FLUSH) 0.9 %
10 SYRINGE (ML) INJECTION
Status: DISCONTINUED | OUTPATIENT
Start: 2025-02-19 | End: 2025-02-19 | Stop reason: HOSPADM

## 2025-02-19 RX ORDER — BUPIVACAINE HYDROCHLORIDE 2.5 MG/ML
INJECTION, SOLUTION EPIDURAL; INFILTRATION; INTRACAUDAL
Status: DISCONTINUED | OUTPATIENT
Start: 2025-02-19 | End: 2025-02-19 | Stop reason: HOSPADM

## 2025-02-19 RX ORDER — GLUCAGON 1 MG
1 KIT INJECTION
Status: DISCONTINUED | OUTPATIENT
Start: 2025-02-19 | End: 2025-02-19 | Stop reason: HOSPADM

## 2025-02-19 RX ORDER — NOREPINEPHRINE BITARTRATE 0.02 MG/ML
0-3 INJECTION, SOLUTION INTRAVENOUS CONTINUOUS
Status: DISCONTINUED | OUTPATIENT
Start: 2025-02-19 | End: 2025-02-19 | Stop reason: HOSPADM

## 2025-02-19 RX ORDER — LIDOCAINE HYDROCHLORIDE 20 MG/ML
INJECTION, SOLUTION INFILTRATION; PERINEURAL
Status: DISCONTINUED | OUTPATIENT
Start: 2025-02-19 | End: 2025-02-19 | Stop reason: HOSPADM

## 2025-02-19 RX ORDER — HEPARIN SOD,PORCINE/0.9 % NACL 1000/500ML
INTRAVENOUS SOLUTION INTRAVENOUS
Status: DISCONTINUED | OUTPATIENT
Start: 2025-02-19 | End: 2025-02-19 | Stop reason: HOSPADM

## 2025-02-19 RX ORDER — HALOPERIDOL 5 MG/ML
0.5 INJECTION INTRAMUSCULAR EVERY 10 MIN PRN
Status: DISCONTINUED | OUTPATIENT
Start: 2025-02-19 | End: 2025-02-19 | Stop reason: HOSPADM

## 2025-02-19 RX ORDER — FENTANYL CITRATE 50 UG/ML
INJECTION, SOLUTION INTRAMUSCULAR; INTRAVENOUS
Status: DISCONTINUED | OUTPATIENT
Start: 2025-02-19 | End: 2025-02-19

## 2025-02-19 RX ORDER — VANCOMYCIN HYDROCHLORIDE 1 G/20ML
INJECTION, POWDER, LYOPHILIZED, FOR SOLUTION INTRAVENOUS
Status: DISCONTINUED | OUTPATIENT
Start: 2025-02-19 | End: 2025-02-19 | Stop reason: HOSPADM

## 2025-02-19 RX ORDER — IODIXANOL 320 MG/ML
INJECTION, SOLUTION INTRAVASCULAR
Status: DISCONTINUED | OUTPATIENT
Start: 2025-02-19 | End: 2025-02-19

## 2025-02-19 RX ORDER — PROPOFOL 10 MG/ML
VIAL (ML) INTRAVENOUS CONTINUOUS PRN
Status: DISCONTINUED | OUTPATIENT
Start: 2025-02-19 | End: 2025-02-19

## 2025-02-19 RX ORDER — NOREPINEPHRINE BITARTRATE 1 MG/ML
INJECTION, SOLUTION INTRAVENOUS
Status: DISCONTINUED | OUTPATIENT
Start: 2025-02-19 | End: 2025-02-19

## 2025-02-19 RX ORDER — SODIUM CHLORIDE 0.9 G/100ML
INJECTION, SOLUTION IRRIGATION
Status: DISCONTINUED | OUTPATIENT
Start: 2025-02-19 | End: 2025-02-19 | Stop reason: HOSPADM

## 2025-02-19 RX ORDER — ONDANSETRON HYDROCHLORIDE 2 MG/ML
INJECTION, SOLUTION INTRAVENOUS
Status: DISCONTINUED | OUTPATIENT
Start: 2025-02-19 | End: 2025-02-19

## 2025-02-19 RX ORDER — CEFAZOLIN SODIUM 1 G/3ML
INJECTION, POWDER, FOR SOLUTION INTRAMUSCULAR; INTRAVENOUS
Status: DISCONTINUED | OUTPATIENT
Start: 2025-02-19 | End: 2025-02-19

## 2025-02-19 RX ORDER — FENTANYL CITRATE 50 UG/ML
25 INJECTION, SOLUTION INTRAMUSCULAR; INTRAVENOUS EVERY 5 MIN PRN
Status: DISCONTINUED | OUTPATIENT
Start: 2025-02-19 | End: 2025-02-19 | Stop reason: HOSPADM

## 2025-02-19 RX ORDER — DOXYCYCLINE HYCLATE 100 MG
100 TABLET ORAL 2 TIMES DAILY
Qty: 10 TABLET | Refills: 0 | Status: SHIPPED | OUTPATIENT
Start: 2025-02-19 | End: 2025-02-24

## 2025-02-19 RX ADMIN — NOREPINEPHRINE BITARTRATE 16 MCG: 1 INJECTION, SOLUTION, CONCENTRATE INTRAVENOUS at 09:02

## 2025-02-19 RX ADMIN — FENTANYL CITRATE 25 MCG: 50 INJECTION, SOLUTION INTRAMUSCULAR; INTRAVENOUS at 10:02

## 2025-02-19 RX ADMIN — ONDANSETRON 4 MG: 2 INJECTION INTRAMUSCULAR; INTRAVENOUS at 09:02

## 2025-02-19 RX ADMIN — FUROSEMIDE 20 MG: 20 TABLET ORAL at 08:02

## 2025-02-19 RX ADMIN — ACETAMINOPHEN 650 MG: 325 TABLET ORAL at 12:02

## 2025-02-19 RX ADMIN — NOREPINEPHRINE BITARTRATE 16 MCG: 1 INJECTION, SOLUTION, CONCENTRATE INTRAVENOUS at 11:02

## 2025-02-19 RX ADMIN — CARVEDILOL 12.5 MG: 12.5 TABLET, FILM COATED ORAL at 08:02

## 2025-02-19 RX ADMIN — NOREPINEPHRINE BITARTRATE 0.05 MCG/KG/MIN: 1 INJECTION, SOLUTION, CONCENTRATE INTRAVENOUS at 09:02

## 2025-02-19 RX ADMIN — ASPIRIN 81 MG CHEWABLE TABLET 81 MG: 81 TABLET CHEWABLE at 08:02

## 2025-02-19 RX ADMIN — MYCOPHENOLATE MOFETIL 500 MG: 250 CAPSULE ORAL at 08:02

## 2025-02-19 RX ADMIN — CYCLOSPORINE 50 MG: 25 CAPSULE, LIQUID FILLED ORAL at 08:02

## 2025-02-19 RX ADMIN — PROPOFOL 20 MG: 10 INJECTION, EMULSION INTRAVENOUS at 09:02

## 2025-02-19 RX ADMIN — NOREPINEPHRINE BITARTRATE 8 MCG: 1 INJECTION, SOLUTION, CONCENTRATE INTRAVENOUS at 09:02

## 2025-02-19 RX ADMIN — ACETAMINOPHEN 650 MG: 325 TABLET ORAL at 04:02

## 2025-02-19 RX ADMIN — IODIXANOL 10 ML: 320 INJECTION, SOLUTION INTRAVASCULAR at 10:02

## 2025-02-19 RX ADMIN — SODIUM CHLORIDE: 0.9 INJECTION, SOLUTION INTRAVENOUS at 09:02

## 2025-02-19 RX ADMIN — LEVETIRACETAM 500 MG: 500 TABLET, FILM COATED ORAL at 08:02

## 2025-02-19 RX ADMIN — ATORVASTATIN CALCIUM 80 MG: 40 TABLET, FILM COATED ORAL at 08:02

## 2025-02-19 RX ADMIN — FENTANYL CITRATE 50 MCG: 50 INJECTION, SOLUTION INTRAMUSCULAR; INTRAVENOUS at 09:02

## 2025-02-19 RX ADMIN — CEFAZOLIN 2 G: 330 INJECTION, POWDER, FOR SOLUTION INTRAMUSCULAR; INTRAVENOUS at 09:02

## 2025-02-19 RX ADMIN — PANTOPRAZOLE SODIUM 40 MG: 40 TABLET, DELAYED RELEASE ORAL at 08:02

## 2025-02-19 RX ADMIN — PREGABALIN 75 MG: 75 CAPSULE ORAL at 08:02

## 2025-02-19 RX ADMIN — FENTANYL CITRATE 12.5 MCG: 50 INJECTION INTRAMUSCULAR; INTRAVENOUS at 02:02

## 2025-02-19 RX ADMIN — PROPOFOL 80 MCG/KG/MIN: 10 INJECTION, EMULSION INTRAVENOUS at 09:02

## 2025-02-19 NOTE — PROGRESS NOTES
[facility] - Cardiology Stepdown  Cardiac Electrophysiology  Progress Note    Admission Date: 2/18/2025  Code Status: Prior   Attending Physician: Shilo Carrasco MD   Expected Discharge Date:   Principal Problem:<principal problem not specified>    Subjective:     Interval History: No 12 lead EKG on system but telemetry still showing wide QRS (measuring up 160 ms)  Will do EPS today     Review of Systems   All other systems reviewed and are negative.    Objective:     Vital Signs (Most Recent):  Temp: 98.3 °F (36.8 °C) (02/19/25 0319)  Pulse: 78 (02/19/25 0600)  Resp: 18 (02/19/25 0319)  BP: (!) 156/79 (02/19/25 0319)  SpO2: 97 % (02/19/25 0319) Vital Signs (24h Range):  Temp:  [97.4 °F (36.3 °C)-98.3 °F (36.8 °C)] 98.3 °F (36.8 °C)  Pulse:  [43-99] 78  Resp:  [12-20] 18  SpO2:  [92 %-100 %] 97 %  BP: ()/(47-79) 156/79     Weight: 95 kg (209 lb 7 oz)  Body mass index is 28.4 kg/m².     SpO2: 97 %        Physical Exam  HENT:      Head: Normocephalic.      Mouth/Throat:      Mouth: Mucous membranes are moist.   Cardiovascular:      Rate and Rhythm: Normal rate and regular rhythm.      Heart sounds: Normal heart sounds, S1 normal and S2 normal.   Pulmonary:      Effort: Pulmonary effort is normal.   Musculoskeletal:         General: Normal range of motion.      Right lower leg: No edema.      Left lower leg: No edema.   Skin:     General: Skin is warm.   Neurological:      Mental Status: He is alert.            Significant Labs: EP:   Recent Labs   Lab 02/18/25  0945      K 4.0      CO2 23   *   BUN 27*   CREATININE 1.1   CALCIUM 9.4   ANIONGAP 10   WBC 6.06   HGB 12.2*   HCT 39.1*   PLT 94*       Assessment and Plan:     LBBB (left bundle branch block)    Tej Purdy with severe AS s/p TAVR (2/18/2025), by Dr. Nugent. Last EF 10/2024 reported to be %65  Symptomatic severe AS with LVEF of 65% s/p TAVR with new LBBB. Recommend overnight telemetry monitoring. If LBBB persists then  will plan for EPS +/-PPM.    Patient has Afib and last time taking eliquis was 3 days ago.   He denies any chest ports, radiation or previous surgeries     TVP: VVI, base rate 40, threshold 1 Mv , output    PLAN:  - LBBB persisted, will need EP study to evaluate the need for PPM  - NPO           Manuel Mayorga MD  Cardiac Electrophysiology  [facility] - Cardiology Stepdown

## 2025-02-19 NOTE — LETTER
February 19, 2025    Tej Purdy  [address]  [address]  [place] [address]             [facility]  [address]  [place] [address]  Phone: [phone]                                  [facility]   [address]   [place] [address]  [phone]         [facility]   [address] [place] [address] [phone]         [facility]    [address]  [place] [address] [phone]   Re: Tej Purdy  Clinic number: [id number]    Dear Mr. Purdy:    You were recently admitted to an [facility] facility for cardiac (heart) problem.  Your physician has referred you to [facility].  Cardiac Rehab Phase 2 is an educational and exercise program, conducted in a outpatient setting, proven to help reduce your risk for recurrent heart events.    Cardiac rehab has two major parts:    1. Exercise training to help you achieve cardiovascular fitness while learning how to exercise safely and improve muscle strength and endurance.  Your exercise prescription will be based on the results of the cardiopulmonary stress test (CPX) which will be done before entering the program and at completion.  2. Education, counseling and training to help you understand your heart condition and find ways to reduce your risk of future heart problems.  The cardiac rehab team will help you learn how to cope with the stress of adjusting to a new lifestyle and to deal with your fears about the future.    Phase 2 is a 36-session program, meeting 3 times a week for 12 weeks.  Each session consists of an hour of exercise and half-hour dedicated to the educational topic of the day.  Class days vary per location.  Please contact your nearest facility for details.    Through cardiac rehab you will learn:  About your heart condition, medical therapies, and medication  Risk factors in y our  lifestyle contributing to heart disease  New strategies to modify your risk factors  About a healthy diet that can lower your blood cholesterol, control weight, help prevent or control high blood pressure, and diabetes  How to stop smoking  How to manage stress    If you are interested in getting started, call the [facility] of your choosing.     Sincerely,     [facility]

## 2025-02-19 NOTE — HOSPITAL COURSE
Patient brought for planned TAVR that was successful with a 34 mm EvolutFX valve, please see report for full details. Patient tolerated the procedure well. Telemetry post-TAVR noted new LBBB. PPM placed. Patient was instructed to continue home medications and was discharged in stable condition.

## 2025-02-19 NOTE — ANESTHESIA PREPROCEDURE EVALUATION
02/19/2025  Tej Purdy is a 69 y.o., male.    Pre-operative evaluation for Procedure(s) (LRB):  EP - diagnostic (N/A)  INSERTION, CARDIAC PACEMAKER, DUAL CHAMBER (N/A)    Tej Purdy is a 69 y.o. male s/p TAVR yesterday for severe AS with new LBBB.  Presenting for EP studying +/- PPM today.  No issues with TAVR anesthetic.    Problem List[1]    Review of patient's allergies indicates:   Allergen Reactions    Bee pollens Swelling     BEE STINGS swells body       Medications Ordered Prior to Encounter[2]    Past Surgical History:   Procedure Laterality Date    ANGIOGRAM, CORONARY, WITH LEFT HEART CATHETERIZATION N/A 12/26/2023    Procedure: Angiogram, Coronary, with Left Heart Cath;  Surgeon: Carlos King MD;  Location: [facility] CATH LAB;  Service: Cardiology;  Laterality: N/A;    AORTIC VALVULOPLASTY N/A 2/1/2024    Procedure: REPAIR, AORTIC VALVE;  Surgeon: Shilo Carrasco MD;  Location: [facility] CATH LAB;  Service: Cardiology;  Laterality: N/A;    COLONOSCOPY  10/2020    ECHOCARDIOGRAM,TRANSESOPHAGEAL N/A 1/26/2024    Procedure: Transesophageal echo (ASHISH) intra-procedure log documentation;  Surgeon: Nick Mathur III, MD;  Location: [facility] EP LAB;  Service: Cardiology;  Laterality: N/A;    ESOPHAGEAL MANOMETRY WITH MEASUREMENT OF IMPEDANCE N/A 7/17/2023    Procedure: MANOMETRY, ESOPHAGUS, WITH IMPEDANCE MEASUREMENT;  Surgeon: Klarissa Zamora MD;  [facility]  Service: Gastroenterology;  Laterality: N/A;  pt diabetic  liver txp  prep insructions sent to pt via email and portal -    ESOPHAGOGASTRODUODENOSCOPY  10/2020    ESOPHAGOGASTRODUODENOSCOPY N/A 7/1/2021    Procedure: EGD (ESOPHAGOGASTRODUODENOSCOPY);  Surgeon: Jovan David MD;  Location: [facility] ENDO (4TH FLR);  Service: Endoscopy;  Laterality: N/A;  HCC. Listed for liver transplant. EGD for variceal  surveillance.  cardiac clearance and blood thinenr approval received, see telephone encounter 6/23/21-BB  fully vaccinated-BB  labs same day of procedure-BB    EXCISION OF HYDROCELE Right     hemorroid surgery      hemorroidectomy      KIDNEY STONE SURGERY      LAPAROSCOPIC APPENDECTOMY N/A 6/2/2023    Procedure: APPENDECTOMY, LAPAROSCOPIC;  Surgeon: Shan Ross MD;  [facility];  Service: General;  Laterality: N/A;    LEFT HEART CATHETERIZATION N/A 1/27/2021    Procedure: Left heart cath;  Surgeon: Kris Shelton MD;  Location: [facility] CATH LAB;  Service: Cardiology;  Laterality: N/A;    liver mass removal      LIVER TRANSPLANT N/A 1/6/2022    Procedure: TRANSPLANT, LIVER;  Surgeon: Lizet Garcia MD;  [facility];  Service: Transplant;  Laterality: N/A;    MAGNETIC RESONANCE IMAGING N/A 1/13/2025    Procedure: MRI (Magnetic Resonance Imagine);  Surgeon: Gaby Cuevas;  Location: [facility];  Service: Anesthesiology;  Laterality: N/A;  minimal sedation prior to MRI    RIGHT HEART CATHETERIZATION Right 5/7/2021    Procedure: INSERTION, CATHETER, RIGHT HEART;  Surgeon: Jaden Schuster MD;  Location: [facility] CATH LAB;  Service: Cardiology;  Laterality: Right;    STENT, DRUG ELUTING, SINGLE VESSEL, CORONARY  12/26/2023    Procedure: Stent, Drug Eluting, Single Vessel, Coronary;  Surgeon: Carlos King MD;  Location: [facility] CATH LAB;  Service: Cardiology;;    TREATMENT OF CARDIAC ARRHYTHMIA N/A 5/19/2021    Procedure: CARDIOVERSION;  Surgeon: Didier Tilley MD;  Location: [facility] EP LAB;  Service: Cardiology;  Laterality: N/A;  a fib, dccv/johny, mac, dm. sscu    TREATMENT OF CARDIAC ARRHYTHMIA N/A 5/31/2021    Procedure: CARDIOVERSION;  Surgeon: Daniel Arambula MD;  Location: [facility] EP LAB;  Service: Cardiology;  Laterality: N/A;  afib, DCCV, anest., DM, 3 prep    TREATMENT OF CARDIAC ARRHYTHMIA N/A 7/7/2021    Procedure: Cardioversion or Defibrillation;  Surgeon: Didier Tilley MD;   "Location: [facility] EP LAB;  Service: Cardiology;  Laterality: N/A;  AF, ASHISH (cancel if complaint), DCCV, MAC, DM, 3 Prep    TREATMENT OF CARDIAC ARRHYTHMIA N/A 2021    Procedure: Cardioversion or Defibrillation;  Surgeon: Didier Tilley MD;  Location: [facility] EP LAB;  Service: Cardiology;  Laterality: N/A;  AF, ASHISH (cx if complaint), DCCV, MAC, DM, 3 Prep    TREATMENT OF CARDIAC ARRHYTHMIA N/A 2024    Procedure: Cardioversion or Defibrillation;  Surgeon: Koko Knight MD;  Location: [facility] EP LAB;  Service: Cardiology;  Laterality: N/A;  AF, ASHISH, DCCV, MAC, DM, 3 Prep *ADENOSINE TEST* *LIVER TRANSPLANT PATIENT*       Social History[3]      CBC:   Recent Labs     25  0945   WBC 6.06   RBC 4.63   HGB 12.2*   HCT 39.1*   PLT 94*   MCV 84   MCH 26.3*   MCHC 31.2*       CMP:   Recent Labs     25  0945      K 4.0      CO2 23   BUN 27*   CREATININE 1.1   *   CALCIUM 9.4       INR  No results for input(s): "PT", "INR", "PROTIME", "APTT" in the last 72 hours.        Diagnostic Studies:      EKD Echo:  No results found. However, due to the size of the patient record, not all encounters were searched. Please check Results Review for a complete set of results.        Pre-op Assessment    I have reviewed the Patient Summary Reports.    I have reviewed the NPO Status.   I have reviewed the Medications.     Review of Systems  Anesthesia Hx:  No problems with previous Anesthesia               Denies Personal Hx of Anesthesia complications.                    Cardiovascular:  Exercise tolerance: poor   Hypertension Valvular problems/Murmurs   Dysrhythmias   CHF       ECG has been reviewed.                            Hepatic/GI:      Liver Disease,  S/p liver tx             Neurological:    Denies CVA.    Seizures                                Endocrine:  Diabetes               Physical Exam  General: Cooperative, Alert and Oriented    Airway:  Mallampati: III / II  Mouth Opening: " Normal  TM Distance: Normal  Tongue: Normal  Neck ROM: Normal ROM    Dental:  Dentures        Anesthesia Plan  Type of Anesthesia, risks & benefits discussed:    Anesthesia Type: Gen Natural Airway  Intra-op Monitoring Plan: Standard ASA Monitors  Post Op Pain Control Plan: IV/PO Opioids PRN and multimodal analgesia  Induction:  IV  Informed Consent: Informed consent signed with the Patient and all parties understand the risks and agree with anesthesia plan.  All questions answered.   ASA Score: 4  Day of Surgery Review of History & Physical: H&P Update referred to the surgeon/provider.    Ready For Surgery From Anesthesia Perspective.     .           [1]   Patient Active Problem List  Diagnosis    Mixed hyperlipidemia    Primary hypertension    Skin cancer    Kidney stone    Diabetic neuropathy    PAD (peripheral artery disease)    Leukocytosis    Abdominal pain    Type 2 diabetes mellitus, with long-term current use of insulin    Coronary artery disease involving native coronary artery of native heart without angina pectoris    Atrial fibrillation with rapid ventricular response    History of cardioversion    Chronic anticoagulation    S/P liver transplant    At risk for opportunistic infections    Prophylactic immunotherapy    Anemia of chronic disease    Long-term use of immunosuppressant medication    Weakness    Type 2 diabetes mellitus with hyperglycemia    Anemia due to unknown mechanism    Fatigue    Other ascites    Cholangiocarcinoma    Severe aortic stenosis    Unstable angina    Presence of drug-eluting stent in left circumflex coronary artery    Orthostatic dizziness    Nephrolithiasis    Chronic systolic heart failure    History of liver transplant    Iron deficiency anemia    Immunodeficiency due to drugs    Aortic stenosis    History of cholangiocarcinoma    Thrombocytopenia    Seizure    Hypercoagulable state due to atrial fibrillation    History of skin cancer    Immunocompromised state     Hypertensive urgency    LBBB (left bundle branch block)   [2]   No current facility-administered medications on file prior to encounter.     Current Outpatient Medications on File Prior to Encounter   Medication Sig Dispense Refill    atorvastatin (LIPITOR) 80 MG tablet Take 1 tablet (80 mg total) by mouth once daily. 90 tablet 3    carvediloL (COREG) 12.5 MG tablet Take 1 tablet (12.5 mg total) by mouth 2 (two) times daily with meals. 180 tablet 3    empagliflozin (JARDIANCE) 10 mg tablet Take 1 tablet (10 mg total) by mouth once daily. 90 tablet 3    ferrous sulfate (IRON) 325 mg (65 mg iron) Tab tablet Take 1 tablet (325 mg total) by mouth once daily. 30 tablet 2    furosemide (LASIX) 20 MG tablet Take 1 tablet (20 mg total) by mouth once daily. 90 tablet 3    insulin glargine U-100, Lantus, (LANTUS SOLOSTAR U-100 INSULIN) 100 unit/mL (3 mL) InPn pen Inject 22 Units into the skin every evening. 15 mL 11    levETIRAcetam (KEPPRA) 500 MG Tab Take 1 tablet (500 mg total) by mouth 2 (two) times daily. 60 tablet 11    magnesium oxide (MAG-OX) 400 mg (241.3 mg magnesium) tablet Take 1 tablet (400 mg total) by mouth 2 (two) times daily. (Patient taking differently: Take 400 mg by mouth once daily.) 60 tablet 11    multivitamin capsule Take 1 capsule by mouth once daily.      omega-3 fatty acids/fish oil (FISH OIL-OMEGA-3 FATTY ACIDS) 300-1,000 mg capsule Take 1 capsule by mouth once daily.       pantoprazole (PROTONIX) 40 MG tablet Take 1 tablet (40 mg total) by mouth once daily. 90 tablet 1    pregabalin (LYRICA) 75 MG capsule Take 1 capsule (75 mg total) by mouth 2 (two) times daily. 60 capsule 0    QUEtiapine (SEROQUEL) 100 MG Tab Take 1 tablet (100 mg total) by mouth every evening. 90 tablet 1    sacubitriL-valsartan (ENTRESTO) 49-51 mg per tablet Take 1 tablet by mouth 2 (two) times daily. 180 tablet 3    apixaban (ELIQUIS) 5 mg Tab Take 1 tablet (5 mg total) by mouth 2 (two) times daily. 180 tablet 3    blood  "sugar diagnostic Strp To check BG 2 times daily, to use with insurance preferred meter (patient has a TrueMetrix self-monitoring glucose meter) 100 strip 11    blood-glucose meter kit To check BG 2 times daily, to use with insurance preferred meter 1 each 0    diazePAM (VALIUM) 5 MG tablet Take 1 tablet (5 mg total) by mouth as needed for Anxiety. Take 1 tab 30 min prior to MRI, take 2nd as needed 2 tablet 0    HYDROcodone-acetaminophen (NORCO) 5-325 mg per tablet Take 1 tablet by mouth every 8 (eight) hours as needed for Pain. 12 tablet 0    insulin lispro (HUMALOG KWIKPEN INSULIN) 100 unit/mL pen Inject 6 Units into the skin 3 (three) times daily with meals. Plus sliding scale, MDD: 48 units 15 mL 5    lancets Misc To check BG 2 times daily, to use with insurance preferred meter 100 each 11    pen needle, diabetic (BD ULTRA-FINE GÉNESIS PEN NEEDLE) 32 gauge x 5/32" Ndle Use to inject insulin 4 times daily 200 each 11   [3]   Social History  Socioeconomic History    Marital status:    Tobacco Use    Smoking status: Former     Current packs/day: 0.00     Types: Cigarettes     Quit date: 1980     Years since quittin.1    Smokeless tobacco: Former   Substance and Sexual Activity    Alcohol use: Not Currently    Drug use: Yes     Types: Marijuana     Comment: last 25     Social Drivers of Health     Financial Resource Strain: Low Risk  (2025)    Overall Financial Resource Strain (CARDIA)     Difficulty of Paying Living Expenses: Not hard at all   Food Insecurity: No Food Insecurity (2025)    Hunger Vital Sign     Worried About Running Out of Food in the Last Year: Never true     Ran Out of Food in the Last Year: Never true   Transportation Needs: Patient Declined (2025)    TRANSPORTATION NEEDS     Transportation : Patient declined   Physical Activity: Unknown (2025)    Exercise Vital Sign     Days of Exercise per Week: 0 days   Stress: Stress Concern Present (2025)    [place] " [place] [employer] - Occupational Stress Questionnaire     Feeling of Stress : To some extent   Housing Stability: Low Risk  (2/18/2025)    Housing Stability Vital Sign     Unable to Pay for Housing in the Last Year: No     Homeless in the Last Year: No

## 2025-02-19 NOTE — TRANSFER OF CARE
Anesthesia Transfer of Care Note    Patient: Tej Purdy    Procedure(s) Performed: Procedure(s) (LRB):  EP - diagnostic (N/A)  INSERTION, CARDIAC PACEMAKER, DUAL CHAMBER (N/A)    Patient location: PACU    Anesthesia Type: general    Transport from OR: Transported from OR on 6-10 L/min O2 by face mask with adequate spontaneous ventilation    Post pain: adequate analgesia    Post assessment: no apparent anesthetic complications and tolerated procedure well    Post vital signs: stable    Level of consciousness: lethargic and responds to stimulation    Nausea/Vomiting: no nausea/vomiting    Complications: none    Transfer of care protocol was followed    Last vitals: Visit Vitals  BP (!) 140/62 (BP Location: Right arm, Patient Position: Lying)   Pulse 60   Temp 36.7 °C (98.1 °F) (Temporal)   Resp 13   Ht 6' (1.829 m)   Wt 95 kg (209 lb 7 oz)   SpO2 100%   BMI 28.40 kg/m²

## 2025-02-19 NOTE — DISCHARGE SUMMARY
[facility] - Cardiology Stepdown  Interventional Cardiology  Discharge Summary      Patient Name: Tej Purdy  MRN: [id number]  Admission Date: 2/18/2025  Hospital Length of Stay: 1 days  Discharge Date and Time:  02/19/2025 3:51 PM  Attending Physician: Shilo Carrasco MD  Discharging Provider: Aileen Moralez PA-C  Primary Care Physician: Kole Sharpe MD    HPI:  69 y.o. male with HTN, HLD, T2DM on insulin, paroxysmal Afib on eliquis, PAD, hx HCC s/p liver transplant 1/2022 and RFA/TACE 12/20, HFrEF now recovered to 60-65%, severe AS s/p BAV 2/1/24 who presents for TAVR.      Patient reports having significant symptom improvement and more energy after BAV and GDMT optimization. Recent TTE 10/14/24 shows recovered EF of 60-65% with high-gradient severe AS-- CHRIS 0.7, MG 47.3, peak chandra 4.6. He endorses being able to walk around with use of his cane pretty well. No symptoms of dyspnea on exertion or angina. He denies leg swelling, orthopnea, pnd, syncope. He is still on plavix from stent placement in 12/2024. On eliquis for afib.      He was in the hospital recently with a fall. Neurological workup was unremarkable.           The patient has undergone the following TAVR work-up:   ? ECHO (Date 10.14.24): CHRIS= 0.7 cm2, MG= 46mmHg, Peak Chandra= 4.7 m/s, EF= 55%.   ? LHC (Date 12.23): S/P PCI to OMB  ? STS: 2%   ? Frailty: 2/4   ? Iliacs are >7    ? LVOT area by CTA is 5.6 cm2 (30 mm X 25 mm) and Avg Diameter is 26.7 per my independent review of images on [employer].  ? Incidental findings on CT: Non contributory  ? CT Surgery risk assessment:  Dr. Craig note; high risk for SAVR  ? Rhythm issues: Afib  ? KCCQ/5 meter walk: Done  ? Comorbidities: Cirrhosis, CAD        Procedure(s) (LRB):  EP - diagnostic (N/A)  INSERTION, CARDIAC PACEMAKER, DUAL CHAMBER (N/A)     Indwelling Lines/Drains at time of discharge:  Lines/Drains/Airways       Central Venous Catheter Line  Duration             Introducer 02/18/25 1679  Internal Jugular Right;Subclavian Right 1 day                    Physical Exam  Vitals and nursing note reviewed.   Constitutional:       Appearance: Normal appearance.   HENT:      Head: Normocephalic and atraumatic.   Eyes:      Pupils: Pupils are equal, round, and reactive to light.   Cardiovascular:      Rate and Rhythm: Normal rate and regular rhythm.   Pulmonary:      Effort: Pulmonary effort is normal. No respiratory distress.   Musculoskeletal:      Cervical back: Normal range of motion.   Skin:     General: Skin is warm and dry.   Neurological:      Mental Status: He is alert.         Hospital Course:  Patient brought for planned TAVR that was successful with a 34 mm EvolutFX valve, please see report for full details. Patient tolerated the procedure well. Telemetry post-TAVR noted new LBBB. PPM placed. Patient was instructed to continue home medications and was discharged in stable condition.     Goals of Care Treatment Preferences:  Code Status: Full Code      Consults (From admission, onward)          Status Ordering Provider     Inpatient consult to Electrophysiology  Once        Provider:  (Not yet assigned)    Completed BELLA LEDEZMA            Significant Diagnostic Studies: N/A    Pending Diagnostic Studies:       None          No new Assessment & Plan notes have been filed under this hospital service since the last note was generated.  Service: Interventional Cardiology      Discharged Condition: good    Follow Up:    Patient Instructions:      Cardiac rehab phase II   Standing Status: Future Standing Exp. Date: 02/18/26     Order Specific Question Answer Comments   Department [facility] CARDIAC REHAB    Select qualifying diagnosis: Z95.2 - Presence of prosthetic heart valve      Medications:  Reconciled Home Medications:      Medication List        START taking these medications      doxycycline 100 MG tablet  Commonly known as: VIBRA-TABS  Take 1 tablet (100 mg total) by mouth 2 (two) times daily. for  "10 doses            CONTINUE taking these medications      atorvastatin 80 MG tablet  Commonly known as: LIPITOR  Take 1 tablet (80 mg total) by mouth once daily.     BD ULTRA-FINE GÉNESIS PEN NEEDLE 32 gauge x 5/32" Ndle  Generic drug: pen needle, diabetic  Use to inject insulin 4 times daily     blood sugar diagnostic Strp  To check BG 2 times daily, to use with insurance preferred meter (patient has a True[employer] self-monitoring glucose meter)     blood-glucose meter kit  To check BG 2 times daily, to use with insurance preferred meter     buPROPion 75 MG tablet  Commonly known as: WELLBUTRIN  Take 75 mg by mouth Daily. In the morning     carvediloL 12.5 MG tablet  Commonly known as: COREG  Take 1 tablet (12.5 mg total) by mouth 2 (two) times daily with meals.     cycloSPORINE modified (NEORAL) 25 MG capsule  Commonly known as: NEORAL  Take 2 capsules (50 mg total) by mouth every morning AND 1 capsule (25 mg total) every evening.     diazePAM 5 MG tablet  Commonly known as: VALIUM  Take 1 tablet (5 mg total) by mouth as needed for Anxiety. Take 1 tab 30 min prior to MRI, take 2nd as needed     ELIQUIS 5 mg Tab  Generic drug: apixaban  Take 1 tablet (5 mg total) by mouth 2 (two) times daily.     ENTRESTO 49-51 mg per tablet  Generic drug: sacubitriL-valsartan  Take 1 tablet by mouth 2 (two) times daily.     FeroSuL 325 mg (65 mg iron) Tab tablet  Generic drug: ferrous sulfate  Take 1 tablet (325 mg total) by mouth once daily.     fish oil-omega-3 fatty acids 300-1,000 mg capsule  Take 1 capsule by mouth once daily.     furosemide 20 MG tablet  Commonly known as: LASIX  Take 1 tablet (20 mg total) by mouth once daily.     HumaLOG KwikPen Insulin 100 unit/mL pen  Generic drug: insulin lispro  Inject 6 Units into the skin 3 (three) times daily with meals. Plus sliding scale, MDD: 48 units     HYDROcodone-acetaminophen 5-325 mg per tablet  Commonly known as: NORCO  Take 1 tablet by mouth every 8 (eight) hours as needed " for Pain.     JARDIANCE 10 mg tablet  Generic drug: empagliflozin  Take 1 tablet (10 mg total) by mouth once daily.     lancets Misc  To check BG 2 times daily, to use with insurance preferred meter     LANTUS SOLOSTAR U-100 INSULIN 100 unit/mL (3 mL) Inpn pen  Generic drug: insulin glargine U-100 (Lantus)  Inject 22 Units into the skin every evening.     levETIRAcetam 500 MG Tab  Commonly known as: KEPPRA  Take 1 tablet (500 mg total) by mouth 2 (two) times daily.     magnesium oxide 400 mg (241.3 mg magnesium) tablet  Commonly known as: MAG-OX  Take 1 tablet (400 mg total) by mouth 2 (two) times daily.     multivitamin capsule  Take 1 capsule by mouth once daily.     mycophenolate 250 mg Cap  Commonly known as: CELLCEPT  Take 2 capsules (500 mg total) by mouth 2 (two) times daily.     pantoprazole 40 MG tablet  Commonly known as: PROTONIX  Take 1 tablet (40 mg total) by mouth once daily.     pregabalin 75 MG capsule  Commonly known as: LYRICA  Take 1 capsule (75 mg total) by mouth 2 (two) times daily.     QUEtiapine 100 MG Tab  Commonly known as: SEROQUEL  Take 1 tablet (100 mg total) by mouth every evening.              Time spent on the discharge of patient: 31 minutes    Aileen Moralez PA-C  Interventional Cardiology  [facility] - Cardiology Stepdown

## 2025-02-19 NOTE — ASSESSMENT & PLAN NOTE
Tej Purdy with severe AS s/p TAVR (2/18/2025), by Dr. Nugent. Last EF 10/2024 reported to be %65  Symptomatic severe AS with LVEF of 65% s/p TAVR with new LBBB. Recommend overnight telemetry monitoring. If LBBB persists then will plan for EPS +/-PPM.    Patient has Afib and last time taking eliquis was 3 days ago.   He denies any chest ports, radiation or previous surgeries     TVP: VVI, base rate 40, threshold 1 Mv , output    PLAN:  - LBBB persisted, will need EP study to evaluate the need for PPM  - NPO

## 2025-02-19 NOTE — SUBJECTIVE & OBJECTIVE
Interval History: No 12 lead EKG on system but telemetry still showing wide QRS (measuring up 160 ms)  Will do EPS today     Review of Systems   All other systems reviewed and are negative.    Objective:     Vital Signs (Most Recent):  Temp: 98.3 °F (36.8 °C) (02/19/25 0319)  Pulse: 78 (02/19/25 0600)  Resp: 18 (02/19/25 0319)  BP: (!) 156/79 (02/19/25 0319)  SpO2: 97 % (02/19/25 0319) Vital Signs (24h Range):  Temp:  [97.4 °F (36.3 °C)-98.3 °F (36.8 °C)] 98.3 °F (36.8 °C)  Pulse:  [43-99] 78  Resp:  [12-20] 18  SpO2:  [92 %-100 %] 97 %  BP: ()/(47-79) 156/79     Weight: 95 kg (209 lb 7 oz)  Body mass index is 28.4 kg/m².     SpO2: 97 %        Physical Exam  HENT:      Head: Normocephalic.      Mouth/Throat:      Mouth: Mucous membranes are moist.   Cardiovascular:      Rate and Rhythm: Normal rate and regular rhythm.      Heart sounds: Normal heart sounds, S1 normal and S2 normal.   Pulmonary:      Effort: Pulmonary effort is normal.   Musculoskeletal:         General: Normal range of motion.      Right lower leg: No edema.      Left lower leg: No edema.   Skin:     General: Skin is warm.   Neurological:      Mental Status: He is alert.            Significant Labs: EP:   Recent Labs   Lab 02/18/25  0945      K 4.0      CO2 23   *   BUN 27*   CREATININE 1.1   CALCIUM 9.4   ANIONGAP 10   WBC 6.06   HGB 12.2*   HCT 39.1*   PLT 94*

## 2025-02-19 NOTE — DISCHARGE SUMMARY
[facility] - Cardiology Stepdown  Interventional Cardiology  Discharge Summary      Patient Name: Tej Purdy  MRN: [id number]  Admission Date: 2/18/2025  Hospital Length of Stay: 1 days  Discharge Date and Time:  02/19/2025 3:52 PM  Attending Physician: Shilo Carrasco MD  Discharging Provider: Aileen Moralez PA-C  Primary Care Physician: Kole Sharpe MD    HPI:  69 y.o. male with HTN, HLD, T2DM on insulin, paroxysmal Afib on eliquis, PAD, hx HCC s/p liver transplant 1/2022 and RFA/TACE 12/20, HFrEF now recovered to 60-65%, severe AS s/p BAV 2/1/24 who presents for TAVR.      Patient reports having significant symptom improvement and more energy after BAV and GDMT optimization. Recent TTE 10/14/24 shows recovered EF of 60-65% with high-gradient severe AS-- CHRIS 0.7, MG 47.3, peak chandra 4.6. He endorses being able to walk around with use of his cane pretty well. No symptoms of dyspnea on exertion or angina. He denies leg swelling, orthopnea, pnd, syncope. He is still on plavix from stent placement in 12/2024. On eliquis for afib.      He was in the hospital recently with a fall. Neurological workup was unremarkable.           The patient has undergone the following TAVR work-up:   ? ECHO (Date 10.14.24): CHRIS= 0.7 cm2, MG= 46mmHg, Peak Chandra= 4.7 m/s, EF= 55%.   ? LHC (Date 12.23): S/P PCI to OMB  ? STS: 2%   ? Frailty: 2/4   ? Iliacs are >7    ? LVOT area by CTA is 5.6 cm2 (30 mm X 25 mm) and Avg Diameter is 26.7 per my independent review of images on [employer]  ? Incidental findings on CT: Non contributory  ? CT Surgery risk assessment:  Dr. Craig note; high risk for SAVR  ? Rhythm issues: Afib  ? KCCQ/5 meter walk: Done  ? Comorbidities: Cirrhosis, CAD        Procedure(s) (LRB):  EP - diagnostic (N/A)  INSERTION, CARDIAC PACEMAKER, DUAL CHAMBER (N/A)     Indwelling Lines/Drains at time of discharge:  Lines/Drains/Airways       Central Venous Catheter Line  Duration             Introducer 02/18/25 8134  Internal Jugular Right;Subclavian Right 1 day                    Physical Exam  Vitals and nursing note reviewed.   Constitutional:       Appearance: Normal appearance.   HENT:      Head: Normocephalic and atraumatic.   Eyes:      Pupils: Pupils are equal, round, and reactive to light.   Cardiovascular:      Rate and Rhythm: Normal rate and regular rhythm.   Pulmonary:      Effort: Pulmonary effort is normal. No respiratory distress.   Musculoskeletal:      Cervical back: Normal range of motion.   Skin:     General: Skin is warm and dry.   Neurological:      Mental Status: He is alert.           Hospital Course:  Patient brought for planned TAVR that was successful with a 34 mm EvolutFX valve, please see report for full details. Patient tolerated the procedure well. Telemetry post-TAVR noted new LBBB. PPM placed. Patient was instructed to continue home medications and was discharged in stable condition.     Goals of Care Treatment Preferences:  Code Status: Full Code      Consults (From admission, onward)          Status Ordering Provider     Inpatient consult to Electrophysiology  Once        Provider:  (Not yet assigned)    Completed BELLA LEDEZMA            Significant Diagnostic Studies: N/A    Pending Diagnostic Studies:       None          No new Assessment & Plan notes have been filed under this hospital service since the last note was generated.  Service: Interventional Cardiology      Discharged Condition: good    Follow Up:    Patient Instructions:      Cardiac rehab phase II   Standing Status: Future Standing Exp. Date: 02/18/26     Order Specific Question Answer Comments   Department [facility] CARDIAC REHAB    Select qualifying diagnosis: Z95.2 - Presence of prosthetic heart valve      Medications:  Reconciled Home Medications:      Medication List        START taking these medications      doxycycline 100 MG tablet  Commonly known as: VIBRA-TABS  Take 1 tablet (100 mg total) by mouth 2 (two) times daily. for  "10 doses            CONTINUE taking these medications      atorvastatin 80 MG tablet  Commonly known as: LIPITOR  Take 1 tablet (80 mg total) by mouth once daily.     BD ULTRA-FINE GÉNESIS PEN NEEDLE 32 gauge x 5/32" Ndle  Generic drug: pen needle, diabetic  Use to inject insulin 4 times daily     blood sugar diagnostic Strp  To check BG 2 times daily, to use with insurance preferred meter (patient has a [employer] self-monitoring glucose meter)     blood-glucose meter kit  To check BG 2 times daily, to use with insurance preferred meter     buPROPion 75 MG tablet  Commonly known as: WELLBUTRIN  Take 75 mg by mouth Daily. In the morning     carvediloL 12.5 MG tablet  Commonly known as: COREG  Take 1 tablet (12.5 mg total) by mouth 2 (two) times daily with meals.     cycloSPORINE modified (NEORAL) 25 MG capsule  Commonly known as: NEORAL  Take 2 capsules (50 mg total) by mouth every morning AND 1 capsule (25 mg total) every evening.     diazePAM 5 MG tablet  Commonly known as: VALIUM  Take 1 tablet (5 mg total) by mouth as needed for Anxiety. Take 1 tab 30 min prior to MRI, take 2nd as needed     ELIQUIS 5 mg Tab  Generic drug: apixaban  Take 1 tablet (5 mg total) by mouth 2 (two) times daily.     ENTRESTO 49-51 mg per tablet  Generic drug: sacubitriL-valsartan  Take 1 tablet by mouth 2 (two) times daily.     FeroSuL 325 mg (65 mg iron) Tab tablet  Generic drug: ferrous sulfate  Take 1 tablet (325 mg total) by mouth once daily.     fish oil-omega-3 fatty acids 300-1,000 mg capsule  Take 1 capsule by mouth once daily.     furosemide 20 MG tablet  Commonly known as: LASIX  Take 1 tablet (20 mg total) by mouth once daily.     HumaLOG KwikPen Insulin 100 unit/mL pen  Generic drug: insulin lispro  Inject 6 Units into the skin 3 (three) times daily with meals. Plus sliding scale, MDD: 48 units     HYDROcodone-acetaminophen 5-325 mg per tablet  Commonly known as: NORCO  Take 1 tablet by mouth every 8 (eight) hours as needed " for Pain.     JARDIANCE 10 mg tablet  Generic drug: empagliflozin  Take 1 tablet (10 mg total) by mouth once daily.     lancets Misc  To check BG 2 times daily, to use with insurance preferred meter     LANTUS SOLOSTAR U-100 INSULIN 100 unit/mL (3 mL) Inpn pen  Generic drug: insulin glargine U-100 (Lantus)  Inject 22 Units into the skin every evening.     levETIRAcetam 500 MG Tab  Commonly known as: KEPPRA  Take 1 tablet (500 mg total) by mouth 2 (two) times daily.     magnesium oxide 400 mg (241.3 mg magnesium) tablet  Commonly known as: MAG-OX  Take 1 tablet (400 mg total) by mouth 2 (two) times daily.     multivitamin capsule  Take 1 capsule by mouth once daily.     mycophenolate 250 mg Cap  Commonly known as: CELLCEPT  Take 2 capsules (500 mg total) by mouth 2 (two) times daily.     pantoprazole 40 MG tablet  Commonly known as: PROTONIX  Take 1 tablet (40 mg total) by mouth once daily.     pregabalin 75 MG capsule  Commonly known as: LYRICA  Take 1 capsule (75 mg total) by mouth 2 (two) times daily.     QUEtiapine 100 MG Tab  Commonly known as: SEROQUEL  Take 1 tablet (100 mg total) by mouth every evening.              Time spent on the discharge of patient: 31 minutes    Aileen Moralez PA-C  Interventional Cardiology  [facility] - Cardiology Stepdown

## 2025-02-19 NOTE — PLAN OF CARE
[facility] - Cardiology Stepdown  Discharge Assessment    Primary Care Provider: Kole Sharpe MD     Pt dc before CM could complete initial dc assessment. No CM needs.     Discharge Plan A and Plan B have been determined by review of patient's clinical status, future medical and therapeutic needs, and coverage/benefits for post-acute care in coordination with multidisciplinary team members.     Discharge Assessment (most recent)       BRIEF DISCHARGE ASSESSMENT - 02/19/25 1546          Discharge Planning    Assessment Type Discharge Planning Brief Assessment (P)      Resource/Environmental Concerns none (P)      Support Systems Spouse/significant other (P)      Assistance Needed none (P)      Equipment Currently Used at Home shower chair;cane, straight (P)      Current Living Arrangements home (P)      Patient/Family Anticipates Transition to home (P)      Patient/Family Anticipated Services at Transition none (P)      DME Needed Upon Discharge  none (P)      Discharge Plan A Home with family (P)      Discharge Plan B Home (P)         Physical Activity    On average, how many days per week do you engage in moderate to strenuous exercise (like a brisk walk)? 0 days (P)      On average, how many minutes do you engage in exercise at this level? 0 min (P)         Financial Resource Strain    How hard is it for you to pay for the very basics like food, housing, medical care, and heating? Not very hard (P)         Housing Stability    In the last 12 months, was there a time when you were not able to pay the mortgage or rent on time? No (P)      At any time in the past 12 months, were you homeless or living in a shelter (including now)? No (P)         Transportation Needs    Has the lack of transportation kept you from medical appointments, meetings, work or from getting things needed for daily living? No (P)         Food Insecurity    Within the past 12 months, you worried that your food would run out before you got  the money to buy more. Never true (P)      Within the past 12 months, the food you bought just didn't last and you didn't have money to get more. Never true (P)         Stress    Do you feel stress - tense, restless, nervous, or anxious, or unable to sleep at night because your mind is troubled all the time - these days? To some extent (P)         Social Isolation    How often do you feel lonely or isolated from those around you?  Never (P)         Alcohol Use    Q1: How often do you have a drink containing alcohol? Never (P)      Q2: How many drinks containing alcohol do you have on a typical day when you are drinking? Patient does not drink (P)      Q3: How often do you have six or more drinks on one occasion? Never (P)         Utilities    In the past 12 months has the electric, gas, oil, or water company threatened to shut off services in your home? No (P)         Health Literacy    How often do you need to have someone help you when you read instructions, pamphlets, or other written material from your doctor or pharmacy? Rarely (P)                    Jah Preston, MSN  RN Case Management  [phone]

## 2025-02-19 NOTE — PLAN OF CARE
[facility] - Cardiology Stepdown  Discharge Final Note    Primary Care Provider: Kole Sharpe MD    Expected Discharge Date: 2/19/2025    Patient discharged home with family. Family provided transportation.     Patient's bedside nurse and pt notified of the above.    Discharge Plan A and Plan B have been determined by review of patient's clinical status, future medical and therapeutic needs, and coverage/benefits for post-acute care in coordination with multidisciplinary team members.    Final Discharge Note (most recent)       Final Note - 02/19/25 1548          Final Note    Assessment Type Final Discharge Note (P)      Anticipated Discharge Disposition Home or Self Care (P)      Hospital Resources/Appts/Education Provided Provided patient/caregiver with written discharge plan information (P)         Post-Acute Status    [facility] SNP (P)      Discharge Delays None known at this time (P)                      Important Message from Medicare                 Future Appointments   Date Time Provider Department Center   3/17/2025  2:40 PM [facility]   3/18/2025 12:30 PM LAB[facility] [place]   3/18/2025  1:00 PM CV [facility] ECHO [facility] C[facility] [place]   3/18/2025  2:30 PM Tari Telles MD [facility] ENDOCR [place]   3/24/2025 10:00 AM Aileen Moralez, [facility]   5/5/2025  8:00 AM LAB, [place] [facility]     Jah Preston, WILLIAM  RN Case Management  [phone]

## 2025-02-19 NOTE — NURSING TRANSFER
Nursing Transfer Note      2/19/2025   3:18 PM    Nurse giving handoff:radha lackey  Nurse receiving handoff:radha pichardo    Reason patient is being transferred: per md order    Transfer To: [id number]    Transfer via stretcher    Transfer with 2L O2, cardiac monitoring    Transported by pct    Order for Tele Monitor? No    Additional Lines: Oxygen    Medicines sent: n/a    Any special needs or follow-up needed: n/a    Patient belongings transferred with patient: No    Chart send with patient: Yes    Notified: spouse

## 2025-02-19 NOTE — PLAN OF CARE
Cm attempted to complete initial dc assessment. Pt off the unit. CM will attempt later.     Discharge Plan A and Plan B have been determined by review of patient's clinical status, future medical and therapeutic needs, and coverage/benefits for post-acute care in coordination with multidisciplinary team members.     Jah Preston, MSN  RN Case Management  [phone]

## 2025-02-20 ENCOUNTER — TELEPHONE (OUTPATIENT)
Dept: ELECTROPHYSIOLOGY | Facility: CLINIC | Age: 70
End: 2025-02-20
Payer: MEDICARE

## 2025-02-20 ENCOUNTER — PATIENT MESSAGE (OUTPATIENT)
Dept: CARDIOLOGY | Facility: CLINIC | Age: 70
End: 2025-02-20
Payer: MEDICARE

## 2025-02-20 ENCOUNTER — PATIENT OUTREACH (OUTPATIENT)
Dept: ADMINISTRATIVE | Facility: CLINIC | Age: 70
End: 2025-02-20
Payer: MEDICARE

## 2025-02-20 DIAGNOSIS — I44.7 LBBB (LEFT BUNDLE BRANCH BLOCK): ICD-10-CM

## 2025-02-20 DIAGNOSIS — Z95.0 CARDIAC PACEMAKER IN SITU: Primary | ICD-10-CM

## 2025-02-20 NOTE — ANESTHESIA POSTPROCEDURE EVALUATION
Anesthesia Post Evaluation    Patient: Tej Purdy    Procedure(s) Performed: Procedure(s) (LRB):  EP - diagnostic (N/A)  INSERTION, CARDIAC PACEMAKER, DUAL CHAMBER (N/A)    Final Anesthesia Type: general (Natural airway)      Patient location during evaluation: PACU  Patient participation: Yes- Able to Participate  Level of consciousness: awake and alert  Post-procedure vital signs: reviewed and stable  Pain management: adequate  Airway patency: patent    PONV status at discharge: No PONV  Anesthetic complications: no      Cardiovascular status: hemodynamically stable  Respiratory status: unassisted  Hydration status: euvolemic  Follow-up not needed.              Vitals Value Taken Time   /54 02/19/25 15:30   Temp 36.4 °C (97.6 °F) 02/19/25 15:30   Pulse 66 02/19/25 15:58   Resp 14 02/19/25 15:30   SpO2 96 % 02/19/25 15:30         No case tracking events are documented in the log.      Pain/Cordelia Score: Pain Rating Prior to Med Admin: 6 (2/19/2025  2:15 PM)  Pain Rating Post Med Admin: 4 (2/19/2025  2:30 PM)  Cordelia Score: 10 (2/19/2025  1:30 PM)

## 2025-02-20 NOTE — TELEPHONE ENCOUNTER
The patient states he is taking Tylenol 325 mg per tablet as needed. Advised patient to not exceed more than 4000 mg of acetaminophen (Tylenol) in a 24 hour period, he voiced understanding.

## 2025-02-20 NOTE — PROGRESS NOTES
C3 nurse attempted to contact Tej Purdy  for a TCC post hospital discharge follow up call. The patient is unable to conduct the call @ this time. The patient requested a callback.    The patient does not have a scheduled HOSFU appointment within 5-7 days post hospital discharge date 02/19/2025. Message sent to Physician staff to assist with HOSFU appointment scheduling.

## 2025-02-20 NOTE — OP NOTE
DATE OF PROCEDURE: 02/18/2025    ATTENDING SURGEONS: Shilo Varghese MD and Alberto Vicente M.D.    PREOPERATIVE DIAGNOSES:  1. Severe aortic stenosis.    POSTOPERATIVE DIAGNOSES:  1. Severe aortic stenosis      OPERATION PERFORMED: Transcatheter aortic valve insertion via transfemoral approach using a 34mm Evolute FX+ .    ANESTHESIA: MAC    ESTIMATED BLOOD LOSS: 20 mL.    BRIEF HISTORY: This patient is a 69-year-old man with severe aortic stenosis. The patient has had progressive dyspnea on exertion. This prompted a thoughtful and thorough evaluation, which demonstrated severe aortic stenosis. Given the comorbid conditions, and the patients preference, a transcatheter valve insertion was recommended. The patient now presents for transcatheter aortic valve insertion.    PROCEDURE: After obtaining informed and written consent, the patient was brought to the cath lab and placed on the cath table in supine position. All the pressure points were protected. After induction of adequate anesthesia, a transvenous pacing wire was placed by anesthesia. Femoral vascular access was obtained.  A wire was advanced across the aortic valve. It was exchanged for stiff wire, and a 34mm Evolute FX+  valve was advanced to the level of the aortic annulus. Once the team was satisfied that the valve was in proper position, it was deployed under rapid pacing. Excellent positioning was confirmed on echo and fluoro/angio.  Post-procedure echo demonstrated no aortic insufficiency. The femoral access sites were controlled using percutaneous techniques. The patient tolerated the procedure well, there were no complications.   At the conclusion of the case, sponge and instrument counts were correct.    @Alberto Vicente MD  Cardiac Surgery@

## 2025-02-22 DIAGNOSIS — S92.024A CLOSED NONDISPLACED FRACTURE OF ANTERIOR PROCESS OF RIGHT CALCANEUS, INITIAL ENCOUNTER: ICD-10-CM

## 2025-02-24 ENCOUNTER — OFFICE VISIT (OUTPATIENT)
Dept: PRIMARY CARE CLINIC | Facility: CLINIC | Age: 70
End: 2025-02-24
Payer: MEDICARE

## 2025-02-24 VITALS
HEIGHT: 72 IN | DIASTOLIC BLOOD PRESSURE: 70 MMHG | WEIGHT: 211.63 LBS | TEMPERATURE: 99 F | BODY MASS INDEX: 28.66 KG/M2 | HEART RATE: 63 BPM | OXYGEN SATURATION: 97 % | SYSTOLIC BLOOD PRESSURE: 182 MMHG

## 2025-02-24 DIAGNOSIS — I68.0 CEREBRAL AMYLOID ANGIOPATHY: ICD-10-CM

## 2025-02-24 DIAGNOSIS — I35.0 SEVERE AORTIC STENOSIS: Primary | ICD-10-CM

## 2025-02-24 DIAGNOSIS — Z95.3 S/P TAVR (TRANSCATHETER AORTIC VALVE REPLACEMENT): ICD-10-CM

## 2025-02-24 DIAGNOSIS — Z94.4 S/P LIVER TRANSPLANT: Chronic | ICD-10-CM

## 2025-02-24 DIAGNOSIS — I25.10 CORONARY ARTERY DISEASE INVOLVING NATIVE CORONARY ARTERY OF NATIVE HEART WITHOUT ANGINA PECTORIS: Chronic | ICD-10-CM

## 2025-02-24 DIAGNOSIS — I44.7 LBBB (LEFT BUNDLE BRANCH BLOCK): ICD-10-CM

## 2025-02-24 DIAGNOSIS — Z79.60 LONG-TERM USE OF IMMUNOSUPPRESSANT MEDICATION: ICD-10-CM

## 2025-02-24 DIAGNOSIS — Z79.4 TYPE 2 DIABETES MELLITUS WITH DIABETIC NEPHROPATHY, WITH LONG-TERM CURRENT USE OF INSULIN: ICD-10-CM

## 2025-02-24 DIAGNOSIS — I48.91 ATRIAL FIBRILLATION WITH RAPID VENTRICULAR RESPONSE: ICD-10-CM

## 2025-02-24 DIAGNOSIS — I50.22 CHRONIC HFREF (HEART FAILURE WITH REDUCED EJECTION FRACTION): ICD-10-CM

## 2025-02-24 DIAGNOSIS — E11.21 TYPE 2 DIABETES MELLITUS WITH DIABETIC NEPHROPATHY, WITH LONG-TERM CURRENT USE OF INSULIN: ICD-10-CM

## 2025-02-24 DIAGNOSIS — C22.1 CHOLANGIOCARCINOMA: ICD-10-CM

## 2025-02-24 DIAGNOSIS — E78.2 MIXED HYPERLIPIDEMIA: ICD-10-CM

## 2025-02-24 DIAGNOSIS — E85.4 CEREBRAL AMYLOID ANGIOPATHY: ICD-10-CM

## 2025-02-24 DIAGNOSIS — I10 PRIMARY HYPERTENSION: Chronic | ICD-10-CM

## 2025-02-24 PROCEDURE — 3078F DIAST BP <80 MM HG: CPT | Mod: CPTII,S$GLB,, | Performed by: STUDENT IN AN ORGANIZED HEALTH CARE EDUCATION/TRAINING PROGRAM

## 2025-02-24 PROCEDURE — 1126F AMNT PAIN NOTED NONE PRSNT: CPT | Mod: CPTII,S$GLB,, | Performed by: STUDENT IN AN ORGANIZED HEALTH CARE EDUCATION/TRAINING PROGRAM

## 2025-02-24 PROCEDURE — 1111F DSCHRG MED/CURRENT MED MERGE: CPT | Mod: CPTII,S$GLB,, | Performed by: STUDENT IN AN ORGANIZED HEALTH CARE EDUCATION/TRAINING PROGRAM

## 2025-02-24 PROCEDURE — 1159F MED LIST DOCD IN RCRD: CPT | Mod: CPTII,S$GLB,, | Performed by: STUDENT IN AN ORGANIZED HEALTH CARE EDUCATION/TRAINING PROGRAM

## 2025-02-24 PROCEDURE — 3044F HG A1C LEVEL LT 7.0%: CPT | Mod: CPTII,S$GLB,, | Performed by: STUDENT IN AN ORGANIZED HEALTH CARE EDUCATION/TRAINING PROGRAM

## 2025-02-24 PROCEDURE — 3288F FALL RISK ASSESSMENT DOCD: CPT | Mod: CPTII,S$GLB,, | Performed by: STUDENT IN AN ORGANIZED HEALTH CARE EDUCATION/TRAINING PROGRAM

## 2025-02-24 PROCEDURE — 3008F BODY MASS INDEX DOCD: CPT | Mod: CPTII,S$GLB,, | Performed by: STUDENT IN AN ORGANIZED HEALTH CARE EDUCATION/TRAINING PROGRAM

## 2025-02-24 PROCEDURE — 99214 OFFICE O/P EST MOD 30 MIN: CPT | Mod: S$GLB,,, | Performed by: STUDENT IN AN ORGANIZED HEALTH CARE EDUCATION/TRAINING PROGRAM

## 2025-02-24 PROCEDURE — 1101F PT FALLS ASSESS-DOCD LE1/YR: CPT | Mod: CPTII,S$GLB,, | Performed by: STUDENT IN AN ORGANIZED HEALTH CARE EDUCATION/TRAINING PROGRAM

## 2025-02-24 PROCEDURE — 3077F SYST BP >= 140 MM HG: CPT | Mod: CPTII,S$GLB,, | Performed by: STUDENT IN AN ORGANIZED HEALTH CARE EDUCATION/TRAINING PROGRAM

## 2025-02-24 PROCEDURE — 99999 PR PBB SHADOW E&M-EST. PATIENT-LVL V: CPT | Mod: PBBFAC,,, | Performed by: STUDENT IN AN ORGANIZED HEALTH CARE EDUCATION/TRAINING PROGRAM

## 2025-02-24 RX ORDER — PREGABALIN 75 MG/1
75 CAPSULE ORAL 2 TIMES DAILY
Qty: 60 CAPSULE | Refills: 0 | OUTPATIENT
Start: 2025-02-24 | End: 2025-03-26

## 2025-02-24 NOTE — PROGRESS NOTES
Primary Care  Transition of Care - In Person  2/24/2025      Patient is a 69 y.o.     Tej Purdy has   Past Medical History:   Diagnosis Date    Acute appendicitis 06/02/2023    Aortic stenosis     Atrial fibrillation     johny/cardioversion on 1/26/24    Calculus of ureter 12/22/2023    CHF (congestive heart failure)     Cholangiocarcinoma 03/16/2022    s/p liver transplant    Cirrhosis 11/23/2020    s/p transplant 2022 for CASTILLO cirrhosis and cholangiocarcinoma    Diabetes mellitus, type 2     c/b Diabetic neuropathy    HTN (hypertension) 11/23/2020    Hyperlipidemia 11/23/2020    Kidney stones 11/23/2020    S/p lithotripsy 2020    PAD (peripheral artery disease)     Skin cancer 11/23/2020       Medications  Outpatient Medications Marked as Taking for the 2/24/25 encounter (Office Visit) with Robinson Anderson MD   Medication Sig Dispense Refill    apixaban (ELIQUIS) 5 mg Tab Take 1 tablet (5 mg total) by mouth 2 (two) times daily. 180 tablet 3    atorvastatin (LIPITOR) 80 MG tablet Take 1 tablet (80 mg total) by mouth once daily. 90 tablet 3    blood sugar diagnostic Strp To check BG 2 times daily, to use with insurance preferred meter (patient has a [employer] self-monitoring glucose meter) 100 strip 11    blood-glucose meter kit To check BG 2 times daily, to use with insurance preferred meter 1 each 0    buPROPion (WELLBUTRIN) 75 MG tablet Take 75 mg by mouth Daily. In the morning      carvediloL (COREG) 12.5 MG tablet Take 1 tablet (12.5 mg total) by mouth 2 (two) times daily with meals. 180 tablet 3    cycloSPORINE modified, NEORAL, (NEORAL) 25 MG capsule Take 2 capsules (50 mg total) by mouth every morning AND 1 capsule (25 mg total) every evening. 90 capsule 11    diazePAM (VALIUM) 5 MG tablet Take 1 tablet (5 mg total) by mouth as needed for Anxiety. Take 1 tab 30 min prior to MRI, take 2nd as needed 2 tablet 0    doxycycline (VIBRA-TABS) 100 MG tablet Take 1 tablet (100 mg total) by mouth 2 (two)  "times daily. for 10 doses 10 tablet 0    empagliflozin (JARDIANCE) 10 mg tablet Take 1 tablet (10 mg total) by mouth once daily. 90 tablet 3    ferrous sulfate (IRON) 325 mg (65 mg iron) Tab tablet Take 1 tablet (325 mg total) by mouth once daily. 30 tablet 2    furosemide (LASIX) 20 MG tablet Take 1 tablet (20 mg total) by mouth once daily. 90 tablet 3    HYDROcodone-acetaminophen (NORCO) 5-325 mg per tablet Take 1 tablet by mouth every 8 (eight) hours as needed for Pain. 12 tablet 0    insulin glargine U-100, Lantus, (LANTUS SOLOSTAR U-100 INSULIN) 100 unit/mL (3 mL) InPn pen Inject 22 Units into the skin every evening. 15 mL 11    lancets Misc To check BG 2 times daily, to use with insurance preferred meter 100 each 11    levETIRAcetam (KEPPRA) 500 MG Tab Take 1 tablet (500 mg total) by mouth 2 (two) times daily. 60 tablet 11    magnesium oxide (MAG-OX) 400 mg (241.3 mg magnesium) tablet Take 1 tablet (400 mg total) by mouth 2 (two) times daily. 60 tablet 11    multivitamin capsule Take 1 capsule by mouth once daily.      mycophenolate (CELLCEPT) 250 mg Cap Take 2 capsules (500 mg total) by mouth 2 (two) times daily. 120 capsule 11    omega-3 fatty acids/fish oil (FISH OIL-OMEGA-3 FATTY ACIDS) 300-1,000 mg capsule Take 1 capsule by mouth once daily.       pantoprazole (PROTONIX) 40 MG tablet Take 1 tablet (40 mg total) by mouth once daily. 90 tablet 1    pen needle, diabetic (BD ULTRA-FINE GÉNESIS PEN NEEDLE) 32 gauge x 5/32" Ndle Use to inject insulin 4 times daily 200 each 11    QUEtiapine (SEROQUEL) 100 MG Tab Take 1 tablet (100 mg total) by mouth every evening. 90 tablet 1    sacubitriL-valsartan (ENTRESTO) 49-51 mg per tablet Take 1 tablet by mouth 2 (two) times daily. 180 tablet 3     Current Facility-Administered Medications for the 2/24/25 encounter (Office Visit) with Robinson Anderson MD   Medication Dose Route Frequency Provider Last Rate Last Admin    sodium chloride 0.9% flush 10 mL  10 mL Intravenous " "Shilo Roberson MD           Summary of Hospitalization  Date of Admission - 2/18/25  Date of D/C- 2/19/25  D/C diagnoses -   Aortic stenosis   LBBB     I have reviewed the discharge summary    A summary of their hospitalization is as follows     Per Aileen Moralez PA-C    "69 y.o. male with HTN, HLD, T2DM on insulin, paroxysmal Afib on eliquis, PAD, hx HCC s/p liver transplant 1/2022 and RFA/TACE 12/20, HFrEF now recovered to 60-65%, severe AS s/p BAV 2/1/24 who presents for TAVR.      Patient reports having significant symptom improvement and more energy after BAV and GDMT optimization. Recent TTE 10/14/24 shows recovered EF of 60-65% with high-gradient severe AS-- CHRIS 0.7, MG 47.3, peak parker 4.6. He endorses being able to walk around with use of his cane pretty well. No symptoms of dyspnea on exertion or angina. He denies leg swelling, orthopnea, pnd, syncope. He is still on plavix from stent placement in 12/2024. On eliquis for afib.      He was in the hospital recently with a fall. Neurological workup was unremarkable."      Physical Exam   Vitals:    02/24/25 1027 02/24/25 1102   BP: (!) 192/70 (!) 182/70   BP Location: Right arm    Patient Position: Sitting    Pulse: 63    Temp: 98.7 °F (37.1 °C)    SpO2: 97%    Weight: 96 kg (211 lb 10.3 oz)    Height: 6' (1.829 m)          Physical Exam  Vitals reviewed.   Constitutional:       General: He is not in acute distress.  Eyes:      Pupils: Pupils are equal, round, and reactive to light.   Cardiovascular:      Rate and Rhythm: Normal rate and regular rhythm.      Pulses: Normal pulses.      Heart sounds: Normal heart sounds.   Pulmonary:      Effort: Pulmonary effort is normal.      Breath sounds: Normal breath sounds.   Abdominal:      General: Abdomen is flat. Bowel sounds are normal.      Palpations: Abdomen is soft.   Musculoskeletal:      Right lower leg: No edema.      Left lower leg: No edema.      Comments: No groin swelling    Skin:     Findings: " No erythema.           Results  Lab Results   Component Value Date    CREATININE 1.1 02/18/2025     Lab Results   Component Value Date     02/18/2025    K 4.0 02/18/2025     02/18/2025    CO2 23 02/18/2025    ANIONGAP 10 02/18/2025     Lab Results   Component Value Date    WBC 6.06 02/18/2025    RBC 4.63 02/18/2025    HGB 12.2 (L) 02/18/2025    HCT 39.1 (L) 02/18/2025    MCV 84 02/18/2025    MCH 26.3 (L) 02/18/2025    MCHC 31.2 (L) 02/18/2025    RDW 17.7 (H) 02/18/2025    PLT 94 (L) 02/18/2025     Lab Results   Component Value Date     (H) 02/18/2025     Lab Results   Component Value Date    HGBA1C 6.6 (H) 01/09/2025    HGBA1C 5.1 09/18/2024    HGBA1C 7.5 (H) 01/26/2024       Imaging reviewed     Assessment and Plan     1. Severe aortic stenosis  2. S/P TAVR (transcatheter aortic valve replacement)   Patient tolerated procedure without immediate complication    F/u with cardiology     3. Type 2 diabetes mellitus with diabetic nephropathy, with long-term current use of insulin   Well controlled with current insulin regimen    4. Chronic HFrEF (heart failure with reduced ejection fraction)   No signs of ADHF on exam.  Continue Lasix, Jardiance, carvedilol, and Entresto    5. Coronary artery disease involving native coronary artery of native heart without angina pectoris  6. LBBB (left bundle branch block)   Continue Lipitor.  Follow up with Cardiology as above     7. S/P liver transplant  8. Cholangiocarcinoma  9. Long-term use of immunosuppressant medication   Patient followed by hepatology    10. Primary hypertension   Poorly controlled today.  Although does appear to be an outlier.  Patient currently asymptomatic    11. Atrial fibrillation with rapid ventricular response   S/p ppm.  Continue Eliquis    12. Mixed hyperlipidemia   Lipitor as above    13. Seizure-like activity    Continued Keppra  Patient was supposed to discontinue Wellbutrin as recommended by Neurology during previous  hospitalization     14. CAA    Patient was advised to f/u with Dr. Chawla before considering discontinuing Eliquis            Follow Up [facility] (with who? when?): No follow-ups on file.        Extended Emergency Contact Information  Primary Emergency Contact: Aniya Berkowitz  Mobile Phone: [phone]  Relation: Significant other      Robinson Anderson MD   Internal Medicine  2/24/2025 - 11:12 AM    I spent a total of 30 minutes on the day of the visit.This includes face to face time and non-face to face time preparing to see the patient (eg, review of tests), obtaining and/or reviewing separately obtained history, documenting clinical information in the electronic or other health record, independently interpreting results and communicating results to the patient/family/caregiver, or care coordinator.    While patients have the right to access their medical record, it is essential to recognize that progress notes primarily serve as a means of communication among healthcare professionals.

## 2025-02-25 ENCOUNTER — PATIENT MESSAGE (OUTPATIENT)
Dept: PRIMARY CARE CLINIC | Facility: CLINIC | Age: 70
End: 2025-02-25
Payer: MEDICARE

## 2025-02-25 ENCOUNTER — TELEPHONE (OUTPATIENT)
Dept: PRIMARY CARE CLINIC | Facility: CLINIC | Age: 70
End: 2025-02-25
Payer: MEDICARE

## 2025-02-25 NOTE — TELEPHONE ENCOUNTER
----- Message from Robinson Anderson MD sent at 2/25/2025 12:11 PM CST -----  Please ask patient if he stopped Wellbutrin as recommended by neurology the last time he was in the hospital

## 2025-02-26 ENCOUNTER — CLINICAL SUPPORT (OUTPATIENT)
Dept: CARDIOLOGY | Facility: HOSPITAL | Age: 70
End: 2025-02-26
Attending: INTERNAL MEDICINE
Payer: MEDICARE

## 2025-02-26 DIAGNOSIS — I48.91 ATRIAL FIBRILLATION WITH RAPID VENTRICULAR RESPONSE: ICD-10-CM

## 2025-02-26 DIAGNOSIS — I49.8 OTHER SPECIFIED CARDIAC ARRHYTHMIAS: ICD-10-CM

## 2025-02-26 DIAGNOSIS — S92.024A CLOSED NONDISPLACED FRACTURE OF ANTERIOR PROCESS OF RIGHT CALCANEUS, INITIAL ENCOUNTER: ICD-10-CM

## 2025-02-26 DIAGNOSIS — Z95.0 CARDIAC PACEMAKER IN SITU: ICD-10-CM

## 2025-02-26 DIAGNOSIS — I44.7 LBBB (LEFT BUNDLE BRANCH BLOCK): ICD-10-CM

## 2025-02-26 DIAGNOSIS — I44.7 LBBB (LEFT BUNDLE BRANCH BLOCK): Primary | ICD-10-CM

## 2025-02-26 LAB
OHS QRS DURATION: 158 MS
OHS QTC CALCULATION: 505 MS

## 2025-02-26 PROCEDURE — 93280 PM DEVICE PROGR EVAL DUAL: CPT | Mod: 26,,, | Performed by: INTERNAL MEDICINE

## 2025-02-26 PROCEDURE — 93005 ELECTROCARDIOGRAM TRACING: CPT | Performed by: INTERNAL MEDICINE

## 2025-02-26 PROCEDURE — 93280 PM DEVICE PROGR EVAL DUAL: CPT

## 2025-02-26 PROCEDURE — 93010 ELECTROCARDIOGRAM REPORT: CPT | Mod: ,,, | Performed by: INTERNAL MEDICINE

## 2025-02-26 RX ORDER — PREGABALIN 75 MG/1
75 CAPSULE ORAL 2 TIMES DAILY
Qty: 60 CAPSULE | Refills: 0 | Status: SHIPPED | OUTPATIENT
Start: 2025-02-26 | End: 2025-03-28

## 2025-02-28 LAB — OHS CV DC REMOTE DEVICE TYPE: NORMAL

## 2025-03-05 ENCOUNTER — TELEPHONE (OUTPATIENT)
Dept: CARDIAC REHAB | Facility: CLINIC | Age: 70
End: 2025-03-05
Payer: MEDICARE

## 2025-03-10 DIAGNOSIS — E78.00 HYPERCHOLESTERINEMIC XANTHOMATOSIS: ICD-10-CM

## 2025-03-10 DIAGNOSIS — E78.00 HYPERCHOLESTEREMIA: ICD-10-CM

## 2025-03-10 DIAGNOSIS — E11.9 DIABETES MELLITUS WITHOUT COMPLICATION: ICD-10-CM

## 2025-03-10 DIAGNOSIS — Z95.2 HEART VALVE REPLACED BY TRANSPLANT: Primary | ICD-10-CM

## 2025-03-12 DIAGNOSIS — I48.19 PERSISTENT ATRIAL FIBRILLATION: Primary | ICD-10-CM

## 2025-03-14 ENCOUNTER — TELEPHONE (OUTPATIENT)
Dept: CARDIAC REHAB | Facility: CLINIC | Age: 70
End: 2025-03-14
Payer: MEDICARE

## 2025-03-14 NOTE — TELEPHONE ENCOUNTER
Called pt as he missed his CPX and lab appointments. Pt stated he had  failed to fast that day. Lab and CPX rescheduled to 4/2/25 and orientation to 4/8/25. Copies of appoitments mailed to patient as well as orietnation appt sheet and health status questionnaire.

## 2025-03-16 DIAGNOSIS — E11.65 TYPE 2 DIABETES MELLITUS WITH HYPERGLYCEMIA, WITH LONG-TERM CURRENT USE OF INSULIN: ICD-10-CM

## 2025-03-16 DIAGNOSIS — Z79.4 TYPE 2 DIABETES MELLITUS WITH HYPERGLYCEMIA, WITH LONG-TERM CURRENT USE OF INSULIN: ICD-10-CM

## 2025-03-16 NOTE — TELEPHONE ENCOUNTER
No care due was identified.  [facility] Embedded Care Due Messages. Reference number: [id number].   3/16/2025 5:41:19 PM CDT

## 2025-03-17 ENCOUNTER — OFFICE VISIT (OUTPATIENT)
Dept: PRIMARY CARE CLINIC | Facility: CLINIC | Age: 70
End: 2025-03-17
Payer: MEDICARE

## 2025-03-17 ENCOUNTER — TELEPHONE (OUTPATIENT)
Dept: PRIMARY CARE CLINIC | Facility: CLINIC | Age: 70
End: 2025-03-17

## 2025-03-17 VITALS
HEIGHT: 72 IN | WEIGHT: 208.75 LBS | HEART RATE: 51 BPM | BODY MASS INDEX: 28.27 KG/M2 | OXYGEN SATURATION: 95 % | DIASTOLIC BLOOD PRESSURE: 62 MMHG | SYSTOLIC BLOOD PRESSURE: 130 MMHG

## 2025-03-17 DIAGNOSIS — Z79.4 TYPE 2 DIABETES MELLITUS WITH HYPERGLYCEMIA, WITH LONG-TERM CURRENT USE OF INSULIN: Primary | ICD-10-CM

## 2025-03-17 DIAGNOSIS — I10 PRIMARY HYPERTENSION: ICD-10-CM

## 2025-03-17 DIAGNOSIS — E11.65 TYPE 2 DIABETES MELLITUS WITH HYPERGLYCEMIA, WITH LONG-TERM CURRENT USE OF INSULIN: Primary | ICD-10-CM

## 2025-03-17 DIAGNOSIS — E78.2 MIXED HYPERLIPIDEMIA: ICD-10-CM

## 2025-03-17 PROCEDURE — 3008F BODY MASS INDEX DOCD: CPT | Mod: CPTII,S$GLB,, | Performed by: FAMILY MEDICINE

## 2025-03-17 PROCEDURE — 99214 OFFICE O/P EST MOD 30 MIN: CPT | Mod: S$GLB,,, | Performed by: FAMILY MEDICINE

## 2025-03-17 PROCEDURE — 3061F NEG MICROALBUMINURIA REV: CPT | Mod: CPTII,S$GLB,, | Performed by: FAMILY MEDICINE

## 2025-03-17 PROCEDURE — 3078F DIAST BP <80 MM HG: CPT | Mod: CPTII,S$GLB,, | Performed by: FAMILY MEDICINE

## 2025-03-17 PROCEDURE — 99999 PR PBB SHADOW E&M-EST. PATIENT-LVL V: CPT | Mod: PBBFAC,,, | Performed by: FAMILY MEDICINE

## 2025-03-17 PROCEDURE — 3075F SYST BP GE 130 - 139MM HG: CPT | Mod: CPTII,S$GLB,, | Performed by: FAMILY MEDICINE

## 2025-03-17 PROCEDURE — 3044F HG A1C LEVEL LT 7.0%: CPT | Mod: CPTII,S$GLB,, | Performed by: FAMILY MEDICINE

## 2025-03-17 PROCEDURE — 1101F PT FALLS ASSESS-DOCD LE1/YR: CPT | Mod: CPTII,S$GLB,, | Performed by: FAMILY MEDICINE

## 2025-03-17 PROCEDURE — 3066F NEPHROPATHY DOC TX: CPT | Mod: CPTII,S$GLB,, | Performed by: FAMILY MEDICINE

## 2025-03-17 PROCEDURE — 3288F FALL RISK ASSESSMENT DOCD: CPT | Mod: CPTII,S$GLB,, | Performed by: FAMILY MEDICINE

## 2025-03-17 PROCEDURE — 1111F DSCHRG MED/CURRENT MED MERGE: CPT | Mod: CPTII,S$GLB,, | Performed by: FAMILY MEDICINE

## 2025-03-17 PROCEDURE — 1126F AMNT PAIN NOTED NONE PRSNT: CPT | Mod: CPTII,S$GLB,, | Performed by: FAMILY MEDICINE

## 2025-03-17 RX ORDER — FUROSEMIDE 20 MG/1
20 TABLET ORAL DAILY
Qty: 90 TABLET | Refills: 3 | Status: SHIPPED | OUTPATIENT
Start: 2025-03-17 | End: 2026-03-17

## 2025-03-17 RX ORDER — INSULIN GLARGINE 100 [IU]/ML
22 INJECTION, SOLUTION SUBCUTANEOUS NIGHTLY
Qty: 15 ML | Refills: 11 | Status: SHIPPED | OUTPATIENT
Start: 2025-03-17 | End: 2025-03-17 | Stop reason: SDUPTHER

## 2025-03-17 RX ORDER — PANTOPRAZOLE SODIUM 40 MG/1
40 TABLET, DELAYED RELEASE ORAL DAILY
Qty: 90 TABLET | Refills: 1 | Status: SHIPPED | OUTPATIENT
Start: 2025-03-17

## 2025-03-17 RX ORDER — CARVEDILOL 12.5 MG/1
12.5 TABLET ORAL 2 TIMES DAILY WITH MEALS
Qty: 180 TABLET | Refills: 3 | Status: SHIPPED | OUTPATIENT
Start: 2025-03-17 | End: 2026-03-17

## 2025-03-17 RX ORDER — INSULIN GLARGINE 100 [IU]/ML
22 INJECTION, SOLUTION SUBCUTANEOUS NIGHTLY
Qty: 15 ML | Refills: 11 | Status: SHIPPED | OUTPATIENT
Start: 2025-03-17

## 2025-03-17 RX ORDER — ATORVASTATIN CALCIUM 80 MG/1
80 TABLET, FILM COATED ORAL DAILY
Qty: 90 TABLET | Refills: 3 | Status: SHIPPED | OUTPATIENT
Start: 2025-03-17 | End: 2026-03-17

## 2025-03-17 NOTE — TELEPHONE ENCOUNTER
Hello, pt seen today for f/u with his PCP. He is unable to log in to his [facility] to monitor his pacemaker. Can you please assist pt?     Pt had aortic valve replacement on 02/18/25

## 2025-03-18 ENCOUNTER — OFFICE VISIT (OUTPATIENT)
Dept: ENDOCRINOLOGY | Facility: CLINIC | Age: 70
End: 2025-03-18
Payer: MEDICARE

## 2025-03-18 ENCOUNTER — HOSPITAL ENCOUNTER (OUTPATIENT)
Dept: CARDIOLOGY | Facility: HOSPITAL | Age: 70
Discharge: HOME | End: 2025-03-18
Payer: MEDICARE

## 2025-03-18 VITALS
SYSTOLIC BLOOD PRESSURE: 135 MMHG | OXYGEN SATURATION: 96 % | DIASTOLIC BLOOD PRESSURE: 72 MMHG | WEIGHT: 207.69 LBS | BODY MASS INDEX: 28.17 KG/M2 | HEART RATE: 52 BPM

## 2025-03-18 VITALS
DIASTOLIC BLOOD PRESSURE: 70 MMHG | SYSTOLIC BLOOD PRESSURE: 142 MMHG | WEIGHT: 211 LBS | BODY MASS INDEX: 28.58 KG/M2 | HEIGHT: 72 IN | HEART RATE: 80 BPM

## 2025-03-18 DIAGNOSIS — E78.2 MIXED HYPERLIPIDEMIA: ICD-10-CM

## 2025-03-18 DIAGNOSIS — I35.0 SEVERE AORTIC STENOSIS: ICD-10-CM

## 2025-03-18 DIAGNOSIS — E11.65 TYPE 2 DIABETES MELLITUS WITH HYPERGLYCEMIA, WITH LONG-TERM CURRENT USE OF INSULIN: Primary | ICD-10-CM

## 2025-03-18 DIAGNOSIS — E11.65 TYPE 2 DIABETES MELLITUS WITH HYPERGLYCEMIA, WITH LONG-TERM CURRENT USE OF INSULIN: ICD-10-CM

## 2025-03-18 DIAGNOSIS — Z79.4 TYPE 2 DIABETES MELLITUS WITH HYPERGLYCEMIA, WITH LONG-TERM CURRENT USE OF INSULIN: Primary | ICD-10-CM

## 2025-03-18 DIAGNOSIS — Z79.4 TYPE 2 DIABETES MELLITUS WITH HYPERGLYCEMIA, WITH LONG-TERM CURRENT USE OF INSULIN: ICD-10-CM

## 2025-03-18 LAB
ASCENDING AORTA: 3.1 CM
AV AREA BY CONTINUOUS VTI: 2.5 CM2
AV INDEX (PROSTH): 0.5
AV LVOT MEAN GRADIENT: 4 MMHG
AV LVOT PEAK GRADIENT: 6 MMHG
AV MEAN GRADIENT: 13 MMHG
AV PEAK GRADIENT: 27 MMHG
AV VALVE AREA BY VELOCITY RATIO: 2.3 CM²
AV VALVE AREA: 2.4 CM2
AV VELOCITY RATIO: 0.46
BSA FOR ECHO PROCEDURE: 2.2 M2
CV ECHO LV RWT: 0.36 CM
DOP CALC AO PEAK VEL: 2.6 M/S
DOP CALC AO VTI: 54.3 CM
DOP CALC LVOT AREA: 4.9 CM2
DOP CALC LVOT DIAMETER: 2.5 CM
DOP CALC LVOT PEAK VEL: 1.2 M/S
DOP CALC LVOT STROKE VOLUME: 132 CM3
DOP CALC RVOT AREA: 3.59 CM2
DOP CALC RVOT DIAMETER: 2.14 CM
DOP CALCLVOT PEAK VEL VTI: 26.9 CM
E WAVE DECELERATION TIME: 264 MS
E/A RATIO: 0.6
E/E' RATIO: 3 M/S
ECHO EF ESTIMATED: 63 %
ECHO LV POSTERIOR WALL: 1 CM (ref 0.6–1.1)
FRACTIONAL SHORTENING: 34.5 % (ref 28–44)
HR MV ECHO: 80 BPM
INTERVENTRICULAR SEPTUM: 1 CM (ref 0.6–1.1)
LA MAJOR: 5.1 CM
LA MINOR: 5.2 CM
LA WIDTH: 3.8 CM
LEFT ATRIUM SIZE: 4.1 CM
LEFT ATRIUM VOLUME INDEX MOD: 33 ML/M2
LEFT ATRIUM VOLUME INDEX: 31 ML/M2
LEFT ATRIUM VOLUME MOD: 73 ML
LEFT ATRIUM VOLUME: 68 CM3
LEFT INTERNAL DIMENSION IN SYSTOLE: 3.6 CM (ref 2.1–4)
LEFT VENTRICLE DIASTOLIC VOLUME INDEX: 66.51 ML/M2
LEFT VENTRICLE DIASTOLIC VOLUME: 145 ML
LEFT VENTRICLE MASS INDEX: 97.8 G/M2
LEFT VENTRICLE SYSTOLIC VOLUME INDEX: 24.3 ML/M2
LEFT VENTRICLE SYSTOLIC VOLUME: 53 ML
LEFT VENTRICULAR INTERNAL DIMENSION IN DIASTOLE: 5.5 CM (ref 3.5–6)
LEFT VENTRICULAR MASS: 213.2 G
LV LATERAL E/E' RATIO: 3.4
LV SEPTAL E/E' RATIO: 3.4
MV MEAN GRADIENT: 1 MMHG
MV PEAK A VEL: 0.73 M/S
MV PEAK E VEL: 0.44 M/S
MV PEAK GRADIENT: 3 MMHG
OHS CV RV/LV RATIO: 0.58 CM
PISA TR MAX VEL: 2.6 M/S
RA MAJOR: 4.77 CM
RA PRESSURE ESTIMATED: 3 MMHG
RA WIDTH: 3.46 CM
RIGHT ATRIAL AREA: 16.5 CM2
RIGHT VENTRICLE DIASTOLIC BASEL DIMENSION: 3.2 CM
RV TB RVSP: 6 MMHG
RV TISSUE DOPPLER FREE WALL SYSTOLIC VELOCITY 1 (APICAL 4 CHAMBER VIEW): 12.06 CM/S
SINUS: 2.5 CM
STJ: 2.66 CM
TDI LATERAL: 0.13 M/S
TDI SEPTAL: 0.13 M/S
TDI: 0.13 M/S
TRICUSPID ANNULAR PLANE SYSTOLIC EXCURSION: 2.26 CM
TV PEAK GRADIENT: 27 MMHG
TV REST PULMONARY ARTERY PRESSURE: 30 MMHG
Z-SCORE OF LEFT VENTRICULAR DIMENSION IN END DIASTOLE: -2.81
Z-SCORE OF LEFT VENTRICULAR DIMENSION IN END SYSTOLE: -1.64

## 2025-03-18 PROCEDURE — 3288F FALL RISK ASSESSMENT DOCD: CPT | Mod: CPTII,S$GLB,, | Performed by: INTERNAL MEDICINE

## 2025-03-18 PROCEDURE — 3075F SYST BP GE 130 - 139MM HG: CPT | Mod: CPTII,S$GLB,, | Performed by: INTERNAL MEDICINE

## 2025-03-18 PROCEDURE — 1159F MED LIST DOCD IN RCRD: CPT | Mod: CPTII,S$GLB,, | Performed by: INTERNAL MEDICINE

## 2025-03-18 PROCEDURE — 99999 PR PBB SHADOW E&M-EST. PATIENT-LVL V: CPT | Mod: PBBFAC,,, | Performed by: INTERNAL MEDICINE

## 2025-03-18 PROCEDURE — 3044F HG A1C LEVEL LT 7.0%: CPT | Mod: CPTII,S$GLB,, | Performed by: INTERNAL MEDICINE

## 2025-03-18 PROCEDURE — 93306 TTE W/DOPPLER COMPLETE: CPT

## 2025-03-18 PROCEDURE — 1111F DSCHRG MED/CURRENT MED MERGE: CPT | Mod: CPTII,S$GLB,, | Performed by: INTERNAL MEDICINE

## 2025-03-18 PROCEDURE — 1126F AMNT PAIN NOTED NONE PRSNT: CPT | Mod: CPTII,S$GLB,, | Performed by: INTERNAL MEDICINE

## 2025-03-18 PROCEDURE — 93306 TTE W/DOPPLER COMPLETE: CPT | Mod: 26,,, | Performed by: INTERNAL MEDICINE

## 2025-03-18 PROCEDURE — 1160F RVW MEDS BY RX/DR IN RCRD: CPT | Mod: CPTII,S$GLB,, | Performed by: INTERNAL MEDICINE

## 2025-03-18 PROCEDURE — 99214 OFFICE O/P EST MOD 30 MIN: CPT | Mod: S$GLB,,, | Performed by: INTERNAL MEDICINE

## 2025-03-18 PROCEDURE — 3008F BODY MASS INDEX DOCD: CPT | Mod: CPTII,S$GLB,, | Performed by: INTERNAL MEDICINE

## 2025-03-18 PROCEDURE — 1100F PTFALLS ASSESS-DOCD GE2>/YR: CPT | Mod: CPTII,S$GLB,, | Performed by: INTERNAL MEDICINE

## 2025-03-18 PROCEDURE — 3078F DIAST BP <80 MM HG: CPT | Mod: CPTII,S$GLB,, | Performed by: INTERNAL MEDICINE

## 2025-03-18 RX ORDER — INSULIN LISPRO 100 [IU]/ML
6 INJECTION, SOLUTION INTRAVENOUS; SUBCUTANEOUS
Qty: 15 ML | Refills: 3 | Status: SHIPPED | OUTPATIENT
Start: 2025-03-18

## 2025-03-18 RX ORDER — INSULIN LISPRO 100 [IU]/ML
6 INJECTION, SOLUTION INTRAVENOUS; SUBCUTANEOUS
Qty: 18 ML | Refills: 3 | Status: SHIPPED | OUTPATIENT
Start: 2025-03-18 | End: 2025-03-18 | Stop reason: SDUPTHER

## 2025-03-18 NOTE — ASSESSMENT & PLAN NOTE
Last A1c was 6.6.    Continue current diabetes regimen.  Call if any side effects from the medications.    Check blood sugars before meals and bedtime.  Continue Dexcom CGM.  Call if blood sugars are persistently less than 70 or greater than 200.     Declines referral to the diabetes educator.    Discussed importance of diet and lifestyle modifications for diabetes management  Hypoglycemia management discussed. Carry glucose tablets or snacks at all times.     Discussed risk of complications with uncontrolled diabetes.    Complications:  Follow up for regular diabetes eye exam  Daily self examination of feet.  Microalbumin: Monitor. On ARB    Last A1c:   Lab Results   Component Value Date    HGBA1C 6.6 (H) 01/09/2025

## 2025-03-18 NOTE — PROGRESS NOTES
"  Subjective:      Chief Complaint:  Type 2 diabetes    HPI: Tej Purdy is a 69 y.o. male who is here for a follow-up follow-up of type 2 diabetes. Patient was seen by Dr. Argueta on 2023.    Type 2 diabetes    Diagnosed with T2DM since around .     Complications:   Retinopathy: Denies   Last eye exam:  About 2 years back. Will schedule follow up with his ophthalmologist.   Neuropathy: Yes. On pregabalin.  Denies foot ulcers.    Last foot exam: 2024  Nephropathy: Denies  Cardiovascular:  CAD, CHF, AFib, PAD, hypertension.  DKA/HHS: Denies    Current regimen:  Lantus 22 units qHS (previously was as high as 42 units before a hospitalization)  Humalog 6 units plus scale (+2 if 160-230, +4 if above that, up to 10 units)  Jardiance 10 mg daily    Missed doses: Denies     Prior treatments:  Glipizide 5 mg BID  Metformin 1,000 mg bid    Self-Monitored blood glucose.  # times a day testin/day after dinner - 120-170s depending on how long after dinner he checks his blood sugar.   Log reviewed: No    On dexcom CGM and uses a  - he had a pacemaker placement last month and has not used his Dexcom since then due to difficulty placing it on his arm.      Current Symptoms  No   Yes  [x]    []  Polydipsia  []    [x]  Polyuria, chronic  [x]    []  Vision changes  [x]    []  Nausea  [x]    []  Diarrhea  [x]    []  Weight gain  [x]    []  Weight loss      Severe Hypoglycemia: Denies  Hypoglycemia unawareness: Denies  Hypoglycemic episodes:  Lowest blood sugar recently was in the 70s.    Diet/Exercise:   2-3 meals a day, patient says he is a"night owl" - sometimes skips breakfast.  Takes frequent carb snacks  Water intake - with meds - 1-2 bottles x 16 oz.   Takes milk, coke zero regularly.    Diabetes education:  Does not recall    Last A1c:   Lab Results   Component Value Date    HGBA1C 6.6 (H) 2025    HGBA1C 5.1 2024    HGBA1C 7.5 (H) 2024     microalbumin:   Lab Results "   Component Value Date    LABMICR 40.0 06/29/2021    CREATRANDUR 99.0 12/23/2023    MICALBCREAT 17.4 06/29/2021     Lab Results   Component Value Date    EGFRNORACEVR >60.0 02/18/2025    CREATININE 1.1 02/18/2025     Lipids:   Lab Results   Component Value Date    CHOL 89 (L) 09/18/2024    TRIG 134 09/18/2024    HDL 26 (L) 09/18/2024    LDLCALC 36.2 (L) 09/18/2024    CHOLHDL 29.2 09/18/2024       TSH:  Lab Results   Component Value Date    TSH 0.435 01/08/2025       Lab Results   Component Value Date    SFDNELUB62 412 12/23/2023     Lab Results   Component Value Date    HGB 12.2 (L) 02/18/2025      Aspirin: No on Eliquis.  Statins: Yes, on atorvastatin  ACEI/ARB:  On Entresto      Of note, pt PMH includes HCC. S/p TACE and RFA.   S/p liver transplant 1/2022. Found to have cholangiocarcinoma after liver removed.  Also found to have afib (during evaluation for RHC as part of liver transplant evaluation). S/p 3x DCCV.    Has HTN. BP normal today.  Pt notes white coat HTN    Denies history of pancreatitis or personal/FH medullary thyroid cancer/MEN syndrome.  History recurrent UTI/yeast infections:  Denies      ROS: see HPI     Objective:     Physical Exam     /72 (BP Location: Right arm, Patient Position: Sitting)   Pulse (!) 52   Wt 94.2 kg (207 lb 10.8 oz)   SpO2 96%   BMI 28.17 kg/m²     Wt Readings from Last 3 Encounters:   03/17/25 94.7 kg (208 lb 12.4 oz)   02/24/25 96 kg (211 lb 10.3 oz)   02/18/25 95 kg (209 lb 7 oz)       Constitutional:  Pleasant,  in no acute distress.   HENT:   Eyes:    No scleral icterus.   Cardiovascular:  Normal rate  Respiratory:   Effort normal   Neurological:  No tremor  Skin:    Skin is warm, dry  Extremity:  No edema    DIABETIC FOOT EXAM: 3/18/2025 - normal sensation to monofilament testing bilaterally.  No fissures, wounds, or ulcers.    LABORATORY REVIEW:  See HPI for other labs reviewed today      Chemistry        Component Value Date/Time     02/18/2025 0945     K 4.0 02/18/2025 0945     02/18/2025 0945    CO2 23 02/18/2025 0945    BUN 27 (H) 02/18/2025 0945    CREATININE 1.1 02/18/2025 0945     (H) 02/18/2025 0945        Component Value Date/Time    CALCIUM 9.4 02/18/2025 0945    ALKPHOS 80 02/12/2025 0822    AST 17 02/12/2025 0822    ALT 10 02/12/2025 0822    BILITOT 0.9 02/12/2025 0822    ESTGFRAFRICA >60.0 07/18/2022 1025    EGFRNONAA >60.0 07/18/2022 1025          Lab Results   Component Value Date    HGBA1C 6.6 (H) 01/09/2025    HGBA1C 5.1 09/18/2024    HGBA1C 7.5 (H) 01/26/2024     Other labs reviewed today in HPI    Assessment/Plan:     1. Type 2 diabetes mellitus with hyperglycemia, with long-term current use of insulin  Assessment & Plan:      Last A1c was 6.6.    Continue current diabetes regimen.  Call if any side effects from the medications.    Check blood sugars before meals and bedtime.  Continue Dexcom CGM.  Call if blood sugars are persistently less than 70 or greater than 200.     Declines referral to the diabetes educator.    Discussed importance of diet and lifestyle modifications for diabetes management  Hypoglycemia management discussed. Carry glucose tablets or snacks at all times.     Discussed risk of complications with uncontrolled diabetes.    Complications:  Follow up for regular diabetes eye exam  Daily self examination of feet.  Microalbumin: Monitor. On ARB    Last A1c:   Lab Results   Component Value Date    HGBA1C 6.6 (H) 01/09/2025          Orders:  -     Ambulatory referral/consult to Endocrinology  -     Discontinue: insulin lispro (HUMALOG KWIKPEN INSULIN) 100 unit/mL pen; Inject 6 Units into the skin 3 (three) times daily with meals. Plus sliding scale, MDD: 48 units  Dispense: 18 mL; Refill: 3  -     insulin lispro (HUMALOG KWIKPEN INSULIN) 100 unit/mL pen; Inject 6 Units into the skin 3 (three) times daily with meals. Plus sliding scale, MDD: 48 units  Dispense: 15 mL; Refill: 3  -     Hemoglobin A1C; Future; Expected  date: 09/18/2025  -     Renal Function Panel; Future; Expected date: 09/18/2025    2. Mixed hyperlipidemia  Assessment & Plan:    Continue statin.           Follow up in about 6 months (around 9/18/2025).     Tari Telles MD

## 2025-03-18 NOTE — PATIENT INSTRUCTIONS
Continue current diabetes medications.    Call if you have any side effects from the medication:   Jardiance:  Dehydration, increased urination, urinary tract infections, yeast infections, diabetic ketoacidosis.  Call if you have any foot infections.       Check blood sugars before meals and bedtime.   Call if blood sugars are frequently less than 70 or above 200.    Call if you need prescriptions for medications.  Carry glucose tablets or snacks with you at all times.     Follow-up and labs in 6 months.

## 2025-03-19 ENCOUNTER — RESULTS FOLLOW-UP (OUTPATIENT)
Dept: PRIMARY CARE CLINIC | Facility: CLINIC | Age: 70
End: 2025-03-19

## 2025-03-20 NOTE — PROGRESS NOTES
/62 (BP Location: Left arm, Patient Position: Sitting)   Pulse (!) 51   Ht 6' (1.829 m)   Wt 94.7 kg (208 lb 12.4 oz)   SpO2 95%   BMI 28.32 kg/m²       ===========              Tej Purdy is a 69 y.o. male           History of Present Illness    CHIEF COMPLAINT:  Mr. Purdy presents for a follow-up visit to discuss recent medical events, including a recent pacemaker implantation and a hospitalization for a seizure-like event.    HPI:  Mr. Purdy had a pacemaker implanted within the last couple of weeks and was hospitalized for approximately a week due to a seizure-like event. During this event, he regained consciousness on the bathroom floor with significant pain on one side of his body. He initially attributed this to falling and hitting the side of the toilet, but the pain later resembled the shifting pain associated with kidney stones, which he has a history of.    This type of episode has occurred 5-6 times in the last couple of years. In previous instances, he has had hematuria but never passed a visible stone. The recent event resolved spontaneously without specific treatment. During the hospital stay, numerous tests were performed, but no definitive cause was determined for the seizure-like event.    Mr. Purdy reports ongoing mobility issues for the past 5-10 years. He cannot walk more than 15-20 yards before severe pain in both hips and leg muscles necessitates rest. He has neuropathy, for which he is taking Lyrica. He has pins and needles sensations in his feet, which are not painful. The only pain he has is when attempting to walk more than about 20 yards.    He reports feeling better overall and having more energy recently. He plans to start cardiac rehabilitation, which he had postponed in the past due to lack of energy. He expresses concern about his ability to participate fully in cardiac rehab due to his hip and leg pain limitations rather than shortness of breath.      Gurwinder mentions having a constantly runny nose for the past 2-3 years, which he has never had before in his life. He wonders if this could be a side effect of any of his medications.    He denies hematuria during the recent seizure-like event.               Patient queried and denies any further complaints      Problem List[1]    SURGICAL AND MEDICAL HISTORY: updated and reviewed.  Past Surgical History:   Procedure Laterality Date    A-V CARDIAC PACEMAKER INSERTION N/A 2/19/2025    Procedure: INSERTION, CARDIAC PACEMAKER, DUAL CHAMBER;  Surgeon: Daniel Arambula MD;  Location: [facility] EP LAB;  Service: Cardiology;  Laterality: N/A;    ANGIOGRAM, CORONARY, WITH LEFT HEART CATHETERIZATION N/A 12/26/2023    Procedure: Angiogram, Coronary, with Left Heart Cath;  Surgeon: Carlos King MD;  Location: [facility] CATH LAB;  Service: Cardiology;  Laterality: N/A;    AORTIC VALVULOPLASTY N/A 2/1/2024    Procedure: REPAIR, AORTIC VALVE;  Surgeon: Shilo Carrasco MD;  Location: [facility] CATH LAB;  Service: Cardiology;  Laterality: N/A;    CARDIAC CATH COSURGEON N/A 2/18/2025    Procedure: Cardiac Cath Cosurgeon;  Surgeon: Alberto Vicente MD;  Location: [facility] CATH LAB;  Service: Cardiothoracic;  Laterality: N/A;    COLONOSCOPY  10/2020    DIAGNOSTIC CARDIAC ELECTROPHYSIOLOGY STUDY N/A 2/19/2025    Procedure: EP - diagnostic;  Surgeon: Daniel Arambula MD;  Location: [facility] EP LAB;  Service: Cardiology;  Laterality: N/A;  LBBB, EPS +/- DUAL PPM, MDT, GP, ANES,     ECHOCARDIOGRAM,TRANSESOPHAGEAL N/A 1/26/2024    Procedure: Transesophageal echo (ASHISH) intra-procedure log documentation;  Surgeon: Nick Mathur III, MD;  Location: [facility] EP LAB;  Service: Cardiology;  Laterality: N/A;    ESOPHAGEAL MANOMETRY WITH MEASUREMENT OF IMPEDANCE N/A 7/17/2023    Procedure: MANOMETRY, ESOPHAGUS, WITH IMPEDANCE MEASUREMENT;  Surgeon: Klarissa Zamora MD;  Location: [facility] ENDO ([address] [place] [address]);  Service: Gastroenterology;  Laterality: N/A;  pt  diabetic  liver txp  prep insructions sent to pt via email and portal -Jm    ESOPHAGOGASTRODUODENOSCOPY  10/2020    ESOPHAGOGASTRODUODENOSCOPY N/A 7/1/2021    Procedure: EGD (ESOPHAGOGASTRODUODENOSCOPY);  Surgeon: Jovan David MD;  Location: [facility] (4TH FLR);  Service: Endoscopy;  Laterality: N/A;  HCC. Listed for liver transplant. EGD for variceal surveillance.  cardiac clearance and blood thinenr approval received, see telephone encounter 6/23/21-BB  fully vaccinated-BB  labs same day of procedure-BB    EXCISION OF HYDROCELE Right     hemorroid surgery      hemorroidectomy      KIDNEY STONE SURGERY      LAPAROSCOPIC APPENDECTOMY N/A 6/2/2023    Procedure: APPENDECTOMY, LAPAROSCOPIC;  Surgeon: Shan Ross MD;  Location: [facility] OR 2ND FLR;  Service: General;  Laterality: N/A;    LEFT HEART CATHETERIZATION N/A 1/27/2021    Procedure: Left heart cath;  Surgeon: Kris Shelton MD;  Location: [facility] CATH LAB;  Service: Cardiology;  Laterality: N/A;    liver mass removal      LIVER TRANSPLANT N/A 1/6/2022    Procedure: TRANSPLANT, LIVER;  Surgeon: Lizet Garcia MD;  Location: [facility] OR 2ND FLR;  Service: Transplant;  Laterality: N/A;    MAGNETIC RESONANCE IMAGING N/A 1/13/2025    Procedure: MRI (Magnetic Resonance Imagine);  Surgeon: Gaby Cuevas;  Location: [facility] GABY;  Service: Anesthesiology;  Laterality: N/A;  minimal sedation prior to MRI    RIGHT HEART CATHETERIZATION Right 5/7/2021    Procedure: INSERTION, CATHETER, RIGHT HEART;  Surgeon: Jaden Schuster MD;  Location: [facility] CATH LAB;  Service: Cardiology;  Laterality: Right;    STENT, DRUG ELUTING, SINGLE VESSEL, CORONARY  12/26/2023    Procedure: Stent, Drug Eluting, Single Vessel, Coronary;  Surgeon: Carlos King MD;  Location: [facility] CATH LAB;  Service: Cardiology;;    TRANSCATHETER AORTIC VALVE REPLACEMENT (TAVR) N/A 2/18/2025    Procedure: REPLACEMENT, AORTIC VALVE, TRANSCATHETER (TAVR);  Surgeon: Shilo Carrasco MD;  Location: [facility]  CATH LAB;  Service: Cardiology;  Laterality: N/A;    TRANSCATHETER AORTIC VALVE REPLACEMENT (TAVR)  2/18/2025    Procedure: REPLACEMENT, AORTIC VALVE, TRANSCATHETER (TAVR);  Surgeon: Alberto Vicente MD;  Location: [facility] CATH LAB;  Service: Cardiothoracic;;    TREATMENT OF CARDIAC ARRHYTHMIA N/A 5/19/2021    Procedure: CARDIOVERSION;  Surgeon: Didier Tilley MD;  Location: [facility] EP LAB;  Service: Cardiology;  Laterality: N/A;  a fib, dccv/johny, mac, dm. sscu    TREATMENT OF CARDIAC ARRHYTHMIA N/A 5/31/2021    Procedure: CARDIOVERSION;  Surgeon: Daniel Arambula MD;  Location: [facility] EP LAB;  Service: Cardiology;  Laterality: N/A;  afib, DCCV, anest., DM, 3 prep    TREATMENT OF CARDIAC ARRHYTHMIA N/A 7/7/2021    Procedure: Cardioversion or Defibrillation;  Surgeon: Didier Tilley MD;  Location: [facility] EP LAB;  Service: Cardiology;  Laterality: N/A;  AF, JOHNY (cancel if complaint), DCCV, MAC, DM, 3 Prep    TREATMENT OF CARDIAC ARRHYTHMIA N/A 7/27/2021    Procedure: Cardioversion or Defibrillation;  Surgeon: Didier Tilley MD;  Location: [facility] EP LAB;  Service: Cardiology;  Laterality: N/A;  AF, JOHNY (cx if complaint), DCCV, MAC, DM, 3 Prep    TREATMENT OF CARDIAC ARRHYTHMIA N/A 1/26/2024    Procedure: Cardioversion or Defibrillation;  Surgeon: Koko Knight MD;  Location: [facility] EP LAB;  Service: Cardiology;  Laterality: N/A;  AF, JOHNY, DCCV, MAC, DM, 3 Prep *ADENOSINE TEST* *LIVER TRANSPLANT PATIENT*     ALLERGIES updated and reviewed.  Review of patient's allergies indicates:   Allergen Reactions    Bee pollens Swelling     BEE STINGS swells body       CURRENT OUTPATIENT MEDICATIONS updated and reviewed  Current Medications[2]    Review of Systems   Constitutional:  Negative for activity change, appetite change, chills, diaphoresis, fatigue, fever and unexpected weight change.   HENT:  Negative for congestion, ear discharge, ear pain, facial swelling, hearing loss, nosebleeds, postnasal drip, rhinorrhea, sinus pressure,  sneezing, sore throat, tinnitus, trouble swallowing and voice change.    Eyes:  Negative for photophobia, pain, discharge, redness, itching and visual disturbance.   Respiratory:  Negative for cough, chest tightness, shortness of breath and wheezing.    Cardiovascular:  Negative for chest pain, palpitations and leg swelling.   Gastrointestinal:  Negative for abdominal distention, abdominal pain, anal bleeding, blood in stool, constipation, diarrhea, nausea, rectal pain and vomiting.   Endocrine: Negative for cold intolerance, heat intolerance, polydipsia, polyphagia and polyuria.   Genitourinary:  Negative for difficulty urinating, dysuria and flank pain.   Musculoskeletal:  Negative for arthralgias, back pain, joint swelling, myalgias and neck pain.   Skin:  Negative for rash.   Neurological:  Negative for dizziness, tremors, seizures, syncope, speech difficulty, weakness, light-headedness, numbness and headaches.   Psychiatric/Behavioral:  Negative for behavioral problems, confusion, decreased concentration, dysphoric mood, sleep disturbance and suicidal ideas. The patient is not nervous/anxious and is not hyperactive.        /62 (BP Location: Left arm, Patient Position: Sitting)   Pulse (!) 51   Ht 6' (1.829 m)   Wt 94.7 kg (208 lb 12.4 oz)   SpO2 95%   BMI 28.32 kg/m²   Physical Exam  Vitals and nursing note reviewed.   Constitutional:       General: He is not in acute distress.     Appearance: Normal appearance. He is well-developed. He is not ill-appearing or toxic-appearing.   HENT:      Head: Normocephalic and atraumatic.      Right Ear: Tympanic membrane, ear canal and external ear normal.      Left Ear: Tympanic membrane, ear canal and external ear normal.      Nose: Nose normal.      Mouth/Throat:      Lips: Pink.      Mouth: Mucous membranes are moist.      Pharynx: No oropharyngeal exudate or posterior oropharyngeal erythema.   Eyes:      General: No scleral icterus.        Right eye: No  discharge.         Left eye: No discharge.      Extraocular Movements: Extraocular movements intact.      Conjunctiva/sclera: Conjunctivae normal.   Cardiovascular:      Rate and Rhythm: Normal rate and regular rhythm.      Pulses: Normal pulses.      Heart sounds: Normal heart sounds. No murmur heard.  Pulmonary:      Effort: Pulmonary effort is normal. No respiratory distress.      Breath sounds: Normal breath sounds. No wheezing or rales.   Abdominal:      General: Bowel sounds are normal. There is no distension.      Palpations: Abdomen is soft. There is no mass.      Tenderness: There is no abdominal tenderness. There is no right CVA tenderness, left CVA tenderness, guarding or rebound.      Hernia: No hernia is present.   Musculoskeletal:      Cervical back: Normal range of motion and neck supple. No rigidity or tenderness.   Lymphadenopathy:      Cervical: No cervical adenopathy.   Skin:     General: Skin is warm and dry.   Neurological:      General: No focal deficit present.      Mental Status: He is alert. Mental status is at baseline.   Psychiatric:         Mood and Affect: Mood normal.         Behavior: Behavior normal. Behavior is cooperative.           ASSESSMENT/PLAN    Assessment & Plan    IMPRESSION:  - Reviewed recent hospitalization for seizure-like event, noting no definitive cause found.  - Considered possibility of kidney stones based on history and recent imaging showing small bilateral stones/calcifications, but no current intervention needed.  - Assessed recent labs, noting slightly low blood count but improved from previous results.  - Evaluated A1C level, with most recent at 5.1 in September and 7.5 in January, determining endocrinology follow-up not necessary at this time.  - Considered complaint of chronic runny nose, but did not identify clear medication-related cause.    ULCER OF RIGHT FOOT, UNSPECIFIED ULCER STAGE:  - Noted patient's report of neuropathy in feet with pins and needles  sensation, but no pain.  - Continued Lyrica for neuropathy management.  - Evaluated patient's mobility, noting pain in hips and legs when walking more than 15-20 yards.  - Acknowledged mobility issues and scheduled patient to start cardio rehabilitation to improve mobility and exercise tolerance.    CARDIAC CONDITIONS AND PACEMAKER:  - Reviewed and refilled cardiac medications, including Carvedilol, Furosemide (Lasix), and Eliquis, to be filled at local pharmacy.  - Noted recent pacemaker implantation and hospitalization two weeks ago.  - Scheduled follow-up with electrophysiology.  - Deferred management of anticoagulation to the cardiology team.    SEIZURE-LIKE EVENT:  - Noted the patient's recent seizure-like event resulting in a week-long hospitalization.  - Evaluated test results, finding no correlation with the seizure event.  - Ordered an MRI of the brain for further assessment.    KIDNEY STONES:  - Reviewed patient's history of kidney stones and similar pain experiences.  - CT results showed non-obstructing stones in the right kidney and small bilateral stones or calcifications.  - Discussed the possibility of passing a kidney stone with the patient.    TYPE 2 DIABETES:  - Monitored diabetes management, including insulin use and A1C levels.  - Noted A1C levels: 5.1 in September, 7.5 in January.  - Assessed that endocrinology follow-up may not be necessary due to good A1C control.  - Continued insulin therapy for diabetes management.    LIVER TRANSPLANT:  - Noted the patient's liver transplant history and ongoing monitoring by a hepatologist.    HIP AND LEG PAIN:  - Noted the patient's hip and leg pain when walking more than 15-20 yards.    CHRONIC RHINITIS:  - Noted the patient's report of constantly runny nose for the past 2-3 years.  - Considered the possibility of rhinorrhea being a medication side effect.           Tej was seen today for follow-up.    Diagnoses and all orders for this visit:    Type 2  diabetes mellitus with hyperglycemia, with long-term current use of insulin  -     insulin glargine U-100, Lantus, (LANTUS SOLOSTAR U-100 INSULIN) 100 unit/mL (3 mL) InPn pen; Inject 22 Units into the skin every evening.  -     Microalbumin/Creatinine Ratio, Urine; Future    Primary hypertension  -     carvediloL (COREG) 12.5 MG tablet; Take 1 tablet (12.5 mg total) by mouth 2 (two) times daily with meals.  -     furosemide (LASIX) 20 MG tablet; Take 1 tablet (20 mg total) by mouth once daily.  -     empagliflozin (JARDIANCE) 10 mg tablet; Take 1 tablet (10 mg total) by mouth once daily.    Mixed hyperlipidemia  -     atorvastatin (LIPITOR) 80 MG tablet; Take 1 tablet (80 mg total) by mouth once daily.    Other orders  -     pantoprazole (PROTONIX) 40 MG tablet; Take 1 tablet (40 mg total) by mouth once daily.            Most recent some lab results reviewed with patient.  Any new prescription medications gone over in detail including reason for taking the medication, most common possible side effects and possible costs, etcetera.    Chronic conditions updated. Other than changes or additions as above, cont current medications and maintain follow-up with specialists if indicated.     - Cautioned the patient against excessive use of internet searches for interpreting results before professional review.     Kole Sharpe MD    Disclaimer:  This clinical note was created with the assistance of Qapa, an [employer]-powered documentation tool.  Portions of this note may also have been dictated with the assistance of a computer-assisted dictation program.  While the healthcare provider has reviewed the content, AI-assisted documentation and/or dictation programs may contain inadvertent errors or omissions.  Patients are encouraged to discuss questions or clarifications regarding their care directly with the provider.                         [1]   Patient Active Problem List  Diagnosis    Mixed hyperlipidemia    Primary  hypertension    Skin cancer    Kidney stone    Diabetic neuropathy    PAD (peripheral artery disease)    Leukocytosis    Abdominal pain    Type 2 diabetes mellitus, with long-term current use of insulin    Coronary artery disease involving native coronary artery of native heart without angina pectoris    Atrial fibrillation with rapid ventricular response    History of cardioversion    Chronic anticoagulation    S/P liver transplant    At risk for opportunistic infections    Prophylactic immunotherapy    Anemia of chronic disease    Long-term use of immunosuppressant medication    Weakness    Type 2 diabetes mellitus with hyperglycemia    Anemia due to unknown mechanism    Fatigue    Other ascites    Cholangiocarcinoma    Severe aortic stenosis    Unstable angina    Presence of drug-eluting stent in left circumflex coronary artery    Orthostatic dizziness    Nephrolithiasis    Chronic systolic heart failure    History of liver transplant    Iron deficiency anemia    Immunodeficiency due to drugs    Aortic stenosis    History of cholangiocarcinoma    Thrombocytopenia    Seizure    Hypercoagulable state due to atrial fibrillation    History of skin cancer    Immunocompromised state    Hypertensive urgency    LBBB (left bundle branch block)   [2]   Current Outpatient Medications:     apixaban (ELIQUIS) 5 mg Tab, Take 1 tablet (5 mg total) by mouth 2 (two) times daily., Disp: 180 tablet, Rfl: 3    atorvastatin (LIPITOR) 80 MG tablet, Take 1 tablet (80 mg total) by mouth once daily., Disp: 90 tablet, Rfl: 3    blood sugar diagnostic Strp, To check BG 2 times daily, to use with insurance preferred meter (patient has a [employer] self-monitoring glucose meter), Disp: 100 strip, Rfl: 11    blood-glucose meter kit, To check BG 2 times daily, to use with insurance preferred meter, Disp: 1 each, Rfl: 0    cycloSPORINE modified, NEORAL, (NEORAL) 25 MG capsule, Take 2 capsules (50 mg total) by mouth every morning AND 1 capsule  "(25 mg total) every evening., Disp: 90 capsule, Rfl: 11    diazePAM (VALIUM) 5 MG tablet, Take 1 tablet (5 mg total) by mouth as needed for Anxiety. Take 1 tab 30 min prior to MRI, take 2nd as needed, Disp: 2 tablet, Rfl: 0    ferrous sulfate (IRON) 325 mg (65 mg iron) Tab tablet, Take 1 tablet (325 mg total) by mouth once daily., Disp: 30 tablet, Rfl: 2    furosemide (LASIX) 20 MG tablet, Take 1 tablet (20 mg total) by mouth once daily., Disp: 90 tablet, Rfl: 3    HYDROcodone-acetaminophen (NORCO) 5-325 mg per tablet, Take 1 tablet by mouth every 8 (eight) hours as needed for Pain., Disp: 12 tablet, Rfl: 0    lancets Misc, To check BG 2 times daily, to use with insurance preferred meter, Disp: 100 each, Rfl: 11    levETIRAcetam (KEPPRA) 500 MG Tab, Take 1 tablet (500 mg total) by mouth 2 (two) times daily., Disp: 60 tablet, Rfl: 11    magnesium oxide (MAG-OX) 400 mg (241.3 mg magnesium) tablet, Take 1 tablet (400 mg total) by mouth 2 (two) times daily., Disp: 60 tablet, Rfl: 11    multivitamin capsule, Take 1 capsule by mouth once daily., Disp: , Rfl:     mycophenolate (CELLCEPT) 250 mg Cap, Take 2 capsules (500 mg total) by mouth 2 (two) times daily., Disp: 120 capsule, Rfl: 11    omega-3 fatty acids/fish oil (FISH OIL-OMEGA-3 FATTY ACIDS) 300-1,000 mg capsule, Take 1 capsule by mouth once daily. , Disp: , Rfl:     pen needle, diabetic (BD ULTRA-FINE GÉNESIS PEN NEEDLE) 32 gauge x 5/32" Ndle, Use to inject insulin 4 times daily, Disp: 200 each, Rfl: 11    pregabalin (LYRICA) 75 MG capsule, Take 1 capsule (75 mg total) by mouth 2 (two) times daily., Disp: 60 capsule, Rfl: 0    QUEtiapine (SEROQUEL) 100 MG Tab, Take 1 tablet (100 mg total) by mouth every evening., Disp: 90 tablet, Rfl: 1    sacubitriL-valsartan (ENTRESTO) 49-51 mg per tablet, Take 1 tablet by mouth 2 (two) times daily., Disp: 180 tablet, Rfl: 3    atorvastatin (LIPITOR) 80 MG tablet, Take 1 tablet (80 mg total) by mouth once daily., Disp: 90 tablet, " Rfl: 3    carvediloL (COREG) 12.5 MG tablet, Take 1 tablet (12.5 mg total) by mouth 2 (two) times daily with meals., Disp: 180 tablet, Rfl: 3    empagliflozin (JARDIANCE) 10 mg tablet, Take 1 tablet (10 mg total) by mouth once daily., Disp: 90 tablet, Rfl: 3    furosemide (LASIX) 20 MG tablet, Take 1 tablet (20 mg total) by mouth once daily., Disp: 90 tablet, Rfl: 3    insulin glargine U-100, Lantus, (LANTUS SOLOSTAR U-100 INSULIN) 100 unit/mL (3 mL) InPn pen, Inject 22 Units into the skin every evening., Disp: 15 mL, Rfl: 11    insulin lispro (HUMALOG KWIKPEN INSULIN) 100 unit/mL pen, Inject 6 Units into the skin 3 (three) times daily with meals. Plus sliding scale, MDD: 48 units, Disp: 15 mL, Rfl: 3    pantoprazole (PROTONIX) 40 MG tablet, Take 1 tablet (40 mg total) by mouth once daily., Disp: 90 tablet, Rfl: 1    Current Facility-Administered Medications:     sodium chloride 0.9% flush 10 mL, 10 mL, Intravenous, PRN, Shilo Carrasco MD

## 2025-03-24 ENCOUNTER — PATIENT MESSAGE (OUTPATIENT)
Dept: ELECTROPHYSIOLOGY | Facility: CLINIC | Age: 70
End: 2025-03-24
Payer: MEDICARE

## 2025-04-01 DIAGNOSIS — F32.A DEPRESSION, UNSPECIFIED DEPRESSION TYPE: ICD-10-CM

## 2025-04-01 RX ORDER — BUPROPION HYDROCHLORIDE 150 MG/1
150 TABLET ORAL EVERY MORNING
Qty: 90 TABLET | Refills: 1 | OUTPATIENT
Start: 2025-04-01

## 2025-04-01 NOTE — TELEPHONE ENCOUNTER
Refill Decision Note   Tej Purdy  is requesting a refill authorization.  Brief Assessment and Rationale for Refill:  Quick Discontinue     Medication Therapy Plan:  discontinued on 1/8/2025 by Shan Briscoe DO for the following reason: Side effects.      Comments:     Note composed:1:16 PM 04/01/2025

## 2025-04-01 NOTE — TELEPHONE ENCOUNTER
No care due was identified.  Health [facility] Embedded Care Due Messages. Reference number: [id number].   4/01/2025 11:16:43 AM CDT

## 2025-04-02 ENCOUNTER — HOSPITAL ENCOUNTER (OUTPATIENT)
Dept: CARDIOLOGY | Facility: HOSPITAL | Age: 70
Discharge: HOME | End: 2025-04-02
Attending: STUDENT IN AN ORGANIZED HEALTH CARE EDUCATION/TRAINING PROGRAM
Payer: MEDICARE

## 2025-04-02 ENCOUNTER — TELEPHONE (OUTPATIENT)
Dept: PRIMARY CARE CLINIC | Facility: CLINIC | Age: 70
End: 2025-04-02
Payer: MEDICARE

## 2025-04-02 VITALS
HEART RATE: 66 BPM | SYSTOLIC BLOOD PRESSURE: 166 MMHG | BODY MASS INDEX: 28.04 KG/M2 | WEIGHT: 207 LBS | HEIGHT: 72 IN | DIASTOLIC BLOOD PRESSURE: 76 MMHG

## 2025-04-02 DIAGNOSIS — Z95.2 HEART VALVE REPLACED BY TRANSPLANT: ICD-10-CM

## 2025-04-02 LAB
CV STRESS BASE HR: 66 BPM
DIASTOLIC BLOOD PRESSURE: 76 MMHG
OHS CV CPX 1 MINUTE RECOVERY HEART RATE: 95 BPM
OHS CV CPX 85 PERCENT MAX PREDICTED HEART RATE MALE: 127
OHS CV CPX ABDOMINAL GIRTH: 46 CM
OHS CV CPX DATA GRADE - PEAK: 1.7
OHS CV CPX DATA O2 SAT - PEAK: 94
OHS CV CPX DATA O2 SAT - REST: 95
OHS CV CPX DATA SPEED - PEAK: 2
OHS CV CPX DATA TIME - PEAK: 2.75
OHS CV CPX DATA VE/VCO2 - PEAK: 43
OHS CV CPX DATA VE/VO2 - PEAK: 35
OHS CV CPX DATA VO2 - PEAK: 11.8
OHS CV CPX DATA VO2 - REST: 4.2
OHS CV CPX ESTIMATED METS: 3
OHS CV CPX FEV1/FVC: 0.73
OHS CV CPX FORCED EXPIRATORY VOLUME: 2.67
OHS CV CPX FORCED VITAL CAPACITY (FVC): 3.65
OHS CV CPX HIGHEST VO: 29.4
OHS CV CPX MAX PREDICTED HEART RATE: 149
OHS CV CPX MAXIMAL VOLUNTARY VENTILATION (MVV) PREDICTED: 106.8
OHS CV CPX MAXIMAL VOLUNTARY VENTILATION (MVV): 81
OHS CV CPX MAXIUMUM EXERCISE VENTILATION (VE MAX): 35.9
OHS CV CPX PATIENT AGE: 71
OHS CV CPX PATIENT HEIGHT IN: 72
OHS CV CPX PATIENT IS FEMALE AGE 11-19: 0
OHS CV CPX PATIENT IS FEMALE AGE GREATER THAN 19: 0
OHS CV CPX PATIENT IS FEMALE AGE LESS THAN 11: 0
OHS CV CPX PATIENT IS FEMALE: 0
OHS CV CPX PATIENT IS MALE AGE 11-25: 0
OHS CV CPX PATIENT IS MALE AGE GREATER THAN 25: 1
OHS CV CPX PATIENT IS MALE AGE LESS THAN 11: 0
OHS CV CPX PATIENT IS MALE GREATER THAN 18: 1
OHS CV CPX PATIENT IS MALE LESS THAN OR EQUAL TO 18: 0
OHS CV CPX PATIENT IS MALE: 1
OHS CV CPX PATIENT WEIGHT RETURNED IN OZ: 3312
OHS CV CPX PEAK DIASTOLIC BLOOD PRESSURE: 69 MMHG
OHS CV CPX PEAK HEAR RATE: 96 BPM
OHS CV CPX PEAK RATE PRESSURE PRODUCT: NORMAL
OHS CV CPX PEAK SYSTOLIC BLOOD PRESSURE: 163 MMHG
OHS CV CPX PERCENT BODY FAT: 23.5
OHS CV CPX PERCENT MAX PREDICTED HEART RATE ACHIEVED: 64
OHS CV CPX PREDICTED VO2: 29.4 ML/KG/MIN
OHS CV CPX RATE PRESSURE PRODUCT PRESENTING: NORMAL
OHS CV CPX REST PET CO2: 25
OHS CV CPX VE/VCO2 SLOPE: 42.7
STRESS ECHO POST EXERCISE DUR MIN: 2 MINUTES
STRESS ECHO POST EXERCISE DUR SEC: 45 SECONDS
SYSTOLIC BLOOD PRESSURE: 166 MMHG

## 2025-04-02 PROCEDURE — 94621 CARDIOPULM EXERCISE TESTING: CPT

## 2025-04-02 PROCEDURE — 94621 CARDIOPULM EXERCISE TESTING: CPT | Mod: 26,,, | Performed by: INTERNAL MEDICINE

## 2025-04-02 NOTE — TELEPHONE ENCOUNTER
"LOV 3/17/25  Annual 9/16/24  RTC 9/17/25    Spoke to the patient's wife. She called to discuss why the patient's wellbutrin was discontinued. Medication was discontinued because neurology recommended discontinuing since it's increasing his risk of seizures. Also the patient stated he was not taking it on 1/8/25. The patient's wife stated the medication was discontinued by accident. The neurologist was trying to determine the cause of the seizures by eliminating one medication at a time. He does not experience any side effects. She states the neurologist is "just guessing" at what is causing his seizures, "it is not the wellbutrin", and he has not had a seizure since his initial one and the patient has been taking the wellbutrin since January 2025. The patient would like a refill for this medication. It is usually coupled with quetiapine    "

## 2025-04-02 NOTE — TELEPHONE ENCOUNTER
I called and sw pt, he vu of Dr. Sharpe' message and requested assistance setting up appt with previous Psych provider, PARESH Benjamin to discuss safer options other than Wellbutrin. LOV with PARESH Benjamin was 11/06/24.

## 2025-04-02 NOTE — TELEPHONE ENCOUNTER
----- Message from Dalia sent at 4/2/2025 12:12 PM CDT -----  Contact: [phone] Patient (wife) Aniya  .1MEDICALADVICE Patient is calling for Medical Advice regarding: Pt wife states Rx denied by Pharmacy - clarification needed.Wellbutrin - Pt completely out of Rx - How long has patient had these symptoms:[facility] and phone[id number] [facility] [address]: [phone] Fax: [phone] wants a call back or thru [facility]: call back to wife, AniyaComments:Please advise patient replies from provider may take up to 48 hours.

## 2025-04-07 ENCOUNTER — TELEPHONE (OUTPATIENT)
Dept: CARDIAC REHAB | Facility: CLINIC | Age: 70
End: 2025-04-07
Payer: MEDICARE

## 2025-04-19 DIAGNOSIS — F32.A DEPRESSION, UNSPECIFIED DEPRESSION TYPE: ICD-10-CM

## 2025-04-19 NOTE — TELEPHONE ENCOUNTER
No care due was identified.  Health [facility] Embedded Care Due Messages. Reference number: [id number].   4/19/2025 4:03:55 PM CDT

## 2025-04-21 RX ORDER — QUETIAPINE FUMARATE 100 MG/1
100 TABLET, FILM COATED ORAL NIGHTLY
Qty: 90 TABLET | Refills: 0 | Status: SHIPPED | OUTPATIENT
Start: 2025-04-21

## 2025-04-21 NOTE — TELEPHONE ENCOUNTER
Refill Routing Note   Medication(s) are not appropriate for processing by [facility] for the following reason(s):        Outside of protocol    [facility] action(s):  Route               Appointments  past 12m or future 3m with PCP    Date Provider   Last Visit   3/17/2025 Kole Sharpe MD   Next Visit   9/17/2025 Kole Sharpe MD   ED visits in past 90 days: 0        Note composed:10:54 AM 04/21/2025

## 2025-04-30 ENCOUNTER — OFFICE VISIT (OUTPATIENT)
Dept: PSYCHIATRY | Facility: CLINIC | Age: 70
End: 2025-04-30
Payer: MEDICARE

## 2025-04-30 VITALS
WEIGHT: 206.25 LBS | SYSTOLIC BLOOD PRESSURE: 166 MMHG | DIASTOLIC BLOOD PRESSURE: 79 MMHG | HEART RATE: 64 BPM | BODY MASS INDEX: 27.97 KG/M2

## 2025-04-30 DIAGNOSIS — F32.A DEPRESSION, UNSPECIFIED DEPRESSION TYPE: ICD-10-CM

## 2025-04-30 PROCEDURE — 3078F DIAST BP <80 MM HG: CPT | Mod: CPTII,S$GLB,,

## 2025-04-30 PROCEDURE — 4010F ACE/ARB THERAPY RXD/TAKEN: CPT | Mod: CPTII,S$GLB,,

## 2025-04-30 PROCEDURE — 99999 PR PBB SHADOW E&M-EST. PATIENT-LVL II: CPT | Mod: PBBFAC,,,

## 2025-04-30 PROCEDURE — 3066F NEPHROPATHY DOC TX: CPT | Mod: CPTII,S$GLB,,

## 2025-04-30 PROCEDURE — 3061F NEG MICROALBUMINURIA REV: CPT | Mod: CPTII,S$GLB,,

## 2025-04-30 PROCEDURE — 3008F BODY MASS INDEX DOCD: CPT | Mod: CPTII,S$GLB,,

## 2025-04-30 PROCEDURE — G2211 COMPLEX E/M VISIT ADD ON: HCPCS | Mod: S$GLB,,,

## 2025-04-30 PROCEDURE — 3044F HG A1C LEVEL LT 7.0%: CPT | Mod: CPTII,S$GLB,,

## 2025-04-30 PROCEDURE — 3077F SYST BP >= 140 MM HG: CPT | Mod: CPTII,S$GLB,,

## 2025-04-30 PROCEDURE — 99214 OFFICE O/P EST MOD 30 MIN: CPT | Mod: S$GLB,,,

## 2025-04-30 RX ORDER — QUETIAPINE FUMARATE 100 MG/1
100 TABLET, FILM COATED ORAL NIGHTLY
Qty: 90 TABLET | Refills: 0 | Status: SHIPPED | OUTPATIENT
Start: 2025-04-30 | End: 2025-04-30 | Stop reason: SDUPTHER

## 2025-04-30 RX ORDER — ESCITALOPRAM OXALATE 5 MG/1
5 TABLET ORAL DAILY
Qty: 30 TABLET | Refills: 11 | Status: SHIPPED | OUTPATIENT
Start: 2025-04-30 | End: 2026-04-30

## 2025-04-30 RX ORDER — QUETIAPINE FUMARATE 100 MG/1
100 TABLET, FILM COATED ORAL NIGHTLY
Qty: 90 TABLET | Refills: 0 | Status: SHIPPED | OUTPATIENT
Start: 2025-04-30

## 2025-04-30 NOTE — PROGRESS NOTES
Outpatient Psychiatry Follow-Up Visit (MD/NP)    4/30/2025    Clinical Status of Patient:  Outpatient (Ambulatory)    Chief Complaint:  Tej Purdy is a 69 y.o. male who presents today for follow-up of mood disorder.  Met with patient.      Interval History and Content of Current Session:  Interim Events/Subjective Report/Content of Current Session:     Patient's primary care physician no longer wishes to prescribe patient wellbutrin due to seizure status. He had a seizure in jan, has no memory of it, and a week of tests, MRIs, nothing. He has had increasing anxiety of late unfortunately, as wellbutrin has been discontinued due to the hx of seizure. He wishes to go back on it. Discussed with patient that is not ideal for him, and discussed further options. Will trial lexapro at 5mg, discussed risks benefits and expectations. If he does not have any side effects we will increase to a more therapeutic dose. He is amenable to this plan.      Current medication regimen includes       CURRENT PSYCHOTROPIC REGIMEN:  Wellbutrin 150 - DISCONTINUED due to seizure status  +Lexapro 5mg     Seroquel 100mg  Diazepam 5mg    Trazodone - Not helpful       Psychotherapy:  Target symptoms: depression, anxiety   Why chosen therapy is appropriate versus another modality: relevant to diagnosis  Outcome monitoring methods: self-report, observation  Therapeutic intervention type: insight oriented psychotherapy  Topics discussed/themes: building skills sets for symptom management, symptom recognition  The patient's response to the intervention is accepting. The patient's progress toward treatment goals is good.   Duration of intervention: 05 minutes.    Review of Systems   PSYCHIATRIC: Pertinant items are noted in the narrative.    Past Medical, Family and Social History: The patient's past medical, family and social history have been reviewed and updated as appropriate within the electronic medical record - see encounter  notes.    Compliance: yes    Side effects: None    Risk Parameters:  Patient reports no suicidal ideation  Patient reports no homicidal ideation  Patient reports no self-injurious behavior  Patient reports no violent behavior    Exam (detailed: at least 9 elements; comprehensive: all 15 elements)   Constitutional  Vitals:  Most recent vital signs, dated less than 90 days prior to this appointment, were reviewed.   There were no vitals filed for this visit.     General:  unremarkable, age appropriate     Musculoskeletal  Muscle Strength/Tone:  no tremor, no tic   Gait & Station:  non-ataxic     Psychiatric  Speech:  no latency; no press   Mood & Affect:  anxious  congruent and appropriate   Thought Process:  normal and logical   Associations:  intact   Thought Content:  normal, no suicidality, no homicidality, delusions, or paranoia   Insight:  intact   Judgement: behavior is adequate to circumstances   Orientation:  grossly intact   Memory: intact for content of interview   Language: grossly intact   Attention Span & Concentration:  able to focus   Fund of Knowledge:  intact and appropriate to age and level of education     Assessment and Diagnosis   Status/Progress: Based on the examination today, the patient's problem(s) is/are adequately but not ideally controlled.  New problems have not been presented today.   Co-morbidities, Diagnostic uncertainty, and Lack of compliance are not complicating management of the primary condition.  There are no active rule-out diagnoses for this patient at this time.     General Impression:   1. Depression, unspecified depression type  QUEtiapine (SEROQUEL) 100 MG Tab    DISCONTINUED: QUEtiapine (SEROQUEL) 100 MG Tab            Intervention/Counseling/Treatment Plan   Medication Management: Continue current medications. The risks and benefits of medication were discussed with the patient.      Labs: reviewed most recent. The treatment plan and follow up plan were reviewed with the  patient. Discussed with patient informed consent, risks vs. benefits, alternative treatments, side effect profile and the inherent unpredictability of individual responses to these treatments. The patient expresses understanding of the above and displays the capacity to agree with this current plan and had no other questions. Encouraged Patient to keep future appointments. Take medications as prescribed and abstain from substance abuse. In the event of an emergency patient was advised to go to the emergency room.    Return to Clinic: 3 weeks, 1 month, as scheduled, as needed

## 2025-05-05 ENCOUNTER — LAB VISIT (OUTPATIENT)
Dept: LAB | Facility: HOSPITAL | Age: 70
End: 2025-05-05
Attending: INTERNAL MEDICINE
Payer: MEDICARE

## 2025-05-05 ENCOUNTER — PATIENT MESSAGE (OUTPATIENT)
Dept: ENDOCRINOLOGY | Facility: CLINIC | Age: 70
End: 2025-05-05
Payer: MEDICARE

## 2025-05-05 DIAGNOSIS — Z79.4 TYPE 2 DIABETES MELLITUS WITH HYPERGLYCEMIA, WITH LONG-TERM CURRENT USE OF INSULIN: ICD-10-CM

## 2025-05-05 DIAGNOSIS — E11.65 TYPE 2 DIABETES MELLITUS WITH HYPERGLYCEMIA, WITH LONG-TERM CURRENT USE OF INSULIN: ICD-10-CM

## 2025-05-05 LAB
ALBUMIN SERPL BCP-MCNC: 3.6 G/DL (ref 3.5–5.2)
ANION GAP (OHS): 8 MMOL/L (ref 8–16)
BUN SERPL-MCNC: 42 MG/DL (ref 8–23)
CALCIUM SERPL-MCNC: 9 MG/DL (ref 8.7–10.5)
CHLORIDE SERPL-SCNC: 104 MMOL/L (ref 95–110)
CO2 SERPL-SCNC: 26 MMOL/L (ref 23–29)
CREAT SERPL-MCNC: 1.4 MG/DL (ref 0.5–1.4)
EAG (OHS): 126 MG/DL (ref 68–131)
GFR SERPLBLD CREATININE-BSD FMLA CKD-EPI: 54 ML/MIN/1.73/M2
GLUCOSE SERPL-MCNC: 59 MG/DL (ref 70–110)
HBA1C MFR BLD: 6 % (ref 4–5.6)
PHOSPHATE SERPL-MCNC: 4 MG/DL (ref 2.7–4.5)
POTASSIUM SERPL-SCNC: 4.6 MMOL/L (ref 3.5–5.1)
SODIUM SERPL-SCNC: 138 MMOL/L (ref 136–145)

## 2025-05-05 PROCEDURE — 83036 HEMOGLOBIN GLYCOSYLATED A1C: CPT

## 2025-05-05 PROCEDURE — 36415 COLL VENOUS BLD VENIPUNCTURE: CPT | Mod: PN

## 2025-05-05 PROCEDURE — 82040 ASSAY OF SERUM ALBUMIN: CPT

## 2025-05-07 ENCOUNTER — PATIENT MESSAGE (OUTPATIENT)
Dept: PRIMARY CARE CLINIC | Facility: CLINIC | Age: 70
End: 2025-05-07
Payer: MEDICARE

## 2025-05-07 DIAGNOSIS — I48.19 PERSISTENT ATRIAL FIBRILLATION: Chronic | ICD-10-CM

## 2025-05-07 NOTE — TELEPHONE ENCOUNTER
LOV 3/17/25  Annual 9/16/24  RT 9/17/25    Patient is requesting a refill for eliquis, This was prescribed by cardiology.

## 2025-05-07 NOTE — TELEPHONE ENCOUNTER
No care due was identified.  [facility] Embedded Care Due Messages. Reference number: [id number].   5/07/2025 3:27:12 PM CDT

## 2025-05-08 PROBLEM — Z95.0 CARDIAC PACEMAKER IN SITU: Status: ACTIVE | Noted: 2025-05-08

## 2025-05-19 ENCOUNTER — OFFICE VISIT (OUTPATIENT)
Dept: ELECTROPHYSIOLOGY | Facility: CLINIC | Age: 70
End: 2025-05-19
Payer: MEDICARE

## 2025-05-19 ENCOUNTER — HOSPITAL ENCOUNTER (OUTPATIENT)
Dept: CARDIOLOGY | Facility: CLINIC | Age: 70
Discharge: HOME | End: 2025-05-19
Payer: MEDICARE

## 2025-05-19 ENCOUNTER — CLINICAL SUPPORT (OUTPATIENT)
Dept: CARDIOLOGY | Facility: HOSPITAL | Age: 70
End: 2025-05-19
Attending: INTERNAL MEDICINE
Payer: MEDICARE

## 2025-05-19 VITALS
BODY MASS INDEX: 27.66 KG/M2 | WEIGHT: 203.94 LBS | SYSTOLIC BLOOD PRESSURE: 121 MMHG | HEART RATE: 61 BPM | DIASTOLIC BLOOD PRESSURE: 67 MMHG

## 2025-05-19 DIAGNOSIS — I44.7 LBBB (LEFT BUNDLE BRANCH BLOCK): ICD-10-CM

## 2025-05-19 DIAGNOSIS — I48.19 PERSISTENT ATRIAL FIBRILLATION: ICD-10-CM

## 2025-05-19 DIAGNOSIS — Z95.0 CARDIAC PACEMAKER IN SITU: Primary | ICD-10-CM

## 2025-05-19 DIAGNOSIS — I10 PRIMARY HYPERTENSION: Chronic | ICD-10-CM

## 2025-05-19 DIAGNOSIS — I50.22 CHRONIC SYSTOLIC HEART FAILURE: ICD-10-CM

## 2025-05-19 DIAGNOSIS — Z92.89 HISTORY OF CARDIOVERSION: ICD-10-CM

## 2025-05-19 DIAGNOSIS — Z79.01 CHRONIC ANTICOAGULATION: ICD-10-CM

## 2025-05-19 DIAGNOSIS — I48.91 ATRIAL FIBRILLATION WITH RAPID VENTRICULAR RESPONSE: ICD-10-CM

## 2025-05-19 LAB
OHS QRS DURATION: 174 MS
OHS QTC CALCULATION: 503 MS

## 2025-05-19 PROCEDURE — 3061F NEG MICROALBUMINURIA REV: CPT | Mod: CPTII,S$GLB,, | Performed by: NURSE PRACTITIONER

## 2025-05-19 PROCEDURE — 93010 ELECTROCARDIOGRAM REPORT: CPT | Mod: S$GLB,,, | Performed by: INTERNAL MEDICINE

## 2025-05-19 PROCEDURE — 3288F FALL RISK ASSESSMENT DOCD: CPT | Mod: CPTII,S$GLB,, | Performed by: NURSE PRACTITIONER

## 2025-05-19 PROCEDURE — 93280 PM DEVICE PROGR EVAL DUAL: CPT

## 2025-05-19 PROCEDURE — 1100F PTFALLS ASSESS-DOCD GE2>/YR: CPT | Mod: CPTII,S$GLB,, | Performed by: NURSE PRACTITIONER

## 2025-05-19 PROCEDURE — 93005 ELECTROCARDIOGRAM TRACING: CPT | Mod: S$GLB,,, | Performed by: INTERNAL MEDICINE

## 2025-05-19 PROCEDURE — 3078F DIAST BP <80 MM HG: CPT | Mod: CPTII,S$GLB,, | Performed by: NURSE PRACTITIONER

## 2025-05-19 PROCEDURE — 4010F ACE/ARB THERAPY RXD/TAKEN: CPT | Mod: CPTII,S$GLB,, | Performed by: NURSE PRACTITIONER

## 2025-05-19 PROCEDURE — 99999 PR PBB SHADOW E&M-EST. PATIENT-LVL III: CPT | Mod: PBBFAC,,, | Performed by: NURSE PRACTITIONER

## 2025-05-19 PROCEDURE — 3008F BODY MASS INDEX DOCD: CPT | Mod: CPTII,S$GLB,, | Performed by: NURSE PRACTITIONER

## 2025-05-19 PROCEDURE — 3074F SYST BP LT 130 MM HG: CPT | Mod: CPTII,S$GLB,, | Performed by: NURSE PRACTITIONER

## 2025-05-19 PROCEDURE — 3044F HG A1C LEVEL LT 7.0%: CPT | Mod: CPTII,S$GLB,, | Performed by: NURSE PRACTITIONER

## 2025-05-19 PROCEDURE — 1126F AMNT PAIN NOTED NONE PRSNT: CPT | Mod: CPTII,S$GLB,, | Performed by: NURSE PRACTITIONER

## 2025-05-19 PROCEDURE — 3066F NEPHROPATHY DOC TX: CPT | Mod: CPTII,S$GLB,, | Performed by: NURSE PRACTITIONER

## 2025-05-19 PROCEDURE — 99214 OFFICE O/P EST MOD 30 MIN: CPT | Mod: S$GLB,,, | Performed by: NURSE PRACTITIONER

## 2025-05-19 PROCEDURE — 1159F MED LIST DOCD IN RCRD: CPT | Mod: CPTII,S$GLB,, | Performed by: NURSE PRACTITIONER

## 2025-05-19 PROCEDURE — 1160F RVW MEDS BY RX/DR IN RCRD: CPT | Mod: CPTII,S$GLB,, | Performed by: NURSE PRACTITIONER

## 2025-05-20 ENCOUNTER — PATIENT MESSAGE (OUTPATIENT)
Dept: TRANSPLANT | Facility: CLINIC | Age: 70
End: 2025-05-20
Payer: MEDICARE

## 2025-05-20 NOTE — PROGRESS NOTES
Causing a cough. Feeling well otherwise. Exam benign. Recommend daily oral antihistamine and supportive care otherwise. Follow up as needed.  Orders:  •  cetirizine (ZyrTEC) oral solution; Take 2.5 mL (2.5 mg total) by mouth daily at bedtime   Mr. Purdy is a patient of Dr. Arambula and was last seen in clinic 1/24/2024.      Subjective:   Patient ID:  Tej Purdy is a 69 y.o. male who presents for follow up of Atrial Fibrillation and Pacemaker Check  .     HPI:    Mr. Purdy is a 69 y.o. male with HTN, HLD, DM2 , PAD, HCC s/p TACE/RFA (12/2020), CAD (PCI 12/2023), liver tx (1/2022), persistent AF, AS (BAV 2/1/24, TAVR 2/18/2025) here for follow up after PPM implantation.     Background:    Former pt of Dr. Tilley    OLT 1/6/2021  On 5/7/21, Mr. Purdy presented for RHC and pre-procedure ECG showed AF with RVR. He was relatively asymptomatic, noting some mild dizziness. He was subsequently started on metoprolol 25 mg ER daily and apixaban 5 mg bid. He was referred to EP.      At initial EP visit, ECG showed AF with RVR. Metoprolol was increased to 50 mg daily and ASHISH/DCCV recommended. On 5/19/21, Mr. Neumann underwent successful cardioversion and was started on flecainide 100 mg bid at discharge. Several days later on 5/24/21 he presented to the ED with SOB and HTN, resulting in flash pulmonary edema. He was started on lasix. He returned to [facility] [place] 5/31/21 for ASHISH/DCCV. He had restoration of sinus rhythm and flecainide was increased to 150 mg bid. Approximately one week later, Mr. Neumann was seen in cardiology clinic where he was found to be hypotensive with NEVIN due to over diuresis.  He was sent to the ED where ECG showed AF. Flecainide and lasix were stopped.    6/16/2021 he did not notice any significant symptom improvement following DCCV and has continued to feel lightheaded and weak. Returned on 7/7/21 for ASHISH/DCCV. He had restoration of sinus rhythm and was then started on amiodarone with oral load. ECG at one week follow up showed recurrence of AF.   7/27/21 Mr. Purdy returned for cardioversion and had restoration of sinus rhythm.   He was continued on amiodarone and eliquis.      He underwent OLT on 1/6/21. Of note, path shows  cholangiocarcinoma, not HCC as previously diagnosed. He's had ups and downs since then, but nothing c/w AF.  ECG is NSR  Maintaining NSR on amio; continue for now.  On f/u in 6 mos, we'll consider continuing amio, vs. trying other options (e.g., trial of return to a 1C agent, or tikosyn, or PVI).    12/26/2023: CLARENCE to 1st Mrg. Amiodarone discontinued in hospital.    1/24/2024: He is 1 mo s/p hospitalization for CP. Concern for MI and amiodarone was discontinued. He underwent LHC and 1st Mrg lesion was 90% stenosed with 0% stenosis post-intervention. TTE showed EF 20-25% (was 60% in 2022).  Patient reports some symptom improvement since stent but continues to have fatigue and SAMSON. ECG reviewed with Dr. Tilley. Possible AT or AFL on ECG. Will increase metoprolol and schedule pt for adenosine test and possible ASHISH/DCCV. Will likely restart amio afterwards. Pt remains on eliquis.      Update (05/19/2025):    1/30/2024: TUNG showing EF 10-15% and severe AS     2/1/2024: balloon aortic valvuloplasty     10/2024: EF 60%    2/18/2025: severe AS s/p BAV 2/1/24 who presents for TAVR. EKG after procedure is showing LBBB with QRS measuring up to 160 ms. EP consulted for management and monitoring of rhythm     2/19/2025:  Status-post EPS post-TAVR, with new LBBB and HV interval 80-85 ms.  Status-post Medtronic dual chamber pacemaker (LLBAP lead).  Complex (22 mod) procedure given extra time, equipment, technical expertise required to place a left bundle branch area pacing lead.    Today he reports that last week (around the 15th) he felt unwell. Felt weak, LH, diaphoresis. Issues with stomach fullness/dry heaves. Cannot eat much with meals. Some left arm pain. No exertional CP, worsening SAMSON, syncope reported.    He is currently taking eliquis 5mg BID for stroke prophylaxis and denies significant bleeding episodes. He is currently being treated with carvedilol 12.5mg BID for HR control. Kidney function is stable, with a creatinine  of 1.4 on 5/5/2025.    Device Interrogation (5/19/2025) reveals an intrinsic SR with 1st deg AVB with stable lead and device function.  EKGs were recorded in bipolar and unipolar pacing configurations. LBBA pacing lead impedance bipolar 570 ohms, unipolar 418 ohms; LBBA capture threshold bipolar 0.75 V @ 0.4 ms, unipolar 0.5 V @ 0.4 ms.  Atrial arrhythmias: AT/AF x 1; duration 33 hrs. No ventricular arrhythmias. He paces 82.5% in the RA and 0.5% in the LBBA. Estimated battery longevity 13.9 years.   Rate response added in device clinic to address flat histograms.    I have personally reviewed the patient's EKG today, which shows APVS with LBBB at .61bpm OH interval is 246. QRS is 174. QTc is 503.    Relevant Cardiac Test Results:    2D Echo (3/18/2025):    Left Ventricle: The left ventricle is normal in size. Normal wall thickness. There is normal systolic function with a visually estimated ejection fraction of 60 - 65%. There is normal diastolic function.    Right Ventricle: The right ventricle is normal in size. Wall thickness is normal. Systolic function is normal.    Aortic Valve: There is a transcatheter valve replacement in the aortic position. It is reported to be a 34 mm Evolut and Medtronic valve. There is mild stenosis. Aortic valve area by VTI is 2.4 cm2. Aortic valve peak velocity is 2.6 m/s. Mean gradient is 13 mmHg. The dimensionless index is 0.50.    Pulmonary Artery: No pulmonary hypertension. The estimated pulmonary artery systolic pressure is 30 mmHg.    IVC/SVC: Normal venous pressure at 3 mmHg.    Frequent extrasystoles present throughout the study    Current Outpatient Medications   Medication Sig    apixaban (ELIQUIS) 5 mg Tab Take 1 tablet (5 mg total) by mouth 2 (two) times daily.    atorvastatin (LIPITOR) 80 MG tablet Take 1 tablet (80 mg total) by mouth once daily.    atorvastatin (LIPITOR) 80 MG tablet Take 1 tablet (80 mg total) by mouth once daily.    blood sugar diagnostic Strp To check  BG 2 times daily, to use with insurance preferred meter (patient has a TrueMetrix self-monitoring glucose meter)    blood-glucose meter kit To check BG 2 times daily, to use with insurance preferred meter    carvediloL (COREG) 12.5 MG tablet Take 1 tablet (12.5 mg total) by mouth 2 (two) times daily with meals.    cycloSPORINE modified, NEORAL, (NEORAL) 25 MG capsule Take 2 capsules (50 mg total) by mouth every morning AND 1 capsule (25 mg total) every evening.    diazePAM (VALIUM) 5 MG tablet Take 1 tablet (5 mg total) by mouth as needed for Anxiety. Take 1 tab 30 min prior to MRI, take 2nd as needed    empagliflozin (JARDIANCE) 10 mg tablet Take 1 tablet (10 mg total) by mouth once daily.    EScitalopram oxalate (LEXAPRO) 5 MG Tab Take 1 tablet (5 mg total) by mouth once daily.    ferrous sulfate (IRON) 325 mg (65 mg iron) Tab tablet Take 1 tablet (325 mg total) by mouth once daily.    furosemide (LASIX) 20 MG tablet Take 1 tablet (20 mg total) by mouth once daily.    furosemide (LASIX) 20 MG tablet Take 1 tablet (20 mg total) by mouth once daily.    HYDROcodone-acetaminophen (NORCO) 5-325 mg per tablet Take 1 tablet by mouth every 8 (eight) hours as needed for Pain.    insulin glargine U-100, Lantus, (LANTUS SOLOSTAR U-100 INSULIN) 100 unit/mL (3 mL) InPn pen Inject 22 Units into the skin every evening.    insulin lispro (HUMALOG KWIKPEN INSULIN) 100 unit/mL pen Inject 6 Units into the skin 3 (three) times daily with meals. Plus sliding scale, MDD: 48 units    lancets Misc To check BG 2 times daily, to use with insurance preferred meter    levETIRAcetam (KEPPRA) 500 MG Tab Take 1 tablet (500 mg total) by mouth 2 (two) times daily.    magnesium oxide (MAG-OX) 400 mg (241.3 mg magnesium) tablet Take 1 tablet (400 mg total) by mouth 2 (two) times daily.    multivitamin capsule Take 1 capsule by mouth once daily.    mycophenolate (CELLCEPT) 250 mg Cap Take 2 capsules (500 mg total) by mouth 2 (two) times daily.     "omega-3 fatty acids/fish oil (FISH OIL-OMEGA-3 FATTY ACIDS) 300-1,000 mg capsule Take 1 capsule by mouth once daily.     pantoprazole (PROTONIX) 40 MG tablet Take 1 tablet (40 mg total) by mouth once daily.    pen needle, diabetic (BD ULTRA-FINE GÉNESIS PEN NEEDLE) 32 gauge x 5/32" Ndle Use to inject insulin 4 times daily    pregabalin (LYRICA) 75 MG capsule Take 1 capsule (75 mg total) by mouth 2 (two) times daily.    QUEtiapine (SEROQUEL) 100 MG Tab Take 1 tablet (100 mg total) by mouth every evening.    sacubitriL-valsartan (ENTRESTO) 49-51 mg per tablet Take 1 tablet by mouth 2 (two) times daily.     Current Facility-Administered Medications   Medication    sodium chloride 0.9% flush 10 mL       Review of Systems   Constitutional: Positive for malaise/fatigue (improved).   Cardiovascular:  Positive for dyspnea on exertion. Negative for chest pain, irregular heartbeat, leg swelling and palpitations.   Respiratory:  Negative for shortness of breath.    Hematologic/Lymphatic: Negative for bleeding problem.   Skin:  Negative for rash.   Musculoskeletal:  Negative for myalgias.   Gastrointestinal:  Positive for bloating and vomiting. Negative for hematemesis, hematochezia and nausea.   Genitourinary:  Negative for hematuria.   Neurological:  Positive for light-headedness.   Psychiatric/Behavioral:  Negative for altered mental status.    Allergic/Immunologic: Negative for persistent infections.       Objective:          /67 (Patient Position: Sitting)   Pulse 61   Wt 92.5 kg (203 lb 14.8 oz)   BMI 27.66 kg/m²     Physical Exam  Vitals and nursing note reviewed.   Constitutional:       Appearance: Normal appearance. He is well-developed.   HENT:      Head: Normocephalic.      Nose: Nose normal.   Eyes:      Pupils: Pupils are equal, round, and reactive to light.   Cardiovascular:      Rate and Rhythm: Normal rate and regular rhythm.   Pulmonary:      Effort: No respiratory distress.   Chest:      Comments: Device " to KHARI. Incision and pocket in good repair.    Musculoskeletal:         General: Normal range of motion.   Skin:     General: Skin is warm and dry.      Findings: No erythema.   Neurological:      Mental Status: He is alert and oriented to person, place, and time.   Psychiatric:         Speech: Speech normal.         Behavior: Behavior normal.           Lab Results   Component Value Date     05/05/2025     02/18/2025    K 4.6 05/05/2025    K 4.0 02/18/2025    MG 2.1 01/11/2025    BUN 42 (H) 05/05/2025    CREATININE 1.4 05/05/2025    ALT 10 02/12/2025    AST 17 02/12/2025    HGB 14.1 04/02/2025    HGB 13.9 (L) 03/18/2025    HCT 46.6 04/02/2025    HCT 45.4 03/18/2025    HCT 39 08/25/2024    TSH 0.435 01/08/2025    LDLCALC 47.6 (L) 04/02/2025       Recent Labs   Lab 01/11/25  0344 01/12/25  0340 01/13/25  0334 01/14/25  0806   INR 1.3 H 1.3 H 1.3 H 1.4 H       Assessment:     1. Cardiac pacemaker in situ - LBBA lead    2. Atrial fibrillation with rapid ventricular response    3. Chronic systolic heart failure    4. LBBB (left bundle branch block)    5. Primary hypertension    6. Chronic anticoagulation    7. History of cardioversion      Plan:     In summary, Mr. Purdy is a 69 y.o. male with HTN, HLD, DM2 , PAD, HCC s/p TACE/RFA (12/2020), CAD (PCI 12/2023), liver tx (1/2022), persistent AF, AS (BAV 2/1/24, TAVR 2/18/2025) here for follow up after PPM implantation.   Former pt of Dr. Tilley last seen in clinic Jan 2024. Since that visit he underwent BAV 2/2024 and TAVR 2/2025. After TAVR he had new LBBB. EP study showed HV interval 80-85 ms and he underwent implantation of dual chamber PPM with LBBA pacing lead. He is stable from a device perspective with stable lead and device function. LBBA parameters WNL. ECG showing stable pacing morphology and LVAT. He is currently not LBBA pacing as his EF is 60-65% per echo 3/2025. If his EF decreases in the future, can utilize resynchronization therapy with PPM. He  is currently on eliquis for CVA prophylaxis. One AF episode lasting 33 hours on 5/15/2025. He was not feeling well on this day. Due to low AF burden, will not make medication changes today.   Rate response added in device clinic to address flat histograms.    Continue current medication regimen and device settings.   Follow up in device clinic as scheduled.   Follow up in EP clinic in 6 mo with Dr. Arambula, sooner as needed.     60 minutes of total time spent on the encounter, which includes face to face time and non-face to face time preparing to see the patient (eg, review of tests), Obtaining and/or reviewing separately obtained history, Documenting clinical information in the electronic or other health record, Independently interpreting results (not separately reported) and communicating results to the patient/family/caregiver, or Care coordination (not separately reported).  Visit today included increased complexity associated with the care of the episodic problem AF/TAVR/PPM addressed and managing the longitudinal care of the patient due to the serious and/or complex managed problem(s) .    *A copy of this note has been sent to Dr. Arambula*    Follow up in about 6 months (around 11/19/2025).    ------------------------------------------------------------------    NATE Maddox, NP-C  Cardiac Electrophysiology

## 2025-05-22 ENCOUNTER — CLINICAL SUPPORT (OUTPATIENT)
Dept: CARDIOLOGY | Facility: HOSPITAL | Age: 70
End: 2025-05-22
Attending: INTERNAL MEDICINE
Payer: MEDICARE

## 2025-05-22 ENCOUNTER — CLINICAL SUPPORT (OUTPATIENT)
Dept: CARDIOLOGY | Facility: HOSPITAL | Age: 70
End: 2025-05-22
Payer: MEDICARE

## 2025-05-22 DIAGNOSIS — Z95.0 PRESENCE OF CARDIAC PACEMAKER: ICD-10-CM

## 2025-05-22 DIAGNOSIS — I45.5 OTHER SPECIFIED HEART BLOCK: ICD-10-CM

## 2025-05-22 PROCEDURE — 93296 REM INTERROG EVL PM/IDS: CPT | Performed by: INTERNAL MEDICINE

## 2025-05-23 ENCOUNTER — LAB VISIT (OUTPATIENT)
Dept: LAB | Facility: HOSPITAL | Age: 70
End: 2025-05-23
Attending: INTERNAL MEDICINE
Payer: MEDICARE

## 2025-05-23 DIAGNOSIS — Z94.4 S/P LIVER TRANSPLANT: ICD-10-CM

## 2025-05-23 LAB
ABSOLUTE EOSINOPHIL (OHS): 0.33 K/UL
ABSOLUTE MONOCYTE (OHS): 0.95 K/UL (ref 0.3–1)
ABSOLUTE NEUTROPHIL COUNT (OHS): 5.73 K/UL (ref 1.8–7.7)
ALBUMIN SERPL BCP-MCNC: 3.8 G/DL (ref 3.5–5.2)
ALP SERPL-CCNC: 85 UNIT/L (ref 40–150)
ALT SERPL W/O P-5'-P-CCNC: 21 UNIT/L (ref 10–44)
ANION GAP (OHS): 9 MMOL/L (ref 8–16)
AST SERPL-CCNC: 28 UNIT/L (ref 11–45)
BASOPHILS # BLD AUTO: 0.07 K/UL
BASOPHILS NFR BLD AUTO: 0.8 %
BILIRUB SERPL-MCNC: 0.8 MG/DL (ref 0.1–1)
BUN SERPL-MCNC: 28 MG/DL (ref 8–23)
CALCIUM SERPL-MCNC: 9.1 MG/DL (ref 8.7–10.5)
CHLORIDE SERPL-SCNC: 103 MMOL/L (ref 95–110)
CO2 SERPL-SCNC: 25 MMOL/L (ref 23–29)
CREAT SERPL-MCNC: 1.4 MG/DL (ref 0.5–1.4)
ERYTHROCYTE [DISTWIDTH] IN BLOOD BY AUTOMATED COUNT: 16.9 % (ref 11.5–14.5)
GFR SERPLBLD CREATININE-BSD FMLA CKD-EPI: 54 ML/MIN/1.73/M2
GLUCOSE SERPL-MCNC: 74 MG/DL (ref 70–110)
HCT VFR BLD AUTO: 49.4 % (ref 40–54)
HGB BLD-MCNC: 15.2 GM/DL (ref 14–18)
IMM GRANULOCYTES # BLD AUTO: 0.04 K/UL (ref 0–0.04)
IMM GRANULOCYTES NFR BLD AUTO: 0.5 % (ref 0–0.5)
LYMPHOCYTES # BLD AUTO: 1.71 K/UL (ref 1–4.8)
MCH RBC QN AUTO: 27.2 PG (ref 27–31)
MCHC RBC AUTO-ENTMCNC: 30.8 G/DL (ref 32–36)
MCV RBC AUTO: 88 FL (ref 82–98)
NUCLEATED RBC (/100WBC) (OHS): 0 /100 WBC
PLATELET # BLD AUTO: 101 K/UL (ref 150–450)
PMV BLD AUTO: 12.4 FL (ref 9.2–12.9)
POTASSIUM SERPL-SCNC: 4.6 MMOL/L (ref 3.5–5.1)
PROT SERPL-MCNC: 7 GM/DL (ref 6–8.4)
RBC # BLD AUTO: 5.59 M/UL (ref 4.6–6.2)
RELATIVE EOSINOPHIL (OHS): 3.7 %
RELATIVE LYMPHOCYTE (OHS): 19.4 % (ref 18–48)
RELATIVE MONOCYTE (OHS): 10.8 % (ref 4–15)
RELATIVE NEUTROPHIL (OHS): 64.8 % (ref 38–73)
SODIUM SERPL-SCNC: 137 MMOL/L (ref 136–145)
WBC # BLD AUTO: 8.83 K/UL (ref 3.9–12.7)

## 2025-05-23 PROCEDURE — 36415 COLL VENOUS BLD VENIPUNCTURE: CPT | Mod: PN

## 2025-05-23 PROCEDURE — 85025 COMPLETE CBC W/AUTO DIFF WBC: CPT

## 2025-05-23 PROCEDURE — 80158 DRUG ASSAY CYCLOSPORINE: CPT

## 2025-05-23 PROCEDURE — 82040 ASSAY OF SERUM ALBUMIN: CPT

## 2025-05-24 LAB — CYCLOSPORINE BLD LC/MS/MS-MCNC: 63 NG/ML (ref 100–400)

## 2025-05-27 ENCOUNTER — RESULTS FOLLOW-UP (OUTPATIENT)
Dept: TRANSPLANT | Facility: CLINIC | Age: 70
End: 2025-05-27
Payer: MEDICARE

## 2025-05-27 DIAGNOSIS — R97.8 OTHER ABNORMAL TUMOR MARKERS: ICD-10-CM

## 2025-05-27 DIAGNOSIS — C22.1 CHOLANGIOCARCINOMA: Primary | ICD-10-CM

## 2025-05-27 DIAGNOSIS — M85.80 OSTEOPENIA, UNSPECIFIED LOCATION: ICD-10-CM

## 2025-05-27 DIAGNOSIS — Z94.4 S/P LIVER TRANSPLANT: ICD-10-CM

## 2025-05-27 DIAGNOSIS — M81.0 AGE-RELATED OSTEOPOROSIS WITHOUT CURRENT PATHOLOGICAL FRACTURE: ICD-10-CM

## 2025-05-27 NOTE — TELEPHONE ENCOUNTER
Sent patient message via portal to let him know labs are stable.  No medication changes, next labs due on 7/21/25      ----- Message from Thais Maxwell MD sent at 5/27/2025  8:49 AM CDT -----  The Labs are stable - please let patient know.  ----- Message -----  From: Lab, Background User  Sent: 5/23/2025   4:34 PM CDT  To: Thais Maxwell MD

## 2025-05-30 ENCOUNTER — OFFICE VISIT (OUTPATIENT)
Dept: PSYCHIATRY | Facility: CLINIC | Age: 70
End: 2025-05-30
Payer: MEDICARE

## 2025-05-30 DIAGNOSIS — F33.41 RECURRENT MAJOR DEPRESSIVE DISORDER, IN PARTIAL REMISSION: Primary | ICD-10-CM

## 2025-06-10 LAB
OHS CV AF BURDEN PERCENT: < 1
OHS CV DC REMOTE DEVICE TYPE: NORMAL
OHS CV RV PACING PERCENT: 0.04 %

## 2025-06-17 ENCOUNTER — PATIENT MESSAGE (OUTPATIENT)
Dept: ENDOCRINOLOGY | Facility: CLINIC | Age: 70
End: 2025-06-17
Payer: MEDICARE

## 2025-06-17 ENCOUNTER — TELEPHONE (OUTPATIENT)
Dept: CARDIOLOGY | Facility: CLINIC | Age: 70
End: 2025-06-17
Payer: MEDICARE

## 2025-06-17 DIAGNOSIS — Z79.4 TYPE 2 DIABETES MELLITUS WITHOUT COMPLICATION, WITH LONG-TERM CURRENT USE OF INSULIN: ICD-10-CM

## 2025-06-17 DIAGNOSIS — E11.9 TYPE 2 DIABETES MELLITUS WITHOUT COMPLICATION, WITH LONG-TERM CURRENT USE OF INSULIN: ICD-10-CM

## 2025-06-17 RX ORDER — LANCETS
EACH MISCELLANEOUS
Qty: 100 EACH | Refills: 11 | Status: ACTIVE | OUTPATIENT
Start: 2025-06-17

## 2025-06-18 PROBLEM — I50.32 HEART FAILURE WITH RECOVERED EJECTION FRACTION (HFRECEF): Status: ACTIVE | Noted: 2024-01-27

## 2025-06-18 PROBLEM — I35.0 AORTIC STENOSIS: Status: RESOLVED | Noted: 2024-05-07 | Resolved: 2025-06-18

## 2025-06-18 PROBLEM — Z95.3 S/P TAVR (TRANSCATHETER AORTIC VALVE REPLACEMENT): Status: ACTIVE | Noted: 2025-06-18

## 2025-06-19 ENCOUNTER — OFFICE VISIT (OUTPATIENT)
Dept: CARDIOLOGY | Facility: CLINIC | Age: 70
End: 2025-06-19
Payer: MEDICARE

## 2025-06-19 ENCOUNTER — OFFICE VISIT (OUTPATIENT)
Dept: TRANSPLANT | Facility: CLINIC | Age: 70
End: 2025-06-19
Payer: MEDICARE

## 2025-06-19 VITALS
TEMPERATURE: 97 F | HEART RATE: 87 BPM | SYSTOLIC BLOOD PRESSURE: 133 MMHG | BODY MASS INDEX: 25.95 KG/M2 | DIASTOLIC BLOOD PRESSURE: 75 MMHG | HEIGHT: 72 IN | WEIGHT: 191.56 LBS | OXYGEN SATURATION: 95 % | RESPIRATION RATE: 16 BRPM

## 2025-06-19 VITALS
WEIGHT: 192 LBS | DIASTOLIC BLOOD PRESSURE: 80 MMHG | OXYGEN SATURATION: 96 % | HEART RATE: 91 BPM | HEIGHT: 72 IN | SYSTOLIC BLOOD PRESSURE: 122 MMHG | BODY MASS INDEX: 26.01 KG/M2

## 2025-06-19 DIAGNOSIS — Z94.4 S/P LIVER TRANSPLANT: Chronic | ICD-10-CM

## 2025-06-19 DIAGNOSIS — I25.10 CORONARY ARTERY DISEASE INVOLVING NATIVE CORONARY ARTERY OF NATIVE HEART WITHOUT ANGINA PECTORIS: Chronic | ICD-10-CM

## 2025-06-19 DIAGNOSIS — R56.9 SEIZURE: ICD-10-CM

## 2025-06-19 DIAGNOSIS — I10 PRIMARY HYPERTENSION: Chronic | ICD-10-CM

## 2025-06-19 DIAGNOSIS — I70.213 ATHEROSCLEROSIS OF NATIVE ARTERIES OF EXTREMITIES WITH INTERMITTENT CLAUDICATION, BILATERAL LEGS: ICD-10-CM

## 2025-06-19 DIAGNOSIS — Z94.4 HISTORY OF LIVER TRANSPLANT: ICD-10-CM

## 2025-06-19 DIAGNOSIS — Z29.89 PROPHYLACTIC IMMUNOTHERAPY: Primary | ICD-10-CM

## 2025-06-19 DIAGNOSIS — E78.2 MIXED HYPERLIPIDEMIA: ICD-10-CM

## 2025-06-19 DIAGNOSIS — I50.32 HEART FAILURE WITH RECOVERED EJECTION FRACTION (HFRECEF): ICD-10-CM

## 2025-06-19 DIAGNOSIS — Z95.3 S/P TAVR (TRANSCATHETER AORTIC VALVE REPLACEMENT): ICD-10-CM

## 2025-06-19 DIAGNOSIS — I73.9 CLAUDICATION: ICD-10-CM

## 2025-06-19 DIAGNOSIS — I50.22 CHRONIC SYSTOLIC HEART FAILURE: ICD-10-CM

## 2025-06-19 DIAGNOSIS — C22.1 CHOLANGIOCARCINOMA: Chronic | ICD-10-CM

## 2025-06-19 DIAGNOSIS — I73.9 PERIPHERAL ARTERIAL DISEASE: ICD-10-CM

## 2025-06-19 DIAGNOSIS — I48.19 PERSISTENT ATRIAL FIBRILLATION: ICD-10-CM

## 2025-06-19 DIAGNOSIS — I35.0 SEVERE AORTIC STENOSIS: Primary | ICD-10-CM

## 2025-06-19 DIAGNOSIS — Z95.5 PRESENCE OF DRUG-ELUTING STENT IN LEFT CIRCUMFLEX CORONARY ARTERY: ICD-10-CM

## 2025-06-19 PROCEDURE — 3075F SYST BP GE 130 - 139MM HG: CPT | Mod: CPTII,S$GLB,, | Performed by: INTERNAL MEDICINE

## 2025-06-19 PROCEDURE — 3066F NEPHROPATHY DOC TX: CPT | Mod: CPTII,S$GLB,, | Performed by: INTERNAL MEDICINE

## 2025-06-19 PROCEDURE — 3044F HG A1C LEVEL LT 7.0%: CPT | Mod: CPTII,S$GLB,, | Performed by: INTERNAL MEDICINE

## 2025-06-19 PROCEDURE — 3061F NEG MICROALBUMINURIA REV: CPT | Mod: CPTII,S$GLB,, | Performed by: INTERNAL MEDICINE

## 2025-06-19 PROCEDURE — 3008F BODY MASS INDEX DOCD: CPT | Mod: CPTII,S$GLB,, | Performed by: INTERNAL MEDICINE

## 2025-06-19 PROCEDURE — 1126F AMNT PAIN NOTED NONE PRSNT: CPT | Mod: CPTII,S$GLB,, | Performed by: INTERNAL MEDICINE

## 2025-06-19 PROCEDURE — 99999 PR PBB SHADOW E&M-EST. PATIENT-LVL V: CPT | Mod: PBBFAC,,, | Performed by: INTERNAL MEDICINE

## 2025-06-19 PROCEDURE — 99999 PR PBB SHADOW E&M-EST. PATIENT-LVL V: CPT | Mod: PBBFAC,GC,, | Performed by: STUDENT IN AN ORGANIZED HEALTH CARE EDUCATION/TRAINING PROGRAM

## 2025-06-19 PROCEDURE — 99215 OFFICE O/P EST HI 40 MIN: CPT | Mod: S$GLB,,, | Performed by: INTERNAL MEDICINE

## 2025-06-19 PROCEDURE — 3078F DIAST BP <80 MM HG: CPT | Mod: CPTII,S$GLB,, | Performed by: INTERNAL MEDICINE

## 2025-06-19 PROCEDURE — 4010F ACE/ARB THERAPY RXD/TAKEN: CPT | Mod: CPTII,S$GLB,, | Performed by: INTERNAL MEDICINE

## 2025-06-19 RX ORDER — CLOPIDOGREL BISULFATE 75 MG/1
75 TABLET ORAL DAILY
Qty: 30 TABLET | Refills: 11 | Status: SHIPPED | OUTPATIENT
Start: 2025-06-19 | End: 2026-06-19

## 2025-06-19 NOTE — LETTER
June 28, 2025        Jessika Carbone  [address] [facility] [place] [address]  Phone: [phone]  Fax: [phone]             [facility] [address]  [address]  [facility] [address]  Phone: [phone]   Patient: Tej Purdy   MR Number: [id number]   YOB: 1955   Date of Visit: 6/19/2025       Dear Dr. Jessika Carbone    Thank you for referring Tej Purdy to me for evaluation. Attached you will find relevant portions of my assessment and plan of care.    If you have questions, please do not hesitate to call me. I look forward to following Tej Purdy along with you.    Sincerely,    Thais Maxwell MD    Enclosure    If you would like to receive this communication electronically, please contact [email or web address] or [phone] to request [employer] access.    [employer] is a tool which provides read-only access to select patient information with whom you have a relationship. Its easy to use and provides real time access to review your patients record including encounter summaries, notes, results, and demographic information.    If you feel you have received this communication in error or would no longer like to receive these types of communications, please e-mail [email or web address]

## 2025-06-19 NOTE — PROGRESS NOTES
Transplant Hepatology  Liver Transplant Recipient Follow-up    Transplant Date: 1/6/2022  UNOS Native Liver Dx: Primary Liver Malignancy: Hepatoma (HCC) (final path revealed cholangiocarcinoma) and Cirrhosis    Tej is here for follow up of his liver transplant.    ORGAN: LIVER  Whole or Partial: whole liver  Donor Type: donation after brain death  CDC High Risk: no  Donor CMV Status: Positive  Donor HCV Status: Negative  Donor HBcAb: Negative  Donor HBV JAYLIN:   Donor HCV JAYLIN: Negative  Biliary Anastomosis: end to end  Arterial Anatomy: replaced left hepatic from left gastric  IVC reconstruction: end to end ivc  Portal vein status: patent    EXPLANT: 7 cm tumor cholangiocarcinoma, necrotic, no vascular invasion, cirrhosis, 1 lymph node negative    He has had the following complications since transplant: none.     Subjective:     Interval History: Tej is now 3 years and 5 months post liver transplant. Currently, he is doing well     --he had been falling and has had fractures (?sec to neuropathy).  --He has had several admissions for heart failure and underwent a TAVR on 2/1/24; this was replaced 2/18/25  --pacemaker placed 2/19/25  --He was also admitted for suicidal ideation. He is now improved on lexapro-new/wellbutrin(discontinued)/seroquel/diazepam    Allograft function 5/23/25: ALT 21, AST 28, ALKP 85, Tbil 0.8, creat 1.4, CSA 63, CA 19-9 <2.1 (1/27/25)  IS: csa, cellcept 500 mg bid  Immunoprophylaxis:  Aspirin: YES/NO(WHY) DOSE: 81 mg   Apixaban/Eliquis     Abdo US doppler 12/23/23: satisfactory doppler; mild intrahepatic duct dilation left lobe  Hx cholangiocarcinoma: saw oncology: adjuvant chemo deferred   Ct abdo w contrast 1/8/25: no obvious recurrence of cholangio;  Ct chest abdo pelvis 10/8/24: no obvious recurrence of cholangioca    Health maintenance  --colonoscopy-repeat 2023-now overdue  --bone density 3/14/23: Osteopenia; Daily calcium intake 2386-7388 mg, dietary sources preferred; Vitamin D  6779-5173 IU daily; Weight bearing exercise and fall precautions; Repeat BMD in 2 years; repeat bone density 3/25  Dermatology: 5/31/24- no skin cancer-return 5/25    Review of Systems   HENT: Negative.     Eyes: Negative.    Respiratory: Negative.     Cardiovascular: Negative.    Genitourinary: Negative.    Musculoskeletal: Negative.    Skin: Negative.    Psychiatric/Behavioral: Negative.         Objective:     Vitals:    06/19/25 1457   BP: 133/75   Pulse: 87   Resp: 16   Temp: 97.3 °F (36.3 °C)       Physical Exam  Constitutional:       Appearance: Normal appearance.   Eyes:      General: No scleral icterus.  Cardiovascular:      Rate and Rhythm: Normal rate and regular rhythm.   Pulmonary:      Effort: Pulmonary effort is normal.   Abdominal:      General: Abdomen is flat. There is no distension.      Palpations: Abdomen is soft. There is no mass.      Tenderness: There is no abdominal tenderness.   Musculoskeletal:         General: No swelling.   Skin:     General: Skin is warm and dry.   Neurological:      Mental Status: He is alert and oriented to person, place, and time.   Psychiatric:         Mood and Affect: Mood normal.            Lab Results   Component Value Date    BILITOT 0.8 05/23/2025    BILITOT 0.9 02/12/2025    AST 28 05/23/2025    AST 17 02/12/2025    ALT 21 05/23/2025    ALT 10 02/12/2025    ALKPHOS 85 05/23/2025    ALKPHOS 80 02/12/2025    CREATININE 1.4 05/23/2025    ALBUMIN 3.8 05/23/2025    ALBUMIN 3.6 02/12/2025     Lab Results   Component Value Date    WBC 8.83 05/23/2025    HGB 15.2 05/23/2025    HGB 13.9 (L) 03/18/2025    HCT 49.4 05/23/2025    HCT 45.4 03/18/2025    HCT 39 08/25/2024     (L) 05/23/2025    PLT 82 (L) 03/18/2025     Lab Results   Component Value Date    TACROLIMUS <2.0 (L) 01/27/2025    CYCLOSPORINE 63 (L) 05/23/2025       Assessment/Plan:   The patient is a 69 year old male who is now 3 years and 5 months post liver transplant. He has good allograft function. Has  undergone second TAVR. Current recommendations:  1. S/p liver transplant and control of IS: good allograft function, csa ; contineu cellcept 500 mg bid  2. Prophylaxis:  Aspirin: YES/NO(WHY) DOSE: 81 mg   Eliquis, plavix-f/u with cardiology asap- may need to d/c since is falling  3. Cholangiocarcinoma: chemotx deferred; continue surveillance imaging and cea/ca19-9 7/25 and every 6 months w   4. Ascites: resolved- continue off lasix  5.  Health maintenance: the patient should see a dermatologist annually to screen for skin cancer (has a hx of skin cancer) (return visit 5/25- to be seen now), perform regular colonoscopies (due 2023-now overdue)  6. R/O osteoporosis. I am recommending a repeat bone density to r/o osteoporosis -due now; start ca 1000 mg daily with vit D  7. Heart failure: F/u with cardiology   8. Elevated BMI: improved-now 26  9. F/u psychiatry  10. F/u cardiology    Return 6 months  A total of 60 minutes was spent reviewing the patient's chart, examining the patient, reviewing labs and imaging and coordinating care with the patient's care team.        Thais Maxwell MD           [facility] Patient Status  Functional Status: 70% - Cares for self: unable to carry on normal activity or active work  Physical Capacity: No Limitations    New diabetes onset between last follow-up to the current follow-up: No  Did patient have any acute rejection episodes during the follow-up period: No  Post transplant malignancy: No

## 2025-06-19 NOTE — PROGRESS NOTES
PCP - Kole Sharpe MD  Referring Physician:     Subjective:   Patient ID:  Tej Purdy is a 69 y.o. male with HTN, HLD, T2DM, CAD s/p PCI to OM1 2023, persistent AF, severe AS (BAV 24, TAVR 25), Medtronic dcPPM post TAVR (new LBBB, EPS with prolonged HV interval; LBBAP lead), HFrecEF (reduced in the setting of obstructive CAD and sevAS, recovered after revasc and TAVR) who is here for general follow up of cardiac problems. He was last seen by us in clinic with symptomatic severe AS.    He complains that he has had fatigue and poor appetite with some weight loss. He is trying to get out and exercise a little more. He said he feels way better than he did before he had the valve done. He is breathing better, has better exercise tolerance. No chest pain or dyspnea. He has bilateral hip and shin pain (anterior shin cramping, difficult to describe the hip pain) when he walks that is restricting him after 5-10 minutes of walking. Even taking the trash out he says will cause it to happen and it is limiting his activities. He says if he sits to rest it will resolve. He can walk in the pool longer than he can out of the pool. He also has neuropathy as well he says (likely diabetic). He does have hip joint issues, too (pain with twisting, etc). Neither leg is worse. He is working out 1-2 times per week on his own and is walking close to a mile in the pool.    He did have a CV ultrasound doppler of the right leg in 2024, which showed a  of his R SFA with distal reconstitution via collateralization.    Cardiac rehab reached out to him and he hasn't set it up. His main concern is the pain.    History:     Social History     Tobacco Use    Smoking status: Former     Current packs/day: 0.00     Types: Cigarettes     Quit date: 1980     Years since quittin.4    Smokeless tobacco: Former   Substance Use Topics    Alcohol use: Not Currently     Family History   Problem Relation  Name Age of Onset    Coronary artery disease Father      Colon cancer Neg Hx      Esophageal cancer Neg Hx         Meds:     Review of patient's allergies indicates:   Allergen Reactions    Bee pollens Swelling     BEE STINGS swells body     Current Medications[1]    Constitution: Negative for fever, chills, weight loss or gain.   HENT: Negative for sore throat, rhinorrhea, or headache.  Eyes: Negative for blurred or double vision.   Cardiovascular: See above  Pulmonary: Negative for SOB   Gastrointestinal: Negative for abdominal pain, nausea, vomiting, or diarrhea.   : Negative for dysuria.   Neurological: Negative for focal weakness or sensory changes.    Objective:   /80 (Patient Position: Sitting)   Pulse 91   Ht 6' (1.829 m)   Wt 87.1 kg (192 lb 0.3 oz)   SpO2 96%   BMI 26.04 kg/m²   Constitutional: No distress, appropriately conversant  HEENT: Sclera anicteric, PERRLA  Neck: No JVD, no masses, good movement  CV: RRR, S1 and S2 normal, no additional heart sounds or murmurs.  Pulses by palpation:  L DP 2+  L PT 2+  R DP 1+  R PT 1+  Pulm: Lung sounds clear bilaterally without respiratory distress   GI: Abdomen soft, non-tender, good bowel sounds  Extremities: Extremities grossly normal, warm, well perfused, no edema  Skin: No ecchymosis, erythema, or ulcers  Psych: AOx3, appropriate affect    Most Recent Lab Data:     CBC:   Lab Results   Component Value Date    WBC 8.83 05/23/2025    HGB 15.2 05/23/2025    HCT 49.4 05/23/2025     (L) 05/23/2025    MCV 88 05/23/2025    RDW 16.9 (H) 05/23/2025     BMP:   Lab Results   Component Value Date     05/23/2025    K 4.6 05/23/2025     05/23/2025    CO2 25 05/23/2025    BUN 28 (H) 05/23/2025    CREATININE 1.4 05/23/2025    GLU 74 05/23/2025    CALCIUM 9.1 05/23/2025    MG 2.1 01/11/2025    PHOS 4.0 05/05/2025     LFTS;   Lab Results   Component Value Date    PROT 7.0 05/23/2025    ALBUMIN 3.8 05/23/2025    BILITOT 0.8 05/23/2025    AST 28  05/23/2025    ALKPHOS 85 05/23/2025    ALT 21 05/23/2025    GGT 80 (H) 02/28/2024     COAGS:   Lab Results   Component Value Date    INR 1.4 (H) 01/14/2025     FLP:   Lab Results   Component Value Date    CHOL 108 (L) 04/02/2025    HDL 26 (L) 04/02/2025    LDLCALC 47.6 (L) 04/02/2025    TRIG 172 (H) 04/02/2025    CHOLHDL 24.1 04/02/2025     CARDIAC:   Lab Results   Component Value Date    TROPONINI 0.022 01/27/2024     (H) 02/18/2025         Cardiovascular Imaging:     Echo 3/18/25:    Left Ventricle: The left ventricle is normal in size. Normal wall thickness. There is normal systolic function with a visually estimated ejection fraction of 60 - 65%. There is normal diastolic function.    Right Ventricle: The right ventricle is normal in size. Wall thickness is normal. Systolic function is normal.    Aortic Valve: There is a transcatheter valve replacement in the aortic position. It is reported to be a 34 mm Evolut and Medtronic valve. There is mild stenosis. Aortic valve area by VTI is 2.4 cm2. Aortic valve peak velocity is 2.6 m/s. Mean gradient is 13 mmHg. The dimensionless index is 0.50.    Pulmonary Artery: No pulmonary hypertension. The estimated pulmonary artery systolic pressure is 30 mmHg.    IVC/SVC: Normal venous pressure at 3 mmHg.    Frequent extrasystoles present throughout the study    CPX 4/2/25:    Severe functional impairment associated with a normal breathing reserve, normal oxygen stauration, poor effort. These findings are indicative of functional impairment secondary to poor effort.    The ECG portion of this study is negative for myocardial ischemia.    The patient's exercise capacity was severely impaired.    There were no arrhythmias during stress.    The test was stopped because the patient experienced atypical leg pain.    There was no ST segment deviation noted during stress.    Assessment & Plan:     1. Severe aortic stenosis    2. S/P TAVR (transcatheter aortic valve replacement)     3. Heart failure with recovered ejection fraction (HFrecEF)    4. Primary hypertension    5. Coronary artery disease involving native coronary artery of native heart without angina pectoris    6. Mixed hyperlipidemia    7. Presence of drug-eluting stent in left circumflex coronary artery    8. Persistent atrial fibrillation    9. Chronic systolic heart failure    10. Claudication    11. Peripheral arterial disease        #Severe AS  #S/p TAVR  S/p BAV 2/1/24  S/p TAVR 2/18/25  S/p Medtronic dcPPM post TAVR (LBBAP)  No antiplatelet agent, on Eliquis  Patient still with poor functional status, would really benefit from cardiac rehab but is restricted by claudication    #HFrecEF  #HTN  Euvolemic  Continue GDMT: Coreg 12.5, Entresto 49-51, Jardiance  Continue lasix 20mg prn- he isn't really taking  No changes    #CAD s/p PCI to OM1  #HLD  LDL 48, at goal < 70  Continue HI statin  Should be on P2Y12 given prior coronary stenting and on Eliquis  Start Plavix 75 qd    #Persistent AF  Continue Eliquis    #PAD  #Claudication  CV US Bilateral LE arterial  Referral to interventional cardiology for significantly symptomatic PAD evaluation  He said he is exercising several hours per week (water aerobics) and would like to exercise more but is limited by his claudication. We discussed cardiac rehab.  Sending to PAD rehab/monitored exercise program in [place]      Discussed mediterranean diet  Discussed exercise therapy based on 150-min per week AHA recommendations for moderate intensity exercise/75 min for high-intensity exercise    3 month follow up  This plan was formulated after discussion with Dr. Stewart Connor Gillies, , PGY-V  [facility]         [1]   Current Outpatient Medications:     apixaban (ELIQUIS) 5 mg Tab, Take 1 tablet (5 mg total) by mouth 2 (two) times daily., Disp: 180 tablet, Rfl: 1    atorvastatin (LIPITOR) 80 MG tablet, Take 1 tablet (80 mg total) by mouth once daily.,  Disp: 90 tablet, Rfl: 3    blood sugar diagnostic Strp, To check BG 2 times daily, to use with insurance preferred meter (patient has a [employer] self-monitoring glucose meter), Disp: 100 strip, Rfl: 11    blood-glucose meter (ACCU-CHEK GUIDE GLUCOSE METER) Misc, Use as directed, Disp: 1 each, Rfl: 0    blood-glucose meter kit, To check BG 2 times daily, to use with insurance preferred meter, Disp: 1 each, Rfl: 0    carvediloL (COREG) 12.5 MG tablet, Take 1 tablet (12.5 mg total) by mouth 2 (two) times daily with meals., Disp: 180 tablet, Rfl: 3    cycloSPORINE modified, NEORAL, (NEORAL) 25 MG capsule, Take 2 capsules (50 mg total) by mouth every morning AND 1 capsule (25 mg total) every evening., Disp: 90 capsule, Rfl: 11    diazePAM (VALIUM) 5 MG tablet, Take 1 tablet (5 mg total) by mouth as needed for Anxiety. Take 1 tab 30 min prior to MRI, take 2nd as needed, Disp: 2 tablet, Rfl: 0    empagliflozin (JARDIANCE) 10 mg tablet, Take 1 tablet (10 mg total) by mouth once daily., Disp: 90 tablet, Rfl: 3    EScitalopram oxalate (LEXAPRO) 5 MG Tab, Take 1 tablet (5 mg total) by mouth once daily., Disp: 30 tablet, Rfl: 11    ferrous sulfate (IRON) 325 mg (65 mg iron) Tab tablet, Take 1 tablet (325 mg total) by mouth once daily., Disp: 30 tablet, Rfl: 2    furosemide (LASIX) 20 MG tablet, Take 1 tablet (20 mg total) by mouth once daily., Disp: 90 tablet, Rfl: 3    HYDROcodone-acetaminophen (NORCO) 5-325 mg per tablet, Take 1 tablet by mouth every 8 (eight) hours as needed for Pain., Disp: 12 tablet, Rfl: 0    insulin glargine U-100, Lantus, (LANTUS SOLOSTAR U-100 INSULIN) 100 unit/mL (3 mL) InPn pen, Inject 22 Units into the skin every evening., Disp: 15 mL, Rfl: 11    insulin lispro (HUMALOG KWIKPEN INSULIN) 100 unit/mL pen, Inject 6 Units into the skin 3 (three) times daily with meals. Plus sliding scale, MDD: 48 units, Disp: 15 mL, Rfl: 3    lancets Misc, To check BG 2 times daily, to use with insurance preferred  "meter, Disp: 100 each, Rfl: 11    levETIRAcetam (KEPPRA) 500 MG Tab, Take 1 tablet (500 mg total) by mouth 2 (two) times daily., Disp: 60 tablet, Rfl: 11    magnesium oxide (MAG-OX) 400 mg (241.3 mg magnesium) tablet, Take 1 tablet (400 mg total) by mouth 2 (two) times daily., Disp: 60 tablet, Rfl: 11    multivitamin capsule, Take 1 capsule by mouth once daily., Disp: , Rfl:     mycophenolate (CELLCEPT) 250 mg Cap, Take 2 capsules (500 mg total) by mouth 2 (two) times daily., Disp: 120 capsule, Rfl: 11    omega-3 fatty acids/fish oil (FISH OIL-OMEGA-3 FATTY ACIDS) 300-1,000 mg capsule, Take 1 capsule by mouth once daily. , Disp: , Rfl:     pantoprazole (PROTONIX) 40 MG tablet, Take 1 tablet (40 mg total) by mouth once daily., Disp: 90 tablet, Rfl: 1    pen needle, diabetic (BD ULTRA-FINE GÉNESIS PEN NEEDLE) 32 gauge x 5/32" Ndle, Use to inject insulin 4 times daily, Disp: 200 each, Rfl: 11    pregabalin (LYRICA) 75 MG capsule, Take 1 capsule (75 mg total) by mouth 2 (two) times daily., Disp: 60 capsule, Rfl: 0    QUEtiapine (SEROQUEL) 100 MG Tab, Take 1 tablet (100 mg total) by mouth every evening., Disp: 90 tablet, Rfl: 0    sacubitriL-valsartan (ENTRESTO) 49-51 mg per tablet, Take 1 tablet by mouth 2 (two) times daily., Disp: 180 tablet, Rfl: 3    Current Facility-Administered Medications:     sodium chloride 0.9% flush 10 mL, 10 mL, Intravenous, PRN, Shilo Carrasco MD    "

## 2025-06-19 NOTE — Clinical Note
1. Ct chest abdo pelvis, triphasic 7/25 and every 6 months w CA 19-9 2. Bone density now 3. Consider colonoscopy 4. Dermatology now 5. F/u neurology re seizure- determine if still needs keppra 6. F/u psyhciatry 7. F/u cardiology 8. Return 6 months

## 2025-06-19 NOTE — PATIENT INSTRUCTIONS
Ct chest abdo pelvis, triphasic 7/25 and every 6 months w CA 19-9  Bone density now  Consider colonoscopy  Dermatology now  F/u neurology re seizure- determine if still needs keppra  F/u psyhciatry  F/u cardiology  Return 6 months

## 2025-06-20 DIAGNOSIS — Z12.83 SCREENING FOR SKIN CANCER: Primary | ICD-10-CM

## 2025-06-29 RX ORDER — SACUBITRIL AND VALSARTAN 49; 51 MG/1; MG/1
1 TABLET, FILM COATED ORAL 2 TIMES DAILY
Qty: 180 TABLET | Refills: 3 | Status: SHIPPED | OUTPATIENT
Start: 2025-06-29

## 2025-06-29 NOTE — TELEPHONE ENCOUNTER
Refill Decision Note   Tej Gurwinder  is requesting a refill authorization.  Brief Assessment and Rationale for Refill:  Approve     Medication Therapy Plan: LOV 6/19/25 Gillies, Connor M, DO      Comments:     Note composed:8:41 AM 06/29/2025

## 2025-06-30 DIAGNOSIS — Z94.4 S/P LIVER TRANSPLANT: ICD-10-CM

## 2025-06-30 DIAGNOSIS — C22.1 CHOLANGIOCARCINOMA: Primary | ICD-10-CM

## 2025-07-01 NOTE — ANESTHESIA POSTPROCEDURE EVALUATION
Anesthesia Post Evaluation    Patient: Tej Purdy    Procedure(s) Performed: * No procedures listed *    Final Anesthesia Type: general      Patient location during evaluation: PACU  Patient participation: Yes- Able to Participate  Level of consciousness: awake and alert and oriented  Post-procedure vital signs: reviewed and stable  Pain management: adequate  Airway patency: patent  LUISA mitigation strategies: Intraoperative administration of CPAP, nasopharyngeal airway, or oral appliance during sedation, Multimodal analgesia, Extubation while patient is awake, Verification of full reversal of neuromuscular block and Extubation and recovery carried out in lateral, semiupright, or other nonsupine position  PONV status at discharge: No PONV  Anesthetic complications: no      Cardiovascular status: hemodynamically stable  Respiratory status: unassisted  Hydration status: euvolemic  Follow-up not needed.          Vitals Value Taken Time   /77 12/30/20 1451   Temp 36.2 °C (97.2 °F) 12/30/20 1451   Pulse 77 12/30/20 1514   Resp 5 12/30/20 1506   SpO2 92 % 12/30/20 1451         No case tracking events are documented in the log.      Pain/Cordelia Score: Pain Rating Prior to Med Admin: 5 (12/30/2020  3:06 PM)  Pain Rating Post Med Admin: 5 (12/30/2020  6:00 AM)  Cordelia Score: 10 (12/30/2020  2:40 PM)        
(4) no limitation

## 2025-07-21 ENCOUNTER — LAB VISIT (OUTPATIENT)
Dept: LAB | Facility: HOSPITAL | Age: 70
End: 2025-07-21
Attending: INTERNAL MEDICINE
Payer: MEDICARE

## 2025-07-21 ENCOUNTER — RESULTS FOLLOW-UP (OUTPATIENT)
Dept: TRANSPLANT | Facility: CLINIC | Age: 70
End: 2025-07-21
Payer: MEDICARE

## 2025-07-21 ENCOUNTER — PATIENT MESSAGE (OUTPATIENT)
Dept: DERMATOLOGY | Facility: CLINIC | Age: 70
End: 2025-07-21
Payer: MEDICARE

## 2025-07-21 DIAGNOSIS — C22.1 CHOLANGIOCARCINOMA: ICD-10-CM

## 2025-07-21 DIAGNOSIS — C22.1 CHOLANGIOCARCINOMA: Primary | ICD-10-CM

## 2025-07-21 DIAGNOSIS — R97.8 OTHER ABNORMAL TUMOR MARKERS: ICD-10-CM

## 2025-07-21 DIAGNOSIS — Z94.4 S/P LIVER TRANSPLANT: ICD-10-CM

## 2025-07-21 DIAGNOSIS — R97.0 ELEVATED CARCINOEMBRYONIC ANTIGEN (CEA): ICD-10-CM

## 2025-07-21 LAB
ABSOLUTE EOSINOPHIL (OHS): 0.26 K/UL
ABSOLUTE MONOCYTE (OHS): 0.9 K/UL (ref 0.3–1)
ABSOLUTE NEUTROPHIL COUNT (OHS): 5.27 K/UL (ref 1.8–7.7)
ALBUMIN SERPL BCP-MCNC: 3.9 G/DL (ref 3.5–5.2)
ALP SERPL-CCNC: 86 UNIT/L (ref 40–150)
ALT SERPL W/O P-5'-P-CCNC: 22 UNIT/L (ref 10–44)
ANION GAP (OHS): 9 MMOL/L (ref 8–16)
AST SERPL-CCNC: 28 UNIT/L (ref 11–45)
BASOPHILS # BLD AUTO: 0.06 K/UL
BASOPHILS NFR BLD AUTO: 0.8 %
BILIRUB SERPL-MCNC: 0.7 MG/DL (ref 0.1–1)
BUN SERPL-MCNC: 51 MG/DL (ref 8–23)
CALCIUM SERPL-MCNC: 9.1 MG/DL (ref 8.7–10.5)
CANCER AG19-9 SERPL-ACNC: <2.1 U/ML
CARCINOEMBRYONIC ANTIGEN (OHS): <1.7 NG/ML
CHLORIDE SERPL-SCNC: 106 MMOL/L (ref 95–110)
CO2 SERPL-SCNC: 25 MMOL/L (ref 23–29)
CREAT SERPL-MCNC: 1.6 MG/DL (ref 0.5–1.4)
CYCLOSPORINE BLD LC/MS/MS-MCNC: 56 NG/ML (ref 100–400)
ERYTHROCYTE [DISTWIDTH] IN BLOOD BY AUTOMATED COUNT: 15.4 % (ref 11.5–14.5)
GFR SERPLBLD CREATININE-BSD FMLA CKD-EPI: 46 ML/MIN/1.73/M2
GLUCOSE SERPL-MCNC: 49 MG/DL (ref 70–110)
HCT VFR BLD AUTO: 44.5 % (ref 40–54)
HGB BLD-MCNC: 14.3 GM/DL (ref 14–18)
IMM GRANULOCYTES # BLD AUTO: 0.04 K/UL (ref 0–0.04)
IMM GRANULOCYTES NFR BLD AUTO: 0.5 % (ref 0–0.5)
LYMPHOCYTES # BLD AUTO: 1.42 K/UL (ref 1–4.8)
MCH RBC QN AUTO: 30 PG (ref 27–31)
MCHC RBC AUTO-ENTMCNC: 32.1 G/DL (ref 32–36)
MCV RBC AUTO: 93 FL (ref 82–98)
NUCLEATED RBC (/100WBC) (OHS): 0 /100 WBC
PLATELET # BLD AUTO: 91 K/UL (ref 150–450)
PMV BLD AUTO: 11.8 FL (ref 9.2–12.9)
POTASSIUM SERPL-SCNC: 4.8 MMOL/L (ref 3.5–5.1)
PROT SERPL-MCNC: 7.1 GM/DL (ref 6–8.4)
RBC # BLD AUTO: 4.77 M/UL (ref 4.6–6.2)
RELATIVE EOSINOPHIL (OHS): 3.3 %
RELATIVE LYMPHOCYTE (OHS): 17.9 % (ref 18–48)
RELATIVE MONOCYTE (OHS): 11.3 % (ref 4–15)
RELATIVE NEUTROPHIL (OHS): 66.2 % (ref 38–73)
SODIUM SERPL-SCNC: 140 MMOL/L (ref 136–145)
WBC # BLD AUTO: 7.95 K/UL (ref 3.9–12.7)

## 2025-07-21 PROCEDURE — 85025 COMPLETE CBC W/AUTO DIFF WBC: CPT

## 2025-07-21 PROCEDURE — 86301 IMMUNOASSAY TUMOR CA 19-9: CPT

## 2025-07-21 PROCEDURE — 80158 DRUG ASSAY CYCLOSPORINE: CPT

## 2025-07-21 PROCEDURE — 36415 COLL VENOUS BLD VENIPUNCTURE: CPT | Mod: PN

## 2025-07-21 PROCEDURE — 84450 TRANSFERASE (AST) (SGOT): CPT

## 2025-07-21 PROCEDURE — 82378 CARCINOEMBRYONIC ANTIGEN: CPT

## 2025-07-22 NOTE — TELEPHONE ENCOUNTER
Sent patient message via portal to let him know labs are stable.  No medication changes, next labs due on 10/20/25    ----- Message from Thais Maxwell MD sent at 7/21/2025  7:13 PM CDT -----  The Labs are stable - please let patient know.  ----- Message -----  From: Lab, Background User  Sent: 7/21/2025  11:47 AM CDT  To: Thais Maxwell MD

## 2025-07-23 DIAGNOSIS — C22.1 CHOLANGIOCARCINOMA: Primary | ICD-10-CM

## 2025-07-25 ENCOUNTER — HOSPITAL ENCOUNTER (OUTPATIENT)
Dept: RADIOLOGY | Facility: HOSPITAL | Age: 70
Discharge: HOME | End: 2025-07-25
Attending: INTERNAL MEDICINE
Payer: MEDICARE

## 2025-07-25 DIAGNOSIS — C22.1 CHOLANGIOCARCINOMA: ICD-10-CM

## 2025-07-25 PROCEDURE — 74160 CT ABDOMEN W/CONTRAST: CPT | Mod: 26,,, | Performed by: STUDENT IN AN ORGANIZED HEALTH CARE EDUCATION/TRAINING PROGRAM

## 2025-07-25 PROCEDURE — 71250 CT THORAX DX C-: CPT | Mod: 26,,, | Performed by: RADIOLOGY

## 2025-07-25 PROCEDURE — 25500020 PHARM REV CODE 255: Performed by: INTERNAL MEDICINE

## 2025-07-25 PROCEDURE — 71250 CT THORAX DX C-: CPT | Mod: TC

## 2025-07-25 PROCEDURE — 74160 CT ABDOMEN W/CONTRAST: CPT | Mod: TC

## 2025-07-25 RX ADMIN — IOHEXOL 100 ML: 350 INJECTION, SOLUTION INTRAVENOUS at 03:07

## 2025-07-28 ENCOUNTER — RESULTS FOLLOW-UP (OUTPATIENT)
Dept: TRANSPLANT | Facility: CLINIC | Age: 70
End: 2025-07-28
Payer: MEDICARE

## 2025-07-29 NOTE — TELEPHONE ENCOUNTER
Sent message to patient to let him know.    ----- Message from Thais Maxwell MD sent at 7/28/2025  9:38 PM CDT -----  CT stable - please let patient know.  ----- Message -----  From: Jahaira, Rad Results In  Sent: 7/28/2025   3:23 PM CDT  To: Thais Maxwell MD

## 2025-07-29 NOTE — TELEPHONE ENCOUNTER
Sent a message to let patient know    ----- Message from Thais Maxwell MD sent at 7/28/2025  9:40 PM CDT -----  CT stable - please let patient know.  ----- Message -----  From: Jahaira Rad Results In  Sent: 7/28/2025   2:03 PM CDT  To: Thais Mxawell MD

## 2025-08-21 ENCOUNTER — CLINICAL SUPPORT (OUTPATIENT)
Dept: CARDIOLOGY | Facility: HOSPITAL | Age: 70
End: 2025-08-21
Payer: MEDICARE

## 2025-08-21 ENCOUNTER — CLINICAL SUPPORT (OUTPATIENT)
Dept: CARDIOLOGY | Facility: HOSPITAL | Age: 70
End: 2025-08-21
Attending: INTERNAL MEDICINE
Payer: MEDICARE

## 2025-08-21 DIAGNOSIS — Z95.0 PRESENCE OF CARDIAC PACEMAKER: ICD-10-CM

## 2025-08-21 DIAGNOSIS — I45.5 OTHER SPECIFIED HEART BLOCK: ICD-10-CM

## 2025-08-21 PROCEDURE — 93296 REM INTERROG EVL PM/IDS: CPT | Performed by: INTERNAL MEDICINE

## 2025-08-21 PROCEDURE — 93294 REM INTERROG EVL PM/LDLS PM: CPT | Mod: ,,, | Performed by: INTERNAL MEDICINE

## 2025-08-25 LAB
OHS CV AF BURDEN PERCENT: < 1
OHS CV DC REMOTE DEVICE TYPE: NORMAL
OHS CV RV PACING PERCENT: 0.05 %

## 2025-08-29 ENCOUNTER — PATIENT MESSAGE (OUTPATIENT)
Dept: ADMINISTRATIVE | Facility: HOSPITAL | Age: 70
End: 2025-08-29
Payer: MEDICARE

## (undated) DEVICE — CATH TRUE FLOW 20MM 3.5X110CM

## (undated) DEVICE — GUIDEWIRE STF .035X260CM STR

## (undated) DEVICE — TUBING HF INSUFFLATION W/ FLTR

## (undated) DEVICE — GUIDEWIRE SUPRA CORE 035 190CM

## (undated) DEVICE — SNAP CAP 18 DOME COVERS

## (undated) DEVICE — TROCAR ENDOPATH XCEL 5MM 7.5CM

## (undated) DEVICE — KIT CUSTOM MANIFOLD

## (undated) DEVICE — CATH MPA2 INFINITI 4FR 100CM

## (undated) DEVICE — SEE MEDLINE ITEM 156901

## (undated) DEVICE — TROCAR ENDOPATH XCEL 5X75MM

## (undated) DEVICE — SUT SILK 2-0 STRANDS 30IN

## (undated) DEVICE — SUT 2-0 12-18IN SILK

## (undated) DEVICE — CONNECTOR TUBING STR 5 IN 1

## (undated) DEVICE — CATH EMERGE MR 20 X 2.00

## (undated) DEVICE — CABLE PACING ALLGTR CLIP 12FT

## (undated) DEVICE — DRESSING AQUACEL SACRAL 9 X 9

## (undated) DEVICE — NDL MONOPTY BIOPSY 14GX10CM

## (undated) DEVICE — HEMOSTAT VASC BAND REG 24CM

## (undated) DEVICE — INFLATOR ENCORE 26 BLLN INFL

## (undated) DEVICE — SUT PDS BV 6-0

## (undated) DEVICE — CONTAINER SPECIMEN OR STER 4OZ

## (undated) DEVICE — OMNIPAQUE 350MG 150ML VIAL

## (undated) DEVICE — DRAPE ANGIO BRACH 38X44IN

## (undated) DEVICE — CATH URETHRAL RED RUBBER 18FR

## (undated) DEVICE — SUT PROLENE 4-0 SH BLU 36IN

## (undated) DEVICE — CATH IMPULSE FL4 5FR 100CM

## (undated) DEVICE — CATH IMPULSE FR4 5FR 125CM

## (undated) DEVICE — GUIDEWIRE EMERALD 150CM PTFE

## (undated) DEVICE — GUIDEWIRE STF .035X180CM ANG

## (undated) DEVICE — DRESSING ADH ISLAND 3.6 X 14

## (undated) DEVICE — SUT CTD VICRYL 4-0 PS-4 UND

## (undated) DEVICE — TUBING PRSS MON M/M LL 72IN

## (undated) DEVICE — Device

## (undated) DEVICE — OMNIPAQUE CONTRAST 350MG/100ML

## (undated) DEVICE — SHEATH INTRODUCER 8FR 11CM

## (undated) DEVICE — KIT MNTR POLE MT DUL 12&48 MAC

## (undated) DEVICE — HANDSET ARGON PLUS

## (undated) DEVICE — KIT MICROINTRO 4F .018X40X7CM

## (undated) DEVICE — KIT SAHARA DRAPE DRAW/LIFT

## (undated) DEVICE — CATH PIG IMPULSE 6FR 125CM

## (undated) DEVICE — TUBING NEPTUNE 2 SMOKE 10IN

## (undated) DEVICE — EVACUATOR WOUND BULB 100CC

## (undated) DEVICE — PAD DEFIB CADENCE ADULT R2

## (undated) DEVICE — PROTECTION STATION PLUS

## (undated) DEVICE — WIPE ESENTA BARR STNG FREE 3ML

## (undated) DEVICE — SPIKE CONTRAST CONTROLLER

## (undated) DEVICE — KIT GLIDESHEATH SLEND 6FR 10CM

## (undated) DEVICE — HEMOSTAT SURGICEL NU-KNIT 6X9

## (undated) DEVICE — CATH TEMP PACER 5.0FR

## (undated) DEVICE — TIP YANKAUERS BULB NO VENT

## (undated) DEVICE — SOL NS 1000CC

## (undated) DEVICE — SOL IRR NACL .9% 3000ML

## (undated) DEVICE — TRAY CATH LAB OMC

## (undated) DEVICE — SPIKE SHORT LG BORE 1-WAY 2IN

## (undated) DEVICE — SUT SILK 3-0 STRANDS 30IN

## (undated) DEVICE — SEE MEDLINE ITEM 156894

## (undated) DEVICE — ELECTRODE REM PLYHSV RETURN 9

## (undated) DEVICE — DRAPE ABDOMINAL TIBURON 14X11

## (undated) DEVICE — SUT PROLENE 5-0 36IN C-1

## (undated) DEVICE — GUIDEWIRE SAFARI2 XS CRV 275CM

## (undated) DEVICE — SET DECANTER MEDICHOICE

## (undated) DEVICE — TUBING HPCIL ROT M/F ADPT 10IN

## (undated) DEVICE — ELECTRODE PAD DEFIB STERILE

## (undated) DEVICE — CLIP SPRING 6MM

## (undated) DEVICE — DRAPE CORETEMP FLD WRM 56X62IN

## (undated) DEVICE — SUT 4-0 12-30IN SILK

## (undated) DEVICE — SUT ETHILON 3-0 PS2 18 BLK

## (undated) DEVICE — SHEATH DRYSEAL 18FR 33CM

## (undated) DEVICE — GUIDE LAUNCHER 6FR EBU 3.0

## (undated) DEVICE — SHEATH PINNACLE 8FR

## (undated) DEVICE — COVER PROBE US 5.5X58L NON LTX

## (undated) DEVICE — FLEXSHEATH DRYSEAL 12FR 33CM

## (undated) DEVICE — DRAIN CHANNEL ROUND 19FR

## (undated) DEVICE — OMNIPAQUE 350 200ML

## (undated) DEVICE — GUIDEWIRE X SPORT .014IN 190CM

## (undated) DEVICE — SUT PROLENE 3-0 SH DA 36 BL

## (undated) DEVICE — CATH JACKY RADIAL 5FR 100CM

## (undated) DEVICE — KIT ANTIFOG W/SPONG & FLUID

## (undated) DEVICE — STOPCOCK 3-WAY

## (undated) DEVICE — SET EXTENSION STERILE 30IN

## (undated) DEVICE — ADHESIVE DERMABOND MINI HV

## (undated) DEVICE — SET IV ADMIN 15DROP 3 CARESITE

## (undated) DEVICE — SUT SILK 3-0 SH 18IN BLACK

## (undated) DEVICE — TRAY FOLEY 16FR INFECTION CONT

## (undated) DEVICE — SUT PERCLOSE PROSTYLE MEDIATE

## (undated) DEVICE — SUT PROLENE 6-0 BV-1 30IN

## (undated) DEVICE — CATH IMPULSE PIGTAIL 6F 110CM

## (undated) DEVICE — KIT PERCUTANEOUS SHEATH

## (undated) DEVICE — GUIDEWIRE NITINOL

## (undated) DEVICE — TRAY MINOR GEN SURG OMC

## (undated) DEVICE — SUT 4-0 12-18IN SILK BLACK

## (undated) DEVICE — CATH DXTERITY AL20 100CM 6FR

## (undated) DEVICE — BLADE 4 INCH EDGE UN-INS

## (undated) DEVICE — PAD K-THERMIA 24IN X 60IN

## (undated) DEVICE — SUT 3-0 12-18IN SILK

## (undated) DEVICE — GUIDE WIRE BMW 014 X190

## (undated) DEVICE — DRAPE PED LAP SURG 108X77IN

## (undated) DEVICE — TOWEL OR XRAY WHITE 17X26IN

## (undated) DEVICE — CATH IMPULSE D6F AL2 5PK

## (undated) DEVICE — SUT 0 VICRYL / UR6 (J603)

## (undated) DEVICE — TRANSDUCER ADULT DISP

## (undated) DEVICE — SUT 1 36IN PDS II VIO MONO

## (undated) DEVICE — SYR MARK 7 ARTERION 150ML

## (undated) DEVICE — SUT SILK 0 STRANDS 30IN BLK

## (undated) DEVICE — IRRIGATOR ENDOSCOPY DISP.

## (undated) DEVICE — GOWN SURGICAL X-LARGE

## (undated) DEVICE — DEVICE PERCLOSE SUT CLSR 6FR

## (undated) DEVICE — GUIDEWIRE CONFIDA BECKER CURVE

## (undated) DEVICE — STAPLER SKIN PROXIMATE WIDE

## (undated) DEVICE — BOOT AIR FLUID HEEL ADLT STD

## (undated) DEVICE — LINE 60IN PRESSURE MON.

## (undated) DEVICE — GUIDEWIRE EMERALD .035IN 260CM